# Patient Record
Sex: MALE | Race: BLACK OR AFRICAN AMERICAN | NOT HISPANIC OR LATINO | Employment: OTHER | URBAN - METROPOLITAN AREA
[De-identification: names, ages, dates, MRNs, and addresses within clinical notes are randomized per-mention and may not be internally consistent; named-entity substitution may affect disease eponyms.]

---

## 2017-01-04 ENCOUNTER — OFFICE VISIT (OUTPATIENT)
Dept: SURGERY | Facility: CLINIC | Age: 77
End: 2017-01-04
Payer: MEDICARE

## 2017-01-04 VITALS
HEART RATE: 71 BPM | HEIGHT: 75 IN | TEMPERATURE: 98 F | SYSTOLIC BLOOD PRESSURE: 116 MMHG | DIASTOLIC BLOOD PRESSURE: 62 MMHG | WEIGHT: 220 LBS | BODY MASS INDEX: 27.35 KG/M2

## 2017-01-04 DIAGNOSIS — K40.20 BILATERAL INGUINAL HERNIA WITHOUT OBSTRUCTION OR GANGRENE, RECURRENCE NOT SPECIFIED: Primary | ICD-10-CM

## 2017-01-04 DIAGNOSIS — I25.10 CORONARY ARTERY DISEASE, OCCLUSIVE: Chronic | ICD-10-CM

## 2017-01-04 DIAGNOSIS — E11.59 HYPERTENSION ASSOCIATED WITH DIABETES: Chronic | ICD-10-CM

## 2017-01-04 DIAGNOSIS — I15.2 HYPERTENSION ASSOCIATED WITH DIABETES: Chronic | ICD-10-CM

## 2017-01-04 DIAGNOSIS — E11.9 TYPE 2 DIABETES MELLITUS WITHOUT COMPLICATION, WITHOUT LONG-TERM CURRENT USE OF INSULIN: ICD-10-CM

## 2017-01-04 PROCEDURE — 1157F ADVNC CARE PLAN IN RCRD: CPT | Mod: S$GLB,,, | Performed by: SURGERY

## 2017-01-04 PROCEDURE — 1159F MED LIST DOCD IN RCRD: CPT | Mod: S$GLB,,, | Performed by: SURGERY

## 2017-01-04 PROCEDURE — 3078F DIAST BP <80 MM HG: CPT | Mod: S$GLB,,, | Performed by: SURGERY

## 2017-01-04 PROCEDURE — 99213 OFFICE O/P EST LOW 20 MIN: CPT | Mod: S$GLB,,, | Performed by: SURGERY

## 2017-01-04 PROCEDURE — 3074F SYST BP LT 130 MM HG: CPT | Mod: S$GLB,,, | Performed by: SURGERY

## 2017-01-04 PROCEDURE — 4010F ACE/ARB THERAPY RXD/TAKEN: CPT | Mod: S$GLB,,, | Performed by: SURGERY

## 2017-01-04 PROCEDURE — 1160F RVW MEDS BY RX/DR IN RCRD: CPT | Mod: S$GLB,,, | Performed by: SURGERY

## 2017-01-04 PROCEDURE — 1126F AMNT PAIN NOTED NONE PRSNT: CPT | Mod: S$GLB,,, | Performed by: SURGERY

## 2017-01-04 PROCEDURE — 3044F HG A1C LEVEL LT 7.0%: CPT | Mod: S$GLB,,, | Performed by: SURGERY

## 2017-01-04 PROCEDURE — 2022F DILAT RTA XM EVC RTNOPTHY: CPT | Mod: S$GLB,,, | Performed by: SURGERY

## 2017-01-04 PROCEDURE — 99999 PR PBB SHADOW E&M-EST. PATIENT-LVL III: CPT | Mod: PBBFAC,,, | Performed by: SURGERY

## 2017-01-04 RX ORDER — TIZANIDINE 4 MG/1
TABLET ORAL
COMMUNITY
Start: 2016-12-18 | End: 2017-02-23

## 2017-01-04 RX ORDER — OSELTAMIVIR PHOSPHATE 75 MG
CAPSULE ORAL
COMMUNITY
Start: 2016-12-18 | End: 2017-02-23

## 2017-01-04 RX ORDER — LIDOCAINE HYDROCHLORIDE 10 MG/ML
1 INJECTION, SOLUTION EPIDURAL; INFILTRATION; INTRACAUDAL; PERINEURAL ONCE
Status: CANCELLED | OUTPATIENT
Start: 2017-01-04 | End: 2017-01-04

## 2017-01-04 NOTE — PROGRESS NOTES
HPI:  The patient presents after us bilateral groins.  The patient states that he has pain of his bilateral hips, upper legs, back, and groin.  The pain is unchanged from previous visit.  US showed bilateral inguinal hernias.  Instructed that surgery most likely will not relive all pain but may help with groin pain.  No Nausea/vomiting.  No d/c.  No fevers    PHYSICAL EXAM:  Physical Exam    In NAD  Heart: RRR  Lungs: CTA B  Groin: No discreet hernia.    ASSESSMENT:  B Ingiunal Hernias     PLAN:    To OR for lap repair B inguinal hernia repairs with mesh  Consent obtained in clinic  Call with questions  Pt will call with date.    Will obtain cards clearance

## 2017-01-05 ENCOUNTER — TELEPHONE (OUTPATIENT)
Dept: SURGERY | Facility: CLINIC | Age: 77
End: 2017-01-05

## 2017-01-05 ENCOUNTER — TELEPHONE (OUTPATIENT)
Dept: CARDIOLOGY | Facility: CLINIC | Age: 77
End: 2017-01-05

## 2017-01-05 NOTE — TELEPHONE ENCOUNTER
Returned pt call in reference to setting up a surgery date with Dr. Ellis.  Pt states he would like to schedule surgery for 4/18/17.      Explained to pt that he would still need preop labs and he will need to get in touch with his cardiologist to schedule an appt with them ( pt states he has an appt sometime in March) Pt verbalizes understanding.

## 2017-01-05 NOTE — TELEPHONE ENCOUNTER
----- Message from Semaj Robertson sent at 1/5/2017 11:18 AM CST -----  Patient states that he needs to speak with nurse in ref to setting up surgery date//please call back at [phone]//thank you

## 2017-01-09 ENCOUNTER — PATIENT OUTREACH (OUTPATIENT)
Dept: ADMINISTRATIVE | Facility: HOSPITAL | Age: 77
End: 2017-01-09

## 2017-01-09 NOTE — PROGRESS NOTES
Most recent 2016 hemoglobin A1C routed to [employer] as attestation documentation for Diabetes Care-Blood Sugar Controlled measure. Measure has been met.

## 2017-01-10 ENCOUNTER — OFFICE VISIT (OUTPATIENT)
Dept: OPHTHALMOLOGY | Facility: CLINIC | Age: 77
End: 2017-01-10
Payer: MEDICARE

## 2017-01-10 DIAGNOSIS — H43.813 PVD (POSTERIOR VITREOUS DETACHMENT), BILATERAL: ICD-10-CM

## 2017-01-10 DIAGNOSIS — H52.4 BILATERAL PRESBYOPIA: ICD-10-CM

## 2017-01-10 DIAGNOSIS — E11.9 DIABETES MELLITUS TYPE 2 WITHOUT RETINOPATHY: Primary | ICD-10-CM

## 2017-01-10 PROCEDURE — 92015 DETERMINE REFRACTIVE STATE: CPT | Mod: S$GLB,,, | Performed by: OPTOMETRIST

## 2017-01-10 PROCEDURE — 99999 PR PBB SHADOW E&M-EST. PATIENT-LVL I: CPT | Mod: PBBFAC,,, | Performed by: OPTOMETRIST

## 2017-01-10 PROCEDURE — 92014 COMPRE OPH EXAM EST PT 1/>: CPT | Mod: S$GLB,,, | Performed by: OPTOMETRIST

## 2017-01-10 PROCEDURE — 99499 UNLISTED E&M SERVICE: CPT | Mod: S$GLB,,, | Performed by: OPTOMETRIST

## 2017-01-10 NOTE — PROGRESS NOTES
HPI     NIDDM exam. Decrease near visual acuity sometimes. Last eye exam   12/22/2015 TRF. Update glasses RX.       Last edited by Olimpia Ponce on 1/10/2017  8:40 AM.         Assessment /Plan     For exam results, see Encounter Report.    Diabetes mellitus type 2 without retinopathy    PVD (posterior vitreous detachment), bilateral    Nuclear cataract, bilateral    Bilateral presbyopia      No Background Diabetic Retinopathy    Stable PVD OU    Moderate cataracts, not surgical, will monitor annually    Dispense Final Rx for glasses.  RTC 1 year

## 2017-01-20 DIAGNOSIS — M54.50 LUMBAR SPINE PAIN: ICD-10-CM

## 2017-01-20 RX ORDER — CELECOXIB 200 MG/1
200 CAPSULE ORAL DAILY
Qty: 90 CAPSULE | Refills: 3 | Status: ON HOLD | OUTPATIENT
Start: 2017-01-20 | End: 2017-10-09

## 2017-02-21 ENCOUNTER — TELEPHONE (OUTPATIENT)
Dept: PULMONOLOGY | Facility: CLINIC | Age: 77
End: 2017-02-21

## 2017-02-21 DIAGNOSIS — G47.33 OSA (OBSTRUCTIVE SLEEP APNEA): Primary | ICD-10-CM

## 2017-02-23 ENCOUNTER — HOSPITAL ENCOUNTER (OUTPATIENT)
Dept: RADIOLOGY | Facility: HOSPITAL | Age: 77
Discharge: HOME | End: 2017-02-23
Attending: INTERNAL MEDICINE
Payer: MEDICARE

## 2017-02-23 ENCOUNTER — OFFICE VISIT (OUTPATIENT)
Dept: SLEEP MEDICINE | Facility: CLINIC | Age: 77
End: 2017-02-23
Payer: MEDICARE

## 2017-02-23 VITALS
DIASTOLIC BLOOD PRESSURE: 68 MMHG | SYSTOLIC BLOOD PRESSURE: 118 MMHG | RESPIRATION RATE: 18 BRPM | BODY MASS INDEX: 27.82 KG/M2 | HEART RATE: 69 BPM | WEIGHT: 223.75 LBS | OXYGEN SATURATION: 97 % | HEIGHT: 75 IN

## 2017-02-23 DIAGNOSIS — G47.52 REM BEHAVIORAL DISORDER: Primary | Chronic | ICD-10-CM

## 2017-02-23 DIAGNOSIS — G47.50 PARASOMNIA: ICD-10-CM

## 2017-02-23 DIAGNOSIS — G47.33 OSA (OBSTRUCTIVE SLEEP APNEA): ICD-10-CM

## 2017-02-23 PROCEDURE — 99999 PR PBB SHADOW E&M-EST. PATIENT-LVL III: CPT | Mod: PBBFAC,,, | Performed by: INTERNAL MEDICINE

## 2017-02-23 PROCEDURE — 1125F AMNT PAIN NOTED PAIN PRSNT: CPT | Mod: S$GLB,,, | Performed by: INTERNAL MEDICINE

## 2017-02-23 PROCEDURE — 1157F ADVNC CARE PLAN IN RCRD: CPT | Mod: S$GLB,,, | Performed by: INTERNAL MEDICINE

## 2017-02-23 PROCEDURE — 99499 UNLISTED E&M SERVICE: CPT | Mod: S$GLB,,, | Performed by: INTERNAL MEDICINE

## 2017-02-23 PROCEDURE — 1160F RVW MEDS BY RX/DR IN RCRD: CPT | Mod: S$GLB,,, | Performed by: INTERNAL MEDICINE

## 2017-02-23 PROCEDURE — 71020 XR CHEST PA AND LATERAL: CPT | Mod: 26,,, | Performed by: RADIOLOGY

## 2017-02-23 PROCEDURE — 1159F MED LIST DOCD IN RCRD: CPT | Mod: S$GLB,,, | Performed by: INTERNAL MEDICINE

## 2017-02-23 PROCEDURE — 3078F DIAST BP <80 MM HG: CPT | Mod: S$GLB,,, | Performed by: INTERNAL MEDICINE

## 2017-02-23 PROCEDURE — 99214 OFFICE O/P EST MOD 30 MIN: CPT | Mod: S$GLB,,, | Performed by: INTERNAL MEDICINE

## 2017-02-23 PROCEDURE — 3074F SYST BP LT 130 MM HG: CPT | Mod: S$GLB,,, | Performed by: INTERNAL MEDICINE

## 2017-02-23 NOTE — MR AVS SNAPSHOT
[facility]  [address] [place] [address]  Phone: [phone]                  Srinath Mcknight   2017 9:20 AM   Office Visit    Description:  Male : 1940   Provider:  Martin Machuca MD   Department:  [facility]           Reason for Visit     insomnia           Diagnoses this Visit        Comments    REM behavioral disorder    -  Primary Increased Clonazepam to 0.5 mg  Home safety  No Napping  Neuology eval    Parasomnia                To Do List           Future Appointments        Provider Department Dept Phone    3/7/2017 7:50 AM LABORATORY, [place] [facility] [phone]    3/7/2017 7:50 AM SPECIMEN, [place] [facility] [phone]    3/29/2017 1:20 PM Elver Cobos MD [place] - Cardiology [phone]    2017 7:00 AM Mary Ann Ross, DPM [facility] - Podiatry [phone]    2018 9:30 AM Amol New OD [facility] [phone]      Your Future Surgeries/Procedures     2017   Surgery with Chauncey Ellis Jr., MD   [facility]    [address] [place] [id number] [phone]              Goals (5 Years of Data)     None      Follow-Up and Disposition     Return in about 3 months (around 2017) for Sleep hygeine, PSG, sleep diary, .      [facility]     [facility] Care [facility] -  Assistance  Registered nurses in the [facility] provide clinical advisement, health education, appointment booking, and other advisory services.  Call for this free service at [phone].             Medications           Message regarding Medications     Verify the changes and/or additions to your medication regime listed below are the same as discussed with your clinician today.  If any of these changes or additions are incorrect, please notify your healthcare provider.        STOP taking these  "medications     TAMIFLU 75 mg capsule     VIRTUSSIN AC  mg/5 mL syrup            Verify that the below list of medications is an accurate representation of the medications you are currently taking.  If none reported, the list may be blank. If incorrect, please contact your healthcare provider. Carry this list with you in case of emergency.           Current Medications     aspirin (ECOTRIN) 81 MG EC tablet Take 81 mg by mouth once daily.    blood sugar diagnostic (ACCU-CHEK FRANKO PLUS TEST STRP) Strp USE  1  STRIP ONE TIME DAILY    celecoxib (CELEBREX) 200 MG capsule Take 1 capsule (200 mg total) by mouth once daily.    clonazePAM (KLONOPIN) 0.25 MG TbDL Take 1 tablet (0.25 mg total) by mouth nightly.    docusate sodium (COLACE) 100 MG capsule Take 1 capsule (100 mg total) by mouth 2 (two) times daily as needed.    glipiZIDE (GLUCOTROL) 5 MG tablet TAKE 2 TABLETS (10 MG) TWO TIMES DAILY BEFORE MEALS    lancets (ACCU-CHEK MULTICLIX LANCET) Misc TEST ONE TIME DAILY    losartan-hydrochlorothiazide 50-12.5 mg (HYZAAR) 50-12.5 mg per tablet TAKE 1 TABLET ONE TIME DAILY    melatonin 5 mg Tab Take 5 mg by mouth every evening.    metformin (GLUCOPHAGE) 1000 MG tablet TAKE 1 TABLET TWICE DAILY WITH MEALS    metoprolol succinate (TOPROL-XL) 50 MG 24 hr tablet TAKE 1 TABLET EVERY DAY    oxycodone-acetaminophen (PERCOCET)  mg per tablet Take 1 tablet by mouth every 4 (four) hours as needed.    pravastatin (PRAVACHOL) 40 MG tablet TAKE 1 TABLET EVERY DAY    sildenafil (VIAGRA) 100 MG tablet Take 1 tablet (100 mg total) by mouth as needed. Take on an empty stomach           Clinical Reference Information           Your Vitals Were     BP Pulse Resp Height Weight SpO2    118/68 69 18 6' 3" (1.905 m) 101.5 kg (223 lb 12.3 oz) 97%    BMI                27.97 kg/m2          Blood Pressure          Most Recent Value    BP  118/68      Allergies as of 2/23/2017     Sulfa (Sulfonamide Antibiotics)      Immunizations " Administered on Date of Encounter - 2/23/2017     None      Orders Placed During Today's Visit     Future Labs/Procedures Expected by Expires    Polysomnogram (CPAP will be added if patient meets diagnostic criteria.)  As directed 2/23/2018      [facility] Sign-Up     Activating your [facility] account is as easy as 1-2-3!     1) Visit [email or web address], select Sign Up Now, enter this activation code and your date of birth, then select Next.  Activation code not generated  Current Patient Portal Status: Account disabled      2) Create a username and password to use when you visit [facility] in the future and select a security question in case you lose your password and select Next.    3) Enter your e-mail address and click Sign Up!    Additional Information  If you have questions, please e-mail [email or web address] or call [phone] to talk to our [facility] staff. Remember, [facility] is NOT to be used for urgent needs. For medical emergencies, dial 911.         Instructions      Safe sleeping environment -- All patients with RBD and their bed partners should be counseled on ways to alter the sleeping environment to prevent injury. For patients with mild symptoms, this may be all that is needed.  Safety for both patients and bed partners is the paramount concern. Firearms should not be accessible, and sharp or easily breakable items (such as lamps) should be removed from the immediate sleeping area. In the event of continued vigorous behaviors, sleeping alone is advised. Many patients resort to using padded bed rails or sleeping in a sleeping bag [79].  Other novel strategies are in development. Exiting the bed while acting out a dream is a high-risk behavior that may result in traumatic injury [83]. A bed alarm that delivers a customized calming message at the onset of dream enactment can prevent a patient from exiting the bed and avert sleep-related injury [84].               Language Assistance Services      ATTENTION: Language assistance services are available, free of charge. Please call [phone].      ATENCIÓN: Si habla alphonse, tiene a bahena disposición servicios gratuitos de asistencia lingüística. Llame al [phone].     CHÚ Ý: N?u b?n nói Ti?ng Vi?t, có các d?ch v? h? tr? ngôn ng? mi?n phí dành cho b?n. G?i s? [phone].         [facility] complies with applicable Federal civil rights laws and does not discriminate on the basis of race, color, national origin, age, disability, or sex.

## 2017-02-23 NOTE — PATIENT INSTRUCTIONS
Safe sleeping environment -- All patients with RBD and their bed partners should be counseled on ways to alter the sleeping environment to prevent injury. For patients with mild symptoms, this may be all that is needed.  Safety for both patients and bed partners is the paramount concern. Firearms should not be accessible, and sharp or easily breakable items (such as lamps) should be removed from the immediate sleeping area. In the event of continued vigorous behaviors, sleeping alone is advised. Many patients resort to using padded bed rails or sleeping in a sleeping bag [79].  Other novel strategies are in development. Exiting the bed while acting out a dream is a high-risk behavior that may result in traumatic injury [83]. A bed alarm that delivers a customized calming message at the onset of dream enactment can prevent a patient from exiting the bed and avert sleep-related injury [84].

## 2017-02-23 NOTE — PROGRESS NOTES
Subjective:       Srinath Mcknight is a 76 y.o. male   Follow-up appointment.  Patient has a diagnosis of REM behavior disorder previously seen by Dr. Ram.  Medications clonazepam 0.25 mg and melatonin 5 mg.  Bedtime is between 10:30 to 11pm.    Patient may have some trouble falling asleep   wakeup time 8 AM in the morning frequent wake after sleep onset   during the daytime he may feel sleepy at night while watching TV  He is frequently still has vivid dreams.  He walks with a cane  Retired schoolteacher  He has had some snoring recently  [place] sleepiness score is 7.  I have discussed and reviewed his last sleep study 2014 by Dr. Ram that indicated that he had REM behavior disorder.  Still complains of poor unknown refreshing and fragmented sleep  I'm concerned that he may have sleep-disordered breathing.  He lives alone  He tells me that his sleep difficulties started after his mother passed away and then subsequently his father and his wife most recently  No smoking no alcohol  I've discussed in detail with patient about good sleep hygiene  I want him to avoid napping during the daytime  He will need to see neurology to follow up for his REM behavioral disorder case she has some early dementia  I like to increase his clonazepam 0.5 mg  He has been having difficulty affording this medication will [facility] to [facility]  Immunisations are  current      Previous Report(s) Reviewed: historical medical records, lab reports and office notes     The following portions of the patient's history were reviewed and updated as appropriate:   He  has a past medical history of Anemia due to unknown mechanism (11/10/2015); Angina pectoris (2014); Arthritis; Back pain; Coronary artery disease; Diabetes mellitus (20 years); Diabetes mellitus type II (1994); DM (diabetes mellitus) (1994); Hyperlipemia; Hypertension; Spinal cord disease; and Trouble in sleeping.  He  does not have any pertinent problems on file.  He  has a  past surgical history that includes Rotator cuff repair (Left, 2006); Shoulder arthroscopy w/ rotator cuff repair (Right, 2009); and Laminectomy (07/22/2016).  His family history includes Cancer (age of onset: 50) in his brother; Diabetes in his father, mother, and sister; Drug abuse in his brother; Heart disease in his father and mother; Hypertension in his father and mother; Stroke in his father. There is no history of Prostate cancer, Macular degeneration, Retinal detachment, Strabismus, Glaucoma, Blindness, Amblyopia, Kidney disease, Mental illness, Mental retardation, COPD, or Asthma.  He  reports that he has never smoked. He has never used smokeless tobacco. He reports that he drinks about 0.6 oz of alcohol per week  He reports that he does not use illicit drugs.  He has a current medication list which includes the following prescription(s): aspirin, blood sugar diagnostic, celecoxib, clonazepam, docusate sodium, glipizide, lancets, losartan-hydrochlorothiazide 50-12.5 mg, melatonin, metformin, metoprolol succinate, oxycodone-acetaminophen, pravastatin, and sildenafil.  Current Outpatient Prescriptions on File Prior to Visit   Medication Sig Dispense Refill    aspirin (ECOTRIN) 81 MG EC tablet Take 81 mg by mouth once daily.      blood sugar diagnostic (ACCU-CHEK FRANKO PLUS TEST STRP) Strp USE  1  STRIP ONE TIME DAILY 100 strip 4    celecoxib (CELEBREX) 200 MG capsule Take 1 capsule (200 mg total) by mouth once daily. 90 capsule 3    clonazePAM (KLONOPIN) 0.25 MG TbDL Take 1 tablet (0.25 mg total) by mouth nightly. (Patient taking differently: Take 0.5 mg by mouth nightly. ) 90 tablet 1    docusate sodium (COLACE) 100 MG capsule Take 1 capsule (100 mg total) by mouth 2 (two) times daily as needed. 60 capsule 0    glipiZIDE (GLUCOTROL) 5 MG tablet TAKE 2 TABLETS (10 MG) TWO TIMES DAILY BEFORE MEALS 360 tablet 4    lancets (ACCU-CHEK MULTICLIX LANCET) Misc TEST ONE TIME DAILY 100 each 3     "losartan-hydrochlorothiazide 50-12.5 mg (HYZAAR) 50-12.5 mg per tablet TAKE 1 TABLET ONE TIME DAILY 90 tablet 4    melatonin 5 mg Tab Take 5 mg by mouth every evening.      metformin (GLUCOPHAGE) 1000 MG tablet TAKE 1 TABLET TWICE DAILY WITH MEALS 180 tablet 4    metoprolol succinate (TOPROL-XL) 50 MG 24 hr tablet TAKE 1 TABLET EVERY DAY 90 tablet 3    oxycodone-acetaminophen (PERCOCET)  mg per tablet Take 1 tablet by mouth every 4 (four) hours as needed. 90 tablet 0    pravastatin (PRAVACHOL) 40 MG tablet TAKE 1 TABLET EVERY DAY 90 tablet 4    sildenafil (VIAGRA) 100 MG tablet Take 1 tablet (100 mg total) by mouth as needed. Take on an empty stomach 6 tablet 11    [DISCONTINUED] TAMIFLU 75 mg capsule       [DISCONTINUED] VIRTUSSIN AC  mg/5 mL syrup        No current facility-administered medications on file prior to visit.      He is allergic to sulfa (sulfonamide antibiotics)..    Review of Systems  A comprehensive review of systems was negative except for: Neurological: positive for snoring. vivid dreams      Objective:        Visit Vitals    /68    Pulse 69    Resp 18    Ht 6' 3" (1.905 m)    Wt 101.5 kg (223 lb 12.3 oz)    SpO2 97%    BMI 27.97 kg/m2     General appearance: alert, appears stated age, cooperative and no distress  Head: atraumatic  Throat: normal findings: lips normal without lesions  Lungs: clear to auscultation bilaterally and normal percussion bilaterally  Heart: regular rate and rhythm, S1, S2 normal, no murmur, click, rub or gallop  Pulses: 2+ and symmetric  Skin: Skin color, texture, turgor normal. No rashes or lesions  Lymph nodes: Cervical, supraclavicular, and axillary nodes normal.  Neurologic: Grossly normal        CXR was reviewed  The lungs are clear and free of infiltrate.  No pleural effusion or pneumothorax is identified.  The heart is not enlarged.    Reviewed Original PSG  Assessment:      Problem List Items Addressed This Visit     REM " behavioral disorder - Primary (Chronic)       Safe sleeping environment -- All patients with RBD and their bed partners should be counseled on ways to alter the sleeping environment to prevent injury. For patients with mild symptoms, this may be all that is needed.  Safety for both patients and bed partners is the paramount concern. Firearms should not be accessible, and sharp or easily breakable items (such as lamps) should be removed from the immediate sleeping area. In the event of continued vigorous behaviors, sleeping alone is advised. Many patients resort to using padded bed rails or sleeping in a sleeping bag [79].  Other novel strategies are in development. Exiting the bed while acting out a dream is a high-risk behavior that may result in traumatic injury [83]. A bed alarm that delivers a customized calming message at the onset of dream enactment can prevent a patient from exiting the bed and avert sleep-related injury [84].           Relevant Orders    Polysomnogram (CPAP will be added if patient meets diagnostic criteria.)      Other Visit Diagnoses     Parasomnia        Relevant Orders    Polysomnogram (CPAP will be added if patient meets diagnostic criteria.)         Plan:      Return in about 3 months (around 5/23/2017) for Sleep hygeine, PSG, sleep diary, .    This note was prepared using voice recognition system and is likely to have sound alike errors that may have been overlooked even after proof reading.  Please call me with any questions    Discussed diagnosis, its evaluation, treatment and usual course. All questions answered.    Thank you for the courtesy of participating in the care of this patient    Martin Machuca MD

## 2017-03-01 ENCOUNTER — HOSPITAL ENCOUNTER (OUTPATIENT)
Dept: SLEEP MEDICINE | Facility: HOSPITAL | Age: 77
Discharge: HOME | End: 2017-03-01
Attending: INTERNAL MEDICINE
Payer: MEDICARE

## 2017-03-01 DIAGNOSIS — G47.33 OSA (OBSTRUCTIVE SLEEP APNEA): ICD-10-CM

## 2017-03-01 DIAGNOSIS — G47.52 REM BEHAVIORAL DISORDER: Chronic | ICD-10-CM

## 2017-03-01 DIAGNOSIS — G47.50 PARASOMNIA: ICD-10-CM

## 2017-03-01 PROCEDURE — 95810 POLYSOM 6/> YRS 4/> PARAM: CPT | Mod: 26,,, | Performed by: INTERNAL MEDICINE

## 2017-03-01 PROCEDURE — 95810 POLYSOM 6/> YRS 4/> PARAM: CPT

## 2017-03-01 NOTE — Clinical Note
"SUMMARY STATEMENTS: 1. Findings related to sleep diagnoses: " Moderate intermittent snoring " The overall AHI was 6.1 (38 events) " 95.4% of sleep was recorded in supine position 2. EEG abnormalities: " Absent slow-wave sleep.  Sleep latency was normal.  REM latency was delayed. " Increased chin EMG tone consistent with REM  without Eldy " Severe periodic limb movements with significant arousal " Arousal index was very high: 58.0 events per hour. 3. ECG abnormalities: " Frequent PVCs and sinus bradycardia " Average heart rate was 62 bpm with a maximal heart rate 85 bpm 4. Behavioral observations: a. Recording Tech Comments Sleepwalking and sleep talking observed 02:01 AM.   REM without atonia and sleep talking observed during REM sleep at 03:47 AM  INTERPRETATION:  1. Borderline/mild obstructive sleep apnea. 2. 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 Severe periodic limb movement disorder with arousals  RECOMMENDATIONS  1. Optimize therapy increase clonazepam "

## 2017-03-07 ENCOUNTER — LAB VISIT (OUTPATIENT)
Dept: LAB | Facility: HOSPITAL | Age: 77
End: 2017-03-07
Attending: INTERNAL MEDICINE
Payer: MEDICARE

## 2017-03-07 DIAGNOSIS — E11.9 TYPE 2 DIABETES MELLITUS WITHOUT COMPLICATION, WITHOUT LONG-TERM CURRENT USE OF INSULIN: ICD-10-CM

## 2017-03-07 DIAGNOSIS — Z12.5 SPECIAL SCREENING, PROSTATE CANCER: ICD-10-CM

## 2017-03-07 LAB
BASOPHILS # BLD AUTO: 0.03 K/UL
BASOPHILS NFR BLD: 0.6 %
DIFFERENTIAL METHOD: ABNORMAL
EOSINOPHIL # BLD AUTO: 0.2 K/UL
EOSINOPHIL NFR BLD: 4.9 %
ERYTHROCYTE [DISTWIDTH] IN BLOOD BY AUTOMATED COUNT: 16.3 %
HCT VFR BLD AUTO: 38.8 %
HGB BLD-MCNC: 12.5 G/DL
LYMPHOCYTES # BLD AUTO: 2.2 K/UL
LYMPHOCYTES NFR BLD: 47.9 %
MCH RBC QN AUTO: 27.8 PG
MCHC RBC AUTO-ENTMCNC: 32.2 %
MCV RBC AUTO: 86 FL
MONOCYTES # BLD AUTO: 0.5 K/UL
MONOCYTES NFR BLD: 10.5 %
NEUTROPHILS # BLD AUTO: 1.7 K/UL
NEUTROPHILS NFR BLD: 35.7 %
PLATELET # BLD AUTO: 219 K/UL
PMV BLD AUTO: 10.7 FL
PROSTATE SPECIFIC ANTIGEN, TOTAL: 0.53 NG/ML
PSA FREE MFR SERPL: 52.83 %
PSA FREE SERPL-MCNC: 0.28 NG/ML
RBC # BLD AUTO: 4.5 M/UL
WBC # BLD AUTO: 4.66 K/UL

## 2017-03-07 PROCEDURE — 80053 COMPREHEN METABOLIC PANEL: CPT

## 2017-03-07 PROCEDURE — 36415 COLL VENOUS BLD VENIPUNCTURE: CPT | Mod: PO

## 2017-03-07 PROCEDURE — 84153 ASSAY OF PSA TOTAL: CPT

## 2017-03-07 PROCEDURE — 83036 HEMOGLOBIN GLYCOSYLATED A1C: CPT

## 2017-03-07 PROCEDURE — 85025 COMPLETE CBC W/AUTO DIFF WBC: CPT

## 2017-03-07 PROCEDURE — 80061 LIPID PANEL: CPT

## 2017-03-08 LAB
ALBUMIN SERPL BCP-MCNC: 3.9 G/DL
ALP SERPL-CCNC: 51 U/L
ALT SERPL W/O P-5'-P-CCNC: 16 U/L
ANION GAP SERPL CALC-SCNC: 10 MMOL/L
AST SERPL-CCNC: 24 U/L
BILIRUB SERPL-MCNC: 0.4 MG/DL
BUN SERPL-MCNC: 17 MG/DL
CALCIUM SERPL-MCNC: 9.2 MG/DL
CHLORIDE SERPL-SCNC: 108 MMOL/L
CHOLEST/HDLC SERPL: 5.1 {RATIO}
CO2 SERPL-SCNC: 26 MMOL/L
CREAT SERPL-MCNC: 1.3 MG/DL
EST. GFR  (AFRICAN AMERICAN): >60 ML/MIN/1.73 M^2
EST. GFR  (NON AFRICAN AMERICAN): 53.8 ML/MIN/1.73 M^2
ESTIMATED AVG GLUCOSE: 126 MG/DL
GLUCOSE SERPL-MCNC: 82 MG/DL
HBA1C MFR BLD HPLC: 6 %
HDL/CHOLESTEROL RATIO: 19.7 %
HDLC SERPL-MCNC: 142 MG/DL
HDLC SERPL-MCNC: 28 MG/DL
LDLC SERPL CALC-MCNC: 87 MG/DL
NONHDLC SERPL-MCNC: 114 MG/DL
POTASSIUM SERPL-SCNC: 4 MMOL/L
PROT SERPL-MCNC: 7.2 G/DL
SODIUM SERPL-SCNC: 144 MMOL/L
TRIGL SERPL-MCNC: 135 MG/DL

## 2017-03-10 NOTE — PROCEDURES
"SUMMARY STATEMENTS:  1. Findings related to sleep diagnoses:   Moderate intermittent snoring   The overall AHI was 6.1 (38 events)   95.4% of sleep was recorded in supine position  2. EEG abnormalities:   Absent slow-wave sleep.  Sleep latency was normal.  REM latency was delayed.   Increased chin EMG tone consistent with REM  without Ledy   Severe periodic limb movements with significant arousal   Arousal index was very high: 58.0 events per hour.  3. ECG abnormalities:   Frequent PVCs and sinus bradycardia   Average heart rate was 62 bpm with a maximal heart rate 85 bpm  4. Behavioral observations:  a. Recording Tech Comments  Sleepwalking and sleep talking observed 02:01 AM.    REM without atonia and sleep talking observed during REM sleep at 03:47 AM    INTERPRETATION:   1. Borderline/mild obstructive sleep apnea.  2. 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 Severe periodic limb movement disorder with arousals    RECOMMENDATIONS    1. Optimize therapy increase clonazepam dosages  2. Safety interventions for REM behavior disorder  3. Consider treating severe periodic limb movements with pramipexole    See imported Sleep Study result in "Chart Review" under the   "Media tab".      (This Sleep Study was interpreted by a Board Certified Sleep   Specialist who conducted an epoch-by-epoch review of the entire   raw data recording.)     (The indication for this sleep study was reviewed and deemed   appropriate by [facility] Practice Parameters or other reasons by a   Board Certified Sleep Specialist.)    Martin Machuca MD      "

## 2017-03-14 ENCOUNTER — TELEPHONE (OUTPATIENT)
Dept: PULMONOLOGY | Facility: CLINIC | Age: 77
End: 2017-03-14

## 2017-03-14 NOTE — TELEPHONE ENCOUNTER
----- Message from Martin Machuca MD sent at 3/9/2017  6:39 PM CST -----  Need to see hospital week adjust meds

## 2017-03-20 ENCOUNTER — OFFICE VISIT (OUTPATIENT)
Dept: PULMONOLOGY | Facility: CLINIC | Age: 77
End: 2017-03-20
Payer: MEDICARE

## 2017-03-20 ENCOUNTER — OFFICE VISIT (OUTPATIENT)
Dept: INTERNAL MEDICINE | Facility: CLINIC | Age: 77
End: 2017-03-20
Payer: MEDICARE

## 2017-03-20 VITALS
RESPIRATION RATE: 18 BRPM | WEIGHT: 224 LBS | HEIGHT: 75 IN | SYSTOLIC BLOOD PRESSURE: 120 MMHG | DIASTOLIC BLOOD PRESSURE: 64 MMHG | BODY MASS INDEX: 27.85 KG/M2 | OXYGEN SATURATION: 98 % | HEART RATE: 76 BPM

## 2017-03-20 VITALS
WEIGHT: 222 LBS | SYSTOLIC BLOOD PRESSURE: 134 MMHG | HEART RATE: 78 BPM | DIASTOLIC BLOOD PRESSURE: 70 MMHG | BODY MASS INDEX: 28.49 KG/M2 | TEMPERATURE: 97 F | HEIGHT: 74 IN

## 2017-03-20 DIAGNOSIS — I15.2 HYPERTENSION ASSOCIATED WITH DIABETES: Chronic | ICD-10-CM

## 2017-03-20 DIAGNOSIS — I70.0 AORTIC CALCIFICATION: Chronic | ICD-10-CM

## 2017-03-20 DIAGNOSIS — I25.10 CORONARY ARTERY DISEASE, OCCLUSIVE: Chronic | ICD-10-CM

## 2017-03-20 DIAGNOSIS — E11.59 HYPERTENSION ASSOCIATED WITH DIABETES: Chronic | ICD-10-CM

## 2017-03-20 DIAGNOSIS — M47.26 OSTEOARTHRITIS OF SPINE WITH RADICULOPATHY, LUMBAR REGION: Chronic | ICD-10-CM

## 2017-03-20 DIAGNOSIS — G47.50 PARASOMNIA: ICD-10-CM

## 2017-03-20 DIAGNOSIS — G47.61 PLMD (PERIODIC LIMB MOVEMENT DISORDER): Primary | ICD-10-CM

## 2017-03-20 DIAGNOSIS — E78.2 COMBINED HYPERLIPIDEMIA ASSOCIATED WITH TYPE 2 DIABETES MELLITUS: Chronic | ICD-10-CM

## 2017-03-20 DIAGNOSIS — E11.69 COMBINED HYPERLIPIDEMIA ASSOCIATED WITH TYPE 2 DIABETES MELLITUS: Chronic | ICD-10-CM

## 2017-03-20 DIAGNOSIS — E11.9 TYPE 2 DIABETES MELLITUS WITHOUT COMPLICATION, WITHOUT LONG-TERM CURRENT USE OF INSULIN: ICD-10-CM

## 2017-03-20 DIAGNOSIS — G47.33 OSA (OBSTRUCTIVE SLEEP APNEA): ICD-10-CM

## 2017-03-20 DIAGNOSIS — G47.52 REM BEHAVIORAL DISORDER: Chronic | ICD-10-CM

## 2017-03-20 PROCEDURE — 99999 PR PBB SHADOW E&M-EST. PATIENT-LVL III: CPT | Mod: PBBFAC,,, | Performed by: INTERNAL MEDICINE

## 2017-03-20 PROCEDURE — 3044F HG A1C LEVEL LT 7.0%: CPT | Mod: S$GLB,,, | Performed by: INTERNAL MEDICINE

## 2017-03-20 PROCEDURE — 99499 UNLISTED E&M SERVICE: CPT | Mod: S$GLB,,, | Performed by: INTERNAL MEDICINE

## 2017-03-20 PROCEDURE — 3078F DIAST BP <80 MM HG: CPT | Mod: S$GLB,,, | Performed by: INTERNAL MEDICINE

## 2017-03-20 PROCEDURE — 99214 OFFICE O/P EST MOD 30 MIN: CPT | Mod: S$GLB,,, | Performed by: INTERNAL MEDICINE

## 2017-03-20 PROCEDURE — 4010F ACE/ARB THERAPY RXD/TAKEN: CPT | Mod: S$GLB,,, | Performed by: INTERNAL MEDICINE

## 2017-03-20 PROCEDURE — 1125F AMNT PAIN NOTED PAIN PRSNT: CPT | Mod: S$GLB,,, | Performed by: INTERNAL MEDICINE

## 2017-03-20 PROCEDURE — 1159F MED LIST DOCD IN RCRD: CPT | Mod: S$GLB,,, | Performed by: INTERNAL MEDICINE

## 2017-03-20 PROCEDURE — 1157F ADVNC CARE PLAN IN RCRD: CPT | Mod: S$GLB,,, | Performed by: INTERNAL MEDICINE

## 2017-03-20 PROCEDURE — 1160F RVW MEDS BY RX/DR IN RCRD: CPT | Mod: S$GLB,,, | Performed by: INTERNAL MEDICINE

## 2017-03-20 PROCEDURE — 3075F SYST BP GE 130 - 139MM HG: CPT | Mod: S$GLB,,, | Performed by: INTERNAL MEDICINE

## 2017-03-20 PROCEDURE — 3074F SYST BP LT 130 MM HG: CPT | Mod: S$GLB,,, | Performed by: INTERNAL MEDICINE

## 2017-03-20 RX ORDER — PRAMIPEXOLE DIHYDROCHLORIDE 0.25 MG/1
0.25 TABLET ORAL NIGHTLY
Qty: 30 TABLET | Refills: 5 | Status: SHIPPED | OUTPATIENT
Start: 2017-03-20 | End: 2017-10-04 | Stop reason: SDUPTHER

## 2017-03-20 NOTE — ASSESSMENT & PLAN NOTE
Diabetes continues to do well at this time.  Most recent a1c is 6.0.  We will continue the current regimen.  Focus on a low carbohydrate diet and consistent exercise.

## 2017-03-20 NOTE — ASSESSMENT & PLAN NOTE
PLMI was 69/hr and PLMAi was 17.7/hr  Had 430 leg movements with 110 causing arousal  Reviewed literature  It is efficacious to treat: Mirapex

## 2017-03-20 NOTE — MR AVS SNAPSHOT
[facility]  [address] [place] [address]  Phone: [phone]  Fax: [phone]                  Srinath Mcknight   3/20/2017 2:40 PM   Office Visit    Description:  Male : 1940   Provider:  Yeison Wilder MD   Department:  [facility]           Reason for Visit     Follow-up           Diagnoses this Visit        Comments    Type 2 diabetes mellitus without complication, without long-term current use of insulin         Hypertension associated with diabetes         Combined hyperlipidemia associated with type 2 diabetes mellitus         Coronary artery disease, occlusive         Aortic calcification         CHARLES (obstructive sleep apnea)         Osteoarthritis of spine with radiculopathy, lumbar region                To Do List           Future Appointments        Provider Department Dept Phone    3/24/2017 11:30 AM Pan Marte MD [facility] [phone]    3/29/2017 1:20 PM Elver Cobos MD [facility] [phone]    2017 10:20 AM Martin Machuca MD [facility] [phone]    2018 9:30 AM Amol New OD [facility] - Ophthalmology [phone]      Your Future Surgeries/Procedures     2017   Surgery with Chauncey Ellis Jr., MD   [facility] ([facility])    [address] [place] [address] [phone]              Goals (5 Years of Data)     None      Follow-Up and Disposition     Return in about 4 months (around 2017).      [facility]     [facility] Care [facility] -  Assistance  Registered nurses in the [facility] provide clinical advisement, health education, appointment booking, and other advisory services.  Call for this free service at [phone].             Medications           Message regarding Medications     Verify the changes and/or additions to your medication regime listed below are  "the same as discussed with your clinician today.  If any of these changes or additions are incorrect, please notify your healthcare provider.             Verify that the below list of medications is an accurate representation of the medications you are currently taking.  If none reported, the list may be blank. If incorrect, please contact your healthcare provider. Carry this list with you in case of emergency.           Current Medications     aspirin (ECOTRIN) 81 MG EC tablet Take 81 mg by mouth once daily.    blood sugar diagnostic (ACCU-CHEK FRANKO PLUS TEST STRP) Strp USE  1  STRIP ONE TIME DAILY    celecoxib (CELEBREX) 200 MG capsule Take 1 capsule (200 mg total) by mouth once daily.    clonazePAM (KLONOPIN) 0.25 MG TbDL Take 1 tablet (0.25 mg total) by mouth nightly.    docusate sodium (COLACE) 100 MG capsule Take 1 capsule (100 mg total) by mouth 2 (two) times daily as needed.    glipiZIDE (GLUCOTROL) 5 MG tablet TAKE 2 TABLETS (10 MG) TWO TIMES DAILY BEFORE MEALS    lancets (ACCU-CHEK MULTICLIX LANCET) Misc TEST ONE TIME DAILY    losartan-hydrochlorothiazide 50-12.5 mg (HYZAAR) 50-12.5 mg per tablet TAKE 1 TABLET ONE TIME DAILY    melatonin 5 mg Tab Take 5 mg by mouth every evening.    metformin (GLUCOPHAGE) 1000 MG tablet TAKE 1 TABLET TWICE DAILY WITH MEALS    metoprolol succinate (TOPROL-XL) 50 MG 24 hr tablet TAKE 1 TABLET EVERY DAY    pravastatin (PRAVACHOL) 40 MG tablet TAKE 1 TABLET EVERY DAY    sildenafil (VIAGRA) 100 MG tablet Take 1 tablet (100 mg total) by mouth as needed. Take on an empty stomach    oxycodone-acetaminophen (PERCOCET)  mg per tablet Take 1 tablet by mouth every 4 (four) hours as needed.           Clinical Reference Information           Your Vitals Were     BP Pulse Temp Height Weight BMI    134/70 78 97.1 °F (36.2 °C) (Tympanic) 6' 2" (1.88 m) 100.7 kg (222 lb) 28.5 kg/m2      Blood Pressure          Most Recent Value    BP  134/70      Allergies as of 3/20/2017     Sulfa " (Sulfonamide Antibiotics)      Immunizations Administered on Date of Encounter - 3/20/2017     None      Orders Placed During Today's Visit      Normal Orders This Visit    Ambulatory referral to Pain Clinic       [facility]     Activating your [facility] account is as easy as 1-2-3!     1) Visit [email or web address], select Sign Up Now, enter this activation code and your date of birth, then select Next.  Activation code not generated  Current Patient Portal Status: Account disabled      2) Create a username and password to use when you visit [facility] in the future and select a security question in case you lose your password and select Next.    3) Enter your e-mail address and click Sign Up!    Additional Information  If you have questions, please e-mail [email or web address] or call [phone] to talk to our [facility] staff. Remember, [facility] is NOT to be used for urgent needs. For medical emergencies, dial 911.         Language Assistance Services     ATTENTION: Language assistance services are available, free of charge. Please call [phone].      ATENCIÓN: Si sheba cunha, tiene a bahena disposición servicios gratuitos de asistencia lingüística. Llame al [phone].     AMARILIS Ý: N?u b?n nói Ti?ng Vi?t, có các d?ch v? h? tr? ngôn ng? mi?n phí dành cho b?n. G?i s? [phone].         [facility] complies with applicable Federal civil rights laws and does not discriminate on the basis of race, color, national origin, age, disability, or sex.

## 2017-03-20 NOTE — MR AVS SNAPSHOT
[facility]  [address] [facility] [address]  Phone: [phone]  Fax: [phone]                  Srinath Mcknight   3/20/2017 3:20 PM   Office Visit    Description:  Male : 1940   Provider:  Martin Machuca MD   Department:  [facility]           Reason for Visit     PLMD     REM behavioral disorder           Diagnoses this Visit        Comments    PLMD (periodic limb movement disorder)    -  Primary     REM behavioral disorder         Parasomnia         CHARLES (obstructive sleep apnea)                To Do List           Future Appointments        Provider Department Dept Phone    3/24/2017 11:30 AM Pan Marte MD [facility] Orthoped[facility] [phone]    3/28/2017 1:40 PM Luis A Diaz MD [facility] Interventional Pain [phone]    3/29/2017 1:20 PM Elver Cobos MD [facility] Card[facility] [phone]    2017 10:20 AM Martin Machuca MD [facility] [phone]    2018 9:30 AM Amol New OD [facility] - Ophthalmology [phone]      Your Future Surgeries/Procedures     2017   Surgery with Chauncey Ellis Jr., MD   [facility])    [address] [place] [address] [phone]              Goals (5 Years of Data)     None      Follow-Up and Disposition     Return in about 2 months (around 2017) for sleep diary, start mirapex.       These Medications        Disp Refills Start End    pramipexole (MIRAPEX) 0.25 MG tablet 30 tablet 5 3/20/2017 3/20/2018    Take 1 tablet (0.25 mg total) by mouth every evening. - Oral    Pharmacy: [facility] - [place] - [address] [address]  & [facility] Ph #: [phone]         [facility]     [facility] Care [facility] -  Assistance  Registered nurses in the [facility] provide clinical advisement, health education, appointment  booking, and other advisory services.  Call for this free service at [phone].             Medications           Message regarding Medications     Verify the changes and/or additions to your medication regime listed below are the same as discussed with your clinician today.  If any of these changes or additions are incorrect, please notify your healthcare provider.        START taking these NEW medications        Refills    pramipexole (MIRAPEX) 0.25 MG tablet 5    Sig: Take 1 tablet (0.25 mg total) by mouth every evening.    Class: Normal    Route: Oral           Verify that the below list of medications is an accurate representation of the medications you are currently taking.  If none reported, the list may be blank. If incorrect, please contact your healthcare provider. Carry this list with you in case of emergency.           Current Medications     aspirin (ECOTRIN) 81 MG EC tablet Take 81 mg by mouth once daily.    blood sugar diagnostic (ACCU-CHEK FRANKO PLUS TEST STRP) Strp USE  1  STRIP ONE TIME DAILY    celecoxib (CELEBREX) 200 MG capsule Take 1 capsule (200 mg total) by mouth once daily.    clonazePAM (KLONOPIN) 0.25 MG TbDL Take 1 tablet (0.25 mg total) by mouth nightly.    docusate sodium (COLACE) 100 MG capsule Take 1 capsule (100 mg total) by mouth 2 (two) times daily as needed.    glipiZIDE (GLUCOTROL) 5 MG tablet TAKE 2 TABLETS (10 MG) TWO TIMES DAILY BEFORE MEALS    lancets (ACCU-CHEK MULTICLIX LANCET) Misc TEST ONE TIME DAILY    losartan-hydrochlorothiazide 50-12.5 mg (HYZAAR) 50-12.5 mg per tablet TAKE 1 TABLET ONE TIME DAILY    melatonin 5 mg Tab Take 5 mg by mouth every evening.    metformin (GLUCOPHAGE) 1000 MG tablet TAKE 1 TABLET TWICE DAILY WITH MEALS    metoprolol succinate (TOPROL-XL) 50 MG 24 hr tablet TAKE 1 TABLET EVERY DAY    pravastatin (PRAVACHOL) 40 MG tablet TAKE 1 TABLET EVERY DAY    sildenafil (VIAGRA) 100 MG tablet Take 1 tablet (100 mg total) by mouth as needed. Take on  "an empty stomach    oxycodone-acetaminophen (PERCOCET)  mg per tablet Take 1 tablet by mouth every 4 (four) hours as needed.    pramipexole (MIRAPEX) 0.25 MG tablet Take 1 tablet (0.25 mg total) by mouth every evening.           Clinical Reference Information           Your Vitals Were     BP Pulse Resp Height Weight SpO2    120/64 76 18 6' 3" (1.905 m) 101.6 kg (223 lb 15.8 oz) 98%    BMI                28 kg/m2          Blood Pressure          Most Recent Value    BP  120/64      Allergies as of 3/20/2017     Sulfa (Sulfonamide Antibiotics)      Immunizations Administered on Date of Encounter - 3/20/2017     None      [facility] Sign-Up     Activating your [facility] account is as easy as 1-2-3!     1) Visit [email or web address], select Sign Up Now, enter this activation code and your date of birth, then select Next.  Activation code not generated  Current Patient Portal Status: Account disabled      2) Create a username and password to use when you visit [facility] in the future and select a security question in case you lose your password and select Next.    3) Enter your e-mail address and click Sign Up!    Additional Information  If you have questions, please e-mail [email or web address] or call [phone] to talk to our [facility] staff. Remember, [facility] is NOT to be used for urgent needs. For medical emergencies, dial 911.         Instructions      Pramipexole tablets  What is this medicine?  PRAMIPEXOLE (pra mi PEX ole) is used to treat symptoms of Parkinson's disease. It is also used to treat Restless Legs Syndrome.  How should I use this medicine?  Take this medicine by mouth with a glass of water. Follow the directions on the prescription label. Take with food. Take your doses at regular intervals. Do not take your medicine more often than directed. Do not stop taking except on the advice of your doctor or health care professional.  Talk to your pediatrician regarding the use of this medicine in " children. Special care may be needed.  What side effects may I notice from receiving this medicine?  Side effects that you should report to your doctor or health care professional as soon as possible:  · confusion  · double vision or other vision problems  · fainting spells  · falling asleep during normal activities like driving  · hallucination, loss of contact with reality  · mental changes  · muscle pain or severe muscle weakness  · uncontrollable movements of the arms, face, hands, head, mouth, shoulders, or upper body  Side effects that usually do not require medical attention (report to your doctor or health care professional if they continue or are bothersome):  · constipation  · frequent urination  · mild weakness  · nausea  What may interact with this medicine?  · amantadine  · cimetidine  · diltiazem  · medicines for mental problems or psychotic disturbances  · medicines for sleep  · metoclopramide  · quinidine or quinine  · ranitidine  · triamterene  · verapamil  What if I miss a dose?  If you miss a dose, take it as soon as you can. If it is almost time for your next dose, take only that dose. Do not take double or extra doses.  Where should I keep my medicine?  Keep out of the reach of children.  Store at room temperature between 15 and 30 degrees C (59 and 86 degrees F). Protect from light. Throw away any unused medicine after the expiration date.  What should I tell my health care provider before I take this medicine?  They need to know if you have any of these conditions:  · dizzy or fainting spells  · heart disease  · kidney disease  · low blood pressure  · schizophrenia  · sleeping problems  · an unusual or allergic reaction to pramipexole, other medicines, foods, dyes, or preservatives  · pregnant or trying to get pregnant  · breast-feeding  What should I watch for while using this medicine?  Visit your doctor or health care professional for regular checks on your progress. It may be several weeks  or months before you feel the full effect of this medicine. Continue to take your medicine on a regular schedule.  You may get drowsy or dizzy. Do not drive, use machinery, or do anything that needs mental alertness until you know how this drug affects you. Do not stand or sit up quickly, especially if you are an older patient. This reduces the risk of dizzy or fainting spells. If you find that you have sudden feelings of wanting to sleep during normal activities, like cooking, watching television, or while driving or riding in a car, you should contact your health care professional.  Your mouth may get dry. Chewing sugarless gum or sucking hard candy, and drinking plenty of water may help. Contact your doctor if the problem does not go away or is severe.  There have been reports of increased sexual urges or other strong urges such as gambling while taking some medicines for Parkinson's disease. If you experience any of these urges while taking this medicine, you should report it to your health care provider as soon as possible.  You should check your skin often for changes to moles and new growths while taking this medicine. Call your doctor if you notice any of these changes.  Date Last Reviewed:   NOTE:This sheet is a summary. It may not cover all possible information. If you have questions about this medicine, talk to your doctor, pharmacist, or health care provider. Copyright© 2016 [employer]             Language Assistance Services     ATTENTION: Language assistance services are available, free of charge. Please call [phone].      ATENCIÓN: Si habla español, tiene a bahena disposición servicios gratuitos de asistencia lingüística. Llame al [phone].     CHÚ Ý: N?u b?n nói Ti?ng Vi?t, có các d?ch v? h? tr? ngôn ng? mi?n phí dành cho b?n. G?i s? [phone].         [facility] complies with applicable Federal civil rights laws and does not discriminate on the basis of race, color,  national origin, age, disability, or sex.

## 2017-03-20 NOTE — PROGRESS NOTES
Subjective:       Patient ID: Srinath Mcknight is a 76 y.o. male.    Chief Complaint: Follow-up (6 Month )    HPI  Patient is a 76-year-old male presenting today following up on his diabetes, hypertension, hyperlipidemia, CAD, sleep issues and osteoarthritis.  In regards to his diabetes he continues have excellent control.  A1c is 6.  He is tolerating his medications without adverse effects.  He's had no evidence of decompensation.    Blood pressure and cholesterol also remain under good control at this time he is taking his medication as prescribed without adverse effects.  His total HDL is a little bit low but is up from last year.  Continue stressed the importance of fiber and exercise.    His CAD is stable.  He has not had any chest pain or palpitations.  He is stable on that issue.    He is continue to follow with the sleep clinic for his sleep related issues he had mild sleep apnea on his recent study.  He has follow-up this afternoon with sleep and will see what they recommend at this point.    He continues to struggle quite a bit with osteoarthritis in his back and hips.  He has been following with some orthopedist in [place].  At this point but he has recommended surgery but is not sure he wants to pursue surgery.  He has follow-up with them in the next few weeks.  I discussed the possibility of interventional pain treatment and he isn't certain ways talking through that so we will make referral to have him see interventional pain management.    Review of Systems   Constitutional: Negative for fever and unexpected weight change.   HENT: Negative for hearing loss, postnasal drip and rhinorrhea.    Eyes: Negative for pain and visual disturbance.   Respiratory: Negative for cough, shortness of breath and wheezing.    Cardiovascular: Negative for chest pain and palpitations.   Gastrointestinal: Negative for constipation, diarrhea, nausea and vomiting.   Genitourinary: Negative for dysuria and hematuria.  "  Musculoskeletal: Positive for arthralgias and back pain. Negative for myalgias and neck stiffness.   Skin: Negative for pallor and rash.   Neurological: Negative for seizures, syncope and headaches.   Hematological: Negative for adenopathy.   Psychiatric/Behavioral: Negative for dysphoric mood. The patient is not nervous/anxious.        Objective:   /70  Pulse 78  Temp 97.1 °F (36.2 °C) (Tympanic)   Ht 6' 2" (1.88 m)  Wt 100.7 kg (222 lb)  BMI 28.5 kg/m2     Physical Exam   Constitutional: He is oriented to person, place, and time. He appears well-developed and well-nourished. No distress.   HENT:   Head: Normocephalic and atraumatic.   Mouth/Throat: Oropharynx is clear and moist.   Eyes: EOM are normal. Pupils are equal, round, and reactive to light.   Neck: Normal range of motion. Neck supple. No JVD present. No thyromegaly present.   Cardiovascular: Normal rate, regular rhythm, normal heart sounds and intact distal pulses.  Exam reveals no gallop and no friction rub.    No murmur heard.  Pulmonary/Chest: Effort normal and breath sounds normal. He has no wheezes. He has no rales.   Abdominal: Soft. Bowel sounds are normal. He exhibits no distension. There is no tenderness. There is no rebound and no guarding.   Musculoskeletal: Normal range of motion. He exhibits no edema.   Lymphadenopathy:     He has no cervical adenopathy.   Neurological: He is alert and oriented to person, place, and time. He has normal reflexes. No cranial nerve deficit.   Skin: Skin is warm and dry. No rash noted.   Psychiatric: He has a normal mood and affect. Judgment normal.   Vitals reviewed.      Lab Visit on 03/07/2017   Component Date Value    WBC 03/07/2017 4.66     RBC 03/07/2017 4.50*    Hemoglobin 03/07/2017 12.5*    Hematocrit 03/07/2017 38.8*    MCV 03/07/2017 86     MCH 03/07/2017 27.8     MCHC 03/07/2017 32.2     RDW 03/07/2017 16.3*    Platelets 03/07/2017 219     MPV 03/07/2017 10.7     Gran # " 03/07/2017 1.7*    Lymph # 03/07/2017 2.2     Mono # 03/07/2017 0.5     Eos # 03/07/2017 0.2     Baso # 03/07/2017 0.03     Gran% 03/07/2017 35.7*    Lymph% 03/07/2017 47.9     Mono% 03/07/2017 10.5     Eosinophil% 03/07/2017 4.9     Basophil% 03/07/2017 0.6     Differential Method 03/07/2017 Automated     Sodium 03/07/2017 144     Potassium 03/07/2017 4.0     Chloride 03/07/2017 108     CO2 03/07/2017 26     Glucose 03/07/2017 82     BUN, Bld 03/07/2017 17     Creatinine 03/07/2017 1.3     Calcium 03/07/2017 9.2     Total Protein 03/07/2017 7.2     Albumin 03/07/2017 3.9     Total Bilirubin 03/07/2017 0.4     Alkaline Phosphatase 03/07/2017 51*    AST 03/07/2017 24     ALT 03/07/2017 16     Anion Gap 03/07/2017 10     eGFR if African American 03/07/2017 >60.0     eGFR if non  Amer* 03/07/2017 53.8*    Cholesterol 03/07/2017 142     Triglycerides 03/07/2017 135     HDL 03/07/2017 28*    LDL Cholesterol 03/07/2017 87.0     HDL/Chol Ratio 03/07/2017 19.7*    Total Cholesterol/HDL Ra* 03/07/2017 5.1*    Non-HDL Cholesterol 03/07/2017 114     Hemoglobin A1C 03/07/2017 6.0     Estimated Avg Glucose 03/07/2017 126     PSA Total 03/07/2017 0.53     PSA, Free 03/07/2017 0.28     PSA, Free Pct 03/07/2017 52.83    Lab Visit on 03/07/2017   Component Date Value    Microalbum.,U,Random 03/07/2017 3.0     Creatinine, Random Ur 03/07/2017 136.0     Microalb Creat Ratio 03/07/2017 2.2        Assessment:       1. Type 2 diabetes mellitus without complication, without long-term current use of insulin    2. Hypertension associated with diabetes    3. Combined hyperlipidemia associated with type 2 diabetes mellitus    4. Coronary artery disease, occlusive    5. Aortic calcification    6. CHARLES (obstructive sleep apnea)    7. Osteoarthritis of spine with radiculopathy, lumbar region        Plan:   Type 2 diabetes mellitus without complication, without long-term current use of  insulin  Diabetes continues to do well at this time.  Most recent a1c is 6.0.  We will continue the current regimen.  Focus on a low carbohydrate diet and consistent exercise.        Hypertension associated with diabetes  Blood pressure is under good control.  We will continue the current regimen.  Will work on regular aerobic exercise and a low salt diet.        Combined hyperlipidemia associated with type 2 diabetes mellitus  Cholesterol numbers look good.  We will continue the current regimen at this time.  Remain focused on low fat diet and high dietary fiber intake.      Coronary artery disease, occlusive  Stable. No signs of decompensation.  Continue statin, bp meds and aspirin    Aortic calcification  No intervention necessary.  Continue statin, aspirin and bp control    CHARLES (obstructive sleep apnea)  Will see Dr Machuca today    Osteoarthritis of spine with radiculopathy, lumbar region  Needs to take celebrex daily.  Consult Dr. Diaz.    [place] was seen today for follow-up.    Diagnoses and all orders for this visit:    Type 2 diabetes mellitus without complication, without long-term current use of insulin    Hypertension associated with diabetes    Combined hyperlipidemia associated with type 2 diabetes mellitus    Coronary artery disease, occlusive    Aortic calcification    CHARLES (obstructive sleep apnea)    Osteoarthritis of spine with radiculopathy, lumbar region  -     Ambulatory referral to Pain Clinic        Return in about 4 months (around 7/20/2017).

## 2017-03-20 NOTE — PROGRESS NOTES
Subjective:       Srinath Mcknight is a 76 y.o. male    Follow-up to review sleep study.  Patient had a sleep study performed 3/1/2017.    AHI was 6.1 events per hour with 38 events.  This is borderline mild sleep apnea  We discussed the option of treating his sleep apnea  Patient feels that CPAP would be too invasive and bothersome  On the other review the PLM index was 69.3 events per hour.  PLM's with arousal was 17.7/h.  I reviewed the literature with patient we'll be efficacious to treat him for his leg movements  The addition of Mirapex was discussed  Medication will only be taken at bedtime  We'll start him off with 0.25.  We may either need to uptitrate to down titrate the medication  Importance of good sleep hygiene and continue his current regimen for the REM behavior disorder was discussed  Some events of sleep talking no sleepwalking were observed during the study    Previous Report(s) Reviewed: historical medical records and office notes     The following portions of the patient's history were reviewed and updated as appropriate:   He  has a past medical history of Anemia due to unknown mechanism (11/10/2015); Angina pectoris (2014); Arthritis; Back pain; Coronary artery disease; Diabetes mellitus (20 years); Diabetes mellitus type II (1994); DM (diabetes mellitus) (1994); Hyperlipemia; Hypertension; CHARLES (obstructive sleep apnea); Spinal cord disease; and Trouble in sleeping.  He  does not have any pertinent problems on file.  He  has a past surgical history that includes Rotator cuff repair (Left, 2006); Shoulder arthroscopy w/ rotator cuff repair (Right, 2009); and Laminectomy (07/22/2016).  His family history includes Cancer (age of onset: 50) in his brother; Diabetes in his father, mother, and sister; Drug abuse in his brother; Heart disease in his father and mother; Hypertension in his father and mother; Stroke in his father. There is no history of Prostate cancer, Macular degeneration, Retinal  detachment, Strabismus, Glaucoma, Blindness, Amblyopia, Kidney disease, Mental illness, Mental retardation, COPD, or Asthma.  He  reports that he has never smoked. He has never used smokeless tobacco. He reports that he drinks about 0.6 oz of alcohol per week  He reports that he does not use illicit drugs.  He has a current medication list which includes the following prescription(s): aspirin, blood sugar diagnostic, celecoxib, clonazepam, docusate sodium, glipizide, lancets, losartan-hydrochlorothiazide 50-12.5 mg, melatonin, metformin, metoprolol succinate, oxycodone-acetaminophen, pravastatin, sildenafil, and pramipexole.  Current Outpatient Prescriptions on File Prior to Visit   Medication Sig Dispense Refill    aspirin (ECOTRIN) 81 MG EC tablet Take 81 mg by mouth once daily.      blood sugar diagnostic (ACCU-CHEK FRANKO PLUS TEST STRP) Strp USE  1  STRIP ONE TIME DAILY 100 strip 4    celecoxib (CELEBREX) 200 MG capsule Take 1 capsule (200 mg total) by mouth once daily. 90 capsule 3    clonazePAM (KLONOPIN) 0.25 MG TbDL Take 1 tablet (0.25 mg total) by mouth nightly. (Patient taking differently: Take 0.5 mg by mouth nightly. ) 90 tablet 1    docusate sodium (COLACE) 100 MG capsule Take 1 capsule (100 mg total) by mouth 2 (two) times daily as needed. 60 capsule 0    glipiZIDE (GLUCOTROL) 5 MG tablet TAKE 2 TABLETS (10 MG) TWO TIMES DAILY BEFORE MEALS 360 tablet 4    lancets (ACCU-CHEK MULTICLIX LANCET) Misc TEST ONE TIME DAILY 100 each 3    losartan-hydrochlorothiazide 50-12.5 mg (HYZAAR) 50-12.5 mg per tablet TAKE 1 TABLET ONE TIME DAILY 90 tablet 4    melatonin 5 mg Tab Take 5 mg by mouth every evening.      metformin (GLUCOPHAGE) 1000 MG tablet TAKE 1 TABLET TWICE DAILY WITH MEALS 180 tablet 4    metoprolol succinate (TOPROL-XL) 50 MG 24 hr tablet TAKE 1 TABLET EVERY DAY 90 tablet 3    oxycodone-acetaminophen (PERCOCET)  mg per tablet Take 1 tablet by mouth every 4 (four) hours as needed. 90  "tablet 0    pravastatin (PRAVACHOL) 40 MG tablet TAKE 1 TABLET EVERY DAY 90 tablet 4    sildenafil (VIAGRA) 100 MG tablet Take 1 tablet (100 mg total) by mouth as needed. Take on an empty stomach 6 tablet 11     No current facility-administered medications on file prior to visit.      He is allergic to sulfa (sulfonamide antibiotics)..    Review of Systems  A comprehensive review of systems was negative.      Objective:      /64  Pulse 76  Resp 18  Ht 6' 3" (1.905 m)  Wt 101.6 kg (223 lb 15.8 oz)  SpO2 98%  BMI 28 kg/m2  General appearance: alert, appears stated age, cooperative and no distress  Head: Normocephalic, without obvious abnormality, atraumatic  Eyes: negative findings: conjunctivae and sclerae normal  Lungs: clear to auscultation bilaterally and normal percussion bilaterally  Extremities: extremities normal, atraumatic, no cyanosis or edema  Pulses: 2+ and symmetric  Skin: Skin color, texture, turgor normal. No rashes or lesions  Neurologic: Grossly normal        Sleep study reviewed  SUMMARY STATEMENTS:  1. Findings related to sleep diagnoses:   Moderate intermittent snoring   The overall AHI was 6.1 (38 events)   95.4% of sleep was recorded in supine position  2. EEG abnormalities:   Absent slow-wave sleep. Sleep latency was normal. REM latency was delayed.   Increased chin EMG tone consistent with REM without Ledy   Severe periodic limb movements with significant arousal   Arousal index was very high: 58.0 events per hour.  3. ECG abnormalities:   Frequent PVCs and sinus bradycardia   Average heart rate was 62 bpm with a maximal heart rate 85 bpm  4. Behavioral observations:  a. Recording Tech Comments  Sleepwalking and sleep talking observed 02:01 AM.  REM without atonia and sleep talking observed during REM sleep at 03:47 AM  INTERPRETATION:  1. Borderline/mild obstructive sleep apnea.  2. 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 Severe periodic limb " movement disorder with arousals  Assessment:      Problem List Items Addressed This Visit     REM behavioral disorder (Chronic)     Safety interventions discussed last visit  Klonopin and Melatonin           PLMD (periodic limb movement disorder) - Primary     PLMI was 69/hr and PLMAi was 17.7/hr  Had 430 leg movements with 110 causing arousal  Reviewed literature  It is efficacious to treat: Mirapex         Relevant Medications    pramipexole (MIRAPEX) 0.25 MG tablet    CHARLES (obstructive sleep apnea)     AHI was 6.1/hr ( 38 events)  Borderline CHARLES   Declines interventions                Plan:      Return in about 3 months (around 6/20/2017) for sleep diary, start mirapex.  Discussed side effect profile : compulsive disorders, gambling  Discussed diagnosis, its evaluation, treatment and usual course. All questions answered.    This note was prepared using voice recognition system and is likely to have sound alike errors that may have been overlooked even after proof reading.  Please call me with any questions    Thank you for the courtesy of participating in the care of this patient    Martin Machuca MD

## 2017-03-20 NOTE — PATIENT INSTRUCTIONS
Pramipexole tablets  What is this medicine?  PRAMIPEXOLE (pra mi PEX ole) is used to treat symptoms of Parkinson's disease. It is also used to treat Restless Legs Syndrome.  How should I use this medicine?  Take this medicine by mouth with a glass of water. Follow the directions on the prescription label. Take with food. Take your doses at regular intervals. Do not take your medicine more often than directed. Do not stop taking except on the advice of your doctor or health care professional.  Talk to your pediatrician regarding the use of this medicine in children. Special care may be needed.  What side effects may I notice from receiving this medicine?  Side effects that you should report to your doctor or health care professional as soon as possible:  · confusion  · double vision or other vision problems  · fainting spells  · falling asleep during normal activities like driving  · hallucination, loss of contact with reality  · mental changes  · muscle pain or severe muscle weakness  · uncontrollable movements of the arms, face, hands, head, mouth, shoulders, or upper body  Side effects that usually do not require medical attention (report to your doctor or health care professional if they continue or are bothersome):  · constipation  · frequent urination  · mild weakness  · nausea  What may interact with this medicine?  · amantadine  · cimetidine  · diltiazem  · medicines for mental problems or psychotic disturbances  · medicines for sleep  · metoclopramide  · quinidine or quinine  · ranitidine  · triamterene  · verapamil  What if I miss a dose?  If you miss a dose, take it as soon as you can. If it is almost time for your next dose, take only that dose. Do not take double or extra doses.  Where should I keep my medicine?  Keep out of the reach of children.  Store at room temperature between 15 and 30 degrees C (59 and 86 degrees F). Protect from light. Throw away any unused medicine after the expiration  date.  What should I tell my health care provider before I take this medicine?  They need to know if you have any of these conditions:  · dizzy or fainting spells  · heart disease  · kidney disease  · low blood pressure  · schizophrenia  · sleeping problems  · an unusual or allergic reaction to pramipexole, other medicines, foods, dyes, or preservatives  · pregnant or trying to get pregnant  · breast-feeding  What should I watch for while using this medicine?  Visit your doctor or health care professional for regular checks on your progress. It may be several weeks or months before you feel the full effect of this medicine. Continue to take your medicine on a regular schedule.  You may get drowsy or dizzy. Do not drive, use machinery, or do anything that needs mental alertness until you know how this drug affects you. Do not stand or sit up quickly, especially if you are an older patient. This reduces the risk of dizzy or fainting spells. If you find that you have sudden feelings of wanting to sleep during normal activities, like cooking, watching television, or while driving or riding in a car, you should contact your health care professional.  Your mouth may get dry. Chewing sugarless gum or sucking hard candy, and drinking plenty of water may help. Contact your doctor if the problem does not go away or is severe.  There have been reports of increased sexual urges or other strong urges such as gambling while taking some medicines for Parkinson's disease. If you experience any of these urges while taking this medicine, you should report it to your health care provider as soon as possible.  You should check your skin often for changes to moles and new growths while taking this medicine. Call your doctor if you notice any of these changes.  Date Last Reviewed:   NOTE:This sheet is a summary. It may not cover all possible information. If you have questions about this medicine, talk to your doctor, pharmacist, or  health care provider. Copyright© 2016 [employer]

## 2017-03-23 DIAGNOSIS — M25.522 LEFT ELBOW PAIN: Primary | ICD-10-CM

## 2017-03-24 ENCOUNTER — HOSPITAL ENCOUNTER (OUTPATIENT)
Dept: RADIOLOGY | Facility: HOSPITAL | Age: 77
Discharge: HOME | End: 2017-03-24
Attending: ORTHOPAEDIC SURGERY
Payer: MEDICARE

## 2017-03-24 ENCOUNTER — OFFICE VISIT (OUTPATIENT)
Dept: ORTHOPEDICS | Facility: CLINIC | Age: 77
End: 2017-03-24
Payer: MEDICARE

## 2017-03-24 VITALS
HEIGHT: 75 IN | SYSTOLIC BLOOD PRESSURE: 131 MMHG | WEIGHT: 224.44 LBS | DIASTOLIC BLOOD PRESSURE: 67 MMHG | HEART RATE: 68 BPM | BODY MASS INDEX: 27.9 KG/M2

## 2017-03-24 DIAGNOSIS — M70.22 OLECRANON BURSITIS, LEFT ELBOW: Primary | ICD-10-CM

## 2017-03-24 DIAGNOSIS — M25.522 LEFT ELBOW PAIN: ICD-10-CM

## 2017-03-24 DIAGNOSIS — E11.9 TYPE 2 DIABETES MELLITUS WITHOUT COMPLICATION, WITHOUT LONG-TERM CURRENT USE OF INSULIN: ICD-10-CM

## 2017-03-24 PROCEDURE — 4010F ACE/ARB THERAPY RXD/TAKEN: CPT | Mod: S$GLB,,, | Performed by: PHYSICIAN ASSISTANT

## 2017-03-24 PROCEDURE — 99499 UNLISTED E&M SERVICE: CPT | Mod: S$GLB,,, | Performed by: PHYSICIAN ASSISTANT

## 2017-03-24 PROCEDURE — 99999 PR PBB SHADOW E&M-EST. PATIENT-LVL III: CPT | Mod: PBBFAC,,, | Performed by: PHYSICIAN ASSISTANT

## 2017-03-24 PROCEDURE — 3078F DIAST BP <80 MM HG: CPT | Mod: S$GLB,,, | Performed by: PHYSICIAN ASSISTANT

## 2017-03-24 PROCEDURE — 3044F HG A1C LEVEL LT 7.0%: CPT | Mod: S$GLB,,, | Performed by: PHYSICIAN ASSISTANT

## 2017-03-24 PROCEDURE — 1157F ADVNC CARE PLAN IN RCRD: CPT | Mod: S$GLB,,, | Performed by: PHYSICIAN ASSISTANT

## 2017-03-24 PROCEDURE — 1126F AMNT PAIN NOTED NONE PRSNT: CPT | Mod: S$GLB,,, | Performed by: PHYSICIAN ASSISTANT

## 2017-03-24 PROCEDURE — 1160F RVW MEDS BY RX/DR IN RCRD: CPT | Mod: S$GLB,,, | Performed by: PHYSICIAN ASSISTANT

## 2017-03-24 PROCEDURE — 1159F MED LIST DOCD IN RCRD: CPT | Mod: S$GLB,,, | Performed by: PHYSICIAN ASSISTANT

## 2017-03-24 PROCEDURE — 73070 X-RAY EXAM OF ELBOW: CPT | Mod: 26,LT,, | Performed by: RADIOLOGY

## 2017-03-24 PROCEDURE — 99213 OFFICE O/P EST LOW 20 MIN: CPT | Mod: S$GLB,,, | Performed by: PHYSICIAN ASSISTANT

## 2017-03-24 PROCEDURE — 3075F SYST BP GE 130 - 139MM HG: CPT | Mod: S$GLB,,, | Performed by: PHYSICIAN ASSISTANT

## 2017-03-24 NOTE — MR AVS SNAPSHOT
[facility] - Orthoped[facility]  [address] [facility] [place] [address]  Phone: [phone]  Fax: [phone]                  Srinath Mcknight   3/24/2017 10:30 AM   Office Visit    Description:  Male : 1940   Provider:  An Mcmahan PA-C   Department:  [facility]           Reason for Visit     Left Elbow - Swelling           Diagnoses this Visit        Comments    Olecranon bursitis, left elbow    -  Primary     Type 2 diabetes mellitus without complication, without long-term current use of insulin                To Do List           Future Appointments        Provider Department Dept Phone    3/28/2017 1:40 PM MD ANUSHA [facility] - Interventional Pain [phone]    3/29/2017 1:20 PM MD ANUSHA [facility] - Cardiology [phone]    2017 10:20 AM Martin Machuca MD [facility] - Sleep Clinic [phone]    2018 9:30 AM Amol New OD [facility] - Ophthalmology [phone]      Your Future Surgeries/Procedures     2017   Surgery with Chauncey Ellis Jr., MD   [facility]    [facility] [facility] [address] [phone]              Goals (5 Years of Data)     None      [facility]     [facility] Care [facility] -  Assistance  Registered nurses in the [facility] provide clinical advisement, health education, appointment booking, and other advisory services.  Call for this free service at [phone].             Medications           Message regarding Medications     Verify the changes and/or additions to your medication regime listed below are the same as discussed with your clinician today.  If any of these changes or additions are incorrect, please notify your healthcare provider.             Verify that the below list of medications is an accurate representation of the medications you are currently taking.  If none reported, the list may be blank. If incorrect,  "please contact your healthcare provider. Carry this list with you in case of emergency.           Current Medications     aspirin (ECOTRIN) 81 MG EC tablet Take 81 mg by mouth once daily.    blood sugar diagnostic (ACCU-CHEK FRANKO PLUS TEST STRP) Strp USE  1  STRIP ONE TIME DAILY    celecoxib (CELEBREX) 200 MG capsule Take 1 capsule (200 mg total) by mouth once daily.    clonazePAM (KLONOPIN) 0.25 MG TbDL Take 1 tablet (0.25 mg total) by mouth nightly.    docusate sodium (COLACE) 100 MG capsule Take 1 capsule (100 mg total) by mouth 2 (two) times daily as needed.    glipiZIDE (GLUCOTROL) 5 MG tablet TAKE 2 TABLETS (10 MG) TWO TIMES DAILY BEFORE MEALS    lancets (ACCU-CHEK MULTICLIX LANCET) Misc TEST ONE TIME DAILY    losartan-hydrochlorothiazide 50-12.5 mg (HYZAAR) 50-12.5 mg per tablet TAKE 1 TABLET ONE TIME DAILY    melatonin 5 mg Tab Take 5 mg by mouth every evening.    metformin (GLUCOPHAGE) 1000 MG tablet TAKE 1 TABLET TWICE DAILY WITH MEALS    metoprolol succinate (TOPROL-XL) 50 MG 24 hr tablet TAKE 1 TABLET EVERY DAY    oxycodone-acetaminophen (PERCOCET)  mg per tablet Take 1 tablet by mouth every 4 (four) hours as needed.    pramipexole (MIRAPEX) 0.25 MG tablet Take 1 tablet (0.25 mg total) by mouth every evening.    pravastatin (PRAVACHOL) 40 MG tablet TAKE 1 TABLET EVERY DAY    sildenafil (VIAGRA) 100 MG tablet Take 1 tablet (100 mg total) by mouth as needed. Take on an empty stomach           Clinical Reference Information           Your Vitals Were     BP Pulse Height Weight BMI    131/67 68 6' 3" (1.905 m) 101.8 kg (224 lb 6.9 oz) 28.05 kg/m2      Blood Pressure          Most Recent Value    BP  131/67      Allergies as of 3/24/2017     Sulfa (Sulfonamide Antibiotics)      Immunizations Administered on Date of Encounter - 3/24/2017     None      Language Assistance Services     ATTENTION: Language assistance services are available, free of charge. Please call [phone].      ATENCIÓN: Si " habla alphonse, tiene a bahena disposición servicios gratuitos de asistencia lingüística. Llame al [phone].     CHÚ Ý: N?u b?n nói Ti?ng Vi?t, có các d?ch v? h? tr? ngôn ng? mi?n phí dành cho b?n. G?i s? [phone].         [facility] complies with applicable Federal civil rights laws and does not discriminate on the basis of race, color, national origin, age, disability, or sex.

## 2017-03-24 NOTE — PROGRESS NOTES
Subjective:      Patient ID: Srinath Mcknight is a 76 y.o. male.    Chief Complaint: Swelling of the Left Elbow      HPI: Srinath Mcknight  is a 76 y.o. male who c/o Swelling of the Left Elbow   for duration of 4 days.  He noticed swelling in the left olecranon bursa on Monday.  He denies associated fevers.  He has no complaints of pain.  No associated loss of motion.  He has full strength.  Pain level is 0 out of 10.  Quality is none.  Duration a few days.  Alleviating factors nothing.  Aggravating factors nothing.  He did go to an after-hours setting and they told him he needed to follow-up with orthopedics.    Review of Systems   Constitution: Negative for fever.   HENT: Negative for headaches.    Cardiovascular: Negative for chest pain.   Respiratory: Negative for cough and shortness of breath.    Skin: Negative for rash.   Musculoskeletal: Positive for joint swelling. Negative for joint pain and stiffness.   Gastrointestinal: Negative for heartburn.   Neurological: Negative for numbness.         Objective:        General    Nursing note and vitals reviewed.  Constitutional: He is oriented to person, place, and time. He appears well-developed and well-nourished.   HENT:   Head: Normocephalic and atraumatic.   Eyes: EOM are normal.   Cardiovascular: Normal rate and regular rhythm.    Pulmonary/Chest: Effort normal.   Abdominal: Soft.   Neurological: He is alert and oriented to person, place, and time.   Psychiatric: He has a normal mood and affect. His behavior is normal.         Left Hand/Wrist Exam     Range of Motion     Wrist   Extension: normal   Flexion: normal   Abduction: normal  Adduction: normal      Right Elbow Exam     Inspection   Scars: absent  Effusion: present (olecranon bursitis)  Bruising: absent  Deformity: absent  Atrophy: absent    Range of Motion   Extension: normal   Flexion: normal   Pronation: normal   Supination: normal     Other   Sensation: normal      Left Elbow Exam     Swelling    The patient is swollen on the olecranon    Range of Motion   Extension: normal   Flexion: normal   Pronation: normal   Supination: normal     Tests   Tennis Elbow: negative  Golfer's Elbow: negative    Other   Sensation: normal    Comments:  No TTP over the olecranon.  Minimal Swelling noted within the olecranon bursa.  No erythema, no purulence, no s/sx infection.  No tenderness to palpation over the tricep tendon distally.        Muscle Strength   Right Upper Extremity   Wrist Extension: 5/5/5   Wrist Flexion: 5/5/5   : 5/5/5   Elbow Pronation:  5/5   Elbow Supination:  5/5   Elbow Extension: 5/5  Elbow Flexion: 5/5  Left Upper Extremity  Wrist Extension: 5/5/5   Wrist Flexion: 5/5/5   :  5/5/5   Elbow Pronation:  5/5   Elbow Supination:  5/5   Elbow Extension: 5/5  Elbow Flexion: 5/5    Vascular Exam     Right Pulses      Radial:                    2+      Left Pulses      Radial:                    2+      Capillary Refill  Right Hand: normal capillary refill  Left Hand: normal capillary refill            Xray:   Left elbow from today images and report were reviewed today.  I agree with the radiologist's interpretation.  There is no evidence to suggest acute fracture or dislocation.  There is mild degenerative change along the ulna humeral side of the joint.  A large enthesophyte is noted in the region of the olecranon.  There some soft tissue swelling posterior to the olecranon.  Bursitis cannot be excluded.    Assessment:       Encounter Diagnoses   Name Primary?    Olecranon bursitis, left elbow Yes    Type 2 diabetes mellitus without complication, without long-term current use of insulin           Plan:       [place] was seen today for swelling.    Diagnoses and all orders for this visit:    Olecranon bursitis, left elbow    Type 2 diabetes mellitus without complication, without long-term current use of insulin     Mr. Yo has come in today for new problem of left elbow.  He has mild  olecranon bursitis.  I have recommended he avoid resting the arm on hard surfaces.  He has no other symptoms other than mild swelling.  We could potentially aspirate the olecranon bursa, but there is really not that much fluid in it today.  Additionally, he has an olecranon spur which may predispose him to have the swelling come back on him.  I've explained we could try to do a little aspiration with steroid injection, but that would be likely to raise his blood sugars.  He would like to avoid that.  As far as functional activities, this will not limit him.  Can do whatever he wants to do that is not painful.  Certainly, if the pain worsens, swelling worsens, or he begins to have signs or symptoms of infection, I would be happy to see him back.  Otherwise, he can follow-up with me on an as-needed basis.  Patient verbalizes understanding and agrees with the above.    Return if symptoms worsen or fail to improve.          The patient understands, chooses and consents to this plan and accepts all   the risks which include but are not limited to the risks mentioned above.     Disclaimer: This note was prepared using a voice recognition system and is likely to have sound alike errors within the text.

## 2017-03-28 ENCOUNTER — OFFICE VISIT (OUTPATIENT)
Dept: PAIN MEDICINE | Facility: CLINIC | Age: 77
End: 2017-03-28
Payer: MEDICARE

## 2017-03-28 VITALS
HEART RATE: 81 BPM | SYSTOLIC BLOOD PRESSURE: 131 MMHG | BODY MASS INDEX: 27.85 KG/M2 | DIASTOLIC BLOOD PRESSURE: 72 MMHG | WEIGHT: 224 LBS | RESPIRATION RATE: 18 BRPM | HEIGHT: 75 IN

## 2017-03-28 DIAGNOSIS — M96.1 FAILED BACK SYNDROME OF LUMBAR SPINE: ICD-10-CM

## 2017-03-28 DIAGNOSIS — M47.816 FACET ARTHROPATHY, LUMBAR: Primary | ICD-10-CM

## 2017-03-28 DIAGNOSIS — M51.36 DDD (DEGENERATIVE DISC DISEASE), LUMBAR: ICD-10-CM

## 2017-03-28 DIAGNOSIS — M47.817 LUMBOSACRAL SPONDYLOSIS WITHOUT MYELOPATHY: ICD-10-CM

## 2017-03-28 PROCEDURE — 3075F SYST BP GE 130 - 139MM HG: CPT | Mod: S$GLB,,, | Performed by: PHYSICIAN ASSISTANT

## 2017-03-28 PROCEDURE — 99214 OFFICE O/P EST MOD 30 MIN: CPT | Mod: S$GLB,,, | Performed by: PHYSICIAN ASSISTANT

## 2017-03-28 PROCEDURE — 99999 PR PBB SHADOW E&M-EST. PATIENT-LVL IV: CPT | Mod: PBBFAC,,, | Performed by: PHYSICIAN ASSISTANT

## 2017-03-28 PROCEDURE — 3078F DIAST BP <80 MM HG: CPT | Mod: S$GLB,,, | Performed by: PHYSICIAN ASSISTANT

## 2017-03-28 PROCEDURE — 1160F RVW MEDS BY RX/DR IN RCRD: CPT | Mod: S$GLB,,, | Performed by: PHYSICIAN ASSISTANT

## 2017-03-28 PROCEDURE — 1159F MED LIST DOCD IN RCRD: CPT | Mod: S$GLB,,, | Performed by: PHYSICIAN ASSISTANT

## 2017-03-28 PROCEDURE — 1157F ADVNC CARE PLAN IN RCRD: CPT | Mod: S$GLB,,, | Performed by: PHYSICIAN ASSISTANT

## 2017-03-28 PROCEDURE — 1125F AMNT PAIN NOTED PAIN PRSNT: CPT | Mod: S$GLB,,, | Performed by: PHYSICIAN ASSISTANT

## 2017-03-28 PROCEDURE — 99499 UNLISTED E&M SERVICE: CPT | Mod: S$GLB,,, | Performed by: PHYSICIAN ASSISTANT

## 2017-03-28 RX ORDER — GABAPENTIN 100 MG/1
CAPSULE ORAL
Qty: 60 CAPSULE | Refills: 1 | Status: SHIPPED | OUTPATIENT
Start: 2017-03-28 | End: 2017-07-11 | Stop reason: SDUPTHER

## 2017-03-28 NOTE — MR AVS SNAPSHOT
[facility]  [address] [facility] [address]  Phone: [phone]  Fax: [phone]                  Srinath Mcknight   3/28/2017 1:40 PM   Office Visit    Description:  Male : 1940   Provider:  Leatha Franks PA-C   Department:  [facility]           Reason for Visit     Low-back Pain                To Do List           Future Appointments        Provider Department Dept Phone    3/29/2017 1:20 PM Elver Cobos MD [facility] [phone]    2017 11:00 AM Leatha Franks PA-C [facility] [phone]    2017 10:20 AM Martin Machuca MD [facility] [phone]    2018 9:30 AM Amol New OD [facility] [phone]      Your Future Surgeries/Procedures     2017   Surgery with Chauncey Ellis Jr., MD   [facility] ([facility])    [address] [place] [address] [phone]              Goals (5 Years of Data)     None      [facility]     [facility] Nurse Care [facility] -  Assistance  Registered nurses in the [facility] provide clinical advisement, health education, appointment booking, and other advisory services.  Call for this free service at [phone].             Medications           Message regarding Medications     Verify the changes and/or additions to your medication regime listed below are the same as discussed with your clinician today.  If any of these changes or additions are incorrect, please notify your healthcare provider.             Verify that the below list of medications is an accurate representation of the medications you are currently taking.  If none reported, the list may be blank. If incorrect, please contact your healthcare provider. Carry this list with you in case of emergency.           Current Medications     aspirin (ECOTRIN) 81 MG EC tablet Take 81 mg  "by mouth once daily.    blood sugar diagnostic (ACCU-CHEK FRANKO PLUS TEST STRP) Strp USE  1  STRIP ONE TIME DAILY    celecoxib (CELEBREX) 200 MG capsule Take 1 capsule (200 mg total) by mouth once daily.    clonazePAM (KLONOPIN) 0.25 MG TbDL Take 1 tablet (0.25 mg total) by mouth nightly.    docusate sodium (COLACE) 100 MG capsule Take 1 capsule (100 mg total) by mouth 2 (two) times daily as needed.    glipiZIDE (GLUCOTROL) 5 MG tablet TAKE 2 TABLETS (10 MG) TWO TIMES DAILY BEFORE MEALS    lancets (ACCU-CHEK MULTICLIX LANCET) Misc TEST ONE TIME DAILY    losartan-hydrochlorothiazide 50-12.5 mg (HYZAAR) 50-12.5 mg per tablet TAKE 1 TABLET ONE TIME DAILY    melatonin 5 mg Tab Take 5 mg by mouth every evening.    metformin (GLUCOPHAGE) 1000 MG tablet TAKE 1 TABLET TWICE DAILY WITH MEALS    metoprolol succinate (TOPROL-XL) 50 MG 24 hr tablet TAKE 1 TABLET EVERY DAY    oxycodone-acetaminophen (PERCOCET)  mg per tablet Take 1 tablet by mouth every 4 (four) hours as needed.    pramipexole (MIRAPEX) 0.25 MG tablet Take 1 tablet (0.25 mg total) by mouth every evening.    pravastatin (PRAVACHOL) 40 MG tablet TAKE 1 TABLET EVERY DAY    sildenafil (VIAGRA) 100 MG tablet Take 1 tablet (100 mg total) by mouth as needed. Take on an empty stomach           Clinical Reference Information           Your Vitals Were     BP Pulse Resp Height Weight BMI    131/72 (BP Location: Right arm, Patient Position: Sitting, BP Method: Automatic) 81 18 6' 3" (1.905 m) 101.6 kg (224 lb) 28 kg/m2      Blood Pressure          Most Recent Value    BP  131/72      Allergies as of 3/28/2017     Sulfa (Sulfonamide Antibiotics)      Immunizations Administered on Date of Encounter - 3/28/2017     None      Language Assistance Services     ATTENTION: Language assistance services are available, free of charge. Please call [phone].      ATENCIÓN: Si habla español, tiene a bahena disposición servicios gratuitos de asistencia lingüística. Llame al " [phone].     AMARILIS Ý: N?u b?n nói Ti?ng Vi?t, có các d?ch v? h? tr? ngôn ng? mi?n phí dành cho b?n. G?i s? [phone].         [facility] complies with applicable Federal civil rights laws and does not discriminate on the basis of race, color, national origin, age, disability, or sex.

## 2017-03-28 NOTE — PROGRESS NOTES
Chief Pain Complaint:  Low Back Pain, Bilateral Leg Pain    History of Present Illness:   This patient is a 76 y.o. male who presents today complaining of the above noted pain/s. The patient describes the pain as follows.    - duration of pain: ~ 20 years   - timing: intermittent   - character: aching, sharp  - radiating, dermatomal: extends into bilateral lower extremities, crossing over dermatomes (somewhat in anterior thigh then in posterior knee area)  - antecedent trauma, prior spinal surgery: patient reports prior trauma, prior lumbar surgery (L3/4, L4/5 laminectomy/ facetectomy on 7-22-16 with Dr. Bacon)  - pertinent negatives: No fever, No chills, No weight loss, No bladder dysfunction, No bowel dysfunction, No saddle anesthesia  - pertinent positives: generalized nonspecific Lower Extremity weakness bilaterally    - aggravating factors: walking  - medications, other therapies tried (physical therapy, injections):     >> Percocet, Tylenol    >> Has NOT previously undergone Physical Therapy    >> Has previously undergone spinal injection/s: TF ESIs, facet injections, hip injection (all with minimal, fleeting pain relief) with Santy Quiles Giurgus      Imaging / Labs / Studies (reviewed on 3/28/2017):    5/02/16 MRI Lumbar Spine Without Contrast    Narrative MRI OF THE LUMBAR SPINE WITHOUT CONTRAST.     Technique:  Sagittal T1, sagittal T2, sagittal STIR, axial T1 and axial T2 weighted images of the lumbar spine obtained without contrast.   Comparison: MRI Lumbar spine without contrast from 11/12/14   Findings:  The there is minimal (grade I) the retrolisthesis of L5 on S1.  Lumbar spinal alignment is otherwise within normal limits.  There is no evidence of acute fracture.  Vertebral body heights are maintained.  No abnormal marrow signals identified to suggest an infiltrative process.  The conus is normal in appearance and terminates at the L1 level.  There is intervertebral disc space narrowing  and desiccation of discs within the lower lumbar spine.  There findings of multilevel lumbar spondylosis as detailed below.  L1-L2: No significant central canal stenosis or neuroforaminal narrowing.  L2-L3: There is a mild broad-based disc bulge without significant central canal stenosis or neuroforaminal narrowing.   L3-L4: There is a broad-based disc bulge and bilateral facet arthropathy with associated synovial cyst arising from the left facet joint measuring approximately 5 x 9 mm, similar to the prior examination.  This results in moderate central canal stenosis, slightly progressed from the prior examination.  There is mild bilateral neuroforaminal narrowing.   L4-L5: There is a broad-based disc bulge and prominent bilateral facet arthropathy resulting in severe central canal stenosis and mild bilateral neuroforaminal narrowing.   L5-S1: There is a broad-based disc bulge and a bilateral facet arthropathy resulting in mild bilateral neuroforaminal narrowing.  No significant central canal stenosis.   The visualized intra-abdominal contents demonstrate no significant abnormalities.  The bilateral SI joints are unremarkable.  The paraspinal musculature is within normal limits.       9/03/14 X-Ray Lumbar Spine Complete 5 View    Narrative Findings:     As compared with 11/18/09, no significant interim change is identified.  Diffuse chronic changes again demonstrated as previously described.       4/26/16 X-Ray Lumbar Spine Ap Lateral w/Flex Ext    Narrative 4 views: Alignment is normal.  There is mild-moderate DJD.  No instability seen.  No fracture dislocation bone destruction seen.       5/22/13 X-Ray Cervical Spine AP And Lateral    Narrative Findings:  Changes of degenerative disk disease are present from the C3-4 level inferiorly to the C5-6 level with these most pronounced at the C5-6 level.  AP alignment is maintained with no acute fractures or prevertebral soft tissue swelling identified.  Lateral masses of  "C1 are well aligned.  Impression:  Multilevel degenerative disk disease appearing most pronounced at the C5-6 level.         Review of Systems:  CONSTITUTIONAL: patient denies any fever, chills, or weight loss  SKIN: patient denies any rash or itching  RESPIRATORY: patient reports dyspnea with exertion  GASTROINTESTINAL: patient reports constipation  GENITOURINARY: patient denies having any abnormal bladder function    MUSCULOSKELETAL:  - patient complains of the above noted pain/s (see chief pain complaint)    NEUROLOGICAL:   - pain as above  - strength in Lower extremities is decreased, BILATERALLY  - sensation in Lower extremities is abnormal, BILATERALLY  - patient denies any loss of bowel or bladder control      PSYCHIATRIC: patient denies any change in mood    Other:  All other systems reviewed and are negative      Physical Exam:    Vitals:  /72 (BP Location: Right arm, Patient Position: Sitting, BP Method: Automatic)  Pulse 81  Resp 18  Ht 6' 3" (1.905 m)  Wt 101.6 kg (224 lb)  BMI 28 kg/m2   (reviewed on 3/28/2017)    General: alert and oriented, in no apparent distress  Gait: normal gait  Skin: No rashes, No discoloration, No obvious lesions  HEENT: EOMI  Cardiovascular: no significant peripheral edema present  Respiratory: respirations nonlabored    Musculoskeletal:  - Any pain on flexion, extension, rotation:    >> pain on extension and rotation    >> facet loading causes pain, R>L  - Straight Leg Raise:     >> LEFT :: negative    >> RIGHT :: negative  - Any tenderness to palpation across paraspinal muscles, joints, bursae:     >> across lumbar paraspinals    >> over bilateral SIJ    Hip:  - Guzman's test:    >> negative on the right    >> positive on the left, limited  - Internal/external rotation:    >> none on the right    >> pain with both maneuvers on the left, limited    Neuro:  - Extremity Strength:     >> LEFT :: 5/5    >> RIGHT :: 5/5  - Extremity Reflexes:    >> LEFT  :: 2+    >> " "RIGHT :: 2+  - Sensory (sensation to light touch):    >> LEFT :: intact    >> RIGHT :: intact  - Perez's sign:     >> LEFT :: negative    >> RIGHT :: negative      Psych:  Mood and affect is appropriate      Assessment:  Failed Back Surgery Syndrome  Lumbar Degenerative Disc Disease   Lumbar Spondylosis  Lumbar Facet Joint Syndrome  Chronic Pain Syndrome    Hip OA      Plan:  Patient returns to clinic after almost 2 year absence. He has chronic low back, hip, and knee pain. He reports that the pain "jumps around". He was previously a patient of Dr. Cedillo. He has had several injections, including TF ESIs, facet injections, and hip injection with minimal, fleeting pain relief.  - We discussed several options for treatment, including injections, which he reports he has tried them all. It also does not seem plausible to try this since he is not getting any lasting relief from any type.  - Start PT with passive modalities, including ice and heat, and active modalities, including a regimen for stretching and strengthening.   - Will start gabapentin 200mg QHS (with titration instructions, take 1 tablet QHS x 1 week, then increase to 2 capsules at night thereafter, increasing as tolerated). He reports that he has never tried this before.   - He is not interested in pain medication because relief is so short-term.   - He is taking Miripex for RLS that he recently started.   - Hernia repair surgery is scheduled for 4/18/17.  - Over 50% of the time was spent counseling/educating the patient. Total time spent with the patient was greater than 25 minutes.  RTC in 8 weeks for follow-up. I discussed the risks, benefits, and alternatives to potential treatment options. All questions and concerns were fully addressed today in clinic. Dr. Diaz was consulted regarding the patient plan and agrees.              >>Pain Disability Index:  3/28/2017 :: 30    >>Opioid Risk Tool:  3/28/2017 :: 0    "

## 2017-03-28 NOTE — LETTER
March 29, 2017      Yeison Wilder MD  [address]  [address]  [place] [address]           [facility]  [address] [facility] [address]  Phone: [phone]  Fax: [phone]          Patient: Srinath Mcknight   MR Number: [id number]   YOB: 1940   Date of Visit: 3/28/2017       Dear Dr. Yeison Wilder:    Thank you for referring Srinath Mcknight to me for evaluation. Attached you will find relevant portions of my assessment and plan of care.    If you have questions, please do not hesitate to call me. I look forward to following Srinath Mcknight along with you.    Sincerely,    Leatha Franks PA-C    Enclosure  CC:  No Recipients    If you would like to receive this communication electronically, please contact [email or web address] or [phone] to request more information on [employer] Link access.    For providers and/or their staff who would like to refer a patient to [facility], please contact us through our one-stop-shop provider referral line, [facility] , at [phone].    If you feel you have received this communication in error or would no longer like to receive these types of communications, please e-mail [email or web address]

## 2017-03-29 ENCOUNTER — OFFICE VISIT (OUTPATIENT)
Dept: CARDIOLOGY | Facility: CLINIC | Age: 77
End: 2017-03-29
Payer: MEDICARE

## 2017-03-29 ENCOUNTER — CLINICAL SUPPORT (OUTPATIENT)
Dept: CARDIOLOGY | Facility: CLINIC | Age: 77
End: 2017-03-29
Payer: MEDICARE

## 2017-03-29 VITALS
HEART RATE: 64 BPM | WEIGHT: 222.69 LBS | SYSTOLIC BLOOD PRESSURE: 132 MMHG | HEIGHT: 74 IN | BODY MASS INDEX: 28.58 KG/M2 | DIASTOLIC BLOOD PRESSURE: 70 MMHG

## 2017-03-29 DIAGNOSIS — Z01.810 PRE-OPERATIVE CARDIOVASCULAR EXAMINATION: Primary | ICD-10-CM

## 2017-03-29 DIAGNOSIS — I25.10 CORONARY ARTERY DISEASE DUE TO LIPID RICH PLAQUE: ICD-10-CM

## 2017-03-29 DIAGNOSIS — I70.0 AORTIC CALCIFICATION: ICD-10-CM

## 2017-03-29 DIAGNOSIS — I15.2 HYPERTENSION ASSOCIATED WITH DIABETES: Chronic | ICD-10-CM

## 2017-03-29 DIAGNOSIS — E11.59 HYPERTENSION ASSOCIATED WITH DIABETES: Chronic | ICD-10-CM

## 2017-03-29 DIAGNOSIS — I25.83 CORONARY ARTERY DISEASE DUE TO LIPID RICH PLAQUE: ICD-10-CM

## 2017-03-29 DIAGNOSIS — I25.10 CORONARY ARTERY DISEASE, OCCLUSIVE: Chronic | ICD-10-CM

## 2017-03-29 DIAGNOSIS — E78.2 MIXED HYPERLIPIDEMIA: ICD-10-CM

## 2017-03-29 DIAGNOSIS — I25.10 CORONARY ARTERY DISEASE DUE TO LIPID RICH PLAQUE: Primary | ICD-10-CM

## 2017-03-29 DIAGNOSIS — I25.83 CORONARY ARTERY DISEASE DUE TO LIPID RICH PLAQUE: Primary | ICD-10-CM

## 2017-03-29 DIAGNOSIS — N52.9 ERECTILE DYSFUNCTION, UNSPECIFIED ERECTILE DYSFUNCTION TYPE: ICD-10-CM

## 2017-03-29 PROCEDURE — 99215 OFFICE O/P EST HI 40 MIN: CPT | Mod: S$GLB,,, | Performed by: NUCLEAR MEDICINE

## 2017-03-29 PROCEDURE — 93000 ELECTROCARDIOGRAM COMPLETE: CPT | Mod: S$GLB,,, | Performed by: INTERNAL MEDICINE

## 2017-03-29 PROCEDURE — 1157F ADVNC CARE PLAN IN RCRD: CPT | Mod: S$GLB,,, | Performed by: NUCLEAR MEDICINE

## 2017-03-29 PROCEDURE — 1125F AMNT PAIN NOTED PAIN PRSNT: CPT | Mod: S$GLB,,, | Performed by: NUCLEAR MEDICINE

## 2017-03-29 PROCEDURE — 99499 UNLISTED E&M SERVICE: CPT | Mod: S$GLB,,, | Performed by: NUCLEAR MEDICINE

## 2017-03-29 PROCEDURE — 3075F SYST BP GE 130 - 139MM HG: CPT | Mod: S$GLB,,, | Performed by: NUCLEAR MEDICINE

## 2017-03-29 PROCEDURE — 4010F ACE/ARB THERAPY RXD/TAKEN: CPT | Mod: S$GLB,,, | Performed by: NUCLEAR MEDICINE

## 2017-03-29 PROCEDURE — 1159F MED LIST DOCD IN RCRD: CPT | Mod: S$GLB,,, | Performed by: NUCLEAR MEDICINE

## 2017-03-29 PROCEDURE — 3044F HG A1C LEVEL LT 7.0%: CPT | Mod: S$GLB,,, | Performed by: NUCLEAR MEDICINE

## 2017-03-29 PROCEDURE — 3078F DIAST BP <80 MM HG: CPT | Mod: S$GLB,,, | Performed by: NUCLEAR MEDICINE

## 2017-03-29 PROCEDURE — 1160F RVW MEDS BY RX/DR IN RCRD: CPT | Mod: S$GLB,,, | Performed by: NUCLEAR MEDICINE

## 2017-03-29 PROCEDURE — 99999 PR PBB SHADOW E&M-EST. PATIENT-LVL III: CPT | Mod: PBBFAC,,, | Performed by: NUCLEAR MEDICINE

## 2017-03-29 RX ORDER — SILDENAFIL 100 MG/1
100 TABLET, FILM COATED ORAL
Qty: 6 TABLET | Refills: 11 | Status: ON HOLD | OUTPATIENT
Start: 2017-03-29 | End: 2018-09-14 | Stop reason: HOSPADM

## 2017-03-29 NOTE — MR AVS SNAPSHOT
[facility]  [address] [facility] [address]  Phone: [phone]  Fax: [phone]                  Srinath Mcknight   3/29/2017 1:20 PM   Office Visit    Description:  Male : 1940   Provider:  Elver Cobos MD   Department:  [facility]           Reason for Visit     Coronary Artery Disease     Hypertension     Pre-op Exam           Diagnoses this Visit        Comments    Pre-operative cardiovascular examination    -  Primary     Coronary artery disease, occlusive         Hypertension associated with diabetes                To Do List           Future Appointments        Provider Department Dept Phone    2017 11:00 AM Leatha Franks PA-C [facility] [phone]    2017 10:20 AM Martin Machuca MD [facility] [phone]    2018 9:30 AM Amol New OD [facility] [phone]      Your Future Surgeries/Procedures     2017   Surgery with Chauncey Ellis Jr., MD   [facility]    [address] [place] [address] [phone]              Goals (5 Years of Data)     None      Follow-Up and Disposition     Return in about 6 months (around 2017).      [facility]     [facility] Care Line - 24/ Assistance  Registered nurses in the [facility] provide clinical advisement, health education, appointment booking, and other advisory services.  Call for this free service at [phone].             Medications           Message regarding Medications     Verify the changes and/or additions to your medication regime listed below are the same as discussed with your clinician today.  If any of these changes or additions are incorrect, please notify your healthcare provider.             Verify that the below list of medications is an accurate representation of the medications you are currently taking.  If  "none reported, the list may be blank. If incorrect, please contact your healthcare provider. Carry this list with you in case of emergency.           Current Medications     aspirin (ECOTRIN) 81 MG EC tablet Take 81 mg by mouth once daily.    celecoxib (CELEBREX) 200 MG capsule Take 1 capsule (200 mg total) by mouth once daily.    clonazePAM (KLONOPIN) 0.25 MG TbDL Take 1 tablet (0.25 mg total) by mouth nightly.    docusate sodium (COLACE) 100 MG capsule Take 1 capsule (100 mg total) by mouth 2 (two) times daily as needed.    gabapentin (NEURONTIN) 100 MG capsule Take 1 capsule at night for a week then increase to 2 capsules at night thereafter. Increase as tolerated    glipiZIDE (GLUCOTROL) 5 MG tablet TAKE 2 TABLETS (10 MG) TWO TIMES DAILY BEFORE MEALS    losartan-hydrochlorothiazide 50-12.5 mg (HYZAAR) 50-12.5 mg per tablet TAKE 1 TABLET ONE TIME DAILY    melatonin 5 mg Tab Take 5 mg by mouth every evening.    metformin (GLUCOPHAGE) 1000 MG tablet TAKE 1 TABLET TWICE DAILY WITH MEALS    metoprolol succinate (TOPROL-XL) 50 MG 24 hr tablet TAKE 1 TABLET EVERY DAY    pramipexole (MIRAPEX) 0.25 MG tablet Take 1 tablet (0.25 mg total) by mouth every evening.    pravastatin (PRAVACHOL) 40 MG tablet TAKE 1 TABLET EVERY DAY    sildenafil (VIAGRA) 100 MG tablet Take 1 tablet (100 mg total) by mouth as needed. Take on an empty stomach    blood sugar diagnostic (ACCU-CHEK FRANKO PLUS TEST STRP) Strp USE  1  STRIP ONE TIME DAILY    lancets (ACCU-CHEK MULTICLIX LANCET) Misc TEST ONE TIME DAILY    oxycodone-acetaminophen (PERCOCET)  mg per tablet Take 1 tablet by mouth every 4 (four) hours as needed.           Clinical Reference Information           Your Vitals Were     BP Pulse Height Weight BMI    132/70 (BP Location: Left arm, Patient Position: Sitting, BP Method: Manual) 64 6' 2" (1.88 m) 101 kg (222 lb 10.6 oz) 28.59 kg/m2      Blood Pressure          Most Recent Value    Right Arm BP - Sitting  120/70    Right Arm " BP - Standing  130/70    BP  132/70      Allergies as of 3/29/2017     Sulfa (Sulfonamide Antibiotics)      Immunizations Administered on Date of Encounter - 3/29/2017     None      Language Assistance Services     ATTENTION: Language assistance services are available, free of charge. Please call [phone].      ATENCIÓN: Si habla alphonse, tiene a bahena disposición servicios gratuitos de asistencia lingüística. Llame al [phone].     CHÚ Ý: N?u b?n nói Ti?ng Vi?t, có các d?ch v? h? tr? ngôn ng? mi?n phí dành cho b?n. G?i s? [phone].         [facility] complies with applicable Federal civil rights laws and does not discriminate on the basis of race, color, national origin, age, disability, or sex.

## 2017-03-29 NOTE — PROGRESS NOTES
Subjective:   Patient ID:  Srinath Mcknight is a 76 y.o. male who presents for evaluation of Coronary Artery Disease; Hypertension; and Pre-op Exam (bilateral hernia surgery)      HPI REFERRED BY GENERAL SURGERY-  DR. RAMON BUCHANAN, FOR PRE OP CARD EXAM- BILATERAL INGUINAL HERNIA REPAIR  HX OF CHRONIC CAD- ANGINA FC 2  AND ESSENTIAL HTN  PRESENTLY- NO RECENT HOSPITALIZATIONS FOR ACS OR ADHF OR ARRHYTHMIAS  NO HX OF RECURRENT ANGINA OR EQUIVALENT  NO UNUSUAL DAMON. NO ORTHOPNEA OR PND  NO EDEMA. NO CALVE TENDERNESS. NO PALPITATIONS  NO SYNCOPE  NO FOCAL CNS SYMPTOMS OR SIGNS TO SUGGEST TIA OR STROKE  NO HX OF ABNORMAL BLEEDING  ECG TODAY- SR. MINOR CHRONIC NS ST T CHANGES  CARD MED - GOOD COMPLIANCE, NO SIDE EFFECTS    Review of Systems   Constitution: Negative for chills, fever, weakness, night sweats, weight gain and weight loss.   HENT: Negative for headaches and nosebleeds.    Eyes: Negative for blurred vision, double vision and visual disturbance.   Cardiovascular: Negative for chest pain, dyspnea on exertion, irregular heartbeat, leg swelling, orthopnea, palpitations, paroxysmal nocturnal dyspnea and syncope.   Respiratory: Negative for cough, hemoptysis and wheezing.    Endocrine: Negative for polydipsia and polyuria.   Hematologic/Lymphatic: Does not bruise/bleed easily.   Skin: Negative for rash.   Musculoskeletal: Negative for joint pain, joint swelling, muscle weakness and myalgias.   Gastrointestinal: Negative for abdominal pain, hematemesis, jaundice and melena.   Genitourinary: Negative for dysuria, hematuria and nocturia.   Neurological: Negative for dizziness, focal weakness and sensory change.   Psychiatric/Behavioral: Negative for depression. The patient does not have insomnia and is not nervous/anxious.          Objective:     Physical Exam   Constitutional: He is oriented to person, place, and time. He appears well-developed. No distress.   HENT:   Head: Normocephalic.   Eyes: Conjunctivae are  normal. Pupils are equal, round, and reactive to light.   Neck: Neck supple. No JVD present. No thyromegaly present.   Cardiovascular: Normal rate, regular rhythm, normal heart sounds and intact distal pulses.  Exam reveals no gallop and no friction rub.    No murmur heard.  Pulses:       Carotid pulses are 2+ on the right side, and 2+ on the left side.       Radial pulses are 2+ on the right side, and 2+ on the left side.        Femoral pulses are 2+ on the right side, and 2+ on the left side.       Popliteal pulses are 2+ on the right side, and 2+ on the left side.        Dorsalis pedis pulses are 2+ on the right side, and 2+ on the left side.        Posterior tibial pulses are 2+ on the right side, and 2+ on the left side.   Pulmonary/Chest: Breath sounds normal. He has no wheezes. He has no rales. He exhibits no tenderness.   Abdominal: Soft. Bowel sounds are normal. He exhibits no mass. There is no hepatosplenomegaly. There is no tenderness.   Musculoskeletal: He exhibits no edema or tenderness.        Cervical back: Normal.        Thoracic back: Normal.        Lumbar back: Normal.   Lymphadenopathy:     He has no cervical adenopathy.     He has no axillary adenopathy.        Right: No supraclavicular adenopathy present.        Left: No supraclavicular adenopathy present.   Neurological: He is alert and oriented to person, place, and time. He has normal strength. No sensory deficit. Gait normal.   Skin: Skin is warm. No cyanosis. No pallor. Nails show no clubbing.   Psychiatric: He has a normal mood and affect. His speech is normal and behavior is normal. Cognition and memory are normal.       Assessment:     1. Pre-operative cardiovascular examination    2. Coronary artery disease, occlusive    3. Hypertension associated with diabetes      STABLE CV STATUS- CAD /PATTERN OF ANGINA STABLE  NO CLINICAL EVIDENCE OF ADHF. NO ARRHYTHMIAS  BP WELL CONTROLLED  CNS STATUS STABLE  CARD MED WELL TOLERATED  Plan:     1-  NO CV CONTRAINDICATIONS FOR SURGERY AND ANESTHESIA.  LOW RISK FOR PERIOPERATIVE CV EVENTS  CAN HOLD ASA IF NECESSARY BY SURGEON.    2- RETURN IN 6 MONTHS.

## 2017-04-14 RX ORDER — DEXTROMETHORPHAN HYDROBROMIDE, GUAIFENESIN 5; 100 MG/5ML; MG/5ML
1300 LIQUID ORAL EVERY 8 HOURS
Status: ON HOLD | COMMUNITY
End: 2018-09-11

## 2017-04-14 NOTE — PRE-PROCEDURE INSTRUCTIONS
Preop instructions: NPO after midnight or 8 hours prior to procedure time, shower instructions, directions, leave all valuables at home, medication instructions for PM prior & am of procedure explained. Patient stated an understanding.    Patient denies any side effects or issues with anesthesia or sedation.    Patient does not know arrival time. Explained that this information comes from the surgeons office , will get time when he goes for preop on 4/17. Patient stated an understanding.    AM blood sugars

## 2017-04-17 ENCOUNTER — TELEPHONE (OUTPATIENT)
Dept: SURGERY | Facility: CLINIC | Age: 77
End: 2017-04-17

## 2017-04-17 ENCOUNTER — OFFICE VISIT (OUTPATIENT)
Dept: SURGERY | Facility: CLINIC | Age: 77
End: 2017-04-17
Payer: MEDICARE

## 2017-04-17 VITALS
WEIGHT: 220 LBS | SYSTOLIC BLOOD PRESSURE: 121 MMHG | HEIGHT: 75 IN | BODY MASS INDEX: 27.35 KG/M2 | DIASTOLIC BLOOD PRESSURE: 71 MMHG | TEMPERATURE: 99 F | HEART RATE: 72 BPM

## 2017-04-17 DIAGNOSIS — I25.10 CORONARY ARTERY DISEASE, OCCLUSIVE: Chronic | ICD-10-CM

## 2017-04-17 DIAGNOSIS — G47.33 OSA (OBSTRUCTIVE SLEEP APNEA): ICD-10-CM

## 2017-04-17 DIAGNOSIS — K40.20 BILATERAL INGUINAL HERNIA WITHOUT OBSTRUCTION OR GANGRENE, RECURRENCE NOT SPECIFIED: Primary | ICD-10-CM

## 2017-04-17 DIAGNOSIS — E11.59 HYPERTENSION ASSOCIATED WITH DIABETES: Chronic | ICD-10-CM

## 2017-04-17 DIAGNOSIS — I15.2 HYPERTENSION ASSOCIATED WITH DIABETES: Chronic | ICD-10-CM

## 2017-04-17 PROCEDURE — 3078F DIAST BP <80 MM HG: CPT | Mod: S$GLB,,, | Performed by: SURGERY

## 2017-04-17 PROCEDURE — 1160F RVW MEDS BY RX/DR IN RCRD: CPT | Mod: S$GLB,,, | Performed by: SURGERY

## 2017-04-17 PROCEDURE — 99999 PR PBB SHADOW E&M-EST. PATIENT-LVL III: CPT | Mod: PBBFAC,,, | Performed by: SURGERY

## 2017-04-17 PROCEDURE — 3074F SYST BP LT 130 MM HG: CPT | Mod: S$GLB,,, | Performed by: SURGERY

## 2017-04-17 PROCEDURE — 1159F MED LIST DOCD IN RCRD: CPT | Mod: S$GLB,,, | Performed by: SURGERY

## 2017-04-17 PROCEDURE — 1126F AMNT PAIN NOTED NONE PRSNT: CPT | Mod: S$GLB,,, | Performed by: SURGERY

## 2017-04-17 PROCEDURE — 4010F ACE/ARB THERAPY RXD/TAKEN: CPT | Mod: S$GLB,,, | Performed by: SURGERY

## 2017-04-17 PROCEDURE — 99213 OFFICE O/P EST LOW 20 MIN: CPT | Mod: 57,S$GLB,, | Performed by: SURGERY

## 2017-04-17 PROCEDURE — 3044F HG A1C LEVEL LT 7.0%: CPT | Mod: S$GLB,,, | Performed by: SURGERY

## 2017-04-17 RX ORDER — LIDOCAINE HYDROCHLORIDE 10 MG/ML
1 INJECTION, SOLUTION EPIDURAL; INFILTRATION; INTRACAUDAL; PERINEURAL ONCE
Status: CANCELLED | OUTPATIENT
Start: 2017-04-17 | End: 2017-04-17

## 2017-04-17 NOTE — PROGRESS NOTES
History & Physical    SUBJECTIVE:     History of Present Illness:  Patient is a 76 y.o. male presents with bilateral inguinal hernias.  Originally seen in Jan.  Now scheduling surgery.  No sig changes.  Some mild increase in discomfort.  No sig bulge.    Chief Complaint   Patient presents with    Pre-op Exam       Review of patient's allergies indicates:   Allergen Reactions    Sulfa (sulfonamide antibiotics)      Can't recall from 1995       Current Outpatient Prescriptions   Medication Sig Dispense Refill    acetaminophen (TYLENOL) 650 MG TbSR Take 1,300 mg by mouth every 8 (eight) hours.      aspirin (ECOTRIN) 81 MG EC tablet Take 81 mg by mouth once daily.      blood sugar diagnostic (ACCU-CHEK FRANKO PLUS TEST STRP) Strp USE  1  STRIP ONE TIME DAILY 100 strip 4    celecoxib (CELEBREX) 200 MG capsule Take 1 capsule (200 mg total) by mouth once daily. 90 capsule 3    clonazePAM (KLONOPIN) 0.25 MG TbDL Take 1 tablet (0.25 mg total) by mouth nightly. (Patient taking differently: Take 0.5 mg by mouth nightly. ) 90 tablet 1    docusate sodium (COLACE) 100 MG capsule Take 1 capsule (100 mg total) by mouth 2 (two) times daily as needed. 60 capsule 0    gabapentin (NEURONTIN) 100 MG capsule Take 1 capsule at night for a week then increase to 2 capsules at night thereafter. Increase as tolerated 60 capsule 1    glipiZIDE (GLUCOTROL) 5 MG tablet TAKE 2 TABLETS (10 MG) TWO TIMES DAILY BEFORE MEALS 360 tablet 4    lancets (ACCU-CHEK MULTICLIX LANCET) Misc TEST ONE TIME DAILY 100 each 3    losartan-hydrochlorothiazide 50-12.5 mg (HYZAAR) 50-12.5 mg per tablet TAKE 1 TABLET ONE TIME DAILY 90 tablet 4    melatonin 5 mg Tab Take 5 mg by mouth every evening.      metformin (GLUCOPHAGE) 1000 MG tablet TAKE 1 TABLET TWICE DAILY WITH MEALS 180 tablet 4    metoprolol succinate (TOPROL-XL) 50 MG 24 hr tablet TAKE 1 TABLET EVERY DAY 90 tablet 3    oxycodone-acetaminophen (PERCOCET)  mg per tablet Take 1 tablet by  mouth every 4 (four) hours as needed. 90 tablet 0    pramipexole (MIRAPEX) 0.25 MG tablet Take 1 tablet (0.25 mg total) by mouth every evening. 30 tablet 5    pravastatin (PRAVACHOL) 40 MG tablet TAKE 1 TABLET EVERY DAY 90 tablet 4    sildenafil (VIAGRA) 100 MG tablet Take 1 tablet (100 mg total) by mouth as needed. Take on an empty stomach 6 tablet 11     No current facility-administered medications for this visit.        Past Medical History:   Diagnosis Date    Anemia due to unknown mechanism 11/10/2015    Angina pectoris 2014    not since    Arthritis     Back pain     Coronary artery disease     Diabetes mellitus 20 years     am 09/29/2014    Diabetes mellitus type II 1994     am 12/22/2015    DM (diabetes mellitus) 1994    BS 80 am 01/10/2017    Hyperlipemia     Hypertension     CHARLES (obstructive sleep apnea)     Spinal cord disease     Trouble in sleeping      Past Surgical History:   Procedure Laterality Date    LAMINECTOMY  07/22/2016    ROTATOR CUFF REPAIR Left 2006    SHOULDER ARTHROSCOPY W/ ROTATOR CUFF REPAIR Right 2009     Family History   Problem Relation Age of Onset    Hypertension Mother     Heart disease Mother     Diabetes Mother     Hypertension Father     Heart disease Father     Diabetes Father     Stroke Father     Diabetes Sister     Cancer Brother 50     pancreas    Drug abuse Brother     Prostate cancer Neg Hx     Macular degeneration Neg Hx     Retinal detachment Neg Hx     Strabismus Neg Hx     Glaucoma Neg Hx     Blindness Neg Hx     Amblyopia Neg Hx     Kidney disease Neg Hx     Mental illness Neg Hx     Mental retardation Neg Hx     COPD Neg Hx     Asthma Neg Hx      Social History   Substance Use Topics    Smoking status: Never Smoker    Smokeless tobacco: Never Used    Alcohol use 0.6 oz/week     1 Glasses of wine per week      Comment: every 2-3 weeks        Review of Systems:  Review of Systems   Constitutional: Negative for  "activity change, appetite change, chills, diaphoresis, fatigue and fever.   HENT: Negative for congestion, sinus pressure, sneezing, sore throat and tinnitus.    Eyes: Negative for pain, discharge, redness, itching and visual disturbance.   Respiratory: Negative for apnea, cough, choking, chest tightness and shortness of breath.    Cardiovascular: Negative for chest pain, palpitations and leg swelling.   Gastrointestinal: Negative for abdominal distention, abdominal pain, blood in stool, constipation, diarrhea and nausea.   Musculoskeletal: Negative for arthralgias, back pain and gait problem.        L>R gron and upper leg pain   Neurological: Negative for dizziness, facial asymmetry, light-headedness and headaches.   Psychiatric/Behavioral: Negative for agitation, behavioral problems and confusion.       OBJECTIVE:     Vital Signs (Most Recent)  Temp: 98.6 °F (37 °C) (04/17/17 1522)  Pulse: 72 (04/17/17 1522)  BP: 121/71 (04/17/17 1522)  6' 3" (1.905 m)  99.8 kg (220 lb)     Physical Exam:  Physical Exam   Constitutional: He is oriented to person, place, and time. He appears well-developed and well-nourished. No distress.   HENT:   Head: Normocephalic and atraumatic.   Right Ear: External ear normal.   Left Ear: External ear normal.   Eyes: EOM are normal. Right eye exhibits no discharge. Left eye exhibits no discharge. No scleral icterus.   Neck: Neck supple. No thyromegaly present.   Cardiovascular: Normal rate, regular rhythm and normal heart sounds.  Exam reveals no gallop and no friction rub.    No murmur heard.  Pulmonary/Chest: Effort normal and breath sounds normal. No respiratory distress. He has no wheezes. He has no rales.   Abdominal: Soft. Bowel sounds are normal. He exhibits no distension and no mass. There is no tenderness. There is no rebound and no guarding. No hernia.   Musculoskeletal: Normal range of motion.   Neurological: He is alert and oriented to person, place, and time.   Skin: Skin is " warm and dry. No rash noted. He is not diaphoretic. No erythema. No pallor.   Psychiatric: He has a normal mood and affect. His behavior is normal. Judgment and thought content normal.         ASSESSMENT/PLAN:     b inguinal hernia    PLAN:Plan     To OR for lap repiar  Consent obtained  Cleared by cards.

## 2017-04-18 ENCOUNTER — ANESTHESIA (OUTPATIENT)
Dept: SURGERY | Facility: HOSPITAL | Age: 77
End: 2017-04-18
Payer: MEDICARE

## 2017-04-18 ENCOUNTER — ANESTHESIA EVENT (OUTPATIENT)
Dept: SURGERY | Facility: HOSPITAL | Age: 77
End: 2017-04-18
Payer: MEDICARE

## 2017-04-18 ENCOUNTER — HOSPITAL ENCOUNTER (OUTPATIENT)
Facility: HOSPITAL | Age: 77
Discharge: HOME | End: 2017-04-18
Attending: SURGERY | Admitting: SURGERY
Payer: MEDICARE

## 2017-04-18 VITALS
BODY MASS INDEX: 27.35 KG/M2 | SYSTOLIC BLOOD PRESSURE: 137 MMHG | TEMPERATURE: 98 F | HEART RATE: 68 BPM | OXYGEN SATURATION: 98 % | RESPIRATION RATE: 18 BRPM | WEIGHT: 220 LBS | DIASTOLIC BLOOD PRESSURE: 73 MMHG | HEIGHT: 75 IN

## 2017-04-18 DIAGNOSIS — K40.20 NON-RECURRENT BILATERAL INGUINAL HERNIA WITHOUT OBSTRUCTION OR GANGRENE: Primary | ICD-10-CM

## 2017-04-18 DIAGNOSIS — E11.59 HYPERTENSION ASSOCIATED WITH DIABETES: Chronic | ICD-10-CM

## 2017-04-18 DIAGNOSIS — K40.20 BILATERAL INGUINAL HERNIA WITHOUT OBSTRUCTION OR GANGRENE, RECURRENCE NOT SPECIFIED: ICD-10-CM

## 2017-04-18 DIAGNOSIS — I25.10 CORONARY ARTERY DISEASE, OCCLUSIVE: Chronic | ICD-10-CM

## 2017-04-18 DIAGNOSIS — I15.2 HYPERTENSION ASSOCIATED WITH DIABETES: Chronic | ICD-10-CM

## 2017-04-18 DIAGNOSIS — G47.33 OSA (OBSTRUCTIVE SLEEP APNEA): ICD-10-CM

## 2017-04-18 LAB
POCT GLUCOSE: 142 MG/DL (ref 70–110)
POCT GLUCOSE: 149 MG/DL (ref 70–110)

## 2017-04-18 PROCEDURE — 49650 LAP ING HERNIA REPAIR INIT: CPT | Mod: 50,,, | Performed by: SURGERY

## 2017-04-18 PROCEDURE — 27201423 OPTIME MED/SURG SUP & DEVICES STERILE SUPPLY: Performed by: SURGERY

## 2017-04-18 PROCEDURE — 25000003 PHARM REV CODE 250: Performed by: SURGERY

## 2017-04-18 PROCEDURE — 63600175 PHARM REV CODE 636 W HCPCS: Performed by: NURSE ANESTHETIST, CERTIFIED REGISTERED

## 2017-04-18 PROCEDURE — 36000709 HC OR TIME LEV III EA ADD 15 MIN: Performed by: SURGERY

## 2017-04-18 PROCEDURE — 27000221 HC OXYGEN, UP TO 24 HOURS

## 2017-04-18 PROCEDURE — D9220A PRA ANESTHESIA: Mod: ANES,,, | Performed by: ANESTHESIOLOGY

## 2017-04-18 PROCEDURE — 94760 N-INVAS EAR/PLS OXIMETRY 1: CPT

## 2017-04-18 PROCEDURE — 63600175 PHARM REV CODE 636 W HCPCS: Performed by: SURGERY

## 2017-04-18 PROCEDURE — 71000015 HC POSTOP RECOV 1ST HR: Performed by: SURGERY

## 2017-04-18 PROCEDURE — 36000708 HC OR TIME LEV III 1ST 15 MIN: Performed by: SURGERY

## 2017-04-18 PROCEDURE — 37000008 HC ANESTHESIA 1ST 15 MINUTES: Performed by: SURGERY

## 2017-04-18 PROCEDURE — 25000003 PHARM REV CODE 250: Performed by: NURSE ANESTHETIST, CERTIFIED REGISTERED

## 2017-04-18 PROCEDURE — 25000003 PHARM REV CODE 250: Performed by: STUDENT IN AN ORGANIZED HEALTH CARE EDUCATION/TRAINING PROGRAM

## 2017-04-18 PROCEDURE — 71000016 HC POSTOP RECOV ADDL HR: Performed by: SURGERY

## 2017-04-18 PROCEDURE — D9220A PRA ANESTHESIA: Mod: CRNA,,, | Performed by: NURSE ANESTHETIST, CERTIFIED REGISTERED

## 2017-04-18 PROCEDURE — C1781 MESH (IMPLANTABLE): HCPCS | Performed by: SURGERY

## 2017-04-18 PROCEDURE — 71000033 HC RECOVERY, INTIAL HOUR: Performed by: SURGERY

## 2017-04-18 PROCEDURE — 37000009 HC ANESTHESIA EA ADD 15 MINS: Performed by: SURGERY

## 2017-04-18 DEVICE — MESH 3DMAX LT L LG 10.3X15.7CM: Type: IMPLANTABLE DEVICE | Site: INGUINAL | Status: FUNCTIONAL

## 2017-04-18 DEVICE — MESH 3DMAX LIGHT LARGE RIGHT: Type: IMPLANTABLE DEVICE | Site: INGUINAL | Status: FUNCTIONAL

## 2017-04-18 RX ORDER — BUPIVACAINE HYDROCHLORIDE AND EPINEPHRINE 2.5; 5 MG/ML; UG/ML
INJECTION, SOLUTION EPIDURAL; INFILTRATION; INTRACAUDAL; PERINEURAL
Status: DISCONTINUED | OUTPATIENT
Start: 2017-04-18 | End: 2017-04-18 | Stop reason: HOSPADM

## 2017-04-18 RX ORDER — SENNOSIDES 8.6 MG/1
1 TABLET ORAL DAILY
Qty: 60 TABLET | Refills: 0 | Status: SHIPPED | OUTPATIENT
Start: 2017-04-18 | End: 2017-12-12 | Stop reason: ALTCHOICE

## 2017-04-18 RX ORDER — NEOSTIGMINE METHYLSULFATE 1 MG/ML
INJECTION, SOLUTION INTRAVENOUS
Status: DISCONTINUED | OUTPATIENT
Start: 2017-04-18 | End: 2017-04-18

## 2017-04-18 RX ORDER — SUCCINYLCHOLINE CHLORIDE 20 MG/ML
INJECTION INTRAMUSCULAR; INTRAVENOUS
Status: DISCONTINUED | OUTPATIENT
Start: 2017-04-18 | End: 2017-04-18

## 2017-04-18 RX ORDER — LIDOCAINE HYDROCHLORIDE 10 MG/ML
1 INJECTION, SOLUTION EPIDURAL; INFILTRATION; INTRACAUDAL; PERINEURAL ONCE
Status: COMPLETED | OUTPATIENT
Start: 2017-04-18 | End: 2017-04-18

## 2017-04-18 RX ORDER — ACETAMINOPHEN 10 MG/ML
INJECTION, SOLUTION INTRAVENOUS
Status: DISCONTINUED | OUTPATIENT
Start: 2017-04-18 | End: 2017-04-18

## 2017-04-18 RX ORDER — OXYCODONE AND ACETAMINOPHEN 5; 325 MG/1; MG/1
1 TABLET ORAL EVERY 4 HOURS PRN
Qty: 65 TABLET | Refills: 0 | Status: ON HOLD | OUTPATIENT
Start: 2017-04-18 | End: 2017-10-27 | Stop reason: HOSPADM

## 2017-04-18 RX ORDER — LIDOCAINE HYDROCHLORIDE 10 MG/ML
INJECTION, SOLUTION EPIDURAL; INFILTRATION; INTRACAUDAL; PERINEURAL
Status: DISCONTINUED | OUTPATIENT
Start: 2017-04-18 | End: 2017-04-18 | Stop reason: HOSPADM

## 2017-04-18 RX ORDER — PHENYLEPHRINE HYDROCHLORIDE 10 MG/ML
INJECTION INTRAVENOUS
Status: DISCONTINUED | OUTPATIENT
Start: 2017-04-18 | End: 2017-04-18

## 2017-04-18 RX ORDER — LIDOCAINE HCL/PF 100 MG/5ML
SYRINGE (ML) INTRAVENOUS
Status: DISCONTINUED | OUTPATIENT
Start: 2017-04-18 | End: 2017-04-18

## 2017-04-18 RX ORDER — ONDANSETRON 2 MG/ML
INJECTION INTRAMUSCULAR; INTRAVENOUS
Status: DISCONTINUED | OUTPATIENT
Start: 2017-04-18 | End: 2017-04-18

## 2017-04-18 RX ORDER — CEFAZOLIN SODIUM 2 G/50ML
2 SOLUTION INTRAVENOUS
Status: COMPLETED | OUTPATIENT
Start: 2017-04-18 | End: 2017-04-18

## 2017-04-18 RX ORDER — FENTANYL CITRATE 50 UG/ML
INJECTION, SOLUTION INTRAMUSCULAR; INTRAVENOUS
Status: DISCONTINUED | OUTPATIENT
Start: 2017-04-18 | End: 2017-04-18

## 2017-04-18 RX ORDER — HYDROMORPHONE HYDROCHLORIDE 1 MG/ML
0.2 INJECTION, SOLUTION INTRAMUSCULAR; INTRAVENOUS; SUBCUTANEOUS EVERY 5 MIN PRN
Status: DISCONTINUED | OUTPATIENT
Start: 2017-04-18 | End: 2017-04-18 | Stop reason: HOSPADM

## 2017-04-18 RX ORDER — DEXAMETHASONE SODIUM PHOSPHATE 4 MG/ML
INJECTION, SOLUTION INTRA-ARTICULAR; INTRALESIONAL; INTRAMUSCULAR; INTRAVENOUS; SOFT TISSUE
Status: DISCONTINUED | OUTPATIENT
Start: 2017-04-18 | End: 2017-04-18

## 2017-04-18 RX ORDER — PROPOFOL 10 MG/ML
VIAL (ML) INTRAVENOUS
Status: DISCONTINUED | OUTPATIENT
Start: 2017-04-18 | End: 2017-04-18

## 2017-04-18 RX ORDER — ROCURONIUM BROMIDE 10 MG/ML
INJECTION, SOLUTION INTRAVENOUS
Status: DISCONTINUED | OUTPATIENT
Start: 2017-04-18 | End: 2017-04-18

## 2017-04-18 RX ORDER — GLYCOPYRROLATE 0.2 MG/ML
INJECTION INTRAMUSCULAR; INTRAVENOUS
Status: DISCONTINUED | OUTPATIENT
Start: 2017-04-18 | End: 2017-04-18

## 2017-04-18 RX ORDER — SODIUM CHLORIDE 0.9 % (FLUSH) 0.9 %
3 SYRINGE (ML) INJECTION
Status: DISCONTINUED | OUTPATIENT
Start: 2017-04-18 | End: 2017-04-18 | Stop reason: HOSPADM

## 2017-04-18 RX ORDER — SODIUM CHLORIDE 9 MG/ML
INJECTION, SOLUTION INTRAVENOUS CONTINUOUS PRN
Status: DISCONTINUED | OUTPATIENT
Start: 2017-04-18 | End: 2017-04-18

## 2017-04-18 RX ORDER — BACITRACIN 50000 [IU]/1
INJECTION, POWDER, FOR SOLUTION INTRAMUSCULAR
Status: DISCONTINUED | OUTPATIENT
Start: 2017-04-18 | End: 2017-04-18 | Stop reason: HOSPADM

## 2017-04-18 RX ORDER — OXYCODONE AND ACETAMINOPHEN 5; 325 MG/1; MG/1
1 TABLET ORAL
Status: DISCONTINUED | OUTPATIENT
Start: 2017-04-18 | End: 2017-04-18 | Stop reason: HOSPADM

## 2017-04-18 RX ORDER — MIDAZOLAM HYDROCHLORIDE 1 MG/ML
INJECTION, SOLUTION INTRAMUSCULAR; INTRAVENOUS
Status: DISCONTINUED | OUTPATIENT
Start: 2017-04-18 | End: 2017-04-18

## 2017-04-18 RX ADMIN — LIDOCAINE HYDROCHLORIDE 60 MG: 20 INJECTION, SOLUTION INTRAVENOUS at 12:04

## 2017-04-18 RX ADMIN — ROCURONIUM BROMIDE 5 MG: 10 INJECTION, SOLUTION INTRAVENOUS at 12:04

## 2017-04-18 RX ADMIN — CEFAZOLIN SODIUM 2 G: 2 SOLUTION INTRAVENOUS at 01:04

## 2017-04-18 RX ADMIN — FENTANYL CITRATE 50 MCG: 50 INJECTION, SOLUTION INTRAMUSCULAR; INTRAVENOUS at 01:04

## 2017-04-18 RX ADMIN — SUCCINYLCHOLINE CHLORIDE 140 MG: 20 INJECTION, SOLUTION INTRAMUSCULAR; INTRAVENOUS at 12:04

## 2017-04-18 RX ADMIN — FENTANYL CITRATE 100 MCG: 50 INJECTION, SOLUTION INTRAMUSCULAR; INTRAVENOUS at 12:04

## 2017-04-18 RX ADMIN — PHENYLEPHRINE HYDROCHLORIDE 100 MCG: 10 INJECTION INTRAVENOUS at 01:04

## 2017-04-18 RX ADMIN — ROCURONIUM BROMIDE 25 MG: 10 INJECTION, SOLUTION INTRAVENOUS at 01:04

## 2017-04-18 RX ADMIN — DEXAMETHASONE SODIUM PHOSPHATE 4 MG: 4 INJECTION, SOLUTION INTRAMUSCULAR; INTRAVENOUS at 01:04

## 2017-04-18 RX ADMIN — ACETAMINOPHEN 1000 MG: 10 INJECTION, SOLUTION INTRAVENOUS at 01:04

## 2017-04-18 RX ADMIN — OXYCODONE HYDROCHLORIDE AND ACETAMINOPHEN 1 TABLET: 5; 325 TABLET ORAL at 02:04

## 2017-04-18 RX ADMIN — LIDOCAINE HYDROCHLORIDE 10 MG: 10 INJECTION, SOLUTION EPIDURAL; INFILTRATION; INTRACAUDAL; PERINEURAL at 10:04

## 2017-04-18 RX ADMIN — NEOSTIGMINE METHYLSULFATE 3 MG: 1 INJECTION INTRAVENOUS at 02:04

## 2017-04-18 RX ADMIN — GLYCOPYRROLATE 0.4 MG: 0.2 INJECTION, SOLUTION INTRAMUSCULAR; INTRAVENOUS at 02:04

## 2017-04-18 RX ADMIN — ONDANSETRON 4 MG: 2 INJECTION INTRAMUSCULAR; INTRAVENOUS at 02:04

## 2017-04-18 RX ADMIN — SODIUM CHLORIDE: 0.9 INJECTION, SOLUTION INTRAVENOUS at 12:04

## 2017-04-18 RX ADMIN — MIDAZOLAM HYDROCHLORIDE 2 MG: 1 INJECTION, SOLUTION INTRAMUSCULAR; INTRAVENOUS at 12:04

## 2017-04-18 RX ADMIN — PROPOFOL 120 MG: 10 INJECTION, EMULSION INTRAVENOUS at 12:04

## 2017-04-18 NOTE — ANESTHESIA PREPROCEDURE EVALUATION
04/18/2017  Srinath Mcknight is a 76 y.o., male here for hernia repair. Hx of CAD (stable angina, followed by cardiologist), HTN, DM type 2, CHARLES    Anesthesia Evaluation    I have reviewed the Patient Summary Reports.    I have reviewed the Nursing Notes.   I have reviewed the Medications.     Review of Systems  Cardiovascular:   Hypertension CAD   Angina    Pulmonary:   Sleep Apnea    Hepatic/GI:  Hepatic/GI Normal    Musculoskeletal:   Arthritis     Neurological:  Neurology Normal    Endocrine:   Diabetes        Physical Exam  General:  Well nourished    Airway/Jaw/Neck:  Airway Findings: Mouth Opening: Normal Tongue: Normal  General Airway Assessment: Adult  Mallampati: II  TM Distance: Normal, at least 6 cm       Chest/Lungs:  Chest/Lungs Findings: Clear to auscultation, Normal Respiratory Rate     Heart/Vascular:  Heart Findings: Rate: Normal  Rhythm: Regular Rhythm             Anesthesia Plan  Type of Anesthesia, risks & benefits discussed:  Anesthesia Type:  general  Patient's Preference:   Intra-op Monitoring Plan:   Intra-op Monitoring Plan Comments:   Post Op Pain Control Plan:   Post Op Pain Control Plan Comments:   Induction:   IV  Beta Blocker:  Patient is not currently on a Beta-Blocker (No further documentation required).       Informed Consent: Patient understands risks and agrees with Anesthesia plan.  Questions answered. Anesthesia consent signed with patient.  ASA Score: 3     Day of Surgery Review of History & Physical:            Ready For Surgery From Anesthesia Perspective.     Patient Active Problem List   Diagnosis    Combined hyperlipidemia associated with type 2 diabetes mellitus    REM behavioral disorder    Coronary artery disease, occlusive    Hypertension associated with diabetes    Osteoarthritis of spine with radiculopathy, lumbar region    Osteoarthritis of hip     Aortic calcification    Anemia due to unknown mechanism    Pre-operative cardiovascular examination    Type 2 diabetes mellitus without complication, without long-term current use of insulin    PLMD (periodic limb movement disorder)    CHARLES (obstructive sleep apnea)    Bilateral inguinal hernia without obstruction or gangrene

## 2017-04-18 NOTE — OP NOTE
DATE OF PROCEDURE: 04/18/2017  SERVICE: General Surgery.   Surgeon(s) and Role:     * Brooke Vargas MD - Resident - Assisting     * Chauncey Ellis Jr., MD - Primary  PREOPERATIVE DIAGNOSIS: Bilateral inguinal hernia.   POSTOPERATIVE DIAGNOSIS: Bilateral inguinal hernias.   PROCEDURE: Laparoscopic repair of bilateral inguinal hernias with mesh.  ANESTHESIA: General endotracheal and local.   DESCRIPTION OF PROCEDURE: The patient was taken to the Operating Room, placed   under general anesthesia, and prepped and draped in sterile fashion. At this   time, an infraumbilical incision was made after infiltrating with local   anesthetic. This was carried down to the linea alba with electrocautery and   blunt dissection. An incision was made in the anterior fascia, just to the right  of the linea alba. At this time, the rectus muscle was then swept laterally   and elevated, and a dissecting balloon trocar was placed in the retrorectus   space. At this time, under direct visualization, the balloon was insufflated,   creating the retrorectus space without difficulty. The trocar was then inserted   into this retrorectus space, and the balloon was insufflated and the dissecting   balloon was removed. Once this was complete, further ports were placed in the   midline, 5 mm in nature, one 1 fingerbreadth above the pubic symphysis and one   between the suprapubic port and the infraumbilical port. These were placed   under direct visualization, after infiltrating with local anesthetic. Once the   ports were placed, we turned our attention to the left side.  We swept the peritoneum   down laterally, beginning at the anterior superior iliac spine and continuing this dissection medially   towards the cord. The cord was elevated. There was no  indirect hernia present. The peritoneum was  from the cord structures and   reduced back to the level of the umbilicus. The cord itself was skeletonized   and inspected, no  signs of any other abnormalities.  At this time, we continued dissection   medially towards the direct space. There was a small direct hernia   Present, this was reduced and we cleared Arsen's ligament medially as well. Once we had completed   dissection on the left side, we turned our attention to the right side.  We again swept the peritoneum down laterally, beginning at the anterior superior iliac spine and continuing this dissection medially towards the cord. The cord was elevated. There was no indirect hernia present. The peritoneum was  from the cord structures and reduced back to the level of the umbilicus. The cord itself was skeletonized   and inspected, no signs of any other abnormalities.  There was a small rent in the peritoneum, this was closed with clips.   At this time, we continued dissection   medially towards the direct space. There was a small direct hernia   present, and we cleared Arsen's ligament medially as well. we   proceeded with placement of mesh bilaterally. A piece of large 3DMAX mesh was   placed into the retrorectus space on both sides. Medially, it was brought   to midline and anteriorly to the first port. They were then tacked with two tacks   to Arsen's ligament medially, one to the rectus muscle anteriorly and then two   tacks laterally above the ASIS. At this time, the mesh was inspected, and it   was in very good position covering all defects. The sac had been dissected back   to the umbilicus and was well out of the way of the mesh repair. At this time   under direct visualization, the air was evacuated from the retrorectus space.   The ports were then removed. The retrorectus space was then infiltrated with   further 20 mL of local anesthetic and at this time, the infraumbilical port was   removed. The anterior fascia was closed with 0 Vicryl suture in figure-of-eight   fashion, closing the fascia of this incision; and the port sites were closed   with 4-0 Monocryl  in subcuticular fashion. Mastisol and Steri-Strips were then   placed. The patient was allowed to awake from general anesthesia and   transferred to bed for transport to Recovery.   COMPLICATIONS: None.   SPONGE COUNT: Correct.   BLOOD LOSS: 15 mL.   FLUIDS: Per Anesthesia.   BLOOD GIVEN: None.   DRAINS: None.   SPECIMENS: None.   CONDITION OF PATIENT: Good.   I was present for the entire procedure.

## 2017-04-18 NOTE — TRANSFER OF CARE
"Anesthesia Transfer of Care Note    Patient: Srinath Mcknight    Procedure(s) Performed: Procedure(s) (LRB):  REPAIR-HERNIA-INGUINAL-BILATERAL-LAPAROSCOPIC w/ mesh (Bilateral)    Patient location: PACU    Anesthesia Type: general    Transport from OR: Transported from OR on 6-10 L/min O2 by face mask with adequate spontaneous ventilation    Post pain: adequate analgesia    Post assessment: no apparent anesthetic complications and tolerated procedure well    Post vital signs: stable    Level of consciousness: awake    Nausea/Vomiting: no nausea/vomiting    Complications: none          Last vitals:   Visit Vitals    BP (!) 141/70 (BP Location: Left arm, Patient Position: Lying, BP Method: Automatic)    Pulse (!) 56    Temp 36.6 °C (97.8 °F) (Oral)    Resp 18    Ht 6' 3" (1.905 m)    Wt 99.8 kg (220 lb)    SpO2 100%    BMI 27.5 kg/m2     "

## 2017-04-18 NOTE — BRIEF OP NOTE
[facility]  Brief Operative Note     SUMMARY     Surgery Date: 4/18/2017     Surgeon(s) and Role:     * Brooke Vargas MD - Resident - Assisting     * Chauncey Ellis Jr., MD - Primary        Pre-op Diagnosis:  Bilateral inguinal hernia without obstruction or gangrene, recurrence not specified [K40.20]    Post-op Diagnosis:  Post-Op Diagnosis Codes:     * Bilateral inguinal hernia without obstruction or gangrene, recurrence not specified [K40.20]    Procedure(s) (LRB):  REPAIR-HERNIA-INGUINAL-BILATERAL-LAPAROSCOPIC w/ mesh (Bilateral)    Anesthesia: General    Description of the findings of the procedure:    Bilateral reducible direct inguinal hernias    Findings/Key Components: See Op note    Estimated Blood Loss: * No values recorded between 4/18/2017  1:19 PM and 4/18/2017  2:27 PM *         Specimens:   Specimen     None          Discharge Note    SUMMARY     Admit Date: 4/18/2017    Discharge Date and Time:  04/18/2017 2:33 PM    Hospital Course (synopsis of major diagnoses, care, treatment, and services provided during the course of the hospital stay):     Patient underwent laparoscopic bilateral inguinal hernia repair with mesh tolerated the procedure well and was discharged the same day with instructions to follow up in two weeks.     Final Diagnosis: Post-Op Diagnosis Codes:     * Bilateral inguinal hernia without obstruction or gangrene, recurrence not specified [K40.20]    Disposition: Home or Self Care    Follow Up/Patient Instructions:     Medications:  Reconciled Home Medications:   Current Discharge Medication List      START taking these medications    Details   oxycodone-acetaminophen (PERCOCET) 5-325 mg per tablet Take 1 tablet by mouth every 4 (four) hours as needed for Pain.  Qty: 65 tablet, Refills: 0      senna (SENNA CONCENTRATE) 8.6 mg tablet Take 1 tablet by mouth once daily.  Qty: 60 tablet, Refills: 0         CONTINUE these medications which have NOT  CHANGED    Details   acetaminophen (TYLENOL) 650 MG TbSR Take 1,300 mg by mouth every 8 (eight) hours.      clonazePAM (KLONOPIN) 0.25 MG TbDL Take 1 tablet (0.25 mg total) by mouth nightly.  Qty: 90 tablet, Refills: 1    Comments: Hold until pt calls to fill  Associated Diagnoses: Parasomnia      gabapentin (NEURONTIN) 100 MG capsule Take 1 capsule at night for a week then increase to 2 capsules at night thereafter. Increase as tolerated  Qty: 60 capsule, Refills: 1      glipiZIDE (GLUCOTROL) 5 MG tablet TAKE 2 TABLETS (10 MG) TWO TIMES DAILY BEFORE MEALS  Qty: 360 tablet, Refills: 4      losartan-hydrochlorothiazide 50-12.5 mg (HYZAAR) 50-12.5 mg per tablet TAKE 1 TABLET ONE TIME DAILY  Qty: 90 tablet, Refills: 4      melatonin 5 mg Tab Take 5 mg by mouth every evening.      metformin (GLUCOPHAGE) 1000 MG tablet TAKE 1 TABLET TWICE DAILY WITH MEALS  Qty: 180 tablet, Refills: 4      metoprolol succinate (TOPROL-XL) 50 MG 24 hr tablet TAKE 1 TABLET EVERY DAY  Qty: 90 tablet, Refills: 3      oxycodone-acetaminophen (PERCOCET)  mg per tablet Take 1 tablet by mouth every 4 (four) hours as needed.  Qty: 90 tablet, Refills: 0      pramipexole (MIRAPEX) 0.25 MG tablet Take 1 tablet (0.25 mg total) by mouth every evening.  Qty: 30 tablet, Refills: 5    Associated Diagnoses: PLMD (periodic limb movement disorder)      pravastatin (PRAVACHOL) 40 MG tablet TAKE 1 TABLET EVERY DAY  Qty: 90 tablet, Refills: 4      sildenafil (VIAGRA) 100 MG tablet Take 1 tablet (100 mg total) by mouth as needed. Take on an empty stomach  Qty: 6 tablet, Refills: 11    Associated Diagnoses: Erectile dysfunction, unspecified erectile dysfunction type      aspirin (ECOTRIN) 81 MG EC tablet Take 81 mg by mouth once daily.      blood sugar diagnostic (ACCU-CHEK FRANKO PLUS TEST STRP) Strp USE  1  STRIP ONE TIME DAILY  Qty: 100 strip, Refills: 4      celecoxib (CELEBREX) 200 MG capsule Take 1 capsule (200 mg total) by mouth once daily.  Qty: 90  capsule, Refills: 3    Associated Diagnoses: Lumbar spine pain      docusate sodium (COLACE) 100 MG capsule Take 1 capsule (100 mg total) by mouth 2 (two) times daily as needed.  Qty: 60 capsule, Refills: 0      lancets (ACCU-CHEK MULTICLIX LANCET) Misc TEST ONE TIME DAILY  Qty: 100 each, Refills: 3             Discharge Procedure Orders  Diet general     Call MD for:  temperature >100.4     Call MD for:  persistent nausea and vomiting     Call MD for:  severe uncontrolled pain     Ice to affected area     Lifting restrictions     Call MD for:  difficulty breathing, headache or visual disturbances     Call MD for:  redness, tenderness, or signs of infection (pain, swelling, redness, odor or green/yellow discharge around incision site)     Call MD for:  hives     Call MD for:  persistent dizziness or light-headedness     Remove dressing in 24 hours       Follow-up Information     Follow up with Chauncey Ellis Jr, MD. Schedule an appointment as soon as possible for a visit in 2 weeks.    Specialties:  General Surgery, Bariatrics    Why:  For wound re-check    Contact information:    Marni Krishna  [facility] [address]  [phone]

## 2017-04-18 NOTE — DISCHARGE INSTRUCTIONS
"Discharge Instructions From Dr. Ellis    Please take pain medication as needed, if taking narcotics please avoid driving.   We recommend a high fiber diet and use of stool softeners while on narcotics as they might cause constipation.   You will find small pieces of tape over your incision called "steri strips" which will fall on their own, or you can remove them in 10 days.   You may place ice in bilateral groins for 48 hours to decrease swelling.   Do not lift anything heavier than 10 lb for at least 6 weeks.   Avoid swimming pools, baths or hot tubs for at least 2 weeks.   Please call if fevers, chills, sob, increase drainage from your wound or bleeding or you may go to the ED.     Please call as well to make an appointment in 2 weeks with Dr. Ellis for wound recheck.          After Laparoscopic Hernia Repair  You had a procedure called laparoscopic hernia repair. A hernia is a defect in the tough tissue covering the musculature of the abdominal wall (fascia). During laparoscopic hernia surgery, a surgeon inserts a telescope attached to a camera as well as surgical instruments through tiny incisions in your abdomen. The surgeon repairs the hernia with a mesh, which patches the tear or weakness in the fascia.  Home care  · Note that your shoulder may feel tight or your neck may be stiff for 24 to 48 hours after your surgery. This is common and usually lasts a short time. You may also have numbness around the incision area.  · Keep doing the coughing and deep breathing exercises that you learned in the hospital. These will help to prevent lung infection.  · Prevent constipation so you dont strain when going to the bathroom. Eat fruits, vegetables, and whole grains. Drink 6 to 8 glasses of water a day, unless otherwise directed. Use a laxative or a mild stool softener if your healthcare provider says its OK.  · Wash your incision with mild soap and water. Pat it dry. Dont use oil, powder, or lotion on " your incision.  · Shower or take baths as instructed by your healthcare provider. Instructions will vary based on how your incision was closed and how its healing. It may be closed with glue, sutures, or staples. Your healthcare provider may have different advice for each kind.  Activity  · Ask others to help with chores and errands while you recover.  · Dont lift anything heavier than 10 pounds until your healthcare provider says its OK.  · Dont mow the lawn, use a vacuum , or do other strenuous activities until your healthcare provider says it's OK.  · Climb stairs slowly and pause after every few steps.  · Walk as often as you feel able.  · Ask your healthcare provider when you can drive again. This may be when you stop taking pain medicine and can move comfortably from side to side. Dont drive if you are still taking opioid pain medicine.  When to call your healthcare provider   Call your healthcare provider right away if you have any of the following:  · Pain, bleeding, redness, or fluid at the incision site that gets worse  · Fever of 100.4°F (38°C) or higher, or as directed by your healthcare provider  · Vomiting or nausea that doesnt go away  · Inability to urinate  · No bowel movement after 3 days  · Swelling in abdomen or groin that gets worse  · Pain thats not relieved by medicine   Date Last Reviewed: 10/1/2016  © 2022-9762 [employer]. [address] [place] [address], [place] [address]. All rights reserved. This information is not intended as a substitute for professional medical care. Always follow your healthcare professional's instructions.

## 2017-04-18 NOTE — PLAN OF CARE
Patient arrived from PACU with SRAVANTHI Ferrari RN.  Patient stable.  Report received at this time.  Assumed care of patient at this time.

## 2017-04-18 NOTE — PLAN OF CARE
"Pt NAD, AAOx4, VSS. 96% on room air. No complaints of pain, pt states "feels good and ready to go home". PO pain meds administered, pt tolerated well. NS infusing, pt due to void. To be released from anesthesia and transferred to phase II recovery.   "

## 2017-04-18 NOTE — IP AVS SNAPSHOT
[facility]  [address]  [facility] [address]  Phone: [phone]           Patient Discharge Instructions   Our goal is to set you up for success. This packet includes information on your condition, medications, and your home care.  It will help you care for yourself to prevent having to return to the hospital.     Please ask your nurse if you have any questions.      There are many details to remember when preparing to leave the hospital. Here is what you will need to do:    1. Take your medicine. If you are prescribed medications, review your Medication List on the following pages. You may have new medications to  at the pharmacy and others that you'll need to stop taking. Review the instructions for how and when to take your medications. Talk with your doctor or nurses if you are unsure of what to do.     2. Go to your follow-up appointments. Specific follow-up information is listed in the following pages. Your may be contacted by a nurse or clinical provider about future appointments. Be sure we have all of the phone numbers to reach you. Please contact your provider's office if you are unable to make an appointment.     3. Watch for warning signs. Your doctor or nurse will give you detailed warning signs to watch for and when to call for assistance. These instructions may also include educational information about your condition. If you experience any of warning signs to your health, call your doctor.           [facility]  Unless otherwise directed by your provider, please   contact [facility], our nurse care line   that is available for 24/7 assistance.     [phone] (toll-free)     Registered nurses in the [facility]   provide: appointment scheduling, clinical advisement, health education, and other advisory services.                  ** Verify the list of medication(s) below is accurate and up to date. Carry this with you in case of  emergency. If your medications have changed, please notify your healthcare provider.             Medication List      START taking these medications        Additional Info    Begin Date AM Noon PM Bedtime    senna 8.6 mg tablet   Commonly known as:  SENNA CONCENTRATE   Quantity:  60 tablet   Refills:  0   Dose:  1 tablet    Instructions:  Take 1 tablet by mouth once daily.                    First Dose: Tonight (04/18/2017).             CHANGE how you take these medications        Additional Info    Begin Date AM Noon PM Bedtime    clonazePAM 0.25 MG Tbdl   Commonly known as:  KLONOPIN   Quantity:  90 tablet   Refills:  1   Dose:  0.25 mg   What changed:  how much to take   Comments:  Hold until pt calls to fill    Instructions:  Take 1 tablet (0.25 mg total) by mouth nightly.                            * oxycodone-acetaminophen  mg per tablet   Commonly known as:  PERCOCET   Quantity:  90 tablet   Refills:  0   Dose:  1 tablet   What changed:  Another medication with the same name was added. Make sure you understand how and when to take each.    Instructions:  Take 1 tablet by mouth every 4 (four) hours as needed.                 Please follow instructions for most recent instructions for PERCOCET/Oxycodone-Acetaminophen prescription BELOW.           * oxycodone-acetaminophen 5-325 mg per tablet   Commonly known as:  PERCOCET   Quantity:  65 tablet   Refills:  0   Dose:  1 tablet   What changed:  You were already taking a medication with the same name, and this prescription was added. Make sure you understand how and when to take each.    Last time this was given:  1 tablet on 4/18/2017  2:45 PM   Instructions:  Take 1 tablet by mouth every 4 (four) hours as needed for Pain.                 Next Dose: After 6:45 PM today (04/18/2017) as needed for PAIN.           * Notice:  This list has 2 medication(s) that are the same as other medications prescribed for you. Read the directions carefully, and ask your  doctor or other care provider to review them with you.      CONTINUE taking these medications        Additional Info    Begin Date AM Noon PM Bedtime    acetaminophen 650 MG Tbsr   Commonly known as:  TYLENOL   Refills:  0   Dose:  1300 mg    Instructions:  Take 1,300 mg by mouth every 8 (eight) hours.                 Do NOT take with PERCOCET/Oxycodone-Acetaminophen.           aspirin 81 MG EC tablet   Commonly known as:  ECOTRIN   Refills:  0   Dose:  81 mg    Instructions:  Take 81 mg by mouth once daily.                            blood sugar diagnostic Strp   Commonly known as:  ACCU-CHEK FRANKO PLUS TEST STRP   Quantity:  100 strip   Refills:  4    Instructions:  USE  1  STRIP ONE TIME DAILY                            celecoxib 200 MG capsule   Commonly known as:  CeleBREX   Quantity:  90 capsule   Refills:  3   Dose:  200 mg    Instructions:  Take 1 capsule (200 mg total) by mouth once daily.                            docusate sodium 100 MG capsule   Commonly known as:  COLACE   Quantity:  60 capsule   Refills:  0   Dose:  100 mg    Instructions:  Take 1 capsule (100 mg total) by mouth 2 (two) times daily as needed.                            gabapentin 100 MG capsule   Commonly known as:  NEURONTIN   Quantity:  60 capsule   Refills:  1    Instructions:  Take 1 capsule at night for a week then increase to 2 capsules at night thereafter. Increase as tolerated                            glipiZIDE 5 MG tablet   Commonly known as:  GLUCOTROL   Quantity:  360 tablet   Refills:  4    Instructions:  TAKE 2 TABLETS (10 MG) TWO TIMES DAILY BEFORE MEALS                            lancets Misc   Commonly known as:  ACCU-CHEK MULTICLIX LANCET   Quantity:  100 each   Refills:  3    Instructions:  TEST ONE TIME DAILY                            losartan-hydrochlorothiazide 50-12.5 mg 50-12.5 mg per tablet   Commonly known as:  HYZAAR   Quantity:  90 tablet   Refills:  4    Instructions:  TAKE 1 TABLET ONE TIME DAILY                             melatonin 5 mg Tab   Refills:  0   Dose:  5 mg    Instructions:  Take 5 mg by mouth every evening.                            metformin 1000 MG tablet   Commonly known as:  GLUCOPHAGE   Quantity:  180 tablet   Refills:  4    Instructions:  TAKE 1 TABLET TWICE DAILY WITH MEALS                            metoprolol succinate 50 MG 24 hr tablet   Commonly known as:  TOPROL-XL   Quantity:  90 tablet   Refills:  3    Instructions:  TAKE 1 TABLET EVERY DAY                            pramipexole 0.25 MG tablet   Commonly known as:  MIRAPEX   Quantity:  30 tablet   Refills:  5   Dose:  0.25 mg    Instructions:  Take 1 tablet (0.25 mg total) by mouth every evening.                            pravastatin 40 MG tablet   Commonly known as:  PRAVACHOL   Quantity:  90 tablet   Refills:  4    Instructions:  TAKE 1 TABLET EVERY DAY                            sildenafil 100 MG tablet   Commonly known as:  VIAGRA   Quantity:  6 tablet   Refills:  11   Dose:  100 mg    Instructions:  Take 1 tablet (100 mg total) by mouth as needed. Take on an empty stomach                                 Where to Get Your Medications      You can get these medications from any pharmacy     Bring a paper prescription for each of these medications     oxycodone-acetaminophen 5-325 mg per tablet    senna 8.6 mg tablet                  Please bring to all follow up appointments:    1. A copy of your discharge instructions.  2. All medicines you are currently taking in their original bottles.  3. Identification and insurance card.    Please arrive 15 minutes ahead of scheduled appointment time.    Please call 24 hours in advance if you must reschedule your appointment and/or time.        Your Scheduled Appointments     May 01, 2017  3:00 PM CDT   Post OP with MD Denys Mcdaniels Jr. - General Surgery ([facility] )    [address]  [facility] [address]   [phone]            May 23, 2017 11:00 AM  "CDT   Established Patient Visit with LAUREN Shepard - Interventional Pain ([facility])    [address]  [facility] [address]   [phone]            Jun 01, 2017 10:20 AM CDT   Established Patient Visit with Martin Machuca MD   [facility] - Sleep Clinic ([facility])    [address] [facility] [address] [phone]            Jan 16, 2018  9:30 AM CST   Established Patient Visit with Amol New OD   [facility] - Ophthalmology ([facility])    [address] [facility] [address] [phone]              Follow-up Information     Follow up with Chauncey Ellis Jr, MD. Schedule an appointment as soon as possible for a visit in 2 weeks.    Specialties:  General Surgery, Bariatrics    Why:  For wound re-check    Contact information:    [address]  [facility] [address]  [phone]          Discharge Instructions     Future Orders    Call MD for:  difficulty breathing, headache or visual disturbances     Call MD for:  hives     Call MD for:  persistent dizziness or light-headedness     Call MD for:  persistent nausea and vomiting     Call MD for:  redness, tenderness, or signs of infection (pain, swelling, redness, odor or green/yellow discharge around incision site)     Call MD for:  severe uncontrolled pain     Call MD for:  temperature >100.4     Diet general     Questions:    Total calories:      Fat restriction, if any:      Protein restriction, if any:      Na restriction, if any:      Fluid restriction:      Additional restrictions:      Lifting restrictions     Remove dressing in 24 hours         Discharge Instructions       Discharge Instructions From Dr. Ellis    Please take pain medication as needed, if taking narcotics please avoid driving.   We recommend a high fiber diet and use of stool softeners while on narcotics as they might cause constipation.   You will find small pieces of tape over your incision called "steri " "strips" which will fall on their own, or you can remove them in 10 days.   You may place ice in bilateral groins for 48 hours to decrease swelling.   Do not lift anything heavier than 10 lb for at least 6 weeks.   Avoid swimming pools, baths or hot tubs for at least 2 weeks.   Please call if fevers, chills, sob, increase drainage from your wound or bleeding or you may go to the ED.     Please call as well to make an appointment in 2 weeks with Dr. Ellis for wound recheck.          After Laparoscopic Hernia Repair  You had a procedure called laparoscopic hernia repair. A hernia is a defect in the tough tissue covering the musculature of the abdominal wall (fascia). During laparoscopic hernia surgery, a surgeon inserts a telescope attached to a camera as well as surgical instruments through tiny incisions in your abdomen. The surgeon repairs the hernia with a mesh, which patches the tear or weakness in the fascia.  Home care  · Note that your shoulder may feel tight or your neck may be stiff for 24 to 48 hours after your surgery. This is common and usually lasts a short time. You may also have numbness around the incision area.  · Keep doing the coughing and deep breathing exercises that you learned in the hospital. These will help to prevent lung infection.  · Prevent constipation so you dont strain when going to the bathroom. Eat fruits, vegetables, and whole grains. Drink 6 to 8 glasses of water a day, unless otherwise directed. Use a laxative or a mild stool softener if your healthcare provider says its OK.  · Wash your incision with mild soap and water. Pat it dry. Dont use oil, powder, or lotion on your incision.  · Shower or take baths as instructed by your healthcare provider. Instructions will vary based on how your incision was closed and how its healing. It may be closed with glue, sutures, or staples. Your healthcare provider may have different advice for each kind.  Activity  · Ask others to help " "with chores and errands while you recover.  · Dont lift anything heavier than 10 pounds until your healthcare provider says its OK.  · Dont mow the lawn, use a vacuum , or do other strenuous activities until your healthcare provider says it's OK.  · Climb stairs slowly and pause after every few steps.  · Walk as often as you feel able.  · Ask your healthcare provider when you can drive again. This may be when you stop taking pain medicine and can move comfortably from side to side. Dont drive if you are still taking opioid pain medicine.  When to call your healthcare provider   Call your healthcare provider right away if you have any of the following:  · Pain, bleeding, redness, or fluid at the incision site that gets worse  · Fever of 100.4°F (38°C) or higher, or as directed by your healthcare provider  · Vomiting or nausea that doesnt go away  · Inability to urinate  · No bowel movement after 3 days  · Swelling in abdomen or groin that gets worse  · Pain thats not relieved by medicine   Date Last Reviewed: 10/1/2016  © 0927-8894 [employer]. [address] [place] [address]. All rights reserved. This information is not intended as a substitute for professional medical care. Always follow your healthcare professional's instructions.      Primary Diagnosis     Your primary diagnosis was:  Hernia      Admission Information     Date & Time Provider Department CSN    4/18/2017  8:37 AM Chauncey Ellis Jr., MD [facility] [id number]      Care Providers     Provider Role Specialty Primary office phone    Chauncey Ellis Jr., MD Attending Provider General Surgery [phone]    Chauncey Ellis Jr., MD Surgeon  General Surgery [phone]      Your Vitals Were     BP Pulse Temp Resp Height Weight    145/69 54 97.5 °F (36.4 °C) (Skin) 17 6' 3" (1.905 m) 99.8 kg (220 lb)    SpO2 BMI             95% 27.5 kg/m2         Recent Lab Values        1/22/2014 " 8/4/2014 1/21/2015 9/1/2015 2/23/2016 7/19/2016 9/6/2016 3/7/2017      9:37 AM 10:45 AM  8:55 AM  9:52 AM  8:27 AM 11:16 AM  8:50 AM  8:16 AM    A1C 6.3 (H) 5.8 6.5 (H) 6.1 6.0 6.1 5.9 6.0    Comment for A1C at 11:16 AM on 7/19/2016:  According to ADA guidelines, hemoglobin A1C <7.0% represents  optimal control in non-pregnant diabetic patients.  Different  metrics may apply to specific populations.   Standards of Medical Care in Diabetes - 2016.  For the purpose of screening for the presence of diabetes:  <5.7%     Consistent with the absence of diabetes  5.7-6.4%  Consistent with increasing risk for diabetes   (prediabetes)  >or=6.5%  Consistent with diabetes  Currently no consensus exists for use of hemoglobin A1C  for diagnosis of diabetes for children.      Comment for A1C at  8:50 AM on 9/6/2016:  According to ADA guidelines, hemoglobin A1C <7.0% represents  optimal control in non-pregnant diabetic patients.  Different  metrics may apply to specific populations.   Standards of Medical Care in Diabetes - 2016.  For the purpose of screening for the presence of diabetes:  <5.7%     Consistent with the absence of diabetes  5.7-6.4%  Consistent with increasing risk for diabetes   (prediabetes)  >or=6.5%  Consistent with diabetes  Currently no consensus exists for use of hemoglobin A1C  for diagnosis of diabetes for children.      Comment for A1C at  8:16 AM on 3/7/2017:  According to ADA guidelines, hemoglobin A1C <7.0% represents  optimal control in non-pregnant diabetic patients.  Different  metrics may apply to specific populations.   Standards of Medical Care in Diabetes - 2016.  For the purpose of screening for the presence of diabetes:  <5.7%     Consistent with the absence of diabetes  5.7-6.4%  Consistent with increasing risk for diabetes   (prediabetes)  >or=6.5%  Consistent with diabetes  Currently no consensus exists for use of hemoglobin A1C  for diagnosis of diabetes for children.        Allergies as  of 4/18/2017        Reactions    Sulfa (Sulfonamide Antibiotics)     Can't recall from 1995      Advance Directives     An advance directive is a document which, in the event you are no longer able to make decisions for yourself, tells your healthcare team what kind of treatment you do or do not want to receive, or who you would like to make those decisions for you.  If you do not currently have an advance directive, [facility] encourages you to create one.  For more information call:  [phone]), [phone] ([phone]),  or log on to [email or web address].        Language Assistance Services     ATTENTION: Language assistance services are available, free of charge. Please call [phone].      ATENCIÓN: Si habla español, tiene a bahena disposición servicios gratuitos de asistencia lingüística. Llame al [phone].     CHÚ Ý: N?u b?n nói Ti?ng Vi?t, có các d?ch v? h? tr? ngôn ng? mi?n phí dành cho b?n. G?i s? [phone].        Diabetes Discharge Instructions                                    [facility] complies with applicable Federal civil rights laws and does not discriminate on the basis of race, color, national origin, age, disability, or sex.

## 2017-04-18 NOTE — ANESTHESIA RELEASE NOTE
"Anesthesia Release from PACU Note    Patient: Srinath Mcknight    Procedure(s) Performed: Procedure(s) (LRB):  REPAIR-HERNIA-INGUINAL-BILATERAL-LAPAROSCOPIC w/ mesh (Bilateral)    Anesthesia type: general    Post pain: Adequate analgesia    Post assessment: no apparent anesthetic complications    Last Vitals:   Visit Vitals    BP (!) 145/69    Pulse (!) 54    Temp 36.4 °C (97.5 °F) (Skin)    Resp 17    Ht 6' 3" (1.905 m)    Wt 99.8 kg (220 lb)    SpO2 95%    BMI 27.5 kg/m2       Post vital signs: stable    Level of consciousness: awake    Nausea/Vomiting: no nausea/no vomiting    Complications: none    Airway Patency: patent    Respiratory: unassisted    Cardiovascular: stable    Hydration: euvolemic  "

## 2017-04-19 NOTE — PLAN OF CARE
Patient and patient's friend received discharge instructions and prescriptions.  Patient and patient's friend verbalized understanding of all instructions given and all questions were addressed prior to patient's discharge.  Patient's vital signs are stable and within patient's baseline.  Patient tolerated clear liquids PO.  Patient voided 150mL of concentrated clear yellow urine without difficulty in post-op.  Patient denies pain.  Patient denies nausea and vomiting at this time.  Patient meets all criteria for discharge at this time.  All required consents present in patient's chart upon patient's discharge.

## 2017-04-20 NOTE — ANESTHESIA POSTPROCEDURE EVALUATION
"Anesthesia Post Evaluation    Per chart review    Patient: Srinath Mcknight    Procedure(s) Performed: Procedure(s) (LRB):  REPAIR-HERNIA-INGUINAL-BILATERAL-LAPAROSCOPIC w/ mesh (Bilateral)    Final Anesthesia Type: general  Patient location during evaluation: PACU  Patient participation: Yes- Able to Participate  Level of consciousness: awake and alert  Post-procedure vital signs: reviewed and stable  Pain management: adequate  Airway patency: patent  PONV status at discharge: No PONV  Anesthetic complications: no      Cardiovascular status: blood pressure returned to baseline  Respiratory status: unassisted, room air and spontaneous ventilation  Hydration status: euvolemic  Follow-up not needed.        Visit Vitals    /73 (BP Location: Right arm, Patient Position: Lying, BP Method: cNIBP)    Pulse 68    Temp 36.4 °C (97.5 °F) (Temporal)    Resp 18    Ht 6' 3" (1.905 m)    Wt 99.8 kg (220 lb)    SpO2 98%    BMI 27.5 kg/m2       Pain/Niki Score: No Data Recorded      "

## 2017-04-24 DIAGNOSIS — G47.50 PARASOMNIA: ICD-10-CM

## 2017-04-24 RX ORDER — CLONAZEPAM 0.25 MG/1
TABLET, ORALLY DISINTEGRATING ORAL
Qty: 90 TABLET | Refills: 0 | Status: SHIPPED | OUTPATIENT
Start: 2017-04-24 | End: 2017-06-01 | Stop reason: SDUPTHER

## 2017-05-01 ENCOUNTER — OFFICE VISIT (OUTPATIENT)
Dept: SURGERY | Facility: CLINIC | Age: 77
End: 2017-05-01
Payer: MEDICARE

## 2017-05-01 VITALS
HEART RATE: 73 BPM | HEIGHT: 75 IN | WEIGHT: 223.88 LBS | SYSTOLIC BLOOD PRESSURE: 148 MMHG | BODY MASS INDEX: 27.84 KG/M2 | DIASTOLIC BLOOD PRESSURE: 81 MMHG | TEMPERATURE: 98 F

## 2017-05-01 DIAGNOSIS — Z09 POSTOP CHECK: Primary | ICD-10-CM

## 2017-05-01 PROCEDURE — 99999 PR PBB SHADOW E&M-EST. PATIENT-LVL III: CPT | Mod: PBBFAC,,, | Performed by: SURGERY

## 2017-05-01 PROCEDURE — 99024 POSTOP FOLLOW-UP VISIT: CPT | Mod: S$GLB,,, | Performed by: SURGERY

## 2017-05-01 RX ORDER — MELATONIN 5 MG
CAPSULE ORAL
Status: ON HOLD | COMMUNITY
Start: 2017-04-24 | End: 2017-10-27 | Stop reason: HOSPADM

## 2017-05-01 NOTE — PROGRESS NOTES
HPI:  The patient is status post-lap b inguinal hernia repair with mesh.  Doing well.    PHYSICAL EXAM:  Physical Exam    In NAD  Incisions c/d/i  No signs of recurrence    ASSESSMENT:    The patient is doing well after surgery.     PLAN:    Follow up PRN.  Activity: light duty for 4 weeks

## 2017-06-01 ENCOUNTER — OFFICE VISIT (OUTPATIENT)
Dept: SLEEP MEDICINE | Facility: CLINIC | Age: 77
End: 2017-06-01
Payer: MEDICARE

## 2017-06-01 VITALS
SYSTOLIC BLOOD PRESSURE: 110 MMHG | RESPIRATION RATE: 18 BRPM | DIASTOLIC BLOOD PRESSURE: 60 MMHG | HEART RATE: 74 BPM | HEIGHT: 75 IN | WEIGHT: 225.5 LBS | OXYGEN SATURATION: 99 % | BODY MASS INDEX: 28.04 KG/M2

## 2017-06-01 DIAGNOSIS — G47.33 OSA (OBSTRUCTIVE SLEEP APNEA): ICD-10-CM

## 2017-06-01 DIAGNOSIS — G47.61 PLMD (PERIODIC LIMB MOVEMENT DISORDER): Primary | ICD-10-CM

## 2017-06-01 DIAGNOSIS — G47.50 PARASOMNIA: ICD-10-CM

## 2017-06-01 DIAGNOSIS — G47.52 REM BEHAVIORAL DISORDER: Chronic | ICD-10-CM

## 2017-06-01 PROCEDURE — 99214 OFFICE O/P EST MOD 30 MIN: CPT | Mod: S$GLB,,, | Performed by: INTERNAL MEDICINE

## 2017-06-01 PROCEDURE — 1159F MED LIST DOCD IN RCRD: CPT | Mod: S$GLB,,, | Performed by: INTERNAL MEDICINE

## 2017-06-01 PROCEDURE — 99499 UNLISTED E&M SERVICE: CPT | Mod: S$GLB,,, | Performed by: INTERNAL MEDICINE

## 2017-06-01 PROCEDURE — 99999 PR PBB SHADOW E&M-EST. PATIENT-LVL III: CPT | Mod: PBBFAC,,, | Performed by: INTERNAL MEDICINE

## 2017-06-01 PROCEDURE — 1125F AMNT PAIN NOTED PAIN PRSNT: CPT | Mod: S$GLB,,, | Performed by: INTERNAL MEDICINE

## 2017-06-01 RX ORDER — CLONAZEPAM 0.25 MG/1
0.5 TABLET, ORALLY DISINTEGRATING ORAL NIGHTLY
Qty: 90 TABLET | Refills: 0 | Status: SHIPPED | OUTPATIENT
Start: 2017-06-01 | End: 2017-10-04 | Stop reason: SDUPTHER

## 2017-06-01 NOTE — PROGRESS NOTES
Subjective:       Srinath Mcknight is a 76 y.o. male   Last visit was 3/20/2017  Followed up for REM behavior disorder.    He has mild obstructive sleep apnea AHI 6.1 events per hour total events 38  Declined interventions with CPAP  Sleep diary was reviewed.    In the period of April 2017 which shows the sleep has been more consolidated  He still having treatment recommended maybe once or twice a week  We will increase her clonazepam  Other interventions for REM behavior disorder were discussed including the melatonin, he is also on Mirapex for periodic limb movements  I have reviewed the patient's medical history in detail and updated the computerized patient record.    .    Previous Report(s) Reviewed: historical medical records and office notes     The following portions of the patient's history were reviewed and updated as appropriate:   He  has a past medical history of Anemia due to unknown mechanism (11/10/2015); Angina pectoris (2014); Arthritis; Back pain; Coronary artery disease; Diabetes mellitus (20 years); Diabetes mellitus type II (1994); DM (diabetes mellitus) (1994); Hyperlipemia; Hypertension; CHARLES (obstructive sleep apnea); Spinal cord disease; and Trouble in sleeping.  He  does not have any pertinent problems on file.  He  has a past surgical history that includes Rotator cuff repair (Left, 2006); Shoulder arthroscopy w/ rotator cuff repair (Right, 2009); Laminectomy (07/22/2016); and Hernia repair.  His family history includes Cancer (age of onset: 50) in his brother; Diabetes in his father, mother, and sister; Drug abuse in his brother; Heart disease in his father and mother; Hypertension in his father and mother; Stroke in his father.  He  reports that he has never smoked. He has never used smokeless tobacco. He reports that he drinks about 0.6 oz of alcohol per week . He reports that he does not use drugs.  He has a current medication list which includes the following prescription(s):  acetaminophen, aspirin, blood sugar diagnostic, celecoxib, clonazepam, docusate sodium, gabapentin, glipizide, lancets, losartan-hydrochlorothiazide 50-12.5 mg, melatonin, metformin, metoprolol succinate, oxycodone-acetaminophen, oxycodone-acetaminophen, pramipexole, pravastatin, senna, and sildenafil.  Current Outpatient Prescriptions on File Prior to Visit   Medication Sig Dispense Refill    acetaminophen (TYLENOL) 650 MG TbSR Take 1,300 mg by mouth every 8 (eight) hours.      aspirin (ECOTRIN) 81 MG EC tablet Take 81 mg by mouth once daily.      blood sugar diagnostic (ACCU-CHEK FRANKO PLUS TEST STRP) Strp USE  1  STRIP ONE TIME DAILY 100 strip 4    celecoxib (CELEBREX) 200 MG capsule Take 1 capsule (200 mg total) by mouth once daily. 90 capsule 3    docusate sodium (COLACE) 100 MG capsule Take 1 capsule (100 mg total) by mouth 2 (two) times daily as needed. 60 capsule 0    gabapentin (NEURONTIN) 100 MG capsule Take 1 capsule at night for a week then increase to 2 capsules at night thereafter. Increase as tolerated 60 capsule 1    glipiZIDE (GLUCOTROL) 5 MG tablet TAKE 2 TABLETS (10 MG) TWO TIMES DAILY BEFORE MEALS 360 tablet 4    lancets (ACCU-CHEK MULTICLIX LANCET) Misc TEST ONE TIME DAILY 100 each 3    losartan-hydrochlorothiazide 50-12.5 mg (HYZAAR) 50-12.5 mg per tablet TAKE 1 TABLET ONE TIME DAILY 90 tablet 4    melatonin 5 mg Cap       metformin (GLUCOPHAGE) 1000 MG tablet TAKE 1 TABLET TWICE DAILY WITH MEALS 180 tablet 4    metoprolol succinate (TOPROL-XL) 50 MG 24 hr tablet TAKE 1 TABLET EVERY DAY 90 tablet 3    oxycodone-acetaminophen (PERCOCET)  mg per tablet Take 1 tablet by mouth every 4 (four) hours as needed. 90 tablet 0    oxycodone-acetaminophen (PERCOCET) 5-325 mg per tablet Take 1 tablet by mouth every 4 (four) hours as needed for Pain. 65 tablet 0    pramipexole (MIRAPEX) 0.25 MG tablet Take 1 tablet (0.25 mg total) by mouth every evening. 30 tablet 5    pravastatin  "(PRAVACHOL) 40 MG tablet TAKE 1 TABLET EVERY DAY 90 tablet 4    senna (SENNA CONCENTRATE) 8.6 mg tablet Take 1 tablet by mouth once daily. 60 tablet 0    sildenafil (VIAGRA) 100 MG tablet Take 1 tablet (100 mg total) by mouth as needed. Take on an empty stomach 6 tablet 11    [DISCONTINUED] clonazePAM (KLONOPIN) 0.25 MG TbDL DISSOLVE ONE TABLET BY MOUTH EVERY NIGHT AT BEDTIME 90 tablet 0    [DISCONTINUED] melatonin 5 mg Tab Take 5 mg by mouth every evening.       No current facility-administered medications on file prior to visit.      He is allergic to sulfa (sulfonamide antibiotics)..    Review of Systems  A comprehensive review of systems was negative.      Objective:      Vitals:    06/01/17 1033   BP: 110/60   BP Location: Right arm   Patient Position: Sitting   BP Method: Manual   Pulse: 74   Resp: 18   SpO2: 99%   Weight: 102.3 kg (225 lb 8.5 oz)   Height: 6' 3" (1.905 m)       Physical Exam   Constitutional: He is oriented to person, place, and time. He appears well-developed and well-nourished.   HENT:   Head: Normocephalic and atraumatic.   Nose: Nose normal.   Eyes: EOM are normal. Pupils are equal, round, and reactive to light. Left eye exhibits no discharge.   Neck: Normal range of motion. Neck supple. No JVD present.   Cardiovascular: Normal rate, regular rhythm and normal heart sounds.    No murmur heard.  Pulmonary/Chest: Effort normal. No respiratory distress.   Abdominal: Soft. Bowel sounds are normal.   Musculoskeletal: Normal range of motion. He exhibits no deformity.   Neurological: He is alert and oriented to person, place, and time. He has normal reflexes.   Skin: Skin is warm and dry.   Psychiatric: He has a normal mood and affect.   Nursing note and vitals reviewed.       Diary was reviewed from 3/4/2017 until 4/12/2017.  Average bedtime is between 11 PM and midnight.  Wakeup time is around 7:53 AM.  Wake after sleep onset occurs once around 3 AM.  Sleep diary for the period of April " shows more consolidated sleep.  Assessment:      Problem List Items Addressed This Visit     REM behavioral disorder (Chronic)     Safety interventions discussed  Increased Klonopin to 0.5 mg  Melatonin 5 mg  Dream enactment once a week         PLMD (periodic limb movement disorder) - Primary     Continue Mirapex  0.25 mg  Sleep diary         Relevant Medications    clonazePAM (KLONOPIN) 0.25 MG TbDL    CHARLES (obstructive sleep apnea)     AHI was 6.1/hr ( 38 events)  Borderline CHARLES   Declines interventions    Discussed Morbidity of untreated CHARLES  Still reluctant to treat           Other Visit Diagnoses     Parasomnia        Relevant Medications    clonazePAM (KLONOPIN) 0.25 MG TbDL         Plan:        Return in about 6 months (around 12/1/2017) for Sleep Hygeine, regular bed and wake time, Sleep diary , Morning exercise,.    This note was prepared using voice recognition system and is likely to have sound alike errors that may have been overlooked even after proof reading.  Please call me with any questions    Discussed diagnosis, its evaluation, treatment and usual course. All questions answered.    Thank you for the courtesy of participating in the care of this patient    Martin Machuca MD

## 2017-06-01 NOTE — ASSESSMENT & PLAN NOTE
AHI was 6.1/hr ( 38 events)  Borderline CHARLES   Declines interventions    Discussed Morbidity of untreated CHARLES  Still reluctant to treat

## 2017-06-01 NOTE — ASSESSMENT & PLAN NOTE
Safety interventions discussed  Increased Klonopin to 0.5 mg  Melatonin 5 mg  Dream enactment once a week

## 2017-06-01 NOTE — PATIENT INSTRUCTIONS
Thank You    Thank you for choosing the PULMONARY MEDICINE DEPARTMENT at [facility]. At [facility], your satisfaction is our priority. You may receive a survey asking about your experience with us. We would appreciate you taking the time to complete and return the survey. Our goal is to provide you with a level 5 or Very Good experience. We thank you for allowing us to serve you and value your feedback.    Your Nurse/Medical Assistant: ___Codi Quigley___________________________    Sincerely,  Pulmonary Department  Phone: [phone]  Fax: [phone]

## 2017-06-13 ENCOUNTER — CLINICAL SUPPORT (OUTPATIENT)
Dept: REHABILITATION | Facility: HOSPITAL | Age: 77
End: 2017-06-13
Attending: ANESTHESIOLOGY
Payer: MEDICARE

## 2017-06-13 DIAGNOSIS — M12.88 TRANSIENT ARTHROPATHY, OTHER SPECIFIED SITE: Primary | ICD-10-CM

## 2017-06-13 DIAGNOSIS — M51.36 DEGENERATION OF LUMBAR INTERVERTEBRAL DISC: ICD-10-CM

## 2017-06-13 DIAGNOSIS — M96.1 POSTLAMINECTOMY SYNDROME, UNSPECIFIED REGION: ICD-10-CM

## 2017-06-13 PROCEDURE — 97163 PT EVAL HIGH COMPLEX 45 MIN: CPT

## 2017-06-13 PROCEDURE — 97140 MANUAL THERAPY 1/> REGIONS: CPT

## 2017-06-13 PROCEDURE — G8978 MOBILITY CURRENT STATUS: HCPCS | Mod: CK

## 2017-06-13 PROCEDURE — G8979 MOBILITY GOAL STATUS: HCPCS | Mod: CI

## 2017-06-13 NOTE — PLAN OF CARE
PHYSICAL THERAPY INITIAL OUTPATIENT EVALUATION    Referring Provider:  Dr. Luis A Diaz    Diagnosis:       ICD-10-CM ICD-9-CM    1. Transient arthropathy, other specified site M12.88 716.48    2. Postlaminectomy syndrome, unspecified region M96.1 722.80    3. 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  Patient reports pain is biggest complaint is lower back with left quad greater than right side pain; with walking pain goes into calves bilaterally with right greater than the left;  Night pain in the knees left greater than right. Left anterior hip pain into adductor.    Past Medical History:    Past Medical History:   Diagnosis Date    Anemia due to unknown mechanism 11/10/2015    Angina pectoris     not since    Arthritis     Back pain     Coronary artery disease     Diabetes mellitus 20 years     am 2014    Diabetes mellitus type II      am 2015    DM (diabetes mellitus)     BS 78 am 01/10/2017    Hyperlipemia     Hypertension     CHARLES (obstructive sleep apnea)     Spinal cord disease     Trouble in sleeping        Problem List:    Patient Active Problem List   Diagnosis    Combined hyperlipidemia associated with type 2 diabetes mellitus    REM behavioral disorder    Coronary artery disease, occlusive    Hypertension associated with diabetes    Osteoarthritis of spine with radiculopathy, lumbar region    Osteoarthritis of hip    Aortic calcification    Anemia due to unknown mechanism    Pre-operative cardiovascular examination    Type 2 diabetes mellitus without complication, without long-term current use of insulin    PLMD (periodic limb movement disorder)    CHARLES (obstructive sleep apnea)    Bilateral inguinal hernia without  obstruction or gangrene       Current Medications:    Current Outpatient Prescriptions:     acetaminophen (TYLENOL) 650 MG TbSR, Take 1,300 mg by mouth every 8 (eight) hours., Disp: , Rfl:     aspirin (ECOTRIN) 81 MG EC tablet, Take 81 mg by mouth once daily., Disp: , Rfl:     blood sugar diagnostic (ACCU-CHEK FRANKO PLUS TEST STRP) Strp, USE  1  STRIP ONE TIME DAILY, Disp: 100 strip, Rfl: 4    celecoxib (CELEBREX) 200 MG capsule, Take 1 capsule (200 mg total) by mouth once daily., Disp: 90 capsule, Rfl: 3    clonazePAM (KLONOPIN) 0.25 MG TbDL, Take 2 tablets (0.5 mg total) by mouth nightly., Disp: 90 tablet, Rfl: 0    docusate sodium (COLACE) 100 MG capsule, Take 1 capsule (100 mg total) by mouth 2 (two) times daily as needed., Disp: 60 capsule, Rfl: 0    gabapentin (NEURONTIN) 100 MG capsule, Take 1 capsule at night for a week then increase to 2 capsules at night thereafter. Increase as tolerated, Disp: 60 capsule, Rfl: 1    glipiZIDE (GLUCOTROL) 5 MG tablet, TAKE 2 TABLETS (10 MG) TWO TIMES DAILY BEFORE MEALS, Disp: 360 tablet, Rfl: 4    lancets (ACCU-CHEK MULTICLIX LANCET) Misc, TEST ONE TIME DAILY, Disp: 100 each, Rfl: 3    losartan-hydrochlorothiazide 50-12.5 mg (HYZAAR) 50-12.5 mg per tablet, TAKE 1 TABLET ONE TIME DAILY, Disp: 90 tablet, Rfl: 4    melatonin 5 mg Cap, , Disp: , Rfl:     metformin (GLUCOPHAGE) 1000 MG tablet, TAKE 1 TABLET TWICE DAILY WITH MEALS, Disp: 180 tablet, Rfl: 4    metoprolol succinate (TOPROL-XL) 50 MG 24 hr tablet, TAKE 1 TABLET EVERY DAY, Disp: 90 tablet, Rfl: 3    oxycodone-acetaminophen (PERCOCET)  mg per tablet, Take 1 tablet by mouth every 4 (four) hours as needed., Disp: 90 tablet, Rfl: 0    oxycodone-acetaminophen (PERCOCET) 5-325 mg per tablet, Take 1 tablet by mouth every 4 (four) hours as needed for Pain., Disp: 65 tablet, Rfl: 0    pramipexole (MIRAPEX) 0.25 MG tablet, Take 1 tablet (0.25 mg total) by mouth every evening., Disp: 30 tablet, Rfl: 5     pravastatin (PRAVACHOL) 40 MG tablet, TAKE 1 TABLET EVERY DAY, Disp: 90 tablet, Rfl: 4    senna (SENNA CONCENTRATE) 8.6 mg tablet, Take 1 tablet by mouth once daily., Disp: 60 tablet, Rfl: 0    sildenafil (VIAGRA) 100 MG tablet, Take 1 tablet (100 mg total) by mouth as needed. Take on an empty stomach, Disp: 6 tablet, Rfl: 11    OBJECTIVE:  Pain: 9/10 with walking in the morning, located in back, described as aching and pain.    Sensation:  intact to light touch     Lumbar ROM: Forward Bending   65      Backwardbending  20     Sidebend Right  35      Sidebend Left   35     Rotation Right   40     Rotation Left   40      Strength:  Gluteus Medius   3/5      Psoas    4/5     Quadriceps   4/5      Rectus Abdominis  4/5     Anterior Tibialis  4-/5     Gastrocnemius  5/5     Transverse Abdominis 3/5    Function: MODIFIED OSWESTRY LOW BACK PAIN DISABILITY QUESTIONNAIRE    The following scores are patient-reported and range from 0-5, with 0 being least impaired and 5 being most impaired.        Eval  Section 1- Pain intensity    2/5   Section 2- Person care  2/5   Section 3 Lifting- Optional  3/5     Section 4  Walking  4/5  Section 5 Sitting   3/5   Section 6 Standing  3/5  Section 7 Sleeping  4/5   Section 8 Social Life   2/5  Section 9 Traveling  2/5   Section 10 Employ/home  3/5    Patient reports 56% disability on the Modified Oswestry Low Back Pain Disability Questionnaire.  Currently [id number] and goal is [id number]    Other:  Shifts to right with right hip higher in standing anterior pelvic tilt in standing, tight hip flexors, tight adductors, unable to ER left hip to 10 degrees, IT band tightness, hamstring tightness right greater than left, QL spasm with ES spasms in lower back, piriformis guarding with spasms bilateral    ASSESSMENT:  The patient is a 76 y.o. year old male who presents to physical therapy with complaints of back pain left hip pain and bilateral leg pain    Co-morbidities which may impact the plan  of care and potentially impede the patient's progress in therapy include:  Multiple previous surgeries that were done to correct back pain; the multiple joint problems in the back and lower extremeties.    The patient's clinical presentation is evolving.  Based on patient's evolving clinical presentation, Oa, previous back surgery, HTN, DM co-morbidities, and examination of entire lower extremity and back body systems, patient presents with high complexity.    Short Term Goals:  (3 weeks)  1.  Patient will independent with HEP.  2.  Patient will increase oswestry score at least 9 points.  3.  Patient will increase flexibility.  4.  Patient will increase core strength to -4/5.    Long Term Goals:  (6 weeks)  1.  Patient will improve oswestry at least 20.  2.  Patient will decrease pain to 3/10.  3.  Patient will improve overall strength to 4/5  TREATMENT PROVIDED:    Initial evaluation completed.    Manual Therapy:  TDN with estim    Therapeutic Exercise:  NT      PLAN:  Patient will benefit from physical therapy (2) x/week for (6) weeks including manual therapy, therapeutic exercise, functional activities, modalities, and patient education.    Thank you for this referral.

## 2017-06-13 NOTE — PROGRESS NOTES
PHYSICAL THERAPY INITIAL OUTPATIENT EVALUATION    Referring Provider:  Dr. Luis A Diaz    Diagnosis:       ICD-10-CM ICD-9-CM    1. Transient arthropathy, other specified site M12.88 716.48    2. Postlaminectomy syndrome, unspecified region M96.1 722.80    3. 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  Patient reports pain is biggest complaint is lower back with left quad greater than right side pain; with walking pain goes into calves bilaterally with right greater than the left;  Night pain in the knees left greater than right. Left anterior hip pain into adductor.    Past Medical History:    Past Medical History:   Diagnosis Date    Anemia due to unknown mechanism 11/10/2015    Angina pectoris     not since    Arthritis     Back pain     Coronary artery disease     Diabetes mellitus 20 years     am 2014    Diabetes mellitus type II      am 2015    DM (diabetes mellitus)     BS 78 am 01/10/2017    Hyperlipemia     Hypertension     CHARLES (obstructive sleep apnea)     Spinal cord disease     Trouble in sleeping        Problem List:    Patient Active Problem List   Diagnosis    Combined hyperlipidemia associated with type 2 diabetes mellitus    REM behavioral disorder    Coronary artery disease, occlusive    Hypertension associated with diabetes    Osteoarthritis of spine with radiculopathy, lumbar region    Osteoarthritis of hip    Aortic calcification    Anemia due to unknown mechanism    Pre-operative cardiovascular examination    Type 2 diabetes mellitus without complication, without long-term current use of insulin    PLMD (periodic limb movement disorder)    CHARLES (obstructive sleep apnea)    Bilateral inguinal hernia without  obstruction or gangrene       Current Medications:    Current Outpatient Prescriptions:     acetaminophen (TYLENOL) 650 MG TbSR, Take 1,300 mg by mouth every 8 (eight) hours., Disp: , Rfl:     aspirin (ECOTRIN) 81 MG EC tablet, Take 81 mg by mouth once daily., Disp: , Rfl:     blood sugar diagnostic (ACCU-CHEK FRANKO PLUS TEST STRP) Strp, USE  1  STRIP ONE TIME DAILY, Disp: 100 strip, Rfl: 4    celecoxib (CELEBREX) 200 MG capsule, Take 1 capsule (200 mg total) by mouth once daily., Disp: 90 capsule, Rfl: 3    clonazePAM (KLONOPIN) 0.25 MG TbDL, Take 2 tablets (0.5 mg total) by mouth nightly., Disp: 90 tablet, Rfl: 0    docusate sodium (COLACE) 100 MG capsule, Take 1 capsule (100 mg total) by mouth 2 (two) times daily as needed., Disp: 60 capsule, Rfl: 0    gabapentin (NEURONTIN) 100 MG capsule, Take 1 capsule at night for a week then increase to 2 capsules at night thereafter. Increase as tolerated, Disp: 60 capsule, Rfl: 1    glipiZIDE (GLUCOTROL) 5 MG tablet, TAKE 2 TABLETS (10 MG) TWO TIMES DAILY BEFORE MEALS, Disp: 360 tablet, Rfl: 4    lancets (ACCU-CHEK MULTICLIX LANCET) Misc, TEST ONE TIME DAILY, Disp: 100 each, Rfl: 3    losartan-hydrochlorothiazide 50-12.5 mg (HYZAAR) 50-12.5 mg per tablet, TAKE 1 TABLET ONE TIME DAILY, Disp: 90 tablet, Rfl: 4    melatonin 5 mg Cap, , Disp: , Rfl:     metformin (GLUCOPHAGE) 1000 MG tablet, TAKE 1 TABLET TWICE DAILY WITH MEALS, Disp: 180 tablet, Rfl: 4    metoprolol succinate (TOPROL-XL) 50 MG 24 hr tablet, TAKE 1 TABLET EVERY DAY, Disp: 90 tablet, Rfl: 3    oxycodone-acetaminophen (PERCOCET)  mg per tablet, Take 1 tablet by mouth every 4 (four) hours as needed., Disp: 90 tablet, Rfl: 0    oxycodone-acetaminophen (PERCOCET) 5-325 mg per tablet, Take 1 tablet by mouth every 4 (four) hours as needed for Pain., Disp: 65 tablet, Rfl: 0    pramipexole (MIRAPEX) 0.25 MG tablet, Take 1 tablet (0.25 mg total) by mouth every evening., Disp: 30 tablet, Rfl: 5     pravastatin (PRAVACHOL) 40 MG tablet, TAKE 1 TABLET EVERY DAY, Disp: 90 tablet, Rfl: 4    senna (SENNA CONCENTRATE) 8.6 mg tablet, Take 1 tablet by mouth once daily., Disp: 60 tablet, Rfl: 0    sildenafil (VIAGRA) 100 MG tablet, Take 1 tablet (100 mg total) by mouth as needed. Take on an empty stomach, Disp: 6 tablet, Rfl: 11    OBJECTIVE:  Pain: 9/10 with walking in the morning, located in back, described as aching and pain.    Sensation:  intact to light touch     Lumbar ROM: Forward Bending   65      Backwardbending  20     Sidebend Right  35      Sidebend Left   35     Rotation Right   40     Rotation Left   40      Strength:  Gluteus Medius   3/5      Psoas    4/5     Quadriceps   4/5      Rectus Abdominis  4/5     Anterior Tibialis  4-/5     Gastrocnemius  5/5     Transverse Abdominis 3/5    Function: MODIFIED OSWESTRY LOW BACK PAIN DISABILITY QUESTIONNAIRE    The following scores are patient-reported and range from 0-5, with 0 being least impaired and 5 being most impaired.        Eval  Section 1- Pain intensity    2/5   Section 2- Person care  2/5   Section 3 Lifting- Optional  3/5     Section 4  Walking  4/5  Section 5 Sitting   3/5   Section 6 Standing  3/5  Section 7 Sleeping  4/5   Section 8 Social Life   2/5  Section 9 Traveling  2/5   Section 10 Employ/home  3/5    Patient reports 56% disability on the Modified Oswestry Low Back Pain Disability Questionnaire.  Currently [id number] and goal is [id number]    Other:  Shifts to right with right hip higher in standing anterior pelvic tilt in standing, tight hip flexors, tight adductors, unable to ER left hip to 10 degrees, IT band tightness, hamstring tightness right greater than left, QL spasm with ES spasms in lower back, piriformis guarding with spasms bilateral    ASSESSMENT:  The patient is a 76 y.o. year old male who presents to physical therapy with complaints of back pain left hip pain and bilateral leg pain    Co-morbidities which may impact the plan  of care and potentially impede the patient's progress in therapy include:  Multiple previous surgeries that were done to correct back pain; the multiple joint problems in the back and lower extremeties.    The patient's clinical presentation is evolving.  Based on patient's evolving clinical presentation, Oa, previous back surgery, HTN, DM co-morbidities, and examination of entire lower extremity and back body systems, patient presents with high complexity.    Short Term Goals:  (3 weeks)  1.  Patient will independent with HEP.  2.  Patient will increase oswestry score at least 9 points.  3.  Patient will increase flexibility.  4.  Patient will increase core strength to -4/5.    Long Term Goals:  (6 weeks)  1.  Patient will improve oswestry at least 20.  2.  Patient will decrease pain to 3/10.  3.  Patient will improve overall strength to 4/5  TREATMENT PROVIDED:    Initial evaluation completed.    Manual Therapy:  TDN with estim    Therapeutic Exercise:  NT      PLAN:  Patient will benefit from physical therapy (2) x/week for (6) weeks including manual therapy, therapeutic exercise, functional activities, modalities, and patient education.    Thank you for this referral.    These services are reasonable and necessary for the conditions set forth above while under my care.

## 2017-06-15 ENCOUNTER — CLINICAL SUPPORT (OUTPATIENT)
Dept: REHABILITATION | Facility: HOSPITAL | Age: 77
End: 2017-06-15
Attending: ANESTHESIOLOGY
Payer: MEDICARE

## 2017-06-15 DIAGNOSIS — M51.36 DEGENERATION OF LUMBAR INTERVERTEBRAL DISC: ICD-10-CM

## 2017-06-15 DIAGNOSIS — M12.88 TRANSIENT ARTHROPATHY, OTHER SPECIFIED SITE: ICD-10-CM

## 2017-06-15 DIAGNOSIS — M96.1 POSTLAMINECTOMY SYNDROME, UNSPECIFIED REGION: Primary | ICD-10-CM

## 2017-06-15 PROCEDURE — 97110 THERAPEUTIC EXERCISES: CPT

## 2017-06-15 PROCEDURE — 97140 MANUAL THERAPY 1/> REGIONS: CPT

## 2017-06-15 NOTE — PROGRESS NOTES
PHYSICAL THERAPY INITIAL OUTPATIENT EVALUATION    Referring Provider:  Dr. Luis A Diaz    Diagnosis:       ICD-10-CM ICD-9-CM    1. Postlaminectomy syndrome, unspecified region M96.1 722.80    2. Degeneration of lumbar intervertebral disc M51.36 722.52    3. Transient arthropathy, other specified site M12.88 716.48        Orders:  Evaluate and Treat    Date of Initial Evaluation:  17      Orders :  5/15/17    Coding Cycle Visit # 2    SUBJECTIVE:  Patient is a 76 year old male retired from the [employer].  Patient reports pain is biggest complaint is lower back with left quad greater than right side pain; with walking pain goes into calves bilaterally with right greater than the left;  Night pain in the knees left greater than right. Left anterior hip pain into adductor.    Daily comments:  Patient having the same complaints for night time pain.  Patient also reports not too sore at all from the needling    Past Medical History:    Past Medical History:   Diagnosis Date    Anemia due to unknown mechanism 11/10/2015    Angina pectoris     not since    Arthritis     Back pain     Coronary artery disease     Diabetes mellitus 20 years     am 2014    Diabetes mellitus type II      am 2015    DM (diabetes mellitus)     BS 78 am 01/10/2017    Hyperlipemia     Hypertension     CHARLES (obstructive sleep apnea)     Spinal cord disease     Trouble in sleeping        Problem List:    Patient Active Problem List   Diagnosis    Combined hyperlipidemia associated with type 2 diabetes mellitus    REM behavioral disorder    Coronary artery disease, occlusive    Hypertension associated with diabetes    Osteoarthritis of spine with radiculopathy, lumbar region    Osteoarthritis of hip    Aortic calcification    Anemia due to unknown mechanism    Pre-operative cardiovascular examination    Type 2 diabetes mellitus without complication, without long-term current  use of insulin    PLMD (periodic limb movement disorder)    CHARLES (obstructive sleep apnea)    Bilateral inguinal hernia without obstruction or gangrene       Current Medications:    Current Outpatient Prescriptions:     acetaminophen (TYLENOL) 650 MG TbSR, Take 1,300 mg by mouth every 8 (eight) hours., Disp: , Rfl:     aspirin (ECOTRIN) 81 MG EC tablet, Take 81 mg by mouth once daily., Disp: , Rfl:     blood sugar diagnostic (ACCU-CHEK FRANKO PLUS TEST STRP) Strp, USE  1  STRIP ONE TIME DAILY, Disp: 100 strip, Rfl: 4    celecoxib (CELEBREX) 200 MG capsule, Take 1 capsule (200 mg total) by mouth once daily., Disp: 90 capsule, Rfl: 3    clonazePAM (KLONOPIN) 0.25 MG TbDL, Take 2 tablets (0.5 mg total) by mouth nightly., Disp: 90 tablet, Rfl: 0    docusate sodium (COLACE) 100 MG capsule, Take 1 capsule (100 mg total) by mouth 2 (two) times daily as needed., Disp: 60 capsule, Rfl: 0    gabapentin (NEURONTIN) 100 MG capsule, Take 1 capsule at night for a week then increase to 2 capsules at night thereafter. Increase as tolerated, Disp: 60 capsule, Rfl: 1    glipiZIDE (GLUCOTROL) 5 MG tablet, TAKE 2 TABLETS (10 MG) TWO TIMES DAILY BEFORE MEALS, Disp: 360 tablet, Rfl: 4    lancets (ACCU-CHEK MULTICLIX LANCET) Misc, TEST ONE TIME DAILY, Disp: 100 each, Rfl: 3    losartan-hydrochlorothiazide 50-12.5 mg (HYZAAR) 50-12.5 mg per tablet, TAKE 1 TABLET ONE TIME DAILY, Disp: 90 tablet, Rfl: 4    melatonin 5 mg Cap, , Disp: , Rfl:     metformin (GLUCOPHAGE) 1000 MG tablet, TAKE 1 TABLET TWICE DAILY WITH MEALS, Disp: 180 tablet, Rfl: 4    metoprolol succinate (TOPROL-XL) 50 MG 24 hr tablet, TAKE 1 TABLET EVERY DAY, Disp: 90 tablet, Rfl: 3    oxycodone-acetaminophen (PERCOCET)  mg per tablet, Take 1 tablet by mouth every 4 (four) hours as needed., Disp: 90 tablet, Rfl: 0    oxycodone-acetaminophen (PERCOCET) 5-325 mg per tablet, Take 1 tablet by mouth every 4 (four) hours as needed for Pain., Disp: 65 tablet,  Rfl: 0    pramipexole (MIRAPEX) 0.25 MG tablet, Take 1 tablet (0.25 mg total) by mouth every evening., Disp: 30 tablet, Rfl: 5    pravastatin (PRAVACHOL) 40 MG tablet, TAKE 1 TABLET EVERY DAY, Disp: 90 tablet, Rfl: 4    senna (SENNA CONCENTRATE) 8.6 mg tablet, Take 1 tablet by mouth once daily., Disp: 60 tablet, Rfl: 0    sildenafil (VIAGRA) 100 MG tablet, Take 1 tablet (100 mg total) by mouth as needed. Take on an empty stomach, Disp: 6 tablet, Rfl: 11    OBJECTIVE:  Pain: 9/10 with walking in the morning, located in back, described as aching and pain.    Sensation:  intact to light touch     Lumbar ROM: Forward Bending   65      Backwardbending  20     Sidebend Right  35      Sidebend Left   35     Rotation Right   40     Rotation Left   40      Strength:  Gluteus Medius   3/5      Psoas    4/5     Quadriceps   4/5      Rectus Abdominis  4/5     Anterior Tibialis  4-/5     Gastrocnemius  5/5     Transverse Abdominis 3/5    Function: MODIFIED OSWESTRY LOW BACK PAIN DISABILITY QUESTIONNAIRE    The following scores are patient-reported and range from 0-5, with 0 being least impaired and 5 being most impaired.        Eval  Section 1- Pain intensity    2/5   Section 2- Person care  2/5   Section 3 Lifting- Optional  3/5     Section 4  Walking  4/5  Section 5 Sitting   3/5   Section 6 Standing  3/5  Section 7 Sleeping  4/5   Section 8 Social Life   2/5  Section 9 Traveling  2/5   Section 10 Employ/home  3/5    Patient reports 56% disability on the Modified Oswestry Low Back Pain Disability Questionnaire.  Currently [id number] and goal is [id number]    Other:  Shifts to right with right hip higher in standing anterior pelvic tilt in standing, tight hip flexors, tight adductors, unable to ER left hip to 10 degrees, IT band tightness, hamstring tightness right greater than left, QL spasm with ES spasms in lower back, piriformis guarding with spasms bilateral    ASSESSMENT:  The patient is a 76 y.o. year old male who presents  to physical therapy with complaints of back pain left hip pain and bilateral leg pain    Co-morbidities which may impact the plan of care and potentially impede the patient's progress in therapy include:  Multiple previous surgeries that were done to correct back pain; the multiple joint problems in the back and lower extremeties.    The patient's clinical presentation is evolving.  Based on patient's evolving clinical presentation, Oa, previous back surgery, HTN, DM co-morbidities, and examination of entire lower extremity and back body systems, patient presents with high complexity.    Short Term Goals:  (3 weeks)  1.  Patient will independent with HEP.  2.  Patient will increase oswestry score at least 9 points.  3.  Patient will increase flexibility.  4.  Patient will increase core strength to -4/5.    Long Term Goals:  (6 weeks)  1.  Patient will improve oswestry at least 20.  2.  Patient will decrease pain to 3/10.  3.  Patient will improve overall strength to 4/5  TREATMENT PROVIDED:      Manual Therapy:  TDN with estim and ISTM (25 minutes)    Therapeutic Exercise:  HSS supine bilateral 30 sec x 5, adductor stretch bilateral 30 sec x 5, knee to chest 10 sec x 10 right and left, supine TA 10 s x 10, quad sets 5s x 10 right and left, clams right and left 2 x 10, bridges 2 x15, prone hip extension 2 x10 right and left (25 minutes)      PLAN:  Patient will benefit from physical therapy (2) x/week for (6) weeks including manual therapy, therapeutic exercise, functional activities, modalities, and patient education.    Thank you for this referral.    These services are reasonable and necessary for the conditions set forth above while under my care.

## 2017-06-20 ENCOUNTER — CLINICAL SUPPORT (OUTPATIENT)
Dept: REHABILITATION | Facility: HOSPITAL | Age: 77
End: 2017-06-20
Attending: ANESTHESIOLOGY
Payer: MEDICARE

## 2017-06-20 DIAGNOSIS — M96.1 POSTLAMINECTOMY SYNDROME, UNSPECIFIED REGION: Primary | ICD-10-CM

## 2017-06-20 DIAGNOSIS — M51.36 DEGENERATION OF LUMBAR INTERVERTEBRAL DISC: ICD-10-CM

## 2017-06-20 DIAGNOSIS — M12.88 TRANSIENT ARTHROPATHY, OTHER SPECIFIED SITE: ICD-10-CM

## 2017-06-20 PROCEDURE — 97110 THERAPEUTIC EXERCISES: CPT

## 2017-06-20 PROCEDURE — 97140 MANUAL THERAPY 1/> REGIONS: CPT

## 2017-06-20 NOTE — PROGRESS NOTES
PHYSICAL THERAPY INITIAL OUTPATIENT EVALUATION    Referring Provider:  Dr. Luis A Diaz    Diagnosis:       ICD-10-CM ICD-9-CM    1. Postlaminectomy syndrome, unspecified region M96.1 722.80    2. Degeneration of lumbar intervertebral disc M51.36 722.52    3. Transient arthropathy, other specified site M12.88 716.48        Orders:  Evaluate and Treat    Date of Initial Evaluation:  17      Orders :  5/15/17    Coding Cycle Visit # 2    SUBJECTIVE:  Patient is a 76 year old male retired from the [employer].  Patient reports pain is biggest complaint is lower back with left quad greater than right side pain; with walking pain goes into calves bilaterally with right greater than the left;  Night pain in the knees left greater than right. Left anterior hip pain into adductor.    Daily comments:  Patient having the same complaints for night time pain.  Patient also reports not too sore at all from the needling    Past Medical History:    Past Medical History:   Diagnosis Date    Anemia due to unknown mechanism 11/10/2015    Angina pectoris     not since    Arthritis     Back pain     Coronary artery disease     Diabetes mellitus 20 years     am 2014    Diabetes mellitus type II      am 2015    DM (diabetes mellitus)     BS 78 am 01/10/2017    Hyperlipemia     Hypertension     CHARLES (obstructive sleep apnea)     Spinal cord disease     Trouble in sleeping        Problem List:    Patient Active Problem List   Diagnosis    Combined hyperlipidemia associated with type 2 diabetes mellitus    REM behavioral disorder    Coronary artery disease, occlusive    Hypertension associated with diabetes    Osteoarthritis of spine with radiculopathy, lumbar region    Osteoarthritis of hip    Aortic calcification    Anemia due to unknown mechanism    Pre-operative cardiovascular examination    Type 2 diabetes mellitus without complication, without long-term current  use of insulin    PLMD (periodic limb movement disorder)    CHARLES (obstructive sleep apnea)    Bilateral inguinal hernia without obstruction or gangrene       Current Medications:    Current Outpatient Prescriptions:     acetaminophen (TYLENOL) 650 MG TbSR, Take 1,300 mg by mouth every 8 (eight) hours., Disp: , Rfl:     aspirin (ECOTRIN) 81 MG EC tablet, Take 81 mg by mouth once daily., Disp: , Rfl:     blood sugar diagnostic (ACCU-CHEK FRANKO PLUS TEST STRP) Strp, USE  1  STRIP ONE TIME DAILY, Disp: 100 strip, Rfl: 4    celecoxib (CELEBREX) 200 MG capsule, Take 1 capsule (200 mg total) by mouth once daily., Disp: 90 capsule, Rfl: 3    clonazePAM (KLONOPIN) 0.25 MG TbDL, Take 2 tablets (0.5 mg total) by mouth nightly., Disp: 90 tablet, Rfl: 0    docusate sodium (COLACE) 100 MG capsule, Take 1 capsule (100 mg total) by mouth 2 (two) times daily as needed., Disp: 60 capsule, Rfl: 0    gabapentin (NEURONTIN) 100 MG capsule, Take 1 capsule at night for a week then increase to 2 capsules at night thereafter. Increase as tolerated, Disp: 60 capsule, Rfl: 1    glipiZIDE (GLUCOTROL) 5 MG tablet, TAKE 2 TABLETS (10 MG) TWO TIMES DAILY BEFORE MEALS, Disp: 360 tablet, Rfl: 4    lancets (ACCU-CHEK MULTICLIX LANCET) Misc, TEST ONE TIME DAILY, Disp: 100 each, Rfl: 3    losartan-hydrochlorothiazide 50-12.5 mg (HYZAAR) 50-12.5 mg per tablet, TAKE 1 TABLET ONE TIME DAILY, Disp: 90 tablet, Rfl: 4    melatonin 5 mg Cap, , Disp: , Rfl:     metformin (GLUCOPHAGE) 1000 MG tablet, TAKE 1 TABLET TWICE DAILY WITH MEALS, Disp: 180 tablet, Rfl: 4    metoprolol succinate (TOPROL-XL) 50 MG 24 hr tablet, TAKE 1 TABLET EVERY DAY, Disp: 90 tablet, Rfl: 3    oxycodone-acetaminophen (PERCOCET)  mg per tablet, Take 1 tablet by mouth every 4 (four) hours as needed., Disp: 90 tablet, Rfl: 0    oxycodone-acetaminophen (PERCOCET) 5-325 mg per tablet, Take 1 tablet by mouth every 4 (four) hours as needed for Pain., Disp: 65 tablet,  Rfl: 0    pramipexole (MIRAPEX) 0.25 MG tablet, Take 1 tablet (0.25 mg total) by mouth every evening., Disp: 30 tablet, Rfl: 5    pravastatin (PRAVACHOL) 40 MG tablet, TAKE 1 TABLET EVERY DAY, Disp: 90 tablet, Rfl: 4    senna (SENNA CONCENTRATE) 8.6 mg tablet, Take 1 tablet by mouth once daily., Disp: 60 tablet, Rfl: 0    sildenafil (VIAGRA) 100 MG tablet, Take 1 tablet (100 mg total) by mouth as needed. Take on an empty stomach, Disp: 6 tablet, Rfl: 11    OBJECTIVE:  Pain: 9/10 with walking in the morning, located in back, described as aching and pain.    Sensation:  intact to light touch     Lumbar ROM: Forward Bending   65      Backwardbending  20     Sidebend Right  35      Sidebend Left   35     Rotation Right   40     Rotation Left   40      Strength:  Gluteus Medius   3/5      Psoas    4/5     Quadriceps   4/5      Rectus Abdominis  4/5     Anterior Tibialis  4-/5     Gastrocnemius  5/5     Transverse Abdominis 3/5    Function: MODIFIED OSWESTRY LOW BACK PAIN DISABILITY QUESTIONNAIRE    The following scores are patient-reported and range from 0-5, with 0 being least impaired and 5 being most impaired.        Eval  Section 1- Pain intensity    2/5   Section 2- Person care  2/5   Section 3 Lifting- Optional  3/5     Section 4  Walking  4/5  Section 5 Sitting   3/5   Section 6 Standing  3/5  Section 7 Sleeping  4/5   Section 8 Social Life   2/5  Section 9 Traveling  2/5   Section 10 Employ/home  3/5    Patient reports 56% disability on the Modified Oswestry Low Back Pain Disability Questionnaire.  Currently [id number] and goal is [id number]    Other:  Shifts to right with right hip higher in standing anterior pelvic tilt in standing, tight hip flexors, tight adductors, unable to ER left hip to 10 degrees, IT band tightness, hamstring tightness right greater than left, QL spasm with ES spasms in lower back, piriformis guarding with spasms bilateral    ASSESSMENT:  The patient is a 76 y.o. year old male who presents  to physical therapy with complaints of back pain left hip pain and bilateral leg pain    Co-morbidities which may impact the plan of care and potentially impede the patient's progress in therapy include:  Multiple previous surgeries that were done to correct back pain; the multiple joint problems in the back and lower extremeties.    The patient's clinical presentation is evolving.  Based on patient's evolving clinical presentation, Oa, previous back surgery, HTN, DM co-morbidities, and examination of entire lower extremity and back body systems, patient presents with high complexity.    Short Term Goals:  (3 weeks)  1.  Patient will independent with HEP.  2.  Patient will increase oswestry score at least 9 points.  3.  Patient will increase flexibility.  4.  Patient will increase core strength to -4/5.    Long Term Goals:  (6 weeks)  1.  Patient will improve oswestry at least 20.  2.  Patient will decrease pain to 3/10.  3.  Patient will improve overall strength to 4/5  TREATMENT PROVIDED:      Manual Therapy:  TDN with estim and ISTM (25 minutes)    Therapeutic Exercise:  HSS supine bilateral 30 sec x 5, adductor stretch bilateral 30 sec x 5, knee to chest 10 sec x 10 right and left, supine TA 10 s x 10, quad sets 5s x 10 right and left, clams right and left 2 x 10, bridges 2 x15, prone hip extension 2 x10 right and left (25 minutes)      PLAN:  Patient will benefit from physical therapy (2) x/week for (6) weeks including manual therapy, therapeutic exercise, functional activities, modalities, and patient education.    Thank you for this referral.    These services are reasonable and necessary for the conditions set forth above while under my care.

## 2017-06-22 ENCOUNTER — CLINICAL SUPPORT (OUTPATIENT)
Dept: REHABILITATION | Facility: HOSPITAL | Age: 77
End: 2017-06-22
Attending: ANESTHESIOLOGY
Payer: MEDICARE

## 2017-06-22 DIAGNOSIS — M96.1 POSTLAMINECTOMY SYNDROME, UNSPECIFIED REGION: Primary | ICD-10-CM

## 2017-06-22 DIAGNOSIS — M12.88 TRANSIENT ARTHROPATHY, OTHER SPECIFIED SITE: ICD-10-CM

## 2017-06-22 DIAGNOSIS — M51.36 DEGENERATION OF LUMBAR INTERVERTEBRAL DISC: ICD-10-CM

## 2017-06-22 PROCEDURE — 97140 MANUAL THERAPY 1/> REGIONS: CPT

## 2017-06-22 PROCEDURE — 97110 THERAPEUTIC EXERCISES: CPT

## 2017-06-22 NOTE — PROGRESS NOTES
PHYSICAL THERAPY INITIAL OUTPATIENT EVALUATION    Referring Provider:  Dr. Luis A Diaz    Diagnosis:       ICD-10-CM ICD-9-CM    1. Postlaminectomy syndrome, unspecified region M96.1 722.80    2. Degeneration of lumbar intervertebral disc M51.36 722.52    3. Transient arthropathy, other specified site M12.88 716.48        Orders:  Evaluate and Treat    Date of Initial Evaluation:  17      Orders :  7/15/17    Coding Cycle Visit # 3    SUBJECTIVE:  Patient is a 76 year old male retired from the [employer].  Patient reports pain is biggest complaint is lower back with left quad greater than right side pain; with walking pain goes into calves bilaterally with right greater than the left;  Night pain in the knees left greater than right. Left anterior hip pain into adductor.    Daily comments:  Patient having the same complaints for night time pain.  Patient also reports improvement in function because he was able to walk on the treadmill for 20 minutes and ride his bike for 30 minutes both which he could not do before therapy.    Past Medical History:    Past Medical History:   Diagnosis Date    Anemia due to unknown mechanism 11/10/2015    Angina pectoris     not since    Arthritis     Back pain     Coronary artery disease     Diabetes mellitus 20 years     am 2014    Diabetes mellitus type II      am 2015    DM (diabetes mellitus)     BS 78 am 01/10/2017    Hyperlipemia     Hypertension     CHARLES (obstructive sleep apnea)     Spinal cord disease     Trouble in sleeping        Problem List:    Patient Active Problem List   Diagnosis    Combined hyperlipidemia associated with type 2 diabetes mellitus    REM behavioral disorder    Coronary artery disease, occlusive    Hypertension associated with diabetes    Osteoarthritis of spine with radiculopathy, lumbar region    Osteoarthritis of hip    Aortic calcification    Anemia due to unknown mechanism     Pre-operative cardiovascular examination    Type 2 diabetes mellitus without complication, without long-term current use of insulin    PLMD (periodic limb movement disorder)    CHARLES (obstructive sleep apnea)    Bilateral inguinal hernia without obstruction or gangrene       Current Medications:    Current Outpatient Prescriptions:     acetaminophen (TYLENOL) 650 MG TbSR, Take 1,300 mg by mouth every 8 (eight) hours., Disp: , Rfl:     aspirin (ECOTRIN) 81 MG EC tablet, Take 81 mg by mouth once daily., Disp: , Rfl:     blood sugar diagnostic (ACCU-CHEK FRANKO PLUS TEST STRP) Strp, USE  1  STRIP ONE TIME DAILY, Disp: 100 strip, Rfl: 4    celecoxib (CELEBREX) 200 MG capsule, Take 1 capsule (200 mg total) by mouth once daily., Disp: 90 capsule, Rfl: 3    clonazePAM (KLONOPIN) 0.25 MG TbDL, Take 2 tablets (0.5 mg total) by mouth nightly., Disp: 90 tablet, Rfl: 0    docusate sodium (COLACE) 100 MG capsule, Take 1 capsule (100 mg total) by mouth 2 (two) times daily as needed., Disp: 60 capsule, Rfl: 0    gabapentin (NEURONTIN) 100 MG capsule, Take 1 capsule at night for a week then increase to 2 capsules at night thereafter. Increase as tolerated, Disp: 60 capsule, Rfl: 1    glipiZIDE (GLUCOTROL) 5 MG tablet, TAKE 2 TABLETS (10 MG) TWO TIMES DAILY BEFORE MEALS, Disp: 360 tablet, Rfl: 4    lancets (ACCU-CHEK MULTICLIX LANCET) Misc, TEST ONE TIME DAILY, Disp: 100 each, Rfl: 3    losartan-hydrochlorothiazide 50-12.5 mg (HYZAAR) 50-12.5 mg per tablet, TAKE 1 TABLET ONE TIME DAILY, Disp: 90 tablet, Rfl: 4    melatonin 5 mg Cap, , Disp: , Rfl:     metformin (GLUCOPHAGE) 1000 MG tablet, TAKE 1 TABLET TWICE DAILY WITH MEALS, Disp: 180 tablet, Rfl: 4    metoprolol succinate (TOPROL-XL) 50 MG 24 hr tablet, TAKE 1 TABLET EVERY DAY, Disp: 90 tablet, Rfl: 3    oxycodone-acetaminophen (PERCOCET)  mg per tablet, Take 1 tablet by mouth every 4 (four) hours as needed., Disp: 90 tablet, Rfl: 0     oxycodone-acetaminophen (PERCOCET) 5-325 mg per tablet, Take 1 tablet by mouth every 4 (four) hours as needed for Pain., Disp: 65 tablet, Rfl: 0    pramipexole (MIRAPEX) 0.25 MG tablet, Take 1 tablet (0.25 mg total) by mouth every evening., Disp: 30 tablet, Rfl: 5    pravastatin (PRAVACHOL) 40 MG tablet, TAKE 1 TABLET EVERY DAY, Disp: 90 tablet, Rfl: 4    senna (SENNA CONCENTRATE) 8.6 mg tablet, Take 1 tablet by mouth once daily., Disp: 60 tablet, Rfl: 0    sildenafil (VIAGRA) 100 MG tablet, Take 1 tablet (100 mg total) by mouth as needed. Take on an empty stomach, Disp: 6 tablet, Rfl: 11    OBJECTIVE:  Pain: 9/10 with walking in the morning, located in back, described as aching and pain.    Sensation:  intact to light touch     Lumbar ROM: Forward Bending   65      Backwardbending  20     Sidebend Right  35      Sidebend Left   35     Rotation Right   40     Rotation Left   40      Strength:  Gluteus Medius   3/5      Psoas    4/5     Quadriceps   4/5      Rectus Abdominis  4/5     Anterior Tibialis  4-/5     Gastrocnemius  5/5     Transverse Abdominis 3/5    Function: MODIFIED OSWESTRY LOW BACK PAIN DISABILITY QUESTIONNAIRE    The following scores are patient-reported and range from 0-5, with 0 being least impaired and 5 being most impaired.        Eval  Section 1- Pain intensity    2/5   Section 2- Person care  2/5   Section 3 Lifting- Optional  3/5     Section 4  Walking  4/5  Section 5 Sitting   3/5   Section 6 Standing  3/5  Section 7 Sleeping  4/5   Section 8 Social Life   2/5  Section 9 Traveling  2/5   Section 10 Employ/home  3/5    Patient reports 56% disability on the Modified Oswestry Low Back Pain Disability Questionnaire.  Currently [id number] and goal is [id number]    Other:  Shifts to right with right hip higher in standing anterior pelvic tilt in standing, tight hip flexors, tight adductors, unable to ER left hip to 10 degrees, IT band tightness, hamstring tightness right greater than left, QL spasm  with ES spasms in lower back, piriformis guarding with spasms bilateral    ASSESSMENT:  The patient is a 76 y.o. year old male who presents to physical therapy with complaints of back pain left hip pain and bilateral leg pain    Co-morbidities which may impact the plan of care and potentially impede the patient's progress in therapy include:  Multiple previous surgeries that were done to correct back pain; the multiple joint problems in the back and lower extremeties.    The patient's clinical presentation is evolving.  Based on patient's evolving clinical presentation, Oa, previous back surgery, HTN, DM co-morbidities, and examination of entire lower extremity and back body systems, patient presents with high complexity.    Short Term Goals:  (3 weeks)  1.  Patient will independent with HEP.  2.  Patient will increase oswestry score at least 9 points.  3.  Patient will increase flexibility.  4.  Patient will increase core strength to -4/5.    Long Term Goals:  (6 weeks)  1.  Patient will improve oswestry at least 20.  2.  Patient will decrease pain to 3/10.  3.  Patient will improve overall strength to 4/5  TREATMENT PROVIDED:      Manual Therapy:  TDN with estim and ISTM (25 minutes)    Therapeutic Exercise:  HSS supine bilateral 30 sec x 5, adductor stretch bilateral 30 sec x 5, knee to chest 10 sec x 10 right and left, supine TA 10 s x 10, quad sets 5s x 10 right and left, clams right and left 2 x 10, bridges 2 x15, prone hip extension 2 x10 right and left (25 minutes)      PLAN:  Patient will benefit from physical therapy (2) x/week for (6) weeks including manual therapy, therapeutic exercise, functional activities, modalities, and patient education.    Thank you for this referral.    These services are reasonable and necessary for the conditions set forth above while under my care.

## 2017-06-27 ENCOUNTER — CLINICAL SUPPORT (OUTPATIENT)
Dept: REHABILITATION | Facility: HOSPITAL | Age: 77
End: 2017-06-27
Attending: ANESTHESIOLOGY
Payer: MEDICARE

## 2017-06-27 DIAGNOSIS — M12.88 TRANSIENT ARTHROPATHY, OTHER SPECIFIED SITE: ICD-10-CM

## 2017-06-27 DIAGNOSIS — M96.1 POSTLAMINECTOMY SYNDROME, UNSPECIFIED REGION: Primary | ICD-10-CM

## 2017-06-27 DIAGNOSIS — M51.36 DEGENERATION OF LUMBAR INTERVERTEBRAL DISC: ICD-10-CM

## 2017-06-27 PROCEDURE — 97110 THERAPEUTIC EXERCISES: CPT

## 2017-06-27 PROCEDURE — 97140 MANUAL THERAPY 1/> REGIONS: CPT

## 2017-06-27 NOTE — PROGRESS NOTES
PHYSICAL THERAPY INITIAL OUTPATIENT EVALUATION    Referring Provider:  Dr. Luis A Diaz    Diagnosis:       ICD-10-CM ICD-9-CM    1. Postlaminectomy syndrome, unspecified region M96.1 722.80    2. Degeneration of lumbar intervertebral disc M51.36 722.52    3. 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  Patient reports pain is biggest complaint is lower back with left quad greater than right side pain; with walking pain goes into calves bilaterally with right greater than the left;  Night pain in the knees left greater than right. Left anterior hip pain into adductor.    Daily comments:  Patient having the same complaints for night time pain.  Patient also reports improvement in function because he was able to walk on the treadmill for 20 minutes and ride his bike for 30 minutes both which he could not do before therapy.    Past Medical History:    Past Medical History:   Diagnosis Date    Anemia due to unknown mechanism 11/10/2015    Angina pectoris     not since    Arthritis     Back pain     Coronary artery disease     Diabetes mellitus 20 years     am 2014    Diabetes mellitus type II      am 2015    DM (diabetes mellitus)     BS 78 am 01/10/2017    Hyperlipemia     Hypertension     CHARLES (obstructive sleep apnea)     Spinal cord disease     Trouble in sleeping        Problem List:    Patient Active Problem List   Diagnosis    Combined hyperlipidemia associated with type 2 diabetes mellitus    REM behavioral disorder    Coronary artery disease, occlusive    Hypertension associated with diabetes    Osteoarthritis of spine with radiculopathy, lumbar region    Osteoarthritis of hip    Aortic calcification    Anemia due to unknown mechanism     Pre-operative cardiovascular examination    Type 2 diabetes mellitus without complication, without long-term current use of insulin    PLMD (periodic limb movement disorder)    CHARLES (obstructive sleep apnea)    Bilateral inguinal hernia without obstruction or gangrene       Current Medications:    Current Outpatient Prescriptions:     acetaminophen (TYLENOL) 650 MG TbSR, Take 1,300 mg by mouth every 8 (eight) hours., Disp: , Rfl:     aspirin (ECOTRIN) 81 MG EC tablet, Take 81 mg by mouth once daily., Disp: , Rfl:     blood sugar diagnostic (ACCU-CHEK FRANKO PLUS TEST STRP) Strp, USE  1  STRIP ONE TIME DAILY, Disp: 100 strip, Rfl: 4    celecoxib (CELEBREX) 200 MG capsule, Take 1 capsule (200 mg total) by mouth once daily., Disp: 90 capsule, Rfl: 3    clonazePAM (KLONOPIN) 0.25 MG TbDL, Take 2 tablets (0.5 mg total) by mouth nightly., Disp: 90 tablet, Rfl: 0    docusate sodium (COLACE) 100 MG capsule, Take 1 capsule (100 mg total) by mouth 2 (two) times daily as needed., Disp: 60 capsule, Rfl: 0    gabapentin (NEURONTIN) 100 MG capsule, Take 1 capsule at night for a week then increase to 2 capsules at night thereafter. Increase as tolerated, Disp: 60 capsule, Rfl: 1    glipiZIDE (GLUCOTROL) 5 MG tablet, TAKE 2 TABLETS (10 MG) TWO TIMES DAILY BEFORE MEALS, Disp: 360 tablet, Rfl: 4    lancets (ACCU-CHEK MULTICLIX LANCET) Misc, TEST ONE TIME DAILY, Disp: 100 each, Rfl: 3    losartan-hydrochlorothiazide 50-12.5 mg (HYZAAR) 50-12.5 mg per tablet, TAKE 1 TABLET ONE TIME DAILY, Disp: 90 tablet, Rfl: 4    melatonin 5 mg Cap, , Disp: , Rfl:     metformin (GLUCOPHAGE) 1000 MG tablet, TAKE 1 TABLET TWICE DAILY WITH MEALS, Disp: 180 tablet, Rfl: 4    metoprolol succinate (TOPROL-XL) 50 MG 24 hr tablet, TAKE 1 TABLET EVERY DAY, Disp: 90 tablet, Rfl: 3    oxycodone-acetaminophen (PERCOCET)  mg per tablet, Take 1 tablet by mouth every 4 (four) hours as needed., Disp: 90 tablet, Rfl: 0     oxycodone-acetaminophen (PERCOCET) 5-325 mg per tablet, Take 1 tablet by mouth every 4 (four) hours as needed for Pain., Disp: 65 tablet, Rfl: 0    pramipexole (MIRAPEX) 0.25 MG tablet, Take 1 tablet (0.25 mg total) by mouth every evening., Disp: 30 tablet, Rfl: 5    pravastatin (PRAVACHOL) 40 MG tablet, TAKE 1 TABLET EVERY DAY, Disp: 90 tablet, Rfl: 4    senna (SENNA CONCENTRATE) 8.6 mg tablet, Take 1 tablet by mouth once daily., Disp: 60 tablet, Rfl: 0    sildenafil (VIAGRA) 100 MG tablet, Take 1 tablet (100 mg total) by mouth as needed. Take on an empty stomach, Disp: 6 tablet, Rfl: 11    OBJECTIVE:  Pain: 9/10 with walking in the morning, located in back, described as aching and pain.    Sensation:  intact to light touch     Lumbar ROM: Forward Bending   65      Backwardbending  20     Sidebend Right  35      Sidebend Left   35     Rotation Right   40     Rotation Left   40      Strength:  Gluteus Medius   3/5      Psoas    4/5     Quadriceps   4/5      Rectus Abdominis  4/5     Anterior Tibialis  4-/5     Gastrocnemius  5/5     Transverse Abdominis 3/5    Function: MODIFIED OSWESTRY LOW BACK PAIN DISABILITY QUESTIONNAIRE    The following scores are patient-reported and range from 0-5, with 0 being least impaired and 5 being most impaired.        Eval  Section 1- Pain intensity    2/5   Section 2- Person care  2/5   Section 3 Lifting- Optional  3/5     Section 4  Walking  4/5  Section 5 Sitting   3/5   Section 6 Standing  3/5  Section 7 Sleeping  4/5   Section 8 Social Life   2/5  Section 9 Traveling  2/5   Section 10 Employ/home  3/5    Patient reports 56% disability on the Modified Oswestry Low Back Pain Disability Questionnaire.  Currently [id number] and goal is [id number]    Other:  Shifts to right with right hip higher in standing anterior pelvic tilt in standing, tight hip flexors, tight adductors, unable to ER left hip to 10 degrees, IT band tightness, hamstring tightness right greater than left, QL spasm  with ES spasms in lower back, piriformis guarding with spasms bilateral    ASSESSMENT:  The patient is a 76 y.o. year old male who presents to physical therapy with complaints of back pain left hip pain and bilateral leg pain    Co-morbidities which may impact the plan of care and potentially impede the patient's progress in therapy include:  Multiple previous surgeries that were done to correct back pain; the multiple joint problems in the back and lower extremeties.    The patient's clinical presentation is evolving.  Based on patient's evolving clinical presentation, Oa, previous back surgery, HTN, DM co-morbidities, and examination of entire lower extremity and back body systems, patient presents with high complexity.    Short Term Goals:  (3 weeks)  1.  Patient will independent with HEP.  2.  Patient will increase oswestry score at least 9 points.  3.  Patient will increase flexibility.  4.  Patient will increase core strength to -4/5.    Long Term Goals:  (6 weeks)  1.  Patient will improve oswestry at least 20.  2.  Patient will decrease pain to 3/10.  3.  Patient will improve overall strength to 4/5  TREATMENT PROVIDED:      Manual Therapy:  TDN with estim and ISTM (25 minutes)    Therapeutic Exercise:  HSS supine bilateral 30 sec x 5, adductor stretch bilateral 30 sec x 5, knee to chest 10 sec x 10 right and left, supine TA 10 s x 10, quad sets 5s x 10 right and left, clams right and left 2 x 10, bridges 2 x15, prone hip extension 2 x10 right and left (25 minutes)      PLAN:  Patient will benefit from physical therapy (2) x/week for (6) weeks including manual therapy, therapeutic exercise, functional activities, modalities, and patient education.    Thank you for this referral.    These services are reasonable and necessary for the conditions set forth above while under my care.

## 2017-06-29 ENCOUNTER — CLINICAL SUPPORT (OUTPATIENT)
Dept: REHABILITATION | Facility: HOSPITAL | Age: 77
End: 2017-06-29
Attending: ANESTHESIOLOGY
Payer: MEDICARE

## 2017-06-29 DIAGNOSIS — M51.36 DEGENERATION OF LUMBAR INTERVERTEBRAL DISC: ICD-10-CM

## 2017-06-29 DIAGNOSIS — M12.88 TRANSIENT ARTHROPATHY, OTHER SPECIFIED SITE: ICD-10-CM

## 2017-06-29 DIAGNOSIS — M96.1 POSTLAMINECTOMY SYNDROME, UNSPECIFIED REGION: Primary | ICD-10-CM

## 2017-06-29 PROCEDURE — 97140 MANUAL THERAPY 1/> REGIONS: CPT

## 2017-06-29 PROCEDURE — 97110 THERAPEUTIC EXERCISES: CPT

## 2017-06-29 NOTE — PROGRESS NOTES
PHYSICAL THERAPY INITIAL OUTPATIENT EVALUATION    Referring Provider:  Dr. Luis A Diaz    Diagnosis:       ICD-10-CM ICD-9-CM    1. Postlaminectomy syndrome, unspecified region M96.1 722.80    2. Degeneration of lumbar intervertebral disc M51.36 722.52    3. 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  Patient reports pain is biggest complaint is lower back with left quad greater than right side pain; with walking pain goes into calves bilaterally with right greater than the left;  Night pain in the knees left greater than right. Left anterior hip pain into adductor.    Daily comments:  Patient having the same complaints for night time pain in left knee and groin.    Past Medical History:    Past Medical History:   Diagnosis Date    Anemia due to unknown mechanism 11/10/2015    Angina pectoris     not since    Arthritis     Back pain     Coronary artery disease     Diabetes mellitus 20 years     am 2014    Diabetes mellitus type II      am 2015    DM (diabetes mellitus)     BS 78 am 01/10/2017    Hyperlipemia     Hypertension     CHARLES (obstructive sleep apnea)     Spinal cord disease     Trouble in sleeping        Problem List:    Patient Active Problem List   Diagnosis    Combined hyperlipidemia associated with type 2 diabetes mellitus    REM behavioral disorder    Coronary artery disease, occlusive    Hypertension associated with diabetes    Osteoarthritis of spine with radiculopathy, lumbar region    Osteoarthritis of hip    Aortic calcification    Anemia due to unknown mechanism    Pre-operative cardiovascular examination    Type 2 diabetes mellitus without complication, without long-term current use of insulin     PLMD (periodic limb movement disorder)    CHARLES (obstructive sleep apnea)    Bilateral inguinal hernia without obstruction or gangrene       Current Medications:    Current Outpatient Prescriptions:     acetaminophen (TYLENOL) 650 MG TbSR, Take 1,300 mg by mouth every 8 (eight) hours., Disp: , Rfl:     aspirin (ECOTRIN) 81 MG EC tablet, Take 81 mg by mouth once daily., Disp: , Rfl:     blood sugar diagnostic (ACCU-CHEK FRANKO PLUS TEST STRP) Strp, USE  1  STRIP ONE TIME DAILY, Disp: 100 strip, Rfl: 4    celecoxib (CELEBREX) 200 MG capsule, Take 1 capsule (200 mg total) by mouth once daily., Disp: 90 capsule, Rfl: 3    clonazePAM (KLONOPIN) 0.25 MG TbDL, Take 2 tablets (0.5 mg total) by mouth nightly., Disp: 90 tablet, Rfl: 0    docusate sodium (COLACE) 100 MG capsule, Take 1 capsule (100 mg total) by mouth 2 (two) times daily as needed., Disp: 60 capsule, Rfl: 0    gabapentin (NEURONTIN) 100 MG capsule, Take 1 capsule at night for a week then increase to 2 capsules at night thereafter. Increase as tolerated, Disp: 60 capsule, Rfl: 1    glipiZIDE (GLUCOTROL) 5 MG tablet, TAKE 2 TABLETS (10 MG) TWO TIMES DAILY BEFORE MEALS, Disp: 360 tablet, Rfl: 4    lancets (ACCU-CHEK MULTICLIX LANCET) Misc, TEST ONE TIME DAILY, Disp: 100 each, Rfl: 3    losartan-hydrochlorothiazide 50-12.5 mg (HYZAAR) 50-12.5 mg per tablet, TAKE 1 TABLET ONE TIME DAILY, Disp: 90 tablet, Rfl: 4    melatonin 5 mg Cap, , Disp: , Rfl:     metformin (GLUCOPHAGE) 1000 MG tablet, TAKE 1 TABLET TWICE DAILY WITH MEALS, Disp: 180 tablet, Rfl: 4    metoprolol succinate (TOPROL-XL) 50 MG 24 hr tablet, TAKE 1 TABLET EVERY DAY, Disp: 90 tablet, Rfl: 3    oxycodone-acetaminophen (PERCOCET)  mg per tablet, Take 1 tablet by mouth every 4 (four) hours as needed., Disp: 90 tablet, Rfl: 0    oxycodone-acetaminophen (PERCOCET) 5-325 mg per tablet, Take 1 tablet by mouth every 4 (four) hours as needed for Pain., Disp: 65 tablet, Rfl: 0     pramipexole (MIRAPEX) 0.25 MG tablet, Take 1 tablet (0.25 mg total) by mouth every evening., Disp: 30 tablet, Rfl: 5    pravastatin (PRAVACHOL) 40 MG tablet, TAKE 1 TABLET EVERY DAY, Disp: 90 tablet, Rfl: 4    senna (SENNA CONCENTRATE) 8.6 mg tablet, Take 1 tablet by mouth once daily., Disp: 60 tablet, Rfl: 0    sildenafil (VIAGRA) 100 MG tablet, Take 1 tablet (100 mg total) by mouth as needed. Take on an empty stomach, Disp: 6 tablet, Rfl: 11    OBJECTIVE:  Pain: 9/10 with walking in the morning, located in back, described as aching and pain.    Sensation:  intact to light touch     Lumbar ROM: Forward Bending   65      Backwardbending  20     Sidebend Right  35      Sidebend Left   35     Rotation Right   40     Rotation Left   40      Strength:  Gluteus Medius   3/5      Psoas    4/5     Quadriceps   4/5      Rectus Abdominis  4/5     Anterior Tibialis  4-/5     Gastrocnemius  5/5     Transverse Abdominis 3/5    Function: MODIFIED OSWESTRY LOW BACK PAIN DISABILITY QUESTIONNAIRE    The following scores are patient-reported and range from 0-5, with 0 being least impaired and 5 being most impaired.        Eval  Section 1- Pain intensity    2/5   Section 2- Person care  2/5   Section 3 Lifting- Optional  3/5     Section 4  Walking  4/5  Section 5 Sitting   3/5   Section 6 Standing  3/5  Section 7 Sleeping  4/5   Section 8 Social Life   2/5  Section 9 Traveling  2/5   Section 10 Employ/home  3/5    Patient reports 56% disability on the Modified Oswestry Low Back Pain Disability Questionnaire.  Currently [id number] and goal is [id number]    Other:  Shifts to right with right hip higher in standing anterior pelvic tilt in standing, tight hip flexors, tight adductors, unable to ER left hip to 10 degrees, IT band tightness, hamstring tightness right greater than left, QL spasm with ES spasms in lower back, piriformis guarding with spasms bilateral    ASSESSMENT:  The patient is a 76 y.o. year old male who presents to  physical therapy with complaints of back pain left hip pain and bilateral leg pain    Co-morbidities which may impact the plan of care and potentially impede the patient's progress in therapy include:  Multiple previous surgeries that were done to correct back pain; the multiple joint problems in the back and lower extremeties.    The patient's clinical presentation is evolving.  Based on patient's evolving clinical presentation, Oa, previous back surgery, HTN, DM co-morbidities, and examination of entire lower extremity and back body systems, patient presents with high complexity.    Short Term Goals:  (3 weeks)  1.  Patient will independent with HEP.  2.  Patient will increase oswestry score at least 9 points.  3.  Patient will increase flexibility.  4.  Patient will increase core strength to -4/5.    Long Term Goals:  (6 weeks)  1.  Patient will improve oswestry at least 20.  2.  Patient will decrease pain to 3/10.  3.  Patient will improve overall strength to 4/5  TREATMENT PROVIDED:      Manual Therapy:  TDN with estim and ISTM (25 minutes)    Therapeutic Exercise:  HSS supine bilateral 30 sec x 5, adductor stretch bilateral 30 sec x 5, knee to chest 10 sec x 10 right and left, supine TA 10 s x 10, quad sets 5s x 10 right and left, clams right and left 2 x 10, bridges 2 x15, prone hip extension 2 x10 right and left (25 minutes)      PLAN:  Patient will benefit from physical therapy (2) x/week for (6) weeks including manual therapy, therapeutic exercise, functional activities, modalities, and patient education.    Thank you for this referral.    These services are reasonable and necessary for the conditions set forth above while under my care.

## 2017-07-11 ENCOUNTER — HOSPITAL ENCOUNTER (OUTPATIENT)
Dept: RADIOLOGY | Facility: HOSPITAL | Age: 77
Discharge: HOME | End: 2017-07-11
Attending: ANESTHESIOLOGY
Payer: MEDICARE

## 2017-07-11 ENCOUNTER — OFFICE VISIT (OUTPATIENT)
Dept: PAIN MEDICINE | Facility: CLINIC | Age: 77
End: 2017-07-11
Payer: MEDICARE

## 2017-07-11 VITALS
HEART RATE: 72 BPM | BODY MASS INDEX: 27.35 KG/M2 | DIASTOLIC BLOOD PRESSURE: 78 MMHG | WEIGHT: 220 LBS | HEIGHT: 75 IN | SYSTOLIC BLOOD PRESSURE: 130 MMHG

## 2017-07-11 DIAGNOSIS — M17.0 PRIMARY OSTEOARTHRITIS OF BOTH KNEES: Primary | ICD-10-CM

## 2017-07-11 DIAGNOSIS — M47.816 FACET ARTHROPATHY, LUMBAR: ICD-10-CM

## 2017-07-11 DIAGNOSIS — M51.36 DDD (DEGENERATIVE DISC DISEASE), LUMBAR: ICD-10-CM

## 2017-07-11 DIAGNOSIS — M17.0 PRIMARY OSTEOARTHRITIS OF BOTH KNEES: ICD-10-CM

## 2017-07-11 DIAGNOSIS — M96.1 FAILED BACK SYNDROME OF LUMBAR SPINE: ICD-10-CM

## 2017-07-11 DIAGNOSIS — M16.12 PRIMARY OSTEOARTHRITIS OF LEFT HIP: ICD-10-CM

## 2017-07-11 PROCEDURE — 99214 OFFICE O/P EST MOD 30 MIN: CPT | Mod: S$GLB,,, | Performed by: PHYSICIAN ASSISTANT

## 2017-07-11 PROCEDURE — 73562 X-RAY EXAM OF KNEE 3: CPT | Mod: 26,50,, | Performed by: RADIOLOGY

## 2017-07-11 PROCEDURE — 73562 X-RAY EXAM OF KNEE 3: CPT | Mod: TC,50

## 2017-07-11 PROCEDURE — 99499 UNLISTED E&M SERVICE: CPT | Mod: S$GLB,,, | Performed by: PHYSICIAN ASSISTANT

## 2017-07-11 PROCEDURE — 1125F AMNT PAIN NOTED PAIN PRSNT: CPT | Mod: S$GLB,,, | Performed by: PHYSICIAN ASSISTANT

## 2017-07-11 PROCEDURE — 1159F MED LIST DOCD IN RCRD: CPT | Mod: S$GLB,,, | Performed by: PHYSICIAN ASSISTANT

## 2017-07-11 PROCEDURE — 99999 PR PBB SHADOW E&M-EST. PATIENT-LVL IV: CPT | Mod: PBBFAC,,, | Performed by: PHYSICIAN ASSISTANT

## 2017-07-11 RX ORDER — GABAPENTIN 100 MG/1
CAPSULE ORAL
Qty: 180 CAPSULE | Refills: 1 | Status: SHIPPED | OUTPATIENT
Start: 2017-07-11 | End: 2018-01-23 | Stop reason: DRUGHIGH

## 2017-07-11 RX ORDER — GABAPENTIN 100 MG/1
CAPSULE ORAL
Qty: 60 CAPSULE | Refills: 3 | Status: SHIPPED | OUTPATIENT
Start: 2017-07-11 | End: 2017-07-11 | Stop reason: SDUPTHER

## 2017-07-11 NOTE — PROGRESS NOTES
Chief Pain Complaint:  Groin pain, Left knee pain, Left Thigh/ groin pain, Bilateral leg pain (posterior)     History of Present Illness:   This patient is a 76 y.o. male who presents today complaining of the above noted pain/s. The patient describes the pain as follows.    - duration of pain: ~ 20 years   - timing: intermittent   - character: aching, sharp  - radiating, dermatomal: extends into bilateral lower extremities, crossing over dermatomes (somewhat in anterior thigh then in posterior knee area)  - antecedent trauma, prior spinal surgery: patient reports prior trauma, prior lumbar surgery (L3/4, L4/5 laminectomy/ facetectomy on 7-22-16 with Dr. Bacon)  - pertinent negatives: No fever, No chills, No weight loss, No bladder dysfunction, No bowel dysfunction, No saddle anesthesia  - pertinent positives: generalized nonspecific Lower Extremity weakness bilaterally    - aggravating factors: walking  - medications, other therapies tried (physical therapy, injections):     >> Percocet, Tylenol    >> Has previously undergone Physical Therapy - currently in PT    >> Has previously undergone spinal injection/s: TF ESIs, facet injections, hip injection (all with minimal, fleeting pain relief) with Santy Quiles Gi[facility]      Imaging / Labs / Studies (reviewed on 7/11/2017):    5/02/16 MRI Lumbar Spine Without Contrast    Narrative MRI OF THE LUMBAR SPINE WITHOUT CONTRAST.     Technique:  Sagittal T1, sagittal T2, sagittal STIR, axial T1 and axial T2 weighted images of the lumbar spine obtained without contrast.   Comparison: MRI Lumbar spine without contrast from 11/12/14   Findings:  The there is minimal (grade I) the retrolisthesis of L5 on S1.  Lumbar spinal alignment is otherwise within normal limits.  There is no evidence of acute fracture.  Vertebral body heights are maintained.  No abnormal marrow signals identified to suggest an infiltrative process.  The conus is normal in appearance and terminates  at the L1 level.  There is intervertebral disc space narrowing and desiccation of discs within the lower lumbar spine.  There findings of multilevel lumbar spondylosis as detailed below.  L1-L2: No significant central canal stenosis or neuroforaminal narrowing.  L2-L3: There is a mild broad-based disc bulge without significant central canal stenosis or neuroforaminal narrowing.   L3-L4: There is a broad-based disc bulge and bilateral facet arthropathy with associated synovial cyst arising from the left facet joint measuring approximately 5 x 9 mm, similar to the prior examination.  This results in moderate central canal stenosis, slightly progressed from the prior examination.  There is mild bilateral neuroforaminal narrowing.   L4-L5: There is a broad-based disc bulge and prominent bilateral facet arthropathy resulting in severe central canal stenosis and mild bilateral neuroforaminal narrowing.   L5-S1: There is a broad-based disc bulge and a bilateral facet arthropathy resulting in mild bilateral neuroforaminal narrowing.  No significant central canal stenosis.   The visualized intra-abdominal contents demonstrate no significant abnormalities.  The bilateral SI joints are unremarkable.  The paraspinal musculature is within normal limits.       9/03/14 X-Ray Lumbar Spine Complete 5 View    Narrative Findings:     As compared with 11/18/09, no significant interim change is identified.  Diffuse chronic changes again demonstrated as previously described.       4/26/16 X-Ray Lumbar Spine Ap Lateral w/Flex Ext    Narrative 4 views: Alignment is normal.  There is mild-moderate DJD.  No instability seen.  No fracture dislocation bone destruction seen.       5/22/13 X-Ray Cervical Spine AP And Lateral    Narrative Findings:  Changes of degenerative disk disease are present from the C3-4 level inferiorly to the C5-6 level with these most pronounced at the C5-6 level.  AP alignment is maintained with no acute fractures or  "prevertebral soft tissue swelling identified.  Lateral masses of C1 are well aligned.  Impression:  Multilevel degenerative disk disease appearing most pronounced at the C5-6 level.         Review of Systems:  CONSTITUTIONAL: patient denies any fever, chills, or weight loss  SKIN: patient denies any rash or itching  RESPIRATORY: patient reports dyspnea with exertion  GASTROINTESTINAL: patient reports constipation  GENITOURINARY: patient denies having any abnormal bladder function    MUSCULOSKELETAL:  - patient complains of the above noted pain/s (see chief pain complaint)    NEUROLOGICAL:   - pain as above  - strength in Lower extremities is decreased, BILATERALLY  - sensation in Lower extremities is abnormal, BILATERALLY  - patient denies any loss of bowel or bladder control      PSYCHIATRIC: patient denies any change in mood    Other:  All other systems reviewed and are negative      Physical Exam:  Vitals:  /78   Pulse 72   Ht 6' 3" (1.905 m)   Wt 99.8 kg (220 lb)   BMI 27.50 kg/m²    (reviewed on 7/11/2017)    General: alert and oriented, in no apparent distress  Gait: normal gait  Skin: No rashes, No discoloration, No obvious lesions  HEENT: EOMI  Cardiovascular: no significant peripheral edema present  Respiratory: respirations nonlabored    Musculoskeletal:  - Any pain on flexion, extension, rotation:    >> pain on extension and rotation  - Straight Leg Raise:     >> LEFT :: negative    >> RIGHT :: negative  - Any tenderness to palpation across paraspinal muscles, joints, bursae:     >> across lumbar paraspinals    Hip:  - Guzman's test:    >> negative on the right    >> positive on the left, limited  - Internal/external rotation:    >> pain with both maneuvers on the left, limited    Knee:  - Pain with extension + weight bearing of left knee  - Crepitus noted    Neuro:  - Extremity Strength:     >> LEFT :: 5/5    >> RIGHT :: 5/5  - Extremity Reflexes:    >> LEFT  :: 2+    >> RIGHT :: 2+  - Sensory " "(sensation to light touch):    >> LEFT :: intact    >> RIGHT :: intact     Psych:  Mood and affect is appropriate      Assessment:  Failed Back Surgery Syndrome  Lumbar Degenerative Disc Disease   Lumbar Spondylosis  Chronic Pain Syndrome    Left Hip OA  Knee OA (L>R)      Plan:  Patient returns to clinic for follow-up. He has chronic low back, hip, and knee pain. He reports that the pain "jumps around". He was previously a patient of Dr. Cedillo. He has had several injections, including TF ESIs, facet injections, and hip injection with minimal, fleeting pain relief.  - We discussed several options for treatment, including injections, which he reports he has tried them all. It also does not seem plausible to try this since he is not getting any lasting relief from any type.  - He is in PT, which he thinks helps somewhat, but his pain is not eradicated.  - Refill gabapentin 200mg QHS x 3 months.  - I offer him pain medication, but he reports he still has some Percocet. He takes so infrequently, but it does help him sleep at night. He finds that he is able to tolerate it during the day but won't take it.   - He had hernia repair on 4/18/17, but he is still having the groin pain, which appears to be due to hip OA. He was evaluated by ortho several months ago, but they wanted him to see general surgery first. The plan was to then move forward with finding hip pathology.   - Order bilateral knee x-ray and refer to ortho for evalution of left hip + left knee pain.   - Over 50% of the time was spent counseling/educating the patient. Total time spent with the patient was greater than 25 minutes.  RTC PRN. I discussed the risks, benefits, and alternatives to potential treatment options. All questions and concerns were fully addressed today in clinic. Dr. Diaz was consulted regarding the patient plan and agrees.              >>Pain Disability Index:  3/28/2017 :: 30  7/11/2017 :: 39    >>Opioid Risk Tool:  3/28/2017 :: 0  "

## 2017-07-18 ENCOUNTER — CLINICAL SUPPORT (OUTPATIENT)
Dept: REHABILITATION | Facility: HOSPITAL | Age: 77
End: 2017-07-18
Attending: ANESTHESIOLOGY
Payer: MEDICARE

## 2017-07-18 DIAGNOSIS — M51.36 DEGENERATION OF LUMBAR INTERVERTEBRAL DISC: ICD-10-CM

## 2017-07-18 DIAGNOSIS — M96.1 POSTLAMINECTOMY SYNDROME, UNSPECIFIED REGION: Primary | ICD-10-CM

## 2017-07-18 DIAGNOSIS — M12.88 TRANSIENT ARTHROPATHY, OTHER SPECIFIED SITE: ICD-10-CM

## 2017-07-18 PROCEDURE — 97140 MANUAL THERAPY 1/> REGIONS: CPT

## 2017-07-18 PROCEDURE — 97110 THERAPEUTIC EXERCISES: CPT

## 2017-07-18 NOTE — PROGRESS NOTES
PHYSICAL THERAPY INITIAL OUTPATIENT EVALUATION    Referring Provider:  Dr. Luis A Diaz    Diagnosis:       ICD-10-CM ICD-9-CM    1. Postlaminectomy syndrome, unspecified region M96.1 722.80    2. Degeneration of lumbar intervertebral disc M51.36 722.52    3. 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  Patient reports pain is biggest complaint is lower back with left quad greater than right side pain; with walking pain goes into calves bilaterally with right greater than the left;  Night pain in the knees left greater than right. Left anterior hip pain into adductor.    Daily comments:  Patient having the same complaints for night time pain in left knee and groin.    Past Medical History:    Past Medical History:   Diagnosis Date    Anemia due to unknown mechanism 11/10/2015    Angina pectoris     not since    Arthritis     Back pain     Coronary artery disease     Diabetes mellitus 20 years     am 2014    Diabetes mellitus type II      am 2015    DM (diabetes mellitus)     BS 78 am 01/10/2017    Hyperlipemia     Hypertension     CHARLES (obstructive sleep apnea)     Spinal cord disease     Trouble in sleeping        Problem List:    Patient Active Problem List   Diagnosis    Combined hyperlipidemia associated with type 2 diabetes mellitus    REM behavioral disorder    Coronary artery disease, occlusive    Hypertension associated with diabetes    Osteoarthritis of spine with radiculopathy, lumbar region    Osteoarthritis of hip    Aortic calcification    Anemia due to unknown mechanism    Pre-operative cardiovascular examination    Type 2 diabetes mellitus without complication, without long-term current use of insulin     PLMD (periodic limb movement disorder)    CHARLES (obstructive sleep apnea)    Bilateral inguinal hernia without obstruction or gangrene       Current Medications:    Current Outpatient Prescriptions:     acetaminophen (TYLENOL) 650 MG TbSR, Take 1,300 mg by mouth every 8 (eight) hours., Disp: , Rfl:     aspirin (ECOTRIN) 81 MG EC tablet, Take 81 mg by mouth once daily., Disp: , Rfl:     blood sugar diagnostic (ACCU-CHEK FRANKO PLUS TEST STRP) Strp, USE  1  STRIP ONE TIME DAILY, Disp: 100 strip, Rfl: 4    celecoxib (CELEBREX) 200 MG capsule, Take 1 capsule (200 mg total) by mouth once daily., Disp: 90 capsule, Rfl: 3    clonazePAM (KLONOPIN) 0.25 MG TbDL, Take 2 tablets (0.5 mg total) by mouth nightly., Disp: 90 tablet, Rfl: 0    docusate sodium (COLACE) 100 MG capsule, Take 1 capsule (100 mg total) by mouth 2 (two) times daily as needed., Disp: 60 capsule, Rfl: 0    gabapentin (NEURONTIN) 100 MG capsule, TAKE 1 CAPSULE BY MOUTH EVERY NIGHT AT BEDTIME FOR 1 WEEK, THEN INCREASE TO 2 CAPSULES EVERY NIGHT AT BEDTIME THEREAFTER, Disp: 180 capsule, Rfl: 1    glipiZIDE (GLUCOTROL) 5 MG tablet, TAKE 2 TABLETS (10 MG) TWO TIMES DAILY BEFORE MEALS, Disp: 360 tablet, Rfl: 4    lancets (ACCU-CHEK MULTICLIX LANCET) Misc, TEST ONE TIME DAILY, Disp: 100 each, Rfl: 3    losartan-hydrochlorothiazide 50-12.5 mg (HYZAAR) 50-12.5 mg per tablet, TAKE 1 TABLET ONE TIME DAILY, Disp: 90 tablet, Rfl: 4    melatonin 5 mg Cap, , Disp: , Rfl:     metformin (GLUCOPHAGE) 1000 MG tablet, TAKE 1 TABLET TWICE DAILY WITH MEALS, Disp: 180 tablet, Rfl: 4    metoprolol succinate (TOPROL-XL) 50 MG 24 hr tablet, TAKE 1 TABLET EVERY DAY, Disp: 90 tablet, Rfl: 3    oxycodone-acetaminophen (PERCOCET)  mg per tablet, Take 1 tablet by mouth every 4 (four) hours as needed., Disp: 90 tablet, Rfl: 0    oxycodone-acetaminophen (PERCOCET) 5-325 mg per tablet, Take 1 tablet by mouth every 4 (four) hours as needed for Pain., Disp: 65 tablet, Rfl:  0    pramipexole (MIRAPEX) 0.25 MG tablet, Take 1 tablet (0.25 mg total) by mouth every evening., Disp: 30 tablet, Rfl: 5    pravastatin (PRAVACHOL) 40 MG tablet, TAKE 1 TABLET EVERY DAY, Disp: 90 tablet, Rfl: 4    senna (SENNA CONCENTRATE) 8.6 mg tablet, Take 1 tablet by mouth once daily., Disp: 60 tablet, Rfl: 0    sildenafil (VIAGRA) 100 MG tablet, Take 1 tablet (100 mg total) by mouth as needed. Take on an empty stomach, Disp: 6 tablet, Rfl: 11    OBJECTIVE:  Pain: 9/10 with walking in the morning, located in back, described as aching and pain.    Sensation:  intact to light touch     Lumbar ROM: Forward Bending   65      Backwardbending  20     Sidebend Right  35      Sidebend Left   35     Rotation Right   40     Rotation Left   40      Strength:  Gluteus Medius   3/5      Psoas    4/5     Quadriceps   4/5      Rectus Abdominis  4/5     Anterior Tibialis  4-/5     Gastrocnemius  5/5     Transverse Abdominis 3/5    Function: MODIFIED OSWESTRY LOW BACK PAIN DISABILITY QUESTIONNAIRE    The following scores are patient-reported and range from 0-5, with 0 being least impaired and 5 being most impaired.        Eval  Section 1- Pain intensity    2/5   Section 2- Person care  2/5   Section 3 Lifting- Optional  3/5     Section 4  Walking  4/5  Section 5 Sitting   3/5   Section 6 Standing  3/5  Section 7 Sleeping  4/5   Section 8 Social Life   2/5  Section 9 Traveling  2/5   Section 10 Employ/home  3/5    Patient reports 56% disability on the Modified Oswestry Low Back Pain Disability Questionnaire.  Currently [id number] and goal is [id number]    Other:  Shifts to right with right hip higher in standing anterior pelvic tilt in standing, tight hip flexors, tight adductors, unable to ER left hip to 10 degrees, IT band tightness, hamstring tightness right greater than left, QL spasm with ES spasms in lower back, piriformis guarding with spasms bilateral    ASSESSMENT:  The patient is a 76 y.o. year old male who presents to  physical therapy with complaints of back pain left hip pain and bilateral leg pain    Co-morbidities which may impact the plan of care and potentially impede the patient's progress in therapy include:  Multiple previous surgeries that were done to correct back pain; the multiple joint problems in the back and lower extremeties.    The patient's clinical presentation is evolving.  Based on patient's evolving clinical presentation, Oa, previous back surgery, HTN, DM co-morbidities, and examination of entire lower extremity and back body systems, patient presents with high complexity.    Short Term Goals:  (3 weeks)  1.  Patient will independent with HEP.  2.  Patient will increase oswestry score at least 9 points.  3.  Patient will increase flexibility.  4.  Patient will increase core strength to -4/5.    Long Term Goals:  (6 weeks)  1.  Patient will improve oswestry at least 20.  2.  Patient will decrease pain to 3/10.  3.  Patient will improve overall strength to 4/5  TREATMENT PROVIDED:      Manual Therapy: LE distraction(15 minutes)    Therapeutic Exercise:  HSS supine bilateral 30 sec x 5, adductor stretch bilateral 30 sec x 5, knee to chest 10 sec x 10 right and left, supine TA 10 s x 10, quad sets 5s x 10 right and left, clams right and left 2 x 10, bridges 2 x15, prone hip extension 2 x10 right and left shutlle bilateral squats 2 x 15 6 bands, bilateral calf rasies 2 x 15 6 bands hip hikes 2 x 10 each(30 minutes)      PLAN:  Patient will benefit from physical therapy (2) x/week for (6) weeks including manual therapy, therapeutic exercise, functional activities, modalities, and patient education.    Thank you for this referral.    These services are reasonable and necessary for the conditions set forth above while under my care.

## 2017-07-20 ENCOUNTER — CLINICAL SUPPORT (OUTPATIENT)
Dept: REHABILITATION | Facility: HOSPITAL | Age: 77
End: 2017-07-20
Attending: ANESTHESIOLOGY
Payer: MEDICARE

## 2017-07-20 DIAGNOSIS — M96.1 POSTLAMINECTOMY SYNDROME, UNSPECIFIED REGION: Primary | ICD-10-CM

## 2017-07-20 DIAGNOSIS — M51.36 DEGENERATION OF LUMBAR INTERVERTEBRAL DISC: ICD-10-CM

## 2017-07-20 DIAGNOSIS — M12.88 TRANSIENT ARTHROPATHY, OTHER SPECIFIED SITE: ICD-10-CM

## 2017-07-20 PROCEDURE — 97110 THERAPEUTIC EXERCISES: CPT

## 2017-07-20 NOTE — PROGRESS NOTES
PHYSICAL THERAPY INITIAL OUTPATIENT EVALUATION    Referring Provider:  Dr. Luis A Diaz    Diagnosis:       ICD-10-CM ICD-9-CM    1. Postlaminectomy syndrome, unspecified region M96.1 722.80    2. Degeneration of lumbar intervertebral disc M51.36 722.52    3. 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  Patient reports pain is biggest complaint is lower back with left quad greater than right side pain; with walking pain goes into calves bilaterally with right greater than the left;  Night pain in the knees left greater than right. Left anterior hip pain into adductor.    Daily comments:  Patient having the same complaints for night time pain in left knee and groin.    Past Medical History:    Past Medical History:   Diagnosis Date    Anemia due to unknown mechanism 11/10/2015    Angina pectoris     not since    Arthritis     Back pain     Coronary artery disease     Diabetes mellitus 20 years     am 2014    Diabetes mellitus type II      am 2015    DM (diabetes mellitus)     BS 78 am 01/10/2017    Hyperlipemia     Hypertension     CHARLES (obstructive sleep apnea)     Spinal cord disease     Trouble in sleeping        Problem List:    Patient Active Problem List   Diagnosis    Combined hyperlipidemia associated with type 2 diabetes mellitus    REM behavioral disorder    Coronary artery disease, occlusive    Hypertension associated with diabetes    Osteoarthritis of spine with radiculopathy, lumbar region    Osteoarthritis of hip    Aortic calcification    Anemia due to unknown mechanism    Pre-operative cardiovascular examination    Type 2 diabetes mellitus without complication, without long-term current use of insulin     PLMD (periodic limb movement disorder)    CHARLES (obstructive sleep apnea)    Bilateral inguinal hernia without obstruction or gangrene       Current Medications:    Current Outpatient Prescriptions:     acetaminophen (TYLENOL) 650 MG TbSR, Take 1,300 mg by mouth every 8 (eight) hours., Disp: , Rfl:     aspirin (ECOTRIN) 81 MG EC tablet, Take 81 mg by mouth once daily., Disp: , Rfl:     blood sugar diagnostic (ACCU-CHEK FRANKO PLUS TEST STRP) Strp, USE  1  STRIP ONE TIME DAILY, Disp: 100 strip, Rfl: 4    celecoxib (CELEBREX) 200 MG capsule, Take 1 capsule (200 mg total) by mouth once daily., Disp: 90 capsule, Rfl: 3    clonazePAM (KLONOPIN) 0.25 MG TbDL, Take 2 tablets (0.5 mg total) by mouth nightly., Disp: 90 tablet, Rfl: 0    docusate sodium (COLACE) 100 MG capsule, Take 1 capsule (100 mg total) by mouth 2 (two) times daily as needed., Disp: 60 capsule, Rfl: 0    gabapentin (NEURONTIN) 100 MG capsule, TAKE 1 CAPSULE BY MOUTH EVERY NIGHT AT BEDTIME FOR 1 WEEK, THEN INCREASE TO 2 CAPSULES EVERY NIGHT AT BEDTIME THEREAFTER, Disp: 180 capsule, Rfl: 1    glipiZIDE (GLUCOTROL) 5 MG tablet, TAKE 2 TABLETS (10 MG) TWO TIMES DAILY BEFORE MEALS, Disp: 360 tablet, Rfl: 4    lancets (ACCU-CHEK MULTICLIX LANCET) Misc, TEST ONE TIME DAILY, Disp: 100 each, Rfl: 3    losartan-hydrochlorothiazide 50-12.5 mg (HYZAAR) 50-12.5 mg per tablet, TAKE 1 TABLET ONE TIME DAILY, Disp: 90 tablet, Rfl: 4    melatonin 5 mg Cap, , Disp: , Rfl:     metformin (GLUCOPHAGE) 1000 MG tablet, TAKE 1 TABLET TWICE DAILY WITH MEALS, Disp: 180 tablet, Rfl: 4    metoprolol succinate (TOPROL-XL) 50 MG 24 hr tablet, TAKE 1 TABLET EVERY DAY, Disp: 90 tablet, Rfl: 3    oxycodone-acetaminophen (PERCOCET)  mg per tablet, Take 1 tablet by mouth every 4 (four) hours as needed., Disp: 90 tablet, Rfl: 0    oxycodone-acetaminophen (PERCOCET) 5-325 mg per tablet, Take 1 tablet by mouth every 4 (four) hours as needed for Pain., Disp: 65 tablet, Rfl:  0    pramipexole (MIRAPEX) 0.25 MG tablet, Take 1 tablet (0.25 mg total) by mouth every evening., Disp: 30 tablet, Rfl: 5    pravastatin (PRAVACHOL) 40 MG tablet, TAKE 1 TABLET EVERY DAY, Disp: 90 tablet, Rfl: 4    senna (SENNA CONCENTRATE) 8.6 mg tablet, Take 1 tablet by mouth once daily., Disp: 60 tablet, Rfl: 0    sildenafil (VIAGRA) 100 MG tablet, Take 1 tablet (100 mg total) by mouth as needed. Take on an empty stomach, Disp: 6 tablet, Rfl: 11    OBJECTIVE:  Pain: 9/10 with walking in the morning, located in back, described as aching and pain.    Sensation:  intact to light touch     Lumbar ROM: Forward Bending   65      Backwardbending  20     Sidebend Right  35      Sidebend Left   35     Rotation Right   40     Rotation Left   40      Strength:  Gluteus Medius   3/5      Psoas    4/5     Quadriceps   4/5      Rectus Abdominis  4/5     Anterior Tibialis  4-/5     Gastrocnemius  5/5     Transverse Abdominis 3/5    Function: MODIFIED OSWESTRY LOW BACK PAIN DISABILITY QUESTIONNAIRE    The following scores are patient-reported and range from 0-5, with 0 being least impaired and 5 being most impaired.        Eval  Section 1- Pain intensity    2/5   Section 2- Person care  2/5   Section 3 Lifting- Optional  3/5     Section 4  Walking  4/5  Section 5 Sitting   3/5   Section 6 Standing  3/5  Section 7 Sleeping  4/5   Section 8 Social Life   2/5  Section 9 Traveling  2/5   Section 10 Employ/home  3/5    Patient reports 56% disability on the Modified Oswestry Low Back Pain Disability Questionnaire.  Currently [id number] and goal is [id number]    Other:  Shifts to right with right hip higher in standing anterior pelvic tilt in standing, tight hip flexors, tight adductors, unable to ER left hip to 10 degrees, IT band tightness, hamstring tightness right greater than left, QL spasm with ES spasms in lower back, piriformis guarding with spasms bilateral    ASSESSMENT:  The patient is a 76 y.o. year old male who presents to  physical therapy with complaints of back pain left hip pain and bilateral leg pain    Co-morbidities which may impact the plan of care and potentially impede the patient's progress in therapy include:  Multiple previous surgeries that were done to correct back pain; the multiple joint problems in the back and lower extremeties.    The patient's clinical presentation is evolving.  Based on patient's evolving clinical presentation, Oa, previous back surgery, HTN, DM co-morbidities, and examination of entire lower extremity and back body systems, patient presents with high complexity.    Short Term Goals:  (3 weeks)  1.  Patient will independent with HEP.  2.  Patient will increase oswestry score at least 9 points.  3.  Patient will increase flexibility.  4.  Patient will increase core strength to -4/5.    Long Term Goals:  (6 weeks)  1.  Patient will improve oswestry at least 20.  2.  Patient will decrease pain to 3/10.  3.  Patient will improve overall strength to 4/5  TREATMENT PROVIDED:      Manual Therapy: LE distraction(15 minutes)    Therapeutic Exercise:  HSS supine bilateral 30 sec x 5, adductor stretch bilateral 30 sec x 5, knee to chest 10 sec x 10 right and left, supine TA 10 s x 10, quad sets 5s x 10 right and left, clams right and left 2 x 10, bridges 2 x15, prone hip extension 2 x10 right and left shutlle bilateral squats 2 x 15 6 bands, bilateral calf rasies 2 x 15 6 bands hip hikes 2 x 10 each(30 minutes)      PLAN:  Patient will benefit from physical therapy (2) x/week for (6) weeks including manual therapy, therapeutic exercise, functional activities, modalities, and patient education.    Thank you for this referral.    These services are reasonable and necessary for the conditions set forth above while under my care.

## 2017-07-24 ENCOUNTER — OFFICE VISIT (OUTPATIENT)
Dept: INTERNAL MEDICINE | Facility: CLINIC | Age: 77
End: 2017-07-24
Payer: MEDICARE

## 2017-07-24 VITALS
TEMPERATURE: 95 F | WEIGHT: 224.63 LBS | SYSTOLIC BLOOD PRESSURE: 126 MMHG | DIASTOLIC BLOOD PRESSURE: 64 MMHG | HEIGHT: 75 IN | HEART RATE: 68 BPM | BODY MASS INDEX: 27.93 KG/M2

## 2017-07-24 DIAGNOSIS — M16.12 PRIMARY OSTEOARTHRITIS OF LEFT HIP: Chronic | ICD-10-CM

## 2017-07-24 DIAGNOSIS — I70.0 AORTIC CALCIFICATION: Chronic | ICD-10-CM

## 2017-07-24 DIAGNOSIS — E78.2 COMBINED HYPERLIPIDEMIA ASSOCIATED WITH TYPE 2 DIABETES MELLITUS: Chronic | ICD-10-CM

## 2017-07-24 DIAGNOSIS — D64.9 ANEMIA DUE TO UNKNOWN MECHANISM: ICD-10-CM

## 2017-07-24 DIAGNOSIS — E11.59 HYPERTENSION ASSOCIATED WITH DIABETES: Chronic | ICD-10-CM

## 2017-07-24 DIAGNOSIS — G47.33 OSA (OBSTRUCTIVE SLEEP APNEA): ICD-10-CM

## 2017-07-24 DIAGNOSIS — E11.42 TYPE 2 DIABETES MELLITUS WITH DIABETIC POLYNEUROPATHY, WITHOUT LONG-TERM CURRENT USE OF INSULIN: ICD-10-CM

## 2017-07-24 DIAGNOSIS — G47.61 PLMD (PERIODIC LIMB MOVEMENT DISORDER): ICD-10-CM

## 2017-07-24 DIAGNOSIS — I25.10 CORONARY ARTERY DISEASE, OCCLUSIVE: Chronic | ICD-10-CM

## 2017-07-24 DIAGNOSIS — G47.52 REM BEHAVIORAL DISORDER: Chronic | ICD-10-CM

## 2017-07-24 DIAGNOSIS — E11.69 COMBINED HYPERLIPIDEMIA ASSOCIATED WITH TYPE 2 DIABETES MELLITUS: Chronic | ICD-10-CM

## 2017-07-24 DIAGNOSIS — I15.2 HYPERTENSION ASSOCIATED WITH DIABETES: Chronic | ICD-10-CM

## 2017-07-24 PROBLEM — K40.20 BILATERAL INGUINAL HERNIA WITHOUT OBSTRUCTION OR GANGRENE: Status: RESOLVED | Noted: 2017-04-18 | Resolved: 2017-07-24

## 2017-07-24 PROCEDURE — 99499 UNLISTED E&M SERVICE: CPT | Mod: S$GLB,,, | Performed by: PHYSICIAN ASSISTANT

## 2017-07-24 PROCEDURE — G0439 PPPS, SUBSEQ VISIT: HCPCS | Mod: S$GLB,,, | Performed by: PHYSICIAN ASSISTANT

## 2017-07-24 PROCEDURE — 99999 PR PBB SHADOW E&M-EST. PATIENT-LVL V: CPT | Mod: PBBFAC,,, | Performed by: PHYSICIAN ASSISTANT

## 2017-07-24 NOTE — PROGRESS NOTES
"Srinath Mcknight presented for a  Medicare AWV and comprehensive Health Risk Assessment today. The following components were reviewed and updated:    · Medical history  · Family History  · Social history  · Allergies and Current Medications  · Health Risk Assessment  · Health Maintenance  · Care Team     ** See Completed Assessments for Annual Wellness Visit within the encounter summary.**       The following assessments were completed:  · Living Situation  · CAGE  · Depression Screening  · Timed Get Up and Go  · Whisper Test  · Cognitive Function Screening  · Nutrition Screening  · ADL Screening  · PAQ Screening    Vitals:    07/24/17 1059   BP: 126/64   Pulse: 68   Temp: (!) 95.2 °F (35.1 °C)   TempSrc: Tympanic   Weight: 101.9 kg (224 lb 10.4 oz)   Height: 6' 3" (1.905 m)     Body mass index is 28.08 kg/m².     Physical Exam   Constitutional: He is oriented to person, place, and time. He appears well-developed and well-nourished.   HENT:   Head: Normocephalic and atraumatic.   Right Ear: External ear normal.   Left Ear: External ear normal.   Nose: Nose normal.   Mouth/Throat: Oropharynx is clear and moist.   Eyes: Conjunctivae and EOM are normal. Pupils are equal, round, and reactive to light.   Neck: Normal range of motion. Neck supple.   Cardiovascular: Normal rate, regular rhythm and normal heart sounds.  Exam reveals no gallop and no friction rub.    No murmur heard.  Pulmonary/Chest: Effort normal and breath sounds normal. No respiratory distress. He has no wheezes. He has no rales. He exhibits no tenderness.   Abdominal: Soft. He exhibits no distension. There is no tenderness.   Musculoskeletal: Normal range of motion.   Lymphadenopathy:     He has no cervical adenopathy.   Neurological: He is alert and oriented to person, place, and time.   Skin: Skin is warm and dry.   Psychiatric: He has a normal mood and affect. His behavior is normal. Judgment and thought content normal.   Vitals reviewed.    "     Diagnoses and health risks identified today and associated recommendations/orders:    1. Type 2 diabetes mellitus with polyneuropathy, without long-term current use of insulin  -last A1C 6 on 3/7/2017. Stable on glipizide 5mg, metformin 1000 BID  -podiatrist: due 12/2017  -ophthalmologist seen this year in [place]. No DM retinopathy.     2. Hypertension associated with diabetes  -stable. Within normal range    3. Primary osteoarthritis of left hip (11/18/2016)  -degenerative joint disease. Scheduled to see Dr. Alcocer in August. Celebrex, tylenol, for pain and percocet very rarely     4. CHARLES (obstructive sleep apnea)  -mild. Sleep study 3/9/2017   AHI was 6.1/hr ( 38 events)  Borderline CHARLES   Declines interventions     5. PLMD (periodic limb movement disorder)  --gabapentin 100-200mg nightly  -mirapex 0.25 nightly. Does well on this.     6. REM behavioral disorder  -Klonopin, melatonin    7. Combined hyperlipidemia associated with type 2 diabetes mellitus  -last lipids 3/7/2017 ok. Total cholesterol 142. On statin (pravastatin 40mg daily)    8. Aortic calcification  -last lipids 3/7/2017 ok. Total cholesterol 142. On statin (pravastatin 40mg daily). Denies any chest pain or shortness of breath.     9. Anemia due to unknown mechanism  -stable. Very mild.       Provided [place] with a 5-10 year written screening schedule and personal prevention plan. Recommendations were developed using the USPSTF age appropriate recommendations. Education, counseling, and referrals were provided as needed. After Visit Summary printed and given to patient which includes a list of additional screenings\tests needed.    Return in about 1 year (around 7/24/2018), or if symptoms worsen or fail to improve, for HRA.    Dyan Manuel PA-C

## 2017-07-25 ENCOUNTER — CLINICAL SUPPORT (OUTPATIENT)
Dept: REHABILITATION | Facility: HOSPITAL | Age: 77
End: 2017-07-25
Attending: ANESTHESIOLOGY
Payer: MEDICARE

## 2017-07-25 DIAGNOSIS — M96.1 POSTLAMINECTOMY SYNDROME, UNSPECIFIED REGION: Primary | ICD-10-CM

## 2017-07-25 DIAGNOSIS — M12.88 TRANSIENT ARTHROPATHY, OTHER SPECIFIED SITE: ICD-10-CM

## 2017-07-25 DIAGNOSIS — M51.36 DEGENERATION OF LUMBAR INTERVERTEBRAL DISC: ICD-10-CM

## 2017-07-25 PROCEDURE — 97110 THERAPEUTIC EXERCISES: CPT

## 2017-07-25 NOTE — PROGRESS NOTES
PHYSICAL THERAPY INITIAL OUTPATIENT EVALUATION    Referring Provider:  Dr. Luis A Diaz    Diagnosis:       ICD-10-CM ICD-9-CM    1. Postlaminectomy syndrome, unspecified region M96.1 722.80    2. Degeneration of lumbar intervertebral disc M51.36 722.52    3. 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  Patient reports pain is biggest complaint is lower back with left quad greater than right side pain; with walking pain goes into calves bilaterally with right greater than the left;  Night pain in the knees left greater than right. Left anterior hip pain into adductor.    Daily comments:  Patient having the same complaints for night time pain in left knee and groin.    Past Medical History:    Past Medical History:   Diagnosis Date    Anemia due to unknown mechanism 11/10/2015    Angina pectoris     not since    Arthritis     Back pain     Coronary artery disease     Diabetes mellitus 20 years     am 2014    Diabetes mellitus type II      am 2015    DM (diabetes mellitus)     BS 78 am 01/10/2017    Hyperlipemia     Hypertension     CHARLES (obstructive sleep apnea)     Spinal cord disease     Trouble in sleeping        Problem List:    Patient Active Problem List   Diagnosis    Combined hyperlipidemia associated with type 2 diabetes mellitus    REM behavioral disorder    Coronary artery disease, occlusive    Hypertension associated with diabetes    Osteoarthritis of spine with radiculopathy, lumbar region    Osteoarthritis of hip    Aortic calcification    Anemia due to unknown mechanism    Type 2 diabetes mellitus with diabetic polyneuropathy, without long-term current use of insulin    PLMD (periodic limb movement disorder)     CHARLES (obstructive sleep apnea)       Current Medications:    Current Outpatient Prescriptions:     acetaminophen (TYLENOL) 650 MG TbSR, Take 1,300 mg by mouth every 8 (eight) hours., Disp: , Rfl:     aspirin (ECOTRIN) 81 MG EC tablet, Take 81 mg by mouth once daily., Disp: , Rfl:     blood sugar diagnostic (ACCU-CHEK FRANKO PLUS TEST STRP) Strp, USE  1  STRIP ONE TIME DAILY, Disp: 100 strip, Rfl: 4    celecoxib (CELEBREX) 200 MG capsule, Take 1 capsule (200 mg total) by mouth once daily., Disp: 90 capsule, Rfl: 3    clonazePAM (KLONOPIN) 0.25 MG TbDL, Take 2 tablets (0.5 mg total) by mouth nightly., Disp: 90 tablet, Rfl: 0    docusate sodium (COLACE) 100 MG capsule, Take 1 capsule (100 mg total) by mouth 2 (two) times daily as needed., Disp: 60 capsule, Rfl: 0    gabapentin (NEURONTIN) 100 MG capsule, TAKE 1 CAPSULE BY MOUTH EVERY NIGHT AT BEDTIME FOR 1 WEEK, THEN INCREASE TO 2 CAPSULES EVERY NIGHT AT BEDTIME THEREAFTER, Disp: 180 capsule, Rfl: 1    glipiZIDE (GLUCOTROL) 5 MG tablet, TAKE 2 TABLETS (10 MG) TWO TIMES DAILY BEFORE MEALS, Disp: 360 tablet, Rfl: 4    lancets (ACCU-CHEK MULTICLIX LANCET) Misc, TEST ONE TIME DAILY, Disp: 100 each, Rfl: 3    losartan-hydrochlorothiazide 50-12.5 mg (HYZAAR) 50-12.5 mg per tablet, TAKE 1 TABLET ONE TIME DAILY, Disp: 90 tablet, Rfl: 4    melatonin 5 mg Cap, , Disp: , Rfl:     metformin (GLUCOPHAGE) 1000 MG tablet, TAKE 1 TABLET TWICE DAILY WITH MEALS, Disp: 180 tablet, Rfl: 4    metoprolol succinate (TOPROL-XL) 50 MG 24 hr tablet, TAKE 1 TABLET EVERY DAY, Disp: 90 tablet, Rfl: 3    oxycodone-acetaminophen (PERCOCET)  mg per tablet, Take 1 tablet by mouth every 4 (four) hours as needed., Disp: 90 tablet, Rfl: 0    oxycodone-acetaminophen (PERCOCET) 5-325 mg per tablet, Take 1 tablet by mouth every 4 (four) hours as needed for Pain., Disp: 65 tablet, Rfl: 0    pramipexole (MIRAPEX) 0.25 MG tablet, Take 1 tablet (0.25 mg total) by mouth every evening.,  Disp: 30 tablet, Rfl: 5    pravastatin (PRAVACHOL) 40 MG tablet, TAKE 1 TABLET EVERY DAY, Disp: 90 tablet, Rfl: 4    senna (SENNA CONCENTRATE) 8.6 mg tablet, Take 1 tablet by mouth once daily., Disp: 60 tablet, Rfl: 0    sildenafil (VIAGRA) 100 MG tablet, Take 1 tablet (100 mg total) by mouth as needed. Take on an empty stomach, Disp: 6 tablet, Rfl: 11    OBJECTIVE:  Pain: 9/10 with walking in the morning, located in back, described as aching and pain.    Sensation:  intact to light touch     Lumbar ROM: Forward Bending   65      Backwardbending  20     Sidebend Right  35      Sidebend Left   35     Rotation Right   40     Rotation Left   40      Strength:  Gluteus Medius   3/5      Psoas    4/5     Quadriceps   4/5      Rectus Abdominis  4/5     Anterior Tibialis  4-/5     Gastrocnemius  5/5     Transverse Abdominis 3/5    Function: MODIFIED OSWESTRY LOW BACK PAIN DISABILITY QUESTIONNAIRE    The following scores are patient-reported and range from 0-5, with 0 being least impaired and 5 being most impaired.        Eval  Section 1- Pain intensity    2/5   Section 2- Person care  2/5   Section 3 Lifting- Optional  3/5     Section 4  Walking  4/5  Section 5 Sitting   3/5   Section 6 Standing  3/5  Section 7 Sleeping  4/5   Section 8 Social Life   2/5  Section 9 Traveling  2/5   Section 10 Employ/home  3/5    Patient reports 56% disability on the Modified Oswestry Low Back Pain Disability Questionnaire.  Currently [id number] and goal is [id number]    Other:  Shifts to right with right hip higher in standing anterior pelvic tilt in standing, tight hip flexors, tight adductors, unable to ER left hip to 10 degrees, IT band tightness, hamstring tightness right greater than left, QL spasm with ES spasms in lower back, piriformis guarding with spasms bilateral    ASSESSMENT:  The patient is a 76 y.o. year old male who presents to physical therapy with complaints of back pain left hip pain and bilateral leg pain    Co-morbidities  which may impact the plan of care and potentially impede the patient's progress in therapy include:  Multiple previous surgeries that were done to correct back pain; the multiple joint problems in the back and lower extremeties.    The patient's clinical presentation is evolving.  Based on patient's evolving clinical presentation, Oa, previous back surgery, HTN, DM co-morbidities, and examination of entire lower extremity and back body systems, patient presents with high complexity.    Short Term Goals:  (3 weeks)  1.  Patient will independent with HEP.  2.  Patient will increase oswestry score at least 9 points.  3.  Patient will increase flexibility.  4.  Patient will increase core strength to -4/5.    Long Term Goals:  (6 weeks)  1.  Patient will improve oswestry at least 20.  2.  Patient will decrease pain to 3/10.  3.  Patient will improve overall strength to 4/5  TREATMENT PROVIDED:      Manual Therapy: LE distraction(NT)    Therapeutic Exercise:  HSS supine bilateral 30 sec x 5, adductor stretch bilateral 30 sec x 5, knee to chest 10 sec x 10 right and left, supine TA 10 s x 10, quad sets 5s x 10 right and left, clams right and left 2 x 10, bridges 2 x15, prone hip extension 2 x10 right and left shutlle bilateral squats 2 x 15 6 bands, bilateral calf rasies 2 x 15 6 bands hip hikes 2 x 10 each(45minutes)      PLAN:  Patient will benefit from physical therapy (2) x/week for (6) weeks including manual therapy, therapeutic exercise, functional activities, modalities, and patient education.    Thank you for this referral.    These services are reasonable and necessary for the conditions set forth above while under my care.

## 2017-07-27 ENCOUNTER — CLINICAL SUPPORT (OUTPATIENT)
Dept: REHABILITATION | Facility: HOSPITAL | Age: 77
End: 2017-07-27
Attending: ANESTHESIOLOGY
Payer: MEDICARE

## 2017-07-27 DIAGNOSIS — M12.88 TRANSIENT ARTHROPATHY, OTHER SPECIFIED SITE: ICD-10-CM

## 2017-07-27 DIAGNOSIS — M96.1 POSTLAMINECTOMY SYNDROME, UNSPECIFIED REGION: Primary | ICD-10-CM

## 2017-07-27 DIAGNOSIS — M51.36 DEGENERATION OF LUMBAR INTERVERTEBRAL DISC: ICD-10-CM

## 2017-07-27 PROCEDURE — G8980 MOBILITY D/C STATUS: HCPCS | Mod: CI

## 2017-07-27 PROCEDURE — 97110 THERAPEUTIC EXERCISES: CPT

## 2017-07-27 PROCEDURE — G8978 MOBILITY CURRENT STATUS: HCPCS | Mod: CI

## 2017-07-27 PROCEDURE — G8979 MOBILITY GOAL STATUS: HCPCS | Mod: CI

## 2017-07-27 NOTE — PROGRESS NOTES
PHYSICAL THERAPY INITIAL OUTPATIENT EVALUATION    Referring Provider:  Dr. Luis A Diaz    Diagnosis:       ICD-10-CM ICD-9-CM    1. Postlaminectomy syndrome, unspecified region M96.1 722.80    2. Degeneration of lumbar intervertebral disc M51.36 722.52    3. 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  Patient reports pain is biggest complaint is lower back with left quad greater than right side pain; with walking pain goes into calves bilaterally with right greater than the left;  Night pain in the knees left greater than right. Left anterior hip pain into adductor.    Daily comments:  Patient having no more night pain and working out at [employer] doing his HEP there.    Past Medical History:    Past Medical History:   Diagnosis Date    Anemia due to unknown mechanism 11/10/2015    Angina pectoris     not since    Arthritis     Back pain     Coronary artery disease     Diabetes mellitus 20 years     am 2014    Diabetes mellitus type II      am 2015    DM (diabetes mellitus)     BS 78 am 01/10/2017    Hyperlipemia     Hypertension     CHARLES (obstructive sleep apnea)     Spinal cord disease     Trouble in sleeping        Problem List:    Patient Active Problem List   Diagnosis    Combined hyperlipidemia associated with type 2 diabetes mellitus    REM behavioral disorder    Coronary artery disease, occlusive    Hypertension associated with diabetes    Osteoarthritis of spine with radiculopathy, lumbar region    Osteoarthritis of hip    Aortic calcification    Anemia due to unknown mechanism    Type 2 diabetes mellitus with diabetic polyneuropathy, without long-term current use of insulin    PLMD (periodic limb movement disorder)     CHARLES (obstructive sleep apnea)       Current Medications:    Current Outpatient Prescriptions:     acetaminophen (TYLENOL) 650 MG TbSR, Take 1,300 mg by mouth every 8 (eight) hours., Disp: , Rfl:     aspirin (ECOTRIN) 81 MG EC tablet, Take 81 mg by mouth once daily., Disp: , Rfl:     blood sugar diagnostic (ACCU-CHEK FRANKO PLUS TEST STRP) Strp, USE  1  STRIP ONE TIME DAILY, Disp: 100 strip, Rfl: 4    celecoxib (CELEBREX) 200 MG capsule, Take 1 capsule (200 mg total) by mouth once daily., Disp: 90 capsule, Rfl: 3    clonazePAM (KLONOPIN) 0.25 MG TbDL, Take 2 tablets (0.5 mg total) by mouth nightly., Disp: 90 tablet, Rfl: 0    docusate sodium (COLACE) 100 MG capsule, Take 1 capsule (100 mg total) by mouth 2 (two) times daily as needed., Disp: 60 capsule, Rfl: 0    gabapentin (NEURONTIN) 100 MG capsule, TAKE 1 CAPSULE BY MOUTH EVERY NIGHT AT BEDTIME FOR 1 WEEK, THEN INCREASE TO 2 CAPSULES EVERY NIGHT AT BEDTIME THEREAFTER, Disp: 180 capsule, Rfl: 1    glipiZIDE (GLUCOTROL) 5 MG tablet, TAKE 2 TABLETS (10 MG) TWO TIMES DAILY BEFORE MEALS, Disp: 360 tablet, Rfl: 4    lancets (ACCU-CHEK MULTICLIX LANCET) Misc, TEST ONE TIME DAILY, Disp: 100 each, Rfl: 3    losartan-hydrochlorothiazide 50-12.5 mg (HYZAAR) 50-12.5 mg per tablet, TAKE 1 TABLET ONE TIME DAILY, Disp: 90 tablet, Rfl: 4    melatonin 5 mg Cap, , Disp: , Rfl:     metformin (GLUCOPHAGE) 1000 MG tablet, TAKE 1 TABLET TWICE DAILY WITH MEALS, Disp: 180 tablet, Rfl: 4    metoprolol succinate (TOPROL-XL) 50 MG 24 hr tablet, TAKE 1 TABLET EVERY DAY, Disp: 90 tablet, Rfl: 3    oxycodone-acetaminophen (PERCOCET)  mg per tablet, Take 1 tablet by mouth every 4 (four) hours as needed., Disp: 90 tablet, Rfl: 0    oxycodone-acetaminophen (PERCOCET) 5-325 mg per tablet, Take 1 tablet by mouth every 4 (four) hours as needed for Pain., Disp: 65 tablet, Rfl: 0    pramipexole (MIRAPEX) 0.25 MG tablet, Take 1 tablet (0.25 mg total) by mouth every evening.,  Disp: 30 tablet, Rfl: 5    pravastatin (PRAVACHOL) 40 MG tablet, TAKE 1 TABLET EVERY DAY, Disp: 90 tablet, Rfl: 4    senna (SENNA CONCENTRATE) 8.6 mg tablet, Take 1 tablet by mouth once daily., Disp: 60 tablet, Rfl: 0    sildenafil (VIAGRA) 100 MG tablet, Take 1 tablet (100 mg total) by mouth as needed. Take on an empty stomach, Disp: 6 tablet, Rfl: 11    OBJECTIVE:  Pain: 3/10 with walking in the morning, located in back, described as aching and pain.    Sensation:  intact to light touch     Lumbar ROM: Forward Bending   80      Backwardbending  20     Sidebend Right  50      Sidebend Left   50     Rotation Right   50     Rotation Left   50      Strength:  Gluteus Medius   4-/5      Psoas    4/5     Quadriceps   4/5      Rectus Abdominis  4+/5     Anterior Tibialis  4/5     Gastrocnemius  5/5     Transverse Abdominis 4-/5    Function: MODIFIED OSWESTRY LOW BACK PAIN DISABILITY QUESTIONNAIRE    The following scores are patient-reported and range from 0-5, with 0 being least impaired and 5 being most impaired.        Eval REeval  Section 1- Pain intensity    2/5 0/5  Section 2- Person care  2/5 0/5  Section 3 Lifting- Optional  3/5   0/5  Section 4  Walking  4/5  1/5  Section 5 Sitting   3/5 0/5  Section 6 Standing  3/5 1/5  Section 7 Sleeping  4/5 2/5  Section 8 Social Life   2/5 2/5  Section 9 Traveling  2/5 3/5  Section 10 Employ/home  3/5 2/5    Patient reports 16% disability on the Modified Oswestry Low Back Pain Disability Questionnaire.  Currently  CI and goal is [id number], discharge is  CI    Other:  Shifts to right with right hip higher in standing anterior pelvic tilt in standing, tight hip flexors, tight adductors, unable to ER left hip to 10 degrees, IT band tightness, hamstring tightness right greater than left, QL spasm with ES spasms in lower back, piriformis guarding with spasms bilateral    ASSESSMENT:  The patient is a 76 y.o. year old male who presents to physical therapy with complaints  of back pain left hip pain and bilateral leg pain    Co-morbidities which may impact the plan of care and potentially impede the patient's progress in therapy include:  Multiple previous surgeries that were done to correct back pain; the multiple joint problems in the back and lower extremeties.    The patient's clinical presentation is evolving.  Based on patient's evolving clinical presentation, Oa, previous back surgery, HTN, DM co-morbidities, and examination of entire lower extremity and back body systems, patient presents with high complexity.    Short Term Goals:  (3 weeks)  1.  Patient will independent with HEP. (met)  2.  Patient will increase oswestry score at least 9 points.( Met)  3.  Patient will increase flexibility. (met)  4.  Patient will increase core strength to -4/5.  (met)    Long Term Goals:  (6 weeks)  1.  Patient will improve oswestry at least 20. (met)  2.  Patient will decrease pain to 3/10. (met)  3.  Patient will improve overall strength to 4/5 (met)  TREATMENT PROVIDED:      Manual Therapy: LE distraction(NT)    Therapeutic Exercise:  HSS supine bilateral 30 sec x 5, adductor stretch bilateral 30 sec x 5, knee to chest 10 sec x 10 right and left, supine TA 10 s x 10, quad sets 5s x 10 right and left, clams right and left 2 x 10, bridges 2 x15, prone hip extension 2 x10 right and left shutlle bilateral squats 2 x 15 6 bands, bilateral calf rasies 2 x 15 6 bands hip hikes 2 x 10 each(45minutes)      PLAN:  Patient will be discharged from Physical therapy to [facility].  Patient also joined a gym and started last week with workouts  Thank you for this referral.    These services are reasonable and necessary for the conditions set forth above while under my care.

## 2017-08-14 ENCOUNTER — TELEPHONE (OUTPATIENT)
Dept: ORTHOPEDICS | Facility: CLINIC | Age: 77
End: 2017-08-14

## 2017-08-15 ENCOUNTER — OFFICE VISIT (OUTPATIENT)
Dept: ORTHOPEDICS | Facility: CLINIC | Age: 77
End: 2017-08-15
Payer: MEDICARE

## 2017-08-15 ENCOUNTER — TELEPHONE (OUTPATIENT)
Dept: ORTHOPEDICS | Facility: CLINIC | Age: 77
End: 2017-08-15

## 2017-08-15 VITALS
DIASTOLIC BLOOD PRESSURE: 60 MMHG | SYSTOLIC BLOOD PRESSURE: 117 MMHG | BODY MASS INDEX: 27.56 KG/M2 | WEIGHT: 220.44 LBS | HEART RATE: 60 BPM

## 2017-08-15 DIAGNOSIS — M17.12 ARTHRITIS OF LEFT KNEE: ICD-10-CM

## 2017-08-15 DIAGNOSIS — M16.10 HIP ARTHRITIS: ICD-10-CM

## 2017-08-15 DIAGNOSIS — Z01.818 PRE-OP TESTING: Primary | ICD-10-CM

## 2017-08-15 DIAGNOSIS — M16.12 ARTHRITIS OF LEFT HIP: Primary | ICD-10-CM

## 2017-08-15 PROCEDURE — 1125F AMNT PAIN NOTED PAIN PRSNT: CPT | Mod: S$GLB,,, | Performed by: ORTHOPAEDIC SURGERY

## 2017-08-15 PROCEDURE — 1159F MED LIST DOCD IN RCRD: CPT | Mod: S$GLB,,, | Performed by: ORTHOPAEDIC SURGERY

## 2017-08-15 PROCEDURE — 99999 PR PBB SHADOW E&M-EST. PATIENT-LVL III: CPT | Mod: PBBFAC,,, | Performed by: ORTHOPAEDIC SURGERY

## 2017-08-15 PROCEDURE — 3008F BODY MASS INDEX DOCD: CPT | Mod: S$GLB,,, | Performed by: ORTHOPAEDIC SURGERY

## 2017-08-15 PROCEDURE — 3074F SYST BP LT 130 MM HG: CPT | Mod: S$GLB,,, | Performed by: ORTHOPAEDIC SURGERY

## 2017-08-15 PROCEDURE — 99214 OFFICE O/P EST MOD 30 MIN: CPT | Mod: S$GLB,,, | Performed by: ORTHOPAEDIC SURGERY

## 2017-08-15 PROCEDURE — 3078F DIAST BP <80 MM HG: CPT | Mod: S$GLB,,, | Performed by: ORTHOPAEDIC SURGERY

## 2017-08-15 RX ORDER — GLIPIZIDE 5 MG/1
TABLET ORAL
Qty: 360 TABLET | Refills: 4 | Status: SHIPPED | OUTPATIENT
Start: 2017-08-15 | End: 2019-07-24 | Stop reason: SDUPTHER

## 2017-08-15 RX ORDER — METFORMIN HYDROCHLORIDE 1000 MG/1
TABLET ORAL
Qty: 180 TABLET | Refills: 4 | Status: SHIPPED | OUTPATIENT
Start: 2017-08-15 | End: 2018-09-05 | Stop reason: SDUPTHER

## 2017-08-15 RX ORDER — LOSARTAN POTASSIUM AND HYDROCHLOROTHIAZIDE 12.5; 5 MG/1; MG/1
TABLET ORAL
Qty: 90 TABLET | Refills: 4 | Status: SHIPPED | OUTPATIENT
Start: 2017-08-15 | End: 2018-09-05 | Stop reason: SDUPTHER

## 2017-08-15 NOTE — PATIENT INSTRUCTIONS
What is Arthritis?  Arthritis is a disease that affects the joints (the parts where bones meet and move). It can affect any joint in your body. There are many types of arthritis, including osteoarthritis and rheumatoid arthrtitis. If your symptoms are mild, medications may be enough to reduce pain and swelling. For more severe arthritis, surgery may be needed to improve the condition of the joint or replace the joint entirely.                  What causes arthritis?  Cartilage is a smooth substance that protects the ends of your bones and provides cushioning. When you have arthritis, this cartilage breaks down and can no longer protect your bones. The bones rub against each other, causing pain and swelling. Over time, bone spurs (small pieces of rough or splintered bone) may develop, and the joint's range of motion can become limited.  Symptoms  Some of the more common symptoms of arthritis include:  · Joint pain and stiffness. Pain and stiffness get worse with long periods of rest or using a joint too long or too hard.  · Joints that have lost normal shape and motion.  · Tender, inflamed joints. They may look red and feel warm.  · Grinding or popping noise with joint movement.   · Feeling tired all the time.  Reducing symptoms  Following a healthy lifestyle by losing weight and exercising can help reduce symptoms of osteoarthritis. Medicines can be very helpful for arthritis.     Date Last Reviewed: 9/10/2015  © 3343-6215 [employer]. [address] [place] [address], [place] [address]. All rights reserved. This information is not intended as a substitute for professional medical care. Always follow your healthcare professional's instructions.

## 2017-08-15 NOTE — PROGRESS NOTES
CC:This is a 76-year-old male that complains of left knee and hip pain.    HPI:the patient has failed conservative treatment of the left hip arthritis.  The pain ranges from a 2 out of 10 to a 10 out of 10.  The pain interferes with his activities of daily living and is gradually worsening.    PMH:    Past Medical History:   Diagnosis Date    Anemia due to unknown mechanism 11/10/2015    Angina pectoris 2014    not since    Arthritis     Back pain     Coronary artery disease     Diabetes mellitus 20 years     am 09/29/2014    Diabetes mellitus type II 1994     am 12/22/2015    DM (diabetes mellitus) 1994    BS 78 am 01/10/2017    Hyperlipemia     Hypertension     CHARLES (obstructive sleep apnea)     Spinal cord disease     Trouble in sleeping     Type 2 diabetes mellitus with diabetic polyneuropathy, without long-term current use of insulin        PSH:    Past Surgical History:   Procedure Laterality Date    HERNIA REPAIR Bilateral 04/18/2017    LAMINECTOMY  07/22/2016    ROTATOR CUFF REPAIR Left 2006    SHOULDER ARTHROSCOPY W/ ROTATOR CUFF REPAIR Right 2009       Family Hx:    Family History   Problem Relation Age of Onset    Hypertension Mother     Heart disease Mother     Diabetes Mother     Hypertension Father     Heart disease Father     Diabetes Father     Stroke Father     Diabetes Sister     Cancer Brother 50     pancreas    Drug abuse Brother     Prostate cancer Neg Hx     Macular degeneration Neg Hx     Retinal detachment Neg Hx     Strabismus Neg Hx     Glaucoma Neg Hx     Blindness Neg Hx     Amblyopia Neg Hx     Kidney disease Neg Hx     Mental illness Neg Hx     Mental retardation Neg Hx     COPD Neg Hx     Asthma Neg Hx        Allergy:    Review of patient's allergies indicates:   Allergen Reactions    Sulfa (sulfonamide antibiotics)      Can't recall from 1995       Medication:    Current Outpatient Prescriptions:     acetaminophen (TYLENOL) 650 MG  TbSR, Take 1,300 mg by mouth every 8 (eight) hours., Disp: , Rfl:     aspirin (ECOTRIN) 81 MG EC tablet, Take 81 mg by mouth once daily., Disp: , Rfl:     blood sugar diagnostic (ACCU-CHEK FRANKO PLUS TEST STRP) Strp, USE  1  STRIP ONE TIME DAILY, Disp: 100 strip, Rfl: 4    celecoxib (CELEBREX) 200 MG capsule, Take 1 capsule (200 mg total) by mouth once daily., Disp: 90 capsule, Rfl: 3    clonazePAM (KLONOPIN) 0.25 MG TbDL, Take 2 tablets (0.5 mg total) by mouth nightly., Disp: 90 tablet, Rfl: 0    docusate sodium (COLACE) 100 MG capsule, Take 1 capsule (100 mg total) by mouth 2 (two) times daily as needed., Disp: 60 capsule, Rfl: 0    gabapentin (NEURONTIN) 100 MG capsule, TAKE 1 CAPSULE BY MOUTH EVERY NIGHT AT BEDTIME FOR 1 WEEK, THEN INCREASE TO 2 CAPSULES EVERY NIGHT AT BEDTIME THEREAFTER, Disp: 180 capsule, Rfl: 1    glipiZIDE (GLUCOTROL) 5 MG tablet, TAKE 2 TABLETS (10 MG) TWO TIMES DAILY BEFORE MEALS, Disp: 360 tablet, Rfl: 4    lancets (ACCU-CHEK MULTICLIX LANCET) Misc, TEST ONE TIME DAILY, Disp: 100 each, Rfl: 3    losartan-hydrochlorothiazide 50-12.5 mg (HYZAAR) 50-12.5 mg per tablet, TAKE 1 TABLET ONE TIME DAILY, Disp: 90 tablet, Rfl: 4    melatonin 5 mg Cap, , Disp: , Rfl:     metformin (GLUCOPHAGE) 1000 MG tablet, TAKE 1 TABLET TWICE DAILY WITH MEALS, Disp: 180 tablet, Rfl: 4    metoprolol succinate (TOPROL-XL) 50 MG 24 hr tablet, TAKE 1 TABLET EVERY DAY, Disp: 90 tablet, Rfl: 3    oxycodone-acetaminophen (PERCOCET)  mg per tablet, Take 1 tablet by mouth every 4 (four) hours as needed., Disp: 90 tablet, Rfl: 0    oxycodone-acetaminophen (PERCOCET) 5-325 mg per tablet, Take 1 tablet by mouth every 4 (four) hours as needed for Pain., Disp: 65 tablet, Rfl: 0    pramipexole (MIRAPEX) 0.25 MG tablet, Take 1 tablet (0.25 mg total) by mouth every evening., Disp: 30 tablet, Rfl: 5    pravastatin (PRAVACHOL) 40 MG tablet, TAKE 1 TABLET EVERY DAY, Disp: 90 tablet, Rfl: 4    senna (SENNA  CONCENTRATE) 8.6 mg tablet, Take 1 tablet by mouth once daily., Disp: 60 tablet, Rfl: 0    sildenafil (VIAGRA) 100 MG tablet, Take 1 tablet (100 mg total) by mouth as needed. Take on an empty stomach, Disp: 6 tablet, Rfl: 11    Social History:    Social History     Social History    Marital status:      Spouse name: N/A    Number of children: 0    Years of education: N/A     Occupational History    retired      teacher     Social History Main Topics    Smoking status: Never Smoker    Smokeless tobacco: Never Used    Alcohol use 0.6 oz/week     1 Glasses of wine per week      Comment: rarely     Drug use: No    Sexual activity: Yes     Partners: Female     Birth control/ protection: Condom     Other Topics Concern    Not on file     Social History Narrative    Single lives alone. No children. Retired but some part time work - 4 hours a day. 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 Caffeine intake - sugar free cola, Rare coffee intake. Still drives. Does have a Living Will . Has a nephew Jt Gayle, lives in [place]. Has a friend Karina Rossi, locally who could help him.        Vitals:   /60   Pulse 60   Wt 100 kg (220 lb 7.4 oz)   BMI 27.56 kg/m²      ROS:  GENERAL: No fever, chills, fatigability or weight loss.  SKIN: No rashes, itching or changes in color or texture of skin.  HEAD: No headaches or recent head trauma.  EYES: Visual acuity fine. No photophobia, ocular pain or diplopia.  EARS: Denies ear pain, discharge or vertigo.  NOSE: No loss of smell, no epistaxis or postnasal drip.  MOUTH & THROAT: No hoarseness or change in voice. No excessive gum bleeding.  NODES: Denies swollen glands.  CHEST: Denies DAMON, cyanosis, wheezing, cough and sputum production.  CARDIOVASCULAR: Denies chest pain, PND, orthopnea or reduced exercise tolerance.  ABDOMEN: Appetite fine. No weight loss. Denies diarrhea, abdominal pain, hematemesis or blood in stool.  URINARY: No flank pain, dysuria or  hematuria.  PERIPHERAL VASCULAR: No claudication or cyanosis.  NEUROLOGIC: No history of seizures, paralysis, alteration of gait or coordination.  MUSCULOSKELETAL: See HPI    PE:  APPEARANCE: Well nourished, well developed, in no acute distress.   HEAD: Normocephalic, atraumatic.  EYES: PERRL. EOMI.   EARS: TM's intact. Light reflex normal. No retraction or perforation.   NOSE: Mucosa pink. Airway clear.  MOUTH & THROAT: No tonsillar enlargement. No pharyngeal erythema or exudate. No stridor.  NECK: Supple.   NODES: No cervical, axillary or inguinal lymph node enlargement.  CHEST: Lungs clear to auscultation.  CARDIOVASCULAR: Normal S1, S2. No rubs, murmurs or gallops.  ABDOMEN: Bowel sounds normal. Not distended. Soft. No tenderness or masses.  NEUROLOGIC: Cranial Nerves: II-XII grossly intact, also see MUSCULOSKELETAL  MUSCULOSKELETAL:            Hip                            ( Left)  Degrees     Normal  Flexion                    decreased                                0-135  Extension              decreased                                 0-30  Abduction               decreased                                  0-50  Adduction              decreased                                   0-30  Medial Rotation     decreased                                    0-40                         Lateral Rotation    decreased                                    0-60  ositiveCrepitus   Greater Trochanteric tenderness  +1/4 DP and PT artery pulses         Sensation intact all dermatomes  +4-5/5 motor strength throughout  +2/4 DTR-PT,AT  Negative long tract signs- neg Babinski, neg Clonus        Assessment:           Diagnosis:              1.left hip arthritis                2. Left knee arthritis                    Diagnostic Studies  MRI-No  X-Ray-No  EMG/NCV-No  Arthrogram-No  Bone Scan-No  CT Scan-No  Doppler-No  ESR-No  CRP-No  CBC with Diff-No   Rheumatoid/Arthritis Panel-No      Plan:                                                  1. PT-yes                                                 2.OT-no                                          3.NSAID-yes                                        4. Narcotics-yes                                     5. Wound care-N/A                                 6. Rest-yes                                           7. Surgery-yes, I recommend the patient undergo a left total hip replacement.                                         8. BA Hose-no                                    9. Anticoagulation therapy-no               10. Elevation-no                                     11. Crutches-no                                    12. Walker-no             13. Cane yes                        14. Referral-no                                     15.Injection-no                            16. Splint   /    Cast   /   Cast Shoe-Yes              17. RICE-none            18. Follow up- 3 weeks

## 2017-08-15 NOTE — LETTER
August 15, 2017      Leatha Franks PA-C  [id number] [place] [id number]           [facility]  [address] [place] [address]  Phone: [phone]  Fax: [phone]          Patient: Srinath Mcknight   MR Number: [id number]   YOB: 1940   Date of Visit: 8/15/2017       Dear Leatha Franks:    Thank you for referring Srinath Mcknight to me for evaluation. Attached you will find relevant portions of my assessment and plan of care.    If you have questions, please do not hesitate to call me. I look forward to following Srinath Mcknight along with you.    Sincerely,    Albaro Alcocer Sr., MD    Enclosure  CC:  No Recipients    If you would like to receive this communication electronically, please contact [email or web address] or [phone] to request more information on [employer] access.    For providers and/or their staff who would like to refer a patient to [facility], please contact us through our one-stop-shop provider referral line, Shaka Marcial, at [phone].    If you feel you have received this communication in error or would no longer like to receive these types of communications, please e-mail [email or web address]

## 2017-08-15 NOTE — TELEPHONE ENCOUNTER
----- Message from Kathryn Lopez sent at 8/15/2017  2:44 PM CDT -----  Contact: Pt  Pt is requesting to speak to the nurse regarding scheduling a surgey. Pls call pt back at [phone].

## 2017-08-15 NOTE — TELEPHONE ENCOUNTER
Spoke with the patient who was unsure about surgery . Patient has called back to confirm his surgery date. October 9th. Pt confirmed and verbalized understanding. -AS

## 2017-08-17 ENCOUNTER — OFFICE VISIT (OUTPATIENT)
Dept: INTERNAL MEDICINE | Facility: CLINIC | Age: 77
End: 2017-08-17
Payer: MEDICARE

## 2017-08-17 ENCOUNTER — LAB VISIT (OUTPATIENT)
Dept: LAB | Facility: HOSPITAL | Age: 77
End: 2017-08-17
Payer: MEDICARE

## 2017-08-17 VITALS
WEIGHT: 221.56 LBS | SYSTOLIC BLOOD PRESSURE: 120 MMHG | HEART RATE: 72 BPM | BODY MASS INDEX: 27.55 KG/M2 | DIASTOLIC BLOOD PRESSURE: 70 MMHG | TEMPERATURE: 98 F | HEIGHT: 75 IN

## 2017-08-17 DIAGNOSIS — R31.0 GROSS HEMATURIA: ICD-10-CM

## 2017-08-17 DIAGNOSIS — E11.42 TYPE 2 DIABETES MELLITUS WITH DIABETIC POLYNEUROPATHY, WITHOUT LONG-TERM CURRENT USE OF INSULIN: ICD-10-CM

## 2017-08-17 DIAGNOSIS — H61.23 BILATERAL IMPACTED CERUMEN: ICD-10-CM

## 2017-08-17 DIAGNOSIS — E11.59 HYPERTENSION ASSOCIATED WITH DIABETES: Chronic | ICD-10-CM

## 2017-08-17 DIAGNOSIS — I15.2 HYPERTENSION ASSOCIATED WITH DIABETES: Chronic | ICD-10-CM

## 2017-08-17 LAB
BILIRUB UR QL STRIP: NEGATIVE
CLARITY UR REFRACT.AUTO: ABNORMAL
COLOR UR AUTO: YELLOW
GLUCOSE UR QL STRIP: NEGATIVE
HGB UR QL STRIP: NEGATIVE
KETONES UR QL STRIP: NEGATIVE
LEUKOCYTE ESTERASE UR QL STRIP: NEGATIVE
MICROSCOPIC COMMENT: NORMAL
NITRITE UR QL STRIP: NEGATIVE
PH UR STRIP: 6 [PH] (ref 5–8)
PROT UR QL STRIP: NEGATIVE
RBC #/AREA URNS AUTO: 1 /HPF (ref 0–4)
SP GR UR STRIP: 1.01 (ref 1–1.03)
URN SPEC COLLECT METH UR: ABNORMAL
UROBILINOGEN UR STRIP-ACNC: NEGATIVE EU/DL
WBC #/AREA URNS AUTO: 1 /HPF (ref 0–5)

## 2017-08-17 PROCEDURE — 4010F ACE/ARB THERAPY RXD/TAKEN: CPT | Mod: S$GLB,,, | Performed by: INTERNAL MEDICINE

## 2017-08-17 PROCEDURE — 3044F HG A1C LEVEL LT 7.0%: CPT | Mod: S$GLB,,, | Performed by: INTERNAL MEDICINE

## 2017-08-17 PROCEDURE — 3008F BODY MASS INDEX DOCD: CPT | Mod: S$GLB,,, | Performed by: INTERNAL MEDICINE

## 2017-08-17 PROCEDURE — 3074F SYST BP LT 130 MM HG: CPT | Mod: S$GLB,,, | Performed by: INTERNAL MEDICINE

## 2017-08-17 PROCEDURE — 36415 COLL VENOUS BLD VENIPUNCTURE: CPT | Mod: PO

## 2017-08-17 PROCEDURE — 1159F MED LIST DOCD IN RCRD: CPT | Mod: S$GLB,,, | Performed by: INTERNAL MEDICINE

## 2017-08-17 PROCEDURE — 99499 UNLISTED E&M SERVICE: CPT | Mod: S$GLB,,, | Performed by: INTERNAL MEDICINE

## 2017-08-17 PROCEDURE — 3078F DIAST BP <80 MM HG: CPT | Mod: S$GLB,,, | Performed by: INTERNAL MEDICINE

## 2017-08-17 PROCEDURE — 99999 PR PBB SHADOW E&M-EST. PATIENT-LVL III: CPT | Mod: PBBFAC,,, | Performed by: INTERNAL MEDICINE

## 2017-08-17 PROCEDURE — 83036 HEMOGLOBIN GLYCOSYLATED A1C: CPT

## 2017-08-17 PROCEDURE — 99214 OFFICE O/P EST MOD 30 MIN: CPT | Mod: S$GLB,,, | Performed by: INTERNAL MEDICINE

## 2017-08-17 PROCEDURE — 1125F AMNT PAIN NOTED PAIN PRSNT: CPT | Mod: S$GLB,,, | Performed by: INTERNAL MEDICINE

## 2017-08-17 NOTE — PROGRESS NOTES
"Subjective:       Patient ID: Srinath Mcknight is a 76 y.o. male.    Chief Complaint: Follow-up    HPI  patient is a 76-year-old male coming in following up on his diabetes, hypertension.  Regards his diabetes he's been doing pretty well he is not noticing any major issues.  He is due for an A1c so we will draw that today.  He is having no hypoglycemia or significant hyperglycemia.  He'll continue his current medications well to get some updated labs.    Blood pressure remains under good control.  He relates no issues with his medications.  No signs or symptoms of cardiac decompensation.    He reports 2 episodes of gross hematuria over the last couple of months.  First episode he does note some blood in his underwear with the second episode he had he saw blood in the urine.  Both times this is relatively short-lived.  Both times it was not a major presentation.  He relates no other associated symptoms with the blood in the urine.  Prior to these episodes he is not had any problems with it before.  He notes no pain with urination no hasn't seen no urgency.    He indicates he's struggle some with wax in his ears.  He has hearing aids in their continuation coming up with wax.  He uses the over-the-counter ear wax removal kits but is not had great success with that.  He wanted see if there are some other option we could help him with.    Review of Systems   Constitutional: Negative for fever and unexpected weight change.   Respiratory: Negative for cough, shortness of breath and wheezing.    Cardiovascular: Negative for chest pain and palpitations.   Gastrointestinal: Negative for constipation, diarrhea, nausea and vomiting.   Genitourinary: Positive for hematuria. Negative for dysuria.       Objective:   /70   Pulse 72   Temp 97.5 °F (36.4 °C) (Tympanic)   Ht 6' 3" (1.905 m)   Wt 100.5 kg (221 lb 9 oz)   BMI 27.69 kg/m²      Physical Exam   Constitutional: He appears well-developed and well-nourished. "   HENT:   Head: Normocephalic and atraumatic.   Bilateral cerumen impaction   Eyes: Pupils are equal, round, and reactive to light.   Neck: Neck supple. No thyromegaly present.   Cardiovascular: Normal rate, regular rhythm and normal heart sounds.  Exam reveals no gallop and no friction rub.    No murmur heard.  Pulmonary/Chest: Breath sounds normal. He has no wheezes. He has no rales.   Abdominal: Soft. Bowel sounds are normal. He exhibits no distension. There is no tenderness.   Vitals reviewed.      No visits with results within 2 Week(s) from this visit.   Latest known visit with results is:   Admission on 04/18/2017, Discharged on 04/18/2017   Component Date Value    POCT Glucose 04/18/2017 149*    POCT Glucose 04/18/2017 142*       Assessment:       1. Type 2 diabetes mellitus with diabetic polyneuropathy, without long-term current use of insulin    2. Hypertension associated with diabetes    3. Gross hematuria    4. Bilateral impacted cerumen        Plan:   Type 2 diabetes mellitus with diabetic polyneuropathy, without long-term current use of insulin  Has been stable.  Update a1c today    Hypertension associated with diabetes  Blood pressure is under good control.  We will continue the current regimen.  Will work on regular aerobic exercise and a low salt diet.        [place] was seen today for follow-up.    Diagnoses and all orders for this visit:    Type 2 diabetes mellitus with diabetic polyneuropathy, without long-term current use of insulin  -     Hemoglobin A1c; Future    Hypertension associated with diabetes    Gross hematuria  -     URINALYSIS  -     US Retroperitoneal Complete (Kidney and; Future  -     Ambulatory referral to Urology    Bilateral impacted cerumen  Comments:  Lavage today.  Home ear wax kit once weekly after lavage today.     will update an A1c today for the diabetes.  His blood pressure will continue his current regimen.    For the hematuria will get a urinalysis to see if  there is still any evidence of blood in the urine and we'll get an ultrasound of the kidneys.  We'll make referral to urology for consideration of cystoscopy for this issue.    For the cerumen issue or any undue cerumen lavage today and get the ears cleared out.  Once the years are clear and recommend a once weekly treatment with the over-the-counter ear wax removal kit try to keep the wax at a limited amount.  If he does not have success with that approach we will see what other options we can come up with.    Return in about 6 weeks (around 9/26/2017) for Preop.

## 2017-08-18 ENCOUNTER — TELEPHONE (OUTPATIENT)
Dept: RADIOLOGY | Facility: HOSPITAL | Age: 77
End: 2017-08-18

## 2017-08-18 LAB
ESTIMATED AVG GLUCOSE: 137 MG/DL
HBA1C MFR BLD HPLC: 6.4 %

## 2017-08-22 ENCOUNTER — TELEPHONE (OUTPATIENT)
Dept: RADIOLOGY | Facility: HOSPITAL | Age: 77
End: 2017-08-22

## 2017-08-23 ENCOUNTER — HOSPITAL ENCOUNTER (OUTPATIENT)
Dept: RADIOLOGY | Facility: HOSPITAL | Age: 77
Discharge: HOME | End: 2017-08-23
Attending: INTERNAL MEDICINE
Payer: MEDICARE

## 2017-08-23 DIAGNOSIS — R31.0 GROSS HEMATURIA: ICD-10-CM

## 2017-08-23 PROCEDURE — 76770 US EXAM ABDO BACK WALL COMP: CPT | Mod: TC,PO

## 2017-08-23 PROCEDURE — 76770 US EXAM ABDO BACK WALL COMP: CPT | Mod: 26,,, | Performed by: RADIOLOGY

## 2017-08-29 ENCOUNTER — OFFICE VISIT (OUTPATIENT)
Dept: CARDIOLOGY | Facility: CLINIC | Age: 77
End: 2017-08-29
Payer: MEDICARE

## 2017-08-29 ENCOUNTER — CLINICAL SUPPORT (OUTPATIENT)
Dept: CARDIOLOGY | Facility: CLINIC | Age: 77
End: 2017-08-29
Payer: MEDICARE

## 2017-08-29 VITALS
HEIGHT: 75 IN | BODY MASS INDEX: 27.35 KG/M2 | HEART RATE: 70 BPM | WEIGHT: 220 LBS | SYSTOLIC BLOOD PRESSURE: 120 MMHG | DIASTOLIC BLOOD PRESSURE: 60 MMHG

## 2017-08-29 DIAGNOSIS — E11.69 COMBINED HYPERLIPIDEMIA ASSOCIATED WITH TYPE 2 DIABETES MELLITUS: Chronic | ICD-10-CM

## 2017-08-29 DIAGNOSIS — E11.59 HYPERTENSION ASSOCIATED WITH DIABETES: Chronic | ICD-10-CM

## 2017-08-29 DIAGNOSIS — I70.0 AORTIC CALCIFICATION: Chronic | ICD-10-CM

## 2017-08-29 DIAGNOSIS — I25.10 CORONARY ARTERY DISEASE, OCCLUSIVE: Primary | Chronic | ICD-10-CM

## 2017-08-29 DIAGNOSIS — Z01.818 PRE-OP TESTING: ICD-10-CM

## 2017-08-29 DIAGNOSIS — I15.2 HYPERTENSION ASSOCIATED WITH DIABETES: Chronic | ICD-10-CM

## 2017-08-29 DIAGNOSIS — E78.2 COMBINED HYPERLIPIDEMIA ASSOCIATED WITH TYPE 2 DIABETES MELLITUS: Chronic | ICD-10-CM

## 2017-08-29 PROCEDURE — 4010F ACE/ARB THERAPY RXD/TAKEN: CPT | Mod: S$GLB,,, | Performed by: NUCLEAR MEDICINE

## 2017-08-29 PROCEDURE — 99999 PR PBB SHADOW E&M-EST. PATIENT-LVL III: CPT | Mod: PBBFAC,,, | Performed by: NUCLEAR MEDICINE

## 2017-08-29 PROCEDURE — 3008F BODY MASS INDEX DOCD: CPT | Mod: S$GLB,,, | Performed by: NUCLEAR MEDICINE

## 2017-08-29 PROCEDURE — 1159F MED LIST DOCD IN RCRD: CPT | Mod: S$GLB,,, | Performed by: NUCLEAR MEDICINE

## 2017-08-29 PROCEDURE — 99499 UNLISTED E&M SERVICE: CPT | Mod: S$GLB,,, | Performed by: NUCLEAR MEDICINE

## 2017-08-29 PROCEDURE — 3078F DIAST BP <80 MM HG: CPT | Mod: S$GLB,,, | Performed by: NUCLEAR MEDICINE

## 2017-08-29 PROCEDURE — 3044F HG A1C LEVEL LT 7.0%: CPT | Mod: S$GLB,,, | Performed by: NUCLEAR MEDICINE

## 2017-08-29 PROCEDURE — 1126F AMNT PAIN NOTED NONE PRSNT: CPT | Mod: S$GLB,,, | Performed by: NUCLEAR MEDICINE

## 2017-08-29 PROCEDURE — 3074F SYST BP LT 130 MM HG: CPT | Mod: S$GLB,,, | Performed by: NUCLEAR MEDICINE

## 2017-08-29 PROCEDURE — 93000 ELECTROCARDIOGRAM COMPLETE: CPT | Mod: S$GLB,,, | Performed by: INTERNAL MEDICINE

## 2017-08-29 PROCEDURE — 99215 OFFICE O/P EST HI 40 MIN: CPT | Mod: S$GLB,,, | Performed by: NUCLEAR MEDICINE

## 2017-08-29 NOTE — PROGRESS NOTES
Subjective:   Patient ID:  Srinath Mcknight is a 76 y.o. male who presents for follow-up of Pre-op Exam; Coronary Artery Disease; Hypertension; and Hyperlipidemia      HPI PRESENTS FOR PRE OP CARD EXAM, PRIOR TO ORTHO SURGERY- LEFT HIP REPLACEMENT-  HX OF CHRONIC  CAD-  AA ATHEROSCLEROSIS ,  ESSENTIAL HTN , DYSLIPIDEMIA AND T2D  NO RECENT HOSPITALIZATIONS OR ED VISITS FOR ACS OR ADHF  ECG TODAY- SR, CHRONIC MINOR  NS ST T CHANGES  HAS HAD A  NUCLEAR STRESS TEST - 2013, MILDLY ABNORMAL ISCHEMIA.  NORMAL LV SYSTOLIC FUNCTION  PRESENTLY ONLY COMPLAINS-  DAMON WITH MODERATE EXERTION.  NO ORTHOPNEA OR PND  NO HX OF PALPITATIONS. NO SYNCOPE  NO FOCAL CNS SYMPTOMS OR SING TO SUGGEST TIA OR STROKE  NO HX OF RECENT ABNORMAL BLEEDING    Review of Systems   Constitution: Negative for chills, fever, weakness, night sweats, weight gain and weight loss.   HENT: Negative for headaches and nosebleeds.    Eyes: Negative for blurred vision, double vision and visual disturbance.   Cardiovascular: Positive for dyspnea on exertion. Negative for chest pain, irregular heartbeat, leg swelling, orthopnea, palpitations, paroxysmal nocturnal dyspnea and syncope.   Respiratory: Negative for cough, hemoptysis and wheezing.    Endocrine: Negative for polydipsia and polyuria.   Hematologic/Lymphatic: Does not bruise/bleed easily.   Skin: Negative for rash.   Musculoskeletal: Positive for joint pain. Negative for joint swelling, muscle weakness and myalgias.   Gastrointestinal: Negative for abdominal pain, hematemesis, jaundice and melena.   Genitourinary: Negative for dysuria, hematuria and nocturia.   Neurological: Negative for dizziness, focal weakness and sensory change.   Psychiatric/Behavioral: Negative for depression. The patient does not have insomnia and is not nervous/anxious.      Family History   Problem Relation Age of Onset    Hypertension Mother     Heart disease Mother     Diabetes Mother     Hypertension Father     Heart  disease Father     Diabetes Father     Stroke Father     Diabetes Sister     Cancer Brother 50     pancreas    Drug abuse Brother     Prostate cancer Neg Hx     Macular degeneration Neg Hx     Retinal detachment Neg Hx     Strabismus Neg Hx     Glaucoma Neg Hx     Blindness Neg Hx     Amblyopia Neg Hx     Kidney disease Neg Hx     Mental illness Neg Hx     Mental retardation Neg Hx     COPD Neg Hx     Asthma Neg Hx      Past Medical History:   Diagnosis Date    Anemia due to unknown mechanism 11/10/2015    Angina pectoris 2014    not since    Arthritis     Back pain     Coronary artery disease     Diabetes mellitus 20 years     am 09/29/2014    Diabetes mellitus type II 1994     am 12/22/2015    DM (diabetes mellitus) 1994    BS 78 am 01/10/2017    Hyperlipemia     Hypertension     CHARLES (obstructive sleep apnea)     Spinal cord disease     Trouble in sleeping     Type 2 diabetes mellitus with diabetic polyneuropathy, without long-term current use of insulin      Current Outpatient Prescriptions on File Prior to Visit   Medication Sig Dispense Refill    acetaminophen (TYLENOL) 650 MG TbSR Take 1,300 mg by mouth every 8 (eight) hours.      aspirin (ECOTRIN) 81 MG EC tablet Take 81 mg by mouth once daily.      blood sugar diagnostic (ACCU-CHEK FRANKO PLUS TEST STRP) Strp USE  1  STRIP ONE TIME DAILY 100 strip 4    celecoxib (CELEBREX) 200 MG capsule Take 1 capsule (200 mg total) by mouth once daily. 90 capsule 3    clonazePAM (KLONOPIN) 0.25 MG TbDL Take 2 tablets (0.5 mg total) by mouth nightly. 90 tablet 0    docusate sodium (COLACE) 100 MG capsule Take 1 capsule (100 mg total) by mouth 2 (two) times daily as needed. 60 capsule 0    gabapentin (NEURONTIN) 100 MG capsule TAKE 1 CAPSULE BY MOUTH EVERY NIGHT AT BEDTIME FOR 1 WEEK, THEN INCREASE TO 2 CAPSULES EVERY NIGHT AT BEDTIME THEREAFTER 180 capsule 1    glipiZIDE (GLUCOTROL) 5 MG tablet TAKE 2 TABLETS (10 MG) TWO  TIMES DAILY BEFORE MEALS 360 tablet 4    lancets (ACCU-CHEK MULTICLIX LANCET) Misc TEST ONE TIME DAILY 100 each 3    losartan-hydrochlorothiazide 50-12.5 mg (HYZAAR) 50-12.5 mg per tablet TAKE 1 TABLET ONE TIME DAILY 90 tablet 4    melatonin 5 mg Cap       metformin (GLUCOPHAGE) 1000 MG tablet TAKE 1 TABLET TWICE DAILY WITH MEALS 180 tablet 4    metoprolol succinate (TOPROL-XL) 50 MG 24 hr tablet TAKE 1 TABLET EVERY DAY 90 tablet 3    oxycodone-acetaminophen (PERCOCET)  mg per tablet Take 1 tablet by mouth every 4 (four) hours as needed. 90 tablet 0    oxycodone-acetaminophen (PERCOCET) 5-325 mg per tablet Take 1 tablet by mouth every 4 (four) hours as needed for Pain. 65 tablet 0    pramipexole (MIRAPEX) 0.25 MG tablet Take 1 tablet (0.25 mg total) by mouth every evening. 30 tablet 5    pravastatin (PRAVACHOL) 40 MG tablet TAKE 1 TABLET EVERY DAY 90 tablet 4    senna (SENNA CONCENTRATE) 8.6 mg tablet Take 1 tablet by mouth once daily. 60 tablet 0    sildenafil (VIAGRA) 100 MG tablet Take 1 tablet (100 mg total) by mouth as needed. Take on an empty stomach 6 tablet 11     No current facility-administered medications on file prior to visit.      Review of patient's allergies indicates:   Allergen Reactions    Sulfa (sulfonamide antibiotics)      Can't recall from 1995       Objective:     Physical Exam   Constitutional: He is oriented to person, place, and time. He appears well-developed. No distress.   HENT:   Head: Normocephalic.   Eyes: Conjunctivae are normal. Pupils are equal, round, and reactive to light.   Neck: Neck supple. No JVD present. No thyromegaly present.   Cardiovascular: Normal rate, regular rhythm, normal heart sounds and intact distal pulses.  Exam reveals no gallop and no friction rub.    No murmur heard.  Pulses:       Carotid pulses are 2+ on the right side, and 2+ on the left side.       Radial pulses are 2+ on the right side, and 2+ on the left side.        Femoral pulses  are 2+ on the right side, and 2+ on the left side.       Popliteal pulses are 2+ on the right side, and 2+ on the left side.        Dorsalis pedis pulses are 2+ on the right side, and 2+ on the left side.        Posterior tibial pulses are 2+ on the right side, and 2+ on the left side.   Pulmonary/Chest: Breath sounds normal. He has no wheezes. He has no rales. He exhibits no tenderness.   Abdominal: Soft. Bowel sounds are normal. He exhibits no mass. There is no hepatosplenomegaly. There is no tenderness.   Musculoskeletal: He exhibits no edema or tenderness.        Cervical back: Normal.        Thoracic back: Normal.        Lumbar back: Normal.   Lymphadenopathy:     He has no cervical adenopathy.     He has no axillary adenopathy.        Right: No supraclavicular adenopathy present.        Left: No supraclavicular adenopathy present.   Neurological: He is alert and oriented to person, place, and time. He has normal strength. No sensory deficit. Gait normal.   Skin: Skin is warm. No cyanosis. No pallor. Nails show no clubbing.   Psychiatric: He has a normal mood and affect. His speech is normal and behavior is normal. Cognition and memory are normal.       Assessment:     1. Coronary artery disease, occlusive    2. Aortic calcification    3. Hypertension associated with diabetes    4. Combined hyperlipidemia associated with type 2 diabetes mellitus      WE NEED TO EVALUATE FOR RISK STRATIFICATION FOR CAD  WITH NUCLEAR STRESS TEST  Plan:     Coronary artery disease, occlusive    Aortic calcification    Hypertension associated with diabetes    Combined hyperlipidemia associated with type 2 diabetes mellitus      1- SCHEDULE LEXISCAN  MYOCARDIAL PERFUSION IMAGING TEST    2- PHONE CALL TO REVIEW RESULTS AND FURTHER RECOMMENDATIONS    9/13/17  ADDENDUM NOTE  RECENT NUCLEAR STRESS TEST - WAS REVIEWED AND DISCUSSED WITH PT  NEGATIVE FOR PERFUSION ABNORMALITIES-  LV EF 55%    RECOMMENDATIONS    1- LOW RISK FOR  PERIOPERATIVE CV EVENTS    2- NO CV CONTRAINDICATIONS FOR ORTHO SURGERY AND ANESTHESIA    3- CONTINUE BBS THE AM OF PROCEDURE    4- NO FURTHER CARD WORK UP    5- RETURN 3 MONTHS AFTER SURGERY

## 2017-09-07 ENCOUNTER — CLINICAL SUPPORT (OUTPATIENT)
Dept: CARDIOLOGY | Facility: CLINIC | Age: 77
End: 2017-09-07
Payer: MEDICARE

## 2017-09-07 ENCOUNTER — HOSPITAL ENCOUNTER (OUTPATIENT)
Dept: RADIOLOGY | Facility: HOSPITAL | Age: 77
Discharge: HOME | End: 2017-09-07
Attending: NUCLEAR MEDICINE
Payer: MEDICARE

## 2017-09-07 DIAGNOSIS — E11.59 HYPERTENSION ASSOCIATED WITH DIABETES: Chronic | ICD-10-CM

## 2017-09-07 DIAGNOSIS — E11.69 COMBINED HYPERLIPIDEMIA ASSOCIATED WITH TYPE 2 DIABETES MELLITUS: Chronic | ICD-10-CM

## 2017-09-07 DIAGNOSIS — I15.2 HYPERTENSION ASSOCIATED WITH DIABETES: Chronic | ICD-10-CM

## 2017-09-07 DIAGNOSIS — E78.2 COMBINED HYPERLIPIDEMIA ASSOCIATED WITH TYPE 2 DIABETES MELLITUS: Chronic | ICD-10-CM

## 2017-09-07 DIAGNOSIS — I25.10 CORONARY ARTERY DISEASE, OCCLUSIVE: Chronic | ICD-10-CM

## 2017-09-07 DIAGNOSIS — I70.0 AORTIC CALCIFICATION: Chronic | ICD-10-CM

## 2017-09-07 LAB — DIASTOLIC DYSFUNCTION: NO

## 2017-09-07 PROCEDURE — 78452 HT MUSCLE IMAGE SPECT MULT: CPT | Mod: 26,,, | Performed by: INTERNAL MEDICINE

## 2017-09-07 PROCEDURE — 78452 HT MUSCLE IMAGE SPECT MULT: CPT | Mod: TC,PO

## 2017-09-07 PROCEDURE — 93015 CV STRESS TEST SUPVJ I&R: CPT | Mod: S$GLB,,, | Performed by: INTERNAL MEDICINE

## 2017-09-22 ENCOUNTER — HOSPITAL ENCOUNTER (OUTPATIENT)
Dept: RADIOLOGY | Facility: HOSPITAL | Age: 77
Discharge: HOME | End: 2017-09-22
Attending: ORTHOPAEDIC SURGERY
Payer: MEDICARE

## 2017-09-22 DIAGNOSIS — Z01.818 PRE-OP TESTING: ICD-10-CM

## 2017-09-22 PROCEDURE — 71020 XR CHEST PA AND LATERAL: CPT | Mod: TC,PO

## 2017-09-22 PROCEDURE — 71020 XR CHEST PA AND LATERAL: CPT | Mod: 26,,, | Performed by: RADIOLOGY

## 2017-09-26 ENCOUNTER — OFFICE VISIT (OUTPATIENT)
Dept: INTERNAL MEDICINE | Facility: CLINIC | Age: 77
End: 2017-09-26
Payer: MEDICARE

## 2017-09-26 VITALS
HEART RATE: 70 BPM | WEIGHT: 222.44 LBS | HEIGHT: 75 IN | BODY MASS INDEX: 27.66 KG/M2 | SYSTOLIC BLOOD PRESSURE: 116 MMHG | DIASTOLIC BLOOD PRESSURE: 70 MMHG | TEMPERATURE: 97 F

## 2017-09-26 DIAGNOSIS — E11.59 HYPERTENSION ASSOCIATED WITH DIABETES: Chronic | ICD-10-CM

## 2017-09-26 DIAGNOSIS — E11.42 TYPE 2 DIABETES MELLITUS WITH DIABETIC POLYNEUROPATHY, WITHOUT LONG-TERM CURRENT USE OF INSULIN: ICD-10-CM

## 2017-09-26 DIAGNOSIS — M16.12 PRIMARY OSTEOARTHRITIS OF LEFT HIP: ICD-10-CM

## 2017-09-26 DIAGNOSIS — E78.2 COMBINED HYPERLIPIDEMIA ASSOCIATED WITH TYPE 2 DIABETES MELLITUS: Chronic | ICD-10-CM

## 2017-09-26 DIAGNOSIS — G47.33 OSA (OBSTRUCTIVE SLEEP APNEA): ICD-10-CM

## 2017-09-26 DIAGNOSIS — I25.10 CORONARY ARTERY DISEASE, OCCLUSIVE: ICD-10-CM

## 2017-09-26 DIAGNOSIS — Z01.818 PREOP EXAM FOR INTERNAL MEDICINE: Primary | ICD-10-CM

## 2017-09-26 DIAGNOSIS — N28.9 RENAL INSUFFICIENCY: ICD-10-CM

## 2017-09-26 DIAGNOSIS — I15.2 HYPERTENSION ASSOCIATED WITH DIABETES: Chronic | ICD-10-CM

## 2017-09-26 DIAGNOSIS — E11.69 COMBINED HYPERLIPIDEMIA ASSOCIATED WITH TYPE 2 DIABETES MELLITUS: Chronic | ICD-10-CM

## 2017-09-26 PROCEDURE — 3008F BODY MASS INDEX DOCD: CPT | Mod: S$GLB,,, | Performed by: INTERNAL MEDICINE

## 2017-09-26 PROCEDURE — 3074F SYST BP LT 130 MM HG: CPT | Mod: S$GLB,,, | Performed by: INTERNAL MEDICINE

## 2017-09-26 PROCEDURE — 99214 OFFICE O/P EST MOD 30 MIN: CPT | Mod: S$GLB,,, | Performed by: INTERNAL MEDICINE

## 2017-09-26 PROCEDURE — 3078F DIAST BP <80 MM HG: CPT | Mod: S$GLB,,, | Performed by: INTERNAL MEDICINE

## 2017-09-26 PROCEDURE — 1126F AMNT PAIN NOTED NONE PRSNT: CPT | Mod: S$GLB,,, | Performed by: INTERNAL MEDICINE

## 2017-09-26 PROCEDURE — 1159F MED LIST DOCD IN RCRD: CPT | Mod: S$GLB,,, | Performed by: INTERNAL MEDICINE

## 2017-09-26 PROCEDURE — 99999 PR PBB SHADOW E&M-EST. PATIENT-LVL III: CPT | Mod: PBBFAC,,, | Performed by: INTERNAL MEDICINE

## 2017-09-26 PROCEDURE — 99499 UNLISTED E&M SERVICE: CPT | Mod: S$GLB,,, | Performed by: INTERNAL MEDICINE

## 2017-09-26 NOTE — PROGRESS NOTES
Subjective:       Patient ID: Srinath Mcknight is a 76 y.o. male.    Chief Complaint: Pre-op Exam    HPI  patient is a 76-year-old male presenting today for perioperative risk assessment requested by Dr. Albaro Alcocer.  Patient is going to be undergoing hip replacement of the left hip on October 9, 2017.  This will be performed under general anesthesia.    The patient has had multiple surgical interventions in the past.  He has not had any significant complications associated with these procedures.  He has never had problems with tolerating anesthesia.  He denies any hemorrhaging.  He denies any DVT or pulmonary emboli.    Patient has a history of coronary artery disease.  He is followed by cardiology.  He had a stress test done in late August which showed no evidence of perfusion abnormalities.  He has been deemed prudent for surgery by the cardiologist without further workup.    Patient has no underlying lung disease.  He has not demonstrated any significant pulmonary symptoms.  There are no lifestyle limiting symptoms.    Patient is a type II diabetic.  His diabetes is shown good control over time.  He is on oral agents for the diabetes.  We discussed holding the agents on the morning of surgery.    Patient has obstructive sleep apnea.  He is done well with his treatment for that.  He has no acute issues associated with the sleep apnea.    Past Medical History:   Diagnosis Date    Anemia due to unknown mechanism 11/10/2015    Angina pectoris 2014    not since    Arthritis     Back pain     Coronary artery disease     Diabetes mellitus 20 years     am 09/29/2014    Diabetes mellitus type II 1994     am 12/22/2015    DM (diabetes mellitus) 1994    BS 78 am 01/10/2017    Hyperlipemia     Hypertension     CHARLES (obstructive sleep apnea)     Spinal cord disease     Trouble in sleeping     Type 2 diabetes mellitus with diabetic polyneuropathy, without long-term current use of insulin      Past  Surgical History:   Procedure Laterality Date    HERNIA REPAIR Bilateral 04/18/2017    LAMINECTOMY  07/22/2016    ROTATOR CUFF REPAIR Left 2006    SHOULDER ARTHROSCOPY W/ ROTATOR CUFF REPAIR Right 2009     Social History   Substance Use Topics    Smoking status: Never Smoker    Smokeless tobacco: Never Used    Alcohol use 0.6 oz/week     1 Glasses of wine per week      Comment: rarely      Family History   Problem Relation Age of Onset    Hypertension Mother     Heart disease Mother     Diabetes Mother     Hypertension Father     Heart disease Father     Diabetes Father     Stroke Father     Diabetes Sister     Cancer Brother 50     pancreas    Drug abuse Brother     Prostate cancer Neg Hx     Macular degeneration Neg Hx     Retinal detachment Neg Hx     Strabismus Neg Hx     Glaucoma Neg Hx     Blindness Neg Hx     Amblyopia Neg Hx     Kidney disease Neg Hx     Mental illness Neg Hx     Mental retardation Neg Hx     COPD Neg Hx     Asthma Neg Hx      Review of patient's allergies indicates:   Allergen Reactions    Sulfa (sulfonamide antibiotics)      Can't recall from 1995     Current Outpatient Prescriptions   Medication Sig Dispense Refill    acetaminophen (TYLENOL) 650 MG TbSR Take 1,300 mg by mouth every 8 (eight) hours.      aspirin (ECOTRIN) 81 MG EC tablet Take 81 mg by mouth once daily.      blood sugar diagnostic (ACCU-CHEK FRNAKO PLUS TEST STRP) Strp USE  1  STRIP ONE TIME DAILY 100 strip 4    celecoxib (CELEBREX) 200 MG capsule Take 1 capsule (200 mg total) by mouth once daily. 90 capsule 3    clonazePAM (KLONOPIN) 0.25 MG TbDL Take 2 tablets (0.5 mg total) by mouth nightly. 90 tablet 0    docusate sodium (COLACE) 100 MG capsule Take 1 capsule (100 mg total) by mouth 2 (two) times daily as needed. 60 capsule 0    gabapentin (NEURONTIN) 100 MG capsule TAKE 1 CAPSULE BY MOUTH EVERY NIGHT AT BEDTIME FOR 1 WEEK, THEN INCREASE TO 2 CAPSULES EVERY NIGHT AT BEDTIME THEREAFTER  180 capsule 1    glipiZIDE (GLUCOTROL) 5 MG tablet TAKE 2 TABLETS (10 MG) TWO TIMES DAILY BEFORE MEALS 360 tablet 4    lancets (ACCU-CHEK MULTICLIX LANCET) Misc TEST ONE TIME DAILY 100 each 3    losartan-hydrochlorothiazide 50-12.5 mg (HYZAAR) 50-12.5 mg per tablet TAKE 1 TABLET ONE TIME DAILY 90 tablet 4    melatonin 5 mg Cap       metformin (GLUCOPHAGE) 1000 MG tablet TAKE 1 TABLET TWICE DAILY WITH MEALS 180 tablet 4    metoprolol succinate (TOPROL-XL) 50 MG 24 hr tablet TAKE 1 TABLET EVERY DAY 90 tablet 3    oxycodone-acetaminophen (PERCOCET)  mg per tablet Take 1 tablet by mouth every 4 (four) hours as needed. 90 tablet 0    oxycodone-acetaminophen (PERCOCET) 5-325 mg per tablet Take 1 tablet by mouth every 4 (four) hours as needed for Pain. 65 tablet 0    pramipexole (MIRAPEX) 0.25 MG tablet Take 1 tablet (0.25 mg total) by mouth every evening. 30 tablet 5    pravastatin (PRAVACHOL) 40 MG tablet TAKE 1 TABLET EVERY DAY 90 tablet 4    senna (SENNA CONCENTRATE) 8.6 mg tablet Take 1 tablet by mouth once daily. 60 tablet 0    sildenafil (VIAGRA) 100 MG tablet Take 1 tablet (100 mg total) by mouth as needed. Take on an empty stomach 6 tablet 11     No current facility-administered medications for this visit.        Review of Systems   Constitutional: Negative for fever and unexpected weight change.   HENT: Negative for hearing loss, postnasal drip and rhinorrhea.    Eyes: Negative for pain and visual disturbance.   Respiratory: Negative for cough, shortness of breath and wheezing.    Cardiovascular: Negative for chest pain and palpitations.   Gastrointestinal: Negative for constipation, diarrhea, nausea and vomiting.   Genitourinary: Negative for dysuria and hematuria.   Musculoskeletal: Positive for arthralgias. Negative for back pain, myalgias and neck stiffness.   Skin: Negative for pallor and rash.   Neurological: Negative for seizures, syncope and headaches.   Hematological: Negative for  "adenopathy.   Psychiatric/Behavioral: Negative for dysphoric mood. The patient is not nervous/anxious.        Objective:   /70   Pulse 70   Temp 97 °F (36.1 °C) (Tympanic)   Ht 6' 3" (1.905 m)   Wt 100.9 kg (222 lb 7.1 oz)   BMI 27.80 kg/m²      Physical Exam   Constitutional: He is oriented to person, place, and time. He appears well-developed and well-nourished. No distress.   HENT:   Head: Normocephalic and atraumatic.   Mouth/Throat: Oropharynx is clear and moist.   Eyes: EOM are normal. Pupils are equal, round, and reactive to light.   Neck: Normal range of motion. Neck supple. No JVD present. No thyromegaly present.   Cardiovascular: Normal rate, regular rhythm, normal heart sounds and intact distal pulses.  Exam reveals no gallop and no friction rub.    No murmur heard.  Pulmonary/Chest: Effort normal and breath sounds normal. He has no wheezes. He has no rales.   Abdominal: Soft. Bowel sounds are normal. He exhibits no distension. There is no tenderness. There is no rebound and no guarding.   Musculoskeletal: Normal range of motion. He exhibits no edema.   Lymphadenopathy:     He has no cervical adenopathy.   Neurological: He is alert and oriented to person, place, and time. He has normal reflexes. No cranial nerve deficit.   Skin: Skin is warm and dry. No rash noted.   Psychiatric: He has a normal mood and affect. Judgment normal.   Vitals reviewed.      Lab Visit on 09/22/2017   Component Date Value    WBC 09/22/2017 4.63     RBC 09/22/2017 4.52*    Hemoglobin 09/22/2017 13.0*    Hematocrit 09/22/2017 39.5*    MCV 09/22/2017 87     MCH 09/22/2017 28.8     MCHC 09/22/2017 32.9     RDW 09/22/2017 14.4     Platelets 09/22/2017 245     MPV 09/22/2017 10.3     Gran # 09/22/2017 1.7*    Lymph # 09/22/2017 2.2     Mono # 09/22/2017 0.6     Eos # 09/22/2017 0.2     Baso # 09/22/2017 0.03     Gran% 09/22/2017 36.1*    Lymph% 09/22/2017 47.3     Mono% 09/22/2017 12.1     Eosinophil% " 09/22/2017 3.9     Basophil% 09/22/2017 0.6     Differential Method 09/22/2017 Automated     Sodium 09/22/2017 140     Potassium 09/22/2017 4.4     Chloride 09/22/2017 103     CO2 09/22/2017 25     Glucose 09/22/2017 189*    BUN, Bld 09/22/2017 19     Creatinine 09/22/2017 1.5*    Calcium 09/22/2017 9.4     Total Protein 09/22/2017 7.3     Albumin 09/22/2017 3.7     Total Bilirubin 09/22/2017 0.4     Alkaline Phosphatase 09/22/2017 56     AST 09/22/2017 22     ALT 09/22/2017 21     Anion Gap 09/22/2017 12     eGFR if  09/22/2017 52*    eGFR if non African Amer* 09/22/2017 45*       Assessment:       1. Preop exam for internal medicine    2. Primary osteoarthritis of left hip    3. Coronary artery disease, occlusive    4. Type 2 diabetes mellitus with diabetic polyneuropathy, without long-term current use of insulin    5. Hypertension associated with diabetes    6. Combined hyperlipidemia associated with type 2 diabetes mellitus    7. CHARLES (obstructive sleep apnea)    8. Renal insufficiency        Plan:   No problem-specific Assessment & Plan notes found for this encounter.    [place] was seen today for pre-op exam.    Diagnoses and all orders for this visit:    Preop exam for internal medicine  Comments:  Hold celebrex, aspirin one week before surgery.  Losartan and glipizide do not take the morning of surgery    Primary osteoarthritis of left hip    Coronary artery disease, occlusive    Type 2 diabetes mellitus with diabetic polyneuropathy, without long-term current use of insulin    Hypertension associated with diabetes  Comments:  Hold Losartan on the morning of surgery    Combined hyperlipidemia associated with type 2 diabetes mellitus    CHARLES (obstructive sleep apnea)    Renal insufficiency  Comments:  check bmp on 10/5  Orders:  -     Basic metabolic panel; Future     I have reviewed the patient's past medical history, physical exam findings and the proposed procedure.  I  make the following recommendations.    From a cardiac standpoint the patient does have known underlying coronary artery disease but had a normal nuclear stress test is a couple weeks ago.  He has been reviewed by his cardiologist and is been deemed a low risk cardiac candidate for surgery.  No further cardiac workup is required prior to proceeding.    From a pulmonary standpoint patient presents as a good candidate for surgery.  He has no underlying lung disease.  Good pulmonary toilet, incentive spirometry, early ambulation are all recommended to maximize pulmonary outcome.    DVT prophylaxis as per standard.    Patient is instructed to hold his Celebrex and aspirin starting 1 week prior to surgery.  He will hold his losartan and glipizide on the morning of surgery.    Anesthesia should monitor blood sugars perioperatively and adjust as necessary.  He may resume his normal medications for his diabetes upon resuming by mouth intake.    Patient's creatinine was slightly elevated on his lab work that was performed preoperatively.  This would've represented mild stage III kidney disease.  He does not have a history of consistent abnormal creatinines.  I'm suspicious this lab is just a slight abnormality which may resolve itself.  We will recheck his BMP the week before surgery.    Given his history of sleep apnea patient should be monitored postoperatively for evidence of apneic episodes.  BiPAP may be indicated postoperatively as he comes out of anesthesia if it is noted that he has apneic episodes.    If there is anything further I can do to assist in this patient's care please not hesitate contact me.    Return if symptoms worsen or fail to improve.

## 2017-09-29 ENCOUNTER — TELEPHONE (OUTPATIENT)
Dept: ORTHOPEDICS | Facility: CLINIC | Age: 77
End: 2017-09-29

## 2017-09-29 DIAGNOSIS — Z01.818 PRE-OP TESTING: Primary | ICD-10-CM

## 2017-09-29 DIAGNOSIS — Z98.890 POST-OPERATIVE STATE: Primary | ICD-10-CM

## 2017-09-29 NOTE — TELEPHONE ENCOUNTER
Pt contacted to schedule urinalysis due to previous urinalysis being out of date relative to surgery. Pt was offered lab appointment after completing [employer] on 10/3/2017. Pt accepted and verbalized understanding.

## 2017-10-02 DIAGNOSIS — Z01.818 PRE-OPERATIVE EXAM: Primary | ICD-10-CM

## 2017-10-03 ENCOUNTER — HOSPITAL ENCOUNTER (OUTPATIENT)
Dept: PREADMISSION TESTING | Facility: HOSPITAL | Age: 77
Discharge: HOME | End: 2017-10-03
Attending: ORTHOPAEDIC SURGERY
Payer: MEDICARE

## 2017-10-03 VITALS
BODY MASS INDEX: 27.35 KG/M2 | HEIGHT: 75 IN | TEMPERATURE: 98 F | WEIGHT: 220 LBS | HEART RATE: 66 BPM | DIASTOLIC BLOOD PRESSURE: 71 MMHG | OXYGEN SATURATION: 97 % | RESPIRATION RATE: 20 BRPM | SYSTOLIC BLOOD PRESSURE: 156 MMHG

## 2017-10-03 RX ORDER — MULTIVITAMIN
1 TABLET ORAL DAILY
COMMUNITY

## 2017-10-03 NOTE — HISTORY AND PHYSICAL ADDENDUM
Preoperative History and Physical                                                             Hospital Medicine      Chief Complaint: left hip replacement    History of Present Illness:      Srinath Mcknight is a 76 y.o. male with history of HLD, HTN, CAD, DM, and AOCD who presents to the office today for a preoperative consultation at the request of Dr. Alcocer who plans on performing left hip arthroplasty  on October 9.     Functional Status:      The patient is able to climb a flight of stairs very slowly with support. The patient is able to ambulate short distances without difficulty. The patient's functional status is affected by their joint pain. The patient's functional status is affected by exertional shortness of breath.  Denies chest pain, dyspnea on exertion and fatigue. Patient was seen by Dr. Cobos 9/13/17 and was cleared for surgery. Nuclear Stress Test was negative for myocardial ischemia.    Past Medical History:      Past Medical History:   Diagnosis Date    Anemia due to unknown mechanism 11/10/2015    Angina pectoris 2014    not since    Arthritis     Back pain     Coronary artery disease     Diabetes mellitus 20 years     am 09/29/2014    Diabetes mellitus type II 1994     am 12/22/2015    DM (diabetes mellitus) 1994    BS 78 am 01/10/2017    Hyperlipemia     Hypertension     CHARLES (obstructive sleep apnea)     Spinal cord disease     Trouble in sleeping     Type 2 diabetes mellitus with diabetic polyneuropathy, without long-term current use of insulin         Past Surgical History:      Past Surgical History:   Procedure Laterality Date    HERNIA REPAIR Bilateral 04/18/2017    LAMINECTOMY  07/22/2016    ROTATOR CUFF REPAIR Left 2006    SHOULDER ARTHROSCOPY W/ ROTATOR CUFF REPAIR Right 2009        Social History:      Social History     Social History    Marital status:      Spouse name: N/A    Number of children: 0    Years of education: N/A      Occupational History    retired      teacher     Social History Main Topics    Smoking status: Never Smoker    Smokeless tobacco: Never Used    Alcohol use 0.6 oz/week     1 Glasses of wine per week      Comment: rarely  No alcohol prior to surgery    Drug use: No    Sexual activity: Yes     Partners: Female     Birth control/ protection: Condom     Other Topics Concern    Not on file     Social History Narrative    Single lives alone. No children. Retired but some part time work - 4 hours a day. 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 Caffeine intake - sugar free cola, Rare coffee intake. Still drives. Does have a Living Will . Has a nephew Jt Gayle, lives in [place]. Has a friend Karina Rossi, locally who could help him.         Family History:      Family History   Problem Relation Age of Onset    Hypertension Mother     Heart disease Mother     Diabetes Mother     Hypertension Father     Heart disease Father     Diabetes Father     Stroke Father     Diabetes Sister     Cancer Brother 50     pancreas    Drug abuse Brother     Prostate cancer Neg Hx     Macular degeneration Neg Hx     Retinal detachment Neg Hx     Strabismus Neg Hx     Glaucoma Neg Hx     Blindness Neg Hx     Amblyopia Neg Hx     Kidney disease Neg Hx     Mental illness Neg Hx     Mental retardation Neg Hx     COPD Neg Hx     Asthma Neg Hx        Allergies:      Review of patient's allergies indicates:   Allergen Reactions    Sulfa (sulfonamide antibiotics)      Can't recall from 1995       Medications:      Current Outpatient Prescriptions   Medication Sig    acetaminophen (TYLENOL) 650 MG TbSR Take 1,300 mg by mouth every 8 (eight) hours.    aspirin (ECOTRIN) 81 MG EC tablet Take 81 mg by mouth once daily.    blood sugar diagnostic (ACCU-CHEK FRANKO PLUS TEST STRP) Strp USE  1  STRIP ONE TIME DAILY    celecoxib (CELEBREX) 200 MG capsule Take 1 capsule (200 mg total) by mouth once daily.    clonazePAM  (KLONOPIN) 0.25 MG TbDL Take 2 tablets (0.5 mg total) by mouth nightly.    docusate sodium (COLACE) 100 MG capsule Take 1 capsule (100 mg total) by mouth 2 (two) times daily as needed.    gabapentin (NEURONTIN) 100 MG capsule TAKE 1 CAPSULE BY MOUTH EVERY NIGHT AT BEDTIME FOR 1 WEEK, THEN INCREASE TO 2 CAPSULES EVERY NIGHT AT BEDTIME THEREAFTER    glipiZIDE (GLUCOTROL) 5 MG tablet TAKE 2 TABLETS (10 MG) TWO TIMES DAILY BEFORE MEALS    lancets (ACCU-CHEK MULTICLIX LANCET) Misc TEST ONE TIME DAILY    losartan-hydrochlorothiazide 50-12.5 mg (HYZAAR) 50-12.5 mg per tablet TAKE 1 TABLET ONE TIME DAILY    melatonin 5 mg Cap     metformin (GLUCOPHAGE) 1000 MG tablet TAKE 1 TABLET TWICE DAILY WITH MEALS    metoprolol succinate (TOPROL-XL) 50 MG 24 hr tablet TAKE 1 TABLET EVERY DAY    multivitamin (ONE DAILY MULTIVITAMIN) per tablet Take 1 tablet by mouth once daily.    oxycodone-acetaminophen (PERCOCET)  mg per tablet Take 1 tablet by mouth every 4 (four) hours as needed.    oxycodone-acetaminophen (PERCOCET) 5-325 mg per tablet Take 1 tablet by mouth every 4 (four) hours as needed for Pain.    pramipexole (MIRAPEX) 0.25 MG tablet Take 1 tablet (0.25 mg total) by mouth every evening.    pravastatin (PRAVACHOL) 40 MG tablet TAKE 1 TABLET EVERY DAY (Patient taking differently: TAKE 1 TABLET EVERY NIGHT)    senna (SENNA CONCENTRATE) 8.6 mg tablet Take 1 tablet by mouth once daily.    sildenafil (VIAGRA) 100 MG tablet Take 1 tablet (100 mg total) by mouth as needed. Take on an empty stomach     No current facility-administered medications for this encounter.        Vitals:    /71  T 98.4  R 20  HR 66  Oxygen: 97%      Review of Systems:    Review of Systems   Constitutional: Negative.    HENT: Negative.    Eyes: Negative.    Respiratory: Positive for shortness of breath (exertional).    Cardiovascular: Negative.    Gastrointestinal: Positive for constipation.   Genitourinary: Negative.     Musculoskeletal: Positive for joint pain and myalgias.   Skin: Negative.    Neurological: Negative.    Psychiatric/Behavioral: Negative.            Physical Exam:    Constitutional: Appears well-developed, well-nourished and in no acute distress.  Pt is oriented to person, place, and time.   Head: Normocephalic and atraumatic. Mucous membranes moist.  Neck: Neck supple no mass.   Cardiovascular: Normal rate and regular rhythm.  S1 S2 appreciated by ascultation.  Pulmonary/Chest: Effort normal and clear to auscultation bilaterally. No respiratory distress.   Abdomen: Soft. Non-tender and non-distended. Bowel sounds are normal.   Neurological: Pt is alert and oriented to person, place, and time. Moves all extremities.  Skin: Warm and dry. No lesions.  Extremities: No clubbing cyanosis or edema.    Laboratory data:      Reviewed and noted in plan where applicable. Please see chart for full laboratory data.    No results for input(s): CPK, CPKMB, TROPONINI, MB in the last 24 hours. No results for input(s): POCTGLUCOSE in the last 24 hours.     Lab Results   Component Value Date    INR 0.9 07/19/2016    INR 1.0 03/10/2015       Lab Results   Component Value Date    WBC 4.63 09/22/2017    HGB 13.0 (L) 09/22/2017    HCT 39.5 (L) 09/22/2017    MCV 87 09/22/2017     09/22/2017       No results for input(s): GLU, NA, K, CL, CO2, BUN, CREATININE, CALCIUM, MG in the last 24 hours.    Predictors of intubation difficulty:       Morbid obesity? no   Anatomically abnormal facies? no   Prominent incisors? no   Receding mandible? no   Short, thick neck? no   Neck range of motion: normal   Dentition: No chipped, loose, or missing teeth.    Cardiographics:      ECG: Normal sinus rhythm  Nonspecific T wave abnormality  Echocardiogram: not done    Imaging:      Chest x-ray: .  No acute cardiopulmonary process.    Assessment:      76 y.o. male with planned surgery as above.    Known risk factors for perioperative complications:  Anemia  Coronary disease  Diabetes mellitus    Difficulty with intubation is not anticipated.    Cardiac Risk Estimation: Moderate risk for cardiac event.     Plan:      1.) Preoperative workup as follows: cardiac stress testing to exclude undiagnosed coronary disease, chest x-ray, ECG, hemoglobin, hematocrit, electrolytes.  2.) Change in medication regimen before surgery: discontinue ASA 14 days before surgery and discontinue Metformin 24 hours before surgery.  3.) Prophylaxis for cardiac events with perioperative beta-blockers: Metoprolol.  4.) Invasive hemodynamic monitoring perioperatively: not indicated.  5.) Deep vein thrombosis prophylaxis postoperatively: regimen to be chosen by surgical team.  6.) Surveillance for postoperative MI with ECG immediately postoperatively and on postoperative days 1 and 2 AND troponin levels 24 hours postoperatively and on day 4 or hospital discharge (whichever comes first): not indicated.  7.) Current medications which may produce withdrawal symptoms if withheld perioperatively: NA  8.) Other measures: Postoperative incentive spirometry to prevent pneumonia.      1. Left HIP OA- okay to proceed with surgery as patient with negative stress test. Preoperative labs are stable.     2. Constipation- admits to chronic constipation. Will need to be placed on gentle laxative and stool softener.    3. DM- HgbA1c 6.4. Hold Metformin and Glipizide morning of surgery.      4 CAD- s/p nuclear stress test which was negative. Patient will continue Metoprolol morning of surgery.

## 2017-10-03 NOTE — PLAN OF CARE
CM met with patient ortho ot [place].  Patient states that he lives alone at home and feels that he would need to go to a facility where he can get around the clock care.  Patient inquired about inpatient rehab.  CM informed that he may not meet medical criteria for inpatient rehab.  CM discussed SNF as an option.  Patient states that he would be more comfortable going to a SNF rather than home with home health.  CM answered all questions.

## 2017-10-03 NOTE — PROGRESS NOTES
Preoperative History and Physical                                                             Hospital Medicine      Chief Complaint:     History of Present Illness:      Srinath Mcknight is a 76 y.o. male who presents to the office today for a preoperative consultation at the request of *** who plans on performing *** on {month:10108} {1-31:59047}.     Functional Status:      The patient {is/is not:9024} able to climb a flight of stairs. The patient is able to ambulate *** without difficulty. The patient's functional status {is/is not:9024} affected by their joint pain. The patient's functional status {is/is not:9024} affected by shortness of breath, chest pain, dyspnea on exertion and fatigue.      Past Medical History:      Past Medical History:   Diagnosis Date    Anemia due to unknown mechanism 11/10/2015    Angina pectoris 2014    not since    Arthritis     Back pain     Coronary artery disease     Diabetes mellitus 20 years     am 09/29/2014    Diabetes mellitus type II 1994     am 12/22/2015    DM (diabetes mellitus) 1994    BS 80 am 01/10/2017    Hyperlipemia     Hypertension     CHARLES (obstructive sleep apnea)     Spinal cord disease     Trouble in sleeping     Type 2 diabetes mellitus with diabetic polyneuropathy, without long-term current use of insulin         Past Surgical History:      Past Surgical History:   Procedure Laterality Date    HERNIA REPAIR Bilateral 04/18/2017    LAMINECTOMY  07/22/2016    ROTATOR CUFF REPAIR Left 2006    SHOULDER ARTHROSCOPY W/ ROTATOR CUFF REPAIR Right 2009        Social History:      Social History     Social History    Marital status:      Spouse name: N/A    Number of children: 0    Years of education: N/A     Occupational History    retired      teacher     Social History Main Topics    Smoking status: Never Smoker    Smokeless tobacco: Never Used    Alcohol use 0.6 oz/week     1 Glasses of wine per week      Comment:  rarely     Drug use: No    Sexual activity: Yes     Partners: Female     Birth control/ protection: Condom     Other Topics Concern    Not on file     Social History Narrative    Single lives alone. No children. Retired but some part time work - 4 hours a day. 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 Caffeine intake - sugar free cola, Rare coffee intake. Still drives. Does have a Living Will . Has a nephew Jt Gayle, lives in [place]. Has a friend Karina Rossi, locally who could help him.         Family History:      Family History   Problem Relation Age of Onset    Hypertension Mother     Heart disease Mother     Diabetes Mother     Hypertension Father     Heart disease Father     Diabetes Father     Stroke Father     Diabetes Sister     Cancer Brother 50     pancreas    Drug abuse Brother     Prostate cancer Neg Hx     Macular degeneration Neg Hx     Retinal detachment Neg Hx     Strabismus Neg Hx     Glaucoma Neg Hx     Blindness Neg Hx     Amblyopia Neg Hx     Kidney disease Neg Hx     Mental illness Neg Hx     Mental retardation Neg Hx     COPD Neg Hx     Asthma Neg Hx        Allergies:      Review of patient's allergies indicates:   Allergen Reactions    Sulfa (sulfonamide antibiotics)      Can't recall from 1995       Medications:      Current Outpatient Prescriptions   Medication Sig    acetaminophen (TYLENOL) 650 MG TbSR Take 1,300 mg by mouth every 8 (eight) hours.    aspirin (ECOTRIN) 81 MG EC tablet Take 81 mg by mouth once daily.    blood sugar diagnostic (ACCU-CHEK FRANKO PLUS TEST STRP) Strp USE  1  STRIP ONE TIME DAILY    celecoxib (CELEBREX) 200 MG capsule Take 1 capsule (200 mg total) by mouth once daily.    clonazePAM (KLONOPIN) 0.25 MG TbDL Take 2 tablets (0.5 mg total) by mouth nightly.    docusate sodium (COLACE) 100 MG capsule Take 1 capsule (100 mg total) by mouth 2 (two) times daily as needed.    gabapentin (NEURONTIN) 100 MG capsule TAKE 1 CAPSULE BY MOUTH  EVERY NIGHT AT BEDTIME FOR 1 WEEK, THEN INCREASE TO 2 CAPSULES EVERY NIGHT AT BEDTIME THEREAFTER    glipiZIDE (GLUCOTROL) 5 MG tablet TAKE 2 TABLETS (10 MG) TWO TIMES DAILY BEFORE MEALS    lancets (ACCU-CHEK MULTICLIX LANCET) Misc TEST ONE TIME DAILY    losartan-hydrochlorothiazide 50-12.5 mg (HYZAAR) 50-12.5 mg per tablet TAKE 1 TABLET ONE TIME DAILY    melatonin 5 mg Cap     metformin (GLUCOPHAGE) 1000 MG tablet TAKE 1 TABLET TWICE DAILY WITH MEALS    metoprolol succinate (TOPROL-XL) 50 MG 24 hr tablet TAKE 1 TABLET EVERY DAY    oxycodone-acetaminophen (PERCOCET)  mg per tablet Take 1 tablet by mouth every 4 (four) hours as needed.    oxycodone-acetaminophen (PERCOCET) 5-325 mg per tablet Take 1 tablet by mouth every 4 (four) hours as needed for Pain.    pramipexole (MIRAPEX) 0.25 MG tablet Take 1 tablet (0.25 mg total) by mouth every evening.    pravastatin (PRAVACHOL) 40 MG tablet TAKE 1 TABLET EVERY DAY    senna (SENNA CONCENTRATE) 8.6 mg tablet Take 1 tablet by mouth once daily.    sildenafil (VIAGRA) 100 MG tablet Take 1 tablet (100 mg total) by mouth as needed. Take on an empty stomach     No current facility-administered medications for this encounter.        Vitals:          Review of Systems:        Positive for:   All 10 systems reviewed and negative except as noted above.    Physical Exam:      Constitutional: Appears well-developed, well-nourished and in no acute distress.  Pt is oriented to person, place, and time.   Head: Normocephalic and atraumatic. Mucous membranes moist.  Neck: Neck supple no mass.   Cardiovascular: Normal rate and regular rhythm.  S1 S2 appreciated by ascultation.  Pulmonary/Chest: Effort normal and clear to auscultation bilaterally. No respiratory distress.   Abdomen: Soft. Non-tender and non-distended. Bowel sounds are normal.   Neurological: Pt is alert and oriented to person, place, and time. Moves all extremities.  Skin: Warm and dry. No  "lesions.  Extremities: No clubbing cyanosis or edema.    Laboratory data:      Reviewed and noted in plan where applicable. Please see chart for full laboratory data.    No results for input(s): CPK, CPKMB, TROPONINI, MB in the last 24 hours. No results for input(s): POCTGLUCOSE in the last 24 hours.     Lab Results   Component Value Date    INR 0.9 2016    INR 1.0 03/10/2015       Lab Results   Component Value Date    WBC 4.63 2017    HGB 13.0 (L) 2017    HCT 39.5 (L) 2017    MCV 87 2017     2017       No results for input(s): GLU, NA, K, CL, CO2, BUN, CREATININE, CALCIUM, MG in the last 24 hours.    Predictors of intubation difficulty:       Morbid obesity? {yes***/no:23843::"no"}   Anatomically abnormal facies? {yes***/no:79599::"no"}   Prominent incisors? {yes***/no:07192::"no"}   Receding mandible? {yes***/no:40238::"no"}   Short, thick neck? {yes***/no:49092::"no"}   Neck range of motion: {normal/abnormal:91279::"normal"}   Dentition: {exam; teeth:73015}    Cardiographics:      ECG: {findings; ec}  Echocardiogram: {findings; echo:29941}    Imaging:      Chest x-ray: {findings; x-ray:89612}     Assessment:      76 y.o. male with planned surgery as above.    Known risk factors for perioperative complications: {risk factors:08387::"None"}    Difficulty with intubation {is/is not:9024} anticipated.    Cardiac Risk Estimation: ***    Plan:      1.) Preoperative workup as follows: {studies; preop:26119}.  2.) Change in medication regimen before surgery: {meds; preop:30798}.  3.) Prophylaxis for cardiac events with perioperative beta-blockers: {preop beta-blockers:86551}.  4.) Invasive hemodynamic monitoring perioperatively: {not indicated/strongly advised:24757}.  5.) Deep vein thrombosis prophylaxis postoperatively: {dvt prophylaxis:58288}.  6.) Surveillance for postoperative MI with ECG immediately postoperatively and on postoperative days 1 and 2 AND troponin " levels 24 hours postoperatively and on day 4 or hospital discharge (whichever comes first): {not indicated/strongly advised:99071}.  7.) Current medications which may produce withdrawal symptoms if withheld perioperatively: ***  8.) Other measures: {preop recommendations:28211}

## 2017-10-03 NOTE — DISCHARGE INSTRUCTIONS
To confirm, Your doctor has instructed you that surgery is scheduled for 10/09/17  at  11:00 a.m.       Please report to [facility], [address], 1st floor, main lobby by 09:30 a.m  Pre admit office to call Friday afternoon prior to surgery with final arrival time.      INSTRUCTIONS IMPORTANT!!!   Do not eat, drink, or smoke after 12 midnight-including water. OK to brush teeth, no gum, candy or mints!    ¨ Take only these medicines with a small swallow of water-morning of surgery.  Metoprolol    Hold Metformin 24 hours prior to surgery   Hold Aspirin 7 days prior to surgery      Pre operative instructions:  Please review the Pre-Operative Instruction booklet that you were given.        Bathing Instructions--See page 6 in the Pre-operative booklet.      Prevention of surgical site infections:     -Keep incisions clean and dry.   -Do not soak/submerge incisions in water until completely healed.   -Do not apply lotions, powders, creams, or deodorants to site.   -Always make sure hands are cleaned with antibacterial soap/ alcohol-based                 prior to touching the surgical site.  (This includes doctors,                 nurses, staff, and yourself.)    Signs and symptoms:   -Redness and pain around the area where you had surgery   -Drainage of cloudy fluid from your surgical wound   -Fever over 100.4       I have read or had read and explained to me, and understand the above information.  Additional comments or instructions:  Received a copy of Pre-operative instructions booklet, FAQ surgical site infection sheet, and packets of hibiclens (if indicated).

## 2017-10-04 DIAGNOSIS — G47.50 PARASOMNIA: ICD-10-CM

## 2017-10-04 DIAGNOSIS — G47.61 PLMD (PERIODIC LIMB MOVEMENT DISORDER): ICD-10-CM

## 2017-10-04 RX ORDER — PRAMIPEXOLE DIHYDROCHLORIDE 0.25 MG/1
TABLET ORAL
Qty: 30 TABLET | Refills: 5 | Status: SHIPPED | OUTPATIENT
Start: 2017-10-04 | End: 2018-01-25 | Stop reason: SDUPTHER

## 2017-10-04 RX ORDER — CLONAZEPAM 0.25 MG/1
TABLET, ORALLY DISINTEGRATING ORAL
Qty: 90 TABLET | Refills: 0 | Status: SHIPPED | OUTPATIENT
Start: 2017-10-04 | End: 2018-01-05 | Stop reason: SDUPTHER

## 2017-10-05 ENCOUNTER — APPOINTMENT (OUTPATIENT)
Dept: LAB | Facility: HOSPITAL | Age: 77
End: 2017-10-05
Attending: INTERNAL MEDICINE
Payer: MEDICARE

## 2017-10-05 DIAGNOSIS — N28.9 RENAL INSUFFICIENCY: Primary | ICD-10-CM

## 2017-10-08 ENCOUNTER — ANESTHESIA EVENT (OUTPATIENT)
Dept: SURGERY | Facility: HOSPITAL | Age: 77
DRG: 470 | End: 2017-10-08
Payer: MEDICARE

## 2017-10-09 ENCOUNTER — ANESTHESIA (OUTPATIENT)
Dept: SURGERY | Facility: HOSPITAL | Age: 77
DRG: 470 | End: 2017-10-09
Payer: MEDICARE

## 2017-10-09 ENCOUNTER — HOSPITAL ENCOUNTER (INPATIENT)
Facility: HOSPITAL | Age: 77
LOS: 3 days | Discharge: NURSING, REHAB OR LONG-TERM CARE | DRG: 470 | End: 2017-10-12
Attending: ORTHOPAEDIC SURGERY | Admitting: ORTHOPAEDIC SURGERY
Payer: MEDICARE

## 2017-10-09 DIAGNOSIS — Z98.890 POST-OPERATIVE STATE: Primary | ICD-10-CM

## 2017-10-09 DIAGNOSIS — Z98.890 POST-OPERATIVE STATE: ICD-10-CM

## 2017-10-09 DIAGNOSIS — M16.12 ARTHRITIS OF LEFT HIP: Primary | ICD-10-CM

## 2017-10-09 LAB
ABO + RH BLD: NORMAL
BLD GP AB SCN CELLS X3 SERPL QL: NORMAL
HCT VFR BLD AUTO: 31.8 %
HGB BLD-MCNC: 10.7 G/DL
POCT GLUCOSE: 168 MG/DL (ref 70–110)
POCT GLUCOSE: 176 MG/DL (ref 70–110)
POCT GLUCOSE: 222 MG/DL (ref 70–110)

## 2017-10-09 PROCEDURE — 27201423 OPTIME MED/SURG SUP & DEVICES STERILE SUPPLY: Performed by: ORTHOPAEDIC SURGERY

## 2017-10-09 PROCEDURE — 88304 TISSUE EXAM BY PATHOLOGIST: CPT | Mod: 26,,, | Performed by: PATHOLOGY

## 2017-10-09 PROCEDURE — 86850 RBC ANTIBODY SCREEN: CPT

## 2017-10-09 PROCEDURE — 37000009 HC ANESTHESIA EA ADD 15 MINS: Performed by: ORTHOPAEDIC SURGERY

## 2017-10-09 PROCEDURE — C1776 JOINT DEVICE (IMPLANTABLE): HCPCS | Performed by: ORTHOPAEDIC SURGERY

## 2017-10-09 PROCEDURE — 86920 COMPATIBILITY TEST SPIN: CPT

## 2017-10-09 PROCEDURE — 27130 TOTAL HIP ARTHROPLASTY: CPT | Mod: LT,,, | Performed by: ORTHOPAEDIC SURGERY

## 2017-10-09 PROCEDURE — 63600175 PHARM REV CODE 636 W HCPCS: Performed by: NURSE ANESTHETIST, CERTIFIED REGISTERED

## 2017-10-09 PROCEDURE — 85014 HEMATOCRIT: CPT

## 2017-10-09 PROCEDURE — 37000008 HC ANESTHESIA 1ST 15 MINUTES: Performed by: ORTHOPAEDIC SURGERY

## 2017-10-09 PROCEDURE — 27130 TOTAL HIP ARTHROPLASTY: CPT | Mod: AS,LT,, | Performed by: PHYSICIAN ASSISTANT

## 2017-10-09 PROCEDURE — 83036 HEMOGLOBIN GLYCOSYLATED A1C: CPT

## 2017-10-09 PROCEDURE — 71000033 HC RECOVERY, INTIAL HOUR: Performed by: ORTHOPAEDIC SURGERY

## 2017-10-09 PROCEDURE — 25000003 PHARM REV CODE 250: Performed by: ORTHOPAEDIC SURGERY

## 2017-10-09 PROCEDURE — 25000003 PHARM REV CODE 250: Performed by: ANESTHESIOLOGY

## 2017-10-09 PROCEDURE — 63600175 PHARM REV CODE 636 W HCPCS: Performed by: NURSE PRACTITIONER

## 2017-10-09 PROCEDURE — 25000003 PHARM REV CODE 250: Performed by: NURSE ANESTHETIST, CERTIFIED REGISTERED

## 2017-10-09 PROCEDURE — 36000711: Performed by: ORTHOPAEDIC SURGERY

## 2017-10-09 PROCEDURE — 63600175 PHARM REV CODE 636 W HCPCS: Performed by: ORTHOPAEDIC SURGERY

## 2017-10-09 PROCEDURE — 36415 COLL VENOUS BLD VENIPUNCTURE: CPT

## 2017-10-09 PROCEDURE — 88304 TISSUE EXAM BY PATHOLOGIST: CPT | Performed by: PATHOLOGY

## 2017-10-09 PROCEDURE — 36000710: Performed by: ORTHOPAEDIC SURGERY

## 2017-10-09 PROCEDURE — C9290 INJ, BUPIVACAINE LIPOSOME: HCPCS | Performed by: ORTHOPAEDIC SURGERY

## 2017-10-09 PROCEDURE — 88311 DECALCIFY TISSUE: CPT | Performed by: PATHOLOGY

## 2017-10-09 PROCEDURE — 82962 GLUCOSE BLOOD TEST: CPT | Performed by: ORTHOPAEDIC SURGERY

## 2017-10-09 PROCEDURE — 27800903 OPTIME MED/SURG SUP & DEVICES OTHER IMPLANTS: Performed by: ORTHOPAEDIC SURGERY

## 2017-10-09 PROCEDURE — 88311 DECALCIFY TISSUE: CPT | Mod: 26,,, | Performed by: PATHOLOGY

## 2017-10-09 PROCEDURE — 63600175 PHARM REV CODE 636 W HCPCS: Performed by: ANESTHESIOLOGY

## 2017-10-09 PROCEDURE — 0SRB02Z REPLACEMENT OF LEFT HIP JOINT WITH METAL ON POLYETHYLENE SYNTHETIC SUBSTITUTE, OPEN APPROACH: ICD-10-PCS | Performed by: ORTHOPAEDIC SURGERY

## 2017-10-09 PROCEDURE — 85018 HEMOGLOBIN: CPT

## 2017-10-09 PROCEDURE — 86900 BLOOD TYPING SEROLOGIC ABO: CPT

## 2017-10-09 PROCEDURE — 11000001 HC ACUTE MED/SURG PRIVATE ROOM

## 2017-10-09 PROCEDURE — 71000039 HC RECOVERY, EACH ADD'L HOUR: Performed by: ORTHOPAEDIC SURGERY

## 2017-10-09 DEVICE — PLUG ELIM HOLE POSITIVE STOP: Type: IMPLANTABLE DEVICE | Site: HIP | Status: FUNCTIONAL

## 2017-10-09 DEVICE — STEM FEM 12/14 TAPER ARTIC SIZ: Type: IMPLANTABLE DEVICE | Site: HIP | Status: FUNCTIONAL

## 2017-10-09 DEVICE — LINE ACET PINN 36X54 ALTRX: Type: IMPLANTABLE DEVICE | Site: HIP | Status: FUNCTIONAL

## 2017-10-09 DEVICE — IMPLANTABLE DEVICE: Type: IMPLANTABLE DEVICE | Site: HIP | Status: FUNCTIONAL

## 2017-10-09 RX ORDER — SUCCINYLCHOLINE CHLORIDE 20 MG/ML
INJECTION INTRAMUSCULAR; INTRAVENOUS
Status: DISCONTINUED | OUTPATIENT
Start: 2017-10-09 | End: 2017-10-09

## 2017-10-09 RX ORDER — SODIUM CHLORIDE 0.9 % (FLUSH) 0.9 %
3 SYRINGE (ML) INJECTION
Status: DISCONTINUED | OUTPATIENT
Start: 2017-10-09 | End: 2017-10-09 | Stop reason: HOSPADM

## 2017-10-09 RX ORDER — MUPIROCIN 20 MG/G
1 OINTMENT TOPICAL
Status: COMPLETED | OUTPATIENT
Start: 2017-10-09 | End: 2017-10-09

## 2017-10-09 RX ORDER — CEFAZOLIN SODIUM 2 G/50ML
2 SOLUTION INTRAVENOUS
Status: COMPLETED | OUTPATIENT
Start: 2017-10-09 | End: 2017-10-10

## 2017-10-09 RX ORDER — SODIUM CHLORIDE 0.9 % (FLUSH) 0.9 %
3 SYRINGE (ML) INJECTION EVERY 8 HOURS
Status: DISCONTINUED | OUTPATIENT
Start: 2017-10-09 | End: 2017-10-09 | Stop reason: HOSPADM

## 2017-10-09 RX ORDER — LIDOCAINE HYDROCHLORIDE 10 MG/ML
1 INJECTION, SOLUTION EPIDURAL; INFILTRATION; INTRACAUDAL; PERINEURAL ONCE
Status: DISCONTINUED | OUTPATIENT
Start: 2017-10-09 | End: 2017-10-09 | Stop reason: HOSPADM

## 2017-10-09 RX ORDER — BACITRACIN 50000 [IU]/1
INJECTION, POWDER, FOR SOLUTION INTRAMUSCULAR
Status: DISCONTINUED | OUTPATIENT
Start: 2017-10-09 | End: 2017-10-09 | Stop reason: HOSPADM

## 2017-10-09 RX ORDER — GLYCOPYRROLATE 0.2 MG/ML
INJECTION INTRAMUSCULAR; INTRAVENOUS
Status: DISCONTINUED | OUTPATIENT
Start: 2017-10-09 | End: 2017-10-09

## 2017-10-09 RX ORDER — SODIUM CHLORIDE, SODIUM LACTATE, POTASSIUM CHLORIDE, CALCIUM CHLORIDE 600; 310; 30; 20 MG/100ML; MG/100ML; MG/100ML; MG/100ML
INJECTION, SOLUTION INTRAVENOUS CONTINUOUS
Status: DISCONTINUED | OUTPATIENT
Start: 2017-10-09 | End: 2017-10-09

## 2017-10-09 RX ORDER — DOCUSATE SODIUM 100 MG/1
100 CAPSULE, LIQUID FILLED ORAL DAILY
Status: DISCONTINUED | OUTPATIENT
Start: 2017-10-10 | End: 2017-10-12 | Stop reason: HOSPADM

## 2017-10-09 RX ORDER — PREGABALIN 75 MG/1
75 CAPSULE ORAL ONCE
Status: COMPLETED | OUTPATIENT
Start: 2017-10-09 | End: 2017-10-09

## 2017-10-09 RX ORDER — RAMELTEON 8 MG/1
8 TABLET ORAL NIGHTLY PRN
Status: DISCONTINUED | OUTPATIENT
Start: 2017-10-09 | End: 2017-10-12 | Stop reason: HOSPADM

## 2017-10-09 RX ORDER — HYDROMORPHONE HYDROCHLORIDE 2 MG/ML
0.2 INJECTION, SOLUTION INTRAMUSCULAR; INTRAVENOUS; SUBCUTANEOUS EVERY 5 MIN PRN
Status: DISCONTINUED | OUTPATIENT
Start: 2017-10-09 | End: 2017-10-09 | Stop reason: HOSPADM

## 2017-10-09 RX ORDER — PHENYLEPHRINE HYDROCHLORIDE 10 MG/ML
INJECTION INTRAVENOUS
Status: DISCONTINUED | OUTPATIENT
Start: 2017-10-09 | End: 2017-10-09

## 2017-10-09 RX ORDER — PROPOFOL 10 MG/ML
VIAL (ML) INTRAVENOUS
Status: DISCONTINUED | OUTPATIENT
Start: 2017-10-09 | End: 2017-10-09

## 2017-10-09 RX ORDER — FENTANYL CITRATE 50 UG/ML
INJECTION, SOLUTION INTRAMUSCULAR; INTRAVENOUS
Status: DISCONTINUED | OUTPATIENT
Start: 2017-10-09 | End: 2017-10-09

## 2017-10-09 RX ORDER — CEFAZOLIN SODIUM 1 G/3ML
INJECTION, POWDER, FOR SOLUTION INTRAMUSCULAR; INTRAVENOUS
Status: DISCONTINUED | OUTPATIENT
Start: 2017-10-09 | End: 2017-10-09 | Stop reason: HOSPADM

## 2017-10-09 RX ORDER — INSULIN ASPART 100 [IU]/ML
1-10 INJECTION, SOLUTION INTRAVENOUS; SUBCUTANEOUS
Status: DISCONTINUED | OUTPATIENT
Start: 2017-10-09 | End: 2017-10-12 | Stop reason: HOSPADM

## 2017-10-09 RX ORDER — GLIPIZIDE 5 MG/1
5 TABLET ORAL 2 TIMES DAILY WITH MEALS
Status: DISCONTINUED | OUTPATIENT
Start: 2017-10-09 | End: 2017-10-09 | Stop reason: CLARIF

## 2017-10-09 RX ORDER — METOPROLOL SUCCINATE 50 MG/1
50 TABLET, EXTENDED RELEASE ORAL DAILY
Status: DISCONTINUED | OUTPATIENT
Start: 2017-10-10 | End: 2017-10-12 | Stop reason: HOSPADM

## 2017-10-09 RX ORDER — SODIUM CHLORIDE 9 MG/ML
INJECTION, SOLUTION INTRAVENOUS CONTINUOUS
Status: DISCONTINUED | OUTPATIENT
Start: 2017-10-09 | End: 2017-10-12 | Stop reason: HOSPADM

## 2017-10-09 RX ORDER — GLUCAGON 1 MG
1 KIT INJECTION
Status: DISCONTINUED | OUTPATIENT
Start: 2017-10-09 | End: 2017-10-12 | Stop reason: HOSPADM

## 2017-10-09 RX ORDER — CLONAZEPAM 0.5 MG/1
0.5 TABLET ORAL NIGHTLY
Status: DISCONTINUED | OUTPATIENT
Start: 2017-10-09 | End: 2017-10-12 | Stop reason: HOSPADM

## 2017-10-09 RX ORDER — PREGABALIN 25 MG/1
75 CAPSULE ORAL NIGHTLY
Status: DISCONTINUED | OUTPATIENT
Start: 2017-10-09 | End: 2017-10-12 | Stop reason: HOSPADM

## 2017-10-09 RX ORDER — MEPERIDINE HYDROCHLORIDE 50 MG/ML
12.5 INJECTION INTRAMUSCULAR; INTRAVENOUS; SUBCUTANEOUS ONCE AS NEEDED
Status: DISCONTINUED | OUTPATIENT
Start: 2017-10-09 | End: 2017-10-09 | Stop reason: HOSPADM

## 2017-10-09 RX ORDER — LIDOCAINE HCL/PF 100 MG/5ML
SYRINGE (ML) INTRAVENOUS
Status: DISCONTINUED | OUTPATIENT
Start: 2017-10-09 | End: 2017-10-09

## 2017-10-09 RX ORDER — ONDANSETRON 2 MG/ML
INJECTION INTRAMUSCULAR; INTRAVENOUS
Status: DISCONTINUED | OUTPATIENT
Start: 2017-10-09 | End: 2017-10-09

## 2017-10-09 RX ORDER — MORPHINE SULFATE 2 MG/ML
2 INJECTION, SOLUTION INTRAMUSCULAR; INTRAVENOUS EVERY 4 HOURS PRN
Status: DISCONTINUED | OUTPATIENT
Start: 2017-10-09 | End: 2017-10-12 | Stop reason: HOSPADM

## 2017-10-09 RX ORDER — ONDANSETRON 2 MG/ML
4 INJECTION INTRAMUSCULAR; INTRAVENOUS EVERY 12 HOURS PRN
Status: DISCONTINUED | OUTPATIENT
Start: 2017-10-09 | End: 2017-10-12 | Stop reason: HOSPADM

## 2017-10-09 RX ORDER — ROCURONIUM BROMIDE 10 MG/ML
INJECTION, SOLUTION INTRAVENOUS
Status: DISCONTINUED | OUTPATIENT
Start: 2017-10-09 | End: 2017-10-09

## 2017-10-09 RX ORDER — DEXAMETHASONE SODIUM PHOSPHATE 4 MG/ML
INJECTION, SOLUTION INTRA-ARTICULAR; INTRALESIONAL; INTRAMUSCULAR; INTRAVENOUS; SOFT TISSUE
Status: DISCONTINUED | OUTPATIENT
Start: 2017-10-09 | End: 2017-10-09

## 2017-10-09 RX ORDER — ACETAMINOPHEN 10 MG/ML
1000 INJECTION, SOLUTION INTRAVENOUS ONCE
Status: DISCONTINUED | OUTPATIENT
Start: 2017-10-09 | End: 2017-10-09 | Stop reason: HOSPADM

## 2017-10-09 RX ORDER — OXYCODONE AND ACETAMINOPHEN 10; 325 MG/1; MG/1
1 TABLET ORAL EVERY 4 HOURS PRN
Qty: 60 TABLET | Refills: 0 | Status: ON HOLD | OUTPATIENT
Start: 2017-10-09 | End: 2017-10-27

## 2017-10-09 RX ORDER — ACETAMINOPHEN 10 MG/ML
1000 INJECTION, SOLUTION INTRAVENOUS EVERY 6 HOURS
Status: COMPLETED | OUTPATIENT
Start: 2017-10-09 | End: 2017-10-10

## 2017-10-09 RX ORDER — NEOMYCIN AND POLYMYXIN B SULFATES 40; 200000 MG/ML; [USP'U]/ML
SOLUTION IRRIGATION
Status: DISCONTINUED | OUTPATIENT
Start: 2017-10-09 | End: 2017-10-09 | Stop reason: HOSPADM

## 2017-10-09 RX ORDER — NEOSTIGMINE METHYLSULFATE 1 MG/ML
INJECTION, SOLUTION INTRAVENOUS
Status: DISCONTINUED | OUTPATIENT
Start: 2017-10-09 | End: 2017-10-09

## 2017-10-09 RX ORDER — POLYETHYLENE GLYCOL 3350 17 G/17G
17 POWDER, FOR SOLUTION ORAL DAILY
Status: DISCONTINUED | OUTPATIENT
Start: 2017-10-10 | End: 2017-10-12 | Stop reason: HOSPADM

## 2017-10-09 RX ORDER — IBUPROFEN 200 MG
16 TABLET ORAL
Status: DISCONTINUED | OUTPATIENT
Start: 2017-10-09 | End: 2017-10-12 | Stop reason: HOSPADM

## 2017-10-09 RX ORDER — IBUPROFEN 200 MG
24 TABLET ORAL
Status: DISCONTINUED | OUTPATIENT
Start: 2017-10-09 | End: 2017-10-12 | Stop reason: HOSPADM

## 2017-10-09 RX ORDER — PRAMIPEXOLE DIHYDROCHLORIDE 0.25 MG/1
0.25 TABLET ORAL NIGHTLY
Status: DISCONTINUED | OUTPATIENT
Start: 2017-10-09 | End: 2017-10-12 | Stop reason: HOSPADM

## 2017-10-09 RX ORDER — CEFAZOLIN SODIUM 2 G/50ML
2 SOLUTION INTRAVENOUS
Status: DISCONTINUED | OUTPATIENT
Start: 2017-10-09 | End: 2017-10-09 | Stop reason: HOSPADM

## 2017-10-09 RX ORDER — OXYCODONE HYDROCHLORIDE 5 MG/1
10 TABLET ORAL
Status: DISCONTINUED | OUTPATIENT
Start: 2017-10-09 | End: 2017-10-12 | Stop reason: HOSPADM

## 2017-10-09 RX ADMIN — SODIUM CHLORIDE, SODIUM LACTATE, POTASSIUM CHLORIDE, AND CALCIUM CHLORIDE: 600; 310; 30; 20 INJECTION, SOLUTION INTRAVENOUS at 11:10

## 2017-10-09 RX ADMIN — ROCURONIUM BROMIDE 20 MG: 10 INJECTION, SOLUTION INTRAVENOUS at 11:10

## 2017-10-09 RX ADMIN — SODIUM CHLORIDE, SODIUM LACTATE, POTASSIUM CHLORIDE, AND CALCIUM CHLORIDE: 600; 310; 30; 20 INJECTION, SOLUTION INTRAVENOUS at 02:10

## 2017-10-09 RX ADMIN — HYDROMORPHONE HYDROCHLORIDE 0.2 MG: 2 INJECTION, SOLUTION INTRAMUSCULAR; INTRAVENOUS; SUBCUTANEOUS at 03:10

## 2017-10-09 RX ADMIN — SUCCINYLCHOLINE CHLORIDE 140 MG: 20 INJECTION, SOLUTION INTRAMUSCULAR; INTRAVENOUS at 10:10

## 2017-10-09 RX ADMIN — CEFAZOLIN SODIUM 2 G: 2 SOLUTION INTRAVENOUS at 05:10

## 2017-10-09 RX ADMIN — APIXABAN 2.5 MG: 2.5 TABLET, FILM COATED ORAL at 08:10

## 2017-10-09 RX ADMIN — BUPIVACAINE 266 MG: 13.3 INJECTION, SUSPENSION, LIPOSOMAL INFILTRATION at 11:10

## 2017-10-09 RX ADMIN — ROCURONIUM BROMIDE 5 MG: 10 INJECTION, SOLUTION INTRAVENOUS at 10:10

## 2017-10-09 RX ADMIN — HYDROMORPHONE HYDROCHLORIDE 0.2 MG: 2 INJECTION, SOLUTION INTRAMUSCULAR; INTRAVENOUS; SUBCUTANEOUS at 02:10

## 2017-10-09 RX ADMIN — PHENYLEPHRINE HYDROCHLORIDE 200 MCG: 10 INJECTION INTRAVENOUS at 01:10

## 2017-10-09 RX ADMIN — INSULIN ASPART 2 UNITS: 100 INJECTION, SOLUTION INTRAVENOUS; SUBCUTANEOUS at 10:10

## 2017-10-09 RX ADMIN — ROCURONIUM BROMIDE 30 MG: 10 INJECTION, SOLUTION INTRAVENOUS at 11:10

## 2017-10-09 RX ADMIN — ACETAMINOPHEN 1000 MG: 10 INJECTION, SOLUTION INTRAVENOUS at 10:10

## 2017-10-09 RX ADMIN — ROCURONIUM BROMIDE 30 MG: 10 INJECTION, SOLUTION INTRAVENOUS at 01:10

## 2017-10-09 RX ADMIN — FENTANYL CITRATE 50 MCG: 50 INJECTION, SOLUTION INTRAMUSCULAR; INTRAVENOUS at 12:10

## 2017-10-09 RX ADMIN — CEFAZOLIN SODIUM 2 G: 2 SOLUTION INTRAVENOUS at 10:10

## 2017-10-09 RX ADMIN — ACETAMINOPHEN 1000 MG: 10 INJECTION, SOLUTION INTRAVENOUS at 06:10

## 2017-10-09 RX ADMIN — NEOSTIGMINE METHYLSULFATE 5 MG: 1 INJECTION INTRAVENOUS at 02:10

## 2017-10-09 RX ADMIN — LIDOCAINE HYDROCHLORIDE 80 MG: 20 INJECTION, SOLUTION INTRAVENOUS at 10:10

## 2017-10-09 RX ADMIN — PRAMIPEXOLE DIHYDROCHLORIDE 0.25 MG: 0.25 TABLET ORAL at 08:10

## 2017-10-09 RX ADMIN — CLONAZEPAM 0.5 MG: 0.5 TABLET ORAL at 08:10

## 2017-10-09 RX ADMIN — ONDANSETRON 4 MG: 2 INJECTION, SOLUTION INTRAMUSCULAR; INTRAVENOUS at 01:10

## 2017-10-09 RX ADMIN — ROCURONIUM BROMIDE 45 MG: 10 INJECTION, SOLUTION INTRAVENOUS at 10:10

## 2017-10-09 RX ADMIN — MUPIROCIN 1 G: 20 OINTMENT TOPICAL at 10:10

## 2017-10-09 RX ADMIN — SODIUM CHLORIDE: 0.9 INJECTION, SOLUTION INTRAVENOUS at 05:10

## 2017-10-09 RX ADMIN — FENTANYL CITRATE 50 MCG: 50 INJECTION, SOLUTION INTRAMUSCULAR; INTRAVENOUS at 11:10

## 2017-10-09 RX ADMIN — PREGABALIN 75 MG: 25 CAPSULE ORAL at 08:10

## 2017-10-09 RX ADMIN — PROPOFOL 150 MG: 10 INJECTION, EMULSION INTRAVENOUS at 10:10

## 2017-10-09 RX ADMIN — PREGABALIN 75 MG: 75 CAPSULE ORAL at 10:10

## 2017-10-09 RX ADMIN — SODIUM CHLORIDE, SODIUM LACTATE, POTASSIUM CHLORIDE, AND CALCIUM CHLORIDE: 600; 310; 30; 20 INJECTION, SOLUTION INTRAVENOUS at 10:10

## 2017-10-09 RX ADMIN — ROBINUL 0.8 MG: 0.2 INJECTION INTRAMUSCULAR; INTRAVENOUS at 02:10

## 2017-10-09 RX ADMIN — FENTANYL CITRATE 100 MCG: 50 INJECTION, SOLUTION INTRAMUSCULAR; INTRAVENOUS at 10:10

## 2017-10-09 RX ADMIN — DEXAMETHASONE SODIUM PHOSPHATE 4 MG: 4 INJECTION, SOLUTION INTRA-ARTICULAR; INTRALESIONAL; INTRAMUSCULAR; INTRAVENOUS; SOFT TISSUE at 01:10

## 2017-10-09 NOTE — ASSESSMENT & PLAN NOTE
-status post left hip arthroplasty performed by Dr. Alcocer  -monitor serial H/H  -Eliquis to start in AM for DVT prophylaxis  -PT/OT evaluation  -Social work for discharge planning

## 2017-10-09 NOTE — OP NOTE
DATE OF PROCEDURE: 10/09/2017    PREOPERATIVE DIAGNOSES:Left hip osteoarthritis .    POSTOPERATIVE DIAGNOSES:Left hip osteoarthritis, left hip synovitis, left hip exostosis    OPERATIVE PROCEDURE: Left hip anterior total hip replacement , capsulotomy, synovectomy and exostosis excision    SURGEON: Albaro Alcocer Sr., M.D.    ASSISTANT:QUETA Mathew. His assistance was critical and necesssary with positioning of the extremity throughout the surgical procedure.The physician assistant allowed me to access areas of the left hip that could not be possible without the assistance of a trained orthopedic physician assistant.  His assistance was critical to allowing me to provide the highest level of care to the patient.    COMPLICATIONS: None    SPECIMEN: Left femoral head  .  ANESTHESIA: General anesthesia with intubation.    INDICATION FOR SURGERY: The patient failed conservative treatment with weight    loss, physical therapy exercises, anti-inflammatory narcotic medication and    walking aid. The patient had a significant fall risk and the pain in the right/left  hip was progressing and interfering with her activities of daily living. The    patient surgical procedure was 100% greater degree of difficulty than a normal    total hip due to her hip contracture. The procedure required the    constant assistance of 3 individuals assisting in surgery at all times    throughout the procedure. The patient's estimated blood loss was 850 mL.    OPERATIVE PROCEDURE IN DETAIL: The patient was brought to the Operating Room    after appropriate consent, placed under general anesthesia with intubation. The  patient was placed in a supine position. The Left hip prepped  with alcohol, chlorhexidine and sterilely draped. The time-out was performed.    The patient did receive preoperative IV antibiotics. An incision made  3 cm lateral to   And 1 cm distal to the anterior superior iliac spine.  The incision extended distally in line  with the anterior portion of the tensor fascia judah. Dissection was    carried through subcutaneous tissue down to tensor fascia judah. Fascia was    Incised overlying the tensor fascia judah. The interval between the tensor fascia judah and the    Rectus femoris was developed. The cautery was used for hemostasis.    The capsule was identified and the Cobra was placed over the anterior neck outside of the capsule as well as the posterior neck of the outer capsule of the hip. The capsulotomy    performed. The patient noted to have osteophyte at the periphery of the    acetabulum as well as greater trochanter. The rongeur used to remove and excise  the exostosis. The patient noted to have a synovitis of the hip joint.The femoral neck cut made after  the femoral neck cut guide placed. The femoral head was removed.    The alignment of the femoral head neck lesser and greater trochanter were isolated under    Fluoroscopy fluoroscopy. The relationship of the lesser trochanter in relation to the issue tuberosity with good obturator views was noted. The hips were kept in neutral rotation on the [employer] table.  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 The patient has significant arthritis. The reaming performed up to a size 53 . The trial size  54  mm cup was placed noted to fit quite well. Drill holes made of the medial    aspect of the acetabulum and the bleeding bone exposed. The bone graft placed    medially. The size 54 mm cup placed retaining the anatomical angle of inclination    as well as the version. The trial polyethylene locked in place. The reaming    and rasping performed on the femoral side using a size 3 reamer and rasp. The    Size 3 rasp femoral component with a high offset left in place. The +12 neck,  36 mm head placed  on the femoral side. The hip  reduced, brought through range of motion, noted totrack quite well. All trial components were removed. Pulse lavaged thoroughlyperformed. The permanent polyethylene locked in place. The femoral stem size  3 placed with the appropriate femoral version, a size 36 mm head with a +12 neck was placed.  The hip reduced, brought through range of motion, noted  to track quite well. No significant pistoning noted. Intraoperative fluoroscopy showed good alignment of the  Prosthesis as well as good leg length symmetry.  The pulse lavage performed an intraarticular drain placed.  The subcutaneous tissue opposed with #1 Vicryl sutures, a    subcuticular suture placed. The overlying skin reinforced with Dermabond.     Sterile gauze applied. The sponge tape    applied. A   deep drain was in place prior to closing of the    incision.Intraoperative x-ray    verified the placement of the hardware, which was noted to be satisfactory. The  patient tolerated the procedure well and left the Operating Room in good    Condition.    Albaro Alcocer M.D.

## 2017-10-09 NOTE — PLAN OF CARE
Pt resting on bed s/p lt YAHIR-DAA performed under general anesthesia. Respirations even and unlabored on room air and tolerating well with O2 sats of 100%. Lt hip dsg remains c/d/i. Neurovascular checks remain intact. Caro intact draining clear yellow urine. Will cont to monitor. See flow sheet for detailed assessment.

## 2017-10-09 NOTE — TRANSFER OF CARE
"Anesthesia Transfer of Care Note    Patient: Srinath Mcknight    Procedure(s) Performed: Procedure(s) (LRB):  ARTHROPLASTY-HIP (ANTERIOR APPROACH) (Left)    Patient location: PACU    Anesthesia Type: general    Transport from OR: Transported from OR on room air with adequate spontaneous ventilation    Post pain: adequate analgesia    Post assessment: no apparent anesthetic complications and tolerated procedure well    Post vital signs: stable    Level of consciousness: awake    Nausea/Vomiting: no nausea/vomiting    Complications: none    Transfer of care protocol was followed      Last vitals:   Visit Vitals  BP (!) 148/67 (BP Location: Right arm, Patient Position: Sitting)   Pulse 64   Temp 36.5 °C (97.7 °F) (Temporal)   Resp 18   Ht 6' 3" (1.905 m)   Wt 98.6 kg (217 lb 6 oz)   SpO2 97%   BMI 27.17 kg/m²     "

## 2017-10-09 NOTE — ANESTHESIA PREPROCEDURE EVALUATION
10/09/2017  Srinath Mcknight is a 76 y.o., male.    Anesthesia Evaluation    I have reviewed the Patient Summary Reports.    I have reviewed the Nursing Notes.   I have reviewed the Medications.     Review of Systems  Anesthesia Hx:  No problems with previous Anesthesia  Denies Family Hx of Anesthesia complications.   Denies Personal Hx of Anesthesia complications.   Social:  Social Alcohol Use, Non-Smoker    Hematology/Oncology:     Oncology Normal    -- Anemia:   Cardiovascular:   Hypertension Denies MI. CAD    Denies CABG/stent.  Angina hyperlipidemia ECG has been reviewed. Nuclear Quantitative Functional Analysis:   LVEF: 55 %    Impression: NORMAL MYOCARDIAL PERFUSION  1. The perfusion scan is free of evidence for myocardial ischemia or injury.   2. Resting wall motion is physiologic.   3. Resting LV function is normal.   4. The ventricular volumes are normal at rest and stress.   5. The extracardiac distribution of radioactivity is normal    ekg 8/2017:  Normal sinus rhythm 70  Nonspecific T wave abnormality  Abnormal ECG  When compared with ECG of 29-MAR-2017 13:07,  No significant change was found   Pulmonary:   Denies COPD.  Denies Asthma. Sleep Apnea    Renal/:  Renal/ Normal     Hepatic/GI:   Denies GERD. Denies Liver Disease.  Denies Hepatitis.    Musculoskeletal:   Arthritis  Spinal cord disease   Neurological:   Denies CVA. Neuromuscular Disease,  Denies Seizures.   Peripheral Neuropathy    Endocrine:   Diabetes, type 2 Denies Hypothyroidism. Denies Hyperthyroidism.        Physical Exam  General:  Well nourished    Airway/Jaw/Neck:  Airway Findings: Mouth Opening: Normal Tongue: Normal  General Airway Assessment: Adult  Mallampati: II      Dental:  Dental Findings: In tact   Chest/Lungs:  Chest/Lungs Findings: Clear to auscultation, Normal Respiratory Rate     Heart/Vascular:  Heart  Findings: Rate: Normal  Rhythm: Regular Rhythm  Sounds: Normal             Anesthesia Plan  Type of Anesthesia, risks & benefits discussed:  Anesthesia Type:  general  Patient's Preference:   Intra-op Monitoring Plan: standard ASA monitors  Intra-op Monitoring Plan Comments:   Post Op Pain Control Plan: multimodal analgesia  Post Op Pain Control Plan Comments:   Induction:   IV  Beta Blocker:  Patient is on a Beta-Blocker and has received one dose within the past 24 hours (No further documentation required).       Informed Consent: Patient understands risks and agrees with Anesthesia plan.  Questions answered. Anesthesia consent signed with patient.  ASA Score: 2     Day of Surgery Review of History & Physical: I have interviewed and examined the patient. I have reviewed the patient's H&P dated:  There are no significant changes.  H&P update referred to the surgeon.         Ready For Surgery From Anesthesia Perspective.

## 2017-10-09 NOTE — HPI
Srinath Mcknight is a 76 year old male with history of HTN, HLD, CAD, and DM who underwent left hip arthroplasty today performed by Dr. Alcocer.  ml. Denies discomfort at this time. Hospital medicine has been consulted for medical management.

## 2017-10-09 NOTE — H&P
CC:This is a 76-year-old male that complains of left knee and hip pain.     HPI:the patient has failed conservative treatment of the left hip arthritis.  The pain ranges from a 2 out of 10 to a 10 out of 10.  The pain interferes with his activities of daily living and is gradually worsening.     PMH:         Past Medical History:   Diagnosis Date    Anemia due to unknown mechanism 11/10/2015    Angina pectoris 2014     not since    Arthritis      Back pain      Coronary artery disease      Diabetes mellitus 20 years      am 09/29/2014    Diabetes mellitus type II 1994      am 12/22/2015    DM (diabetes mellitus) 1994     BS 78 am 01/10/2017    Hyperlipemia      Hypertension      CHARLES (obstructive sleep apnea)      Spinal cord disease      Trouble in sleeping      Type 2 diabetes mellitus with diabetic polyneuropathy, without long-term current use of insulin           PSH:          Past Surgical History:   Procedure Laterality Date    HERNIA REPAIR Bilateral 04/18/2017    LAMINECTOMY   07/22/2016    ROTATOR CUFF REPAIR Left 2006    SHOULDER ARTHROSCOPY W/ ROTATOR CUFF REPAIR Right 2009         Family Hx:           Family History   Problem Relation Age of Onset    Hypertension Mother      Heart disease Mother      Diabetes Mother      Hypertension Father      Heart disease Father      Diabetes Father      Stroke Father      Diabetes Sister      Cancer Brother 50       pancreas    Drug abuse Brother      Prostate cancer Neg Hx      Macular degeneration Neg Hx      Retinal detachment Neg Hx      Strabismus Neg Hx      Glaucoma Neg Hx      Blindness Neg Hx      Amblyopia Neg Hx      Kidney disease Neg Hx      Mental illness Neg Hx      Mental retardation Neg Hx      COPD Neg Hx      Asthma Neg Hx           Allergy:          Review of patient's allergies indicates:   Allergen Reactions    Sulfa (sulfonamide antibiotics)         Can't recall from 1995         Medication:     Current Outpatient Prescriptions:     acetaminophen (TYLENOL) 650 MG TbSR, Take 1,300 mg by mouth every 8 (eight) hours., Disp: , Rfl:     aspirin (ECOTRIN) 81 MG EC tablet, Take 81 mg by mouth once daily., Disp: , Rfl:     blood sugar diagnostic (ACCU-CHEK FRANKO PLUS TEST STRP) Strp, USE  1  STRIP ONE TIME DAILY, Disp: 100 strip, Rfl: 4    celecoxib (CELEBREX) 200 MG capsule, Take 1 capsule (200 mg total) by mouth once daily., Disp: 90 capsule, Rfl: 3    clonazePAM (KLONOPIN) 0.25 MG TbDL, Take 2 tablets (0.5 mg total) by mouth nightly., Disp: 90 tablet, Rfl: 0    docusate sodium (COLACE) 100 MG capsule, Take 1 capsule (100 mg total) by mouth 2 (two) times daily as needed., Disp: 60 capsule, Rfl: 0    gabapentin (NEURONTIN) 100 MG capsule, TAKE 1 CAPSULE BY MOUTH EVERY NIGHT AT BEDTIME FOR 1 WEEK, THEN INCREASE TO 2 CAPSULES EVERY NIGHT AT BEDTIME THEREAFTER, Disp: 180 capsule, Rfl: 1    glipiZIDE (GLUCOTROL) 5 MG tablet, TAKE 2 TABLETS (10 MG) TWO TIMES DAILY BEFORE MEALS, Disp: 360 tablet, Rfl: 4    lancets (ACCU-CHEK MULTICLIX LANCET) Misc, TEST ONE TIME DAILY, Disp: 100 each, Rfl: 3    losartan-hydrochlorothiazide 50-12.5 mg (HYZAAR) 50-12.5 mg per tablet, TAKE 1 TABLET ONE TIME DAILY, Disp: 90 tablet, Rfl: 4    melatonin 5 mg Cap, , Disp: , Rfl:     metformin (GLUCOPHAGE) 1000 MG tablet, TAKE 1 TABLET TWICE DAILY WITH MEALS, Disp: 180 tablet, Rfl: 4    metoprolol succinate (TOPROL-XL) 50 MG 24 hr tablet, TAKE 1 TABLET EVERY DAY, Disp: 90 tablet, Rfl: 3    oxycodone-acetaminophen (PERCOCET)  mg per tablet, Take 1 tablet by mouth every 4 (four) hours as needed., Disp: 90 tablet, Rfl: 0    oxycodone-acetaminophen (PERCOCET) 5-325 mg per tablet, Take 1 tablet by mouth every 4 (four) hours as needed for Pain., Disp: 65 tablet, Rfl: 0    pramipexole (MIRAPEX) 0.25 MG tablet, Take 1 tablet (0.25 mg total) by mouth every evening., Disp: 30 tablet, Rfl: 5    pravastatin (PRAVACHOL) 40 MG  tablet, TAKE 1 TABLET EVERY DAY, Disp: 90 tablet, Rfl: 4    senna (SENNA CONCENTRATE) 8.6 mg tablet, Take 1 tablet by mouth once daily., Disp: 60 tablet, Rfl: 0    sildenafil (VIAGRA) 100 MG tablet, Take 1 tablet (100 mg total) by mouth as needed. Take on an empty stomach, Disp: 6 tablet, Rfl: 11     Social History:    Social History   Social History            Social History    Marital status:        Spouse name: N/A    Number of children: 0    Years of education: N/A            Occupational History    retired         teacher             Social History Main Topics    Smoking status: Never Smoker    Smokeless tobacco: Never Used    Alcohol use 0.6 oz/week       1 Glasses of wine per week         Comment: rarely     Drug use: No    Sexual activity: Yes       Partners: Female       Birth control/ protection: Condom           Other Topics Concern    Not on file          Social History Narrative     Single lives alone. No children. Retired but some part time work - 4 hours a day. 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 Caffeine intake - sugar free cola, Rare coffee intake. Still drives. Does have a Living Will . Has a nephew Jt Gayle, lives in [place]. Has a friend Karina Rossi, locally who could help him.             Vitals:   /60   Pulse 60   Wt 100 kg (220 lb 7.4 oz)   BMI 27.56 kg/m²       ROS:  GENERAL: No fever, chills, fatigability or weight loss.  SKIN: No rashes, itching or changes in color or texture of skin.  HEAD: No headaches or recent head trauma.  EYES: Visual acuity fine. No photophobia, ocular pain or diplopia.  EARS: Denies ear pain, discharge or vertigo.  NOSE: No loss of smell, no epistaxis or postnasal drip.  MOUTH & THROAT: No hoarseness or change in voice. No excessive gum bleeding.  NODES: Denies swollen glands.  CHEST: Denies DAMON, cyanosis, wheezing, cough and sputum production.  CARDIOVASCULAR: Denies chest pain, PND, orthopnea or reduced exercise  tolerance.  ABDOMEN: Appetite fine. No weight loss. Denies diarrhea, abdominal pain, hematemesis or blood in stool.  URINARY: No flank pain, dysuria or hematuria.  PERIPHERAL VASCULAR: No claudication or cyanosis.  NEUROLOGIC: No history of seizures, paralysis, alteration of gait or coordination.  MUSCULOSKELETAL: See HPI     PE:  APPEARANCE: Well nourished, well developed, in no acute distress.   HEAD: Normocephalic, atraumatic.  EYES: PERRL. EOMI.   EARS: TM's intact. Light reflex normal. No retraction or perforation.   NOSE: Mucosa pink. Airway clear.  MOUTH & THROAT: No tonsillar enlargement. No pharyngeal erythema or exudate. No stridor.  NECK: Supple.   NODES: No cervical, axillary or inguinal lymph node enlargement.  CHEST: Lungs clear to auscultation.  CARDIOVASCULAR: Normal S1, S2. No rubs, murmurs or gallops.  ABDOMEN: Bowel sounds normal. Not distended. Soft. No tenderness or masses.  NEUROLOGIC: Cranial Nerves: II-XII grossly intact, also see MUSCULOSKELETAL  MUSCULOSKELETAL:              Hip                            ( Left)  Degrees     Normal  Flexion                    decreased                                0-135  Extension              decreased                                 0-30  Abduction               decreased                                  0-50  Adduction              decreased                                   0-30  Medial Rotation     decreased                                    0-40                         Lateral Rotation    decreased                                    0-60  ositiveCrepitus   Greater Trochanteric tenderness  +1/4 DP and PT artery pulses         Sensation intact all dermatomes  +4-5/5 motor strength throughout  +2/4 DTR-PT,AT  Negative long tract signs- neg Babinski, neg Clonus         Assessment:            Diagnosis:              1.left hip arthritis                2. Left knee arthritis                     Diagnostic  Studies  MRI-No  X-Ray-No  EMG/NCV-No  Arthrogram-No  Bone Scan-No  CT Scan-No  Doppler-No  ESR-No  CRP-No  CBC with Diff-No   Rheumatoid/Arthritis Panel-No        Plan:                                                  1. PT-yes                                                 2.OT-no                                          3.NSAID-yes                                        4. Narcotics-yes                                     5. Wound care-N/A                                 6. Rest-yes                                           7. Surgery-yes, I recommend the patient undergo a left total hip replacement.                                         8. BA Hose-no                                    9. Anticoagulation therapy-no               10. Elevation-no                                     11. Crutches-no                                    12. Walker-no             13. Cane yes                        14. Referral-no                                     15.Injection-no                            16. Splint   /    Cast   /   Cast Shoe-Yes              17. RICE-none            18. Follow up- 3 weeks

## 2017-10-09 NOTE — INTERVAL H&P NOTE
The patient has been examined and the H&P has been reviewed:    I concur with the findings and no changes have occurred since H&P was written.    Anesthesia/Surgery risks, benefits and alternative options discussed and understood by patient/family.          Active Hospital Problems    Diagnosis  POA    Arthritis of left hip [M16.12]  Yes      Resolved Hospital Problems    Diagnosis Date Resolved POA   No resolved problems to display.

## 2017-10-09 NOTE — SUBJECTIVE & OBJECTIVE
Past Medical History:   Diagnosis Date    Anemia due to unknown mechanism 11/10/2015    Angina pectoris 2014    not since    Arthritis     Back pain     Coronary artery disease     Diabetes mellitus 20 years     am 09/29/2014    Diabetes mellitus type II 1994     am 12/22/2015    DM (diabetes mellitus) 1994    BS 78 am 01/10/2017    Hyperlipemia     Hypertension     CHARLES (obstructive sleep apnea)     Spinal cord disease     Trouble in sleeping     Type 2 diabetes mellitus with diabetic polyneuropathy, without long-term current use of insulin     Uses hearing aid     bilat       Past Surgical History:   Procedure Laterality Date    HERNIA REPAIR Bilateral 04/18/2017    LAMINECTOMY  07/22/2016    ROTATOR CUFF REPAIR Left 2006    SHOULDER ARTHROSCOPY W/ ROTATOR CUFF REPAIR Right 2009       Review of patient's allergies indicates:   Allergen Reactions    Sulfa (sulfonamide antibiotics)      Can't recall from 1995       No current facility-administered medications on file prior to encounter.      Current Outpatient Prescriptions on File Prior to Encounter   Medication Sig    metformin (GLUCOPHAGE) 1000 MG tablet TAKE 1 TABLET TWICE DAILY WITH MEALS    acetaminophen (TYLENOL) 650 MG TbSR Take 1,300 mg by mouth every 8 (eight) hours.    blood sugar diagnostic (ACCU-CHEK FRANKO PLUS TEST STRP) Strp USE  1  STRIP ONE TIME DAILY    docusate sodium (COLACE) 100 MG capsule Take 1 capsule (100 mg total) by mouth 2 (two) times daily as needed.    gabapentin (NEURONTIN) 100 MG capsule TAKE 1 CAPSULE BY MOUTH EVERY NIGHT AT BEDTIME FOR 1 WEEK, THEN INCREASE TO 2 CAPSULES EVERY NIGHT AT BEDTIME THEREAFTER    glipiZIDE (GLUCOTROL) 5 MG tablet TAKE 2 TABLETS (10 MG) TWO TIMES DAILY BEFORE MEALS    lancets (ACCU-CHEK MULTICLIX LANCET) Misc TEST ONE TIME DAILY    losartan-hydrochlorothiazide 50-12.5 mg (HYZAAR) 50-12.5 mg per tablet TAKE 1 TABLET ONE TIME DAILY    melatonin 5 mg Cap     metoprolol  succinate (TOPROL-XL) 50 MG 24 hr tablet TAKE 1 TABLET EVERY DAY    oxycodone-acetaminophen (PERCOCET) 5-325 mg per tablet Take 1 tablet by mouth every 4 (four) hours as needed for Pain.    pravastatin (PRAVACHOL) 40 MG tablet TAKE 1 TABLET EVERY DAY (Patient taking differently: TAKE 1 TABLET EVERY NIGHT)    senna (SENNA CONCENTRATE) 8.6 mg tablet Take 1 tablet by mouth once daily.    sildenafil (VIAGRA) 100 MG tablet Take 1 tablet (100 mg total) by mouth as needed. Take on an empty stomach    [DISCONTINUED] aspirin (ECOTRIN) 81 MG EC tablet Take 81 mg by mouth once daily.    [DISCONTINUED] celecoxib (CELEBREX) 200 MG capsule Take 1 capsule (200 mg total) by mouth once daily.    [DISCONTINUED] oxycodone-acetaminophen (PERCOCET)  mg per tablet Take 1 tablet by mouth every 4 (four) hours as needed.     Family History     Problem Relation (Age of Onset)    Cancer Brother (50)    Diabetes Mother, Father, Sister    Drug abuse Brother    Heart disease Mother, Father    Hypertension Mother, Father    Stroke Father        Social History Main Topics    Smoking status: Never Smoker    Smokeless tobacco: Never Used    Alcohol use 0.6 oz/week     1 Glasses of wine per week      Comment: rarely  No alcohol prior to surgery    Drug use: No    Sexual activity: Yes     Partners: Female     Birth control/ protection: Condom     Review of Systems   Constitutional: Negative for chills, diaphoresis, fatigue and fever.   HENT: Negative for drooling, ear pain, rhinorrhea and sore throat.    Eyes: Negative.    Respiratory: Negative for cough, shortness of breath and wheezing.    Cardiovascular: Negative for palpitations and leg swelling.   Gastrointestinal: Negative for abdominal pain, constipation, diarrhea and nausea.   Endocrine: Negative.    Genitourinary: Negative for dysuria, hematuria and urgency.   Musculoskeletal: Positive for arthralgias.   Skin: Negative for color change and wound.   Allergic/Immunologic:  Negative.    Neurological: Negative for dizziness, syncope and speech difficulty.   Hematological: Negative.    Psychiatric/Behavioral: Negative.      Objective:     Vital Signs (Most Recent):  Temp: 97.7 °F (36.5 °C) (10/09/17 1557)  Pulse: 78 (10/09/17 1557)  Resp: 16 (10/09/17 1557)  BP: (!) 149/73 (10/09/17 1557)  SpO2: 95 % (10/09/17 1557) Vital Signs (24h Range):  Temp:  [97.2 °F (36.2 °C)-97.7 °F (36.5 °C)] 97.7 °F (36.5 °C)  Pulse:  [64-78] 78  Resp:  [15-27] 16  SpO2:  [94 %-100 %] 95 %  BP: (140-171)/(67-90) 149/73     Weight: 98.6 kg (217 lb 6 oz)  Body mass index is 27.17 kg/m².    Physical Exam   Constitutional: He is oriented to person, place, and time. He appears well-developed and well-nourished. No distress.   HENT:   Head: Normocephalic and atraumatic.   Eyes: EOM are normal.   Neck: Normal range of motion. Neck supple.   Cardiovascular: Normal rate, regular rhythm and normal heart sounds.    Pulmonary/Chest: Effort normal and breath sounds normal. No respiratory distress.   Abdominal: Soft. Bowel sounds are normal. He exhibits no distension. There is no tenderness.   Genitourinary:   Genitourinary Comments: Caro catheter in place   Musculoskeletal: Normal range of motion. He exhibits no edema.   Left hip dressing intact. No drainage noted   Neurological: He is alert and oriented to person, place, and time.   Skin: Skin is dry.   Nursing note and vitals reviewed.    Significant Labs:   CBC:   Recent Labs  Lab 10/09/17  1443   HGB 10.7*   HCT 31.8*       Significant Imaging:   Imaging Results          FL Greater Than 1 Hour (In process)                X-Ray Hip 2 or 3 views Left (Final result)  Result time 10/09/17 14:33:05    Final result by Chandu De Jesus MD (10/09/17 14:33:05)                 Impression:      Please see above.      Electronically signed by: CHANDU DE JESUS MD  Date:     10/09/17  Time:    14:33              Narrative:    Exam: XR HIP 2 VIEW LEFT,    Date:  10/09/17  10:30:00    History: Left hip DJD.    Comparison:  11/18/2016.    Findings: Total fluoroscopy time 25.3 seconds.  2 intraoperative images obtained.  Left THR.

## 2017-10-09 NOTE — CONSULTS
[facility] Hospital Medicine  Consult Note    Patient Name: Srinath Mcknight  MRN: [id number]  Admission Date: 10/9/2017  Hospital Length of Stay: 0 days  Attending Physician: Albaro Alcocer Sr., MD   Primary Care Provider: Yeison Wilder MD     Patient information was obtained from patient.    Consults  Subjective:     Principal Problem: Arthritis of left hip    Chief Complaint: No chief complaint on file.       HPI: Srinath Mcknight is a 76 year old male with history of HTN, HLD, CAD, and DM who underwent left hip arthroplasty today performed by Dr. Alcocer.  ml. Denies discomfort at this time. Hospital medicine has been consulted for medical management.       Past Medical History:   Diagnosis Date    Anemia due to unknown mechanism 11/10/2015    Angina pectoris 2014    not since    Arthritis     Back pain     Coronary artery disease     Diabetes mellitus 20 years     am 09/29/2014    Diabetes mellitus type II 1994     am 12/22/2015    DM (diabetes mellitus) 1994    BS 78 am 01/10/2017    Hyperlipemia     Hypertension     CHARLES (obstructive sleep apnea)     Spinal cord disease     Trouble in sleeping     Type 2 diabetes mellitus with diabetic polyneuropathy, without long-term current use of insulin     Uses hearing aid     bilat       Past Surgical History:   Procedure Laterality Date    HERNIA REPAIR Bilateral 04/18/2017    LAMINECTOMY  07/22/2016    ROTATOR CUFF REPAIR Left 2006    SHOULDER ARTHROSCOPY W/ ROTATOR CUFF REPAIR Right 2009       Review of patient's allergies indicates:   Allergen Reactions    Sulfa (sulfonamide antibiotics)      Can't recall from 1995       No current facility-administered medications on file prior to encounter.      Current Outpatient Prescriptions on File Prior to Encounter   Medication Sig    metformin (GLUCOPHAGE) 1000 MG tablet TAKE 1 TABLET TWICE DAILY WITH MEALS    acetaminophen (TYLENOL) 650 MG TbSR Take 1,300 mg by mouth  every 8 (eight) hours.    blood sugar diagnostic (ACCU-CHEK FRANKO PLUS TEST STRP) Strp USE  1  STRIP ONE TIME DAILY    docusate sodium (COLACE) 100 MG capsule Take 1 capsule (100 mg total) by mouth 2 (two) times daily as needed.    gabapentin (NEURONTIN) 100 MG capsule TAKE 1 CAPSULE BY MOUTH EVERY NIGHT AT BEDTIME FOR 1 WEEK, THEN INCREASE TO 2 CAPSULES EVERY NIGHT AT BEDTIME THEREAFTER    glipiZIDE (GLUCOTROL) 5 MG tablet TAKE 2 TABLETS (10 MG) TWO TIMES DAILY BEFORE MEALS    lancets (ACCU-CHEK MULTICLIX LANCET) Misc TEST ONE TIME DAILY    losartan-hydrochlorothiazide 50-12.5 mg (HYZAAR) 50-12.5 mg per tablet TAKE 1 TABLET ONE TIME DAILY    melatonin 5 mg Cap     metoprolol succinate (TOPROL-XL) 50 MG 24 hr tablet TAKE 1 TABLET EVERY DAY    oxycodone-acetaminophen (PERCOCET) 5-325 mg per tablet Take 1 tablet by mouth every 4 (four) hours as needed for Pain.    pravastatin (PRAVACHOL) 40 MG tablet TAKE 1 TABLET EVERY DAY (Patient taking differently: TAKE 1 TABLET EVERY NIGHT)    senna (SENNA CONCENTRATE) 8.6 mg tablet Take 1 tablet by mouth once daily.    sildenafil (VIAGRA) 100 MG tablet Take 1 tablet (100 mg total) by mouth as needed. Take on an empty stomach    [DISCONTINUED] aspirin (ECOTRIN) 81 MG EC tablet Take 81 mg by mouth once daily.    [DISCONTINUED] celecoxib (CELEBREX) 200 MG capsule Take 1 capsule (200 mg total) by mouth once daily.    [DISCONTINUED] oxycodone-acetaminophen (PERCOCET)  mg per tablet Take 1 tablet by mouth every 4 (four) hours as needed.     Family History     Problem Relation (Age of Onset)    Cancer Brother (50)    Diabetes Mother, Father, Sister    Drug abuse Brother    Heart disease Mother, Father    Hypertension Mother, Father    Stroke Father        Social History Main Topics    Smoking status: Never Smoker    Smokeless tobacco: Never Used    Alcohol use 0.6 oz/week     1 Glasses of wine per week      Comment: rarely  No alcohol prior to surgery    Drug  use: No    Sexual activity: Yes     Partners: Female     Birth control/ protection: Condom     Review of Systems   Constitutional: Negative for chills, diaphoresis, fatigue and fever.   HENT: Negative for drooling, ear pain, rhinorrhea and sore throat.    Eyes: Negative.    Respiratory: Negative for cough, shortness of breath and wheezing.    Cardiovascular: Negative for palpitations and leg swelling.   Gastrointestinal: Negative for abdominal pain, constipation, diarrhea and nausea.   Endocrine: Negative.    Genitourinary: Negative for dysuria, hematuria and urgency.   Musculoskeletal: Positive for arthralgias.   Skin: Negative for color change and wound.   Allergic/Immunologic: Negative.    Neurological: Negative for dizziness, syncope and speech difficulty.   Hematological: Negative.    Psychiatric/Behavioral: Negative.      Objective:     Vital Signs (Most Recent):  Temp: 97.7 °F (36.5 °C) (10/09/17 1557)  Pulse: 78 (10/09/17 1557)  Resp: 16 (10/09/17 1557)  BP: (!) 149/73 (10/09/17 1557)  SpO2: 95 % (10/09/17 1557) Vital Signs (24h Range):  Temp:  [97.2 °F (36.2 °C)-97.7 °F (36.5 °C)] 97.7 °F (36.5 °C)  Pulse:  [64-78] 78  Resp:  [15-27] 16  SpO2:  [94 %-100 %] 95 %  BP: (140-171)/(67-90) 149/73     Weight: 98.6 kg (217 lb 6 oz)  Body mass index is 27.17 kg/m².    Physical Exam   Constitutional: He is oriented to person, place, and time. He appears well-developed and well-nourished. No distress.   HENT:   Head: Normocephalic and atraumatic.   Eyes: EOM are normal.   Neck: Normal range of motion. Neck supple.   Cardiovascular: Normal rate, regular rhythm and normal heart sounds.    Pulmonary/Chest: Effort normal and breath sounds normal. No respiratory distress.   Abdominal: Soft. Bowel sounds are normal. He exhibits no distension. There is no tenderness.   Genitourinary:   Genitourinary Comments: Acro catheter in place   Musculoskeletal: Normal range of motion. He exhibits no edema.   Left hip dressing  intact. No drainage noted   Neurological: He is alert and oriented to person, place, and time.   Skin: Skin is dry.   Nursing note and vitals reviewed.    Significant Labs:   CBC:   Recent Labs  Lab 10/09/17  1443   HGB 10.7*   HCT 31.8*       Significant Imaging:   Imaging Results          FL Greater Than 1 Hour (In process)                X-Ray Hip 2 or 3 views Left (Final result)  Result time 10/09/17 14:33:05    Final result by Chandu De Jesus MD (10/09/17 14:33:05)                 Impression:      Please see above.      Electronically signed by: CHANDU DE JESUS MD  Date:     10/09/17  Time:    14:33              Narrative:    Exam: XR HIP 2 VIEW LEFT,    Date:  10/09/17 10:30:00    History: Left hip DJD.    Comparison:  11/18/2016.    Findings: Total fluoroscopy time 25.3 seconds.  2 intraoperative images obtained.  Left THR.                                Assessment/Plan:     * Arthritis of left hip    -status post left hip arthroplasty performed by Dr. Alcocer  -monitor serial H/H  -Eliquis to start in AM for DVT prophylaxis  -PT/OT evaluation  -Social work for discharge planning        Combined hyperlipidemia associated with type 2 diabetes mellitus    -recent HgA1c was 6.4  -Hold metformin and Glipizide  -insulin sliding scale              VTE Risk Mitigation         Ordered     apixaban tablet 2.5 mg  2 times daily     Route:  Oral        10/09/17 1609     Place BA hose  Until discontinued      10/09/17 1610     Place sequential compression device  Until discontinued      10/09/17 1610     High Risk of VTE  Once      10/09/17 1609          Thank you for your consult. I will follow-up with patient. Please contact us if you have any additional questions.    Jordan Alonso NP  Department of Hospital Medicine   [facility]

## 2017-10-10 PROBLEM — D62 ACUTE BLOOD LOSS ANEMIA: Status: ACTIVE | Noted: 2017-10-10

## 2017-10-10 LAB
ESTIMATED AVG GLUCOSE: 140 MG/DL
HBA1C MFR BLD HPLC: 6.5 %
HCT VFR BLD AUTO: 26.9 %
HGB BLD-MCNC: 9.1 G/DL
POCT GLUCOSE: 188 MG/DL (ref 70–110)
POCT GLUCOSE: 195 MG/DL (ref 70–110)
POCT GLUCOSE: 207 MG/DL (ref 70–110)

## 2017-10-10 PROCEDURE — G8988 SELF CARE GOAL STATUS: HCPCS | Mod: CJ

## 2017-10-10 PROCEDURE — 97530 THERAPEUTIC ACTIVITIES: CPT

## 2017-10-10 PROCEDURE — 97116 GAIT TRAINING THERAPY: CPT

## 2017-10-10 PROCEDURE — G8987 SELF CARE CURRENT STATUS: HCPCS | Mod: CK

## 2017-10-10 PROCEDURE — 25000003 PHARM REV CODE 250: Performed by: ORTHOPAEDIC SURGERY

## 2017-10-10 PROCEDURE — 36415 COLL VENOUS BLD VENIPUNCTURE: CPT

## 2017-10-10 PROCEDURE — G8978 MOBILITY CURRENT STATUS: HCPCS | Mod: CJ

## 2017-10-10 PROCEDURE — 25000003 PHARM REV CODE 250: Performed by: NURSE PRACTITIONER

## 2017-10-10 PROCEDURE — 97165 OT EVAL LOW COMPLEX 30 MIN: CPT

## 2017-10-10 PROCEDURE — 97110 THERAPEUTIC EXERCISES: CPT

## 2017-10-10 PROCEDURE — 85014 HEMATOCRIT: CPT

## 2017-10-10 PROCEDURE — 97535 SELF CARE MNGMENT TRAINING: CPT

## 2017-10-10 PROCEDURE — G8979 MOBILITY GOAL STATUS: HCPCS | Mod: CI

## 2017-10-10 PROCEDURE — 96372 THER/PROPH/DIAG INJ SC/IM: CPT

## 2017-10-10 PROCEDURE — 97162 PT EVAL MOD COMPLEX 30 MIN: CPT

## 2017-10-10 PROCEDURE — 11000001 HC ACUTE MED/SURG PRIVATE ROOM

## 2017-10-10 PROCEDURE — 85018 HEMOGLOBIN: CPT

## 2017-10-10 PROCEDURE — 63600175 PHARM REV CODE 636 W HCPCS: Performed by: ORTHOPAEDIC SURGERY

## 2017-10-10 RX ADMIN — PREGABALIN 75 MG: 25 CAPSULE ORAL at 09:10

## 2017-10-10 RX ADMIN — SODIUM CHLORIDE: 0.9 INJECTION, SOLUTION INTRAVENOUS at 03:10

## 2017-10-10 RX ADMIN — CEFAZOLIN SODIUM 2 G: 2 SOLUTION INTRAVENOUS at 02:10

## 2017-10-10 RX ADMIN — DOCUSATE SODIUM 100 MG: 100 CAPSULE, LIQUID FILLED ORAL at 09:10

## 2017-10-10 RX ADMIN — OXYCODONE HYDROCHLORIDE 10 MG: 5 TABLET ORAL at 06:10

## 2017-10-10 RX ADMIN — OXYCODONE HYDROCHLORIDE 10 MG: 5 TABLET ORAL at 01:10

## 2017-10-10 RX ADMIN — APIXABAN 2.5 MG: 2.5 TABLET, FILM COATED ORAL at 09:10

## 2017-10-10 RX ADMIN — SODIUM CHLORIDE: 0.9 INJECTION, SOLUTION INTRAVENOUS at 07:10

## 2017-10-10 RX ADMIN — POLYETHYLENE GLYCOL 3350 17 G: 17 POWDER, FOR SOLUTION ORAL at 09:10

## 2017-10-10 RX ADMIN — ACETAMINOPHEN 1000 MG: 10 INJECTION, SOLUTION INTRAVENOUS at 01:10

## 2017-10-10 RX ADMIN — ACETAMINOPHEN 1000 MG: 10 INJECTION, SOLUTION INTRAVENOUS at 06:10

## 2017-10-10 RX ADMIN — PRAMIPEXOLE DIHYDROCHLORIDE 0.25 MG: 0.25 TABLET ORAL at 09:10

## 2017-10-10 RX ADMIN — THERA TABS 1 TABLET: TAB at 09:10

## 2017-10-10 RX ADMIN — INSULIN ASPART 2 UNITS: 100 INJECTION, SOLUTION INTRAVENOUS; SUBCUTANEOUS at 11:10

## 2017-10-10 RX ADMIN — INSULIN ASPART 4 UNITS: 100 INJECTION, SOLUTION INTRAVENOUS; SUBCUTANEOUS at 05:10

## 2017-10-10 RX ADMIN — OXYCODONE HYDROCHLORIDE 10 MG: 5 TABLET ORAL at 10:10

## 2017-10-10 RX ADMIN — CLONAZEPAM 0.5 MG: 0.5 TABLET ORAL at 09:10

## 2017-10-10 RX ADMIN — METOPROLOL SUCCINATE 50 MG: 50 TABLET, EXTENDED RELEASE ORAL at 09:10

## 2017-10-10 NOTE — PLAN OF CARE
Problem: Patient Care Overview  Goal: Plan of Care Review  Outcome: Ongoing (interventions implemented as appropriate)  Iv abx given per order, Pt remains free of injury, pain managed adequately, no s/s of distress. 24 hour chart check completed. Will continue to monitor.

## 2017-10-10 NOTE — PT/OT/SLP EVAL
Physical Therapy  Evaluation    Srinath Mcknight   MRN: [id number]   Admitting Diagnosis: Arthritis of left hip    PT Received On: 10/10/17  PT Start Time: 0730     PT Stop Time: 0810    PT Total Time (min): 40 min       Billable Minutes:  Evaluation 15, Gait [id number] and Therapeutic Activity 10    Diagnosis: Arthritis of left hip  P.T. TX DX: IMPAIRED FUNCTIONAL MOBILITY    Past Medical History:   Diagnosis Date    Anemia due to unknown mechanism 11/10/2015    Angina pectoris 2014    not since    Arthritis     Back pain     Coronary artery disease     Diabetes mellitus 20 years     am 09/29/2014    Diabetes mellitus type II 1994     am 12/22/2015    DM (diabetes mellitus) 1994    BS 80 am 01/10/2017    Hyperlipemia     Hypertension     CHARLES (obstructive sleep apnea)     Spinal cord disease     Trouble in sleeping     Type 2 diabetes mellitus with diabetic polyneuropathy, without long-term current use of insulin     Uses hearing aid     bilat      Past Surgical History:   Procedure Laterality Date    HERNIA REPAIR Bilateral 04/18/2017    LAMINECTOMY  07/22/2016    ROTATOR CUFF REPAIR Left 2006    SHOULDER ARTHROSCOPY W/ ROTATOR CUFF REPAIR Right 2009     Referring physician: DR. BAUTISTA  Date referred to PT: 10-9-17    General Precautions: Standard, fall  Orthopedic Precautions: LLE weight bearing as tolerated   Braces: N/A       Patient History:  Lives With: alone  Living Arrangements: house  Home Layout: Able to live on 1st floor  Transportation Available: car  Living Environment Comment: PT LIVES ALONE BUT HAS A FRIEND TO ASSIST, PT VALERIY WITH FUNCTIONAL MOBILITY PTA  Equipment Currently Used at Home: cane, straight  DME owned (not currently used): rolling walker and bedside commode    Previous Level of Function:  Ambulation Skills: needs device (AMB WITH SPC LIMITED COMMUNITY DISTANCES)  Transfer Skills: independent  ADL Skills: independent    Subjective:  Communicated with NURSE SNELL  prior to session.  Pain/Comfort  Pain Rating 1: 2/10  Location - Side 1: Left  Location - Orientation 1: generalized  Location 1: hip    Objective:   Patient found with: telemetry, SCD     Cognitive Exam:  Oriented to: Person, Place, Time and Situation    Follows Commands/attention: Follows one-step commands  Communication: clear/fluent  Safety awareness/insight to disability: impaired    Physical Exam:  Postural examination/scapula alignment: Rounded shoulder    Skin integrity: Visible skin intact  Edema: None noted     Sensation:   Impaired  light/touch BLE TOES TO KNEE'S, PT C/O TINGLING BURNING PAIN TO BOTH FEET    Upper Extremity Range of Motion:  Right Upper Extremity: WFL  Left Upper Extremity: WFL    Upper Extremity Strength:  Right Upper Extremity: WFL  Left Upper Extremity: WFL    Lower Extremity Range of Motion:  Right Lower Extremity: WFL  Left Lower Extremity: WFL    Lower Extremity Strength:  Right Lower Extremity: WFL  Left Lower Extremity: NT     Functional Mobility:  Bed Mobility:  Rolling/Turning to Left: Stand by assistance  Rolling/Turning Right: Stand by assistance  Scooting/Bridging: Stand by Assistance  Supine to Sit: Stand by Assistance    Transfers:  Sit <> Stand Assistance: Stand By Assistance  Sit <> Stand Assistive Device: Rolling Walker (PT EDUCATED IN RW USE AND SAFETY DURING TF'S AND GAIT)  Bed <> Chair Technique: Stand Pivot  Bed <> Chair Assistance: Stand By Assistance  Bed <> Chair Assistive Device: Rolling Walker    Gait:   Gait Distance: PT ' WITH RW AND SBA, SLOW PACED STEADY GAIT, NO LOB OR SOB ON ROOM AIR  Assistance 1: Stand by Assistance  Gait Assistive Device: Rolling walker  Gait Pattern: swing-through gait  Gait Deviation(s): decreased osmar, decreased step length, decreased toe-to-floor clearance, decreased weight-shifting ability    Balance:   Static Sit: GOOD  Dynamic Sit: GOOD  Static Stand: FAIR  Dynamic stand: FAIR    Therapeutic Activities and  Exercises:  PT EDUCATED IN WBAT FOR LLE, ANTERIOR PRECAUTIONS FOR LLE.  PT EDUCATED IN AND PERFORMED BLE THEREX X 20 REPS AROM    AM-PAC 6 CLICK MOBILITY  How much help from another person does this patient currently need?   1 = Unable, Total/Dependent Assistance  2 = A lot, Maximum/Moderate Assistance  3 = A little, Minimum/Contact Guard/Supervision  4 = None, Modified [place]/Independent    Turning over in bed (including adjusting bedclothes, sheets and blankets)?: 4  Sitting down on and standing up from a chair with arms (e.g., wheelchair, bedside commode, etc.): 4  Moving from lying on back to sitting on the side of the bed?: 4  Moving to and from a bed to a chair (including a wheelchair)?: 4  Need to walk in hospital room?: 4  Climbing 3-5 steps with a railing?: 1 (NT)  Total Score: 21     AM-PAC Raw Score CMS G-Code Modifier Level of Impairment Assistance   6 % Total / Unable   7 - 9 CM 80 - 100% Maximal Assist   10 - 14 CL 60 - 80% Moderate Assist   15 - 19 CK 40 - 60% Moderate Assist   20 - 22 CJ 20 - 40% Minimal Assist   23 CI 1-20% SBA / CGA   24 CH 0% Independent/ Mod I     Patient left up in chair with all lines intact, call button in reach, NURSE notified and FRIEND present.    Assessment:   Srinath Mcknight is a 76 y.o. male with a medical diagnosis of Arthritis of left hip and presents with IMPAIRED FUNCTIONAL MOBILITY. PT WILL BENEFIT FROM CONT. SKILLED P.T. TO ADDRESS IMPAIRMENTS    Rehab identified problem list/impairments: Rehab identified problem list/impairments: impaired endurance, impaired functional mobilty, impaired balance, decreased coordination, decreased safety awareness, pain    Rehab potential is good.    Activity tolerance: Good    Discharge recommendations: Discharge Facility/Level Of Care Needs: home health PT (SNF VS HOME HEALTH P.T. DEPENDING ON PROGRESS WITH POC AS WELL AS ASSISTANCE AVAILABLE TO HIM AT HOME)     Barriers to discharge: Barriers to Discharge:  Decreased caregiver support    Equipment recommendations: Equipment Needed After Discharge: bath bench     GOALS:    Physical Therapy Goals        Problem: Physical Therapy Goal    Goal Priority Disciplines Outcome Goal Variances Interventions   Physical Therapy Goal     PT/OT, PT      Description:  LTG'S TO BE ME IN 7 DAYS (10-17-17)  1. PT WILL BE VALERIY WITH BED MOBILITY  2. PT WILL BE VALERIY WITH TF'S  3. PT WILL ' WITH RW VALERIY  4. PT WILL TOLERATE BLE THEREX X 20 REPS AROM  5. PT WILL DEMO G DYNAMIC BALANCE DURING GAIT                  PLAN:    Patient to be seen 5 x/week to address the above listed problems via gait training, therapeutic activities, therapeutic exercises  Plan of Care expires: 10/17/17  Plan of Care reviewed with: patient     PT ENCOURAGED TO CALL FOR ASSISTANCE WITH ALL NEEDS DUE TO FALL RISK STATUS, PT AGREEABLE.  PT ENCOURAGED TO INCREASE TIME OOB IN CHAIR, ALL MEALS IN CHAIR OOB, PT AGREEABLE    Functional Assessment Tool Used: BOSTON AMPAC  Score: 21  Functional Limitation: Mobility: Walking and moving around  Mobility: Walking and Moving Around Current Status (): CJ  Mobility: Walking and Moving Around Goal Status (): LIONEL Little, PT  10/10/2017

## 2017-10-10 NOTE — SUBJECTIVE & OBJECTIVE
Interval History:Doing well. Sitting in chair. H/H 9/26 today. Ambulated down hallway with PT today.      Review of Systems   Constitutional: Negative for chills, diaphoresis, fatigue and fever.   HENT: Negative for drooling, ear pain, rhinorrhea and sore throat.    Eyes: Negative.    Respiratory: Negative for cough, shortness of breath and wheezing.    Cardiovascular: Negative for palpitations and leg swelling.   Gastrointestinal: Negative for abdominal pain, constipation, diarrhea and nausea.   Endocrine: Negative.    Genitourinary: Negative for dysuria, hematuria and urgency.   Musculoskeletal: Positive for arthralgias (left hip pain).   Skin: Negative for color change and wound.   Allergic/Immunologic: Negative.    Neurological: Negative for dizziness, syncope and speech difficulty.   Hematological: Negative.    Psychiatric/Behavioral: Negative.       Objective:     Vital Signs (Most Recent):  Temp: 98.2 °F (36.8 °C) (10/10/17 0954)  Pulse: 85 (10/10/17 0954)  Resp: 18 (10/10/17 0954)  BP: 138/64 (10/10/17 0954)  SpO2: 100 % (10/10/17 0954) Vital Signs (24h Range):  Temp:  [97.2 °F (36.2 °C)-98.2 °F (36.8 °C)] 98.2 °F (36.8 °C)  Pulse:  [71-94] 85  Resp:  [15-27] 18  SpO2:  [94 %-100 %] 100 %  BP: (109-171)/(60-90) 138/64     Weight: 98.6 kg (217 lb 6 oz)  Body mass index is 27.17 kg/m².    Intake/Output Summary (Last 24 hours) at 10/10/17 1044  Last data filed at 10/10/17 0440   Gross per 24 hour   Intake             2450 ml   Output             1775 ml   Net              675 ml      Physical Exam   Constitutional: He is oriented to person, place, and time. He appears well-developed and well-nourished. No distress.   HENT:   Head: Normocephalic and atraumatic.   Eyes: EOM are normal.   Neck: Normal range of motion. Neck supple.   Cardiovascular: Normal rate, regular rhythm and normal heart sounds.    Pulmonary/Chest: Effort normal and breath sounds normal. No respiratory distress.   Abdominal: Soft. Bowel  sounds are normal. He exhibits no distension. There is no tenderness.   Genitourinary:   Genitourinary Comments: Caro catheter in place   Musculoskeletal: Normal range of motion. He exhibits no edema.   Left hip dressing intact. No drainage noted   Neurological: He is alert and oriented to person, place, and time.   Skin: Skin is dry.   Nursing note and vitals reviewed.    Significant Labs:   CBC:   Recent Labs  Lab 10/09/17  1443 10/10/17  0503   HGB 10.7* 9.1*   HCT 31.8* 26.9*     Significant Imaging:   Imaging Results          FL Greater Than 1 Hour (In process)                X-Ray Hip 2 or 3 views Left (Final result)  Result time 10/09/17 14:33:05    Final result by Chandu De Jesus MD (10/09/17 14:33:05)                 Impression:      Please see above.      Electronically signed by: CHANDU DE JESUS MD  Date:     10/09/17  Time:    14:33              Narrative:    Exam: XR HIP 2 VIEW LEFT,    Date:  10/09/17 10:30:00    History: Left hip DJD.    Comparison:  11/18/2016.    Findings: Total fluoroscopy time 25.3 seconds.  2 intraoperative images obtained.  Left THR.

## 2017-10-10 NOTE — PT/OT/SLP PROGRESS
Physical Therapy  Treatment    Srinath Mcknight   MRN: [id number]   Admitting Diagnosis: Arthritis of left hip    PT Received On: 10/10/17  PT Start Time: 1015     PT Stop Time: 1040    PT Total Time (min): 25 min       Billable Minutes:  Gait [id number] and Therapeutic Exercise 10    Treatment Type: Treatment  PT/PTA: PT       General Precautions: Standard, fall  Orthopedic Precautions: LLE weight bearing as tolerated   Braces: N/A    Subjective:  Communicated with NURSE CHANNING prior to session.  Pain/Comfort  Pain Rating 1: 9/10  Location - Side 1: Left  Location - Orientation 1: generalized  Location 1: hip    Objective:   Patient found with: telemetry, SCD    Functional Mobility:  Bed Mobility:   Rolling/Turning to Left: Stand by assistance  Rolling/Turning Right: Stand by assistance  Scooting/Bridging: Stand by Assistance  Supine to Sit: Stand by Assistance    Transfers:  Sit <> Stand Assistance: Stand By Assistance  Sit <> Stand Assistive Device: Rolling Walker  Bed <> Chair Technique: Stand Pivot  Bed <> Chair Assistance: Stand By Assistance  Bed <> Chair Assistive Device: Rolling Walker    Gait:   Gait Distance: PT AMB 50' X 2 TRIALS WITH RW AND SBA, SLOW PACED GAIT, MILDLY UNSTEADY DUE TO INCREASED PAIN, SEVERAL SMALL STANDING REST DUE TO PAIN, NO LOB  Assistance 1: Stand by Assistance  Gait Assistive Device: Rolling walker  Gait Pattern: swing-through gait  Gait Deviation(s): decreased osmar, decreased step length, decreased toe-to-floor clearance, decreased weight-shifting ability    Balance:   Static Sit: GOOD  Dynamic Sit: GOOD  Static Stand: FAIR  Dynamic stand:  FAIR-    Therapeutic Activities and Exercises:  REVIEW RW USE AND SAFETY DURING TF'S AND GAIT, PT AWARE OF WBAT TO LLE.  PT EDUCATED IN AND PERFORMED BLE THEREX X 20 REPS AROM WITH REST    AM-PAC 6 CLICK MOBILITY  How much help from another person does this patient currently need?   1 = Unable, Total/Dependent Assistance  2 = A lot,  Maximum/Moderate Assistance  3 = A little, Minimum/Contact Guard/Supervision  4 = None, Modified [place]/Independent    Turning over in bed (including adjusting bedclothes, sheets and blankets)?: 4  Sitting down on and standing up from a chair with arms (e.g., wheelchair, bedside commode, etc.): 4  Moving from lying on back to sitting on the side of the bed?: 4  Moving to and from a bed to a chair (including a wheelchair)?: 4  Need to walk in hospital room?: 4  Climbing 3-5 steps with a railing?: 1  Total Score: 21    AM-PAC Raw Score CMS G-Code Modifier Level of Impairment Assistance   6 % Total / Unable   7 - 9 CM 80 - 100% Maximal Assist   10 - 14 CL 60 - 80% Moderate Assist   15 - 19 CK 40 - 60% Moderate Assist   20 - 22 CJ 20 - 40% Minimal Assist   23 CI 1-20% SBA / CGA   24 CH 0% Independent/ Mod I     Patient left up in chair with all lines intact, call button in reach, NURSE notified and BROTHER present.    Assessment:  Srinath Mcknight is a 76 y.o. male with a medical diagnosis of Arthritis of left hip   PT WILL BENEFIT FROM CONT. SKILLED P.T. TO ADDRESS IMPAIRMENTS    Rehab identified problem list/impairments: Rehab identified problem list/impairments: impaired endurance, impaired functional mobilty, gait instability, impaired balance, decreased coordination, decreased safety awareness, pain    Rehab potential is good.    Activity tolerance: Good    Discharge recommendations: Discharge Facility/Level Of Care Needs: home health PT, nursing facility, skilled     Barriers to discharge: Barriers to Discharge: Decreased caregiver support    Equipment recommendations: Equipment Needed After Discharge: bath bench     GOALS:    Physical Therapy Goals        Problem: Physical Therapy Goal    Goal Priority Disciplines Outcome Goal Variances Interventions   Physical Therapy Goal     PT/OT, PT      Description:  LTG'S TO BE ME IN 7 DAYS (10-17-17)  1. PT WILL BE VALERIY WITH BED MOBILITY  2. PT WILL BE VALERIY  WITH TF'S  3. PT WILL ' WITH RW VALERIY  4. PT WILL TOLERATE BLE THEREX X 20 REPS AROM  5. PT WILL DEMO G DYNAMIC BALANCE DURING GAIT                  PLAN:    Patient to be seen 5 x/week  to address the above listed problems via gait training, therapeutic activities, therapeutic exercises  Plan of Care expires: 10/17/17  Plan of Care reviewed with: patient, sibling     PT ENCOURAGED TO CALL FOR ASSISTANCE WITH ALL NEEDS DUE TO FALL RISK STATUS, PT AGREEABLE.  PT ENCOURAGED TO INCREASE TIME OOB IN CHAIR, ALL MEALS IN CHAIR OOB, PT AGREEABLE    PT G-Codes  Functional Assessment Tool Used: BOSTON AMPA  Score: 21  Functional Limitation: Mobility: Walking and moving around  Mobility: Walking and Moving Around Current Status (): CJ  Mobility: Walking and Moving Around Goal Status (): LIONEL Little, PT  10/10/2017

## 2017-10-10 NOTE — ANESTHESIA POSTPROCEDURE EVALUATION
"Anesthesia Post Evaluation    Patient: Srinath Mcknight    Procedure(s) Performed: Procedure(s) (LRB):  ARTHROPLASTY-HIP (ANTERIOR APPROACH) (Left)    [facility] Anesthesia Post Op Evaluation    Visit Vitals  BP (!) 149/73 (BP Location: Left arm, Patient Position: Lying)   Pulse 78   Temp 36.5 °C (97.7 °F) (Axillary)   Resp 16   Ht 6' 3" (1.905 m)   Wt 98.6 kg (217 lb 6 oz)   SpO2 95%   BMI 27.17 kg/m²       Pain/Niki Score: Pain Assessment Performed: Yes (10/9/2017  4:00 PM)  Presence of Pain: complains of pain/discomfort (10/9/2017  4:00 PM)  Pain Rating Prior to Med Admin: 0 (10/9/2017  6:35 PM)  Pain Rating Post Med Admin: 2 (10/9/2017  4:00 PM)  Niki Score: 10 (10/9/2017  3:41 PM)      "

## 2017-10-10 NOTE — PROGRESS NOTES
[facility] Hospital Medicine  Progress Note    Patient Name: Srinath Mcknight  MRN: [id number]  Patient Class: IP- Inpatient   Admission Date: 10/9/2017  Length of Stay: 1 days  Attending Physician: Albaro Alcocer Sr., MD  Primary Care Provider: Yeison Wilder MD    Subjective:     Principal Problem:Arthritis of left hip    HPI:  Srinath Mcknight is a 76 year old male with history of HTN, HLD, CAD, and DM who underwent left hip arthroplasty today performed by Dr. Alcocer.  ml. Denies discomfort at this time. Hospital medicine has been consulted for medical management.       Hospital Course:  No notes on file    Interval History:Doing well. Sitting in chair. H/H 9/26 today. Ambulated down hallway with PT today.      Review of Systems   Constitutional: Negative for chills, diaphoresis, fatigue and fever.   HENT: Negative for drooling, ear pain, rhinorrhea and sore throat.    Eyes: Negative.    Respiratory: Negative for cough, shortness of breath and wheezing.    Cardiovascular: Negative for palpitations and leg swelling.   Gastrointestinal: Negative for abdominal pain, constipation, diarrhea and nausea.   Endocrine: Negative.    Genitourinary: Negative for dysuria, hematuria and urgency.   Musculoskeletal: Positive for arthralgias (left hip pain).   Skin: Negative for color change and wound.   Allergic/Immunologic: Negative.    Neurological: Negative for dizziness, syncope and speech difficulty.   Hematological: Negative.    Psychiatric/Behavioral: Negative.       Objective:     Vital Signs (Most Recent):  Temp: 98.2 °F (36.8 °C) (10/10/17 0954)  Pulse: 85 (10/10/17 0954)  Resp: 18 (10/10/17 0954)  BP: 138/64 (10/10/17 0954)  SpO2: 100 % (10/10/17 0954) Vital Signs (24h Range):  Temp:  [97.2 °F (36.2 °C)-98.2 °F (36.8 °C)] 98.2 °F (36.8 °C)  Pulse:  [71-94] 85  Resp:  [15-27] 18  SpO2:  [94 %-100 %] 100 %  BP: (109-171)/(60-90) 138/64     Weight: 98.6 kg (217 lb 6 oz)  Body mass index is 27.17  kg/m².    Intake/Output Summary (Last 24 hours) at 10/10/17 1044  Last data filed at 10/10/17 0440   Gross per 24 hour   Intake             2450 ml   Output             1775 ml   Net              675 ml      Physical Exam   Constitutional: He is oriented to person, place, and time. He appears well-developed and well-nourished. No distress.   HENT:   Head: Normocephalic and atraumatic.   Eyes: EOM are normal.   Neck: Normal range of motion. Neck supple.   Cardiovascular: Normal rate, regular rhythm and normal heart sounds.    Pulmonary/Chest: Effort normal and breath sounds normal. No respiratory distress.   Abdominal: Soft. Bowel sounds are normal. He exhibits no distension. There is no tenderness.   Genitourinary:   Genitourinary Comments: Caro catheter in place   Musculoskeletal: Normal range of motion. He exhibits no edema.   Left hip dressing intact. No drainage noted   Neurological: He is alert and oriented to person, place, and time.   Skin: Skin is dry.   Nursing note and vitals reviewed.    Significant Labs:   CBC:   Recent Labs  Lab 10/09/17  1443 10/10/17  0503   HGB 10.7* 9.1*   HCT 31.8* 26.9*     Significant Imaging:   Imaging Results          FL Greater Than 1 Hour (In process)                X-Ray Hip 2 or 3 views Left (Final result)  Result time 10/09/17 14:33:05    Final result by Chandu De Jesus MD (10/09/17 14:33:05)                 Impression:      Please see above.      Electronically signed by: CHANDU DE JESUS MD  Date:     10/09/17  Time:    14:33              Narrative:    Exam: XR HIP 2 VIEW LEFT,    Date:  10/09/17 10:30:00    History: Left hip DJD.    Comparison:  11/18/2016.    Findings: Total fluoroscopy time 25.3 seconds.  2 intraoperative images obtained.  Left THR.                                Assessment/Plan:      * Arthritis of left hip    -status post left hip arthroplasty performed by Dr. Alcocer  -monitor serial H/H  -Eliquis for DVT prophylaxis  -PT/OT following  -Social  work for discharge planning        Acute blood loss anemia    -due to recent surgery  -stable Monitor        Combined hyperlipidemia associated with type 2 diabetes mellitus    -recent HgA1c was 6.4  -Hold metformin and Glipizide  -insulin sliding scale          VTE Risk Mitigation         Ordered     apixaban tablet 2.5 mg  2 times daily     Route:  Oral        10/09/17 1609     Place BA hose  Until discontinued      10/09/17 1610     Place sequential compression device  Until discontinued      10/09/17 1610     High Risk of VTE  Once      10/09/17 1609      Doing well. Will sign off. Re-consult if needed    Jordan Alonso NP  Department of Hospital Medicine   [facility]

## 2017-10-10 NOTE — PROGRESS NOTES
SUBJECTIVE:  Patient is status post Left YAHIR.  Patient complains of  3/ 10 pain that is better with the  pain meds and aggravated with movement.      Past Medical History:   Diagnosis Date    Anemia due to unknown mechanism 11/10/2015    Angina pectoris 2014    not since    Arthritis     Back pain     Coronary artery disease     Diabetes mellitus 20 years     am 09/29/2014    Diabetes mellitus type II 1994     am 12/22/2015    DM (diabetes mellitus) 1994    BS 78 am 01/10/2017    Hyperlipemia     Hypertension     CHARLES (obstructive sleep apnea)     Spinal cord disease     Trouble in sleeping     Type 2 diabetes mellitus with diabetic polyneuropathy, without long-term current use of insulin     Uses hearing aid     bilat     Past Surgical History:   Procedure Laterality Date    HERNIA REPAIR Bilateral 04/18/2017    LAMINECTOMY  07/22/2016    ROTATOR CUFF REPAIR Left 2006    SHOULDER ARTHROSCOPY W/ ROTATOR CUFF REPAIR Right 2009     Review of patient's allergies indicates:   Allergen Reactions    Sulfa (sulfonamide antibiotics)      Can't recall from 1995     No current facility-administered medications on file prior to encounter.      Current Outpatient Prescriptions on File Prior to Encounter   Medication Sig Dispense Refill    metformin (GLUCOPHAGE) 1000 MG tablet TAKE 1 TABLET TWICE DAILY WITH MEALS 180 tablet 4    acetaminophen (TYLENOL) 650 MG TbSR Take 1,300 mg by mouth every 8 (eight) hours.      blood sugar diagnostic (ACCU-CHEK FRANKO PLUS TEST STRP) Strp USE  1  STRIP ONE TIME DAILY 100 strip 4    docusate sodium (COLACE) 100 MG capsule Take 1 capsule (100 mg total) by mouth 2 (two) times daily as needed. 60 capsule 0    gabapentin (NEURONTIN) 100 MG capsule TAKE 1 CAPSULE BY MOUTH EVERY NIGHT AT BEDTIME FOR 1 WEEK, THEN INCREASE TO 2 CAPSULES EVERY NIGHT AT BEDTIME THEREAFTER 180 capsule 1    glipiZIDE (GLUCOTROL) 5 MG tablet TAKE 2 TABLETS (10 MG) TWO TIMES DAILY BEFORE  "MEALS 360 tablet 4    lancets (ACCU-CHEK MULTICLIX LANCET) Misc TEST ONE TIME DAILY 100 each 3    losartan-hydrochlorothiazide 50-12.5 mg (HYZAAR) 50-12.5 mg per tablet TAKE 1 TABLET ONE TIME DAILY 90 tablet 4    melatonin 5 mg Cap       metoprolol succinate (TOPROL-XL) 50 MG 24 hr tablet TAKE 1 TABLET EVERY DAY 90 tablet 3    oxycodone-acetaminophen (PERCOCET) 5-325 mg per tablet Take 1 tablet by mouth every 4 (four) hours as needed for Pain. 65 tablet 0    pravastatin (PRAVACHOL) 40 MG tablet TAKE 1 TABLET EVERY DAY (Patient taking differently: TAKE 1 TABLET EVERY NIGHT) 90 tablet 4    senna (SENNA CONCENTRATE) 8.6 mg tablet Take 1 tablet by mouth once daily. 60 tablet 0    sildenafil (VIAGRA) 100 MG tablet Take 1 tablet (100 mg total) by mouth as needed. Take on an empty stomach 6 tablet 11     /63 (BP Location: Left arm, Patient Position: Lying)   Pulse 76   Temp 98 °F (36.7 °C) (Oral)   Resp 18   Ht 6' 3" (1.905 m)   Wt 98.6 kg (217 lb 6 oz)   SpO2 96%   BMI 27.17 kg/m²    ROS:  GENERAL: No fever, chills, fatigability or weight loss.  SKIN: No rashes, itching or changes in color or texture of skin.  HEAD: No headaches or recent head trauma.  EYES: Visual acuity fine. No photophobia, ocular pain or diplopia.  EARS: Denies ear pain, discharge or vertigo.  NOSE: No loss of smell, no epistaxis or postnasal drip.  MOUTH & THROAT: No hoarseness or change in voice. No excessive gum bleeding.  NODES: Denies swollen glands.  CHEST: Denies DAMON, cyanosis, wheezing, cough and sputum production.  CARDIOVASCULAR: Denies chest pain, PND, orthopnea or reduced exercise tolerance.  ABDOMEN: Appetite fine. No weight loss. Denies diarrhea, abdominal pain, hematemesis or blood in stool.  URINARY: No flank pain, dysuria or hematuria.  PERIPHERAL VASCULAR: No claudication or cyanosis.  NEUROLOGIC: No history of seizures, paralysis, alteration of gait or coordination.  MUSCULOSKELETAL: See HPI    PE:  APPEARANCE: " "Well nourished, well developed, in no acute distress.   HEAD: Normocephalic, atraumatic.  EYES: PERRL. EOMI.   EARS: TM's intact. Light reflex normal. No retraction or perforation.   NOSE: Mucosa pink. Airway clear.  MOUTH & THROAT: No tonsillar enlargement. No pharyngeal erythema or exudate. No stridor.  NECK: Supple.   NODES: No cervical, axillary or inguinal lymph node enlargement.  CHEST: Lungs clear to auscultation.  CARDIOVASCULAR: Normal S1, S2. No rubs, murmurs or gallops.  ABDOMEN: Bowel sounds normal. Not distended. Soft. No tenderness or masses.  NEUROLOGIC: Cranial Nerves: II-XII grossly intact, also see MUSCULOSKELETAL  MUSCULOSKELETAL:        Left Hip   -dressing intact, 2 plus dorsalis pedis and posterior tibial artery pulses, light touch intact Left lower extremity.  All digits are warm. No erythema, no warmth, no drainage, minimum swelling, mild tenderness.  Less than 2 seconds capillary refill all digits.    /63 (BP Location: Left arm, Patient Position: Lying)   Pulse 76   Temp 98 °F (36.7 °C) (Oral)   Resp 18   Ht 6' 3" (1.905 m)   Wt 98.6 kg (217 lb 6 oz)   SpO2 96%   BMI 27.17 kg/m²     Intake/Output - Last 3 Shifts       10/08 0700 - 10/09 0659 10/09 0700 - 10/10 0659 10/10 0700 - 10/11 0659    I.V. (mL/kg)  2450 (24.8)     Total Intake(mL/kg)  2450 (24.8)     Urine (mL/kg/hr)  975     Stool  0     Blood  800     Total Output   1775      Net   +675             Stool Occurrence  0 x           Hb- 9.1  Hct-26.9    ASSESSMENT:    The patient is stable and improving.      PLAN:  Transfer to Rehab, when accepted  Continue pain medication  Continue wound care  Continue physical therapy    "

## 2017-10-10 NOTE — PLAN OF CARE
Problem: Patient Care Overview  Goal: Plan of Care Review  Outcome: Ongoing (interventions implemented as appropriate)  Pt remains free of injuries. C/o Lt hip pain. Moderately controlled c PRN meds, ice, and relaxation techniques. IVF per MD orders. Up in chair most of day. 12 hr chart check completed. Will continue to monitor.

## 2017-10-10 NOTE — PT/OT/SLP EVAL
Occupational Therapy  Evaluation    Srinath Mcknight   MRN: [id number]   Admitting Diagnosis: Arthritis of left hip    OT Date of Treatment: 10/10/17   OT Start Time: 0930  OT Stop Time: 0955  OT Total Time (min): 25 min    Billable Minutes:  Evaluation 10 MINUTES  Self Care/Home Management 15 MINUTES    Diagnosis: Arthritis of left hip   IMPAIRED ADL'S, IMPAIRED FUNCTIONAL MOBILITY, IMPAIRED FUNCTIONAL T/F'S    Past Medical History:   Diagnosis Date    Anemia due to unknown mechanism 11/10/2015    Angina pectoris 2014    not since    Arthritis     Back pain     Coronary artery disease     Diabetes mellitus 20 years     am 09/29/2014    Diabetes mellitus type II 1994     am 12/22/2015    DM (diabetes mellitus) 1994    BS 78 am 01/10/2017    Hyperlipemia     Hypertension     CHARLES (obstructive sleep apnea)     Spinal cord disease     Trouble in sleeping     Type 2 diabetes mellitus with diabetic polyneuropathy, without long-term current use of insulin     Uses hearing aid     bilat      Past Surgical History:   Procedure Laterality Date    HERNIA REPAIR Bilateral 04/18/2017    LAMINECTOMY  07/22/2016    ROTATOR CUFF REPAIR Left 2006    SHOULDER ARTHROSCOPY W/ ROTATOR CUFF REPAIR Right 2009       Referring physician: DR. BAUTISTA  Date referred to OT: 10-9-17    General Precautions: Standard, fall  Orthopedic Precautions: N/A  Braces:            Patient History:  Living Environment  Lives With: alone  Living Arrangements: house  Home Layout: Able to live on 1st floor  Living Environment Comment: pt reports lives alone in 1 story house and no steps. pt report (i) with adl's and functional mobility plof  Equipment Currently Used at Home: walker, rolling    Prior level of function:   Bed Mobility/Transfers: independent  Grooming: independent  Bathing: independent  Upper Body Dressing: independent  Lower Body Dressing: independent  Toileting: independent  Driving License: Yes     Dominant hand:  right    Subjective:  Communicated with NURSE SIN  AND EPIC CHART REVIEW prior to session.    Chief Complaint: IMPAIRED ADL'S, IMPAIRED FUNCTIONAL MOBILITY, IMPAIRED FUNCTIONAL T/F'S    Patient/Family stated goals:     Pain/Comfort  Pain Rating 1: 0/10    Objective:       Cognitive Exam:  Oriented to: Person, Place, Time and Situation  Follows Commands/attention: Follows two-step commands  Communication: clear/fluent  Memory:  No Deficits noted  Safety awareness/insight to disability: intact  Coping skills/emotional control: Appropriate to situation    Visual/perceptual:  Intact    Physical Exam:  Postural examination/scapula alignment: No postural abnormalities identified  Skin integrity: SX SITE BANDAGE  Edema: Mild L LE    Sensation:   Intact    Upper Extremity Range of Motion:  Right Upper Extremity: WFL  Left Upper Extremity: WFL    Upper Extremity Strength:  Right Upper Extremity: MMT: 4/5 GROSSLY  Left Upper Extremity: MMT: 4/5 GROSSLY   Strength: MMT: 4/5 GROSSLY    Fine motor coordination:   Intact    Gross motor coordination: WFL    Functional Mobility:  Bed Mobility:       Transfers:  Sit <> Stand Assistance: Stand By Assistance  Sit <> Stand Assistive Device: No Assistive Device    Functional Ambulation: NA    Activities of Daily Living:     Feeding adaptive equipment: NA  UE Dressing Level of Assistance: Minimum assistance  UE adaptive equipment: NA  LE Dressing Level of Assistance: Minimum assistance  LE adaptive equipment: NA                    Bathing adaptive equipment: NA    Balance:   Static Sit: GOOD+: Takes MAXIMAL challenges from all directions.    Dynamic Sit: GOOD: Maintains balance through MODERATE excursions of active trunk movement  Static Stand: FAIR+: Takes MINIMAL challenges from all directions  Dynamic stand: NA        Therapeutic Activities and Exercises:    AM-PAC 6 CLICK ADL  How much help from another person does this patient currently need?  1 = Unable, Total/Dependent  "Assistance  2 = A lot, Maximum/Moderate Assistance  3 = A little, Minimum/Contact Guard/Supervision  4 = None, Modified [place]/Independent    Putting on and taking off regular lower body clothing? : 3  Bathing (including washing, rinsing, drying)?: 3  Toileting, which includes using toilet, bedpan, or urinal? : 3  Putting on and taking off regular upper body clothing?: 3  Taking care of personal grooming such as brushing teeth?: 4  Eating meals?: 4  Total Score: 20    AM-PAC Raw Score CMS "G-Code Modifier Level of Impairment Assistance   6 % Total / Unable   7 - 9 CM 80 - 100% Maximal Assist   10-14 CL 60 - 80% Moderate Assist   15 - 19 CK 40 - 60% Moderate Assist   20 - 22 CJ 20 - 40% Minimal Assist   23 CI 1-20% SBA / CGA   24 CH 0% Independent/ Mod I       Patient left up in chair with all lines intact, call button in reach, NURSE FARREND notified and NURSE FARREND present    Assessment:  Srinath Mcknight is a 76 y.o. male with a medical diagnosis of Arthritis of left hip and presents with IMPAIRED ADL'S, IMPAIRED FUNCTIONAL MOBILITY, IMPAIRED FUNCTIONAL T/F'S. PT MAY BENEFIT FROM SKILLED O.T.     Rehab identified problem list/impairments: Rehab identified problem list/impairments: weakness, impaired self care skills, impaired balance, impaired endurance, impaired functional mobilty, gait instability    Rehab potential is good.    Activity tolerance: Good    Discharge recommendations: Discharge Facility/Level Of Care Needs: home health OT, nursing facility, skilled (snf vs hh ot)     Barriers to discharge: Barriers to Discharge: Decreased caregiver support    Equipment recommendations: bath bench     GOALS:    Occupational Therapy Goals        Problem: Occupational Therapy Goal    Goal Priority Disciplines Outcome Interventions   Occupational Therapy Goal     OT, PT/OT     Description:  OT GOALS TO BE MET BY 10-24-17  1 S WITH UE DRESSING  2. S WITH LE DRESSING  3. PT WILL TOLERATE 2 SET X 20 REPS B " UE ROM EXERCISE WITH MIN RESISTANCE                    PLAN:  Patient to be seen 3 x/week to address the above listed problems via self-care/home management, therapeutic activities, therapeutic exercises  Plan of Care expires:    Plan of Care reviewed with: patient, spouse         Nisha Singletary, OT  10/10/2017

## 2017-10-10 NOTE — ANESTHESIA POSTPROCEDURE EVALUATION
"Anesthesia Post Evaluation    Patient: Srinath Mcknight    Procedure(s) Performed: Procedure(s) (LRB):  ARTHROPLASTY-HIP (ANTERIOR APPROACH) (Left)    Final Anesthesia Type: general  Patient location during evaluation: PACU  Patient participation: No - Unable to Participate, Sedation  Level of consciousness: awake  Post-procedure vital signs: reviewed and stable  Pain management: adequate  Airway patency: patent  PONV status at discharge: No PONV  Anesthetic complications: no      Cardiovascular status: blood pressure returned to baseline  Respiratory status: unassisted  Hydration status: euvolemic  Follow-up not needed.        Visit Vitals  BP (!) 149/73 (BP Location: Left arm, Patient Position: Lying)   Pulse 78   Temp 36.5 °C (97.7 °F) (Axillary)   Resp 16   Ht 6' 3" (1.905 m)   Wt 98.6 kg (217 lb 6 oz)   SpO2 95%   BMI 27.17 kg/m²       Pain/Niki Score: Pain Assessment Performed: Yes (10/9/2017  4:00 PM)  Presence of Pain: complains of pain/discomfort (10/9/2017  4:00 PM)  Pain Rating Prior to Med Admin: 0 (10/9/2017  6:35 PM)  Pain Rating Post Med Admin: 2 (10/9/2017  4:00 PM)  Niki Score: 10 (10/9/2017  3:41 PM)      "

## 2017-10-10 NOTE — PLAN OF CARE
CM met with patient at the bedside to assess for discharge needs.  Patient states that he lives at home alone and needs to go to rehab before returning home.  CM explained that [facility], however SNF may be approved.  Patient states that his preference is [facility]. Choice form signed and placed in patient blue folder.  Referral made via right care.  CM provided a transitional care folder, information on advanced directives, information on pharmacy bedside delivery, and discharge planning begins on admission with contact information for DUNCAN Jo    D/C [facility]    CM handed off to DUNCAN Jo     10/10/17 5146   Discharge Assessment   Assessment Type Discharge Planning Assessment   Confirmed/corrected address and phone number on facesheet? Yes   Assessment information obtained from? Patient;Medical Record   Expected Length of Stay (days) (1-2)   Communicated expected length of stay with patient/caregiver yes   Prior to hospitilization cognitive status: Alert/Oriented   Prior to hospitalization functional status: Independent;Assistive Equipment   Current cognitive status: Alert/Oriented   Current Functional Status: Assistive Equipment;Needs Assistance   Facility Arrived From: home   Lives With alone   Able to Return to Prior Arrangements yes   Is patient able to care for self after discharge? Unable to determine at this time (comments)   Who are your caregiver(s) and their phone number(s)? Karina Justice, friend [phone]   Patient's perception of discharge disposition skilled nursing facility   Readmission Within The Last 30 Days no previous admission in last 30 days   Patient currently being followed by outpatient case management? No   Patient currently receives any other outside agency services? No   Equipment Currently Used at Home walker, rolling;glucometer   Do you have any problems affording any of your prescribed medications? No   Is the patient taking  medications as prescribed? yes   Does the patient have transportation home? Yes   Transportation Available family or friend will provide   Dialysis Name and Scheduled days NA   Does the patient receive services at the [facility]? No   Discharge Plan A Skilled Nursing Facility   Discharge Plan B Home Health;Home   Patient/Family In Agreement With Plan yes

## 2017-10-11 LAB
BLD PROD TYP BPU: NORMAL
BLOOD UNIT EXPIRATION DATE: NORMAL
BLOOD UNIT TYPE CODE: 6200
BLOOD UNIT TYPE: NORMAL
CODING SYSTEM: NORMAL
DISPENSE STATUS: NORMAL
HCT VFR BLD AUTO: 22.9 %
HGB BLD-MCNC: 7.9 G/DL
NUM UNITS TRANS PACKED RBC: NORMAL
POCT GLUCOSE: 200 MG/DL (ref 70–110)
POCT GLUCOSE: 215 MG/DL (ref 70–110)

## 2017-10-11 PROCEDURE — 96372 THER/PROPH/DIAG INJ SC/IM: CPT

## 2017-10-11 PROCEDURE — 97530 THERAPEUTIC ACTIVITIES: CPT

## 2017-10-11 PROCEDURE — 97110 THERAPEUTIC EXERCISES: CPT

## 2017-10-11 PROCEDURE — 63600175 PHARM REV CODE 636 W HCPCS: Performed by: ORTHOPAEDIC SURGERY

## 2017-10-11 PROCEDURE — P9016 RBC LEUKOCYTES REDUCED: HCPCS

## 2017-10-11 PROCEDURE — 36415 COLL VENOUS BLD VENIPUNCTURE: CPT

## 2017-10-11 PROCEDURE — 85014 HEMATOCRIT: CPT

## 2017-10-11 PROCEDURE — 25000003 PHARM REV CODE 250: Performed by: NURSE PRACTITIONER

## 2017-10-11 PROCEDURE — 25000003 PHARM REV CODE 250: Performed by: ORTHOPAEDIC SURGERY

## 2017-10-11 PROCEDURE — 36430 TRANSFUSION BLD/BLD COMPNT: CPT

## 2017-10-11 PROCEDURE — 85018 HEMOGLOBIN: CPT

## 2017-10-11 PROCEDURE — 11000001 HC ACUTE MED/SURG PRIVATE ROOM

## 2017-10-11 PROCEDURE — 97116 GAIT TRAINING THERAPY: CPT

## 2017-10-11 RX ORDER — HYDROCODONE BITARTRATE AND ACETAMINOPHEN 500; 5 MG/1; MG/1
TABLET ORAL
Status: DISCONTINUED | OUTPATIENT
Start: 2017-10-11 | End: 2017-10-12 | Stop reason: HOSPADM

## 2017-10-11 RX ADMIN — PRAMIPEXOLE DIHYDROCHLORIDE 0.25 MG: 0.25 TABLET ORAL at 08:10

## 2017-10-11 RX ADMIN — MORPHINE SULFATE 2 MG: 2 INJECTION, SOLUTION INTRAMUSCULAR; INTRAVENOUS at 06:10

## 2017-10-11 RX ADMIN — CLONAZEPAM 0.5 MG: 0.5 TABLET ORAL at 08:10

## 2017-10-11 RX ADMIN — APIXABAN 2.5 MG: 2.5 TABLET, FILM COATED ORAL at 08:10

## 2017-10-11 RX ADMIN — INSULIN ASPART 1 UNITS: 100 INJECTION, SOLUTION INTRAVENOUS; SUBCUTANEOUS at 11:10

## 2017-10-11 RX ADMIN — PREGABALIN 75 MG: 25 CAPSULE ORAL at 08:10

## 2017-10-11 RX ADMIN — INSULIN ASPART 4 UNITS: 100 INJECTION, SOLUTION INTRAVENOUS; SUBCUTANEOUS at 05:10

## 2017-10-11 RX ADMIN — OXYCODONE HYDROCHLORIDE 10 MG: 5 TABLET ORAL at 07:10

## 2017-10-11 RX ADMIN — SODIUM CHLORIDE: 0.9 INJECTION, SOLUTION INTRAVENOUS at 08:10

## 2017-10-11 RX ADMIN — INSULIN ASPART 2 UNITS: 100 INJECTION, SOLUTION INTRAVENOUS; SUBCUTANEOUS at 06:10

## 2017-10-11 RX ADMIN — THERA TABS 1 TABLET: TAB at 08:10

## 2017-10-11 RX ADMIN — DOCUSATE SODIUM 100 MG: 100 CAPSULE, LIQUID FILLED ORAL at 08:10

## 2017-10-11 RX ADMIN — OXYCODONE HYDROCHLORIDE 10 MG: 5 TABLET ORAL at 12:10

## 2017-10-11 RX ADMIN — METOPROLOL SUCCINATE 50 MG: 50 TABLET, EXTENDED RELEASE ORAL at 08:10

## 2017-10-11 RX ADMIN — POLYETHYLENE GLYCOL 3350 17 G: 17 POWDER, FOR SOLUTION ORAL at 08:10

## 2017-10-11 NOTE — PT/OT/SLP PROGRESS
Physical Therapy  Treatment    Srinath Mcknight   MRN: [id number]   Admitting Diagnosis: Arthritis of left hip    PT Received On: 10/11/17  PT Start Time: 1025     PT Stop Time: 1050    PT Total Time (min): 25 min       Billable Minutes:  Gait [id number] and Therapeutic Exercise 10    Treatment Type: Treatment  PT/PTA: PT       General Precautions: Standard, fall  Orthopedic Precautions: LLE weight bearing as tolerated   Braces: N/A    Subjective:  Communicated with NURSE GASTON prior to session.  Pain/Comfort  Pain Rating 1: 9/10  Location - Side 1: Left  Location - Orientation 1: generalized  Location 1: hip    Objective:   Patient found with: peripheral IV    Functional Mobility:  Bed Mobility:   Rolling/Turning to Left: Stand by assistance  Rolling/Turning Right: Stand by assistance  Scooting/Bridging: Stand by Assistance  Supine to Sit: Stand by Assistance    Transfers:  Sit <> Stand Assistance: Stand By Assistance  Sit <> Stand Assistive Device: Rolling Walker  Bed <> Chair Technique: Stand Pivot  Bed <> Chair Assistance: Stand By Assistance  Bed <> Chair Assistive Device: Rolling Walker  Toilet Transfer Assistance: Modified Independent (PT USED URINAL IN SITTING  WITH SET UP)    Gait:   Gait Distance: PT AMB 90' X 2 TRIALS WITH RW AND SBA, SLOW PACED GAIT, CUES TO CLEAR LLE  Assistance 1: Stand by Assistance  Gait Assistive Device: Rolling walker  Gait Pattern: swing-through gait  Gait Deviation(s): decreased osmar, decreased step length, decreased toe-to-floor clearance, decreased weight-shifting ability    Balance:   Static Sit: GOOD  Dynamic Sit: FAIR  Static Stand: FAIR  Dynamic stand:  FAIR    Therapeutic Activities and Exercises:  PT EDUCATED IN AND PERFORMED BLE THEREX X 20 REPS AROM WITH REST    AM-PAC 6 CLICK MOBILITY  How much help from another person does this patient currently need?   1 = Unable, Total/Dependent Assistance  2 = A lot, Maximum/Moderate Assistance  3 = A little, Minimum/Contact  Guard/Supervision  4 = None, Modified [place]/Independent    Turning over in bed (including adjusting bedclothes, sheets and blankets)?: 4  Sitting down on and standing up from a chair with arms (e.g., wheelchair, bedside commode, etc.): 4  Moving from lying on back to sitting on the side of the bed?: 4  Moving to and from a bed to a chair (including a wheelchair)?: 4  Need to walk in hospital room?: 4  Climbing 3-5 steps with a railing?: 1  Total Score: 21    AM-PAC Raw Score CMS G-Code Modifier Level of Impairment Assistance   6 % Total / Unable   7 - 9 CM 80 - 100% Maximal Assist   10 - 14 CL 60 - 80% Moderate Assist   15 - 19 CK 40 - 60% Moderate Assist   20 - 22 CJ 20 - 40% Minimal Assist   23 CI 1-20% SBA / CGA   24 CH 0% Independent/ Mod I     Patient left up in chair with all lines intact, call button in reach and NURSE notified.    Assessment:  Srinath Mcknight is a 76 y.o. male with a medical diagnosis of Arthritis of left hip   PT WILL BENEFIT FROM CONT. SKILLED P.T. TO ADDRESS IMPAIRMENTS    Rehab identified problem list/impairments: Rehab identified problem list/impairments: impaired endurance, impaired functional mobilty, gait instability, decreased coordination, decreased safety awareness, pain    Rehab potential is good.    Activity tolerance: Good    Discharge recommendations: Discharge Facility/Level Of Care Needs: nursing facility, skilled, home health PT     Barriers to discharge: Barriers to Discharge: Decreased caregiver support    Equipment recommendations: Equipment Needed After Discharge: bath bench     GOALS:    Physical Therapy Goals        Problem: Physical Therapy Goal    Goal Priority Disciplines Outcome Goal Variances Interventions   Physical Therapy Goal     PT/OT, PT      Description:  LTG'S TO BE ME IN 7 DAYS (10-17-17)  1. PT WILL BE VALERIY WITH BED MOBILITY  2. PT WILL BE VALERIY WITH TF'S  3. PT WILL ' WITH RW VALERIY  4. PT WILL TOLERATE BLE THEREX X 20 REPS AROM  5.  PT WILL DEMO G DYNAMIC BALANCE DURING GAIT                  PLAN:    Patient to be seen 5 x/week  to address the above listed problems via gait training, therapeutic activities, therapeutic exercises  Plan of Care expires: 10/17/17  Plan of Care reviewed with: patient    PT ENCOURAGED TO CALL FOR ASSISTANCE WITH ALL NEEDS DUE TO FALL RISK STATUS, PT AGREEABLE.  PT ENCOURAGED TO INCREASE TIME OOB IN CHAIR, ALL MEALS IN CHAIR OOB, PT AGREEABLE    Sherri Little, PT  10/11/2017

## 2017-10-11 NOTE — PLAN OF CARE
Problem: Patient Care Overview  Goal: Plan of Care Review  Outcome: Ongoing (interventions implemented as appropriate)  PT C/O INCREASED PAIN TODAY, VERY SLOW MOVING, SBA FOR BED MOBILITY AND TF'S, SBA FOR GAIT USING RW 90' X 2

## 2017-10-11 NOTE — PROGRESS NOTES
SUBJECTIVE:  Patient is status post Left YAHIR.  The patient is alert and oriented ×3.  He complains of body pain.  The patient indicates that he does have a history of lumbar disease.  Patient complains of  4/10 pain that is better with the  pain meds and aggravated with movement.             Past Medical History:   Diagnosis Date    Anemia due to unknown mechanism 11/10/2015    Angina pectoris 2014     not since    Arthritis      Back pain      Coronary artery disease      Diabetes mellitus 20 years      am 09/29/2014    Diabetes mellitus type II 1994      am 12/22/2015    DM (diabetes mellitus) 1994     BS 80 am 01/10/2017    Hyperlipemia      Hypertension      CHARLES (obstructive sleep apnea)      Spinal cord disease      Trouble in sleeping      Type 2 diabetes mellitus with diabetic polyneuropathy, without long-term current use of insulin      Uses hearing aid       bilat            Past Surgical History:   Procedure Laterality Date    HERNIA REPAIR Bilateral 04/18/2017    LAMINECTOMY   07/22/2016    ROTATOR CUFF REPAIR Left 2006    SHOULDER ARTHROSCOPY W/ ROTATOR CUFF REPAIR Right 2009            Review of patient's allergies indicates:   Allergen Reactions    Sulfa (sulfonamide antibiotics)         Can't recall from 1995      No current facility-administered medications on file prior to encounter.              Current Outpatient Prescriptions on File Prior to Encounter   Medication Sig Dispense Refill    metformin (GLUCOPHAGE) 1000 MG tablet TAKE 1 TABLET TWICE DAILY WITH MEALS 180 tablet 4    acetaminophen (TYLENOL) 650 MG TbSR Take 1,300 mg by mouth every 8 (eight) hours.        blood sugar diagnostic (ACCU-CHEK FRANKO PLUS TEST STRP) Strp USE  1  STRIP ONE TIME DAILY 100 strip 4    docusate sodium (COLACE) 100 MG capsule Take 1 capsule (100 mg total) by mouth 2 (two) times daily as needed. 60 capsule 0    gabapentin (NEURONTIN) 100 MG capsule TAKE 1 CAPSULE BY MOUTH EVERY  "NIGHT AT BEDTIME FOR 1 WEEK, THEN INCREASE TO 2 CAPSULES EVERY NIGHT AT BEDTIME THEREAFTER 180 capsule 1    glipiZIDE (GLUCOTROL) 5 MG tablet TAKE 2 TABLETS (10 MG) TWO TIMES DAILY BEFORE MEALS 360 tablet 4    lancets (ACCU-CHEK MULTICLIX LANCET) Misc TEST ONE TIME DAILY 100 each 3    losartan-hydrochlorothiazide 50-12.5 mg (HYZAAR) 50-12.5 mg per tablet TAKE 1 TABLET ONE TIME DAILY 90 tablet 4    melatonin 5 mg Cap          metoprolol succinate (TOPROL-XL) 50 MG 24 hr tablet TAKE 1 TABLET EVERY DAY 90 tablet 3    oxycodone-acetaminophen (PERCOCET) 5-325 mg per tablet Take 1 tablet by mouth every 4 (four) hours as needed for Pain. 65 tablet 0    pravastatin (PRAVACHOL) 40 MG tablet TAKE 1 TABLET EVERY DAY (Patient taking differently: TAKE 1 TABLET EVERY NIGHT) 90 tablet 4    senna (SENNA CONCENTRATE) 8.6 mg tablet Take 1 tablet by mouth once daily. 60 tablet 0    sildenafil (VIAGRA) 100 MG tablet Take 1 tablet (100 mg total) by mouth as needed. Take on an empty stomach 6 tablet 11      /63 (BP Location: Left arm, Patient Position: Lying)   Pulse 76   Temp 98 °F (36.7 °C) (Oral)   Resp 18   Ht 6' 3" (1.905 m)   Wt 98.6 kg (217 lb 6 oz)   SpO2 96%   BMI 27.17 kg/m²    ROS:  GENERAL: No fever, chills, fatigability or weight loss.  SKIN: No rashes, itching or changes in color or texture of skin.  HEAD: No headaches or recent head trauma.  EYES: Visual acuity fine. No photophobia, ocular pain or diplopia.  EARS: Denies ear pain, discharge or vertigo.  NOSE: No loss of smell, no epistaxis or postnasal drip.  MOUTH & THROAT: No hoarseness or change in voice. No excessive gum bleeding.  NODES: Denies swollen glands.  CHEST: Denies DAMON, cyanosis, wheezing, cough and sputum production.  CARDIOVASCULAR: Denies chest pain, PND, orthopnea or reduced exercise tolerance.  ABDOMEN: Appetite fine. No weight loss. Denies diarrhea, abdominal pain, hematemesis or blood in stool.  URINARY: No flank pain, dysuria or " "hematuria.  PERIPHERAL VASCULAR: No claudication or cyanosis.  NEUROLOGIC: No history of seizures, paralysis, alteration of gait or coordination.  MUSCULOSKELETAL: See HPI     PE:  APPEARANCE: Well nourished, well developed, in no acute distress.   HEAD: Normocephalic, atraumatic.  EYES: PERRL. EOMI.   EARS: TM's intact. Light reflex normal. No retraction or perforation.   NOSE: Mucosa pink. Airway clear.  MOUTH & THROAT: No tonsillar enlargement. No pharyngeal erythema or exudate. No stridor.  NECK: Supple.   NODES: No cervical, axillary or inguinal lymph node enlargement.  CHEST: Lungs clear to auscultation.  CARDIOVASCULAR: Normal S1, S2. No rubs, murmurs or gallops.  ABDOMEN: Bowel sounds normal. Not distended. Soft. No tenderness or masses.  NEUROLOGIC: Cranial Nerves: II-XII grossly intact, also see MUSCULOSKELETAL  MUSCULOSKELETAL:         Left Hip   -dressing intact, 2 plus dorsalis pedis and posterior tibial artery pulses, light touch intact Left lower extremity.  All digits are warm. No erythema, no warmth, no drainage, minimum swelling, mild tenderness.  Less than 2 seconds capillary refill all digits.     /62 (BP Location: Right arm, Patient Position: Lying)   Pulse 83   Temp 100.1 °F (37.8 °C) (Oral)   Resp 18   Ht 6' 3" (1.905 m)   Wt 98.6 kg (217 lb 6 oz)   SpO2 (!) 93%   BMI 27.17 kg/m²     Intake/Output - Last 3 Shifts       10/09 0700 - 10/10 0659 10/10 0700 - 10/11 0659 10/11 0700 - 10/12 0659    P.O.  540     I.V. (mL/kg) 2450 (24.8) 1900 (19.3)     IV Piggyback  300     Total Intake(mL/kg) 2450 (24.8) 2740 (27.8)     Urine (mL/kg/hr) 975 800 (0.3)     Stool 0 0 (0)     Blood 800      Total Output 1775 800      Net +675 +1940             Stool Occurrence 0 x 0 x              Hb- pending  Hct-pending     ASSESSMENT:     The patient is stable and improving.        PLAN:  Transfer to Rehab, when accepted  Continue pain medication  Continue wound care  Continue physical therapy    "

## 2017-10-11 NOTE — PT/OT/SLP PROGRESS
Physical Therapy  Treatment    Srinath Mcknight   MRN: [id number]   Admitting Diagnosis: Arthritis of left hip    PT Received On: 10/11/17  PT Start Time: 0835     PT Stop Time: 0900    PT Total Time (min): 25 min       Billable Minutes:  Gait [id number] and Therapeutic Exercise 10    Treatment Type: Treatment  PT/PTA: PT       General Precautions: Standard, fall  Orthopedic Precautions: LLE weight bearing as tolerated   Braces: N/A    Subjective:  Communicated with NURSE GASTON prior to session.  Pain/Comfort  Pain Rating 1: 9/10  Location - Side 1: Left  Location - Orientation 1: generalized  Location 1: hip    Objective:   Patient found with: telemetry, peripheral IV    Functional Mobility:  Bed Mobility:   Rolling/Turning to Left: Stand by assistance  Rolling/Turning Right: Stand by assistance  Scooting/Bridging: Stand by Assistance  Supine to Sit: Stand by Assistance    Transfers:  Sit <> Stand Assistance: Stand By Assistance  Sit <> Stand Assistive Device: Rolling Walker  Bed <> Chair Technique: Stand Pivot  Bed <> Chair Assistance: Stand By Assistance  Bed <> Chair Assistive Device: Rolling Walker  Toilet Transfer Assistance: Modified Independent (PT USED URINAL IN SITTING  WITH SET UP)    Gait:   Gait Distance: PT AMB 50' X 2 TRIALS WITH RW AND SBA, SLOW PACED GAIT, CUES FOR CLEAR LLE, C/O INCREASED PAIN  Assistance 1: Stand by Assistance  Gait Assistive Device: Rolling walker  Gait Pattern: swing-through gait  Gait Deviation(s): decreased osmar, decreased step length, decreased toe-to-floor clearance, decreased weight-shifting ability    Balance:   Static Sit: GOOD  Dynamic Sit: FAIR  Static Stand: FAIR  Dynamic stand:  FAIR    Therapeutic Activities and Exercises:  PT EDUCATED IN AND PERFORMED BLE THEREX X 20 REPS AROM WITH REST     AM-PAC 6 CLICK MOBILITY  How much help from another person does this patient currently need?   1 = Unable, Total/Dependent Assistance  2 = A lot, Maximum/Moderate Assistance  3  = A little, Minimum/Contact Guard/Supervision  4 = None, Modified [place]/Independent    Turning over in bed (including adjusting bedclothes, sheets and blankets)?: 4  Sitting down on and standing up from a chair with arms (e.g., wheelchair, bedside commode, etc.): 4  Moving from lying on back to sitting on the side of the bed?: 4  Moving to and from a bed to a chair (including a wheelchair)?: 4  Need to walk in hospital room?: 4  Climbing 3-5 steps with a railing?: 1  Total Score: 21    AM-PAC Raw Score CMS G-Code Modifier Level of Impairment Assistance   6 % Total / Unable   7 - 9 CM 80 - 100% Maximal Assist   10 - 14 CL 60 - 80% Moderate Assist   15 - 19 CK 40 - 60% Moderate Assist   20 - 22 CJ 20 - 40% Minimal Assist   23 CI 1-20% SBA / CGA   24 CH 0% Independent/ Mod I     Patient left up in chair with all lines intact, call button in reach and NURSE notified.    Assessment:  Srinath Mcknight is a 76 y.o. male with a medical diagnosis of Arthritis of left hip   PT WILL BENEFIT FROM CONT. SKILLED P.T. TO ADDRESS IMPAIRMENTS    Rehab identified problem list/impairments: Rehab identified problem list/impairments: impaired endurance, impaired functional mobilty, impaired balance, decreased coordination, decreased safety awareness, pain    Rehab potential is good.    Activity tolerance: Good    Discharge recommendations: Discharge Facility/Level Of Care Needs: nursing facility, skilled, home health PT     Barriers to discharge: Barriers to Discharge: Decreased caregiver support    Equipment recommendations: Equipment Needed After Discharge: bath bench     GOALS:    Physical Therapy Goals        Problem: Physical Therapy Goal    Goal Priority Disciplines Outcome Goal Variances Interventions   Physical Therapy Goal     PT/OT, PT      Description:  LTG'S TO BE ME IN 7 DAYS (10-17-17)  1. PT WILL BE VALERIY WITH BED MOBILITY  2. PT WILL BE VALERIY WITH TF'S  3. PT WILL ' WITH RW VALERIY  4. PT WILL TOLERATE  BLE THEREX X 20 REPS AROM  5. PT WILL DEMO G DYNAMIC BALANCE DURING GAIT                  PLAN:    Patient to be seen 5 x/week  to address the above listed problems via gait training, therapeutic activities, therapeutic exercises  Plan of Care expires: 10/17/17  Plan of Care reviewed with: patient    PT ENCOURAGED TO CALL FOR ASSISTANCE WITH ALL NEEDS DUE TO FALL RISK STATUS, PT AGREEABLE.  PT ENCOURAGED TO INCREASE TIME OOB IN CHAIR, ALL MEALS IN CHAIR OOB, PT AGREEABLE    Sherri Little, PT  10/11/2017

## 2017-10-11 NOTE — PLAN OF CARE
Problem: Occupational Therapy Goal  Goal: Occupational Therapy Goal  OT GOALS TO BE MET BY 10-24-17  1 S WITH UE DRESSING  2. S WITH LE DRESSING  3. PT WILL TOLERATE 2 SET X 20 REPS B UE ROM EXERCISE WITH MIN RESISTANCE   Outcome: Ongoing (interventions implemented as appropriate)  Continues to work toward b ue strength/endurance

## 2017-10-11 NOTE — PLAN OF CARE
Call received from Monisha Jackson at [facility]. She stated that they have a bed avialble, have accepted the patient but that [employer] will not authorize skilled placement until his h/h is stable. They recommend transfusion, repeat H/H. If h/h is stable they will give the auth to [facility].. Update to Jordan Alonso NP.      @ 1440 Per primary nurse patient has not received his PRBC . Primary nurse is working on it. Patient will need a repeat h/h prior to [facility] releasing the skilled auth.      @ 1600 Patient has not received his transfusion . Will need repeat h/h after transfusion prior to obtaining [employer] auth. Unable to obtain the auth today . [facility] closes at 5pm . Patient to discharge tomorrow if stable to [facility].

## 2017-10-11 NOTE — PT/OT/SLP PROGRESS
Occupational Therapy      Srinath Mcknight  MRN: [id number]    S: pt cooperative with therapy session  O: pt performed 2 set x 10 reps b ue rom exercise with 2.5 lb dowel in all available planes and ranges seated in bedside chair.  A: pt displays improvements with b ue strength/endurance as evidence by completing hep  P: continue with POC  Nisha Singletary, OT   [phone]  1 gutierrez  10/11/2017

## 2017-10-11 NOTE — PT/OT/SLP PROGRESS
Physical Therapy  Treatment    Srinath Mcknight   MRN: [id number]   Admitting Diagnosis: Arthritis of left hip    PT Received On: 10/11/17  PT Start Time: 0745     PT Stop Time: 0755    PT Total Time (min): 10 min       Billable Minutes:  Therapeutic Activity 10    Treatment Type: Treatment  PT/PTA: PT       General Precautions: Standard, fall  Orthopedic Precautions: LLE weight bearing as tolerated   Braces: N/A    Subjective:  Communicated with NURSE GASTON prior to session.  Pain/Comfort  Pain Rating 1: 9/10  Location - Side 1: Left  Location - Orientation 1: generalized  Location 1: hip    Objective:   Patient found with: telemetry, peripheral IV    Functional Mobility:  Bed Mobility:   Rolling/Turning to Left: Stand by assistance  Rolling/Turning Right: Stand by assistance  Scooting/Bridging: Stand by Assistance  Supine to Sit: Stand by Assistance    Transfers:  Sit <> Stand Assistance: Stand By Assistance  Sit <> Stand Assistive Device: Rolling Walker    Gait:     Balance:   Static Sit: GOOD  Dynamic Sit: FAIR  Static Stand: FAIR  Dynamic stand:  FAIR    Therapeutic Activities and Exercises:  PT SAT EOB WITH SBA, SLOW MOVING DUE TO C/O INCREASED PAIN, PT REQUIRED JULIETA FOR DONNING SHOES WITH EXTRA TIME, LIMITED TX. DUE TO NURSE WANTING TO GIVE MEDS    AM-PAC 6 CLICK MOBILITY  How much help from another person does this patient currently need?   1 = Unable, Total/Dependent Assistance  2 = A lot, Maximum/Moderate Assistance  3 = A little, Minimum/Contact Guard/Supervision  4 = None, Modified [place]/Independent    Turning over in bed (including adjusting bedclothes, sheets and blankets)?: 4  Sitting down on and standing up from a chair with arms (e.g., wheelchair, bedside commode, etc.): 4  Moving from lying on back to sitting on the side of the bed?: 4  Moving to and from a bed to a chair (including a wheelchair)?: 4  Need to walk in hospital room?: 4  Climbing 3-5 steps with a railing?: 1  Total Score:  21    AM-PAC Raw Score CMS G-Code Modifier Level of Impairment Assistance   6 % Total / Unable   7 - 9 CM 80 - 100% Maximal Assist   10 - 14 CL 60 - 80% Moderate Assist   15 - 19 CK 40 - 60% Moderate Assist   20 - 22 CJ 20 - 40% Minimal Assist   23 CI 1-20% SBA / CGA   24 CH 0% Independent/ Mod I     Patient left SEATED AT EOB with all lines intact, call button in reach and NURSE notified.    Assessment:  Srinath Mcknight is a 76 y.o. male with a medical diagnosis of Arthritis of left hip   PT WILL BENEFIT FROM CONT. SKILLED P.T. TO ADDRESS IMPAIRMENTS    Rehab identified problem list/impairments: Rehab identified problem list/impairments: impaired functional mobilty, impaired endurance, impaired balance, decreased safety awareness, pain    Rehab potential is good.    Activity tolerance: Good    Discharge recommendations: Discharge Facility/Level Of Care Needs: nursing facility, skilled, home health PT     Barriers to discharge: Barriers to Discharge: Decreased caregiver support    Equipment recommendations: Equipment Needed After Discharge: bath bench     GOALS:    Physical Therapy Goals        Problem: Physical Therapy Goal    Goal Priority Disciplines Outcome Goal Variances Interventions   Physical Therapy Goal     PT/OT, PT      Description:  LTG'S TO BE ME IN 7 DAYS (10-17-17)  1. PT WILL BE VALERIY WITH BED MOBILITY  2. PT WILL BE VALERIY WITH TF'S  3. PT WILL ' WITH RW VALERIY  4. PT WILL TOLERATE BLE THEREX X 20 REPS AROM  5. PT WILL DEMO G DYNAMIC BALANCE DURING GAIT                  PLAN:    Patient to be seen 5 x/week  to address the above listed problems via gait training, therapeutic activities, therapeutic exercises  Plan of Care expires: 10/17/17  Plan of Care reviewed with: patient    PT ENCOURAGED TO CALL FOR ASSISTANCE WITH ALL NEEDS DUE TO FALL RISK STATUS, PT AGREEABLE.  PT ENCOURAGED TO INCREASE TIME OOB IN CHAIR, ALL MEALS IN CHAIR OOB, PT AGREEABLE    Sherri Little, PT  10/11/2017

## 2017-10-12 VITALS
OXYGEN SATURATION: 99 % | RESPIRATION RATE: 18 BRPM | TEMPERATURE: 99 F | SYSTOLIC BLOOD PRESSURE: 146 MMHG | WEIGHT: 217.38 LBS | DIASTOLIC BLOOD PRESSURE: 69 MMHG | BODY MASS INDEX: 27.03 KG/M2 | HEART RATE: 80 BPM | HEIGHT: 75 IN

## 2017-10-12 LAB
HCT VFR BLD AUTO: 25.1 %
HGB BLD-MCNC: 8.7 G/DL
POCT GLUCOSE: 167 MG/DL (ref 70–110)
POCT GLUCOSE: 180 MG/DL (ref 70–110)
POCT GLUCOSE: 205 MG/DL (ref 70–110)
POCT GLUCOSE: 224 MG/DL (ref 70–110)

## 2017-10-12 PROCEDURE — 25000003 PHARM REV CODE 250: Performed by: ORTHOPAEDIC SURGERY

## 2017-10-12 PROCEDURE — 85018 HEMOGLOBIN: CPT

## 2017-10-12 PROCEDURE — 97530 THERAPEUTIC ACTIVITIES: CPT

## 2017-10-12 PROCEDURE — 90662 IIV NO PRSV INCREASED AG IM: CPT | Performed by: ORTHOPAEDIC SURGERY

## 2017-10-12 PROCEDURE — 97116 GAIT TRAINING THERAPY: CPT

## 2017-10-12 PROCEDURE — 3E0234Z INTRODUCTION OF SERUM, TOXOID AND VACCINE INTO MUSCLE, PERCUTANEOUS APPROACH: ICD-10-PCS | Performed by: ORTHOPAEDIC SURGERY

## 2017-10-12 PROCEDURE — 85014 HEMATOCRIT: CPT

## 2017-10-12 PROCEDURE — 36415 COLL VENOUS BLD VENIPUNCTURE: CPT

## 2017-10-12 PROCEDURE — 97110 THERAPEUTIC EXERCISES: CPT

## 2017-10-12 PROCEDURE — 25000003 PHARM REV CODE 250: Performed by: NURSE PRACTITIONER

## 2017-10-12 PROCEDURE — 63600175 PHARM REV CODE 636 W HCPCS: Performed by: ORTHOPAEDIC SURGERY

## 2017-10-12 PROCEDURE — G0008 ADMIN INFLUENZA VIRUS VAC: HCPCS | Performed by: ORTHOPAEDIC SURGERY

## 2017-10-12 PROCEDURE — 90471 IMMUNIZATION ADMIN: CPT | Performed by: ORTHOPAEDIC SURGERY

## 2017-10-12 PROCEDURE — 96372 THER/PROPH/DIAG INJ SC/IM: CPT

## 2017-10-12 RX ADMIN — OXYCODONE HYDROCHLORIDE 10 MG: 5 TABLET ORAL at 11:10

## 2017-10-12 RX ADMIN — APIXABAN 2.5 MG: 2.5 TABLET, FILM COATED ORAL at 09:10

## 2017-10-12 RX ADMIN — OXYCODONE HYDROCHLORIDE 10 MG: 5 TABLET ORAL at 06:10

## 2017-10-12 RX ADMIN — THERA TABS 1 TABLET: TAB at 09:10

## 2017-10-12 RX ADMIN — POLYETHYLENE GLYCOL 3350 17 G: 17 POWDER, FOR SOLUTION ORAL at 09:10

## 2017-10-12 RX ADMIN — METOPROLOL SUCCINATE 50 MG: 50 TABLET, EXTENDED RELEASE ORAL at 09:10

## 2017-10-12 RX ADMIN — DOCUSATE SODIUM 100 MG: 100 CAPSULE, LIQUID FILLED ORAL at 09:10

## 2017-10-12 RX ADMIN — INSULIN ASPART 4 UNITS: 100 INJECTION, SOLUTION INTRAVENOUS; SUBCUTANEOUS at 06:10

## 2017-10-12 RX ADMIN — INFLUENZA A VIRUSA/MICHIGAN/45/2015 X-275 (H1N1) ANTIGEN (FORMALDEHYDE INACTIVATED), INFLUENZA A VIRUS A/HONG KONG/4801/2014 X-263B (H3N2) ANTIGEN (FORMALDEHYDE INACTIVATED), AND INFLUENZA B VIRUS B/BRISBANE/60/2008 ANTIGEN (FORMALDEHYDE INACTIVATED) 0.5 ML: 60; 60; 60 INJECTION, SUSPENSION INTRAMUSCULAR at 12:10

## 2017-10-12 NOTE — PT/OT/SLP PROGRESS
Occupational Therapy  Treatment    Srinath Mcknight   MRN: [id number]   Admitting Diagnosis: Arthritis of left hip    OT Date of Treatment: 10/12/17   OT Start Time: 1035  OT Stop Time: 1059  OT Total Time (min): 24 min    Billable Minutes:  Therapeutic Activity 24 MINUTES    General Precautions: Standard, fall  Orthopedic Precautions: LLE weight bearing as tolerated  Braces: N/A         Subjective:  Communicated with NURSE SELINA AND EPIC CHART REVIEW prior to session.    Pain/Comfort  Pain Rating 1: 4/10  Location - Side 1: Left  Location - Orientation 1: lower  Location 1: leg  Pain Addressed 1: Reposition, Pre-medicate for activity  Pain Rating Post-Intervention 1: 4/10    Objective:  Patient found with: peripheral IV     Functional Mobility:  Bed Mobility:       Transfers:   Sit <> Stand Assistance: Minimum Assistance  Sit <> Stand Assistive Device: Rolling Walker    Functional Ambulation: PT AMBULATED 5 STEPS X 2. PT FUNCTIONAL MOBILITY LIMITED DUE TO INCREASE 10.10 PAIN IN LEFT ELBOW AND LE    Activities of Daily Living:     Feeding adaptive equipment: NA  UE Dressing Level of Assistance: Minimum assistance  UE adaptive equipment: NA     LE adaptive equipment: NA                    Bathing adaptive equipment: NA    Balance:   Static Sit: GOOD: Takes MODERATE challenges from all directions SUPPORTED CHAIR  Dynamic Sit: GOOD: Maintains balance through MODERATE excursions of active trunk movement  Static Stand: FAIR+: Takes MINIMAL challenges from all directions  Dynamic stand: POOR: N/A    Therapeutic Activities and Exercises:  PT SEEN IN ROOM. PT PERFORMED 2 SET X 10 REPS B UE EXERCISE SEATED IN BEDSIDE CHAIR    AM-PAC 6 CLICK ADL   How much help from another person does this patient currently need?   1 = Unable, Total/Dependent Assistance  2 = A lot, Maximum/Moderate Assistance  3 = A little, Minimum/Contact Guard/Supervision  4 = None, Modified [place]/Independent    Putting on and taking off regular lower  "body clothing? : 2  Bathing (including washing, rinsing, drying)?: 2  Toileting, which includes using toilet, bedpan, or urinal? : 2  Putting on and taking off regular upper body clothing?: 3  Taking care of personal grooming such as brushing teeth?: 3  Eating meals?: 3  Total Score: 15     AM-PAC Raw Score CMS "G-Code Modifier Level of Impairment Assistance   6 % Total / Unable   7 - 8 CM 80 - 100% Maximal Assist   9-13 CL 60 - 80% Moderate Assist   14 - 19 CK 40 - 60% Moderate Assist   20 - 22 CJ 20 - 40% Minimal Assist   23 CI 1-20% SBA / CGA   24 CH 0% Independent/ Mod I       Patient left up in chair with all lines intact, call button in reach, NURSE notified and FRIEND/ SIGNIFICIANT OTHER present    ASSESSMENT:  Srinath Mcknight is a 76 y.o. male with a medical diagnosis of Arthritis of left hip and presents with IMPAIRED ADL'S, DECREASE B UE STRENGTH/ENDURANCE, INCREASE L EFT UE PAIN, IMPAIRED FUNCTIONAL MOBILITY, IMPAIRED T/F'S.    Rehab identified problem list/impairments: Rehab identified problem list/impairments: weakness, impaired self care skills, impaired balance, impaired endurance, impaired functional mobilty, gait instability    Rehab potential is good.    Activity tolerance: Good    Discharge recommendations: Discharge Facility/Level Of Care Needs: nursing facility, skilled     Barriers to discharge: Barriers to Discharge: Decreased caregiver support    Equipment recommendations: bath bench     GOALS:    Occupational Therapy Goals        Problem: Occupational Therapy Goal    Goal Priority Disciplines Outcome Interventions   Occupational Therapy Goal     OT, PT/OT Ongoing (interventions implemented as appropriate)    Description:  OT GOALS TO BE MET BY 10-24-17  1 S WITH UE DRESSING  2. S WITH LE DRESSING  3. PT WILL TOLERATE 2 SET X 20 REPS B UE ROM EXERCISE WITH MIN RESISTANCE                    Plan:  Patient to be seen 3 x/week to address the above listed problems via self-care/home " management, therapeutic activities, therapeutic exercises  Plan of Care expires:    Plan of Care reviewed with: patient, significant other         Nisha Singletary OT  10/12/2017

## 2017-10-12 NOTE — PLAN OF CARE
Problem: Patient Care Overview  Goal: Plan of Care Review  Outcome: Ongoing (interventions implemented as appropriate)  Family at bedside. Pt ambulates with walker in room with one assist. BLE elevated with pillows. Dressing to right hip clean, dry and intact. VSS.  Fall precautions in place. Side rails up. Bed locked and low in position. Call light and personal items within reach. 24 hour order chart check completed. Pt educated on medication's side effects. Pt verbalized understanding.   Will continue to monitor.

## 2017-10-12 NOTE — PLAN OF CARE
10/12/17 1157   Medicare Message   Important Message from Medicare regarding Discharge Appeal Rights Given to patient/caregiver;Explained to patient/caregiver;Signed/date by patient/caregiver   Date IMM was signed 10/12/17   Time IMM was signed 1812

## 2017-10-12 NOTE — PLAN OF CARE
Problem: Patient Care Overview  Goal: Plan of Care Review  Outcome: Ongoing (interventions implemented as appropriate)  PATIENT RECEPTIVE TO TX HOWEVER MUCH LIMITED DUE TO CURRENT PAIN LEVEL. PATIENT FAMILY PRESENT , SUPPORTIVE WITH PATIENT CARE.

## 2017-10-12 NOTE — PLAN OF CARE
Spoke with Guillermina Monterroso Admissions Coordinator at [facility]. They have received discharge paperwork. Transportation has been arranged for 1630 # for report is [phone]. Update to Hazel schwartz nurse.

## 2017-10-12 NOTE — PT/OT/SLP PROGRESS
Physical Therapy  Treatment    Srinath Mcknight   MRN: [id number]   Admitting Diagnosis: Arthritis of left hip    PT Received On: 10/12/17  PT Start Time: 1150     PT Stop Time: 1200    PT Total Time (min): 10 min       Billable Minutes:  Therapeutic Exercise 10    Treatment Type: Treatment  PT/PTA: PTA     PTA Visit Number: 2       General Precautions: Standard, fall  Orthopedic Precautions: LLE weight bearing as tolerated   Braces: N/A         Subjective:  Communicated with NURSESELINA   prior to session.      Pain/Comfort  Pain Rating 1: 4/10  Location - Side 1: Left  Location - Orientation 1: lower  Location 1: leg  Pain Addressed 1: Reposition, Pre-medicate for activity  Pain Rating Post-Intervention 1: 4/10  Pain Rating 2: 4/10  Location - Side 2: Left  Location - Orientation 2: upper  Location 2: arm    Objective:   Patient found with: peripheral IV    Functional Mobility:  Bed Mobility:        Transfers:  Sit <> Stand Assistance: Contact Guard Assistance  Sit <> Stand Assistive Device: Rolling Walker  Bed <> Chair Assistive Device: Rolling Walker    Gait:   Gait Distance: PATIENT LIMTED AT PRESENT TIME FOR GT DUE TO INCREASE PAIN LEVEL.  Gait Pattern: swing-through gait  Gait Deviation(s): decreased osmar, decreased step length, increased stride width, decreased stride length    Stairs:  N/A    Balance:   Static Sit: POOR: Needs MODERATE assist to maintain  Dynamic Sit: GOOD-: Maintains balance through MODERATE excursions of active trunk movement,     Static Stand: POOR+: Needs MINIMAL assist to maintain  Dynamic stand: POOR: N/A     Therapeutic Activities and Exercises:  SHORT SEATED THERAPUETIC EXERCISES WITH VISUAL AND VERBAL CUES TO FOLLOW THROUGH.    AM-PAC 6 CLICK MOBILITY  How much help from another person does this patient currently need?   1 = Unable, Total/Dependent Assistance  2 = A lot, Maximum/Moderate Assistance  3 = A little, Minimum/Contact Guard/Supervision  4 = None, Modified  [place]/Independent    Turning over in bed (including adjusting bedclothes, sheets and blankets)?: 2  Sitting down on and standing up from a chair with arms (e.g., wheelchair, bedside commode, etc.): 2  Moving from lying on back to sitting on the side of the bed?: 2  Moving to and from a bed to a chair (including a wheelchair)?: 2  Need to walk in hospital room?: 2  Climbing 3-5 steps with a railing?: 1  Total Score: 11    AM-PAC Raw Score CMS G-Code Modifier Level of Impairment Assistance   6 % Total / Unable   7 - 9 CM 80 - 100% Maximal Assist   10 - 14 CL 60 - 80% Moderate Assist   15 - 19 CK 40 - 60% Moderate Assist   20 - 22 CJ 20 - 40% Minimal Assist   23 CI 1-20% SBA / CGA   24 CH 0% Independent/ Mod I     Patient left up in chair with all lines intact and call button in reach.    Assessment  Srinath Mcknight is a 76 y.o. male with a medical diagnosis of Arthritis of left hip .    Rehab identified problem list/impairments: Rehab identified problem list/impairments: impaired endurance, impaired self care skills, impaired functional mobilty, gait instability, decreased lower extremity function, decreased upper extremity function    Rehab potential is good.    Activity tolerance: Good    Discharge recommendations: Discharge Facility/Level Of Care Needs: home health PT, nursing facility, skilled     Barriers to discharge: Barriers to Discharge: Decreased caregiver support    Equipment recommendations: Equipment Needed After Discharge: bath bench     GOALS:    Physical Therapy Goals        Problem: Physical Therapy Goal    Goal Priority Disciplines Outcome Goal Variances Interventions   Physical Therapy Goal     PT/OT, PT      Description:  LTG'S TO BE ME IN 7 DAYS (10-17-17)  1. PT WILL BE VALERIY WITH BED MOBILITY  2. PT WILL BE VALERIY WITH TF'S  3. PT WILL ' WITH RW VALERIY  4. PT WILL TOLERATE BLE THEREX X 20 REPS AROM  5. PT WILL DEMO G DYNAMIC BALANCE DURING GAIT                    PLAN:     Patient to be seen 5 x/week  to address the above listed problems via gait training, therapeutic exercises, therapeutic activities  Plan of Care expires: 10/17/17  Plan of Care reviewed with: patient         Rosita Pascual, PTA  10/12/2017

## 2017-10-12 NOTE — PROGRESS NOTES
SUBJECTIVE:  Patient is status post Left YAHIR.  The patient is alert and oriented ×3.  He complains of body pain.  The patient indicates that he does have a history of lumbar disease.  Patient complains of  4/10 pain that is better with the  pain meds and aggravated with movement.                Past Medical History:   Diagnosis Date    Anemia due to unknown mechanism 11/10/2015    Angina pectoris 2014     not since    Arthritis      Back pain      Coronary artery disease      Diabetes mellitus 20 years      am 09/29/2014    Diabetes mellitus type II 1994      am 12/22/2015    DM (diabetes mellitus) 1994     BS 80 am 01/10/2017    Hyperlipemia      Hypertension      CHARLES (obstructive sleep apnea)      Spinal cord disease      Trouble in sleeping      Type 2 diabetes mellitus with diabetic polyneuropathy, without long-term current use of insulin      Uses hearing aid       bilat                Past Surgical History:   Procedure Laterality Date    HERNIA REPAIR Bilateral 04/18/2017    LAMINECTOMY   07/22/2016    ROTATOR CUFF REPAIR Left 2006    SHOULDER ARTHROSCOPY W/ ROTATOR CUFF REPAIR Right 2009                Review of patient's allergies indicates:   Allergen Reactions    Sulfa (sulfonamide antibiotics)         Can't recall from 1995      No current facility-administered medications on file prior to encounter.                   Current Outpatient Prescriptions on File Prior to Encounter   Medication Sig Dispense Refill    metformin (GLUCOPHAGE) 1000 MG tablet TAKE 1 TABLET TWICE DAILY WITH MEALS 180 tablet 4    acetaminophen (TYLENOL) 650 MG TbSR Take 1,300 mg by mouth every 8 (eight) hours.        blood sugar diagnostic (ACCU-CHEK FRANKO PLUS TEST STRP) Strp USE  1  STRIP ONE TIME DAILY 100 strip 4    docusate sodium (COLACE) 100 MG capsule Take 1 capsule (100 mg total) by mouth 2 (two) times daily as needed. 60 capsule 0    gabapentin (NEURONTIN) 100 MG capsule TAKE 1 CAPSULE  "BY MOUTH EVERY NIGHT AT BEDTIME FOR 1 WEEK, THEN INCREASE TO 2 CAPSULES EVERY NIGHT AT BEDTIME THEREAFTER 180 capsule 1    glipiZIDE (GLUCOTROL) 5 MG tablet TAKE 2 TABLETS (10 MG) TWO TIMES DAILY BEFORE MEALS 360 tablet 4    lancets (ACCU-CHEK MULTICLIX LANCET) Misc TEST ONE TIME DAILY 100 each 3    losartan-hydrochlorothiazide 50-12.5 mg (HYZAAR) 50-12.5 mg per tablet TAKE 1 TABLET ONE TIME DAILY 90 tablet 4    melatonin 5 mg Cap          metoprolol succinate (TOPROL-XL) 50 MG 24 hr tablet TAKE 1 TABLET EVERY DAY 90 tablet 3    oxycodone-acetaminophen (PERCOCET) 5-325 mg per tablet Take 1 tablet by mouth every 4 (four) hours as needed for Pain. 65 tablet 0    pravastatin (PRAVACHOL) 40 MG tablet TAKE 1 TABLET EVERY DAY (Patient taking differently: TAKE 1 TABLET EVERY NIGHT) 90 tablet 4    senna (SENNA CONCENTRATE) 8.6 mg tablet Take 1 tablet by mouth once daily. 60 tablet 0    sildenafil (VIAGRA) 100 MG tablet Take 1 tablet (100 mg total) by mouth as needed. Take on an empty stomach 6 tablet 11      /63 (BP Location: Left arm, Patient Position: Lying)   Pulse 76   Temp 98 °F (36.7 °C) (Oral)   Resp 18   Ht 6' 3" (1.905 m)   Wt 98.6 kg (217 lb 6 oz)   SpO2 96%   BMI 27.17 kg/m²    ROS:  GENERAL: No fever, chills, fatigability or weight loss.  SKIN: No rashes, itching or changes in color or texture of skin.  HEAD: No headaches or recent head trauma.  EYES: Visual acuity fine. No photophobia, ocular pain or diplopia.  EARS: Denies ear pain, discharge or vertigo.  NOSE: No loss of smell, no epistaxis or postnasal drip.  MOUTH & THROAT: No hoarseness or change in voice. No excessive gum bleeding.  NODES: Denies swollen glands.  CHEST: Denies DAMON, cyanosis, wheezing, cough and sputum production.  CARDIOVASCULAR: Denies chest pain, PND, orthopnea or reduced exercise tolerance.  ABDOMEN: Appetite fine. No weight loss. Denies diarrhea, abdominal pain, hematemesis or blood in stool.  URINARY: No flank " "pain, dysuria or hematuria.  PERIPHERAL VASCULAR: No claudication or cyanosis.  NEUROLOGIC: No history of seizures, paralysis, alteration of gait or coordination.  MUSCULOSKELETAL: See HPI     PE:  APPEARANCE: Well nourished, well developed, in no acute distress.   HEAD: Normocephalic, atraumatic.  EYES: PERRL. EOMI.   EARS: TM's intact. Light reflex normal. No retraction or perforation.   NOSE: Mucosa pink. Airway clear.  MOUTH & THROAT: No tonsillar enlargement. No pharyngeal erythema or exudate. No stridor.  NECK: Supple.   NODES: No cervical, axillary or inguinal lymph node enlargement.  CHEST: Lungs clear to auscultation.  CARDIOVASCULAR: Normal S1, S2. No rubs, murmurs or gallops.  ABDOMEN: Bowel sounds normal. Not distended. Soft. No tenderness or masses.  NEUROLOGIC: Cranial Nerves: II-XII grossly intact, also see MUSCULOSKELETAL  MUSCULOSKELETAL:         Left Hip   -dressing intact, 2 plus dorsalis pedis and posterior tibial artery pulses, light touch intact Left lower extremity.  All digits are warm. No erythema, no warmth, no drainage, minimum swelling, mild tenderness.  Less than 2 seconds capillary refill all digits.     Intake/Output - Last 3 Shifts       10/10 0700 - 10/11 0659 10/11 0700 - 10/12 0659 10/12 0700 - 10/13 0659    P.O. 540 780 740    I.V. (mL/kg) 2696.3 (27.3) 1650 (16.7)     Blood  340     IV Piggyback 300      Total Intake(mL/kg) 3536.3 (35.9) 2770 (28.1) 740 (7.5)    Urine (mL/kg/hr) 800 (0.3) 1650 (0.7) 400 (0.5)    Stool 0 (0)      Blood       Total Output 800 1650 400    Net +2736.3 +1120 +340           Urine Occurrence  1 x     Stool Occurrence 0 x            /60 (BP Location: Right arm, Patient Position: Sitting)   Pulse 91   Temp 98.2 °F (36.8 °C)   Resp 18   Ht 6' 3" (1.905 m)   Wt 98.6 kg (217 lb 6 oz)   SpO2 98%   BMI 27.17 kg/m²           Hb-8.7   Hct-25.1     ASSESSMENT:     The patient is stable and improving.        PLAN:  Transfer to SNF   Continue pain " medication  Continue wound care  Continue physical therapy

## 2017-10-12 NOTE — PLAN OF CARE
Problem: Patient Care Overview  Goal: Plan of Care Review  Outcome: Ongoing (interventions implemented as appropriate)  Patient remained free from injury. Sitting in chair. Received 1 unit of blood. Family at bedside. Ambulated. C/o hip discomfort. Call light in reach. Safety measures in place. Will continue to monitor. Reviewed plan of care. Patient verbalized understanding and teach back.    12hr chart check complete.

## 2017-10-12 NOTE — PT/OT/SLP PROGRESS
Physical Therapy  Treatment    Srinath Mcknight   MRN: [id number]   Admitting Diagnosis: Arthritis of left hip    PT Received On: 10/12/17  PT Start Time: 0930     PT Stop Time: 0955    PT Total Time (min): 25 min       Billable Minutes:  Gait [id number] and Therapeutic Exercise 10       PT/PTA: PTA     PTA Visit Number: 1       General Precautions: Standard, fall  Orthopedic Precautions: LLE weight bearing as tolerated   Braces:           Subjective:  Communicated with NURSE, SELINA shelby to session.      Pain/Comfort  Pain Rating 1: 4/10  Location - Side 1: Left  Pain Addressed 1: Pre-medicate for activity    Objective:   Patient found with: peripheral IV    Functional Mobility:  Bed Mobility:        Transfers:  Sit <> Stand Assistance: Contact Guard Assistance  Sit <> Stand Assistive Device: Rolling Walker  Bed <> Chair Assistive Device: Rolling Walker    Gait:   Gait Pattern: swing-through gait  Gait Deviation(s): decreased osmar, decreased step length, increased stride width, decreased stride length    Stairs:  N/A    Balance:   Static Sit: FAIR+: Able to take MINIMAL challenges from all directions  Dynamic Sit: FAIR: Cannot move trunk without losing balance  Static Stand: FAIR: Maintains without assist but unable to take challenges  Dynamic stand: FAIR: Needs CONTACT GUARD during gait     Therapeutic Activities and Exercises:  INSTRUCT WITH THERAPUETIC EXERCISES ALLI LE SHORT SEATED X10 REPS, GT WITH RW AT CGA , VERY SLOWED MOVEMENT DUE TO PAIN LEVEL.    AM-PAC 6 CLICK MOBILITY  How much help from another person does this patient currently need?   1 = Unable, Total/Dependent Assistance  2 = A lot, Maximum/Moderate Assistance  3 = A little, Minimum/Contact Guard/Supervision  4 = None, Modified [place]/Independent         AM-PAC Raw Score CMS G-Code Modifier Level of Impairment Assistance   6 % Total / Unable   7 - 9 CM 80 - 100% Maximal Assist   10 - 14 CL 60 - 80% Moderate Assist   15 - 19 CK 40 - 60%  Moderate Assist   20 - 22 CJ 20 - 40% Minimal Assist   23 CI 1-20% SBA / CGA   24 CH 0% Independent/ Mod I     Patient left up in chair with all lines intact and call button in reach.    Assessment:  Srinath Mcknight is a 76 y.o. male with a medical diagnosis of Arthritis of left hip.    Rehab identified problem list/impairments: Rehab identified problem list/impairments: impaired endurance, gait instability, impaired functional mobilty, impaired self care skills, impaired balance, decreased upper extremity function, decreased lower extremity function, pain, edema    Rehab potential is good.    Activity tolerance: Good    Discharge recommendations: Discharge Facility/Level Of Care Needs: nursing facility, skilled, home health PT     Barriers to discharge: Barriers to Discharge: Decreased caregiver support    Equipment recommendations: Equipment Needed After Discharge: bath bench     GOALS:    Physical Therapy Goals        Problem: Physical Therapy Goal    Goal Priority Disciplines Outcome Goal Variances Interventions   Physical Therapy Goal     PT/OT, PT      Description:  LTG'S TO BE ME IN 7 DAYS (10-17-17)  1. PT WILL BE VALERIY WITH BED MOBILITY  2. PT WILL BE VALERIY WITH TF'S  3. PT WILL ' WITH RW VALERIY  4. PT WILL TOLERATE BLE THEREX X 20 REPS AROM  5. PT WILL DEMO G DYNAMIC BALANCE DURING GAIT                    PLAN:    Patient to be seen 5 x/week  to address the above listed problems via gait training, therapeutic activities, therapeutic exercises  Plan of Care expires: 10/17/17  Plan of Care reviewed with: patient, spouse         Rosita Pascual, PTA  10/12/2017

## 2017-10-12 NOTE — PLAN OF CARE
Call received from Katelin MUNOZ CM at [facility], she stated that she just released the authorization for skilled care to [facility]. Message sent to Dr Alcocer via secure chat. Update to Hazel schwartz nurse.      @ 1200 discharge orders noted, faxed to [facility], awaiting [place] to call with transportation arrangements.

## 2017-10-13 NOTE — PLAN OF CARE
10/13/17 0909   Final Note   Assessment Type Final Discharge Note   Discharge Disposition SNF  ([employer])

## 2017-10-13 NOTE — DISCHARGE SUMMARY
[facility] -   Orthopedics  Discharge Summary      Patient Name: Srinath Mcknight  MRN: [id number]  Admission Date: 10/9/2017  Hospital Length of Stay: 3 days  Discharge Date and Time:  October 12, 2017  Attending Physician: No att. providers found   Discharging Provider: Marlo Bautista Sr, MD  Primary Care Provider: Yeison Wilder MD    HPI: The patient admitted and underwent a left total hip replacement.    Procedure(s) (LRB):  ARTHROPLASTY-HIP (ANTERIOR APPROACH) (Left)      Hospital Course: The patient admitted to the hospital and underwent a left total hip replacement.  Postoperatively the patient developed blood loss related anemia.  The patient underwent physical therapy and occupational therapy without difficulty.  On discharge the patient is grossly neurovascularly intact and the incision is dry.  The patient is being discharged to an inpatient rehabilitation facility.    Consults         Status Ordering Provider     Inpatient consult to Social Work/Case Management  Once     Provider:  (Not yet assigned)    Completed MARLO BAUTISTA SR     IP consult case management/social work  Once     Provider:  (Not yet assigned)    Completed MARLO BAUTISTA SR     IP consult to case management  Once     Provider:  (Not yet assigned)    Completed MARLO BAUTISTA SR          Significant Diagnostic Studies: None    Pending Diagnostic Studies:     None        Final Active Diagnoses:    Diagnosis Date Noted POA    PRINCIPAL PROBLEM:  Arthritis of left hip [M16.12] 08/15/2017 Yes    Acute blood loss anemia [D62] 10/10/2017 Yes    Combined hyperlipidemia associated with type 2 diabetes mellitus [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    Specialties:  Rehabilitation, SNF Agency  Why:  SNF  Contact  information:  [address]  [place] [address]  [phone]             Call Albaro Alcocer Sr, MD.    Specialty:  Orthopedic Surgery  Why:  As needed, If symptoms worsen, For suture removal, For wound re-check  Contact information:  [address] [address]  [phone]                 Patient Instructions:     Diet Adult Regular     Vital signs per facility protocol     Skin assessment every shift      Weight bearing status:   Order Comments: Walk assisted amylase and full weight-bear left lower extremity     Place sequential compression device     Place BA hose     Full code      walk assisted ambulation full weight-bear left lower extremity.  Daily dressing changes with gauze and sponge tape.  Follow-up in 2 weeks.  Resume regular diet.    Medications: Eliquis and Percocet  Reconciled Home Medications:   Discharge Medication List as of 10/12/2017 12:03 PM      CONTINUE these medications which have NOT CHANGED    Details   acetaminophen (TYLENOL) 650 MG TbSR Take 1,300 mg by mouth every 8 (eight) hours., Until Discontinued, Historical Med      apixaban (ELIQUIS) 2.5 mg Tab Take 1 tablet (2.5 mg total) by mouth 2 (two) times daily., Starting Mon 10/9/2017, Print      blood sugar diagnostic (ACCU-CHEK FRANKO PLUS TEST STRP) Strp USE  1  STRIP ONE TIME DAILY, Normal      clonazePAM (KLONOPIN) 0.25 MG TbDL DISSOLVE 2 TABLETS BY MOUTH NIGHTLY, Normal      docusate sodium (COLACE) 100 MG capsule Take 1 capsule (100 mg total) by mouth 2 (two) times daily as needed., Starting 7/22/2016, Until Discontinued, Print      gabapentin (NEURONTIN) 100 MG capsule TAKE 1 CAPSULE BY MOUTH EVERY NIGHT AT BEDTIME FOR 1 WEEK, THEN INCREASE TO 2 CAPSULES EVERY NIGHT AT BEDTIME THEREAFTER, Normal      glipiZIDE (GLUCOTROL) 5 MG tablet TAKE 2 TABLETS (10 MG) TWO TIMES DAILY BEFORE MEALS, Normal      lancets (ACCU-CHEK MULTICLIX LANCET) Misc TEST ONE TIME DAILY, Normal      losartan-hydrochlorothiazide 50-12.5 mg  (HYZAAR) 50-12.5 mg per tablet TAKE 1 TABLET ONE TIME DAILY, Normal      melatonin 5 mg Cap Starting 4/24/2017, Until Discontinued, Historical Med      metformin (GLUCOPHAGE) 1000 MG tablet TAKE 1 TABLET TWICE DAILY WITH MEALS, Normal      metoprolol succinate (TOPROL-XL) 50 MG 24 hr tablet TAKE 1 TABLET EVERY DAY, Normal      multivitamin (ONE DAILY MULTIVITAMIN) per tablet Take 1 tablet by mouth once daily., Historical Med      oxycodone-acetaminophen (PERCOCET)  mg per tablet Take 1 tablet by mouth every 4 (four) hours as needed for Pain., Starting Mon 10/9/2017, Print      oxycodone-acetaminophen (PERCOCET) 5-325 mg per tablet Take 1 tablet by mouth every 4 (four) hours as needed for Pain., Starting 4/18/2017, Until Discontinued, Print      pramipexole (MIRAPEX) 0.25 MG tablet TAKE 1 TABLET BY MOUTH EVERY EVENING, Normal      pravastatin (PRAVACHOL) 40 MG tablet TAKE 1 TABLET EVERY DAY, Normal      senna (SENNA CONCENTRATE) 8.6 mg tablet Take 1 tablet by mouth once daily., Starting 4/18/2017, Until Discontinued, Print      sildenafil (VIAGRA) 100 MG tablet Take 1 tablet (100 mg total) by mouth as needed. Take on an empty stomach, Starting 3/29/2017, Until Discontinued, Print         STOP taking these medications       aspirin (ECOTRIN) 81 MG EC tablet Comments:   Reason for Stopping:         celecoxib (CELEBREX) 200 MG capsule Comments:   Reason for Stopping:               Albaro Alcocer Sr, MD  Orthopedics  [facility]

## 2017-10-13 NOTE — PLAN OF CARE
Problem: Patient Care Overview  Goal: Plan of Care Review  Outcome: Outcome(s) achieved Date Met: 10/12/17  Patient remains free from falls and all safety precautions are still maintained. Pain controlled with meds. Blood glucose monitoring, tolerated well. Ambulated to bathroom and in vivas, tolerated well. Given flu shot today, tolerated well. No s/s of acute distress. Discharging to rehab today. 12 hour chart check.

## 2017-10-16 ENCOUNTER — PATIENT OUTREACH (OUTPATIENT)
Dept: ADMINISTRATIVE | Facility: CLINIC | Age: 77
End: 2017-10-16

## 2017-10-25 DIAGNOSIS — M25.552 LEFT HIP PAIN: Primary | ICD-10-CM

## 2017-10-26 ENCOUNTER — TELEPHONE (OUTPATIENT)
Dept: INTERNAL MEDICINE | Facility: CLINIC | Age: 77
End: 2017-10-26

## 2017-10-26 ENCOUNTER — OFFICE VISIT (OUTPATIENT)
Dept: ORTHOPEDICS | Facility: CLINIC | Age: 77
End: 2017-10-26
Payer: MEDICARE

## 2017-10-26 ENCOUNTER — HOSPITAL ENCOUNTER (OUTPATIENT)
Dept: RADIOLOGY | Facility: HOSPITAL | Age: 77
Discharge: HOME | End: 2017-10-26
Attending: ORTHOPAEDIC SURGERY
Payer: MEDICARE

## 2017-10-26 ENCOUNTER — TELEPHONE (OUTPATIENT)
Dept: ORTHOPEDICS | Facility: CLINIC | Age: 77
End: 2017-10-26

## 2017-10-26 VITALS
DIASTOLIC BLOOD PRESSURE: 62 MMHG | BODY MASS INDEX: 27.03 KG/M2 | TEMPERATURE: 97 F | HEART RATE: 81 BPM | HEIGHT: 75 IN | SYSTOLIC BLOOD PRESSURE: 121 MMHG | WEIGHT: 217.38 LBS

## 2017-10-26 DIAGNOSIS — M25.552 LEFT HIP PAIN: Primary | ICD-10-CM

## 2017-10-26 DIAGNOSIS — Z01.818 PRE-OP TESTING: Primary | ICD-10-CM

## 2017-10-26 DIAGNOSIS — M70.22 OLECRANON BURSITIS OF LEFT ELBOW: Primary | ICD-10-CM

## 2017-10-26 DIAGNOSIS — M25.552 LEFT HIP PAIN: ICD-10-CM

## 2017-10-26 PROCEDURE — 99214 OFFICE O/P EST MOD 30 MIN: CPT | Mod: 24,S$GLB,, | Performed by: ORTHOPAEDIC SURGERY

## 2017-10-26 PROCEDURE — 99499 UNLISTED E&M SERVICE: CPT | Mod: S$GLB,,, | Performed by: ORTHOPAEDIC SURGERY

## 2017-10-26 PROCEDURE — 99999 PR PBB SHADOW E&M-EST. PATIENT-LVL III: CPT | Mod: PBBFAC,,, | Performed by: ORTHOPAEDIC SURGERY

## 2017-10-26 PROCEDURE — 73502 X-RAY EXAM HIP UNI 2-3 VIEWS: CPT | Mod: 26,LT,, | Performed by: RADIOLOGY

## 2017-10-26 PROCEDURE — 73502 X-RAY EXAM HIP UNI 2-3 VIEWS: CPT | Mod: TC,PO,LT

## 2017-10-26 NOTE — PATIENT INSTRUCTIONS
Bursitis of the Elbow (Olecranon)  Your elbow joint contains a small fluid-filled sac called a bursa. The bursa helps the muscles and tendons move smoothly over the bone. It also cushions and protects your elbow. Bursitis is when the bursa is inflamed or swollen. This is most often due to overuse of or injury to the elbow. Symptoms include swelling and pain. If the elbow is red and feels warm to the touch, the bursa itself may be infected.  In most cases, elbow bursitis resolves with medicine and self-care at home. It may take several weeks for the bursa to heal and the swelling to go away. In some cases, your healthcare provider may drain excess fluid from the bursa. Or, he or she may inject medicine directly into the bursa to help relieve symptoms. In severe cases, you may need surgery to remove the bursa may. If there is concern that the bursa is infected, your healthcare provider may prescribe antibiotics to treat the infection.    Home care  Your healthcare provider may prescribe medicine to help relieve pain and swelling. This may be an over-the-counter pain reliever or prescription pain medicine. Take all medicines as directed. To help treat or prevent infection, your provider may prescribe antibiotics. If these are prescribed, take them as directed until they are gone.  The following are general care guidelines:  · Apply an ice pack or bag of frozen peas wrapped in a thin towel to your elbow for 15 to 20 minutes at a time. Do this 3 to 4 times a day until pain and swelling improve.  · Keep your elbow raised above the level of your heart whenever possible. This helps reduce swelling. When sitting or lying down, place your arm on a pillow that rests on your chest or on a pillow at your side.  · Use an elastic wrap around the elbow joint to compress the area while it is healing. Make the wrap snug but not tight to the point of causing pain.  · Rest your elbow to give it time to heal. You may need to wear an  elbow pad to help protect and limit the movement of your elbow. During and after healing, avoid leaning on your elbows.  Follow-up care  Follow up with your healthcare provider, or as advised. If you have been referred to a specialist, make that appointment promptly.  When to seek medical advice  Call your healthcare provider right away if any of these occur:  · Fever of 100.4°F (38°C) or higher, or as advised  · Chills  · Increased pain, swelling, warmth, redness, or drainage from the joint  · Trouble moving the elbow joint  · Numbness or tingling in the hand  · Severe pain or swelling in forearm or hand  · Loss of pink color and slow return of color after squeezing fingertip or hand  Date Last Reviewed: 6/1/2016  © 3127-0254 [employer]. [address] [place] [address], [place] [address]. All rights reserved. This information is not intended as a substitute for professional medical care. Always follow your healthcare professional's instructions.

## 2017-10-26 NOTE — TELEPHONE ENCOUNTER
Scheduled stat surgery (10/27/2017-olecranon bursectomy).  Informed pt of the urgency of this pt's care.  Medical transportation from [facility] stated it would need to be approved before the pt will be transported to hospital for surgery.  Physician stressed the importance of this pt's  Procedure.  Family offered to take patient but was told that it was against [facility]'s policy while the pt is still in their care

## 2017-10-26 NOTE — TELEPHONE ENCOUNTER
----- Message from Sammy Dobson sent at 10/26/2017  8:15 AM CDT -----  Contact: [phone]  Pt would like to schedule a hospital follow up appt. Seen for left hip replacement. Need to be seen before 11/27; Please call pt carlos at [phone]. thx lj

## 2017-10-26 NOTE — PROGRESS NOTES
SUBJECTIVE:  Patient is status post Left YAHIR.  The patient complains of severe left elbow pain.  He indicates that he has had olecranon bursitis in the past.  The patient indicates that he does have a history of lumbar disease.  Patient complains of  4/10 pain that is better with the  pain meds and aggravated with movement.                Past Medical History:   Diagnosis Date    Anemia due to unknown mechanism 11/10/2015    Angina pectoris 2014     not since    Arthritis      Back pain      Coronary artery disease      Diabetes mellitus 20 years      am 09/29/2014    Diabetes mellitus type II 1994      am 12/22/2015    DM (diabetes mellitus) 1994     BS 80 am 01/10/2017    Hyperlipemia      Hypertension      CHARLES (obstructive sleep apnea)      Spinal cord disease      Trouble in sleeping      Type 2 diabetes mellitus with diabetic polyneuropathy, without long-term current use of insulin      Uses hearing aid       bilat                Past Surgical History:   Procedure Laterality Date    HERNIA REPAIR Bilateral 04/18/2017    LAMINECTOMY   07/22/2016    ROTATOR CUFF REPAIR Left 2006    SHOULDER ARTHROSCOPY W/ ROTATOR CUFF REPAIR Right 2009                Review of patient's allergies indicates:   Allergen Reactions    Sulfa (sulfonamide antibiotics)         Can't recall from 1995      No current facility-administered medications on file prior to encounter.                   Current Outpatient Prescriptions on File Prior to Encounter   Medication Sig Dispense Refill    metformin (GLUCOPHAGE) 1000 MG tablet TAKE 1 TABLET TWICE DAILY WITH MEALS 180 tablet 4    acetaminophen (TYLENOL) 650 MG TbSR Take 1,300 mg by mouth every 8 (eight) hours.        blood sugar diagnostic (ACCU-CHEK FRANKO PLUS TEST STRP) Strp USE  1  STRIP ONE TIME DAILY 100 strip 4    docusate sodium (COLACE) 100 MG capsule Take 1 capsule (100 mg total) by mouth 2 (two) times daily as needed. 60 capsule 0     "gabapentin (NEURONTIN) 100 MG capsule TAKE 1 CAPSULE BY MOUTH EVERY NIGHT AT BEDTIME FOR 1 WEEK, THEN INCREASE TO 2 CAPSULES EVERY NIGHT AT BEDTIME THEREAFTER 180 capsule 1    glipiZIDE (GLUCOTROL) 5 MG tablet TAKE 2 TABLETS (10 MG) TWO TIMES DAILY BEFORE MEALS 360 tablet 4    lancets (ACCU-CHEK MULTICLIX LANCET) Misc TEST ONE TIME DAILY 100 each 3    losartan-hydrochlorothiazide 50-12.5 mg (HYZAAR) 50-12.5 mg per tablet TAKE 1 TABLET ONE TIME DAILY 90 tablet 4    melatonin 5 mg Cap          metoprolol succinate (TOPROL-XL) 50 MG 24 hr tablet TAKE 1 TABLET EVERY DAY 90 tablet 3    oxycodone-acetaminophen (PERCOCET) 5-325 mg per tablet Take 1 tablet by mouth every 4 (four) hours as needed for Pain. 65 tablet 0    pravastatin (PRAVACHOL) 40 MG tablet TAKE 1 TABLET EVERY DAY (Patient taking differently: TAKE 1 TABLET EVERY NIGHT) 90 tablet 4    senna (SENNA CONCENTRATE) 8.6 mg tablet Take 1 tablet by mouth once daily. 60 tablet 0    sildenafil (VIAGRA) 100 MG tablet Take 1 tablet (100 mg total) by mouth as needed. Take on an empty stomach 6 tablet 11      /63 (BP Location: Left arm, Patient Position: Lying)   Pulse 76   Temp 98 °F (36.7 °C) (Oral)   Resp 18   Ht 6' 3" (1.905 m)   Wt 98.6 kg (217 lb 6 oz)   SpO2 96%   BMI 27.17 kg/m²    ROS:  GENERAL: No fever, chills, fatigability or weight loss.  SKIN: No rashes, itching or changes in color or texture of skin.  HEAD: No headaches or recent head trauma.  EYES: Visual acuity fine. No photophobia, ocular pain or diplopia.  EARS: Denies ear pain, discharge or vertigo.  NOSE: No loss of smell, no epistaxis or postnasal drip.  MOUTH & THROAT: No hoarseness or change in voice. No excessive gum bleeding.  NODES: Denies swollen glands.  CHEST: Denies DAMON, cyanosis, wheezing, cough and sputum production.  CARDIOVASCULAR: Denies chest pain, PND, orthopnea or reduced exercise tolerance.  ABDOMEN: Appetite fine. No weight loss. Denies diarrhea, abdominal " "pain, hematemesis or blood in stool.  URINARY: No flank pain, dysuria or hematuria.  PERIPHERAL VASCULAR: No claudication or cyanosis.  NEUROLOGIC: No history of seizures, paralysis, alteration of gait or coordination.  MUSCULOSKELETAL: See HPI     PE:  APPEARANCE: Well nourished, well developed, in no acute distress.   HEAD: Normocephalic, atraumatic.  EYES: PERRL. EOMI.   EARS: TM's intact. Light reflex normal. No retraction or perforation.   NOSE: Mucosa pink. Airway clear.  MOUTH & THROAT: No tonsillar enlargement. No pharyngeal erythema or exudate. No stridor.  NECK: Supple.   NODES: No cervical, axillary or inguinal lymph node enlargement.  CHEST: Lungs clear to auscultation.  CARDIOVASCULAR: Normal S1, S2. No rubs, murmurs or gallops.  ABDOMEN: Bowel sounds normal. Not distended. Soft. No tenderness or masses.  NEUROLOGIC: Cranial Nerves: II-XII grossly intact, also see MUSCULOSKELETAL  MUSCULOSKELETAL:        Left   Elbow  Dressing intact, 2 plus radial  artery and ulnar artery pulses, light touch intact Left upper extremity.  All digits are warm. No erythema, no warmth, no drainage,  Moderate swelling,  significant tenderness.  Enlarged bursa      ASSESSMENT:  The patient is stable and His left hip is improving.  The patient is a diabetic.  He understands that he has an Inflamed and likely infected left olecranon bursa.  It is prudent to treat this aggressively with excision so that he does not develop a sepsis that could proceed his left   Replacement.    Assessment:  1.  Infected left olecranon bursa               Left Hip   -dressing intact, 2 plus dorsalis pedis and posterior tibial artery pulses, light touch intact Left lower extremity.  All digits are warm. No erythema, no warmth, no drainage, minimum swelling, mild tenderness.  Less than 2 seconds capillary refill all digits.     /62   Pulse 81   Temp 97.2 °F (36.2 °C)   Ht 6' 3" (1.905 m)   Wt 98.6 kg (217 lb 6 oz)   BMI 27.17 kg/m²      "      PLAN:   Excision of left olecranon bursa  Left hip sutures removed.  Continue pain medication  Continue wound care  Continue physical therapy

## 2017-10-27 ENCOUNTER — ANESTHESIA EVENT (OUTPATIENT)
Dept: SURGERY | Facility: HOSPITAL | Age: 77
End: 2017-10-27
Payer: MEDICARE

## 2017-10-27 ENCOUNTER — HOSPITAL ENCOUNTER (OUTPATIENT)
Facility: HOSPITAL | Age: 77
Discharge: HOME | End: 2017-10-27
Attending: ORTHOPAEDIC SURGERY | Admitting: ORTHOPAEDIC SURGERY
Payer: MEDICARE

## 2017-10-27 ENCOUNTER — ANESTHESIA (OUTPATIENT)
Dept: SURGERY | Facility: HOSPITAL | Age: 77
End: 2017-10-27
Payer: MEDICARE

## 2017-10-27 DIAGNOSIS — M70.22 OLECRANON BURSITIS OF LEFT ELBOW: Primary | ICD-10-CM

## 2017-10-27 LAB
POCT GLUCOSE: 101 MG/DL (ref 70–110)
POCT GLUCOSE: 108 MG/DL (ref 70–110)

## 2017-10-27 PROCEDURE — 24341 RPR TDN/MUSC UPR A/E EACH: CPT | Mod: AS,LT,, | Performed by: PHYSICIAN ASSISTANT

## 2017-10-27 PROCEDURE — 63600175 PHARM REV CODE 636 W HCPCS: Performed by: ANESTHESIOLOGY

## 2017-10-27 PROCEDURE — 25000003 PHARM REV CODE 250: Performed by: NURSE ANESTHETIST, CERTIFIED REGISTERED

## 2017-10-27 PROCEDURE — 25000003 PHARM REV CODE 250: Performed by: ORTHOPAEDIC SURGERY

## 2017-10-27 PROCEDURE — 82962 GLUCOSE BLOOD TEST: CPT | Performed by: ORTHOPAEDIC SURGERY

## 2017-10-27 PROCEDURE — 37000008 HC ANESTHESIA 1ST 15 MINUTES: Performed by: ORTHOPAEDIC SURGERY

## 2017-10-27 PROCEDURE — 37000009 HC ANESTHESIA EA ADD 15 MINS: Performed by: ORTHOPAEDIC SURGERY

## 2017-10-27 PROCEDURE — 71000039 HC RECOVERY, EACH ADD'L HOUR: Performed by: ORTHOPAEDIC SURGERY

## 2017-10-27 PROCEDURE — 63600175 PHARM REV CODE 636 W HCPCS: Performed by: ORTHOPAEDIC SURGERY

## 2017-10-27 PROCEDURE — 24120 EXC/CRTG B1 CST/B9 TUM RDS: CPT | Mod: AS,51,LT, | Performed by: PHYSICIAN ASSISTANT

## 2017-10-27 PROCEDURE — 88305 TISSUE EXAM BY PATHOLOGIST: CPT | Performed by: PATHOLOGY

## 2017-10-27 PROCEDURE — 24120 EXC/CRTG B1 CST/B9 TUM RDS: CPT | Mod: 51,LT,, | Performed by: ORTHOPAEDIC SURGERY

## 2017-10-27 PROCEDURE — 88305 TISSUE EXAM BY PATHOLOGIST: CPT | Mod: 26,,, | Performed by: PATHOLOGY

## 2017-10-27 PROCEDURE — C1713 ANCHOR/SCREW BN/BN,TIS/BN: HCPCS | Performed by: ORTHOPAEDIC SURGERY

## 2017-10-27 PROCEDURE — 63600175 PHARM REV CODE 636 W HCPCS: Performed by: NURSE ANESTHETIST, CERTIFIED REGISTERED

## 2017-10-27 PROCEDURE — 71000015 HC POSTOP RECOV 1ST HR: Performed by: ORTHOPAEDIC SURGERY

## 2017-10-27 PROCEDURE — 24341 RPR TDN/MUSC UPR A/E EACH: CPT | Mod: LT,,, | Performed by: ORTHOPAEDIC SURGERY

## 2017-10-27 PROCEDURE — 36000708 HC OR TIME LEV III 1ST 15 MIN: Performed by: ORTHOPAEDIC SURGERY

## 2017-10-27 PROCEDURE — 36000709 HC OR TIME LEV III EA ADD 15 MIN: Performed by: ORTHOPAEDIC SURGERY

## 2017-10-27 PROCEDURE — 71000033 HC RECOVERY, INTIAL HOUR: Performed by: ORTHOPAEDIC SURGERY

## 2017-10-27 DEVICE — ANCHOR HEALIX 5.5 W/NEEDLE: Type: IMPLANTABLE DEVICE | Site: ELBOW | Status: FUNCTIONAL

## 2017-10-27 RX ORDER — SODIUM CHLORIDE, SODIUM LACTATE, POTASSIUM CHLORIDE, CALCIUM CHLORIDE 600; 310; 30; 20 MG/100ML; MG/100ML; MG/100ML; MG/100ML
INJECTION, SOLUTION INTRAVENOUS CONTINUOUS PRN
Status: DISCONTINUED | OUTPATIENT
Start: 2017-10-27 | End: 2017-10-27

## 2017-10-27 RX ORDER — MIDAZOLAM HYDROCHLORIDE 1 MG/ML
INJECTION, SOLUTION INTRAMUSCULAR; INTRAVENOUS
Status: DISCONTINUED | OUTPATIENT
Start: 2017-10-27 | End: 2017-10-27

## 2017-10-27 RX ORDER — FENTANYL CITRATE 50 UG/ML
INJECTION, SOLUTION INTRAMUSCULAR; INTRAVENOUS
Status: DISCONTINUED | OUTPATIENT
Start: 2017-10-27 | End: 2017-10-27

## 2017-10-27 RX ORDER — ROCURONIUM BROMIDE 10 MG/ML
INJECTION, SOLUTION INTRAVENOUS
Status: DISCONTINUED | OUTPATIENT
Start: 2017-10-27 | End: 2017-10-27

## 2017-10-27 RX ORDER — PROPOFOL 10 MG/ML
VIAL (ML) INTRAVENOUS
Status: DISCONTINUED | OUTPATIENT
Start: 2017-10-27 | End: 2017-10-27

## 2017-10-27 RX ORDER — OXYCODONE AND ACETAMINOPHEN 10; 325 MG/1; MG/1
1 TABLET ORAL EVERY 4 HOURS PRN
Qty: 60 TABLET | Refills: 0 | Status: SHIPPED | OUTPATIENT
Start: 2017-10-27 | End: 2018-01-23

## 2017-10-27 RX ORDER — HYDROMORPHONE HYDROCHLORIDE 2 MG/ML
0.2 INJECTION, SOLUTION INTRAMUSCULAR; INTRAVENOUS; SUBCUTANEOUS EVERY 5 MIN PRN
Status: DISCONTINUED | OUTPATIENT
Start: 2017-10-27 | End: 2017-10-27 | Stop reason: HOSPADM

## 2017-10-27 RX ORDER — CEFAZOLIN SODIUM 2 G/50ML
2 SOLUTION INTRAVENOUS
Status: DISCONTINUED | OUTPATIENT
Start: 2017-10-27 | End: 2017-10-27 | Stop reason: HOSPADM

## 2017-10-27 RX ORDER — SUCCINYLCHOLINE CHLORIDE 20 MG/ML
INJECTION INTRAMUSCULAR; INTRAVENOUS
Status: DISCONTINUED | OUTPATIENT
Start: 2017-10-27 | End: 2017-10-27

## 2017-10-27 RX ORDER — ACETAMINOPHEN 10 MG/ML
1000 INJECTION, SOLUTION INTRAVENOUS ONCE
Status: COMPLETED | OUTPATIENT
Start: 2017-10-27 | End: 2017-10-27

## 2017-10-27 RX ORDER — ONDANSETRON 2 MG/ML
INJECTION INTRAMUSCULAR; INTRAVENOUS
Status: DISCONTINUED | OUTPATIENT
Start: 2017-10-27 | End: 2017-10-27

## 2017-10-27 RX ORDER — CHLORHEXIDINE GLUCONATE ORAL RINSE 1.2 MG/ML
10 SOLUTION DENTAL
Status: DISCONTINUED | OUTPATIENT
Start: 2017-10-27 | End: 2017-10-27 | Stop reason: HOSPADM

## 2017-10-27 RX ORDER — HYDROCODONE BITARTRATE AND ACETAMINOPHEN 5; 325 MG/1; MG/1
1 TABLET ORAL EVERY 4 HOURS PRN
Status: DISCONTINUED | OUTPATIENT
Start: 2017-10-27 | End: 2017-10-27 | Stop reason: HOSPADM

## 2017-10-27 RX ORDER — LIDOCAINE HCL/PF 100 MG/5ML
SYRINGE (ML) INTRAVENOUS
Status: DISCONTINUED | OUTPATIENT
Start: 2017-10-27 | End: 2017-10-27

## 2017-10-27 RX ORDER — BUPIVACAINE HYDROCHLORIDE 2.5 MG/ML
INJECTION, SOLUTION EPIDURAL; INFILTRATION; INTRACAUDAL
Status: DISCONTINUED | OUTPATIENT
Start: 2017-10-27 | End: 2017-10-27 | Stop reason: HOSPADM

## 2017-10-27 RX ORDER — DOXYCYCLINE 100 MG/1
100 CAPSULE ORAL 2 TIMES DAILY
Qty: 40 CAPSULE | Refills: 0 | Status: SHIPPED | OUTPATIENT
Start: 2017-10-27 | End: 2017-12-05 | Stop reason: ALTCHOICE

## 2017-10-27 RX ORDER — LIDOCAINE HYDROCHLORIDE 10 MG/ML
INJECTION, SOLUTION EPIDURAL; INFILTRATION; INTRACAUDAL; PERINEURAL
Status: DISCONTINUED | OUTPATIENT
Start: 2017-10-27 | End: 2017-10-27 | Stop reason: HOSPADM

## 2017-10-27 RX ORDER — MORPHINE SULFATE 10 MG/ML
2 INJECTION INTRAMUSCULAR; INTRAVENOUS; SUBCUTANEOUS EVERY 5 MIN PRN
Status: DISCONTINUED | OUTPATIENT
Start: 2017-10-27 | End: 2017-10-27 | Stop reason: HOSPADM

## 2017-10-27 RX ADMIN — ROCURONIUM BROMIDE 5 MG: 10 INJECTION, SOLUTION INTRAVENOUS at 11:10

## 2017-10-27 RX ADMIN — MORPHINE SULFATE 2 MG: 10 INJECTION, SOLUTION INTRAMUSCULAR; INTRAVENOUS at 01:10

## 2017-10-27 RX ADMIN — SODIUM CHLORIDE, SODIUM LACTATE, POTASSIUM CHLORIDE, AND CALCIUM CHLORIDE: 600; 310; 30; 20 INJECTION, SOLUTION INTRAVENOUS at 11:10

## 2017-10-27 RX ADMIN — ONDANSETRON 4 MG: 2 INJECTION, SOLUTION INTRAMUSCULAR; INTRAVENOUS at 12:10

## 2017-10-27 RX ADMIN — ACETAMINOPHEN 1000 MG: 10 INJECTION, SOLUTION INTRAVENOUS at 02:10

## 2017-10-27 RX ADMIN — LIDOCAINE HYDROCHLORIDE 75 MG: 20 INJECTION, SOLUTION INTRAVENOUS at 11:10

## 2017-10-27 RX ADMIN — FENTANYL CITRATE 100 MCG: 50 INJECTION, SOLUTION INTRAMUSCULAR; INTRAVENOUS at 11:10

## 2017-10-27 RX ADMIN — CEFAZOLIN SODIUM 2 G: 2 SOLUTION INTRAVENOUS at 11:10

## 2017-10-27 RX ADMIN — SUCCINYLCHOLINE CHLORIDE 160 MG: 20 INJECTION, SOLUTION INTRAMUSCULAR; INTRAVENOUS at 11:10

## 2017-10-27 RX ADMIN — FENTANYL CITRATE 50 MCG: 50 INJECTION, SOLUTION INTRAMUSCULAR; INTRAVENOUS at 01:10

## 2017-10-27 RX ADMIN — PROPOFOL 100 MG: 10 INJECTION, EMULSION INTRAVENOUS at 11:10

## 2017-10-27 RX ADMIN — FENTANYL CITRATE 50 MCG: 50 INJECTION, SOLUTION INTRAMUSCULAR; INTRAVENOUS at 12:10

## 2017-10-27 RX ADMIN — MIDAZOLAM HYDROCHLORIDE 2 MG: 1 INJECTION, SOLUTION INTRAMUSCULAR; INTRAVENOUS at 11:10

## 2017-10-27 NOTE — TRANSFER OF CARE
"Anesthesia Transfer of Care Note    Patient: Srinath Mcknight    Procedure(s) Performed: Procedure(s) (LRB):  BURSECTOMY - ELBOW (Left)    Patient location: PACU    Anesthesia Type: general    Transport from OR: Transported from OR on room air with adequate spontaneous ventilation    Post pain: adequate analgesia    Post assessment: no apparent anesthetic complications and tolerated procedure well    Post vital signs: stable    Level of consciousness: awake, alert and oriented    Nausea/Vomiting: no nausea/vomiting    Complications: none    Transfer of care protocol was followed      Last vitals:   Visit Vitals  BP (!) 131/58 (BP Location: Right arm, Patient Position: Sitting)   Pulse 70   Temp 36.5 °C (97.7 °F) (Temporal)   Resp 18   Ht 6' 3" (1.905 m)   Wt 97.4 kg (214 lb 11.7 oz)   SpO2 100%   BMI 26.84 kg/m²     "

## 2017-10-27 NOTE — OP NOTE
OPERATIVE DICTATION:    DATE OF SERVICE:10/27/2017      Preoperative Diagnosis:  Left olecranon bursitis    Postoperative Diagnosis:   Left olecranon bursitis, exostosis of the olecranon, rupture of the triceps tendon, skin defect    Procedure: Left olecranon bursectomy with exostosis excision of the ulna,   triceps tendon repair, soft tissue rearrangement for defect measuring 9 x 1 cm.    Indication for surgery:  Left olecranon bursitis    Anesthesia:  Gen. anesthesia    Complications:  None    Surgeon: Albaro Alcocer M.D.     Specimen: Left olecranon bursa and skin    Assistant:  QUETA Mathew. His assistance was critical and necesssary with positioning of the extremity throughout the surgical procedure.The physician assistant allowed me to access areas of the left elbow that could not be possible without the assistance of a trained orthopedic physician assistant.  His assistance was critical to allowing me to provide the highest level of care to the patient.      Operative procedure:  The patient brought to operating room after appropriate consent and placed under general anesthesia with intubation.  The patient placed in a right lateral decubitus position.  The left upper extremity was prepped with alcohol, chlorhexidine and sterilely draped.  The left upper extremity was elevated for 5 minutes and the tourniquet inflated to 250 mmHg pressure.  The timeout was performed and the correct extremity identified.  A 9 cm incision was made over the posterior ulnar aspect of the left elbow.  Dissection carried through subcutaneous tissue and the olecranon bursa identified.  The bursa excised en bloc with an island of soft tissue measuring 1 x 4 cm.  The bursa noted to have chalky material consistent with that of gouty arthritis.  The patient noted to have a large osteophyte over the olecranon with erosion and rupture of the triceps tendon.  The osteotome used to excise the exostosis.  The tendon was debrided down  to bleeding tissue.  The curette used to make the starting hole into the olecranon and the 5.5 anchor bioabsorbable anchor placed into the olecranon.  The [place] suture.  Then placed through the triceps tendon and the tendon was repaired down to bone.  Irrigation was thoroughly performed.  The subcutaneous tissue opposed with #1 Vicryl suture.  The soft tissue was rearranged to minimize any dead space deep to the skin incision.  The overlying skin reinforced with a running 3-0 nylon suture.  The patient injected with 25% Marcaine and 1% Xylocaine plain.  Betadine and sterile gauze placed over the incision site.  A compressive bandage applied the patient placed in a long-arm posterior splint.  The tourniquet deflated and the patient noted to have  good   capillary refill all digits.  The tourniquet deflated and the patient left the operating room in good condition.                 Albaro Alcocer M.D.

## 2017-10-27 NOTE — PLAN OF CARE
Pt moaning in pain. Dr. Jack made aware of need of PACU orders. Neuro vascular checks intact. VSS. See flow sheet for detailed assessment. Will cont to monitor.

## 2017-10-27 NOTE — DISCHARGE SUMMARY
[facility] -   Brief Operative Note     SUMMARY     Surgery Date: 10/27/2017     Surgeon(s) and Role:     * Albaro Alcocer Sr., MD - Primary    Assisting Surgeon: None    Pre-op Diagnosis:  Olecranon bursitis of left elbow [M70.22]    Post-op Diagnosis:  Post-Op Diagnosis Codes:     * Olecranon bursitis of left elbow [M70.22]  Left olecranon bursitis, exostosis of the olecranon, rupture of the triceps tendon, skin defect    Procedure(s) (LRB):  BURSECTOMY - ELBOW (Left)  Left olecranon bursectomy with exostosis excision of the ulna,   triceps tendon repair, soft tissue rearrangement for defect measuring 9 x 1 cm.    Anesthesia: General    Description of the findings of the procedure:  Left olecranon bursectomy with exostosis excision of the ulna,   triceps tendon repair, soft tissue rearrangement for defect measuring 9 x 1 cm.    Findings/Key Components: Left olecranon bursectomy with exostosis excision of the ulna,   triceps tendon repair, soft tissue rearrangement for defect measuring 9 x 1 cm.    Estimated Blood Loss: 5 mL         Specimens:   Specimen (12h ago through future)    Start     Ordered    10/27/17 1253  Specimen to Pathology - Surgery  Once     Comments:  1.) Olecranon BursaDX: Bursitis of Left Elbow      10/27/17 1256          Discharge Note    SUMMARY     Admit Date: 10/27/2017    Discharge Date and Time:  10/27/2017 1:22 PM    Hospital Course (synopsis of major diagnoses, care, treatment, and services provided during the course of the hospital stay):  Without complication     Final Diagnosis: Post-Op Diagnosis Codes:     * Olecranon bursitis of left elbow [M70.22]  Left olecranon bursitis, exostosis of the olecranon, rupture of the triceps tendon, skin defect    Disposition: Home or Self Care    Follow Up/Patient Instructions: Follow-up in 2 weeks, continue antibiotics, leave the dressing intact.  She can continue to full weight-bear left lower extremity.  Keep dressing  intact.    Medications: Continue the Percocet and doxycycline.  Reconciled Home Medications:   Current Discharge Medication List      CONTINUE these medications which have NOT CHANGED    Details   apixaban (ELIQUIS) 2.5 mg Tab Take 1 tablet (2.5 mg total) by mouth 2 (two) times daily.  Qty: 80 tablet, Refills: 0      glipiZIDE (GLUCOTROL) 5 MG tablet TAKE 2 TABLETS (10 MG) TWO TIMES DAILY BEFORE MEALS  Qty: 360 tablet, Refills: 4      losartan-hydrochlorothiazide 50-12.5 mg (HYZAAR) 50-12.5 mg per tablet TAKE 1 TABLET ONE TIME DAILY  Qty: 90 tablet, Refills: 4      metformin (GLUCOPHAGE) 1000 MG tablet TAKE 1 TABLET TWICE DAILY WITH MEALS  Qty: 180 tablet, Refills: 4      metoprolol succinate (TOPROL-XL) 50 MG 24 hr tablet TAKE 1 TABLET EVERY DAY  Qty: 90 tablet, Refills: 3      multivitamin (ONE DAILY MULTIVITAMIN) per tablet Take 1 tablet by mouth once daily.      pravastatin (PRAVACHOL) 40 MG tablet TAKE 1 TABLET EVERY DAY  Qty: 90 tablet, Refills: 4      acetaminophen (TYLENOL) 650 MG TbSR Take 1,300 mg by mouth every 8 (eight) hours.      blood sugar diagnostic (ACCU-CHEK FRANKO PLUS TEST STRP) Strp USE  1  STRIP ONE TIME DAILY  Qty: 100 strip, Refills: 4      clonazePAM (KLONOPIN) 0.25 MG TbDL DISSOLVE 2 TABLETS BY MOUTH NIGHTLY  Qty: 90 tablet, Refills: 0    Associated Diagnoses: Parasomnia      docusate sodium (COLACE) 100 MG capsule Take 1 capsule (100 mg total) by mouth 2 (two) times daily as needed.  Qty: 60 capsule, Refills: 0      gabapentin (NEURONTIN) 100 MG capsule TAKE 1 CAPSULE BY MOUTH EVERY NIGHT AT BEDTIME FOR 1 WEEK, THEN INCREASE TO 2 CAPSULES EVERY NIGHT AT BEDTIME THEREAFTER  Qty: 180 capsule, Refills: 1    Comments: **Patient requests 90 days supply**      lancets (ACCU-CHEK MULTICLIX LANCET) Misc TEST ONE TIME DAILY  Qty: 100 each, Refills: 3      oxycodone-acetaminophen (PERCOCET)  mg per tablet Take 1 tablet by mouth every 4 (four) hours as needed for Pain.  Qty: 60 tablet, Refills:  0      pramipexole (MIRAPEX) 0.25 MG tablet TAKE 1 TABLET BY MOUTH EVERY EVENING  Qty: 30 tablet, Refills: 5    Associated Diagnoses: PLMD (periodic limb movement disorder)      senna (SENNA CONCENTRATE) 8.6 mg tablet Take 1 tablet by mouth once daily.  Qty: 60 tablet, Refills: 0      sildenafil (VIAGRA) 100 MG tablet Take 1 tablet (100 mg total) by mouth as needed. Take on an empty stomach  Qty: 6 tablet, Refills: 11    Associated Diagnoses: Erectile dysfunction, unspecified erectile dysfunction type         STOP taking these medications       melatonin 5 mg Cap Comments:   Reason for Stopping:         oxycodone-acetaminophen (PERCOCET) 5-325 mg per tablet Comments:   Reason for Stopping:               Discharge Procedure Orders  Diet general     Call MD for:  temperature >100.4     Call MD for:  persistent nausea and vomiting     Call MD for:  severe uncontrolled pain     Call MD for:  difficulty breathing, headache or visual disturbances     Call MD for:  redness, tenderness, or signs of infection (pain, swelling, redness, odor or green/yellow discharge around incision site)     Call MD for:  hives     Call MD for:  persistent dizziness or light-headedness     Call MD for:  extreme fatigue     Leave dressing on - Keep it clean, dry, and intact until clinic visit       Follow-up Information     Call Albaro Alcocer Sr, MD.    Specialty:  Orthopedic Surgery  Why:  As needed, If symptoms worsen, For suture removal, For wound re-check  Contact information:  [address] [address]  [phone]

## 2017-10-27 NOTE — ANESTHESIA PREPROCEDURE EVALUATION
10/27/2017  Srinath Mcknight is a 76 y.o., male.    Pre-op Assessment    I have reviewed the Patient Summary Reports.     I have reviewed the Nursing Notes.   I have reviewed the Medications.     Review of Systems  Anesthesia Hx:  No problems with previous Anesthesia  Denies Family Hx of Anesthesia complications.   Denies Personal Hx of Anesthesia complications.   Social:  Non-Smoker    Cardiovascular:   Hypertension, well controlled CAD asymptomatic   Denies Angina. ECG has been reviewed.    Pulmonary:   Sleep Apnea    Renal/:  Renal/ Normal     Hepatic/GI:  Hepatic/GI Normal    Musculoskeletal:   Arthritis     Neurological:   polyneuropathy   Endocrine:   Diabetes, type 2        Physical Exam  General:  Well nourished    Airway/Jaw/Neck:  Airway Findings: General Airway Assessment: Adult Mallampati: I       Chest/Lungs:  Chest/Lungs Findings: Clear to auscultation     Heart/Vascular:  Heart Findings: Rate: Normal             Anesthesia Plan  Type of Anesthesia, risks & benefits discussed:  Anesthesia Type:  general  Patient's Preference:   Intra-op Monitoring Plan:   Intra-op Monitoring Plan Comments:   Post Op Pain Control Plan: IV/PO Opioids PRN  Post Op Pain Control Plan Comments:   Induction:   IV  Beta Blocker:  Patient is on a Beta-Blocker and has received one dose within the past 24 hours (No further documentation required).       Informed Consent: Patient understands risks and agrees with Anesthesia plan.  Questions answered. Anesthesia consent signed with patient.  ASA Score: 3     Day of Surgery Review of History & Physical: I have interviewed and examined the patient. I have reviewed the patient's H&P dated:  There are no significant changes.

## 2017-10-27 NOTE — PLAN OF CARE
Patient prepared for surgery. Girlfriend at bedside. Patient states he was just discharged from rehab for a hip replacement today. Instructed patient and girlfriend that he will need help when he is discharged to home. Patient and girlfriend verbalized understanding. Girlfriend's cell phone number obtained and written on chart. Instructed Karina to expect an unknown number to call her later regarding patient's prescription from the [facility] pharmacy.

## 2017-10-27 NOTE — DISCHARGE INSTRUCTIONS
General Information:    1. Do not drink alcoholic beverages including beer for 24 hours or as long as you are on pain medication..  2. Do not drive a motor vehicle, operate machinery or power tools, or signs legal papers for 24 hours or as long as you are on pain medication.   3. You may experience light-headedness, dizziness, and sleepiness following surgery. Please do not stay alone. A responsible adult should be with you for this 24 hour period.  4. Go home and rest.  5. Progress slowly to a normal diet unless instructed.  Otherwise, begin with liquids such as soft drinks, then soup and crackers working up to solid foods. Drink plenty of nonalcoholic fluids.  6. Certain anesthetics and pain medications produce nausea and vomiting in certain individuals. If nausea becomes a problem at home, call you doctor.  7. A nurse will be calling you sometime after surgery. Do not be alarmed. This is our way of finding out how you are doing.  8. Several times every hour while you are awake, take 2-3 deep breaths and cough. If you had stomach surgery hold a pillow or rolled towel firmly against your stomach before you cough. This will help with any pain the cough might cause.  9. Several times every hour while you are awake, pump and flex your feet 5-6 times and do foot circles. This will help prevent blood clots.  10. Call your doctor for severe pain, bleeding, fever, or signs or symptoms of infection (pain, swelling, redness, foul odor, drainage).  11. You can contact your doctor anytime by [phone] for the [facility] (at [facility]) or [phone] for the [facility] on [facility].   [email or web address] is another way to contact your doctor if you are an active participant online with [facility].

## 2017-11-02 ENCOUNTER — OFFICE VISIT (OUTPATIENT)
Dept: INTERNAL MEDICINE | Facility: CLINIC | Age: 77
End: 2017-11-02
Payer: MEDICARE

## 2017-11-02 ENCOUNTER — TELEPHONE (OUTPATIENT)
Dept: INTERNAL MEDICINE | Facility: CLINIC | Age: 77
End: 2017-11-02

## 2017-11-02 ENCOUNTER — HOSPITAL ENCOUNTER (OUTPATIENT)
Dept: RADIOLOGY | Facility: HOSPITAL | Age: 77
Discharge: HOME | End: 2017-11-02
Attending: INTERNAL MEDICINE
Payer: MEDICARE

## 2017-11-02 VITALS
BODY MASS INDEX: 27.3 KG/M2 | SYSTOLIC BLOOD PRESSURE: 120 MMHG | DIASTOLIC BLOOD PRESSURE: 70 MMHG | TEMPERATURE: 97 F | WEIGHT: 219.56 LBS | HEART RATE: 86 BPM | HEIGHT: 75 IN

## 2017-11-02 DIAGNOSIS — D64.9 ANEMIA, UNSPECIFIED TYPE: Primary | ICD-10-CM

## 2017-11-02 DIAGNOSIS — M70.22 OLECRANON BURSITIS OF LEFT ELBOW: ICD-10-CM

## 2017-11-02 DIAGNOSIS — R60.0 LOCALIZED EDEMA: ICD-10-CM

## 2017-11-02 DIAGNOSIS — Z96.642 STATUS POST HIP REPLACEMENT, LEFT: Primary | ICD-10-CM

## 2017-11-02 DIAGNOSIS — D62 ACUTE BLOOD LOSS ANEMIA: ICD-10-CM

## 2017-11-02 DIAGNOSIS — I15.2 HYPERTENSION ASSOCIATED WITH DIABETES: Chronic | ICD-10-CM

## 2017-11-02 DIAGNOSIS — E11.59 HYPERTENSION ASSOCIATED WITH DIABETES: Chronic | ICD-10-CM

## 2017-11-02 PROBLEM — M16.12 ARTHRITIS OF LEFT HIP: Status: RESOLVED | Noted: 2017-08-15 | Resolved: 2017-11-02

## 2017-11-02 PROCEDURE — 93970 EXTREMITY STUDY: CPT | Mod: TC,PO

## 2017-11-02 PROCEDURE — 99214 OFFICE O/P EST MOD 30 MIN: CPT | Mod: S$GLB,,, | Performed by: INTERNAL MEDICINE

## 2017-11-02 PROCEDURE — 99499 UNLISTED E&M SERVICE: CPT | Mod: S$GLB,,, | Performed by: INTERNAL MEDICINE

## 2017-11-02 PROCEDURE — 99999 PR PBB SHADOW E&M-EST. PATIENT-LVL III: CPT | Mod: PBBFAC,,, | Performed by: INTERNAL MEDICINE

## 2017-11-02 PROCEDURE — 93970 EXTREMITY STUDY: CPT | Mod: 26,,, | Performed by: RADIOLOGY

## 2017-11-02 RX ORDER — CLINDAMYCIN HYDROCHLORIDE 300 MG/1
CAPSULE ORAL
COMMUNITY
Start: 2017-10-28 | End: 2017-11-14

## 2017-11-02 RX ORDER — POTASSIUM CHLORIDE 20 MEQ/1
TABLET, EXTENDED RELEASE ORAL
COMMUNITY
Start: 2017-10-28 | End: 2017-12-12 | Stop reason: ALTCHOICE

## 2017-11-02 NOTE — PROGRESS NOTES
Subjective:       Patient ID: Srinath Mcknight is a 76 y.o. male.    Chief Complaint: Follow-up    HPI  Patient is a 76-year-old male presenting today following up on hip replacement and olecranon bursa surgery.  Patient went in to [facility] in early October for a left total hip arthroplasty.  He had a relatively typical course complicated by some postoperative anemia but had no other major issues.  He was discharged from the hospital to a rehabilitation facility after been in a rehabilitation facility for a few days started having problems with swelling and pain in his left elbow as well as some GI issues.    As these symptoms became more prevalent he was transferred from the rehabilitation facility to the [facility].  He was apparently told at the rehabilitation facility that he was not allowed to go to [facility].  Subsequently he was admitted to the [facility] for several days.  I do not have records from that hospitalization.  I'm not sure exactly what they did form at that time.  He states that they treated him for his elbow and his GI issues which got better and then he was discharged again.    Shortly after being discharged he was seen by Dr. Alcocer for surgery follow-up.  At that time Dr. Alcocer was concerned about the status of his elbow and his left carotid bursitis.  He felt concerned that the issues and the bursa would cause problems for the healing of the hip it is worried about infection so he readmitted him to the hospital and taken to the OR to remove the bursa and clean that up.  He has done well from that procedure.  He is currently recuperating from that.  He's noted no real problems from the elbow at this time.  He is not doing any therapy on that at this time.    He is back at home at this point.  He is doing regular exercises for his hip he is not being seen by physical therapist at this time.  He does complain that he's been swollen in his legs ever since surgery.  He states he  "notices immediately after surgery and it has not really gotten better.  He does not relate any pain or redness with it it is mainly just swelling.  It's bothering him that it has not gone away in the intervening days.  He is unsure if an ultrasound of his legs was done when he was at the [facility].  There does not appear to be any evidence of ultrasound having been done at [facility].    Review of Systems   Constitutional: Negative for fever and unexpected weight change.   HENT: Negative for hearing loss, postnasal drip and rhinorrhea.    Eyes: Negative for pain and visual disturbance.   Respiratory: Negative for cough, shortness of breath and wheezing.    Cardiovascular: Positive for leg swelling. Negative for chest pain and palpitations.   Gastrointestinal: Negative for constipation, diarrhea, nausea and vomiting.   Genitourinary: Negative for dysuria and hematuria.   Musculoskeletal: Positive for arthralgias. Negative for back pain, myalgias and neck stiffness.   Skin: Negative for pallor and rash.   Neurological: Negative for seizures, syncope and headaches.   Hematological: Negative for adenopathy.   Psychiatric/Behavioral: Negative for dysphoric mood. The patient is not nervous/anxious.        Objective:   /70   Pulse 86   Temp 97.4 °F (36.3 °C) (Tympanic)   Ht 6' 3" (1.905 m)   Wt 99.6 kg (219 lb 9.3 oz)   BMI 27.45 kg/m²      Physical Exam   Constitutional: He is oriented to person, place, and time. He appears well-developed and well-nourished. No distress.   HENT:   Head: Normocephalic and atraumatic.   Mouth/Throat: Oropharynx is clear and moist.   Eyes: EOM are normal. Pupils are equal, round, and reactive to light.   Neck: Normal range of motion. Neck supple. No JVD present. No thyromegaly present.   Cardiovascular: Normal rate, regular rhythm, normal heart sounds and intact distal pulses.  Exam reveals no gallop and no friction rub.    No murmur heard.  Pulmonary/Chest: Effort normal and " breath sounds normal. He has no wheezes. He has no rales.   Abdominal: Soft. Bowel sounds are normal. He exhibits no distension. There is no tenderness. There is no rebound and no guarding.   Musculoskeletal: Normal range of motion. He exhibits no edema.   Left upper extremity is bandaged.    2+ edema in the lower extremities bilaterally.   Lymphadenopathy:     He has no cervical adenopathy.   Neurological: He is alert and oriented to person, place, and time. He has normal reflexes. No cranial nerve deficit.   Skin: Skin is warm and dry. No rash noted.   Psychiatric: He has a normal mood and affect. Judgment normal.   Vitals reviewed.      Admission on 10/27/2017, Discharged on 10/27/2017   Component Date Value    POCT Glucose 10/27/2017 108     POCT Glucose 10/27/2017 101    Lab Visit on 10/26/2017   Component Date Value    WBC 10/27/2017 6.85     RBC 10/27/2017 3.91*    Hemoglobin 10/27/2017 10.9*    Hematocrit 10/27/2017 34.2*    MCV 10/27/2017 88     MCH 10/27/2017 27.9     MCHC 10/27/2017 31.9*    RDW 10/27/2017 14.4     Platelets 10/27/2017 487*    MPV 10/27/2017 10.0     Immature Granulocytes 10/27/2017 0.9*    Gran # 10/27/2017 4.2     Immature Grans (Abs) 10/27/2017 0.06*    Lymph # 10/27/2017 1.7     Mono # 10/27/2017 0.8     Eos # 10/27/2017 0.2     Baso # 10/27/2017 0.05     nRBC 10/27/2017 0     Gran% 10/27/2017 60.6     Lymph% 10/27/2017 24.4     Mono% 10/27/2017 11.1     Eosinophil% 10/27/2017 2.3     Basophil% 10/27/2017 0.7     Differential Method 10/27/2017 Automated     Sodium 10/27/2017 140     Potassium 10/27/2017 4.6     Chloride 10/27/2017 103     CO2 10/27/2017 27     Glucose 10/27/2017 85     BUN, Bld 10/27/2017 21     Creatinine 10/27/2017 1.6*    Calcium 10/27/2017 9.6     Total Protein 10/27/2017 6.8     Albumin 10/27/2017 2.7*    Total Bilirubin 10/27/2017 0.4     Alkaline Phosphatase 10/27/2017 90     AST 10/27/2017 21     ALT 10/27/2017 26      Anion Gap 10/27/2017 10     eGFR if  10/27/2017 48.3*    eGFR if non  Amer* 10/27/2017 41.8*       Assessment:       1. Status post hip replacement, left    2. Olecranon bursitis of left elbow    3. Localized edema    4. Hypertension associated with diabetes    5. Acute blood loss anemia        Plan:   Hypertension associated with diabetes  Blood pressure is under good control.  We will continue the current regimen.  Will work on regular aerobic exercise and a low salt diet.        [place] was seen today for follow-up.    Diagnoses and all orders for this visit:    Status post hip replacement, left  Comments:  Completed rehab.  COntinue home exercises and follow up with Dr. Alcocer    Olecranon bursitis of left elbow  Comments:  Had surgery from Dr. Alcocer.  Following his plan for treatment. No PT at this time.  Finish antibiotics.    Localized edema  Comments:  get ultrasound to rule out dvt.  If no dvt will give brief diuresis to eliminate fluid.  Orders:  -     US Lower Extremity Veins Bilateral; Future    Hypertension associated with diabetes  -     Basic metabolic panel; Future    Acute blood loss anemia  -     CBC auto differential; Future        Return in about 2 weeks (around 11/16/2017) for Surgery follow up.

## 2017-11-03 RX ORDER — FUROSEMIDE 20 MG/1
20 TABLET ORAL DAILY
Qty: 3 TABLET | Refills: 0 | Status: SHIPPED | OUTPATIENT
Start: 2017-11-03 | End: 2017-11-14

## 2017-11-03 RX ORDER — FUROSEMIDE 20 MG/1
20 TABLET ORAL DAILY
Qty: 3 TABLET | Refills: 0 | Status: SHIPPED | OUTPATIENT
Start: 2017-11-03 | End: 2017-11-03 | Stop reason: SDUPTHER

## 2017-11-03 NOTE — TELEPHONE ENCOUNTER
CALLED PATIENT AND DISCUSSED DR. LAWRENCE'S MESSAGE. WENT OVER SEVERAL TIMES TO MAKE SURE HE UNDERSTOOD AND NEW HOW TO TAKE MEDICINES AND WHAT THEY WERE ORDERED FOR. PATIENT WAS ABLE TO REPEAT DIRECTIONS AND VOICED UNDERSTANDING. SCHEDULED PATIENT FOR 2 MONTH F/U LAB.

## 2017-11-03 NOTE — TELEPHONE ENCOUNTER
Ultrasound shows no blood clot.  Labs look okay.  Anemia is present but not severe.    For the anemia, start over the counter iron replacement daily.  Repeat cbc in 2 months.    FOr the swelling we will do some lasix 20 mg daily for three days.

## 2017-11-07 ENCOUNTER — OFFICE VISIT (OUTPATIENT)
Dept: ORTHOPEDICS | Facility: CLINIC | Age: 77
End: 2017-11-07
Payer: MEDICARE

## 2017-11-07 ENCOUNTER — TELEPHONE (OUTPATIENT)
Dept: ORTHOPEDICS | Facility: CLINIC | Age: 77
End: 2017-11-07

## 2017-11-07 VITALS
HEART RATE: 80 BPM | SYSTOLIC BLOOD PRESSURE: 109 MMHG | DIASTOLIC BLOOD PRESSURE: 59 MMHG | RESPIRATION RATE: 12 BRPM | TEMPERATURE: 97 F

## 2017-11-07 DIAGNOSIS — M70.22 OLECRANON BURSITIS OF LEFT ELBOW: Primary | ICD-10-CM

## 2017-11-07 DIAGNOSIS — Z09 POSTOP CHECK: ICD-10-CM

## 2017-11-07 PROCEDURE — 99999 PR PBB SHADOW E&M-EST. PATIENT-LVL IV: CPT | Mod: PBBFAC,,, | Performed by: PHYSICIAN ASSISTANT

## 2017-11-07 PROCEDURE — 99024 POSTOP FOLLOW-UP VISIT: CPT | Mod: S$GLB,,, | Performed by: PHYSICIAN ASSISTANT

## 2017-11-07 NOTE — TELEPHONE ENCOUNTER
Called to rescheduled pt post op appt due to  going to surgery. Pt was unavailable. Left message for pt to call at earliest convenience.

## 2017-11-07 NOTE — PROGRESS NOTES
Patient ID: Srinath Mcknight is a 76 y.o. male.    Chief Complaint: Post-op Evaluation of the Left Elbow      HPI: Srinath Mcknight  is a 76 y.o. male who c/o Post-op Evaluation of the Left Elbow   for duration of about 11 days.  He had of left elbow olecranon bursectomy on 10/27/17 by Dr. Alcocer.  He also had a left total hip arthroplasty done by Dr. Alcocer on 10/9/17.  He comes in today for his first postoperative evaluation of the left elbow olecranon bursectomy.  Denies fevers.  He has outpatient physical therapy set up to start next week.  Pain level today in the elbow is 1 out of 10.  He does still complain of some swelling.  He also complains of associated stiffness.  Quality is aching.  Alleviating factors rest.  Aggravating factors include full extension and full active flexion.  He tells me he has completed the antibiotic prescribed to him upon discharge from the olecranon bursectomy.  He is also concerned about bilateral lower extremity swelling.  He had an ultrasound done last week which was negative per his report.  On to tell me that his primary care doctor gave him 3 fluid pills which did nothing to help alleviate the swelling.        Objective:           Left Elbow Exam     Comments:  Left elbow olecranon bursectomy incision site is clean, dry, intact.  No erythema.  No purulence.  No sign or symptom of infection.  Compartments are soft.  Capillary refill less than 2 seconds.  2+ radial pulse.  He lacks full extension by approximately 5-10°.  He lacks old flexion as well.  He has flexion to approximately 100° here in the office today.  He is neurovascularly intact.        Assessment:       Encounter Diagnoses   Name Primary?    Olecranon bursitis of left elbow Yes    Postop check           Plan:       [place] was seen today for post-op evaluation.    Diagnoses and all orders for this visit:    Olecranon bursitis of left elbow    Postop check    Mr. Mcknight comes in today to follow-up on his left  olecranon bursectomy.  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  He has completed his antibiotic.  If he develops fevers, purulent drainage, erythema, or increasing pain, he should notify the office immediately.  In regard to the bilateral lower extremity swelling, he did have an ultrasound on 11/2/17, which was negative for DVT.  I recommend he follow-up with his cardiologist for further workup of the bilateral lower extremity swelling as well as further treatment.  He will keep his appointment with Dr. Alcocer as scheduled in mid December.  Again, if he has problems before then, he will notify the office.  I have also submitted orders for air P to work on elbow range of motion and strengthening.  We clean the incision with Betadine and alcohol today.  Sutures removed and Steri-Strips were applied across the incision.  He can wash it with clean running water and antibacterial soap.  He should not submerge it in water at this time.  The suture strips should remain in place for 2 weeks.  Patient verbalizes understanding and agrees.  Return in about 1 month (around 12/7/2017) for f/u with Dr. Alcocer as scheduled..          The patient understands, chooses and consents to this plan and accepts all   the risks which include but are not limited to the risks mentioned above.     Disclaimer: This note was prepared using a voice recognition system and is likely to have sound alike errors within the text.

## 2017-11-08 ENCOUNTER — TELEPHONE (OUTPATIENT)
Dept: CARDIOLOGY | Facility: CLINIC | Age: 77
End: 2017-11-08

## 2017-11-08 NOTE — TELEPHONE ENCOUNTER
----- Message from Elver Cobos MD sent at 11/8/2017  9:42 AM CST -----  Regarding: RE: Earlier appt for eval BLE Edema  WE WILL CONTACT PT AND SET UP APPT.  ----- Message -----  From: Aldo Cedeño MA  Sent: 11/8/2017   9:20 AM  To: Elver Cobos MD, #  Subject: Earlier appt for eval BLE Edema                  Good Morning.     An THAKKAR saw this patient in Orthopedics yesterday.     He does not have an appt for follow up until 12/12/17.     She wanted to see if he could be seen sooner due to bilateral lower extremity edema. He is post op hip (YAHIR) with negative ultrasound on 11/02/17.       Sincerely,   [facility]

## 2017-11-08 NOTE — TELEPHONE ENCOUNTER
Called appt.    Appt scheduled for 11/14/2017 same day as Urology appt.    Patient offered earlier appt w. [facility] or provider. Patient refused.

## 2017-11-09 VITALS
HEART RATE: 79 BPM | BODY MASS INDEX: 26.7 KG/M2 | DIASTOLIC BLOOD PRESSURE: 83 MMHG | WEIGHT: 214.75 LBS | RESPIRATION RATE: 12 BRPM | SYSTOLIC BLOOD PRESSURE: 179 MMHG | OXYGEN SATURATION: 97 % | HEIGHT: 75 IN | TEMPERATURE: 98 F

## 2017-11-13 ENCOUNTER — OFFICE VISIT (OUTPATIENT)
Dept: UROLOGY | Facility: CLINIC | Age: 77
End: 2017-11-13
Payer: MEDICARE

## 2017-11-13 VITALS
WEIGHT: 220 LBS | SYSTOLIC BLOOD PRESSURE: 121 MMHG | BODY MASS INDEX: 27.5 KG/M2 | DIASTOLIC BLOOD PRESSURE: 64 MMHG | HEART RATE: 89 BPM

## 2017-11-13 DIAGNOSIS — R31.9 HEMATURIA, UNSPECIFIED TYPE: Primary | ICD-10-CM

## 2017-11-13 LAB
BILIRUB SERPL-MCNC: NORMAL MG/DL
BLOOD URINE, POC: NORMAL
COLOR, POC UA: NORMAL
GLUCOSE UR QL STRIP: NORMAL
KETONES UR QL STRIP: NORMAL
LEUKOCYTE ESTERASE URINE, POC: NORMAL
NITRITE, POC UA: NORMAL
PH, POC UA: 5
PROTEIN, POC: NORMAL
SPECIFIC GRAVITY, POC UA: 1.01
UROBILINOGEN, POC UA: NORMAL

## 2017-11-13 PROCEDURE — 99214 OFFICE O/P EST MOD 30 MIN: CPT | Mod: 25,S$GLB,, | Performed by: UROLOGY

## 2017-11-13 PROCEDURE — 99999 PR PBB SHADOW E&M-EST. PATIENT-LVL II: CPT | Mod: PBBFAC,,, | Performed by: UROLOGY

## 2017-11-13 PROCEDURE — 81002 URINALYSIS NONAUTO W/O SCOPE: CPT | Mod: S$GLB,,, | Performed by: UROLOGY

## 2017-11-13 NOTE — PROGRESS NOTES
Chief Complaint: Gross Hematuria    HPI:   11/13/17: 77 yo man this summer saw gross hematuria; it stopped after a few days.  Has had sudden urgency just since that time.  No abd/pelvic pain and no exac/rel factors.  No other hematuria.  No urolithiasis.  No urinary bother.  Had urgency/incontinence and saw a urologist for that 2+ years ago and it got better.  US 8/17 showed no stones.    Allergies:  Sulfa (sulfonamide antibiotics)    Medications:  has a current medication list which includes the following prescription(s): acetaminophen, apixaban, blood sugar diagnostic, clindamycin, clonazepam, docusate sodium, doxycycline, gabapentin, glipizide, lancets, losartan-hydrochlorothiazide 50-12.5 mg, metformin, metoprolol succinate, multivitamin, oxycodone-acetaminophen, potassium chloride sa, pramipexole, pravastatin, senna, sildenafil, and furosemide.    Review of Systems:  General: No fever, chills, fatigability, or weight loss.  Skin: No rashes, itching, or changes in color or texture of skin.  Chest: Denies DAMON, cyanosis, wheezing, cough, and sputum production.  Abdomen: Appetite fine. No weight loss. Denies diarrhea, abdominal pain, hematemesis, or blood in stool.  Musculoskeletal: No joint stiffness or swelling. Denies back pain.  : As above.  All other review of systems negative.    PMH:   has a past medical history of Anemia due to unknown mechanism (11/10/2015); Angina pectoris (2014); Arthritis; Back pain; Coronary artery disease; Diabetes mellitus (20 years); Diabetes mellitus type II (1994); DM (diabetes mellitus) (1994); Hyperlipemia; Hypertension; CHARLES (obstructive sleep apnea); Spinal cord disease; Trouble in sleeping; Type 2 diabetes mellitus with diabetic polyneuropathy, without long-term current use of insulin; and Uses hearing aid.    PSH:   has a past surgical history that includes Rotator cuff repair (Left, 2006); Shoulder arthroscopy w/ rotator cuff repair (Right, 2009); Laminectomy (07/22/2016);  Hernia repair (Bilateral, 04/18/2017); Joint replacement (Left, 10/09/2017); and Back surgery.    FamHx: family history includes Cancer (age of onset: 50) in his brother; Diabetes in his father, mother, and sister; Drug abuse in his brother; Heart disease in his father and mother; Hypertension in his father and mother; Stroke in his father.    SocHx:  reports that he has never smoked. He has never used smokeless tobacco. He reports that he drinks about 0.6 oz of alcohol per week . He reports that he does not use drugs.      Physical Exam:  Vitals:    11/13/17 1313   BP: 121/64   Pulse: 89     General: A&Ox3, no apparent distress, no deformities  Neck: No masses, normal thyroid  Lungs: normal inspiration, no use of accessory muscles  Heart: normal pulse, no arrhythmias  Abdomen: Soft, NT, ND, no masses, no hernias, no hepatosplenomegaly  Lymphatic: Neck and groin nodes negative  Skin: The skin is warm and dry. No jaundice.  Ext: No c/c/e.  : Test desc digna, no abnormalities of epididymus. Penis normal, with normal penile and scrotal skin. Meatus normal. Normal rectal tone, no hemorrhoids. Prost 30 gm no nodules or masses appreciated. SV not palpable. Perineum and anus normal.    Labs/Studies:   Urinalysis performed in clinic, summary: UA normal  PSA    3/17: 0.53    Impression/Plan:   1. Hematuria workup indicated.  CT Urogram and RTC cysto.

## 2017-11-14 ENCOUNTER — OFFICE VISIT (OUTPATIENT)
Dept: CARDIOLOGY | Facility: CLINIC | Age: 77
End: 2017-11-14
Payer: MEDICARE

## 2017-11-14 VITALS
SYSTOLIC BLOOD PRESSURE: 100 MMHG | HEART RATE: 76 BPM | DIASTOLIC BLOOD PRESSURE: 50 MMHG | BODY MASS INDEX: 26.58 KG/M2 | WEIGHT: 213.81 LBS | HEIGHT: 75 IN

## 2017-11-14 DIAGNOSIS — I25.10 CORONARY ARTERY DISEASE, OCCLUSIVE: Primary | ICD-10-CM

## 2017-11-14 DIAGNOSIS — I70.0 AORTIC CALCIFICATION: Chronic | ICD-10-CM

## 2017-11-14 DIAGNOSIS — E11.59 HYPERTENSION ASSOCIATED WITH DIABETES: Chronic | ICD-10-CM

## 2017-11-14 DIAGNOSIS — I15.2 HYPERTENSION ASSOCIATED WITH DIABETES: Chronic | ICD-10-CM

## 2017-11-14 PROCEDURE — 99999 PR PBB SHADOW E&M-EST. PATIENT-LVL III: CPT | Mod: PBBFAC,,, | Performed by: NUCLEAR MEDICINE

## 2017-11-14 PROCEDURE — 99214 OFFICE O/P EST MOD 30 MIN: CPT | Mod: S$GLB,,, | Performed by: NUCLEAR MEDICINE

## 2017-11-14 PROCEDURE — 99499 UNLISTED E&M SERVICE: CPT | Mod: S$GLB,,, | Performed by: NUCLEAR MEDICINE

## 2017-11-14 NOTE — PROGRESS NOTES
Subjective:   Patient ID:  Srinath Mcknight is a 76 y.o. male who presents for follow-up of Coronary Artery Disease (6 month followup) and Edema    HPI  1- CHRONIC CAD - ANGINA FC 2-  ATHEROSCLEROSIS OF AA.  2- ESSENTIAL HTN  RECOVERING FROM LEFT HIP SURGERY  HAVING BILATERAL FEET EDEMA.  NEGATIVE WORK UP FOR VTE - VENOUS US  NO RECURRENT ANGINA OR  EQUIVALENT  NO ORTHOPNEA OR PND  NO PALPITATIONS  NO SYNCOPE OR NEAR SYNCOPE  NO CALVE TENDERNESS  NO FOCAL CNS SYMPTOMS OR SIGNS TO SUGGEST TIA OR STROKE    Review of Systems   Constitution: Negative for chills, fever, weakness, night sweats, weight gain and weight loss.   HENT: Negative for nosebleeds.    Eyes: Negative for blurred vision, double vision and visual disturbance.   Cardiovascular: Positive for leg swelling. Negative for chest pain, dyspnea on exertion, irregular heartbeat, orthopnea, palpitations, paroxysmal nocturnal dyspnea and syncope.   Respiratory: Negative for cough, hemoptysis and wheezing.    Endocrine: Negative for polydipsia and polyuria.   Hematologic/Lymphatic: Does not bruise/bleed easily.   Skin: Negative for rash.   Musculoskeletal: Negative for joint pain, joint swelling, muscle weakness and myalgias.   Gastrointestinal: Negative for abdominal pain, hematemesis, jaundice and melena.   Genitourinary: Negative for dysuria, hematuria and nocturia.   Neurological: Negative for dizziness, focal weakness, headaches and sensory change.   Psychiatric/Behavioral: Negative for depression. The patient does not have insomnia and is not nervous/anxious.      Family History   Problem Relation Age of Onset    Hypertension Mother     Heart disease Mother     Diabetes Mother     Hypertension Father     Heart disease Father     Diabetes Father     Stroke Father     Diabetes Sister     Cancer Brother 50     pancreas    Drug abuse Brother     Prostate cancer Neg Hx     Macular degeneration Neg Hx     Retinal detachment Neg Hx     Strabismus Neg  Hx     Glaucoma Neg Hx     Blindness Neg Hx     Amblyopia Neg Hx     Kidney disease Neg Hx     Mental illness Neg Hx     Mental retardation Neg Hx     COPD Neg Hx     Asthma Neg Hx      Past Medical History:   Diagnosis Date    Anemia due to unknown mechanism 11/10/2015    Angina pectoris 2014    not since    Arthritis     Back pain     Coronary artery disease     Diabetes mellitus 20 years     am 09/29/2014    Diabetes mellitus type II 1994     am 12/22/2015    DM (diabetes mellitus) 1994    BS 80 am 01/10/2017    Hyperlipemia     Hypertension     CHARLES (obstructive sleep apnea)     Spinal cord disease     Trouble in sleeping     Type 2 diabetes mellitus with diabetic polyneuropathy, without long-term current use of insulin     Uses hearing aid     bilat     Current Outpatient Prescriptions on File Prior to Visit   Medication Sig Dispense Refill    acetaminophen (TYLENOL) 650 MG TbSR Take 1,300 mg by mouth every 8 (eight) hours.      apixaban (ELIQUIS) 2.5 mg Tab Take 1 tablet (2.5 mg total) by mouth 2 (two) times daily. 80 tablet 0    blood sugar diagnostic (ACCU-CHEK FRANKO PLUS TEST STRP) Strp USE  1  STRIP ONE TIME DAILY 100 strip 4    clonazePAM (KLONOPIN) 0.25 MG TbDL DISSOLVE 2 TABLETS BY MOUTH NIGHTLY 90 tablet 0    docusate sodium (COLACE) 100 MG capsule Take 1 capsule (100 mg total) by mouth 2 (two) times daily as needed. 60 capsule 0    doxycycline (MONODOX) 100 MG capsule Take 1 capsule (100 mg total) by mouth 2 (two) times daily. 40 capsule 0    gabapentin (NEURONTIN) 100 MG capsule TAKE 1 CAPSULE BY MOUTH EVERY NIGHT AT BEDTIME FOR 1 WEEK, THEN INCREASE TO 2 CAPSULES EVERY NIGHT AT BEDTIME THEREAFTER 180 capsule 1    glipiZIDE (GLUCOTROL) 5 MG tablet TAKE 2 TABLETS (10 MG) TWO TIMES DAILY BEFORE MEALS 360 tablet 4    lancets (ACCU-CHEK MULTICLIX LANCET) Misc TEST ONE TIME DAILY 100 each 3    losartan-hydrochlorothiazide 50-12.5 mg (HYZAAR) 50-12.5 mg per  tablet TAKE 1 TABLET ONE TIME DAILY 90 tablet 4    metformin (GLUCOPHAGE) 1000 MG tablet TAKE 1 TABLET TWICE DAILY WITH MEALS 180 tablet 4    metoprolol succinate (TOPROL-XL) 50 MG 24 hr tablet TAKE 1 TABLET EVERY DAY 90 tablet 3    multivitamin (ONE DAILY MULTIVITAMIN) per tablet Take 1 tablet by mouth once daily.      oxyCODONE-acetaminophen (PERCOCET)  mg per tablet Take 1 tablet by mouth every 4 (four) hours as needed. 60 tablet 0    potassium chloride SA (K-DUR,KLOR-CON) 20 MEQ tablet       pramipexole (MIRAPEX) 0.25 MG tablet TAKE 1 TABLET BY MOUTH EVERY EVENING 30 tablet 5    pravastatin (PRAVACHOL) 40 MG tablet TAKE 1 TABLET EVERY DAY (Patient taking differently: TAKE 1 TABLET EVERY NIGHT) 90 tablet 4    senna (SENNA CONCENTRATE) 8.6 mg tablet Take 1 tablet by mouth once daily. 60 tablet 0    sildenafil (VIAGRA) 100 MG tablet Take 1 tablet (100 mg total) by mouth as needed. Take on an empty stomach 6 tablet 11    [DISCONTINUED] clindamycin (CLEOCIN) 300 MG capsule       [DISCONTINUED] furosemide (LASIX) 20 MG tablet Take 1 tablet (20 mg total) by mouth once daily. 3 tablet 0     No current facility-administered medications on file prior to visit.      Review of patient's allergies indicates:   Allergen Reactions    Sulfa (sulfonamide antibiotics)      Can't recall from 1995       Objective:     Physical Exam   Constitutional: He is oriented to person, place, and time. He appears well-developed. No distress.   HENT:   Head: Normocephalic.   Eyes: Conjunctivae are normal. Pupils are equal, round, and reactive to light.   Neck: Neck supple. No JVD present. No thyromegaly present.   Cardiovascular: Normal rate, regular rhythm, normal heart sounds and intact distal pulses.  Exam reveals no gallop and no friction rub.    No murmur heard.  Pulses:       Carotid pulses are 2+ on the right side, and 2+ on the left side.       Radial pulses are 2+ on the right side, and 2+ on the left side.         Femoral pulses are 2+ on the right side, and 2+ on the left side.       Popliteal pulses are 2+ on the right side, and 2+ on the left side.        Dorsalis pedis pulses are 2+ on the right side, and 2+ on the left side.        Posterior tibial pulses are 2+ on the right side, and 2+ on the left side.   Pulmonary/Chest: Breath sounds normal. He has no wheezes. He has no rales. He exhibits no tenderness.   Abdominal: Soft. Bowel sounds are normal. He exhibits no mass. There is no hepatosplenomegaly. There is no tenderness.   Musculoskeletal: He exhibits edema. He exhibits no tenderness.        Cervical back: Normal.        Thoracic back: Normal.        Lumbar back: Normal.   TRACE EDEMA OF ANKLES AND FEET.   Lymphadenopathy:     He has no cervical adenopathy.     He has no axillary adenopathy.        Right: No supraclavicular adenopathy present.        Left: No supraclavicular adenopathy present.   Neurological: He is alert and oriented to person, place, and time. He has normal strength. No sensory deficit. Gait normal.   Skin: Skin is warm. No cyanosis. No pallor. Nails show no clubbing.   Psychiatric: He has a normal mood and affect. His speech is normal and behavior is normal. Cognition and memory are normal.       Assessment:     1. Coronary artery disease, occlusive    2. Aortic calcification    3. Hypertension associated with diabetes      NO CLINICAL EVIDENCE OF ACUTE HF  STABLE CAD  CONTROLLED BP  CNS STATUS STABLE  Plan:     Coronary artery disease, occlusive    Aortic calcification    Hypertension associated with diabetes      1- CONTINUE PRESENT CARD MANAGEMENT    2- RETURN IN 6 MONTHS.    2/1/18-  ADDENDUM  NOTE    1- STABLE CV STATUS- NO EVIDENCE OF ACTIVE MYOCARDIAL ISCHEMIA. NO  ACUTE HF. NO ARRHYTHMIAS    2- NO CV CONTRAINDICATIONS FOR NEURO SURGICAL INTERVENTION- LUMBAR LAMINECTOMY - POSSIBLE COFLEX-  AND ANESTHESIA    LOW RISK FOR PERIOPERATIVE CV EVENTS    3- GIVE DOSE OF TOPROL  IN AM  BEFORE  SURGERY

## 2017-11-20 ENCOUNTER — HOSPITAL ENCOUNTER (OUTPATIENT)
Dept: RADIOLOGY | Facility: HOSPITAL | Age: 77
Discharge: HOME | End: 2017-11-20
Attending: UROLOGY
Payer: MEDICARE

## 2017-11-20 DIAGNOSIS — R31.9 HEMATURIA, UNSPECIFIED TYPE: ICD-10-CM

## 2017-11-20 PROCEDURE — 25500020 PHARM REV CODE 255: Performed by: UROLOGY

## 2017-11-20 PROCEDURE — 74178 CT ABD&PLV WO CNTR FLWD CNTR: CPT | Mod: TC

## 2017-11-20 RX ADMIN — IOHEXOL 75 ML: 350 INJECTION, SOLUTION INTRAVENOUS at 12:11

## 2017-12-04 ENCOUNTER — OFFICE VISIT (OUTPATIENT)
Dept: UROLOGY | Facility: CLINIC | Age: 77
End: 2017-12-04
Payer: MEDICARE

## 2017-12-04 VITALS — WEIGHT: 213 LBS | BODY MASS INDEX: 26.62 KG/M2

## 2017-12-04 DIAGNOSIS — Z12.5 PROSTATE CANCER SCREENING: ICD-10-CM

## 2017-12-04 DIAGNOSIS — R31.9 HEMATURIA, UNSPECIFIED TYPE: Primary | ICD-10-CM

## 2017-12-04 PROCEDURE — 99999 PR PBB SHADOW E&M-EST. PATIENT-LVL II: CPT | Mod: PBBFAC,,, | Performed by: UROLOGY

## 2017-12-04 PROCEDURE — 99499 UNLISTED E&M SERVICE: CPT | Mod: S$GLB,,, | Performed by: UROLOGY

## 2017-12-04 PROCEDURE — 52000 CYSTOURETHROSCOPY: CPT | Mod: S$GLB,,, | Performed by: UROLOGY

## 2017-12-04 PROCEDURE — 81002 URINALYSIS NONAUTO W/O SCOPE: CPT | Mod: S$GLB,,, | Performed by: UROLOGY

## 2017-12-04 NOTE — PROGRESS NOTES
Chief Complaint: Gross Hematuria    HPI:   12/4/17: CT Urogram reassuring.  Cysto today normal.  11/13/17: 77 yo man this summer saw gross hematuria; it stopped after a few days.  Has had sudden urgency just since that time.  No abd/pelvic pain and no exac/rel factors.  No other hematuria.  No urolithiasis.  No urinary bother.  Had urgency/incontinence and saw a urologist for that 2+ years ago and it got better.  US 8/17 showed no stones.    Allergies:  Sulfa (sulfonamide antibiotics)    Medications:  has a current medication list which includes the following prescription(s): acetaminophen, apixaban, blood sugar diagnostic, clonazepam, docusate sodium, doxycycline, gabapentin, glipizide, lancets, losartan-hydrochlorothiazide 50-12.5 mg, metformin, metoprolol succinate, multivitamin, oxycodone-acetaminophen, potassium chloride sa, pramipexole, pravastatin, senna, and sildenafil.    Review of Systems:  General: No fever, chills, fatigability, or weight loss.  Skin: No rashes, itching, or changes in color or texture of skin.  Chest: Denies DAMON, cyanosis, wheezing, cough, and sputum production.  Abdomen: Appetite fine. No weight loss. Denies diarrhea, abdominal pain, hematemesis, or blood in stool.  Musculoskeletal: No joint stiffness or swelling. Denies back pain.  : As above.  All other review of systems negative.    PMH:   has a past medical history of Anemia due to unknown mechanism (11/10/2015); Angina pectoris (2014); Arthritis; Back pain; Coronary artery disease; Diabetes mellitus (20 years); Diabetes mellitus type II (1994); DM (diabetes mellitus) (1994); Hyperlipemia; Hypertension; CHARLES (obstructive sleep apnea); Spinal cord disease; Trouble in sleeping; Type 2 diabetes mellitus with diabetic polyneuropathy, without long-term current use of insulin; and Uses hearing aid.    PSH:   has a past surgical history that includes Rotator cuff repair (Left, 2006); Shoulder arthroscopy w/ rotator cuff repair (Right,  2009); Laminectomy (07/22/2016); Hernia repair (Bilateral, 04/18/2017); Joint replacement (Left, 10/09/2017); and Back surgery.    FamHx: family history includes Cancer (age of onset: 50) in his brother; Diabetes in his father, mother, and sister; Drug abuse in his brother; Heart disease in his father and mother; Hypertension in his father and mother; Stroke in his father.    SocHx:  reports that he has never smoked. He has never used smokeless tobacco. He reports that he drinks about 0.6 oz of alcohol per week . He reports that he does not use drugs.      Physical Exam:  There were no vitals filed for this visit.  General: A&Ox3, no apparent distress, no deformities  Neck: No masses, normal thyroid  Lungs: normal inspiration, no use of accessory muscles  Heart: normal pulse, no arrhythmias  Abdomen: Soft, NT, ND  Skin: The skin is warm and dry. No jaundice.  Ext: No c/c/e.  :   11/17: Test desc digna, no abnormalities of epididymus. Penis normal, with normal penile and scrotal skin. Meatus normal. Normal rectal tone, no hemorrhoids. Prost 30 gm no nodules or masses appreciated. SV not palpable. Perineum and anus normal.    Labs/Studies:   Urinalysis performed in clinic, summary: UA normal  PSA    3/17: 0.53    Procedure: Diagnostic Cystoscopy    Procedure in Detail: After proper consents were obtained, the patient was prepped and draped in normal sterile fashion for diagnostic cystoscopy. 5 ml of lidocaine jelly was instilled in the urethra. The flexible cystoscope was then introduced into the urethra, and advanced into the bladder under direct vision. The urethral mucosa appeared normal, and no strictures were noted. The sphincter appeared to be normal, and the veru montanum was unremarkable. The prostatic mucosa and the lateral lobes of the prostate were mildly opposed. The bladder neck was normal. Inspection of the interior of the bladder was then carried out. The trigone was unremarkable, with no mucosal  lesions. The ureteral orifices were normal in position and configuration. Systematic inspection of the mucosa of the bladder it was then carried out, rotating the cystoscope so that all areas of the left and right lateral walls, the dome of the bladder, and the posterior wall were all visualized. The cystoscope was then advanced further into the bladder, and maximum deflection of the scope was performed so that the bladder neck could be inspected. No mucosal lesions were noted there. The cystoscope was then removed, and the procedure terminated.     Findings: normal cysto    Impression/Plan:   1. Hematuria workup negative.  US/RTC 1 year.

## 2017-12-05 ENCOUNTER — OFFICE VISIT (OUTPATIENT)
Dept: INTERNAL MEDICINE | Facility: CLINIC | Age: 77
End: 2017-12-05
Payer: MEDICARE

## 2017-12-05 ENCOUNTER — LAB VISIT (OUTPATIENT)
Dept: LAB | Facility: HOSPITAL | Age: 77
End: 2017-12-05
Attending: PHYSICIAN ASSISTANT
Payer: MEDICARE

## 2017-12-05 VITALS
HEIGHT: 75 IN | BODY MASS INDEX: 26.42 KG/M2 | SYSTOLIC BLOOD PRESSURE: 128 MMHG | WEIGHT: 212.5 LBS | TEMPERATURE: 98 F | DIASTOLIC BLOOD PRESSURE: 64 MMHG | HEART RATE: 80 BPM

## 2017-12-05 DIAGNOSIS — E11.9 CONTROLLED TYPE 2 DIABETES MELLITUS WITHOUT COMPLICATION, WITHOUT LONG-TERM CURRENT USE OF INSULIN: Primary | ICD-10-CM

## 2017-12-05 DIAGNOSIS — M10.071 ACUTE IDIOPATHIC GOUT INVOLVING TOE OF RIGHT FOOT: ICD-10-CM

## 2017-12-05 DIAGNOSIS — D64.9 ANEMIA, UNSPECIFIED TYPE: ICD-10-CM

## 2017-12-05 DIAGNOSIS — Z98.890 POST-OPERATIVE STATE: ICD-10-CM

## 2017-12-05 LAB
BASOPHILS # BLD AUTO: 0.04 K/UL
BASOPHILS NFR BLD: 0.8 %
DIFFERENTIAL METHOD: ABNORMAL
EOSINOPHIL # BLD AUTO: 0.2 K/UL
EOSINOPHIL NFR BLD: 4.2 %
ERYTHROCYTE [DISTWIDTH] IN BLOOD BY AUTOMATED COUNT: 14.7 %
HCT VFR BLD AUTO: 34 %
HGB BLD-MCNC: 11 G/DL
IMM GRANULOCYTES # BLD AUTO: 0.01 K/UL
IMM GRANULOCYTES NFR BLD AUTO: 0.2 %
LYMPHOCYTES # BLD AUTO: 1.6 K/UL
LYMPHOCYTES NFR BLD: 31.2 %
MCH RBC QN AUTO: 27.5 PG
MCHC RBC AUTO-ENTMCNC: 32.4 G/DL
MCV RBC AUTO: 85 FL
MONOCYTES # BLD AUTO: 0.5 K/UL
MONOCYTES NFR BLD: 10 %
NEUTROPHILS # BLD AUTO: 2.8 K/UL
NEUTROPHILS NFR BLD: 53.6 %
NRBC BLD-RTO: 0 /100 WBC
PLATELET # BLD AUTO: 210 K/UL
PMV BLD AUTO: 10.2 FL
RBC # BLD AUTO: 4 M/UL
WBC # BLD AUTO: 5.22 K/UL

## 2017-12-05 PROCEDURE — 36415 COLL VENOUS BLD VENIPUNCTURE: CPT | Mod: PO

## 2017-12-05 PROCEDURE — 85025 COMPLETE CBC W/AUTO DIFF WBC: CPT

## 2017-12-05 PROCEDURE — 99999 PR PBB SHADOW E&M-EST. PATIENT-LVL IV: CPT | Mod: PBBFAC,,, | Performed by: PHYSICIAN ASSISTANT

## 2017-12-05 PROCEDURE — 99499 UNLISTED E&M SERVICE: CPT | Mod: S$GLB,,, | Performed by: PHYSICIAN ASSISTANT

## 2017-12-05 PROCEDURE — 99213 OFFICE O/P EST LOW 20 MIN: CPT | Mod: S$GLB,,, | Performed by: PHYSICIAN ASSISTANT

## 2017-12-05 RX ORDER — METOPROLOL SUCCINATE 50 MG/1
TABLET, EXTENDED RELEASE ORAL
Qty: 90 TABLET | Refills: 3 | Status: SHIPPED | OUTPATIENT
Start: 2017-12-05 | End: 2019-02-26 | Stop reason: SDUPTHER

## 2017-12-05 RX ORDER — PRAVASTATIN SODIUM 40 MG/1
TABLET ORAL
Qty: 90 TABLET | Refills: 4 | Status: SHIPPED | OUTPATIENT
Start: 2017-12-05 | End: 2019-02-21 | Stop reason: SDUPTHER

## 2017-12-05 RX ORDER — COLCHICINE 0.6 MG/1
TABLET ORAL
Qty: 3 TABLET | Refills: 0 | Status: SHIPPED | OUTPATIENT
Start: 2017-12-05 | End: 2018-01-23

## 2017-12-05 NOTE — PATIENT INSTRUCTIONS
Eating to Prevent Gout  Gout is a painful form of arthritis caused by an excess of uric acid. This is a waste product made by the body. It builds up in the body and forms crystals that collect in the joints, bringing on a gout attack. Alcohol and certain foods can trigger a gout attack. Below are some guidelines for changing your diet to help you manage gout. Your healthcare provider can work with you to determine the best eating plan for you. Know that diet is only one part of managing gout. Take your medicines as prescribed and follow the other guidelines your healthcare provider has given you.  Foods to limit  Eating too many foods containing purines may increase the levels of uric acid in your body and increase your risk for a gout attack. It may be best to limit these high-purine foods:  · Alcohol (beer, red wine). You may be told to avoid alcohol completely.  · Certain fish (anchovies, sardines, fish roes, herring, tuna, mussels, codfish, scallops, trout, and adrianne)  · Certain meats (red meat, processed meat, rashid, turkey, wild game, and goose)  · Sauces and gravies made with meat  · Organ meats (such as liver, kidneys, sweetbreads, and tripe)  · Legumes (such as dried beans, peas)  · Mushrooms, spinach, asparagus, and cauliflower  · Yeast and yeast extract supplements  Foods to try  Some foods may be helpful for people with gout. You may want to try adding some of the following foods to your diet:  · Dark berries: These include blueberries, blackberries, and cherries. These berries contain chemicals that may lower uric acid.  · Tofu: Tofu, which is made from soy, is a good source of protein. Studies have shown that it may be a better choice than meat for people with gout.  · Omega fatty acids: These acids are found in fatty fish (such as salmon), certain oils (such as flax, olive, or nut oils), or nuts. They may help prevent inflammation due to gout.  The following guidelines are recommended by the  American Medical Association for people with gout. Your diet should be:  · High in fiber, whole grains, fruits, and vegetables.  · Low in protein (15% of calories should come from protein. Choose lean sources such as soy, lean meats, and poultry).  · Low in fat (no more than 30% of calories should come from fat, with only 10% coming from animal fat).   Date Last Reviewed: 6/17/2015  © 7215-2716 [employer]. [address] [place] [address], [place] [address]. All rights reserved. This information is not intended as a substitute for professional medical care. Always follow your healthcare professional's instructions.        Gout Diet  Gout is a painful condition caused by an excess of uric acid, a waste product made by the body. Uric acid forms crystals that collect in the joints. The immune response to these crystals brings on symptoms of joint pain and swelling. This is called a gout attack. Often, medications and diet changes are combined to manage gout. Below are some guidelines for changing your diet to help you manage gout and prevent attacks. Your health care provider will help you determine the best eating plan for you.     Eating to manage gout  Weight loss for those who are overweight may help reduce gout attacks.  Eat less of these foods  Eating too many foods containing purines may raise the levels of uric acid in your body. This raises your risk for a gout attack. Try to limit these foods and drinks:  · Alcohol, such as beer and red wine. You may be told to avoid alcohol completely.  · Soft drinks that contain sugar or high fructose corn syrup  · Certain fish, including anchovies, sardines, fish eggs, and herring  · Shellfish  · Certain meats, such as red meat, hot dogs, luncheon meats, and turkey  · Organ meats, such as liver, kidneys, and sweetbreads  · Legumes, such as dried beans and peas  · Other high fat foods such as gravy, whole milk, and high fat cheeses  · Vegetables such as asparagus,  cauliflower, spinach, and mushrooms used to be thought to contribute to an increased risk for a gout attack, but recent studies show that high purine vegetables don't increase the risk for a gout attack.  Eat more of these foods  Other foods may be helpful for people with gout. Add some of these foods to your diet:  · Cherries contain chemicals that may lower uric acid.  · Omega fatty acids. These are found in some fatty fish such as salmon, certain oils (flax, olive, or nut), and nuts themselves. Omega fatty acids may help prevent inflammation due to gout.  · Dairy products that are low-fat or fat-free, such as cheese and yogurt  · Complex carbohydrate foods, including whole grains, brown rice, oats, and beans  · Coffee, in moderation  · Water, approximately 64 ounces per day  Follow-up care  Follow up with your healthcare provider as advised.  When to seek medical advice  Call your healthcare provider right away if any of these occur:  · Return of gout symptoms, usually at night:  · Severe pain, swelling, and heat in a joint, especially the base of the big toe  · Affected joint is hard to move  · Skin of the affected joint is purple or red  · Fever of 100.4°F (38°C) or higher  · Pain that doesn't get better even with prescribed medicine   Date Last Reviewed: 1/12/2016  © 6521-6512 [employer]. [address] [place] [address], [place] [address]. All rights reserved. This information is not intended as a substitute for professional medical care. Always follow your healthcare professional's instructions.

## 2017-12-05 NOTE — PROGRESS NOTES
Subjective:       Patient ID: Srinath Mcknight is a 77 y.o. male.    Chief Complaint: Follow-up and Diabetic Foot Exam    HPI  Patient comes in today for follow up after surgery   He had two surgeries within the last 2 months. Both in the month of October.   See previous note by PCP.   Today, he is here to follow up on this.  He is out of his Eliqus and per surgeons note, was supposed to have enough medication until 12/12/17.   Pharmacy only filled 60 pills, rx was for 80 pills. I will renew this medication today     He did have some LE edema, he took lasix for 3 days. This has subsided somewhat.  He now has a red, swollen joint at 1st MTP  This started about 12 hours ago.   No fever, no c/ns.   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  Doing well with sugars even after hosp stays.  Due for DM foot exam.        Past Medical History:   Diagnosis Date    Anemia due to unknown mechanism 11/10/2015    Angina pectoris 2014    not since    Arthritis     Back pain     Coronary artery disease     Diabetes mellitus 20 years     am 09/29/2014    Diabetes mellitus type II 1994     am 12/22/2015    DM (diabetes mellitus) 1994    BS 80 am 01/10/2017    Hyperlipemia     Hypertension     CHARLES (obstructive sleep apnea)     Spinal cord disease     Trouble in sleeping     Type 2 diabetes mellitus with diabetic polyneuropathy, without long-term current use of insulin     Uses hearing aid     bilat       Current Outpatient Prescriptions   Medication Sig Dispense Refill    acetaminophen (TYLENOL) 650 MG TbSR Take 1,300 mg by mouth every 8 (eight) hours.      blood sugar diagnostic (ACCU-CHEK FRANKO PLUS TEST STRP) Strp USE  1  STRIP ONE TIME DAILY 100 strip 4    clonazePAM (KLONOPIN) 0.25 MG TbDL DISSOLVE 2 TABLETS BY MOUTH NIGHTLY 90 tablet 0    docusate sodium (COLACE) 100 MG capsule Take 1 capsule (100 mg total) by mouth 2 (two) times daily as needed. 60 capsule  0    gabapentin (NEURONTIN) 100 MG capsule TAKE 1 CAPSULE BY MOUTH EVERY NIGHT AT BEDTIME FOR 1 WEEK, THEN INCREASE TO 2 CAPSULES EVERY NIGHT AT BEDTIME THEREAFTER 180 capsule 1    glipiZIDE (GLUCOTROL) 5 MG tablet TAKE 2 TABLETS (10 MG) TWO TIMES DAILY BEFORE MEALS 360 tablet 4    lancets (ACCU-CHEK MULTICLIX LANCET) Misc TEST ONE TIME DAILY 100 each 3    losartan-hydrochlorothiazide 50-12.5 mg (HYZAAR) 50-12.5 mg per tablet TAKE 1 TABLET ONE TIME DAILY 90 tablet 4    metformin (GLUCOPHAGE) 1000 MG tablet TAKE 1 TABLET TWICE DAILY WITH MEALS 180 tablet 4    metoprolol succinate (TOPROL-XL) 50 MG 24 hr tablet TAKE 1 TABLET EVERY DAY 90 tablet 3    multivitamin (ONE DAILY MULTIVITAMIN) per tablet Take 1 tablet by mouth once daily.      oxyCODONE-acetaminophen (PERCOCET)  mg per tablet Take 1 tablet by mouth every 4 (four) hours as needed. 60 tablet 0    potassium chloride SA (K-DUR,KLOR-CON) 20 MEQ tablet       pramipexole (MIRAPEX) 0.25 MG tablet TAKE 1 TABLET BY MOUTH EVERY EVENING 30 tablet 5    pravastatin (PRAVACHOL) 40 MG tablet TAKE 1 TABLET EVERY DAY 90 tablet 4    senna (SENNA CONCENTRATE) 8.6 mg tablet Take 1 tablet by mouth once daily. 60 tablet 0    sildenafil (VIAGRA) 100 MG tablet Take 1 tablet (100 mg total) by mouth as needed. Take on an empty stomach 6 tablet 11    apixaban (ELIQUIS) 2.5 mg Tab Take 1 tablet (2.5 mg total) by mouth 2 (two) times daily. 14 tablet 0    colchicine 0.6 mg tablet Take 2 pills now, then take 1 pill 2 hours later. For a total of 3 pills. 3 tablet 0     No current facility-administered medications for this visit.        Review of Systems   Constitutional: Negative.  Negative for activity change, fatigue, fever and unexpected weight change.   HENT: Negative.  Negative for facial swelling, trouble swallowing and voice change.    Eyes: Negative.  Negative for visual disturbance.   Respiratory: Negative.  Negative for cough, chest tightness and shortness of  "breath.    Cardiovascular: Negative.    Gastrointestinal: Negative.  Negative for abdominal distention, abdominal pain and blood in stool.   Endocrine: Negative.  Negative for cold intolerance, polydipsia and polyuria.   Genitourinary: Negative.  Negative for difficulty urinating, flank pain and penile pain.   Musculoskeletal: Positive for arthralgias and back pain.   Skin: Negative for color change and rash.   Allergic/Immunologic: Negative.  Negative for immunocompromised state.   Neurological: Negative.  Negative for dizziness, seizures, facial asymmetry and speech difficulty.   Hematological: Negative.  Negative for adenopathy. Does not bruise/bleed easily.   Psychiatric/Behavioral: Negative.  Negative for agitation and suicidal ideas.       Objective:   /64   Pulse 80   Temp 97.6 °F (36.4 °C)   Ht 6' 3" (1.905 m)   Wt 96.4 kg (212 lb 8.4 oz)   BMI 26.56 kg/m²      Physical Exam   Constitutional: He is oriented to person, place, and time. He appears well-developed and well-nourished. No distress.   HENT:   Head: Normocephalic and atraumatic.   Right Ear: External ear normal.   Left Ear: External ear normal.   Cardiovascular: Normal rate, regular rhythm and normal heart sounds.    Pulses:       Dorsalis pedis pulses are 2+ on the right side, and 2+ on the left side.        Posterior tibial pulses are 2+ on the right side, and 2+ on the left side.   Pulmonary/Chest: Effort normal and breath sounds normal.   Musculoskeletal: He exhibits no edema.        Feet:    Feet:   Right Foot:   Protective Sensation: 4 sites tested. 4 sites sensed.   Left Foot:   Protective Sensation: 4 sites tested. 4 sites sensed.   Neurological: He is alert and oriented to person, place, and time.         Lab Results   Component Value Date    WBC 5.22 12/05/2017    HGB 11.0 (L) 12/05/2017    HCT 34.0 (L) 12/05/2017     12/05/2017    CHOL 142 03/07/2017    TRIG 135 03/07/2017    HDL 28 (L) 03/07/2017    ALT 26 10/26/2017    " AST 21 10/26/2017     11/02/2017    K 4.4 11/02/2017     11/02/2017    CREATININE 1.3 11/02/2017    BUN 17 11/02/2017    CO2 26 11/02/2017    TSH 1.362 12/23/2011    PSA 0.99 02/23/2016    INR 0.9 07/19/2016    GLUF 106 08/22/2011    HGBA1C 6.5 (H) 10/09/2017       Assessment:       1. Controlled type 2 diabetes mellitus without complication, without long-term current use of insulin    2. Acute idiopathic gout involving toe of right foot    3. Post-operative state        Plan:   Controlled type 2 diabetes mellitus without complication, without long-term current use of insulin  Foot exam today   Last A1c controlled.   3 month follow up   Acute idiopathic gout involving toe of right foot  -     colchicine 0.6 mg tablet; Take 2 pills now, then take 1 pill 2 hours later. For a total of 3 pills.  Dispense: 3 tablet; Refill: 0  Post-operative state  Per chart, patient not supposed to be off of Eliquis until 12/12, looks like kolby gave him 60 pills instead of 80 so will extend this medication until he sees ortho     -     apixaban (ELIQUIS) 2.5 mg Tab; Take 1 tablet (2.5 mg total) by mouth 2 (two) times daily.  Dispense: 14 tablet; Refill: 0

## 2017-12-06 ENCOUNTER — TELEPHONE (OUTPATIENT)
Dept: INTERNAL MEDICINE | Facility: CLINIC | Age: 77
End: 2017-12-06

## 2017-12-06 NOTE — TELEPHONE ENCOUNTER
----- Message from Karmen Ponce sent at 12/6/2017 12:10 PM CST -----  Patient returning the nurse call. Please adv/call [phone].//thanks. cw

## 2017-12-12 ENCOUNTER — HOSPITAL ENCOUNTER (OUTPATIENT)
Dept: RADIOLOGY | Facility: HOSPITAL | Age: 77
Discharge: HOME | End: 2017-12-12
Attending: ORTHOPAEDIC SURGERY
Payer: MEDICARE

## 2017-12-12 ENCOUNTER — TELEPHONE (OUTPATIENT)
Dept: INTERNAL MEDICINE | Facility: CLINIC | Age: 77
End: 2017-12-12

## 2017-12-12 ENCOUNTER — OFFICE VISIT (OUTPATIENT)
Dept: ORTHOPEDICS | Facility: CLINIC | Age: 77
End: 2017-12-12
Payer: MEDICARE

## 2017-12-12 VITALS
DIASTOLIC BLOOD PRESSURE: 67 MMHG | WEIGHT: 211 LBS | RESPIRATION RATE: 20 BRPM | SYSTOLIC BLOOD PRESSURE: 129 MMHG | HEART RATE: 72 BPM | BODY MASS INDEX: 26.24 KG/M2 | HEIGHT: 75 IN

## 2017-12-12 DIAGNOSIS — G58.9 PINCHED NERVE: Primary | ICD-10-CM

## 2017-12-12 DIAGNOSIS — M54.41 CHRONIC BILATERAL LOW BACK PAIN WITH BILATERAL SCIATICA: Primary | ICD-10-CM

## 2017-12-12 DIAGNOSIS — G89.29 CHRONIC BILATERAL LOW BACK PAIN WITH BILATERAL SCIATICA: Primary | ICD-10-CM

## 2017-12-12 DIAGNOSIS — M25.552 LEFT HIP PAIN: ICD-10-CM

## 2017-12-12 DIAGNOSIS — M54.42 CHRONIC BILATERAL LOW BACK PAIN WITH BILATERAL SCIATICA: Primary | ICD-10-CM

## 2017-12-12 PROCEDURE — 73502 X-RAY EXAM HIP UNI 2-3 VIEWS: CPT | Mod: 26,LT,, | Performed by: RADIOLOGY

## 2017-12-12 PROCEDURE — 73502 X-RAY EXAM HIP UNI 2-3 VIEWS: CPT | Mod: TC,PO,LT

## 2017-12-12 PROCEDURE — 99213 OFFICE O/P EST LOW 20 MIN: CPT | Mod: 24,S$GLB,, | Performed by: ORTHOPAEDIC SURGERY

## 2017-12-12 PROCEDURE — 99999 PR PBB SHADOW E&M-EST. PATIENT-LVL III: CPT | Mod: PBBFAC,,, | Performed by: ORTHOPAEDIC SURGERY

## 2017-12-12 RX ORDER — OXYCODONE AND ACETAMINOPHEN 10; 325 MG/1; MG/1
1 TABLET ORAL EVERY 4 HOURS PRN
Qty: 90 TABLET | Refills: 0 | Status: SHIPPED | OUTPATIENT
Start: 2017-12-12 | End: 2018-01-23

## 2017-12-12 RX ORDER — IBUPROFEN 200 MG
TABLET ORAL
COMMUNITY
Start: 2017-12-04 | End: 2017-12-18

## 2017-12-12 NOTE — TELEPHONE ENCOUNTER
----- Message from Rosita Bravo LPN sent at 12/12/2017 12:24 PM CST -----  Good Afternoon,     Dr. Alcocer saw pt today. Pt c/o gout. Pt states that he was suppose to receive injection of steroid but could not d/t blood thinner prescribed by Dr. Alcocer. Dr. Alcocer would like to know if pt can have allopurinol po. Please advise//lp

## 2017-12-14 ENCOUNTER — HOSPITAL ENCOUNTER (EMERGENCY)
Facility: HOSPITAL | Age: 77
Discharge: HOME | End: 2017-12-14
Attending: EMERGENCY MEDICINE
Payer: MEDICARE

## 2017-12-14 VITALS
RESPIRATION RATE: 20 BRPM | BODY MASS INDEX: 26.36 KG/M2 | OXYGEN SATURATION: 97 % | HEIGHT: 75 IN | SYSTOLIC BLOOD PRESSURE: 140 MMHG | HEART RATE: 69 BPM | TEMPERATURE: 98 F | WEIGHT: 212 LBS | DIASTOLIC BLOOD PRESSURE: 65 MMHG

## 2017-12-14 DIAGNOSIS — M17.12 PRIMARY OSTEOARTHRITIS OF LEFT KNEE: Primary | ICD-10-CM

## 2017-12-14 DIAGNOSIS — M25.569 KNEE PAIN: ICD-10-CM

## 2017-12-14 DIAGNOSIS — M79.606 LEG PAIN: ICD-10-CM

## 2017-12-14 PROCEDURE — 25000003 PHARM REV CODE 250: Performed by: EMERGENCY MEDICINE

## 2017-12-14 PROCEDURE — 99284 EMERGENCY DEPT VISIT MOD MDM: CPT

## 2017-12-14 RX ORDER — OXYCODONE AND ACETAMINOPHEN 10; 325 MG/1; MG/1
1 TABLET ORAL
Status: COMPLETED | OUTPATIENT
Start: 2017-12-14 | End: 2017-12-14

## 2017-12-14 RX ADMIN — OXYCODONE HYDROCHLORIDE AND ACETAMINOPHEN 1 TABLET: 10; 325 TABLET ORAL at 11:12

## 2017-12-14 NOTE — ED NOTES
Pt lying in bed in NAD resting comfortably, VSS, RR equal and unlabored. Bed is low, locked, and call light in reach. Side rails up x 2.

## 2017-12-14 NOTE — ED PROVIDER NOTES
"SCRIBE #1 NOTE: I, Suyapa Beal, am scribing for, and in the presence of, Odessa Lynch MD. I have scribed the entire note.      History      Chief Complaint   Patient presents with    Leg Pain     Pt states, "I had a left hip replacement 9 weeks ago and I am having severe pain in my left knee and left thigh, it hurts so bad I can't even stand on that leg.       Review of patient's allergies indicates:   Allergen Reactions    Sulfa (sulfonamide antibiotics)      Can't recall from 1995        HPI   HPI    12/14/2017, 9:51 AM   History obtained from the patient      History of Present Illness: Srinath Mcknight is a 77 y.o. male patient who presents to the Emergency Department for leg pain which onset gradually 1 week ago. Symptoms are constant and moderate in severity. Pain is located to L knee and L upper thigh. Pt reports seeing Dr. Alcocer for sxs in which it was dx as arthritis. Pain is exacerbated with ambulation. No associated sxs reported. Patient denies any injury, strain, leg swelling, calf pain, back pain, shortness of breath, weakness, numbness, and all other sxs at this time. No further complaints or concerns at this time.         Arrival mode: Personal vehicle    PCP: Yeison Wilder MD       Past Medical History:  Past Medical History:   Diagnosis Date    Anemia due to unknown mechanism 11/10/2015    Angina pectoris 2014    not since    Arthritis     Back pain     Coronary artery disease     Diabetes mellitus 20 years     am 09/29/2014    Diabetes mellitus type II 1994     am 12/22/2015    DM (diabetes mellitus) 1994    BS 80 am 01/10/2017    Gout 12/05/2017    right foot     Hyperlipemia     Hypertension     CHARLES (obstructive sleep apnea)     Spinal cord disease     Trouble in sleeping     Type 2 diabetes mellitus with diabetic polyneuropathy, without long-term current use of insulin     Uses hearing aid     bilat       Past Surgical History:  Past Surgical History: "   Procedure Laterality Date    BACK SURGERY      HERNIA REPAIR Bilateral 04/18/2017    JOINT REPLACEMENT Left 10/09/2017    L YAHIR Dr. Alcocer    LAMINECTOMY  07/22/2016    ROTATOR CUFF REPAIR Left 2006    SHOULDER ARTHROSCOPY W/ ROTATOR CUFF REPAIR Right 2009         Family History:  Family History   Problem Relation Age of Onset    Hypertension Mother     Heart disease Mother     Diabetes Mother     Hypertension Father     Heart disease Father     Diabetes Father     Stroke Father     Diabetes Sister     Cancer Brother 50     pancreas    Drug abuse Brother     Prostate cancer Neg Hx     Macular degeneration Neg Hx     Retinal detachment Neg Hx     Strabismus Neg Hx     Glaucoma Neg Hx     Blindness Neg Hx     Amblyopia Neg Hx     Kidney disease Neg Hx     Mental illness Neg Hx     Mental retardation Neg Hx     COPD Neg Hx     Asthma Neg Hx        Social History:  Social History     Social History Main Topics    Smoking status: Never Smoker    Smokeless tobacco: Never Used    Alcohol use 0.6 oz/week     1 Glasses of wine per week      Comment: rarely  No alcohol prior to surgery    Drug use: No    Sexual activity: Yes     Partners: Female     Birth control/ protection: Condom       ROS   Review of Systems   Constitutional: Negative for chills and fever.   HENT: Negative for sore throat.    Respiratory: Negative for shortness of breath.    Cardiovascular: Negative for chest pain, palpitations and leg swelling.   Gastrointestinal: Negative for nausea.   Genitourinary: Negative for dysuria.   Musculoskeletal: Positive for arthralgias (L knee). Negative for back pain, joint swelling and neck pain.   Skin: Negative for rash.   Neurological: Negative for weakness.   Hematological: Does not bruise/bleed easily.       Physical Exam      Initial Vitals [12/14/17 0930]   BP Pulse Resp Temp SpO2   (!) 140/65 69 20 98.2 °F (36.8 °C) 97 %      MAP       90          Physical Exam  Nursing Notes and  "Vital Signs Reviewed.  Constitutional: Patient is in no apparent distress. Well-developed and well-nourished.  Head: Atraumatic. Normocephalic.  Eyes: PERRL. EOM intact. Conjunctivae are not pale. No scleral icterus.  ENT: Mucous membranes are moist. Oropharynx is clear and symmetric.    Neck: Supple. Full ROM. No lymphadenopathy.  Cardiovascular: Regular rate. Regular rhythm. No murmurs, rubs, or gallops. Distal pulses are 2+ and symmetric.  Pulmonary/Chest: No respiratory distress. Clear to auscultation bilaterally. No wheezing or rales.  Abdominal: Soft and non-distended.  There is no tenderness.  No rebound, guarding, or rigidity. Good bowel sounds.  Genitourinary: No CVA tenderness  Musculoskeletal: Moves all extremities. No obvious deformities. No edema. No calf tenderness.  Skin: Warm and dry.  Neurological:  Alert, awake, and appropriate.  Normal speech.  No acute focal neurological deficits are appreciated.  Psychiatric: Normal affect. Good eye contact. Appropriate in content.    ED Course    Procedures  ED Vital Signs:  Vitals:    12/14/17 0930   BP: (!) 140/65   Pulse: 69   Resp: 20   Temp: 98.2 °F (36.8 °C)   TempSrc: Oral   SpO2: 97%   Weight: 96.2 kg (212 lb)   Height: 6' 3" (1.905 m)       Abnormal Lab Results:  Labs Reviewed - No data to display     All Lab Results:  Results for orders placed or performed in visit on 12/05/17   CBC auto differential   Result Value Ref Range    WBC 5.22 3.90 - 12.70 K/uL    RBC 4.00 (L) 4.60 - 6.20 M/uL    Hemoglobin 11.0 (L) 14.0 - 18.0 g/dL    Hematocrit 34.0 (L) 40.0 - 54.0 %    MCV 85 82 - 98 fL    MCH 27.5 27.0 - 31.0 pg    MCHC 32.4 32.0 - 36.0 g/dL    RDW 14.7 (H) 11.5 - 14.5 %    Platelets 210 150 - 350 K/uL    MPV 10.2 9.2 - 12.9 fL    Immature Granulocytes 0.2 0.0 - 0.5 %    Gran # 2.8 1.8 - 7.7 K/uL    Immature Grans (Abs) 0.01 0.00 - 0.04 K/uL    Lymph # 1.6 1.0 - 4.8 K/uL    Mono # 0.5 0.3 - 1.0 K/uL    Eos # 0.2 0.0 - 0.5 K/uL    Baso # 0.04 0.00 - 0.20 " K/uL    nRBC 0 0 /100 WBC    Gran% 53.6 38.0 - 73.0 %    Lymph% 31.2 18.0 - 48.0 %    Mono% 10.0 4.0 - 15.0 %    Eosinophil% 4.2 0.0 - 8.0 %    Basophil% 0.8 0.0 - 1.9 %    Differential Method Automated          Imaging Results:  Imaging Results          US Lower Extremity Veins Left (Final result)  Result time 12/14/17 11:24:25    Final result by Low Watson MD (Timothy) (12/14/17 11:24:25)                 Impression:         No evidence of deep venous thrombosis left lower extremity.      Electronically signed by: LOW WATSON MD  Date:     12/14/17  Time:    11:24              Narrative:    Ultrasound Venous Left lower extremity    Clinical Indication:    Left leg pain.    Findings:     Color flow and spectral doppler vascular ultrasound was performed. There is documented compressibility and color Doppler flow left lower extremity with normal venous waveforms and good calf augmentation response.                             X-Ray Knee Complete 4 or More Views Left (Final result)  Result time 12/14/17 10:42:57    Final result by Chandu De Jesus MD (12/14/17 10:42:57)                 Impression:      Mild primary DJD.      Electronically signed by: CHANDU DE JESUS MD  Date:     12/14/17  Time:    10:42              Narrative:    Exam: XR KNEE COMP 4 OR MORE VIEWS LEFT,    Date:  12/14/17 10:31:51    History: Left knee joint pain    Comparison:  07/11/2017    Findings: Mild medial and lateral compartment spurring.  Mild patellofemoral joint spurring with spurring of the superior quadriceps tendon insertion and the inferior patellar ligament insertion.    No acute findings.  No joint effusion.                                      The Emergency Provider reviewed the vital signs and test results, which are outlined above.    ED Discussion     11:31 AM: Reassessed pt at this time.  Pt states his condition has improved at this time. Discussed with pt all pertinent ED information and results. Discussed pt dx  and plan of tx. Gave pt all f/u and return to the ED instructions. All questions and concerns were addressed at this time. Pt expresses understanding of information and instructions, and is comfortable with plan to discharge. Pt is stable for discharge.    I discussed with patient and/or family/caretaker that evaluation in the ED does not suggest any emergent or life threatening medical conditions requiring immediate intervention beyond what was provided in the ED, and I believe patient is safe for discharge.  Regardless, an unremarkable evaluation in the ED does not preclude the development or presence of a serious of life threatening condition. As such, patient was instructed to return immediately for any worsening or change in current symptoms.        ED Medication(s):  Medications   oxyCODONE-acetaminophen  mg per tablet 1 tablet (1 tablet Oral Given 12/14/17 1106)       Discharge Medication List as of 12/14/2017 11:31 AM          Follow-up Information     Albaro Alcocer Sr, MD. Schedule an appointment as soon as possible for a visit in 2 days.    Specialty:  Orthopedic Surgery  Why:  Return to the emergency room, If symptoms worsen  Contact information:  [address] [facility] [address]  [phone]                     Medical Decision Making    Medical Decision Making:   Clinical Tests:   Lab Tests: Reviewed and Ordered  Radiological Study: Reviewed and Ordered  Medical Tests: Reviewed and Ordered           Scribe Attestation:   Scribe #1: I performed the above scribed service and the documentation accurately describes the services I performed. I attest to the accuracy of the note.    Attending:   Physician Attestation Statement for Scribe #1: I, Odessa Lynch MD, personally performed the services described in this documentation, as scribed by Suyapa Beal, in my presence, and it is both accurate and complete.          Clinical Impression       ICD-10-CM ICD-9-CM   1. Primary  osteoarthritis of left knee M17.12 715.16   2. Knee pain M25.569 719.46   3. Leg pain M79.606 729.5       Disposition:   Disposition: Discharged  Condition: Stable         Odessa Lynch MD  12/16/17 0703

## 2017-12-15 ENCOUNTER — TELEPHONE (OUTPATIENT)
Dept: INTERNAL MEDICINE | Facility: CLINIC | Age: 77
End: 2017-12-15

## 2017-12-15 ENCOUNTER — LAB VISIT (OUTPATIENT)
Dept: LAB | Facility: HOSPITAL | Age: 77
End: 2017-12-15
Attending: INTERNAL MEDICINE
Payer: MEDICARE

## 2017-12-15 DIAGNOSIS — M25.569 ARTHRALGIA OF KNEE, UNSPECIFIED LATERALITY: Primary | ICD-10-CM

## 2017-12-15 DIAGNOSIS — M25.569 ARTHRALGIA OF KNEE, UNSPECIFIED LATERALITY: ICD-10-CM

## 2017-12-15 PROBLEM — G89.29 CHRONIC BILATERAL LOW BACK PAIN WITH BILATERAL SCIATICA: Status: ACTIVE | Noted: 2017-12-15

## 2017-12-15 PROBLEM — M54.42 CHRONIC BILATERAL LOW BACK PAIN WITH BILATERAL SCIATICA: Status: ACTIVE | Noted: 2017-12-15

## 2017-12-15 PROBLEM — M54.41 CHRONIC BILATERAL LOW BACK PAIN WITH BILATERAL SCIATICA: Status: ACTIVE | Noted: 2017-12-15

## 2017-12-15 LAB — URATE SERPL-MCNC: 7.4 MG/DL

## 2017-12-15 PROCEDURE — 84550 ASSAY OF BLOOD/URIC ACID: CPT

## 2017-12-15 PROCEDURE — 36415 COLL VENOUS BLD VENIPUNCTURE: CPT | Mod: PO

## 2017-12-15 NOTE — PROGRESS NOTES
CC: This is a 77-year-old male that complains of an radiating down the left thigh down to the foot.  The patient is status post left total hip replacement.    HPI: The patient has been treated with walker assisted ambulation.    PMH:    Past Medical History:   Diagnosis Date    Anemia due to unknown mechanism 11/10/2015    Angina pectoris 2014    not since    Arthritis     Back pain     Coronary artery disease     Diabetes mellitus 20 years     am 09/29/2014    Diabetes mellitus type II 1994     am 12/22/2015    DM (diabetes mellitus) 1994    BS 80 am 01/10/2017    Gout 12/05/2017    right foot     Hyperlipemia     Hypertension     CHARLES (obstructive sleep apnea)     Spinal cord disease     Trouble in sleeping     Type 2 diabetes mellitus with diabetic polyneuropathy, without long-term current use of insulin     Uses hearing aid     bilat       PSH:    Past Surgical History:   Procedure Laterality Date    BACK SURGERY      HERNIA REPAIR Bilateral 04/18/2017    JOINT REPLACEMENT Left 10/09/2017    L YAHIR Dr. Alcocer    LAMINECTOMY  07/22/2016    ROTATOR CUFF REPAIR Left 2006    SHOULDER ARTHROSCOPY W/ ROTATOR CUFF REPAIR Right 2009       Family Hx:    Family History   Problem Relation Age of Onset    Hypertension Mother     Heart disease Mother     Diabetes Mother     Hypertension Father     Heart disease Father     Diabetes Father     Stroke Father     Diabetes Sister     Cancer Brother 50     pancreas    Drug abuse Brother     Prostate cancer Neg Hx     Macular degeneration Neg Hx     Retinal detachment Neg Hx     Strabismus Neg Hx     Glaucoma Neg Hx     Blindness Neg Hx     Amblyopia Neg Hx     Kidney disease Neg Hx     Mental illness Neg Hx     Mental retardation Neg Hx     COPD Neg Hx     Asthma Neg Hx        Allergy:    Review of patient's allergies indicates:   Allergen Reactions    Sulfa (sulfonamide antibiotics)      Can't recall from 1995        Medication:    Current Outpatient Prescriptions:     acetaminophen (TYLENOL) 650 MG TbSR, Take 1,300 mg by mouth every 8 (eight) hours., Disp: , Rfl:     blood sugar diagnostic (ACCU-CHEK FRANKO PLUS TEST STRP) Strp, USE  1  STRIP ONE TIME DAILY, Disp: 100 strip, Rfl: 4    clonazePAM (KLONOPIN) 0.25 MG TbDL, DISSOLVE 2 TABLETS BY MOUTH NIGHTLY, Disp: 90 tablet, Rfl: 0    colchicine 0.6 mg tablet, Take 2 pills now, then take 1 pill 2 hours later. For a total of 3 pills., Disp: 3 tablet, Rfl: 0    docusate sodium (COLACE) 100 MG capsule, Take 1 capsule (100 mg total) by mouth 2 (two) times daily as needed., Disp: 60 capsule, Rfl: 0    gabapentin (NEURONTIN) 100 MG capsule, TAKE 1 CAPSULE BY MOUTH EVERY NIGHT AT BEDTIME FOR 1 WEEK, THEN INCREASE TO 2 CAPSULES EVERY NIGHT AT BEDTIME THEREAFTER, Disp: 180 capsule, Rfl: 1    glipiZIDE (GLUCOTROL) 5 MG tablet, TAKE 2 TABLETS (10 MG) TWO TIMES DAILY BEFORE MEALS, Disp: 360 tablet, Rfl: 4    ibuprofen (ADVIL,MOTRIN) 200 MG tablet, , Disp: , Rfl:     lancets (ACCU-CHEK MULTICLIX LANCET) Misc, TEST ONE TIME DAILY, Disp: 100 each, Rfl: 3    losartan-hydrochlorothiazide 50-12.5 mg (HYZAAR) 50-12.5 mg per tablet, TAKE 1 TABLET ONE TIME DAILY, Disp: 90 tablet, Rfl: 4    metformin (GLUCOPHAGE) 1000 MG tablet, TAKE 1 TABLET TWICE DAILY WITH MEALS, Disp: 180 tablet, Rfl: 4    metoprolol succinate (TOPROL-XL) 50 MG 24 hr tablet, TAKE 1 TABLET EVERY DAY, Disp: 90 tablet, Rfl: 3    multivitamin (ONE DAILY MULTIVITAMIN) per tablet, Take 1 tablet by mouth once daily., Disp: , Rfl:     oxyCODONE-acetaminophen (PERCOCET)  mg per tablet, Take 1 tablet by mouth every 4 (four) hours as needed., Disp: 60 tablet, Rfl: 0    pravastatin (PRAVACHOL) 40 MG tablet, TAKE 1 TABLET EVERY DAY, Disp: 90 tablet, Rfl: 4    apixaban 2.5 mg Tab, Take by mouth 2 (two) times daily., Disp: , Rfl:     oxyCODONE-acetaminophen (PERCOCET)  mg per tablet, Take 1 tablet by mouth every  "4 (four) hours as needed., Disp: 90 tablet, Rfl: 0    pramipexole (MIRAPEX) 0.25 MG tablet, TAKE 1 TABLET BY MOUTH EVERY EVENING, Disp: 30 tablet, Rfl: 5    sildenafil (VIAGRA) 100 MG tablet, Take 1 tablet (100 mg total) by mouth as needed. Take on an empty stomach, Disp: 6 tablet, Rfl: 11  No current facility-administered medications for this visit.     Social History:    Social History     Social History    Marital status:      Spouse name: N/A    Number of children: 0    Years of education: N/A     Occupational History    retired      teacher     Social History Main Topics    Smoking status: Never Smoker    Smokeless tobacco: Never Used    Alcohol use 0.6 oz/week     1 Glasses of wine per week      Comment: rarely  No alcohol prior to surgery    Drug use: No    Sexual activity: Yes     Partners: Female     Birth control/ protection: Condom     Other Topics Concern    Not on file     Social History Narrative    Single lives alone. No children. Retired but some part time work - 4 hours a day. 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 Caffeine intake - sugar free cola, Rare coffee intake. Still drives. Does have a Living Will . Has a nephew Jt Gayle, lives in [place]. Has a friend Karina Rossi, locally who could help him.        Vitals:   /67 (BP Location: Right arm, Patient Position: Sitting, BP Method: Small (Automatic))   Pulse 72   Resp 20   Ht 6' 3" (1.905 m)   Wt 95.7 kg (211 lb)   BMI 26.37 kg/m²      ROS:  GENERAL: No fever, chills, fatigability or weight loss.  SKIN: No rashes, itching or changes in color or texture of skin.  HEAD: No headaches or recent head trauma.  EYES: Visual acuity fine. No photophobia, ocular pain or diplopia.  EARS: Denies ear pain, discharge or vertigo.  NOSE: No loss of smell, no epistaxis or postnasal drip.  MOUTH & THROAT: No hoarseness or change in voice. No excessive gum bleeding.  NODES: Denies swollen glands.  CHEST: Denies DAMON, cyanosis, " wheezing, cough and sputum production.  CARDIOVASCULAR: Denies chest pain, PND, orthopnea or reduced exercise tolerance.  ABDOMEN: Appetite fine. No weight loss. Denies diarrhea, abdominal pain, hematemesis or blood in stool.  URINARY: No flank pain, dysuria or hematuria.  PERIPHERAL VASCULAR: No claudication or cyanosis.  NEUROLOGIC: No history of seizures, paralysis, alteration of gait or coordination.  MUSCULOSKELETAL: See HPI    PE:  APPEARANCE: Well nourished, well developed, in no acute distress.   HEAD: Normocephalic, atraumatic.  EYES: PERRL. EOMI.   EARS: TM's intact. Light reflex normal. No retraction or perforation.   NOSE: Mucosa pink. Airway clear.  MOUTH & THROAT: No tonsillar enlargement. No pharyngeal erythema or exudate. No stridor.  NECK: Supple.   NODES: No cervical, axillary or inguinal lymph node enlargement.  CHEST: Lungs clear to auscultation.  CARDIOVASCULAR: Normal S1, S2. No rubs, murmurs or gallops.  ABDOMEN: Bowel sounds normal. Not distended. Soft. No tenderness or masses.  NEUROLOGIC: Cranial Nerves: II-XII grossly intact, also see MUSCULOSKELETAL     Lumbar spine-decreased range of motion, symmetrical deep tendon reflexes and motor strength as well as sensory examination bilateral lower extremities.       Left   Hip   -  2 plus dorsalis pedis and posterior tibial artery pulses, light touch intact Left lower extremity.  All digits are warm. No erythema, no warmth, no drainage, no swelling, no significant tenderness.  Less than 2 seconds capillary refill all digits.           Assessment:           Diagnosis:              1.  Status post left total hip replacement               2.  Lumbar radiculopathy    Diagnostic Studies  MRI-No  X-Ray-No  EMG/NCV-No  Arthrogram-No  Bone Scan-No  CT Scan-No  Doppler-No  ESR-No  CRP-No  CBC with Diff-No   Rheumatoid/Arthritis Panel-No      Plan:                                                 1. PT-yes                                                  2.OT-no                                          3.NSAID-yes                                        4. Narcotics-no                                     5. Wound care-N/A                                 6. Rest-yes                                           7. Surgery-no                                         8. BA Hose-no                                    9. Anticoagulation therapy-no               10. Elevation-no                                     11. Crutches-no                                    12. Walker-no             13. Cane no                        14. Referral-to physiatry or a spine surgeon for lumbar spine problems.                                     15.Injection-no                            16. Splint   /    Cast   /   Cast Shoe-No              17. RICE-none            18. Follow up- 3 months

## 2017-12-15 NOTE — TELEPHONE ENCOUNTER
Patients gout attack in December was in his toe.  His current complaint is knee and thigh pain.  The evalauation in the ED was not consistent with gout (no erythema, no warmth, no edema of the joint).  This does not sound like gout.  We can order a uric acid level and in the meantime he should use the pain medication the ED prescribed until we can get the results of the labs and see him for follow up evaluation.

## 2017-12-15 NOTE — PATIENT INSTRUCTIONS
After Hip Replacement: Home Safety  Becoming more aware of hazards in your home can help make your recovery safer. You might want to have furniture rearranged so its easier to get around. In the bathroom, aids like a shower hose and a raised toilet seat can help you stay safe. Dont forget to watch out for hazards like wet floors, loose or worn rugs, or uneven surfaces.      Date Last Reviewed: 8/28/2015  © 7381-8889 [employer], "[employer]". [address] [place] [address], [place] [address]. All rights reserved. This information is not intended as a substitute for professional medical care. Always follow your healthcare professional's instructions.

## 2017-12-15 NOTE — TELEPHONE ENCOUNTER
----- Message from Jocy Grossman sent at 12/15/2017 10:40 AM CST -----  Pt is requesting a call from nurse to discuss a prescription Gout .          Please call pt back at [phone]        .  [facility] [employer] [address] [place] [address] [place] - [address]  & [facility]  [address] [facility]  [place] [address]  Phone: [phone] Fax: [phone]

## 2017-12-15 NOTE — TELEPHONE ENCOUNTER
Patient stated that he saw you for gout.  You told him you wouldn't prescribe anything further for the gout until he saw Dr. Alcocer.  Patient stated Dr. Alcocer said he needed to see us for the gout.  He went to the emergency room for it yesterday and all they would do was give him pain pills.  Patient stated he just needs something to treat his gout.  Please advise.

## 2017-12-18 ENCOUNTER — OFFICE VISIT (OUTPATIENT)
Dept: INTERNAL MEDICINE | Facility: CLINIC | Age: 77
End: 2017-12-18
Payer: MEDICARE

## 2017-12-18 ENCOUNTER — TELEPHONE (OUTPATIENT)
Dept: ORTHOPEDICS | Facility: CLINIC | Age: 77
End: 2017-12-18

## 2017-12-18 VITALS
HEIGHT: 75 IN | TEMPERATURE: 97 F | BODY MASS INDEX: 26.36 KG/M2 | WEIGHT: 212 LBS | DIASTOLIC BLOOD PRESSURE: 84 MMHG | OXYGEN SATURATION: 97 % | SYSTOLIC BLOOD PRESSURE: 140 MMHG | HEART RATE: 84 BPM

## 2017-12-18 DIAGNOSIS — G89.18 ACUTE POSTOPERATIVE PAIN OF LEFT HIP: Primary | ICD-10-CM

## 2017-12-18 DIAGNOSIS — M25.552 ACUTE POSTOPERATIVE PAIN OF LEFT HIP: Primary | ICD-10-CM

## 2017-12-18 PROCEDURE — 96372 THER/PROPH/DIAG INJ SC/IM: CPT | Mod: S$GLB,,, | Performed by: INTERNAL MEDICINE

## 2017-12-18 PROCEDURE — 99213 OFFICE O/P EST LOW 20 MIN: CPT | Mod: 25,S$GLB,, | Performed by: PHYSICIAN ASSISTANT

## 2017-12-18 PROCEDURE — 99999 PR PBB SHADOW E&M-EST. PATIENT-LVL IV: CPT | Mod: PBBFAC,,, | Performed by: PHYSICIAN ASSISTANT

## 2017-12-18 RX ORDER — KETOROLAC TROMETHAMINE 30 MG/ML
30 INJECTION, SOLUTION INTRAMUSCULAR; INTRAVENOUS
Status: COMPLETED | OUTPATIENT
Start: 2017-12-18 | End: 2017-12-18

## 2017-12-18 RX ORDER — KETOROLAC TROMETHAMINE 30 MG/ML
30 INJECTION, SOLUTION INTRAMUSCULAR; INTRAVENOUS
Status: DISCONTINUED | OUTPATIENT
Start: 2017-12-18 | End: 2017-12-18

## 2017-12-18 RX ADMIN — KETOROLAC TROMETHAMINE 30 MG: 30 INJECTION, SOLUTION INTRAMUSCULAR; INTRAVENOUS at 04:12

## 2017-12-19 DIAGNOSIS — M54.41 CHRONIC BILATERAL LOW BACK PAIN WITH BILATERAL SCIATICA: Primary | ICD-10-CM

## 2017-12-19 DIAGNOSIS — M54.42 CHRONIC BILATERAL LOW BACK PAIN WITH BILATERAL SCIATICA: Primary | ICD-10-CM

## 2017-12-19 DIAGNOSIS — G89.29 CHRONIC BILATERAL LOW BACK PAIN WITH BILATERAL SCIATICA: Primary | ICD-10-CM

## 2017-12-19 NOTE — PROGRESS NOTES
Subjective:       Patient ID: Srinath Mcknight is a 77 y.o. male.    Chief Complaint: Leg Pain    Hip Pain    The incident occurred more than 1 week ago. The incident occurred at home. Injury mechanism: recent hip replacement, left  Pain location: left groin, radiates to thigh and someitmes knee. The quality of the pain is described as aching and shooting. The pain is at a severity of 9/10. The pain is severe. The pain has been fluctuating since onset. Pertinent negatives include no inability to bear weight, loss of motion, loss of sensation, muscle weakness, numbness or tingling. It is unknown if a foreign body is present. The symptoms are aggravated by weight bearing and movement. Treatments tried: percocet, NSAIDs. The treatment provided no relief.     Patient comes in today for significant left groin and leg pain   Had surgery on 10/9    Over the past couple of weeks he was having some left knee and thigh pain. This dramatically worsened over the past 6 days or so   He went to ER, US for DVT negative   He was seen by ortho, has MRI set up to rule out back issues     Today, he presents holding his upper leg and complains of pain with hip movement, david rotation.   He is in wheelchair.  No swelling of extremity     Past Medical History:   Diagnosis Date    Anemia due to unknown mechanism 11/10/2015    Angina pectoris 2014    not since    Arthritis     Back pain     Coronary artery disease     Diabetes mellitus 20 years     am 09/29/2014    Diabetes mellitus type II 1994     am 12/22/2015    DM (diabetes mellitus) 1994    BS 78 am 01/10/2017    Gout 12/05/2017    right foot     Hyperlipemia     Hypertension     CHARLES (obstructive sleep apnea)     Spinal cord disease     Trouble in sleeping     Type 2 diabetes mellitus with diabetic polyneuropathy, without long-term current use of insulin     Uses hearing aid     bilat       Current Outpatient Prescriptions   Medication Sig Dispense Refill     acetaminophen (TYLENOL) 650 MG TbSR Take 1,300 mg by mouth every 8 (eight) hours.      blood sugar diagnostic (ACCU-CHEK FRANKO PLUS TEST STRP) Strp USE  1  STRIP ONE TIME DAILY 100 strip 4    clonazePAM (KLONOPIN) 0.25 MG TbDL DISSOLVE 2 TABLETS BY MOUTH NIGHTLY 90 tablet 0    docusate sodium (COLACE) 100 MG capsule Take 1 capsule (100 mg total) by mouth 2 (two) times daily as needed. 60 capsule 0    gabapentin (NEURONTIN) 100 MG capsule TAKE 1 CAPSULE BY MOUTH EVERY NIGHT AT BEDTIME FOR 1 WEEK, THEN INCREASE TO 2 CAPSULES EVERY NIGHT AT BEDTIME THEREAFTER 180 capsule 1    glipiZIDE (GLUCOTROL) 5 MG tablet TAKE 2 TABLETS (10 MG) TWO TIMES DAILY BEFORE MEALS 360 tablet 4    lancets (ACCU-CHEK MULTICLIX LANCET) Misc TEST ONE TIME DAILY 100 each 3    losartan-hydrochlorothiazide 50-12.5 mg (HYZAAR) 50-12.5 mg per tablet TAKE 1 TABLET ONE TIME DAILY 90 tablet 4    metformin (GLUCOPHAGE) 1000 MG tablet TAKE 1 TABLET TWICE DAILY WITH MEALS 180 tablet 4    metoprolol succinate (TOPROL-XL) 50 MG 24 hr tablet TAKE 1 TABLET EVERY DAY 90 tablet 3    multivitamin (ONE DAILY MULTIVITAMIN) per tablet Take 1 tablet by mouth once daily.      oxyCODONE-acetaminophen (PERCOCET)  mg per tablet Take 1 tablet by mouth every 4 (four) hours as needed. 60 tablet 0    oxyCODONE-acetaminophen (PERCOCET)  mg per tablet Take 1 tablet by mouth every 4 (four) hours as needed. 90 tablet 0    pramipexole (MIRAPEX) 0.25 MG tablet TAKE 1 TABLET BY MOUTH EVERY EVENING 30 tablet 5    pravastatin (PRAVACHOL) 40 MG tablet TAKE 1 TABLET EVERY DAY 90 tablet 4    sildenafil (VIAGRA) 100 MG tablet Take 1 tablet (100 mg total) by mouth as needed. Take on an empty stomach 6 tablet 11    colchicine 0.6 mg tablet Take 2 pills now, then take 1 pill 2 hours later. For a total of 3 pills. 3 tablet 0     No current facility-administered medications for this visit.        Review of Systems   Constitutional: Positive for activity  "change. Negative for fatigue, fever and unexpected weight change.   HENT: Negative for facial swelling, trouble swallowing and voice change.    Eyes: Negative.  Negative for visual disturbance.   Respiratory: Negative for cough, chest tightness and shortness of breath.    Gastrointestinal: Negative.  Negative for abdominal distention, abdominal pain and blood in stool.   Endocrine: Negative for cold intolerance, polydipsia and polyuria.   Genitourinary: Negative for difficulty urinating, flank pain and penile pain.   Musculoskeletal: Positive for arthralgias, back pain and gait problem. Negative for neck stiffness.   Skin: Negative for color change and rash.   Allergic/Immunologic: Negative for immunocompromised state.   Neurological: Negative for dizziness, tingling, seizures, facial asymmetry, speech difficulty and numbness.   Hematological: Negative for adenopathy. Does not bruise/bleed easily.   Psychiatric/Behavioral: Positive for agitation. Negative for suicidal ideas.       Objective:   BP (!) 140/84   Pulse 84   Temp 97.1 °F (36.2 °C)   Ht 6' 3" (1.905 m)   Wt 96.2 kg (212 lb)   SpO2 97%   BMI 26.50 kg/m²      Physical Exam   Constitutional: He appears well-developed and well-nourished. He appears distressed.   Musculoskeletal:        Legs:        Lab Results   Component Value Date    WBC 5.22 12/05/2017    HGB 11.0 (L) 12/05/2017    HCT 34.0 (L) 12/05/2017     12/05/2017    CHOL 142 03/07/2017    TRIG 135 03/07/2017    HDL 28 (L) 03/07/2017    ALT 26 10/26/2017    AST 21 10/26/2017     11/02/2017    K 4.4 11/02/2017     11/02/2017    CREATININE 1.3 11/02/2017    BUN 17 11/02/2017    CO2 26 11/02/2017    TSH 1.362 12/23/2011    PSA 0.99 02/23/2016    INR 0.9 07/19/2016    GLUF 106 08/22/2011    HGBA1C 6.5 (H) 10/09/2017       Assessment:       1. Acute postoperative pain of left hip        Plan:   Acute postoperative pain of left hip    Pain seems to be present mainly in left groin, " elicited by hip manipulation, radiating to thigh and knee   toradol inj today   Patient already on strong opioids.  Need ortho opinion, concern for issue with hip   Discussed case with PCP as well   Getting patient seen by ortho asap   -     ketorolac injection 30 mg; Inject 1 mL (30 mg total) into the muscle one time.

## 2017-12-20 ENCOUNTER — HOSPITAL ENCOUNTER (OUTPATIENT)
Dept: RADIOLOGY | Facility: HOSPITAL | Age: 77
Discharge: HOME | End: 2017-12-20
Attending: ORTHOPAEDIC SURGERY
Payer: MEDICARE

## 2017-12-20 DIAGNOSIS — M54.42 CHRONIC BILATERAL LOW BACK PAIN WITH BILATERAL SCIATICA: ICD-10-CM

## 2017-12-20 DIAGNOSIS — G89.29 CHRONIC BILATERAL LOW BACK PAIN WITH BILATERAL SCIATICA: ICD-10-CM

## 2017-12-20 DIAGNOSIS — M54.41 CHRONIC BILATERAL LOW BACK PAIN WITH BILATERAL SCIATICA: ICD-10-CM

## 2017-12-20 PROCEDURE — A9585 GADOBUTROL INJECTION: HCPCS | Performed by: ORTHOPAEDIC SURGERY

## 2017-12-20 PROCEDURE — 72158 MRI LUMBAR SPINE W/O & W/DYE: CPT | Mod: TC

## 2017-12-20 PROCEDURE — 25500020 PHARM REV CODE 255: Performed by: ORTHOPAEDIC SURGERY

## 2017-12-20 RX ORDER — GADOBUTROL 604.72 MG/ML
9 INJECTION INTRAVENOUS
Status: COMPLETED | OUTPATIENT
Start: 2017-12-20 | End: 2017-12-20

## 2017-12-20 RX ADMIN — GADOBUTROL 9 ML: 604.72 INJECTION INTRAVENOUS at 05:12

## 2017-12-21 ENCOUNTER — OFFICE VISIT (OUTPATIENT)
Dept: ORTHOPEDICS | Facility: CLINIC | Age: 77
End: 2017-12-21
Payer: MEDICARE

## 2017-12-21 VITALS
SYSTOLIC BLOOD PRESSURE: 113 MMHG | WEIGHT: 212.06 LBS | BODY MASS INDEX: 26.37 KG/M2 | HEART RATE: 67 BPM | HEIGHT: 75 IN | DIASTOLIC BLOOD PRESSURE: 66 MMHG

## 2017-12-21 DIAGNOSIS — M47.816 LUMBAR SPONDYLOSIS: Primary | ICD-10-CM

## 2017-12-21 PROCEDURE — 99214 OFFICE O/P EST MOD 30 MIN: CPT | Mod: 24,S$GLB,, | Performed by: ORTHOPAEDIC SURGERY

## 2017-12-21 PROCEDURE — 99999 PR PBB SHADOW E&M-EST. PATIENT-LVL III: CPT | Mod: PBBFAC,,, | Performed by: ORTHOPAEDIC SURGERY

## 2017-12-21 RX ORDER — FERROUS SULFATE 324(65)MG
325 TABLET, DELAYED RELEASE (ENTERIC COATED) ORAL DAILY
COMMUNITY
End: 2018-11-07

## 2017-12-21 RX ORDER — OXYCODONE HCL 10 MG/1
10 TABLET, FILM COATED, EXTENDED RELEASE ORAL EVERY 12 HOURS PRN
Qty: 90 TABLET | Refills: 0 | Status: SHIPPED | OUTPATIENT
Start: 2017-12-21 | End: 2018-04-19

## 2017-12-22 PROBLEM — M47.816 LUMBAR SPONDYLOSIS: Status: ACTIVE | Noted: 2017-12-22

## 2017-12-22 NOTE — PROGRESS NOTES
CC: This is a 77-year-old male that complains of an radiating down the left thigh down to the foot.  The patient is status post left total hip replacement.    HPI: The patient has been treated with walker assisted ambulation.    PMH:    Past Medical History:   Diagnosis Date    Anemia due to unknown mechanism 11/10/2015    Angina pectoris 2014    not since    Arthritis     Back pain     Coronary artery disease     Diabetes mellitus 20 years     am 09/29/2014    Diabetes mellitus type II 1994     am 12/22/2015    DM (diabetes mellitus) 1994    BS 78 am 01/10/2017    Gout 12/05/2017    right foot     Hyperlipemia     Hypertension     CHARLES (obstructive sleep apnea)     Spinal cord disease     Trouble in sleeping     Type 2 diabetes mellitus with diabetic polyneuropathy, without long-term current use of insulin     Uses hearing aid     bilat       PSH:    Past Surgical History:   Procedure Laterality Date    BACK SURGERY      HERNIA REPAIR Bilateral 04/18/2017    JOINT REPLACEMENT Left 10/09/2017    L YAHIR Dr. Alcocer    LAMINECTOMY  07/22/2016    ROTATOR CUFF REPAIR Left 2006    SHOULDER ARTHROSCOPY W/ ROTATOR CUFF REPAIR Right 2009       Family Hx:    Family History   Problem Relation Age of Onset    Hypertension Mother     Heart disease Mother     Diabetes Mother     Hypertension Father     Heart disease Father     Diabetes Father     Stroke Father     Diabetes Sister     Cancer Brother 50     pancreas    Drug abuse Brother     Prostate cancer Neg Hx     Macular degeneration Neg Hx     Retinal detachment Neg Hx     Strabismus Neg Hx     Glaucoma Neg Hx     Blindness Neg Hx     Amblyopia Neg Hx     Kidney disease Neg Hx     Mental illness Neg Hx     Mental retardation Neg Hx     COPD Neg Hx     Asthma Neg Hx        Allergy:    Review of patient's allergies indicates:   Allergen Reactions    Sulfa (sulfonamide antibiotics)      Can't recall from 1995        Medication:    Current Outpatient Prescriptions:     acetaminophen (TYLENOL) 650 MG TbSR, Take 1,300 mg by mouth every 8 (eight) hours., Disp: , Rfl:     blood sugar diagnostic (ACCU-CHEK FRANKO PLUS TEST STRP) Strp, USE  1  STRIP ONE TIME DAILY, Disp: 100 strip, Rfl: 4    clonazePAM (KLONOPIN) 0.25 MG TbDL, DISSOLVE 2 TABLETS BY MOUTH NIGHTLY, Disp: 90 tablet, Rfl: 0    colchicine 0.6 mg tablet, Take 2 pills now, then take 1 pill 2 hours later. For a total of 3 pills., Disp: 3 tablet, Rfl: 0    docusate sodium (COLACE) 100 MG capsule, Take 1 capsule (100 mg total) by mouth 2 (two) times daily as needed., Disp: 60 capsule, Rfl: 0    ferrous sulfate 324 mg (65 mg iron) TbEC, Take 325 mg by mouth once daily., Disp: , Rfl:     gabapentin (NEURONTIN) 100 MG capsule, TAKE 1 CAPSULE BY MOUTH EVERY NIGHT AT BEDTIME FOR 1 WEEK, THEN INCREASE TO 2 CAPSULES EVERY NIGHT AT BEDTIME THEREAFTER, Disp: 180 capsule, Rfl: 1    glipiZIDE (GLUCOTROL) 5 MG tablet, TAKE 2 TABLETS (10 MG) TWO TIMES DAILY BEFORE MEALS, Disp: 360 tablet, Rfl: 4    lancets (ACCU-CHEK MULTICLIX LANCET) Misc, TEST ONE TIME DAILY, Disp: 100 each, Rfl: 3    losartan-hydrochlorothiazide 50-12.5 mg (HYZAAR) 50-12.5 mg per tablet, TAKE 1 TABLET ONE TIME DAILY, Disp: 90 tablet, Rfl: 4    metformin (GLUCOPHAGE) 1000 MG tablet, TAKE 1 TABLET TWICE DAILY WITH MEALS, Disp: 180 tablet, Rfl: 4    metoprolol succinate (TOPROL-XL) 50 MG 24 hr tablet, TAKE 1 TABLET EVERY DAY, Disp: 90 tablet, Rfl: 3    multivitamin (ONE DAILY MULTIVITAMIN) per tablet, Take 1 tablet by mouth once daily., Disp: , Rfl:     oxyCODONE-acetaminophen (PERCOCET)  mg per tablet, Take 1 tablet by mouth every 4 (four) hours as needed., Disp: 90 tablet, Rfl: 0    pravastatin (PRAVACHOL) 40 MG tablet, TAKE 1 TABLET EVERY DAY, Disp: 90 tablet, Rfl: 4    oxyCODONE (OXYCONTIN) 10 mg 12 hr tablet, Take 1 tablet (10 mg total) by mouth every 12 (twelve) hours as needed for Pain.,  "Disp: 90 tablet, Rfl: 0    oxyCODONE-acetaminophen (PERCOCET)  mg per tablet, Take 1 tablet by mouth every 4 (four) hours as needed., Disp: 60 tablet, Rfl: 0    pramipexole (MIRAPEX) 0.25 MG tablet, TAKE 1 TABLET BY MOUTH EVERY EVENING, Disp: 30 tablet, Rfl: 5    sildenafil (VIAGRA) 100 MG tablet, Take 1 tablet (100 mg total) by mouth as needed. Take on an empty stomach, Disp: 6 tablet, Rfl: 11    Social History:    Social History     Social History    Marital status:      Spouse name: N/A    Number of children: 0    Years of education: N/A     Occupational History    retired      teacher     Social History Main Topics    Smoking status: Never Smoker    Smokeless tobacco: Never Used    Alcohol use 0.6 oz/week     1 Glasses of wine per week      Comment: rarely  No alcohol prior to surgery    Drug use: No    Sexual activity: Yes     Partners: Female     Birth control/ protection: Condom     Other Topics Concern    Not on file     Social History Narrative    Single lives alone. No children. Retired but some part time work - 4 hours a day. 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 Caffeine intake - sugar free cola, Rare coffee intake. Still drives. Does have a Living Will . Has a nephew Jt Gayle, lives in [place]. Has a friend Karina Rossi, locally who could help him.        Vitals:   /66   Pulse 67   Ht 6' 3" (1.905 m)   Wt 96.2 kg (212 lb 1.3 oz)   BMI 26.51 kg/m²      ROS:  GENERAL: No fever, chills, fatigability or weight loss.  SKIN: No rashes, itching or changes in color or texture of skin.  HEAD: No headaches or recent head trauma.  EYES: Visual acuity fine. No photophobia, ocular pain or diplopia.  EARS: Denies ear pain, discharge or vertigo.  NOSE: No loss of smell, no epistaxis or postnasal drip.  MOUTH & THROAT: No hoarseness or change in voice. No excessive gum bleeding.  NODES: Denies swollen glands.  CHEST: Denies DAMON, cyanosis, wheezing, cough and sputum " production.  CARDIOVASCULAR: Denies chest pain, PND, orthopnea or reduced exercise tolerance.  ABDOMEN: Appetite fine. No weight loss. Denies diarrhea, abdominal pain, hematemesis or blood in stool.  URINARY: No flank pain, dysuria or hematuria.  PERIPHERAL VASCULAR: No claudication or cyanosis.  NEUROLOGIC: No history of seizures, paralysis, alteration of gait or coordination.  MUSCULOSKELETAL: See HPI    PE:  APPEARANCE: Well nourished, well developed, in no acute distress.   HEAD: Normocephalic, atraumatic.  EYES: PERRL. EOMI.   EARS: TM's intact. Light reflex normal. No retraction or perforation.   NOSE: Mucosa pink. Airway clear.  MOUTH & THROAT: No tonsillar enlargement. No pharyngeal erythema or exudate. No stridor.  NECK: Supple.   NODES: No cervical, axillary or inguinal lymph node enlargement.  CHEST: Lungs clear to auscultation.  CARDIOVASCULAR: Normal S1, S2. No rubs, murmurs or gallops.  ABDOMEN: Bowel sounds normal. Not distended. Soft. No tenderness or masses.  NEUROLOGIC: Cranial Nerves: II-XII grossly intact, also see MUSCULOSKELETAL     Lumbar spine-decreased range of motion, symmetrical deep tendon reflexes and motor strength as well as sensory examination bilateral lower extremities.       Left   Hip   -  2 plus dorsalis pedis and posterior tibial artery pulses, light touch intact Left lower extremity.  All digits are warm. No erythema, no warmth, no drainage, no swelling, no significant tenderness.  Less than 2 seconds capillary refill all digits.           Assessment:  I called Dr. Iqbal and left a message, in an effort to expedite the patient's referral. I reviewed the patient's lumbar spine MRI findings.           Diagnosis:              1.  Status post left total hip replacement               2.  Lumbar radiculopathy    Diagnostic Studies  MRI-No  X-Ray-No  EMG/NCV-No  Arthrogram-No  Bone Scan-No  CT Scan-No  Doppler-No  ESR-No  CRP-No  CBC with Diff-No   Rheumatoid/Arthritis  Panel-No      Plan:                                                 1. PT-yes                                                 2.OT-no                                          3.NSAID-yes                                        4. Narcotics-no                                     5. Wound care-N/A                                 6. Rest-yes                                           7. Surgery-no                                         8. BA Hose-no                                    9. Anticoagulation therapy-no               10. Elevation-no                                     11. Crutches-no                                    12. Walker-no             13. Cane no                        14. Referral-to physiatry or a spine surgeon for lumbar spine problems.                                     15.Injection-no                            16. Splint   /    Cast   /   Cast Shoe-No              17. RICE-none            18. Follow up- 3 months

## 2017-12-22 NOTE — PATIENT INSTRUCTIONS
Degenerative Disk Disease    Spinal disks are gel-filled cushions between the bones, or vertebrae, of the spine. The disks act like shock absorbers. Over time, the disks may break down. This is called degenerative disk disease.  This condition can affect the neck or back. It is one of the most common causes of low back pain. It is the leading cause of disability in people under age 45 in the [place]. The pain often remains localized to the lower back or neck. Muscle spasm is often present and adds to the pain.  Disk degeneration is a natural part of aging. But it is not painful for most people. It may also occur as a result of repeated minor injuries due to daily activities, sports, or accidents. It may lead to osteoarthritis of the spine. Back pain related to disk disease may come and go. Or it may become chronic and last for months or years. The disk may bulge or rupture. This is called a slipped disk or herniated disk. That can put pressure on a nearby spinal nerve and cause neck or back pain that spreads down one arm or leg.  X-rays or an MRI may help to diagnose this condition. For acute pain, treatment includes anti-inflammatory medicines, muscle relaxants, rest, ice, or heat. Strong prescription pain medicines, called opioids, may be needed for short-term treatment if pain suddenly gets worse. Opioid medicines can be addictive. So they are not advised for long-term pain management. Other types of medicines are preferred. Surgery is generally not used to treat this condition unless there is a complication.    Home care  · For neck pain: Use a comfortable pillow that supports the head and keeps the spine in a neutral position. Your head should not be tilted forward or backward.  · For back pain: Avoid sitting for long periods of time. This puts more stress on the lower back than standing or walking. Starting a regular exercise program to strengthen the supporting muscles of the spine will make it easier  to live with degenerative disk disease.  · Apply an ice pack over the injured area for no more than 15 to 20 minutes. Do this every 3 to 6 hours for the first 24 to 48 hours. To make an ice pack, put ice cubes in a plastic bag that seals at the top. Wrap the bag in a clean, thin towel or cloth. Never put ice or an ice pack directly on the skin. Keep using ice packs to ease pain and swelling as needed. After 48 hours, apply heat (warm shower or warm bath) for 20 minutes several times a day, or switch between ice and heat.   · You may use over-the-counter pain medicine to control pain, unless another pain medicine was prescribed. If you have chronic liver or kidney disease or ever had a stomach ulcer or GI bleeding, talk with your provider before using these medicines.  Follow-up care  Follow up with your healthcare provider, or as directed.  If X-rays, a CT scan or an MRI were done, you will be notified of any new findings that may affect your care.  When to seek medical advice  Contact your healthcare provider right away if any of these occur:  · Increasing back pain  · Your foot drags when you walk, a condition called foot drop  · You have new weakness, numbness, or pain in one or both arms or legs  · Loss of bowel or bladder control  · Numbness or tingling in the buttock or groin area  Date Last Reviewed: 11/23/2015  © 9248-8142 [employer]. [address] [place] [address], [place] [address]. All rights reserved. This information is not intended as a substitute for professional medical care. Always follow your healthcare professional's instructions.

## 2017-12-28 ENCOUNTER — TELEPHONE (OUTPATIENT)
Dept: URGENT CARE | Facility: CLINIC | Age: 77
End: 2017-12-28

## 2017-12-28 ENCOUNTER — OFFICE VISIT (OUTPATIENT)
Dept: URGENT CARE | Facility: CLINIC | Age: 77
End: 2017-12-28
Payer: MEDICARE

## 2017-12-28 VITALS
DIASTOLIC BLOOD PRESSURE: 80 MMHG | SYSTOLIC BLOOD PRESSURE: 120 MMHG | BODY MASS INDEX: 26.37 KG/M2 | OXYGEN SATURATION: 98 % | WEIGHT: 211 LBS | HEART RATE: 81 BPM | TEMPERATURE: 97 F

## 2017-12-28 DIAGNOSIS — M10.071 ACUTE IDIOPATHIC GOUT OF RIGHT FOOT: Primary | ICD-10-CM

## 2017-12-28 PROCEDURE — 99999 PR PBB SHADOW E&M-EST. PATIENT-LVL III: CPT | Mod: PBBFAC,,, | Performed by: FAMILY MEDICINE

## 2017-12-28 PROCEDURE — 99214 OFFICE O/P EST MOD 30 MIN: CPT | Mod: S$GLB,,, | Performed by: FAMILY MEDICINE

## 2017-12-28 RX ORDER — COLCHICINE 0.6 MG/1
TABLET ORAL
Qty: 20 TABLET | Refills: 0 | Status: SHIPPED | OUTPATIENT
Start: 2017-12-28 | End: 2018-01-23

## 2017-12-28 NOTE — PROGRESS NOTES
Subjective:       Patient ID: Srinath Mcknight is a 77 y.o. male.    Chief Complaint: Foot Pain    /80 (BP Location: Right arm, Patient Position: Sitting, BP Method: Large (Manual))   Pulse 81   Temp 96.7 °F (35.9 °C) (Tympanic)   Wt 95.7 kg (210 lb 15.7 oz)   SpO2 98%   BMI 26.37 kg/m²     HPI  Gout flare up in right big toe. lst flare up was 3-4 weeks ago. Given 3 colchicine, helped. Wishing for something to keep it away.  PMH: left elbow surgery for gouty arthritis  Lab showed elevated uric acid 12/2017  Has appt with PCP 1/12/2018    Review of Systems   Constitutional: Positive for activity change.   Musculoskeletal: Positive for arthralgias and joint swelling.       Objective:      Physical Exam   Constitutional: He is oriented to person, place, and time. He appears well-developed and well-nourished. No distress.   HENT:   Head: Normocephalic and atraumatic.   Eyes: EOM are normal. Pupils are equal, round, and reactive to light.   Neck: Normal range of motion. Neck supple.   Cardiovascular: Normal rate.    Pulmonary/Chest: Effort normal.   Musculoskeletal:   Right 1st MTP tender to touch, mildly erythematous and swollen   Neurological: He is alert and oriented to person, place, and time. No cranial nerve deficit.       Assessment:       1. Acute idiopathic gout of right foot        Plan:     [place] was seen today for foot pain.    Diagnoses and all orders for this visit:    Acute idiopathic gout of right foot  -     colchicine 0.6 mg tablet; Take one pill every 2 hours till pain free. May take up to 6 pills a day.              Discussed with pt all information and results pertaining to this visit. Discussed dx and plan of tx.  All questions and concerns were addressed at this time. Pt expresses understanding of information and instructions.  Care and follow up instruction given to patient.

## 2017-12-28 NOTE — TELEPHONE ENCOUNTER
----- Message from Ana Ponce sent at 12/28/2017  3:50 PM CST -----  Contact: [facility]  Request a call concerning the pt prescription on Colchicine, the pt is at the pharmacist now and is in pain, can be reached at [phone]///[id number]

## 2018-01-04 ENCOUNTER — OFFICE VISIT (OUTPATIENT)
Dept: PAIN MEDICINE | Facility: CLINIC | Age: 78
End: 2018-01-04
Payer: MEDICARE

## 2018-01-04 VITALS
SYSTOLIC BLOOD PRESSURE: 112 MMHG | WEIGHT: 210 LBS | HEART RATE: 68 BPM | BODY MASS INDEX: 26.11 KG/M2 | HEIGHT: 75 IN | DIASTOLIC BLOOD PRESSURE: 61 MMHG | RESPIRATION RATE: 16 BRPM

## 2018-01-04 DIAGNOSIS — M54.16 LUMBAR RADICULOPATHY: ICD-10-CM

## 2018-01-04 DIAGNOSIS — M47.816 LUMBAR SPONDYLOSIS: Primary | ICD-10-CM

## 2018-01-04 PROCEDURE — 99999 PR PBB SHADOW E&M-EST. PATIENT-LVL III: CPT | Mod: PBBFAC,,, | Performed by: ANESTHESIOLOGY

## 2018-01-04 PROCEDURE — 99204 OFFICE O/P NEW MOD 45 MIN: CPT | Mod: S$GLB,,, | Performed by: ANESTHESIOLOGY

## 2018-01-04 RX ORDER — GABAPENTIN 300 MG/1
CAPSULE ORAL
Qty: 90 CAPSULE | Refills: 0 | Status: SHIPPED | OUTPATIENT
Start: 2018-01-04 | End: 2018-03-20 | Stop reason: SDUPTHER

## 2018-01-04 NOTE — PATIENT INSTRUCTIONS
Understanding Lumbar Radiculopathy    Lumbar radiculopathy is irritation or inflammation of a nerve root in the low back. It causes symptoms that spread out from the back down one or both legs. To understand this condition, it helps to understand the parts of the spine:  · Vertebrae. These are bones that stack to form the spine. The lumbar spine contains the 5 bottom vertebrae.  · Disks. These are soft pads of tissue between the vertebrae. They act as shock absorbers for the spine.  · Spinal canal. This is a tunnel formed within the stacked vertebrae. In the lumbar spine, nerves run through this canal.  · Nerves. These branch off and leave the spinal canal, traveling out to parts of the body. As they leave the spinal canal, nerves pass through openings between the vertebrae. The nerve root is the part of the nerve that is closest to the spinal canal.  · Sciatic nerve. This is a large nerve formed from several nerve roots in the low back. This nerve extends down the back of the leg to the foot.  With lumbar radiculopathy, nerve roots in the low back become irritated. This leads to pain and symptoms. The sciatic nerve is commonly involved, so the condition is often called sciatica.  What causes lumbar radiculopathy?  Aging, injury, poor posture, extra body weight, and other issues can lead to problems in the low back. These problems may then irritate nerve roots. They include:  · Damage to a disk in the lumbar spine. The damaged disk may then press on nearby nerve roots.  · Degeneration from wear and tear, and aging. This can lead to narrowing (stenosis) of the openings between the vertebrae. The narrowed openings press on nerve roots as they leave the spinal canal.  · Unstable spine. This is when a vertebra slips forward. It can then press on a nerve root.  Other, less common things can put pressure on nerves in the low back. These include diabetes, infection, or a tumor.  Symptoms of lumbar radiculopathy  These  include:  · Pain in the low back  · Pain, numbness, tingling, or weakness that travels into the buttocks, hip, groin, or leg  · Muscle spasms  Treatment for lumbar radiculopathy  In most cases, your healthcare provider will first try treatments that help relieve symptoms. These may include:  · Prescription and over-the-counter pain medicines. These help relieve pain, swelling, and irritation.  · Limits on positions and activities that increase pain. But lying in bed or avoiding all movement is only recommended for a short period of time.  · Physical therapy, including exercises and stretches. This helps decrease pain and increase movement and function.  · Steroid shots into the lower back. This may help relieve symptoms for a time.  · Weight-loss program. If you are overweight, losing extra pounds may help relieve symptoms.  In some cases, you may need surgery to fix the underlying problem. This depends on the cause, the symptoms, and how long the pain has lasted.  Possible complications  Over time, an irritated and inflamed nerve may become damaged. This may lead to long-lasting (permanent) numbness or weakness in your legs and feet. If symptoms change suddenly or get worse, be sure to let your healthcare provider know.  When to call your healthcare provider  Call your healthcare provider right away if you have any of these:  · New pain or pain that gets worse  · New or increasing weakness, tingling, or numbness in your leg or foot  · Problems controlling your bladder or bowel   Date Last Reviewed: 3/10/2016  © 2525-5722 [employer] [address] [place] [address], [place] [address]. All rights reserved. This information is not intended as a substitute for professional medical care. Always follow your healthcare professional's instructions.

## 2018-01-04 NOTE — LETTER
January 7, 2018      Albaro Alcocer Sr., MD  [address]  [place] [address]           [facility]  [address]  [place] [address]  Phone: [phone]  Fax: [phone]          Patient: Srinath Mcknight   MR Number: [id number]   YOB: 1940   Date of Visit: 1/4/2018       Dear Dr. Albaro Alcocer Sr.:    Thank you for referring Srinath Mcknight to me for evaluation. Attached you will find relevant portions of my assessment and plan of care.    If you have questions, please do not hesitate to call me. I look forward to following Srinath Mcknight along with you.    Sincerely,    Erik Roldan MD    Enclosure  CC:  No Recipients    If you would like to receive this communication electronically, please contact [email or web address] or [phone] to request more information on [facility] access.    For providers and/or their staff who would like to refer a patient to [facility], please contact us through our one-stop-shop provider referral line, [facility] , at [phone].    If you feel you have received this communication in error or would no longer like to receive these types of communications, please e-mail [email or web address]

## 2018-01-05 DIAGNOSIS — G47.50 PARASOMNIA: ICD-10-CM

## 2018-01-07 NOTE — PROGRESS NOTES
Chief Pain Complaint:  Left Lumbar Back pain with radiation down left leg      History of Present Illness:   Srinath Mcknight is a 77 y.o. male  who is presenting with a chief complaint of Left Lumbar Back pain with radiation down left leg. The patient began experiencing this problem insidiously, and the pain has been gradually worsening over the past 3 month(s). The pain is described as throbbing, shooting, burning and electrical and is located in the left lumbar spine. Pain is intermittent and lasts hours. The pain radiates to  left leg anterior thigh and shin. The patient rates his pain a 8 out of ten and interferes with activities of daily living a 7 out of ten. Pain is exacerbated by flexion of the lumbar spine, and is improved by rest. Patient reports no prior trauma,  prior spinal surgery Laminectomy at L4-L5, prior hip surgery Left total hip replacement.     - pertinent negatives: No fever, No chills, No weight loss, No bladder dysfunction, No bowel dysfunction, No saddle anesthesia  - pertinent positives: left leg weakness    - medications, other therapies tried (physical therapy, injections):     >> NSAIDs, Tylenol, Percocet and oxycodone    >> Has previously undergone Physical Therapy    >> Has NOT previously undergone spinal injection/s      Imaging / Labs / Studies (reviewed on 1/7/2018):    Results for orders placed during the hospital encounter of 05/02/16   MRI Lumbar Spine Without Contrast    Narrative MRI OF THE LUMBAR SPINE WITHOUT CONTRAST.     Technique:  Sagittal T1, sagittal T2, sagittal STIR, axial T1 and axial T2 weighted images of the lumbar spine obtained without contrast.     Comparison: MRI Lumbar spine without contrast from 11/12/14     Findings:  The there is minimal (grade I) the retrolisthesis of L5 on S1.  Lumbar spinal alignment is otherwise within normal limits.  There is no evidence of acute fracture.  Vertebral body heights are maintained.  No abnormal marrow signals identified  to suggest an infiltrative process.  The conus is normal in appearance and terminates at the L1 level.    There is intervertebral disc space narrowing and desiccation of discs within the lower lumbar spine.  There findings of multilevel lumbar spondylosis as detailed below.    L1-L2: No significant central canal stenosis or neuroforaminal narrowing.    L2-L3: There is a mild broad-based disc bulge without significant central canal stenosis or neuroforaminal narrowing.     L3-L4: There is a broad-based disc bulge and bilateral facet arthropathy with associated synovial cyst arising from the left facet joint measuring approximately 5 x 9 mm, similar to the prior examination.  This results in moderate central canal stenosis, slightly progressed from the prior examination.  There is mild bilateral neuroforaminal narrowing.     L4-L5: There is a broad-based disc bulge and prominent bilateral facet arthropathy resulting in severe central canal stenosis and mild bilateral neuroforaminal narrowing.     L5-S1: There is a broad-based disc bulge and a bilateral facet arthropathy resulting in mild bilateral neuroforaminal narrowing.  No significant central canal stenosis.     The visualized intra-abdominal contents demonstrate no significant abnormalities.  The bilateral SI joints are unremarkable.  The paraspinal musculature is within normal limits.    Impression      1.  Lumbar spondylosis resulting in moderate central canal stenosis at L3-L4 and severe central canal stenosis at L4-L5.  Mild neuroforaminal narrowing noted at L3-L4 through L5-S1.  Findings are slightly progressed from the prior examination from 11/12/14.    2.  Prominent facet arthropathy noted within the lower lumbar spine with a grossly stable appearing synovial cyst arising from the left facet joint at L3-L4.    ______________________________________     Electronically signed by resident: Eduar Perdomo  Date:     05/02/16  Time:    10:40        ______________________________________     Electronically signed by: Abel Nieves MD  Date:     05/02/16  Time:    10:49      Results for orders placed during the hospital encounter of 12/20/17   MRI Lumbar Spine W WO Contrast    Narrative EXAM: [id number] LUMBAR SPINE W WO CONTRAST    CLINICAL HISTORY: Left sided sciatic pain.  Aching and shooting pain 9-10 severity.    TECHNIQUE: MR Lumbar spine with contrast. Sagittal T1, T2, STIR. Axial T1, T2. Post contrast Sagittal and Axial T1.    COMPARISON: Previous MRI of the lumbar spine from 05/02/2016.    FINDINGS:    Since the previous study there has been a laminectomy at L4-L5. There is enhancement of granulation tissue at the operative site.  No abnormal enhancement surrounds the exiting nerve roots.  The thecal sac measures 9 mm AP at this level.  There is slight mass effect upon the lateral recess without definite compression of the descending L5 nerve roots. There is a synovial cyst with peripheral enhancement projecting centrally and inferiorly from the left facet joint at L3-L4 that demonstrates peripheral enhancement.  It causes mild spinal stenosis at the level of L4 and is slightly larger than on the previous study from May 2016. The conus medullaris terminates at the level ofL1-L2. No abnormal signal within the conus. Intervertebral disc levels are as follows:    T12-L1 disc: No significant disc pathology. No spinal canal or neural foraminal stenosis.    L1-L2 disc : No significant disc pathology. No spinal canal or neural foraminal stenosis.    L2-L3 disc: Normal disc height with mild degenerative facet changes and thickening of ligamentum flavum.  No significant canal or foraminal stenosis.    L3-L4 disc: Partial distal desiccation with a broad-based posterior disc bulge.  There is a far left lateral annular fissure of the disc.  Moderate to severe degenerative facet change with bilateral facet joint effusions.  The thecal sac measures 10 mm AP but is  about 50% of normal cross-sectional area.  Disc material bulges into the floor of the left exit foramen and causes mild to moderate left foraminal stenosis.  There is minimal right foraminal stenosis.  Additionally, there is a synovial cyst projects centrally and inferiorly from the left facet joint.  The synovial cyst demonstrates peripheral enhancement and measures 11 mm craniocaudal by 11 mm transverse by 5 mm AP.  It is best demonstrated on axial T2 series 6 image 23.  It causes mild thecal sac stenosis without definite neural compression.  There is a smaller synovial cyst projecting toward the left as well, remote from any neural structures.    L4-L5 disc: Level of interval laminectomy.  There is enhancing granulation tissue at the operative site without enhancement surrounding the exiting nerve roots.  The thecal sac measures 9 mm AP.  There is mild mass effect upon the lateral recesses without definite compression no descending L5 nerve roots.  Minimal bilateral foraminal stenosis.    L5-S1 disc: Partial does desiccation and disc space height loss.  Height loss is most severe toward the left where there are marginal osteophytes.  Osteophyte material encroaches into the floor of the left exit foramen causing moderate to severe left foraminal stenosis.  The right neural foramen is minimally narrowed.  Moderate degenerative facet change.  The thecal sac measures 12 mm AP.    Impression      1.  There has been an interval laminectomy at L4-L5.  There is enhancing granulation tissue at the operative site.  There is diminished spinal stenosis at this level compared to the previous exam.    2.  Enhancement surrounds a synovial cyst that projects centrally and inferiorly from the left L3-L4 facet joint and causes mild spinal stenosis.  The cyst has increased in size compared to the previous examination.    3.  There is mild to moderate spinal stenosis at L3-L4 where the thecal sac is about 50% of normal  cross-sectional area, predominantly related to hypertrophy of the posterior elements.    4.  There is a far left lateral annular fissure of L3-L4 disc.    5.  Moderate to severe left-sided foraminal stenosis at L5-S1.        Electronically signed by: CHENCHO MARTIN M.D.  Date:     12/21/17  Time:    09:34      Results for orders placed during the hospital encounter of 09/03/14   X-Ray Lumbar Spine Complete 5 View    Narrative Findings:     As compared with 11/18/09, no significant interim change is identified.  Diffuse chronic changes again demonstrated as previously described.    Impression      No significant interim change appreciated as compared with 11/18/09.      Electronically signed by: SNEHA ELISE MD  Date:     09/03/14  Time:    17:10        Results for orders placed during the hospital encounter of 04/26/16   X-Ray Lumbar Spine Ap Lateral w/Flex Ext    Narrative 4 views: Alignment is normal.  There is mild-moderate DJD.  No instability seen.  No fracture dislocation bone destruction seen.    Impression  Chronic change as above.  No change from the prior 9/3/2014.  ______________________________________     Electronically signed by: AICHA HOPKINS  Date:     04/26/16  Time:    08:15      Results for orders placed in visit on 05/22/13   X-Ray Cervical Spine AP And Lateral    Narrative DATE OF EXAM: Jun 6 2013      [facility]   0133  -  C-SPINE 2 OR 3 VIEWS:   \  [id number]     CLINICAL HISTORY:   \723.1  CERVICALGIA     PROCEDURE COMMENT:   \     ICD 9 CODE(S):   (\)     CPT 4 CODE(S)/MODIFIER(S):   (\)     Findings:     Changes of degenerative disk disease are present from the C3-4 level   inferiorly to the C5-6 level with these most pronounced at the C5-6   level.  AP alignment is maintained with no acute fractures or   prevertebral soft tissue swelling identified.  Lateral masses of C1 are   well aligned.        Impression:     Multilevel degenerative disk disease appearing most pronounced at the   C5-6 level.    "     Electronically signed by: SNEHA ELISE MD  Date:     06/06/13  Time:    14:32            : KAILASH  Transcribe Date/Time: Jun 6 2013  2:32P  Dictated by : SNEHA ELISE MD  Read On:   \  Images were reviewed, findings were verified and document was   electronically  SIGNED BY: SNEHA ELISE MD On: Jun 6 2013  2:32P          Review of Systems:  CONSTITUTIONAL: patient denies any fever, chills, or weight loss  SKIN: patient denies any rash or itching  RESPIRATORY: patient denies having any shortness of breath  GASTROINTESTINAL: patient denies having any diarrhea, constipation, or bowel incontinence  GENITOURINARY: patient denies having any abnormal bladder function    MUSCULOSKELETAL:  - patient complains of the above noted pain/s (see chief pain complaint)    NEUROLOGICAL:   - pain as above  - strength in Lower extremities is decreased, on the LEFT  - sensation in Lower extremities is intact, BILATERALLY  - patient denies any loss of bowel or bladder control      PSYCHIATRIC: patient denies any change in mood    Other:  All other systems reviewed and are negative      Physical Exam:  /61 (BP Location: Left arm, Patient Position: Sitting)   Pulse 68   Resp 16   Ht 6' 3" (1.905 m)   Wt 95.3 kg (210 lb)   BMI 26.25 kg/m²  (reviewed on 1/7/2018)  General: Alert and oriented, in no apparent distress.  Gait: normal gait.  Skin: No rashes, No discoloration, No obvious lesions  HEENT: Normocephalic, atraumatic. Pupils equal and round.  Cardiovascular: Regular rate and rhythm , no significant peripheral edema present  Respiratory: Without audible wheezing, without use of accessory muscles of respiration.    Musculoskeletal:    Lumbar Spine    - Pain on flexion of lumbar spine Present  - Straight Leg Raise:  Present on Left     - Pain on extension of lumbar spine Absent  - TTP over the lumbar facet joints Absent  - Lumbar facet loading Absent    -Pain on palpation over the SI joint  Absent  - GELY: " Absent      Neuro:    Strength:    LE R/L: HF: 5/4, HE: 5/4, KF: 5/5; KE: 5/5; FE: 5/5; FF: 5/5    Extremity Reflexes: Brisk and symmetric throughout.      Extremity Sensory: Sensation to pinprick and temperature symmetric. Proprioception intact.      Psych:  Mood and affect is appropriate      Assessment:    Srinath Mcknight is a 77 y.o. year old male who is presenting with     Encounter Diagnoses   Name Primary?    Lumbar spondylosis Yes    Lumbar radiculopathy        Plan:    1. Interventional: Schedule patient for Left L4-5, L5-S1 TFESI with local.     2. Pharmacologic: Continue Percocet 10/325 mg PO TID PRN (90 tabs) as prescribed by Dr Alcocer on 12/12. Start Gabapentin 300 mg PO Qhs with up titration.     3. Rehabilitative: PT post injection.    4. Diagnostic: None for now.    5. Follow up: Return for After Injection.      30 minutes were spent in this encounter with more than 50% of the time used for counseling and review of the plan.  Imaging / studies reviewed, detailed above.  I discussed in detail the risks, benefits, and alternatives to any and all potential treatment options.  All questions and concerns were fully addressed today in clinic. Medical decision making moderate.    Thank you for the opportunity to assist in the care of this patient.    Best wishes,    Signed:    Erik Roldan MD          Disclaimer:  This note may have been prepared using voice recognition software, it may have not been extensively proofed, as such there could be errors within the text such as sound alike errors.

## 2018-01-08 RX ORDER — CLONAZEPAM 0.25 MG/1
TABLET, ORALLY DISINTEGRATING ORAL
Qty: 90 TABLET | Refills: 0 | Status: SHIPPED | OUTPATIENT
Start: 2018-01-08 | End: 2018-03-12 | Stop reason: SDUPTHER

## 2018-01-12 ENCOUNTER — OFFICE VISIT (OUTPATIENT)
Dept: INTERNAL MEDICINE | Facility: CLINIC | Age: 78
End: 2018-01-12
Payer: MEDICARE

## 2018-01-12 VITALS
HEART RATE: 72 BPM | SYSTOLIC BLOOD PRESSURE: 122 MMHG | BODY MASS INDEX: 25.99 KG/M2 | TEMPERATURE: 98 F | HEIGHT: 75 IN | DIASTOLIC BLOOD PRESSURE: 70 MMHG | WEIGHT: 209 LBS

## 2018-01-12 DIAGNOSIS — I70.0 AORTIC CALCIFICATION: Chronic | ICD-10-CM

## 2018-01-12 DIAGNOSIS — M1A.0710 IDIOPATHIC CHRONIC GOUT OF RIGHT FOOT WITHOUT TOPHUS: Primary | ICD-10-CM

## 2018-01-12 DIAGNOSIS — I15.2 HYPERTENSION ASSOCIATED WITH DIABETES: Chronic | ICD-10-CM

## 2018-01-12 DIAGNOSIS — E11.59 HYPERTENSION ASSOCIATED WITH DIABETES: Chronic | ICD-10-CM

## 2018-01-12 DIAGNOSIS — E11.42 TYPE 2 DIABETES MELLITUS WITH DIABETIC POLYNEUROPATHY, WITHOUT LONG-TERM CURRENT USE OF INSULIN: ICD-10-CM

## 2018-01-12 PROCEDURE — 99499 UNLISTED E&M SERVICE: CPT | Mod: S$GLB,,, | Performed by: INTERNAL MEDICINE

## 2018-01-12 PROCEDURE — 99999 PR PBB SHADOW E&M-EST. PATIENT-LVL III: CPT | Mod: PBBFAC,,, | Performed by: INTERNAL MEDICINE

## 2018-01-12 PROCEDURE — 99214 OFFICE O/P EST MOD 30 MIN: CPT | Mod: S$GLB,,, | Performed by: INTERNAL MEDICINE

## 2018-01-12 RX ORDER — ALLOPURINOL 100 MG/1
100 TABLET ORAL DAILY
Qty: 90 TABLET | Refills: 4 | Status: SHIPPED | OUTPATIENT
Start: 2018-01-12 | End: 2018-01-12 | Stop reason: SDUPTHER

## 2018-01-12 RX ORDER — ALLOPURINOL 100 MG/1
100 TABLET ORAL DAILY
Qty: 90 TABLET | Refills: 4 | Status: SHIPPED | OUTPATIENT
Start: 2018-01-12 | End: 2019-02-25 | Stop reason: SDUPTHER

## 2018-01-12 RX ORDER — ASPIRIN 81 MG/1
81 TABLET ORAL DAILY
Refills: 0 | Status: ON HOLD | COMMUNITY
Start: 2018-01-12 | End: 2018-09-11

## 2018-01-12 NOTE — PROGRESS NOTES
"Subjective:       Patient ID: Srinath Mcknight is a 77 y.o. male.    Chief Complaint: Gout    HPI  patient is a 77-year-old male presenting today following up on his gout.  He is interested in pursuing suppressive treatment at this time.  He states he's just over time for having more problems with the gout that he would rather keep it from happening and had to continue to try to change it down every time it occurs.  We talked about the options of allopurinol and knee [place].  He is interested in pursuing one of those options.  His GFR has been good so we can use allopurinol.    His diabetes has been stable.  He is not having problems with it at this time.  He'll be due for some follow-up lab work in a few months.  His blood pressure remains under good control without any issues.    Review of Systems   Constitutional: Negative for fever and unexpected weight change.   Respiratory: Negative for cough, shortness of breath and wheezing.    Cardiovascular: Negative for chest pain and palpitations.   Gastrointestinal: Negative for constipation, diarrhea, nausea and vomiting.   Genitourinary: Negative for dysuria and hematuria.   Musculoskeletal: Positive for arthralgias.       Objective:   /70   Pulse 72   Temp 97.6 °F (36.4 °C) (Tympanic)   Ht 6' 3" (1.905 m)   Wt 94.8 kg (209 lb)   BMI 26.12 kg/m²      Physical Exam   Constitutional: He appears well-developed and well-nourished.   HENT:   Head: Normocephalic and atraumatic.   Eyes: Pupils are equal, round, and reactive to light.   Neck: Neck supple. No thyromegaly present.   Cardiovascular: Normal rate, regular rhythm and normal heart sounds.  Exam reveals no gallop and no friction rub.    No murmur heard.  Pulmonary/Chest: Breath sounds normal. He has no wheezes. He has no rales.   Abdominal: Soft. Bowel sounds are normal. He exhibits no distension. There is no tenderness.   Vitals reviewed.      No visits with results within 2 Week(s) from this visit. "   Latest known visit with results is:   Lab Visit on 12/20/2017   Component Date Value    Creatinine 12/20/2017 1.2     eGFR if African American 12/20/2017 >60     eGFR if non  Amer* 12/20/2017 59*       Assessment:       1. Idiopathic chronic gout of right foot without tophus    2. Type 2 diabetes mellitus with diabetic polyneuropathy, without long-term current use of insulin    3. Hypertension associated with diabetes    4. Aortic calcification        Plan:   Type 2 diabetes mellitus with diabetic polyneuropathy, without long-term current use of insulin  COntinue meds, numbers have been good.  fOllow up a1c in April    Hypertension associated with diabetes  Blood pressure is under good control.  We will continue the current regimen.  Will work on regular aerobic exercise and a low salt diet.        Aortic calcification  No intervention required.  Continue bp control and cholesterol meds and aspirin daily    [place] was seen today for gout.    Diagnoses and all orders for this visit:    Idiopathic chronic gout of right foot without tophus    Type 2 diabetes mellitus with diabetic polyneuropathy, without long-term current use of insulin  -     Hemoglobin A1c; Future  -     Lipid panel; Future  -     Microalbumin/creatinine urine ratio; Future    Hypertension associated with diabetes    Aortic calcification    Other orders  -     Discontinue: allopurinol (ZYLOPRIM) 100 MG tablet; Take 1 tablet (100 mg total) by mouth once daily.  -     allopurinol (ZYLOPRIM) 100 MG tablet; Take 1 tablet (100 mg total) by mouth once daily.  -     aspirin (ECOTRIN) 81 MG EC tablet; Take 1 tablet (81 mg total) by mouth once daily.        Return in about 3 months (around 4/12/2018).

## 2018-01-19 ENCOUNTER — SURGERY (OUTPATIENT)
Age: 78
End: 2018-01-19

## 2018-01-19 ENCOUNTER — HOSPITAL ENCOUNTER (OUTPATIENT)
Dept: RADIOLOGY | Facility: HOSPITAL | Age: 78
Discharge: HOME | End: 2018-01-19
Attending: ANESTHESIOLOGY | Admitting: ANESTHESIOLOGY
Payer: MEDICARE

## 2018-01-19 DIAGNOSIS — M47.816 LUMBAR SPONDYLOSIS: ICD-10-CM

## 2018-01-19 DIAGNOSIS — M54.16 LUMBAR RADICULOPATHY: ICD-10-CM

## 2018-01-19 NOTE — H&P (VIEW-ONLY)
Chief Pain Complaint:  Left Lumbar Back pain with radiation down left leg      History of Present Illness:   Srinath Mcknight is a 77 y.o. male  who is presenting with a chief complaint of Left Lumbar Back pain with radiation down left leg. The patient began experiencing this problem insidiously, and the pain has been gradually worsening over the past 3 month(s). The pain is described as throbbing, shooting, burning and electrical and is located in the left lumbar spine. Pain is intermittent and lasts hours. The pain radiates to  left leg anterior thigh and shin. The patient rates his pain a 8 out of ten and interferes with activities of daily living a 7 out of ten. Pain is exacerbated by flexion of the lumbar spine, and is improved by rest. Patient reports no prior trauma,  prior spinal surgery Laminectomy at L4-L5, prior hip surgery Left total hip replacement.     - pertinent negatives: No fever, No chills, No weight loss, No bladder dysfunction, No bowel dysfunction, No saddle anesthesia  - pertinent positives: left leg weakness    - medications, other therapies tried (physical therapy, injections):     >> NSAIDs, Tylenol, Percocet and oxycodone    >> Has previously undergone Physical Therapy    >> Has NOT previously undergone spinal injection/s      Imaging / Labs / Studies (reviewed on 1/7/2018):    Results for orders placed during the hospital encounter of 05/02/16   MRI Lumbar Spine Without Contrast    Narrative MRI OF THE LUMBAR SPINE WITHOUT CONTRAST.     Technique:  Sagittal T1, sagittal T2, sagittal STIR, axial T1 and axial T2 weighted images of the lumbar spine obtained without contrast.     Comparison: MRI Lumbar spine without contrast from 11/12/14     Findings:  The there is minimal (grade I) the retrolisthesis of L5 on S1.  Lumbar spinal alignment is otherwise within normal limits.  There is no evidence of acute fracture.  Vertebral body heights are maintained.  No abnormal marrow signals identified  to suggest an infiltrative process.  The conus is normal in appearance and terminates at the L1 level.    There is intervertebral disc space narrowing and desiccation of discs within the lower lumbar spine.  There findings of multilevel lumbar spondylosis as detailed below.    L1-L2: No significant central canal stenosis or neuroforaminal narrowing.    L2-L3: There is a mild broad-based disc bulge without significant central canal stenosis or neuroforaminal narrowing.     L3-L4: There is a broad-based disc bulge and bilateral facet arthropathy with associated synovial cyst arising from the left facet joint measuring approximately 5 x 9 mm, similar to the prior examination.  This results in moderate central canal stenosis, slightly progressed from the prior examination.  There is mild bilateral neuroforaminal narrowing.     L4-L5: There is a broad-based disc bulge and prominent bilateral facet arthropathy resulting in severe central canal stenosis and mild bilateral neuroforaminal narrowing.     L5-S1: There is a broad-based disc bulge and a bilateral facet arthropathy resulting in mild bilateral neuroforaminal narrowing.  No significant central canal stenosis.     The visualized intra-abdominal contents demonstrate no significant abnormalities.  The bilateral SI joints are unremarkable.  The paraspinal musculature is within normal limits.    Impression      1.  Lumbar spondylosis resulting in moderate central canal stenosis at L3-L4 and severe central canal stenosis at L4-L5.  Mild neuroforaminal narrowing noted at L3-L4 through L5-S1.  Findings are slightly progressed from the prior examination from 11/12/14.    2.  Prominent facet arthropathy noted within the lower lumbar spine with a grossly stable appearing synovial cyst arising from the left facet joint at L3-L4.    ______________________________________     Electronically signed by resident: Eduar Perdomo  Date:     05/02/16  Time:    10:40        ______________________________________     Electronically signed by: Abel Nieves MD  Date:     05/02/16  Time:    10:49      Results for orders placed during the hospital encounter of 12/20/17   MRI Lumbar Spine W WO Contrast    Narrative EXAM: [id number] LUMBAR SPINE W WO CONTRAST    CLINICAL HISTORY: Left sided sciatic pain.  Aching and shooting pain 9-10 severity.    TECHNIQUE: MR Lumbar spine with contrast. Sagittal T1, T2, STIR. Axial T1, T2. Post contrast Sagittal and Axial T1.    COMPARISON: Previous MRI of the lumbar spine from 05/02/2016.    FINDINGS:    Since the previous study there has been a laminectomy at L4-L5. There is enhancement of granulation tissue at the operative site.  No abnormal enhancement surrounds the exiting nerve roots.  The thecal sac measures 9 mm AP at this level.  There is slight mass effect upon the lateral recess without definite compression of the descending L5 nerve roots. There is a synovial cyst with peripheral enhancement projecting centrally and inferiorly from the left facet joint at L3-L4 that demonstrates peripheral enhancement.  It causes mild spinal stenosis at the level of L4 and is slightly larger than on the previous study from May 2016. The conus medullaris terminates at the level ofL1-L2. No abnormal signal within the conus. Intervertebral disc levels are as follows:    T12-L1 disc: No significant disc pathology. No spinal canal or neural foraminal stenosis.    L1-L2 disc : No significant disc pathology. No spinal canal or neural foraminal stenosis.    L2-L3 disc: Normal disc height with mild degenerative facet changes and thickening of ligamentum flavum.  No significant canal or foraminal stenosis.    L3-L4 disc: Partial distal desiccation with a broad-based posterior disc bulge.  There is a far left lateral annular fissure of the disc.  Moderate to severe degenerative facet change with bilateral facet joint effusions.  The thecal sac measures 10 mm AP but is  about 50% of normal cross-sectional area.  Disc material bulges into the floor of the left exit foramen and causes mild to moderate left foraminal stenosis.  There is minimal right foraminal stenosis.  Additionally, there is a synovial cyst projects centrally and inferiorly from the left facet joint.  The synovial cyst demonstrates peripheral enhancement and measures 11 mm craniocaudal by 11 mm transverse by 5 mm AP.  It is best demonstrated on axial T2 series 6 image 23.  It causes mild thecal sac stenosis without definite neural compression.  There is a smaller synovial cyst projecting toward the left as well, remote from any neural structures.    L4-L5 disc: Level of interval laminectomy.  There is enhancing granulation tissue at the operative site without enhancement surrounding the exiting nerve roots.  The thecal sac measures 9 mm AP.  There is mild mass effect upon the lateral recesses without definite compression no descending L5 nerve roots.  Minimal bilateral foraminal stenosis.    L5-S1 disc: Partial does desiccation and disc space height loss.  Height loss is most severe toward the left where there are marginal osteophytes.  Osteophyte material encroaches into the floor of the left exit foramen causing moderate to severe left foraminal stenosis.  The right neural foramen is minimally narrowed.  Moderate degenerative facet change.  The thecal sac measures 12 mm AP.    Impression      1.  There has been an interval laminectomy at L4-L5.  There is enhancing granulation tissue at the operative site.  There is diminished spinal stenosis at this level compared to the previous exam.    2.  Enhancement surrounds a synovial cyst that projects centrally and inferiorly from the left L3-L4 facet joint and causes mild spinal stenosis.  The cyst has increased in size compared to the previous examination.    3.  There is mild to moderate spinal stenosis at L3-L4 where the thecal sac is about 50% of normal  cross-sectional area, predominantly related to hypertrophy of the posterior elements.    4.  There is a far left lateral annular fissure of L3-L4 disc.    5.  Moderate to severe left-sided foraminal stenosis at L5-S1.        Electronically signed by: CHENCHO MARTIN M.D.  Date:     12/21/17  Time:    09:34      Results for orders placed during the hospital encounter of 09/03/14   X-Ray Lumbar Spine Complete 5 View    Narrative Findings:     As compared with 11/18/09, no significant interim change is identified.  Diffuse chronic changes again demonstrated as previously described.    Impression      No significant interim change appreciated as compared with 11/18/09.      Electronically signed by: SNEHA ELISE MD  Date:     09/03/14  Time:    17:10        Results for orders placed during the hospital encounter of 04/26/16   X-Ray Lumbar Spine Ap Lateral w/Flex Ext    Narrative 4 views: Alignment is normal.  There is mild-moderate DJD.  No instability seen.  No fracture dislocation bone destruction seen.    Impression  Chronic change as above.  No change from the prior 9/3/2014.  ______________________________________     Electronically signed by: AICHA HOPKINS  Date:     04/26/16  Time:    08:15      Results for orders placed in visit on 05/22/13   X-Ray Cervical Spine AP And Lateral    Narrative DATE OF EXAM: Jun 6 2013      [facility]   0133  -  C-SPINE 2 OR 3 VIEWS:   \  [id number]     CLINICAL HISTORY:   \723.1  CERVICALGIA     PROCEDURE COMMENT:   \     ICD 9 CODE(S):   (\)     CPT 4 CODE(S)/MODIFIER(S):   (\)     Findings:     Changes of degenerative disk disease are present from the C3-4 level   inferiorly to the C5-6 level with these most pronounced at the C5-6   level.  AP alignment is maintained with no acute fractures or   prevertebral soft tissue swelling identified.  Lateral masses of C1 are   well aligned.        Impression:     Multilevel degenerative disk disease appearing most pronounced at the   C5-6 level.    "     Electronically signed by: SNEHA ELISE MD  Date:     06/06/13  Time:    14:32            : KAILASH  Transcribe Date/Time: Jun 6 2013  2:32P  Dictated by : SNEHA ELISE MD  Read On:   \  Images were reviewed, findings were verified and document was   electronically  SIGNED BY: SNEHA ELISE MD On: Jun 6 2013  2:32P          Review of Systems:  CONSTITUTIONAL: patient denies any fever, chills, or weight loss  SKIN: patient denies any rash or itching  RESPIRATORY: patient denies having any shortness of breath  GASTROINTESTINAL: patient denies having any diarrhea, constipation, or bowel incontinence  GENITOURINARY: patient denies having any abnormal bladder function    MUSCULOSKELETAL:  - patient complains of the above noted pain/s (see chief pain complaint)    NEUROLOGICAL:   - pain as above  - strength in Lower extremities is decreased, on the LEFT  - sensation in Lower extremities is intact, BILATERALLY  - patient denies any loss of bowel or bladder control      PSYCHIATRIC: patient denies any change in mood    Other:  All other systems reviewed and are negative      Physical Exam:  /61 (BP Location: Left arm, Patient Position: Sitting)   Pulse 68   Resp 16   Ht 6' 3" (1.905 m)   Wt 95.3 kg (210 lb)   BMI 26.25 kg/m²  (reviewed on 1/7/2018)  General: Alert and oriented, in no apparent distress.  Gait: normal gait.  Skin: No rashes, No discoloration, No obvious lesions  HEENT: Normocephalic, atraumatic. Pupils equal and round.  Cardiovascular: Regular rate and rhythm , no significant peripheral edema present  Respiratory: Without audible wheezing, without use of accessory muscles of respiration.    Musculoskeletal:    Lumbar Spine    - Pain on flexion of lumbar spine Present  - Straight Leg Raise:  Present on Left     - Pain on extension of lumbar spine Absent  - TTP over the lumbar facet joints Absent  - Lumbar facet loading Absent    -Pain on palpation over the SI joint  Absent  - GELY: " Absent      Neuro:    Strength:    LE R/L: HF: 5/4, HE: 5/4, KF: 5/5; KE: 5/5; FE: 5/5; FF: 5/5    Extremity Reflexes: Brisk and symmetric throughout.      Extremity Sensory: Sensation to pinprick and temperature symmetric. Proprioception intact.      Psych:  Mood and affect is appropriate      Assessment:    Srinath Mcknight is a 77 y.o. year old male who is presenting with     Encounter Diagnoses   Name Primary?    Lumbar spondylosis Yes    Lumbar radiculopathy        Plan:    1. Interventional: Schedule patient for Left L4-5, L5-S1 TFESI with local.     2. Pharmacologic: Continue Percocet 10/325 mg PO TID PRN (90 tabs) as prescribed by Dr Alcocer on 12/12. Start Gabapentin 300 mg PO Qhs with up titration.     3. Rehabilitative: PT post injection.    4. Diagnostic: None for now.    5. Follow up: Return for After Injection.      30 minutes were spent in this encounter with more than 50% of the time used for counseling and review of the plan.  Imaging / studies reviewed, detailed above.  I discussed in detail the risks, benefits, and alternatives to any and all potential treatment options.  All questions and concerns were fully addressed today in clinic. Medical decision making moderate.    Thank you for the opportunity to assist in the care of this patient.    Best wishes,    Signed:    Erik Roldan MD          Disclaimer:  This note may have been prepared using voice recognition software, it may have not been extensively proofed, as such there could be errors within the text such as sound alike errors.

## 2018-01-23 ENCOUNTER — OFFICE VISIT (OUTPATIENT)
Dept: OPHTHALMOLOGY | Facility: CLINIC | Age: 78
End: 2018-01-23
Payer: MEDICARE

## 2018-01-23 DIAGNOSIS — H43.813 PVD (POSTERIOR VITREOUS DETACHMENT), BILATERAL: ICD-10-CM

## 2018-01-23 DIAGNOSIS — M54.16 LUMBAR RADICULOPATHY: Primary | ICD-10-CM

## 2018-01-23 DIAGNOSIS — E11.9 DIABETES MELLITUS TYPE 2 WITHOUT RETINOPATHY: Primary | ICD-10-CM

## 2018-01-23 DIAGNOSIS — H52.4 BILATERAL PRESBYOPIA: ICD-10-CM

## 2018-01-23 PROCEDURE — 92014 COMPRE OPH EXAM EST PT 1/>: CPT | Mod: S$GLB,,, | Performed by: OPTOMETRIST

## 2018-01-23 PROCEDURE — 92015 DETERMINE REFRACTIVE STATE: CPT | Mod: S$GLB,,, | Performed by: OPTOMETRIST

## 2018-01-23 PROCEDURE — 99999 PR PBB SHADOW E&M-EST. PATIENT-LVL I: CPT | Mod: PBBFAC,,, | Performed by: OPTOMETRIST

## 2018-01-23 PROCEDURE — 99499 UNLISTED E&M SERVICE: CPT | Mod: S$GLB,,, | Performed by: OPTOMETRIST

## 2018-01-23 NOTE — PROGRESS NOTES
HPI     NIDDM exam. No visual complaints. Last eye exam 01/10/2017 TRF. Update   glasses RX.    Last edited by Olimpia Ponce on 1/23/2018 10:36 AM. (History)            Assessment /Plan     For exam results, see Encounter Report.    Diabetes mellitus type 2 without retinopathy    PVD (posterior vitreous detachment), bilateral    Bilateral presbyopia      No Background Diabetic Retinopathy    Stable PVD OU    Dispense Final Rx for glasses.  RTC 1 year

## 2018-01-24 ENCOUNTER — SURGERY (OUTPATIENT)
Age: 78
End: 2018-01-24

## 2018-01-24 ENCOUNTER — HOSPITAL ENCOUNTER (OUTPATIENT)
Dept: RADIOLOGY | Facility: HOSPITAL | Age: 78
Discharge: HOME | End: 2018-01-24
Attending: ANESTHESIOLOGY | Admitting: ANESTHESIOLOGY
Payer: MEDICARE

## 2018-01-24 ENCOUNTER — HOSPITAL ENCOUNTER (OUTPATIENT)
Facility: HOSPITAL | Age: 78
Discharge: HOME | End: 2018-01-24
Attending: ANESTHESIOLOGY | Admitting: ANESTHESIOLOGY
Payer: MEDICARE

## 2018-01-24 VITALS
WEIGHT: 210 LBS | BODY MASS INDEX: 26.11 KG/M2 | OXYGEN SATURATION: 95 % | TEMPERATURE: 98 F | DIASTOLIC BLOOD PRESSURE: 106 MMHG | HEART RATE: 64 BPM | HEIGHT: 75 IN | SYSTOLIC BLOOD PRESSURE: 141 MMHG | RESPIRATION RATE: 16 BRPM

## 2018-01-24 DIAGNOSIS — M47.816 LUMBAR SPONDYLOSIS: ICD-10-CM

## 2018-01-24 PROCEDURE — 64484 NJX AA&/STRD TFRM EPI L/S EA: CPT

## 2018-01-24 PROCEDURE — 25000003 PHARM REV CODE 250

## 2018-01-24 PROCEDURE — 63600175 PHARM REV CODE 636 W HCPCS: Performed by: ANESTHESIOLOGY

## 2018-01-24 PROCEDURE — 64483 NJX AA&/STRD TFRM EPI L/S 1: CPT

## 2018-01-24 PROCEDURE — 64484 NJX AA&/STRD TFRM EPI L/S EA: CPT | Mod: LT,,, | Performed by: ANESTHESIOLOGY

## 2018-01-24 PROCEDURE — 64483 NJX AA&/STRD TFRM EPI L/S 1: CPT | Mod: LT,,, | Performed by: ANESTHESIOLOGY

## 2018-01-24 PROCEDURE — 63600175 PHARM REV CODE 636 W HCPCS

## 2018-01-24 PROCEDURE — 25000003 PHARM REV CODE 250: Performed by: ANESTHESIOLOGY

## 2018-01-24 RX ORDER — LIDOCAINE HYDROCHLORIDE 20 MG/ML
INJECTION, SOLUTION EPIDURAL; INFILTRATION; INTRACAUDAL; PERINEURAL
Status: DISCONTINUED | OUTPATIENT
Start: 2018-01-24 | End: 2018-01-24 | Stop reason: HOSPADM

## 2018-01-24 RX ORDER — DEXAMETHASONE SODIUM PHOSPHATE 10 MG/ML
INJECTION INTRAMUSCULAR; INTRAVENOUS
Status: DISCONTINUED | OUTPATIENT
Start: 2018-01-24 | End: 2018-01-24 | Stop reason: HOSPADM

## 2018-01-24 RX ADMIN — DEXAMETHASONE SODIUM PHOSPHATE 20 MG: 10 INJECTION, SOLUTION INTRAMUSCULAR; INTRAVENOUS at 08:01

## 2018-01-24 RX ADMIN — LIDOCAINE HYDROCHLORIDE 4 ML: 20 INJECTION, SOLUTION EPIDURAL; INFILTRATION; INTRACAUDAL; PERINEURAL at 08:01

## 2018-01-24 NOTE — PLAN OF CARE
Problem: Patient Care Overview  Goal: Plan of Care Review  Outcome: Outcome(s) achieved Date Met: 01/24/18  Patient discharged home in stable condition via wheelchair with ride. Verbalized understanding of discharge instructions. Patient voiced no complaints at this time. Patient stood at side of bed, walked steps with no new motor or sensory deficits. Neurologically intact.

## 2018-01-24 NOTE — H&P (VIEW-ONLY)
Chief Pain Complaint:  Left Lumbar Back pain with radiation down left leg      History of Present Illness:   Srinath Mcknight is a 77 y.o. male  who is presenting with a chief complaint of Left Lumbar Back pain with radiation down left leg. The patient began experiencing this problem insidiously, and the pain has been gradually worsening over the past 3 month(s). The pain is described as throbbing, shooting, burning and electrical and is located in the left lumbar spine. Pain is intermittent and lasts hours. The pain radiates to  left leg anterior thigh and shin. The patient rates his pain a 8 out of ten and interferes with activities of daily living a 7 out of ten. Pain is exacerbated by flexion of the lumbar spine, and is improved by rest. Patient reports no prior trauma,  prior spinal surgery Laminectomy at L4-L5, prior hip surgery Left total hip replacement.     - pertinent negatives: No fever, No chills, No weight loss, No bladder dysfunction, No bowel dysfunction, No saddle anesthesia  - pertinent positives: left leg weakness    - medications, other therapies tried (physical therapy, injections):     >> NSAIDs, Tylenol, Percocet and oxycodone    >> Has previously undergone Physical Therapy    >> Has NOT previously undergone spinal injection/s      Imaging / Labs / Studies (reviewed on 1/7/2018):    Results for orders placed during the hospital encounter of 05/02/16   MRI Lumbar Spine Without Contrast    Narrative MRI OF THE LUMBAR SPINE WITHOUT CONTRAST.     Technique:  Sagittal T1, sagittal T2, sagittal STIR, axial T1 and axial T2 weighted images of the lumbar spine obtained without contrast.     Comparison: MRI Lumbar spine without contrast from 11/12/14     Findings:  The there is minimal (grade I) the retrolisthesis of L5 on S1.  Lumbar spinal alignment is otherwise within normal limits.  There is no evidence of acute fracture.  Vertebral body heights are maintained.  No abnormal marrow signals identified  to suggest an infiltrative process.  The conus is normal in appearance and terminates at the L1 level.    There is intervertebral disc space narrowing and desiccation of discs within the lower lumbar spine.  There findings of multilevel lumbar spondylosis as detailed below.    L1-L2: No significant central canal stenosis or neuroforaminal narrowing.    L2-L3: There is a mild broad-based disc bulge without significant central canal stenosis or neuroforaminal narrowing.     L3-L4: There is a broad-based disc bulge and bilateral facet arthropathy with associated synovial cyst arising from the left facet joint measuring approximately 5 x 9 mm, similar to the prior examination.  This results in moderate central canal stenosis, slightly progressed from the prior examination.  There is mild bilateral neuroforaminal narrowing.     L4-L5: There is a broad-based disc bulge and prominent bilateral facet arthropathy resulting in severe central canal stenosis and mild bilateral neuroforaminal narrowing.     L5-S1: There is a broad-based disc bulge and a bilateral facet arthropathy resulting in mild bilateral neuroforaminal narrowing.  No significant central canal stenosis.     The visualized intra-abdominal contents demonstrate no significant abnormalities.  The bilateral SI joints are unremarkable.  The paraspinal musculature is within normal limits.    Impression      1.  Lumbar spondylosis resulting in moderate central canal stenosis at L3-L4 and severe central canal stenosis at L4-L5.  Mild neuroforaminal narrowing noted at L3-L4 through L5-S1.  Findings are slightly progressed from the prior examination from 11/12/14.    2.  Prominent facet arthropathy noted within the lower lumbar spine with a grossly stable appearing synovial cyst arising from the left facet joint at L3-L4.    ______________________________________     Electronically signed by resident: Eduar Perdomo  Date:     05/02/16  Time:    10:40        ______________________________________     Electronically signed by: Abel Nieves MD  Date:     05/02/16  Time:    10:49      Results for orders placed during the hospital encounter of 12/20/17   MRI Lumbar Spine W WO Contrast    Narrative EXAM: [id number] LUMBAR SPINE W WO CONTRAST    CLINICAL HISTORY: Left sided sciatic pain.  Aching and shooting pain 9-10 severity.    TECHNIQUE: MR Lumbar spine with contrast. Sagittal T1, T2, STIR. Axial T1, T2. Post contrast Sagittal and Axial T1.    COMPARISON: Previous MRI of the lumbar spine from 05/02/2016.    FINDINGS:    Since the previous study there has been a laminectomy at L4-L5. There is enhancement of granulation tissue at the operative site.  No abnormal enhancement surrounds the exiting nerve roots.  The thecal sac measures 9 mm AP at this level.  There is slight mass effect upon the lateral recess without definite compression of the descending L5 nerve roots. There is a synovial cyst with peripheral enhancement projecting centrally and inferiorly from the left facet joint at L3-L4 that demonstrates peripheral enhancement.  It causes mild spinal stenosis at the level of L4 and is slightly larger than on the previous study from May 2016. The conus medullaris terminates at the level ofL1-L2. No abnormal signal within the conus. Intervertebral disc levels are as follows:    T12-L1 disc: No significant disc pathology. No spinal canal or neural foraminal stenosis.    L1-L2 disc : No significant disc pathology. No spinal canal or neural foraminal stenosis.    L2-L3 disc: Normal disc height with mild degenerative facet changes and thickening of ligamentum flavum.  No significant canal or foraminal stenosis.    L3-L4 disc: Partial distal desiccation with a broad-based posterior disc bulge.  There is a far left lateral annular fissure of the disc.  Moderate to severe degenerative facet change with bilateral facet joint effusions.  The thecal sac measures 10 mm AP but is  about 50% of normal cross-sectional area.  Disc material bulges into the floor of the left exit foramen and causes mild to moderate left foraminal stenosis.  There is minimal right foraminal stenosis.  Additionally, there is a synovial cyst projects centrally and inferiorly from the left facet joint.  The synovial cyst demonstrates peripheral enhancement and measures 11 mm craniocaudal by 11 mm transverse by 5 mm AP.  It is best demonstrated on axial T2 series 6 image 23.  It causes mild thecal sac stenosis without definite neural compression.  There is a smaller synovial cyst projecting toward the left as well, remote from any neural structures.    L4-L5 disc: Level of interval laminectomy.  There is enhancing granulation tissue at the operative site without enhancement surrounding the exiting nerve roots.  The thecal sac measures 9 mm AP.  There is mild mass effect upon the lateral recesses without definite compression no descending L5 nerve roots.  Minimal bilateral foraminal stenosis.    L5-S1 disc: Partial does desiccation and disc space height loss.  Height loss is most severe toward the left where there are marginal osteophytes.  Osteophyte material encroaches into the floor of the left exit foramen causing moderate to severe left foraminal stenosis.  The right neural foramen is minimally narrowed.  Moderate degenerative facet change.  The thecal sac measures 12 mm AP.    Impression      1.  There has been an interval laminectomy at L4-L5.  There is enhancing granulation tissue at the operative site.  There is diminished spinal stenosis at this level compared to the previous exam.    2.  Enhancement surrounds a synovial cyst that projects centrally and inferiorly from the left L3-L4 facet joint and causes mild spinal stenosis.  The cyst has increased in size compared to the previous examination.    3.  There is mild to moderate spinal stenosis at L3-L4 where the thecal sac is about 50% of normal  cross-sectional area, predominantly related to hypertrophy of the posterior elements.    4.  There is a far left lateral annular fissure of L3-L4 disc.    5.  Moderate to severe left-sided foraminal stenosis at L5-S1.        Electronically signed by: CHENCHO MARTIN M.D.  Date:     12/21/17  Time:    09:34      Results for orders placed during the hospital encounter of 09/03/14   X-Ray Lumbar Spine Complete 5 View    Narrative Findings:     As compared with 11/18/09, no significant interim change is identified.  Diffuse chronic changes again demonstrated as previously described.    Impression      No significant interim change appreciated as compared with 11/18/09.      Electronically signed by: SNEHA ELISE MD  Date:     09/03/14  Time:    17:10        Results for orders placed during the hospital encounter of 04/26/16   X-Ray Lumbar Spine Ap Lateral w/Flex Ext    Narrative 4 views: Alignment is normal.  There is mild-moderate DJD.  No instability seen.  No fracture dislocation bone destruction seen.    Impression  Chronic change as above.  No change from the prior 9/3/2014.  ______________________________________     Electronically signed by: AICHA HOPKINS  Date:     04/26/16  Time:    08:15      Results for orders placed in visit on 05/22/13   X-Ray Cervical Spine AP And Lateral    Narrative DATE OF EXAM: Jun 6 2013      [facility]   0133  -  C-SPINE 2 OR 3 VIEWS:   \  [id number]     CLINICAL HISTORY:   \723.1  CERVICALGIA     PROCEDURE COMMENT:   \     ICD 9 CODE(S):   (\)     CPT 4 CODE(S)/MODIFIER(S):   (\)     Findings:     Changes of degenerative disk disease are present from the C3-4 level   inferiorly to the C5-6 level with these most pronounced at the C5-6   level.  AP alignment is maintained with no acute fractures or   prevertebral soft tissue swelling identified.  Lateral masses of C1 are   well aligned.        Impression:     Multilevel degenerative disk disease appearing most pronounced at the   C5-6 level.    "     Electronically signed by: SNEHA ELISE MD  Date:     06/06/13  Time:    14:32            : KAILASH  Transcribe Date/Time: Jun 6 2013  2:32P  Dictated by : SNEHA ELISE MD  Read On:   \  Images were reviewed, findings were verified and document was   electronically  SIGNED BY: SNEHA ELISE MD On: Jun 6 2013  2:32P          Review of Systems:  CONSTITUTIONAL: patient denies any fever, chills, or weight loss  SKIN: patient denies any rash or itching  RESPIRATORY: patient denies having any shortness of breath  GASTROINTESTINAL: patient denies having any diarrhea, constipation, or bowel incontinence  GENITOURINARY: patient denies having any abnormal bladder function    MUSCULOSKELETAL:  - patient complains of the above noted pain/s (see chief pain complaint)    NEUROLOGICAL:   - pain as above  - strength in Lower extremities is decreased, on the LEFT  - sensation in Lower extremities is intact, BILATERALLY  - patient denies any loss of bowel or bladder control      PSYCHIATRIC: patient denies any change in mood    Other:  All other systems reviewed and are negative      Physical Exam:  /61 (BP Location: Left arm, Patient Position: Sitting)   Pulse 68   Resp 16   Ht 6' 3" (1.905 m)   Wt 95.3 kg (210 lb)   BMI 26.25 kg/m²  (reviewed on 1/7/2018)  General: Alert and oriented, in no apparent distress.  Gait: normal gait.  Skin: No rashes, No discoloration, No obvious lesions  HEENT: Normocephalic, atraumatic. Pupils equal and round.  Cardiovascular: Regular rate and rhythm , no significant peripheral edema present  Respiratory: Without audible wheezing, without use of accessory muscles of respiration.    Musculoskeletal:    Lumbar Spine    - Pain on flexion of lumbar spine Present  - Straight Leg Raise:  Present on Left     - Pain on extension of lumbar spine Absent  - TTP over the lumbar facet joints Absent  - Lumbar facet loading Absent    -Pain on palpation over the SI joint  Absent  - GELY: " Absent      Neuro:    Strength:    LE R/L: HF: 5/4, HE: 5/4, KF: 5/5; KE: 5/5; FE: 5/5; FF: 5/5    Extremity Reflexes: Brisk and symmetric throughout.      Extremity Sensory: Sensation to pinprick and temperature symmetric. Proprioception intact.      Psych:  Mood and affect is appropriate      Assessment:    Srinath Mcknight is a 77 y.o. year old male who is presenting with     Encounter Diagnoses   Name Primary?    Lumbar spondylosis Yes    Lumbar radiculopathy        Plan:    1. Interventional: Schedule patient for Left L4-5, L5-S1 TFESI with local.     2. Pharmacologic: Continue Percocet 10/325 mg PO TID PRN (90 tabs) as prescribed by Dr Alcocer on 12/12. Start Gabapentin 300 mg PO Qhs with up titration.     3. Rehabilitative: PT post injection.    4. Diagnostic: None for now.    5. Follow up: Return for After Injection.      30 minutes were spent in this encounter with more than 50% of the time used for counseling and review of the plan.  Imaging / studies reviewed, detailed above.  I discussed in detail the risks, benefits, and alternatives to any and all potential treatment options.  All questions and concerns were fully addressed today in clinic. Medical decision making moderate.    Thank you for the opportunity to assist in the care of this patient.    Best wishes,    Signed:    Erik Roldan MD          Disclaimer:  This note may have been prepared using voice recognition software, it may have not been extensively proofed, as such there could be errors within the text such as sound alike errors.

## 2018-01-24 NOTE — OP NOTE
"Procedure: Lumbar Transforaminal Epidural Steroid Injection under Fluoroscopic Guidance (supraneural approach)     Level: L4/5 L5/S1     Side: Left     PROCEDURE DATE: 1/24/2018     Pre-operative Diagnosis: Lumbar Radiculopathy  Post-operative Diagnosis: Lumbar Radiculopathy     Provider: Erik Roldan MD  Assistant(s): None     Anesthesia: Local, No Sedation    >> 0 mg of VERSED    >> 0 mcg of FENTANYL      Indication: Low back pain with radiculopathy consistent with distribution of targeted nerve. Symptoms unresponsive to conservative treatments. Fluoroscopy was used to optimize visualization of needle placement and to maximize safety.      Procedure Description / Technique:  The patient was seen and identified in the preoperative area. Risks, benefits, complications, and alternatives were discussed with the patient. The patient agreed to proceed with the procedure and signed the consent. The site and side of the procedure was identified and marked. An IV was not placed for this procedure. The patient was taken to the procedural suite.     The patient was positioned in prone orientation on procedure table and a pillow was placed under the abdomen to reduce lumbar lordosis. A time out was performed prior to any intervention. The procedure, site, side, and allergies were stated and agreed to by all present. The lumbosacral area was widely prepped with ChloraPrep. The procedural site was draped in usual sterile fashion. Vital signs were closely monitored throughout this procedure. Conscious sedation was not used for this procedure.     The target area was visualized under fluoroscopy. The cephalocaudal angle of the fluoroscope was adjusted as to align the vertebral end plates. The fluoroscopic arm was rotated ipsilaterally to an angle of approximately 30 degrees until the "delmar dog" outline came into view and the tip of the inferior superior articular process pointed towards the midline, 6:00 position of the above " "pedicle. A 25 gauge 3.5 inch spinal needle was directed towards the "chin" of the "delmar dog" (adjacent to the pars interarticularis and inferior to the pedicle). The needle was advanced until OS was met at the inferior border of the pedicle / pars interface. The needle was adjusted so that it would pass inferior to the osseous border. The fluoroscope was then placed in the lateral position and the needle was slowly advanced until it rested in the posterior 1/3rd of the vertebral foramen. AP fluoroscopy was checked and the needle tip rested at the 6:00 position under the pedicle. No paresthesia was elicited during needle placement. With the needle tip in its final position, gentle aspiration was negative for blood and CSF. Omnipaque 240 (1 to 2 mL) was injected under live fluoroscopy. Microbore tubing was used for injection. There was no pain or paresthesia on injection. The contrast clearly delineated the targeted nerve root on AP fluoroscopy. No vascular uptake was seen. A solution containing 1 mL of 1% PF Lidocaine and 1 mL of Dexamethasone (10 mg/mL) was mixed and 2 mL was injected slowly at each level targeted. There was minimal resistance on injection. No pain or paresthesia was elicited on injection. The stylet was replaced and the needle was withdrawn intact. This procedure was performed for each of the above indicated levels.      Description of Findings: Not applicable     Prosthetic devices, grafts, tissues, or devices implanted: None     Specimen Removed: No     Estimated Blood Loss: minimal     COMPLICATIONS: None     DISPOSITION / PLANS: The patient was transferred to the recovery area in a stable condition for observation. The patient was reexamined prior to discharge. There was no evidence of acute neurologic injury following the procedure.  Patient was discharged from the recovery room after meeting discharge criteria. Home discharge instructions were given to the patient by the staff.  "

## 2018-01-24 NOTE — PROCEDURES
"Procedure: Lumbar Transforaminal Epidural Steroid Injection under Fluoroscopic Guidance (supraneural approach)    Level: L4/5 L5/S1    Side: Left    PROCEDURE DATE: 1/24/2018    Pre-operative Diagnosis: Lumbar Radiculopathy  Post-operative Diagnosis: Lumbar Radiculopathy    Provider: Erik Roldan MD  Assistant(s): None    Anesthesia: Local, No Sedation    >> 0 mg of VERSED    >> 0 mcg of FENTANYL     Indication: Low back pain with radiculopathy consistent with distribution of targeted nerve. Symptoms unresponsive to conservative treatments. Fluoroscopy was used to optimize visualization of needle placement and to maximize safety.     Procedure Description / Technique:  The patient was seen and identified in the preoperative area. Risks, benefits, complications, and alternatives were discussed with the patient. The patient agreed to proceed with the procedure and signed the consent. The site and side of the procedure was identified and marked. An IV was not placed for this procedure. The patient was taken to the procedural suite.    The patient was positioned in prone orientation on procedure table and a pillow was placed under the abdomen to reduce lumbar lordosis. A time out was performed prior to any intervention. The procedure, site, side, and allergies were stated and agreed to by all present. The lumbosacral area was widely prepped with ChloraPrep. The procedural site was draped in usual sterile fashion. Vital signs were closely monitored throughout this procedure. Conscious sedation was not used for this procedure.    The target area was visualized under fluoroscopy. The cephalocaudal angle of the fluoroscope was adjusted as to align the vertebral end plates. The fluoroscopic arm was rotated ipsilaterally to an angle of approximately 30 degrees until the "delmar dog" outline came into view and the tip of the inferior superior articular process pointed towards the midline, 6:00 position of the above pedicle. A " "25 gauge 3.5 inch spinal needle was directed towards the "chin" of the "delmar dog" (adjacent to the pars interarticularis and inferior to the pedicle). The needle was advanced until OS was met at the inferior border of the pedicle / pars interface. The needle was adjusted so that it would pass inferior to the osseous border. The fluoroscope was then placed in the lateral position and the needle was slowly advanced until it rested in the posterior 1/3rd of the vertebral foramen. AP fluoroscopy was checked and the needle tip rested at the 6:00 position under the pedicle. No paresthesia was elicited during needle placement. With the needle tip in its final position, gentle aspiration was negative for blood and CSF. Omnipaque 240 (1 to 2 mL) was injected under live fluoroscopy. Microbore tubing was used for injection. There was no pain or paresthesia on injection. The contrast clearly delineated the targeted nerve root on AP fluoroscopy. No vascular uptake was seen. A solution containing 1 mL of 1% PF Lidocaine and 1 mL of Dexamethasone (10 mg/mL) was mixed and 2 mL was injected slowly at each level targeted. There was minimal resistance on injection. No pain or paresthesia was elicited on injection. The stylet was replaced and the needle was withdrawn intact. This procedure was performed for each of the above indicated levels.     Description of Findings: Not applicable    Prosthetic devices, grafts, tissues, or devices implanted: None    Specimen Removed: No    Estimated Blood Loss: minimal    COMPLICATIONS: None    DISPOSITION / PLANS: The patient was transferred to the recovery area in a stable condition for observation. The patient was reexamined prior to discharge. There was no evidence of acute neurologic injury following the procedure.  Patient was discharged from the recovery room after meeting discharge criteria. Home discharge instructions were given to the patient by the staff.  "

## 2018-01-24 NOTE — DISCHARGE SUMMARY
[facility]  Discharge Note       Description of Procedure: Left L4/5 L5/S1 Lumbar Transforaminal Epidural Steroid Injection under Fluoroscopic Guidance    Procedure Date: 1/24/2018    Admit Date: 1/24/2018  Discharge Date: 1/24/2018     Attending Physician: Jamar Valencia   Discharge Provider: Jamar Valencia    Preoperative Diagnosis: Lumbar Spondylosis, Lumbar Radiculopathy     Postoperative Diagnosis: as above, same as preoperative diagnosis    Discharged Condition: Stable    Hospital Course: Patient was admitted for an outpatient procedure. The procedure was tolerated well with no complications.    Final Diagnoses: Same as principal problem.    Disposition: Home, self-care.    Follow up/Patient Instructions:  Follow-up in clinic in 2-3 weeks.    Medications: No medications were prescribed today. The patient was advised to resume normal medication regimen without change.  Specific information was provided regarding restarting any anticoagulant/s.    Discharge Procedure Orders (must include Diet, Follow-up, Activity):  Light activity for the remainder of the day, resume normal activity tomorrow. Resume normal diet. Follow-up in clinic in 2-3 weeks.

## 2018-01-25 ENCOUNTER — OFFICE VISIT (OUTPATIENT)
Dept: SLEEP MEDICINE | Facility: CLINIC | Age: 78
End: 2018-01-25
Payer: MEDICARE

## 2018-01-25 VITALS
HEART RATE: 80 BPM | WEIGHT: 210 LBS | SYSTOLIC BLOOD PRESSURE: 122 MMHG | RESPIRATION RATE: 12 BRPM | BODY MASS INDEX: 26.11 KG/M2 | OXYGEN SATURATION: 99 % | DIASTOLIC BLOOD PRESSURE: 80 MMHG | HEIGHT: 75 IN

## 2018-01-25 DIAGNOSIS — G47.52 REM BEHAVIORAL DISORDER: Primary | Chronic | ICD-10-CM

## 2018-01-25 DIAGNOSIS — G47.61 PLMD (PERIODIC LIMB MOVEMENT DISORDER): ICD-10-CM

## 2018-01-25 DIAGNOSIS — G47.33 OSA (OBSTRUCTIVE SLEEP APNEA): ICD-10-CM

## 2018-01-25 PROCEDURE — 99999 PR PBB SHADOW E&M-EST. PATIENT-LVL V: CPT | Mod: PBBFAC,,, | Performed by: INTERNAL MEDICINE

## 2018-01-25 PROCEDURE — 99499 UNLISTED E&M SERVICE: CPT | Mod: S$GLB,,, | Performed by: INTERNAL MEDICINE

## 2018-01-25 PROCEDURE — 99214 OFFICE O/P EST MOD 30 MIN: CPT | Mod: S$GLB,,, | Performed by: INTERNAL MEDICINE

## 2018-01-25 RX ORDER — PRAMIPEXOLE DIHYDROCHLORIDE 0.25 MG/1
0.25 TABLET ORAL NIGHTLY
Qty: 30 TABLET | Refills: 5 | Status: SHIPPED | OUTPATIENT
Start: 2018-01-25 | End: 2018-05-15

## 2018-01-25 RX ORDER — TALC
3 POWDER (GRAM) TOPICAL NIGHTLY
Qty: 30 TABLET | Refills: 11 | Status: SHIPPED | OUTPATIENT
Start: 2018-01-25 | End: 2018-02-24

## 2018-01-25 NOTE — PROGRESS NOTES
Subjective:       Srinath Mcknight is a 77 y.o. male   Last visit was 06/01/2017  Has been doing overall well.  His major sleep issues a REM behavior disorder, PLMD and obstructive sleep apnea  With regard to obstructive sleep apnea and this is borderline patient has been reluctant to use CPAP  REM behavioral events have been very infrequent less than once or twice in the month  He is stable on a regimen of clonidine 0.5 mg.    He had not  be taking his melatonin on a regular basis  Refill sent to the pharmacy  He is also taking Mirapex for his periodic limb movement  No cough no wheezing or shortness of breath      I have reviewed the patient's medical history in detail and updated the computerized patient record.    .    Previous Report(s) Reviewed: historical medical records and office notes     The following portions of the patient's history were reviewed and updated as appropriate:   He  has a past medical history of Anemia due to unknown mechanism (11/10/2015); Angina pectoris (2014); Arthritis; Back pain; Coronary artery disease; Diabetes mellitus (20 years); Diabetes mellitus type II (1994); DM (diabetes mellitus) (1994); DM (diabetes mellitus) (1994); Gout (12/05/2017); Hyperlipemia; Hypertension; CHARLES (obstructive sleep apnea); Spinal cord disease; Trouble in sleeping; Type 2 diabetes mellitus with diabetic polyneuropathy, without long-term current use of insulin; and Uses hearing aid.  He  does not have any pertinent problems on file.  He  has a past surgical history that includes Rotator cuff repair (Left, 2006); Shoulder arthroscopy w/ rotator cuff repair (Right, 2009); Laminectomy (07/22/2016); Hernia repair (Bilateral, 04/18/2017); Joint replacement (Left, 10/09/2017); and Back surgery.  His family history includes Cancer (age of onset: 50) in his brother; Diabetes in his father, mother, and sister; Drug abuse in his brother; Heart disease in his father and mother; Hypertension in his father and  mother; Stroke in his father.  He  reports that he has never smoked. He has never used smokeless tobacco. He reports that he drinks about 0.6 oz of alcohol per week . He reports that he does not use drugs.  He has a current medication list which includes the following prescription(s): accu-chek multiclix lancet, acetaminophen, allopurinol, aspirin, blood sugar diagnostic, clonazepam, docusate sodium, ferrous sulfate, gabapentin, glipizide, losartan-hydrochlorothiazide 50-12.5 mg, metformin, metoprolol succinate, multivitamin, oxycodone, pramipexole, pravastatin, sildenafil, and melatonin.  Current Outpatient Prescriptions on File Prior to Visit   Medication Sig Dispense Refill    ACCU-CHEK MULTICLIX LANCET lancets TEST BLOOD SUGAR ONE TIME DAILY 100 each 11    acetaminophen (TYLENOL) 650 MG TbSR Take 1,300 mg by mouth every 8 (eight) hours.      allopurinol (ZYLOPRIM) 100 MG tablet Take 1 tablet (100 mg total) by mouth once daily. 90 tablet 4    aspirin (ECOTRIN) 81 MG EC tablet Take 1 tablet (81 mg total) by mouth once daily.  0    blood sugar diagnostic (ACCU-CHEK FRANKO PLUS TEST STRP) Strp USE  1  STRIP ONE TIME DAILY 100 strip 4    clonazePAM (KLONOPIN) 0.25 MG TbDL DISSOLVE 2 TABLETS BY MOUTH NIGHTLY 90 tablet 0    docusate sodium (COLACE) 100 MG capsule Take 1 capsule (100 mg total) by mouth 2 (two) times daily as needed. 60 capsule 0    ferrous sulfate 324 mg (65 mg iron) TbEC Take 325 mg by mouth once daily.      gabapentin (NEURONTIN) 300 MG capsule Take 1 cap qhs for 1 wk, then 2 caps qhs x 1 wk, then 3 caps qhs or tid thereafter 90 capsule 0    glipiZIDE (GLUCOTROL) 5 MG tablet TAKE 2 TABLETS (10 MG) TWO TIMES DAILY BEFORE MEALS 360 tablet 4    losartan-hydrochlorothiazide 50-12.5 mg (HYZAAR) 50-12.5 mg per tablet TAKE 1 TABLET ONE TIME DAILY 90 tablet 4    metformin (GLUCOPHAGE) 1000 MG tablet TAKE 1 TABLET TWICE DAILY WITH MEALS 180 tablet 4    metoprolol succinate (TOPROL-XL) 50 MG 24 hr  "tablet TAKE 1 TABLET EVERY DAY 90 tablet 3    multivitamin (ONE DAILY MULTIVITAMIN) per tablet Take 1 tablet by mouth once daily.      oxyCODONE (OXYCONTIN) 10 mg 12 hr tablet Take 1 tablet (10 mg total) by mouth every 12 (twelve) hours as needed for Pain. 90 tablet 0    pravastatin (PRAVACHOL) 40 MG tablet TAKE 1 TABLET EVERY DAY 90 tablet 4    sildenafil (VIAGRA) 100 MG tablet Take 1 tablet (100 mg total) by mouth as needed. Take on an empty stomach 6 tablet 11    [DISCONTINUED] pramipexole (MIRAPEX) 0.25 MG tablet TAKE 1 TABLET BY MOUTH EVERY EVENING 30 tablet 5     No current facility-administered medications on file prior to visit.      He is allergic to sulfa (sulfonamide antibiotics)..    Review of Systems  A comprehensive review of systems was negative.    Except for left grion and knee pain, SP intrathecal lumber shot     Objective:      Vitals:    01/25/18 0920   BP: 122/80   Pulse: 80   Resp: 12   SpO2: 99%   Weight: 95.3 kg (210 lb)   Height: 6' 3" (1.905 m)     Using walker    Physical Exam   Constitutional: He is oriented to person, place, and time. He appears well-developed and well-nourished.   HENT:   Head: Normocephalic and atraumatic.   Nose: Nose normal.   Eyes: EOM are normal. Pupils are equal, round, and reactive to light. Left eye exhibits no discharge.   Neck: Normal range of motion. Neck supple. No JVD present.   Cardiovascular: Normal rate, regular rhythm and normal heart sounds.    No murmur heard.  Pulmonary/Chest: Effort normal. No respiratory distress.   Abdominal: Soft. Bowel sounds are normal.   Musculoskeletal: Normal range of motion. He exhibits no deformity.   Neurological: He is alert and oriented to person, place, and time. He has normal reflexes.   Skin: Skin is warm and dry.   Psychiatric: He has a normal mood and affect.   Nursing note and vitals reviewed.         Assessment:      Problem List Items Addressed This Visit     REM behavioral disorder - Primary (Chronic)     " Continues safety precautions at home  Klonopin 0.5 mg QHS and melatonin Po  Very infrequent events  Stable on current regime         Relevant Medications    melatonin 3 mg Tab    PLMD (periodic limb movement disorder)     On Mirapex 0.25 mg  Bed time 10-11 pm  Wake time: 9 am  Naps: occasionally         Relevant Medications    pramipexole (MIRAPEX) 0.25 MG tablet    CHARLES (obstructive sleep apnea)     AHI was 6.1/hr ( 38 events)  Borderline CHARLES   Declines interventions     Discussed Morbidity of untreated CHARLES  Still reluctant to treat              Plan:      Overall stable. Sleep should et better after musculo skeletal issues resolve    Follow-up in about 6 months (around 7/25/2018) for Sleep Hygeine, regular bed and wake time, Sleep diary , Morning exercise,.    This note was prepared using voice recognition system and is likely to have sound alike errors that may have been overlooked even after proof reading.  Please call me with any questions    Discussed diagnosis, its evaluation, treatment and usual course. All questions answered.    Thank you for the courtesy of participating in the care of this patient    Martin Machuca MD

## 2018-01-25 NOTE — ASSESSMENT & PLAN NOTE
Continues safety precautions at home  Klonopin 0.5 mg QHS and melatonin Po  Very infrequent events  Stable on current regime

## 2018-01-28 ENCOUNTER — HOSPITAL ENCOUNTER (EMERGENCY)
Facility: HOSPITAL | Age: 78
Discharge: HOME | End: 2018-01-28
Attending: EMERGENCY MEDICINE
Payer: MEDICARE

## 2018-01-28 VITALS
RESPIRATION RATE: 16 BRPM | HEART RATE: 72 BPM | DIASTOLIC BLOOD PRESSURE: 65 MMHG | BODY MASS INDEX: 26.11 KG/M2 | HEIGHT: 75 IN | TEMPERATURE: 98 F | OXYGEN SATURATION: 98 % | SYSTOLIC BLOOD PRESSURE: 144 MMHG | WEIGHT: 210 LBS

## 2018-01-28 DIAGNOSIS — M54.16 LUMBAR RADICULOPATHY: Primary | ICD-10-CM

## 2018-01-28 PROCEDURE — 99283 EMERGENCY DEPT VISIT LOW MDM: CPT | Mod: 25

## 2018-01-28 PROCEDURE — 99284 EMERGENCY DEPT VISIT MOD MDM: CPT | Mod: ,,, | Performed by: EMERGENCY MEDICINE

## 2018-01-28 PROCEDURE — 96372 THER/PROPH/DIAG INJ SC/IM: CPT

## 2018-01-28 PROCEDURE — 63600175 PHARM REV CODE 636 W HCPCS: Performed by: NURSE PRACTITIONER

## 2018-01-28 RX ORDER — KETOROLAC TROMETHAMINE 30 MG/ML
10 INJECTION, SOLUTION INTRAMUSCULAR; INTRAVENOUS
Status: DISCONTINUED | OUTPATIENT
Start: 2018-01-28 | End: 2018-01-28

## 2018-01-28 RX ORDER — KETOROLAC TROMETHAMINE 30 MG/ML
30 INJECTION, SOLUTION INTRAMUSCULAR; INTRAVENOUS
Status: COMPLETED | OUTPATIENT
Start: 2018-01-28 | End: 2018-01-28

## 2018-01-28 RX ADMIN — KETOROLAC TROMETHAMINE 30 MG: 30 INJECTION, SOLUTION INTRAMUSCULAR at 09:01

## 2018-01-28 NOTE — ED PROVIDER NOTES
"Encounter Date: 1/28/2018    SCRIBE #1 NOTE: I, Elida Ford, am scribing for, and in the presence of,  Dr. Carmichael. I have scribed the following portions of the note - the APC attestation.       History     Chief Complaint   Patient presents with    Hip Pain     L hip pain , had shot in my back last week for pain     Pt is a 78 yo male with a PMH of arthritis, Dm, HTN, CAD, chronic back pain, lumbar radiculopathy, and gout presenting to the ED for left leg pain x 1 month.  Pt states pain worse today.  Pt states "it hurts to walk now." Pt states symptoms are constant, moderate in severity and pain in located in the left groin, thigh, and radiated down to left calf.  Pt states he already takes Gabapentin 600mg BID.  Pt reports seeing Dr. Nathan for a steroid injection done on 1/24/18 with minimal relief. Pain is exacerbated with ambulation. No associated sxs reported. Patient denies any injury, strain, leg swelling, calf pain, shortness of breath, weakness, numbness, and all other sxs at this time.          Review of patient's allergies indicates:   Allergen Reactions    Sulfa (sulfonamide antibiotics)      Can't recall from 1995     Past Medical History:   Diagnosis Date    Anemia due to unknown mechanism 11/10/2015    Angina pectoris 2014    not since    Arthritis     Back pain     Coronary artery disease     Diabetes mellitus 20 years     am 09/29/2014    Diabetes mellitus type II 1994     am 12/22/2015    DM (diabetes mellitus) 1994    BS 80 am 01/10/2017    DM (diabetes mellitus) 1994     01/22/2018    Gout 12/05/2017    right foot     Hyperlipemia     Hypertension     CHARLES (obstructive sleep apnea)     Spinal cord disease     Trouble in sleeping     Type 2 diabetes mellitus with diabetic polyneuropathy, without long-term current use of insulin     Uses hearing aid     bilat     Past Surgical History:   Procedure Laterality Date    BACK SURGERY      HERNIA REPAIR Bilateral " 04/18/2017    JOINT REPLACEMENT Left 10/09/2017    L YAHIR Dr. Alcocer    LAMINECTOMY  07/22/2016    ROTATOR CUFF REPAIR Left 2006    SHOULDER ARTHROSCOPY W/ ROTATOR CUFF REPAIR Right 2009     Family History   Problem Relation Age of Onset    Hypertension Mother     Heart disease Mother     Diabetes Mother     Hypertension Father     Heart disease Father     Diabetes Father     Stroke Father     Diabetes Sister     Cancer Brother 50     pancreas    Drug abuse Brother     Prostate cancer Neg Hx     Macular degeneration Neg Hx     Retinal detachment Neg Hx     Strabismus Neg Hx     Glaucoma Neg Hx     Blindness Neg Hx     Amblyopia Neg Hx     Kidney disease Neg Hx     Mental illness Neg Hx     Mental retardation Neg Hx     COPD Neg Hx     Asthma Neg Hx      Social History   Substance Use Topics    Smoking status: Never Smoker    Smokeless tobacco: Never Used    Alcohol use 0.6 oz/week     1 Glasses of wine per week      Comment: rarely  No alcohol prior to surgery     Review of Systems   Constitutional: Negative for activity change, appetite change, chills and fever.   HENT: Negative for congestion, ear discharge, sinus pain, sinus pressure, sneezing and trouble swallowing.    Eyes: Negative for photophobia and discharge.   Respiratory: Negative for apnea, cough, chest tightness, shortness of breath and wheezing.    Cardiovascular: Negative for chest pain, palpitations and leg swelling.   Gastrointestinal: Negative for abdominal distention, abdominal pain, constipation, diarrhea and nausea.   Genitourinary: Negative for difficulty urinating, flank pain and urgency.   Musculoskeletal: Positive for arthralgias, back pain and myalgias. Negative for gait problem, joint swelling, neck pain and neck stiffness.   Skin: Negative for pallor and rash.   Allergic/Immunologic: Negative.    Neurological: Negative for dizziness, syncope, weakness, numbness and headaches.   Psychiatric/Behavioral: Negative.         Physical Exam     Initial Vitals [01/28/18 0830]   BP Pulse Resp Temp SpO2   (!) 144/65 72 16 98 °F (36.7 °C) 98 %      MAP       91.33         Physical Exam    Nursing note and vitals reviewed.  Constitutional: Vital signs are normal. He appears well-developed and well-nourished. He is not diaphoretic. He is cooperative. No distress.   HENT:   Head: Normocephalic and atraumatic.   Eyes: EOM are normal. Pupils are equal, round, and reactive to light.   Neck: Normal range of motion. Neck supple.   Cardiovascular: Normal rate, regular rhythm and normal heart sounds.   Pulses:       Radial pulses are 2+ on the right side, and 2+ on the left side.        Dorsalis pedis pulses are 2+ on the right side, and 2+ on the left side.   Pulmonary/Chest: Effort normal and breath sounds normal.   Abdominal: Soft. Bowel sounds are normal. There is no tenderness.   Musculoskeletal: Normal range of motion.        Left hip: Normal.        Lumbar back: He exhibits pain. He exhibits normal range of motion, no tenderness, no bony tenderness, no swelling, no edema, no laceration and no spasm.        Left upper leg: Normal.        Left lower leg: Normal.   Neurological: He is alert and oriented to person, place, and time. He has normal strength. No sensory deficit. He exhibits normal muscle tone. GCS eye subscore is 4. GCS verbal subscore is 5. GCS motor subscore is 6.   Skin: Skin is warm and dry. Capillary refill takes less than 2 seconds.   Psychiatric: He has a normal mood and affect. His speech is normal and behavior is normal. Judgment and thought content normal.         ED Course   Procedures  Labs Reviewed - No data to display          Medical Decision Making:   History:   Old Medical Records: I decided to obtain old medical records.  Old Records Summarized: records from clinic visits and records from previous admission(s).       <> Summary of Records: Pt followed by orthopedics and pain management for history of lumbar  "radiculopathy.  Most recent steroid injection completed on 1/24/18.    Initial Assessment:   Pt is a 78 yo male with a PMH of arthritis, Dm, HTN, CAD, chronic back pain, lumbar radiculopathy, and gout presenting to the ED for left leg pain x 1 month.  Pt denies weakness, numbness, or loss of bladder or bowel.    Differential Diagnosis:   Lumbar radiculopathy, Degenerative Disk Disease, muscle strain  I considered but do not suspect cauda equina due to physical exam.    ED Management:  Toradol 30mg IM         APC / Resident Notes:   On exam pt is nontoxic appearing and afebrile.  Pt describes pain as "shooting" down my left leg and into my calf.  Pt denies weakness, numbness, or difficulty with bowel or bladder.  Pt states that his Orthopod advised him he could increase his Gabapentin from 600 mg BID to 900 mg BID.  Advised pt to increase to this dose as well as rotating ice/heat for comfort.  Advised pt to contact pain management tomorrow if pain not decreased.  Pt and family verbalized understanding of these instructions.  I do not feel that imaging and labs are pertinent for treatment of this patient.     Discharge instructions given. Return to ED precautions discussed.   Pt is stable for discharge.     I discussed the care of this patient with my supervising MD.       Scribe Attestation:   Scribe #1: I performed the above scribed service and the documentation accurately describes the services I performed. I attest to the accuracy of the note.    Attending Attestation:     Physician Attestation Statement for NP/PA:   I have conducted a face to face encounter with this patient in addition to the NP/PA, due to Medical Complexity    Other NP/PA Attestation Additions:    History of Present Illness: 77 y.o. who has a hx of lumbar radiculopathy, for which he has had epidural spinal injections, complains of left-sided hip and upper thigh pain that became worse today.     Medical Decision Making: He has been told he can " go up on his gabapentin, but has not yet done this. We have reviewed his chart, and his most recent creatinine is within normal limits. Will give a dose of 30mg IM Toradol here and have instructed the patient to increase his gabapentin as he has been instructed to do.                  ED Course      Clinical Impression:   The encounter diagnosis was Lumbar radiculopathy.    Disposition:   Disposition: Discharged  Condition: Stable                        Gudelia Dutton NP  01/28/18 1143

## 2018-01-29 ENCOUNTER — TELEPHONE (OUTPATIENT)
Dept: PAIN MEDICINE | Facility: CLINIC | Age: 78
End: 2018-01-29

## 2018-01-29 NOTE — TELEPHONE ENCOUNTER
----- Message from Moustapha Guerra sent at 1/29/2018 10:55 AM CST -----  Contact: pt  Please give pt a call at ..[phone] (home) regarding some pains he's having from the injection he received.

## 2018-01-30 ENCOUNTER — TELEPHONE (OUTPATIENT)
Dept: ORTHOPEDICS | Facility: CLINIC | Age: 78
End: 2018-01-30

## 2018-01-30 ENCOUNTER — TELEPHONE (OUTPATIENT)
Dept: PAIN MEDICINE | Facility: CLINIC | Age: 78
End: 2018-01-30

## 2018-01-30 ENCOUNTER — OFFICE VISIT (OUTPATIENT)
Dept: ORTHOPEDICS | Facility: CLINIC | Age: 78
End: 2018-01-30
Payer: MEDICARE

## 2018-01-30 VITALS
HEIGHT: 75 IN | RESPIRATION RATE: 18 BRPM | BODY MASS INDEX: 26.11 KG/M2 | SYSTOLIC BLOOD PRESSURE: 130 MMHG | HEART RATE: 72 BPM | DIASTOLIC BLOOD PRESSURE: 69 MMHG | WEIGHT: 210 LBS

## 2018-01-30 DIAGNOSIS — M47.816 LUMBAR SPONDYLOSIS: Primary | ICD-10-CM

## 2018-01-30 PROCEDURE — 1125F AMNT PAIN NOTED PAIN PRSNT: CPT | Mod: S$GLB,,, | Performed by: ORTHOPAEDIC SURGERY

## 2018-01-30 PROCEDURE — 99214 OFFICE O/P EST MOD 30 MIN: CPT | Mod: S$GLB,,, | Performed by: ORTHOPAEDIC SURGERY

## 2018-01-30 PROCEDURE — 1159F MED LIST DOCD IN RCRD: CPT | Mod: S$GLB,,, | Performed by: ORTHOPAEDIC SURGERY

## 2018-01-30 PROCEDURE — 99999 PR PBB SHADOW E&M-EST. PATIENT-LVL III: CPT | Mod: PBBFAC,,, | Performed by: ORTHOPAEDIC SURGERY

## 2018-01-30 PROCEDURE — 3008F BODY MASS INDEX DOCD: CPT | Mod: S$GLB,,, | Performed by: ORTHOPAEDIC SURGERY

## 2018-01-30 RX ORDER — APIXABAN 2.5 MG/1
TABLET, FILM COATED ORAL
Refills: 0 | COMMUNITY
Start: 2017-12-07 | End: 2018-02-08

## 2018-01-30 RX ORDER — FUROSEMIDE 20 MG/1
TABLET ORAL
Refills: 0 | COMMUNITY
Start: 2017-11-03 | End: 2018-04-19 | Stop reason: ALTCHOICE

## 2018-01-30 RX ORDER — OXYCODONE AND ACETAMINOPHEN 10; 325 MG/1; MG/1
TABLET ORAL
Refills: 0 | COMMUNITY
Start: 2017-12-13 | End: 2018-04-19

## 2018-01-30 RX ORDER — COLCHICINE 0.6 MG/1
TABLET, FILM COATED ORAL
COMMUNITY
Start: 2017-12-28 | End: 2018-03-29

## 2018-01-30 RX ORDER — POTASSIUM CHLORIDE 20 MEQ/1
TABLET, EXTENDED RELEASE ORAL
COMMUNITY
Start: 2017-10-28 | End: 2018-04-19 | Stop reason: ALTCHOICE

## 2018-01-30 NOTE — PROGRESS NOTES
CC: This is a 77-year-old male that complains of an radiating down the left thigh down to the foot.  The patient is status post left total hip replacement.  The patient states that he is left leg is shorter than his right leg.  He indicates that upon receiving the epidural steroid injections in his lumbar spine he did have one day of relief.  However, the pain returned the next day when he placed his back in certain positions.    HPI: The patient has been treated with walker assisted ambulation.    PMH:    Past Medical History:   Diagnosis Date    Anemia due to unknown mechanism 11/10/2015    Angina pectoris 2014    not since    Arthritis     Back pain     Coronary artery disease     Diabetes mellitus 20 years     am 09/29/2014    Diabetes mellitus type II 1994     am 12/22/2015    DM (diabetes mellitus) 1994    BS 80 am 01/10/2017    DM (diabetes mellitus) 1994     01/22/2018    Gout 12/05/2017    right foot     Hyperlipemia     Hypertension     CHARLES (obstructive sleep apnea)     Spinal cord disease     Trouble in sleeping     Type 2 diabetes mellitus with diabetic polyneuropathy, without long-term current use of insulin     Uses hearing aid     bilat       PSH:    Past Surgical History:   Procedure Laterality Date    BACK SURGERY      HERNIA REPAIR Bilateral 04/18/2017    JOINT REPLACEMENT Left 10/09/2017    L YAHIR Dr. Alcocer    LAMINECTOMY  07/22/2016    ROTATOR CUFF REPAIR Left 2006    SHOULDER ARTHROSCOPY W/ ROTATOR CUFF REPAIR Right 2009       Family Hx:    Family History   Problem Relation Age of Onset    Hypertension Mother     Heart disease Mother     Diabetes Mother     Hypertension Father     Heart disease Father     Diabetes Father     Stroke Father     Diabetes Sister     Cancer Brother 50     pancreas    Drug abuse Brother     Prostate cancer Neg Hx     Macular degeneration Neg Hx     Retinal detachment Neg Hx     Strabismus Neg Hx     Glaucoma Neg  Hx     Blindness Neg Hx     Amblyopia Neg Hx     Kidney disease Neg Hx     Mental illness Neg Hx     Mental retardation Neg Hx     COPD Neg Hx     Asthma Neg Hx        Allergy:    Review of patient's allergies indicates:   Allergen Reactions    Sulfa (sulfonamide antibiotics)      Can't recall from 1995       Medication:    Current Outpatient Prescriptions:     ACCU-CHEK MULTICLIX LANCET lancets, TEST BLOOD SUGAR ONE TIME DAILY, Disp: 100 each, Rfl: 11    acetaminophen (TYLENOL) 650 MG TbSR, Take 1,300 mg by mouth every 8 (eight) hours., Disp: , Rfl:     allopurinol (ZYLOPRIM) 100 MG tablet, Take 1 tablet (100 mg total) by mouth once daily., Disp: 90 tablet, Rfl: 4    aspirin (ECOTRIN) 81 MG EC tablet, Take 1 tablet (81 mg total) by mouth once daily., Disp: , Rfl: 0    blood sugar diagnostic (ACCU-CHEK FRANKO PLUS TEST STRP) Strp, USE  1  STRIP ONE TIME DAILY, Disp: 100 strip, Rfl: 4    clonazePAM (KLONOPIN) 0.25 MG TbDL, DISSOLVE 2 TABLETS BY MOUTH NIGHTLY, Disp: 90 tablet, Rfl: 0    COLCRYS 0.6 mg tablet, , Disp: , Rfl:     docusate sodium (COLACE) 100 MG capsule, Take 1 capsule (100 mg total) by mouth 2 (two) times daily as needed., Disp: 60 capsule, Rfl: 0    ELIQUIS 2.5 mg Tab, , Disp: , Rfl: 0    ferrous sulfate 324 mg (65 mg iron) TbEC, Take 325 mg by mouth once daily., Disp: , Rfl:     furosemide (LASIX) 20 MG tablet, TK 1 T PO QD, Disp: , Rfl: 0    gabapentin (NEURONTIN) 300 MG capsule, Take 1 cap qhs for 1 wk, then 2 caps qhs x 1 wk, then 3 caps qhs or tid thereafter, Disp: 90 capsule, Rfl: 0    glipiZIDE (GLUCOTROL) 5 MG tablet, TAKE 2 TABLETS (10 MG) TWO TIMES DAILY BEFORE MEALS, Disp: 360 tablet, Rfl: 4    losartan-hydrochlorothiazide 50-12.5 mg (HYZAAR) 50-12.5 mg per tablet, TAKE 1 TABLET ONE TIME DAILY, Disp: 90 tablet, Rfl: 4    melatonin 3 mg Tab, Take 1 tablet (3 mg total) by mouth every evening., Disp: 30 tablet, Rfl: 11    metformin (GLUCOPHAGE) 1000 MG tablet, TAKE 1  "TABLET TWICE DAILY WITH MEALS, Disp: 180 tablet, Rfl: 4    metoprolol succinate (TOPROL-XL) 50 MG 24 hr tablet, TAKE 1 TABLET EVERY DAY, Disp: 90 tablet, Rfl: 3    multivitamin (ONE DAILY MULTIVITAMIN) per tablet, Take 1 tablet by mouth once daily., Disp: , Rfl:     oxyCODONE (OXYCONTIN) 10 mg 12 hr tablet, Take 1 tablet (10 mg total) by mouth every 12 (twelve) hours as needed for Pain., Disp: 90 tablet, Rfl: 0    oxyCODONE-acetaminophen (PERCOCET)  mg per tablet, TK 1 T PO Q 4 H PRN, Disp: , Rfl: 0    potassium chloride SA (K-DUR,KLOR-CON) 20 MEQ tablet, , Disp: , Rfl:     pramipexole (MIRAPEX) 0.25 MG tablet, Take 1 tablet (0.25 mg total) by mouth every evening., Disp: 30 tablet, Rfl: 5    pravastatin (PRAVACHOL) 40 MG tablet, TAKE 1 TABLET EVERY DAY, Disp: 90 tablet, Rfl: 4    sildenafil (VIAGRA) 100 MG tablet, Take 1 tablet (100 mg total) by mouth as needed. Take on an empty stomach, Disp: 6 tablet, Rfl: 11    Social History:    Social History     Social History    Marital status:      Spouse name: N/A    Number of children: 0    Years of education: N/A     Occupational History    retired      teacher     Social History Main Topics    Smoking status: Never Smoker    Smokeless tobacco: Never Used    Alcohol use 0.6 oz/week     1 Glasses of wine per week      Comment: rarely  No alcohol prior to surgery    Drug use: No    Sexual activity: Yes     Partners: Female     Birth control/ protection: Condom     Other Topics Concern    Not on file     Social History Narrative    Single lives alone. No children. Retired but some part time work - 4 hours a day. 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 Caffeine intake - sugar free cola, Rare coffee intake. Still drives. Does have a Living Will . Has a nephew Jt Gayle, lives in [place]. Has a friend Karina Rossi, locally who could help him.        Vitals:   /69   Pulse 72   Resp 18   Ht 6' 3" (1.905 m)   Wt 95.3 kg (210 lb)   BMI 26.25 " kg/m²      ROS:  GENERAL: No fever, chills, fatigability or weight loss.  SKIN: No rashes, itching or changes in color or texture of skin.  HEAD: No headaches or recent head trauma.  EYES: Visual acuity fine. No photophobia, ocular pain or diplopia.  EARS: Denies ear pain, discharge or vertigo.  NOSE: No loss of smell, no epistaxis or postnasal drip.  MOUTH & THROAT: No hoarseness or change in voice. No excessive gum bleeding.  NODES: Denies swollen glands.  CHEST: Denies DAMON, cyanosis, wheezing, cough and sputum production.  CARDIOVASCULAR: Denies chest pain, PND, orthopnea or reduced exercise tolerance.  ABDOMEN: Appetite fine. No weight loss. Denies diarrhea, abdominal pain, hematemesis or blood in stool.  URINARY: No flank pain, dysuria or hematuria.  PERIPHERAL VASCULAR: No claudication or cyanosis.  NEUROLOGIC: No history of seizures, paralysis, alteration of gait or coordination.  MUSCULOSKELETAL: See HPI    PE:  APPEARANCE: Well nourished, well developed, in no acute distress.   HEAD: Normocephalic, atraumatic.  EYES: PERRL. EOMI.   EARS: TM's intact. Light reflex normal. No retraction or perforation.   NOSE: Mucosa pink. Airway clear.  MOUTH & THROAT: No tonsillar enlargement. No pharyngeal erythema or exudate. No stridor.  NECK: Supple.   NODES: No cervical, axillary or inguinal lymph node enlargement.  CHEST: Lungs clear to auscultation.  CARDIOVASCULAR: Normal S1, S2. No rubs, murmurs or gallops.  ABDOMEN: Bowel sounds normal. Not distended. Soft. No tenderness or masses.  NEUROLOGIC: Cranial Nerves: II-XII grossly intact, also see MUSCULOSKELETAL     Lumbar spine-decreased range of motion, symmetrical deep tendon reflexes and motor strength as well as sensory examination bilateral lower extremities.       Left   Hip   -  2 plus dorsalis pedis and posterior tibial artery pulses, light touch intact Left lower extremity.  All digits are warm. No erythema, no warmth, no drainage, no swelling, no significant  tenderness.  Less than 2 seconds capillary refill all digits.           Assessment:   I did review the patient's plain hip x-rays.  I did show him the relationship between the issue tuberosity and the lesser trochanter in both hips.  The patient understands that he could have a leg length discrepancy however it would not have been influenced by the total hip replacement.  The patient does have a an appointment with Dr. Iqbal, the spine surgeon.  However, the patient decided to come to visit today.           Diagnosis:              1.  Status post left total hip replacement               2.  Lumbar radiculopathy    Diagnostic Studies  MRI-No  X-Ray-No  EMG/NCV-No  Arthrogram-No  Bone Scan-No  CT Scan-No  Doppler-No  ESR-No  CRP-No  CBC with Diff-No   Rheumatoid/Arthritis Panel-No      Plan:                                                 1. PT-yes                                                 2.OT-no                                          3.NSAID-yes                                        4. Narcotics-no                                     5. Wound care-N/A                                 6. Rest-yes                                           7. Surgery-no                                         8. BA Hose-no                                    9. Anticoagulation therapy-no               10. Elevation-no                                     11. Crutches-no                                    12. Walker-no             13. Cane no                        14. Referral-to physiatry or a spine surgeon for lumbar spine problems.                                     15.Injection-no                            16. Splint   /    Cast   /   Cast Shoe-No              17. RICE-none            18. Follow up- 3 months

## 2018-01-30 NOTE — TELEPHONE ENCOUNTER
----- Message from Haydee Mckeon LPN sent at 1/29/2018  5:01 PM CST -----  Contact: Pt      ----- Message -----  From: Nava Funez  Sent: 1/29/2018   2:41 PM  To: [facility] Staff    Pt request a call from the nurse because he went to the ER last night and got an injection and is still in pain, please contact the pt at [phone]

## 2018-01-30 NOTE — TELEPHONE ENCOUNTER
----- Message from Chaparro Ta sent at 1/30/2018 12:18 PM CST -----  Contact: pt  She's calling in regards to an ER f/u appt... Pt states he is having severe Hip pain (LT), please advise, [phone] (home), pt states this is his 3rd message and has not heard from anyone concerning this..the patient states he needs to be seen today....[phone] (home)

## 2018-01-31 ENCOUNTER — TELEPHONE (OUTPATIENT)
Dept: PAIN MEDICINE | Facility: CLINIC | Age: 78
End: 2018-01-31

## 2018-02-01 NOTE — PATIENT INSTRUCTIONS
Degenerative Disk Disease    Spinal disks are gel-filled cushions between the bones, or vertebrae, of the spine. The disks act like shock absorbers. Over time, the disks may break down. This is called degenerative disk disease.  This condition can affect the neck or back. It is one of the most common causes of low back pain. It is the leading cause of disability in people under age 45 in the [place]. The pain often remains localized to the lower back or neck. Muscle spasm is often present and adds to the pain.  Disk degeneration is a natural part of aging. But it is not painful for most people. It may also occur as a result of repeated minor injuries due to daily activities, sports, or accidents. It may lead to osteoarthritis of the spine. Back pain related to disk disease may come and go. Or it may become chronic and last for months or years. The disk may bulge or rupture. This is called a slipped disk or herniated disk. That can put pressure on a nearby spinal nerve and cause neck or back pain that spreads down one arm or leg.  X-rays or an MRI may help to diagnose this condition. For acute pain, treatment includes anti-inflammatory medicines, muscle relaxants, rest, ice, or heat. Strong prescription pain medicines, called opioids, may be needed for short-term treatment if pain suddenly gets worse. Opioid medicines can be addictive. So they are not advised for long-term pain management. Other types of medicines are preferred. Surgery is generally not used to treat this condition unless there is a complication.    Home care  · For neck pain: Use a comfortable pillow that supports the head and keeps the spine in a neutral position. Your head should not be tilted forward or backward.  · For back pain: Avoid sitting for long periods of time. This puts more stress on the lower back than standing or walking. Starting a regular exercise program to strengthen the supporting muscles of the spine will make it easier  to live with degenerative disk disease.  · Apply an ice pack over the injured area for no more than 15 to 20 minutes. Do this every 3 to 6 hours for the first 24 to 48 hours. To make an ice pack, put ice cubes in a plastic bag that seals at the top. Wrap the bag in a clean, thin towel or cloth. Never put ice or an ice pack directly on the skin. Keep using ice packs to ease pain and swelling as needed. After 48 hours, apply heat (warm shower or warm bath) for 20 minutes several times a day, or switch between ice and heat.   · You may use over-the-counter pain medicine to control pain, unless another pain medicine was prescribed. If you have chronic liver or kidney disease or ever had a stomach ulcer or GI bleeding, talk with your provider before using these medicines.  Follow-up care  Follow up with your healthcare provider, or as directed.  If X-rays, a CT scan or an MRI were done, you will be notified of any new findings that may affect your care.  When to seek medical advice  Contact your healthcare provider right away if any of these occur:  · Increasing back pain  · Your foot drags when you walk, a condition called foot drop  · You have new weakness, numbness, or pain in one or both arms or legs  · Loss of bowel or bladder control  · Numbness or tingling in the buttock or groin area  Date Last Reviewed: 11/23/2015  © 5508-2287 [employer]. [address] [place] [address], [place] [address]. All rights reserved. This information is not intended as a substitute for professional medical care. Always follow your healthcare professional's instructions.

## 2018-02-05 ENCOUNTER — PATIENT OUTREACH (OUTPATIENT)
Dept: ADMINISTRATIVE | Facility: HOSPITAL | Age: 78
End: 2018-02-05

## 2018-02-05 ENCOUNTER — TELEPHONE (OUTPATIENT)
Dept: CARDIOLOGY | Facility: CLINIC | Age: 78
End: 2018-02-05

## 2018-02-05 DIAGNOSIS — M54.50 LUMBAR SPINE PAIN: Primary | ICD-10-CM

## 2018-02-05 NOTE — TELEPHONE ENCOUNTER
Returned call and patient was informed clearance was faxed 02/01/2018 .    Patient verbalized understanding.

## 2018-02-05 NOTE — PROGRESS NOTES
Last documented 2017 Medication reconcilition Post d/c (11/2/17) has been sent to [facility] as attestation documentation for MEDICATION RECONCILITION. Measure has been met.

## 2018-02-08 ENCOUNTER — OFFICE VISIT (OUTPATIENT)
Dept: PAIN MEDICINE | Facility: CLINIC | Age: 78
End: 2018-02-08
Payer: MEDICARE

## 2018-02-08 ENCOUNTER — OFFICE VISIT (OUTPATIENT)
Dept: INTERNAL MEDICINE | Facility: CLINIC | Age: 78
End: 2018-02-08
Payer: MEDICARE

## 2018-02-08 ENCOUNTER — HOSPITAL ENCOUNTER (OUTPATIENT)
Dept: RADIOLOGY | Facility: HOSPITAL | Age: 78
Discharge: HOME | End: 2018-02-08
Attending: INTERNAL MEDICINE
Payer: MEDICARE

## 2018-02-08 VITALS
BODY MASS INDEX: 27.58 KG/M2 | SYSTOLIC BLOOD PRESSURE: 132 MMHG | HEIGHT: 75 IN | TEMPERATURE: 97 F | DIASTOLIC BLOOD PRESSURE: 80 MMHG | HEART RATE: 70 BPM | WEIGHT: 221.81 LBS

## 2018-02-08 VITALS
RESPIRATION RATE: 18 BRPM | BODY MASS INDEX: 27.48 KG/M2 | DIASTOLIC BLOOD PRESSURE: 61 MMHG | WEIGHT: 221 LBS | HEIGHT: 75 IN | SYSTOLIC BLOOD PRESSURE: 117 MMHG | HEART RATE: 71 BPM

## 2018-02-08 DIAGNOSIS — E78.2 COMBINED HYPERLIPIDEMIA ASSOCIATED WITH TYPE 2 DIABETES MELLITUS: Chronic | ICD-10-CM

## 2018-02-08 DIAGNOSIS — M47.816 LUMBAR SPONDYLOSIS: Primary | ICD-10-CM

## 2018-02-08 DIAGNOSIS — M54.16 LUMBAR RADICULOPATHY: ICD-10-CM

## 2018-02-08 DIAGNOSIS — Z01.818 PREOP EXAM FOR INTERNAL MEDICINE: Primary | ICD-10-CM

## 2018-02-08 DIAGNOSIS — G47.33 OSA (OBSTRUCTIVE SLEEP APNEA): ICD-10-CM

## 2018-02-08 DIAGNOSIS — I25.10 CORONARY ARTERY DISEASE, OCCLUSIVE: ICD-10-CM

## 2018-02-08 DIAGNOSIS — M48.062 SPINAL STENOSIS OF LUMBAR REGION WITH NEUROGENIC CLAUDICATION: ICD-10-CM

## 2018-02-08 DIAGNOSIS — Z01.818 PRE-OP EVALUATION: ICD-10-CM

## 2018-02-08 DIAGNOSIS — E11.42 TYPE 2 DIABETES MELLITUS WITH DIABETIC POLYNEUROPATHY, WITHOUT LONG-TERM CURRENT USE OF INSULIN: ICD-10-CM

## 2018-02-08 DIAGNOSIS — Z01.818 PREOP EXAM FOR INTERNAL MEDICINE: ICD-10-CM

## 2018-02-08 DIAGNOSIS — E11.59 HYPERTENSION ASSOCIATED WITH DIABETES: Chronic | ICD-10-CM

## 2018-02-08 DIAGNOSIS — I15.2 HYPERTENSION ASSOCIATED WITH DIABETES: Chronic | ICD-10-CM

## 2018-02-08 DIAGNOSIS — E11.69 COMBINED HYPERLIPIDEMIA ASSOCIATED WITH TYPE 2 DIABETES MELLITUS: Chronic | ICD-10-CM

## 2018-02-08 DIAGNOSIS — M53.3 SACROILIAC JOINT PAIN: ICD-10-CM

## 2018-02-08 PROBLEM — M70.22 OLECRANON BURSITIS OF LEFT ELBOW: Status: RESOLVED | Noted: 2017-10-26 | Resolved: 2018-02-08

## 2018-02-08 PROCEDURE — 99214 OFFICE O/P EST MOD 30 MIN: CPT | Mod: S$GLB,,, | Performed by: ANESTHESIOLOGY

## 2018-02-08 PROCEDURE — 3008F BODY MASS INDEX DOCD: CPT | Mod: S$GLB,,, | Performed by: INTERNAL MEDICINE

## 2018-02-08 PROCEDURE — 1159F MED LIST DOCD IN RCRD: CPT | Mod: S$GLB,,, | Performed by: INTERNAL MEDICINE

## 2018-02-08 PROCEDURE — 99214 OFFICE O/P EST MOD 30 MIN: CPT | Mod: S$GLB,,, | Performed by: INTERNAL MEDICINE

## 2018-02-08 PROCEDURE — 71046 X-RAY EXAM CHEST 2 VIEWS: CPT | Mod: 26,,, | Performed by: RADIOLOGY

## 2018-02-08 PROCEDURE — 99499 UNLISTED E&M SERVICE: CPT | Mod: S$GLB,,, | Performed by: INTERNAL MEDICINE

## 2018-02-08 PROCEDURE — 1159F MED LIST DOCD IN RCRD: CPT | Mod: S$GLB,,, | Performed by: ANESTHESIOLOGY

## 2018-02-08 PROCEDURE — 1126F AMNT PAIN NOTED NONE PRSNT: CPT | Mod: S$GLB,,, | Performed by: INTERNAL MEDICINE

## 2018-02-08 PROCEDURE — 99999 PR PBB SHADOW E&M-EST. PATIENT-LVL III: CPT | Mod: PBBFAC,,, | Performed by: INTERNAL MEDICINE

## 2018-02-08 PROCEDURE — 3008F BODY MASS INDEX DOCD: CPT | Mod: S$GLB,,, | Performed by: ANESTHESIOLOGY

## 2018-02-08 PROCEDURE — 93005 ELECTROCARDIOGRAM TRACING: CPT | Mod: S$GLB,,, | Performed by: INTERNAL MEDICINE

## 2018-02-08 PROCEDURE — 71046 X-RAY EXAM CHEST 2 VIEWS: CPT | Mod: TC,FY,PO

## 2018-02-08 PROCEDURE — 1125F AMNT PAIN NOTED PAIN PRSNT: CPT | Mod: S$GLB,,, | Performed by: ANESTHESIOLOGY

## 2018-02-08 PROCEDURE — 93010 ELECTROCARDIOGRAM REPORT: CPT | Mod: S$GLB,,, | Performed by: INTERNAL MEDICINE

## 2018-02-08 PROCEDURE — 99999 PR PBB SHADOW E&M-EST. PATIENT-LVL IV: CPT | Mod: PBBFAC,,, | Performed by: ANESTHESIOLOGY

## 2018-02-08 RX ORDER — CELECOXIB 200 MG/1
200 CAPSULE ORAL DAILY
Qty: 45 CAPSULE | Refills: 0 | Status: SHIPPED | OUTPATIENT
Start: 2018-02-08 | End: 2018-02-08 | Stop reason: SDUPTHER

## 2018-02-08 RX ORDER — CELECOXIB 200 MG/1
CAPSULE ORAL
Qty: 90 CAPSULE | Refills: 0 | Status: SHIPPED | OUTPATIENT
Start: 2018-02-08 | End: 2020-01-16

## 2018-02-08 NOTE — PROGRESS NOTES
Subjective:       Patient ID: Srinath Mcknight is a 77 y.o. male.    Chief Complaint: Pre-op Exam    HPI Patient is a 77-year-old male presenting today for perioperative risk assessment requested by Dr. Jeffy Iqbal.  Patient has a history of lumbar disc disease and some spinal stenosis with evidence of some neurogenic claudication.  They are planning to go forward with surgical intervention via a lumbar foraminotomy to improve the symptoms.  Projected date of surgery is March 6, 2018.    Mr. Mcknight has had numerous surgical interventions in the past.  He relates no significant problems with surgery in the past.  He has had no anesthesia reactions.  No febrile reactions.  He relates no history of hemorrhaging.    He has a history of coronary artery disease.  He has been asymptomatic with this.  He is well-controlled medications.  He had a stress test performed at the end of the summer in 2017 which she did well with.  There is no evidence of cardiac decompensation and no symptoms of cardiac decompensation.    Patient has no underlying history of lung disease.  He denies any shortness of breath or lifestyle limiting symptoms from a respiratory standpoint.    The patient is a type II diabetic.  He has had excellent control of his sugars.  His last A1c is 6.5.  He relates no issues with hypoglycemia or significant hyperglycemia.    Past Medical History:   Diagnosis Date    Anemia due to unknown mechanism 11/10/2015    Angina pectoris 2014    not since    Arthritis     Back pain     Coronary artery disease     Diabetes mellitus 20 years     am 09/29/2014    Diabetes mellitus type II 1994     am 12/22/2015    DM (diabetes mellitus) 1994    BS 80 am 01/10/2017    DM (diabetes mellitus) 1994     01/22/2018    Gout 12/05/2017    right foot     Hyperlipemia     Hypertension     CHARLES (obstructive sleep apnea)     Spinal cord disease     Trouble in sleeping     Type 2 diabetes mellitus with  diabetic polyneuropathy, without long-term current use of insulin     Uses hearing aid     bilat     Past Surgical History:   Procedure Laterality Date    BACK SURGERY      HERNIA REPAIR Bilateral 04/18/2017    JOINT REPLACEMENT Left 10/09/2017    L YAHIR Dr. Alcocer    LAMINECTOMY  07/22/2016    ROTATOR CUFF REPAIR Left 2006    SHOULDER ARTHROSCOPY W/ ROTATOR CUFF REPAIR Right 2009     Social History   Substance Use Topics    Smoking status: Never Smoker    Smokeless tobacco: Never Used    Alcohol use 0.6 oz/week     1 Glasses of wine per week      Comment: rarely  No alcohol prior to surgery     Family History   Problem Relation Age of Onset    Hypertension Mother     Heart disease Mother     Diabetes Mother     Hypertension Father     Heart disease Father     Diabetes Father     Stroke Father     Diabetes Sister     Cancer Brother 50     pancreas    Drug abuse Brother     Prostate cancer Neg Hx     Macular degeneration Neg Hx     Retinal detachment Neg Hx     Strabismus Neg Hx     Glaucoma Neg Hx     Blindness Neg Hx     Amblyopia Neg Hx     Kidney disease Neg Hx     Mental illness Neg Hx     Mental retardation Neg Hx     COPD Neg Hx     Asthma Neg Hx      Current Outpatient Prescriptions   Medication Sig Dispense Refill    ACCU-CHEK MULTICLIX LANCET lancets TEST BLOOD SUGAR ONE TIME DAILY 100 each 11    acetaminophen (TYLENOL) 650 MG TbSR Take 1,300 mg by mouth every 8 (eight) hours.      allopurinol (ZYLOPRIM) 100 MG tablet Take 1 tablet (100 mg total) by mouth once daily. 90 tablet 4    aspirin (ECOTRIN) 81 MG EC tablet Take 1 tablet (81 mg total) by mouth once daily.  0    blood sugar diagnostic (ACCU-CHEK FRANKO PLUS TEST STRP) Strp USE  1  STRIP ONE TIME DAILY 100 strip 4    clonazePAM (KLONOPIN) 0.25 MG TbDL DISSOLVE 2 TABLETS BY MOUTH NIGHTLY 90 tablet 0    COLCRYS 0.6 mg tablet       docusate sodium (COLACE) 100 MG capsule Take 1 capsule (100 mg total) by mouth 2 (two)  times daily as needed. 60 capsule 0    ferrous sulfate 324 mg (65 mg iron) TbEC Take 325 mg by mouth once daily.      furosemide (LASIX) 20 MG tablet TK 1 T PO QD  0    gabapentin (NEURONTIN) 300 MG capsule Take 1 cap qhs for 1 wk, then 2 caps qhs x 1 wk, then 3 caps qhs or tid thereafter 90 capsule 0    glipiZIDE (GLUCOTROL) 5 MG tablet TAKE 2 TABLETS (10 MG) TWO TIMES DAILY BEFORE MEALS 360 tablet 4    losartan-hydrochlorothiazide 50-12.5 mg (HYZAAR) 50-12.5 mg per tablet TAKE 1 TABLET ONE TIME DAILY 90 tablet 4    melatonin 3 mg Tab Take 1 tablet (3 mg total) by mouth every evening. 30 tablet 11    metformin (GLUCOPHAGE) 1000 MG tablet TAKE 1 TABLET TWICE DAILY WITH MEALS 180 tablet 4    metoprolol succinate (TOPROL-XL) 50 MG 24 hr tablet TAKE 1 TABLET EVERY DAY 90 tablet 3    multivitamin (ONE DAILY MULTIVITAMIN) per tablet Take 1 tablet by mouth once daily.      oxyCODONE (OXYCONTIN) 10 mg 12 hr tablet Take 1 tablet (10 mg total) by mouth every 12 (twelve) hours as needed for Pain. 90 tablet 0    oxyCODONE-acetaminophen (PERCOCET)  mg per tablet TK 1 T PO Q 4 H PRN  0    potassium chloride SA (K-DUR,KLOR-CON) 20 MEQ tablet       pramipexole (MIRAPEX) 0.25 MG tablet Take 1 tablet (0.25 mg total) by mouth every evening. 30 tablet 5    pravastatin (PRAVACHOL) 40 MG tablet TAKE 1 TABLET EVERY DAY 90 tablet 4    sildenafil (VIAGRA) 100 MG tablet Take 1 tablet (100 mg total) by mouth as needed. Take on an empty stomach 6 tablet 11     No current facility-administered medications for this visit.      Review of patient's allergies indicates:   Allergen Reactions    Sulfa (sulfonamide antibiotics)      Can't recall from 1995       Review of Systems   Constitutional: Negative for fever and unexpected weight change.   HENT: Negative for hearing loss, postnasal drip and rhinorrhea.    Eyes: Negative for pain and visual disturbance.   Respiratory: Negative for cough, shortness of breath and wheezing.   "  Cardiovascular: Negative for chest pain and palpitations.   Gastrointestinal: Negative for constipation, diarrhea, nausea and vomiting.   Genitourinary: Negative for dysuria and hematuria.   Musculoskeletal: Positive for back pain. Negative for arthralgias, myalgias and neck stiffness.   Skin: Negative for pallor and rash.   Neurological: Positive for numbness. Negative for seizures, syncope and headaches.   Hematological: Negative for adenopathy.   Psychiatric/Behavioral: Negative for dysphoric mood. The patient is not nervous/anxious.        Objective:   /80   Pulse 70   Temp 97.4 °F (36.3 °C)   Ht 6' 3" (1.905 m)   Wt 100.6 kg (221 lb 12.5 oz)   BMI 27.72 kg/m²      Physical Exam   Constitutional: He is oriented to person, place, and time. He appears well-developed and well-nourished. No distress.   HENT:   Head: Normocephalic and atraumatic.   Mouth/Throat: Oropharynx is clear and moist.   Eyes: EOM are normal. Pupils are equal, round, and reactive to light.   Neck: Normal range of motion. Neck supple. No JVD present. No thyromegaly present.   Cardiovascular: Normal rate, regular rhythm, normal heart sounds and intact distal pulses.  Exam reveals no gallop and no friction rub.    No murmur heard.  Pulmonary/Chest: Effort normal and breath sounds normal. He has no wheezes. He has no rales.   Abdominal: Soft. Bowel sounds are normal. He exhibits no distension. There is no tenderness. There is no rebound and no guarding.   Musculoskeletal: Normal range of motion. He exhibits no edema.   Lymphadenopathy:     He has no cervical adenopathy.   Neurological: He is alert and oriented to person, place, and time. He has normal reflexes. No cranial nerve deficit.   Skin: Skin is warm and dry. No rash noted.   Psychiatric: He has a normal mood and affect. Judgment normal.   Vitals reviewed.      Requested labs are pending.  Chest x-ray is pending.    EKG revealed normal sinus rhythm at 69 bpm.  No acute ischemic " changes.  No arrhythmia.    Date of Procedure: 09/07/2017    PRE-TEST DATA   EKG: EKG demonstrates adequate. Resting electrocardiogram reveals sinus rhythm at a rate of 55 bpm. There was non-specific abnormality, ST segment, and/or T wave.     TEST DESCRIPTION   The patient received 0.4 mg of Regadenoson as an IV bolus. Peak heart rate was 80 bpm, which is 55% of the age predicted maximum heart rate. .     EKG Conclusions:    1. The EKG portion of this study is negative for ischemia at a moderate workload, and peak heart rate of 80 bpm (55% of predicted).   2. Blood pressure remained stable throughout the protocol  (Presenting BP: 119/70 Peak BP: 114/49).   3. No significant arrhythmias were present.   4. There were no symptoms of chest discomfort or significant dyspnea throughout the protocol.     Nuclear Procedure:  Following a single isotope protocol, 8 mCi of Tc99 labeled Tetrofosmin was given at rest and tomographic imaging was performed. Regadenoson pharmacologic stress testing was performed as described above. Immediately following the IV bolus of regadenoson,   28 mCi of Tc99 labeled Tetrofosmin was given and tomographic imaging was performed. The site of the IV injection was the left AC. Images were obtained on a [employer] camera.     Comments:  This is a technically excellent study. Inspection of the transaxial images demonstrated no significant cranial, caudal, or lateral patient motion in the camera between rest and stress acquisitions. There is homogeneous uptake of radiotracer in all walls   of the myocardium on stress and rest images. The extracardiac distribution of radioactivity is normal. The left ventricular cavity is normal in size and does not increase with stress. On gated SPECT, left ventricular motion is normal at rest.     Nuclear Quantitative Functional Analysis:   LVEF: 55 %    Impression: NORMAL MYOCARDIAL PERFUSION  1. The perfusion scan is free of evidence for myocardial ischemia or  injury.   2. Resting wall motion is physiologic.   3. Resting LV function is normal.   4. The ventricular volumes are normal at rest and stress.   5. The extracardiac distribution of radioactivity is normal.           This document has been electronically    SIGNED BY: Raghav Perez MD On: 09/07/2017 17:53      Assessment:       1. Preop exam for internal medicine    2. Spinal stenosis of lumbar region with neurogenic claudication    3. Coronary artery disease, occlusive    4. Type 2 diabetes mellitus with diabetic polyneuropathy, without long-term current use of insulin    5. Hypertension associated with diabetes    6. Combined hyperlipidemia associated with type 2 diabetes mellitus    7. Pre-op evaluation    8. CHARLES (obstructive sleep apnea)        Plan:   No problem-specific Assessment & Plan notes found for this encounter.    [place] was seen today for pre-op exam.    Diagnoses and all orders for this visit:    Preop exam for internal medicine  -     X-Ray Chest PA And Lateral; Future  -     IN OFFICE EKG 12-LEAD (to Muse)  -     CBC auto differential; Future  -     Comprehensive metabolic panel; Future  -     Protime-INR; Future  -     APTT; Future  -     URINALYSIS; Future    Spinal stenosis of lumbar region with neurogenic claudication    Coronary artery disease, occlusive    Type 2 diabetes mellitus with diabetic polyneuropathy, without long-term current use of insulin    Hypertension associated with diabetes    Combined hyperlipidemia associated with type 2 diabetes mellitus    Pre-op evaluation  -     IN OFFICE EKG 12-LEAD (to Muse)    CHARLES (obstructive sleep apnea)     I have reviewed the patient's past medical history and physical exam findings.  Make the following recommendations in regard to surgery.    From a cardiac standpoint the patient presents as a good candidate for surgery.  He has a history of coronary artery disease which is been controlled.  Stress testing performed in September 2017 showed no  evidence of myocardial ischemia.  He may proceed with surgery without further cardiac workup at this time.    From a pulmonary standpoint he presents as a good candidate for surgery.  He has no underlying lung disease and no symptoms of a pulmonary condition at this time.  Good pulmonary toilet, and some spirometry, and early ambulation are recommended to maximize pulmonary outcome    DVT prophylaxis as per standard.    Patient has been instructed to hold his diabetes medications the morning of surgery.  Anesthesia can monitor his sugars and make adjustments as necessary.  He will resume his oral diabetes medications 1 by mouth intake resumes.    He has been instructed to hold aspirin and anti-inflammatory products starting 5 days prior to surgery.  He may resume his aspirin the morning after surgery.    Patient has a history of sleep apnea.  Monitoring for postanesthesia apnea would be appropriate.  He may benefit from BiPAP post surgery as he comes out from under anesthesia.    I will forward the results of the labs and x-rays upon their receipt.    If there is anything further I can to to assist in this patient's care please not hesitate contact me.    Follow-up if symptoms worsen or fail to improve.

## 2018-02-08 NOTE — PATIENT INSTRUCTIONS
1.  Stop aspirin five days before surgery.  Restart aspirin the day after surgery.  2. Hold your diabetes medications the day of surgery (metformin and glipizide).  Restart the medications the day following surgery.

## 2018-02-08 NOTE — PROGRESS NOTES
Chief Pain Complaint:  Left Lumbar Back pain with radiation down left leg    Interval History: The patient was last seen 1/24/2018. At that time he underwent Left L4/5 L5/S1 Lumbar Transforaminal Epidural Steroid Injection under Fluoroscopic Guidance. The patient reports that  he is/was unchanged following the procedure. He had no relief of pain with injection.     History of Present Illness:   Srinath Mcknight is a 77 y.o. male  who is presenting with a chief complaint of Left Lumbar Back pain with radiation down left leg. The patient began experiencing this problem insidiously, and the pain has been gradually worsening over the past 3 month(s). The pain is described as throbbing, shooting, burning and electrical and is located in the left lumbar spine. Pain is intermittent and lasts hours. The pain radiates to  left leg anterior thigh and shin. The patient rates his pain a 8 out of ten and interferes with activities of daily living a 7 out of ten. Pain is exacerbated by flexion of the lumbar spine, and is improved by rest. Patient reports no prior trauma,  prior spinal surgery Laminectomy at L4-L5, prior hip surgery Left total hip replacement.     - pertinent negatives: No fever, No chills, No weight loss, No bladder dysfunction, No bowel dysfunction, No saddle anesthesia  - pertinent positives: left leg weakness    - medications, other therapies tried (physical therapy, injections):     >> NSAIDs, Tylenol, Percocet and oxycodone    >> Has previously undergone Physical Therapy    >> Has NOT previously undergone spinal injection/s      Imaging / Labs / Studies (reviewed on 2/8/2018):    Results for orders placed during the hospital encounter of 05/02/16   MRI Lumbar Spine Without Contrast    Narrative MRI OF THE LUMBAR SPINE WITHOUT CONTRAST.     Technique:  Sagittal T1, sagittal T2, sagittal STIR, axial T1 and axial T2 weighted images of the lumbar spine obtained without contrast.     Comparison: MRI Lumbar spine  without contrast from 11/12/14     Findings:  The there is minimal (grade I) the retrolisthesis of L5 on S1.  Lumbar spinal alignment is otherwise within normal limits.  There is no evidence of acute fracture.  Vertebral body heights are maintained.  No abnormal marrow signals identified to suggest an infiltrative process.  The conus is normal in appearance and terminates at the L1 level.    There is intervertebral disc space narrowing and desiccation of discs within the lower lumbar spine.  There findings of multilevel lumbar spondylosis as detailed below.    L1-L2: No significant central canal stenosis or neuroforaminal narrowing.    L2-L3: There is a mild broad-based disc bulge without significant central canal stenosis or neuroforaminal narrowing.     L3-L4: There is a broad-based disc bulge and bilateral facet arthropathy with associated synovial cyst arising from the left facet joint measuring approximately 5 x 9 mm, similar to the prior examination.  This results in moderate central canal stenosis, slightly progressed from the prior examination.  There is mild bilateral neuroforaminal narrowing.     L4-L5: There is a broad-based disc bulge and prominent bilateral facet arthropathy resulting in severe central canal stenosis and mild bilateral neuroforaminal narrowing.     L5-S1: There is a broad-based disc bulge and a bilateral facet arthropathy resulting in mild bilateral neuroforaminal narrowing.  No significant central canal stenosis.     The visualized intra-abdominal contents demonstrate no significant abnormalities.  The bilateral SI joints are unremarkable.  The paraspinal musculature is within normal limits.    Impression      1.  Lumbar spondylosis resulting in moderate central canal stenosis at L3-L4 and severe central canal stenosis at L4-L5.  Mild neuroforaminal narrowing noted at L3-L4 through L5-S1.  Findings are slightly progressed from the prior examination from 11/12/14.    2.  Prominent facet  arthropathy noted within the lower lumbar spine with a grossly stable appearing synovial cyst arising from the left facet joint at L3-L4.    ______________________________________     Electronically signed by resident: Eduar Perdomo  Date:     05/02/16  Time:    10:40       ______________________________________     Electronically signed by: Abel Nieves MD  Date:     05/02/16  Time:    10:49      Results for orders placed during the hospital encounter of 12/20/17   MRI Lumbar Spine W WO Contrast    Narrative EXAM: [id number] LUMBAR SPINE W WO CONTRAST    CLINICAL HISTORY: Left sided sciatic pain.  Aching and shooting pain 9-10 severity.    TECHNIQUE: MR Lumbar spine with contrast. Sagittal T1, T2, STIR. Axial T1, T2. Post contrast Sagittal and Axial T1.    COMPARISON: Previous MRI of the lumbar spine from 05/02/2016.    FINDINGS:    Since the previous study there has been a laminectomy at L4-L5. There is enhancement of granulation tissue at the operative site.  No abnormal enhancement surrounds the exiting nerve roots.  The thecal sac measures 9 mm AP at this level.  There is slight mass effect upon the lateral recess without definite compression of the descending L5 nerve roots. There is a synovial cyst with peripheral enhancement projecting centrally and inferiorly from the left facet joint at L3-L4 that demonstrates peripheral enhancement.  It causes mild spinal stenosis at the level of L4 and is slightly larger than on the previous study from May 2016. The conus medullaris terminates at the level ofL1-L2. No abnormal signal within the conus. Intervertebral disc levels are as follows:    T12-L1 disc: No significant disc pathology. No spinal canal or neural foraminal stenosis.    L1-L2 disc : No significant disc pathology. No spinal canal or neural foraminal stenosis.    L2-L3 disc: Normal disc height with mild degenerative facet changes and thickening of ligamentum flavum.  No significant canal or foraminal  stenosis.    L3-L4 disc: Partial distal desiccation with a broad-based posterior disc bulge.  There is a far left lateral annular fissure of the disc.  Moderate to severe degenerative facet change with bilateral facet joint effusions.  The thecal sac measures 10 mm AP but is about 50% of normal cross-sectional area.  Disc material bulges into the floor of the left exit foramen and causes mild to moderate left foraminal stenosis.  There is minimal right foraminal stenosis.  Additionally, there is a synovial cyst projects centrally and inferiorly from the left facet joint.  The synovial cyst demonstrates peripheral enhancement and measures 11 mm craniocaudal by 11 mm transverse by 5 mm AP.  It is best demonstrated on axial T2 series 6 image 23.  It causes mild thecal sac stenosis without definite neural compression.  There is a smaller synovial cyst projecting toward the left as well, remote from any neural structures.    L4-L5 disc: Level of interval laminectomy.  There is enhancing granulation tissue at the operative site without enhancement surrounding the exiting nerve roots.  The thecal sac measures 9 mm AP.  There is mild mass effect upon the lateral recesses without definite compression no descending L5 nerve roots.  Minimal bilateral foraminal stenosis.    L5-S1 disc: Partial does desiccation and disc space height loss.  Height loss is most severe toward the left where there are marginal osteophytes.  Osteophyte material encroaches into the floor of the left exit foramen causing moderate to severe left foraminal stenosis.  The right neural foramen is minimally narrowed.  Moderate degenerative facet change.  The thecal sac measures 12 mm AP.    Impression      1.  There has been an interval laminectomy at L4-L5.  There is enhancing granulation tissue at the operative site.  There is diminished spinal stenosis at this level compared to the previous exam.    2.  Enhancement surrounds a synovial cyst that projects  centrally and inferiorly from the left L3-L4 facet joint and causes mild spinal stenosis.  The cyst has increased in size compared to the previous examination.    3.  There is mild to moderate spinal stenosis at L3-L4 where the thecal sac is about 50% of normal cross-sectional area, predominantly related to hypertrophy of the posterior elements.    4.  There is a far left lateral annular fissure of L3-L4 disc.    5.  Moderate to severe left-sided foraminal stenosis at L5-S1.        Electronically signed by: CHENCHO MARTIN M.D.  Date:     12/21/17  Time:    09:34      Results for orders placed during the hospital encounter of 09/03/14   X-Ray Lumbar Spine Complete 5 View    Narrative Findings:     As compared with 11/18/09, no significant interim change is identified.  Diffuse chronic changes again demonstrated as previously described.    Impression      No significant interim change appreciated as compared with 11/18/09.      Electronically signed by: SNEHA ELISE MD  Date:     09/03/14  Time:    17:10        Results for orders placed during the hospital encounter of 04/26/16   X-Ray Lumbar Spine Ap Lateral w/Flex Ext    Narrative 4 views: Alignment is normal.  There is mild-moderate DJD.  No instability seen.  No fracture dislocation bone destruction seen.    Impression  Chronic change as above.  No change from the prior 9/3/2014.  ______________________________________     Electronically signed by: AICHA HOPKINS  Date:     04/26/16  Time:    08:15      Results for orders placed in visit on 05/22/13   X-Ray Cervical Spine AP And Lateral    Narrative DATE OF EXAM: Jun 6 2013      [facility]   [id number]  -  C-SPINE 2 OR 3 VIEWS:   \  [id number]     CLINICAL HISTORY:   \723.1  CERVICALGIA     PROCEDURE COMMENT:   \     ICD 9 CODE(S):   (\)     CPT 4 CODE(S)/MODIFIER(S):   (\)     Findings:     Changes of degenerative disk disease are present from the C3-4 level   inferiorly to the C5-6 level with these most pronounced at the C5-6  "  level.  AP alignment is maintained with no acute fractures or   prevertebral soft tissue swelling identified.  Lateral masses of C1 are   well aligned.        Impression:     Multilevel degenerative disk disease appearing most pronounced at the   C5-6 level.        Electronically signed by: SNEHA ELISE MD  Date:     06/06/13  Time:    14:32            : KAILASH  Transcribe Date/Time: Jun 6 2013  2:32P  Dictated by : SNEHA ELISE MD  Read On:   \  Images were reviewed, findings were verified and document was   electronically  SIGNED BY: SNEHA ELSIE MD On: Jun 6 2013  2:32P          Review of Systems:  CONSTITUTIONAL: patient denies any fever, chills, or weight loss  SKIN: patient denies any rash or itching  RESPIRATORY: patient denies having any shortness of breath  GASTROINTESTINAL: patient denies having any diarrhea, constipation, or bowel incontinence  GENITOURINARY: patient denies having any abnormal bladder function    MUSCULOSKELETAL:  - patient complains of the above noted pain/s (see chief pain complaint)    NEUROLOGICAL:   - pain as above  - strength in Lower extremities is decreased, on the LEFT  - sensation in Lower extremities is intact, BILATERALLY  - patient denies any loss of bowel or bladder control      PSYCHIATRIC: patient denies any change in mood    Other:  All other systems reviewed and are negative      Physical Exam:  /61 (BP Location: Right arm, Patient Position: Sitting)   Pulse 71   Resp 18   Ht 6' 3" (1.905 m)   Wt 100.2 kg (221 lb)   BMI 27.62 kg/m²  (reviewed on 2/8/2018)  General: Alert and oriented, in no apparent distress.  Gait: normal gait.  Skin: No rashes, No discoloration, No obvious lesions  HEENT: Normocephalic, atraumatic. Pupils equal and round.  Cardiovascular: Regular rate and rhythm , no significant peripheral edema present  Respiratory: Without audible wheezing, without use of accessory muscles of respiration.    Musculoskeletal:    Lumbar " Spine    - Pain on flexion of lumbar spine Present  - Straight Leg Raise:  Present on Left     - Pain on extension of lumbar spine Absent  - TTP over the lumbar facet joints Absent  - Lumbar facet loading Absent    -Pain on palpation over the SI joint  Present on left  - GELY: Present on Left       Neuro:    Strength:    LE R/L: HF: 5/4, HE: 5/4, KF: 5/5; KE: 5/5; FE: 5/5; FF: 5/5    Extremity Reflexes: Brisk and symmetric throughout.      Extremity Sensory: Sensation to pinprick and temperature symmetric. Proprioception intact.      Psych:  Mood and affect is appropriate      Assessment:    Srinath Mcknight is a 77 y.o. year old male who is presenting with     Encounter Diagnoses   Name Primary?    Lumbar spondylosis Yes    Sacroiliac joint pain     Lumbar radiculopathy        Plan:    1. Interventional: Schedule patient for Left SI joint injection. Patient is scheduled for spinal surgery in March.     2. Pharmacologic: Continue Percocet 10/325 mg PO TID PRN as prescribed by Dr Alcocer. Continue Gabapentin 600 mg PO BID. Tylenol, NSAID PRN.      3. Rehabilitative: PT post injection.    4. Diagnostic: None for now.    5. Follow up: Follow-up for After Injection.      30 minutes were spent in this encounter with more than 50% of the time used for counseling and review of the plan.  Imaging / studies reviewed, detailed above.  I discussed in detail the risks, benefits, and alternatives to any and all potential treatment options.  All questions and concerns were fully addressed today in clinic. Medical decision making moderate.    Thank you for the opportunity to assist in the care of this patient.    Best wishes,    Signed:    Erik Roldan MD          Disclaimer:  This note may have been prepared using voice recognition software, it may have not been extensively proofed, as such there could be errors within the text such as sound alike errors.

## 2018-02-14 ENCOUNTER — TELEPHONE (OUTPATIENT)
Dept: INTERNAL MEDICINE | Facility: CLINIC | Age: 78
End: 2018-02-14

## 2018-02-14 NOTE — TELEPHONE ENCOUNTER
----- Message from Michelle Fletcher sent at 2/14/2018 11:45 AM CST -----  Contact: Bethanie/Dr. Collado/[facility]  Please call Bethanie at [phone] get a copy of labwork for preop

## 2018-02-14 NOTE — TELEPHONE ENCOUNTER
----- Message from Liyah Morataya sent at 2/14/2018  2:43 PM CST -----  Contact: Bethanie Ellis's office  She is calling to give you her fax number.  It is [phone].  Call back number [phone].                                               [email or web address]

## 2018-02-15 ENCOUNTER — HOSPITAL ENCOUNTER (OUTPATIENT)
Facility: HOSPITAL | Age: 78
Discharge: HOME | End: 2018-02-15
Attending: ANESTHESIOLOGY | Admitting: ANESTHESIOLOGY
Payer: MEDICARE

## 2018-02-15 ENCOUNTER — HOSPITAL ENCOUNTER (OUTPATIENT)
Dept: RADIOLOGY | Facility: HOSPITAL | Age: 78
Discharge: HOME | End: 2018-02-15
Attending: ANESTHESIOLOGY | Admitting: ANESTHESIOLOGY
Payer: MEDICARE

## 2018-02-15 VITALS
OXYGEN SATURATION: 100 % | TEMPERATURE: 98 F | DIASTOLIC BLOOD PRESSURE: 85 MMHG | BODY MASS INDEX: 27.48 KG/M2 | HEART RATE: 65 BPM | HEIGHT: 75 IN | SYSTOLIC BLOOD PRESSURE: 155 MMHG | WEIGHT: 221 LBS | RESPIRATION RATE: 16 BRPM

## 2018-02-15 DIAGNOSIS — M53.3 SACROILIAC JOINT PAIN: ICD-10-CM

## 2018-02-15 PROCEDURE — 63600175 PHARM REV CODE 636 W HCPCS

## 2018-02-15 PROCEDURE — 25000003 PHARM REV CODE 250

## 2018-02-15 PROCEDURE — 25000003 PHARM REV CODE 250: Performed by: ANESTHESIOLOGY

## 2018-02-15 PROCEDURE — 27096 INJECT SACROILIAC JOINT: CPT | Mod: TC

## 2018-02-15 PROCEDURE — 27096 INJECT SACROILIAC JOINT: CPT | Mod: LT,,, | Performed by: ANESTHESIOLOGY

## 2018-02-15 PROCEDURE — 63600175 PHARM REV CODE 636 W HCPCS: Performed by: ANESTHESIOLOGY

## 2018-02-15 RX ORDER — METHYLPREDNISOLONE ACETATE 40 MG/ML
INJECTION, SUSPENSION INTRA-ARTICULAR; INTRALESIONAL; INTRAMUSCULAR; SOFT TISSUE
Status: DISCONTINUED | OUTPATIENT
Start: 2018-02-15 | End: 2018-02-15 | Stop reason: HOSPADM

## 2018-02-15 RX ORDER — LIDOCAINE HYDROCHLORIDE 20 MG/ML
INJECTION, SOLUTION EPIDURAL; INFILTRATION; INTRACAUDAL; PERINEURAL
Status: DISCONTINUED | OUTPATIENT
Start: 2018-02-15 | End: 2018-02-15 | Stop reason: HOSPADM

## 2018-02-15 NOTE — H&P (VIEW-ONLY)
Chief Pain Complaint:  Left Lumbar Back pain with radiation down left leg    Interval History: The patient was last seen 1/24/2018. At that time he underwent Left L4/5 L5/S1 Lumbar Transforaminal Epidural Steroid Injection under Fluoroscopic Guidance. The patient reports that  he is/was unchanged following the procedure. He had no relief of pain with injection.     History of Present Illness:   Srinath Mcknight is a 77 y.o. male  who is presenting with a chief complaint of Left Lumbar Back pain with radiation down left leg. The patient began experiencing this problem insidiously, and the pain has been gradually worsening over the past 3 month(s). The pain is described as throbbing, shooting, burning and electrical and is located in the left lumbar spine. Pain is intermittent and lasts hours. The pain radiates to  left leg anterior thigh and shin. The patient rates his pain a 8 out of ten and interferes with activities of daily living a 7 out of ten. Pain is exacerbated by flexion of the lumbar spine, and is improved by rest. Patient reports no prior trauma,  prior spinal surgery Laminectomy at L4-L5, prior hip surgery Left total hip replacement.     - pertinent negatives: No fever, No chills, No weight loss, No bladder dysfunction, No bowel dysfunction, No saddle anesthesia  - pertinent positives: left leg weakness    - medications, other therapies tried (physical therapy, injections):     >> NSAIDs, Tylenol, Percocet and oxycodone    >> Has previously undergone Physical Therapy    >> Has NOT previously undergone spinal injection/s      Imaging / Labs / Studies (reviewed on 2/8/2018):    Results for orders placed during the hospital encounter of 05/02/16   MRI Lumbar Spine Without Contrast    Narrative MRI OF THE LUMBAR SPINE WITHOUT CONTRAST.     Technique:  Sagittal T1, sagittal T2, sagittal STIR, axial T1 and axial T2 weighted images of the lumbar spine obtained without contrast.     Comparison: MRI Lumbar spine  without contrast from 11/12/14     Findings:  The there is minimal (grade I) the retrolisthesis of L5 on S1.  Lumbar spinal alignment is otherwise within normal limits.  There is no evidence of acute fracture.  Vertebral body heights are maintained.  No abnormal marrow signals identified to suggest an infiltrative process.  The conus is normal in appearance and terminates at the L1 level.    There is intervertebral disc space narrowing and desiccation of discs within the lower lumbar spine.  There findings of multilevel lumbar spondylosis as detailed below.    L1-L2: No significant central canal stenosis or neuroforaminal narrowing.    L2-L3: There is a mild broad-based disc bulge without significant central canal stenosis or neuroforaminal narrowing.     L3-L4: There is a broad-based disc bulge and bilateral facet arthropathy with associated synovial cyst arising from the left facet joint measuring approximately 5 x 9 mm, similar to the prior examination.  This results in moderate central canal stenosis, slightly progressed from the prior examination.  There is mild bilateral neuroforaminal narrowing.     L4-L5: There is a broad-based disc bulge and prominent bilateral facet arthropathy resulting in severe central canal stenosis and mild bilateral neuroforaminal narrowing.     L5-S1: There is a broad-based disc bulge and a bilateral facet arthropathy resulting in mild bilateral neuroforaminal narrowing.  No significant central canal stenosis.     The visualized intra-abdominal contents demonstrate no significant abnormalities.  The bilateral SI joints are unremarkable.  The paraspinal musculature is within normal limits.    Impression      1.  Lumbar spondylosis resulting in moderate central canal stenosis at L3-L4 and severe central canal stenosis at L4-L5.  Mild neuroforaminal narrowing noted at L3-L4 through L5-S1.  Findings are slightly progressed from the prior examination from 11/12/14.    2.  Prominent facet  arthropathy noted within the lower lumbar spine with a grossly stable appearing synovial cyst arising from the left facet joint at L3-L4.    ______________________________________     Electronically signed by resident: Eduar Perdomo  Date:     05/02/16  Time:    10:40       ______________________________________     Electronically signed by: Abel Nieves MD  Date:     05/02/16  Time:    10:49      Results for orders placed during the hospital encounter of 12/20/17   MRI Lumbar Spine W WO Contrast    Narrative EXAM: [id number] LUMBAR SPINE W WO CONTRAST    CLINICAL HISTORY: Left sided sciatic pain.  Aching and shooting pain 9-10 severity.    TECHNIQUE: MR Lumbar spine with contrast. Sagittal T1, T2, STIR. Axial T1, T2. Post contrast Sagittal and Axial T1.    COMPARISON: Previous MRI of the lumbar spine from 05/02/2016.    FINDINGS:    Since the previous study there has been a laminectomy at L4-L5. There is enhancement of granulation tissue at the operative site.  No abnormal enhancement surrounds the exiting nerve roots.  The thecal sac measures 9 mm AP at this level.  There is slight mass effect upon the lateral recess without definite compression of the descending L5 nerve roots. There is a synovial cyst with peripheral enhancement projecting centrally and inferiorly from the left facet joint at L3-L4 that demonstrates peripheral enhancement.  It causes mild spinal stenosis at the level of L4 and is slightly larger than on the previous study from May 2016. The conus medullaris terminates at the level ofL1-L2. No abnormal signal within the conus. Intervertebral disc levels are as follows:    T12-L1 disc: No significant disc pathology. No spinal canal or neural foraminal stenosis.    L1-L2 disc : No significant disc pathology. No spinal canal or neural foraminal stenosis.    L2-L3 disc: Normal disc height with mild degenerative facet changes and thickening of ligamentum flavum.  No significant canal or foraminal  stenosis.    L3-L4 disc: Partial distal desiccation with a broad-based posterior disc bulge.  There is a far left lateral annular fissure of the disc.  Moderate to severe degenerative facet change with bilateral facet joint effusions.  The thecal sac measures 10 mm AP but is about 50% of normal cross-sectional area.  Disc material bulges into the floor of the left exit foramen and causes mild to moderate left foraminal stenosis.  There is minimal right foraminal stenosis.  Additionally, there is a synovial cyst projects centrally and inferiorly from the left facet joint.  The synovial cyst demonstrates peripheral enhancement and measures 11 mm craniocaudal by 11 mm transverse by 5 mm AP.  It is best demonstrated on axial T2 series 6 image 23.  It causes mild thecal sac stenosis without definite neural compression.  There is a smaller synovial cyst projecting toward the left as well, remote from any neural structures.    L4-L5 disc: Level of interval laminectomy.  There is enhancing granulation tissue at the operative site without enhancement surrounding the exiting nerve roots.  The thecal sac measures 9 mm AP.  There is mild mass effect upon the lateral recesses without definite compression no descending L5 nerve roots.  Minimal bilateral foraminal stenosis.    L5-S1 disc: Partial does desiccation and disc space height loss.  Height loss is most severe toward the left where there are marginal osteophytes.  Osteophyte material encroaches into the floor of the left exit foramen causing moderate to severe left foraminal stenosis.  The right neural foramen is minimally narrowed.  Moderate degenerative facet change.  The thecal sac measures 12 mm AP.    Impression      1.  There has been an interval laminectomy at L4-L5.  There is enhancing granulation tissue at the operative site.  There is diminished spinal stenosis at this level compared to the previous exam.    2.  Enhancement surrounds a synovial cyst that projects  centrally and inferiorly from the left L3-L4 facet joint and causes mild spinal stenosis.  The cyst has increased in size compared to the previous examination.    3.  There is mild to moderate spinal stenosis at L3-L4 where the thecal sac is about 50% of normal cross-sectional area, predominantly related to hypertrophy of the posterior elements.    4.  There is a far left lateral annular fissure of L3-L4 disc.    5.  Moderate to severe left-sided foraminal stenosis at L5-S1.        Electronically signed by: CHENCHO MARTIN M.D.  Date:     12/21/17  Time:    09:34      Results for orders placed during the hospital encounter of 09/03/14   X-Ray Lumbar Spine Complete 5 View    Narrative Findings:     As compared with 11/18/09, no significant interim change is identified.  Diffuse chronic changes again demonstrated as previously described.    Impression      No significant interim change appreciated as compared with 11/18/09.      Electronically signed by: SNEHA ELISE MD  Date:     09/03/14  Time:    17:10        Results for orders placed during the hospital encounter of 04/26/16   X-Ray Lumbar Spine Ap Lateral w/Flex Ext    Narrative 4 views: Alignment is normal.  There is mild-moderate DJD.  No instability seen.  No fracture dislocation bone destruction seen.    Impression  Chronic change as above.  No change from the prior 9/3/2014.  ______________________________________     Electronically signed by: AICHA HOPKINS  Date:     04/26/16  Time:    08:15      Results for orders placed in visit on 05/22/13   X-Ray Cervical Spine AP And Lateral    Narrative DATE OF EXAM: Jun 6 2013      [facility]   [id number]  -  C-SPINE 2 OR 3 VIEWS:   \  [id number]     CLINICAL HISTORY:   \723.1  CERVICALGIA     PROCEDURE COMMENT:   \     ICD 9 CODE(S):   (\)     CPT 4 CODE(S)/MODIFIER(S):   (\)     Findings:     Changes of degenerative disk disease are present from the C3-4 level   inferiorly to the C5-6 level with these most pronounced at the C5-6  "  level.  AP alignment is maintained with no acute fractures or   prevertebral soft tissue swelling identified.  Lateral masses of C1 are   well aligned.        Impression:     Multilevel degenerative disk disease appearing most pronounced at the   C5-6 level.        Electronically signed by: SNEHA ELISE MD  Date:     06/06/13  Time:    14:32            : KAILASH  Transcribe Date/Time: Jun 6 2013  2:32P  Dictated by : SNEHA ELISE MD  Read On:   \  Images were reviewed, findings were verified and document was   electronically  SIGNED BY: SNEHA ELISE MD On: Jun 6 2013  2:32P          Review of Systems:  CONSTITUTIONAL: patient denies any fever, chills, or weight loss  SKIN: patient denies any rash or itching  RESPIRATORY: patient denies having any shortness of breath  GASTROINTESTINAL: patient denies having any diarrhea, constipation, or bowel incontinence  GENITOURINARY: patient denies having any abnormal bladder function    MUSCULOSKELETAL:  - patient complains of the above noted pain/s (see chief pain complaint)    NEUROLOGICAL:   - pain as above  - strength in Lower extremities is decreased, on the LEFT  - sensation in Lower extremities is intact, BILATERALLY  - patient denies any loss of bowel or bladder control      PSYCHIATRIC: patient denies any change in mood    Other:  All other systems reviewed and are negative      Physical Exam:  /61 (BP Location: Right arm, Patient Position: Sitting)   Pulse 71   Resp 18   Ht 6' 3" (1.905 m)   Wt 100.2 kg (221 lb)   BMI 27.62 kg/m²  (reviewed on 2/8/2018)  General: Alert and oriented, in no apparent distress.  Gait: normal gait.  Skin: No rashes, No discoloration, No obvious lesions  HEENT: Normocephalic, atraumatic. Pupils equal and round.  Cardiovascular: Regular rate and rhythm , no significant peripheral edema present  Respiratory: Without audible wheezing, without use of accessory muscles of respiration.    Musculoskeletal:    Lumbar " Spine    - Pain on flexion of lumbar spine Present  - Straight Leg Raise:  Present on Left     - Pain on extension of lumbar spine Absent  - TTP over the lumbar facet joints Absent  - Lumbar facet loading Absent    -Pain on palpation over the SI joint  Present on left  - GELY: Present on Left       Neuro:    Strength:    LE R/L: HF: 5/4, HE: 5/4, KF: 5/5; KE: 5/5; FE: 5/5; FF: 5/5    Extremity Reflexes: Brisk and symmetric throughout.      Extremity Sensory: Sensation to pinprick and temperature symmetric. Proprioception intact.      Psych:  Mood and affect is appropriate      Assessment:    Srinath Mcknight is a 77 y.o. year old male who is presenting with     Encounter Diagnoses   Name Primary?    Lumbar spondylosis Yes    Sacroiliac joint pain     Lumbar radiculopathy        Plan:    1. Interventional: Schedule patient for Left SI joint injection. Patient is scheduled for spinal surgery in March.     2. Pharmacologic: Continue Percocet 10/325 mg PO TID PRN as prescribed by Dr Alcocer. Continue Gabapentin 600 mg PO BID. Tylenol, NSAID PRN.      3. Rehabilitative: PT post injection.    4. Diagnostic: None for now.    5. Follow up: Follow-up for After Injection.      30 minutes were spent in this encounter with more than 50% of the time used for counseling and review of the plan.  Imaging / studies reviewed, detailed above.  I discussed in detail the risks, benefits, and alternatives to any and all potential treatment options.  All questions and concerns were fully addressed today in clinic. Medical decision making moderate.    Thank you for the opportunity to assist in the care of this patient.    Best wishes,    Signed:    Erik Roldan MD          Disclaimer:  This note may have been prepared using voice recognition software, it may have not been extensively proofed, as such there could be errors within the text such as sound alike errors.

## 2018-02-15 NOTE — PROCEDURES
PROCEDURE: Sacroiliac joint injection under fluoroscopic guidance     SIDE: left      PROCEDURE DATE: 2/15/2018    PREOPERATIVE DIAGNOSIS: Sacroiliitis  POSTOPERATIVE DIAGNOSIS: Sacroiliitis    PROVIDER: Erik Roldan MD  Assistant(s): None    ANESTHESIA: Local, No Sedation    >> 0 mg of VERSED    >> 0 mcg of FENTANYL     INDICATION: The patient has a history of pain due to sacroiliitis unresponsive to conservative treatments. Fluoroscopy was used to optimize visualization of needle placement and to maximize safety.     PROCEDURE DESCRIPTION: The patient was seen and identified in the preoperative area. Risks, benefits, complications, and alternatives were discussed with the patient. The patient agreed to proceed with the procedure and signed the consent. The site and side of the procedure was identified and marked. An IV was not placed for this procedure.     The patient was taken to the procedural suite. The patient was positioned in prone orientation on procedure table. A time out was performed prior to any intervention. The procedure, site, side, and allergies were stated and agreed to by all present. The lumbosacral area was widely prepped with ChloraPrep. The procedural site was draped in usual sterile fashion. Vital signs were closely monitored throughout this procedure. Conscious sedation was not used for this procedure.    The fluoroscopic camera was placed in contralateral oblique view and was adjusted until the anterior and posterior joint margins of the targeted sacroiliac joint aligned in linear array. The lower pole of the joint was identified, marked, and localized with 1% Lidocaine. A 25 gauge 3.5 inch spinal needle was introduced and advanced to the joint under fluoroscopic guidance. The joint space was entered and after negative aspiration, 2 mL of injectate was posited into the joint space. The needle was then withdrawn outside of the joint space and after negative aspiration 1 mL of solution was  injected outside of the joint space. The injectant solution used was comprised of 2 mL of 1% PF Lidocaine and 1 mL of Methylprednisolone (40 mg/mL). This techniques was performed for the above noted joint/s.    Description of Findings: Not applicable    Prosthetic devices, grafts, tissues, or devices implanted: None    Specimen Removed: No    Estimated Blood Loss: minimal    COMPLICATIONS: None    DISPOSITION / PLANS: The patient was transferred to the recovery area in a stable condition for observation. The patient was reexamined prior to discharge. There was no evidence of acute neurologic injury following the procedure.  Patient was discharged from the recovery room after meeting discharge criteria. Home discharge instructions were given to the patient by the staff.

## 2018-02-15 NOTE — DISCHARGE SUMMARY
[facility]  Discharge Note       Description of Procedure: Left Sacroiliac Joint Injection under Fluoroscopic Guidance    Procedure Date: 2/15/2018    Admit Date: 2/15/2018  Discharge Date: 2/15/2018     Attending Physician: Jamar Valencia   Discharge Provider: Jamar Valencia    Preoperative Diagnosis: Sacroiliac Joint Dysfunction     Postoperative Diagnosis: as above, same as preoperative diagnosis    Discharged Condition: Stable    Hospital Course: Patient was admitted for an outpatient procedure. The procedure was tolerated well with no complications.    Final Diagnoses: Same as principal problem.    Disposition: Home, self-care.    Follow up/Patient Instructions:  Follow-up in clinic in 2-3 weeks.    Medications: No medications were prescribed today. The patient was advised to resume normal medication regimen without change.  Specific information was provided regarding restarting any anticoagulant/s.    Discharge Procedure Orders (must include Diet, Follow-up, Activity):  Light activity for the remainder of the day, resume normal activity tomorrow. Resume normal diet. Follow-up in clinic in 2-3 weeks.

## 2018-02-15 NOTE — OP NOTE
PROCEDURE: Sacroiliac joint injection under fluoroscopic guidance     SIDE: left      PROCEDURE DATE: 2/15/2018     PREOPERATIVE DIAGNOSIS: Sacroiliitis  POSTOPERATIVE DIAGNOSIS: Sacroiliitis     PROVIDER: Erik Roldan MD  Assistant(s): None     ANESTHESIA: Local, No Sedation    >> 0 mg of VERSED    >> 0 mcg of FENTANYL      INDICATION: The patient has a history of pain due to sacroiliitis unresponsive to conservative treatments. Fluoroscopy was used to optimize visualization of needle placement and to maximize safety.      PROCEDURE DESCRIPTION: The patient was seen and identified in the preoperative area. Risks, benefits, complications, and alternatives were discussed with the patient. The patient agreed to proceed with the procedure and signed the consent. The site and side of the procedure was identified and marked. An IV was not placed for this procedure.     The patient was taken to the procedural suite. The patient was positioned in prone orientation on procedure table. A time out was performed prior to any intervention. The procedure, site, side, and allergies were stated and agreed to by all present. The lumbosacral area was widely prepped with ChloraPrep. The procedural site was draped in usual sterile fashion. Vital signs were closely monitored throughout this procedure. Conscious sedation was not used for this procedure.     The fluoroscopic camera was placed in contralateral oblique view and was adjusted until the anterior and posterior joint margins of the targeted sacroiliac joint aligned in linear array. The lower pole of the joint was identified, marked, and localized with 1% Lidocaine. A 25 gauge 3.5 inch spinal needle was introduced and advanced to the joint under fluoroscopic guidance. The joint space was entered and after negative aspiration, 2 mL of injectate was posited into the joint space. The needle was then withdrawn outside of the joint space and after negative aspiration 1 mL of solution  was injected outside of the joint space. The injectant solution used was comprised of 2 mL of 1% PF Lidocaine and 1 mL of Methylprednisolone (40 mg/mL). This techniques was performed for the above noted joint/s.     Description of Findings: Not applicable     Prosthetic devices, grafts, tissues, or devices implanted: None     Specimen Removed: No     Estimated Blood Loss: minimal     COMPLICATIONS: None     DISPOSITION / PLANS: The patient was transferred to the recovery area in a stable condition for observation. The patient was reexamined prior to discharge. There was no evidence of acute neurologic injury following the procedure.  Patient was discharged from the recovery room after meeting discharge criteria. Home discharge

## 2018-02-15 NOTE — PLAN OF CARE
Problem: Patient Care Overview  Goal: Plan of Care Review  Outcome: Outcome(s) achieved Date Met: 02/15/18  Patient d/c home in stable condition via wheelchair with ride. Verbalized understanding of d/c instructions. Patient voiced no complaints at this time. Patient stood at side of bed, walked steps with no new motor deficits. Neurologically intact.

## 2018-03-12 DIAGNOSIS — G47.50 PARASOMNIA: ICD-10-CM

## 2018-03-12 RX ORDER — CLONAZEPAM 0.25 MG/1
TABLET, ORALLY DISINTEGRATING ORAL
Qty: 60 TABLET | Refills: 0 | Status: SHIPPED | OUTPATIENT
Start: 2018-03-12 | End: 2018-09-06 | Stop reason: SDUPTHER

## 2018-03-14 DIAGNOSIS — G47.50 PARASOMNIA: ICD-10-CM

## 2018-03-14 RX ORDER — CLONAZEPAM 0.25 MG/1
TABLET, ORALLY DISINTEGRATING ORAL
Qty: 60 TABLET | Refills: 0 | OUTPATIENT
Start: 2018-03-14

## 2018-03-20 ENCOUNTER — OFFICE VISIT (OUTPATIENT)
Dept: PAIN MEDICINE | Facility: CLINIC | Age: 78
End: 2018-03-20
Payer: MEDICARE

## 2018-03-20 VITALS
RESPIRATION RATE: 16 BRPM | DIASTOLIC BLOOD PRESSURE: 67 MMHG | WEIGHT: 221 LBS | SYSTOLIC BLOOD PRESSURE: 118 MMHG | HEIGHT: 75 IN | BODY MASS INDEX: 27.48 KG/M2 | HEART RATE: 70 BPM

## 2018-03-20 DIAGNOSIS — M54.16 LUMBAR RADICULOPATHY: ICD-10-CM

## 2018-03-20 DIAGNOSIS — M53.3 SACROILIAC JOINT PAIN: ICD-10-CM

## 2018-03-20 DIAGNOSIS — M47.816 LUMBAR SPONDYLOSIS: Primary | ICD-10-CM

## 2018-03-20 DIAGNOSIS — G47.50 PARASOMNIA: ICD-10-CM

## 2018-03-20 PROCEDURE — 3074F SYST BP LT 130 MM HG: CPT | Mod: CPTII,S$GLB,, | Performed by: ANESTHESIOLOGY

## 2018-03-20 PROCEDURE — 99214 OFFICE O/P EST MOD 30 MIN: CPT | Mod: S$GLB,,, | Performed by: ANESTHESIOLOGY

## 2018-03-20 PROCEDURE — 3078F DIAST BP <80 MM HG: CPT | Mod: CPTII,S$GLB,, | Performed by: ANESTHESIOLOGY

## 2018-03-20 PROCEDURE — 99999 PR PBB SHADOW E&M-EST. PATIENT-LVL III: CPT | Mod: PBBFAC,,, | Performed by: ANESTHESIOLOGY

## 2018-03-20 RX ORDER — TIZANIDINE 4 MG/1
4 TABLET ORAL EVERY 6 HOURS PRN
Qty: 60 TABLET | Refills: 0 | Status: SHIPPED | OUTPATIENT
Start: 2018-03-20 | End: 2018-03-20 | Stop reason: SDUPTHER

## 2018-03-20 RX ORDER — GABAPENTIN 300 MG/1
CAPSULE ORAL
Qty: 90 CAPSULE | Refills: 0 | Status: SHIPPED | OUTPATIENT
Start: 2018-03-20 | End: 2018-04-19

## 2018-03-20 RX ORDER — CLONAZEPAM 0.25 MG/1
TABLET, ORALLY DISINTEGRATING ORAL
Qty: 90 TABLET | Refills: 0 | OUTPATIENT
Start: 2018-03-20

## 2018-03-20 NOTE — PROGRESS NOTES
Chief Pain Complaint:  Left Lumbar Back pain with radiation down left leg    Interval History: The patient was last seen 2/15/2018. At that time he underwent Left Sacroiliac Joint Injection under Fluoroscopic Guidance. The patient reports that  he is/was unchanged following the procedure. Patient is s/p L4-5 Microdiscectomy and cyst removal with Dr Iqbal on March 6th with significant improvement in pain.       Interval History: The patient was last seen 1/24/2018. At that time he underwent Left L4/5 L5/S1 Lumbar Transforaminal Epidural Steroid Injection under Fluoroscopic Guidance. The patient reports that  he is/was unchanged following the procedure. He had no relief of pain with injection.     History of Present Illness:   Srinath Mcknight is a 77 y.o. male  who is presenting with a chief complaint of Left Lumbar Back pain with radiation down left leg. The patient began experiencing this problem insidiously, and the pain has been gradually worsening over the past 3 month(s). The pain is described as throbbing, shooting, burning and electrical and is located in the left lumbar spine. Pain is intermittent and lasts hours. The pain radiates to  left leg anterior thigh and shin. The patient rates his pain a 8 out of ten and interferes with activities of daily living a 7 out of ten. Pain is exacerbated by flexion of the lumbar spine, and is improved by rest. Patient reports no prior trauma,  prior spinal surgery Laminectomy at L4-L5, prior hip surgery Left total hip replacement.     - pertinent negatives: No fever, No chills, No weight loss, No bladder dysfunction, No bowel dysfunction, No saddle anesthesia  - pertinent positives: left leg weakness    - medications, other therapies tried (physical therapy, injections):     >> NSAIDs, Tylenol, Percocet and oxycodone    >> Has previously undergone Physical Therapy    >> Has NOT previously undergone spinal injection/s      Imaging / Labs / Studies (reviewed on  3/20/2018):    Results for orders placed during the hospital encounter of 05/02/16   MRI Lumbar Spine Without Contrast    Narrative MRI OF THE LUMBAR SPINE WITHOUT CONTRAST.     Technique:  Sagittal T1, sagittal T2, sagittal STIR, axial T1 and axial T2 weighted images of the lumbar spine obtained without contrast.     Comparison: MRI Lumbar spine without contrast from 11/12/14     Findings:  The there is minimal (grade I) the retrolisthesis of L5 on S1.  Lumbar spinal alignment is otherwise within normal limits.  There is no evidence of acute fracture.  Vertebral body heights are maintained.  No abnormal marrow signals identified to suggest an infiltrative process.  The conus is normal in appearance and terminates at the L1 level.    There is intervertebral disc space narrowing and desiccation of discs within the lower lumbar spine.  There findings of multilevel lumbar spondylosis as detailed below.    L1-L2: No significant central canal stenosis or neuroforaminal narrowing.    L2-L3: There is a mild broad-based disc bulge without significant central canal stenosis or neuroforaminal narrowing.     L3-L4: There is a broad-based disc bulge and bilateral facet arthropathy with associated synovial cyst arising from the left facet joint measuring approximately 5 x 9 mm, similar to the prior examination.  This results in moderate central canal stenosis, slightly progressed from the prior examination.  There is mild bilateral neuroforaminal narrowing.     L4-L5: There is a broad-based disc bulge and prominent bilateral facet arthropathy resulting in severe central canal stenosis and mild bilateral neuroforaminal narrowing.     L5-S1: There is a broad-based disc bulge and a bilateral facet arthropathy resulting in mild bilateral neuroforaminal narrowing.  No significant central canal stenosis.     The visualized intra-abdominal contents demonstrate no significant abnormalities.  The bilateral SI joints are unremarkable.   The paraspinal musculature is within normal limits.    Impression      1.  Lumbar spondylosis resulting in moderate central canal stenosis at L3-L4 and severe central canal stenosis at L4-L5.  Mild neuroforaminal narrowing noted at L3-L4 through L5-S1.  Findings are slightly progressed from the prior examination from 11/12/14.    2.  Prominent facet arthropathy noted within the lower lumbar spine with a grossly stable appearing synovial cyst arising from the left facet joint at L3-L4.    ______________________________________     Electronically signed by resident: Eduar Perdomo  Date:     05/02/16  Time:    10:40       ______________________________________     Electronically signed by: Abel Nieves MD  Date:     05/02/16  Time:    10:49      Results for orders placed during the hospital encounter of 12/20/17   MRI Lumbar Spine W WO Contrast    Narrative EXAM: [id number] LUMBAR SPINE W WO CONTRAST    CLINICAL HISTORY: Left sided sciatic pain.  Aching and shooting pain 9-10 severity.    TECHNIQUE: MR Lumbar spine with contrast. Sagittal T1, T2, STIR. Axial T1, T2. Post contrast Sagittal and Axial T1.    COMPARISON: Previous MRI of the lumbar spine from 05/02/2016.    FINDINGS:    Since the previous study there has been a laminectomy at L4-L5. There is enhancement of granulation tissue at the operative site.  No abnormal enhancement surrounds the exiting nerve roots.  The thecal sac measures 9 mm AP at this level.  There is slight mass effect upon the lateral recess without definite compression of the descending L5 nerve roots. There is a synovial cyst with peripheral enhancement projecting centrally and inferiorly from the left facet joint at L3-L4 that demonstrates peripheral enhancement.  It causes mild spinal stenosis at the level of L4 and is slightly larger than on the previous study from May 2016. The conus medullaris terminates at the level ofL1-L2. No abnormal signal within the conus. Intervertebral disc levels  are as follows:    T12-L1 disc: No significant disc pathology. No spinal canal or neural foraminal stenosis.    L1-L2 disc : No significant disc pathology. No spinal canal or neural foraminal stenosis.    L2-L3 disc: Normal disc height with mild degenerative facet changes and thickening of ligamentum flavum.  No significant canal or foraminal stenosis.    L3-L4 disc: Partial distal desiccation with a broad-based posterior disc bulge.  There is a far left lateral annular fissure of the disc.  Moderate to severe degenerative facet change with bilateral facet joint effusions.  The thecal sac measures 10 mm AP but is about 50% of normal cross-sectional area.  Disc material bulges into the floor of the left exit foramen and causes mild to moderate left foraminal stenosis.  There is minimal right foraminal stenosis.  Additionally, there is a synovial cyst projects centrally and inferiorly from the left facet joint.  The synovial cyst demonstrates peripheral enhancement and measures 11 mm craniocaudal by 11 mm transverse by 5 mm AP.  It is best demonstrated on axial T2 series 6 image 23.  It causes mild thecal sac stenosis without definite neural compression.  There is a smaller synovial cyst projecting toward the left as well, remote from any neural structures.    L4-L5 disc: Level of interval laminectomy.  There is enhancing granulation tissue at the operative site without enhancement surrounding the exiting nerve roots.  The thecal sac measures 9 mm AP.  There is mild mass effect upon the lateral recesses without definite compression no descending L5 nerve roots.  Minimal bilateral foraminal stenosis.    L5-S1 disc: Partial does desiccation and disc space height loss.  Height loss is most severe toward the left where there are marginal osteophytes.  Osteophyte material encroaches into the floor of the left exit foramen causing moderate to severe left foraminal stenosis.  The right neural foramen is minimally narrowed.   Moderate degenerative facet change.  The thecal sac measures 12 mm AP.    Impression      1.  There has been an interval laminectomy at L4-L5.  There is enhancing granulation tissue at the operative site.  There is diminished spinal stenosis at this level compared to the previous exam.    2.  Enhancement surrounds a synovial cyst that projects centrally and inferiorly from the left L3-L4 facet joint and causes mild spinal stenosis.  The cyst has increased in size compared to the previous examination.    3.  There is mild to moderate spinal stenosis at L3-L4 where the thecal sac is about 50% of normal cross-sectional area, predominantly related to hypertrophy of the posterior elements.    4.  There is a far left lateral annular fissure of L3-L4 disc.    5.  Moderate to severe left-sided foraminal stenosis at L5-S1.        Electronically signed by: CHENCHO MARTIN M.D.  Date:     12/21/17  Time:    09:34      Results for orders placed during the hospital encounter of 09/03/14   X-Ray Lumbar Spine Complete 5 View    Narrative Findings:     As compared with 11/18/09, no significant interim change is identified.  Diffuse chronic changes again demonstrated as previously described.    Impression      No significant interim change appreciated as compared with 11/18/09.      Electronically signed by: SNEHA ELISE MD  Date:     09/03/14  Time:    17:10        Results for orders placed during the hospital encounter of 04/26/16   X-Ray Lumbar Spine Ap Lateral w/Flex Ext    Narrative 4 views: Alignment is normal.  There is mild-moderate DJD.  No instability seen.  No fracture dislocation bone destruction seen.    Impression  Chronic change as above.  No change from the prior 9/3/2014.  ______________________________________     Electronically signed by: AICHA HOPKINS  Date:     04/26/16  Time:    08:15      Results for orders placed in visit on 05/22/13   X-Ray Cervical Spine AP And Lateral    Narrative DATE OF EXAM: Jun 6 2013     "  [facility]   0133  -  C-SPINE 2 OR 3 VIEWS:   \  [id number]     CLINICAL HISTORY:   \723.1  CERVICALGIA     PROCEDURE COMMENT:   \     ICD 9 CODE(S):   (\)     CPT 4 CODE(S)/MODIFIER(S):   (\)     Findings:     Changes of degenerative disk disease are present from the C3-4 level   inferiorly to the C5-6 level with these most pronounced at the C5-6   level.  AP alignment is maintained with no acute fractures or   prevertebral soft tissue swelling identified.  Lateral masses of C1 are   well aligned.        Impression:     Multilevel degenerative disk disease appearing most pronounced at the   C5-6 level.        Electronically signed by: SNEHA ELISE MD  Date:     06/06/13  Time:    14:32            : KAILASH  Transcribe Date/Time: Jun 6 2013  2:32P  Dictated by : SNEHA ELISE MD  Read On:   \  Images were reviewed, findings were verified and document was   electronically  SIGNED BY: SNEHA ELISE MD On: Jun 6 2013  2:32P          Review of Systems:  CONSTITUTIONAL: patient denies any fever, chills, or weight loss  SKIN: patient denies any rash or itching  RESPIRATORY: patient denies having any shortness of breath  GASTROINTESTINAL: patient denies having any diarrhea, constipation, or bowel incontinence  GENITOURINARY: patient denies having any abnormal bladder function    MUSCULOSKELETAL:  - patient complains of the above noted pain/s (see chief pain complaint)    NEUROLOGICAL:   - pain as above  - strength in Lower extremities is decreased, on the LEFT  - sensation in Lower extremities is intact, BILATERALLY  - patient denies any loss of bowel or bladder control      PSYCHIATRIC: patient denies any change in mood    Other:  All other systems reviewed and are negative      Physical Exam:  /67 (BP Location: Right arm, Patient Position: Sitting, BP Method: Medium (Automatic))   Pulse 70   Resp 16   Ht 6' 3" (1.905 m)   Wt 100.2 kg (221 lb)   BMI 27.62 kg/m²  (reviewed on 3/20/2018)  General: Alert and " oriented, in no apparent distress.  Gait: normal gait.  Skin: No rashes, No discoloration, No obvious lesions  HEENT: Normocephalic, atraumatic. Pupils equal and round.  Cardiovascular: Regular rate and rhythm , no significant peripheral edema present  Respiratory: Without audible wheezing, without use of accessory muscles of respiration.    Musculoskeletal:    Lumbar Spine    - Pain on flexion of lumbar spine Present  - Straight Leg Raise:  Present on Left     - Pain on extension of lumbar spine Absent  - TTP over the lumbar facet joints Absent  - Lumbar facet loading Absent    -Pain on palpation over the SI joint  Present on left  - GELY: Present on Left       Neuro:    Strength:    LE R/L: HF: 5/4, HE: 5/4, KF: 5/5; KE: 5/5; FE: 5/5; FF: 5/5    Extremity Reflexes: Brisk and symmetric throughout.      Extremity Sensory: Sensation to pinprick and temperature symmetric. Proprioception intact.      Psych:  Mood and affect is appropriate      Assessment:    Srinath Mcknight is a 77 y.o. year old male who is presenting with     Encounter Diagnoses   Name Primary?    Lumbar spondylosis Yes    Lumbar radiculopathy     Sacroiliac joint pain        Plan:    1. Interventional: None. Patient is s/p L4-5 Microdiscectomy and cyst removal with Dr Iqbal on March 6th with significant improvement in pain.     2. Pharmacologic: Continue Percocet 10/325 mg PO TID PRN as prescribed by Dr Alcocer. Continue Gabapentin 300 mg PO Q hs with up titiration. Tizanidine 4 mg PO BID PRN. Tylenol, NSAID PRN.      3. Rehabilitative: PT after clearance form Neurosurgeon.     4. Diagnostic: None for now.    5. Follow up: Follow-up in about 4 weeks (around 4/17/2018).      30 minutes were spent in this encounter with more than 50% of the time used for counseling and review of the plan.  Imaging / studies reviewed, detailed above.  I discussed in detail the risks, benefits, and alternatives to any and all potential treatment options.  All  questions and concerns were fully addressed today in clinic. Medical decision making moderate.    Thank you for the opportunity to assist in the care of this patient.    Best wishes,    Signed:    Erik Roldan MD          Disclaimer:  This note may have been prepared using voice recognition software, it may have not been extensively proofed, as such there could be errors within the text such as sound alike errors.

## 2018-03-21 RX ORDER — TIZANIDINE 4 MG/1
TABLET ORAL
Qty: 360 TABLET | Refills: 0 | Status: SHIPPED | OUTPATIENT
Start: 2018-03-21 | End: 2018-04-19 | Stop reason: ALTCHOICE

## 2018-03-29 ENCOUNTER — OFFICE VISIT (OUTPATIENT)
Dept: URGENT CARE | Facility: CLINIC | Age: 78
End: 2018-03-29
Payer: MEDICARE

## 2018-03-29 VITALS
OXYGEN SATURATION: 100 % | DIASTOLIC BLOOD PRESSURE: 80 MMHG | SYSTOLIC BLOOD PRESSURE: 120 MMHG | BODY MASS INDEX: 25.96 KG/M2 | WEIGHT: 207.69 LBS | HEART RATE: 87 BPM | TEMPERATURE: 97 F

## 2018-03-29 DIAGNOSIS — M10.9 ACUTE GOUT INVOLVING TOE OF RIGHT FOOT, UNSPECIFIED CAUSE: Primary | ICD-10-CM

## 2018-03-29 PROCEDURE — 99214 OFFICE O/P EST MOD 30 MIN: CPT | Mod: S$GLB,,, | Performed by: NURSE PRACTITIONER

## 2018-03-29 PROCEDURE — 3074F SYST BP LT 130 MM HG: CPT | Mod: CPTII,S$GLB,, | Performed by: NURSE PRACTITIONER

## 2018-03-29 PROCEDURE — 99999 PR PBB SHADOW E&M-EST. PATIENT-LVL V: CPT | Mod: PBBFAC,,, | Performed by: NURSE PRACTITIONER

## 2018-03-29 PROCEDURE — 3079F DIAST BP 80-89 MM HG: CPT | Mod: CPTII,S$GLB,, | Performed by: NURSE PRACTITIONER

## 2018-03-29 RX ORDER — COLCHICINE 0.6 MG/1
TABLET ORAL
Qty: 6 TABLET | Refills: 0 | Status: SHIPPED | OUTPATIENT
Start: 2018-03-29 | End: 2018-10-09

## 2018-03-29 NOTE — PROGRESS NOTES
Subjective:       Patient ID: Srinath Mcknight is a 77 y.o. male.    Chief Complaint: Foot Pain    Foot Pain   This is a new problem. The current episode started yesterday. The problem occurs constantly. The problem has been rapidly worsening. Associated symptoms include arthralgias and joint swelling. Pertinent negatives include no abdominal pain, anorexia, change in bowel habit, chest pain, chills, congestion, coughing, diaphoresis, fatigue, fever, headaches, myalgias, nausea, neck pain, numbness, rash, sore throat, swollen glands, urinary symptoms, vertigo, visual change, vomiting or weakness. The symptoms are aggravated by walking and bending. He has tried rest and acetaminophen for the symptoms. The treatment provided mild relief.       /80 (BP Location: Left arm, Patient Position: Sitting, BP Method: Large (Manual))   Pulse 87   Temp 97.1 °F (36.2 °C) (Tympanic)   Wt 94.2 kg (207 lb 10.8 oz)   SpO2 100%   BMI 25.96 kg/m²     Review of Systems   Constitutional: Positive for activity change. Negative for appetite change, chills, diaphoresis, fatigue, fever and unexpected weight change.   HENT: Negative for congestion, ear pain, nosebleeds, postnasal drip, rhinorrhea, sinus pressure, sneezing, sore throat and trouble swallowing.    Eyes: Negative for photophobia, pain and visual disturbance.   Respiratory: Negative for apnea, cough, choking, chest tightness, shortness of breath and wheezing.    Cardiovascular: Negative for chest pain, palpitations and leg swelling.   Gastrointestinal: Negative for abdominal pain, anorexia, blood in stool, change in bowel habit, constipation, diarrhea, nausea and vomiting.   Endocrine:        Hx dm2   Genitourinary: Negative for decreased urine volume, difficulty urinating, dysuria, hematuria and urgency.   Musculoskeletal: Positive for arthralgias and joint swelling. Negative for back pain, gait problem, myalgias, neck pain and neck stiffness.   Skin: Positive for  color change. Negative for pallor, rash and wound.   Allergic/Immunologic: Positive for immunocompromised state (dm2). Negative for environmental allergies and food allergies.   Neurological: Negative for dizziness, vertigo, tremors, seizures, syncope, weakness, light-headedness, numbness and headaches.   Hematological: Negative for adenopathy. Does not bruise/bleed easily.   Psychiatric/Behavioral: Negative for agitation, confusion, decreased concentration, hallucinations and sleep disturbance. The patient is not nervous/anxious.        Objective:      Physical Exam   Constitutional: He is oriented to person, place, and time. He appears well-developed and well-nourished. He is cooperative. No distress.   HENT:   Head: Normocephalic and atraumatic.   Eyes: Conjunctivae are normal. Right eye exhibits no discharge. Left eye exhibits no discharge.   Cardiovascular: Normal rate, regular rhythm and normal heart sounds.    No murmur heard.  Pulmonary/Chest: Effort normal and breath sounds normal. No respiratory distress. He has no wheezes. He has no rales. He exhibits no tenderness.   Abdominal: Soft. He exhibits no distension.   Musculoskeletal:        Right foot: There is decreased range of motion, tenderness, bony tenderness and swelling. There is normal capillary refill, no crepitus, no deformity and no laceration.        Feet:    2+ pedal pulses. nv intact, decreased range of motion due to pain.   Neurological: He is alert and oriented to person, place, and time.   Skin: Skin is warm and dry. No rash noted. He is not diaphoretic.   Psychiatric: He has a normal mood and affect. His behavior is normal. Judgment and thought content normal.   Nursing note and vitals reviewed.      Assessment:       1. Acute gout involving toe of right foot, unspecified cause        Plan:       [place] was seen today for foot pain.    Diagnoses and all orders for this visit:    Acute gout involving toe of right foot, unspecified  cause  -     colchicine 0.6 mg tablet; Take 2 tablets once, then 1 tablet in 1 hour.    Self care for gout  Rest by elevating the affected joint.    An ice pack may help.  Apply the ice pack for 15 minutes at a time.    -Reduce the amount of animal protein you eat.   Organ meats (liver, brains, kidney and sweetbreads), anchovies, herring and mackerel are particularly high in purines.   -Avoid alcohol. Alcohol can inhibit the excretion of uric acid. If you're having a gout attack, it's best to avoid alcohol completely.   -Drink plenty of liquids. Fluids help dilute uric acid in your blood and urine, so be sure you get enough water and other fluids every day and to help avoid forming kidney stones.  Gout diet given  Follow up in 1 week if symptoms do not resolve, sooner if worse.

## 2018-03-29 NOTE — PATIENT INSTRUCTIONS
Gout Diet  Gout is a painful condition caused by an excess of uric acid, a waste product made by the body. Uric acid forms crystals that collect in the joints. The immune response to these crystals brings on symptoms of joint pain and swelling. This is called a gout attack. Often, medications and diet changes are combined to manage gout. Below are some guidelines for changing your diet to help you manage gout and prevent attacks. Your health care provider will help you determine the best eating plan for you.     Eating to manage gout  Weight loss for those who are overweight may help reduce gout attacks.  Eat less of these foods  Eating too many foods containing purines may raise the levels of uric acid in your body. This raises your risk for a gout attack. Try to limit these foods and drinks:  · Alcohol, such as beer and red wine. You may be told to avoid alcohol completely.  · Soft drinks that contain sugar or high fructose corn syrup  · Certain fish, including anchovies, sardines, fish eggs, and herring  · Shellfish  · Certain meats, such as red meat, hot dogs, luncheon meats, and turkey  · Organ meats, such as liver, kidneys, and sweetbreads  · Legumes, such as dried beans and peas  · Other high fat foods such as gravy, whole milk, and high fat cheeses  · Vegetables such as asparagus, cauliflower, spinach, and mushrooms used to be thought to contribute to an increased risk for a gout attack, but recent studies show that high purine vegetables don't increase the risk for a gout attack.  Eat more of these foods  Other foods may be helpful for people with gout. Add some of these foods to your diet:  · Cherries contain chemicals that may lower uric acid.  · Omega fatty acids. These are found in some fatty fish such as salmon, certain oils (flax, olive, or nut), and nuts themselves. Omega fatty acids may help prevent inflammation due to gout.  · Dairy products that are low-fat or fat-free, such as cheese and  yogurt  · Complex carbohydrate foods, including whole grains, brown rice, oats, and beans  · Coffee, in moderation  · Water, approximately 64 ounces per day  Follow-up care  Follow up with your healthcare provider as advised.  When to seek medical advice  Call your healthcare provider right away if any of these occur:  · Return of gout symptoms, usually at night:  · Severe pain, swelling, and heat in a joint, especially the base of the big toe  · Affected joint is hard to move  · Skin of the affected joint is purple or red  · Fever of 100.4°F (38°C) or higher  · Pain that doesn't get better even with prescribed medicine   Date Last Reviewed: 1/12/2016  © 6915-8698 [employer], [employer]. [address] [place] [address], [place] [address]. All rights reserved. This information is not intended as a substitute for professional medical care. Always follow your healthcare professional's instructions.

## 2018-04-05 ENCOUNTER — PES CALL (OUTPATIENT)
Dept: ADMINISTRATIVE | Facility: CLINIC | Age: 78
End: 2018-04-05

## 2018-04-12 ENCOUNTER — LAB VISIT (OUTPATIENT)
Dept: LAB | Facility: HOSPITAL | Age: 78
End: 2018-04-12
Attending: INTERNAL MEDICINE
Payer: MEDICARE

## 2018-04-12 DIAGNOSIS — E11.42 TYPE 2 DIABETES MELLITUS WITH DIABETIC POLYNEUROPATHY, WITHOUT LONG-TERM CURRENT USE OF INSULIN: ICD-10-CM

## 2018-04-12 LAB
CHOLEST SERPL-MCNC: 136 MG/DL
CHOLEST/HDLC SERPL: 4.3 {RATIO}
ESTIMATED AVG GLUCOSE: 114 MG/DL
HBA1C MFR BLD HPLC: 5.6 %
HDLC SERPL-MCNC: 32 MG/DL
HDLC SERPL: 23.5 %
LDLC SERPL CALC-MCNC: 84.8 MG/DL
NONHDLC SERPL-MCNC: 104 MG/DL
TRIGL SERPL-MCNC: 96 MG/DL

## 2018-04-12 PROCEDURE — 83036 HEMOGLOBIN GLYCOSYLATED A1C: CPT

## 2018-04-12 PROCEDURE — 80061 LIPID PANEL: CPT

## 2018-04-12 PROCEDURE — 36415 COLL VENOUS BLD VENIPUNCTURE: CPT | Mod: PO

## 2018-04-19 ENCOUNTER — OFFICE VISIT (OUTPATIENT)
Dept: INTERNAL MEDICINE | Facility: CLINIC | Age: 78
End: 2018-04-19
Payer: MEDICARE

## 2018-04-19 VITALS
WEIGHT: 209 LBS | HEART RATE: 74 BPM | SYSTOLIC BLOOD PRESSURE: 122 MMHG | BODY MASS INDEX: 25.99 KG/M2 | HEIGHT: 75 IN | DIASTOLIC BLOOD PRESSURE: 70 MMHG | TEMPERATURE: 98 F

## 2018-04-19 DIAGNOSIS — E11.42 TYPE 2 DIABETES MELLITUS WITH DIABETIC POLYNEUROPATHY, WITHOUT LONG-TERM CURRENT USE OF INSULIN: Primary | ICD-10-CM

## 2018-04-19 DIAGNOSIS — E11.69 COMBINED HYPERLIPIDEMIA ASSOCIATED WITH TYPE 2 DIABETES MELLITUS: Chronic | ICD-10-CM

## 2018-04-19 DIAGNOSIS — E78.2 COMBINED HYPERLIPIDEMIA ASSOCIATED WITH TYPE 2 DIABETES MELLITUS: Chronic | ICD-10-CM

## 2018-04-19 DIAGNOSIS — I15.2 HYPERTENSION ASSOCIATED WITH DIABETES: Chronic | ICD-10-CM

## 2018-04-19 DIAGNOSIS — N40.1 BENIGN PROSTATIC HYPERPLASIA (BPH) WITH URINARY URGE INCONTINENCE: ICD-10-CM

## 2018-04-19 DIAGNOSIS — D62 ACUTE BLOOD LOSS ANEMIA: ICD-10-CM

## 2018-04-19 DIAGNOSIS — N39.41 BENIGN PROSTATIC HYPERPLASIA (BPH) WITH URINARY URGE INCONTINENCE: ICD-10-CM

## 2018-04-19 DIAGNOSIS — E11.59 HYPERTENSION ASSOCIATED WITH DIABETES: Chronic | ICD-10-CM

## 2018-04-19 PROCEDURE — 99214 OFFICE O/P EST MOD 30 MIN: CPT | Mod: S$GLB,,, | Performed by: INTERNAL MEDICINE

## 2018-04-19 PROCEDURE — 3078F DIAST BP <80 MM HG: CPT | Mod: CPTII,S$GLB,, | Performed by: INTERNAL MEDICINE

## 2018-04-19 PROCEDURE — 3044F HG A1C LEVEL LT 7.0%: CPT | Mod: CPTII,S$GLB,, | Performed by: INTERNAL MEDICINE

## 2018-04-19 PROCEDURE — 99999 PR PBB SHADOW E&M-EST. PATIENT-LVL III: CPT | Mod: PBBFAC,,, | Performed by: INTERNAL MEDICINE

## 2018-04-19 PROCEDURE — 99499 UNLISTED E&M SERVICE: CPT | Mod: S$GLB,,, | Performed by: INTERNAL MEDICINE

## 2018-04-19 PROCEDURE — 3074F SYST BP LT 130 MM HG: CPT | Mod: CPTII,S$GLB,, | Performed by: INTERNAL MEDICINE

## 2018-04-19 RX ORDER — TAMSULOSIN HYDROCHLORIDE 0.4 MG/1
0.4 CAPSULE ORAL DAILY
Qty: 30 CAPSULE | Refills: 11 | Status: SHIPPED | OUTPATIENT
Start: 2018-04-19 | End: 2019-01-03 | Stop reason: ALTCHOICE

## 2018-04-19 NOTE — ASSESSMENT & PLAN NOTE
Diabetes continues to do well at this time.  Most recent a1c is 5.6.  We will continue the current regimen.  Focus on a low carbohydrate diet and consistent exercise.

## 2018-04-19 NOTE — PROGRESS NOTES
Subjective:       Patient ID: Srinath Mcknight is a 77 y.o. male.    Chief Complaint: No chief complaint on file.    HPI  Patient is a 77-year-old male coming in following up on his diabetes, hypertension, hyperlipidemia, anemia, BPH.  He indicates he is feeling very well this time.  He had his spine surgery done in March and has been doing very well since then he states.  He states he is feeling better than he has in the last year and a half.    His diabetes continues show excellent control.  His last A1c was 5.6.  He is not having any issues with hypoglycemia or significant hyperglycemia.    Blood pressure remained stable at and controlled at this time.  He is tolerating his medications.  He has no signs or symptoms of cardiac decompensation.    Lipids are stable at this point.  He is tolerating his medication without adverse effects.  Continues to work on diet and exercise.    He has mild anemia which we are going to do some follow-up lab work on in July.  He is not demonstrating any symptoms of hypotension or dizziness or lightheadedness.    He indicates he's having some trouble with some urge incontinence.  Patient has no history of BPH.  He indicates when he has to go the bathroom he is having some problems now with leakage or difficulty getting the bathroom in time.  He has tried doing some scheduled bathroom breaks but it just hasn't been as effective as he would like.  He was wondering what options we might have.    Review of Systems   Constitutional: Negative for fever and unexpected weight change.   HENT: Negative for hearing loss, postnasal drip and rhinorrhea.    Eyes: Negative for pain and visual disturbance.   Respiratory: Negative for cough, shortness of breath and wheezing.    Cardiovascular: Negative for chest pain and palpitations.   Gastrointestinal: Negative for constipation, diarrhea, nausea and vomiting.   Genitourinary: Positive for frequency and urgency. Negative for dysuria and hematuria.  "  Musculoskeletal: Negative for arthralgias, back pain, myalgias and neck stiffness.   Skin: Negative for pallor and rash.   Neurological: Negative for seizures, syncope and headaches.   Hematological: Negative for adenopathy.   Psychiatric/Behavioral: Negative for dysphoric mood. The patient is not nervous/anxious.        Objective:   /70   Pulse 74   Temp 97.6 °F (36.4 °C) (Tympanic)   Ht 6' 3" (1.905 m)   Wt 94.8 kg (208 lb 15.9 oz)   BMI 26.12 kg/m²      Physical Exam   Constitutional: He is oriented to person, place, and time. He appears well-developed and well-nourished. No distress.   HENT:   Head: Normocephalic and atraumatic.   Mouth/Throat: Oropharynx is clear and moist.   Eyes: EOM are normal. Pupils are equal, round, and reactive to light.   Neck: Normal range of motion. Neck supple. No JVD present. No thyromegaly present.   Cardiovascular: Normal rate, regular rhythm, normal heart sounds and intact distal pulses.  Exam reveals no gallop and no friction rub.    No murmur heard.  Pulmonary/Chest: Effort normal and breath sounds normal. He has no wheezes. He has no rales.   Abdominal: Soft. Bowel sounds are normal. He exhibits no distension. There is no tenderness. There is no rebound and no guarding.   Musculoskeletal: Normal range of motion. He exhibits no edema.   Lymphadenopathy:     He has no cervical adenopathy.   Neurological: He is alert and oriented to person, place, and time. He has normal reflexes. No cranial nerve deficit.   Skin: Skin is warm and dry. No rash noted.   Psychiatric: He has a normal mood and affect. Judgment normal.   Vitals reviewed.      Lab Visit on 04/12/2018   Component Date Value    Hemoglobin A1C 04/12/2018 5.6     Estimated Avg Glucose 04/12/2018 114     Cholesterol 04/12/2018 136     Triglycerides 04/12/2018 96     HDL 04/12/2018 32*    LDL Cholesterol 04/12/2018 84.8     HDL/Chol Ratio 04/12/2018 23.5     Total Cholesterol/HDL Ra* 04/12/2018 4.3     " Non-HDL Cholesterol 04/12/2018 104    Lab Visit on 04/12/2018   Component Date Value    Microalbum.,U,Random 04/12/2018 6.0     Creatinine, Random Ur 04/12/2018 146.0     Microalb Creat Ratio 04/12/2018 4.1        Assessment:       1. Type 2 diabetes mellitus with diabetic polyneuropathy, without long-term current use of insulin    2. Hypertension associated with diabetes    3. Combined hyperlipidemia associated with type 2 diabetes mellitus    4. Acute blood loss anemia    5. Benign prostatic hyperplasia (BPH) with urinary urge incontinence        Plan:   Type 2 diabetes mellitus with diabetic polyneuropathy, without long-term current use of insulin  Diabetes continues to do well at this time.  Most recent a1c is 5.6.  We will continue the current regimen.  Focus on a low carbohydrate diet and consistent exercise.        Hypertension associated with diabetes  Blood pressure is under good control.  We will continue the current regimen.  Will work on regular aerobic exercise and a low salt diet.        Combined hyperlipidemia associated with type 2 diabetes mellitus  Cholesterol numbers look good.  We will continue the current regimen at this time.  Remain focused on low fat diet and high dietary fiber intake.      Diagnoses and all orders for this visit:    Type 2 diabetes mellitus with diabetic polyneuropathy, without long-term current use of insulin  -     Comprehensive metabolic panel; Future  -     Hemoglobin A1c; Future  -     Lipid panel; Future  -     Microalbumin/creatinine urine ratio; Future    Hypertension associated with diabetes    Combined hyperlipidemia associated with type 2 diabetes mellitus    Acute blood loss anemia  Comments:  Follow up labs in July.  Orders:  -     CBC auto differential; Future  -     Iron and TIBC; Future  -     Ferritin; Future    Benign prostatic hyperplasia (BPH) with urinary urge incontinence  Comments:  Start tamsulosin 0.4 mg once daily for urge  incontinence  Orders:  -     tamsulosin (FLOMAX) 0.4 mg Cp24; Take 1 capsule (0.4 mg total) by mouth once daily.        Follow-up in about 6 months (around 10/19/2018).

## 2018-05-14 PROBLEM — M54.42 CHRONIC BILATERAL LOW BACK PAIN WITH BILATERAL SCIATICA: Chronic | Status: ACTIVE | Noted: 2017-12-15

## 2018-05-14 PROBLEM — G89.29 CHRONIC BILATERAL LOW BACK PAIN WITH BILATERAL SCIATICA: Chronic | Status: ACTIVE | Noted: 2017-12-15

## 2018-05-14 PROBLEM — M54.41 CHRONIC BILATERAL LOW BACK PAIN WITH BILATERAL SCIATICA: Chronic | Status: ACTIVE | Noted: 2017-12-15

## 2018-05-14 PROBLEM — M1A.0710 IDIOPATHIC CHRONIC GOUT OF RIGHT FOOT WITHOUT TOPHUS: Chronic | Status: ACTIVE | Noted: 2018-01-12

## 2018-05-15 ENCOUNTER — OFFICE VISIT (OUTPATIENT)
Dept: CARDIOLOGY | Facility: CLINIC | Age: 78
End: 2018-05-15
Payer: MEDICARE

## 2018-05-15 ENCOUNTER — OFFICE VISIT (OUTPATIENT)
Dept: INTERNAL MEDICINE | Facility: CLINIC | Age: 78
End: 2018-05-15
Payer: MEDICARE

## 2018-05-15 VITALS
SYSTOLIC BLOOD PRESSURE: 126 MMHG | DIASTOLIC BLOOD PRESSURE: 60 MMHG | WEIGHT: 212.06 LBS | HEART RATE: 70 BPM | TEMPERATURE: 98 F | BODY MASS INDEX: 26.37 KG/M2 | HEIGHT: 75 IN | OXYGEN SATURATION: 98 %

## 2018-05-15 VITALS
HEIGHT: 75 IN | DIASTOLIC BLOOD PRESSURE: 52 MMHG | BODY MASS INDEX: 26.24 KG/M2 | WEIGHT: 211 LBS | HEART RATE: 60 BPM | SYSTOLIC BLOOD PRESSURE: 116 MMHG

## 2018-05-15 DIAGNOSIS — I25.10 CORONARY ARTERY DISEASE, OCCLUSIVE: Primary | ICD-10-CM

## 2018-05-15 DIAGNOSIS — E78.2 COMBINED HYPERLIPIDEMIA ASSOCIATED WITH TYPE 2 DIABETES MELLITUS: Chronic | ICD-10-CM

## 2018-05-15 DIAGNOSIS — N40.0 BENIGN PROSTATIC HYPERPLASIA, UNSPECIFIED WHETHER LOWER URINARY TRACT SYMPTOMS PRESENT: ICD-10-CM

## 2018-05-15 DIAGNOSIS — I10 ESSENTIAL HYPERTENSION: Chronic | ICD-10-CM

## 2018-05-15 DIAGNOSIS — I70.0 AORTIC CALCIFICATION: Chronic | ICD-10-CM

## 2018-05-15 DIAGNOSIS — M54.41 CHRONIC BILATERAL LOW BACK PAIN WITH BILATERAL SCIATICA: Chronic | ICD-10-CM

## 2018-05-15 DIAGNOSIS — E11.42 TYPE 2 DIABETES MELLITUS WITH DIABETIC POLYNEUROPATHY, WITHOUT LONG-TERM CURRENT USE OF INSULIN: Chronic | ICD-10-CM

## 2018-05-15 DIAGNOSIS — Z00.00 ENCOUNTER FOR PREVENTIVE HEALTH EXAMINATION: Primary | ICD-10-CM

## 2018-05-15 DIAGNOSIS — E11.69 COMBINED HYPERLIPIDEMIA ASSOCIATED WITH TYPE 2 DIABETES MELLITUS: Chronic | ICD-10-CM

## 2018-05-15 DIAGNOSIS — G89.29 CHRONIC BILATERAL LOW BACK PAIN WITH BILATERAL SCIATICA: Chronic | ICD-10-CM

## 2018-05-15 DIAGNOSIS — H91.93 BILATERAL HEARING LOSS, UNSPECIFIED HEARING LOSS TYPE: ICD-10-CM

## 2018-05-15 DIAGNOSIS — D64.9 ANEMIA DUE TO UNKNOWN MECHANISM: ICD-10-CM

## 2018-05-15 DIAGNOSIS — G47.33 OSA (OBSTRUCTIVE SLEEP APNEA): Chronic | ICD-10-CM

## 2018-05-15 DIAGNOSIS — M54.42 CHRONIC BILATERAL LOW BACK PAIN WITH BILATERAL SCIATICA: Chronic | ICD-10-CM

## 2018-05-15 DIAGNOSIS — G47.61 PLMD (PERIODIC LIMB MOVEMENT DISORDER): Chronic | ICD-10-CM

## 2018-05-15 DIAGNOSIS — M1A.0710 IDIOPATHIC CHRONIC GOUT OF RIGHT FOOT WITHOUT TOPHUS: Chronic | ICD-10-CM

## 2018-05-15 PROCEDURE — 99999 PR PBB SHADOW E&M-EST. PATIENT-LVL V: CPT | Mod: PBBFAC,,, | Performed by: NURSE PRACTITIONER

## 2018-05-15 PROCEDURE — 99499 UNLISTED E&M SERVICE: CPT | Mod: S$GLB,,, | Performed by: NUCLEAR MEDICINE

## 2018-05-15 PROCEDURE — 3044F HG A1C LEVEL LT 7.0%: CPT | Mod: CPTII,S$GLB,, | Performed by: NURSE PRACTITIONER

## 2018-05-15 PROCEDURE — 3078F DIAST BP <80 MM HG: CPT | Mod: CPTII,S$GLB,, | Performed by: NURSE PRACTITIONER

## 2018-05-15 PROCEDURE — G0439 PPPS, SUBSEQ VISIT: HCPCS | Mod: S$GLB,,, | Performed by: NURSE PRACTITIONER

## 2018-05-15 PROCEDURE — 3078F DIAST BP <80 MM HG: CPT | Mod: CPTII,S$GLB,, | Performed by: NUCLEAR MEDICINE

## 2018-05-15 PROCEDURE — 99499 UNLISTED E&M SERVICE: CPT | Mod: S$GLB,,, | Performed by: NURSE PRACTITIONER

## 2018-05-15 PROCEDURE — 3044F HG A1C LEVEL LT 7.0%: CPT | Mod: CPTII,S$GLB,, | Performed by: NUCLEAR MEDICINE

## 2018-05-15 PROCEDURE — 3074F SYST BP LT 130 MM HG: CPT | Mod: CPTII,S$GLB,, | Performed by: NUCLEAR MEDICINE

## 2018-05-15 PROCEDURE — 3074F SYST BP LT 130 MM HG: CPT | Mod: CPTII,S$GLB,, | Performed by: NURSE PRACTITIONER

## 2018-05-15 PROCEDURE — 99214 OFFICE O/P EST MOD 30 MIN: CPT | Mod: S$GLB,,, | Performed by: NUCLEAR MEDICINE

## 2018-05-15 PROCEDURE — 99999 PR PBB SHADOW E&M-EST. PATIENT-LVL III: CPT | Mod: PBBFAC,,, | Performed by: NUCLEAR MEDICINE

## 2018-05-15 NOTE — PATIENT INSTRUCTIONS
Counseling and Referral of Other Preventative  (Italic type indicates deductible and co-insurance are waived)    Patient Name: Srinath Mcknight  Today's Date: 5/15/2018    Health Maintenance       Date Due Completion Date    TETANUS VACCINE 10/04/2015 10/4/2005    PROSTATE-SPECIFIC ANTIGEN 03/07/2018 3/7/2017    Influenza Vaccine 08/01/2018 10/12/2017    Override on 9/16/2015: Done (approx day.)    Override on 11/11/2014: Done    Override on 10/4/2013: Done    Override on 10/3/2011: Done    Hemoglobin A1c 10/12/2018 4/12/2018    Foot Exam 12/06/2018 12/6/2017 (Done)    Override on 12/6/2017: Done    Override on 12/6/2016: Done    Override on 9/13/2016: Done    Override on 11/10/2015: Done    Override on 9/1/2015: Done    Override on 10/8/2013: Done    Override on 6/14/2012: Done    Eye Exam 01/23/2019 1/23/2018 (Done)    Override on 1/23/2018: Done    Override on 1/10/2017: Done    Override on 12/22/2015: Done    Override on 9/29/2014: Done    Override on 12/12/2012: Done    Lipid Panel 04/12/2019 4/12/2018    High Dose Statin 05/15/2019 5/15/2018    Colonoscopy 10/16/2020 10/16/2017    Override on 12/30/2004: Done (REPEAT 5-10 YRS)        No orders of the defined types were placed in this encounter.    The following information is provided to all patients.  This information is to help you find resources for any of the problems found today that may be affecting your health:                Living healthy guide: www.[facility].[place].gov      Understanding Diabetes: www.diabetes.org      Eating healthy: www.cdc.gov/healthyweight      CDC home safety checklist: www.cdc.gov/steadi/patient.html      Agency on Aging: www.goea.[place].[facility]      Alcoholics anonymous (AA): www.aa.org      Physical Activity: www.michael.nih.gov/bt2pgio      Tobacco use: www.quitwithusla.org

## 2018-05-15 NOTE — PROGRESS NOTES
"Srinath Mcknight presented for a  Medicare AWV and comprehensive Health Risk Assessment today. The following components were reviewed and updated:    · Medical history  · Family History  · Social history  · Allergies and Current Medications  · Health Risk Assessment  · Health Maintenance  · Care Team     ** See Completed Assessments for Annual Wellness Visit within the encounter summary.**       The following assessments were completed:  · Living Situation  · CAGE  · Depression Screening  · Timed Get Up and Go  · Whisper Test  · Cognitive Function Screening  · Nutrition Screening  · ADL Screening  · PAQ Screening    Vitals:    05/15/18 1142   BP: 126/60   BP Location: Right arm   Pulse: 70   Temp: 97.8 °F (36.6 °C)   TempSrc: Tympanic   SpO2: 98%   Weight: 96.2 kg (212 lb 1.3 oz)   Height: 6' 3" (1.905 m)     Body mass index is 26.51 kg/m².  Physical Exam   Constitutional: He is oriented to person, place, and time. He appears well-developed and well-nourished. No distress.   HENT:   Head: Normocephalic and atraumatic.   Eyes: Conjunctivae and EOM are normal. Pupils are equal, round, and reactive to light. No scleral icterus.   Cardiovascular: Normal rate and regular rhythm.  Exam reveals no gallop and no friction rub.    No murmur heard.  Pulmonary/Chest: Effort normal and breath sounds normal.   Neurological: He is alert and oriented to person, place, and time.   Skin: Skin is warm and dry.   Psychiatric: He has a normal mood and affect.   Vitals reviewed.        Diagnoses and health risks identified today and associated recommendations/orders:    1. Encounter for preventive health examination  Abnormal get up and go time. Uses cane and has life alert. Discussed fall precautions. H/M reviewed.    2. Type 2 diabetes mellitus with diabetic polyneuropathy, without long-term current use of insulin  Lab Results   Component Value Date    HGBA1C 5.6 04/12/2018   Stable and controlled on glipizide and metformin. Continue " current treatment plan as previously prescribed with your PCP.     3. Combined hyperlipidemia associated with type 2 diabetes mellitus  Lab Results   Component Value Date    CHOL 136 04/12/2018    CHOL 142 03/07/2017    CHOL 138 09/06/2016     Lab Results   Component Value Date    HDL 32 (L) 04/12/2018    HDL 28 (L) 03/07/2017    HDL 29 (L) 09/06/2016     Lab Results   Component Value Date    LDLCALC 84.8 04/12/2018    LDLCALC 87.0 03/07/2017    LDLCALC 84.0 09/06/2016     Lab Results   Component Value Date    TRIG 96 04/12/2018    TRIG 135 03/07/2017    TRIG 125 09/06/2016     Lab Results   Component Value Date    CHOLHDL 23.5 04/12/2018    CHOLHDL 19.7 (L) 03/07/2017    CHOLHDL 21.0 09/06/2016     Stable and controlled on statin. Continue current treatment plan as previously prescribed with your PCP.     4. Aortic calcification  10/30/2009 - CT renal: scattered aortic calcifications.   Blood pressure and lipids controlled. Continue current treatment plan as previously prescribed with your PCP.    5. Essential hypertension  Stable and controlled on losartan-HCTZ. Continue current treatment plan as previously prescribed with your PCP and cardiologist.    6. Idiopathic chronic gout of right foot without tophus  Last flare last month. Occurs in right foot. On allopurinol. Continue current treatment plan as previously prescribed with your PCP.    7. CHARLES (obstructive sleep apnea)  Mild CHARLES: Sleep study 3/9/2017 Doesn't use CPAP. Continue current treatment plan as previously prescribed with your PCP and pulmonologist.    8. PLMD (periodic limb movement disorder)  Stable and controlled on gabapentin. Continue current treatment plan as previously prescribed with your PCP.     9. Chronic bilateral low back pain with bilateral sciatica  Followed by Dr. Roldan. Reports improvement. Continue current treatment plan as previously prescribed with your PM doctor.    10. Anemia due to unknown mechanism  Lab Results   Component Value  Date    WBC 5.21 02/08/2018    HGB 10.9 (L) 02/08/2018    HCT 33.6 (L) 02/08/2018    MCV 87 02/08/2018     02/08/2018     Stable. Patient reports this is due to acute blood loss during hospitalization. Continue current treatment plan as previously prescribed with your PCP.      11. Benign prostatic hyperplasia, unspecified whether lower urinary tract symptoms present  Improved with flomax. Due for repeat PSA screening. Continue current treatment plan as previously prescribed with your PCP.  Lab Results   Component Value Date    PSA 0.99 02/23/2016    PSA 0.44 01/21/2015    PSA 0.46 01/22/2014       12. Bilateral hearing loss, unspecified hearing loss type  Abnormal whisper screen today. He reports he has hearing aids but they are not working well for him. He states he is following up with company soon to get aids adjusted.    Patient to pharmacy today for Tdap.    Provided [place] with a 5-10 year written screening schedule and personal prevention plan. Recommendations were developed using the USPSTF age appropriate recommendations. Education, counseling, and referrals were provided as needed. After Visit Summary printed and given to patient which includes a list of additional screenings\tests needed.    Follow-up in about 1 year (around 5/15/2019) for annual HRA.    Mendy Leo NP

## 2018-05-15 NOTE — PROGRESS NOTES
Subjective:   Patient ID:  Srinath Mcknight is a 77 y.o. male who presents for follow-up of Coronary Artery Disease and Hypertension      HPI CHRONIC CAD- ANGINA FC 2-3,  ATHEROSCLEROSIS OF AA   2- ESSENTIAL HTN  AND DYSLIPIDEMIA WITH DM T 2  RECOVERING FROM BACK SURGERY-  NO RECURRENT ANGINA OR EQUIVALENT  NO UNUSUAL DAMON. NO ORTHOPNEA OR PND  NO PALPITATIONS. NO NEAR SYNCOPE OR SYNCOPE  NO INTERMITTENT CLAUDICATION  NO ABDOMINAL DISCOMFORT  TRACE EDEMA OF ANKLES. NO CALVE TENDERNESS  NO FOCAL CNS SYMPTOMS OR SIGNS TO SUGGEST TIA OR STROKE  CARD MED GOOD COMPLIANCE    Review of Systems   Constitution: Negative for chills, fever, weakness, night sweats, weight gain and weight loss.   HENT: Negative for nosebleeds.    Eyes: Negative for blurred vision, double vision and visual disturbance.   Cardiovascular: Negative for chest pain, dyspnea on exertion, irregular heartbeat, leg swelling, orthopnea, palpitations, paroxysmal nocturnal dyspnea and syncope.   Respiratory: Negative for cough, hemoptysis and wheezing.    Endocrine: Negative for polydipsia and polyuria.   Hematologic/Lymphatic: Does not bruise/bleed easily.   Skin: Negative for rash.   Musculoskeletal: Negative for joint pain, joint swelling, muscle weakness and myalgias.   Gastrointestinal: Negative for abdominal pain, hematemesis, jaundice and melena.   Genitourinary: Negative for dysuria, hematuria and nocturia.   Neurological: Negative for dizziness, focal weakness, headaches and sensory change.   Psychiatric/Behavioral: Negative for depression. The patient does not have insomnia and is not nervous/anxious.      Family History   Problem Relation Age of Onset    Hypertension Mother     Heart disease Mother     Diabetes Mother     Hypertension Father     Heart disease Father     Diabetes Father     Stroke Father     Diabetes Sister     Cancer Brother 50        pancreas    Drug abuse Brother     Prostate cancer Neg Hx     Macular degeneration Neg  Hx     Retinal detachment Neg Hx     Strabismus Neg Hx     Glaucoma Neg Hx     Blindness Neg Hx     Amblyopia Neg Hx     Kidney disease Neg Hx     Mental illness Neg Hx     Mental retardation Neg Hx     COPD Neg Hx     Asthma Neg Hx      Past Medical History:   Diagnosis Date    Anemia due to unknown mechanism 11/10/2015    Angina pectoris 2014    not since    Arthritis     Back pain     Coronary artery disease     Diabetes mellitus 20 years     am 09/29/2014    Diabetes mellitus type II 1994     am 12/22/2015    DM (diabetes mellitus) 1994    BS 80 am 01/10/2017    DM (diabetes mellitus) 1994     01/22/2018    Gout 12/05/2017    right foot     Hyperlipemia     Hypertension     CHARLES (obstructive sleep apnea)     Spinal cord disease     Trouble in sleeping     Type 2 diabetes mellitus with diabetic polyneuropathy, without long-term current use of insulin     Uses hearing aid     bilat     Current Outpatient Prescriptions on File Prior to Visit   Medication Sig Dispense Refill    ACCU-CHEK MULTICLIX LANCET lancets TEST BLOOD SUGAR ONE TIME DAILY 100 each 11    allopurinol (ZYLOPRIM) 100 MG tablet Take 1 tablet (100 mg total) by mouth once daily. 90 tablet 4    aspirin (ECOTRIN) 81 MG EC tablet Take 1 tablet (81 mg total) by mouth once daily.  0    blood sugar diagnostic (ACCU-CHEK FRANKO PLUS TEST STRP) Strp USE  1  STRIP ONE TIME DAILY 100 strip 4    celecoxib (CELEBREX) 200 MG capsule TAKE 1 CAPSULE(200 MG) BY MOUTH EVERY DAY 90 capsule 0    clonazePAM (KLONOPIN) 0.25 MG TbDL DISSOLVE 2 TABLETS BY MOUTH NIGHTLY 60 tablet 0    colchicine 0.6 mg tablet Take 2 tablets once, then 1 tablet in 1 hour. 6 tablet 0    docusate sodium (COLACE) 100 MG capsule Take 1 capsule (100 mg total) by mouth 2 (two) times daily as needed. 60 capsule 0    ferrous sulfate 324 mg (65 mg iron) TbEC Take 325 mg by mouth once daily.      glipiZIDE (GLUCOTROL) 5 MG tablet TAKE 2 TABLETS (10  MG) TWO TIMES DAILY BEFORE MEALS 360 tablet 4    losartan-hydrochlorothiazide 50-12.5 mg (HYZAAR) 50-12.5 mg per tablet TAKE 1 TABLET ONE TIME DAILY 90 tablet 4    metformin (GLUCOPHAGE) 1000 MG tablet TAKE 1 TABLET TWICE DAILY WITH MEALS 180 tablet 4    metoprolol succinate (TOPROL-XL) 50 MG 24 hr tablet TAKE 1 TABLET EVERY DAY 90 tablet 3    multivitamin (ONE DAILY MULTIVITAMIN) per tablet Take 1 tablet by mouth once daily.      pravastatin (PRAVACHOL) 40 MG tablet TAKE 1 TABLET EVERY DAY 90 tablet 4    sildenafil (VIAGRA) 100 MG tablet Take 1 tablet (100 mg total) by mouth as needed. Take on an empty stomach 6 tablet 11    acetaminophen (TYLENOL) 650 MG TbSR Take 1,300 mg by mouth every 8 (eight) hours.      pramipexole (MIRAPEX) 0.25 MG tablet Take 1 tablet (0.25 mg total) by mouth every evening. 30 tablet 5    tamsulosin (FLOMAX) 0.4 mg Cp24 Take 1 capsule (0.4 mg total) by mouth once daily. 30 capsule 11     No current facility-administered medications on file prior to visit.      Review of patient's allergies indicates:   Allergen Reactions    Sulfa (sulfonamide antibiotics)      Can't recall from 1995       Objective:     Physical Exam   Constitutional: He is oriented to person, place, and time. He appears well-developed. No distress.   HENT:   Head: Normocephalic.   Eyes: Conjunctivae are normal. Pupils are equal, round, and reactive to light.   Neck: Neck supple. No JVD present. No thyromegaly present.   Cardiovascular: Normal rate, regular rhythm, normal heart sounds and intact distal pulses.  Exam reveals no gallop and no friction rub.    No murmur heard.  Pulses:       Carotid pulses are 2+ on the right side, and 2+ on the left side.       Radial pulses are 2+ on the right side, and 2+ on the left side.        Femoral pulses are 2+ on the right side, and 2+ on the left side.       Popliteal pulses are 2+ on the right side, and 2+ on the left side.        Dorsalis pedis pulses are 2+ on the  right side, and 2+ on the left side.        Posterior tibial pulses are 2+ on the right side, and 2+ on the left side.   Pulmonary/Chest: Breath sounds normal. He has no wheezes. He has no rales. He exhibits no tenderness.   Abdominal: Soft. Bowel sounds are normal. He exhibits no mass. There is no hepatosplenomegaly. There is no tenderness.   Musculoskeletal: He exhibits no edema or tenderness.        Cervical back: Normal.        Thoracic back: Normal.        Lumbar back: Normal.   Lymphadenopathy:     He has no cervical adenopathy.     He has no axillary adenopathy.        Right: No supraclavicular adenopathy present.        Left: No supraclavicular adenopathy present.   Neurological: He is alert and oriented to person, place, and time. He has normal strength. No sensory deficit. Gait normal.   Skin: Skin is warm. No cyanosis. No pallor. Nails show no clubbing.   Psychiatric: He has a normal mood and affect. His speech is normal and behavior is normal. Cognition and memory are normal.       Assessment:     1. Coronary artery disease, occlusive    2. Essential hypertension    3. Combined hyperlipidemia associated with type 2 diabetes mellitus      STABLE CV STATUS- STABLE ANGINA PATTERN  CONTROLLED BP  NO CLINICAL EVIDENCE OF ADHF  NO ARRHYTHMIAS  CNS STATUS STABLE  Plan:     Coronary artery disease, occlusive    Essential hypertension    Combined hyperlipidemia associated with type 2 diabetes mellitus      1- CONTINUE PRESENT CARD MANAGEMENT    2- RETURN IN 6 MONTHS

## 2018-05-15 NOTE — Clinical Note
Your patient was seen today for a HRA visit.  I have included a copy of my visit note, please review the note and feel free to contact me with any questions.  Thank you for allowing me to participate in the care of your patients.  BROKOLYN Bird

## 2018-05-23 ENCOUNTER — OFFICE VISIT (OUTPATIENT)
Dept: URGENT CARE | Facility: CLINIC | Age: 78
End: 2018-05-23
Payer: MEDICARE

## 2018-05-23 VITALS
HEART RATE: 82 BPM | BODY MASS INDEX: 26.18 KG/M2 | DIASTOLIC BLOOD PRESSURE: 58 MMHG | TEMPERATURE: 97 F | WEIGHT: 210.56 LBS | OXYGEN SATURATION: 99 % | SYSTOLIC BLOOD PRESSURE: 124 MMHG | HEIGHT: 75 IN | RESPIRATION RATE: 16 BRPM

## 2018-05-23 DIAGNOSIS — J01.90 ACUTE NON-RECURRENT SINUSITIS, UNSPECIFIED LOCATION: Primary | ICD-10-CM

## 2018-05-23 PROCEDURE — 3078F DIAST BP <80 MM HG: CPT | Mod: CPTII,S$GLB,, | Performed by: NURSE PRACTITIONER

## 2018-05-23 PROCEDURE — 3074F SYST BP LT 130 MM HG: CPT | Mod: CPTII,S$GLB,, | Performed by: NURSE PRACTITIONER

## 2018-05-23 PROCEDURE — 99999 PR PBB SHADOW E&M-EST. PATIENT-LVL V: CPT | Mod: PBBFAC,,, | Performed by: NURSE PRACTITIONER

## 2018-05-23 PROCEDURE — 99214 OFFICE O/P EST MOD 30 MIN: CPT | Mod: S$GLB,,, | Performed by: NURSE PRACTITIONER

## 2018-05-23 RX ORDER — FLUTICASONE PROPIONATE 50 MCG
2 SPRAY, SUSPENSION (ML) NASAL DAILY
Qty: 1 BOTTLE | Refills: 0 | Status: SHIPPED | OUTPATIENT
Start: 2018-05-23 | End: 2018-11-07

## 2018-05-23 RX ORDER — AMOXICILLIN 875 MG/1
875 TABLET, FILM COATED ORAL 2 TIMES DAILY
Qty: 20 TABLET | Refills: 0 | Status: SHIPPED | OUTPATIENT
Start: 2018-05-23 | End: 2018-06-02

## 2018-05-23 RX ORDER — BENZONATATE 100 MG/1
100 CAPSULE ORAL 3 TIMES DAILY PRN
Qty: 30 CAPSULE | Refills: 0 | Status: SHIPPED | OUTPATIENT
Start: 2018-05-23 | End: 2018-06-02

## 2018-05-23 RX ORDER — CETIRIZINE HYDROCHLORIDE 10 MG/1
10 TABLET ORAL DAILY
Qty: 30 TABLET | Refills: 0 | Status: ON HOLD | OUTPATIENT
Start: 2018-05-23 | End: 2018-09-11

## 2018-05-23 NOTE — PROGRESS NOTES
"Subjective:       Patient ID: Srinath Mcknight is a 77 y.o. male.    Chief Complaint: URI    URI    This is a new problem. The current episode started in the past 7 days. Progression since onset: 3-4 days. There has been no fever. Associated symptoms include congestion, coughing, ear pain, rhinorrhea, sinus pain, sneezing and a sore throat. Pertinent negatives include no abdominal pain, chest pain, diarrhea, dysuria, headaches, joint pain, joint swelling, nausea, neck pain, plugged ear sensation, rash, swollen glands, vomiting or wheezing. Treatments tried: otc cold. The treatment provided no relief.       BP (!) 124/58 (BP Location: Right arm, Patient Position: Sitting, BP Method: Medium (Automatic))   Pulse 82   Temp 97.4 °F (36.3 °C) (Tympanic)   Resp 16   Ht 6' 3" (1.905 m)   Wt 95.5 kg (210 lb 8.6 oz)   SpO2 99%   BMI 26.32 kg/m²     Review of Systems   Constitutional: Positive for activity change. Negative for appetite change, chills, diaphoresis, fatigue, fever and unexpected weight change.   HENT: Positive for congestion, ear pain, postnasal drip, rhinorrhea, sinus pain, sneezing and sore throat. Negative for dental problem, drooling, ear discharge, facial swelling, hearing loss, mouth sores, nosebleeds, sinus pressure, tinnitus, trouble swallowing and voice change.    Eyes: Negative for photophobia, pain, discharge, redness, itching and visual disturbance.   Respiratory: Positive for cough. Negative for apnea, choking, chest tightness, shortness of breath and wheezing.    Cardiovascular: Negative for chest pain, palpitations and leg swelling.   Gastrointestinal: Negative for abdominal distention, abdominal pain, anal bleeding, blood in stool, constipation, diarrhea, nausea, rectal pain and vomiting.   Endocrine: Negative.    Genitourinary: Negative for decreased urine volume, difficulty urinating, dysuria, hematuria and urgency.   Musculoskeletal: Negative for arthralgias, gait problem, joint pain, " joint swelling, myalgias, neck pain and neck stiffness.   Skin: Negative for color change, pallor, rash and wound.   Allergic/Immunologic: Negative for environmental allergies, food allergies and immunocompromised state.   Neurological: Negative for dizziness, tremors, seizures, syncope, facial asymmetry, speech difficulty, weakness, light-headedness, numbness and headaches.   Hematological: Negative for adenopathy. Does not bruise/bleed easily.   Psychiatric/Behavioral: Negative for agitation, behavioral problems, confusion, decreased concentration, dysphoric mood, hallucinations and sleep disturbance. The patient is not nervous/anxious and is not hyperactive.        Objective:      Physical Exam   Constitutional: He is oriented to person, place, and time. He appears well-developed and well-nourished. No distress.   HENT:   Head: Normocephalic and atraumatic.   Right Ear: Tympanic membrane, external ear and ear canal normal. No decreased hearing is noted.   Left Ear: Tympanic membrane, external ear and ear canal normal. No decreased hearing is noted.   Nose: Mucosal edema and rhinorrhea present. No sinus tenderness. No epistaxis. Right sinus exhibits maxillary sinus tenderness and frontal sinus tenderness. Left sinus exhibits maxillary sinus tenderness and frontal sinus tenderness.   Mouth/Throat: Uvula is midline, oropharynx is clear and moist and mucous membranes are normal. No oropharyngeal exudate, posterior oropharyngeal edema, posterior oropharyngeal erythema or tonsillar abscesses.   Eyes: Conjunctivae are normal. Right eye exhibits no discharge. Left eye exhibits no discharge.   Neck: Normal range of motion.   Cardiovascular: Normal rate, regular rhythm and normal heart sounds.    No murmur heard.  Pulmonary/Chest: Effort normal. No accessory muscle usage. No respiratory distress. He has no decreased breath sounds. He has no wheezes. He has no rhonchi. He has no rales. He exhibits no tenderness.    Abdominal: Soft. There is no tenderness.   Musculoskeletal: Normal range of motion. He exhibits no edema.   Neurological: He is alert and oriented to person, place, and time.   Skin: Skin is warm and dry. He is not diaphoretic. No erythema.   Psychiatric: He has a normal mood and affect. His behavior is normal.   Nursing note and vitals reviewed.      Assessment:       1. Acute non-recurrent sinusitis, unspecified location        Plan:       [place] was seen today for uri.    Diagnoses and all orders for this visit:    Acute non-recurrent sinusitis, unspecified location  -     amoxicillin (AMOXIL) 875 MG tablet; Take 1 tablet (875 mg total) by mouth 2 (two) times daily.  -     benzonatate (TESSALON) 100 MG capsule; Take 1 capsule (100 mg total) by mouth 3 (three) times daily as needed for Cough.  -     fluticasone (FLONASE) 50 mcg/actuation nasal spray; 2 sprays (100 mcg total) by Each Nare route once daily.  -     cetirizine (ZYRTEC) 10 MG tablet; Take 1 tablet (10 mg total) by mouth once daily.    Rest  Drink plenty of clear fluids  Nasal saline spray to clear nasal drainage and help with nasal congestion  Zyrtec or Claritin to help dry mucus and post nasal drip  Mucinex or Mucinex DM for cough and chest congestion  Tylenol or Ibuprofen for fever, headache and body aches  Warm salt water gargles for throat comfort  Chloraseptic spray or lozenges for throat comfort  See Primary Care Physician or go to ER if symptoms worsen of fail to improve with treatment.

## 2018-05-23 NOTE — PATIENT INSTRUCTIONS
Acute Sinusitis    Acute sinusitis is irritation and swelling of the sinuses. It is usually caused by a viral infection after a common cold. Your doctor can help you find relief.  What is acute sinusitis?  Sinuses are air-filled spaces in the skull behind the face. They are kept moist and clean by a lining of mucosa. Things such as pollen, smoke, and chemical fumes can irritate the mucosa. It can then swell up. As a response to irritation, the mucosa makes more mucus and other fluids. Tiny hairlike cilia cover the mucosa. Cilia help carry mucus toward the opening of the sinus. Too much mucus may cause the cilia to stop working. This blocks the sinus opening. A buildup of fluid in the sinuses then causes pain and pressure. It can also encourage bacteria to grow in the sinuses.  Common symptoms of acute sinusitis  You may have:  · Facial soreness pain  · Headache  · Fever  · Fluid draining in the back of the throat (postnasal drip)  · Congestion  · Drainage that is thick and colored, instead of clear  · Cough  Diagnosing acute sinusitis  Your doctor will ask about your symptoms and health history. He or she will look at your ear, nose, and throat. You usually won't need to have X-rays taken.    The doctor may take a sample of mucus to check for bacteria. If you have sinusitis that keeps coming back, you may need imaging tests such as X-rays or CAT scans. This will help your doctor check for a structural problem that may be causing the infection.  Treating acute sinusitis  Treatment is aimed at unblocking the sinus opening and helping the cilia work again. You may need to take antihistamine and decongestant medicine. These can reduce inflammation and decrease the amount of fluid your sinuses make. If you have a bacterial infection, you will need to take antibiotic medicine for 10 to 14 days. Take this medicine until it is gone, even if you feel better.  Date Last Reviewed: 10/1/2016  © 6878-5876 [employer] [address] [place] [address]. All rights reserved. This information is not intended as a substitute for professional medical care. Always follow your healthcare professional's instructions.

## 2018-06-11 DIAGNOSIS — G47.50 PARASOMNIA: ICD-10-CM

## 2018-06-19 RX ORDER — CLONAZEPAM 0.25 MG/1
TABLET, ORALLY DISINTEGRATING ORAL
Qty: 90 TABLET | Refills: 0 | Status: ON HOLD | OUTPATIENT
Start: 2018-06-19 | End: 2018-09-14 | Stop reason: HOSPADM

## 2018-07-17 ENCOUNTER — LAB VISIT (OUTPATIENT)
Dept: LAB | Facility: HOSPITAL | Age: 78
End: 2018-07-17
Attending: INTERNAL MEDICINE
Payer: MEDICARE

## 2018-07-17 DIAGNOSIS — D62 ACUTE BLOOD LOSS ANEMIA: ICD-10-CM

## 2018-07-17 LAB
BASOPHILS # BLD AUTO: 0.04 K/UL
BASOPHILS NFR BLD: 0.9 %
DIFFERENTIAL METHOD: ABNORMAL
EOSINOPHIL # BLD AUTO: 0.2 K/UL
EOSINOPHIL NFR BLD: 5.6 %
ERYTHROCYTE [DISTWIDTH] IN BLOOD BY AUTOMATED COUNT: 15.4 %
FERRITIN SERPL-MCNC: 50 NG/ML
HCT VFR BLD AUTO: 33.2 %
HGB BLD-MCNC: 11.1 G/DL
IMM GRANULOCYTES # BLD AUTO: 0.01 K/UL
IMM GRANULOCYTES NFR BLD AUTO: 0.2 %
IRON SERPL-MCNC: 64 UG/DL
LYMPHOCYTES # BLD AUTO: 1.8 K/UL
LYMPHOCYTES NFR BLD: 41.1 %
MCH RBC QN AUTO: 28.8 PG
MCHC RBC AUTO-ENTMCNC: 33.4 G/DL
MCV RBC AUTO: 86 FL
MONOCYTES # BLD AUTO: 0.4 K/UL
MONOCYTES NFR BLD: 9.9 %
NEUTROPHILS # BLD AUTO: 1.8 K/UL
NEUTROPHILS NFR BLD: 42.3 %
NRBC BLD-RTO: 0 /100 WBC
PLATELET # BLD AUTO: 209 K/UL
PMV BLD AUTO: 10.5 FL
RBC # BLD AUTO: 3.86 M/UL
SATURATED IRON: 24 %
TOTAL IRON BINDING CAPACITY: 272 UG/DL
TRANSFERRIN SERPL-MCNC: 184 MG/DL
WBC # BLD AUTO: 4.26 K/UL

## 2018-07-17 PROCEDURE — 85025 COMPLETE CBC W/AUTO DIFF WBC: CPT

## 2018-07-17 PROCEDURE — 83540 ASSAY OF IRON: CPT

## 2018-07-17 PROCEDURE — 36415 COLL VENOUS BLD VENIPUNCTURE: CPT | Mod: PO

## 2018-07-17 PROCEDURE — 82728 ASSAY OF FERRITIN: CPT

## 2018-07-18 ENCOUNTER — TELEPHONE (OUTPATIENT)
Dept: INTERNAL MEDICINE | Facility: CLINIC | Age: 78
End: 2018-07-18

## 2018-07-18 DIAGNOSIS — D64.9 ANEMIA DUE TO UNKNOWN MECHANISM: Primary | ICD-10-CM

## 2018-07-18 NOTE — TELEPHONE ENCOUNTER
Patient was informed of his results via phone.  Patient was set up with hematology.  Appointment mailed for reminder.

## 2018-07-26 ENCOUNTER — OFFICE VISIT (OUTPATIENT)
Dept: SLEEP MEDICINE | Facility: CLINIC | Age: 78
End: 2018-07-26
Payer: MEDICARE

## 2018-07-26 VITALS
RESPIRATION RATE: 17 BRPM | HEART RATE: 71 BPM | WEIGHT: 207.88 LBS | HEIGHT: 75 IN | SYSTOLIC BLOOD PRESSURE: 126 MMHG | DIASTOLIC BLOOD PRESSURE: 70 MMHG | BODY MASS INDEX: 25.85 KG/M2 | OXYGEN SATURATION: 99 %

## 2018-07-26 DIAGNOSIS — G47.52 REM BEHAVIORAL DISORDER: Chronic | ICD-10-CM

## 2018-07-26 DIAGNOSIS — G47.33 OSA (OBSTRUCTIVE SLEEP APNEA): Primary | Chronic | ICD-10-CM

## 2018-07-26 PROCEDURE — 99214 OFFICE O/P EST MOD 30 MIN: CPT | Mod: S$GLB,,, | Performed by: INTERNAL MEDICINE

## 2018-07-26 PROCEDURE — 99999 PR PBB SHADOW E&M-EST. PATIENT-LVL III: CPT | Mod: PBBFAC,,, | Performed by: INTERNAL MEDICINE

## 2018-07-26 PROCEDURE — 3078F DIAST BP <80 MM HG: CPT | Mod: CPTII,S$GLB,, | Performed by: INTERNAL MEDICINE

## 2018-07-26 PROCEDURE — 3074F SYST BP LT 130 MM HG: CPT | Mod: CPTII,S$GLB,, | Performed by: INTERNAL MEDICINE

## 2018-07-26 NOTE — ASSESSMENT & PLAN NOTE
Denies snoring  Talk in sleep  AHI was 6.1/hr ( 38 events)  Borderline CHARLES   Declines interventions in past and now     Discussed Morbidity of untreated CHARLES  Still reluctant to treat

## 2018-07-26 NOTE — ASSESSMENT & PLAN NOTE
Continues safety precautions at home  Klonopin 0.5 mg QHS and melatonin Po  Very infrequent events  Stable on current regime  Sleeps with night on 65 davis  Suggest soft light, 25 watt
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
## 2018-07-27 ENCOUNTER — INITIAL CONSULT (OUTPATIENT)
Dept: HEMATOLOGY/ONCOLOGY | Facility: CLINIC | Age: 78
End: 2018-07-27
Payer: MEDICARE

## 2018-07-27 ENCOUNTER — LAB VISIT (OUTPATIENT)
Dept: LAB | Facility: HOSPITAL | Age: 78
End: 2018-07-27
Attending: INTERNAL MEDICINE
Payer: MEDICARE

## 2018-07-27 VITALS
WEIGHT: 207.44 LBS | DIASTOLIC BLOOD PRESSURE: 80 MMHG | SYSTOLIC BLOOD PRESSURE: 120 MMHG | HEART RATE: 67 BPM | OXYGEN SATURATION: 97 % | HEIGHT: 75 IN | TEMPERATURE: 97 F | RESPIRATION RATE: 18 BRPM | BODY MASS INDEX: 25.79 KG/M2

## 2018-07-27 DIAGNOSIS — D64.9 CHRONIC ANEMIA: ICD-10-CM

## 2018-07-27 DIAGNOSIS — D51.8 OTHER VITAMIN B12 DEFICIENCY ANEMIA: ICD-10-CM

## 2018-07-27 DIAGNOSIS — D51.8 OTHER VITAMIN B12 DEFICIENCY ANEMIA: Primary | ICD-10-CM

## 2018-07-27 LAB
ALBUMIN SERPL BCP-MCNC: 3.7 G/DL
ALP SERPL-CCNC: 83 U/L
ALT SERPL W/O P-5'-P-CCNC: 11 U/L
ANION GAP SERPL CALC-SCNC: 11 MMOL/L
AST SERPL-CCNC: 18 U/L
BILIRUB SERPL-MCNC: 0.4 MG/DL
BUN SERPL-MCNC: 22 MG/DL
CALCIUM SERPL-MCNC: 9.5 MG/DL
CHLORIDE SERPL-SCNC: 107 MMOL/L
CO2 SERPL-SCNC: 25 MMOL/L
CREAT SERPL-MCNC: 1.5 MG/DL
EST. GFR  (AFRICAN AMERICAN): 51.9 ML/MIN/1.73 M^2
EST. GFR  (NON AFRICAN AMERICAN): 44.9 ML/MIN/1.73 M^2
FOLATE SERPL-MCNC: 15.8 NG/ML
GLUCOSE SERPL-MCNC: 122 MG/DL
POTASSIUM SERPL-SCNC: 3.9 MMOL/L
PROT SERPL-MCNC: 6.9 G/DL
SODIUM SERPL-SCNC: 143 MMOL/L
VIT B12 SERPL-MCNC: 223 PG/ML

## 2018-07-27 PROCEDURE — 99205 OFFICE O/P NEW HI 60 MIN: CPT | Mod: S$GLB,,, | Performed by: INTERNAL MEDICINE

## 2018-07-27 PROCEDURE — 80053 COMPREHEN METABOLIC PANEL: CPT

## 2018-07-27 PROCEDURE — 82607 VITAMIN B-12: CPT

## 2018-07-27 PROCEDURE — 84165 PROTEIN E-PHORESIS SERUM: CPT

## 2018-07-27 PROCEDURE — 3074F SYST BP LT 130 MM HG: CPT | Mod: CPTII,S$GLB,, | Performed by: INTERNAL MEDICINE

## 2018-07-27 PROCEDURE — 3079F DIAST BP 80-89 MM HG: CPT | Mod: CPTII,S$GLB,, | Performed by: INTERNAL MEDICINE

## 2018-07-27 PROCEDURE — 84165 PROTEIN E-PHORESIS SERUM: CPT | Mod: 26,,, | Performed by: PATHOLOGY

## 2018-07-27 PROCEDURE — 99999 PR PBB SHADOW E&M-EST. PATIENT-LVL IV: CPT | Mod: PBBFAC,,, | Performed by: INTERNAL MEDICINE

## 2018-07-27 PROCEDURE — 82746 ASSAY OF FOLIC ACID SERUM: CPT

## 2018-07-27 PROCEDURE — 36415 COLL VENOUS BLD VENIPUNCTURE: CPT | Mod: PO

## 2018-07-27 RX ORDER — ZONISAMIDE 50 MG/1
50 CAPSULE ORAL 2 TIMES DAILY
Status: ON HOLD | COMMUNITY
End: 2018-09-14 | Stop reason: HOSPADM

## 2018-07-27 NOTE — PROGRESS NOTES
OUTPATIENT    MAIN COMPLAINT:  We are asked by Dr. Yeison Wilder to evaluate this 77-year-old    gentleman in regard to his anemia.    HISTORY OF PRESENT ILLNESS:  This 77-year-old  gentleman had a   CBC done on 07/17/2018.  It was reported as showing a hemoglobin of 11.1 with an   MCV of 86.  White cell count was 4260 (normal differential), and a platelet   count of 209,000.  Review of the information in the computer shows that his   hemoglobin was pretty close to normal two years ago, but for the last nine   months or so.  It has been ranging from 8.7 to 11.1.  The patient tells me that   he had a colonoscopy within our system in April 2015 that was unremarkable.  He   had another one done in October 2017 outside the clinic that came out okay.    He denies any blood in the stools.    The patient has had multiple orthopedic surgeries.  He had a right hip   replacement in October 2017 requiring 2 units of blood.    The patient had a more recent surgery in March 2018, which was a back surgery.    He did not receive blood at that time.    The patient's lab test before coming to the visit with me include a serum   ferritin that was normal at 50,  a total iron binding capacity that was normal   and a saturated iron level that was also normal at 24.  The patient is on   ferrous sulfate.      PREVIOUS SURGERIES:  Bilateral rotator cuff surgeries, two back surgeries for   sciatica related symptoms, cyst removed from the back, bilateral inguinal hernia   repair, left hip replacement, and left elbow surgery.    SOCIAL HISTORY:  He is single, with two children.  He lives in [place].    Does not smoke or drink.  He was a teacher, , and a sport .    FAMILY HISTORY:  Brother had pancreatic cancer.  Mother, sister, and brother all   have diabetes.  Brother had a heart attack.    PAST MEDICAL HISTORY:  1.  Decreased hearing - wears a hearing aid.  2.  High blood pressure.  3.   Reflux.  4.  Chronic anemia.  5.  Benign prostatic hypertrophy, on Flomax.  6.  Non-insulin-dependent diabetes.  7.  Multiple orthopedic surgeries.    REVIEW OF SYSTEMS:  GENERAL:  Has lost 15 pounds since back surgery in March 2018.  EYES:  No jaundice or paleness.  OROPHARYNX:  No ulcers or exudates.  ENDOCRINE:  No palpable thyroid.  He has a history of diabetes,   non-insulin-dependent.  LUNGS:  Mild shortness of breath on exertion.  CARDIOVASCULAR:  No chest pains or palpitation.  GASTROINTESTINAL:  No nausea, vomiting, or diarrhea.  No blood in the stools.    He had a colonoscopy in October 2017, outside the clinic, apparently came out   okay.  GENITOURINARY:  Takes Flomax for symptoms of BPH.  No hematuria, kidney stones,   or kidney or bladder problems.  NEUROLOGIC:  No headaches or seizures.  PSYCHIATRIC:  No mood swings or depression.      PHYSICAL EXAMINATION:  GENERAL:  He was well groomed and interacted normally during the interview.  VITAL SIGNS:  Weight 207 pounds, height 6 feet 3 inches, blood pressure 120/80,   pulse 65, respirations 18, temperature 97.3, oxygen saturation 97%.  EYES:  No jaundice or paleness.  OROPHARYNX:  No ulcers.  No exudates.  ENDOCRINE:  No palpable thyroid.  LYMPHATICS:  No cervical or supraclavicular adenopathy.  NECK:  No jugular vein distension or masses.  LUNGS:  Clear, well ventilated without rales, wheezes, or rhonchi.  CARDIOVASCULAR:  The heart was rhythmic.  There were no murmurs or gallops.  ABDOMEN:  Soft.  No obvious hepatosplenomegaly, guarding, or rebound.  EXTREMITIES:  No edema.    SKIN:  No blisters or ecchymosis.  NEUROLOGIC:  Coordination and strengths were normal.  He had difficulty   ambulating and was requiring a cane to walk.    DISCUSSION:  This patient has had anemia since at least nine months.  He seems   to have had problems after his hip surgery in October 2017, requiring 2 units of   blood.    His iron studies done recently; however, does not  reflect changes of classic   iron deficiency.    I will order CMP, serum protein electrophoresis, B12, folic acid and I will have   him bring three stool samples to check for occult blood.  Needs to be seen back   in the department within two weeks.      FRANSICO/EVANS  dd: 07/27/2018 11:30:13 (CDT)  td: 07/27/2018 13:37:09 (CDT)  Doc ID   #[id number]  Job ID #[id number]    CC: Yeison Wilder M.D.

## 2018-07-27 NOTE — LETTER
July 27, 2018      Yeison Wilder MD  [address]  [address]  [place] [address]           [facility] - Hematology Oncology  [address] [facility]  [place] [address]  Phone: [phone]  Fax: [phone]          Patient: Srinath Mcknight   MR Number: [id number]   YOB: 1940   Date of Visit: 7/27/2018       Dear Dr. Yeison Wilder:    Thank you for referring Srinath Mcknight to me for evaluation. Attached you will find relevant portions of my assessment and plan of care.    If you have questions, please do not hesitate to call me. I look forward to following Srinath Mcknight along with you.    Sincerely,    Jeremiah Salgado MD    Enclosure  CC:  No Recipients    If you would like to receive this communication electronically, please contact [email or web address] or [phone] to request more information on [facility] access.    For providers and/or their staff who would like to refer a patient to [facility], please contact us through our one-stop-shop provider referral line, [facility] , at [phone].    If you feel you have received this communication in error or would no longer like to receive these types of communications, please e-mail [email or web address]

## 2018-07-28 ENCOUNTER — LAB VISIT (OUTPATIENT)
Dept: LAB | Facility: HOSPITAL | Age: 78
End: 2018-07-28
Attending: INTERNAL MEDICINE
Payer: MEDICARE

## 2018-07-28 DIAGNOSIS — D64.9 CHRONIC ANEMIA: ICD-10-CM

## 2018-07-28 LAB — OB PNL STL: NEGATIVE

## 2018-07-28 PROCEDURE — 82272 OCCULT BLD FECES 1-3 TESTS: CPT | Mod: 91

## 2018-07-29 LAB — OB PNL STL: NEGATIVE

## 2018-07-30 LAB
ALBUMIN SERPL ELPH-MCNC: 3.97 G/DL
ALPHA1 GLOB SERPL ELPH-MCNC: 0.27 G/DL
ALPHA2 GLOB SERPL ELPH-MCNC: 0.72 G/DL
B-GLOBULIN SERPL ELPH-MCNC: 0.7 G/DL
GAMMA GLOB SERPL ELPH-MCNC: 1.05 G/DL
PROT SERPL-MCNC: 6.7 G/DL

## 2018-07-31 LAB — PATHOLOGIST INTERPRETATION SPE: NORMAL

## 2018-08-01 RX ORDER — LANCETS
EACH MISCELLANEOUS
Qty: 300 EACH | Refills: 3 | Status: SHIPPED | OUTPATIENT
Start: 2018-08-01 | End: 2018-08-09 | Stop reason: ALTCHOICE

## 2018-08-09 ENCOUNTER — TELEPHONE (OUTPATIENT)
Dept: INTERNAL MEDICINE | Facility: CLINIC | Age: 78
End: 2018-08-09

## 2018-08-09 ENCOUNTER — TELEPHONE (OUTPATIENT)
Dept: ORTHOPEDICS | Facility: CLINIC | Age: 78
End: 2018-08-09

## 2018-08-09 RX ORDER — LANCETS
1 EACH MISCELLANEOUS DAILY
Qty: 100 EACH | Refills: 2 | Status: SHIPPED | OUTPATIENT
Start: 2018-08-09 | End: 2019-10-10 | Stop reason: SDUPTHER

## 2018-08-09 RX ORDER — DEXTROSE 4 G
1 TABLET,CHEWABLE ORAL DAILY
COMMUNITY
End: 2018-08-09 | Stop reason: SDUPTHER

## 2018-08-09 RX ORDER — DEXTROSE 4 G
1 TABLET,CHEWABLE ORAL DAILY
Qty: 1 EACH | Refills: 0 | Status: SHIPPED | OUTPATIENT
Start: 2018-08-09 | End: 2022-07-28

## 2018-08-09 NOTE — TELEPHONE ENCOUNTER
Called [facility]Dr. Iqbal Office about the patient. Left a message for her to give the office a call back.FP          ----- Message from Michelle Singletary sent at 8/9/2018  4:15 PM CDT -----  Contact: [facility]/Dr. Iqbal [facility]  Call [facility]/Dr. Iqbal Office @[phone]   regarding pt having issues with left hip again and they would like to get him scheduled to come in sooner than the 09/20/2018 appt date

## 2018-08-09 NOTE — TELEPHONE ENCOUNTER
Spoke with Dulce at [facility], she says, they received refill for multiclix lancets, but pt uses fast clix lancets and would like to change. Verbal given to change to fast clix lancets. Lancets are supplied 102/box, 3 refills added. Med card updated.

## 2018-08-09 NOTE — TELEPHONE ENCOUNTER
----- Message from Calvin Redman sent at 8/8/2018  1:09 PM CDT -----  Contact: [facility] [phone]  Would like to follow-up with nurse regarding request for Accu-Check prescription.  Please call back at [phone].   Md Sisi

## 2018-08-10 ENCOUNTER — TELEPHONE (OUTPATIENT)
Dept: ORTHOPEDICS | Facility: CLINIC | Age: 78
End: 2018-08-10

## 2018-08-10 NOTE — TELEPHONE ENCOUNTER
Spoke with the patient about his hip pain. I got the patient scheduled  to see Dr. Alcocer on 8/14/18 at 11:00. Patient verbalized understanding.FP

## 2018-08-13 ENCOUNTER — OFFICE VISIT (OUTPATIENT)
Dept: HEMATOLOGY/ONCOLOGY | Facility: CLINIC | Age: 78
End: 2018-08-13
Payer: MEDICARE

## 2018-08-13 ENCOUNTER — LAB VISIT (OUTPATIENT)
Dept: LAB | Facility: HOSPITAL | Age: 78
End: 2018-08-13
Attending: NURSE PRACTITIONER
Payer: MEDICARE

## 2018-08-13 VITALS
WEIGHT: 211.19 LBS | HEART RATE: 69 BPM | SYSTOLIC BLOOD PRESSURE: 102 MMHG | DIASTOLIC BLOOD PRESSURE: 58 MMHG | HEIGHT: 75 IN | RESPIRATION RATE: 20 BRPM | BODY MASS INDEX: 26.26 KG/M2 | OXYGEN SATURATION: 98 % | TEMPERATURE: 98 F

## 2018-08-13 DIAGNOSIS — D64.9 ANEMIA DUE TO UNKNOWN MECHANISM: Primary | ICD-10-CM

## 2018-08-13 DIAGNOSIS — R79.89 ELEVATED SERUM CREATININE: ICD-10-CM

## 2018-08-13 DIAGNOSIS — D64.9 ANEMIA DUE TO UNKNOWN MECHANISM: ICD-10-CM

## 2018-08-13 DIAGNOSIS — D62 ACUTE BLOOD LOSS ANEMIA: ICD-10-CM

## 2018-08-13 DIAGNOSIS — M25.552 PAIN OF LEFT HIP JOINT: Primary | ICD-10-CM

## 2018-08-13 LAB
BASOPHILS # BLD AUTO: 0.03 K/UL
BASOPHILS NFR BLD: 0.7 %
DIFFERENTIAL METHOD: ABNORMAL
EOSINOPHIL # BLD AUTO: 0.2 K/UL
EOSINOPHIL NFR BLD: 5.4 %
ERYTHROCYTE [DISTWIDTH] IN BLOOD BY AUTOMATED COUNT: 14.5 %
HCT VFR BLD AUTO: 34.2 %
HGB BLD-MCNC: 11.4 G/DL
LYMPHOCYTES # BLD AUTO: 1.6 K/UL
LYMPHOCYTES NFR BLD: 37.4 %
MCH RBC QN AUTO: 28.2 PG
MCHC RBC AUTO-ENTMCNC: 33.3 G/DL
MCV RBC AUTO: 85 FL
MONOCYTES # BLD AUTO: 0.3 K/UL
MONOCYTES NFR BLD: 7.8 %
NEUTROPHILS # BLD AUTO: 2.1 K/UL
NEUTROPHILS NFR BLD: 48.7 %
PLATELET # BLD AUTO: 184 K/UL
PMV BLD AUTO: 9.6 FL
RBC # BLD AUTO: 4.04 M/UL
WBC # BLD AUTO: 4.23 K/UL

## 2018-08-13 PROCEDURE — 3078F DIAST BP <80 MM HG: CPT | Mod: CPTII,,, | Performed by: NURSE PRACTITIONER

## 2018-08-13 PROCEDURE — 36415 COLL VENOUS BLD VENIPUNCTURE: CPT | Mod: PO

## 2018-08-13 PROCEDURE — 99214 OFFICE O/P EST MOD 30 MIN: CPT | Mod: S$PBB,,, | Performed by: NURSE PRACTITIONER

## 2018-08-13 PROCEDURE — 85025 COMPLETE CBC W/AUTO DIFF WBC: CPT | Mod: PO

## 2018-08-13 PROCEDURE — 99999 PR PBB SHADOW E&M-EST. PATIENT-LVL III: CPT | Mod: PBBFAC,,, | Performed by: NURSE PRACTITIONER

## 2018-08-13 PROCEDURE — 3074F SYST BP LT 130 MM HG: CPT | Mod: CPTII,,, | Performed by: NURSE PRACTITIONER

## 2018-08-13 RX ORDER — GABAPENTIN 300 MG/1
300 CAPSULE ORAL 3 TIMES DAILY
COMMUNITY
End: 2020-12-04

## 2018-08-13 NOTE — PROGRESS NOTES
Subjective:       Patient ID: Srinath Mcknight is a 77 y.o. male.    Chief Complaint:  lab results review.    HPI: 77 y.o. male who presents to office for review of lab results. The patient has received a   full workup for anemia of unknown origin. The patient hemoglobin   today is 11.4. Patient denies hematuria, hematochezia, chest pain.The laboratory studies are unremarkable.   The patient has two negative stool occult blood testing on file. The patient continues to take daily iron pills.     Social History     Socioeconomic History    Marital status:      Spouse name: Not on file    Number of children: 0    Years of education: Not on file    Highest education level: Not on file   Social Needs    Financial resource strain: Not on file    Food insecurity - worry: Not on file    Food insecurity - inability: Not on file    Transportation needs - medical: Not on file    Transportation needs - non-medical: Not on file   Occupational History    Occupation: retired     Comment: teacher   Tobacco Use    Smoking status: Never Smoker    Smokeless tobacco: Never Used   Substance and Sexual Activity    Alcohol use: Yes     Alcohol/week: 0.6 oz     Types: 1 Glasses of wine per week     Comment: rarely  No alcohol prior to surgery    Drug use: No    Sexual activity: Yes     Partners: Female     Birth control/protection: Condom   Other Topics Concern    Not on file   Social History Narrative    Single lives alone. No children. Retired but some part time work - 4 hours a day. 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 Caffeine intake - sugar free cola, Rare coffee intake. Still drives. Does have a Living Will . Has a nephew Jt Gayle, lives in [place]. Has a friend Karina Rossi, locally who could help him.        Past Medical History:   Diagnosis Date    Anemia due to unknown mechanism 11/10/2015    Angina pectoris 2014    not since    Arthritis     Back pain     Coronary artery disease     Diabetes  mellitus 20 years     am 09/29/2014    Diabetes mellitus type II 1994     am 12/22/2015    DM (diabetes mellitus) 1994    BS 80 am 01/10/2017    DM (diabetes mellitus) 1994     01/22/2018    Gout 12/05/2017    right foot     Hyperlipemia     Hypertension     CHARLES (obstructive sleep apnea)     Spinal cord disease     Trouble in sleeping     Type 2 diabetes mellitus with diabetic polyneuropathy, without long-term current use of insulin     Uses hearing aid     bilat       Family History   Problem Relation Age of Onset    Hypertension Mother     Heart disease Mother     Diabetes Mother     Hypertension Father     Heart disease Father     Diabetes Father     Stroke Father     Diabetes Sister     Cancer Brother 50        pancreas    Drug abuse Brother     Prostate cancer Neg Hx     Macular degeneration Neg Hx     Retinal detachment Neg Hx     Strabismus Neg Hx     Glaucoma Neg Hx     Blindness Neg Hx     Amblyopia Neg Hx     Kidney disease Neg Hx     Mental illness Neg Hx     Mental retardation Neg Hx     COPD Neg Hx     Asthma Neg Hx        Past Surgical History:   Procedure Laterality Date    BACK SURGERY      HERNIA REPAIR Bilateral 04/18/2017    JOINT REPLACEMENT Left 10/09/2017    L YAHIR Dr. Alcocer    LAMINECTOMY  07/22/2016    left elbow  10/2017    procedure to remove gout    lumbar foraminotomy  03/2018    ROTATOR CUFF REPAIR Left 2006    SHOULDER ARTHROSCOPY W/ ROTATOR CUFF REPAIR Right 2009       Review of Systems   Constitutional: Negative for chills, fatigue, fever and unexpected weight change.   HENT: Negative for nosebleeds, sore throat, trouble swallowing and voice change.    Eyes: Negative for visual disturbance.   Respiratory: Negative for cough, chest tightness and shortness of breath.    Cardiovascular: Negative for chest pain and leg swelling.   Gastrointestinal: Negative for abdominal pain, blood in stool, constipation, diarrhea, nausea and  vomiting.   Genitourinary: Negative for difficulty urinating, dysuria and hematuria.   Musculoskeletal: Positive for arthralgias and gait problem. Negative for myalgias.        Patient walks with a cane   Skin: Negative for rash and wound.   Neurological: Positive for weakness. Negative for dizziness and headaches.   Hematological: Does not bruise/bleed easily.   Psychiatric/Behavioral: The patient is nervous/anxious.          Medication List with Changes/Refills   Current Medications    ACETAMINOPHEN (TYLENOL) 650 MG TBSR    Take 1,300 mg by mouth every 8 (eight) hours.    ALLOPURINOL (ZYLOPRIM) 100 MG TABLET    Take 1 tablet (100 mg total) by mouth once daily.    ASPIRIN (ECOTRIN) 81 MG EC TABLET    Take 1 tablet (81 mg total) by mouth once daily.    BLOOD SUGAR DIAGNOSTIC (ACCU-CHEK FRANKO PLUS TEST STRP) STRP    USE  1  STRIP ONE TIME DAILY    BLOOD-GLUCOSE METER (ACCU-CHEK FRANKO PLUS METER) MISC    1 Device by Misc.(Non-Drug; Combo Route) route once daily.    CELECOXIB (CELEBREX) 200 MG CAPSULE    TAKE 1 CAPSULE(200 MG) BY MOUTH EVERY DAY    CETIRIZINE (ZYRTEC) 10 MG TABLET    Take 1 tablet (10 mg total) by mouth once daily.    CLONAZEPAM (KLONOPIN) 0.25 MG TBDL    DISSOLVE 2 TABLETS BY MOUTH NIGHTLY    CLONAZEPAM (KLONOPIN) 0.25 MG TBDL    DISOLVE 2 TABLETS BY MOUTH EVERY NIGHT AT BEDTIME    COLCHICINE 0.6 MG TABLET    Take 2 tablets once, then 1 tablet in 1 hour.    DIPHTH,PERTUS,ACELL,,TETANUS (BOOSTRIX) 2.5-8-5 LF-MCG-LF/0.5ML SYRG INJECTION    Inject into the muscle.    DOCUSATE SODIUM (COLACE) 100 MG CAPSULE    Take 1 capsule (100 mg total) by mouth 2 (two) times daily as needed.    FERROUS SULFATE 324 MG (65 MG IRON) TBEC    Take 325 mg by mouth once daily.    FLUTICASONE (FLONASE) 50 MCG/ACTUATION NASAL SPRAY    2 sprays (100 mcg total) by Each Nare route once daily.    GABAPENTIN (NEURONTIN) 300 MG CAPSULE    Take 300 mg by mouth 2 (two) times daily.    GLIPIZIDE (GLUCOTROL) 5 MG TABLET    TAKE 2  TABLETS (10 MG) TWO TIMES DAILY BEFORE MEALS    LANCETS (ACCU-CHEK SOFTCLIX LANCETS) MISC    1 lancet by Misc.(Non-Drug; Combo Route) route once daily.    LOSARTAN-HYDROCHLOROTHIAZIDE 50-12.5 MG (HYZAAR) 50-12.5 MG PER TABLET    TAKE 1 TABLET ONE TIME DAILY    METFORMIN (GLUCOPHAGE) 1000 MG TABLET    TAKE 1 TABLET TWICE DAILY WITH MEALS    METOPROLOL SUCCINATE (TOPROL-XL) 50 MG 24 HR TABLET    TAKE 1 TABLET EVERY DAY    MULTIVITAMIN (ONE DAILY MULTIVITAMIN) PER TABLET    Take 1 tablet by mouth once daily.    PRAVASTATIN (PRAVACHOL) 40 MG TABLET    TAKE 1 TABLET EVERY DAY    SILDENAFIL (VIAGRA) 100 MG TABLET    Take 1 tablet (100 mg total) by mouth as needed. Take on an empty stomach    TAMSULOSIN (FLOMAX) 0.4 MG CP24    Take 1 capsule (0.4 mg total) by mouth once daily.    ZONISAMIDE (ZONEGRAN) 50 MG CAP    Take by mouth 2 (two) times daily.     Objective:     Vitals:    08/13/18 1154   BP: (!) 102/58   Pulse: 69   Resp: 20   Temp: 97.6 °F (36.4 °C)     Lab Results   Component Value Date    WBC 4.23 08/13/2018    HGB 11.4 (L) 08/13/2018    HCT 34.2 (L) 08/13/2018    MCV 85 08/13/2018     08/13/2018     CMP  Sodium   Date Value Ref Range Status   07/27/2018 143 136 - 145 mmol/L Final     Potassium   Date Value Ref Range Status   07/27/2018 3.9 3.5 - 5.1 mmol/L Final     Chloride   Date Value Ref Range Status   07/27/2018 107 95 - 110 mmol/L Final     CO2   Date Value Ref Range Status   07/27/2018 25 23 - 29 mmol/L Final     Glucose   Date Value Ref Range Status   07/27/2018 122 (H) 70 - 110 mg/dL Final     BUN, Bld   Date Value Ref Range Status   07/27/2018 22 8 - 23 mg/dL Final     Creatinine   Date Value Ref Range Status   07/27/2018 1.5 (H) 0.5 - 1.4 mg/dL Final     Calcium   Date Value Ref Range Status   07/27/2018 9.5 8.7 - 10.5 mg/dL Final     Total Protein   Date Value Ref Range Status   07/27/2018 6.9 6.0 - 8.4 g/dL Final     Albumin   Date Value Ref Range Status   07/27/2018 3.7 3.5 - 5.2 g/dL Final      Total Bilirubin   Date Value Ref Range Status   07/27/2018 0.4 0.1 - 1.0 mg/dL Final     Comment:     For infants and newborns, interpretation of results should be based  on gestational age, weight and in agreement with clinical  observations.  Premature Infant recommended reference ranges:  Up to 24 hours.............<8.0 mg/dL  Up to 48 hours............<12.0 mg/dL  3-5 days..................<15.0 mg/dL  6-29 days.................<15.0 mg/dL       Alkaline Phosphatase   Date Value Ref Range Status   07/27/2018 83 55 - 135 U/L Final     AST   Date Value Ref Range Status   07/27/2018 18 10 - 40 U/L Final     ALT   Date Value Ref Range Status   07/27/2018 11 10 - 44 U/L Final     Anion Gap   Date Value Ref Range Status   07/27/2018 11 8 - 16 mmol/L Final     eGFR if    Date Value Ref Range Status   07/27/2018 51.9 (A) >60 mL/min/1.73 m^2 Final     eGFR if non    Date Value Ref Range Status   07/27/2018 44.9 (A) >60 mL/min/1.73 m^2 Final     Comment:     Calculation used to obtain the estimated glomerular filtration  rate (eGFR) is the CKD-EPI equation.      Pathologist Interpretation SPE   Order: [id number]   Status:  Final result   Visible to patient:  Yes (Patient Portal)   Next appt:  08/14/2018 at 03:30 PM in Radiology ([facility])   Component 2wk ago   Pathologist Interpretation SPE REVIEWED    Comment: Electronically reviewed and signed by:   Leny Cooley M.D.   Signed on 07/31/18 at 14:30   Normal total protein, normal pattern.                Physical Exam   Constitutional: He is oriented to person, place, and time. He appears well-developed and well-nourished. He is cooperative. He appears distressed.   HENT:   Head: Normocephalic.   Right Ear: Hearing normal.   Left Ear: Hearing normal.   Nose: Nose normal.   Mouth/Throat: Oropharynx is clear and moist.   Eyes: Conjunctivae, EOM and lids are normal. Right eye exhibits no discharge. Left eye exhibits no discharge.   Neck:  Normal range of motion. No thyroid mass present.   Cardiovascular: Normal rate, regular rhythm and normal heart sounds. Exam reveals no gallop and no friction rub.   No murmur heard.  Pulmonary/Chest: Effort normal and breath sounds normal. No respiratory distress. He has no wheezes. He has no rales. He exhibits no tenderness.   Abdominal: Soft. He exhibits no distension and no mass. There is no tenderness. There is no guarding.   Genitourinary:   Genitourinary Comments: deferred   Musculoskeletal: Normal range of motion. He exhibits no edema.   Patient walks with a cane.    Neurological: He is alert and oriented to person, place, and time.   Skin: Skin is warm, dry and intact.   Psychiatric: His speech is normal and behavior is normal. Thought content normal.   Vitals reviewed.       Assessment:     Problem List Items Addressed This Visit     Anemia due to unknown mechanism - Primary     Patient hemoglobin today is 11.4. Patient's lab work for anemia is negative. Patient has normal B12, folate, Iron studies. SPEP normal. I believe that the patient anemia is ethnicity related. I will see the patient back in 3 months to evaluate CBC, CMP.         Relevant Orders    CBC auto differential (Completed)    CBC auto differential    Comprehensive metabolic panel        Elevated serum creatinine    The patient has had fluctuating creatine levels/eGFR for at least the past 10 months. The patient states he does not drink water often. I believe the etiology to be dehydration. I have counseled the patient on increasing daily fluid intake to prevent kidney injury. I had a detailed discussion with patient related to this and counseled him on techniques that may help him consume more fluids daily. I will see patient back in 3 months and check CMP. If his eGFR is decreased at this time I will consider nephrology consult.              I will review assessment/plan with collaborating physician     ZEINAB Yuen

## 2018-08-13 NOTE — ASSESSMENT & PLAN NOTE
Patient hemoglobin today is 11.4. Patient's lab work for anemia is negative. Patient has normal B12, folate, Iron studies. SPEP normal. I believe that the patient anemia is ethnicity related. I will see the patient back in 3 months to evaluate CBC, CMP.

## 2018-08-13 NOTE — PATIENT INSTRUCTIONS
Anemia  Anemia is a condition that occurs when your body does not have enough healthy red blood cells (RBCs). RBCs are the parts of your blood that carry oxygen throughout your body. A protein called hemoglobin allows your RBCs to absorb and release oxygen. Without enough RBCs or hemoglobin, your body doesn't get enough oxygen. Symptoms of anemia may then occur.    What are the symptoms of anemia?  Some people with anemia have no symptoms. But most people have symptoms that range from mild to severe. These can include:  · Tiredness (fatigue)  · Weakness  · Pale skin  · Shortness of breath  · Dizziness or fainting  · Rapid heartbeat  · Trouble doing normal amounts of activity  · Jaundice (yellowing of your eyes, skin, or mouth; dark urine)  What causes anemia?  Anemia can occur when your body:  · Loses too much blood  · Does not make enough RBCs  · Destroys your RBCs at a faster rate than it can replace them  · Does not make a normal amount of hemoglobin in your RBCs  These problems can occur for many reasons, including:  · A condition that you are born with (congenital or inherited), such as sickle cell disease or thalassemia  · Heavy bleeding for any reason, including injury, surgery, childbirth, or even heavy menstrual periods  · Being low in certain nutrients, such as iron, folate, or vitamin B12, possibly from a poor diet or a condition like celiac disease or Crohn's disease  · Certain chronic conditions like diabetes, arthritis, or kidney disease  · Certain chronic infections like tuberculosis or HIV  · Exposure to certain medicines, such as those used for chemotherapy  There are different types of anemia. Your healthcare provider can tell you more about the type of anemia you have and what may have caused it.  How is anemia diagnosed?  To diagnose anemia, your healthcare provider orders blood tests. These can include:  · Complete blood cell count (CBC). This test measures the amounts of the different types  of blood cells.  · Blood smear. This test checks the size and shape of your blood cells. To do the test, a drop of your blood is viewed under a microscope. A stain is used to make the blood cells easier to see.  · Iron studies. These tests measure the amount of iron in your blood. Your body needs iron to make hemoglobin in your RBCs.  · Vitamin B12 and folate studies. These tests check for some of the components that help give RBCs a normal size and shape.  · Reticulocyte count. This test measures the amount of new RBCs that your bone marrow makes.  · Hemoglobin electrophoresis. This test checks for problems with your hemoglobin in RBCs.  How is anemia treated?  Treatment for anemia is based on the type of anemia, its cause, and the severity of your symptoms. Treatments may include:  · Diet changes. This involves increasing the amount of certain nutrients in your diet, such as iron, vitamin B12, or folate. Your healthcare provider may also prescribe nutrient supplements.  · Medicines. Certain medicines treat the cause of your anemia. Others help build new RBCs or relieve symptoms. If a medicine is the cause of your anemia, you may need to stop or change it.  · Blood transfusions. Replacing some of your blood can increase the number of healthy RBCs in your body.  · Surgery. In some cases, your doctor may do surgery to treat the underlying cause of anemia. If you need surgery, your healthcare provider will explain the procedure and outline the risks and benefits for you.  What are the long-term concerns?  If you have a certain type of anemia, you can expect a full recovery after treatment. If you have other types of anemia (especially a type you're born with), you will need to manage it for life. Your doctor can tell you more.  Date Last Reviewed: 12/1/2016  © 3008-7021 [employer]. [address] [place] [address], [place] [address]. All rights reserved. This information is not intended as a substitute for  professional medical care. Always follow your healthcare professional's instructions.        Dehydration (Adult)  Dehydration occurs when your body loses too much fluid. This may be the result of prolonged vomiting or diarrhea, excessive sweating, or a high fever. It may also happen if you dont drink enough fluid when youre sick or out in the heat. Misuse of diuretics (water pills) can also be a cause.  Symptoms include thirst and decreased urine output. You may also feel dizzy, weak, fatigued, or very drowsy. The diet described below is usually enough to treat dehydration. In some cases, you may need medicine.  Home care  · Drink at least 12 8-ounce glasses of fluid every day to resolve the dehydration. Fluid may include water; orange juice; lemonade; apple, grape, or cranberry juice; clear fruit drinks; electrolyte replacement and sports drinks; and teas and coffee without caffeine. If you have been diagnosed with a kidney disease, ask your doctor how much and what types of fluids you should drink to prevent dehydration. If you have kidney disease, fluid can build up in the body. This can be dangerous to your health.  · If you have a fever, muscle aches, or a headache as a result of a cold or flu, you may take acetaminophen or ibuprofen, unless another medicine was prescribed. If you have chronic liver or kidney disease, or have ever had a stomach ulcer or gastrointestinal bleeding, talk with your health care provider before using these medicines. Don't take aspirin if you are younger than 18 and have a fever. Aspirin raises the chance for severe liver injury.  Follow-up care  Follow up with your health care provider, or as advised.  When to seek medical advice  Call your health care provider right away if any of these occur:  · Continued vomiting  · Frequent diarrhea (more than 5 times a day); blood (red or black color) or mucus in diarrhea  · Blood in vomit or stool  · Swollen abdomen or increasing abdominal  pain  · Weakness, dizziness, or fainting  · Unusual drowsiness or confusion  · Reduced urine output or extreme thirst  · Fever of 100.4°F (34°C) or higher  Date Last Reviewed: 5/31/2015  © 4475-3172 [employer]. [address] [place] [address]. All rights reserved. This information is not intended as a substitute for professional medical care. Always follow your healthcare professional's instructions.

## 2018-08-14 ENCOUNTER — OFFICE VISIT (OUTPATIENT)
Dept: ORTHOPEDICS | Facility: CLINIC | Age: 78
End: 2018-08-14
Payer: MEDICARE

## 2018-08-14 ENCOUNTER — TELEPHONE (OUTPATIENT)
Dept: ORTHOPEDICS | Facility: CLINIC | Age: 78
End: 2018-08-14

## 2018-08-14 ENCOUNTER — HOSPITAL ENCOUNTER (OUTPATIENT)
Dept: RADIOLOGY | Facility: HOSPITAL | Age: 78
Discharge: HOME | End: 2018-08-14
Attending: ORTHOPAEDIC SURGERY
Payer: MEDICARE

## 2018-08-14 VITALS
WEIGHT: 211 LBS | DIASTOLIC BLOOD PRESSURE: 68 MMHG | HEART RATE: 76 BPM | SYSTOLIC BLOOD PRESSURE: 127 MMHG | HEIGHT: 75 IN | BODY MASS INDEX: 26.24 KG/M2

## 2018-08-14 DIAGNOSIS — M25.552 PAIN OF LEFT HIP JOINT: ICD-10-CM

## 2018-08-14 DIAGNOSIS — M25.552 LEFT HIP PAIN: Primary | ICD-10-CM

## 2018-08-14 DIAGNOSIS — D72.819 LEUKOPENIA, UNSPECIFIED TYPE: ICD-10-CM

## 2018-08-14 PROCEDURE — 3074F SYST BP LT 130 MM HG: CPT | Mod: CPTII,S$GLB,, | Performed by: ORTHOPAEDIC SURGERY

## 2018-08-14 PROCEDURE — 99215 OFFICE O/P EST HI 40 MIN: CPT | Mod: S$GLB,,, | Performed by: ORTHOPAEDIC SURGERY

## 2018-08-14 PROCEDURE — 99999 PR PBB SHADOW E&M-EST. PATIENT-LVL V: CPT | Mod: PBBFAC,,, | Performed by: ORTHOPAEDIC SURGERY

## 2018-08-14 PROCEDURE — 73502 X-RAY EXAM HIP UNI 2-3 VIEWS: CPT | Mod: TC,FY,PO,LT

## 2018-08-14 PROCEDURE — 3078F DIAST BP <80 MM HG: CPT | Mod: CPTII,S$GLB,, | Performed by: ORTHOPAEDIC SURGERY

## 2018-08-14 PROCEDURE — 73502 X-RAY EXAM HIP UNI 2-3 VIEWS: CPT | Mod: 26,LT,, | Performed by: RADIOLOGY

## 2018-08-14 NOTE — PROGRESS NOTES
CC: This is a 77-year-old male that complains of an radiating down the left thigh down to the foot.  The patient is status post left total hip replacement. He indicates that upon receiving the epidural steroid injections in his lumbar spine he did have one day of relief.  However, the pain returned the next day when he placed his back in certain positions.    HPI: The patient has been treated with walker assisted ambulation.  Patient states that the pain level is a 6/10.    PMH:    Past Medical History:   Diagnosis Date    Anemia due to unknown mechanism 11/10/2015    Angina pectoris 2014    not since    Arthritis     Back pain     Coronary artery disease     Diabetes mellitus 20 years     am 09/29/2014    Diabetes mellitus type II 1994     am 12/22/2015    DM (diabetes mellitus) 1994    BS 80 am 01/10/2017    DM (diabetes mellitus) 1994     01/22/2018    Gout 12/05/2017    right foot     Hyperlipemia     Hypertension     CHARLES (obstructive sleep apnea)     Spinal cord disease     Trouble in sleeping     Type 2 diabetes mellitus with diabetic polyneuropathy, without long-term current use of insulin     Uses hearing aid     bilat       PSH:    Past Surgical History:   Procedure Laterality Date    BACK SURGERY      HERNIA REPAIR Bilateral 04/18/2017    JOINT REPLACEMENT Left 10/09/2017    L YAHIR Dr. Alcocer    LAMINECTOMY  07/22/2016    left elbow  10/2017    procedure to remove gout    lumbar foraminotomy  03/2018    ROTATOR CUFF REPAIR Left 2006    SHOULDER ARTHROSCOPY W/ ROTATOR CUFF REPAIR Right 2009       Family Hx:    Family History   Problem Relation Age of Onset    Hypertension Mother     Heart disease Mother     Diabetes Mother     Hypertension Father     Heart disease Father     Diabetes Father     Stroke Father     Diabetes Sister     Cancer Brother 50        pancreas    Drug abuse Brother     Prostate cancer Neg Hx     Macular degeneration Neg Hx      Retinal detachment Neg Hx     Strabismus Neg Hx     Glaucoma Neg Hx     Blindness Neg Hx     Amblyopia Neg Hx     Kidney disease Neg Hx     Mental illness Neg Hx     Mental retardation Neg Hx     COPD Neg Hx     Asthma Neg Hx        Allergy:    Review of patient's allergies indicates:   Allergen Reactions    Sulfa (sulfonamide antibiotics)      Can't recall from 1995       Medication:    Current Outpatient Medications:     acetaminophen (TYLENOL) 650 MG TbSR, Take 1,300 mg by mouth every 8 (eight) hours., Disp: , Rfl:     allopurinol (ZYLOPRIM) 100 MG tablet, Take 1 tablet (100 mg total) by mouth once daily., Disp: 90 tablet, Rfl: 4    aspirin (ECOTRIN) 81 MG EC tablet, Take 1 tablet (81 mg total) by mouth once daily., Disp: , Rfl: 0    blood sugar diagnostic (ACCU-CHEK FRANKO PLUS TEST STRP) Strp, USE  1  STRIP ONE TIME DAILY, Disp: 100 strip, Rfl: 4    blood-glucose meter (ACCU-CHEK FRANKO PLUS METER) Misc, 1 Device by Misc.(Non-Drug; Combo Route) route once daily., Disp: 1 each, Rfl: 0    celecoxib (CELEBREX) 200 MG capsule, TAKE 1 CAPSULE(200 MG) BY MOUTH EVERY DAY, Disp: 90 capsule, Rfl: 0    cetirizine (ZYRTEC) 10 MG tablet, Take 1 tablet (10 mg total) by mouth once daily., Disp: 30 tablet, Rfl: 0    clonazePAM (KLONOPIN) 0.25 MG TbDL, DISSOLVE 2 TABLETS BY MOUTH NIGHTLY, Disp: 60 tablet, Rfl: 0    clonazePAM (KLONOPIN) 0.25 MG TbDL, DISOLVE 2 TABLETS BY MOUTH EVERY NIGHT AT BEDTIME, Disp: 90 tablet, Rfl: 0    colchicine 0.6 mg tablet, Take 2 tablets once, then 1 tablet in 1 hour., Disp: 6 tablet, Rfl: 0    diphth,pertus,acell,,tetanus (BOOSTRIX) 2.5-8-5 Lf-mcg-Lf/0.5mL Syrg injection, Inject into the muscle., Disp: 0.5 mL, Rfl: 0    docusate sodium (COLACE) 100 MG capsule, Take 1 capsule (100 mg total) by mouth 2 (two) times daily as needed., Disp: 60 capsule, Rfl: 0    ferrous sulfate 324 mg (65 mg iron) TbEC, Take 325 mg by mouth once daily., Disp: , Rfl:     fluticasone (FLONASE) 50  mcg/actuation nasal spray, 2 sprays (100 mcg total) by Each Nare route once daily., Disp: 1 Bottle, Rfl: 0    gabapentin (NEURONTIN) 300 MG capsule, Take 300 mg by mouth 2 (two) times daily., Disp: , Rfl:     glipiZIDE (GLUCOTROL) 5 MG tablet, TAKE 2 TABLETS (10 MG) TWO TIMES DAILY BEFORE MEALS, Disp: 360 tablet, Rfl: 4    lancets (ACCU-CHEK SOFTCLIX LANCETS) Misc, 1 lancet by Misc.(Non-Drug; Combo Route) route once daily., Disp: 100 each, Rfl: 2    losartan-hydrochlorothiazide 50-12.5 mg (HYZAAR) 50-12.5 mg per tablet, TAKE 1 TABLET ONE TIME DAILY, Disp: 90 tablet, Rfl: 4    metformin (GLUCOPHAGE) 1000 MG tablet, TAKE 1 TABLET TWICE DAILY WITH MEALS, Disp: 180 tablet, Rfl: 4    metoprolol succinate (TOPROL-XL) 50 MG 24 hr tablet, TAKE 1 TABLET EVERY DAY, Disp: 90 tablet, Rfl: 3    multivitamin (ONE DAILY MULTIVITAMIN) per tablet, Take 1 tablet by mouth once daily., Disp: , Rfl:     pravastatin (PRAVACHOL) 40 MG tablet, TAKE 1 TABLET EVERY DAY, Disp: 90 tablet, Rfl: 4    sildenafil (VIAGRA) 100 MG tablet, Take 1 tablet (100 mg total) by mouth as needed. Take on an empty stomach, Disp: 6 tablet, Rfl: 11    tamsulosin (FLOMAX) 0.4 mg Cp24, Take 1 capsule (0.4 mg total) by mouth once daily., Disp: 30 capsule, Rfl: 11    zonisamide (ZONEGRAN) 50 MG Cap, Take by mouth 2 (two) times daily., Disp: , Rfl:     Social History:    Social History     Socioeconomic History    Marital status:      Spouse name: Not on file    Number of children: 0    Years of education: Not on file    Highest education level: Not on file   Social Needs    Financial resource strain: Not on file    Food insecurity - worry: Not on file    Food insecurity - inability: Not on file    Transportation needs - medical: Not on file    Transportation needs - non-medical: Not on file   Occupational History    Occupation: retired     Comment: teacher   Tobacco Use    Smoking status: Never Smoker    Smokeless tobacco: Never Used  "  Substance and Sexual Activity    Alcohol use: Yes     Alcohol/week: 0.6 oz     Types: 1 Glasses of wine per week     Comment: rarely  No alcohol prior to surgery    Drug use: No    Sexual activity: Yes     Partners: Female     Birth control/protection: Condom   Other Topics Concern    Not on file   Social History Narrative    Single lives alone. No children. Retired but some part time work - 4 hours a day. 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 Caffeine intake - sugar free cola, Rare coffee intake. Still drives. Does have a Living Will . Has a nephew Jt Gayle, lives in [place]. Has a friend Karina Rossi, locally who could help him.        Vitals:   /68   Pulse 76   Ht 6' 3" (1.905 m)   Wt 95.7 kg (211 lb)   BMI 26.37 kg/m²      ROS:  GENERAL: No fever, chills, fatigability or weight loss.  MUSCULOSKELETAL: See HPI    PE:  APPEARANCE: Well nourished, well developed, in no acute distress.  MUSCULOSKELETAL     Lumbar spine-decreased range of motion, symmetrical deep tendon reflexes and motor strength as well as sensory examination bilateral lower extremities.       Left   Hip   -  2 plus dorsalis pedis and posterior tibial artery pulses, light touch intact Left lower extremity.  All digits are warm. No erythema, no warmth, no drainage, no swelling, no significant tenderness.  Less than 2 seconds capillary refill all digits.           Assessment:  The patient understands that he has diabetes and is at high risk for infection.  I have explained to the patient that we will obtain and indium scan and sulfur colloid scan.  If there is a suggestion of infection I recommend that the patient undergo a resection arthroplasty with antibiotic spacer with an anterior lateral incision. The antibiotic eluting implant would be removed 6 weeks postoperatively.  The patient would receive IV antibiotics with a PICC line for 6 weeks, if there are signs of infection in the left hip. I have discussed with the patient the " fact that he has had a chronic low white blood cell count.  His immune system is likely compromised, to some degree.  This increases the risk of infection.  I did indicate to the patient that the concerns are hard will failure and infection with there is a periprosthetic lucency of greater than 2 mm.           Diagnosis:              1.  Status post left total hip replacement               2.  Lumbar radiculopathy    Diagnostic Studies  MRI-No  X-Ray-No  EMG/NCV-No  Arthrogram-No  Bone Scan-indium scan with sulfur colloid  CT Scan-No  Doppler-No  ESR-No  CRP-No  CBC with Diff-No   Rheumatoid/Arthritis Panel-No      Plan:                                         1. PT-yes                                                 2.OT-no                                          3.NSAID-yes                                        4. Narcotics-no                                     5. Wound care-N/A                                 6. Rest-yes                                           7. Surgery-no , resection arthroplasty with drug-eluting implant placement along with the antibiotic beads.                                        8. BA Hose-no                                    9. Anticoagulation therapy-no               10. Elevation-no                                     11. Crutches-no                                    12. Walker-no             13. Cane no                        14. Referral- hematologist, regarding the chronic low white blood cell count.                                    15.Injection-no                            16. Splint   /    Cast   /   Cast Shoe-No              17. RICE-none            18. Follow up- 2 weeks

## 2018-08-14 NOTE — PATIENT INSTRUCTIONS
Arthralgia    Arthralgia is the term for pain in or around the joint. It is a symptom, not a disease. This pain may involve one or more joints. In some cases, the pain moves from joint to joint.  There are many causes for joint pain. These include:  · Injury  · Osteoarthritis (wearing out of the joint surface)  · Gout (inflammation of the joint due to crystals in the joint fluid)  · Infection inside the joint    · Bursitis (inflammation of the fluid-filled sacs around the joint)  · Autoimmune disorders such as rheumatoid arthritis or lupus  · Tendonitis (inflammation of chords that attach muscle to bone)  Home care  · Rest the involved joint(s) until your symptoms improve.   · You may be prescribed pain medicine. If none is prescribed, you may use acetaminophen or ibuprofen to control pain and inflammation.  Follow-up care  Follow up with your healthcare provider or as advised.  When to seek medical advice  Contact your healthcare provider right away if any of the following occurs:  · Pain, swelling, or redness of joint increases  · Pain worsens or recurs after a period of improvement  · Pain moves to other joints  · You cannot bear weight on the affected joint   · You cannot move the affected joint  · Joint appears deformed  · New rash appears  · Fever of 100.4ºF (38ºC) or higher, or as directed by your healthcare provider  Date Last Reviewed: 3/1/2017  © 2727-8035 [employer]. [address] [place] [address], [place] [address]. All rights reserved. This information is not intended as a substitute for professional medical care. Always follow your healthcare professional's instructions.

## 2018-08-20 ENCOUNTER — HOSPITAL ENCOUNTER (OUTPATIENT)
Dept: RADIOLOGY | Facility: HOSPITAL | Age: 78
Discharge: HOME | End: 2018-08-20
Attending: ORTHOPAEDIC SURGERY
Payer: MEDICARE

## 2018-08-20 DIAGNOSIS — M25.552 LEFT HIP PAIN: ICD-10-CM

## 2018-08-20 DIAGNOSIS — D72.819 LEUKOPENIA, UNSPECIFIED TYPE: ICD-10-CM

## 2018-08-20 PROCEDURE — 78806 NM INFLAMMATORY WHOLE BODY INDIUM: CPT | Mod: 26,,, | Performed by: RADIOLOGY

## 2018-08-20 PROCEDURE — A9570 INDIUM IN-111 AUTO WBC: HCPCS | Mod: PO

## 2018-08-21 ENCOUNTER — HOSPITAL ENCOUNTER (OUTPATIENT)
Dept: RADIOLOGY | Facility: HOSPITAL | Age: 78
Discharge: HOME | End: 2018-08-21
Attending: ORTHOPAEDIC SURGERY
Payer: MEDICARE

## 2018-08-21 DIAGNOSIS — M25.552 LEFT HIP PAIN: ICD-10-CM

## 2018-08-21 DIAGNOSIS — D72.819 LEUKOPENIA, UNSPECIFIED TYPE: ICD-10-CM

## 2018-08-21 PROCEDURE — 78102 BONE MARROW IMAGING LTD: CPT | Mod: 26,,, | Performed by: RADIOLOGY

## 2018-08-21 PROCEDURE — 78102 BONE MARROW IMAGING LTD: CPT | Mod: TC,PO

## 2018-08-21 PROCEDURE — A9541 TC99M SULFUR COLLOID: HCPCS | Mod: PO

## 2018-08-24 ENCOUNTER — TELEPHONE (OUTPATIENT)
Dept: ORTHOPEDICS | Facility: CLINIC | Age: 78
End: 2018-08-24

## 2018-08-24 NOTE — TELEPHONE ENCOUNTER
Called Mr. Yo several time to see has he completed the test that was ordered.  The patients voicemail was full. The patient has a F/U appointment on 8/28/18 to get the rest of his test.        Called the patient again, left a voicemail for the patient to call the office once he receives this massage.. SJ                ----- Message from Albaro Alcocer Sr., MD sent at 8/23/2018 10:21 AM CDT -----  Only if he has finished his tests.  Albaro Alcocer M.D.    ----- Message -----  From: Jennifer Rangel MA  Sent: 8/23/2018   8:51 AM  To: Albaro Alcocer Sr., MD    Mr. Shanks results are not in Epic yet.. It's marked as arrived. He has a F/U appointment for 8/28/18.. Do you still want him on the schedule for today.      ----- Message -----  From: Albaro Alcocer Sr., MD  Sent: 8/22/2018   4:57 PM  To: Carlyn Irvin Staff    I need to see this patient, tomorrow, if he has finnished his Indium and sulfur colloid scan.    Albaro Alcocer M.D.

## 2018-08-27 ENCOUNTER — TELEPHONE (OUTPATIENT)
Dept: ORTHOPEDICS | Facility: CLINIC | Age: 78
End: 2018-08-27

## 2018-08-27 NOTE — TELEPHONE ENCOUNTER
Spoke with the patient confirmed his appointment for 8/28/18 with               ----- Message from Sirena Platt sent at 8/27/2018  8:01 AM CDT -----  Contact: Patient  Patient returned call. He can be contacted at [phone].    Thanks,  Sirena

## 2018-08-28 ENCOUNTER — OFFICE VISIT (OUTPATIENT)
Dept: ORTHOPEDICS | Facility: CLINIC | Age: 78
End: 2018-08-28
Payer: MEDICARE

## 2018-08-28 VITALS
DIASTOLIC BLOOD PRESSURE: 73 MMHG | SYSTOLIC BLOOD PRESSURE: 141 MMHG | HEIGHT: 75 IN | HEART RATE: 69 BPM | BODY MASS INDEX: 26.24 KG/M2 | WEIGHT: 211 LBS

## 2018-08-28 DIAGNOSIS — M25.552 LEFT HIP PAIN: ICD-10-CM

## 2018-08-28 DIAGNOSIS — Z01.818 PREOP TESTING: Primary | ICD-10-CM

## 2018-08-28 PROCEDURE — 3078F DIAST BP <80 MM HG: CPT | Mod: CPTII,S$GLB,, | Performed by: ORTHOPAEDIC SURGERY

## 2018-08-28 PROCEDURE — 99499 UNLISTED E&M SERVICE: CPT | Mod: HCNC,S$GLB,, | Performed by: ORTHOPAEDIC SURGERY

## 2018-08-28 PROCEDURE — 99999 PR PBB SHADOW E&M-EST. PATIENT-LVL III: CPT | Mod: PBBFAC,,, | Performed by: ORTHOPAEDIC SURGERY

## 2018-08-28 PROCEDURE — 3077F SYST BP >= 140 MM HG: CPT | Mod: CPTII,S$GLB,, | Performed by: ORTHOPAEDIC SURGERY

## 2018-08-28 PROCEDURE — 99214 OFFICE O/P EST MOD 30 MIN: CPT | Mod: S$GLB,,, | Performed by: ORTHOPAEDIC SURGERY

## 2018-08-28 NOTE — PATIENT INSTRUCTIONS
Total Hip Replacement  Total hip replacement surgery almost always reduces joint pain. During this surgery, your problem hip joint is replaced with an artificial joint, called a prosthesis.  Benefits of hip replacement  Total hip replacement surgery almost always:  · Stops or greatly reduces hip pain. Even the pain from surgery should go away within weeks.  · Increases leg function. Without hip pain, youll be able to use your legs more. This will build up your muscles.  · Improves quality of life by allowing you to do daily tasks and low-impact activities in greater comfort.  · Provides years of easier movement. Most total hip replacements last for many years.  Your surgical experience  You will most likely arrive at the hospital on the morning of surgery. In many cases, pre-op tests are done days or even weeks ahead of time. Follow all of your surgeons instructions on preparing for surgery. When you arrive, youll be given forms to fill out. You will also talk with the anesthesiologist,  the doctor who gives the anesthesia, if you havent done so already.  Preparing for surgery  Follow any directions you are given for taking medicines or for not eating or drinking before surgery. You may need to stop certain medicines several days or weeks before the surgery. At the hospital your temperature, pulse, breathing, and blood pressure will be checked. An IV (intravenous) line will be started to provide fluids and medicines needed during surgery.    The surgical procedure  When the surgical team is ready, youll be taken to the operating room. There youll be given anesthesia. The anesthesia will help you sleep through surgery, or it will make you numb from the waist down. Then an incision is made, giving the surgeon access to your hip joint. The damaged ball is removed, and the socket is prepared to hold the prosthesis. After the new joint is in place, the incision is closed with staples or stitches.  Preparing the  bone  The hip is a ball-and-socket joint. The ball is cut from the thighbone, and the surface of the old socket is smoothed. Then the new socket is put into the pelvis. The socket is usually press-fit and may be held in place with screws. A press-fit prosthesis has tiny pores on its surface that your bone will grow into. Cement or press-fit may be used to hold the ball-and-stem portion of the hip replacement.    Joining the new parts  The new hip stem is inserted into the upper portion of your thighbone. After the stem is secure in the thighbone, the new ball and socket are joined. The stem of the prosthesis may be held with cement or press-fit. Your surgeon will choose the method that is best for you.  In the recovery room  After surgery youll be sent to the recovery room, also called the PACU (postanesthesia care unit). Your condition will be watched closely, and youll be given pain medications. You may have a catheter (small tube) in your bladder and a drain in your hip. To keep your new joint stable, a foam wedge or pillows may be placed between your legs. In some cases, a brace is used.  Risks and complications  As with any surgery, hip replacement has possible risks and complications. These include the following:  · Reaction to the anesthesia  · Blood clots  · Infection  · Dislocation of the joint or loosening of the prosthesis  · Fracture  · Wearing out the prosthetic  · Damage to nearby blood vessels, bones, or nerves  · Thigh pain   Date Last Reviewed: 8/28/2015  © 8934-6730 [employer]. [address] [place] [address], [place] [address]. All rights reserved. This information is not intended as a substitute for professional medical care. Always follow your healthcare professional's instructions.

## 2018-08-28 NOTE — H&P (VIEW-ONLY)
CC: This is a 77-year-old male that complains of an radiating down the left thigh down to the foot.  The patient is status post left total hip replacement. He indicates that upon receiving the epidural steroid injections in his lumbar spine he did have one day of relief.  However, the pain returned the next day when he placed his back in certain positions.    HPI: The patient has been treated with walker assisted ambulation.  Patient states that the pain level is a 6/10.  The patient states that he has undergone back surgery with Dr. Iqbal.  He indicates that he underwent back surgery and did not have a back problem.    PMH:    Past Medical History:   Diagnosis Date    Anemia due to unknown mechanism 11/10/2015    Angina pectoris 2014    not since    Arthritis     Back pain     Coronary artery disease     Diabetes mellitus 20 years     am 09/29/2014    Diabetes mellitus type II 1994     am 12/22/2015    DM (diabetes mellitus) 1994    BS 78 am 01/10/2017    DM (diabetes mellitus) 1994     01/22/2018    Gout 12/05/2017    right foot     Hyperlipemia     Hypertension     CHARLES (obstructive sleep apnea)     Spinal cord disease     Trouble in sleeping     Type 2 diabetes mellitus with diabetic polyneuropathy, without long-term current use of insulin     Uses hearing aid     bilat       PSH:    Past Surgical History:   Procedure Laterality Date    BACK SURGERY      HERNIA REPAIR Bilateral 04/18/2017    JOINT REPLACEMENT Left 10/09/2017    L YAHIR Dr. Alcocer    LAMINECTOMY  07/22/2016    left elbow  10/2017    procedure to remove gout    lumbar foraminotomy  03/2018    ROTATOR CUFF REPAIR Left 2006    SHOULDER ARTHROSCOPY W/ ROTATOR CUFF REPAIR Right 2009       Family Hx:    Family History   Problem Relation Age of Onset    Hypertension Mother     Heart disease Mother     Diabetes Mother     Hypertension Father     Heart disease Father     Diabetes Father     Stroke Father      Diabetes Sister     Cancer Brother 50        pancreas    Drug abuse Brother     Prostate cancer Neg Hx     Macular degeneration Neg Hx     Retinal detachment Neg Hx     Strabismus Neg Hx     Glaucoma Neg Hx     Blindness Neg Hx     Amblyopia Neg Hx     Kidney disease Neg Hx     Mental illness Neg Hx     Mental retardation Neg Hx     COPD Neg Hx     Asthma Neg Hx        Allergy:    Review of patient's allergies indicates:   Allergen Reactions    Sulfa (sulfonamide antibiotics)      Can't recall from 1995       Medication:    Current Outpatient Medications:     acetaminophen (TYLENOL) 650 MG TbSR, Take 1,300 mg by mouth every 8 (eight) hours., Disp: , Rfl:     allopurinol (ZYLOPRIM) 100 MG tablet, Take 1 tablet (100 mg total) by mouth once daily., Disp: 90 tablet, Rfl: 4    aspirin (ECOTRIN) 81 MG EC tablet, Take 1 tablet (81 mg total) by mouth once daily., Disp: , Rfl: 0    blood sugar diagnostic (ACCU-CHEK FRANKO PLUS TEST STRP) Strp, USE  1  STRIP ONE TIME DAILY, Disp: 100 strip, Rfl: 4    blood-glucose meter (ACCU-CHEK FRANKO PLUS METER) Misc, 1 Device by Misc.(Non-Drug; Combo Route) route once daily., Disp: 1 each, Rfl: 0    celecoxib (CELEBREX) 200 MG capsule, TAKE 1 CAPSULE(200 MG) BY MOUTH EVERY DAY, Disp: 90 capsule, Rfl: 0    cetirizine (ZYRTEC) 10 MG tablet, Take 1 tablet (10 mg total) by mouth once daily., Disp: 30 tablet, Rfl: 0    clonazePAM (KLONOPIN) 0.25 MG TbDL, DISSOLVE 2 TABLETS BY MOUTH NIGHTLY, Disp: 60 tablet, Rfl: 0    clonazePAM (KLONOPIN) 0.25 MG TbDL, DISOLVE 2 TABLETS BY MOUTH EVERY NIGHT AT BEDTIME, Disp: 90 tablet, Rfl: 0    colchicine 0.6 mg tablet, Take 2 tablets once, then 1 tablet in 1 hour., Disp: 6 tablet, Rfl: 0    diphth,pertus,acell,,tetanus (BOOSTRIX) 2.5-8-5 Lf-mcg-Lf/0.5mL Syrg injection, Inject into the muscle., Disp: 0.5 mL, Rfl: 0    docusate sodium (COLACE) 100 MG capsule, Take 1 capsule (100 mg total) by mouth 2 (two) times daily as needed.,  Disp: 60 capsule, Rfl: 0    ferrous sulfate 324 mg (65 mg iron) TbEC, Take 325 mg by mouth once daily., Disp: , Rfl:     fluticasone (FLONASE) 50 mcg/actuation nasal spray, 2 sprays (100 mcg total) by Each Nare route once daily., Disp: 1 Bottle, Rfl: 0    gabapentin (NEURONTIN) 300 MG capsule, Take 300 mg by mouth 2 (two) times daily., Disp: , Rfl:     glipiZIDE (GLUCOTROL) 5 MG tablet, TAKE 2 TABLETS (10 MG) TWO TIMES DAILY BEFORE MEALS, Disp: 360 tablet, Rfl: 4    lancets (ACCU-CHEK SOFTCLIX LANCETS) Misc, 1 lancet by Misc.(Non-Drug; Combo Route) route once daily., Disp: 100 each, Rfl: 2    losartan-hydrochlorothiazide 50-12.5 mg (HYZAAR) 50-12.5 mg per tablet, TAKE 1 TABLET ONE TIME DAILY, Disp: 90 tablet, Rfl: 4    metformin (GLUCOPHAGE) 1000 MG tablet, TAKE 1 TABLET TWICE DAILY WITH MEALS, Disp: 180 tablet, Rfl: 4    metoprolol succinate (TOPROL-XL) 50 MG 24 hr tablet, TAKE 1 TABLET EVERY DAY, Disp: 90 tablet, Rfl: 3    multivitamin (ONE DAILY MULTIVITAMIN) per tablet, Take 1 tablet by mouth once daily., Disp: , Rfl:     pravastatin (PRAVACHOL) 40 MG tablet, TAKE 1 TABLET EVERY DAY, Disp: 90 tablet, Rfl: 4    sildenafil (VIAGRA) 100 MG tablet, Take 1 tablet (100 mg total) by mouth as needed. Take on an empty stomach, Disp: 6 tablet, Rfl: 11    tamsulosin (FLOMAX) 0.4 mg Cp24, Take 1 capsule (0.4 mg total) by mouth once daily., Disp: 30 capsule, Rfl: 11    zonisamide (ZONEGRAN) 50 MG Cap, Take by mouth 2 (two) times daily., Disp: , Rfl:     Social History:    Social History     Socioeconomic History    Marital status:      Spouse name: Not on file    Number of children: 0    Years of education: Not on file    Highest education level: Not on file   Social Needs    Financial resource strain: Not on file    Food insecurity - worry: Not on file    Food insecurity - inability: Not on file    Transportation needs - medical: Not on file    Transportation needs - non-medical: Not on file  "  Occupational History    Occupation: retired     Comment: teacher   Tobacco Use    Smoking status: Never Smoker    Smokeless tobacco: Never Used   Substance and Sexual Activity    Alcohol use: Yes     Alcohol/week: 0.6 oz     Types: 1 Glasses of wine per week     Comment: rarely  No alcohol prior to surgery    Drug use: No    Sexual activity: Yes     Partners: Female     Birth control/protection: Condom   Other Topics Concern    Not on file   Social History Narrative    Single lives alone. No children. 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 Caffeine intake - sugar free cola, Rare coffee intake. Still drives. Does have a Living Will . Has a nephew Jt Gayle, lives in [place]. Has a friend Karina Rossi, locally who could help him.        Vitals:   BP (!) 141/73   Pulse 69   Ht 6' 3" (1.905 m)   Wt 95.7 kg (211 lb)   BMI 26.37 kg/m²      ROS:  GENERAL: No fever, chills, fatigability or weight loss.  MUSCULOSKELETAL: See HPI    PE:  APPEARANCE: Well nourished, well developed, in no acute distress.  MUSCULOSKELETAL     Lumbar spine-decreased range of motion, symmetrical deep tendon reflexes and motor strength as well as sensory examination bilateral lower extremities.       Left   Hip   -  2 plus dorsalis pedis and posterior tibial artery pulses, light touch intact Left lower extremity.  All digits are warm. No erythema, no warmth, no drainage, no swelling, no significant tenderness.  Less than 2 seconds capillary refill all digits.           Assessment:  The patient understands that he has diabetes and is at high risk for infection.  I have explained to the patient  the results of the indium scan.  The patient understands that the sedimentation rate is slightly elevated. Patient understands that he does have a periostitis at the level of the proximal femur. This could be indicative of an infection, even in the presence of negative indium scan.   Because the " patient continues to experience pain in the hip I recommend that he  undergo a resection arthroplasty with antibiotic spacer with an anterior lateral incision. The antibiotic eluting implant would be removed 6 weeks postoperatively.  The patient would receive IV antibiotics with a PICC line for 6 weeks, if there are signs of infection in the left hip. I have discussed with the patient the fact that he has had a chronic low white blood cell count.  His immune system is likely compromised, to some degree.  This increases the risk of infection.  I did indicate to the patient that the concerns are hard will failure and infection because there is a periprosthetic lucency of greater than 2 mm.  Patient understands that he can continue to have hip pain related to his lumbar radiculopathy, after undergoing a resection  arthroplasty and a delayed total hip revision.  The patient understands that he can see the infectious disease specialists and choose to undergo antibiotic treatment only, if there is a concern for an occult infection.  The patient understands that he can seek a 2nd opinion, regarding his care.   Patient and his family member indicated that he would like to proceed with surgery. The nurse, Marlyn, was present during the visit.           Diagnosis:              1.  Status post left total hip replacement               2.  Lumbar radiculopathy    Diagnostic Studies  MRI-No  X-Ray-No  EMG/NCV-No  Arthrogram-No  Bone Scan-indium scan with sulfur colloid  CT Scan-No  Doppler-No  ESR-No  CRP-No  CBC with Diff-No   Rheumatoid/Arthritis Panel-No      Plan:                                         1. PT-yes                                                 2.OT-no                                          3.NSAID-yes                                        4. Narcotics-no                                     5. Wound care-N/A                                 6. Rest-yes                                           7. Surgery-no ,  resection arthroplasty with drug-eluting implant placement along with the antibiotic beads.                                        8. BA Hose-no                                    9. Anticoagulation therapy-no               10. Elevation-no                                     11. Crutches-no                                    12. Walker-no             13. Cane no                        14. Referral- hematologist, regarding the chronic low white blood cell count.                                    15.Injection-no                            16. Splint   /    Cast   /   Cast Shoe-No              17. RICE-none            18. Follow up- 2 weeks

## 2018-08-28 NOTE — PROGRESS NOTES
CC: This is a 77-year-old male that complains of an radiating down the left thigh down to the foot.  The patient is status post left total hip replacement. He indicates that upon receiving the epidural steroid injections in his lumbar spine he did have one day of relief.  However, the pain returned the next day when he placed his back in certain positions.    HPI: The patient has been treated with walker assisted ambulation.  Patient states that the pain level is a 6/10.  The patient states that he has undergone back surgery with Dr. Iqbal.  He indicates that he underwent back surgery and did not have a back problem.    PMH:    Past Medical History:   Diagnosis Date    Anemia due to unknown mechanism 11/10/2015    Angina pectoris 2014    not since    Arthritis     Back pain     Coronary artery disease     Diabetes mellitus 20 years     am 09/29/2014    Diabetes mellitus type II 1994     am 12/22/2015    DM (diabetes mellitus) 1994    BS 78 am 01/10/2017    DM (diabetes mellitus) 1994     01/22/2018    Gout 12/05/2017    right foot     Hyperlipemia     Hypertension     CHARLES (obstructive sleep apnea)     Spinal cord disease     Trouble in sleeping     Type 2 diabetes mellitus with diabetic polyneuropathy, without long-term current use of insulin     Uses hearing aid     bilat       PSH:    Past Surgical History:   Procedure Laterality Date    BACK SURGERY      HERNIA REPAIR Bilateral 04/18/2017    JOINT REPLACEMENT Left 10/09/2017    L Y[facility]IR Dr. Alcocer    LAMINECTOMY  07/22/2016    left elbow  10/2017    procedure to remove gout    lumbar foraminotomy  03/2018    ROTATOR CUFF REPAIR Left 2006    SHOULDER ARTHROSCOPY W/ ROTATOR CUFF REPAIR Right 2009       Family Hx:    Family History   Problem Relation Age of Onset    Hypertension Mother     Heart disease Mother     Diabetes Mother     Hypertension Father     Heart disease Father     Diabetes Father     Stroke Father      Diabetes Sister     Cancer Brother 50        pancreas    Drug abuse Brother     Prostate cancer Neg Hx     Macular degeneration Neg Hx     Retinal detachment Neg Hx     Strabismus Neg Hx     Glaucoma Neg Hx     Blindness Neg Hx     Amblyopia Neg Hx     Kidney disease Neg Hx     Mental illness Neg Hx     Mental retardation Neg Hx     COPD Neg Hx     Asthma Neg Hx        Allergy:    Review of patient's allergies indicates:   Allergen Reactions    Sulfa (sulfonamide antibiotics)      Can't recall from 1995       Medication:    Current Outpatient Medications:     acetaminophen (TYLENOL) 650 MG TbSR, Take 1,300 mg by mouth every 8 (eight) hours., Disp: , Rfl:     allopurinol (ZYLOPRIM) 100 MG tablet, Take 1 tablet (100 mg total) by mouth once daily., Disp: 90 tablet, Rfl: 4    aspirin (ECOTRIN) 81 MG EC tablet, Take 1 tablet (81 mg total) by mouth once daily., Disp: , Rfl: 0    blood sugar diagnostic (ACCU-CHEK FRANKO PLUS TEST STRP) Strp, USE  1  STRIP ONE TIME DAILY, Disp: 100 strip, Rfl: 4    blood-glucose meter (ACCU-CHEK FRANKO PLUS METER) Misc, 1 Device by Misc.(Non-Drug; Combo Route) route once daily., Disp: 1 each, Rfl: 0    celecoxib (CELEBREX) 200 MG capsule, TAKE 1 CAPSULE(200 MG) BY MOUTH EVERY DAY, Disp: 90 capsule, Rfl: 0    cetirizine (ZYRTEC) 10 MG tablet, Take 1 tablet (10 mg total) by mouth once daily., Disp: 30 tablet, Rfl: 0    clonazePAM (KLONOPIN) 0.25 MG TbDL, DISSOLVE 2 TABLETS BY MOUTH NIGHTLY, Disp: 60 tablet, Rfl: 0    clonazePAM (KLONOPIN) 0.25 MG TbDL, DISOLVE 2 TABLETS BY MOUTH EVERY NIGHT AT BEDTIME, Disp: 90 tablet, Rfl: 0    colchicine 0.6 mg tablet, Take 2 tablets once, then 1 tablet in 1 hour., Disp: 6 tablet, Rfl: 0    diphth,pertus,acell,,tetanus (BOOSTRIX) 2.5-8-5 Lf-mcg-Lf/0.5mL Syrg injection, Inject into the muscle., Disp: 0.5 mL, Rfl: 0    docusate sodium (COLACE) 100 MG capsule, Take 1 capsule (100 mg total) by mouth 2 (two) times daily as needed.,  Disp: 60 capsule, Rfl: 0    ferrous sulfate 324 mg (65 mg iron) TbEC, Take 325 mg by mouth once daily., Disp: , Rfl:     fluticasone (FLONASE) 50 mcg/actuation nasal spray, 2 sprays (100 mcg total) by Each Nare route once daily., Disp: 1 Bottle, Rfl: 0    gabapentin (NEURONTIN) 300 MG capsule, Take 300 mg by mouth 2 (two) times daily., Disp: , Rfl:     glipiZIDE (GLUCOTROL) 5 MG tablet, TAKE 2 TABLETS (10 MG) TWO TIMES DAILY BEFORE MEALS, Disp: 360 tablet, Rfl: 4    lancets (ACCU-CHEK SOFTCLIX LANCETS) Misc, 1 lancet by Misc.(Non-Drug; Combo Route) route once daily., Disp: 100 each, Rfl: 2    losartan-hydrochlorothiazide 50-12.5 mg (HYZAAR) 50-12.5 mg per tablet, TAKE 1 TABLET ONE TIME DAILY, Disp: 90 tablet, Rfl: 4    metformin (GLUCOPHAGE) 1000 MG tablet, TAKE 1 TABLET TWICE DAILY WITH MEALS, Disp: 180 tablet, Rfl: 4    metoprolol succinate (TOPROL-XL) 50 MG 24 hr tablet, TAKE 1 TABLET EVERY DAY, Disp: 90 tablet, Rfl: 3    multivitamin (ONE DAILY MULTIVITAMIN) per tablet, Take 1 tablet by mouth once daily., Disp: , Rfl:     pravastatin (PRAVACHOL) 40 MG tablet, TAKE 1 TABLET EVERY DAY, Disp: 90 tablet, Rfl: 4    sildenafil (VIAGRA) 100 MG tablet, Take 1 tablet (100 mg total) by mouth as needed. Take on an empty stomach, Disp: 6 tablet, Rfl: 11    tamsulosin (FLOMAX) 0.4 mg Cp24, Take 1 capsule (0.4 mg total) by mouth once daily., Disp: 30 capsule, Rfl: 11    zonisamide (ZONEGRAN) 50 MG Cap, Take by mouth 2 (two) times daily., Disp: , Rfl:     Social History:    Social History     Socioeconomic History    Marital status:      Spouse name: Not on file    Number of children: 0    Years of education: Not on file    Highest education level: Not on file   Social Needs    Financial resource strain: Not on file    Food insecurity - worry: Not on file    Food insecurity - inability: Not on file    Transportation needs - medical: Not on file    Transportation needs - non-medical: Not on file  "  Occupational History    Occupation: retired     Comment: teacher   Tobacco Use    Smoking status: Never Smoker    Smokeless tobacco: Never Used   Substance and Sexual Activity    Alcohol use: Yes     Alcohol/week: 0.6 oz     Types: 1 Glasses of wine per week     Comment: rarely  No alcohol prior to surgery    Drug use: No    Sexual activity: Yes     Partners: Female     Birth control/protection: Condom   Other Topics Concern    Not on file   Social History Narrative    Single lives alone. No children. 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 Caffeine intake - sugar free cola, Rare coffee intake. Still drives. Does have a Living Will . Has a nephew Jt Gayle, lives in [place]. Has a friend Karina Rossi, locally who could help him.        Vitals:   BP (!) 141/73   Pulse 69   Ht 6' 3" (1.905 m)   Wt 95.7 kg (211 lb)   BMI 26.37 kg/m²      ROS:  GENERAL: No fever, chills, fatigability or weight loss.  MUSCULOSKELETAL: See HPI    PE:  APPEARANCE: Well nourished, well developed, in no acute distress.  MUSCULOSKELETAL     Lumbar spine-decreased range of motion, symmetrical deep tendon reflexes and motor strength as well as sensory examination bilateral lower extremities.       Left   Hip   -  2 plus dorsalis pedis and posterior tibial artery pulses, light touch intact Left lower extremity.  All digits are warm. No erythema, no warmth, no drainage, no swelling, no significant tenderness.  Less than 2 seconds capillary refill all digits.           Assessment:  The patient understands that he has diabetes and is at high risk for infection.  I have explained to the patient  the results of the indium scan.  The patient understands that the sedimentation rate is slightly elevated. Patient understands that he does have a periostitis at the level of the proximal femur. This could be indicative of an infection, even in the presence of negative indium scan.   Because the " patient continues to experience pain in the hip I recommend that he  undergo a resection arthroplasty with antibiotic spacer with an anterior lateral incision. The antibiotic eluting implant would be removed 6 weeks postoperatively.  The patient would receive IV antibiotics with a PICC line for 6 weeks, if there are signs of infection in the left hip. I have discussed with the patient the fact that he has had a chronic low white blood cell count.  His immune system is likely compromised, to some degree.  This increases the risk of infection.  I did indicate to the patient that the concerns are hard will failure and infection because there is a periprosthetic lucency of greater than 2 mm.  Patient understands that he can continue to have hip pain related to his lumbar radiculopathy, after undergoing a resection  arthroplasty and a delayed total hip revision.  The patient understands that he can see the infectious disease specialists and choose to undergo antibiotic treatment only, if there is a concern for an occult infection.  The patient understands that he can seek a 2nd opinion, regarding his care.   Patient and his family member indicated that he would like to proceed with surgery. The nurse, Marlyn, was present during the visit.           Diagnosis:              1.  Status post left total hip replacement               2.  Lumbar radiculopathy    Diagnostic Studies  MRI-No  X-Ray-No  EMG/NCV-No  Arthrogram-No  Bone Scan-indium scan with sulfur colloid  CT Scan-No  Doppler-No  ESR-No  CRP-No  CBC with Diff-No   Rheumatoid/Arthritis Panel-No      Plan:                                         1. PT-yes                                                 2.OT-no                                          3.NSAID-yes                                        4. Narcotics-no                                     5. Wound care-N/A                                 6. Rest-yes                                           7. Surgery-no ,  resection arthroplasty with drug-eluting implant placement along with the antibiotic beads.                                        8. BA Hose-no                                    9. Anticoagulation therapy-no               10. Elevation-no                                     11. Crutches-no                                    12. Walker-no             13. Cane no                        14. Referral- hematologist, regarding the chronic low white blood cell count.                                    15.Injection-no                            16. Splint   /    Cast   /   Cast Shoe-No              17. RICE-none            18. Follow up- 2 weeks

## 2018-08-30 ENCOUNTER — LAB VISIT (OUTPATIENT)
Dept: LAB | Facility: HOSPITAL | Age: 78
End: 2018-08-30
Attending: ORTHOPAEDIC SURGERY
Payer: MEDICARE

## 2018-08-30 ENCOUNTER — CLINICAL SUPPORT (OUTPATIENT)
Dept: CARDIOLOGY | Facility: CLINIC | Age: 78
End: 2018-08-30
Payer: MEDICARE

## 2018-08-30 DIAGNOSIS — Z01.818 PREOP TESTING: ICD-10-CM

## 2018-08-30 LAB
ALBUMIN SERPL BCP-MCNC: 3.5 G/DL
ALP SERPL-CCNC: 78 U/L
ALT SERPL W/O P-5'-P-CCNC: 12 U/L
ANION GAP SERPL CALC-SCNC: 12 MMOL/L
AST SERPL-CCNC: 17 U/L
BASOPHILS # BLD AUTO: 0.04 K/UL
BASOPHILS NFR BLD: 0.9 %
BILIRUB SERPL-MCNC: 0.4 MG/DL
BUN SERPL-MCNC: 20 MG/DL
CALCIUM SERPL-MCNC: 9 MG/DL
CHLORIDE SERPL-SCNC: 108 MMOL/L
CO2 SERPL-SCNC: 24 MMOL/L
CREAT SERPL-MCNC: 1.3 MG/DL
DIFFERENTIAL METHOD: ABNORMAL
EOSINOPHIL # BLD AUTO: 0.3 K/UL
EOSINOPHIL NFR BLD: 6.5 %
ERYTHROCYTE [DISTWIDTH] IN BLOOD BY AUTOMATED COUNT: 14.7 %
EST. GFR  (AFRICAN AMERICAN): >60 ML/MIN/1.73 M^2
EST. GFR  (NON AFRICAN AMERICAN): 53.4 ML/MIN/1.73 M^2
GLUCOSE SERPL-MCNC: 153 MG/DL
HCT VFR BLD AUTO: 35.7 %
HGB BLD-MCNC: 11.7 G/DL
IMM GRANULOCYTES # BLD AUTO: 0.02 K/UL
IMM GRANULOCYTES NFR BLD AUTO: 0.5 %
LYMPHOCYTES # BLD AUTO: 1.6 K/UL
LYMPHOCYTES NFR BLD: 36.2 %
MCH RBC QN AUTO: 28.6 PG
MCHC RBC AUTO-ENTMCNC: 32.8 G/DL
MCV RBC AUTO: 87 FL
MONOCYTES # BLD AUTO: 0.4 K/UL
MONOCYTES NFR BLD: 10.1 %
NEUTROPHILS # BLD AUTO: 2 K/UL
NEUTROPHILS NFR BLD: 45.8 %
NRBC BLD-RTO: 0 /100 WBC
PLATELET # BLD AUTO: 253 K/UL
PMV BLD AUTO: 10.8 FL
POTASSIUM SERPL-SCNC: 3.7 MMOL/L
PROT SERPL-MCNC: 6.6 G/DL
RBC # BLD AUTO: 4.09 M/UL
SODIUM SERPL-SCNC: 144 MMOL/L
WBC # BLD AUTO: 4.34 K/UL

## 2018-08-30 PROCEDURE — 85025 COMPLETE CBC W/AUTO DIFF WBC: CPT

## 2018-08-30 PROCEDURE — 36415 COLL VENOUS BLD VENIPUNCTURE: CPT | Mod: PO

## 2018-08-30 PROCEDURE — 93000 ELECTROCARDIOGRAM COMPLETE: CPT | Mod: S$GLB,,, | Performed by: INTERNAL MEDICINE

## 2018-08-30 PROCEDURE — 80053 COMPREHEN METABOLIC PANEL: CPT

## 2018-09-04 DIAGNOSIS — M25.552 LEFT HIP PAIN: Primary | ICD-10-CM

## 2018-09-05 RX ORDER — METFORMIN HYDROCHLORIDE 1000 MG/1
TABLET ORAL
Qty: 180 TABLET | Refills: 4 | Status: SHIPPED | OUTPATIENT
Start: 2018-09-05 | End: 2019-11-17 | Stop reason: SDUPTHER

## 2018-09-05 RX ORDER — LOSARTAN POTASSIUM AND HYDROCHLOROTHIAZIDE 12.5; 5 MG/1; MG/1
TABLET ORAL
Qty: 90 TABLET | Refills: 4 | Status: SHIPPED | OUTPATIENT
Start: 2018-09-05 | End: 2019-09-06

## 2018-09-05 NOTE — PROGRESS NOTES
Subjective:    Patient ID:  Srinath Mcknight is a 77 y.o. male who presents for follow-up of Pre-op Exam      HPI  Mr. Mcknight is a 77 year old male with a PMHx of HLP, CAD, HTN, DM2 who presents to clinic for pre-op exam. He returns today and states that he is doing ok. Denies chest pain, chest discomfort or anginal equivalents. No shortness of breath, palpitations or DAMON. Denies any exertional symptoms today. No syncope, near syncope or recent falls. Denies dizziness or lightheadedness today. Reports compliance with medications and dietary restrictions. Reports that home BP has been 100/60's over the last month, denies any associated shortness of breath, dizziness or lightheadedness. No signs of decompensated HF on exam today. He is scheduled for arthroplasty of Left Hip on 9/11/2018 per Dr. Alcocer.     Review of Systems   Constitution: Negative for weakness.   HENT: Negative for hearing loss and hoarse voice.    Eyes: Negative for visual disturbance.   Cardiovascular: Negative for chest pain, claudication, dyspnea on exertion, irregular heartbeat, leg swelling, near-syncope, orthopnea, palpitations, paroxysmal nocturnal dyspnea and syncope.   Respiratory: Negative for cough, hemoptysis, shortness of breath, sleep disturbances due to breathing, snoring and wheezing.    Endocrine: Negative for cold intolerance and heat intolerance.   Hematologic/Lymphatic: Does not bruise/bleed easily.   Skin: Negative for color change, dry skin and nail changes.   Musculoskeletal: Positive for arthritis (left hip), back pain and joint pain. Negative for myalgias.   Gastrointestinal: Negative for bloating, abdominal pain, constipation, nausea and vomiting.   Genitourinary: Negative for dysuria, flank pain, hematuria and hesitancy.   Neurological: Negative for headaches, light-headedness, loss of balance, numbness and paresthesias.   Psychiatric/Behavioral: Negative for altered mental status.   Allergic/Immunologic: Negative for  "environmental allergies.       BP (!) 102/58   Pulse 68   Ht 6' 3" (1.905 m)   Wt 98.5 kg (217 lb 2.5 oz)   BMI 27.14 kg/m²     Objective:    Physical Exam   Constitutional: He is oriented to person, place, and time. He appears well-developed and well-nourished. No distress.   HENT:   Head: Normocephalic and atraumatic.   Eyes: Pupils are equal, round, and reactive to light.   Neck: Normal range of motion and full passive range of motion without pain. Neck supple. No JVD present.   Cardiovascular: Normal rate, regular rhythm, S1 normal, S2 normal and intact distal pulses. PMI is not displaced. Exam reveals no distant heart sounds.   No murmur heard.  Pulses:       Radial pulses are 2+ on the right side, and 2+ on the left side.        Dorsalis pedis pulses are 2+ on the right side, and 2+ on the left side.   Pulmonary/Chest: Effort normal and breath sounds normal. No accessory muscle usage. No respiratory distress. He has no decreased breath sounds. He has no wheezes.   Abdominal: Soft. Bowel sounds are normal. He exhibits no distension. There is no tenderness.   Musculoskeletal: Normal range of motion. He exhibits no edema.        Right ankle: He exhibits no swelling.        Left ankle: He exhibits no swelling.   Neurological: He is alert and oriented to person, place, and time.   Skin: Skin is warm and dry. He is not diaphoretic. No cyanosis. Nails show no clubbing.   Psychiatric: He has a normal mood and affect. His speech is normal and behavior is normal. Judgment and thought content normal. Cognition and memory are normal.   Nursing note and vitals reviewed.          Chemistry        Component Value Date/Time     08/30/2018 0914    K 3.7 08/30/2018 0914     08/30/2018 0914    CO2 24 08/30/2018 0914    BUN 20 08/30/2018 0914    CREATININE 1.3 08/30/2018 0914     (H) 08/30/2018 0914        Component Value Date/Time    CALCIUM 9.0 08/30/2018 0914    ALKPHOS 78 08/30/2018 0914    AST 17 " 08/30/2018 0914    ALT 12 08/30/2018 0914    BILITOT 0.4 08/30/2018 0914    ESTGFRAFRICA >60.0 08/30/2018 0914    EGFRNONAA 53.4 (A) 08/30/2018 0914        Lab Results   Component Value Date    CHOL 136 04/12/2018    CHOL 142 03/07/2017    CHOL 138 09/06/2016     Lab Results   Component Value Date    HDL 32 (L) 04/12/2018    HDL 28 (L) 03/07/2017    HDL 29 (L) 09/06/2016     Lab Results   Component Value Date    LDLCALC 84.8 04/12/2018    LDLCALC 87.0 03/07/2017    LDLCALC 84.0 09/06/2016     Lab Results   Component Value Date    TRIG 96 04/12/2018    TRIG 135 03/07/2017    TRIG 125 09/06/2016     Lab Results   Component Value Date    CHOLHDL 23.5 04/12/2018    CHOLHDL 19.7 (L) 03/07/2017    CHOLHDL 21.0 09/06/2016     Lab Results   Component Value Date    WBC 4.34 08/30/2018    HGB 11.7 (L) 08/30/2018    HCT 35.7 (L) 08/30/2018    MCV 87 08/30/2018     08/30/2018     Wt Readings from Last 3 Encounters:   09/06/18 98.5 kg (217 lb 2.5 oz)   08/28/18 95.7 kg (211 lb)   08/14/18 95.7 kg (211 lb)     Temp Readings from Last 3 Encounters:   08/13/18 97.6 °F (36.4 °C) (Oral)   07/27/18 97.3 °F (36.3 °C) (Oral)   05/23/18 97.4 °F (36.3 °C) (Tympanic)     BP Readings from Last 3 Encounters:   09/06/18 (!) 102/58   08/28/18 (!) 141/73   08/14/18 127/68     Pulse Readings from Last 3 Encounters:   09/06/18 68   08/28/18 69   08/14/18 76   Nuclear Quantitative Functional Analysis:   LVEF: 55 %    Impression: NORMAL MYOCARDIAL PERFUSION  1. The perfusion scan is free of evidence for myocardial ischemia or injury.   2. Resting wall motion is physiologic.   3. Resting LV function is normal.   4. The ventricular volumes are normal at rest and stress.   5. The extracardiac distribution of radioactivity is normal.           This document has been electronically    SIGNED BY: Raghav Perez MD On: 09/07/2017 17:53    Assessment:       1. Essential hypertension    2. Coronary artery disease, occlusive    3. Aortic calcification     4. Combined hyperlipidemia associated with type 2 diabetes mellitus    5. Osteoarthritis of spine with radiculopathy, lumbar region    6. Chronic anemia    7. Type 2 diabetes mellitus with diabetic polyneuropathy, without long-term current use of insulin    8. CHARLES (obstructive sleep apnea)      Patient presents today for preop evaluation for left hip arthroplasty.  Denies chest pain, chest discomfort or anginal equivalents.   No shortness of breath, palpitations, or DAMON.  Compliant with medications and dietary restrictions.  No CNS complaints to suggest TIA or CVA today.  No signs of abnormal bleeding on ASA.   Plan:     Elevated periop risk of CV events for moderate risk procedure.  Good functional and exercise capacity.  No chest pain, active arrhythmia and CHF symptoms.  Ok to proceed the scheduled surgery without further cardiac study.  OK to hold Aspirin 5 to 7 days before the procedure and resume ASAP postop.  Continue Metoprolol and Statin periop.  Avoid periop fluid overloaded.    Follow up with Dr. DICKSON in 6 months   Continue same CV meds for now  DASH diet, 2 gm sodium restriction  1-2 L fluid restriction per day  Encourage physical activity as tolerated.

## 2018-09-06 ENCOUNTER — OFFICE VISIT (OUTPATIENT)
Dept: CARDIOLOGY | Facility: CLINIC | Age: 78
End: 2018-09-06
Payer: MEDICARE

## 2018-09-06 VITALS
DIASTOLIC BLOOD PRESSURE: 58 MMHG | WEIGHT: 217.13 LBS | BODY MASS INDEX: 27 KG/M2 | SYSTOLIC BLOOD PRESSURE: 102 MMHG | HEIGHT: 75 IN | HEART RATE: 68 BPM

## 2018-09-06 DIAGNOSIS — G47.33 OSA (OBSTRUCTIVE SLEEP APNEA): Chronic | ICD-10-CM

## 2018-09-06 DIAGNOSIS — E11.42 TYPE 2 DIABETES MELLITUS WITH DIABETIC POLYNEUROPATHY, WITHOUT LONG-TERM CURRENT USE OF INSULIN: Chronic | ICD-10-CM

## 2018-09-06 DIAGNOSIS — E78.2 COMBINED HYPERLIPIDEMIA ASSOCIATED WITH TYPE 2 DIABETES MELLITUS: Chronic | ICD-10-CM

## 2018-09-06 DIAGNOSIS — E11.69 COMBINED HYPERLIPIDEMIA ASSOCIATED WITH TYPE 2 DIABETES MELLITUS: Chronic | ICD-10-CM

## 2018-09-06 DIAGNOSIS — M47.26 OSTEOARTHRITIS OF SPINE WITH RADICULOPATHY, LUMBAR REGION: Chronic | ICD-10-CM

## 2018-09-06 DIAGNOSIS — D64.9 CHRONIC ANEMIA: ICD-10-CM

## 2018-09-06 DIAGNOSIS — I25.10 CORONARY ARTERY DISEASE, OCCLUSIVE: ICD-10-CM

## 2018-09-06 DIAGNOSIS — I10 ESSENTIAL HYPERTENSION: Primary | Chronic | ICD-10-CM

## 2018-09-06 DIAGNOSIS — I70.0 AORTIC CALCIFICATION: Chronic | ICD-10-CM

## 2018-09-06 PROCEDURE — 3044F HG A1C LEVEL LT 7.0%: CPT | Mod: CPTII,,, | Performed by: NURSE PRACTITIONER

## 2018-09-06 PROCEDURE — 3074F SYST BP LT 130 MM HG: CPT | Mod: CPTII,,, | Performed by: NURSE PRACTITIONER

## 2018-09-06 PROCEDURE — 99999 PR PBB SHADOW E&M-EST. PATIENT-LVL IV: CPT | Mod: PBBFAC,,, | Performed by: NURSE PRACTITIONER

## 2018-09-06 PROCEDURE — 3078F DIAST BP <80 MM HG: CPT | Mod: CPTII,,, | Performed by: NURSE PRACTITIONER

## 2018-09-06 PROCEDURE — 1101F PT FALLS ASSESS-DOCD LE1/YR: CPT | Mod: CPTII,,, | Performed by: NURSE PRACTITIONER

## 2018-09-06 PROCEDURE — 99213 OFFICE O/P EST LOW 20 MIN: CPT | Mod: S$PBB,,, | Performed by: NURSE PRACTITIONER

## 2018-09-06 PROCEDURE — 99214 OFFICE O/P EST MOD 30 MIN: CPT | Mod: PBBFAC | Performed by: NURSE PRACTITIONER

## 2018-09-10 ENCOUNTER — TELEPHONE (OUTPATIENT)
Dept: ORTHOPEDICS | Facility: CLINIC | Age: 78
End: 2018-09-10

## 2018-09-10 NOTE — TELEPHONE ENCOUNTER
Pt had questions regarding surgery that Dr. Alcocer can address tomorrow before the procedure.     Pt also had a question regarding arrival time. Informed pt that he will be getting a phone call this afternoon with arrival time.     Pt verbalized understanding.

## 2018-09-10 NOTE — PRE ADMISSION SCREENING
Pre op instructions reviewed with patient per phone:    To confirm, Your surgeon has instructed you:  Surgery is scheduled 9/11/18 at 1030.      Please report to [facility] 1st floor main lobby by 0830.   Pre admit office to call later today only if arrival time changes    INSTRUCTIONS IMPORTANT!!!  ¨ Do not eat, drink, or smoke after 12 midnight-including water. OK to brush teeth, no gum, candy or mints!    ¨ Take only these medicines with a small swallow of water-morning of surgery.  Gabapentin, Metoprolol      Hold Metformin 24h prior to surgery        ____  Do not wear makeup, including mascara.  ____  No powder, lotions or creams to surgical area.  ____  Please remove all jewelry, including piercings and leave at home.  ____  No money or valuables needed. Please leave at home.  ____  Please bring identification and insurance information to hospital.  ____  If going home the same day, arrange for a ride home. You will not be able to   drive if Anesthesia was used.  ____  Children, under 12 years old, must remain in the waiting room with an adult.  They are not allowed in patient areas.  ____  Wear loose fitting clothing. Allow for dressings, bandages.  ____  Stop Aspirin, Ibuprofen, Motrin and Aleve at least 5-7 days before surgery, unless otherwise instructed by your doctor, or the nurse.   You MAY use Tylenol/acetaminophen until day of surgery.  ____  If you take diabetic medication, do not take am of surgery unless instructed by   Doctor.  ____ Stop taking any Fish Oil supplement or any Vitamins that contain Vitamin E at least 5 days prior to surgery.          Bathing Instructions-- The night before surgery and the morning prior to coming to the hospital:   -Do not shave the surgical area.   -Shower and wash your hair and body as usual with anti-bacterial  soap and shampoo.   -Rinse your hair and body completely.   -Use one packet of hibiclens to wash the surgical site (using your  hand) gently for 5 minutes.  Do not scrub you skin too hard.   -Do not use hibiclens on your head, face, or genitals.   -Do not wash with anti-bacterial soap after you use the hibiclens.   -Rinse your body thoroughly.   -Dry with clean, soft towel.  Do not use lotion, cream, deodorant, or powders on   the surgical site.    Use antibacterial soap in place of hibiclens if your surgery is on the head, face or genitals.         Surgical Site Infection    Prevention of surgical site infections:     -Keep incisions clean and dry.   -Do not soak/submerge incisions in water until completely healed.   -Do not apply lotions, powders, creams, or deodorants to site.   -Always make sure hands are cleaned with antibacterial soap/ alcohol-based   prior to touching the surgical site.  (This includes doctors, nurses, staff, and yourself.)    Signs and symptoms:   -Redness and pain around the area where you had surgery   -Drainage of cloudy fluid from your surgical wound   -Fever over 100.4  I have read or had read and explained to me, and understand the above information.

## 2018-09-10 NOTE — TELEPHONE ENCOUNTER
----- Message from Geraldine Mckee MA sent at 9/10/2018 11:29 AM CDT -----  Contact: [phone]       ----- Message -----  From: Vic Alonso  Sent: 9/10/2018  11:24 AM  To: Carlyn Irvin Staff    Pt would like to consult with the nurse about surgery procedure.  Please call back at [phone].  [facility]

## 2018-09-11 ENCOUNTER — ANESTHESIA EVENT (OUTPATIENT)
Dept: SURGERY | Facility: HOSPITAL | Age: 78
DRG: 464 | End: 2018-09-11
Payer: MEDICARE

## 2018-09-11 ENCOUNTER — ANESTHESIA (OUTPATIENT)
Dept: SURGERY | Facility: HOSPITAL | Age: 78
DRG: 464 | End: 2018-09-11
Payer: MEDICARE

## 2018-09-11 ENCOUNTER — HOSPITAL ENCOUNTER (INPATIENT)
Facility: HOSPITAL | Age: 78
LOS: 3 days | Discharge: NURSING, REHAB OR LONG-TERM CARE | DRG: 464 | End: 2018-09-14
Attending: ORTHOPAEDIC SURGERY | Admitting: ORTHOPAEDIC SURGERY
Payer: MEDICARE

## 2018-09-11 DIAGNOSIS — M25.552 LEFT HIP PAIN: ICD-10-CM

## 2018-09-11 DIAGNOSIS — G47.50 PARASOMNIA: ICD-10-CM

## 2018-09-11 DIAGNOSIS — Z96.642 STATUS POST TOTAL REPLACEMENT OF LEFT HIP: ICD-10-CM

## 2018-09-11 DIAGNOSIS — Z98.890 POST-OPERATIVE STATE: Primary | ICD-10-CM

## 2018-09-11 LAB
ABO + RH BLD: NORMAL
BLD GP AB SCN CELLS X3 SERPL QL: NORMAL
CRP SERPL-MCNC: 1.2 MG/L
ERYTHROCYTE [SEDIMENTATION RATE] IN BLOOD BY WESTERGREN METHOD: 10 MM/HR
GRAM STN SPEC: NORMAL
GRAM STN SPEC: NORMAL
HCT VFR BLD AUTO: 30.1 %
HGB BLD-MCNC: 10.2 G/DL
POCT GLUCOSE: 101 MG/DL (ref 70–110)
POCT GLUCOSE: 157 MG/DL (ref 70–110)
POCT GLUCOSE: 183 MG/DL (ref 70–110)

## 2018-09-11 PROCEDURE — 25000242 PHARM REV CODE 250 ALT 637 W/ HCPCS: Performed by: ORTHOPAEDIC SURGERY

## 2018-09-11 PROCEDURE — 87206 SMEAR FLUORESCENT/ACID STAI: CPT | Mod: 91

## 2018-09-11 PROCEDURE — 63600175 PHARM REV CODE 636 W HCPCS: Performed by: NURSE ANESTHETIST, CERTIFIED REGISTERED

## 2018-09-11 PROCEDURE — 88300 SURGICAL PATH GROSS: CPT | Mod: 26,,, | Performed by: PATHOLOGY

## 2018-09-11 PROCEDURE — 0SHB08Z INSERTION OF SPACER INTO LEFT HIP JOINT, OPEN APPROACH: ICD-10-PCS | Performed by: ORTHOPAEDIC SURGERY

## 2018-09-11 PROCEDURE — 37000009 HC ANESTHESIA EA ADD 15 MINS: Performed by: ORTHOPAEDIC SURGERY

## 2018-09-11 PROCEDURE — 0SPB0JZ REMOVAL OF SYNTHETIC SUBSTITUTE FROM LEFT HIP JOINT, OPEN APPROACH: ICD-10-PCS | Performed by: ORTHOPAEDIC SURGERY

## 2018-09-11 PROCEDURE — 87070 CULTURE OTHR SPECIMN AEROBIC: CPT

## 2018-09-11 PROCEDURE — 86140 C-REACTIVE PROTEIN: CPT

## 2018-09-11 PROCEDURE — 36000710: Performed by: ORTHOPAEDIC SURGERY

## 2018-09-11 PROCEDURE — 85014 HEMATOCRIT: CPT

## 2018-09-11 PROCEDURE — 63600175 PHARM REV CODE 636 W HCPCS: Performed by: ANESTHESIOLOGY

## 2018-09-11 PROCEDURE — 25000003 PHARM REV CODE 250: Performed by: ORTHOPAEDIC SURGERY

## 2018-09-11 PROCEDURE — 88305 TISSUE EXAM BY PATHOLOGIST: CPT | Performed by: PATHOLOGY

## 2018-09-11 PROCEDURE — 71000039 HC RECOVERY, EACH ADD'L HOUR: Performed by: ORTHOPAEDIC SURGERY

## 2018-09-11 PROCEDURE — 27138 REVISE HIP JOINT REPLACEMENT: CPT | Mod: LT,,, | Performed by: ORTHOPAEDIC SURGERY

## 2018-09-11 PROCEDURE — 27201423 OPTIME MED/SURG SUP & DEVICES STERILE SUPPLY: Performed by: ORTHOPAEDIC SURGERY

## 2018-09-11 PROCEDURE — 36000711: Performed by: ORTHOPAEDIC SURGERY

## 2018-09-11 PROCEDURE — 85018 HEMOGLOBIN: CPT

## 2018-09-11 PROCEDURE — 37000008 HC ANESTHESIA 1ST 15 MINUTES: Performed by: ORTHOPAEDIC SURGERY

## 2018-09-11 PROCEDURE — 87205 SMEAR GRAM STAIN: CPT | Mod: 59

## 2018-09-11 PROCEDURE — 36415 COLL VENOUS BLD VENIPUNCTURE: CPT

## 2018-09-11 PROCEDURE — C1776 JOINT DEVICE (IMPLANTABLE): HCPCS | Performed by: ORTHOPAEDIC SURGERY

## 2018-09-11 PROCEDURE — 63600175 PHARM REV CODE 636 W HCPCS: Performed by: ORTHOPAEDIC SURGERY

## 2018-09-11 PROCEDURE — 88305 TISSUE EXAM BY PATHOLOGIST: CPT | Mod: 26,,, | Performed by: PATHOLOGY

## 2018-09-11 PROCEDURE — 71000033 HC RECOVERY, INTIAL HOUR: Performed by: ORTHOPAEDIC SURGERY

## 2018-09-11 PROCEDURE — 85651 RBC SED RATE NONAUTOMATED: CPT

## 2018-09-11 PROCEDURE — 83036 HEMOGLOBIN GLYCOSYLATED A1C: CPT

## 2018-09-11 PROCEDURE — 87102 FUNGUS ISOLATION CULTURE: CPT | Mod: 59

## 2018-09-11 PROCEDURE — 11000001 HC ACUTE MED/SURG PRIVATE ROOM

## 2018-09-11 PROCEDURE — 87116 MYCOBACTERIA CULTURE: CPT

## 2018-09-11 PROCEDURE — 86850 RBC ANTIBODY SCREEN: CPT

## 2018-09-11 PROCEDURE — 87075 CULTR BACTERIA EXCEPT BLOOD: CPT

## 2018-09-11 PROCEDURE — 25000003 PHARM REV CODE 250: Performed by: NURSE ANESTHETIST, CERTIFIED REGISTERED

## 2018-09-11 PROCEDURE — 86920 COMPATIBILITY TEST SPIN: CPT

## 2018-09-11 PROCEDURE — 87076 CULTURE ANAEROBE IDENT EACH: CPT

## 2018-09-11 PROCEDURE — 27138 REVISE HIP JOINT REPLACEMENT: CPT | Mod: AS,LT,, | Performed by: PHYSICIAN ASSISTANT

## 2018-09-11 PROCEDURE — C9290 INJ, BUPIVACAINE LIPOSOME: HCPCS | Performed by: ORTHOPAEDIC SURGERY

## 2018-09-11 PROCEDURE — 82962 GLUCOSE BLOOD TEST: CPT | Performed by: ORTHOPAEDIC SURGERY

## 2018-09-11 DEVICE — IMPLANTABLE DEVICE: Type: IMPLANTABLE DEVICE | Site: HIP | Status: FUNCTIONAL

## 2018-09-11 DEVICE — STEM FEM SZ 5 HIGH OFFSET: Type: IMPLANTABLE DEVICE | Site: HIP | Status: FUNCTIONAL

## 2018-09-11 RX ORDER — EPHEDRINE SULFATE 50 MG/ML
INJECTION, SOLUTION INTRAVENOUS
Status: DISCONTINUED | OUTPATIENT
Start: 2018-09-11 | End: 2018-09-11

## 2018-09-11 RX ORDER — OXYCODONE HYDROCHLORIDE 5 MG/1
10 TABLET ORAL
Status: DISCONTINUED | OUTPATIENT
Start: 2018-09-11 | End: 2018-09-14 | Stop reason: HOSPADM

## 2018-09-11 RX ORDER — LIDOCAINE HCL/PF 100 MG/5ML
SYRINGE (ML) INTRAVENOUS
Status: DISCONTINUED | OUTPATIENT
Start: 2018-09-11 | End: 2018-09-11

## 2018-09-11 RX ORDER — SODIUM CHLORIDE 0.9 % (FLUSH) 0.9 %
3 SYRINGE (ML) INJECTION EVERY 8 HOURS
Status: DISCONTINUED | OUTPATIENT
Start: 2018-09-11 | End: 2018-09-11 | Stop reason: HOSPADM

## 2018-09-11 RX ORDER — ONDANSETRON 2 MG/ML
INJECTION INTRAMUSCULAR; INTRAVENOUS
Status: DISCONTINUED | OUTPATIENT
Start: 2018-09-11 | End: 2018-09-11

## 2018-09-11 RX ORDER — NEOSTIGMINE METHYLSULFATE 1 MG/ML
INJECTION, SOLUTION INTRAVENOUS
Status: DISCONTINUED | OUTPATIENT
Start: 2018-09-11 | End: 2018-09-11

## 2018-09-11 RX ORDER — FENTANYL CITRATE 50 UG/ML
INJECTION, SOLUTION INTRAMUSCULAR; INTRAVENOUS
Status: DISCONTINUED | OUTPATIENT
Start: 2018-09-11 | End: 2018-09-11

## 2018-09-11 RX ORDER — CEFAZOLIN SODIUM 1 G/3ML
INJECTION, POWDER, FOR SOLUTION INTRAMUSCULAR; INTRAVENOUS
Status: DISCONTINUED | OUTPATIENT
Start: 2018-09-11 | End: 2018-09-11 | Stop reason: HOSPADM

## 2018-09-11 RX ORDER — IBUPROFEN 200 MG
16 TABLET ORAL
Status: DISCONTINUED | OUTPATIENT
Start: 2018-09-11 | End: 2018-09-14 | Stop reason: HOSPADM

## 2018-09-11 RX ORDER — IBUPROFEN 200 MG
24 TABLET ORAL
Status: DISCONTINUED | OUTPATIENT
Start: 2018-09-11 | End: 2018-09-14 | Stop reason: HOSPADM

## 2018-09-11 RX ORDER — LOSARTAN POTASSIUM 50 MG/1
50 TABLET ORAL DAILY
Status: DISCONTINUED | OUTPATIENT
Start: 2018-09-12 | End: 2018-09-13

## 2018-09-11 RX ORDER — MORPHINE SULFATE 2 MG/ML
2 INJECTION, SOLUTION INTRAMUSCULAR; INTRAVENOUS EVERY 4 HOURS PRN
Status: DISCONTINUED | OUTPATIENT
Start: 2018-09-11 | End: 2018-09-14 | Stop reason: HOSPADM

## 2018-09-11 RX ORDER — MEPERIDINE HYDROCHLORIDE 50 MG/ML
12.5 INJECTION INTRAMUSCULAR; INTRAVENOUS; SUBCUTANEOUS ONCE AS NEEDED
Status: COMPLETED | OUTPATIENT
Start: 2018-09-11 | End: 2018-09-11

## 2018-09-11 RX ORDER — GLUCAGON 1 MG
1 KIT INJECTION
Status: DISCONTINUED | OUTPATIENT
Start: 2018-09-11 | End: 2018-09-14 | Stop reason: HOSPADM

## 2018-09-11 RX ORDER — TAMSULOSIN HYDROCHLORIDE 0.4 MG/1
0.4 CAPSULE ORAL DAILY
Status: DISCONTINUED | OUTPATIENT
Start: 2018-09-11 | End: 2018-09-14 | Stop reason: HOSPADM

## 2018-09-11 RX ORDER — HYDROCHLOROTHIAZIDE 12.5 MG/1
12.5 TABLET ORAL DAILY
Status: DISCONTINUED | OUTPATIENT
Start: 2018-09-12 | End: 2018-09-13

## 2018-09-11 RX ORDER — GLYCOPYRROLATE 0.2 MG/ML
INJECTION INTRAMUSCULAR; INTRAVENOUS
Status: DISCONTINUED | OUTPATIENT
Start: 2018-09-11 | End: 2018-09-11

## 2018-09-11 RX ORDER — CEFAZOLIN SODIUM 1 G/50ML
2 SOLUTION INTRAVENOUS
Status: DISCONTINUED | OUTPATIENT
Start: 2018-09-11 | End: 2018-09-11 | Stop reason: HOSPADM

## 2018-09-11 RX ORDER — SODIUM CHLORIDE 0.9 % (FLUSH) 0.9 %
3 SYRINGE (ML) INJECTION
Status: DISCONTINUED | OUTPATIENT
Start: 2018-09-11 | End: 2018-09-11 | Stop reason: HOSPADM

## 2018-09-11 RX ORDER — SODIUM CHLORIDE, SODIUM LACTATE, POTASSIUM CHLORIDE, CALCIUM CHLORIDE 600; 310; 30; 20 MG/100ML; MG/100ML; MG/100ML; MG/100ML
INJECTION, SOLUTION INTRAVENOUS CONTINUOUS PRN
Status: DISCONTINUED | OUTPATIENT
Start: 2018-09-11 | End: 2018-09-11

## 2018-09-11 RX ORDER — FLUTICASONE PROPIONATE 50 MCG
2 SPRAY, SUSPENSION (ML) NASAL DAILY
Status: DISCONTINUED | OUTPATIENT
Start: 2018-09-11 | End: 2018-09-14 | Stop reason: HOSPADM

## 2018-09-11 RX ORDER — BACITRACIN 50000 [IU]/1
INJECTION, POWDER, FOR SOLUTION INTRAMUSCULAR
Status: DISCONTINUED | OUTPATIENT
Start: 2018-09-11 | End: 2018-09-11 | Stop reason: HOSPADM

## 2018-09-11 RX ORDER — ROCURONIUM BROMIDE 10 MG/ML
INJECTION, SOLUTION INTRAVENOUS
Status: DISCONTINUED | OUTPATIENT
Start: 2018-09-11 | End: 2018-09-11

## 2018-09-11 RX ORDER — INSULIN ASPART 100 [IU]/ML
0-5 INJECTION, SOLUTION INTRAVENOUS; SUBCUTANEOUS
Status: DISCONTINUED | OUTPATIENT
Start: 2018-09-11 | End: 2018-09-14 | Stop reason: HOSPADM

## 2018-09-11 RX ORDER — CHLORHEXIDINE GLUCONATE ORAL RINSE 1.2 MG/ML
10 SOLUTION DENTAL
Status: DISCONTINUED | OUTPATIENT
Start: 2018-09-11 | End: 2018-09-11 | Stop reason: HOSPADM

## 2018-09-11 RX ORDER — RAMELTEON 8 MG/1
8 TABLET ORAL NIGHTLY PRN
Status: DISCONTINUED | OUTPATIENT
Start: 2018-09-11 | End: 2018-09-14 | Stop reason: HOSPADM

## 2018-09-11 RX ORDER — METOPROLOL SUCCINATE 50 MG/1
50 TABLET, EXTENDED RELEASE ORAL DAILY
Status: DISCONTINUED | OUTPATIENT
Start: 2018-09-12 | End: 2018-09-14 | Stop reason: HOSPADM

## 2018-09-11 RX ORDER — NEOMYCIN AND POLYMYXIN B SULFATES 40; 200000 MG/ML; [USP'U]/ML
SOLUTION IRRIGATION
Status: DISCONTINUED | OUTPATIENT
Start: 2018-09-11 | End: 2018-09-11 | Stop reason: HOSPADM

## 2018-09-11 RX ORDER — PROPOFOL 10 MG/ML
VIAL (ML) INTRAVENOUS
Status: DISCONTINUED | OUTPATIENT
Start: 2018-09-11 | End: 2018-09-11

## 2018-09-11 RX ORDER — GABAPENTIN 300 MG/1
300 CAPSULE ORAL 2 TIMES DAILY
Status: DISCONTINUED | OUTPATIENT
Start: 2018-09-11 | End: 2018-09-14 | Stop reason: HOSPADM

## 2018-09-11 RX ORDER — TRANEXAMIC ACID 100 MG/ML
1000 INJECTION, SOLUTION INTRAVENOUS
Status: COMPLETED | OUTPATIENT
Start: 2018-09-11 | End: 2018-09-11

## 2018-09-11 RX ORDER — ACETAMINOPHEN 10 MG/ML
1000 INJECTION, SOLUTION INTRAVENOUS ONCE
Status: COMPLETED | OUTPATIENT
Start: 2018-09-11 | End: 2018-09-11

## 2018-09-11 RX ORDER — OXYCODONE AND ACETAMINOPHEN 10; 325 MG/1; MG/1
1 TABLET ORAL EVERY 4 HOURS PRN
Qty: 90 TABLET | Refills: 0 | Status: SHIPPED | OUTPATIENT
Start: 2018-09-11 | End: 2018-09-14 | Stop reason: SDUPTHER

## 2018-09-11 RX ORDER — PRAVASTATIN SODIUM 20 MG/1
40 TABLET ORAL DAILY
Status: DISCONTINUED | OUTPATIENT
Start: 2018-09-11 | End: 2018-09-14 | Stop reason: HOSPADM

## 2018-09-11 RX ORDER — TOBRAMYCIN 1.2 G/30ML
2 INJECTION, POWDER, LYOPHILIZED, FOR SOLUTION INTRAVENOUS ONCE
Status: DISCONTINUED | OUTPATIENT
Start: 2018-09-11 | End: 2018-09-11

## 2018-09-11 RX ORDER — ZONISAMIDE 50 MG/1
50 CAPSULE ORAL 2 TIMES DAILY
Status: DISCONTINUED | OUTPATIENT
Start: 2018-09-11 | End: 2018-09-12

## 2018-09-11 RX ORDER — ACETAMINOPHEN 10 MG/ML
1000 INJECTION, SOLUTION INTRAVENOUS EVERY 6 HOURS
Status: COMPLETED | OUTPATIENT
Start: 2018-09-11 | End: 2018-09-12

## 2018-09-11 RX ORDER — ALLOPURINOL 100 MG/1
100 TABLET ORAL DAILY
Status: DISCONTINUED | OUTPATIENT
Start: 2018-09-11 | End: 2018-09-14 | Stop reason: HOSPADM

## 2018-09-11 RX ORDER — SODIUM CHLORIDE 9 MG/ML
INJECTION, SOLUTION INTRAVENOUS CONTINUOUS
Status: DISCONTINUED | OUTPATIENT
Start: 2018-09-11 | End: 2018-09-11

## 2018-09-11 RX ORDER — ONDANSETRON 8 MG/1
8 TABLET, ORALLY DISINTEGRATING ORAL EVERY 8 HOURS PRN
Status: DISCONTINUED | OUTPATIENT
Start: 2018-09-11 | End: 2018-09-14 | Stop reason: HOSPADM

## 2018-09-11 RX ORDER — CHLORHEXIDINE GLUCONATE ORAL RINSE 1.2 MG/ML
10 SOLUTION DENTAL 2 TIMES DAILY
Status: DISCONTINUED | OUTPATIENT
Start: 2018-09-11 | End: 2018-09-14 | Stop reason: HOSPADM

## 2018-09-11 RX ORDER — HYDROMORPHONE HYDROCHLORIDE 2 MG/ML
0.2 INJECTION, SOLUTION INTRAMUSCULAR; INTRAVENOUS; SUBCUTANEOUS EVERY 5 MIN PRN
Status: DISCONTINUED | OUTPATIENT
Start: 2018-09-11 | End: 2018-09-11 | Stop reason: HOSPADM

## 2018-09-11 RX ORDER — SODIUM CHLORIDE 0.9 % (FLUSH) 0.9 %
3 SYRINGE (ML) INJECTION
Status: DISCONTINUED | OUTPATIENT
Start: 2018-09-11 | End: 2018-09-14 | Stop reason: HOSPADM

## 2018-09-11 RX ORDER — LOSARTAN POTASSIUM AND HYDROCHLOROTHIAZIDE 12.5; 5 MG/1; MG/1
1 TABLET ORAL DAILY
Status: DISCONTINUED | OUTPATIENT
Start: 2018-09-12 | End: 2018-09-11 | Stop reason: RX

## 2018-09-11 RX ADMIN — EPHEDRINE SULFATE 25 MG: 50 INJECTION, SOLUTION INTRAMUSCULAR; INTRAVENOUS; SUBCUTANEOUS at 10:09

## 2018-09-11 RX ADMIN — HYDROMORPHONE HYDROCHLORIDE 0.2 MG: 2 INJECTION, SOLUTION INTRAMUSCULAR; INTRAVENOUS; SUBCUTANEOUS at 02:09

## 2018-09-11 RX ADMIN — ROBINUL 0.8 MG: 0.2 INJECTION INTRAMUSCULAR; INTRAVENOUS at 01:09

## 2018-09-11 RX ADMIN — BUPIVACAINE 266 MG: 13.3 INJECTION, SUSPENSION, LIPOSOMAL INFILTRATION at 12:09

## 2018-09-11 RX ADMIN — ROCURONIUM BROMIDE 30 MG: 10 INJECTION, SOLUTION INTRAVENOUS at 10:09

## 2018-09-11 RX ADMIN — FENTANYL CITRATE 100 MCG: 50 INJECTION, SOLUTION INTRAMUSCULAR; INTRAVENOUS at 11:09

## 2018-09-11 RX ADMIN — SODIUM CHLORIDE, SODIUM LACTATE, POTASSIUM CHLORIDE, AND CALCIUM CHLORIDE: 600; 310; 30; 20 INJECTION, SOLUTION INTRAVENOUS at 09:09

## 2018-09-11 RX ADMIN — PRAVASTATIN SODIUM 40 MG: 20 TABLET ORAL at 02:09

## 2018-09-11 RX ADMIN — MEPERIDINE HYDROCHLORIDE 12.5 MG: 50 INJECTION INTRAMUSCULAR; INTRAVENOUS; SUBCUTANEOUS at 01:09

## 2018-09-11 RX ADMIN — TRANEXAMIC ACID 1000 MG: 100 INJECTION, SOLUTION INTRAVENOUS at 09:09

## 2018-09-11 RX ADMIN — RAMELTEON 8 MG: 8 TABLET, FILM COATED ORAL at 09:09

## 2018-09-11 RX ADMIN — FLUTICASONE PROPIONATE 100 MCG: 50 SPRAY, METERED NASAL at 02:09

## 2018-09-11 RX ADMIN — PROPOFOL 150 MG: 10 INJECTION, EMULSION INTRAVENOUS at 09:09

## 2018-09-11 RX ADMIN — ROCURONIUM BROMIDE 30 MG: 10 INJECTION, SOLUTION INTRAVENOUS at 11:09

## 2018-09-11 RX ADMIN — ALLOPURINOL 100 MG: 100 TABLET ORAL at 02:09

## 2018-09-11 RX ADMIN — ACETAMINOPHEN 1000 MG: 10 INJECTION, SOLUTION INTRAVENOUS at 01:09

## 2018-09-11 RX ADMIN — GABAPENTIN 300 MG: 300 CAPSULE ORAL at 09:09

## 2018-09-11 RX ADMIN — NEOSTIGMINE METHYLSULFATE 5 MG: 1 INJECTION INTRAVENOUS at 01:09

## 2018-09-11 RX ADMIN — FENTANYL CITRATE 100 MCG: 50 INJECTION, SOLUTION INTRAMUSCULAR; INTRAVENOUS at 12:09

## 2018-09-11 RX ADMIN — ONDANSETRON 4 MG: 2 INJECTION, SOLUTION INTRAMUSCULAR; INTRAVENOUS at 12:09

## 2018-09-11 RX ADMIN — OXYCODONE HYDROCHLORIDE 10 MG: 5 TABLET ORAL at 07:09

## 2018-09-11 RX ADMIN — ROCURONIUM BROMIDE 50 MG: 10 INJECTION, SOLUTION INTRAVENOUS at 09:09

## 2018-09-11 RX ADMIN — CHLORHEXIDINE GLUCONATE 10 ML: 1.2 RINSE ORAL at 09:09

## 2018-09-11 RX ADMIN — VANCOMYCIN HYDROCHLORIDE 1500 MG: 1 INJECTION, POWDER, LYOPHILIZED, FOR SOLUTION INTRAVENOUS at 09:09

## 2018-09-11 RX ADMIN — TAMSULOSIN HYDROCHLORIDE 0.4 MG: 0.4 CAPSULE ORAL at 02:09

## 2018-09-11 RX ADMIN — ROCURONIUM BROMIDE 20 MG: 10 INJECTION, SOLUTION INTRAVENOUS at 11:09

## 2018-09-11 RX ADMIN — FENTANYL CITRATE 100 MCG: 50 INJECTION, SOLUTION INTRAMUSCULAR; INTRAVENOUS at 09:09

## 2018-09-11 RX ADMIN — CEFAZOLIN SODIUM 2 G: 1 SOLUTION INTRAVENOUS at 09:09

## 2018-09-11 RX ADMIN — ACETAMINOPHEN 1000 MG: 10 INJECTION, SOLUTION INTRAVENOUS at 05:09

## 2018-09-11 RX ADMIN — LIDOCAINE HYDROCHLORIDE 80 MG: 20 INJECTION, SOLUTION INTRAVENOUS at 09:09

## 2018-09-11 NOTE — CONSULTS
[facility] Hospital Medicine  Consult Note    Patient Name: Srinath Mcknight  MRN: [id number]  Admission Date: 9/11/2018  Hospital Length of Stay: 0 days  Attending Physician: Albaro Alcocer Sr., MD   Primary Care Provider: Yeison Wilder MD           Patient information was obtained from patient and ER records.     Consults  Subjective:     Principal Problem: Left hip pain    Chief Complaint: No chief complaint on file.       HPI: Srinath Mcknight is a 77 year old male with history of CAD, HTN, HLD, and DM2 who underwent left hip resection arthroplasty with drug-eluting implant placement along with the antibiotic beads per Dr. Alcocer.  Patient tolerated procedure well and was transferred to Med-Surg unit post-op.  Hospital Medicine was consulted for routine post-op medical management.  Currently, patient appears comfortable in NAD.  Denies any CP, palpitations, SOB, ABD pain, N/V/D, lightheadedness/dizziness, syncope, AMS, HA, focal deficits, fever, or chills. Pain controlled. VSS.           Past Medical History:   Diagnosis Date    Anemia due to unknown mechanism 11/10/2015    Angina pectoris 2014    not since    Arthritis     Back pain     Coronary artery disease     Encounter for blood transfusion     Gout 12/05/2017    right foot     Hyperlipemia     Hypertension     CHARLES (obstructive sleep apnea)     Spinal cord disease     Trouble in sleeping     Type 2 diabetes mellitus with diabetic polyneuropathy, without long-term current use of insulin     Uses hearing aid     bilat       Past Surgical History:   Procedure Laterality Date    ARTHROPLASTY-HIP (ANTERIOR APPROACH) Left 10/9/2017    [facility] OR    BACK SURGERY      BURSECTOMY - ELBOW Left 10/27/2017    Per[facility] Sr[facility] at [facility] OR    Colonoscopy N/A 4/7/2015    Performed by Vahid Romero MD at [facility] ENDO    HERNIA REPAIR Bilateral 04/18/2017    INJECTION-JOINT Left  5/18/2016    [facility]rol [facility] PAIN MGT    JOINT REPLACEMENT Left 10/09/2017    L YAHIR Dr. Alcocer    LAMINECTOMY  07/22/2016    LAMINECTOMY-LUMBAR-DECOMPRESSION - L3/4 Laminectomy and Cyst Removal L4/5 Laminectomy No Implants SNS: SSEP/EMG Open carlos + Aaron C-arm 2 hours 1st case N/A 7/22/2016    Performed by Sin Bacon [facility] at [facility] OR 2ND FLR    left elbow  10/2017    procedure to remove gout    lumbar foraminotomy  03/2018    REPAIR-HERNIA-INGUINAL-BILATERAL-LAPAROSCOPIC w/ mesh Bilateral 4/18/2017    Performed by Chauncey Ellis Jr., MD at [facility] OR 2ND FLR    ROTATOR CUFF REPAIR Left 2006    SHOULDER ARTHROSCOPY W/ ROTATOR CUFF REPAIR Right 2009    Transforaminal Epidural Steroid Injection-Left L3-4 & INJECTION-FACET-Left L3-4 Facet Joint Injection & Cyst Aspiration Left 6/14/2016    Performed by Desmond Hutchinson MD at [facility] PAIN MGT       Review of patient's allergies indicates:   Allergen Reactions    Sulfa (sulfonamide antibiotics)      Can't recall from 1995       No current facility-administered medications on file prior to encounter.      Current Outpatient Medications on File Prior to Encounter   Medication Sig    allopurinol (ZYLOPRIM) 100 MG tablet Take 1 tablet (100 mg total) by mouth once daily.    clonazePAM (KLONOPIN) 0.25 MG TbDL DISOLVE 2 TABLETS BY MOUTH EVERY NIGHT AT BEDTIME    docusate sodium (COLACE) 100 MG capsule Take 1 capsule (100 mg total) by mouth 2 (two) times daily as needed.    ferrous sulfate 324 mg (65 mg iron) TbEC Take 325 mg by mouth once daily.    fluticasone (FLONASE) 50 mcg/actuation nasal spray 2 sprays (100 mcg total) by Each Nare route once daily.    gabapentin (NEURONTIN) 300 MG capsule Take 300 mg by mouth 2 (two) times daily.    GARLIC EXTRACT ORAL Take by mouth once daily.    glipiZIDE (GLUCOTROL) 5 MG tablet TAKE 2 TABLETS (10 MG) TWO TIMES DAILY BEFORE MEALS    lancets (ACCU-CHEK SOFTCLIX LANCETS) Misc 1 lancet by  Misc.(Non-Drug; Combo Route) route once daily.    metoprolol succinate (TOPROL-XL) 50 MG 24 hr tablet TAKE 1 TABLET EVERY DAY    multivitamin (ONE DAILY MULTIVITAMIN) per tablet Take 1 tablet by mouth once daily.    pravastatin (PRAVACHOL) 40 MG tablet TAKE 1 TABLET EVERY DAY (Patient taking differently: TAKE 1 TABLET EVERY NIGHT)    tamsulosin (FLOMAX) 0.4 mg Cp24 Take 1 capsule (0.4 mg total) by mouth once daily.    zonisamide (ZONEGRAN) 50 MG Cap Take 50 mg by mouth 2 (two) times daily.     [DISCONTINUED] acetaminophen (TYLENOL) 650 MG TbSR Take 1,300 mg by mouth every 8 (eight) hours.    [DISCONTINUED] aspirin (ECOTRIN) 81 MG EC tablet Take 1 tablet (81 mg total) by mouth once daily.    [DISCONTINUED] cetirizine (ZYRTEC) 10 MG tablet Take 1 tablet (10 mg total) by mouth once daily.    blood sugar diagnostic (ACCU-CHEK FRANKO PLUS TEST STRP) Strp USE  1  STRIP ONE TIME DAILY    blood-glucose meter (ACCU-CHEK FRANKO PLUS METER) Misc 1 Device by Misc.(Non-Drug; Combo Route) route once daily.    celecoxib (CELEBREX) 200 MG capsule TAKE 1 CAPSULE(200 MG) BY MOUTH EVERY DAY    colchicine 0.6 mg tablet Take 2 tablets once, then 1 tablet in 1 hour. (Patient taking differently: Take 0.6 mg by mouth. Take 2 tablets once, then 1 tablet in 1 hour.)    diphth,pertus,acell,,tetanus (BOOSTRIX) 2.5-8-5 Lf-mcg-Lf/0.5mL Syrg injection Inject into the muscle.    sildenafil (VIAGRA) 100 MG tablet Take 1 tablet (100 mg total) by mouth as needed. Take on an empty stomach     Family History     Problem Relation (Age of Onset)    Cancer Brother (50)    Diabetes Mother, Father, Sister    Drug abuse Brother    Heart disease Mother, Father    Hypertension Mother, Father    Stroke Father        Tobacco Use    Smoking status: Never Smoker    Smokeless tobacco: Never Used   Substance and Sexual Activity    Alcohol use: Yes     Alcohol/week: 0.6 oz     Types: 1 Glasses of wine per week     Comment: rarely  No alcohol prior to  surgery    Drug use: No    Sexual activity: Yes     Partners: Female     Birth control/protection: Condom     Review of Systems   Constitutional: Negative.  Negative for activity change, appetite change, fatigue and fever.   HENT: Negative.    Eyes: Negative.    Respiratory: Negative.  Negative for cough, chest tightness and shortness of breath.    Cardiovascular: Negative.  Negative for chest pain, palpitations and leg swelling.   Gastrointestinal: Negative.  Negative for abdominal pain, diarrhea, nausea and vomiting.   Endocrine: Negative.    Genitourinary: Negative for dysuria, flank pain, frequency and urgency.   Musculoskeletal: Positive for arthralgias and gait problem.   Skin: Negative.    Allergic/Immunologic: Negative.    Neurological: Negative for dizziness, weakness, light-headedness and headaches.   Hematological: Negative.    Psychiatric/Behavioral: Negative.      Objective:     Vital Signs (Most Recent):  Temp: 98.6 °F (37 °C) (09/11/18 1444)  Pulse: 82 (09/11/18 1444)  Resp: 16 (09/11/18 1444)  BP: (!) 141/80 (09/11/18 1444)  SpO2: 95 % (09/11/18 1444) Vital Signs (24h Range):  Temp:  [97.3 °F (36.3 °C)-98.6 °F (37 °C)] 98.6 °F (37 °C)  Pulse:  [63-90] 82  Resp:  [13-19] 16  SpO2:  [95 %-100 %] 95 %  BP: (125-164)/(61-98) 141/80     Weight: 94.4 kg (208 lb 3 oz)  Body mass index is 26.02 kg/m².    Physical Exam   Constitutional: He is oriented to person, place, and time. He appears well-developed and well-nourished.   HENT:   Head: Normocephalic and atraumatic.   Eyes: EOM are normal. Pupils are equal, round, and reactive to light.   Neck: Normal range of motion. Neck supple.   Cardiovascular: Normal rate, regular rhythm, normal heart sounds, intact distal pulses and normal pulses.   No murmur heard.  Pulmonary/Chest: Effort normal and breath sounds normal. No accessory muscle usage. No tachypnea. No respiratory distress.   Abdominal: Soft. Normal appearance and bowel sounds are normal. There is no  tenderness.   Musculoskeletal: Normal range of motion.   Left hip dressing C/D/I   LLE: Neurovascularly intact    Neurological: He is alert and oriented to person, place, and time. He has normal reflexes.   Skin: Skin is warm, dry and intact. Capillary refill takes less than 2 seconds.   Psychiatric: He has a normal mood and affect. His speech is normal and behavior is normal. Judgment and thought content normal. Cognition and memory are normal.   Nursing note and vitals reviewed.      Significant Labs:   BMP: No results for input(s): GLU, NA, K, CL, CO2, BUN, CREATININE, CALCIUM, MG in the last 48 hours.  CBC:   Recent Labs   Lab  09/11/18   1339   HGB  10.2*   HCT  30.1*     CMP: No results for input(s): NA, K, CL, CO2, GLU, BUN, CREATININE, CALCIUM, PROT, ALBUMIN, BILITOT, ALKPHOS, AST, ALT, ANIONGAP, EGFRNONAA in the last 48 hours.    Invalid input(s): ESTGFAFRICA  All pertinent labs within the past 24 hours have been reviewed.    Significant Imaging:       Assessment/Plan:     * Left hip pain    S/P left hip resection arthroplasty with drug-eluting implant placement along with the antibiotic beads.                            Managed per primary team    Pain controlled   PT/OT  Eliquis for DVT prophylaxis   Social work consult for discharge planning         Essential hypertension    Resume home meds  Monitor           Coronary artery disease, occlusive    Stable   Will resume current medical management           Combined hyperlipidemia associated with type 2 diabetes mellitus    Resume statin   Accucheck and SSI low dose             VTE Risk Mitigation (From admission, onward)        Ordered     apixaban tablet 2.5 mg  2 times daily      09/11/18 1438     IP VTE HIGH RISK PATIENT  Once      09/11/18 1438     Place BA hose  Until discontinued      09/11/18 1438     Place sequential compression device  Until discontinued      09/11/18 1438     Place BA hose  Until discontinued      09/11/18 0914     Place  sequential compression device  Until discontinued      09/11/18 0914              Thank you for your consult. I will follow-up with patient. Please contact us if you have any additional questions.    Cheryl Dobson NP  Department of Hospital Medicine   [facility]

## 2018-09-11 NOTE — TRANSFER OF CARE
"Anesthesia Transfer of Care Note    Patient: Srinath Mcknight    Procedure(s) Performed: Procedure(s) (LRB):  ARTHROPLASTY-RESECTION (Left)    Patient location: PACU    Anesthesia Type: general    Transport from OR: Transported from OR on room air with adequate spontaneous ventilation    Post pain: adequate analgesia    Post assessment: no apparent anesthetic complications    Post vital signs: stable    Level of consciousness: awake    Nausea/Vomiting: no nausea/vomiting    Complications: none    Transfer of care protocol was followed      Last vitals:   Visit Vitals  /68 (BP Location: Right arm, Patient Position: Sitting)   Pulse 63   Temp 36.3 °C (97.3 °F) (Temporal)   Resp 18   Ht 6' 3" (1.905 m)   Wt 94.4 kg (208 lb 3 oz)   SpO2 98%   BMI 26.02 kg/m²     "

## 2018-09-11 NOTE — PROGRESS NOTES
The patient has been examined and the H&P has been reviewed:     I concur with the findings and patient states that no changes have occurred since H&P was written. He elects to proceed with a left hip  resection arthroplasty with drug-eluting implant placement along with the antibiotic beads.                                     Anesthesia/Surgery risks, benefits and alternative options discussed and understood by patient/family.Patient has followed all pre-op instructions. All questions have been answered.            There are no hospital problems to display for this patient.

## 2018-09-11 NOTE — OP NOTE
OPERATIVE DICTATION:    DATE OF SERVICE:09/11/2018      Preoperative Diagnosis: failed left Total hip replacement     Postoperative Diagnosis:    failed left Total hip replacement     Procedure: revision of left total hip replacement     Indication for surgery:  failed left Total hip replacement     Anesthesia:  General anesthesia    Complications:   none    Surgeon: Albaro Alcocer M.D.     Specimen:gram stain, femoral prosthesis    Assistant:QUETA Mathew. His assistance was critical and necesssary with positioning of the extremity throughout the surgical procedure.The physician assistant allowed me to access areas of the left hip that could not be possible without the assistance of a trained orthopedic physician assistant.  His assistance was critical to allowing me to provide the highest level of care to the patient.      Operative procedure:      The patient was brought to the operating room after proper consent and placed under general anesthesia with intubation.  Patient was placed in a right lateral decubitus position. The left hip was prepped with alcohol, chlorhexidine and sterilely draped.  The U-drape was used to isolate the pain you area. The time-out was performed and the correct extremity identified. An incision was made over the greater trochanter extending 4 cm proximally and 4 cm distally.  The incision carried down to the fascia judah which was incised and retracted anteriorly and posteriorly. The tensor fascia judah was split and retracted anteriorly and posteriorly.  Patient noted to have a fulminant greater trochanteric bursitis which was excised.  The anterior 3rd of the gluteus medius as well as the vastus lateralis was reflected anteriorly.  The capsulectomy was performed and the femoral prosthesis identified. The prosthesis was oriented properly with the correct anteversion. There was soft tissue attached to the periphery of the prosthesis that was excised. The curette was used to remove  a small amount of bone superior to the femoral prosthesis over the lateral side. The attachment with device was placed onto the femoral stem in with the slap hammer the femoral prosthesis was removed. Curette was used to remove the soft tissue from the canal of the femur.  There was no evidence of purulence.  There was some degree of metal oasis I was not secondary to avulsion of the metal with the soft tissue attachment.  The curette was used to remove soft tissue at the periphery of the acetabulum which was noted to be solid and intact.  The polyethylene was intact as well. G stain and cultures was submitted from the femur and the hip joint. Soft tissue from the femur and the hip joint was submitted for pathological specimen, as well. Pulse lavage was performed with tab solution. Reaming and rasping was performed up to a size 12 femoral component with the appropriate anteversion. The size +12 mm neck with a 36 mm head was placed on the femoral component. The high offset femoral component was tried.  The hip reduced and brought through range of motion.  Intraoperative x-ray showed good lengthening as well as prosthesis positioning.  The trial components removed pulse lavage thoroughly performed.  The Gram stain was negative and there was no evidence of white blood cell from the surgical field.  A decision decision was made to proceed with revising the femoral stem which was noted to be quite solid and stable following rasping and reaming.  The size 12 femoral component was placed.  The +12 mm neck with a 36 mm head was placed on the femoral side and locked in place. The hip was reduced and brought through range of motion and noted to be satisfactorily aligned and stable. The vastus lateralis and gluteus medius repaired using interrupted 1.  Vicryl sutures and reinforced with 5.  Tycron sutures. The fascia judah was then repaired using interrupted 1.  Vicryl sutures. The subcutaneous tissue post with 1.  Vicryl  sutures and a subcuticular 3 0 Prolene suture placed overlying skin reinforced with staples. Patient was injected with Exparel.  Estimated blood loss was 350 cc.  Upon repairing the skin the Betadine gauze and sponge tape applied.  The patient placed in an abduction pillow the patient tolerated procedure well and left the operating room in good condition.                   Albaro Alcocer M.D.

## 2018-09-11 NOTE — ANESTHESIA PREPROCEDURE EVALUATION
09/11/2018  Srinath Mcknight is a 77 y.o., male.    Anesthesia Evaluation    I have reviewed the Patient Summary Reports.    I have reviewed the Nursing Notes.      Review of Systems  Anesthesia Hx:  No problems with previous Anesthesia  Denies Family Hx of Anesthesia complications.   Denies Personal Hx of Anesthesia complications.   Social:  Non-Smoker, Social Alcohol Use    Hematology/Oncology:     Oncology Normal    -- Anemia:   EENT/Dental:EENT/Dental Normal   Cardiovascular:   Hypertension CAD    Denies Angina. hyperlipidemia ECG has been reviewed. Nuclear Quantitative Functional Analysis:   LVEF: 55 %    Impression: NORMAL MYOCARDIAL PERFUSION  1. The perfusion scan is free of evidence for myocardial ischemia or injury.   2. Resting wall motion is physiologic.   3. Resting LV function is normal.   4. The ventricular volumes are normal at rest and stress.   5. The extracardiac distribution of radioactivity is normal.      Pulmonary:   Sleep Apnea    Renal/:  Renal/ Normal     Hepatic/GI:  Hepatic/GI Normal    Musculoskeletal:   Arthritis   Spine Disorders: lumbar    Neurological:   Neuromuscular Disease,    Endocrine:   Diabetes    Dermatological:  Skin Normal    Psych:  Psychiatric Normal           Physical Exam  General:  Obesity    Airway/Jaw/Neck:  Airway Findings: Mouth Opening: Normal General Airway Assessment: Adult  Mallampati: II      Dental:  Dental Findings: In tact   Chest/Lungs:  Chest/Lungs Findings: Normal Respiratory Rate     Heart/Vascular:  Heart Findings: Rate: Normal  Rhythm: Regular Rhythm        Mental Status:  Mental Status Findings:  Cooperative, Alert and Oriented         Anesthesia Plan  Type of Anesthesia, risks & benefits discussed:  Anesthesia Type:  general  Patient's Preference:   Intra-op Monitoring Plan: standard ASA monitors  Intra-op Monitoring Plan Comments:   Post  Op Pain Control Plan: IV/PO Opioids PRN  Post Op Pain Control Plan Comments:   Induction:   IV  Beta Blocker:  Patient is on a Beta-Blocker and has received one dose within the past 24 hours (No further documentation required).       Informed Consent: Patient understands risks and agrees with Anesthesia plan.  Questions answered. Anesthesia consent signed with patient.  ASA Score: 3     Day of Surgery Review of History & Physical: I have interviewed and examined the patient. I have reviewed the patient's H&P dated:            Ready For Surgery From Anesthesia Perspective.

## 2018-09-11 NOTE — ANESTHESIA RELEASE NOTE
"Anesthesia Release from PACU Note    Patient: Srinath Mcknight    Procedure(s) Performed: Procedure(s) (LRB):  ARTHROPLASTY-RESECTION (Left)    Anesthesia type: general    Post pain: Adequate analgesia    Post assessment: no apparent anesthetic complications    Last Vitals:   Visit Vitals  /68 (BP Location: Right arm, Patient Position: Sitting)   Pulse 63   Temp 36.3 °C (97.3 °F) (Temporal)   Resp 18   Ht 6' 3" (1.905 m)   Wt 94.4 kg (208 lb 3 oz)   SpO2 98%   BMI 26.02 kg/m²       Post vital signs: stable    Level of consciousness: awake    Nausea/Vomiting: no nausea/no vomiting    Complications: none    Airway Patency: patent    Respiratory: unassisted    Cardiovascular: stable and blood pressure at baseline    Hydration: euvolemic  "

## 2018-09-11 NOTE — INTERVAL H&P NOTE
The patient has been examined and the H&P has been reviewed:    I concur with the findings and no changes have occurred since H&P was written.    Anesthesia/Surgery risks, benefits and alternative options discussed and understood by patient/family.          Active Hospital Problems    Diagnosis  POA    Left hip pain [M25.552]  Yes      Resolved Hospital Problems   No resolved problems to display.

## 2018-09-11 NOTE — SUBJECTIVE & OBJECTIVE
Past Medical History:   Diagnosis Date    Anemia due to unknown mechanism 11/10/2015    Angina pectoris 2014    not since    Arthritis     Back pain     Coronary artery disease     Encounter for blood transfusion     Gout 12/05/2017    right foot     Hyperlipemia     Hypertension     CHARLES (obstructive sleep apnea)     Spinal cord disease     Trouble in sleeping     Type 2 diabetes mellitus with diabetic polyneuropathy, without long-term current use of insulin     Uses hearing aid     bilat       Past Surgical History:   Procedure Laterality Date    ARTHROPLASTY-HIP (ANTERIOR APPROACH) Left 10/9/2017    Performed by [facility]cocer Sr[facility] OR    BACK SURGERY      BURSECTOMY - ELBOW Left 10/27/2017    Per[facility] Sr[facility] at [facility] OR    Colonoscopy N/A 4/7/2015    Per[facility] by Vahid Romero [facility] at [facility] ENDO    HERNIA REPAIR Bilateral 04/18/2017    INJECTION-JOINT Left 5/18/2016    [facility]    JOINT REPLACEMENT Left 10/09/2017    L Y[facility]IR Dr. Alcocer    LAMINECTOMY  07/22/2016    LAMINECTOMY-LUMBAR-DECOMPRESSION - L3/4 Laminectomy and Cyst Removal L4/5 Laminectomy No Implants SNS: SSEP/EMG Open carlos + Aaron C-arm 2 hours 1st case N/A 7/22/2016    Performed by Sin Bacon [facility] at [facility] OR 2ND FLR    left elbow  10/2017    procedure to remove gout    lumbar foraminotomy  03/2018    REPAIR-HERNIA-INGUINAL-BILATERAL-LAPAROSCOPIC w/ mesh Bilateral 4/18/2017    Performed by Chauncey Ellis Jr., MD at [facility] OR 2ND FLR    ROTATOR CUFF REPAIR Left 2006    SHOULDER ARTHROSCOPY W/ ROTATOR CUFF REPAIR Right 2009    Transforaminal Epidural Steroid Injection-Left L3-4 & INJECTION-FACET-Left L3-4 Facet Joint Injection & Cyst Aspiration Left 6/14/2016    Performed by Desmond Hutchinson MD at [facility] PAIN T       Review of patient's allergies indicates:   Allergen Reactions    Sulfa (sulfonamide antibiotics)      Can't recall from 1995        No current facility-administered medications on file prior to encounter.      Current Outpatient Medications on File Prior to Encounter   Medication Sig    allopurinol (ZYLOPRIM) 100 MG tablet Take 1 tablet (100 mg total) by mouth once daily.    clonazePAM (KLONOPIN) 0.25 MG TbDL DISOLVE 2 TABLETS BY MOUTH EVERY NIGHT AT BEDTIME    docusate sodium (COLACE) 100 MG capsule Take 1 capsule (100 mg total) by mouth 2 (two) times daily as needed.    ferrous sulfate 324 mg (65 mg iron) TbEC Take 325 mg by mouth once daily.    fluticasone (FLONASE) 50 mcg/actuation nasal spray 2 sprays (100 mcg total) by Each Nare route once daily.    gabapentin (NEURONTIN) 300 MG capsule Take 300 mg by mouth 2 (two) times daily.    GARLIC EXTRACT ORAL Take by mouth once daily.    glipiZIDE (GLUCOTROL) 5 MG tablet TAKE 2 TABLETS (10 MG) TWO TIMES DAILY BEFORE MEALS    lancets (ACCU-CHEK SOFTCLIX LANCETS) Misc 1 lancet by Misc.(Non-Drug; Combo Route) route once daily.    metoprolol succinate (TOPROL-XL) 50 MG 24 hr tablet TAKE 1 TABLET EVERY DAY    multivitamin (ONE DAILY MULTIVITAMIN) per tablet Take 1 tablet by mouth once daily.    pravastatin (PRAVACHOL) 40 MG tablet TAKE 1 TABLET EVERY DAY (Patient taking differently: TAKE 1 TABLET EVERY NIGHT)    tamsulosin (FLOMAX) 0.4 mg Cp24 Take 1 capsule (0.4 mg total) by mouth once daily.    zonisamide (ZONEGRAN) 50 MG Cap Take 50 mg by mouth 2 (two) times daily.     [DISCONTINUED] acetaminophen (TYLENOL) 650 MG TbSR Take 1,300 mg by mouth every 8 (eight) hours.    [DISCONTINUED] aspirin (ECOTRIN) 81 MG EC tablet Take 1 tablet (81 mg total) by mouth once daily.    [DISCONTINUED] cetirizine (ZYRTEC) 10 MG tablet Take 1 tablet (10 mg total) by mouth once daily.    blood sugar diagnostic (ACCU-CHEK FRANKO PLUS TEST STRP) Strp USE  1  STRIP ONE TIME DAILY    blood-glucose meter (ACCU-CHEK FRANKO PLUS METER) Misc 1 Device by Misc.(Non-Drug; Combo Route) route once daily.     celecoxib (CELEBREX) 200 MG capsule TAKE 1 CAPSULE(200 MG) BY MOUTH EVERY DAY    colchicine 0.6 mg tablet Take 2 tablets once, then 1 tablet in 1 hour. (Patient taking differently: Take 0.6 mg by mouth. Take 2 tablets once, then 1 tablet in 1 hour.)    diphth,pertus,acell,,tetanus (BOOSTRIX) 2.5-8-5 Lf-mcg-Lf/0.5mL Syrg injection Inject into the muscle.    sildenafil (VIAGRA) 100 MG tablet Take 1 tablet (100 mg total) by mouth as needed. Take on an empty stomach     Family History     Problem Relation (Age of Onset)    Cancer Brother (50)    Diabetes Mother, Father, Sister    Drug abuse Brother    Heart disease Mother, Father    Hypertension Mother, Father    Stroke Father        Tobacco Use    Smoking status: Never Smoker    Smokeless tobacco: Never Used   Substance and Sexual Activity    Alcohol use: Yes     Alcohol/week: 0.6 oz     Types: 1 Glasses of wine per week     Comment: rarely  No alcohol prior to surgery    Drug use: No    Sexual activity: Yes     Partners: Female     Birth control/protection: Condom     Review of Systems   Constitutional: Negative.  Negative for activity change, appetite change, fatigue and fever.   HENT: Negative.    Eyes: Negative.    Respiratory: Negative.  Negative for cough, chest tightness and shortness of breath.    Cardiovascular: Negative.  Negative for chest pain, palpitations and leg swelling.   Gastrointestinal: Negative.  Negative for abdominal pain, diarrhea, nausea and vomiting.   Endocrine: Negative.    Genitourinary: Negative for dysuria, flank pain, frequency and urgency.   Musculoskeletal: Positive for arthralgias and gait problem.   Skin: Negative.    Allergic/Immunologic: Negative.    Neurological: Negative for dizziness, weakness, light-headedness and headaches.   Hematological: Negative.    Psychiatric/Behavioral: Negative.      Objective:     Vital Signs (Most Recent):  Temp: 98.6 °F (37 °C) (09/11/18 1444)  Pulse: 82 (09/11/18 1444)  Resp: 16 (09/11/18  1444)  BP: (!) 141/80 (09/11/18 1444)  SpO2: 95 % (09/11/18 1444) Vital Signs (24h Range):  Temp:  [97.3 °F (36.3 °C)-98.6 °F (37 °C)] 98.6 °F (37 °C)  Pulse:  [63-90] 82  Resp:  [13-19] 16  SpO2:  [95 %-100 %] 95 %  BP: (125-164)/(61-98) 141/80     Weight: 94.4 kg (208 lb 3 oz)  Body mass index is 26.02 kg/m².    Physical Exam   Constitutional: He is oriented to person, place, and time. He appears well-developed and well-nourished.   HENT:   Head: Normocephalic and atraumatic.   Eyes: EOM are normal. Pupils are equal, round, and reactive to light.   Neck: Normal range of motion. Neck supple.   Cardiovascular: Normal rate, regular rhythm, normal heart sounds, intact distal pulses and normal pulses.   No murmur heard.  Pulmonary/Chest: Effort normal and breath sounds normal. No accessory muscle usage. No tachypnea. No respiratory distress.   Abdominal: Soft. Normal appearance and bowel sounds are normal. There is no tenderness.   Musculoskeletal: Normal range of motion.   Left hip dressing C/D/I   LLE: Neurovascularly intact    Neurological: He is alert and oriented to person, place, and time. He has normal reflexes.   Skin: Skin is warm, dry and intact. Capillary refill takes less than 2 seconds.   Psychiatric: He has a normal mood and affect. His speech is normal and behavior is normal. Judgment and thought content normal. Cognition and memory are normal.   Nursing note and vitals reviewed.      Significant Labs:   BMP: No results for input(s): GLU, NA, K, CL, CO2, BUN, CREATININE, CALCIUM, MG in the last 48 hours.  CBC:   Recent Labs   Lab  09/11/18   1339   HGB  10.2*   HCT  30.1*     CMP: No results for input(s): NA, K, CL, CO2, GLU, BUN, CREATININE, CALCIUM, PROT, ALBUMIN, BILITOT, ALKPHOS, AST, ALT, ANIONGAP, EGFRNONAA in the last 48 hours.    Invalid input(s): ESTGFAFRICA  All pertinent labs within the past 24 hours have been reviewed.    Significant Imaging:

## 2018-09-11 NOTE — HPI
Srinath Mcknight is a 77 year old male with history of CAD, HTN, HLD, and DM2 who underwent left hip resection arthroplasty with drug-eluting implant placement along with the antibiotic beads per Dr. Alcocer. Patient tolerated procedure well and was transferred to Med-Surg unit post-op.  Hospital Medicine was consulted for routine post-op medical management.  Currently, patient appears comfortable in NAD.  Denies any CP, palpitations, SOB, ABD pain, N/V/D, lightheadedness/dizziness, syncope, AMS, HA, focal deficits, fever, or chills. Pain controlled. VSS.

## 2018-09-11 NOTE — PLAN OF CARE
Problem: Patient Care Overview  Goal: Plan of Care Review  Outcome: Ongoing (interventions implemented as appropriate)  Plan of care reviewed with patient; verbalized understanding. Fall precautions maintained, patient remains free from injury. Pain beng managed appropriately. Dressing to left hip is clean, dry and intact. Medications being administered as ordered. Vital signs stable with no signs of distress noted. Bed low and locked with call light in reach. Will continue monitor.

## 2018-09-11 NOTE — ASSESSMENT & PLAN NOTE
S/P left hip resection arthroplasty with drug-eluting implant placement along with the antibiotic beads.                            Managed per primary team    Pain controlled   PT/OT  Eliquis for DVT prophylaxis   Social work consult for discharge planning

## 2018-09-12 PROBLEM — Z96.642 STATUS POST TOTAL REPLACEMENT OF LEFT HIP: Status: ACTIVE | Noted: 2018-09-12

## 2018-09-12 LAB
ESTIMATED AVG GLUCOSE: 111 MG/DL
HBA1C MFR BLD HPLC: 5.5 %
HCT VFR BLD AUTO: 25.3 %
HCT VFR BLD AUTO: 25.5 %
HGB BLD-MCNC: 8.4 G/DL
HGB BLD-MCNC: 8.6 G/DL
POCT GLUCOSE: 151 MG/DL (ref 70–110)
POCT GLUCOSE: 196 MG/DL (ref 70–110)
POCT GLUCOSE: 223 MG/DL (ref 70–110)

## 2018-09-12 PROCEDURE — 63600175 PHARM REV CODE 636 W HCPCS: Performed by: ORTHOPAEDIC SURGERY

## 2018-09-12 PROCEDURE — 97166 OT EVAL MOD COMPLEX 45 MIN: CPT

## 2018-09-12 PROCEDURE — 63600175 PHARM REV CODE 636 W HCPCS: Performed by: NURSE PRACTITIONER

## 2018-09-12 PROCEDURE — 97530 THERAPEUTIC ACTIVITIES: CPT

## 2018-09-12 PROCEDURE — 97116 GAIT TRAINING THERAPY: CPT

## 2018-09-12 PROCEDURE — 36415 COLL VENOUS BLD VENIPUNCTURE: CPT

## 2018-09-12 PROCEDURE — 25000003 PHARM REV CODE 250: Performed by: NURSE PRACTITIONER

## 2018-09-12 PROCEDURE — 85018 HEMOGLOBIN: CPT | Mod: 91

## 2018-09-12 PROCEDURE — G8987 SELF CARE CURRENT STATUS: HCPCS | Mod: CM

## 2018-09-12 PROCEDURE — 11000001 HC ACUTE MED/SURG PRIVATE ROOM

## 2018-09-12 PROCEDURE — 85018 HEMOGLOBIN: CPT

## 2018-09-12 PROCEDURE — 25000003 PHARM REV CODE 250: Performed by: ORTHOPAEDIC SURGERY

## 2018-09-12 PROCEDURE — 94761 N-INVAS EAR/PLS OXIMETRY MLT: CPT

## 2018-09-12 PROCEDURE — 85014 HEMATOCRIT: CPT

## 2018-09-12 PROCEDURE — 96372 THER/PROPH/DIAG INJ SC/IM: CPT

## 2018-09-12 PROCEDURE — 85014 HEMATOCRIT: CPT | Mod: 91

## 2018-09-12 PROCEDURE — G8988 SELF CARE GOAL STATUS: HCPCS | Mod: CJ

## 2018-09-12 PROCEDURE — 97162 PT EVAL MOD COMPLEX 30 MIN: CPT

## 2018-09-12 RX ADMIN — OXYCODONE HYDROCHLORIDE 10 MG: 5 TABLET ORAL at 10:09

## 2018-09-12 RX ADMIN — FLUTICASONE PROPIONATE 100 MCG: 50 SPRAY, METERED NASAL at 08:09

## 2018-09-12 RX ADMIN — APIXABAN 2.5 MG: 2.5 TABLET, FILM COATED ORAL at 08:09

## 2018-09-12 RX ADMIN — CHLORHEXIDINE GLUCONATE 10 ML: 1.2 RINSE ORAL at 08:09

## 2018-09-12 RX ADMIN — OXYCODONE HYDROCHLORIDE 10 MG: 5 TABLET ORAL at 06:09

## 2018-09-12 RX ADMIN — HYDROCHLOROTHIAZIDE 12.5 MG: 12.5 TABLET ORAL at 08:09

## 2018-09-12 RX ADMIN — TAMSULOSIN HYDROCHLORIDE 0.4 MG: 0.4 CAPSULE ORAL at 08:09

## 2018-09-12 RX ADMIN — INSULIN ASPART 2 UNITS: 100 INJECTION, SOLUTION INTRAVENOUS; SUBCUTANEOUS at 10:09

## 2018-09-12 RX ADMIN — OXYCODONE HYDROCHLORIDE 10 MG: 5 TABLET ORAL at 08:09

## 2018-09-12 RX ADMIN — PRAVASTATIN SODIUM 40 MG: 20 TABLET ORAL at 08:09

## 2018-09-12 RX ADMIN — RAMELTEON 8 MG: 8 TABLET, FILM COATED ORAL at 11:09

## 2018-09-12 RX ADMIN — VANCOMYCIN HYDROCHLORIDE 1500 MG: 1 INJECTION, POWDER, LYOPHILIZED, FOR SOLUTION INTRAVENOUS at 04:09

## 2018-09-12 RX ADMIN — ALLOPURINOL 100 MG: 100 TABLET ORAL at 08:09

## 2018-09-12 RX ADMIN — METOPROLOL SUCCINATE 50 MG: 50 TABLET, EXTENDED RELEASE ORAL at 08:09

## 2018-09-12 RX ADMIN — ACETAMINOPHEN 1000 MG: 10 INJECTION, SOLUTION INTRAVENOUS at 01:09

## 2018-09-12 RX ADMIN — GABAPENTIN 300 MG: 300 CAPSULE ORAL at 08:09

## 2018-09-12 RX ADMIN — SODIUM CHLORIDE 500 ML: 0.9 INJECTION, SOLUTION INTRAVENOUS at 03:09

## 2018-09-12 RX ADMIN — LOSARTAN POTASSIUM 50 MG: 50 TABLET, FILM COATED ORAL at 08:09

## 2018-09-12 RX ADMIN — GABAPENTIN 300 MG: 300 CAPSULE ORAL at 09:09

## 2018-09-12 NOTE — ASSESSMENT & PLAN NOTE
Had blood loss from surgery  No signs of active bleeding  Daily H & H  Dropped from 12 >> 8  Continue to monitor

## 2018-09-12 NOTE — PLAN OF CARE
Patient has been accepted to [facility]. Locet called into [facility] [employer]. Pasrr faxed to [facility] and scanned into the record. Will place 142 in chart once received.

## 2018-09-12 NOTE — PLAN OF CARE
Problem: Patient Care Overview  Goal: Plan of Care Review  Outcome: Ongoing (interventions implemented as appropriate)  No acute distress noted. Pain is being managed. Safety measures are in place. Call light within reach. Pt free of falls this shift. Will continue to monitor. Chart check complete. Abduction pillow in place.

## 2018-09-12 NOTE — HOSPITAL COURSE
Pt had a previous left hip replacement that failed therefore, he underwent a resection arthroplasty with antibiotic beads per Dr. Alcocer.  Cultures were obtained and pending at time of discharge.  Gram stains were negative for WBC - Dr. Alcocer felt there was no active infection. Hgb dropped from 12 >> 8.4. He was dizzy with ambulation and B/P borderline low. Saline bolus given and H/H stable. Eliquis DVT prophylaxis initiated for 35 days. 9/13/18 - Hgb/Hct 7.6/22.1 Transfused 2 units PRBCs. Pt denied further dizziness and SOB. PT/OT continued. Pt with appropriate urinary output and + bowel movement. He tolerated PT/OT. He was medically stable for discharge and seen and at time of discharge and deemed appropriate. H/H was stable. Pt was discharged to [facility]. Eliquis DVT prophylaxis was prescribed for 35 days.

## 2018-09-12 NOTE — PT/OT/SLP EVAL
Physical Therapy Evaluation    Patient Name:  Srinath Mcknight   MRN:  [id number]    Recommendations:     Discharge Recommendations:  nursing facility, skilled   Discharge Equipment Recommendations: none   Barriers to discharge: DECREASED CAREGIVER SUPPORT    Assessment:     Srinath Mcknight is a 77 y.o. male admitted with a medical diagnosis of Left hip pain.  He presents with the following impairments/functional limitations:  weakness, gait instability, impaired endurance, impaired balance, decreased ROM, decreased lower extremity function, decreased safety awareness, pain, impaired functional mobilty, impaired self care skills .    Rehab Prognosis:  GOOD; patient would benefit from acute skilled PT services to address these deficits and reach maximum level of function.      Recent Surgery: Procedure(s) (LRB):  REVISION, TOTAL ARTHROPLASTY, HIP (Left) 1 Day Post-Op    Plan:     During this hospitalization, patient to be seen 5 x/week to address the above listed problems via gait training, therapeutic activities, therapeutic exercises  · Plan of Care Expires:  09/19/18   Plan of Care Reviewed with: patient    Subjective     Communicated with NURSE MARKS AND Epic CHART REVIEW prior to session.  Patient found SUPINE IN BED WITH HOB ELEVATED upon PT entry to room, agreeable to evaluation.      Chief Complaint: PAIN  Patient comments/goals: NONE STATED  Pain/Comfort:  · Pain Rating 1: 3/10  · Location - Side 1: Left  · Location 1: hip    Patients cultural, spiritual, [employer] conflicts given the current situation:      Living Environment:  PT LIVES ALONE IN ONE STORY HOME WITH NO STEPS TO ENTER. PT REPORTS BEING IND WITH ADLS AND DRIVING. PT REPORTS USING FOREARM CRUTCHES TO GET AROUND WHEN C/O HIP PAIN D/T HX OF B ROTATOR CUFF SX.   Prior to admission, patients level of function was MOD I WITH FOREARM CRUTCHES.  Patient has the following equipment: cane, straight, shower chair, rollator, walker, rolling, crutches,  forearm(HH SHOWER HEAD).  DME owned (not currently used): none.  Upon discharge, patient will have assistance from UNKNOWN.    Objective:     Patient found with: SCD     General Precautions: Standard, fall   Orthopedic Precautions:LLE toe touch weight bearing   Braces: N/A     Exams:  · Sensation: -       Intact  · RLE ROM: WFL  · RLE Strength: 4+/5 GROSSLY  · LLE ROM: WFL  · LLE Strength: DF 4+/5, HIP FLEX, KNEE FLEX, KNEE EXT NOT TESTED DUE TO PAIN    Functional Mobility:  · PT FOUND SUP IN BED UPON P.T. ENTRY. P.T. EDUCATED PT ON HIP ABDUCTION PILLOW AND PURPOSE OF IT, AS WELL AS ANTERIOR LATERAL HIP PRECAUTIONS. PT SUP>SIT>SCOOT EOB WITH MIN A OF L LE AND PT ASSIST OF BRIDGING TO SCOOT HIP TOWARD EOB. MMT AND SENSATION ASSESSED EOB. PT SIT>STAND WITH MIN A AND RW FOR B UE SUPPORT AND MAINTAINING TTWB. PT GAIT TRAINED 15' X 2 WITH RW, TTWB, AND MIN A FOR RW ADVANCEMENT WITH ONE SEATED REST BREAK. PT RETURNED TO ROOM AND T/F TO BEDSIDE CHAIR WITH MIN A. P.T. REVIEWED HIP PRECAUTIONS WITH PT AGAIN AND PT VERBALIZED UNDERSTANDING. CALL BUTTON IN REACH AND ALL NEEDS MET.     AM-PAC 6 CLICK MOBILITY  Total Score:16       Patient left up in chair with call button in reach.    GOALS:   Multidisciplinary Problems     Physical Therapy Goals        Problem: Physical Therapy Goal    Goal Priority Disciplines Outcome Goal Variances Interventions   Physical Therapy Goal     PT, PT/OT      Description:  PT WILL BE SEEN MINIMUM OF 5 OUT OF 7 DAYS A WEEK  LTGS: 9/19/18  1. PT WILL PERFORM BED MOBILITY WITH SUPERVISION  2. PT WILL PERFORM SIT<>STAND T/F WITH SUPERVISION  3. PT WILL GAIT TRAIN X 220' WITH RW AND TTWB WITH SUPERVISION ON INDOOR LEVEL SURFACE  4. PT WILL PERFORM THEREX B LE IN ALL PLANES X 20 REPS FOR STRENGTHENING                    History:     Past Medical History:   Diagnosis Date    Anemia due to unknown mechanism 11/10/2015    Angina pectoris 2014    not since    Arthritis     Back pain     Coronary  artery disease     Encounter for blood transfusion     Gout 12/05/2017    right foot     Hyperlipemia     Hypertension     CHARLES (obstructive sleep apnea)     Spinal cord disease     Trouble in sleeping     Type 2 diabetes mellitus with diabetic polyneuropathy, without long-term current use of insulin     Uses hearing aid     bilat       Past Surgical History:   Procedure Laterality Date    ARTHROPLASTY-HIP (ANTERIOR APPROACH) Left 10/9/2017    Per[facility]cocer Sr[facility] at [facility] OR    BACK SURGERY      BURSECTOMY - ELBOW Left 10/27/2017    Performed by [facility]coc[facility] Sr[facility] at [facility] OR    Colonoscopy N/A 4/7/2015    Performed by Vahid Romero MD at [facility] ENDO    HERNIA REPAIR Bilateral 04/18/2017    INJECTION-JOINT Left 5/18/2016    [facility]    JOINT REPLACEMENT Left 10/09/2017    L YAHIR Dr. Alcocer    LAMINECTOMY  07/22/2016    LAMINECTOMY-LUMBAR-DECOMPRESSION - L3/4 Laminectomy and Cyst Removal L4/5 Laminectomy No Implants SNS: SSEP/EMG Open carlos + Aaron C-arm 2 hours 1st case N/A 7/22/2016    Performed by Sin Bacon [facility] at [facility] OR 2ND FLR    left elbow  10/2017    procedure to remove gout    lumbar foraminotomy  03/2018    REPAIR-HERNIA-INGUINAL-BILATERAL-LAPAROSCOPIC w/ mesh Bilateral 4/18/2017    Performed by Chauncey Ellis Jr., MD at [facility] OR 2ND FLR    ROTATOR CUFF REPAIR Left 2006    SHOULDER ARTHROSCOPY W/ ROTATOR CUFF REPAIR Right 2009    Transforaminal Epidural Steroid Injection-Left L3-4 & INJECTION-FACET-Left L3-4 Facet Joint Injection & Cyst Aspiration Left 6/14/2016    Performed by Desmond Hutchinson MD at [facility]       Clinical Decision Making:     History  Co-morbidities and personal factors that may impact the plan of care Examination  Body Structures and Functions, activity limitations and participation restrictions that may impact the plan of care Clinical Presentation   Decision Making/ Complexity  Score   Co-morbidities:   [] Time since onset of injury / illness / exacerbation  [] Status of current condition  []Patient's cognitive status and safety concerns    [] Multiple Medical Problems (see med hx)  Personal Factors:   [] Patient's age  [] Prior Level of function   [] Patient's home situation (environment and family support)  [] Patient's level of motivation  [] Expected progression of patient      HISTORY:(criteria)    [] 79705 - no personal factors/history    [] 46301 - has 1-2 personal factor/comorbidity     [] 05510 - has >3 personal factor/comorbidity     Body Regions:  [] Objective examination findings  [] Head     []  Neck  [] Trunk   [] Upper Extremity  [] Lower Extremity    Body Systems:  [] For communication ability, affect, cognition, language, and learning style: the assessment of the ability to make needs known, consciousness, orientation (person, place, and time), expected emotional /behavioral responses, and learning preferences (eg, learning barriers, education  needs)  [] For the neuromuscular system: a general assessment of gross coordinated movement (eg, balance, gait, locomotion, transfers, and transitions) and motor function  (motor control and motor learning)  [] For the musculoskeletal system: the assessment of gross symmetry, gross range of motion, gross strength, height, and weight  [] For the integumentary system: the assessment of pliability(texture), presence of scar formation, skin color, and skin integrity  [] For cardiovascular/pulmonary system: the assessment of heart rate, respiratory rate, blood pressure, and edema     Activity limitations:    [] Patient's cognitive status and saf ety concerns          [] Status of current condition      [] Weight bearing restriction  [] Cardiopulmunary Restriction    Participation Restrictions:   [] Goals and goal agreement with the patient     [] Rehab potential (prognosis) and probable outcome      Examination of Body System:  (criteria)    [] 75719 - addressing 1-2 elements    [] 28108 - addressing a total of 3 or more elements     [] 73729 -  Addressing a total of 4 or more elements         Clinical Presentation: (criteria)  Choose one     On examination of body system using standardized tests and measures patient presents with (CHOOSE ONE) elements from any of the following: body structures and functions, activity limitations, and/or participation restrictions.  Leading to a clinical presentation that is considered (CHOOSE ONE)                              Clinical Decision Making  (Eval Complexity):  Choose One     Time Tracking:     PT Received On: 09/12/18  PT Start Time: 0947     PT Stop Time: 1017  PT Total Time (min): 30 min     Billable Minutes: Evaluation 20 and Gait Training 10    PT EDUCATED ON CALLING NURSE FOR ASSISTANCE BACK TO BED D/T FALL RISK. PT VERBALIZED UNDERSTANDING    Kusum Martínez, SPT  09/12/2018

## 2018-09-12 NOTE — SUBJECTIVE & OBJECTIVE
Interval History:  Reports pain with movement of left hip, dizzy with ambulation and no difficulty urinating.     Review of Systems   Constitutional: Positive for activity change. Negative for appetite change, fatigue and fever.   HENT: Negative.    Eyes: Negative.    Respiratory: Negative.  Negative for cough, chest tightness and shortness of breath.    Cardiovascular: Negative.  Negative for chest pain, palpitations and leg swelling.   Gastrointestinal: Negative.  Negative for abdominal pain, diarrhea, nausea and vomiting.   Endocrine: Negative.    Genitourinary: Negative for dysuria, flank pain, frequency and urgency.   Musculoskeletal: Positive for arthralgias and gait problem.   Skin: Negative.    Allergic/Immunologic: Negative.    Neurological: Positive for dizziness. Negative for weakness, light-headedness and headaches.   Hematological: Negative.    Psychiatric/Behavioral: Negative.      Objective:     Vital Signs (Most Recent):  Temp: 98.6 °F (37 °C) (09/12/18 1625)  Pulse: 89 (09/12/18 1625)  Resp: 18 (09/12/18 1625)  BP: (!) 99/53 (09/12/18 1625)  SpO2: 99 % (09/12/18 1625) Vital Signs (24h Range):  Temp:  [97.6 °F (36.4 °C)-98.8 °F (37.1 °C)] 98.6 °F (37 °C)  Pulse:  [] 89  Resp:  [16-20] 18  SpO2:  [94 %-99 %] 99 %  BP: ()/(53-66) 99/53     Weight: 94.4 kg (208 lb 3 oz)  Body mass index is 26.02 kg/m².    Intake/Output Summary (Last 24 hours) at 9/12/2018 1640  Last data filed at 9/12/2018 0425  Gross per 24 hour   Intake 950 ml   Output 400 ml   Net 550 ml      Physical Exam   Constitutional: He is oriented to person, place, and time. He appears well-developed and well-nourished.   HENT:   Head: Normocephalic and atraumatic.   Eyes: Conjunctivae are normal.   Neck: Normal range of motion. Neck supple.   Cardiovascular: Normal rate, regular rhythm, normal heart sounds, intact distal pulses and normal pulses.   No murmur heard.  Pulmonary/Chest: Effort normal and breath sounds normal. No  accessory muscle usage. No tachypnea. No respiratory distress.   Abdominal: Soft. Normal appearance and bowel sounds are normal. There is no tenderness.   Musculoskeletal: Normal range of motion.   Left hip dressing C/D/I   LLE: Neurovascularly intact    Neurological: He is alert and oriented to person, place, and time. He has normal reflexes.   Skin: Skin is warm, dry and intact. Capillary refill takes less than 2 seconds.   Psychiatric: He has a normal mood and affect. His speech is normal and behavior is normal. Judgment and thought content normal. Cognition and memory are normal.   Nursing note and vitals reviewed.      Significant Labs:   CBC:   Recent Labs   Lab  09/11/18   1339  09/12/18   0448  09/12/18   1512   HGB  10.2*  8.6*  8.4*   HCT  30.1*  25.5*  25.3*     CMP: No results for input(s): NA, K, CL, CO2, GLU, BUN, CREATININE, CALCIUM, PROT, ALBUMIN, BILITOT, ALKPHOS, AST, ALT, ANIONGAP, EGFRNONAA in the last 48 hours.    Invalid input(s): ESTGF[place]  All pertinent labs within the past 24 hours have been reviewed.    Significant Imaging: I have reviewed all pertinent imaging results/findings within the past 24 hours.

## 2018-09-12 NOTE — PROGRESS NOTES
[facility] Hospital Medicine  Progress Note    Patient Name: Srinath Mcknight  MRN: [id number]  Patient Class: IP- Inpatient   Admission Date: 9/11/2018  Length of Stay: 1 days  Attending Physician: Albaro Alcocer Sr., MD  Primary Care Provider: Yeison Wilder MD        Subjective:     Principal Problem:Status post total replacement of left hip    HPI:  Srinath Mcknight is a 77 year old male with history of CAD, HTN, HLD, and DM2 who underwent left hip resection arthroplasty with drug-eluting implant placement along with the antibiotic beads per Dr. Alcocer.  Patient tolerated procedure well and was transferred to Med-Surg unit post-op.  Hospital Medicine was consulted for routine post-op medical management.  Currently, patient appears comfortable in NAD.  Denies any CP, palpitations, SOB, ABD pain, N/V/D, lightheadedness/dizziness, syncope, AMS, HA, focal deficits, fever, or chills. Pain controlled. VSS.           Hospital Course:  Pt had a previous left hip replacement that failed therefore, he underwent a resection arthroplasty with antibiotic beads per Dr. Alcocer. Dr. Alcocer stated that cultures were obtained and pending. Gram stains were negative for WBC - feels there is no active infection. Hgb dropped from 12 >> 8.4. He was dizzy with ambulation and B/P borderline low. Saline bolus given and H/H stable. Eliquis DVT prophylaxis in progress. Pt discharge to SNF when appropriate.     Interval History:  Reports pain with movement of left hip, dizzy with ambulation and no difficulty urinating.     Review of Systems   Constitutional: Positive for activity change. Negative for appetite change, fatigue and fever.   HENT: Negative.    Eyes: Negative.    Respiratory: Negative.  Negative for cough, chest tightness and shortness of breath.    Cardiovascular: Negative.  Negative for chest pain, palpitations and leg swelling.   Gastrointestinal: Negative.  Negative for abdominal pain, diarrhea, nausea and  vomiting.   Endocrine: Negative.    Genitourinary: Negative for dysuria, flank pain, frequency and urgency.   Musculoskeletal: Positive for arthralgias and gait problem.   Skin: Negative.    Allergic/Immunologic: Negative.    Neurological: Positive for dizziness. Negative for weakness, light-headedness and headaches.   Hematological: Negative.    Psychiatric/Behavioral: Negative.      Objective:     Vital Signs (Most Recent):  Temp: 98.6 °F (37 °C) (09/12/18 1625)  Pulse: 89 (09/12/18 1625)  Resp: 18 (09/12/18 1625)  BP: (!) 99/53 (09/12/18 1625)  SpO2: 99 % (09/12/18 1625) Vital Signs (24h Range):  Temp:  [97.6 °F (36.4 °C)-98.8 °F (37.1 °C)] 98.6 °F (37 °C)  Pulse:  [] 89  Resp:  [16-20] 18  SpO2:  [94 %-99 %] 99 %  BP: ()/(53-66) 99/53     Weight: 94.4 kg (208 lb 3 oz)  Body mass index is 26.02 kg/m².    Intake/Output Summary (Last 24 hours) at 9/12/2018 1640  Last data filed at 9/12/2018 0425  Gross per 24 hour   Intake 950 ml   Output 400 ml   Net 550 ml      Physical Exam   Constitutional: He is oriented to person, place, and time. He appears well-developed and well-nourished.   HENT:   Head: Normocephalic and atraumatic.   Eyes: Conjunctivae are normal.   Neck: Normal range of motion. Neck supple.   Cardiovascular: Normal rate, regular rhythm, normal heart sounds, intact distal pulses and normal pulses.   No murmur heard.  Pulmonary/Chest: Effort normal and breath sounds normal. No accessory muscle usage. No tachypnea. No respiratory distress.   Abdominal: Soft. Normal appearance and bowel sounds are normal. There is no tenderness.   Musculoskeletal: Normal range of motion.   Left hip dressing C/D/I   LLE: Neurovascularly intact    Neurological: He is alert and oriented to person, place, and time. He has normal reflexes.   Skin: Skin is warm, dry and intact. Capillary refill takes less than 2 seconds.   Psychiatric: He has a normal mood and affect. His speech is normal and behavior is normal.  Judgment and thought content normal. Cognition and memory are normal.   Nursing note and vitals reviewed.      Significant Labs:   CBC:   Recent Labs   Lab  09/11/18   1339  09/12/18   0448  09/12/18   1512   HGB  10.2*  8.6*  8.4*   HCT  30.1*  25.5*  25.3*     CMP: No results for input(s): NA, K, CL, CO2, GLU, BUN, CREATININE, CALCIUM, PROT, ALBUMIN, BILITOT, ALKPHOS, AST, ALT, ANIONGAP, EGFRNONAA in the last 48 hours.    Invalid input(s): ESTGF[place]  All pertinent labs within the past 24 hours have been reviewed.    Significant Imaging: I have reviewed all pertinent imaging results/findings within the past 24 hours.    Assessment/Plan:      * Status post total replacement of left hip    S/P left hip resection arthroplasty with drug-eluting implant placement along with the antibiotic beads.                            Managed per primary team    Pain controlled   PT/OT  Eliquis for DVT prophylaxis   Social work consult for discharge planning           Acute blood loss anemia    Had blood loss from surgery  No signs of active bleeding  Daily H & H  Dropped from 12 >> 8  Continue to monitor          Essential hypertension    Resume home meds  Monitor           Coronary artery disease, occlusive    Stable   Will resume current medical management           Combined hyperlipidemia associated with type 2 diabetes mellitus    Resume statin   Accucheck and SSI low dose             VTE Risk Mitigation (From admission, onward)        Ordered     apixaban tablet 2.5 mg  2 times daily      09/11/18 1438     IP VTE HIGH RISK PATIENT  Once      09/11/18 1438     Place BA hose  Until discontinued      09/11/18 1438     Place sequential compression device  Until discontinued      09/11/18 1438     Place BA hose  Until discontinued      09/11/18 0914     Place sequential compression device  Until discontinued      09/11/18 0914              Cyndi Dave NP  Department of Hospital Medicine   [facility]

## 2018-09-12 NOTE — PT/OT/SLP PROGRESS
Physical Therapy  Treatment    Srinath Mcknight   MRN: [id number]   Admitting Diagnosis: Left hip pain    PT Received On: 09/12/18  PT Start Time: 1415     PT Stop Time: 1438    PT Total Time (min): 23 min       Billable Minutes:  Therapeutic Activity 23    Treatment Type: Treatment  PT/PTA: PT             General Precautions: Standard, fall  Orthopedic Precautions: LLE toe touch weight bearing   Braces: N/A         Subjective:  Communicated with NURSE MARKS AND Epic CHART REVIEWprior to session.  PT FOUND SITTING UPRIGHT IN BEDSIDE CHAIR    Pain/Comfort  Pain Rating 1: 6/10  Location - Side 1: Left  Location 1: hip    Objective:   Patient found with: SCD, peripheral IV    Functional Mobility:  PT FOUND SITTING UPRIGHT IN BEDSIDE CHAIR AND REPORTED BEING UP IN CHAIR SINCE AM P.T. TX. PT SIT<>STAND WITH MIN A AND C/O DIZZINESS. PT BP ASSESSED IN SITTING AS 98/57 AFTER APPROXIMATELY 2 MIN OF SEATED REST. PT SIT>STAND MIN A FOR STANDING BP MEASURED AS 82/51 WITH CONT C/O DIZZINESS. PT T/F TO EOB WITH CGA AND SIT>SUP MIN A OF L LE. CALL ALLISON IN REACH AND NURSE NOTIFIED.     AM-PAC 6 CLICK MOBILITY  How much help from another person does this patient currently need?   1 = Unable, Total/Dependent Assistance  2 = A lot, Maximum/Moderate Assistance  3 = A little, Minimum/Contact Guard/Supervision  4 = None, Modified [place]/Independent    Turning over in bed (including adjusting bedclothes, sheets and blankets)?: 3  Sitting down on and standing up from a chair with arms (e.g., wheelchair, bedside commode, etc.): 3  Moving from lying on back to sitting on the side of the bed?: 3  Moving to and from a bed to a chair (including a wheelchair)?: 3  Need to walk in hospital room?: 3  Climbing 3-5 steps with a railing?: 1  Basic Mobility Total Score: 16    AM-PAC Raw Score CMS G-Code Modifier Level of Impairment Assistance   6 % Total / Unable   7 - 9 CM 80 - 100% Maximal Assist   10 - 14 CL 60 - 80% Moderate Assist   15  - 19 CK 40 - 60% Moderate Assist   20 - 22 CJ 20 - 40% Minimal Assist   23 CI 1-20% SBA / CGA   24 CH 0% Independent/ Mod I     Patient left supine with call button in reach and NURSE SELINA notified.    Assessment:  PT TOLERATED P.T. POORLY D/T INC C/O DIZZINESS AND LOW BP.     Rehab identified problem list/impairments: Rehab identified problem list/impairments: weakness, gait instability, decreased ROM, impaired cardiopulmonary response to activity, impaired endurance, impaired balance, decreased lower extremity function, decreased safety awareness, pain, impaired self care skills, impaired functional mobilty    Rehab potential is good.    Activity tolerance: Poor    Discharge recommendations: Discharge Facility/Level Of Care Needs: nursing facility, skilled     Barriers to discharge:      Equipment recommendations: Equipment Needed After Discharge: none     GOALS:   Multidisciplinary Problems     Physical Therapy Goals        Problem: Physical Therapy Goal    Goal Priority Disciplines Outcome Goal Variances Interventions   Physical Therapy Goal     PT, PT/OT      Description:  PT WILL BE SEEN MINIMUM OF 5 OUT OF 7 DAYS A WEEK  LTGS: 9/19/18  1. PT WILL PERFORM BED MOBILITY WITH SUPERVISION  2. PT WILL PERFORM SIT<>STAND T/F WITH SUPERVISION  3. PT WILL GAIT TRAIN X 220' WITH RW AND TTWB WITH SUPERVISION ON INDOOR LEVEL SURFACE  4. PT WILL PERFORM THEREX B LE IN ALL PLANES X 20 REPS FOR STRENGTHENING                    PLAN:    Patient to be seen 5 x/week  to address the above listed problems via gait training, therapeutic activities, therapeutic exercises  Plan of Care expires: 09/19/18  Plan of Care reviewed with: patient         Kusum Bianchi Mauricio, SPT  09/12/2018

## 2018-09-12 NOTE — PLAN OF CARE
Problem: Physical Therapy Goal  Goal: Physical Therapy Goal  PT WILL BE SEEN MINIMUM OF 5 OUT OF 7 DAYS A WEEK  LTGS: 9/19/18  1. PT WILL PERFORM BED MOBILITY WITH SUPERVISION  2. PT WILL PERFORM SIT<>STAND T/F WITH SUPERVISION  3. PT WILL GAIT TRAIN X 220' WITH RW AND TTWB WITH SUPERVISION ON INDOOR LEVEL SURFACE  4. PT WILL PERFORM THEREX B LE IN ALL PLANES X 20 REPS FOR STRENGTHENING   Outcome: Ongoing (interventions implemented as appropriate)  PT SIT<>STAND MIN A MAINTAINING TTWB WITH RW FOR B UE SUPPORT.

## 2018-09-12 NOTE — NURSING
Removed patient álvarez, pt tolerated well. Informed patient to let day shift nurse know if he has not voided within 6 hours from removal of álvarez, 12noon. Pt verbalized understanding.

## 2018-09-12 NOTE — PT/OT/SLP EVAL
Occupational Therapy   Evaluation    Name: Srinath Mcknight  MRN: [id number]  Admitting Diagnosis:  Left hip pain 1 Day Post-Op    Recommendations:     Discharge Recommendations: nursing facility, skilled  Discharge Equipment Recommendations:  none  Barriers to discharge:       History:     Occupational Profile:  Living Environment: lives alone in 1 story house no steps  Previous level of function: (i) with adl's and mod (i) functional mobility  Roles and Routines: occupational therapy  Equipment Used at Home:  crutches, walker, rolling, cane, straight, shower chair  Assistance upon Discharge:     Past Medical History:   Diagnosis Date    Anemia due to unknown mechanism 11/10/2015    Angina pectoris 2014    not since    Arthritis     Back pain     Coronary artery disease     Encounter for blood transfusion     Gout 12/05/2017    right foot     Hyperlipemia     Hypertension     CHARLES (obstructive sleep apnea)     Spinal cord disease     Trouble in sleeping     Type 2 diabetes mellitus with diabetic polyneuropathy, without long-term current use of insulin     Uses hearing aid     bilat       Past Surgical History:   Procedure Laterality Date    ARTHROPLASTY-HIP (ANTERIOR APPROACH) Left 10/9/2017    Per[facility]baro [facility]cocer Sr., [facility] at [facility] OR    BACK SURGERY      BURSECTOMY - ELBOW Left 10/27/2017    Per[facility] Sr[facility] at [facility] OR    Colonoscopy N/A 4/7/2015    Performed by Vahid Romero MD at [facility] ENDO    HERNIA REPAIR Bilateral 04/18/2017    INJECTION-JOINT Left 5/18/2016    Performed by Errol Madera MD at [facility] [place], operated by [facility] PAIN MGT    JOINT REPLACEMENT Left 10/09/2017    L YAHIR Dr. Alcocer    LAMINECTOMY  07/22/2016    LAMINECTOMY-LUMBAR-DECOMPRESSION - L3/4 Laminectomy and Cyst Removal L4/5 Laminectomy No Implants SNS: SSEP/EMG Open carlos + Aaron C-arm 2 hours 1st case N/A 7/22/2016    Performed by Sin Bacon MD at [facility] OR 2ND FLR    left elbow  10/2017    procedure to  remove gout    lumbar foraminotomy  03/2018    REPAIR-HERNIA-INGUINAL-BILATERAL-LAPAROSCOPIC w/ mesh Bilateral 4/18/2017    Performed by Chauncey Ellis Jr., MD at [facility] FLR    ROTATOR CUFF REPAIR Left 2006    SHOULDER ARTHROSCOPY W/ ROTATOR CUFF REPAIR Right 2009    Transforaminal Epidural Steroid Injection-Left L3-4 & INJECTION-FACET-Left L3-4 Facet Joint Injection & Cyst Aspiration Left 6/14/2016    Performed by Desmond Hutchinson MD at [facility] PAIN MGT       Subjective     Chief Complaint: impaired adl's, decrease b ue strength/endurance, impaired functional mobility  Patient/Family Comments/goals:     Pain/Comfort:  · Pain Rating 1: 6/10  · Location - Side 1: Left  · Location - Orientation 1: lower  · Location 1: hip    Patients cultural, spiritual, Gnosticism conflicts given the current situation:      Objective:     Communicated with: nurse and epic chart review prior to session.  Patient found all lines intact, call button in reach and nurse notified and peripheral IV upon OT entry to room.    General Precautions: Standard, fall   Orthopedic Precautions:LLE weight bearing as tolerated   Braces: N/A     Occupational Performance:    Bed Mobility:    · Patient completed Rolling/Turning to Left with  moderate assistance and with leg lift  · Patient completed Scooting/Bridging with moderate assistance and with leg lift  · Patient completed Sit to Supine with moderate assistance and with leg lift    Functional Mobility/Transfers:  · Patient completed Sit <> Stand Transfer with moderate assistance  with  rolling walker   · Patient completed Bed <> Chair Transfer using Stand Pivot technique with minimum assistance with rolling walker  Functional Mobility:   Activities of Daily Living:  · Upper Body Dressing: minimum assistance .  · Lower Body Dressing: maximal assistance x1    Cognitive/Visual Perceptual:  Cognitive/Psychosocial Skills:     -       Oriented to: Person, Place, Time and Situation   -        "Follows Commands/attention:Follows two-step commands  -       Communication: clear/fluent  -       Memory: No Deficits noted  -       Safety awareness/insight to disability: impaired   Visual/Perceptual:      -Intact .    Physical Exam:  Upper Extremity Range of Motion:     -       Right Upper Extremity: WFL .  -       Left Upper Extremity: WFL . .  Upper Extremity Strength:    -       Right Upper Extremity: mmt: 4/5 grossly  -       Left Upper Extremity: mmt: 4/5 grossly .   Strength:    -       Right Upper Extremity: mmt: 4/5 grossly .  -       Left Upper Extremity: mmt: 4/5 grossly .    AMPAC 6 Click ADL:  AMPAC Total Score: 18    Treatment & Education:  Pt seen in room with low blood pressure in seated position 98/57. Pt req min a with sit<>stand t/f's and bp dropped 82/51. Pt req min a with bed side chair >bed. Pt req mod / min a with bed mobility and leg lift  Education:    Patient left up in chair with all lines intact, call button in reach and nurse notified    Assessment:     Srinath Mcknight is a 77 y.o. male with a medical diagnosis of Left hip pain.  He presents with the following performance deficits affecting function: weakness, impaired self care skills, impaired balance, decreased safety awareness, impaired endurance, impaired functional mobilty, gait instability.      Rehab Prognosis: Good; patient would benefit from acute skilled OT services to address these deficits and reach maximum level of function.         Clinical Decision Makin.  OT Mod:  "Pt evaluation falls under moderate complexity for evaluation coding due to identification of 3-5 performance deficits noted as stated above. Eval required Min/Mod assistance to complete on this date and detailed assessment(s) were utilized. Moreover, an expanded review of history and occupational profile obtained with additional review of cognitive, physical and psychosocial hx."     Plan:     Patient to be seen 3 x/week to address the above " listed problems via self-care/home management, therapeutic activities, therapeutic exercises  · Plan of Care Expires: 09/19/18  · Plan of Care Reviewed with: patient    This Plan of care has been discussed with the patient who was involved in its development and understands and is in agreement with the identified goals and treatment plan    GOALS:   Multidisciplinary Problems     Occupational Therapy Goals        Problem: Occupational Therapy Goal    Goal Priority Disciplines Outcome Interventions   Occupational Therapy Goal     OT, PT/OT     Description:  ot goals to be met by 9-18-18  sba with le dressing  s with ue dressing  Pt will tolerate 1 set x 15 reps b ue rom exercise with min resistance                    Time Tracking:     OT Date of Treatment: 09/12/18  OT Start Time: 1410  OT Stop Time: 1439  OT Total Time (min): 29 min    Billable Minutes:Evaluation 14 minutes  Therapeutic Activity 15 minutes    Nisha Singletary OT  9/12/2018

## 2018-09-12 NOTE — PLAN OF CARE
Problem: Patient Care Overview  Goal: Plan of Care Review  Outcome: Ongoing (interventions implemented as appropriate)  Fall precautions maintained. Pt free from falls/injuries.  Patient complains of pain. Pain controlled with PRN meds.  Antibiotics given as prescribed.  Ambulates and repositions with assistance.  Accucheck done, coverage given as needed.  Plan of care and medications discussed with patient.  Patient verbalized understanding.  Bed locked and low, call bell within reach.  Chart check done. Will continue to monitor.

## 2018-09-12 NOTE — ANESTHESIA POSTPROCEDURE EVALUATION
"Anesthesia Post Evaluation    Patient: Srinath Mcknight    Procedure(s) Performed: Procedure(s) (LRB):  ARTHROPLASTY-RESECTION (Left)    Final Anesthesia Type: general  Patient location during evaluation: PACU  Patient participation: Yes- Able to Participate  Level of consciousness: awake and alert and oriented  Post-procedure vital signs: reviewed and stable  Pain management: adequate  Airway patency: patent  PONV status at discharge: No PONV  Anesthetic complications: no      Cardiovascular status: hemodynamically stable  Respiratory status: unassisted, room air and spontaneous ventilation  Hydration status: euvolemic  Follow-up not needed.        Visit Vitals  /66   Pulse 88   Temp 36.4 °C (97.6 °F)   Resp 18   Ht 6' 3" (1.905 m)   Wt 94.4 kg (208 lb 3 oz)   SpO2 96%   BMI 26.02 kg/m²       Pain/Niki Score: Pain Assessment Performed: Yes (9/11/2018  5:04 PM)  Presence of Pain: complains of pain/discomfort (9/11/2018  5:04 PM)  Pain Rating Prior to Med Admin: 8 (9/11/2018  7:14 PM)  Pain Rating Post Med Admin: 5 (9/11/2018  5:16 PM)  Niki Score: 10 (9/11/2018  2:15 PM)        "

## 2018-09-13 LAB
BLD PROD TYP BPU: NORMAL
BLD PROD TYP BPU: NORMAL
BLOOD UNIT EXPIRATION DATE: NORMAL
BLOOD UNIT EXPIRATION DATE: NORMAL
BLOOD UNIT TYPE CODE: 6200
BLOOD UNIT TYPE CODE: 6200
BLOOD UNIT TYPE: NORMAL
BLOOD UNIT TYPE: NORMAL
CODING SYSTEM: NORMAL
CODING SYSTEM: NORMAL
DISPENSE STATUS: NORMAL
DISPENSE STATUS: NORMAL
HCT VFR BLD AUTO: 22.1 %
HGB BLD-MCNC: 7.6 G/DL
NUM UNITS TRANS PACKED RBC: NORMAL
NUM UNITS TRANS PACKED RBC: NORMAL
POCT GLUCOSE: 162 MG/DL (ref 70–110)
POCT GLUCOSE: 187 MG/DL (ref 70–110)
POCT GLUCOSE: 200 MG/DL (ref 70–110)
POCT GLUCOSE: 226 MG/DL (ref 70–110)

## 2018-09-13 PROCEDURE — 63600175 PHARM REV CODE 636 W HCPCS: Performed by: NURSE PRACTITIONER

## 2018-09-13 PROCEDURE — 85014 HEMATOCRIT: CPT

## 2018-09-13 PROCEDURE — 94761 N-INVAS EAR/PLS OXIMETRY MLT: CPT

## 2018-09-13 PROCEDURE — 25000003 PHARM REV CODE 250: Performed by: ORTHOPAEDIC SURGERY

## 2018-09-13 PROCEDURE — 36430 TRANSFUSION BLD/BLD COMPNT: CPT

## 2018-09-13 PROCEDURE — P9016 RBC LEUKOCYTES REDUCED: HCPCS

## 2018-09-13 PROCEDURE — 25000003 PHARM REV CODE 250: Performed by: NURSE PRACTITIONER

## 2018-09-13 PROCEDURE — 97110 THERAPEUTIC EXERCISES: CPT

## 2018-09-13 PROCEDURE — 85018 HEMOGLOBIN: CPT

## 2018-09-13 PROCEDURE — 11000001 HC ACUTE MED/SURG PRIVATE ROOM

## 2018-09-13 PROCEDURE — 36415 COLL VENOUS BLD VENIPUNCTURE: CPT

## 2018-09-13 RX ORDER — AMOXICILLIN 250 MG
1 CAPSULE ORAL 2 TIMES DAILY
Status: DISCONTINUED | OUTPATIENT
Start: 2018-09-13 | End: 2018-09-14 | Stop reason: HOSPADM

## 2018-09-13 RX ORDER — FUROSEMIDE 10 MG/ML
20 INJECTION INTRAMUSCULAR; INTRAVENOUS ONCE
Status: COMPLETED | OUTPATIENT
Start: 2018-09-13 | End: 2018-09-13

## 2018-09-13 RX ORDER — ACETAMINOPHEN 325 MG/1
650 TABLET ORAL EVERY 6 HOURS PRN
Status: DISCONTINUED | OUTPATIENT
Start: 2018-09-13 | End: 2018-09-14 | Stop reason: HOSPADM

## 2018-09-13 RX ORDER — HYDROCODONE BITARTRATE AND ACETAMINOPHEN 500; 5 MG/1; MG/1
TABLET ORAL
Status: DISCONTINUED | OUTPATIENT
Start: 2018-09-13 | End: 2018-09-14 | Stop reason: HOSPADM

## 2018-09-13 RX ADMIN — ACETAMINOPHEN 650 MG: 325 TABLET ORAL at 08:09

## 2018-09-13 RX ADMIN — APIXABAN 2.5 MG: 2.5 TABLET, FILM COATED ORAL at 08:09

## 2018-09-13 RX ADMIN — CHLORHEXIDINE GLUCONATE 10 ML: 1.2 RINSE ORAL at 08:09

## 2018-09-13 RX ADMIN — FLUTICASONE PROPIONATE 100 MCG: 50 SPRAY, METERED NASAL at 08:09

## 2018-09-13 RX ADMIN — GABAPENTIN 300 MG: 300 CAPSULE ORAL at 08:09

## 2018-09-13 RX ADMIN — RAMELTEON 8 MG: 8 TABLET, FILM COATED ORAL at 09:09

## 2018-09-13 RX ADMIN — INSULIN ASPART 2 UNITS: 100 INJECTION, SOLUTION INTRAVENOUS; SUBCUTANEOUS at 05:09

## 2018-09-13 RX ADMIN — STANDARDIZED SENNA CONCENTRATE AND DOCUSATE SODIUM 1 TABLET: 8.6; 5 TABLET, FILM COATED ORAL at 08:09

## 2018-09-13 RX ADMIN — PRAVASTATIN SODIUM 40 MG: 20 TABLET ORAL at 08:09

## 2018-09-13 RX ADMIN — FUROSEMIDE 20 MG: 10 INJECTION, SOLUTION INTRAVENOUS at 03:09

## 2018-09-13 RX ADMIN — OXYCODONE HYDROCHLORIDE 10 MG: 5 TABLET ORAL at 09:09

## 2018-09-13 RX ADMIN — TAMSULOSIN HYDROCHLORIDE 0.4 MG: 0.4 CAPSULE ORAL at 08:09

## 2018-09-13 RX ADMIN — ALLOPURINOL 100 MG: 100 TABLET ORAL at 08:09

## 2018-09-13 RX ADMIN — OXYCODONE HYDROCHLORIDE 10 MG: 5 TABLET ORAL at 05:09

## 2018-09-13 NOTE — PLAN OF CARE
Problem: Physical Therapy Goal  Goal: Physical Therapy Goal  PT WILL BE SEEN MINIMUM OF 5 OUT OF 7 DAYS A WEEK  LTGS: 9/19/18  1. PT WILL PERFORM BED MOBILITY WITH SUPERVISION  2. PT WILL PERFORM SIT<>STAND T/F WITH SUPERVISION  3. PT WILL GAIT TRAIN X 220' WITH RW AND TTWB WITH SUPERVISION ON INDOOR LEVEL SURFACE  4. PT WILL PERFORM THEREX B LE IN ALL PLANES X 20 REPS FOR STRENGTHENING   Outcome: Ongoing (interventions implemented as appropriate)  PT PERFORMED SEATED THEREX B LE INCLUDING APS, GLUTES SETS, TKE'S AAROM L LE, MIPS AAROM L LE X 20 REPS.

## 2018-09-13 NOTE — PLAN OF CARE
Problem: Patient Care Overview  Goal: Plan of Care Review  Outcome: Ongoing (interventions implemented as appropriate)  Patient remains free of falls and safety precautions maintained. Afebrile. Pain controlled. Patient receiving 2 units RBC, tolerating well. Dressing clean, dry, and intact. Will continue to monitor. 12 hour chart check.

## 2018-09-13 NOTE — SUBJECTIVE & OBJECTIVE
Review of Systems   Constitutional: Positive for activity change. Negative for appetite change, fatigue and fever.   HENT: Negative.    Eyes: Negative.    Respiratory: Negative.  Negative for cough, chest tightness and shortness of breath.    Cardiovascular: Negative.  Negative for chest pain, palpitations and leg swelling.   Gastrointestinal: Negative.  Negative for abdominal pain, diarrhea, nausea and vomiting.   Endocrine: Negative.    Genitourinary: Negative for dysuria, flank pain, frequency and urgency.   Musculoskeletal: Positive for arthralgias and gait problem.   Skin: Negative.    Allergic/Immunologic: Negative.    Neurological: Negative for dizziness, weakness, light-headedness and headaches.   Hematological: Negative.    Psychiatric/Behavioral: Negative.      Objective:     Vital Signs (Most Recent):  Temp: 98 °F (36.7 °C) (09/13/18 1320)  Pulse: 79 (09/13/18 1320)  Resp: 18 (09/13/18 1320)  BP: (!) 112/53 (09/13/18 1320)  SpO2: 98 % (09/13/18 1320) Vital Signs (24h Range):  Temp:  [97.8 °F (36.6 °C)-99.5 °F (37.5 °C)] 98 °F (36.7 °C)  Pulse:  [75-89] 79  Resp:  [16-20] 18  SpO2:  [94 %-100 %] 98 %  BP: ()/(50-56) 112/53     Weight: 94.4 kg (208 lb 3 oz)  Body mass index is 26.02 kg/m².    Intake/Output Summary (Last 24 hours) at 9/13/2018 1405  Last data filed at 9/13/2018 1220  Gross per 24 hour   Intake 1241.25 ml   Output 300 ml   Net 941.25 ml      Physical Exam   Constitutional: He is oriented to person, place, and time. He appears well-developed and well-nourished.   HENT:   Head: Normocephalic and atraumatic.   Eyes: Conjunctivae are normal.   Neck: Normal range of motion. Neck supple.   Cardiovascular: Normal rate, regular rhythm, normal heart sounds, intact distal pulses and normal pulses.   No murmur heard.  Pulmonary/Chest: Effort normal and breath sounds normal. No accessory muscle usage. No tachypnea. No respiratory distress.   Abdominal: Soft. Normal appearance and bowel sounds  are normal. There is no tenderness.   Musculoskeletal: Normal range of motion.   Left hip dressing C/D/I   LLE: Neurovascularly intact    Neurological: He is alert and oriented to person, place, and time. He has normal reflexes.   Skin: Skin is warm, dry and intact. Capillary refill takes less than 2 seconds.   Psychiatric: He has a normal mood and affect. His speech is normal and behavior is normal. Judgment and thought content normal. Cognition and memory are normal.   Nursing note and vitals reviewed.      Significant Labs:   CBC:   Recent Labs   Lab  09/12/18   0448  09/12/18   1512  09/13/18   0445   HGB  8.6*  8.4*  7.6*   HCT  25.5*  25.3*  22.1*     CMP: No results for input(s): NA, K, CL, CO2, GLU, BUN, CREATININE, CALCIUM, PROT, ALBUMIN, BILITOT, ALKPHOS, AST, ALT, ANIONGAP, EGFRNONAA in the last 48 hours.    Invalid input(s): ESTGFAFR[place]  All pertinent labs within the past 24 hours have been reviewed.    Significant Imaging: I have reviewed all pertinent imaging results/findings within the past 24 hours.

## 2018-09-13 NOTE — ASSESSMENT & PLAN NOTE
S/P left hip resection arthroplasty with drug-eluting implant placement along with the antibiotic beads.                            Managed per primary team    Pain controlled   PT/OT  Eliquis for DVT prophylaxis   Social work consult for discharge planning - [place] placement

## 2018-09-13 NOTE — NURSING
Patient assessed for diabetes educational needs following chart review  He reports was diagnosed over 20 years ago and has self-educated himself  He has a home glucose monitor and checks FBS daily with averages   He is consistent with taking his diabetes med's  He does not identify any diabetes educational needs at this time

## 2018-09-13 NOTE — ASSESSMENT & PLAN NOTE
Had blood loss from surgery  No signs of active bleeding  Daily H & H  Dropped from 12 >> 8>> 7.6  Continue to monitor  Transfuse 2 units PRBCs 9/13/18

## 2018-09-13 NOTE — PROGRESS NOTES
[facility] Hospital Medicine  Progress Note    Patient Name: Srinath Mcknight  MRN: [id number]  Patient Class: IP- Inpatient   Admission Date: 9/11/2018  Length of Stay: 2 days  Attending Physician: Albaro Alcocer Sr., MD  Primary Care Provider: Yeison Wilder MD        Subjective:     Principal Problem:Status post total replacement of left hip    HPI:  Srinath Mcknight is a 77 year old male with history of CAD, HTN, HLD, and DM2 who underwent left hip resection arthroplasty with drug-eluting implant placement along with the antibiotic beads per Dr. Alcocer.  Patient tolerated procedure well and was transferred to Med-Surg unit post-op.  Hospital Medicine was consulted for routine post-op medical management.  Currently, patient appears comfortable in NAD.  Denies any CP, palpitations, SOB, ABD pain, N/V/D, lightheadedness/dizziness, syncope, AMS, HA, focal deficits, fever, or chills. Pain controlled. VSS.           Hospital Course:  Pt had a previous left hip replacement that failed therefore, he underwent a resection arthroplasty with antibiotic beads per Dr. Alcocer.  Cultures were obtained and pending. Gram stains were negative for WBC - feels there was no active infection. Hgb dropped from 12 >> 8.4. He was dizzy with ambulation and B/P borderline low. Saline bolus given and H/H stable. Eliquis DVT prophylaxis in progress. Pt discharge to SNF when appropriate. 9/13/18 - Hgb/Hct 7.6/22.1 Transfused 2 units PRBCs. Pt denied further dizziness and SOB. PT/OT continued.         Review of Systems   Constitutional: Positive for activity change. Negative for appetite change, fatigue and fever.   HENT: Negative.    Eyes: Negative.    Respiratory: Negative.  Negative for cough, chest tightness and shortness of breath.    Cardiovascular: Negative.  Negative for chest pain, palpitations and leg swelling.   Gastrointestinal: Negative.  Negative for abdominal pain, diarrhea, nausea and vomiting.   Endocrine:  Negative.    Genitourinary: Negative for dysuria, flank pain, frequency and urgency.   Musculoskeletal: Positive for arthralgias and gait problem.   Skin: Negative.    Allergic/Immunologic: Negative.    Neurological: Negative for dizziness, weakness, light-headedness and headaches.   Hematological: Negative.    Psychiatric/Behavioral: Negative.      Objective:     Vital Signs (Most Recent):  Temp: 98 °F (36.7 °C) (09/13/18 1320)  Pulse: 79 (09/13/18 1320)  Resp: 18 (09/13/18 1320)  BP: (!) 112/53 (09/13/18 1320)  SpO2: 98 % (09/13/18 1320) Vital Signs (24h Range):  Temp:  [97.8 °F (36.6 °C)-99.5 °F (37.5 °C)] 98 °F (36.7 °C)  Pulse:  [75-89] 79  Resp:  [16-20] 18  SpO2:  [94 %-100 %] 98 %  BP: ()/(50-56) 112/53     Weight: 94.4 kg (208 lb 3 oz)  Body mass index is 26.02 kg/m².    Intake/Output Summary (Last 24 hours) at 9/13/2018 1405  Last data filed at 9/13/2018 1220  Gross per 24 hour   Intake 1241.25 ml   Output 300 ml   Net 941.25 ml      Physical Exam   Constitutional: He is oriented to person, place, and time. He appears well-developed and well-nourished.   HENT:   Head: Normocephalic and atraumatic.   Eyes: Conjunctivae are normal.   Neck: Normal range of motion. Neck supple.   Cardiovascular: Normal rate, regular rhythm, normal heart sounds, intact distal pulses and normal pulses.   No murmur heard.  Pulmonary/Chest: Effort normal and breath sounds normal. No accessory muscle usage. No tachypnea. No respiratory distress.   Abdominal: Soft. Normal appearance and bowel sounds are normal. There is no tenderness.   Musculoskeletal: Normal range of motion.   Left hip dressing C/D/I   LLE: Neurovascularly intact    Neurological: He is alert and oriented to person, place, and time. He has normal reflexes.   Skin: Skin is warm, dry and intact. Capillary refill takes less than 2 seconds.   Psychiatric: He has a normal mood and affect. His speech is normal and behavior is normal. Judgment and thought content  normal. Cognition and memory are normal.   Nursing note and vitals reviewed.      Significant Labs:   CBC:   Recent Labs   Lab  09/12/18   0448  09/12/18   1512  09/13/18   0445   HGB  8.6*  8.4*  7.6*   HCT  25.5*  25.3*  22.1*     CMP: No results for input(s): NA, K, CL, CO2, GLU, BUN, CREATININE, CALCIUM, PROT, ALBUMIN, BILITOT, ALKPHOS, AST, ALT, ANIONGAP, EGFRNONAA in the last 48 hours.    Invalid input(s): ESTGFAFR[place]  All pertinent labs within the past 24 hours have been reviewed.    Significant Imaging: I have reviewed all pertinent imaging results/findings within the past 24 hours.    Assessment/Plan:      * Status post total replacement of left hip    S/P left hip resection arthroplasty with drug-eluting implant placement along with the antibiotic beads.                            Managed per primary team    Pain controlled   PT/OT  Eliquis for DVT prophylaxis   Social work consult for discharge planning - [place] placement          Acute blood loss anemia    Had blood loss from surgery  No signs of active bleeding  Daily H & H  Dropped from 12 >> 8>> 7.6  Continue to monitor  Transfuse 2 units PRBCs 9/13/18          Essential hypertension    Resume home meds  Monitor           Coronary artery disease, occlusive    Stable   Will resume current medical management           Combined hyperlipidemia associated with type 2 diabetes mellitus    Resume statin   Accucheck and SSI low dose             VTE Risk Mitigation (From admission, onward)        Ordered     apixaban tablet 2.5 mg  2 times daily      09/11/18 1438     IP VTE HIGH RISK PATIENT  Once      09/11/18 1438     Place BA hose  Until discontinued      09/11/18 1438     Place sequential compression device  Until discontinued      09/11/18 1438     Place BA hose  Until discontinued      09/11/18 0914     Place sequential compression device  Until discontinued      09/11/18 0914              Cyndi Dave NP  Department of Hospital Medicine    [facility]

## 2018-09-13 NOTE — PT/OT/SLP PROGRESS
Physical Therapy  Treatment    Srinath Mcknight   MRN: [id number]   Admitting Diagnosis: Status post total replacement of left hip    PT Received On: 09/13/18  PT Start Time: 0841     PT Stop Time: 0904    PT Total Time (min): 23 min       Billable Minutes:  Therapeutic Exercise 23    Treatment Type: Treatment  PT/PTA: PT             General Precautions: Standard, fall  Orthopedic Precautions: LLE toe touch weight bearing   Braces: N/A         Subjective:  Communicated with NURSE SHAHZAD AND Epic CHART REVIEW prior to session.  SPOKE WITH THE NURSE BEFORE TX AND SHE EXPLAINED HIS LATEST BP WHILE SITTING AND COULD NOT TOLERATE ANY STANDING ACTIVITY TODAY.     Pain/Comfort  Pain Rating 1: 7/10  Location - Side 1: Left  Location 1: hip  Pain Addressed 1: Pre-medicate for activity    Objective:   Patient found with: SCD    Functional Mobility:  PT FOUND SITTING UPRIGHT IN BEDSIDE CHAIR WITH FAMILY AT BEDSIDE. PT C/O DIZZINESS AND SOB. PT REMAINED SEATED FOR THEREX B LE INCLUDING APS, GLUTES SETS, TKE'S AAROM L LE, MIPS AAROM L LE X 20 REPS. PT RECALLED 3/3 ANTEROLATERAL HIP PRECAUTIONS. CALL BELL IN REACH AND ALL NEEDS MET.   AM-PAC 6 CLICK MOBILITY  How much help from another person does this patient currently need?   1 = Unable, Total/Dependent Assistance  2 = A lot, Maximum/Moderate Assistance  3 = A little, Minimum/Contact Guard/Supervision  4 = None, Modified [place]/Independent    Turning over in bed (including adjusting bedclothes, sheets and blankets)?: 1  Sitting down on and standing up from a chair with arms (e.g., wheelchair, bedside commode, etc.): 1  Moving from lying on back to sitting on the side of the bed?: 1  Moving to and from a bed to a chair (including a wheelchair)?: 1  Need to walk in hospital room?: 1  Climbing 3-5 steps with a railing?: 1  Basic Mobility Total Score: 6    AM-PAC Raw Score CMS G-Code Modifier Level of Impairment Assistance   6 % Total / Unable   7 - 9 CM 80 - 100%  Maximal Assist   10 - 14 CL 60 - 80% Moderate Assist   15 - 19 CK 40 - 60% Moderate Assist   20 - 22 CJ 20 - 40% Minimal Assist   23 CI 1-20% SBA / CGA   24 CH 0% Independent/ Mod I     Patient left up in chair with call button in reach and FAMILY present.    Assessment:  PT TOLERATED P.T. WELL IN SITTING TODAY. P.T. WILL ATTEMPT TO STAND IF APPROPRIATE FOR PT NEXT TX    Rehab identified problem list/impairments: Rehab identified problem list/impairments: weakness, pain, decreased ROM, impaired endurance, impaired functional mobilty    Rehab potential is good.    Activity tolerance: Fair    Discharge recommendations: Discharge Facility/Level Of Care Needs: nursing facility, skilled     Barriers to discharge:      Equipment recommendations: Equipment Needed After Discharge: none     GOALS:   Multidisciplinary Problems     Physical Therapy Goals        Problem: Physical Therapy Goal    Goal Priority Disciplines Outcome Goal Variances Interventions   Physical Therapy Goal     PT, PT/OT Ongoing (interventions implemented as appropriate)     Description:  PT WILL BE SEEN MINIMUM OF 5 OUT OF 7 DAYS A WEEK  LTGS: 9/19/18  1. PT WILL PERFORM BED MOBILITY WITH SUPERVISION  2. PT WILL PERFORM SIT<>STAND T/F WITH SUPERVISION  3. PT WILL GAIT TRAIN X 220' WITH RW AND TTWB WITH SUPERVISION ON INDOOR LEVEL SURFACE  4. PT WILL PERFORM THEREX B LE IN ALL PLANES X 20 REPS FOR STRENGTHENING                    PLAN:    Patient to be seen 5 x/week  to address the above listed problems via gait training, therapeutic activities, therapeutic exercises  Plan of Care expires: 09/19/18  Plan of Care reviewed with: patient, family     PT EDUCATED ON IMPORTANCE OF CALLING NURSE WHEN READY TO RETURN TO BED D/T FALL RISK. PT VERBALIZED UNDERSTANDING.     Kusum Martínez, SPT  09/13/2018

## 2018-09-13 NOTE — PLAN OF CARE
Problem: Infection, Risk/Actual (Adult)  Intervention: Prevent Infection/Maximize Resistance  Pt had no adverse events during shift. Pain well controlled w/ PRN PO meds. Pt repositions in bed w/ minimal assistance. Abductor pillow in place. Blood glucose monitoring performed. VSS. Neurovascular checks done q4. IVF administered as ordered. Chart reviewed, will continue to monitor.

## 2018-09-13 NOTE — PT/OT/SLP PROGRESS
Physical Therapy      Patient Name:  Srinath Mcknight   MRN:  [id number]    S: PT AGREEABLE TO SEATED BLE THEREX, NO C/O DIZZINESS, NO C/O PAIN  O: PT FOUND SEATED IN CHAIR UPON ARRIVAL, RECEIVING BLOOD TRANSFUSION,  PT EDUCATED IN AND PERFORMED BLE THEREX X 20 REPS AROM FOR RLE, AAROM FOR LLE WITH REST AS NEEDED.   PT LEFT SEATED WITH ALL NEEDS MET.  PT ENCOURAGED TO CALL FOR ASSISTANCE WITH ALL NEEDS DUE TO FALL RISK STATUS, PT AGREEABLE  A: GOOD PARTICIPATION  P: CONT PER POC    Sherri Little, PT   9/13/2018  [phone]

## 2018-09-14 VITALS
RESPIRATION RATE: 16 BRPM | WEIGHT: 208.19 LBS | SYSTOLIC BLOOD PRESSURE: 119 MMHG | OXYGEN SATURATION: 93 % | BODY MASS INDEX: 25.88 KG/M2 | HEIGHT: 75 IN | HEART RATE: 77 BPM | DIASTOLIC BLOOD PRESSURE: 57 MMHG | TEMPERATURE: 98 F

## 2018-09-14 PROBLEM — D62 ACUTE BLOOD LOSS ANEMIA: Status: RESOLVED | Noted: 2017-10-10 | Resolved: 2018-09-14

## 2018-09-14 LAB
ANION GAP SERPL CALC-SCNC: 11 MMOL/L
BASOPHILS # BLD AUTO: 0.01 K/UL
BASOPHILS NFR BLD: 0.1 %
BUN SERPL-MCNC: 20 MG/DL
CALCIUM SERPL-MCNC: 8.1 MG/DL
CHLORIDE SERPL-SCNC: 107 MMOL/L
CO2 SERPL-SCNC: 23 MMOL/L
CREAT SERPL-MCNC: 1.3 MG/DL
DIFFERENTIAL METHOD: ABNORMAL
EOSINOPHIL # BLD AUTO: 0.2 K/UL
EOSINOPHIL NFR BLD: 2.8 %
ERYTHROCYTE [DISTWIDTH] IN BLOOD BY AUTOMATED COUNT: 14.5 %
EST. GFR  (AFRICAN AMERICAN): >60 ML/MIN/1.73 M^2
EST. GFR  (NON AFRICAN AMERICAN): 53 ML/MIN/1.73 M^2
GLUCOSE SERPL-MCNC: 149 MG/DL
HCT VFR BLD AUTO: 26.4 %
HGB BLD-MCNC: 9.1 G/DL
LYMPHOCYTES # BLD AUTO: 1.8 K/UL
LYMPHOCYTES NFR BLD: 24.9 %
MCH RBC QN AUTO: 28.5 PG
MCHC RBC AUTO-ENTMCNC: 34.5 G/DL
MCV RBC AUTO: 83 FL
MONOCYTES # BLD AUTO: 0.8 K/UL
MONOCYTES NFR BLD: 11.9 %
NEUTROPHILS # BLD AUTO: 4.3 K/UL
NEUTROPHILS NFR BLD: 60.3 %
PLATELET # BLD AUTO: 129 K/UL
PMV BLD AUTO: 9.5 FL
POCT GLUCOSE: 157 MG/DL (ref 70–110)
POCT GLUCOSE: 189 MG/DL (ref 70–110)
POTASSIUM SERPL-SCNC: 3 MMOL/L
RBC # BLD AUTO: 3.19 M/UL
SODIUM SERPL-SCNC: 141 MMOL/L
WBC # BLD AUTO: 7.06 K/UL

## 2018-09-14 PROCEDURE — 3E0234Z INTRODUCTION OF SERUM, TOXOID AND VACCINE INTO MUSCLE, PERCUTANEOUS APPROACH: ICD-10-PCS | Performed by: INTERNAL MEDICINE

## 2018-09-14 PROCEDURE — 97116 GAIT TRAINING THERAPY: CPT

## 2018-09-14 PROCEDURE — 85025 COMPLETE CBC W/AUTO DIFF WBC: CPT

## 2018-09-14 PROCEDURE — 25000003 PHARM REV CODE 250: Performed by: NURSE PRACTITIONER

## 2018-09-14 PROCEDURE — 97110 THERAPEUTIC EXERCISES: CPT

## 2018-09-14 PROCEDURE — 25000003 PHARM REV CODE 250: Performed by: ORTHOPAEDIC SURGERY

## 2018-09-14 PROCEDURE — 36415 COLL VENOUS BLD VENIPUNCTURE: CPT

## 2018-09-14 PROCEDURE — 63600175 PHARM REV CODE 636 W HCPCS: Performed by: ORTHOPAEDIC SURGERY

## 2018-09-14 PROCEDURE — 80048 BASIC METABOLIC PNL TOTAL CA: CPT

## 2018-09-14 RX ORDER — FERROUS SULFATE 325(65) MG
325 TABLET, DELAYED RELEASE (ENTERIC COATED) ORAL DAILY
Status: DISCONTINUED | OUTPATIENT
Start: 2018-09-14 | End: 2018-09-14 | Stop reason: HOSPADM

## 2018-09-14 RX ORDER — ASPIRIN 81 MG/1
81 TABLET ORAL DAILY
Refills: 0 | COMMUNITY
Start: 2018-09-14 | End: 2022-12-16

## 2018-09-14 RX ORDER — POTASSIUM CHLORIDE 20 MEQ/1
60 TABLET, EXTENDED RELEASE ORAL ONCE
Status: COMPLETED | OUTPATIENT
Start: 2018-09-14 | End: 2018-09-14

## 2018-09-14 RX ORDER — OXYCODONE AND ACETAMINOPHEN 10; 325 MG/1; MG/1
1 TABLET ORAL EVERY 4 HOURS PRN
Qty: 12 TABLET | Refills: 0 | Status: SHIPPED | OUTPATIENT
Start: 2018-09-14 | End: 2018-11-07

## 2018-09-14 RX ADMIN — ALLOPURINOL 100 MG: 100 TABLET ORAL at 08:09

## 2018-09-14 RX ADMIN — METOPROLOL SUCCINATE 50 MG: 50 TABLET, EXTENDED RELEASE ORAL at 08:09

## 2018-09-14 RX ADMIN — TAMSULOSIN HYDROCHLORIDE 0.4 MG: 0.4 CAPSULE ORAL at 08:09

## 2018-09-14 RX ADMIN — FERROUS SULFATE TAB EC 325 MG (65 MG FE EQUIVALENT) 325 MG: 325 (65 FE) TABLET DELAYED RESPONSE at 09:09

## 2018-09-14 RX ADMIN — POTASSIUM CHLORIDE 60 MEQ: 1500 TABLET, EXTENDED RELEASE ORAL at 09:09

## 2018-09-14 RX ADMIN — INFLUENZA A VIRUS A/MICHIGAN/45/2015 X-275 (H1N1) ANTIGEN (FORMALDEHYDE INACTIVATED), INFLUENZA A VIRUS A/SINGAPORE/INFIMH-16-0019/2016 IVR-186 (H3N2) ANTIGEN (FORMALDEHYDE INACTIVATED), AND INFLUENZA B VIRUS B/MARYLAND/15/2016 BX-69A (A B/COLORADO/6/2017-LIKE VIRUS) ANTIGEN (FORMALDEHYDE INACTIVATED) 0.5 ML: 60; 60; 60 INJECTION, SUSPENSION INTRAMUSCULAR at 10:09

## 2018-09-14 RX ADMIN — APIXABAN 2.5 MG: 2.5 TABLET, FILM COATED ORAL at 08:09

## 2018-09-14 RX ADMIN — STANDARDIZED SENNA CONCENTRATE AND DOCUSATE SODIUM 1 TABLET: 8.6; 5 TABLET, FILM COATED ORAL at 08:09

## 2018-09-14 RX ADMIN — PRAVASTATIN SODIUM 40 MG: 20 TABLET ORAL at 08:09

## 2018-09-14 RX ADMIN — GABAPENTIN 300 MG: 300 CAPSULE ORAL at 08:09

## 2018-09-14 RX ADMIN — CHLORHEXIDINE GLUCONATE 10 ML: 1.2 RINSE ORAL at 08:09

## 2018-09-14 RX ADMIN — FLUTICASONE PROPIONATE 100 MCG: 50 SPRAY, METERED NASAL at 08:09

## 2018-09-14 NOTE — PT/OT/SLP PROGRESS
Physical Therapy  Treatment    Srinath Mcknight   MRN: [id number]   Admitting Diagnosis: Status post total replacement of left hip    PT Received On: 09/14/18  PT Start Time: 0850     PT Stop Time: 0915    PT Total Time (min): 25 min       Billable Minutes:  Gait Training 15 and Therapeutic Exercise 10    Treatment Type: Treatment  PT/PTA: PT             General Precautions: Standard, fall  Orthopedic Precautions: LLE toe touch weight bearing   Braces: N/A         Subjective:  Communicated with NURSE YAO AND [facility] CHART REVIEW prior to session.  PT FOUND SUPINE IN BED, HOB ELEVATED    Pain/Comfort  Pain Rating 1: 2/10  Location - Side 1: Left  Location - Orientation 1: lower  Location 1: hip    Objective:   Patient found with: SCD    Functional Mobility:  PT FOUND SUPINE, HOB ELEVATED, AND AGREEABLE TO P.T. PT'S HOB LOWERED COMPLETELY AND PT SUP>SIT MIN A WITH L LE TO EOB AND NO C/O DIZZINESS. PT SIT>STAND MIN A WITH RW FOR B UE SUPPORT AND NO C/O DIZZINESS. PT GAIT TRAINED X 140' WITH RW AND CGA.  PT RETURNED TO ROOM AND T/F TO BEDSIDE CHAIR WITH CGA TO PERFORM SEATED THEREX B LE INCLUDING APS, GLUTE SETS, TKE AAROM L LE, MIP AAROM L LE X 20 REPETITIONS. CALL BELL IN REACH AND ALL NEEDS MET.     AM-PAC 6 CLICK MOBILITY  How much help from another person does this patient currently need?   1 = Unable, Total/Dependent Assistance  2 = A lot, Maximum/Moderate Assistance  3 = A little, Minimum/Contact Guard/Supervision  4 = None, Modified [place]/Independent    Turning over in bed (including adjusting bedclothes, sheets and blankets)?: 3  Sitting down on and standing up from a chair with arms (e.g., wheelchair, bedside commode, etc.): 3  Moving from lying on back to sitting on the side of the bed?: 3  Moving to and from a bed to a chair (including a wheelchair)?: 3  Need to walk in hospital room?: 4  Climbing 3-5 steps with a railing?: 1  Basic Mobility Total Score: 17    AM-PAC Raw Score CMS G-Code Modifier Level of  Impairment Assistance   6 % Total / Unable   7 - 9 CM 80 - 100% Maximal Assist   10 - 14 CL 60 - 80% Moderate Assist   15 - 19 CK 40 - 60% Moderate Assist   20 - 22 CJ 20 - 40% Minimal Assist   23 CI 1-20% SBA / CGA   24 CH 0% Independent/ Mod I     Patient left up in chair with call button in reach.    Assessment:  PT CONTINUES TO PROGRESS WITH P.T. AND WILL BENEFIT FROM ADDITIONAL P.T. TX TO ADDRESS IMPAIRMENTS.     Rehab identified problem list/impairments: Rehab identified problem list/impairments: weakness, gait instability, impaired endurance, impaired balance, decreased lower extremity function, decreased safety awareness, pain, impaired functional mobilty    Rehab potential is good.    Activity tolerance: Good    Discharge recommendations: Discharge Facility/Level Of Care Needs: nursing facility, skilled     Barriers to discharge:      Equipment recommendations: Equipment Needed After Discharge: none     GOALS:   Multidisciplinary Problems     Physical Therapy Goals        Problem: Physical Therapy Goal    Goal Priority Disciplines Outcome Goal Variances Interventions   Physical Therapy Goal     PT, PT/OT Ongoing (interventions implemented as appropriate)     Description:  PT WILL BE SEEN MINIMUM OF 5 OUT OF 7 DAYS A WEEK  LTGS: 9/19/18  1. PT WILL PERFORM BED MOBILITY WITH SUPERVISION  2. PT WILL PERFORM SIT<>STAND T/F WITH SUPERVISION  3. PT WILL GAIT TRAIN X 220' WITH RW AND TTWB WITH SUPERVISION ON INDOOR LEVEL SURFACE  4. PT WILL PERFORM THEREX B LE IN ALL PLANES X 20 REPS FOR STRENGTHENING                    PLAN:    Patient to be seen 5 x/week  to address the above listed problems via gait training, therapeutic activities, therapeutic exercises  Plan of Care expires: 09/19/18  Plan of Care reviewed with: patient     PT EDUCATED TO CALL NURSE WHEN READY TO RETURN TO BED D/T FALL RISK. PT VERBALIZED UNDERSTANDING.    Kusum Martínez, SPT  09/14/2018

## 2018-09-14 NOTE — NURSING
Pt midnight vitals abnormal. Rechecked VS. O2-93%. Encouraged turn cough, deep breathe and I/S use. Pt has thermostat set to 80. Decreased room temp. Will continue to monitor.

## 2018-09-14 NOTE — PLAN OF CARE
Problem: Patient Care Overview  Goal: Plan of Care Review  Outcome: Ongoing (interventions implemented as appropriate)  Pt had no adverse events during shift. Pain well controlled w/ PRN PO meds. Pt repositions in bed w/ minimal assistance using wedge. Abductor pillow and SCD's in place. Pt ambulated to bathroom during shift w/ walker and assistance. Blood glucose monitoring performed. VSS. Neurovascular checks done q4. Chart reviewed, will continue to monitor.

## 2018-09-14 NOTE — PLAN OF CARE
09/14/18 0930   Medicare Message   Important Message from Medicare regarding Discharge Appeal Rights Given to patient/caregiver;Signed/date by patient/caregiver;Explained to patient/caregiver   Date IMM was signed 09/14/18   Time IMM was signed 0930

## 2018-09-14 NOTE — PLAN OF CARE
Message received from Susana Astudillo Admissions at [facility]. They have accepted the patient and have received insurance authorization. Notified Dr Alcocer, Dr Smalls and Cyndi Dave NP and requested dc orders if appropriate.     @ 1000 Discharge orders noted. Requested [facility] primary nurse to review and complete the AVS so that ir can be faxed with discharge paperwork to [facility]. Once [facility] receives all paperwork , and verbal report they will arrange transportation.      @ 1048 All appropriate discharge paperwork faxed to [facility]. Update to patient. Discharge packet given to [facility].

## 2018-09-14 NOTE — NURSING
Message sent to Dr. Alcocer's staff to schedule appointment and contact patient with appointment information.

## 2018-09-14 NOTE — PLAN OF CARE
Problem: Patient Care Overview  Goal: Plan of Care Review  Outcome: Ongoing (interventions implemented as appropriate)  Remained free from injury. Denies pain. Tolerating diet. Voiding without difficulty. To be discharged. Chart check complete.       no fever

## 2018-09-14 NOTE — PLAN OF CARE
09/14/18 1435   Final Note   Assessment Type Final Discharge Note   Discharge Disposition SNF   Right Care Referral Info   Post Acute Recommendation SNF / Sub-Acute Rehab   Facility Name [facility]

## 2018-09-14 NOTE — DISCHARGE SUMMARY
[facility] Hospital Medicine  Discharge Summary      Patient Name: Srinath Mcknight  MRN: [id number]  Admission Date: 9/11/2018  Hospital Length of Stay: 3 days  Discharge Date and Time:  09/14/2018 2:36 PM  Attending Physician: Saida Smalls MD  Discharging Provider: Cyndi Dave NP  Primary Care Provider: eYison Wilder MD      HPI:   Srinath Mcknight is a 77 year old male with history of CAD, HTN, HLD, and DM2 who underwent left hip resection arthroplasty with drug-eluting implant placement along with the antibiotic beads per Dr. Alcocer.  Patient tolerated procedure well and was transferred to Med-Surg unit post-op.  Hospital Medicine was consulted for routine post-op medical management.  Currently, patient appears comfortable in NAD.  Denies any CP, palpitations, SOB, ABD pain, N/V/D, lightheadedness/dizziness, syncope, AMS, HA, focal deficits, fever, or chills. Pain controlled. VSS.           Procedure(s) (LRB):  REVISION, TOTAL ARTHROPLASTY, HIP (Left)      Hospital Course:   Pt had a previous left hip replacement that failed therefore, he underwent a resection arthroplasty with antibiotic beads per Dr. Alcocer.  Cultures were obtained and pending at time of discharge.  Gram stains were negative for WBC - Dr. Alcocer felt there was no active infection. Hgb dropped from 12 >> 8.4. He was dizzy with ambulation and B/P borderline low. Saline bolus given and H/H stable. Eliquis DVT prophylaxis initiated for 35 days. 9/13/18 - Hgb/Hct 7.6/22.1 Transfused 2 units PRBCs. Pt denied further dizziness and SOB. PT/OT continued. Pt with appropriate urinary output and + bowel movement. He tolerated PT/OT. He was medically stable for discharge and seen and at time of discharge and deemed appropriate. H/H was stable. Pt was discharged to [facility]. Eliquis DVT prophylaxis was prescribed for 35 days.      Consults:   Consults (From admission, onward)        Status Ordering Provider     Inpatient  consult to Social Work  Once     Provider:  (Not yet assigned)    Completed LILY MORENO     Inpatient consult to Social Work/Case Management  Once     Provider:  (Not yet assigned)    Completed MARLO BAUTISTA SR     IP consult to case management  Once     Provider:  (Not yet assigned)    Completed MARLO BAUTISTA SR          No new Assessment & Plan notes have been filed under this hospital service since the last note was generated.  Service: Hospital Medicine    Final Active Diagnoses:    Diagnosis Date Noted POA    PRINCIPAL PROBLEM:  Status post total replacement of left hip [Z96.642] 09/12/2018 Not Applicable    Essential hypertension [I10] 07/21/2014 Yes     Chronic    Coronary artery disease, occlusive [I25.10] 07/21/2014 Yes    Combined hyperlipidemia associated with type 2 diabetes mellitus [E11.69, E78.2] 07/25/2013 Yes     Chronic      Problems Resolved During this Admission:    Diagnosis Date Noted Date Resolved POA    Acute blood loss anemia [D62] 10/10/2017 09/14/2018 Yes       Discharged Condition: stable    Disposition: Skilled Nursing Facility    Follow Up:  Follow-up Information     [facility].    Why:  SNF  Contact information:  [address] [address] [phone]             Marlo Bautista Sr, MD In 2 weeks.    Specialty:  Orthopedic Surgery  Why:  Follow up with Dr. Bautista in reference to your hip replacement  Contact information:  [address] [address] [phone]                 Patient Instructions:      Notify your health care provider if you experience any of the following:  temperature >100.4     Notify your health care provider if you experience any of the following:  persistent nausea and vomiting or diarrhea     Notify your health care provider if you experience any of the following:  severe uncontrolled pain     Notify your health care provider if you experience any of the following:  persistent dizziness,  light-headedness, or visual disturbances     Activity as tolerated       Significant Diagnostic Studies: Labs:   CMP   Recent Labs   Lab  09/14/18   0451   NA  141   K  3.0*   CL  107   CO2  23   GLU  149*   BUN  20   CREATININE  1.3   CALCIUM  8.1*   ANIONGAP  11   ESTGFRAFRICA  >60   EGFRNONAA  53*    and CBC   Recent Labs   Lab  09/12/18   1512  09/13/18   0445  09/14/18   0451   WBC   --    --   7.06   HGB  8.4*  7.6*  9.1*   HCT  25.3*  22.1*  26.4*   PLT   --    --   129*       Pending Diagnostic Studies:     None         Medications:  Reconciled Home Medications:      Medication List      START taking these medications    apixaban 2.5 mg Tab  Commonly known as:  ELIQUIS  Take 1 tablet (2.5 mg total) by mouth 2 (two) times daily.     nozaseptin nasal   Commonly known as:  NOZIN  1 each by Each Nare route 2 (two) times daily. Complete supply provided by hospital     oxyCODONE-acetaminophen  mg per tablet  Commonly known as:  PERCOCET  Take 1 tablet by mouth every 4 (four) hours as needed for Pain.        CHANGE how you take these medications    colchicine 0.6 mg tablet  Commonly known as:  COLCRYS  Take 2 tablets once, then 1 tablet in 1 hour.  What changed:    · how much to take  · how to take this  · additional instructions     pravastatin 40 MG tablet  Commonly known as:  PRAVACHOL  TAKE 1 TABLET EVERY DAY  What changed:  See the new instructions.        CONTINUE taking these medications    allopurinol 100 MG tablet  Commonly known as:  ZYLOPRIM  Take 1 tablet (100 mg total) by mouth once daily.     aspirin 81 MG EC tablet  Commonly known as:  ECOTRIN  Take 1 tablet (81 mg total) by mouth once daily.     blood sugar diagnostic Strp  Commonly known as:  ACCU-CHEK FRANKO PLUS TEST STRP  USE  1  STRIP ONE TIME DAILY     blood-glucose meter Misc  Commonly known as:  ACCU-CHEK FRANKO PLUS METER  1 Device by Misc.(Non-Drug; Combo Route) route once daily.     celecoxib 200 MG capsule  Commonly known  as:  CeleBREX  TAKE 1 CAPSULE(200 MG) BY MOUTH EVERY DAY     docusate sodium 100 MG capsule  Commonly known as:  COLACE  Take 1 capsule (100 mg total) by mouth 2 (two) times daily as needed.     ferrous sulfate 324 mg (65 mg iron) Tbec  Take 325 mg by mouth once daily.     fluticasone 50 mcg/actuation nasal spray  Commonly known as:  FLONASE  2 sprays (100 mcg total) by Each Nare route once daily.     gabapentin 300 MG capsule  Commonly known as:  NEURONTIN  Take 300 mg by mouth 2 (two) times daily.     GARLIC EXTRACT ORAL  Take by mouth once daily.     glipiZIDE 5 MG tablet  Commonly known as:  GLUCOTROL  TAKE 2 TABLETS (10 MG) TWO TIMES DAILY BEFORE MEALS     lancets Misc  Commonly known as:  ACCU-CHEK SOFTCLIX LANCETS  1 lancet by Misc.(Non-Drug; Combo Route) route once daily.     losartan-hydrochlorothiazide 50-12.5 mg 50-12.5 mg per tablet  Commonly known as:  HYZAAR  TAKE 1 TABLET EVERY DAY     metFORMIN 1000 MG tablet  Commonly known as:  GLUCOPHAGE  TAKE 1 TABLET TWICE DAILY WITH MEALS     metoprolol succinate 50 MG 24 hr tablet  Commonly known as:  TOPROL-XL  TAKE 1 TABLET EVERY DAY     ONE DAILY MULTIVITAMIN per tablet  Generic drug:  multivitamin  Take 1 tablet by mouth once daily.     tamsulosin 0.4 mg Cap  Commonly known as:  FLOMAX  Take 1 capsule (0.4 mg total) by mouth once daily.        STOP taking these medications    acetaminophen 650 MG Tbsr  Commonly known as:  TYLENOL     BOOSTRIX TDAP 2.5-8-5 Lf-mcg-Lf/0.5mL Syrg injection  Generic drug:  diphth,pertus(acell),tetanus     cetirizine 10 MG tablet  Commonly known as:  ZYRTEC     clonazePAM 0.25 MG Tbdl  Commonly known as:  KLONOPIN     sildenafil 100 MG tablet  Commonly known as:  VIAGRA     zonisamide 50 MG Cap  Commonly known as:  ZONEGRAN            Indwelling Lines/Drains at time of discharge:   Lines/Drains/Airways          None          Time spent on the discharge of patient: > 30 minutes  Patient was seen and examined on the date of  discharge and determined to be suitable for discharge.         Cyndi Dave NP  Department of Hospital Medicine  [facility]

## 2018-09-14 NOTE — PLAN OF CARE
Problem: Physical Therapy Goal  Goal: Physical Therapy Goal  PT WILL BE SEEN MINIMUM OF 5 OUT OF 7 DAYS A WEEK  LTGS: 9/19/18  1. PT WILL PERFORM BED MOBILITY WITH SUPERVISION  2. PT WILL PERFORM SIT<>STAND T/F WITH SUPERVISION  3. PT WILL GAIT TRAIN X 220' WITH RW AND TTWB WITH SUPERVISION ON INDOOR LEVEL SURFACE  4. PT WILL PERFORM THEREX B LE IN ALL PLANES X 20 REPS FOR STRENGTHENING   Outcome: Ongoing (interventions implemented as appropriate)  PT GAIT TRAINED X 140' WITH RW AND CGA.

## 2018-09-15 LAB
BACTERIA SPEC AEROBE CULT: NO GROWTH
BACTERIA SPEC AEROBE CULT: NO GROWTH

## 2018-09-17 ENCOUNTER — PATIENT OUTREACH (OUTPATIENT)
Dept: ADMINISTRATIVE | Facility: CLINIC | Age: 78
End: 2018-09-17

## 2018-09-17 LAB
BACTERIA SPEC ANAEROBE CULT: NORMAL
BACTERIA SPEC ANAEROBE CULT: NORMAL

## 2018-09-18 ENCOUNTER — TELEPHONE (OUTPATIENT)
Dept: ORTHOPEDICS | Facility: CLINIC | Age: 78
End: 2018-09-18

## 2018-09-18 DIAGNOSIS — M25.552 PAIN OF LEFT HIP JOINT: Primary | ICD-10-CM

## 2018-09-18 NOTE — TELEPHONE ENCOUNTER
I got the patient scheduled for his first post op appointment with Dr. Alcocer on 9/25/18 at 1:00 with 12:30 x-rays.FP        ----- Message from Sirena Platt sent at 9/18/2018 11:23 AM CDT -----  Contact: Sandy/[facility]  Sandy called to schedule the patient's 2 week f/u after his hip surgery. She stated he had it done on 9/11/18 and he is to see Dr. Alcocer. Please call her at [phone].    Thanks,  Sirena

## 2018-09-25 ENCOUNTER — HOSPITAL ENCOUNTER (OUTPATIENT)
Dept: RADIOLOGY | Facility: HOSPITAL | Age: 78
Discharge: HOME | End: 2018-09-25
Attending: ORTHOPAEDIC SURGERY
Payer: MEDICARE

## 2018-09-25 ENCOUNTER — OFFICE VISIT (OUTPATIENT)
Dept: ORTHOPEDICS | Facility: CLINIC | Age: 78
End: 2018-09-25
Payer: MEDICARE

## 2018-09-25 VITALS
SYSTOLIC BLOOD PRESSURE: 108 MMHG | WEIGHT: 208 LBS | DIASTOLIC BLOOD PRESSURE: 62 MMHG | BODY MASS INDEX: 25.86 KG/M2 | HEART RATE: 77 BPM | HEIGHT: 75 IN

## 2018-09-25 DIAGNOSIS — M25.552 PAIN OF LEFT HIP JOINT: Primary | ICD-10-CM

## 2018-09-25 DIAGNOSIS — M25.552 PAIN OF LEFT HIP JOINT: ICD-10-CM

## 2018-09-25 DIAGNOSIS — Z98.890 POSTOPERATIVE STATE: Primary | ICD-10-CM

## 2018-09-25 PROCEDURE — 99499 UNLISTED E&M SERVICE: CPT | Mod: HCNC,S$GLB,, | Performed by: ORTHOPAEDIC SURGERY

## 2018-09-25 PROCEDURE — 99213 OFFICE O/P EST LOW 20 MIN: CPT | Mod: PBBFAC,25,PO | Performed by: ORTHOPAEDIC SURGERY

## 2018-09-25 PROCEDURE — 99024 POSTOP FOLLOW-UP VISIT: CPT | Mod: ,,, | Performed by: ORTHOPAEDIC SURGERY

## 2018-09-25 PROCEDURE — 73502 X-RAY EXAM HIP UNI 2-3 VIEWS: CPT | Mod: 26,LT,, | Performed by: RADIOLOGY

## 2018-09-25 PROCEDURE — 73502 X-RAY EXAM HIP UNI 2-3 VIEWS: CPT | Mod: TC,FY,PO,LT

## 2018-09-25 PROCEDURE — 99999 PR PBB SHADOW E&M-EST. PATIENT-LVL III: CPT | Mod: PBBFAC,,, | Performed by: ORTHOPAEDIC SURGERY

## 2018-09-25 RX ORDER — MINOCYCLINE HYDROCHLORIDE 100 MG/1
100 CAPSULE ORAL 2 TIMES DAILY
Qty: 60 CAPSULE | Refills: 1 | Status: SHIPPED | OUTPATIENT
Start: 2018-09-25 | End: 2018-10-09 | Stop reason: ALTCHOICE

## 2018-09-25 RX ORDER — RIFAMPIN 300 MG/1
300 CAPSULE ORAL DAILY
Qty: 30 CAPSULE | Refills: 1 | Status: SHIPPED | OUTPATIENT
Start: 2018-09-25 | End: 2018-10-09 | Stop reason: ALTCHOICE

## 2018-09-25 NOTE — PROGRESS NOTES
SUBJECTIVE:  Patient is status post Left total hip revision.  Patient states that he is much improved.  Patient complains of  2/ 10 pain that is better with the  pain meds and aggravated with movement.    Past Medical History:   Diagnosis Date    Anemia due to unknown mechanism 11/10/2015    Angina pectoris 2014    not since    Arthritis     Back pain     Coronary artery disease     Encounter for blood transfusion     Gout 12/05/2017    right foot     Hyperlipemia     Hypertension     CHALRES (obstructive sleep apnea)     Spinal cord disease     Trouble in sleeping     Type 2 diabetes mellitus with diabetic polyneuropathy, without long-term current use of insulin     Uses hearing aid     bilat     Past Surgical History:   Procedure Laterality Date    ARTHROPLASTY-HIP (ANTERIOR APPROACH) Left 10/9/2017    Performed by Albaro Alcocer Sr[facility]    BACK SURGERY      BURSECTOMY - ELBOW Left 10/27/2017    Performed by Albaro Alcocer Sr., MD at [facility]    Colonoscopy N/A 4/7/2015    Performed by Vahid Romero MD at [facility] ENDO    HERNIA REPAIR Bilateral 04/18/2017    INJECTION-JOINT Left 5/18/2016    Performed by Errol Madera [facility] at [facility] PAIN [facility]GT    JOINT REPLACEMENT Left 10/09/2017    L YAHIR Dr. Alcocer    LAMINECTOMY  07/22/2016    LAMINECTOMY-LUMBAR-DECOMPRESSION - L3/4 Laminectomy and Cyst Removal L4/5 Laminectomy No Implants SNS: SSEP/EMG Open carlos + Aaron C-arm 2 hours 1st case N/A 7/22/2016    Performed by Sin Bacon MD at [facility] OR 2ND FLR    left elbow  10/2017    procedure to remove gout    lumbar foraminotomy  03/2018    REPAIR-HERNIA-INGUINAL-BILATERAL-LAPAROSCOPIC w/ mesh Bilateral 4/18/2017    Performed by Chauncey Ellis Jr., MD at [facility] OR 2ND FLR    REVISION TOTAL HIP ARTHROPLASTY Left 9/11/2018    Procedure: REVISION, TOTAL ARTHROPLASTY, HIP;  Surgeon: Albaro Alcocer Sr., MD;  Location: [facility] OR;  Service: Orthopedics;  Laterality: Left;    REVISION, TOTAL  ARTHROPLASTY, HIP Left 9/11/2018    Performed by [facility]bar[facility] Sr., MD at [facility] OR    ROTATOR CUFF REPAIR Left 2006    SHOULDER ARTHROSCOPY W/ ROTATOR CUFF REPAIR Right 2009    Transforaminal Epidural Steroid Injection-Left L3-4 & INJECTION-FACET-Left L3-4 Facet Joint Injection & Cyst Aspiration Left 6/14/2016    Performed by Desmond Hutchinson MD at [facility] PAIN MGT     Review of patient's allergies indicates:   Allergen Reactions    Sulfa (sulfonamide antibiotics)      Can't recall from 1995     Current Outpatient Medications on File Prior to Visit   Medication Sig Dispense Refill    allopurinol (ZYLOPRIM) 100 MG tablet Take 1 tablet (100 mg total) by mouth once daily. 90 tablet 4    apixaban (ELIQUIS) 2.5 mg Tab Take 1 tablet (2.5 mg total) by mouth 2 (two) times daily. 80 tablet 0    aspirin (ECOTRIN) 81 MG EC tablet Take 1 tablet (81 mg total) by mouth once daily.  0    blood sugar diagnostic (ACCU-CHEK FRANKO PLUS TEST STRP) Strp USE  1  STRIP ONE TIME DAILY 100 strip 4    blood-glucose meter (ACCU-CHEK FRANKO PLUS METER) Misc 1 Device by Misc.(Non-Drug; Combo Route) route once daily. 1 each 0    celecoxib (CELEBREX) 200 MG capsule TAKE 1 CAPSULE(200 MG) BY MOUTH EVERY DAY 90 capsule 0    colchicine 0.6 mg tablet Take 2 tablets once, then 1 tablet in 1 hour. (Patient taking differently: Take 0.6 mg by mouth. Take 2 tablets once, then 1 tablet in 1 hour.) 6 tablet 0    docusate sodium (COLACE) 100 MG capsule Take 1 capsule (100 mg total) by mouth 2 (two) times daily as needed. 60 capsule 0    ferrous sulfate 324 mg (65 mg iron) TbEC Take 325 mg by mouth once daily.      fluticasone (FLONASE) 50 mcg/actuation nasal spray 2 sprays (100 mcg total) by Each Nare route once daily. 1 Bottle 0    gabapentin (NEURONTIN) 300 MG capsule Take 300 mg by mouth 2 (two) times daily.      GARLIC EXTRACT ORAL Take by mouth once daily.      glipiZIDE (GLUCOTROL) 5 MG tablet TAKE 2 TABLETS (10 MG) TWO TIMES DAILY  "BEFORE MEALS 360 tablet 4    lancets (ACCU-CHEK SOFTCLIX LANCETS) Misc 1 lancet by Misc.(Non-Drug; Combo Route) route once daily. 100 each 2    losartan-hydrochlorothiazide 50-12.5 mg (HYZAAR) 50-12.5 mg per tablet TAKE 1 TABLET EVERY DAY 90 tablet 4    metFORMIN (GLUCOPHAGE) 1000 MG tablet TAKE 1 TABLET TWICE DAILY WITH MEALS 180 tablet 4    metoprolol succinate (TOPROL-XL) 50 MG 24 hr tablet TAKE 1 TABLET EVERY DAY 90 tablet 3    multivitamin (ONE DAILY MULTIVITAMIN) per tablet Take 1 tablet by mouth once daily.      nozaseptin (NOZIN) nasal  1 each by Each Nare route 2 (two) times daily. Complete supply provided by hospital 1 applicator 0    oxyCODONE-acetaminophen (PERCOCET)  mg per tablet Take 1 tablet by mouth every 4 (four) hours as needed for Pain. 12 tablet 0    pravastatin (PRAVACHOL) 40 MG tablet TAKE 1 TABLET EVERY DAY (Patient taking differently: TAKE 1 TABLET EVERY NIGHT) 90 tablet 4    tamsulosin (FLOMAX) 0.4 mg Cp24 Take 1 capsule (0.4 mg total) by mouth once daily. 30 capsule 11     No current facility-administered medications on file prior to visit.      /62   Pulse 77   Ht 6' 3" (1.905 m)   Wt 94.3 kg (208 lb)   BMI 26.00 kg/m²    ROS:  GENERAL: No fever, chills, fatigability or weight loss.  MUSCULOSKELETAL: See HPI    PE:  APPEARANCE: Well nourished, well developed, in no acute distress.   MUSCULOSKELETAL:        Left Hip  -dressing intact, 2 plus dorsalis pedis and posterior tibial artery pulses, light touch intact Left lower extremity.  All digits are warm. No erythema, no warmth, no drainage, no swelling, no significant tenderness.  Less than 2 seconds capillary refill all digits.      ASSESSMENT:  Rifampin and minocycline  The patient is stable and improving.  X-Ray Hip 2 View Left  Narrative: EXAMINATION:  XR HIP 2 VIEW LEFT    CLINICAL HISTORY:  Pain in left hip    TECHNIQUE:  AP view of the pelvis and frog leg lateral view of the left hip were " performed.    COMPARISON:  Prior radiographs from 09/11/2018 and 08/14/2018    FINDINGS:  Left hip arthroplasty is again seen and appears unchanged when compared to the recent radiographs from 2 weeks prior.  No periprosthetic fracture.  Heterotopic ossification projects about the lateral left hip.  There is interval decrease in subcutaneous emphysema.  Overlying skin staples are seen.  There are degenerative changes of the right hip.  Impression: As above    Electronically signed by: Skip Daily MD  Date:    09/25/2018  Time:    13:57        PLAN:  Sutures and staples removed  Minocycline and rifampin  Continue pain medication  Continue wound care  Continue physical therapy  Follow-up in 4 weeks  CRP, sedimentation rate and CBC with diff

## 2018-09-26 PROBLEM — Z98.890 POSTOPERATIVE STATE: Status: ACTIVE | Noted: 2018-09-26

## 2018-09-26 NOTE — PATIENT INSTRUCTIONS

## 2018-10-09 ENCOUNTER — PATIENT OUTREACH (OUTPATIENT)
Dept: ADMINISTRATIVE | Facility: CLINIC | Age: 78
End: 2018-10-09

## 2018-10-09 LAB
FUNGUS SPEC CULT: NORMAL
FUNGUS SPEC CULT: NORMAL

## 2018-10-23 ENCOUNTER — TELEPHONE (OUTPATIENT)
Dept: INTERNAL MEDICINE | Facility: CLINIC | Age: 78
End: 2018-10-23

## 2018-10-23 ENCOUNTER — OFFICE VISIT (OUTPATIENT)
Dept: ORTHOPEDICS | Facility: CLINIC | Age: 78
End: 2018-10-23
Payer: MEDICARE

## 2018-10-23 ENCOUNTER — HOSPITAL ENCOUNTER (OUTPATIENT)
Dept: RADIOLOGY | Facility: HOSPITAL | Age: 78
Discharge: HOME | End: 2018-10-23
Attending: ORTHOPAEDIC SURGERY
Payer: MEDICARE

## 2018-10-23 VITALS
SYSTOLIC BLOOD PRESSURE: 112 MMHG | TEMPERATURE: 98 F | HEIGHT: 75 IN | HEART RATE: 88 BPM | WEIGHT: 208 LBS | RESPIRATION RATE: 18 BRPM | BODY MASS INDEX: 25.86 KG/M2 | DIASTOLIC BLOOD PRESSURE: 59 MMHG

## 2018-10-23 DIAGNOSIS — M25.552 PAIN OF LEFT HIP JOINT: ICD-10-CM

## 2018-10-23 DIAGNOSIS — Z98.890 POSTOPERATIVE STATE: Primary | ICD-10-CM

## 2018-10-23 PROCEDURE — 99213 OFFICE O/P EST LOW 20 MIN: CPT | Mod: PBBFAC,25,PO | Performed by: ORTHOPAEDIC SURGERY

## 2018-10-23 PROCEDURE — 73502 X-RAY EXAM HIP UNI 2-3 VIEWS: CPT | Mod: 26,LT,, | Performed by: RADIOLOGY

## 2018-10-23 PROCEDURE — 99999 PR PBB SHADOW E&M-EST. PATIENT-LVL III: CPT | Mod: PBBFAC,,, | Performed by: ORTHOPAEDIC SURGERY

## 2018-10-23 PROCEDURE — 99024 POSTOP FOLLOW-UP VISIT: CPT | Mod: ,,, | Performed by: ORTHOPAEDIC SURGERY

## 2018-10-23 PROCEDURE — 73502 X-RAY EXAM HIP UNI 2-3 VIEWS: CPT | Mod: TC,FY,PO,LT

## 2018-10-23 RX ORDER — FERROUS SULFATE 325(65) MG
TABLET ORAL
Refills: 0 | COMMUNITY
Start: 2018-10-03 | End: 2018-11-07

## 2018-10-23 NOTE — TELEPHONE ENCOUNTER
----- Message from Lyla Beal sent at 10/23/2018  4:36 PM CDT -----  Patient returning call..[phone] (home)

## 2018-10-24 NOTE — PATIENT INSTRUCTIONS

## 2018-10-24 NOTE — PROGRESS NOTES
SUBJECTIVE:  Patient is status post Left total hip revision.  Patient states that he is much improved.  Patient complains of  2/ 10 pain that is better with the  pain meds and aggravated with movement.    Past Medical History:   Diagnosis Date    Anemia due to unknown mechanism 11/10/2015    Angina pectoris 2014    not since    Arthritis     Back pain     Coronary artery disease     Encounter for blood transfusion     Gout 12/05/2017    right foot     Hyperlipemia     Hypertension     CHARLES (obstructive sleep apnea)     Spinal cord disease     Trouble in sleeping     Type 2 diabetes mellitus with diabetic polyneuropathy, without long-term current use of insulin     Uses hearing aid     bilat     Past Surgical History:   Procedure Laterality Date    ARTHROPLASTY-HIP (ANTERIOR APPROACH) Left 10/9/2017    Performed by Albaro Alcocer Sr., MD at [facility] OR    BACK SURGERY      BURSECTOMY - ELBOW Left 10/27/2017    Performed by Albaro Alcocer Sr., MD at [facility] OR    Colonoscopy N/A 4/7/2015    Performed by Vahid Romero MD at [facility] ENDO    HERNIA REPAIR Bilateral 04/18/2017    INJECTION-JOINT Left 5/18/2016    Performed by Errol Madera MD at [facility] PAIN MGT    JOINT REPLACEMENT Left 10/09/2017    L YAHIR Dr. Alcocer    LAMINECTOMY  07/22/2016    LAMINECTOMY-LUMBAR-DECOMPRESSION - L3/4 Laminectomy and Cyst Removal L4/5 Laminectomy No Implants SNS: SSEP/EMG Open carlos + Aaron C-arm 2 hours 1st case N/A 7/22/2016    Performed by Sin Bacon MD at [facility] OR 2ND FLR    left elbow  10/2017    procedure to remove gout    lumbar foraminotomy  03/2018    REPAIR-HERNIA-INGUINAL-BILATERAL-LAPAROSCOPIC w/ mesh Bilateral 4/18/2017    Performed by Chauncey Ellis Jr., MD at [facility] OR 2ND FLR    REVISION TOTAL HIP ARTHROPLASTY Left 9/11/2018    Procedure: REVISION, TOTAL ARTHROPLASTY, HIP;  Surgeon: Albaro Alcocer Sr., MD;  Location: [facility] OR;  Service: Orthopedics;  Laterality: Left;    REVISION, TOTAL  ARTHROPLASTY, HIP Left 9/11/2018    Performed by [facility] Sr[facility] at [facility] OR    ROTATOR CUFF REPAIR Left 2006    SHOULDER ARTHROSCOPY W/ ROTATOR CUFF REPAIR Right 2009    Transforaminal Epidural Steroid Injection-Left L3-4 & INJECTION-FACET-Left L3-4 Facet Joint Injection & Cyst Aspiration Left 6/14/2016    Performed by Desmond Hutchinson MD at [facility] PAIN MGT     Review of patient's allergies indicates:   Allergen Reactions    Sulfa (sulfonamide antibiotics)      Can't recall from 1995     Current Outpatient Medications on File Prior to Visit   Medication Sig Dispense Refill    allopurinol (ZYLOPRIM) 100 MG tablet Take 1 tablet (100 mg total) by mouth once daily. 90 tablet 4    aspirin (ECOTRIN) 81 MG EC tablet Take 1 tablet (81 mg total) by mouth once daily.  0    blood sugar diagnostic (ACCU-CHEK FRANKO PLUS TEST STRP) Strp USE  1  STRIP ONE TIME DAILY 100 strip 4    blood-glucose meter (ACCU-CHEK FRANKO PLUS METER) Misc 1 Device by Misc.(Non-Drug; Combo Route) route once daily. 1 each 0    celecoxib (CELEBREX) 200 MG capsule TAKE 1 CAPSULE(200 MG) BY MOUTH EVERY DAY 90 capsule 0    clonazepam (KLONOPIN ORAL) Take 0.25 mg by mouth every evening. Per [facility]      docusate sodium (COLACE) 100 MG capsule Take 1 capsule (100 mg total) by mouth 2 (two) times daily as needed. (Patient taking differently: Take 100 mg by mouth 2 (two) times daily. Per [facility]) 60 capsule 0    ferrous sulfate (FEOSOL) 325 mg (65 mg iron) Tab tablet TK 1 T PO ONCE D  0    ferrous sulfate 324 mg (65 mg iron) TbEC Take 325 mg by mouth once daily.      fluticasone (FLONASE) 50 mcg/actuation nasal spray 2 sprays (100 mcg total) by Each Nare route once daily. 1 Bottle 0    gabapentin (NEURONTIN) 300 MG capsule Take 300 mg by mouth 2 (two) times daily.      GARLIC EXTRACT ORAL Take 1 tablet by mouth once daily. Per [facility]      glipiZIDE (GLUCOTROL) 5 MG tablet TAKE 2 TABLETS (10 MG) TWO TIMES  "DAILY BEFORE MEALS 360 tablet 4    lancets (ACCU-CHEK SOFTCLIX LANCETS) Misc 1 lancet by Misc.(Non-Drug; Combo Route) route once daily. 100 each 2    losartan-hydrochlorothiazide 50-12.5 mg (HYZAAR) 50-12.5 mg per tablet TAKE 1 TABLET EVERY DAY 90 tablet 4    metFORMIN (GLUCOPHAGE) 1000 MG tablet TAKE 1 TABLET TWICE DAILY WITH MEALS 180 tablet 4    metoprolol succinate (TOPROL-XL) 50 MG 24 hr tablet TAKE 1 TABLET EVERY DAY 90 tablet 3    multivitamin (ONE DAILY MULTIVITAMIN) per tablet Take 1 tablet by mouth once daily.      nozaseptin (NOZIN) nasal  1 each by Each Nare route 2 (two) times daily. Complete supply provided by hospital 1 applicator 0    oxyCODONE-acetaminophen (PERCOCET)  mg per tablet Take 1 tablet by mouth every 4 (four) hours as needed for Pain. 12 tablet 0    pravastatin (PRAVACHOL) 40 MG tablet TAKE 1 TABLET EVERY DAY (Patient taking differently: TAKE 1 TABLET EVERY NIGHT) 90 tablet 4    tamsulosin (FLOMAX) 0.4 mg Cp24 Take 1 capsule (0.4 mg total) by mouth once daily. 30 capsule 11    UNABLE TO FIND Insulin Lispro 100unit/mL subQ QD per sliding scale; Per [facility];  If bg is 0-200, no coverage  If bg is 201-250, give 2 units  If bg is 251-300, give 4 units  If bg is 301-350, give 6 units  If bg is 351-400, give 8 units  If bg is > 400, give 10 units and call MD       No current facility-administered medications on file prior to visit.      BP (!) 112/59   Pulse 88   Temp 98.2 °F (36.8 °C) (Oral)   Resp 18   Ht 6' 3" (1.905 m)   Wt 94.3 kg (208 lb)   BMI 26.00 kg/m²    ROS:  GENERAL: No fever, chills, fatigability or weight loss.  MUSCULOSKELETAL: See HPI    PE:  APPEARANCE: Well nourished, well developed, in no acute distress.   MUSCULOSKELETAL:        Left Hip  -dressing intact, 2 plus dorsalis pedis and posterior tibial artery pulses, light touch intact Left lower extremity.  All digits are warm. No erythema, no warmth, no drainage, no swelling, no " significant tenderness.  Less than 2 seconds capillary refill all digits.      ASSESSMENT:  Rifampin and minocycline  The patient is stable and improving.  X-Ray Hip 2 View Left  Narrative: EXAMINATION:  XR HIP 2 VIEW LEFT    CLINICAL HISTORY:  Pain in left hip    TECHNIQUE:  AP view of the pelvis and frog leg lateral view of the left hip were performed.    COMPARISON:  09/25/2018    FINDINGS:  Left hip arthroplasty changes are stable.  No acute abnormality.  Advanced degenerative changes of the right hip remain.  Moderate to advanced degenerative changes of the lumbar spine noted.  Impression: No significant change    Electronically signed by: Hong Gutierrez MD  Date:    10/23/2018  Time:    14:52        PLAN:    Minocycline and rifampin as per Infectious Disease  Continue pain medication  Continue physical therapy  Follow-up in 12 weeks  CRP, sedimentation rate and CBC with diff

## 2018-10-26 ENCOUNTER — TELEPHONE (OUTPATIENT)
Dept: ORTHOPEDICS | Facility: CLINIC | Age: 78
End: 2018-10-26

## 2018-10-26 NOTE — TELEPHONE ENCOUNTER
Spoke with the patient about his appointment that he has with Dr. Alcocer on 12/6/18. I informed the patient that Dr. Alcocer will be moving to the [place] location and that he will see Dr. Alcocer the same day and time just at the [place] location. Patient verbalized understanding.FP

## 2018-10-30 ENCOUNTER — LAB VISIT (OUTPATIENT)
Dept: LAB | Facility: HOSPITAL | Age: 78
End: 2018-10-30
Attending: INTERNAL MEDICINE
Payer: MEDICARE

## 2018-10-30 DIAGNOSIS — E11.42 TYPE 2 DIABETES MELLITUS WITH DIABETIC POLYNEUROPATHY, WITHOUT LONG-TERM CURRENT USE OF INSULIN: ICD-10-CM

## 2018-10-30 LAB
ALBUMIN SERPL BCP-MCNC: 3.4 G/DL
ALP SERPL-CCNC: 82 U/L
ALT SERPL W/O P-5'-P-CCNC: 11 U/L
ANION GAP SERPL CALC-SCNC: 7 MMOL/L
AST SERPL-CCNC: 17 U/L
BILIRUB SERPL-MCNC: 0.3 MG/DL
BUN SERPL-MCNC: 16 MG/DL
CALCIUM SERPL-MCNC: 9.1 MG/DL
CHLORIDE SERPL-SCNC: 106 MMOL/L
CHOLEST SERPL-MCNC: 142 MG/DL
CHOLEST/HDLC SERPL: 4.7 {RATIO}
CO2 SERPL-SCNC: 30 MMOL/L
CREAT SERPL-MCNC: 1.3 MG/DL
EST. GFR  (AFRICAN AMERICAN): >60 ML/MIN/1.73 M^2
EST. GFR  (NON AFRICAN AMERICAN): 53.4 ML/MIN/1.73 M^2
ESTIMATED AVG GLUCOSE: 114 MG/DL
GLUCOSE SERPL-MCNC: 105 MG/DL
HBA1C MFR BLD HPLC: 5.6 %
HDLC SERPL-MCNC: 30 MG/DL
HDLC SERPL: 21.1 %
LDLC SERPL CALC-MCNC: 91.4 MG/DL
NONHDLC SERPL-MCNC: 112 MG/DL
POTASSIUM SERPL-SCNC: 3.8 MMOL/L
PROT SERPL-MCNC: 6.5 G/DL
SODIUM SERPL-SCNC: 143 MMOL/L
TRIGL SERPL-MCNC: 103 MG/DL

## 2018-10-30 PROCEDURE — 36415 COLL VENOUS BLD VENIPUNCTURE: CPT | Mod: PO

## 2018-10-30 PROCEDURE — 80053 COMPREHEN METABOLIC PANEL: CPT

## 2018-10-30 PROCEDURE — 80061 LIPID PANEL: CPT

## 2018-10-30 PROCEDURE — 83036 HEMOGLOBIN GLYCOSYLATED A1C: CPT

## 2018-10-31 ENCOUNTER — PATIENT MESSAGE (OUTPATIENT)
Dept: INTERNAL MEDICINE | Facility: CLINIC | Age: 78
End: 2018-10-31

## 2018-11-07 ENCOUNTER — TELEPHONE (OUTPATIENT)
Dept: INTERNAL MEDICINE | Facility: CLINIC | Age: 78
End: 2018-11-07

## 2018-11-07 ENCOUNTER — OFFICE VISIT (OUTPATIENT)
Dept: INTERNAL MEDICINE | Facility: CLINIC | Age: 78
End: 2018-11-07
Payer: MEDICARE

## 2018-11-07 ENCOUNTER — LAB VISIT (OUTPATIENT)
Dept: LAB | Facility: HOSPITAL | Age: 78
End: 2018-11-07
Attending: PHYSICIAN ASSISTANT
Payer: MEDICARE

## 2018-11-07 VITALS
DIASTOLIC BLOOD PRESSURE: 68 MMHG | TEMPERATURE: 97 F | HEIGHT: 75 IN | BODY MASS INDEX: 26.94 KG/M2 | HEART RATE: 72 BPM | SYSTOLIC BLOOD PRESSURE: 124 MMHG | WEIGHT: 216.69 LBS

## 2018-11-07 DIAGNOSIS — D64.9 ANEMIA, UNSPECIFIED TYPE: ICD-10-CM

## 2018-11-07 DIAGNOSIS — I10 ESSENTIAL HYPERTENSION: Chronic | ICD-10-CM

## 2018-11-07 DIAGNOSIS — D64.9 ANEMIA, UNSPECIFIED TYPE: Primary | ICD-10-CM

## 2018-11-07 DIAGNOSIS — Z96.642 STATUS POST TOTAL REPLACEMENT OF LEFT HIP: ICD-10-CM

## 2018-11-07 LAB
BASOPHILS # BLD AUTO: 0.06 K/UL
BASOPHILS NFR BLD: 1.2 %
DIFFERENTIAL METHOD: ABNORMAL
EOSINOPHIL # BLD AUTO: 0.3 K/UL
EOSINOPHIL NFR BLD: 6.4 %
ERYTHROCYTE [DISTWIDTH] IN BLOOD BY AUTOMATED COUNT: 14.8 %
HCT VFR BLD AUTO: 36.2 %
HGB BLD-MCNC: 11.4 G/DL
IMM GRANULOCYTES # BLD AUTO: 0.02 K/UL
IMM GRANULOCYTES NFR BLD AUTO: 0.4 %
LYMPHOCYTES # BLD AUTO: 1.7 K/UL
LYMPHOCYTES NFR BLD: 33.5 %
MCH RBC QN AUTO: 27.3 PG
MCHC RBC AUTO-ENTMCNC: 31.5 G/DL
MCV RBC AUTO: 87 FL
MONOCYTES # BLD AUTO: 0.5 K/UL
MONOCYTES NFR BLD: 9 %
NEUTROPHILS # BLD AUTO: 2.5 K/UL
NEUTROPHILS NFR BLD: 49.5 %
NRBC BLD-RTO: 0 /100 WBC
PLATELET # BLD AUTO: 266 K/UL
PMV BLD AUTO: 11 FL
RBC # BLD AUTO: 4.17 M/UL
WBC # BLD AUTO: 5.01 K/UL

## 2018-11-07 PROCEDURE — 3074F SYST BP LT 130 MM HG: CPT | Mod: CPTII,HCNC,S$GLB, | Performed by: PHYSICIAN ASSISTANT

## 2018-11-07 PROCEDURE — 99214 OFFICE O/P EST MOD 30 MIN: CPT | Mod: HCNC,S$GLB,, | Performed by: PHYSICIAN ASSISTANT

## 2018-11-07 PROCEDURE — 36415 COLL VENOUS BLD VENIPUNCTURE: CPT | Mod: PO

## 2018-11-07 PROCEDURE — 1101F PT FALLS ASSESS-DOCD LE1/YR: CPT | Mod: CPTII,HCNC,S$GLB, | Performed by: PHYSICIAN ASSISTANT

## 2018-11-07 PROCEDURE — 99999 PR PBB SHADOW E&M-EST. PATIENT-LVL IV: CPT | Mod: PBBFAC,HCNC,, | Performed by: PHYSICIAN ASSISTANT

## 2018-11-07 PROCEDURE — 85025 COMPLETE CBC W/AUTO DIFF WBC: CPT

## 2018-11-07 PROCEDURE — 3078F DIAST BP <80 MM HG: CPT | Mod: CPTII,HCNC,S$GLB, | Performed by: PHYSICIAN ASSISTANT

## 2018-11-07 RX ORDER — TADALAFIL 5 MG/1
5 TABLET ORAL DAILY PRN
Qty: 30 TABLET | Refills: 0 | Status: SHIPPED | OUTPATIENT
Start: 2018-11-07 | End: 2021-09-15

## 2018-11-07 RX ORDER — FERROUS SULFATE 324(65)MG
325 TABLET, DELAYED RELEASE (ENTERIC COATED) ORAL DAILY
COMMUNITY
Start: 2018-11-07 | End: 2022-12-29

## 2018-11-07 NOTE — PROGRESS NOTES
Subjective:       Patient ID: Srinath Mcknight is a 77 y.o. male.    Chief Complaint: Follow-up    HPI     Patient comes in today for follow up   Did have hip surgery recently.   20 days in rehab center, now has HH   Next week going to outpatient [facility] therapy     doing well, reports better ambulation   No fc/ns     Patient has also had some recent anemia that we need to check out.  It seems to be chronic and he was followed by Heme-Onc and he needs refill on iron pills.  Since he recently had a surgery we will go ahead and recheck blood count today.  Patient was also seeing PCP for frequent urinary frequency and he is no longer having that issue and not taking the Jie listen.    Health Maintenance Due   Topic Date Due    PROSTATE-SPECIFIC ANTIGEN  03/07/2018    Foot Exam  12/06/2018       Past Medical History:   Diagnosis Date    Anemia due to unknown mechanism 11/10/2015    Angina pectoris 2014    not since    Arthritis     Back pain     Coronary artery disease     Encounter for blood transfusion     Gout 12/05/2017    right foot     Hyperlipemia     Hypertension     CHARLES (obstructive sleep apnea)     Spinal cord disease     Trouble in sleeping     Type 2 diabetes mellitus with diabetic polyneuropathy, without long-term current use of insulin     Uses hearing aid     bilat       Current Outpatient Medications   Medication Sig Dispense Refill    allopurinol (ZYLOPRIM) 100 MG tablet Take 1 tablet (100 mg total) by mouth once daily. 90 tablet 4    aspirin (ECOTRIN) 81 MG EC tablet Take 1 tablet (81 mg total) by mouth once daily.  0    blood sugar diagnostic (ACCU-CHEK FRANKO PLUS TEST STRP) Strp USE  1  STRIP ONE TIME DAILY 100 strip 4    blood-glucose meter (ACCU-CHEK FRANKO PLUS METER) Misc 1 Device by Misc.(Non-Drug; Combo Route) route once daily. 1 each 0    celecoxib (CELEBREX) 200 MG capsule TAKE 1 CAPSULE(200 MG) BY MOUTH EVERY DAY 90 capsule 0    clonazepam (KLONOPIN ORAL) Take 0.25 mg by  mouth every evening. Per [facility]      docusate sodium (COLACE) 100 MG capsule Take 1 capsule (100 mg total) by mouth 2 (two) times daily as needed. (Patient taking differently: Take 100 mg by mouth 2 (two) times daily. Per [facility]) 60 capsule 0    ferrous sulfate 324 mg (65 mg iron) TbEC Take 1 tablet (324 mg total) by mouth once daily.      gabapentin (NEURONTIN) 300 MG capsule Take 300 mg by mouth 2 (two) times daily.      GARLIC EXTRACT ORAL Take 1 tablet by mouth once daily. Per [facility]      glipiZIDE (GLUCOTROL) 5 MG tablet TAKE 2 TABLETS (10 MG) TWO TIMES DAILY BEFORE MEALS 360 tablet 4    lancets (ACCU-CHEK SOFTCLIX LANCETS) Misc 1 lancet by Misc.(Non-Drug; Combo Route) route once daily. 100 each 2    losartan-hydrochlorothiazide 50-12.5 mg (HYZAAR) 50-12.5 mg per tablet TAKE 1 TABLET EVERY DAY 90 tablet 4    metFORMIN (GLUCOPHAGE) 1000 MG tablet TAKE 1 TABLET TWICE DAILY WITH MEALS 180 tablet 4    metoprolol succinate (TOPROL-XL) 50 MG 24 hr tablet TAKE 1 TABLET EVERY DAY 90 tablet 3    multivitamin (ONE DAILY MULTIVITAMIN) per tablet Take 1 tablet by mouth once daily.      nozaseptin (NOZIN) nasal  1 each by Each Nare route 2 (two) times daily. Complete supply provided by [facility] 1 applicator 0    pravastatin (PRAVACHOL) 40 MG tablet TAKE 1 TABLET EVERY DAY (Patient taking differently: TAKE 1 TABLET EVERY NIGHT) 90 tablet 4    tamsulosin (FLOMAX) 0.4 mg Cp24 Take 1 capsule (0.4 mg total) by mouth once daily. 30 capsule 11    tadalafil (CIALIS) 5 MG tablet Take 1 tablet (5 mg total) by mouth daily as needed for Erectile Dysfunction. 30 tablet 0     No current facility-administered medications for this visit.        Review of Systems   Constitutional: Negative for fever and unexpected weight change.   Respiratory: Negative for cough, shortness of breath and wheezing.    Cardiovascular: Negative for chest pain and palpitations.   Gastrointestinal: Negative  "for constipation, diarrhea, nausea and vomiting.   Genitourinary: Negative for dysuria and hematuria.   Musculoskeletal: Positive for arthralgias.       Objective:   /68   Pulse 72   Temp 97.2 °F (36.2 °C)   Ht 6' 3" (1.905 m)   Wt 98.3 kg (216 lb 11.4 oz)   BMI 27.09 kg/m²      Physical Exam   Constitutional: He is oriented to person, place, and time. He appears well-developed and well-nourished. No distress.   HENT:   Head: Normocephalic and atraumatic.   Eyes: Conjunctivae and EOM are normal. Pupils are equal, round, and reactive to light. No scleral icterus.   Cardiovascular: Normal rate and regular rhythm. Exam reveals no gallop and no friction rub.   No murmur heard.  Pulmonary/Chest: Effort normal and breath sounds normal.   Neurological: He is alert and oriented to person, place, and time.   Skin: Skin is warm and dry.   Psychiatric: He has a normal mood and affect.   Vitals reviewed.        Lab Results   Component Value Date    WBC 5.01 11/07/2018    HGB 11.4 (L) 11/07/2018    HCT 36.2 (L) 11/07/2018     11/07/2018    CHOL 142 10/30/2018    TRIG 103 10/30/2018    HDL 30 (L) 10/30/2018    ALT 11 10/30/2018    AST 17 10/30/2018     10/30/2018    K 3.8 10/30/2018     10/30/2018    CREATININE 1.3 10/30/2018    BUN 16 10/30/2018    CO2 30 (H) 10/30/2018    TSH 1.362 12/23/2011    PSA 0.99 02/23/2016    INR 0.9 02/08/2018    GLUF 106 08/22/2011    HGBA1C 5.6 10/30/2018       Assessment:       1. Anemia, unspecified type    2. Status post total replacement of left hip    3. Essential hypertension        Plan:   Anemia, unspecified type  -     CBC auto differential; Future; Expected date: 11/07/2018  Refill on iron  Status post total replacement of left hip  Patient doing well with recent inpatient and now current outpatient therapy  Essential hypertension  Blood pressure controlled, continue medications.  Other orders  -     ferrous sulfate 324 mg (65 mg iron) TbEC; Take 1 tablet (324 " mg total) by mouth once daily.        Follow-up in about 4 months (around 3/7/2019).

## 2018-11-09 ENCOUNTER — TELEPHONE (OUTPATIENT)
Dept: ORTHOPEDICS | Facility: CLINIC | Age: 78
End: 2018-11-09

## 2018-11-09 NOTE — TELEPHONE ENCOUNTER
----- Message from Felicity Henderson sent at 11/9/2018  2:38 PM CST -----  Contact: self/[phone]  Would like to consult with nurse regarding physical therapy at [facility] and need a appt reschedule. Please call back at [phone]. Reynaldo/ar

## 2018-11-13 ENCOUNTER — LAB VISIT (OUTPATIENT)
Dept: LAB | Facility: HOSPITAL | Age: 78
End: 2018-11-13
Attending: NURSE PRACTITIONER
Payer: MEDICARE

## 2018-11-13 DIAGNOSIS — D64.9 ANEMIA DUE TO UNKNOWN MECHANISM: ICD-10-CM

## 2018-11-13 DIAGNOSIS — D64.9 ANEMIA DUE TO UNKNOWN MECHANISM: Primary | ICD-10-CM

## 2018-11-13 LAB
ACID FAST MOD KINY STN SPEC: NORMAL
ALBUMIN SERPL BCP-MCNC: 3.7 G/DL
ALP SERPL-CCNC: 82 U/L
ALT SERPL W/O P-5'-P-CCNC: 13 U/L
ANION GAP SERPL CALC-SCNC: 10 MMOL/L
AST SERPL-CCNC: 20 U/L
BASOPHILS # BLD AUTO: 0.04 K/UL
BASOPHILS NFR BLD: 0.8 %
BILIRUB SERPL-MCNC: 0.4 MG/DL
BUN SERPL-MCNC: 17 MG/DL
CALCIUM SERPL-MCNC: 9.4 MG/DL
CHLORIDE SERPL-SCNC: 109 MMOL/L
CO2 SERPL-SCNC: 26 MMOL/L
CREAT SERPL-MCNC: 1.3 MG/DL
CRP SERPL-MCNC: 1 MG/L
DIFFERENTIAL METHOD: ABNORMAL
EOSINOPHIL # BLD AUTO: 0.3 K/UL
EOSINOPHIL NFR BLD: 5.6 %
ERYTHROCYTE [DISTWIDTH] IN BLOOD BY AUTOMATED COUNT: 14.6 %
EST. GFR  (AFRICAN AMERICAN): >60 ML/MIN/1.73 M^2
EST. GFR  (NON AFRICAN AMERICAN): 53 ML/MIN/1.73 M^2
FERRITIN SERPL-MCNC: 28 NG/ML
GLUCOSE SERPL-MCNC: 85 MG/DL
HCT VFR BLD AUTO: 34.9 %
HGB BLD-MCNC: 11.7 G/DL
IRON SERPL-MCNC: 100 UG/DL
LYMPHOCYTES # BLD AUTO: 1.7 K/UL
LYMPHOCYTES NFR BLD: 34.5 %
MCH RBC QN AUTO: 28.3 PG
MCHC RBC AUTO-ENTMCNC: 33.5 G/DL
MCV RBC AUTO: 84 FL
MONOCYTES # BLD AUTO: 0.4 K/UL
MONOCYTES NFR BLD: 8.6 %
MYCOBACTERIUM SPEC QL CULT: NORMAL
NEUTROPHILS # BLD AUTO: 2.5 K/UL
NEUTROPHILS NFR BLD: 50.5 %
PLATELET # BLD AUTO: 188 K/UL
PMV BLD AUTO: 10 FL
POTASSIUM SERPL-SCNC: 4.3 MMOL/L
PROT SERPL-MCNC: 6.7 G/DL
RBC # BLD AUTO: 4.14 M/UL
SATURATED IRON: 30 %
SODIUM SERPL-SCNC: 145 MMOL/L
TOTAL IRON BINDING CAPACITY: 329 UG/DL
TRANSFERRIN SERPL-MCNC: 222 MG/DL
WBC # BLD AUTO: 4.99 K/UL

## 2018-11-13 PROCEDURE — 80053 COMPREHEN METABOLIC PANEL: CPT | Mod: HCNC,PO

## 2018-11-13 PROCEDURE — 83540 ASSAY OF IRON: CPT | Mod: HCNC

## 2018-11-13 PROCEDURE — 86140 C-REACTIVE PROTEIN: CPT | Mod: HCNC

## 2018-11-13 PROCEDURE — 36415 COLL VENOUS BLD VENIPUNCTURE: CPT | Mod: HCNC,PO

## 2018-11-13 PROCEDURE — 85025 COMPLETE CBC W/AUTO DIFF WBC: CPT | Mod: HCNC,PO

## 2018-11-13 PROCEDURE — 82728 ASSAY OF FERRITIN: CPT | Mod: HCNC

## 2018-11-14 ENCOUNTER — TELEPHONE (OUTPATIENT)
Dept: ORTHOPEDICS | Facility: CLINIC | Age: 78
End: 2018-11-14

## 2018-11-14 LAB
ACID FAST MOD KINY STN SPEC: NORMAL
MYCOBACTERIUM SPEC QL CULT: NORMAL

## 2018-11-14 NOTE — TELEPHONE ENCOUNTER
Left a message for the patient to give the office a call back.FP    ----- Message from Felicita Cleary sent at 11/14/2018  3:41 PM CST -----  Contact: pt  Pt request call back regarding referral for physical therapy    ..[phone] (home)

## 2018-11-15 ENCOUNTER — TELEPHONE (OUTPATIENT)
Dept: UROLOGY | Facility: CLINIC | Age: 78
End: 2018-11-15

## 2018-11-15 ENCOUNTER — TELEPHONE (OUTPATIENT)
Dept: ORTHOPEDICS | Facility: CLINIC | Age: 78
End: 2018-11-15

## 2018-11-15 NOTE — TELEPHONE ENCOUNTER
----- Message from Ivonne Nicholas, RT sent at 2018  3:59 PM CST -----  Contact: Radiology  Patient needs to get his US re-schedule in 2019, but order will  ,,, patient is requesting a call back to get appt re-scheduled,, Thanks

## 2018-11-15 NOTE — TELEPHONE ENCOUNTER
Left a message for the patient to give the office a call back.FP      ----- Message from Felicity Henderson sent at 11/15/2018 10:30 AM CST -----  Contact: self/[phone]  Would like to consult with nurse regarding physical therapy([facility]), please call back at [phone]. Thanks/ar

## 2018-11-15 NOTE — TELEPHONE ENCOUNTER
Returned patient's call to reschedule labs, ultrasound, and follow up with Dr. Amador. No answer; left message to call back.

## 2018-11-15 NOTE — TELEPHONE ENCOUNTER
----- Message from Saumya Bryan sent at 11/15/2018  9:25 AM CST -----  Contact: Pt  Pt was returning a missed call from nurse and would like nurse to return call.

## 2018-11-20 DIAGNOSIS — G47.50 PARASOMNIA: ICD-10-CM

## 2018-11-21 ENCOUNTER — TELEPHONE (OUTPATIENT)
Dept: ADMINISTRATIVE | Facility: CLINIC | Age: 78
End: 2018-11-21

## 2018-11-21 RX ORDER — CLONAZEPAM 0.25 MG/1
TABLET, ORALLY DISINTEGRATING ORAL
Qty: 90 TABLET | Refills: 0 | Status: SHIPPED | OUTPATIENT
Start: 2018-11-21 | End: 2019-01-10 | Stop reason: SDUPTHER

## 2018-11-21 NOTE — TELEPHONE ENCOUNTER
[facility] 10/05/2018 to 12/03/2018 with [facility] , Dr Albaro Alcocer. SN , PT services.

## 2018-11-26 ENCOUNTER — PATIENT MESSAGE (OUTPATIENT)
Dept: ORTHOPEDICS | Facility: CLINIC | Age: 78
End: 2018-11-26

## 2018-11-26 DIAGNOSIS — M16.12 PRIMARY OSTEOARTHRITIS OF LEFT HIP: Primary | ICD-10-CM

## 2018-11-28 ENCOUNTER — HOSPITAL ENCOUNTER (OUTPATIENT)
Dept: RADIOLOGY | Facility: HOSPITAL | Age: 78
Discharge: HOME | End: 2018-11-28
Attending: UROLOGY
Payer: MEDICARE

## 2018-11-28 DIAGNOSIS — R31.9 HEMATURIA, UNSPECIFIED TYPE: ICD-10-CM

## 2018-11-28 PROCEDURE — 76770 US EXAM ABDO BACK WALL COMP: CPT | Mod: TC,HCNC

## 2018-11-28 PROCEDURE — 76770 US EXAM ABDO BACK WALL COMP: CPT | Mod: 26,HCNC,, | Performed by: RADIOLOGY

## 2018-12-10 ENCOUNTER — OFFICE VISIT (OUTPATIENT)
Dept: UROLOGY | Facility: CLINIC | Age: 78
End: 2018-12-10
Payer: MEDICARE

## 2018-12-10 VITALS — BODY MASS INDEX: 26.83 KG/M2 | WEIGHT: 215.81 LBS | HEIGHT: 75 IN

## 2018-12-10 DIAGNOSIS — R31.9 HEMATURIA, UNSPECIFIED TYPE: Primary | ICD-10-CM

## 2018-12-10 DIAGNOSIS — Z12.5 PROSTATE CANCER SCREENING: ICD-10-CM

## 2018-12-10 LAB
BILIRUB SERPL-MCNC: NORMAL MG/DL
BLOOD URINE, POC: NORMAL
COLOR, POC UA: YELLOW
GLUCOSE UR QL STRIP: NORMAL
KETONES UR QL STRIP: NORMAL
LEUKOCYTE ESTERASE URINE, POC: NORMAL
NITRITE, POC UA: NORMAL
PH, POC UA: 5
PROTEIN, POC: NORMAL
SPECIFIC GRAVITY, POC UA: 1.01
UROBILINOGEN, POC UA: NORMAL

## 2018-12-10 PROCEDURE — 1101F PT FALLS ASSESS-DOCD LE1/YR: CPT | Mod: CPTII,HCNC,S$GLB, | Performed by: UROLOGY

## 2018-12-10 PROCEDURE — 81002 URINALYSIS NONAUTO W/O SCOPE: CPT | Mod: HCNC,S$GLB,, | Performed by: UROLOGY

## 2018-12-10 PROCEDURE — 99214 OFFICE O/P EST MOD 30 MIN: CPT | Mod: HCNC,25,S$GLB, | Performed by: UROLOGY

## 2018-12-10 PROCEDURE — 99999 PR PBB SHADOW E&M-EST. PATIENT-LVL III: CPT | Mod: PBBFAC,HCNC,, | Performed by: UROLOGY

## 2018-12-10 NOTE — PROGRESS NOTES
Chief Complaint: Hematuria    HPI:   12/10/18: US redemonstrates a 1.4 cm right renal cyst otherwise no adverse findings.  No more gross hematuria.  Says when he took flomax he voided too much and he stopped it.   for a few weeks and his wife complains of a brown discharge and dysuria.  Tinea cruris complaints.  12/4/17: CT Urogram reassuring.  Cysto today normal.  11/13/17: 75 yo man this summer saw gross hematuria; it stopped after a few days.  Has had sudden urgency just since that time.  No abd/pelvic pain and no exac/rel factors.  No other hematuria.  No urolithiasis.  No urinary bother.  Had urgency/incontinence and saw a urologist for that 2+ years ago and it got better.  US 8/17 showed no stones.    Allergies:  Sulfa (sulfonamide antibiotics)    Medications:  has a current medication list which includes the following prescription(s): allopurinol, aspirin, blood sugar diagnostic, blood-glucose meter, celecoxib, clonazepam, clonazepam, docusate sodium, ferrous sulfate, gabapentin, garlic extract, glipizide, lancets, losartan-hydrochlorothiazide 50-12.5 mg, metformin, metoprolol succinate, multivitamin, nozaseptin, pravastatin, tadalafil, and tamsulosin.    Review of Systems:  General: No fever, chills, fatigability, or weight loss.  Skin: No rashes, itching, or changes in color or texture of skin.  Chest: Denies DAMON, cyanosis, wheezing, cough, and sputum production.  Abdomen: Appetite fine. No weight loss. Denies diarrhea, abdominal pain, hematemesis, or blood in stool.  Musculoskeletal: No joint stiffness or swelling. Denies back pain.  : As above.  All other review of systems negative.    PMH:   has a past medical history of Anemia due to unknown mechanism (11/10/2015), Angina pectoris (2014), Arthritis, Back pain, Coronary artery disease, Encounter for blood transfusion, Gout (12/05/2017), Hyperlipemia, Hypertension, CHARLES (obstructive sleep apnea), Spinal cord disease, Trouble in sleeping, Type 2  diabetes mellitus with diabetic polyneuropathy, without long-term current use of insulin, and Uses hearing aid.    PSH:   has a past surgical history that includes Rotator cuff repair (Left, 2006); Shoulder arthroscopy w/ rotator cuff repair (Right, 2009); Laminectomy (07/22/2016); Hernia repair (Bilateral, 04/18/2017); Joint replacement (Left, 10/09/2017); Back surgery; lumbar foraminotomy (03/2018); left elbow (10/2017); REVISION, TOTAL ARTHROPLASTY, HIP (Left, 9/11/2018); BURSECTOMY - ELBOW (Left, 10/27/2017); ARTHROPLASTY-HIP (ANTERIOR APPROACH) (Left, 10/9/2017); REPAIR-HERNIA-INGUINAL-BILATERAL-LAPAROSCOPIC w/ mesh (Bilateral, 4/18/2017); LAMINECTOMY-LUMBAR-DECOMPRESSION - L3/4 Laminectomy and Cyst Removal L4/5 Laminectomy No Implants SNS: SSEP/EMG Open carlos + Aaron C-arm 2 hours 1st case (N/A, 7/22/2016); Transforaminal Epidural Steroid Injection-Left L3-4 & INJECTION-FACET-Left L3-4 Facet Joint Injection & Cyst Aspiration (Left, 6/14/2016); INJECTION-JOINT (Left, 5/18/2016); and Colonoscopy (N/A, 4/7/2015).    FamHx: family history includes Cancer (age of onset: 50) in his brother; Diabetes in his father, mother, and sister; Drug abuse in his brother; Heart disease in his father and mother; Hypertension in his father and mother; Stroke in his father.    SocHx:  reports that  has never smoked. he has never used smokeless tobacco. He reports that he drinks about 0.6 oz of alcohol per week. He reports that he does not use drugs.      Physical Exam:  There were no vitals filed for this visit.  General: A&Ox3, no apparent distress, no deformities  Neck: No masses, normal thyroid  Lungs: normal inspiration, no use of accessory muscles  Heart: normal pulse, no arrhythmias  Abdomen: Soft, NT, ND  Skin: The skin is warm and dry. No jaundice.  Ext: No c/c/e.  :   12/17: Test desc digna, no abnormalities of epididymus. Penis normal, with normal penile and scrotal skin. Meatus normal. Normal rectal tone, no  hemorrhoids. Prost 30 gm no nodules or masses appreciated. SV not palpable. Perineum and anus normal.    Labs/Studies:   Urinalysis performed in clinic, summary: UA normal  PSA    3/17: 0.53    11/18: 0.6    Impression/Plan:   1. Hematuria workup negative and no recurrence with normal studies.  2. Low risk of prostate cancer, further screening with PCP.  3. Flomax has been stopped voiding fine  4. Reassured about sexual issues  5. Foot pwder for tinea cruris.

## 2018-12-17 ENCOUNTER — HOSPITAL ENCOUNTER (OUTPATIENT)
Dept: RADIOLOGY | Facility: HOSPITAL | Age: 78
Discharge: HOME | End: 2018-12-17
Attending: PODIATRIST
Payer: MEDICARE

## 2018-12-17 ENCOUNTER — OFFICE VISIT (OUTPATIENT)
Dept: PODIATRY | Facility: CLINIC | Age: 78
End: 2018-12-17
Payer: MEDICARE

## 2018-12-17 VITALS
DIASTOLIC BLOOD PRESSURE: 72 MMHG | HEIGHT: 75 IN | RESPIRATION RATE: 16 BRPM | BODY MASS INDEX: 27.4 KG/M2 | WEIGHT: 220.38 LBS | HEART RATE: 74 BPM | SYSTOLIC BLOOD PRESSURE: 130 MMHG

## 2018-12-17 DIAGNOSIS — M24.571 CONTRACTURE, RIGHT ANKLE: ICD-10-CM

## 2018-12-17 DIAGNOSIS — M20.41 HAMMER TOE OF RIGHT FOOT: ICD-10-CM

## 2018-12-17 DIAGNOSIS — M79.671 BILATERAL FOOT PAIN: ICD-10-CM

## 2018-12-17 DIAGNOSIS — M79.671 BILATERAL FOOT PAIN: Primary | ICD-10-CM

## 2018-12-17 DIAGNOSIS — M79.672 BILATERAL FOOT PAIN: ICD-10-CM

## 2018-12-17 DIAGNOSIS — E11.42 TYPE 2 DIABETES MELLITUS WITH DIABETIC POLYNEUROPATHY, WITHOUT LONG-TERM CURRENT USE OF INSULIN: Chronic | ICD-10-CM

## 2018-12-17 DIAGNOSIS — E11.9 ENCOUNTER FOR COMPREHENSIVE DIABETIC FOOT EXAMINATION, TYPE 2 DIABETES MELLITUS: Primary | ICD-10-CM

## 2018-12-17 DIAGNOSIS — M20.11 HALLUX VALGUS (ACQUIRED), RIGHT FOOT: ICD-10-CM

## 2018-12-17 DIAGNOSIS — M24.572 CONTRACTURE, LEFT ANKLE: ICD-10-CM

## 2018-12-17 DIAGNOSIS — M79.672 BILATERAL FOOT PAIN: Primary | ICD-10-CM

## 2018-12-17 DIAGNOSIS — I87.303 VENOUS HYPERTENSION OF LOWER EXTREMITY, BILATERAL: ICD-10-CM

## 2018-12-17 DIAGNOSIS — I87.2 VENOUS INSUFFICIENCY (CHRONIC) (PERIPHERAL): ICD-10-CM

## 2018-12-17 PROCEDURE — 3078F DIAST BP <80 MM HG: CPT | Mod: CPTII,HCNC,S$GLB, | Performed by: PODIATRIST

## 2018-12-17 PROCEDURE — 1101F PT FALLS ASSESS-DOCD LE1/YR: CPT | Mod: CPTII,HCNC,S$GLB, | Performed by: PODIATRIST

## 2018-12-17 PROCEDURE — 99999 PR PBB SHADOW E&M-EST. PATIENT-LVL III: CPT | Mod: PBBFAC,HCNC,, | Performed by: PODIATRIST

## 2018-12-17 PROCEDURE — 73630 X-RAY EXAM OF FOOT: CPT | Mod: 26,50,HCNC, | Performed by: RADIOLOGY

## 2018-12-17 PROCEDURE — 99214 OFFICE O/P EST MOD 30 MIN: CPT | Mod: HCNC,S$GLB,, | Performed by: PODIATRIST

## 2018-12-17 PROCEDURE — 73630 X-RAY EXAM OF FOOT: CPT | Mod: 50,TC,HCNC

## 2018-12-17 PROCEDURE — 3075F SYST BP GE 130 - 139MM HG: CPT | Mod: CPTII,HCNC,S$GLB, | Performed by: PODIATRIST

## 2018-12-17 NOTE — PROGRESS NOTES
Subjective:       Patient ID: Srinath Mcknight is a 78 y.o. male.    Chief Complaint: Foot Pain (Bilateral foot pain rated 5/10, wears SAS shoes, ambulates assisted with cane, Diabetic Pt, PCP Dr. Wilder )      HPI: Srinath Mcknight presents to the office today, under referral by their Primary Care Provider, Yeison Wilder MD, for his annual diabetic foot assessment and risk evalution Patient is a DMII. Patient states neuropathy and venous insufficiency. This patient last saw his/her primary care provider on 11/07/2018.  Complain of profound bilateral lower extremity swelling. States the swelling may be a result of most recent left lower extremity surgery.  States antalgic gait pattern.  Pains are typically 5/10.  Denies local or systemic signs infection.  Denies lower leg venous stasis or ulcerations.  Does not ambulate with compression stockings.  He does take Neurontin for his neuropathy.  Patient does not take a diuretic.    Hemoglobin A1C   Date Value Ref Range Status   10/30/2018 5.6 4.0 - 5.6 % Final     Comment:     ADA Screening Guidelines:  5.7-6.4%  Consistent with prediabetes  >or=6.5%  Consistent with diabetes  High levels of fetal hemoglobin interfere with the HbA1C  assay. Heterozygous hemoglobin variants (HbS, HgC, etc)do  not significantly interfere with this assay.   However, presence of multiple variants may affect accuracy.     09/11/2018 5.5 4.0 - 5.6 % Final     Comment:     ADA Screening Guidelines:  5.7-6.4%  Consistent with prediabetes  >or=6.5%  Consistent with diabetes  High levels of fetal hemoglobin interfere with the HbA1C  assay. Heterozygous hemoglobin variants (HbS, HgC, etc)do  not significantly interfere with this assay.   However, presence of multiple variants may affect accuracy.     04/12/2018 5.6 4.0 - 5.6 % Final     Comment:     According to ADA guidelines, hemoglobin A1c <7.0% represents  optimal control in non-pregnant diabetic patients. Different  metrics may apply to  specific patient populations.   Standards of Medical Care in Diabetes-2016.  For the purpose of screening for the presence of diabetes:  <5.7%     Consistent with the absence of diabetes  5.7-6.4%  Consistent with increasing risk for diabetes   (prediabetes)  >or=6.5%  Consistent with diabetes  Currently, no consensus exists for use of hemoglobin A1c  for diagnosis of diabetes for children.  This Hemoglobin A1c assay has significant interference with fetal   hemoglobin   (HbF). The results are invalid for patients with abnormal amounts of   HbF,   including those with known Hereditary Persistence   of Fetal Hemoglobin. Heterozygous hemoglobin variants (HbAS, HbAC,   HbAD, HbAE, HbA2) do not significantly interfere with this assay;   however, presence of multiple variants in a sample may impact the %   interference.     .    Review of patient's allergies indicates:   Allergen Reactions    Sulfa (sulfonamide antibiotics)      Can't recall from 1995       Past Medical History:   Diagnosis Date    Anemia due to unknown mechanism 11/10/2015    Angina pectoris 2014    not since    Arthritis     Back pain     Coronary artery disease     Encounter for blood transfusion     Gout 12/05/2017    right foot     Hyperlipemia     Hypertension     CHARLES (obstructive sleep apnea)     Spinal cord disease     Trouble in sleeping     Type 2 diabetes mellitus with diabetic polyneuropathy, without long-term current use of insulin     Uses hearing aid     bilat       Family History   Problem Relation Age of Onset    Hypertension Mother     Heart disease Mother     Diabetes Mother     Hypertension Father     Heart disease Father     Diabetes Father     Stroke Father     Diabetes Sister     Cancer Brother 50        pancreas    Drug abuse Brother     Prostate cancer Neg Hx     Macular degeneration Neg Hx     Retinal detachment Neg Hx     Strabismus Neg Hx     Glaucoma Neg Hx     Blindness Neg Hx     Amblyopia  Neg Hx     Kidney disease Neg Hx     Mental illness Neg Hx     Mental retardation Neg Hx     COPD Neg Hx     Asthma Neg Hx        Social History     Socioeconomic History    Marital status:      Spouse name: Not on file    Number of children: 0    Years of education: Not on file    Highest education level: Not on file   Social Needs    Financial resource strain: Not on file    Food insecurity - worry: Not on file    Food insecurity - inability: Not on file    Transportation needs - medical: Not on file    Transportation needs - non-medical: Not on file   Occupational History    Occupation: retired     Comment: teacher   Tobacco Use    Smoking status: Never Smoker    Smokeless tobacco: Never Used   Substance and Sexual Activity    Alcohol use: Yes     Alcohol/week: 0.6 oz     Types: 1 Glasses of wine per week     Comment: rarely  No alcohol prior to surgery    Drug use: No    Sexual activity: Yes     Partners: Female     Birth control/protection: Condom   Other Topics Concern    Not on file   Social History Narrative    Single lives alone. No children. Retired but some part time work - 4 hours a day. 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 Caffeine intake - sugar free cola, Rare coffee intake. Still drives. Does have a Living Will . Has a nephew Jt Gayle, lives in [place]. Has a friend Karina Rossi, locally who could help him.        Past Surgical History:   Procedure Laterality Date    ARTHROPLASTY-HIP (ANTERIOR APPROACH) Left 10/9/2017    Performed by Albaro Alcocer Sr., [facility] at [facility] [place] OR    BACK SURGERY      BURSECTOMY - ELBOW Left 10/27/2017    Performed by Albaro Alcocer Sr., [facility] at [facility] [place] [facility]    Colonoscopy N/A 4/7/2015    Performed by Vahid Romero MD at [facility] [place] ENDO    HERNIA REPAIR Bilateral 04/18/2017    INJECTION-JOINT Left 5/18/2016    Per[facility]rol [facility]    JOINT REPLACEMENT Left 10/09/2017    L YAHIR Dr. Alcocer    LAMINECTOMY  07/22/2016     LAMINECTOMY-LUMBAR-DECOMPRESSION - L3/4 Laminectomy and Cyst Removal L4/5 Laminectomy No Implants SNS: SSEP/EMG Open carlos + Aaron C-arm 2 hours 1st case N/A 7/22/2016    Performed by Sin Bacon [facility] at [facility] OR 2ND FLR    left elbow  10/2017    procedure to remove gout    lumbar foraminotomy  03/2018    REPAIR-HERNIA-INGUINAL-BILATERAL-LAPAROSCOPIC w/ mesh Bilateral 4/18/2017    Performed by Chauncey Ellis Jr., MD at [facility] OR 2ND FLR    REVISION TOTAL HIP ARTHROPLASTY Left 9/11/2018    Procedure: REVISION, TOTAL ARTHROPLASTY, HIP;  Surgeon: Albaro Alcocer Sr., MD;  Location: [facility] OR;  Service: Orthopedics;  Laterality: Left;    REVISION, TOTAL ARTHROPLASTY, HIP Left 9/11/2018    Performed by [facility] at [facility] OR    ROTATOR CUFF REPAIR Left 2006    SHOULDER ARTHROSCOPY W/ ROTATOR CUFF REPAIR Right 2009    Transforaminal Epidural Steroid Injection-Left L3-4 & INJECTION-FACET-Left L3-4 Facet Joint Injection & Cyst Aspiration Left 6/14/2016    Performed by Desmond Hutchinson [facility] at [facility] PAIN MGT       Review of Systems   Constitutional: Negative for chills, fatigue and fever.   HENT: Negative for hearing loss.    Eyes: Negative for photophobia and visual disturbance.   Respiratory: Negative for cough, chest tightness, shortness of breath and wheezing.    Cardiovascular: Positive for leg swelling. Negative for chest pain and palpitations.   Gastrointestinal: Negative for constipation, diarrhea, nausea and vomiting.   Endocrine: Negative for cold intolerance and heat intolerance.   Genitourinary: Negative for flank pain.   Musculoskeletal: Positive for arthralgias and gait problem. Negative for neck pain and neck stiffness.   Skin: Negative for wound.   Neurological: Positive for numbness. Negative for light-headedness and headaches.   Psychiatric/Behavioral: Negative for sleep disturbance.         Objective:   /72 (BP Location: Left arm, Patient Position: Sitting, BP Method: Medium  "(Automatic))   Pulse 74   Resp 16   Ht 6' 3" (1.905 m)   Wt 100 kg (220 lb 5.6 oz)   BMI 27.54 kg/m²     LOWER EXTREMITY PHYSICAL EXAMINATION    ORTHOPEDIC: Manual Muscle Testing is 5/5 in all planes on the left and right, without pains, with and without resistance. No pains to palpation of the medial or lateral ankle ligaments. No discomfort to palpation of the posterior tibial tendon, peroneal tendon, Achilles tendon or the anterior ankle tendons.  Pes planus foot type is noted. No pain upon range of motion of the midfoot or hindfoot joints. No crepitus upon range of motion and midfoot or hindfoot joints. No ankle pathology is noted. Bilateral lower extremity HAV, right greater than left.  Rigid hammertoe contracture, right 2nd.  Wide splay forefoot type.  Gait pattern is non-antalgic.    VASCULAR: Capillary refill time is less than 3s. Edema is 2+ pitting and moderate. The left dorsalis pedis pulse is 1/4 and the left posterior tibial pulse is 0/4. The right dorsalis pedis is 1/4 and the right posterior tibial pulse is 0/4. Varicosities are noted to the bilateral lower extremity. Spider veins and telangiectasias are noted. Hair growth is decreased. Skin appearance is WNL. Proximal to distal warm to warm. Elevation palor is absent. Dependent rubor is absent.    NEUROLOGY: Proprioception is intact. Sensation to light touch is intact. Sensation to pin prick is decreased. Vibratory sensation is decreased to the left and right lower extremity. Examination with 5.07 [place] monofilament reveals that protective sensation is not intact to the left and right plantar surfaces of the foot and digits, as the patient has decreased sensation/detection at greater than 4 distinct points of contact.     DERMATOLOGY: Skin is supple, moist, dry and intact.     Physical Exam    Assessment:     1. Encounter for comprehensive diabetic foot examination, type 2 diabetes mellitus    2. Type 2 diabetes mellitus with diabetic " polyneuropathy, without long-term current use of insulin    3. Hallux valgus (acquired), right foot    4. Hammer toe of right foot    5. Contracture, left ankle    6. Contracture, right ankle    7. Venous insufficiency (chronic) (peripheral)    8. Venous hypertension of lower extremity, bilateral        Plan:     Encounter for comprehensive diabetic foot examination, type 2 diabetes mellitus  Type 2 diabetes mellitus with diabetic polyneuropathy, without long-term current use of insulin  Thorough discussion is had with the patient today, concerning the diagnosis, its etiology, and the treatment algorithm at present.  Patient advised to follow up with Primary Care Physician for management of comorbid states.    I counseled the patient on his/her Diabetic Mellitus regarding today's clinical examination and objection findings. We did also discuss recent medication changes, pertinent labs and imaging evaluations and other medical consultation notes and progress notes. Greater than 50% of this visit was spent on counseling and coordination of care. Greater than 20 minutes of this appt. was spent on education about the diabetic foot, in relation to PVD and/or neuropathy, and the prevention of limb loss.    Shoe gear is inspected and wear and proper fit/type. Patient is reminded of the importance of good nutrition and blood sugar control to help prevent podiatric complications of diabetes. Patient instructed on proper foot hygeine. We discussed wearing proper shoe gear, daily foot inspections, never walking without protective shoe gear, never putting sharp instruments to feet.  Patient  will continue to monitor the areas daily, inspect feet, wear protective shoe gear when ambulatory, moisturizer to maintain skin integrity.    (Diabetic) Foot counseling and education is provided at this visit. Patient is advised to wear socks and shoes at all times.  Do not walk barefoot, or with just socks, even when indoors.  Be sure to  check and inspect the inside of the shoe before putting them on her feet.  Protect your feet at all times.  Walking shoes and/or athletic shoes are the best types of shoe gear. Do not wear vinyl or plastic type shoe gear, as they do not stretch and/or breathe.  Protect your feet from hot and/or cold. Elevate the extremities when sitting.  Do not wear excessively tight socks and/or shoe gear. Wiggle your toes for a few minutes throughout the day. Move your ankles up and down, in and out, to help blood flow in your lower extremity.        Hallux valgus (acquired), right foot  Hammer toe of right foot  Contracture, left ankle  Contracture, right ankle  Did discuss proper and supportive shoe gear in detail and at length with the patient.  These are shoes with firm and robust arch support; medial counter.  Shoes which only bend at the metatarsophalangeal joint and which are rigid in the midfoot and hindfoot. Patient urged to purchase running type or cross training type shoes gear which are designed for pronation control.    Rec. shoe gear with soft and accommodative foot bed and with a high toe box for alleviation of symptoms. Possible EE or EEE in width as well for alleviation of symptoms.    This patient does have hammertoe (digital) contractures. I did advise the patient to ambulate with shoe gear that is high in the tox box to allow for extra room and depth in the sagittal plane, in order to alleviate and lessen the potential for dorsal digital break down at the IPJs. I do also recommended shoe gear that is soft and supple in the foot bed as to lessen the potential for plantar distal digital break down at the contracted digits. If the patient does not feel the aforementioned is necessary, he or she may also purchase OTC padding devices to be worn across the MTPJ, at the distal aspects of the digits, and/or at the dorsal aspects of the IPJs. The patient does acknowledge understanding and is said to be amenable to  compliance.       Venous insufficiency (chronic) (peripheral)  Venous hypertension of lower extremity, bilateral  Thorough discussion is had with the patient today, concerning the diagnosis, its etiology, and the treatment algorithm at present.    Did discuss possible initiation and/or continuation of lower extremity compression therapy (stockings).  Recommend continue w/ limb elevation.  Consider initiation oral diuretic if no overt contraindication.  I encouraged the patient to follow-up and discuss with his/her PCP.    Prescription is written for bilateral lower extremity compression stockings 15mmHg-20mmHg.  Follow up in approximately 1 month.       Protective Sensation (w/ 10 gram monofilament):  Right: Decreased  Left: Decreased    Visual Inspection:  None -  Bilateral    Pedal Pulses:   Right: Diminshed  Left: Diminshed    Posterior tibialis:   Right:Absent  Left: Present         Future Appointments   Date Time Provider Department Center   12/31/2018  1:10 PM PORFIRIO OLIVEROS [facility]   12/31/2018  2:00 PM Megha George NP [facility] HEM ONC [facility]   1/10/2019 10:20 AM Martin Machuca MD [facility] SLEEP [facility]   1/22/2019 11:30 AM Albaro Alcocer Sr., MD ON [facility]

## 2018-12-26 ENCOUNTER — TELEPHONE (OUTPATIENT)
Dept: PULMONOLOGY | Facility: CLINIC | Age: 78
End: 2018-12-26

## 2019-01-03 ENCOUNTER — LAB VISIT (OUTPATIENT)
Dept: LAB | Facility: HOSPITAL | Age: 79
End: 2019-01-03
Attending: NURSE PRACTITIONER
Payer: MEDICARE

## 2019-01-03 ENCOUNTER — OFFICE VISIT (OUTPATIENT)
Dept: HEMATOLOGY/ONCOLOGY | Facility: CLINIC | Age: 79
End: 2019-01-03
Payer: MEDICARE

## 2019-01-03 VITALS
TEMPERATURE: 98 F | WEIGHT: 220 LBS | HEART RATE: 71 BPM | BODY MASS INDEX: 27.5 KG/M2 | SYSTOLIC BLOOD PRESSURE: 122 MMHG | DIASTOLIC BLOOD PRESSURE: 71 MMHG | OXYGEN SATURATION: 98 %

## 2019-01-03 DIAGNOSIS — D53.9 NUTRITIONAL ANEMIA: ICD-10-CM

## 2019-01-03 DIAGNOSIS — D64.9 CHRONIC ANEMIA: Primary | ICD-10-CM

## 2019-01-03 DIAGNOSIS — D64.9 ANEMIA DUE TO UNKNOWN MECHANISM: ICD-10-CM

## 2019-01-03 LAB
ALBUMIN SERPL BCP-MCNC: 3.8 G/DL
ALP SERPL-CCNC: 78 U/L
ALT SERPL W/O P-5'-P-CCNC: 13 U/L
ANION GAP SERPL CALC-SCNC: 9 MMOL/L
AST SERPL-CCNC: 21 U/L
BASOPHILS # BLD AUTO: 0.03 K/UL
BASOPHILS NFR BLD: 0.6 %
BILIRUB SERPL-MCNC: 0.3 MG/DL
BUN SERPL-MCNC: 19 MG/DL
CALCIUM SERPL-MCNC: 9.1 MG/DL
CHLORIDE SERPL-SCNC: 106 MMOL/L
CO2 SERPL-SCNC: 28 MMOL/L
CREAT SERPL-MCNC: 1.4 MG/DL
DIFFERENTIAL METHOD: ABNORMAL
EOSINOPHIL # BLD AUTO: 0.3 K/UL
EOSINOPHIL NFR BLD: 5.2 %
ERYTHROCYTE [DISTWIDTH] IN BLOOD BY AUTOMATED COUNT: 15.2 %
EST. GFR  (AFRICAN AMERICAN): 56 ML/MIN/1.73 M^2
EST. GFR  (NON AFRICAN AMERICAN): 48 ML/MIN/1.73 M^2
GLUCOSE SERPL-MCNC: 80 MG/DL
HCT VFR BLD AUTO: 35.9 %
HGB BLD-MCNC: 11.9 G/DL
LYMPHOCYTES # BLD AUTO: 2.3 K/UL
LYMPHOCYTES NFR BLD: 44.7 %
MCH RBC QN AUTO: 28.2 PG
MCHC RBC AUTO-ENTMCNC: 33.1 G/DL
MCV RBC AUTO: 85 FL
MONOCYTES # BLD AUTO: 0.6 K/UL
MONOCYTES NFR BLD: 11.9 %
NEUTROPHILS # BLD AUTO: 2 K/UL
NEUTROPHILS NFR BLD: 37.6 %
PLATELET # BLD AUTO: 208 K/UL
PMV BLD AUTO: 9.5 FL
POTASSIUM SERPL-SCNC: 4.6 MMOL/L
PROT SERPL-MCNC: 6.7 G/DL
RBC # BLD AUTO: 4.22 M/UL
SODIUM SERPL-SCNC: 143 MMOL/L
WBC # BLD AUTO: 5.19 K/UL

## 2019-01-03 PROCEDURE — 36415 COLL VENOUS BLD VENIPUNCTURE: CPT | Mod: HCNC

## 2019-01-03 PROCEDURE — 85025 COMPLETE CBC W/AUTO DIFF WBC: CPT | Mod: HCNC

## 2019-01-03 PROCEDURE — 99214 OFFICE O/P EST MOD 30 MIN: CPT | Mod: HCNC,S$GLB,, | Performed by: NURSE PRACTITIONER

## 2019-01-03 PROCEDURE — 3074F SYST BP LT 130 MM HG: CPT | Mod: CPTII,HCNC,S$GLB, | Performed by: NURSE PRACTITIONER

## 2019-01-03 PROCEDURE — 3078F DIAST BP <80 MM HG: CPT | Mod: CPTII,HCNC,S$GLB, | Performed by: NURSE PRACTITIONER

## 2019-01-03 PROCEDURE — 80053 COMPREHEN METABOLIC PANEL: CPT | Mod: HCNC

## 2019-01-03 PROCEDURE — 3078F PR MOST RECENT DIASTOLIC BLOOD PRESSURE < 80 MM HG: ICD-10-PCS | Mod: CPTII,HCNC,S$GLB, | Performed by: NURSE PRACTITIONER

## 2019-01-03 PROCEDURE — 99999 PR PBB SHADOW E&M-EST. PATIENT-LVL III: ICD-10-PCS | Mod: PBBFAC,HCNC,, | Performed by: NURSE PRACTITIONER

## 2019-01-03 PROCEDURE — 1101F PT FALLS ASSESS-DOCD LE1/YR: CPT | Mod: CPTII,HCNC,S$GLB, | Performed by: NURSE PRACTITIONER

## 2019-01-03 PROCEDURE — 1101F PR PT FALLS ASSESS DOC 0-1 FALLS W/OUT INJ PAST YR: ICD-10-PCS | Mod: CPTII,HCNC,S$GLB, | Performed by: NURSE PRACTITIONER

## 2019-01-03 PROCEDURE — 99214 PR OFFICE/OUTPT VISIT, EST, LEVL IV, 30-39 MIN: ICD-10-PCS | Mod: HCNC,S$GLB,, | Performed by: NURSE PRACTITIONER

## 2019-01-03 PROCEDURE — 3074F PR MOST RECENT SYSTOLIC BLOOD PRESSURE < 130 MM HG: ICD-10-PCS | Mod: CPTII,HCNC,S$GLB, | Performed by: NURSE PRACTITIONER

## 2019-01-03 PROCEDURE — 99999 PR PBB SHADOW E&M-EST. PATIENT-LVL III: CPT | Mod: PBBFAC,HCNC,, | Performed by: NURSE PRACTITIONER

## 2019-01-03 NOTE — LETTER
January 3, 2019      Albaro Alcocer Sr., MD  [address]  [facility] [address]           [place] [facility] Hematology Oncology  [id number] [place] [id number]  Phone: [phone]  Fax: [phone]          Patient: Srinath Mcknight   MR Number: [id number]   YOB: 1940   Date of Visit: 1/3/2019       Dear Dr. Albaro Alcocer Sr.:    Thank you for referring Srinath Mcknight to me for evaluation. Attached you will find relevant portions of my assessment and plan of care.    If you have questions, please do not hesitate to call me. I look forward to following Srinath Mcknight along with you.    Sincerely,    Megha George, OSCAR    Enclosure  CC:  No Recipients    If you would like to receive this communication electronically, please contact [email or web address] or [phone] to request more information on [employer] access.    For providers and/or their staff who would like to refer a patient to [facility], please contact us through our one-stop-shop provider referral line, Shaka Marcial, at [phone].    If you feel you have received this communication in error or would no longer like to receive these types of communications, please e-mail [email or web address]

## 2019-01-03 NOTE — PROGRESS NOTES
Subjective:       Patient ID: Srinath Mcknight is a 78 y.o. male.    Chief Complaint:  lab results review.    HPI: 77 y.o. Male with HTN, OA, chronic anemia, DM, CAD, HLD who presents to the Heme-Onc clinic for follow up of his anemia of unknown origin. The patient has had workup for chronic anemia of unknown origin. The laboratory studies are unremarkable. The patient has two negative stool occult blood testing on file. The patient continues to take daily iron pills. He tells me he was instructed to take iron pills by his PCP but has no knowledge of iron deficiency history.  Upon review of the medical record the patient has not had documented iron deficiency but iron levels are low normal.      Social History     Socioeconomic History    Marital status:      Spouse name: Not on file    Number of children: 0    Years of education: Not on file    Highest education level: Not on file   Social Needs    Financial resource strain: Not on file    Food insecurity - worry: Not on file    Food insecurity - inability: Not on file    Transportation needs - medical: Not on file    Transportation needs - non-medical: Not on file   Occupational History    Occupation: retired     Comment: teacher   Tobacco Use    Smoking status: Never Smoker    Smokeless tobacco: Never Used   Substance and Sexual Activity    Alcohol use: Yes     Alcohol/week: 0.6 oz     Types: 1 Glasses of wine per week     Comment: rarely  No alcohol prior to surgery    Drug use: No    Sexual activity: Yes     Partners: Female     Birth control/protection: Condom   Other Topics Concern    Not on file   Social History Narrative    Single lives alone. No children. Retired but some part time work - 4 hours a day. 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. Caffeine intake - sugar free cola, Rare coffee intake. Still drives. Does have a Living Will . Has a nephew Jt Gayle, lives in [place]. Has a friend Karinarebeca Rossi, locally who could help him.         Past Medical History:   Diagnosis Date    Anemia due to unknown mechanism 11/10/2015    Angina pectoris 2014    not since    Arthritis     Back pain     Coronary artery disease     Encounter for blood transfusion     Gout 12/05/2017    right foot     Hyperlipemia     Hypertension     CHARLES (obstructive sleep apnea)     Spinal cord disease     Trouble in sleeping     Type 2 diabetes mellitus with diabetic polyneuropathy, without long-term current use of insulin     Uses hearing aid     bilat       Family History   Problem Relation Age of Onset    Hypertension Mother     Heart disease Mother     Diabetes Mother     Hypertension Father     Heart disease Father     Diabetes Father     Stroke Father     Diabetes Sister     Cancer Brother 50        pancreas    Drug abuse Brother     Prostate cancer Neg Hx     Macular degeneration Neg Hx     Retinal detachment Neg Hx     Strabismus Neg Hx     Glaucoma Neg Hx     Blindness Neg Hx     Amblyopia Neg Hx     Kidney disease Neg Hx     Mental illness Neg Hx     Mental retardation Neg Hx     COPD Neg Hx     Asthma Neg Hx        Past Surgical History:   Procedure Laterality Date    ARTHROPLASTY-HIP (ANTERIOR APPROACH) Left 10/9/2017    Performed [facility] Albar[facility] Alcocer Sr[facility] OR    BACK SURGERY      BURSECTOMY - ELBOW Left 10/27/2017    Performed by Albar[facility]coc[facility] at [facility] OR    Colonoscopy N/A 4/7/2015    Performed by Vahid Romero MD at [facility] ENDO    HERNIA REPAIR Bilateral 04/18/2017    INJECTION-JOINT Left 5/18/2016    Performed [facility]rol [facility]era [facility] at [facility] PAIN MGT    JOINT REPLACEMENT Left 10/09/2017    L YAHIR Dr. Alcocer    LAMINECTOMY  07/22/2016    LAMINECTOMY-LUMBAR-DECOMPRESSION - L3/4 Laminectomy and Cyst Removal L4/5 Laminectomy No Implants SNS: SSEP/EMG Open carlos + Aaron C-arm 2 hours 1st case N/A 7/22/2016    Performed [facility] Sin Bacon [facility] at [facility] OR 2ND FLR    left elbow  10/2017    procedure  to remove gout    lumbar foraminotomy  03/2018    REPAIR-HERNIA-INGUINAL-BILATERAL-LAPAROSCOPIC w/ mesh Bilateral 4/18/2017    Per[facility] Ellis [facility] OR 2ND FLR    REVISION, TOTAL ARTHROPLASTY, HIP Left 9/11/2018    Per[facility] at [facility] OR    ROTATOR CUFF REPAIR Left 2006    SHOULDER ARTHROSCOPY W/ ROTATOR CUFF REPAIR Right 2009    Transforaminal Epidural Steroid Injection-Left L3-4 & INJECTION-FACET-Left L3-4 Facet Joint Injection & Cyst Aspiration Left 6/14/2016    Performed by Desmond Hutchinson [facility] at [facility] PAIN MGT       Review of Systems   Constitutional: Negative for activity change, appetite change, chills, diaphoresis, fatigue, fever and unexpected weight change.   HENT: Negative for nosebleeds, sore throat, trouble swallowing and voice change.    Eyes: Negative for visual disturbance.   Respiratory: Negative for cough, choking, chest tightness, shortness of breath, wheezing and stridor.    Cardiovascular: Negative for chest pain and leg swelling.   Gastrointestinal: Negative for abdominal pain, blood in stool, constipation, diarrhea, nausea and vomiting.   Genitourinary: Negative for difficulty urinating, dysuria and hematuria.   Musculoskeletal: Positive for arthralgias and gait problem. Negative for myalgias.        Patient walks with a cane   Skin: Negative for rash and wound.   Neurological: Positive for weakness. Negative for dizziness and headaches.   Hematological: Does not bruise/bleed easily.   Psychiatric/Behavioral: The patient is nervous/anxious.             Medication List           Accurate as of 1/3/19  4:46 PM. If you have any questions, ask your nurse or doctor.               CHANGE how you take these medications    docusate sodium 100 MG capsule  Commonly known as:  COLACE  Take 1 capsule (100 mg total) by mouth 2 (two) times daily as needed.  What changed:    · when to take this  · additional instructions     pravastatin 40 MG  tablet  Commonly known as:  PRAVACHOL  TAKE 1 TABLET EVERY DAY  What changed:    · how much to take  · how to take this  · when to take this        CONTINUE taking these medications    allopurinol 100 MG tablet  Commonly known as:  ZYLOPRIM  Take 1 tablet (100 mg total) by mouth once daily.     aspirin 81 MG EC tablet  Commonly known as:  ECOTRIN  Take 1 tablet (81 mg total) by mouth once daily.     blood sugar diagnostic Strp  Commonly known as:  ACCU-CHEK FRANKO PLUS TEST STRP  USE  1  STRIP ONE TIME DAILY     blood-glucose meter Misc  Commonly known as:  ACCU-CHEK FRANKO PLUS METER  1 Device by Misc.(Non-Drug; Combo Route) route once daily.     celecoxib 200 MG capsule  Commonly known as:  CeleBREX  TAKE 1 CAPSULE(200 MG) BY MOUTH EVERY DAY     ferrous sulfate 324 mg (65 mg iron) Tbec  Take 1 tablet (324 mg total) by mouth once daily.     gabapentin 300 MG capsule  Commonly known as:  NEURONTIN     GARLIC EXTRACT ORAL     glipiZIDE 5 MG tablet  Commonly known as:  GLUCOTROL  TAKE 2 TABLETS (10 MG) TWO TIMES DAILY BEFORE MEALS     * KLONOPIN ORAL     * clonazePAM 0.25 MG Tbdl  Commonly known as:  KLONOPIN  DISSOLVE 2 TABLETS BY MOUTH EVERY NIGHT AT BEDTIME     lancets Misc  Commonly known as:  ACCU-CHEK SOFTCLIX LANCETS  1 lancet by Misc.(Non-Drug; Combo Route) route once daily.     losartan-hydrochlorothiazide 50-12.5 mg 50-12.5 mg per tablet  Commonly known as:  HYZAAR  TAKE 1 TABLET EVERY DAY     metFORMIN 1000 MG tablet  Commonly known as:  GLUCOPHAGE  TAKE 1 TABLET TWICE DAILY WITH MEALS     metoprolol succinate 50 MG 24 hr tablet  Commonly known as:  TOPROL-XL  TAKE 1 TABLET EVERY DAY     nozaseptin nasal   Commonly known as:  NOZIN  1 each by Each Nare route 2 (two) times daily. Complete supply provided by hospital     ONE DAILY MULTIVITAMIN per tablet  Generic drug:  multivitamin     tadalafil 5 MG tablet  Commonly known as:  CIALIS  Take 1 tablet (5 mg total) by mouth daily as needed for Erectile  Dysfunction.         * This list has 2 medication(s) that are the same as other medications prescribed for you. Read the directions carefully, and ask your doctor or other care provider to review them with you.            STOP taking these medications    tamsulosin 0.4 mg Cap  Commonly known as:  FLOMAX  Stopped by:  Megha George NP          Objective:     Vitals:    01/03/19 1415   BP: 122/71   Pulse: 71   Temp: 98 °F (36.7 °C)     Lab Results   Component Value Date    WBC 5.19 01/03/2019    HGB 11.9 (L) 01/03/2019    HCT 35.9 (L) 01/03/2019    MCV 85 01/03/2019     01/03/2019     CMP  Sodium   Date Value Ref Range Status   01/03/2019 143 136 - 145 mmol/L Final     Potassium   Date Value Ref Range Status   01/03/2019 4.6 3.5 - 5.1 mmol/L Final     Chloride   Date Value Ref Range Status   01/03/2019 106 95 - 110 mmol/L Final     CO2   Date Value Ref Range Status   01/03/2019 28 23 - 29 mmol/L Final     Glucose   Date Value Ref Range Status   01/03/2019 80 70 - 110 mg/dL Final     BUN, Bld   Date Value Ref Range Status   01/03/2019 19 8 - 23 mg/dL Final     Creatinine   Date Value Ref Range Status   01/03/2019 1.4 0.5 - 1.4 mg/dL Final     Calcium   Date Value Ref Range Status   01/03/2019 9.1 8.7 - 10.5 mg/dL Final     Total Protein   Date Value Ref Range Status   01/03/2019 6.7 6.0 - 8.4 g/dL Final     Albumin   Date Value Ref Range Status   01/03/2019 3.8 3.5 - 5.2 g/dL Final     Total Bilirubin   Date Value Ref Range Status   01/03/2019 0.3 0.1 - 1.0 mg/dL Final     Comment:     For infants and newborns, interpretation of results should be based  on gestational age, weight and in agreement with clinical  observations.  Premature Infant recommended reference ranges:  Up to 24 hours.............<8.0 mg/dL  Up to 48 hours............<12.0 mg/dL  3-5 days..................<15.0 mg/dL  6-29 days.................<15.0 mg/dL       Alkaline Phosphatase   Date Value Ref Range Status   01/03/2019 78 55 - 135 U/L  Final     AST   Date Value Ref Range Status   01/03/2019 21 10 - 40 U/L Final     ALT   Date Value Ref Range Status   01/03/2019 13 10 - 44 U/L Final     Anion Gap   Date Value Ref Range Status   01/03/2019 9 8 - 16 mmol/L Final     eGFR if    Date Value Ref Range Status   01/03/2019 56 (A) >60 mL/min/1.73 m^2 Final     eGFR if non    Date Value Ref Range Status   01/03/2019 48 (A) >60 mL/min/1.73 m^2 Final     Comment:     Calculation used to obtain the estimated glomerular filtration  rate (eGFR) is the CKD-EPI equation.      Pathologist Interpretation SPE   Order: [id number]   Status:  Final result   Visible to patient:  Yes (Patient Portal)   Next appt:  08/14/2018 at 03:30 PM in Radiology ([facility] XR[facility])   Component 2wk ago   Pathologist Interpretation SPE REVIEWED    Comment: Electronically reviewed and signed by:   Leny Cooley M.D.   Signed on 07/31/18 at 14:30   Normal total protein, normal pattern.                Physical Exam   Constitutional: He is oriented to person, place, and time. He appears well-developed and well-nourished. He is cooperative. He appears distressed.   HENT:   Head: Normocephalic.   Right Ear: Hearing normal.   Left Ear: Hearing normal.   Nose: Nose normal.   Mouth/Throat: Oropharynx is clear and moist.   Eyes: Conjunctivae, EOM and lids are normal. Right eye exhibits no discharge. Left eye exhibits no discharge.   Neck: Normal range of motion. No thyroid mass present.   Cardiovascular: Normal rate, regular rhythm and normal heart sounds. Exam reveals no gallop and no friction rub.   No murmur heard.  Pulmonary/Chest: Effort normal and breath sounds normal. No respiratory distress. He has no wheezes. He has no rales. He exhibits no tenderness.   Abdominal: Soft. He exhibits no distension and no mass. There is no tenderness. There is no guarding.   Genitourinary:   Genitourinary Comments: deferred   Musculoskeletal: Normal range of motion. He exhibits no  edema.   Patient walks with a cane.    Neurological: He is alert and oriented to person, place, and time.   Skin: Skin is warm, dry and intact.   Psychiatric: His speech is normal and behavior is normal. Thought content normal.   Vitals reviewed.       Assessment:     Problem List Items Addressed This Visit     Anemia due to unknown mechanism - Primary     Patient hemoglobin today is 11.4. Patient's lab work for anemia is negative. Patient has normal B12, folate, Iron studies. SPEP normal. I believe that the patient anemia is ethnicity related. I will see the patient back in 3 months to evaluate CBC, CMP.         Relevant Orders    CBC auto differential (Completed)    CBC auto differential    Comprehensive metabolic panel        Elevated serum creatinine    The patient has had fluctuating creatine levels/eGFR for at least the past 10 months. The patient states he does not drink water often. I believe the etiology to be dehydration. I have counseled the patient on increasing daily fluid intake to prevent kidney injury. I had a detailed discussion with patient related to this and counseled him on techniques that may help him consume more fluids daily. I will see patient back in 3 months and check CMP. If his eGFR is decreased at this time I will consider nephrology consult.              I will review assessment/plan with collaborating physician     ZEINAB Yuen

## 2019-01-03 NOTE — PROGRESS NOTES
Subjective:       Patient ID: Srinath Mcknight is a 78 y.o. male.    Chief Complaint: F/U anemia    HPI:  78 y.o. Male with HTN, OA, chronic anemia, DM, CAD, HLD who presents to the Heme-Onc clinic for follow up of his anemia of unknown origin. The patient has had workup for chronic anemia of unknown origin. The laboratory studies are unremarkable. The patient has two negative stool occult blood testing on file. The patient continues to take daily iron pills. He tells me he was instructed to take iron pills by his PCP but has no knowledge of iron deficiency history.  Upon review of the medical record the patient has not had documented iron deficiency but iron levels are low normal.      Social History     Socioeconomic History    Marital status:      Spouse name: Not on file    Number of children: 0    Years of education: Not on file    Highest education level: Not on file   Social Needs    Financial resource strain: Not on file    Food insecurity - worry: Not on file    Food insecurity - inability: Not on file    Transportation needs - medical: Not on file    Transportation needs - non-medical: Not on file   Occupational History    Occupation: retired     Comment: teacher   Tobacco Use    Smoking status: Never Smoker    Smokeless tobacco: Never Used   Substance and Sexual Activity    Alcohol use: Yes     Alcohol/week: 0.6 oz     Types: 1 Glasses of wine per week     Comment: rarely  No alcohol prior to surgery    Drug use: No    Sexual activity: Yes     Partners: Female     Birth control/protection: Condom   Other Topics Concern    Not on file   Social History Narrative    Single lives alone. No children. Retired but some part time work - 4 hours a day. 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 Caffeine intake - sugar free cola, Rare coffee intake. Still drives. Does have a Living Will . Has a nephew Jt Gayle, lives in [place]. Has a friend Karina Rossi, locally who could help him.        Past  Medical History:   Diagnosis Date    Anemia due to unknown mechanism 11/10/2015    Angina pectoris 2014    not since    Arthritis     Back pain     Coronary artery disease     Encounter for blood transfusion     Gout 12/05/2017    right foot     Hyperlipemia     Hypertension     CHARLES (obstructive sleep apnea)     Spinal cord disease     Trouble in sleeping     Type 2 diabetes mellitus with diabetic polyneuropathy, without long-term current use of insulin     Uses hearing aid     bilat       Family History   Problem Relation Age of Onset    Hypertension Mother     Heart disease Mother     Diabetes Mother     Hypertension Father     Heart disease Father     Diabetes Father     Stroke Father     Diabetes Sister     Cancer Brother 50        pancreas    Drug abuse Brother     Prostate cancer Neg Hx     Macular degeneration Neg Hx     Retinal detachment Neg Hx     Strabismus Neg Hx     Glaucoma Neg Hx     Blindness Neg Hx     Amblyopia Neg Hx     Kidney disease Neg Hx     Mental illness Neg Hx     Mental retardation Neg Hx     COPD Neg Hx     Asthma Neg Hx        Past Surgical History:   Procedure Laterality Date    ARTHROPLASTY-HIP (ANTERIOR APPROACH) Left 10/9/2017    Per[facility]baro Alcocer Sr[facility] at [facility] OR    BACK SURGERY      BURSECTOMY - ELBOW Left 10/27/2017    Performed by [facility]coc[facility] Sr[facility] at [facility] OR    Colonoscopy N/A 4/7/2015    Performed by Vahid Romero MD at [facility] ENDO    HERNIA REPAIR Bilateral 04/18/2017    INJECTION-JOINT Left 5/18/2016    Performed by Errol Madera MD at [facility] [place] PAIN MGT    JOINT REPLACEMENT Left 10/09/2017    L YAHIR Dr. Alcocer    LAMINECTOMY  07/22/2016    LAMINECTOMY-LUMBAR-DECOMPRESSION - L3/4 Laminectomy and Cyst Removal L4/5 Laminectomy No Implants SNS: SSEP/EMG Open carlos + Aaron C-arm 2 hours 1st case N/A 7/22/2016    Performed by Sin Bacon [facility] at [facility] OR 2ND FLR    left elbow  10/2017    procedure to remove  gout    lumbar foraminotomy  03/2018    REPAIR-HERNIA-INGUINAL-BILATERAL-LAPAROSCOPIC w/ mesh Bilateral 4/18/2017    Performed by Chauncey Ellis Jr., [facility] at [facility] OR 2ND FLR    REVISION, TOTAL ARTHROPLASTY, HIP Left 9/11/2018    Per[facility] at [facility] OR    ROTATOR CUFF REPAIR Left 2006    SHOULDER ARTHROSCOPY W/ ROTATOR CUFF REPAIR Right 2009    Transforaminal Epidural Steroid Injection-Left L3-4 & INJECTION-FACET-Left L3-4 Facet Joint Injection & Cyst Aspiration Left 6/14/2016    Performed by Desmond Hutchinson MD at [facility] PAIN MGT       Review of Systems   Constitutional: Negative for activity change, appetite change, chills, diaphoresis, fatigue, fever and unexpected weight change.   HENT: Negative for nosebleeds, sore throat, trouble swallowing and voice change.    Eyes: Negative for visual disturbance.   Respiratory: Negative for cough, chest tightness and shortness of breath.    Cardiovascular: Negative for chest pain and leg swelling.   Gastrointestinal: Negative for abdominal pain, blood in stool, constipation, diarrhea, nausea and vomiting.   Genitourinary: Negative for difficulty urinating, dysuria and hematuria.   Musculoskeletal: Positive for arthralgias and gait problem. Negative for myalgias.        Patient walks with a cane   Skin: Negative for rash and wound.   Neurological: Positive for weakness. Negative for dizziness and headaches.   Hematological: Does not bruise/bleed easily.   Psychiatric/Behavioral: The patient is nervous/anxious.             Medication List           Accurate as of 1/3/19  4:53 PM. If you have any questions, ask your nurse or doctor.               CHANGE how you take these medications    docusate sodium 100 MG capsule  Commonly known as:  COLACE  Take 1 capsule (100 mg total) by mouth 2 (two) times daily as needed.  What changed:    · when to take this  · additional instructions     pravastatin 40 MG tablet  Commonly known as:  PRAVACHOL  TAKE 1  TABLET EVERY DAY  What changed:    · how much to take  · how to take this  · when to take this        CONTINUE taking these medications    allopurinol 100 MG tablet  Commonly known as:  ZYLOPRIM  Take 1 tablet (100 mg total) by mouth once daily.     aspirin 81 MG EC tablet  Commonly known as:  ECOTRIN  Take 1 tablet (81 mg total) by mouth once daily.     blood sugar diagnostic Strp  Commonly known as:  ACCU-CHEK FRANKO PLUS TEST STRP  USE  1  STRIP ONE TIME DAILY     blood-glucose meter Misc  Commonly known as:  ACCU-CHEK FRANKO PLUS METER  1 Device by Misc.(Non-Drug; Combo Route) route once daily.     celecoxib 200 MG capsule  Commonly known as:  CeleBREX  TAKE 1 CAPSULE(200 MG) BY MOUTH EVERY DAY     ferrous sulfate 324 mg (65 mg iron) Tbec  Take 1 tablet (324 mg total) by mouth once daily.     gabapentin 300 MG capsule  Commonly known as:  NEURONTIN     GARLIC EXTRACT ORAL     glipiZIDE 5 MG tablet  Commonly known as:  GLUCOTROL  TAKE 2 TABLETS (10 MG) TWO TIMES DAILY BEFORE MEALS     * KLONOPIN ORAL     * clonazePAM 0.25 MG Tbdl  Commonly known as:  KLONOPIN  DISSOLVE 2 TABLETS BY MOUTH EVERY NIGHT AT BEDTIME     lancets Misc  Commonly known as:  ACCU-CHEK SOFTCLIX LANCETS  1 lancet by Misc.(Non-Drug; Combo Route) route once daily.     losartan-hydrochlorothiazide 50-12.5 mg 50-12.5 mg per tablet  Commonly known as:  HYZAAR  TAKE 1 TABLET EVERY DAY     metFORMIN 1000 MG tablet  Commonly known as:  GLUCOPHAGE  TAKE 1 TABLET TWICE DAILY WITH MEALS     metoprolol succinate 50 MG 24 hr tablet  Commonly known as:  TOPROL-XL  TAKE 1 TABLET EVERY DAY     nozaseptin nasal   Commonly known as:  NOZIN  1 each by Each Nare route 2 (two) times daily. Complete supply provided by hospital     ONE DAILY MULTIVITAMIN per tablet  Generic drug:  multivitamin     tadalafil 5 MG tablet  Commonly known as:  CIALIS  Take 1 tablet (5 mg total) by mouth daily as needed for Erectile Dysfunction.         * This list has 2  medication(s) that are the same as other medications prescribed for you. Read the directions carefully, and ask your doctor or other care provider to review them with you.            STOP taking these medications    tamsulosin 0.4 mg Cap  Commonly known as:  FLOMAX  Stopped by:  Megha George NP          Objective:     Vitals:    01/03/19 1415   BP: 122/71   Pulse: 71   Temp: 98 °F (36.7 °C)     Lab Results   Component Value Date    WBC 5.19 01/03/2019    HGB 11.9 (L) 01/03/2019    HCT 35.9 (L) 01/03/2019    MCV 85 01/03/2019     01/03/2019     CMP  Sodium   Date Value Ref Range Status   01/03/2019 143 136 - 145 mmol/L Final     Potassium   Date Value Ref Range Status   01/03/2019 4.6 3.5 - 5.1 mmol/L Final     Chloride   Date Value Ref Range Status   01/03/2019 106 95 - 110 mmol/L Final     CO2   Date Value Ref Range Status   01/03/2019 28 23 - 29 mmol/L Final     Glucose   Date Value Ref Range Status   01/03/2019 80 70 - 110 mg/dL Final     BUN, Bld   Date Value Ref Range Status   01/03/2019 19 8 - 23 mg/dL Final     Creatinine   Date Value Ref Range Status   01/03/2019 1.4 0.5 - 1.4 mg/dL Final     Calcium   Date Value Ref Range Status   01/03/2019 9.1 8.7 - 10.5 mg/dL Final     Total Protein   Date Value Ref Range Status   01/03/2019 6.7 6.0 - 8.4 g/dL Final     Albumin   Date Value Ref Range Status   01/03/2019 3.8 3.5 - 5.2 g/dL Final     Total Bilirubin   Date Value Ref Range Status   01/03/2019 0.3 0.1 - 1.0 mg/dL Final     Comment:     For infants and newborns, interpretation of results should be based  on gestational age, weight and in agreement with clinical  observations.  Premature Infant recommended reference ranges:  Up to 24 hours.............<8.0 mg/dL  Up to 48 hours............<12.0 mg/dL  3-5 days..................<15.0 mg/dL  6-29 days.................<15.0 mg/dL       Alkaline Phosphatase   Date Value Ref Range Status   01/03/2019 78 55 - 135 U/L Final     AST   Date Value Ref Range  Status   01/03/2019 21 10 - 40 U/L Final     ALT   Date Value Ref Range Status   01/03/2019 13 10 - 44 U/L Final     Anion Gap   Date Value Ref Range Status   01/03/2019 9 8 - 16 mmol/L Final     eGFR if    Date Value Ref Range Status   01/03/2019 56 (A) >60 mL/min/1.73 m^2 Final     eGFR if non    Date Value Ref Range Status   01/03/2019 48 (A) >60 mL/min/1.73 m^2 Final     Comment:     Calculation used to obtain the estimated glomerular filtration  rate (eGFR) is the CKD-EPI equation.        Lab Results   Component Value Date    IRON 100 11/13/2018    TIBC 329 11/13/2018    FERRITIN 28 11/13/2018         Physical Exam   Constitutional: He is oriented to person, place, and time. He appears well-developed and well-nourished. He is cooperative. No distress.   HENT:   Head: Normocephalic.   Right Ear: Hearing normal.   Left Ear: Hearing normal.   Nose: Nose normal.   Mouth/Throat: Oropharynx is clear and moist.   Eyes: Conjunctivae, EOM and lids are normal. Right eye exhibits no discharge. Left eye exhibits no discharge.   Neck: Normal range of motion. No thyroid mass present.   Cardiovascular: Normal rate, regular rhythm and normal heart sounds. Exam reveals no gallop and no friction rub.   No murmur heard.  Pulmonary/Chest: Effort normal and breath sounds normal. No respiratory distress. He has no wheezes. He has no rales. He exhibits no tenderness.   Abdominal: Soft. He exhibits no distension and no mass. There is no tenderness. There is no guarding.   Genitourinary:   Genitourinary Comments: deferred   Musculoskeletal: Normal range of motion. He exhibits no edema.   Patient walks with a cane.    Neurological: He is alert and oriented to person, place, and time.   Skin: Skin is warm, dry and intact.   Psychiatric: His speech is normal and behavior is normal. Thought content normal.   Vitals reviewed.       Assessment:     Problem List Items Addressed This Visit        Oncology     Chronic anemia - Primary     Hemoglobin remains around 11 range. He has been taking oral iron replacement therapy as previously prescribed by PCP. No iron deficiency noted during review of medical record, but patient iron levels are low normal.     His last colonoscopy was in 2015 with recommended follow up. Therefore I have placed orders for patient to have Upper and Lower Endoscopies to evaluate for possible blood loss etiology     Repeat anemia lab workup today. I did discuss with patient that is anemia workup continues to be noncontributory we will most likely proceed with BMB. He verbalizes understanding and agrees.         Relevant Orders    CBC auto differential    Comprehensive metabolic panel    Ferritin    Iron and TIBC    C-reactive protein (Completed)    Protein electrophoresis, serum    Immunoglobulin free LT chains blood    Immunofixation electrophoresis    Haptoglobin    Lactate dehydrogenase (Completed)    Vitamin B12    Folate    Homocysteine, serum    Methylmalonic acid, serum    Reticulocytes    Erythropoietin    Pathologist Interpretation Differential (Completed)    Alpha-Globin Gene Analysis    Hemoglobin Electrophoresis,Hgb A2 David.    Case request GI: EGD/Colonoscopy (Completed)      Other Visit Diagnoses     Nutritional anemia         Relevant Orders    Vitamin B12    Folate            Plan:     Chronic anemia  -     CBC auto differential; Future; Expected date: 01/03/2019  -     Comprehensive metabolic panel; Future; Expected date: 01/03/2019  -     Ferritin; Future; Expected date: 01/03/2019  -     Iron and TIBC; Future; Expected date: 01/03/2019  -     C-reactive protein; Future; Expected date: 01/03/2019  -     Protein electrophoresis, serum; Future; Expected date: 01/03/2019  -     Immunoglobulin free LT chains blood; Future; Expected date: 01/03/2019  -     Immunofixation electrophoresis; Future; Expected date: 01/03/2019  -     Haptoglobin; Future; Expected date: 01/03/2019  -      Lactate dehydrogenase; Future; Expected date: 01/03/2019  -     Vitamin B12; Future; Expected date: 01/03/2019  -     Folate; Future; Expected date: 01/03/2019  -     Homocysteine, serum; Future; Expected date: 01/03/2019  -     Methylmalonic acid, serum; Future; Expected date: 01/03/2019  -     Reticulocytes; Future; Expected date: 01/03/2019  -     Erythropoietin; Future; Expected date: 01/03/2019  -     Pathologist Interpretation Differential; Future; Expected date: 01/03/2019  -     Alpha-Globin Gene Analysis; Future; Expected date: 01/03/2019  -     Hemoglobin Electrophoresis,Hgb A2 David.; Future; Expected date: 01/03/2019  -     Case request GI: EGD/Colonoscopy    Nutritional anemia   -     Vitamin B12; Future; Expected date: 01/03/2019  -     Folate; Future; Expected date: 01/03/2019          I will review assessment/plan with collaborating physician     ZEINAB Yuen

## 2019-01-03 NOTE — ASSESSMENT & PLAN NOTE
Hemoglobin remains around 11 range. He has been taking oral iron replacement therapy as previously prescribed by PCP. No iron deficiency noted during review of medical record, but patient iron levels are low normal.     His last colonoscopy was in 2015 with recommended follow up. Therefore I have placed orders for patient to have Upper and Lower Endoscopies to evaluate for possible blood loss etiology     Repeat anemia lab workup today. I did discuss with patient that is anemia workup continues to be noncontributory we will most likely proceed with BMB. He verbalizes understanding and agrees.

## 2019-01-10 ENCOUNTER — OFFICE VISIT (OUTPATIENT)
Dept: PULMONOLOGY | Facility: CLINIC | Age: 79
End: 2019-01-10
Payer: MEDICARE

## 2019-01-10 VITALS
DIASTOLIC BLOOD PRESSURE: 70 MMHG | SYSTOLIC BLOOD PRESSURE: 132 MMHG | RESPIRATION RATE: 18 BRPM | HEART RATE: 69 BPM | OXYGEN SATURATION: 99 % | HEIGHT: 75 IN | BODY MASS INDEX: 26.9 KG/M2 | WEIGHT: 216.38 LBS

## 2019-01-10 DIAGNOSIS — G47.52 REM BEHAVIORAL DISORDER: Primary | Chronic | ICD-10-CM

## 2019-01-10 DIAGNOSIS — G47.50 PARASOMNIA: ICD-10-CM

## 2019-01-10 DIAGNOSIS — G47.33 OSA (OBSTRUCTIVE SLEEP APNEA): Chronic | ICD-10-CM

## 2019-01-10 DIAGNOSIS — G47.61 PLMD (PERIODIC LIMB MOVEMENT DISORDER): Chronic | ICD-10-CM

## 2019-01-10 PROCEDURE — 3078F DIAST BP <80 MM HG: CPT | Mod: CPTII,HCNC,S$GLB, | Performed by: INTERNAL MEDICINE

## 2019-01-10 PROCEDURE — 99999 PR PBB SHADOW E&M-EST. PATIENT-LVL III: ICD-10-PCS | Mod: PBBFAC,HCNC,, | Performed by: INTERNAL MEDICINE

## 2019-01-10 PROCEDURE — 1100F PR PT FALLS ASSESS DOC 2+ FALLS/FALL W/INJURY/YR: ICD-10-PCS | Mod: CPTII,HCNC,S$GLB, | Performed by: INTERNAL MEDICINE

## 2019-01-10 PROCEDURE — 3288F PR FALLS RISK ASSESSMENT DOCUMENTED: ICD-10-PCS | Mod: CPTII,HCNC,S$GLB, | Performed by: INTERNAL MEDICINE

## 2019-01-10 PROCEDURE — 1100F PTFALLS ASSESS-DOCD GE2>/YR: CPT | Mod: CPTII,HCNC,S$GLB, | Performed by: INTERNAL MEDICINE

## 2019-01-10 PROCEDURE — 99999 PR PBB SHADOW E&M-EST. PATIENT-LVL III: CPT | Mod: PBBFAC,HCNC,, | Performed by: INTERNAL MEDICINE

## 2019-01-10 PROCEDURE — 99214 OFFICE O/P EST MOD 30 MIN: CPT | Mod: HCNC,S$GLB,, | Performed by: INTERNAL MEDICINE

## 2019-01-10 PROCEDURE — 3075F PR MOST RECENT SYSTOLIC BLOOD PRESS GE 130-139MM HG: ICD-10-PCS | Mod: CPTII,HCNC,S$GLB, | Performed by: INTERNAL MEDICINE

## 2019-01-10 PROCEDURE — 3288F FALL RISK ASSESSMENT DOCD: CPT | Mod: CPTII,HCNC,S$GLB, | Performed by: INTERNAL MEDICINE

## 2019-01-10 PROCEDURE — 99214 PR OFFICE/OUTPT VISIT, EST, LEVL IV, 30-39 MIN: ICD-10-PCS | Mod: HCNC,S$GLB,, | Performed by: INTERNAL MEDICINE

## 2019-01-10 PROCEDURE — 3078F PR MOST RECENT DIASTOLIC BLOOD PRESSURE < 80 MM HG: ICD-10-PCS | Mod: CPTII,HCNC,S$GLB, | Performed by: INTERNAL MEDICINE

## 2019-01-10 PROCEDURE — 3075F SYST BP GE 130 - 139MM HG: CPT | Mod: CPTII,HCNC,S$GLB, | Performed by: INTERNAL MEDICINE

## 2019-01-10 RX ORDER — CLONAZEPAM 0.5 MG/1
0.5 TABLET, ORALLY DISINTEGRATING ORAL NIGHTLY
Qty: 90 TABLET | Refills: 0 | Status: SHIPPED | OUTPATIENT
Start: 2019-01-10 | End: 2019-05-29 | Stop reason: SDUPTHER

## 2019-01-10 NOTE — ASSESSMENT & PLAN NOTE
Continues safety precautions at home  Klonopin 0.5 mg QHS and melatonin Po  Very infrequent events  Stable on current regime  Sleeps with night on 65 davis  Suggestted soft light, 25 watt last visit

## 2019-01-10 NOTE — ASSESSMENT & PLAN NOTE
Denies snoring  [place] score 5  Talk in sleep  AHI was 6.1/hr ( 38 events)  Borderline CHARLES   Declines interventions in past and now     Discussed Morbidity of untreated CHARLES  Still reluctant to treat
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
## 2019-01-14 DIAGNOSIS — R52 PAIN: Primary | ICD-10-CM

## 2019-01-15 ENCOUNTER — TELEPHONE (OUTPATIENT)
Dept: ENDOSCOPY | Facility: HOSPITAL | Age: 79
End: 2019-01-15

## 2019-01-17 ENCOUNTER — OFFICE VISIT (OUTPATIENT)
Dept: PODIATRY | Facility: CLINIC | Age: 79
End: 2019-01-17
Payer: MEDICARE

## 2019-01-17 VITALS
SYSTOLIC BLOOD PRESSURE: 140 MMHG | BODY MASS INDEX: 27.05 KG/M2 | RESPIRATION RATE: 16 BRPM | DIASTOLIC BLOOD PRESSURE: 78 MMHG | HEART RATE: 67 BPM | HEIGHT: 75 IN

## 2019-01-17 DIAGNOSIS — M20.11 HALLUX VALGUS (ACQUIRED), RIGHT FOOT: ICD-10-CM

## 2019-01-17 DIAGNOSIS — I87.303 VENOUS HYPERTENSION OF LOWER EXTREMITY, BILATERAL: ICD-10-CM

## 2019-01-17 DIAGNOSIS — M20.12 HALLUX VALGUS (ACQUIRED), LEFT FOOT: ICD-10-CM

## 2019-01-17 DIAGNOSIS — M79.672 BILATERAL FOOT PAIN: ICD-10-CM

## 2019-01-17 DIAGNOSIS — I87.2 VENOUS INSUFFICIENCY (CHRONIC) (PERIPHERAL): ICD-10-CM

## 2019-01-17 DIAGNOSIS — M79.671 BILATERAL FOOT PAIN: ICD-10-CM

## 2019-01-17 DIAGNOSIS — E11.42 TYPE 2 DIABETES MELLITUS WITH DIABETIC POLYNEUROPATHY, WITHOUT LONG-TERM CURRENT USE OF INSULIN: Primary | ICD-10-CM

## 2019-01-17 PROCEDURE — 99213 OFFICE O/P EST LOW 20 MIN: CPT | Mod: HCNC,S$GLB,, | Performed by: PODIATRIST

## 2019-01-17 PROCEDURE — 99999 PR PBB SHADOW E&M-EST. PATIENT-LVL III: ICD-10-PCS | Mod: PBBFAC,HCNC,, | Performed by: PODIATRIST

## 2019-01-17 PROCEDURE — 3288F PR FALLS RISK ASSESSMENT DOCUMENTED: ICD-10-PCS | Mod: CPTII,HCNC,S$GLB, | Performed by: PODIATRIST

## 2019-01-17 PROCEDURE — 3288F FALL RISK ASSESSMENT DOCD: CPT | Mod: CPTII,HCNC,S$GLB, | Performed by: PODIATRIST

## 2019-01-17 PROCEDURE — 1100F PR PT FALLS ASSESS DOC 2+ FALLS/FALL W/INJURY/YR: ICD-10-PCS | Mod: CPTII,HCNC,S$GLB, | Performed by: PODIATRIST

## 2019-01-17 PROCEDURE — 99999 PR PBB SHADOW E&M-EST. PATIENT-LVL III: CPT | Mod: PBBFAC,HCNC,, | Performed by: PODIATRIST

## 2019-01-17 PROCEDURE — 3077F SYST BP >= 140 MM HG: CPT | Mod: CPTII,HCNC,S$GLB, | Performed by: PODIATRIST

## 2019-01-17 PROCEDURE — 99499 RISK ADDL DX/OHS AUDIT: ICD-10-PCS | Mod: HCNC,S$GLB,, | Performed by: PODIATRIST

## 2019-01-17 PROCEDURE — 99213 PR OFFICE/OUTPT VISIT, EST, LEVL III, 20-29 MIN: ICD-10-PCS | Mod: HCNC,S$GLB,, | Performed by: PODIATRIST

## 2019-01-17 PROCEDURE — 1100F PTFALLS ASSESS-DOCD GE2>/YR: CPT | Mod: CPTII,HCNC,S$GLB, | Performed by: PODIATRIST

## 2019-01-17 PROCEDURE — 99499 UNLISTED E&M SERVICE: CPT | Mod: HCNC,S$GLB,, | Performed by: PODIATRIST

## 2019-01-17 PROCEDURE — 3078F PR MOST RECENT DIASTOLIC BLOOD PRESSURE < 80 MM HG: ICD-10-PCS | Mod: CPTII,HCNC,S$GLB, | Performed by: PODIATRIST

## 2019-01-17 PROCEDURE — 3077F PR MOST RECENT SYSTOLIC BLOOD PRESSURE >= 140 MM HG: ICD-10-PCS | Mod: CPTII,HCNC,S$GLB, | Performed by: PODIATRIST

## 2019-01-17 PROCEDURE — 3078F DIAST BP <80 MM HG: CPT | Mod: CPTII,HCNC,S$GLB, | Performed by: PODIATRIST

## 2019-01-17 NOTE — PROGRESS NOTES
Subjective:       Patient ID: Srinath Mcknight is a 78 y.o. male.    Chief Complaint: Follow-up (1 month comperession stocking follow up, rates pain 4/10 in BLLE, described as a tightenigh sensation, wears Diabetic Shoes, ambulates iwth assistance of cane, Diabetic PT, PCP Dr. Wilder)    HPI: Srinath Mcknight presents to the office today, for follow up of B/L LE compression stockings. Patient was Rx compression stockings at his last appointment for the aforementioned. Patient states that he has a difficult time putting on and taking off the compress stockings. He states that when he does where the compression stockings his edema and resultant swelling and pains are markedly improved. Yeison Wilder MD is his primary care provider.  She presents this afternoon of stating persistent pains at the bilateral great toe joint, right greater than left.  He did have x-ray evaluation the last appointment which did show bilateral hallux abductovalgus deformity with osteoarthropathy preop.  He does take Celebrex as needed, but admits he seldom takes it.  States his pains are most pronounced with prolonged walking and standing. He does not take a diuretic for his lower extremity edema.    Hemoglobin A1C   Date Value Ref Range Status   10/30/2018 5.6 4.0 - 5.6 % Final     Comment:     ADA Screening Guidelines:  5.7-6.4%  Consistent with prediabetes  >or=6.5%  Consistent with diabetes  High levels of fetal hemoglobin interfere with the HbA1C  assay. Heterozygous hemoglobin variants (HbS, HgC, etc)do  not significantly interfere with this assay.   However, presence of multiple variants may affect accuracy.     09/11/2018 5.5 4.0 - 5.6 % Final     Comment:     ADA Screening Guidelines:  5.7-6.4%  Consistent with prediabetes  >or=6.5%  Consistent with diabetes  High levels of fetal hemoglobin interfere with the HbA1C  assay. Heterozygous hemoglobin variants (HbS, HgC, etc)do  not significantly interfere with this assay.   However,  presence of multiple variants may affect accuracy.     04/12/2018 5.6 4.0 - 5.6 % Final     Comment:     According to ADA guidelines, hemoglobin A1c <7.0% represents  optimal control in non-pregnant diabetic patients. Different  metrics may apply to specific patient populations.   Standards of Medical Care in Diabetes-2016.  For the purpose of screening for the presence of diabetes:  <5.7%     Consistent with the absence of diabetes  5.7-6.4%  Consistent with increasing risk for diabetes   (prediabetes)  >or=6.5%  Consistent with diabetes  Currently, no consensus exists for use of hemoglobin A1c  for diagnosis of diabetes for children.  This Hemoglobin A1c assay has significant interference with fetal   hemoglobin   (HbF). The results are invalid for patients with abnormal amounts of   HbF,   including those with known Hereditary Persistence   of Fetal Hemoglobin. Heterozygous hemoglobin variants (HbAS, HbAC,   HbAD, HbAE, HbA2) do not significantly interfere with this assay;   however, presence of multiple variants in a sample may impact the %   interference.         Review of patient's allergies indicates:   Allergen Reactions    Sulfa (sulfonamide antibiotics)      Can't recall from 1995       Past Medical History:   Diagnosis Date    Anemia due to unknown mechanism 11/10/2015    Angina pectoris 2014    not since    Arthritis     Back pain     Coronary artery disease     Encounter for blood transfusion     Gout 12/05/2017    right foot     Hyperlipemia     Hypertension     CHARLES (obstructive sleep apnea)     Spinal cord disease     Trouble in sleeping     Type 2 diabetes mellitus with diabetic polyneuropathy, without long-term current use of insulin     Uses hearing aid     bilat       Family History   Problem Relation Age of Onset    Hypertension Mother     Heart disease Mother     Diabetes Mother     Hypertension Father     Heart disease Father     Diabetes Father     Stroke Father      Diabetes Sister     Cancer Brother 50        pancreas    Drug abuse Brother     Prostate cancer Neg Hx     Macular degeneration Neg Hx     Retinal detachment Neg Hx     Strabismus Neg Hx     Glaucoma Neg Hx     Blindness Neg Hx     Amblyopia Neg Hx     Kidney disease Neg Hx     Mental illness Neg Hx     Mental retardation Neg Hx     COPD Neg Hx     Asthma Neg Hx        Social History     Socioeconomic History    Marital status:      Spouse name: Not on file    Number of children: 0    Years of education: Not on file    Highest education level: Not on file   Social Needs    Financial resource strain: Not on file    Food insecurity - worry: Not on file    Food insecurity - inability: Not on file    Transportation needs - medical: Not on file    Transportation needs - non-medical: Not on file   Occupational History    Occupation: retired     Comment: teacher   Tobacco Use    Smoking status: Never Smoker    Smokeless tobacco: Never Used   Substance and Sexual Activity    Alcohol use: Yes     Alcohol/week: 0.6 oz     Types: 1 Glasses of wine per week     Comment: rarely  No alcohol prior to surgery    Drug use: No    Sexual activity: Yes     Partners: Female     Birth control/protection: Condom   Other Topics Concern    Not on file   Social History Narrative    Single lives alone. No children. Retired but some part time work - 4 hours a day. 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 Caffeine intake - sugar free cola, Rare coffee intake. Still drives. Does have a Living Will . Has a nephew Jt Gayle, lives in [place]. Has a friend Karina Rossi, locally who could help him.        Past Surgical History:   Procedure Laterality Date    ARTHROPLASTY-HIP (ANTERIOR APPROACH) Left 10/9/2017    Performed by [facility]baro Alcocer Sr., [facility] at [facility] OR    BACK SURGERY      BURSECTOMY - ELBOW Left 10/27/2017    Per[facility] by [facility] at [facility] OR    Colonoscopy N/A 4/7/2015    [facility] at [facility]    HERNIA REPAIR Bilateral 04/18/2017    INJECTION-JOINT Left 5/18/2016    Performed by Errol [facility]era [facility] at [facility] PAIN MGT    JOINT REPLACEMENT Left 10/09/2017    L YAHIR Dr. Alcocer    LAMINECTOMY  07/22/2016    LAMINECTOMY-LUMBAR-DECOMPRESSION - L3/4 Laminectomy and Cyst Removal L4/5 Laminectomy No Implants SNS: SSEP/EMG Open carlos + Aaron C-arm 2 hours 1st case N/A 7/22/2016    Performed by Sin [facility] at [facility] OR 2ND FLR    left elbow  10/2017    procedure to remove gout    lumbar foraminotomy  03/2018    REPAIR-HERNIA-INGUINAL-BILATERAL-LAPAROSCOPIC w/ mesh Bilateral 4/18/2017    Performed by Chauncey Ellis Jr., [facility] at [facility] OR 2ND FLR    REVISION, TOTAL ARTHROPLASTY, HIP Left 9/11/2018    Performed by [facility] at [facility] OR    ROTATOR CUFF REPAIR Left 2006    SHOULDER ARTHROSCOPY W/ ROTATOR CUFF REPAIR Right 2009    Transforaminal Epidural Steroid Injection-Left L3-4 & INJECTION-FACET-Left L3-4 Facet Joint Injection & Cyst Aspiration Left 6/14/2016    Per[facility] at [facility] PAIN T       Review of Systems   Constitutional: Negative for chills, fatigue and fever.   HENT: Negative for hearing loss.    Eyes: Negative for photophobia and visual disturbance.   Respiratory: Negative for cough, chest tightness, shortness of breath and wheezing.    Cardiovascular: Positive for leg swelling. Negative for chest pain and palpitations.   Gastrointestinal: Negative for constipation, diarrhea, nausea and vomiting.   Endocrine: Negative for cold intolerance and heat intolerance.   Genitourinary: Negative for flank pain.   Musculoskeletal: Negative for neck pain and neck stiffness.   Neurological: Negative for light-headedness and headaches.   Psychiatric/Behavioral: Negative for sleep disturbance.          Objective:   BP (!) 140/78 (BP Location: Right arm, Patient Position: Sitting, BP Method: Medium (Automatic))   Pulse 67    "Resp 16   Ht 6' 3" (1.905 m)   BMI 27.05 kg/m²     X-Ray Foot Complete Bilateral  Narrative: EXAMINATION:  XR FOOT COMPLETE 3 VIEW BILATERAL    CLINICAL HISTORY:  Pain in right foot    TECHNIQUE:  AP, lateral, and oblique views of both feet were performed.    COMPARISON:  None    FINDINGS:  Mild multi articular degenerative change identified throughout both feet.  Dorsal and early plantar calcaneal spurs on the right with less prominent dorsal spur on the left.  No significant soft tissue swelling, calcification or foreign body.  Bilateral os peroneum is a. Prominent degenerative change 1st MTP joints, greater on the right.  Positioning minimally limits evaluation of the phalanges without grossly evident fracture or dislocation.  Impression: As above.  Follow-up as warranted.    Electronically signed by: Ismael Brown MD  Date:    12/17/2018  Time:    11:09         LOWER EXTREMITY PHYSICAL EXAMINATION  DERMATOLOGY: Skin is supple, dry and intact. No ecchymosis is noted. No hypertrophic skin formation. No erythema or cellulitis is noted.    ORTHOPEDIC: Manual Muscle Testing is 5/5 in all planes on the left and right, without pains, with and without resistance. No pains to palpation of the medial or lateral ankle ligaments. No discomfort to palpation of the posterior tibial tendon, peroneal tendon, Achilles tendon or the anterior ankle tendons. Pes planus foot type is noted. No pain upon range of motion of the midfoot or hindfoot joints. No crepitus upon range of motion and midfoot or hindfoot joints. No ankle pathology is noted. Bilateral lower extremity HAV, right greater than left. Discomfort to palpation of the right 1st MTPJ, moderate at this time. Mild crepitus is noted. Wide splay forefoot type.  Gait pattern is non-antalgic.     VASCULAR: Capillary refill time is less than 3s. Edema is 2+ pitting and moderate. The left dorsalis pedis pulse is 1/4 and the left posterior tibial pulse is 0/4. The right dorsalis " pedis is 1/4 and the right posterior tibial pulse is 0/4. Varicosities are noted to the bilateral lower extremity. Spider veins and telangiectasias are noted. Hair growth is decreased. Skin appearance is WNL. Proximal to distal warm to warm. Elevation palor is absent. Dependent rubor is absent.     NEUROLOGY: Proprioception is intact. Sensation to light touch is intact. Sensation to pin prick is decreased. Vibratory sensation is decreased to the left and right lower extremity. Examination with 5.07 Surveyor Kyra monofilament reveals that protective sensation is not intact to the left and right plantar surfaces of the foot and digits, as the patient has decreased sensation/detection at greater than 4 distinct points of contact.        Assessment:     1. Type 2 diabetes mellitus with diabetic polyneuropathy, without long-term current use of insulin    2. Bilateral foot pain    3. Hallux valgus (acquired), left foot    4. Hallux valgus (acquired), right foot    5. Venous insufficiency (chronic) (peripheral)    6. Venous hypertension of lower extremity, bilateral        Plan:     Type 2 diabetes mellitus with diabetic polyneuropathy, without long-term current use of insulin  Patient advised to follow up with Primary Care Physician for management of comorbid states.    (Diabetic) Foot counseling and education is provided at this visit. Patient is advised to wear socks and shoes at all times.  Do not walk barefoot, or with just socks, even when indoors.  Be sure to check and inspect the inside of the shoe before putting them on her feet.  Protect your feet at all times.  Walking shoes and/or athletic shoes are the best types of shoe gear. Do not wear vinyl or plastic type shoe gear, as they do not stretch and/or breathe.  Protect your feet from hot and/or cold. Elevate the extremities when sitting.  Do not wear excessively tight socks and/or shoe gear. Wiggle your toes for a few minutes throughout the day. Move your  ankles up and down, in and out, to help blood flow in your lower extremity.     Bilateral foot pain  Hallux valgus (acquired), left foot  Hallux valgus (acquired), right foot  Thorough discussion is had with the patient today, concerning the diagnosis, its etiology, and the treatment algorithm at present.  Did offer cortisone injection to the RLE, but patient denies at this time. Patient does take Celebrex as needed, but has not in quite some time. I do advise Celebrex as needed.    Rec. shoe gear with soft and accommodative foot bed and with a high toe box for alleviation of symptoms. Possible EE or EEE in width as well for alleviation of symptoms.    Did discuss proper and supportive shoe gear in detail and at length with the patient.  These are shoes with firm and robust arch support; medial counter.  Shoes which only bend at the metatarsophalangeal joint and which are rigid in the midfoot and hindfoot. Patient urged to purchase running type or cross training type shoes gear which are designed for pronation control.       Venous insufficiency (chronic) (peripheral)  Venous hypertension of lower extremity, bilateral  Please continue compression stockings. Recommend continue w/ limb elevation.  Consider initiation oral diuretic if no overt contraindication.  I encouraged the patient to follow-up and discuss with his/her PCP.         Future Appointments   Date Time Provider Department Center   1/21/2019 10:30 AM Megha George NP [facility] HEM ONC [facility]   1/23/2019 11:00 AM [facility] XR2 [facility] XRAY [facility]   1/23/2019 11:20 AM Nick Draper MD [facility] ORTHO [facility]   4/17/2019 10:30 AM Tyshawn Lester DPM ON [facility]   7/10/2019 10:20 AM Martin Machuca MD ON [facility]

## 2019-01-23 ENCOUNTER — OFFICE VISIT (OUTPATIENT)
Dept: ORTHOPEDICS | Facility: CLINIC | Age: 79
End: 2019-01-23
Payer: MEDICARE

## 2019-01-23 ENCOUNTER — HOSPITAL ENCOUNTER (OUTPATIENT)
Dept: RADIOLOGY | Facility: HOSPITAL | Age: 79
Discharge: HOME | End: 2019-01-23
Attending: ORTHOPAEDIC SURGERY
Payer: MEDICARE

## 2019-01-23 VITALS
SYSTOLIC BLOOD PRESSURE: 127 MMHG | BODY MASS INDEX: 26.86 KG/M2 | HEIGHT: 75 IN | DIASTOLIC BLOOD PRESSURE: 71 MMHG | WEIGHT: 216 LBS | HEART RATE: 71 BPM

## 2019-01-23 DIAGNOSIS — Z96.642 CHRONIC HIP PAIN AFTER TOTAL REPLACEMENT OF LEFT HIP JOINT: ICD-10-CM

## 2019-01-23 DIAGNOSIS — R29.898 WEAKNESS OF LEFT HIP: ICD-10-CM

## 2019-01-23 DIAGNOSIS — R52 PAIN: ICD-10-CM

## 2019-01-23 DIAGNOSIS — G89.29 CHRONIC HIP PAIN AFTER TOTAL REPLACEMENT OF LEFT HIP JOINT: ICD-10-CM

## 2019-01-23 DIAGNOSIS — M25.552 LEFT HIP PAIN: Primary | ICD-10-CM

## 2019-01-23 DIAGNOSIS — M25.552 CHRONIC HIP PAIN AFTER TOTAL REPLACEMENT OF LEFT HIP JOINT: ICD-10-CM

## 2019-01-23 PROCEDURE — 3078F DIAST BP <80 MM HG: CPT | Mod: CPTII,S$GLB,, | Performed by: ORTHOPAEDIC SURGERY

## 2019-01-23 PROCEDURE — 73502 X-RAY EXAM HIP UNI 2-3 VIEWS: CPT | Mod: 26,HCNC,LT, | Performed by: RADIOLOGY

## 2019-01-23 PROCEDURE — 3078F PR MOST RECENT DIASTOLIC BLOOD PRESSURE < 80 MM HG: ICD-10-PCS | Mod: CPTII,S$GLB,, | Performed by: ORTHOPAEDIC SURGERY

## 2019-01-23 PROCEDURE — 73502 X-RAY EXAM HIP UNI 2-3 VIEWS: CPT | Mod: TC,HCNC,LT

## 2019-01-23 PROCEDURE — 1100F PR PT FALLS ASSESS DOC 2+ FALLS/FALL W/INJURY/YR: ICD-10-PCS | Mod: CPTII,S$GLB,, | Performed by: ORTHOPAEDIC SURGERY

## 2019-01-23 PROCEDURE — 99213 OFFICE O/P EST LOW 20 MIN: CPT | Mod: S$GLB,,, | Performed by: ORTHOPAEDIC SURGERY

## 2019-01-23 PROCEDURE — 99999 PR PBB SHADOW E&M-EST. PATIENT-LVL IV: ICD-10-PCS | Mod: PBBFAC,,, | Performed by: ORTHOPAEDIC SURGERY

## 2019-01-23 PROCEDURE — 99999 PR PBB SHADOW E&M-EST. PATIENT-LVL IV: CPT | Mod: PBBFAC,,, | Performed by: ORTHOPAEDIC SURGERY

## 2019-01-23 PROCEDURE — 99213 PR OFFICE/OUTPT VISIT, EST, LEVL III, 20-29 MIN: ICD-10-PCS | Mod: S$GLB,,, | Performed by: ORTHOPAEDIC SURGERY

## 2019-01-23 PROCEDURE — 3074F PR MOST RECENT SYSTOLIC BLOOD PRESSURE < 130 MM HG: ICD-10-PCS | Mod: CPTII,S$GLB,, | Performed by: ORTHOPAEDIC SURGERY

## 2019-01-23 PROCEDURE — 3074F SYST BP LT 130 MM HG: CPT | Mod: CPTII,S$GLB,, | Performed by: ORTHOPAEDIC SURGERY

## 2019-01-23 PROCEDURE — 3288F PR FALLS RISK ASSESSMENT DOCUMENTED: ICD-10-PCS | Mod: CPTII,S$GLB,, | Performed by: ORTHOPAEDIC SURGERY

## 2019-01-23 PROCEDURE — 73502 XR HIP 2 VIEW LEFT: ICD-10-PCS | Mod: 26,HCNC,LT, | Performed by: RADIOLOGY

## 2019-01-23 PROCEDURE — 1100F PTFALLS ASSESS-DOCD GE2>/YR: CPT | Mod: CPTII,S$GLB,, | Performed by: ORTHOPAEDIC SURGERY

## 2019-01-23 PROCEDURE — 3288F FALL RISK ASSESSMENT DOCD: CPT | Mod: CPTII,S$GLB,, | Performed by: ORTHOPAEDIC SURGERY

## 2019-01-23 NOTE — PROGRESS NOTES
Subjective:     Patient ID: Srinath Mcknight is a 78 y.o. male.    Chief Complaint: Pain of the Left Hip    He is here for follow up of left hip. Underwent left hip YAHIR revision with Dr. Alcocer 9/11/18. Overall doing well. Pain rated 2/10. No fevers or chills. No wound drainage. No dislocation events since surgery. Improving.         Past Medical History:   Diagnosis Date    Anemia due to unknown mechanism 11/10/2015    Angina pectoris 2014    not since    Arthritis     Back pain     Coronary artery disease     Encounter for blood transfusion     Gout 12/05/2017    right foot     Hyperlipemia     Hypertension     CHARLES (obstructive sleep apnea)     Spinal cord disease     Trouble in sleeping     Type 2 diabetes mellitus with diabetic polyneuropathy, without long-term current use of insulin     Uses hearing aid     bilat     Past Surgical History:   Procedure Laterality Date    ARTHROPLASTY-HIP (ANTERIOR APPROACH) Left 10/9/2017    [facility] OR    BACK SURGERY      BURSECTOMY - ELBOW Left 10/27/2017    [facility] at [facility] OR    Colonoscopy N/A 4/7/2015    Performed [facility]id Romero [facility] at [facility] ENDO    HERNIA REPAIR Bilateral 04/18/2017    INJECTION-JOINT Left 5/18/2016    [facility] PAIN MGT    JOINT REPLACEMENT Left 10/09/2017    L YAHIR Dr. Alcocer    LAMINECTOMY  07/22/2016    LAMINECTOMY-LUMBAR-DECOMPRESSION - L3/4 Laminectomy and Cyst Removal L4/5 Laminectomy No Implants SNS: SSEP/EMG Open carlos + Aaron C-arm 2 hours 1st case N/A 7/22/2016    Per[facility] Sin Bacon [facility] OR 2ND FLR    left elbow  10/2017    procedure to remove gout    lumbar foraminotomy  03/2018    REPAIR-HERNIA-INGUINAL-BILATERAL-LAPAROSCOPIC w/ mesh Bilateral 4/18/2017    Performed by Chauncey Ellis Jr[facility] at [facility] OR 2ND FLR    REVISION, TOTAL ARTHROPLASTY, HIP Left 9/11/2018    Performed by Albaro Alcocer  MD Jo[facility] at [facility] OR    ROTATOR CUFF REPAIR Left 2006    SHOULDER ARTHROSCOPY W/ ROTATOR CUFF REPAIR Right 2009    Transforaminal Epidural Steroid Injection-Left L3-4 & INJECTION-FACET-Left L3-4 Facet Joint Injection & Cyst Aspiration Left 6/14/2016    [facility] by [facility] at [facility] PAIN MGT     Family History   Problem Relation Age of Onset    Hypertension Mother     Heart disease Mother     Diabetes Mother     Hypertension Father     Heart disease Father     Diabetes Father     Stroke Father     Diabetes Sister     Cancer Brother 50        pancreas    Drug abuse Brother     Prostate cancer Neg Hx     Macular degeneration Neg Hx     Retinal detachment Neg Hx     Strabismus Neg Hx     Glaucoma Neg Hx     Blindness Neg Hx     Amblyopia Neg Hx     Kidney disease Neg Hx     Mental illness Neg Hx     Mental retardation Neg Hx     COPD Neg Hx     Asthma Neg Hx      Social History     Socioeconomic History    Marital status:      Spouse name: Not on file    Number of children: 0    Years of education: Not on file    Highest education level: Not on file   Social Needs    Financial resource strain: Not on file    Food insecurity - worry: Not on file    Food insecurity - inability: Not on file    Transportation needs - medical: Not on file    Transportation needs - non-medical: Not on file   Occupational History    Occupation: retired     Comment: teacher   Tobacco Use    Smoking status: Never Smoker    Smokeless tobacco: Never Used   Substance and Sexual Activity    Alcohol use: Yes     Alcohol/week: 0.6 oz     Types: 1 Glasses of wine per week     Comment: rarely  No alcohol prior to surgery    Drug use: No    Sexual activity: Yes     Partners: Female     Birth control/protection: Condom   Other Topics Concern    Not on file   Social History Narrative    Single lives alone. No children. 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  Caffeine intake - sugar free cola, Rare coffee intake. Still drives. Does have a Living Will . Has a nephew Jt Gayle, lives in [place]. Has a friend Karina Rossi, locally who could help him.      Medication List with Changes/Refills   New Medications    METOPROLOL SUCCINATE (TOPROL-XL) 50 MG 24 HR TABLET    TAKE 1 TABLET EVERY DAY   Current Medications    ASPIRIN (ECOTRIN) 81 MG EC TABLET    Take 1 tablet (81 mg total) by mouth once daily.    BLOOD-GLUCOSE METER (ACCU-CHEK FRANKO PLUS METER) MISC    1 Device by Misc.(Non-Drug; Combo Route) route once daily.    CELECOXIB (CELEBREX) 200 MG CAPSULE    TAKE 1 CAPSULE(200 MG) BY MOUTH EVERY DAY    CLONAZEPAM (KLONOPIN ORAL)    Take 0.25 mg by mouth every evening. Per [facility]    CLONAZEPAM (KLONOPIN) 0.5 MG DISINTEGRATING TABLET    Take 1 tablet (0.5 mg total) by mouth nightly.    DOCUSATE SODIUM (COLACE) 100 MG CAPSULE    Take 1 capsule (100 mg total) by mouth 2 (two) times daily as needed.    FERROUS SULFATE 324 MG (65 MG IRON) TBEC    Take 1 tablet (324 mg total) by mouth once daily.    GABAPENTIN (NEURONTIN) 300 MG CAPSULE    Take 300 mg by mouth 2 (two) times daily.    GARLIC EXTRACT ORAL    Take 1 tablet by mouth once daily. Per [facility]    GLIPIZIDE (GLUCOTROL) 5 MG TABLET    TAKE 2 TABLETS (10 MG) TWO TIMES DAILY BEFORE MEALS    LANCETS (ACCU-CHEK SOFTCLIX LANCETS) MISC    1 lancet by Misc.(Non-Drug; Combo Route) route once daily.    LOSARTAN-HYDROCHLOROTHIAZIDE 50-12.5 MG (HYZAAR) 50-12.5 MG PER TABLET    TAKE 1 TABLET EVERY DAY    METFORMIN (GLUCOPHAGE) 1000 MG TABLET    TAKE 1 TABLET TWICE DAILY WITH MEALS    MULTIVITAMIN (ONE DAILY MULTIVITAMIN) PER TABLET    Take 1 tablet by mouth once daily.    NOZASEPTIN (NOZIN) NASAL     1 each by Each Nare route 2 (two) times daily. Complete supply provided by [facility]    TADALAFIL (CIALIS) 5 MG TABLET    Take 1 tablet (5 mg total) by mouth daily as needed for Erectile Dysfunction.    Changed and/or Refilled Medications    Modified Medication Previous Medication    ACCU-CHEK FRANKO PLUS TEST STRP STRP blood sugar diagnostic (ACCU-CHEK FRANKO PLUS TEST STRP) Strp       USE  1  STRIP ONE TIME DAILY    USE  1  STRIP ONE TIME DAILY    ALLOPURINOL (ZYLOPRIM) 100 MG TABLET allopurinol (ZYLOPRIM) 100 MG tablet       TAKE 1 TABLET EVERY DAY    Take 1 tablet (100 mg total) by mouth once daily.    PRAVASTATIN (PRAVACHOL) 40 MG TABLET pravastatin (PRAVACHOL) 40 MG tablet       TAKE 1 TABLET EVERY DAY    TAKE 1 TABLET EVERY DAY   Discontinued Medications    METOPROLOL SUCCINATE (TOPROL-XL) 50 MG 24 HR TABLET    TAKE 1 TABLET EVERY DAY     Review of patient's allergies indicates:   Allergen Reactions    Sulfa (sulfonamide antibiotics)      Can't recall from 1995     Review of Systems   Constitution: Negative for fever.   HENT: Negative for sore throat.    Eyes: Negative for blurred vision.   Cardiovascular: Negative for dyspnea on exertion.   Respiratory: Negative for shortness of breath.    Hematologic/Lymphatic: Does not bruise/bleed easily.   Skin: Negative for itching.   Gastrointestinal: Negative for vomiting.   Genitourinary: Negative for dysuria.   Neurological: Negative for dizziness.   Psychiatric/Behavioral: The patient does not have insomnia.        Objective:   Body mass index is 27 kg/m².  Vitals:    01/23/19 1102   BP: 127/71   Pulse: 71           General    Nursing note and vitals reviewed.  Constitutional: He is oriented to person, place, and time. He appears well-developed. No distress.   HENT:   Head: Normocephalic and atraumatic.   Eyes: EOM are normal.   Cardiovascular: Normal rate.    Pulmonary/Chest: Effort normal. No stridor.   Neurological: He is alert and oriented to person, place, and time.   Psychiatric: He has a normal mood and affect. His behavior is normal.     General Musculoskeletal Exam   Gait: Trendelenburg     Left Hip Exam     Inspection   Scars: present  Swelling:  absent  No deformity of hip.    Tenderness   The patient tender to palpation of the trochanteric bursa.    Range of Motion   Flexion: 90     Tests   Pain w/ forced internal rotation (GELY): absent  Pain w/ forced external rotation (FADIR): absent  Log Roll: negative    Other   Sensation: normal          Muscle Strength   Left Lower Extremity   Left hip abductors strength: 4+/5.     Vascular Exam       Capillary Refill  Left Hand: normal capillary refill      IMAGING Radiographs / Imaging : Results reviewed by me and interpreted by me, discussed with the patient and / or family   AP view of the pelvis and frog leg lateral view of the left hip were performed.    COMPARISON:  October 23, 2018, September 25, 2018    FINDINGS:  Postsurgical changes left total hip arthroplasty.  Stable appearance and positioning of the femoral and acetabular components.  No acute fracture or dislocation.  Prominent heterotopic calcifications along the superior soft tissues of the left hip.  Prominent degenerative change noted in the included lumbosacral spine, bilateral SI and right hip joints.  No acute fracture or dislocation.   Impression       Stable exam.  No acute fracture or dislocation.      Electronically signed by: Ismael Brown MD  Date: 01/23/2019  Time: 11:06         Assessment:     Encounter Diagnoses   Name Primary?   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 Wilder  -xrays show stable prosthesis no apparent complications   -he will follow up in 4 months for recheck with new xrays left hip

## 2019-01-29 ENCOUNTER — OFFICE VISIT (OUTPATIENT)
Dept: HEMATOLOGY/ONCOLOGY | Facility: CLINIC | Age: 79
End: 2019-01-29
Payer: MEDICARE

## 2019-01-29 VITALS
OXYGEN SATURATION: 98 % | DIASTOLIC BLOOD PRESSURE: 67 MMHG | HEART RATE: 76 BPM | TEMPERATURE: 98 F | BODY MASS INDEX: 27.61 KG/M2 | WEIGHT: 220.88 LBS | SYSTOLIC BLOOD PRESSURE: 119 MMHG

## 2019-01-29 DIAGNOSIS — D64.9 CHRONIC ANEMIA: Primary | ICD-10-CM

## 2019-01-29 PROCEDURE — 99499 UNLISTED E&M SERVICE: CPT | Mod: HCNC,S$GLB,, | Performed by: NURSE PRACTITIONER

## 2019-01-29 PROCEDURE — 99214 PR OFFICE/OUTPT VISIT, EST, LEVL IV, 30-39 MIN: ICD-10-PCS | Mod: HCNC,S$GLB,, | Performed by: NURSE PRACTITIONER

## 2019-01-29 PROCEDURE — 3074F SYST BP LT 130 MM HG: CPT | Mod: HCNC,CPTII,S$GLB, | Performed by: NURSE PRACTITIONER

## 2019-01-29 PROCEDURE — 3078F DIAST BP <80 MM HG: CPT | Mod: HCNC,CPTII,S$GLB, | Performed by: NURSE PRACTITIONER

## 2019-01-29 PROCEDURE — 99499 RISK ADDL DX/OHS AUDIT: ICD-10-PCS | Mod: HCNC,S$GLB,, | Performed by: NURSE PRACTITIONER

## 2019-01-29 PROCEDURE — 1101F PR PT FALLS ASSESS DOC 0-1 FALLS W/OUT INJ PAST YR: ICD-10-PCS | Mod: HCNC,CPTII,S$GLB, | Performed by: NURSE PRACTITIONER

## 2019-01-29 PROCEDURE — 99999 PR PBB SHADOW E&M-EST. PATIENT-LVL III: CPT | Mod: PBBFAC,HCNC,, | Performed by: NURSE PRACTITIONER

## 2019-01-29 PROCEDURE — 99999 PR PBB SHADOW E&M-EST. PATIENT-LVL III: ICD-10-PCS | Mod: PBBFAC,HCNC,, | Performed by: NURSE PRACTITIONER

## 2019-01-29 PROCEDURE — 3074F PR MOST RECENT SYSTOLIC BLOOD PRESSURE < 130 MM HG: ICD-10-PCS | Mod: HCNC,CPTII,S$GLB, | Performed by: NURSE PRACTITIONER

## 2019-01-29 PROCEDURE — 99214 OFFICE O/P EST MOD 30 MIN: CPT | Mod: HCNC,S$GLB,, | Performed by: NURSE PRACTITIONER

## 2019-01-29 PROCEDURE — 1101F PT FALLS ASSESS-DOCD LE1/YR: CPT | Mod: HCNC,CPTII,S$GLB, | Performed by: NURSE PRACTITIONER

## 2019-01-29 PROCEDURE — 3078F PR MOST RECENT DIASTOLIC BLOOD PRESSURE < 80 MM HG: ICD-10-PCS | Mod: HCNC,CPTII,S$GLB, | Performed by: NURSE PRACTITIONER

## 2019-01-29 NOTE — PROGRESS NOTES
Subjective:       Patient ID: Srinath Mcknight is a 78 y.o. male.    Chief Complaint: F/U anemia    HPI:  78 y.o. Male with HTN, OA, chronic anemia, DM, CAD, HLD who presents to the Heme-Onc clinic for follow up of his anemia of unknown origin. The patient has had workup for chronic anemia of unknown origin. The laboratory studies are unremarkable. The patient has two negative stool occult blood testing on file. The patient continues to take daily iron pills. He tells me he was instructed to take iron pills by his PCP but has no knowledge of iron deficiency history.  Upon review of the medical record the patient has not had documented iron deficiency but iron levels are low normal. Patient does have sickle cell trait. He tells me this was already known to him.     Today's visit: Hemoglobin remains stable. CMP stable. Patient is without complaints except chronic arthritic pain. I did have a detailed discussion with patient regarding the various etiologies associated with his anemia of chronic disease. He understands that only a monitoring approach is warranted at present time and depending on remaining stability of CBC may require further intervention in the future.      Social History     Socioeconomic History    Marital status:      Spouse name: Not on file    Number of children: 0    Years of education: Not on file    Highest education level: Not on file   Social Needs    Financial resource strain: Not on file    Food insecurity - worry: Not on file    Food insecurity - inability: Not on file    Transportation needs - medical: Not on file    Transportation needs - non-medical: Not on file   Occupational History    Occupation: retired     Comment: teacher   Tobacco Use    Smoking status: Never Smoker    Smokeless tobacco: Never Used   Substance and Sexual Activity    Alcohol use: Yes     Alcohol/week: 0.6 oz     Types: 1 Glasses of wine per week     Comment: rarely  No alcohol prior to surgery     Drug use: No    Sexual activity: Yes     Partners: Female     Birth control/protection: Condom   Other Topics Concern    Not on file   Social History Narrative    Single lives alone. No children. Retired but some part time work - 4 hours a day. 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 Caffeine intake - sugar free cola, Rare coffee intake. Still drives. Does have a Living Will . Has a nephew Jt Gayle, lives in [place]. Has a friend Karina Rossi, locally who could help him.        Past Medical History:   Diagnosis Date    Anemia due to unknown mechanism 11/10/2015    Angina pectoris 2014    not since    Arthritis     Back pain     Coronary artery disease     Encounter for blood transfusion     Gout 12/05/2017    right foot     Hyperlipemia     Hypertension     CHARLES (obstructive sleep apnea)     Spinal cord disease     Trouble in sleeping     Type 2 diabetes mellitus with diabetic polyneuropathy, without long-term current use of insulin     Uses hearing aid     bilat       Family History   Problem Relation Age of Onset    Hypertension Mother     Heart disease Mother     Diabetes Mother     Hypertension Father     Heart disease Father     Diabetes Father     Stroke Father     Diabetes Sister     Cancer Brother 50        pancreas    Drug abuse Brother     Prostate cancer Neg Hx     Macular degeneration Neg Hx     Retinal detachment Neg Hx     Strabismus Neg Hx     Glaucoma Neg Hx     Blindness Neg Hx     Amblyopia Neg Hx     Kidney disease Neg Hx     Mental illness Neg Hx     Mental retardation Neg Hx     COPD Neg Hx     Asthma Neg Hx        Past Surgical History:   Procedure Laterality Date    ARTHROPLASTY-HIP (ANTERIOR APPROACH) Left 10/9/2017    [facility] Sr[facility] OR    BACK SURGERY      BURSECTOMY - ELBOW Left 10/27/2017    [facility] at [facility] OR    Colonoscopy N/A 4/7/2015    Performed [facility] Vahid Romero [facility] at [facility] ENDO     HERNIA REPAIR Bilateral 04/18/2017    INJECTION-JOINT Left 5/18/2016    [facility] PAIN MGT    JOINT REPLACEMENT Left 10/09/2017    L YAHIR Dr. Alcocer    LAMINECTOMY  07/22/2016    LAMINECTOMY-LUMBAR-DECOMPRESSION - L3/4 Laminectomy and Cyst Removal L4/5 Laminectomy No Implants SNS: SSEP/EMG Open carlos + Aaron C-arm 2 hours 1st case N/A 7/22/2016    Performed by Sin Bacon [facility] OR 2ND FLR    left elbow  10/2017    procedure to remove gout    lumbar foraminotomy  03/2018    REPAIR-HERNIA-INGUINAL-BILATERAL-LAPAROSCOPIC w/ mesh Bilateral 4/18/2017    Performed by Chauncey Ellis Jr., [facility] at [facility] OR 2ND FLR    REVISION, TOTAL ARTHROPLASTY, HIP Left 9/11/2018    [facility] OR    ROTATOR CUFF REPAIR Left 2006    SHOULDER ARTHROSCOPY W/ ROTATOR CUFF REPAIR Right 2009    Transforaminal Epidural Steroid Injection-Left L3-4 & INJECTION-FACET-Left L3-4 Facet Joint Injection & Cyst Aspiration Left 6/14/2016    Performed by Desmond Hutchinson [facility] at [facility] PAIN MGT       Review of Systems   Constitutional: Negative for activity change, appetite change, chills, diaphoresis, fatigue, fever and unexpected weight change.   HENT: Negative for nosebleeds, sore throat, trouble swallowing and voice change.    Eyes: Negative for visual disturbance.   Respiratory: Negative for cough, chest tightness, shortness of breath and wheezing.    Cardiovascular: Negative for chest pain, palpitations and leg swelling.   Gastrointestinal: Negative for abdominal distention, abdominal pain, anal bleeding, blood in stool, constipation, diarrhea, nausea and vomiting.   Genitourinary: Negative for difficulty urinating, dysuria and hematuria.   Musculoskeletal: Positive for arthralgias and gait problem. Negative for myalgias.        Patient walks with a cane   Skin: Negative for rash and wound.   Neurological: Positive for weakness. Negative for dizziness,  light-headedness and headaches.   Hematological: Does not bruise/bleed easily.   Psychiatric/Behavioral: The patient is nervous/anxious.             Medication List           Accurate as of 1/29/19  5:09 PM. If you have any questions, ask your nurse or doctor.               CHANGE how you take these medications    docusate sodium 100 MG capsule  Commonly known as:  COLACE  Take 1 capsule (100 mg total) by mouth 2 (two) times daily as needed.  What changed:    · when to take this  · additional instructions     pravastatin 40 MG tablet  Commonly known as:  PRAVACHOL  TAKE 1 TABLET EVERY DAY  What changed:    · how much to take  · how to take this  · when to take this        CONTINUE taking these medications    allopurinol 100 MG tablet  Commonly known as:  ZYLOPRIM  Take 1 tablet (100 mg total) by mouth once daily.     aspirin 81 MG EC tablet  Commonly known as:  ECOTRIN  Take 1 tablet (81 mg total) by mouth once daily.     blood sugar diagnostic Strp  Commonly known as:  ACCU-CHEK FRANKO PLUS TEST STRP  USE  1  STRIP ONE TIME DAILY     blood-glucose meter Misc  Commonly known as:  ACCU-CHEK FRANKO PLUS METER  1 Device by Misc.(Non-Drug; Combo Route) route once daily.     celecoxib 200 MG capsule  Commonly known as:  CeleBREX  TAKE 1 CAPSULE(200 MG) BY MOUTH EVERY DAY     ferrous sulfate 324 mg (65 mg iron) Tbec  Take 1 tablet (324 mg total) by mouth once daily.     gabapentin 300 MG capsule  Commonly known as:  NEURONTIN     GARLIC EXTRACT ORAL     glipiZIDE 5 MG tablet  Commonly known as:  GLUCOTROL  TAKE 2 TABLETS (10 MG) TWO TIMES DAILY BEFORE MEALS     * KLONOPIN ORAL     * clonazePAM 0.5 MG disintegrating tablet  Commonly known as:  KLONOPIN  Take 1 tablet (0.5 mg total) by mouth nightly.     lancets Misc  Commonly known as:  ACCU-CHEK SOFTCLIX LANCETS  1 lancet by Misc.(Non-Drug; Combo Route) route once daily.     losartan-hydrochlorothiazide 50-12.5 mg 50-12.5 mg per tablet  Commonly known as:  HYZAAR  TAKE 1  TABLET EVERY DAY     metFORMIN 1000 MG tablet  Commonly known as:  GLUCOPHAGE  TAKE 1 TABLET TWICE DAILY WITH MEALS     metoprolol succinate 50 MG 24 hr tablet  Commonly known as:  TOPROL-XL  TAKE 1 TABLET EVERY DAY     nozaseptin nasal   Commonly known as:  NOZIN  1 each by Each Nare route 2 (two) times daily. Complete supply provided by hospital     ONE DAILY MULTIVITAMIN per tablet  Generic drug:  multivitamin     tadalafil 5 MG tablet  Commonly known as:  CIALIS  Take 1 tablet (5 mg total) by mouth daily as needed for Erectile Dysfunction.         * This list has 2 medication(s) that are the same as other medications prescribed for you. Read the directions carefully, and ask your doctor or other care provider to review them with you.              Objective:     Vitals:    01/29/19 1630   BP: 119/67   Pulse: 76   Temp: 97.7 °F (36.5 °C)     Lab Results   Component Value Date    WBC 5.19 01/03/2019    HGB 11.9 (L) 01/03/2019    HCT 35.9 (L) 01/03/2019    MCV 85 01/03/2019     01/03/2019     CMP  Sodium   Date Value Ref Range Status   01/03/2019 143 136 - 145 mmol/L Final     Potassium   Date Value Ref Range Status   01/03/2019 4.6 3.5 - 5.1 mmol/L Final     Chloride   Date Value Ref Range Status   01/03/2019 106 95 - 110 mmol/L Final     CO2   Date Value Ref Range Status   01/03/2019 28 23 - 29 mmol/L Final     Glucose   Date Value Ref Range Status   01/03/2019 80 70 - 110 mg/dL Final     BUN, Bld   Date Value Ref Range Status   01/03/2019 19 8 - 23 mg/dL Final     Creatinine   Date Value Ref Range Status   01/03/2019 1.4 0.5 - 1.4 mg/dL Final     Calcium   Date Value Ref Range Status   01/03/2019 9.1 8.7 - 10.5 mg/dL Final     Total Protein   Date Value Ref Range Status   01/03/2019 6.7 6.0 - 8.4 g/dL Final     Albumin   Date Value Ref Range Status   01/03/2019 3.8 3.5 - 5.2 g/dL Final     Total Bilirubin   Date Value Ref Range Status   01/03/2019 0.3 0.1 - 1.0 mg/dL Final     Comment:     For  infants and newborns, interpretation of results should be based  on gestational age, weight and in agreement with clinical  observations.  Premature Infant recommended reference ranges:  Up to 24 hours.............<8.0 mg/dL  Up to 48 hours............<12.0 mg/dL  3-5 days..................<15.0 mg/dL  6-29 days.................<15.0 mg/dL       Alkaline Phosphatase   Date Value Ref Range Status   01/03/2019 78 55 - 135 U/L Final     AST   Date Value Ref Range Status   01/03/2019 21 10 - 40 U/L Final     ALT   Date Value Ref Range Status   01/03/2019 13 10 - 44 U/L Final     Anion Gap   Date Value Ref Range Status   01/03/2019 9 8 - 16 mmol/L Final     eGFR if    Date Value Ref Range Status   01/03/2019 56 (A) >60 mL/min/1.73 m^2 Final     eGFR if non    Date Value Ref Range Status   01/03/2019 48 (A) >60 mL/min/1.73 m^2 Final     Comment:     Calculation used to obtain the estimated glomerular filtration  rate (eGFR) is the CKD-EPI equation.        Lab Results   Component Value Date    IRON 88 01/03/2019    TIBC 349 01/03/2019    FERRITIN 23 01/03/2019         Physical Exam   Constitutional: He is oriented to person, place, and time. He appears well-developed and well-nourished. He is cooperative. No distress.   HENT:   Head: Normocephalic.   Right Ear: Hearing normal.   Left Ear: Hearing normal.   Nose: Nose normal.   Mouth/Throat: Oropharynx is clear and moist.   Eyes: Conjunctivae, EOM and lids are normal. Right eye exhibits no discharge. Left eye exhibits no discharge.   Neck: Normal range of motion. No thyroid mass present.   Cardiovascular: Normal rate, regular rhythm and normal heart sounds. Exam reveals no gallop and no friction rub.   No murmur heard.  Pulmonary/Chest: Effort normal and breath sounds normal. No respiratory distress. He has no wheezes. He has no rales. He exhibits no tenderness.   Abdominal: Soft. He exhibits no distension and no mass. There is no tenderness.  There is no guarding.   Genitourinary:   Genitourinary Comments: deferred   Musculoskeletal: Normal range of motion. He exhibits no edema.   Patient walks with a cane.    Neurological: He is alert and oriented to person, place, and time.   Skin: Skin is warm, dry and intact.   Psychiatric: His speech is normal and behavior is normal. Thought content normal.   Vitals reviewed.       Assessment:     Problem List Items Addressed This Visit        Oncology    Chronic anemia - Primary     Multifactorial etiology of anemia. Patient does have element of kidney disease. Also has sickle cell trait. Patient also with significant arthritis. Hemoglobin has remained stable. He continues to take ferrous sulfate daily. Will plan continued monitoring approach for now. No documented iron deficiency so will contact GI office to determine if EGD warranted. Patient is overdue for Colonoscopy. Will have nurse send GI office communication to have this scheduled    F/u 4 months with labs prior. I did discuss with patient if progression of anemia or decline in CBC will discuss BMBx in the future         Relevant Orders    CBC auto differential    Comprehensive metabolic panel    Iron and TIBC    Ferritin    C-reactive protein            Plan:     Chronic anemia  -     CBC auto differential; Future; Expected date: 01/29/2019  -     Comprehensive metabolic panel; Future; Expected date: 01/29/2019  -     Iron and TIBC; Future; Expected date: 01/29/2019  -     Ferritin; Future; Expected date: 01/29/2019  -     C-reactive protein; Future; Expected date: 01/29/2019          I will review assessment/plan with collaborating physician     ZEINAB Yuen

## 2019-01-29 NOTE — ASSESSMENT & PLAN NOTE
Multifactorial etiology of anemia. Patient does have element of kidney disease. Also has sickle cell trait. Patient also with significant arthritis. Hemoglobin has remained stable. He continues to take ferrous sulfate daily. Will plan continued monitoring approach for now. No documented iron deficiency so will contact GI office to determine if EGD warranted. Patient is overdue for Colonoscopy. Will have nurse send GI office communication to have this scheduled    F/u 4 months with labs prior. I did discuss with patient if progression of anemia or decline in CBC will discuss BMBx in the future

## 2019-02-15 ENCOUNTER — TELEPHONE (OUTPATIENT)
Dept: ENDOSCOPY | Facility: HOSPITAL | Age: 79
End: 2019-02-15

## 2019-02-15 NOTE — TELEPHONE ENCOUNTER
----- Message from Tejal Mims LPN sent at 1/29/2019  5:15 PM CST -----  Regarding: Needs Colonscopy  Please call pt to schedule.  Thank you

## 2019-02-15 NOTE — TELEPHONE ENCOUNTER
Left patient a message to call our office in regards to endoscopy procedures, call back number provided.

## 2019-02-22 RX ORDER — PRAVASTATIN SODIUM 40 MG/1
TABLET ORAL
Qty: 90 TABLET | Refills: 0 | Status: SHIPPED | OUTPATIENT
Start: 2019-02-22 | End: 2019-05-17 | Stop reason: SDUPTHER

## 2019-02-26 RX ORDER — ALLOPURINOL 100 MG/1
TABLET ORAL
Qty: 90 TABLET | Refills: 1 | Status: SHIPPED | OUTPATIENT
Start: 2019-02-26 | End: 2019-11-22 | Stop reason: SDUPTHER

## 2019-02-26 RX ORDER — BLOOD SUGAR DIAGNOSTIC
STRIP MISCELLANEOUS
Qty: 100 STRIP | Refills: 1 | Status: SHIPPED | OUTPATIENT
Start: 2019-02-26 | End: 2019-10-10 | Stop reason: SDUPTHER

## 2019-02-26 RX ORDER — METOPROLOL SUCCINATE 50 MG/1
TABLET, EXTENDED RELEASE ORAL
Qty: 90 TABLET | Refills: 3 | Status: SHIPPED | OUTPATIENT
Start: 2019-02-26 | End: 2020-02-13

## 2019-03-25 DIAGNOSIS — M25.552 LEFT HIP PAIN: Primary | ICD-10-CM

## 2019-04-17 ENCOUNTER — OFFICE VISIT (OUTPATIENT)
Dept: PODIATRY | Facility: CLINIC | Age: 79
End: 2019-04-17
Payer: MEDICARE

## 2019-04-17 VITALS
BODY MASS INDEX: 27.61 KG/M2 | DIASTOLIC BLOOD PRESSURE: 67 MMHG | HEIGHT: 75 IN | SYSTOLIC BLOOD PRESSURE: 131 MMHG | HEART RATE: 68 BPM

## 2019-04-17 DIAGNOSIS — M79.674 PAIN OF RIGHT GREAT TOE: ICD-10-CM

## 2019-04-17 DIAGNOSIS — M20.12 VALGUS DEFORMITY OF BOTH GREAT TOES: ICD-10-CM

## 2019-04-17 DIAGNOSIS — M20.11 VALGUS DEFORMITY OF BOTH GREAT TOES: ICD-10-CM

## 2019-04-17 DIAGNOSIS — B35.1 ONYCHOMYCOSIS: ICD-10-CM

## 2019-04-17 DIAGNOSIS — E11.51 TYPE 2 DIABETES MELLITUS WITH DIABETIC PERIPHERAL ANGIOPATHY WITHOUT GANGRENE, WITHOUT LONG-TERM CURRENT USE OF INSULIN: ICD-10-CM

## 2019-04-17 DIAGNOSIS — M20.5X2 HALLUX LIMITUS, LEFT: ICD-10-CM

## 2019-04-17 DIAGNOSIS — E11.42 TYPE 2 DIABETES MELLITUS WITH DIABETIC POLYNEUROPATHY, WITHOUT LONG-TERM CURRENT USE OF INSULIN: Primary | ICD-10-CM

## 2019-04-17 DIAGNOSIS — M79.675 PAIN OF LEFT GREAT TOE: ICD-10-CM

## 2019-04-17 DIAGNOSIS — M20.5X1 HALLUX LIMITUS, RIGHT: ICD-10-CM

## 2019-04-17 PROCEDURE — 99213 OFFICE O/P EST LOW 20 MIN: CPT | Mod: 25,HCNC,S$GLB, | Performed by: PODIATRIST

## 2019-04-17 PROCEDURE — 11720 DEBRIDE NAIL 1-5: CPT | Mod: 59,Q8,HCNC,S$GLB | Performed by: PODIATRIST

## 2019-04-17 PROCEDURE — 1101F PT FALLS ASSESS-DOCD LE1/YR: CPT | Mod: HCNC,CPTII,S$GLB, | Performed by: PODIATRIST

## 2019-04-17 PROCEDURE — 99999 PR PBB SHADOW E&M-EST. PATIENT-LVL III: CPT | Mod: PBBFAC,HCNC,, | Performed by: PODIATRIST

## 2019-04-17 PROCEDURE — 99499 RISK ADDL DX/OHS AUDIT: ICD-10-PCS | Mod: HCNC,S$GLB,, | Performed by: PODIATRIST

## 2019-04-17 PROCEDURE — 99213 PR OFFICE/OUTPT VISIT, EST, LEVL III, 20-29 MIN: ICD-10-PCS | Mod: 25,HCNC,S$GLB, | Performed by: PODIATRIST

## 2019-04-17 PROCEDURE — 1101F PR PT FALLS ASSESS DOC 0-1 FALLS W/OUT INJ PAST YR: ICD-10-PCS | Mod: HCNC,CPTII,S$GLB, | Performed by: PODIATRIST

## 2019-04-17 PROCEDURE — 11719 TRIM NAIL(S) ANY NUMBER: CPT | Mod: Q8,HCNC,S$GLB, | Performed by: PODIATRIST

## 2019-04-17 PROCEDURE — 3075F PR MOST RECENT SYSTOLIC BLOOD PRESS GE 130-139MM HG: ICD-10-PCS | Mod: HCNC,CPTII,S$GLB, | Performed by: PODIATRIST

## 2019-04-17 PROCEDURE — 11720 PR DEBRIDEMENT OF NAIL(S), 1-5: ICD-10-PCS | Mod: 59,Q8,HCNC,S$GLB | Performed by: PODIATRIST

## 2019-04-17 PROCEDURE — 3075F SYST BP GE 130 - 139MM HG: CPT | Mod: HCNC,CPTII,S$GLB, | Performed by: PODIATRIST

## 2019-04-17 PROCEDURE — 3078F PR MOST RECENT DIASTOLIC BLOOD PRESSURE < 80 MM HG: ICD-10-PCS | Mod: HCNC,CPTII,S$GLB, | Performed by: PODIATRIST

## 2019-04-17 PROCEDURE — 99499 UNLISTED E&M SERVICE: CPT | Mod: HCNC,S$GLB,, | Performed by: PODIATRIST

## 2019-04-17 PROCEDURE — 99999 PR PBB SHADOW E&M-EST. PATIENT-LVL III: ICD-10-PCS | Mod: PBBFAC,HCNC,, | Performed by: PODIATRIST

## 2019-04-17 PROCEDURE — 3078F DIAST BP <80 MM HG: CPT | Mod: HCNC,CPTII,S$GLB, | Performed by: PODIATRIST

## 2019-04-17 PROCEDURE — 11719 PR TRIM NAIL(S): ICD-10-PCS | Mod: Q8,HCNC,S$GLB, | Performed by: PODIATRIST

## 2019-04-17 NOTE — PROGRESS NOTES
Subjective:       Patient ID: Srinath Mcknight is a 78 y.o. male.    Chief Complaint: Nail Problem (c/o bilateral mid-foot pain. rates pain 4/10. wears tennis shoes with socks. diabetic Pt. PCP Dr. Wilder.)      HPI: Patient presents to the office with the chief complaint of elongated, thickened and dystrophic nail plates to the B/L foot. This patient is a Diabetic Type II, complicated with Peripheral Neuropathy and PVD. Patient does follow with Primary Care and/or Endocrinology for management of Diabetes Mellitus. This patient's PMD is Yeison Wilder MD. This patient last saw his/her primary care provider, several weeks ago.     Hemoglobin A1C   Date Value Ref Range Status   10/30/2018 5.6 4.0 - 5.6 % Final     Comment:     ADA Screening Guidelines:  5.7-6.4%  Consistent with prediabetes  >or=6.5%  Consistent with diabetes  High levels of fetal hemoglobin interfere with the HbA1C  assay. Heterozygous hemoglobin variants (HbS, HgC, etc)do  not significantly interfere with this assay.   However, presence of multiple variants may affect accuracy.     09/11/2018 5.5 4.0 - 5.6 % Final     Comment:     ADA Screening Guidelines:  5.7-6.4%  Consistent with prediabetes  >or=6.5%  Consistent with diabetes  High levels of fetal hemoglobin interfere with the HbA1C  assay. Heterozygous hemoglobin variants (HbS, HgC, etc)do  not significantly interfere with this assay.   However, presence of multiple variants may affect accuracy.     04/12/2018 5.6 4.0 - 5.6 % Final     Comment:     According to ADA guidelines, hemoglobin A1c <7.0% represents  optimal control in non-pregnant diabetic patients. Different  metrics may apply to specific patient populations.   Standards of Medical Care in Diabetes-2016.  For the purpose of screening for the presence of diabetes:  <5.7%     Consistent with the absence of diabetes  5.7-6.4%  Consistent with increasing risk for diabetes   (prediabetes)  >or=6.5%  Consistent with diabetes  Currently,  no consensus exists for use of hemoglobin A1c  for diagnosis of diabetes for children.  This Hemoglobin A1c assay has significant interference with fetal   hemoglobin   (HbF). The results are invalid for patients with abnormal amounts of   HbF,   including those with known Hereditary Persistence   of Fetal Hemoglobin. Heterozygous hemoglobin variants (HbAS, HbAC,   HbAD, HbAE, HbA2) do not significantly interfere with this assay;   however, presence of multiple variants in a sample may impact the %   interference.     .    Review of patient's allergies indicates:   Allergen Reactions    Sulfa (sulfonamide antibiotics)      Can't recall from 1995       Past Medical History:   Diagnosis Date    Anemia due to unknown mechanism 11/10/2015    Angina pectoris 2014    not since    Arthritis     Back pain     Coronary artery disease     Encounter for blood transfusion     Gout 12/05/2017    right foot     Hyperlipemia     Hypertension     CHARLES (obstructive sleep apnea)     Spinal cord disease     Trouble in sleeping     Type 2 diabetes mellitus with diabetic polyneuropathy, without long-term current use of insulin     Uses hearing aid     bilat       Family History   Problem Relation Age of Onset    Hypertension Mother     Heart disease Mother     Diabetes Mother     Hypertension Father     Heart disease Father     Diabetes Father     Stroke Father     Diabetes Sister     Cancer Brother 50        pancreas    Drug abuse Brother     Prostate cancer Neg Hx     Macular degeneration Neg Hx     Retinal detachment Neg Hx     Strabismus Neg Hx     Glaucoma Neg Hx     Blindness Neg Hx     Amblyopia Neg Hx     Kidney disease Neg Hx     Mental illness Neg Hx     Mental retardation Neg Hx     COPD Neg Hx     Asthma Neg Hx        Social History     Socioeconomic History    Marital status:      Spouse name: Not on file    Number of children: 0    Years of education: Not on file    Highest  education level: Not on file   Occupational History    Occupation: retired     Comment: teacher   Social Needs    Financial resource strain: Not on file    Food insecurity:     Worry: Not on file     Inability: Not on file    Transportation needs:     Medical: Not on file     Non-medical: Not on file   Tobacco Use    Smoking status: Never Smoker    Smokeless tobacco: Never Used   Substance and Sexual Activity    Alcohol use: Yes     Alcohol/week: 0.6 oz     Types: 1 Glasses of wine per week     Comment: rarely  No alcohol prior to surgery    Drug use: No    Sexual activity: Yes     Partners: Female     Birth control/protection: Condom   Lifestyle    Physical activity:     Days per week: Not on file     Minutes per session: Not on file    Stress: Not on file   Relationships    Social connections:     Talks on phone: Not on file     Gets together: Not on file     Attends Druze service: Not on file     Active member of club or organization: Not on file     Attends meetings of clubs or organizations: Not on file     Relationship status: Not on file   Other Topics Concern    Not on file   Social History Narrative    Single lives alone. No children. Retired but some part time work - 4 hours a day. 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 Caffeine intake - sugar free cola, Rare coffee intake. Still drives. Does have a Living Will . Has a nephew Jt Gayle, lives in [place]. Has a friend Karina Rossi, locally who could help him.        Past Surgical History:   Procedure Laterality Date    ARTHROPLASTY-HIP (ANTERIOR APPROACH) Left 10/9/2017    Performed by Albaro Alcocer Sr., [facility] at [facility] OR    BACK SURGERY      BURSECTOMY - ELBOW Left 10/27/2017    Performed by [facility]o [facility]cocer Sr[facility] at [facility] OR    Colonoscopy N/A 4/7/2015    Performed by Vahid Romero MD at [facility] ENDO    HERNIA REPAIR Bilateral 04/18/2017    INJECTION-JOINT Left 5/18/2016    Performed [facility]rol [facility]era [facility] at [facility] PAIN T     "JOINT REPLACEMENT Left 10/09/2017    L YAHIR Dr. Alcocer    LAMINECTOMY  07/22/2016    LAMINECTOMY-LUMBAR-DECOMPRESSION - L3/4 Laminectomy and Cyst Removal L4/5 Laminectomy No Implants SNS: SSEP/EMG Open carlos + Aaron C-arm 2 hours 1st case N/A 7/22/2016    Performed by Sin Bacon [facility] at [facility] OR 2ND FLR    left elbow  10/2017    procedure to remove gout    lumbar foraminotomy  03/2018    REPAIR-HERNIA-INGUINAL-BILATERAL-LAPAROSCOPIC w/ mesh Bilateral 4/18/2017    Performed by Chauncey Ellis Jr., MD at [facility] OR 2ND FLR    REVISION, TOTAL ARTHROPLASTY, HIP Left 9/11/2018    [facility] OR    ROTATOR CUFF REPAIR Left 2006    SHOULDER ARTHROSCOPY W/ ROTATOR CUFF REPAIR Right 2009    Transforaminal Epidural Steroid Injection-Left L3-4 & INJECTION-FACET-Left L3-4 Facet Joint Injection & Cyst Aspiration Left 6/14/2016    Performed by Desmond Hutchinson MD at [facility] PAIN MGT       Review of Systems   Constitutional: Negative for chills, fatigue and fever.   HENT: Negative for hearing loss.    Eyes: Negative for photophobia and visual disturbance.   Respiratory: Negative for cough, chest tightness, shortness of breath and wheezing.    Cardiovascular: Positive for leg swelling. Negative for chest pain and palpitations.   Gastrointestinal: Negative for constipation, diarrhea, nausea and vomiting.   Endocrine: Negative for cold intolerance and heat intolerance.   Genitourinary: Negative for flank pain.   Musculoskeletal: Positive for arthralgias, gait problem, joint swelling and myalgias. Negative for back pain, neck pain and neck stiffness.   Skin: Negative for wound.   Neurological: Positive for numbness. Negative for light-headedness and headaches.   Psychiatric/Behavioral: Negative for sleep disturbance.          Objective:   /67 (BP Location: Left arm, Patient Position: Sitting, BP Method: Large (Automatic))   Pulse 68   Ht 6' 3" (1.905 m)   BMI 27.61 kg/m²     LOWER " EXTREMITY PHYSICAL EXAMINATION  VASCULAR: Capillary refill time is less than 3s. The left dorsalis pedis pulse is 1/4 and the left posterior tibial pulse is 0/4. The right dorsalis pedis is 1/4 and the right posterior tibial pulse is 0/4. Spider veins and telangiectasias are noted. Hair growth is decreased. Edema is 2+ pitting and moderate.    NEUROLOGY: Protective sensation is not intact to the left and right plantar surfaces of the foot and digits, as the patient has no sensation/detection at greater than 4 distinct points of contact with 5.07 [place] monofilament. Sensation to light touch is intact on the left and right foot. Proprioception is intact, bilateral. Sensation to pin prick is reduced to absent. Vibratory sensation is diminished    DERMATOLOGY: On the left foot, nails 1 are suggestive of onychomycotic changes. On the right foot, nails 1 are suggestive of onychomycotic changes. These nail plates are thickened, are dystrophic, chaulky in appearance and malodorous with substantial subungual debris. These nail plates are yellow to brown in appearance. The remaining nail plates are elongated and do not have suggestive clinical features of onychomycosis.     ORTHOPEDIC: Manual Muscle Testing is 5/5 in all planes on the left and right, without pains, with and without resistance.  Patient states discomfort to bilateral great toe metatarsophalangeal joint, mostly dorsally.  Upon attempted range of motion, there is mild-to-moderate discomfort stated.  There is joint jamming.  Palpation of dorsal lateral aspect of the joint is painful.  HAV deformity is noted. Flexible hammertoe contractures are noted.  Equinus contractures appreciated.  Gait pattern is slow, but progressive.  Gait pattern is non-antalgic.      Assessment:     1. Type 2 diabetes mellitus with diabetic polyneuropathy, without long-term current use of insulin    2. Type 2 diabetes mellitus with diabetic peripheral angiopathy without  gangrene, without long-term current use of insulin    3. Onychomycosis    4. Hallux limitus, unspecified laterality    5. Valgus deformity of both great toes    6. Pain of right great toe    7. Pain of left great toe        Plan:     Type 2 diabetes mellitus with diabetic polyneuropathy, without long-term current use of insulin  Type 2 diabetes mellitus with diabetic peripheral angiopathy without gangrene, without long-term current use of insulin   I counseled the patient on his/her Diabetic Mellitus regarding today's clinical examination and objection findings. We did also discuss recent medication changes, pertinent labs and imaging evaluations and other medical consultation notes and progress notes. Greater than 50% of this visit was spent on counseling and coordination of care. Greater than 20 minutes of this appt. was spent on education about the diabetic foot, in relation to PVD and/or neuropathy, and the prevention of limb loss.     Shoe gear is inspected and wear and proper fit/type. Patient is reminded of the importance of good nutrition and blood sugar control to help prevent podiatric complications of diabetes. Patient instructed on proper foot hygeine. We discussed wearing proper shoe gear, daily foot inspections, never walking without protective shoe gear, never putting sharp instruments to feet.  Patient  will continue to monitor the areas daily, inspect feet, wear protective shoe gear when ambulatory, moisturizer to maintain skin integrity.     Patient's DMII is managed by Yeison Wilder MD and/or Endocrinology Advanced Practice Provider. As per the most recent glycohemoglobin level, this patient is at goal.    Onychomycosis  The onychomycotic nail plates, as outlined above, are sharply debrided with double action nail nipper, and/or with the assistance of a mechanical rotary gamaliel, with removal of all offending nail and nail border(s), for reduction of pains. Nails are reduced in terms of length,  width and girth with removal of subungual debris to facilitate pain free weight bearing and ambulation. The elongated nails as outlined in the objective portion of this note, were trimmed to appropriate length, with a double action nail nipper, for alleviation/reduction of pains as well. Follow up in approx. 3-4 months.    Hallux limitus, left  Hallux limitus, right  Valgus deformity of both great toes  Pain of right great toe  Pain of left great toe  Thorough discussion is had with the patient today, concerning the diagnosis, its etiology, and the treatment algorithm at present.    Did discuss proper and supportive shoe gear in detail and at length with the patient.  These are shoes with firm and robust arch support; medial counter.  Shoes which only bend at the metatarsophalangeal joint and which are rigid in the midfoot and hindfoot. Patient urged to purchase running type or cross training type shoes gear which are designed for pronation control.              Future Appointments   Date Time Provider Department Center   5/20/2019  9:45 AM LABORATORY, [facility]   5/20/2019 10:30 AM Megha George NP [facility] HEM ONC [facility]   5/22/2019  2:00 PM Alfred Gomez MD [facility] ORTHO [facility]   5/30/2019 10:00 AM [facility] XR2 [facility] XRAY [facility]   5/30/2019 10:30 AM Divine Hunt PA-C [facility] ORTHO [facility]   7/10/2019 10:20 AM Martin Machuca MD [facility]   8/21/2019 10:45 AM Tyshawn Lester DPM [facility] POD [facility]

## 2019-05-15 ENCOUNTER — OFFICE VISIT (OUTPATIENT)
Dept: OPHTHALMOLOGY | Facility: CLINIC | Age: 79
End: 2019-05-15
Payer: MEDICARE

## 2019-05-15 DIAGNOSIS — E11.9 DIABETES MELLITUS TYPE 2 WITHOUT RETINOPATHY: Primary | ICD-10-CM

## 2019-05-15 DIAGNOSIS — I10 ESSENTIAL HYPERTENSION: Chronic | ICD-10-CM

## 2019-05-15 DIAGNOSIS — H52.4 BILATERAL PRESBYOPIA: ICD-10-CM

## 2019-05-15 DIAGNOSIS — H43.813 PVD (POSTERIOR VITREOUS DETACHMENT), BILATERAL: ICD-10-CM

## 2019-05-15 PROCEDURE — 92015 DETERMINE REFRACTIVE STATE: CPT | Mod: HCNC,S$GLB,, | Performed by: OPTOMETRIST

## 2019-05-15 PROCEDURE — 92015 PR REFRACTION: ICD-10-PCS | Mod: HCNC,S$GLB,, | Performed by: OPTOMETRIST

## 2019-05-15 PROCEDURE — 99999 PR PBB SHADOW E&M-EST. PATIENT-LVL I: ICD-10-PCS | Mod: PBBFAC,HCNC,, | Performed by: OPTOMETRIST

## 2019-05-15 PROCEDURE — 92014 COMPRE OPH EXAM EST PT 1/>: CPT | Mod: HCNC,S$GLB,, | Performed by: OPTOMETRIST

## 2019-05-15 PROCEDURE — 99999 PR PBB SHADOW E&M-EST. PATIENT-LVL I: CPT | Mod: PBBFAC,HCNC,, | Performed by: OPTOMETRIST

## 2019-05-15 PROCEDURE — 92014 PR EYE EXAM, EST PATIENT,COMPREHESV: ICD-10-PCS | Mod: HCNC,S$GLB,, | Performed by: OPTOMETRIST

## 2019-05-15 NOTE — PROGRESS NOTES
HPI     NIDDM exam.   Eyes seems to tighten up a lot.  Decrease near visual acuity without glasses.  Last eye exam 01/23/2018 TRF.   Update glasses RX.    Last edited by Olimpia Ponce on 5/15/2019  9:22 AM. (History)            Assessment /Plan     For exam results, see Encounter Report.    Diabetes mellitus type 2 without retinopathy    Essential hypertension    PVD (posterior vitreous detachment), bilateral    Bilateral presbyopia      No Background Diabetic Retinopathy    No HTN Retinopathy    Stable PVD    Dispense Final Rx for glasses.  RTC 1 year  Discussed above and answered questions.

## 2019-05-20 ENCOUNTER — OFFICE VISIT (OUTPATIENT)
Dept: HEMATOLOGY/ONCOLOGY | Facility: CLINIC | Age: 79
End: 2019-05-20
Payer: MEDICARE

## 2019-05-20 ENCOUNTER — LAB VISIT (OUTPATIENT)
Dept: LAB | Facility: HOSPITAL | Age: 79
End: 2019-05-20
Attending: NURSE PRACTITIONER
Payer: MEDICARE

## 2019-05-20 VITALS
SYSTOLIC BLOOD PRESSURE: 142 MMHG | WEIGHT: 235.88 LBS | OXYGEN SATURATION: 98 % | RESPIRATION RATE: 18 BRPM | BODY MASS INDEX: 29.33 KG/M2 | HEIGHT: 75 IN | DIASTOLIC BLOOD PRESSURE: 75 MMHG | HEART RATE: 72 BPM | TEMPERATURE: 97 F

## 2019-05-20 DIAGNOSIS — E78.2 COMBINED HYPERLIPIDEMIA ASSOCIATED WITH TYPE 2 DIABETES MELLITUS: Chronic | ICD-10-CM

## 2019-05-20 DIAGNOSIS — E11.69 COMBINED HYPERLIPIDEMIA ASSOCIATED WITH TYPE 2 DIABETES MELLITUS: Chronic | ICD-10-CM

## 2019-05-20 DIAGNOSIS — D64.9 CHRONIC ANEMIA: ICD-10-CM

## 2019-05-20 DIAGNOSIS — D64.9 ANEMIA DUE TO UNKNOWN MECHANISM: Primary | ICD-10-CM

## 2019-05-20 DIAGNOSIS — I10 ESSENTIAL HYPERTENSION: Chronic | ICD-10-CM

## 2019-05-20 LAB
ALBUMIN SERPL BCP-MCNC: 3.8 G/DL (ref 3.5–5.2)
ALP SERPL-CCNC: 71 U/L (ref 55–135)
ALT SERPL W/O P-5'-P-CCNC: 21 U/L (ref 10–44)
ANION GAP SERPL CALC-SCNC: 11 MMOL/L (ref 8–16)
AST SERPL-CCNC: 24 U/L (ref 10–40)
BASOPHILS # BLD AUTO: 0.03 K/UL (ref 0–0.2)
BASOPHILS NFR BLD: 0.6 % (ref 0–1.9)
BILIRUB SERPL-MCNC: 0.3 MG/DL (ref 0.1–1)
BUN SERPL-MCNC: 20 MG/DL (ref 8–23)
CALCIUM SERPL-MCNC: 9.2 MG/DL (ref 8.7–10.5)
CHLORIDE SERPL-SCNC: 110 MMOL/L (ref 95–110)
CO2 SERPL-SCNC: 25 MMOL/L (ref 23–29)
CREAT SERPL-MCNC: 1.7 MG/DL (ref 0.5–1.4)
CRP SERPL-MCNC: 1.8 MG/L (ref 0–8.2)
DIFFERENTIAL METHOD: ABNORMAL
EOSINOPHIL # BLD AUTO: 0.4 K/UL (ref 0–0.5)
EOSINOPHIL NFR BLD: 7.1 % (ref 0–8)
ERYTHROCYTE [DISTWIDTH] IN BLOOD BY AUTOMATED COUNT: 14.9 % (ref 11.5–14.5)
EST. GFR  (AFRICAN AMERICAN): 44 ML/MIN/1.73 M^2
EST. GFR  (NON AFRICAN AMERICAN): 38 ML/MIN/1.73 M^2
FERRITIN SERPL-MCNC: 33 NG/ML (ref 20–300)
GLUCOSE SERPL-MCNC: 166 MG/DL (ref 70–110)
HCT VFR BLD AUTO: 36.3 % (ref 40–54)
HGB BLD-MCNC: 11.6 G/DL (ref 14–18)
IMM GRANULOCYTES # BLD AUTO: 0.02 K/UL (ref 0–0.04)
IMM GRANULOCYTES NFR BLD AUTO: 0.4 % (ref 0–0.5)
IRON SERPL-MCNC: 84 UG/DL (ref 45–160)
LYMPHOCYTES # BLD AUTO: 1.9 K/UL (ref 1–4.8)
LYMPHOCYTES NFR BLD: 38.3 % (ref 18–48)
MCH RBC QN AUTO: 28 PG (ref 27–31)
MCHC RBC AUTO-ENTMCNC: 32 G/DL (ref 32–36)
MCV RBC AUTO: 88 FL (ref 82–98)
MONOCYTES # BLD AUTO: 0.6 K/UL (ref 0.3–1)
MONOCYTES NFR BLD: 11.7 % (ref 4–15)
NEUTROPHILS # BLD AUTO: 2.1 K/UL (ref 1.8–7.7)
NEUTROPHILS NFR BLD: 42.3 % (ref 38–73)
NRBC BLD-RTO: 0 /100 WBC
PLATELET # BLD AUTO: 203 K/UL (ref 150–350)
PMV BLD AUTO: 10.1 FL (ref 9.2–12.9)
POTASSIUM SERPL-SCNC: 4.2 MMOL/L (ref 3.5–5.1)
PROT SERPL-MCNC: 6.9 G/DL (ref 6–8.4)
RBC # BLD AUTO: 4.15 M/UL (ref 4.6–6.2)
SATURATED IRON: 25 % (ref 20–50)
SODIUM SERPL-SCNC: 146 MMOL/L (ref 136–145)
TOTAL IRON BINDING CAPACITY: 332 UG/DL (ref 250–450)
TRANSFERRIN SERPL-MCNC: 224 MG/DL (ref 200–375)
WBC # BLD AUTO: 4.96 K/UL (ref 3.9–12.7)

## 2019-05-20 PROCEDURE — 1101F PT FALLS ASSESS-DOCD LE1/YR: CPT | Mod: HCNC,CPTII,S$GLB, | Performed by: NURSE PRACTITIONER

## 2019-05-20 PROCEDURE — 99999 PR PBB SHADOW E&M-EST. PATIENT-LVL III: ICD-10-PCS | Mod: PBBFAC,HCNC,, | Performed by: NURSE PRACTITIONER

## 2019-05-20 PROCEDURE — 82728 ASSAY OF FERRITIN: CPT | Mod: HCNC

## 2019-05-20 PROCEDURE — 99999 PR PBB SHADOW E&M-EST. PATIENT-LVL III: CPT | Mod: PBBFAC,HCNC,, | Performed by: NURSE PRACTITIONER

## 2019-05-20 PROCEDURE — 1101F PR PT FALLS ASSESS DOC 0-1 FALLS W/OUT INJ PAST YR: ICD-10-PCS | Mod: HCNC,CPTII,S$GLB, | Performed by: NURSE PRACTITIONER

## 2019-05-20 PROCEDURE — 99213 OFFICE O/P EST LOW 20 MIN: CPT | Mod: HCNC,S$GLB,, | Performed by: NURSE PRACTITIONER

## 2019-05-20 PROCEDURE — 99499 UNLISTED E&M SERVICE: CPT | Mod: HCNC,S$GLB,, | Performed by: NURSE PRACTITIONER

## 2019-05-20 PROCEDURE — 85025 COMPLETE CBC W/AUTO DIFF WBC: CPT | Mod: HCNC

## 2019-05-20 PROCEDURE — 3077F SYST BP >= 140 MM HG: CPT | Mod: HCNC,CPTII,S$GLB, | Performed by: NURSE PRACTITIONER

## 2019-05-20 PROCEDURE — 99213 PR OFFICE/OUTPT VISIT, EST, LEVL III, 20-29 MIN: ICD-10-PCS | Mod: HCNC,S$GLB,, | Performed by: NURSE PRACTITIONER

## 2019-05-20 PROCEDURE — 99499 RISK ADDL DX/OHS AUDIT: ICD-10-PCS | Mod: HCNC,S$GLB,, | Performed by: NURSE PRACTITIONER

## 2019-05-20 PROCEDURE — 3078F PR MOST RECENT DIASTOLIC BLOOD PRESSURE < 80 MM HG: ICD-10-PCS | Mod: HCNC,CPTII,S$GLB, | Performed by: NURSE PRACTITIONER

## 2019-05-20 PROCEDURE — 3078F DIAST BP <80 MM HG: CPT | Mod: HCNC,CPTII,S$GLB, | Performed by: NURSE PRACTITIONER

## 2019-05-20 PROCEDURE — 81269 HBA1/HBA2 GENE DUP/DEL VRNTS: CPT | Mod: HCNC

## 2019-05-20 PROCEDURE — 36415 COLL VENOUS BLD VENIPUNCTURE: CPT | Mod: HCNC

## 2019-05-20 PROCEDURE — 83540 ASSAY OF IRON: CPT | Mod: HCNC

## 2019-05-20 PROCEDURE — 80053 COMPREHEN METABOLIC PANEL: CPT | Mod: HCNC

## 2019-05-20 PROCEDURE — 3077F PR MOST RECENT SYSTOLIC BLOOD PRESSURE >= 140 MM HG: ICD-10-PCS | Mod: HCNC,CPTII,S$GLB, | Performed by: NURSE PRACTITIONER

## 2019-05-20 PROCEDURE — 86140 C-REACTIVE PROTEIN: CPT | Mod: HCNC

## 2019-05-20 RX ORDER — CLONAZEPAM 0.5 MG/1
TABLET, ORALLY DISINTEGRATING ORAL
Refills: 0 | COMMUNITY
Start: 2019-04-01 | End: 2019-06-05 | Stop reason: SDUPTHER

## 2019-05-20 RX ORDER — PRAVASTATIN SODIUM 40 MG/1
TABLET ORAL
Qty: 90 TABLET | Refills: 0 | Status: SHIPPED | OUTPATIENT
Start: 2019-05-20 | End: 2019-08-08 | Stop reason: SDUPTHER

## 2019-05-20 NOTE — PROGRESS NOTES
Subjective:       Patient ID: Srinath Mcknight is a 78 y.o. male.    Chief Complaint: F/U anemia    HPI:  78 y.o. Male with HTN, OA, chronic anemia, DM, CAD, HLD who presents to the Heme-Onc clinic for follow up of his anemia of unknown origin. The patient has had workup for chronic anemia of unknown origin. The laboratory studies are unremarkable. The patient has two negative stool occult blood testing on file. The patient continues to take daily iron pills. He tells me he was instructed to take iron pills by his PCP but has no knowledge of iron deficiency history.  Upon review of the medical record the patient has not had documented iron deficiency but iron levels are low normal. Patient does have sickle cell trait. He tells me this was already known to him.     Today's visit: Hemoglobin remains stable. CMP stable. Patient is without complaints except chronic arthritic pain. I did have a detailed discussion with patient regarding the various etiologies associated with his anemia of chronic disease. He understands that only a monitoring approach is warranted at present time and depending on remaining stability of CBC may require further intervention in the future. Orders previously placed for upper and lower endoscopies. Patient has not yet had endoscopies scheduled. Unsure as to why.      Social History     Socioeconomic History    Marital status:      Spouse name: Not on file    Number of children: 0    Years of education: Not on file    Highest education level: Not on file   Occupational History    Occupation: retired     Comment: teacher   Social Needs    Financial resource strain: Not on file    Food insecurity:     Worry: Not on file     Inability: Not on file    Transportation needs:     Medical: Not on file     Non-medical: Not on file   Tobacco Use    Smoking status: Never Smoker    Smokeless tobacco: Never Used   Substance and Sexual Activity    Alcohol use: Yes     Alcohol/week: 0.6 oz      Types: 1 Glasses of wine per week     Comment: rarely  No alcohol prior to surgery    Drug use: No    Sexual activity: Yes     Partners: Female     Birth control/protection: Condom   Lifestyle    Physical activity:     Days per week: Not on file     Minutes per session: Not on file    Stress: Not on file   Relationships    Social connections:     Talks on phone: Not on file     Gets together: Not on file     Attends [employer] service: Not on file     Active member of club or organization: Not on file     Attends meetings of clubs or organizations: Not on file     Relationship status: Not on file   Other Topics Concern    Not on file   Social History Narrative    Single lives alone. No children. Retired but some part time work - 4 hours a day. 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 Caffeine intake - sugar free cola, Rare coffee intake. Still drives. Does have a Living Will . Has a nephew Jt Gayle, lives in [place]. Has a friend Karina Rossi, locally who could help him.        Past Medical History:   Diagnosis Date    Anemia due to unknown mechanism 11/10/2015    Angina pectoris 2014    not since    Arthritis     Back pain     Coronary artery disease     DM (diabetes mellitus) 25-30 years     am 05/15/2019    Encounter for blood transfusion     Gout 12/05/2017    right foot     Hyperlipemia     Hypertension     CHARLES (obstructive sleep apnea)     Spinal cord disease     Trouble in sleeping     Type 2 diabetes mellitus with diabetic polyneuropathy, without long-term current use of insulin     Uses hearing aid     bilat       Family History   Problem Relation Age of Onset    Hypertension Mother     Heart disease Mother     Diabetes Mother     Hypertension Father     Heart disease Father     Diabetes Father     Stroke Father     Diabetes Sister     Cancer Brother 50        pancreas    Drug abuse Brother     Prostate cancer Neg Hx     Macular degeneration Neg Hx     Retinal  detachment Neg Hx     Strabismus Neg Hx     Glaucoma Neg Hx     Blindness Neg Hx     Amblyopia Neg Hx     Kidney disease Neg Hx     Mental illness Neg Hx     Mental retardation Neg Hx     COPD Neg Hx     Asthma Neg Hx        Past Surgical History:   Procedure Laterality Date    ARTHROPLASTY-HIP (ANTERIOR APPROACH) Left 10/9/2017    [facility]cocer Sr[facility] OR    BACK SURGERY      BURSECTOMY - ELBOW Left 10/27/2017    [facility] OR    Colonoscopy N/A 4/7/2015    [facility]id [facility]ero [facility] at [facility] ENDO    HERNIA REPAIR Bilateral 04/18/2017    INJECTION-JOINT Left 5/18/2016    Performed by [facility]rol [facility]era [facility] at [facility] PAIN MGT    JOINT REPLACEMENT Left 10/09/2017    L YAHIR Dr. Alcocer    LAMINECTOMY  07/22/2016    LAMINECTOMY-LUMBAR-DECOMPRESSION - L3/4 Laminectomy and Cyst Removal L4/5 Laminectomy No Implants SNS: SSEP/EMG Open carlos + Aaron C-arm 2 hours 1st case N/A 7/22/2016    Performed by Sin Bacon [facility] at [facility] OR 2ND FLR    left elbow  10/2017    procedure to remove gout    lumbar foraminotomy  03/2018    REPAIR-HERNIA-INGUINAL-BILATERAL-LAPAROSCOPIC w/ mesh Bilateral 4/18/2017    Performed by Chauncey Ellis Jr., [facility] at [facility] OR 2ND FLR    REVISION, TOTAL ARTHROPLASTY, HIP Left 9/11/2018    [facility] Sr[facility] OR    ROTATOR CUFF REPAIR Left 2006    SHOULDER ARTHROSCOPY W/ ROTATOR CUFF REPAIR Right 2009    Transforaminal Epidural Steroid Injection-Left L3-4 & INJECTION-FACET-Left L3-4 Facet Joint Injection & Cyst Aspiration Left 6/14/2016    Per[facility] Desmond Hutchinson [facility] at [facility] PAIN MGT       Review of Systems   Constitutional: Negative for activity change, appetite change, chills, diaphoresis, fatigue, fever and unexpected weight change.   HENT: Negative for nosebleeds, sore throat, trouble swallowing and voice change.    Eyes: Negative for visual disturbance.   Respiratory: Negative for  cough, chest tightness, shortness of breath and wheezing.    Cardiovascular: Negative for chest pain, palpitations and leg swelling.   Gastrointestinal: Negative for abdominal distention, abdominal pain, anal bleeding, blood in stool, constipation, diarrhea, nausea and vomiting.   Genitourinary: Negative for difficulty urinating, dysuria and hematuria.   Musculoskeletal: Positive for arthralgias and gait problem. Negative for myalgias.        Patient walks with a cane   Skin: Negative for rash and wound.   Neurological: Positive for weakness. Negative for dizziness, light-headedness and headaches.   Hematological: Does not bruise/bleed easily.   Psychiatric/Behavioral: The patient is nervous/anxious.          Medication List with Changes/Refills   Current Medications    ACCU-CHEK FRANKO PLUS TEST STRP STRP    USE  1  STRIP ONE TIME DAILY    ALLOPURINOL (ZYLOPRIM) 100 MG TABLET    TAKE 1 TABLET EVERY DAY    ASPIRIN (ECOTRIN) 81 MG EC TABLET    Take 1 tablet (81 mg total) by mouth once daily.    BLOOD-GLUCOSE METER (ACCU-CHEK FRANKO PLUS METER) MISC    1 Device by Misc.(Non-Drug; Combo Route) route once daily.    CELECOXIB (CELEBREX) 200 MG CAPSULE    TAKE 1 CAPSULE(200 MG) BY MOUTH EVERY DAY    CLONAZEPAM (KLONOPIN) 0.5 MG DISINTEGRATING TABLET    DIS ONE T PO QHS    DOCUSATE SODIUM (COLACE) 100 MG CAPSULE    Take 1 capsule (100 mg total) by mouth 2 (two) times daily as needed.    FERROUS SULFATE 324 MG (65 MG IRON) TBEC    Take 1 tablet (324 mg total) by mouth once daily.    GABAPENTIN (NEURONTIN) 300 MG CAPSULE    Take 300 mg by mouth 2 (two) times daily.    GARLIC EXTRACT ORAL    Take 1 tablet by mouth once daily. Per [facility]    GLIPIZIDE (GLUCOTROL) 5 MG TABLET    TAKE 2 TABLETS (10 MG) TWO TIMES DAILY BEFORE MEALS    IBUPROFEN (ADVIL,MOTRIN) 100 MG TABLET        LANCETS (ACCU-CHEK SOFTCLIX LANCETS) MISC    1 lancet by Misc.(Non-Drug; Combo Route) route once daily.    LOSARTAN-HYDROCHLOROTHIAZIDE 50-12.5  MG (HYZAAR) 50-12.5 MG PER TABLET    TAKE 1 TABLET EVERY DAY    METFORMIN (GLUCOPHAGE) 1000 MG TABLET    TAKE 1 TABLET TWICE DAILY WITH MEALS    METOPROLOL SUCCINATE (TOPROL-XL) 50 MG 24 HR TABLET    TAKE 1 TABLET EVERY DAY    MULTIVITAMIN (ONE DAILY MULTIVITAMIN) PER TABLET    Take 1 tablet by mouth once daily.    NOZASEPTIN (NOZIN) NASAL     1 each by Each Nare route 2 (two) times daily. Complete supply provided by hospital    PRAVASTATIN (PRAVACHOL) 40 MG TABLET    TAKE 1 TABLET EVERY DAY    TADALAFIL (CIALIS) 5 MG TABLET    Take 1 tablet (5 mg total) by mouth daily as needed for Erectile Dysfunction.   Discontinued Medications    CLONAZEPAM (KLONOPIN ORAL)    Take 0.25 mg by mouth every evening. Per [facility]     Objective:     Vitals:    05/20/19 1017   BP: (!) 142/75   Pulse: 72   Resp: 18   Temp: 97.2 °F (36.2 °C)     Lab Results   Component Value Date    WBC 4.96 05/20/2019    HGB 11.6 (L) 05/20/2019    HCT 36.3 (L) 05/20/2019    MCV 88 05/20/2019     05/20/2019     Lab Results   Component Value Date    IRON 88 01/03/2019    TIBC 349 01/03/2019    FERRITIN 23 01/03/2019     BMP  Lab Results   Component Value Date     (H) 05/20/2019    K 4.2 05/20/2019     05/20/2019    CO2 25 05/20/2019    BUN 20 05/20/2019    CREATININE 1.7 (H) 05/20/2019    CALCIUM 9.2 05/20/2019    ANIONGAP 11 05/20/2019    ESTGFRAFRICA 44 (A) 05/20/2019    EGFRNONAA 38 (A) 05/20/2019     Lab Results   Component Value Date    ALT 21 05/20/2019    AST 24 05/20/2019    ALKPHOS 71 05/20/2019    BILITOT 0.3 05/20/2019               Physical Exam   Constitutional: He is oriented to person, place, and time. He appears well-developed and well-nourished. He is cooperative. No distress.   HENT:   Head: Normocephalic.   Right Ear: Hearing normal.   Left Ear: Hearing normal.   Nose: Nose normal.   Mouth/Throat: Oropharynx is clear and moist.   Eyes: Conjunctivae, EOM and lids are normal. Right eye exhibits no  discharge. Left eye exhibits no discharge.   Neck: Normal range of motion. No thyroid mass present.   Cardiovascular: Normal rate, regular rhythm and normal heart sounds. Exam reveals no gallop and no friction rub.   No murmur heard.  Pulmonary/Chest: Effort normal and breath sounds normal. No respiratory distress. He has no wheezes. He has no rales. He exhibits no tenderness.   Abdominal: Soft. He exhibits no distension and no mass. There is no tenderness. There is no guarding.   Genitourinary:   Genitourinary Comments: deferred   Musculoskeletal: Normal range of motion. He exhibits no edema.   Patient walks with a cane.    Neurological: He is alert and oriented to person, place, and time.   Skin: Skin is warm, dry and intact.   Psychiatric: His speech is normal and behavior is normal. Thought content normal.   Vitals reviewed.       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 Anemia workup essentially unremarkable. Anemia etiology multifactorial likely secondary to chronic arthritis. Patient also with mild renal insufficiency. He admits to inadequate fluid intake. Discussed adequate hydration of at least 64 oz fluid daily and avoiding nephrotoxic medications. For now will monitor anemia and plan for BMBx upon progression of anemia. Will add alpha globin gene analysis to labs from today to check for possible alpha thalassemia trait. Will recheck SPEP, FLC at follow up. F/U  4-6 months with labs few days prior. He knows to call sooner if questions, concerns, or anemia symptoms.         Relevant Orders    CBC auto differential    Comprehensive metabolic panel    Ferritin    Iron and TIBC    Alpha-Globin Gene Analysis    Protein electrophoresis, serum    Immunoglobulin free LT chains blood    Immunofixation electrophoresis            Plan:     Anemia due to unknown mechanism  -     CBC auto differential; Future; Expected date: 05/20/2019  -     Comprehensive metabolic panel; Future; Expected date: 05/20/2019  -     Ferritin; Future; Expected date: 05/20/2019  -     Iron and TIBC; Future; Expected date: 05/20/2019  -     Alpha-Globin Gene Analysis; Future; Expected date: 05/20/2019  -     Protein electrophoresis, serum; Future; Expected date: 05/20/2019  -     Immunoglobulin free LT chains blood; Future; Expected date: 05/20/2019  -     Immunofixation electrophoresis; Future; Expected date: 05/20/2019    Chronic anemia  -     CBC auto differential; Future; Expected date: 05/20/2019  -     Comprehensive metabolic panel; Future; Expected date: 05/20/2019  -     Ferritin; Future; Expected date: 05/20/2019  -     Iron and TIBC; Future; Expected date: 05/20/2019  -     Alpha-Globin Gene Analysis; Future; Expected date: 05/20/2019  -     Protein electrophoresis, serum; Future; Expected date: 05/20/2019  -     Immunoglobulin free LT chains blood; Future; Expected date: 05/20/2019  -     Immunofixation electrophoresis; Future; Expected date: 05/20/2019    Combined hyperlipidemia associated with type 2 diabetes mellitus    Essential hypertension          I will review assessment/plan with collaborating physician     ZEINAB Yuen   07-Apr-2019 19:23

## 2019-05-20 NOTE — ASSESSMENT & PLAN NOTE
Hemoglobin remains stable. Anemia workup essentially unremarkable. Anemia etiology multifactorial likely secondary to chronic arthritis. Patient also with mild renal insufficiency. He admits to inadequate fluid intake. Discussed adequate hydration of at least 64 oz fluid daily and avoiding nephrotoxic medications. For now will monitor anemia and plan for BMBx upon progression of anemia. Will add alpha globin gene analysis to labs from today to check for possible alpha thalassemia trait. Will recheck SPEP, FLC at follow up. F/U 4-6 months with labs few days prior. He knows to call sooner if questions, concerns, or anemia symptoms.

## 2019-05-22 ENCOUNTER — LAB VISIT (OUTPATIENT)
Dept: LAB | Facility: HOSPITAL | Age: 79
End: 2019-05-22
Attending: ORTHOPAEDIC SURGERY
Payer: MEDICARE

## 2019-05-22 ENCOUNTER — OFFICE VISIT (OUTPATIENT)
Dept: ORTHOPEDICS | Facility: CLINIC | Age: 79
End: 2019-05-22
Payer: MEDICARE

## 2019-05-22 VITALS — HEIGHT: 75 IN | BODY MASS INDEX: 29.27 KG/M2 | WEIGHT: 235.44 LBS

## 2019-05-22 DIAGNOSIS — Z96.642 CHRONIC HIP PAIN AFTER TOTAL REPLACEMENT OF LEFT HIP JOINT: Primary | ICD-10-CM

## 2019-05-22 DIAGNOSIS — G89.29 CHRONIC HIP PAIN AFTER TOTAL REPLACEMENT OF LEFT HIP JOINT: ICD-10-CM

## 2019-05-22 DIAGNOSIS — R29.898 WEAKNESS OF LEFT HIP: ICD-10-CM

## 2019-05-22 DIAGNOSIS — M25.552 CHRONIC HIP PAIN AFTER TOTAL REPLACEMENT OF LEFT HIP JOINT: Primary | ICD-10-CM

## 2019-05-22 DIAGNOSIS — G89.29 CHRONIC HIP PAIN AFTER TOTAL REPLACEMENT OF LEFT HIP JOINT: Primary | ICD-10-CM

## 2019-05-22 DIAGNOSIS — Z96.642 CHRONIC HIP PAIN AFTER TOTAL REPLACEMENT OF LEFT HIP JOINT: ICD-10-CM

## 2019-05-22 DIAGNOSIS — M25.552 CHRONIC HIP PAIN AFTER TOTAL REPLACEMENT OF LEFT HIP JOINT: ICD-10-CM

## 2019-05-22 LAB
CRP SERPL-MCNC: 1.9 MG/L (ref 0–8.2)
ERYTHROCYTE [SEDIMENTATION RATE] IN BLOOD BY WESTERGREN METHOD: 16 MM/HR (ref 0–23)

## 2019-05-22 PROCEDURE — 1101F PT FALLS ASSESS-DOCD LE1/YR: CPT | Mod: HCNC,CPTII,S$GLB, | Performed by: ORTHOPAEDIC SURGERY

## 2019-05-22 PROCEDURE — 1101F PR PT FALLS ASSESS DOC 0-1 FALLS W/OUT INJ PAST YR: ICD-10-PCS | Mod: HCNC,CPTII,S$GLB, | Performed by: ORTHOPAEDIC SURGERY

## 2019-05-22 PROCEDURE — 85652 RBC SED RATE AUTOMATED: CPT | Mod: HCNC

## 2019-05-22 PROCEDURE — 99214 OFFICE O/P EST MOD 30 MIN: CPT | Mod: HCNC,S$GLB,, | Performed by: ORTHOPAEDIC SURGERY

## 2019-05-22 PROCEDURE — 99999 PR PBB SHADOW E&M-EST. PATIENT-LVL III: CPT | Mod: PBBFAC,HCNC,, | Performed by: ORTHOPAEDIC SURGERY

## 2019-05-22 PROCEDURE — 86140 C-REACTIVE PROTEIN: CPT | Mod: HCNC

## 2019-05-22 PROCEDURE — 36415 COLL VENOUS BLD VENIPUNCTURE: CPT | Mod: HCNC

## 2019-05-22 PROCEDURE — 99999 PR PBB SHADOW E&M-EST. PATIENT-LVL III: ICD-10-PCS | Mod: PBBFAC,HCNC,, | Performed by: ORTHOPAEDIC SURGERY

## 2019-05-22 PROCEDURE — 99214 PR OFFICE/OUTPT VISIT, EST, LEVL IV, 30-39 MIN: ICD-10-PCS | Mod: HCNC,S$GLB,, | Performed by: ORTHOPAEDIC SURGERY

## 2019-05-22 NOTE — PROGRESS NOTES
Subjective:      Patient ID: Srinath Mcknight is a 78 y.o. male.    Chief Complaint: Pain of the Left Hip    HPI  Srinath Mcknight is a 78 year old male here with a greater than 2 year(s) history of left hip pain. The patient is retiree. There was not a history of recent  trauma.  The pain is severe The pain is located in the groin.  There is is radiation.  The pain radaites to the thigh.  The pain is described as achy.  It is aggravated by standing and walking.  It is alleviated by rest. There is not numbness or tingling of the lower extremity. Anterior YAHIR by Dr. Ortega 10/17, revision 9/18.  There was not problems with wound healing or infection.  The patient has not had a new infection work up.  The patient  has not tried medications or injections. The patient does have difficulty getting in or out of a car, getting dressed, or going up or down stairs.        Past Medical History:   Diagnosis Date    Anemia due to unknown mechanism 11/10/2015    Angina pectoris 2014    not since    Arthritis     Back pain     Coronary artery disease     DM (diabetes mellitus) 25-30 years     am 05/15/2019    Encounter for blood transfusion     Gout 12/05/2017    right foot     Hyperlipemia     Hypertension     CHARLES (obstructive sleep apnea)     Spinal cord disease     Trouble in sleeping     Type 2 diabetes mellitus with diabetic polyneuropathy, without long-term current use of insulin     Uses hearing aid     bilat     Past Surgical History:   Procedure Laterality Date    ARTHROPLASTY-HIP (ANTERIOR APPROACH) Left 10/9/2017    Performed by Albaro Ortega Sr., [facility] at [facility] OR    BACK SURGERY      BURSECTOMY - ELBOW Left 10/27/2017    Performed by Albaro Ortega Sr., MD at [facility] OR    Colonoscopy N/A 4/7/2015    Performed by Vahid Romero MD at [facility] ENDO    HERNIA REPAIR Bilateral 04/18/2017    INJECTION-JOINT Left 5/18/2016    Performed [facility]rol Madera [facility] at [facility] PAIN T    JOINT REPLACEMENT  Left 10/09/2017    L YAHIR Dr. Alcocer    LAMINECTOMY  07/22/2016    LAMINECTOMY-LUMBAR-DECOMPRESSION - L3/4 Laminectomy and Cyst Removal L4/5 Laminectomy No Implants SNS: SSEP/EMG Open carlos + Aaron C-arm 2 hours 1st case N/A 7/22/2016    Performed by Sin Bacon MD at [facility] OR 2ND FLR    left elbow  10/2017    procedure to remove gout    lumbar foraminotomy  03/2018    REPAIR-HERNIA-INGUINAL-BILATERAL-LAPAROSCOPIC w/ mesh Bilateral 4/18/2017    Performed by Chauncey Ellis Jr., MD at [facility] OR 2ND FLR    REVISION, TOTAL ARTHROPLASTY, HIP Left 9/11/2018    Performed by [facility]baro Alcocer Sr., [facility] at [facility] OR    ROTATOR CUFF REPAIR Left 2006    SHOULDER ARTHROSCOPY W/ ROTATOR CUFF REPAIR Right 2009    Transforaminal Epidural Steroid Injection-Left L3-4 & INJECTION-FACET-Left L3-4 Facet Joint Injection & Cyst Aspiration Left 6/14/2016    Performed by Desmond Hutchinson MD at [facility] PAIN MGT     Family History   Problem Relation Age of Onset    Hypertension Mother     Heart disease Mother     Diabetes Mother     Hypertension Father     Heart disease Father     Diabetes Father     Stroke Father     Diabetes Sister     Cancer Brother 50        pancreas    Drug abuse Brother     Prostate cancer Neg Hx     Macular degeneration Neg Hx     Retinal detachment Neg Hx     Strabismus Neg Hx     Glaucoma Neg Hx     Blindness Neg Hx     Amblyopia Neg Hx     Kidney disease Neg Hx     Mental illness Neg Hx     Mental retardation Neg Hx     COPD Neg Hx     Asthma Neg Hx      Social History     Socioeconomic History    Marital status:      Spouse name: Not on file    Number of children: 0    Years of education: Not on file    Highest education level: Not on file   Occupational History    Occupation: retired     Comment: teacher   Social Needs    Financial resource strain: Not on file    Food insecurity:     Worry: Not on file     Inability: Not on file    Transportation needs:     Medical:  Not on file     Non-medical: Not on file   Tobacco Use    Smoking status: Never Smoker    Smokeless tobacco: Never Used   Substance and Sexual Activity    Alcohol use: Yes     Alcohol/week: 0.6 oz     Types: 1 Glasses of wine per week     Comment: rarely  No alcohol prior to surgery    Drug use: No    Sexual activity: Yes     Partners: Female     Birth control/protection: Condom   Lifestyle    Physical activity:     Days per week: Not on file     Minutes per session: Not on file    Stress: Not on file   Relationships    Social connections:     Talks on phone: Not on file     Gets together: Not on file     Attends Anabaptist service: Not on file     Active member of club or organization: Not on file     Attends meetings of clubs or organizations: Not on file     Relationship status: Not on file   Other Topics Concern    Not on file   Social History Narrative    Single lives alone. No children. Retired but some part time work - 4 hours a day. 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 Caffeine intake - sugar free cola, Rare coffee intake. Still drives. Does have a Living Will . Has a nephew Jt Gayle, lives in [place]. Has a friend Karina Rossi, locally who could help him.      Current Outpatient Medications on File Prior to Visit   Medication Sig Dispense Refill    ACCU-CHEK FRANKO PLUS TEST STRP Strp USE  1  STRIP ONE TIME DAILY 100 strip 1    allopurinol (ZYLOPRIM) 100 MG tablet TAKE 1 TABLET EVERY DAY 90 tablet 1    aspirin (ECOTRIN) 81 MG EC tablet Take 1 tablet (81 mg total) by mouth once daily.  0    blood-glucose meter (ACCU-CHEK FRANKO PLUS METER) Misc 1 Device by Misc.(Non-Drug; Combo Route) route once daily. 1 each 0    celecoxib (CELEBREX) 200 MG capsule TAKE 1 CAPSULE(200 MG) BY MOUTH EVERY DAY 90 capsule 0    clonazePAM (KLONOPIN) 0.5 MG disintegrating tablet DIS ONE T PO QHS  0    docusate sodium (COLACE) 100 MG capsule Take 1 capsule (100 mg total) by mouth 2 (two) times daily as needed.  (Patient taking differently: Take 100 mg by mouth 2 (two) times daily. Per [facility]) 60 capsule 0    ferrous sulfate 324 mg (65 mg iron) TbEC Take 1 tablet (324 mg total) by mouth once daily.      gabapentin (NEURONTIN) 300 MG capsule Take 300 mg by mouth 2 (two) times daily.      GARLIC EXTRACT ORAL Take 1 tablet by mouth once daily. Per [facility]      glipiZIDE (GLUCOTROL) 5 MG tablet TAKE 2 TABLETS (10 MG) TWO TIMES DAILY BEFORE MEALS 360 tablet 4    ibuprofen (ADVIL,MOTRIN) 100 MG tablet       lancets (ACCU-CHEK SOFTCLIX LANCETS) Misc 1 lancet by Misc.(Non-Drug; Combo Route) route once daily. 100 each 2    losartan-hydrochlorothiazide 50-12.5 mg (HYZAAR) 50-12.5 mg per tablet TAKE 1 TABLET EVERY DAY 90 tablet 4    metFORMIN (GLUCOPHAGE) 1000 MG tablet TAKE 1 TABLET TWICE DAILY WITH MEALS 180 tablet 4    metoprolol succinate (TOPROL-XL) 50 MG 24 hr tablet TAKE 1 TABLET EVERY DAY 90 tablet 3    multivitamin (ONE DAILY MULTIVITAMIN) per tablet Take 1 tablet by mouth once daily.      nozaseptin (NOZIN) nasal  1 each by Each Nare route 2 (two) times daily. Complete supply provided by [facility] 1 applicator 0    pravastatin (PRAVACHOL) 40 MG tablet TAKE 1 TABLET EVERY DAY 90 tablet 0    tadalafil (CIALIS) 5 MG tablet Take 1 tablet (5 mg total) by mouth daily as needed for Erectile Dysfunction. 30 tablet 0     No current facility-administered medications on file prior to visit.      Review of patient's allergies indicates:   Allergen Reactions    Sulfa (sulfonamide antibiotics)      Can't recall from 1995       Review of Systems   Constitution: Negative for chills, fever and night sweats.   HENT: Negative for hearing loss.    Eyes: Negative for blurred vision and double vision.   Cardiovascular: Negative for chest pain, claudication and leg swelling.   Respiratory: Negative for shortness of breath.    Endocrine: Negative for polydipsia, polyphagia and polyuria.  "  Hematologic/Lymphatic: Negative for adenopathy and bleeding problem. Does not bruise/bleed easily.   Skin: Negative for poor wound healing.   Musculoskeletal: Positive for joint pain.   Gastrointestinal: Negative for diarrhea and heartburn.   Genitourinary: Negative for bladder incontinence.   Neurological: Negative for focal weakness, headaches, numbness, paresthesias and sensory change.   Psychiatric/Behavioral: The patient is not nervous/anxious.    Allergic/Immunologic: Negative for persistent infections.         Objective:      Body mass index is 29.43 kg/m².  Vitals:    05/22/19 1415   Weight: 106.8 kg (235 lb 7.2 oz)   Height: 6' 3" (1.905 m)         General    Constitutional: He is oriented to person, place, and time. He appears well-developed and well-nourished.   HENT:   Head: Normocephalic and atraumatic.   Eyes: EOM are normal.   Cardiovascular: Normal rate.    Pulmonary/Chest: Effort normal.   Neurological: He is alert and oriented to person, place, and time.   Psychiatric: He has a normal mood and affect. His behavior is normal.     General Musculoskeletal Exam   Gait: abnormal and antalgic   Pelvic Obliquity: none      Right Knee Exam     Inspection   Alignment:  normal  Effusion: absent    Left Knee Exam     Inspection   Alignment:  normal  Effusion: absent    Right Hip Exam     Inspection   Scars: absent  Swelling: absent  Bruising: absent  No deformity of hip.  Quadriceps Atrophy:  Negative  Erythema: absent    Range of Motion   Abduction: 25   Adduction: 20   Extension: 0   Flexion: 100   External rotation: 30   Internal rotation: 25     Tests   Pain w/ forced internal rotation (GELY): absent  Stinchfield test: negative    Other   Sensation: normal  Left Hip Exam     Inspection   Scars: present (Anterior and Posterior)  Swelling: absent  No deformity of hip.  Quadriceps Atrophy:  negative  Erythema: absent  Bruising: absent    Tenderness   The patient tender to palpation of the psoas " tendon.    Range of Motion   Abduction: 20   Adduction: 15   Extension: 0   Flexion: 90   External rotation: 20   Internal rotation: 15     Tests   Pain w/ forced internal rotation (GELY): present  Stinchfield test: positive  Log Roll: positive    Other   Sensation: normal      Back (L-Spine & T-Spine) / Neck (C-Spine) Exam   Back exam is normal.      Muscle Strength   Right Lower Extremity   Hip Abduction: 5/5   Hip Adduction: 5/5   Hip Flexion: 5/5   Ankle Dorsiflexion:  5/5   Left Lower Extremity   Hip Abduction: 5/5   Hip Adduction: 5/5   Hip Flexion: 4/5   Ankle Dorsiflexion:  5/5     Reflexes     Left Side  Quadriceps:  2+    Right Side   Quadriceps:  2+    Vascular Exam     Right Pulses  Dorsalis Pedis:      2+          Left Pulses  Dorsalis Pedis:      2+          Capillary Refill  Right Hand: normal capillary refill  Left Hand: normal capillary refill    Edema  Right Upper Leg: absent  Left Upper Leg: absent      Radiographs taken today were reviewed by me.  There is a prosthetic replacement of the left hip(s).  The prosthesis is well positioned.  Questionable cortical scalloping laterally, but stem appears well fixed.      Assessment:       Encounter Diagnoses   Name Primary?   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  Will Call with results

## 2019-05-23 ENCOUNTER — TELEPHONE (OUTPATIENT)
Dept: ORTHOPEDICS | Facility: CLINIC | Age: 79
End: 2019-05-23

## 2019-05-23 NOTE — TELEPHONE ENCOUNTER
Spoke with pt, notified him of his results and scheduled a MARS MRI per . Pt verbalized understanding and had no further questions.    ----- Message from Alfred Gomez MD sent at 5/23/2019 10:34 AM CDT -----  Please call pt.  No infection based on labs.  He needs a MARS MRI of his left hip.

## 2019-05-29 DIAGNOSIS — G47.50 PARASOMNIA: ICD-10-CM

## 2019-05-29 RX ORDER — CLONAZEPAM 0.5 MG/1
TABLET, ORALLY DISINTEGRATING ORAL
Qty: 90 TABLET | Refills: 0 | Status: SHIPPED | OUTPATIENT
Start: 2019-05-29 | End: 2020-11-18

## 2019-05-30 ENCOUNTER — HOSPITAL ENCOUNTER (OUTPATIENT)
Dept: RADIOLOGY | Facility: HOSPITAL | Age: 79
Discharge: HOME | End: 2019-05-30
Attending: ORTHOPAEDIC SURGERY
Payer: MEDICARE

## 2019-05-30 DIAGNOSIS — R29.898 WEAKNESS OF LEFT HIP: ICD-10-CM

## 2019-05-30 DIAGNOSIS — Z96.642 CHRONIC HIP PAIN AFTER TOTAL REPLACEMENT OF LEFT HIP JOINT: ICD-10-CM

## 2019-05-30 DIAGNOSIS — G89.29 CHRONIC HIP PAIN AFTER TOTAL REPLACEMENT OF LEFT HIP JOINT: ICD-10-CM

## 2019-05-30 DIAGNOSIS — M25.552 CHRONIC HIP PAIN AFTER TOTAL REPLACEMENT OF LEFT HIP JOINT: ICD-10-CM

## 2019-05-30 PROCEDURE — 73721 MRI JNT OF LWR EXTRE W/O DYE: CPT | Mod: 26,HCNC,LT, | Performed by: RADIOLOGY

## 2019-05-30 PROCEDURE — 73721 MRI JNT OF LWR EXTRE W/O DYE: CPT | Mod: TC,HCNC,LT

## 2019-05-30 PROCEDURE — 73721 MRI HIP WITHOUT CONTRAST LEFT: ICD-10-PCS | Mod: 26,HCNC,LT, | Performed by: RADIOLOGY

## 2019-05-31 ENCOUNTER — PES CALL (OUTPATIENT)
Dept: ADMINISTRATIVE | Facility: CLINIC | Age: 79
End: 2019-05-31

## 2019-06-02 LAB
ALPHA GLOBIN RELEASED BY: NORMAL
ALPHA-GLOBIN SPECIMEN: NORMAL
GENETICIST REVIEW: NORMAL
HBA1 GENE MUT ANL BLD/T: NEGATIVE
HBA1 GENE MUT ANL BLD/T: NORMAL
REF LAB TEST METHOD: NORMAL
SERVICE CMNT-IMP: NORMAL
SPECIMEN SOURCE: NORMAL

## 2019-06-04 DIAGNOSIS — R29.898 WEAKNESS OF LEFT HIP: ICD-10-CM

## 2019-06-04 DIAGNOSIS — M25.552 CHRONIC HIP PAIN AFTER TOTAL REPLACEMENT OF LEFT HIP JOINT: Primary | ICD-10-CM

## 2019-06-04 DIAGNOSIS — Z96.642 CHRONIC HIP PAIN AFTER TOTAL REPLACEMENT OF LEFT HIP JOINT: Primary | ICD-10-CM

## 2019-06-04 DIAGNOSIS — G89.29 CHRONIC HIP PAIN AFTER TOTAL REPLACEMENT OF LEFT HIP JOINT: Primary | ICD-10-CM

## 2019-06-05 ENCOUNTER — OFFICE VISIT (OUTPATIENT)
Dept: INTERNAL MEDICINE | Facility: CLINIC | Age: 79
End: 2019-06-05
Payer: MEDICARE

## 2019-06-05 VITALS
HEIGHT: 75 IN | WEIGHT: 228.81 LBS | BODY MASS INDEX: 28.45 KG/M2 | TEMPERATURE: 97 F | DIASTOLIC BLOOD PRESSURE: 70 MMHG | HEART RATE: 70 BPM | SYSTOLIC BLOOD PRESSURE: 130 MMHG

## 2019-06-05 DIAGNOSIS — I25.10 CORONARY ARTERY DISEASE, OCCLUSIVE: ICD-10-CM

## 2019-06-05 DIAGNOSIS — R35.0 BENIGN PROSTATIC HYPERPLASIA WITH URINARY FREQUENCY: ICD-10-CM

## 2019-06-05 DIAGNOSIS — I10 ESSENTIAL HYPERTENSION: Chronic | ICD-10-CM

## 2019-06-05 DIAGNOSIS — E11.69 COMBINED HYPERLIPIDEMIA ASSOCIATED WITH TYPE 2 DIABETES MELLITUS: Chronic | ICD-10-CM

## 2019-06-05 DIAGNOSIS — M47.26 OSTEOARTHRITIS OF SPINE WITH RADICULOPATHY, LUMBAR REGION: Chronic | ICD-10-CM

## 2019-06-05 DIAGNOSIS — G47.33 OSA (OBSTRUCTIVE SLEEP APNEA): Chronic | ICD-10-CM

## 2019-06-05 DIAGNOSIS — Z12.5 ENCOUNTER FOR SCREENING FOR MALIGNANT NEOPLASM OF PROSTATE: ICD-10-CM

## 2019-06-05 DIAGNOSIS — E78.2 COMBINED HYPERLIPIDEMIA ASSOCIATED WITH TYPE 2 DIABETES MELLITUS: Chronic | ICD-10-CM

## 2019-06-05 DIAGNOSIS — Z00.00 ROUTINE GENERAL MEDICAL EXAMINATION AT HEALTH CARE FACILITY: ICD-10-CM

## 2019-06-05 DIAGNOSIS — E11.42 TYPE 2 DIABETES MELLITUS WITH DIABETIC POLYNEUROPATHY, WITHOUT LONG-TERM CURRENT USE OF INSULIN: Primary | Chronic | ICD-10-CM

## 2019-06-05 DIAGNOSIS — N40.1 BENIGN PROSTATIC HYPERPLASIA WITH URINARY FREQUENCY: ICD-10-CM

## 2019-06-05 PROCEDURE — 99499 RISK ADDL DX/OHS AUDIT: ICD-10-PCS | Mod: HCNC,S$GLB,, | Performed by: INTERNAL MEDICINE

## 2019-06-05 PROCEDURE — 3078F DIAST BP <80 MM HG: CPT | Mod: HCNC,CPTII,S$GLB, | Performed by: INTERNAL MEDICINE

## 2019-06-05 PROCEDURE — 3078F PR MOST RECENT DIASTOLIC BLOOD PRESSURE < 80 MM HG: ICD-10-PCS | Mod: HCNC,CPTII,S$GLB, | Performed by: INTERNAL MEDICINE

## 2019-06-05 PROCEDURE — 99999 PR PBB SHADOW E&M-EST. PATIENT-LVL III: CPT | Mod: PBBFAC,HCNC,, | Performed by: INTERNAL MEDICINE

## 2019-06-05 PROCEDURE — 3075F SYST BP GE 130 - 139MM HG: CPT | Mod: HCNC,CPTII,S$GLB, | Performed by: INTERNAL MEDICINE

## 2019-06-05 PROCEDURE — 99397 PR PREVENTIVE VISIT,EST,65 & OVER: ICD-10-PCS | Mod: HCNC,S$GLB,, | Performed by: INTERNAL MEDICINE

## 2019-06-05 PROCEDURE — 99499 UNLISTED E&M SERVICE: CPT | Mod: HCNC,S$GLB,, | Performed by: INTERNAL MEDICINE

## 2019-06-05 PROCEDURE — 3075F PR MOST RECENT SYSTOLIC BLOOD PRESS GE 130-139MM HG: ICD-10-PCS | Mod: HCNC,CPTII,S$GLB, | Performed by: INTERNAL MEDICINE

## 2019-06-05 PROCEDURE — 99999 PR PBB SHADOW E&M-EST. PATIENT-LVL III: ICD-10-PCS | Mod: PBBFAC,HCNC,, | Performed by: INTERNAL MEDICINE

## 2019-06-05 PROCEDURE — 99397 PER PM REEVAL EST PAT 65+ YR: CPT | Mod: HCNC,S$GLB,, | Performed by: INTERNAL MEDICINE

## 2019-06-05 RX ORDER — TAMSULOSIN HYDROCHLORIDE 0.4 MG/1
0.4 CAPSULE ORAL DAILY
Qty: 30 CAPSULE | Refills: 11 | Status: SHIPPED | OUTPATIENT
Start: 2019-06-05 | End: 2019-06-05

## 2019-06-05 RX ORDER — TAMSULOSIN HYDROCHLORIDE 0.4 MG/1
0.4 CAPSULE ORAL DAILY
Qty: 90 CAPSULE | Refills: 3 | Status: SHIPPED | OUTPATIENT
Start: 2019-06-05 | End: 2020-05-04

## 2019-06-05 NOTE — PROGRESS NOTES
Patient, Srinath Mcknight (MRN #[id number]), presented with a recent Estimated Glumerular Filtration Rate (EGFR) between 30 and 45 consistent with the definition of chronic kidney disease stage 3 - moderate (ICD10 - N18.3).    eGFR if    Date Value Ref Range Status   05/20/2019 44 (A) >60 mL/min/1.73 m^2 Final       The patient's chronic kidney disease stage 3 was monitored, evaluated, addressed and/or treated. This addendum to the medical record is made on 06/05/2019.

## 2019-06-05 NOTE — PROGRESS NOTES
Subjective:       Patient ID: Srinath Mcknight is a 78 y.o. male.    Chief Complaint: Follow-up    HPI Patient is a 78-year-old male presenting today for updated physical exam review of chronic health issues.  Patient has history of type 2 diabetes, hypertension, hyperlipidemia, CAD, arthritis, sleep apnea, BPH.  Overall he feels like things are doing pretty well at this time he continues take his medications as prescribed.  His diabetes blood pressure and cholesterol issues have been stable.  He is due for updated lab work.  He relates no issues from a coronary artery disease standpoint.  No chest pain or palpitations.    He continues to follow with [facility] for his back issues.  They feel like they might be at a point where they needed no another procedure because he has got a nerve that is being impinged upon and the spaces too narrow for interventional injection.  He is holding off as long as he can before proceeding with this sort of treatment.    He has a history of sleep apnea and he states he is doing well with it.  He is tolerating his equipment well no problems.    He has had ongoing issues with BPH.  He has frequency and nocturia as well as some urge incontinence.  He was on Flomax in the past which seemed to be helpful but he has been off of it for a while.  We discussed medications verses interventional options.  He does not wish to pursue intervention at this point so were going to go back to the Flomax again and see if we get some improvement.    Review of Systems   Constitutional: Negative for fever and unexpected weight change.   HENT: Negative for hearing loss, postnasal drip and rhinorrhea.    Eyes: Negative for pain and visual disturbance.   Respiratory: Negative for cough, shortness of breath and wheezing.    Cardiovascular: Negative for chest pain and palpitations.   Gastrointestinal: Negative for constipation, diarrhea, nausea and vomiting.   Genitourinary: Negative for dysuria and  "hematuria.   Musculoskeletal: Positive for arthralgias and back pain. Negative for myalgias and neck stiffness.   Skin: Negative for pallor and rash.   Neurological: Negative for seizures, syncope and headaches.   Hematological: Negative for adenopathy.   Psychiatric/Behavioral: Negative for dysphoric mood. The patient is not nervous/anxious.        Objective:   /70   Pulse 70   Temp 96.8 °F (36 °C)   Ht 6' 3" (1.905 m)   Wt 103.8 kg (228 lb 13.4 oz)   BMI 28.60 kg/m²      Physical Exam   Constitutional: He is oriented to person, place, and time. He appears well-developed and well-nourished. No distress.   HENT:   Head: Normocephalic and atraumatic.   Mouth/Throat: Oropharynx is clear and moist.   Eyes: Pupils are equal, round, and reactive to light. EOM are normal.   Neck: Normal range of motion. Neck supple. No JVD present. No thyromegaly present.   Cardiovascular: Normal rate, regular rhythm, normal heart sounds and intact distal pulses. Exam reveals no gallop and no friction rub.   No murmur heard.  Pulmonary/Chest: Effort normal and breath sounds normal. He has no wheezes. He has no rales.   Abdominal: Soft. Bowel sounds are normal. He exhibits no distension. There is no tenderness. There is no rebound and no guarding.   Musculoskeletal: Normal range of motion. He exhibits no edema.   Lymphadenopathy:     He has no cervical adenopathy.   Neurological: He is alert and oriented to person, place, and time. He has normal reflexes. No cranial nerve deficit.   Skin: Skin is warm and dry. No rash noted.   Psychiatric: He has a normal mood and affect. Judgment normal.   Vitals reviewed.          Assessment:       1. Type 2 diabetes mellitus with diabetic polyneuropathy, without long-term current use of insulin    2. Routine general medical examination at health care facility    3. Essential hypertension    4. Combined hyperlipidemia associated with type 2 diabetes mellitus    5. Coronary artery disease, " occlusive    6. Osteoarthritis of spine with radiculopathy, lumbar region    7. CHARLES (obstructive sleep apnea)    8. Benign prostatic hyperplasia with urinary frequency    9. Encounter for screening for malignant neoplasm of prostate        Plan:   Essential hypertension  Blood pressure is under good control.  We will continue the current regimen.  Will work on regular aerobic exercise and a low salt diet.        CHARLES (obstructive sleep apnea)  Patient is compliant with use of cpap, using it the majority of nights.  Benefit from the use of the device is noted.      Type 2 diabetes mellitus with diabetic polyneuropathy, without long-term current use of insulin  Comments:  Need updated labs, continue current meds.  Orders:  -     Comprehensive metabolic panel; Future; Expected date: 06/12/2019  -     Hemoglobin A1c; Future; Expected date: 06/12/2019  -     Lipid panel; Future; Expected date: 06/12/2019  -     Microalbumin/creatinine urine ratio; Future; Expected date: 06/12/2019    Routine general medical examination at health care facility    Essential hypertension    Combined hyperlipidemia associated with type 2 diabetes mellitus  Comments:  update labs,  continue pravastatin    Coronary artery disease, occlusive  Comments:  stable, no chest pain. no issues at this time.    Osteoarthritis of spine with radiculopathy, lumbar region    CHARLES (obstructive sleep apnea)    Benign prostatic hyperplasia with urinary frequency  Comments:  restart flomax (tamsulosin) 0.4 mg once daily  Orders:  -     Discontinue: tamsulosin (FLOMAX) 0.4 mg Cap; Take 1 capsule (0.4 mg total) by mouth once daily.  Dispense: 30 capsule; Refill: 11  -     tamsulosin (FLOMAX) 0.4 mg Cap; Take 1 capsule (0.4 mg total) by mouth once daily.  Dispense: 90 capsule; Refill: 3    Encounter for screening for malignant neoplasm of prostate  -     PSA, Screening; Future; Expected date: 06/12/2019          Follow up in about 8 weeks (around 7/31/2019).

## 2019-06-12 ENCOUNTER — LAB VISIT (OUTPATIENT)
Dept: LAB | Facility: HOSPITAL | Age: 79
End: 2019-06-12
Attending: INTERNAL MEDICINE
Payer: MEDICARE

## 2019-06-12 DIAGNOSIS — E11.42 TYPE 2 DIABETES MELLITUS WITH DIABETIC POLYNEUROPATHY, WITHOUT LONG-TERM CURRENT USE OF INSULIN: Chronic | ICD-10-CM

## 2019-06-12 DIAGNOSIS — Z12.5 ENCOUNTER FOR SCREENING FOR MALIGNANT NEOPLASM OF PROSTATE: ICD-10-CM

## 2019-06-12 LAB
ESTIMATED AVG GLUCOSE: 123 MG/DL (ref 68–131)
HBA1C MFR BLD HPLC: 5.9 % (ref 4–5.6)

## 2019-06-12 PROCEDURE — 83036 HEMOGLOBIN GLYCOSYLATED A1C: CPT | Mod: HCNC

## 2019-06-12 PROCEDURE — 36415 COLL VENOUS BLD VENIPUNCTURE: CPT | Mod: HCNC,PO

## 2019-06-12 PROCEDURE — 80061 LIPID PANEL: CPT | Mod: HCNC

## 2019-06-12 PROCEDURE — 84153 ASSAY OF PSA TOTAL: CPT | Mod: HCNC

## 2019-06-12 PROCEDURE — 80053 COMPREHEN METABOLIC PANEL: CPT | Mod: HCNC

## 2019-06-13 LAB
ALBUMIN SERPL BCP-MCNC: 3.7 G/DL (ref 3.5–5.2)
ALP SERPL-CCNC: 72 U/L (ref 55–135)
ALT SERPL W/O P-5'-P-CCNC: 18 U/L (ref 10–44)
ANION GAP SERPL CALC-SCNC: 11 MMOL/L (ref 8–16)
AST SERPL-CCNC: 25 U/L (ref 10–40)
BILIRUB SERPL-MCNC: 0.3 MG/DL (ref 0.1–1)
BUN SERPL-MCNC: 21 MG/DL (ref 8–23)
CALCIUM SERPL-MCNC: 9.4 MG/DL (ref 8.7–10.5)
CHLORIDE SERPL-SCNC: 107 MMOL/L (ref 95–110)
CHOLEST SERPL-MCNC: 153 MG/DL (ref 120–199)
CHOLEST/HDLC SERPL: 5.7 {RATIO} (ref 2–5)
CO2 SERPL-SCNC: 25 MMOL/L (ref 23–29)
COMPLEXED PSA SERPL-MCNC: 0.53 NG/ML (ref 0–4)
CREAT SERPL-MCNC: 1.6 MG/DL (ref 0.5–1.4)
EST. GFR  (AFRICAN AMERICAN): 47.7 ML/MIN/1.73 M^2
EST. GFR  (NON AFRICAN AMERICAN): 41.2 ML/MIN/1.73 M^2
GLUCOSE SERPL-MCNC: 102 MG/DL (ref 70–110)
HDLC SERPL-MCNC: 27 MG/DL (ref 40–75)
HDLC SERPL: 17.6 % (ref 20–50)
LDLC SERPL CALC-MCNC: 73.4 MG/DL (ref 63–159)
NONHDLC SERPL-MCNC: 126 MG/DL
POTASSIUM SERPL-SCNC: 4.3 MMOL/L (ref 3.5–5.1)
PROT SERPL-MCNC: 6.7 G/DL (ref 6–8.4)
SODIUM SERPL-SCNC: 143 MMOL/L (ref 136–145)
TRIGL SERPL-MCNC: 263 MG/DL (ref 30–150)

## 2019-06-24 ENCOUNTER — OFFICE VISIT (OUTPATIENT)
Dept: OPHTHALMOLOGY | Facility: CLINIC | Age: 79
End: 2019-06-24
Payer: MEDICARE

## 2019-06-24 DIAGNOSIS — H00.14 CHALAZION LEFT UPPER EYELID: ICD-10-CM

## 2019-06-24 DIAGNOSIS — H00.034 CELLULITIS OF LEFT UPPER EYELID: Primary | ICD-10-CM

## 2019-06-24 PROCEDURE — 92012 INTRM OPH EXAM EST PATIENT: CPT | Mod: HCNC,S$GLB,, | Performed by: OPTOMETRIST

## 2019-06-24 PROCEDURE — 99999 PR PBB SHADOW E&M-EST. PATIENT-LVL I: ICD-10-PCS | Mod: PBBFAC,HCNC,, | Performed by: OPTOMETRIST

## 2019-06-24 PROCEDURE — 92012 PR EYE EXAM, EST PATIENT,INTERMED: ICD-10-PCS | Mod: HCNC,S$GLB,, | Performed by: OPTOMETRIST

## 2019-06-24 PROCEDURE — 99999 PR PBB SHADOW E&M-EST. PATIENT-LVL I: CPT | Mod: PBBFAC,HCNC,, | Performed by: OPTOMETRIST

## 2019-06-24 RX ORDER — AMOXICILLIN AND CLAVULANATE POTASSIUM 875; 125 MG/1; MG/1
1 TABLET, FILM COATED ORAL 2 TIMES DAILY
Qty: 28 TABLET | Refills: 0 | Status: SHIPPED | OUTPATIENT
Start: 2019-06-24 | End: 2019-07-08

## 2019-06-24 NOTE — PROGRESS NOTES
HPI     Eye Problem      Additional comments: left eye               Comments     Left upper lid swollen, red x 1 1/2 week.  Patient using refresh tears eye drops in eye.  Patient not doing warn compresses.  Last eye exam 05/15/2019 TR[facility].          Last edited by Olimpia Ponce on 6/24/2019  1:35 PM. (History)            Assessment /Plan     For exam results, see Encounter Report.    Cellulitis of left upper eyelid  -     amoxicillin-clavulanate 875-125mg (AUGMENTIN) 875-125 mg per tablet; Take 1 tablet by mouth 2 (two) times daily. for 14 days  Dispense: 28 tablet; Refill: 0    Chalazion left upper eyelid      Worksheet given, warm compresses, lid scrubs    RTC if no improvement or worsens.

## 2019-06-28 ENCOUNTER — TELEPHONE (OUTPATIENT)
Dept: ENDOSCOPY | Facility: HOSPITAL | Age: 79
End: 2019-06-28

## 2019-06-28 NOTE — TELEPHONE ENCOUNTER
Pt left message on [employer]. Retrieved message and attempted to return pt call. Left pt a message to call our office.

## 2019-07-08 ENCOUNTER — TELEPHONE (OUTPATIENT)
Dept: ENDOSCOPY | Facility: HOSPITAL | Age: 79
End: 2019-07-08

## 2019-07-08 NOTE — TELEPHONE ENCOUNTER
Endoscopy Scheduling Questionnaire:    1. Have you been admitted overnight to the hospital in the past 3 months? no but has surgery scheduled in August. Will call back to schedule after back surgery.

## 2019-07-22 ENCOUNTER — OFFICE VISIT (OUTPATIENT)
Dept: URGENT CARE | Facility: CLINIC | Age: 79
End: 2019-07-22
Payer: MEDICARE

## 2019-07-22 ENCOUNTER — HOSPITAL ENCOUNTER (OUTPATIENT)
Dept: RADIOLOGY | Facility: HOSPITAL | Age: 79
Discharge: HOME | End: 2019-07-22
Attending: PHYSICIAN ASSISTANT
Payer: MEDICARE

## 2019-07-22 ENCOUNTER — TELEPHONE (OUTPATIENT)
Dept: INTERNAL MEDICINE | Facility: CLINIC | Age: 79
End: 2019-07-22

## 2019-07-22 VITALS
BODY MASS INDEX: 28.85 KG/M2 | TEMPERATURE: 96 F | WEIGHT: 230.81 LBS | DIASTOLIC BLOOD PRESSURE: 80 MMHG | SYSTOLIC BLOOD PRESSURE: 130 MMHG

## 2019-07-22 DIAGNOSIS — R10.9 FLANK PAIN: ICD-10-CM

## 2019-07-22 DIAGNOSIS — R10.9 FLANK PAIN: Primary | ICD-10-CM

## 2019-07-22 PROCEDURE — 74018 RADEX ABDOMEN 1 VIEW: CPT | Mod: 26,HCNC,, | Performed by: RADIOLOGY

## 2019-07-22 PROCEDURE — 99999 PR PBB SHADOW E&M-EST. PATIENT-LVL IV: CPT | Mod: PBBFAC,HCNC,, | Performed by: PHYSICIAN ASSISTANT

## 2019-07-22 PROCEDURE — 87086 URINE CULTURE/COLONY COUNT: CPT | Mod: HCNC

## 2019-07-22 PROCEDURE — 3079F PR MOST RECENT DIASTOLIC BLOOD PRESSURE 80-89 MM HG: ICD-10-PCS | Mod: HCNC,CPTII,S$GLB, | Performed by: PHYSICIAN ASSISTANT

## 2019-07-22 PROCEDURE — 1101F PT FALLS ASSESS-DOCD LE1/YR: CPT | Mod: HCNC,CPTII,S$GLB, | Performed by: PHYSICIAN ASSISTANT

## 2019-07-22 PROCEDURE — 99999 PR PBB SHADOW E&M-EST. PATIENT-LVL IV: ICD-10-PCS | Mod: PBBFAC,HCNC,, | Performed by: PHYSICIAN ASSISTANT

## 2019-07-22 PROCEDURE — 99214 OFFICE O/P EST MOD 30 MIN: CPT | Mod: HCNC,S$GLB,, | Performed by: PHYSICIAN ASSISTANT

## 2019-07-22 PROCEDURE — 3079F DIAST BP 80-89 MM HG: CPT | Mod: HCNC,CPTII,S$GLB, | Performed by: PHYSICIAN ASSISTANT

## 2019-07-22 PROCEDURE — 1101F PR PT FALLS ASSESS DOC 0-1 FALLS W/OUT INJ PAST YR: ICD-10-PCS | Mod: HCNC,CPTII,S$GLB, | Performed by: PHYSICIAN ASSISTANT

## 2019-07-22 PROCEDURE — 99214 PR OFFICE/OUTPT VISIT, EST, LEVL IV, 30-39 MIN: ICD-10-PCS | Mod: HCNC,S$GLB,, | Performed by: PHYSICIAN ASSISTANT

## 2019-07-22 PROCEDURE — 3075F SYST BP GE 130 - 139MM HG: CPT | Mod: HCNC,CPTII,S$GLB, | Performed by: PHYSICIAN ASSISTANT

## 2019-07-22 PROCEDURE — 3075F PR MOST RECENT SYSTOLIC BLOOD PRESS GE 130-139MM HG: ICD-10-PCS | Mod: HCNC,CPTII,S$GLB, | Performed by: PHYSICIAN ASSISTANT

## 2019-07-22 PROCEDURE — 74018 XR ABDOMEN AP 1 VIEW: ICD-10-PCS | Mod: 26,HCNC,, | Performed by: RADIOLOGY

## 2019-07-22 PROCEDURE — 74018 RADEX ABDOMEN 1 VIEW: CPT | Mod: TC,HCNC,FY,PO

## 2019-07-22 NOTE — TELEPHONE ENCOUNTER
----- Message from Lalita Ramsey sent at 7/22/2019  1:26 PM CDT -----  Contact: Patient  Type:  Same Day Appointment Request    Caller is requesting a same day appointment.  Caller declined first available appointment listed below.    Name of Caller:Patient  When is the first available appointment?7/30/19  Symptoms:Pain in side and light blood in urine  Best Call Back Number:[phone]  Additional Information:

## 2019-07-22 NOTE — PATIENT INSTRUCTIONS
Increase fluids   Tylenol 1000 mg every 6 hours for pain   Will call with x-ray results   If pain gets acutely worse, then go to the ED for further eval   If pain doesn't get any better, then follow-up with PCP

## 2019-07-22 NOTE — PROGRESS NOTES
Subjective:      Patient ID: Srinath Mcknight is a 78 y.o. male.    Chief Complaint: Flank Pain    [place] is a 78 year old male who presents to urgent care with left flank pain for the past 4-5 days.  He denies any pain with urination, urgency.  He admits frequency when he drinks a lot, but that is not new (taking flomax).  He admits seeing blood in his urine twice a few weeks ago but not since.  He saw Dr. Amador in December for blood in his urine.  He denies history of a kidney stone.  He does not recall hurting his side/pulling a muscle.      Flank Pain   This is a new problem. The current episode started in the past 7 days. The problem is unchanged. The pain is present in the costovertebral angle. The quality of the pain is described as aching. The pain does not radiate. The pain is the same all the time. Pertinent negatives include no bladder incontinence, bowel incontinence, dysuria, fever, numbness, tingling or weakness. Treatments tried: ibuprofen      Review of Systems   Constitutional: Negative for chills, diaphoresis, fatigue and fever.   Gastrointestinal: Negative for bowel incontinence, nausea and vomiting.   Genitourinary: Positive for flank pain, frequency (not new, whenever he drinks a lot of water ) and hematuria (twice a few weeks ago, but not since ). Negative for bladder incontinence, dysuria and urgency.   Neurological: Negative for tingling, weakness and numbness.       Objective:   /80 (BP Location: Left arm, Patient Position: Sitting, BP Method: Large (Manual))   Temp 96 °F (35.6 °C) (Tympanic)   Wt 104.7 kg (230 lb 13.2 oz)   BMI 28.85 kg/m²   Physical Exam   Constitutional: He is oriented to person, place, and time. He appears well-developed and well-nourished. No distress.   Abdominal: Soft. Normal appearance. There is no tenderness. There is CVA tenderness (left side ).   Neurological: He is alert and oriented to person, place, and time.   Skin: Skin is warm and dry. He is  not diaphoretic.     Assessment:      1. Flank pain       Plan:   Flank pain  -     POCT urinalysis, dipstick or tablet reag  -     X-Ray Abdomen Flat And Erect; Future; Expected date: 07/22/2019  -     Urine culture    Flank Pain    -  No leukocytes, nitrates or blood on urinalysis -  flank pain less likely due to infection    -  KUB - no kidney stones visualized    -  Recommended increasing fluids and tylenol for pain    -  Go to ED if pain gets acutely worse    -  Has appt with pcp in one week - discuss with pcp if not improved by appt     AVS provided and instructions reviewed with patient. Patient was counseled on supportive care and instructed to return or contact primary care provider if condition does not improve or for any new or worsening symptoms.    Skylar Gutierrez PA-C   Physician Assistant   [facility]

## 2019-07-22 NOTE — TELEPHONE ENCOUNTER
Called pt and let him know that Dr. Vieyra does not have anything available today, can schedule with Jacqui or can come in to UC for evaluation.  Pt said he will come in to UC.

## 2019-07-24 DIAGNOSIS — I25.10 CORONARY ARTERY DISEASE, OCCLUSIVE: Primary | ICD-10-CM

## 2019-07-24 LAB — BACTERIA UR CULT: NO GROWTH

## 2019-07-24 RX ORDER — GLIPIZIDE 5 MG/1
10 TABLET ORAL 2 TIMES DAILY WITH MEALS
Qty: 360 TABLET | Refills: 4 | Status: SHIPPED | OUTPATIENT
Start: 2019-07-24 | End: 2019-08-02 | Stop reason: SDUPTHER

## 2019-07-24 NOTE — TELEPHONE ENCOUNTER
----- Message from Jie Ahmet sent at 7/24/2019  2:36 PM CDT -----  Contact: Patient   Type:  RX Refill Request    Who Called: Patient   Refill or New Rx: Refill   RX Name and Strength: Glipizide 5 mg   How is the patient currently taking it? (ex. 1XDay): 2x day  Is this a 30 day or 90 day RX: 30  Preferred Pharmacy with phone number:   [id number] [place], [place] - [address] AT [facility] [address] [facility]  [address] [address]  Phone: [phone] Fax: [phone]  Local or Mail Order: Local  Ordering Provider: Dr. Wilder   Would the patient rather a call back or a response via [facility]? Call back   Best Call Back Number: Please call him at [phone]  Additional Information: n/a

## 2019-07-30 ENCOUNTER — OFFICE VISIT (OUTPATIENT)
Dept: CARDIOLOGY | Facility: CLINIC | Age: 79
End: 2019-07-30
Payer: MEDICARE

## 2019-07-30 ENCOUNTER — CLINICAL SUPPORT (OUTPATIENT)
Dept: CARDIOLOGY | Facility: CLINIC | Age: 79
End: 2019-07-30
Payer: MEDICARE

## 2019-07-30 VITALS
WEIGHT: 231.5 LBS | HEIGHT: 75 IN | BODY MASS INDEX: 28.78 KG/M2 | DIASTOLIC BLOOD PRESSURE: 62 MMHG | SYSTOLIC BLOOD PRESSURE: 130 MMHG | HEART RATE: 68 BPM

## 2019-07-30 DIAGNOSIS — I25.10 CORONARY ARTERY DISEASE, OCCLUSIVE: ICD-10-CM

## 2019-07-30 DIAGNOSIS — E11.69 COMBINED HYPERLIPIDEMIA ASSOCIATED WITH TYPE 2 DIABETES MELLITUS: Chronic | ICD-10-CM

## 2019-07-30 DIAGNOSIS — E78.2 COMBINED HYPERLIPIDEMIA ASSOCIATED WITH TYPE 2 DIABETES MELLITUS: Chronic | ICD-10-CM

## 2019-07-30 DIAGNOSIS — I10 ESSENTIAL HYPERTENSION: Chronic | ICD-10-CM

## 2019-07-30 DIAGNOSIS — Z01.810 PRE-OPERATIVE CARDIOVASCULAR EXAMINATION: Primary | ICD-10-CM

## 2019-07-30 PROCEDURE — 1101F PT FALLS ASSESS-DOCD LE1/YR: CPT | Mod: HCNC,CPTII,S$GLB, | Performed by: NUCLEAR MEDICINE

## 2019-07-30 PROCEDURE — 93005 ELECTROCARDIOGRAM TRACING: CPT | Mod: HCNC,S$GLB,, | Performed by: NUCLEAR MEDICINE

## 2019-07-30 PROCEDURE — 99499 RISK ADDL DX/OHS AUDIT: ICD-10-PCS | Mod: HCNC,S$GLB,, | Performed by: NUCLEAR MEDICINE

## 2019-07-30 PROCEDURE — 3078F DIAST BP <80 MM HG: CPT | Mod: HCNC,CPTII,S$GLB, | Performed by: NUCLEAR MEDICINE

## 2019-07-30 PROCEDURE — 99999 PR PBB SHADOW E&M-EST. PATIENT-LVL III: CPT | Mod: PBBFAC,HCNC,, | Performed by: NUCLEAR MEDICINE

## 2019-07-30 PROCEDURE — 93010 EKG 12-LEAD: ICD-10-PCS | Mod: HCNC,S$GLB,, | Performed by: INTERNAL MEDICINE

## 2019-07-30 PROCEDURE — 3078F PR MOST RECENT DIASTOLIC BLOOD PRESSURE < 80 MM HG: ICD-10-PCS | Mod: HCNC,CPTII,S$GLB, | Performed by: NUCLEAR MEDICINE

## 2019-07-30 PROCEDURE — 99215 OFFICE O/P EST HI 40 MIN: CPT | Mod: HCNC,S$GLB,, | Performed by: NUCLEAR MEDICINE

## 2019-07-30 PROCEDURE — 3075F PR MOST RECENT SYSTOLIC BLOOD PRESS GE 130-139MM HG: ICD-10-PCS | Mod: HCNC,CPTII,S$GLB, | Performed by: NUCLEAR MEDICINE

## 2019-07-30 PROCEDURE — 93010 ELECTROCARDIOGRAM REPORT: CPT | Mod: HCNC,S$GLB,, | Performed by: INTERNAL MEDICINE

## 2019-07-30 PROCEDURE — 3075F SYST BP GE 130 - 139MM HG: CPT | Mod: HCNC,CPTII,S$GLB, | Performed by: NUCLEAR MEDICINE

## 2019-07-30 PROCEDURE — 99999 PR PBB SHADOW E&M-EST. PATIENT-LVL III: ICD-10-PCS | Mod: PBBFAC,HCNC,, | Performed by: NUCLEAR MEDICINE

## 2019-07-30 PROCEDURE — 99215 PR OFFICE/OUTPT VISIT, EST, LEVL V, 40-54 MIN: ICD-10-PCS | Mod: HCNC,S$GLB,, | Performed by: NUCLEAR MEDICINE

## 2019-07-30 PROCEDURE — 99499 UNLISTED E&M SERVICE: CPT | Mod: HCNC,S$GLB,, | Performed by: NUCLEAR MEDICINE

## 2019-07-30 PROCEDURE — 93005 EKG 12-LEAD: ICD-10-PCS | Mod: HCNC,S$GLB,, | Performed by: NUCLEAR MEDICINE

## 2019-07-30 PROCEDURE — 1101F PR PT FALLS ASSESS DOC 0-1 FALLS W/OUT INJ PAST YR: ICD-10-PCS | Mod: HCNC,CPTII,S$GLB, | Performed by: NUCLEAR MEDICINE

## 2019-07-30 NOTE — PROGRESS NOTES
Subjective:   Patient ID:  Srinath Mcknight is a 78 y.o. male who presents for follow-up of Pre-op Exam; Coronary Artery Disease; and Hypertension      HPI   PRESENTS FOR PRE OP CARD EXAM- LUMBAR SPINAL FUSION,  [facility] BY DR GARAY.  HX OF CHRONIC CAD-ANGINA EQUIVALENT,  ESSENTIAL HNT,  DYSLIPIDEMIA AND T 2 DM  NO RECENT HOSPITALIZATIONS OR ED VISITS- LAST 6 MONTHS - FOR ACS, ACUTELY DECOMPENSATED HF,  CARD ARRHYTHMIAS. OR CVA  NO ANGINA OR EQUIVALENT  NO ORTHOPNEA OR PND  NO ABDOMINAL DISCOMFORT  NO EDEMA. NO CALVE TENDERNESS  NO HX OF DVT OR PE  NO HX OF SYNCOPE OR NEAR SYNCOPE  NO HX OF TIA OR STROKE  ECG TODAY- SR, 68 BMP. CHRONIC NS ST T CHANGES  NUCLEAR STRESS TEST DONE 2017-  NORMAL LV SYSTOLIC FUNCTION AND NEGATIVE PERFUSION ABNORMALITIES    Review of Systems   Constitution: Negative for chills, fever, night sweats, weight gain and weight loss.   HENT: Negative for nosebleeds.    Eyes: Negative for blurred vision, double vision and visual disturbance.   Cardiovascular: Negative for chest pain, dyspnea on exertion, irregular heartbeat, leg swelling, orthopnea, palpitations, paroxysmal nocturnal dyspnea and syncope.   Respiratory: Negative for cough, hemoptysis and wheezing.    Endocrine: Negative for polydipsia and polyuria.   Hematologic/Lymphatic: Does not bruise/bleed easily.   Skin: Negative for rash.   Musculoskeletal: Negative for joint pain, joint swelling, muscle weakness and myalgias.   Gastrointestinal: Negative for abdominal pain, hematemesis, jaundice and melena.   Genitourinary: Negative for dysuria, hematuria and nocturia.   Neurological: Negative for dizziness, focal weakness, headaches, sensory change and weakness.   Psychiatric/Behavioral: Negative for depression. The patient does not have insomnia and is not nervous/anxious.      Family History   Problem Relation Age of Onset    Hypertension Mother     Heart disease Mother     Diabetes Mother     Hypertension Father      Heart disease Father     Diabetes Father     Stroke Father     Diabetes Sister     Cancer Brother 50        pancreas    Drug abuse Brother     Prostate cancer Neg Hx     Macular degeneration Neg Hx     Retinal detachment Neg Hx     Strabismus Neg Hx     Glaucoma Neg Hx     Blindness Neg Hx     Amblyopia Neg Hx     Kidney disease Neg Hx     Mental illness Neg Hx     Mental retardation Neg Hx     COPD Neg Hx     Asthma Neg Hx      Past Medical History:   Diagnosis Date    Anemia due to unknown mechanism 11/10/2015    Angina pectoris 2014    not since    Arthritis     Back pain     Coronary artery disease     DM (diabetes mellitus) 25-30 years     am 05/15/2019    DM (diabetes mellitus)     BS 82 am 06/24/2019    Encounter for blood transfusion     Gout 12/05/2017    right foot     Hyperlipemia     Hypertension     CHARLES (obstructive sleep apnea)     Spinal cord disease     Trouble in sleeping     Type 2 diabetes mellitus with diabetic polyneuropathy, without long-term current use of insulin     Uses hearing aid     bilat     Social History     Socioeconomic History    Marital status:      Spouse name: Not on file    Number of children: 0    Years of education: Not on file    Highest education level: Not on file   Occupational History    Occupation: retired     Comment: teacher   Social Needs    Financial resource strain: Not on file    Food insecurity:     Worry: Not on file     Inability: Not on file    Transportation needs:     Medical: Not on file     Non-medical: Not on file   Tobacco Use    Smoking status: Never Smoker    Smokeless tobacco: Never Used   Substance and Sexual Activity    Alcohol use: Yes     Alcohol/week: 0.6 oz     Types: 1 Glasses of wine per week     Comment: rarely  No alcohol prior to surgery    Drug use: No    Sexual activity: Yes     Partners: Female     Birth control/protection: Condom   Lifestyle    Physical activity:     Days per  week: Not on file     Minutes per session: Not on file    Stress: Not on file   Relationships    Social connections:     Talks on phone: Not on file     Gets together: Not on file     Attends Restoration service: Not on file     Active member of club or organization: Not on file     Attends meetings of clubs or organizations: Not on file     Relationship status: Not on file   Other Topics Concern    Not on file   Social History Narrative    Single lives alone. No children. Retired but some part time work - 4 hours a day. 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 Caffeine intake - sugar free cola, Rare coffee intake. Still drives. Does have a Living Will . Has a nephew Jt Gayle, lives in [place]. Has a friend Karina Rossi, locally who could help him.      Current Outpatient Medications on File Prior to Visit   Medication Sig Dispense Refill    allopurinol (ZYLOPRIM) 100 MG tablet TAKE 1 TABLET EVERY DAY 90 tablet 1    aspirin (ECOTRIN) 81 MG EC tablet Take 1 tablet (81 mg total) by mouth once daily.  0    celecoxib (CELEBREX) 200 MG capsule TAKE 1 CAPSULE(200 MG) BY MOUTH EVERY DAY 90 capsule 0    clonazePAM (KLONOPIN) 0.5 MG disintegrating tablet DISSOLVE ONE TABLET BY MOUTH EVERY NIGHT AT BEDTIME 90 tablet 0    docusate sodium (COLACE) 100 MG capsule Take 1 capsule (100 mg total) by mouth 2 (two) times daily as needed. (Patient taking differently: Take 100 mg by mouth 2 (two) times daily. Per [facility]) 60 capsule 0    ferrous sulfate 324 mg (65 mg iron) TbEC Take 1 tablet (324 mg total) by mouth once daily.      gabapentin (NEURONTIN) 300 MG capsule Take 300 mg by mouth 3 (three) times daily.      GARLIC EXTRACT ORAL Take 1 tablet by mouth once daily. Per [facility]      glipiZIDE (GLUCOTROL) 5 MG tablet Take 2 tablets (10 mg total) by mouth 2 (two) times daily with meals. 360 tablet 4    losartan-hydrochlorothiazide 50-12.5 mg (HYZAAR) 50-12.5 mg per tablet TAKE 1 TABLET EVERY DAY 90  tablet 4    metFORMIN (GLUCOPHAGE) 1000 MG tablet TAKE 1 TABLET TWICE DAILY WITH MEALS 180 tablet 4    metoprolol succinate (TOPROL-XL) 50 MG 24 hr tablet TAKE 1 TABLET EVERY DAY 90 tablet 3    multivitamin (ONE DAILY MULTIVITAMIN) per tablet Take 1 tablet by mouth once daily.      pravastatin (PRAVACHOL) 40 MG tablet TAKE 1 TABLET EVERY DAY 90 tablet 0    tadalafil (CIALIS) 5 MG tablet Take 1 tablet (5 mg total) by mouth daily as needed for Erectile Dysfunction. 30 tablet 0    tamsulosin (FLOMAX) 0.4 mg Cap Take 1 capsule (0.4 mg total) by mouth once daily. 90 capsule 3    ACCU-CHEK FRANKO PLUS TEST STRP Strp USE  1  STRIP ONE TIME DAILY 100 strip 1    blood-glucose meter (ACCU-CHEK FRANKO PLUS METER) Misc 1 Device by Misc.(Non-Drug; Combo Route) route once daily. 1 each 0    lancets (ACCU-CHEK SOFTCLIX LANCETS) Misc 1 lancet by Misc.(Non-Drug; Combo Route) route once daily. 100 each 2    [DISCONTINUED] ibuprofen (ADVIL,MOTRIN) 100 MG tablet        No current facility-administered medications on file prior to visit.      Review of patient's allergies indicates:   Allergen Reactions    Sulfa (sulfonamide antibiotics)      Can't recall from 1995       Objective:     Physical Exam   Constitutional: He is oriented to person, place, and time. He appears well-developed. No distress.   HENT:   Head: Normocephalic.   Eyes: Pupils are equal, round, and reactive to light. Conjunctivae are normal.   Neck: Neck supple. No JVD present. No thyromegaly present.   Cardiovascular: Normal rate, regular rhythm, normal heart sounds and intact distal pulses. Exam reveals no gallop and no friction rub.   No murmur heard.  Pulses:       Carotid pulses are 2+ on the right side, and 2+ on the left side.       Radial pulses are 2+ on the right side, and 2+ on the left side.        Femoral pulses are 2+ on the right side, and 2+ on the left side.       Popliteal pulses are 2+ on the right side, and 2+ on the left side.         Dorsalis pedis pulses are 2+ on the right side, and 2+ on the left side.        Posterior tibial pulses are 2+ on the right side, and 2+ on the left side.   Pulmonary/Chest: Breath sounds normal. He has no wheezes. He has no rales. He exhibits no tenderness.   Abdominal: Soft. Bowel sounds are normal. He exhibits no mass. There is no hepatosplenomegaly. There is no tenderness.   Musculoskeletal: He exhibits no edema or tenderness.        Cervical back: Normal.        Thoracic back: Normal.        Lumbar back: Normal.   Lymphadenopathy:     He has no cervical adenopathy.     He has no axillary adenopathy.        Right: No supraclavicular adenopathy present.        Left: No supraclavicular adenopathy present.   Neurological: He is alert and oriented to person, place, and time. He has normal strength. No sensory deficit. Gait normal.   Skin: Skin is warm. No cyanosis. No pallor. Nails show no clubbing.   Psychiatric: He has a normal mood and affect. His speech is normal and behavior is normal. Cognition and memory are normal.       Assessment:     1. Pre-operative cardiovascular examination    2. Coronary artery disease, occlusive    3. Essential hypertension    4. Combined hyperlipidemia associated with type 2 diabetes mellitus        Plan:     Pre-operative cardiovascular examination  STABLE CV STATUS-  NO CV CONTRAINDICATIONS FOR SURGERY AND ANESTHESIA  NO FURTHER CARD WORK UP   CAN HOLD ASA, 10 DAYS PRIOR TO PROCEDURE  CONTINUE BBS IN THE MORNING OF PROCEDURE    Coronary artery disease, occlusive  NO CLINICAL EVIDENCE OF ACTIVE MYOCARDIAL ISCHEMIA  NO ARRHYTHMIAS  NO ADHF    Essential hypertension  WELL CONTROLLED BP  CNS STATUS STABLE    Combined hyperlipidemia associated with type 2 diabetes mellitus  STATIN WELL TOLERATED    RETURN IN 6 MONTHS.

## 2019-07-31 ENCOUNTER — HOSPITAL ENCOUNTER (OUTPATIENT)
Dept: RADIOLOGY | Facility: HOSPITAL | Age: 79
Discharge: HOME | End: 2019-07-31
Attending: INTERNAL MEDICINE
Payer: MEDICARE

## 2019-07-31 ENCOUNTER — OFFICE VISIT (OUTPATIENT)
Dept: INTERNAL MEDICINE | Facility: CLINIC | Age: 79
End: 2019-07-31
Payer: MEDICARE

## 2019-07-31 VITALS
SYSTOLIC BLOOD PRESSURE: 120 MMHG | TEMPERATURE: 97 F | HEIGHT: 75 IN | BODY MASS INDEX: 28.78 KG/M2 | HEART RATE: 74 BPM | WEIGHT: 231.5 LBS | DIASTOLIC BLOOD PRESSURE: 70 MMHG

## 2019-07-31 DIAGNOSIS — G47.33 OSA (OBSTRUCTIVE SLEEP APNEA): Chronic | ICD-10-CM

## 2019-07-31 DIAGNOSIS — E78.2 COMBINED HYPERLIPIDEMIA ASSOCIATED WITH TYPE 2 DIABETES MELLITUS: Chronic | ICD-10-CM

## 2019-07-31 DIAGNOSIS — M47.816 LUMBAR SPONDYLOSIS: ICD-10-CM

## 2019-07-31 DIAGNOSIS — Z01.818 PREOP EXAM FOR INTERNAL MEDICINE: ICD-10-CM

## 2019-07-31 DIAGNOSIS — I10 ESSENTIAL HYPERTENSION: Chronic | ICD-10-CM

## 2019-07-31 DIAGNOSIS — E11.69 COMBINED HYPERLIPIDEMIA ASSOCIATED WITH TYPE 2 DIABETES MELLITUS: Chronic | ICD-10-CM

## 2019-07-31 DIAGNOSIS — I25.10 CORONARY ARTERY DISEASE, OCCLUSIVE: ICD-10-CM

## 2019-07-31 DIAGNOSIS — Z01.818 PREOP EXAM FOR INTERNAL MEDICINE: Primary | ICD-10-CM

## 2019-07-31 DIAGNOSIS — E11.42 TYPE 2 DIABETES MELLITUS WITH DIABETIC POLYNEUROPATHY, WITHOUT LONG-TERM CURRENT USE OF INSULIN: Chronic | ICD-10-CM

## 2019-07-31 PROBLEM — Z96.642 STATUS POST TOTAL REPLACEMENT OF LEFT HIP: Status: RESOLVED | Noted: 2018-09-12 | Resolved: 2019-07-31

## 2019-07-31 LAB
BACTERIA #/AREA URNS AUTO: NORMAL /HPF
BILIRUB UR QL STRIP: NEGATIVE
CLARITY UR REFRACT.AUTO: ABNORMAL
COLOR UR AUTO: YELLOW
GLUCOSE UR QL STRIP: NEGATIVE
HGB UR QL STRIP: NEGATIVE
KETONES UR QL STRIP: NEGATIVE
LEUKOCYTE ESTERASE UR QL STRIP: NEGATIVE
MICROSCOPIC COMMENT: NORMAL
NITRITE UR QL STRIP: NEGATIVE
PH UR STRIP: 5 [PH] (ref 5–8)
PROT UR QL STRIP: NEGATIVE
RBC #/AREA URNS AUTO: 1 /HPF (ref 0–4)
SP GR UR STRIP: 1.01 (ref 1–1.03)
URN SPEC COLLECT METH UR: ABNORMAL
WBC #/AREA URNS AUTO: 0 /HPF (ref 0–5)

## 2019-07-31 PROCEDURE — 3074F SYST BP LT 130 MM HG: CPT | Mod: HCNC,CPTII,S$GLB, | Performed by: INTERNAL MEDICINE

## 2019-07-31 PROCEDURE — 99999 PR PBB SHADOW E&M-EST. PATIENT-LVL III: ICD-10-PCS | Mod: PBBFAC,HCNC,, | Performed by: INTERNAL MEDICINE

## 2019-07-31 PROCEDURE — 71046 XR CHEST PA AND LATERAL: ICD-10-PCS | Mod: 26,HCNC,, | Performed by: RADIOLOGY

## 2019-07-31 PROCEDURE — 81001 URINALYSIS AUTO W/SCOPE: CPT | Mod: HCNC

## 2019-07-31 PROCEDURE — 3078F DIAST BP <80 MM HG: CPT | Mod: HCNC,CPTII,S$GLB, | Performed by: INTERNAL MEDICINE

## 2019-07-31 PROCEDURE — 3078F PR MOST RECENT DIASTOLIC BLOOD PRESSURE < 80 MM HG: ICD-10-PCS | Mod: HCNC,CPTII,S$GLB, | Performed by: INTERNAL MEDICINE

## 2019-07-31 PROCEDURE — 71046 X-RAY EXAM CHEST 2 VIEWS: CPT | Mod: 26,HCNC,, | Performed by: RADIOLOGY

## 2019-07-31 PROCEDURE — 71046 X-RAY EXAM CHEST 2 VIEWS: CPT | Mod: TC,HCNC,FY,PO

## 2019-07-31 PROCEDURE — 1101F PR PT FALLS ASSESS DOC 0-1 FALLS W/OUT INJ PAST YR: ICD-10-PCS | Mod: HCNC,CPTII,S$GLB, | Performed by: INTERNAL MEDICINE

## 2019-07-31 PROCEDURE — 99214 OFFICE O/P EST MOD 30 MIN: CPT | Mod: HCNC,S$GLB,, | Performed by: INTERNAL MEDICINE

## 2019-07-31 PROCEDURE — 99999 PR PBB SHADOW E&M-EST. PATIENT-LVL III: CPT | Mod: PBBFAC,HCNC,, | Performed by: INTERNAL MEDICINE

## 2019-07-31 PROCEDURE — 99214 PR OFFICE/OUTPT VISIT, EST, LEVL IV, 30-39 MIN: ICD-10-PCS | Mod: HCNC,S$GLB,, | Performed by: INTERNAL MEDICINE

## 2019-07-31 PROCEDURE — 1101F PT FALLS ASSESS-DOCD LE1/YR: CPT | Mod: HCNC,CPTII,S$GLB, | Performed by: INTERNAL MEDICINE

## 2019-07-31 PROCEDURE — 3074F PR MOST RECENT SYSTOLIC BLOOD PRESSURE < 130 MM HG: ICD-10-PCS | Mod: HCNC,CPTII,S$GLB, | Performed by: INTERNAL MEDICINE

## 2019-07-31 NOTE — PROGRESS NOTES
Subjective:       Patient ID: Srinath Mcknight is a 78 y.o. male.    Chief Complaint: No chief complaint on file.    HPI Patient is a 78-year-old male presenting today for perioperative risk assessment requested by Dr. Iqbal.  Patient has history of lumbar spondylosis and degenerative disc disease in his lumbar spine.  He has had issues with spinal stenosis.  He has been following with Dr. Iqbal for some time now.  It is felt at this time surgical intervention is necessary.  There is plan to proceed with lumbar fusion.    Patient has had multiple surgical procedures in the past.  He relates no history of anesthesia reactions.  He has had no problems with hemorrhaging.  He has noted no issues with DVT or pulmonary embolus.    Patient has a history of coronary artery disease.  He was seen by his cardiologist yesterday and was given approval to proceed with surgery without further workup.    Patient has no underlying lung disease.    Patient has a history of type 2 diabetes, hypertension, hyperlipidemia and sleep apnea.  His diabetes has been under good control.  His blood pressure and hyperlipidemia has also been under good control.  There have been no issues with sleep apnea.    Review of Systems   Constitutional: Negative for fever and unexpected weight change.   HENT: Negative for hearing loss, postnasal drip and rhinorrhea.    Eyes: Negative for pain and visual disturbance.   Respiratory: Negative for cough, shortness of breath and wheezing.    Cardiovascular: Negative for chest pain and palpitations.   Gastrointestinal: Negative for constipation, diarrhea, nausea and vomiting.   Genitourinary: Negative for dysuria and hematuria.   Musculoskeletal: Positive for back pain and gait problem. Negative for arthralgias, myalgias and neck stiffness.   Skin: Negative for pallor and rash.   Neurological: Negative for seizures, syncope and headaches.   Hematological: Negative for adenopathy.   Psychiatric/Behavioral:  "Negative for dysphoric mood. The patient is not nervous/anxious.        Objective:   /70   Pulse 74   Temp 97.1 °F (36.2 °C)   Ht 6' 3" (1.905 m)   Wt 105 kg (231 lb 7.7 oz)   BMI 28.93 kg/m²      Physical Exam   Constitutional: He is oriented to person, place, and time. He appears well-developed and well-nourished. No distress.   HENT:   Head: Normocephalic and atraumatic.   Mouth/Throat: Oropharynx is clear and moist.   Eyes: Pupils are equal, round, and reactive to light. EOM are normal.   Neck: Normal range of motion. Neck supple. No JVD present. No thyromegaly present.   Cardiovascular: Normal rate, regular rhythm, normal heart sounds and intact distal pulses. Exam reveals no gallop and no friction rub.   No murmur heard.  Pulmonary/Chest: Effort normal and breath sounds normal. He has no wheezes. He has no rales.   Abdominal: Soft. Bowel sounds are normal. He exhibits no distension. There is no tenderness. There is no rebound and no guarding.   Musculoskeletal: Normal range of motion. He exhibits no edema.   Lymphadenopathy:     He has no cervical adenopathy.   Neurological: He is alert and oriented to person, place, and time. He has normal reflexes. No cranial nerve deficit.   Skin: Skin is warm and dry. No rash noted.   Psychiatric: He has a normal mood and affect. Judgment normal.   Vitals reviewed.          Assessment:       1. Preop exam for internal medicine    2. Lumbar spondylosis    3. Combined hyperlipidemia associated with type 2 diabetes mellitus    4. Essential hypertension    5. Coronary artery disease, occlusive    6. CHARLES (obstructive sleep apnea)    7. Type 2 diabetes mellitus with diabetic polyneuropathy, without long-term current use of insulin        Plan:   No problem-specific Assessment & Plan notes found for this encounter.    Preop exam for internal medicine  -     CBC auto differential; Future; Expected date: 07/31/2019  -     Comprehensive metabolic panel; Future; Expected " date: 07/31/2019  -     Protime-INR; Future; Expected date: 07/31/2019  -     Urinalysis  -     APTT; Future; Expected date: 07/31/2019  -     X-Ray Chest PA And Lateral; Future; Expected date: 07/31/2019    Lumbar spondylosis    Combined hyperlipidemia associated with type 2 diabetes mellitus    Essential hypertension    Coronary artery disease, occlusive    CHARLES (obstructive sleep apnea)    Type 2 diabetes mellitus with diabetic polyneuropathy, without long-term current use of insulin     I reviewed the patient's past medical history and physical exam findings.  I make the following recommendations in regards to his surgical risk.    From a cardiac standpoint he has been evaluated by his cardiologist and been cleared to proceed without further workup at this time.  I defer cardiac evaluation to that provider.    From pulmonary standpoint the patient presents as a good candidate for surgery.  Good pulmonary toilet, incentive spirometry, early ambulation are all recommended to maximize pulmonary outcome.    DVT prophylaxis as per standard.    In regards to his diabetes his sugars have been well controlled.  He is indicated that the surgeon requested he be off of his metformin.  Given his overall good control of his sugars I am fine with that decision.  Monitoring of the blood sugars perioperatively and postoperatively will be important to make sure they remain under good control.  Most recent A1c was 5.8.    Sleep apnea may present with some postoperative apneic episodes in the recovery suite and if he is prolonged hospitalization.  The use of BiPAP or CPAP to support that is recommended.    Lab work and chest x-ray requested by the surgical team is pending at this time.  We will forward those results upon the receipt.    If there is anything further I can do to assist in this patient's care please do not hesitate to contact.      Follow up if symptoms worsen or fail to improve.     Addendum    Labs have been  reviewed,    1. Chronic stable anemia.  Recommend having blood products available if expected to have significant bleeding associated with the surgical procedure.  2. Chronic stage 3 CKD.  Monitor hydration status and anesthesia to be aware for appropriate medication adjustments.    Yeison Wilder MD

## 2019-08-02 RX ORDER — GLIPIZIDE 5 MG/1
10 TABLET ORAL 2 TIMES DAILY WITH MEALS
Qty: 360 TABLET | Refills: 4 | Status: SHIPPED | OUTPATIENT
Start: 2019-08-02 | End: 2020-11-18 | Stop reason: ALTCHOICE

## 2019-08-09 RX ORDER — PRAVASTATIN SODIUM 40 MG/1
TABLET ORAL
Qty: 90 TABLET | Refills: 3 | Status: SHIPPED | OUTPATIENT
Start: 2019-08-09 | End: 2020-08-11

## 2019-08-12 ENCOUNTER — TELEPHONE (OUTPATIENT)
Dept: INTERNAL MEDICINE | Facility: CLINIC | Age: 79
End: 2019-08-12

## 2019-08-12 NOTE — TELEPHONE ENCOUNTER
----- Message from Ana Ponce sent at 8/12/2019  1:30 PM CDT -----  Contact: Dr.Mitchell linn/ [facility]  Please fax the pt pre op results to [phone], can be reached at [phone]///[id number]

## 2019-08-13 ENCOUNTER — TELEPHONE (OUTPATIENT)
Dept: INTERNAL MEDICINE | Facility: CLINIC | Age: 79
End: 2019-08-13

## 2019-08-13 NOTE — TELEPHONE ENCOUNTER
Received call from Shivani with Dr. Iqbal office stating that they need the pre op clearance note from Dr. Wilder.  Faxed note to [phone].

## 2019-08-14 ENCOUNTER — TELEPHONE (OUTPATIENT)
Dept: INTERNAL MEDICINE | Facility: CLINIC | Age: 79
End: 2019-08-14

## 2019-08-14 NOTE — TELEPHONE ENCOUNTER
----- Message from Sonia Galvez sent at 8/14/2019 10:32 AM CDT -----  Contact: Dr narvaez  Type:  Needs Medical Advice    Who Called: yesika   Symptoms (please be specific):   How long has patient had these symptoms:   Pharmacy name and phone #:    Would the patient rather a call back or a response via [facility]? Call   Best Call Back Number: [phone]  Additional Information:caller is requesting a call back from the nurse in regards to the pt surgery please

## 2019-08-27 ENCOUNTER — EXTERNAL HOME HEALTH (OUTPATIENT)
Dept: HOME HEALTH SERVICES | Facility: HOSPITAL | Age: 79
End: 2019-08-27

## 2019-09-03 ENCOUNTER — TELEPHONE (OUTPATIENT)
Dept: INTERNAL MEDICINE | Facility: CLINIC | Age: 79
End: 2019-09-03

## 2019-09-03 NOTE — TELEPHONE ENCOUNTER
----- Message from Justine Joaquin sent at 9/3/2019  2:08 PM CDT -----  Contact: pt  Type:  Sooner Apoointment Request    Caller is requesting a sooner appointment.  Caller declined first available appointment listed below.  Caller will not accept being placed on the waitlist and is requesting a message be sent to doctor.  Name of Caller:Patient  When is the first available appointment?10/10  Symptoms:hosptial follow up within 1wk  Would the patient rather a call back or a response [facility]? Call back  Best Call Back Number:[phone]  Additional Information: pt was discharge Sunday

## 2019-09-04 ENCOUNTER — LAB VISIT (OUTPATIENT)
Dept: LAB | Facility: HOSPITAL | Age: 79
End: 2019-09-04
Payer: MEDICARE

## 2019-09-04 ENCOUNTER — TELEPHONE (OUTPATIENT)
Dept: INTERNAL MEDICINE | Facility: CLINIC | Age: 79
End: 2019-09-04

## 2019-09-04 DIAGNOSIS — N39.0 URINARY TRACT INFECTION, SITE NOT SPECIFIED: Primary | ICD-10-CM

## 2019-09-04 DIAGNOSIS — R30.0 DYSURIA: Primary | ICD-10-CM

## 2019-09-04 LAB
BACTERIA #/AREA URNS HPF: ABNORMAL /HPF
BILIRUB UR QL STRIP: NEGATIVE
CLARITY UR: ABNORMAL
COLOR UR: YELLOW
GLUCOSE UR QL STRIP: NEGATIVE
HGB UR QL STRIP: ABNORMAL
HYALINE CASTS #/AREA URNS LPF: 0 /LPF
KETONES UR QL STRIP: NEGATIVE
LEUKOCYTE ESTERASE UR QL STRIP: ABNORMAL
MICROSCOPIC COMMENT: ABNORMAL
NITRITE UR QL STRIP: NEGATIVE
PH UR STRIP: 7 [PH] (ref 5–8)
PROT UR QL STRIP: ABNORMAL
RBC #/AREA URNS HPF: >100 /HPF (ref 0–4)
SP GR UR STRIP: 1.01 (ref 1–1.03)
SQUAMOUS #/AREA URNS HPF: 1 /HPF
URN SPEC COLLECT METH UR: ABNORMAL
WBC #/AREA URNS HPF: >100 /HPF (ref 0–5)

## 2019-09-04 PROCEDURE — 87077 CULTURE AEROBIC IDENTIFY: CPT | Mod: HCNC

## 2019-09-04 PROCEDURE — 87086 URINE CULTURE/COLONY COUNT: CPT | Mod: HCNC

## 2019-09-04 PROCEDURE — 87186 SC STD MICRODIL/AGAR DIL: CPT | Mod: 59,HCNC

## 2019-09-04 PROCEDURE — 87088 URINE BACTERIA CULTURE: CPT | Mod: HCNC

## 2019-09-04 PROCEDURE — 81000 URINALYSIS NONAUTO W/SCOPE: CPT | Mod: HCNC

## 2019-09-04 NOTE — TELEPHONE ENCOUNTER
HH nurse requesting UA order. Pt c/o dysuria. States urgent. Sending message to Dr. Palma due to urgency expressed in message.

## 2019-09-04 NOTE — TELEPHONE ENCOUNTER
----- Message from Anny Giron sent at 9/4/2019 10:01 AM CDT -----  Contact: [facility]  needs urinalysis order, patient has been having frequent urination & burning....[phone] (urgent per caller)

## 2019-09-05 ENCOUNTER — TELEPHONE (OUTPATIENT)
Dept: INTERNAL MEDICINE | Facility: CLINIC | Age: 79
End: 2019-09-05

## 2019-09-05 RX ORDER — CIPROFLOXACIN 500 MG/1
500 TABLET ORAL EVERY 12 HOURS
Qty: 5 TABLET | Refills: 0 | Status: SHIPPED | OUTPATIENT
Start: 2019-09-05 | End: 2019-09-12 | Stop reason: ALTCHOICE

## 2019-09-05 NOTE — TELEPHONE ENCOUNTER
Patient was informed of his results via phone.  Patient verbalized understanding.  Patient verbalized understanding of prescription being sent to the pharmacy.

## 2019-09-06 ENCOUNTER — TELEPHONE (OUTPATIENT)
Dept: HOME HEALTH SERVICES | Facility: HOSPITAL | Age: 79
End: 2019-09-06

## 2019-09-06 ENCOUNTER — OFFICE VISIT (OUTPATIENT)
Dept: INTERNAL MEDICINE | Facility: CLINIC | Age: 79
End: 2019-09-06
Payer: MEDICARE

## 2019-09-06 VITALS
SYSTOLIC BLOOD PRESSURE: 126 MMHG | HEIGHT: 75 IN | TEMPERATURE: 99 F | DIASTOLIC BLOOD PRESSURE: 60 MMHG | OXYGEN SATURATION: 98 % | HEART RATE: 80 BPM | BODY MASS INDEX: 28.93 KG/M2

## 2019-09-06 DIAGNOSIS — N39.0 URINARY TRACT INFECTION ASSOCIATED WITH CATHETERIZATION OF URINARY TRACT, UNSPECIFIED INDWELLING URINARY CATHETER TYPE, SEQUELA: ICD-10-CM

## 2019-09-06 DIAGNOSIS — R30.0 DYSURIA: Primary | ICD-10-CM

## 2019-09-06 DIAGNOSIS — T83.511S URINARY TRACT INFECTION ASSOCIATED WITH CATHETERIZATION OF URINARY TRACT, UNSPECIFIED INDWELLING URINARY CATHETER TYPE, SEQUELA: ICD-10-CM

## 2019-09-06 LAB
BACTERIA #/AREA URNS HPF: ABNORMAL /HPF
BILIRUB UR QL STRIP: ABNORMAL
CLARITY UR: ABNORMAL
COLOR UR: YELLOW
GLUCOSE UR QL STRIP: NEGATIVE
HGB UR QL STRIP: ABNORMAL
HYALINE CASTS #/AREA URNS LPF: 0 /LPF
KETONES UR QL STRIP: NEGATIVE
LEUKOCYTE ESTERASE UR QL STRIP: ABNORMAL
MICROSCOPIC COMMENT: ABNORMAL
NITRITE UR QL STRIP: POSITIVE
PH UR STRIP: 6.5 [PH] (ref 5–8)
PROT UR QL STRIP: ABNORMAL
RBC #/AREA URNS HPF: 5 /HPF (ref 0–4)
SP GR UR STRIP: 1.01 (ref 1–1.03)
SQUAMOUS #/AREA URNS HPF: 3 /HPF
URN SPEC COLLECT METH UR: ABNORMAL
WBC #/AREA URNS HPF: >100 /HPF (ref 0–5)
WBC CLUMPS URNS QL MICRO: ABNORMAL

## 2019-09-06 PROCEDURE — 99999 PR PBB SHADOW E&M-EST. PATIENT-LVL IV: ICD-10-PCS | Mod: PBBFAC,HCNC,, | Performed by: PHYSICIAN ASSISTANT

## 2019-09-06 PROCEDURE — 99999 PR PBB SHADOW E&M-EST. PATIENT-LVL IV: CPT | Mod: PBBFAC,HCNC,, | Performed by: PHYSICIAN ASSISTANT

## 2019-09-06 PROCEDURE — 87086 URINE CULTURE/COLONY COUNT: CPT | Mod: HCNC

## 2019-09-06 PROCEDURE — 99214 OFFICE O/P EST MOD 30 MIN: CPT | Mod: HCNC,S$GLB,, | Performed by: PHYSICIAN ASSISTANT

## 2019-09-06 PROCEDURE — 3078F DIAST BP <80 MM HG: CPT | Mod: HCNC,CPTII,S$GLB, | Performed by: PHYSICIAN ASSISTANT

## 2019-09-06 PROCEDURE — 3078F PR MOST RECENT DIASTOLIC BLOOD PRESSURE < 80 MM HG: ICD-10-PCS | Mod: HCNC,CPTII,S$GLB, | Performed by: PHYSICIAN ASSISTANT

## 2019-09-06 PROCEDURE — 81000 URINALYSIS NONAUTO W/SCOPE: CPT | Mod: HCNC

## 2019-09-06 PROCEDURE — 3074F SYST BP LT 130 MM HG: CPT | Mod: HCNC,CPTII,S$GLB, | Performed by: PHYSICIAN ASSISTANT

## 2019-09-06 PROCEDURE — 3074F PR MOST RECENT SYSTOLIC BLOOD PRESSURE < 130 MM HG: ICD-10-PCS | Mod: HCNC,CPTII,S$GLB, | Performed by: PHYSICIAN ASSISTANT

## 2019-09-06 PROCEDURE — 99214 PR OFFICE/OUTPT VISIT, EST, LEVL IV, 30-39 MIN: ICD-10-PCS | Mod: HCNC,S$GLB,, | Performed by: PHYSICIAN ASSISTANT

## 2019-09-06 PROCEDURE — 1101F PT FALLS ASSESS-DOCD LE1/YR: CPT | Mod: HCNC,CPTII,S$GLB, | Performed by: PHYSICIAN ASSISTANT

## 2019-09-06 PROCEDURE — 1101F PR PT FALLS ASSESS DOC 0-1 FALLS W/OUT INJ PAST YR: ICD-10-PCS | Mod: HCNC,CPTII,S$GLB, | Performed by: PHYSICIAN ASSISTANT

## 2019-09-06 RX ORDER — BISACODYL 10 MG
SUPPOSITORY, RECTAL RECTAL
Refills: 0 | COMMUNITY
Start: 2019-09-01

## 2019-09-06 RX ORDER — LOSARTAN POTASSIUM 50 MG/1
TABLET ORAL
Refills: 0 | COMMUNITY
Start: 2019-09-01 | End: 2019-09-12 | Stop reason: SDUPTHER

## 2019-09-06 RX ORDER — PHENAZOPYRIDINE HYDROCHLORIDE 200 MG/1
200 TABLET, FILM COATED ORAL 3 TIMES DAILY PRN
Qty: 20 TABLET | Refills: 0 | Status: SHIPPED | OUTPATIENT
Start: 2019-09-06 | End: 2019-09-12

## 2019-09-06 NOTE — PROGRESS NOTES
Subjective:       Patient ID: Srinath Mcknight is a 78 y.o. male.    Chief Complaint: painful urination (PCP - Meño)    Dysuria    This is a new problem. The problem has been unchanged. The quality of the pain is described as burning. The pain is moderate. There has been no fever. Associated symptoms include frequency and urgency. Pertinent negatives include no chills. Treatments tried: Has been on cipro for 2 doses  His past medical history is significant for catheterization.     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   Respiratory: Negative for chest tightness and shortness of breath.    Cardiovascular: Negative for chest pain.   Gastrointestinal: Negative for abdominal pain.   Genitourinary: Positive for dysuria, frequency and urgency.       Objective:      Physical Exam   Constitutional: He appears well-developed and well-nourished. No distress.   Cardiovascular: Normal rate and regular rhythm.   Pulmonary/Chest: Effort normal and breath sounds normal.   Abdominal: Soft. There is tenderness.   Skin: He is not diaphoretic.   Nursing note and vitals reviewed.      Assessment:       1. Dysuria    2. Urinary tract infection associated with catheterization of urinary tract, unspecified indwelling urinary catheter  type, sequela        Plan:       Dysuria  -     Urinalysis  -     Urine culture    Urinary tract infection associated with catheterization of urinary tract, unspecified indwelling urinary catheter type, sequela  Continue with antibiotics until gone. Increase water intake. May take tylenol as needed for fever. If your urine culture shows antibiotic resistance, we will notify you. Go to the Emergency Room if your symptoms become much worse. Otherwise, follow up with you PCP as scheduled.   Other orders  -     Urinalysis Microscopic  -     phenazopyridine (PYRIDIUM) 200 MG tablet; Take 1 tablet (200 mg total) by mouth 3 (three) times daily as needed for Pain.  Dispense: 20 tablet; Refill: 0

## 2019-09-07 LAB
BACTERIA UR CULT: ABNORMAL
BACTERIA UR CULT: ABNORMAL
BACTERIA UR CULT: NO GROWTH

## 2019-09-09 ENCOUNTER — TELEPHONE (OUTPATIENT)
Dept: HOME HEALTH SERVICES | Facility: HOSPITAL | Age: 79
End: 2019-09-09

## 2019-09-11 ENCOUNTER — TELEPHONE (OUTPATIENT)
Dept: INTERNAL MEDICINE | Facility: CLINIC | Age: 79
End: 2019-09-11

## 2019-09-11 NOTE — TELEPHONE ENCOUNTER
Can you look into this urine.  It looks like Filipe ordered it, but it is positive.  Can you send for treatment?

## 2019-09-11 NOTE — TELEPHONE ENCOUNTER
----- Message from Delaney Umaña sent at 9/11/2019  3:40 PM CDT -----  Contact: [facility] is calling regarding the Positive test results on urinalysis and Medication. If their is a medication  sent in for UTI. Nurse [phone]           .Thank You  Delaney Umaña

## 2019-09-11 NOTE — TELEPHONE ENCOUNTER
Spoke with pt regarding UTI symptoms. Per pt he has completed the Cipro antibiotic that was sent to the pharmacy by Dr. Wilder but he did not  the pyridium that was sent in by Mr. Dent because his insurance did not cover it. Pt stated that he saw Mr. Dent before completing his Cipro because he was not having any relief when he initially started it. Pt stated that he is now feeling better since he has completed the medication, he stated that he does not have anymore burning during urination or having frequent urination. Pt has an appointment tomorrow with Dr. Wilder and is aware of appointment time.

## 2019-09-11 NOTE — TELEPHONE ENCOUNTER
cipro was sent in on 9/5 based on the urinalysis from 9/4.  The urine Pasman did was on 9/6.  The patietn should be on cipro at this time for the infection.

## 2019-09-12 ENCOUNTER — LAB VISIT (OUTPATIENT)
Dept: LAB | Facility: HOSPITAL | Age: 79
End: 2019-09-12
Attending: INTERNAL MEDICINE
Payer: MEDICARE

## 2019-09-12 ENCOUNTER — OFFICE VISIT (OUTPATIENT)
Dept: INTERNAL MEDICINE | Facility: CLINIC | Age: 79
End: 2019-09-12
Payer: MEDICARE

## 2019-09-12 VITALS
TEMPERATURE: 98 F | HEIGHT: 75 IN | SYSTOLIC BLOOD PRESSURE: 124 MMHG | BODY MASS INDEX: 27.93 KG/M2 | DIASTOLIC BLOOD PRESSURE: 66 MMHG | HEART RATE: 76 BPM | WEIGHT: 224.63 LBS

## 2019-09-12 DIAGNOSIS — E11.42 TYPE 2 DIABETES MELLITUS WITH DIABETIC POLYNEUROPATHY, WITHOUT LONG-TERM CURRENT USE OF INSULIN: Chronic | ICD-10-CM

## 2019-09-12 DIAGNOSIS — D64.9 ANEMIA DUE TO UNKNOWN MECHANISM: ICD-10-CM

## 2019-09-12 DIAGNOSIS — Z98.1 STATUS POST LUMBAR SPINAL FUSION: ICD-10-CM

## 2019-09-12 DIAGNOSIS — K91.89 ILEUS, POSTOPERATIVE: Primary | ICD-10-CM

## 2019-09-12 DIAGNOSIS — R35.0 URINARY FREQUENCY: ICD-10-CM

## 2019-09-12 DIAGNOSIS — K56.7 ILEUS, POSTOPERATIVE: Primary | ICD-10-CM

## 2019-09-12 DIAGNOSIS — D64.9 CHRONIC ANEMIA: ICD-10-CM

## 2019-09-12 LAB
ALBUMIN SERPL BCP-MCNC: 3.5 G/DL (ref 3.5–5.2)
ALP SERPL-CCNC: 96 U/L (ref 55–135)
ALT SERPL W/O P-5'-P-CCNC: 21 U/L (ref 10–44)
ANION GAP SERPL CALC-SCNC: 10 MMOL/L (ref 8–16)
AST SERPL-CCNC: 19 U/L (ref 10–40)
BASOPHILS # BLD AUTO: 0.03 K/UL (ref 0–0.2)
BASOPHILS NFR BLD: 0.5 % (ref 0–1.9)
BILIRUB SERPL-MCNC: 0.4 MG/DL (ref 0.1–1)
BILIRUB UR QL STRIP: NEGATIVE
BUN SERPL-MCNC: 12 MG/DL (ref 8–23)
CALCIUM SERPL-MCNC: 9.1 MG/DL (ref 8.7–10.5)
CHLORIDE SERPL-SCNC: 107 MMOL/L (ref 95–110)
CLARITY UR REFRACT.AUTO: ABNORMAL
CO2 SERPL-SCNC: 27 MMOL/L (ref 23–29)
COLOR UR AUTO: YELLOW
CREAT SERPL-MCNC: 1.3 MG/DL (ref 0.5–1.4)
DIFFERENTIAL METHOD: ABNORMAL
EOSINOPHIL # BLD AUTO: 0.5 K/UL (ref 0–0.5)
EOSINOPHIL NFR BLD: 8.7 % (ref 0–8)
ERYTHROCYTE [DISTWIDTH] IN BLOOD BY AUTOMATED COUNT: 15.9 % (ref 11.5–14.5)
EST. GFR  (AFRICAN AMERICAN): >60 ML/MIN/1.73 M^2
EST. GFR  (NON AFRICAN AMERICAN): 53 ML/MIN/1.73 M^2
FERRITIN SERPL-MCNC: 61 NG/ML (ref 20–300)
GLUCOSE SERPL-MCNC: 65 MG/DL (ref 70–110)
GLUCOSE UR QL STRIP: NEGATIVE
HCT VFR BLD AUTO: 30 % (ref 40–54)
HGB BLD-MCNC: 9.7 G/DL (ref 14–18)
HGB UR QL STRIP: NEGATIVE
IMM GRANULOCYTES # BLD AUTO: 0.06 K/UL (ref 0–0.04)
IMM GRANULOCYTES NFR BLD AUTO: 1 % (ref 0–0.5)
IRON SERPL-MCNC: 59 UG/DL (ref 45–160)
KETONES UR QL STRIP: NEGATIVE
LEUKOCYTE ESTERASE UR QL STRIP: NEGATIVE
LYMPHOCYTES # BLD AUTO: 1.9 K/UL (ref 1–4.8)
LYMPHOCYTES NFR BLD: 31.5 % (ref 18–48)
MCH RBC QN AUTO: 28.7 PG (ref 27–31)
MCHC RBC AUTO-ENTMCNC: 32.3 G/DL (ref 32–36)
MCV RBC AUTO: 89 FL (ref 82–98)
MICROSCOPIC COMMENT: NORMAL
MONOCYTES # BLD AUTO: 0.6 K/UL (ref 0.3–1)
MONOCYTES NFR BLD: 10.3 % (ref 4–15)
NEUTROPHILS # BLD AUTO: 2.9 K/UL (ref 1.8–7.7)
NEUTROPHILS NFR BLD: 48 % (ref 38–73)
NITRITE UR QL STRIP: NEGATIVE
NRBC BLD-RTO: 0 /100 WBC
PH UR STRIP: 7 [PH] (ref 5–8)
PLATELET # BLD AUTO: 368 K/UL (ref 150–350)
PMV BLD AUTO: 9.9 FL (ref 9.2–12.9)
POTASSIUM SERPL-SCNC: 4.7 MMOL/L (ref 3.5–5.1)
PROT SERPL-MCNC: 6.9 G/DL (ref 6–8.4)
PROT UR QL STRIP: NEGATIVE
RBC # BLD AUTO: 3.38 M/UL (ref 4.6–6.2)
RBC #/AREA URNS AUTO: 0 /HPF (ref 0–4)
SATURATED IRON: 21 % (ref 20–50)
SODIUM SERPL-SCNC: 144 MMOL/L (ref 136–145)
SP GR UR STRIP: 1.01 (ref 1–1.03)
SQUAMOUS #/AREA URNS AUTO: 0 /HPF
TOTAL IRON BINDING CAPACITY: 284 UG/DL (ref 250–450)
TRANSFERRIN SERPL-MCNC: 192 MG/DL (ref 200–375)
URN SPEC COLLECT METH UR: ABNORMAL
WBC # BLD AUTO: 6 K/UL (ref 3.9–12.7)
WBC #/AREA URNS AUTO: 0 /HPF (ref 0–5)

## 2019-09-12 PROCEDURE — 86334 PATHOLOGIST INTERPRETATION IFE: ICD-10-PCS | Mod: 26,HCNC,, | Performed by: PATHOLOGY

## 2019-09-12 PROCEDURE — 86334 IMMUNOFIX E-PHORESIS SERUM: CPT | Mod: 26,HCNC,, | Performed by: PATHOLOGY

## 2019-09-12 PROCEDURE — 3074F SYST BP LT 130 MM HG: CPT | Mod: HCNC,CPTII,S$GLB, | Performed by: INTERNAL MEDICINE

## 2019-09-12 PROCEDURE — 82728 ASSAY OF FERRITIN: CPT | Mod: HCNC

## 2019-09-12 PROCEDURE — 99999 PR PBB SHADOW E&M-EST. PATIENT-LVL III: CPT | Mod: PBBFAC,HCNC,, | Performed by: INTERNAL MEDICINE

## 2019-09-12 PROCEDURE — 99999 PR PBB SHADOW E&M-EST. PATIENT-LVL III: ICD-10-PCS | Mod: PBBFAC,HCNC,, | Performed by: INTERNAL MEDICINE

## 2019-09-12 PROCEDURE — 86334 IMMUNOFIX E-PHORESIS SERUM: CPT | Mod: HCNC

## 2019-09-12 PROCEDURE — 85025 COMPLETE CBC W/AUTO DIFF WBC: CPT | Mod: HCNC

## 2019-09-12 PROCEDURE — 99499 UNLISTED E&M SERVICE: CPT | Mod: HCNC,S$GLB,, | Performed by: INTERNAL MEDICINE

## 2019-09-12 PROCEDURE — 84165 PROTEIN E-PHORESIS SERUM: CPT | Mod: HCNC

## 2019-09-12 PROCEDURE — 83540 ASSAY OF IRON: CPT | Mod: HCNC

## 2019-09-12 PROCEDURE — 99214 OFFICE O/P EST MOD 30 MIN: CPT | Mod: 25,HCNC,S$GLB, | Performed by: INTERNAL MEDICINE

## 2019-09-12 PROCEDURE — 90662 FLU VACCINE - HIGH DOSE (65+) PRESERVATIVE FREE IM: ICD-10-PCS | Mod: HCNC,S$GLB,, | Performed by: INTERNAL MEDICINE

## 2019-09-12 PROCEDURE — 1101F PR PT FALLS ASSESS DOC 0-1 FALLS W/OUT INJ PAST YR: ICD-10-PCS | Mod: HCNC,CPTII,S$GLB, | Performed by: INTERNAL MEDICINE

## 2019-09-12 PROCEDURE — 83520 IMMUNOASSAY QUANT NOS NONAB: CPT | Mod: HCNC

## 2019-09-12 PROCEDURE — 90662 IIV NO PRSV INCREASED AG IM: CPT | Mod: HCNC,S$GLB,, | Performed by: INTERNAL MEDICINE

## 2019-09-12 PROCEDURE — 99499 RISK ADDL DX/OHS AUDIT: ICD-10-PCS | Mod: HCNC,S$GLB,, | Performed by: INTERNAL MEDICINE

## 2019-09-12 PROCEDURE — 99214 PR OFFICE/OUTPT VISIT, EST, LEVL IV, 30-39 MIN: ICD-10-PCS | Mod: 25,HCNC,S$GLB, | Performed by: INTERNAL MEDICINE

## 2019-09-12 PROCEDURE — 84165 PROTEIN E-PHORESIS SERUM: CPT | Mod: 26,HCNC,, | Performed by: PATHOLOGY

## 2019-09-12 PROCEDURE — 1101F PT FALLS ASSESS-DOCD LE1/YR: CPT | Mod: HCNC,CPTII,S$GLB, | Performed by: INTERNAL MEDICINE

## 2019-09-12 PROCEDURE — 81001 URINALYSIS AUTO W/SCOPE: CPT | Mod: HCNC

## 2019-09-12 PROCEDURE — 36415 COLL VENOUS BLD VENIPUNCTURE: CPT | Mod: HCNC,PO

## 2019-09-12 PROCEDURE — 3074F PR MOST RECENT SYSTOLIC BLOOD PRESSURE < 130 MM HG: ICD-10-PCS | Mod: HCNC,CPTII,S$GLB, | Performed by: INTERNAL MEDICINE

## 2019-09-12 PROCEDURE — G0008 ADMIN INFLUENZA VIRUS VAC: HCPCS | Mod: HCNC,S$GLB,, | Performed by: INTERNAL MEDICINE

## 2019-09-12 PROCEDURE — 80053 COMPREHEN METABOLIC PANEL: CPT | Mod: HCNC

## 2019-09-12 PROCEDURE — G0008 FLU VACCINE - HIGH DOSE (65+) PRESERVATIVE FREE IM: ICD-10-PCS | Mod: HCNC,S$GLB,, | Performed by: INTERNAL MEDICINE

## 2019-09-12 PROCEDURE — 3078F DIAST BP <80 MM HG: CPT | Mod: HCNC,CPTII,S$GLB, | Performed by: INTERNAL MEDICINE

## 2019-09-12 PROCEDURE — 3078F PR MOST RECENT DIASTOLIC BLOOD PRESSURE < 80 MM HG: ICD-10-PCS | Mod: HCNC,CPTII,S$GLB, | Performed by: INTERNAL MEDICINE

## 2019-09-12 PROCEDURE — 84165 PATHOLOGIST INTERPRETATION SPE: ICD-10-PCS | Mod: 26,HCNC,, | Performed by: PATHOLOGY

## 2019-09-12 RX ORDER — FINASTERIDE 5 MG/1
5 TABLET, FILM COATED ORAL DAILY
Qty: 30 TABLET | Refills: 6 | Status: SHIPPED | OUTPATIENT
Start: 2019-09-12 | End: 2022-09-20 | Stop reason: ALTCHOICE

## 2019-09-12 RX ORDER — LOSARTAN POTASSIUM 50 MG/1
50 TABLET ORAL DAILY
Qty: 90 TABLET | Refills: 4 | Status: SHIPPED | OUTPATIENT
Start: 2019-09-12 | End: 2020-11-18

## 2019-09-12 NOTE — PROGRESS NOTES
Subjective:       Patient ID: Srinath Mcknight is a 78 y.o. male.    Chief Complaint: Hospital Follow Up ( General )    HPI Patient is a 78-year-old male presenting today after recent hospitalization.  He was hospitalized at the [facility] for a lumbar fusion.  He did well through surgery then postoperatively he developed abdominal pain and was readmitted with an ileus felt to be associated with pain medications.  The ileus was dealt with conservatively and with was improved at time of discharge. He indicates his bowels are moving well at this point.    Since discharge he did develop urinary frequency and burning.  He noticed he had urinary frequency from the time that he got his catheter out the hospital but the burning was new.  Urinalysis showed infection.  He was placed on antibiotics and treated for the infection.  His burning and dysuria resolved.  He does have continued frequency.  He has had a history of BPH and he is on Flomax.  He states that he is having frequency as well as difficulty getting to the bathroom in time.    From the fusion standpoint he indicates he is doing well.  The pain is significantly improved.  He has not started full for physical therapy at this time he is doing home health PT.  He has follow-up with his surgeon today.    He is a type 2 diabetic.  He is on medications.  He states that his sugars have done well throughout this operative course in the surgical timeframe.  He has not experienced any significant swings in the sugars or problems with sugars.    Review of Systems   Constitutional: Negative for fever and unexpected weight change.   Respiratory: Negative for cough, shortness of breath and wheezing.    Cardiovascular: Negative for chest pain and palpitations.   Gastrointestinal: Negative for constipation, diarrhea, nausea and vomiting.   Genitourinary: Negative for dysuria and hematuria.       Objective:   /66 (BP Location: Left arm, Patient Position:  "Sitting, BP Method: X-Large (Manual))   Pulse 76   Temp 97.9 °F (36.6 °C) (Oral)   Ht 6' 3" (1.905 m)   Wt 101.9 kg (224 lb 10.4 oz)   BMI 28.08 kg/m²      Physical Exam   Constitutional: He appears well-developed and well-nourished.   HENT:   Head: Normocephalic and atraumatic.   Eyes: Pupils are equal, round, and reactive to light.   Neck: Neck supple. No thyromegaly present.   Cardiovascular: Normal rate, regular rhythm and normal heart sounds. Exam reveals no gallop and no friction rub.   No murmur heard.  Pulmonary/Chest: Breath sounds normal. He has no wheezes. He has no rales.   Abdominal: Soft. Bowel sounds are normal. He exhibits no distension. There is no tenderness.   Vitals reviewed.      Office Visit on 09/06/2019   Component Date Value    Specimen UA 09/06/2019 Urine, Clean Catch     Color, UA 09/06/2019 Yellow     Appearance, UA 09/06/2019 Cloudy*    pH, UA 09/06/2019 6.5     Specific [place], UA 09/06/2019 1.010     Protein, UA 09/06/2019 1+*    Glucose, UA 09/06/2019 Negative     Ketones, UA 09/06/2019 Negative     Bilirubin (UA) 09/06/2019 1+*    Occult Blood UA 09/06/2019 2+*    Nitrite, UA 09/06/2019 Positive*    Leukocytes, UA 09/06/2019 3+*    Urine Culture, Routine 09/06/2019 No growth     RBC, UA 09/06/2019 5*    WBC, UA 09/06/2019 >100*    WBC Clumps, UA 09/06/2019 Occasional*    Bacteria 09/06/2019 Few*    Squam Epithel, UA 09/06/2019 3     Hyaline Casts, UA 09/06/2019 0     Microscopic Comment 09/06/2019 SEE COMMENT    Lab Visit on 09/04/2019   Component Date Value    Specimen UA 09/04/2019 Urine, Clean Catch     Color, UA 09/04/2019 Yellow     Appearance, UA 09/04/2019 Hazy*    pH, UA 09/04/2019 7.0     Specific Gravity, UA 09/04/2019 1.010     Protein, UA 09/04/2019 2+*    Glucose, UA 09/04/2019 Negative     Ketones, UA 09/04/2019 Negative     Bilirubin (UA) 09/04/2019 Negative     Occult Blood UA 09/04/2019 3+*    Nitrite, UA 09/04/2019 Negative     " Leukocytes, UA 09/04/2019 1+*    Urine Culture, Routine 09/04/2019 *                    Value:CITROBACTER KOSERI  > 100,000 cfu/ml      Urine Culture, Routine 09/04/2019 *                    Value:KLEBSIELLA PNEUMONIAE  10,000 - 49,999 cfu/ml      RBC, UA 09/04/2019 >100*    WBC, UA 09/04/2019 >100*    Bacteria 09/04/2019 Few*    Squam Epithel, UA 09/04/2019 1     Hyaline Casts, UA 09/04/2019 0     Microscopic Comment 09/04/2019 SEE COMMENT        Assessment:       1. Ileus, postoperative    2. Urinary frequency    3. Status post lumbar spinal fusion    4. Type 2 diabetes mellitus with diabetic polyneuropathy, without long-term current use of insulin        Plan:   No problem-specific Assessment & Plan notes found for this encounter.    Ileus, postoperative  Comments:  resolved after hospitalization.  Continue bowel regimen.    Urinary frequency  Comments:  repeat ua today.    Orders:  -     finasteride (PROSCAR) 5 mg tablet; Take 1 tablet (5 mg total) by mouth once daily.  Dispense: 30 tablet; Refill: 6  -     Urinalysis    Status post lumbar spinal fusion  Comments:  doing well post surgery.    Type 2 diabetes mellitus with diabetic polyneuropathy, without long-term current use of insulin  Comments:  stable, continue meds.    Other orders  -     losartan (COZAAR) 50 MG tablet; Take 1 tablet (50 mg total) by mouth once daily.  Dispense: 90 tablet; Refill: 4     I consulted with Dr. Amador in regards to the urinary symptoms.  He recommended we can try some finasteride although we are both in agreement will take a long time to have that impact.  The other option is certainly to have him follow up in the Urology Clinic for some flow studies and determination of his post ordered residual.  He may need intervention at some point.  Discuss these options with the patient and at this time he wants to try the medication and scheduled bathroom trips prior to going for more formal workup at this time.      Follow up  in about 4 weeks (around 10/10/2019) for Urinary frequency, dm, htn, with Dori Mckinnon PA-C.

## 2019-09-13 LAB
ALBUMIN SERPL ELPH-MCNC: 3.4 G/DL (ref 3.35–5.55)
ALPHA1 GLOB SERPL ELPH-MCNC: 0.41 G/DL (ref 0.17–0.41)
ALPHA2 GLOB SERPL ELPH-MCNC: 0.88 G/DL (ref 0.43–0.99)
B-GLOBULIN SERPL ELPH-MCNC: 0.73 G/DL (ref 0.5–1.1)
GAMMA GLOB SERPL ELPH-MCNC: 0.97 G/DL (ref 0.67–1.58)
INTERPRETATION SERPL IFE-IMP: NORMAL
KAPPA LC SER QL IA: 2.4 MG/DL (ref 0.33–1.94)
KAPPA LC/LAMBDA SER IA: 1.1 (ref 0.26–1.65)
LAMBDA LC SER QL IA: 2.19 MG/DL (ref 0.57–2.63)
PATHOLOGIST INTERPRETATION IFE: NORMAL
PATHOLOGIST INTERPRETATION SPE: NORMAL
PROT SERPL-MCNC: 6.4 G/DL (ref 6–8.4)

## 2019-09-26 ENCOUNTER — TELEPHONE (OUTPATIENT)
Dept: HOME HEALTH SERVICES | Facility: HOSPITAL | Age: 79
End: 2019-09-26

## 2019-10-01 ENCOUNTER — OFFICE VISIT (OUTPATIENT)
Dept: URGENT CARE | Facility: CLINIC | Age: 79
End: 2019-10-01
Payer: MEDICARE

## 2019-10-01 VITALS
HEIGHT: 75 IN | HEART RATE: 102 BPM | BODY MASS INDEX: 28.21 KG/M2 | DIASTOLIC BLOOD PRESSURE: 80 MMHG | TEMPERATURE: 98 F | OXYGEN SATURATION: 98 % | SYSTOLIC BLOOD PRESSURE: 140 MMHG | WEIGHT: 226.88 LBS

## 2019-10-01 DIAGNOSIS — R31.9 URINARY TRACT INFECTION WITH HEMATURIA, SITE UNSPECIFIED: Primary | ICD-10-CM

## 2019-10-01 DIAGNOSIS — N39.0 URINARY TRACT INFECTION WITH HEMATURIA, SITE UNSPECIFIED: Primary | ICD-10-CM

## 2019-10-01 DIAGNOSIS — R30.0 DYSURIA: ICD-10-CM

## 2019-10-01 LAB
BILIRUB SERPL-MCNC: NEGATIVE MG/DL
BLOOD URINE, POC: 50
COLOR, POC UA: NORMAL
GLUCOSE UR QL STRIP: NORMAL
KETONES UR QL STRIP: NEGATIVE
LEUKOCYTE ESTERASE URINE, POC: NORMAL
NITRITE, POC UA: POSITIVE
PH, POC UA: 5
PROTEIN, POC: 30
SPECIFIC GRAVITY, POC UA: 1.02
UROBILINOGEN, POC UA: NORMAL

## 2019-10-01 PROCEDURE — 3077F SYST BP >= 140 MM HG: CPT | Mod: HCNC,CPTII,S$GLB, | Performed by: NURSE PRACTITIONER

## 2019-10-01 PROCEDURE — 99214 PR OFFICE/OUTPT VISIT, EST, LEVL IV, 30-39 MIN: ICD-10-PCS | Mod: HCNC,S$GLB,, | Performed by: NURSE PRACTITIONER

## 2019-10-01 PROCEDURE — 87077 CULTURE AEROBIC IDENTIFY: CPT | Mod: HCNC

## 2019-10-01 PROCEDURE — 3077F PR MOST RECENT SYSTOLIC BLOOD PRESSURE >= 140 MM HG: ICD-10-PCS | Mod: HCNC,CPTII,S$GLB, | Performed by: NURSE PRACTITIONER

## 2019-10-01 PROCEDURE — 1101F PT FALLS ASSESS-DOCD LE1/YR: CPT | Mod: HCNC,CPTII,S$GLB, | Performed by: NURSE PRACTITIONER

## 2019-10-01 PROCEDURE — 3079F PR MOST RECENT DIASTOLIC BLOOD PRESSURE 80-89 MM HG: ICD-10-PCS | Mod: HCNC,CPTII,S$GLB, | Performed by: NURSE PRACTITIONER

## 2019-10-01 PROCEDURE — 99999 PR PBB SHADOW E&M-EST. PATIENT-LVL V: CPT | Mod: PBBFAC,HCNC,, | Performed by: NURSE PRACTITIONER

## 2019-10-01 PROCEDURE — 87086 URINE CULTURE/COLONY COUNT: CPT | Mod: HCNC

## 2019-10-01 PROCEDURE — 81002 URINALYSIS NONAUTO W/O SCOPE: CPT | Mod: HCNC,S$GLB,, | Performed by: NURSE PRACTITIONER

## 2019-10-01 PROCEDURE — 99214 OFFICE O/P EST MOD 30 MIN: CPT | Mod: HCNC,S$GLB,, | Performed by: NURSE PRACTITIONER

## 2019-10-01 PROCEDURE — 87186 SC STD MICRODIL/AGAR DIL: CPT | Mod: HCNC

## 2019-10-01 PROCEDURE — 87088 URINE BACTERIA CULTURE: CPT | Mod: HCNC

## 2019-10-01 PROCEDURE — 99999 PR PBB SHADOW E&M-EST. PATIENT-LVL V: ICD-10-PCS | Mod: PBBFAC,HCNC,, | Performed by: NURSE PRACTITIONER

## 2019-10-01 PROCEDURE — 1101F PR PT FALLS ASSESS DOC 0-1 FALLS W/OUT INJ PAST YR: ICD-10-PCS | Mod: HCNC,CPTII,S$GLB, | Performed by: NURSE PRACTITIONER

## 2019-10-01 PROCEDURE — 81002 POCT URINE DIPSTICK WITHOUT MICROSCOPE: ICD-10-PCS | Mod: HCNC,S$GLB,, | Performed by: NURSE PRACTITIONER

## 2019-10-01 PROCEDURE — 3079F DIAST BP 80-89 MM HG: CPT | Mod: HCNC,CPTII,S$GLB, | Performed by: NURSE PRACTITIONER

## 2019-10-01 RX ORDER — AMOXICILLIN AND CLAVULANATE POTASSIUM 875; 125 MG/1; MG/1
1 TABLET, FILM COATED ORAL 2 TIMES DAILY
Qty: 14 TABLET | Refills: 0 | Status: SHIPPED | OUTPATIENT
Start: 2019-10-01 | End: 2019-10-08

## 2019-10-01 NOTE — PATIENT INSTRUCTIONS
· Increase fluids (avoid caffeine or sugary beverages as these will irritate the bladder).   · Take your antibiotics exactly as prescribed and make sure to complete entire course even if you begin to feel better. This will prevent recurrence of infection and antibiotic resistance.   · We have prescribed an antibiotic that covers most bacteria that cause urinary tract infections, however, if your urine culture shows that you have any bacterial resistance to the antibiotic you were prescribed or an unusual bacterial strain, we will notify you and make any necessary changes. Urine cultures usually result in about three days.   · Please follow up with your primary care provider if your symptoms do not improve within 2-3 days or sooner for any new or worsening symptoms.  · For any severe pain, bright red blood in urine, difficulty urinating, fever that does not improve with Tylenol or Ibuprofen, inability to urinate, incontinence of urine or bowels, or for any other urgent concerns, please go to the ER for immediate evaluation.

## 2019-10-01 NOTE — PROGRESS NOTES
"Subjective:      Patient ID: Srinath Mcknight is a 78 y.o. male.    Chief Complaint: Urinary Tract Infection    BP (!) 140/80 (BP Location: Right arm, Patient Position: Sitting)   Pulse 102   Temp 97.7 °F (36.5 °C) (Oral)   Ht 6' 3" (1.905 m)   Wt 102.9 kg (226 lb 13.7 oz)   SpO2 98%   BMI 28.35 kg/m²     Urinary Tract Infection    This is a new problem. The current episode started in the past 7 days. The problem occurs every urination. The problem has been gradually worsening. The quality of the pain is described as burning. The pain is at a severity of 1/10. The pain is mild. There has been no fever. Associated symptoms include frequency and urgency. Pertinent negatives include no chills, discharge, flank pain, hematuria, nausea, vomiting or withholding. He has tried nothing for the symptoms.       Review of patient's allergies indicates:   Allergen Reactions    Sulfa (sulfonamide antibiotics)      Can't recall from 1995        Review of Systems   Constitutional: Negative for chills, fever and malaise/fatigue.   Cardiovascular: Negative for chest pain and palpitations.   Gastrointestinal: Negative for abdominal pain, nausea and vomiting.   Genitourinary: Positive for dysuria, frequency and urgency. Negative for flank pain and hematuria.   All other systems reviewed and are negative.     Objective:      Physical Exam   Constitutional: He is oriented to person, place, and time. Vital signs are normal. He appears well-developed and well-nourished. He is cooperative.   HENT:   Head: Normocephalic and atraumatic.   Right Ear: External ear normal.   Left Ear: External ear normal.   Mouth/Throat: Uvula is midline, oropharynx is clear and moist and mucous membranes are normal.   Eyes: Pupils are equal, round, and reactive to light. Conjunctivae, EOM and lids are normal.   Neck: Normal range of motion. Neck supple.   Cardiovascular: Normal rate, regular rhythm, normal heart sounds and intact distal pulses. "   Pulmonary/Chest: Effort normal and breath sounds normal.   Abdominal: Soft. Bowel sounds are normal. There is no hepatosplenomegaly. There is no tenderness.   Musculoskeletal: Normal range of motion.   Neurological: He is alert and oriented to person, place, and time. No cranial nerve deficit.   Skin: Skin is warm and dry. Capillary refill takes less than 2 seconds.   Psychiatric: He has a normal mood and affect.   Vitals reviewed.      Assessment:       1. Urinary tract infection with hematuria, site unspecified    2. Dysuria        Plan:     Urinary tract infection with hematuria, site unspecified  -     amoxicillin-clavulanate 875-125mg (AUGMENTIN) 875-125 mg per tablet; Take 1 tablet by mouth 2 (two) times daily. for 7 days  Dispense: 14 tablet; Refill: 0    Dysuria  -     POCT URINE DIPSTICK WITHOUT MICROSCOPE  -     Urine culture    · Increase fluids (avoid caffeine or sugary beverages as these will irritate the bladder).   · Take your antibiotics exactly as prescribed and make sure to complete entire course even if you begin to feel better. This will prevent recurrence of infection and antibiotic resistance.   · We have prescribed an antibiotic that covers most bacteria that cause urinary tract infections, however, if your urine culture shows that you have any bacterial resistance to the antibiotic you were prescribed or an unusual bacterial strain, we will notify you and make any necessary changes. Urine cultures usually result in about three days.   · Please follow up with your primary care provider if your symptoms do not improve within 2-3 days or sooner for any new or worsening symptoms.  · For any severe pain, bright red blood in urine, difficulty urinating, fever that does not improve with Tylenol or Ibuprofen, inability to urinate, incontinence of urine or bowels, or for any other urgent concerns, please go to the ER for immediate evaluation.     Follow up with PCP and Urology if symptoms persist or  worsen.

## 2019-10-02 ENCOUNTER — TELEPHONE (OUTPATIENT)
Dept: HOME HEALTH SERVICES | Facility: HOSPITAL | Age: 79
End: 2019-10-02

## 2019-10-03 ENCOUNTER — OFFICE VISIT (OUTPATIENT)
Dept: UROLOGY | Facility: CLINIC | Age: 79
End: 2019-10-03
Payer: MEDICARE

## 2019-10-03 VITALS
BODY MASS INDEX: 28.21 KG/M2 | HEIGHT: 75 IN | DIASTOLIC BLOOD PRESSURE: 82 MMHG | WEIGHT: 226.88 LBS | SYSTOLIC BLOOD PRESSURE: 136 MMHG

## 2019-10-03 DIAGNOSIS — R31.9 HEMATURIA, UNSPECIFIED TYPE: Primary | ICD-10-CM

## 2019-10-03 DIAGNOSIS — N40.0 BENIGN PROSTATIC HYPERPLASIA, UNSPECIFIED WHETHER LOWER URINARY TRACT SYMPTOMS PRESENT: ICD-10-CM

## 2019-10-03 PROCEDURE — 1101F PR PT FALLS ASSESS DOC 0-1 FALLS W/OUT INJ PAST YR: ICD-10-PCS | Mod: HCNC,CPTII,S$GLB, | Performed by: UROLOGY

## 2019-10-03 PROCEDURE — 1101F PT FALLS ASSESS-DOCD LE1/YR: CPT | Mod: HCNC,CPTII,S$GLB, | Performed by: UROLOGY

## 2019-10-03 PROCEDURE — 99999 PR PBB SHADOW E&M-EST. PATIENT-LVL III: CPT | Mod: PBBFAC,HCNC,, | Performed by: UROLOGY

## 2019-10-03 PROCEDURE — 3075F PR MOST RECENT SYSTOLIC BLOOD PRESS GE 130-139MM HG: ICD-10-PCS | Mod: HCNC,CPTII,S$GLB, | Performed by: UROLOGY

## 2019-10-03 PROCEDURE — 3079F DIAST BP 80-89 MM HG: CPT | Mod: HCNC,CPTII,S$GLB, | Performed by: UROLOGY

## 2019-10-03 PROCEDURE — 99999 PR PBB SHADOW E&M-EST. PATIENT-LVL III: ICD-10-PCS | Mod: PBBFAC,HCNC,, | Performed by: UROLOGY

## 2019-10-03 PROCEDURE — 99214 PR OFFICE/OUTPT VISIT, EST, LEVL IV, 30-39 MIN: ICD-10-PCS | Mod: 25,HCNC,S$GLB, | Performed by: UROLOGY

## 2019-10-03 PROCEDURE — 87086 URINE CULTURE/COLONY COUNT: CPT | Mod: HCNC

## 2019-10-03 PROCEDURE — 99214 OFFICE O/P EST MOD 30 MIN: CPT | Mod: 25,HCNC,S$GLB, | Performed by: UROLOGY

## 2019-10-03 PROCEDURE — 3079F PR MOST RECENT DIASTOLIC BLOOD PRESSURE 80-89 MM HG: ICD-10-PCS | Mod: HCNC,CPTII,S$GLB, | Performed by: UROLOGY

## 2019-10-03 PROCEDURE — 81002 POCT URINE DIPSTICK WITHOUT MICROSCOPE: ICD-10-PCS | Mod: HCNC,S$GLB,, | Performed by: UROLOGY

## 2019-10-03 PROCEDURE — 3075F SYST BP GE 130 - 139MM HG: CPT | Mod: HCNC,CPTII,S$GLB, | Performed by: UROLOGY

## 2019-10-03 PROCEDURE — 81002 URINALYSIS NONAUTO W/O SCOPE: CPT | Mod: HCNC,S$GLB,, | Performed by: UROLOGY

## 2019-10-03 NOTE — PROGRESS NOTES
Chief Complaint: Hematuria    HPI:   10/3/19: Had back surgery 8/20/19 and it is a lot better but did have a period of retention with a álvarez and then some OAB/nocturia from UTI; resolved on abx and then 3 wks symptoms recurred.  OAB/nocturia today, sudden urgency.  Foot powder made things getter.  Back on flomax since his troubles started.  PVR 70ml.  Constipation a problem BM q3d sometimes.  Miralax not improving.  No OTC benadryl.  12/10/18: US redemonstrates a 1.4 cm right renal cyst otherwise no adverse findings.  No more gross hematuria.  Says when he took flomax he voided too much and he stopped it.   for a few weeks and his wife complains of a brown discharge and dysuria.  Tinea cruris complaints.  12/4/17: CT Urogram reassuring.  Cysto today normal.  11/13/17: 75 yo man this summer saw gross hematuria; it stopped after a few days.  Has had sudden urgency just since that time.  No abd/pelvic pain and no exac/rel factors.  No other hematuria.  No urolithiasis.  No urinary bother.  Had urgency/incontinence and saw a urologist for that 2+ years ago and it got better.  US 8/17 showed no stones.    Allergies:  Sulfa (sulfonamide antibiotics)    Medications:  has a current medication list which includes the following prescription(s): accu-chek irving plus test strp, allopurinol, amoxicillin-clavulanate 875-125mg, aspirin, bisacodyl, blood-glucose meter, celecoxib, clonazepam, docusate sodium, ferrous sulfate, finasteride, gabapentin, garlic extract, glipizide, lancets, losartan, metformin, metoprolol succinate, multivitamin, pravastatin, tadalafil, and tamsulosin.    Review of Systems:  General: No fever, chills, fatigability, or weight loss.  Skin: No rashes, itching, or changes in color or texture of skin.  Chest: Denies DAMNO, cyanosis, wheezing, cough, and sputum production.  Abdomen: Appetite fine. No weight loss. Denies diarrhea, abdominal pain, hematemesis, or blood in stool.  Musculoskeletal: No joint  stiffness or swelling. Denies back pain.  : As above.  All other review of systems negative.    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    PSH:   has a past surgical history that includes Rotator cuff repair (Left, 2006); Shoulder arthroscopy w/ rotator cuff repair (Right, 2009); Laminectomy (07/22/2016); Hernia repair (Bilateral, 04/18/2017); Joint replacement (Left, 10/09/2017); Back surgery; lumbar foraminotomy (03/2018); left elbow (10/2017); and Revision total hip arthroplasty (Left, 9/11/2018).    FamHx: family history includes Cancer (age of onset: 50) in his brother; Diabetes in his father, mother, and sister; Drug abuse in his brother; Heart disease in his father and mother; Hypertension in his father and mother; Stroke in his father.    SocHx:  reports that he has never smoked. He has never used smokeless tobacco. He reports that he drinks about 1.0 standard drinks of alcohol per week. He reports that he does not use drugs.      Physical Exam:  Vitals:    10/03/19 1448   BP: 136/82     General: A&Ox3, no apparent distress, no deformities  Neck: No masses, normal thyroid  Lungs: normal inspiration, no use of accessory muscles  Heart: normal pulse, no arrhythmias  Abdomen: Soft, NT, ND  Skin: The skin is warm and dry. No jaundice.  Ext: No c/c/e.  :   12/17: Test desc digna, no abnormalities of epididymus. Penis normal, with normal penile and scrotal skin. Meatus normal. Normal rectal tone, no hemorrhoids. Prost  30 gm no nodules or masses appreciated. SV not palpable. Perineum and anus normal.    Labs/Studies:   Urinalysis performed in clinic, summary: UA normal  PSA    3/17: 0.53    11/18: 0.6    Impression/Plan:   1. OAB is his main complaint.  Constipation might be managed better and will likely help OAB.  RTC 1 mo to recheck.

## 2019-10-04 LAB
BACTERIA UR CULT: ABNORMAL
BACTERIA UR CULT: NO GROWTH

## 2019-10-10 ENCOUNTER — OFFICE VISIT (OUTPATIENT)
Dept: INTERNAL MEDICINE | Facility: CLINIC | Age: 79
End: 2019-10-10
Payer: MEDICARE

## 2019-10-10 VITALS
HEART RATE: 74 BPM | DIASTOLIC BLOOD PRESSURE: 58 MMHG | BODY MASS INDEX: 28.59 KG/M2 | HEIGHT: 75 IN | TEMPERATURE: 98 F | SYSTOLIC BLOOD PRESSURE: 120 MMHG | WEIGHT: 229.94 LBS

## 2019-10-10 DIAGNOSIS — M47.816 LUMBAR SPONDYLOSIS: ICD-10-CM

## 2019-10-10 DIAGNOSIS — E11.51 TYPE 2 DIABETES MELLITUS WITH DIABETIC PERIPHERAL ANGIOPATHY WITHOUT GANGRENE, WITHOUT LONG-TERM CURRENT USE OF INSULIN: Primary | ICD-10-CM

## 2019-10-10 DIAGNOSIS — K56.7 ILEUS, POSTOPERATIVE: ICD-10-CM

## 2019-10-10 DIAGNOSIS — K91.89 ILEUS, POSTOPERATIVE: ICD-10-CM

## 2019-10-10 DIAGNOSIS — R35.0 URINARY FREQUENCY: ICD-10-CM

## 2019-10-10 PROCEDURE — 1101F PT FALLS ASSESS-DOCD LE1/YR: CPT | Mod: HCNC,CPTII,S$GLB, | Performed by: PHYSICIAN ASSISTANT

## 2019-10-10 PROCEDURE — 99499 RISK ADDL DX/OHS AUDIT: ICD-10-PCS | Mod: HCNC,S$GLB,, | Performed by: PHYSICIAN ASSISTANT

## 2019-10-10 PROCEDURE — 99999 PR PBB SHADOW E&M-EST. PATIENT-LVL IV: CPT | Mod: PBBFAC,HCNC,, | Performed by: PHYSICIAN ASSISTANT

## 2019-10-10 PROCEDURE — 99214 PR OFFICE/OUTPT VISIT, EST, LEVL IV, 30-39 MIN: ICD-10-PCS | Mod: HCNC,S$GLB,, | Performed by: PHYSICIAN ASSISTANT

## 2019-10-10 PROCEDURE — 3074F SYST BP LT 130 MM HG: CPT | Mod: HCNC,CPTII,S$GLB, | Performed by: PHYSICIAN ASSISTANT

## 2019-10-10 PROCEDURE — 3074F PR MOST RECENT SYSTOLIC BLOOD PRESSURE < 130 MM HG: ICD-10-PCS | Mod: HCNC,CPTII,S$GLB, | Performed by: PHYSICIAN ASSISTANT

## 2019-10-10 PROCEDURE — 1101F PR PT FALLS ASSESS DOC 0-1 FALLS W/OUT INJ PAST YR: ICD-10-PCS | Mod: HCNC,CPTII,S$GLB, | Performed by: PHYSICIAN ASSISTANT

## 2019-10-10 PROCEDURE — 99999 PR PBB SHADOW E&M-EST. PATIENT-LVL IV: ICD-10-PCS | Mod: PBBFAC,HCNC,, | Performed by: PHYSICIAN ASSISTANT

## 2019-10-10 PROCEDURE — 99214 OFFICE O/P EST MOD 30 MIN: CPT | Mod: HCNC,S$GLB,, | Performed by: PHYSICIAN ASSISTANT

## 2019-10-10 PROCEDURE — 99499 UNLISTED E&M SERVICE: CPT | Mod: HCNC,S$GLB,, | Performed by: PHYSICIAN ASSISTANT

## 2019-10-10 PROCEDURE — 3078F PR MOST RECENT DIASTOLIC BLOOD PRESSURE < 80 MM HG: ICD-10-PCS | Mod: HCNC,CPTII,S$GLB, | Performed by: PHYSICIAN ASSISTANT

## 2019-10-10 PROCEDURE — 3078F DIAST BP <80 MM HG: CPT | Mod: HCNC,CPTII,S$GLB, | Performed by: PHYSICIAN ASSISTANT

## 2019-10-10 RX ORDER — LANCETS
EACH MISCELLANEOUS
Qty: 100 EACH | Refills: 2 | Status: SHIPPED | OUTPATIENT
Start: 2019-10-10 | End: 2019-11-21 | Stop reason: SDUPTHER

## 2019-10-10 RX ORDER — BLOOD SUGAR DIAGNOSTIC
STRIP MISCELLANEOUS
Qty: 100 STRIP | Refills: 1 | Status: SHIPPED | OUTPATIENT
Start: 2019-10-10 | End: 2019-11-21 | Stop reason: SDUPTHER

## 2019-10-10 NOTE — PROGRESS NOTES
Subjective:       Patient ID: Srinath Mcknight is a 78 y.o. male.    Chief Complaint: Follow-up (4w)    HPI     Patient comes in today for 4 week follow up   He was seen by his primary care for urinary issues as well as diabetes blood pressure.  He states overall he is healing well, the medication for his urine is working and he is not having as many problems other the issues not completely resolved.  He is having no hematuria and no abdominal pain.    He did have an ileus after surgery, he is currently on MiraLax daily and getting good results.  The last 2 days he has had normal bowel movements.  There are no preventive care reminders to display for this patient.    Past Medical History:   Diagnosis Date    Anemia due to unknown mechanism 11/10/2015    Angina pectoris 2014    not since    Arthritis     Back pain     Coronary artery disease     DM (diabetes mellitus) 25-30 years     am 05/15/2019    DM (diabetes mellitus)     BS 84 am 06/24/2019    Encounter for blood transfusion     Gout 12/05/2017    right foot     Hyperlipemia     Hypertension     CHARLES (obstructive sleep apnea)     Spinal cord disease     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    Trouble in sleeping     Type 2 diabetes mellitus with diabetic polyneuropathy, without long-term current use of insulin     Uses hearing aid     bilat       Current Outpatient Medications   Medication Sig Dispense Refill    ACCU-CHEK FRANKO PLUS TEST STRP Strp USE  1  STRIP ONE TIME DAILY 100 strip 1    allopurinol (ZYLOPRIM) 100 MG tablet TAKE 1 TABLET EVERY DAY 90 tablet 1    bisacodyl (DULCOLAX) 10 mg Supp   0    blood-glucose meter (ACCU-CHEK FRANKO PLUS METER) Misc 1 Device by Misc.(Non-Drug; Combo Route) route once daily. 1 each 0    celecoxib (CELEBREX) 200 MG capsule TAKE 1 CAPSULE(200 MG) BY MOUTH EVERY DAY 90 capsule 0    clonazePAM (KLONOPIN) 0.5 MG disintegrating tablet DISSOLVE ONE TABLET BY MOUTH EVERY NIGHT AT BEDTIME  90 tablet 0    docusate sodium (COLACE) 100 MG capsule Take 1 capsule (100 mg total) by mouth 2 (two) times daily as needed. (Patient taking differently: Take 100 mg by mouth 2 (two) times daily. Per [facility]) 60 capsule 0    ferrous sulfate 324 mg (65 mg iron) TbEC Take 1 tablet (324 mg total) by mouth once daily.      finasteride (PROSCAR) 5 mg tablet Take 1 tablet (5 mg total) by mouth once daily. 30 tablet 6    gabapentin (NEURONTIN) 300 MG capsule Take 300 mg by mouth 3 (three) times daily.      GARLIC EXTRACT ORAL Take 1 tablet by mouth once daily. Per [facility]      glipiZIDE (GLUCOTROL) 5 MG tablet Take 2 tablets (10 mg total) by mouth 2 (two) times daily with meals. 360 tablet 4    lancets (ACCU-CHEK SOFTCLIX LANCETS) Misc 1 lancet by Misc.(Non-Drug; Combo Route) route once daily. 100 each 2    losartan (COZAAR) 50 MG tablet Take 1 tablet (50 mg total) by mouth once daily. 90 tablet 4    metFORMIN (GLUCOPHAGE) 1000 MG tablet TAKE 1 TABLET TWICE DAILY WITH MEALS 180 tablet 4    metoprolol succinate (TOPROL-XL) 50 MG 24 hr tablet TAKE 1 TABLET EVERY DAY 90 tablet 3    multivitamin (ONE DAILY MULTIVITAMIN) per tablet Take 1 tablet by mouth once daily.      pravastatin (PRAVACHOL) 40 MG tablet TAKE 1 TABLET EVERY DAY 90 tablet 3    tadalafil (CIALIS) 5 MG tablet Take 1 tablet (5 mg total) by mouth daily as needed for Erectile Dysfunction. 30 tablet 0    tamsulosin (FLOMAX) 0.4 mg Cap Take 1 capsule (0.4 mg total) by mouth once daily. 90 capsule 3    aspirin (ECOTRIN) 81 MG EC tablet Take 1 tablet (81 mg total) by mouth once daily.  0     No current facility-administered medications for this visit.        Review of Systems   Constitutional: Negative for fatigue, fever and unexpected weight change.   HENT: Negative for sore throat and trouble swallowing.    Respiratory: Negative for cough and shortness of breath.    Cardiovascular: Negative for chest pain.   Gastrointestinal:  "Negative for abdominal pain.   Genitourinary: Positive for frequency.   Musculoskeletal: Positive for arthralgias, back pain and gait problem.   Hematological: Negative for adenopathy. Does not bruise/bleed easily.   All other systems reviewed and are negative.      Objective:   BP (!) 120/58   Pulse 74   Temp 97.6 °F (36.4 °C)   Ht 6' 3" (1.905 m)   Wt 104.3 kg (229 lb 15 oz)   BMI 28.74 kg/m²      Physical Exam   Constitutional: He is oriented to person, place, and time. He appears well-developed and well-nourished. No distress.   HENT:   Head: Normocephalic and atraumatic.   Eyes: Pupils are equal, round, and reactive to light. EOM are normal.   Neck: Normal range of motion. Neck supple.   Cardiovascular: Normal rate and regular rhythm.   Pulmonary/Chest: Effort normal.   Abdominal: Soft.   Musculoskeletal: He exhibits no edema.   Neurological: He is alert and oriented to person, place, and time.   Skin: Capillary refill takes less than 2 seconds.   Psychiatric: He has a normal mood and affect. His behavior is normal.         Lab Results   Component Value Date    WBC 6.00 09/12/2019    HGB 9.7 (L) 09/12/2019    HCT 30.0 (L) 09/12/2019     (H) 09/12/2019    CHOL 153 06/12/2019    TRIG 263 (H) 06/12/2019    HDL 27 (L) 06/12/2019    ALT 21 09/12/2019    AST 19 09/12/2019     09/12/2019    K 4.7 09/12/2019     09/12/2019    CREATININE 1.3 09/12/2019    BUN 12 09/12/2019    CO2 27 09/12/2019    TSH 1.362 12/23/2011    PSA 0.53 06/12/2019    INR 1.0 07/31/2019    GLUF 106 08/22/2011    HGBA1C 5.9 (H) 06/12/2019       Assessment:       1. Type 2 diabetes mellitus with diabetic peripheral angiopathy without gangrene, without long-term current use of insulin    2. Lumbar spondylosis    3. Urinary frequency    4. Ileus, postoperative        Plan:   Type 2 diabetes mellitus with diabetic peripheral angiopathy without gangrene, without long-term current use of insulin    Lumbar spondylosis    Urinary " frequency    Ileus, postoperative      Above issues seem to be well controlled  DM numbers range  per patient  Bowels moving well     Urine issue improved    Follow up in about 4 months (around 2/10/2020).

## 2019-11-01 ENCOUNTER — OFFICE VISIT (OUTPATIENT)
Dept: INTERNAL MEDICINE | Facility: CLINIC | Age: 79
End: 2019-11-01
Payer: MEDICARE

## 2019-11-01 VITALS
DIASTOLIC BLOOD PRESSURE: 60 MMHG | TEMPERATURE: 98 F | WEIGHT: 227.06 LBS | BODY MASS INDEX: 28.23 KG/M2 | HEIGHT: 75 IN | SYSTOLIC BLOOD PRESSURE: 110 MMHG

## 2019-11-01 DIAGNOSIS — E11.51 TYPE 2 DIABETES MELLITUS WITH DIABETIC PERIPHERAL ANGIOPATHY WITHOUT GANGRENE, WITHOUT LONG-TERM CURRENT USE OF INSULIN: ICD-10-CM

## 2019-11-01 DIAGNOSIS — M53.3 SACROILIAC JOINT PAIN: ICD-10-CM

## 2019-11-01 DIAGNOSIS — M54.42 CHRONIC BILATERAL LOW BACK PAIN WITH BILATERAL SCIATICA: Chronic | ICD-10-CM

## 2019-11-01 DIAGNOSIS — E11.69 COMBINED HYPERLIPIDEMIA ASSOCIATED WITH TYPE 2 DIABETES MELLITUS: Chronic | ICD-10-CM

## 2019-11-01 DIAGNOSIS — D64.9 CHRONIC ANEMIA: ICD-10-CM

## 2019-11-01 DIAGNOSIS — M47.816 LUMBAR SPONDYLOSIS: ICD-10-CM

## 2019-11-01 DIAGNOSIS — I10 ESSENTIAL HYPERTENSION: Chronic | ICD-10-CM

## 2019-11-01 DIAGNOSIS — N40.0 BENIGN PROSTATIC HYPERPLASIA, UNSPECIFIED WHETHER LOWER URINARY TRACT SYMPTOMS PRESENT: ICD-10-CM

## 2019-11-01 DIAGNOSIS — E11.42 TYPE 2 DIABETES MELLITUS WITH DIABETIC POLYNEUROPATHY, WITHOUT LONG-TERM CURRENT USE OF INSULIN: Chronic | ICD-10-CM

## 2019-11-01 DIAGNOSIS — G47.33 OSA (OBSTRUCTIVE SLEEP APNEA): Chronic | ICD-10-CM

## 2019-11-01 DIAGNOSIS — D64.9 ANEMIA DUE TO UNKNOWN MECHANISM: ICD-10-CM

## 2019-11-01 DIAGNOSIS — Z00.00 ENCOUNTER FOR PREVENTIVE HEALTH EXAMINATION: Primary | ICD-10-CM

## 2019-11-01 DIAGNOSIS — M54.41 CHRONIC BILATERAL LOW BACK PAIN WITH BILATERAL SCIATICA: Chronic | ICD-10-CM

## 2019-11-01 DIAGNOSIS — M47.26 OSTEOARTHRITIS OF SPINE WITH RADICULOPATHY, LUMBAR REGION: Chronic | ICD-10-CM

## 2019-11-01 DIAGNOSIS — M25.552 CHRONIC HIP PAIN AFTER TOTAL REPLACEMENT OF LEFT HIP JOINT: ICD-10-CM

## 2019-11-01 DIAGNOSIS — G47.61 PLMD (PERIODIC LIMB MOVEMENT DISORDER): Chronic | ICD-10-CM

## 2019-11-01 DIAGNOSIS — I25.10 CORONARY ARTERY DISEASE, OCCLUSIVE: ICD-10-CM

## 2019-11-01 DIAGNOSIS — Z96.642 CHRONIC HIP PAIN AFTER TOTAL REPLACEMENT OF LEFT HIP JOINT: ICD-10-CM

## 2019-11-01 DIAGNOSIS — G89.29 CHRONIC HIP PAIN AFTER TOTAL REPLACEMENT OF LEFT HIP JOINT: ICD-10-CM

## 2019-11-01 DIAGNOSIS — I70.0 AORTIC CALCIFICATION: Chronic | ICD-10-CM

## 2019-11-01 DIAGNOSIS — G47.52 REM BEHAVIORAL DISORDER: ICD-10-CM

## 2019-11-01 DIAGNOSIS — M1A.0710 IDIOPATHIC CHRONIC GOUT OF RIGHT FOOT WITHOUT TOPHUS: Chronic | ICD-10-CM

## 2019-11-01 DIAGNOSIS — G89.29 CHRONIC BILATERAL LOW BACK PAIN WITH BILATERAL SCIATICA: Chronic | ICD-10-CM

## 2019-11-01 DIAGNOSIS — E78.2 COMBINED HYPERLIPIDEMIA ASSOCIATED WITH TYPE 2 DIABETES MELLITUS: Chronic | ICD-10-CM

## 2019-11-01 PROCEDURE — 3078F DIAST BP <80 MM HG: CPT | Mod: HCNC,CPTII,S$GLB, | Performed by: PHYSICIAN ASSISTANT

## 2019-11-01 PROCEDURE — 3074F SYST BP LT 130 MM HG: CPT | Mod: HCNC,CPTII,S$GLB, | Performed by: PHYSICIAN ASSISTANT

## 2019-11-01 PROCEDURE — 3074F PR MOST RECENT SYSTOLIC BLOOD PRESSURE < 130 MM HG: ICD-10-PCS | Mod: HCNC,CPTII,S$GLB, | Performed by: PHYSICIAN ASSISTANT

## 2019-11-01 PROCEDURE — 99999 PR PBB SHADOW E&M-EST. PATIENT-LVL III: ICD-10-PCS | Mod: PBBFAC,HCNC,, | Performed by: PHYSICIAN ASSISTANT

## 2019-11-01 PROCEDURE — G0439 PR MEDICARE ANNUAL WELLNESS SUBSEQUENT VISIT: ICD-10-PCS | Mod: HCNC,S$GLB,, | Performed by: PHYSICIAN ASSISTANT

## 2019-11-01 PROCEDURE — G0439 PPPS, SUBSEQ VISIT: HCPCS | Mod: HCNC,S$GLB,, | Performed by: PHYSICIAN ASSISTANT

## 2019-11-01 PROCEDURE — 3078F PR MOST RECENT DIASTOLIC BLOOD PRESSURE < 80 MM HG: ICD-10-PCS | Mod: HCNC,CPTII,S$GLB, | Performed by: PHYSICIAN ASSISTANT

## 2019-11-01 PROCEDURE — 99999 PR PBB SHADOW E&M-EST. PATIENT-LVL III: CPT | Mod: PBBFAC,HCNC,, | Performed by: PHYSICIAN ASSISTANT

## 2019-11-01 NOTE — PROGRESS NOTES
"I offered to discuss end of life issues, including information on how to make advance directives that the patient could use to name someone who would make medical decisions on their behalf if they became too ill to make themselves.  _X_Patient declined - pt reports already has advance directives in place.  ___Patient is interested, I provided paper work and offered to discuss.    Srinath Mcknight presented for a  Medicare AWV and comprehensive Health Risk Assessment today. The following components were reviewed and updated:    · Medical history  · Family History  · Social history  · Allergies and Current Medications  · Health Risk Assessment  · Health Maintenance  · Care Team     ** See Completed Assessments for Annual Wellness Visit within the encounter summary.**       The following assessments were completed:  · Living Situation  · CAGE  · Depression Screening  · Timed Get Up and Go  · Whisper Test  · Cognitive Function Screening  · Nutrition Screening  · ADL Screening  · PAQ Screening    Patient Care Team:  Yeison Wilder MD as PCP - General (Internal Medicine)  Amol New OD as Consulting Physician (Optometry)  Sin Bacon MD as Consulting Physician (Orthopedic Surgery)  Martin Machuca MD (Pulmonary Disease)  Jeffy Iqbal MD as Consulting Physician (Neurosurgery)  Erik Roldan MD as Consulting Physician (Pain Medicine)  Chauncey Amador IV, MD as Consulting Physician (Urology)  Elver Cobos MD as Consulting Physician (Cardiology)  Nubia Cao LPN as Care Coordinator (Internal Medicine)  Megha George NP as Nurse Practitioner (Hematology and Oncology)  Alfred Gomez MD as Consulting Physician (Orthopedic Surgery)  Tyshawn Lester DPM as Consulting Physician (Podiatry)    Vitals:    11/01/19 0926   BP: 110/60   BP Location: Right arm   Temp: 97.7 °F (36.5 °C)   TempSrc: Oral   Weight: 103 kg (227 lb 1.2 oz)   Height: 6' 3" (1.905 m)     Body mass index is 28.38 " kg/m².     Physical Exam   Constitutional: He is oriented to person, place, and time. He appears well-developed and well-nourished. No distress.   Pt ambulates with cane   HENT:   Head: Normocephalic and atraumatic.   Eyes: EOM are normal. No scleral icterus.   Neck: Neck supple.   Cardiovascular: Normal rate and regular rhythm.   Pulmonary/Chest: Effort normal and breath sounds normal. No respiratory distress. He has no decreased breath sounds. He has no wheezes. He has no rhonchi. He has no rales.   Musculoskeletal: Normal range of motion.   Neurological: He is alert and oriented to person, place, and time.   Skin: Skin is dry. He is not diaphoretic.   Psychiatric: He has a normal mood and affect. His speech is normal and behavior is normal. Thought content normal.         Diagnoses and health risks identified today and associated recommendations/orders:    1. Encounter for preventive health examination  Pt to consider Shingrix at pharmacy as discussed.    2. Aortic calcification  Stable. CT urogram 11/20/17. Pt taking aspirin and pravastatin. Continue current treatment plan as prescribed by your PCP and cardiologist and f/u with them for further management.    3. Combined hyperlipidemia associated with type 2 diabetes mellitus  Stable. Pt taking aspirin and pravastatin. Continue current treatment plan as prescribed by your PCP and cardiologist and f/u with them for further management.  Component      Latest Ref Rng & Units 6/12/2019 10/30/2018   Cholesterol      120 - 199 mg/dL 153 142   Triglycerides      30 - 150 mg/dL 263 (H) 103   HDL      40 - 75 mg/dL 27 (L) 30 (L)   LDL Cholesterol External      63.0 - 159.0 mg/dL 73.4 91.4   Hdl/Cholesterol Ratio      20.0 - 50.0 % 17.6 (L) 21.1   Total Cholesterol/HDL Ratio      2.0 - 5.0 5.7 (H) 4.7   Non-HDL Cholesterol      mg/dL 126 112     4. Type 2 diabetes mellitus with diabetic polyneuropathy, without long-term current use of insulin; 5. Type 2 diabetes mellitus  "with diabetic peripheral angiopathy without gangrene, without long-term current use of insulin  Controlled. Pt taking metformin and glipizide. Check feet daily, monitor glucose, and stay UTD with eye doctor. Continue current treatment plan as prescribed by your PCP and f/u with your PCP for further management.  Component      Latest Ref Rng & Units 6/12/2019 10/30/2018   Hemoglobin A1C External      4.0 - 5.6 % 5.9 (H) 5.6   Estimated Avg Glucose      68 - 131 mg/dL 123 114     6. Osteoarthritis of spine with radiculopathy, lumbar region; 7. Sacroiliac joint pain; 8. Lumbar spondylosis; 9. Chronic bilateral low back pain with bilateral sciatica  Stable. L-spine MRI 12/20/17. As per Dr. Roldan's 3/20/18 office note: "Patient is s/p L4-5 Microdiscectomy and cyst removal with Dr Iqbal on March 6th with significant improvement in pain." Pt taking gabapentin and Celebrex. Continue current treatment plan as prescribed by your PCP and specialists and f/u with them for further management.    10. 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 Pt currently in PT. Pt taking gabapentin and Celebrex. Continue current treatment plan as prescribed by your PCP and specialists and f/u with them for further management.    11. Benign prostatic hyperplasia, unspecified whether lower urinary tract symptoms present  Stable. Pt taking Proscar and Flomax. Continue current treatment plan as prescribed by your PCP and urologist and f/u with them for further management.    12. REM behavioral disorder, 13. PLMD (periodic limb movement disorder)  Stable. PSG 3/1/17. Pt taking melatonin, rarely clonazepam. Continue current treatment plan as prescribed by your PCP and specialists and f/u with them for further management.    14. CHARLES (obstructive sleep apnea)  Stable. PSG 3/1/17. Pt does not use CPAP. Continue current treatment plan as prescribed by your PCP and f/u with your PCP for further management.    15. Essential " hypertension  Stable. Pt taking losartan and metoprolol. Continue current treatment plan as prescribed by your PCP and cardiologist and f/u with them for further management.    16. Coronary artery disease, occlusive  Stable. Pt taking aspirin, metoprolol, and pravastatin. Continue current treatment plan as prescribed by your PCP and cardiologist and f/u with them for further management.    17. Anemia due to unknown mechanism, 18. Chronic anemia  Stable. Pt taking iron suppl. Continue current treatment plan as prescribed by your PCP and hem/onc and f/u with them for further management.  Component      Latest Ref Rng & Units 9/12/2019 7/31/2019 9/14/2018   Hemoglobin      14.0 - 18.0 g/dL 9.7 (L) 11.7 (L) 9.1 (L)   Hematocrit      40.0 - 54.0 % 30.0 (L) 36.8 (L) 26.4 (L)     19. Idiopathic chronic gout of right foot without tophus  Stable. Pt taking allopurinol. Continue current treatment plan as prescribed by your PCP and f/u with your PCP for further management.    Please obtain any medical records from past / present outside medical providers and give to PCP for review and further medical recommendations.    Provided [place] with a 5-10 year written screening schedule and personal prevention plan. Recommendations were developed using the USPSTF age appropriate recommendations. Education, counseling, and referrals were provided as needed. After Visit Summary printed and given to patient which includes a list of additional screenings\tests needed.    Follow up in about 1 year (around 11/1/2020) for HRA. F/u with PCP Dr. Wilder and specialists as recommended for health management.    BIJAN Ibarra

## 2019-11-01 NOTE — PATIENT INSTRUCTIONS
Counseling and Referral of Other Preventative  (Italic type indicates deductible and co-insurance are waived)    Patient Name: Srinath Mcknight  Today's Date: 11/1/2019    Health Maintenance       Date Due Completion Date    Aspirin/Antiplatelet Therapy 11/21/1960 ---    Shingles Vaccine (1 of 2) 11/21/1992 ---    Hemoglobin A1c 12/12/2019 6/12/2019    Foot Exam 04/17/2020 4/17/2019    Override on 12/6/2017: Done    Override on 12/6/2016: Done    Override on 9/13/2016: Done    Override on 11/10/2015: Done    Override on 9/1/2015: Done    Override on 10/8/2013: Done    Override on 6/14/2012: Done    PROSTATE-SPECIFIC ANTIGEN 06/12/2020 6/12/2019    Lipid Panel 06/12/2020 6/12/2019    Eye Exam 06/24/2020 6/24/2019    Override on 5/15/2019: Done    Override on 1/23/2018: Done    Override on 1/10/2017: Done    Override on 12/22/2015: Done    Override on 9/29/2014: Done    Override on 12/12/2012: Done    Colonoscopy 10/16/2020 10/16/2017    Override on 12/30/2004: Done (REPEAT 5-10 YRS)    TETANUS VACCINE 05/15/2028 5/15/2018        No orders of the defined types were placed in this encounter.    The following information is provided to all patients.  This information is to help you find resources for any of the problems found today that may be affecting your health:                Living healthy guide: www.[facility].[place].gov      Understanding Diabetes: www.diabetes.org      Eating healthy: www.cdc.gov/healthyweight      CDC home safety checklist: www.cdc.gov/steadi/patient.html      Agency on Aging: www.goea.[place].gov      Alcoholics anonymous (AA): www.aa.org      Physical Activity: www.michael.nih.gov/up0csfd      Tobacco use: www.quitwithusla.org

## 2019-11-06 ENCOUNTER — OFFICE VISIT (OUTPATIENT)
Dept: UROLOGY | Facility: CLINIC | Age: 79
End: 2019-11-06
Payer: MEDICARE

## 2019-11-06 VITALS
DIASTOLIC BLOOD PRESSURE: 82 MMHG | BODY MASS INDEX: 28.23 KG/M2 | HEIGHT: 75 IN | WEIGHT: 227.06 LBS | SYSTOLIC BLOOD PRESSURE: 134 MMHG

## 2019-11-06 DIAGNOSIS — N28.1 RENAL CYST: ICD-10-CM

## 2019-11-06 DIAGNOSIS — N32.81 OAB (OVERACTIVE BLADDER): Primary | ICD-10-CM

## 2019-11-06 DIAGNOSIS — Z12.5 ENCOUNTER FOR SCREENING FOR MALIGNANT NEOPLASM OF PROSTATE: ICD-10-CM

## 2019-11-06 LAB
BILIRUB SERPL-MCNC: NORMAL MG/DL
BLOOD URINE, POC: NORMAL
COLOR, POC UA: YELLOW
GLUCOSE UR QL STRIP: NORMAL
KETONES UR QL STRIP: NORMAL
LEUKOCYTE ESTERASE URINE, POC: NORMAL
NITRITE, POC UA: NORMAL
PH, POC UA: 5
PROTEIN, POC: NORMAL
SPECIFIC GRAVITY, POC UA: 1.02
UROBILINOGEN, POC UA: NORMAL

## 2019-11-06 PROCEDURE — 3079F DIAST BP 80-89 MM HG: CPT | Mod: HCNC,CPTII,S$GLB, | Performed by: UROLOGY

## 2019-11-06 PROCEDURE — 3079F PR MOST RECENT DIASTOLIC BLOOD PRESSURE 80-89 MM HG: ICD-10-PCS | Mod: HCNC,CPTII,S$GLB, | Performed by: UROLOGY

## 2019-11-06 PROCEDURE — 99214 PR OFFICE/OUTPT VISIT, EST, LEVL IV, 30-39 MIN: ICD-10-PCS | Mod: HCNC,25,S$GLB, | Performed by: UROLOGY

## 2019-11-06 PROCEDURE — 99214 OFFICE O/P EST MOD 30 MIN: CPT | Mod: HCNC,25,S$GLB, | Performed by: UROLOGY

## 2019-11-06 PROCEDURE — 99999 PR PBB SHADOW E&M-EST. PATIENT-LVL III: CPT | Mod: PBBFAC,HCNC,, | Performed by: UROLOGY

## 2019-11-06 PROCEDURE — 81002 POCT URINE DIPSTICK WITHOUT MICROSCOPE: ICD-10-PCS | Mod: HCNC,S$GLB,, | Performed by: UROLOGY

## 2019-11-06 PROCEDURE — 81002 URINALYSIS NONAUTO W/O SCOPE: CPT | Mod: HCNC,S$GLB,, | Performed by: UROLOGY

## 2019-11-06 PROCEDURE — 1101F PR PT FALLS ASSESS DOC 0-1 FALLS W/OUT INJ PAST YR: ICD-10-PCS | Mod: HCNC,CPTII,S$GLB, | Performed by: UROLOGY

## 2019-11-06 PROCEDURE — 1101F PT FALLS ASSESS-DOCD LE1/YR: CPT | Mod: HCNC,CPTII,S$GLB, | Performed by: UROLOGY

## 2019-11-06 PROCEDURE — 99999 PR PBB SHADOW E&M-EST. PATIENT-LVL III: ICD-10-PCS | Mod: PBBFAC,HCNC,, | Performed by: UROLOGY

## 2019-11-06 PROCEDURE — 3075F PR MOST RECENT SYSTOLIC BLOOD PRESS GE 130-139MM HG: ICD-10-PCS | Mod: HCNC,CPTII,S$GLB, | Performed by: UROLOGY

## 2019-11-06 PROCEDURE — 3075F SYST BP GE 130 - 139MM HG: CPT | Mod: HCNC,CPTII,S$GLB, | Performed by: UROLOGY

## 2019-11-07 ENCOUNTER — OFFICE VISIT (OUTPATIENT)
Dept: URGENT CARE | Facility: CLINIC | Age: 79
End: 2019-11-07
Payer: MEDICARE

## 2019-11-07 VITALS
HEIGHT: 75 IN | HEART RATE: 85 BPM | DIASTOLIC BLOOD PRESSURE: 66 MMHG | BODY MASS INDEX: 28.81 KG/M2 | TEMPERATURE: 98 F | WEIGHT: 231.69 LBS | SYSTOLIC BLOOD PRESSURE: 124 MMHG | OXYGEN SATURATION: 98 %

## 2019-11-07 DIAGNOSIS — H00.011 HORDEOLUM OF RIGHT UPPER EYELID, UNSPECIFIED HORDEOLUM TYPE: Primary | ICD-10-CM

## 2019-11-07 PROCEDURE — 3074F PR MOST RECENT SYSTOLIC BLOOD PRESSURE < 130 MM HG: ICD-10-PCS | Mod: HCNC,CPTII,S$GLB, | Performed by: PHYSICIAN ASSISTANT

## 2019-11-07 PROCEDURE — 3074F SYST BP LT 130 MM HG: CPT | Mod: HCNC,CPTII,S$GLB, | Performed by: PHYSICIAN ASSISTANT

## 2019-11-07 PROCEDURE — 99999 PR PBB SHADOW E&M-EST. PATIENT-LVL III: ICD-10-PCS | Mod: PBBFAC,HCNC,, | Performed by: PHYSICIAN ASSISTANT

## 2019-11-07 PROCEDURE — 3078F PR MOST RECENT DIASTOLIC BLOOD PRESSURE < 80 MM HG: ICD-10-PCS | Mod: HCNC,CPTII,S$GLB, | Performed by: PHYSICIAN ASSISTANT

## 2019-11-07 PROCEDURE — 3078F DIAST BP <80 MM HG: CPT | Mod: HCNC,CPTII,S$GLB, | Performed by: PHYSICIAN ASSISTANT

## 2019-11-07 PROCEDURE — 1101F PT FALLS ASSESS-DOCD LE1/YR: CPT | Mod: HCNC,CPTII,S$GLB, | Performed by: PHYSICIAN ASSISTANT

## 2019-11-07 PROCEDURE — 99213 OFFICE O/P EST LOW 20 MIN: CPT | Mod: HCNC,S$GLB,, | Performed by: PHYSICIAN ASSISTANT

## 2019-11-07 PROCEDURE — 99213 PR OFFICE/OUTPT VISIT, EST, LEVL III, 20-29 MIN: ICD-10-PCS | Mod: HCNC,S$GLB,, | Performed by: PHYSICIAN ASSISTANT

## 2019-11-07 PROCEDURE — 99999 PR PBB SHADOW E&M-EST. PATIENT-LVL III: CPT | Mod: PBBFAC,HCNC,, | Performed by: PHYSICIAN ASSISTANT

## 2019-11-07 PROCEDURE — 1101F PR PT FALLS ASSESS DOC 0-1 FALLS W/OUT INJ PAST YR: ICD-10-PCS | Mod: HCNC,CPTII,S$GLB, | Performed by: PHYSICIAN ASSISTANT

## 2019-11-07 NOTE — PROGRESS NOTES
"Srinath Mcknight is a 78 year old male who presents today with complaints of a painful lesion to his right upper eyelid that started this morning when he woke up.  He states he has also had a non-painful lesion to his left upper eyelid that has been present for 3-4 months.  No vision changes.  No sensation of foreign body in eye.      All areas of patients chart reviewed including past medical history, past surgical history, medications, allergies, family history, and social history.    Review of Systems   Constitutional: Negative for fever.   HENT: Negative for sore throat.    Eyes: Positive for pain. Negative for blurred vision, double vision, photophobia and discharge.   Respiratory: Negative for shortness of breath.    Cardiovascular: Negative for chest pain.   Gastrointestinal: Negative for abdominal pain and nausea.   Genitourinary: Negative for dysuria and frequency.   Musculoskeletal: Negative for back pain.   Neurological: Negative for headaches.   All other systems reviewed and are negative.    Objective:  /66   Pulse 85   Temp 97.8 °F (36.6 °C) (Tympanic)   Ht 6' 3" (1.905 m)   Wt 105.1 kg (231 lb 11.3 oz)   SpO2 98%   BMI 28.96 kg/m²   Physical Exam   Constitutional: He is oriented to person, place, and time and well-developed, well-nourished, and in no distress.   HENT:   Head: Normocephalic.   Right Ear: External ear normal.   Left Ear: External ear normal.   Mouth/Throat: No oropharyngeal exudate.   Eyes: Pupils are equal, round, and reactive to light. Conjunctivae, EOM and lids are normal. Right eye exhibits hordeolum (upper lid). Right eye exhibits no discharge and no exudate. Left eye exhibits no discharge, no exudate and no hordeolum.       Neck: Normal range of motion.   Cardiovascular: Normal rate and normal heart sounds.   Pulmonary/Chest: Effort normal and breath sounds normal. No respiratory distress.   Neurological: He is alert and oriented to person, place, and time.   Skin: " Skin is warm.   Psychiatric: Affect normal.     Assessment:  Encounter Diagnosis   Name Primary?    Hordeolum of right upper eyelid, unspecified hordeolum type Yes     Plan:    Warm compresses to eyelid 2-3 times per day.  Follow up with eye doctor if no resolution in 3 days.  No antibiotics necessary.  Report to ER with new or worsening symptoms.

## 2019-11-07 NOTE — PATIENT INSTRUCTIONS
Warm compresses to eyelid 2-3 times per day.  Follow up with eye doctor if no resolution in 3 days.  No antibiotics necessary.  Report to ER with new or worsening symptoms.

## 2019-11-12 ENCOUNTER — OFFICE VISIT (OUTPATIENT)
Dept: PODIATRY | Facility: CLINIC | Age: 79
End: 2019-11-12
Payer: MEDICARE

## 2019-11-12 VITALS
HEART RATE: 75 BPM | DIASTOLIC BLOOD PRESSURE: 66 MMHG | BODY MASS INDEX: 28.59 KG/M2 | HEIGHT: 75 IN | WEIGHT: 229.94 LBS | SYSTOLIC BLOOD PRESSURE: 113 MMHG

## 2019-11-12 DIAGNOSIS — E11.9 ENCOUNTER FOR COMPREHENSIVE DIABETIC FOOT EXAMINATION, TYPE 2 DIABETES MELLITUS: Primary | ICD-10-CM

## 2019-11-12 DIAGNOSIS — B35.1 ONYCHOMYCOSIS: ICD-10-CM

## 2019-11-12 DIAGNOSIS — E11.51 TYPE 2 DIABETES MELLITUS WITH DIABETIC PERIPHERAL ANGIOPATHY WITHOUT GANGRENE, WITHOUT LONG-TERM CURRENT USE OF INSULIN: ICD-10-CM

## 2019-11-12 DIAGNOSIS — E11.42 TYPE 2 DIABETES MELLITUS WITH DIABETIC POLYNEUROPATHY, WITHOUT LONG-TERM CURRENT USE OF INSULIN: ICD-10-CM

## 2019-11-12 DIAGNOSIS — I87.2 VENOUS INSUFFICIENCY (CHRONIC) (PERIPHERAL): ICD-10-CM

## 2019-11-12 PROCEDURE — 11719 PR TRIM NAIL(S): ICD-10-PCS | Mod: Q9,HCNC,S$GLB, | Performed by: PODIATRIST

## 2019-11-12 PROCEDURE — 1101F PR PT FALLS ASSESS DOC 0-1 FALLS W/OUT INJ PAST YR: ICD-10-PCS | Mod: HCNC,CPTII,S$GLB, | Performed by: PODIATRIST

## 2019-11-12 PROCEDURE — 99213 OFFICE O/P EST LOW 20 MIN: CPT | Mod: 25,HCNC,S$GLB, | Performed by: PODIATRIST

## 2019-11-12 PROCEDURE — 99999 PR PBB SHADOW E&M-EST. PATIENT-LVL III: ICD-10-PCS | Mod: PBBFAC,HCNC,, | Performed by: PODIATRIST

## 2019-11-12 PROCEDURE — 1101F PT FALLS ASSESS-DOCD LE1/YR: CPT | Mod: HCNC,CPTII,S$GLB, | Performed by: PODIATRIST

## 2019-11-12 PROCEDURE — 3074F PR MOST RECENT SYSTOLIC BLOOD PRESSURE < 130 MM HG: ICD-10-PCS | Mod: HCNC,CPTII,S$GLB, | Performed by: PODIATRIST

## 2019-11-12 PROCEDURE — 11720 PR DEBRIDEMENT OF NAIL(S), 1-5: ICD-10-PCS | Mod: Q9,59,HCNC,S$GLB | Performed by: PODIATRIST

## 2019-11-12 PROCEDURE — 99999 PR PBB SHADOW E&M-EST. PATIENT-LVL III: CPT | Mod: PBBFAC,HCNC,, | Performed by: PODIATRIST

## 2019-11-12 PROCEDURE — 11720 DEBRIDE NAIL 1-5: CPT | Mod: Q9,59,HCNC,S$GLB | Performed by: PODIATRIST

## 2019-11-12 PROCEDURE — 3078F DIAST BP <80 MM HG: CPT | Mod: HCNC,CPTII,S$GLB, | Performed by: PODIATRIST

## 2019-11-12 PROCEDURE — 99213 PR OFFICE/OUTPT VISIT, EST, LEVL III, 20-29 MIN: ICD-10-PCS | Mod: 25,HCNC,S$GLB, | Performed by: PODIATRIST

## 2019-11-12 PROCEDURE — 3074F SYST BP LT 130 MM HG: CPT | Mod: HCNC,CPTII,S$GLB, | Performed by: PODIATRIST

## 2019-11-12 PROCEDURE — 11719 TRIM NAIL(S) ANY NUMBER: CPT | Mod: Q9,HCNC,S$GLB, | Performed by: PODIATRIST

## 2019-11-12 PROCEDURE — 3078F PR MOST RECENT DIASTOLIC BLOOD PRESSURE < 80 MM HG: ICD-10-PCS | Mod: HCNC,CPTII,S$GLB, | Performed by: PODIATRIST

## 2019-11-12 NOTE — PROGRESS NOTES
Subjective:       Patient ID: Srinath Mcknight is a 78 y.o. male.    Chief Complaint: Foot Problem (pt c/o swelling and heavy feel on both feet,rates pain 3/10, wears tennis w/socks,diabetic pt, PCP Dr. Wilder.)      HPI: Patient presents to the office today with the chief complaint of elongated, thickened and dystrophic nail plates to the B/L foot. This patient is a Diabetic Type II, complicated with Peripheral Angiopathy and Peripheral Neuropathy. Patient does follow with Primary Care and/or Endocrinology for management of Diabetes Mellitus. This patient's PMD is Yeison Wilder MD. This patient last saw his/her primary care provider on 11/1.    Hemoglobin A1C   Date Value Ref Range Status   06/12/2019 5.9 (H) 4.0 - 5.6 % Final     Comment:     ADA Screening Guidelines:  5.7-6.4%  Consistent with prediabetes  >or=6.5%  Consistent with diabetes  High levels of fetal hemoglobin interfere with the HbA1C  assay. Heterozygous hemoglobin variants (HbS, HgC, etc)do  not significantly interfere with this assay.   However, presence of multiple variants may affect accuracy.     10/30/2018 5.6 4.0 - 5.6 % Final     Comment:     ADA Screening Guidelines:  5.7-6.4%  Consistent with prediabetes  >or=6.5%  Consistent with diabetes  High levels of fetal hemoglobin interfere with the HbA1C  assay. Heterozygous hemoglobin variants (HbS, HgC, etc)do  not significantly interfere with this assay.   However, presence of multiple variants may affect accuracy.     09/11/2018 5.5 4.0 - 5.6 % Final     Comment:     ADA Screening Guidelines:  5.7-6.4%  Consistent with prediabetes  >or=6.5%  Consistent with diabetes  High levels of fetal hemoglobin interfere with the HbA1C  assay. Heterozygous hemoglobin variants (HbS, HgC, etc)do  not significantly interfere with this assay.   However, presence of multiple variants may affect accuracy.     .    Review of patient's allergies indicates:   Allergen Reactions    Sulfa (sulfonamide antibiotics)       Can't recall from 1995       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    Trouble in sleeping     Type 2 diabetes mellitus with diabetic polyneuropathy, without long-term current use of insulin     Urinary incontinence     Uses hearing aid     bilat       Family History   Problem Relation Age of Onset    Hypertension Mother     Heart disease Mother     Diabetes Mother     Hypertension Father     Heart disease Father     Diabetes Father     Stroke Father     Diabetes Sister     Cancer Brother 50        pancreas    Drug abuse Brother     Prostate cancer Neg Hx     Macular degeneration Neg Hx     Retinal detachment Neg Hx     Strabismus Neg Hx     Glaucoma Neg Hx     Blindness Neg Hx     Amblyopia Neg Hx     Kidney disease Neg Hx     Mental illness Neg Hx     Mental retardation Neg Hx     COPD Neg Hx     Asthma Neg Hx        Social History     Socioeconomic History    Marital status:      Spouse name: Not on file    Number of children: 0    Years of education: Not on file    Highest education level: Not on file   Occupational History    Occupation: retired     Comment: teacher   Social Needs    Financial resource strain: Not on file    Food insecurity:     Worry: Not on file     Inability: Not on file    Transportation needs:     Medical: Not on file     Non-medical: Not on file   Tobacco Use    Smoking status: Never Smoker    Smokeless tobacco: Never Used   Substance and Sexual  Activity    Alcohol use: Yes     Alcohol/week: 1.0 standard drinks     Types: 1 Glasses of wine per week     Comment: rarely  No alcohol prior to surgery    Drug use: No    Sexual activity: Yes     Partners: Female     Birth control/protection: Condom   Lifestyle    Physical activity:     Days per week: Not on file     Minutes per session: Not on file    Stress: Not on file   Relationships    Social connections:     Talks on phone: Not on file     Gets together: Not on file     Attends Buddhist service: Not on file     Active member of club or organization: Not on file     Attends meetings of clubs or organizations: Not on file     Relationship status: Not on file   Other Topics Concern    Not on file   Social History Narrative    Single lives alone. No children. Retired but some part time work - 4 hours a day. 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 Caffeine intake - sugar free cola, Rare coffee intake. Still drives. Does have a Living Will . Has a nephew Jt Gayle, lives in [place]. Has a friend Karina Rossi, locally who could help him.        Past Surgical History:   Procedure Laterality Date    BACK SURGERY      HERNIA REPAIR Bilateral 04/18/2017    JOINT REPLACEMENT Left 10/09/2017    L YAHIR Dr. Alcocer    LAMINECTOMY  07/22/2016    left elbow  10/2017    procedure to remove gout    lumbar foraminotomy  03/2018    REVISION TOTAL HIP ARTHROPLASTY Left 9/11/2018    Procedure: REVISION, TOTAL ARTHROPLASTY, HIP;  Surgeon: Albaro Alcocer Sr., MD;  Location: [facility];  Service: Orthopedics;  Laterality: Left;    ROTATOR CUFF REPAIR Left 2006    SHOULDER ARTHROSCOPY W/ ROTATOR CUFF REPAIR Right 2009       Review of Systems   Constitutional: Negative for chills, fatigue and fever.   HENT: Negative for hearing loss.    Eyes: Negative for photophobia and visual disturbance.   Respiratory: Negative for cough, chest tightness, shortness of breath and wheezing.    Cardiovascular: Positive for leg swelling.  "Negative for chest pain and palpitations.   Gastrointestinal: Negative for constipation, diarrhea, nausea and vomiting.   Endocrine: Negative for cold intolerance and heat intolerance.   Genitourinary: Negative for flank pain.   Musculoskeletal: Positive for arthralgias and gait problem. Negative for neck pain and neck stiffness.   Skin: Negative for wound.   Neurological: Positive for numbness. Negative for light-headedness and headaches.   Psychiatric/Behavioral: Negative for sleep disturbance.          Objective:   /66 (BP Location: Right arm, Patient Position: Sitting, BP Method: Large (Automatic))   Pulse 75   Ht 6' 3" (1.905 m)   Wt 104.3 kg (229 lb 15 oz)   BMI 28.74 kg/m²     Physical Exam  LOWER EXTREMITY PHYSICAL EXAMINATION    VASCULAR: Capillary refill time is within normal limits.  There are copious varicosities noted. Spider veins are noted to the bilateral lower extremity. Edema is noted, 2+ pitting. Proximal to distal temperature gradient is warm to cool. Palpation of pulses to the lower extremity is diminished on the left and right. The left dorsalis pedis pulse is 1/4 and on the right is 1/4. The left posterior tibial pulse is 0/4 on the left and is 0/4 on the right. Hair growth is diminished to absent on the bilateral dorsal foot and at the digits.     NEUROLOGY: Protective sensation is not intact to the left and right plantar surfaces of the foot and digits, as the patient has no sensation/detection at greater than 4 distinct points of contact with 5.07 [place] monofilament. Sensation to light touch is intact on the left and right foot. Proprioception is intact, bilateral. Sensation to pin prick is reduced to absent. Vibratory sensation is diminished    DERMATOLOGY: On the left foot, nails 1 are suggestive of onychomycotic changes. On the right foot, nails 1 are suggestive of onychomycotic changes. These nail plates are thickened, are dystrophic, chaulky in appearance and " malodorous with substantial subungual debris. These nail plates are yellow to brown in appearance. The remaining nail plates are elongated and do not have suggestive clinical features of onychomycosis.     ORTHOPEDIC: Manual Muscle Testing is 5/5 in all planes on the left and right, without pains, with and without resistance. Gait pattern is non-antalgic.    Assessment:     1. Encounter for comprehensive diabetic foot examination, type 2 diabetes mellitus    2. Type 2 diabetes mellitus with diabetic polyneuropathy, without long-term current use of insulin    3. Type 2 diabetes mellitus with diabetic peripheral angiopathy without gangrene, without long-term current use of insulin    4. Onychomycosis    5. Venous insufficiency (chronic) (peripheral)        Plan:     Type 2 diabetes mellitus with diabetic polyneuropathy, without long-term current use of insulin  Type 2 diabetes mellitus with diabetic peripheral angiopathy without gangrene, without long-term current use of insulin  Encounter for comprehensive diabetic foot examination, type 2 diabetes mellitus   I counseled the patient on his/her Diabetic Mellitus regarding today's clinical examination and objection findings. We did also discuss recent medication changes, pertinent labs and imaging evaluations and other medical consultation notes and progress notes. Greater than 50% of this visit was spent on counseling and coordination of care. Greater than 20 minutes of this appt. was spent on education about the diabetic foot, in relation to PVD and/or neuropathy, and the prevention of limb loss.     Shoe gear is inspected and wear and proper fit/type. Patient is reminded of the importance of good nutrition and blood sugar control to help prevent podiatric complications of diabetes. Patient instructed on proper foot hygeine. We discussed wearing proper shoe gear, daily foot inspections, never walking without protective shoe gear, never putting sharp instruments to  feet.  Patient  will continue to monitor the areas daily, inspect feet, wear protective shoe gear when ambulatory, moisturizer to maintain skin integrity.     Patient's DMI/DMII is managed by Primary Care Provider and/or Endocrinology Advanced Practice Provider. As per the most recent glycohemoglobin level, this patient is at goal.    Onychomycosis  The onychomycotic nail plates, as outlined above, are sharply debrided with double action nail nipper, and/or with the assistance of a mechanical rotary gamaliel, with removal of all offending nail and nail border(s), for reduction of pains. Nails are reduced in terms of length, width and girth with removal of subungual debris to facilitate pain free weight bearing and ambulation. The elongated nails as outlined in the objective portion of this note, were trimmed to appropriate length, with a double action nail nipper, for alleviation/reduction of pains as well. Follow up in approx. 3-4 months.    Venous insufficiency (chronic) (peripheral)  Did discuss possible initiation and/or continuation of lower extremity compression therapy (stockings).  Recommend continue w/ limb elevation.  Continue oral diuretic if no overt contraindication.  I encouraged the patient to follow-up and discuss with his/her PCP.       Protective Sensation (w/ 10 gram monofilament):  Right: Absent  Left: Absent    Visual Inspection:  Dry Skin -  Bilateral and Onychomycosis -  Bilateral    Pedal Pulses:   Right: Diminshed  Left: Diminshed    Posterior tibialis:   Right:Absent  Left: Absent            Future Appointments   Date Time Provider Department Center   11/19/2019 11:00 AM Megha George NP [facility] HEM ONC [facility]   11/26/2019  1:30 PM Elver Cobos MD [facility] CARDIO [facility]   3/12/2020  1:30 PM Tyshawn Lester DPM [facility] POD [facility]   5/20/2020  9:30 AM Amol New OD [facility] OPHTHAL [facility]   11/3/2020 10:50 AM [facility]LENA [facility] [place]   11/3/2020 11:00 AM ON  US[facility] SHARONPERLA [place]   11/9/2020  9:40 AM Chauncey Amador IV, MD ON UROLOGY [facility]

## 2019-11-18 ENCOUNTER — HOSPITAL ENCOUNTER (OUTPATIENT)
Dept: RADIOLOGY | Facility: HOSPITAL | Age: 79
Discharge: HOME | End: 2019-11-18
Attending: INTERNAL MEDICINE
Payer: MEDICARE

## 2019-11-18 ENCOUNTER — OFFICE VISIT (OUTPATIENT)
Dept: INTERNAL MEDICINE | Facility: CLINIC | Age: 79
End: 2019-11-18
Payer: MEDICARE

## 2019-11-18 VITALS
WEIGHT: 227.31 LBS | OXYGEN SATURATION: 98 % | SYSTOLIC BLOOD PRESSURE: 110 MMHG | BODY MASS INDEX: 28.41 KG/M2 | DIASTOLIC BLOOD PRESSURE: 66 MMHG | TEMPERATURE: 97 F | HEART RATE: 76 BPM

## 2019-11-18 DIAGNOSIS — S09.90XA INJURY OF HEAD, INITIAL ENCOUNTER: ICD-10-CM

## 2019-11-18 DIAGNOSIS — M54.2 NECK PAIN: ICD-10-CM

## 2019-11-18 DIAGNOSIS — G44.52 NEW DAILY PERSISTENT HEADACHE: ICD-10-CM

## 2019-11-18 DIAGNOSIS — S09.90XA INJURY OF HEAD, INITIAL ENCOUNTER: Primary | ICD-10-CM

## 2019-11-18 DIAGNOSIS — S16.1XXA STRAIN OF NECK MUSCLE, INITIAL ENCOUNTER: ICD-10-CM

## 2019-11-18 PROCEDURE — 70450 CT HEAD/BRAIN W/O DYE: CPT | Mod: 26,HCNC,, | Performed by: RADIOLOGY

## 2019-11-18 PROCEDURE — 99999 PR PBB SHADOW E&M-EST. PATIENT-LVL III: ICD-10-PCS | Mod: PBBFAC,HCNC,, | Performed by: INTERNAL MEDICINE

## 2019-11-18 PROCEDURE — 99214 OFFICE O/P EST MOD 30 MIN: CPT | Mod: HCNC,S$GLB,, | Performed by: INTERNAL MEDICINE

## 2019-11-18 PROCEDURE — 70450 CT HEAD WITHOUT CONTRAST: ICD-10-PCS | Mod: 26,HCNC,, | Performed by: RADIOLOGY

## 2019-11-18 PROCEDURE — 72040 X-RAY EXAM NECK SPINE 2-3 VW: CPT | Mod: TC,HCNC

## 2019-11-18 PROCEDURE — 99214 PR OFFICE/OUTPT VISIT, EST, LEVL IV, 30-39 MIN: ICD-10-PCS | Mod: HCNC,S$GLB,, | Performed by: INTERNAL MEDICINE

## 2019-11-18 PROCEDURE — 3288F FALL RISK ASSESSMENT DOCD: CPT | Mod: HCNC,CPTII,S$GLB, | Performed by: INTERNAL MEDICINE

## 2019-11-18 PROCEDURE — 3078F DIAST BP <80 MM HG: CPT | Mod: HCNC,CPTII,S$GLB, | Performed by: INTERNAL MEDICINE

## 2019-11-18 PROCEDURE — 1100F PTFALLS ASSESS-DOCD GE2>/YR: CPT | Mod: HCNC,CPTII,S$GLB, | Performed by: INTERNAL MEDICINE

## 2019-11-18 PROCEDURE — 99999 PR PBB SHADOW E&M-EST. PATIENT-LVL III: CPT | Mod: PBBFAC,HCNC,, | Performed by: INTERNAL MEDICINE

## 2019-11-18 PROCEDURE — 72040 X-RAY EXAM NECK SPINE 2-3 VW: CPT | Mod: 26,HCNC,, | Performed by: RADIOLOGY

## 2019-11-18 PROCEDURE — 1100F PR PT FALLS ASSESS DOC 2+ FALLS/FALL W/INJURY/YR: ICD-10-PCS | Mod: HCNC,CPTII,S$GLB, | Performed by: INTERNAL MEDICINE

## 2019-11-18 PROCEDURE — 3074F SYST BP LT 130 MM HG: CPT | Mod: HCNC,CPTII,S$GLB, | Performed by: INTERNAL MEDICINE

## 2019-11-18 PROCEDURE — 72040 XR CERVICAL SPINE AP LATERAL: ICD-10-PCS | Mod: 26,HCNC,, | Performed by: RADIOLOGY

## 2019-11-18 PROCEDURE — 3078F PR MOST RECENT DIASTOLIC BLOOD PRESSURE < 80 MM HG: ICD-10-PCS | Mod: HCNC,CPTII,S$GLB, | Performed by: INTERNAL MEDICINE

## 2019-11-18 PROCEDURE — 3074F PR MOST RECENT SYSTOLIC BLOOD PRESSURE < 130 MM HG: ICD-10-PCS | Mod: HCNC,CPTII,S$GLB, | Performed by: INTERNAL MEDICINE

## 2019-11-18 PROCEDURE — 70450 CT HEAD/BRAIN W/O DYE: CPT | Mod: TC,HCNC

## 2019-11-18 PROCEDURE — 3288F PR FALLS RISK ASSESSMENT DOCUMENTED: ICD-10-PCS | Mod: HCNC,CPTII,S$GLB, | Performed by: INTERNAL MEDICINE

## 2019-11-18 RX ORDER — CYCLOBENZAPRINE HCL 10 MG
TABLET ORAL
Qty: 30 TABLET | Refills: 0 | Status: SHIPPED | OUTPATIENT
Start: 2019-11-18 | End: 2020-01-09

## 2019-11-18 RX ORDER — METFORMIN HYDROCHLORIDE 1000 MG/1
TABLET ORAL
Qty: 180 TABLET | Refills: 3 | Status: SHIPPED | OUTPATIENT
Start: 2019-11-18 | End: 2020-11-18

## 2019-11-18 NOTE — PATIENT INSTRUCTIONS
Tylenol 500 to 1000 mg every 8 hours as needed for pain for for the next week.     First Aid: Head Injuries  A strong blow to the head may cause swelling and bleeding inside the skull. The resulting pressure can injure the brain (concussion). If you have any doubts identifying a concussion, have a healthcare provider check the victim.  Seek medical help if any of the following is true:  · The victim loses consciousness.  · The victim has convulsions or seizures.  · The victim has unequal pupil size (the black part in the center of th eye is bigger one one side than the other)  · The victim shows any of the following signs of concussion:  ¨ confusion or inability to follow normal conversation  ¨ slurred speech  ¨ dizziness or vision problems  ¨ nausea or vomiting  ¨ muscle weakness or loss of mobility  ¨ memory loss  ¨ sensitivity to noise  ¨ fatigue  Call 911 immediately if the victim has any of the following:  · Prolonged loss of consciousness  · A depressed or spongy area in the skull, or visible bone fragments  · Clear fluid draining out of  the ears or nose  While you wait for help:  1. Reassure the person.  2. Treat for shock by maintaining body temperature and keeping the victim calm.  3. Provide rescue breathing or CPR, if needed.  4. If the person has neck or back pain or is unconscious, he or she might have a spine fracture. They should only  be moved with great precaution and if it is absolutely necessary.   Step 1: Control bleeding  · Apply direct pressure to control bleeding. (Wear gloves or use other protection to avoid contact with victim's blood.)  · Wash a minor surface injury with soap and water after the bleeding stops or is reduced.  · Cover the wound with a clean dressing and bandage.  Step 2: Ice bumps and bruises  · Place a cold pack or ice on the injury to reduce swelling and pain. Placing a cloth between the injury and the ice pack helps prevent tissue damage from severe cold.  Step 3:  Observe the victim  · Watch for vomiting or changes in mood or alertness. If you notice changes, call for medical help. Signs of concussion may not appear for up to 48 hours.  · Tell the person's partner, parent, or roommate about the injury so he or she can continue to observe the victim.  Stitches  If a cut is deep or continues to bleed, or the edges of skin do not stay together evenly, the wound may need to be closed with stitches, tape, staples, or medical glue. All can help speed healing and reduce the risk of infection and the size of the scar. These may be especially important concerns with large wounds, and wounds on the head or other visible body parts.  If you think a wound may need medical care, visit a health care professional as soon as possible. If stitches are needed, they must be applied in the first few hours. A wound that is not properly closed is at risk of serious infection.  Date Last Reviewed: 10/19/2015  © 4496-7041 [employer]. [address] [place] [address]. All rights reserved. This information is not intended as a substitute for professional medical care. Always follow your healthcare professional's instructions.        First Aid: Head Injuries  A strong blow to the head may cause swelling and bleeding inside the skull. The resulting pressure can injure the brain (concussion). If you have any doubts identifying a concussion, have a healthcare provider check the victim.  Seek medical help if any of the following is true:  · The victim loses consciousness.  · The victim has convulsions or seizures.  · The victim has unequal pupil size (the black part in the center of th eye is bigger one one side than the other)  · The victim shows any of the following signs of concussion:  ¨ confusion or inability to follow normal conversation  ¨ slurred speech  ¨ dizziness or vision problems  ¨ nausea or vomiting  ¨ muscle weakness or loss of mobility  ¨ memory loss  ¨ sensitivity  to noise  ¨ fatigue  Call 911 immediately if the victim has any of the following:  · Prolonged loss of consciousness  · A depressed or spongy area in the skull, or visible bone fragments  · Clear fluid draining out of  the ears or nose  While you wait for help:  5. Reassure the person.  6. Treat for shock by maintaining body temperature and keeping the victim calm.  7. Provide rescue breathing or CPR, if needed.  8. If the person has neck or back pain or is unconscious, he or she might have a spine fracture. They should only  be moved with great precaution and if it is absolutely necessary.   Step 1: Control bleeding  · Apply direct pressure to control bleeding. (Wear gloves or use other protection to avoid contact with victim's blood.)  · Wash a minor surface injury with soap and water after the bleeding stops or is reduced.  · Cover the wound with a clean dressing and bandage.  Step 2: Ice bumps and bruises  · Place a cold pack or ice on the injury to reduce swelling and pain. Placing a cloth between the injury and the ice pack helps prevent tissue damage from severe cold.  Step 3: Observe the victim  · Watch for vomiting or changes in mood or alertness. If you notice changes, call for medical help. Signs of concussion may not appear for up to 48 hours.  · Tell the person's partner, parent, or roommate about the injury so he or she can continue to observe the victim.  Stitches  If a cut is deep or continues to bleed, or the edges of skin do not stay together evenly, the wound may need to be closed with stitches, tape, staples, or medical glue. All can help speed healing and reduce the risk of infection and the size of the scar. These may be especially important concerns with large wounds, and wounds on the head or other visible body parts.  If you think a wound may need medical care, visit a health care professional as soon as possible. If stitches are needed, they must be applied in the first few hours. A wound  that is not properly closed is at risk of serious infection.  Date Last Reviewed: 10/19/2015  © 0890-6302 [employer]. [address] [place] [address], [place] [address]. All rights reserved. This information is not intended as a substitute for professional medical care. Always follow your healthcare professional's instructions.

## 2019-11-18 NOTE — PROGRESS NOTES
Subjective:      Patient ID: Srinath Mcknight is a 78 y.o. male.    Chief Complaint: Fall    HPI   79 yo with   Patient Active Problem List   Diagnosis    Combined hyperlipidemia associated with type 2 diabetes mellitus    Coronary artery disease, occlusive    Essential hypertension    Osteoarthritis of spine with radiculopathy, lumbar region    Primary osteoarthritis of left hip    Aortic calcification    Anemia due to unknown mechanism    Pre-operative cardiovascular examination    Type 2 diabetes mellitus with diabetic polyneuropathy, without long-term current use of insulin    PLMD (periodic limb movement disorder)    CHARLES (obstructive sleep apnea)    Arthritis of left knee    Chronic bilateral low back pain with bilateral sciatica    Lumbar spondylosis    Idiopathic chronic gout of right foot without tophus    Sacroiliac joint pain    Chronic anemia    Elevated serum creatinine    Leukopenia    Type 2 diabetes mellitus with diabetic peripheral angiopathy without gangrene, without long-term current use of insulin    Chronic hip pain after total replacement of left hip joint    Benign prostatic hyperplasia     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 Hit  back of head on a stand. No LOC.   No bruising or knots. Felt fine afterwards. Went to PT on Wed. No problems at PT. Thursday began with stiff neck.  Began with digna  neck and digna occipital head pain Friday.  Worsening over past 3 days. Head ache is constant. Neck pain is with certain movements.     Denies confusion. No change in MS or alertness. No vision changes. No dizziness.   Not taking NSAIDs.     Review of Systems   Constitutional: Negative for chills, diaphoresis, fatigue and fever.   HENT: Negative for ear pain and sore throat.    Eyes: Negative for pain, redness and visual disturbance.   Respiratory: Negative for cough, shortness of breath and wheezing.    Cardiovascular: Negative for chest pain, palpitations and leg swelling.   Gastrointestinal: Negative for abdominal pain and blood in stool.   Genitourinary: Negative for dysuria and hematuria.   Musculoskeletal: Positive for myalgias, neck pain and neck stiffness. Negative for back pain.   Skin: Negative for rash and wound.   Neurological: Positive for headaches. Negative for dizziness, tremors, seizures, syncope, facial asymmetry, speech difficulty, weakness, light-headedness and numbness.     Objective:   /66 (BP Location: Right arm, Patient Position: Sitting, BP Method: Large (Manual))   Pulse 76   Temp 97 °F (36.1 °C) (Tympanic)   Wt 103.1 kg (227 lb 4.7 oz)   SpO2 98%   BMI 28.41 kg/m²     Physical Exam   Constitutional: He is oriented to person, place, and time. He appears well-developed and well-nourished. No distress.   HENT:   Head: Normocephalic and atraumatic.   Right Ear: External ear normal.   Left Ear: External ear normal.   Mouth/Throat: Oropharynx is clear and moist.   Eyes: Pupils are equal, round, and reactive to light. EOM are normal.   Neck: Neck supple. Muscular tenderness present. No spinous process tenderness present. No neck rigidity. Decreased range of motion present. No thyromegaly present.       Bilateral neck pain  reproduced with neck extension and bilateral rotation.   Cardiovascular: Normal rate and regular rhythm.   Pulmonary/Chest: Effort normal and breath sounds normal. He has no wheezes. He has no rales.   Abdominal: Soft. Bowel sounds are normal. There is no tenderness.   Musculoskeletal: He exhibits no edema.   Lymphadenopathy:     He has no cervical adenopathy.   Neurological: He is alert and oriented to person, place, and time. No cranial nerve deficit or sensory deficit. He exhibits normal muscle tone. Coordination normal.   Good finger-to-nose.  Romberg negative.   Skin: Skin is warm and dry.   Psychiatric: He has a normal mood and affect. His behavior is normal.     Stat Ct head negative today.    Assessment:     1. Injury of head, initial encounter    2. Strain of neck muscle, initial encounter    3. Neck pain    4. New daily persistent headache      Plan:   Injury of head, initial encounter  -     CT Head Without Contrast; Future; Expected date: 11/18/2019  -     X-Ray Cervical Spine AP And Lateral; Future; Expected date: 11/18/2019    Strain of neck muscle, initial encounter  -     cyclobenzaprine (FLEXERIL) 10 MG tablet; 1/2 to one tab po qhs prn muscle spasm  Dispense: 30 tablet; Refill: 0    Neck pain  -     X-Ray Cervical Spine AP And Lateral; Future; Expected date: 11/18/2019    New daily persistent headache  -     CT Head Without Contrast; Future; Expected date: 11/18/2019      Tylenol 500 to 1000 mg every 8 hours as needed for pain for for the next week.   Lab Frequency Next Occurrence   X-Ray Hip 2 View Left Once 03/25/2019   Alpha-Globin Gene Analysis Once 05/20/2019   PSA, Screening Once 11/06/2019   US Retroperitoneal Complete (Kidney and Once 11/06/2019       Problem List Items Addressed This Visit     None      Visit Diagnoses     Injury of head, initial encounter    -  Primary    Relevant Orders    CT Head Without Contrast (Completed)    X-Ray Cervical Spine AP And Lateral    Strain of neck  muscle, initial encounter        Relevant Medications    cyclobenzaprine (FLEXERIL) 10 MG tablet    Neck pain        Relevant Orders    X-Ray Cervical Spine AP And Lateral    New daily persistent headache        Relevant Orders    CT Head Without Contrast (Completed)          Follow up if symptoms worsen or fail to improve.

## 2019-11-19 ENCOUNTER — LAB VISIT (OUTPATIENT)
Dept: LAB | Facility: HOSPITAL | Age: 79
End: 2019-11-19
Attending: NURSE PRACTITIONER
Payer: MEDICARE

## 2019-11-19 ENCOUNTER — OFFICE VISIT (OUTPATIENT)
Dept: HEMATOLOGY/ONCOLOGY | Facility: CLINIC | Age: 79
End: 2019-11-19
Payer: MEDICARE

## 2019-11-19 VITALS
HEIGHT: 73 IN | WEIGHT: 228.38 LBS | HEART RATE: 77 BPM | BODY MASS INDEX: 30.27 KG/M2 | DIASTOLIC BLOOD PRESSURE: 73 MMHG | RESPIRATION RATE: 18 BRPM | OXYGEN SATURATION: 100 % | TEMPERATURE: 97 F | SYSTOLIC BLOOD PRESSURE: 129 MMHG

## 2019-11-19 DIAGNOSIS — D53.9 NUTRITIONAL ANEMIA: ICD-10-CM

## 2019-11-19 DIAGNOSIS — D64.9 ANEMIA DUE TO UNKNOWN MECHANISM: ICD-10-CM

## 2019-11-19 DIAGNOSIS — D64.9 CHRONIC ANEMIA: ICD-10-CM

## 2019-11-19 DIAGNOSIS — D64.9 ANEMIA DUE TO UNKNOWN MECHANISM: Primary | ICD-10-CM

## 2019-11-19 LAB
ANION GAP SERPL CALC-SCNC: 9 MMOL/L (ref 8–16)
BASOPHILS # BLD AUTO: 0.03 K/UL (ref 0–0.2)
BASOPHILS NFR BLD: 0.7 % (ref 0–1.9)
BUN SERPL-MCNC: 16 MG/DL (ref 8–23)
CALCIUM SERPL-MCNC: 8.9 MG/DL (ref 8.7–10.5)
CHLORIDE SERPL-SCNC: 108 MMOL/L (ref 95–110)
CO2 SERPL-SCNC: 27 MMOL/L (ref 23–29)
CREAT SERPL-MCNC: 1.5 MG/DL (ref 0.5–1.4)
DIFFERENTIAL METHOD: ABNORMAL
EOSINOPHIL # BLD AUTO: 0.3 K/UL (ref 0–0.5)
EOSINOPHIL NFR BLD: 7.7 % (ref 0–8)
ERYTHROCYTE [DISTWIDTH] IN BLOOD BY AUTOMATED COUNT: 15 % (ref 11.5–14.5)
EST. GFR  (AFRICAN AMERICAN): 52 ML/MIN/1.73 M^2
EST. GFR  (NON AFRICAN AMERICAN): 45 ML/MIN/1.73 M^2
FERRITIN SERPL-MCNC: 18 NG/ML (ref 20–300)
FOLATE SERPL-MCNC: 19.3 NG/ML (ref 4–24)
GLUCOSE SERPL-MCNC: 205 MG/DL (ref 70–110)
HCT VFR BLD AUTO: 37.7 % (ref 40–54)
HGB BLD-MCNC: 12.1 G/DL (ref 14–18)
IMM GRANULOCYTES # BLD AUTO: 0.01 K/UL (ref 0–0.04)
IMM GRANULOCYTES NFR BLD AUTO: 0.2 % (ref 0–0.5)
IRON SERPL-MCNC: 97 UG/DL (ref 45–160)
LYMPHOCYTES # BLD AUTO: 1.7 K/UL (ref 1–4.8)
LYMPHOCYTES NFR BLD: 41.4 % (ref 18–48)
MCH RBC QN AUTO: 27.8 PG (ref 27–31)
MCHC RBC AUTO-ENTMCNC: 32.1 G/DL (ref 32–36)
MCV RBC AUTO: 87 FL (ref 82–98)
MONOCYTES # BLD AUTO: 0.4 K/UL (ref 0.3–1)
MONOCYTES NFR BLD: 9.2 % (ref 4–15)
NEUTROPHILS # BLD AUTO: 1.7 K/UL (ref 1.8–7.7)
NEUTROPHILS NFR BLD: 41 % (ref 38–73)
NRBC BLD-RTO: 0 /100 WBC
PLATELET # BLD AUTO: 268 K/UL (ref 150–350)
PMV BLD AUTO: 9.7 FL (ref 9.2–12.9)
POTASSIUM SERPL-SCNC: 4.6 MMOL/L (ref 3.5–5.1)
RBC # BLD AUTO: 4.35 M/UL (ref 4.6–6.2)
SATURATED IRON: 26 % (ref 20–50)
SODIUM SERPL-SCNC: 144 MMOL/L (ref 136–145)
TOTAL IRON BINDING CAPACITY: 369 UG/DL (ref 250–450)
TRANSFERRIN SERPL-MCNC: 249 MG/DL (ref 200–375)
VIT B12 SERPL-MCNC: 217 PG/ML (ref 210–950)
WBC # BLD AUTO: 4.15 K/UL (ref 3.9–12.7)

## 2019-11-19 PROCEDURE — 80048 BASIC METABOLIC PNL TOTAL CA: CPT | Mod: HCNC

## 2019-11-19 PROCEDURE — 1100F PR PT FALLS ASSESS DOC 2+ FALLS/FALL W/INJURY/YR: ICD-10-PCS | Mod: HCNC,CPTII,S$GLB, | Performed by: NURSE PRACTITIONER

## 2019-11-19 PROCEDURE — 3288F PR FALLS RISK ASSESSMENT DOCUMENTED: ICD-10-PCS | Mod: HCNC,CPTII,S$GLB, | Performed by: NURSE PRACTITIONER

## 2019-11-19 PROCEDURE — 1125F AMNT PAIN NOTED PAIN PRSNT: CPT | Mod: HCNC,S$GLB,, | Performed by: NURSE PRACTITIONER

## 2019-11-19 PROCEDURE — 3078F DIAST BP <80 MM HG: CPT | Mod: HCNC,CPTII,S$GLB, | Performed by: NURSE PRACTITIONER

## 2019-11-19 PROCEDURE — 85025 COMPLETE CBC W/AUTO DIFF WBC: CPT | Mod: HCNC

## 2019-11-19 PROCEDURE — 99214 PR OFFICE/OUTPT VISIT, EST, LEVL IV, 30-39 MIN: ICD-10-PCS | Mod: HCNC,S$GLB,, | Performed by: NURSE PRACTITIONER

## 2019-11-19 PROCEDURE — 82728 ASSAY OF FERRITIN: CPT | Mod: HCNC

## 2019-11-19 PROCEDURE — 99999 PR PBB SHADOW E&M-EST. PATIENT-LVL III: ICD-10-PCS | Mod: PBBFAC,HCNC,, | Performed by: NURSE PRACTITIONER

## 2019-11-19 PROCEDURE — 99214 OFFICE O/P EST MOD 30 MIN: CPT | Mod: HCNC,S$GLB,, | Performed by: NURSE PRACTITIONER

## 2019-11-19 PROCEDURE — 36415 COLL VENOUS BLD VENIPUNCTURE: CPT | Mod: HCNC

## 2019-11-19 PROCEDURE — 99999 PR PBB SHADOW E&M-EST. PATIENT-LVL III: CPT | Mod: PBBFAC,HCNC,, | Performed by: NURSE PRACTITIONER

## 2019-11-19 PROCEDURE — 3074F SYST BP LT 130 MM HG: CPT | Mod: HCNC,CPTII,S$GLB, | Performed by: NURSE PRACTITIONER

## 2019-11-19 PROCEDURE — 1159F PR MEDICATION LIST DOCUMENTED IN MEDICAL RECORD: ICD-10-PCS | Mod: HCNC,S$GLB,, | Performed by: NURSE PRACTITIONER

## 2019-11-19 PROCEDURE — 3078F PR MOST RECENT DIASTOLIC BLOOD PRESSURE < 80 MM HG: ICD-10-PCS | Mod: HCNC,CPTII,S$GLB, | Performed by: NURSE PRACTITIONER

## 2019-11-19 PROCEDURE — 1159F MED LIST DOCD IN RCRD: CPT | Mod: HCNC,S$GLB,, | Performed by: NURSE PRACTITIONER

## 2019-11-19 PROCEDURE — 3074F PR MOST RECENT SYSTOLIC BLOOD PRESSURE < 130 MM HG: ICD-10-PCS | Mod: HCNC,CPTII,S$GLB, | Performed by: NURSE PRACTITIONER

## 2019-11-19 PROCEDURE — 3288F FALL RISK ASSESSMENT DOCD: CPT | Mod: HCNC,CPTII,S$GLB, | Performed by: NURSE PRACTITIONER

## 2019-11-19 PROCEDURE — 1100F PTFALLS ASSESS-DOCD GE2>/YR: CPT | Mod: HCNC,CPTII,S$GLB, | Performed by: NURSE PRACTITIONER

## 2019-11-19 PROCEDURE — 82746 ASSAY OF FOLIC ACID SERUM: CPT | Mod: HCNC

## 2019-11-19 PROCEDURE — 82607 VITAMIN B-12: CPT | Mod: HCNC

## 2019-11-19 PROCEDURE — 1125F PR PAIN SEVERITY QUANTIFIED, PAIN PRESENT: ICD-10-PCS | Mod: HCNC,S$GLB,, | Performed by: NURSE PRACTITIONER

## 2019-11-19 PROCEDURE — 83540 ASSAY OF IRON: CPT | Mod: HCNC

## 2019-11-19 NOTE — ASSESSMENT & PLAN NOTE
Patient will have labs drawn on his way out. Communicate results via patient portal. If stable will plan to f/u 3 months with repeat labs

## 2019-11-19 NOTE — PROGRESS NOTES
Subjective:       Patient ID: Srinath Mcknight is a 78 y.o. male.    Chief Complaint: F/U anemia    HPI:  78 y.o. Male with HTN, OA, chronic anemia, DM, CAD, HLD follows up in the Heme-Onc clinic for h/o chronic anemia of unknown origin. The patient has had workup for chronic anemia of unknown origin. The laboratory studies are unremarkable. The patient has two negative stool occult blood testing on file. The patient continues to take daily iron pills. He tells me he was instructed to take iron pills by his PCP but has no knowledge of iron deficiency history.  Upon review of the medical record the patient has not had documented iron deficiency but iron levels are low normal. Patient does have sickle cell trait. He tells me this was already known to him.     Today's visit: Patient has not yet had labs drawn for today's visit. Patient had labs done 9/2019 but did not make clinic appointment due to having back surgery the day prior to the appointment. Reports having ongoing medical issues following his back surgery that has prolonged his follow up appointment time. He continues to take oral iron replacement      Social History     Socioeconomic History    Marital status:      Spouse name: Not on file    Number of children: 0    Years of education: Not on file    Highest education level: Not on file   Occupational History    Occupation: retired     Comment: teacher   Social Needs    Financial resource strain: Not on file    Food insecurity:     Worry: Not on file     Inability: Not on file    Transportation needs:     Medical: Not on file     Non-medical: Not on file   Tobacco Use    Smoking status: Never Smoker    Smokeless tobacco: Never Used   Substance and Sexual Activity    Alcohol use: Yes     Alcohol/week: 1.0 standard drinks     Types: 1 Glasses of wine per week     Comment: rarely  No alcohol prior to surgery    Drug use: No    Sexual activity: Yes     Partners: Female     Birth  control/protection: Condom   Lifestyle    Physical activity:     Days per week: Not on file     Minutes per session: Not on file    Stress: Not on file   Relationships    Social connections:     Talks on phone: Not on file     Gets together: Not on file     Attends Catholic service: Not on file     Active member of club or organization: Not on file     Attends meetings of clubs or organizations: Not on file     Relationship status: Not on file   Other Topics Concern    Not on file   Social History Narrative    Single lives alone. No children. 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 Caffeine intake - sugar free cola, Rare coffee intake. Still drives. Does have a Living Will . Has a nephew Jt Gayle, lives in [place]. Has a friend Karina Rossi, locally who could help him.        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    Trouble in sleeping     Type 2 diabetes mellitus with diabetic polyneuropathy, without long-term current use of insulin     Urinary incontinence     Uses hearing aid     bilat       Family History   Problem Relation Age of Onset    Hypertension Mother     Heart disease Mother     Diabetes Mother     Hypertension Father     Heart disease Father     Diabetes Father     Stroke Father     Diabetes Sister     Cancer Brother 50        pancreas    Drug abuse Brother     Prostate cancer Neg Hx     Macular degeneration Neg Hx     Retinal detachment  Neg Hx     Strabismus Neg Hx     Glaucoma Neg Hx     Blindness Neg Hx     Amblyopia Neg Hx     Kidney disease Neg Hx     Mental illness Neg Hx     Mental retardation Neg Hx     COPD Neg Hx     Asthma Neg Hx        Past Surgical History:   Procedure Laterality Date    BACK SURGERY      HERNIA REPAIR Bilateral 04/18/2017    JOINT REPLACEMENT Left 10/09/2017    L YAHIR Dr. Alcocer    LAMINECTOMY  07/22/2016    left elbow  10/2017    procedure to remove gout    lumbar foraminotomy  03/2018    REVISION TOTAL HIP ARTHROPLASTY Left 9/11/2018    Procedure: REVISION, TOTAL ARTHROPLASTY, HIP;  Surgeon: Albaro Alcocer Sr., MD;  Location: [facility];  Service: Orthopedics;  Laterality: Left;    ROTATOR CUFF REPAIR Left 2006    SHOULDER ARTHROSCOPY W/ ROTATOR CUFF REPAIR Right 2009       Review of Systems   Constitutional: Negative for activity change, appetite change, chills, diaphoresis, fatigue, fever and unexpected weight change.   HENT: Negative for nosebleeds, sore throat, trouble swallowing and voice change.    Eyes: Negative for visual disturbance.   Respiratory: Negative for cough, chest tightness, shortness of breath and wheezing.    Cardiovascular: Negative for chest pain, palpitations and leg swelling.   Gastrointestinal: Negative for abdominal distention, abdominal pain, anal bleeding, blood in stool, constipation, diarrhea, nausea and vomiting.   Genitourinary: Negative for difficulty urinating, dysuria and hematuria.   Musculoskeletal: Positive for arthralgias and gait problem (utilizes cane). Negative for back pain and myalgias.        Patient walks with a cane   Skin: Negative for rash and wound.   Neurological: Positive for weakness. Negative for dizziness, light-headedness and headaches.   Hematological: Does not bruise/bleed easily.   Psychiatric/Behavioral: The patient is nervous/anxious.          Medication List with Changes/Refills   Current Medications    ACCU-CHEK FRANKO PLUS TEST STRP STRP     TEST BLOOD SUGAR EVERY DAY    ACCU-CHEK SOFTCLIX LANCETS MISC    TEST ONE TIME DAILY    ALLOPURINOL (ZYLOPRIM) 100 MG TABLET    TAKE 1 TABLET EVERY DAY    ASPIRIN (ECOTRIN) 81 MG EC TABLET    Take 1 tablet (81 mg total) by mouth once daily.    BISACODYL (DULCOLAX) 10 MG SUPP        BLOOD-GLUCOSE METER (ACCU-CHEK FRANKO PLUS METER) MISC    1 Device by Misc.(Non-Drug; Combo Route) route once daily.    CELECOXIB (CELEBREX) 200 MG CAPSULE    TAKE 1 CAPSULE(200 MG) BY MOUTH EVERY DAY    CLONAZEPAM (KLONOPIN) 0.5 MG DISINTEGRATING TABLET    DISSOLVE ONE TABLET BY MOUTH EVERY NIGHT AT BEDTIME    CYCLOBENZAPRINE (FLEXERIL) 10 MG TABLET    1/2 to one tab po qhs prn muscle spasm    DOCUSATE SODIUM (COLACE) 100 MG CAPSULE    Take 1 capsule (100 mg total) by mouth 2 (two) times daily as needed.    FERROUS SULFATE 324 MG (65 MG IRON) TBEC    Take 1 tablet (324 mg total) by mouth once daily.    FINASTERIDE (PROSCAR) 5 MG TABLET    Take 1 tablet (5 mg total) by mouth once daily.    GABAPENTIN (NEURONTIN) 300 MG CAPSULE    Take 300 mg by mouth 3 (three) times daily.    GARLIC EXTRACT ORAL    Take 1 tablet by mouth once daily. Per [facility]    GLIPIZIDE (GLUCOTROL) 5 MG TABLET    Take 2 tablets (10 mg total) by mouth 2 (two) times daily with meals.    LOSARTAN (COZAAR) 50 MG TABLET    Take 1 tablet (50 mg total) by mouth once daily.    MELATONIN ORAL    Take by mouth every evening.    METFORMIN (GLUCOPHAGE) 1000 MG TABLET    TAKE 1 TABLET TWICE DAILY WITH MEALS    METOPROLOL SUCCINATE (TOPROL-XL) 50 MG 24 HR TABLET    TAKE 1 TABLET EVERY DAY    MULTIVITAMIN (ONE DAILY MULTIVITAMIN) PER TABLET    Take 1 tablet by mouth once daily.    PRAVASTATIN (PRAVACHOL) 40 MG TABLET    TAKE 1 TABLET EVERY DAY    TADALAFIL (CIALIS) 5 MG TABLET    Take 1 tablet (5 mg total) by mouth daily as needed for Erectile Dysfunction.    TAMSULOSIN (FLOMAX) 0.4 MG CAP    Take 1 capsule (0.4 mg total) by mouth once daily.     Objective:     Vitals:     11/19/19 1105   BP: 129/73   Pulse: 77   Resp: 18   Temp: 97.2 °F (36.2 °C)     Lab Results   Component Value Date    WBC 4.15 11/19/2019    HGB 12.1 (L) 11/19/2019    HCT 37.7 (L) 11/19/2019    MCV 87 11/19/2019     11/19/2019     Lab Results   Component Value Date    IRON 59 09/12/2019    TIBC 284 09/12/2019    FERRITIN 61 09/12/2019     BMP  Lab Results   Component Value Date     11/19/2019    K 4.6 11/19/2019     11/19/2019    CO2 27 11/19/2019    BUN 16 11/19/2019    CREATININE 1.5 (H) 11/19/2019    CALCIUM 8.9 11/19/2019    ANIONGAP 9 11/19/2019    ESTGFRAFRICA 52 (A) 11/19/2019    EGFRNONAA 45 (A) 11/19/2019     Lab Results   Component Value Date    ALT 21 09/12/2019    AST 19 09/12/2019    ALKPHOS 96 09/12/2019    BILITOT 0.4 09/12/2019               Physical Exam   Constitutional: He is oriented to person, place, and time. He appears well-developed and well-nourished. He is cooperative. No distress.   HENT:   Head: Normocephalic.   Right Ear: Hearing normal.   Left Ear: Hearing normal.   Nose: Nose normal.   Mouth/Throat: Oropharynx is clear and moist.   Eyes: Conjunctivae, EOM and lids are normal. Right eye exhibits no discharge. Left eye exhibits no discharge.   Neck: Normal range of motion. No thyroid mass present.   Cardiovascular: Normal rate, regular rhythm and normal heart sounds. Exam reveals no gallop and no friction rub.   No murmur heard.  Pulmonary/Chest: Effort normal and breath sounds normal. No respiratory distress. He has no wheezes. He has no rales. He exhibits no tenderness.   Abdominal: Soft. He exhibits no distension and no mass. There is no tenderness. There is no guarding.   Genitourinary:   Genitourinary Comments: deferred   Musculoskeletal: Normal range of motion. He exhibits no edema.   Patient walks with a cane.    Neurological: He is alert and oriented to person, place, and time.   Skin: Skin is warm, dry and intact.   Psychiatric: His speech is normal and  behavior is normal. Thought content normal.   Vitals reviewed.       Assessment:     Problem List Items Addressed This Visit        Oncology    Anemia due to unknown mechanism - Primary    Relevant Orders    CBC auto differential (Completed)    Basic metabolic panel (Completed)    Iron and TIBC    Ferritin    Vitamin B12    Folate    CBC auto differential    Iron and TIBC    Basic metabolic panel    Chronic anemia     Patient will have labs drawn on his way out. Communicate results via patient portal. If stable will plan to f/u 3 months with repeat labs           Relevant Orders    CBC auto differential (Completed)    Basic metabolic panel (Completed)    Iron and TIBC    Ferritin    Vitamin B12    Folate    CBC auto differential    Iron and TIBC    Basic metabolic panel      Other Visit Diagnoses     Nutritional anemia         Relevant Orders    Vitamin B12    Folate            Plan:     Anemia due to unknown mechanism  -     CBC auto differential; Future; Expected date: 11/19/2019  -     Basic metabolic panel; Future; Expected date: 11/19/2019  -     Iron and TIBC; Future; Expected date: 11/19/2019  -     Ferritin; Future; Expected date: 11/19/2019  -     Vitamin B12; Future; Expected date: 11/19/2019  -     Folate; Future; Expected date: 11/19/2019  -     CBC auto differential; Future; Expected date: 11/19/2019  -     Iron and TIBC; Future; Expected date: 11/19/2019  -     Basic metabolic panel; Future; Expected date: 11/19/2019    Chronic anemia  -     CBC auto differential; Future; Expected date: 11/19/2019  -     Basic metabolic panel; Future; Expected date: 11/19/2019  -     Iron and TIBC; Future; Expected date: 11/19/2019  -     Ferritin; Future; Expected date: 11/19/2019  -     Vitamin B12; Future; Expected date: 11/19/2019  -     Folate; Future; Expected date: 11/19/2019  -     CBC auto differential; Future; Expected date: 11/19/2019  -     Iron and TIBC; Future; Expected date: 11/19/2019  -     Basic  metabolic panel; Future; Expected date: 11/19/2019    Nutritional anemia   -     Vitamin B12; Future; Expected date: 11/19/2019  -     Folate; Future; Expected date: 11/19/2019          I will review assessment/plan with collaborating physician Dr. Clif George, FNP-C

## 2019-11-22 RX ORDER — ALLOPURINOL 100 MG/1
TABLET ORAL
Qty: 90 TABLET | Refills: 0 | Status: SHIPPED | OUTPATIENT
Start: 2019-11-22 | End: 2019-11-25 | Stop reason: SDUPTHER

## 2019-11-22 RX ORDER — BLOOD SUGAR DIAGNOSTIC
STRIP MISCELLANEOUS
Qty: 100 STRIP | Refills: 0 | Status: SHIPPED | OUTPATIENT
Start: 2019-11-22 | End: 2020-08-03

## 2019-11-22 RX ORDER — LANCETS
EACH MISCELLANEOUS
Qty: 100 EACH | Refills: 11 | Status: SHIPPED | OUTPATIENT
Start: 2019-11-22 | End: 2022-07-28

## 2019-11-26 ENCOUNTER — OFFICE VISIT (OUTPATIENT)
Dept: CARDIOLOGY | Facility: CLINIC | Age: 79
End: 2019-11-26
Payer: MEDICARE

## 2019-11-26 VITALS
DIASTOLIC BLOOD PRESSURE: 80 MMHG | SYSTOLIC BLOOD PRESSURE: 140 MMHG | HEART RATE: 82 BPM | WEIGHT: 234.13 LBS | HEIGHT: 73 IN | BODY MASS INDEX: 31.03 KG/M2

## 2019-11-26 DIAGNOSIS — I10 ESSENTIAL HYPERTENSION: Primary | Chronic | ICD-10-CM

## 2019-11-26 DIAGNOSIS — I25.10 CORONARY ARTERY DISEASE, OCCLUSIVE: ICD-10-CM

## 2019-11-26 DIAGNOSIS — E11.69 COMBINED HYPERLIPIDEMIA ASSOCIATED WITH TYPE 2 DIABETES MELLITUS: Chronic | ICD-10-CM

## 2019-11-26 DIAGNOSIS — E78.2 COMBINED HYPERLIPIDEMIA ASSOCIATED WITH TYPE 2 DIABETES MELLITUS: Chronic | ICD-10-CM

## 2019-11-26 PROCEDURE — 3288F FALL RISK ASSESSMENT DOCD: CPT | Mod: HCNC,CPTII,S$GLB, | Performed by: NUCLEAR MEDICINE

## 2019-11-26 PROCEDURE — 1100F PTFALLS ASSESS-DOCD GE2>/YR: CPT | Mod: HCNC,CPTII,S$GLB, | Performed by: NUCLEAR MEDICINE

## 2019-11-26 PROCEDURE — 1159F MED LIST DOCD IN RCRD: CPT | Mod: HCNC,S$GLB,, | Performed by: NUCLEAR MEDICINE

## 2019-11-26 PROCEDURE — 1125F AMNT PAIN NOTED PAIN PRSNT: CPT | Mod: HCNC,S$GLB,, | Performed by: NUCLEAR MEDICINE

## 2019-11-26 PROCEDURE — 3077F SYST BP >= 140 MM HG: CPT | Mod: HCNC,CPTII,S$GLB, | Performed by: NUCLEAR MEDICINE

## 2019-11-26 PROCEDURE — 3079F PR MOST RECENT DIASTOLIC BLOOD PRESSURE 80-89 MM HG: ICD-10-PCS | Mod: HCNC,CPTII,S$GLB, | Performed by: NUCLEAR MEDICINE

## 2019-11-26 PROCEDURE — 99999 PR PBB SHADOW E&M-EST. PATIENT-LVL III: CPT | Mod: PBBFAC,HCNC,, | Performed by: NUCLEAR MEDICINE

## 2019-11-26 PROCEDURE — 3288F PR FALLS RISK ASSESSMENT DOCUMENTED: ICD-10-PCS | Mod: HCNC,CPTII,S$GLB, | Performed by: NUCLEAR MEDICINE

## 2019-11-26 PROCEDURE — 3077F PR MOST RECENT SYSTOLIC BLOOD PRESSURE >= 140 MM HG: ICD-10-PCS | Mod: HCNC,CPTII,S$GLB, | Performed by: NUCLEAR MEDICINE

## 2019-11-26 PROCEDURE — 3079F DIAST BP 80-89 MM HG: CPT | Mod: HCNC,CPTII,S$GLB, | Performed by: NUCLEAR MEDICINE

## 2019-11-26 PROCEDURE — 1125F PR PAIN SEVERITY QUANTIFIED, PAIN PRESENT: ICD-10-PCS | Mod: HCNC,S$GLB,, | Performed by: NUCLEAR MEDICINE

## 2019-11-26 PROCEDURE — 1100F PR PT FALLS ASSESS DOC 2+ FALLS/FALL W/INJURY/YR: ICD-10-PCS | Mod: HCNC,CPTII,S$GLB, | Performed by: NUCLEAR MEDICINE

## 2019-11-26 PROCEDURE — 99214 PR OFFICE/OUTPT VISIT, EST, LEVL IV, 30-39 MIN: ICD-10-PCS | Mod: HCNC,S$GLB,, | Performed by: NUCLEAR MEDICINE

## 2019-11-26 PROCEDURE — 99999 PR PBB SHADOW E&M-EST. PATIENT-LVL III: ICD-10-PCS | Mod: PBBFAC,HCNC,, | Performed by: NUCLEAR MEDICINE

## 2019-11-26 PROCEDURE — 1159F PR MEDICATION LIST DOCUMENTED IN MEDICAL RECORD: ICD-10-PCS | Mod: HCNC,S$GLB,, | Performed by: NUCLEAR MEDICINE

## 2019-11-26 PROCEDURE — 99214 OFFICE O/P EST MOD 30 MIN: CPT | Mod: HCNC,S$GLB,, | Performed by: NUCLEAR MEDICINE

## 2019-11-26 RX ORDER — ALLOPURINOL 100 MG/1
TABLET ORAL
Qty: 90 TABLET | Refills: 3 | Status: SHIPPED | OUTPATIENT
Start: 2019-11-26 | End: 2020-02-13

## 2019-11-26 NOTE — PROGRESS NOTES
Subjective:   Patient ID:  Srinath Mcknight is a 79 y.o. male who presents for follow-up of Coronary Artery Disease; Hypertension; and Hyperlipidemia      HPI NO RECENT HOSPITALIZATIONS FOR UNCONTROLLED BP. 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 NO ORTHOPNEA OR PND  NO PALPITATIONS.  NO NEAR SYNCOPE OR SYNCOPE  NO ABDOMINAL DISCOMFORT  NO EDEMA. NO CALVE TENDERNESS  CARD MED GOOD COMPLIANCE, NO SIDE EFFECTS    Review of Systems   Constitution: Negative for chills, fever, night sweats, weight gain and weight loss.   HENT: Negative for nosebleeds.    Eyes: Negative for blurred vision, double vision and visual disturbance.   Cardiovascular: Positive for dyspnea on exertion. Negative for chest pain, irregular heartbeat, leg swelling, orthopnea, palpitations, paroxysmal nocturnal dyspnea and syncope.   Respiratory: Negative for cough, hemoptysis and wheezing.    Endocrine: Negative for polydipsia and polyuria.   Hematologic/Lymphatic: Does not bruise/bleed easily.   Skin: Negative for rash.   Musculoskeletal: Positive for neck pain. Negative for joint pain, joint swelling, muscle weakness and myalgias.   Gastrointestinal: Negative for abdominal pain, hematemesis, jaundice and melena.   Genitourinary: Negative for dysuria, hematuria and nocturia.   Neurological: Positive for headaches. Negative for dizziness, focal weakness, sensory change and weakness.   Psychiatric/Behavioral: Negative for depression. The patient does not have insomnia and is not nervous/anxious.      Family History   Problem Relation Age of Onset    Hypertension Mother     Heart disease Mother     Diabetes Mother     Hypertension Father     Heart disease Father     Diabetes Father     Stroke Father     Diabetes Sister     Cancer  Brother 50        pancreas    Drug abuse Brother     Prostate cancer Neg Hx     Macular degeneration Neg Hx     Retinal detachment Neg Hx     Strabismus Neg Hx     Glaucoma Neg Hx     Blindness Neg Hx     Amblyopia Neg Hx     Kidney disease Neg Hx     Mental illness Neg Hx     Mental retardation Neg Hx     COPD Neg Hx     Asthma Neg Hx      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    Trouble in sleeping     Type 2 diabetes mellitus with diabetic polyneuropathy, without long-term current use of insulin     Urinary incontinence     Uses hearing aid     bilat     Social History     Socioeconomic History    Marital status:      Spouse name: Not on file    Number of children: 0    Years of education: Not on file    Highest education level: Not on file   Occupational History    Occupation: retired     Comment: teacher   Social Needs    Financial resource strain: Not on file    Food insecurity:     Worry: Not on file     Inability: Not on file    Transportation needs:     Medical: Not on file     Non-medical: Not on file   Tobacco Use    Smoking status: Never Smoker    Smokeless tobacco: Never Used   Substance and Sexual Activity    Alcohol use: Yes     Alcohol/week: 1.0 standard drinks     Types: 1 Glasses of wine per week     Comment: rarely  No alcohol prior to surgery    Drug use: No    Sexual activity: Yes     Partners: Female     Birth control/protection: Condom   Lifestyle    Physical  activity:     Days per week: Not on file     Minutes per session: Not on file    Stress: Not on file   Relationships    Social connections:     Talks on phone: Not on file     Gets together: Not on file     Attends Jain service: Not on file     Active member of club or organization: Not on file     Attends meetings of clubs or organizations: Not on file     Relationship status: Not on file   Other Topics Concern    Not on file   Social History Narrative    Single lives alone. No children. 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 Caffeine intake - sugar free cola, Rare coffee intake. Still drives. Does have a Living Will . Has a nephew Jt Gayle, lives in [place]. Has a friend Karina Rossi, locally who could help him.      Current Outpatient Medications on File Prior to Visit   Medication Sig Dispense Refill    ACCU-CHEK FRANKO PLUS TEST STRP Strp TEST BLOOD SUGAR EVERY  strip 0    ACCU-CHEK SOFTCLIX LANCETS Misc TEST ONE TIME DAILY 100 each 11    allopurinol (ZYLOPRIM) 100 MG tablet TAKE 1 TABLET EVERY DAY 90 tablet 3    aspirin (ECOTRIN) 81 MG EC tablet Take 1 tablet (81 mg total) by mouth once daily.  0    bisacodyl (DULCOLAX) 10 mg Supp   0    blood-glucose meter (ACCU-CHEK FRANKO PLUS METER) Misc 1 Device by Misc.(Non-Drug; Combo Route) route once daily. 1 each 0    celecoxib (CELEBREX) 200 MG capsule TAKE 1 CAPSULE(200 MG) BY MOUTH EVERY DAY 90 capsule 0    clonazePAM (KLONOPIN) 0.5 MG disintegrating tablet DISSOLVE ONE TABLET BY MOUTH EVERY NIGHT AT BEDTIME 90 tablet 0    docusate sodium (COLACE) 100 MG capsule Take 1 capsule (100 mg total) by mouth 2 (two) times daily as needed. (Patient taking differently: Take 100 mg by mouth 2 (two) times daily. Per [facility]) 60 capsule 0    ferrous sulfate 324 mg (65 mg iron) TbEC Take 1 tablet (324 mg total) by mouth once daily.      finasteride (PROSCAR) 5 mg tablet Take 1 tablet (5 mg  total) by mouth once daily. 30 tablet 6    gabapentin (NEURONTIN) 300 MG capsule Take 300 mg by mouth 3 (three) times daily.      GARLIC EXTRACT ORAL Take 1 tablet by mouth once daily. Per [facility]      glipiZIDE (GLUCOTROL) 5 MG tablet Take 2 tablets (10 mg total) by mouth 2 (two) times daily with meals. 360 tablet 4    losartan (COZAAR) 50 MG tablet Take 1 tablet (50 mg total) by mouth once daily. 90 tablet 4    MELATONIN ORAL Take by mouth every evening.      metFORMIN (GLUCOPHAGE) 1000 MG tablet TAKE 1 TABLET TWICE DAILY WITH MEALS 180 tablet 3    metoprolol succinate (TOPROL-XL) 50 MG 24 hr tablet TAKE 1 TABLET EVERY DAY 90 tablet 3    multivitamin (ONE DAILY MULTIVITAMIN) per tablet Take 1 tablet by mouth once daily.      pravastatin (PRAVACHOL) 40 MG tablet TAKE 1 TABLET EVERY DAY 90 tablet 3    tadalafil (CIALIS) 5 MG tablet Take 1 tablet (5 mg total) by mouth daily as needed for Erectile Dysfunction. 30 tablet 0    tamsulosin (FLOMAX) 0.4 mg Cap Take 1 capsule (0.4 mg total) by mouth once daily. 90 capsule 3    cyclobenzaprine (FLEXERIL) 10 MG tablet 1/2 to one tab po qhs prn muscle spasm (Patient not taking: Reported on 11/26/2019) 30 tablet 0     No current facility-administered medications on file prior to visit.      Review of patient's allergies indicates:   Allergen Reactions    Sulfa (sulfonamide antibiotics)      Can't recall from 1995       Objective:     Physical Exam   Constitutional: He is oriented to person, place, and time. He appears well-developed. No distress.   HENT:   Head: Normocephalic.   Eyes: Pupils are equal, round, and reactive to light. Conjunctivae are normal.   Neck: Neck supple. No JVD present. No thyromegaly present.   Cardiovascular: Normal rate, regular rhythm, normal heart sounds and intact distal pulses. Exam reveals no gallop and no friction rub.   No murmur heard.  Pulses:       Carotid pulses are 2+ on the right side, and 2+ on the left side.        Radial pulses are 2+ on the right side, and 2+ on the left side.        Femoral pulses are 2+ on the right side, and 2+ on the left side.       Popliteal pulses are 2+ on the right side, and 2+ on the left side.        Dorsalis pedis pulses are 2+ on the right side, and 2+ on the left side.        Posterior tibial pulses are 2+ on the right side, and 2+ on the left side.   Pulmonary/Chest: Breath sounds normal. He has no wheezes. He has no rales. He exhibits no tenderness.   Abdominal: Soft. Bowel sounds are normal. He exhibits no mass. There is no hepatosplenomegaly. There is no tenderness.   Musculoskeletal: He exhibits no edema or tenderness.        Cervical back: Normal.        Thoracic back: Normal.        Lumbar back: Normal.   Lymphadenopathy:     He has no cervical adenopathy.     He has no axillary adenopathy.        Right: No supraclavicular adenopathy present.        Left: No supraclavicular adenopathy present.   Neurological: He is alert and oriented to person, place, and time. He has normal strength. No sensory deficit. Gait normal.   Skin: Skin is warm. No cyanosis. No pallor. Nails show no clubbing.   Psychiatric: He has a normal mood and affect. His speech is normal and behavior is normal. Cognition and memory are normal.       Assessment:     1. Essential hypertension    2. Coronary artery disease, occlusive    3. Combined hyperlipidemia associated with type 2 diabetes mellitus        Plan:     Essential hypertension  WELL CONTROLLED BP  CNS STATUS STABLE  CARD MED WELL TOLERATED    Coronary artery disease, occlusive  NO ACTIVE MYOCARDIAL ISCHEMIA  NO ARRHYTHMIAS  NO ADHF    Combined hyperlipidemia associated with type 2 diabetes mellitus  STATIN WELL TOLERATED    CONTINUE PRESENT CARD MANAGEMENT    RETURN IN 6 MONTHS

## 2020-01-08 ENCOUNTER — PATIENT OUTREACH (OUTPATIENT)
Dept: ADMINISTRATIVE | Facility: HOSPITAL | Age: 80
End: 2020-01-08

## 2020-01-09 ENCOUNTER — OFFICE VISIT (OUTPATIENT)
Dept: INTERNAL MEDICINE | Facility: CLINIC | Age: 80
End: 2020-01-09
Payer: MEDICARE

## 2020-01-09 VITALS
BODY MASS INDEX: 29.22 KG/M2 | WEIGHT: 235 LBS | TEMPERATURE: 97 F | DIASTOLIC BLOOD PRESSURE: 80 MMHG | HEART RATE: 77 BPM | SYSTOLIC BLOOD PRESSURE: 126 MMHG | HEIGHT: 75 IN

## 2020-01-09 DIAGNOSIS — S16.1XXA STRAIN OF NECK MUSCLE, INITIAL ENCOUNTER: Primary | ICD-10-CM

## 2020-01-09 DIAGNOSIS — N28.9 RENAL INSUFFICIENCY: ICD-10-CM

## 2020-01-09 PROCEDURE — 99214 PR OFFICE/OUTPT VISIT, EST, LEVL IV, 30-39 MIN: ICD-10-PCS | Mod: HCNC,S$GLB,, | Performed by: INTERNAL MEDICINE

## 2020-01-09 PROCEDURE — 1159F PR MEDICATION LIST DOCUMENTED IN MEDICAL RECORD: ICD-10-PCS | Mod: HCNC,S$GLB,, | Performed by: INTERNAL MEDICINE

## 2020-01-09 PROCEDURE — 3074F PR MOST RECENT SYSTOLIC BLOOD PRESSURE < 130 MM HG: ICD-10-PCS | Mod: HCNC,CPTII,S$GLB, | Performed by: INTERNAL MEDICINE

## 2020-01-09 PROCEDURE — 3079F DIAST BP 80-89 MM HG: CPT | Mod: HCNC,CPTII,S$GLB, | Performed by: INTERNAL MEDICINE

## 2020-01-09 PROCEDURE — 1159F MED LIST DOCD IN RCRD: CPT | Mod: HCNC,S$GLB,, | Performed by: INTERNAL MEDICINE

## 2020-01-09 PROCEDURE — 1100F PTFALLS ASSESS-DOCD GE2>/YR: CPT | Mod: HCNC,CPTII,S$GLB, | Performed by: INTERNAL MEDICINE

## 2020-01-09 PROCEDURE — 3288F PR FALLS RISK ASSESSMENT DOCUMENTED: ICD-10-PCS | Mod: HCNC,CPTII,S$GLB, | Performed by: INTERNAL MEDICINE

## 2020-01-09 PROCEDURE — 3074F SYST BP LT 130 MM HG: CPT | Mod: HCNC,CPTII,S$GLB, | Performed by: INTERNAL MEDICINE

## 2020-01-09 PROCEDURE — 1125F AMNT PAIN NOTED PAIN PRSNT: CPT | Mod: HCNC,S$GLB,, | Performed by: INTERNAL MEDICINE

## 2020-01-09 PROCEDURE — 3079F PR MOST RECENT DIASTOLIC BLOOD PRESSURE 80-89 MM HG: ICD-10-PCS | Mod: HCNC,CPTII,S$GLB, | Performed by: INTERNAL MEDICINE

## 2020-01-09 PROCEDURE — 99999 PR PBB SHADOW E&M-EST. PATIENT-LVL III: ICD-10-PCS | Mod: PBBFAC,HCNC,, | Performed by: INTERNAL MEDICINE

## 2020-01-09 PROCEDURE — 3288F FALL RISK ASSESSMENT DOCD: CPT | Mod: HCNC,CPTII,S$GLB, | Performed by: INTERNAL MEDICINE

## 2020-01-09 PROCEDURE — 99999 PR PBB SHADOW E&M-EST. PATIENT-LVL III: CPT | Mod: PBBFAC,HCNC,, | Performed by: INTERNAL MEDICINE

## 2020-01-09 PROCEDURE — 1100F PR PT FALLS ASSESS DOC 2+ FALLS/FALL W/INJURY/YR: ICD-10-PCS | Mod: HCNC,CPTII,S$GLB, | Performed by: INTERNAL MEDICINE

## 2020-01-09 PROCEDURE — 99214 OFFICE O/P EST MOD 30 MIN: CPT | Mod: HCNC,S$GLB,, | Performed by: INTERNAL MEDICINE

## 2020-01-09 PROCEDURE — 1125F PR PAIN SEVERITY QUANTIFIED, PAIN PRESENT: ICD-10-PCS | Mod: HCNC,S$GLB,, | Performed by: INTERNAL MEDICINE

## 2020-01-09 RX ORDER — METHYLPREDNISOLONE 4 MG/1
TABLET ORAL
Qty: 1 PACKAGE | Refills: 0 | Status: SHIPPED | OUTPATIENT
Start: 2020-01-09 | End: 2020-08-17 | Stop reason: ALTCHOICE

## 2020-01-09 RX ORDER — CYCLOBENZAPRINE HCL 10 MG
TABLET ORAL
Qty: 30 TABLET | Refills: 0 | Status: CANCELLED | OUTPATIENT
Start: 2020-01-09

## 2020-01-09 RX ORDER — METHOCARBAMOL 500 MG/1
TABLET, FILM COATED ORAL
Qty: 40 TABLET | Refills: 0 | Status: SHIPPED | OUTPATIENT
Start: 2020-01-09 | End: 2020-11-18

## 2020-01-09 RX ORDER — NAPROXEN 500 MG/1
500 TABLET ORAL 2 TIMES DAILY WITH MEALS
Qty: 30 TABLET | Refills: 0 | Status: CANCELLED | OUTPATIENT
Start: 2020-01-09

## 2020-01-09 RX ORDER — METHYLPREDNISOLONE 4 MG/1
TABLET ORAL
Qty: 1 PACKAGE | Refills: 0 | Status: CANCELLED | OUTPATIENT
Start: 2020-01-09

## 2020-01-09 NOTE — PROGRESS NOTES
Subjective:      Patient ID: Srinath Mcknight is a 79 y.o. male.    Chief Complaint: Neck Pain (right) and Headache (right parietal)    Neck Pain    This is a recurrent problem. The current episode started 1 to 4 weeks ago. The problem has been waxing and waning. The pain is associated with nothing. The pain is present in the right side. The quality of the pain is described as aching. The pain is moderate. The symptoms are aggravated by twisting. The pain is same all the time. Associated symptoms include headaches (neck pain radiates to occiptal and parietal region). Pertinent negatives include no chest pain, fever, leg pain, numbness, pain with swallowing, paresis, photophobia, syncope, tingling, trouble swallowing, visual change or weakness. He has tried muscle relaxants for the symptoms. The treatment provided mild relief.      80 yo with   Patient Active Problem List   Diagnosis    Combined hyperlipidemia associated with type 2 diabetes mellitus    Coronary artery disease, occlusive    Essential hypertension    Osteoarthritis of spine with radiculopathy, lumbar region    Primary osteoarthritis of left hip    Aortic calcification    Anemia due to unknown mechanism    Type 2 diabetes mellitus with diabetic polyneuropathy, without long-term current use of insulin    PLMD (periodic limb movement disorder)    CHARLES (obstructive sleep apnea)    Arthritis of left knee    Chronic bilateral low back pain with bilateral sciatica    Lumbar spondylosis    Idiopathic chronic gout of right foot without tophus    Sacroiliac joint pain    Chronic anemia    Elevated serum creatinine    Leukopenia    Type 2 diabetes mellitus with diabetic peripheral angiopathy without gangrene, without long-term current use of insulin    Chronic hip pain after total replacement of left hip joint    Benign prostatic hyperplasia     Past Medical History:   Diagnosis Date    Anemia due to unknown mechanism 11/10/2015    Angina  "pectoris 2014    not since    Arthritis     Back pain     Coronary artery disease     DM (diabetes mellitus) 25-30 years     am 05/15/2019    DM (diabetes mellitus)     BS 82 am 06/24/2019    Encounter for blood transfusion     Gout 12/05/2017    right foot     Hyperlipemia     Hypertension     CHARLES (obstructive sleep apnea)     Polyneuropathy     Spinal cord disease     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   HENT: Negative for congestion, sinus pressure and trouble swallowing.    Eyes: Negative for photophobia and visual disturbance.   Respiratory: Negative for chest tightness, shortness of breath and wheezing.    Cardiovascular: Negative for chest pain and syncope.   Musculoskeletal: Positive for neck pain.   Neurological: Positive for headaches (neck pain radiates to occiptal and parietal region). Negative for dizziness, tingling, tremors, syncope, facial asymmetry, speech difficulty, weakness, light-headedness and numbness.     Objective:   /80 (BP Location: Left arm, Patient Position: Sitting, BP Method: Medium (Manual))   Pulse 77   Temp 97.4 °F (36.3 °C) (Tympanic)   Ht 6' 3" (1.905 m)   Wt 106.6 kg (235 lb 0.2 oz)   BMI 29.37 kg/m²     Physical Exam   Constitutional: He is oriented to person, place, and time. He appears well-developed and well-nourished. No distress.   HENT:   Head: Normocephalic and atraumatic.   Eyes: Conjunctivae and EOM are normal.   Neck: Neck supple. Muscular tenderness present. No spinous process tenderness present. No neck rigidity. Decreased range of motion (worse with right rotation.) present.   Good flexion and extension.    Cardiovascular: Normal rate and regular " rhythm.   Pulmonary/Chest: Effort normal and breath sounds normal.   Neurological: He is alert and oriented to person, place, and time. No cranial nerve deficit.   dtrs equal. Good strength digna.    Skin: Skin is warm and dry.   Psychiatric: He has a normal mood and affect. His behavior is normal.       Assessment:     1. Strain of neck muscle, initial encounter    2. Renal insufficiency      Plan:   Strain of neck muscle, initial encounter  -     methylPREDNISolone (MEDROL, DEANDRE,) 4 mg tablet; use as directed  Dispense: 1 Package; Refill: 0  -     methocarbamol (ROBAXIN) 500 MG Tab; One or 2 tablets q 8 hours as needed for muscle/neck tightness/stiffness.  Dispense: 40 tablet; Refill: 0    Renal insufficiency    avoid nsaids  tylenol prn  Has f/u with pcp next week.     Lab Frequency Next Occurrence   X-Ray Hip 2 View Left Once 03/25/2019   Alpha-Globin Gene Analysis Once 05/20/2019   PSA, Screening Once 11/06/2019   US Retroperitoneal Complete (Kidney and Once 11/06/2019   CBC auto differential Once 11/19/2019   Iron and TIBC Once 11/19/2019   Basic metabolic panel Once 11/19/2019       Problem List Items Addressed This Visit     None      Visit Diagnoses     Strain of neck muscle, initial encounter    -  Primary    Relevant Medications    methylPREDNISolone (MEDROL, DEANDRE,) 4 mg tablet    methocarbamol (ROBAXIN) 500 MG Tab    Renal insufficiency              Follow up if symptoms worsen or fail to improve.

## 2020-01-16 ENCOUNTER — OFFICE VISIT (OUTPATIENT)
Dept: INTERNAL MEDICINE | Facility: CLINIC | Age: 80
End: 2020-01-16
Payer: MEDICARE

## 2020-01-16 VITALS
TEMPERATURE: 98 F | DIASTOLIC BLOOD PRESSURE: 76 MMHG | SYSTOLIC BLOOD PRESSURE: 128 MMHG | BODY MASS INDEX: 28.59 KG/M2 | HEART RATE: 68 BPM | HEIGHT: 75 IN | WEIGHT: 229.94 LBS

## 2020-01-16 DIAGNOSIS — M62.838 NECK MUSCLE SPASM: Primary | ICD-10-CM

## 2020-01-16 PROCEDURE — 3078F PR MOST RECENT DIASTOLIC BLOOD PRESSURE < 80 MM HG: ICD-10-PCS | Mod: HCNC,CPTII,S$GLB, | Performed by: PHYSICIAN ASSISTANT

## 2020-01-16 PROCEDURE — 1100F PTFALLS ASSESS-DOCD GE2>/YR: CPT | Mod: HCNC,CPTII,S$GLB, | Performed by: PHYSICIAN ASSISTANT

## 2020-01-16 PROCEDURE — 3288F FALL RISK ASSESSMENT DOCD: CPT | Mod: HCNC,CPTII,S$GLB, | Performed by: PHYSICIAN ASSISTANT

## 2020-01-16 PROCEDURE — 1125F AMNT PAIN NOTED PAIN PRSNT: CPT | Mod: HCNC,S$GLB,, | Performed by: PHYSICIAN ASSISTANT

## 2020-01-16 PROCEDURE — 3288F PR FALLS RISK ASSESSMENT DOCUMENTED: ICD-10-PCS | Mod: HCNC,CPTII,S$GLB, | Performed by: PHYSICIAN ASSISTANT

## 2020-01-16 PROCEDURE — 1125F PR PAIN SEVERITY QUANTIFIED, PAIN PRESENT: ICD-10-PCS | Mod: HCNC,S$GLB,, | Performed by: PHYSICIAN ASSISTANT

## 2020-01-16 PROCEDURE — 3074F SYST BP LT 130 MM HG: CPT | Mod: HCNC,CPTII,S$GLB, | Performed by: PHYSICIAN ASSISTANT

## 2020-01-16 PROCEDURE — 1159F PR MEDICATION LIST DOCUMENTED IN MEDICAL RECORD: ICD-10-PCS | Mod: HCNC,S$GLB,, | Performed by: PHYSICIAN ASSISTANT

## 2020-01-16 PROCEDURE — 99213 OFFICE O/P EST LOW 20 MIN: CPT | Mod: HCNC,S$GLB,, | Performed by: PHYSICIAN ASSISTANT

## 2020-01-16 PROCEDURE — 99999 PR PBB SHADOW E&M-EST. PATIENT-LVL V: ICD-10-PCS | Mod: PBBFAC,HCNC,, | Performed by: PHYSICIAN ASSISTANT

## 2020-01-16 PROCEDURE — 3074F PR MOST RECENT SYSTOLIC BLOOD PRESSURE < 130 MM HG: ICD-10-PCS | Mod: HCNC,CPTII,S$GLB, | Performed by: PHYSICIAN ASSISTANT

## 2020-01-16 PROCEDURE — 1159F MED LIST DOCD IN RCRD: CPT | Mod: HCNC,S$GLB,, | Performed by: PHYSICIAN ASSISTANT

## 2020-01-16 PROCEDURE — 99999 PR PBB SHADOW E&M-EST. PATIENT-LVL V: CPT | Mod: PBBFAC,HCNC,, | Performed by: PHYSICIAN ASSISTANT

## 2020-01-16 PROCEDURE — 99213 PR OFFICE/OUTPT VISIT, EST, LEVL III, 20-29 MIN: ICD-10-PCS | Mod: HCNC,S$GLB,, | Performed by: PHYSICIAN ASSISTANT

## 2020-01-16 PROCEDURE — 1100F PR PT FALLS ASSESS DOC 2+ FALLS/FALL W/INJURY/YR: ICD-10-PCS | Mod: HCNC,CPTII,S$GLB, | Performed by: PHYSICIAN ASSISTANT

## 2020-01-16 PROCEDURE — 3078F DIAST BP <80 MM HG: CPT | Mod: HCNC,CPTII,S$GLB, | Performed by: PHYSICIAN ASSISTANT

## 2020-02-13 RX ORDER — METOPROLOL SUCCINATE 50 MG/1
TABLET, EXTENDED RELEASE ORAL
Qty: 90 TABLET | Refills: 3 | Status: SHIPPED | OUTPATIENT
Start: 2020-02-13 | End: 2020-11-19

## 2020-02-13 RX ORDER — ALLOPURINOL 100 MG/1
TABLET ORAL
Qty: 90 TABLET | Refills: 3 | Status: SHIPPED | OUTPATIENT
Start: 2020-02-13 | End: 2020-02-22

## 2020-02-22 RX ORDER — ALLOPURINOL 100 MG/1
TABLET ORAL
Qty: 90 TABLET | Refills: 3 | Status: SHIPPED | OUTPATIENT
Start: 2020-02-22 | End: 2020-11-19

## 2020-03-03 ENCOUNTER — LAB VISIT (OUTPATIENT)
Dept: LAB | Facility: HOSPITAL | Age: 80
End: 2020-03-03
Attending: NURSE PRACTITIONER
Payer: MEDICARE

## 2020-03-03 DIAGNOSIS — D64.9 CHRONIC ANEMIA: ICD-10-CM

## 2020-03-03 DIAGNOSIS — D64.9 ANEMIA DUE TO UNKNOWN MECHANISM: ICD-10-CM

## 2020-03-03 LAB
ANION GAP SERPL CALC-SCNC: 12 MMOL/L (ref 8–16)
BASOPHILS # BLD AUTO: 0.03 K/UL (ref 0–0.2)
BASOPHILS NFR BLD: 0.6 % (ref 0–1.9)
BUN SERPL-MCNC: 19 MG/DL (ref 8–23)
CALCIUM SERPL-MCNC: 8.9 MG/DL (ref 8.7–10.5)
CHLORIDE SERPL-SCNC: 110 MMOL/L (ref 95–110)
CO2 SERPL-SCNC: 24 MMOL/L (ref 23–29)
CREAT SERPL-MCNC: 1.5 MG/DL (ref 0.5–1.4)
DIFFERENTIAL METHOD: ABNORMAL
EOSINOPHIL # BLD AUTO: 0.3 K/UL (ref 0–0.5)
EOSINOPHIL NFR BLD: 5.8 % (ref 0–8)
ERYTHROCYTE [DISTWIDTH] IN BLOOD BY AUTOMATED COUNT: 16.1 % (ref 11.5–14.5)
EST. GFR  (AFRICAN AMERICAN): 51 ML/MIN/1.73 M^2
EST. GFR  (NON AFRICAN AMERICAN): 44 ML/MIN/1.73 M^2
GLUCOSE SERPL-MCNC: 129 MG/DL (ref 70–110)
HCT VFR BLD AUTO: 36.3 % (ref 40–54)
HGB BLD-MCNC: 11.9 G/DL (ref 14–18)
IMM GRANULOCYTES # BLD AUTO: 0.02 K/UL (ref 0–0.04)
IMM GRANULOCYTES NFR BLD AUTO: 0.4 % (ref 0–0.5)
IRON SERPL-MCNC: 82 UG/DL (ref 45–160)
LYMPHOCYTES # BLD AUTO: 1.8 K/UL (ref 1–4.8)
LYMPHOCYTES NFR BLD: 38.8 % (ref 18–48)
MCH RBC QN AUTO: 28.4 PG (ref 27–31)
MCHC RBC AUTO-ENTMCNC: 32.8 G/DL (ref 32–36)
MCV RBC AUTO: 87 FL (ref 82–98)
MONOCYTES # BLD AUTO: 0.6 K/UL (ref 0.3–1)
MONOCYTES NFR BLD: 13 % (ref 4–15)
NEUTROPHILS # BLD AUTO: 2 K/UL (ref 1.8–7.7)
NEUTROPHILS NFR BLD: 41.8 % (ref 38–73)
NRBC BLD-RTO: 0 /100 WBC
PLATELET # BLD AUTO: 213 K/UL (ref 150–350)
PMV BLD AUTO: 9.7 FL (ref 9.2–12.9)
POTASSIUM SERPL-SCNC: 4.6 MMOL/L (ref 3.5–5.1)
RBC # BLD AUTO: 4.19 M/UL (ref 4.6–6.2)
SATURATED IRON: 24 % (ref 20–50)
SODIUM SERPL-SCNC: 146 MMOL/L (ref 136–145)
TOTAL IRON BINDING CAPACITY: 337 UG/DL (ref 250–450)
TRANSFERRIN SERPL-MCNC: 228 MG/DL (ref 200–375)
WBC # BLD AUTO: 4.69 K/UL (ref 3.9–12.7)

## 2020-03-03 PROCEDURE — 85025 COMPLETE CBC W/AUTO DIFF WBC: CPT | Mod: HCNC

## 2020-03-03 PROCEDURE — 36415 COLL VENOUS BLD VENIPUNCTURE: CPT | Mod: HCNC

## 2020-03-03 PROCEDURE — 83540 ASSAY OF IRON: CPT | Mod: HCNC

## 2020-03-03 PROCEDURE — 80048 BASIC METABOLIC PNL TOTAL CA: CPT | Mod: HCNC

## 2020-03-06 ENCOUNTER — OFFICE VISIT (OUTPATIENT)
Dept: HEMATOLOGY/ONCOLOGY | Facility: CLINIC | Age: 80
End: 2020-03-06
Payer: MEDICARE

## 2020-03-06 VITALS
SYSTOLIC BLOOD PRESSURE: 142 MMHG | HEART RATE: 71 BPM | WEIGHT: 235.44 LBS | TEMPERATURE: 98 F | DIASTOLIC BLOOD PRESSURE: 69 MMHG | OXYGEN SATURATION: 98 % | HEIGHT: 75 IN | BODY MASS INDEX: 29.27 KG/M2 | RESPIRATION RATE: 18 BRPM

## 2020-03-06 DIAGNOSIS — D64.9 CHRONIC ANEMIA: ICD-10-CM

## 2020-03-06 DIAGNOSIS — N18.30 CKD (CHRONIC KIDNEY DISEASE) STAGE 3, GFR 30-59 ML/MIN: ICD-10-CM

## 2020-03-06 DIAGNOSIS — D64.9 ANEMIA DUE TO UNKNOWN MECHANISM: ICD-10-CM

## 2020-03-06 DIAGNOSIS — D53.9 NUTRITIONAL ANEMIA: Primary | ICD-10-CM

## 2020-03-06 PROCEDURE — 1159F PR MEDICATION LIST DOCUMENTED IN MEDICAL RECORD: ICD-10-PCS | Mod: HCNC,S$GLB,, | Performed by: NURSE PRACTITIONER

## 2020-03-06 PROCEDURE — 3078F PR MOST RECENT DIASTOLIC BLOOD PRESSURE < 80 MM HG: ICD-10-PCS | Mod: HCNC,CPTII,S$GLB, | Performed by: NURSE PRACTITIONER

## 2020-03-06 PROCEDURE — 1101F PT FALLS ASSESS-DOCD LE1/YR: CPT | Mod: HCNC,CPTII,S$GLB, | Performed by: NURSE PRACTITIONER

## 2020-03-06 PROCEDURE — 3077F SYST BP >= 140 MM HG: CPT | Mod: HCNC,CPTII,S$GLB, | Performed by: NURSE PRACTITIONER

## 2020-03-06 PROCEDURE — 3078F DIAST BP <80 MM HG: CPT | Mod: HCNC,CPTII,S$GLB, | Performed by: NURSE PRACTITIONER

## 2020-03-06 PROCEDURE — 1101F PR PT FALLS ASSESS DOC 0-1 FALLS W/OUT INJ PAST YR: ICD-10-PCS | Mod: HCNC,CPTII,S$GLB, | Performed by: NURSE PRACTITIONER

## 2020-03-06 PROCEDURE — 1159F MED LIST DOCD IN RCRD: CPT | Mod: HCNC,S$GLB,, | Performed by: NURSE PRACTITIONER

## 2020-03-06 PROCEDURE — 99999 PR PBB SHADOW E&M-EST. PATIENT-LVL V: CPT | Mod: PBBFAC,HCNC,, | Performed by: NURSE PRACTITIONER

## 2020-03-06 PROCEDURE — 99214 PR OFFICE/OUTPT VISIT, EST, LEVL IV, 30-39 MIN: ICD-10-PCS | Mod: HCNC,S$GLB,, | Performed by: NURSE PRACTITIONER

## 2020-03-06 PROCEDURE — 99999 PR PBB SHADOW E&M-EST. PATIENT-LVL V: ICD-10-PCS | Mod: PBBFAC,HCNC,, | Performed by: NURSE PRACTITIONER

## 2020-03-06 PROCEDURE — 3077F PR MOST RECENT SYSTOLIC BLOOD PRESSURE >= 140 MM HG: ICD-10-PCS | Mod: HCNC,CPTII,S$GLB, | Performed by: NURSE PRACTITIONER

## 2020-03-06 PROCEDURE — 99499 RISK ADDL DX/OHS AUDIT: ICD-10-PCS | Mod: HCNC,S$GLB,, | Performed by: NURSE PRACTITIONER

## 2020-03-06 PROCEDURE — 99499 UNLISTED E&M SERVICE: CPT | Mod: HCNC,S$GLB,, | Performed by: NURSE PRACTITIONER

## 2020-03-06 PROCEDURE — 1125F PR PAIN SEVERITY QUANTIFIED, PAIN PRESENT: ICD-10-PCS | Mod: HCNC,S$GLB,, | Performed by: NURSE PRACTITIONER

## 2020-03-06 PROCEDURE — 99214 OFFICE O/P EST MOD 30 MIN: CPT | Mod: HCNC,S$GLB,, | Performed by: NURSE PRACTITIONER

## 2020-03-06 PROCEDURE — 1125F AMNT PAIN NOTED PAIN PRSNT: CPT | Mod: HCNC,S$GLB,, | Performed by: NURSE PRACTITIONER

## 2020-03-06 RX ORDER — MAGNESIUM 200 MG
1000 TABLET ORAL DAILY
Qty: 90 TABLET | Refills: 3 | Status: SHIPPED | OUTPATIENT
Start: 2020-03-06 | End: 2020-03-06

## 2020-03-06 RX ORDER — MAGNESIUM 200 MG
1000 TABLET ORAL DAILY
Qty: 90 TABLET | Refills: 3 | Status: SHIPPED | OUTPATIENT
Start: 2020-03-06

## 2020-03-06 NOTE — ASSESSMENT & PLAN NOTE
Laboratory review overall stable. He remains anemic with hemoglobin 11.9. His baseline is 11-12 range. Anemia workup has been essentially unremarkable. Vitamin B12 on the lower end of normal.     Try sublingual vitamin b12 and monitor response. GI consult. Patient unsure if he has had Colonoscopy since 2015. May need EGD for near low B 12. Would defer decision to GI. Refer to Nephrology for CKD III    F/u 2-3 months with repeat labs

## 2020-03-06 NOTE — PROGRESS NOTES
Subjective:       Patient ID: Srinath Mcknight is a 79 y.o. male.    Chief Complaint: F/U anemia    HPI:  79 y.o. Male with HTN, OA, chronic anemia, DM, CAD, HLD follows up in the Heme-Onc clinic for h/o chronic anemia of unknown origin. The patient has had workup for chronic anemia of unknown origin. The laboratory studies are unremarkable. The patient has two negative stool occult blood testing on file. The patient continues to take daily iron pills. He tells me he was instructed to take iron pills by his PCP but has no knowledge of iron deficiency history.  Upon review of the medical record the patient has not had documented iron deficiency but iron levels are low normal. Patient does have sickle cell trait. He tells me this was already known to him.     Today's visit: Patient presents today for follow up of his chronic anemia. Reports having ongoing medical issues following his back surgery that has prolonged his follow up appointment time. He continues to take oral iron replacement      Social History     Socioeconomic History    Marital status:      Spouse name: Not on file    Number of children: 0    Years of education: Not on file    Highest education level: Not on file   Occupational History    Occupation: retired     Comment: teacher   Social Needs    Financial resource strain: Not on file    Food insecurity:     Worry: Not on file     Inability: Not on file    Transportation needs:     Medical: Not on file     Non-medical: Not on file   Tobacco Use    Smoking status: Never Smoker    Smokeless tobacco: Never Used   Substance and Sexual Activity    Alcohol use: Yes     Alcohol/week: 1.0 standard drinks     Types: 1 Glasses of wine per week     Comment: rarely  No alcohol prior to surgery    Drug use: No    Sexual activity: Yes     Partners: Female     Birth control/protection: Condom   Lifestyle    Physical activity:     Days per week: Not on file     Minutes per session: Not on file     Stress: Not at all   Relationships    Social connections:     Talks on phone: Not on file     Gets together: Not on file     Attends Muslim service: Not on file     Active member of club or organization: Not on file     Attends meetings of clubs or organizations: Not on file     Relationship status: Not on file   Other Topics Concern    Not on file   Social History Narrative    Single lives alone. No children. Retired but some part time work - 4 hours a day. 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 Caffeine intake - sugar free cola, Rare coffee intake. Still drives. Does have a Living Will . Has a nephew Jt Gayle, lives in [place]. Has a friend Karina Rossi, locally who could help him.        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    Trouble in sleeping     Type 2 diabetes mellitus with diabetic polyneuropathy, without long-term current use of insulin     Urinary incontinence     Uses hearing aid     bilat       Family History   Problem Relation Age of Onset    Hypertension Mother     Heart disease Mother     Diabetes Mother     Hypertension Father     Heart disease Father     Diabetes Father     Stroke Father     Diabetes Sister     Cancer Brother 50        pancreas    Drug abuse Brother     Prostate cancer Neg Hx     Macular degeneration Neg Hx     Retinal detachment Neg Hx     Strabismus Neg Hx     Glaucoma Neg Hx     Blindness Neg Hx     Amblyopia Neg Hx     Kidney disease Neg Hx      Mental illness Neg Hx     Mental retardation Neg Hx     COPD Neg Hx     Asthma Neg Hx        Past Surgical History:   Procedure Laterality Date    BACK SURGERY      HERNIA REPAIR Bilateral 04/18/2017    JOINT REPLACEMENT Left 10/09/2017    L YAHIR Dr. Alcocer    LAMINECTOMY  07/22/2016    left elbow  10/2017    procedure to remove gout    lumbar foraminotomy  03/2018    REVISION TOTAL HIP ARTHROPLASTY Left 9/11/2018    Procedure: REVISION, TOTAL ARTHROPLASTY, HIP;  Surgeon: Albaro Alcocer Sr., MD;  Location: [facility];  Service: Orthopedics;  Laterality: Left;    ROTATOR CUFF REPAIR Left 2006    SHOULDER ARTHROSCOPY W/ ROTATOR CUFF REPAIR Right 2009       Review of Systems   Constitutional: Negative for activity change, appetite change, chills, diaphoresis, fatigue, fever and unexpected weight change.   HENT: Negative for nosebleeds, sore throat, trouble swallowing and voice change.    Eyes: Negative for visual disturbance.   Respiratory: Negative for cough, chest tightness, shortness of breath and wheezing.    Cardiovascular: Negative for chest pain, palpitations and leg swelling.   Gastrointestinal: Negative for abdominal distention, abdominal pain, anal bleeding, blood in stool, constipation, diarrhea, nausea and vomiting.   Genitourinary: Negative for difficulty urinating, dysuria and hematuria.   Musculoskeletal: Positive for arthralgias and gait problem (utilizes cane). Negative for back pain and myalgias.        Patient walks with a cane   Skin: Negative for rash and wound.   Neurological: Negative for dizziness, weakness, light-headedness and headaches.   Hematological: Does not bruise/bleed easily.   Psychiatric/Behavioral: The patient is nervous/anxious.          Medication List with Changes/Refills   New Medications    CYANOCOBALAMIN, VITAMIN B-12, 1,000 MCG SUBL    Place 1,000 mcg under the tongue once daily.   Current Medications    ACCU-CHEK FRANKO PLUS TEST STRP STRP    TEST BLOOD SUGAR EVERY DAY     ACCU-CHEK SOFTCLIX LANCETS MISC    TEST ONE TIME DAILY    ALLOPURINOL (ZYLOPRIM) 100 MG TABLET    TAKE 1 TABLET EVERY DAY    ASPIRIN (ECOTRIN) 81 MG EC TABLET    Take 1 tablet (81 mg total) by mouth once daily.    BISACODYL (DULCOLAX) 10 MG SUPP        BLOOD-GLUCOSE METER (ACCU-CHEK FRANKO PLUS METER) MISC    1 Device by Misc.(Non-Drug; Combo Route) route once daily.    CLONAZEPAM (KLONOPIN) 0.5 MG DISINTEGRATING TABLET    DISSOLVE ONE TABLET BY MOUTH EVERY NIGHT AT BEDTIME    DOCUSATE SODIUM (COLACE) 100 MG CAPSULE    Take 1 capsule (100 mg total) by mouth 2 (two) times daily as needed.    FERROUS SULFATE 324 MG (65 MG IRON) TBEC    Take 1 tablet (324 mg total) by mouth once daily.    FINASTERIDE (PROSCAR) 5 MG TABLET    Take 1 tablet (5 mg total) by mouth once daily.    GABAPENTIN (NEURONTIN) 300 MG CAPSULE    Take 300 mg by mouth 3 (three) times daily.    GARLIC EXTRACT ORAL    Take 1 tablet by mouth once daily. Per [facility]    GLIPIZIDE (GLUCOTROL) 5 MG TABLET    Take 2 tablets (10 mg total) by mouth 2 (two) times daily with meals.    LOSARTAN (COZAAR) 50 MG TABLET    Take 1 tablet (50 mg total) by mouth once daily.    MELATONIN ORAL    Take by mouth every evening.    METFORMIN (GLUCOPHAGE) 1000 MG TABLET    TAKE 1 TABLET TWICE DAILY WITH MEALS    METHOCARBAMOL (ROBAXIN) 500 MG TAB    One or 2 tablets q 8 hours as needed for muscle/neck tightness/stiffness.    METHYLPREDNISOLONE (MEDROL, DEANDRE,) 4 MG TABLET    use as directed    METOPROLOL SUCCINATE (TOPROL-XL) 50 MG 24 HR TABLET    TAKE 1 TABLET EVERY DAY    MULTIVITAMIN (ONE DAILY MULTIVITAMIN) PER TABLET    Take 1 tablet by mouth once daily.    PRAVASTATIN (PRAVACHOL) 40 MG TABLET    TAKE 1 TABLET EVERY DAY    TADALAFIL (CIALIS) 5 MG TABLET    Take 1 tablet (5 mg total) by mouth daily as needed for Erectile Dysfunction.    TAMSULOSIN (FLOMAX) 0.4 MG CAP    Take 1 capsule (0.4 mg total) by mouth once daily.     Objective:     Vitals:    03/06/20 1014    BP: (!) 142/69   Pulse: 71   Resp: 18   Temp: 97.8 °F (36.6 °C)     Lab Results   Component Value Date    WBC 4.69 03/03/2020    HGB 11.9 (L) 03/03/2020    HCT 36.3 (L) 03/03/2020    MCV 87 03/03/2020     03/03/2020     Lab Results   Component Value Date    IRON 82 03/03/2020    TIBC 337 03/03/2020    FERRITIN 18 (L) 11/19/2019     BMP  Lab Results   Component Value Date     (H) 03/03/2020    K 4.6 03/03/2020     03/03/2020    CO2 24 03/03/2020    BUN 19 03/03/2020    CREATININE 1.5 (H) 03/03/2020    CALCIUM 8.9 03/03/2020    ANIONGAP 12 03/03/2020    ESTGFRAFRICA 51 (A) 03/03/2020    EGFRNONAA 44 (A) 03/03/2020     Lab Results   Component Value Date    ALT 21 09/12/2019    AST 19 09/12/2019    ALKPHOS 96 09/12/2019    BILITOT 0.4 09/12/2019               Physical Exam   Constitutional: He is oriented to person, place, and time. He appears well-developed and well-nourished. He is cooperative. No distress.   HENT:   Head: Normocephalic.   Right Ear: Hearing normal.   Left Ear: Hearing normal.   Nose: Nose normal.   Mouth/Throat: Oropharynx is clear and moist.   Eyes: Conjunctivae, EOM and lids are normal. Right eye exhibits no discharge. Left eye exhibits no discharge.   Neck: Normal range of motion. No thyroid mass present.   Cardiovascular: Normal rate, regular rhythm and normal heart sounds. Exam reveals no gallop and no friction rub.   No murmur heard.  Pulmonary/Chest: Effort normal and breath sounds normal. No respiratory distress. He has no wheezes. He has no rales. He exhibits no tenderness.   Abdominal: Soft. He exhibits no distension and no mass. There is no tenderness. There is no guarding.   Genitourinary:   Genitourinary Comments: deferred   Musculoskeletal: Normal range of motion. He exhibits no edema.   Patient walks with a cane.    Neurological: He is alert and oriented to person, place, and time.   Skin: Skin is warm, dry and intact.   Psychiatric: His speech is normal and  behavior is normal. Thought content normal.   Vitals reviewed.       Assessment:     Problem List Items Addressed This Visit        Oncology    Anemia due to unknown mechanism     Laboratory review overall stable. He remains anemic with hemoglobin 11.9. His baseline is 11-12 range. Anemia workup has been essentially unremarkable. Vitamin B12 on the lower end of normal.     Try sublingual vitamin b12 and monitor response. GI consult. Patient unsure if he has had Colonoscopy since 2015. May need EGD for near low B 12. Would defer decision to GI. Refer to Nephrology for CKD III    F/u 2-3 months with repeat labs           Chronic anemia    Relevant Orders    Ambulatory referral/consult to Nephrology    CBC auto differential    Comprehensive metabolic panel    Vitamin B12    Iron and TIBC      Other Visit Diagnoses     Nutritional anemia     -  Primary    Relevant Medications    cyanocobalamin, vitamin B-12, 1,000 mcg Subl    Other Relevant Orders    Ambulatory referral/consult to Nephrology    CBC auto differential    Comprehensive metabolic panel    Vitamin B12    Iron and TIBC    Ambulatory referral/consult to Gastroenterology    CKD (chronic kidney disease) stage 3, GFR 30-59 ml/min        Relevant Orders    Ambulatory referral/consult to Nephrology    CBC auto differential    Comprehensive metabolic panel    Vitamin B12    Iron and TIBC            Plan:     Nutritional anemia   -     Ambulatory referral/consult to Nephrology; Future; Expected date: 03/13/2020  -     CBC auto differential; Future; Expected date: 03/06/2020  -     Comprehensive metabolic panel; Future; Expected date: 03/06/2020  -     Vitamin B12; Future; Expected date: 03/06/2020  -     Iron and TIBC; Future; Expected date: 03/06/2020  -     Discontinue: cyanocobalamin, vitamin B-12, 1,000 mcg Subl; Place 1,000 mcg under the tongue once daily.  Dispense: 90 tablet; Refill: 3  -     Ambulatory referral/consult to Gastroenterology; Future; Expected  date: 03/13/2020  -     cyanocobalamin, vitamin B-12, 1,000 mcg Subl; Place 1,000 mcg under the tongue once daily.  Dispense: 90 tablet; Refill: 3    Chronic anemia  -     Ambulatory referral/consult to Nephrology; Future; Expected date: 03/13/2020  -     CBC auto differential; Future; Expected date: 03/06/2020  -     Comprehensive metabolic panel; Future; Expected date: 03/06/2020  -     Vitamin B12; Future; Expected date: 03/06/2020  -     Iron and TIBC; Future; Expected date: 03/06/2020    CKD (chronic kidney disease) stage 3, GFR 30-59 ml/min  -     Ambulatory referral/consult to Nephrology; Future; Expected date: 03/13/2020  -     CBC auto differential; Future; Expected date: 03/06/2020  -     Comprehensive metabolic panel; Future; Expected date: 03/06/2020  -     Vitamin B12; Future; Expected date: 03/06/2020  -     Iron and TIBC; Future; Expected date: 03/06/2020    Anemia due to unknown mechanism              DANIEL YuenC

## 2020-03-11 ENCOUNTER — PATIENT OUTREACH (OUTPATIENT)
Dept: ADMINISTRATIVE | Facility: OTHER | Age: 80
End: 2020-03-11

## 2020-03-11 DIAGNOSIS — E11.42 TYPE 2 DIABETES MELLITUS WITH DIABETIC POLYNEUROPATHY, WITHOUT LONG-TERM CURRENT USE OF INSULIN: Primary | Chronic | ICD-10-CM

## 2020-03-11 DIAGNOSIS — D64.9 CHRONIC ANEMIA: Primary | ICD-10-CM

## 2020-03-12 ENCOUNTER — OFFICE VISIT (OUTPATIENT)
Dept: GASTROENTEROLOGY | Facility: CLINIC | Age: 80
End: 2020-03-12
Payer: MEDICARE

## 2020-03-12 VITALS
HEIGHT: 75 IN | SYSTOLIC BLOOD PRESSURE: 130 MMHG | BODY MASS INDEX: 29.06 KG/M2 | DIASTOLIC BLOOD PRESSURE: 78 MMHG | HEART RATE: 78 BPM | WEIGHT: 233.69 LBS

## 2020-03-12 DIAGNOSIS — D53.9 NUTRITIONAL ANEMIA: ICD-10-CM

## 2020-03-12 PROCEDURE — 99204 PR OFFICE/OUTPT VISIT, NEW, LEVL IV, 45-59 MIN: ICD-10-PCS | Mod: HCNC,S$GLB,, | Performed by: INTERNAL MEDICINE

## 2020-03-12 PROCEDURE — 1100F PTFALLS ASSESS-DOCD GE2>/YR: CPT | Mod: HCNC,CPTII,S$GLB, | Performed by: INTERNAL MEDICINE

## 2020-03-12 PROCEDURE — 1125F PR PAIN SEVERITY QUANTIFIED, PAIN PRESENT: ICD-10-PCS | Mod: HCNC,S$GLB,, | Performed by: INTERNAL MEDICINE

## 2020-03-12 PROCEDURE — 1100F PR PT FALLS ASSESS DOC 2+ FALLS/FALL W/INJURY/YR: ICD-10-PCS | Mod: HCNC,CPTII,S$GLB, | Performed by: INTERNAL MEDICINE

## 2020-03-12 PROCEDURE — 3288F PR FALLS RISK ASSESSMENT DOCUMENTED: ICD-10-PCS | Mod: HCNC,CPTII,S$GLB, | Performed by: INTERNAL MEDICINE

## 2020-03-12 PROCEDURE — 3075F PR MOST RECENT SYSTOLIC BLOOD PRESS GE 130-139MM HG: ICD-10-PCS | Mod: HCNC,CPTII,S$GLB, | Performed by: INTERNAL MEDICINE

## 2020-03-12 PROCEDURE — 99999 PR PBB SHADOW E&M-EST. PATIENT-LVL III: ICD-10-PCS | Mod: PBBFAC,HCNC,, | Performed by: INTERNAL MEDICINE

## 2020-03-12 PROCEDURE — 99999 PR PBB SHADOW E&M-EST. PATIENT-LVL III: CPT | Mod: PBBFAC,HCNC,, | Performed by: INTERNAL MEDICINE

## 2020-03-12 PROCEDURE — 3288F FALL RISK ASSESSMENT DOCD: CPT | Mod: HCNC,CPTII,S$GLB, | Performed by: INTERNAL MEDICINE

## 2020-03-12 PROCEDURE — 1125F AMNT PAIN NOTED PAIN PRSNT: CPT | Mod: HCNC,S$GLB,, | Performed by: INTERNAL MEDICINE

## 2020-03-12 PROCEDURE — 3078F DIAST BP <80 MM HG: CPT | Mod: HCNC,CPTII,S$GLB, | Performed by: INTERNAL MEDICINE

## 2020-03-12 PROCEDURE — 1159F PR MEDICATION LIST DOCUMENTED IN MEDICAL RECORD: ICD-10-PCS | Mod: HCNC,S$GLB,, | Performed by: INTERNAL MEDICINE

## 2020-03-12 PROCEDURE — 3075F SYST BP GE 130 - 139MM HG: CPT | Mod: HCNC,CPTII,S$GLB, | Performed by: INTERNAL MEDICINE

## 2020-03-12 PROCEDURE — 99204 OFFICE O/P NEW MOD 45 MIN: CPT | Mod: HCNC,S$GLB,, | Performed by: INTERNAL MEDICINE

## 2020-03-12 PROCEDURE — 3078F PR MOST RECENT DIASTOLIC BLOOD PRESSURE < 80 MM HG: ICD-10-PCS | Mod: HCNC,CPTII,S$GLB, | Performed by: INTERNAL MEDICINE

## 2020-03-12 PROCEDURE — 1159F MED LIST DOCD IN RCRD: CPT | Mod: HCNC,S$GLB,, | Performed by: INTERNAL MEDICINE

## 2020-03-12 RX ORDER — SODIUM, POTASSIUM,MAG SULFATES 17.5-3.13G
1 SOLUTION, RECONSTITUTED, ORAL ORAL ONCE
Qty: 1 BOTTLE | Refills: 0 | Status: SHIPPED | OUTPATIENT
Start: 2020-03-12 | End: 2020-03-12

## 2020-03-12 NOTE — PATIENT INSTRUCTIONS
We will request the reports from [facility] for the colonoscopy you had in 2019.  If we have the report and it is a full examination you do not need another colonoscopy, if not then we will proceed with a colonoscopy and and upper endoscopy. If they are normal the next step is a video capsule endoscopy to examine your small intestines.

## 2020-03-12 NOTE — LETTER
March 22, 2020      Megha George NP  [id number] [place] [id number]           [facility]  [address]  [place] [address]  Phone: [phone]  Fax: [phone]          Patient: Srinath Mcknight   MR Number: [id number]   YOB: 1940   Date of Visit: 3/12/2020       Dear Megha George:    Thank you for referring Srinath Mcknight to me for evaluation. Attached you will find relevant portions of my assessment and plan of care.    If you have questions, please do not hesitate to call me. I look forward to following Srinath Mcknight along with you.    Sincerely,    Joseph Iraheta MD    Enclosure  CC:  No Recipients    If you would like to receive this communication electronically, please contact [email or web address] or [phone] to request more information on [employer] access.    For providers and/or their staff who would like to refer a patient to [facility], please contact us through our one-stop-shop provider referral line, [facility], at [phone].    If you feel you have received this communication in error or would no longer like to receive these types of communications, please e-mail [email or web address]

## 2020-03-22 NOTE — PROGRESS NOTES
Clinic Consult:  [facility] Consultation Note    Reason for Consult:  The encounter diagnosis was Nutritional anemia .    PCP: Yeison Wilder   [address] [place] [address]  / P[place]FIRIO LOPEZ [id number]    HPI:  This is a 79 y.o. male here for evaluation of anemia.  Mr. Mcknight has a past medical history of hypertension, osteoarthritis, diabetes, coronary artery disease, hyperlipidemia, sickle cell trait and chronic anemia.  Previously seen by Hematology and lab work showed borderline low iron as well as a low B12.  Was referred for possible endoscopy and possible colonoscopy.    Currently the patient has no overt symptoms of GI bleeding and has denied any abdominal pain, nausea, vomiting, hematemesis, diarrhea, abdominal bloating, change in appetite, jaundice.  He reports having a prior colonoscopy but not sure of the date he said possible procedure was performed in 2019 again though he is not sure.  Review of his records reveal that he did have a colonic ileus in the August of 2019.  He was admitted to [facility] and review of the records show no colonoscopy performed at that time but does document that a colonoscopy was performed in 2017 as well as having a postop ileus in 2017 as well the require surgical decompression.    His last blood count showed a hemoglobin of 11.9 and hematocrit of 36.3 with an MCV of 87, white count and platelets are normal.  This is within the range of his prior blood count as his hemoglobin and hematocrit were 11.6 and 36.3 back in May of 2019.  His last iron tests show an iron level of 82, TIBC of 337, iron sat of 24%, transferrin level of 228.  This is similar to his prior studies back from 2019 in November as well as in January of 2019.  He had prior B12 levels in November 2018 that showed a B12 of 217 and folate of 186.  He had prior FOBT that was negative.    GI history:  Endoscopy: Pt cannot recall when he had it.  Colonoscopy:Possible in 2017 or 2019, will need to get  records  Family history: Denied any family history.    ROS:  CONSTITUTIONAL: Denies weight change,  fatigue, fevers, chills, night sweats.  EYES: No changes in vision.   ENT: No oral lesions or sore throat.  HEMATOLOGICAL/Lymph: Denies bleeding tendency, bruising tendency. No swellings or enlarged lymph nodes.  CARDIOVASCULAR: Denies chest pain, shortness of breath, orthopnea and edema.  RESPIRATORY: Denies cough, hemoptysis, dyspnea, and wheezing.  GI: See HPI.  : Denies dysuria and hematuria  MUSCULOSKELETAL: Denies joint pain or swelling, back pain and muscle pain.  SKIN: Denies rashes.  NEUROLOGIC: Denies headaches, seizures and numbness.  PSYCHIATRIC: Denies depression or anxiety.  ENDOCRINE: Denies heat or cold intolerance and excessive thirst or urination.    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 Alcocer    LAMINECTOMY  07/22/2016    left elbow  10/2017    procedure to remove gout    lumbar foraminotomy  03/2018    REVISION TOTAL HIP ARTHROPLASTY Left 9/11/2018    Procedure: REVISION, TOTAL ARTHROPLASTY, HIP;  Surgeon: Albaro  Carlyn March MD;  Location: [facility] OR;  Service: Orthopedics;  Laterality: Left;    ROTATOR CUFF REPAIR Left 2006    SHOULDER ARTHROSCOPY W/ ROTATOR CUFF REPAIR Right 2009       Family History:   Family History   Problem Relation Age of Onset    Hypertension Mother     Heart disease Mother     Diabetes Mother     Hypertension Father     Heart disease Father     Diabetes Father     Stroke Father     Diabetes Sister     Cancer Brother 50        pancreas    Drug abuse Brother     Prostate cancer Neg Hx     Macular degeneration Neg Hx     Retinal detachment Neg Hx     Strabismus Neg Hx     Glaucoma Neg Hx     Blindness Neg Hx     Amblyopia Neg Hx     Kidney disease Neg Hx     Mental illness Neg Hx     Mental retardation Neg Hx     COPD Neg Hx     Asthma Neg Hx        Social History:   Social History     Tobacco Use    Smoking status: Never Smoker    Smokeless tobacco: Never Used   Substance Use Topics    Alcohol use: Yes     Alcohol/week: 1.0 standard drinks     Types: 1 Glasses of wine per week     Comment: rarely  No alcohol prior to surgery    Drug use: No       Allergies: Reviewed    Home Medications:   Medication List with Changes/Refills   Current Medications    ACCU-CHEK FRANKO PLUS TEST STRP STRP    TEST BLOOD SUGAR EVERY DAY    ACCU-CHEK SOFTCLIX LANCETS MISC    TEST ONE TIME DAILY    ALLOPURINOL (ZYLOPRIM) 100 MG TABLET    TAKE 1 TABLET EVERY DAY    ASPIRIN (ECOTRIN) 81 MG EC TABLET    Take 1 tablet (81 mg total) by mouth once daily.    BISACODYL (DULCOLAX) 10 MG SUPP        BLOOD-GLUCOSE METER (ACCU-CHEK FRANKO PLUS METER) MISC    1 Device by Misc.(Non-Drug; Combo Route) route once daily.    CLONAZEPAM (KLONOPIN) 0.5 MG DISINTEGRATING TABLET    DISSOLVE ONE TABLET BY MOUTH EVERY NIGHT AT BEDTIME    CYANOCOBALAMIN, VITAMIN B-12, 1,000 MCG SUBL    Place 1,000 mcg under the tongue once daily.    DOCUSATE SODIUM (COLACE) 100 MG CAPSULE    Take 1 capsule (100 mg total) by mouth 2 (two) times  "daily as needed.    FERROUS SULFATE 324 MG (65 MG IRON) TBEC    Take 1 tablet (324 mg total) by mouth once daily.    FINASTERIDE (PROSCAR) 5 MG TABLET    Take 1 tablet (5 mg total) by mouth once daily.    GABAPENTIN (NEURONTIN) 300 MG CAPSULE    Take 300 mg by mouth 3 (three) times daily.    GARLIC EXTRACT ORAL    Take 1 tablet by mouth once daily. Per [facility]    GLIPIZIDE (GLUCOTROL) 5 MG TABLET    Take 2 tablets (10 mg total) by mouth 2 (two) times daily with meals.    LOSARTAN (COZAAR) 50 MG TABLET    Take 1 tablet (50 mg total) by mouth once daily.    MELATONIN ORAL    Take by mouth every evening.    METFORMIN (GLUCOPHAGE) 1000 MG TABLET    TAKE 1 TABLET TWICE DAILY WITH MEALS    METHOCARBAMOL (ROBAXIN) 500 MG TAB    One or 2 tablets q 8 hours as needed for muscle/neck tightness/stiffness.    METHYLPREDNISOLONE (MEDROL, DEANDRE,) 4 MG TABLET    use as directed    METOPROLOL SUCCINATE (TOPROL-XL) 50 MG 24 HR TABLET    TAKE 1 TABLET EVERY DAY    MULTIVITAMIN (ONE DAILY MULTIVITAMIN) PER TABLET    Take 1 tablet by mouth once daily.    PRAVASTATIN (PRAVACHOL) 40 MG TABLET    TAKE 1 TABLET EVERY DAY    TADALAFIL (CIALIS) 5 MG TABLET    Take 1 tablet (5 mg total) by mouth daily as needed for Erectile Dysfunction.    TAMSULOSIN (FLOMAX) 0.4 MG CAP    Take 1 capsule (0.4 mg total) by mouth once daily.         Physical Exam:  Vital Signs:  /78   Pulse 78   Ht 6' 3" (1.905 m)   Wt 106 kg (233 lb 11 oz)   BMI 29.21 kg/m²   Body mass index is 29.21 kg/m².      GENERAL: No acute distress, A&Ox3  EYES: Anicteric, no pallor noted.  ENT: OP clear  NECK: Supple, no masses, no thyromegally.  CHEST: Equal breath sounds bilaterally, no wheezing.  CARDIOVASCULAR: Regular rate and rhythm. Murmurs, rubs and gallops absent.  ABDOMEN: soft, non-tender, non-distended, normal bowel sounds, no hepatosplenomegaly   EXTREMITIES: No clubbing, cyanosis or edema.  SKIN: Without lesion or erythema.  LYMPH: No cervical, axillary " lymphadenopathy palpable.   NEUROLOGICAL: Grossly normal, no asterixis present.    Labs: Pertinent labs reviewed.    Assessment and Plan:  Nutritional anemia   -     Ambulatory referral/consult to Gastroenterology  -     Case request GI: EGD (ESOPHAGOGASTRODUODENOSCOPY), COLONOSCOPY  -     sodium,potassium,mag sulfates (SUPREP BOWEL PREP KIT) 17.5-3.13-1.6 gram SolR; Take 177 mLs by mouth once. Use as directed for 1 dose  Dispense: 1 Bottle; Refill: 0      Mr. Mcknight is a 79-year-old male with a past history of hypertension, osteoarthritis, anemia, diabetes, coronary artery disease, hyperlipidemia, sickle cell trait.  Referred for endoscopic workup of his anemia that has been stable with a hemoglobin of 11.9 and hematocrit of 36.3 last checked on March 3, 2020.  Has borderline iron deficiency and borderline low B12 levels.  He has negative FOBT and has no overt bleeding.  Family history not significant for GI malignancies.    He has had a prior colonoscopy though he is not sure of when we will obtain the records from [facility] as that is where he mentioned having them this see when his last colonoscopy was performed and if there is any polyps or any lesions mentioned a diffuse due for a surveillance colonoscopy as well.  If no report is obtained or unable to will consider proceeding with colonoscopy along with upper endoscopy to evaluate for any gastritis and concern for pernicious anemia given the low B12.  She have upper endoscopy and colonoscopy were normal can consider video capsule endoscopy.    Follow up if symptoms worsen or fail to improve.        Thank you so much for allowing me to participate in the care of Srinath Mcknight    Joseph Iraheta MD  Gastroenterology

## 2020-04-02 ENCOUNTER — TELEPHONE (OUTPATIENT)
Dept: ENDOSCOPY | Facility: HOSPITAL | Age: 80
End: 2020-04-02

## 2020-04-02 NOTE — TELEPHONE ENCOUNTER
Attempted to reschedule procedure due to Covid -19 no answer. Left message to return call number provided. shirley

## 2020-04-03 ENCOUNTER — TELEPHONE (OUTPATIENT)
Dept: ENDOSCOPY | Facility: HOSPITAL | Age: 80
End: 2020-04-03

## 2020-04-06 ENCOUNTER — TELEPHONE (OUTPATIENT)
Dept: ENDOSCOPY | Facility: HOSPITAL | Age: 80
End: 2020-04-06

## 2020-05-02 DIAGNOSIS — N40.1 BENIGN PROSTATIC HYPERPLASIA WITH URINARY FREQUENCY: ICD-10-CM

## 2020-05-02 DIAGNOSIS — R35.0 BENIGN PROSTATIC HYPERPLASIA WITH URINARY FREQUENCY: ICD-10-CM

## 2020-05-04 RX ORDER — TAMSULOSIN HYDROCHLORIDE 0.4 MG/1
0.4 CAPSULE ORAL DAILY
Qty: 90 CAPSULE | Refills: 3 | Status: SHIPPED | OUTPATIENT
Start: 2020-05-04 | End: 2020-11-18

## 2020-05-18 ENCOUNTER — TELEPHONE (OUTPATIENT)
Dept: ENDOSCOPY | Facility: HOSPITAL | Age: 80
End: 2020-05-18

## 2020-05-19 DIAGNOSIS — I25.10 CORONARY ARTERY DISEASE, OCCLUSIVE: Primary | ICD-10-CM

## 2020-05-23 ENCOUNTER — TELEPHONE (OUTPATIENT)
Dept: GASTROENTEROLOGY | Facility: CLINIC | Age: 80
End: 2020-05-23

## 2020-05-23 NOTE — TELEPHONE ENCOUNTER
Spoke with patient and he states that he would like to postpone scheduling EGD at this time. Patient states that he will call to schedule at a later date.    Patient voiced no concerns at this time.

## 2020-05-25 ENCOUNTER — TELEPHONE (OUTPATIENT)
Dept: ENDOSCOPY | Facility: HOSPITAL | Age: 80
End: 2020-05-25

## 2020-05-25 DIAGNOSIS — D64.9 CHRONIC ANEMIA: Primary | ICD-10-CM

## 2020-05-25 NOTE — TELEPHONE ENCOUNTER
COVID Screening     1. Have you had a fever in the last 7 days or have you used fever reducing medicines for a fever in the last 7 days?  no    2. Are you experiencing shortness of breath, cough, muscle aches, loss of taste or loss of smell?  no    3. Are you residing with anyone who has tested positive for Covid?  no    If answered yes to any of the above questions, the pt must be scheduled for an appointment with their PCP.    A message also needs to be sent to the endoscopist to ensure the patient gets rescheduled at a later date.     ENDO screening    1. Have you been admitted overnight to the hospital in the past 3 months? no   If yes, schedule an appointment with PCP before scheduling endoscopic procedure.     2. Have you had a stent placed in the last 12 months? no   If yes, for a screening visit, cancel and message the ordering provider.  The patient will need a new order when the time is appropriate.     3. Have you had a stroke or heart attack in the past 6 months? no   If yes, cancel and refer patient to ordering provider for clearance, also message ordering provider to inform.     4. Have you had any chest pain in the past 3 months? no   If yes, Have you been evaluated by your PCP and/or cardiologist and it was determined to not be heart related? not applicable   If No, Pt needs to be seen by PCP or Cardiologist .  Pt can be scheduled once clearance obtained by either of those providers.     5. Do you take prescription weight loss medications?  no   If yes, must stop for 2 weeks prior to procedure.     6. Have you been diagnosed with diverticulitis within the past 3 months? no   If yes, must have been seen by GI within the last 3 months, if not schedule with GI [facility].    If pt has been seen by GI, schedule procedure 8-12 weeks post antibiotic treatment.     7. Are you on Dialysis? no  If yes, schedule procedure for the day AFTER dialysis.  Appt time should be 9am or later, patient arrival time is 2 hours  "prior.  Nulytely or    miralax prep for all patients with kidney disease.     8. Are you diabetic?  yes   If yes, schedule morning appt. Advise pt to hold all diabetic meds day of procedure.     9. If pt is older than 80 years of age and HAS NOT been seen by GI or PCP within the last 6 months, needs appt with GI [facility].   If pt has been seen by the GI provider or PCP within the past 6  months AND meets criteria, schedule procedure AND send message to the endoscopist.     10. Is patient on a "high risk" medication (blood thinner/antiplatelet agent)?  no   If yes, has cardiac clearance been obtained within the last 60 days? N/A   If no, a new clearance needs to be obtained.       I have reviewed the last colonoscopy for recommendations regarding next procedure bowel prep.  yes  I have reviewed medications and allergies.  yes  I have verified the pharmacy information and appropriate prep sent if needed. yes  Prep instructions have been mailed or sent to portal per patient request. yes    If answers yes to any of the following, schedule at [facility] ONLY. If No, OK for either location.     Is BMI over 45?   Any complaints of chest pain, new onset or at rest?  Does pt have an AICD?  Is there a diagnosis of heart failure?  Does patient have an insulin pump?  If procedure for esophageal banding?      "

## 2020-05-26 ENCOUNTER — PATIENT OUTREACH (OUTPATIENT)
Dept: ADMINISTRATIVE | Facility: OTHER | Age: 80
End: 2020-05-26

## 2020-05-26 NOTE — PROGRESS NOTES
Care Everywhere: updated  Immunization: updated  Health Maintenance: updated  Media Review: n/a  Legacy Review: n/a  Order placed: n/a  Upcoming appts:opthalmology 5.29.2020

## 2020-05-27 ENCOUNTER — HOSPITAL ENCOUNTER (OUTPATIENT)
Dept: CARDIOLOGY | Facility: HOSPITAL | Age: 80
Discharge: HOME | End: 2020-05-27
Attending: NUCLEAR MEDICINE
Payer: MEDICARE

## 2020-05-27 ENCOUNTER — OFFICE VISIT (OUTPATIENT)
Dept: CARDIOLOGY | Facility: CLINIC | Age: 80
End: 2020-05-27
Payer: MEDICARE

## 2020-05-27 VITALS
HEIGHT: 75 IN | OXYGEN SATURATION: 97 % | DIASTOLIC BLOOD PRESSURE: 68 MMHG | WEIGHT: 229.06 LBS | SYSTOLIC BLOOD PRESSURE: 146 MMHG | HEART RATE: 82 BPM | BODY MASS INDEX: 28.48 KG/M2

## 2020-05-27 DIAGNOSIS — I25.10 CORONARY ARTERY DISEASE, OCCLUSIVE: ICD-10-CM

## 2020-05-27 DIAGNOSIS — E78.2 COMBINED HYPERLIPIDEMIA ASSOCIATED WITH TYPE 2 DIABETES MELLITUS: Chronic | ICD-10-CM

## 2020-05-27 DIAGNOSIS — E11.69 COMBINED HYPERLIPIDEMIA ASSOCIATED WITH TYPE 2 DIABETES MELLITUS: Chronic | ICD-10-CM

## 2020-05-27 DIAGNOSIS — I25.10 CORONARY ARTERY DISEASE, OCCLUSIVE: Primary | ICD-10-CM

## 2020-05-27 DIAGNOSIS — I70.0 AORTIC CALCIFICATION: Chronic | ICD-10-CM

## 2020-05-27 DIAGNOSIS — I10 ESSENTIAL HYPERTENSION: Chronic | ICD-10-CM

## 2020-05-27 PROCEDURE — 3077F SYST BP >= 140 MM HG: CPT | Mod: HCNC,CPTII,S$GLB, | Performed by: NUCLEAR MEDICINE

## 2020-05-27 PROCEDURE — 3077F PR MOST RECENT SYSTOLIC BLOOD PRESSURE >= 140 MM HG: ICD-10-PCS | Mod: HCNC,CPTII,S$GLB, | Performed by: NUCLEAR MEDICINE

## 2020-05-27 PROCEDURE — 99214 OFFICE O/P EST MOD 30 MIN: CPT | Mod: HCNC,S$GLB,, | Performed by: NUCLEAR MEDICINE

## 2020-05-27 PROCEDURE — 3078F DIAST BP <80 MM HG: CPT | Mod: HCNC,CPTII,S$GLB, | Performed by: NUCLEAR MEDICINE

## 2020-05-27 PROCEDURE — 3288F PR FALLS RISK ASSESSMENT DOCUMENTED: ICD-10-PCS | Mod: HCNC,CPTII,S$GLB, | Performed by: NUCLEAR MEDICINE

## 2020-05-27 PROCEDURE — 1159F PR MEDICATION LIST DOCUMENTED IN MEDICAL RECORD: ICD-10-PCS | Mod: HCNC,S$GLB,, | Performed by: NUCLEAR MEDICINE

## 2020-05-27 PROCEDURE — 99999 PR PBB SHADOW E&M-EST. PATIENT-LVL III: CPT | Mod: PBBFAC,HCNC,, | Performed by: NUCLEAR MEDICINE

## 2020-05-27 PROCEDURE — 1100F PTFALLS ASSESS-DOCD GE2>/YR: CPT | Mod: HCNC,CPTII,S$GLB, | Performed by: NUCLEAR MEDICINE

## 2020-05-27 PROCEDURE — 99999 PR PBB SHADOW E&M-EST. PATIENT-LVL III: ICD-10-PCS | Mod: PBBFAC,HCNC,, | Performed by: NUCLEAR MEDICINE

## 2020-05-27 PROCEDURE — 3078F PR MOST RECENT DIASTOLIC BLOOD PRESSURE < 80 MM HG: ICD-10-PCS | Mod: HCNC,CPTII,S$GLB, | Performed by: NUCLEAR MEDICINE

## 2020-05-27 PROCEDURE — 93010 EKG 12-LEAD: ICD-10-PCS | Mod: HCNC,,, | Performed by: INTERNAL MEDICINE

## 2020-05-27 PROCEDURE — 93010 ELECTROCARDIOGRAM REPORT: CPT | Mod: HCNC,,, | Performed by: INTERNAL MEDICINE

## 2020-05-27 PROCEDURE — 1159F MED LIST DOCD IN RCRD: CPT | Mod: HCNC,S$GLB,, | Performed by: NUCLEAR MEDICINE

## 2020-05-27 PROCEDURE — 99214 PR OFFICE/OUTPT VISIT, EST, LEVL IV, 30-39 MIN: ICD-10-PCS | Mod: HCNC,S$GLB,, | Performed by: NUCLEAR MEDICINE

## 2020-05-27 PROCEDURE — 99499 UNLISTED E&M SERVICE: CPT | Mod: HCNC,S$GLB,, | Performed by: NUCLEAR MEDICINE

## 2020-05-27 PROCEDURE — 99499 RISK ADDL DX/OHS AUDIT: ICD-10-PCS | Mod: HCNC,S$GLB,, | Performed by: NUCLEAR MEDICINE

## 2020-05-27 PROCEDURE — 93005 ELECTROCARDIOGRAM TRACING: CPT | Mod: HCNC

## 2020-05-27 PROCEDURE — 3288F FALL RISK ASSESSMENT DOCD: CPT | Mod: HCNC,CPTII,S$GLB, | Performed by: NUCLEAR MEDICINE

## 2020-05-27 PROCEDURE — 1100F PR PT FALLS ASSESS DOC 2+ FALLS/FALL W/INJURY/YR: ICD-10-PCS | Mod: HCNC,CPTII,S$GLB, | Performed by: NUCLEAR MEDICINE

## 2020-05-27 NOTE — PROGRESS NOTES
Subjective:   Patient ID:  Srinath Mcknight is a 79 y.o. male who presents for follow-up of Coronary Artery Disease; Hypertension (ESSENTIAL); ATHEROSCLEROSIS OF AORTA; and Hyperlipidemia      HPI NO RECURRENT ANGINA OR EQUIVALENT  NO RECENT HOSPITALIZATIONS OR ED VISITS FOR ACS OR ADHF OR ARRHYTHMIAS  NO FOCAL CNS SYMPTOMS OR SIGNS TO SUGGEST TIA OR STROKE  NO UNUSUAL DAMON.NO ORTHOPNEA OR PND  NO PALPITATIONS  NO NEAR SYNCOPE OR SYNCOPE  NO ABDOMINAL DISCOMFORT  NO BACK DISCOMFORT  NO EDEMA. NO CALVE TENDERNESS  ECG TODAY- SR, 84, LAD, NO ACUTE ST T CHANGES  CARD MED GOOD COMPLIANCE. NO SIDE EFFECTS    Review of Systems   Constitution: Negative for chills, fever, night sweats, weight gain and weight loss.   HENT: Negative for nosebleeds.    Eyes: Negative for blurred vision, double vision and visual disturbance.   Cardiovascular: Negative for chest pain, dyspnea on exertion, irregular heartbeat, leg swelling, orthopnea, palpitations, paroxysmal nocturnal dyspnea and syncope.   Respiratory: Positive for snoring. Negative for cough, hemoptysis and wheezing.    Endocrine: Negative for polydipsia and polyuria.   Hematologic/Lymphatic: Does not bruise/bleed easily.   Skin: Negative for rash.   Musculoskeletal: Negative for joint pain, joint swelling, muscle weakness and myalgias.   Gastrointestinal: Negative for abdominal pain, hematemesis, jaundice and melena.   Genitourinary: Negative for dysuria, hematuria and nocturia.   Neurological: Negative for dizziness, focal weakness, headaches, sensory change and weakness.   Psychiatric/Behavioral: Negative for depression. The patient does not have insomnia and is not nervous/anxious.      Family History   Problem Relation Age of Onset    Hypertension Mother     Heart disease Mother     Diabetes Mother     Hypertension Father     Heart disease Father     Diabetes Father     Stroke Father     Diabetes Sister     Cancer Brother 50        pancreas    Drug abuse Brother      Prostate cancer Neg Hx     Macular degeneration Neg Hx     Retinal detachment Neg Hx     Strabismus Neg Hx     Glaucoma Neg Hx     Blindness Neg Hx     Amblyopia Neg Hx     Kidney disease Neg Hx     Mental illness Neg Hx     Mental retardation Neg Hx     COPD Neg Hx     Asthma Neg Hx      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    Trouble in sleeping     Type 2 diabetes mellitus with diabetic polyneuropathy, without long-term current use of insulin     Urinary incontinence     Uses hearing aid     bilat     Social History     Socioeconomic History    Marital status:      Spouse name: Not on file    Number of children: 0    Years of education: Not on file    Highest education level: Not on file   Occupational History    Occupation: retired     Comment: teacher   Social Needs    Financial resource strain: Not on file    Food insecurity:     Worry: Not on file     Inability: Not on file    Transportation needs:     Medical: Not on file     Non-medical: Not on file   Tobacco Use    Smoking status: Never Smoker    Smokeless tobacco: Never Used   Substance and Sexual Activity    Alcohol use: Yes     Alcohol/week: 1.0 standard drinks     Types: 1 Glasses of wine per week     Comment: rarely  No alcohol prior to surgery    Drug use: No    Sexual activity: Yes     Partners: Female     Birth control/protection: Condom   Lifestyle    Physical activity:     Days per week: Not on file     Minutes  per session: Not on file    Stress: Not at all   Relationships    Social connections:     Talks on phone: Not on file     Gets together: Not on file     Attends [employer] service: Not on file     Active member of club or organization: Not on file     Attends meetings of clubs or organizations: Not on file     Relationship status: Not on file   Other Topics Concern    Not on file   Social History Narrative    Single lives alone. No children. Retired but some part time work - 4 hours a day. 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 Caffeine intake - sugar free cola, Rare coffee intake. Still drives. Does have a Living Will . Has a nephew Jt Gayle, lives in [place]. Has a friend Karina Rossi, locally who could help him.      Current Outpatient Medications on File Prior to Visit   Medication Sig Dispense Refill    ACCU-CHEK FRANKO PLUS TEST STRP Strp TEST BLOOD SUGAR EVERY  strip 0    ACCU-CHEK SOFTCLIX LANCETS Misc TEST ONE TIME DAILY 100 each 11    allopurinoL (ZYLOPRIM) 100 MG tablet TAKE 1 TABLET EVERY DAY 90 tablet 3    aspirin (ECOTRIN) 81 MG EC tablet Take 1 tablet (81 mg total) by mouth once daily.  0    bisacodyl (DULCOLAX) 10 mg Supp   0    blood-glucose meter (ACCU-CHEK FRANKO PLUS METER) Misc 1 Device by Misc.(Non-Drug; Combo Route) route once daily. 1 each 0    clonazePAM (KLONOPIN) 0.5 MG disintegrating tablet DISSOLVE ONE TABLET BY MOUTH EVERY NIGHT AT BEDTIME 90 tablet 0    cyanocobalamin, vitamin B-12, 1,000 mcg Subl Place 1,000 mcg under the tongue once daily. 90 tablet 3    docusate sodium (COLACE) 100 MG capsule Take 1 capsule (100 mg total) by mouth 2 (two) times daily as needed. (Patient taking differently: Take 100 mg by mouth 2 (two) times daily. Per [facility]) 60 capsule 0    ferrous sulfate 324 mg (65 mg iron) TbEC Take 1 tablet (324 mg total) by mouth once daily.      finasteride (PROSCAR) 5 mg tablet Take 1 tablet (5 mg total) by mouth once daily. 30 tablet 6     gabapentin (NEURONTIN) 300 MG capsule Take 300 mg by mouth 3 (three) times daily.      GARLIC EXTRACT ORAL Take 1 tablet by mouth once daily. Per [facility]      glipiZIDE (GLUCOTROL) 5 MG tablet Take 2 tablets (10 mg total) by mouth 2 (two) times daily with meals. 360 tablet 4    losartan (COZAAR) 50 MG tablet Take 1 tablet (50 mg total) by mouth once daily. 90 tablet 4    MELATONIN ORAL Take by mouth every evening.      metFORMIN (GLUCOPHAGE) 1000 MG tablet TAKE 1 TABLET TWICE DAILY WITH MEALS 180 tablet 3    methocarbamol (ROBAXIN) 500 MG Tab One or 2 tablets q 8 hours as needed for muscle/neck tightness/stiffness. 40 tablet 0    methylPREDNISolone (MEDROL, DEANDRE,) 4 mg tablet use as directed 1 Package 0    metoprolol succinate (TOPROL-XL) 50 MG 24 hr tablet TAKE 1 TABLET EVERY DAY 90 tablet 3    multivitamin (ONE DAILY MULTIVITAMIN) per tablet Take 1 tablet by mouth once daily.      pravastatin (PRAVACHOL) 40 MG tablet TAKE 1 TABLET EVERY DAY 90 tablet 3    tadalafil (CIALIS) 5 MG tablet Take 1 tablet (5 mg total) by mouth daily as needed for Erectile Dysfunction. 30 tablet 0    tamsulosin (FLOMAX) 0.4 mg Cap TAKE 1 CAPSULE (0.4 MG TOTAL) BY MOUTH ONCE DAILY. 90 capsule 3     No current facility-administered medications on file prior to visit.      Review of patient's allergies indicates:   Allergen Reactions    Sulfa (sulfonamide antibiotics)      Can't recall from 1995       Objective:     Physical Exam   Constitutional: He is oriented to person, place, and time. He appears well-developed. No distress.   HENT:   Head: Normocephalic.   Eyes: Pupils are equal, round, and reactive to light. Conjunctivae are normal.   Neck: Neck supple. No JVD present. No thyromegaly present.   Cardiovascular: Normal rate, regular rhythm, normal heart sounds and intact distal pulses. Exam reveals no gallop and no friction rub.   No murmur heard.  Pulses:       Carotid pulses are 2+ on the right side, and 2+ on  the left side.       Radial pulses are 2+ on the right side, and 2+ on the left side.        Femoral pulses are 2+ on the right side, and 2+ on the left side.       Popliteal pulses are 2+ on the right side, and 2+ on the left side.        Dorsalis pedis pulses are 2+ on the right side, and 2+ on the left side.        Posterior tibial pulses are 2+ on the right side, and 2+ on the left side.   Pulmonary/Chest: Breath sounds normal. He has no wheezes. He has no rales. He exhibits no tenderness.   Abdominal: Soft. Bowel sounds are normal. He exhibits no mass. There is no hepatosplenomegaly. There is no tenderness.   Musculoskeletal: He exhibits no edema or tenderness.        Cervical back: Normal.        Thoracic back: Normal.        Lumbar back: Normal.   Lymphadenopathy:     He has no cervical adenopathy.     He has no axillary adenopathy.        Right: No supraclavicular adenopathy present.        Left: No supraclavicular adenopathy present.   Neurological: He is alert and oriented to person, place, and time. He has normal strength. No sensory deficit. Gait normal.   Skin: Skin is warm. No cyanosis. No pallor. Nails show no clubbing.   Psychiatric: He has a normal mood and affect. His speech is normal and behavior is normal. Cognition and memory are normal.       Assessment:     1. Coronary artery disease, occlusive    2. Aortic calcification    3. Essential hypertension    4. Combined hyperlipidemia associated with type 2 diabetes mellitus        Plan:     Coronary artery disease, occlusive  NO ACTIVE MYOCARDIAL ISCHEMIA  NO ADHF  NO ARRHYTHMIAS    Aortic calcification  ASYMPTOMATIC    Essential hypertension  BP WELL CONTROLLED  CNS STATUS STABLE    Combined hyperlipidemia associated with type 2 diabetes mellitus  STATIN WELL TOLERATED    CONTINUE PRESENT CARD MANAGEMENT  RETURN IN 6 MONTHS

## 2020-05-29 ENCOUNTER — OFFICE VISIT (OUTPATIENT)
Dept: OPHTHALMOLOGY | Facility: CLINIC | Age: 80
End: 2020-05-29
Payer: MEDICARE

## 2020-05-29 DIAGNOSIS — E11.9 DIABETES MELLITUS TYPE 2 WITHOUT RETINOPATHY: Primary | ICD-10-CM

## 2020-05-29 DIAGNOSIS — H04.123 DRY EYES, BILATERAL: ICD-10-CM

## 2020-05-29 DIAGNOSIS — I10 ESSENTIAL HYPERTENSION: Chronic | ICD-10-CM

## 2020-05-29 DIAGNOSIS — H43.813 PVD (POSTERIOR VITREOUS DETACHMENT), BILATERAL: ICD-10-CM

## 2020-05-29 DIAGNOSIS — H52.4 BILATERAL PRESBYOPIA: ICD-10-CM

## 2020-05-29 PROCEDURE — 92014 PR EYE EXAM, EST PATIENT,COMPREHESV: ICD-10-PCS | Mod: HCNC,S$GLB,, | Performed by: OPTOMETRIST

## 2020-05-29 PROCEDURE — 99499 RISK ADDL DX/OHS AUDIT: ICD-10-PCS | Mod: HCNC,S$GLB,, | Performed by: OPTOMETRIST

## 2020-05-29 PROCEDURE — 99999 PR PBB SHADOW E&M-EST. PATIENT-LVL I: CPT | Mod: PBBFAC,HCNC,, | Performed by: OPTOMETRIST

## 2020-05-29 PROCEDURE — 99999 PR PBB SHADOW E&M-EST. PATIENT-LVL I: ICD-10-PCS | Mod: PBBFAC,HCNC,, | Performed by: OPTOMETRIST

## 2020-05-29 PROCEDURE — 92014 COMPRE OPH EXAM EST PT 1/>: CPT | Mod: HCNC,S$GLB,, | Performed by: OPTOMETRIST

## 2020-05-29 PROCEDURE — 99499 UNLISTED E&M SERVICE: CPT | Mod: HCNC,S$GLB,, | Performed by: OPTOMETRIST

## 2020-05-29 PROCEDURE — 92015 DETERMINE REFRACTIVE STATE: CPT | Mod: HCNC,S$GLB,, | Performed by: OPTOMETRIST

## 2020-05-29 PROCEDURE — 92015 PR REFRACTION: ICD-10-PCS | Mod: HCNC,S$GLB,, | Performed by: OPTOMETRIST

## 2020-05-29 NOTE — PROGRESS NOTES
HPI     Diabetic Eye Exam      Additional comments: annual              Comments     NIDDM exam.   Pt feels like he's having trouble seeing with his glasses on both distance   and near, he is also having foreign body sensation and watery/burning eyes   OU for about 3 months. His left eye gets matted together and he has to   open his eye with his hand.   Pt has being allergy drops & AT'S but states they only help a little bit.  Experiencing floaters.  Last eye exam 6/24/2019 TRF.   Update glasses RX.          Last edited by Doris Dobson on 5/29/2020  9:13 AM. (History)            Assessment /Plan     For exam results, see Encounter Report.    Diabetes mellitus type 2 without retinopathy    Essential hypertension    PVD (posterior vitreous detachment), bilateral    Dry eyes, bilateral    Bilateral presbyopia      No Background Diabetic Retinopathy    No HTN Retinopathy    Discussed signs and symptoms of retinal detachment.  Informed patient to return to clinic if any worsening of symptoms or decreased vision.    Refresh Gel qid or more prn OU    Dispense Final Rx for glasses.  RTC 1 year  Discussed above and answered questions.

## 2020-06-12 ENCOUNTER — OFFICE VISIT (OUTPATIENT)
Dept: HEMATOLOGY/ONCOLOGY | Facility: CLINIC | Age: 80
End: 2020-06-12
Payer: MEDICARE

## 2020-06-12 ENCOUNTER — LAB VISIT (OUTPATIENT)
Dept: LAB | Facility: HOSPITAL | Age: 80
End: 2020-06-12
Attending: NURSE PRACTITIONER
Payer: MEDICARE

## 2020-06-12 VITALS
TEMPERATURE: 97 F | WEIGHT: 231.69 LBS | OXYGEN SATURATION: 99 % | BODY MASS INDEX: 28.81 KG/M2 | HEIGHT: 75 IN | SYSTOLIC BLOOD PRESSURE: 138 MMHG | HEART RATE: 84 BPM | DIASTOLIC BLOOD PRESSURE: 73 MMHG | RESPIRATION RATE: 14 BRPM

## 2020-06-12 DIAGNOSIS — D64.9 CHRONIC ANEMIA: ICD-10-CM

## 2020-06-12 DIAGNOSIS — D64.9 CHRONIC ANEMIA: Primary | ICD-10-CM

## 2020-06-12 DIAGNOSIS — D53.9 NUTRITIONAL ANEMIA: ICD-10-CM

## 2020-06-12 DIAGNOSIS — N18.30 CKD (CHRONIC KIDNEY DISEASE) STAGE 3, GFR 30-59 ML/MIN: ICD-10-CM

## 2020-06-12 DIAGNOSIS — R76.8 ELEVATED SERUM IMMUNOGLOBULIN FREE LIGHT CHAIN LEVEL: ICD-10-CM

## 2020-06-12 LAB
ALBUMIN SERPL BCP-MCNC: 3.9 G/DL (ref 3.5–5.2)
ALP SERPL-CCNC: 74 U/L (ref 55–135)
ALT SERPL W/O P-5'-P-CCNC: 16 U/L (ref 10–44)
ANION GAP SERPL CALC-SCNC: 11 MMOL/L (ref 8–16)
AST SERPL-CCNC: 24 U/L (ref 10–40)
BASOPHILS # BLD AUTO: 0.03 K/UL (ref 0–0.2)
BASOPHILS NFR BLD: 0.6 % (ref 0–1.9)
BILIRUB SERPL-MCNC: 0.3 MG/DL (ref 0.1–1)
BUN SERPL-MCNC: 18 MG/DL (ref 8–23)
CALCIUM SERPL-MCNC: 9.4 MG/DL (ref 8.7–10.5)
CHLORIDE SERPL-SCNC: 110 MMOL/L (ref 95–110)
CO2 SERPL-SCNC: 25 MMOL/L (ref 23–29)
CREAT SERPL-MCNC: 1.5 MG/DL (ref 0.5–1.4)
DIFFERENTIAL METHOD: ABNORMAL
EOSINOPHIL # BLD AUTO: 0.4 K/UL (ref 0–0.5)
EOSINOPHIL NFR BLD: 6.8 % (ref 0–8)
ERYTHROCYTE [DISTWIDTH] IN BLOOD BY AUTOMATED COUNT: 15 % (ref 11.5–14.5)
EST. GFR  (AFRICAN AMERICAN): 51 ML/MIN/1.73 M^2
EST. GFR  (NON AFRICAN AMERICAN): 44 ML/MIN/1.73 M^2
GLUCOSE SERPL-MCNC: 116 MG/DL (ref 70–110)
HCT VFR BLD AUTO: 40.1 % (ref 40–54)
HGB BLD-MCNC: 13.1 G/DL (ref 14–18)
IMM GRANULOCYTES # BLD AUTO: 0.02 K/UL (ref 0–0.04)
IMM GRANULOCYTES NFR BLD AUTO: 0.4 % (ref 0–0.5)
IRON SERPL-MCNC: 58 UG/DL (ref 45–160)
LYMPHOCYTES # BLD AUTO: 2 K/UL (ref 1–4.8)
LYMPHOCYTES NFR BLD: 38.8 % (ref 18–48)
MCH RBC QN AUTO: 28.9 PG (ref 27–31)
MCHC RBC AUTO-ENTMCNC: 32.7 G/DL (ref 32–36)
MCV RBC AUTO: 88 FL (ref 82–98)
MONOCYTES # BLD AUTO: 0.5 K/UL (ref 0.3–1)
MONOCYTES NFR BLD: 9.1 % (ref 4–15)
NEUTROPHILS # BLD AUTO: 2.3 K/UL (ref 1.8–7.7)
NEUTROPHILS NFR BLD: 44.7 % (ref 38–73)
NRBC BLD-RTO: 0 /100 WBC
PLATELET # BLD AUTO: 212 K/UL (ref 150–350)
PMV BLD AUTO: 9.9 FL (ref 9.2–12.9)
POTASSIUM SERPL-SCNC: 4.2 MMOL/L (ref 3.5–5.1)
PROT SERPL-MCNC: 7.3 G/DL (ref 6–8.4)
RBC # BLD AUTO: 4.54 M/UL (ref 4.6–6.2)
SATURATED IRON: 17 % (ref 20–50)
SODIUM SERPL-SCNC: 146 MMOL/L (ref 136–145)
TOTAL IRON BINDING CAPACITY: 340 UG/DL (ref 250–450)
TRANSFERRIN SERPL-MCNC: 230 MG/DL (ref 200–375)
VIT B12 SERPL-MCNC: 958 PG/ML (ref 210–950)
WBC # BLD AUTO: 5.18 K/UL (ref 3.9–12.7)

## 2020-06-12 PROCEDURE — 36415 COLL VENOUS BLD VENIPUNCTURE: CPT | Mod: HCNC

## 2020-06-12 PROCEDURE — 99499 RISK ADDL DX/OHS AUDIT: ICD-10-PCS | Mod: HCNC,S$GLB,, | Performed by: NURSE PRACTITIONER

## 2020-06-12 PROCEDURE — 84165 PROTEIN E-PHORESIS SERUM: CPT | Mod: HCNC

## 2020-06-12 PROCEDURE — 86334 PATHOLOGIST INTERPRETATION IFE: ICD-10-PCS | Mod: 26,HCNC,, | Performed by: PATHOLOGY

## 2020-06-12 PROCEDURE — 3075F SYST BP GE 130 - 139MM HG: CPT | Mod: HCNC,CPTII,S$GLB, | Performed by: NURSE PRACTITIONER

## 2020-06-12 PROCEDURE — 99499 UNLISTED E&M SERVICE: CPT | Mod: HCNC,S$GLB,, | Performed by: NURSE PRACTITIONER

## 2020-06-12 PROCEDURE — 99999 PR PBB SHADOW E&M-EST. PATIENT-LVL III: ICD-10-PCS | Mod: PBBFAC,HCNC,, | Performed by: NURSE PRACTITIONER

## 2020-06-12 PROCEDURE — 1125F AMNT PAIN NOTED PAIN PRSNT: CPT | Mod: HCNC,S$GLB,, | Performed by: NURSE PRACTITIONER

## 2020-06-12 PROCEDURE — 3078F DIAST BP <80 MM HG: CPT | Mod: HCNC,CPTII,S$GLB, | Performed by: NURSE PRACTITIONER

## 2020-06-12 PROCEDURE — 99999 PR PBB SHADOW E&M-EST. PATIENT-LVL III: CPT | Mod: PBBFAC,HCNC,, | Performed by: NURSE PRACTITIONER

## 2020-06-12 PROCEDURE — 86334 IMMUNOFIX E-PHORESIS SERUM: CPT | Mod: 26,HCNC,, | Performed by: PATHOLOGY

## 2020-06-12 PROCEDURE — 1101F PR PT FALLS ASSESS DOC 0-1 FALLS W/OUT INJ PAST YR: ICD-10-PCS | Mod: HCNC,CPTII,S$GLB, | Performed by: NURSE PRACTITIONER

## 2020-06-12 PROCEDURE — 1101F PT FALLS ASSESS-DOCD LE1/YR: CPT | Mod: HCNC,CPTII,S$GLB, | Performed by: NURSE PRACTITIONER

## 2020-06-12 PROCEDURE — 1125F PR PAIN SEVERITY QUANTIFIED, PAIN PRESENT: ICD-10-PCS | Mod: HCNC,S$GLB,, | Performed by: NURSE PRACTITIONER

## 2020-06-12 PROCEDURE — 83520 IMMUNOASSAY QUANT NOS NONAB: CPT | Mod: HCNC

## 2020-06-12 PROCEDURE — 80053 COMPREHEN METABOLIC PANEL: CPT | Mod: HCNC

## 2020-06-12 PROCEDURE — 3075F PR MOST RECENT SYSTOLIC BLOOD PRESS GE 130-139MM HG: ICD-10-PCS | Mod: HCNC,CPTII,S$GLB, | Performed by: NURSE PRACTITIONER

## 2020-06-12 PROCEDURE — 99214 PR OFFICE/OUTPT VISIT, EST, LEVL IV, 30-39 MIN: ICD-10-PCS | Mod: HCNC,S$GLB,, | Performed by: NURSE PRACTITIONER

## 2020-06-12 PROCEDURE — 82607 VITAMIN B-12: CPT | Mod: HCNC

## 2020-06-12 PROCEDURE — 84165 PROTEIN E-PHORESIS SERUM: CPT | Mod: 26,HCNC,, | Performed by: PATHOLOGY

## 2020-06-12 PROCEDURE — 99214 OFFICE O/P EST MOD 30 MIN: CPT | Mod: HCNC,S$GLB,, | Performed by: NURSE PRACTITIONER

## 2020-06-12 PROCEDURE — 86334 IMMUNOFIX E-PHORESIS SERUM: CPT | Mod: HCNC

## 2020-06-12 PROCEDURE — 85025 COMPLETE CBC W/AUTO DIFF WBC: CPT | Mod: HCNC

## 2020-06-12 PROCEDURE — 84165 PATHOLOGIST INTERPRETATION SPE: ICD-10-PCS | Mod: 26,HCNC,, | Performed by: PATHOLOGY

## 2020-06-12 PROCEDURE — 1159F MED LIST DOCD IN RCRD: CPT | Mod: HCNC,S$GLB,, | Performed by: NURSE PRACTITIONER

## 2020-06-12 PROCEDURE — 1159F PR MEDICATION LIST DOCUMENTED IN MEDICAL RECORD: ICD-10-PCS | Mod: HCNC,S$GLB,, | Performed by: NURSE PRACTITIONER

## 2020-06-12 PROCEDURE — 3078F PR MOST RECENT DIASTOLIC BLOOD PRESSURE < 80 MM HG: ICD-10-PCS | Mod: HCNC,CPTII,S$GLB, | Performed by: NURSE PRACTITIONER

## 2020-06-12 PROCEDURE — 83540 ASSAY OF IRON: CPT | Mod: HCNC

## 2020-06-12 NOTE — ASSESSMENT & PLAN NOTE
Anemia multifactorial including CKD, chronic disease    Laboratory review overall stable. He remains anemic but hemoglobin has improved with hemoglobin 13.1. His baseline is 11-12 range. Anemia workup has been essentially unremarkable. Vitamin B12 on the lower end of normal at pervious visit. He was asked to take sublingual Vitamin B12 1,000 mcg daily.     Vitamin B12 and iron studies are pending    Try sublingual vitamin b12 and monitor response. Previous GI consult. Has upcoming EGD/Colonoscopy.     Previous Referral to Nephrology for CKD III. Appointment cancelled due to COVID concerns. Plans to reschedule    Continue B12 supplementation. F/u 6 months with repeat labs

## 2020-06-12 NOTE — PROGRESS NOTES
Subjective:       Patient ID: Srinath Mcknight is a 79 y.o. male.    Chief Complaint: F/U anemia    HPI:  79 y.o. Male with HTN, OA, chronic anemia, DM, CAD, HLD follows up in the Heme-Onc clinic for h/o chronic anemia of unknown origin. The patient has had workup for chronic anemia of unknown origin. The laboratory studies are unremarkable. The patient has two negative stool occult blood testing on file. The patient continues to take daily iron pills. He tells me he was instructed to take iron pills by his PCP but has no knowledge of iron deficiency history.  Upon review of the medical record the patient has not had documented iron deficiency but iron levels are low normal. Patient does have sickle cell trait. He tells me this was already known to him.     Today's visit: Patient presents today for follow up of his chronic anemia. He continues to take oral iron replacement. Taking vitamin B12 supplement without missed doses. He is without new complaints      Social History     Socioeconomic History    Marital status:      Spouse name: Not on file    Number of children: 0    Years of education: Not on file    Highest education level: Not on file   Occupational History    Occupation: retired     Comment: teacher   Social Needs    Financial resource strain: Not on file    Food insecurity:     Worry: Not on file     Inability: Not on file    Transportation needs:     Medical: Not on file     Non-medical: Not on file   Tobacco Use    Smoking status: Never Smoker    Smokeless tobacco: Never Used   Substance and Sexual Activity    Alcohol use: Yes     Alcohol/week: 1.0 standard drinks     Types: 1 Glasses of wine per week     Comment: rarely  No alcohol prior to surgery    Drug use: No    Sexual activity: Yes     Partners: Female     Birth control/protection: Condom   Lifestyle    Physical activity:     Days per week: Not on file     Minutes per session: Not on file    Stress: Not at all    Relationships    Social connections:     Talks on phone: Not on file     Gets together: Not on file     Attends Spiritism service: Not on file     Active member of club or organization: Not on file     Attends meetings of clubs or organizations: Not on file     Relationship status: Not on file   Other Topics Concern    Not on file   Social History Narrative    Single lives alone. No children. Retired but some part time work - 4 hours a day. 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 Caffeine intake - sugar free cola, Rare coffee intake. Still drives. Does have a Living Will . Has a nephew Jt Gayle, lives in [place]. Has a friend Karina Rossi, locally who could help him.        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    Trouble in sleeping     Type 2 diabetes mellitus with diabetic polyneuropathy, without long-term current use of insulin     Urinary incontinence     Uses hearing aid     bilat       Family History   Problem Relation Age of Onset    Hypertension Mother     Heart disease Mother     Diabetes Mother     Hypertension Father     Heart disease Father     Diabetes Father     Stroke Father     Diabetes Sister     Cancer Brother 50        pancreas    Drug abuse Brother     Prostate cancer Neg Hx     Macular degeneration Neg Hx     Retinal detachment Neg Hx     Strabismus Neg Hx     Glaucoma Neg Hx     Blindness Neg Hx     Amblyopia Neg Hx     Kidney disease Neg Hx     Mental illness Neg Hx      Mental retardation Neg Hx     COPD Neg Hx     Asthma Neg Hx        Past Surgical History:   Procedure Laterality Date    BACK SURGERY      HERNIA REPAIR Bilateral 04/18/2017    JOINT REPLACEMENT Left 10/09/2017    L YAHIR Dr. Alcocer    LAMINECTOMY  07/22/2016    left elbow  10/2017    procedure to remove gout    lumbar foraminotomy  03/2018    REVISION TOTAL HIP ARTHROPLASTY Left 9/11/2018    Procedure: REVISION, TOTAL ARTHROPLASTY, HIP;  Surgeon: Albaro Alcocer Sr., MD;  Location: [facility];  Service: Orthopedics;  Laterality: Left;    ROTATOR CUFF REPAIR Left 2006    SHOULDER ARTHROSCOPY W/ ROTATOR CUFF REPAIR Right 2009       Review of Systems   Constitutional: Negative for activity change, appetite change, chills, diaphoresis, fatigue, fever and unexpected weight change.   HENT: Negative for nosebleeds, sore throat, trouble swallowing and voice change.    Eyes: Negative for visual disturbance.   Respiratory: Negative for cough, chest tightness, shortness of breath and wheezing.    Cardiovascular: Negative for chest pain, palpitations and leg swelling.   Gastrointestinal: Negative for abdominal distention, abdominal pain, anal bleeding, blood in stool, constipation, diarrhea, nausea and vomiting.   Genitourinary: Negative for difficulty urinating, dysuria and hematuria.   Musculoskeletal: Positive for arthralgias and gait problem (utilizes cane). Negative for back pain and myalgias.        Patient walks with a cane   Skin: Negative for rash and wound.   Neurological: Negative for dizziness, weakness, light-headedness and headaches.   Hematological: Does not bruise/bleed easily.   Psychiatric/Behavioral: The patient is nervous/anxious.          Medication List with Changes/Refills   Current Medications    ACCU-CHEK FRANKO PLUS TEST STRP STRP    TEST BLOOD SUGAR EVERY DAY    ACCU-CHEK SOFTCLIX LANCETS MISC    TEST ONE TIME DAILY    ALLOPURINOL (ZYLOPRIM) 100 MG TABLET    TAKE 1 TABLET EVERY DAY    ASPIRIN  (ECOTRIN) 81 MG EC TABLET    Take 1 tablet (81 mg total) by mouth once daily.    BISACODYL (DULCOLAX) 10 MG SUPP        BLOOD-GLUCOSE METER (ACCU-CHEK FRANKO PLUS METER) MISC    1 Device by Misc.(Non-Drug; Combo Route) route once daily.    CLONAZEPAM (KLONOPIN) 0.5 MG DISINTEGRATING TABLET    DISSOLVE ONE TABLET BY MOUTH EVERY NIGHT AT BEDTIME    CYANOCOBALAMIN, VITAMIN B-12, 1,000 MCG SUBL    Place 1,000 mcg under the tongue once daily.    DOCUSATE SODIUM (COLACE) 100 MG CAPSULE    Take 1 capsule (100 mg total) by mouth 2 (two) times daily as needed.    FERROUS SULFATE 324 MG (65 MG IRON) TBEC    Take 1 tablet (324 mg total) by mouth once daily.    FINASTERIDE (PROSCAR) 5 MG TABLET    Take 1 tablet (5 mg total) by mouth once daily.    GABAPENTIN (NEURONTIN) 300 MG CAPSULE    Take 300 mg by mouth 3 (three) times daily.    GARLIC EXTRACT ORAL    Take 1 tablet by mouth once daily. Per [facility]    GLIPIZIDE (GLUCOTROL) 5 MG TABLET    Take 2 tablets (10 mg total) by mouth 2 (two) times daily with meals.    LOSARTAN (COZAAR) 50 MG TABLET    Take 1 tablet (50 mg total) by mouth once daily.    MELATONIN ORAL    Take by mouth every evening.    METFORMIN (GLUCOPHAGE) 1000 MG TABLET    TAKE 1 TABLET TWICE DAILY WITH MEALS    METHOCARBAMOL (ROBAXIN) 500 MG TAB    One or 2 tablets q 8 hours as needed for muscle/neck tightness/stiffness.    METHYLPREDNISOLONE (MEDROL, DEANDRE,) 4 MG TABLET    use as directed    METOPROLOL SUCCINATE (TOPROL-XL) 50 MG 24 HR TABLET    TAKE 1 TABLET EVERY DAY    MULTIVITAMIN (ONE DAILY MULTIVITAMIN) PER TABLET    Take 1 tablet by mouth once daily.    PRAVASTATIN (PRAVACHOL) 40 MG TABLET    TAKE 1 TABLET EVERY DAY    TADALAFIL (CIALIS) 5 MG TABLET    Take 1 tablet (5 mg total) by mouth daily as needed for Erectile Dysfunction.    TAMSULOSIN (FLOMAX) 0.4 MG CAP    TAKE 1 CAPSULE (0.4 MG TOTAL) BY MOUTH ONCE DAILY.     Objective:     Vitals:    06/12/20 1018   BP: 138/73   Pulse: 84   Resp: 14    Temp: 97.3 °F (36.3 °C)     Lab Results   Component Value Date    WBC 5.18 06/12/2020    HGB 13.1 (L) 06/12/2020    HCT 40.1 06/12/2020    MCV 88 06/12/2020     06/12/2020     Lab Results   Component Value Date    IRON 82 03/03/2020    TIBC 337 03/03/2020    FERRITIN 18 (L) 11/19/2019     BMP  Lab Results   Component Value Date     (H) 06/12/2020    K 4.2 06/12/2020     06/12/2020    CO2 25 06/12/2020    BUN 18 06/12/2020    CREATININE 1.5 (H) 06/12/2020    CALCIUM 9.4 06/12/2020    ANIONGAP 11 06/12/2020    ESTGFRAFRICA 51 (A) 06/12/2020    EGFRNONAA 44 (A) 06/12/2020     Lab Results   Component Value Date    ALT 16 06/12/2020    AST 24 06/12/2020    ALKPHOS 74 06/12/2020    BILITOT 0.3 06/12/2020               Physical Exam   Constitutional: He is oriented to person, place, and time. He appears well-developed and well-nourished. He is cooperative. No distress.   HENT:   Head: Normocephalic.   Right Ear: Hearing normal.   Left Ear: Hearing normal.   Nose: Nose normal.   Mouth/Throat: Oropharynx is clear and moist.   Eyes: Conjunctivae, EOM and lids are normal. Right eye exhibits no discharge. Left eye exhibits no discharge.   Neck: Normal range of motion. No thyroid mass present.   Cardiovascular: Normal rate, regular rhythm and normal heart sounds. Exam reveals no gallop and no friction rub.   No murmur heard.  Pulmonary/Chest: Effort normal and breath sounds normal. No respiratory distress. He has no wheezes. He has no rales. He exhibits no tenderness.   Abdominal: Soft. He exhibits no distension and no mass. There is no tenderness. There is no guarding.   Genitourinary:   Genitourinary Comments: deferred   Musculoskeletal: Normal range of motion. He exhibits no edema.   Patient walks with a cane.    Neurological: He is alert and oriented to person, place, and time.   Skin: Skin is warm, dry and intact.   Psychiatric: His speech is normal and behavior is normal. Thought content normal.    Vitals reviewed.       Assessment:     Problem List Items Addressed This Visit        Oncology    Chronic anemia - Primary     Anemia multifactorial including CKD, chronic disease    Laboratory review overall stable. He remains anemic but hemoglobin has improved with hemoglobin 13.1. His baseline is 11-12 range. Anemia workup has been essentially unremarkable. Vitamin B12 on the lower end of normal at pervious visit. He was asked to take sublingual Vitamin B12 1,000 mcg daily.     Vitamin B12 and iron studies are pending    Try sublingual vitamin b12 and monitor response. Previous GI consult. Has upcoming EGD/Colonoscopy.     Previous Referral to Nephrology for CKD III. Appointment cancelled due to COVID concerns. Plans to reschedule    Continue B12 supplementation. F/u 6 months with repeat labs           Relevant Orders    CBC auto differential    Comprehensive metabolic panel    Iron and TIBC    Ferritin    Protein electrophoresis, serum    Immunoglobulin free LT chains blood    Immunofixation electrophoresis    Immunofixation, 24 hour Urine    Protein electrophoresis, timed urine      Other Visit Diagnoses     CKD (chronic kidney disease) stage 3, GFR 30-59 ml/min        Relevant Orders    CBC auto differential    Comprehensive metabolic panel    Iron and TIBC    Ferritin    Protein electrophoresis, serum    Immunoglobulin free LT chains blood    Immunofixation electrophoresis    Immunofixation, 24 hour Urine    Protein electrophoresis, timed urine    Elevated serum immunoglobulin free light chain level        Relevant Orders    CBC auto differential    Comprehensive metabolic panel    Iron and TIBC    Ferritin    Protein electrophoresis, serum    Immunoglobulin free LT chains blood    Immunofixation electrophoresis    Immunofixation, 24 hour Urine    Protein electrophoresis, timed urine            Plan:     Chronic anemia  -     CBC auto differential; Future; Expected date: 06/12/2020  -     Comprehensive  metabolic panel; Future; Expected date: 06/12/2020  -     Iron and TIBC; Future; Expected date: 06/12/2020  -     Ferritin; Future; Expected date: 06/12/2020  -     Protein electrophoresis, serum; Future; Expected date: 06/12/2020  -     Immunoglobulin free LT chains blood; Future; Expected date: 06/12/2020  -     Immunofixation electrophoresis; Future; Expected date: 06/12/2020  -     Immunofixation, 24 hour Urine; Future  -     Protein electrophoresis, timed urine; Future    CKD (chronic kidney disease) stage 3, GFR 30-59 ml/min  -     CBC auto differential; Future; Expected date: 06/12/2020  -     Comprehensive metabolic panel; Future; Expected date: 06/12/2020  -     Iron and TIBC; Future; Expected date: 06/12/2020  -     Ferritin; Future; Expected date: 06/12/2020  -     Protein electrophoresis, serum; Future; Expected date: 06/12/2020  -     Immunoglobulin free LT chains blood; Future; Expected date: 06/12/2020  -     Immunofixation electrophoresis; Future; Expected date: 06/12/2020  -     Immunofixation, 24 hour Urine; Future  -     Protein electrophoresis, timed urine; Future    Elevated serum immunoglobulin free light chain level  -     CBC auto differential; Future; Expected date: 06/12/2020  -     Comprehensive metabolic panel; Future; Expected date: 06/12/2020  -     Iron and TIBC; Future; Expected date: 06/12/2020  -     Ferritin; Future; Expected date: 06/12/2020  -     Protein electrophoresis, serum; Future; Expected date: 06/12/2020  -     Immunoglobulin free LT chains blood; Future; Expected date: 06/12/2020  -     Immunofixation electrophoresis; Future; Expected date: 06/12/2020  -     Immunofixation, 24 hour Urine; Future  -     Protein electrophoresis, timed urine; Future              ZEINAB Yuen

## 2020-06-15 LAB
ALBUMIN SERPL ELPH-MCNC: 4.11 G/DL (ref 3.35–5.55)
ALPHA1 GLOB SERPL ELPH-MCNC: 0.26 G/DL (ref 0.17–0.41)
ALPHA2 GLOB SERPL ELPH-MCNC: 0.77 G/DL (ref 0.43–0.99)
B-GLOBULIN SERPL ELPH-MCNC: 0.71 G/DL (ref 0.5–1.1)
GAMMA GLOB SERPL ELPH-MCNC: 1.05 G/DL (ref 0.67–1.58)
INTERPRETATION SERPL IFE-IMP: NORMAL
KAPPA LC SER QL IA: 2.51 MG/DL (ref 0.33–1.94)
KAPPA LC/LAMBDA SER IA: 1.36 (ref 0.26–1.65)
LAMBDA LC SER QL IA: 1.84 MG/DL (ref 0.57–2.63)
PATHOLOGIST INTERPRETATION IFE: NORMAL
PATHOLOGIST INTERPRETATION SPE: NORMAL
PROT SERPL-MCNC: 6.9 G/DL (ref 6–8.4)

## 2020-06-26 NOTE — PRE-PROCEDURE INSTRUCTIONS
PAT call completed and patient educated on the bowel prep, clear liquid diet and procedure instructions. Medical history discussed and patient informed of arrival times 1030 and 2nd prep dose 0700. Pt will be accompanied by a family member and is made aware of limited-visitor policy, and is made aware that  is to remain during entire visit. All questions and concerns addressed. Bowel prep and instructions rev'd. Informed to take AM medications of losartan and metoprolol. NPO after 2nd bowel prep. Patient verbalizes understanding of teaching and all instructions. Pre-procedure Covid testing tmrw at the [place] location. Patient aware.

## 2020-06-27 ENCOUNTER — LAB VISIT (OUTPATIENT)
Dept: URGENT CARE | Facility: CLINIC | Age: 80
End: 2020-06-27
Payer: MEDICARE

## 2020-06-27 VITALS — TEMPERATURE: 97 F | HEART RATE: 79 BPM | OXYGEN SATURATION: 97 %

## 2020-06-27 DIAGNOSIS — D64.9 CHRONIC ANEMIA: ICD-10-CM

## 2020-06-27 PROCEDURE — U0003 INFECTIOUS AGENT DETECTION BY NUCLEIC ACID (DNA OR RNA); SEVERE ACUTE RESPIRATORY SYNDROME CORONAVIRUS 2 (SARS-COV-2) (CORONAVIRUS DISEASE [COVID-19]), AMPLIFIED PROBE TECHNIQUE, MAKING USE OF HIGH THROUGHPUT TECHNOLOGIES AS DESCRIBED BY CMS-2020-01-R: HCPCS | Mod: HCNC

## 2020-06-29 ENCOUNTER — ANESTHESIA EVENT (OUTPATIENT)
Dept: ENDOSCOPY | Facility: HOSPITAL | Age: 80
End: 2020-06-29
Payer: MEDICARE

## 2020-06-29 LAB — SARS-COV-2 RNA RESP QL NAA+PROBE: NOT DETECTED

## 2020-06-29 NOTE — ANESTHESIA PREPROCEDURE EVALUATION
06/29/2020  Srinath Mcknight is a 79 y.o., male.    Anesthesia Evaluation    I have reviewed the Patient Summary Reports.    I have reviewed the Nursing Notes. I have reviewed the NPO Status.   I have reviewed the Medications.     Review of Systems  Anesthesia Hx:  History of prior surgery of interest to airway management or planning: Previous anesthesia: General 9/11/2018 - hip surgery with general anesthesia.  Procedure performed at an [facility]. Airway issues documented on chart review include GETA    Social:  Non-Smoker, No Alcohol Use    Hematology/Oncology:         -- Anemia:   Cardiovascular:   Hypertension CAD   Angina ECG has been reviewed. Last visit with cards 5/27/2020. Pt stable/compensated with medical mgt. Echo from 2014 EF 55%, mild AI   Pulmonary:   Denies Asthma. Sleep Apnea    Renal/:   Chronic Renal Disease, CRI BPH Urinary incontinence   Hepatic/GI:   Bowel Prep. Denies GERD.    Musculoskeletal:   Arthritis  Gout Joint Disease:  Gout  Spine Disorders: lumbar Disc disease    Neurological:   Neuromuscular Disease, polyneuropathy   Endocrine:   Diabetes, well controlled, type 2        Physical Exam  General:  Well nourished    Airway/Jaw/Neck:  Airway Findings: General Airway Assessment: Adult     Dental:  Dental Findings: Periodontal disease, Severe        Mental Status:  Mental Status Findings:  Cooperative, Alert and Oriented         Anesthesia Plan  Type of Anesthesia, risks & benefits discussed:  Anesthesia Type:  general  Patient's Preference:   Intra-op Monitoring Plan: standard ASA monitors  Intra-op Monitoring Plan Comments:   Post Op Pain Control Plan:   Post Op Pain Control Plan Comments:   Induction:   IV  Beta Blocker:  Patient is on a Beta-Blocker and has received one dose within the past 24 hours (No further documentation required).       Informed Consent: Patient  understands risks and agrees with Anesthesia plan.  Questions answered. Anesthesia consent signed with patient.  ASA Score: 3     Day of Surgery Review of History & Physical:    H&P update referred to the provider.         Ready For Surgery From Anesthesia Perspective.

## 2020-06-30 ENCOUNTER — ANESTHESIA (OUTPATIENT)
Dept: ENDOSCOPY | Facility: HOSPITAL | Age: 80
End: 2020-06-30
Payer: MEDICARE

## 2020-06-30 ENCOUNTER — HOSPITAL ENCOUNTER (OUTPATIENT)
Facility: HOSPITAL | Age: 80
Discharge: HOME | End: 2020-06-30
Attending: INTERNAL MEDICINE | Admitting: INTERNAL MEDICINE
Payer: MEDICARE

## 2020-06-30 VITALS
SYSTOLIC BLOOD PRESSURE: 159 MMHG | DIASTOLIC BLOOD PRESSURE: 76 MMHG | HEART RATE: 58 BPM | OXYGEN SATURATION: 100 % | TEMPERATURE: 97 F | RESPIRATION RATE: 18 BRPM

## 2020-06-30 DIAGNOSIS — D64.9 CHRONIC ANEMIA: Primary | ICD-10-CM

## 2020-06-30 PROBLEM — R79.89 ELEVATED SERUM CREATININE: Chronic | Status: ACTIVE | Noted: 2018-08-13

## 2020-06-30 LAB — POCT GLUCOSE: 126 MG/DL (ref 70–110)

## 2020-06-30 PROCEDURE — 37000009 HC ANESTHESIA EA ADD 15 MINS: Mod: HCNC | Performed by: INTERNAL MEDICINE

## 2020-06-30 PROCEDURE — 27201089 HC SNARE, DISP (ANY): Mod: HCNC | Performed by: INTERNAL MEDICINE

## 2020-06-30 PROCEDURE — 45385 PR COLONOSCOPY,REMV LESN,SNARE: ICD-10-PCS | Mod: HCNC,,, | Performed by: INTERNAL MEDICINE

## 2020-06-30 PROCEDURE — D9220A PRA ANESTHESIA: Mod: HCNC,ANES,, | Performed by: ANESTHESIOLOGY

## 2020-06-30 PROCEDURE — 63600175 PHARM REV CODE 636 W HCPCS: Mod: HCNC | Performed by: NURSE ANESTHETIST, CERTIFIED REGISTERED

## 2020-06-30 PROCEDURE — 88305 TISSUE EXAM BY PATHOLOGIST: ICD-10-PCS | Mod: 26,HCNC,, | Performed by: PATHOLOGY

## 2020-06-30 PROCEDURE — 27201012 HC FORCEPS, HOT/COLD, DISP: Mod: HCNC | Performed by: INTERNAL MEDICINE

## 2020-06-30 PROCEDURE — 37000008 HC ANESTHESIA 1ST 15 MINUTES: Mod: HCNC | Performed by: INTERNAL MEDICINE

## 2020-06-30 PROCEDURE — D9220A PRA ANESTHESIA: ICD-10-PCS | Mod: HCNC,CRNA,, | Performed by: NURSE ANESTHETIST, CERTIFIED REGISTERED

## 2020-06-30 PROCEDURE — 45385 COLONOSCOPY W/LESION REMOVAL: CPT | Mod: HCNC | Performed by: INTERNAL MEDICINE

## 2020-06-30 PROCEDURE — D9220A PRA ANESTHESIA: ICD-10-PCS | Mod: HCNC,ANES,, | Performed by: ANESTHESIOLOGY

## 2020-06-30 PROCEDURE — 25000003 PHARM REV CODE 250: Mod: HCNC | Performed by: NURSE ANESTHETIST, CERTIFIED REGISTERED

## 2020-06-30 PROCEDURE — 88305 TISSUE EXAM BY PATHOLOGIST: CPT | Mod: 26,HCNC,, | Performed by: PATHOLOGY

## 2020-06-30 PROCEDURE — 43239 PR EGD, FLEX, W/BIOPSY, SGL/MULTI: ICD-10-PCS | Mod: HCNC,51,, | Performed by: INTERNAL MEDICINE

## 2020-06-30 PROCEDURE — 88305 TISSUE EXAM BY PATHOLOGIST: CPT | Mod: 59,HCNC | Performed by: PATHOLOGY

## 2020-06-30 PROCEDURE — 45385 COLONOSCOPY W/LESION REMOVAL: CPT | Mod: HCNC,,, | Performed by: INTERNAL MEDICINE

## 2020-06-30 PROCEDURE — 43239 EGD BIOPSY SINGLE/MULTIPLE: CPT | Mod: HCNC | Performed by: INTERNAL MEDICINE

## 2020-06-30 PROCEDURE — 82962 GLUCOSE BLOOD TEST: CPT | Mod: HCNC | Performed by: INTERNAL MEDICINE

## 2020-06-30 PROCEDURE — D9220A PRA ANESTHESIA: Mod: HCNC,CRNA,, | Performed by: NURSE ANESTHETIST, CERTIFIED REGISTERED

## 2020-06-30 PROCEDURE — 43239 EGD BIOPSY SINGLE/MULTIPLE: CPT | Mod: HCNC,51,, | Performed by: INTERNAL MEDICINE

## 2020-06-30 RX ORDER — LIDOCAINE HYDROCHLORIDE 20 MG/ML
INJECTION, SOLUTION EPIDURAL; INFILTRATION; INTRACAUDAL; PERINEURAL
Status: DISCONTINUED | OUTPATIENT
Start: 2020-06-30 | End: 2020-06-30

## 2020-06-30 RX ORDER — SODIUM CHLORIDE 0.9 % (FLUSH) 0.9 %
10 SYRINGE (ML) INJECTION
Status: DISCONTINUED | OUTPATIENT
Start: 2020-06-30 | End: 2020-06-30 | Stop reason: HOSPADM

## 2020-06-30 RX ORDER — PROPOFOL 10 MG/ML
VIAL (ML) INTRAVENOUS
Status: DISCONTINUED | OUTPATIENT
Start: 2020-06-30 | End: 2020-06-30

## 2020-06-30 RX ORDER — SODIUM CHLORIDE, SODIUM LACTATE, POTASSIUM CHLORIDE, CALCIUM CHLORIDE 600; 310; 30; 20 MG/100ML; MG/100ML; MG/100ML; MG/100ML
INJECTION, SOLUTION INTRAVENOUS CONTINUOUS PRN
Status: DISCONTINUED | OUTPATIENT
Start: 2020-06-30 | End: 2020-06-30

## 2020-06-30 RX ADMIN — PROPOFOL 100 MG: 10 INJECTION, EMULSION INTRAVENOUS at 12:06

## 2020-06-30 RX ADMIN — LIDOCAINE HYDROCHLORIDE 80 MG: 20 INJECTION, SOLUTION EPIDURAL; INFILTRATION; INTRACAUDAL; PERINEURAL at 12:06

## 2020-06-30 RX ADMIN — PROPOFOL 50 MG: 10 INJECTION, EMULSION INTRAVENOUS at 12:06

## 2020-06-30 RX ADMIN — PROPOFOL 30 MG: 10 INJECTION, EMULSION INTRAVENOUS at 12:06

## 2020-06-30 RX ADMIN — SODIUM CHLORIDE, SODIUM LACTATE, POTASSIUM CHLORIDE, AND CALCIUM CHLORIDE: 600; 310; 30; 20 INJECTION, SOLUTION INTRAVENOUS at 12:06

## 2020-06-30 RX ADMIN — PROPOFOL 50 MG: 10 INJECTION, EMULSION INTRAVENOUS at 01:06

## 2020-06-30 RX ADMIN — PROPOFOL 20 MG: 10 INJECTION, EMULSION INTRAVENOUS at 12:06

## 2020-06-30 NOTE — PLAN OF CARE
Patient discharged has no questions and or concerns at this time wheeled out to family members car.

## 2020-06-30 NOTE — DISCHARGE INSTRUCTIONS
Understanding Colon and Rectal Polyps    The colon (also called the large intestine) is a muscular tube that forms the last part of the digestive tract. It absorbs water and stores food waste. The colon is about 4 to 6 feet long. The rectum is the last 6 inches of the colon. The colon and rectum have a smooth lining composed of millions of cells. Changes in these cells can lead to growths in the colon that can become cancerous and should be removed. Multiple tests are available to screen for colon cancer, but the colonoscopy is the most recommended test. During colonoscopy, these polyps can be removed. How often you need this test depends on many things including your condition, your family history, symptoms, and what the findings were at the previous colonoscopy.   When the colon lining changes  Changes that happen in the cells that line the colon or rectum can lead to growths called polyps. Over a period of years, polyps can turn cancerous. Removing polyps early may prevent cancer from ever forming.  Polyps  Polyps are fleshy clumps of tissue that form on the lining of the colon or rectum. Small polyps are usually benign (not cancerous). However, over time, cells in a polyp can change and become cancerous. Certain types of polyps known as adenomatous polyps are premalignant. The risk for invasive cancer increases with the size of the polyp and certain cell and gene features. This means that they can become cancerous if they're not removed. Hyperplastic polyps are benign. They can grow quite large and not turn cancerous.   Cancer  Almost all colorectal cancers start when polyp cells begin growing abnormally. As a cancerous tumor grows, it may involve more and more of the colon or rectum. In time, cancer can also grow beyond the colon or rectum and spread to nearby organs or to glands called lymph nodes. The cells can also travel to other parts of the body. This is known as metastasis. The earlier a cancerous  tumor is removed, the better the chance of preventing its spread.    Date Last Reviewed: 8/1/2016  © 5233-9602 [employer]. [address] [place] [address], [place] [address]. All rights reserved. This information is not intended as a substitute for professional medical care. Always follow your healthcare professional's instructions.        Gastritis (Adult)    Gastritis is inflammation and irritation of the stomach lining. It can be present for a short time (acute) or be long lasting (chronic). Gastritis is often caused by infection with bacteria called H pylori. More than a third of people in the [place] have this bacteria in their bodies. In many cases, H pylori causes no problems or symptoms. In some people, though, the infection irritates the stomach lining and causes gastritis. Other causes of stomach irritation include drinking alcohol or taking pain-relieving medicines called NSAIDs (such as aspirin or ibuprofen).   Symptoms of gastritis can include:  · Abdominal pain or bloating  · Loss of appetite  · Nausea or vomiting  · Vomiting blood or having black stools  · Feeling more tired than usual  An inflamed and irritated stomach lining is more likely to develop a sore called an ulcer. To help prevent this, gastritis should be treated.  Home care  If needed, medicines may be prescribed. If you have H pylori infection, treating it will likely relieve your symptoms. Other changes can help reduce stomach irritation and help it heal.  · If you have been prescribed medicines for H pylori infection, take them as directed. Take all of the medicine until it is finished or your healthcare provider tells you to stop, even if you feel better.  · Your healthcare provider may recommend avoiding NSAIDs. If you take daily aspirin for your heart or other medical reasons, do not stop without talking to your healthcare provider first.  · Avoid drinking alcohol.  · Stop smoking. Smoking can irritate the stomach and delay  healing. As much as possible, stay away from second hand smoke.  Follow-up care  Follow up with your healthcare provider, or as advised by our staff. Testing may be needed to check for inflammation or an ulcer.  When to seek medical advice  Call your healthcare provider for any of the following:  · Stomach pain that gets worse or moves to the lower right abdomen (appendix area)  · Chest pain that appears or gets worse, or spreads to the back, neck, shoulder, or arm  · Frequent vomiting (cant keep down liquids)  · Blood in the stool or vomit (red or black in color)  · Feeling weak or dizzy  · Fever of 100.4ºF (38ºC) or higher, or as directed by your healthcare provider  Date Last Reviewed: 6/22/2015 © 2000-2017 [employer]. [address] [place] [address], [place] [address]. All rights reserved. This information is not intended as a substitute for professional medical care. Always follow your healthcare professional's instructions.

## 2020-06-30 NOTE — PROVATION PATIENT INSTRUCTIONS
Discharge Summary/Instructions after an Endoscopic Procedure  Patient Name: Srinath Mcknight  Patient MRN: [id number]  Patient YOB: 1940 Tuesday, June 30, 2020  Joseph Iraheta MD  RESTRICTIONS:  During your procedure today, you received medications for sedation.  These   medications may affect your judgment, balance and coordination.  Therefore,   for 24 hours, you have the following restrictions:   - DO NOT drive a car, operate machinery, make legal/financial decisions,   sign important papers or drink alcohol.    ACTIVITY:  Today: no heavy lifting, straining or running due to procedural   sedation/anesthesia.  The following day: return to full activity including work.  DIET:  Eat and drink normally unless instructed otherwise.     TREATMENT FOR COMMON SIDE EFFECTS:  - Mild abdominal pain, nausea, belching, bloating or excessive gas:  rest,   eat lightly and use a heating pad.  - Sore Throat: treat with throat lozenges and/or gargle with warm salt   water.  - Because air was used during the procedure, expelling large amounts of air   from your rectum or belching is normal.  - If a bowel prep was taken, you may not have a bowel movement for 1-3 days.    This is normal.  SYMPTOMS TO WATCH FOR AND REPORT TO YOUR PHYSICIAN:  1. Abdominal pain or bloating, other than gas cramps.  2. Chest pain.  3. Back pain.  4. Signs of infection such as: chills or fever occurring within 24 hours   after the procedure.  5. Rectal bleeding, which would show as bright red, maroon, or black stools.   (A tablespoon of blood from the rectum is not serious, especially if   hemorrhoids are present.)  6. Vomiting.  7. Weakness or dizziness.  GO DIRECTLY TO THE NEAREST EMERGENCY ROOM IF YOU HAVE ANY OF THE FOLLOWING:      Difficulty breathing              Chills and/or fever over 101 F   Persistent vomiting and/or vomiting blood   Severe abdominal pain   Severe chest pain   Black, tarry stools   Bleeding- more than one  tablespoon   Any other symptom or condition that you feel may need urgent attention  Your doctor recommends these additional instructions:  If any biopsies were taken, your doctors clinic will contact you in 1 to 2   weeks with any results.  - Await pathology results.   - Discharge patient to home.   - Resume previous diet.   - Continue present medications.  For questions, problems or results please call your physician Joseph Iraheta MD at Work:  [phone]  If you have any questions about the above instructions, call the GI   department at [phone] or call the endoscopy unit at [phone]   from 7am until 3 pm.  [facility] [place], EMERGENCY ROOM PHONE NUMBER:   [phone]  IF A COMPLICATION OR EMERGENCY SITUATION ARISES AND YOU ARE UNABLE TO REACH   YOUR PHYSICIAN - GO DIRECTLY TO THE EMERGENCY ROOM.  I have read or have had read to me these discharge instructions for my   procedure and have received a written copy.  I understand these   instructions and will follow-up with my physician if I have any questions.     __________________________________       _____________________________________  Nurse Signature                                          Patient/Designated   Responsible Party Signature  MD Joseph Simon MD  6/30/2020 1:26:35 PM  This report has been verified and signed electronically.  [employer]ATION

## 2020-06-30 NOTE — ANESTHESIA POSTPROCEDURE EVALUATION
Anesthesia Post Evaluation    Patient: Srinath Mcknight    Procedure(s) Performed: Procedure(s) (LRB):  EGD (ESOPHAGOGASTRODUODENOSCOPY) (N/A)  COLONOSCOPY (N/A)    Final Anesthesia Type: general    Patient location during evaluation: GI PACU  Patient participation: Yes- Able to Participate  Level of consciousness: awake and alert and oriented  Post-procedure vital signs: reviewed and stable  Pain management: adequate  Airway patency: patent    PONV status at discharge: No PONV  Anesthetic complications: no      Cardiovascular status: hemodynamically stable, hypertensive and blood pressure returned to baseline  Respiratory status: unassisted, spontaneous ventilation and room air  Hydration status: euvolemic  Follow-up not needed.          Vitals Value Taken Time   /76 06/30/20 1350   Temp 36.2 °C (97.2 °F) 06/30/20 1328   Pulse 58 06/30/20 1350   Resp 18 06/30/20 1350   SpO2 100 % 06/30/20 1350         Event Time   Out of Recovery 13:58:00         Pain/Niki Score: Niki Score: 10 (6/30/2020  1:45 PM)

## 2020-06-30 NOTE — TRANSFER OF CARE
Anesthesia Transfer of Care Note    Patient: Srinath Mcknight    Procedure(s) Performed: Procedure(s) (LRB):  EGD (ESOPHAGOGASTRODUODENOSCOPY) (N/A)  COLONOSCOPY (N/A)    Patient location: PACU    Anesthesia Type: general    Transport from OR: Transported from OR on room air with adequate spontaneous ventilation    Post pain: adequate analgesia    Post assessment: no apparent anesthetic complications    Post vital signs: stable    Level of consciousness: sedated    Nausea/Vomiting: no nausea/vomiting    Complications: none    Transfer of care protocol was followed      Last vitals:   Visit Vitals  BP (!) 152/70   Pulse 72   Temp 36.2 °C (97.2 °F)   Resp 18   SpO2 (!) 91%

## 2020-06-30 NOTE — PLAN OF CARE
Patient in recovery doing very well, doesn't want anything to drink, VSS, alert and fully awake at this time. I went over all discharge instructions with patient along with post-op care, verbalized understanding has no questions and or concerns. IV taken out all belongings with patient.

## 2020-06-30 NOTE — PROVATION PATIENT INSTRUCTIONS
Discharge Summary/Instructions after an Endoscopic Procedure  Patient Name: Srinath Mcknight  Patient MRN: [id number]  Patient YOB: 1940 Tuesday, June 30, 2020  Joseph Iraheta MD  RESTRICTIONS:  During your procedure today, you received medications for sedation.  These   medications may affect your judgment, balance and coordination.  Therefore,   for 24 hours, you have the following restrictions:   - DO NOT drive a car, operate machinery, make legal/financial decisions,   sign important papers or drink alcohol.    ACTIVITY:  Today: no heavy lifting, straining or running due to procedural   sedation/anesthesia.  The following day: return to full activity including work.  DIET:  Eat and drink normally unless instructed otherwise.     TREATMENT FOR COMMON SIDE EFFECTS:  - Mild abdominal pain, nausea, belching, bloating or excessive gas:  rest,   eat lightly and use a heating pad.  - Sore Throat: treat with throat lozenges and/or gargle with warm salt   water.  - Because air was used during the procedure, expelling large amounts of air   from your rectum or belching is normal.  - If a bowel prep was taken, you may not have a bowel movement for 1-3 days.    This is normal.  SYMPTOMS TO WATCH FOR AND REPORT TO YOUR PHYSICIAN:  1. Abdominal pain or bloating, other than gas cramps.  2. Chest pain.  3. Back pain.  4. Signs of infection such as: chills or fever occurring within 24 hours   after the procedure.  5. Rectal bleeding, which would show as bright red, maroon, or black stools.   (A tablespoon of blood from the rectum is not serious, especially if   hemorrhoids are present.)  6. Vomiting.  7. Weakness or dizziness.  GO DIRECTLY TO THE NEAREST EMERGENCY ROOM IF YOU HAVE ANY OF THE FOLLOWING:      Difficulty breathing              Chills and/or fever over 101 F   Persistent vomiting and/or vomiting blood   Severe abdominal pain   Severe chest pain   Black, tarry stools   Bleeding- more than one  tablespoon   Any other symptom or condition that you feel may need urgent attention  Your doctor recommends these additional instructions:  If any biopsies were taken, your doctors clinic will contact you in 1 to 2   weeks with any results.  - Discharge patient to home.   - Resume previous diet.   - Continue present medications.   - Await pathology results.   - Repeat colonoscopy in 5 years for surveillance based on pathology results.     - If biopsies from EGD are normal then video capsule endoscopy will be   required for further work up of iron deficiency anemia  For questions, problems or results please call your physician Joseph Iraheta MD at Work:  [phone]  If you have any questions about the above instructions, call the GI   department at [phone] or call the endoscopy unit at [phone]   from 7am until 3 pm.  [facility] [place], EMERGENCY ROOM PHONE NUMBER:   [phone]  IF A COMPLICATION OR EMERGENCY SITUATION ARISES AND YOU ARE UNABLE TO REACH   YOUR PHYSICIAN - GO DIRECTLY TO THE EMERGENCY ROOM.  I have read or have had read to me these discharge instructions for my   procedure and have received a written copy.  I understand these   instructions and will follow-up with my physician if I have any questions.     __________________________________       _____________________________________  Nurse Signature                                          Patient/Designated   Responsible Party Signature  MD Joseph Simon MD  6/30/2020 1:30:36 PM  This report has been verified and signed electronically.  [employer]

## 2020-06-30 NOTE — PROGRESS NOTES
If the biopsies are normal from the EGD then next step is video capsule endoscopy to evaluate the anemia.    Joseph Iraheta MD  Gastroenterology

## 2020-06-30 NOTE — H&P
PRE PROCEDURE H&P    Patient Name: Srinath Mcknight  MRN: [id number]  : 1940  Date of Procedure:  2020  Referring Physician: Joseph Iraheta *  Primary Physician: Yeison Wilder MD  Procedure Physician: Joseph Iraheta MD       Planned Procedure: Colonoscopy and EGD  Diagnosis: anemia  Chief Complaint: Same as above    HPI: Patient is an 79 y.o. male is here for the above.         Past Medical History:   Past Medical History:   Diagnosis Date    Anemia due to unknown mechanism 11/10/2015    Angina pectoris     not since    Arthritis     Back pain     Coronary artery disease     DM (diabetes mellitus) 25-30 years     am 05/15/2019    Encounter for blood transfusion     Gout 2017    right foot     History of blood transfusion     Hyperlipemia     Hypertension     CHARLES (obstructive sleep apnea)     Polyneuropathy     Spinal cord disease     Status post total replacement of left hip 2018    Trouble in sleeping     Type 2 diabetes mellitus with diabetic polyneuropathy, without long-term current use of insulin     Urinary incontinence     Uses hearing aid     bilat        Past Surgical History:  Past Surgical History:   Procedure Laterality Date    BACK SURGERY      HERNIA REPAIR Bilateral 2017    JOINT REPLACEMENT Left 10/09/2017    L YAHIR Dr. Alcocer    LAMINECTOMY  2016    left elbow  10/2017    procedure to remove gout    lumbar foraminotomy  2018    REVISION TOTAL HIP ARTHROPLASTY Left 2018    Procedure: REVISION, TOTAL ARTHROPLASTY, HIP;  Surgeon: Albaro Alcocer Sr., MD;  Location: [facility];  Service: Orthopedics;  Laterality: Left;    ROTATOR CUFF REPAIR Left     SHOULDER ARTHROSCOPY W/ ROTATOR CUFF REPAIR Right         Home Medications:  Prior to Admission medications    Medication Sig Start Date End Date Taking? Authorizing Provider   allopurinoL (ZYLOPRIM) 100 MG tablet TAKE 1 TABLET EVERY DAY 20  Yes Yeison  KARLOS Wilder MD   aspirin (ECOTRIN) 81 MG EC tablet Take 1 tablet (81 mg total) by mouth once daily. 9/14/18 6/30/20 Yes Cyndi Dave NP   cyanocobalamin, vitamin B-12, 1,000 mcg Subl Place 1,000 mcg under the tongue once daily. 3/6/20  Yes Megha George NP   ferrous sulfate 324 mg (65 mg iron) TbEC Take 1 tablet (324 mg total) by mouth once daily. 11/7/18  Yes BIJAN Crews   gabapentin (NEURONTIN) 300 MG capsule Take 300 mg by mouth 3 (three) times daily.   Yes Historical Provider, MD   GARLIC EXTRACT ORAL Take 1 tablet by mouth once daily. Per [facility]   Yes Historical Provider, MD   glipiZIDE (GLUCOTROL) 5 MG tablet Take 2 tablets (10 mg total) by mouth 2 (two) times daily with meals. 8/2/19  Yes Yeison Wilder MD   losartan (COZAAR) 50 MG tablet Take 1 tablet (50 mg total) by mouth once daily. 9/12/19  Yes Yeison Wilder MD   MELATONIN ORAL Take by mouth every evening.   Yes Historical Provider, MD   metFORMIN (GLUCOPHAGE) 1000 MG tablet TAKE 1 TABLET TWICE DAILY WITH MEALS 11/18/19  Yes Yeison Wilder MD   methocarbamol (ROBAXIN) 500 MG Tab One or 2 tablets q 8 hours as needed for muscle/neck tightness/stiffness. 1/9/20  Yes Kj Polk MD   methylPREDNISolone (MEDROL, DEANDRE,) 4 mg tablet use as directed 1/9/20  Yes Kj Polk MD   metoprolol succinate (TOPROL-XL) 50 MG 24 hr tablet TAKE 1 TABLET EVERY DAY 2/13/20  Yes Elver Cobos MD   multivitamin (ONE DAILY MULTIVITAMIN) per tablet Take 1 tablet by mouth once daily.   Yes Historical Provider, MD   pravastatin (PRAVACHOL) 40 MG tablet TAKE 1 TABLET EVERY DAY 8/9/19  Yes Yeison Wilder MD   tamsulosin (FLOMAX) 0.4 mg Cap TAKE 1 CAPSULE (0.4 MG TOTAL) BY MOUTH ONCE DAILY. 5/4/20 5/4/21 Yes Yeison Wilder MD   ACCU-CHEK FRANKO PLUS TEST STRP Strp TEST BLOOD SUGAR EVERY DAY 11/22/19   Yeison Wilder MD   ACCU-CHEK SOFTCLIX LANCETS Misc TEST ONE TIME DAILY 11/22/19   Yeison Wilder MD   bisacodyl (DULCOLAX)  10 mg Supp  9/1/19   Historical Provider, MD   blood-glucose meter (ACCU-CHEK FRANKO PLUS METER) Misc 1 Device by Misc.(Non-Drug; Combo Route) route once daily. 8/9/18   Yeison Wilder MD   clonazePAM (KLONOPIN) 0.5 MG disintegrating tablet DISSOLVE ONE TABLET BY MOUTH EVERY NIGHT AT BEDTIME 5/29/19   Martin Machuca MD   docusate sodium (COLACE) 100 MG capsule Take 1 capsule (100 mg total) by mouth 2 (two) times daily as needed.  Patient taking differently: Take 100 mg by mouth 2 (two) times daily. Per [facility] 7/22/16   Elijah Dave MD   finasteride (PROSCAR) 5 mg tablet Take 1 tablet (5 mg total) by mouth once daily. 9/12/19 5/29/20  Yeison Wilder MD   tadalafil (CIALIS) 5 MG tablet Take 1 tablet (5 mg total) by mouth daily as needed for Erectile Dysfunction. 11/7/18 5/29/20  Yeison Wilder MD        Allergies:  Review of patient's allergies indicates:   Allergen Reactions    Sulfa (sulfonamide antibiotics)      Can't recall from 1995        Social History:   Social History     Socioeconomic History    Marital status:      Spouse name: Not on file    Number of children: 0    Years of education: Not on file    Highest education level: Not on file   Occupational History    Occupation: retired     Comment: teacher   Social Needs    Financial resource strain: Not on file    Food insecurity     Worry: Not on file     Inability: Not on file    Transportation needs     Medical: Not on file     Non-medical: Not on file   Tobacco Use    Smoking status: Never Smoker    Smokeless tobacco: Never Used   Substance and Sexual Activity    Alcohol use: Yes     Alcohol/week: 1.0 standard drinks     Types: 1 Glasses of wine per week     Comment: rarely  No alcohol prior to surgery    Drug use: No    Sexual activity: Yes     Partners: Female     Birth control/protection: Condom   Lifestyle    Physical activity     Days per week: Not on file     Minutes per session: Not on file    Stress:  Not at all   Relationships    Social connections     Talks on phone: Not on file     Gets together: Not on file     Attends Holiness service: Not on file     Active member of club or organization: Not on file     Attends meetings of clubs or organizations: Not on file     Relationship status: Not on file   Other Topics Concern    Not on file   Social History Narrative    Single lives alone. No children. Retired but some part time work - 4 hours a day. 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 Caffeine intake - sugar free cola, Rare coffee intake. Still drives. Does have a Living Will . Has a nephew Jt Gayle, lives in [place]. Has a friend Karina Rossi, locally who could help him.        Family History:  Family History   Problem Relation Age of Onset    Hypertension Mother     Heart disease Mother     Diabetes Mother     Hypertension Father     Heart disease Father     Diabetes Father     Stroke Father     Diabetes Sister     Cancer Brother 50        pancreas    Drug abuse Brother     Prostate cancer Neg Hx     Macular degeneration Neg Hx     Retinal detachment Neg Hx     Strabismus Neg Hx     Glaucoma Neg Hx     Blindness Neg Hx     Amblyopia Neg Hx     Kidney disease Neg Hx     Mental illness Neg Hx     Mental retardation Neg Hx     COPD Neg Hx     Asthma Neg Hx        ROS: No acute cardiac events, no acute respiratory complaints.     Physical Exam (all patients):    /68 (BP Location: Right arm, Patient Position: Sitting)   Pulse 73   Resp 18   SpO2 96%   Lungs: Clear to auscultation bilaterally, respirations unlabored  Heart: Regular rate and rhythm, S1 and S2 normal, no obvious murmurs  Abdomen:         Soft, non-tender, bowel sounds normal, no masses, no organomegaly    Lab Results   Component Value Date    WBC 5.18 06/12/2020    MCV 88 06/12/2020    RDW 15.0 (H) 06/12/2020     06/12/2020    INR 1.0 07/31/2019     (H) 06/12/2020    HGBA1C 5.9 (H) 06/12/2019    BUN 18  06/12/2020     (H) 06/12/2020    K 4.2 06/12/2020     06/12/2020        SEDATION PLAN: per anesthesia      History reviewed, vital signs satisfactory, cardiopulmonary status satisfactory, sedation options, risks and plans have been discussed with the patient  All their questions were answered and the patient agrees to the sedation procedures as planned and the patient is deemed an appropriate candidate for the sedation as planned.    Procedure explained to patient, informed consent obtained and placed in chart.    Joseph Iraheta  6/30/2020  12:18 PM

## 2020-07-02 LAB
FINAL PATHOLOGIC DIAGNOSIS: NORMAL
GROSS: NORMAL

## 2020-07-06 ENCOUNTER — PATIENT OUTREACH (OUTPATIENT)
Dept: ADMINISTRATIVE | Facility: HOSPITAL | Age: 80
End: 2020-07-06

## 2020-07-06 DIAGNOSIS — K63.5 POLYP OF COLON, UNSPECIFIED PART OF COLON, UNSPECIFIED TYPE: ICD-10-CM

## 2020-07-06 NOTE — PROGRESS NOTES
HM reviewed and updated. Immunizations abstracted. Last /76. Updated GI in care teams.  Care Everywhere abstracted. DigMed:herrera   AWV:n/a  CC note updated.  Health Maintenance Due   Topic    Aspirin/Antiplatelet Therapy     Shingles Vaccine (1 of 2)    Hemoglobin A1c     PROSTATE-SPECIFIC ANTIGEN     Lipid Panel      Previsit chart audit completed.  *KDL*

## 2020-08-04 ENCOUNTER — PES CALL (OUTPATIENT)
Dept: ADMINISTRATIVE | Facility: CLINIC | Age: 80
End: 2020-08-04

## 2020-08-17 ENCOUNTER — OFFICE VISIT (OUTPATIENT)
Dept: INTERNAL MEDICINE | Facility: CLINIC | Age: 80
End: 2020-08-17
Payer: MEDICARE

## 2020-08-17 ENCOUNTER — LAB VISIT (OUTPATIENT)
Dept: LAB | Facility: HOSPITAL | Age: 80
End: 2020-08-17
Attending: INTERNAL MEDICINE
Payer: MEDICARE

## 2020-08-17 VITALS
BODY MASS INDEX: 28.65 KG/M2 | HEART RATE: 80 BPM | TEMPERATURE: 97 F | WEIGHT: 230.38 LBS | SYSTOLIC BLOOD PRESSURE: 136 MMHG | DIASTOLIC BLOOD PRESSURE: 78 MMHG | HEIGHT: 75 IN

## 2020-08-17 DIAGNOSIS — I10 ESSENTIAL HYPERTENSION: Chronic | ICD-10-CM

## 2020-08-17 DIAGNOSIS — I25.10 CORONARY ARTERY DISEASE, OCCLUSIVE: ICD-10-CM

## 2020-08-17 DIAGNOSIS — Z12.5 ENCOUNTER FOR SCREENING FOR MALIGNANT NEOPLASM OF PROSTATE: ICD-10-CM

## 2020-08-17 DIAGNOSIS — E11.69 COMBINED HYPERLIPIDEMIA ASSOCIATED WITH TYPE 2 DIABETES MELLITUS: Chronic | ICD-10-CM

## 2020-08-17 DIAGNOSIS — E11.51 TYPE 2 DIABETES MELLITUS WITH DIABETIC PERIPHERAL ANGIOPATHY WITHOUT GANGRENE, WITHOUT LONG-TERM CURRENT USE OF INSULIN: ICD-10-CM

## 2020-08-17 DIAGNOSIS — E11.51 TYPE 2 DIABETES MELLITUS WITH DIABETIC PERIPHERAL ANGIOPATHY WITHOUT GANGRENE, WITHOUT LONG-TERM CURRENT USE OF INSULIN: Primary | ICD-10-CM

## 2020-08-17 DIAGNOSIS — E78.2 COMBINED HYPERLIPIDEMIA ASSOCIATED WITH TYPE 2 DIABETES MELLITUS: Chronic | ICD-10-CM

## 2020-08-17 LAB
ALBUMIN SERPL BCP-MCNC: 3.9 G/DL (ref 3.5–5.2)
ALP SERPL-CCNC: 66 U/L (ref 55–135)
ALT SERPL W/O P-5'-P-CCNC: 20 U/L (ref 10–44)
ANION GAP SERPL CALC-SCNC: 13 MMOL/L (ref 8–16)
AST SERPL-CCNC: 28 U/L (ref 10–40)
BILIRUB SERPL-MCNC: 0.4 MG/DL (ref 0.1–1)
BUN SERPL-MCNC: 23 MG/DL (ref 8–23)
CALCIUM SERPL-MCNC: 9 MG/DL (ref 8.7–10.5)
CHLORIDE SERPL-SCNC: 110 MMOL/L (ref 95–110)
CHOLEST SERPL-MCNC: 146 MG/DL (ref 120–199)
CHOLEST/HDLC SERPL: 5 {RATIO} (ref 2–5)
CO2 SERPL-SCNC: 24 MMOL/L (ref 23–29)
CREAT SERPL-MCNC: 1.4 MG/DL (ref 0.5–1.4)
EST. GFR  (AFRICAN AMERICAN): 55.6 ML/MIN/1.73 M^2
EST. GFR  (NON AFRICAN AMERICAN): 48.1 ML/MIN/1.73 M^2
GLUCOSE SERPL-MCNC: 66 MG/DL (ref 70–110)
HDLC SERPL-MCNC: 29 MG/DL (ref 40–75)
HDLC SERPL: 19.9 % (ref 20–50)
LDLC SERPL CALC-MCNC: 83.6 MG/DL (ref 63–159)
NONHDLC SERPL-MCNC: 117 MG/DL
POTASSIUM SERPL-SCNC: 4 MMOL/L (ref 3.5–5.1)
PROT SERPL-MCNC: 7 G/DL (ref 6–8.4)
SODIUM SERPL-SCNC: 147 MMOL/L (ref 136–145)
TRIGL SERPL-MCNC: 167 MG/DL (ref 30–150)

## 2020-08-17 PROCEDURE — 1100F PTFALLS ASSESS-DOCD GE2>/YR: CPT | Mod: HCNC,CPTII,S$GLB, | Performed by: INTERNAL MEDICINE

## 2020-08-17 PROCEDURE — 3078F PR MOST RECENT DIASTOLIC BLOOD PRESSURE < 80 MM HG: ICD-10-PCS | Mod: HCNC,CPTII,S$GLB, | Performed by: INTERNAL MEDICINE

## 2020-08-17 PROCEDURE — 83036 HEMOGLOBIN GLYCOSYLATED A1C: CPT | Mod: HCNC

## 2020-08-17 PROCEDURE — 80061 LIPID PANEL: CPT | Mod: HCNC

## 2020-08-17 PROCEDURE — 3078F DIAST BP <80 MM HG: CPT | Mod: HCNC,CPTII,S$GLB, | Performed by: INTERNAL MEDICINE

## 2020-08-17 PROCEDURE — 99499 UNLISTED E&M SERVICE: CPT | Mod: HCNC,S$GLB,, | Performed by: INTERNAL MEDICINE

## 2020-08-17 PROCEDURE — 80053 COMPREHEN METABOLIC PANEL: CPT | Mod: HCNC

## 2020-08-17 PROCEDURE — 3288F PR FALLS RISK ASSESSMENT DOCUMENTED: ICD-10-PCS | Mod: HCNC,CPTII,S$GLB, | Performed by: INTERNAL MEDICINE

## 2020-08-17 PROCEDURE — 1100F PR PT FALLS ASSESS DOC 2+ FALLS/FALL W/INJURY/YR: ICD-10-PCS | Mod: HCNC,CPTII,S$GLB, | Performed by: INTERNAL MEDICINE

## 2020-08-17 PROCEDURE — 1159F PR MEDICATION LIST DOCUMENTED IN MEDICAL RECORD: ICD-10-PCS | Mod: HCNC,S$GLB,, | Performed by: INTERNAL MEDICINE

## 2020-08-17 PROCEDURE — 36415 COLL VENOUS BLD VENIPUNCTURE: CPT | Mod: HCNC,PO

## 2020-08-17 PROCEDURE — 1125F AMNT PAIN NOTED PAIN PRSNT: CPT | Mod: HCNC,S$GLB,, | Performed by: INTERNAL MEDICINE

## 2020-08-17 PROCEDURE — 99499 RISK ADDL DX/OHS AUDIT: ICD-10-PCS | Mod: HCNC,S$GLB,, | Performed by: INTERNAL MEDICINE

## 2020-08-17 PROCEDURE — 3075F SYST BP GE 130 - 139MM HG: CPT | Mod: HCNC,CPTII,S$GLB, | Performed by: INTERNAL MEDICINE

## 2020-08-17 PROCEDURE — 99999 PR PBB SHADOW E&M-EST. PATIENT-LVL V: CPT | Mod: PBBFAC,HCNC,, | Performed by: INTERNAL MEDICINE

## 2020-08-17 PROCEDURE — 1125F PR PAIN SEVERITY QUANTIFIED, PAIN PRESENT: ICD-10-PCS | Mod: HCNC,S$GLB,, | Performed by: INTERNAL MEDICINE

## 2020-08-17 PROCEDURE — 99214 OFFICE O/P EST MOD 30 MIN: CPT | Mod: HCNC,S$GLB,, | Performed by: INTERNAL MEDICINE

## 2020-08-17 PROCEDURE — 82043 UR ALBUMIN QUANTITATIVE: CPT | Mod: HCNC

## 2020-08-17 PROCEDURE — 3075F PR MOST RECENT SYSTOLIC BLOOD PRESS GE 130-139MM HG: ICD-10-PCS | Mod: HCNC,CPTII,S$GLB, | Performed by: INTERNAL MEDICINE

## 2020-08-17 PROCEDURE — 3288F FALL RISK ASSESSMENT DOCD: CPT | Mod: HCNC,CPTII,S$GLB, | Performed by: INTERNAL MEDICINE

## 2020-08-17 PROCEDURE — 99999 PR PBB SHADOW E&M-EST. PATIENT-LVL V: ICD-10-PCS | Mod: PBBFAC,HCNC,, | Performed by: INTERNAL MEDICINE

## 2020-08-17 PROCEDURE — 1159F MED LIST DOCD IN RCRD: CPT | Mod: HCNC,S$GLB,, | Performed by: INTERNAL MEDICINE

## 2020-08-17 PROCEDURE — 99214 PR OFFICE/OUTPT VISIT, EST, LEVL IV, 30-39 MIN: ICD-10-PCS | Mod: HCNC,S$GLB,, | Performed by: INTERNAL MEDICINE

## 2020-08-17 RX ORDER — SILDENAFIL 100 MG/1
100 TABLET, FILM COATED ORAL DAILY PRN
Qty: 30 TABLET | Refills: 2 | Status: SHIPPED | OUTPATIENT
Start: 2020-08-17 | End: 2023-02-16

## 2020-08-17 NOTE — ASSESSMENT & PLAN NOTE
Diabetes continues to do well at this time.  Most recent a1c is     Lab Results   Component Value Date    HGBA1C 5.9 (H) 06/12/2019    .      We will continue the current regimen.  Focus on a low carbohydrate diet and consistent exercise.        Update labs today

## 2020-08-17 NOTE — PROGRESS NOTES
"Subjective:       Patient ID: Srinath Mcknight is a 79 y.o. male.    Chief Complaint: Annual Exam    HPI Patient is a 79-year-old male presenting today following up on his diabetes, coronary artery disease, hypertension, hyperlipidemia.  He indicates he has been doing well.  He has been little while since he has been in to see us.  He has noted no major issues with diabetes.  No problems with his coronary artery disease.  He states he is having no chest pain or palpitations.  No significant hypo or hyperglycemia.  Blood pressure has been well controlled over time.    Review of Systems   Constitutional: Negative for fever and unexpected weight change.   Respiratory: Negative for cough, shortness of breath and wheezing.    Cardiovascular: Negative for chest pain and palpitations.   Gastrointestinal: Negative for constipation, diarrhea, nausea and vomiting.   Genitourinary: Negative for dysuria and hematuria.       Objective:   /78   Pulse 80   Temp 96.6 °F (35.9 °C) (Temporal)   Ht 6' 3" (1.905 m)   Wt 104.5 kg (230 lb 6.1 oz)   BMI 28.80 kg/m²      Physical Exam  Vitals signs reviewed.   Constitutional:       Appearance: He is well-developed.   HENT:      Head: Normocephalic and atraumatic.      Right Ear: Tympanic membrane, ear canal and external ear normal.      Left Ear: Tympanic membrane, ear canal and external ear normal.   Eyes:      Pupils: Pupils are equal, round, and reactive to light.   Neck:      Musculoskeletal: Neck supple.      Thyroid: No thyromegaly.   Cardiovascular:      Rate and Rhythm: Normal rate and regular rhythm.      Heart sounds: Normal heart sounds. No murmur. No friction rub. No gallop.    Pulmonary:      Effort: Pulmonary effort is normal.      Breath sounds: Normal breath sounds. No wheezing or rales.   Abdominal:      General: Bowel sounds are normal. There is no distension.      Palpations: Abdomen is soft.      Tenderness: There is no abdominal tenderness.   Psychiatric:    "      Mood and Affect: Mood normal.             Assessment:       1. Type 2 diabetes mellitus with diabetic peripheral angiopathy without gangrene, without long-term current use of insulin    2. Coronary artery disease, occlusive    3. Essential hypertension    4. Combined hyperlipidemia associated with type 2 diabetes mellitus    5. Encounter for screening for malignant neoplasm of prostate        Plan:   Type 2 diabetes mellitus with diabetic peripheral angiopathy without gangrene, without long-term current use of insulin  Diabetes continues to do well at this time.  Most recent a1c is     Lab Results   Component Value Date    HGBA1C 5.9 (H) 06/12/2019    .      We will continue the current regimen.  Focus on a low carbohydrate diet and consistent exercise.        Update labs today    Essential hypertension  Blood pressure is under good control.  We will continue the current regimen.  Will work on regular aerobic exercise and a low salt diet.        Combined hyperlipidemia associated with type 2 diabetes mellitus  Cholesterol numbers look good.  We will continue the current regimen at this time.  Remain focused on low fat diet and high dietary fiber intake.      Type 2 diabetes mellitus with diabetic peripheral angiopathy without gangrene, without long-term current use of insulin  -     Comprehensive metabolic panel; Future; Expected date: 08/17/2020  -     Hemoglobin A1C; Future; Expected date: 08/17/2020  -     Lipid Panel; Future; Expected date: 08/17/2020  -     Microalbumin/creatinine urine ratio    Coronary artery disease, occlusive    Essential hypertension    Combined hyperlipidemia associated with type 2 diabetes mellitus    Encounter for screening for malignant neoplasm of prostate    Other orders  -     sildenafiL (VIAGRA) 100 MG tablet; Take 1 tablet (100 mg total) by mouth daily as needed for Erectile Dysfunction.  Dispense: 30 tablet; Refill: 2          Follow up in about 6 months (around 2/17/2021)  for DM, HLP, HTN, CAD, with Dori Mckinnon PA-C.

## 2020-08-18 LAB
ALBUMIN/CREAT UR: 4.3 UG/MG (ref 0–30)
CREAT UR-MCNC: 141 MG/DL (ref 23–375)
ESTIMATED AVG GLUCOSE: 111 MG/DL (ref 68–131)
HBA1C MFR BLD HPLC: 5.5 % (ref 4–5.6)
MICROALBUMIN UR DL<=1MG/L-MCNC: 6 UG/ML

## 2020-09-29 ENCOUNTER — PATIENT MESSAGE (OUTPATIENT)
Dept: OTHER | Facility: OTHER | Age: 80
End: 2020-09-29

## 2020-09-29 ENCOUNTER — DOCUMENTATION ONLY (OUTPATIENT)
Dept: PULMONOLOGY | Facility: CLINIC | Age: 80
End: 2020-09-29

## 2020-09-29 PROBLEM — G47.52 CONTROLLED REM SLEEP BEHAVIOR DISORDER: Status: ACTIVE | Noted: 2020-09-29

## 2020-10-02 ENCOUNTER — PES CALL (OUTPATIENT)
Dept: ADMINISTRATIVE | Facility: CLINIC | Age: 80
End: 2020-10-02

## 2020-10-26 ENCOUNTER — PES CALL (OUTPATIENT)
Dept: ADMINISTRATIVE | Facility: CLINIC | Age: 80
End: 2020-10-26

## 2020-11-02 ENCOUNTER — PES CALL (OUTPATIENT)
Dept: ADMINISTRATIVE | Facility: CLINIC | Age: 80
End: 2020-11-02

## 2020-11-03 ENCOUNTER — TELEPHONE (OUTPATIENT)
Dept: RADIOLOGY | Facility: HOSPITAL | Age: 80
End: 2020-11-03

## 2020-11-04 ENCOUNTER — HOSPITAL ENCOUNTER (OUTPATIENT)
Dept: RADIOLOGY | Facility: HOSPITAL | Age: 80
Discharge: HOME | End: 2020-11-04
Attending: UROLOGY
Payer: MEDICARE

## 2020-11-04 DIAGNOSIS — N28.1 RENAL CYST: ICD-10-CM

## 2020-11-04 DIAGNOSIS — N32.81 OAB (OVERACTIVE BLADDER): ICD-10-CM

## 2020-11-04 PROCEDURE — 76770 US EXAM ABDO BACK WALL COMP: CPT | Mod: TC,HCNC

## 2020-11-04 PROCEDURE — 76770 US EXAM ABDO BACK WALL COMP: CPT | Mod: 26,HCNC,, | Performed by: RADIOLOGY

## 2020-11-04 PROCEDURE — 76770 US RETROPERITONEAL COMPLETE: ICD-10-PCS | Mod: 26,HCNC,, | Performed by: RADIOLOGY

## 2020-11-09 ENCOUNTER — OFFICE VISIT (OUTPATIENT)
Dept: UROLOGY | Facility: CLINIC | Age: 80
End: 2020-11-09
Payer: MEDICARE

## 2020-11-09 VITALS
TEMPERATURE: 97 F | DIASTOLIC BLOOD PRESSURE: 76 MMHG | BODY MASS INDEX: 27.76 KG/M2 | WEIGHT: 222.13 LBS | SYSTOLIC BLOOD PRESSURE: 140 MMHG

## 2020-11-09 DIAGNOSIS — N32.81 OAB (OVERACTIVE BLADDER): ICD-10-CM

## 2020-11-09 DIAGNOSIS — Z12.5 PROSTATE CANCER SCREENING: Primary | ICD-10-CM

## 2020-11-09 DIAGNOSIS — I10 HYPERTENSION, UNSPECIFIED TYPE: ICD-10-CM

## 2020-11-09 DIAGNOSIS — N52.9 ERECTILE DYSFUNCTION, UNSPECIFIED ERECTILE DYSFUNCTION TYPE: ICD-10-CM

## 2020-11-09 PROCEDURE — 3078F PR MOST RECENT DIASTOLIC BLOOD PRESSURE < 80 MM HG: ICD-10-PCS | Mod: HCNC,CPTII,S$GLB, | Performed by: UROLOGY

## 2020-11-09 PROCEDURE — 1126F AMNT PAIN NOTED NONE PRSNT: CPT | Mod: HCNC,S$GLB,, | Performed by: UROLOGY

## 2020-11-09 PROCEDURE — 1126F PR PAIN SEVERITY QUANTIFIED, NO PAIN PRESENT: ICD-10-PCS | Mod: HCNC,S$GLB,, | Performed by: UROLOGY

## 2020-11-09 PROCEDURE — 1159F MED LIST DOCD IN RCRD: CPT | Mod: HCNC,S$GLB,, | Performed by: UROLOGY

## 2020-11-09 PROCEDURE — 99214 PR OFFICE/OUTPT VISIT, EST, LEVL IV, 30-39 MIN: ICD-10-PCS | Mod: HCNC,S$GLB,, | Performed by: UROLOGY

## 2020-11-09 PROCEDURE — 1101F PR PT FALLS ASSESS DOC 0-1 FALLS W/OUT INJ PAST YR: ICD-10-PCS | Mod: HCNC,CPTII,S$GLB, | Performed by: UROLOGY

## 2020-11-09 PROCEDURE — 99999 PR PBB SHADOW E&M-EST. PATIENT-LVL IV: CPT | Mod: PBBFAC,HCNC,, | Performed by: UROLOGY

## 2020-11-09 PROCEDURE — 3078F DIAST BP <80 MM HG: CPT | Mod: HCNC,CPTII,S$GLB, | Performed by: UROLOGY

## 2020-11-09 PROCEDURE — 99999 PR PBB SHADOW E&M-EST. PATIENT-LVL IV: ICD-10-PCS | Mod: PBBFAC,HCNC,, | Performed by: UROLOGY

## 2020-11-09 PROCEDURE — 3077F PR MOST RECENT SYSTOLIC BLOOD PRESSURE >= 140 MM HG: ICD-10-PCS | Mod: HCNC,CPTII,S$GLB, | Performed by: UROLOGY

## 2020-11-09 PROCEDURE — 1101F PT FALLS ASSESS-DOCD LE1/YR: CPT | Mod: HCNC,CPTII,S$GLB, | Performed by: UROLOGY

## 2020-11-09 PROCEDURE — 3077F SYST BP >= 140 MM HG: CPT | Mod: HCNC,CPTII,S$GLB, | Performed by: UROLOGY

## 2020-11-09 PROCEDURE — 99214 OFFICE O/P EST MOD 30 MIN: CPT | Mod: HCNC,S$GLB,, | Performed by: UROLOGY

## 2020-11-09 PROCEDURE — 1159F PR MEDICATION LIST DOCUMENTED IN MEDICAL RECORD: ICD-10-PCS | Mod: HCNC,S$GLB,, | Performed by: UROLOGY

## 2020-11-09 RX ORDER — PAPAVERINE HYDROCHLORIDE 30 MG/ML
INJECTION INTRAMUSCULAR; INTRAVENOUS
Qty: 10 ML | Refills: 5 | Status: SHIPPED | OUTPATIENT
Start: 2020-11-09 | End: 2022-11-09 | Stop reason: SDUPTHER

## 2020-11-09 NOTE — PROGRESS NOTES
Chief Complaint: Hematuria    HPI:   11/9/20: Sweating a lot doesn't know why.  Has trouble with ED not lasting long enough. Sildenafil not working well enough at 100mg.   Indep assessment of imaging agree with report:  US reassuring. OAB seems better. Reviewed history in detail.   11/6/19: Constipation improved with BM qod or better.  Voiding better also.  No hematuria today.  Nocturia x1.  10/3/19: Had back surgery 8/20/19 and it is a lot better but did have a period of retention with a álvarez and then some OAB/nocturia from UTI; resolved on abx and then 3 wks symptoms recurred.  OAB/nocturia today, sudden urgency.  Foot powder made things getter.  Back on flomax since his troubles started.  PVR 70ml.  Constipation a problem BM q3d sometimes.  Miralax not improving.  No OTC benadryl.  12/10/18: US redemonstrates a 1.4 cm right renal cyst otherwise no adverse findings.  No more gross hematuria.  Says when he took flomax he voided too much and he stopped it.   for a few weeks and his wife complains of a brown discharge and dysuria.  Tinea cruris complaints.  12/4/17: CT Urogram reassuring.  Cysto today normal.  11/13/17: 77 yo man this summer saw gross hematuria; it stopped after a few days.  Has had sudden urgency just since that time.  No abd/pelvic pain and no exac/rel factors.  No other hematuria.  No urolithiasis.  No urinary bother.  Had urgency/incontinence and saw a urologist for that 2+ years ago and it got better.  US 8/17 showed no stones.    Allergies:  Sulfa (sulfonamide antibiotics)    Medications:  has a current medication list which includes the following prescription(s): accu-chek irving plus test strp, accu-chek softclix lancets, allopurinol, blood-glucose meter, cyanocobalamin (vitamin b-12), docusate sodium, ferrous sulfate, gabapentin, garlic extract, glipizide, losartan, metformin, metoprolol succinate, multivitamin, pravastatin, sildenafil, aspirin, bisacodyl, clonazepam, finasteride,  melatonin, methocarbamol, tadalafil, and tamsulosin.    Review of Systems:  General: No fever, chills, fatigability, or weight loss.  Skin: No rashes, itching, or changes in color or texture of skin.  Chest: Denies DAMON, cyanosis, wheezing, cough, and sputum production.  Abdomen: Appetite fine. No weight loss. Denies diarrhea, abdominal pain, hematemesis, or blood in stool.  Musculoskeletal: No joint stiffness or swelling. Denies back pain.  : As above.  All other review of systems negative.    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    PSH:   has a past surgical history that includes Rotator cuff repair (Left, 2006); Shoulder arthroscopy w/ rotator cuff repair (Right, 2009); Laminectomy (07/22/2016); Hernia repair (Bilateral, 04/18/2017); Joint replacement (Left, 10/09/2017); Back surgery; lumbar foraminotomy (03/2018); left elbow (10/2017); Revision total hip arthroplasty (Left, 9/11/2018); Esophagogastroduodenoscopy (N/A, 6/30/2020); and Colonoscopy (N/A, 6/30/2020).    FamHx: family history includes Cancer (age of onset: 50) in his brother; Diabetes in his father, mother, and sister; Drug abuse in his brother; Heart disease in his father and mother; Hypertension in his father and mother; Stroke in his father.    SocHx:  reports that he has never smoked. He has never used smokeless tobacco. He reports current alcohol use of about 1.0 standard drinks of alcohol per week. He  reports that he does not use drugs.      Physical Exam:  Vitals:    11/09/20 0946   BP: (!) 140/76   Temp: 96.8 °F (36 °C)     General: A&Ox3, no apparent distress, no deformities  Neck: No masses, normal thyroid  Lungs: normal inspiration, no use of accessory muscles  Heart: normal pulse, no arrhythmias  Abdomen: Soft, NT, ND  Skin: The skin is warm and dry. No jaundice.  Ext: No c/c/e.  :   12/17: Test desc digna, no abnormalities of epididymus. Penis normal, with normal penile and scrotal skin. Meatus normal. Normal rectal tone, no hemorrhoids. Prost 30 gm no nodules or masses appreciated. SV not palpable. Perineum and anus normal.    Labs/Studies:   Urinalysis performed in clinic, summary: UA normal  PSA    3/17: 0.53    11/18: 0.6    11/20: 0.62    Impression/Plan:   1. OAB was his main complaint but much better with constipation better.  2. Low risk of prostate cancer: PSA  3. US/RTC 1 year.  4. HTN: discussed, takes his meds  5. ED: trial of trimix

## 2020-11-18 ENCOUNTER — OFFICE VISIT (OUTPATIENT)
Dept: CARDIOLOGY | Facility: CLINIC | Age: 80
End: 2020-11-18
Payer: MEDICARE

## 2020-11-18 ENCOUNTER — OFFICE VISIT (OUTPATIENT)
Dept: INTERNAL MEDICINE | Facility: CLINIC | Age: 80
End: 2020-11-18
Payer: MEDICARE

## 2020-11-18 VITALS
WEIGHT: 221.13 LBS | DIASTOLIC BLOOD PRESSURE: 64 MMHG | BODY MASS INDEX: 27.49 KG/M2 | HEART RATE: 72 BPM | HEIGHT: 75 IN | SYSTOLIC BLOOD PRESSURE: 122 MMHG | OXYGEN SATURATION: 98 %

## 2020-11-18 VITALS
DIASTOLIC BLOOD PRESSURE: 72 MMHG | WEIGHT: 222.25 LBS | HEIGHT: 75 IN | HEART RATE: 87 BPM | BODY MASS INDEX: 27.63 KG/M2 | OXYGEN SATURATION: 100 % | SYSTOLIC BLOOD PRESSURE: 114 MMHG

## 2020-11-18 DIAGNOSIS — E78.5 HYPERLIPIDEMIA ASSOCIATED WITH TYPE 2 DIABETES MELLITUS: ICD-10-CM

## 2020-11-18 DIAGNOSIS — Z99.89 DEPENDENCE ON OTHER ENABLING MACHINES AND DEVICES: ICD-10-CM

## 2020-11-18 DIAGNOSIS — E11.69 COMBINED HYPERLIPIDEMIA ASSOCIATED WITH TYPE 2 DIABETES MELLITUS: Chronic | ICD-10-CM

## 2020-11-18 DIAGNOSIS — N18.30 DIABETES MELLITUS WITH STAGE 3 CHRONIC KIDNEY DISEASE: ICD-10-CM

## 2020-11-18 DIAGNOSIS — E11.22 DIABETES MELLITUS WITH STAGE 3 CHRONIC KIDNEY DISEASE: ICD-10-CM

## 2020-11-18 DIAGNOSIS — E78.2 COMBINED HYPERLIPIDEMIA ASSOCIATED WITH TYPE 2 DIABETES MELLITUS: Chronic | ICD-10-CM

## 2020-11-18 DIAGNOSIS — R20.2 NUMBNESS AND TINGLING: ICD-10-CM

## 2020-11-18 DIAGNOSIS — G47.33 OSA (OBSTRUCTIVE SLEEP APNEA): ICD-10-CM

## 2020-11-18 DIAGNOSIS — R20.0 NUMBNESS AND TINGLING: ICD-10-CM

## 2020-11-18 DIAGNOSIS — M79.606 PAIN OF LOWER EXTREMITY, UNSPECIFIED LATERALITY: ICD-10-CM

## 2020-11-18 DIAGNOSIS — D64.9 ANEMIA, UNSPECIFIED TYPE: ICD-10-CM

## 2020-11-18 DIAGNOSIS — Z00.00 ENCOUNTER FOR PREVENTIVE HEALTH EXAMINATION: Primary | ICD-10-CM

## 2020-11-18 DIAGNOSIS — I10 ESSENTIAL HYPERTENSION: Chronic | ICD-10-CM

## 2020-11-18 DIAGNOSIS — I25.10 CORONARY ARTERY DISEASE, OCCLUSIVE: Primary | ICD-10-CM

## 2020-11-18 DIAGNOSIS — I25.10 CORONARY ARTERY DISEASE, ANGINA PRESENCE UNSPECIFIED, UNSPECIFIED VESSEL OR LESION TYPE, UNSPECIFIED WHETHER NATIVE OR TRANSPLANTED HEART: ICD-10-CM

## 2020-11-18 DIAGNOSIS — R26.9 ABNORMALITY OF GAIT AND MOBILITY: ICD-10-CM

## 2020-11-18 DIAGNOSIS — I70.0 ATHEROSCLEROSIS OF AORTA: ICD-10-CM

## 2020-11-18 DIAGNOSIS — E11.49 TYPE II DIABETES MELLITUS WITH NEUROLOGICAL MANIFESTATIONS: ICD-10-CM

## 2020-11-18 DIAGNOSIS — E11.69 HYPERLIPIDEMIA ASSOCIATED WITH TYPE 2 DIABETES MELLITUS: ICD-10-CM

## 2020-11-18 DIAGNOSIS — Z74.09 OTHER REDUCED MOBILITY: ICD-10-CM

## 2020-11-18 DIAGNOSIS — D57.3 SICKLE CELL TRAIT: ICD-10-CM

## 2020-11-18 PROCEDURE — 99999 PR PBB SHADOW E&M-EST. PATIENT-LVL V: CPT | Mod: PBBFAC,HCNC,, | Performed by: NURSE PRACTITIONER

## 2020-11-18 PROCEDURE — 3074F PR MOST RECENT SYSTOLIC BLOOD PRESSURE < 130 MM HG: ICD-10-PCS | Mod: HCNC,CPTII,S$GLB, | Performed by: NURSE PRACTITIONER

## 2020-11-18 PROCEDURE — 99499 RISK ADDL DX/OHS AUDIT: ICD-10-PCS | Mod: S$GLB,,, | Performed by: NUCLEAR MEDICINE

## 2020-11-18 PROCEDURE — 3288F FALL RISK ASSESSMENT DOCD: CPT | Mod: HCNC,CPTII,S$GLB, | Performed by: NURSE PRACTITIONER

## 2020-11-18 PROCEDURE — 3072F LOW RISK FOR RETINOPATHY: CPT | Mod: HCNC,S$GLB,, | Performed by: NURSE PRACTITIONER

## 2020-11-18 PROCEDURE — 99999 PR PBB SHADOW E&M-EST. PATIENT-LVL IV: ICD-10-PCS | Mod: PBBFAC,HCNC,, | Performed by: NUCLEAR MEDICINE

## 2020-11-18 PROCEDURE — 99215 PR OFFICE/OUTPT VISIT, EST, LEVL V, 40-54 MIN: ICD-10-PCS | Mod: HCNC,S$GLB,, | Performed by: NUCLEAR MEDICINE

## 2020-11-18 PROCEDURE — 99499 RISK ADDL DX/OHS AUDIT: ICD-10-PCS | Mod: S$GLB,,, | Performed by: NURSE PRACTITIONER

## 2020-11-18 PROCEDURE — 1159F PR MEDICATION LIST DOCUMENTED IN MEDICAL RECORD: ICD-10-PCS | Mod: HCNC,S$GLB,, | Performed by: NUCLEAR MEDICINE

## 2020-11-18 PROCEDURE — G0439 PPPS, SUBSEQ VISIT: HCPCS | Mod: HCNC,S$GLB,, | Performed by: NURSE PRACTITIONER

## 2020-11-18 PROCEDURE — 99999 PR PBB SHADOW E&M-EST. PATIENT-LVL IV: CPT | Mod: PBBFAC,HCNC,, | Performed by: NUCLEAR MEDICINE

## 2020-11-18 PROCEDURE — 99999 PR PBB SHADOW E&M-EST. PATIENT-LVL V: ICD-10-PCS | Mod: PBBFAC,HCNC,, | Performed by: NURSE PRACTITIONER

## 2020-11-18 PROCEDURE — 1101F PR PT FALLS ASSESS DOC 0-1 FALLS W/OUT INJ PAST YR: ICD-10-PCS | Mod: HCNC,CPTII,S$GLB, | Performed by: NURSE PRACTITIONER

## 2020-11-18 PROCEDURE — 1101F PT FALLS ASSESS-DOCD LE1/YR: CPT | Mod: HCNC,CPTII,S$GLB, | Performed by: NURSE PRACTITIONER

## 2020-11-18 PROCEDURE — 3072F PR LOW RISK FOR RETINOPATHY: ICD-10-PCS | Mod: HCNC,S$GLB,, | Performed by: NURSE PRACTITIONER

## 2020-11-18 PROCEDURE — 3074F SYST BP LT 130 MM HG: CPT | Mod: HCNC,CPTII,S$GLB, | Performed by: NURSE PRACTITIONER

## 2020-11-18 PROCEDURE — 3074F PR MOST RECENT SYSTOLIC BLOOD PRESSURE < 130 MM HG: ICD-10-PCS | Mod: HCNC,CPTII,S$GLB, | Performed by: NUCLEAR MEDICINE

## 2020-11-18 PROCEDURE — 3072F PR LOW RISK FOR RETINOPATHY: ICD-10-PCS | Mod: HCNC,S$GLB,, | Performed by: NUCLEAR MEDICINE

## 2020-11-18 PROCEDURE — 1125F PR PAIN SEVERITY QUANTIFIED, PAIN PRESENT: ICD-10-PCS | Mod: HCNC,S$GLB,, | Performed by: NURSE PRACTITIONER

## 2020-11-18 PROCEDURE — 1159F MED LIST DOCD IN RCRD: CPT | Mod: HCNC,S$GLB,, | Performed by: NUCLEAR MEDICINE

## 2020-11-18 PROCEDURE — 3078F DIAST BP <80 MM HG: CPT | Mod: HCNC,CPTII,S$GLB, | Performed by: NUCLEAR MEDICINE

## 2020-11-18 PROCEDURE — 3078F DIAST BP <80 MM HG: CPT | Mod: HCNC,CPTII,S$GLB, | Performed by: NURSE PRACTITIONER

## 2020-11-18 PROCEDURE — 3288F PR FALLS RISK ASSESSMENT DOCUMENTED: ICD-10-PCS | Mod: HCNC,CPTII,S$GLB, | Performed by: NURSE PRACTITIONER

## 2020-11-18 PROCEDURE — 99499 UNLISTED E&M SERVICE: CPT | Mod: S$GLB,,, | Performed by: NURSE PRACTITIONER

## 2020-11-18 PROCEDURE — 3078F PR MOST RECENT DIASTOLIC BLOOD PRESSURE < 80 MM HG: ICD-10-PCS | Mod: HCNC,CPTII,S$GLB, | Performed by: NURSE PRACTITIONER

## 2020-11-18 PROCEDURE — 99499 UNLISTED E&M SERVICE: CPT | Mod: S$GLB,,, | Performed by: NUCLEAR MEDICINE

## 2020-11-18 PROCEDURE — 1125F AMNT PAIN NOTED PAIN PRSNT: CPT | Mod: HCNC,S$GLB,, | Performed by: NURSE PRACTITIONER

## 2020-11-18 PROCEDURE — 3072F LOW RISK FOR RETINOPATHY: CPT | Mod: HCNC,S$GLB,, | Performed by: NUCLEAR MEDICINE

## 2020-11-18 PROCEDURE — 99215 OFFICE O/P EST HI 40 MIN: CPT | Mod: HCNC,S$GLB,, | Performed by: NUCLEAR MEDICINE

## 2020-11-18 PROCEDURE — 3074F SYST BP LT 130 MM HG: CPT | Mod: HCNC,CPTII,S$GLB, | Performed by: NUCLEAR MEDICINE

## 2020-11-18 PROCEDURE — G0439 PR MEDICARE ANNUAL WELLNESS SUBSEQUENT VISIT: ICD-10-PCS | Mod: HCNC,S$GLB,, | Performed by: NURSE PRACTITIONER

## 2020-11-18 PROCEDURE — 3078F PR MOST RECENT DIASTOLIC BLOOD PRESSURE < 80 MM HG: ICD-10-PCS | Mod: HCNC,CPTII,S$GLB, | Performed by: NUCLEAR MEDICINE

## 2020-11-18 RX ORDER — LOSARTAN POTASSIUM 50 MG/1
50 TABLET ORAL 2 TIMES DAILY
Qty: 90 TABLET | Refills: 4
Start: 2020-11-18 | End: 2020-11-19

## 2020-11-18 NOTE — PROGRESS NOTES
Subjective:   Patient ID:  Srinath Mcknight is a 79 y.o. male who presents for follow-up of Coronary Artery Disease (ANGINA FC 2), Hypertension (ESSENTIAL), and Hyperlipidemia (WITH DM T2)      HPI FOR LAST 2-4 WEEKS HAS NOTICED INCREASE FATIGUE AND DAMON WITH MODERATE EXERTION- WALKING  NO DYSPNEA AT REST . NO ORTHOPNEA OR PND  NO CHEST DISCOMFORT- ANGINAL OR EQUIVALENT  NO RECURRENT PALPITATIONS  NO ABDOMINAL DISCOMFORT  NO EDEMA. NO CALVE TENDERNESS  NO FOCAL CNS SYMPTOMS OR SIGNS TO SUGGEST TIA OR STROKE  CARD MED- GOOD COMPLIANCE    Review of Systems   Constitution: Positive for malaise/fatigue. Negative for chills, fever, night sweats, weight gain and weight loss.   HENT: Negative for nosebleeds.    Eyes: Negative for blurred vision, double vision and visual disturbance.   Cardiovascular: Positive for dyspnea on exertion. Negative for chest pain, irregular heartbeat, leg swelling, orthopnea, palpitations, paroxysmal nocturnal dyspnea and syncope.   Respiratory: Negative for cough, hemoptysis and wheezing.    Endocrine: Negative for polydipsia and polyuria.   Hematologic/Lymphatic: Does not bruise/bleed easily.   Skin: Negative for rash.   Musculoskeletal: Negative for joint pain, joint swelling, muscle weakness and myalgias.   Gastrointestinal: Negative for abdominal pain, hematemesis, jaundice and melena.   Genitourinary: Negative for dysuria, hematuria and nocturia.   Neurological: Negative for dizziness, focal weakness, headaches, sensory change and weakness.   Psychiatric/Behavioral: Negative for depression. The patient does not have insomnia and is not nervous/anxious.      Family History   Problem Relation Age of Onset    Hypertension Mother     Heart disease Mother     Diabetes Mother     Hypertension Father     Heart disease Father     Diabetes Father     Stroke Father     Diabetes Sister     Cancer Brother 50        pancreas    Drug abuse Brother     Prostate cancer Neg Hx     Macular  degeneration Neg Hx     Retinal detachment Neg Hx     Strabismus Neg Hx     Glaucoma Neg Hx     Blindness Neg Hx     Amblyopia Neg Hx     Kidney disease Neg Hx     Mental illness Neg Hx     Mental retardation Neg Hx     COPD Neg Hx     Asthma Neg Hx      Past Medical History:   Diagnosis Date    Anemia due to unknown mechanism 11/10/2015    Angina pectoris 2014    not since    Arthritis     Back pain     Coronary artery disease     DM (diabetes mellitus) 25-30 years     am 05/15/2019    Encounter for blood transfusion     Gout 12/05/2017    right foot     History of blood transfusion     Hyperlipemia     Hypertension     CHARLES (obstructive sleep apnea)     Polyneuropathy     Spinal cord disease     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    Trouble in sleeping     Type 2 diabetes mellitus with diabetic polyneuropathy, without long-term current use of insulin     Urinary incontinence     Uses hearing aid     bilat     Social History     Socioeconomic History    Marital status:      Spouse name: Not on file    Number of children: 0    Years of education: Not on file    Highest education level: Not on file   Occupational History    Occupation: retired     Comment: teacher   Social Needs    Financial resource strain: Not on file    Food insecurity     Worry: Not on file     Inability: Not on file    Transportation needs     Medical: Not on file     Non-medical: Not on file   Tobacco Use    Smoking status: Never Smoker    Smokeless tobacco: Never Used   Substance and Sexual Activity    Alcohol use: Yes     Alcohol/week: 1.0 standard drinks     Types: 1 Glasses of wine per week     Comment: rarely  No alcohol prior to surgery    Drug use: No    Sexual activity: Yes     Partners: Female     Birth control/protection: Condom   Lifestyle    Physical activity     Days per week: Not on file     Minutes per session: Not on file    Stress: Not at all    Relationships    Social connections     Talks on phone: Not on file     Gets together: Not on file     Attends Church service: Not on file     Active member of club or organization: Not on file     Attends meetings of clubs or organizations: Not on file     Relationship status: Not on file   Other Topics Concern    Not on file   Social History Narrative    Single lives alone. No children. Retired but some part time work - 4 hours a day. 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 Caffeine intake - sugar free cola, Rare coffee intake. Still drives. Does have a Living Will . Has a nephew Jt Gayle, lives in [place]. Has a friend Karina Rossi, locally who could help him.      Current Outpatient Medications on File Prior to Visit   Medication Sig Dispense Refill    allopurinoL (ZYLOPRIM) 100 MG tablet TAKE 1 TABLET EVERY DAY 90 tablet 3    aspirin (ECOTRIN) 81 MG EC tablet Take 1 tablet (81 mg total) by mouth once daily.  0    cyanocobalamin, vitamin B-12, 1,000 mcg Subl Place 1,000 mcg under the tongue once daily. 90 tablet 3    ferrous sulfate 324 mg (65 mg iron) TbEC Take 1 tablet (324 mg total) by mouth once daily.      gabapentin (NEURONTIN) 300 MG capsule Take 300 mg by mouth 3 (three) times daily.      glipiZIDE (GLUCOTROL) 5 MG tablet Take 2 tablets (10 mg total) by mouth 2 (two) times daily with meals. 360 tablet 4    losartan (COZAAR) 50 MG tablet Take 1 tablet (50 mg total) by mouth once daily. 90 tablet 4    metFORMIN (GLUCOPHAGE) 1000 MG tablet TAKE 1 TABLET TWICE DAILY WITH MEALS 180 tablet 3    metoprolol succinate (TOPROL-XL) 50 MG 24 hr tablet TAKE 1 TABLET EVERY DAY 90 tablet 3    multivitamin (ONE DAILY MULTIVITAMIN) per tablet Take 1 tablet by mouth once daily.      pravastatin (PRAVACHOL) 40 MG tablet TAKE 1 TABLET EVERY DAY 90 tablet 3    sildenafiL (VIAGRA) 100 MG tablet Take 1 tablet (100 mg total) by mouth daily as needed for Erectile Dysfunction. 30 tablet 2    tadalafil (CIALIS)  "5 MG tablet Take 1 tablet (5 mg total) by mouth daily as needed for Erectile Dysfunction. 30 tablet 0    ACCU-CHEK FRANKO PLUS TEST STRP Strp TEST BLOOD SUGAR EVERY  strip 0    ACCU-CHEK SOFTCLIX LANCETS Misc TEST ONE TIME DAILY 100 each 11    bisacodyl (DULCOLAX) 10 mg Supp   0    blood-glucose meter (ACCU-CHEK FRANKO PLUS METER) Misc 1 Device by Misc.(Non-Drug; Combo Route) route once daily. 1 each 0    docusate sodium (COLACE) 100 MG capsule Take 1 capsule (100 mg total) by mouth 2 (two) times daily as needed. (Patient taking differently: Take 100 mg by mouth 2 (two) times daily. Per [facility]) 60 capsule 0    finasteride (PROSCAR) 5 mg tablet Take 1 tablet (5 mg total) by mouth once daily. (Patient not taking: Reported on 11/18/2020) 30 tablet 6    GARLIC EXTRACT ORAL Take 1 tablet by mouth once daily. Per [facility]      MELATONIN ORAL Take by mouth every evening.      papaverine 30 mg/mL injection Inject 0.3 ml of trimix solution prn ED max once daily  Prepare 10ml of trimix solution containing:    Papaverine 30mg/ml    Phentolamine 1 mg/ml    Alprostadil 10mcg/ml  Dispense 10ml per refill  Qs syringes 1cc/30g/0.5" and alcohol swabs dispense as needed for Intracavernosal injection 10 mL 5    [DISCONTINUED] clonazePAM (KLONOPIN) 0.5 MG disintegrating tablet DISSOLVE ONE TABLET BY MOUTH EVERY NIGHT AT BEDTIME (Patient not taking: Reported on 11/9/2020) 90 tablet 0    [DISCONTINUED] methocarbamol (ROBAXIN) 500 MG Tab One or 2 tablets q 8 hours as needed for muscle/neck tightness/stiffness. (Patient not taking: Reported on 11/9/2020) 40 tablet 0    [DISCONTINUED] tamsulosin (FLOMAX) 0.4 mg Cap TAKE 1 CAPSULE (0.4 MG TOTAL) BY MOUTH ONCE DAILY. (Patient not taking: Reported on 11/18/2020) 90 capsule 3     No current facility-administered medications on file prior to visit.      Review of patient's allergies indicates:   Allergen Reactions    Sulfa (sulfonamide antibiotics)      " Can't recall from 1995       Objective:     Physical Exam   Constitutional: He is oriented to person, place, and time. He appears well-developed. No distress.   HENT:   Head: Normocephalic.   Eyes: Pupils are equal, round, and reactive to light. Conjunctivae are normal.   Neck: Neck supple. No JVD present. No thyromegaly present.   Cardiovascular: Normal rate, regular rhythm, normal heart sounds and intact distal pulses. Exam reveals no gallop and no friction rub.   No murmur heard.  Pulses:       Carotid pulses are 2+ on the right side and 2+ on the left side.       Radial pulses are 2+ on the right side and 2+ on the left side.        Femoral pulses are 2+ on the right side and 2+ on the left side.       Popliteal pulses are 2+ on the right side and 2+ on the left side.        Dorsalis pedis pulses are 2+ on the right side and 2+ on the left side.        Posterior tibial pulses are 2+ on the right side and 2+ on the left side.   Pulmonary/Chest: Breath sounds normal. He has no wheezes. He has no rales. He exhibits no tenderness.   Abdominal: Soft. Bowel sounds are normal. He exhibits no mass. There is no hepatosplenomegaly. There is no abdominal tenderness.   Musculoskeletal:         General: No tenderness or edema.      Cervical back: Normal.      Thoracic back: Normal.      Lumbar back: Normal.   Lymphadenopathy:     He has no cervical adenopathy.     He has no axillary adenopathy.        Right: No supraclavicular adenopathy present.        Left: No supraclavicular adenopathy present.   Neurological: He is alert and oriented to person, place, and time. He has normal strength. No sensory deficit. Gait normal.   Skin: Skin is warm. No cyanosis. No pallor. Nails show no clubbing.   Psychiatric: He has a normal mood and affect. His speech is normal and behavior is normal. Cognition and memory are normal.       Assessment:     1. Coronary artery disease, occlusive    2. Essential hypertension    3. Combined  hyperlipidemia associated with type 2 diabetes mellitus        Plan:     Coronary artery disease, occlusive  NO CLINICAL EVIDENCE OF ACTIVE MYOCARDIAL ISCHEMIA  NO ARRHYTHMIAS    Essential hypertension  BP CONTROLLED  NO ADHF  CNS STATUS STABLE    Combined hyperlipidemia associated with type 2 diabetes mellitus  STATIN WELL TOLERATED    SYMPTOMS COULD REPRESENT WORSENING OF DD.  SECONDARY TO HTN, CAD AND DM    PLAN- OMT [facility]-  INCREASE LOSARTAN TO 50 MG BI    DC- GLYPIZIDE ,  AND START  SGLT2i-  EMPAGLIFLOXIN  10 MG  DAILY    RETURN IN 4 WEEKS.

## 2020-11-18 NOTE — PATIENT INSTRUCTIONS
Counseling and Referral of Other Preventative  (Italic type indicates deductible and co-insurance are waived)    Patient Name: Srinath Mcknight  Today's Date: 11/18/2020    Health Maintenance       Date Due Completion Date    Shingles Vaccine (1 of 2) 11/21/1992 ---    Foot Exam 11/12/2020 11/12/2019    Hemoglobin A1c 02/17/2021 8/17/2020    Eye Exam 05/29/2021 5/29/2020    Override on 12/22/2015: Done    Lipid Panel 08/17/2021 8/17/2020    PROSTATE-SPECIFIC ANTIGEN 11/04/2021 11/4/2020    Aspirin/Antiplatelet Therapy 11/18/2021 11/18/2020    Colonoscopy 06/30/2025 6/30/2020    TETANUS VACCINE 05/15/2028 5/15/2018        No orders of the defined types were placed in this encounter.    The following information is provided to all patients.  This information is to help you find resources for any of the problems found today that may be affecting your health:                Living healthy guide: [email or web address].[email or web address]      Understanding Diabetes: www.diabetes.org      Eating healthy: www.cdc.gov/healthyweight      [facility] home safety checklist: www.cdc.gov/steadi/patient.html      Agency on Aging: www.goea.[place].gov      Alcoholics anonymous (AA): www.aa.org      Physical Activity: www.michael.nih.gov/er1zotq      Tobacco use: www.quitwithusla.org

## 2020-11-18 NOTE — PROGRESS NOTES
"  Srinath Mcknight presented for a  Medicare AWV and comprehensive Health Risk Assessment today. The following components were reviewed and updated:    · Medical history  · Family History  · Social history  · Allergies and Current Medications  · Health Risk Assessment  · Health Maintenance  · Care Team     ** See Completed Assessments for Annual Wellness Visit within the encounter summary.**         The following assessments were completed:  · Living Situation  · CAGE  · Depression Screening  · Timed Get Up and Go  · Whisper Test-na  · Cognitive Function Screening  · Nutrition Screening  · ADL Screening  · PAQ Screening        Vitals:    11/18/20 1107   BP: 122/64   BP Location: Right arm   Patient Position: Sitting   Pulse: 72   SpO2: 98%   Weight: 100.3 kg (221 lb 1.9 oz)   Height: 6' 3" (1.905 m)     Body mass index is 27.64 kg/m².  Physical Exam  Vitals signs and nursing note reviewed.   Constitutional:       Appearance: He is well-developed.   HENT:      Head: Normocephalic.   Cardiovascular:      Rate and Rhythm: Normal rate and regular rhythm.      Pulses:           Dorsalis pedis pulses are 2+ on the right side and 2+ on the left side.      Heart sounds: Normal heart sounds. No murmur.   Pulmonary:      Effort: Pulmonary effort is normal. No respiratory distress.      Breath sounds: Normal breath sounds.   Abdominal:      Palpations: Abdomen is soft. There is no mass.      Tenderness: There is no abdominal tenderness.   Musculoskeletal: Normal range of motion.   Skin:     General: Skin is warm and dry.   Neurological:      Mental Status: He is alert and oriented to person, place, and time.      Motor: No abnormal muscle tone.   Psychiatric:         Speech: Speech normal.         Behavior: Behavior normal.               Diagnoses and health risks identified today and associated recommendations/orders:    1. Encounter for preventive health examination  Patient will [facility] Shingrix at [facility].      MA to " schedule  Podiatry  Pulmonary    Reports has an advanced directive/living will and will bring into next OV, so that can be placed in chart.      2. Coronary artery disease, angina presence unspecified, unspecified vessel or lesion type, unspecified whether native or transplanted heart  Continue current treatment plan as previously prescribed with your cardiologist.     3. Essential hypertension  Stable and controlled. Continue current treatment plan as previously prescribed with your PCP and cardiologist.     4. Hyperlipidemia associated with type 2 diabetes mellitus  A1C 5.5   Continue current treatment plan as previously prescribed with your PCP and cardiologist.   - Ankle Brachial Indices (KAMALJIT); Future    5. Pain of lower extremity, unspecified laterality  Reports bilateral leg pain on exertion, ongoing for about a year or two, denies worsening.    Advised to follow up with PCP for further evaluation and treatment. He expressed understanding.    - Ankle Brachial Indices (KAMALJIT); Future    6. Diabetes mellitus with stage 3 chronic kidney disease  Stable. Continue current treatment plan as previously prescribed with your PCP.     7. Type II diabetes mellitus with neurological manifestations  Stable. Continue current treatment plan as previously prescribed with your PCP and podiatrist.    8. Atherosclerosis of aorta  Ct 11/2017  Continue current treatment plan as previously prescribed with your cardiologist.   - Ankle Brachial Indices (KAMALJIT); Future    9. Anemia, unspecified type  Continue current treatment plan as previously prescribed with your hematologist.     10. Sickle cell trait  Reports diagnosed in his 20s outside of [facility].   Advised to follow up with hematology.He expressed understanding.      11. CHARLES (obstructive sleep apnea)  Continue current treatment plan as previously prescribed with your pulmonologist.     12. Numbness and tingling  Right hand, chronic, denies worsening.   Advised to follow up with  PCP for further evaluation and treatment. He expressed understanding.      13. Dependence on other enabling machines and devices  Abnormal timed get up and go test. Denies any falls in the last 12 months. Uses a cane to aid with ambulation.   Fall precautions reviewed with patient. Advised to follow up with PCP for further recommendations. Patient expressed understanding.       14. Abnormality of gait and mobility  See #13    15. Other reduced mobility  See #13      Provided [place] with a 5-10 year written screening schedule and personal prevention plan. Recommendations were developed using the USPSTF age appropriate recommendations. Education, counseling, and referrals were provided as needed. After Visit Summary printed and given to patient which includes a list of additional screenings\tests needed.    Follow up in about 1 year (around 11/18/2021) for awv.    Sandra Cook NP

## 2020-11-18 NOTE — Clinical Note
Your patient was seen today for AWV. Abnormalities bolded. Please review.   Thank you,   ZEINAB Marinelli

## 2020-11-25 ENCOUNTER — TELEPHONE (OUTPATIENT)
Dept: CARDIOLOGY | Facility: CLINIC | Age: 80
End: 2020-11-25

## 2020-11-25 RX ORDER — LOSARTAN POTASSIUM 50 MG/1
50 TABLET ORAL 2 TIMES DAILY
COMMUNITY
End: 2021-01-25 | Stop reason: SDUPTHER

## 2020-11-25 NOTE — TELEPHONE ENCOUNTER
Called and spoke with pt. Pt informed doing well. Denies CP, SOB or dizziness. Informed tolerating new medications: jardiance 10 mg daily and increased dose of losartan 50 mg BID. Reminded of f/u appt on Wednesday, 12/23/2020 at 9:30 with Dr. Cobos.

## 2020-11-25 NOTE — TELEPHONE ENCOUNTER
----- Message from Gudelia Guardado LPN sent at 11/18/2020  9:33 AM CST -----  Started jardiance. Check progress

## 2020-12-04 ENCOUNTER — HOSPITAL ENCOUNTER (OUTPATIENT)
Dept: CARDIOLOGY | Facility: HOSPITAL | Age: 80
Discharge: HOME | End: 2020-12-04
Attending: NURSE PRACTITIONER
Payer: MEDICARE

## 2020-12-04 ENCOUNTER — OFFICE VISIT (OUTPATIENT)
Dept: PODIATRY | Facility: CLINIC | Age: 80
End: 2020-12-04
Payer: MEDICARE

## 2020-12-04 ENCOUNTER — CLINICAL SUPPORT (OUTPATIENT)
Dept: URGENT CARE | Facility: CLINIC | Age: 80
End: 2020-12-04
Payer: MEDICARE

## 2020-12-04 VITALS
HEIGHT: 75 IN | DIASTOLIC BLOOD PRESSURE: 68 MMHG | BODY MASS INDEX: 27.14 KG/M2 | WEIGHT: 218.25 LBS | SYSTOLIC BLOOD PRESSURE: 116 MMHG | HEART RATE: 83 BPM

## 2020-12-04 VITALS — BODY MASS INDEX: 27.48 KG/M2 | WEIGHT: 221 LBS | HEIGHT: 75 IN

## 2020-12-04 DIAGNOSIS — I87.2 VENOUS INSUFFICIENCY (CHRONIC) (PERIPHERAL): ICD-10-CM

## 2020-12-04 DIAGNOSIS — M19.072 OSTEOARTHRITIS OF LEFT ANKLE OR FOOT: ICD-10-CM

## 2020-12-04 DIAGNOSIS — E11.69 HYPERLIPIDEMIA ASSOCIATED WITH TYPE 2 DIABETES MELLITUS: ICD-10-CM

## 2020-12-04 DIAGNOSIS — I70.0 ATHEROSCLEROSIS OF AORTA: ICD-10-CM

## 2020-12-04 DIAGNOSIS — E11.9 COMPREHENSIVE DIABETIC FOOT EXAMINATION, TYPE 2 DM, ENCOUNTER FOR: Primary | ICD-10-CM

## 2020-12-04 DIAGNOSIS — M79.606 PAIN OF LOWER EXTREMITY, UNSPECIFIED LATERALITY: ICD-10-CM

## 2020-12-04 DIAGNOSIS — E78.5 HYPERLIPIDEMIA ASSOCIATED WITH TYPE 2 DIABETES MELLITUS: ICD-10-CM

## 2020-12-04 DIAGNOSIS — M19.071 OSTEOARTHRITIS OF RIGHT ANKLE OR FOOT: ICD-10-CM

## 2020-12-04 DIAGNOSIS — Z11.9 ENCOUNTER FOR SCREENING EXAMINATION FOR INFECTIOUS DISEASE: Primary | ICD-10-CM

## 2020-12-04 DIAGNOSIS — E11.51 TYPE 2 DIABETES MELLITUS WITH DIABETIC PERIPHERAL ANGIOPATHY WITHOUT GANGRENE, WITHOUT LONG-TERM CURRENT USE OF INSULIN: ICD-10-CM

## 2020-12-04 DIAGNOSIS — E11.42 TYPE 2 DIABETES MELLITUS WITH DIABETIC POLYNEUROPATHY, WITHOUT LONG-TERM CURRENT USE OF INSULIN: ICD-10-CM

## 2020-12-04 DIAGNOSIS — M25.571 PAIN IN RIGHT ANKLE AND JOINTS OF RIGHT FOOT: ICD-10-CM

## 2020-12-04 DIAGNOSIS — M25.572 PAIN IN LEFT ANKLE AND JOINTS OF LEFT FOOT: ICD-10-CM

## 2020-12-04 LAB
CTP QC/QA: YES
SARS-COV-2 RDRP RESP QL NAA+PROBE: NEGATIVE

## 2020-12-04 PROCEDURE — 3074F PR MOST RECENT SYSTOLIC BLOOD PRESSURE < 130 MM HG: ICD-10-PCS | Mod: HCNC,CPTII,S$GLB, | Performed by: PODIATRIST

## 2020-12-04 PROCEDURE — 93922 UPR/L XTREMITY ART 2 LEVELS: CPT | Mod: 26,HCNC,, | Performed by: INTERNAL MEDICINE

## 2020-12-04 PROCEDURE — 99999 PR PBB SHADOW E&M-EST. PATIENT-LVL III: ICD-10-PCS | Mod: PBBFAC,HCNC,, | Performed by: PODIATRIST

## 2020-12-04 PROCEDURE — 3078F DIAST BP <80 MM HG: CPT | Mod: HCNC,CPTII,S$GLB, | Performed by: PODIATRIST

## 2020-12-04 PROCEDURE — 3288F FALL RISK ASSESSMENT DOCD: CPT | Mod: HCNC,CPTII,S$GLB, | Performed by: PODIATRIST

## 2020-12-04 PROCEDURE — 3074F SYST BP LT 130 MM HG: CPT | Mod: HCNC,CPTII,S$GLB, | Performed by: PODIATRIST

## 2020-12-04 PROCEDURE — 93922 ANKLE BRACHIAL INDICES (ABI): ICD-10-PCS | Mod: 26,HCNC,, | Performed by: INTERNAL MEDICINE

## 2020-12-04 PROCEDURE — 3078F PR MOST RECENT DIASTOLIC BLOOD PRESSURE < 80 MM HG: ICD-10-PCS | Mod: HCNC,CPTII,S$GLB, | Performed by: PODIATRIST

## 2020-12-04 PROCEDURE — 3072F PR LOW RISK FOR RETINOPATHY: ICD-10-PCS | Mod: HCNC,S$GLB,, | Performed by: PODIATRIST

## 2020-12-04 PROCEDURE — 93922 UPR/L XTREMITY ART 2 LEVELS: CPT | Mod: HCNC

## 2020-12-04 PROCEDURE — 99214 OFFICE O/P EST MOD 30 MIN: CPT | Mod: HCNC,S$GLB,, | Performed by: PODIATRIST

## 2020-12-04 PROCEDURE — 1101F PT FALLS ASSESS-DOCD LE1/YR: CPT | Mod: HCNC,CPTII,S$GLB, | Performed by: PODIATRIST

## 2020-12-04 PROCEDURE — U0002 COVID-19 LAB TEST NON-CDC: HCPCS | Mod: QW,S$GLB,, | Performed by: PHYSICIAN ASSISTANT

## 2020-12-04 PROCEDURE — 99211 OFF/OP EST MAY X REQ PHY/QHP: CPT | Mod: S$GLB,,, | Performed by: PHYSICIAN ASSISTANT

## 2020-12-04 PROCEDURE — 3072F LOW RISK FOR RETINOPATHY: CPT | Mod: HCNC,S$GLB,, | Performed by: PODIATRIST

## 2020-12-04 PROCEDURE — 1159F MED LIST DOCD IN RCRD: CPT | Mod: HCNC,S$GLB,, | Performed by: PODIATRIST

## 2020-12-04 PROCEDURE — 1159F PR MEDICATION LIST DOCUMENTED IN MEDICAL RECORD: ICD-10-PCS | Mod: HCNC,S$GLB,, | Performed by: PODIATRIST

## 2020-12-04 PROCEDURE — 1125F AMNT PAIN NOTED PAIN PRSNT: CPT | Mod: HCNC,S$GLB,, | Performed by: PODIATRIST

## 2020-12-04 PROCEDURE — 99214 PR OFFICE/OUTPT VISIT, EST, LEVL IV, 30-39 MIN: ICD-10-PCS | Mod: HCNC,S$GLB,, | Performed by: PODIATRIST

## 2020-12-04 PROCEDURE — 99999 PR PBB SHADOW E&M-EST. PATIENT-LVL III: CPT | Mod: PBBFAC,HCNC,, | Performed by: PODIATRIST

## 2020-12-04 PROCEDURE — 1101F PR PT FALLS ASSESS DOC 0-1 FALLS W/OUT INJ PAST YR: ICD-10-PCS | Mod: HCNC,CPTII,S$GLB, | Performed by: PODIATRIST

## 2020-12-04 PROCEDURE — U0002: ICD-10-PCS | Mod: QW,S$GLB,, | Performed by: PHYSICIAN ASSISTANT

## 2020-12-04 PROCEDURE — 1125F PR PAIN SEVERITY QUANTIFIED, PAIN PRESENT: ICD-10-PCS | Mod: HCNC,S$GLB,, | Performed by: PODIATRIST

## 2020-12-04 PROCEDURE — 3288F PR FALLS RISK ASSESSMENT DOCUMENTED: ICD-10-PCS | Mod: HCNC,CPTII,S$GLB, | Performed by: PODIATRIST

## 2020-12-04 PROCEDURE — 99211 PR OFFICE/OUTPT VISIT, EST, LEVL I: ICD-10-PCS | Mod: S$GLB,,, | Performed by: PHYSICIAN ASSISTANT

## 2020-12-04 RX ORDER — GABAPENTIN 600 MG/1
600 TABLET ORAL 2 TIMES DAILY
Qty: 90 TABLET | Refills: 0 | Status: SHIPPED | OUTPATIENT
Start: 2020-12-04 | End: 2020-12-04

## 2020-12-04 RX ORDER — DICLOFENAC SODIUM 10 MG/G
2 GEL TOPICAL 4 TIMES DAILY
Qty: 100 G | Refills: 5 | Status: SHIPPED | OUTPATIENT
Start: 2020-12-04 | End: 2020-12-04

## 2020-12-04 RX ORDER — DICLOFENAC SODIUM 10 MG/G
2 GEL TOPICAL 4 TIMES DAILY
Qty: 100 G | Refills: 5 | Status: SHIPPED | OUTPATIENT
Start: 2020-12-04 | End: 2022-12-16

## 2020-12-04 RX ORDER — GABAPENTIN 600 MG/1
600 TABLET ORAL 2 TIMES DAILY
Qty: 180 TABLET | Refills: 0 | Status: SHIPPED | OUTPATIENT
Start: 2020-12-04 | End: 2021-04-21 | Stop reason: SDUPTHER

## 2020-12-04 NOTE — PROGRESS NOTES
Subjective:       Patient ID: Srinath Mcknight is a 80 y.o. male.    Chief Complaint: Diabetic Foot Exam (6/10, [place] w/darshana, diabetic, pcp Dr. Wilder.)      HPI: Srinath Mcknight presents to the office today, under referral by their Primary Care Provider, Yeison Wilder MD, for his annual diabetic foot assessment and risk evalution Patient is a DMII. Patient states neuropathy, peripheral arterial disease, venous insufficiency and kidney pathology. This patient last saw his/her primary care provider on 11/18/2020. Complains of B/L dorsal foot pains and worsening neuritis. Soaks his feet in warm water and Epson salt. Foot pains are 6/10.    Hemoglobin A1C   Date Value Ref Range Status   08/17/2020 5.5 4.0 - 5.6 % Final     Comment:     ADA Screening Guidelines:  5.7-6.4%  Consistent with prediabetes  >or=6.5%  Consistent with diabetes  High levels of fetal hemoglobin interfere with the HbA1C  assay. Heterozygous hemoglobin variants (HbS, HgC, etc)do  not significantly interfere with this assay.   However, presence of multiple variants may affect accuracy.     06/12/2019 5.9 (H) 4.0 - 5.6 % Final     Comment:     ADA Screening Guidelines:  5.7-6.4%  Consistent with prediabetes  >or=6.5%  Consistent with diabetes  High levels of fetal hemoglobin interfere with the HbA1C  assay. Heterozygous hemoglobin variants (HbS, HgC, etc)do  not significantly interfere with this assay.   However, presence of multiple variants may affect accuracy.     10/30/2018 5.6 4.0 - 5.6 % Final     Comment:     ADA Screening Guidelines:  5.7-6.4%  Consistent with prediabetes  >or=6.5%  Consistent with diabetes  High levels of fetal hemoglobin interfere with the HbA1C  assay. Heterozygous hemoglobin variants (HbS, HgC, etc)do  not significantly interfere with this assay.   However, presence of multiple variants may affect accuracy.     .    Review of patient's allergies indicates:   Allergen Reactions    Sulfa (sulfonamide antibiotics)       Can't recall from 1995       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    Trouble in sleeping     Type 2 diabetes mellitus with diabetic polyneuropathy, without long-term current use of insulin     Urinary incontinence     Uses hearing aid     bilat       Family History   Problem Relation Age of Onset    Hypertension Mother     Heart disease Mother     Diabetes Mother     Hypertension Father     Heart disease Father     Diabetes Father     Stroke Father     Diabetes Sister     Cancer Brother 50        pancreas    Drug abuse Brother     Prostate cancer Neg Hx     Macular degeneration Neg Hx     Retinal detachment Neg Hx     Strabismus Neg Hx     Glaucoma Neg Hx     Blindness Neg Hx     Amblyopia Neg Hx     Kidney disease Neg Hx     Mental illness Neg Hx     Mental retardation Neg Hx     COPD Neg Hx     Asthma Neg Hx        Social History     Socioeconomic History    Marital status:      Spouse name: Not on file    Number of children: 0    Years of education: Not on file    Highest education level: Master's degree (e.g., MA, MS, Olegario, MEd, MSW, CARMEL)   Occupational History    Occupation: retired     Comment: teacher   Social Needs    Financial resource strain: Not hard at all    Food insecurity     Worry: Never true     Inability: Never true    Transportation needs     Medical: No     Non-medical: No   Tobacco Use    Smoking status:  Never Smoker    Smokeless tobacco: Never Used   Substance and Sexual Activity    Alcohol use: Yes     Alcohol/week: 1.0 standard drinks     Types: 1 Glasses of wine per week     Comment: rarely  No alcohol prior to surgery    Drug use: No    Sexual activity: Yes     Partners: Female     Birth control/protection: Condom   Lifestyle    Physical activity     Days per week: 2 days     Minutes per session: 10 min    Stress: Not at all   Relationships    Social connections     Talks on phone: Three times a week     Gets together: Once a week     Attends Yazidism service: More than 4 times per year     Active member of club or organization: Yes     Attends meetings of clubs or organizations: More than 4 times per year     Relationship status:    Other Topics Concern    Not on file   Social History Narrative    Single lives alone. No children. Retired but some part time work - 4 hours a day. 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 Caffeine intake - sugar free cola, Rare coffee intake. Still drives. Does have a Living Will . Has a nephew Jt Gayle, lives in [place]. Has a friend Karina Rossi, locally who could help him.        Past Surgical History:   Procedure Laterality Date    BACK SURGERY  2019    COLONOSCOPY N/A 6/30/2020    Procedure: COLONOSCOPY;  Surgeon: Joseph Iraheta MD;  Location: [facility];  Service: Endoscopy;  Laterality: N/A;    ESOPHAGOGASTRODUODENOSCOPY N/A 6/30/2020    Procedure: EGD (ESOPHAGOGASTRODUODENOSCOPY);  Surgeon: Joseph Iraheta MD;  Location: [facility];  Service: Endoscopy;  Laterality: N/A;    HERNIA REPAIR Bilateral 04/18/2017    JOINT REPLACEMENT Left 10/09/2017    L YAHIR Dr. Alcocer    LAMINECTOMY  07/22/2016    left elbow  10/2017    procedure to remove gout    lumbar foraminotomy  03/2018    REVISION TOTAL HIP ARTHROPLASTY Left 9/11/2018    Procedure: REVISION, TOTAL ARTHROPLASTY, HIP;  Surgeon: Albaro Alcocer Sr., MD;  Location[facility];  Service:  "Orthopedics;  Laterality: Left;    ROTATOR CUFF REPAIR Left 2006    SHOULDER ARTHROSCOPY W/ ROTATOR CUFF REPAIR Right 2009       Review of Systems   Constitutional: Negative for chills, fatigue and fever.   HENT: Negative for hearing loss.    Eyes: Negative for photophobia and visual disturbance.   Respiratory: Negative for cough, chest tightness, shortness of breath and wheezing.    Cardiovascular: Positive for leg swelling. Negative for chest pain and palpitations.   Gastrointestinal: Negative for constipation, diarrhea, nausea and vomiting.   Endocrine: Negative for cold intolerance and heat intolerance.   Genitourinary: Negative for flank pain.   Musculoskeletal: Positive for arthralgias and gait problem. Negative for neck pain and neck stiffness.   Skin: Negative for wound.   Neurological: Positive for numbness. Negative for light-headedness and headaches.   Psychiatric/Behavioral: Negative for sleep disturbance.         Objective:   /68   Pulse 83   Ht 6' 3" (1.905 m)   Wt 99 kg (218 lb 4.1 oz)   BMI 27.28 kg/m²     Physical Exam  LOWER EXTREMITY PHYSICAL EXAMINATION    ORTHOPEDIC: Manual Muscle Testing is 5/5 in all planes on the left and right, without pains, with and without resistance. Pes planus foot type is noted. No pain upon range of motion of the midfoot or hindfoot joints.  Discomfort to palpation of bilateral dorsal forefoot at the MTPJs as well as at the mid tarsal joint.  Palpable underlying osteoarthritis is noted at the mid tarsal joint.  Gait pattern is nonantalgic.    VASCULAR: On the left and right foot, the dorsalis pedis pulse is 1/4 and the posterior tibial pulse is 0/4. Capillary refill time is less than 3 seconds. Hair growth is present on the dorsum of the foot and at the digits. No rubor is present. Proximal to distal temperature is warm to warm.  Edema is noted, 1+ pitting.    NEUROLOGY: Sensation to light touch is intact. Proprioception is intact, bilateral. Sensation to " pin prick is reduced to absent. Vibratory sensation is diminished to the left and right lower extremity. Examination with 5.07 [place] monofilament reveals that protective sensation is not intact to the left and right plantar surfaces of the foot and digits, as the patient has no sensation/detection at greater than 4 distinct points of contact.     DERMATOLOGY: Skin is supple, moist, dry and intact.     Assessment:     1. Comprehensive diabetic foot examination, type 2 DM, encounter for    2. Type 2 diabetes mellitus with diabetic polyneuropathy, without long-term current use of insulin    3. Type 2 diabetes mellitus with diabetic peripheral angiopathy without gangrene, without long-term current use of insulin    4. Venous insufficiency (chronic) (peripheral)    5. Osteoarthritis of right ankle or foot    6. Osteoarthritis of left ankle or foot    7. Pain in left ankle and joints of left foot    8. Pain in right ankle and joints of right foot        Plan:     Comprehensive diabetic foot examination, type 2 DM, encounter for  Type 2 diabetes mellitus with diabetic polyneuropathy, without long-term current use of insulin  Type 2 diabetes mellitus with diabetic peripheral angiopathy without gangrene, without long-term current use of insulin  -     Discontinue: gabapentin (NEURONTIN) 600 MG tablet; Take 1 tablet (600 mg total) by mouth 2 (two) times daily.  Dispense: 90 tablet; Refill: 0  -     gabapentin (NEURONTIN) 600 MG tablet; Take 1 tablet (600 mg total) by mouth 2 (two) times daily.  Dispense: 180 tablet; Refill: 0    I counseled the patient on his/her Diabetic Mellitus regarding today's clinical examination and objection findings. We did also discuss recent medication changes, pertinent labs and imaging evaluations and other medical consultation notes and progress notes. Greater than 50% of this visit was spent on counseling and coordination of care. Greater than 20 minutes of this appt. was spent on  education about the diabetic foot, in relation to PVD and/or neuropathy, and the prevention of limb loss.     Shoe gear is inspected and wear and proper fit/type. Patient is reminded of the importance of good nutrition and blood sugar control to help prevent podiatric complications of diabetes. Patient instructed on proper foot hygeine. We discussed wearing proper shoe gear, daily foot inspections, never walking without protective shoe gear, never putting sharp instruments to feet.  Patient  will continue to monitor the areas daily, inspect feet, wear protective shoe gear when ambulatory, moisturizer to maintain skin integrity.     Patient's DMI/DMII is managed by Primary Care Provider and/or Endocrinology Advanced Practice Provider. As per the most recent glycohemoglobin level, this patient is at goal.    Patient will increase his Neurontin to 600 mg twice daily, from 300mg TID.    Venous insufficiency (chronic) (peripheral)    Osteoarthritis of right ankle or foot  Osteoarthritis of left ankle or foot  Pain in left ankle and joints of left foot  Pain in right ankle and joints of right foot  -     Discontinue: diclofenac sodium (VOLTAREN) 1 % Gel; Apply 2 g topically 4 (four) times daily.  Dispense: 100 g; Refill: 5  -     diclofenac sodium (VOLTAREN) 1 % Gel; Apply 2 g topically 4 (four) times daily.  Dispense: 100 g; Refill: 5    Thorough discussion is had with the patient today, concerning the diagnosis, its etiology, and the treatment algorithm at present.    Please start topical, 4 times daily, as needed.     Protective Sensation (w/ 10 gram monofilament):  Right: Absent  Left: Absent    Visual Inspection:  Dry Skin -  Bilateral    Pedal Pulses:   Right: Diminshed  Left: Diminshed    Posterior tibialis:   Right:Absent  Left: Absent              Future Appointments   Date Time Provider Department Center   12/8/2020  9:00 AM Megha George NP [facility] HEM [facility]   12/21/2020 10:40 AM MINH JONES  PRV LAB [facility]   12/23/2020  9:30 AM Elver Cobos MD [facility]   2/3/2021  9:20 AM Martin Machuca MD [facility] PULMSVC [facility]   2/17/2021  9:40 AM BIJAN Crews PRV IM [facility]   6/4/2021  9:45 AM Amol New OD [facility] OPHTHAL [facility]   [facility]  [facility]   11/15/2021  8:50 AM Chauncey Amador IV, MD [facility] UROLOGY [facility]

## 2020-12-05 LAB
LEFT ABI: 1.23
LEFT ARM BP: 133 MMHG
LEFT DORSALIS PEDIS: 160 MMHG
LEFT POSTERIOR TIBIAL: 164 MMHG
LEFT TBI: 0.87
LEFT TOE PRESSURE: 116 MMHG
RIGHT ABI: 1.38
RIGHT ARM BP: 126 MMHG
RIGHT DORSALIS PEDIS: 184 MMHG
RIGHT POSTERIOR TIBIAL: 172 MMHG
RIGHT TBI: 1.11
RIGHT TOE PRESSURE: 147 MMHG

## 2020-12-07 ENCOUNTER — TELEPHONE (OUTPATIENT)
Dept: INTERNAL MEDICINE | Facility: CLINIC | Age: 80
End: 2020-12-07

## 2020-12-07 NOTE — TELEPHONE ENCOUNTER
----- Message from Sandra Cook NP sent at 12/7/2020  8:23 AM CST -----  · KAMALJIT is normal   advise to follow up with PCP for further evaluation and treatment of leg pain, as well as, other AWV findings.

## 2020-12-08 ENCOUNTER — OFFICE VISIT (OUTPATIENT)
Dept: HEMATOLOGY/ONCOLOGY | Facility: CLINIC | Age: 80
End: 2020-12-08
Payer: MEDICARE

## 2020-12-08 VITALS
TEMPERATURE: 98 F | WEIGHT: 218.94 LBS | DIASTOLIC BLOOD PRESSURE: 69 MMHG | SYSTOLIC BLOOD PRESSURE: 127 MMHG | BODY MASS INDEX: 27.22 KG/M2 | HEIGHT: 75 IN | OXYGEN SATURATION: 100 % | RESPIRATION RATE: 16 BRPM | HEART RATE: 84 BPM

## 2020-12-08 DIAGNOSIS — D64.9 ANEMIA DUE TO UNKNOWN MECHANISM: ICD-10-CM

## 2020-12-08 DIAGNOSIS — J30.89 SEASONAL ALLERGIC RHINITIS DUE TO OTHER ALLERGIC TRIGGER: Primary | ICD-10-CM

## 2020-12-08 PROCEDURE — 3074F SYST BP LT 130 MM HG: CPT | Mod: HCNC,CPTII,S$GLB, | Performed by: NURSE PRACTITIONER

## 2020-12-08 PROCEDURE — 1101F PT FALLS ASSESS-DOCD LE1/YR: CPT | Mod: HCNC,CPTII,S$GLB, | Performed by: NURSE PRACTITIONER

## 2020-12-08 PROCEDURE — 1159F PR MEDICATION LIST DOCUMENTED IN MEDICAL RECORD: ICD-10-PCS | Mod: HCNC,S$GLB,, | Performed by: NURSE PRACTITIONER

## 2020-12-08 PROCEDURE — 3288F PR FALLS RISK ASSESSMENT DOCUMENTED: ICD-10-PCS | Mod: HCNC,CPTII,S$GLB, | Performed by: NURSE PRACTITIONER

## 2020-12-08 PROCEDURE — 99499 RISK ADDL DX/OHS AUDIT: ICD-10-PCS | Mod: S$GLB,,, | Performed by: NURSE PRACTITIONER

## 2020-12-08 PROCEDURE — 1101F PR PT FALLS ASSESS DOC 0-1 FALLS W/OUT INJ PAST YR: ICD-10-PCS | Mod: HCNC,CPTII,S$GLB, | Performed by: NURSE PRACTITIONER

## 2020-12-08 PROCEDURE — 99499 UNLISTED E&M SERVICE: CPT | Mod: S$GLB,,, | Performed by: NURSE PRACTITIONER

## 2020-12-08 PROCEDURE — 3078F PR MOST RECENT DIASTOLIC BLOOD PRESSURE < 80 MM HG: ICD-10-PCS | Mod: HCNC,CPTII,S$GLB, | Performed by: NURSE PRACTITIONER

## 2020-12-08 PROCEDURE — 3072F LOW RISK FOR RETINOPATHY: CPT | Mod: HCNC,S$GLB,, | Performed by: NURSE PRACTITIONER

## 2020-12-08 PROCEDURE — 99999 PR PBB SHADOW E&M-EST. PATIENT-LVL IV: ICD-10-PCS | Mod: PBBFAC,HCNC,, | Performed by: NURSE PRACTITIONER

## 2020-12-08 PROCEDURE — 1159F MED LIST DOCD IN RCRD: CPT | Mod: HCNC,S$GLB,, | Performed by: NURSE PRACTITIONER

## 2020-12-08 PROCEDURE — 3078F DIAST BP <80 MM HG: CPT | Mod: HCNC,CPTII,S$GLB, | Performed by: NURSE PRACTITIONER

## 2020-12-08 PROCEDURE — 99214 OFFICE O/P EST MOD 30 MIN: CPT | Mod: HCNC,S$GLB,, | Performed by: NURSE PRACTITIONER

## 2020-12-08 PROCEDURE — 3072F PR LOW RISK FOR RETINOPATHY: ICD-10-PCS | Mod: HCNC,S$GLB,, | Performed by: NURSE PRACTITIONER

## 2020-12-08 PROCEDURE — 99214 PR OFFICE/OUTPT VISIT, EST, LEVL IV, 30-39 MIN: ICD-10-PCS | Mod: HCNC,S$GLB,, | Performed by: NURSE PRACTITIONER

## 2020-12-08 PROCEDURE — 1126F AMNT PAIN NOTED NONE PRSNT: CPT | Mod: HCNC,S$GLB,, | Performed by: NURSE PRACTITIONER

## 2020-12-08 PROCEDURE — 99999 PR PBB SHADOW E&M-EST. PATIENT-LVL IV: CPT | Mod: PBBFAC,HCNC,, | Performed by: NURSE PRACTITIONER

## 2020-12-08 PROCEDURE — 3288F FALL RISK ASSESSMENT DOCD: CPT | Mod: HCNC,CPTII,S$GLB, | Performed by: NURSE PRACTITIONER

## 2020-12-08 PROCEDURE — 1126F PR PAIN SEVERITY QUANTIFIED, NO PAIN PRESENT: ICD-10-PCS | Mod: HCNC,S$GLB,, | Performed by: NURSE PRACTITIONER

## 2020-12-08 PROCEDURE — 3074F PR MOST RECENT SYSTOLIC BLOOD PRESSURE < 130 MM HG: ICD-10-PCS | Mod: HCNC,CPTII,S$GLB, | Performed by: NURSE PRACTITIONER

## 2020-12-08 RX ORDER — FLUTICASONE PROPIONATE 50 MCG
1 SPRAY, SUSPENSION (ML) NASAL DAILY
Qty: 11.1 ML | Refills: 0 | Status: SHIPPED | OUTPATIENT
Start: 2020-12-08 | End: 2020-12-08

## 2020-12-08 NOTE — PROGRESS NOTES
Subjective:       Patient ID: Srinath Mcknight is a 80 y.o. male.    Chief Complaint: F/U anemia    HPI:  80 y.o. Male with HTN, OA, chronic anemia, DM, CAD, HLD follows up in the Heme-Onc clinic for h/o chronic anemia of unknown origin. The patient has had workup for chronic anemia of unknown origin. The laboratory studies are unremarkable. The patient has two negative stool occult blood testing on file. The patient continues to take daily iron pills. He tells me he was instructed to take iron pills by his PCP but has no knowledge of iron deficiency history.  Upon review of the medical record the patient has not had documented iron deficiency but iron levels are low normal. Patient does have sickle cell trait. He tells me this was already known to him.     Today's visit: Patient presents today for follow up of his chronic anemia. He continues to take oral iron replacement. Taking vitamin B12 supplement without missed doses. He is without new complaints. Noted increased fatigue recently but reports better with change in diabetic medication regimen.       Social History     Socioeconomic History    Marital status:      Spouse name: Not on file    Number of children: 0    Years of education: Not on file    Highest education level: Master's degree (e.g., MA, MS, Olegario, MEd, MSW, CARMEL)   Occupational History    Occupation: retired     Comment: teacher   Social Needs    Financial resource strain: Not hard at all    Food insecurity     Worry: Never true     Inability: Never true    Transportation needs     Medical: No     Non-medical: No   Tobacco Use    Smoking status: Never Smoker    Smokeless tobacco: Never Used   Substance and Sexual Activity    Alcohol use: Yes     Alcohol/week: 1.0 standard drinks     Types: 1 Glasses of wine per week     Comment: rarely  No alcohol prior to surgery    Drug use: No    Sexual activity: Yes     Partners: Female     Birth control/protection: Condom   Lifestyle     Physical activity     Days per week: 2 days     Minutes per session: 10 min    Stress: Not at all   Relationships    Social connections     Talks on phone: Three times a week     Gets together: Once a week     Attends Mandaen service: More than 4 times per year     Active member of club or organization: Yes     Attends meetings of clubs or organizations: More than 4 times per year     Relationship status:    Other Topics Concern    Not on file   Social History Narrative    Single lives alone. No children. 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 Caffeine intake - sugar free cola, Rare coffee intake. Still drives. Does have a Living Will . Has a nephew Jt Gayle, lives in [place]. Has a friend Karina Rossi, locally who could help him.        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    Trouble in sleeping     Type 2 diabetes mellitus with diabetic polyneuropathy, without long-term current use of insulin     Urinary incontinence     Uses hearing aid     bilat       Family History   Problem Relation Age of Onset    Hypertension Mother     Heart disease Mother     Diabetes Mother     Hypertension Father     Heart disease Father     Diabetes Father     Stroke Father     Diabetes Sister     Cancer Brother 50        pancreas    Drug abuse Brother     Prostate cancer Neg Hx     Macular degeneration Neg Hx      Retinal detachment Neg Hx     Strabismus Neg Hx     Glaucoma Neg Hx     Blindness Neg Hx     Amblyopia Neg Hx     Kidney disease Neg Hx     Mental illness Neg Hx     Mental retardation Neg Hx     COPD Neg Hx     Asthma Neg Hx        Past Surgical History:   Procedure Laterality Date    BACK SURGERY  2019    COLONOSCOPY N/A 6/30/2020    Procedure: COLONOSCOPY;  Surgeon: Joseph Iraheta MD;  Location: [facility];  Service: Endoscopy;  Laterality: N/A;    ESOPHAGOGASTRODUODENOSCOPY N/A 6/30/2020    Procedure: EGD (ESOPHAGOGASTRODUODENOSCOPY);  Surgeon: Joseph Iraheta MD;  Location: [facility];  Service: Endoscopy;  Laterality: N/A;    HERNIA REPAIR Bilateral 04/18/2017    JOINT REPLACEMENT Left 10/09/2017    L YAHIR Dr. Alcocer    LAMINECTOMY  07/22/2016    left elbow  10/2017    procedure to remove gout    lumbar foraminotomy  03/2018    REVISION TOTAL HIP ARTHROPLASTY Left 9/11/2018    Procedure: REVISION, TOTAL ARTHROPLASTY, HIP;  Surgeon: Albaro Alcocer Sr., MD;  Location[facility];  Service: Orthopedics;  Laterality: Left;    ROTATOR CUFF REPAIR Left 2006    SHOULDER ARTHROSCOPY W/ ROTATOR CUFF REPAIR Right 2009       Review of Systems   Constitutional: Negative for activity change, appetite change, chills, diaphoresis, fatigue, fever and unexpected weight change.   HENT: Negative for nosebleeds, sore throat, trouble swallowing and voice change.    Eyes: Negative for visual disturbance.   Respiratory: Negative for cough, chest tightness, shortness of breath and wheezing.    Cardiovascular: Negative for chest pain, palpitations and leg swelling.   Gastrointestinal: Negative for abdominal distention, abdominal pain, anal bleeding, blood in stool, constipation, diarrhea, nausea and vomiting.   Genitourinary: Negative for difficulty urinating, dysuria and hematuria.   Musculoskeletal: Positive for arthralgias and gait problem (utilizes cane). Negative for back pain and myalgias.         Patient walks with a cane   Skin: Negative for rash and wound.   Neurological: Negative for dizziness, weakness, light-headedness and headaches.   Hematological: Does not bruise/bleed easily.   Psychiatric/Behavioral: The patient is not nervous/anxious.          Medication List with Changes/Refills   New Medications    FLUTICASONE PROPIONATE (FLONASE) 50 MCG/ACTUATION NASAL SPRAY    1 spray (50 mcg total) by Each Nostril route once daily.   Current Medications    ACCU-CHEK FRANKO PLUS TEST STRP STRP    TEST BLOOD SUGAR EVERY DAY    ACCU-CHEK SOFTCLIX LANCETS MISC    TEST ONE TIME DAILY    ALLOPURINOL (ZYLOPRIM) 100 MG TABLET    TAKE 1 TABLET EVERY DAY    ASPIRIN (ECOTRIN) 81 MG EC TABLET    Take 1 tablet (81 mg total) by mouth once daily.    BISACODYL (DULCOLAX) 10 MG SUPP        BLOOD-GLUCOSE METER (ACCU-CHEK FRANKO PLUS METER) MISC    1 Device by Misc.(Non-Drug; Combo Route) route once daily.    CYANOCOBALAMIN, VITAMIN B-12, 1,000 MCG SUBL    Place 1,000 mcg under the tongue once daily.    DICLOFENAC SODIUM (VOLTAREN) 1 % GEL    Apply 2 g topically 4 (four) times daily.    DOCUSATE SODIUM (COLACE) 100 MG CAPSULE    Take 1 capsule (100 mg total) by mouth 2 (two) times daily as needed.    EMPAGLIFLOZIN (JARDIANCE) 10 MG TABLET    Take 1 tablet (10 mg total) by mouth once daily.    FERROUS SULFATE 324 MG (65 MG IRON) TBEC    Take 1 tablet (324 mg total) by mouth once daily.    FINASTERIDE (PROSCAR) 5 MG TABLET    Take 1 tablet (5 mg total) by mouth once daily.    GABAPENTIN (NEURONTIN) 600 MG TABLET    Take 1 tablet (600 mg total) by mouth 2 (two) times daily.    GARLIC EXTRACT ORAL    Take 1 tablet by mouth once daily. Per [facility]    LOSARTAN (COZAAR) 50 MG TABLET    Take 50 mg by mouth 2 (two) times a day.    MELATONIN ORAL    Take by mouth every evening.    METFORMIN (GLUCOPHAGE) 1000 MG TABLET    TAKE 1 TABLET TWICE DAILY WITH MEALS    METOPROLOL SUCCINATE (TOPROL-XL) 50 MG 24 HR TABLET    TAKE 1 TABLET  "EVERY DAY    MULTIVITAMIN (ONE DAILY MULTIVITAMIN) PER TABLET    Take 1 tablet by mouth once daily.    PAPAVERINE 30 MG/ML INJECTION    Inject 0.3 ml of trimix solution prn ED max once daily  Prepare 10ml of trimix solution containing:    Papaverine 30mg/ml    Phentolamine 1 mg/ml    Alprostadil 10mcg/ml  Dispense 10ml per refill  Qs syringes 1cc/30g/0.5" and alcohol swabs dispense as needed for Intracavernosal injection    PRAVASTATIN (PRAVACHOL) 40 MG TABLET    TAKE 1 TABLET EVERY DAY    SILDENAFIL (VIAGRA) 100 MG TABLET    Take 1 tablet (100 mg total) by mouth daily as needed for Erectile Dysfunction.    TADALAFIL (CIALIS) 5 MG TABLET    Take 1 tablet (5 mg total) by mouth daily as needed for Erectile Dysfunction.     Objective:     Vitals:    12/08/20 0903   BP: 127/69   Pulse: 84   Resp: 16   Temp: 97.6 °F (36.4 °C)     Lab Results   Component Value Date    WBC 5.18 06/12/2020    HGB 13.1 (L) 06/12/2020    HCT 40.1 06/12/2020    MCV 88 06/12/2020     06/12/2020     Lab Results   Component Value Date    IRON 91 12/04/2020    TIBC 278 12/04/2020    FERRITIN 101 12/04/2020     BMP  Lab Results   Component Value Date     (H) 08/17/2020    K 4.0 08/17/2020     08/17/2020    CO2 24 08/17/2020    BUN 23 08/17/2020    CREATININE 1.4 08/17/2020    CALCIUM 9.0 08/17/2020    ANIONGAP 13 08/17/2020    ESTGFRAFRICA 55.6 (A) 08/17/2020    EGFRNONAA 48.1 (A) 08/17/2020     Lab Results   Component Value Date    ALT 20 08/17/2020    AST 28 08/17/2020    ALKPHOS 66 08/17/2020    BILITOT 0.4 08/17/2020               Physical Exam  Vitals signs reviewed.   Constitutional:       General: He is not in acute distress.     Appearance: He is well-developed.   HENT:      Head: Normocephalic.      Right Ear: External ear normal.      Left Ear: External ear normal.   Eyes:      General: Lids are normal.         Right eye: No discharge.         Left eye: No discharge.      Conjunctiva/sclera: Conjunctivae normal.   Neck: "      Musculoskeletal: Normal range of motion.      Thyroid: No thyroid mass.   Cardiovascular:      Rate and Rhythm: Normal rate and regular rhythm.      Heart sounds: Normal heart sounds. No murmur. No friction rub. No gallop.    Pulmonary:      Effort: Pulmonary effort is normal. No respiratory distress.      Breath sounds: Normal breath sounds. No wheezing or rales.   Chest:      Chest wall: No tenderness.   Abdominal:      General: Bowel sounds are normal. There is no distension.      Palpations: Abdomen is soft.   Genitourinary:     Comments: deferred  Musculoskeletal: Normal range of motion.      Comments: Patient walks with a cane.    Lymphadenopathy:      Head:      Right side of head: No submandibular, preauricular or posterior auricular adenopathy.      Left side of head: No submandibular, preauricular or posterior auricular adenopathy.   Skin:     General: Skin is warm and dry.   Neurological:      Mental Status: He is alert and oriented to person, place, and time.   Psychiatric:         Speech: Speech normal.         Behavior: Behavior normal. Behavior is cooperative.         Thought Content: Thought content normal.          Assessment:     Problem List Items Addressed This Visit        Oncology    Anemia due to unknown mechanism     Anemia multifactorial including CKD, chronic disease    Laboratory review overall stable. Needs CBC, CMP. Will add to upcoming lab appointment 12/21/20.    Previously noted Vitamin B12 on the lower end of normal. He was asked to take sublingual Vitamin B12 1,000 mcg daily. He reports taking Vitamin B12/Iron pills daily    EGD/Colonoscopy done 6/2020. EGD revealed gastritis with pathology positive for H. Pylori. Colonoscopy reveal polyps with unremarkable pathology, recommended repeat in 5 years    Previous Referral to Nephrology for CKD III. Appointment cancelled due to COVID concerns. Plans to reschedule pending recent cmp results    Continue B12 supplementation. Continue  iron supplement per patient preference. F/u 6 months with repeat labs             Other Visit Diagnoses     Seasonal allergic rhinitis due to other allergic trigger    -  Primary    Relevant Medications    fluticasone propionate (FLONASE) 50 mcg/actuation nasal spray    Other Relevant Orders    CBC Auto Differential    Comprehensive Metabolic Panel    CBC Auto Differential    Basic Metabolic Panel    Immunoglobulin free LT chains blood    Protein Electrophoresis, Serum    Immunofixation Electrophoresis            Plan:     Seasonal allergic rhinitis due to other allergic trigger  -     fluticasone propionate (FLONASE) 50 mcg/actuation nasal spray; 1 spray (50 mcg total) by Each Nostril route once daily.  Dispense: 11.1 mL; Refill: 0  -     CBC Auto Differential; Future; Expected date: 12/08/2020  -     Comprehensive Metabolic Panel; Future; Expected date: 12/08/2020  -     CBC Auto Differential; Future; Expected date: 12/08/2020  -     Basic Metabolic Panel; Future; Expected date: 12/08/2020  -     Immunoglobulin free LT chains blood; Future; Expected date: 12/08/2020  -     Protein Electrophoresis, Serum; Future; Expected date: 12/08/2020  -     Immunofixation Electrophoresis; Future; Expected date: 12/08/2020    Anemia due to unknown mechanism              ZEINAB Yuen

## 2020-12-11 ENCOUNTER — PATIENT MESSAGE (OUTPATIENT)
Dept: OTHER | Facility: OTHER | Age: 80
End: 2020-12-11

## 2020-12-18 NOTE — INTERVAL H&P NOTE
The patient has been examined and the H&P has been reviewed:    I concur with the findings and no changes have occurred since H&P was written.    Anesthesia/Surgery risks, benefits and alternative options discussed and understood by patient/family.          Active Hospital Problems    Diagnosis  POA    Bilateral inguinal hernia without obstruction or gangrene [K40.20]  Yes      Resolved Hospital Problems    Diagnosis Date Resolved POA   No resolved problems to display.      none

## 2020-12-21 ENCOUNTER — LAB VISIT (OUTPATIENT)
Dept: LAB | Facility: HOSPITAL | Age: 80
End: 2020-12-21
Attending: NUCLEAR MEDICINE
Payer: MEDICARE

## 2020-12-21 DIAGNOSIS — I10 ESSENTIAL HYPERTENSION: Chronic | ICD-10-CM

## 2020-12-21 DIAGNOSIS — E78.2 COMBINED HYPERLIPIDEMIA ASSOCIATED WITH TYPE 2 DIABETES MELLITUS: Chronic | ICD-10-CM

## 2020-12-21 DIAGNOSIS — E11.69 COMBINED HYPERLIPIDEMIA ASSOCIATED WITH TYPE 2 DIABETES MELLITUS: Chronic | ICD-10-CM

## 2020-12-21 DIAGNOSIS — J30.89 SEASONAL ALLERGIC RHINITIS DUE TO OTHER ALLERGIC TRIGGER: ICD-10-CM

## 2020-12-21 LAB
ALBUMIN SERPL BCP-MCNC: 3.7 G/DL (ref 3.5–5.2)
ALP SERPL-CCNC: 66 U/L (ref 55–135)
ALT SERPL W/O P-5'-P-CCNC: 14 U/L (ref 10–44)
ANION GAP SERPL CALC-SCNC: 10 MMOL/L (ref 8–16)
ANION GAP SERPL CALC-SCNC: 10 MMOL/L (ref 8–16)
AST SERPL-CCNC: 19 U/L (ref 10–40)
BASOPHILS # BLD AUTO: 0.05 K/UL (ref 0–0.2)
BASOPHILS NFR BLD: 1.1 % (ref 0–1.9)
BILIRUB SERPL-MCNC: 0.4 MG/DL (ref 0.1–1)
BUN SERPL-MCNC: 16 MG/DL (ref 8–23)
BUN SERPL-MCNC: 16 MG/DL (ref 8–23)
CALCIUM SERPL-MCNC: 8.1 MG/DL (ref 8.7–10.5)
CALCIUM SERPL-MCNC: 8.1 MG/DL (ref 8.7–10.5)
CHLORIDE SERPL-SCNC: 110 MMOL/L (ref 95–110)
CHLORIDE SERPL-SCNC: 110 MMOL/L (ref 95–110)
CO2 SERPL-SCNC: 25 MMOL/L (ref 23–29)
CO2 SERPL-SCNC: 25 MMOL/L (ref 23–29)
CREAT SERPL-MCNC: 1.4 MG/DL (ref 0.5–1.4)
CREAT SERPL-MCNC: 1.4 MG/DL (ref 0.5–1.4)
DIFFERENTIAL METHOD: ABNORMAL
EOSINOPHIL # BLD AUTO: 0.2 K/UL (ref 0–0.5)
EOSINOPHIL NFR BLD: 4.2 % (ref 0–8)
ERYTHROCYTE [DISTWIDTH] IN BLOOD BY AUTOMATED COUNT: 15.3 % (ref 11.5–14.5)
EST. GFR  (AFRICAN AMERICAN): 55 ML/MIN/1.73 M^2
EST. GFR  (AFRICAN AMERICAN): 55 ML/MIN/1.73 M^2
EST. GFR  (NON AFRICAN AMERICAN): 48 ML/MIN/1.73 M^2
EST. GFR  (NON AFRICAN AMERICAN): 48 ML/MIN/1.73 M^2
GLUCOSE SERPL-MCNC: 168 MG/DL (ref 70–110)
GLUCOSE SERPL-MCNC: 168 MG/DL (ref 70–110)
HCT VFR BLD AUTO: 37.1 % (ref 40–54)
HGB BLD-MCNC: 11.9 G/DL (ref 14–18)
IMM GRANULOCYTES # BLD AUTO: 0.02 K/UL (ref 0–0.04)
IMM GRANULOCYTES NFR BLD AUTO: 0.4 % (ref 0–0.5)
LYMPHOCYTES # BLD AUTO: 1.8 K/UL (ref 1–4.8)
LYMPHOCYTES NFR BLD: 36.8 % (ref 18–48)
MCH RBC QN AUTO: 28.7 PG (ref 27–31)
MCHC RBC AUTO-ENTMCNC: 32.1 G/DL (ref 32–36)
MCV RBC AUTO: 90 FL (ref 82–98)
MONOCYTES # BLD AUTO: 0.4 K/UL (ref 0.3–1)
MONOCYTES NFR BLD: 8.6 % (ref 4–15)
NEUTROPHILS # BLD AUTO: 2.3 K/UL (ref 1.8–7.7)
NEUTROPHILS NFR BLD: 48.9 % (ref 38–73)
NRBC BLD-RTO: 0 /100 WBC
PLATELET # BLD AUTO: 185 K/UL (ref 150–350)
PMV BLD AUTO: 10.9 FL (ref 9.2–12.9)
POTASSIUM SERPL-SCNC: 3.9 MMOL/L (ref 3.5–5.1)
POTASSIUM SERPL-SCNC: 3.9 MMOL/L (ref 3.5–5.1)
PROT SERPL-MCNC: 6.4 G/DL (ref 6–8.4)
RBC # BLD AUTO: 4.14 M/UL (ref 4.6–6.2)
SODIUM SERPL-SCNC: 145 MMOL/L (ref 136–145)
SODIUM SERPL-SCNC: 145 MMOL/L (ref 136–145)
WBC # BLD AUTO: 4.76 K/UL (ref 3.9–12.7)

## 2020-12-21 PROCEDURE — 83036 HEMOGLOBIN GLYCOSYLATED A1C: CPT | Mod: HCNC

## 2020-12-21 PROCEDURE — 36415 COLL VENOUS BLD VENIPUNCTURE: CPT | Mod: HCNC,PO

## 2020-12-21 PROCEDURE — 80053 COMPREHEN METABOLIC PANEL: CPT | Mod: HCNC

## 2020-12-21 PROCEDURE — 85025 COMPLETE CBC W/AUTO DIFF WBC: CPT | Mod: HCNC

## 2020-12-22 LAB
ESTIMATED AVG GLUCOSE: 126 MG/DL (ref 68–131)
HBA1C MFR BLD HPLC: 6 % (ref 4–5.6)

## 2020-12-23 ENCOUNTER — OFFICE VISIT (OUTPATIENT)
Dept: TRANSPLANT | Facility: CLINIC | Age: 80
End: 2020-12-23
Payer: MEDICARE

## 2020-12-23 VITALS
HEART RATE: 87 BPM | OXYGEN SATURATION: 99 % | SYSTOLIC BLOOD PRESSURE: 146 MMHG | BODY MASS INDEX: 28.12 KG/M2 | HEIGHT: 75 IN | WEIGHT: 226.19 LBS | DIASTOLIC BLOOD PRESSURE: 72 MMHG

## 2020-12-23 DIAGNOSIS — E78.2 COMBINED HYPERLIPIDEMIA ASSOCIATED WITH TYPE 2 DIABETES MELLITUS: Chronic | ICD-10-CM

## 2020-12-23 DIAGNOSIS — E11.69 COMBINED HYPERLIPIDEMIA ASSOCIATED WITH TYPE 2 DIABETES MELLITUS: Chronic | ICD-10-CM

## 2020-12-23 DIAGNOSIS — I25.10 CORONARY ARTERY DISEASE, OCCLUSIVE: Primary | ICD-10-CM

## 2020-12-23 DIAGNOSIS — I10 ESSENTIAL HYPERTENSION: Chronic | ICD-10-CM

## 2020-12-23 PROCEDURE — 3072F PR LOW RISK FOR RETINOPATHY: ICD-10-PCS | Mod: HCNC,S$GLB,, | Performed by: NUCLEAR MEDICINE

## 2020-12-23 PROCEDURE — 99999 PR PBB SHADOW E&M-EST. PATIENT-LVL V: CPT | Mod: PBBFAC,HCNC,, | Performed by: NUCLEAR MEDICINE

## 2020-12-23 PROCEDURE — 3077F SYST BP >= 140 MM HG: CPT | Mod: HCNC,CPTII,S$GLB, | Performed by: NUCLEAR MEDICINE

## 2020-12-23 PROCEDURE — 3077F PR MOST RECENT SYSTOLIC BLOOD PRESSURE >= 140 MM HG: ICD-10-PCS | Mod: HCNC,CPTII,S$GLB, | Performed by: NUCLEAR MEDICINE

## 2020-12-23 PROCEDURE — 3072F LOW RISK FOR RETINOPATHY: CPT | Mod: HCNC,S$GLB,, | Performed by: NUCLEAR MEDICINE

## 2020-12-23 PROCEDURE — 99499 UNLISTED E&M SERVICE: CPT | Mod: S$GLB,,, | Performed by: NUCLEAR MEDICINE

## 2020-12-23 PROCEDURE — 99999 PR PBB SHADOW E&M-EST. PATIENT-LVL V: ICD-10-PCS | Mod: PBBFAC,HCNC,, | Performed by: NUCLEAR MEDICINE

## 2020-12-23 PROCEDURE — 99214 OFFICE O/P EST MOD 30 MIN: CPT | Mod: HCNC,S$GLB,, | Performed by: NUCLEAR MEDICINE

## 2020-12-23 PROCEDURE — 1159F MED LIST DOCD IN RCRD: CPT | Mod: HCNC,S$GLB,, | Performed by: NUCLEAR MEDICINE

## 2020-12-23 PROCEDURE — 3078F DIAST BP <80 MM HG: CPT | Mod: HCNC,CPTII,S$GLB, | Performed by: NUCLEAR MEDICINE

## 2020-12-23 PROCEDURE — 3078F PR MOST RECENT DIASTOLIC BLOOD PRESSURE < 80 MM HG: ICD-10-PCS | Mod: HCNC,CPTII,S$GLB, | Performed by: NUCLEAR MEDICINE

## 2020-12-23 PROCEDURE — 99214 PR OFFICE/OUTPT VISIT, EST, LEVL IV, 30-39 MIN: ICD-10-PCS | Mod: HCNC,S$GLB,, | Performed by: NUCLEAR MEDICINE

## 2020-12-23 PROCEDURE — 1159F PR MEDICATION LIST DOCUMENTED IN MEDICAL RECORD: ICD-10-PCS | Mod: HCNC,S$GLB,, | Performed by: NUCLEAR MEDICINE

## 2020-12-23 PROCEDURE — 99499 RISK ADDL DX/OHS AUDIT: ICD-10-PCS | Mod: S$GLB,,, | Performed by: NUCLEAR MEDICINE

## 2020-12-23 NOTE — PROGRESS NOTES
Subjective:   Patient ID:  Srinath Mcknight is a 80 y.o. male who presents for follow-up of Coronary Artery Disease (STABLE ANGINA FC 2), Hypertension (ESSENTIAL ), and Hyperlipidemia (MIXED- WITH DM T2)      HPI FEELING BETTER,  DAMON RESOLVED- NO ORTHOPNEA OR PND  NO RECURRENT CHEST DISCOMFORT- ANGINAL OR PLEURITIC  CARD MED GOOD COMPLIANCE, NO SIDE EFFECTS  NO ABDOMINAL DISCOMFORT  NO EDEMA. NO CALVE TENDERNESS  NO FOCAL CNS SYMPTOMS OR SIGNS TO SUGGEST TIA OR STROKE  NO INTERMITTENT CLAUDICATION  NO PALPITATIONS  NO NEAR SYNCOPE OR SYNCOPE    Review of Systems   Constitution: Negative for chills, fever, night sweats, weight gain and weight loss.   HENT: Negative for nosebleeds.    Eyes: Negative for blurred vision, double vision and visual disturbance.   Cardiovascular: Negative for chest pain, dyspnea on exertion, irregular heartbeat, leg swelling, orthopnea, palpitations, paroxysmal nocturnal dyspnea and syncope.   Respiratory: Negative for cough, hemoptysis and wheezing.    Endocrine: Negative for polydipsia and polyuria.   Hematologic/Lymphatic: Does not bruise/bleed easily.   Skin: Negative for rash.   Musculoskeletal: Negative for joint pain, joint swelling, muscle weakness and myalgias.   Gastrointestinal: Negative for abdominal pain, hematemesis, jaundice and melena.   Genitourinary: Negative for dysuria, hematuria and nocturia.   Neurological: Negative for dizziness, focal weakness, headaches, sensory change and weakness.   Psychiatric/Behavioral: Negative for depression. The patient does not have insomnia and is not nervous/anxious.      Family History   Problem Relation Age of Onset    Hypertension Mother     Heart disease Mother     Diabetes Mother     Hypertension Father     Heart disease Father     Diabetes Father     Stroke Father     Diabetes Sister     Cancer Brother 50        pancreas    Drug abuse Brother     Prostate cancer Neg Hx     Macular degeneration Neg Hx     Retinal  detachment Neg Hx     Strabismus Neg Hx     Glaucoma Neg Hx     Blindness Neg Hx     Amblyopia Neg Hx     Kidney disease Neg Hx     Mental illness Neg Hx     Mental retardation Neg Hx     COPD Neg Hx     Asthma Neg Hx      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    Trouble in sleeping     Type 2 diabetes mellitus with diabetic polyneuropathy, without long-term current use of insulin     Urinary incontinence     Uses hearing aid     bilat     Social History     Socioeconomic History    Marital status:      Spouse name: Not on file    Number of children: 0    Years of education: Not on file    Highest education level: Master's degree (e.g., MA, MS, Olegario, MEd, MSW, CARMEL)   Occupational History    Occupation: retired     Comment: teacher   Social Needs    Financial resource strain: Not hard at all    Food insecurity     Worry: Never true     Inability: Never true    Transportation needs     Medical: No     Non-medical: No   Tobacco Use    Smoking status: Never Smoker    Smokeless tobacco: Never Used   Substance and Sexual Activity    Alcohol use: Yes     Alcohol/week: 1.0 standard drinks     Types: 1 Glasses of wine per week     Comment: rarely  No alcohol prior to surgery    Drug use: No    Sexual activity: Yes     Partners: Female     Birth control/protection: Condom   Lifestyle    Physical activity     Days per week: 2 days     Minutes per  session: 10 min    Stress: Not at all   Relationships    Social connections     Talks on phone: Three times a week     Gets together: Once a week     Attends Shinto service: More than 4 times per year     Active member of club or organization: Yes     Attends meetings of clubs or organizations: More than 4 times per year     Relationship status:    Other Topics Concern    Not on file   Social History Narrative    Single lives alone. No children. 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 Caffeine intake - sugar free cola, Rare coffee intake. Still drives. Does have a Living Will . Has a nephew Jt Gayle, lives in [place]. Has a friend Karina Rossi, locally who could help him.      Current Outpatient Medications on File Prior to Visit   Medication Sig Dispense Refill    allopurinoL (ZYLOPRIM) 100 MG tablet TAKE 1 TABLET EVERY DAY 90 tablet 3    aspirin (ECOTRIN) 81 MG EC tablet Take 1 tablet (81 mg total) by mouth once daily.  0    bisacodyl (DULCOLAX) 10 mg Supp   0    cyanocobalamin, vitamin B-12, 1,000 mcg Subl Place 1,000 mcg under the tongue once daily. 90 tablet 3    docusate sodium (COLACE) 100 MG capsule Take 1 capsule (100 mg total) by mouth 2 (two) times daily as needed. (Patient taking differently: Take 100 mg by mouth 2 (two) times daily. Per [facility]) 60 capsule 0    empagliflozin (JARDIANCE) 10 mg tablet Take 1 tablet (10 mg total) by mouth once daily. 30 tablet 3    ferrous sulfate 324 mg (65 mg iron) TbEC Take 1 tablet (324 mg total) by mouth once daily.      fluticasone propionate (FLONASE) 50 mcg/actuation nasal spray SHAKE LIQUID AND USE 1 SPRAY(50 MCG) IN EACH NOSTRIL EVERY DAY 48 g 0    gabapentin (NEURONTIN) 600 MG tablet Take 1 tablet (600 mg total) by mouth 2 (two) times daily. 180 tablet 0    GARLIC EXTRACT ORAL Take 1 tablet by mouth once daily. Per [facility]      losartan (COZAAR) 50 MG tablet Take 50 mg  "by mouth 2 (two) times a day.      MELATONIN ORAL Take by mouth every evening.      metFORMIN (GLUCOPHAGE) 1000 MG tablet TAKE 1 TABLET TWICE DAILY WITH MEALS 180 tablet 3    metoprolol succinate (TOPROL-XL) 50 MG 24 hr tablet TAKE 1 TABLET EVERY DAY 90 tablet 3    multivitamin (ONE DAILY MULTIVITAMIN) per tablet Take 1 tablet by mouth once daily.      papaverine 30 mg/mL injection Inject 0.3 ml of trimix solution prn ED max once daily  Prepare 10ml of trimix solution containing:    Papaverine 30mg/ml    Phentolamine 1 mg/ml    Alprostadil 10mcg/ml  Dispense 10ml per refill  Qs syringes 1cc/30g/0.5" and alcohol swabs dispense as needed for Intracavernosal injection 10 mL 5    pravastatin (PRAVACHOL) 40 MG tablet TAKE 1 TABLET EVERY DAY 90 tablet 3    ACCU-CHEK FRANKO PLUS TEST STRP Strp TEST BLOOD SUGAR EVERY  strip 0    ACCU-CHEK SOFTCLIX LANCETS Misc TEST ONE TIME DAILY 100 each 11    blood-glucose meter (ACCU-CHEK FRANKO PLUS METER) Misc 1 Device by Misc.(Non-Drug; Combo Route) route once daily. 1 each 0    diclofenac sodium (VOLTAREN) 1 % Gel Apply 2 g topically 4 (four) times daily. 100 g 5    finasteride (PROSCAR) 5 mg tablet Take 1 tablet (5 mg total) by mouth once daily. (Patient not taking: Reported on 11/18/2020) 30 tablet 6    sildenafiL (VIAGRA) 100 MG tablet Take 1 tablet (100 mg total) by mouth daily as needed for Erectile Dysfunction. 30 tablet 2    tadalafil (CIALIS) 5 MG tablet Take 1 tablet (5 mg total) by mouth daily as needed for Erectile Dysfunction. (Patient not taking: Reported on 12/23/2020) 30 tablet 0     No current facility-administered medications on file prior to visit.      Review of patient's allergies indicates:   Allergen Reactions    Sulfa (sulfonamide antibiotics)      Can't recall from 1995       Objective:     Physical Exam   Constitutional: He is oriented to person, place, and time. He appears well-developed. No distress.   HENT:   Head: Normocephalic.   Eyes: " Pupils are equal, round, and reactive to light. Conjunctivae are normal.   Neck: Neck supple. No JVD present. No thyromegaly present.   Cardiovascular: Normal rate, regular rhythm, normal heart sounds and intact distal pulses. Exam reveals no gallop and no friction rub.   No murmur heard.  Pulses:       Carotid pulses are 2+ on the right side and 2+ on the left side.       Radial pulses are 2+ on the right side and 2+ on the left side.        Femoral pulses are 2+ on the right side and 2+ on the left side.       Popliteal pulses are 2+ on the right side and 2+ on the left side.        Dorsalis pedis pulses are 2+ on the right side and 2+ on the left side.        Posterior tibial pulses are 2+ on the right side and 2+ on the left side.   Pulmonary/Chest: Breath sounds normal. He has no wheezes. He has no rales. He exhibits no tenderness.   Abdominal: Soft. Bowel sounds are normal. He exhibits no mass. There is no hepatosplenomegaly. There is no abdominal tenderness.   Musculoskeletal:         General: No tenderness or edema.      Cervical back: Normal.      Thoracic back: Normal.      Lumbar back: Normal.   Lymphadenopathy:     He has no cervical adenopathy.     He has no axillary adenopathy.        Right: No supraclavicular adenopathy present.        Left: No supraclavicular adenopathy present.   Neurological: He is alert and oriented to person, place, and time. He has normal strength. No sensory deficit. Gait normal.   Skin: Skin is warm. No cyanosis. No pallor. Nails show no clubbing.   Psychiatric: He has a normal mood and affect. His speech is normal and behavior is normal. Cognition and memory are normal.       Assessment:     1. Coronary artery disease, occlusive    2. Essential hypertension    3. Combined hyperlipidemia associated with type 2 diabetes mellitus        Plan:     Coronary artery disease, occlusive  NO CLINICAL EVIDENCE OF ACTIVE MYOCARDIAL ISCHEMIA  NO ARRHYTHMIAS  NO ADHF  CARD MED WELL  TOLERATED    Essential hypertension  BP WELL CONTROLLED  CNS STATUS STABLE    Combined hyperlipidemia associated with type 2 diabetes mellitus  STABLE    CONTINUE PRESENT CARD MANAGEMENT  RETURN IN 3 MONTHS.

## 2021-01-09 ENCOUNTER — IMMUNIZATION (OUTPATIENT)
Dept: INTERNAL MEDICINE | Facility: CLINIC | Age: 81
End: 2021-01-09
Payer: MEDICARE

## 2021-01-09 DIAGNOSIS — Z23 NEED FOR VACCINATION: ICD-10-CM

## 2021-01-09 PROCEDURE — 91300 COVID-19, MRNA, LNP-S, PF, 30 MCG/0.3 ML DOSE VACCINE: CPT | Mod: PBBFAC | Performed by: FAMILY MEDICINE

## 2021-01-12 ENCOUNTER — OFFICE VISIT (OUTPATIENT)
Dept: URGENT CARE | Facility: CLINIC | Age: 81
End: 2021-01-12
Payer: MEDICARE

## 2021-01-12 VITALS
OXYGEN SATURATION: 99 % | HEART RATE: 71 BPM | TEMPERATURE: 97 F | WEIGHT: 220 LBS | RESPIRATION RATE: 18 BRPM | HEIGHT: 75 IN | DIASTOLIC BLOOD PRESSURE: 60 MMHG | SYSTOLIC BLOOD PRESSURE: 111 MMHG | BODY MASS INDEX: 27.35 KG/M2

## 2021-01-12 DIAGNOSIS — R35.89 POLYURIA: ICD-10-CM

## 2021-01-12 DIAGNOSIS — R35.0 URINE FREQUENCY: Primary | ICD-10-CM

## 2021-01-12 LAB
BILIRUB UR QL STRIP: NEGATIVE
GLUCOSE SERPL-MCNC: 121 MG/DL (ref 70–110)
GLUCOSE UR QL STRIP: POSITIVE
KETONES UR QL STRIP: NEGATIVE
LEUKOCYTE ESTERASE UR QL STRIP: NEGATIVE
PH, POC UA: 5
POC BLOOD, URINE: NEGATIVE
POC NITRATES, URINE: NEGATIVE
PROT UR QL STRIP: NEGATIVE
SP GR UR STRIP: 1.01 (ref 1–1.03)
UROBILINOGEN UR STRIP-ACNC: ABNORMAL (ref 0.3–2.2)

## 2021-01-12 PROCEDURE — 3072F LOW RISK FOR RETINOPATHY: CPT | Mod: S$GLB,,, | Performed by: EMERGENCY MEDICINE

## 2021-01-12 PROCEDURE — 3072F PR LOW RISK FOR RETINOPATHY: ICD-10-PCS | Mod: S$GLB,,, | Performed by: EMERGENCY MEDICINE

## 2021-01-12 PROCEDURE — 82962 GLUCOSE BLOOD TEST: CPT | Mod: S$GLB,,, | Performed by: EMERGENCY MEDICINE

## 2021-01-12 PROCEDURE — 82962 POCT GLUCOSE, HAND-HELD DEVICE: ICD-10-PCS | Mod: S$GLB,,, | Performed by: EMERGENCY MEDICINE

## 2021-01-12 PROCEDURE — 99213 PR OFFICE/OUTPT VISIT, EST, LEVL III, 20-29 MIN: ICD-10-PCS | Mod: 25,S$GLB,, | Performed by: EMERGENCY MEDICINE

## 2021-01-12 PROCEDURE — 81003 URINALYSIS AUTO W/O SCOPE: CPT | Mod: QW,S$GLB,, | Performed by: EMERGENCY MEDICINE

## 2021-01-12 PROCEDURE — 99213 OFFICE O/P EST LOW 20 MIN: CPT | Mod: 25,S$GLB,, | Performed by: EMERGENCY MEDICINE

## 2021-01-12 PROCEDURE — 81003 POCT URINALYSIS, DIPSTICK, AUTOMATED, W/O SCOPE: ICD-10-PCS | Mod: QW,S$GLB,, | Performed by: EMERGENCY MEDICINE

## 2021-01-12 RX ORDER — TAMSULOSIN HYDROCHLORIDE 0.4 MG/1
0.4 CAPSULE ORAL DAILY
COMMUNITY
End: 2021-01-12 | Stop reason: SDUPTHER

## 2021-01-12 RX ORDER — TAMSULOSIN HYDROCHLORIDE 0.4 MG/1
0.4 CAPSULE ORAL DAILY
Qty: 14 CAPSULE | Refills: 0 | Status: SHIPPED | OUTPATIENT
Start: 2021-01-12 | End: 2021-01-25 | Stop reason: SDUPTHER

## 2021-01-25 DIAGNOSIS — I10 ESSENTIAL HYPERTENSION: Primary | ICD-10-CM

## 2021-01-25 DIAGNOSIS — R35.0 URINE FREQUENCY: ICD-10-CM

## 2021-01-25 RX ORDER — LOSARTAN POTASSIUM 50 MG/1
50 TABLET ORAL 2 TIMES DAILY
Qty: 60 TABLET | Refills: 11 | Status: SHIPPED | OUTPATIENT
Start: 2021-01-25 | End: 2021-12-01

## 2021-01-25 RX ORDER — TAMSULOSIN HYDROCHLORIDE 0.4 MG/1
0.4 CAPSULE ORAL DAILY
Qty: 14 CAPSULE | Refills: 0 | Status: SHIPPED | OUTPATIENT
Start: 2021-01-25 | End: 2021-02-08

## 2021-01-27 ENCOUNTER — TELEPHONE (OUTPATIENT)
Dept: PULMONOLOGY | Facility: CLINIC | Age: 81
End: 2021-01-27

## 2021-01-27 DIAGNOSIS — R05.9 COUGH: Primary | ICD-10-CM

## 2021-01-30 ENCOUNTER — IMMUNIZATION (OUTPATIENT)
Dept: INTERNAL MEDICINE | Facility: CLINIC | Age: 81
End: 2021-01-30
Payer: MEDICARE

## 2021-01-30 DIAGNOSIS — Z23 NEED FOR VACCINATION: Primary | ICD-10-CM

## 2021-01-30 PROCEDURE — 91300 COVID-19, MRNA, LNP-S, PF, 30 MCG/0.3 ML DOSE VACCINE: CPT | Mod: PBBFAC | Performed by: FAMILY MEDICINE

## 2021-01-30 PROCEDURE — 0002A COVID-19, MRNA, LNP-S, PF, 30 MCG/0.3 ML DOSE VACCINE: CPT | Mod: PBBFAC | Performed by: FAMILY MEDICINE

## 2021-02-02 ENCOUNTER — TELEPHONE (OUTPATIENT)
Dept: PULMONOLOGY | Facility: CLINIC | Age: 81
End: 2021-02-02

## 2021-02-17 ENCOUNTER — OFFICE VISIT (OUTPATIENT)
Dept: INTERNAL MEDICINE | Facility: CLINIC | Age: 81
End: 2021-02-17
Payer: MEDICARE

## 2021-02-17 VITALS
WEIGHT: 226 LBS | SYSTOLIC BLOOD PRESSURE: 120 MMHG | BODY MASS INDEX: 28.1 KG/M2 | HEART RATE: 90 BPM | TEMPERATURE: 97 F | DIASTOLIC BLOOD PRESSURE: 62 MMHG | HEIGHT: 75 IN

## 2021-02-17 DIAGNOSIS — E11.69 COMBINED HYPERLIPIDEMIA ASSOCIATED WITH TYPE 2 DIABETES MELLITUS: Primary | ICD-10-CM

## 2021-02-17 DIAGNOSIS — I10 ESSENTIAL HYPERTENSION: ICD-10-CM

## 2021-02-17 DIAGNOSIS — E11.51 TYPE 2 DIABETES MELLITUS WITH DIABETIC PERIPHERAL ANGIOPATHY WITHOUT GANGRENE, WITHOUT LONG-TERM CURRENT USE OF INSULIN: ICD-10-CM

## 2021-02-17 DIAGNOSIS — R35.0 URINE FREQUENCY: ICD-10-CM

## 2021-02-17 DIAGNOSIS — E78.2 COMBINED HYPERLIPIDEMIA ASSOCIATED WITH TYPE 2 DIABETES MELLITUS: Primary | ICD-10-CM

## 2021-02-17 PROCEDURE — 3288F PR FALLS RISK ASSESSMENT DOCUMENTED: ICD-10-PCS | Mod: CPTII,S$GLB,, | Performed by: PHYSICIAN ASSISTANT

## 2021-02-17 PROCEDURE — 3074F PR MOST RECENT SYSTOLIC BLOOD PRESSURE < 130 MM HG: ICD-10-PCS | Mod: CPTII,S$GLB,, | Performed by: PHYSICIAN ASSISTANT

## 2021-02-17 PROCEDURE — 1126F AMNT PAIN NOTED NONE PRSNT: CPT | Mod: S$GLB,,, | Performed by: PHYSICIAN ASSISTANT

## 2021-02-17 PROCEDURE — 99499 RISK ADDL DX/OHS AUDIT: ICD-10-PCS | Mod: HCNC,S$GLB,, | Performed by: PHYSICIAN ASSISTANT

## 2021-02-17 PROCEDURE — 1126F PR PAIN SEVERITY QUANTIFIED, NO PAIN PRESENT: ICD-10-PCS | Mod: S$GLB,,, | Performed by: PHYSICIAN ASSISTANT

## 2021-02-17 PROCEDURE — 99214 PR OFFICE/OUTPT VISIT, EST, LEVL IV, 30-39 MIN: ICD-10-PCS | Mod: S$GLB,,, | Performed by: PHYSICIAN ASSISTANT

## 2021-02-17 PROCEDURE — 1101F PR PT FALLS ASSESS DOC 0-1 FALLS W/OUT INJ PAST YR: ICD-10-PCS | Mod: CPTII,S$GLB,, | Performed by: PHYSICIAN ASSISTANT

## 2021-02-17 PROCEDURE — 3288F FALL RISK ASSESSMENT DOCD: CPT | Mod: CPTII,S$GLB,, | Performed by: PHYSICIAN ASSISTANT

## 2021-02-17 PROCEDURE — 1101F PT FALLS ASSESS-DOCD LE1/YR: CPT | Mod: CPTII,S$GLB,, | Performed by: PHYSICIAN ASSISTANT

## 2021-02-17 PROCEDURE — 1159F PR MEDICATION LIST DOCUMENTED IN MEDICAL RECORD: ICD-10-PCS | Mod: S$GLB,,, | Performed by: PHYSICIAN ASSISTANT

## 2021-02-17 PROCEDURE — 99214 OFFICE O/P EST MOD 30 MIN: CPT | Mod: S$GLB,,, | Performed by: PHYSICIAN ASSISTANT

## 2021-02-17 PROCEDURE — 99999 PR PBB SHADOW E&M-EST. PATIENT-LVL IV: ICD-10-PCS | Mod: PBBFAC,,, | Performed by: PHYSICIAN ASSISTANT

## 2021-02-17 PROCEDURE — 3074F SYST BP LT 130 MM HG: CPT | Mod: CPTII,S$GLB,, | Performed by: PHYSICIAN ASSISTANT

## 2021-02-17 PROCEDURE — 3072F LOW RISK FOR RETINOPATHY: CPT | Mod: S$GLB,,, | Performed by: PHYSICIAN ASSISTANT

## 2021-02-17 PROCEDURE — 3078F PR MOST RECENT DIASTOLIC BLOOD PRESSURE < 80 MM HG: ICD-10-PCS | Mod: CPTII,S$GLB,, | Performed by: PHYSICIAN ASSISTANT

## 2021-02-17 PROCEDURE — 3072F PR LOW RISK FOR RETINOPATHY: ICD-10-PCS | Mod: S$GLB,,, | Performed by: PHYSICIAN ASSISTANT

## 2021-02-17 PROCEDURE — 99499 UNLISTED E&M SERVICE: CPT | Mod: HCNC,S$GLB,, | Performed by: PHYSICIAN ASSISTANT

## 2021-02-17 PROCEDURE — 99999 PR PBB SHADOW E&M-EST. PATIENT-LVL IV: CPT | Mod: PBBFAC,,, | Performed by: PHYSICIAN ASSISTANT

## 2021-02-17 PROCEDURE — 3078F DIAST BP <80 MM HG: CPT | Mod: CPTII,S$GLB,, | Performed by: PHYSICIAN ASSISTANT

## 2021-02-17 PROCEDURE — 1159F MED LIST DOCD IN RCRD: CPT | Mod: S$GLB,,, | Performed by: PHYSICIAN ASSISTANT

## 2021-02-17 RX ORDER — TAMSULOSIN HYDROCHLORIDE 0.4 MG/1
0.4 CAPSULE ORAL DAILY
Qty: 90 CAPSULE | Refills: 3 | Status: SHIPPED | OUTPATIENT
Start: 2021-02-17 | End: 2021-09-15

## 2021-02-17 RX ORDER — CETIRIZINE HYDROCHLORIDE 10 MG/1
10 TABLET ORAL DAILY
Qty: 14 TABLET | Refills: 0 | Status: SHIPPED | OUTPATIENT
Start: 2021-02-17 | End: 2022-09-20 | Stop reason: SDUPTHER

## 2021-02-25 RX ORDER — BLOOD SUGAR DIAGNOSTIC
STRIP MISCELLANEOUS
Qty: 100 STRIP | Refills: 11 | Status: SHIPPED | OUTPATIENT
Start: 2021-02-25 | End: 2022-07-28

## 2021-04-13 ENCOUNTER — TELEPHONE (OUTPATIENT)
Dept: PULMONOLOGY | Facility: CLINIC | Age: 81
End: 2021-04-13

## 2021-04-21 DIAGNOSIS — E11.42 TYPE 2 DIABETES MELLITUS WITH DIABETIC POLYNEUROPATHY, WITHOUT LONG-TERM CURRENT USE OF INSULIN: ICD-10-CM

## 2021-04-21 RX ORDER — GABAPENTIN 600 MG/1
600 TABLET ORAL 2 TIMES DAILY
Qty: 180 TABLET | Refills: 0 | Status: SHIPPED | OUTPATIENT
Start: 2021-04-21 | End: 2021-05-21

## 2021-04-29 ENCOUNTER — HOSPITAL ENCOUNTER (OUTPATIENT)
Dept: RADIOLOGY | Facility: HOSPITAL | Age: 81
Discharge: HOME | End: 2021-04-29
Attending: SURGERY
Payer: MEDICARE

## 2021-04-29 ENCOUNTER — OFFICE VISIT (OUTPATIENT)
Dept: SURGERY | Facility: CLINIC | Age: 81
End: 2021-04-29
Payer: MEDICARE

## 2021-04-29 DIAGNOSIS — M79.89 SWELLING OF HAND, UNSPECIFIED LATERALITY: ICD-10-CM

## 2021-04-29 DIAGNOSIS — M79.89 SWELLING OF HAND, UNSPECIFIED LATERALITY: Primary | ICD-10-CM

## 2021-04-29 DIAGNOSIS — M25.439 SWELLING OF WRIST, UNSPECIFIED LATERALITY: ICD-10-CM

## 2021-04-29 PROCEDURE — 73110 XR WRIST COMPLETE 3 VIEWS RIGHT: ICD-10-PCS | Mod: 26,RT,, | Performed by: RADIOLOGY

## 2021-04-29 PROCEDURE — 73110 X-RAY EXAM OF WRIST: CPT | Mod: 26,RT,, | Performed by: RADIOLOGY

## 2021-04-29 PROCEDURE — 99999 PR PBB SHADOW E&M-EST. PATIENT-LVL III: CPT | Mod: PBBFAC,,, | Performed by: SURGERY

## 2021-04-29 PROCEDURE — 99999 PR PBB SHADOW E&M-EST. PATIENT-LVL III: ICD-10-PCS | Mod: PBBFAC,,, | Performed by: SURGERY

## 2021-04-29 PROCEDURE — 99499 UNLISTED E&M SERVICE: CPT | Mod: S$GLB,,, | Performed by: SURGERY

## 2021-04-29 PROCEDURE — 3072F LOW RISK FOR RETINOPATHY: CPT | Mod: S$GLB,,, | Performed by: SURGERY

## 2021-04-29 PROCEDURE — 99499 NO LOS: ICD-10-PCS | Mod: S$GLB,,, | Performed by: SURGERY

## 2021-04-29 PROCEDURE — 3072F PR LOW RISK FOR RETINOPATHY: ICD-10-PCS | Mod: S$GLB,,, | Performed by: SURGERY

## 2021-04-29 PROCEDURE — 73110 X-RAY EXAM OF WRIST: CPT | Mod: TC,RT

## 2021-05-03 ENCOUNTER — PATIENT MESSAGE (OUTPATIENT)
Dept: SURGERY | Facility: CLINIC | Age: 81
End: 2021-05-03

## 2021-05-10 ENCOUNTER — LAB VISIT (OUTPATIENT)
Dept: LAB | Facility: HOSPITAL | Age: 81
End: 2021-05-10
Attending: PHYSICIAN ASSISTANT
Payer: MEDICARE

## 2021-05-10 DIAGNOSIS — E11.51 TYPE 2 DIABETES MELLITUS WITH DIABETIC PERIPHERAL ANGIOPATHY WITHOUT GANGRENE, WITHOUT LONG-TERM CURRENT USE OF INSULIN: ICD-10-CM

## 2021-05-10 PROCEDURE — 36415 COLL VENOUS BLD VENIPUNCTURE: CPT | Mod: PO | Performed by: PHYSICIAN ASSISTANT

## 2021-05-10 PROCEDURE — 83036 HEMOGLOBIN GLYCOSYLATED A1C: CPT | Performed by: PHYSICIAN ASSISTANT

## 2021-05-11 ENCOUNTER — OFFICE VISIT (OUTPATIENT)
Dept: ORTHOPEDICS | Facility: CLINIC | Age: 81
End: 2021-05-11
Payer: MEDICARE

## 2021-05-11 ENCOUNTER — HOSPITAL ENCOUNTER (OUTPATIENT)
Dept: RADIOLOGY | Facility: HOSPITAL | Age: 81
Discharge: HOME | End: 2021-05-11
Attending: ORTHOPAEDIC SURGERY
Payer: MEDICARE

## 2021-05-11 VITALS
HEIGHT: 75 IN | HEART RATE: 70 BPM | SYSTOLIC BLOOD PRESSURE: 124 MMHG | BODY MASS INDEX: 27.98 KG/M2 | WEIGHT: 225 LBS | DIASTOLIC BLOOD PRESSURE: 73 MMHG

## 2021-05-11 DIAGNOSIS — M25.562 PAIN IN BOTH KNEES, UNSPECIFIED CHRONICITY: ICD-10-CM

## 2021-05-11 DIAGNOSIS — M25.562 PAIN IN BOTH KNEES, UNSPECIFIED CHRONICITY: Primary | ICD-10-CM

## 2021-05-11 DIAGNOSIS — M25.561 PAIN IN BOTH KNEES, UNSPECIFIED CHRONICITY: ICD-10-CM

## 2021-05-11 DIAGNOSIS — M79.89 SWELLING OF HAND, UNSPECIFIED LATERALITY: ICD-10-CM

## 2021-05-11 DIAGNOSIS — M19.131 SLAC (SCAPHOLUNATE ADVANCED COLLAPSE) OF WRIST, RIGHT: Primary | ICD-10-CM

## 2021-05-11 DIAGNOSIS — M25.561 PAIN IN BOTH KNEES, UNSPECIFIED CHRONICITY: Primary | ICD-10-CM

## 2021-05-11 DIAGNOSIS — M25.439 SWELLING OF WRIST, UNSPECIFIED LATERALITY: ICD-10-CM

## 2021-05-11 LAB
ESTIMATED AVG GLUCOSE: 123 MG/DL (ref 68–131)
HBA1C MFR BLD: 5.9 % (ref 4–5.6)

## 2021-05-11 PROCEDURE — 3288F FALL RISK ASSESSMENT DOCD: CPT | Mod: CPTII,S$GLB,, | Performed by: ORTHOPAEDIC SURGERY

## 2021-05-11 PROCEDURE — 99203 PR OFFICE/OUTPT VISIT, NEW, LEVL III, 30-44 MIN: ICD-10-PCS | Mod: S$GLB,,, | Performed by: ORTHOPAEDIC SURGERY

## 2021-05-11 PROCEDURE — 1159F PR MEDICATION LIST DOCUMENTED IN MEDICAL RECORD: ICD-10-PCS | Mod: S$GLB,,, | Performed by: ORTHOPAEDIC SURGERY

## 2021-05-11 PROCEDURE — 1101F PR PT FALLS ASSESS DOC 0-1 FALLS W/OUT INJ PAST YR: ICD-10-PCS | Mod: CPTII,S$GLB,, | Performed by: ORTHOPAEDIC SURGERY

## 2021-05-11 PROCEDURE — 1125F PR PAIN SEVERITY QUANTIFIED, PAIN PRESENT: ICD-10-PCS | Mod: S$GLB,,, | Performed by: ORTHOPAEDIC SURGERY

## 2021-05-11 PROCEDURE — 3072F PR LOW RISK FOR RETINOPATHY: ICD-10-PCS | Mod: S$GLB,,, | Performed by: ORTHOPAEDIC SURGERY

## 2021-05-11 PROCEDURE — 3288F PR FALLS RISK ASSESSMENT DOCUMENTED: ICD-10-PCS | Mod: CPTII,S$GLB,, | Performed by: ORTHOPAEDIC SURGERY

## 2021-05-11 PROCEDURE — 99203 OFFICE O/P NEW LOW 30 MIN: CPT | Mod: S$GLB,,, | Performed by: ORTHOPAEDIC SURGERY

## 2021-05-11 PROCEDURE — 1125F AMNT PAIN NOTED PAIN PRSNT: CPT | Mod: S$GLB,,, | Performed by: ORTHOPAEDIC SURGERY

## 2021-05-11 PROCEDURE — 3072F LOW RISK FOR RETINOPATHY: CPT | Mod: S$GLB,,, | Performed by: ORTHOPAEDIC SURGERY

## 2021-05-11 PROCEDURE — 73564 XR KNEE ORTHO BILAT WITH FLEXION: ICD-10-PCS | Mod: 26,50,, | Performed by: RADIOLOGY

## 2021-05-11 PROCEDURE — 1159F MED LIST DOCD IN RCRD: CPT | Mod: S$GLB,,, | Performed by: ORTHOPAEDIC SURGERY

## 2021-05-11 PROCEDURE — 99999 PR PBB SHADOW E&M-EST. PATIENT-LVL V: ICD-10-PCS | Mod: PBBFAC,,, | Performed by: ORTHOPAEDIC SURGERY

## 2021-05-11 PROCEDURE — 99999 PR PBB SHADOW E&M-EST. PATIENT-LVL V: CPT | Mod: PBBFAC,,, | Performed by: ORTHOPAEDIC SURGERY

## 2021-05-11 PROCEDURE — 73564 X-RAY EXAM KNEE 4 OR MORE: CPT | Mod: 26,50,, | Performed by: RADIOLOGY

## 2021-05-11 PROCEDURE — 1101F PT FALLS ASSESS-DOCD LE1/YR: CPT | Mod: CPTII,S$GLB,, | Performed by: ORTHOPAEDIC SURGERY

## 2021-05-11 PROCEDURE — 73564 X-RAY EXAM KNEE 4 OR MORE: CPT | Mod: TC,50

## 2021-05-12 ENCOUNTER — TELEPHONE (OUTPATIENT)
Dept: UROLOGY | Facility: CLINIC | Age: 81
End: 2021-05-12

## 2021-05-12 ENCOUNTER — PATIENT MESSAGE (OUTPATIENT)
Dept: UROLOGY | Facility: CLINIC | Age: 81
End: 2021-05-12

## 2021-06-03 ENCOUNTER — PATIENT OUTREACH (OUTPATIENT)
Dept: ADMINISTRATIVE | Facility: OTHER | Age: 81
End: 2021-06-03

## 2021-06-04 ENCOUNTER — OFFICE VISIT (OUTPATIENT)
Dept: OPHTHALMOLOGY | Facility: CLINIC | Age: 81
End: 2021-06-04
Payer: COMMERCIAL

## 2021-06-04 DIAGNOSIS — E11.36 DIABETIC CATARACT: ICD-10-CM

## 2021-06-04 DIAGNOSIS — I10 ESSENTIAL HYPERTENSION: ICD-10-CM

## 2021-06-04 DIAGNOSIS — E11.9 DIABETES MELLITUS TYPE 2 WITHOUT RETINOPATHY: Primary | ICD-10-CM

## 2021-06-04 DIAGNOSIS — H52.4 BILATERAL PRESBYOPIA: ICD-10-CM

## 2021-06-04 PROCEDURE — 99999 PR PBB SHADOW E&M-EST. PATIENT-LVL III: ICD-10-PCS | Mod: PBBFAC,,, | Performed by: OPTOMETRIST

## 2021-06-04 PROCEDURE — 92015 DETERMINE REFRACTIVE STATE: CPT | Mod: S$GLB,,, | Performed by: OPTOMETRIST

## 2021-06-04 PROCEDURE — 92015 PR REFRACTION: ICD-10-PCS | Mod: S$GLB,,, | Performed by: OPTOMETRIST

## 2021-06-04 PROCEDURE — 99999 PR PBB SHADOW E&M-EST. PATIENT-LVL III: CPT | Mod: PBBFAC,,, | Performed by: OPTOMETRIST

## 2021-06-04 PROCEDURE — 92014 PR EYE EXAM, EST PATIENT,COMPREHESV: ICD-10-PCS | Mod: S$GLB,,, | Performed by: OPTOMETRIST

## 2021-06-04 PROCEDURE — 92014 COMPRE OPH EXAM EST PT 1/>: CPT | Mod: S$GLB,,, | Performed by: OPTOMETRIST

## 2021-06-15 ENCOUNTER — PES CALL (OUTPATIENT)
Dept: ADMINISTRATIVE | Facility: CLINIC | Age: 81
End: 2021-06-15

## 2021-06-22 ENCOUNTER — LAB VISIT (OUTPATIENT)
Dept: LAB | Facility: HOSPITAL | Age: 81
End: 2021-06-22
Attending: INTERNAL MEDICINE
Payer: MEDICARE

## 2021-06-22 DIAGNOSIS — J30.89 SEASONAL ALLERGIC RHINITIS DUE TO OTHER ALLERGIC TRIGGER: ICD-10-CM

## 2021-06-22 LAB
ANION GAP SERPL CALC-SCNC: 13 MMOL/L (ref 8–16)
BASOPHILS # BLD AUTO: 0.04 K/UL (ref 0–0.2)
BASOPHILS NFR BLD: 0.9 % (ref 0–1.9)
BUN SERPL-MCNC: 24 MG/DL (ref 8–23)
CALCIUM SERPL-MCNC: 8.9 MG/DL (ref 8.7–10.5)
CHLORIDE SERPL-SCNC: 110 MMOL/L (ref 95–110)
CO2 SERPL-SCNC: 22 MMOL/L (ref 23–29)
CREAT SERPL-MCNC: 1.6 MG/DL (ref 0.5–1.4)
DIFFERENTIAL METHOD: ABNORMAL
EOSINOPHIL # BLD AUTO: 0.3 K/UL (ref 0–0.5)
EOSINOPHIL NFR BLD: 6.5 % (ref 0–8)
ERYTHROCYTE [DISTWIDTH] IN BLOOD BY AUTOMATED COUNT: 14.5 % (ref 11.5–14.5)
EST. GFR  (AFRICAN AMERICAN): 47 ML/MIN/1.73 M^2
EST. GFR  (NON AFRICAN AMERICAN): 41 ML/MIN/1.73 M^2
GLUCOSE SERPL-MCNC: 104 MG/DL (ref 70–110)
HCT VFR BLD AUTO: 38.7 % (ref 40–54)
HGB BLD-MCNC: 12.7 G/DL (ref 14–18)
IMM GRANULOCYTES # BLD AUTO: 0.01 K/UL (ref 0–0.04)
IMM GRANULOCYTES NFR BLD AUTO: 0.2 % (ref 0–0.5)
LYMPHOCYTES # BLD AUTO: 1.9 K/UL (ref 1–4.8)
LYMPHOCYTES NFR BLD: 42 % (ref 18–48)
MCH RBC QN AUTO: 28.9 PG (ref 27–31)
MCHC RBC AUTO-ENTMCNC: 32.8 G/DL (ref 32–36)
MCV RBC AUTO: 88 FL (ref 82–98)
MONOCYTES # BLD AUTO: 0.4 K/UL (ref 0.3–1)
MONOCYTES NFR BLD: 9.4 % (ref 4–15)
NEUTROPHILS # BLD AUTO: 1.9 K/UL (ref 1.8–7.7)
NEUTROPHILS NFR BLD: 41 % (ref 38–73)
NRBC BLD-RTO: 0 /100 WBC
PLATELET # BLD AUTO: 178 K/UL (ref 150–450)
PMV BLD AUTO: 9.9 FL (ref 9.2–12.9)
POTASSIUM SERPL-SCNC: 4.4 MMOL/L (ref 3.5–5.1)
RBC # BLD AUTO: 4.4 M/UL (ref 4.6–6.2)
SODIUM SERPL-SCNC: 145 MMOL/L (ref 136–145)
WBC # BLD AUTO: 4.59 K/UL (ref 3.9–12.7)

## 2021-06-22 PROCEDURE — 84165 PROTEIN E-PHORESIS SERUM: CPT | Performed by: NURSE PRACTITIONER

## 2021-06-22 PROCEDURE — 86334 PATHOLOGIST INTERPRETATION IFE: ICD-10-PCS | Mod: 26,,, | Performed by: PATHOLOGY

## 2021-06-22 PROCEDURE — 83520 IMMUNOASSAY QUANT NOS NONAB: CPT | Performed by: NURSE PRACTITIONER

## 2021-06-22 PROCEDURE — 84165 PROTEIN E-PHORESIS SERUM: CPT | Mod: 26,,, | Performed by: PATHOLOGY

## 2021-06-22 PROCEDURE — 36415 COLL VENOUS BLD VENIPUNCTURE: CPT | Performed by: NURSE PRACTITIONER

## 2021-06-22 PROCEDURE — 85025 COMPLETE CBC W/AUTO DIFF WBC: CPT | Performed by: NURSE PRACTITIONER

## 2021-06-22 PROCEDURE — 86334 IMMUNOFIX E-PHORESIS SERUM: CPT | Mod: 26,,, | Performed by: PATHOLOGY

## 2021-06-22 PROCEDURE — 84165 PATHOLOGIST INTERPRETATION SPE: ICD-10-PCS | Mod: 26,,, | Performed by: PATHOLOGY

## 2021-06-22 PROCEDURE — 80048 BASIC METABOLIC PNL TOTAL CA: CPT | Performed by: NURSE PRACTITIONER

## 2021-06-22 PROCEDURE — 86334 IMMUNOFIX E-PHORESIS SERUM: CPT | Performed by: NURSE PRACTITIONER

## 2021-06-23 LAB
ALBUMIN SERPL ELPH-MCNC: 3.88 G/DL (ref 3.35–5.55)
ALPHA1 GLOB SERPL ELPH-MCNC: 0.23 G/DL (ref 0.17–0.41)
ALPHA2 GLOB SERPL ELPH-MCNC: 0.65 G/DL (ref 0.43–0.99)
B-GLOBULIN SERPL ELPH-MCNC: 0.64 G/DL (ref 0.5–1.1)
GAMMA GLOB SERPL ELPH-MCNC: 0.99 G/DL (ref 0.67–1.58)
INTERPRETATION SERPL IFE-IMP: NORMAL
KAPPA LC SER QL IA: 3.16 MG/DL (ref 0.33–1.94)
KAPPA LC/LAMBDA SER IA: 1.33 (ref 0.26–1.65)
LAMBDA LC SER QL IA: 2.37 MG/DL (ref 0.57–2.63)
PATHOLOGIST INTERPRETATION IFE: NORMAL
PATHOLOGIST INTERPRETATION SPE: NORMAL
PROT SERPL-MCNC: 6.4 G/DL (ref 6–8.4)

## 2021-06-28 ENCOUNTER — OFFICE VISIT (OUTPATIENT)
Dept: HEMATOLOGY/ONCOLOGY | Facility: CLINIC | Age: 81
End: 2021-06-28
Payer: MEDICARE

## 2021-06-28 VITALS
OXYGEN SATURATION: 99 % | WEIGHT: 217.63 LBS | HEART RATE: 74 BPM | BODY MASS INDEX: 27.06 KG/M2 | HEIGHT: 75 IN | DIASTOLIC BLOOD PRESSURE: 71 MMHG | TEMPERATURE: 97 F | SYSTOLIC BLOOD PRESSURE: 120 MMHG

## 2021-06-28 DIAGNOSIS — D64.9 CHRONIC ANEMIA: Primary | ICD-10-CM

## 2021-06-28 PROCEDURE — 1159F PR MEDICATION LIST DOCUMENTED IN MEDICAL RECORD: ICD-10-PCS | Mod: S$GLB,,, | Performed by: NURSE PRACTITIONER

## 2021-06-28 PROCEDURE — 99499 RISK ADDL DX/OHS AUDIT: ICD-10-PCS | Mod: S$GLB,,, | Performed by: NURSE PRACTITIONER

## 2021-06-28 PROCEDURE — 1159F MED LIST DOCD IN RCRD: CPT | Mod: S$GLB,,, | Performed by: NURSE PRACTITIONER

## 2021-06-28 PROCEDURE — 99999 PR PBB SHADOW E&M-EST. PATIENT-LVL V: ICD-10-PCS | Mod: PBBFAC,,, | Performed by: NURSE PRACTITIONER

## 2021-06-28 PROCEDURE — 1101F PR PT FALLS ASSESS DOC 0-1 FALLS W/OUT INJ PAST YR: ICD-10-PCS | Mod: CPTII,S$GLB,, | Performed by: NURSE PRACTITIONER

## 2021-06-28 PROCEDURE — 99214 PR OFFICE/OUTPT VISIT, EST, LEVL IV, 30-39 MIN: ICD-10-PCS | Mod: S$GLB,,, | Performed by: NURSE PRACTITIONER

## 2021-06-28 PROCEDURE — 3288F FALL RISK ASSESSMENT DOCD: CPT | Mod: CPTII,S$GLB,, | Performed by: NURSE PRACTITIONER

## 2021-06-28 PROCEDURE — 1101F PT FALLS ASSESS-DOCD LE1/YR: CPT | Mod: CPTII,S$GLB,, | Performed by: NURSE PRACTITIONER

## 2021-06-28 PROCEDURE — 3288F PR FALLS RISK ASSESSMENT DOCUMENTED: ICD-10-PCS | Mod: CPTII,S$GLB,, | Performed by: NURSE PRACTITIONER

## 2021-06-28 PROCEDURE — 3072F LOW RISK FOR RETINOPATHY: CPT | Mod: S$GLB,,, | Performed by: NURSE PRACTITIONER

## 2021-06-28 PROCEDURE — 99999 PR PBB SHADOW E&M-EST. PATIENT-LVL V: CPT | Mod: PBBFAC,,, | Performed by: NURSE PRACTITIONER

## 2021-06-28 PROCEDURE — 3072F PR LOW RISK FOR RETINOPATHY: ICD-10-PCS | Mod: S$GLB,,, | Performed by: NURSE PRACTITIONER

## 2021-06-28 PROCEDURE — 99214 OFFICE O/P EST MOD 30 MIN: CPT | Mod: S$GLB,,, | Performed by: NURSE PRACTITIONER

## 2021-06-28 PROCEDURE — 1126F PR PAIN SEVERITY QUANTIFIED, NO PAIN PRESENT: ICD-10-PCS | Mod: S$GLB,,, | Performed by: NURSE PRACTITIONER

## 2021-06-28 PROCEDURE — 1126F AMNT PAIN NOTED NONE PRSNT: CPT | Mod: S$GLB,,, | Performed by: NURSE PRACTITIONER

## 2021-06-28 PROCEDURE — 99499 UNLISTED E&M SERVICE: CPT | Mod: S$GLB,,, | Performed by: NURSE PRACTITIONER

## 2021-07-19 ENCOUNTER — TELEPHONE (OUTPATIENT)
Dept: ORTHOPEDICS | Facility: CLINIC | Age: 81
End: 2021-07-19

## 2021-07-19 ENCOUNTER — OFFICE VISIT (OUTPATIENT)
Dept: INTERNAL MEDICINE | Facility: CLINIC | Age: 81
End: 2021-07-19
Payer: MEDICARE

## 2021-07-19 VITALS
SYSTOLIC BLOOD PRESSURE: 100 MMHG | OXYGEN SATURATION: 99 % | HEART RATE: 79 BPM | DIASTOLIC BLOOD PRESSURE: 60 MMHG | WEIGHT: 218.94 LBS | BODY MASS INDEX: 27.22 KG/M2 | HEIGHT: 75 IN

## 2021-07-19 DIAGNOSIS — G47.33 OSA (OBSTRUCTIVE SLEEP APNEA): ICD-10-CM

## 2021-07-19 DIAGNOSIS — D64.9 ANEMIA, UNSPECIFIED TYPE: ICD-10-CM

## 2021-07-19 DIAGNOSIS — E11.22 DIABETES MELLITUS WITH STAGE 3 CHRONIC KIDNEY DISEASE: ICD-10-CM

## 2021-07-19 DIAGNOSIS — E11.49 TYPE II DIABETES MELLITUS WITH NEUROLOGICAL MANIFESTATIONS: ICD-10-CM

## 2021-07-19 DIAGNOSIS — R06.02 SOB (SHORTNESS OF BREATH) ON EXERTION: ICD-10-CM

## 2021-07-19 DIAGNOSIS — E11.69 HYPERLIPIDEMIA ASSOCIATED WITH TYPE 2 DIABETES MELLITUS: ICD-10-CM

## 2021-07-19 DIAGNOSIS — R26.9 ABNORMALITY OF GAIT AND MOBILITY: ICD-10-CM

## 2021-07-19 DIAGNOSIS — Z00.00 ENCOUNTER FOR PREVENTIVE HEALTH EXAMINATION: Primary | ICD-10-CM

## 2021-07-19 DIAGNOSIS — I10 HYPERTENSION, UNSPECIFIED TYPE: ICD-10-CM

## 2021-07-19 DIAGNOSIS — N18.30 DIABETES MELLITUS WITH STAGE 3 CHRONIC KIDNEY DISEASE: ICD-10-CM

## 2021-07-19 DIAGNOSIS — E78.5 HYPERLIPIDEMIA ASSOCIATED WITH TYPE 2 DIABETES MELLITUS: ICD-10-CM

## 2021-07-19 DIAGNOSIS — I25.10 CORONARY ARTERY DISEASE, ANGINA PRESENCE UNSPECIFIED, UNSPECIFIED VESSEL OR LESION TYPE, UNSPECIFIED WHETHER NATIVE OR TRANSPLANTED HEART: ICD-10-CM

## 2021-07-19 DIAGNOSIS — Z99.89 DEPENDENCE ON OTHER ENABLING MACHINES AND DEVICES: ICD-10-CM

## 2021-07-19 DIAGNOSIS — D57.3 SICKLE CELL TRAIT: ICD-10-CM

## 2021-07-19 DIAGNOSIS — I70.0 ATHEROSCLEROSIS OF AORTA: ICD-10-CM

## 2021-07-19 PROCEDURE — 3072F PR LOW RISK FOR RETINOPATHY: ICD-10-PCS | Mod: CPTII,S$GLB,, | Performed by: NURSE PRACTITIONER

## 2021-07-19 PROCEDURE — 3078F DIAST BP <80 MM HG: CPT | Mod: CPTII,S$GLB,, | Performed by: NURSE PRACTITIONER

## 2021-07-19 PROCEDURE — 1126F AMNT PAIN NOTED NONE PRSNT: CPT | Mod: CPTII,S$GLB,, | Performed by: NURSE PRACTITIONER

## 2021-07-19 PROCEDURE — 99499 RISK ADDL DX/OHS AUDIT: ICD-10-PCS | Mod: HCNC,S$GLB,, | Performed by: NURSE PRACTITIONER

## 2021-07-19 PROCEDURE — 3074F SYST BP LT 130 MM HG: CPT | Mod: CPTII,S$GLB,, | Performed by: NURSE PRACTITIONER

## 2021-07-19 PROCEDURE — 99499 UNLISTED E&M SERVICE: CPT | Mod: HCNC,S$GLB,, | Performed by: NURSE PRACTITIONER

## 2021-07-19 PROCEDURE — 1101F PT FALLS ASSESS-DOCD LE1/YR: CPT | Mod: CPTII,S$GLB,, | Performed by: NURSE PRACTITIONER

## 2021-07-19 PROCEDURE — 3074F PR MOST RECENT SYSTOLIC BLOOD PRESSURE < 130 MM HG: ICD-10-PCS | Mod: CPTII,S$GLB,, | Performed by: NURSE PRACTITIONER

## 2021-07-19 PROCEDURE — 99999 PR PBB SHADOW E&M-EST. PATIENT-LVL V: CPT | Mod: PBBFAC,,, | Performed by: NURSE PRACTITIONER

## 2021-07-19 PROCEDURE — 1101F PR PT FALLS ASSESS DOC 0-1 FALLS W/OUT INJ PAST YR: ICD-10-PCS | Mod: CPTII,S$GLB,, | Performed by: NURSE PRACTITIONER

## 2021-07-19 PROCEDURE — 3288F PR FALLS RISK ASSESSMENT DOCUMENTED: ICD-10-PCS | Mod: CPTII,S$GLB,, | Performed by: NURSE PRACTITIONER

## 2021-07-19 PROCEDURE — 1126F PR PAIN SEVERITY QUANTIFIED, NO PAIN PRESENT: ICD-10-PCS | Mod: CPTII,S$GLB,, | Performed by: NURSE PRACTITIONER

## 2021-07-19 PROCEDURE — 99999 PR PBB SHADOW E&M-EST. PATIENT-LVL V: ICD-10-PCS | Mod: PBBFAC,,, | Performed by: NURSE PRACTITIONER

## 2021-07-19 PROCEDURE — G0439 PR MEDICARE ANNUAL WELLNESS SUBSEQUENT VISIT: ICD-10-PCS | Mod: S$GLB,,, | Performed by: NURSE PRACTITIONER

## 2021-07-19 PROCEDURE — 3072F LOW RISK FOR RETINOPATHY: CPT | Mod: CPTII,S$GLB,, | Performed by: NURSE PRACTITIONER

## 2021-07-19 PROCEDURE — 3078F PR MOST RECENT DIASTOLIC BLOOD PRESSURE < 80 MM HG: ICD-10-PCS | Mod: CPTII,S$GLB,, | Performed by: NURSE PRACTITIONER

## 2021-07-19 PROCEDURE — G0439 PPPS, SUBSEQ VISIT: HCPCS | Mod: S$GLB,,, | Performed by: NURSE PRACTITIONER

## 2021-07-19 PROCEDURE — 3288F FALL RISK ASSESSMENT DOCD: CPT | Mod: CPTII,S$GLB,, | Performed by: NURSE PRACTITIONER

## 2021-08-20 ENCOUNTER — PATIENT MESSAGE (OUTPATIENT)
Dept: TRANSPLANT | Facility: CLINIC | Age: 81
End: 2021-08-20

## 2021-08-24 ENCOUNTER — PATIENT MESSAGE (OUTPATIENT)
Dept: TRANSPLANT | Facility: CLINIC | Age: 81
End: 2021-08-24

## 2021-08-26 ENCOUNTER — OFFICE VISIT (OUTPATIENT)
Dept: URGENT CARE | Facility: CLINIC | Age: 81
End: 2021-08-26
Payer: MEDICARE

## 2021-08-26 VITALS
WEIGHT: 220 LBS | HEART RATE: 84 BPM | RESPIRATION RATE: 18 BRPM | TEMPERATURE: 97 F | DIASTOLIC BLOOD PRESSURE: 60 MMHG | SYSTOLIC BLOOD PRESSURE: 131 MMHG | HEIGHT: 75 IN | OXYGEN SATURATION: 99 % | BODY MASS INDEX: 27.35 KG/M2

## 2021-08-26 DIAGNOSIS — E11.42 TYPE 2 DIABETES MELLITUS WITH DIABETIC POLYNEUROPATHY, WITHOUT LONG-TERM CURRENT USE OF INSULIN: ICD-10-CM

## 2021-08-26 DIAGNOSIS — R35.0 URINE FREQUENCY: ICD-10-CM

## 2021-08-26 DIAGNOSIS — R06.02 SOB (SHORTNESS OF BREATH) ON EXERTION: ICD-10-CM

## 2021-08-26 DIAGNOSIS — Z11.52 ENCOUNTER FOR SCREENING FOR COVID-19: Primary | ICD-10-CM

## 2021-08-26 LAB
BILIRUB UR QL STRIP: NEGATIVE
CTP QC/QA: YES
GLUCOSE SERPL-MCNC: 124 MG/DL (ref 70–110)
GLUCOSE UR QL STRIP: POSITIVE
KETONES UR QL STRIP: NEGATIVE
LEUKOCYTE ESTERASE UR QL STRIP: NEGATIVE
PH, POC UA: 5
POC BLOOD, URINE: NEGATIVE
POC NITRATES, URINE: NEGATIVE
PROT UR QL STRIP: NEGATIVE
SARS-COV-2 RDRP RESP QL NAA+PROBE: NEGATIVE
SP GR UR STRIP: 1.01 (ref 1–1.03)
UROBILINOGEN UR STRIP-ACNC: ABNORMAL (ref 0.3–2.2)

## 2021-08-26 PROCEDURE — 3078F PR MOST RECENT DIASTOLIC BLOOD PRESSURE < 80 MM HG: ICD-10-PCS | Mod: CPTII,S$GLB,, | Performed by: PHYSICIAN ASSISTANT

## 2021-08-26 PROCEDURE — 1159F MED LIST DOCD IN RCRD: CPT | Mod: CPTII,S$GLB,, | Performed by: PHYSICIAN ASSISTANT

## 2021-08-26 PROCEDURE — 3072F PR LOW RISK FOR RETINOPATHY: ICD-10-PCS | Mod: CPTII,S$GLB,, | Performed by: PHYSICIAN ASSISTANT

## 2021-08-26 PROCEDURE — 93005 ELECTROCARDIOGRAM TRACING: CPT | Mod: S$GLB,,, | Performed by: PHYSICIAN ASSISTANT

## 2021-08-26 PROCEDURE — 93010 ELECTROCARDIOGRAM REPORT: CPT | Mod: S$GLB,,, | Performed by: INTERNAL MEDICINE

## 2021-08-26 PROCEDURE — U0002: ICD-10-PCS | Mod: QW,S$GLB,, | Performed by: PHYSICIAN ASSISTANT

## 2021-08-26 PROCEDURE — 99214 OFFICE O/P EST MOD 30 MIN: CPT | Mod: 25,S$GLB,CS, | Performed by: PHYSICIAN ASSISTANT

## 2021-08-26 PROCEDURE — 3072F LOW RISK FOR RETINOPATHY: CPT | Mod: CPTII,S$GLB,, | Performed by: PHYSICIAN ASSISTANT

## 2021-08-26 PROCEDURE — 81003 URINALYSIS AUTO W/O SCOPE: CPT | Mod: QW,S$GLB,, | Performed by: PHYSICIAN ASSISTANT

## 2021-08-26 PROCEDURE — 82962 GLUCOSE BLOOD TEST: CPT | Mod: S$GLB,,, | Performed by: PHYSICIAN ASSISTANT

## 2021-08-26 PROCEDURE — 81003 POCT URINALYSIS, DIPSTICK, AUTOMATED, W/O SCOPE: ICD-10-PCS | Mod: QW,S$GLB,, | Performed by: PHYSICIAN ASSISTANT

## 2021-08-26 PROCEDURE — 3078F DIAST BP <80 MM HG: CPT | Mod: CPTII,S$GLB,, | Performed by: PHYSICIAN ASSISTANT

## 2021-08-26 PROCEDURE — 93010 EKG 12-LEAD: ICD-10-PCS | Mod: S$GLB,,, | Performed by: INTERNAL MEDICINE

## 2021-08-26 PROCEDURE — U0002 COVID-19 LAB TEST NON-CDC: HCPCS | Mod: QW,S$GLB,, | Performed by: PHYSICIAN ASSISTANT

## 2021-08-26 PROCEDURE — 1126F AMNT PAIN NOTED NONE PRSNT: CPT | Mod: CPTII,S$GLB,, | Performed by: PHYSICIAN ASSISTANT

## 2021-08-26 PROCEDURE — 3075F SYST BP GE 130 - 139MM HG: CPT | Mod: CPTII,S$GLB,, | Performed by: PHYSICIAN ASSISTANT

## 2021-08-26 PROCEDURE — 93005 EKG 12-LEAD: ICD-10-PCS | Mod: S$GLB,,, | Performed by: PHYSICIAN ASSISTANT

## 2021-08-26 PROCEDURE — 82962 POCT GLUCOSE, HAND-HELD DEVICE: ICD-10-PCS | Mod: S$GLB,,, | Performed by: PHYSICIAN ASSISTANT

## 2021-08-26 PROCEDURE — 1159F PR MEDICATION LIST DOCUMENTED IN MEDICAL RECORD: ICD-10-PCS | Mod: CPTII,S$GLB,, | Performed by: PHYSICIAN ASSISTANT

## 2021-08-26 PROCEDURE — 99214 PR OFFICE/OUTPT VISIT, EST, LEVL IV, 30-39 MIN: ICD-10-PCS | Mod: 25,S$GLB,CS, | Performed by: PHYSICIAN ASSISTANT

## 2021-08-26 PROCEDURE — 1126F PR PAIN SEVERITY QUANTIFIED, NO PAIN PRESENT: ICD-10-PCS | Mod: CPTII,S$GLB,, | Performed by: PHYSICIAN ASSISTANT

## 2021-08-26 PROCEDURE — 3075F PR MOST RECENT SYSTOLIC BLOOD PRESS GE 130-139MM HG: ICD-10-PCS | Mod: CPTII,S$GLB,, | Performed by: PHYSICIAN ASSISTANT

## 2021-08-31 ENCOUNTER — TELEPHONE (OUTPATIENT)
Dept: ORTHOPEDICS | Facility: CLINIC | Age: 81
End: 2021-08-31

## 2021-09-15 ENCOUNTER — HOSPITAL ENCOUNTER (OUTPATIENT)
Dept: CARDIOLOGY | Facility: HOSPITAL | Age: 81
Discharge: HOME | End: 2021-09-15
Attending: STUDENT IN AN ORGANIZED HEALTH CARE EDUCATION/TRAINING PROGRAM
Payer: MEDICARE

## 2021-09-15 ENCOUNTER — PATIENT OUTREACH (OUTPATIENT)
Dept: ADMINISTRATIVE | Facility: OTHER | Age: 81
End: 2021-09-15

## 2021-09-15 ENCOUNTER — OFFICE VISIT (OUTPATIENT)
Dept: CARDIOLOGY | Facility: CLINIC | Age: 81
End: 2021-09-15
Payer: MEDICARE

## 2021-09-15 ENCOUNTER — OFFICE VISIT (OUTPATIENT)
Dept: INTERNAL MEDICINE | Facility: CLINIC | Age: 81
End: 2021-09-15
Payer: MEDICARE

## 2021-09-15 VITALS
HEIGHT: 75 IN | WEIGHT: 218.25 LBS | TEMPERATURE: 98 F | BODY MASS INDEX: 27.14 KG/M2 | DIASTOLIC BLOOD PRESSURE: 64 MMHG | SYSTOLIC BLOOD PRESSURE: 132 MMHG | HEART RATE: 72 BPM

## 2021-09-15 VITALS
HEIGHT: 75 IN | WEIGHT: 218.94 LBS | BODY MASS INDEX: 27.22 KG/M2 | HEART RATE: 76 BPM | SYSTOLIC BLOOD PRESSURE: 118 MMHG | DIASTOLIC BLOOD PRESSURE: 58 MMHG

## 2021-09-15 DIAGNOSIS — E11.42 TYPE 2 DIABETES MELLITUS WITH DIABETIC POLYNEUROPATHY, WITHOUT LONG-TERM CURRENT USE OF INSULIN: Primary | Chronic | ICD-10-CM

## 2021-09-15 DIAGNOSIS — I15.2 HYPERTENSION ASSOCIATED WITH DIABETES: ICD-10-CM

## 2021-09-15 DIAGNOSIS — R06.09 DYSPNEA ON EXERTION: Primary | ICD-10-CM

## 2021-09-15 DIAGNOSIS — E11.69 COMBINED HYPERLIPIDEMIA ASSOCIATED WITH TYPE 2 DIABETES MELLITUS: Chronic | ICD-10-CM

## 2021-09-15 DIAGNOSIS — E78.2 COMBINED HYPERLIPIDEMIA ASSOCIATED WITH TYPE 2 DIABETES MELLITUS: Chronic | ICD-10-CM

## 2021-09-15 DIAGNOSIS — I70.0 ATHEROSCLEROSIS OF AORTA: Primary | ICD-10-CM

## 2021-09-15 DIAGNOSIS — E11.59 HYPERTENSION ASSOCIATED WITH DIABETES: ICD-10-CM

## 2021-09-15 DIAGNOSIS — I25.10 CORONARY ARTERY DISEASE, OCCLUSIVE: ICD-10-CM

## 2021-09-15 DIAGNOSIS — R06.09 DYSPNEA ON EXERTION: ICD-10-CM

## 2021-09-15 DIAGNOSIS — R94.31 ELECTROCARDIOGRAM SHOWING T WAVE ABNORMALITIES: ICD-10-CM

## 2021-09-15 DIAGNOSIS — I70.0 AORTIC CALCIFICATION: ICD-10-CM

## 2021-09-15 DIAGNOSIS — E11.51 TYPE 2 DIABETES MELLITUS WITH DIABETIC PERIPHERAL ANGIOPATHY WITHOUT GANGRENE, WITHOUT LONG-TERM CURRENT USE OF INSULIN: ICD-10-CM

## 2021-09-15 DIAGNOSIS — M79.606 PAIN OF LOWER EXTREMITY, UNSPECIFIED LATERALITY: ICD-10-CM

## 2021-09-15 PROCEDURE — 99214 PR OFFICE/OUTPT VISIT, EST, LEVL IV, 30-39 MIN: ICD-10-PCS | Mod: S$GLB,,, | Performed by: STUDENT IN AN ORGANIZED HEALTH CARE EDUCATION/TRAINING PROGRAM

## 2021-09-15 PROCEDURE — 3078F PR MOST RECENT DIASTOLIC BLOOD PRESSURE < 80 MM HG: ICD-10-PCS | Mod: CPTII,S$GLB,, | Performed by: STUDENT IN AN ORGANIZED HEALTH CARE EDUCATION/TRAINING PROGRAM

## 2021-09-15 PROCEDURE — 3072F PR LOW RISK FOR RETINOPATHY: ICD-10-PCS | Mod: CPTII,S$GLB,, | Performed by: INTERNAL MEDICINE

## 2021-09-15 PROCEDURE — 99999 PR PBB SHADOW E&M-EST. PATIENT-LVL IV: CPT | Mod: PBBFAC,,, | Performed by: STUDENT IN AN ORGANIZED HEALTH CARE EDUCATION/TRAINING PROGRAM

## 2021-09-15 PROCEDURE — 93010 EKG 12-LEAD: ICD-10-PCS | Mod: ,,, | Performed by: INTERNAL MEDICINE

## 2021-09-15 PROCEDURE — 3074F PR MOST RECENT SYSTOLIC BLOOD PRESSURE < 130 MM HG: ICD-10-PCS | Mod: CPTII,S$GLB,, | Performed by: STUDENT IN AN ORGANIZED HEALTH CARE EDUCATION/TRAINING PROGRAM

## 2021-09-15 PROCEDURE — 99397 PR PREVENTIVE VISIT,EST,65 & OVER: ICD-10-PCS | Mod: S$GLB,,, | Performed by: INTERNAL MEDICINE

## 2021-09-15 PROCEDURE — 1160F PR REVIEW ALL MEDS BY PRESCRIBER/CLIN PHARMACIST DOCUMENTED: ICD-10-PCS | Mod: CPTII,S$GLB,, | Performed by: INTERNAL MEDICINE

## 2021-09-15 PROCEDURE — 3072F LOW RISK FOR RETINOPATHY: CPT | Mod: CPTII,S$GLB,, | Performed by: STUDENT IN AN ORGANIZED HEALTH CARE EDUCATION/TRAINING PROGRAM

## 2021-09-15 PROCEDURE — 3072F LOW RISK FOR RETINOPATHY: CPT | Mod: CPTII,S$GLB,, | Performed by: INTERNAL MEDICINE

## 2021-09-15 PROCEDURE — 3078F PR MOST RECENT DIASTOLIC BLOOD PRESSURE < 80 MM HG: ICD-10-PCS | Mod: CPTII,S$GLB,, | Performed by: INTERNAL MEDICINE

## 2021-09-15 PROCEDURE — 1125F AMNT PAIN NOTED PAIN PRSNT: CPT | Mod: CPTII,S$GLB,, | Performed by: STUDENT IN AN ORGANIZED HEALTH CARE EDUCATION/TRAINING PROGRAM

## 2021-09-15 PROCEDURE — 1101F PT FALLS ASSESS-DOCD LE1/YR: CPT | Mod: CPTII,S$GLB,, | Performed by: INTERNAL MEDICINE

## 2021-09-15 PROCEDURE — 3074F SYST BP LT 130 MM HG: CPT | Mod: CPTII,S$GLB,, | Performed by: STUDENT IN AN ORGANIZED HEALTH CARE EDUCATION/TRAINING PROGRAM

## 2021-09-15 PROCEDURE — 1101F PR PT FALLS ASSESS DOC 0-1 FALLS W/OUT INJ PAST YR: ICD-10-PCS | Mod: CPTII,S$GLB,, | Performed by: STUDENT IN AN ORGANIZED HEALTH CARE EDUCATION/TRAINING PROGRAM

## 2021-09-15 PROCEDURE — 1125F PR PAIN SEVERITY QUANTIFIED, PAIN PRESENT: ICD-10-PCS | Mod: CPTII,S$GLB,, | Performed by: STUDENT IN AN ORGANIZED HEALTH CARE EDUCATION/TRAINING PROGRAM

## 2021-09-15 PROCEDURE — 3075F PR MOST RECENT SYSTOLIC BLOOD PRESS GE 130-139MM HG: ICD-10-PCS | Mod: CPTII,S$GLB,, | Performed by: INTERNAL MEDICINE

## 2021-09-15 PROCEDURE — 99999 PR PBB SHADOW E&M-EST. PATIENT-LVL V: ICD-10-PCS | Mod: PBBFAC,,, | Performed by: INTERNAL MEDICINE

## 2021-09-15 PROCEDURE — 93010 ELECTROCARDIOGRAM REPORT: CPT | Mod: ,,, | Performed by: INTERNAL MEDICINE

## 2021-09-15 PROCEDURE — 99999 PR PBB SHADOW E&M-EST. PATIENT-LVL V: CPT | Mod: PBBFAC,,, | Performed by: INTERNAL MEDICINE

## 2021-09-15 PROCEDURE — 3288F PR FALLS RISK ASSESSMENT DOCUMENTED: ICD-10-PCS | Mod: CPTII,S$GLB,, | Performed by: INTERNAL MEDICINE

## 2021-09-15 PROCEDURE — 1160F RVW MEDS BY RX/DR IN RCRD: CPT | Mod: CPTII,S$GLB,, | Performed by: INTERNAL MEDICINE

## 2021-09-15 PROCEDURE — 3078F DIAST BP <80 MM HG: CPT | Mod: CPTII,S$GLB,, | Performed by: INTERNAL MEDICINE

## 2021-09-15 PROCEDURE — 1159F PR MEDICATION LIST DOCUMENTED IN MEDICAL RECORD: ICD-10-PCS | Mod: CPTII,S$GLB,, | Performed by: INTERNAL MEDICINE

## 2021-09-15 PROCEDURE — 1126F PR PAIN SEVERITY QUANTIFIED, NO PAIN PRESENT: ICD-10-PCS | Mod: CPTII,S$GLB,, | Performed by: INTERNAL MEDICINE

## 2021-09-15 PROCEDURE — 93005 ELECTROCARDIOGRAM TRACING: CPT | Mod: PO

## 2021-09-15 PROCEDURE — 3288F PR FALLS RISK ASSESSMENT DOCUMENTED: ICD-10-PCS | Mod: CPTII,S$GLB,, | Performed by: STUDENT IN AN ORGANIZED HEALTH CARE EDUCATION/TRAINING PROGRAM

## 2021-09-15 PROCEDURE — 3288F FALL RISK ASSESSMENT DOCD: CPT | Mod: CPTII,S$GLB,, | Performed by: INTERNAL MEDICINE

## 2021-09-15 PROCEDURE — 1159F PR MEDICATION LIST DOCUMENTED IN MEDICAL RECORD: ICD-10-PCS | Mod: CPTII,S$GLB,, | Performed by: STUDENT IN AN ORGANIZED HEALTH CARE EDUCATION/TRAINING PROGRAM

## 2021-09-15 PROCEDURE — 3078F DIAST BP <80 MM HG: CPT | Mod: CPTII,S$GLB,, | Performed by: STUDENT IN AN ORGANIZED HEALTH CARE EDUCATION/TRAINING PROGRAM

## 2021-09-15 PROCEDURE — 3075F SYST BP GE 130 - 139MM HG: CPT | Mod: CPTII,S$GLB,, | Performed by: INTERNAL MEDICINE

## 2021-09-15 PROCEDURE — 1159F MED LIST DOCD IN RCRD: CPT | Mod: CPTII,S$GLB,, | Performed by: STUDENT IN AN ORGANIZED HEALTH CARE EDUCATION/TRAINING PROGRAM

## 2021-09-15 PROCEDURE — 1126F AMNT PAIN NOTED NONE PRSNT: CPT | Mod: CPTII,S$GLB,, | Performed by: INTERNAL MEDICINE

## 2021-09-15 PROCEDURE — 3288F FALL RISK ASSESSMENT DOCD: CPT | Mod: CPTII,S$GLB,, | Performed by: STUDENT IN AN ORGANIZED HEALTH CARE EDUCATION/TRAINING PROGRAM

## 2021-09-15 PROCEDURE — 1101F PT FALLS ASSESS-DOCD LE1/YR: CPT | Mod: CPTII,S$GLB,, | Performed by: STUDENT IN AN ORGANIZED HEALTH CARE EDUCATION/TRAINING PROGRAM

## 2021-09-15 PROCEDURE — 99397 PER PM REEVAL EST PAT 65+ YR: CPT | Mod: S$GLB,,, | Performed by: INTERNAL MEDICINE

## 2021-09-15 PROCEDURE — 1101F PR PT FALLS ASSESS DOC 0-1 FALLS W/OUT INJ PAST YR: ICD-10-PCS | Mod: CPTII,S$GLB,, | Performed by: INTERNAL MEDICINE

## 2021-09-15 PROCEDURE — 99999 PR PBB SHADOW E&M-EST. PATIENT-LVL IV: ICD-10-PCS | Mod: PBBFAC,,, | Performed by: STUDENT IN AN ORGANIZED HEALTH CARE EDUCATION/TRAINING PROGRAM

## 2021-09-15 PROCEDURE — 99214 OFFICE O/P EST MOD 30 MIN: CPT | Mod: S$GLB,,, | Performed by: STUDENT IN AN ORGANIZED HEALTH CARE EDUCATION/TRAINING PROGRAM

## 2021-09-15 PROCEDURE — 3072F PR LOW RISK FOR RETINOPATHY: ICD-10-PCS | Mod: CPTII,S$GLB,, | Performed by: STUDENT IN AN ORGANIZED HEALTH CARE EDUCATION/TRAINING PROGRAM

## 2021-09-15 PROCEDURE — 1159F MED LIST DOCD IN RCRD: CPT | Mod: CPTII,S$GLB,, | Performed by: INTERNAL MEDICINE

## 2021-09-17 ENCOUNTER — LAB VISIT (OUTPATIENT)
Dept: LAB | Facility: HOSPITAL | Age: 81
End: 2021-09-17
Attending: INTERNAL MEDICINE
Payer: MEDICARE

## 2021-09-17 DIAGNOSIS — E11.42 TYPE 2 DIABETES MELLITUS WITH DIABETIC POLYNEUROPATHY, WITHOUT LONG-TERM CURRENT USE OF INSULIN: Chronic | ICD-10-CM

## 2021-09-17 DIAGNOSIS — E11.51 TYPE 2 DIABETES MELLITUS WITH DIABETIC PERIPHERAL ANGIOPATHY WITHOUT GANGRENE, WITHOUT LONG-TERM CURRENT USE OF INSULIN: ICD-10-CM

## 2021-09-17 PROCEDURE — 80061 LIPID PANEL: CPT | Performed by: INTERNAL MEDICINE

## 2021-09-17 PROCEDURE — 85025 COMPLETE CBC W/AUTO DIFF WBC: CPT | Performed by: INTERNAL MEDICINE

## 2021-09-17 PROCEDURE — 83036 HEMOGLOBIN GLYCOSYLATED A1C: CPT | Performed by: INTERNAL MEDICINE

## 2021-09-17 PROCEDURE — 80053 COMPREHEN METABOLIC PANEL: CPT | Performed by: INTERNAL MEDICINE

## 2021-09-18 LAB
ALBUMIN SERPL BCP-MCNC: 3.7 G/DL (ref 3.5–5.2)
ALP SERPL-CCNC: 57 U/L (ref 55–135)
ALT SERPL W/O P-5'-P-CCNC: 18 U/L (ref 10–44)
ANION GAP SERPL CALC-SCNC: 14 MMOL/L (ref 8–16)
AST SERPL-CCNC: 20 U/L (ref 10–40)
BASOPHILS # BLD AUTO: 0.05 K/UL (ref 0–0.2)
BASOPHILS NFR BLD: 1.1 % (ref 0–1.9)
BILIRUB SERPL-MCNC: 0.5 MG/DL (ref 0.1–1)
BUN SERPL-MCNC: 19 MG/DL (ref 8–23)
CALCIUM SERPL-MCNC: 9.3 MG/DL (ref 8.7–10.5)
CHLORIDE SERPL-SCNC: 107 MMOL/L (ref 95–110)
CHOLEST SERPL-MCNC: 143 MG/DL (ref 120–199)
CHOLEST/HDLC SERPL: 4.8 {RATIO} (ref 2–5)
CO2 SERPL-SCNC: 24 MMOL/L (ref 23–29)
CREAT SERPL-MCNC: 1.4 MG/DL (ref 0.5–1.4)
DIFFERENTIAL METHOD: ABNORMAL
EOSINOPHIL # BLD AUTO: 0.7 K/UL (ref 0–0.5)
EOSINOPHIL NFR BLD: 14 % (ref 0–8)
ERYTHROCYTE [DISTWIDTH] IN BLOOD BY AUTOMATED COUNT: 15.5 % (ref 11.5–14.5)
EST. GFR  (AFRICAN AMERICAN): 55.2 ML/MIN/1.73 M^2
EST. GFR  (NON AFRICAN AMERICAN): 47.8 ML/MIN/1.73 M^2
ESTIMATED AVG GLUCOSE: 131 MG/DL (ref 68–131)
GLUCOSE SERPL-MCNC: 105 MG/DL (ref 70–110)
HBA1C MFR BLD: 6.2 % (ref 4–5.6)
HCT VFR BLD AUTO: 38.6 % (ref 40–54)
HDLC SERPL-MCNC: 30 MG/DL (ref 40–75)
HDLC SERPL: 21 % (ref 20–50)
HGB BLD-MCNC: 12.4 G/DL (ref 14–18)
IMM GRANULOCYTES # BLD AUTO: 0.02 K/UL (ref 0–0.04)
IMM GRANULOCYTES NFR BLD AUTO: 0.4 % (ref 0–0.5)
LDLC SERPL CALC-MCNC: 79 MG/DL (ref 63–159)
LYMPHOCYTES # BLD AUTO: 1.9 K/UL (ref 1–4.8)
LYMPHOCYTES NFR BLD: 40 % (ref 18–48)
MCH RBC QN AUTO: 29.1 PG (ref 27–31)
MCHC RBC AUTO-ENTMCNC: 32.1 G/DL (ref 32–36)
MCV RBC AUTO: 91 FL (ref 82–98)
MONOCYTES # BLD AUTO: 0.4 K/UL (ref 0.3–1)
MONOCYTES NFR BLD: 8.4 % (ref 4–15)
NEUTROPHILS # BLD AUTO: 1.7 K/UL (ref 1.8–7.7)
NEUTROPHILS NFR BLD: 36.1 % (ref 38–73)
NONHDLC SERPL-MCNC: 113 MG/DL
NRBC BLD-RTO: 0 /100 WBC
PLATELET # BLD AUTO: 189 K/UL (ref 150–450)
PMV BLD AUTO: 11.1 FL (ref 9.2–12.9)
POTASSIUM SERPL-SCNC: 4.9 MMOL/L (ref 3.5–5.1)
PROT SERPL-MCNC: 6.7 G/DL (ref 6–8.4)
RBC # BLD AUTO: 4.26 M/UL (ref 4.6–6.2)
SODIUM SERPL-SCNC: 145 MMOL/L (ref 136–145)
TRIGL SERPL-MCNC: 170 MG/DL (ref 30–150)
WBC # BLD AUTO: 4.63 K/UL (ref 3.9–12.7)

## 2021-09-29 ENCOUNTER — IMMUNIZATION (OUTPATIENT)
Dept: PRIMARY CARE CLINIC | Facility: CLINIC | Age: 81
End: 2021-09-29
Payer: MEDICARE

## 2021-09-29 DIAGNOSIS — Z23 NEED FOR VACCINATION: Primary | ICD-10-CM

## 2021-09-29 PROCEDURE — 0003A COVID-19, MRNA, LNP-S, PF, 30 MCG/0.3 ML DOSE VACCINE: CPT | Mod: CV19,PBBFAC | Performed by: FAMILY MEDICINE

## 2021-09-29 PROCEDURE — 91300 COVID-19, MRNA, LNP-S, PF, 30 MCG/0.3 ML DOSE VACCINE: CPT | Mod: PBBFAC | Performed by: FAMILY MEDICINE

## 2021-10-12 ENCOUNTER — HOSPITAL ENCOUNTER (OUTPATIENT)
Dept: CARDIOLOGY | Facility: HOSPITAL | Age: 81
Discharge: HOME | End: 2021-10-12
Attending: STUDENT IN AN ORGANIZED HEALTH CARE EDUCATION/TRAINING PROGRAM
Payer: MEDICARE

## 2021-10-12 ENCOUNTER — HOSPITAL ENCOUNTER (OUTPATIENT)
Dept: RADIOLOGY | Facility: HOSPITAL | Age: 81
Discharge: HOME | End: 2021-10-12
Attending: STUDENT IN AN ORGANIZED HEALTH CARE EDUCATION/TRAINING PROGRAM
Payer: MEDICARE

## 2021-10-12 VITALS
WEIGHT: 218 LBS | DIASTOLIC BLOOD PRESSURE: 58 MMHG | BODY MASS INDEX: 27.1 KG/M2 | SYSTOLIC BLOOD PRESSURE: 118 MMHG | HEIGHT: 75 IN

## 2021-10-12 DIAGNOSIS — R06.09 DYSPNEA ON EXERTION: ICD-10-CM

## 2021-10-12 DIAGNOSIS — R94.31 ELECTROCARDIOGRAM SHOWING T WAVE ABNORMALITIES: ICD-10-CM

## 2021-10-12 LAB
AORTIC ROOT ANNULUS: 3.53 CM
AV INDEX (PROSTH): 0.64
AV MEAN GRADIENT: 3 MMHG
AV PEAK GRADIENT: 6 MMHG
AV VALVE AREA: 2.02 CM2
AV VELOCITY RATIO: 0.7
BSA FOR ECHO PROCEDURE: 2.29 M2
CV ECHO LV RWT: 0.43 CM
CV STRESS BASE HR: 62 BPM
DIASTOLIC BLOOD PRESSURE: 70 MMHG
DOP CALC AO PEAK VEL: 1.21 M/S
DOP CALC AO VTI: 30.42 CM
DOP CALC LVOT AREA: 3.1 CM2
DOP CALC LVOT DIAMETER: 2 CM
DOP CALC LVOT PEAK VEL: 0.85 M/S
DOP CALC LVOT STROKE VOLUME: 61.36 CM3
DOP CALC RVOT PEAK VEL: 0.64 M/S
DOP CALC RVOT VTI: 11.83 CM
DOP CALCLVOT PEAK VEL VTI: 19.54 CM
E WAVE DECELERATION TIME: 281.17 MSEC
E/A RATIO: 0.83
E/E' RATIO: 8.38 M/S
ECHO LV POSTERIOR WALL: 0.93 CM (ref 0.6–1.1)
EJECTION FRACTION: 55 %
FRACTIONAL SHORTENING: 23 % (ref 28–44)
INTERVENTRICULAR SEPTUM: 0.94 CM (ref 0.6–1.1)
IVRT: 88.49 MSEC
LA MAJOR: 4.41 CM
LA MINOR: 4.91 CM
LA WIDTH: 2.84 CM
LEFT ATRIUM SIZE: 3.15 CM
LEFT ATRIUM VOLUME INDEX: 15.5 ML/M2
LEFT ATRIUM VOLUME: 35.33 CM3
LEFT INTERNAL DIMENSION IN SYSTOLE: 3.32 CM (ref 2.1–4)
LEFT VENTRICLE DIASTOLIC VOLUME INDEX: 36.54 ML/M2
LEFT VENTRICLE DIASTOLIC VOLUME: 83.3 ML
LEFT VENTRICLE MASS INDEX: 57 G/M2
LEFT VENTRICLE SYSTOLIC VOLUME INDEX: 19.6 ML/M2
LEFT VENTRICLE SYSTOLIC VOLUME: 44.71 ML
LEFT VENTRICULAR INTERNAL DIMENSION IN DIASTOLE: 4.31 CM (ref 3.5–6)
LEFT VENTRICULAR MASS: 130.36 G
LV LATERAL E/E' RATIO: 8.38 M/S
LV SEPTAL E/E' RATIO: 8.38 M/S
MV A" WAVE DURATION": 10.28 MSEC
MV PEAK A VEL: 0.81 M/S
MV PEAK E VEL: 0.67 M/S
NUC REST EJECTION FRACTION: 47
NUC STRESS EJECTION FRACTION: 63 %
OHS CV CPX 1 MINUTE RECOVERY HEART RATE: 81 BPM
OHS CV CPX 85 PERCENT MAX PREDICTED HEART RATE MALE: 121
OHS CV CPX ESTIMATED METS: 1
OHS CV CPX MAX PREDICTED HEART RATE: 142
OHS CV CPX PATIENT IS FEMALE: 0
OHS CV CPX PATIENT IS MALE: 1
OHS CV CPX PEAK DIASTOLIC BLOOD PRESSURE: 70 MMHG
OHS CV CPX PEAK HEAR RATE: 82 BPM
OHS CV CPX PEAK RATE PRESSURE PRODUCT: NORMAL
OHS CV CPX PEAK SYSTOLIC BLOOD PRESSURE: 129 MMHG
OHS CV CPX PERCENT MAX PREDICTED HEART RATE ACHIEVED: 58
OHS CV CPX RATE PRESSURE PRODUCT PRESENTING: 7998
PISA TR MAX VEL: 2.45 M/S
PULM VEIN S/D RATIO: 1.66
PV MEAN GRADIENT: 1 MMHG
PV PEAK D VEL: 0.38 M/S
PV PEAK S VEL: 0.63 M/S
PV PEAK VELOCITY: 0.76 CM/S
RA MAJOR: 4.29 CM
RA WIDTH: 3.27 CM
RIGHT VENTRICULAR END-DIASTOLIC DIMENSION: 2.66 CM
SINUS: 2.83 CM
STJ: 2.88 CM
STRESS ECHO POST EXERCISE DUR MIN: 0 MINUTES
STRESS ECHO POST EXERCISE DUR SEC: 49 SECONDS
SYSTOLIC BLOOD PRESSURE: 129 MMHG
TDI LATERAL: 0.08 M/S
TDI SEPTAL: 0.08 M/S
TDI: 0.08 M/S
TR MAX PG: 24 MMHG
TRICUSPID ANNULAR PLANE SYSTOLIC EXCURSION: 1.99 CM

## 2021-10-12 PROCEDURE — 93016 CV STRESS TEST SUPVJ ONLY: CPT | Mod: HCNC,,, | Performed by: INTERNAL MEDICINE

## 2021-10-12 PROCEDURE — 78452 HT MUSCLE IMAGE SPECT MULT: CPT | Mod: HCNC

## 2021-10-12 PROCEDURE — 93018 CV STRESS TEST I&R ONLY: CPT | Mod: HCNC,,, | Performed by: INTERNAL MEDICINE

## 2021-10-12 PROCEDURE — 93018 STRESS TEST WITH MYOCARDIAL PERFUSION (CUPID ONLY): ICD-10-PCS | Mod: HCNC,,, | Performed by: INTERNAL MEDICINE

## 2021-10-12 PROCEDURE — 93017 CV STRESS TEST TRACING ONLY: CPT | Mod: HCNC

## 2021-10-12 PROCEDURE — A9502 TC99M TETROFOSMIN: HCPCS | Mod: HCNC

## 2021-10-12 PROCEDURE — 63600175 PHARM REV CODE 636 W HCPCS: Mod: HCNC | Performed by: STUDENT IN AN ORGANIZED HEALTH CARE EDUCATION/TRAINING PROGRAM

## 2021-10-12 PROCEDURE — 78452 STRESS TEST WITH MYOCARDIAL PERFUSION (CUPID ONLY): ICD-10-PCS | Mod: 26,HCNC,, | Performed by: INTERNAL MEDICINE

## 2021-10-12 PROCEDURE — 93016 STRESS TEST WITH MYOCARDIAL PERFUSION (CUPID ONLY): ICD-10-PCS | Mod: HCNC,,, | Performed by: INTERNAL MEDICINE

## 2021-10-12 PROCEDURE — 78452 HT MUSCLE IMAGE SPECT MULT: CPT | Mod: 26,HCNC,, | Performed by: INTERNAL MEDICINE

## 2021-10-12 PROCEDURE — 93306 TTE W/DOPPLER COMPLETE: CPT | Mod: HCNC

## 2021-10-12 PROCEDURE — 93306 ECHO (CUPID ONLY): ICD-10-PCS | Mod: 26,HCNC,, | Performed by: INTERNAL MEDICINE

## 2021-10-12 PROCEDURE — 93306 TTE W/DOPPLER COMPLETE: CPT | Mod: 26,HCNC,, | Performed by: INTERNAL MEDICINE

## 2021-10-12 RX ORDER — REGADENOSON 0.08 MG/ML
0.4 INJECTION, SOLUTION INTRAVENOUS ONCE
Status: COMPLETED | OUTPATIENT
Start: 2021-10-12 | End: 2021-10-12

## 2021-10-12 RX ADMIN — REGADENOSON 0.4 MG: 0.08 INJECTION, SOLUTION INTRAVENOUS at 11:10

## 2021-10-13 ENCOUNTER — OFFICE VISIT (OUTPATIENT)
Dept: CARDIOLOGY | Facility: CLINIC | Age: 81
End: 2021-10-13
Payer: MEDICARE

## 2021-10-13 VITALS
RESPIRATION RATE: 16 BRPM | DIASTOLIC BLOOD PRESSURE: 62 MMHG | WEIGHT: 220.44 LBS | BODY MASS INDEX: 27.41 KG/M2 | SYSTOLIC BLOOD PRESSURE: 120 MMHG | HEIGHT: 75 IN | HEART RATE: 73 BPM | OXYGEN SATURATION: 99 %

## 2021-10-13 DIAGNOSIS — I15.2 HYPERTENSION ASSOCIATED WITH DIABETES: ICD-10-CM

## 2021-10-13 DIAGNOSIS — E78.2 COMBINED HYPERLIPIDEMIA ASSOCIATED WITH TYPE 2 DIABETES MELLITUS: Chronic | ICD-10-CM

## 2021-10-13 DIAGNOSIS — E11.69 COMBINED HYPERLIPIDEMIA ASSOCIATED WITH TYPE 2 DIABETES MELLITUS: Chronic | ICD-10-CM

## 2021-10-13 DIAGNOSIS — M47.26 OSTEOARTHRITIS OF SPINE WITH RADICULOPATHY, LUMBAR REGION: Primary | Chronic | ICD-10-CM

## 2021-10-13 DIAGNOSIS — E11.59 HYPERTENSION ASSOCIATED WITH DIABETES: ICD-10-CM

## 2021-10-13 DIAGNOSIS — R06.09 DYSPNEA ON EXERTION: ICD-10-CM

## 2021-10-13 DIAGNOSIS — I70.0 AORTIC CALCIFICATION: Chronic | ICD-10-CM

## 2021-10-13 DIAGNOSIS — R79.89 ELEVATED SERUM CREATININE: Chronic | ICD-10-CM

## 2021-10-13 DIAGNOSIS — E11.42 TYPE 2 DIABETES MELLITUS WITH DIABETIC POLYNEUROPATHY, WITHOUT LONG-TERM CURRENT USE OF INSULIN: Chronic | ICD-10-CM

## 2021-10-13 PROCEDURE — 99214 PR OFFICE/OUTPT VISIT, EST, LEVL IV, 30-39 MIN: ICD-10-PCS | Mod: HCNC,S$GLB,, | Performed by: STUDENT IN AN ORGANIZED HEALTH CARE EDUCATION/TRAINING PROGRAM

## 2021-10-13 PROCEDURE — 99999 PR PBB SHADOW E&M-EST. PATIENT-LVL IV: CPT | Mod: PBBFAC,HCNC,, | Performed by: STUDENT IN AN ORGANIZED HEALTH CARE EDUCATION/TRAINING PROGRAM

## 2021-10-13 PROCEDURE — 3072F LOW RISK FOR RETINOPATHY: CPT | Mod: HCNC,CPTII,S$GLB, | Performed by: STUDENT IN AN ORGANIZED HEALTH CARE EDUCATION/TRAINING PROGRAM

## 2021-10-13 PROCEDURE — 3072F PR LOW RISK FOR RETINOPATHY: ICD-10-PCS | Mod: HCNC,CPTII,S$GLB, | Performed by: STUDENT IN AN ORGANIZED HEALTH CARE EDUCATION/TRAINING PROGRAM

## 2021-10-13 PROCEDURE — 99499 UNLISTED E&M SERVICE: CPT | Mod: S$GLB,,, | Performed by: STUDENT IN AN ORGANIZED HEALTH CARE EDUCATION/TRAINING PROGRAM

## 2021-10-13 PROCEDURE — 3078F PR MOST RECENT DIASTOLIC BLOOD PRESSURE < 80 MM HG: ICD-10-PCS | Mod: HCNC,CPTII,S$GLB, | Performed by: STUDENT IN AN ORGANIZED HEALTH CARE EDUCATION/TRAINING PROGRAM

## 2021-10-13 PROCEDURE — 99499 RISK ADDL DX/OHS AUDIT: ICD-10-PCS | Mod: S$GLB,,, | Performed by: STUDENT IN AN ORGANIZED HEALTH CARE EDUCATION/TRAINING PROGRAM

## 2021-10-13 PROCEDURE — 3078F DIAST BP <80 MM HG: CPT | Mod: HCNC,CPTII,S$GLB, | Performed by: STUDENT IN AN ORGANIZED HEALTH CARE EDUCATION/TRAINING PROGRAM

## 2021-10-13 PROCEDURE — 3074F SYST BP LT 130 MM HG: CPT | Mod: HCNC,CPTII,S$GLB, | Performed by: STUDENT IN AN ORGANIZED HEALTH CARE EDUCATION/TRAINING PROGRAM

## 2021-10-13 PROCEDURE — 99214 OFFICE O/P EST MOD 30 MIN: CPT | Mod: HCNC,S$GLB,, | Performed by: STUDENT IN AN ORGANIZED HEALTH CARE EDUCATION/TRAINING PROGRAM

## 2021-10-13 PROCEDURE — 1159F MED LIST DOCD IN RCRD: CPT | Mod: HCNC,CPTII,S$GLB, | Performed by: STUDENT IN AN ORGANIZED HEALTH CARE EDUCATION/TRAINING PROGRAM

## 2021-10-13 PROCEDURE — 3074F PR MOST RECENT SYSTOLIC BLOOD PRESSURE < 130 MM HG: ICD-10-PCS | Mod: HCNC,CPTII,S$GLB, | Performed by: STUDENT IN AN ORGANIZED HEALTH CARE EDUCATION/TRAINING PROGRAM

## 2021-10-13 PROCEDURE — 1159F PR MEDICATION LIST DOCUMENTED IN MEDICAL RECORD: ICD-10-PCS | Mod: HCNC,CPTII,S$GLB, | Performed by: STUDENT IN AN ORGANIZED HEALTH CARE EDUCATION/TRAINING PROGRAM

## 2021-10-13 PROCEDURE — 99999 PR PBB SHADOW E&M-EST. PATIENT-LVL IV: ICD-10-PCS | Mod: PBBFAC,HCNC,, | Performed by: STUDENT IN AN ORGANIZED HEALTH CARE EDUCATION/TRAINING PROGRAM

## 2021-10-21 ENCOUNTER — HOSPITAL ENCOUNTER (OUTPATIENT)
Dept: RADIOLOGY | Facility: HOSPITAL | Age: 81
Discharge: HOME | End: 2021-10-21
Attending: NURSE PRACTITIONER
Payer: MEDICARE

## 2021-10-21 ENCOUNTER — PATIENT MESSAGE (OUTPATIENT)
Dept: PHARMACY | Facility: CLINIC | Age: 81
End: 2021-10-21
Payer: MEDICARE

## 2021-10-21 ENCOUNTER — OFFICE VISIT (OUTPATIENT)
Dept: PULMONOLOGY | Facility: CLINIC | Age: 81
End: 2021-10-21
Payer: MEDICARE

## 2021-10-21 VITALS
HEIGHT: 75 IN | RESPIRATION RATE: 16 BRPM | DIASTOLIC BLOOD PRESSURE: 88 MMHG | HEART RATE: 79 BPM | SYSTOLIC BLOOD PRESSURE: 130 MMHG | OXYGEN SATURATION: 99 % | BODY MASS INDEX: 26.76 KG/M2 | WEIGHT: 215.19 LBS

## 2021-10-21 DIAGNOSIS — G47.33 OSA (OBSTRUCTIVE SLEEP APNEA): Chronic | ICD-10-CM

## 2021-10-21 DIAGNOSIS — M54.42 CHRONIC BILATERAL LOW BACK PAIN WITH BILATERAL SCIATICA: Chronic | ICD-10-CM

## 2021-10-21 DIAGNOSIS — R06.02 SOB (SHORTNESS OF BREATH) ON EXERTION: ICD-10-CM

## 2021-10-21 DIAGNOSIS — E11.51 TYPE 2 DIABETES MELLITUS WITH DIABETIC PERIPHERAL ANGIOPATHY WITHOUT GANGRENE, WITHOUT LONG-TERM CURRENT USE OF INSULIN: ICD-10-CM

## 2021-10-21 DIAGNOSIS — G89.29 CHRONIC BILATERAL LOW BACK PAIN WITH BILATERAL SCIATICA: Chronic | ICD-10-CM

## 2021-10-21 DIAGNOSIS — M54.41 CHRONIC BILATERAL LOW BACK PAIN WITH BILATERAL SCIATICA: Chronic | ICD-10-CM

## 2021-10-21 DIAGNOSIS — G47.52 CONTROLLED REM SLEEP BEHAVIOR DISORDER: ICD-10-CM

## 2021-10-21 DIAGNOSIS — G47.61 PLMD (PERIODIC LIMB MOVEMENT DISORDER): Chronic | ICD-10-CM

## 2021-10-21 DIAGNOSIS — R06.02 SOB (SHORTNESS OF BREATH) ON EXERTION: Primary | ICD-10-CM

## 2021-10-21 PROCEDURE — 3075F SYST BP GE 130 - 139MM HG: CPT | Mod: HCNC,CPTII,S$GLB, | Performed by: NURSE PRACTITIONER

## 2021-10-21 PROCEDURE — 71046 X-RAY EXAM CHEST 2 VIEWS: CPT | Mod: 26,HCNC,, | Performed by: RADIOLOGY

## 2021-10-21 PROCEDURE — 99999 PR PBB SHADOW E&M-EST. PATIENT-LVL V: CPT | Mod: PBBFAC,HCNC,, | Performed by: NURSE PRACTITIONER

## 2021-10-21 PROCEDURE — 99999 PR PBB SHADOW E&M-EST. PATIENT-LVL V: ICD-10-PCS | Mod: PBBFAC,HCNC,, | Performed by: NURSE PRACTITIONER

## 2021-10-21 PROCEDURE — 71046 X-RAY EXAM CHEST 2 VIEWS: CPT | Mod: TC,HCNC

## 2021-10-21 PROCEDURE — 99215 PR OFFICE/OUTPT VISIT, EST, LEVL V, 40-54 MIN: ICD-10-PCS | Mod: HCNC,S$GLB,, | Performed by: NURSE PRACTITIONER

## 2021-10-21 PROCEDURE — 71046 XR CHEST PA AND LATERAL: ICD-10-PCS | Mod: 26,HCNC,, | Performed by: RADIOLOGY

## 2021-10-21 PROCEDURE — 99215 OFFICE O/P EST HI 40 MIN: CPT | Mod: HCNC,S$GLB,, | Performed by: NURSE PRACTITIONER

## 2021-10-21 PROCEDURE — 1160F PR REVIEW ALL MEDS BY PRESCRIBER/CLIN PHARMACIST DOCUMENTED: ICD-10-PCS | Mod: HCNC,CPTII,S$GLB, | Performed by: NURSE PRACTITIONER

## 2021-10-21 PROCEDURE — 1159F MED LIST DOCD IN RCRD: CPT | Mod: HCNC,CPTII,S$GLB, | Performed by: NURSE PRACTITIONER

## 2021-10-21 PROCEDURE — 3079F PR MOST RECENT DIASTOLIC BLOOD PRESSURE 80-89 MM HG: ICD-10-PCS | Mod: HCNC,CPTII,S$GLB, | Performed by: NURSE PRACTITIONER

## 2021-10-21 PROCEDURE — 3075F PR MOST RECENT SYSTOLIC BLOOD PRESS GE 130-139MM HG: ICD-10-PCS | Mod: HCNC,CPTII,S$GLB, | Performed by: NURSE PRACTITIONER

## 2021-10-21 PROCEDURE — 1159F PR MEDICATION LIST DOCUMENTED IN MEDICAL RECORD: ICD-10-PCS | Mod: HCNC,CPTII,S$GLB, | Performed by: NURSE PRACTITIONER

## 2021-10-21 PROCEDURE — 1160F RVW MEDS BY RX/DR IN RCRD: CPT | Mod: HCNC,CPTII,S$GLB, | Performed by: NURSE PRACTITIONER

## 2021-10-21 PROCEDURE — 3079F DIAST BP 80-89 MM HG: CPT | Mod: HCNC,CPTII,S$GLB, | Performed by: NURSE PRACTITIONER

## 2021-10-21 PROCEDURE — 3072F LOW RISK FOR RETINOPATHY: CPT | Mod: HCNC,CPTII,S$GLB, | Performed by: NURSE PRACTITIONER

## 2021-10-21 PROCEDURE — 3072F PR LOW RISK FOR RETINOPATHY: ICD-10-PCS | Mod: HCNC,CPTII,S$GLB, | Performed by: NURSE PRACTITIONER

## 2021-10-21 RX ORDER — ALBUTEROL SULFATE 90 UG/1
2 AEROSOL, METERED RESPIRATORY (INHALATION) EVERY 4 HOURS PRN
Qty: 18 G | Refills: 11 | Status: SHIPPED | OUTPATIENT
Start: 2021-10-21 | End: 2022-06-15

## 2021-11-08 ENCOUNTER — TELEPHONE (OUTPATIENT)
Dept: RADIOLOGY | Facility: HOSPITAL | Age: 81
End: 2021-11-08
Payer: MEDICARE

## 2021-11-09 ENCOUNTER — OFFICE VISIT (OUTPATIENT)
Dept: UROLOGY | Facility: CLINIC | Age: 81
End: 2021-11-09
Payer: MEDICARE

## 2021-11-09 ENCOUNTER — HOSPITAL ENCOUNTER (OUTPATIENT)
Dept: RADIOLOGY | Facility: HOSPITAL | Age: 81
Discharge: HOME | End: 2021-11-09
Attending: UROLOGY
Payer: MEDICARE

## 2021-11-09 VITALS
DIASTOLIC BLOOD PRESSURE: 67 MMHG | BODY MASS INDEX: 26.76 KG/M2 | HEIGHT: 75 IN | WEIGHT: 215.19 LBS | SYSTOLIC BLOOD PRESSURE: 117 MMHG | TEMPERATURE: 98 F | HEART RATE: 79 BPM

## 2021-11-09 DIAGNOSIS — N52.9 ERECTILE DYSFUNCTION, UNSPECIFIED ERECTILE DYSFUNCTION TYPE: ICD-10-CM

## 2021-11-09 DIAGNOSIS — Z12.5 PROSTATE CANCER SCREENING: ICD-10-CM

## 2021-11-09 DIAGNOSIS — N28.1 RENAL CYST: Primary | ICD-10-CM

## 2021-11-09 PROBLEM — M25.552 CHRONIC HIP PAIN AFTER TOTAL REPLACEMENT OF LEFT HIP JOINT: Status: RESOLVED | Noted: 2019-11-01 | Resolved: 2021-11-09

## 2021-11-09 PROBLEM — R79.89 ELEVATED SERUM CREATININE: Chronic | Status: RESOLVED | Noted: 2018-08-13 | Resolved: 2021-11-09

## 2021-11-09 PROBLEM — G89.29 CHRONIC HIP PAIN AFTER TOTAL REPLACEMENT OF LEFT HIP JOINT: Status: RESOLVED | Noted: 2019-11-01 | Resolved: 2021-11-09

## 2021-11-09 PROBLEM — Z96.642 CHRONIC HIP PAIN AFTER TOTAL REPLACEMENT OF LEFT HIP JOINT: Status: RESOLVED | Noted: 2019-11-01 | Resolved: 2021-11-09

## 2021-11-09 PROBLEM — R06.02 SOB (SHORTNESS OF BREATH) ON EXERTION: Status: RESOLVED | Noted: 2021-10-21 | Resolved: 2021-11-09

## 2021-11-09 LAB
BILIRUB SERPL-MCNC: NORMAL MG/DL
BLOOD URINE, POC: NORMAL
CLARITY, POC UA: CLEAR
COLOR, POC UA: YELLOW
GLUCOSE UR QL STRIP: NORMAL
KETONES UR QL STRIP: NORMAL
LEUKOCYTE ESTERASE URINE, POC: NORMAL
NITRITE, POC UA: NORMAL
PH, POC UA: 6
POC RESIDUAL URINE VOLUME: 37 ML (ref 0–100)
PROTEIN, POC: NORMAL
SPECIFIC GRAVITY, POC UA: 1.01
UROBILINOGEN, POC UA: NORMAL

## 2021-11-09 PROCEDURE — 1160F RVW MEDS BY RX/DR IN RCRD: CPT | Mod: HCNC,CPTII,S$GLB, | Performed by: NURSE PRACTITIONER

## 2021-11-09 PROCEDURE — 3078F DIAST BP <80 MM HG: CPT | Mod: HCNC,CPTII,S$GLB, | Performed by: NURSE PRACTITIONER

## 2021-11-09 PROCEDURE — 1159F PR MEDICATION LIST DOCUMENTED IN MEDICAL RECORD: ICD-10-PCS | Mod: HCNC,CPTII,S$GLB, | Performed by: NURSE PRACTITIONER

## 2021-11-09 PROCEDURE — 3072F PR LOW RISK FOR RETINOPATHY: ICD-10-PCS | Mod: HCNC,CPTII,S$GLB, | Performed by: NURSE PRACTITIONER

## 2021-11-09 PROCEDURE — 3078F PR MOST RECENT DIASTOLIC BLOOD PRESSURE < 80 MM HG: ICD-10-PCS | Mod: HCNC,CPTII,S$GLB, | Performed by: NURSE PRACTITIONER

## 2021-11-09 PROCEDURE — 81002 POCT URINE DIPSTICK WITHOUT MICROSCOPE: ICD-10-PCS | Mod: HCNC,S$GLB,, | Performed by: NURSE PRACTITIONER

## 2021-11-09 PROCEDURE — 3074F SYST BP LT 130 MM HG: CPT | Mod: HCNC,CPTII,S$GLB, | Performed by: NURSE PRACTITIONER

## 2021-11-09 PROCEDURE — 3288F PR FALLS RISK ASSESSMENT DOCUMENTED: ICD-10-PCS | Mod: HCNC,CPTII,S$GLB, | Performed by: NURSE PRACTITIONER

## 2021-11-09 PROCEDURE — 1101F PT FALLS ASSESS-DOCD LE1/YR: CPT | Mod: HCNC,CPTII,S$GLB, | Performed by: NURSE PRACTITIONER

## 2021-11-09 PROCEDURE — 51798 US URINE CAPACITY MEASURE: CPT | Mod: HCNC,S$GLB,, | Performed by: NURSE PRACTITIONER

## 2021-11-09 PROCEDURE — 99999 PR PBB SHADOW E&M-EST. PATIENT-LVL IV: CPT | Mod: PBBFAC,HCNC,, | Performed by: NURSE PRACTITIONER

## 2021-11-09 PROCEDURE — 99215 OFFICE O/P EST HI 40 MIN: CPT | Mod: HCNC,S$GLB,, | Performed by: NURSE PRACTITIONER

## 2021-11-09 PROCEDURE — 1160F PR REVIEW ALL MEDS BY PRESCRIBER/CLIN PHARMACIST DOCUMENTED: ICD-10-PCS | Mod: HCNC,CPTII,S$GLB, | Performed by: NURSE PRACTITIONER

## 2021-11-09 PROCEDURE — 76770 US EXAM ABDO BACK WALL COMP: CPT | Mod: TC,HCNC

## 2021-11-09 PROCEDURE — 51798 POCT BLADDER SCAN: ICD-10-PCS | Mod: HCNC,S$GLB,, | Performed by: NURSE PRACTITIONER

## 2021-11-09 PROCEDURE — 3288F FALL RISK ASSESSMENT DOCD: CPT | Mod: HCNC,CPTII,S$GLB, | Performed by: NURSE PRACTITIONER

## 2021-11-09 PROCEDURE — 1101F PR PT FALLS ASSESS DOC 0-1 FALLS W/OUT INJ PAST YR: ICD-10-PCS | Mod: HCNC,CPTII,S$GLB, | Performed by: NURSE PRACTITIONER

## 2021-11-09 PROCEDURE — 81002 URINALYSIS NONAUTO W/O SCOPE: CPT | Mod: HCNC,S$GLB,, | Performed by: NURSE PRACTITIONER

## 2021-11-09 PROCEDURE — 3072F LOW RISK FOR RETINOPATHY: CPT | Mod: HCNC,CPTII,S$GLB, | Performed by: NURSE PRACTITIONER

## 2021-11-09 PROCEDURE — 76770 US RETROPERITONEAL COMPLETE: ICD-10-PCS | Mod: 26,HCNC,, | Performed by: RADIOLOGY

## 2021-11-09 PROCEDURE — 99215 PR OFFICE/OUTPT VISIT, EST, LEVL V, 40-54 MIN: ICD-10-PCS | Mod: HCNC,S$GLB,, | Performed by: NURSE PRACTITIONER

## 2021-11-09 PROCEDURE — 3074F PR MOST RECENT SYSTOLIC BLOOD PRESSURE < 130 MM HG: ICD-10-PCS | Mod: HCNC,CPTII,S$GLB, | Performed by: NURSE PRACTITIONER

## 2021-11-09 PROCEDURE — 1159F MED LIST DOCD IN RCRD: CPT | Mod: HCNC,CPTII,S$GLB, | Performed by: NURSE PRACTITIONER

## 2021-11-09 PROCEDURE — 99999 PR PBB SHADOW E&M-EST. PATIENT-LVL IV: ICD-10-PCS | Mod: PBBFAC,HCNC,, | Performed by: NURSE PRACTITIONER

## 2021-11-09 PROCEDURE — 76770 US EXAM ABDO BACK WALL COMP: CPT | Mod: 26,HCNC,, | Performed by: RADIOLOGY

## 2021-11-10 ENCOUNTER — TELEPHONE (OUTPATIENT)
Dept: UROLOGY | Facility: CLINIC | Age: 81
End: 2021-11-10
Payer: MEDICARE

## 2021-11-10 ENCOUNTER — OFFICE VISIT (OUTPATIENT)
Dept: ORTHOPEDICS | Facility: CLINIC | Age: 81
End: 2021-11-10
Payer: MEDICARE

## 2021-11-10 VITALS — HEIGHT: 75 IN | WEIGHT: 215 LBS | BODY MASS INDEX: 26.73 KG/M2

## 2021-11-10 DIAGNOSIS — Z12.5 PROSTATE CANCER SCREENING: Primary | ICD-10-CM

## 2021-11-10 DIAGNOSIS — M54.50 CHRONIC BILATERAL LOW BACK PAIN WITHOUT SCIATICA: Primary | ICD-10-CM

## 2021-11-10 DIAGNOSIS — M79.606 PAIN OF LOWER EXTREMITY, UNSPECIFIED LATERALITY: ICD-10-CM

## 2021-11-10 DIAGNOSIS — M17.11 PRIMARY OSTEOARTHRITIS OF RIGHT KNEE: ICD-10-CM

## 2021-11-10 DIAGNOSIS — G89.29 CHRONIC BILATERAL LOW BACK PAIN WITHOUT SCIATICA: Primary | ICD-10-CM

## 2021-11-10 PROCEDURE — 1125F PR PAIN SEVERITY QUANTIFIED, PAIN PRESENT: ICD-10-PCS | Mod: HCNC,CPTII,S$GLB, | Performed by: STUDENT IN AN ORGANIZED HEALTH CARE EDUCATION/TRAINING PROGRAM

## 2021-11-10 PROCEDURE — 20610 LARGE JOINT ASPIRATION/INJECTION: R KNEE: ICD-10-PCS | Mod: HCNC,RT,S$GLB, | Performed by: STUDENT IN AN ORGANIZED HEALTH CARE EDUCATION/TRAINING PROGRAM

## 2021-11-10 PROCEDURE — 1101F PT FALLS ASSESS-DOCD LE1/YR: CPT | Mod: HCNC,CPTII,S$GLB, | Performed by: STUDENT IN AN ORGANIZED HEALTH CARE EDUCATION/TRAINING PROGRAM

## 2021-11-10 PROCEDURE — 99213 PR OFFICE/OUTPT VISIT, EST, LEVL III, 20-29 MIN: ICD-10-PCS | Mod: HCNC,25,S$GLB, | Performed by: STUDENT IN AN ORGANIZED HEALTH CARE EDUCATION/TRAINING PROGRAM

## 2021-11-10 PROCEDURE — 1101F PR PT FALLS ASSESS DOC 0-1 FALLS W/OUT INJ PAST YR: ICD-10-PCS | Mod: HCNC,CPTII,S$GLB, | Performed by: STUDENT IN AN ORGANIZED HEALTH CARE EDUCATION/TRAINING PROGRAM

## 2021-11-10 PROCEDURE — 1159F PR MEDICATION LIST DOCUMENTED IN MEDICAL RECORD: ICD-10-PCS | Mod: HCNC,CPTII,S$GLB, | Performed by: STUDENT IN AN ORGANIZED HEALTH CARE EDUCATION/TRAINING PROGRAM

## 2021-11-10 PROCEDURE — 3288F FALL RISK ASSESSMENT DOCD: CPT | Mod: HCNC,CPTII,S$GLB, | Performed by: STUDENT IN AN ORGANIZED HEALTH CARE EDUCATION/TRAINING PROGRAM

## 2021-11-10 PROCEDURE — 3072F PR LOW RISK FOR RETINOPATHY: ICD-10-PCS | Mod: HCNC,CPTII,S$GLB, | Performed by: STUDENT IN AN ORGANIZED HEALTH CARE EDUCATION/TRAINING PROGRAM

## 2021-11-10 PROCEDURE — 1125F AMNT PAIN NOTED PAIN PRSNT: CPT | Mod: HCNC,CPTII,S$GLB, | Performed by: STUDENT IN AN ORGANIZED HEALTH CARE EDUCATION/TRAINING PROGRAM

## 2021-11-10 PROCEDURE — 1160F RVW MEDS BY RX/DR IN RCRD: CPT | Mod: HCNC,CPTII,S$GLB, | Performed by: STUDENT IN AN ORGANIZED HEALTH CARE EDUCATION/TRAINING PROGRAM

## 2021-11-10 PROCEDURE — 3072F LOW RISK FOR RETINOPATHY: CPT | Mod: HCNC,CPTII,S$GLB, | Performed by: STUDENT IN AN ORGANIZED HEALTH CARE EDUCATION/TRAINING PROGRAM

## 2021-11-10 PROCEDURE — 20610 DRAIN/INJ JOINT/BURSA W/O US: CPT | Mod: HCNC,RT,S$GLB, | Performed by: STUDENT IN AN ORGANIZED HEALTH CARE EDUCATION/TRAINING PROGRAM

## 2021-11-10 PROCEDURE — 1159F MED LIST DOCD IN RCRD: CPT | Mod: HCNC,CPTII,S$GLB, | Performed by: STUDENT IN AN ORGANIZED HEALTH CARE EDUCATION/TRAINING PROGRAM

## 2021-11-10 PROCEDURE — 99999 PR PBB SHADOW E&M-EST. PATIENT-LVL IV: ICD-10-PCS | Mod: PBBFAC,HCNC,, | Performed by: STUDENT IN AN ORGANIZED HEALTH CARE EDUCATION/TRAINING PROGRAM

## 2021-11-10 PROCEDURE — 99999 PR PBB SHADOW E&M-EST. PATIENT-LVL IV: CPT | Mod: PBBFAC,HCNC,, | Performed by: STUDENT IN AN ORGANIZED HEALTH CARE EDUCATION/TRAINING PROGRAM

## 2021-11-10 PROCEDURE — 3288F PR FALLS RISK ASSESSMENT DOCUMENTED: ICD-10-PCS | Mod: HCNC,CPTII,S$GLB, | Performed by: STUDENT IN AN ORGANIZED HEALTH CARE EDUCATION/TRAINING PROGRAM

## 2021-11-10 PROCEDURE — 99213 OFFICE O/P EST LOW 20 MIN: CPT | Mod: HCNC,25,S$GLB, | Performed by: STUDENT IN AN ORGANIZED HEALTH CARE EDUCATION/TRAINING PROGRAM

## 2021-11-10 PROCEDURE — 1160F PR REVIEW ALL MEDS BY PRESCRIBER/CLIN PHARMACIST DOCUMENTED: ICD-10-PCS | Mod: HCNC,CPTII,S$GLB, | Performed by: STUDENT IN AN ORGANIZED HEALTH CARE EDUCATION/TRAINING PROGRAM

## 2021-11-10 RX ADMIN — TRIAMCINOLONE ACETONIDE 40 MG: 40 INJECTION, SUSPENSION INTRA-ARTICULAR; INTRAMUSCULAR at 11:11

## 2021-11-11 ENCOUNTER — TELEPHONE (OUTPATIENT)
Dept: UROLOGY | Facility: CLINIC | Age: 81
End: 2021-11-11
Payer: MEDICARE

## 2021-11-19 RX ORDER — TRIAMCINOLONE ACETONIDE 40 MG/ML
40 INJECTION, SUSPENSION INTRA-ARTICULAR; INTRAMUSCULAR
Status: DISCONTINUED | OUTPATIENT
Start: 2021-11-10 | End: 2021-11-19 | Stop reason: HOSPADM

## 2021-11-22 ENCOUNTER — CLINICAL SUPPORT (OUTPATIENT)
Dept: REHABILITATION | Facility: HOSPITAL | Age: 81
End: 2021-11-22
Attending: STUDENT IN AN ORGANIZED HEALTH CARE EDUCATION/TRAINING PROGRAM
Payer: MEDICARE

## 2021-11-22 DIAGNOSIS — M54.50 CHRONIC BILATERAL LOW BACK PAIN WITHOUT SCIATICA: ICD-10-CM

## 2021-11-22 DIAGNOSIS — G89.29 CHRONIC BILATERAL LOW BACK PAIN WITHOUT SCIATICA: ICD-10-CM

## 2021-11-22 DIAGNOSIS — Z74.09 IMPAIRED FUNCTIONAL MOBILITY, BALANCE, AND ENDURANCE: ICD-10-CM

## 2021-11-22 PROCEDURE — 97162 PT EVAL MOD COMPLEX 30 MIN: CPT | Mod: HCNC

## 2021-11-22 PROCEDURE — 97110 THERAPEUTIC EXERCISES: CPT | Mod: HCNC

## 2021-12-01 DIAGNOSIS — I10 ESSENTIAL HYPERTENSION: ICD-10-CM

## 2021-12-01 RX ORDER — LOSARTAN POTASSIUM 50 MG/1
100 TABLET ORAL DAILY
Qty: 180 TABLET | Refills: 3 | Status: SHIPPED | OUTPATIENT
Start: 2021-12-01 | End: 2022-09-20

## 2021-12-02 ENCOUNTER — CLINICAL SUPPORT (OUTPATIENT)
Dept: REHABILITATION | Facility: HOSPITAL | Age: 81
End: 2021-12-02
Payer: MEDICARE

## 2021-12-02 DIAGNOSIS — Z74.09 IMPAIRED FUNCTIONAL MOBILITY, BALANCE, AND ENDURANCE: ICD-10-CM

## 2021-12-02 PROCEDURE — 97110 THERAPEUTIC EXERCISES: CPT | Mod: HCNC

## 2021-12-02 PROCEDURE — 97112 NEUROMUSCULAR REEDUCATION: CPT | Mod: HCNC

## 2021-12-07 ENCOUNTER — CLINICAL SUPPORT (OUTPATIENT)
Dept: REHABILITATION | Facility: HOSPITAL | Age: 81
End: 2021-12-07
Payer: MEDICARE

## 2021-12-07 DIAGNOSIS — Z74.09 IMPAIRED FUNCTIONAL MOBILITY, BALANCE, AND ENDURANCE: ICD-10-CM

## 2021-12-07 PROCEDURE — 97110 THERAPEUTIC EXERCISES: CPT | Mod: HCNC

## 2021-12-07 PROCEDURE — 97112 NEUROMUSCULAR REEDUCATION: CPT | Mod: HCNC

## 2021-12-14 ENCOUNTER — CLINICAL SUPPORT (OUTPATIENT)
Dept: REHABILITATION | Facility: HOSPITAL | Age: 81
End: 2021-12-14
Payer: MEDICARE

## 2021-12-14 DIAGNOSIS — Z74.09 IMPAIRED FUNCTIONAL MOBILITY, BALANCE, AND ENDURANCE: ICD-10-CM

## 2021-12-14 PROCEDURE — 97112 NEUROMUSCULAR REEDUCATION: CPT | Mod: HCNC

## 2021-12-14 PROCEDURE — 97110 THERAPEUTIC EXERCISES: CPT | Mod: HCNC

## 2021-12-21 ENCOUNTER — CLINICAL SUPPORT (OUTPATIENT)
Dept: REHABILITATION | Facility: HOSPITAL | Age: 81
End: 2021-12-21
Payer: MEDICARE

## 2021-12-21 DIAGNOSIS — Z74.09 IMPAIRED FUNCTIONAL MOBILITY, BALANCE, AND ENDURANCE: ICD-10-CM

## 2021-12-21 PROCEDURE — 97110 THERAPEUTIC EXERCISES: CPT | Mod: HCNC

## 2021-12-28 ENCOUNTER — CLINICAL SUPPORT (OUTPATIENT)
Dept: REHABILITATION | Facility: HOSPITAL | Age: 81
End: 2021-12-28
Payer: MEDICARE

## 2021-12-28 DIAGNOSIS — Z74.09 IMPAIRED FUNCTIONAL MOBILITY, BALANCE, AND ENDURANCE: ICD-10-CM

## 2021-12-28 PROCEDURE — 97110 THERAPEUTIC EXERCISES: CPT | Mod: HCNC,CQ

## 2021-12-29 ENCOUNTER — CLINICAL SUPPORT (OUTPATIENT)
Dept: PULMONOLOGY | Facility: CLINIC | Age: 81
End: 2021-12-29
Payer: MEDICARE

## 2021-12-29 VITALS — BODY MASS INDEX: 26.86 KG/M2 | WEIGHT: 216.06 LBS | HEIGHT: 75 IN

## 2021-12-29 DIAGNOSIS — R06.02 SOB (SHORTNESS OF BREATH) ON EXERTION: ICD-10-CM

## 2021-12-29 LAB
ALLENS TEST: ABNORMAL
BRPFT: NORMAL
DELSYS: ABNORMAL
DLCO ADJ PRE: 19.19 ML/(MIN*MMHG)
DLCO SINGLE BREATH LLN: 22.86
DLCO SINGLE BREATH PRE REF: 64.4 %
DLCO SINGLE BREATH REF: 29.79
DLCOC SBVA LLN: 2.65
DLCOC SBVA PRE REF: 95.1 %
DLCOC SBVA REF: 3.64
DLCOC SINGLE BREATH LLN: 22.86
DLCOC SINGLE BREATH PRE REF: 64.4 %
DLCOC SINGLE BREATH REF: 29.79
DLCOVA LLN: 2.65
DLCOVA PRE REF: 95.1 %
DLCOVA PRE: 3.46 ML/(MIN*MMHG*L)
DLCOVA REF: 3.64
DLVAADJ PRE: 3.46 ML/(MIN*MMHG*L)
ERV LLN: -16448.92
ERV PRE REF: 62.9 %
ERV REF: 1.08
FEF 25 75 LLN: 0.62
FEF 25 75 PRE REF: 168.7 %
FEF 25 75 REF: 2.03
FEV1 FVC LLN: 60
FEV1 FVC PRE REF: 105 %
FEV1 FVC REF: 74
FEV1 LLN: 1.91
FEV1 PRE REF: 112.7 %
FEV1 REF: 2.91
FIO2: 21
FRCPLETH LLN: 3.1
FRCPLETH PREREF: 75.3 %
FRCPLETH REF: 4.09
FVC LLN: 2.76
FVC PRE REF: 106.3 %
FVC REF: 3.94
HCO3 UR-SCNC: 20.4 MMOL/L (ref 24–28)
IVC PRE: 3.98 L
IVC SINGLE BREATH LLN: 2.76
IVC SINGLE BREATH PRE REF: 101.1 %
IVC SINGLE BREATH REF: 3.94
MODE: ABNORMAL
MVV LLN: 117
MVV PRE REF: 55.4 %
MVV REF: 137
PCO2 BLDA: 34.2 MMHG (ref 35–45)
PEF LLN: 5.91
PEF PRE REF: 97.3 %
PEF REF: 8.97
PH SMN: 7.38 [PH] (ref 7.35–7.45)
PO2 BLDA: 97 MMHG (ref 80–100)
POC BE: -5 MMOL/L
POC SATURATED O2: 98 % (ref 95–100)
PRE DLCO: 19.19 ML/(MIN*MMHG)
PRE ERV: 0.68 L
PRE FEF 25 75: 3.42 L/S
PRE FET 100: 12.28 SEC
PRE FEV1 FVC: 78.2 %
PRE FEV1: 3.27 L
PRE FRC PL: 3.08 L
PRE FVC: 4.19 L
PRE MVV: 76 L/MIN
PRE PEF: 8.73 L/S
PRE RV: 2.01 L
PRE TLC: 6.37 L
RAW LLN: 3.06
RAW PRE REF: 45.8 %
RAW PRE: 1.4 CMH2O*S/L
RAW REF: 3.06
RV LLN: 2.34
RV PRE REF: 66.7 %
RV REF: 3.01
RVTLC LLN: 36
RVTLC PRE REF: 70.4 %
RVTLC PRE: 31.52 %
RVTLC REF: 45
SAMPLE: ABNORMAL
SITE: ABNORMAL
TLC LLN: 7.03
TLC PRE REF: 77.9 %
TLC REF: 8.18
VA PRE: 5.55 L
VA SINGLE BREATH LLN: 8.03
VA SINGLE BREATH PRE REF: 69.1 %
VA SINGLE BREATH REF: 8.03
VC LLN: 2.76
VC PRE REF: 110.7 %
VC PRE: 4.36 L
VC REF: 3.94
VTGRAWPRE: 3.27 L

## 2021-12-29 PROCEDURE — 94726 PULM FUNCT TST PLETHYSMOGRAP: ICD-10-PCS | Mod: HCNC,S$GLB,, | Performed by: INTERNAL MEDICINE

## 2021-12-29 PROCEDURE — 36600 WITHDRAWAL OF ARTERIAL BLOOD: CPT | Mod: HCNC,S$GLB,, | Performed by: INTERNAL MEDICINE

## 2021-12-29 PROCEDURE — 94010 BREATHING CAPACITY TEST: CPT | Mod: HCNC,S$GLB,, | Performed by: INTERNAL MEDICINE

## 2021-12-29 PROCEDURE — 82803 PR  BLOOD GASES: PH, PO2 & PCO2: ICD-10-PCS | Mod: HCNC,S$GLB,, | Performed by: INTERNAL MEDICINE

## 2021-12-29 PROCEDURE — 94618 PULMONARY STRESS TESTING: ICD-10-PCS | Mod: HCNC,S$GLB,, | Performed by: INTERNAL MEDICINE

## 2021-12-29 PROCEDURE — 94729 DIFFUSING CAPACITY: CPT | Mod: HCNC,S$GLB,, | Performed by: INTERNAL MEDICINE

## 2021-12-29 PROCEDURE — 94729 PR C02/MEMBANE DIFFUSE CAPACITY: ICD-10-PCS | Mod: HCNC,S$GLB,, | Performed by: INTERNAL MEDICINE

## 2021-12-29 PROCEDURE — 94726 PLETHYSMOGRAPHY LUNG VOLUMES: CPT | Mod: HCNC,S$GLB,, | Performed by: INTERNAL MEDICINE

## 2021-12-29 PROCEDURE — 82803 BLOOD GASES ANY COMBINATION: CPT | Mod: HCNC,S$GLB,, | Performed by: INTERNAL MEDICINE

## 2021-12-29 PROCEDURE — 94010 BREATHING CAPACITY TEST: ICD-10-PCS | Mod: HCNC,S$GLB,, | Performed by: INTERNAL MEDICINE

## 2021-12-29 PROCEDURE — 94618 PULMONARY STRESS TESTING: CPT | Mod: HCNC,S$GLB,, | Performed by: INTERNAL MEDICINE

## 2021-12-29 PROCEDURE — 36600 PR WITHDRAWAL OF ARTERIAL BLOOD: ICD-10-PCS | Mod: HCNC,S$GLB,, | Performed by: INTERNAL MEDICINE

## 2021-12-30 ENCOUNTER — DOCUMENTATION ONLY (OUTPATIENT)
Dept: REHABILITATION | Facility: HOSPITAL | Age: 81
End: 2021-12-30
Payer: MEDICARE

## 2022-01-04 ENCOUNTER — CLINICAL SUPPORT (OUTPATIENT)
Dept: REHABILITATION | Facility: HOSPITAL | Age: 82
End: 2022-01-04
Payer: MEDICARE

## 2022-01-04 DIAGNOSIS — Z74.09 IMPAIRED FUNCTIONAL MOBILITY, BALANCE, AND ENDURANCE: ICD-10-CM

## 2022-01-04 PROCEDURE — 97110 THERAPEUTIC EXERCISES: CPT | Mod: HCNC

## 2022-01-04 NOTE — PROGRESS NOTES
[facility]   Physical Therapy Treatment Note     Name: [place] [facility] Number: [id number]    Therapy Diagnosis:   Encounter Diagnosis   Name Primary?    Impaired functional mobility, balance, and endurance      Physician: Shan Winters*    Visit Date: 1/4/2022    Physician: Shan Winters*    Physician Orders: PT Eval and Treat- [facility] [place]  Lower back pain, knee pain, Core strengthening, kinetic chain, gait training, eval and treat with modalities as needed  Medical Diagnosis from Referral: M54.50,G89.29 (ICD-10-CM) - Chronic bilateral low back pain without sciatica  Evaluation Date: 11/22/2021  Authorization Period Expiration: 11/10/22  Plan of Care Expiration: 2/22/2022                Progress Update: 1/22/2022                        Visit # / Visits authorized: 6 / 24                      FOTO: 2 / 3       PRECAUTIONS: Standard Precautions, Safety/fall precautions, Pulmonary: short of breath with exercises (getting breathing test next month), Diabetes: controlled, Cardiac/Vascular: CAD and Orthopedic: h/o Left Total Hip      MD Follow-up: no follow up    PTA Visit #: 1 /5     Time In: 1015  Time Out: 1100  Total Billable Time: 43 minutes    SUBJECTIVE     Pt reports:  He feels pretty good today.   His new year resolutions include getting rid of the cane so that he can walk normally.   Compliance with Hep: Every Other Day  Response to previous treatment: no adverse reactions to treatment/updated HEP  Functional change: Better    Pain: 4 /10   Worst: 6 /10  Location: low back    OBJECTIVE     Objective Measures updated at progress report unless specified otherwise.    Treatment     Gym/Equipment Access:[facility] received the treatments listed below:       THERAPEUTIC EXERCISES to develop strength, endurance, ROM, flexibility, and posture for (43) minutes including: x = exercises performed        TherEx 1/4/2022     ROM/Chondral: Nustep X 10   minutes, Level 4   UBE- trunk mobility  7 min, L1   Walking for endurance X x5 lengths total  - with obstacle avoidance, etc.   x2 Forward steps (small crystal x2, 6 inch box, x2 cones weaving, airEx beam step up and over)    x2 lateral steps (length of airEx beam, 6 inch box, small crystal x2, x2 cones weaving forward)   Lumbar mobility exercise    10 seconds holds x3 minutes  _________________  Trunk Rotations   Double Knee to Chest  Single knee to chest  Forward Flexion     **hep review only**    Endurance- Treadmill Walking x  0% incline  1.5 mph  3 minutes    Functional Circuit-   2 times through X Stair Climbing- 3 rounds of 4 steps  Step Taps- 1 minute  Sit to stands- 1 minute      Plan for Next Visit:         NEUROMUSCULAR RE-EDUCATION activities to improve: Coordination, Kinesthetic, Sense, and Proprioception for (0) minutes. The following activities were included: x = exercises performed     Neuro Re-Ed 1/4/2022    Transverse abdominus Bracing  Seated on [place] Disc, 10 second hold x10   Transverse abdominus + Arm Raises alternating  Seated on [place] Disc, 3 x 10   Transverse abdominus + Marching  Seated on [place] Disc, 3 x 10   Transverse abdominus + Bridging  5 seconds holds x 4 minutes   Transverse abdominus + Shuttle  double leg - 3 min- 4 bands  Single leg - 2 min each - 3 bands   Transverse abdominus + Slow sit to sands  Seated on [place] Disc2x 10 with slow controlled sit              BOLD= new this visit  Plan for Next Visit: review and progress as tolerated        PATIENT EDUCATION AND HOME EXERCISES      Education/Self-Care provided:  (included in treatment) minutes   · Patient educated on the impairments noted above and the effects of physical therapy intervention to improve overall condition and QOL.   · Patient was educated on all the above exercise prior/during/after for proper posture, positioning, and execution for safe performance with home exercise program.    · Exercise/Activity modifications:   ? 11/22/2021: EVAL: lumbar mobility     Written Home Exercises Provided: yes. Prefers: Printed Copies  Exercises were reviewed and [place] was able to demonstrate them prior to the end of the session.  [place] demonstrated good understanding of the education provided. See EMR under Patient Instructions for exercises provided during therapy sessions.      ASSESSMENT     Srinath Mcknight tolerated PT session well with minimal  complaints of pain or discomfort, secondary to weather change. Objective findings are improving with of range of motion endurance and functional mobility.  Therapy exercises were reviewed by revisiting exercises given from previous home exercise program while adding a walking circuit with obstacles.  Handouts were issued during today's visit. [place] demonstrated good understanding of new exercises and will continue to progress at home until next follow-up.       [place] is progressing well towards his goals.   Pt prognosis is Good.     Pt will continue to benefit from skilled outpatient physical therapy to address the deficits listed in the problem list box on initial evaluation, provide pt/family education and to maximize pt's level of independence in the home and community environment.     Pt's spiritual, cultural and educational needs considered and pt agreeable to plan of care and goals.    Anticipated Barriers for therapy: co-morbidities, sedentary lifestyle, chronicity of condition, lack of understanding of condition and adherence to treatment plan    GOALS:  SHORT TERM GOALS: 4 weeks, (12/22/21) 1/4/2022   1. Recent signs and systems trend is improving in order to progress towards LTG's.  MET   2. Patient will be independent with HEP in order to further progress and return to maximal function. MET    3. Pain rating at Worst: 5/10 in order to progress towards increased independence with activity.  PC   4. Patient will be able to correct  postural deviations in sitting and standing, to decrease pain and promote postural awareness for injury prevention.   MET      LONG TERM GOALS: 8 weeks, (1/22/22) 1/4/2022   1. Patient will return to normal ADL, recreational, and work related activities with less pain and limitation.  PC    2. Patient will improve AROM to stated goals in order to return to maximal functional potential.   PC   3. Patient will improve Strength to stated goals of appropriate musculature in order to improve functional independence.   PC   4. Pain Rating at Best: 1/10 to improve Quality of Life.   PC   5. Patient will meet predicted functional outcome (FOTO) score: 46% to increase self-worth & perceived functional ability. MET    Patient will have met/partially met personal goal of: Pts goals: Pt reported goals are to work on balance, walking distance, and endurance to help with shortness of breath  6.    PC      PC = progressing/continue  PM= partially met  DC= discontinue    PLAN     Plan of care Certification: 11/22/2021 to 2/22/2022     Continue Plan of Care (POC) and progress per patient tolerance. See Treatment section for exercise progression.    Gudelia Clark, PT

## 2022-01-05 ENCOUNTER — OFFICE VISIT (OUTPATIENT)
Dept: PULMONOLOGY | Facility: CLINIC | Age: 82
End: 2022-01-05
Payer: MEDICARE

## 2022-01-05 VITALS
DIASTOLIC BLOOD PRESSURE: 72 MMHG | RESPIRATION RATE: 18 BRPM | BODY MASS INDEX: 27.19 KG/M2 | HEART RATE: 84 BPM | WEIGHT: 218.69 LBS | SYSTOLIC BLOOD PRESSURE: 118 MMHG | HEIGHT: 75 IN | OXYGEN SATURATION: 99 %

## 2022-01-05 DIAGNOSIS — N18.30 DIABETES MELLITUS WITH STAGE 3 CHRONIC KIDNEY DISEASE: ICD-10-CM

## 2022-01-05 DIAGNOSIS — I70.0 AORTIC CALCIFICATION: Chronic | ICD-10-CM

## 2022-01-05 DIAGNOSIS — G47.52 CONTROLLED REM SLEEP BEHAVIOR DISORDER: ICD-10-CM

## 2022-01-05 DIAGNOSIS — R94.2 RESTRICTIVE VENTILATORY DEFECT: ICD-10-CM

## 2022-01-05 DIAGNOSIS — E11.69 COMBINED HYPERLIPIDEMIA ASSOCIATED WITH TYPE 2 DIABETES MELLITUS: Primary | Chronic | ICD-10-CM

## 2022-01-05 DIAGNOSIS — E11.22 DIABETES MELLITUS WITH STAGE 3 CHRONIC KIDNEY DISEASE: ICD-10-CM

## 2022-01-05 DIAGNOSIS — R94.2 DIFFUSION CAPACITY OF LUNG (DL), DECREASED: ICD-10-CM

## 2022-01-05 DIAGNOSIS — G47.33 OSA (OBSTRUCTIVE SLEEP APNEA): Chronic | ICD-10-CM

## 2022-01-05 DIAGNOSIS — E78.2 COMBINED HYPERLIPIDEMIA ASSOCIATED WITH TYPE 2 DIABETES MELLITUS: Primary | Chronic | ICD-10-CM

## 2022-01-05 DIAGNOSIS — I15.2 HYPERTENSION ASSOCIATED WITH DIABETES: ICD-10-CM

## 2022-01-05 DIAGNOSIS — E11.59 HYPERTENSION ASSOCIATED WITH DIABETES: ICD-10-CM

## 2022-01-05 PROCEDURE — 1159F PR MEDICATION LIST DOCUMENTED IN MEDICAL RECORD: ICD-10-PCS | Mod: HCNC,CPTII,S$GLB, | Performed by: INTERNAL MEDICINE

## 2022-01-05 PROCEDURE — 3074F SYST BP LT 130 MM HG: CPT | Mod: HCNC,CPTII,S$GLB, | Performed by: INTERNAL MEDICINE

## 2022-01-05 PROCEDURE — 1160F RVW MEDS BY RX/DR IN RCRD: CPT | Mod: HCNC,CPTII,S$GLB, | Performed by: INTERNAL MEDICINE

## 2022-01-05 PROCEDURE — 99499 UNLISTED E&M SERVICE: CPT | Mod: S$GLB,,, | Performed by: INTERNAL MEDICINE

## 2022-01-05 PROCEDURE — 1160F PR REVIEW ALL MEDS BY PRESCRIBER/CLIN PHARMACIST DOCUMENTED: ICD-10-PCS | Mod: HCNC,CPTII,S$GLB, | Performed by: INTERNAL MEDICINE

## 2022-01-05 PROCEDURE — 3288F PR FALLS RISK ASSESSMENT DOCUMENTED: ICD-10-PCS | Mod: HCNC,CPTII,S$GLB, | Performed by: INTERNAL MEDICINE

## 2022-01-05 PROCEDURE — 3288F FALL RISK ASSESSMENT DOCD: CPT | Mod: HCNC,CPTII,S$GLB, | Performed by: INTERNAL MEDICINE

## 2022-01-05 PROCEDURE — 99999 PR PBB SHADOW E&M-EST. PATIENT-LVL V: CPT | Mod: PBBFAC,HCNC,, | Performed by: INTERNAL MEDICINE

## 2022-01-05 PROCEDURE — 99214 OFFICE O/P EST MOD 30 MIN: CPT | Mod: HCNC,S$GLB,, | Performed by: INTERNAL MEDICINE

## 2022-01-05 PROCEDURE — 3072F LOW RISK FOR RETINOPATHY: CPT | Mod: HCNC,CPTII,S$GLB, | Performed by: INTERNAL MEDICINE

## 2022-01-05 PROCEDURE — 99214 PR OFFICE/OUTPT VISIT, EST, LEVL IV, 30-39 MIN: ICD-10-PCS | Mod: HCNC,S$GLB,, | Performed by: INTERNAL MEDICINE

## 2022-01-05 PROCEDURE — 3078F PR MOST RECENT DIASTOLIC BLOOD PRESSURE < 80 MM HG: ICD-10-PCS | Mod: HCNC,CPTII,S$GLB, | Performed by: INTERNAL MEDICINE

## 2022-01-05 PROCEDURE — 1101F PT FALLS ASSESS-DOCD LE1/YR: CPT | Mod: HCNC,CPTII,S$GLB, | Performed by: INTERNAL MEDICINE

## 2022-01-05 PROCEDURE — 3072F PR LOW RISK FOR RETINOPATHY: ICD-10-PCS | Mod: HCNC,CPTII,S$GLB, | Performed by: INTERNAL MEDICINE

## 2022-01-05 PROCEDURE — 3074F PR MOST RECENT SYSTOLIC BLOOD PRESSURE < 130 MM HG: ICD-10-PCS | Mod: HCNC,CPTII,S$GLB, | Performed by: INTERNAL MEDICINE

## 2022-01-05 PROCEDURE — 1101F PR PT FALLS ASSESS DOC 0-1 FALLS W/OUT INJ PAST YR: ICD-10-PCS | Mod: HCNC,CPTII,S$GLB, | Performed by: INTERNAL MEDICINE

## 2022-01-05 PROCEDURE — 99499 RISK ADDL DX/OHS AUDIT: ICD-10-PCS | Mod: S$GLB,,, | Performed by: INTERNAL MEDICINE

## 2022-01-05 PROCEDURE — 99999 PR PBB SHADOW E&M-EST. PATIENT-LVL V: ICD-10-PCS | Mod: PBBFAC,HCNC,, | Performed by: INTERNAL MEDICINE

## 2022-01-05 PROCEDURE — 1159F MED LIST DOCD IN RCRD: CPT | Mod: HCNC,CPTII,S$GLB, | Performed by: INTERNAL MEDICINE

## 2022-01-05 PROCEDURE — 3078F DIAST BP <80 MM HG: CPT | Mod: HCNC,CPTII,S$GLB, | Performed by: INTERNAL MEDICINE

## 2022-01-05 RX ORDER — REGADENOSON 0.08 MG/ML
INJECTION, SOLUTION INTRAVENOUS
COMMUNITY
Start: 2021-10-12

## 2022-01-05 RX ORDER — TALC
6 POWDER (GRAM) TOPICAL NIGHTLY
Qty: 30 TABLET | Refills: 11 | Status: SHIPPED | OUTPATIENT
Start: 2022-01-05 | End: 2022-02-04

## 2022-01-05 RX ORDER — INFLUENZA A VIRUS A/VICTORIA/2570/2019 IVR-215 (H1N1) ANTIGEN (FORMALDEHYDE INACTIVATED), INFLUENZA A VIRUS A/TASMANIA/503/2020 IVR-221 (H3N2) ANTIGEN (FORMALDEHYDE INACTIVATED), INFLUENZA B VIRUS B/PHUKET/3073/2013 ANTIGEN (FORMALDEHYDE INACTIVATED), AND INFLUENZA B VIRUS B/WASHINGTON/02/2019 ANTIGEN (FORMALDEHYDE INACTIVATED) 60; 60; 60; 60 UG/.7ML; UG/.7ML; UG/.7ML; UG/.7ML
INJECTION, SUSPENSION INTRAMUSCULAR
COMMUNITY
Start: 2021-10-13 | End: 2022-11-14

## 2022-01-05 RX ORDER — CLONAZEPAM 0.5 MG/1
0.5 TABLET ORAL NIGHTLY
Qty: 30 TABLET | Refills: 4 | Status: SHIPPED | OUTPATIENT
Start: 2022-01-05 | End: 2022-07-19
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
## 2022-01-11 ENCOUNTER — CLINICAL SUPPORT (OUTPATIENT)
Dept: REHABILITATION | Facility: HOSPITAL | Age: 82
End: 2022-01-11
Payer: MEDICARE

## 2022-01-11 DIAGNOSIS — Z74.09 IMPAIRED FUNCTIONAL MOBILITY, BALANCE, AND ENDURANCE: ICD-10-CM

## 2022-01-11 PROCEDURE — 97110 THERAPEUTIC EXERCISES: CPT | Mod: HCNC

## 2022-01-11 NOTE — PROGRESS NOTES
[facility]   Physical Therapy Treatment Note     Name: [place] [facility] Number: [id number]    Therapy Diagnosis:   Encounter Diagnosis   Name Primary?    Impaired functional mobility, balance, and endurance      Physician: Shan Winters*    Visit Date: 1/11/2022    Physician: Shna Winters*    Physician Orders: PT Eval and Treat- [facility]  Lower back pain, knee pain, Core strengthening, kinetic chain, gait training, eval and treat with modalities as needed  Medical Diagnosis from Referral: M54.50,G89.29 (ICD-10-CM) - Chronic bilateral low back pain without sciatica  Evaluation Date: 11/22/2021  Authorization Period Expiration: 11/10/22  Plan of Care Expiration: 2/22/2022                Progress Update: 1/22/2022                        Visit # / Visits authorized: 7 / 24                      FOTO: 2 / 3       PRECAUTIONS: Standard Precautions, Safety/fall precautions, Pulmonary: short of breath with exercises (getting breathing test next month), Diabetes: controlled, Cardiac/Vascular: CAD and Orthopedic: h/o Left Total Hip      MD Follow-up: no follow up    PTA Visit #: 0 /5     Time In: 1015 - progress note next visit / will hold on PT for   Time Out: 1100  Total Billable Time: 40 minutes    SUBJECTIVE     Pt reports:  He feels good today.  He has been doing his exercises daily- but notices that he gets fatigued with walking and biking more often.  Compliance with Hep: Daily  Response to previous treatment: no adverse reactions to treatment/updated HEP  Functional change: Better    Pain: 4 /10   Worst: 4 /10  Location: low back    OBJECTIVE     Objective Measures updated at progress report unless specified otherwise.    Treatment     Gym/Equipment Access:[facility] received the treatments listed below:       THERAPEUTIC EXERCISES to develop strength, endurance, ROM, flexibility, and posture for (40) minutes including: x = exercises performed         TherEx 1/11/2022     ROM/Chondral: Nustep  10  minutes, Level 4   ROM/Chondral: Bike X 10  minutes, Level 1   UBE- trunk mobility  7 min, L1   Hamstring Stretch on step X 30s x5   Walking for endurance  x5 lengths total  - with obstacle avoidance, etc.   x2 Forward steps (small crystal x2, 6 inch box, x2 cones weaving, airEx beam step up and over)    x2 lateral steps (length of airEx beam, 6 inch box, small crystal x2, x2 cones weaving forward)   Lumbar mobility exercise    10 seconds holds x3 minutes  _________________  Trunk Rotations   Double Knee to Chest  Single knee to chest  Forward Flexion     **hep review only**    Endurance- Treadmill Walking x  0% incline  1.5 mph  3 minutes    Functional Circuit-   2 times through x Stair Climbing- 5 rounds of 4 steps  Step Taps- 1 minute  Hip Extension- each- 30s  Hip Abduction- each- 30s  Sit to stands- 1 minute      Plan for Next Visit:         NEUROMUSCULAR RE-EDUCATION activities to improve: Coordination, Kinesthetic, Sense, and Proprioception for (0) minutes. The following activities were included: x = exercises performed     Neuro Re-Ed 1/11/2022    Transverse abdominus Bracing  Seated on Green Kiana Disc, 10 second hold x10   Transverse abdominus + Arm Raises alternating  Seated on Green [place] Disc, 3 x 10   Transverse abdominus + Marching  Seated on Green [place] Disc, 3 x 10   Transverse abdominus + Bridging  5 seconds holds x 4 minutes   Transverse abdominus + Shuttle  double leg - 3 min- 4 bands  Single leg - 2 min each - 3 bands   Transverse abdominus + Slow sit to sands  Seated on Green Kiana Disc2x 10 with slow controlled sit              BOLD= new this visit  Plan for Next Visit: review and progress as tolerated        PATIENT EDUCATION AND HOME EXERCISES      Education/Self-Care provided:  (included in treatment) minutes   · Patient educated on the impairments noted above and the effects of physical therapy intervention to improve overall condition and QOL.    · Patient was educated on all the above exercise prior/during/after for proper posture, positioning, and execution for safe performance with home exercise program.   · Exercise/Activity modifications:   ? 11/22/2021: EVAL: lumbar mobility     Written Home Exercises Provided: yes. Prefers: Printed Copies  Exercises were reviewed and [place] was able to demonstrate them prior to the end of the session.  [place] demonstrated good understanding of the education provided. See EMR under Patient Instructions for exercises provided during therapy sessions.      ASSESSMENT     Srinath Mcknight tolerated PT session well with minimal  complaints of pain or discomfort, secondary to weather change. Objective findings are improving with of range of motion endurance and functional mobility.  Therapy exercises were reviewed by revisiting exercises given from previous home exercise program while reviewing standing program for functional strengthening.   Handouts were not issued during today's visit. [place] demonstrated good understanding of new exercises and will continue to progress at home until next follow-up.       [place] is progressing well towards his goals.   Pt prognosis is Good.     Pt will continue to benefit from skilled outpatient physical therapy to address the deficits listed in the problem list box on initial evaluation, provide pt/family education and to maximize pt's level of independence in the home and community environment.     Pt's spiritual, cultural and educational needs considered and pt agreeable to plan of care and goals.    Anticipated Barriers for therapy: co-morbidities, sedentary lifestyle, chronicity of condition, lack of understanding of condition and adherence to treatment plan    GOALS:  SHORT TERM GOALS: 4 weeks, (12/22/21) 1/11/2022   1. Recent signs and systems trend is improving in order to progress towards LTG's.  MET   2. Patient will be independent with HEP in order to further  progress and return to maximal function. MET    3. Pain rating at Worst: 5/10 in order to progress towards increased independence with activity.  PC   4. Patient will be able to correct postural deviations in sitting and standing, to decrease pain and promote postural awareness for injury prevention.   MET      LONG TERM GOALS: 8 weeks, (1/22/22) 1/11/2022   1. Patient will return to normal ADL, recreational, and work related activities with less pain and limitation.  PC    2. Patient will improve AROM to stated goals in order to return to maximal functional potential.   PC   3. Patient will improve Strength to stated goals of appropriate musculature in order to improve functional independence.   PC   4. Pain Rating at Best: 1/10 to improve Quality of Life.   PC   5. Patient will meet predicted functional outcome (FOTO) score: 46% to increase self-worth & perceived functional ability. MET    Patient will have met/partially met personal goal of: Pts goals: Pt reported goals are to work on balance, walking distance, and endurance to help with shortness of breath  6.    PC      PC = progressing/continue  PM= partially met  DC= discontinue    PLAN     Plan of care Certification: 11/22/2021 to 2/22/2022     Continue Plan of Care (POC) and progress per patient tolerance. See Treatment section for exercise progression.    Gudelia Clark, PT

## 2022-01-18 ENCOUNTER — CLINICAL SUPPORT (OUTPATIENT)
Dept: REHABILITATION | Facility: HOSPITAL | Age: 82
End: 2022-01-18
Payer: MEDICARE

## 2022-01-18 DIAGNOSIS — Z74.09 IMPAIRED FUNCTIONAL MOBILITY, BALANCE, AND ENDURANCE: ICD-10-CM

## 2022-01-18 PROCEDURE — 97110 THERAPEUTIC EXERCISES: CPT | Mod: HCNC

## 2022-01-18 NOTE — PROGRESS NOTES
"[facility]   Physical Therapy Treatment + Discharge Note     Name: [facility] Number: [id number]    Therapy Diagnosis:   Encounter Diagnosis   Name Primary?    Impaired functional mobility, balance, and endurance      Physician: Shan Winters*    Visit Date: 1/18/2022    Physician: Shan Winters    Physician Orders: PT Eval and Treat- [facility]  Lower back pain, knee pain, Core strengthening, kinetic chain, gait training, eval and treat with modalities as needed  Medical Diagnosis from Referral: M54.50,G89.29 (ICD-10-CM) - Chronic bilateral low back pain without sciatica  Evaluation Date: 11/22/2021  Authorization Period Expiration: 11/10/22  Plan of Care Expiration: 2/22/2022                Progress Update: 1/22/2022                        Visit # / Visits authorized: 8 / 24                      FOTO: 3 / 3       PRECAUTIONS: Standard Precautions, Safety/fall precautions, Pulmonary: short of breath with exercises (getting breathing test next month), Diabetes: controlled, Cardiac/Vascular: CAD and Orthopedic: h/o Left Total Hip      MD Follow-up: no follow up    PTA Visit #: 0 /5     Time In: 1026   Time Out: 1058  Total Billable Time: 30 minutes    SUBJECTIVE     Pt reports:  He presents to therapy for his last visit for a while so he can complete respiratory therapy  Compliance with Hep: Daily  Response to previous treatment: Better  Functional change: 75% of PLOF  - Improvements: "everything", feels like his breathing is better, more mobility, more strength  - Challenges: fatigue with exercises, breathing (going to respiratory therapy soon)    Pain: 4 /10   Worst: 4 /10  Location: low back    OBJECTIVE     Objective Measures updated at progress report unless specified otherwise.    RANGE OF MOTION:     Lumbar AROM/PROM Spine  Right    Left       Pain/Dysfunction with Movement Goal   Lumbar Flexion (60) 55    no back pain  Hamstring stretching " 60  Initial: 50   Lumbar Extension (30) 25    slight discomfort 30  Initial: 15   Lumbar Side Bending (25) 25 25   25  Initial: 15B   Lumbar Rotation discomfort discomfort   Pain Free  Initial: discomfort      FUNCTIONAL SCREEN:     Lower Extremity Right Left Details   Hip Grossly 4+/5 Grossly 4+/5 No pain or discomfort   Knee Grossly 4+/5 Grossly 4/5 No pain or discomfort   Foot/Ankle Grossly 4+/5 Grossly 4+/5 No pain or discomfort         Rehab Tests  Outcome Norms GOAL   Five Time Sit to Stand 21.53 eval  15.54 s progress  19 s progress (fatgued) 60s: <11.4 sec  70s: <12.6 sec  80s: 14.8 sec <18 seconds   Timed Up and Go 14.83 s w/ cane (Ileana)     14.64 s w/o cane  10.61 s w/ cane    <13.5 sec            Treatment     Gym/Equipment Access:[facility] received the treatments listed below:       THERAPEUTIC EXERCISES to develop strength, endurance, ROM, flexibility, and posture for (30) minutes including: x = exercises performed      **Progress note testing included in overall time**     TherEx 1/18/2022     ROM/Chondral: Nustep  10  minutes, Level 4   ROM/Chondral: Bike X 10  minutes, Level 1   UBE- trunk mobility  7 min, L1   Hamstring Stretch on step  30s x5   Walking for endurance  x5 lengths total  - with obstacle avoidance, etc.   x2 Forward steps (small crystal x2, 6 inch box, x2 cones weaving, airEx beam step up and over)    x2 lateral steps (length of airEx beam, 6 inch box, small crystal x2, x2 cones weaving forward)   Lumbar mobility exercise    10 seconds holds x3 minutes  _________________  Trunk Rotations   Double Knee to Chest  Single knee to chest  Forward Flexion     **hep review only**    Endurance- Treadmill Walking  0% incline  1.5 mph  3 minutes    Functional Circuit-   2 times through x Step Ups (Forward)- x10 ea  Step Ups (lat)- x5 up and over  Heel Raises x20   Hip Extension- each- x10  Hip Abduction- each- x10  Sit to stands- 1 minute      Plan for Next Visit:         NEUROMUSCULAR  RE-EDUCATION activities to improve: Coordination, Kinesthetic, Sense, and Proprioception for (0) minutes. The following activities were included: x = exercises performed     Neuro Re-Ed 1/18/2022    Transverse abdominus Bracing  Seated on Green Kiana Disc, 10 second hold x10   Transverse abdominus + Arm Raises alternating  Seated on Green [place]iana Disc, 3 x 10   Transverse abdominus + Marching  Seated on Green [place] Disc, 3 x 10   Transverse abdominus + Bridging  5 seconds holds x 4 minutes   Transverse abdominus + Shuttle  double leg - 3 min- 4 bands  Single leg - 2 min each - 3 bands   Transverse abdominus + Slow sit to sands  Seated on Green [place] Disc2x 10 with slow controlled sit              BOLD= new this visit  Plan for Next Visit: review and progress as tolerated        PATIENT EDUCATION AND HOME EXERCISES      Education/Self-Care provided:  (included in treatment) minutes   · Patient educated on the impairments noted above and the effects of physical therapy intervention to improve overall condition and QOL.   · Patient was educated on all the above exercise prior/during/after for proper posture, positioning, and execution for safe performance with home exercise program.   · Exercise/Activity modifications:   ? 11/22/2021: EVAL: lumbar mobility     Written Home Exercises Provided: yes. Prefers: Printed Copies  Exercises were reviewed and [place] was able to demonstrate them prior to the end of the session.  [place] demonstrated good understanding of the education provided. See EMR under Patient Instructions for exercises provided during therapy sessions.      ASSESSMENT     [place] Juan Luis present to physical therapy today for final assessment an discharge.  [place] has met to partially all goals set at initial evaluation, unless listed below, and will continue to work at home towards personal goal(s) at of: to work on balance, walking distance, and endurance to help with shortness of breath  [place]  is independent with home exercise program and was given handouts throughout this episode of care to reference for continued wellness and physical fitness . Contact information was given to patient in case any questions arise in the future or if therapy is needed.      Discharge reason: Patient requested discharge and Other:  He is going to start respiratory therapy    Discharge FOTO Score  :     GOALS:  SHORT TERM GOALS: 4 weeks, (12/22/21) 1/18/2022   1. Recent signs and systems trend is improving in order to progress towards LTG's.  MET   2. Patient will be independent with HEP in order to further progress and return to maximal function. MET    3. Pain rating at Worst: 5/10 in order to progress towards increased independence with activity. MET   4. Patient will be able to correct postural deviations in sitting and standing, to decrease pain and promote postural awareness for injury prevention.   MET      LONG TERM GOALS: 8 weeks, (1/22/22) 1/18/2022   1. Patient will return to normal ADL, recreational, and work related activities with less pain and limitation.  PM   2. Patient will improve AROM to stated goals in order to return to maximal functional potential.  PM   3. Patient will improve Strength to stated goals of appropriate musculature in order to improve functional independence.  PM   4. Pain Rating at Best: 1/10 to improve Quality of Life.  PM   5. Patient will meet predicted functional outcome (FOTO) score: 46% to increase self-worth & perceived functional ability. MET    Patient will have met/partially met personal goal of: Pts goals: Pt reported goals are to work on balance, walking distance, and endurance to help with shortness of breath  6.   PM      PC = progressing/continue  PM= partially met  DC= discontinue    PLAN     Discharge today    Gudelia Clark, PT

## 2022-02-16 DIAGNOSIS — E11.69 COMBINED HYPERLIPIDEMIA ASSOCIATED WITH TYPE 2 DIABETES MELLITUS: ICD-10-CM

## 2022-02-16 DIAGNOSIS — E78.2 COMBINED HYPERLIPIDEMIA ASSOCIATED WITH TYPE 2 DIABETES MELLITUS: ICD-10-CM

## 2022-02-16 RX ORDER — EMPAGLIFLOZIN 10 MG/1
TABLET, FILM COATED ORAL
Qty: 30 TABLET | Refills: 3 | Status: SHIPPED | OUTPATIENT
Start: 2022-02-16 | End: 2022-06-16

## 2022-02-16 NOTE — TELEPHONE ENCOUNTER
Care Due:                  Date            Visit Type   Department     Provider  --------------------------------------------------------------------------------                                EP -                              PRIMARY      [facility] INTERNAL  Last Visit: 09-      CARE ([facility])   MEDICINE       Yeison Wilder  Next Visit: None Scheduled  None         None Found                                                            Last  Test          Frequency    Reason                     Performed    Due Date  --------------------------------------------------------------------------------    HBA1C.......  6 months...  JARDIANCE, metFORMIN.....  09- 03-    Uric Acid...  12 months..  allopurinoL..............  Not Found    Overdue    Powered by [employer]. Reference number: [id number].   2/16/2022 1:41:35 PM CST

## 2022-03-10 ENCOUNTER — TELEPHONE (OUTPATIENT)
Dept: PULMONOLOGY | Facility: CLINIC | Age: 82
End: 2022-03-10
Payer: MEDICARE

## 2022-03-10 NOTE — TELEPHONE ENCOUNTER
Spoke with pt. Advised him that based on his most recent PFT that he did not qualify for rehab at this time per Pulmonary rehab department. Pt verbalized understanding.

## 2022-03-10 NOTE — TELEPHONE ENCOUNTER
----- Message from Olamide Gayle sent at 3/10/2022 11:39 AM CST -----  Patient Call Back    Who Called: PT     What is the request in detail: Pt calling to speak with someone regarding his pulmonary rehab. The pt stated that [facility] already approved for him to have the services. Please call the pt regarding his concerns.    Can the clinic reply by [facility]?    Best Call Back Number: [phone]

## 2022-04-13 DIAGNOSIS — I15.2 HYPERTENSION ASSOCIATED WITH DIABETES: ICD-10-CM

## 2022-04-13 DIAGNOSIS — I70.0 AORTIC CALCIFICATION: Primary | ICD-10-CM

## 2022-04-13 DIAGNOSIS — E11.59 HYPERTENSION ASSOCIATED WITH DIABETES: ICD-10-CM

## 2022-04-13 NOTE — ASSESSMENT & PLAN NOTE
Anemia multifactorial including CKD, chronic disease    Laboratory review overall stable. Needs CBC, CMP. Will add to upcoming lab appointment 12/21/20.    Previously noted Vitamin B12 on the lower end of normal. He was asked to take sublingual Vitamin B12 1,000 mcg daily. He reports taking Vitamin B12/Iron pills daily    EGD/Colonoscopy done 6/2020. EGD revealed gastritis with pathology positive for H. Pylori. Colonoscopy reveal polyps with unremarkable pathology, recommended repeat in 5 years    Previous Referral to Nephrology for CKD III. Appointment cancelled due to COVID concerns. Plans to reschedule pending recent cmp results    Continue B12 supplementation. Continue iron supplement per patient preference. F/u 6 months with repeat labs     Pt scheduled

## 2022-04-14 ENCOUNTER — HOSPITAL ENCOUNTER (OUTPATIENT)
Dept: CARDIOLOGY | Facility: HOSPITAL | Age: 82
Discharge: HOME | End: 2022-04-14
Attending: STUDENT IN AN ORGANIZED HEALTH CARE EDUCATION/TRAINING PROGRAM
Payer: MEDICARE

## 2022-04-14 ENCOUNTER — OFFICE VISIT (OUTPATIENT)
Dept: CARDIOLOGY | Facility: CLINIC | Age: 82
End: 2022-04-14
Payer: MEDICARE

## 2022-04-14 VITALS
HEART RATE: 74 BPM | DIASTOLIC BLOOD PRESSURE: 62 MMHG | BODY MASS INDEX: 27.11 KG/M2 | WEIGHT: 218.06 LBS | OXYGEN SATURATION: 99 % | SYSTOLIC BLOOD PRESSURE: 118 MMHG | HEIGHT: 75 IN

## 2022-04-14 DIAGNOSIS — E11.59 HYPERTENSION ASSOCIATED WITH DIABETES: ICD-10-CM

## 2022-04-14 DIAGNOSIS — I15.2 HYPERTENSION ASSOCIATED WITH DIABETES: ICD-10-CM

## 2022-04-14 DIAGNOSIS — I70.0 AORTIC CALCIFICATION: Primary | ICD-10-CM

## 2022-04-14 DIAGNOSIS — G47.33 OSA (OBSTRUCTIVE SLEEP APNEA): ICD-10-CM

## 2022-04-14 DIAGNOSIS — E11.51 TYPE 2 DIABETES MELLITUS WITH DIABETIC PERIPHERAL ANGIOPATHY WITHOUT GANGRENE, WITHOUT LONG-TERM CURRENT USE OF INSULIN: ICD-10-CM

## 2022-04-14 DIAGNOSIS — M47.816 LUMBAR SPONDYLOSIS: ICD-10-CM

## 2022-04-14 DIAGNOSIS — E11.69 COMBINED HYPERLIPIDEMIA ASSOCIATED WITH TYPE 2 DIABETES MELLITUS: ICD-10-CM

## 2022-04-14 DIAGNOSIS — M47.26 OSTEOARTHRITIS OF SPINE WITH RADICULOPATHY, LUMBAR REGION: ICD-10-CM

## 2022-04-14 DIAGNOSIS — I70.0 AORTIC CALCIFICATION: ICD-10-CM

## 2022-04-14 DIAGNOSIS — E78.2 COMBINED HYPERLIPIDEMIA ASSOCIATED WITH TYPE 2 DIABETES MELLITUS: ICD-10-CM

## 2022-04-14 PROCEDURE — 3074F PR MOST RECENT SYSTOLIC BLOOD PRESSURE < 130 MM HG: ICD-10-PCS | Mod: CPTII,S$GLB,, | Performed by: STUDENT IN AN ORGANIZED HEALTH CARE EDUCATION/TRAINING PROGRAM

## 2022-04-14 PROCEDURE — 3078F PR MOST RECENT DIASTOLIC BLOOD PRESSURE < 80 MM HG: ICD-10-PCS | Mod: CPTII,S$GLB,, | Performed by: STUDENT IN AN ORGANIZED HEALTH CARE EDUCATION/TRAINING PROGRAM

## 2022-04-14 PROCEDURE — 93005 ELECTROCARDIOGRAM TRACING: CPT | Mod: PO

## 2022-04-14 PROCEDURE — 1126F AMNT PAIN NOTED NONE PRSNT: CPT | Mod: CPTII,S$GLB,, | Performed by: STUDENT IN AN ORGANIZED HEALTH CARE EDUCATION/TRAINING PROGRAM

## 2022-04-14 PROCEDURE — 99214 OFFICE O/P EST MOD 30 MIN: CPT | Mod: S$GLB,,, | Performed by: STUDENT IN AN ORGANIZED HEALTH CARE EDUCATION/TRAINING PROGRAM

## 2022-04-14 PROCEDURE — 93010 EKG 12-LEAD: ICD-10-PCS | Mod: ,,, | Performed by: INTERNAL MEDICINE

## 2022-04-14 PROCEDURE — 1159F PR MEDICATION LIST DOCUMENTED IN MEDICAL RECORD: ICD-10-PCS | Mod: CPTII,S$GLB,, | Performed by: STUDENT IN AN ORGANIZED HEALTH CARE EDUCATION/TRAINING PROGRAM

## 2022-04-14 PROCEDURE — 99999 PR PBB SHADOW E&M-EST. PATIENT-LVL V: CPT | Mod: PBBFAC,,, | Performed by: STUDENT IN AN ORGANIZED HEALTH CARE EDUCATION/TRAINING PROGRAM

## 2022-04-14 PROCEDURE — 99214 PR OFFICE/OUTPT VISIT, EST, LEVL IV, 30-39 MIN: ICD-10-PCS | Mod: S$GLB,,, | Performed by: STUDENT IN AN ORGANIZED HEALTH CARE EDUCATION/TRAINING PROGRAM

## 2022-04-14 PROCEDURE — 3078F DIAST BP <80 MM HG: CPT | Mod: CPTII,S$GLB,, | Performed by: STUDENT IN AN ORGANIZED HEALTH CARE EDUCATION/TRAINING PROGRAM

## 2022-04-14 PROCEDURE — 1159F MED LIST DOCD IN RCRD: CPT | Mod: CPTII,S$GLB,, | Performed by: STUDENT IN AN ORGANIZED HEALTH CARE EDUCATION/TRAINING PROGRAM

## 2022-04-14 PROCEDURE — 93010 ELECTROCARDIOGRAM REPORT: CPT | Mod: ,,, | Performed by: INTERNAL MEDICINE

## 2022-04-14 PROCEDURE — 3074F SYST BP LT 130 MM HG: CPT | Mod: CPTII,S$GLB,, | Performed by: STUDENT IN AN ORGANIZED HEALTH CARE EDUCATION/TRAINING PROGRAM

## 2022-04-14 PROCEDURE — 3072F PR LOW RISK FOR RETINOPATHY: ICD-10-PCS | Mod: CPTII,S$GLB,, | Performed by: STUDENT IN AN ORGANIZED HEALTH CARE EDUCATION/TRAINING PROGRAM

## 2022-04-14 PROCEDURE — 1126F PR PAIN SEVERITY QUANTIFIED, NO PAIN PRESENT: ICD-10-PCS | Mod: CPTII,S$GLB,, | Performed by: STUDENT IN AN ORGANIZED HEALTH CARE EDUCATION/TRAINING PROGRAM

## 2022-04-14 PROCEDURE — 99999 PR PBB SHADOW E&M-EST. PATIENT-LVL V: ICD-10-PCS | Mod: PBBFAC,,, | Performed by: STUDENT IN AN ORGANIZED HEALTH CARE EDUCATION/TRAINING PROGRAM

## 2022-04-14 PROCEDURE — 3072F LOW RISK FOR RETINOPATHY: CPT | Mod: CPTII,S$GLB,, | Performed by: STUDENT IN AN ORGANIZED HEALTH CARE EDUCATION/TRAINING PROGRAM

## 2022-04-14 NOTE — PROGRESS NOTES
Section of Cardiology                  Cardiac Clinic Note    Chief Complaint/Reason for consultation:  Decreased exercise tolerance      HPI:   Srinath Mcknight is a 81 y.o. male with h/o hypertension, diabetes, DJD, HLD, CAD, CKD who is here for follow-up.    11/18/20  Seen by Dr. Cobos.  Patient complained of 2-4 weeks of increased fatigue and dyspnea on exertion with moderate exertion.  Denies dyspnea at rest, orthopnea, PND, chest pain, palpitations, lower extremity edema.  For possible diastolic dysfunction, his losartan was increased to 50 mg b.i.d. and started on empagliflozin (jardiance) 10 mg daily.    9/15/21  He presents today c/o DAMON. Started about 6-7 months ago. Has been fairly stable. Says he can walk to the mailbox without SOB, but becomes SOB when he walks back. Says he can't exercise due to pain and SOB. Reports intermittent cough at times with some sputum production. Mild LE swelling. Sleeps on 1 pillow and denies orthopnea, PND. Reports occasional dizziness when he stands from a seated position.     He ambulates with a cane due to multiple surgeries on his back and hip. He is able to perform all of his ADLs. Denies syncope, palpitations, chest pain, fever, vomiting, blurry vision. Denies tobacco use, ETOH use, illicit drug use.       10/12/21  He reports feeling well. He is still getting short of breath with exertion. Does not get SOB at rest.  Denies chest pain, syncope, palpitations, PND, orthopnea. He has never smoked but has been around others who have smoked. He has been trying to exercise but gets short-winded within 5 minutes and would have to stop.         4/14/22  Still getting SOB. Has to stop to catch his breath. Able to walk a little further than before  Denies syncope, but feels that he has balancing issues. Denies tinnitus. Occurs when getting up from seated position    Does not want to wear the CPAP at night  Sees pulmonary, did not qualify for pulmonary rehab      Denies chest pain, syncope, orthopnea, LE swelling, palpitations, chest pain, fever, vomiting.         EKG 4/14/22 NSR, T-wave abnormality, consider inferolateral ischemia, 74 bpm,   EKG 8/26/21  NSR, T-wave abnormality, consider inferolateral ischemia  EKG 9/15/21 NSR, T-wave abnormality, inferolateral ischemia, 78 bpm,  ms, QTc 401 ms     ECHO 10/12/21  · The left ventricle is normal in size with concentric remodeling and normal systolic function.  · Normal left ventricular diastolic function.  · Normal right ventricular size with normal right ventricular systolic function.  · The estimated ejection fraction is 55%.  · Mild aortic regurgitation.       ECHO  2014  CONCLUSIONS     1 - Normal left ventricular systolic function (EF 55-60%).     2 - Normal left ventricular diastolic function.     3 - Normal right ventricular systolic function .     4 - Mild aortic regurgitation.     5 - Concentric remodeling.         STRESS TEST 10/12/21  Normal myocardial perfusion scan. There is no evidence of myocardial ischemia or infarction.    The gated perfusion images showed an ejection fraction of 47% at rest. The gated perfusion images showed an ejection fraction of 63% post stress.    The EKG portion of this study is uninterpretable due to significant baseline abnormalities    The patient reported no chest pain during the stress test.      STRESS TEST 2017  EKG Conclusions:   1. The EKG portion of this study is negative for ischemia at a moderate workload, and peak heart rate of 80 bpm (55% of predicted).   2. Blood pressure remained stable throughout the protocol  (Presenting BP: 119/70 Peak BP: 114/49).   3. No significant arrhythmias were present.   4. There were no symptoms of chest discomfort or significant dyspnea throughout the protocol.     Impression: NORMAL MYOCARDIAL PERFUSION   1. The perfusion scan is free of evidence for myocardial ischemia or injury.   2. Resting wall motion is physiologic.   3.  Resting LV function is normal.   4. The ventricular volumes are normal at rest and stress.   5. The extracardiac distribution of radioactivity is normal.         KAMALJIT 12/4/20 (Normal ABIs)  The right ankle [DT] artery has triphasic flow.   The right ankle [DP] artery has triphasic flow.   The left ankle [PT] artery has triphasic flow.  The left ankle [DP] artery has triphasic flow.    [facility]      ROS: All 10 systems reviewed. Please refer to the HPI for pertinent positives. All other systems negative.     Past Medical History  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    Trouble in sleeping     Type 2 diabetes mellitus with diabetic polyneuropathy, without long-term current use of insulin     Urinary incontinence     Uses hearing aid     bilat       Surgical History  Past Surgical History:   Procedure Laterality Date    BACK SURGERY  2019    COLONOSCOPY N/A 6/30/2020    Procedure: COLONOSCOPY;  Surgeon: Joseph Iraheta MD;  Location: [facility];  Service: Endoscopy;  Laterality: N/A;    ESOPHAGOGASTRODUODENOSCOPY N/A 6/30/2020    Procedure: EGD (ESOPHAGOGASTRODUODENOSCOPY);  Surgeon: Joseph Iraheta MD;  Location: [facility];  Service: Endoscopy;  Laterality: N/A;    HERNIA REPAIR Bilateral 04/18/2017    JOINT REPLACEMENT Left 10/09/2017    L YAHIR Alcocer    LAMINECTOMY  07/22/2016    left elbow   10/2017    procedure to remove gout    lumbar foraminotomy  03/2018    REVISION TOTAL HIP ARTHROPLASTY Left 9/11/2018    Procedure: REVISION, TOTAL ARTHROPLASTY, HIP;  Surgeon: Albaro Alcocer Sr., MD;  Location: [facility];  Service: Orthopedics;  Laterality: Left;    ROTATOR CUFF REPAIR Left 2006    SHOULDER ARTHROSCOPY W/ ROTATOR CUFF REPAIR Right 2009          Allergies:   Review of patient's allergies indicates:   Allergen Reactions    Sulfa (sulfonamide antibiotics)      Can't recall from 1995       Social History:  Social History     Socioeconomic History    Marital status:     Number of children: 0    Highest education level: Master's degree (e.g., MA, MS, Olegario, MEd, MSW, CARMEL)   Occupational History    Occupation: retired     Comment: teacher   Tobacco Use    Smoking status: Never Smoker    Smokeless tobacco: Never Used   Substance and Sexual Activity    Alcohol use: Yes     Alcohol/week: 1.0 standard drink     Types: 1 Glasses of wine per week     Comment: rarely  No alcohol prior to surgery    Drug use: No    Sexual activity: Yes     Partners: Female     Birth control/protection: Condom   Social History Narrative    . No children. Retired but some part time work - 4 hours a day. 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 Caffeine intake - sugar free cola, Rare coffee intake. Still drives. Does have a Living Will . Has a nephew Jt Gayle, lives in [place]. Has a friend Karina Rossi, locally who could help him.      Social Determinants of Health     Financial Resource Strain: Low Risk     Difficulty of Paying Living Expenses: Not hard at all   Food Insecurity: No Food Insecurity    Worried About Running Out of Food in the Last Year: Never true    Ran Out of Food in the Last Year: Never true   Transportation Needs: No Transportation Needs    Lack of Transportation (Medical): No    Lack of Transportation (Non-Medical): No   Physical Activity: Sufficiently Active    Days of Exercise  per Week: 3 days    Minutes of Exercise per Session: 60 min   Stress: No Stress Concern Present    Feeling of Stress : Not at all   Social Connections: Moderately Isolated    Frequency of Communication with Friends and Family: More than three times a week    Frequency of Social Gatherings with Friends and Family: Never    Attends Taoist Services: Never    Active Member of Clubs or Organizations: No    Attends Club or Organization Meetings: Never    Marital Status:    Housing Stability: Low Risk     Unable to Pay for Housing in the Last Year: No    Number of Places Lived in the Last Year: 1    Unstable Housing in the Last Year: No       Family History:  family history includes Cancer (age of onset: 50) in his brother; Diabetes in his father, mother, and sister; Drug abuse in his brother; Heart disease in his father and mother; Hypertension in his father and mother; Stroke in his father.    Home Medications:  Current Outpatient Medications on File Prior to Visit   Medication Sig Dispense Refill    ACCU-CHEK SOFTCLIX LANCETS Misc TEST ONE TIME DAILY 100 each 11    albuterol (PROVENTIL/VENTOLIN HFA) 90 mcg/actuation inhaler Inhale 2 puffs into the lungs every 4 (four) hours as needed for Shortness of Breath. Rescue 18 g 11    allopurinoL (ZYLOPRIM) 100 MG tablet TAKE 1 TABLET EVERY DAY 90 tablet 3    aspirin (ECOTRIN) 81 MG EC tablet Take 1 tablet (81 mg total) by mouth once daily.  0    bisacodyl (DULCOLAX) 10 mg Supp   0    blood sugar diagnostic (ACCU-CHEK FRANKO PLUS TEST STRP) Strp TEST BLOOD SUGAR EVERY  strip 11    blood-glucose meter (ACCU-CHEK FRANKO PLUS METER) Misc 1 Device by Misc.(Non-Drug; Combo Route) route once daily. 1 each 0    clonazePAM (KLONOPIN) 0.5 MG tablet Take 1 tablet (0.5 mg total) by mouth every evening. 30 tablet 4    cyanocobalamin, vitamin B-12, 1,000 mcg Subl Place 1,000 mcg under the tongue once daily. 90 tablet 3    ferrous sulfate 324 mg (65 mg  "iron) TbEC Take 1 tablet (324 mg total) by mouth once daily.      gabapentin (NEURONTIN) 600 MG tablet TAKE 1 TABLET(600 MG) BY MOUTH TWICE DAILY 180 tablet 2    GARLIC EXTRACT ORAL Take 1 tablet by mouth once daily. Per [facility]      JARDIANCE 10 mg tablet TAKE 1 TABLET(10 MG) BY MOUTH EVERY DAY 30 tablet 3    losartan (COZAAR) 50 MG tablet Take 2 tablets (100 mg total) by mouth once daily. 180 tablet 3    metFORMIN (GLUCOPHAGE) 1000 MG tablet Take 1 tablet (1,000 mg total) by mouth 2 (two) times daily with meals. 180 tablet 3    metoprolol succinate (TOPROL-XL) 50 MG 24 hr tablet Take 1 tablet (50 mg total) by mouth once daily. 90 tablet 3    multivitamin (THERAGRAN) per tablet Take 1 tablet by mouth once daily.      pravastatin (PRAVACHOL) 40 MG tablet TAKE 1 TABLET EVERY DAY 90 tablet 3    sildenafiL (VIAGRA) 100 MG tablet Take 1 tablet (100 mg total) by mouth daily as needed for Erectile Dysfunction. 30 tablet 2    tamsulosin (FLOMAX) 0.4 mg Cap Take 1 capsule (0.4 mg total) by mouth once daily. 90 capsule 3    cetirizine (ZYRTEC) 10 MG tablet Take 1 tablet (10 mg total) by mouth once daily. for 14 days 14 tablet 0    diclofenac sodium (VOLTAREN) 1 % Gel Apply 2 g topically 4 (four) times daily. 100 g 5    finasteride (PROSCAR) 5 mg tablet Take 1 tablet (5 mg total) by mouth once daily. (Patient not taking: Reported on 4/14/2022) 30 tablet 6    FLUZONE HIGHDOSE QUAD 21-22  mcg/0.7 mL Syrg       LEXISCAN 0.4 mg/5 mL Syrg       papaverine 30 mg/mL injection Inject 0.3 ml of trimix solution prn ED max once daily  Prepare 10ml of trimix solution containing:    Papaverine 30mg/ml    Phentolamine 1 mg/ml    Alprostadil 10mcg/ml  Dispense 10ml per refill  Qs syringes 1cc/30g/0.5" and alcohol swabs dispense as needed for Intracavernosal injection 10 mL 5     No current facility-administered medications on file prior to visit.       Physical exam:  /62 (BP Location: Right arm, " "Patient Position: Sitting, BP Method: Medium (Manual))   Pulse 74   Ht 6' 3" (1.905 m)   Wt 98.9 kg (218 lb 0.6 oz)   SpO2 99%   BMI 27.25 kg/m²       General: Pt is a 81 y.o. year old male who is AAOx3, in NAD, is pleasant, well nourished, looks stated age, walks with a cane  HEENT: PERRL, EOMI, Oral mucosa pink & moist  CVS  No abnormal cardiac pulsations noted on inspection. JVP not raised. The apical impulse is normal on palpation, and is located in the left 5th intercostal space in the mid - clavicular line. No palpable thrills or abnormal pulsations noted. RR, S1 - S2 heard, 1/6 systolic murmur at apex, rubs or gallops appreciated.   PUL : CTA B/L. No wheezes/crakles heard   ABD : BS +, soft. No tenderness elicited   LE : No C/C/E. Distal Pulses palpable B/L         LABS:    Chemistry:   Lab Results   Component Value Date     09/17/2021    K 4.9 09/17/2021     09/17/2021    CO2 24 09/17/2021    BUN 19 09/17/2021    CREATININE 1.4 09/17/2021    CALCIUM 9.3 09/17/2021     Cardiac Markers:   Lab Results   Component Value Date    TROPONINI <0.006 03/10/2015     Cardiac Markers (Last 3):   Lab Results   Component Value Date    TROPONINI <0.006 03/10/2015     CBC:   Lab Results   Component Value Date    WBC 4.63 09/17/2021    HGB 12.4 (L) 09/17/2021    HCT 38.6 (L) 09/17/2021    MCV 91 09/17/2021     09/17/2021     Lipids:   Lab Results   Component Value Date    CHOL 143 09/17/2021    TRIG 170 (H) 09/17/2021    HDL 30 (L) 09/17/2021     Coagulation:   Lab Results   Component Value Date    INR 1.0 07/31/2019    APTT 26.2 07/31/2019           Assessment    1. Aortic calcification    2. Type 2 diabetes mellitus with diabetic peripheral angiopathy without gangrene, without long-term current use of insulin    3. Hypertension associated with diabetes    4. CHARLES (obstructive sleep apnea)    5. Combined hyperlipidemia associated with type 2 diabetes mellitus    6. Lumbar spondylosis    7. " Osteoarthritis of spine with radiculopathy, lumbar region          Plan:    Dyspnea on exertion- stable   Possibly due to deconditioning- will try to get back into the gym  Echocardiogram with normal EF, mild AI (unchanged from prior)  Pharmacologic nuclear stress test without evidence of ischemia  F/u with pulmonology     CAD  H/o calcified aorta  Denies angina  Continue aspirin    HLD  LDL 79 as of 9/17/21  Continue pravastatin 40 mg daily    HTN  Stable  Continue losartan 50 mg b.i.d., continue metoprolol 50 mg daily    Diabetes  Stable, A1c 6.2  Continue therapy     Arthritis  Chronic back pain- try tylenol   Patient does not take pain medications  Ambulates with a cane          I have reviewed all pertinent chart information.  Plans and recommendations have been formulated under my direct supervision. All questions answered and patient voiced understanding.   If symptoms persist go to the ED.    RTC in 6 months       Yasmine Calvillo MD  Cardiology

## 2022-05-23 ENCOUNTER — OFFICE VISIT (OUTPATIENT)
Dept: URGENT CARE | Facility: CLINIC | Age: 82
End: 2022-05-23
Payer: MEDICARE

## 2022-05-23 VITALS
HEART RATE: 97 BPM | DIASTOLIC BLOOD PRESSURE: 59 MMHG | SYSTOLIC BLOOD PRESSURE: 106 MMHG | WEIGHT: 218 LBS | OXYGEN SATURATION: 98 % | TEMPERATURE: 99 F | BODY MASS INDEX: 27.1 KG/M2 | HEIGHT: 75 IN | RESPIRATION RATE: 18 BRPM

## 2022-05-23 DIAGNOSIS — G89.29 CHRONIC LEFT-SIDED LOW BACK PAIN WITHOUT SCIATICA: Primary | ICD-10-CM

## 2022-05-23 DIAGNOSIS — L29.9 ITCHING: ICD-10-CM

## 2022-05-23 DIAGNOSIS — M54.50 CHRONIC LEFT-SIDED LOW BACK PAIN WITHOUT SCIATICA: Primary | ICD-10-CM

## 2022-05-23 PROCEDURE — 1125F AMNT PAIN NOTED PAIN PRSNT: CPT | Mod: CPTII,S$GLB,, | Performed by: PHYSICIAN ASSISTANT

## 2022-05-23 PROCEDURE — 3074F PR MOST RECENT SYSTOLIC BLOOD PRESSURE < 130 MM HG: ICD-10-PCS | Mod: CPTII,S$GLB,, | Performed by: PHYSICIAN ASSISTANT

## 2022-05-23 PROCEDURE — 1160F PR REVIEW ALL MEDS BY PRESCRIBER/CLIN PHARMACIST DOCUMENTED: ICD-10-PCS | Mod: CPTII,S$GLB,, | Performed by: PHYSICIAN ASSISTANT

## 2022-05-23 PROCEDURE — 1125F PR PAIN SEVERITY QUANTIFIED, PAIN PRESENT: ICD-10-PCS | Mod: CPTII,S$GLB,, | Performed by: PHYSICIAN ASSISTANT

## 2022-05-23 PROCEDURE — 1160F RVW MEDS BY RX/DR IN RCRD: CPT | Mod: CPTII,S$GLB,, | Performed by: PHYSICIAN ASSISTANT

## 2022-05-23 PROCEDURE — 3078F DIAST BP <80 MM HG: CPT | Mod: CPTII,S$GLB,, | Performed by: PHYSICIAN ASSISTANT

## 2022-05-23 PROCEDURE — 3072F PR LOW RISK FOR RETINOPATHY: ICD-10-PCS | Mod: CPTII,S$GLB,, | Performed by: PHYSICIAN ASSISTANT

## 2022-05-23 PROCEDURE — 1159F MED LIST DOCD IN RCRD: CPT | Mod: CPTII,S$GLB,, | Performed by: PHYSICIAN ASSISTANT

## 2022-05-23 PROCEDURE — 99213 PR OFFICE/OUTPT VISIT, EST, LEVL III, 20-29 MIN: ICD-10-PCS | Mod: S$GLB,,, | Performed by: PHYSICIAN ASSISTANT

## 2022-05-23 PROCEDURE — 3072F LOW RISK FOR RETINOPATHY: CPT | Mod: CPTII,S$GLB,, | Performed by: PHYSICIAN ASSISTANT

## 2022-05-23 PROCEDURE — 3078F PR MOST RECENT DIASTOLIC BLOOD PRESSURE < 80 MM HG: ICD-10-PCS | Mod: CPTII,S$GLB,, | Performed by: PHYSICIAN ASSISTANT

## 2022-05-23 PROCEDURE — 1159F PR MEDICATION LIST DOCUMENTED IN MEDICAL RECORD: ICD-10-PCS | Mod: CPTII,S$GLB,, | Performed by: PHYSICIAN ASSISTANT

## 2022-05-23 PROCEDURE — 3074F SYST BP LT 130 MM HG: CPT | Mod: CPTII,S$GLB,, | Performed by: PHYSICIAN ASSISTANT

## 2022-05-23 PROCEDURE — 99213 OFFICE O/P EST LOW 20 MIN: CPT | Mod: S$GLB,,, | Performed by: PHYSICIAN ASSISTANT

## 2022-05-23 RX ORDER — NAPROXEN 500 MG/1
500 TABLET ORAL 2 TIMES DAILY WITH MEALS
Qty: 20 TABLET | Refills: 0 | Status: SHIPPED | OUTPATIENT
Start: 2022-05-23 | End: 2022-06-02

## 2022-05-23 NOTE — PROGRESS NOTES
"Subjective:       Patient ID: Srinath Mcknight is a 81 y.o. male.    Vitals:  height is 6' 3" (1.905 m) and weight is 98.9 kg (218 lb). His temperature is 98.6 °F (37 °C). His blood pressure is 106/59 (abnormal) and his pulse is 97. His respiration is 18 and oxygen saturation is 98%.     Chief Complaint: Back Pain    Patient is a 81-year-old male who presents for evaluation of left-sided back pain.  Patient states he has had low back pain for many years now and had several surgeries to correct this.  Patient states he has ongoing low back pain but is not having pain on the left side as well.  Patient states he has had left-sided back pain for over 1 month now.  Patient denies any numbness, tingling, radiation, skin changes, injury, bowel or bladder incontinence.  Patient states he has tried Tylenol without relief of pain.  Patient denies any dysuria or hematuria.  Patient states he has not had any abdominal pain.  Patient states he does have itching on his back in abdomen at night.  Patient denies any rashes associated.  Patient has not used any new or changing detergents or topical ointments.  Patient has not tried anything for the itching.    Back Pain  This is a new problem. The current episode started more than 1 month ago. The problem occurs constantly. The problem is unchanged. The pain is present in the lumbar spine. The quality of the pain is described as aching. Radiates to: left side. The pain is at a severity of 6/10. The symptoms are aggravated by bending. Stiffness is present in the morning. Pertinent negatives include no abdominal pain, bladder incontinence, bowel incontinence, chest pain, dysuria, fever, headaches, leg pain, numbness, paresis, paresthesias, pelvic pain, perianal numbness, tingling, weakness or weight loss. Risk factors include sedentary lifestyle. He has tried analgesics for the symptoms. The treatment provided no relief.       Constitution: Negative for chills and fever.   Neck: " Positive for degenerative disc disease and bulging disc disease. Negative for neck pain and neck stiffness.   Cardiovascular: Negative for chest pain.   Eyes: Negative for vision loss.   Respiratory: Negative for cough, shortness of breath and wheezing.    Gastrointestinal: Negative for abdominal pain, nausea, vomiting and bowel incontinence.   Genitourinary: Negative for dysuria, bladder incontinence and pelvic pain.   Musculoskeletal: Positive for back pain, pain with walking, muscle ache and history of spine disorder. Negative for pain, trauma, joint pain, joint swelling, abnormal ROM of joint and muscle cramps.   Skin: Negative for hives.   Allergic/Immunologic: Positive for itching. Negative for hives.   Neurological: Negative for headaches, numbness and tingling.       Objective:      Physical Exam   Constitutional: He is oriented to person, place, and time. He appears well-developed. He is cooperative. No distress.   HENT:   Head: Normocephalic and atraumatic.   Nose: Nose normal.   Mouth/Throat: Oropharynx is clear and moist and mucous membranes are normal.   Eyes: Conjunctivae and lids are normal.   Neck: Trachea normal and phonation normal. Neck supple. No neck rigidity present.   Cardiovascular: Normal rate, regular rhythm, normal heart sounds and normal pulses.   No murmur heard.  Pulmonary/Chest: Effort normal and breath sounds normal. No respiratory distress. He has no wheezes.   Abdominal: Normal appearance and bowel sounds are normal. He exhibits no distension, no abdominal bruit, no pulsatile midline mass and no mass. Soft. There is no abdominal tenderness.   Musculoskeletal:         General: No deformity.      Cervical back: He exhibits no tenderness.      Lumbar back: He exhibits tenderness. He exhibits normal range of motion, no bony tenderness, no swelling, no edema, no deformity, no laceration and no spasm.        Back:       Comments: Mild tenderness palpation in circular area as negative.  No  midline bony tenderness appreciated no bony step-off.  No overlying skin changes including rash or hives.  Muscular tightness felt in the skin.  Linear line represents well-healed incision from previous surgeries.  No overlying skin changes or drainage noted.  Nontender to palpation.  Lower extremity strength 5/5.  Sensation intact bilaterally.   Neurological: He is alert and oriented to person, place, and time. He has normal strength and normal reflexes. No sensory deficit.   Skin: Skin is warm, dry, intact and not diaphoretic.   Psychiatric: His speech is normal and behavior is normal. Judgment and thought content normal.   Nursing note and vitals reviewed.        Assessment:       1. Chronic left-sided low back pain without sciatica    2. Itching          Plan:         Chronic left-sided low back pain without sciatica  -     naproxen (NAPROSYN) 500 MG tablet; Take 1 tablet (500 mg total) by mouth 2 (two) times daily with meals. for 10 days  Dispense: 20 tablet; Refill: 0    Itching    Discussed chronic back pain can cause other pains and aches given changing gait.  Discussed use of naproxen only as prescribed.  Do not use over-the-counter NSAIDs.  Only short course at this time to help with increased back pain.  Discussed need for follow-up with PCP as physical therapy may be indicated at this time.  Discussed itching at night time could be associated with dry skin is patient has dry flaky skin throughout.  Discussed use of moisturizer daily as well as Zyrtec before bed.  Discussed hot water can cause dryness of skin.  Discussed strict ER versus return to clinic precautions worsening or changing symptoms.  Patient verbalized understanding and agrees with plan.    Gudelia Lawrence PA-C    Patient Instructions   Muscle strain (Back Spasm)  Please return here or go to the Emergency Department for any concerns or worsening of condition.  If you were prescribed a narcotic medication, do not drive or operate heavy  equipment or machinery while taking these medications.  Warm compresses to affected area three times a day.  Mild back stretching as discussed.  Avoid any heavy lifting with current symptoms.  Please follow up with your primary care doctor or specialist in the next 48-72hrs if no improvement.     See below Occupational Medicine Referral for follow up and management of your condition.  If you  smoke, please stop smoking.    If your condition worsens or fails to improve we recommend that you receive another evaluation at the ER immediately or contact your PCP to discuss your concerns or return here. You must understand that you've received an urgent care treatment only and that you may be released before all your medical problems are known or treated. You the patient will arrange for followup care as instructed.   Increase fluids and rest are important  Apply topical steroid cream as prescribed.  Do not take over 5 days.    Please take over the counter Pepcid daily as directed for the next 48-72 hours as needed.  Please take Claritin or Zyrtec or Allegra (24 hours) daily for the next 48-72 hrs.  You can add Benadryl  as needed for itching, however these may make you drowsy, so do not  drive or operate heavy equipment or machinery while taking these medications.    If you develop additional symptoms such as tongue swelling or trouble breathing go immediately to the ER.

## 2022-05-23 NOTE — PATIENT INSTRUCTIONS
Muscle strain (Back Spasm)  Please return here or go to the Emergency Department for any concerns or worsening of condition.  If you were prescribed a narcotic medication, do not drive or operate heavy equipment or machinery while taking these medications.  Warm compresses to affected area three times a day.  Mild back stretching as discussed.  Avoid any heavy lifting with current symptoms.  Please follow up with your primary care doctor or specialist in the next 48-72hrs if no improvement.     See below Occupational Medicine Referral for follow up and management of your condition.  If you  smoke, please stop smoking.    If your condition worsens or fails to improve we recommend that you receive another evaluation at the ER immediately or contact your PCP to discuss your concerns or return here. You must understand that you've received an urgent care treatment only and that you may be released before all your medical problems are known or treated. You the patient will arrange for followup care as instructed.   Increase fluids and rest are important  Apply topical steroid cream as prescribed.  Do not take over 5 days.    Please take over the counter Pepcid daily as directed for the next 48-72 hours as needed.  Please take Claritin or Zyrtec or Allegra (24 hours) daily for the next 48-72 hrs.  You can add Benadryl  as needed for itching, however these may make you drowsy, so do not  drive or operate heavy equipment or machinery while taking these medications.    If you develop additional symptoms such as tongue swelling or trouble breathing go immediately to the ER.

## 2022-05-26 ENCOUNTER — TELEPHONE (OUTPATIENT)
Dept: URGENT CARE | Facility: CLINIC | Age: 82
End: 2022-05-26
Payer: MEDICARE

## 2022-06-01 ENCOUNTER — LAB VISIT (OUTPATIENT)
Dept: LAB | Facility: HOSPITAL | Age: 82
End: 2022-06-01
Attending: NURSE PRACTITIONER
Payer: MEDICARE

## 2022-06-01 DIAGNOSIS — D64.9 CHRONIC ANEMIA: ICD-10-CM

## 2022-06-01 LAB
ALBUMIN SERPL BCP-MCNC: 3.6 G/DL (ref 3.5–5.2)
ALP SERPL-CCNC: 58 U/L (ref 55–135)
ALT SERPL W/O P-5'-P-CCNC: 18 U/L (ref 10–44)
ANION GAP SERPL CALC-SCNC: 14 MMOL/L (ref 8–16)
AST SERPL-CCNC: 20 U/L (ref 10–40)
BASOPHILS # BLD AUTO: 0.04 K/UL (ref 0–0.2)
BASOPHILS NFR BLD: 0.9 % (ref 0–1.9)
BILIRUB SERPL-MCNC: 0.4 MG/DL (ref 0.1–1)
BUN SERPL-MCNC: 22 MG/DL (ref 8–23)
CALCIUM SERPL-MCNC: 8.7 MG/DL (ref 8.7–10.5)
CHLORIDE SERPL-SCNC: 109 MMOL/L (ref 95–110)
CO2 SERPL-SCNC: 21 MMOL/L (ref 23–29)
CREAT SERPL-MCNC: 1.7 MG/DL (ref 0.5–1.4)
DIFFERENTIAL METHOD: ABNORMAL
EOSINOPHIL # BLD AUTO: 0.6 K/UL (ref 0–0.5)
EOSINOPHIL NFR BLD: 12.9 % (ref 0–8)
ERYTHROCYTE [DISTWIDTH] IN BLOOD BY AUTOMATED COUNT: 14.6 % (ref 11.5–14.5)
EST. GFR  (AFRICAN AMERICAN): 43 ML/MIN/1.73 M^2
EST. GFR  (NON AFRICAN AMERICAN): 38 ML/MIN/1.73 M^2
FERRITIN SERPL-MCNC: 60 NG/ML (ref 20–300)
GLUCOSE SERPL-MCNC: 113 MG/DL (ref 70–110)
HCT VFR BLD AUTO: 37.5 % (ref 40–54)
HGB BLD-MCNC: 12.7 G/DL (ref 14–18)
IMM GRANULOCYTES # BLD AUTO: 0.03 K/UL (ref 0–0.04)
IMM GRANULOCYTES NFR BLD AUTO: 0.7 % (ref 0–0.5)
IRON SERPL-MCNC: 110 UG/DL (ref 45–160)
LYMPHOCYTES # BLD AUTO: 1.6 K/UL (ref 1–4.8)
LYMPHOCYTES NFR BLD: 34.4 % (ref 18–48)
MCH RBC QN AUTO: 29.5 PG (ref 27–31)
MCHC RBC AUTO-ENTMCNC: 33.9 G/DL (ref 32–36)
MCV RBC AUTO: 87 FL (ref 82–98)
MONOCYTES # BLD AUTO: 0.5 K/UL (ref 0.3–1)
MONOCYTES NFR BLD: 9.8 % (ref 4–15)
NEUTROPHILS # BLD AUTO: 1.9 K/UL (ref 1.8–7.7)
NEUTROPHILS NFR BLD: 41.3 % (ref 38–73)
NRBC BLD-RTO: 0 /100 WBC
PLATELET # BLD AUTO: 173 K/UL (ref 150–450)
PMV BLD AUTO: 10 FL (ref 9.2–12.9)
POTASSIUM SERPL-SCNC: 4.8 MMOL/L (ref 3.5–5.1)
PROT SERPL-MCNC: 6.7 G/DL (ref 6–8.4)
RBC # BLD AUTO: 4.3 M/UL (ref 4.6–6.2)
SATURATED IRON: 39 % (ref 20–50)
SODIUM SERPL-SCNC: 144 MMOL/L (ref 136–145)
TOTAL IRON BINDING CAPACITY: 280 UG/DL (ref 250–450)
TRANSFERRIN SERPL-MCNC: 189 MG/DL (ref 200–375)
WBC # BLD AUTO: 4.57 K/UL (ref 3.9–12.7)

## 2022-06-01 PROCEDURE — 86334 IMMUNOFIX E-PHORESIS SERUM: CPT | Mod: 26,,, | Performed by: PATHOLOGY

## 2022-06-01 PROCEDURE — 86334 PATHOLOGIST INTERPRETATION IFE: ICD-10-PCS | Mod: 26,,, | Performed by: PATHOLOGY

## 2022-06-01 PROCEDURE — 80053 COMPREHEN METABOLIC PANEL: CPT | Performed by: NURSE PRACTITIONER

## 2022-06-01 PROCEDURE — 84165 PATHOLOGIST INTERPRETATION SPE: ICD-10-PCS | Mod: 26,,, | Performed by: PATHOLOGY

## 2022-06-01 PROCEDURE — 85025 COMPLETE CBC W/AUTO DIFF WBC: CPT | Performed by: NURSE PRACTITIONER

## 2022-06-01 PROCEDURE — 84165 PROTEIN E-PHORESIS SERUM: CPT | Performed by: NURSE PRACTITIONER

## 2022-06-01 PROCEDURE — 82728 ASSAY OF FERRITIN: CPT | Performed by: NURSE PRACTITIONER

## 2022-06-01 PROCEDURE — 84165 PROTEIN E-PHORESIS SERUM: CPT | Mod: 26,,, | Performed by: PATHOLOGY

## 2022-06-01 PROCEDURE — 86334 IMMUNOFIX E-PHORESIS SERUM: CPT | Performed by: NURSE PRACTITIONER

## 2022-06-01 PROCEDURE — 84466 ASSAY OF TRANSFERRIN: CPT | Performed by: NURSE PRACTITIONER

## 2022-06-01 PROCEDURE — 36415 COLL VENOUS BLD VENIPUNCTURE: CPT | Performed by: NURSE PRACTITIONER

## 2022-06-01 PROCEDURE — 83520 IMMUNOASSAY QUANT NOS NONAB: CPT | Mod: 59 | Performed by: NURSE PRACTITIONER

## 2022-06-02 LAB
ALBUMIN SERPL ELPH-MCNC: 3.62 G/DL (ref 3.35–5.55)
ALPHA1 GLOB SERPL ELPH-MCNC: 0.22 G/DL (ref 0.17–0.41)
ALPHA2 GLOB SERPL ELPH-MCNC: 0.62 G/DL (ref 0.43–0.99)
B-GLOBULIN SERPL ELPH-MCNC: 0.59 G/DL (ref 0.5–1.1)
GAMMA GLOB SERPL ELPH-MCNC: 0.94 G/DL (ref 0.67–1.58)
INTERPRETATION SERPL IFE-IMP: NORMAL
KAPPA LC SER QL IA: 3.8 MG/DL (ref 0.33–1.94)
KAPPA LC/LAMBDA SER IA: 1.5 (ref 0.26–1.65)
LAMBDA LC SER QL IA: 2.54 MG/DL (ref 0.57–2.63)
PATHOLOGIST INTERPRETATION IFE: NORMAL
PATHOLOGIST INTERPRETATION SPE: NORMAL
PROT SERPL-MCNC: 6 G/DL (ref 6–8.4)

## 2022-06-03 ENCOUNTER — OFFICE VISIT (OUTPATIENT)
Dept: HEMATOLOGY/ONCOLOGY | Facility: CLINIC | Age: 82
End: 2022-06-03
Payer: MEDICARE

## 2022-06-03 VITALS
HEIGHT: 75 IN | SYSTOLIC BLOOD PRESSURE: 121 MMHG | TEMPERATURE: 97 F | WEIGHT: 219.81 LBS | BODY MASS INDEX: 27.33 KG/M2 | HEART RATE: 76 BPM | OXYGEN SATURATION: 97 % | DIASTOLIC BLOOD PRESSURE: 72 MMHG

## 2022-06-03 DIAGNOSIS — N18.31 STAGE 3A CHRONIC KIDNEY DISEASE: ICD-10-CM

## 2022-06-03 DIAGNOSIS — D64.9 ANEMIA DUE TO UNKNOWN MECHANISM: ICD-10-CM

## 2022-06-03 DIAGNOSIS — D64.9 CHRONIC ANEMIA: Primary | ICD-10-CM

## 2022-06-03 DIAGNOSIS — R76.8 ELEVATED SERUM IMMUNOGLOBULIN FREE LIGHT CHAIN LEVEL: ICD-10-CM

## 2022-06-03 PROCEDURE — 3078F DIAST BP <80 MM HG: CPT | Mod: CPTII,S$GLB,, | Performed by: NURSE PRACTITIONER

## 2022-06-03 PROCEDURE — 3288F FALL RISK ASSESSMENT DOCD: CPT | Mod: CPTII,S$GLB,, | Performed by: NURSE PRACTITIONER

## 2022-06-03 PROCEDURE — 3078F PR MOST RECENT DIASTOLIC BLOOD PRESSURE < 80 MM HG: ICD-10-PCS | Mod: CPTII,S$GLB,, | Performed by: NURSE PRACTITIONER

## 2022-06-03 PROCEDURE — 1126F PR PAIN SEVERITY QUANTIFIED, NO PAIN PRESENT: ICD-10-PCS | Mod: CPTII,S$GLB,, | Performed by: NURSE PRACTITIONER

## 2022-06-03 PROCEDURE — 3288F PR FALLS RISK ASSESSMENT DOCUMENTED: ICD-10-PCS | Mod: CPTII,S$GLB,, | Performed by: NURSE PRACTITIONER

## 2022-06-03 PROCEDURE — 1160F RVW MEDS BY RX/DR IN RCRD: CPT | Mod: CPTII,S$GLB,, | Performed by: NURSE PRACTITIONER

## 2022-06-03 PROCEDURE — 1126F AMNT PAIN NOTED NONE PRSNT: CPT | Mod: CPTII,S$GLB,, | Performed by: NURSE PRACTITIONER

## 2022-06-03 PROCEDURE — 99499 UNLISTED E&M SERVICE: CPT | Mod: S$GLB,,, | Performed by: NURSE PRACTITIONER

## 2022-06-03 PROCEDURE — 3072F PR LOW RISK FOR RETINOPATHY: ICD-10-PCS | Mod: CPTII,S$GLB,, | Performed by: NURSE PRACTITIONER

## 2022-06-03 PROCEDURE — 1101F PR PT FALLS ASSESS DOC 0-1 FALLS W/OUT INJ PAST YR: ICD-10-PCS | Mod: CPTII,S$GLB,, | Performed by: NURSE PRACTITIONER

## 2022-06-03 PROCEDURE — 1101F PT FALLS ASSESS-DOCD LE1/YR: CPT | Mod: CPTII,S$GLB,, | Performed by: NURSE PRACTITIONER

## 2022-06-03 PROCEDURE — 99213 OFFICE O/P EST LOW 20 MIN: CPT | Mod: S$GLB,,, | Performed by: NURSE PRACTITIONER

## 2022-06-03 PROCEDURE — 1160F PR REVIEW ALL MEDS BY PRESCRIBER/CLIN PHARMACIST DOCUMENTED: ICD-10-PCS | Mod: CPTII,S$GLB,, | Performed by: NURSE PRACTITIONER

## 2022-06-03 PROCEDURE — 99999 PR PBB SHADOW E&M-EST. PATIENT-LVL IV: CPT | Mod: PBBFAC,,, | Performed by: NURSE PRACTITIONER

## 2022-06-03 PROCEDURE — 1159F PR MEDICATION LIST DOCUMENTED IN MEDICAL RECORD: ICD-10-PCS | Mod: CPTII,S$GLB,, | Performed by: NURSE PRACTITIONER

## 2022-06-03 PROCEDURE — 1159F MED LIST DOCD IN RCRD: CPT | Mod: CPTII,S$GLB,, | Performed by: NURSE PRACTITIONER

## 2022-06-03 PROCEDURE — 99999 PR PBB SHADOW E&M-EST. PATIENT-LVL IV: ICD-10-PCS | Mod: PBBFAC,,, | Performed by: NURSE PRACTITIONER

## 2022-06-03 PROCEDURE — 3072F LOW RISK FOR RETINOPATHY: CPT | Mod: CPTII,S$GLB,, | Performed by: NURSE PRACTITIONER

## 2022-06-03 PROCEDURE — 99213 PR OFFICE/OUTPT VISIT, EST, LEVL III, 20-29 MIN: ICD-10-PCS | Mod: S$GLB,,, | Performed by: NURSE PRACTITIONER

## 2022-06-03 PROCEDURE — 3074F SYST BP LT 130 MM HG: CPT | Mod: CPTII,S$GLB,, | Performed by: NURSE PRACTITIONER

## 2022-06-03 PROCEDURE — 99499 RISK ADDL DX/OHS AUDIT: ICD-10-PCS | Mod: S$GLB,,, | Performed by: NURSE PRACTITIONER

## 2022-06-03 PROCEDURE — 3074F PR MOST RECENT SYSTOLIC BLOOD PRESSURE < 130 MM HG: ICD-10-PCS | Mod: CPTII,S$GLB,, | Performed by: NURSE PRACTITIONER

## 2022-06-03 NOTE — ASSESSMENT & PLAN NOTE
Anemia multifactorial including CKD, chronic disease    Laboratory review overall stable. He remains anemic but hemoglobin remains near baseline hemoglobin 12.7. His baseline is 11-12 range. Anemia workup has been essentially unremarkable. Vitamin B12 on the lower end of normal at pervious visit. He was asked to take sublingual Vitamin B12 1,000 mcg daily.     Previous Referral to Nephrology for CKD III. Appointment cancelled due to COVID concerns.  He has not yet had Nephrology follow  up    EGD/Colonoscopy done 6/2020. EGD revealed gastritis with pathology positive for H. Pylori. Colonoscopy reveal polyps with unremarkable pathology, recommended repeat in 5 years    Continue B12 supplementation.  Continue iron supplementation per patient preference. F/u 6 months with repeat labs

## 2022-06-03 NOTE — PROGRESS NOTES
Subjective:       Patient ID: Srinath Mcknight is a 81 y.o. male.    Chief Complaint: F/U anemia    HPI:  81 y.o. male with HTN, OA, chronic anemia, DM, CAD, HLD follows up in the Heme-Onc clinic for h/o chronic anemia of unknown origin. The patient has had workup for chronic anemia of unknown origin. The laboratory studies are unremarkable. The patient has two negative stool occult blood testing on file. The patient continues to take daily iron pills. He tells me he was instructed to take iron pills by his PCP but has no knowledge of iron deficiency history.  Upon review of the medical record the patient has not had documented iron deficiency but iron levels are low normal. Patient does have sickle cell trait. He tells me this was already known to him.     Today's visit: Patient presents today for follow up of his chronic anemia. He continues to take oral iron replacement. Taking vitamin B12 supplement without missed doses. He is without new complaints. Has c/o chronic SOB with exertion. Has underwent Cardiology and Pulmonology evaluation, unremarkable     Social History     Socioeconomic History    Marital status:     Number of children: 0    Highest education level: Master's degree (e.g., MA, MS, Olegario, MEd, MSW, CARMEL)   Occupational History    Occupation: retired     Comment: teacher   Tobacco Use    Smoking status: Never Smoker    Smokeless tobacco: Never Used   Substance and Sexual Activity    Alcohol use: Yes     Alcohol/week: 1.0 standard drink     Types: 1 Glasses of wine per week     Comment: rarely  No alcohol prior to surgery    Drug use: No    Sexual activity: Yes     Partners: Female     Birth control/protection: Condom   Social History Narrative    . No children. Retired but some part time work - 4 hours a day. 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 Caffeine intake - sugar free cola, Rare coffee intake. Still drives. Does have a Living Will . Has a nephew Jt Gayle, lives in  [place]. Has a friend Karina Rossi, locally who could help him.      Social Determinants of Health     Financial Resource Strain: Low Risk     Difficulty of Paying Living Expenses: Not hard at all   Food Insecurity: No Food Insecurity    Worried About Running Out of Food in the Last Year: Never true    Ran Out of Food in the Last Year: Never true   Transportation Needs: No Transportation Needs    Lack of Transportation (Medical): No    Lack of Transportation (Non-Medical): No   Physical Activity: Sufficiently Active    Days of Exercise per Week: 3 days    Minutes of Exercise per Session: 60 min   Stress: No Stress Concern Present    Feeling of Stress : Not at all   Social Connections: Moderately Isolated    Frequency of Communication with Friends and Family: More than three times a week    Frequency of Social Gatherings with Friends and Family: Never    Attends Scientologist Services: Never    Active Member of Clubs or Organizations: No    Attends Club or Organization Meetings: Never    Marital Status:    Housing Stability: Low Risk     Unable to Pay for Housing in the Last Year: No    Number of Places Lived in the Last Year: 1    Unstable Housing in the Last Year: No       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    Trouble in sleeping     Type 2 diabetes mellitus with diabetic polyneuropathy,  without long-term current use of insulin     Urinary incontinence     Uses hearing aid     bilat       Family History   Problem Relation Age of Onset    Hypertension Mother     Heart disease Mother     Diabetes Mother     Hypertension Father     Heart disease Father     Diabetes Father     Stroke Father     Diabetes Sister     Cancer Brother 50        pancreas    Drug abuse Brother     Prostate cancer Neg Hx     Macular degeneration Neg Hx     Retinal detachment Neg Hx     Strabismus Neg Hx     Glaucoma Neg Hx     Blindness Neg Hx     Amblyopia Neg Hx     Kidney disease Neg Hx     Mental illness Neg Hx     Mental retardation Neg Hx     COPD Neg Hx     Asthma Neg Hx        Past Surgical History:   Procedure Laterality Date    BACK SURGERY  2019    COLONOSCOPY N/A 6/30/2020    Procedure: COLONOSCOPY;  Surgeon: Joseph Iraheta MD;  Location: [facility];  Service: Endoscopy;  Laterality: N/A;    ESOPHAGOGASTRODUODENOSCOPY N/A 6/30/2020    Procedure: EGD (ESOPHAGOGASTRODUODENOSCOPY);  Surgeon: Joseph Iraheta MD;  Location: [facility];  Service: Endoscopy;  Laterality: N/A;    HERNIA REPAIR Bilateral 04/18/2017    JOINT REPLACEMENT Left 10/09/2017    L YAHIR Dr. Alcocer    LAMINECTOMY  07/22/2016    left elbow  10/2017    procedure to remove gout    lumbar foraminotomy  03/2018    REVISION TOTAL HIP ARTHROPLASTY Left 9/11/2018    Procedure: REVISION, TOTAL ARTHROPLASTY, HIP;  Surgeon: Albaro Alcocer Sr., MD;  Location: [facility];  Service: Orthopedics;  Laterality: Left;    ROTATOR CUFF REPAIR Left 2006    SHOULDER ARTHROSCOPY W/ ROTATOR CUFF REPAIR Right 2009       Review of Systems   Constitutional: Negative for activity change, appetite change, chills, diaphoresis, fatigue, fever and unexpected weight change.   HENT: Negative for nosebleeds, sore throat, trouble swallowing and voice change.    Eyes: Negative for visual disturbance.   Respiratory: Negative for cough, chest  tightness, shortness of breath (with exertion, chronic) and wheezing.    Cardiovascular: Negative for chest pain, palpitations and leg swelling.   Gastrointestinal: Negative for abdominal distention, abdominal pain, anal bleeding, blood in stool, constipation, diarrhea, nausea and vomiting.   Genitourinary: Negative for difficulty urinating, dysuria and hematuria.   Musculoskeletal: Positive for arthralgias and gait problem (utilizes cane). Negative for back pain and myalgias.        Patient walks with a cane   Skin: Negative for rash and wound.   Neurological: Negative for dizziness, weakness, light-headedness and headaches.   Hematological: Does not bruise/bleed easily.   Psychiatric/Behavioral: The patient is not nervous/anxious.          Medication List with Changes/Refills   Current Medications    ACCU-CHEK SOFTCLIX LANCETS MISC    TEST ONE TIME DAILY    ALBUTEROL (PROVENTIL/VENTOLIN HFA) 90 MCG/ACTUATION INHALER    Inhale 2 puffs into the lungs every 4 (four) hours as needed for Shortness of Breath. Rescue    ALLOPURINOL (ZYLOPRIM) 100 MG TABLET    TAKE 1 TABLET EVERY DAY    ASPIRIN (ECOTRIN) 81 MG EC TABLET    Take 1 tablet (81 mg total) by mouth once daily.    BISACODYL (DULCOLAX) 10 MG SUPP        BLOOD SUGAR DIAGNOSTIC (ACCU-CHEK FRANKO PLUS TEST STRP) STRP    TEST BLOOD SUGAR EVERY DAY    BLOOD-GLUCOSE METER (ACCU-CHEK FRANKO PLUS METER) MISC    1 Device by Misc.(Non-Drug; Combo Route) route once daily.    CETIRIZINE (ZYRTEC) 10 MG TABLET    Take 1 tablet (10 mg total) by mouth once daily. for 14 days    CLONAZEPAM (KLONOPIN) 0.5 MG TABLET    Take 1 tablet (0.5 mg total) by mouth every evening.    CYANOCOBALAMIN, VITAMIN B-12, 1,000 MCG SUBL    Place 1,000 mcg under the tongue once daily.    DICLOFENAC SODIUM (VOLTAREN) 1 % GEL    Apply 2 g topically 4 (four) times daily.    FERROUS SULFATE 324 MG (65 MG IRON) TBEC    Take 1 tablet (324 mg total) by mouth once daily.    FINASTERIDE (PROSCAR) 5 MG TABLET     "Take 1 tablet (5 mg total) by mouth once daily.    FLUZONE HIGHDOSE QUAD 21-22  MCG/0.7 ML SYRG        GABAPENTIN (NEURONTIN) 600 MG TABLET    TAKE 1 TABLET(600 MG) BY MOUTH TWICE DAILY    GARLIC EXTRACT ORAL    Take 1 tablet by mouth once daily. Per [facility]    JARDIANCE 10 MG TABLET    TAKE 1 TABLET(10 MG) BY MOUTH EVERY DAY    LEXISCAN 0.4 MG/5 ML SYRG        LOSARTAN (COZAAR) 50 MG TABLET    Take 2 tablets (100 mg total) by mouth once daily.    METFORMIN (GLUCOPHAGE) 1000 MG TABLET    Take 1 tablet (1,000 mg total) by mouth 2 (two) times daily with meals.    METOPROLOL SUCCINATE (TOPROL-XL) 50 MG 24 HR TABLET    Take 1 tablet (50 mg total) by mouth once daily.    MULTIVITAMIN (THERAGRAN) PER TABLET    Take 1 tablet by mouth once daily.    PAPAVERINE 30 MG/ML INJECTION    Inject 0.3 ml of trimix solution prn ED max once daily  Prepare 10ml of trimix solution containing:    Papaverine 30mg/ml    Phentolamine 1 mg/ml    Alprostadil 10mcg/ml  Dispense 10ml per refill  Qs syringes 1cc/30g/0.5" and alcohol swabs dispense as needed for Intracavernosal injection    PRAVASTATIN (PRAVACHOL) 40 MG TABLET    TAKE 1 TABLET EVERY DAY    SILDENAFIL (VIAGRA) 100 MG TABLET    Take 1 tablet (100 mg total) by mouth daily as needed for Erectile Dysfunction.    TAMSULOSIN (FLOMAX) 0.4 MG CAP    Take 1 capsule (0.4 mg total) by mouth once daily.     Objective:     Vitals:    06/03/22 1407   BP: 121/72   Pulse: 76   Temp: 97.1 °F (36.2 °C)     Lab Results   Component Value Date    WBC 4.57 06/01/2022    HGB 12.7 (L) 06/01/2022    HCT 37.5 (L) 06/01/2022    MCV 87 06/01/2022     06/01/2022     Lab Results   Component Value Date    IRON 110 06/01/2022    TIBC 280 06/01/2022    FERRITIN 60 06/01/2022     BMP  Lab Results   Component Value Date     06/01/2022    K 4.8 06/01/2022     06/01/2022    CO2 21 (L) 06/01/2022    BUN 22 06/01/2022    CREATININE 1.7 (H) 06/01/2022    CALCIUM 8.7 06/01/2022    " ANIONGAP 14 06/01/2022    ESTGFRAFRICA 43 (A) 06/01/2022    EGFRNONAA 38 (A) 06/01/2022     Lab Results   Component Value Date    ALT 18 06/01/2022    AST 20 06/01/2022    ALKPHOS 58 06/01/2022    BILITOT 0.4 06/01/2022               Physical Exam  Vitals reviewed.   Constitutional:       General: He is not in acute distress.     Appearance: He is well-developed.   HENT:      Head: Normocephalic.      Right Ear: External ear normal.      Left Ear: External ear normal.   Eyes:      General: Lids are normal.         Right eye: No discharge.         Left eye: No discharge.      Conjunctiva/sclera: Conjunctivae normal.   Neck:      Thyroid: No thyroid mass.   Cardiovascular:      Rate and Rhythm: Normal rate and regular rhythm.      Heart sounds: Normal heart sounds. No murmur heard.    No friction rub. No gallop.   Pulmonary:      Effort: Pulmonary effort is normal. No respiratory distress.      Breath sounds: Normal breath sounds. No wheezing or rales.   Chest:      Chest wall: No tenderness.   Abdominal:      General: Bowel sounds are normal. There is no distension.      Palpations: Abdomen is soft.   Genitourinary:     Comments: deferred  Musculoskeletal:         General: Normal range of motion.      Cervical back: Normal range of motion.      Comments: Patient walks with a cane.    Lymphadenopathy:      Head:      Right side of head: No submandibular, preauricular or posterior auricular adenopathy.      Left side of head: No submandibular, preauricular or posterior auricular adenopathy.   Skin:     General: Skin is warm and dry.   Neurological:      Mental Status: He is alert and oriented to person, place, and time.   Psychiatric:         Speech: Speech normal.         Behavior: Behavior normal. Behavior is cooperative.         Thought Content: Thought content normal.          Assessment:     Problem List Items Addressed This Visit        Oncology    Anemia due to unknown mechanism    Relevant Orders    CBC Auto  Differential    Comprehensive Metabolic Panel    Iron and TIBC    Ferritin    Protein Electrophoresis, Serum    Immunoglobulin free LT chains blood    Immunofixation Electrophoresis    IMMUNOGLOBULINS (IGG, IGA, IGM) QUANTITATIVE    Chronic anemia - Primary     Anemia multifactorial including CKD, chronic disease    Laboratory review overall stable. He remains anemic but hemoglobin remains near baseline hemoglobin 12.7. His baseline is 11-12 range. Anemia workup has been essentially unremarkable. Vitamin B12 on the lower end of normal at pervious visit. He was asked to take sublingual Vitamin B12 1,000 mcg daily.     Previous Referral to Nephrology for CKD III. Appointment cancelled due to COVID concerns.  He has not yet had Nephrology follow  up    EGD/Colonoscopy done 6/2020. EGD revealed gastritis with pathology positive for H. Pylori. Colonoscopy reveal polyps with unremarkable pathology, recommended repeat in 5 years    Continue B12 supplementation.  Continue iron supplementation per patient preference. F/u 6 months with repeat labs             Relevant Orders    CBC Auto Differential    Comprehensive Metabolic Panel    Iron and TIBC    Ferritin    Protein Electrophoresis, Serum    Immunoglobulin free LT chains blood    Immunofixation Electrophoresis    IMMUNOGLOBULINS (IGG, IGA, IGM) QUANTITATIVE      Other Visit Diagnoses     Elevated serum immunoglobulin free light chain level        Relevant Orders    CBC Auto Differential    Comprehensive Metabolic Panel    Iron and TIBC    Ferritin    Protein Electrophoresis, Serum    Immunoglobulin free LT chains blood    Immunofixation Electrophoresis    IMMUNOGLOBULINS (IGG, IGA, IGM) QUANTITATIVE    Stage 3a chronic kidney disease        Relevant Orders    CBC Auto Differential    Comprehensive Metabolic Panel    Iron and TIBC    Ferritin    Protein Electrophoresis, Serum    Immunoglobulin free LT chains blood    Immunofixation Electrophoresis    IMMUNOGLOBULINS (IGG,  IGA, IGM) QUANTITATIVE            Plan:     Chronic anemia  -     CBC Auto Differential; Future; Expected date: 06/03/2022  -     Comprehensive Metabolic Panel; Future; Expected date: 06/03/2022  -     Iron and TIBC; Future; Expected date: 06/03/2022  -     Ferritin; Future; Expected date: 06/03/2022  -     Protein Electrophoresis, Serum; Future; Expected date: 06/03/2022  -     Immunoglobulin free LT chains blood; Future; Expected date: 06/03/2022  -     Immunofixation Electrophoresis; Future; Expected date: 06/03/2022  -     IMMUNOGLOBULINS (IGG, IGA, IGM) QUANTITATIVE; Future; Expected date: 06/03/2022    Anemia due to unknown mechanism  -     CBC Auto Differential; Future; Expected date: 06/03/2022  -     Comprehensive Metabolic Panel; Future; Expected date: 06/03/2022  -     Iron and TIBC; Future; Expected date: 06/03/2022  -     Ferritin; Future; Expected date: 06/03/2022  -     Protein Electrophoresis, Serum; Future; Expected date: 06/03/2022  -     Immunoglobulin free LT chains blood; Future; Expected date: 06/03/2022  -     Immunofixation Electrophoresis; Future; Expected date: 06/03/2022  -     IMMUNOGLOBULINS (IGG, IGA, IGM) QUANTITATIVE; Future; Expected date: 06/03/2022    Elevated serum immunoglobulin free light chain level  -     CBC Auto Differential; Future; Expected date: 06/03/2022  -     Comprehensive Metabolic Panel; Future; Expected date: 06/03/2022  -     Iron and TIBC; Future; Expected date: 06/03/2022  -     Ferritin; Future; Expected date: 06/03/2022  -     Protein Electrophoresis, Serum; Future; Expected date: 06/03/2022  -     Immunoglobulin free LT chains blood; Future; Expected date: 06/03/2022  -     Immunofixation Electrophoresis; Future; Expected date: 06/03/2022  -     IMMUNOGLOBULINS (IGG, IGA, IGM) QUANTITATIVE; Future; Expected date: 06/03/2022    Stage 3a chronic kidney disease  -     CBC Auto Differential; Future; Expected date: 06/03/2022  -     Comprehensive Metabolic Panel;  Future; Expected date: 06/03/2022  -     Iron and TIBC; Future; Expected date: 06/03/2022  -     Ferritin; Future; Expected date: 06/03/2022  -     Protein Electrophoresis, Serum; Future; Expected date: 06/03/2022  -     Immunoglobulin free LT chains blood; Future; Expected date: 06/03/2022  -     Immunofixation Electrophoresis; Future; Expected date: 06/03/2022  -     IMMUNOGLOBULINS (IGG, IGA, IGM) QUANTITATIVE; Future; Expected date: 06/03/2022              ZEINAB Yuen

## 2022-06-15 ENCOUNTER — OFFICE VISIT (OUTPATIENT)
Dept: PULMONOLOGY | Facility: CLINIC | Age: 82
End: 2022-06-15
Payer: MEDICARE

## 2022-06-15 ENCOUNTER — PATIENT MESSAGE (OUTPATIENT)
Dept: ADMINISTRATIVE | Facility: HOSPITAL | Age: 82
End: 2022-06-15
Payer: MEDICARE

## 2022-06-15 VITALS
BODY MASS INDEX: 26.71 KG/M2 | HEART RATE: 79 BPM | SYSTOLIC BLOOD PRESSURE: 142 MMHG | WEIGHT: 214.81 LBS | DIASTOLIC BLOOD PRESSURE: 80 MMHG | HEIGHT: 75 IN | RESPIRATION RATE: 20 BRPM | OXYGEN SATURATION: 99 %

## 2022-06-15 DIAGNOSIS — G47.33 OSA (OBSTRUCTIVE SLEEP APNEA): Chronic | ICD-10-CM

## 2022-06-15 DIAGNOSIS — G47.52 CONTROLLED REM SLEEP BEHAVIOR DISORDER: ICD-10-CM

## 2022-06-15 DIAGNOSIS — I15.2 HYPERTENSION ASSOCIATED WITH DIABETES: ICD-10-CM

## 2022-06-15 DIAGNOSIS — E11.59 HYPERTENSION ASSOCIATED WITH DIABETES: ICD-10-CM

## 2022-06-15 DIAGNOSIS — R94.2 RESTRICTIVE VENTILATORY DEFECT: ICD-10-CM

## 2022-06-15 DIAGNOSIS — R94.2 DIFFUSION CAPACITY OF LUNG (DL), DECREASED: Primary | ICD-10-CM

## 2022-06-15 DIAGNOSIS — R06.02 SOB (SHORTNESS OF BREATH): ICD-10-CM

## 2022-06-15 DIAGNOSIS — E11.51 TYPE 2 DIABETES MELLITUS WITH DIABETIC PERIPHERAL ANGIOPATHY WITHOUT GANGRENE, WITHOUT LONG-TERM CURRENT USE OF INSULIN: ICD-10-CM

## 2022-06-15 PROCEDURE — 1160F RVW MEDS BY RX/DR IN RCRD: CPT | Mod: CPTII,S$GLB,, | Performed by: INTERNAL MEDICINE

## 2022-06-15 PROCEDURE — 1101F PR PT FALLS ASSESS DOC 0-1 FALLS W/OUT INJ PAST YR: ICD-10-PCS | Mod: CPTII,S$GLB,, | Performed by: INTERNAL MEDICINE

## 2022-06-15 PROCEDURE — 99999 PR PBB SHADOW E&M-EST. PATIENT-LVL V: CPT | Mod: PBBFAC,,, | Performed by: INTERNAL MEDICINE

## 2022-06-15 PROCEDURE — 3079F DIAST BP 80-89 MM HG: CPT | Mod: CPTII,S$GLB,, | Performed by: INTERNAL MEDICINE

## 2022-06-15 PROCEDURE — 3288F PR FALLS RISK ASSESSMENT DOCUMENTED: ICD-10-PCS | Mod: CPTII,S$GLB,, | Performed by: INTERNAL MEDICINE

## 2022-06-15 PROCEDURE — 1101F PT FALLS ASSESS-DOCD LE1/YR: CPT | Mod: CPTII,S$GLB,, | Performed by: INTERNAL MEDICINE

## 2022-06-15 PROCEDURE — 3072F LOW RISK FOR RETINOPATHY: CPT | Mod: CPTII,S$GLB,, | Performed by: INTERNAL MEDICINE

## 2022-06-15 PROCEDURE — 99214 OFFICE O/P EST MOD 30 MIN: CPT | Mod: S$GLB,,, | Performed by: INTERNAL MEDICINE

## 2022-06-15 PROCEDURE — 3079F PR MOST RECENT DIASTOLIC BLOOD PRESSURE 80-89 MM HG: ICD-10-PCS | Mod: CPTII,S$GLB,, | Performed by: INTERNAL MEDICINE

## 2022-06-15 PROCEDURE — 1160F PR REVIEW ALL MEDS BY PRESCRIBER/CLIN PHARMACIST DOCUMENTED: ICD-10-PCS | Mod: CPTII,S$GLB,, | Performed by: INTERNAL MEDICINE

## 2022-06-15 PROCEDURE — 1159F PR MEDICATION LIST DOCUMENTED IN MEDICAL RECORD: ICD-10-PCS | Mod: CPTII,S$GLB,, | Performed by: INTERNAL MEDICINE

## 2022-06-15 PROCEDURE — 3288F FALL RISK ASSESSMENT DOCD: CPT | Mod: CPTII,S$GLB,, | Performed by: INTERNAL MEDICINE

## 2022-06-15 PROCEDURE — 3077F SYST BP >= 140 MM HG: CPT | Mod: CPTII,S$GLB,, | Performed by: INTERNAL MEDICINE

## 2022-06-15 PROCEDURE — 99214 PR OFFICE/OUTPT VISIT, EST, LEVL IV, 30-39 MIN: ICD-10-PCS | Mod: S$GLB,,, | Performed by: INTERNAL MEDICINE

## 2022-06-15 PROCEDURE — 1159F MED LIST DOCD IN RCRD: CPT | Mod: CPTII,S$GLB,, | Performed by: INTERNAL MEDICINE

## 2022-06-15 PROCEDURE — 99999 PR PBB SHADOW E&M-EST. PATIENT-LVL V: ICD-10-PCS | Mod: PBBFAC,,, | Performed by: INTERNAL MEDICINE

## 2022-06-15 PROCEDURE — 3077F PR MOST RECENT SYSTOLIC BLOOD PRESSURE >= 140 MM HG: ICD-10-PCS | Mod: CPTII,S$GLB,, | Performed by: INTERNAL MEDICINE

## 2022-06-15 PROCEDURE — 3072F PR LOW RISK FOR RETINOPATHY: ICD-10-PCS | Mod: CPTII,S$GLB,, | Performed by: INTERNAL MEDICINE

## 2022-06-15 RX ORDER — ALBUTEROL SULFATE 90 UG/1
2 AEROSOL, METERED RESPIRATORY (INHALATION) EVERY 6 HOURS PRN
Qty: 18 G | Refills: 3 | Status: SHIPPED | OUTPATIENT
Start: 2022-06-15 | End: 2023-10-12

## 2022-06-15 NOTE — PROGRESS NOTES
Subjective:       Srinath Mcknight is a 81 y.o. male   Last visit was 07/26/2018  Has been doing overall well.  Morris score 5. Recent revision hip Left  His major sleep issues a REM behavior disorder, PLMD and obstructive sleep apnea  With regard to obstructive sleep apnea and this is borderline patient has been reluctant to use CPAP in past.  REM behavioral events have been very infrequent less than once or twice in the month  He is stable on a regimen of clonidine 0.5 mg.  And melatonin   He is not taking Mirapex for his periodic limb movement  No cough no wheezing or shortness of breath  I have reviewed the patient's medical history in detail and updated the computerized patient record.    01/05/2022  Follow up visit to review tersts  C/o easily fatigue, DAMON   See cardiology , -ve w/u  Seen Dr Zimmer  ABG: Compensated resp alkalosis  6MWD:Reduced exercise capacity, 32.42%, Dyspnea wa grade 3, SpO2 100%, Peak BP and HR was 103 BPM, /64  PFT: Normal Spirometry:Mild restriction: DLCO mild reduced  Bed time:1130pm  Wake time 9 am  Appear to have stopped Klonopin and Melatonin while in hospital, says wife observed sleep reenactment activity    06/15/2022:  Last visit was 01/05/2022  Interested in Pul rehab: did not qualify last testing  No cough, No wheezing  SOB: frustrating after hip replacement and back surgery  PFT: low MVV and restriction  Not on Oxygen  Has GAYLA inhaler, not sure  regardinfg sleep issues  Stable on Klonopin and Melatonin  No reenactment    Previous Report(s) Reviewed: historical medical records and office notes     The following portions of the patient's history were reviewed and updated as appropriate:   He  has a past medical history of Anemia due to unknown mechanism (11/10/2015), Angina pectoris (2014), Arthritis, Back pain, Coronary artery disease, Diabetes mellitus with stage 3 chronic kidney disease (11/18/2020), DM (diabetes mellitus) (25-30 years), DM (diabetes mellitus),  Encounter for blood transfusion, Gout (12/05/2017), History of blood transfusion, Hyperlipemia, Hypertension, CHARLES (obstructive sleep apnea), Polyneuropathy, Spinal cord disease, 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  He does not have any pertinent problems on file.  He  has a past surgical history that includes Rotator cuff repair (Left, 2006); Shoulder arthroscopy w/ rotator cuff repair (Right, 2009); Laminectomy (07/22/2016); Hernia repair (Bilateral, 04/18/2017); Joint replacement (Left, 10/09/2017); Back surgery (2019); lumbar foraminotomy (03/2018); left elbow (10/2017); Revision total hip arthroplasty (Left, 9/11/2018); Esophagogastroduodenoscopy (N/A, 6/30/2020); and Colonoscopy (N/A, 6/30/2020).  His family history includes Cancer (age of onset: 50) in his brother; Diabetes in his father, mother, and sister; Drug abuse in his brother; Heart disease in his father and mother; Hypertension in his father and mother; Stroke in his father.  He  reports that he has never smoked. He has never used smokeless tobacco. He reports current alcohol use of about 1.0 standard drink of alcohol per week. He reports that he does not use drugs.  He has a current medication list which includes the following prescription(s): accu-chek softclix lancets, albuterol, allopurinol, bisacodyl, accu-chek irving plus test strp, blood-glucose meter, clonazepam, cyanocobalamin (vitamin b-12), ferrous sulfate, fluzone highdose quad 21-22 pf, gabapentin, garlic extract, jardiance, lexiscan, losartan, metformin, metoprolol succinate, multivitamin, papaverine, pravastatin, sildenafil, tamsulosin, aspirin, cetirizine, diclofenac sodium, and finasteride.  Current Outpatient Medications on File Prior to Visit   Medication Sig Dispense Refill    ACCU-CHEK SOFTCLIX LANCETS  "Misc TEST ONE TIME DAILY 100 each 11    albuterol (PROVENTIL/VENTOLIN HFA) 90 mcg/actuation inhaler Inhale 2 puffs into the lungs every 4 (four) hours as needed for Shortness of Breath. Rescue 18 g 11    allopurinoL (ZYLOPRIM) 100 MG tablet TAKE 1 TABLET EVERY DAY 90 tablet 3    bisacodyl (DULCOLAX) 10 mg Supp   0    blood sugar diagnostic (ACCU-CHEK FRANKO PLUS TEST STRP) Strp TEST BLOOD SUGAR EVERY  strip 11    blood-glucose meter (ACCU-CHEK FRANKO PLUS METER) Misc 1 Device by Misc.(Non-Drug; Combo Route) route once daily. 1 each 0    clonazePAM (KLONOPIN) 0.5 MG tablet Take 1 tablet (0.5 mg total) by mouth every evening. 30 tablet 4    cyanocobalamin, vitamin B-12, 1,000 mcg Subl Place 1,000 mcg under the tongue once daily. 90 tablet 3    ferrous sulfate 324 mg (65 mg iron) TbEC Take 1 tablet (324 mg total) by mouth once daily.      FLUZONE HIGHDOSE QUAD 21-22  mcg/0.7 mL Syrg       gabapentin (NEURONTIN) 600 MG tablet TAKE 1 TABLET(600 MG) BY MOUTH TWICE DAILY 180 tablet 2    GARLIC EXTRACT ORAL Take 1 tablet by mouth once daily. Per [facility]      JARDIANCE 10 mg tablet TAKE 1 TABLET(10 MG) BY MOUTH EVERY DAY 30 tablet 3    LEXISCAN 0.4 mg/5 mL Syrg       losartan (COZAAR) 50 MG tablet Take 2 tablets (100 mg total) by mouth once daily. 180 tablet 3    metFORMIN (GLUCOPHAGE) 1000 MG tablet Take 1 tablet (1,000 mg total) by mouth 2 (two) times daily with meals. 180 tablet 3    metoprolol succinate (TOPROL-XL) 50 MG 24 hr tablet Take 1 tablet (50 mg total) by mouth once daily. 90 tablet 3    multivitamin (THERAGRAN) per tablet Take 1 tablet by mouth once daily.      papaverine 30 mg/mL injection Inject 0.3 ml of trimix solution prn ED max once daily  Prepare 10ml of trimix solution containing:    Papaverine 30mg/ml    Phentolamine 1 mg/ml    Alprostadil 10mcg/ml  Dispense 10ml per refill  Qs syringes 1cc/30g/0.5" and alcohol swabs dispense as needed for Intracavernosal " "injection 10 mL 5    pravastatin (PRAVACHOL) 40 MG tablet TAKE 1 TABLET EVERY DAY 90 tablet 3    sildenafiL (VIAGRA) 100 MG tablet Take 1 tablet (100 mg total) by mouth daily as needed for Erectile Dysfunction. 30 tablet 2    tamsulosin (FLOMAX) 0.4 mg Cap Take 1 capsule (0.4 mg total) by mouth once daily. 90 capsule 3    aspirin (ECOTRIN) 81 MG EC tablet Take 1 tablet (81 mg total) by mouth once daily.  0    cetirizine (ZYRTEC) 10 MG tablet Take 1 tablet (10 mg total) by mouth once daily. for 14 days 14 tablet 0    diclofenac sodium (VOLTAREN) 1 % Gel Apply 2 g topically 4 (four) times daily. 100 g 5    finasteride (PROSCAR) 5 mg tablet Take 1 tablet (5 mg total) by mouth once daily. (Patient not taking: Reported on 4/14/2022) 30 tablet 6     No current facility-administered medications on file prior to visit.     He is allergic to sulfa (sulfonamide antibiotics)..    Review of Systems  A comprehensive review of systems was negative.    Except for left grion and knee pain, SP intrathecal lumber shot, Hip surgery, walks with  1 canes     Objective:      Vitals:    06/15/22 1538   BP: (!) 142/80   Pulse: 79   Resp: 20   SpO2: 99%   Weight: 97.4 kg (214 lb 13.4 oz)   Height: 6' 3" (1.905 m)     Using cane    Physical Exam  Vitals and nursing note reviewed.   Constitutional:       Appearance: He is well-developed.   HENT:      Head: Normocephalic and atraumatic.      Nose: Nose normal.   Eyes:      General:         Left eye: No discharge.      Pupils: Pupils are equal, round, and reactive to light.   Neck:      Vascular: No JVD.   Cardiovascular:      Rate and Rhythm: Normal rate and regular rhythm.      Heart sounds: Normal heart sounds. No murmur heard.  Pulmonary:      Effort: Pulmonary effort is normal. No respiratory distress.   Abdominal:      General: Bowel sounds are normal.      Palpations: Abdomen is soft.   Musculoskeletal:         General: No deformity. Normal range of motion.      Cervical back: " Normal range of motion and neck supple.   Skin:     General: Skin is warm and dry.   Neurological:      Mental Status: He is alert and oriented to person, place, and time.      Deep Tendon Reflexes: Reflexes are normal and symmetric.          Assessment:      Problem List Items Addressed This Visit     CHARLES (obstructive sleep apnea) (Chronic)    Controlled REM sleep behavior disorder    Hypertension associated with diabetes     Stable on COZAAR           Type 2 diabetes mellitus with diabetic peripheral angiopathy without gangrene, without long-term current use of insulin     Stable JARDIANCE,           Diffusion capacity of lung (dl), decreased - Primary     DLCO 64.4%           Relevant Orders    X-Ray Chest PA And Lateral    FL Fluoro of Diaphragm    Stress test, pulmonary    Complete PFT without bronchodilator - Clinic    MAXIMAL INSPIRATORY/EXPIRATORY PRESSURE    Restrictive ventilatory defect     TLC 77.9%           Relevant Orders    X-Ray Chest PA And Lateral    FL Fluoro of Diaphragm    Stress test, pulmonary    Complete PFT without bronchodilator - Clinic    MAXIMAL INSPIRATORY/EXPIRATORY PRESSURE         Plan:      Overall stable.   Continue current regime: Melatonin and Klonopin     Try pul rehab  Likely deconditioning      Requested Prescriptions      No prescriptions requested or ordered in this encounter         Requested Prescriptions      No prescriptions requested or ordered in this encounter         Follow up in about 4 weeks (around 7/13/2022), or SNIFF, MEP, MIP, PFT, 6MWD, CXR.    This note was prepared using voice recognition system and is likely to have sound alike errors that may have been overlooked even after proof reading.  Please call me with any questions    Discussed diagnosis, its evaluation, treatment and usual course. All questions answered.    Thank you for the courtesy of participating in the care of this patient    Martin Machuca MD

## 2022-06-16 DIAGNOSIS — E78.2 COMBINED HYPERLIPIDEMIA ASSOCIATED WITH TYPE 2 DIABETES MELLITUS: ICD-10-CM

## 2022-06-16 DIAGNOSIS — E11.69 COMBINED HYPERLIPIDEMIA ASSOCIATED WITH TYPE 2 DIABETES MELLITUS: ICD-10-CM

## 2022-06-16 NOTE — TELEPHONE ENCOUNTER
Care Due:                  Date            Visit Type   Department     Provider  --------------------------------------------------------------------------------                                EP -                              PRIMARY      [facility] INTERNAL  Last Visit: 09-      [facility] ([facility])   MEDICINE       Yeison Wilder  Next Visit: None Scheduled  None         None Found                                                            Last  Test          Frequency    Reason                     Performed    Due Date  --------------------------------------------------------------------------------    Office Visit  12 months..  JARDIANCE, allopurinoL,    09-   09-                             losartan, metFORMIN,                             metoprolol, pravastatin,                             sildenafiL, tamsulosin...    HBA1C.......  6 months...  JARDIANCE, metFORMIN.....  09- 03-    Lipid Panel.  12 months..  pravastatin..............  09- 09-    Uric Acid...  12 months..  allopurinoL..............  Not Found    Overdue    [employer] Embedded Care Gaps. Reference number: [id number]. 6/16/2022   3:08:34 PM CDT

## 2022-06-16 NOTE — TELEPHONE ENCOUNTER
Refill Routing Note   Medication(s) are not appropriate for processing by [facility] for the following reason(s):      - Required laboratory values are outdated  - Required laboratory values are abnormal    [facility] action(s):  Defer Medication-related problems identified:   Requires labs  Requires appointment     Medication Therapy Plan: CD.LABS(LIPIDS, URIC ACID, A1-c) LOV 09/15/21  Medication reconciliation completed: No     Appointments  past 12m or future 3m with PCP    Date Provider   Last Visit   9/15/2021 Yeison Wilder MD   Next Visit   Visit date not found Yeison Wilder MD   ED visits in past 90 days: 0        Note composed:6:59 PM 06/16/2022

## 2022-06-17 ENCOUNTER — TELEPHONE (OUTPATIENT)
Dept: INTERNAL MEDICINE | Facility: CLINIC | Age: 82
End: 2022-06-17
Payer: MEDICARE

## 2022-06-17 RX ORDER — EMPAGLIFLOZIN 10 MG/1
TABLET, FILM COATED ORAL
Qty: 90 TABLET | Refills: 0 | Status: SHIPPED | OUTPATIENT
Start: 2022-06-17 | End: 2022-09-17

## 2022-06-17 NOTE — TELEPHONE ENCOUNTER
Patient overdue for follow up.  Refill is given but the patient needs an appointment with Dr. Wilder or Jose Roger NP, for follow up.

## 2022-06-17 NOTE — TELEPHONE ENCOUNTER
----- Message from Deangelo Haddad sent at 6/17/2022  8:30 AM CDT -----  Contact: [phone]  Pt is calling in, he said he received a call, he would like a call back, thanks

## 2022-06-21 RX ORDER — PRAVASTATIN SODIUM 40 MG/1
40 TABLET ORAL DAILY
Qty: 90 TABLET | Refills: 0 | Status: SHIPPED | OUTPATIENT
Start: 2022-06-21 | End: 2023-02-09

## 2022-06-22 NOTE — TELEPHONE ENCOUNTER
Refill Decision Note   [place] Juan Luis  is requesting a refill authorization.  Brief Assessment and Rationale for Refill:  Approve     Medication Therapy Plan:       Medication Reconciliation Completed: No   Comments:     No Care Gaps recommended.     Note composed:10:36 PM 06/21/2022

## 2022-06-22 NOTE — TELEPHONE ENCOUNTER
No new care gaps identified.  [employer] Embedded Care Gaps. Reference number: [id number]. 6/21/2022   10:25:40 PM CDT

## 2022-06-23 ENCOUNTER — OFFICE VISIT (OUTPATIENT)
Dept: INTERNAL MEDICINE | Facility: CLINIC | Age: 82
End: 2022-06-23
Payer: MEDICARE

## 2022-06-23 ENCOUNTER — LAB VISIT (OUTPATIENT)
Dept: LAB | Facility: HOSPITAL | Age: 82
End: 2022-06-23
Attending: NURSE PRACTITIONER
Payer: MEDICARE

## 2022-06-23 VITALS
DIASTOLIC BLOOD PRESSURE: 60 MMHG | SYSTOLIC BLOOD PRESSURE: 110 MMHG | HEIGHT: 75 IN | BODY MASS INDEX: 26.26 KG/M2 | OXYGEN SATURATION: 98 % | HEART RATE: 71 BPM | TEMPERATURE: 98 F | WEIGHT: 211.19 LBS

## 2022-06-23 DIAGNOSIS — E11.22 DIABETES MELLITUS WITH STAGE 3 CHRONIC KIDNEY DISEASE: ICD-10-CM

## 2022-06-23 DIAGNOSIS — N18.30 DIABETES MELLITUS WITH STAGE 3 CHRONIC KIDNEY DISEASE: ICD-10-CM

## 2022-06-23 DIAGNOSIS — E11.59 HYPERTENSION ASSOCIATED WITH DIABETES: ICD-10-CM

## 2022-06-23 DIAGNOSIS — E11.42 TYPE 2 DIABETES MELLITUS WITH DIABETIC POLYNEUROPATHY, WITHOUT LONG-TERM CURRENT USE OF INSULIN: Chronic | ICD-10-CM

## 2022-06-23 DIAGNOSIS — E11.42 TYPE 2 DIABETES MELLITUS WITH DIABETIC POLYNEUROPATHY, WITHOUT LONG-TERM CURRENT USE OF INSULIN: Primary | Chronic | ICD-10-CM

## 2022-06-23 DIAGNOSIS — I15.2 HYPERTENSION ASSOCIATED WITH DIABETES: ICD-10-CM

## 2022-06-23 LAB
ESTIMATED AVG GLUCOSE: 126 MG/DL (ref 68–131)
HBA1C MFR BLD: 6 % (ref 4–5.6)

## 2022-06-23 PROCEDURE — 99214 OFFICE O/P EST MOD 30 MIN: CPT | Mod: S$GLB,,, | Performed by: NURSE PRACTITIONER

## 2022-06-23 PROCEDURE — 3074F SYST BP LT 130 MM HG: CPT | Mod: CPTII,S$GLB,, | Performed by: NURSE PRACTITIONER

## 2022-06-23 PROCEDURE — 1159F MED LIST DOCD IN RCRD: CPT | Mod: CPTII,S$GLB,, | Performed by: NURSE PRACTITIONER

## 2022-06-23 PROCEDURE — 3288F FALL RISK ASSESSMENT DOCD: CPT | Mod: CPTII,S$GLB,, | Performed by: NURSE PRACTITIONER

## 2022-06-23 PROCEDURE — 1101F PT FALLS ASSESS-DOCD LE1/YR: CPT | Mod: CPTII,S$GLB,, | Performed by: NURSE PRACTITIONER

## 2022-06-23 PROCEDURE — 83036 HEMOGLOBIN GLYCOSYLATED A1C: CPT | Performed by: NURSE PRACTITIONER

## 2022-06-23 PROCEDURE — 1126F PR PAIN SEVERITY QUANTIFIED, NO PAIN PRESENT: ICD-10-PCS | Mod: CPTII,S$GLB,, | Performed by: NURSE PRACTITIONER

## 2022-06-23 PROCEDURE — 99999 PR PBB SHADOW E&M-EST. PATIENT-LVL V: ICD-10-PCS | Mod: PBBFAC,,, | Performed by: NURSE PRACTITIONER

## 2022-06-23 PROCEDURE — 36415 COLL VENOUS BLD VENIPUNCTURE: CPT | Mod: PO | Performed by: NURSE PRACTITIONER

## 2022-06-23 PROCEDURE — 1160F RVW MEDS BY RX/DR IN RCRD: CPT | Mod: CPTII,S$GLB,, | Performed by: NURSE PRACTITIONER

## 2022-06-23 PROCEDURE — 3074F PR MOST RECENT SYSTOLIC BLOOD PRESSURE < 130 MM HG: ICD-10-PCS | Mod: CPTII,S$GLB,, | Performed by: NURSE PRACTITIONER

## 2022-06-23 PROCEDURE — 3078F PR MOST RECENT DIASTOLIC BLOOD PRESSURE < 80 MM HG: ICD-10-PCS | Mod: CPTII,S$GLB,, | Performed by: NURSE PRACTITIONER

## 2022-06-23 PROCEDURE — 99999 PR PBB SHADOW E&M-EST. PATIENT-LVL V: CPT | Mod: PBBFAC,,, | Performed by: NURSE PRACTITIONER

## 2022-06-23 PROCEDURE — 1101F PR PT FALLS ASSESS DOC 0-1 FALLS W/OUT INJ PAST YR: ICD-10-PCS | Mod: CPTII,S$GLB,, | Performed by: NURSE PRACTITIONER

## 2022-06-23 PROCEDURE — 1159F PR MEDICATION LIST DOCUMENTED IN MEDICAL RECORD: ICD-10-PCS | Mod: CPTII,S$GLB,, | Performed by: NURSE PRACTITIONER

## 2022-06-23 PROCEDURE — 1160F PR REVIEW ALL MEDS BY PRESCRIBER/CLIN PHARMACIST DOCUMENTED: ICD-10-PCS | Mod: CPTII,S$GLB,, | Performed by: NURSE PRACTITIONER

## 2022-06-23 PROCEDURE — 3072F PR LOW RISK FOR RETINOPATHY: ICD-10-PCS | Mod: CPTII,S$GLB,, | Performed by: NURSE PRACTITIONER

## 2022-06-23 PROCEDURE — 3288F PR FALLS RISK ASSESSMENT DOCUMENTED: ICD-10-PCS | Mod: CPTII,S$GLB,, | Performed by: NURSE PRACTITIONER

## 2022-06-23 PROCEDURE — 99214 PR OFFICE/OUTPT VISIT, EST, LEVL IV, 30-39 MIN: ICD-10-PCS | Mod: S$GLB,,, | Performed by: NURSE PRACTITIONER

## 2022-06-23 PROCEDURE — 1126F AMNT PAIN NOTED NONE PRSNT: CPT | Mod: CPTII,S$GLB,, | Performed by: NURSE PRACTITIONER

## 2022-06-23 PROCEDURE — 3078F DIAST BP <80 MM HG: CPT | Mod: CPTII,S$GLB,, | Performed by: NURSE PRACTITIONER

## 2022-06-23 PROCEDURE — 3072F LOW RISK FOR RETINOPATHY: CPT | Mod: CPTII,S$GLB,, | Performed by: NURSE PRACTITIONER

## 2022-06-23 RX ORDER — MOXIFLOXACIN 5 MG/ML
SOLUTION/ DROPS OPHTHALMIC
COMMUNITY
Start: 2022-06-15 | End: 2023-12-18

## 2022-06-23 NOTE — PATIENT INSTRUCTIONS
Continue to follow with cardio and pulm    Low salt diet    Exercise    Follow up with Dr. MINER in 6 months

## 2022-06-23 NOTE — PROGRESS NOTES
"Subjective:       Patient ID: Srinath Mcknight is a 81 y.o. male.    Chief Complaint: Follow-up    Pt presents to clinic today for a follow up of his diabetes. Pt due for A1C today. Reports compliance with metformin and Jardiance. Does report Jardiance is costing more right now possibly due to him being in the [place]. He will check with insurance on this. No problems with his sugar right now. Down 8 lbs since last appt.    BP well controlled on losartan, metoprolol. Reports he eats low salt diet.    Does complain of some shortness of breath that has been on going. Has seen cardio with neg work up and pulm. Has follow up next month with pulm for re-evaulation. Pt does admit he no longer exercises and wonders if it is just from getting old and being less active. He does have a history of CAD. He has no history of smoking or lung disease.      /60   Pulse 71   Temp 98 °F (36.7 °C) (Temporal)   Ht 6' 3" (1.905 m)   Wt 95.8 kg (211 lb 3.2 oz)   SpO2 98%   BMI 26.40 kg/m²     Review of Systems   Constitutional: Positive for fatigue. Negative for appetite change, chills, diaphoresis, fever and unexpected weight change.   HENT: Negative for congestion, ear pain, nosebleeds, postnasal drip, rhinorrhea, sinus pressure, sneezing, sore throat and trouble swallowing.    Eyes: Negative for photophobia, pain and visual disturbance.   Respiratory: Positive for shortness of breath. Negative for apnea, cough, choking, chest tightness and wheezing.    Cardiovascular: Negative for chest pain, palpitations and leg swelling.   Gastrointestinal: Negative for abdominal pain, blood in stool, constipation, diarrhea, nausea and vomiting.   Genitourinary: Negative for decreased urine volume, difficulty urinating, dysuria, hematuria and urgency.   Musculoskeletal: Positive for arthralgias, back pain and gait problem. Negative for joint swelling and myalgias.   Skin: Negative for rash.   Neurological: Negative for dizziness, tremors, " seizures, syncope, weakness, light-headedness, numbness and headaches.   Psychiatric/Behavioral: Negative for agitation, confusion, decreased concentration, hallucinations and sleep disturbance. The patient is not nervous/anxious.        Objective:      Physical Exam  Vitals and nursing note reviewed.   Constitutional:       General: He is not in acute distress.     Appearance: He is well-developed. He is not diaphoretic.   HENT:      Head: Normocephalic and atraumatic.   Eyes:      General:         Right eye: No discharge.         Left eye: No discharge.      Conjunctiva/sclera: Conjunctivae normal.   Cardiovascular:      Rate and Rhythm: Normal rate and regular rhythm.      Heart sounds: Normal heart sounds. No murmur heard.  Pulmonary:      Effort: Pulmonary effort is normal. No respiratory distress.      Breath sounds: Normal breath sounds. No wheezing or rales.   Chest:      Chest wall: No tenderness.   Abdominal:      General: There is no distension.      Palpations: Abdomen is soft.   Musculoskeletal:         General: Normal range of motion.   Skin:     General: Skin is warm and dry.      Findings: No rash.   Neurological:      Mental Status: He is alert and oriented to person, place, and time.      Motor: Weakness (walks with cane) present.      Gait: Gait abnormal.   Psychiatric:         Behavior: Behavior normal. Behavior is cooperative.         Thought Content: Thought content normal.         Judgment: Judgment normal.         Assessment:       1. Type 2 diabetes mellitus with diabetic polyneuropathy, without long-term current use of insulin    2. Hypertension associated with diabetes    3. Diabetes mellitus with stage 3 chronic kidney disease    4. BMI 26.0-26.9,adult        Plan:       [place] was seen today for follow-up.    Diagnoses and all orders for this visit:    Type 2 diabetes mellitus with diabetic polyneuropathy, without long-term current use of insulin  Comments:  stable- will update A1C  today. Continue metformin and jardiance  Orders:  -     Hemoglobin A1C; Future    Hypertension associated with diabetes  Comments:  stable- continue meds and following with cardio    Diabetes mellitus with stage 3 chronic kidney disease  Comments:  stable    BMI 26.0-26.9,adult      Patient Instructions   Continue to follow with cardio and pulm    Low salt diet    Exercise    Follow up with Dr. MINER in 6 months

## 2022-07-05 ENCOUNTER — HOSPITAL ENCOUNTER (OUTPATIENT)
Dept: RADIOLOGY | Facility: HOSPITAL | Age: 82
Discharge: HOME | End: 2022-07-05
Attending: INTERNAL MEDICINE
Payer: MEDICARE

## 2022-07-05 DIAGNOSIS — R94.2 DIFFUSION CAPACITY OF LUNG (DL), DECREASED: ICD-10-CM

## 2022-07-05 DIAGNOSIS — R94.2 RESTRICTIVE VENTILATORY DEFECT: ICD-10-CM

## 2022-07-05 PROCEDURE — 76000 FLUOROSCOPY <1 HR PHYS/QHP: CPT | Mod: TC

## 2022-07-13 ENCOUNTER — HOSPITAL ENCOUNTER (OUTPATIENT)
Dept: RADIOLOGY | Facility: HOSPITAL | Age: 82
Discharge: HOME | End: 2022-07-13
Attending: INTERNAL MEDICINE
Payer: MEDICARE

## 2022-07-13 ENCOUNTER — CLINICAL SUPPORT (OUTPATIENT)
Dept: PULMONOLOGY | Facility: CLINIC | Age: 82
End: 2022-07-13
Payer: MEDICARE

## 2022-07-13 VITALS — BODY MASS INDEX: 26.15 KG/M2 | WEIGHT: 210.31 LBS | HEIGHT: 75 IN

## 2022-07-13 DIAGNOSIS — R94.2 RESTRICTIVE VENTILATORY DEFECT: ICD-10-CM

## 2022-07-13 DIAGNOSIS — R94.2 DIFFUSION CAPACITY OF LUNG (DL), DECREASED: ICD-10-CM

## 2022-07-13 LAB
BRPFT: NORMAL
DLCO ADJ PRE: 19.11 ML/(MIN*MMHG)
DLCO SINGLE BREATH LLN: 22.86
DLCO SINGLE BREATH PRE REF: 60.4 %
DLCO SINGLE BREATH REF: 29.79
DLCOC SBVA LLN: 2.65
DLCOC SBVA PRE REF: 100.4 %
DLCOC SBVA REF: 3.64
DLCOC SINGLE BREATH LLN: 22.86
DLCOC SINGLE BREATH PRE REF: 64.2 %
DLCOC SINGLE BREATH REF: 29.79
DLCOVA LLN: 2.65
DLCOVA PRE REF: 94.5 %
DLCOVA PRE: 3.44 ML/(MIN*MMHG*L)
DLCOVA REF: 3.64
DLVAADJ PRE: 3.65 ML/(MIN*MMHG*L)
ERV LLN: -16448.92
ERV PRE REF: 126.8 %
ERV REF: 1.08
FEF 25 75 LLN: 0.61
FEF 25 75 PRE REF: 193.4 %
FEF 25 75 REF: 2.01
FEV1 FVC LLN: 60
FEV1 FVC PRE REF: 106.5 %
FEV1 FVC REF: 74
FEV1 LLN: 1.9
FEV1 PRE REF: 123.2 %
FEV1 REF: 2.89
FRCPLETH LLN: 3.1
FRCPLETH PREREF: 87.2 %
FRCPLETH REF: 4.09
FVC LLN: 2.74
FVC PRE REF: 114.5 %
FVC REF: 3.92
IVC PRE: 3.53 L
IVC SINGLE BREATH LLN: 2.74
IVC SINGLE BREATH PRE REF: 90.1 %
IVC SINGLE BREATH REF: 3.92
MEP LLN: 83
MEP PRE REF: 77 %
MEP PRE: 77 CMH2O
MEP REF: 100
MIP LLN: 58
MIP PRE REF: 45.3 %
MIP PRE: 34 CMH2O
MIP REF: 75
MVV LLN: 117
MVV PRE REF: 74.3 %
MVV REF: 137
PEF LLN: 5.91
PEF PRE REF: 129.3 %
PEF REF: 8.97
PRE DLCO: 18.01 ML/(MIN*MMHG)
PRE ERV: 1.37 L
PRE FEF 25 75: 3.88 L/S
PRE FET 100: 12.91 SEC
PRE FEV1 FVC: 79.27 %
PRE FEV1: 3.56 L
PRE FRC PL: 3.56 L
PRE FVC: 4.49 L
PRE MVV: 102 L/MIN
PRE PEF: 11.6 L/S
PRE RV: 1.75 L
PRE TLC: 6.28 L
RAW LLN: 3.06
RAW PRE REF: 47 %
RAW PRE: 1.44 CMH2O*S/L
RAW REF: 3.06
RV LLN: 2.34
RV PRE REF: 58.1 %
RV REF: 3.01
RVTLC LLN: 36
RVTLC PRE REF: 62.3 %
RVTLC PRE: 27.89 %
RVTLC REF: 45
TLC LLN: 7.03
TLC PRE REF: 76.7 %
TLC REF: 8.18
VA PRE: 5.23 L
VA SINGLE BREATH LLN: 8.03
VA SINGLE BREATH PRE REF: 65.2 %
VA SINGLE BREATH REF: 8.03
VC LLN: 2.74
VC PRE REF: 115.4 %
VC PRE: 4.53 L
VC REF: 3.92
VTGRAWPRE: 3.2 L

## 2022-07-13 PROCEDURE — 94618 PULMONARY STRESS TESTING: ICD-10-PCS | Mod: S$GLB,,, | Performed by: INTERNAL MEDICINE

## 2022-07-13 PROCEDURE — 94729 DIFFUSING CAPACITY: CPT | Mod: S$GLB,,, | Performed by: INTERNAL MEDICINE

## 2022-07-13 PROCEDURE — 94726 PULM FUNCT TST PLETHYSMOGRAP: ICD-10-PCS | Mod: S$GLB,,, | Performed by: INTERNAL MEDICINE

## 2022-07-13 PROCEDURE — 94618 PULMONARY STRESS TESTING: CPT | Mod: S$GLB,,, | Performed by: INTERNAL MEDICINE

## 2022-07-13 PROCEDURE — 94010 BREATHING CAPACITY TEST: ICD-10-PCS | Mod: S$GLB,,, | Performed by: INTERNAL MEDICINE

## 2022-07-13 PROCEDURE — 94729 PR C02/MEMBANE DIFFUSE CAPACITY: ICD-10-PCS | Mod: S$GLB,,, | Performed by: INTERNAL MEDICINE

## 2022-07-13 PROCEDURE — 71046 X-RAY EXAM CHEST 2 VIEWS: CPT | Mod: 26,,, | Performed by: RADIOLOGY

## 2022-07-13 PROCEDURE — 94799 PR NIF/PIF PULMONARY FUNCTION TEST: ICD-10-PCS | Mod: S$GLB,,, | Performed by: INTERNAL MEDICINE

## 2022-07-13 PROCEDURE — 71046 XR CHEST PA AND LATERAL: ICD-10-PCS | Mod: 26,,, | Performed by: RADIOLOGY

## 2022-07-13 PROCEDURE — 71046 X-RAY EXAM CHEST 2 VIEWS: CPT | Mod: TC

## 2022-07-13 PROCEDURE — 94799 UNLISTED PULMONARY SVC/PX: CPT | Mod: S$GLB,,, | Performed by: INTERNAL MEDICINE

## 2022-07-13 PROCEDURE — 94010 BREATHING CAPACITY TEST: CPT | Mod: S$GLB,,, | Performed by: INTERNAL MEDICINE

## 2022-07-13 PROCEDURE — 94726 PLETHYSMOGRAPHY LUNG VOLUMES: CPT | Mod: S$GLB,,, | Performed by: INTERNAL MEDICINE

## 2022-07-13 NOTE — PROCEDURES
"[facility] - Pulmonary Function  Six Minute Walk     SUMMARY     Ordering Provider: Dr. Machuca   Interpreting Provider: Dr. Machuca  Performing nurse/tech/RT: ROMAN Do, RRT  Diagnosis:  (decreased diffusion capacity of the lung)  Height: 6' 3" (190.5 cm)  Weight: 95.4 kg (210 lb 5.1 oz)  BMI (Calculated): 26.3   Patient Race:             Phase Oxygen Assessment Supplemental O2 Heart   Rate Blood Pressure Bev Dyspnea Scale Rating   Resting 99 % Room Air 75 bpm 100/55 0   Exercise        Minute        1 100 % Room Air 91 bpm     2 100 % Room Air 95 bpm     3 98 % Room Air 104 bpm     4 100 % Room Air 102 bpm     5 100 % Room Air 105 bpm     6  99 % Room Air 102 bpm 144/67 3   Recovery        Minute        1 99 % Room Air 81 bpm     2 99 % Room Air 80 bpm     3 99 % Room Air 77 bpm     4 99 % Room Air 75 bpm 127/60 1     Six Minute Walk Summary  6MWT Status: completed without stopping  Patient Reported: Dyspnea, Lightheadedness     Interpretation:  Did the patient stop or pause?: No            Total Time Walked (Calculated): 360 seconds  Final Partial Lap Distance (feet): 25 feet  Total Distance Meters (Calculated): 251.46 meters  Predicted Distance Meters (Calculated): 568.6 meters  Percentage of Predicted (Calculated): 44.22  Peak VO2 (Calculated): 11.52  Mets: 3.29  Has The Patient Had a Previous Six Minute Walk Test?: Yes       Previous 6MWT Results  Has The Patient Had a Previous Six Minute Walk Test?: Yes  Date of Previous Test: 12/29/21  Total Time Walked: 330 seconds  Total Distance (meters): 182.88  Predicted Distance (meters): 564.03 meters  Percentage of Predicted: 32.42  Percent Change (Calculated): -0.38           CLINICAL INTERPRETATION:  Six minute walk distance is 251.46m (44.22 % predicted) with light dyspnea.  During exercise, there was no significant desaturation while breathing room air.  Blood pressure remained stable and Heart rate remained stable with walking.  The patient " reported non-pulmonary symptoms during exercise.  The patient did complete the study, walking 360 seconds of the 360 second test.  Since the previous study in 12/29/2021, exercise capacity may be somewhat improved.  Based upon age and body mass index, exercise capacity is less than predicted.      [] Mild exercise-induced hypoxemia described as an arterial oxygen saturation of 93-95% (or 3-4% less than at rest)  []  Moderate exercise-induced hypoxemia as 89-93%  []  Severe exercise induced hypoxemia as < 89% O2 saturation.  Medicare Criteria for oxygen prescription comments:   []  When arterial oxygen saturation is at or below 88% during exercise (severe exercise induced hypoxemia) then the patient falls under Medicare Group 1 criteria for supplemental oxygen

## 2022-07-14 ENCOUNTER — TELEPHONE (OUTPATIENT)
Dept: PULMONOLOGY | Facility: CLINIC | Age: 82
End: 2022-07-14
Payer: MEDICARE

## 2022-07-26 DIAGNOSIS — R94.2 RESTRICTIVE VENTILATORY DEFECT: ICD-10-CM

## 2022-07-26 DIAGNOSIS — R94.2 DIFFUSION CAPACITY OF LUNG (DL), DECREASED: Primary | ICD-10-CM

## 2022-07-26 NOTE — PROGRESS NOTES
Orders Placed This Encounter   Procedures    Ambulatory referral/consult to Pulmonary Rehab     Standing Status:   Future     Standing Expiration Date:   8/26/2023     Referral Priority:   Routine     Referral Type:   Consultation     Referral Reason:   Specialty Services Required     Requested Specialty:   Respiratory Therapy     Number of Visits Requested:   1

## 2022-07-28 ENCOUNTER — TELEPHONE (OUTPATIENT)
Dept: PULMONOLOGY | Facility: HOSPITAL | Age: 82
End: 2022-07-28
Payer: MEDICARE

## 2022-07-28 RX ORDER — LANCETS
EACH MISCELLANEOUS
Qty: 100 EACH | Refills: 11 | Status: SHIPPED | OUTPATIENT
Start: 2022-07-28 | End: 2022-08-09 | Stop reason: SDUPTHER

## 2022-07-28 RX ORDER — LANCETS
EACH MISCELLANEOUS
COMMUNITY
End: 2022-07-28 | Stop reason: SDUPTHER

## 2022-07-28 RX ORDER — DEXTROSE 4 G
TABLET,CHEWABLE ORAL
Qty: 1 EACH | Refills: 0 | Status: SHIPPED | OUTPATIENT
Start: 2022-07-28 | End: 2022-08-09 | Stop reason: SDUPTHER

## 2022-07-28 RX ORDER — DEXTROSE 4 G
TABLET,CHEWABLE ORAL
COMMUNITY
End: 2022-07-28 | Stop reason: SDUPTHER

## 2022-07-28 NOTE — TELEPHONE ENCOUNTER
No new care gaps identified.  [facility] Embedded Care Gaps. Reference number: [id number]. 7/28/2022   2:16:43 PM CDT

## 2022-07-29 ENCOUNTER — OFFICE VISIT (OUTPATIENT)
Dept: PULMONOLOGY | Facility: CLINIC | Age: 82
End: 2022-07-29
Payer: MEDICARE

## 2022-07-29 ENCOUNTER — HOSPITAL ENCOUNTER (OUTPATIENT)
Dept: PULMONOLOGY | Facility: HOSPITAL | Age: 82
Discharge: HOME | End: 2022-07-29
Attending: INTERNAL MEDICINE
Payer: MEDICARE

## 2022-07-29 VITALS
HEIGHT: 75 IN | WEIGHT: 215.5 LBS | OXYGEN SATURATION: 100 % | BODY MASS INDEX: 26.79 KG/M2 | HEIGHT: 75 IN | RESPIRATION RATE: 18 BRPM | DIASTOLIC BLOOD PRESSURE: 68 MMHG | HEART RATE: 80 BPM | SYSTOLIC BLOOD PRESSURE: 132 MMHG | BODY MASS INDEX: 26.79 KG/M2 | WEIGHT: 215.5 LBS

## 2022-07-29 DIAGNOSIS — E11.59 HYPERTENSION ASSOCIATED WITH DIABETES: ICD-10-CM

## 2022-07-29 DIAGNOSIS — R94.2 DIFFUSION CAPACITY OF LUNG (DL), DECREASED: ICD-10-CM

## 2022-07-29 DIAGNOSIS — E78.2 COMBINED HYPERLIPIDEMIA ASSOCIATED WITH TYPE 2 DIABETES MELLITUS: Chronic | ICD-10-CM

## 2022-07-29 DIAGNOSIS — I15.2 HYPERTENSION ASSOCIATED WITH DIABETES: ICD-10-CM

## 2022-07-29 DIAGNOSIS — R94.2 RESTRICTIVE VENTILATORY DEFECT: ICD-10-CM

## 2022-07-29 DIAGNOSIS — E11.69 COMBINED HYPERLIPIDEMIA ASSOCIATED WITH TYPE 2 DIABETES MELLITUS: Chronic | ICD-10-CM

## 2022-07-29 DIAGNOSIS — G47.33 OSA (OBSTRUCTIVE SLEEP APNEA): Chronic | ICD-10-CM

## 2022-07-29 DIAGNOSIS — R94.2 DIFFUSION CAPACITY OF LUNG (DL), DECREASED: Primary | ICD-10-CM

## 2022-07-29 PROCEDURE — 1126F AMNT PAIN NOTED NONE PRSNT: CPT | Mod: CPTII,S$GLB,, | Performed by: INTERNAL MEDICINE

## 2022-07-29 PROCEDURE — 1160F RVW MEDS BY RX/DR IN RCRD: CPT | Mod: CPTII,S$GLB,, | Performed by: INTERNAL MEDICINE

## 2022-07-29 PROCEDURE — 3075F PR MOST RECENT SYSTOLIC BLOOD PRESS GE 130-139MM HG: ICD-10-PCS | Mod: CPTII,S$GLB,, | Performed by: INTERNAL MEDICINE

## 2022-07-29 PROCEDURE — 3078F DIAST BP <80 MM HG: CPT | Mod: CPTII,S$GLB,, | Performed by: INTERNAL MEDICINE

## 2022-07-29 PROCEDURE — 3288F FALL RISK ASSESSMENT DOCD: CPT | Mod: CPTII,S$GLB,, | Performed by: INTERNAL MEDICINE

## 2022-07-29 PROCEDURE — 99999 PR PBB SHADOW E&M-EST. PATIENT-LVL III: CPT | Mod: PBBFAC,,, | Performed by: INTERNAL MEDICINE

## 2022-07-29 PROCEDURE — 99214 PR OFFICE/OUTPT VISIT, EST, LEVL IV, 30-39 MIN: ICD-10-PCS | Mod: S$GLB,,, | Performed by: INTERNAL MEDICINE

## 2022-07-29 PROCEDURE — 1101F PT FALLS ASSESS-DOCD LE1/YR: CPT | Mod: CPTII,S$GLB,, | Performed by: INTERNAL MEDICINE

## 2022-07-29 PROCEDURE — 1126F PR PAIN SEVERITY QUANTIFIED, NO PAIN PRESENT: ICD-10-PCS | Mod: CPTII,S$GLB,, | Performed by: INTERNAL MEDICINE

## 2022-07-29 PROCEDURE — 1159F MED LIST DOCD IN RCRD: CPT | Mod: CPTII,S$GLB,, | Performed by: INTERNAL MEDICINE

## 2022-07-29 PROCEDURE — 3072F PR LOW RISK FOR RETINOPATHY: ICD-10-PCS | Mod: CPTII,S$GLB,, | Performed by: INTERNAL MEDICINE

## 2022-07-29 PROCEDURE — 3072F LOW RISK FOR RETINOPATHY: CPT | Mod: CPTII,S$GLB,, | Performed by: INTERNAL MEDICINE

## 2022-07-29 PROCEDURE — 1159F PR MEDICATION LIST DOCUMENTED IN MEDICAL RECORD: ICD-10-PCS | Mod: CPTII,S$GLB,, | Performed by: INTERNAL MEDICINE

## 2022-07-29 PROCEDURE — 3075F SYST BP GE 130 - 139MM HG: CPT | Mod: CPTII,S$GLB,, | Performed by: INTERNAL MEDICINE

## 2022-07-29 PROCEDURE — 3288F PR FALLS RISK ASSESSMENT DOCUMENTED: ICD-10-PCS | Mod: CPTII,S$GLB,, | Performed by: INTERNAL MEDICINE

## 2022-07-29 PROCEDURE — 1101F PR PT FALLS ASSESS DOC 0-1 FALLS W/OUT INJ PAST YR: ICD-10-PCS | Mod: CPTII,S$GLB,, | Performed by: INTERNAL MEDICINE

## 2022-07-29 PROCEDURE — 99999 PR PBB SHADOW E&M-EST. PATIENT-LVL III: ICD-10-PCS | Mod: PBBFAC,,, | Performed by: INTERNAL MEDICINE

## 2022-07-29 PROCEDURE — 99214 OFFICE O/P EST MOD 30 MIN: CPT | Mod: S$GLB,,, | Performed by: INTERNAL MEDICINE

## 2022-07-29 PROCEDURE — G0239 OTH RESP PROC, GROUP: HCPCS

## 2022-07-29 PROCEDURE — 3078F PR MOST RECENT DIASTOLIC BLOOD PRESSURE < 80 MM HG: ICD-10-PCS | Mod: CPTII,S$GLB,, | Performed by: INTERNAL MEDICINE

## 2022-07-29 PROCEDURE — 1160F PR REVIEW ALL MEDS BY PRESCRIBER/CLIN PHARMACIST DOCUMENTED: ICD-10-PCS | Mod: CPTII,S$GLB,, | Performed by: INTERNAL MEDICINE
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
## 2022-08-01 NOTE — PROGRESS NOTES
[facility]  Pulmonary Rehab  Initial Consult    SUMMARY     Referring Physician: Martin Machuca MD   Pt presented today for intake to pulmonary rehab. Pt has a history of decreased diffusion capacity of the lung, Restrictive ventilatory defect, CAD, HTN, Chronic anemia, among other medical issues. Pt is diabetic and has CHARLES. He used his CPAP occasionally. Pt encouraged to be compliant with CPAP usage. Pt recently had a left hip revision but was able to complete his 6 MWT with no stops today. Pt walked 700 ft. Pt complained of light SOB with walk today. He is excited to come to rehab and hopes to be able to breathe better and to walk and exercise at the gym as he did in the past. Pt is set to start 8/4/22.     Diagnosis:   Problem List as of 7/29/2022 Reviewed: 7/29/2022  5:03 PM by Martin Machuca MD       Neuro    Osteoarthritis of spine with radiculopathy, lumbar region    Last Assessment & Plan 3/20/2017 Office Visit Edited 3/20/2017  3:17 PM by Yeison Wilder MD     Needs to take celebrex daily.  Consult Dr. Diaz.           Lumbar spondylosis       Pulmonary    Diffusion capacity of lung (dl), decreased    Last Assessment & Plan 6/15/2022 Office Visit Written 6/15/2022 12:53 PM by Martin Machuca MD     DLCO 64.4%           Restrictive ventilatory defect    Last Assessment & Plan 6/15/2022 Office Visit Written 6/15/2022 12:53 PM by Martin Machuca MD     TLC 77.9%              Cardiac/Vascular    Combined hyperlipidemia associated with type 2 diabetes mellitus    Last Assessment & Plan 9/15/2021 Office Visit Written 9/15/2021 11:31 AM by Yeison Wilder MD     Update labs.    Cholesterol numbers look good.  We will continue the current regimen at this time.  Remain focused on low fat diet and high dietary fiber intake.             Aortic calcification    Last Assessment & Plan 1/12/2018 Office Visit Written 1/12/2018  9:47 AM by Yeison Wilder MD     No intervention  required.  Continue bp control and cholesterol meds and aspirin daily           Hypertension associated with diabetes    Last Assessment & Plan 6/15/2022 Office Visit Written 6/15/2022 12:54 PM by Martin Machuca MD     Stable on COZAAR              Renal/    Benign prostatic hyperplasia       Oncology    Anemia due to unknown mechanism    Last Assessment & Plan 12/8/2020 Office Visit Written 12/8/2020  9:47 AM by Megha George NP     Anemia multifactorial including CKD, chronic disease    Laboratory review overall stable. Needs CBC, CMP. Will add to upcoming lab appointment 12/21/20.    Previously noted Vitamin B12 on the lower end of normal. He was asked to take sublingual Vitamin B12 1,000 mcg daily. He reports taking Vitamin B12/Iron pills daily    EGD/Colonoscopy done 6/2020. EGD revealed gastritis with pathology positive for H. Pylori. Colonoscopy reveal polyps with unremarkable pathology, recommended repeat in 5 years    Previous Referral to Nephrology for CKD III. Appointment cancelled due to COVID concerns. Plans to reschedule pending recent cmp results    Continue B12 supplementation. Continue iron supplement per patient preference. F/u 6 months with repeat labs             Chronic anemia    Last Assessment & Plan 6/3/2022 Office Visit Written 6/3/2022  3:28 PM by Megha George NP     Anemia multifactorial including CKD, chronic disease    Laboratory review overall stable. He remains anemic but hemoglobin remains near baseline hemoglobin 12.7. His baseline is 11-12 range. Anemia workup has been essentially unremarkable. Vitamin B12 on the lower end of normal at pervious visit. He was asked to take sublingual Vitamin B12 1,000 mcg daily.     Previous Referral to Nephrology for CKD III. Appointment cancelled due to COVID concerns.  He has not yet had Nephrology follow  up    EGD/Colonoscopy done 6/2020. EGD revealed gastritis with pathology positive for H. Pylori. Colonoscopy reveal polyps with  unremarkable pathology, recommended repeat in 5 years    Continue B12 supplementation.  Continue iron supplementation per patient preference. F/u 6 months with repeat labs             Leukopenia    Sickle cell trait       Endocrine    Type 2 diabetes mellitus with diabetic polyneuropathy, without long-term current use of insulin    Last Assessment & Plan 4/19/2018 Office Visit Written 4/19/2018  2:36 PM by Yeison Wilder MD     Diabetes continues to do well at this time.  Most recent a1c is 5.6.  We will continue the current regimen.  Focus on a low carbohydrate diet and consistent exercise.               Type 2 diabetes mellitus with diabetic peripheral angiopathy without gangrene, without long-term current use of insulin    Last Assessment & Plan 6/15/2022 Office Visit Written 6/15/2022 12:54 PM by Martin Machuca MD     Stable JARDIANCE,           Diabetes mellitus with stage 3 chronic kidney disease       GI    Colon polyp       Orthopedic    Chronic bilateral low back pain with bilateral sciatica    Last Assessment & Plan 10/21/2021 Office Visit Edited 10/21/2021  1:16 PM by Jennifer Patel NP     Somewhat improved post spine surgery           Idiopathic chronic gout of right foot without tophus    Primary osteoarthritis of left hip    Arthritis of left knee    Sacroiliac joint pain       Other    PLMD (periodic limb movement disorder)    Last Assessment & Plan 10/21/2021 Office Visit Written 10/21/2021  1:02 PM by Jennifer Patel NP     3/1/2017 PSG PLMI was 69/hr and PLMAi was 17.7/hr  Had 430 leg movements with 110 causing arousal  States leg movement not bothersome since lumbar spine surgery and hip surgery.   Not on Mirapex           CHARLES (obstructive sleep apnea)    Last Assessment & Plan 10/21/2021 Office Visit Written 10/21/2021  1:00 PM by Jennifer Patel NP     3/1/2017 PSG borderline obstructive sleep apnea the overall AHI was 6.1 (38 events). Patient was reluctant to use CPAP in past.  And states he does not want re-evaluation for obstructive sleep apnea, he does not want to wear CPAP at night.              Controlled REM sleep behavior disorder    Last Assessment & Plan 1/5/2022 Office Visit Written 1/5/2022  2:47 PM by Martin Machuca MD     Sleep good  No issues  Still have dreams: VIVID             Impaired functional mobility, balance, and endurance          History of Present Illness:     Smoking History:   Social History     Tobacco Use   Smoking Status Never Smoker   Smokeless Tobacco Never Used       Medications:   Current Outpatient Medications Ordered in Epic   Medication Sig Dispense Refill    albuterol (VENTOLIN HFA) 90 mcg/actuation inhaler Inhale 2 puffs into the lungs every 6 (six) hours as needed for Wheezing or Shortness of Breath. Rescue 18 g 3    allopurinoL (ZYLOPRIM) 100 MG tablet TAKE 1 TABLET EVERY DAY 90 tablet 3    aspirin (ECOTRIN) 81 MG EC tablet Take 1 tablet (81 mg total) by mouth once daily.  0    bisacodyl (DULCOLAX) 10 mg Supp   0    blood glucose control, low Soln by Misc.(Non-Drug; Combo Route) route.      blood sugar diagnostic (TRUE METRIX GLUCOSE TEST STRIP) Strp Use as directed to check sugars 100 strip 11    blood-glucose meter Misc Use as directed to check sugars 1 each 0    cetirizine (ZYRTEC) 10 MG tablet Take 1 tablet (10 mg total) by mouth once daily. for 14 days 14 tablet 0    clonazePAM (KLONOPIN) 0.5 MG tablet TAKE 1 TABLET BY MOUTH EVERY EVENING 30 tablet 2    cyanocobalamin, vitamin B-12, 1,000 mcg Subl Place 1,000 mcg under the tongue once daily. 90 tablet 3    diclofenac sodium (VOLTAREN) 1 % Gel Apply 2 g topically 4 (four) times daily. 100 g 5    ferrous sulfate 324 mg (65 mg iron) TbEC Take 1 tablet (324 mg total) by mouth once daily.      finasteride (PROSCAR) 5 mg tablet Take 1 tablet (5 mg total) by mouth once daily. (Patient not taking: Reported on 4/14/2022) 30 tablet 6    FLUZONE HIGHDOSE QUAD 21-22  mcg/0.7 mL  "Syrg       gabapentin (NEURONTIN) 600 MG tablet TAKE 1 TABLET(600 MG) BY MOUTH TWICE DAILY 180 tablet 2    GARLIC EXTRACT ORAL Take 1 tablet by mouth once daily. Per [facility]      JARDIANCE 10 mg tablet TAKE 1 TABLET(10 MG) BY MOUTH EVERY DAY 90 tablet 0    lancets Misc Use as directed to check sugars 100 each 11    LEXISCAN 0.4 mg/5 mL Syrg       losartan (COZAAR) 50 MG tablet Take 2 tablets (100 mg total) by mouth once daily. 180 tablet 3    metFORMIN (GLUCOPHAGE) 1000 MG tablet Take 1 tablet (1,000 mg total) by mouth 2 (two) times daily with meals. 180 tablet 3    metoprolol succinate (TOPROL-XL) 50 MG 24 hr tablet Take 1 tablet (50 mg total) by mouth once daily. 90 tablet 3    moxifloxacin (VIGAMOX) 0.5 % ophthalmic solution       multivitamin (THERAGRAN) per tablet Take 1 tablet by mouth once daily.      papaverine 30 mg/mL injection Inject 0.3 ml of trimix solution prn ED max once daily  Prepare 10ml of trimix solution containing:    Papaverine 30mg/ml    Phentolamine 1 mg/ml    Alprostadil 10mcg/ml  Dispense 10ml per refill  Qs syringes 1cc/30g/0.5" and alcohol swabs dispense as needed for Intracavernosal injection 10 mL 5    pravastatin (PRAVACHOL) 40 MG tablet Take 1 tablet (40 mg total) by mouth once daily. 90 tablet 0    sildenafiL (VIAGRA) 100 MG tablet Take 1 tablet (100 mg total) by mouth daily as needed for Erectile Dysfunction. 30 tablet 2    tamsulosin (FLOMAX) 0.4 mg Cap Take 1 capsule (0.4 mg total) by mouth once daily. 90 capsule 3     No current [facility]-ordered facility-administered medications on file.        Pulmonary History Questionnaire:    Pulmonary History  How many times have you been hospitalized in the last year as a result of lung problems?: 0  How many days were you in the hospital in the last year as a result of breathing difficulty?: 0  How many ER visits have you had in the past year as a result of breathing difficulty?: 0  Have you ever attended a pulmonary " rehabilitation program?: No  Have you ever had any chest injuries or surgeries?: No  Do you have any physical limitations that may affect your ability to exercise?: other (comment) (legs hurt when trying to walk long distances. Back pain also)    Major Symptomatology  What are your symptoms today?: SOB, weak, back pain  What were your symptoms last year?: SOB, weak, trouble walking long distance  What were your symptoms 5 years ago?: all good, working, exercising, going to gym  When did you realize that you had lung problems?: 3 years ago after back, hip and other ortho surgeries    Disease Impact  Do you sleep with your head elevated?: Elevated  If elevated, how high?: 1 pillow  Do you awaken during the night?: Yes  How often?: 2 times  Why?: bathroom  Do your ankles ever swell up?: No  Do you cough?: No  Do you cough up sputum?: No  Do you use oxygen?: No  Are you on any home respiratory theraphy?: No  Do you exercise?: No  Do you have exercise equipment?: Yes  Type: treadmill  Has your physician limited your activities?: No    Depression PHQ:    Depression Patient Health Questionnaire (PHQ-9)  Over the last two weeks how often have you been bothered by little interest or pleasure in doing things: Not at all  Over the last two weeks how often have you been bothered by feeling down, depressed or hopeless: Not at all  Over the last two weeks how often have you been bothered by trouble falling or staying asleep, or sleeping too much: Not at all  Over the last two weeks how often have you been bothered by feeling tired or having little energy: Several days  Over the last two weeks how often have you been bothered by a poor appetite or overeating: Not at all  Over the last two weeks how often have you been bothered by feeling bad about yourself - or that you are a failure or have let yourself or your family down: Not at all  Over the last two weeks how often have you been bothered by trouble concentrating on things,  "such as reading the newspaper or watching television: Not at all  Over the last two weeks how often have you been bothered by moving or speaking so slowly that other people could have noticed. Or the opposite - being so fidgety or restless that you have been moving around a lot more than usual.: Not at all  Over the last two weeks how often have you been bothered by thoughts that you would be better off dead, or of hurting yourself: Not at all  If you checked off any problems, how difficult have these problems made it for you to do your work, take care of things at home or get along with other people?: Not difficult at all  PHQ-9 Score: 1  PHQ-9 Interpretation: Minimal or None    Questionnaire Score:   Pulmonary Knowledge Test: 50  Rate Your Plate: 39  SF36 Physical Functionin  SF36 Mental Health: 56     Physical Health Summary  Your Score: 28  Your current score indicates that you are well below the general population of similar age and gender.  Compared to this population your health is:    Well Below the average in:  Overall physical functioning  Performance at work, home, or school due to physical problems  Ability to participate in activities due to bodily pain  Same or Better than the average in:  ?  ?Mental Health Summary  Your Score: 56  Health  Your current score indicates that you are the same or better than the general population of similar age and gender.  Compared to this population your health is:    Well Below the average in:  Ability to participate in social activities  Below the average in:  Performance at work, home, or school due to emotional problems  Same or Better than the average in:  Level of energy  Emotional well-being    Progress    Self Evaluated Transition  Compared to one year ago, you rated your health in general as: Somewhat better    Pulmonary Function Test Data:     Date: 2022 FEV1: 123.2 FVC: 114.5 DLCO: 60.4    Six Minute Walk    Height: 6' 3" (190.5 cm) Weight: 97.8 kg " (215 lb 8 oz)  Performing RT: RB, RRT     Phase Oxygen Assessment Supplemental O2 Heart   Rate Blood Pressure Bev Dyspnea Scale Rating   Resting 97 % Room Air 81 bpm 126/63 0.5   Exercise        Minute        1 98 % Room Air 89 bpm     2 96 % Room Air 92 bpm     3 96 % Room Air 97 bpm     4 95 % Room Air 97 bpm     5 95 % Room Air 97 bpm     6  92 % Room Air 99 bpm 182/85 2   Recovery        Minute        1 98 % Room Air 93 bpm     2 98 % Room Air 91 bpm     3 98 % Room Air 89 bpm     4 98 % Room Air 84 bpm 153/71 1       Six Minute Walk Summary       Total Time Walked (Calculated): 360 seconds  Total Distance Meters (Calculated): 213.36 meters  Predicted Distance Meters (Calculated): 564.47 meters  Percentage of Predicted (Calculated): 37.8  Peak VO2 (Calculated): 10.38  Mets: 2.97  Symptoms: dyspnea          Fall Risk:  Fall Risk Assessment (every shift)  History Of Fall (W/I 3 Mos): Y  Age Greater Than 65 Years: Y  Dizziness/Vertigo: Y  Male: Y    Individual Goal Review  Individual Goal Review  Are you exercising on a regular basis at home?: No  Dietary goal:: Eat more healthy, need advide  Are you a diabetic?: Yes  Do you monitor your blood sugar at home as ordered by your physician?: Yes  Are you better able to manage your daily activities, i.e. grooming, bathing, cooking, etc.?: Yes  Are you smoke free?: Yes  Has your knowledge of stress management increased?: N/A  Do you have a positive support system in place?: Yes  Your short term goal was:: improve breathing so he can walk and exercise again  Your long term goal was:: Breathe better, learn how to live better with breathing issues    Education: Pulmonary rehab program, what it consists of, pt expectations, benefits, compliance, questions answered. Pt expressed understanding.    Short Term Goals: Improve breathing so he can walk and exercise again.  Long Term Goals: Breathe better, learn how to live better with breathing issues.  Dietary Goals: Eat more  healthy      Checklist                                                                           Response  Chronic Stable Pulmonary Impairment                             Yes  Medical Regimen Optimized                                             Yes  PFT within 12 months                                                       Yes  Impaired Function due to Pulmonary Disease                 Yes  Motivated and Physically able to Participate                     Yes  Rehab likely to result in improvement                               Yes    Orders: Begin pulmonary rehabilitation 2 times a week, see exercise prescription.    [facility]  Pulmonary Rehab  Exercise Prescription    SUMMARY     General Guidelines     · Record Pulse, SPO2, and Blood Pressure before during and after exercise   · Hold exercise for: Oxygen saturation <90% despite supplemental Oxygen, S/Sx Exacerbation, Blood Pressure >180/95 or <80/60, Resting pulse 85>113 Max target heart rate  · Maintain SPO2>90% with exercise    Outpatient Exercises  Days per week: 2 Days  Minutes: 45    Home Exercise  Days per week: 2 Days  Minutes: 45    Exercise Prescription  Initial Exercise Prescription: Yes    Modality  Modality: Arm Ergometer, Hand Free Weights, Nustep, Recumbent Bike    Arm Ergometer  Time: 10 minutes  Level: 1      Nustep  Time: 20 minutes  Steps: 1000  Load: 4    Recumbent Bike  Time: 10 minutes  Level: 1    Biceps  lbs: 3 lbs (dumbbells)  Sets: 2  Reps: 10    Chest  lbs: 3 lbs (dumbbells)  Sets: 2  Reps: 10    I certify the patient is physically and psychologically able to participate in pulmonary rehabilitation and is medically stable.

## 2022-08-03 DIAGNOSIS — R06.02 SOB (SHORTNESS OF BREATH): Primary | ICD-10-CM

## 2022-08-04 ENCOUNTER — HOSPITAL ENCOUNTER (OUTPATIENT)
Dept: PULMONOLOGY | Facility: HOSPITAL | Age: 82
Discharge: HOME | End: 2022-08-04
Attending: INTERNAL MEDICINE
Payer: MEDICARE

## 2022-08-04 PROCEDURE — G0239 OTH RESP PROC, GROUP: HCPCS

## 2022-08-05 VITALS — WEIGHT: 212.5 LBS | BODY MASS INDEX: 26.56 KG/M2

## 2022-08-05 NOTE — PROGRESS NOTES
[facility]  Pulmonary Rehab  Session Summary    SUMMARY     Session Data  Session Number: 2  Time In: 1100  Time Out: 1200  Weight: 96.4 kg (212 lb 8 oz)  Target Heart Rate Zone: Minimum: 85 bpm  Target Heart Rate Zone: Maximum: 113 bpm  Patient Motivation: Excellent  Patient Effort: Excellent      Pre Exercise Vitals  SpO2: 98 %  Supplemental O2?: No  Pulse: 75  BP: 125/69  Bev Dyspnea Rating : 1      During Exercise Vitals  SpO2: 97 %  Supplemental O2?: No  Pulse: 81  BP: 134/65  Bev Dyspnea Rating : 2      Post Exercise Vitals  SpO2: 95 %  Supplemental O2?: No  Pulse: 80  BP: 100/58  Bev Dyspnea Rating : 2       Modality  Modality: Arm Ergometer, Hand Free Weights, Nustep, Recumbent Bike      Arm Ergometer  Time: 10 minutes (.94 miles)  Level: 1 (1.9 mets)      Nustep  Time: 20 minutes  Steps: 1645  Load: 4  Mets: 2.5      Recumbent Bike  Time: 10 minutes (1.06 miles)  Level: 1  Mets: 2.1       Biceps  lbs: 3 lbs (dumbbells)  Sets: 2  Reps: 10      Chest  lbs: 3 lbs (dumbbells)  Sets: 2  Reps: 10     Pulmonary Rehab COVID19 Screening Checklist    1. Are you having any of the following physical symptoms?   Yes [] No [x]      Fever or chills   Unexplained muscle or body aches   Cough   Shortness of breath or difficulty breathing   Fatigue   Headache   New loss of taste or smell   Sore throat   Congestion or runny nose   Nausea or vomiting   Diarrhea      2.  Have you come in close contact with anyone with the above symptoms or with known COVID19 in the last 14 days? Yes [] No [x]        3.  Have you tested positive for COVID19 in the last 30 days?   Yes [] No [x]         Education  Proper heart rate zone  Pursed lip breathing  Equipment use and safety  COPD Action plan      Therapist Notes   Excellent effort. Today was pt's first exercise day. He did very well. He met all his initial goals. Pt is excited to be in rehab.Vitals were stable. Will continue to motivate and educate pt in  rehab.    Dr. Wu immediately available as needed.

## 2022-08-09 ENCOUNTER — HOSPITAL ENCOUNTER (OUTPATIENT)
Dept: PULMONOLOGY | Facility: HOSPITAL | Age: 82
Discharge: HOME | End: 2022-08-09
Attending: INTERNAL MEDICINE
Payer: MEDICARE

## 2022-08-09 VITALS — BODY MASS INDEX: 26.32 KG/M2 | WEIGHT: 210.63 LBS

## 2022-08-09 PROCEDURE — G0239 OTH RESP PROC, GROUP: HCPCS

## 2022-08-09 RX ORDER — DEXTROSE 4 G
TABLET,CHEWABLE ORAL
Qty: 1 EACH | Refills: 0 | Status: SHIPPED | OUTPATIENT
Start: 2022-08-09 | End: 2023-02-09

## 2022-08-09 RX ORDER — LANCETS
EACH MISCELLANEOUS
Qty: 100 EACH | Refills: 11 | Status: SHIPPED | OUTPATIENT
Start: 2022-08-09 | End: 2023-12-22

## 2022-08-09 NOTE — PROGRESS NOTES
[facility]  Pulmonary Rehab  Session Summary    SUMMARY     Session Data  Session Number: 3  Time In: 1100  Time Out: 1200  Weight: 95.5 kg (210 lb 9.6 oz)  Target Heart Rate Zone: Minimum: 85 bpm  Target Heart Rate Zone: Maximum: 113 bpm  Patient Motivation: Excellent  Patient Effort: Excellent      Pre Exercise Vitals  SpO2: 98 %  Supplemental O2?: No  Pulse: 74  BP: 105/59  Bev Dyspnea Rating : 1      During Exercise Vitals  SpO2: 97 %  Supplemental O2?: No  Pulse: 85  BP: 146/51  Bev Dyspnea Rating : 2      Post Exercise Vitals  SpO2: 93 %  Supplemental O2?: No  Pulse: 88  BP: 109/51  Bev Dyspnea Rating : 2       Modality  Modality: Arm Ergometer, Hand Free Weights, Nustep, Recumbent Bike      Arm Ergometer  Time: 10 minutes (.95 miles)  Level: 1 (1.8 mets)      Nustep  Time: 20 minutes  Steps: 1601  Load: 4  Mets: 2.4      Recumbent Bike  Time: 10 minutes  RPM:  (1.09 miles)  Level: 1  Mets: 2      Biceps  lbs: 3 lbs (dumbbells)  Sets: 2  Reps: 10      Chest  lbs: 3 lbs (dumbbells)  Sets: 2  Reps: 10     Pulmonary Rehab COVID19 Screening Checklist    1. Are you having any of the following physical symptoms?   Yes [] No [x]      Fever or chills   Unexplained muscle or body aches   Cough   Shortness of breath or difficulty breathing   Fatigue   Headache   New loss of taste or smell   Sore throat   Congestion or runny nose   Nausea or vomiting   Diarrhea      2.  Have you come in close contact with anyone with the above symptoms or with known COVID19 in the last 14 days? Yes [] No [x]        3.  Have you tested positive for COVID19 in the last 30 days?   Yes [] No [x]         Education  Maximizing energy  Pursed lip breathing      Therapist Notes   Excellent effort. Pt exercised on equipment as charted above. He tolerated exercises well. Will continue to motivate and educate pt in rehab.    Dr. Pereira immediately available as needed.

## 2022-08-09 NOTE — TELEPHONE ENCOUNTER
No new care gaps identified.  [facility] Embedded Care Gaps. Reference number: [id number]. 8/09/2022   7:50:17 AM CDT

## 2022-08-11 ENCOUNTER — HOSPITAL ENCOUNTER (OUTPATIENT)
Dept: PULMONOLOGY | Facility: HOSPITAL | Age: 82
Discharge: HOME | End: 2022-08-11
Attending: INTERNAL MEDICINE
Payer: MEDICARE

## 2022-08-11 VITALS — WEIGHT: 210.56 LBS | BODY MASS INDEX: 26.32 KG/M2

## 2022-08-11 PROCEDURE — G0239 OTH RESP PROC, GROUP: HCPCS

## 2022-08-11 NOTE — PROGRESS NOTES
[facility] - [facility]  Pulmonary Rehab  Session Summary    SUMMARY     Session Data  Session Number: 4  Time In: 1100  Time Out: 1200  Weight: 95.5 kg (210 lb 8.6 oz)  Target Heart Rate Zone: Minimum: 85 bpm  Target Heart Rate Zone: Maximum: 113 bpm  Patient Motivation: Excellent  Patient Effort: Excellent      Pre Exercise Vitals  SpO2: 98 %  Supplemental O2?: No  Pulse: 74  BP: 126/60  Bev Dyspnea Rating : 1      During Exercise Vitals  SpO2: 98 %  Supplemental O2?: No  Pulse: 88  BP: 128/62  Bev Dyspnea Rating : 2      Post Exercise Vitals  SpO2: 94 %  Supplemental O2?: No  Pulse: 85  BP: 133/65  Bev Dyspnea Rating : 3       Modality  Modality: Arm Ergometer, Hand Free Weights, Nustep, Recumbent Bike      Arm Ergometer  Time: 10 minutes (1.9 miles)  Level: 1.5 (1.9 mets)      Nustep  Time: 20 minutes  Steps: 1492  Load: 4  Mets: 2.5      Recumbent Bike  Time: 10 minutes (1.09 miles)  Level: 1  Mets: 1.8      Biceps  lbs: 4 lbs (dumbbells)  Sets: 2  Reps: 10      Chest  lbs: 4 lbs (dumbbells)  Sets: 2  Reps: 10      Education  Maximizing energy  Pursed lip breathing    Pulmonary Rehab COVID19 Screening Checklist    1. Are you having any of the following physical symptoms?   Yes [] No [x]      Fever or chills   Unexplained muscle or body aches   Cough   Shortness of breath or difficulty breathing   Fatigue   Headache   New loss of taste or smell   Sore throat   Congestion or runny nose   Nausea or vomiting   Diarrhea      2.  Have you come in close contact with anyone with the above symptoms or with known COVID19 in the last 14 days? Yes [] No [x]        3.  Have you tested positive for COVID19 in the last 30 days?   Yes [] No [x]         Therapist Notes   Excellent effort. Increased to level 1.5 on arm ergometer for 10 mins and to 4 lb dumbbells for chest and bicep exercises. He tolerated all exercises and changes well. Vitals were stable throughout session. Will continue to motivate and educate  pt in rehab.    Dr. Pereira immediately available as needed.

## 2022-08-16 ENCOUNTER — HOSPITAL ENCOUNTER (OUTPATIENT)
Dept: PULMONOLOGY | Facility: HOSPITAL | Age: 82
Discharge: HOME | End: 2022-08-16
Attending: INTERNAL MEDICINE
Payer: MEDICARE

## 2022-08-16 VITALS — BODY MASS INDEX: 26.12 KG/M2 | WEIGHT: 209 LBS

## 2022-08-16 PROCEDURE — G0239 OTH RESP PROC, GROUP: HCPCS

## 2022-08-16 NOTE — PROGRESS NOTES
[facility]  Pulmonary Rehab  Session Summary    SUMMARY     Session Data  Session Number: 5  Time In: 1100  Time Out: 1200  Weight: 94.8 kg (209 lb)  Target Heart Rate Zone: Minimum: 85 bpm  Target Heart Rate Zone: Maximum: 113 bpm  Patient Motivation: Excellent  Patient Effort: Excellent      Pre Exercise Vitals  SpO2: 98 %  Supplemental O2?: No  Pulse: 73  BP: 108/54  Bev Dyspnea Rating : 1      During Exercise Vitals  SpO2: 99 %  Supplemental O2?: No  Pulse: 79  BP: 121/59  Bev Dyspnea Rating : 2      Post Exercise Vitals  SpO2: 100 %  Supplemental O2?: No  Pulse: 82  BP: 127/59  Bev Dyspnea Rating : 2       Modality  Modality: Arm Ergometer, Hand Free Weights, Nustep, Recumbent Bike, Treadmill      Arm Ergometer  Time: 10 minutes (.93 miles)  Level: 1.5 (1.9 mets)      Nustep  Time: 20 minutes  Steps: 1489  Load: 4  Mets: 2.5      Recumbent Bike  Time: 10 minutes  RPM:  (1.09 miles)  Level: 1  Mets: 1.8      Treadmill  Time: 3 minutes  MPH: 1.2 MPH  stGstrstastdstest:st st1st Biceps  lbs: 4 lbs (dumbbells)  Sets: 2  Reps: 10      Chest  lbs: 4 lbs (dumbbells)  Sets: 2  Reps: 10     Pulmonary Rehab COVID19 Screening Checklist    1. Are you having any of the following physical symptoms?   Yes [] No [x]      Fever or chills   Unexplained muscle or body aches   Cough   Shortness of breath or difficulty breathing   Fatigue   Headache   New loss of taste or smell   Sore throat   Congestion or runny nose   Nausea or vomiting   Diarrhea      2.  Have you come in close contact with anyone with the above symptoms or with known COVID19 in the last 14 days? Yes [] No [x]        3.  Have you tested positive for COVID19 in the last 30 days?   Yes [] No [x]         Education  Maximizing energy  Stress and SOB      Therapist Notes   Excellent effort. Introduced treadmill today. Pt walked 3 mins at 1.2 mph, 0 incline. He exercised on Nustep, arm ergometer and recumbent bike and with free weights as charted above.  He tolerated all exercises well. Will continue to motivate and educate pt in rehab.    Dr. Machuca immediately available as needed.

## 2022-08-18 ENCOUNTER — HOSPITAL ENCOUNTER (OUTPATIENT)
Dept: PULMONOLOGY | Facility: HOSPITAL | Age: 82
Discharge: HOME | End: 2022-08-18
Attending: INTERNAL MEDICINE
Payer: MEDICARE

## 2022-08-18 VITALS — WEIGHT: 208.88 LBS | BODY MASS INDEX: 26.11 KG/M2

## 2022-08-18 PROCEDURE — G0239 OTH RESP PROC, GROUP: HCPCS

## 2022-08-18 NOTE — PROGRESS NOTES
[facility]  Pulmonary Rehab  Session Summary    SUMMARY     Session Data  Session Number: 6  Time In: 1100  Time Out: 1200  Weight: 94.8 kg (208 lb 14.4 oz)  Target Heart Rate Zone: Minimum: 85 bpm  Target Heart Rate Zone: Maximum: 113 bpm  Patient Motivation: Excellent  Patient Effort: Excellent      Pre Exercise Vitals  SpO2: 98 %  Supplemental O2?: No  Pulse: 78  BP: 96/59  Bev Dyspnea Rating : 1      During Exercise Vitals  SpO2: 98 %  Supplemental O2?: No  Pulse: 84  BP: 122/73  Bev Dyspnea Rating : 1      Post Exercise Vitals  SpO2: 100 %  Supplemental O2?: No  Pulse: 90  BP: 129/67  Bev Dyspnea Rating : 1       Modality  Modality: Arm Ergometer, Hand Free Weights, Nustep, Recumbent Bike, Treadmill      Arm Ergometer  Time: 10 minutes (.94 miles)  Level: 1.5 (1.8 mets)    Nustep  Time: 20 minutes  Steps: 1575  Load: 4  Mets: 2.5      Recumbent Bike  Time: 10 minutes (1.08 miles)  Level: 1  Mets: 1.9      Treadmill  Time: 3 minutes  MPH: 1.2 MPH  stGstrstastdstest:st st1st Biceps  lbs: 4 lbs (dumbbells)  Sets: 2  Reps: 10      Chest  lbs: 4 lbs (dumbbells)  Sets: 2  Reps: 10     Pulmonary Rehab COVID19 Screening Checklist    1. Are you having any of the following physical symptoms?   Yes [] No [x]      Fever or chills   Unexplained muscle or body aches   Cough   Shortness of breath or difficulty breathing   Fatigue   Headache   New loss of taste or smell   Sore throat   Congestion or runny nose   Nausea or vomiting   Diarrhea      2.  Have you come in close contact with anyone with the above symptoms or with known COVID19 in the last 14 days? Yes [] No [x]        3.  Have you tested positive for COVID19 in the last 30 days?   Yes [] No [x]         Education  Cycle of inactivity  Pursed lip breathing      Therapist Notes   Excellent effort. Pt achieved 86 more steps in 20 mins on Nustep, load 4 today. He gave a great effort on all machines. BP was low today on pre vitals. Was taken several times  with different cuffs. BP came up with exercise and was 129/67 prior to leaving rehab. Pt stated he felt fine. Pt advised to monitor it closely at home for the next several days and contact his PCP if still low. Will continue to motivate and educate pt in rehab.    Dr. Machuca immediately available as needed.

## 2022-08-23 ENCOUNTER — HOSPITAL ENCOUNTER (OUTPATIENT)
Dept: PULMONOLOGY | Facility: HOSPITAL | Age: 82
Discharge: HOME | End: 2022-08-23
Attending: INTERNAL MEDICINE
Payer: MEDICARE

## 2022-08-23 VITALS — WEIGHT: 212.38 LBS | BODY MASS INDEX: 26.55 KG/M2

## 2022-08-23 PROCEDURE — G0239 OTH RESP PROC, GROUP: HCPCS

## 2022-08-23 NOTE — PROGRESS NOTES
[facility]  Pulmonary Rehab  Session Summary    SUMMARY     Session Data  Session Number: 7  Time In: 1100  Time Out: 1200  Weight: 96.3 kg (212 lb 6.4 oz)  Target Heart Rate Zone: Minimum: 85 bpm  Target Heart Rate Zone: Maximum: 113 bpm  Patient Motivation: Excellent  Patient Effort: Excellent      Pre Exercise Vitals  SpO2: 98 %  Supplemental O2?: No  Pulse: 83  BP: 134/67  Bev Dyspnea Rating : 2      During Exercise Vitals  SpO2: 96 %  Supplemental O2?: No  Pulse: 88  BP: 114/50  Bev Dyspnea Rating : 2      Post Exercise Vitals  SpO2: 99 %  Supplemental O2?: No  Pulse: 91  BP: 106/52  Bev Dyspnea Rating : 2       Modality  Modality: Arm Ergometer, Hand Free Weights, Nustep, Recumbent Bike      Arm Ergometer  Time: 10 minutes (1.8 mets)  Level: 1.5 (.93 miles)       Nustep  Time: 20 minutes  Steps: 1508  Load: 4  Mets: 2.6      Recumbent Bike  Time: 10 minutes (1.2 miles)  Level: 1  Mets: 2       Biceps  lbs: 4 lbs (dumbbells)  Sets: 2  Reps: 10      Chest  lbs: 4 lbs (dumbbells)  Sets: 2  Reps: 10     Pulmonary Rehab COVID19 Screening Checklist    1. Are you having any of the following physical symptoms?   Yes [] No [x]      Fever or chills   Unexplained muscle or body aches   Cough   Shortness of breath or difficulty breathing   Fatigue   Headache   New loss of taste or smell   Sore throat   Congestion or runny nose   Nausea or vomiting   Diarrhea      2.  Have you come in close contact with anyone with the above symptoms or with known COVID19 in the last 14 days? Yes [] No [x]        3.  Have you tested positive for COVID19 in the last 30 days?   Yes [] No [x]         Education  Tips for safe exercise  Avoiding allergens and irritants      Therapist Notes   Excellent effort. Pt tolerated all exercises well. No changes with levels or time on equipment today. Will continue to motivate and educate pt in rehab.    Dr. Machuca immediately available as needed.

## 2022-08-24 ENCOUNTER — HOSPITAL ENCOUNTER (OUTPATIENT)
Dept: PULMONOLOGY | Facility: HOSPITAL | Age: 82
Discharge: HOME | End: 2022-08-24
Attending: INTERNAL MEDICINE
Payer: MEDICARE

## 2022-08-24 PROCEDURE — G0239 OTH RESP PROC, GROUP: HCPCS

## 2022-08-24 NOTE — PROGRESS NOTES
[facility]  Pulmonary Rehab  30 Day Evaluation and Recertification    SUMMARY       Summary of Progress: Srinath Mcknight has been doing excellent in rehab.He is increasing his exercise tolerance and will continue to benefit from pulmonary rehab. Pt works hard in his sessions but has hip and leg pain. He has a treadmill at home and uses free weights at home. Pt is attentive during educational topics and participates. Will continue to motivate and educate pt while striving to increase his strength and endurance in rehab.    Attends:     Patient attends 2 days a week and has completed 7 visits.  The patient's current compliance is 100%.    Start date:  7/29/22    Problems this Certification Period:   Leg and hip pain    Referring provider:  Dr. Machuca    Exercise Capacity Summary      Nustep Date:  8/4/22   Time Load Steps Date:  8/23/22 Time Load Steps     20 4 1645  20 4 1508   Recumbent Bike Date:  8/4/22  (1.06 miles)   Time Level Date:  8/23/22  (1.20 miles) Time Level     10 1  10 1   Treadmill Date:     Time Speed Grade Date:  8/16/22 Time Speed Grade         3 1.2 0   Arm Ergometer Date:  8/4/22  (0.94 miles)   Time Level Date:  8/23/22  (0.93 miles) Time Level     10 1  10 1.5   Free Weights Date:  8/4/22  (dumb)   Bicep Curls  Chest Press  Triceps  Legs lbs Sets Reps Date:  8/23/22  (dumb) Bicep Curls  Chest Press  Triceps  Legs lbs Sets Reps      3 2 10   4 2 10                                                       Education:  Equipment use and safety  Proper heart rate zone  Pursed lip breathing  Maximizing energy  Tips for safe exercise  Avoiding allergens and irritants    Goals:  met    Updated Exercise Prescription:  Endurance Training: Nustep, 25 minutes, load 4, 1500 steps  Strength Training: Recumbent bike, level 2, 10 minutes         I certify that the patient is making progress in pulmonary rehabilitation, is physically able to participate, medically stable and remains motivated.

## 2022-08-25 NOTE — PROGRESS NOTES
[facility]  Pulmonary Rehab  Session Summary    SUMMARY     Session Data  Session Number: 8  Time In: 1100  Time Out: 1200  Target Heart Rate Zone: Minimum: 85 bpm  Target Heart Rate Zone: Maximum: 113 bpm  Patient Motivation: Excellent      Pre Exercise Vitals  SpO2: 98 %  Supplemental O2?: No  Pulse: 78  BP: 120/58  Bev Dyspnea Rating : 2      During Exercise Vitals  SpO2: 98 %  Supplemental O2?: No  Pulse: 89  BP: 119/58  Bev Dyspnea Rating : 2      Post Exercise Vitals  SpO2: 98 %  Supplemental O2?: No  Pulse: 89  BP: 107/61  Bev Dyspnea Rating : 2       Modality  Modality: Arm Ergometer, Hand Free Weights, Nustep, Recumbent Bike      Arm Ergometer  Time: 10 minutes  Level: 2      Nustep  Time: 25 minutes  Steps: 2001  Load: 4  Mets: 2.8      Treadmill  Time: 5 minutes  MPH: 1.2 MPH  stGstrstastdstest:st st1st Biceps  lbs: 4 lbs (dumbbells)  Sets: 2  Reps: 10      Chest  lbs: 4 lbs (dumbbells)  Sets: 2  Reps: 10     Pulmonary Rehab COVID19 Screening Checklist    1. Are you having any of the following physical symptoms?   Yes [] No [x]      Fever or chills   Unexplained muscle or body aches   Cough   Shortness of breath or difficulty breathing   Fatigue   Headache   New loss of taste or smell   Sore throat   Congestion or runny nose   Nausea or vomiting   Diarrhea      2.  Have you come in close contact with anyone with the above symptoms or with known COVID19 in the last 14 days? Yes [] No [x]        3.  Have you tested positive for COVID19 in the last 30 days?   Yes [] No [x]       Education  Tips for safe exercise  Avoiding allergens and irritants      Therapist Notes   Excellent effort. Increased time on treadmill to 5 mins at 1.2 mph, 0 incline. Pt achieved 30 day goal on Nustep achieving 2001 steps in 25 mins on load 4. He tolerated all exercises well. Vitals stable. Will continue to motivate and educate pt in rehab.    Dr. Machuca immediately available as needed.

## 2022-08-30 ENCOUNTER — HOSPITAL ENCOUNTER (OUTPATIENT)
Dept: PULMONOLOGY | Facility: HOSPITAL | Age: 82
Discharge: HOME | End: 2022-08-30
Attending: INTERNAL MEDICINE
Payer: MEDICARE

## 2022-08-30 VITALS — BODY MASS INDEX: 26.91 KG/M2 | WEIGHT: 215.31 LBS

## 2022-08-30 PROCEDURE — G0239 OTH RESP PROC, GROUP: HCPCS

## 2022-08-30 NOTE — RESPIRATORY THERAPY
[facility]  Pulmonary Rehab  Session Summary    SUMMARY     Session Data  Session Number: 9  Time In: 1100  Time Out: 1200  Weight: 97.7 kg (215 lb 4.8 oz)  Target Heart Rate Zone: Minimum: 85 bpm  Target Heart Rate Zone: Maximum: 113 bpm  Patient Motivation: Excellent  Patient Effort: Excellent      Pre Exercise Vitals  SpO2: 98 %  Supplemental O2?: No  Pulse: 76  BP: 119/66  Bev Dyspnea Rating : 2      During Exercise Vitals  SpO2: 98 %  Supplemental O2?: No  Pulse: 79  BP: 145/64  Bev Dyspnea Rating : 2      Post Exercise Vitals  SpO2: 99 %  Supplemental O2?: No  Pulse: 83  BP: 115/59  Bev Dyspnea Rating : 3       Modality  Modality: Arm Ergometer, Hand Free Weights, Nustep, Recumbent Bike    Arm Ergometer  Time: 10 minutes (1.11 miles)  Level: 2 (2.1 mets)      Nustep  Time: 25 minutes  Steps: 2042  Load: 4  Mets: 2.7      Recumbent Bike  Time: 10 minutes (1.38 miles)  Level: 2  Mets: 2      Biceps  lbs: 4 lbs (dumbbells)  Sets: 2  Reps: 10      Chest  lbs: 4 lbs (dumbbells)  Sets: 2  Reps: 10    Pulmonary Rehab COVID19 Screening Checklist    1. Are you having any of the following physical symptoms?   Yes [] No [x]     Fever or chills  Unexplained muscle or body aches  Cough  Shortness of breath or difficulty breathing  Fatigue  Headache  New loss of taste or smell  Sore throat  Congestion or runny nose  Nausea or vomiting  Diarrhea      2.  Have you come in close contact with anyone with the above symptoms or with known COVID19 in the last 14 days? Yes [] No [x]        3.  Have you tested positive for COVID19 in the last 30 days?   Yes [] No [x]        Education  Lessons learned with COPD  Preventing lung infections      Therapist Notes   Excellent effort. Increased to level 2 on arm ergometer. Also increased to level 2 on recumbent bike. Pt tolerated all changes and exercises well. He was attentive to educational topic and participated. Will continue to motivate and educate pt in rehab.      Pereira immediately available as needed.

## 2022-09-01 ENCOUNTER — HOSPITAL ENCOUNTER (OUTPATIENT)
Dept: PULMONOLOGY | Facility: HOSPITAL | Age: 82
Discharge: HOME | End: 2022-09-01
Attending: INTERNAL MEDICINE
Payer: MEDICARE

## 2022-09-01 VITALS — BODY MASS INDEX: 27.05 KG/M2 | WEIGHT: 216.38 LBS

## 2022-09-01 PROCEDURE — G0239 OTH RESP PROC, GROUP: HCPCS

## 2022-09-01 NOTE — PROGRESS NOTES
[facility]  Pulmonary Rehab  Session Summary    SUMMARY     Session Data  Session Number: 10  Time In: 1100  Time Out: 1200  Weight: 98.2 kg (216 lb 6.4 oz)  Target Heart Rate Zone: Minimum: 85 bpm  Target Heart Rate Zone: Maximum: 113 bpm  Patient Motivation: Excellent  Patient Effort: Excellent      Pre Exercise Vitals  SpO2: 98 %  Supplemental O2?: No  Pulse: 68  BP: 125/67  Bev Dyspnea Rating : 2      During Exercise Vitals  SpO2: 98 %  Supplemental O2?: No  Pulse: 82  BP: 140/68  Bev Dyspnea Rating : 2      Post Exercise Vitals  SpO2: 96 %  Supplemental O2?: No  Pulse: 79  BP: 134/65  Bev Dyspnea Rating : 2       Modality  Modality: Arm Ergometer, Hand Free Weights, Nustep, Recumbent Bike      Arm Ergometer  Time: 10 minutes (2.1 mets)  Level: 2 (1.07 miles)      Nustep  Time: 25 minutes  Steps: 2006  Load: 4  Mets: 2.7      Recumbent Bike  Time: 10 minutes (1.44 miles)  Level: 2  Mets: 2.3      Biceps  lbs: 4 lbs (dumbbells)  Sets: 2  Reps: 10      Chest  lbs: 4 lbs (dumbbells)  Sets: 2  Reps: 10    Pulmonary Rehab COVID19 Screening Checklist    1. Are you having any of the following physical symptoms?   Yes [] No [x]     Fever or chills  Unexplained muscle or body aches  Cough  Shortness of breath or difficulty breathing  Fatigue  Headache  New loss of taste or smell  Sore throat  Congestion or runny nose  Nausea or vomiting  Diarrhea      2.  Have you come in close contact with anyone with the above symptoms or with known COVID19 in the last 14 days? Yes [] No [x]        3.  Have you tested positive for COVID19 in the last 30 days?   Yes [] No [x]        Education  Preventing lung infections  Lessons learned with COPD      Therapist Notes   Excellent effort. Pt did well but did have leg pain on recumbent bike. He was attentive during educational section. Will continue to motivate and educate pt in rehab.    Dr. Wu immediately available as needed.

## 2022-09-06 ENCOUNTER — HOSPITAL ENCOUNTER (OUTPATIENT)
Dept: PULMONOLOGY | Facility: HOSPITAL | Age: 82
Discharge: HOME | End: 2022-09-06
Attending: INTERNAL MEDICINE
Payer: MEDICARE

## 2022-09-06 VITALS — BODY MASS INDEX: 26.94 KG/M2 | WEIGHT: 215.5 LBS

## 2022-09-06 PROCEDURE — G0239 OTH RESP PROC, GROUP: HCPCS

## 2022-09-06 NOTE — PROGRESS NOTES
[facility]  Pulmonary Rehab  Session Summary    SUMMARY     Session Data  Session Number: 11  Time In: 1100  Time Out: 1200  Weight: 97.8 kg (215 lb 8 oz)  Target Heart Rate Zone: Minimum: 85 bpm  Target Heart Rate Zone: Maximum: 113 bpm  Patient Motivation: Excellent  Patient Effort: Excellent      Pre Exercise Vitals  SpO2: 98 %  Supplemental O2?: No  Pulse: 72  BP: 133/74  Bev Dyspnea Rating : 2      During Exercise Vitals  SpO2: 98 %  Supplemental O2?: No  Pulse: 87  BP: 144/74  Bev Dyspnea Rating : 2      Post Exercise Vitals  SpO2: 99 %  Supplemental O2?: No  Pulse: 89  BP: 130/63  Bev Dyspnea Rating : 3       Modality  Modality: Arm Ergometer, Hand Free Weights, Nustep, Recumbent Bike    Arm Ergometer  Time: 10 minutes (1.14 miles)  Level: 2 (2.1 mets)      Nustep  Time: 25 minutes  Steps: 2134  Load: 4  Mets: 2.8      Recumbent Bike  Time: 6.4 minutes (1 mile)  Level: 2  Mets: 2.5         Biceps  lbs: 4 lbs (dumbbells)  Sets: 2  Reps: 10      Chest  lbs: 4 lbs (dumbbells)  Sets: 2  Reps: 10    Pulmonary Rehab COVID19 Screening Checklist    1. Are you having any of the following physical symptoms?   Yes [] No [x]     Fever or chills  Unexplained muscle or body aches  Cough  Shortness of breath or difficulty breathing  Fatigue  Headache  New loss of taste or smell  Sore throat  Congestion or runny nose  Nausea or vomiting  Diarrhea      2.  Have you come in close contact with anyone with the above symptoms or with known COVID19 in the last 14 days? Yes [] No [x]        3.  Have you tested positive for COVID19 in the last 30 days?   Yes [] No [x]        Education  Compliance with rehab, O2 and CPAP      Therapist Notes   Excellent effort. Pt tolerated all exercises well. Achieved personal best on Nustep, load 4 in 25 minutes, 2134 steps. Vitals stable. Will continue to motivate and educate pt in rehab.    Dr. Pereira immediately available as needed.

## 2022-09-08 ENCOUNTER — HOSPITAL ENCOUNTER (OUTPATIENT)
Dept: PULMONOLOGY | Facility: HOSPITAL | Age: 82
Discharge: HOME | End: 2022-09-08
Attending: INTERNAL MEDICINE
Payer: MEDICARE

## 2022-09-08 PROCEDURE — G0239 OTH RESP PROC, GROUP: HCPCS

## 2022-09-09 VITALS — WEIGHT: 214.5 LBS | BODY MASS INDEX: 26.81 KG/M2

## 2022-09-09 NOTE — PROGRESS NOTES
[facility]  Pulmonary Rehab  Session Summary    SUMMARY     Session Data  Session Number: 12  Time In: 1100  Time Out: 1200  Weight: 97.3 kg (214 lb 8 oz)  Target Heart Rate Zone: Minimum: 85 bpm  Target Heart Rate Zone: Maximum: 113 bpm  Patient Motivation: Excellent  Patient Effort: Excellent      Pre Exercise Vitals  SpO2: 98 %  Supplemental O2?: No  Pulse: 74  BP: 149/79  Bev Dyspnea Rating : 2      During Exercise Vitals  SpO2: 98 %  Supplemental O2?: No  Pulse: 75  BP: 142/79  Bev Dyspnea Rating : 2      Post Exercise Vitals  SpO2: 95 %  Supplemental O2?: No  Pulse: 87  BP: 142/65  Bev Dyspnea Rating : 3       Modality  Modality: Arm Ergometer, Hand Free Weights, Nustep      Arm Ergometer  Time: 10 minutes (1.16 miles)  Level: 2 (2.3 mets)      Nustep  Time: 25 minutes  Steps: 1993  Load: 5  Mets: 3      Biceps  lbs: 5 lbs (dumbbells)  Sets: 2  Reps: 10      Chest  lbs: 5 lbs (dumbbells)  Sets: 2  Reps: 10    Pulmonary Rehab COVID19 Screening Checklist    1. Are you having any of the following physical symptoms?   Yes [] No [x]     Fever or chills  Unexplained muscle or body aches  Cough  Shortness of breath or difficulty breathing  Fatigue  Headache  New loss of taste or smell  Sore throat  Congestion or runny nose  Nausea or vomiting  Diarrhea      2.  Have you come in close contact with anyone with the above symptoms or with known COVID19 in the last 14 days? Yes [] No [x]        3.  Have you tested positive for COVID19 in the last 30 days?   Yes [] No [x]        Education  Compliance with rehab, O2 and CPAP      Therapist Notes   Excellent effort. Increased to 5 lb dumbbells for chest and bicep exercises. Also increased to load 5 on Nustep. Pt tolerated all exercises well. Vitals stable. Will continue to motivate and educate pt in rehab.    Dr. Pereira immediately available as needed.

## 2022-09-13 ENCOUNTER — HOSPITAL ENCOUNTER (OUTPATIENT)
Dept: PULMONOLOGY | Facility: HOSPITAL | Age: 82
Discharge: HOME | End: 2022-09-13
Attending: INTERNAL MEDICINE
Payer: MEDICARE

## 2022-09-13 VITALS — BODY MASS INDEX: 26.77 KG/M2 | WEIGHT: 214.19 LBS

## 2022-09-13 PROCEDURE — G0239 OTH RESP PROC, GROUP: HCPCS

## 2022-09-13 NOTE — PROGRESS NOTES
[facility]  Pulmonary Rehab  Session Summary    SUMMARY     Session Data  Session Number: 13  Time In: 1100  Time Out: 1200  Weight: 97.2 kg (214 lb 3.2 oz)  Target Heart Rate Zone: Minimum: 85 bpm  Target Heart Rate Zone: Maximum: 113 bpm  Patient Motivation: Excellent  Patient Effort: Excellent      Pre Exercise Vitals  SpO2: 98 %  Supplemental O2?: No  Pulse: 72  BP: 138/70  Bev Dyspnea Rating : 2      During Exercise Vitals  SpO2: 98 %  Supplemental O2?: No  Pulse: 77  BP: 137/70  Bev Dyspnea Rating : 3      Post Exercise Vitals  SpO2: 99 %  Supplemental O2?: No  Pulse: 87  BP: 119/66  Bev Dyspnea Rating : 3       Modality  Modality: Arm Ergometer, Hand Free Weights, Nustep, Recumbent Bike      Arm Ergometer  Time: 10 minutes (1.5 mets)  Level: 2 (1.05 miles)      Nustep  Time: 25 minutes  Steps: 1938  Load: 5  Mets: 2.8      Recumbent Bike  Time: 5 minutes (.77 miles)  Level: 2  Mets: 2.3      Biceps  lbs: 5 lbs (dumbbells)  Sets: 2  Reps: 10      Chest  lbs: 5 lbs (dumbbells)  Sets: 2  Reps: 10    Pulmonary Rehab COVID19 Screening Checklist    1. Are you having any of the following physical symptoms?   Yes [] No [x]     Fever or chills  Unexplained muscle or body aches  Cough  Shortness of breath or difficulty breathing  Fatigue  Headache  New loss of taste or smell  Sore throat  Congestion or runny nose  Nausea or vomiting  Diarrhea      2.  Have you come in close contact with anyone with the above symptoms or with known COVID19 in the last 14 days? Yes [] No [x]        3.  Have you tested positive for COVID19 in the last 30 days?   Yes [] No [x]        Education  Maximizing energy  COPD action plan      Therapist Notes   Excellent effort. Pt was only able to do 5 mins on recumbent bike due to knee pain. He tolerated other exercises well. Vitals were stable. Will continue to motivate and educate pt in rehab.    Dr. Machuca immediately available as needed.

## 2022-09-13 NOTE — PATIENT INSTRUCTIONS
Counseling and Referral of Other Preventative  (Italic type indicates deductible and co-insurance are waived)    Patient Name: Srinath Mcknight  Today's Date: 7/24/2017      SERVICE LIMITATIONS RECOMMENDATION    Vaccines    · Pneumococcal (once after 65)    · Influenza (annually)    · Hepatitis B (if medium/high risk)    · Prevnar 13      Hepatitis B medium/high risk factors:       - End-stage renal disease       - Hemophiliacs who received Factor VII or         IX concentrates       - Clients of institutions for the mentally             retarded       - Persons who live in the same house as          a HepB carrier       - Homosexual men       - Illicit injectable drug abusers     Pneumococcal: Done, no repeat necessary     Influenza: Scheduled - see appointments     Hepatitis B: N/A     Prevnar 13: Done, no repeat necessary    Prostate cancer screening (annually to age 75)     Prostate specific antigen (PSA) Shared decision making with Provider. Sometimes a co-pay may be required if the patient decides to have this test. The USPSTF no longer recommends prostate cancer screening routinely in medicine: every 1 year    Colorectal cancer screening (to age 75)    · Fecal occult blood test (annual)  · Flexible sigmoidoscopy (5y)  · Screening colonoscopy (10y)  · Barium enema   Last done 4/7/2015, recommend to repeat every 3  years    Diabetes self-management training (no USPSTF recommendations)  Requires referral by treating physician for patient with diabetes or renal disease. 10 hours of initial DSMT sessions of no less than 30 minutes each in a continuous 12-month period. 2 hours of follow-up DSMT in subsequent years.  N/A    Glaucoma screening (no USPSTF recommendation)  Diabetes mellitus, family history   , age 50 or over    American, age 65 or over  Done this year, repeat every year    Medical nutrition therapy for diabetes or renal disease (no recommended schedule)  Requires referral by  treating physician for patient with diabetes or renal disease or kidney transplant within the past 3 years.  Can be provided in same year as diabetes self-management training (DSMT), and CMS recommends medical nutrition therapy take place after DSMT. Up to 3 hours for initial year and 2 hours in subsequent years.  N/A    Cardiovascular screening blood tests (every 5 years)  · Fasting lipid panel  Order as a panel if possible  Done this year, repeat every year    Diabetes screening tests (at least every 3 years, Medicare covers annually or at 6-month intervals for prediabetic patients)  · Fasting blood sugar (FBS) or glucose tolerance test (GTT)  Patient must be diagnosed with one of the following:       - Hypertension       - Dyslipidemia       - Obesity (BMI 30kg/m2)       - Previous elevated impaired FBS or GTT       ... or any two of the following:       - Overweight (BMI 25 but <30)       - Family history of diabetes       - Age 65 or older       - History of gestational diabetes or birth of baby weighing more than 9 pounds  Done this year, repeat every year    Abdominal aortic aneurysm screening (once)  · Sonogram   Limited to patients who meet one of the following criteria:       - Men who are 65-75 years old and have smoked more than 100 cigarette in their lifetime       - Anyone with a family history of abdominal aortic aneurysm       - Anyone recommended for screening by the USPSTF  N/A    HIV screening (annually for increased risk patients)  · HIV-1 and HIV-2 by EIA, or ELKE, rapid antibody test or oral mucosa transudate  Patients must be at increased risk for HIV infection per USPSTF guidelines or pregnant. Tests covered annually for patient at increased risk or as requested by the patient. Pregnant patients may receive up to 3 tests during pregnancy.  Risks discussed, screening is not recommended    Smoking cessation counseling (up to 8 sessions per year)  Patients must be asymptomatic of  tobacco-related conditions to receive as a preventative service.  Non-smoker    Subsequent annual wellness visit  At least 12 months since last AWV  Return in one year     The following information is provided to all patients.  This information is to help you find resources for any of the problems found today that may be affecting your health:                Living healthy guide: www.[facility].[place].[facility]      Understanding Diabetes: www.diabetes.org      Eating healthy: www.cdc.gov/healthyweight      CDC home safety checklist: www.cdc.gov/steadi/patient.html      Agency on Aging: www.goea.[place].[facility]      Alcoholics anonymous (AA): www.aa.org      Physical Activity: www.michael.nih.gov/dx9gmlt      Tobacco use: www.quitwithusla.org      Quality 130: Documentation Of Current Medications In The Medical Record: Current Medications Documented Detail Level: Detailed

## 2022-09-15 ENCOUNTER — HOSPITAL ENCOUNTER (OUTPATIENT)
Dept: PULMONOLOGY | Facility: HOSPITAL | Age: 82
Discharge: HOME | End: 2022-09-15
Attending: INTERNAL MEDICINE
Payer: MEDICARE

## 2022-09-15 PROCEDURE — G0239 OTH RESP PROC, GROUP: HCPCS

## 2022-09-16 VITALS — BODY MASS INDEX: 27.1 KG/M2 | WEIGHT: 216.81 LBS

## 2022-09-16 DIAGNOSIS — E78.2 COMBINED HYPERLIPIDEMIA ASSOCIATED WITH TYPE 2 DIABETES MELLITUS: ICD-10-CM

## 2022-09-16 DIAGNOSIS — E11.69 COMBINED HYPERLIPIDEMIA ASSOCIATED WITH TYPE 2 DIABETES MELLITUS: ICD-10-CM

## 2022-09-16 NOTE — PROGRESS NOTES
[facility]  Pulmonary Rehab  Session Summary    SUMMARY     Session Data  Session Number: 14  Time In: 1100  Time Out: 1200  Weight: 98.3 kg (216 lb 12.8 oz)  Target Heart Rate Zone: Minimum: 85 bpm  Target Heart Rate Zone: Maximum: 113 bpm  Patient Motivation: Excellent  Patient Effort: Excellent      Pre Exercise Vitals  SpO2: 98 %  Supplemental O2?: No  Pulse: 66  BP: 136/71  Bev Dyspnea Rating : 2      During Exercise Vitals  SpO2: 95 %  Supplemental O2?: No  Pulse: 75  BP: 134/71  Bev Dyspnea Rating : 2      Post Exercise Vitals  SpO2: 99 %  Supplemental O2?: No  Pulse: 84  BP: 152/83  Bev Dyspnea Rating : 3       Modality  Modality: Arm Ergometer, Hand Free Weights, Nustep      Arm Ergometer  Time: 12 minutes (1.32 miles)  Level: 2 (2.1 mets)      Nustep  Time: 25 minutes  Steps: 1937  Load: 5  Mets: 3       Biceps  lbs: 5 lbs (dumbbells)  Sets: 2  Reps: 10      Chest  lbs: 5 lbs (dumbbells)  Sets: 2  Reps: 10    Pulmonary Rehab COVID19 Screening Checklist    1. Are you having any of the following physical symptoms?   Yes [] No [x]     Fever or chills  Unexplained muscle or body aches  Cough  Shortness of breath or difficulty breathing  Fatigue  Headache  New loss of taste or smell  Sore throat  Congestion or runny nose  Nausea or vomiting  Diarrhea      2.  Have you come in close contact with anyone with the above symptoms or with known COVID19 in the last 14 days? Yes [] No [x]        3.  Have you tested positive for COVID19 in the last 30 days?   Yes [] No [x]        Education  Maximizing energy  Recognizing flareups      Therapist Notes   Excellent effort. Increased to 12 mins on arm ergometer, level 2. He tolerated this and all exercises well. No recumbent bike today due to knee pain. Will continue to motivate and educate pt in rehab.    Dr. Pereira immediately available as needed.

## 2022-09-16 NOTE — TELEPHONE ENCOUNTER
Refill Routing Note   Medication(s) are not appropriate for processing by [facility] for the following reason(s):      - Required laboratory values are abnormal    [facility] action(s):  Defer Medication-related problems identified:   Requires appointment  Requires labs     Medication Therapy Plan: pt due for o/v, lipid panel, and uric acid  Medication reconciliation completed: No     Appointments  past 12m or future 3m with PCP    Date Provider   Last Visit   9/15/2021 Yeison Wilder MD   Next Visit   12/29/2022 Yeison Wilder MD   ED visits in past 90 days: 0        Note composed:4:20 PM 09/16/2022

## 2022-09-16 NOTE — TELEPHONE ENCOUNTER
Care Due:                  Date            Visit Type   Department     Provider  --------------------------------------------------------------------------------                                EP -                              PRIMARY      [facility] INTERNAL  Last Visit: 09-      CARE ([facility])   MEDICINE       Yeison Wilder                              [facility]                              PRIMARY      [facility] INTERNAL  Next Visit: 12-      [facility] ([facility])   MEDICINE       Yeison Wilder                                                            Last  Test          Frequency    Reason                     Performed    Due Date  --------------------------------------------------------------------------------    Office Visit  12 months..  JARDIANCE, allopurinoL,    09-   09-                             losartan, metFORMIN,                             metoprolol, pravastatin,                             tamsulosin...............    Lipid Panel.  12 months..  pravastatin..............  09- 09-    Uric Acid...  12 months..  allopurinoL..............  Not Found    Overdue    [employer] Embedded Care Gaps. Reference number: [id number]. 9/16/2022   12:02:34 PM CDT

## 2022-09-17 RX ORDER — EMPAGLIFLOZIN 10 MG/1
TABLET, FILM COATED ORAL
Qty: 90 TABLET | Refills: 0 | Status: SHIPPED | OUTPATIENT
Start: 2022-09-17 | End: 2022-11-14

## 2022-09-19 ENCOUNTER — TELEPHONE (OUTPATIENT)
Dept: INTERNAL MEDICINE | Facility: CLINIC | Age: 82
End: 2022-09-19
Payer: MEDICARE

## 2022-09-19 NOTE — TELEPHONE ENCOUNTER
----- Message from Carley Nicholas sent at 9/19/2022  7:53 AM CDT -----  Contact: [phone]  Patient is returning a phone call.  Who left a message for the patient: ????  Does patient know what this is regarding:    Would you like a call back, or a response through your [facility] portal?:  call back  Comments:

## 2022-09-19 NOTE — TELEPHONE ENCOUNTER
Spoke with patient, and informed him that  refilled his medication but states he is overdue for a follow up. Scheduled patient with Jose Roger NP on tomorrow.

## 2022-09-20 ENCOUNTER — OFFICE VISIT (OUTPATIENT)
Dept: INTERNAL MEDICINE | Facility: CLINIC | Age: 82
End: 2022-09-20
Payer: MEDICARE

## 2022-09-20 ENCOUNTER — HOSPITAL ENCOUNTER (OUTPATIENT)
Dept: PULMONOLOGY | Facility: HOSPITAL | Age: 82
Discharge: HOME | End: 2022-09-20
Attending: INTERNAL MEDICINE
Payer: MEDICARE

## 2022-09-20 VITALS
HEIGHT: 75 IN | DIASTOLIC BLOOD PRESSURE: 78 MMHG | OXYGEN SATURATION: 97 % | SYSTOLIC BLOOD PRESSURE: 130 MMHG | TEMPERATURE: 97 F | BODY MASS INDEX: 26.76 KG/M2 | HEART RATE: 79 BPM | WEIGHT: 215.19 LBS

## 2022-09-20 DIAGNOSIS — L25.9 CONTACT DERMATITIS, UNSPECIFIED CONTACT DERMATITIS TYPE, UNSPECIFIED TRIGGER: ICD-10-CM

## 2022-09-20 DIAGNOSIS — I15.2 HYPERTENSION ASSOCIATED WITH DIABETES: Primary | ICD-10-CM

## 2022-09-20 DIAGNOSIS — E11.59 HYPERTENSION ASSOCIATED WITH DIABETES: Primary | ICD-10-CM

## 2022-09-20 DIAGNOSIS — E11.51 TYPE 2 DIABETES MELLITUS WITH DIABETIC PERIPHERAL ANGIOPATHY WITHOUT GANGRENE, WITHOUT LONG-TERM CURRENT USE OF INSULIN: ICD-10-CM

## 2022-09-20 PROCEDURE — 99214 PR OFFICE/OUTPT VISIT, EST, LEVL IV, 30-39 MIN: ICD-10-PCS | Mod: S$GLB,,, | Performed by: NURSE PRACTITIONER

## 2022-09-20 PROCEDURE — 1159F MED LIST DOCD IN RCRD: CPT | Mod: CPTII,S$GLB,, | Performed by: NURSE PRACTITIONER

## 2022-09-20 PROCEDURE — 3078F DIAST BP <80 MM HG: CPT | Mod: CPTII,S$GLB,, | Performed by: NURSE PRACTITIONER

## 2022-09-20 PROCEDURE — 3072F PR LOW RISK FOR RETINOPATHY: ICD-10-PCS | Mod: CPTII,S$GLB,, | Performed by: NURSE PRACTITIONER

## 2022-09-20 PROCEDURE — G0239 OTH RESP PROC, GROUP: HCPCS

## 2022-09-20 PROCEDURE — 3075F PR MOST RECENT SYSTOLIC BLOOD PRESS GE 130-139MM HG: ICD-10-PCS | Mod: CPTII,S$GLB,, | Performed by: NURSE PRACTITIONER

## 2022-09-20 PROCEDURE — 1159F PR MEDICATION LIST DOCUMENTED IN MEDICAL RECORD: ICD-10-PCS | Mod: CPTII,S$GLB,, | Performed by: NURSE PRACTITIONER

## 2022-09-20 PROCEDURE — 3072F LOW RISK FOR RETINOPATHY: CPT | Mod: CPTII,S$GLB,, | Performed by: NURSE PRACTITIONER

## 2022-09-20 PROCEDURE — 1160F RVW MEDS BY RX/DR IN RCRD: CPT | Mod: CPTII,S$GLB,, | Performed by: NURSE PRACTITIONER

## 2022-09-20 PROCEDURE — 99999 PR PBB SHADOW E&M-EST. PATIENT-LVL III: CPT | Mod: PBBFAC,,, | Performed by: NURSE PRACTITIONER

## 2022-09-20 PROCEDURE — 99214 OFFICE O/P EST MOD 30 MIN: CPT | Mod: S$GLB,,, | Performed by: NURSE PRACTITIONER

## 2022-09-20 PROCEDURE — 99999 PR PBB SHADOW E&M-EST. PATIENT-LVL III: ICD-10-PCS | Mod: PBBFAC,,, | Performed by: NURSE PRACTITIONER

## 2022-09-20 PROCEDURE — 3075F SYST BP GE 130 - 139MM HG: CPT | Mod: CPTII,S$GLB,, | Performed by: NURSE PRACTITIONER

## 2022-09-20 PROCEDURE — 1160F PR REVIEW ALL MEDS BY PRESCRIBER/CLIN PHARMACIST DOCUMENTED: ICD-10-PCS | Mod: CPTII,S$GLB,, | Performed by: NURSE PRACTITIONER

## 2022-09-20 PROCEDURE — 3078F PR MOST RECENT DIASTOLIC BLOOD PRESSURE < 80 MM HG: ICD-10-PCS | Mod: CPTII,S$GLB,, | Performed by: NURSE PRACTITIONER

## 2022-09-20 RX ORDER — PREDNISOLONE ACETATE 10 MG/ML
SUSPENSION/ DROPS OPHTHALMIC
COMMUNITY
Start: 2022-09-16 | End: 2023-12-18

## 2022-09-20 RX ORDER — OXYCODONE AND ACETAMINOPHEN 5; 325 MG/1; MG/1
1 TABLET ORAL EVERY 8 HOURS PRN
COMMUNITY
Start: 2022-09-14 | End: 2023-02-16

## 2022-09-20 RX ORDER — CETIRIZINE HYDROCHLORIDE 10 MG/1
10 TABLET ORAL DAILY
Qty: 30 TABLET | Refills: 1 | Status: SHIPPED | OUTPATIENT
Start: 2022-09-20 | End: 2022-11-03

## 2022-09-20 NOTE — PROGRESS NOTES
"Subjective:       Patient ID: Srinath Mcknight is a 81 y.o. male.    Chief Complaint: Follow-up    Pt presents to clinic for a follow up on his HTN and his diabetes  Pt taking Jardiance and Metformin but only taking the metformin once/day  Reports his sugars were running too low when he was taking metformin bid   Reports cost of jardiance is too high due to him being in the gap- he is requesting to be off this med   Went to [facility] for his diabetic eye exam    BP has been running low at home as well  100/50s per report, was taking 100 mg losartan  Reports he is now only taking losartan 50 mg and bps haven been back to normal range    Having some problems with itching to bilateral arms/shoulders/back  No rashes  No changes in detergents  Tried some otc creams without relief             /78   Pulse 79   Temp 97.1 °F (36.2 °C) (Temporal)   Ht 6' 3" (1.905 m)   Wt 97.6 kg (215 lb 2.7 oz)   SpO2 97%   BMI 26.89 kg/m²     Review of Systems   Constitutional:  Negative for activity change, appetite change, chills, diaphoresis, fatigue, fever and unexpected weight change.   HENT: Negative.     Eyes: Negative.    Respiratory:  Negative for apnea, chest tightness, shortness of breath and stridor.    Cardiovascular:  Negative for chest pain, palpitations and leg swelling.   Gastrointestinal: Negative.    Endocrine: Negative.    Genitourinary: Negative.    Musculoskeletal:  Negative for arthralgias and myalgias.   Skin:  Negative for color change, pallor, rash and wound.        itching   Allergic/Immunologic: Negative.    Neurological:  Negative for dizziness, facial asymmetry, light-headedness and headaches.   Hematological:  Negative for adenopathy.   Psychiatric/Behavioral:  Negative for agitation and behavioral problems.      Objective:      Physical Exam  Vitals and nursing note reviewed.   Constitutional:       General: He is not in acute distress.     Appearance: He is well-developed. He is not " diaphoretic.   HENT:      Head: Normocephalic and atraumatic.   Cardiovascular:      Rate and Rhythm: Normal rate and regular rhythm.      Heart sounds: Normal heart sounds.   Pulmonary:      Effort: Pulmonary effort is normal. No respiratory distress.      Breath sounds: Normal breath sounds.   Skin:     General: Skin is warm and dry.      Findings: No rash.   Neurological:      Mental Status: He is alert and oriented to person, place, and time.   Psychiatric:         Behavior: Behavior normal.         Thought Content: Thought content normal.         Judgment: Judgment normal.       Assessment:       1. Hypertension associated with diabetes    2. Type 2 diabetes mellitus with diabetic peripheral angiopathy without gangrene, without long-term current use of insulin    3. Contact dermatitis, unspecified contact dermatitis type, unspecified trigger    4. BMI 26.0-26.9,adult          Plan:       [place] was seen today for follow-up.    Diagnoses and all orders for this visit:    Hypertension associated with diabetes        - Chronic, stable on losartan 50 mg continue   Type 2 diabetes mellitus with diabetic peripheral angiopathy without gangrene, without long-term current use of insulin        - Chronic, stable- pt unable to afford jardiance. Will finish his month supply of jardiance and then go back to taking metformin bid. He is advised to follow blood glucoses and if he sees his bg increase >140, he needs to follow up for med adjustments. Discussed cheaper alternatives but pt wishes to give metformin a try.   Contact dermatitis, unspecified contact dermatitis type, unspecified trigger  -     cetirizine (ZYRTEC) 10 MG tablet; Take 1 tablet (10 mg total) by mouth once daily.   - Scent free detergents/soaps. Keep skin moisturized     BMI 26.0-26.9,adult    Follow up for worsening or no improvement in symptoms and PRN.    3 month with Dr. Wilder

## 2022-09-21 VITALS — WEIGHT: 215.13 LBS | BODY MASS INDEX: 26.89 KG/M2

## 2022-09-21 NOTE — PROGRESS NOTES
[facility]  Pulmonary Rehab  Session Summary    SUMMARY     Session Data  Session Number: 15  Time In: 1100  Time Out: 1200  Weight: 97.6 kg (215 lb 1.6 oz)  Target Heart Rate Zone: Minimum: 85 bpm  Target Heart Rate Zone: Maximum: 113 bpm  Patient Motivation: Excellent  Patient Effort: Excellent      Pre Exercise Vitals  SpO2: 98 %  Supplemental O2?: No  Pulse: 72  BP: 151/73  Bev Dyspnea Rating : 2      During Exercise Vitals  SpO2: 98 %  Supplemental O2?: No  Pulse: 86  BP: 135/74  Bev Dyspnea Rating : 3      Post Exercise Vitals  SpO2: 95 %  Supplemental O2?: No  Pulse: 90  BP: 123/58  Bev Dyspnea Rating : 2       Modality  Modality: Arm Ergometer, Hand Free Weights, Nustep, Recumbent Bike      Arm Ergometer  Time: 12 minutes (2.4 mets)  Level: 2 (1.43 miles)      Nustep  Time: 20 minutes  Steps: 1642  Load: 5  Mets: 3.1      Recumbent Bike  Time: 5 minutes (1.39 miles)  Level: 2  Mets: 2.6      Biceps  lbs: 5 lbs (dumbbells)  Sets: 2  Reps: 10      Chest  lbs: 5 lbs (dumbbells)  Sets: 2  Reps: 10    Pulmonary Rehab COVID19 Screening Checklist    1. Are you having any of the following physical symptoms?   Yes [] No [x]     Fever or chills  Unexplained muscle or body aches  Cough  Shortness of breath or difficulty breathing  Fatigue  Headache  New loss of taste or smell  Sore throat  Congestion or runny nose  Nausea or vomiting  Diarrhea      2.  Have you come in close contact with anyone with the above symptoms or with known COVID19 in the last 14 days? Yes [] No [x]        3.  Have you tested positive for COVID19 in the last 30 days?   Yes [] No [x]         Education  Proper nutrition and breathing      Therapist Notes   Excellent effort. Only completed 5 mins on recumbent bike. Had to stop due to knee pain. Otherwise he tolerated exercises well. Vitals were stable. Will continue to motivate and educate pt in rehab.    Dr. Machuca immediately available as needed.

## 2022-09-22 ENCOUNTER — HOSPITAL ENCOUNTER (OUTPATIENT)
Dept: PULMONOLOGY | Facility: HOSPITAL | Age: 82
Discharge: HOME | End: 2022-09-22
Attending: INTERNAL MEDICINE
Payer: MEDICARE

## 2022-09-22 VITALS — BODY MASS INDEX: 26.96 KG/M2 | WEIGHT: 215.69 LBS

## 2022-09-22 PROCEDURE — G0239 OTH RESP PROC, GROUP: HCPCS

## 2022-09-22 NOTE — PROGRESS NOTES
[facility]  Pulmonary Rehab  Session Summary    SUMMARY     Session Data  Session Number: 16  Time In: 1100  Time Out: 1200  Weight: 97.8 kg (215 lb 11.2 oz)  Target Heart Rate Zone: Minimum: 85 bpm  Target Heart Rate Zone: Maximum: 113 bpm  Patient Motivation: Excellent  Patient Effort: Excellent      Pre Exercise Vitals  SpO2: 97 %  Supplemental O2?: No  Pulse: 77  BP: 109/56  Bev Dyspnea Rating : 3      During Exercise Vitals  SpO2: 97 %  Supplemental O2?: No  Pulse: 85  BP: 133/73  Bev Dyspnea Rating : 2      Post Exercise Vitals  SpO2: 98 %  Supplemental O2?: No  Pulse: 89  BP: 127/63  Bev Dyspnea Rating : 2       Modality  Modality: Arm Ergometer, Hand Free Weights, Nustep, Recumbent Bike      Arm Ergometer  Time: 12 minutes (1.33 miles)  Level: 2 (2.2 mets)    Nustep  Time: 20 minutes  Steps: 1500  Load: 6  Mets: 3      Recumbent Bike  Time: 10 minutes (1.49 miles)  Level: 2  Mets: 2.3         Biceps  lbs: 5 lbs (dumbbells)  Sets: 2  Reps: 10      Chest  lbs: 5 lbs (dumbbells)  Sets: 2  Reps: 10    Pulmonary Rehab COVID19 Screening Checklist    1. Are you having any of the following physical symptoms?   Yes [] No [x]     Fever or chills  Unexplained muscle or body aches  Cough  Shortness of breath or difficulty breathing  Fatigue  Headache  New loss of taste or smell  Sore throat  Congestion or runny nose  Nausea or vomiting  Diarrhea      2.  Have you come in close contact with anyone with the above symptoms or with known COVID19 in the last 14 days? Yes [] No [x]        3.  Have you tested positive for COVID19 in the last 30 days?   Yes [] No [x]        Education  Proper nutrition and breathing      Therapist Notes   Excellent effort. Pt was able to exercises full 10 mins on level 2 on recumbent bike. Increased to load 6 on Nustep for 25 mins. Pt tolerated all changes and exercises well. Will continue to motivate and educate pt in rehab.    Dr. Ferrell immediately available as needed.

## 2022-09-23 NOTE — PROGRESS NOTES
[facility]  Pulmonary Rehab  30 Day Evaluation and Recertification    SUMMARY       Summary of Progress: Srinath Mcknight has been doing excellent in rehab. He is increasing his exercise tolerance and will continue to benefit from pulmonary rehab. Pt works hard in his sessions and is dedicated to rehab, attending regularly. Pt is making significant progress, but does have knee pain, which limits his use of treadmill and recumbent bike. Pt strives to reach his goals and works through his pain, although encouraged not to do so at times. He states he exercises at home using weights and stationary bike. Will continue to motivate and educate pt while striving to increase his strength and endurance.       Attends:     Patient attends 2 days a week and has completed 15 visits.  The patient's current compliance is 100%.    Referring provider : Dr. Machuca    Start date:  7/29/22    Problems this Certification Period:   Knee pain    Exercise Capacity Summary    Nustep Date:  8/23/22   Time Load Steps Date:  9/15/22 Time Load Steps     20 4 1508  25 4 1937   Recum-  Bent  Bike Date:  8/23/22  (1.2 miles)   Time Level Date:  9/1/22  (1.44 miles) Time Level     10 1  10 2   Treadmill Date:  8/16/22   Time Speed Grade Date:   Time Speed Grade     3 1.2 0       Arm Ergomet-er Date:  8/23/22  (0.93 miles)   Time Level Date:  9/20/22  (1.43 miles) Time Level     10 1.5  12 2   Free Weights Date:  8/23/22  (Dumb)   Bicep Curls  Chest Press  Triceps  Legs lbs Sets Reps Date:  9/20/22  (Dumb) Bicep Curls  Chest Press  Triceps  Legs lbs Sets Reps      4 2 10   5 2 10                                                                                 Education:  Compliance in rehab, with O2 and CPAP  COPD action plan  Pursed lip breathing  Nutrition and breathing  Medication mgmt    Goals:  met    Updated Exercise Prescription:  Endurance Training: Arm ergometer, 14 mins, level 2  Strength Training: Nustep, load 5, 1000 steps  in 20 mins         I certify that the patient is making progress in pulmonary rehabilitation, is physically able to participate, medically stable and remains motivated.

## 2022-09-27 ENCOUNTER — HOSPITAL ENCOUNTER (OUTPATIENT)
Dept: PULMONOLOGY | Facility: HOSPITAL | Age: 82
Discharge: HOME | End: 2022-09-27
Attending: INTERNAL MEDICINE
Payer: MEDICARE

## 2022-09-27 VITALS — WEIGHT: 218.38 LBS | BODY MASS INDEX: 27.3 KG/M2

## 2022-09-27 PROCEDURE — G0239 OTH RESP PROC, GROUP: HCPCS

## 2022-09-27 NOTE — PROGRESS NOTES
[facility]  Pulmonary Rehab  Session Summary    SUMMARY     Session Data  Session Number: 17  Time In: 1100  Time Out: 1200  Weight: 99.1 kg (218 lb 6.4 oz)  Target Heart Rate Zone: Minimum: 85 bpm  Target Heart Rate Zone: Maximum: 113 bpm  Patient Motivation: Excellent  Patient Effort: Excellent      Pre Exercise Vitals  SpO2: 94 %  Supplemental O2?: No  Pulse: 76  BP: 141/75  Bev Dyspnea Rating : 2      During Exercise Vitals  SpO2: 99 %  Supplemental O2?: No  Pulse: 85  BP: 123/69  Bev Dyspnea Rating : 3      Post Exercise Vitals  SpO2: 99 %  Supplemental O2?: No  Pulse: 88  BP: 146/70  Bev Dyspnea Rating : 3       Modality  Modality: Arm Ergometer, Hand Free Weights, Nustep, Recumbent Bike      Arm Ergometer  Time: 14 minutes (1.66 miles)  Level: 2 (2.4 mets)      Nustep  Time: 20 minutes  Steps: 1630  Load: 6  Mets: 3      Recumbent Bike  Time: 5 minutes (.81 miles)  Level: 1  Mets: 2.4       Biceps  lbs: 5 lbs (dumbbells)  Sets: 2  Reps: 10      Chest  lbs: 5 lbs (dumbbells)  Sets: 2  Reps: 10    Pulmonary Rehab COVID19 Screening Checklist    1. Are you having any of the following physical symptoms?   Yes [] No [x]     Fever or chills  Unexplained muscle or body aches  Cough  Shortness of breath or difficulty breathing  Fatigue  Headache  New loss of taste or smell  Sore throat  Congestion or runny nose  Nausea or vomiting  Diarrhea      2.  Have you come in close contact with anyone with the above symptoms or with known COVID19 in the last 14 days? Yes [] No [x]        3.  Have you tested positive for COVID19 in the last 30 days?   Yes [] No [x]        Education  Cycle of inactivity  Stress and SOB      Therapist Notes   Excellent effort. Increased to 14 mins on arm ergometer, level 2. Pt met 30 day endurance goal by doing this. He tolerated this change and all exercises well. Will continue to motivate and educate pt in rehab.    Dr. Pereira immediately available as needed.            Hypertension

## 2022-09-29 ENCOUNTER — HOSPITAL ENCOUNTER (OUTPATIENT)
Dept: PULMONOLOGY | Facility: HOSPITAL | Age: 82
Discharge: HOME | End: 2022-09-29
Attending: INTERNAL MEDICINE
Payer: MEDICARE

## 2022-09-29 VITALS — BODY MASS INDEX: 27.47 KG/M2 | WEIGHT: 219.81 LBS

## 2022-09-29 PROCEDURE — G0239 OTH RESP PROC, GROUP: HCPCS

## 2022-09-29 NOTE — PROGRESS NOTES
[facility]  Pulmonary Rehab  Session Summary    SUMMARY     Session Data  Session Number: 18  Time In: 1100  Time Out: 1200  Weight: 99.7 kg (219 lb 12.8 oz)  Target Heart Rate Zone: Minimum: 85 bpm  Target Heart Rate Zone: Maximum: 113 bpm  Patient Motivation: Excellent  Patient Effort: Excellent      Pre Exercise Vitals  SpO2: 98 %  Supplemental O2?: No  Pulse: 82  BP: 120/70  Bev Dyspnea Rating : 1      During Exercise Vitals  SpO2: 98 %  Supplemental O2?: No  Pulse: 84  BP: 119/63  Bev Dyspnea Rating : 2      Post Exercise Vitals  SpO2: 96 %  Supplemental O2?: No  Pulse: 89  BP: 122/62  Bev Dyspnea Rating : 2       Modality  Modality: Arm Ergometer, Hand Free Weights, Nustep, Recumbent Bike      Arm Ergometer  Time: 14 minutes (1.6 miles)  Level: 2 (2.1 mets)      Nustep  Time: 20 minutes  Steps: 1434  Load: 6  Mets: 3.1      Recumbent Bike  Time: 6 minutes (.87 miles)  Level: 2  Mets: 2.3      Biceps  lbs: 5 lbs (dumbbells)  Sets: 2  Reps: 10      Chest  lbs: 5 lbs (dumbbells)  Sets: 2  Reps: 10    Education  Cycle of inactivity  Stress and SOB    Pulmonary Rehab COVID19 Screening Checklist    1. Are you having any of the following physical symptoms?   Yes [] No [x]     Fever or chills  Unexplained muscle or body aches  Cough  Shortness of breath or difficulty breathing  Fatigue  Headache  New loss of taste or smell  Sore throat  Congestion or runny nose  Nausea or vomiting  Diarrhea      2.  Have you come in close contact with anyone with the above symptoms or with known COVID19 in the last 14 days? Yes [] No [x]        3.  Have you tested positive for COVID19 in the last 30 days?   Yes [] No [x]        Therapist Notes   Excellent effort. Pt tolerated all exercises well. He completed 6 mins on recumbent bike today, stating his knee feels a bit better. Pt works hard in his sessions. Will continue to motivate and educate pt in rehab.    Dr. Wu immediately available as needed.

## 2022-10-04 ENCOUNTER — HOSPITAL ENCOUNTER (OUTPATIENT)
Dept: PULMONOLOGY | Facility: HOSPITAL | Age: 82
Discharge: HOME | End: 2022-10-04
Attending: INTERNAL MEDICINE
Payer: MEDICARE

## 2022-10-04 PROCEDURE — G0239 OTH RESP PROC, GROUP: HCPCS

## 2022-10-05 VITALS — BODY MASS INDEX: 27.27 KG/M2 | WEIGHT: 218.19 LBS

## 2022-10-05 NOTE — PROGRESS NOTES
[facility]  Pulmonary Rehab  Session Summary    SUMMARY     Session Data  Session Number: 19  Time In: 1100  Time Out: 1200  Weight: 99 kg (218 lb 3.2 oz)  Target Heart Rate Zone: Minimum: 85 bpm  Target Heart Rate Zone: Maximum: 113 bpm  Patient Motivation: Excellent  Patient Effort: Excellent      Pre Exercise Vitals  SpO2: 98 %  Supplemental O2?: No  Pulse: 74  BP: 128/65  Bev Dyspnea Rating : 2      During Exercise Vitals  SpO2: 98 %  Supplemental O2?: No  Pulse: 87  BP: 140/68  Bev Dyspnea Rating : 2      Post Exercise Vitals  SpO2: 100 %  Supplemental O2?: No  Pulse: 87  BP: 128/69  Bev Dyspnea Rating : 2       Modality  Modality: Arm Ergometer, Hand Free Weights, Nustep      Arm Ergometer  Time: 14 minutes (1.68 miles)  Level: 2 (2 mets)      Nustep  Time: 15 minutes  Steps: 1088  Load: 6  Mets: 3.1      Biceps  lbs: 5 lbs (dumbbells)  Sets: 2  Reps: 10      Chest  lbs: 5 lbs (dumbbells)  Sets: 2  Reps: 10    Pulmonary Rehab COVID19 Screening Checklist    1. Are you having any of the following physical symptoms?   Yes [] No [x]     Fever or chills  Unexplained muscle or body aches  Cough  Shortness of breath or difficulty breathing  Fatigue  Headache  New loss of taste or smell  Sore throat  Congestion or runny nose  Nausea or vomiting  Diarrhea      2.  Have you come in close contact with anyone with the above symptoms or with known COVID19 in the last 14 days? Yes [] No [x]        3.  Have you tested positive for COVID19 in the last 30 days?   Yes [] No [x]        Education  Pulm knowledge test review      Therapist Notes   Excellent effort. Pt's knee was hurting today, so no recumbent bike today. He tolerated other exercises well. Vitals were stable. Will continue to motivate and educate pt in rehab.    Dr. Pereira  immediately available as needed.

## 2022-10-07 ENCOUNTER — LAB VISIT (OUTPATIENT)
Dept: LAB | Facility: HOSPITAL | Age: 82
End: 2022-10-07
Attending: STUDENT IN AN ORGANIZED HEALTH CARE EDUCATION/TRAINING PROGRAM
Payer: MEDICARE

## 2022-10-07 DIAGNOSIS — I70.0 AORTIC CALCIFICATION: ICD-10-CM

## 2022-10-07 LAB
CHOLEST SERPL-MCNC: 128 MG/DL (ref 120–199)
CHOLEST/HDLC SERPL: 4.1 {RATIO} (ref 2–5)
HDLC SERPL-MCNC: 31 MG/DL (ref 40–75)
HDLC SERPL: 24.2 % (ref 20–50)
LDLC SERPL CALC-MCNC: 66.4 MG/DL (ref 63–159)
NONHDLC SERPL-MCNC: 97 MG/DL
TRIGL SERPL-MCNC: 153 MG/DL (ref 30–150)

## 2022-10-07 PROCEDURE — 80061 LIPID PANEL: CPT | Performed by: STUDENT IN AN ORGANIZED HEALTH CARE EDUCATION/TRAINING PROGRAM

## 2022-10-07 PROCEDURE — 36415 COLL VENOUS BLD VENIPUNCTURE: CPT | Mod: PO | Performed by: STUDENT IN AN ORGANIZED HEALTH CARE EDUCATION/TRAINING PROGRAM

## 2022-10-14 ENCOUNTER — OFFICE VISIT (OUTPATIENT)
Dept: CARDIOLOGY | Facility: CLINIC | Age: 82
End: 2022-10-14
Payer: MEDICARE

## 2022-10-14 VITALS
BODY MASS INDEX: 27.11 KG/M2 | SYSTOLIC BLOOD PRESSURE: 128 MMHG | OXYGEN SATURATION: 98 % | WEIGHT: 218.06 LBS | HEART RATE: 64 BPM | HEIGHT: 75 IN | DIASTOLIC BLOOD PRESSURE: 70 MMHG

## 2022-10-14 DIAGNOSIS — I15.2 HYPERTENSION ASSOCIATED WITH DIABETES: ICD-10-CM

## 2022-10-14 DIAGNOSIS — R06.09 DYSPNEA ON EXERTION: ICD-10-CM

## 2022-10-14 DIAGNOSIS — M47.26 OSTEOARTHRITIS OF SPINE WITH RADICULOPATHY, LUMBAR REGION: ICD-10-CM

## 2022-10-14 DIAGNOSIS — E11.69 COMBINED HYPERLIPIDEMIA ASSOCIATED WITH TYPE 2 DIABETES MELLITUS: ICD-10-CM

## 2022-10-14 DIAGNOSIS — I70.0 AORTIC CALCIFICATION: Primary | ICD-10-CM

## 2022-10-14 DIAGNOSIS — E78.2 COMBINED HYPERLIPIDEMIA ASSOCIATED WITH TYPE 2 DIABETES MELLITUS: ICD-10-CM

## 2022-10-14 DIAGNOSIS — I70.0 ATHEROSCLEROSIS OF AORTA: ICD-10-CM

## 2022-10-14 DIAGNOSIS — E11.51 TYPE 2 DIABETES MELLITUS WITH DIABETIC PERIPHERAL ANGIOPATHY WITHOUT GANGRENE, WITHOUT LONG-TERM CURRENT USE OF INSULIN: ICD-10-CM

## 2022-10-14 DIAGNOSIS — G47.33 OSA (OBSTRUCTIVE SLEEP APNEA): ICD-10-CM

## 2022-10-14 DIAGNOSIS — M47.816 LUMBAR SPONDYLOSIS: ICD-10-CM

## 2022-10-14 DIAGNOSIS — E11.59 HYPERTENSION ASSOCIATED WITH DIABETES: ICD-10-CM

## 2022-10-14 DIAGNOSIS — E11.42 TYPE 2 DIABETES MELLITUS WITH DIABETIC POLYNEUROPATHY, WITHOUT LONG-TERM CURRENT USE OF INSULIN: ICD-10-CM

## 2022-10-14 PROCEDURE — 3078F PR MOST RECENT DIASTOLIC BLOOD PRESSURE < 80 MM HG: ICD-10-PCS | Mod: CPTII,S$GLB,, | Performed by: STUDENT IN AN ORGANIZED HEALTH CARE EDUCATION/TRAINING PROGRAM

## 2022-10-14 PROCEDURE — 99214 PR OFFICE/OUTPT VISIT, EST, LEVL IV, 30-39 MIN: ICD-10-PCS | Mod: S$GLB,,, | Performed by: STUDENT IN AN ORGANIZED HEALTH CARE EDUCATION/TRAINING PROGRAM

## 2022-10-14 PROCEDURE — 3074F SYST BP LT 130 MM HG: CPT | Mod: CPTII,S$GLB,, | Performed by: STUDENT IN AN ORGANIZED HEALTH CARE EDUCATION/TRAINING PROGRAM

## 2022-10-14 PROCEDURE — 3072F PR LOW RISK FOR RETINOPATHY: ICD-10-PCS | Mod: CPTII,S$GLB,, | Performed by: STUDENT IN AN ORGANIZED HEALTH CARE EDUCATION/TRAINING PROGRAM

## 2022-10-14 PROCEDURE — 1159F PR MEDICATION LIST DOCUMENTED IN MEDICAL RECORD: ICD-10-PCS | Mod: CPTII,S$GLB,, | Performed by: STUDENT IN AN ORGANIZED HEALTH CARE EDUCATION/TRAINING PROGRAM

## 2022-10-14 PROCEDURE — 3072F LOW RISK FOR RETINOPATHY: CPT | Mod: CPTII,S$GLB,, | Performed by: STUDENT IN AN ORGANIZED HEALTH CARE EDUCATION/TRAINING PROGRAM

## 2022-10-14 PROCEDURE — 1159F MED LIST DOCD IN RCRD: CPT | Mod: CPTII,S$GLB,, | Performed by: STUDENT IN AN ORGANIZED HEALTH CARE EDUCATION/TRAINING PROGRAM

## 2022-10-14 PROCEDURE — 99999 PR PBB SHADOW E&M-EST. PATIENT-LVL III: ICD-10-PCS | Mod: PBBFAC,,, | Performed by: STUDENT IN AN ORGANIZED HEALTH CARE EDUCATION/TRAINING PROGRAM

## 2022-10-14 PROCEDURE — 3078F DIAST BP <80 MM HG: CPT | Mod: CPTII,S$GLB,, | Performed by: STUDENT IN AN ORGANIZED HEALTH CARE EDUCATION/TRAINING PROGRAM

## 2022-10-14 PROCEDURE — 3074F PR MOST RECENT SYSTOLIC BLOOD PRESSURE < 130 MM HG: ICD-10-PCS | Mod: CPTII,S$GLB,, | Performed by: STUDENT IN AN ORGANIZED HEALTH CARE EDUCATION/TRAINING PROGRAM

## 2022-10-14 PROCEDURE — 1125F AMNT PAIN NOTED PAIN PRSNT: CPT | Mod: CPTII,S$GLB,, | Performed by: STUDENT IN AN ORGANIZED HEALTH CARE EDUCATION/TRAINING PROGRAM

## 2022-10-14 PROCEDURE — 99999 PR PBB SHADOW E&M-EST. PATIENT-LVL III: CPT | Mod: PBBFAC,,, | Performed by: STUDENT IN AN ORGANIZED HEALTH CARE EDUCATION/TRAINING PROGRAM

## 2022-10-14 PROCEDURE — 1125F PR PAIN SEVERITY QUANTIFIED, PAIN PRESENT: ICD-10-PCS | Mod: CPTII,S$GLB,, | Performed by: STUDENT IN AN ORGANIZED HEALTH CARE EDUCATION/TRAINING PROGRAM

## 2022-10-14 PROCEDURE — 99214 OFFICE O/P EST MOD 30 MIN: CPT | Mod: S$GLB,,, | Performed by: STUDENT IN AN ORGANIZED HEALTH CARE EDUCATION/TRAINING PROGRAM

## 2022-10-14 NOTE — PROGRESS NOTES
Section of Cardiology                  Cardiac Clinic Note    Chief Complaint/Reason for consultation:  Decreased exercise tolerance      HPI:   Srinath Mcknight is a 81 y.o. male with h/o hypertension, diabetes, DJD, HLD, CAD, CKD who is here for follow-up.    11/18/20  Seen by Dr. Cobos.  Patient complained of 2-4 weeks of increased fatigue and dyspnea on exertion with moderate exertion.  Denies dyspnea at rest, orthopnea, PND, chest pain, palpitations, lower extremity edema.  For possible diastolic dysfunction, his losartan was increased to 50 mg b.i.d. and started on empagliflozin (jardiance) 10 mg daily.    9/15/21  He presents today c/o DAMON. Started about 6-7 months ago. Has been fairly stable. Says he can walk to the mailbox without SOB, but becomes SOB when he walks back. Says he can't exercise due to pain and SOB. Reports intermittent cough at times with some sputum production. Mild LE swelling. Sleeps on 1 pillow and denies orthopnea, PND. Reports occasional dizziness when he stands from a seated position.     He ambulates with a cane due to multiple surgeries on his back and hip. He is able to perform all of his ADLs. Denies syncope, palpitations, chest pain, fever, vomiting, blurry vision. Denies tobacco use, ETOH use, illicit drug use.       10/12/21  He reports feeling well. He is still getting short of breath with exertion. Does not get SOB at rest.  Denies chest pain, syncope, palpitations, PND, orthopnea. He has never smoked but has been around others who have smoked. He has been trying to exercise but gets short-winded within 5 minutes and would have to stop.         4/14/22  Still getting SOB. Has to stop to catch his breath. Able to walk a little further than before  Denies syncope, but feels that he has balancing issues. Denies tinnitus. Occurs when getting up from seated position    Does not want to wear the CPAP at night  Sees pulmonary, did not qualify for pulmonary rehab      Denies chest pain, syncope, orthopnea, LE swelling, palpitations, chest pain, fever, vomiting.         10/14/22  DAMON is improving  Reports low BP, decreased losartan to daily  Getting injections in knee and back   Doing well   Ambulates with cane, right leg weakness     Denies chest pain, syncope, orthopnea, LE swelling, palpitations        EKG 4/14/22 NSR, T-wave abnormality, consider inferolateral ischemia, 74 bpm,   EKG 8/26/21  NSR, T-wave abnormality, consider inferolateral ischemia  EKG 9/15/21 NSR, T-wave abnormality, inferolateral ischemia, 78 bpm,  ms, QTc 401 ms     ECHO 10/12/21  The left ventricle is normal in size with concentric remodeling and normal systolic function.  Normal left ventricular diastolic function.  Normal right ventricular size with normal right ventricular systolic function.  The estimated ejection fraction is 55%.  Mild aortic regurgitation.       ECHO  2014  CONCLUSIONS     1 - Normal left ventricular systolic function (EF 55-60%).     2 - Normal left ventricular diastolic function.     3 - Normal right ventricular systolic function .     4 - Mild aortic regurgitation.     5 - Concentric remodeling.         STRESS TEST 10/12/21  Normal myocardial perfusion scan. There is no evidence of myocardial ischemia or infarction.    The gated perfusion images showed an ejection fraction of 47% at rest. The gated perfusion images showed an ejection fraction of 63% post stress.    The EKG portion of this study is uninterpretable due to significant baseline abnormalities    The patient reported no chest pain during the stress test.      STRESS TEST 2017  EKG Conclusions:   1. The EKG portion of this study is negative for ischemia at a moderate workload, and peak heart rate of 80 bpm (55% of predicted).   2. Blood pressure remained stable throughout the protocol  (Presenting BP: 119/70 Peak BP: 114/49).   3. No significant arrhythmias were present.   4. There were no symptoms of chest  discomfort or significant dyspnea throughout the protocol.     Impression: NORMAL MYOCARDIAL PERFUSION   1. The perfusion scan is free of evidence for myocardial ischemia or injury.   2. Resting wall motion is physiologic.   3. Resting LV function is normal.   4. The ventricular volumes are normal at rest and stress.   5. The extracardiac distribution of radioactivity is normal.         KAMALJIT 12/4/20 (Normal ABIs)  The right ankle [DT] artery has triphasic flow.   The right ankle [DP] artery has triphasic flow.   The left ankle [PT] artery has triphasic flow.  The left ankle [DP] artery has triphasic flow.    [facility]      ROS: All 10 systems reviewed. Please refer to the HPI for pertinent positives. All other systems negative.     Past Medical History  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    Trouble in sleeping     Type 2 diabetes mellitus with diabetic polyneuropathy, without long-term current use of insulin     Urinary incontinence     Uses hearing aid     bilat       Surgical History  Past Surgical History:   Procedure Laterality Date    BACK SURGERY  2019    COLONOSCOPY N/A 6/30/2020    Procedure: COLONOSCOPY;  Surgeon: Joseph Iraheta MD;  Location: [facility];  Service: Endoscopy;  Laterality: N/A;    ESOPHAGOGASTRODUODENOSCOPY N/A 6/30/2020    Procedure: EGD (ESOPHAGOGASTRODUODENOSCOPY);  Surgeon: Joseph Iraheta MD;   Location: [facility] ENDO;  Service: Endoscopy;  Laterality: N/A;    HERNIA REPAIR Bilateral 04/18/2017    JOINT REPLACEMENT Left 10/09/2017    L YAHIR Dr. Alcocer    LAMINECTOMY  07/22/2016    left elbow  10/2017    procedure to remove gout    lumbar foraminotomy  03/2018    REVISION TOTAL HIP ARTHROPLASTY Left 9/11/2018    Procedure: REVISION, TOTAL ARTHROPLASTY, HIP;  Surgeon: Albaro Alcocer Sr., MD;  Location[facility] OR;  Service: Orthopedics;  Laterality: Left;    ROTATOR CUFF REPAIR Left 2006    SHOULDER ARTHROSCOPY W/ ROTATOR CUFF REPAIR Right 2009          Allergies:   Review of patient's allergies indicates:   Allergen Reactions    Sulfa (sulfonamide antibiotics)      Can't recall from 1995       Social History:  Social History     Socioeconomic History    Marital status:     Number of children: 0    Highest education level: Master's degree (e.g., MA, MS, Olegario, MEd, MSW, CARMEL)   Occupational History    Occupation: retired     Comment: teacher   Tobacco Use    Smoking status: Never    Smokeless tobacco: Never   Substance and Sexual Activity    Alcohol use: Yes     Alcohol/week: 1.0 standard drink     Types: 1 Glasses of wine per week     Comment: rarely  No alcohol prior to surgery    Drug use: No    Sexual activity: Yes     Partners: Female     Birth control/protection: Condom   Social History Narrative    . No children. Retired but some part time work - 4 hours a day. Managerial work at [employer] Caffeine intake - sugar free cola, Rare coffee intake. Still drives. Does have a Living Will . Has a nephew Jt Gayle, lives in [place]. Has a friend Karina Rossi, locally who could help him.        Family History:  family history includes Cancer (age of onset: 50) in his brother; Diabetes in his father, mother, and sister; Drug abuse in his brother; Heart disease in his father and mother; Hypertension in his father and mother; Stroke in his father.    Home Medications:  Current Outpatient Medications  on File Prior to Visit   Medication Sig Dispense Refill    albuterol (VENTOLIN HFA) 90 mcg/actuation inhaler Inhale 2 puffs into the lungs every 6 (six) hours as needed for Wheezing or Shortness of Breath. Rescue 18 g 3    allopurinoL (ZYLOPRIM) 100 MG tablet TAKE 1 TABLET EVERY DAY 90 tablet 3    bisacodyl (DULCOLAX) 10 mg Supp   0    blood glucose control, low Soln by Misc.(Non-Drug; Combo Route) route.      blood sugar diagnostic (TRUE METRIX GLUCOSE TEST STRIP) Strp Use as directed to check sugars 100 strip 11    blood-glucose meter Misc Use as directed to check sugars 1 each 0    clonazePAM (KLONOPIN) 0.5 MG tablet TAKE 1 TABLET BY MOUTH EVERY EVENING 30 tablet 2    cyanocobalamin, vitamin B-12, 1,000 mcg Subl Place 1,000 mcg under the tongue once daily. 90 tablet 3    ferrous sulfate 324 mg (65 mg iron) TbEC Take 1 tablet (324 mg total) by mouth once daily.      FLUZONE HIGHDOSE QUAD 21-22  mcg/0.7 mL Syrg       gabapentin (NEURONTIN) 600 MG tablet TAKE 1 TABLET(600 MG) BY MOUTH TWICE DAILY 180 tablet 2    GARLIC EXTRACT ORAL Take 1 tablet by mouth once daily. Per [facility]      JARDIANCE 10 mg tablet TAKE 1 TABLET(10 MG) BY MOUTH EVERY DAY 90 tablet 0    lancets Misc Use as directed to check sugars 100 each 11    LEXISCAN 0.4 mg/5 mL Syrg       losartan (COZAAR) 50 MG tablet TAKE 2 TABLETS EVERY  tablet 3    metFORMIN (GLUCOPHAGE) 1000 MG tablet TAKE 1 TABLET TWICE DAILY WITH MEALS 180 tablet 3    metoprolol succinate (TOPROL-XL) 50 MG 24 hr tablet TAKE 1 TABLET EVERY DAY 90 tablet 0    moxifloxacin (VIGAMOX) 0.5 % ophthalmic solution       multivitamin (THERAGRAN) per tablet Take 1 tablet by mouth once daily.      oxyCODONE-acetaminophen (PERCOCET) 5-325 mg per tablet Take 1 tablet by mouth every 8 (eight) hours as needed.      papaverine 30 mg/mL injection Inject 0.3 ml of trimix solution prn ED max once daily  Prepare 10ml of trimix solution containing:    Papaverine 30mg/ml     "Phentolamine 1 mg/ml    Alprostadil 10mcg/ml  Dispense 10ml per refill  Qs syringes 1cc/30g/0.5" and alcohol swabs dispense as needed for Intracavernosal injection 10 mL 5    pravastatin (PRAVACHOL) 40 MG tablet Take 1 tablet (40 mg total) by mouth once daily. 90 tablet 0    prednisoLONE acetate (PRED FORTE) 1 % DrpS       sildenafiL (VIAGRA) 100 MG tablet Take 1 tablet (100 mg total) by mouth daily as needed for Erectile Dysfunction. 30 tablet 2    tamsulosin (FLOMAX) 0.4 mg Cap Take 1 capsule (0.4 mg total) by mouth once daily. 90 capsule 0    aspirin (ECOTRIN) 81 MG EC tablet Take 1 tablet (81 mg total) by mouth once daily.  0    cetirizine (ZYRTEC) 10 MG tablet Take 1 tablet (10 mg total) by mouth once daily. for 14 days 30 tablet 1    diclofenac sodium (VOLTAREN) 1 % Gel Apply 2 g topically 4 (four) times daily. 100 g 5     No current facility-administered medications on file prior to visit.       Physical exam:  /70 (BP Location: Right arm, Patient Position: Sitting, BP Method: Medium (Manual))   Pulse 64   Ht 6' 3" (1.905 m)   Wt 98.9 kg (218 lb 0.6 oz)   SpO2 98%   BMI 27.25 kg/m²       General: Pt is a 81 y.o. year old male who is AAOx3, in NAD, is pleasant, well nourished, looks stated age, walks with a cane  HEENT: PERRL, EOMI, Oral mucosa pink & moist  CVS  No abnormal cardiac pulsations noted on inspection. JVP not raised. The apical impulse is normal on palpation, and is located in the left 5th intercostal space in the mid - clavicular line. No palpable thrills or abnormal pulsations noted. RR, S1 - S2 heard, 1/6 systolic murmur at apex, rubs or gallops appreciated.   PUL : CTA B/L. No wheezes/crakles heard   ABD : BS +, soft. No tenderness elicited   LE : No C/C/E. Distal Pulses palpable B/L         LABS:    Chemistry:   Lab Results   Component Value Date     06/01/2022    K 4.8 06/01/2022     06/01/2022    CO2 21 (L) 06/01/2022    BUN 22 06/01/2022    CREATININE 1.7 (H) " 06/01/2022    CALCIUM 8.7 06/01/2022     Cardiac Markers:   Lab Results   Component Value Date    TROPONINI <0.006 03/10/2015     Cardiac Markers (Last 3):   Lab Results   Component Value Date    TROPONINI <0.006 03/10/2015     CBC:   Lab Results   Component Value Date    WBC 4.57 06/01/2022    HGB 12.7 (L) 06/01/2022    HCT 37.5 (L) 06/01/2022    MCV 87 06/01/2022     06/01/2022     Lipids:   Lab Results   Component Value Date    CHOL 128 10/07/2022    TRIG 153 (H) 10/07/2022    HDL 31 (L) 10/07/2022     Coagulation:   Lab Results   Component Value Date    INR 1.0 07/31/2019    APTT 26.2 07/31/2019           Assessment    1. Aortic calcification    2. Hypertension associated with diabetes    3. Type 2 diabetes mellitus with diabetic peripheral angiopathy without gangrene, without long-term current use of insulin    4. CHARLES (obstructive sleep apnea)    5. Combined hyperlipidemia associated with type 2 diabetes mellitus    6. Atherosclerosis of aorta    7. Dyspnea on exertion    8. Lumbar spondylosis    9. Osteoarthritis of spine with radiculopathy, lumbar region    10. Type 2 diabetes mellitus with diabetic polyneuropathy, without long-term current use of insulin            Plan:    Dyspnea on exertion- stable   Possibly due to deconditioning  Echocardiogram with normal EF, mild AI (unchanged from prior)  Pharmacologic nuclear stress test without evidence of ischemia  F/u with pulmonology     CAD  H/o calcified aorta  Denies angina  Continue aspirin    HLD  LDL 66 as of 10/22  Continue pravastatin 40 mg daily    HTN  Stable  Continue losartan 50 mg daily, continue metoprolol 50 mg daily    Diabetes  Stable, A1c 6.0  Continue therapy     Arthritis  Chronic back pain- try tylenol   Patient does not take pain medications  Ambulates with a cane          I have reviewed all pertinent chart information.  Plans and recommendations have been formulated under my direct supervision. All questions answered and patient  voiced understanding.   If symptoms persist go to the ED.    RTC in 6 months       Yasmine Calvillo MD  Cardiology

## 2022-10-17 ENCOUNTER — HOSPITAL ENCOUNTER (OUTPATIENT)
Dept: PULMONOLOGY | Facility: HOSPITAL | Age: 82
Discharge: HOME | End: 2022-10-17
Attending: INTERNAL MEDICINE
Payer: MEDICARE

## 2022-10-17 VITALS — BODY MASS INDEX: 28.04 KG/M2 | WEIGHT: 224.31 LBS

## 2022-10-17 PROCEDURE — G0239 OTH RESP PROC, GROUP: HCPCS

## 2022-10-17 NOTE — PROGRESS NOTES
[facility]  Pulmonary Rehab  Session Summary    SUMMARY     Session Data  Session Number: 20  Time In: 1100  Time Out: 1200  Weight: 101.7 kg (224 lb 4.8 oz)  Target Heart Rate Zone: Minimum: 85 bpm  Target Heart Rate Zone: Maximum: 113 bpm  Patient Motivation: Excellent  Patient Effort: Excellent      Pre Exercise Vitals  SpO2: 100 %  Supplemental O2?: No  Pulse: 83  BP: 173/78  Bev Dyspnea Rating : 2      During Exercise Vitals  SpO2: 100 %  Supplemental O2?: No  Pulse: 94  BP: 158/70  Bev Dyspnea Rating : 3      Post Exercise Vitals  SpO2: 100 %  Supplemental O2?: No  Pulse: 92  BP: 137/61  Bev Dyspnea Rating : 3       Modality  Modality: Arm Ergometer, Hand Free Weights, Nustep      Arm Ergometer  Time: 14 minutes (1.77 miles)  Level: 2 (2.2 mets)    Nustep  Time: 20 minutes  Steps: 1463  Load: 6  Mets: 3.4        Biceps  lbs: 6 lbs (dumbbells)  Sets: 2  Reps: 10      Chest  lbs: 6 lbs (dumbbells)  Sets: 2  Reps: 10    Pulmonary Rehab COVID19 Screening Checklist    1. Are you having any of the following physical symptoms?   Yes [] No [x]     Fever or chills  Unexplained muscle or body aches  Cough  Shortness of breath or difficulty breathing  Fatigue  Headache  New loss of taste or smell  Sore throat  Congestion or runny nose  Nausea or vomiting  Diarrhea      2.  Have you come in close contact with anyone with the above symptoms or with known COVID19 in the last 14 days? Yes [] No [x]        3.  Have you tested positive for COVID19 in the last 30 days?   Yes [] No [x]        Education  Lessons learned with COPD  COPD action plan    Therapist Notes   Excellent effort. Increased to 6 lb dumbbells. Pt tolerated change well. Pt complained of back and knee pain. Got injections in both last week. Will continue to motivate and educate pt in rehab.    Dr. Machuca immediately available as needed.

## 2022-10-17 NOTE — PROGRESS NOTES
[facility]  Pulmonary Rehab  30 Day Evaluation and Recertification    SUMMARY       Summary of Progress: Srinath Mcknight has been doing excellent in rehab. He is increasing his exercise tolerance and will continue to benefit from pulmonary rehab. Pt has been enjoying his sessions but had to get knee and back steroid injections during this 30 day period, so he missed several sessions. Pt is working hard to get back to where he was. He is still having pain in those areas, so he cannot exercise on treadmill or recumbent bike at this time. Will continue to motivate and educate pt while striving to increase his strength and endurance in rehab.    Attends:     Patient attends 2 days a week and has completed 20 visits.  The patient's current compliance is 95%.    Referring provider:  Dr. Machuca    Start date:  22    Problems this Certification Period:   Back and knee injections    Exercise Capacity Summary        Nustep Date:  9/15/22   Time Load Steps Date:  10/12/22 Time Load Steps     25 4 1937  20 6 1463   Recumbent Bike Date:  22  (1.44 miles)   Time Level Date:  22  (1.49 miles) Time Level     10 2  10 2   Treadmill Date:     Time Speed Grade Date:   Time Speed Grade              Arm Ergometer Date:  22  (1.43 miles)   Time Level Date:  10/17/22  (1.77 miles) Time Level     12 2  14 2   Free Weights Date:  22  (Dumb)   Bicep Curls  Chest Press  Triceps  Legs lbs Sets Reps Date:  10/17/22  (Dumb) Bicep Curls  Chest Press  Triceps  Legs lbs Sets Reps      5 2 10   6 2 10                         Education:  Preventing infections  Tips for safe exercise  Pursed lip breathing  COPD action plan  Pulmonary knowledge test review  Lessons learned with COPD    Goals:  met    Updated Exercise Prescription:  Endurance Training: Nustep 25 mins, load 6, 1500 steps  Strength Trainin lb dumbbells, chest and biceps, 2 sets, 10 reps         I certify that the patient is making progress in  pulmonary rehabilitation, is physically able to participate, medically stable and remains motivated.

## 2022-10-18 ENCOUNTER — HOSPITAL ENCOUNTER (OUTPATIENT)
Dept: PULMONOLOGY | Facility: HOSPITAL | Age: 82
Discharge: HOME | End: 2022-10-18
Attending: INTERNAL MEDICINE
Payer: MEDICARE

## 2022-10-18 VITALS — BODY MASS INDEX: 27.96 KG/M2 | WEIGHT: 223.69 LBS

## 2022-10-18 PROCEDURE — G0239 OTH RESP PROC, GROUP: HCPCS

## 2022-10-18 NOTE — PROGRESS NOTES
[facility]  Pulmonary Rehab  Session Summary    SUMMARY     Session Data  Session Number: 21  Time In: 1100  Time Out: 1200  Weight: 101.5 kg (223 lb 11.2 oz)  Target Heart Rate Zone: Minimum: 85 bpm  Target Heart Rate Zone: Maximum: 113 bpm  Patient Motivation: Excellent  Patient Effort: Excellent      Pre Exercise Vitals  SpO2: 100 %  Supplemental O2?: No  Pulse: 75  BP: 150/71  Bev Dyspnea Rating : 2      During Exercise Vitals  SpO2: 100 %  Supplemental O2?: No  Pulse: 97  BP: 133/61  Bev Dyspnea Rating : 3      Post Exercise Vitals  SpO2: 99 %  Supplemental O2?: No  Pulse: 96  BP: 132/67  Bev Dyspnea Rating : 2       Modality  Modality: Arm Ergometer, Hand Free Weights, Nustep      Arm Ergometer  Time: 12 minutes (1.55 miles)  Level: 3 (2.5 mets)      Nustep  Time: 25 minutes  Steps: 1840  Load: 6  Mets: 3.5         Biceps  lbs: 6 lbs (dumbbells)  Sets: 2  Reps: 10      Chest  lbs: 6 lbs (dumbbells)  Sets: 2  Reps: 10    Pulmonary Rehab COVID19 Screening Checklist    1. Are you having any of the following physical symptoms?   Yes [] No [x]     Fever or chills  Unexplained muscle or body aches  Cough  Shortness of breath or difficulty breathing  Fatigue  Headache  New loss of taste or smell  Sore throat  Congestion or runny nose  Nausea or vomiting  Diarrhea      2.  Have you come in close contact with anyone with the above symptoms or with known COVID19 in the last 14 days? Yes [] No [x]        3.  Have you tested positive for COVID19 in the last 30 days?   Yes [] No [x]         Education  COPD action plan  Lessons learned with COPD      Therapist Notes   Excellent effort. Pt met 30 day goals on Nustep and with 6 lb dumbbells. He tolerated all changes and exercises well. Will continue to motivate and educate pt in rehab.    Dr. Machuca immediately available as needed.

## 2022-10-20 ENCOUNTER — HOSPITAL ENCOUNTER (OUTPATIENT)
Dept: PULMONOLOGY | Facility: HOSPITAL | Age: 82
Discharge: HOME | End: 2022-10-20
Attending: INTERNAL MEDICINE
Payer: MEDICARE

## 2022-10-20 VITALS — BODY MASS INDEX: 27.92 KG/M2 | WEIGHT: 223.38 LBS

## 2022-10-20 PROCEDURE — G0239 OTH RESP PROC, GROUP: HCPCS

## 2022-10-20 NOTE — PROGRESS NOTES
[facility]  Pulmonary Rehab  Session Summary    SUMMARY     Session Data  Session Number: 22  Time In: 1100  Time Out: 1200  Weight: 101.3 kg (223 lb 6.4 oz)  Target Heart Rate Zone: Minimum: 85 bpm  Target Heart Rate Zone: Maximum: 113 bpm  Patient Motivation: Excellent  Patient Effort: Excellent      Pre Exercise Vitals  SpO2: 100 %  Supplemental O2?: No  Pulse: 69  BP: 140/63  Bev Dyspnea Rating : 2      During Exercise Vitals  SpO2: 100 %  Supplemental O2?: No  Pulse: 87  BP: 150/68  Bev Dyspnea Rating : 3      Post Exercise Vitals  SpO2: 100 %  Supplemental O2?: No  Pulse: 89  BP: 141/69  Bev Dyspnea Rating : 3       Modality  Modality: Arm Ergometer, Hand Free Weights, Nustep      Arm Ergometer  Time: 12 minutes (1.53 miles)  Level: 3 (2.2 mets)      Nustep  Time: 25 minutes  Steps: 1811  Load: 6  Mets: 3.1      Biceps  lbs: 6 lbs (dumbbells)  Sets: 2  Reps: 10      Chest  lbs: 6 lbs (dumbbells)  Sets: 2  Reps: 10    Pulmonary Rehab COVID19 Screening Checklist    1. Are you having any of the following physical symptoms?   Yes [] No [x]     Fever or chills  Unexplained muscle or body aches  Cough  Shortness of breath or difficulty breathing  Fatigue  Headache  New loss of taste or smell  Sore throat  Congestion or runny nose  Nausea or vomiting  Diarrhea      2.  Have you come in close contact with anyone with the above symptoms or with known COVID19 in the last 14 days? Yes [] No [x]        3.  Have you tested positive for COVID19 in the last 30 days?   Yes [] No [x]      Education  COPD action plan  Lessons learned with COPD      Therapist Notes   Excellent effort. Pt's hip still hurting so no recumbent bike done today. He tolerated other exercises well. Vitals stable. Will continue to motivate and educate pt in rehab.    Dr. Machuca immediately available as needed.

## 2022-10-24 ENCOUNTER — HOSPITAL ENCOUNTER (OUTPATIENT)
Dept: PULMONOLOGY | Facility: HOSPITAL | Age: 82
Discharge: HOME | End: 2022-10-24
Attending: INTERNAL MEDICINE
Payer: MEDICARE

## 2022-10-24 VITALS — WEIGHT: 217 LBS | BODY MASS INDEX: 27.12 KG/M2

## 2022-10-24 PROCEDURE — G0239 OTH RESP PROC, GROUP: HCPCS

## 2022-10-24 NOTE — PROGRESS NOTES
[facility]  Pulmonary Rehab  Session Summary    SUMMARY     Session Data  Session Number: 23  Time In: 1100  Time Out: 1200  Weight: 98.4 kg (217 lb)  Target Heart Rate Zone: Minimum: 85 bpm  Target Heart Rate Zone: Maximum: 113 bpm  Patient Motivation: Excellent  Patient Effort: Excellent      Pre Exercise Vitals  SpO2: 99 %  Supplemental O2?: No  Pulse: 69  BP: 139/72  Bev Dyspnea Rating : 2      During Exercise Vitals  SpO2: 98 %  Supplemental O2?: No  Pulse: 72  BP: 137/60  Bev Dyspnea Rating : 3      Post Exercise Vitals  SpO2: 97 %  Supplemental O2?: No  Pulse: 85  BP: 129/58  Bev Dyspnea Rating : 2       Modality  Modality: Arm Ergometer, Hand Free Weights, Nustep      Arm Ergometer  Time: 12 minutes (1.59 miles)  Level: 3 (1.9 mets)      Nustep  Time: 25 minutes  Steps: 1862  Load: 6  Mets: 3.3      Biceps  lbs: 6 lbs (dumbbells)  Sets: 2  Reps: 10      Chest  lbs: 6 lbs (dumbbells)  Sets: 2  Reps: 10    Pulmonary Rehab COVID19 Screening Checklist    1. Are you having any of the following physical symptoms?   Yes [] No [x]     Fever or chills  Unexplained muscle or body aches  Cough  Shortness of breath or difficulty breathing  Fatigue  Headache  New loss of taste or smell  Sore throat  Congestion or runny nose  Nausea or vomiting  Diarrhea      2.  Have you come in close contact with anyone with the above symptoms or with known COVID19 in the last 14 days? Yes [] No [x]        3.  Have you tested positive for COVID19 in the last 30 days?   Yes [] No [x]         Education  Compliance in rehab  Exercise tips      Therapist Notes   Excellent effort. Pt has met all 30 day goals. He tolerated exercise well. Vitals stable. Will continue to motivate and educate pt in rehab.    Dr. Ferrell immediately available as needed.

## 2022-10-25 ENCOUNTER — HOSPITAL ENCOUNTER (OUTPATIENT)
Dept: PULMONOLOGY | Facility: HOSPITAL | Age: 82
Discharge: HOME | End: 2022-10-25
Attending: INTERNAL MEDICINE
Payer: MEDICARE

## 2022-10-25 VITALS — BODY MASS INDEX: 26.99 KG/M2 | WEIGHT: 215.88 LBS

## 2022-10-25 PROCEDURE — G0239 OTH RESP PROC, GROUP: HCPCS

## 2022-10-25 NOTE — PROGRESS NOTES
[facility]  Pulmonary Rehab  Session Summary    SUMMARY     Session Data  Session Number: 24  Time In: 1100  Time Out: 1200  Weight: 97.9 kg (215 lb 14.4 oz)  Target Heart Rate Zone: Minimum: 85 bpm  Target Heart Rate Zone: Maximum: 113 bpm  Patient Motivation: Excellent  Patient Effort: Excellent      Pre Exercise Vitals  SpO2: 98 %  Supplemental O2?: No  Pulse: 77  BP: 133/62  Bev Dyspnea Rating : 2      During Exercise Vitals  SpO2: 99 %  Supplemental O2?: No  Pulse: 80  BP: 139/65  Bev Dyspnea Rating : 3      Post Exercise Vitals  SpO2: 100 %  Supplemental O2?: No  Pulse: 90  BP: 134/63  Bev Dyspnea Rating : 2       Modality  Modality: Arm Ergometer, Hand Free Weights, Nustep    Arm Ergometer  Time: 12 minutes (1.54 miles)  Level: 3 (2.6 mets)      Nustep  Time: 25 minutes  Steps: 1810  Load: 6  Mets: 3.3        Biceps  lbs: 6 lbs (dumbbells)  Sets: 2  Reps: 10      Chest  lbs: 6 lbs (dumbbells)  Sets: 2  Reps: 10    Pulmonary Rehab COVID19 Screening Checklist    1. Are you having any of the following physical symptoms?   Yes [] No [x]     Fever or chills  Unexplained muscle or body aches  Cough  Shortness of breath or difficulty breathing  Fatigue  Headache  New loss of taste or smell  Sore throat  Congestion or runny nose  Nausea or vomiting  Diarrhea      2.  Have you come in close contact with anyone with the above symptoms or with known COVID19 in the last 14 days? Yes [] No [x]        3.  Have you tested positive for COVID19 in the last 30 days?   Yes [] No [x]          Education  Stress and SOB  O2 therapy      Therapist Notes   Excellent effort. Pt tolerated all exercises well. Vitals stable. Will continue to motivate and educate pt in rehab.    Dr. Pereira immediately available as needed.

## 2022-10-27 ENCOUNTER — HOSPITAL ENCOUNTER (OUTPATIENT)
Dept: PULMONOLOGY | Facility: HOSPITAL | Age: 82
Discharge: HOME | End: 2022-10-27
Attending: INTERNAL MEDICINE
Payer: MEDICARE

## 2022-10-27 VITALS — WEIGHT: 218.88 LBS | BODY MASS INDEX: 27.36 KG/M2

## 2022-10-27 PROCEDURE — G0239 OTH RESP PROC, GROUP: HCPCS

## 2022-10-27 NOTE — PROGRESS NOTES
[facility]  Pulmonary Rehab  Session Summary    SUMMARY     Session Data  Session Number: 25  Time In: 1100  Time Out: 1200  Weight: 99.3 kg (218 lb 14.4 oz)  Target Heart Rate Zone: Minimum: 85 bpm  Target Heart Rate Zone: Maximum: 113 bpm  Patient Motivation: Excellent  Patient Effort: Excellent      Pre Exercise Vitals  SpO2: 98 %  Supplemental O2?: No  Pulse: 72  BP: 137/80  Bev Dyspnea Rating : 1      During Exercise Vitals  SpO2: 99 %  Supplemental O2?: No  Pulse: 88  BP: 142/79  Bev Dyspnea Rating : 2      Post Exercise Vitals  SpO2: 98 %  Supplemental O2?: No  Pulse: 86  BP: 140/74  Bev Dyspnea Rating : 1       Modality  Modality: Arm Ergometer, Hand Free Weights, Nustep      Arm Ergometer  Time: 12 minutes (1.51 miles)  Level: 3 (2.6 mets)      Nustep  Time: 25 minutes  Steps: 1898  Load: 6  Mets: 3.3         Biceps  lbs: 6 lbs (dumbbells)  Sets: 2  Reps: 10      Chest  lbs: 6 lbs (dumbbells)  Sets: 2  Reps: 10    Pulmonary Rehab COVID19 Screening Checklist    1. Are you having any of the following physical symptoms?   Yes [] No [x]     Fever or chills  Unexplained muscle or body aches  Cough  Shortness of breath or difficulty breathing  Fatigue  Headache  New loss of taste or smell  Sore throat  Congestion or runny nose  Nausea or vomiting  Diarrhea      2.  Have you come in close contact with anyone with the above symptoms or with known COVID19 in the last 14 days? Yes [] No [x]        3.  Have you tested positive for COVID19 in the last 30 days?   Yes [] No [x]        Education  Pulm knowledge test review      Therapist Notes   Excellent effort. Pt tolerated all exercises well. Vitals were stable. Pt's back and knees are still hurting him so no recumbent bike done. Will continue to motivate and educate pt in rehab.    Dr. Ferrell immediately available as needed.

## 2022-10-31 ENCOUNTER — TELEPHONE (OUTPATIENT)
Dept: ADMINISTRATIVE | Facility: HOSPITAL | Age: 82
End: 2022-10-31
Payer: MEDICARE

## 2022-11-01 ENCOUNTER — HOSPITAL ENCOUNTER (OUTPATIENT)
Dept: PULMONOLOGY | Facility: HOSPITAL | Age: 82
Discharge: HOME | End: 2022-11-01
Attending: INTERNAL MEDICINE
Payer: MEDICARE

## 2022-11-01 VITALS — WEIGHT: 219 LBS | BODY MASS INDEX: 27.37 KG/M2

## 2022-11-01 PROCEDURE — G0239 OTH RESP PROC, GROUP: HCPCS

## 2022-11-01 NOTE — PROGRESS NOTES
[facility]  Pulmonary Rehab  Session Summary    SUMMARY     Session Data  Session Number: 26  Time In: 1100  Time Out: 1200  Weight: 99.3 kg (219 lb)  Target Heart Rate Zone: Minimum: 85 bpm  Target Heart Rate Zone: Maximum: 113 bpm  Patient Motivation: Excellent  Patient Effort: Excellent      Pre Exercise Vitals  SpO2: 98 %  Supplemental O2?: No  Pulse: 68  BP: 142/67  Bev Dyspnea Rating : 2      During Exercise Vitals  SpO2: 99 %  Supplemental O2?: No  Pulse: 83  BP: 136/69  Bev Dyspnea Rating : 2      Post Exercise Vitals  SpO2: 98 %  Supplemental O2?: No  Pulse: 96  BP: 114/73  Bev Dyspnea Rating : 2       Modality  Modality: Arm Ergometer, Hand Free Weights, Nustep    Arm Ergometer  Time: 12 minutes (1.6 miles)  Level: 3 (2.7 mets)    Nustep  Time: 25 minutes  Steps: 1826  Load: 6  Mets: 3.2      Biceps  lbs: 6 lbs (dumbbells)  Sets: 2  Reps: 10      Chest  lbs: 6 lbs (dumbbells)  Sets: 2  Reps: 10    Pulmonary Rehab COVID19 Screening Checklist    1. Are you having any of the following physical symptoms?   Yes [] No [x]     Fever or chills  Unexplained muscle or body aches  Cough  Shortness of breath or difficulty breathing  Fatigue  Headache  New loss of taste or smell  Sore throat  Congestion or runny nose  Nausea or vomiting  Diarrhea      2.  Have you come in close contact with anyone with the above symptoms or with known COVID19 in the last 14 days? Yes [] No [x]        3.  Have you tested positive for COVID19 in the last 30 days?   Yes [] No [x]        Education  Maximizing energy      Therapist Notes   Excellent effort. Pt tolerated all exercises well. Vitals stable. Will continue to motivate and educate pt in rehab.    Dr. Ferrell immediately available as needed.

## 2022-11-03 ENCOUNTER — HOSPITAL ENCOUNTER (OUTPATIENT)
Dept: PULMONOLOGY | Facility: HOSPITAL | Age: 82
Discharge: HOME | End: 2022-11-03
Attending: INTERNAL MEDICINE
Payer: MEDICARE

## 2022-11-03 VITALS — BODY MASS INDEX: 27.31 KG/M2 | WEIGHT: 218.5 LBS

## 2022-11-03 PROCEDURE — G0239 OTH RESP PROC, GROUP: HCPCS

## 2022-11-03 NOTE — PROGRESS NOTES
[facility]  Pulmonary Rehab  Session Summary    SUMMARY     Session Data  Session Number: 27  Time In: 1100  Time Out: 1200  Weight: 99.1 kg (218 lb 8 oz)  Target Heart Rate Zone: Minimum: 85 bpm  Target Heart Rate Zone: Maximum: 113 bpm  Patient Motivation: Excellent  Patient Effort: Excellent      Pre Exercise Vitals  SpO2: 98 %  Supplemental O2?: No  Pulse: 77  BP: 138/74  Bev Dyspnea Rating : 2      During Exercise Vitals  SpO2: 98 %  Supplemental O2?: No  Pulse: 83  BP: 134/66  Bev Dyspnea Rating : 2      Post Exercise Vitals  SpO2: 98 %  Supplemental O2?: No  Pulse: 92  BP: 131/70  Bev Dyspnea Rating : 1       Modality  Modality: Arm Ergometer, Hand Free Weights, Nustep    Arm Ergometer  Time: 12 minutes (1.62 miles)  Level: 3 (2.6 mets)      Nustep  Time: 25 minutes  Steps: 1858  Load: 6  Mets: 3.2         Biceps  lbs: 6 lbs (dumbbells)  Sets: 2  Reps: 10      Chest  lbs: 6 lbs (dumbbells)  Sets: 2  Reps: 10    Pulmonary Rehab COVID19 Screening Checklist    1. Are you having any of the following physical symptoms?   Yes [] No [x]     Fever or chills  Unexplained muscle or body aches  Cough  Shortness of breath or difficulty breathing  Fatigue  Headache  New loss of taste or smell  Sore throat  Congestion or runny nose  Nausea or vomiting  Diarrhea      2.  Have you come in close contact with anyone with the above symptoms or with known COVID19 in the last 14 days? Yes [] No [x]        3.  Have you tested positive for COVID19 in the last 30 days?   Yes [] No [x]        Education  Traveling with O2      Therapist Notes   Excellent effort. Pt tolerated all exercises well. Pt works hard in his sessions but has knee pain which limits his exercise. Will continue to motivate and educate pt in rehab.    Dr. Machuca immediately available as needed.

## 2022-11-04 ENCOUNTER — OFFICE VISIT (OUTPATIENT)
Dept: PODIATRY | Facility: CLINIC | Age: 82
End: 2022-11-04
Payer: MEDICARE

## 2022-11-04 VITALS — BODY MASS INDEX: 27.17 KG/M2 | WEIGHT: 218.5 LBS | HEIGHT: 75 IN

## 2022-11-04 DIAGNOSIS — N18.30 DIABETES MELLITUS WITH STAGE 3 CHRONIC KIDNEY DISEASE: ICD-10-CM

## 2022-11-04 DIAGNOSIS — E11.22 DIABETES MELLITUS WITH STAGE 3 CHRONIC KIDNEY DISEASE: ICD-10-CM

## 2022-11-04 DIAGNOSIS — I87.2 VENOUS INSUFFICIENCY OF BOTH LOWER EXTREMITIES: ICD-10-CM

## 2022-11-04 DIAGNOSIS — E11.9 COMPREHENSIVE DIABETIC FOOT EXAMINATION, TYPE 2 DM, ENCOUNTER FOR: Primary | ICD-10-CM

## 2022-11-04 DIAGNOSIS — B35.1 ONYCHOMYCOSIS: ICD-10-CM

## 2022-11-04 DIAGNOSIS — E11.42 TYPE 2 DIABETES MELLITUS WITH DIABETIC POLYNEUROPATHY, WITHOUT LONG-TERM CURRENT USE OF INSULIN: Chronic | ICD-10-CM

## 2022-11-04 DIAGNOSIS — E11.51 TYPE 2 DIABETES MELLITUS WITH DIABETIC PERIPHERAL ANGIOPATHY WITHOUT GANGRENE, WITHOUT LONG-TERM CURRENT USE OF INSULIN: ICD-10-CM

## 2022-11-04 PROCEDURE — 99214 PR OFFICE/OUTPT VISIT, EST, LEVL IV, 30-39 MIN: ICD-10-PCS | Mod: 25,S$GLB,, | Performed by: PODIATRIST

## 2022-11-04 PROCEDURE — 1159F PR MEDICATION LIST DOCUMENTED IN MEDICAL RECORD: ICD-10-PCS | Mod: CPTII,S$GLB,, | Performed by: PODIATRIST

## 2022-11-04 PROCEDURE — 99999 PR PBB SHADOW E&M-EST. PATIENT-LVL II: ICD-10-PCS | Mod: PBBFAC,,, | Performed by: PODIATRIST

## 2022-11-04 PROCEDURE — 99214 OFFICE O/P EST MOD 30 MIN: CPT | Mod: 25,S$GLB,, | Performed by: PODIATRIST

## 2022-11-04 PROCEDURE — 11721 DEBRIDE NAIL 6 OR MORE: CPT | Mod: Q8,S$GLB,, | Performed by: PODIATRIST

## 2022-11-04 PROCEDURE — 3072F PR LOW RISK FOR RETINOPATHY: ICD-10-PCS | Mod: CPTII,S$GLB,, | Performed by: PODIATRIST

## 2022-11-04 PROCEDURE — 99999 PR PBB SHADOW E&M-EST. PATIENT-LVL II: CPT | Mod: PBBFAC,,, | Performed by: PODIATRIST

## 2022-11-04 PROCEDURE — 1160F PR REVIEW ALL MEDS BY PRESCRIBER/CLIN PHARMACIST DOCUMENTED: ICD-10-PCS | Mod: CPTII,S$GLB,, | Performed by: PODIATRIST

## 2022-11-04 PROCEDURE — 1160F RVW MEDS BY RX/DR IN RCRD: CPT | Mod: CPTII,S$GLB,, | Performed by: PODIATRIST

## 2022-11-04 PROCEDURE — 99499 UNLISTED E&M SERVICE: CPT | Mod: HCNC,S$GLB,, | Performed by: PODIATRIST

## 2022-11-04 PROCEDURE — 11721 PR DEBRIDEMENT OF NAILS, 6 OR MORE: ICD-10-PCS | Mod: Q8,S$GLB,, | Performed by: PODIATRIST

## 2022-11-04 PROCEDURE — 3072F LOW RISK FOR RETINOPATHY: CPT | Mod: CPTII,S$GLB,, | Performed by: PODIATRIST

## 2022-11-04 PROCEDURE — 1159F MED LIST DOCD IN RCRD: CPT | Mod: CPTII,S$GLB,, | Performed by: PODIATRIST

## 2022-11-04 NOTE — PROGRESS NOTES
Subjective:       Patient ID: Srinath Mcknight is a 81 y.o. male.    Chief Complaint: Diabetic Foot Exam (MILD NEUROPATHIC PAIN, Diabetic, pcp Dr. Wilder last seen 09/20/22 with Dr. Roger)      HPI: Srinath Mcknight presents to the office today, under referral by their Primary Care Provider, Yeison Wilder MD, for his annual diabetic foot assessment and risk evalution Patient is a DMII. Patient states neuropathy, peripheral arterial disease, venous insufficiency, and kidney pathology. This patient last saw his/her primary care provider on 9/20/22. States worsening neuropathy.     Hemoglobin A1C   Date Value Ref Range Status   06/23/2022 6.0 (H) 4.0 - 5.6 % Final     Comment:     ADA Screening Guidelines:  5.7-6.4%  Consistent with prediabetes  >or=6.5%  Consistent with diabetes    High levels of fetal hemoglobin interfere with the HbA1C  assay. Heterozygous hemoglobin variants (HbS, HgC, etc)do  not significantly interfere with this assay.   However, presence of multiple variants may affect accuracy.     09/17/2021 6.2 (H) 4.0 - 5.6 % Final     Comment:     ADA Screening Guidelines:  5.7-6.4%  Consistent with prediabetes  >or=6.5%  Consistent with diabetes    High levels of fetal hemoglobin interfere with the HbA1C  assay. Heterozygous hemoglobin variants (HbS, HgC, etc)do  not significantly interfere with this assay.   However, presence of multiple variants may affect accuracy.     05/10/2021 5.9 (H) 4.0 - 5.6 % Final     Comment:     ADA Screening Guidelines:  5.7-6.4%  Consistent with prediabetes  >or=6.5%  Consistent with diabetes    High levels of fetal hemoglobin interfere with the HbA1C  assay. Heterozygous hemoglobin variants (HbS, HgC, etc)do  not significantly interfere with this assay.   However, presence of multiple variants may affect accuracy.     .    Review of patient's allergies indicates:   Allergen Reactions    Sulfa (sulfonamide antibiotics)      Can't recall from 1995       Past Medical History:    Diagnosis Date    Anemia due to unknown mechanism 11/10/2015    Angina pectoris 2014    not since    Arthritis     Back pain     Coronary artery disease     Diabetes mellitus with stage 3 chronic kidney disease 11/18/2020    DM (diabetes mellitus) 25-30 years     am 05/15/2019    DM (diabetes mellitus)     BS 97 am 06/04/2021    Encounter for blood transfusion     Gout 12/05/2017    right foot     History of blood transfusion     Hyperlipemia     Hypertension     CHARLES (obstructive sleep apnea)     Polyneuropathy     Spinal cord disease     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    Trouble in sleeping     Type 2 diabetes mellitus with diabetic polyneuropathy, without long-term current use of insulin     Urinary incontinence     Uses hearing aid     bilat       Family History   Problem Relation Age of Onset    Hypertension Mother     Heart disease Mother     Diabetes Mother     Hypertension Father     Heart disease Father     Diabetes Father     Stroke Father     Diabetes Sister     Cancer Brother 50        pancreas    Drug abuse Brother     Prostate cancer Neg Hx     Macular degeneration Neg Hx     Retinal detachment Neg Hx     Strabismus Neg Hx     Glaucoma Neg Hx     Blindness Neg Hx     Amblyopia Neg Hx     Kidney disease Neg Hx     Mental illness Neg Hx     Mental retardation Neg Hx     COPD Neg Hx     Asthma Neg Hx        Social History     Socioeconomic History    Marital status:     Number of children: 0    Highest education level: Master's degree (e.g., MA, MS, Olegario, MEd, MSW, CARMEL)   Occupational History    Occupation: retired     Comment: teacher   Tobacco Use    Smoking status: Never    Smokeless tobacco: Never   Substance and Sexual Activity    Alcohol use: Yes     Alcohol/week: 1.0 standard drink     Types: 1 Glasses of wine per week     Comment: rarely  No alcohol prior to surgery    Drug use: No    Sexual activity: Yes     Partners: Female     Birth control/protection: Condom    Social History Narrative    . No children. Retired but some part time work - 4 hours a day. 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 Caffeine intake - sugar free cola, Rare coffee intake. Still drives. Does have a Living Will . Has a nephew Jt Gayle, lives in [place]. Has a friend Karina Rossi, locally who could help him.        Past Surgical History:   Procedure Laterality Date    BACK SURGERY  2019    COLONOSCOPY N/A 6/30/2020    Procedure: COLONOSCOPY;  Surgeon: Joseph Iraheta MD;  Location: [facility];  Service: Endoscopy;  Laterality: N/A;    ESOPHAGOGASTRODUODENOSCOPY N/A 6/30/2020    Procedure: EGD (ESOPHAGOGASTRODUODENOSCOPY);  Surgeon: Joseph Iraheta MD;  Location: [facility];  Service: Endoscopy;  Laterality: N/A;    HERNIA REPAIR Bilateral 04/18/2017    JOINT REPLACEMENT Left 10/09/2017    L YAHIR Dr. Alcocer    LAMINECTOMY  07/22/2016    left elbow  10/2017    procedure to remove gout    lumbar foraminotomy  03/2018    REVISION TOTAL HIP ARTHROPLASTY Left 9/11/2018    Procedure: REVISION, TOTAL ARTHROPLASTY, HIP;  Surgeon: Albaro Alcocer Sr., MD;  Location: [facility];  Service: Orthopedics;  Laterality: Left;    ROTATOR CUFF REPAIR Left 2006    SHOULDER ARTHROSCOPY W/ ROTATOR CUFF REPAIR Right 2009       Review of Systems   Constitutional:  Negative for chills, fatigue and fever.   HENT:  Negative for hearing loss.    Eyes:  Negative for photophobia and visual disturbance.   Respiratory:  Negative for cough, chest tightness, shortness of breath and wheezing.    Cardiovascular:  Positive for leg swelling. Negative for chest pain and palpitations.   Gastrointestinal:  Negative for constipation, diarrhea, nausea and vomiting.   Endocrine: Negative for cold intolerance and heat intolerance.   Genitourinary:  Negative for flank pain.   Musculoskeletal:  Positive for arthralgias and gait problem. Negative for neck pain and neck stiffness.   Neurological:  Positive for numbness. Negative  "for light-headedness and headaches.   Psychiatric/Behavioral:  Negative for sleep disturbance.        Objective:   Ht 6' 3" (1.905 m)   Wt 99.1 kg (218 lb 7.6 oz)   BMI 27.31 kg/m²     Physical Exam  LOWER EXTREMITY PHYSICAL EXAMINATION    ORTHOPEDIC: Pes planus foot type is noted. Equinus contracture is noted. MMT is 5/5.    VASCULAR: Capillary refill time is less than 3s. Edema is 2+ pitting and severe. The left dorsalis pedis pulse is 1/4 and the right dorsalis pedis pulse is 1/4. The left posterior tibial pulse is 0/4 and the right posterior tibial pulse is 0/4. Varicosities are noted. Spider veins and telangiectasias are noted. Hair growth is sparse to absent. Skin appearance is WNL. Proximal to distal warm to warm. Elevation palor is absent. Dependent rubor is absent.    NEUROLOGY: Proprioception is intact. Sensation to light touch is intact. Sensation to pin prick is decreased. Vibratory sensation is decreased to the left and right lower extremity. Examination with 5.07 Sarver Kyra monofilament reveals that protective sensation is not intact to the left and right plantar surfaces of the foot and digits, as the patient has decreased sensation/detection at greater than 4 distinct points of contact.     DERMATOLOGY: Skin is supple, moist, dry, and intact. On the left foot, nails 1, 2, 3, 4, and 5 are suggestive of onychomycotic changes. On the right foot, nails 1, 2, 3, 4, and 5 are suggestive of onychomycotic changes. These nail plates are thickened, are dystrophic, chaulky in appearance and malodorous with substantial subungual debris. These nail plates are yellow to brown in appearance. The remaining nail plates are elongated and do not have suggestive clinical features of onychomycosis.    Assessment:     1. Comprehensive diabetic foot examination, type 2 DM, encounter for    2. Type 2 diabetes mellitus with diabetic polyneuropathy, without long-term current use of insulin    3. Type 2 diabetes " mellitus with diabetic peripheral angiopathy without gangrene, without long-term current use of insulin    4. Venous insufficiency of both lower extremities    5. Diabetes mellitus with stage 3 chronic kidney disease        Plan:     Comprehensive diabetic foot examination, type 2 DM, encounter for  I counseled the patient on his/her Diabetic Mellitus regarding today's clinical examination and objection findings. We did also discuss recent medication changes, pertinent labs and imaging evaluations and other medical consultation notes and progress notes. Greater than 50% of this visit was spent on counseling and coordination of care. Greater than 20 minutes of this appt. was spent on education about the diabetic foot, in relation to PVD and/or neuropathy, and the prevention of limb loss.     Shoe gear is inspected and wear and proper fit/type. Patient is reminded of the importance of good nutrition and blood sugar control to help prevent podiatric complications of diabetes. Patient instructed on proper foot hygeine. We discussed wearing proper shoe gear, daily foot inspections, never walking without protective shoe gear, never putting sharp instruments to feet.  Patient  will continue to monitor the areas daily, inspect feet, wear protective shoe gear when ambulatory, moisturizer to maintain skin integrity.     Patient's DMI/DMII is managed by Primary Care Provider and/or Endocrinology Advanced Practice Provider. As per the most recent glycohemoglobin level, this patient is at goal.    Type 2 diabetes mellitus with diabetic polyneuropathy, without long-term current use of insulin  Thorough discussion is had with the patient today, concerning the diagnosis, its etiology, and the treatment algorithm at present.     Increase Gabapentin to 600mg TID for now. If no improvement, will consider initiation of Lyrica due to CKD.    Please call my office in 3-4 weeks to let me know if this algorithm is helpful.    Patient is  educated as to the use and the effectiveness of Gabapentin, as well as the potential side effects, which may include, but is not limited to, mood or behavior changes, anxiety, depression, feelings of agitation or hostility or restlessness, hyperactive (mentally or physically), thoughts of suicide or hurting oneself, increased seizures, fever, swollen glands, body aches, flu symptoms, drowsiness, skin rash, easy bruising or bleeding, severe tingling, numbness, pain, muscle weakness, upper stomach pain, loss of appetite, dark urine, jaundice (yellowing of the skin or eyes), chest pain, irregular heart rhythm, feeling short of breath, confusion, nausea and vomiting, swelling, rapid weight gain, urinating less than usual or not at all, new or worsening cough, fever, trouble breathing, rapid back and forth movement of your eyes. With the initial dosage, please take at night prior to bedtime until getting used to its potential drowsy effect.      Type 2 diabetes mellitus with diabetic peripheral angiopathy without gangrene, without long-term current use of insulin    Venous insufficiency of both lower extremities  Did discuss possible initiation and/or continuation of lower extremity compression therapy (stockings).  Recommend continue w/ limb elevation.  Continue oral diuretic if no overt contraindication.  I encouraged the patient to follow-up and discuss with his/her PCP.      Diabetes mellitus with stage 3 chronic kidney disease  Patient advised to follow up with Primary Care Provider and/or Nephrologist for management of kidney pathology.     Onychomycosis  Onychomycotic nail plates, as outlined above, are sharply debrided with double action nail nipper, and/or with the assistance of a mechanical rotary gamaliel for reduction of pains. Nails are reduced in terms of length, width and girth with removal of subungual debris to facilitate pain free weight bearing and ambulation. Elongated nails as outlined in the objective  portion of this note, were trimmed to appropriate length for alleviation/reduction of pains as well. Follow up in approx. 9-12 weeks.         Protective Sensation (w/ 10 gram monofilament):  Right: Absent  Left: Absent    Visual Inspection:  Normal -  Bilateral    Pedal Pulses:   Right: Diminished  Left: Diminished    Posterior tibialis:   Right:Absent  Left: Absent              Future Appointments   Date Time Provider Department Center   11/7/2022 10:30 AM LABORATORY, [facility]   11/8/2022 11:00 AM PULMONARY REHAB, [facility] P[facility]   11/9/2022  8:00 AM [facility] US1 [facility] ULSOUND [facility]   11/9/2022  9:00 AM Debo Nguyễn NP [facility] UROLOGY  [facility]   11/9/2022  2:15 PM Shan Winters MD [facility] ORTHO [facility]   11/10/2022 11:00 AM PULMONARY REHAB, [facility] PULREHB [facility]   11/14/2022  3:40 PM Yeison Wilder MD [facility] [place]   11/15/2022 11:00 AM PULMONARY REHAB, [facility] PULREHB [facility]   11/17/2022 11:00 AM PULMONARY REHAB, [facility] PULREHB [place]   11/22/2022 11:00 AM PULMONARY REHAB, [facility] PULREHB [facility]   11/29/2022 11:00 AM PULMONARY REHAB, [facility] PULREHB [place]   12/1/2022 11:00 AM PULMONARY REHAB, [facility] PULREHB [facility]   12/6/2022 11:20 AM LABORATORY, [facility]   12/13/2022  2:00 PM Megha George NP [facility] HEM ON[facility]   12/29/2022  9:40 AM Yeison Wilder MD [facility] IM [place]   1/5/2023  3:00 PM Yeison Wilder MD [facility] [place]   2/8/2023  9:20 AM Martin Machuca MD [facility] P[facility]   4/18/2023 11:20 AM Yasmine Calvillo MD [facility] CARDIO [place]

## 2022-11-07 ENCOUNTER — HOSPITAL ENCOUNTER (OUTPATIENT)
Dept: PULMONOLOGY | Facility: HOSPITAL | Age: 82
Discharge: HOME | End: 2022-11-07
Attending: INTERNAL MEDICINE
Payer: MEDICARE

## 2022-11-07 ENCOUNTER — LAB VISIT (OUTPATIENT)
Dept: LAB | Facility: HOSPITAL | Age: 82
End: 2022-11-07
Attending: NURSE PRACTITIONER
Payer: MEDICARE

## 2022-11-07 VITALS — WEIGHT: 218.31 LBS | BODY MASS INDEX: 27.29 KG/M2

## 2022-11-07 DIAGNOSIS — Z12.5 PROSTATE CANCER SCREENING: ICD-10-CM

## 2022-11-07 LAB — COMPLEXED PSA SERPL-MCNC: 0.77 NG/ML (ref 0–4)

## 2022-11-07 PROCEDURE — 36415 COLL VENOUS BLD VENIPUNCTURE: CPT | Performed by: NURSE PRACTITIONER

## 2022-11-07 PROCEDURE — 84153 ASSAY OF PSA TOTAL: CPT | Performed by: NURSE PRACTITIONER

## 2022-11-07 PROCEDURE — G0239 OTH RESP PROC, GROUP: HCPCS

## 2022-11-07 NOTE — PROGRESS NOTES
[facility]  Pulmonary Rehab  Session Summary    SUMMARY     Session Data  Session Number: 28  Time In: 1100  Time Out: 1200  Weight: 99 kg (218 lb 4.8 oz)  Target Heart Rate Zone: Minimum: 85 bpm  Target Heart Rate Zone: Maximum: 113 bpm  Patient Motivation: Excellent  Patient Effort: Excellent      Pre Exercise Vitals  SpO2: 98 %  Supplemental O2?: No  Pulse: 73  BP: 113/55  Bev Dyspnea Rating : 0      During Exercise Vitals  SpO2: 100 %  Supplemental O2?: No  Pulse: 78  BP: 128/59  Bev Dyspnea Rating : 1      Post Exercise Vitals  SpO2: 100 %  Supplemental O2?: No  Pulse: 73  BP: 131/58  Bev Dyspnea Rating : 1       Modality  Modality: Arm Ergometer, Hand Free Weights, Nustep      Arm Ergometer  Time: 12 minutes (1.53 miles)  Level: 3 (3 mets)      Nustep  Time: 25 minutes  Steps: 1816  Load: 6  Mets: 3    Biceps  lbs: 6 lbs (dumbbells)  Sets: 2  Reps: 10      Chest  lbs: 6 lbs (dumbbells)  Sets: 2  Reps: 10    Pulmonary Rehab COVID19 Screening Checklist    1. Are you having any of the following physical symptoms?   Yes [] No [x]     Fever or chills  Unexplained muscle or body aches  Cough  Shortness of breath or difficulty breathing  Fatigue  Headache  New loss of taste or smell  Sore throat  Congestion or runny nose  Nausea or vomiting  Diarrhea      2.  Have you come in close contact with anyone with the above symptoms or with known COVID19 in the last 14 days? Yes [] No [x]        3.  Have you tested positive for COVID19 in the last 30 days?   Yes [] No [x]        Education  Pulm knowledge test review      Therapist Notes   Excellent effort. Pt tolerated all exercises well. Vitals stable. Will continue to motivate and educate pt in rehab.    Dr. Espino immediately available as needed.

## 2022-11-09 ENCOUNTER — OFFICE VISIT (OUTPATIENT)
Dept: ORTHOPEDICS | Facility: CLINIC | Age: 82
End: 2022-11-09
Payer: MEDICARE

## 2022-11-09 ENCOUNTER — OFFICE VISIT (OUTPATIENT)
Dept: UROLOGY | Facility: CLINIC | Age: 82
End: 2022-11-09
Payer: MEDICARE

## 2022-11-09 ENCOUNTER — HOSPITAL ENCOUNTER (OUTPATIENT)
Dept: RADIOLOGY | Facility: HOSPITAL | Age: 82
Discharge: HOME | End: 2022-11-09
Attending: NURSE PRACTITIONER
Payer: MEDICARE

## 2022-11-09 VITALS — BODY MASS INDEX: 27.1 KG/M2 | WEIGHT: 218 LBS | HEIGHT: 75 IN

## 2022-11-09 VITALS
TEMPERATURE: 97 F | RESPIRATION RATE: 18 BRPM | SYSTOLIC BLOOD PRESSURE: 131 MMHG | HEART RATE: 69 BPM | HEIGHT: 75 IN | WEIGHT: 218.69 LBS | DIASTOLIC BLOOD PRESSURE: 67 MMHG | BODY MASS INDEX: 27.19 KG/M2

## 2022-11-09 DIAGNOSIS — N52.9 ERECTILE DYSFUNCTION, UNSPECIFIED ERECTILE DYSFUNCTION TYPE: ICD-10-CM

## 2022-11-09 DIAGNOSIS — N28.1 RENAL CYST: ICD-10-CM

## 2022-11-09 DIAGNOSIS — N28.1 RENAL CYST: Primary | ICD-10-CM

## 2022-11-09 DIAGNOSIS — Z12.5 PROSTATE CANCER SCREENING: Primary | ICD-10-CM

## 2022-11-09 DIAGNOSIS — M17.11 PRIMARY OSTEOARTHRITIS OF RIGHT KNEE: Primary | ICD-10-CM

## 2022-11-09 DIAGNOSIS — I10 HYPERTENSION, UNSPECIFIED TYPE: ICD-10-CM

## 2022-11-09 DIAGNOSIS — N32.81 OAB (OVERACTIVE BLADDER): ICD-10-CM

## 2022-11-09 PROCEDURE — 1159F MED LIST DOCD IN RCRD: CPT | Mod: CPTII,S$GLB,, | Performed by: NURSE PRACTITIONER

## 2022-11-09 PROCEDURE — 1101F PR PT FALLS ASSESS DOC 0-1 FALLS W/OUT INJ PAST YR: ICD-10-PCS | Mod: CPTII,S$GLB,, | Performed by: NURSE PRACTITIONER

## 2022-11-09 PROCEDURE — 3072F LOW RISK FOR RETINOPATHY: CPT | Mod: CPTII,S$GLB,, | Performed by: NURSE PRACTITIONER

## 2022-11-09 PROCEDURE — 1125F PR PAIN SEVERITY QUANTIFIED, PAIN PRESENT: ICD-10-PCS | Mod: CPTII,S$GLB,, | Performed by: STUDENT IN AN ORGANIZED HEALTH CARE EDUCATION/TRAINING PROGRAM

## 2022-11-09 PROCEDURE — 3288F PR FALLS RISK ASSESSMENT DOCUMENTED: ICD-10-PCS | Mod: CPTII,S$GLB,, | Performed by: NURSE PRACTITIONER

## 2022-11-09 PROCEDURE — 3072F PR LOW RISK FOR RETINOPATHY: ICD-10-PCS | Mod: CPTII,S$GLB,, | Performed by: NURSE PRACTITIONER

## 2022-11-09 PROCEDURE — 99214 OFFICE O/P EST MOD 30 MIN: CPT | Mod: 25,S$GLB,, | Performed by: STUDENT IN AN ORGANIZED HEALTH CARE EDUCATION/TRAINING PROGRAM

## 2022-11-09 PROCEDURE — 20610 LARGE JOINT ASPIRATION/INJECTION: R KNEE: ICD-10-PCS | Mod: RT,S$GLB,, | Performed by: STUDENT IN AN ORGANIZED HEALTH CARE EDUCATION/TRAINING PROGRAM

## 2022-11-09 PROCEDURE — 99999 PR PBB SHADOW E&M-EST. PATIENT-LVL V: CPT | Mod: PBBFAC,,, | Performed by: NURSE PRACTITIONER

## 2022-11-09 PROCEDURE — 3075F SYST BP GE 130 - 139MM HG: CPT | Mod: CPTII,S$GLB,, | Performed by: NURSE PRACTITIONER

## 2022-11-09 PROCEDURE — 99999 PR PBB SHADOW E&M-EST. PATIENT-LVL IV: CPT | Mod: PBBFAC,,, | Performed by: STUDENT IN AN ORGANIZED HEALTH CARE EDUCATION/TRAINING PROGRAM

## 2022-11-09 PROCEDURE — 1101F PT FALLS ASSESS-DOCD LE1/YR: CPT | Mod: CPTII,S$GLB,, | Performed by: NURSE PRACTITIONER

## 2022-11-09 PROCEDURE — 20610 DRAIN/INJ JOINT/BURSA W/O US: CPT | Mod: RT,S$GLB,, | Performed by: STUDENT IN AN ORGANIZED HEALTH CARE EDUCATION/TRAINING PROGRAM

## 2022-11-09 PROCEDURE — 99214 PR OFFICE/OUTPT VISIT, EST, LEVL IV, 30-39 MIN: ICD-10-PCS | Mod: S$GLB,,, | Performed by: NURSE PRACTITIONER

## 2022-11-09 PROCEDURE — 1159F MED LIST DOCD IN RCRD: CPT | Mod: CPTII,S$GLB,, | Performed by: STUDENT IN AN ORGANIZED HEALTH CARE EDUCATION/TRAINING PROGRAM

## 2022-11-09 PROCEDURE — 1125F AMNT PAIN NOTED PAIN PRSNT: CPT | Mod: CPTII,S$GLB,, | Performed by: STUDENT IN AN ORGANIZED HEALTH CARE EDUCATION/TRAINING PROGRAM

## 2022-11-09 PROCEDURE — 99999 PR PBB SHADOW E&M-EST. PATIENT-LVL V: ICD-10-PCS | Mod: PBBFAC,,, | Performed by: NURSE PRACTITIONER

## 2022-11-09 PROCEDURE — 3288F FALL RISK ASSESSMENT DOCD: CPT | Mod: CPTII,S$GLB,, | Performed by: NURSE PRACTITIONER

## 2022-11-09 PROCEDURE — 1160F PR REVIEW ALL MEDS BY PRESCRIBER/CLIN PHARMACIST DOCUMENTED: ICD-10-PCS | Mod: CPTII,S$GLB,, | Performed by: STUDENT IN AN ORGANIZED HEALTH CARE EDUCATION/TRAINING PROGRAM

## 2022-11-09 PROCEDURE — 1126F AMNT PAIN NOTED NONE PRSNT: CPT | Mod: CPTII,S$GLB,, | Performed by: NURSE PRACTITIONER

## 2022-11-09 PROCEDURE — 1159F PR MEDICATION LIST DOCUMENTED IN MEDICAL RECORD: ICD-10-PCS | Mod: CPTII,S$GLB,, | Performed by: STUDENT IN AN ORGANIZED HEALTH CARE EDUCATION/TRAINING PROGRAM

## 2022-11-09 PROCEDURE — 99214 OFFICE O/P EST MOD 30 MIN: CPT | Mod: S$GLB,,, | Performed by: NURSE PRACTITIONER

## 2022-11-09 PROCEDURE — 1101F PR PT FALLS ASSESS DOC 0-1 FALLS W/OUT INJ PAST YR: ICD-10-PCS | Mod: CPTII,S$GLB,, | Performed by: STUDENT IN AN ORGANIZED HEALTH CARE EDUCATION/TRAINING PROGRAM

## 2022-11-09 PROCEDURE — 3288F PR FALLS RISK ASSESSMENT DOCUMENTED: ICD-10-PCS | Mod: CPTII,S$GLB,, | Performed by: STUDENT IN AN ORGANIZED HEALTH CARE EDUCATION/TRAINING PROGRAM

## 2022-11-09 PROCEDURE — 76770 US RETROPERITONEAL COMPLETE: ICD-10-PCS | Mod: 26,,, | Performed by: RADIOLOGY

## 2022-11-09 PROCEDURE — 1159F PR MEDICATION LIST DOCUMENTED IN MEDICAL RECORD: ICD-10-PCS | Mod: CPTII,S$GLB,, | Performed by: NURSE PRACTITIONER

## 2022-11-09 PROCEDURE — 99999 PR PBB SHADOW E&M-EST. PATIENT-LVL IV: ICD-10-PCS | Mod: PBBFAC,,, | Performed by: STUDENT IN AN ORGANIZED HEALTH CARE EDUCATION/TRAINING PROGRAM

## 2022-11-09 PROCEDURE — 1101F PT FALLS ASSESS-DOCD LE1/YR: CPT | Mod: CPTII,S$GLB,, | Performed by: STUDENT IN AN ORGANIZED HEALTH CARE EDUCATION/TRAINING PROGRAM

## 2022-11-09 PROCEDURE — 3075F PR MOST RECENT SYSTOLIC BLOOD PRESS GE 130-139MM HG: ICD-10-PCS | Mod: CPTII,S$GLB,, | Performed by: NURSE PRACTITIONER

## 2022-11-09 PROCEDURE — 76770 US EXAM ABDO BACK WALL COMP: CPT | Mod: 26,,, | Performed by: RADIOLOGY

## 2022-11-09 PROCEDURE — 3288F FALL RISK ASSESSMENT DOCD: CPT | Mod: CPTII,S$GLB,, | Performed by: STUDENT IN AN ORGANIZED HEALTH CARE EDUCATION/TRAINING PROGRAM

## 2022-11-09 PROCEDURE — 76770 US EXAM ABDO BACK WALL COMP: CPT | Mod: TC

## 2022-11-09 PROCEDURE — 3078F PR MOST RECENT DIASTOLIC BLOOD PRESSURE < 80 MM HG: ICD-10-PCS | Mod: CPTII,S$GLB,, | Performed by: NURSE PRACTITIONER

## 2022-11-09 PROCEDURE — 3072F LOW RISK FOR RETINOPATHY: CPT | Mod: CPTII,S$GLB,, | Performed by: STUDENT IN AN ORGANIZED HEALTH CARE EDUCATION/TRAINING PROGRAM

## 2022-11-09 PROCEDURE — 99214 PR OFFICE/OUTPT VISIT, EST, LEVL IV, 30-39 MIN: ICD-10-PCS | Mod: 25,S$GLB,, | Performed by: STUDENT IN AN ORGANIZED HEALTH CARE EDUCATION/TRAINING PROGRAM

## 2022-11-09 PROCEDURE — 3072F PR LOW RISK FOR RETINOPATHY: ICD-10-PCS | Mod: CPTII,S$GLB,, | Performed by: STUDENT IN AN ORGANIZED HEALTH CARE EDUCATION/TRAINING PROGRAM

## 2022-11-09 PROCEDURE — 1126F PR PAIN SEVERITY QUANTIFIED, NO PAIN PRESENT: ICD-10-PCS | Mod: CPTII,S$GLB,, | Performed by: NURSE PRACTITIONER

## 2022-11-09 PROCEDURE — 1160F RVW MEDS BY RX/DR IN RCRD: CPT | Mod: CPTII,S$GLB,, | Performed by: STUDENT IN AN ORGANIZED HEALTH CARE EDUCATION/TRAINING PROGRAM

## 2022-11-09 PROCEDURE — 3078F DIAST BP <80 MM HG: CPT | Mod: CPTII,S$GLB,, | Performed by: NURSE PRACTITIONER

## 2022-11-09 RX ORDER — TAMSULOSIN HYDROCHLORIDE 0.4 MG/1
1 CAPSULE ORAL DAILY
Qty: 90 CAPSULE | Refills: 3 | Status: SHIPPED | OUTPATIENT
Start: 2022-11-09 | End: 2023-02-16

## 2022-11-09 RX ORDER — PAPAVERINE HYDROCHLORIDE 30 MG/ML
INJECTION INTRAMUSCULAR; INTRAVENOUS
Qty: 10 ML | Refills: 5 | Status: SHIPPED | OUTPATIENT
Start: 2022-11-09 | End: 2023-12-18

## 2022-11-09 RX ORDER — TRIAMCINOLONE ACETONIDE 40 MG/ML
40 INJECTION, SUSPENSION INTRA-ARTICULAR; INTRAMUSCULAR
Status: DISCONTINUED | OUTPATIENT
Start: 2022-11-09 | End: 2022-11-09 | Stop reason: HOSPADM

## 2022-11-09 RX ADMIN — TRIAMCINOLONE ACETONIDE 40 MG: 40 INJECTION, SUSPENSION INTRA-ARTICULAR; INTRAMUSCULAR at 02:11

## 2022-11-09 NOTE — PROGRESS NOTES
Orthopaedic Follow-Up Visit    Last Appointment: 11/10/21  Diagnosis: Right knee mild osteoarthritis, quad and core weakness  Prior Procedure: Right knee CSI and PT    Srinath Mcknight is a 81 y.o. male who is here for f/u evaluation of his right knee. The patient was last seen here by me on 11/10/21 at which point we decided to proceed with right knee corticosteroid injection and send him to physical therapy for quad and core strengthening prior to considering further treatment options. The patient returns today reporting that the symptoms have started coming back after almost 10 months of relief from the CSI injection in November 2021 and is interested in proceeding with another injection options.     To review his history, Srinath Mcknight is a 81 y.o. year old male patient presented with pain and dysfunction involving the right knee that began approximately 3 years ago with no specific injury but reported a gradual onset of symptoms. His symptoms included pain in the knee , localized medial , pain quality is intermittent aching.  He also reports giving way episodes that can lead to falls. His pain was aggravated by lying down at night and walking. XR showed mild knee arthritis. He had tried rest and topical ointments. We proceeded with right knee corticosteroid injection and referral to physical therapy.      Patient's medications, allergies, past medical, surgical, social and family histories were reviewed and updated as appropriate.    Review of Systems   All systems reviewed were negative.  Specifically, the patient denies fever, chills, weight loss, chest pain, shortness of breath, or dyspnea on exertion.      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 Skin is intact. Neuro is normal with no focal motor or sensory findings.    Standing exam  stance: normal alignment, no significant leg-length discrepancy  gait: obvious limp and he uses a cane for stability      Knee      RIGHT  LEFT  Skin:     Intact   Intact  ROM:     0-130  0-130  Effusion:    Neg   Neg  Medial joint line tenderness:  +   Neg  Lateral joint line tenderness:  Neg   Neg  Sincere:     Neg   Neg  Patella crepitus:   +   Neg  Patella tenderness:  +   Neg  Patella grind:      Neg   Neg  Valgus stress:    Neg   Neg  Varus stress:    Neg   Neg  Posterior drawer:   Neg   Neg  N-V               intact  intact  Hip:    nml    nml   Lower extremity edema: Negative negative    Neurovascular exam  - motor function grossly intact bilaterally to hip flexion, knee extension and flexion, ankle dorsiflexion and plantarflexion  - sensation intact to light touch bilaterally to femoral, tibial, tibial and peroneal distributions  - symmetrical pedal pulses    Imaging:   XR Results:  Results for orders placed during the hospital encounter of 05/11/21    X-ray Knee Ortho Bilateral with Flexion    Narrative  EXAMINATION:  XR KNEE ORTHO BILAT WITH FLEXION    CLINICAL HISTORY:  Pain in right knee    TECHNIQUE:  AP standing of both knees, PA flexion standing views of both knees, and Merchant views of both knees were performed.  Lateral views of  both knees were also performed.    COMPARISON:  December 14, 2017    FINDINGS:  Bilateral tricompartment degenerative changes.  There is increased narrowing medial compartments.  No fracture, dislocation or effusions.  Soft tissues within normal limits.    Impression  Bilateral degenerative change without fracture or dislocation.      Electronically signed by: Ismael Brown MD  Date:    05/11/2021  Time:    14:53      MRI Results:  No results found for this or any previous visit.      CT Results:  No results found for this or any previous visit.      Physician Read: I agree with the above impression.    Assessment/Plan:   Assessment:  Srinath Mcknight is a 81 y.o. male with right knee mild osteoarthritis     Plan:      Discussed diagnosis and treatment options with him today.  He has right knee osteoarthritis that responded well to corticosteroid injection 1 year ago.  Over last couple months his symptoms have returned.  I recommend proceeding with right knee intra-articular corticosteroid injection. The patient is in agreement with this plan.   Right knee intra-articular corticosteroid injection performed today. Patient tolerated the procedure well with no immediate complications.   Follow-up with me as needed        Shan Winters MD    I, Dom Garcia, acted as a scribe for Shan Winters MD for the duration of this office visit.

## 2022-11-09 NOTE — PROGRESS NOTES
Chief Complaint:   Prostate cancer screening  Erectile dysfunction  Renal cysts  BPH    HPI:   Patient is a 81-year-old male that is presenting for his annual exam.  Patient is currently on tamsulosin, reports all BPH symptoms are resolved.  Urine in clinic is negative.  PVR is 14 mL.  Recent PSA was normal, 0.77.  No past elevated PSAs or prostate biopsies.  Patient has erectile dysfunction and reports that TriMix is working well for him.  Patient has been followed for a right renal cyst.  Had renal ultrasound today, results are pending.  Denies gross hematuria or flank pain.  11/09/2021  Patient is a 80-year-old male that is presenting to review renal ultrasound.  Patient has a history of benign, simple renal cyst.  Renal ultrasound was stable, no change to right kidney upper pole cyst measuring 1.4 cm.  No hydronephrosis or nephrolithiasis.  Patient is due for PSA level.   Wants to discuss possible treatments for ED. No BPH meds. Nocturia once nightly, no daytime LUTS.  Allergies:  Sulfa (sulfonamide antibiotics)    Medications:  has a current medication list which includes the following prescription(s): albuterol, allopurinol, bisacodyl, blood glucose control, low, blood sugar diagnostic, blood-glucose meter, cetirizine, clonazepam, cyanocobalamin (vitamin b-12), ferrous sulfate, fluzone highdose quad 21-22 pf, gabapentin, garlic extract, jardiance, lancets, lexiscan, losartan, metformin, metoprolol succinate, moxifloxacin, multivitamin, oxycodone-acetaminophen, pravastatin, prednisolone acetate, sildenafil, aspirin, diclofenac sodium, papaverine, and tamsulosin.    Review of Systems:  General: No fever, chills, fatigability, or weight loss.  Skin: No rashes, itching, or changes in color or texture of skin.  Chest: Denies DAMON, cyanosis, wheezing, cough, and sputum production.  Abdomen: Appetite fine. No weight loss. Denies diarrhea, abdominal pain, hematemesis, or blood in stool.  Musculoskeletal: No joint  stiffness or swelling. Denies back pain.  : As above.  All other review of systems negative.    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    PSH:   has a past surgical history that includes Rotator cuff repair (Left, 2006); Shoulder arthroscopy w/ rotator cuff repair (Right, 2009); Laminectomy (07/22/2016); Hernia repair (Bilateral, 04/18/2017); Joint replacement (Left, 10/09/2017); Back surgery (2019); lumbar foraminotomy (03/2018); left elbow (10/2017); Revision total hip arthroplasty (Left, 9/11/2018); Esophagogastroduodenoscopy (N/A, 6/30/2020); and Colonoscopy (N/A, 6/30/2020).    FamHx: family history includes Cancer (age of onset: 50) in his brother; Diabetes in his father, mother, and sister; Drug abuse in his brother; Heart disease in his father and mother; Hypertension in his father and mother; Stroke in his father.    SocHx:  reports that he has never smoked. He has never used smokeless tobacco. He reports current alcohol use of about 1.0 standard drink per week. He reports that he does not use drugs.      Physical Exam:  Vitals:    11/09/22 0839   BP: 131/67   Pulse: 69   Resp: 18   Temp: 97.2 °F (36.2 °C)     General: A&Ox3, no apparent distress, no deformities  Neck: No masses, normal thyroid  Lungs: normal inspiration, no use of accessory muscles  Heart: normal pulse, no  arrhythmias  Abdomen: Soft, NT, ND, no masses, no hernias, no hepatosplenomegaly  Lymphatic: Neck and groin nodes negative  Skin: The skin is warm and dry. No jaundice.  Ext: No c/c/e.  : Test desc digna, no abnormalities of epididymus. Penis uncircumcised with normal penile and scrotal skin. Meatus normal. Normal rectal tone, no hemorrhoids. Prost 35 gm no nodules or masses appreciated. SV not palpable. Perineum and anus normal.    Labs/Studies:   See HPI   Latest Reference Range & Units 12/13/10 10:28 01/17/13 07:59 01/22/14 09:37 01/21/15 08:55 02/23/16 08:27 11/28/18 08:17 06/12/19 10:19 11/04/20 09:39 11/09/21 10:06 11/07/22 10:05   PSA, Screen 0.00 - 4.00 ng/mL 0.46 0.64 0.46 0.44 0.99 0.60 0.53 0.62 0.83 0.77     Impression/Plan:   1. Prostate cancer screening  Exam is unremarkable and PSA level is normal.  We will see him back in 12 months with repeat PSA for ILEANA.    2. BPH  Patient has a complete resolution to BPH symptoms with current dose of tamsulosin.  Refill was sent to his pharmacy.    3. Erectile dysfunction  Patient given a prescription for TriMix    4. Renal cyst  Renal ultrasound results are pending, I will contact patient with final renal ultrasound results.

## 2022-11-09 NOTE — PROCEDURES
Large Joint Aspiration/Injection: R knee    Date/Time: 11/9/2022 2:15 PM  Performed by: Shan Winters MD  Authorized by: Shan Winters MD     Consent Done?:  Yes (Verbal)  Indications:  Pain  Site marked: the procedure site was marked    Timeout: prior to procedure the correct patient, procedure, and site was verified    Prep: patient was prepped and draped in usual sterile fashion      Local anesthesia used?: Yes    Anesthesia:  Local infiltration  Local anesthetic:  Lidocaine 1% without epinephrine    Details:  Needle Size:  22 G  Ultrasonic Guidance for needle placement?: No    Approach:  Posterior  Location:  Knee  Site:  R knee  Medications:  40 mg triamcinolone acetonide 40 mg/mL  Patient tolerance:  Patient tolerated the procedure well with no immediate complications

## 2022-11-10 ENCOUNTER — HOSPITAL ENCOUNTER (OUTPATIENT)
Dept: PULMONOLOGY | Facility: HOSPITAL | Age: 82
Discharge: HOME | End: 2022-11-10
Attending: INTERNAL MEDICINE
Payer: MEDICARE

## 2022-11-10 VITALS — WEIGHT: 219.69 LBS | BODY MASS INDEX: 27.46 KG/M2

## 2022-11-10 PROCEDURE — G0239 OTH RESP PROC, GROUP: HCPCS

## 2022-11-10 NOTE — PROGRESS NOTES
[facility]  Pulmonary Rehab  Session Summary    SUMMARY     Session Data  Session Number: 29  Time In: 1100  Time Out: 1200  Weight: 99.7 kg (219 lb 11.2 oz)  Target Heart Rate Zone: Minimum: 85 bpm  Target Heart Rate Zone: Maximum: 113 bpm  Patient Motivation: Excellent  Patient Effort: Excellent      Pre Exercise Vitals  SpO2: 100 %  Supplemental O2?: No  Pulse: 86  BP: 144/67  Bev Dyspnea Rating : 1      During Exercise Vitals  SpO2: 98 %  Supplemental O2?: No  Pulse: 89  BP: 128/73  Bev Dyspnea Rating : 1      Post Exercise Vitals  SpO2: 98 %  Supplemental O2?: No  Pulse: 96  BP: 128/63  Bev Dyspnea Rating : 2       Modality  Modality: Arm Ergometer, Hand Free Weights, Nustep      Arm Ergometer  Time: 12 minutes (1.61 miles)  Level: 3.5 (2.8 mets)      Nustep  Time: 25 minutes  Steps: 1800  Load: 6  Mets: 3.1      Biceps  lbs: 6 lbs (dumbbells)  Sets: 2  Reps: 10      Chest  lbs: 6 lbs (dumbbells)  Sets: 2  Reps: 10    Pulmonary Rehab COVID19 Screening Checklist    1. Are you having any of the following physical symptoms?   Yes [] No [x]     Fever or chills  Unexplained muscle or body aches  Cough  Shortness of breath or difficulty breathing  Fatigue  Headache  New loss of taste or smell  Sore throat  Congestion or runny nose  Nausea or vomiting  Diarrhea      2.  Have you come in close contact with anyone with the above symptoms or with known COVID19 in the last 14 days? Yes [] No [x]        3.  Have you tested positive for COVID19 in the last 30 days?   Yes [] No [x]        Education  Avoiding allergens and irritants      Therapist Notes   Excellent effort. Increased to level 3.5 on arm ergometer for 12 mins. Pt tolerated this change and all exercises well. Will continue to motivate and educate pt in rehab.    Dr. Wu immediately available as needed.

## 2022-11-14 ENCOUNTER — HOSPITAL ENCOUNTER (OUTPATIENT)
Dept: RADIOLOGY | Facility: HOSPITAL | Age: 82
Discharge: HOME | End: 2022-11-14
Attending: INTERNAL MEDICINE
Payer: MEDICARE

## 2022-11-14 ENCOUNTER — OFFICE VISIT (OUTPATIENT)
Dept: INTERNAL MEDICINE | Facility: CLINIC | Age: 82
End: 2022-11-14
Payer: MEDICARE

## 2022-11-14 VITALS
WEIGHT: 221.81 LBS | DIASTOLIC BLOOD PRESSURE: 78 MMHG | HEART RATE: 93 BPM | SYSTOLIC BLOOD PRESSURE: 124 MMHG | OXYGEN SATURATION: 98 % | HEIGHT: 75 IN | TEMPERATURE: 96 F | BODY MASS INDEX: 27.58 KG/M2

## 2022-11-14 DIAGNOSIS — G89.29 CHRONIC LEFT-SIDED THORACIC BACK PAIN: ICD-10-CM

## 2022-11-14 DIAGNOSIS — L29.9 PRURITUS: ICD-10-CM

## 2022-11-14 DIAGNOSIS — M54.6 CHRONIC LEFT-SIDED THORACIC BACK PAIN: ICD-10-CM

## 2022-11-14 DIAGNOSIS — E11.59 HYPERTENSION ASSOCIATED WITH DIABETES: ICD-10-CM

## 2022-11-14 DIAGNOSIS — E11.51 TYPE 2 DIABETES MELLITUS WITH DIABETIC PERIPHERAL ANGIOPATHY WITHOUT GANGRENE, WITHOUT LONG-TERM CURRENT USE OF INSULIN: ICD-10-CM

## 2022-11-14 DIAGNOSIS — D57.3 SICKLE CELL TRAIT: ICD-10-CM

## 2022-11-14 DIAGNOSIS — I15.2 HYPERTENSION ASSOCIATED WITH DIABETES: ICD-10-CM

## 2022-11-14 PROCEDURE — 1159F PR MEDICATION LIST DOCUMENTED IN MEDICAL RECORD: ICD-10-PCS | Mod: CPTII,S$GLB,, | Performed by: INTERNAL MEDICINE

## 2022-11-14 PROCEDURE — 99999 PR PBB SHADOW E&M-EST. PATIENT-LVL V: CPT | Mod: PBBFAC,,, | Performed by: INTERNAL MEDICINE

## 2022-11-14 PROCEDURE — 72100 X-RAY EXAM L-S SPINE 2/3 VWS: CPT | Mod: TC,FY,PO

## 2022-11-14 PROCEDURE — 1159F MED LIST DOCD IN RCRD: CPT | Mod: CPTII,S$GLB,, | Performed by: INTERNAL MEDICINE

## 2022-11-14 PROCEDURE — 1160F RVW MEDS BY RX/DR IN RCRD: CPT | Mod: CPTII,S$GLB,, | Performed by: INTERNAL MEDICINE

## 2022-11-14 PROCEDURE — 99214 PR OFFICE/OUTPT VISIT, EST, LEVL IV, 30-39 MIN: ICD-10-PCS | Mod: S$GLB,,, | Performed by: INTERNAL MEDICINE

## 2022-11-14 PROCEDURE — 72070 X-RAY EXAM THORAC SPINE 2VWS: CPT | Mod: 26,,, | Performed by: RADIOLOGY

## 2022-11-14 PROCEDURE — 3288F PR FALLS RISK ASSESSMENT DOCUMENTED: ICD-10-PCS | Mod: CPTII,S$GLB,, | Performed by: INTERNAL MEDICINE

## 2022-11-14 PROCEDURE — 99214 OFFICE O/P EST MOD 30 MIN: CPT | Mod: S$GLB,,, | Performed by: INTERNAL MEDICINE

## 2022-11-14 PROCEDURE — 1101F PT FALLS ASSESS-DOCD LE1/YR: CPT | Mod: CPTII,S$GLB,, | Performed by: INTERNAL MEDICINE

## 2022-11-14 PROCEDURE — 1160F PR REVIEW ALL MEDS BY PRESCRIBER/CLIN PHARMACIST DOCUMENTED: ICD-10-PCS | Mod: CPTII,S$GLB,, | Performed by: INTERNAL MEDICINE

## 2022-11-14 PROCEDURE — 72100 X-RAY EXAM L-S SPINE 2/3 VWS: CPT | Mod: 26,,, | Performed by: RADIOLOGY

## 2022-11-14 PROCEDURE — 1101F PR PT FALLS ASSESS DOC 0-1 FALLS W/OUT INJ PAST YR: ICD-10-PCS | Mod: CPTII,S$GLB,, | Performed by: INTERNAL MEDICINE

## 2022-11-14 PROCEDURE — 72100 XR LUMBAR SPINE AP AND LATERAL: ICD-10-PCS | Mod: 26,,, | Performed by: RADIOLOGY

## 2022-11-14 PROCEDURE — 72070 X-RAY EXAM THORAC SPINE 2VWS: CPT | Mod: TC,FY,PO

## 2022-11-14 PROCEDURE — 3078F DIAST BP <80 MM HG: CPT | Mod: CPTII,S$GLB,, | Performed by: INTERNAL MEDICINE

## 2022-11-14 PROCEDURE — 72070 XR THORACIC SPINE AP LATERAL: ICD-10-PCS | Mod: 26,,, | Performed by: RADIOLOGY

## 2022-11-14 PROCEDURE — 3288F FALL RISK ASSESSMENT DOCD: CPT | Mod: CPTII,S$GLB,, | Performed by: INTERNAL MEDICINE

## 2022-11-14 PROCEDURE — 3072F PR LOW RISK FOR RETINOPATHY: ICD-10-PCS | Mod: CPTII,S$GLB,, | Performed by: INTERNAL MEDICINE

## 2022-11-14 PROCEDURE — 3074F PR MOST RECENT SYSTOLIC BLOOD PRESSURE < 130 MM HG: ICD-10-PCS | Mod: CPTII,S$GLB,, | Performed by: INTERNAL MEDICINE

## 2022-11-14 PROCEDURE — 3078F PR MOST RECENT DIASTOLIC BLOOD PRESSURE < 80 MM HG: ICD-10-PCS | Mod: CPTII,S$GLB,, | Performed by: INTERNAL MEDICINE

## 2022-11-14 PROCEDURE — 99999 PR PBB SHADOW E&M-EST. PATIENT-LVL V: ICD-10-PCS | Mod: PBBFAC,,, | Performed by: INTERNAL MEDICINE

## 2022-11-14 PROCEDURE — 3072F LOW RISK FOR RETINOPATHY: CPT | Mod: CPTII,S$GLB,, | Performed by: INTERNAL MEDICINE

## 2022-11-14 PROCEDURE — 3074F SYST BP LT 130 MM HG: CPT | Mod: CPTII,S$GLB,, | Performed by: INTERNAL MEDICINE

## 2022-11-14 RX ORDER — LOSARTAN POTASSIUM 50 MG/1
50 TABLET ORAL DAILY
Qty: 90 TABLET | Refills: 3 | Status: SHIPPED | OUTPATIENT
Start: 2022-11-14 | End: 2023-09-18

## 2022-11-14 NOTE — PROGRESS NOTES
Subjective:       Patient ID: Srinath Mcknight is a 81 y.o. male.    Chief Complaint: Follow-up    Follow-up  Pertinent negatives include no chest pain, coughing, fever, nausea or vomiting. Patient is a 81-year-old male presenting today following up on a few things.  First off he had some issues with his blood pressure.  He states he was going to pulmonary rehab and they were noting that his blood pressures running little bit low.  He describes pressure readings in the 110-120 range over 60s.  He was not having symptoms with these pressures but after being told they were running low he went ahead and adjusted his losartan to once daily 50 mg dose.  He is blood pressures have been doing fine since.  He is not had any issues associated with making that change.      His diabetes has been stable.  He has not had recent A1c.  He will be due for 1 in December.  He stops his Jardiance doing due to cost and went back to higher dose on his metformin and he is tolerating that well.  He will get an updated A1c in the near future.      He complains of some itching of the skin of his back.  He is taking some Zyrtec and it seems to have helped but is not completely resolving it.  He is not aware of any rash.    He complains of a chronic left-sided thoracic back pain sort of in the flank region.  It is not really very tender to palpation but feels like it is like a deep discomfort.  He states that other doctors have told him they think it is coming off of his back but he is never had it evaluated.  This has been going on for a very long time at this point.  It is not getting worse but it is not getting better.      Patient has history of sickle cell trait.  He is not had any issues associated with this diagnosis.    Review of Systems   Constitutional:  Negative for fever and unexpected weight change.   Respiratory:  Negative for cough, shortness of breath and wheezing.    Cardiovascular:  Negative for chest pain and palpitations.  "  Gastrointestinal:  Negative for constipation, diarrhea, nausea and vomiting.   Genitourinary:  Negative for dysuria and hematuria.     Objective:   /78   Pulse 93   Temp 96.3 °F (35.7 °C)   Ht 6' 3" (1.905 m)   Wt 100.6 kg (221 lb 12.5 oz)   SpO2 98%   BMI 27.72 kg/m²      Physical Exam  Vitals reviewed.   Constitutional:       Appearance: He is well-developed.   HENT:      Head: Normocephalic and atraumatic.      Right Ear: Tympanic membrane, ear canal and external ear normal.      Left Ear: Tympanic membrane, ear canal and external ear normal.   Eyes:      Pupils: Pupils are equal, round, and reactive to light.   Neck:      Thyroid: No thyromegaly.   Cardiovascular:      Rate and Rhythm: Normal rate and regular rhythm.      Heart sounds: Normal heart sounds. No murmur heard.    No friction rub. No gallop.   Pulmonary:      Effort: Pulmonary effort is normal.      Breath sounds: Normal breath sounds. No wheezing or rales.   Abdominal:      General: Bowel sounds are normal. There is no distension.      Palpations: Abdomen is soft.      Tenderness: There is no abdominal tenderness.   Musculoskeletal:      Cervical back: Neck supple.      Comments: No spinous process tenderness is noted.  There is mild tenderness to palpation in the left flank region but nothing specific and nose specific point that is more tender than others.  Abnormalities on exam as far as subcutaneous nodules or organomegaly.   Skin:     Comments: Examination of the skin of the back reveals no rash or erythema.  Skin is little bit dry.   Psychiatric:         Mood and Affect: Mood normal.       Lab Visit on 11/07/2022   Component Date Value    PSA, Screen 11/07/2022 0.77        Assessment:       1. Hypertension associated with diabetes    2. Type 2 diabetes mellitus with diabetic peripheral angiopathy without gangrene, without long-term current use of insulin    3. Pruritus    4. Chronic left-sided thoracic back pain    5. Sickle cell " trait          Plan:   No problem-specific Assessment & Plan notes found for this encounter.    Hypertension associated with diabetes  Comments:  Continue losartan at current dose, stable.  Orders:  -     losartan (COZAAR) 50 MG tablet; Take 1 tablet (50 mg total) by mouth once daily.  Dispense: 90 tablet; Refill: 3    Type 2 diabetes mellitus with diabetic peripheral angiopathy without gangrene, without long-term current use of insulin  Comments:  No longer taking jardiance due to cost.  A1c in December.  Orders:  -     Hemoglobin A1C; Future; Expected date: 12/14/2022  -     Microalbumin/Creatinine Ratio, Urine    Pruritus  Comments:  Improved some with zyrtec but persisting.  Consult derm.  Orders:  -     Ambulatory referral/consult to Dermatology; Future; Expected date: 11/21/2022    Chronic left-sided thoracic back pain  Comments:  t spine, l spine, tylenol 1000 mg three times a day  Orders:  -     X-Ray Thoracic Spine AP Lateral; Future; Expected date: 11/14/2022  -     X-Ray Lumbar Spine AP And Lateral; Future; Expected date: 11/14/2022    Sickle cell trait  Comments:  No issues of concern.  Stable.          Follow up if symptoms worsen or fail to improve.

## 2022-11-14 NOTE — PATIENT INSTRUCTIONS
Patient Education       Guide to Eating When You Have Diabetes   About this topic   This guide will help you control your blood sugar along with exercise and the drugs you are taking. You want to have a balanced amount of carbohydrates, fats, and protein in your meal. Following these diet guidelines may also help control your blood pressure and cholesterol.     What will the results be?   When you follow these diet guidelines, your blood sugar may be easier to keep within the goal blood sugar ranges. Ask your doctor for your goal blood sugar ranges.  What changes to diet are needed?   You need to balance how much carbohydrate, fat, and protein is in your meals. You also need to control the amount or portion size of the food you eat. Eat meals at about the same time every day. Do not skip a meal. Talk to your dietitian about making a personal meal plan for you.     Who should use this diet?   This diet is helpful to people with high blood sugar or diabetes.   What foods are good to eat?   Whole grains like:  1/3 cup (80 grams) brown rice  1/3 cup (80 grams) wild rice  1/3 cup (80 grams) whole wheat pasta  1 slice whole wheat or whole grain bread  3/4 cup (180 grams) high-fiber cereal  1/2 cup (120 grams) cooked oatmeal  1/2 (120 grams) English muffin  Fruits and vegetables like:  1/2 cup (120 grams) sweet potatoes  1/2 cup (120 grams) cooked vegetables, like squash, green beans, cauliflower, carrots, and cabbage  1 cup (240 grams) raw vegetables or salad greens  1 small apples or oranges  1/2 cup (120 grams) unsweetened fruit juice  Proteins like:  1 ounce (30 grams) lean beef or pork  1 ounce (30 grams) chicken, skin removed  1 ounce (30 grams) turkey, skin removed  1 ounce (30 grams) fish  1 ounce (30 grams) low-fat cheese or lunch meat  1/2 cup (120 grams) cooked beans ? black, kidney, chickpeas, or lentils  1 whole egg  What foods should be limited or avoided?   High fat or processed foods  like:  Moya  Sausage  Hot dogs  Processed snacks  Fats and oils like:  Margarine  Salad dressings  Foods that are high in salt like:  Table salt  Salted breads, rolls, crackers  Commercially-prepared potatoes and vegetable mixes  Canned vegetables and juices  Canned soups  Smoked, cured, or salted meats  Starches that are not whole grain like:  White rice  White potatoes  French fries  Pasta  White bread  Sugary cereals  Instant oatmeal  Baked goods, pastries  Croissants  What can be done to prevent this health problem?   Type 1 diabetes is a lifelong problem and you cannot prevent it. Type 2 diabetes can be prevented or delayed with lifestyle changes. You can still lead a normal life. Diabetes can be managed through diet, exercise, and drugs. Family members and friends can help you practice good health behaviors.  When do I need to call the doctor?   Blood sugar level is above 240 for more than a day  Blood sugar level drops to less than 40  Abnormal urine test results  Trouble breathing  Very sleepy  Throw up more than once  Many loose stools  Questions about your diet plan  Helpful tips   Try to eat smaller portions of a healthy balanced diet throughout the day. Take time to plan your meals and snacks.  Where can I learn more?   American Diabetes Association  https://www.cdc.gov/diabetes/managing/eat-well/meal-plan-method.html   HelpGuide.org  http://www.helpguide.org/home-pages/healthy-eating.htm   HelpGuide.org  http://www.helpguide.org/articles/diet-weight-loss/diabetes-diet-and-food-tips.htm   Last Reviewed Date   2021-07-21  Consumer Information Use and Disclaimer   This information is not specific medical advice and does not replace information you receive from your health care provider. This is only a brief summary of general information. It does NOT include all information about conditions, illnesses, injuries, tests, procedures, treatments, therapies, discharge instructions or life-style choices that  may apply to you. You must talk with your health care provider for complete information about your health and treatment options. This information should not be used to decide whether or not to accept your health care providers advice, instructions or recommendations. Only your health care provider has the knowledge and training to provide advice that is right for you.   Copyright   Copyright © 2021 [employer] and its affiliates and/or licensors. All rights reserved.

## 2022-11-15 ENCOUNTER — TELEPHONE (OUTPATIENT)
Dept: INTERNAL MEDICINE | Facility: CLINIC | Age: 82
End: 2022-11-15
Payer: MEDICARE

## 2022-11-15 ENCOUNTER — HOSPITAL ENCOUNTER (OUTPATIENT)
Dept: PULMONOLOGY | Facility: HOSPITAL | Age: 82
Discharge: HOME | End: 2022-11-15
Attending: INTERNAL MEDICINE
Payer: MEDICARE

## 2022-11-15 VITALS — BODY MASS INDEX: 27.62 KG/M2 | WEIGHT: 221 LBS

## 2022-11-15 DIAGNOSIS — G89.29 CHRONIC LEFT-SIDED THORACIC BACK PAIN: Primary | ICD-10-CM

## 2022-11-15 DIAGNOSIS — M54.6 CHRONIC LEFT-SIDED THORACIC BACK PAIN: Primary | ICD-10-CM

## 2022-11-15 PROCEDURE — G0239 OTH RESP PROC, GROUP: HCPCS

## 2022-11-15 NOTE — TELEPHONE ENCOUNTER
Xrays of the spine show degenerative disc disease but nothing severe.  Referral to back and spine clinic placed.

## 2022-11-15 NOTE — PROGRESS NOTES
[facility]  Pulmonary Rehab  Session Summary    SUMMARY     Session Data  Session Number: 30  Time In: 1100  Time Out: 1200  Weight: 100.2 kg (221 lb)  Target Heart Rate Zone: Minimum: 85 bpm  Target Heart Rate Zone: Maximum: 113 bpm  Patient Motivation: Excellent  Patient Effort: Excellent      Pre Exercise Vitals  SpO2: 98 %  Supplemental O2?: No  Pulse: 77  BP: 138/73  Bev Dyspnea Rating : 0.5      During Exercise Vitals  SpO2: 99 %  Supplemental O2?: No  Pulse: 81  BP: 132/55  Bev Dyspnea Rating : 2      Post Exercise Vitals  SpO2: 99 %  Supplemental O2?: No  Pulse: 86  BP: 147/74  Bev Dyspnea Rating : 1       Modality  Modality: Arm Ergometer, Hand Free Weights, Nustep      Arm Ergometer  Time: 12 minutes (1.58 miles)  Level: 3.5 (2.6 mets)    Nustep  Time: 25 minutes  Steps: 1832  Load: 6  Mets: 3.2         Biceps  lbs: 6 lbs (dumbbells)  Sets: 2  Reps: 10      Chest  lbs: 6 lbs (dumbbells)  Sets: 2  Reps: 10    Pulmonary Rehab COVID19 Screening Checklist    1. Are you having any of the following physical symptoms?   Yes [] No [x]     Fever or chills  Unexplained muscle or body aches  Cough  Shortness of breath or difficulty breathing  Fatigue  Headache  New loss of taste or smell  Sore throat  Congestion or runny nose  Nausea or vomiting  Diarrhea      2.  Have you come in close contact with anyone with the above symptoms or with known COVID19 in the last 14 days? Yes [] No [x]        3.  Have you tested positive for COVID19 in the last 30 days?   Yes [] No [x]        Education  ABG/PFT/xray      Therapist Notes   Pt tolerated exercises well. No changes to levels or time on equipment. Excellent effort. Will continue to motivate and educate pt in rehab.    Dr. Pereira immediately available as needed.

## 2022-11-16 DIAGNOSIS — G47.52 CONTROLLED REM SLEEP BEHAVIOR DISORDER: ICD-10-CM

## 2022-11-16 RX ORDER — CLONAZEPAM 0.5 MG/1
0.5 TABLET ORAL NIGHTLY
Qty: 30 TABLET | Refills: 2 | Status: SHIPPED | OUTPATIENT
Start: 2022-11-16 | End: 2023-02-10

## 2022-11-17 ENCOUNTER — HOSPITAL ENCOUNTER (OUTPATIENT)
Dept: PULMONOLOGY | Facility: HOSPITAL | Age: 82
Discharge: HOME | End: 2022-11-17
Attending: INTERNAL MEDICINE
Payer: MEDICARE

## 2022-11-17 VITALS — WEIGHT: 213.63 LBS | BODY MASS INDEX: 26.7 KG/M2

## 2022-11-17 PROCEDURE — G0239 OTH RESP PROC, GROUP: HCPCS

## 2022-11-17 NOTE — PROGRESS NOTES
[facility]  Pulmonary Rehab  Session Summary    SUMMARY     Session Data  Session Number: 31  Time In: 1100  Time Out: 1200  Weight: 96.9 kg (213 lb 9.6 oz)  Target Heart Rate Zone: Minimum: 85 bpm  Target Heart Rate Zone: Maximum: 113 bpm  Patient Motivation: Excellent  Patient Effort: Excellent      Pre Exercise Vitals  SpO2: 100 %  Supplemental O2?: No  Pulse: 77  BP: 135/60  Bev Dyspnea Rating : 1      During Exercise Vitals  SpO2: 99 %  Supplemental O2?: No  Pulse: 89  BP: 141/66  Bev Dyspnea Rating : 2      Post Exercise Vitals  SpO2: 100 %  Supplemental O2?: No  Pulse: 96  BP: 144/67  Bev Dyspnea Rating : 2       Modality  Modality: Arm Ergometer, Hand Free Weights, Nustep    Arm Ergometer  Time: 12 minutes (1.59 miles)  Level: 3.5 (2.5 mets)      Nustep  Time: 25 minutes  Steps: 1848  Load: 6  Mets: 3.2         Biceps  lbs: 6 lbs (dumbbells)  Sets: 2  Reps: 10      Chest  lbs: 6 lbs (dumbbells)  Sets: 2  Reps: 10    Pulmonary Rehab COVID19 Screening Checklist    1. Are you having any of the following physical symptoms?   Yes [] No [x]     Fever or chills  Unexplained muscle or body aches  Cough  Shortness of breath or difficulty breathing  Fatigue  Headache  New loss of taste or smell  Sore throat  Congestion or runny nose  Nausea or vomiting  Diarrhea      2.  Have you come in close contact with anyone with the above symptoms or with known COVID19 in the last 14 days? Yes [] No [x]        3.  Have you tested positive for COVID19 in the last 30 days?   Yes [] No [x]        Education  COPD action plan  PFT/ABG/CXR      Therapist Notes   Excellent effort. Pt tolerated all exercises well. Vitals stable.Will continue to motivate and educate pt in rehab.    Dr. Ferrell immediately available as needed.

## 2022-11-18 NOTE — PROGRESS NOTES
[facility]  Pulmonary Rehab  30 Day Evaluation and Recertification    SUMMARY       Summary of Progress: Srinath Mcknight has been doing excellent in rehab. He is increasing his exercise tolerance and will continue to benefit from pulmonary rehab. Pt works hard in his sessions and strives to meet his goals. Pt has knee issues and recently received injections for pain. He is unable to exercise on recumbent bike and treadmill due to this. Pt is dedicated to rehab and rarely misses. Pt participates in educational discussions and is active outside of rehab. Will continue to motivate and educate pt while working to increase strength and endurance in rehab.    Attends:     Patient attends 2 days a week and has completed 31 visits.  The patient's current compliance is 97%.    Referring provider:  Dr. Machuca    Start date:  7/29/22    Problems this Certification Period:   Knee pain    Exercise Capacity Summary    Nustep Date:  10/2/22   Time Load Steps Date:  11/17/22 Time Load Steps     20 6 1463  25 6 1848   Recumbent Bike Date:  9/22/22  (1.49 miles)   Time Level Date:  9/29/22  (0.87 miles) Time Level     10 2  6 2   Treadmill Date:     Time Speed Grade Date:   Time Speed Grade              Arm Ergometer Date:  10/17/22  (1.77 miles)   Time Level Date:  11/10/22  (1.61 miles) Time Level     14 2  12 3.5   Free Weights Date:  10/17/22  (Dumb)   Bicep Curls  Chest Press  Triceps  Legs lbs Sets Reps Date:  11/17/22  (Dumb) Bicep Curls  Chest Press  Triceps  Legs lbs Sets Reps      6 2 10   6 2 10                 Education:  Lessons learned with COPD  Medication mgmt  Maximizing energy  Pursed lip breathing  Proper nutrition  O2/CPAP compliance      Goals:  met    Updated Exercise Prescription:  Endurance Training: Arm ergometer, 14 mins, level 3.5  Strength Training: Nustep, load 7, 10 mins, 500 steps         I certify that the patient is making progress in pulmonary rehabilitation, is physically able to  participate, medically stable and remains motivated.

## 2022-11-22 ENCOUNTER — PATIENT MESSAGE (OUTPATIENT)
Dept: UROLOGY | Facility: CLINIC | Age: 82
End: 2022-11-22
Payer: MEDICARE

## 2022-11-22 ENCOUNTER — HOSPITAL ENCOUNTER (OUTPATIENT)
Dept: PULMONOLOGY | Facility: HOSPITAL | Age: 82
Discharge: HOME | End: 2022-11-22
Attending: INTERNAL MEDICINE
Payer: MEDICARE

## 2022-11-22 VITALS — BODY MASS INDEX: 24.81 KG/M2 | WEIGHT: 198.5 LBS

## 2022-11-22 PROCEDURE — G0239 OTH RESP PROC, GROUP: HCPCS

## 2022-11-22 NOTE — PROGRESS NOTES
[facility]  Pulmonary Rehab  Session Summary    SUMMARY     Session Data  Session Number: 32  Time In: 1100  Time Out: 1200  Weight: 90 kg (198 lb 8 oz)  Target Heart Rate Zone: Minimum: 84 bpm  Target Heart Rate Zone: Maximum: 112 bpm  Patient Motivation: Excellent  Patient Effort: Excellent      Pre Exercise Vitals  SpO2: 99 %  Supplemental O2?: No  Pulse: 76  BP: 125/63  Bev Dyspnea Rating : 1      During Exercise Vitals  SpO2: 100 %  Supplemental O2?: No  Pulse: 97  BP: 126/63  Bev Dyspnea Rating : 1      Post Exercise Vitals  SpO2: 98 %  Supplemental O2?: No  Pulse: 92  BP: 110/61  Bev Dyspnea Rating : 1       Modality  Modality: Arm Ergometer, Hand Free Weights, Nustep    Arm Ergometer  Time: 14 minutes (1.75 miles)  Level: 3.5 (2.1 mets)      Nustep  Time: 25 minutes  Steps: 1808  Load: 7 (see note)  Mets: 3.2         Biceps  lbs: 6 lbs (dumbbells)  Sets: 2  Reps: 10      Chest  lbs: 6 lbs (dumbbells)  Sets: 2  Reps: 10    Pulmonary Rehab COVID19 Screening Checklist    1. Are you having any of the following physical symptoms?   Yes [] No [x]     Fever or chills  Unexplained muscle or body aches  Cough  Shortness of breath or difficulty breathing  Fatigue  Headache  New loss of taste or smell  Sore throat  Congestion or runny nose  Nausea or vomiting  Diarrhea      2.  Have you come in close contact with anyone with the above symptoms or with known COVID19 in the last 14 days? Yes [] No [x]        3.  Have you tested positive for COVID19 in the last 30 days?   Yes [] No [x]        Education  Pulm knowledge test review      Therapist Notes   Excellent effort. Pt met 30 day goals today, exercising for 10 mins on load 7 on Nustep, achieving 667 steps and on arm ergometer for 14 mins on level 3.5. He tolerated all changes and exercises well. Vitals stable. Will continue to motivate and educate pt in rehab.    Dr. Abad immediately available as needed.

## 2022-11-25 ENCOUNTER — TELEPHONE (OUTPATIENT)
Dept: ADMINISTRATIVE | Facility: HOSPITAL | Age: 82
End: 2022-11-25
Payer: MEDICARE

## 2022-11-29 ENCOUNTER — HOSPITAL ENCOUNTER (OUTPATIENT)
Dept: PULMONOLOGY | Facility: HOSPITAL | Age: 82
Discharge: HOME | End: 2022-11-29
Attending: INTERNAL MEDICINE
Payer: MEDICARE

## 2022-11-29 ENCOUNTER — PATIENT MESSAGE (OUTPATIENT)
Dept: PHYSICAL MEDICINE AND REHAB | Facility: CLINIC | Age: 82
End: 2022-11-29
Payer: MEDICARE

## 2022-11-29 ENCOUNTER — TELEPHONE (OUTPATIENT)
Dept: PHYSICAL MEDICINE AND REHAB | Facility: CLINIC | Age: 82
End: 2022-11-29
Payer: MEDICARE

## 2022-11-29 PROCEDURE — G0239 OTH RESP PROC, GROUP: HCPCS

## 2022-11-29 NOTE — TELEPHONE ENCOUNTER
Contacted patient fron Dr. Garcia's schedule. LVM at patient's mobile number and at his spouse's mobile number for call back to reschedule with IPM. Also left message in patient portal.    Gudelia Colby RN

## 2022-11-30 VITALS — BODY MASS INDEX: 27.21 KG/M2 | WEIGHT: 217.69 LBS

## 2022-11-30 NOTE — PROGRESS NOTES
[facility]  Pulmonary Rehab  Session Summary    SUMMARY     Session Data  Session Number: 33  Time In: 1100  Time Out: 1200  Weight: 98.7 kg (217 lb 11.2 oz)  Target Heart Rate Zone: Minimum: 84 bpm  Target Heart Rate Zone: Maximum: 112 bpm  Patient Motivation: Excellent  Patient Effort: Excellent      Pre Exercise Vitals  SpO2: 98 %  Supplemental O2?: No  Pulse: 72  BP: 136/78  Bev Dyspnea Rating : 1      During Exercise Vitals  SpO2: 98 %  Supplemental O2?: No  Pulse: 79  BP: 142/74  Bev Dyspnea Rating : 1      Post Exercise Vitals  SpO2: 98 %  Supplemental O2?: No  Pulse: 89  BP: 145/66  Bev Dyspnea Rating : 2       Modality  Modality: Arm Ergometer, Hand Free Weights, Nustep    Arm Ergometer  Time: 14 minutes (1.78 miles)  Level: 3.5 (2.7 mets)      Nustep  Time: 25 minutes  Steps: 8570  Load: 7 (load 7 10 mins, then changed to load 6 for 15 mins)      Biceps  lbs: 6 lbs (dumbbells)  Sets: 2  Reps: 10      Chest  lbs: 6 lbs (dumbbells)  Sets: 2  Reps: 10    Pulmonary Rehab COVID19 Screening Checklist    1. Are you having any of the following physical symptoms?   Yes [] No [x]     Fever or chills  Unexplained muscle or body aches  Cough  Shortness of breath or difficulty breathing  Fatigue  Headache  New loss of taste or smell  Sore throat  Congestion or runny nose  Nausea or vomiting  Diarrhea      2.  Have you come in close contact with anyone with the above symptoms or with known COVID19 in the last 14 days? Yes [] No [x]        3.  Have you tested positive for COVID19 in the last 30 days?   Yes [] No [x]        Education  Compliance in rehab    Therapist Notes   Excellent effort. Pt tolerated all exercises well. Will continue to motivate and educate pt in rehab.    Dr. Abad immediately available as needed.

## 2022-12-01 ENCOUNTER — HOSPITAL ENCOUNTER (OUTPATIENT)
Dept: PULMONOLOGY | Facility: HOSPITAL | Age: 82
Discharge: HOME | End: 2022-12-01
Attending: INTERNAL MEDICINE
Payer: MEDICARE

## 2022-12-01 VITALS — WEIGHT: 218.69 LBS | BODY MASS INDEX: 27.34 KG/M2

## 2022-12-01 PROCEDURE — G0239 OTH RESP PROC, GROUP: HCPCS

## 2022-12-01 NOTE — PROGRESS NOTES
[facility]  Pulmonary Rehab  Session Summary    SUMMARY     Session Data  Session Number: 34  Time In: 1100  Time Out: 1200  Weight: 99.2 kg (218 lb 11.2 oz)  Target Heart Rate Zone: Minimum: 84 bpm  Target Heart Rate Zone: Maximum: 112 bpm  Patient Motivation: Excellent  Patient Effort: Excellent      Pre Exercise Vitals  SpO2: 100 %  Supplemental O2?: No  Pulse: 70  BP: 133/62  Bev Dyspnea Rating : 1      During Exercise Vitals  SpO2: 100 %  Supplemental O2?: No  Pulse: 83  BP: 148/64  Bev Dyspnea Rating : 1      Post Exercise Vitals  SpO2: 100 %  Supplemental O2?: No  Pulse: 88  BP: 140/63  Bev Dyspnea Rating : 2       Modality  Modality: Arm Ergometer, Hand Free Weights, Nustep      Arm Ergometer  Time: 14 minutes (1.82 miles)  Level: 3.5 (2.6 mets)    Nustep  Time: 25 minutes  Steps: 1781  Load: 7 (load 7 for 10 mins, then changed to load 6 for 15 mins.)  Mets: 3.1         Biceps  lbs: 6 lbs (dumbbells)  Sets: 2  Reps: 1      Chest  lbs: 6 lbs (dumbbells)  Sets: 2  Reps: 10    Pulmonary Rehab COVID19 Screening Checklist    1. Are you having any of the following physical symptoms?   Yes [] No [x]     Fever or chills  Unexplained muscle or body aches  Cough  Shortness of breath or difficulty breathing  Fatigue  Headache  New loss of taste or smell  Sore throat  Congestion or runny nose  Nausea or vomiting  Diarrhea      2.  Have you come in close contact with anyone with the above symptoms or with known COVID19 in the last 14 days? Yes [] No [x]        3.  Have you tested positive for COVID19 in the last 30 days?   Yes [] No [x]        Education  Compliance in rehab  Coping with stress      Therapist Notes   Excellent effort. Pt tolerated all exercises well as charted above. Vitals stable. Will continue to motivate and educate pt in rehab.    Dr. Wu immediately available as needed.

## 2022-12-02 ENCOUNTER — TELEPHONE (OUTPATIENT)
Dept: PAIN MEDICINE | Facility: CLINIC | Age: 82
End: 2022-12-02
Payer: MEDICARE

## 2022-12-02 NOTE — TELEPHONE ENCOUNTER
LM for Pt notifying he is already scheduled with Dr. Brooks and there are no sooner appointments at this time. I have placed his appointment on the waitlist if something sooner becomes available.     RECENT ::

## 2022-12-06 ENCOUNTER — HOSPITAL ENCOUNTER (OUTPATIENT)
Dept: PULMONOLOGY | Facility: HOSPITAL | Age: 82
Discharge: HOME | End: 2022-12-06
Attending: INTERNAL MEDICINE
Payer: MEDICARE

## 2022-12-06 ENCOUNTER — LAB VISIT (OUTPATIENT)
Dept: LAB | Facility: HOSPITAL | Age: 82
End: 2022-12-06
Attending: INTERNAL MEDICINE
Payer: MEDICARE

## 2022-12-06 VITALS — BODY MASS INDEX: 27.34 KG/M2 | WEIGHT: 218.69 LBS

## 2022-12-06 DIAGNOSIS — D64.9 ANEMIA DUE TO UNKNOWN MECHANISM: ICD-10-CM

## 2022-12-06 DIAGNOSIS — R76.8 ELEVATED SERUM IMMUNOGLOBULIN FREE LIGHT CHAIN LEVEL: ICD-10-CM

## 2022-12-06 DIAGNOSIS — D64.9 CHRONIC ANEMIA: ICD-10-CM

## 2022-12-06 DIAGNOSIS — N18.31 STAGE 3A CHRONIC KIDNEY DISEASE: ICD-10-CM

## 2022-12-06 LAB
ALBUMIN SERPL BCP-MCNC: 3.7 G/DL (ref 3.5–5.2)
ALP SERPL-CCNC: 52 U/L (ref 55–135)
ALT SERPL W/O P-5'-P-CCNC: 18 U/L (ref 10–44)
ANION GAP SERPL CALC-SCNC: 12 MMOL/L (ref 8–16)
AST SERPL-CCNC: 26 U/L (ref 10–40)
BASOPHILS # BLD AUTO: 0.03 K/UL (ref 0–0.2)
BASOPHILS NFR BLD: 0.7 % (ref 0–1.9)
BILIRUB SERPL-MCNC: 0.5 MG/DL (ref 0.1–1)
BUN SERPL-MCNC: 17 MG/DL (ref 8–23)
CALCIUM SERPL-MCNC: 8.6 MG/DL (ref 8.7–10.5)
CHLORIDE SERPL-SCNC: 109 MMOL/L (ref 95–110)
CO2 SERPL-SCNC: 23 MMOL/L (ref 23–29)
CREAT SERPL-MCNC: 1.4 MG/DL (ref 0.5–1.4)
DIFFERENTIAL METHOD: ABNORMAL
EOSINOPHIL # BLD AUTO: 0.3 K/UL (ref 0–0.5)
EOSINOPHIL NFR BLD: 7.8 % (ref 0–8)
ERYTHROCYTE [DISTWIDTH] IN BLOOD BY AUTOMATED COUNT: 14.4 % (ref 11.5–14.5)
EST. GFR  (NO RACE VARIABLE): 51 ML/MIN/1.73 M^2
FERRITIN SERPL-MCNC: 63 NG/ML (ref 20–300)
GLUCOSE SERPL-MCNC: 159 MG/DL (ref 70–110)
HCT VFR BLD AUTO: 37.9 % (ref 40–54)
HGB BLD-MCNC: 12.7 G/DL (ref 14–18)
IGA SERPL-MCNC: 122 MG/DL (ref 40–350)
IGG SERPL-MCNC: 1066 MG/DL (ref 650–1600)
IGM SERPL-MCNC: 28 MG/DL (ref 50–300)
IMM GRANULOCYTES # BLD AUTO: 0.01 K/UL (ref 0–0.04)
IMM GRANULOCYTES NFR BLD AUTO: 0.2 % (ref 0–0.5)
IRON SERPL-MCNC: 117 UG/DL (ref 45–160)
LYMPHOCYTES # BLD AUTO: 1.6 K/UL (ref 1–4.8)
LYMPHOCYTES NFR BLD: 38.5 % (ref 18–48)
MCH RBC QN AUTO: 29.5 PG (ref 27–31)
MCHC RBC AUTO-ENTMCNC: 33.5 G/DL (ref 32–36)
MCV RBC AUTO: 88 FL (ref 82–98)
MONOCYTES # BLD AUTO: 0.3 K/UL (ref 0.3–1)
MONOCYTES NFR BLD: 8.3 % (ref 4–15)
NEUTROPHILS # BLD AUTO: 1.8 K/UL (ref 1.8–7.7)
NEUTROPHILS NFR BLD: 44.5 % (ref 38–73)
NRBC BLD-RTO: 0 /100 WBC
PLATELET # BLD AUTO: 157 K/UL (ref 150–450)
PMV BLD AUTO: 9.4 FL (ref 9.2–12.9)
POTASSIUM SERPL-SCNC: 4.3 MMOL/L (ref 3.5–5.1)
PROT SERPL-MCNC: 6.6 G/DL (ref 6–8.4)
RBC # BLD AUTO: 4.3 M/UL (ref 4.6–6.2)
SATURATED IRON: 42 % (ref 20–50)
SODIUM SERPL-SCNC: 144 MMOL/L (ref 136–145)
TOTAL IRON BINDING CAPACITY: 280 UG/DL (ref 250–450)
TRANSFERRIN SERPL-MCNC: 189 MG/DL (ref 200–375)
WBC # BLD AUTO: 4.08 K/UL (ref 3.9–12.7)

## 2022-12-06 PROCEDURE — 82784 ASSAY IGA/IGD/IGG/IGM EACH: CPT | Mod: 59 | Performed by: NURSE PRACTITIONER

## 2022-12-06 PROCEDURE — 82728 ASSAY OF FERRITIN: CPT | Performed by: NURSE PRACTITIONER

## 2022-12-06 PROCEDURE — 84466 ASSAY OF TRANSFERRIN: CPT | Performed by: NURSE PRACTITIONER

## 2022-12-06 PROCEDURE — 86334 IMMUNOFIX E-PHORESIS SERUM: CPT | Performed by: NURSE PRACTITIONER

## 2022-12-06 PROCEDURE — G0239 OTH RESP PROC, GROUP: HCPCS

## 2022-12-06 PROCEDURE — 80053 COMPREHEN METABOLIC PANEL: CPT | Performed by: NURSE PRACTITIONER

## 2022-12-06 PROCEDURE — 86334 IMMUNOFIX E-PHORESIS SERUM: CPT | Mod: 26,,, | Performed by: PATHOLOGY

## 2022-12-06 PROCEDURE — 36415 COLL VENOUS BLD VENIPUNCTURE: CPT | Performed by: NURSE PRACTITIONER

## 2022-12-06 PROCEDURE — 83521 IG LIGHT CHAINS FREE EACH: CPT | Mod: 59 | Performed by: NURSE PRACTITIONER

## 2022-12-06 PROCEDURE — 84165 PROTEIN E-PHORESIS SERUM: CPT | Performed by: NURSE PRACTITIONER

## 2022-12-06 PROCEDURE — 84165 PATHOLOGIST INTERPRETATION SPE: ICD-10-PCS | Mod: 26,,, | Performed by: PATHOLOGY

## 2022-12-06 PROCEDURE — 86334 PATHOLOGIST INTERPRETATION IFE: ICD-10-PCS | Mod: 26,,, | Performed by: PATHOLOGY

## 2022-12-06 PROCEDURE — 84165 PROTEIN E-PHORESIS SERUM: CPT | Mod: 26,,, | Performed by: PATHOLOGY

## 2022-12-06 PROCEDURE — 85025 COMPLETE CBC W/AUTO DIFF WBC: CPT | Performed by: NURSE PRACTITIONER

## 2022-12-06 NOTE — PROGRESS NOTES
[facility]  Pulmonary Rehab  Session Summary    SUMMARY     Session Data  Session Number: 35  Time In: 1100  Time Out: 1200  Weight: 99.2 kg (218 lb 11.2 oz)  Target Heart Rate Zone: Minimum: 84 bpm  Target Heart Rate Zone: Maximum: 112 bpm  Patient Motivation: Excellent  Patient Effort: Excellent      Pre Exercise Vitals  SpO2: 100 %  Supplemental O2?: No  Pulse: 85  BP: 128/59  Bev Dyspnea Rating : 0      During Exercise Vitals  SpO2: 98 %  Supplemental O2?: No  Pulse: 82  BP: 125/59  Bev Dyspnea Rating : 2      Post Exercise Vitals  SpO2: 100 %  Supplemental O2?: No  Pulse: 91  BP: 130/58  Bev Dyspnea Rating : 1       Modality  Modality: Arm Ergometer, Hand Free Weights, Nustep      Arm Ergometer  Time: 14 minutes (1.8 miles)  Level: 3.5 (2.2 mets)      Nustep  Time: 25 minutes  Steps: 1864  Load: 7 (load 7 for 10 mins, then dropped to load 6 for 15 mins)  Mets: 3         Biceps  lbs: 6 lbs (dumbbells)  Sets: 2  Reps: 10      Chest  lbs: 6 lbs (dumbbells)  Sets: 2  Reps: 10    Pulmonary Rehab COVID19 Screening Checklist    1. Are you having any of the following physical symptoms?   Yes [] No [x]     Fever or chills  Unexplained muscle or body aches  Cough  Shortness of breath or difficulty breathing  Fatigue  Headache  New loss of taste or smell  Sore throat  Congestion or runny nose  Nausea or vomiting  Diarrhea      2.  Have you come in close contact with anyone with the above symptoms or with known COVID19 in the last 14 days? Yes [] No [x]        3.  Have you tested positive for COVID19 in the last 30 days?   Yes [] No [x]        Education  Pulm knowledge test review  Diaphragmatic breathing      Therapist Notes   Excellent effort. Vitals stable, he tolerated all exercises well. Today was pt's last exercise day. DC is scheduled for Friday, 12/9 at 1000.     Dr. Machuca immediately available as needed.

## 2022-12-07 LAB
ALBUMIN SERPL ELPH-MCNC: 3.91 G/DL (ref 3.35–5.55)
ALPHA1 GLOB SERPL ELPH-MCNC: 0.22 G/DL (ref 0.17–0.41)
ALPHA2 GLOB SERPL ELPH-MCNC: 0.59 G/DL (ref 0.43–0.99)
B-GLOBULIN SERPL ELPH-MCNC: 0.59 G/DL (ref 0.5–1.1)
GAMMA GLOB SERPL ELPH-MCNC: 0.89 G/DL (ref 0.67–1.58)
INTERPRETATION SERPL IFE-IMP: NORMAL
KAPPA LC SER QL IA: 3.38 MG/DL (ref 0.33–1.94)
KAPPA LC/LAMBDA SER IA: 1.56 (ref 0.26–1.65)
LAMBDA LC SER QL IA: 2.16 MG/DL (ref 0.57–2.63)
PATHOLOGIST INTERPRETATION IFE: NORMAL
PATHOLOGIST INTERPRETATION SPE: NORMAL
PROT SERPL-MCNC: 6.2 G/DL (ref 6–8.4)

## 2022-12-08 ENCOUNTER — OFFICE VISIT (OUTPATIENT)
Dept: DERMATOLOGY | Facility: CLINIC | Age: 82
End: 2022-12-08
Payer: MEDICARE

## 2022-12-08 DIAGNOSIS — L29.9 PRURITUS: ICD-10-CM

## 2022-12-08 PROCEDURE — 1160F PR REVIEW ALL MEDS BY PRESCRIBER/CLIN PHARMACIST DOCUMENTED: ICD-10-PCS | Mod: CPTII,S$GLB,, | Performed by: STUDENT IN AN ORGANIZED HEALTH CARE EDUCATION/TRAINING PROGRAM

## 2022-12-08 PROCEDURE — 99999 PR PBB SHADOW E&M-EST. PATIENT-LVL IV: CPT | Mod: PBBFAC,,, | Performed by: STUDENT IN AN ORGANIZED HEALTH CARE EDUCATION/TRAINING PROGRAM

## 2022-12-08 PROCEDURE — 99204 OFFICE O/P NEW MOD 45 MIN: CPT | Mod: S$GLB,,, | Performed by: STUDENT IN AN ORGANIZED HEALTH CARE EDUCATION/TRAINING PROGRAM

## 2022-12-08 PROCEDURE — 1126F AMNT PAIN NOTED NONE PRSNT: CPT | Mod: CPTII,S$GLB,, | Performed by: STUDENT IN AN ORGANIZED HEALTH CARE EDUCATION/TRAINING PROGRAM

## 2022-12-08 PROCEDURE — 1126F PR PAIN SEVERITY QUANTIFIED, NO PAIN PRESENT: ICD-10-PCS | Mod: CPTII,S$GLB,, | Performed by: STUDENT IN AN ORGANIZED HEALTH CARE EDUCATION/TRAINING PROGRAM

## 2022-12-08 PROCEDURE — 1159F PR MEDICATION LIST DOCUMENTED IN MEDICAL RECORD: ICD-10-PCS | Mod: CPTII,S$GLB,, | Performed by: STUDENT IN AN ORGANIZED HEALTH CARE EDUCATION/TRAINING PROGRAM

## 2022-12-08 PROCEDURE — 99999 PR PBB SHADOW E&M-EST. PATIENT-LVL IV: ICD-10-PCS | Mod: PBBFAC,,, | Performed by: STUDENT IN AN ORGANIZED HEALTH CARE EDUCATION/TRAINING PROGRAM

## 2022-12-08 PROCEDURE — 3288F PR FALLS RISK ASSESSMENT DOCUMENTED: ICD-10-PCS | Mod: CPTII,S$GLB,, | Performed by: STUDENT IN AN ORGANIZED HEALTH CARE EDUCATION/TRAINING PROGRAM

## 2022-12-08 PROCEDURE — 1101F PT FALLS ASSESS-DOCD LE1/YR: CPT | Mod: CPTII,S$GLB,, | Performed by: STUDENT IN AN ORGANIZED HEALTH CARE EDUCATION/TRAINING PROGRAM

## 2022-12-08 PROCEDURE — 3072F LOW RISK FOR RETINOPATHY: CPT | Mod: CPTII,S$GLB,, | Performed by: STUDENT IN AN ORGANIZED HEALTH CARE EDUCATION/TRAINING PROGRAM

## 2022-12-08 PROCEDURE — 3072F PR LOW RISK FOR RETINOPATHY: ICD-10-PCS | Mod: CPTII,S$GLB,, | Performed by: STUDENT IN AN ORGANIZED HEALTH CARE EDUCATION/TRAINING PROGRAM

## 2022-12-08 PROCEDURE — 3288F FALL RISK ASSESSMENT DOCD: CPT | Mod: CPTII,S$GLB,, | Performed by: STUDENT IN AN ORGANIZED HEALTH CARE EDUCATION/TRAINING PROGRAM

## 2022-12-08 PROCEDURE — 99204 PR OFFICE/OUTPT VISIT, NEW, LEVL IV, 45-59 MIN: ICD-10-PCS | Mod: S$GLB,,, | Performed by: STUDENT IN AN ORGANIZED HEALTH CARE EDUCATION/TRAINING PROGRAM

## 2022-12-08 PROCEDURE — 1159F MED LIST DOCD IN RCRD: CPT | Mod: CPTII,S$GLB,, | Performed by: STUDENT IN AN ORGANIZED HEALTH CARE EDUCATION/TRAINING PROGRAM

## 2022-12-08 PROCEDURE — 1160F RVW MEDS BY RX/DR IN RCRD: CPT | Mod: CPTII,S$GLB,, | Performed by: STUDENT IN AN ORGANIZED HEALTH CARE EDUCATION/TRAINING PROGRAM

## 2022-12-08 PROCEDURE — 1101F PR PT FALLS ASSESS DOC 0-1 FALLS W/OUT INJ PAST YR: ICD-10-PCS | Mod: CPTII,S$GLB,, | Performed by: STUDENT IN AN ORGANIZED HEALTH CARE EDUCATION/TRAINING PROGRAM

## 2022-12-08 RX ORDER — DOXEPIN HYDROCHLORIDE 10 MG/1
10 CAPSULE ORAL NIGHTLY
Qty: 90 CAPSULE | Refills: 0 | Status: SHIPPED | OUTPATIENT
Start: 2022-12-08 | End: 2023-03-15

## 2022-12-08 NOTE — PROGRESS NOTES
Subjective:       Patient ID:  Srinath Mcknight is a 82 y.o. male who presents for   Chief Complaint   Patient presents with    Itching     Patient c/o itching on right and left shoulders and back. Itching present between legs and on lower abdomen. X 1 year.      History of Present Illness: The patient presents with chief complaint of generalized itching.  Location: located all over the body, mostly on the back, shoulders, and abdomen  Duration: ongoing off and on for a year  Signs/Symptoms: generalized itching, leading to sores and scratch marks. Denies any rash associated with symptoms  Prior treatments: has tried several anti itching creams, including prescription topical steroids, with no improvement in symptoms.         Review of Systems   Constitutional:  Negative for fever and chills.      Objective:    Physical Exam   Constitutional: He appears well-developed and well-nourished. No distress.   Neurological: He is alert and oriented to person, place, and time. He is not disoriented.   Psychiatric: He has a normal mood and affect.   Skin:   Areas Examined (abnormalities noted in diagram):   Head / Face Inspection Performed  Neck Inspection Performed  Chest / Axilla Inspection Performed  Abdomen Inspection Performed  Back Inspection Performed  RUE Inspected  LUE Inspection Performed            Diagram Legend     Erythematous scaling macule/papule c/w actinic keratosis       Vascular papule c/w angioma      Pigmented verrucoid papule/plaque c/w seborrheic keratosis      Yellow umbilicated papule c/w sebaceous hyperplasia      Irregularly shaped tan macule c/w lentigo     1-2 mm smooth white papules consistent with Milia      Movable subcutaneous cyst with punctum c/w epidermal inclusion cyst      Subcutaneous movable cyst c/w pilar cyst      Firm pink to brown papule c/w dermatofibroma      Pedunculated fleshy papule(s) c/w skin tag(s)      Evenly pigmented macule c/w junctional nevus     Mildly variegated  pigmented, slightly irregular-bordered macule c/w mildly atypical nevus      Flesh colored to evenly pigmented papule c/w intradermal nevus       Pink pearly papule/plaque c/w basal cell carcinoma      Erythematous hyperkeratotic cursted plaque c/w SCC      Surgical scar with no sign of skin cancer recurrence      Open and closed comedones      Inflammatory papules and pustules      Verrucoid papule consistent consistent with wart     Erythematous eczematous patches and plaques     Dystrophic onycholytic nail with subungual debris c/w onychomycosis     Umbilicated papule    Erythematous-base heme-crusted tan verrucoid plaque consistent with inflamed seborrheic keratosis     Erythematous Silvery Scaling Plaque c/w Psoriasis     See annotation      Assessment / Plan:        Pruritus - no cutaneous findings to suggest cause for pruritus symptoms. Discussed with patient treatment with oral doxepin. Discussed side effects of possible drowsiness. Will try a course of low dose nightly and see if this helps with symptoms.   -     doxepin (SINEQUAN) 10 MG capsule; Take 1 capsule (10 mg total) by mouth every evening.  Dispense: 90 capsule; Refill: 0  -     Counseled patient on gentle skin care regimen, including need for sensitive soaps/detergents, as well as need for frequent use of sensitize moisturizers.                Follow up in about 7 weeks (around 1/26/2023).

## 2022-12-08 NOTE — PATIENT INSTRUCTIONS
XEROSIS (DRY SKIN)        Definition    Xerosis is the term for dry skin.  We all have a natural oil coating over our skin produced by the skin oil glands.  If this oil is removed, the skin becomes dry which can lead to cracking, which can lead to inflammation.  Xerosis is usually a long-term problem that recurs often, especially in the winter.    Cause    Long hot baths or showers can remove our natural oil and lead to xerosis.  One should never take more than one bath or shower a day and for no longer than ten minutes.  Use of harsh soaps such as Zest, Dial, and Ivory can worsen and cause xerosis.  Cold winter weather worsens xerosis because the amount of moisture contained in cold air is much less than the amount of moisture in warm air.    Treatment    Treatment is intended to restore the natural oil to your skin.  Keep the skin lubricated.    Do not take more than one bath or shower a day.  Use lukewarm water, not hot.  Hot water dries out the skin.    Use a gentle moisturizing soap such as Cetaphil soap, Oil of Olay, Dove, Basis, Ivory moisture care, Restoraderm cleanser.    When toweling dry, dont rub.  Blot the skin so there is still some water left on the skin.  You should apply a moisturizing cream to all of the skin such as Cerave cream, Cetaphil cream, Restoraderm or Eucerin Original Formula cream.   Alpha hydroxyacid lotions, i.e., AmLactin, also work very well for preventing dry skin, but may burn when used on inflamed or reddened skin.    If you like to swim during the winter months, you should not use soap when getting out of the pool.  When you have finished swimming, rinse off the chlorine with cool to warm water.  If this will be the only shower of the day, then you may use Cetaphil or another mild soap to cleanse your skin.  After the shower, apply a moisturizing cream to all of the skin as above.

## 2022-12-09 ENCOUNTER — HOSPITAL ENCOUNTER (OUTPATIENT)
Dept: PULMONOLOGY | Facility: HOSPITAL | Age: 82
Discharge: HOME | End: 2022-12-09
Attending: INTERNAL MEDICINE
Payer: MEDICARE

## 2022-12-09 VITALS — BODY MASS INDEX: 27.01 KG/M2 | HEIGHT: 75 IN | WEIGHT: 217.19 LBS

## 2022-12-09 PROCEDURE — G0239 OTH RESP PROC, GROUP: HCPCS

## 2022-12-09 NOTE — PROGRESS NOTES
[facility]  Pulmonary Rehab  Discharge Summary     SUMMARY      Summary of Progress:   Pt did very well in rehab. He worked hard in his sessions. He was dedicated to rehab and rarely missed his sessions. He increased his strength and endurance as evidenced in the exercise capacity summary below. He also increased distance walked on the discharge 6 MWT by 100 ft. He had an improvement in the physical section of the SF-36 as well. Pt spoke highly of the program, stating he felt he improved in many ways and learned a lot about breathing. Pt states he will continue to exercise at a [employer] near his home.      Total Visits: 36    Total Compliance:  97%    Referring provider:  Dr. Machuca    Start date: 22     Questionnaire Score:               Pulmonary Knowledge Test: 100     SF36 Physical Functionin      SF36 Mental Health: 54    Physical Health Summary  Your Score: 39  Your current score indicates that you are the same or better than the general population of similar age and gender.  Compared to this population your health is:    Well Below the average in:  Performance at work, home, or school due to physical problems  Below the average in:  Overall physical functioning  Ability to participate in activities due to bodily pain  Same or Better than the average in:  General health    Mental Health Summary  Your Score: 54  Your current score indicates that you are the same or better than the general population of similar age and gender.  Compared to this population your health is:    Well Below the average in:  Performance at work, home, or school due to emotional problems  Same or Better than the average in:  Level of energy  Ability to participate in social activities  Emotional well-being    Progress  Physical Health Progress   Date Score   Current: Dec 09, 2022 39 Your score is better than your previous survey  Previous: Aug 01, 2022 28  Mental Health  "Progress   Date Score   Current: Dec 09, 2022 54 Your score is about the same as your previous survey  Previous: Aug 01, 2022 56    Self Evaluated Transition  Compared to one year ago, you rated your health in general as: Much better       Exercise Summary:    Nustep Date:  8/4/22   Time Load Steps Date:  12/6/22 Time Load Steps     20 4 1645  25 6/7 1864   Recumbent Bike Date:  8/4/22  (1.06 miles)   Time Level Date:  9/22/22  (1.49 miles) Time Level     10 1  10 2   Treadmill Date:  8/16/22   Time Speed Grade Date:  8/24/22 Time Speed Grade     3 1.2 0  5 1.2 0   Arm Ergometer Date:  8/4/22  (0.94 miles)   Time Level Date:  12/1/22  (1.82 miles) Time Level     10 1  14 3.5   Free Weights Date:  8/4/22  (Dumb)   Bicep Curls  Chest Press  Triceps  Legs Lbs  3 Sets  2 Reps  10 Date:  12/6/22  (Dumb) Bicep Curls  Chest Press  Triceps  Legs Lbs  6 Sets  2 Reps  10                  Six Minute Walk:     Height: 6' 3" (190.5 cm)      Weight: 98.5 kg (217 lb 3.2 oz)     Performing RT: Dejah cA              Phase Oxygen Assessment Supplemental O2 Heart   Rate Blood Pressure Bev Dyspnea Scale Rating   Resting 97 % Room Air 73 bpm 133/71 1   Exercise        Minute        1 98 % Room Air 81 bpm     2 95 % Room Air 92 bpm     3 95 % Room Air 98 bpm     4 95 % Room Air 96 bpm     5 98 % Room Air 100 bpm     6  98 % Room Air 103 bpm 172/76 3   Recovery        Minute        1 98 % Room Air 92 bpm     2 98 % Room Air 88 bpm     3 98 % Room Air 84 bpm     4 98 % Room Air 82 bpm 168/85   0        Six Minute Walk Summary        Total Time Walked (Calculated): 360 seconds  Total Distance Meters (Calculated): 243.84 meters  Predicted Distance Meters (Calculated): 558.09 meters  Percentage of Predicted (Calculated): 43.69  Peak VO2 (Calculated): 11.3  Mets: 3.23  Symptoms: dyspnea, lightheadedness           Exercise Results Summary:     Weight:  Initial: 215.5 lbs  Mid: 223.4 lbs  Discharge: 217.2 lbs    Oxygen Use: Room air   "   Dyspnea: 0-3 on Bev dyspnea scale     Goals:  met     I certify this patient is ready to discharge from pulmonary rehabilitation.

## 2022-12-12 ENCOUNTER — OFFICE VISIT (OUTPATIENT)
Dept: URGENT CARE | Facility: CLINIC | Age: 82
End: 2022-12-12
Payer: MEDICARE

## 2022-12-12 ENCOUNTER — HOSPITAL ENCOUNTER (OUTPATIENT)
Dept: RADIOLOGY | Facility: CLINIC | Age: 82
Discharge: HOME | End: 2022-12-12
Payer: MEDICARE

## 2022-12-12 VITALS
RESPIRATION RATE: 16 BRPM | BODY MASS INDEX: 26.98 KG/M2 | HEIGHT: 75 IN | SYSTOLIC BLOOD PRESSURE: 130 MMHG | WEIGHT: 217 LBS | DIASTOLIC BLOOD PRESSURE: 60 MMHG | OXYGEN SATURATION: 97 % | HEART RATE: 84 BPM | TEMPERATURE: 98 F

## 2022-12-12 DIAGNOSIS — S91.209A NAIL AVULSION OF TOE, INITIAL ENCOUNTER: ICD-10-CM

## 2022-12-12 DIAGNOSIS — S99.922A TOE INJURY, LEFT, INITIAL ENCOUNTER: ICD-10-CM

## 2022-12-12 DIAGNOSIS — S99.921A TOE INJURY, RIGHT, INITIAL ENCOUNTER: ICD-10-CM

## 2022-12-12 DIAGNOSIS — S92.425A NONDISPLACED FRACTURE OF DISTAL PHALANX OF LEFT GREAT TOE, INITIAL ENCOUNTER FOR CLOSED FRACTURE: Primary | ICD-10-CM

## 2022-12-12 PROCEDURE — 1159F MED LIST DOCD IN RCRD: CPT | Mod: CPTII,S$GLB,,

## 2022-12-12 PROCEDURE — 11730 NAIL REMOVAL: ICD-10-PCS | Mod: T5,S$GLB,,

## 2022-12-12 PROCEDURE — 73660 X-RAY EXAM OF TOE(S): CPT | Mod: LT,S$GLB,, | Performed by: RADIOLOGY

## 2022-12-12 PROCEDURE — 1125F PR PAIN SEVERITY QUANTIFIED, PAIN PRESENT: ICD-10-PCS | Mod: CPTII,S$GLB,,

## 2022-12-12 PROCEDURE — 3075F PR MOST RECENT SYSTOLIC BLOOD PRESS GE 130-139MM HG: ICD-10-PCS | Mod: CPTII,S$GLB,,

## 2022-12-12 PROCEDURE — 3072F PR LOW RISK FOR RETINOPATHY: ICD-10-PCS | Mod: CPTII,S$GLB,,

## 2022-12-12 PROCEDURE — 1125F AMNT PAIN NOTED PAIN PRSNT: CPT | Mod: CPTII,S$GLB,,

## 2022-12-12 PROCEDURE — 73660 X-RAY EXAM OF TOE(S): CPT | Mod: RT,S$GLB,, | Performed by: RADIOLOGY

## 2022-12-12 PROCEDURE — 1160F PR REVIEW ALL MEDS BY PRESCRIBER/CLIN PHARMACIST DOCUMENTED: ICD-10-PCS | Mod: CPTII,S$GLB,,

## 2022-12-12 PROCEDURE — 3075F SYST BP GE 130 - 139MM HG: CPT | Mod: CPTII,S$GLB,,

## 2022-12-12 PROCEDURE — 3078F DIAST BP <80 MM HG: CPT | Mod: CPTII,S$GLB,,

## 2022-12-12 PROCEDURE — 11730 AVULSION NAIL PLATE SIMPLE 1: CPT | Mod: T5,S$GLB,,

## 2022-12-12 PROCEDURE — 1159F PR MEDICATION LIST DOCUMENTED IN MEDICAL RECORD: ICD-10-PCS | Mod: CPTII,S$GLB,,

## 2022-12-12 PROCEDURE — 1160F RVW MEDS BY RX/DR IN RCRD: CPT | Mod: CPTII,S$GLB,,

## 2022-12-12 PROCEDURE — 3078F PR MOST RECENT DIASTOLIC BLOOD PRESSURE < 80 MM HG: ICD-10-PCS | Mod: CPTII,S$GLB,,

## 2022-12-12 PROCEDURE — 73660 XR TOE 2 OR MORE VIEWS RIGHT: ICD-10-PCS | Mod: RT,S$GLB,, | Performed by: RADIOLOGY

## 2022-12-12 PROCEDURE — 99214 PR OFFICE/OUTPT VISIT, EST, LEVL IV, 30-39 MIN: ICD-10-PCS | Mod: 25,S$GLB,,

## 2022-12-12 PROCEDURE — 99214 OFFICE O/P EST MOD 30 MIN: CPT | Mod: 25,S$GLB,,

## 2022-12-12 PROCEDURE — 3072F LOW RISK FOR RETINOPATHY: CPT | Mod: CPTII,S$GLB,,

## 2022-12-12 RX ORDER — MUPIROCIN 20 MG/G
OINTMENT TOPICAL
Status: COMPLETED | OUTPATIENT
Start: 2022-12-12 | End: 2022-12-12

## 2022-12-12 RX ORDER — MUPIROCIN 20 MG/G
OINTMENT TOPICAL 3 TIMES DAILY
Qty: 30 G | Refills: 0 | Status: SHIPPED | OUTPATIENT
Start: 2022-12-12 | End: 2022-12-19

## 2022-12-12 RX ADMIN — MUPIROCIN: 20 OINTMENT TOPICAL at 03:12

## 2022-12-12 NOTE — PROCEDURES
Nail Removal    Date/Time: 12/12/2022 1:30 PM  Performed by: Macy Martinez PA-C  Authorized by: Macy Martinez PA-C     Location:     Location:  Right foot    Location detail:  Right big toe  Anesthesia:     Anesthesia:  Digital block    Local anesthetic:  Lidocaine 1% without epinephrine    Anesthetic total (ml):  6  Procedure Details:     Preparation:  Skin prepped with Betadine    Amount removed:  Complete    Wedge excision of skin of nail fold: No      Nail bed sutured?: No      Nail matrix removed:  None    Removed nail replaced and anchored: No      Dressing applied:  Antibiotic ointment, 4x4 and dressing applied    Patient tolerance:  Patient tolerated the procedure well with no immediate complications

## 2022-12-12 NOTE — PROGRESS NOTES
"Subjective:       Patient ID: Srinath Mcknight is a 82 y.o. male.    Vitals:  height is 6' 3" (1.905 m) and weight is 98.4 kg (217 lb). His temporal temperature is 97.8 °F (36.6 °C). His blood pressure is 130/60 and his pulse is 84. His respiration is 16 and oxygen saturation is 97%.     Chief Complaint: Foot Injury    Patient presents today with injury to both feet/toes. Patient states he fell out of bed in the middle of the night and hit his feet. Patient states they are bruised and toenail on his right big toe is lifted up. Not painful but patient also has diabetic neuropathy    Foot Injury   The incident occurred 2 days ago. The incident occurred at home. The pain is present in the left toes, left foot, right foot and right toes. The pain has been Constant since onset. Associated symptoms include numbness (chronic) and tingling. Treatments tried: neosprin, banaids.     Neurological:  Positive for numbness (chronic).     Objective:      Physical Exam   Constitutional:  Non-toxic appearance. He does not appear ill. No distress.   HENT:   Head: Normocephalic and atraumatic.   Ears:   Right Ear: External ear normal.   Left Ear: External ear normal.   Eyes: Conjunctivae are normal.   Cardiovascular: Normal rate.   Pulmonary/Chest: Effort normal.   Abdominal: Normal appearance.   Musculoskeletal:        Feet:       Comments: Left great toe with bruising and swelling to medial aspect  Nontender but patient has neuropathy. He has good ROM    Right foot: Nail on right great toe 3/4 of the way avulsed. Completely lifted off nail bed and attached only at the marked spot on diagram    Neurological: He is alert.   Skin: Skin is warm and dry.           X-Ray Toe 2 or More Views Left    Result Date: 12/12/2022  EXAMINATION: XR TOE 2 OR MORE VIEWS LEFT CLINICAL HISTORY: Unspecified injury of left foot, initial encounter TECHNIQUE: AP and lateral views of the left great toe were performed COMPARISON: 12/17/2018 FINDINGS: There " is an obliquely oriented linear lucency seen involving the base of the 1st distal phalanx medially which is concerning for possible nondisplaced fracture.  Recommend clinical correlation for site of point tenderness.  This lucency closely approximates the articular surface at the IP joint.  No evidence of dislocation.  Mild degenerative findings are present at the 1st MTP joint with suspected hallux valgus deformity.     Possible nondisplaced fracture of the 1st distal phalanx as above     Electronically signed by: Jonathon Contreras MD Date:    12/12/2022 Time:    15:21    X-Ray Toe 2 or More Views Right    Result Date: 12/12/2022  EXAMINATION: XR TOE 2 OR MORE VIEWS RIGHT CLINICAL HISTORY: Unspecified injury of right foot, initial encounter TECHNIQUE: Two views of the right toes were performed COMPARISON: None FINDINGS: There is no radiographic evidence of acute osseous, articular, or soft tissue abnormality.  Moderate degenerative findings are present at the 1st MTP joint with suspected hallux valgus deformity.     As above.  No acute findings. Electronically signed by: Jonathon Contreras MD Date:    12/12/2022 Time:    15:21     Assessment:       1. Nondisplaced fracture of distal phalanx of left great toe, initial encounter for closed fracture    2. Toe injury, left, initial encounter    3. Toe injury, right, initial encounter    4. Nail avulsion of toe, initial encounter          Plan:         Discussed risk of nail removal of right great toe. Nail almost completely avulsed on initial exam. Patient opted for me to finish removing it. Discussed risks of infection especially as patient has diabetic neuropathy. Discussed importance of proper wound care. Patient given strict verbal and print out instructions regarding wound care and signs of infection.   Regarding fracture, left big toe and second toe were nathalie taped together and he was placed in a post-op shoe. Podiatry referral in place for both nail avulsion f/u  and distal phalanx fracture.      Nondisplaced fracture of distal phalanx of left great toe, initial encounter for closed fracture  -     Ambulatory referral/consult to Podiatry  -     Cancel: NON-PNEUMATIC WALKING BOOT FOR HOME USE  -     NON-PNEUMATIC WALKING BOOT FOR HOME USE    Toe injury, left, initial encounter  -     X-Ray Toe 2 or More Views Left; Future; Expected date: 12/12/2022    Toe injury, right, initial encounter  -     X-Ray Toe 2 or More Views Right; Future; Expected date: 12/12/2022    Nail avulsion of toe, initial encounter  -     X-Ray Toe 2 or More Views Right; Future; Expected date: 12/12/2022  -     mupirocin (BACTROBAN) 2 % ointment; Apply topically 3 (three) times daily. for 7 days  Dispense: 30 g; Refill: 0  -     mupirocin 2 % ointment  -     Cancel: Ambulatory referral/consult to Podiatry  -     Nail Removal    Other orders  -     Cancel: Nail Removal

## 2022-12-12 NOTE — PATIENT INSTRUCTIONS
General Referral to [facility]   You were referred to [facility] for follow-up care on your condition.    Please call [phone] ([place]) - [place] - [phone] ([place])  to schedule your appointment.    Please go to the Emergency Department for any concerns or worsening of condition.  Please follow up with your primary care doctor or specialist in the next 48-72hrs as needed.    If you  smoke, please stop smoking.    Toe Fracture:   - Buddy tape big toe to second toe as shown in clinic  - Change tape if it gets wet  - Tylenol or ibuprofen if no allergies or contraindications for pain relief  - Follow up with podiatry             Laceration   If your condition worsens or fails to improve we recommend that you receive another evaluation at the ER immediately or contact your PCP to discuss your concerns or return here. You must understand that you've received an urgent care treatment only and that you may be released before all your medical problems are known or treated. You the patient will arrange for followup care as instructed.     Gently clean wound with soap and water at least twice daily unless more frequently soiled, then clean more frequently.      May apply topical antibiotic ointment as prescribed to wound to promote healing.   Clean old antibiotic ointment away before new application.      Keep covered when you are out and about, if the dressing becomes wet, change it immediately. Keep open to air when at home.    Monitor for signs of infection such as redness, drainage, increase swelling or increase pain.   Contact the clinic or your healthcare provider if any of the below occurs:  Wound bleeding not controlled by direct pressure  Signs of infection, including increasing pain in the wound, increasing wound redness or swelling, or pus or bad odor coming from the wound  Fever of 100.4°F (38.ºC) or higher or as directed by your healthcare provider  Wound edges re-open  Wound  changes colors  Numbness around the wound   Decreased movement around the injured area    If you were given narcotics for pain do not drive or operate heavy equipment or machinery.     Tylenol or ibuprofen can also be used as directed for pain unless you have an allergy to them or medical condition such as stomach ulcers, kidney or liver disease or blood thinners etc for which you should not be taking these type of medications.

## 2022-12-13 ENCOUNTER — OFFICE VISIT (OUTPATIENT)
Dept: HEMATOLOGY/ONCOLOGY | Facility: CLINIC | Age: 82
End: 2022-12-13
Payer: MEDICARE

## 2022-12-13 VITALS
HEIGHT: 75 IN | BODY MASS INDEX: 27.96 KG/M2 | HEART RATE: 86 BPM | WEIGHT: 224.88 LBS | DIASTOLIC BLOOD PRESSURE: 57 MMHG | TEMPERATURE: 98 F | OXYGEN SATURATION: 98 % | SYSTOLIC BLOOD PRESSURE: 104 MMHG

## 2022-12-13 DIAGNOSIS — D64.9 CHRONIC ANEMIA: ICD-10-CM

## 2022-12-13 DIAGNOSIS — D57.3 SICKLE CELL TRAIT: ICD-10-CM

## 2022-12-13 PROCEDURE — 99213 PR OFFICE/OUTPT VISIT, EST, LEVL III, 20-29 MIN: ICD-10-PCS | Mod: S$GLB,,, | Performed by: NURSE PRACTITIONER

## 2022-12-13 PROCEDURE — 1159F PR MEDICATION LIST DOCUMENTED IN MEDICAL RECORD: ICD-10-PCS | Mod: CPTII,S$GLB,, | Performed by: NURSE PRACTITIONER

## 2022-12-13 PROCEDURE — 1101F PR PT FALLS ASSESS DOC 0-1 FALLS W/OUT INJ PAST YR: ICD-10-PCS | Mod: CPTII,S$GLB,, | Performed by: NURSE PRACTITIONER

## 2022-12-13 PROCEDURE — 3288F PR FALLS RISK ASSESSMENT DOCUMENTED: ICD-10-PCS | Mod: CPTII,S$GLB,, | Performed by: NURSE PRACTITIONER

## 2022-12-13 PROCEDURE — 99999 PR PBB SHADOW E&M-EST. PATIENT-LVL V: CPT | Mod: PBBFAC,,, | Performed by: NURSE PRACTITIONER

## 2022-12-13 PROCEDURE — 1160F PR REVIEW ALL MEDS BY PRESCRIBER/CLIN PHARMACIST DOCUMENTED: ICD-10-PCS | Mod: CPTII,S$GLB,, | Performed by: NURSE PRACTITIONER

## 2022-12-13 PROCEDURE — 1159F MED LIST DOCD IN RCRD: CPT | Mod: CPTII,S$GLB,, | Performed by: NURSE PRACTITIONER

## 2022-12-13 PROCEDURE — 3078F DIAST BP <80 MM HG: CPT | Mod: CPTII,S$GLB,, | Performed by: NURSE PRACTITIONER

## 2022-12-13 PROCEDURE — 1101F PT FALLS ASSESS-DOCD LE1/YR: CPT | Mod: CPTII,S$GLB,, | Performed by: NURSE PRACTITIONER

## 2022-12-13 PROCEDURE — 1160F RVW MEDS BY RX/DR IN RCRD: CPT | Mod: CPTII,S$GLB,, | Performed by: NURSE PRACTITIONER

## 2022-12-13 PROCEDURE — 99999 PR PBB SHADOW E&M-EST. PATIENT-LVL V: ICD-10-PCS | Mod: PBBFAC,,, | Performed by: NURSE PRACTITIONER

## 2022-12-13 PROCEDURE — 1125F AMNT PAIN NOTED PAIN PRSNT: CPT | Mod: CPTII,S$GLB,, | Performed by: NURSE PRACTITIONER

## 2022-12-13 PROCEDURE — 99213 OFFICE O/P EST LOW 20 MIN: CPT | Mod: S$GLB,,, | Performed by: NURSE PRACTITIONER

## 2022-12-13 PROCEDURE — 3078F PR MOST RECENT DIASTOLIC BLOOD PRESSURE < 80 MM HG: ICD-10-PCS | Mod: CPTII,S$GLB,, | Performed by: NURSE PRACTITIONER

## 2022-12-13 PROCEDURE — 3288F FALL RISK ASSESSMENT DOCD: CPT | Mod: CPTII,S$GLB,, | Performed by: NURSE PRACTITIONER

## 2022-12-13 PROCEDURE — 3072F LOW RISK FOR RETINOPATHY: CPT | Mod: CPTII,S$GLB,, | Performed by: NURSE PRACTITIONER

## 2022-12-13 PROCEDURE — 1125F PR PAIN SEVERITY QUANTIFIED, PAIN PRESENT: ICD-10-PCS | Mod: CPTII,S$GLB,, | Performed by: NURSE PRACTITIONER

## 2022-12-13 PROCEDURE — 3074F SYST BP LT 130 MM HG: CPT | Mod: CPTII,S$GLB,, | Performed by: NURSE PRACTITIONER

## 2022-12-13 PROCEDURE — 3074F PR MOST RECENT SYSTOLIC BLOOD PRESSURE < 130 MM HG: ICD-10-PCS | Mod: CPTII,S$GLB,, | Performed by: NURSE PRACTITIONER

## 2022-12-13 PROCEDURE — 3072F PR LOW RISK FOR RETINOPATHY: ICD-10-PCS | Mod: CPTII,S$GLB,, | Performed by: NURSE PRACTITIONER

## 2022-12-13 NOTE — ASSESSMENT & PLAN NOTE
Anemia multifactorial including CKD, chronic disease, sickle cell trait    Laboratory review overall stable. He remains anemic but hemoglobin remains near baseline hemoglobin 12.7. His baseline is 11-12 range. Anemia workup has been essentially unremarkable. Vitamin B12 on the lower end of normal at pervious visit. He was asked to take sublingual Vitamin B12 1,000 mcg daily.     Previous Referral to Nephrology for CKD III. Appointment cancelled due to COVID concerns.  He has not yet had Nephrology follow up    EGD/Colonoscopy done 6/2020. EGD revealed gastritis with pathology positive for H. Pylori. Colonoscopy reveal polyps with unremarkable pathology, recommended repeat in 5 years    Continue B12 supplementation. Patient reports noting constipation. Trial off oral iron supplementation. F/u 6 months with repeat labs

## 2022-12-13 NOTE — PROGRESS NOTES
Subjective:       Patient ID: Srinath Mcknight is a 82 y.o. male.    Chief Complaint: F/U anemia    HPI:  82 y.o. male with HTN, OA, chronic anemia, DM, CAD, HLD follows up in the Heme-Onc clinic for h/o chronic anemia of unknown origin. The patient has had workup for chronic anemia of unknown origin. The laboratory studies are unremarkable. The patient has two negative stool occult blood testing on file. The patient continues to take daily iron pills. He tells me he was instructed to take iron pills by his PCP but has no knowledge of iron deficiency history.  Upon review of the medical record the patient has not had documented iron deficiency but iron levels are low normal. Patient does have sickle cell trait. He tells me this was already known to him.     Today's visit: Patient presents today for follow up of his chronic anemia. He continues to take oral iron replacement. Taking vitamin B12 supplement without missed doses. When prompted he notes constipation relieved by intermittent laxative use. Takes stool softener daily. Unsure if onset with oral iron supplementation. Has c/o chronic SOB with exertion. Has underwent Cardiology and Pulmonology evaluation, unremarkable     Social History     Socioeconomic History    Marital status:     Number of children: 0    Highest education level: Master's degree (e.g., MA, MS, Olegario, MEd, MSW, CARMEL)   Occupational History    Occupation: retired     Comment: teacher   Tobacco Use    Smoking status: Never    Smokeless tobacco: Never   Substance and Sexual Activity    Alcohol use: Yes     Alcohol/week: 1.0 standard drink     Types: 1 Glasses of wine per week     Comment: rarely  No alcohol prior to surgery    Drug use: No    Sexual activity: Yes     Partners: Female     Birth control/protection: Condom   Social History Narrative    . No children. Retired but some part time work - 4 hours a day. 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 Caffeine intake - sugar free  cola, Rare coffee intake. Still drives. Does have a Living Will . Has a nephew Jt Gayle, lives in [place]. Has a friend Karina Rossi, locally who could help him.        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    Trouble in sleeping     Type 2 diabetes mellitus with diabetic polyneuropathy, without long-term current use of insulin     Urinary incontinence     Uses hearing aid     bilat       Family History   Problem Relation Age of Onset    Hypertension Mother     Heart disease Mother     Diabetes Mother     Hypertension Father     Heart disease Father     Diabetes Father     Stroke Father     Diabetes Sister     Cancer Brother 50        pancreas    Drug abuse Brother     Prostate cancer Neg Hx     Macular degeneration Neg Hx     Retinal detachment Neg Hx     Strabismus Neg Hx     Glaucoma Neg Hx     Blindness Neg Hx     Amblyopia Neg Hx     Kidney disease Neg Hx     Mental illness Neg Hx     Mental retardation Neg Hx     COPD Neg Hx     Asthma Neg Hx        Past Surgical History:   Procedure Laterality Date    BACK SURGERY  2019    COLONOSCOPY N/A 6/30/2020    Procedure: COLONOSCOPY;  Surgeon: Joseph Iraheta MD;  Location: [facility];  Service: Endoscopy;  Laterality: N/A;    ESOPHAGOGASTRODUODENOSCOPY N/A 6/30/2020    Procedure: EGD (ESOPHAGOGASTRODUODENOSCOPY);  Surgeon:  Joseph Iraheta MD;  Location: [facility];  Service: Endoscopy;  Laterality: N/A;    HERNIA REPAIR Bilateral 04/18/2017    JOINT REPLACEMENT Left 10/09/2017    L YAHIR Dr. Alcocer    LAMINECTOMY  07/22/2016    left elbow  10/2017    procedure to remove gout    lumbar foraminotomy  03/2018    REVISION TOTAL HIP ARTHROPLASTY Left 9/11/2018    Procedure: REVISION, TOTAL ARTHROPLASTY, HIP;  Surgeon: Albaro Alcocer Sr., MD;  Location: [facility] OR;  Service: Orthopedics;  Laterality: Left;    ROTATOR CUFF REPAIR Left 2006    SHOULDER ARTHROSCOPY W/ ROTATOR CUFF REPAIR Right 2009       Review of Systems   Constitutional:  Negative for activity change, appetite change, chills, diaphoresis, fatigue, fever and unexpected weight change.   HENT:  Negative for nosebleeds, sore throat, trouble swallowing and voice change.    Eyes:  Negative for visual disturbance.   Respiratory:  Negative for cough, chest tightness, shortness of breath (with exertion, chronic) and wheezing.    Cardiovascular:  Negative for chest pain, palpitations and leg swelling.   Gastrointestinal:  Negative for abdominal distention, abdominal pain, anal bleeding, blood in stool, constipation, diarrhea, nausea and vomiting.   Genitourinary:  Negative for difficulty urinating, dysuria and hematuria.   Musculoskeletal:  Positive for arthralgias and gait problem (utilizes cane). Negative for back pain and myalgias.        Patient walks with a cane   Skin:  Negative for rash and wound.   Neurological:  Negative for dizziness, weakness, light-headedness and headaches.   Hematological:  Does not bruise/bleed easily.   Psychiatric/Behavioral:  The patient is not nervous/anxious.        Medication List with Changes/Refills   Current Medications    ALBUTEROL (VENTOLIN HFA) 90 MCG/ACTUATION INHALER    Inhale 2 puffs into the lungs every 6 (six) hours as needed for Wheezing or Shortness of Breath. Rescue    ALLOPURINOL (ZYLOPRIM) 100 MG TABLET    TAKE 1 TABLET  EVERY DAY    ASPIRIN (ECOTRIN) 81 MG EC TABLET    Take 1 tablet (81 mg total) by mouth once daily.    BISACODYL (DULCOLAX) 10 MG SUPP        BLOOD GLUCOSE CONTROL, LOW SOLN    by Misc.(Non-Drug; Combo Route) route.    BLOOD SUGAR DIAGNOSTIC (TRUE METRIX GLUCOSE TEST STRIP) STRP    Use as directed to check sugars    BLOOD-GLUCOSE METER MISC    Use as directed to check sugars    CETIRIZINE (ZYRTEC) 10 MG TABLET    TAKE 1 TABLET BY MOUTH EVERY DAY FOR 14 DAYS    CLONAZEPAM (KLONOPIN) 0.5 MG TABLET    Take 1 tablet (0.5 mg total) by mouth every evening.    CYANOCOBALAMIN, VITAMIN B-12, 1,000 MCG SUBL    Place 1,000 mcg under the tongue once daily.    DICLOFENAC SODIUM (VOLTAREN) 1 % GEL    Apply 2 g topically 4 (four) times daily.    DOXEPIN (SINEQUAN) 10 MG CAPSULE    Take 1 capsule (10 mg total) by mouth every evening.    FERROUS SULFATE 324 MG (65 MG IRON) TBEC    Take 1 tablet (324 mg total) by mouth once daily.    GABAPENTIN (NEURONTIN) 600 MG TABLET    TAKE 1 TABLET(600 MG) BY MOUTH TWICE DAILY    GARLIC EXTRACT ORAL    Take 1 tablet by mouth once daily. Per [facility]    LANCETS MISC    Use as directed to check sugars    LEXISCAN 0.4 MG/5 ML SYRG        LOSARTAN (COZAAR) 50 MG TABLET    Take 1 tablet (50 mg total) by mouth once daily.    METFORMIN (GLUCOPHAGE) 1000 MG TABLET    TAKE 1 TABLET TWICE DAILY WITH MEALS    METOPROLOL SUCCINATE (TOPROL-XL) 50 MG 24 HR TABLET    TAKE 1 TABLET EVERY DAY    MOXIFLOXACIN (VIGAMOX) 0.5 % OPHTHALMIC SOLUTION        MULTIVITAMIN (THERAGRAN) PER TABLET    Take 1 tablet by mouth once daily.    MUPIROCIN (BACTROBAN) 2 % OINTMENT    Apply topically 3 (three) times daily. for 7 days    OXYCODONE-ACETAMINOPHEN (PERCOCET) 5-325 MG PER TABLET    Take 1 tablet by mouth every 8 (eight) hours as needed.    PAPAVERINE 30 MG/ML INJECTION    Inject 0.3 ml of trimix solution prn ED max once daily  Prepare 10ml of trimix solution containing:    Papaverine 30mg/ml    Phentolamine 1  "mg/ml    Alprostadil 10mcg/ml  Dispense 10ml per refill  Qs syringes 1cc/30g/0.5" and alcohol swabs dispense as needed for Intracavernosal injection    PRAVASTATIN (PRAVACHOL) 40 MG TABLET    Take 1 tablet (40 mg total) by mouth once daily.    PREDNISOLONE ACETATE (PRED FORTE) 1 % DRPS        SILDENAFIL (VIAGRA) 100 MG TABLET    Take 1 tablet (100 mg total) by mouth daily as needed for Erectile Dysfunction.    TAMSULOSIN (FLOMAX) 0.4 MG CAP    Take 1 capsule (0.4 mg total) by mouth once daily.     Objective:     Vitals:    12/13/22 1404   BP: (!) 104/57   Pulse: 86   Temp: 98.1 °F (36.7 °C)     Lab Results   Component Value Date    WBC 4.08 12/06/2022    HGB 12.7 (L) 12/06/2022    HCT 37.9 (L) 12/06/2022    MCV 88 12/06/2022     12/06/2022     Lab Results   Component Value Date    IRON 117 12/06/2022    TIBC 280 12/06/2022    FERRITIN 63 12/06/2022     BMP  Lab Results   Component Value Date     12/06/2022    K 4.3 12/06/2022     12/06/2022    CO2 23 12/06/2022    BUN 17 12/06/2022    CREATININE 1.4 12/06/2022    CALCIUM 8.6 (L) 12/06/2022    ANIONGAP 12 12/06/2022    ESTGFRAFRICA 43 (A) 06/01/2022    EGFRNONAA 38 (A) 06/01/2022     Lab Results   Component Value Date    ALT 18 12/06/2022    AST 26 12/06/2022    ALKPHOS 52 (L) 12/06/2022    BILITOT 0.5 12/06/2022               Physical Exam  Vitals reviewed.   Constitutional:       General: He is not in acute distress.     Appearance: He is well-developed.   HENT:      Head: Normocephalic.      Right Ear: External ear normal.      Left Ear: External ear normal.   Eyes:      General: Lids are normal.         Right eye: No discharge.         Left eye: No discharge.      Conjunctiva/sclera: Conjunctivae normal.   Neck:      Thyroid: No thyroid mass.   Cardiovascular:      Rate and Rhythm: Normal rate and regular rhythm.      Heart sounds: Normal heart sounds. No murmur heard.    No friction rub. No gallop.   Pulmonary:      Effort: Pulmonary effort is " normal. No respiratory distress.      Breath sounds: Normal breath sounds. No wheezing or rales.   Chest:      Chest wall: No tenderness.   Abdominal:      General: Bowel sounds are normal. There is no distension.      Palpations: Abdomen is soft.   Genitourinary:     Comments: deferred  Musculoskeletal:         General: Normal range of motion.      Cervical back: Normal range of motion.      Comments: Patient walks with a cane.    Lymphadenopathy:      Head:      Right side of head: No submandibular, preauricular or posterior auricular adenopathy.      Left side of head: No submandibular, preauricular or posterior auricular adenopathy.   Skin:     General: Skin is warm and dry.   Neurological:      Mental Status: He is alert and oriented to person, place, and time.   Psychiatric:         Speech: Speech normal.         Behavior: Behavior normal. Behavior is cooperative.         Thought Content: Thought content normal.        Assessment:     Problem List Items Addressed This Visit          Oncology    Chronic anemia     Anemia multifactorial including CKD, chronic disease, sickle cell trait    Laboratory review overall stable. He remains anemic but hemoglobin remains near baseline hemoglobin 12.7. His baseline is 11-12 range. Anemia workup has been essentially unremarkable. Vitamin B12 on the lower end of normal at pervious visit. He was asked to take sublingual Vitamin B12 1,000 mcg daily.     Previous Referral to Nephrology for CKD III. Appointment cancelled due to COVID concerns.  He has not yet had Nephrology follow up    EGD/Colonoscopy done 6/2020. EGD revealed gastritis with pathology positive for H. Pylori. Colonoscopy reveal polyps with unremarkable pathology, recommended repeat in 5 years    Continue B12 supplementation. Patient reports noting constipation. Trial off oral iron supplementation. F/u 6 months with repeat labs           Sickle cell trait     -likely etiology of chronic mild anemia               Med Onc Chart Routing      Follow up with physician    Follow up with J[facility]ON 6 months.   Infusion scheduling note    Injection scheduling note    Labs CBC, ferritin and iron and TIBC   Lab interval:  1-2 days prior to appointment   Imaging None      Pharmacy appointment No pharmacy appointment needed      Other referrals No additional referrals needed           Plan:     Sickle cell trait    Chronic anemia                SANGEETHA YuenP-C

## 2022-12-16 ENCOUNTER — OFFICE VISIT (OUTPATIENT)
Dept: PODIATRY | Facility: CLINIC | Age: 82
End: 2022-12-16
Payer: MEDICARE

## 2022-12-16 VITALS — BODY MASS INDEX: 27.96 KG/M2 | WEIGHT: 224.88 LBS | HEIGHT: 75 IN

## 2022-12-16 DIAGNOSIS — G89.29 CHRONIC TOE PAIN, LEFT FOOT: Primary | ICD-10-CM

## 2022-12-16 DIAGNOSIS — M79.675 CHRONIC TOE PAIN, LEFT FOOT: Primary | ICD-10-CM

## 2022-12-16 DIAGNOSIS — E11.51 TYPE 2 DIABETES MELLITUS WITH DIABETIC PERIPHERAL ANGIOPATHY WITHOUT GANGRENE, WITHOUT LONG-TERM CURRENT USE OF INSULIN: ICD-10-CM

## 2022-12-16 DIAGNOSIS — E11.42 TYPE 2 DIABETES MELLITUS WITH DIABETIC POLYNEUROPATHY, WITHOUT LONG-TERM CURRENT USE OF INSULIN: ICD-10-CM

## 2022-12-16 DIAGNOSIS — L97.511: ICD-10-CM

## 2022-12-16 DIAGNOSIS — S92.535A CLOSED NONDISPLACED FRACTURE OF DISTAL PHALANX OF LESSER TOE OF LEFT FOOT, INITIAL ENCOUNTER: Primary | ICD-10-CM

## 2022-12-16 PROCEDURE — 3288F FALL RISK ASSESSMENT DOCD: CPT | Mod: CPTII,S$GLB,, | Performed by: PODIATRIST

## 2022-12-16 PROCEDURE — 1125F AMNT PAIN NOTED PAIN PRSNT: CPT | Mod: CPTII,S$GLB,, | Performed by: PODIATRIST

## 2022-12-16 PROCEDURE — 3288F PR FALLS RISK ASSESSMENT DOCUMENTED: ICD-10-PCS | Mod: CPTII,S$GLB,, | Performed by: PODIATRIST

## 2022-12-16 PROCEDURE — 1101F PT FALLS ASSESS-DOCD LE1/YR: CPT | Mod: CPTII,S$GLB,, | Performed by: PODIATRIST

## 2022-12-16 PROCEDURE — 1160F RVW MEDS BY RX/DR IN RCRD: CPT | Mod: CPTII,S$GLB,, | Performed by: PODIATRIST

## 2022-12-16 PROCEDURE — 1159F PR MEDICATION LIST DOCUMENTED IN MEDICAL RECORD: ICD-10-PCS | Mod: CPTII,S$GLB,, | Performed by: PODIATRIST

## 2022-12-16 PROCEDURE — 99999 PR PBB SHADOW E&M-EST. PATIENT-LVL IV: CPT | Mod: PBBFAC,,, | Performed by: PODIATRIST

## 2022-12-16 PROCEDURE — 99214 PR OFFICE/OUTPT VISIT, EST, LEVL IV, 30-39 MIN: ICD-10-PCS | Mod: S$GLB,,, | Performed by: PODIATRIST

## 2022-12-16 PROCEDURE — 3072F LOW RISK FOR RETINOPATHY: CPT | Mod: CPTII,S$GLB,, | Performed by: PODIATRIST

## 2022-12-16 PROCEDURE — 1160F PR REVIEW ALL MEDS BY PRESCRIBER/CLIN PHARMACIST DOCUMENTED: ICD-10-PCS | Mod: CPTII,S$GLB,, | Performed by: PODIATRIST

## 2022-12-16 PROCEDURE — 99999 PR PBB SHADOW E&M-EST. PATIENT-LVL IV: ICD-10-PCS | Mod: PBBFAC,,, | Performed by: PODIATRIST

## 2022-12-16 PROCEDURE — 3072F PR LOW RISK FOR RETINOPATHY: ICD-10-PCS | Mod: CPTII,S$GLB,, | Performed by: PODIATRIST

## 2022-12-16 PROCEDURE — 1125F PR PAIN SEVERITY QUANTIFIED, PAIN PRESENT: ICD-10-PCS | Mod: CPTII,S$GLB,, | Performed by: PODIATRIST

## 2022-12-16 PROCEDURE — 99214 OFFICE O/P EST MOD 30 MIN: CPT | Mod: S$GLB,,, | Performed by: PODIATRIST

## 2022-12-16 PROCEDURE — 1101F PR PT FALLS ASSESS DOC 0-1 FALLS W/OUT INJ PAST YR: ICD-10-PCS | Mod: CPTII,S$GLB,, | Performed by: PODIATRIST

## 2022-12-16 PROCEDURE — 1159F MED LIST DOCD IN RCRD: CPT | Mod: CPTII,S$GLB,, | Performed by: PODIATRIST

## 2022-12-16 NOTE — PROGRESS NOTES
Subjective:       Patient ID: Srinath Mcknight is a 82 y.o. male.    Chief Complaint: Follow-up (Coming in for a f/u on broken toes on left foot (1st and 2nd digits), and right great toe has a blister on it that may be infected, rates pain 4/10, diabetic, wearing socks and shoes,last seen LAUREN Martinez on 12/12/2022.)      HPI:  Srinath Mcknight presents to the office today at the referral of BIJAN Murray after patient sustained injury while falling out of bed.  He relates that he has subsequently developed a fracture to the left great toe and also developed a blister to the right great toe which ultimately resulted and the nail being removed.  Was seen in urgent care and x-rays were taken.  States having 4/10 pain. Patient's PMD is Yeison Wilder MD.     Review of patient's allergies indicates:   Allergen Reactions    Sulfa (sulfonamide antibiotics)      Can't recall from 1995       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    Trouble in sleeping     Type 2 diabetes mellitus with diabetic polyneuropathy, without long-term current use of insulin     Urinary incontinence     Uses hearing aid     bilat       Family History   Problem Relation Age of Onset    Hypertension Mother     Heart disease Mother     Diabetes Mother     Hypertension Father     Heart disease Father     Diabetes Father     Stroke Father     Diabetes Sister      Cancer Brother 50        pancreas    Drug abuse Brother     Prostate cancer Neg Hx     Macular degeneration Neg Hx     Retinal detachment Neg Hx     Strabismus Neg Hx     Glaucoma Neg Hx     Blindness Neg Hx     Amblyopia Neg Hx     Kidney disease Neg Hx     Mental illness Neg Hx     Mental retardation Neg Hx     COPD Neg Hx     Asthma Neg Hx        Social History     Socioeconomic History    Marital status:     Number of children: 0    Highest education level: Master's degree (e.g., MA, MS, Olegario, MEd, MSW, CARMEL)   Occupational History    Occupation: retired     Comment: teacher   Tobacco Use    Smoking status: Never    Smokeless tobacco: Never   Substance and Sexual Activity    Alcohol use: Yes     Alcohol/week: 1.0 standard drink     Types: 1 Glasses of wine per week     Comment: rarely  No alcohol prior to surgery    Drug use: No    Sexual activity: Yes     Partners: Female     Birth control/protection: Condom   Social History Narrative    . No children. Retired but some part time work - 4 hours a day. 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 Caffeine intake - sugar free cola, Rare coffee intake. Still drives. Does have a Living Will . Has a nephew Jt Gayle, lives in [place]. Has a friend Karina Rossi, locally who could help him.        Past Surgical History:   Procedure Laterality Date    BACK SURGERY  2019    COLONOSCOPY N/A 6/30/2020    Procedure: COLONOSCOPY;  Surgeon: Joseph Iraheta MD;  Location: [facility];  Service: Endoscopy;  Laterality: N/A;    ESOPHAGOGASTRODUODENOSCOPY N/A 6/30/2020    Procedure: EGD (ESOPHAGOGASTRODUODENOSCOPY);  Surgeon: Joseph Iraheta MD;  Location: [facility];  Service: Endoscopy;  Laterality: N/A;    HERNIA REPAIR Bilateral 04/18/2017    JOINT REPLACEMENT Left 10/09/2017    L YAHIR Dr. Alcocer    LAMINECTOMY  07/22/2016    left elbow  10/2017    procedure to remove gout    lumbar foraminotomy  03/2018    REVISION TOTAL HIP ARTHROPLASTY Left 9/11/2018  "   Procedure: REVISION, TOTAL ARTHROPLASTY, HIP;  Surgeon: Albaro Alcocer Sr., MD;  Location: [facility];  Service: Orthopedics;  Laterality: Left;    ROTATOR CUFF REPAIR Left 2006    SHOULDER ARTHROSCOPY W/ ROTATOR CUFF REPAIR Right 2009       Review of Systems       Objective:   Ht 6' 3" (1.905 m)   Wt 102 kg (224 lb 13.9 oz)   BMI 28.11 kg/m²     X-Ray Toe 2 or More Views Right  Narrative: EXAMINATION:  XR TOE 2 OR MORE VIEWS RIGHT    CLINICAL HISTORY:  Unspecified injury of right foot, initial encounter    TECHNIQUE:  Two views of the right toes were performed    COMPARISON:  None    FINDINGS:  There is no radiographic evidence of acute osseous, articular, or soft tissue abnormality.  Moderate degenerative findings are present at the 1st MTP joint with suspected hallux valgus deformity.  Impression: As above.  No acute findings.    Electronically signed by: Jonathon Contreras MD  Date:    12/12/2022  Time:    15:21  X-Ray Toe 2 or More Views Left  Narrative: EXAMINATION:  XR TOE 2 OR MORE VIEWS LEFT    CLINICAL HISTORY:  Unspecified injury of left foot, initial encounter    TECHNIQUE:  AP and lateral views of the left great toe were performed    COMPARISON:  12/17/2018    FINDINGS:  There is an obliquely oriented linear lucency seen involving the base of the 1st distal phalanx medially which is concerning for possible nondisplaced fracture.  Recommend clinical correlation for site of point tenderness.  This lucency closely approximates the articular surface at the IP joint.  No evidence of dislocation.  Mild degenerative findings are present at the 1st MTP joint with suspected hallux valgus deformity.  Impression: Possible nondisplaced fracture of the 1st distal phalanx as above    Electronically signed by: Jonathon Contreras MD  Date:    12/12/2022  Time:    15:21       Physical Exam  LOWER EXTREMITY PHYSICAL EXAMINATION    VASCULAR:  The right dorsalis pedis pulse 2/4 and the right posterior tibial pulse 1/4.  The " left dorsalis pedis pulse 2/4 and posterior tibial pulse on the left is 1/4.  Capillary refill is intact.  Pedal hair growth decreased.     NEUROLOGY:  Protective sensation is intact with Hyannis Kyra monofilament. Proprioception is intact. Intact sensation to light touch.  Vibratory sensation is diminished to the 1st metatarsal phalangeal joint.     DERMATOLOGY:  There is some ecchymotic changes present to the left great toe most specifically on the medial aspect of the distal phalanx.  On the distal aspect of the right great toe, the right hallux nail has been avulsed dramatically and patient does have a wound present to the distal aspect of the great toe which is limited to skin.  There is no acute signs of infection.  Skin surrounding the blister slightly macerated.  Does not probe or deep soft tissue    ORTHOPEDIC:  Mild pain on palpation of the right 1st metatarsophalangeal joint and the distal aspect of the left great toe at the distal phalanx.  Ambulating with a cane for mobility    Assessment:     1. Closed nondisplaced fracture of distal phalanx of lesser toe of left foot, initial encounter    2. Skin ulcer of great toe, limited to breakdown of skin, right    3. Type 2 diabetes mellitus with diabetic polyneuropathy, without long-term current use of insulin    4. Type 2 diabetes mellitus with diabetic peripheral angiopathy without gangrene, without long-term current use of insulin        Plan:     Closed nondisplaced fracture of distal phalanx of lesser toe of left foot, initial encounter    Skin ulcer of great toe, limited to breakdown of skin, right    Type 2 diabetes mellitus with diabetic polyneuropathy, without long-term current use of insulin    Type 2 diabetes mellitus with diabetic peripheral angiopathy without gangrene, without long-term current use of insulin        Thorough discussion is had with the patient today, concerning the diagnosis, its etiology, and the treatment algorithm at  present.    X-ray of the left foot was reviewed by myself patient room.  Appears show a nondisplaced transverse fracture of the distal phalanx.  I do encourage patient to ambulate in a postoperative shoe but would recommend against nathalie taping as this could lead to hinging of the fracture fragment actually making this worse.  He will follow-up in 4 weeks with repeat x-rays on the left foot.    In regards to the blister formation and history of traumatic avulsion to the right great toe, would recommend patient utilize Betadine moist gauze and paper tape to be secure 2 times per day.  Would recommend against antibiotic cream as there is no acute signs of infection.  The cream is actually leading to the maceration and this will be better suited for healing is thin dry appropriately as it does not progress into the deep subcutaneous tissue.     Thorough discussion is had with the patient this afternoon, concerning the diagnosis, its etiology, and the treatment algorithm at present.  Greater than 50% of this visit spent on counseling and coordination of care. Greater than 15 minutes of a 20 minute appointment spent on education about the diabetic foot, neuropathy, and prevention of limb loss.  Shoe inspection. Diabetic Foot Education. Patient reminded of the importance of good nutrition and blood sugar control to help prevent podiatric complications of diabetes. Patient instructed on proper foot hygeine. We discussed wearing proper and supportive shoe gear, daily foot inspections, never walking barefooted or sock footed, never putting sharp instruments to feet which can cause major complications associated with infection, ulcers, lacerations.      Future Appointments   Date Time Provider Department Center   12/29/2022  9:40 AM Yeison Wilder MD [facility] IM [place]   1/20/2023 10:15 AM [facility] XR1 [facility] XRAY [place]   1/20/2023 10:45 AM Chrissy Quigley DPM [facility] POD [place]   2/3/2023 10:00 AM Trent NAVARRO  MD [place] [facility] DERM [place]   2/8/2023  9:20 AM Martin Machuca MD [facility] PUL[facility]   2/16/2023  9:00 AM Hossein Brooks MD [facility] INPN [place]   4/18/2023 11:20 AM Yasmine Calvillo MD [facility] CARDIO [place]   11/6/2023  8:00 AM [facility] US2 ONL ULSOUND [place]   11/13/2023 10:40 AM Debo Nguyễn NP [facility] UROLOGY [facility]

## 2022-12-29 ENCOUNTER — OFFICE VISIT (OUTPATIENT)
Dept: INTERNAL MEDICINE | Facility: CLINIC | Age: 82
End: 2022-12-29
Payer: MEDICARE

## 2022-12-29 VITALS
HEIGHT: 75 IN | WEIGHT: 221.56 LBS | BODY MASS INDEX: 27.55 KG/M2 | SYSTOLIC BLOOD PRESSURE: 124 MMHG | DIASTOLIC BLOOD PRESSURE: 78 MMHG | HEART RATE: 75 BPM | TEMPERATURE: 98 F

## 2022-12-29 DIAGNOSIS — E11.69 COMBINED HYPERLIPIDEMIA ASSOCIATED WITH TYPE 2 DIABETES MELLITUS: Chronic | ICD-10-CM

## 2022-12-29 DIAGNOSIS — E11.59 HYPERTENSION ASSOCIATED WITH DIABETES: ICD-10-CM

## 2022-12-29 DIAGNOSIS — E11.42 TYPE 2 DIABETES MELLITUS WITH DIABETIC POLYNEUROPATHY, WITHOUT LONG-TERM CURRENT USE OF INSULIN: Primary | Chronic | ICD-10-CM

## 2022-12-29 DIAGNOSIS — E78.2 COMBINED HYPERLIPIDEMIA ASSOCIATED WITH TYPE 2 DIABETES MELLITUS: Chronic | ICD-10-CM

## 2022-12-29 DIAGNOSIS — I15.2 HYPERTENSION ASSOCIATED WITH DIABETES: ICD-10-CM

## 2022-12-29 PROCEDURE — 99214 PR OFFICE/OUTPT VISIT, EST, LEVL IV, 30-39 MIN: ICD-10-PCS | Mod: HCNC,S$GLB,, | Performed by: INTERNAL MEDICINE

## 2022-12-29 PROCEDURE — 1159F MED LIST DOCD IN RCRD: CPT | Mod: HCNC,CPTII,S$GLB, | Performed by: INTERNAL MEDICINE

## 2022-12-29 PROCEDURE — 3074F SYST BP LT 130 MM HG: CPT | Mod: HCNC,CPTII,S$GLB, | Performed by: INTERNAL MEDICINE

## 2022-12-29 PROCEDURE — 1126F AMNT PAIN NOTED NONE PRSNT: CPT | Mod: HCNC,CPTII,S$GLB, | Performed by: INTERNAL MEDICINE

## 2022-12-29 PROCEDURE — 3074F PR MOST RECENT SYSTOLIC BLOOD PRESSURE < 130 MM HG: ICD-10-PCS | Mod: HCNC,CPTII,S$GLB, | Performed by: INTERNAL MEDICINE

## 2022-12-29 PROCEDURE — 1160F PR REVIEW ALL MEDS BY PRESCRIBER/CLIN PHARMACIST DOCUMENTED: ICD-10-PCS | Mod: HCNC,CPTII,S$GLB, | Performed by: INTERNAL MEDICINE

## 2022-12-29 PROCEDURE — 3072F PR LOW RISK FOR RETINOPATHY: ICD-10-PCS | Mod: HCNC,CPTII,S$GLB, | Performed by: INTERNAL MEDICINE

## 2022-12-29 PROCEDURE — 99499 UNLISTED E&M SERVICE: CPT | Mod: HCNC,S$GLB,, | Performed by: INTERNAL MEDICINE

## 2022-12-29 PROCEDURE — 99499 RISK ADDL DX/OHS AUDIT: ICD-10-PCS | Mod: HCNC,S$GLB,, | Performed by: INTERNAL MEDICINE

## 2022-12-29 PROCEDURE — 1100F PR PT FALLS ASSESS DOC 2+ FALLS/FALL W/INJURY/YR: ICD-10-PCS | Mod: HCNC,CPTII,S$GLB, | Performed by: INTERNAL MEDICINE

## 2022-12-29 PROCEDURE — 3078F DIAST BP <80 MM HG: CPT | Mod: HCNC,CPTII,S$GLB, | Performed by: INTERNAL MEDICINE

## 2022-12-29 PROCEDURE — 99214 OFFICE O/P EST MOD 30 MIN: CPT | Mod: HCNC,S$GLB,, | Performed by: INTERNAL MEDICINE

## 2022-12-29 PROCEDURE — 1126F PR PAIN SEVERITY QUANTIFIED, NO PAIN PRESENT: ICD-10-PCS | Mod: HCNC,CPTII,S$GLB, | Performed by: INTERNAL MEDICINE

## 2022-12-29 PROCEDURE — 3078F PR MOST RECENT DIASTOLIC BLOOD PRESSURE < 80 MM HG: ICD-10-PCS | Mod: HCNC,CPTII,S$GLB, | Performed by: INTERNAL MEDICINE

## 2022-12-29 PROCEDURE — 99999 PR PBB SHADOW E&M-EST. PATIENT-LVL V: CPT | Mod: PBBFAC,HCNC,, | Performed by: INTERNAL MEDICINE

## 2022-12-29 PROCEDURE — 3072F LOW RISK FOR RETINOPATHY: CPT | Mod: HCNC,CPTII,S$GLB, | Performed by: INTERNAL MEDICINE

## 2022-12-29 PROCEDURE — 1100F PTFALLS ASSESS-DOCD GE2>/YR: CPT | Mod: HCNC,CPTII,S$GLB, | Performed by: INTERNAL MEDICINE

## 2022-12-29 PROCEDURE — 3288F FALL RISK ASSESSMENT DOCD: CPT | Mod: HCNC,CPTII,S$GLB, | Performed by: INTERNAL MEDICINE

## 2022-12-29 PROCEDURE — 1159F PR MEDICATION LIST DOCUMENTED IN MEDICAL RECORD: ICD-10-PCS | Mod: HCNC,CPTII,S$GLB, | Performed by: INTERNAL MEDICINE

## 2022-12-29 PROCEDURE — 99999 PR PBB SHADOW E&M-EST. PATIENT-LVL V: ICD-10-PCS | Mod: PBBFAC,HCNC,, | Performed by: INTERNAL MEDICINE

## 2022-12-29 PROCEDURE — 1160F RVW MEDS BY RX/DR IN RCRD: CPT | Mod: HCNC,CPTII,S$GLB, | Performed by: INTERNAL MEDICINE

## 2022-12-29 PROCEDURE — 3288F PR FALLS RISK ASSESSMENT DOCUMENTED: ICD-10-PCS | Mod: HCNC,CPTII,S$GLB, | Performed by: INTERNAL MEDICINE

## 2022-12-29 RX ORDER — EMPAGLIFLOZIN 10 MG/1
TABLET, FILM COATED ORAL
COMMUNITY
Start: 2022-12-11

## 2022-12-29 NOTE — PATIENT INSTRUCTIONS
Patient Education       Guide to Eating When You Have Diabetes   About this topic   This guide will help you control your blood sugar along with exercise and the drugs you are taking. You want to have a balanced amount of carbohydrates, fats, and protein in your meal. Following these diet guidelines may also help control your blood pressure and cholesterol.     What will the results be?   When you follow these diet guidelines, your blood sugar may be easier to keep within the goal blood sugar ranges. Ask your doctor for your goal blood sugar ranges.  What changes to diet are needed?   You need to balance how much carbohydrate, fat, and protein is in your meals. You also need to control the amount or portion size of the food you eat. Eat meals at about the same time every day. Do not skip a meal. Talk to your dietitian about making a personal meal plan for you.     Who should use this diet?   This diet is helpful to people with high blood sugar or diabetes.   What foods are good to eat?   Whole grains like:  1/3 cup (80 grams) brown rice  1/3 cup (80 grams) wild rice  1/3 cup (80 grams) whole wheat pasta  1 slice whole wheat or whole grain bread  3/4 cup (180 grams) high-fiber cereal  1/2 cup (120 grams) cooked oatmeal  1/2 (120 grams) English muffin  Fruits and vegetables like:  1/2 cup (120 grams) sweet potatoes  1/2 cup (120 grams) cooked vegetables, like squash, green beans, cauliflower, carrots, and cabbage  1 cup (240 grams) raw vegetables or salad greens  1 small apples or oranges  1/2 cup (120 grams) unsweetened fruit juice  Proteins like:  1 ounce (30 grams) lean beef or pork  1 ounce (30 grams) chicken, skin removed  1 ounce (30 grams) turkey, skin removed  1 ounce (30 grams) fish  1 ounce (30 grams) low-fat cheese or lunch meat  1/2 cup (120 grams) cooked beans ? black, kidney, chickpeas, or lentils  1 whole egg  What foods should be limited or avoided?   High fat or processed foods  like:  Moya  Sausage  Hot dogs  Processed snacks  Fats and oils like:  Margarine  Salad dressings  Foods that are high in salt like:  Table salt  Salted breads, rolls, crackers  Commercially-prepared potatoes and vegetable mixes  Canned vegetables and juices  Canned soups  Smoked, cured, or salted meats  Starches that are not whole grain like:  White rice  White potatoes  French fries  Pasta  White bread  Sugary cereals  Instant oatmeal  Baked goods, pastries  Croissants  What can be done to prevent this health problem?   Type 1 diabetes is a lifelong problem and you cannot prevent it. Type 2 diabetes can be prevented or delayed with lifestyle changes. You can still lead a normal life. Diabetes can be managed through diet, exercise, and drugs. Family members and friends can help you practice good health behaviors.  When do I need to call the doctor?   Blood sugar level is above 240 for more than a day  Blood sugar level drops to less than 40  Abnormal urine test results  Trouble breathing  Very sleepy  Throw up more than once  Many loose stools  Questions about your diet plan  Helpful tips   Try to eat smaller portions of a healthy balanced diet throughout the day. Take time to plan your meals and snacks.  Where can I learn more?   American Diabetes Association  https://www.cdc.gov/diabetes/managing/eat-well/meal-plan-method.html   HelpGuide.org  http://www.helpguide.org/home-pages/healthy-eating.htm   HelpGuide.org  http://www.helpguide.org/articles/diet-weight-loss/diabetes-diet-and-food-tips.htm   Last Reviewed Date   2021-07-21  Consumer Information Use and Disclaimer   This information is not specific medical advice and does not replace information you receive from your health care provider. This is only a brief summary of general information. It does NOT include all information about conditions, illnesses, injuries, tests, procedures, treatments, therapies, discharge instructions or life-style choices that  may apply to you. You must talk with your health care provider for complete information about your health and treatment options. This information should not be used to decide whether or not to accept your health care providers advice, instructions or recommendations. Only your health care provider has the knowledge and training to provide advice that is right for you.   Copyright   Copyright © 2021 [employer] and its affiliates and/or licensors. All rights reserved.

## 2022-12-29 NOTE — PROGRESS NOTES
"Subjective:       Patient ID: Srinath Mcknight is a 82 y.o. male.    Chief Complaint: Follow-up     Patient is an 82-year-old male presenting today following up on his diabetes, hypertension, hyperlipidemia.  He indicates he has been doing well.  His most recent A1c is 6.3 demonstrating excellent control of his sugars.  He is tolerating the medications without adverse effects.      He has a history of hypertension which is also showing good control at this time.  No signs or symptoms of cardiac decompensation.      He is had a history of hyperlipidemia and he remains on statin therapy without issues.      He is following with Hematology for chronic low-grade anemia which is felt to be due to his sickle cell trait.  It has been stable over time.    Patient is showing his being due for his eye exam.  He states he saw Dr. Vickers for cataract surgery this past year but he does not recall having his diabetic eye exam.  He is historically seen [facility] for his eye exams.  He would like to resume that.    Review of Systems   Constitutional:  Negative for fever and unexpected weight change.   Respiratory:  Negative for cough, shortness of breath and wheezing.    Cardiovascular:  Negative for chest pain and palpitations.   Gastrointestinal:  Negative for constipation, diarrhea, nausea and vomiting.   Genitourinary:  Negative for dysuria and hematuria.     Objective:   /78   Pulse 75   Temp 97.9 °F (36.6 °C)   Ht 6' 3" (1.905 m)   Wt 100.5 kg (221 lb 9 oz)   BMI 27.69 kg/m²      Physical Exam  Vitals reviewed.   Constitutional:       Appearance: He is well-developed.   HENT:      Head: Normocephalic and atraumatic.      Right Ear: External ear normal.      Left Ear: External ear normal.   Eyes:      Pupils: Pupils are equal, round, and reactive to light.   Neck:      Thyroid: No thyromegaly.   Cardiovascular:      Rate and Rhythm: Normal rate and regular rhythm.      Heart sounds: Normal heart sounds. " No murmur heard.    No friction rub. No gallop.   Pulmonary:      Effort: Pulmonary effort is normal.      Breath sounds: Normal breath sounds. No wheezing or rales.   Abdominal:      General: Bowel sounds are normal. There is no distension.      Palpations: Abdomen is soft.      Tenderness: There is no abdominal tenderness.   Musculoskeletal:      Cervical back: Neck supple.   Psychiatric:         Mood and Affect: Mood normal.           Assessment:       1. Type 2 diabetes mellitus with diabetic polyneuropathy, without long-term current use of insulin    2. Hypertension associated with diabetes    3. Combined hyperlipidemia associated with type 2 diabetes mellitus          Plan:   Type 2 diabetes mellitus with diabetic polyneuropathy, without long-term current use of insulin  Diabetes continues to do well at this time.  Most recent a1c is     Lab Results   Component Value Date    HGBA1C 6.3 (H) 11/14/2022    .      We will continue the current regimen.  Focus on a low carbohydrate diet and consistent exercise.        Hypertension associated with diabetes  Blood pressure is under good control.  We will continue the current regimen.  Will work on regular aerobic exercise and a low salt diet.        Combined hyperlipidemia associated with type 2 diabetes mellitus  Cholesterol numbers look good.  We will continue the current regimen at this time.  Remain focused on low fat diet and high dietary fiber intake.    Type 2 diabetes mellitus with diabetic polyneuropathy, without long-term current use of insulin  -     Ambulatory referral/consult to Optometry; Future; Expected date: 01/05/2023  -     Hemoglobin A1C; Future; Expected date: 03/30/2023  -     Microalbumin/Creatinine Ratio, Urine; Future; Expected date: 03/30/2023    Hypertension associated with diabetes    Combined hyperlipidemia associated with type 2 diabetes mellitus          Follow up in about 14 weeks (around 4/6/2023) for DM, HTN, HLP, with Jose  York.

## 2022-12-29 NOTE — ASSESSMENT & PLAN NOTE
Diabetes continues to do well at this time.  Most recent a1c is     Lab Results   Component Value Date    HGBA1C 6.3 (H) 11/14/2022    .      We will continue the current regimen.  Focus on a low carbohydrate diet and consistent exercise.

## 2023-01-03 ENCOUNTER — OFFICE VISIT (OUTPATIENT)
Dept: OPHTHALMOLOGY | Facility: CLINIC | Age: 83
End: 2023-01-03
Payer: MEDICARE

## 2023-01-03 DIAGNOSIS — E11.9 DIABETES MELLITUS WITHOUT COMPLICATION: Primary | ICD-10-CM

## 2023-01-03 DIAGNOSIS — Z96.1 PSEUDOPHAKIA, BOTH EYES: ICD-10-CM

## 2023-01-03 DIAGNOSIS — E11.42 TYPE 2 DIABETES MELLITUS WITH DIABETIC POLYNEUROPATHY, WITHOUT LONG-TERM CURRENT USE OF INSULIN: Chronic | ICD-10-CM

## 2023-01-03 DIAGNOSIS — H52.223 REGULAR ASTIGMATISM OF BOTH EYES: ICD-10-CM

## 2023-01-03 PROCEDURE — 99999 PR PBB SHADOW E&M-EST. PATIENT-LVL III: ICD-10-PCS | Mod: PBBFAC,HCNC,, | Performed by: OPTOMETRIST

## 2023-01-03 PROCEDURE — 99999 PR PBB SHADOW E&M-EST. PATIENT-LVL III: CPT | Mod: PBBFAC,HCNC,, | Performed by: OPTOMETRIST

## 2023-01-03 PROCEDURE — 92015 PR REFRACTION: ICD-10-PCS | Mod: HCNC,S$GLB,, | Performed by: OPTOMETRIST

## 2023-01-03 PROCEDURE — 1159F MED LIST DOCD IN RCRD: CPT | Mod: HCNC,CPTII,S$GLB, | Performed by: OPTOMETRIST

## 2023-01-03 PROCEDURE — 2023F PR DILATED RETINAL EXAM W/O EVID OF RETINOPATHY: ICD-10-PCS | Mod: HCNC,CPTII,S$GLB, | Performed by: OPTOMETRIST

## 2023-01-03 PROCEDURE — 92015 DETERMINE REFRACTIVE STATE: CPT | Mod: HCNC,S$GLB,, | Performed by: OPTOMETRIST

## 2023-01-03 PROCEDURE — 1159F PR MEDICATION LIST DOCUMENTED IN MEDICAL RECORD: ICD-10-PCS | Mod: HCNC,CPTII,S$GLB, | Performed by: OPTOMETRIST

## 2023-01-03 PROCEDURE — 92014 COMPRE OPH EXAM EST PT 1/>: CPT | Mod: HCNC,S$GLB,, | Performed by: OPTOMETRIST

## 2023-01-03 PROCEDURE — 2023F DILAT RTA XM W/O RTNOPTHY: CPT | Mod: HCNC,CPTII,S$GLB, | Performed by: OPTOMETRIST

## 2023-01-03 PROCEDURE — 92014 PR EYE EXAM, EST PATIENT,COMPREHESV: ICD-10-PCS | Mod: HCNC,S$GLB,, | Performed by: OPTOMETRIST

## 2023-01-03 NOTE — PROGRESS NOTES
HPI     Diabetic Eye Exam            Comments: New Patient           Comments    Patient here today for yearly DM eye exam.  Diagnosed with diabetes in 1990  Lab Results       Component                Value               Date                       HGBA1C                   6.3 (H)             11/14/2022            Vision changes since last eye exam?: None noticed  Wears PAL glasses full-time     Any eye pain today: No    Other ocular symptoms: No    Interested in contact lens fitting today? No    1. DM  2. PCIOL OU          Last edited by Katelin Chacon, PCT on 1/3/2023  1:46 PM.            Assessment /Plan     For exam results, see Encounter Report.    Diabetes mellitus without complication  32  years, last A1c 6.3 Stressed importance of DM control to preserve vision. No diabetic retinopathy was seen in either eye today. Continue strict blood glucose control.  Reviewed importance of yearly dilated eye exams. Continue close care with Dr Wilder regarding diabetes.    Type 2 diabetes mellitus with diabetic polyneuropathy, without long-term current use of insulin  -     Ambulatory referral/consult to Optometry    Pseudophakia, both eyes  S/p CE/IOL at outside clinic, doing well. Observe.     Regular astigmatism of both eyes  Eyeglass Final Rx       Eyeglass Final Rx         Sphere Cylinder Axis Add    Right -0.75 +1.75 004 +2.50    Left +0.25 +1.00 178 +2.50      Type: PAL    Expiration Date: 1/3/2024                        RTC 1 yr for dilated eye exam or sooner if any changes to vision.   Discussed above and answered questions.

## 2023-01-20 ENCOUNTER — OFFICE VISIT (OUTPATIENT)
Dept: PODIATRY | Facility: CLINIC | Age: 83
End: 2023-01-20
Payer: MEDICARE

## 2023-01-20 ENCOUNTER — HOSPITAL ENCOUNTER (OUTPATIENT)
Dept: RADIOLOGY | Facility: HOSPITAL | Age: 83
Discharge: HOME | End: 2023-01-20
Attending: PODIATRIST
Payer: MEDICARE

## 2023-01-20 DIAGNOSIS — S92.535D CLOSED NONDISPLACED FRACTURE OF DISTAL PHALANX OF LESSER TOE OF LEFT FOOT WITH ROUTINE HEALING, SUBSEQUENT ENCOUNTER: Primary | ICD-10-CM

## 2023-01-20 DIAGNOSIS — G89.29 CHRONIC TOE PAIN, LEFT FOOT: ICD-10-CM

## 2023-01-20 DIAGNOSIS — E11.42 TYPE 2 DIABETES MELLITUS WITH DIABETIC POLYNEUROPATHY, WITHOUT LONG-TERM CURRENT USE OF INSULIN: ICD-10-CM

## 2023-01-20 DIAGNOSIS — M79.675 CHRONIC TOE PAIN, LEFT FOOT: ICD-10-CM

## 2023-01-20 PROCEDURE — 99999 PR PBB SHADOW E&M-EST. PATIENT-LVL III: ICD-10-PCS | Mod: PBBFAC,HCNC,, | Performed by: PODIATRIST

## 2023-01-20 PROCEDURE — 73630 XR FOOT COMPLETE 3 VIEW LEFT: ICD-10-PCS | Mod: 26,HCNC,LT, | Performed by: STUDENT IN AN ORGANIZED HEALTH CARE EDUCATION/TRAINING PROGRAM

## 2023-01-20 PROCEDURE — 3288F PR FALLS RISK ASSESSMENT DOCUMENTED: ICD-10-PCS | Mod: HCNC,CPTII,S$GLB, | Performed by: PODIATRIST

## 2023-01-20 PROCEDURE — 1101F PR PT FALLS ASSESS DOC 0-1 FALLS W/OUT INJ PAST YR: ICD-10-PCS | Mod: HCNC,CPTII,S$GLB, | Performed by: PODIATRIST

## 2023-01-20 PROCEDURE — 73630 X-RAY EXAM OF FOOT: CPT | Mod: TC,HCNC,FY,PO,LT

## 2023-01-20 PROCEDURE — 99213 OFFICE O/P EST LOW 20 MIN: CPT | Mod: HCNC,S$GLB,, | Performed by: PODIATRIST

## 2023-01-20 PROCEDURE — 73630 X-RAY EXAM OF FOOT: CPT | Mod: 26,HCNC,LT, | Performed by: STUDENT IN AN ORGANIZED HEALTH CARE EDUCATION/TRAINING PROGRAM

## 2023-01-20 PROCEDURE — 3288F FALL RISK ASSESSMENT DOCD: CPT | Mod: HCNC,CPTII,S$GLB, | Performed by: PODIATRIST

## 2023-01-20 PROCEDURE — 1159F PR MEDICATION LIST DOCUMENTED IN MEDICAL RECORD: ICD-10-PCS | Mod: HCNC,CPTII,S$GLB, | Performed by: PODIATRIST

## 2023-01-20 PROCEDURE — 1159F MED LIST DOCD IN RCRD: CPT | Mod: HCNC,CPTII,S$GLB, | Performed by: PODIATRIST

## 2023-01-20 PROCEDURE — 1101F PT FALLS ASSESS-DOCD LE1/YR: CPT | Mod: HCNC,CPTII,S$GLB, | Performed by: PODIATRIST

## 2023-01-20 PROCEDURE — 1160F PR REVIEW ALL MEDS BY PRESCRIBER/CLIN PHARMACIST DOCUMENTED: ICD-10-PCS | Mod: HCNC,CPTII,S$GLB, | Performed by: PODIATRIST

## 2023-01-20 PROCEDURE — 99999 PR PBB SHADOW E&M-EST. PATIENT-LVL III: CPT | Mod: PBBFAC,HCNC,, | Performed by: PODIATRIST

## 2023-01-20 PROCEDURE — 99213 PR OFFICE/OUTPT VISIT, EST, LEVL III, 20-29 MIN: ICD-10-PCS | Mod: HCNC,S$GLB,, | Performed by: PODIATRIST

## 2023-01-20 PROCEDURE — 1160F RVW MEDS BY RX/DR IN RCRD: CPT | Mod: HCNC,CPTII,S$GLB, | Performed by: PODIATRIST

## 2023-01-20 NOTE — PROGRESS NOTES
Subjective:       Patient ID: Srinath Mcknight is a 82 y.o. male.    Chief Complaint: Toe Injury (Patient present with fracture to left toe. HE denies pain at present and is wearing post op shoe and ambulating with walking cane. )      HPI:  Srinath Mcknight presents to the office today follow-up on injury while falling out of bed.  In a postoperative shoe due to fracture to the left great toe.  States 0/10 pain.  Has been ambulating in a postoperative shoe since the injury.  States significant improvement in his discomfort.  No recent trauma or injury.  Does state that he has been increasing his exercise routine. Patient's PMD is Yeison Wilder MD.     Review of patient's allergies indicates:   Allergen Reactions    Sulfa (sulfonamide antibiotics)      Can't recall from 1995       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    Trouble in sleeping     Type 2 diabetes mellitus with diabetic polyneuropathy, without long-term current use of insulin     Urinary incontinence     Uses hearing aid     bilat       Family History   Problem Relation Age of Onset    Hypertension Mother     Heart disease Mother     Diabetes Mother     Hypertension Father     Heart disease Father     Diabetes Father     Stroke Father     Diabetes Sister     Cancer Brother 50        pancreas    Drug abuse Brother     Prostate cancer Neg Hx     Macular degeneration Neg Hx      Retinal detachment Neg Hx     Strabismus Neg Hx     Glaucoma Neg Hx     Blindness Neg Hx     Amblyopia Neg Hx     Kidney disease Neg Hx     Mental illness Neg Hx     Mental retardation Neg Hx     COPD Neg Hx     Asthma Neg Hx        Social History     Socioeconomic History    Marital status:     Number of children: 0    Highest education level: Master's degree (e.g., MA, MS, Olegario, MEd, MSW, CARMEL)   Occupational History    Occupation: retired     Comment: teacher   Tobacco Use    Smoking status: Never    Smokeless tobacco: Never   Substance and Sexual Activity    Alcohol use: Yes     Alcohol/week: 1.0 standard drink     Types: 1 Glasses of wine per week     Comment: rarely  No alcohol prior to surgery    Drug use: No    Sexual activity: Yes     Partners: Female     Birth control/protection: Condom   Social History Narrative    . No children. Retired but some part time work - 4 hours a day. 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 Caffeine intake - sugar free cola, Rare coffee intake. Still drives. Does have a Living Will . Has a nephew Jt Gayle, lives in [place]. Has a friend Karina Rossi, locally who could help him.        Past Surgical History:   Procedure Laterality Date    BACK SURGERY  2019    COLONOSCOPY N/A 6/30/2020    Procedure: COLONOSCOPY;  Surgeon: Joseph Iraheta MD;  Location: [facility];  Service: Endoscopy;  Laterality: N/A;    ESOPHAGOGASTRODUODENOSCOPY N/A 6/30/2020    Procedure: EGD (ESOPHAGOGASTRODUODENOSCOPY);  Surgeon: Joseph Iraheta MD;  Location: [facility];  Service: Endoscopy;  Laterality: N/A;    HERNIA REPAIR Bilateral 04/18/2017    JOINT REPLACEMENT Left 10/09/2017    L YAHIR Dr. Alcocer    LAMINECTOMY  07/22/2016    left elbow  10/2017    procedure to remove gout    lumbar foraminotomy  03/2018    REVISION TOTAL HIP ARTHROPLASTY Left 9/11/2018    Procedure: REVISION, TOTAL ARTHROPLASTY, HIP;  Surgeon: Albaor Alcocer Sr., MD;  Location: [facility];  Service:  Orthopedics;  Laterality: Left;    ROTATOR CUFF REPAIR Left 2006    SHOULDER ARTHROSCOPY W/ ROTATOR CUFF REPAIR Right 2009       Review of Systems       Objective:   There were no vitals taken for this visit.    X-Ray Foot Complete Left  Narrative: EXAMINATION:  Three radiographic views of the LEFT FOOT.    CLINICAL HISTORY:  Pain in left toe(s)    TECHNIQUE:  3 radiographic views of the LEFT FOOT.    COMPARISON:  Right foot radiograph 12/12/2022.    FINDINGS:  Three views of the left foot demonstrate normal alignment.  There is mild degenerative osteoarthritis in the 1st MTP joint.  There is no fracture.  There is no soft tissue swelling.  Impression: Mild degenerative osteoarthritis in the 1st MTP joint.    Electronically signed by: Abel Freed MD  Date:    01/20/2023  Time:    10:40       Physical Exam  LOWER EXTREMITY PHYSICAL EXAMINATION    ORTHOPEDIC:  No pain on palpation of the right 1st metatarsophalangeal joint and the distal aspect of the left great toe at the distal phalanx.  Ambulating with a cane for mobility    Assessment:     1. Closed nondisplaced fracture of distal phalanx of lesser toe of left foot with routine healing, subsequent encounter    2. Type 2 diabetes mellitus with diabetic polyneuropathy, without long-term current use of insulin        Plan:     Closed nondisplaced fracture of distal phalanx of lesser toe of left foot with routine healing, subsequent encounter    Type 2 diabetes mellitus with diabetic polyneuropathy, without long-term current use of insulin        Thorough discussion is had with the patient today, concerning the diagnosis, its etiology, and the treatment algorithm at present.    X-ray of the left foot was reviewed by myself patient room.  X-rays show a healing fracture to the distal phalanx of the great toe.  Based on patient's improvement with a postoperative shoe I feel that he is okay and safe to return to his regular shoe as tolerated.    Foot counseling and  education is provided at this visit. Patient is advised to wear socks and shoes at all times.  Do not walk barefoot, or with just socks, even when indoors.  Be sure to check and inspect the inside of the shoe before putting them on her feet.  Protect your feet at all times.  Walking shoes and/or athletic shoes are the best types of shoe gear. Do not wear vinyl or plastic type shoe gear, as they do not stretch and/or breathe.  Protect your feet from hot and/or cold. Elevate the extremities when sitting.  Do not wear excessively tight socks and/or shoe gear. Wiggle your toes for a few minutes throughout the day. Move your ankles up and down, in and out, to help blood flow in your lower extremity.     Future Appointments   Date Time Provider Department Center   2/3/2023 10:00 AM Trent Condon MD [facility] DERM [place]   2/8/2023  9:20 AM Martin Machuca MD [facility] PULMSVC  Medical C   2/16/2023  9:00 AM Hossein Brooks MD [facility] INPN [place]   3/30/2023  8:10 AM SPECIMEN, [place] [facility] SPECLAB [place]   3/30/2023  9:40 AM LABORATORY, [place] [facility] LAB [place]   4/6/2023  9:20 AM Jose Roger NP [facility] IM [place]   4/18/2023 11:20 AM Yasmine Calvillo MD [facility] CARDIO [place]   11/6/2023  8:00 AM [facility] US2 [facility] ULSOUND [place]   11/13/2023 10:40 AM Dbeo Nguyễn NP [facility] UROLOGY  Medical C

## 2023-02-03 ENCOUNTER — OFFICE VISIT (OUTPATIENT)
Dept: DERMATOLOGY | Facility: CLINIC | Age: 83
End: 2023-02-03
Payer: MEDICARE

## 2023-02-03 DIAGNOSIS — L29.9 PRURITUS: Primary | ICD-10-CM

## 2023-02-03 PROCEDURE — 1159F MED LIST DOCD IN RCRD: CPT | Mod: HCNC,CPTII,S$GLB, | Performed by: STUDENT IN AN ORGANIZED HEALTH CARE EDUCATION/TRAINING PROGRAM

## 2023-02-03 PROCEDURE — 1126F PR PAIN SEVERITY QUANTIFIED, NO PAIN PRESENT: ICD-10-PCS | Mod: HCNC,CPTII,S$GLB, | Performed by: STUDENT IN AN ORGANIZED HEALTH CARE EDUCATION/TRAINING PROGRAM

## 2023-02-03 PROCEDURE — 1160F PR REVIEW ALL MEDS BY PRESCRIBER/CLIN PHARMACIST DOCUMENTED: ICD-10-PCS | Mod: HCNC,CPTII,S$GLB, | Performed by: STUDENT IN AN ORGANIZED HEALTH CARE EDUCATION/TRAINING PROGRAM

## 2023-02-03 PROCEDURE — 99214 PR OFFICE/OUTPT VISIT, EST, LEVL IV, 30-39 MIN: ICD-10-PCS | Mod: HCNC,S$GLB,, | Performed by: STUDENT IN AN ORGANIZED HEALTH CARE EDUCATION/TRAINING PROGRAM

## 2023-02-03 PROCEDURE — 3288F FALL RISK ASSESSMENT DOCD: CPT | Mod: HCNC,CPTII,S$GLB, | Performed by: STUDENT IN AN ORGANIZED HEALTH CARE EDUCATION/TRAINING PROGRAM

## 2023-02-03 PROCEDURE — 99214 OFFICE O/P EST MOD 30 MIN: CPT | Mod: HCNC,S$GLB,, | Performed by: STUDENT IN AN ORGANIZED HEALTH CARE EDUCATION/TRAINING PROGRAM

## 2023-02-03 PROCEDURE — 1126F AMNT PAIN NOTED NONE PRSNT: CPT | Mod: HCNC,CPTII,S$GLB, | Performed by: STUDENT IN AN ORGANIZED HEALTH CARE EDUCATION/TRAINING PROGRAM

## 2023-02-03 PROCEDURE — 1159F PR MEDICATION LIST DOCUMENTED IN MEDICAL RECORD: ICD-10-PCS | Mod: HCNC,CPTII,S$GLB, | Performed by: STUDENT IN AN ORGANIZED HEALTH CARE EDUCATION/TRAINING PROGRAM

## 2023-02-03 PROCEDURE — 1101F PR PT FALLS ASSESS DOC 0-1 FALLS W/OUT INJ PAST YR: ICD-10-PCS | Mod: HCNC,CPTII,S$GLB, | Performed by: STUDENT IN AN ORGANIZED HEALTH CARE EDUCATION/TRAINING PROGRAM

## 2023-02-03 PROCEDURE — 99999 PR PBB SHADOW E&M-EST. PATIENT-LVL III: ICD-10-PCS | Mod: PBBFAC,HCNC,, | Performed by: STUDENT IN AN ORGANIZED HEALTH CARE EDUCATION/TRAINING PROGRAM

## 2023-02-03 PROCEDURE — 3288F PR FALLS RISK ASSESSMENT DOCUMENTED: ICD-10-PCS | Mod: HCNC,CPTII,S$GLB, | Performed by: STUDENT IN AN ORGANIZED HEALTH CARE EDUCATION/TRAINING PROGRAM

## 2023-02-03 PROCEDURE — 1101F PT FALLS ASSESS-DOCD LE1/YR: CPT | Mod: HCNC,CPTII,S$GLB, | Performed by: STUDENT IN AN ORGANIZED HEALTH CARE EDUCATION/TRAINING PROGRAM

## 2023-02-03 PROCEDURE — 1160F RVW MEDS BY RX/DR IN RCRD: CPT | Mod: HCNC,CPTII,S$GLB, | Performed by: STUDENT IN AN ORGANIZED HEALTH CARE EDUCATION/TRAINING PROGRAM

## 2023-02-03 PROCEDURE — 99999 PR PBB SHADOW E&M-EST. PATIENT-LVL III: CPT | Mod: PBBFAC,HCNC,, | Performed by: STUDENT IN AN ORGANIZED HEALTH CARE EDUCATION/TRAINING PROGRAM

## 2023-02-03 RX ORDER — PREDNISONE 5 MG/1
TABLET ORAL
Qty: 18 TABLET | Refills: 0 | Status: SHIPPED | OUTPATIENT
Start: 2023-02-03 | End: 2023-02-16 | Stop reason: SDUPTHER

## 2023-02-03 RX ORDER — MOMETASONE FUROATE 1 MG/G
CREAM TOPICAL DAILY
Qty: 50 G | Refills: 2 | Status: SHIPPED | OUTPATIENT
Start: 2023-02-03

## 2023-02-03 NOTE — PROGRESS NOTES
Subjective:       Patient ID:  Srinath Mcknight is a 82 y.o. male who presents for   Chief Complaint   Patient presents with    Itching     Follow up; itching on back and groin area; not progressing     History of Present Illness: The patient presents for follow up of generalized itching. Last seen on 12/8/22 where he was started on doxepin. Patient reports no improvement in itching symptoms. Continues to deny of visible rash or skin lesions, but continues to have a lot of itching all over, worse on the right side of the body. Using gentle soaps and detergent also with no improvement        Itching      Review of Systems   Constitutional:  Negative for fever and chills.   Skin:  Positive for itching.      Objective:    Physical Exam   Constitutional: He appears well-developed and well-nourished. No distress.   Neurological: He is alert and oriented to person, place, and time. He is not disoriented.   Psychiatric: He has a normal mood and affect.   Skin:   Areas Examined (abnormalities noted in diagram):   Head / Face Inspection Performed  Neck Inspection Performed  Chest / Axilla Inspection Performed  Abdomen Inspection Performed  Back Inspection Performed  RUE Inspected  LUE Inspection Performed            Diagram Legend     Erythematous scaling macule/papule c/w actinic keratosis       Vascular papule c/w angioma      Pigmented verrucoid papule/plaque c/w seborrheic keratosis      Yellow umbilicated papule c/w sebaceous hyperplasia      Irregularly shaped tan macule c/w lentigo     1-2 mm smooth white papules consistent with Milia      Movable subcutaneous cyst with punctum c/w epidermal inclusion cyst      Subcutaneous movable cyst c/w pilar cyst      Firm pink to brown papule c/w dermatofibroma      Pedunculated fleshy papule(s) c/w skin tag(s)      Evenly pigmented macule c/w junctional nevus     Mildly variegated pigmented, slightly irregular-bordered macule c/w mildly atypical nevus      Flesh colored to  evenly pigmented papule c/w intradermal nevus       Pink pearly papule/plaque c/w basal cell carcinoma      Erythematous hyperkeratotic cursted plaque c/w SCC      Surgical scar with no sign of skin cancer recurrence      Open and closed comedones      Inflammatory papules and pustules      Verrucoid papule consistent consistent with wart     Erythematous eczematous patches and plaques     Dystrophic onycholytic nail with subungual debris c/w onychomycosis     Umbilicated papule    Erythematous-base heme-crusted tan verrucoid plaque consistent with inflamed seborrheic keratosis     Erythematous Silvery Scaling Plaque c/w Psoriasis     See annotation      Assessment / Plan:        Pruritus - persistent pruritus symptoms diffusely, despite no cutaneous changes/findings. Will try a course of low dose prednisone taper and topical steroids. Okay to discontinue doxepin.   -     predniSONE (DELTASONE) 5 MG tablet; Take 3 tablets for 3 days, then 2 tablets for 3 days, then 1 tablet for 3 days.  Dispense: 18 tablet; Refill: 0  -     mometasone 0.1% (ELOCON) 0.1 % cream; Apply topically once daily.  Dispense: 50 g; Refill: 2  -     Counseled patient on gentle skin care regimen, including need for sensitive soaps/detergents, as well as need for frequent use of sensitize moisturizers.          Follow up in about 8 weeks (around 3/31/2023).

## 2023-02-07 DIAGNOSIS — Z00.00 ENCOUNTER FOR MEDICARE ANNUAL WELLNESS EXAM: ICD-10-CM

## 2023-02-07 NOTE — PROGRESS NOTES
Subjective:       Srinath Mcknight is a 82 y.o. male   Last visit was 07/26/2018  Has been doing overall well.  Ponce score 5. Recent revision hip Left  His major sleep issues a REM behavior disorder, PLMD and obstructive sleep apnea  With regard to obstructive sleep apnea and this is borderline patient has been reluctant to use CPAP in past.  REM behavioral events have been very infrequent less than once or twice in the month  He is stable on a regimen of clonidine 0.5 mg.  And melatonin   He is not taking Mirapex for his periodic limb movement  No cough no wheezing or shortness of breath  I have reviewed the patient's medical history in detail and updated the computerized patient record.    01/05/2022  Follow up visit to review tersts  C/o easily fatigue, DAMON   See cardiology , -ve w/u  Seen Dr Zimmer  ABG: Compensated resp alkalosis  6MWD:Reduced exercise capacity, 32.42%, Dyspnea wa grade 3, SpO2 100%, Peak BP and HR was 103 BPM, /64  PFT: Normal Spirometry:Mild restriction: DLCO mild reduced  Bed time:1130pm  Wake time 9 am  Appear to have stopped Klonopin and Melatonin while in hospital, says wife observed sleep reenactment activity    06/15/2022:  Last visit was 01/05/2022  Interested in Pul rehab: did not qualify last testing  No cough, No wheezing  SOB: frustrating after hip replacement and back surgery  PFT: low MVV and restriction  Not on Oxygen  Has GAYLA inhaler, not sure  regardinfg sleep issues  Stable on Klonopin and Melatonin  No reenactment    07/29/2022  Reviewed PFT and Walk  Improved from 32.4% to 44.21%  SNIFF was normal  Ponce 4  Accepted to pul rehab  PFT: restriction with reduced DLCO    02/08/2023  Last visit 07/29/2022  Snoring when sleep on backEpworth 7  Bed time 11 pm, wake time 11am  Taking Melatonin  No sleep related movements  Klonopin + Melatonin  No SOB,   Ponce 7  Has not walked out of bed recently  PSG 2017 reveiwed  Reluctant to wear CPAP in past   BMI increased  from 27.9 to 28  Increased snoring    EPWORTH SLEEPINESS SCALE 2/8/2023   Sitting and reading 1   Watching TV 1   Sitting, inactive in a public place (e.g. a theatre or a meeting) 1   As a passenger in a car for an hour without a break 2   Lying down to rest in the afternoon when circumstances permit 0   Sitting and talking to someone 0   Sitting quietly after a lunch without alcohol 2   In a car, while stopped for a few minutes in traffic 0   Total score 7        Previous Report(s) Reviewed: historical medical records and office notes     The following portions of the patient's history were reviewed and updated as appropriate:   He  has a past medical history of Anemia due to unknown mechanism (11/10/2015), Angina pectoris (2014), Arthritis, Back pain, Coronary artery disease, Diabetes mellitus with stage 3 chronic kidney disease (11/18/2020), DM (diabetes mellitus) (25-30 years), DM (diabetes mellitus), Encounter for blood transfusion, Gout (12/05/2017), History of blood transfusion, Hyperlipemia, Hypertension, CHARLES (obstructive sleep apnea), Polyneuropathy, Spinal cord disease, 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  He does not have any pertinent problems on file.  He  has a past surgical history that includes Rotator cuff repair (Left, 2006); Shoulder arthroscopy w/ rotator cuff repair (Right, 2009); Laminectomy (07/22/2016); Hernia repair (Bilateral, 04/18/2017); Joint replacement (Left, 10/09/2017); Back surgery (2019); lumbar foraminotomy (03/2018); left elbow (10/2017); Revision total hip arthroplasty (Left, 9/11/2018); Esophagogastroduodenoscopy (N/A, 6/30/2020); and Colonoscopy (N/A, 6/30/2020).  His family history includes Cancer (age of onset: 50) in his brother; Diabetes in his father, mother, and sister; Drug abuse in his  brother; Heart disease in his father and mother; Hypertension in his father and mother; Stroke in his father.  He  reports that he has never smoked. He has never used smokeless tobacco. He reports current alcohol use of about 1.0 standard drink per week. He reports that he does not use drugs.  He has a current medication list which includes the following prescription(s): albuterol, allopurinol, bisacodyl, blood glucose control, low, blood sugar diagnostic, blood-glucose meter, cetirizine, cyanocobalamin (vitamin b-12), doxepin, gabapentin, garlic extract, jardiance, lancets, lexiscan, losartan, metformin, metoprolol succinate, mometasone 0.1%, moxifloxacin, multivitamin, oxycodone-acetaminophen, papaverine, pravastatin, prednisolone acetate, prednisone, sildenafil, tamsulosin, and clonazepam.  Current Outpatient Medications on File Prior to Visit   Medication Sig Dispense Refill    albuterol (VENTOLIN HFA) 90 mcg/actuation inhaler Inhale 2 puffs into the lungs every 6 (six) hours as needed for Wheezing or Shortness of Breath. Rescue 18 g 3    allopurinoL (ZYLOPRIM) 100 MG tablet TAKE 1 TABLET EVERY DAY 90 tablet 3    bisacodyl (DULCOLAX) 10 mg Supp   0    blood glucose control, low Soln by Misc.(Non-Drug; Combo Route) route.      blood sugar diagnostic (TRUE METRIX GLUCOSE TEST STRIP) Strp Use as directed to check sugars 100 strip 11    blood-glucose meter Misc Use as directed to check sugars 1 each 0    cetirizine (ZYRTEC) 10 MG tablet TAKE 1 TABLET BY MOUTH EVERY DAY FOR 14 DAYS 30 tablet 1    cyanocobalamin, vitamin B-12, 1,000 mcg Subl Place 1,000 mcg under the tongue once daily. 90 tablet 3    doxepin (SINEQUAN) 10 MG capsule Take 1 capsule (10 mg total) by mouth every evening. 90 capsule 0    gabapentin (NEURONTIN) 600 MG tablet TAKE 1 TABLET EVERY DAY 90 tablet 1    GARLIC EXTRACT ORAL Take 1 tablet by mouth once daily. Per Negin Tyler [facility]      JARDIANCE 10 mg tablet       lancets Misc Use as directed to  "check sugars 100 each 11    LEXISCAN 0.4 mg/5 mL Syrg       losartan (COZAAR) 50 MG tablet Take 1 tablet (50 mg total) by mouth once daily. 90 tablet 3    metFORMIN (GLUCOPHAGE) 1000 MG tablet TAKE 1 TABLET TWICE DAILY WITH MEALS 180 tablet 3    metoprolol succinate (TOPROL-XL) 50 MG 24 hr tablet TAKE 1 TABLET EVERY DAY 90 tablet 0    mometasone 0.1% (ELOCON) 0.1 % cream Apply topically once daily. 50 g 2    moxifloxacin (VIGAMOX) 0.5 % ophthalmic solution       multivitamin (THERAGRAN) per tablet Take 1 tablet by mouth once daily.      oxyCODONE-acetaminophen (PERCOCET) 5-325 mg per tablet Take 1 tablet by mouth every 8 (eight) hours as needed.      papaverine 30 mg/mL injection Inject 0.3 ml of trimix solution prn ED max once daily  Prepare 10ml of trimix solution containing:    Papaverine 30mg/ml    Phentolamine 1 mg/ml    Alprostadil 10mcg/ml  Dispense 10ml per refill  Qs syringes 1cc/30g/0.5" and alcohol swabs dispense as needed for Intracavernosal injection 10 mL 5    pravastatin (PRAVACHOL) 40 MG tablet Take 1 tablet (40 mg total) by mouth once daily. 90 tablet 0    prednisoLONE acetate (PRED FORTE) 1 % DrpS       predniSONE (DELTASONE) 5 MG tablet Take 3 tablets for 3 days, then 2 tablets for 3 days, then 1 tablet for 3 days. 18 tablet 0    sildenafiL (VIAGRA) 100 MG tablet Take 1 tablet (100 mg total) by mouth daily as needed for Erectile Dysfunction. 30 tablet 2    tamsulosin (FLOMAX) 0.4 mg Cap Take 1 capsule (0.4 mg total) by mouth once daily. 90 capsule 3    clonazePAM (KLONOPIN) 0.5 MG tablet Take 1 tablet (0.5 mg total) by mouth every evening. 30 tablet 2     No current facility-administered medications on file prior to visit.     He is allergic to sulfa (sulfonamide antibiotics)..     Review of Systems   Constitutional:  Positive for malaise/fatigue.   Eyes: Negative.    Respiratory:          Snoring   Cardiovascular: Negative.    Genitourinary: Negative.    Musculoskeletal: Negative.  " "  Neurological: Negative.    Endo/Heme/Allergies: Negative.    Psychiatric/Behavioral:  Positive for memory loss.    All other systems reviewed and are negative.      Objective:      Vitals:    02/08/23 0933   BP: 126/80   Pulse: 85   Resp: 17   SpO2: 100%   Weight: 101.6 kg (223 lb 15.8 oz)   Height: 6' 3" (1.905 m)       Using cane    Physical Exam  Vitals and nursing note reviewed.   Constitutional:       Appearance: He is well-developed.   HENT:      Head: Normocephalic and atraumatic.      Nose: Nose normal.   Eyes:      General:         Left eye: No discharge.      Pupils: Pupils are equal, round, and reactive to light.   Neck:      Vascular: No JVD.   Cardiovascular:      Rate and Rhythm: Normal rate and regular rhythm.      Heart sounds: Normal heart sounds. No murmur heard.  Pulmonary:      Effort: Pulmonary effort is normal. No respiratory distress.   Abdominal:      General: Bowel sounds are normal.      Palpations: Abdomen is soft.   Musculoskeletal:         General: No deformity. Normal range of motion.      Cervical back: Normal range of motion and neck supple.   Skin:     General: Skin is warm and dry.   Neurological:      Mental Status: He is alert and oriented to person, place, and time.      Deep Tendon Reflexes: Reflexes are normal and symmetric.    Srinath Mcknight, who is a 76 year-old male patient, was evaluated for sleep breathing disorders in an all-night  polysomnogram on 3/1/2017. The patient's chief complaints are known diagnosis of REM behavior disorder. Currently on  clonazepam 0.25 and melatonin 5 mg. Still complaining of nonrestorative sleep Romeo sleepiness score 7.  SDI questionnaire was reviewed: Legs jerk frequently and regularly. Physical discomfort almost all the time. Violent  movement in sleep 15 days a month. Vivid dreams at night. Snoring, witnessed apnea, dry mouth.  Medications: Metformin, glipizide, pravastatin, clonazepam.  Patients perception: It took 1 hour to fall " asleep. This was longer than usual. Quality of sleep was worse than usual.  STUDY PARAMETERS: The study was performed with a sleep technologist in attendance for the entire test period.  Video monitoring was carried out throughout the study, and the following clinical parameters were recorded:  SUMMARY STATEMENTS:  1. Findings related to sleep diagnoses:  ? Moderate intermittent snoring  ? The overall AHI was 6.1 (38 events)  ? 95.4% of sleep was recorded in supine position  2. EEG abnormalities:  ? Absent slow-wave sleep. Sleep latency was normal. REM latency was delayed.  ? Increased chin EMG tone consistent with REM without Ledy  ? Severe periodic limb movements with significant arousal  ? Arousal index was very high: 58.0 events per hour.  3. ECG abnormalities:  ? Frequent PVCs and sinus bradycardia  ? Average heart rate was 62 bpm with a maximal heart rate 85 bpm  4. Behavioral observations:  a. Recording Tech Comments  Sleepwalking and sleep talking observed 02:01 AM.  REM without atonia and sleep talking observed during REM sleep at 03:47 AM  INTERPRETATION:  1. Borderline/mild obstructive sleep apnea.  2. 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)     Stable on Pravachol         CHARLES (obstructive sleep apnea) (Chronic)     Increased snoring  Weight gain  Repeat PSG         Relevant Orders    Polysomnogram (CPAP will be added if patient meets diagnostic criteria.)    Diffusion capacity of lung (dl), decreased - Primary    Type 2 diabetes mellitus with diabetic peripheral angiopathy without gangrene, without long-term current use of insulin     Stable JARDIANCE,         Controlled REM sleep behavior disorder     Continue Melatonin + Konopin         Relevant Orders    Polysomnogram (CPAP will be  added if patient meets diagnostic criteria.)    Restrictive ventilatory defect     TLC 77.9%           Plan:      Overall stable.   Continue current regime: Melatonin and Klonopin     Repeat Sleep study      Requested Prescriptions      No prescriptions requested or ordered in this encounter         Requested Prescriptions      No prescriptions requested or ordered in this encounter         Follow up in about 6 weeks (around 3/22/2023), or PSG, sleep hygeine.    This note was prepared using voice recognition system and is likely to have sound alike errors that may have been overlooked even after proof reading.  Please call me with any questions    Discussed diagnosis, its evaluation, treatment and usual course. All questions answered.    Thank you for the courtesy of participating in the care of this patient    Martin Machuca MD

## 2023-02-08 ENCOUNTER — OFFICE VISIT (OUTPATIENT)
Dept: PULMONOLOGY | Facility: CLINIC | Age: 83
End: 2023-02-08
Payer: MEDICARE

## 2023-02-08 VITALS
OXYGEN SATURATION: 100 % | WEIGHT: 224 LBS | HEART RATE: 85 BPM | RESPIRATION RATE: 17 BRPM | DIASTOLIC BLOOD PRESSURE: 80 MMHG | SYSTOLIC BLOOD PRESSURE: 126 MMHG | BODY MASS INDEX: 27.85 KG/M2 | HEIGHT: 75 IN

## 2023-02-08 DIAGNOSIS — G47.33 OSA (OBSTRUCTIVE SLEEP APNEA): Chronic | ICD-10-CM

## 2023-02-08 DIAGNOSIS — E78.2 COMBINED HYPERLIPIDEMIA ASSOCIATED WITH TYPE 2 DIABETES MELLITUS: Chronic | ICD-10-CM

## 2023-02-08 DIAGNOSIS — G47.52 CONTROLLED REM SLEEP BEHAVIOR DISORDER: ICD-10-CM

## 2023-02-08 DIAGNOSIS — R94.2 DIFFUSION CAPACITY OF LUNG (DL), DECREASED: Primary | ICD-10-CM

## 2023-02-08 DIAGNOSIS — E11.51 TYPE 2 DIABETES MELLITUS WITH DIABETIC PERIPHERAL ANGIOPATHY WITHOUT GANGRENE, WITHOUT LONG-TERM CURRENT USE OF INSULIN: ICD-10-CM

## 2023-02-08 DIAGNOSIS — E11.69 COMBINED HYPERLIPIDEMIA ASSOCIATED WITH TYPE 2 DIABETES MELLITUS: Chronic | ICD-10-CM

## 2023-02-08 DIAGNOSIS — R94.2 RESTRICTIVE VENTILATORY DEFECT: ICD-10-CM

## 2023-02-08 PROCEDURE — 99999 PR PBB SHADOW E&M-EST. PATIENT-LVL III: ICD-10-PCS | Mod: PBBFAC,HCNC,, | Performed by: INTERNAL MEDICINE

## 2023-02-08 PROCEDURE — 99213 OFFICE O/P EST LOW 20 MIN: CPT | Mod: HCNC,S$GLB,, | Performed by: INTERNAL MEDICINE

## 2023-02-08 PROCEDURE — 99999 PR PBB SHADOW E&M-EST. PATIENT-LVL III: CPT | Mod: PBBFAC,HCNC,, | Performed by: INTERNAL MEDICINE

## 2023-02-08 PROCEDURE — 1159F MED LIST DOCD IN RCRD: CPT | Mod: HCNC,CPTII,S$GLB, | Performed by: INTERNAL MEDICINE

## 2023-02-08 PROCEDURE — 3079F PR MOST RECENT DIASTOLIC BLOOD PRESSURE 80-89 MM HG: ICD-10-PCS | Mod: HCNC,CPTII,S$GLB, | Performed by: INTERNAL MEDICINE

## 2023-02-08 PROCEDURE — 1160F RVW MEDS BY RX/DR IN RCRD: CPT | Mod: HCNC,CPTII,S$GLB, | Performed by: INTERNAL MEDICINE

## 2023-02-08 PROCEDURE — 3288F FALL RISK ASSESSMENT DOCD: CPT | Mod: HCNC,CPTII,S$GLB, | Performed by: INTERNAL MEDICINE

## 2023-02-08 PROCEDURE — 3074F PR MOST RECENT SYSTOLIC BLOOD PRESSURE < 130 MM HG: ICD-10-PCS | Mod: HCNC,CPTII,S$GLB, | Performed by: INTERNAL MEDICINE

## 2023-02-08 PROCEDURE — 1101F PR PT FALLS ASSESS DOC 0-1 FALLS W/OUT INJ PAST YR: ICD-10-PCS | Mod: HCNC,CPTII,S$GLB, | Performed by: INTERNAL MEDICINE

## 2023-02-08 PROCEDURE — 1101F PT FALLS ASSESS-DOCD LE1/YR: CPT | Mod: HCNC,CPTII,S$GLB, | Performed by: INTERNAL MEDICINE

## 2023-02-08 PROCEDURE — 3079F DIAST BP 80-89 MM HG: CPT | Mod: HCNC,CPTII,S$GLB, | Performed by: INTERNAL MEDICINE

## 2023-02-08 PROCEDURE — 3074F SYST BP LT 130 MM HG: CPT | Mod: HCNC,CPTII,S$GLB, | Performed by: INTERNAL MEDICINE

## 2023-02-08 PROCEDURE — 1159F PR MEDICATION LIST DOCUMENTED IN MEDICAL RECORD: ICD-10-PCS | Mod: HCNC,CPTII,S$GLB, | Performed by: INTERNAL MEDICINE

## 2023-02-08 PROCEDURE — 1160F PR REVIEW ALL MEDS BY PRESCRIBER/CLIN PHARMACIST DOCUMENTED: ICD-10-PCS | Mod: HCNC,CPTII,S$GLB, | Performed by: INTERNAL MEDICINE

## 2023-02-08 PROCEDURE — 3288F PR FALLS RISK ASSESSMENT DOCUMENTED: ICD-10-PCS | Mod: HCNC,CPTII,S$GLB, | Performed by: INTERNAL MEDICINE

## 2023-02-08 PROCEDURE — 99213 PR OFFICE/OUTPT VISIT, EST, LEVL III, 20-29 MIN: ICD-10-PCS | Mod: HCNC,S$GLB,, | Performed by: INTERNAL MEDICINE

## 2023-02-09 DIAGNOSIS — Z00.00 ENCOUNTER FOR MEDICARE ANNUAL WELLNESS EXAM: ICD-10-CM

## 2023-02-09 NOTE — ASSESSMENT & PLAN NOTE
Blood pressure is under good control.  We will continue the current regimen.  Will work on regular aerobic exercise and a low salt diet.       PROGRESS  note      History    Aramis Rosa is a 62 year old male who presents with a diagnosis of Non-Hodgkin's lymphoma.     Patient initially had developed right shoulder pain couple of years ago.  It was intermittent and mild at the time.  This past January he fell on the ice and it became very painful.  He saw his primary care physician at the end of January in a had an x-ray which was pretty unremarkable.  An MRI was obtained on February 18th, 2020 which revealed large ill-defined masslike marrow replacement process involving the majority of the glenoid with clearly defined cortical breakthrough and extension into the adjacent soft tissue. There was a 2nd to bone marrow replacing process involving the humeral head.    There was some intramuscular edema as well. CT of the chest abdomen and pelvis performed on February 20th revealed a lytic lesion in the right glenoid, diffuse hazy stranding throughout the small bowel mesentery with mild mass effect and associated mildly enlarged mesenteric lymph nodes, and mildly enlarged left para-aortic retroperitoneal lymph node.    Subsequent imaging with bone scan performed on February 20th revealed increased uptake in the right glenoid and a minor focus in the posterior right 8th rib.  Scan on 03/12 reveals intense activity in the right glenoid, small focus of increased activity of the left clavicle and right aspect of T4, N2 left supraclavicular lymph nodes and left goyo aortic lymph nodes.  There was also a mesenteric lymph node in the mid abdomen.  The patient had a biopsy of the right shoulder on February 27th revealed a diffuse large B-cell lymphoma, germinal center type, and FISH studies were negative for C-MYC rearrangement.     He was subsequently seen for consult by Dr. RYAN Cross MD on 03/12/20.     Patient initiated treatment with R-CHOP regimen on 03/18/20 and Neulasta support. He did experience a mild-HSR with Rituxan with symptoms of flushing, tinnitus, and  mouth daily as needed for Erectile Dysfunction. 30 tablet 3   • acetaminophen (TYLENOL) 325 MG tablet Take 2 tablets by mouth every 4 hours as needed for Pain or Fever. 60 tablet 0   • cholecalciferol (VITAMIN D) 25 mcg (1,000 units) tablet Take 1,000 Units by mouth daily.      • Multiple Vitamins-Minerals (VITAMIN - THERAPEUTIC MULTIVITAMINS W/MINERALS) tablet Take 1 tablet by mouth daily.     • ascorbic acid (VITAMIN C) 250 MG tablet Take 250 mg by mouth daily.     • aspirin 81 MG tablet Take 1 tablet by mouth daily. 30 tablet 0     No current facility-administered medications for this visit.           Review of systems    Comprehensive review of system completed as per nursing assessment and verified by me.  GENERAL:  Patient denies headache, fevers, chills, night sweats, excessive fatigue, change in appetite, weight loss, dizziness  ALLERGIC/IMMUNOLOGIC: Verified allergies: Yes  EYES:  Patient denies significant visual difficulties, double vision, blurred vision  ENT/MOUTH: Patient denies problems with hearing, sore throat, sinus drainage, mouth sores  ENDOCRINE:  Patient denies diabetes, thyroid disease, hormone replacement, hot flashes  HEMATOLOGIC/LYMPHATIC: Patient denies easy bruising, bleeding, tender lymph nodes, swollen lymph nodes  BREASTS: Patient denies not reviewed at this time  RESPIRATORY:  Patient denies lung pain with breathing, cough, coughing up blood, shortness of breath  CARDIOVASCULAR:  Patient denies anginal chest pain, palpitations, shortness of breath when lying flat, peripheral edema  GASTROINTESTINAL: Patient denies abdominal pain , nausea, vomiting, diarrhea, GI bleeding, constipation, change in bowel habits, heartburn, sensation of feeling full, difficulty swallowing  : Patient denies blood in the urine, burning with urination, frequency, urgency, hesitancy, incontinence  MUSCULOSKELETAL:  Patient denies joint pain, bone pain, joint swelling, redness, decreased range of  motion  SKIN:  Patient denies chronic rashes, inflammation, ulcerations, skin changes, itching  NEUROLOGIC:  Patient denies loss of balance, areas of focal weakness, abnormal gait, sensory problems, numbness, tingling  PSYCHIATRIC: Patient denies anxiety and depression and insomnia     This patient reported abnormal symptoms that needed immediate verbal communication: Yes, these symptoms included see above and were reported to provider.      Physical Exam    Vital Signs:    Vitals:    02/09/23 1234   BP: (!) 160/74   Pulse: 70   Resp: 16   Temp: 98.8 °F (37.1 °C)   TempSrc: Oral   SpO2: 97%   Height: 5' 11\" (1.803 m)   PainSc:  0   Body mass index is 28.73 kg/m².    General:  Well groomed, alert and oriented, well appearing, not in distress  HEENT: PERRLA, EOMI.   Neck: Supple, no mass   CV: Normal rate and regular rhythm. No Murmur, rub or gallop.    Chest: No chest wall tenderness or skin changes. Clear bilaterally without wheezes or crackles.  Abd: Not distended, Soft, nontender, no organomegaly or mass. Positive bowel sounds.  Ext: No edema. No tenderness or deformities.   Lymph nodes:  No cervical, supraclavicular, axillary or inguinal adenopathy.  Skin: normal color, no rashes or bruises or lesions.  CNS: cranial nerves 2 to 12 grossly intact, no focal neurologic deficit  Psych: affect appropriate; speech normal; cooperative    Labs:  Recent Results (from the past 24 hour(s))   COMPREHENSIVE METABOLIC PANEL    Collection Time: 02/09/23 11:56 AM   Result Value Ref Range    Fasting Status      Sodium 137 135 - 145 mmol/L    Potassium 4.5 3.4 - 5.1 mmol/L    Chloride 104 97 - 110 mmol/L    Carbon Dioxide 28 21 - 32 mmol/L    Anion Gap 10 7 - 19 mmol/L    Glucose 102 (H) 70 - 99 mg/dL    BUN 11 6 - 20 mg/dL    Creatinine 0.84 0.67 - 1.17 mg/dL    Glomerular Filtration Rate >90 >=60    BUN/ Creatinine Ratio 13 7 - 25    Calcium 8.6 8.4 - 10.2 mg/dL    Bilirubin, Total 0.7 0.2 - 1.0 mg/dL    GOT/AST 27 <=37 Units/L     Anion Gap 10 9 8*   Globulin 3.0 2.9  --    LD, Total 172 182  --      Beta 2 Microglobulin (mg/L)   Date Value   07/27/2022 1.6   10/26/2021 1.8   07/27/2021 1.5       IMAGING:  No new imaging performed for today's visit.       ASSESSMENT: Aramis Rosa is a 62 year old male with an underlying diagnosis of:   1. Diffuse large B-cell lymphoma, clinical stage KOURTNEY, germinal center type. FISH negative for C-MYC rearrangement. No bone marrow involvement on staging BM Bx. Imaging suggestive of FDG avid lytic lesion involving the right glenoid (biopsy-proven lymphomatous involvement), medial aspect of left clavicle, and right aspect of T4 vertebral body. Hypermetabolic lymphadenopathy including 2 left supraclavicular lymph nodes, left periaortic lymph node, mesenteric lymph node with low-grade FDG activity. Beta 2 microglobulin level were 1.7 at baseline on 03/18/2020. Patient initiated treatment with R-CHOP regimen on 03/18/20 and Neulasta support. He did experience a mild-HSR with Rituxan with symptoms of flushing, tinnitus, and diaphoresis. Symptoms resolved after Benadryl 25 mg IV given, the remainder of the infusion was completed at 50 mL/hr. At this point he is s/p 6 cycles of R-CHOP treatment with interval PET scan after 3 cycles showing CR. He has tolerated treatment very well with no side-effects. Repeat CT imaging on 4/27/2021, 7/27/2021, 10/26/2021, 7/26/2022 with no evidence of relapse, stable \"valentín mesentery\".   Clinically he has been doing well without any  concerning sign or symptoms.   All labs from today were reviewed during the visit. Electrolytes, kidney function, and organ function are stable. Blood counts are unremarkable. LDH is also normal.   There is no concern for recurrent disease.   Reviewed with him that as he has been out from treatment for a while and as there is no role of routine surveillance imaging for diffuse large b cell lymphoma and as his abnormal findings in the mesentery has improved, I would recommend to do imaging only as clinically indicated. Will continue with surveillance with physical examination, history and labs. He is agreeable with the plan.   Follow up in about 6 months for MD visit with labs.     2. T3 compression deformity. Noted on CT CAP 2/4/2021. Secondary to bony involvement of disease. Stable on 4/27/2021 study. Will continue to monitor.   3. Right shoulder pain, secondary to biopsy-proven glenoid lymphomatous involvement and lytic lesion on imaging. Resolved after first cycle of systemic treatment.        Rationale for proposed plan of care discussed. Patient stated understanding and agreement. Questions were answered. Follow-up was arranged.     FOLLOW UP:   Return for visit with me in 6 months with CBC, Cmp and LDH prior        There are no Patient Instructions on file for this visit.     Rickey Gonzalez MD   2/9/2023     This chart was documented by Moises Goldberg, acting as a scribe for Rickey Gonzalez MD. 2/9/2023, 1:04 PM.      The documentation recorded by the scribe accurately and completely reflects the service(s) I personally performed and the decisions made by me.   Rickey Gonzalez MD

## 2023-02-13 ENCOUNTER — OFFICE VISIT (OUTPATIENT)
Dept: PRIMARY CARE CLINIC | Facility: CLINIC | Age: 83
End: 2023-02-13
Payer: MEDICARE

## 2023-02-13 VITALS
HEART RATE: 71 BPM | SYSTOLIC BLOOD PRESSURE: 115 MMHG | WEIGHT: 223.19 LBS | HEIGHT: 75 IN | BODY MASS INDEX: 27.75 KG/M2 | OXYGEN SATURATION: 98 % | TEMPERATURE: 98 F | DIASTOLIC BLOOD PRESSURE: 56 MMHG

## 2023-02-13 DIAGNOSIS — I15.2 HYPERTENSION ASSOCIATED WITH DIABETES: ICD-10-CM

## 2023-02-13 DIAGNOSIS — H10.31 ACUTE CONJUNCTIVITIS OF RIGHT EYE, UNSPECIFIED ACUTE CONJUNCTIVITIS TYPE: Primary | ICD-10-CM

## 2023-02-13 DIAGNOSIS — M47.816 LUMBAR SPONDYLOSIS: ICD-10-CM

## 2023-02-13 DIAGNOSIS — E11.59 HYPERTENSION ASSOCIATED WITH DIABETES: ICD-10-CM

## 2023-02-13 DIAGNOSIS — E11.42 TYPE 2 DIABETES MELLITUS WITH DIABETIC POLYNEUROPATHY, WITHOUT LONG-TERM CURRENT USE OF INSULIN: ICD-10-CM

## 2023-02-13 DIAGNOSIS — N18.31 STAGE 3A CHRONIC KIDNEY DISEASE: ICD-10-CM

## 2023-02-13 DIAGNOSIS — Z74.09 IMPAIRED FUNCTIONAL MOBILITY, BALANCE, AND ENDURANCE: ICD-10-CM

## 2023-02-13 DIAGNOSIS — E66.3 OVERWEIGHT WITH BODY MASS INDEX (BMI) OF 27 TO 27.9 IN ADULT: ICD-10-CM

## 2023-02-13 PROCEDURE — 3078F DIAST BP <80 MM HG: CPT | Mod: HCNC,CPTII,S$GLB, | Performed by: NURSE PRACTITIONER

## 2023-02-13 PROCEDURE — 3078F PR MOST RECENT DIASTOLIC BLOOD PRESSURE < 80 MM HG: ICD-10-PCS | Mod: HCNC,CPTII,S$GLB, | Performed by: NURSE PRACTITIONER

## 2023-02-13 PROCEDURE — 1159F MED LIST DOCD IN RCRD: CPT | Mod: HCNC,CPTII,S$GLB, | Performed by: NURSE PRACTITIONER

## 2023-02-13 PROCEDURE — 1101F PR PT FALLS ASSESS DOC 0-1 FALLS W/OUT INJ PAST YR: ICD-10-PCS | Mod: HCNC,CPTII,S$GLB, | Performed by: NURSE PRACTITIONER

## 2023-02-13 PROCEDURE — 99999 PR PBB SHADOW E&M-EST. PATIENT-LVL V: CPT | Mod: PBBFAC,HCNC,, | Performed by: NURSE PRACTITIONER

## 2023-02-13 PROCEDURE — 3288F FALL RISK ASSESSMENT DOCD: CPT | Mod: HCNC,CPTII,S$GLB, | Performed by: NURSE PRACTITIONER

## 2023-02-13 PROCEDURE — 1125F AMNT PAIN NOTED PAIN PRSNT: CPT | Mod: HCNC,CPTII,S$GLB, | Performed by: NURSE PRACTITIONER

## 2023-02-13 PROCEDURE — 1125F PR PAIN SEVERITY QUANTIFIED, PAIN PRESENT: ICD-10-PCS | Mod: HCNC,CPTII,S$GLB, | Performed by: NURSE PRACTITIONER

## 2023-02-13 PROCEDURE — 3288F PR FALLS RISK ASSESSMENT DOCUMENTED: ICD-10-PCS | Mod: HCNC,CPTII,S$GLB, | Performed by: NURSE PRACTITIONER

## 2023-02-13 PROCEDURE — 99999 PR PBB SHADOW E&M-EST. PATIENT-LVL V: ICD-10-PCS | Mod: PBBFAC,HCNC,, | Performed by: NURSE PRACTITIONER

## 2023-02-13 PROCEDURE — 1101F PT FALLS ASSESS-DOCD LE1/YR: CPT | Mod: HCNC,CPTII,S$GLB, | Performed by: NURSE PRACTITIONER

## 2023-02-13 PROCEDURE — 99214 OFFICE O/P EST MOD 30 MIN: CPT | Mod: HCNC,S$GLB,, | Performed by: NURSE PRACTITIONER

## 2023-02-13 PROCEDURE — 1159F PR MEDICATION LIST DOCUMENTED IN MEDICAL RECORD: ICD-10-PCS | Mod: HCNC,CPTII,S$GLB, | Performed by: NURSE PRACTITIONER

## 2023-02-13 PROCEDURE — 3074F PR MOST RECENT SYSTOLIC BLOOD PRESSURE < 130 MM HG: ICD-10-PCS | Mod: HCNC,CPTII,S$GLB, | Performed by: NURSE PRACTITIONER

## 2023-02-13 PROCEDURE — 99214 PR OFFICE/OUTPT VISIT, EST, LEVL IV, 30-39 MIN: ICD-10-PCS | Mod: HCNC,S$GLB,, | Performed by: NURSE PRACTITIONER

## 2023-02-13 PROCEDURE — 3074F SYST BP LT 130 MM HG: CPT | Mod: HCNC,CPTII,S$GLB, | Performed by: NURSE PRACTITIONER

## 2023-02-13 RX ORDER — CETIRIZINE HYDROCHLORIDE 10 MG/1
10 TABLET ORAL NIGHTLY
Qty: 14 TABLET | Refills: 0 | Status: SHIPPED | OUTPATIENT
Start: 2023-02-13 | End: 2024-02-29

## 2023-02-13 RX ORDER — OLOPATADINE HYDROCHLORIDE 1 MG/ML
1 SOLUTION/ DROPS OPHTHALMIC 2 TIMES DAILY
Qty: 5 ML | Refills: 0 | Status: SHIPPED | OUTPATIENT
Start: 2023-02-13 | End: 2023-12-18

## 2023-02-13 NOTE — PROGRESS NOTES
Subjective:       Patient ID: Srinath Mcknight is a 82 y.o. male.    Chief Complaint: Eye Problem    Mr. Srinath Mcknight is a 82 year old male, new to me, presents to the clinic today with eye irritation and increased tear production. PCP is Yeison Wilder. Medical and surgical history in addition to problem list reviewed as listed below.    Eye Problem   The right eye is affected. This is a new problem. The current episode started in the past 7 days. The problem occurs constantly. There was no injury mechanism. The pain is at a severity of 6/10. The pain is moderate. There is No known exposure to pink eye. He Does not wear contacts. Associated symptoms include an eye discharge, eye redness and itching. Pertinent negatives include no blurred vision, fever, nausea or vomiting. He has tried eye drops for the symptoms. The treatment provided mild relief.     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    Trouble in sleeping     Type 2 diabetes mellitus with diabetic polyneuropathy, without long-term current use of insulin     Urinary incontinence     Uses hearing aid     bilat        Past Surgical History:   Procedure Laterality Date    BACK SURGERY  2019    COLONOSCOPY N/A 6/30/2020    Procedure: COLONOSCOPY;  Surgeon: Joseph Iraheta MD;  Location: [facility];  Service: Endoscopy;  Laterality: N/A;    ESOPHAGOGASTRODUODENOSCOPY N/A  6/30/2020    Procedure: EGD (ESOPHAGOGASTRODUODENOSCOPY);  Surgeon: Joseph Iraheta MD;  Location: [facility];  Service: Endoscopy;  Laterality: N/A;    HERNIA REPAIR Bilateral 04/18/2017    JOINT REPLACEMENT Left 10/09/2017    L YAHIR Dr. Alcocer    LAMINECTOMY  07/22/2016    left elbow  10/2017    procedure to remove gout    lumbar foraminotomy  03/2018    REVISION TOTAL HIP ARTHROPLASTY Left 9/11/2018    Procedure: REVISION, TOTAL ARTHROPLASTY, HIP;  Surgeon: Albaro Alcocer Sr., MD;  [facility];  Service: Orthopedics;  Laterality: Left;    ROTATOR CUFF REPAIR Left 2006    SHOULDER ARTHROSCOPY W/ ROTATOR CUFF REPAIR Right 2009        Family History   Problem Relation Age of Onset    Hypertension Mother     Heart disease Mother     Diabetes Mother     Hypertension Father     Heart disease Father     Diabetes Father     Stroke Father     Diabetes Sister     Cancer Brother 50        pancreas    Drug abuse Brother     Prostate cancer Neg Hx     Macular degeneration Neg Hx     Retinal detachment Neg Hx     Strabismus Neg Hx     Glaucoma Neg Hx     Blindness Neg Hx     Amblyopia Neg Hx     Kidney disease Neg Hx     Mental illness Neg Hx     Mental retardation Neg Hx     COPD Neg Hx     Asthma Neg Hx        Social History     Tobacco Use   Smoking Status Never   Smokeless Tobacco Never       Social History     Social History Narrative    . No children. Retired but some part time work - 4 hours a day. 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 Caffeine intake - sugar free cola, Rare coffee intake. Still drives. Does have a Living Will . Has a nephew Jt Gayle, lives in [place]. Has a friend Karina Rossi, locally who could help him.        Review of patient's allergies indicates:   Allergen Reactions    Sulfa (sulfonamide antibiotics)      Can't recall from 1995        Review of Systems   Constitutional:  Negative for fever.   Eyes:  Positive for discharge, redness and itching. Negative for blurred vision.    Respiratory:  Negative for shortness of breath.    Cardiovascular:  Negative for chest pain.   Gastrointestinal:  Negative for nausea and vomiting.   Musculoskeletal:  Positive for arthralgias, back pain, gait problem and myalgias.       Objective:        Vitals:    02/13/23 1452   BP: (!) 115/56   Pulse: 71   Temp: 97.6 °F (36.4 °C)        Physical Exam  Constitutional:       Appearance: Normal appearance.   HENT:      Right Ear: External ear normal.      Left Ear: External ear normal.      Nose: No congestion or rhinorrhea.      Mouth/Throat:      Mouth: Mucous membranes are moist.      Pharynx: Oropharynx is clear.   Eyes:      General:         Right eye: Discharge present.   Pulmonary:      Effort: Pulmonary effort is normal. No respiratory distress.   Abdominal:      General: Bowel sounds are normal. There is no distension.      Tenderness: There is no abdominal tenderness. There is no guarding.   Musculoskeletal:         General: Tenderness and deformity present.   Skin:     General: Skin is warm and dry.      Capillary Refill: Capillary refill takes 2 to 3 seconds.   Neurological:      Mental Status: He is alert and oriented to person, place, and time.       Assessment:       1. Acute conjunctivitis of right eye, unspecified acute conjunctivitis type    2. Overweight with body mass index (BMI) of 27 to 27.9 in adult    3. Hypertension associated with diabetes    4. Impaired functional mobility, balance, and endurance    5. Stage 3a chronic kidney disease    6. Lumbar spondylosis    7. Type 2 diabetes mellitus with diabetic polyneuropathy, without long-term current use of insulin        Plan:       Acute conjunctivitis of right eye, unspecified acute conjunctivitis type  Patient states he is scheduled to follow-up with ophthalmologist in [place] next month.  -     cetirizine (ZYRTEC) 10 MG tablet; Take 1 tablet (10 mg total) by mouth every evening. for 14 days  Dispense: 14 tablet; Refill: 0  -      olopatadine (PATANOL) 0.1 % ophthalmic solution; Place 1 drop into the right eye 2 (two) times daily.  Dispense: 5 mL; Refill: 0    Overweight with body mass index (BMI) of 27 to 27.9 in adult  Recommend Mediterranean diet, chair exercises 10-15 minutes intervals, 3 times a week, increase as tolerated.      Hypertension associated with diabetes  Controlled with losartan and metoprolol.  Monitor blood pressure twice a day, one hour after medication and before bedtime    Impaired functional mobility, balance, and endurance  Stable with walking cane as assistive device.    Stage 3a chronic kidney disease  12/06/2022 GFR-51 BUN-17, Creat-1.4  Kidney disease appears stable. Previous referral placed, appointment missed secondary to COVID concerns, encouraged to schedule appointment and follow-up with Nephrology for evaluation and management.    Type 2 diabetes mellitus with diabetic polyneuropathy, without long-term current use of insulin  Controlled with metformin, Jardiance, American diabetic Association diet. Monitor and record blood glucose daily.    Chart review.    Health maintenance reviewed.    Follow-up with PCP within the next 6 months.    Medication List with Changes/Refills   New Medications    OLOPATADINE (PATANOL) 0.1 % OPHTHALMIC SOLUTION    Place 1 drop into the right eye 2 (two) times daily.   Current Medications    ALBUTEROL (VENTOLIN HFA) 90 MCG/ACTUATION INHALER    Inhale 2 puffs into the lungs every 6 (six) hours as needed for Wheezing or Shortness of Breath. Rescue    ALLOPURINOL (ZYLOPRIM) 100 MG TABLET    TAKE 1 TABLET EVERY DAY    BISACODYL (DULCOLAX) 10 MG SUPP        BLOOD GLUCOSE CONTROL, LOW SOLN    by Misc.(Non-Drug; Combo Route) route.    BLOOD SUGAR DIAGNOSTIC (TRUE METRIX GLUCOSE TEST STRIP) STRP    Use as directed to check sugars    BLOOD-GLUCOSE METER (TRUE METRIX AIR GLUCOSE METER) KIT    USE AS DIRECTED    CLONAZEPAM (KLONOPIN) 0.5 MG TABLET    TAKE 1 TABLET EVERY EVENING     "CYANOCOBALAMIN, VITAMIN B-12, 1,000 MCG SUBL    Place 1,000 mcg under the tongue once daily.    DOXEPIN (SINEQUAN) 10 MG CAPSULE    Take 1 capsule (10 mg total) by mouth every evening.    GABAPENTIN (NEURONTIN) 600 MG TABLET    TAKE 1 TABLET EVERY DAY    GARLIC EXTRACT ORAL    Take 1 tablet by mouth once daily. Per [facility]    JARDIANCE 10 MG TABLET        LANCETS MISC    Use as directed to check sugars    LEXISCAN 0.4 MG/5 ML SYRG        LOSARTAN (COZAAR) 50 MG TABLET    Take 1 tablet (50 mg total) by mouth once daily.    METFORMIN (GLUCOPHAGE) 1000 MG TABLET    TAKE 1 TABLET TWICE DAILY WITH MEALS    METOPROLOL SUCCINATE (TOPROL-XL) 50 MG 24 HR TABLET    TAKE 1 TABLET EVERY DAY    MOMETASONE 0.1% (ELOCON) 0.1 % CREAM    Apply topically once daily.    MOXIFLOXACIN (VIGAMOX) 0.5 % OPHTHALMIC SOLUTION        MULTIVITAMIN (THERAGRAN) PER TABLET    Take 1 tablet by mouth once daily.    OXYCODONE-ACETAMINOPHEN (PERCOCET) 5-325 MG PER TABLET    Take 1 tablet by mouth every 8 (eight) hours as needed.    PAPAVERINE 30 MG/ML INJECTION    Inject 0.3 ml of trimix solution prn ED max once daily  Prepare 10ml of trimix solution containing:    Papaverine 30mg/ml    Phentolamine 1 mg/ml    Alprostadil 10mcg/ml  Dispense 10ml per refill  Qs syringes 1cc/30g/0.5" and alcohol swabs dispense as needed for Intracavernosal injection    PRAVASTATIN (PRAVACHOL) 40 MG TABLET    TAKE 1 TABLET EVERY DAY    PREDNISOLONE ACETATE (PRED FORTE) 1 % DRPS        PREDNISONE (DELTASONE) 5 MG TABLET    Take 3 tablets for 3 days, then 2 tablets for 3 days, then 1 tablet for 3 days.    SILDENAFIL (VIAGRA) 100 MG TABLET    Take 1 tablet (100 mg total) by mouth daily as needed for Erectile Dysfunction.    TAMSULOSIN (FLOMAX) 0.4 MG CAP    Take 1 capsule (0.4 mg total) by mouth once daily.   Changed and/or Refilled Medications    Modified Medication Previous Medication    CETIRIZINE (ZYRTEC) 10 MG TABLET cetirizine (ZYRTEC) 10 MG tablet       " Take 1 tablet (10 mg total) by mouth every evening. for 14 days    TAKE 1 TABLET BY MOUTH EVERY DAY FOR 14 DAYS            Follow up if symptoms worsen or fail to improve.    ALDO Iverson, MSN, FNP-C

## 2023-02-13 NOTE — PATIENT INSTRUCTIONS
Please schedule to see ophthalmology    Patient instructions:   Wash hands often. Always wash them before and after you treat pink eye or touch your eyes or face.  Use moist cotton or a clean, wet cloth to remove crust. Wipe from the inside corner of the eye to the outside. Use a clean part of the cloth for each wipe.  Put cold or warm wet cloths on eye a few times a day if the eye hurts.  Do not wear contact lenses or eye makeup until the pink eye is gone. Throw away any eye makeup you were using when you got pink eye. Clean your contacts and storage case. If you wear disposable contacts, use a new pair when eye has cleared and it is safe to wear contacts again.  Use the medicine for as long as instructed, even if eye starts looking better soon. Keep the bottle tip clean, and do not let it touch the eye area.  To put in eyedrops or ointment:  Tilt your head back, and pull lower eyelid down with one finger.  Drop or squirt the medicine inside the lower lid.  Close eye for 30 to 60 seconds to let the drops or ointment move around.  Do not touch the ointment or dropper tip to eyelashes or any other surface.

## 2023-02-14 NOTE — PROGRESS NOTES
New Patient Chronic Pain Note (Initial Visit)    Referring Physician: Yeison Wilder MD    PCP: Yeison Wilder MD    Chief Complaint:   Chief Complaint   Patient presents with    Low-back Pain    Knee Pain     RIGHT         SUBJECTIVE:    Srinath Mcknight is a 82 y.o. male with past medical history significant for hypertension, hyperlipidemia, type 2 diabetes, coronary artery disease, urinary incontinence, sickle cell trait, stage 3 chronic kidney disease, gout, obstructive sleep apnea, history of L3-4 lumbar fusion in L4-5 lumbar laminectomy who presents to the clinic for the evaluation of lower back pain.  Of note patient reports 3 prior back surgeries:  The initial several years prior in the left lower back at [facility], and 2 lower back surgeries with Dr. Iqbal within the last 2 years.  Today patient reports pain which is constant which is rated an 8/5.  Pain is described as aching in nature.  Patient reports pain in a bandlike distribution in the lower back without radiation into the buttocks or lower extremities.  Patient does report chronic right-sided knee pain and history of total knee arthroplasty on the left.  Pain is exacerbated 1st thing in the morning when arising, with lumbar extension as well as with ambulation.  Patient reports he is able to ambulate only a few steps before requiring rest.  Pain is improved with lumbar support as well as sitting.  Patient also reports receiving a lumbar epidural steroid injection at the back and spine [place] without any meaningful relief.  Patient has completed conventional physical therapy last year and continues physician directed physical therapy exercises at home without meaningful relief.  Patient has been taking gabapentin 600 mg once daily without improvement in his pain.  Patient does endorse weakness in the lower extremities associated with his pain.    Patient reports significant motor weakness.  Patient denies night fever/night  sweats, urinary incontinence, bowel incontinence, significant weight loss, and loss of sensations.      Pain Disability Index Review:     Last 3 PDI Scores 2/16/2023 3/20/2018 2/8/2018   Pain Disability Index (PDI) 30 49 68       Non-Pharmacologic Treatments:  Physical Therapy/Home Exercise: yes  Ice/Heat:no  TENS: no  Acupuncture: no  Massage: no  Chiropractic: no    Other: yes; sx x 3: [facility], Dr. Iqbal x 2      Pain Medications:  - Opioids: Percocet (Oxycodone/Acetaminophen)  - Adjuvant Medications: Clonazepam (Klonopin), Neurontin (Gabapentin), and Prednisone (Deltasone)    Pain Procedures:   [facility]    -02/15/2018: Left-sided sacroiliac joint injection; Dr. Roldan  -01/24/2018: Left-sided L4/5 and L5/S1 transforaminal epidural steroid injection; Dr. Roldan  -06/14/2016: Left L3-4 facet joint injection; Dr. Hutchinson  -05/18/2016: Left hip intra-articular injection posterior approach; Dr. 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    Trouble in sleeping     Type 2 diabetes mellitus with diabetic polyneuropathy, without long-term current use of insulin     Urinary incontinence     Uses hearing aid     bilat     Past Surgical History:   Procedure Laterality Date    BACK SURGERY  2019    COLONOSCOPY N/A 6/30/2020    Procedure: COLONOSCOPY;  Surgeon: Joseph Iraheta MD;  Location: [facility];  Service: Endoscopy;   Laterality: N/A;    ESOPHAGOGASTRODUODENOSCOPY N/A 6/30/2020    Procedure: EGD (ESOPHAGOGASTRODUODENOSCOPY);  Surgeon: Joseph Iraheta MD;  [facility];  Service: Endoscopy;  Laterality: N/A;    HERNIA REPAIR Bilateral 04/18/2017    JOINT REPLACEMENT Left 10/09/2017    L YAHIR Dr. Alcocer    LAMINECTOMY  07/22/2016    left elbow  10/2017    procedure to remove gout    lumbar foraminotomy  03/2018    REVISION TOTAL HIP ARTHROPLASTY Left 9/11/2018    Procedure: REVISION, TOTAL ARTHROPLASTY, HIP;  Surgeon: Albaro Alcocer Sr., MD;  Location: [facility];  Service: Orthopedics;  Laterality: Left;    ROTATOR CUFF REPAIR Left 2006    SHOULDER ARTHROSCOPY W/ ROTATOR CUFF REPAIR Right 2009     Review of patient's allergies indicates:   Allergen Reactions    Sulfa (sulfonamide antibiotics)      Can't recall from 1995       Current Outpatient Medications   Medication Sig    albuterol (VENTOLIN HFA) 90 mcg/actuation inhaler Inhale 2 puffs into the lungs every 6 (six) hours as needed for Wheezing or Shortness of Breath. Rescue    allopurinoL (ZYLOPRIM) 100 MG tablet TAKE 1 TABLET EVERY DAY    bisacodyl (DULCOLAX) 10 mg Supp     blood glucose control, low Soln by Misc.(Non-Drug; Combo Route) route.    blood sugar diagnostic (TRUE METRIX GLUCOSE TEST STRIP) Strp Use as directed to check sugars    blood-glucose meter (TRUE METRIX AIR GLUCOSE METER) kit USE AS DIRECTED    cetirizine (ZYRTEC) 10 MG tablet Take 1 tablet (10 mg total) by mouth every evening. for 14 days    clonazePAM (KLONOPIN) 0.5 MG tablet TAKE 1 TABLET EVERY EVENING    cyanocobalamin, vitamin B-12, 1,000 mcg Subl Place 1,000 mcg under the tongue once daily.    doxepin (SINEQUAN) 10 MG capsule Take 1 capsule (10 mg total) by mouth every evening.    GARLIC EXTRACT ORAL Take 1 tablet by mouth once daily. Per [facility]    JARDIANCE 10 mg tablet     lancets Misc Use as directed to check sugars    LEXISCAN 0.4 mg/5 mL Syrg     losartan (COZAAR) 50 MG  "tablet Take 1 tablet (50 mg total) by mouth once daily.    metFORMIN (GLUCOPHAGE) 1000 MG tablet TAKE 1 TABLET TWICE DAILY WITH MEALS    metoprolol succinate (TOPROL-XL) 50 MG 24 hr tablet TAKE 1 TABLET EVERY DAY    mometasone 0.1% (ELOCON) 0.1 % cream Apply topically once daily.    moxifloxacin (VIGAMOX) 0.5 % ophthalmic solution     multivitamin (THERAGRAN) per tablet Take 1 tablet by mouth once daily.    olopatadine (PATANOL) 0.1 % ophthalmic solution Place 1 drop into the right eye 2 (two) times daily.    papaverine 30 mg/mL injection Inject 0.3 ml of trimix solution prn ED max once daily  Prepare 10ml of trimix solution containing:    Papaverine 30mg/ml    Phentolamine 1 mg/ml    Alprostadil 10mcg/ml  Dispense 10ml per refill  Qs syringes 1cc/30g/0.5" and alcohol swabs dispense as needed for Intracavernosal injection    pravastatin (PRAVACHOL) 40 MG tablet TAKE 1 TABLET EVERY DAY    prednisoLONE acetate (PRED FORTE) 1 % DrpS     pregabalin (LYRICA) 75 MG capsule Take 1 capsule (75 mg total) by mouth every evening for 15 days, THEN 2 capsules (150 mg total) every evening for 15 days.     No current facility-administered medications for this visit.       Review of Systems     GENERAL:  No weight loss, malaise or fevers.  HEENT:   No recent changes in vision or hearing  NECK:  Negative for lumps, no difficulty with swallowing.  RESPIRATORY:  Negative for cough, wheezing or shortness of breath, patient denies any recent URI.  CARDIOVASCULAR:  Negative for chest pain or palpitations.  GI:  Negative for abdominal discomfort, blood in stools or black stools or change in bowel habits.  MUSCULOSKELETAL:  See HPI.  SKIN:  Negative for lesions, rash, and itching.  PSYCH:  No mood disorder or recent psychosocial stressors.   HEMATOLOGY/LYMPHOLOGY:  Negative for prolonged bleeding, bruising easily or swollen nodes.    NEURO:   No history of syncope, paralysis, seizures or tremors.  All other reviewed and negative other than " "HPI.    OBJECTIVE:    BP (!) 147/66   Pulse 71   Resp 17   Ht 6' 3" (1.905 m)   Wt 98 kg (216 lb 0.8 oz)   BMI 27.00 kg/m²       Physical Exam    GENERAL: Well appearing, in no acute distress, alert and oriented x3.  PSYCH:  Mood and affect appropriate.  SKIN: Skin color, texture, turgor normal, no rashes or lesions.  HEAD/FACE:  Normocephalic, atraumatic. Cranial nerves grossly intact.    CV: RRR with palpation of the radial artery.  PULM: No evidence of respiratory difficulty, symmetric chest rise.  GI:  Soft and non-tender.    BACK:  Positive shopping cart sign.  Well-healed 5 in lower lumbar vertical incision.  Straight leg raising in the sitting and supine positions is negative to radicular pain. No pain to palpation over the facet joints of the lumbar spine or spinous processes.  Reduced range of motion with pain reproduction.  EXTREMITIES:  Peripheral joint range of motion is reduced with pain with instability noted bilateral lower extremities. No deformities, edema, or skin discoloration. Good capillary refill.  MUSCULOSKELETAL: Able to stand on heels but not toes secondary to history of broken toes.    hip, and knee provocative maneuvers are negative.  There is no pain with palpation over the sacroiliac joints bilaterally.  Gaenslen's, Distraction/Compression and  FABERs test is negative.  Facet loading test is negative bilaterally.   Bilateral upper and lower extremity strength is normal and symmetric.  No atrophy or tone abnormalities are noted.    RIGHT Lower extremity: Hip flexion 5/5, Hip Abduction 5/5, Hip Adduction 5/5, Knee extension 5/5, Knee flexion 5/5, Ankle dorsiflexion5/5, Extensor hallucis longus 5/5, Ankle plantarflexion 5/5  LEFT Lower extremity:  Hip flexion 5/5, Hip Abduction 5/5,Hip Adduction 5/5, Knee extension 5/5, Knee flexion 5/5, Ankle dorsiflexion 5/5, Extensor hallucis longus 5/5, Ankle plantarflexion 5/5  -Normal testing knee (patellar) jerk and ankle (achilles) " jerk    NEURO: Bilateral upper and lower extremity coordination and muscle stretch reflexes are physiologic and symmetric. No loss of sensation is noted.  GAIT: normal.    Imagin/20/17  MRI Lumbar Spine W WO Contrast      FINDINGS:    Since the previous study there has been a laminectomy at L4-L5. There is enhancement of granulation tissue at the operative site.  No abnormal enhancement surrounds the exiting nerve roots.  The thecal sac measures 9 mm AP at this level.  There is slight mass effect upon the lateral recess without definite compression of the descending L5 nerve roots. There is a synovial cyst with peripheral enhancement projecting centrally and inferiorly from the left facet joint at L3-L4 that demonstrates peripheral enhancement.  It causes mild spinal stenosis at the level of L4 and is slightly larger than on the previous study from May 2016. The conus medullaris terminates at the level ofL1-L2. No abnormal signal within the conus. Intervertebral disc levels are as follows:    T12-L1 disc: No significant disc pathology. No spinal canal or neural foraminal stenosis.    L1-L2 disc : No significant disc pathology. No spinal canal or neural foraminal stenosis.    L2-L3 disc: Normal disc height with mild degenerative facet changes and thickening of ligamentum flavum.  No significant canal or foraminal stenosis.    L3-L4 disc: Partial distal desiccation with a broad-based posterior disc bulge.  There is a far left lateral annular fissure of the disc.  Moderate to severe degenerative facet change with bilateral facet joint effusions.  The thecal sac measures 10 mm AP but is about 50% of normal cross-sectional area.  Disc material bulges into the floor of the left exit foramen and causes mild to moderate left foraminal stenosis.  There is minimal right foraminal stenosis.  Additionally, there is a synovial cyst projects centrally and inferiorly from the left facet joint.  The synovial cyst  demonstrates peripheral enhancement and measures 11 mm craniocaudal by 11 mm transverse by 5 mm AP.  It is best demonstrated on axial T2 series 6 image 23.  It causes mild thecal sac stenosis without definite neural compression.  There is a smaller synovial cyst projecting toward the left as well, remote from any neural structures.    L4-L5 disc: Level of interval laminectomy.  There is enhancing granulation tissue at the operative site without enhancement surrounding the exiting nerve roots.  The thecal sac measures 9 mm AP.  There is mild mass effect upon the lateral recesses without definite compression no descending L5 nerve roots.  Minimal bilateral foraminal stenosis.    L5-S1 disc: Partial does desiccation and disc space height loss.  Height loss is most severe toward the left where there are marginal osteophytes.  Osteophyte material encroaches into the floor of the left exit foramen causing moderate to severe left foraminal stenosis.  The right neural foramen is minimally narrowed.  Moderate degenerative facet change.  The thecal sac measures 12 mm AP.  IMPRESSION:      1.  There has been an interval laminectomy at L4-L5.  There is enhancing granulation tissue at the operative site.  There is diminished spinal stenosis at this level compared to the previous exam.    2.  Enhancement surrounds a synovial cyst that projects centrally and inferiorly from the left L3-L4 facet joint and causes mild spinal stenosis.  The cyst has increased in size compared to the previous examination.    3.  There is mild to moderate spinal stenosis at L3-L4 where the thecal sac is about 50% of normal cross-sectional area, predominantly related to hypertrophy of the posterior elements.    4.  There is a far left lateral annular fissure of L3-L4 disc.    5.  Moderate to severe left-sided foraminal stenosis at L5-S1.       11/14/22    X-Ray Lumbar Spine AP And Lateral  FINDINGS:  Prior posterior fusion of L3-L4 with interbody  spacer.  Normal alignment.  No evidence to suggest hardware failure.  Remaining lumbar vertebral bodies are normal in height and alignment.    There is mild to moderate multilevel degenerative disc disease most pronounced at L5-S1.  SI joints are intact.       11/18/19    X-Ray Cervical Spine AP And Lateral  FINDINGS:  There is visualization of C7-T1 disc level on the lateral view.  Interval progression multilevel degenerative changes throughout the C-spine.  Anterior and posterior marginal spurring with concomitant varying degrees of loss of disc height throughout the C-spine.  No acute fracture or subluxation.  C1-2 articulation appears within normal limits allowing for rotation.    Impression  Interval progression multilevel cervical spondylosis without acute fracture or subluxation.    Follow-up and/or further evaluation as warranted.        ASSESSMENT: 82 y.o. year old male with lower back pain, consistent with     1. DDD (degenerative disc disease), lumbar  Ambulatory referral/consult to Physical/Occupational Therapy      2. History of lumbar laminectomy  Ambulatory referral/consult to [facility]    Ambulatory referral/consult to Physical/Occupational Therapy      3. Lumbar facet arthropathy        4. History of lumbar fusion  Ambulatory referral/consult to Physical/Occupational Therapy            PLAN:   - Interventions:  We have discussed considering a lumbar transforaminal epidural steroid injection versus caudal epidural steroid injection.  We will 1st review most recent MRI.. Explained the risks and benefits of the procedure in detail with the patient today in clinic along with alternative treatment options    - Anticoagulation use: no no anticoagulation     report:  Reviewed and consistent with medication use as prescribed.    - Medications:  --I will start the patient on a Lyrica titration to see if this helps with neuropathic pain.  We discussed increasing the dose gradually to reach a  therapeutic goal according to the following algorithm.  We discussed potential side effects of this medication which may include drowsiness,dizziness, dry mouth, constipation or peripheral edema.    -Week 1: Take 1 capsule, 75 mg q.h.s.  -Week 2+: Take 2 capsules, 150 mg q.h.s.      - Therapy:   We discussed initiating physical therapy to help manage the patient/s painful condition. The patient was counseled that muscle strengthening will improve the long term prognosis in regards to pain and may also help increase range of motion and mobility. They were told that one of the goals of physical therapy is that they learn how to do the exercises so that they can do them independently at home daily upon completion. The patient's questions were answered and they were agreeable to this course. A referral for physical therapy was provided to the patient.      - Imaging: Reviewed available imaging with patient and answered any questions they had regarding study.        - Records: Obtain old records from outside physicians and imaging: Dr. Iqbal: [facility]    - Follow up visit: return to clinic in 4-6 weeks      The above plan and management options were discussed at length with patient. Patient is in agreement with the above and verbalized understanding.    - I discussed the goals of interventional chronic pain management with the patient on today's visit. We discussed a multimodal and systematic approach to pain.  This includes diagnostic and therapeutic injections, adjuvant pharmacologic treatment, physical therapy, and at times psychiatry.  I emphasized the importance of regular exercise, core strengthening and stretching, diet and weight loss as a cornerstone of long-term pain management.    - This condition does not require this patient to take time off of work, and the primary goal of our Pain Management services is to improve the patient's functional capacity.  - Patient Questions: Answered all of the  patient's questions regarding diagnoses, therapy, treatment and next steps        Hossein Brooks MD  Interventional Pain Management  [facility]    Disclaimer:  This note was prepared using voice recognition system and is likely to have sound alike errors that may have been overlooked even after proof reading.  Please call me with any questions

## 2023-02-16 ENCOUNTER — OFFICE VISIT (OUTPATIENT)
Dept: PAIN MEDICINE | Facility: CLINIC | Age: 83
End: 2023-02-16
Payer: MEDICARE

## 2023-02-16 VITALS
WEIGHT: 216.06 LBS | RESPIRATION RATE: 17 BRPM | DIASTOLIC BLOOD PRESSURE: 66 MMHG | SYSTOLIC BLOOD PRESSURE: 147 MMHG | HEIGHT: 75 IN | BODY MASS INDEX: 26.86 KG/M2 | HEART RATE: 71 BPM

## 2023-02-16 DIAGNOSIS — Z98.890 HISTORY OF LUMBAR LAMINECTOMY: ICD-10-CM

## 2023-02-16 DIAGNOSIS — M47.816 LUMBAR FACET ARTHROPATHY: ICD-10-CM

## 2023-02-16 DIAGNOSIS — Z98.1 HISTORY OF LUMBAR FUSION: ICD-10-CM

## 2023-02-16 DIAGNOSIS — M51.36 DDD (DEGENERATIVE DISC DISEASE), LUMBAR: Primary | ICD-10-CM

## 2023-02-16 PROCEDURE — 3288F PR FALLS RISK ASSESSMENT DOCUMENTED: ICD-10-PCS | Mod: HCNC,CPTII,S$GLB, | Performed by: ANESTHESIOLOGY

## 2023-02-16 PROCEDURE — 99204 OFFICE O/P NEW MOD 45 MIN: CPT | Mod: HCNC,S$GLB,, | Performed by: ANESTHESIOLOGY

## 2023-02-16 PROCEDURE — 1125F PR PAIN SEVERITY QUANTIFIED, PAIN PRESENT: ICD-10-PCS | Mod: HCNC,CPTII,S$GLB, | Performed by: ANESTHESIOLOGY

## 2023-02-16 PROCEDURE — 99999 PR PBB SHADOW E&M-EST. PATIENT-LVL V: ICD-10-PCS | Mod: PBBFAC,HCNC,, | Performed by: ANESTHESIOLOGY

## 2023-02-16 PROCEDURE — 1101F PR PT FALLS ASSESS DOC 0-1 FALLS W/OUT INJ PAST YR: ICD-10-PCS | Mod: HCNC,CPTII,S$GLB, | Performed by: ANESTHESIOLOGY

## 2023-02-16 PROCEDURE — 99204 PR OFFICE/OUTPT VISIT, NEW, LEVL IV, 45-59 MIN: ICD-10-PCS | Mod: HCNC,S$GLB,, | Performed by: ANESTHESIOLOGY

## 2023-02-16 PROCEDURE — 3078F PR MOST RECENT DIASTOLIC BLOOD PRESSURE < 80 MM HG: ICD-10-PCS | Mod: HCNC,CPTII,S$GLB, | Performed by: ANESTHESIOLOGY

## 2023-02-16 PROCEDURE — 1101F PT FALLS ASSESS-DOCD LE1/YR: CPT | Mod: HCNC,CPTII,S$GLB, | Performed by: ANESTHESIOLOGY

## 2023-02-16 PROCEDURE — 3288F FALL RISK ASSESSMENT DOCD: CPT | Mod: HCNC,CPTII,S$GLB, | Performed by: ANESTHESIOLOGY

## 2023-02-16 PROCEDURE — 99999 PR PBB SHADOW E&M-EST. PATIENT-LVL V: CPT | Mod: PBBFAC,HCNC,, | Performed by: ANESTHESIOLOGY

## 2023-02-16 PROCEDURE — 1125F AMNT PAIN NOTED PAIN PRSNT: CPT | Mod: HCNC,CPTII,S$GLB, | Performed by: ANESTHESIOLOGY

## 2023-02-16 PROCEDURE — 3077F SYST BP >= 140 MM HG: CPT | Mod: HCNC,CPTII,S$GLB, | Performed by: ANESTHESIOLOGY

## 2023-02-16 PROCEDURE — 3077F PR MOST RECENT SYSTOLIC BLOOD PRESSURE >= 140 MM HG: ICD-10-PCS | Mod: HCNC,CPTII,S$GLB, | Performed by: ANESTHESIOLOGY

## 2023-02-16 PROCEDURE — 3078F DIAST BP <80 MM HG: CPT | Mod: HCNC,CPTII,S$GLB, | Performed by: ANESTHESIOLOGY

## 2023-02-16 PROCEDURE — 1159F MED LIST DOCD IN RCRD: CPT | Mod: HCNC,CPTII,S$GLB, | Performed by: ANESTHESIOLOGY

## 2023-02-16 PROCEDURE — 1159F PR MEDICATION LIST DOCUMENTED IN MEDICAL RECORD: ICD-10-PCS | Mod: HCNC,CPTII,S$GLB, | Performed by: ANESTHESIOLOGY

## 2023-02-16 RX ORDER — PREGABALIN 75 MG/1
CAPSULE ORAL
Qty: 45 CAPSULE | Refills: 0 | Status: SHIPPED | OUTPATIENT
Start: 2023-02-16 | End: 2023-03-23 | Stop reason: SDUPTHER

## 2023-02-23 ENCOUNTER — PATIENT MESSAGE (OUTPATIENT)
Dept: ADMINISTRATIVE | Facility: HOSPITAL | Age: 83
End: 2023-02-23
Payer: MEDICARE

## 2023-02-23 ENCOUNTER — TELEPHONE (OUTPATIENT)
Dept: ADMINISTRATIVE | Facility: HOSPITAL | Age: 83
End: 2023-02-23
Payer: MEDICARE

## 2023-03-06 ENCOUNTER — HOSPITAL ENCOUNTER (OUTPATIENT)
Dept: SLEEP MEDICINE | Facility: HOSPITAL | Age: 83
Discharge: HOME | End: 2023-03-06
Attending: INTERNAL MEDICINE
Payer: MEDICARE

## 2023-03-06 DIAGNOSIS — G47.52 CONTROLLED REM SLEEP BEHAVIOR DISORDER: Primary | ICD-10-CM

## 2023-03-06 DIAGNOSIS — G47.33 OSA (OBSTRUCTIVE SLEEP APNEA): Chronic | ICD-10-CM

## 2023-03-06 DIAGNOSIS — G47.61 PLMD (PERIODIC LIMB MOVEMENT DISORDER): ICD-10-CM

## 2023-03-06 PROCEDURE — 95810 POLYSOM 6/> YRS 4/> PARAM: CPT | Mod: HCNC

## 2023-03-06 NOTE — Clinical Note
CONCLUSION: 1. Overall AHI was 2.4/hr 2. SpO2 lamine 90% 3. Sleep talking was observed  4. Limb movements: 665. 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 Sleep hygiene 2. Continue therapy Klonopin + melatonin

## 2023-03-15 PROCEDURE — 95810 PR POLYSOMNOGRAPHY, 4 OR MORE: ICD-10-PCS | Mod: 26,HCNC,, | Performed by: INTERNAL MEDICINE

## 2023-03-15 PROCEDURE — 95810 POLYSOM 6/> YRS 4/> PARAM: CPT | Mod: 26,HCNC,, | Performed by: INTERNAL MEDICINE

## 2023-03-16 NOTE — PROCEDURES
"PATIENT: Srinath Mcknight  study Date: 3/6/2023  Referring Physician: Martin Machuca MD    Indications for Polysomnography: The patient is a 82 year year old Male who is 6' 3" and weighs 216.0 lbs.  His BMI equals 27.1.  A full night polysomnogram was performed to evaluate for -. [place] 7.Known CHARLES, reluctant t wear CPAP. RBD on Klonopin and melatonin. AHI 6.1/hr 38 events.    Polysomnogram Data:  A full night polysomnogram recorded the standard physiologic parameters including EEG, EOG, EMG, EKG, nasal and oral airflow.  Respiratory parameters of chest and abdominal movements were recorded with [place]-Crystal motion transducers.  Oxygen saturation was recorded by pulse oximetry.      Sleep Architecture:  The total recording time of the polysomnogram was 416.0 minutes.  The total sleep time was 369.5 minutes.  The patient spent 8.5% of total sleep time in Stage N1, 80.8% in Stage N2, 0.0% in Stages N3 and N, and 10.7% in REM.   Sleep latency was 12.9 minutes.  REM latency was 100.5 minutes.  Sleep Efficiency was 88.8%.  Sleep Maintenance Efficiency was 90.9%.  Total wake time was 47.0 minutes for a total wake percentage of 8.4%.    Respiratory Events:  The polysomnogram revealed a presence of 2 obstructive, 3 central, and - mixed apneas resulting in an Apnea index of 0.8 events per hour.  There were 10 hypopneas (using 3% desaturation criteria) resulting in a Hypopnea index of 1.6 events per hour.  The combined Apnea/Hypopnea index (using 3% desaturation criteria for Hypopneas) was 2.4 events per hour.    Baseline oxygen saturation was 93.8%.  The lowest oxygen saturation was 90.0%.      LIMB ACTIVITY:  There were 665 limb movements recorded.  Of this total, 665 were classified as PLMs.  Of the PLMs, 50 were associated with arousals.  The Limb Movement index was 108.0 per hour while the PLM index was 108.0 per hour.    CARDIAC SUMMARY:   The average pulse rate was 63.8 bpm.  The minimum pulse rate was 36.0 bpm " "while the maximum pulse rate was 79.0 bpm.                      CONCLUSION:  Overall AHI was 2.4/hr  SpO2 lamine 90%  Sleep talking was observed   Limb movements: 665. 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  You have ordered sleep LAB services to perform the sleep study for Srinath Mcknight .      Please find Sleep Study result in  the "Media tab" of Chart Review menu.        You can look  for the report in the  Media by the document type "Sleep Study Documents". Alphabetizing  "Document type" column helps to find the SLEEP STUDY report  Faster.       As the ordering provider, you are responsible for reviewing the results and implementing a treatment plan with your patient.    If you need a Sleep Medicine provider to explain the sleep study findings and arrange treatment for the patient, please refer patient for consultation to our Sleep Clinic via [facility] with Ambulatory Consult Sleep.     To do that please place an order for an  "Ambulatory Consult Sleep" -  order , it will go to our clinic work queue for our staff  to contact the patient for an appointment.      For any questions, please contact our sleep lab  staff at [phone] to talk to clinical staff          Martin Machuca MD       "

## 2023-03-20 ENCOUNTER — TELEPHONE (OUTPATIENT)
Dept: ADMINISTRATIVE | Facility: HOSPITAL | Age: 83
End: 2023-03-20
Payer: MEDICARE

## 2023-03-22 ENCOUNTER — TELEPHONE (OUTPATIENT)
Dept: PAIN MEDICINE | Facility: CLINIC | Age: 83
End: 2023-03-22
Payer: MEDICARE

## 2023-03-22 NOTE — PROGRESS NOTES
Established Patient Chronic Pain Note (Follow-up Visit)    Referring Physician: Yeison Wilder MD    PCP: Yeison Wilder MD    Chief Complaint:   Chief Complaint   Patient presents with    Knee Pain     right     Low-back Pain - improved    Knee Pain    RIGHT         SUBJECTIVE:    Interval History (3/23/2023):  Srinath Mcknight presents today for follow-up visit.  Patient was last seen on 2/16/2023. At that visit, the plan was to start Lyrica, only talking QHS, which seems to be helping. Patient reports pain as 6/10 today.  His main complaint today is right knee pain.  He had a right knee steroid injection with Dr. Winters on 11/09/2022 with about 3-4 months pain relief.  This was the 1st treatment he has had for his right knee in the past.  Does not have any left knee pain.  Continues to do at home physical therapy exercises, which she feels is helping his back pain.  Overall, stable; pain is better controlled than it was at the last visit.    Initial HPI (2/16/2023):  Srinath Mcknight is a 82 y.o. male with past medical history significant for hypertension, hyperlipidemia, type 2 diabetes, coronary artery disease, urinary incontinence, sickle cell trait, stage 3 chronic kidney disease, gout, obstructive sleep apnea, history of L3-4 lumbar fusion in L4-5 lumbar laminectomy who presents to the clinic for the evaluation of lower back pain.  Of note patient reports 3 prior back surgeries:  The initial several years prior in the left lower back at [facility], and 2 lower back surgeries with Dr. Iqbal within the last 2 years.  Today patient reports pain which is constant which is rated an 8/5.  Pain is described as aching in nature.  Patient reports pain in a bandlike distribution in the lower back without radiation into the buttocks or lower extremities.  Patient does report chronic right-sided knee pain and history of total left hip arthroplasty.  Pain is exacerbated 1st thing in the morning when  arising, with lumbar extension as well as with ambulation.  Patient reports he is able to ambulate only a few steps before requiring rest.  Pain is improved with lumbar support as well as sitting.  Patient also reports receiving a lumbar epidural steroid injection at the back and spine [place] without any meaningful relief.  Patient has completed conventional physical therapy last year and continues physician directed physical therapy exercises at home without meaningful relief.  Patient has been taking gabapentin 600 mg once daily without improvement in his pain.  Patient does endorse weakness in the lower extremities associated with his pain.  Patient reports significant motor weakness.  Patient denies night fever/night sweats, urinary incontinence, bowel incontinence, significant weight loss, and loss of sensations.    Pain Disability Index Review:  Last 3 PDI Scores 2/16/2023 3/20/2018 2/8/2018   Pain Disability Index (PDI) 30 49 68       Non-Pharmacologic Treatments:  Physical Therapy/Home Exercise: yes  Ice/Heat:no  TENS: no  Acupuncture: no  Massage: no  Chiropractic: no    Other: yes; sx x 3: [facility], Dr. Iqbal x 2      Pain Medications:  - Opioids: Percocet (Oxycodone/Acetaminophen)  - Adjuvant Medications: Clonazepam (Klonopin), Neurontin (Gabapentin), and Prednisone (Deltasone)    Relevant sx:  - 3 prior back surgeries:  1st [facility], and 2 lower back surgeries with Dr. Iqbal around 2021  - total left hip arthroplasty (total of 2 surgeries)    Pain Procedures:   [facility]  -02/15/2018: Left-sided sacroiliac joint injection; Dr. Roldan  -01/24/2018: Left-sided L4/5 and L5/S1 transforaminal epidural steroid injection; Dr. Roldan  -06/14/2016: Left L3-4 facet joint injection; Dr. Hutchinson  -05/18/2016: Left hip intra-articular injection posterior approach; Dr. Madera    Orthopedics:  -right knee steroid injection with Dr. Winters on 11/09/2022 with about 3-4 months pain  "relief    Review of Systems:  GENERAL:  No weight loss, malaise or fevers.  HEENT:   No recent changes in vision or hearing  NECK:  Negative for lumps, no difficulty with swallowing.  RESPIRATORY:  Negative for cough, wheezing or shortness of breath, patient denies any recent URI.  CARDIOVASCULAR:  Negative for chest pain or palpitations.  GI:  Negative for abdominal discomfort, blood in stools or black stools or change in bowel habits.  MUSCULOSKELETAL:  See HPI.  SKIN:  Negative for lesions, rash, and itching.  PSYCH:  No mood disorder or recent psychosocial stressors.   HEMATOLOGY/LYMPHOLOGY:  Negative for prolonged bleeding, bruising easily or swollen nodes.    NEURO:   No history of syncope, paralysis, seizures or tremors.  All other reviewed and negative other than HPI.        OBJECTIVE:    Physical Exam:  Vitals:    03/23/23 0932   BP: (!) 148/80   Pulse: 67   Resp: 18   Weight: 98.9 kg (218 lb 0.6 oz)   Height: 6' 3" (1.905 m)   PainSc:   6   PainLoc: Knee    Body mass index is 27.25 kg/m².   (reviewed on 3/23/2023)    GENERAL: Well appearing, in no acute distress, alert and oriented x3.  PSYCH:  Mood and affect appropriate.  SKIN: Skin color, texture, turgor normal, no rashes or lesions.  HEAD/FACE:  Normocephalic, atraumatic.    PULM: No evidence of respiratory difficulty, symmetric chest rise.  GI:  Soft and non-tender.    BACK:  Positive shopping cart sign.  Well-healed 5 in lower lumbar vertical incision.  Straight leg raising in the sitting and supine positions is negative to radicular pain. No pain to palpation over the facet joints of the lumbar spine or spinous processes.  Reduced range of motion with pain reproduction.  EXTREMITIES:  Peripheral joint range of motion is reduced with pain with instability noted bilateral lower extremities. No deformities, edema, or skin discoloration.   MUSCULOSKELETAL: Able to stand on heels but not toes secondary to history of broken toes.     There is no pain " with palpation over the sacroiliac joints bilaterally.  Gaenslen's, Distraction/Compression and  FABERs test is negative.  Facet loading test is negative bilaterally.  Right Knee: pain with extension and flexion. TTP diffusely. Crepitus Present. No pain on left.    BUE & BLE strength is normal and symmetric.  No atrophy or tone abnormalities are noted.    RIGHT Lower extremity: Hip flexion 5/5, Hip Abduction 5/5, Hip Adduction 5/5, Knee extension 5/5, Knee flexion 5/5, Ankle dorsiflexion5/5, Extensor hallucis longus 5/5, Ankle plantarflexion 5/5  LEFT Lower extremity:  Hip flexion 5/5, Hip Abduction 5/5,Hip Adduction 5/5, Knee extension 5/5, Knee flexion 5/5, Ankle dorsiflexion 5/5, Extensor hallucis longus 5/5, Ankle plantarflexion 5/5  -Normal testing knee (patellar) jerk and ankle (achilles) jerk    NEURO: BUE & BLE coordination and muscle stretch reflexes are physiologic and symmetric. No loss of sensation is noted.  GAIT: normal.    Imaging (Reviewed on 3/23/2023):    05/11/21 X-ray Knee Ortho Bilateral with Flexion  COMPARISON:  December 14, 2017  FINDINGS:  Bilateral tricompartment degenerative changes.  There is increased narrowing medial compartments.  No fracture, dislocation or effusions.  Soft tissues within normal limits.  Impression  Bilateral degenerative change without fracture or dislocation.     12/20/17 MRI Lumbar Spine W  WO Contrast    FINDINGS:  Since the previous study there has been a laminectomy at L4-L5. There is enhancement of granulation tissue at the operative site.  No abnormal enhancement surrounds the exiting nerve roots.  The thecal sac measures 9 mm AP at this level.  There is slight mass effect upon the lateral recess without definite compression of the descending L5 nerve roots. There is a synovial cyst with peripheral enhancement projecting centrally and inferiorly from the left facet joint at L3-L4 that demonstrates peripheral enhancement.  It causes mild spinal stenosis at the  level of L4 and is slightly larger than on the previous study from May 2016. The conus medullaris terminates at the level ofL1-L2. No abnormal signal within the conus. Intervertebral disc levels are as follows:    T12-L1 disc: No significant disc pathology. No spinal canal or neural foraminal stenosis.    L1-L2 disc : No significant disc pathology. No spinal canal or neural foraminal stenosis.    L2-L3 disc: Normal disc height with mild degenerative facet changes and thickening of ligamentum flavum.  No significant canal or foraminal stenosis.    L3-L4 disc: Partial distal desiccation with a broad-based posterior disc bulge.  There is a far left lateral annular fissure of the disc.  Moderate to severe degenerative facet change with bilateral facet joint effusions.  The thecal sac measures 10 mm AP but is about 50% of normal cross-sectional area.  Disc material bulges into the floor of the left exit foramen and causes mild to moderate left foraminal stenosis.  There is minimal right foraminal stenosis.  Additionally, there is a synovial cyst projects centrally and inferiorly from the left facet joint.  The synovial cyst demonstrates peripheral enhancement and measures 11 mm craniocaudal by 11 mm transverse by 5 mm AP.  It is best demonstrated on axial T2 series 6 image 23.  It causes mild thecal sac stenosis without definite neural compression.  There is a smaller synovial cyst projecting toward the left as well, remote from any neural structures.    L4-L5 disc: Level of interval laminectomy.  There is enhancing granulation tissue at the operative site without enhancement surrounding the exiting nerve roots.  The thecal sac measures 9 mm AP.  There is mild mass effect upon the lateral recesses without definite compression no descending L5 nerve roots.  Minimal bilateral foraminal stenosis.    L5-S1 disc: Partial does desiccation and disc space height loss.  Height loss is most severe toward the left where there are  marginal osteophytes.  Osteophyte material encroaches into the floor of the left exit foramen causing moderate to severe left foraminal stenosis.  The right neural foramen is minimally narrowed.  Moderate degenerative facet change.  The thecal sac measures 12 mm AP.  IMPRESSION:      1.  There has been an interval laminectomy at L4-L5.  There is enhancing granulation tissue at the operative site.  There is diminished spinal stenosis at this level compared to the previous exam.    2.  Enhancement surrounds a synovial cyst that projects centrally and inferiorly from the left L3-L4 facet joint and causes mild spinal stenosis.  The cyst has increased in size compared to the previous examination.    3.  There is mild to moderate spinal stenosis at L3-L4 where the thecal sac is about 50% of normal cross-sectional area, predominantly related to hypertrophy of the posterior elements.    4.  There is a far left lateral annular fissure of L3-L4 disc.    5.  Moderate to severe left-sided foraminal stenosis at L5-S1.       11/14/22 X-Ray Lumbar Spine AP And Lateral  FINDINGS:  Prior posterior fusion of L3-L4 with interbody spacer.  Normal alignment.  No evidence to suggest hardware failure.  Remaining lumbar vertebral bodies are normal in height and alignment.  There is mild to moderate multilevel degenerative disc disease most pronounced at L5-S1.  SI joints are intact.       11/18/19 X-Ray Cervical Spine AP And Lateral  FINDINGS:  There is visualization of C7-T1 disc level on the lateral view.  Interval progression multilevel degenerative changes throughout the C-spine.  Anterior and posterior marginal spurring with concomitant varying degrees of loss of disc height throughout the C-spine.  No acute fracture or subluxation.  C1-2 articulation appears within normal limits allowing for rotation.  Impression  Interval progression multilevel cervical spondylosis without acute fracture or subluxation.  Follow-up and/or further  evaluation as warranted.        ASSESSMENT: 82 y.o. year old male with lower back pain, consistent with     1. Chronic pain of right knee  IR Aspiration Injection Large Joint W FL    Case Request-RAD/Other Procedure Area: right Synvisc Knee injection      2. History of lumbar laminectomy        3. DDD (degenerative disc disease), lumbar        4. History of left hip replacement          PLAN:   - Interventions:    - Schedule right knee Synvisc-One injection. Previously had right knee steroid injection with Dr. Winters on 11/09/2022 with about 3-4 months pain relief.  - We have previously discussed considering a lumbar transforaminal epidural steroid injection versus caudal epidural steroid injection.  We will 1st review most recent MRI; will hold off for now since low back pain stable. Explained the risks and benefits of the procedure in detail with the patient previously in clinic along with alternative treatment options.    - Anticoagulation use: no no anticoagulation    - Medications:  - Refill Lyrica 75mg QHS for neuropathic pain - started last visit, which he feels is helping.  We previously discussed potential side effects of this medication which may include drowsiness,dizziness, dry mouth, constipation or peripheral edema. Can Increase to 150mg QHS if needed.     report:  Reviewed and consistent with medication use as prescribed.    - Therapy: We have previously discussed independently continuing learned exercises learned at physical therapy to help manage the patient/s painful condition. The patient was previously counseled that muscle strengthening will improve the long term prognosis in regards to pain and may also help increase range of motion and mobility.  He continues learned at home physical therapy exercises religiously, as he does not feel he got any added benefit from going to outpatient therapy.    -Imaging: No new imaging ordered. Previous imaging reviewed.  Will send 2nd request to   [facility] to obtain more recent lumbar MRI.    - Records: Obtain old records from outside physicians and imaging: Dr. Iqbal: [facility]. 2nd request    - Refer: Will send back to Orthopedics if knee pain not improved after interventions.    - Follow up visit: return to clinic in 4-6 weeks after injection/ review records    - This condition does not require this patient to take time off of work, and the primary goal of our Pain Management services is to improve the patient's functional capacity.   - I discussed the risks, benefits, and alternatives to potential treatment options. All questions and concerns were fully addressed today in clinic.         Leatha Franks PA-C  Interventional Pain Management - [facility]    Disclaimer:  This note was prepared using voice recognition system and is likely to have sound alike errors that may have been overlooked even after proof reading.  Please call me with any questions.

## 2023-03-22 NOTE — H&P (VIEW-ONLY)
Established Patient Chronic Pain Note (Follow-up Visit)    Referring Physician: Yeison Wilder MD    PCP: Yeison Wilder MD    Chief Complaint:   Chief Complaint   Patient presents with    Knee Pain     right     Low-back Pain - improved    Knee Pain    RIGHT         SUBJECTIVE:    Interval History (3/23/2023):  Srinath Mcknight presents today for follow-up visit.  Patient was last seen on 2/16/2023. At that visit, the plan was to start Lyrica, only talking QHS, which seems to be helping. Patient reports pain as 6/10 today.  His main complaint today is right knee pain.  He had a right knee steroid injection with Dr. Winters on 11/09/2022 with about 3-4 months pain relief.  This was the 1st treatment he has had for his right knee in the past.  Does not have any left knee pain.  Continues to do at home physical therapy exercises, which she feels is helping his back pain.  Overall, stable; pain is better controlled than it was at the last visit.    Initial HPI (2/16/2023):  Srinath Mcknight is a 82 y.o. male with past medical history significant for hypertension, hyperlipidemia, type 2 diabetes, coronary artery disease, urinary incontinence, sickle cell trait, stage 3 chronic kidney disease, gout, obstructive sleep apnea, history of L3-4 lumbar fusion in L4-5 lumbar laminectomy who presents to the clinic for the evaluation of lower back pain.  Of note patient reports 3 prior back surgeries:  The initial several years prior in the left lower back at [facility], and 2 lower back surgeries with Dr. Iqbal within the last 2 years.  Today patient reports pain which is constant which is rated an 8/5.  Pain is described as aching in nature.  Patient reports pain in a bandlike distribution in the lower back without radiation into the buttocks or lower extremities.  Patient does report chronic right-sided knee pain and history of total left hip arthroplasty.  Pain is exacerbated 1st thing in the morning when  arising, with lumbar extension as well as with ambulation.  Patient reports he is able to ambulate only a few steps before requiring rest.  Pain is improved with lumbar support as well as sitting.  Patient also reports receiving a lumbar epidural steroid injection at the back and spine Ashley without any meaningful relief.  Patient has completed conventional physical therapy last year and continues physician directed physical therapy exercises at home without meaningful relief.  Patient has been taking gabapentin 600 mg once daily without improvement in his pain.  Patient does endorse weakness in the lower extremities associated with his pain.  Patient reports significant motor weakness.  Patient denies night fever/night sweats, urinary incontinence, bowel incontinence, significant weight loss, and loss of sensations.    Pain Disability Index Review:  Last 3 PDI Scores 2/16/2023 3/20/2018 2/8/2018   Pain Disability Index (PDI) 30 49 68       Non-Pharmacologic Treatments:  Physical Therapy/Home Exercise: yes  Ice/Heat:no  TENS: no  Acupuncture: no  Massage: no  Chiropractic: no    Other: yes; sx x 3: [facility], Dr. Iqbal x 2      Pain Medications:  - Opioids: Percocet (Oxycodone/Acetaminophen)  - Adjuvant Medications: Clonazepam (Klonopin), Neurontin (Gabapentin), and Prednisone (Deltasone)    Relevant sx:  - 3 prior back surgeries:  1st [facility], and 2 lower back surgeries with Dr. Iqbal around 2021  - total left hip arthroplasty (total of 2 surgeries)    Pain Procedures:   [facility]  -02/15/2018: Left-sided sacroiliac joint injection; Dr. Roldan  -01/24/2018: Left-sided L4/5 and L5/S1 transforaminal epidural steroid injection; Dr. Roldan  -06/14/2016: Left L3-4 facet joint injection; Dr. Hutchinson  -05/18/2016: Left hip intra-articular injection posterior approach; Dr. Madera    Orthopedics:  -right knee steroid injection with Dr. Winters on 11/09/2022 with about 3-4 months pain  "relief    Review of Systems:  GENERAL:  No weight loss, malaise or fevers.  HEENT:   No recent changes in vision or hearing  NECK:  Negative for lumps, no difficulty with swallowing.  RESPIRATORY:  Negative for cough, wheezing or shortness of breath, patient denies any recent URI.  CARDIOVASCULAR:  Negative for chest pain or palpitations.  GI:  Negative for abdominal discomfort, blood in stools or black stools or change in bowel habits.  MUSCULOSKELETAL:  See HPI.  SKIN:  Negative for lesions, rash, and itching.  PSYCH:  No mood disorder or recent psychosocial stressors.   HEMATOLOGY/LYMPHOLOGY:  Negative for prolonged bleeding, bruising easily or swollen nodes.    NEURO:   No history of syncope, paralysis, seizures or tremors.  All other reviewed and negative other than HPI.        OBJECTIVE:    Physical Exam:  Vitals:    03/23/23 0932   BP: (!) 148/80   Pulse: 67   Resp: 18   Weight: 98.9 kg (218 lb 0.6 oz)   Height: 6' 3" (1.905 m)   PainSc:   6   PainLoc: Knee    Body mass index is 27.25 kg/m².   (reviewed on 3/23/2023)    GENERAL: Well appearing, in no acute distress, alert and oriented x3.  PSYCH:  Mood and affect appropriate.  SKIN: Skin color, texture, turgor normal, no rashes or lesions.  HEAD/FACE:  Normocephalic, atraumatic.    PULM: No evidence of respiratory difficulty, symmetric chest rise.  GI:  Soft and non-tender.    BACK:  Positive shopping cart sign.  Well-healed 5 in lower lumbar vertical incision.  Straight leg raising in the sitting and supine positions is negative to radicular pain. No pain to palpation over the facet joints of the lumbar spine or spinous processes.  Reduced range of motion with pain reproduction.  EXTREMITIES:  Peripheral joint range of motion is reduced with pain with instability noted bilateral lower extremities. No deformities, edema, or skin discoloration.   MUSCULOSKELETAL: Able to stand on heels but not toes secondary to history of broken toes.     There is no pain " with palpation over the sacroiliac joints bilaterally.  Gaenslen's, Distraction/Compression and  FABERs test is negative.  Facet loading test is negative bilaterally.  Right Knee: pain with extension and flexion. TTP diffusely. Crepitus Present. No pain on left.    BUE & BLE strength is normal and symmetric.  No atrophy or tone abnormalities are noted.    RIGHT Lower extremity: Hip flexion 5/5, Hip Abduction 5/5, Hip Adduction 5/5, Knee extension 5/5, Knee flexion 5/5, Ankle dorsiflexion5/5, Extensor hallucis longus 5/5, Ankle plantarflexion 5/5  LEFT Lower extremity:  Hip flexion 5/5, Hip Abduction 5/5,Hip Adduction 5/5, Knee extension 5/5, Knee flexion 5/5, Ankle dorsiflexion 5/5, Extensor hallucis longus 5/5, Ankle plantarflexion 5/5  -Normal testing knee (patellar) jerk and ankle (achilles) jerk    NEURO: BUE & BLE coordination and muscle stretch reflexes are physiologic and symmetric. No loss of sensation is noted.  GAIT: normal.    Imaging (Reviewed on 3/23/2023):    05/11/21 X-ray Knee Ortho Bilateral with Flexion  COMPARISON:  December 14, 2017  FINDINGS:  Bilateral tricompartment degenerative changes.  There is increased narrowing medial compartments.  No fracture, dislocation or effusions.  Soft tissues within normal limits.  Impression  Bilateral degenerative change without fracture or dislocation.     12/20/17 MRI Lumbar Spine W  WO Contrast    FINDINGS:  Since the previous study there has been a laminectomy at L4-L5. There is enhancement of granulation tissue at the operative site.  No abnormal enhancement surrounds the exiting nerve roots.  The thecal sac measures 9 mm AP at this level.  There is slight mass effect upon the lateral recess without definite compression of the descending L5 nerve roots. There is a synovial cyst with peripheral enhancement projecting centrally and inferiorly from the left facet joint at L3-L4 that demonstrates peripheral enhancement.  It causes mild spinal stenosis at the  level of L4 and is slightly larger than on the previous study from May 2016. The conus medullaris terminates at the level ofL1-L2. No abnormal signal within the conus. Intervertebral disc levels are as follows:    T12-L1 disc: No significant disc pathology. No spinal canal or neural foraminal stenosis.    L1-L2 disc : No significant disc pathology. No spinal canal or neural foraminal stenosis.    L2-L3 disc: Normal disc height with mild degenerative facet changes and thickening of ligamentum flavum.  No significant canal or foraminal stenosis.    L3-L4 disc: Partial distal desiccation with a broad-based posterior disc bulge.  There is a far left lateral annular fissure of the disc.  Moderate to severe degenerative facet change with bilateral facet joint effusions.  The thecal sac measures 10 mm AP but is about 50% of normal cross-sectional area.  Disc material bulges into the floor of the left exit foramen and causes mild to moderate left foraminal stenosis.  There is minimal right foraminal stenosis.  Additionally, there is a synovial cyst projects centrally and inferiorly from the left facet joint.  The synovial cyst demonstrates peripheral enhancement and measures 11 mm craniocaudal by 11 mm transverse by 5 mm AP.  It is best demonstrated on axial T2 series 6 image 23.  It causes mild thecal sac stenosis without definite neural compression.  There is a smaller synovial cyst projecting toward the left as well, remote from any neural structures.    L4-L5 disc: Level of interval laminectomy.  There is enhancing granulation tissue at the operative site without enhancement surrounding the exiting nerve roots.  The thecal sac measures 9 mm AP.  There is mild mass effect upon the lateral recesses without definite compression no descending L5 nerve roots.  Minimal bilateral foraminal stenosis.    L5-S1 disc: Partial does desiccation and disc space height loss.  Height loss is most severe toward the left where there are  marginal osteophytes.  Osteophyte material encroaches into the floor of the left exit foramen causing moderate to severe left foraminal stenosis.  The right neural foramen is minimally narrowed.  Moderate degenerative facet change.  The thecal sac measures 12 mm AP.  IMPRESSION:      1.  There has been an interval laminectomy at L4-L5.  There is enhancing granulation tissue at the operative site.  There is diminished spinal stenosis at this level compared to the previous exam.    2.  Enhancement surrounds a synovial cyst that projects centrally and inferiorly from the left L3-L4 facet joint and causes mild spinal stenosis.  The cyst has increased in size compared to the previous examination.    3.  There is mild to moderate spinal stenosis at L3-L4 where the thecal sac is about 50% of normal cross-sectional area, predominantly related to hypertrophy of the posterior elements.    4.  There is a far left lateral annular fissure of L3-L4 disc.    5.  Moderate to severe left-sided foraminal stenosis at L5-S1.       11/14/22 X-Ray Lumbar Spine AP And Lateral  FINDINGS:  Prior posterior fusion of L3-L4 with interbody spacer.  Normal alignment.  No evidence to suggest hardware failure.  Remaining lumbar vertebral bodies are normal in height and alignment.  There is mild to moderate multilevel degenerative disc disease most pronounced at L5-S1.  SI joints are intact.       11/18/19 X-Ray Cervical Spine AP And Lateral  FINDINGS:  There is visualization of C7-T1 disc level on the lateral view.  Interval progression multilevel degenerative changes throughout the C-spine.  Anterior and posterior marginal spurring with concomitant varying degrees of loss of disc height throughout the C-spine.  No acute fracture or subluxation.  C1-2 articulation appears within normal limits allowing for rotation.  Impression  Interval progression multilevel cervical spondylosis without acute fracture or subluxation.  Follow-up and/or further  evaluation as warranted.        ASSESSMENT: 82 y.o. year old male with lower back pain, consistent with     1. Chronic pain of right knee  IR Aspiration Injection Large Joint W FL    Case Request-RAD/Other Procedure Area: right Synvisc Knee injection      2. History of lumbar laminectomy        3. DDD (degenerative disc disease), lumbar        4. History of left hip replacement          PLAN:   - Interventions:    - Schedule right knee Synvisc-One injection. Previously had right knee steroid injection with Dr. Winters on 11/09/2022 with about 3-4 months pain relief.  - We have previously discussed considering a lumbar transforaminal epidural steroid injection versus caudal epidural steroid injection.  We will 1st review most recent MRI; will hold off for now since low back pain stable. Explained the risks and benefits of the procedure in detail with the patient previously in clinic along with alternative treatment options.    - Anticoagulation use: no no anticoagulation    - Medications:  - Refill Lyrica 75mg QHS for neuropathic pain - started last visit, which he feels is helping.  We previously discussed potential side effects of this medication which may include drowsiness,dizziness, dry mouth, constipation or peripheral edema. Can Increase to 150mg QHS if needed.     report:  Reviewed and consistent with medication use as prescribed.    - Therapy: We have previously discussed independently continuing learned exercises learned at physical therapy to help manage the patient/s painful condition. The patient was previously counseled that muscle strengthening will improve the long term prognosis in regards to pain and may also help increase range of motion and mobility.  He continues learned at home physical therapy exercises religiously, as he does not feel he got any added benefit from going to outpatient therapy.    -Imaging: No new imaging ordered. Previous imaging reviewed.  Will send 2nd request to   [facility] to obtain more recent lumbar MRI.    - Records: Obtain old records from outside physicians and imaging: Dr. Iqbal: [facility]. 2nd request    - Refer: Will send back to Orthopedics if knee pain not improved after interventions.    - Follow up visit: return to clinic in 4-6 weeks after injection/ review records    - This condition does not require this patient to take time off of work, and the primary goal of our Pain Management services is to improve the patient's functional capacity.   - I discussed the risks, benefits, and alternatives to potential treatment options. All questions and concerns were fully addressed today in clinic.         Leatha Franks PA-C  Interventional Pain Management - [facility]    Disclaimer:  This note was prepared using voice recognition system and is likely to have sound alike errors that may have been overlooked even after proof reading.  Please call me with any questions.

## 2023-03-23 ENCOUNTER — OFFICE VISIT (OUTPATIENT)
Dept: PAIN MEDICINE | Facility: CLINIC | Age: 83
End: 2023-03-23
Payer: MEDICARE

## 2023-03-23 VITALS
BODY MASS INDEX: 27.11 KG/M2 | DIASTOLIC BLOOD PRESSURE: 80 MMHG | SYSTOLIC BLOOD PRESSURE: 148 MMHG | RESPIRATION RATE: 18 BRPM | HEART RATE: 67 BPM | WEIGHT: 218.06 LBS | HEIGHT: 75 IN

## 2023-03-23 DIAGNOSIS — Z96.642 HISTORY OF LEFT HIP REPLACEMENT: ICD-10-CM

## 2023-03-23 DIAGNOSIS — Z98.890 HISTORY OF LUMBAR LAMINECTOMY: ICD-10-CM

## 2023-03-23 DIAGNOSIS — M25.561 CHRONIC PAIN OF RIGHT KNEE: Primary | ICD-10-CM

## 2023-03-23 DIAGNOSIS — M51.36 DDD (DEGENERATIVE DISC DISEASE), LUMBAR: ICD-10-CM

## 2023-03-23 DIAGNOSIS — G89.29 CHRONIC PAIN OF RIGHT KNEE: Primary | ICD-10-CM

## 2023-03-23 PROCEDURE — 3079F PR MOST RECENT DIASTOLIC BLOOD PRESSURE 80-89 MM HG: ICD-10-PCS | Mod: HCNC,CPTII,S$GLB, | Performed by: PHYSICIAN ASSISTANT

## 2023-03-23 PROCEDURE — 99214 PR OFFICE/OUTPT VISIT, EST, LEVL IV, 30-39 MIN: ICD-10-PCS | Mod: HCNC,S$GLB,, | Performed by: PHYSICIAN ASSISTANT

## 2023-03-23 PROCEDURE — 1125F PR PAIN SEVERITY QUANTIFIED, PAIN PRESENT: ICD-10-PCS | Mod: HCNC,CPTII,S$GLB, | Performed by: PHYSICIAN ASSISTANT

## 2023-03-23 PROCEDURE — 99999 PR PBB SHADOW E&M-EST. PATIENT-LVL V: CPT | Mod: PBBFAC,HCNC,, | Performed by: PHYSICIAN ASSISTANT

## 2023-03-23 PROCEDURE — 1160F PR REVIEW ALL MEDS BY PRESCRIBER/CLIN PHARMACIST DOCUMENTED: ICD-10-PCS | Mod: HCNC,CPTII,S$GLB, | Performed by: PHYSICIAN ASSISTANT

## 2023-03-23 PROCEDURE — 1159F PR MEDICATION LIST DOCUMENTED IN MEDICAL RECORD: ICD-10-PCS | Mod: HCNC,CPTII,S$GLB, | Performed by: PHYSICIAN ASSISTANT

## 2023-03-23 PROCEDURE — 3077F PR MOST RECENT SYSTOLIC BLOOD PRESSURE >= 140 MM HG: ICD-10-PCS | Mod: HCNC,CPTII,S$GLB, | Performed by: PHYSICIAN ASSISTANT

## 2023-03-23 PROCEDURE — 99214 OFFICE O/P EST MOD 30 MIN: CPT | Mod: HCNC,S$GLB,, | Performed by: PHYSICIAN ASSISTANT

## 2023-03-23 PROCEDURE — 3077F SYST BP >= 140 MM HG: CPT | Mod: HCNC,CPTII,S$GLB, | Performed by: PHYSICIAN ASSISTANT

## 2023-03-23 PROCEDURE — 3079F DIAST BP 80-89 MM HG: CPT | Mod: HCNC,CPTII,S$GLB, | Performed by: PHYSICIAN ASSISTANT

## 2023-03-23 PROCEDURE — 1101F PT FALLS ASSESS-DOCD LE1/YR: CPT | Mod: HCNC,CPTII,S$GLB, | Performed by: PHYSICIAN ASSISTANT

## 2023-03-23 PROCEDURE — 1159F MED LIST DOCD IN RCRD: CPT | Mod: HCNC,CPTII,S$GLB, | Performed by: PHYSICIAN ASSISTANT

## 2023-03-23 PROCEDURE — 99999 PR PBB SHADOW E&M-EST. PATIENT-LVL V: ICD-10-PCS | Mod: PBBFAC,HCNC,, | Performed by: PHYSICIAN ASSISTANT

## 2023-03-23 PROCEDURE — 1125F AMNT PAIN NOTED PAIN PRSNT: CPT | Mod: HCNC,CPTII,S$GLB, | Performed by: PHYSICIAN ASSISTANT

## 2023-03-23 PROCEDURE — 3288F FALL RISK ASSESSMENT DOCD: CPT | Mod: HCNC,CPTII,S$GLB, | Performed by: PHYSICIAN ASSISTANT

## 2023-03-23 PROCEDURE — 1160F RVW MEDS BY RX/DR IN RCRD: CPT | Mod: HCNC,CPTII,S$GLB, | Performed by: PHYSICIAN ASSISTANT

## 2023-03-23 PROCEDURE — 3288F PR FALLS RISK ASSESSMENT DOCUMENTED: ICD-10-PCS | Mod: HCNC,CPTII,S$GLB, | Performed by: PHYSICIAN ASSISTANT

## 2023-03-23 PROCEDURE — 1101F PR PT FALLS ASSESS DOC 0-1 FALLS W/OUT INJ PAST YR: ICD-10-PCS | Mod: HCNC,CPTII,S$GLB, | Performed by: PHYSICIAN ASSISTANT

## 2023-03-23 RX ORDER — PREGABALIN 75 MG/1
75-150 CAPSULE ORAL NIGHTLY
Qty: 60 CAPSULE | Refills: 0 | Status: SHIPPED | OUTPATIENT
Start: 2023-03-23 | End: 2023-05-11 | Stop reason: SDUPTHER

## 2023-03-30 ENCOUNTER — OFFICE VISIT (OUTPATIENT)
Dept: SLEEP MEDICINE | Facility: CLINIC | Age: 83
End: 2023-03-30
Payer: MEDICARE

## 2023-03-30 ENCOUNTER — LAB VISIT (OUTPATIENT)
Dept: LAB | Facility: HOSPITAL | Age: 83
End: 2023-03-30
Attending: INTERNAL MEDICINE
Payer: MEDICARE

## 2023-03-30 VITALS
RESPIRATION RATE: 21 BRPM | HEIGHT: 75 IN | WEIGHT: 217.81 LBS | HEART RATE: 70 BPM | DIASTOLIC BLOOD PRESSURE: 68 MMHG | OXYGEN SATURATION: 98 % | BODY MASS INDEX: 27.08 KG/M2 | SYSTOLIC BLOOD PRESSURE: 160 MMHG

## 2023-03-30 DIAGNOSIS — E11.69 COMBINED HYPERLIPIDEMIA ASSOCIATED WITH TYPE 2 DIABETES MELLITUS: Chronic | ICD-10-CM

## 2023-03-30 DIAGNOSIS — R94.2 RESTRICTIVE VENTILATORY DEFECT: ICD-10-CM

## 2023-03-30 DIAGNOSIS — G47.52 CONTROLLED REM SLEEP BEHAVIOR DISORDER: ICD-10-CM

## 2023-03-30 DIAGNOSIS — I15.2 HYPERTENSION ASSOCIATED WITH DIABETES: ICD-10-CM

## 2023-03-30 DIAGNOSIS — R94.2 DIFFUSION CAPACITY OF LUNG (DL), DECREASED: Primary | ICD-10-CM

## 2023-03-30 DIAGNOSIS — E11.42 TYPE 2 DIABETES MELLITUS WITH DIABETIC POLYNEUROPATHY, WITHOUT LONG-TERM CURRENT USE OF INSULIN: Chronic | ICD-10-CM

## 2023-03-30 DIAGNOSIS — E11.59 HYPERTENSION ASSOCIATED WITH DIABETES: ICD-10-CM

## 2023-03-30 DIAGNOSIS — E78.2 COMBINED HYPERLIPIDEMIA ASSOCIATED WITH TYPE 2 DIABETES MELLITUS: Chronic | ICD-10-CM

## 2023-03-30 DIAGNOSIS — E11.22 DIABETES MELLITUS WITH STAGE 3 CHRONIC KIDNEY DISEASE: ICD-10-CM

## 2023-03-30 DIAGNOSIS — E11.51 TYPE 2 DIABETES MELLITUS WITH DIABETIC PERIPHERAL ANGIOPATHY WITHOUT GANGRENE, WITHOUT LONG-TERM CURRENT USE OF INSULIN: ICD-10-CM

## 2023-03-30 DIAGNOSIS — N18.30 DIABETES MELLITUS WITH STAGE 3 CHRONIC KIDNEY DISEASE: ICD-10-CM

## 2023-03-30 DIAGNOSIS — G47.61 PLMD (PERIODIC LIMB MOVEMENT DISORDER): Chronic | ICD-10-CM

## 2023-03-30 LAB
ALBUMIN/CREAT UR: 5 UG/MG (ref 0–30)
CREAT UR-MCNC: 121 MG/DL (ref 23–375)
MICROALBUMIN UR DL<=1MG/L-MCNC: 6 UG/ML

## 2023-03-30 PROCEDURE — 3078F PR MOST RECENT DIASTOLIC BLOOD PRESSURE < 80 MM HG: ICD-10-PCS | Mod: HCNC,CPTII,S$GLB, | Performed by: INTERNAL MEDICINE

## 2023-03-30 PROCEDURE — 3288F FALL RISK ASSESSMENT DOCD: CPT | Mod: HCNC,CPTII,S$GLB, | Performed by: INTERNAL MEDICINE

## 2023-03-30 PROCEDURE — 3077F PR MOST RECENT SYSTOLIC BLOOD PRESSURE >= 140 MM HG: ICD-10-PCS | Mod: HCNC,CPTII,S$GLB, | Performed by: INTERNAL MEDICINE

## 2023-03-30 PROCEDURE — 82570 ASSAY OF URINE CREATININE: CPT | Mod: HCNC | Performed by: INTERNAL MEDICINE

## 2023-03-30 PROCEDURE — 99499 RISK ADDL DX/OHS AUDIT: ICD-10-PCS | Mod: HCNC,S$GLB,, | Performed by: INTERNAL MEDICINE

## 2023-03-30 PROCEDURE — 1101F PR PT FALLS ASSESS DOC 0-1 FALLS W/OUT INJ PAST YR: ICD-10-PCS | Mod: HCNC,CPTII,S$GLB, | Performed by: INTERNAL MEDICINE

## 2023-03-30 PROCEDURE — 99499 UNLISTED E&M SERVICE: CPT | Mod: HCNC,S$GLB,, | Performed by: INTERNAL MEDICINE

## 2023-03-30 PROCEDURE — 1159F PR MEDICATION LIST DOCUMENTED IN MEDICAL RECORD: ICD-10-PCS | Mod: HCNC,CPTII,S$GLB, | Performed by: INTERNAL MEDICINE

## 2023-03-30 PROCEDURE — 99213 OFFICE O/P EST LOW 20 MIN: CPT | Mod: HCNC,S$GLB,, | Performed by: INTERNAL MEDICINE

## 2023-03-30 PROCEDURE — 3288F PR FALLS RISK ASSESSMENT DOCUMENTED: ICD-10-PCS | Mod: HCNC,CPTII,S$GLB, | Performed by: INTERNAL MEDICINE

## 2023-03-30 PROCEDURE — 1159F MED LIST DOCD IN RCRD: CPT | Mod: HCNC,CPTII,S$GLB, | Performed by: INTERNAL MEDICINE

## 2023-03-30 PROCEDURE — 99999 PR PBB SHADOW E&M-EST. PATIENT-LVL V: CPT | Mod: PBBFAC,HCNC,, | Performed by: INTERNAL MEDICINE

## 2023-03-30 PROCEDURE — 99213 PR OFFICE/OUTPT VISIT, EST, LEVL III, 20-29 MIN: ICD-10-PCS | Mod: HCNC,S$GLB,, | Performed by: INTERNAL MEDICINE

## 2023-03-30 PROCEDURE — 3078F DIAST BP <80 MM HG: CPT | Mod: HCNC,CPTII,S$GLB, | Performed by: INTERNAL MEDICINE

## 2023-03-30 PROCEDURE — 99999 PR PBB SHADOW E&M-EST. PATIENT-LVL V: ICD-10-PCS | Mod: PBBFAC,HCNC,, | Performed by: INTERNAL MEDICINE

## 2023-03-30 PROCEDURE — 1160F PR REVIEW ALL MEDS BY PRESCRIBER/CLIN PHARMACIST DOCUMENTED: ICD-10-PCS | Mod: HCNC,CPTII,S$GLB, | Performed by: INTERNAL MEDICINE

## 2023-03-30 PROCEDURE — 1101F PT FALLS ASSESS-DOCD LE1/YR: CPT | Mod: HCNC,CPTII,S$GLB, | Performed by: INTERNAL MEDICINE

## 2023-03-30 PROCEDURE — 3077F SYST BP >= 140 MM HG: CPT | Mod: HCNC,CPTII,S$GLB, | Performed by: INTERNAL MEDICINE

## 2023-03-30 PROCEDURE — 1160F RVW MEDS BY RX/DR IN RCRD: CPT | Mod: HCNC,CPTII,S$GLB, | Performed by: INTERNAL MEDICINE

## 2023-03-30 RX ORDER — CIPROFLOXACIN HYDROCHLORIDE 3 MG/ML
SOLUTION/ DROPS OPHTHALMIC
COMMUNITY
Start: 2023-02-15 | End: 2023-12-18

## 2023-03-30 RX ORDER — PRAMIPEXOLE DIHYDROCHLORIDE 0.12 MG/1
0.12 TABLET ORAL NIGHTLY
Qty: 30 TABLET | Refills: 3 | Status: SHIPPED | OUTPATIENT
Start: 2023-03-30 | End: 2023-08-11

## 2023-03-30 RX ORDER — BNT162B2 ORIGINAL AND OMICRON BA.4/BA.5 .1125; .1125 MG/2.25ML; MG/2.25ML
INJECTION, SUSPENSION INTRAMUSCULAR
COMMUNITY
Start: 2023-02-16 | End: 2023-12-27

## 2023-03-30 NOTE — PROGRESS NOTES
Subjective:       Srinath Mcknight is a 82 y.o. male   Last visit was 07/26/2018  Has been doing overall well.  [place] score 5. Recent revision hip Left  His major sleep issues a REM behavior disorder, PLMD and obstructive sleep apnea  With regard to obstructive sleep apnea and this is borderline patient has been reluctant to use CPAP in past.  REM behavioral events have been very infrequent less than once or twice in the month  He is stable on a regimen of clonidine 0.5 mg.  And melatonin   He is not taking Mirapex for his periodic limb movement  No cough no wheezing or shortness of breath  I have reviewed the patient's medical history in detail and updated the computerized patient record.    01/05/2022  Follow up visit to review tersts  C/o easily fatigue, DAMON   See cardiology , -ve w/u  Seen Dr Zimmer  ABG: Compensated resp alkalosis  6MWD:Reduced exercise capacity, 32.42%, Dyspnea wa grade 3, SpO2 100%, Peak BP and HR was 103 BPM, /64  PFT: Normal Spirometry:Mild restriction: DLCO mild reduced  Bed time:1130pm  Wake time 9 am  Appear to have stopped Klonopin and Melatonin while in hospital, says wife observed sleep reenactment activity    06/15/2022:  Last visit was 01/05/2022  Interested in Pul rehab: did not qualify last testing  No cough, No wheezing  SOB: frustrating after hip replacement and back surgery  PFT: low MVV and restriction  Not on Oxygen  Has GAYLA inhaler, not sure  regardinfg sleep issues  Stable on Klonopin and Melatonin  No reenactment    07/29/2022  Reviewed PFT and Walk  Improved from 32.4% to 44.21%  SNIFF was normal  [place] 4  Accepted to pul rehab  PFT: restriction with reduced DLCO    02/08/2023  Last visit 07/29/2022  Snoring when sleep on backEpworth 7  Bed time 11 pm, wake time 11am  Taking Melatonin  No sleep related movements  Klonopin + Melatonin  No SOB,   [place] 7  Has not walked out of bed recently  PSG 2017 reveiwed  Reluctant to wear CPAP in past   BMI increased  from 27.9 to 28  Increased snoring     03/30/2023  Last visit  Here to review PSG  AHI was 2.4/hr  Sleep talking was observed x 1  No RBD  .0/hr with 50 associated with arousal  Added Mirapex  No CHARLES   Snoring  with SpO 2 90%         Previous Report(s) Reviewed: historical medical records and office notes     The following portions of the patient's history were reviewed and updated as appropriate:   He  has a past medical history of Anemia due to unknown mechanism (11/10/2015), Angina pectoris (2014), Arthritis, Back pain, Coronary artery disease, Diabetes mellitus with stage 3 chronic kidney disease (11/18/2020), DM (diabetes mellitus) (25-30 years), DM (diabetes mellitus), Encounter for blood transfusion, Gout (12/05/2017), History of blood transfusion, Hyperlipemia, Hypertension, CHARLES (obstructive sleep apnea), Polyneuropathy, Spinal cord disease, 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  He does not have any pertinent problems on file.  He  has a past surgical history that includes Rotator cuff repair (Left, 2006); Shoulder arthroscopy w/ rotator cuff repair (Right, 2009); Laminectomy (07/22/2016); Hernia repair (Bilateral, 04/18/2017); Joint replacement (Left, 10/09/2017); Back surgery (2019); lumbar foraminotomy (03/2018); left elbow (10/2017); Revision total hip arthroplasty (Left, 9/11/2018); Esophagogastroduodenoscopy (N/A, 6/30/2020); and Colonoscopy (N/A, 6/30/2020).  His family history includes Cancer (age of onset: 50) in his brother; Diabetes in his father, mother, and sister; Drug abuse in his brother; Heart disease in his father and mother; Hypertension in his father and mother; Stroke in his father.  He  reports that he has never smoked. He has never used smokeless tobacco. He reports current alcohol use of about 1.0  standard drink per week. He reports that he does not use drugs.  He has a current medication list which includes the following prescription(s): albuterol, allopurinol, bisacodyl, blood glucose control, low, blood sugar diagnostic, true metrix air glucose meter, cetirizine, ciprofloxacin hcl, clonazepam, cyanocobalamin (vitamin b-12), doxepin, garlic extract, jardiance, lancets, lexiscan, losartan, metformin, metoprolol succinate, mometasone 0.1%, moxifloxacin, multivitamin, olopatadine, papaverine, pfizer covid bival(12y up)(pf), pramipexole, pravastatin, prednisolone acetate, and pregabalin.  Current Outpatient Medications on File Prior to Visit   Medication Sig Dispense Refill    albuterol (VENTOLIN HFA) 90 mcg/actuation inhaler Inhale 2 puffs into the lungs every 6 (six) hours as needed for Wheezing or Shortness of Breath. Rescue 18 g 3    allopurinoL (ZYLOPRIM) 100 MG tablet TAKE 1 TABLET EVERY DAY 90 tablet 3    bisacodyl (DULCOLAX) 10 mg Supp   0    blood glucose control, low Soln by Misc.(Non-Drug; Combo Route) route.      blood sugar diagnostic (TRUE METRIX GLUCOSE TEST STRIP) Strp Use as directed to check sugars 100 strip 11    blood-glucose meter (TRUE METRIX AIR GLUCOSE METER) kit USE AS DIRECTED 1 each 0    cetirizine (ZYRTEC) 10 MG tablet Take 1 tablet (10 mg total) by mouth every evening. for 14 days 14 tablet 0    ciprofloxacin HCl (CILOXAN) 0.3 % ophthalmic solution INSTILL 1 DROP IN RIGHT EYE TWICE DAILY FOR 1 WEEK THEN STOP      clonazePAM (KLONOPIN) 0.5 MG tablet TAKE 1 TABLET EVERY EVENING 30 tablet 3    cyanocobalamin, vitamin B-12, 1,000 mcg Subl Place 1,000 mcg under the tongue once daily. 90 tablet 3    doxepin (SINEQUAN) 10 MG capsule TAKE 1 CAPSULE(10 MG) BY MOUTH EVERY EVENING 90 capsule 0    GARLIC EXTRACT ORAL Take 1 tablet by mouth once daily. Per [facility]      JARDIANCE 10 mg tablet       lancets Misc Use as directed to check sugars 100 each 11    LEXISCAN 0.4 mg/5 mL Syrg     "   losartan (COZAAR) 50 MG tablet Take 1 tablet (50 mg total) by mouth once daily. 90 tablet 3    metFORMIN (GLUCOPHAGE) 1000 MG tablet TAKE 1 TABLET TWICE DAILY WITH MEALS 180 tablet 3    metoprolol succinate (TOPROL-XL) 50 MG 24 hr tablet TAKE 1 TABLET EVERY DAY 90 tablet 3    mometasone 0.1% (ELOCON) 0.1 % cream Apply topically once daily. 50 g 2    moxifloxacin (VIGAMOX) 0.5 % ophthalmic solution       multivitamin (THERAGRAN) per tablet Take 1 tablet by mouth once daily.      olopatadine (PATANOL) 0.1 % ophthalmic solution Place 1 drop into the right eye 2 (two) times daily. 5 mL 0    papaverine 30 mg/mL injection Inject 0.3 ml of trimix solution prn ED max once daily  Prepare 10ml of trimix solution containing:    Papaverine 30mg/ml    Phentolamine 1 mg/ml    Alprostadil 10mcg/ml  Dispense 10ml per refill  Qs syringes 1cc/30g/0.5" and alcohol swabs dispense as needed for Intracavernosal injection 10 mL 5    PFIZER COVID BIVAL,12Y UP,,PF, 30 mcg/0.3 mL injection       pravastatin (PRAVACHOL) 40 MG tablet TAKE 1 TABLET EVERY DAY 90 tablet 2    prednisoLONE acetate (PRED FORTE) 1 % DrpS       pregabalin (LYRICA) 75 MG capsule Take 1-2 capsules ( mg total) by mouth every evening. 60 capsule 0     No current facility-administered medications on file prior to visit.     He is allergic to sulfa (sulfonamide antibiotics)..     Review of Systems   Constitutional:  Positive for malaise/fatigue.   Eyes: Negative.    Respiratory:          Snoring   Cardiovascular: Negative.    Genitourinary: Negative.    Musculoskeletal: Negative.    Neurological: Negative.    Endo/Heme/Allergies: Negative.    Psychiatric/Behavioral:  Positive for memory loss.    All other systems reviewed and are negative.      Objective:      Vitals:    03/30/23 0917   BP: (!) 160/68   Pulse: 70   Resp: (!) 21   SpO2: 98%   Weight: 98.8 kg (217 lb 13 oz)   Height: 6' 3" (1.905 m)         Using cane    Physical Exam  Vitals and nursing note " "reviewed.   Constitutional:       Appearance: He is well-developed.   HENT:      Head: Normocephalic and atraumatic.      Nose: Nose normal.   Eyes:      General:         Left eye: No discharge.      Pupils: Pupils are equal, round, and reactive to light.   Neck:      Vascular: No JVD.   Cardiovascular:      Rate and Rhythm: Normal rate and regular rhythm.      Heart sounds: Normal heart sounds. No murmur heard.  Pulmonary:      Effort: Pulmonary effort is normal. No respiratory distress.   Abdominal:      General: Bowel sounds are normal.      Palpations: Abdomen is soft.   Musculoskeletal:         General: No deformity. Normal range of motion.      Cervical back: Normal range of motion and neck supple.   Skin:     General: Skin is warm and dry.   Neurological:      Mental Status: He is alert and oriented to person, place, and time.      Deep Tendon Reflexes: Reflexes are normal and symmetric.     PATIENT: Srinath Mcknight  STUDY DATE: 3/6/2023  REFERRING PHYSICIAN: Martin Machuca MD     INDICATIONS FOR POLYSOMNOGRAPHY: The patient is a 82 year year old Male who is 6' 3" and weighs 216.0 lbs.  His BMI equals 27.1.  A full night polysomnogram was performed to evaluate for -. [place] 7.Known CHARLES, reluctant t wear CPAP. RBD on Klonopin and melatonin. AHI 6.1/hr 38 events.     POLYSOMNOGRAM DATA:  A full night polysomnogram recorded the standard physiologic parameters including EEG, EOG, EMG, EKG, nasal and oral airflow.  Respiratory parameters of chest and abdominal movements were recorded with [place]-Crystal motion transducers.  Oxygen saturation was recorded by pulse oximetry.       SLEEP ARCHITECTURE:  The total recording time of the polysomnogram was 416.0 minutes.  The total sleep time was 369.5 minutes.  The patient spent 8.5% of total sleep time in Stage N1, 80.8% in Stage N2, 0.0% in Stages N3 and N, and 10.7% in REM.   Sleep latency was 12.9 minutes.  REM latency was 100.5 minutes.  Sleep Efficiency was " 88.8%.  Sleep Maintenance Efficiency was 90.9%.  Total wake time was 47.0 minutes for a total wake percentage of 8.4%.     RESPIRATORY EVENTS:  The polysomnogram revealed a presence of 2 obstructive, 3 central, and - mixed apneas resulting in an Apnea index of 0.8 events per hour.  There were 10 hypopneas (using 3% desaturation criteria) resulting in a Hypopnea index of 1.6 events per hour.  The combined Apnea/Hypopnea index (using 3% desaturation criteria for Hypopneas) was 2.4 events per hour.     Baseline oxygen saturation was 93.8%.  The lowest oxygen saturation was 90.0%.       LIMB ACTIVITY:  There were 665 limb movements recorded.  Of this total, 665 were classified as PLMs.  Of the PLMs, 50 were associated with arousals.  The Limb Movement index was 108.0 per hour while the PLM index was 108.0 per hour.     CARDIAC SUMMARY:   The average pulse rate was 63.8 bpm.  The minimum pulse rate was 36.0 bpm while the maximum pulse rate was 79.0 bpm.               CONCLUSION:  Overall AHI was 2.4/hr  SpO2 lamine 90%  Sleep talking was observed   Limb movements: 665. PLM index 108.0/hr, with only 50 associated with arousals        DIAGNOSIS: REM sleep Behavior Disorder / 327.42, PLMD        RECOMMENDATIONS:     Sleep hygiene  Continue therapy Klonopin + melatonin    Assessment:      Problem List Items Addressed This Visit       Type 2 diabetes mellitus with diabetic polyneuropathy, without long-term current use of insulin (Chronic)    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)     Stable on Pravachol         PLMD (periodic limb movement disorder) (Chronic)     Seel on PSG, with arousal  Added Mirapex QHS         Relevant Medications    pramipexole (MIRAPEX) 0.125 MG tablet    Diabetes mellitus with stage 3 chronic kidney disease    Hypertension associated with diabetes     Stable on COZAAR         Type 2 diabetes mellitus with diabetic peripheral angiopathy without gangrene, without  long-term current use of insulin     Stable on JARDIANCE         Controlled REM sleep behavior disorder     Stable Continue Melatonin + Konopin         Diffusion capacity of lung (dl), decreased - Primary     Stable  Not on O2     DLCO 64.4%         Restrictive ventilatory defect     Stable  TLC 77.9%           Plan:      Overall stable.   Continue current regime: Melatonin and Klonopin   Added Mirapex      Requested Prescriptions     Signed Prescriptions Disp Refills    pramipexole (MIRAPEX) 0.125 MG tablet 30 tablet 3     Sig: Take 1 tablet (0.125 mg total) by mouth every evening.         Requested Prescriptions     Signed Prescriptions Disp Refills    pramipexole (MIRAPEX) 0.125 MG tablet 30 tablet 3     Sig: Take 1 tablet (0.125 mg total) by mouth every evening.         Follow up in about 3 months (around 6/30/2023), or Added MIRAPEX, Cont Klonopin And MELATONIN.    This note was prepared using voice recognition system and is likely to have sound alike errors that may have been overlooked even after proof reading.  Please call me with any questions    Discussed diagnosis, its evaluation, treatment and usual course. All questions answered.    Thank you for the courtesy of participating in the care of this patient    Martin Machuca MD          Interaction check  Title Pramipexole / CNS Depressants  Risk Rating C: Monitor therapy  Summary CNS Depressants may enhance the sedative effect of Pramipexole. Severity Moderate Reliability Rating Fair: Reported in the prescribing information  Patient Management Monitor for enhanced sedative effects when pramipexole is used in combination with other sedating drugs or alcohol. Advise patients who receive pramipexole about the potential for this interaction.

## 2023-04-05 NOTE — PRE-PROCEDURE INSTRUCTIONS
Spoke with patient regarding procedure scheduled on 4.14     Arrival time 0630     Has patient been sick with fever or on antibiotics within the last 7 days? No     Does the patient have any open wounds, sores or rashes? No     Does the patient have any recent fractures? no     Has patient received a vaccination within the last 7 days? No     Received the COVID vaccination?      Has the patient stopped all medications as directed? na     Does patient have a pacemaker and or defibrillator? no     Does the patient have a ride to and from procedure and someone reliable to remain with patient? jaylen      Is the patient diabetic? yes     Does the patient have sleep apnea? Or use O2 at home? no     Is the patient receiving sedation?      Is the patient instructed to remain NPO beginning at midnight the night before their procedure? yes     Procedure location confirmed with patient? Yes     Covid- Denies signs/symptoms. Instructed to notify PAT/MD if any changes.

## 2023-04-06 ENCOUNTER — OFFICE VISIT (OUTPATIENT)
Dept: INTERNAL MEDICINE | Facility: CLINIC | Age: 83
End: 2023-04-06
Payer: MEDICARE

## 2023-04-06 VITALS
BODY MASS INDEX: 27.58 KG/M2 | DIASTOLIC BLOOD PRESSURE: 70 MMHG | HEIGHT: 75 IN | WEIGHT: 221.81 LBS | OXYGEN SATURATION: 99 % | TEMPERATURE: 96 F | SYSTOLIC BLOOD PRESSURE: 138 MMHG | HEART RATE: 79 BPM

## 2023-04-06 DIAGNOSIS — I15.2 HYPERTENSION ASSOCIATED WITH DIABETES: ICD-10-CM

## 2023-04-06 DIAGNOSIS — J31.0 RHINITIS, UNSPECIFIED TYPE: ICD-10-CM

## 2023-04-06 DIAGNOSIS — R53.83 FATIGUE, UNSPECIFIED TYPE: ICD-10-CM

## 2023-04-06 DIAGNOSIS — E11.59 HYPERTENSION ASSOCIATED WITH DIABETES: ICD-10-CM

## 2023-04-06 DIAGNOSIS — E11.42 TYPE 2 DIABETES MELLITUS WITH DIABETIC POLYNEUROPATHY, WITHOUT LONG-TERM CURRENT USE OF INSULIN: Primary | ICD-10-CM

## 2023-04-06 DIAGNOSIS — K59.00 CONSTIPATION, UNSPECIFIED CONSTIPATION TYPE: ICD-10-CM

## 2023-04-06 PROCEDURE — 1101F PR PT FALLS ASSESS DOC 0-1 FALLS W/OUT INJ PAST YR: ICD-10-PCS | Mod: HCNC,CPTII,S$GLB, | Performed by: NURSE PRACTITIONER

## 2023-04-06 PROCEDURE — 1101F PT FALLS ASSESS-DOCD LE1/YR: CPT | Mod: HCNC,CPTII,S$GLB, | Performed by: NURSE PRACTITIONER

## 2023-04-06 PROCEDURE — 1125F PR PAIN SEVERITY QUANTIFIED, PAIN PRESENT: ICD-10-PCS | Mod: HCNC,CPTII,S$GLB, | Performed by: NURSE PRACTITIONER

## 2023-04-06 PROCEDURE — 99999 PR PBB SHADOW E&M-EST. PATIENT-LVL III: CPT | Mod: PBBFAC,HCNC,, | Performed by: NURSE PRACTITIONER

## 2023-04-06 PROCEDURE — 1160F RVW MEDS BY RX/DR IN RCRD: CPT | Mod: HCNC,CPTII,S$GLB, | Performed by: NURSE PRACTITIONER

## 2023-04-06 PROCEDURE — 1159F MED LIST DOCD IN RCRD: CPT | Mod: HCNC,CPTII,S$GLB, | Performed by: NURSE PRACTITIONER

## 2023-04-06 PROCEDURE — 3075F PR MOST RECENT SYSTOLIC BLOOD PRESS GE 130-139MM HG: ICD-10-PCS | Mod: HCNC,CPTII,S$GLB, | Performed by: NURSE PRACTITIONER

## 2023-04-06 PROCEDURE — 3288F PR FALLS RISK ASSESSMENT DOCUMENTED: ICD-10-PCS | Mod: HCNC,CPTII,S$GLB, | Performed by: NURSE PRACTITIONER

## 2023-04-06 PROCEDURE — 3078F DIAST BP <80 MM HG: CPT | Mod: HCNC,CPTII,S$GLB, | Performed by: NURSE PRACTITIONER

## 2023-04-06 PROCEDURE — 3078F PR MOST RECENT DIASTOLIC BLOOD PRESSURE < 80 MM HG: ICD-10-PCS | Mod: HCNC,CPTII,S$GLB, | Performed by: NURSE PRACTITIONER

## 2023-04-06 PROCEDURE — 99214 PR OFFICE/OUTPT VISIT, EST, LEVL IV, 30-39 MIN: ICD-10-PCS | Mod: HCNC,S$GLB,, | Performed by: NURSE PRACTITIONER

## 2023-04-06 PROCEDURE — 1125F AMNT PAIN NOTED PAIN PRSNT: CPT | Mod: HCNC,CPTII,S$GLB, | Performed by: NURSE PRACTITIONER

## 2023-04-06 PROCEDURE — 3288F FALL RISK ASSESSMENT DOCD: CPT | Mod: HCNC,CPTII,S$GLB, | Performed by: NURSE PRACTITIONER

## 2023-04-06 PROCEDURE — 99214 OFFICE O/P EST MOD 30 MIN: CPT | Mod: HCNC,S$GLB,, | Performed by: NURSE PRACTITIONER

## 2023-04-06 PROCEDURE — 1160F PR REVIEW ALL MEDS BY PRESCRIBER/CLIN PHARMACIST DOCUMENTED: ICD-10-PCS | Mod: HCNC,CPTII,S$GLB, | Performed by: NURSE PRACTITIONER

## 2023-04-06 PROCEDURE — 1159F PR MEDICATION LIST DOCUMENTED IN MEDICAL RECORD: ICD-10-PCS | Mod: HCNC,CPTII,S$GLB, | Performed by: NURSE PRACTITIONER

## 2023-04-06 PROCEDURE — 3075F SYST BP GE 130 - 139MM HG: CPT | Mod: HCNC,CPTII,S$GLB, | Performed by: NURSE PRACTITIONER

## 2023-04-06 PROCEDURE — 99999 PR PBB SHADOW E&M-EST. PATIENT-LVL III: ICD-10-PCS | Mod: PBBFAC,HCNC,, | Performed by: NURSE PRACTITIONER

## 2023-04-06 RX ORDER — TAMSULOSIN HYDROCHLORIDE 0.4 MG/1
1 CAPSULE ORAL DAILY
Qty: 90 CAPSULE | Refills: 3 | Status: SHIPPED | OUTPATIENT
Start: 2023-04-06 | End: 2023-10-12

## 2023-04-06 NOTE — PROGRESS NOTES
"Subjective:       Patient ID: Srinath Mcknight is a 82 y.o. male.    Chief Complaint: Follow-up    Patient is an 82-year-old male presenting today following up on his diabetes, hypertension, hyperlipidemia.  He indicates he has been doing well.  His most recent A1c is 6.2 demonstrating excellent control of his sugars.  He is tolerating the medications without adverse effects.      He has a history of hypertension which is also showing good control at this time.  No signs or symptoms of cardiac decompensation.      He is had a history of hyperlipidemia and he remains on statin therapy without issues.      He is following with Hematology for chronic low-grade anemia which is felt to be due to his sickle cell trait.  It has been stable over time.    He is complaining of having runny nose daily. Sneezing at times. Hasn't tried otc meds at this point     Complaining he is always tired. Has been evaluated for sleep apnea. Was borderline and didn't tolerate cpap machine.     Also having constipation. Using duclox weekly for bowels  Reports he is drinking water, eats fruits and veggies  Doesn't move much         /70   Pulse 79   Temp 96 °F (35.6 °C)   Ht 6' 3" (1.905 m)   Wt 100.6 kg (221 lb 12.5 oz)   SpO2 99%   BMI 27.72 kg/m²     Review of Systems   Constitutional:  Negative for activity change, appetite change, chills, diaphoresis, fatigue, fever and unexpected weight change.   HENT:  Positive for rhinorrhea.    Eyes: Negative.    Respiratory:  Negative for apnea, chest tightness, shortness of breath and stridor.    Cardiovascular:  Negative for chest pain, palpitations and leg swelling.   Gastrointestinal:  Positive for constipation.   Endocrine: Negative.    Genitourinary: Negative.    Musculoskeletal:  Positive for arthralgias, joint swelling and myalgias.   Skin:  Negative for color change, pallor, rash and wound.   Allergic/Immunologic: Negative.    Neurological:  Negative for dizziness, facial " asymmetry, light-headedness and headaches.   Hematological:  Negative for adenopathy.   Psychiatric/Behavioral:  Negative for agitation and behavioral problems.      Objective:      Physical Exam  Vitals and nursing note reviewed.   Constitutional:       General: He is not in acute distress.     Appearance: He is well-developed. He is not diaphoretic.   HENT:      Head: Normocephalic and atraumatic.      Nose: Rhinorrhea present. No congestion.   Cardiovascular:      Rate and Rhythm: Normal rate and regular rhythm.      Heart sounds: Normal heart sounds.   Pulmonary:      Effort: Pulmonary effort is normal. No respiratory distress.      Breath sounds: Normal breath sounds.   Skin:     General: Skin is warm and dry.      Findings: No rash.   Neurological:      Mental Status: He is alert and oriented to person, place, and time.      Motor: Weakness (walking with cane) present.   Psychiatric:         Behavior: Behavior normal.         Thought Content: Thought content normal.         Judgment: Judgment normal.       Assessment:       1. Type 2 diabetes mellitus with diabetic polyneuropathy, without long-term current use of insulin    2. Hypertension associated with diabetes    3. Constipation, unspecified constipation type    4. Rhinitis, unspecified type    5. Fatigue, unspecified type    6. BMI 27.0-27.9,adult        Plan:       [place] was seen today for follow-up.    Diagnoses and all orders for this visit:    Type 2 diabetes mellitus with diabetic polyneuropathy, without long-term current use of insulin       - Chronic, stable on current meds. Continue   Hypertension associated with diabetes       - Chronic, stable on current meds. Continue   Constipation, unspecified constipation type      - Rec Miralax daily to help bowels. Increase water intake  Rhinitis, unspecified type       - Claritin daily, follow up if worsens   Fatigue, unspecified type       - Chronic, daily multivitamin. Good sleep hygiene discussed      BMI 27.0-27.9,adult    Other orders  -     tamsulosin (FLOMAX) 0.4 mg Cap; Take 1 capsule (0.4 mg total) by mouth once daily.      Patient Instructions   Over the counter Claritin (or generic substitutions) daily for runny nose   6 month follow up with Dr. Wilder  Follow up for worsening or no improvement in symptoms and PRN.

## 2023-04-12 NOTE — PROGRESS NOTES
Subjective:       Patient ID: Srinath Mcknight is a 79 y.o. male.    Chief Complaint: Follow-up    HPI    Patient comes in today for follow up neck pain     He fell out of bed a couple of months ago and started having HAs then neck pain   Has been seen by Dr. Polk twice     He was recently placed on steroid and robaxin for neck which did help.   He has finished steroid and stopped robaxin, neck pain still prsent, mainly on right side, feels stiff and is giving him tension headaches     He is in P.T. For his back   Health Maintenance Due   Topic Date Due    Aspirin/Antiplatelet Therapy  11/21/1960    Hemoglobin A1c  12/12/2019       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    Trouble in sleeping     Type 2 diabetes mellitus with diabetic polyneuropathy, without long-term current use of insulin     Urinary incontinence     Uses hearing aid     bilat       Current Outpatient Medications   Medication Sig Dispense Refill    ACCU-CHEK FRANKO PLUS TEST STRP Strp TEST BLOOD SUGAR EVERY  strip 0    ACCU-CHEK SOFTCLIX LANCETS Misc TEST ONE TIME DAILY 100 each 11    allopurinol (ZYLOPRIM) 100 MG tablet TAKE 1 TABLET EVERY DAY 90 tablet 3    bisacodyl (DULCOLAX) 10 mg Supp   0    blood-glucose meter (ACCU-CHEK FRANKO PLUS METER) Misc 1 Device by Misc.(Non-Drug; Combo Route) route once daily. 1 each 0    clonazePAM (KLONOPIN) 0.5 MG disintegrating tablet DISSOLVE ONE TABLET BY MOUTH EVERY NIGHT AT BEDTIME 90 tablet 0     docusate sodium (COLACE) 100 MG capsule Take 1 capsule (100 mg total) by mouth 2 (two) times daily as needed. (Patient taking differently: Take 100 mg by mouth 2 (two) times daily. Per [facility]) 60 capsule 0    ferrous sulfate 324 mg (65 mg iron) TbEC Take 1 tablet (324 mg total) by mouth once daily.      gabapentin (NEURONTIN) 300 MG capsule Take 300 mg by mouth 3 (three) times daily.      GARLIC EXTRACT ORAL Take 1 tablet by mouth once daily. Per [facility]      glipiZIDE (GLUCOTROL) 5 MG tablet Take 2 tablets (10 mg total) by mouth 2 (two) times daily with meals. 360 tablet 4    losartan (COZAAR) 50 MG tablet Take 1 tablet (50 mg total) by mouth once daily. 90 tablet 4    MELATONIN ORAL Take by mouth every evening.      metFORMIN (GLUCOPHAGE) 1000 MG tablet TAKE 1 TABLET TWICE DAILY WITH MEALS 180 tablet 3    methocarbamol (ROBAXIN) 500 MG Tab One or 2 tablets q 8 hours as needed for muscle/neck tightness/stiffness. 40 tablet 0    methylPREDNISolone (MEDROL, DEANDRE,) 4 mg tablet use as directed 1 Package 0    metoprolol succinate (TOPROL-XL) 50 MG 24 hr tablet TAKE 1 TABLET EVERY DAY 90 tablet 3    multivitamin (ONE DAILY MULTIVITAMIN) per tablet Take 1 tablet by mouth once daily.      pravastatin (PRAVACHOL) 40 MG tablet TAKE 1 TABLET EVERY DAY 90 tablet 3    tamsulosin (FLOMAX) 0.4 mg Cap Take 1 capsule (0.4 mg total) by mouth once daily. 90 capsule 3    aspirin (ECOTRIN) 81 MG EC tablet Take 1 tablet (81 mg total) by mouth once daily.  0    finasteride (PROSCAR) 5 mg tablet Take 1 tablet (5 mg total) by mouth once daily. 30 tablet 6    tadalafil (CIALIS) 5 MG tablet Take 1 tablet (5 mg total) by mouth daily as needed for Erectile Dysfunction. 30 tablet 0     No current facility-administered medications for this visit.        Review of Systems   Constitutional: Negative for fatigue, fever and unexpected weight change.   HENT: Negative for sore throat and trouble swallowing.   "  Respiratory: Negative for cough and shortness of breath.    Cardiovascular: Negative for chest pain.   Gastrointestinal: Negative for abdominal pain.   Musculoskeletal: Positive for arthralgias, back pain, neck pain and neck stiffness.   Hematological: Negative for adenopathy. Does not bruise/bleed easily.   All other systems reviewed and are negative.      Objective:   /76   Pulse 68   Temp 97.9 °F (36.6 °C) (Oral)   Ht 6' 3" (1.905 m)   Wt 104.3 kg (229 lb 15 oz)   BMI 28.74 kg/m²      Physical Exam   Constitutional: He is oriented to person, place, and time. He appears well-developed and well-nourished. No distress.   HENT:   Head: Normocephalic and atraumatic.   Eyes: Conjunctivae and EOM are normal.   Neck: Neck supple. Muscular tenderness present. No spinous process tenderness present. No neck rigidity. Decreased range of motion (worse with right rotation.) present.   Good flexion and extension.    Cardiovascular: Normal rate and regular rhythm.   Pulmonary/Chest: Effort normal.   Neurological: He is alert and oriented to person, place, and time. No cranial nerve deficit.   Skin: Skin is warm and dry.   Psychiatric: He has a normal mood and affect. His behavior is normal.         Lab Results   Component Value Date    WBC 4.15 11/19/2019    HGB 12.1 (L) 11/19/2019    HCT 37.7 (L) 11/19/2019     11/19/2019    CHOL 153 06/12/2019    TRIG 263 (H) 06/12/2019    HDL 27 (L) 06/12/2019    ALT 21 09/12/2019    AST 19 09/12/2019     11/19/2019    K 4.6 11/19/2019     11/19/2019    CREATININE 1.5 (H) 11/19/2019    BUN 16 11/19/2019    CO2 27 11/19/2019    TSH 1.362 12/23/2011    PSA 0.53 06/12/2019    INR 1.0 07/31/2019    GLUF 106 08/22/2011    HGBA1C 5.9 (H) 06/12/2019       Assessment:       1. Neck muscle spasm        Plan:   Neck muscle spasm  Comments:  add P.T. ok to cont muscle relaxer, heat if helping   Orders:  -     Ambulatory Referral to Physical/Occupational Therapy    per " patient, he cannot take NSAIDs     No follow-ups on file.       Valtrex Pregnancy And Lactation Text: this medication is Pregnancy Category B and is considered safe during pregnancy. This medication is not directly found in breast milk but it's metabolite acyclovir is present.

## 2023-04-14 ENCOUNTER — HOSPITAL ENCOUNTER (OUTPATIENT)
Facility: HOSPITAL | Age: 83
Discharge: HOME | End: 2023-04-14
Attending: ANESTHESIOLOGY | Admitting: ANESTHESIOLOGY
Payer: MEDICARE

## 2023-04-14 VITALS
BODY MASS INDEX: 27.14 KG/M2 | HEART RATE: 66 BPM | HEIGHT: 75 IN | OXYGEN SATURATION: 100 % | TEMPERATURE: 98 F | DIASTOLIC BLOOD PRESSURE: 69 MMHG | WEIGHT: 218.25 LBS | RESPIRATION RATE: 18 BRPM | SYSTOLIC BLOOD PRESSURE: 144 MMHG

## 2023-04-14 DIAGNOSIS — M17.9 KNEE OSTEOARTHRITIS: ICD-10-CM

## 2023-04-14 PROBLEM — M25.561 CHRONIC PAIN OF RIGHT KNEE: Status: ACTIVE | Noted: 2023-04-14

## 2023-04-14 PROBLEM — G89.29 CHRONIC PAIN OF RIGHT KNEE: Status: ACTIVE | Noted: 2023-04-14

## 2023-04-14 LAB — POCT GLUCOSE: 109 MG/DL (ref 70–110)

## 2023-04-14 PROCEDURE — 20610 PR DRAIN/INJECT LARGE JOINT/BURSA: ICD-10-PCS | Mod: HCNC,RT,, | Performed by: ANESTHESIOLOGY

## 2023-04-14 PROCEDURE — A9585 GADOBUTROL INJECTION: HCPCS | Mod: HCNC | Performed by: ANESTHESIOLOGY

## 2023-04-14 PROCEDURE — 82962 GLUCOSE BLOOD TEST: CPT | Mod: HCNC | Performed by: ANESTHESIOLOGY

## 2023-04-14 PROCEDURE — 77002 NEEDLE LOCALIZATION BY XRAY: CPT | Mod: HCNC | Performed by: ANESTHESIOLOGY

## 2023-04-14 PROCEDURE — 25500020 PHARM REV CODE 255: Mod: HCNC | Performed by: ANESTHESIOLOGY

## 2023-04-14 PROCEDURE — 20610 DRAIN/INJ JOINT/BURSA W/O US: CPT | Mod: HCNC,RT,, | Performed by: ANESTHESIOLOGY

## 2023-04-14 PROCEDURE — 77002 PR FLUOROSCOPIC GUIDANCE NEEDLE PLACEMENT: ICD-10-PCS | Mod: 26,HCNC,, | Performed by: ANESTHESIOLOGY

## 2023-04-14 PROCEDURE — 77002 NEEDLE LOCALIZATION BY XRAY: CPT | Mod: 26,HCNC,, | Performed by: ANESTHESIOLOGY

## 2023-04-14 PROCEDURE — 25000003 PHARM REV CODE 250: Mod: HCNC | Performed by: ANESTHESIOLOGY

## 2023-04-14 PROCEDURE — 20610 DRAIN/INJ JOINT/BURSA W/O US: CPT | Mod: HCNC,RT | Performed by: ANESTHESIOLOGY

## 2023-04-14 PROCEDURE — 63600175 PHARM REV CODE 636 W HCPCS: Mod: JZ,JG,HCNC | Performed by: ANESTHESIOLOGY

## 2023-04-14 RX ORDER — GADOBUTROL 604.72 MG/ML
INJECTION INTRAVENOUS
Status: DISCONTINUED | OUTPATIENT
Start: 2023-04-14 | End: 2023-04-14 | Stop reason: HOSPADM

## 2023-04-14 RX ORDER — INDOMETHACIN 25 MG/1
CAPSULE ORAL
Status: DISCONTINUED | OUTPATIENT
Start: 2023-04-14 | End: 2023-04-14 | Stop reason: HOSPADM

## 2023-04-14 NOTE — OP NOTE
Procedure Note: Right Knee Synvisc 1 injection under fluoroscopy    04/14/2023                                 Surgeon: Hossein Brooks MD       Assistant: None     Pre-Op Diagnosis:  right Chronic pain of right knee [M25.561, G89.29]    Post-Op Diagnosis: Chronic pain of right knee [M25.561, G89.29]     EBL: None     Complications: None     Specimens: None     Description of procedure:     After written consent was obtained, patient placed in supine position.  The area over the  lateral aspect of the right  knee(s) joint prepped with chlorhexidine.  The area was draped in the usual sterile fashion.  Approximately 5 mL 1% lidocaine was infiltrated into the skin overlying the predetermined entry point. A 22 gauge spinal needle was then advanced under fluoroscopy in the AP views into the knee joint. After negative aspiration there was injection of 1 mL radio opaque contrast dye to confirm needle location within the joint, and no evidence of intravascular spread. This was followed by injection of 6 mL of sodium hyaluronate into the joint space. Displacement of the contrast indicated that the medication went into the area of the joint space. Needle was withdrawn and a sterile band-aid applied to the skin.     Patient tolerated the procedure well, and was reporting improvement of pain symptoms after the injection. Srinath Mcknight was discharged from the clinic in stable condition.

## 2023-04-14 NOTE — DISCHARGE SUMMARY
Discharge Note  Short Stay      SUMMARY     Admit Date: 4/14/2023    Attending Physician: Hossein Brooks MD        Discharge Physician: Hossein Brooks MD        Discharge Date: 4/14/2023 7:29 AM    Procedure(s) (LRB):  right Synvisc Knee injection (Right)    Final Diagnosis: Chronic pain of right knee [M25.561, G89.29]    Disposition: Home or self care    Patient Instructions:   Current Discharge Medication List        CONTINUE these medications which have NOT CHANGED    Details   allopurinoL (ZYLOPRIM) 100 MG tablet TAKE 1 TABLET EVERY DAY  Qty: 90 tablet, Refills: 3      cyanocobalamin, vitamin B-12, 1,000 mcg Subl Place 1,000 mcg under the tongue once daily.  Qty: 90 tablet, Refills: 3    Associated Diagnoses: Nutritional anemia      doxepin (SINEQUAN) 10 MG capsule TAKE 1 CAPSULE(10 MG) BY MOUTH EVERY EVENING  Qty: 90 capsule, Refills: 0    Associated Diagnoses: Pruritus      losartan (COZAAR) 50 MG tablet Take 1 tablet (50 mg total) by mouth once daily.  Qty: 90 tablet, Refills: 3    Comments: .  Associated Diagnoses: Hypertension associated with diabetes      metFORMIN (GLUCOPHAGE) 1000 MG tablet TAKE 1 TABLET TWICE DAILY WITH MEALS  Qty: 180 tablet, Refills: 3      metoprolol succinate (TOPROL-XL) 50 MG 24 hr tablet TAKE 1 TABLET EVERY DAY  Qty: 90 tablet, Refills: 3      pramipexole (MIRAPEX) 0.125 MG tablet Take 1 tablet (0.125 mg total) by mouth every evening.  Qty: 30 tablet, Refills: 3    Associated Diagnoses: PLMD (periodic limb movement disorder)      pravastatin (PRAVACHOL) 40 MG tablet TAKE 1 TABLET EVERY DAY  Qty: 90 tablet, Refills: 2      pregabalin (LYRICA) 75 MG capsule Take 1-2 capsules ( mg total) by mouth every evening.  Qty: 60 capsule, Refills: 0      tamsulosin (FLOMAX) 0.4 mg Cap Take 1 capsule (0.4 mg total) by mouth once daily.  Qty: 90 capsule, Refills: 3      albuterol (VENTOLIN HFA) 90 mcg/actuation inhaler Inhale 2 puffs into the lungs every 6 (six) hours as needed for Wheezing  or Shortness of Breath. Rescue  Qty: 18 g, Refills: 3    Associated Diagnoses: Restrictive ventilatory defect; SOB (shortness of breath)      bisacodyl (DULCOLAX) 10 mg Supp Refills: 0      blood glucose control, low Soln by Misc.(Non-Drug; Combo Route) route.      blood sugar diagnostic (TRUE METRIX GLUCOSE TEST STRIP) Strp Use as directed to check sugars  Qty: 100 strip, Refills: 11      blood-glucose meter (TRUE METRIX AIR GLUCOSE METER) kit USE AS DIRECTED  Qty: 1 each, Refills: 0      cetirizine (ZYRTEC) 10 MG tablet Take 1 tablet (10 mg total) by mouth every evening. for 14 days  Qty: 14 tablet, Refills: 0    Associated Diagnoses: Acute conjunctivitis of right eye, unspecified acute conjunctivitis type      ciprofloxacin HCl (CILOXAN) 0.3 % ophthalmic solution INSTILL 1 DROP IN RIGHT EYE TWICE DAILY FOR 1 WEEK THEN STOP      clonazePAM (KLONOPIN) 0.5 MG tablet TAKE 1 TABLET EVERY EVENING  Qty: 30 tablet, Refills: 3    Associated Diagnoses: Controlled REM sleep behavior disorder      GARLIC EXTRACT ORAL Take 1 tablet by mouth once daily. Per [facility]      JARDIANCE 10 mg tablet       lancets Misc Use as directed to check sugars  Qty: 100 each, Refills: 11      LEXISCAN 0.4 mg/5 mL Syrg       mometasone 0.1% (ELOCON) 0.1 % cream Apply topically once daily.  Qty: 50 g, Refills: 2    Associated Diagnoses: Pruritus      moxifloxacin (VIGAMOX) 0.5 % ophthalmic solution       multivitamin (THERAGRAN) per tablet Take 1 tablet by mouth once daily.      olopatadine (PATANOL) 0.1 % ophthalmic solution Place 1 drop into the right eye 2 (two) times daily.  Qty: 5 mL, Refills: 0    Associated Diagnoses: Acute conjunctivitis of right eye, unspecified acute conjunctivitis type      papaverine 30 mg/mL injection Inject 0.3 ml of trimix solution prn ED max once daily  Prepare 10ml of trimix solution containing:    Papaverine 30mg/ml    Phentolamine 1 mg/ml    Alprostadil 10mcg/ml  Dispense 10ml per refill  Qs syringes  "1cc/30g/0.5" and alcohol swabs dispense as needed for Intracavernosal injection  Qty: 10 mL, Refills: 5    Associated Diagnoses: Prostate cancer screening; OAB (overactive bladder); Erectile dysfunction, unspecified erectile dysfunction type; Hypertension, unspecified type      PFIZER COVID BIVAL,12Y UP,,PF, 30 mcg/0.3 mL injection       prednisoLONE acetate (PRED FORTE) 1 % DrpS                  Discharge Diagnosis: Chronic pain of right knee [M25.561, G89.29]  Condition on Discharge: Stable with no complications to procedure   Diet on Discharge: Same as before.  Activity: as per instruction sheet.  Discharge to: Home with a responsible adult.  Follow up: 2-4 weeks       Please call the office at [phone] if you experience any weakness or loss of sensation, fever > 101.5, pain uncontrolled with oral medications, persistent nausea/vomiting/or diarrhea, redness or drainage from the incisions, or any other worrisome concerns. If physician on call was not reached or could not communicate with our office for any reason please go to the nearest emergency department        "

## 2023-04-14 NOTE — DISCHARGE INSTRUCTIONS

## 2023-04-18 ENCOUNTER — OFFICE VISIT (OUTPATIENT)
Dept: CARDIOLOGY | Facility: CLINIC | Age: 83
End: 2023-04-18
Payer: MEDICARE

## 2023-04-18 ENCOUNTER — HOSPITAL ENCOUNTER (OUTPATIENT)
Dept: CARDIOLOGY | Facility: HOSPITAL | Age: 83
Discharge: HOME | End: 2023-04-18
Attending: STUDENT IN AN ORGANIZED HEALTH CARE EDUCATION/TRAINING PROGRAM
Payer: MEDICARE

## 2023-04-18 VITALS
DIASTOLIC BLOOD PRESSURE: 64 MMHG | OXYGEN SATURATION: 98 % | HEART RATE: 76 BPM | WEIGHT: 216.69 LBS | HEIGHT: 75 IN | BODY MASS INDEX: 26.94 KG/M2 | SYSTOLIC BLOOD PRESSURE: 137 MMHG

## 2023-04-18 DIAGNOSIS — M47.26 OSTEOARTHRITIS OF SPINE WITH RADICULOPATHY, LUMBAR REGION: ICD-10-CM

## 2023-04-18 DIAGNOSIS — E11.51 TYPE 2 DIABETES MELLITUS WITH DIABETIC PERIPHERAL ANGIOPATHY WITHOUT GANGRENE, WITHOUT LONG-TERM CURRENT USE OF INSULIN: Primary | ICD-10-CM

## 2023-04-18 DIAGNOSIS — M47.816 LUMBAR SPONDYLOSIS: ICD-10-CM

## 2023-04-18 DIAGNOSIS — I15.2 HYPERTENSION ASSOCIATED WITH DIABETES: ICD-10-CM

## 2023-04-18 DIAGNOSIS — E11.59 HYPERTENSION ASSOCIATED WITH DIABETES: ICD-10-CM

## 2023-04-18 DIAGNOSIS — I15.2 HYPERTENSION ASSOCIATED WITH DIABETES: Primary | ICD-10-CM

## 2023-04-18 DIAGNOSIS — I70.0 ATHEROSCLEROSIS OF AORTA: ICD-10-CM

## 2023-04-18 DIAGNOSIS — I70.0 AORTIC CALCIFICATION: Chronic | ICD-10-CM

## 2023-04-18 DIAGNOSIS — E11.59 HYPERTENSION ASSOCIATED WITH DIABETES: Primary | ICD-10-CM

## 2023-04-18 DIAGNOSIS — R06.09 DYSPNEA ON EXERTION: ICD-10-CM

## 2023-04-18 DIAGNOSIS — E11.69 COMBINED HYPERLIPIDEMIA ASSOCIATED WITH TYPE 2 DIABETES MELLITUS: ICD-10-CM

## 2023-04-18 DIAGNOSIS — E78.2 COMBINED HYPERLIPIDEMIA ASSOCIATED WITH TYPE 2 DIABETES MELLITUS: ICD-10-CM

## 2023-04-18 PROCEDURE — 3078F PR MOST RECENT DIASTOLIC BLOOD PRESSURE < 80 MM HG: ICD-10-PCS | Mod: HCNC,CPTII,S$GLB, | Performed by: STUDENT IN AN ORGANIZED HEALTH CARE EDUCATION/TRAINING PROGRAM

## 2023-04-18 PROCEDURE — 93005 ELECTROCARDIOGRAM TRACING: CPT | Mod: HCNC,PO

## 2023-04-18 PROCEDURE — 99214 OFFICE O/P EST MOD 30 MIN: CPT | Mod: HCNC,S$GLB,, | Performed by: STUDENT IN AN ORGANIZED HEALTH CARE EDUCATION/TRAINING PROGRAM

## 2023-04-18 PROCEDURE — 3075F PR MOST RECENT SYSTOLIC BLOOD PRESS GE 130-139MM HG: ICD-10-PCS | Mod: HCNC,CPTII,S$GLB, | Performed by: STUDENT IN AN ORGANIZED HEALTH CARE EDUCATION/TRAINING PROGRAM

## 2023-04-18 PROCEDURE — 1159F PR MEDICATION LIST DOCUMENTED IN MEDICAL RECORD: ICD-10-PCS | Mod: HCNC,CPTII,S$GLB, | Performed by: STUDENT IN AN ORGANIZED HEALTH CARE EDUCATION/TRAINING PROGRAM

## 2023-04-18 PROCEDURE — 99214 PR OFFICE/OUTPT VISIT, EST, LEVL IV, 30-39 MIN: ICD-10-PCS | Mod: HCNC,S$GLB,, | Performed by: STUDENT IN AN ORGANIZED HEALTH CARE EDUCATION/TRAINING PROGRAM

## 2023-04-18 PROCEDURE — 1101F PR PT FALLS ASSESS DOC 0-1 FALLS W/OUT INJ PAST YR: ICD-10-PCS | Mod: HCNC,CPTII,S$GLB, | Performed by: STUDENT IN AN ORGANIZED HEALTH CARE EDUCATION/TRAINING PROGRAM

## 2023-04-18 PROCEDURE — 93010 ELECTROCARDIOGRAM REPORT: CPT | Mod: HCNC,,, | Performed by: INTERNAL MEDICINE

## 2023-04-18 PROCEDURE — 93010 EKG 12-LEAD: ICD-10-PCS | Mod: HCNC,,, | Performed by: INTERNAL MEDICINE

## 2023-04-18 PROCEDURE — 99999 PR PBB SHADOW E&M-EST. PATIENT-LVL II: CPT | Mod: PBBFAC,HCNC,, | Performed by: STUDENT IN AN ORGANIZED HEALTH CARE EDUCATION/TRAINING PROGRAM

## 2023-04-18 PROCEDURE — 3288F FALL RISK ASSESSMENT DOCD: CPT | Mod: HCNC,CPTII,S$GLB, | Performed by: STUDENT IN AN ORGANIZED HEALTH CARE EDUCATION/TRAINING PROGRAM

## 2023-04-18 PROCEDURE — 3075F SYST BP GE 130 - 139MM HG: CPT | Mod: HCNC,CPTII,S$GLB, | Performed by: STUDENT IN AN ORGANIZED HEALTH CARE EDUCATION/TRAINING PROGRAM

## 2023-04-18 PROCEDURE — 1101F PT FALLS ASSESS-DOCD LE1/YR: CPT | Mod: HCNC,CPTII,S$GLB, | Performed by: STUDENT IN AN ORGANIZED HEALTH CARE EDUCATION/TRAINING PROGRAM

## 2023-04-18 PROCEDURE — 1159F MED LIST DOCD IN RCRD: CPT | Mod: HCNC,CPTII,S$GLB, | Performed by: STUDENT IN AN ORGANIZED HEALTH CARE EDUCATION/TRAINING PROGRAM

## 2023-04-18 PROCEDURE — 99999 PR PBB SHADOW E&M-EST. PATIENT-LVL II: ICD-10-PCS | Mod: PBBFAC,HCNC,, | Performed by: STUDENT IN AN ORGANIZED HEALTH CARE EDUCATION/TRAINING PROGRAM

## 2023-04-18 PROCEDURE — 3078F DIAST BP <80 MM HG: CPT | Mod: HCNC,CPTII,S$GLB, | Performed by: STUDENT IN AN ORGANIZED HEALTH CARE EDUCATION/TRAINING PROGRAM

## 2023-04-18 PROCEDURE — 3288F PR FALLS RISK ASSESSMENT DOCUMENTED: ICD-10-PCS | Mod: HCNC,CPTII,S$GLB, | Performed by: STUDENT IN AN ORGANIZED HEALTH CARE EDUCATION/TRAINING PROGRAM

## 2023-04-18 NOTE — PROGRESS NOTES
Section of Cardiology                  Cardiac Clinic Note    Chief Complaint/Reason for consultation:  Decreased exercise tolerance      HPI:   Srinath Mcknight is a 82 y.o. male with h/o hypertension, diabetes, DJD, HLD, CAD, CKD who is here for follow-up.    11/18/20  Seen by Dr. Cobos.  Patient complained of 2-4 weeks of increased fatigue and dyspnea on exertion with moderate exertion.  Denies dyspnea at rest, orthopnea, PND, chest pain, palpitations, lower extremity edema.  For possible diastolic dysfunction, his losartan was increased to 50 mg b.i.d. and started on empagliflozin (jardiance) 10 mg daily.    9/15/21  He presents today c/o DMAON. Started about 6-7 months ago. Has been fairly stable. Says he can walk to the mailbox without SOB, but becomes SOB when he walks back. Says he can't exercise due to pain and SOB. Reports intermittent cough at times with some sputum production. Mild LE swelling. Sleeps on 1 pillow and denies orthopnea, PND. Reports occasional dizziness when he stands from a seated position.     He ambulates with a cane due to multiple surgeries on his back and hip. He is able to perform all of his ADLs. Denies syncope, palpitations, chest pain, fever, vomiting, blurry vision. Denies tobacco use, ETOH use, illicit drug use.       10/12/21  He reports feeling well. He is still getting short of breath with exertion. Does not get SOB at rest.  Denies chest pain, syncope, palpitations, PND, orthopnea. He has never smoked but has been around others who have smoked. He has been trying to exercise but gets short-winded within 5 minutes and would have to stop.         4/14/22  Still getting SOB. Has to stop to catch his breath. Able to walk a little further than before  Denies syncope, but feels that he has balancing issues. Denies tinnitus. Occurs when getting up from seated position    Does not want to wear the CPAP at night  Sees pulmonary, did not qualify for pulmonary rehab      Denies chest pain, syncope, orthopnea, LE swelling, palpitations, chest pain, fever, vomiting.         10/14/22  DAMON is improving  Reports low BP, decreased losartan to daily  Getting injections in knee and back   Doing well   Ambulates with cane, right leg weakness     Denies chest pain, syncope, orthopnea, LE swelling, palpitations      4/18/23  SOB mostly after eating, especially heavy meals  Knee injections are improving his pain  BP stable  Still has issues with balancing, uses cane regularly  Denies falls      Denies chest pain, syncope, orthopnea, LE swelling, palpitations        EKG 4/17/23 NSR, TWI inferolateral leads  EKG 4/14/22 NSR, T-wave abnormality, consider inferolateral ischemia, 74 bpm,   EKG 8/26/21  NSR, T-wave abnormality, consider inferolateral ischemia  EKG 9/15/21 NSR, T-wave abnormality, inferolateral ischemia, 78 bpm,  ms, QTc 401 ms     ECHO 10/12/21  The left ventricle is normal in size with concentric remodeling and normal systolic function.  Normal left ventricular diastolic function.  Normal right ventricular size with normal right ventricular systolic function.  The estimated ejection fraction is 55%.  Mild aortic regurgitation.       ECHO  2014  CONCLUSIONS     1 - Normal left ventricular systolic function (EF 55-60%).     2 - Normal left ventricular diastolic function.     3 - Normal right ventricular systolic function .     4 - Mild aortic regurgitation.     5 - Concentric remodeling.         STRESS TEST 10/12/21  Normal myocardial perfusion scan. There is no evidence of myocardial ischemia or infarction.    The gated perfusion images showed an ejection fraction of 47% at rest. The gated perfusion images showed an ejection fraction of 63% post stress.    The EKG portion of this study is uninterpretable due to significant baseline abnormalities    The patient reported no chest pain during the stress test.      STRESS TEST 2017  EKG Conclusions:   1. The EKG portion of this  study is negative for ischemia at a moderate workload, and peak heart rate of 80 bpm (55% of predicted).   2. Blood pressure remained stable throughout the protocol  (Presenting BP: 119/70 Peak BP: 114/49).   3. No significant arrhythmias were present.   4. There were no symptoms of chest discomfort or significant dyspnea throughout the protocol.     Impression: NORMAL MYOCARDIAL PERFUSION   1. The perfusion scan is free of evidence for myocardial ischemia or injury.   2. Resting wall motion is physiologic.   3. Resting LV function is normal.   4. The ventricular volumes are normal at rest and stress.   5. The extracardiac distribution of radioactivity is normal.         KAMALJIT 12/4/20 (Normal ABIs)  The right ankle [DT] artery has triphasic flow.   The right ankle [DP] artery has triphasic flow.   The left ankle [PT] artery has triphasic flow.  The left ankle [DP] artery has triphasic flow.    [facility]      ROS: All 10 systems reviewed. Please refer to the HPI for pertinent positives. All other systems negative.     Past Medical History  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    Trouble in sleeping     Type 2 diabetes mellitus with diabetic polyneuropathy, without long-term current use of insulin     Urinary incontinence     Uses hearing aid     bilat       Surgical History  Past Surgical History:   Procedure Laterality Date    BACK SURGERY  2019     COLONOSCOPY N/A 6/30/2020    Procedure: COLONOSCOPY;  Surgeon: Joseph Iraheta MD;  Location: [facility] ENDO;  Service: Endoscopy;  Laterality: N/A;    ESOPHAGOGASTRODUODENOSCOPY N/A 6/30/2020    Procedure: EGD (ESOPHAGOGASTRODUODENOSCOPY);  Surgeon: Joseph Iraheta MD;  Location: [facility] ENDO;  Service: Endoscopy;  Laterality: N/A;    HERNIA REPAIR Bilateral 04/18/2017    INJECTION OF JOINT Right 4/14/2023    Procedure: right Synvisc Knee injection;  Surgeon: Hossein Brooks MD;  Location: [facility] PAIN MGT;  Service: Pain Management;  Laterality: Right;    JOINT REPLACEMENT Left 10/09/2017    L YAHIR Dr. Alcocer    LAMINECTOMY  07/22/2016    left elbow  10/2017    procedure to remove gout    lumbar foraminotomy  03/2018    REVISION TOTAL HIP ARTHROPLASTY Left 9/11/2018    Procedure: REVISION, TOTAL ARTHROPLASTY, HIP;  Surgeon: Albaro Alcocer Sr., MD;  Location: [facility] OR;  Service: Orthopedics;  Laterality: Left;    ROTATOR CUFF REPAIR Left 2006    SHOULDER ARTHROSCOPY W/ ROTATOR CUFF REPAIR Right 2009          Allergies:   Review of patient's allergies indicates:   Allergen Reactions    Sulfa (sulfonamide antibiotics)      Can't recall from 1995       Social History:  Social History     Socioeconomic History    Marital status:     Number of children: 0    Highest education level: Master's degree (e.g., MA, MS, Olegario, MEd, MSW, CARMEL)   Occupational History    Occupation: retired     Comment: teacher   Tobacco Use    Smoking status: Never    Smokeless tobacco: Never   Substance and Sexual Activity    Alcohol use: Yes     Alcohol/week: 1.0 standard drink     Types: 1 Glasses of wine per week     Comment: rarely  No alcohol prior to surgery    Drug use: No    Sexual activity: Yes     Partners: Female     Birth control/protection: Condom   Social History Narrative    . No children. Retired but some part time work - 4 hours a day. 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 Caffeine intake - sugar free cola, Rare  coffee intake. Still drives. Does have a Living Will . Has a nephew Jt Gayle, lives in [place]. Has a friend Karina Rossi, locally who could help him.        Family History:  family history includes Cancer (age of onset: 50) in his brother; Diabetes in his father, mother, and sister; Drug abuse in his brother; Heart disease in his father and mother; Hypertension in his father and mother; Stroke in his father.    Home Medications:  Current Outpatient Medications on File Prior to Visit   Medication Sig Dispense Refill    albuterol (VENTOLIN HFA) 90 mcg/actuation inhaler Inhale 2 puffs into the lungs every 6 (six) hours as needed for Wheezing or Shortness of Breath. Rescue 18 g 3    allopurinoL (ZYLOPRIM) 100 MG tablet TAKE 1 TABLET EVERY DAY 90 tablet 3    bisacodyl (DULCOLAX) 10 mg Supp   0    blood glucose control, low Soln by Misc.(Non-Drug; Combo Route) route.      blood sugar diagnostic (TRUE METRIX GLUCOSE TEST STRIP) Strp Use as directed to check sugars 100 strip 11    blood-glucose meter (TRUE METRIX AIR GLUCOSE METER) kit USE AS DIRECTED 1 each 0    cetirizine (ZYRTEC) 10 MG tablet Take 1 tablet (10 mg total) by mouth every evening. for 14 days 14 tablet 0    ciprofloxacin HCl (CILOXAN) 0.3 % ophthalmic solution INSTILL 1 DROP IN RIGHT EYE TWICE DAILY FOR 1 WEEK THEN STOP      clonazePAM (KLONOPIN) 0.5 MG tablet TAKE 1 TABLET EVERY EVENING 30 tablet 3    cyanocobalamin, vitamin B-12, 1,000 mcg Subl Place 1,000 mcg under the tongue once daily. 90 tablet 3    doxepin (SINEQUAN) 10 MG capsule TAKE 1 CAPSULE(10 MG) BY MOUTH EVERY EVENING 90 capsule 0    GARLIC EXTRACT ORAL Take 1 tablet by mouth once daily. Per [facility]      JARDIANCE 10 mg tablet       lancets Misc Use as directed to check sugars 100 each 11    LEXISCAN 0.4 mg/5 mL Syrg       losartan (COZAAR) 50 MG tablet Take 1 tablet (50 mg total) by mouth once daily. 90 tablet 3    metFORMIN (GLUCOPHAGE) 1000 MG tablet TAKE 1 TABLET TWICE  "DAILY WITH MEALS 180 tablet 3    metoprolol succinate (TOPROL-XL) 50 MG 24 hr tablet TAKE 1 TABLET EVERY DAY 90 tablet 3    mometasone 0.1% (ELOCON) 0.1 % cream Apply topically once daily. 50 g 2    moxifloxacin (VIGAMOX) 0.5 % ophthalmic solution       multivitamin (THERAGRAN) per tablet Take 1 tablet by mouth once daily.      olopatadine (PATANOL) 0.1 % ophthalmic solution Place 1 drop into the right eye 2 (two) times daily. 5 mL 0    papaverine 30 mg/mL injection Inject 0.3 ml of trimix solution prn ED max once daily  Prepare 10ml of trimix solution containing:    Papaverine 30mg/ml    Phentolamine 1 mg/ml    Alprostadil 10mcg/ml  Dispense 10ml per refill  Qs syringes 1cc/30g/0.5" and alcohol swabs dispense as needed for Intracavernosal injection 10 mL 5    PFIZER COVID BIVAL,12Y UP,,PF, 30 mcg/0.3 mL injection       pramipexole (MIRAPEX) 0.125 MG tablet Take 1 tablet (0.125 mg total) by mouth every evening. 30 tablet 3    pravastatin (PRAVACHOL) 40 MG tablet TAKE 1 TABLET EVERY DAY 90 tablet 2    prednisoLONE acetate (PRED FORTE) 1 % DrpS       pregabalin (LYRICA) 75 MG capsule Take 1-2 capsules ( mg total) by mouth every evening. 60 capsule 0    tamsulosin (FLOMAX) 0.4 mg Cap Take 1 capsule (0.4 mg total) by mouth once daily. 90 capsule 3     No current facility-administered medications on file prior to visit.       Physical exam:  There were no vitals taken for this visit.      General: Pt is a 82 y.o. year old male who is AAOx3, in NAD, is pleasant, well nourished, looks stated age, walks with a cane  HEENT: PERRL, EOMI, Oral mucosa pink & moist  CVS  No abnormal cardiac pulsations noted on inspection. JVP not raised. The apical impulse is normal on palpation, and is located in the left 5th intercostal space in the mid - clavicular line. No palpable thrills or abnormal pulsations noted. RR, S1 - S2 heard, 1/6 systolic murmur at apex, rubs or gallops appreciated.   PUL : CTA B/L. No wheezes/crakles heard "   ABD : BS +, soft. No tenderness elicited   LE : No C/C/E. Distal Pulses palpable B/L         LABS:    Chemistry:   Lab Results   Component Value Date     12/06/2022    K 4.3 12/06/2022     12/06/2022    CO2 23 12/06/2022    BUN 17 12/06/2022    CREATININE 1.4 12/06/2022    CALCIUM 8.6 (L) 12/06/2022     Cardiac Markers:   Lab Results   Component Value Date    TROPONINI <0.006 03/10/2015     Cardiac Markers (Last 3):   Lab Results   Component Value Date    TROPONINI <0.006 03/10/2015     CBC:   Lab Results   Component Value Date    WBC 4.08 12/06/2022    HGB 12.7 (L) 12/06/2022    HCT 37.9 (L) 12/06/2022    MCV 88 12/06/2022     12/06/2022     Lipids:   Lab Results   Component Value Date    CHOL 128 10/07/2022    TRIG 153 (H) 10/07/2022    HDL 31 (L) 10/07/2022     Coagulation:   Lab Results   Component Value Date    INR 1.0 07/31/2019    APTT 26.2 07/31/2019           Assessment    1. Type 2 diabetes mellitus with diabetic peripheral angiopathy without gangrene, without long-term current use of insulin    2. Hypertension associated with diabetes    3. Combined hyperlipidemia associated with type 2 diabetes mellitus    4. Atherosclerosis of aorta    5. Dyspnea on exertion    6. Lumbar spondylosis    7. Osteoarthritis of spine with radiculopathy, lumbar region        Plan:    Dyspnea on exertion- stable   Possibly due to deconditioning  Echo 10/21 with normal EF, mild AI (unchanged from prior)  Pharmacologic nuclear stress test 10/21 without evidence of ischemia  F/u with pulmonology     CAD  H/o calcified aorta  Denies angina  Continue aspirin    HLD  LDL 66 as of 10/22  Continue pravastatin 40 mg daily    HTN  Stable  Continue losartan 50 mg daily, continue metoprolol 50 mg daily    Diabetes  Stable, A1c 6.2  Continue therapy     Arthritis  Chronic back and knee pain  Patient does not take pain medications  Ambulates with a cane          I have reviewed all pertinent chart information.  Plans  and recommendations have been formulated under my direct supervision. All questions answered and patient voiced understanding.   If symptoms persist go to the ED.    RTC in 6 months       Yasmine Calvillo MD  Cardiology

## 2023-04-25 DIAGNOSIS — G47.52 CONTROLLED REM SLEEP BEHAVIOR DISORDER: ICD-10-CM

## 2023-04-25 RX ORDER — CLONAZEPAM 0.5 MG/1
0.5 TABLET ORAL NIGHTLY
Qty: 30 TABLET | Refills: 3 | Status: SHIPPED | OUTPATIENT
Start: 2023-04-25 | End: 2023-08-30

## 2023-04-25 NOTE — TELEPHONE ENCOUNTER
----- Message from Mira Fields sent at 4/25/2023 12:36 PM CDT -----  Who Called: Pt    What is the request in detail: Requesting call back to discuss New rx,     clonazePAM (KLONOPIN) 0.5 MG tablet 30 tablet 3 2/10/2023  No  Sig: TAKE 1 TABLET EVERY EVENING  Sent to pharmacy as: clonazePAM (KLONOPIN) 0.5 MG tablet  Class: Normal  Order: [id number]      [id number] - [place] - [address] [facility] AT [address]  [address] [facility]  [address] [place] [address]  Phone: [phone] Fax: [phone]      Can the clinic reply by [facility]? No    Best Call Back Number: [phone]      Additional Information:

## 2023-04-26 ENCOUNTER — PATIENT MESSAGE (OUTPATIENT)
Dept: INTERNAL MEDICINE | Facility: CLINIC | Age: 83
End: 2023-04-26
Payer: MEDICARE

## 2023-05-11 ENCOUNTER — OFFICE VISIT (OUTPATIENT)
Dept: PAIN MEDICINE | Facility: CLINIC | Age: 83
End: 2023-05-11
Payer: MEDICARE

## 2023-05-11 VITALS
SYSTOLIC BLOOD PRESSURE: 127 MMHG | WEIGHT: 219.38 LBS | HEART RATE: 79 BPM | DIASTOLIC BLOOD PRESSURE: 73 MMHG | HEIGHT: 75 IN | BODY MASS INDEX: 27.28 KG/M2

## 2023-05-11 DIAGNOSIS — Z98.890 HISTORY OF LUMBAR LAMINECTOMY: ICD-10-CM

## 2023-05-11 DIAGNOSIS — M25.561 CHRONIC PAIN OF RIGHT KNEE: ICD-10-CM

## 2023-05-11 DIAGNOSIS — Z96.642 HISTORY OF LEFT HIP REPLACEMENT: ICD-10-CM

## 2023-05-11 DIAGNOSIS — G89.29 CHRONIC PAIN OF RIGHT KNEE: ICD-10-CM

## 2023-05-11 DIAGNOSIS — Z98.1 HISTORY OF LUMBAR FUSION: ICD-10-CM

## 2023-05-11 DIAGNOSIS — M25.551 RIGHT HIP PAIN: Primary | ICD-10-CM

## 2023-05-11 DIAGNOSIS — R10.31 RIGHT GROIN PAIN: ICD-10-CM

## 2023-05-11 PROCEDURE — 1159F PR MEDICATION LIST DOCUMENTED IN MEDICAL RECORD: ICD-10-PCS | Mod: CPTII,S$GLB,, | Performed by: PHYSICIAN ASSISTANT

## 2023-05-11 PROCEDURE — 1125F AMNT PAIN NOTED PAIN PRSNT: CPT | Mod: CPTII,S$GLB,, | Performed by: PHYSICIAN ASSISTANT

## 2023-05-11 PROCEDURE — 1101F PT FALLS ASSESS-DOCD LE1/YR: CPT | Mod: CPTII,S$GLB,, | Performed by: PHYSICIAN ASSISTANT

## 2023-05-11 PROCEDURE — 1160F PR REVIEW ALL MEDS BY PRESCRIBER/CLIN PHARMACIST DOCUMENTED: ICD-10-PCS | Mod: CPTII,S$GLB,, | Performed by: PHYSICIAN ASSISTANT

## 2023-05-11 PROCEDURE — 1160F RVW MEDS BY RX/DR IN RCRD: CPT | Mod: CPTII,S$GLB,, | Performed by: PHYSICIAN ASSISTANT

## 2023-05-11 PROCEDURE — 3074F PR MOST RECENT SYSTOLIC BLOOD PRESSURE < 130 MM HG: ICD-10-PCS | Mod: CPTII,S$GLB,, | Performed by: PHYSICIAN ASSISTANT

## 2023-05-11 PROCEDURE — 1101F PR PT FALLS ASSESS DOC 0-1 FALLS W/OUT INJ PAST YR: ICD-10-PCS | Mod: CPTII,S$GLB,, | Performed by: PHYSICIAN ASSISTANT

## 2023-05-11 PROCEDURE — 3078F PR MOST RECENT DIASTOLIC BLOOD PRESSURE < 80 MM HG: ICD-10-PCS | Mod: CPTII,S$GLB,, | Performed by: PHYSICIAN ASSISTANT

## 2023-05-11 PROCEDURE — 1125F PR PAIN SEVERITY QUANTIFIED, PAIN PRESENT: ICD-10-PCS | Mod: CPTII,S$GLB,, | Performed by: PHYSICIAN ASSISTANT

## 2023-05-11 PROCEDURE — 1159F MED LIST DOCD IN RCRD: CPT | Mod: CPTII,S$GLB,, | Performed by: PHYSICIAN ASSISTANT

## 2023-05-11 PROCEDURE — 3288F FALL RISK ASSESSMENT DOCD: CPT | Mod: CPTII,S$GLB,, | Performed by: PHYSICIAN ASSISTANT

## 2023-05-11 PROCEDURE — 99214 PR OFFICE/OUTPT VISIT, EST, LEVL IV, 30-39 MIN: ICD-10-PCS | Mod: S$GLB,,, | Performed by: PHYSICIAN ASSISTANT

## 2023-05-11 PROCEDURE — 99214 OFFICE O/P EST MOD 30 MIN: CPT | Mod: S$GLB,,, | Performed by: PHYSICIAN ASSISTANT

## 2023-05-11 PROCEDURE — 3074F SYST BP LT 130 MM HG: CPT | Mod: CPTII,S$GLB,, | Performed by: PHYSICIAN ASSISTANT

## 2023-05-11 PROCEDURE — 3288F PR FALLS RISK ASSESSMENT DOCUMENTED: ICD-10-PCS | Mod: CPTII,S$GLB,, | Performed by: PHYSICIAN ASSISTANT

## 2023-05-11 PROCEDURE — 3078F DIAST BP <80 MM HG: CPT | Mod: CPTII,S$GLB,, | Performed by: PHYSICIAN ASSISTANT

## 2023-05-11 PROCEDURE — 99999 PR PBB SHADOW E&M-EST. PATIENT-LVL III: CPT | Mod: PBBFAC,,, | Performed by: PHYSICIAN ASSISTANT

## 2023-05-11 PROCEDURE — 99999 PR PBB SHADOW E&M-EST. PATIENT-LVL III: ICD-10-PCS | Mod: PBBFAC,,, | Performed by: PHYSICIAN ASSISTANT

## 2023-05-11 RX ORDER — PREGABALIN 75 MG/1
75-150 CAPSULE ORAL NIGHTLY
Qty: 180 CAPSULE | Refills: 1 | Status: SHIPPED | OUTPATIENT
Start: 2023-05-11

## 2023-05-11 NOTE — PROGRESS NOTES
Established Patient Chronic Pain Note (Follow-up Visit)    Referring Physician: Yeison Wilder MD    PCP: Yeison Wilder MD    Chief Complaint:   Chief Complaint   Patient presents with    Knee Pain     right    Low-back Pain     Low-back Pain - improved    Knee Pain    RIGHT         SUBJECTIVE:    Interval History (5/11/2023): Srinath Mcknight presents today for follow-up visit.  he underwent right knee Synvisc-One injection on 4/14/23 (1 month ago).  The patient reports that he is/was better following the procedure.  he reports 50% pain relief.     The changes have continued through this visit.  Patient reports pain as 8/10 today.  His main complaint today is right groin pain.  He has never had any prior treatment of his right hip in the past.    Interval History (3/23/2023):  Srinath Mcknight presents today for follow-up visit.  Patient was last seen on 2/16/2023. At that visit, the plan was to start Lyrica, only talking QHS, which seems to be helping. Patient reports pain as 6/10 today.  His main complaint today is right knee pain.  He had a right knee steroid injection with Dr. Winters on 11/09/2022 with about 3-4 months pain relief.  This was the 1st treatment he has had for his right knee in the past.  Does not have any left knee pain.  Continues to do at home physical therapy exercises, which she feels is helping his back pain.  Overall, stable; pain is better controlled than it was at the last visit.    Initial HPI (2/16/2023):  Srinath Mcknight is a 82 y.o. male with past medical history significant for hypertension, hyperlipidemia, type 2 diabetes, coronary artery disease, urinary incontinence, sickle cell trait, stage 3 chronic kidney disease, gout, obstructive sleep apnea, history of L3-4 lumbar fusion in L4-5 lumbar laminectomy who presents to the clinic for the evaluation of lower back pain.  Of note patient reports 3 prior back surgeries:  The initial several years prior in the left lower back  at [facility], and 2 lower back surgeries with Dr. Iqbal within the last 2 years.  Today patient reports pain which is constant which is rated an 8/5.  Pain is described as aching in nature.  Patient reports pain in a bandlike distribution in the lower back without radiation into the buttocks or lower extremities.  Patient does report chronic right-sided knee pain and history of total left hip arthroplasty.  Pain is exacerbated 1st thing in the morning when arising, with lumbar extension as well as with ambulation.  Patient reports he is able to ambulate only a few steps before requiring rest.  Pain is improved with lumbar support as well as sitting.  Patient also reports receiving a lumbar epidural steroid injection at the back and spine [place] without any meaningful relief.  Patient has completed conventional physical therapy last year and continues physician directed physical therapy exercises at home without meaningful relief.  Patient has been taking gabapentin 600 mg once daily without improvement in his pain.  Patient does endorse weakness in the lower extremities associated with his pain.  Patient reports significant motor weakness.  Patient denies night fever/night sweats, urinary incontinence, bowel incontinence, significant weight loss, and loss of sensations.    Pain Disability Index Review:  Last 3 PDI Scores 2/16/2023 3/20/2018 2/8/2018   Pain Disability Index (PDI) 30 49 68       Non-Pharmacologic Treatments:  Physical Therapy/Home Exercise: yes  Ice/Heat:no  TENS: no  Acupuncture: no  Massage: no  Chiropractic: no    Other: yes; sx x 3: [facility], Dr. Iqbal x 2      Pain Medications:  - Opioids: Percocet (Oxycodone/Acetaminophen)  - Adjuvant Medications: Clonazepam (Klonopin), Neurontin (Gabapentin), and Prednisone (Deltasone)    Relevant sx:  - 3 prior back surgeries:  1st [facility], and 2 lower back surgeries with Dr. Iqbal (March 2018: L3-L4 L5  "decompression/laminotomy/micro dissection; August 2019: L3-4 revision with cage insertion and laminectomy/diskectomy)  - total left hip arthroplasty (total of 2 surgeries)    Pain Procedures:   [facility]    Dr. Brooks:  -right knee Synvisc-One injection on 4/14/23 with 50% pain relief    Other providers:  -cervical medial branch facet injections in 2020 with Dr. Linares with subsequent RFA  -02/15/2018: Left-sided sacroiliac joint injection; Dr. Roldan  -01/24/2018: Left-sided L4/5 and L5/S1 transforaminal epidural steroid injection; Dr. Roldan  -06/14/2016: Left L3-4 facet joint injection; Dr. Hutchinson  -05/18/2016: Left hip intra-articular injection posterior approach; Dr. Madera    Orthopedics:  -right knee steroid injection with Dr. Winters on 11/09/2022 with about 3-4 months pain relief    Review of Systems:  GENERAL:  No weight loss, malaise or fevers.  HEENT:   No recent changes in vision or hearing  NECK:  Negative for lumps, no difficulty with swallowing.  RESPIRATORY:  Negative for cough, wheezing or shortness of breath, patient denies any recent URI.  CARDIOVASCULAR:  Negative for chest pain or palpitations.  GI:  Negative for abdominal discomfort, blood in stools or black stools or change in bowel habits.  MUSCULOSKELETAL:  See HPI.  SKIN:  Negative for lesions, rash, and itching.  PSYCH:  No mood disorder or recent psychosocial stressors.   HEMATOLOGY/LYMPHOLOGY:  Negative for prolonged bleeding, bruising easily or swollen nodes.    NEURO:   No history of syncope, paralysis, seizures or tremors.  All other reviewed and negative other than HPI.        OBJECTIVE:    Physical Exam:  Vitals:    05/11/23 0819   BP: 127/73   Pulse: 79   Weight: 99.5 kg (219 lb 5.7 oz)   Height: 6' 3" (1.905 m)   PainSc:   8   PainLoc: Knee    Body mass index is 27.42 kg/m².   (reviewed on 5/11/2023)    GENERAL: Well appearing, in no acute distress, alert and oriented x3.  PSYCH:  Mood and affect appropriate.  SKIN: Skin " color, texture, turgor normal, no rashes or lesions.  HEAD/FACE:  Normocephalic, atraumatic.    PULM: No evidence of respiratory difficulty, symmetric chest rise.    BACK:  Positive shopping cart sign.  Well-healed 5 in lower lumbar vertical incision.  SLR is negative to radicular pain. No pain to palpation over the facet joints of the lumbar spine or spinous processes.  Reduced range of motion with pain reproduction.  EXTREMITIES:  Peripheral joint range of motion is reduced with pain with instability noted bilateral lower extremities. No deformities, edema, or skin discoloration.   MUSCULOSKELETAL: Able to stand on heels but not toes secondary to history of broken toes.     There is no pain with palpation over the sacroiliac joints bilaterally.  Gaenslen's, Distraction/Compression.  Pain with internal/external rotation of right hip.  Fabere's positive on the right.  Facet loading test is negative bilaterally.  Right Knee: pain with extension and flexion. TTP diffusely. Crepitus Present. No pain on left.    BUE & BLE strength is normal and symmetric.  No atrophy or tone abnormalities are noted.    RIGHT Lower extremity: Hip flexion 5/5, Hip Abduction 5/5, Hip Adduction 5/5, Knee extension 5/5, Knee flexion 5/5, Ankle dorsiflexion5/5, Extensor hallucis longus 5/5, Ankle plantarflexion 5/5  LEFT Lower extremity:  Hip flexion 5/5, Hip Abduction 5/5,Hip Adduction 5/5, Knee extension 5/5, Knee flexion 5/5, Ankle dorsiflexion 5/5, Extensor hallucis longus 5/5, Ankle plantarflexion 5/5  -Normal testing knee (patellar) jerk and ankle (achilles) jerk    NEURO: BUE & BLE coordination and muscle stretch reflexes are physiologic and symmetric. No loss of sensation is noted.  GAIT: normal.      Imaging (Reviewed on 5/11/2023):    05/11/21 X-ray Knee Ortho Bilateral with Flexion  COMPARISON:  December 14, 2017  FINDINGS:  Bilateral tricompartment degenerative changes.  There is increased narrowing medial compartments.  No  fracture, dislocation or effusions.  Soft tissues within normal limits.  Impression  Bilateral degenerative change without fracture or dislocation.     12/20/17 MRI Lumbar Spine W  WO Contrast    FINDINGS:  Since the previous study there has been a laminectomy at L4-L5. There is enhancement of granulation tissue at the operative site.  No abnormal enhancement surrounds the exiting nerve roots.  The thecal sac measures 9 mm AP at this level.  There is slight mass effect upon the lateral recess without definite compression of the descending L5 nerve roots. There is a synovial cyst with peripheral enhancement projecting centrally and inferiorly from the left facet joint at L3-L4 that demonstrates peripheral enhancement.  It causes mild spinal stenosis at the level of L4 and is slightly larger than on the previous study from May 2016. The conus medullaris terminates at the level ofL1-L2. No abnormal signal within the conus. Intervertebral disc levels are as follows:    T12-L1 disc: No significant disc pathology. No spinal canal or neural foraminal stenosis.    L1-L2 disc : No significant disc pathology. No spinal canal or neural foraminal stenosis.    L2-L3 disc: Normal disc height with mild degenerative facet changes and thickening of ligamentum flavum.  No significant canal or foraminal stenosis.    L3-L4 disc: Partial distal desiccation with a broad-based posterior disc bulge.  There is a far left lateral annular fissure of the disc.  Moderate to severe degenerative facet change with bilateral facet joint effusions.  The thecal sac measures 10 mm AP but is about 50% of normal cross-sectional area.  Disc material bulges into the floor of the left exit foramen and causes mild to moderate left foraminal stenosis.  There is minimal right foraminal stenosis.  Additionally, there is a synovial cyst projects centrally and inferiorly from the left facet joint.  The synovial cyst demonstrates peripheral enhancement and  measures 11 mm craniocaudal by 11 mm transverse by 5 mm AP.  It is best demonstrated on axial T2 series 6 image 23.  It causes mild thecal sac stenosis without definite neural compression.  There is a smaller synovial cyst projecting toward the left as well, remote from any neural structures.    L4-L5 disc: Level of interval laminectomy.  There is enhancing granulation tissue at the operative site without enhancement surrounding the exiting nerve roots.  The thecal sac measures 9 mm AP.  There is mild mass effect upon the lateral recesses without definite compression no descending L5 nerve roots.  Minimal bilateral foraminal stenosis.    L5-S1 disc: Partial does desiccation and disc space height loss.  Height loss is most severe toward the left where there are marginal osteophytes.  Osteophyte material encroaches into the floor of the left exit foramen causing moderate to severe left foraminal stenosis.  The right neural foramen is minimally narrowed.  Moderate degenerative facet change.  The thecal sac measures 12 mm AP.  IMPRESSION:      1.  There has been an interval laminectomy at L4-L5.  There is enhancing granulation tissue at the operative site.  There is diminished spinal stenosis at this level compared to the previous exam.    2.  Enhancement surrounds a synovial cyst that projects centrally and inferiorly from the left L3-L4 facet joint and causes mild spinal stenosis.  The cyst has increased in size compared to the previous examination.    3.  There is mild to moderate spinal stenosis at L3-L4 where the thecal sac is about 50% of normal cross-sectional area, predominantly related to hypertrophy of the posterior elements.    4.  There is a far left lateral annular fissure of L3-L4 disc.    5.  Moderate to severe left-sided foraminal stenosis at L5-S1.       11/14/22 X-Ray Lumbar Spine AP And Lateral  FINDINGS:  Prior posterior fusion of L3-L4 with interbody spacer.  Normal alignment.  No evidence to  suggest hardware failure.  Remaining lumbar vertebral bodies are normal in height and alignment.  There is mild to moderate multilevel degenerative disc disease most pronounced at L5-S1.  SI joints are intact.       11/18/19 X-Ray Cervical Spine AP And Lateral  FINDINGS:  There is visualization of C7-T1 disc level on the lateral view.  Interval progression multilevel degenerative changes throughout the C-spine.  Anterior and posterior marginal spurring with concomitant varying degrees of loss of disc height throughout the C-spine.  No acute fracture or subluxation.  C1-2 articulation appears within normal limits allowing for rotation.  Impression  Interval progression multilevel cervical spondylosis without acute fracture or subluxation.  Follow-up and/or further evaluation as warranted.        ASSESSMENT: 82 y.o. year old male with lower back pain, consistent with     1. Right hip pain  CANCELED: Ambulatory referral/consult to Orthopedics      2. Right groin pain  CANCELED: Ambulatory referral/consult to Orthopedics      3. Chronic pain of right knee        4. History of lumbar fusion        5. History of lumbar laminectomy        6. History of left hip replacement            PLAN:   - Interventions:    - S/p right knee Synvisc-One injection on 4/14/23 with 50% pain relief. Previously had right knee steroid injection with Dr. Winters on 11/09/2022 with about 3-4 months pain relief.    - Consider right hip injection. No previous tx.    - We have previously discussed considering a lumbar TF GAEL  vs caudal GAEL.  We will 1st review most recent MRI; will hold off for now since low back pain stable. Explained the risks and benefits of the procedure in detail with the patient previously in clinic along with alternative treatment options.    - Anticoagulation use: no no anticoagulation    - Medications:  - Refill Lyrica 75mg-150mg QHS for neuropathic pain - started at previous visit, which he felt was helping. X 90 day  supply. We previously discussed potential side effects of this medication which may include drowsiness,dizziness, dry mouth, constipation or peripheral edema. Can Increase to 150mg QHS if needed.     report:  Reviewed and consistent with medication use as prescribed.    - Therapy: We have previously discussed independently continuing learned exercises learned at physical therapy to help manage the patient/s painful condition. The patient was previously counseled that muscle strengthening will improve the long term prognosis in regards to pain and may also help increase range of motion and mobility.  He continues learned at home physical therapy exercises religiously, as he does not feel he got any added benefit from going to outpatient therapy.    -Imaging:   - Most recent lumbar MRI reviewed (2019).  - Consider ordering hip x-ray if patient decides to move forward with intervention.     - Records: Extensive record review completed from Dr. Iqbal: [facility] [place] -- reviewed today and added to the patient's chart.    - Refer: Consider Orthopedics follow-up for knee pain (Dr. Winters). Consider referral to Dr. Schuler for right hip pain, although patient wants to avoid sx.     - Follow up visit: 2 months follow-up     - This condition does not require this patient to take time off of work, and the primary goal of our Pain Management services is to improve the patient's functional capacity.   - I discussed the risks, benefits, and alternatives to potential treatment options. All questions and concerns were fully addressed today in clinic.         Leatha Franks PA-C  Interventional Pain Management - [facility]    Disclaimer:  This note was prepared using voice recognition system and is likely to have sound alike errors that may have been overlooked even after proof reading.  Please call me with any questions.

## 2023-05-12 ENCOUNTER — TELEPHONE (OUTPATIENT)
Dept: INTERNAL MEDICINE | Facility: CLINIC | Age: 83
End: 2023-05-12
Payer: MEDICARE

## 2023-05-12 NOTE — TELEPHONE ENCOUNTER
----- Message from Leatha Franks PA-C sent at 5/11/2023  3:32 PM CDT -----  Regarding: RE: Appointment  Please call patient to schedule. Thank you  ----- Message -----  From: Dejah Nicholas LPN  Sent: 5/11/2023   8:47 AM CDT  To: Leatha Franks PA-C  Subject: FW: Appointment                                  He can see Dr. Wilder's nurse practitioner Jose Roger today or tomorrow  ----- Message -----  From: Liyah Sultana  Sent: 5/11/2023   8:39 AM CDT  To: [facility] Staff  Subject: Appointment                                      Good morning, Srinath Mcknight seen Leatha Franks PA-C today and has some complaint with dizziness and issue with his balance. Leatha Franks PA-C was wondering if there were any sooner openings tog get him in.     Thank you,   Penny VIDES

## 2023-05-17 ENCOUNTER — OFFICE VISIT (OUTPATIENT)
Dept: INTERNAL MEDICINE | Facility: CLINIC | Age: 83
End: 2023-05-17
Payer: MEDICARE

## 2023-05-17 VITALS
TEMPERATURE: 97 F | WEIGHT: 217.38 LBS | HEART RATE: 70 BPM | OXYGEN SATURATION: 98 % | BODY MASS INDEX: 27.17 KG/M2 | SYSTOLIC BLOOD PRESSURE: 120 MMHG | DIASTOLIC BLOOD PRESSURE: 80 MMHG

## 2023-05-17 DIAGNOSIS — I95.1 ORTHOSTATIC HYPOTENSION: ICD-10-CM

## 2023-05-17 DIAGNOSIS — R42 DIZZINESS: Primary | ICD-10-CM

## 2023-05-17 DIAGNOSIS — R26.89 BALANCE PROBLEM: ICD-10-CM

## 2023-05-17 PROCEDURE — 1125F AMNT PAIN NOTED PAIN PRSNT: CPT | Mod: CPTII,,, | Performed by: NURSE PRACTITIONER

## 2023-05-17 PROCEDURE — 99214 OFFICE O/P EST MOD 30 MIN: CPT | Mod: ,,, | Performed by: NURSE PRACTITIONER

## 2023-05-17 PROCEDURE — 1101F PR PT FALLS ASSESS DOC 0-1 FALLS W/OUT INJ PAST YR: ICD-10-PCS | Mod: CPTII,,, | Performed by: NURSE PRACTITIONER

## 2023-05-17 PROCEDURE — 3079F DIAST BP 80-89 MM HG: CPT | Mod: CPTII,,, | Performed by: NURSE PRACTITIONER

## 2023-05-17 PROCEDURE — 3288F FALL RISK ASSESSMENT DOCD: CPT | Mod: CPTII,,, | Performed by: NURSE PRACTITIONER

## 2023-05-17 PROCEDURE — 3074F PR MOST RECENT SYSTOLIC BLOOD PRESSURE < 130 MM HG: ICD-10-PCS | Mod: CPTII,,, | Performed by: NURSE PRACTITIONER

## 2023-05-17 PROCEDURE — 93010 EKG 12-LEAD: ICD-10-PCS | Mod: ,,, | Performed by: INTERNAL MEDICINE

## 2023-05-17 PROCEDURE — 1160F PR REVIEW ALL MEDS BY PRESCRIBER/CLIN PHARMACIST DOCUMENTED: ICD-10-PCS | Mod: CPTII,,, | Performed by: NURSE PRACTITIONER

## 2023-05-17 PROCEDURE — 1125F PR PAIN SEVERITY QUANTIFIED, PAIN PRESENT: ICD-10-PCS | Mod: CPTII,,, | Performed by: NURSE PRACTITIONER

## 2023-05-17 PROCEDURE — 3074F SYST BP LT 130 MM HG: CPT | Mod: CPTII,,, | Performed by: NURSE PRACTITIONER

## 2023-05-17 PROCEDURE — 3079F PR MOST RECENT DIASTOLIC BLOOD PRESSURE 80-89 MM HG: ICD-10-PCS | Mod: CPTII,,, | Performed by: NURSE PRACTITIONER

## 2023-05-17 PROCEDURE — 99999 PR PBB SHADOW E&M-EST. PATIENT-LVL III: ICD-10-PCS | Mod: PBBFAC,,, | Performed by: NURSE PRACTITIONER

## 2023-05-17 PROCEDURE — 93010 ELECTROCARDIOGRAM REPORT: CPT | Mod: ,,, | Performed by: INTERNAL MEDICINE

## 2023-05-17 PROCEDURE — 93005 ELECTROCARDIOGRAM TRACING: CPT | Mod: ,,, | Performed by: NURSE PRACTITIONER

## 2023-05-17 PROCEDURE — 1160F RVW MEDS BY RX/DR IN RCRD: CPT | Mod: CPTII,,, | Performed by: NURSE PRACTITIONER

## 2023-05-17 PROCEDURE — 1159F PR MEDICATION LIST DOCUMENTED IN MEDICAL RECORD: ICD-10-PCS | Mod: CPTII,,, | Performed by: NURSE PRACTITIONER

## 2023-05-17 PROCEDURE — 93005 EKG 12-LEAD: ICD-10-PCS | Mod: ,,, | Performed by: NURSE PRACTITIONER

## 2023-05-17 PROCEDURE — 99214 PR OFFICE/OUTPT VISIT, EST, LEVL IV, 30-39 MIN: ICD-10-PCS | Mod: ,,, | Performed by: NURSE PRACTITIONER

## 2023-05-17 PROCEDURE — 99213 OFFICE O/P EST LOW 20 MIN: CPT | Mod: PO | Performed by: NURSE PRACTITIONER

## 2023-05-17 PROCEDURE — 1159F MED LIST DOCD IN RCRD: CPT | Mod: CPTII,,, | Performed by: NURSE PRACTITIONER

## 2023-05-17 PROCEDURE — 1101F PT FALLS ASSESS-DOCD LE1/YR: CPT | Mod: CPTII,,, | Performed by: NURSE PRACTITIONER

## 2023-05-17 PROCEDURE — 3288F PR FALLS RISK ASSESSMENT DOCUMENTED: ICD-10-PCS | Mod: CPTII,,, | Performed by: NURSE PRACTITIONER

## 2023-05-17 PROCEDURE — 99999 PR PBB SHADOW E&M-EST. PATIENT-LVL III: CPT | Mod: PBBFAC,,, | Performed by: NURSE PRACTITIONER

## 2023-05-17 NOTE — PATIENT INSTRUCTIONS
Decrease losartan to 25 mg  Check BP daily 2 hrs after taking meds  Increase water intake to 64 oz/day  Follow up with Cardiology

## 2023-05-17 NOTE — PROGRESS NOTES
"Subjective:       Patient ID: Srinath Mcknight is a 82 y.o. male.    Chief Complaint: Dizziness    Pt presents to clinic today for dizziness  Reports it has been going on for quite some time  Some days are better than others  Any position change he feels dizzy, weak  Having more fatigue than normal- falls asleep sitting in his chair  Has little energy  Feels like his gait is off- walks with cane for balance   Denies chest pain  Does admit to some shortness of breath but reports chronic for "years"      /80   Pulse 70   Temp 97.2 °F (36.2 °C)   Wt 98.6 kg (217 lb 6 oz)   SpO2 98%   BMI 27.17 kg/m²     Review of Systems   Constitutional:  Positive for activity change and fatigue. Negative for appetite change, chills, diaphoresis, fever and unexpected weight change.   HENT: Negative.     Eyes: Negative.    Respiratory:  Negative for apnea, chest tightness, shortness of breath and stridor.    Cardiovascular:  Negative for chest pain, palpitations and leg swelling.   Gastrointestinal: Negative.    Endocrine: Negative.    Genitourinary: Negative.    Musculoskeletal:  Negative for arthralgias and myalgias.   Skin:  Negative for color change, pallor, rash and wound.   Allergic/Immunologic: Negative.    Neurological:  Positive for dizziness, weakness and light-headedness. Negative for facial asymmetry and headaches.   Hematological:  Negative for adenopathy.   Psychiatric/Behavioral:  Negative for agitation and behavioral problems.      Objective:      Physical Exam  Vitals and nursing note reviewed.   Constitutional:       General: He is not in acute distress.     Appearance: He is well-developed. He is not diaphoretic.   HENT:      Head: Normocephalic and atraumatic.      Right Ear: Tympanic membrane normal.      Left Ear: Tympanic membrane normal.      Nose: Nose normal.   Cardiovascular:      Rate and Rhythm: Normal rate and regular rhythm.      Heart sounds: Normal heart sounds.   Pulmonary:      Effort: " Pulmonary effort is normal. No respiratory distress.      Breath sounds: Normal breath sounds.   Abdominal:      General: Bowel sounds are normal. There is no distension.      Tenderness: There is no abdominal tenderness.   Skin:     General: Skin is warm and dry.      Findings: No rash.   Neurological:      Mental Status: He is alert and oriented to person, place, and time.      Motor: Weakness (walking with cane) present.   Psychiatric:         Behavior: Behavior normal.         Thought Content: Thought content normal.         Judgment: Judgment normal.       Assessment:       1. Dizziness    2. Orthostatic hypotension    3. Balance problem    4. BMI 27.0-27.9,adult        Plan:       [place] was seen today for dizziness.    Diagnoses and all orders for this visit:    Dizziness  -     IN OFFICE EKG 12-LEAD (to Muse)  -     Orthostatic vital signs    Orthostatic hypotension       - Pt is orthostatic in office today. Will decrease losartan and have pt follow up early next week for recheck   Balance problem       - We discussed PT but pt wants to get the dizziness under control prior to that   BMI 27.0-27.9,adult      Decrease losartan to 25 mg  Check BP daily 2 hrs after taking meds  Increase water intake to 64 oz/day  Follow up with Cardiology asap  Follow up Monday of next week for orthostatic vitals, consider labs at that time as pt does not want to do them today.   Follow up for worsening or no improvement in symptoms and PRN.

## 2023-05-19 ENCOUNTER — TELEPHONE (OUTPATIENT)
Dept: INTERNAL MEDICINE | Facility: CLINIC | Age: 83
End: 2023-05-19
Payer: MEDICARE

## 2023-05-19 NOTE — TELEPHONE ENCOUNTER
----- Message from Jose Roger NP sent at 5/18/2023  2:58 PM CDT -----  Can you please call pt and let him know I spoke with Dr. Wilder about his dizziness. Dr. Wilder agreed with the change in BP meds. We would like to see him back early next week to recheck his orthostatic vitals. Please schedule this. If he is still having issues with dizziness at the follow up, Dr. Wilder recommends we do labs and get him back in with cardio at that point. I told pt we would call with an update.    Jose

## 2023-05-23 ENCOUNTER — OFFICE VISIT (OUTPATIENT)
Dept: INTERNAL MEDICINE | Facility: CLINIC | Age: 83
End: 2023-05-23
Payer: MEDICARE

## 2023-05-23 ENCOUNTER — LAB VISIT (OUTPATIENT)
Dept: LAB | Facility: HOSPITAL | Age: 83
End: 2023-05-23
Attending: NURSE PRACTITIONER
Payer: MEDICARE

## 2023-05-23 ENCOUNTER — OFFICE VISIT (OUTPATIENT)
Dept: CARDIOLOGY | Facility: CLINIC | Age: 83
End: 2023-05-23
Payer: MEDICARE

## 2023-05-23 VITALS
DIASTOLIC BLOOD PRESSURE: 74 MMHG | TEMPERATURE: 97 F | SYSTOLIC BLOOD PRESSURE: 156 MMHG | WEIGHT: 221.31 LBS | BODY MASS INDEX: 27.67 KG/M2

## 2023-05-23 VITALS
SYSTOLIC BLOOD PRESSURE: 140 MMHG | BODY MASS INDEX: 27.47 KG/M2 | HEART RATE: 75 BPM | WEIGHT: 219.81 LBS | OXYGEN SATURATION: 98 % | DIASTOLIC BLOOD PRESSURE: 70 MMHG

## 2023-05-23 DIAGNOSIS — R26.89 BALANCE PROBLEM: ICD-10-CM

## 2023-05-23 DIAGNOSIS — I70.0 AORTIC CALCIFICATION: Chronic | ICD-10-CM

## 2023-05-23 DIAGNOSIS — R53.83 FATIGUE, UNSPECIFIED TYPE: ICD-10-CM

## 2023-05-23 DIAGNOSIS — M47.26 OSTEOARTHRITIS OF SPINE WITH RADICULOPATHY, LUMBAR REGION: Chronic | ICD-10-CM

## 2023-05-23 DIAGNOSIS — H10.31 ACUTE CONJUNCTIVITIS OF RIGHT EYE, UNSPECIFIED ACUTE CONJUNCTIVITIS TYPE: ICD-10-CM

## 2023-05-23 DIAGNOSIS — E78.2 COMBINED HYPERLIPIDEMIA ASSOCIATED WITH TYPE 2 DIABETES MELLITUS: Chronic | ICD-10-CM

## 2023-05-23 DIAGNOSIS — E11.59 HYPERTENSION ASSOCIATED WITH DIABETES: Primary | ICD-10-CM

## 2023-05-23 DIAGNOSIS — I15.2 HYPERTENSION ASSOCIATED WITH DIABETES: Primary | ICD-10-CM

## 2023-05-23 DIAGNOSIS — R55 SYNCOPE, UNSPECIFIED SYNCOPE TYPE: ICD-10-CM

## 2023-05-23 DIAGNOSIS — E11.69 COMBINED HYPERLIPIDEMIA ASSOCIATED WITH TYPE 2 DIABETES MELLITUS: Chronic | ICD-10-CM

## 2023-05-23 DIAGNOSIS — E11.42 TYPE 2 DIABETES MELLITUS WITH DIABETIC POLYNEUROPATHY, WITHOUT LONG-TERM CURRENT USE OF INSULIN: Chronic | ICD-10-CM

## 2023-05-23 DIAGNOSIS — I95.1 ORTHOSTATIC HYPOTENSION: Primary | ICD-10-CM

## 2023-05-23 DIAGNOSIS — R42 DIZZINESS: ICD-10-CM

## 2023-05-23 LAB
BASOPHILS # BLD AUTO: 0.05 K/UL (ref 0–0.2)
BASOPHILS NFR BLD: 1.1 % (ref 0–1.9)
DIFFERENTIAL METHOD: ABNORMAL
EOSINOPHIL # BLD AUTO: 0.2 K/UL (ref 0–0.5)
EOSINOPHIL NFR BLD: 4.7 % (ref 0–8)
ERYTHROCYTE [DISTWIDTH] IN BLOOD BY AUTOMATED COUNT: 14.2 % (ref 11.5–14.5)
HCT VFR BLD AUTO: 38.8 % (ref 40–54)
HGB BLD-MCNC: 12.4 G/DL (ref 14–18)
IMM GRANULOCYTES # BLD AUTO: 0.01 K/UL (ref 0–0.04)
IMM GRANULOCYTES NFR BLD AUTO: 0.2 % (ref 0–0.5)
LYMPHOCYTES # BLD AUTO: 1.7 K/UL (ref 1–4.8)
LYMPHOCYTES NFR BLD: 35.8 % (ref 18–48)
MCH RBC QN AUTO: 28.9 PG (ref 27–31)
MCHC RBC AUTO-ENTMCNC: 32 G/DL (ref 32–36)
MCV RBC AUTO: 90 FL (ref 82–98)
MONOCYTES # BLD AUTO: 0.5 K/UL (ref 0.3–1)
MONOCYTES NFR BLD: 10.1 % (ref 4–15)
NEUTROPHILS # BLD AUTO: 2.2 K/UL (ref 1.8–7.7)
NEUTROPHILS NFR BLD: 48.1 % (ref 38–73)
NRBC BLD-RTO: 0 /100 WBC
PLATELET # BLD AUTO: 195 K/UL (ref 150–450)
PMV BLD AUTO: 10.8 FL (ref 9.2–12.9)
RBC # BLD AUTO: 4.29 M/UL (ref 4.6–6.2)
WBC # BLD AUTO: 4.66 K/UL (ref 3.9–12.7)

## 2023-05-23 PROCEDURE — 3077F PR MOST RECENT SYSTOLIC BLOOD PRESSURE >= 140 MM HG: ICD-10-PCS | Mod: CPTII,S$GLB,, | Performed by: STUDENT IN AN ORGANIZED HEALTH CARE EDUCATION/TRAINING PROGRAM

## 2023-05-23 PROCEDURE — 3288F PR FALLS RISK ASSESSMENT DOCUMENTED: ICD-10-PCS | Mod: CPTII,S$GLB,, | Performed by: NURSE PRACTITIONER

## 2023-05-23 PROCEDURE — 1159F PR MEDICATION LIST DOCUMENTED IN MEDICAL RECORD: ICD-10-PCS | Mod: CPTII,S$GLB,, | Performed by: NURSE PRACTITIONER

## 2023-05-23 PROCEDURE — 3078F PR MOST RECENT DIASTOLIC BLOOD PRESSURE < 80 MM HG: ICD-10-PCS | Mod: CPTII,S$GLB,, | Performed by: NURSE PRACTITIONER

## 2023-05-23 PROCEDURE — 1101F PT FALLS ASSESS-DOCD LE1/YR: CPT | Mod: CPTII,S$GLB,, | Performed by: STUDENT IN AN ORGANIZED HEALTH CARE EDUCATION/TRAINING PROGRAM

## 2023-05-23 PROCEDURE — 99999 PR PBB SHADOW E&M-EST. PATIENT-LVL III: ICD-10-PCS | Mod: PBBFAC,,, | Performed by: NURSE PRACTITIONER

## 2023-05-23 PROCEDURE — 1101F PR PT FALLS ASSESS DOC 0-1 FALLS W/OUT INJ PAST YR: ICD-10-PCS | Mod: CPTII,S$GLB,, | Performed by: STUDENT IN AN ORGANIZED HEALTH CARE EDUCATION/TRAINING PROGRAM

## 2023-05-23 PROCEDURE — 1125F AMNT PAIN NOTED PAIN PRSNT: CPT | Mod: CPTII,S$GLB,, | Performed by: NURSE PRACTITIONER

## 2023-05-23 PROCEDURE — 36415 COLL VENOUS BLD VENIPUNCTURE: CPT | Mod: PO | Performed by: NURSE PRACTITIONER

## 2023-05-23 PROCEDURE — 99214 PR OFFICE/OUTPT VISIT, EST, LEVL IV, 30-39 MIN: ICD-10-PCS | Mod: S$GLB,,, | Performed by: STUDENT IN AN ORGANIZED HEALTH CARE EDUCATION/TRAINING PROGRAM

## 2023-05-23 PROCEDURE — 3078F PR MOST RECENT DIASTOLIC BLOOD PRESSURE < 80 MM HG: ICD-10-PCS | Mod: CPTII,S$GLB,, | Performed by: STUDENT IN AN ORGANIZED HEALTH CARE EDUCATION/TRAINING PROGRAM

## 2023-05-23 PROCEDURE — 3077F SYST BP >= 140 MM HG: CPT | Mod: CPTII,S$GLB,, | Performed by: NURSE PRACTITIONER

## 2023-05-23 PROCEDURE — 1160F PR REVIEW ALL MEDS BY PRESCRIBER/CLIN PHARMACIST DOCUMENTED: ICD-10-PCS | Mod: CPTII,S$GLB,, | Performed by: NURSE PRACTITIONER

## 2023-05-23 PROCEDURE — 3078F DIAST BP <80 MM HG: CPT | Mod: CPTII,S$GLB,, | Performed by: NURSE PRACTITIONER

## 2023-05-23 PROCEDURE — 99999 PR PBB SHADOW E&M-EST. PATIENT-LVL II: ICD-10-PCS | Mod: PBBFAC,,, | Performed by: STUDENT IN AN ORGANIZED HEALTH CARE EDUCATION/TRAINING PROGRAM

## 2023-05-23 PROCEDURE — 99214 OFFICE O/P EST MOD 30 MIN: CPT | Mod: S$GLB,,, | Performed by: NURSE PRACTITIONER

## 2023-05-23 PROCEDURE — 1159F MED LIST DOCD IN RCRD: CPT | Mod: CPTII,S$GLB,, | Performed by: STUDENT IN AN ORGANIZED HEALTH CARE EDUCATION/TRAINING PROGRAM

## 2023-05-23 PROCEDURE — 80048 BASIC METABOLIC PNL TOTAL CA: CPT | Performed by: NURSE PRACTITIONER

## 2023-05-23 PROCEDURE — 3078F DIAST BP <80 MM HG: CPT | Mod: CPTII,S$GLB,, | Performed by: STUDENT IN AN ORGANIZED HEALTH CARE EDUCATION/TRAINING PROGRAM

## 2023-05-23 PROCEDURE — 3288F FALL RISK ASSESSMENT DOCD: CPT | Mod: CPTII,S$GLB,, | Performed by: NURSE PRACTITIONER

## 2023-05-23 PROCEDURE — 84443 ASSAY THYROID STIM HORMONE: CPT | Performed by: NURSE PRACTITIONER

## 2023-05-23 PROCEDURE — 99214 OFFICE O/P EST MOD 30 MIN: CPT | Mod: S$GLB,,, | Performed by: STUDENT IN AN ORGANIZED HEALTH CARE EDUCATION/TRAINING PROGRAM

## 2023-05-23 PROCEDURE — 1101F PR PT FALLS ASSESS DOC 0-1 FALLS W/OUT INJ PAST YR: ICD-10-PCS | Mod: CPTII,S$GLB,, | Performed by: NURSE PRACTITIONER

## 2023-05-23 PROCEDURE — 3077F SYST BP >= 140 MM HG: CPT | Mod: CPTII,S$GLB,, | Performed by: STUDENT IN AN ORGANIZED HEALTH CARE EDUCATION/TRAINING PROGRAM

## 2023-05-23 PROCEDURE — 1125F PR PAIN SEVERITY QUANTIFIED, PAIN PRESENT: ICD-10-PCS | Mod: CPTII,S$GLB,, | Performed by: NURSE PRACTITIONER

## 2023-05-23 PROCEDURE — 85025 COMPLETE CBC W/AUTO DIFF WBC: CPT | Performed by: NURSE PRACTITIONER

## 2023-05-23 PROCEDURE — 3288F FALL RISK ASSESSMENT DOCD: CPT | Mod: CPTII,S$GLB,, | Performed by: STUDENT IN AN ORGANIZED HEALTH CARE EDUCATION/TRAINING PROGRAM

## 2023-05-23 PROCEDURE — 99214 PR OFFICE/OUTPT VISIT, EST, LEVL IV, 30-39 MIN: ICD-10-PCS | Mod: S$GLB,,, | Performed by: NURSE PRACTITIONER

## 2023-05-23 PROCEDURE — 99999 PR PBB SHADOW E&M-EST. PATIENT-LVL III: CPT | Mod: PBBFAC,,, | Performed by: NURSE PRACTITIONER

## 2023-05-23 PROCEDURE — 1160F RVW MEDS BY RX/DR IN RCRD: CPT | Mod: CPTII,S$GLB,, | Performed by: NURSE PRACTITIONER

## 2023-05-23 PROCEDURE — 3288F PR FALLS RISK ASSESSMENT DOCUMENTED: ICD-10-PCS | Mod: CPTII,S$GLB,, | Performed by: STUDENT IN AN ORGANIZED HEALTH CARE EDUCATION/TRAINING PROGRAM

## 2023-05-23 PROCEDURE — 1101F PT FALLS ASSESS-DOCD LE1/YR: CPT | Mod: CPTII,S$GLB,, | Performed by: NURSE PRACTITIONER

## 2023-05-23 PROCEDURE — 1159F MED LIST DOCD IN RCRD: CPT | Mod: CPTII,S$GLB,, | Performed by: NURSE PRACTITIONER

## 2023-05-23 PROCEDURE — 99999 PR PBB SHADOW E&M-EST. PATIENT-LVL II: CPT | Mod: PBBFAC,,, | Performed by: STUDENT IN AN ORGANIZED HEALTH CARE EDUCATION/TRAINING PROGRAM

## 2023-05-23 PROCEDURE — 3077F PR MOST RECENT SYSTOLIC BLOOD PRESSURE >= 140 MM HG: ICD-10-PCS | Mod: CPTII,S$GLB,, | Performed by: NURSE PRACTITIONER

## 2023-05-23 PROCEDURE — 1159F PR MEDICATION LIST DOCUMENTED IN MEDICAL RECORD: ICD-10-PCS | Mod: CPTII,S$GLB,, | Performed by: STUDENT IN AN ORGANIZED HEALTH CARE EDUCATION/TRAINING PROGRAM

## 2023-05-23 NOTE — PROGRESS NOTES
"Subjective:       Patient ID: Srinath Mcknight is a 82 y.o. male.    Chief Complaint: Follow-up    Pt presents to clinic today for dizziness follow up  Seen last week for dizziness   He was found to have some orthostatic hypotension  We decreased his losartan to 25 mg and encouraged him to increase his fluid intake  He returned today for recheck  Still found to be orthostatic  Still complaining of fatigue, "falling asleep while just sitting there" and dizzy  He has increased his water to 3-4 bottles/day  Feels off balance at all times  When he stands up, he has increased dizziness  Relies on his cane     He is also having some right eye drainage and redness  Seen his eye MD who prescribed some drops  Feels the drops has helped some      BP (!) 156/74 (BP Location: Right arm, Patient Position: Lying)   Temp 96.8 °F (36 °C)   Wt 100.4 kg (221 lb 5.5 oz)   BMI 27.67 kg/m²     Review of Systems   Constitutional:  Positive for fatigue. Negative for activity change, appetite change, chills, diaphoresis, fever and unexpected weight change.   HENT: Negative.     Eyes:  Positive for discharge and redness.   Respiratory:  Negative for apnea, chest tightness, shortness of breath and stridor.    Cardiovascular:  Negative for chest pain, palpitations and leg swelling.   Gastrointestinal: Negative.    Endocrine: Negative.    Genitourinary: Negative.    Musculoskeletal:  Positive for arthralgias, back pain and myalgias.   Skin:  Negative for color change, pallor, rash and wound.   Allergic/Immunologic: Negative.    Neurological:  Positive for dizziness, light-headedness and headaches. Negative for facial asymmetry.   Hematological:  Negative for adenopathy.   Psychiatric/Behavioral:  Negative for agitation and behavioral problems.      Objective:      Physical Exam  Vitals and nursing note reviewed.   Constitutional:       General: He is not in acute distress.     Appearance: He is well-developed. He is not diaphoretic.   HENT: "      Head: Normocephalic and atraumatic.   Eyes:      General:         Right eye: Discharge (clear) present.      Conjunctiva/sclera:      Right eye: Right conjunctiva is injected.   Cardiovascular:      Rate and Rhythm: Normal rate and regular rhythm.      Heart sounds: Normal heart sounds.   Pulmonary:      Effort: Pulmonary effort is normal. No respiratory distress.      Breath sounds: Normal breath sounds.   Skin:     General: Skin is warm and dry.      Findings: No rash.   Neurological:      Mental Status: He is alert and oriented to person, place, and time.      Motor: Weakness (walks with cane) present.   Psychiatric:         Mood and Affect: Affect is flat.         Behavior: Behavior is slowed.         Thought Content: Thought content normal.         Judgment: Judgment normal.       Assessment:       1. Orthostatic hypotension    2. Dizziness    3. Balance problem    4. Fatigue, unspecified type    5. Syncope, unspecified syncope type    6. Acute conjunctivitis of right eye, unspecified acute conjunctivitis type    7. BMI 27.0-27.9,adult        Plan:       [place] was seen today for follow-up.    Diagnoses and all orders for this visit:    Orthostatic hypotension  -     Orthostatic vital signs    Dizziness  -     CBC Auto Differential; Future  -     Basic Metabolic Panel; Future  -     US Carotid Bilateral; Future    Balance problem    Fatigue, unspecified type  -     TSH; Future    Syncope, unspecified syncope type  -     US Carotid Bilateral; Future    Acute conjunctivitis of right eye, unspecified acute conjunctivitis type       - Continue eye drops per eye doctor as eye seems to be improved.    BMI 27.0-27.9,adult      Pt is still symptomatic on exam even with the decrease of his losartan and increase in fluids  We will get labs, carotid ultrasound and have pt follow up with Cardiology  We discussed his extensive med list, and the side effects of some of the medication he is taking such as the lyrica  and clonazepam  We will rule out other causes at this point due to the meds helping with his chronic back pain and RLS  We discussed PT to help with balance problem but pt wishes to do workup first  Will have pt follow up with Dr. Wilder in 2 weeks   Follow up for worsening or no improvement in symptoms and PRN.

## 2023-05-23 NOTE — PROGRESS NOTES
Section of Cardiology                  Cardiac Clinic Note    Chief Complaint/Reason for consultation:  Decreased exercise tolerance      HPI:   Srinath Mcknight is a 82 y.o. male with h/o hypertension, diabetes, DJD, HLD, CAD, CKD who is here for follow-up.    11/18/20  Seen by Dr. Cobos.  Patient complained of 2-4 weeks of increased fatigue and dyspnea on exertion with moderate exertion.  Denies dyspnea at rest, orthopnea, PND, chest pain, palpitations, lower extremity edema.  For possible diastolic dysfunction, his losartan was increased to 50 mg b.i.d. and started on empagliflozin (jardiance) 10 mg daily.    9/15/21  He presents today c/o DAMON. Started about 6-7 months ago. Has been fairly stable. Says he can walk to the mailbox without SOB, but becomes SOB when he walks back. Says he can't exercise due to pain and SOB. Reports intermittent cough at times with some sputum production. Mild LE swelling. Sleeps on 1 pillow and denies orthopnea, PND. Reports occasional dizziness when he stands from a seated position.     He ambulates with a cane due to multiple surgeries on his back and hip. He is able to perform all of his ADLs. Denies syncope, palpitations, chest pain, fever, vomiting, blurry vision. Denies tobacco use, ETOH use, illicit drug use.       10/12/21  He reports feeling well. He is still getting short of breath with exertion. Does not get SOB at rest.  Denies chest pain, syncope, palpitations, PND, orthopnea. He has never smoked but has been around others who have smoked. He has been trying to exercise but gets short-winded within 5 minutes and would have to stop.         4/14/22  Still getting SOB. Has to stop to catch his breath. Able to walk a little further than before  Denies syncope, but feels that he has balancing issues. Denies tinnitus. Occurs when getting up from seated position    Does not want to wear the CPAP at night  Sees pulmonary, did not qualify for pulmonary rehab      Denies chest pain, syncope, orthopnea, LE swelling, palpitations, chest pain, fever, vomiting.         10/14/22  DAMON is improving  Reports low BP, decreased losartan to daily  Getting injections in knee and back   Doing well   Ambulates with cane, right leg weakness     Denies chest pain, syncope, orthopnea, LE swelling, palpitations      4/18/23  SOB mostly after eating, especially heavy meals  Knee injections are improving his pain  BP stable  Still has issues with balancing, uses cane regularly  Denies falls      Denies chest pain, syncope, orthopnea, LE swelling, palpitations        5/23/23  Comes in with wife  Dizziness with standing- ongoing for some time, thinks getting worse  Orthostatics in clinic normal  Uses a cane for this reason   No ear ringing or pain  BP elevated here and home  Losartan was 1/2'd recently- can't tell if symptoms improved  Has not fallen         EKG 4/17/23 NSR, TWI inferolateral leads  EKG 4/14/22 NSR, T-wave abnormality, consider inferolateral ischemia, 74 bpm,   EKG 8/26/21  NSR, T-wave abnormality, consider inferolateral ischemia  EKG 9/15/21 NSR, T-wave abnormality, inferolateral ischemia, 78 bpm,  ms, QTc 401 ms     ECHO 10/12/21  The left ventricle is normal in size with concentric remodeling and normal systolic function.  Normal left ventricular diastolic function.  Normal right ventricular size with normal right ventricular systolic function.  The estimated ejection fraction is 55%.  Mild aortic regurgitation.       ECHO  2014  CONCLUSIONS     1 - Normal left ventricular systolic function (EF 55-60%).     2 - Normal left ventricular diastolic function.     3 - Normal right ventricular systolic function .     4 - Mild aortic regurgitation.     5 - Concentric remodeling.         STRESS TEST 10/12/21  Normal myocardial perfusion scan. There is no evidence of myocardial ischemia or infarction.    The gated perfusion images showed an ejection fraction of 47% at rest. The  gated perfusion images showed an ejection fraction of 63% post stress.    The EKG portion of this study is uninterpretable due to significant baseline abnormalities    The patient reported no chest pain during the stress test.      STRESS TEST 2017  EKG Conclusions:   1. The EKG portion of this study is negative for ischemia at a moderate workload, and peak heart rate of 80 bpm (55% of predicted).   2. Blood pressure remained stable throughout the protocol  (Presenting BP: 119/70 Peak BP: 114/49).   3. No significant arrhythmias were present.   4. There were no symptoms of chest discomfort or significant dyspnea throughout the protocol.     Impression: NORMAL MYOCARDIAL PERFUSION   1. The perfusion scan is free of evidence for myocardial ischemia or injury.   2. Resting wall motion is physiologic.   3. Resting LV function is normal.   4. The ventricular volumes are normal at rest and stress.   5. The extracardiac distribution of radioactivity is normal.         KAMALJIT 12/4/20 (Normal ABIs)  The right ankle [DT] artery has triphasic flow.   The right ankle [DP] artery has triphasic flow.   The left ankle [PT] artery has triphasic flow.  The left ankle [DP] artery has triphasic flow.    [facility]      ROS: All 10 systems reviewed. Please refer to the HPI for pertinent positives. All other systems negative.     Past Medical History  Past Medical History:   Diagnosis Date    Anemia due to unknown mechanism 11/10/2015    Angina pectoris 2014    not since    Arthritis     Back pain     Coronary artery disease     Diabetes mellitus with stage 3 chronic kidney disease 11/18/2020    DM (diabetes mellitus) 25-30 years     am 05/15/2019    DM (diabetes mellitus)     BS 97 am 06/04/2021    Encounter for blood transfusion     Gout 12/05/2017    right foot     History of blood transfusion     Hyperlipemia     Hypertension     CHARLES (obstructive sleep apnea)     Polyneuropathy     Spinal cord disease     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    Trouble in sleeping     Type 2 diabetes mellitus with diabetic polyneuropathy, without long-term current use of insulin     Urinary incontinence     Uses hearing aid     bilat       Surgical History  Past Surgical History:   Procedure Laterality Date    BACK SURGERY  2019    COLONOSCOPY N/A 6/30/2020    Procedure: COLONOSCOPY;  Surgeon: Joseph Iraheta MD;  Location: [facility] ENDO;  Service: Endoscopy;  Laterality: N/A;    ESOPHAGOGASTRODUODENOSCOPY N/A 6/30/2020    Procedure: EGD (ESOPHAGOGASTRODUODENOSCOPY);  Surgeon: Joseph Iraheta MD;  Location: [facility] ENDO;  Service: Endoscopy;  Laterality: N/A;    HERNIA REPAIR Bilateral 04/18/2017    INJECTION OF JOINT Right 4/14/2023    Procedure: right Synvisc Knee injection;  Surgeon: Hossein Brooks MD;  Location: [facility] PAIN MGT;  Service: Pain Management;  Laterality: Right;    JOINT REPLACEMENT Left 10/09/2017    L YAHIR Dr. Alcocer    LAMINECTOMY  07/22/2016    left elbow  10/2017    procedure to remove gout    lumbar foraminotomy  03/2018    REVISION TOTAL HIP ARTHROPLASTY Left 9/11/2018    Procedure: REVISION, TOTAL ARTHROPLASTY, HIP;  Surgeon: Albaro Alcocer Sr., MD;  Location: [facility] OR;  Service: Orthopedics;  Laterality: Left;    ROTATOR CUFF REPAIR Left 2006    SHOULDER ARTHROSCOPY W/ ROTATOR CUFF REPAIR Right 2009          Allergies:   Review of patient's allergies indicates:   Allergen Reactions    Sulfa (sulfonamide antibiotics)      Can't recall from 1995       Social History:  Social History     Socioeconomic History    Marital status:     Number of children: 0    Highest education level: Master's degree (e.g., MA, MS, Olegario, MEd, MSW, CARMEL)   Occupational History    Occupation: retired     Comment: teacher   Tobacco Use    Smoking status: Never    Smokeless tobacco: Never   Substance and Sexual Activity    Alcohol use: Yes     Alcohol/week: 1.0 standard drink     Types: 1 Glasses of wine per week     Comment: rarely  No  alcohol prior to surgery    Drug use: No    Sexual activity: Yes     Partners: Female     Birth control/protection: Condom   Social History Narrative    . No children. Retired but some part time work - 4 hours a day. 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 Caffeine intake - sugar free cola, Rare coffee intake. Still drives. Does have a Living Will . Has a nephew Jt Gayle, lives in [place]. Has a friend Karina Rossi, locally who could help him.        Family History:  family history includes Cancer (age of onset: 50) in his brother; Diabetes in his father, mother, and sister; Drug abuse in his brother; Heart disease in his father and mother; Hypertension in his father and mother; Stroke in his father.    Home Medications:  Current Outpatient Medications on File Prior to Visit   Medication Sig Dispense Refill    albuterol (VENTOLIN HFA) 90 mcg/actuation inhaler Inhale 2 puffs into the lungs every 6 (six) hours as needed for Wheezing or Shortness of Breath. Rescue 18 g 3    allopurinoL (ZYLOPRIM) 100 MG tablet TAKE 1 TABLET EVERY DAY 90 tablet 3    bisacodyl (DULCOLAX) 10 mg Supp   0    blood glucose control, low Soln by Misc.(Non-Drug; Combo Route) route.      blood sugar diagnostic (TRUE METRIX GLUCOSE TEST STRIP) Strp Use as directed to check sugars 100 strip 11    blood-glucose meter (TRUE METRIX AIR GLUCOSE METER) kit USE AS DIRECTED 1 each 0    ciprofloxacin HCl (CILOXAN) 0.3 % ophthalmic solution INSTILL 1 DROP IN RIGHT EYE TWICE DAILY FOR 1 WEEK THEN STOP      clonazePAM (KLONOPIN) 0.5 MG tablet Take 1 tablet (0.5 mg total) by mouth every evening. 30 tablet 3    cyanocobalamin, vitamin B-12, 1,000 mcg Subl Place 1,000 mcg under the tongue once daily. 90 tablet 3    doxepin (SINEQUAN) 10 MG capsule TAKE 1 CAPSULE(10 MG) BY MOUTH EVERY EVENING 90 capsule 0    GARLIC EXTRACT ORAL Take 1 tablet by mouth once daily. Per Negin Tyler [facility]      JARDIANCE 10 mg tablet       lancets Misc Use as directed to check  "sugars 100 each 11    LEXISCAN 0.4 mg/5 mL Syrg       losartan (COZAAR) 50 MG tablet Take 1 tablet (50 mg total) by mouth once daily. 90 tablet 3    metFORMIN (GLUCOPHAGE) 1000 MG tablet TAKE 1 TABLET TWICE DAILY WITH MEALS 180 tablet 3    metoprolol succinate (TOPROL-XL) 50 MG 24 hr tablet TAKE 1 TABLET EVERY DAY 90 tablet 3    mometasone 0.1% (ELOCON) 0.1 % cream Apply topically once daily. 50 g 2    moxifloxacin (VIGAMOX) 0.5 % ophthalmic solution       multivitamin (THERAGRAN) per tablet Take 1 tablet by mouth once daily.      papaverine 30 mg/mL injection Inject 0.3 ml of trimix solution prn ED max once daily  Prepare 10ml of trimix solution containing:    Papaverine 30mg/ml    Phentolamine 1 mg/ml    Alprostadil 10mcg/ml  Dispense 10ml per refill  Qs syringes 1cc/30g/0.5" and alcohol swabs dispense as needed for Intracavernosal injection 10 mL 5    PFIZER COVID BIVAL,12Y UP,,PF, 30 mcg/0.3 mL injection       pramipexole (MIRAPEX) 0.125 MG tablet Take 1 tablet (0.125 mg total) by mouth every evening. 30 tablet 3    pravastatin (PRAVACHOL) 40 MG tablet TAKE 1 TABLET EVERY DAY 90 tablet 2    prednisoLONE acetate (PRED FORTE) 1 % DrpS       pregabalin (LYRICA) 75 MG capsule Take 1-2 capsules ( mg total) by mouth every evening. 180 capsule 1    tamsulosin (FLOMAX) 0.4 mg Cap Take 1 capsule (0.4 mg total) by mouth once daily. 90 capsule 3    cetirizine (ZYRTEC) 10 MG tablet Take 1 tablet (10 mg total) by mouth every evening. for 14 days 14 tablet 0    olopatadine (PATANOL) 0.1 % ophthalmic solution Place 1 drop into the right eye 2 (two) times daily. 5 mL 0     No current facility-administered medications on file prior to visit.       Physical exam:  BP (!) 140/70 (BP Location: Left arm, Patient Position: Sitting)   Pulse 75   Wt 99.7 kg (219 lb 12.8 oz)   SpO2 98%   BMI 27.47 kg/m²       General: Pt is a 82 y.o. year old male who is AAOx3, in NAD, is pleasant, well nourished, looks stated age, walks with " a cane  HEENT: PERRL, EOMI, Oral mucosa pink & moist  CVS  No abnormal cardiac pulsations noted on inspection. JVP not raised. The apical impulse is normal on palpation, and is located in the left 5th intercostal space in the mid - clavicular line. No palpable thrills or abnormal pulsations noted. RR, S1 - S2 heard, 1/6 systolic murmur at apex, rubs or gallops appreciated.   PUL : CTA B/L. No wheezes/crakles heard   ABD : BS +, soft. No tenderness elicited   LE : No C/C/E. Distal Pulses palpable B/L         LABS:    Chemistry:   Lab Results   Component Value Date     12/06/2022    K 4.3 12/06/2022     12/06/2022    CO2 23 12/06/2022    BUN 17 12/06/2022    CREATININE 1.4 12/06/2022    CALCIUM 8.6 (L) 12/06/2022     Cardiac Markers:   Lab Results   Component Value Date    TROPONINI <0.006 03/10/2015     Cardiac Markers (Last 3):   Lab Results   Component Value Date    TROPONINI <0.006 03/10/2015     CBC:   Lab Results   Component Value Date    WBC 4.08 12/06/2022    HGB 12.7 (L) 12/06/2022    HCT 37.9 (L) 12/06/2022    MCV 88 12/06/2022     12/06/2022     Lipids:   Lab Results   Component Value Date    CHOL 128 10/07/2022    TRIG 153 (H) 10/07/2022    HDL 31 (L) 10/07/2022     Coagulation:   Lab Results   Component Value Date    INR 1.0 07/31/2019    APTT 26.2 07/31/2019           Assessment    1. Hypertension associated with diabetes    2. Combined hyperlipidemia associated with type 2 diabetes mellitus    3. Aortic calcification    4. Type 2 diabetes mellitus with diabetic polyneuropathy, without long-term current use of insulin    5. Osteoarthritis of spine with radiculopathy, lumbar region          Plan:    Dyspnea on exertion- stable   Possibly due to deconditioning  Echo 10/21 with normal EF, mild AI (unchanged from prior)  Pharmacologic nuclear stress test 10/21 without evidence of ischemia  F/u with pulmonology     Dizziness  Orthostatics in clinic normal  Hold tamsulosin    CAD  H/o  calcified aorta  Denies angina  Continue aspirin    HLD  LDL 66 as of 10/22  Continue pravastatin 40 mg daily    HTN  Stable  Continue losartan 50 mg daily, continue metoprolol 50 mg daily    Diabetes  Stable, A1c 6.2  Continue therapy     Arthritis  Chronic back and knee pain  Patient does not take pain medications  Ambulates with a cane          I have reviewed all pertinent chart information.  Plans and recommendations have been formulated under my direct supervision. All questions answered and patient voiced understanding.   If symptoms persist go to the ED.    RTC in 1 month      Yasmine Calvillo MD  Cardiology

## 2023-05-24 ENCOUNTER — HOSPITAL ENCOUNTER (OUTPATIENT)
Dept: RADIOLOGY | Facility: HOSPITAL | Age: 83
Discharge: HOME | End: 2023-05-24
Attending: NURSE PRACTITIONER
Payer: MEDICARE

## 2023-05-24 DIAGNOSIS — R55 SYNCOPE, UNSPECIFIED SYNCOPE TYPE: ICD-10-CM

## 2023-05-24 DIAGNOSIS — R42 DIZZINESS: ICD-10-CM

## 2023-05-24 LAB
ANION GAP SERPL CALC-SCNC: 12 MMOL/L (ref 8–16)
BUN SERPL-MCNC: 16 MG/DL (ref 8–23)
CALCIUM SERPL-MCNC: 9.1 MG/DL (ref 8.7–10.5)
CHLORIDE SERPL-SCNC: 109 MMOL/L (ref 95–110)
CO2 SERPL-SCNC: 24 MMOL/L (ref 23–29)
CREAT SERPL-MCNC: 1.4 MG/DL (ref 0.5–1.4)
EST. GFR  (NO RACE VARIABLE): 50.8 ML/MIN/1.73 M^2
GLUCOSE SERPL-MCNC: 97 MG/DL (ref 70–110)
POTASSIUM SERPL-SCNC: 4.4 MMOL/L (ref 3.5–5.1)
SODIUM SERPL-SCNC: 145 MMOL/L (ref 136–145)
TSH SERPL DL<=0.005 MIU/L-ACNC: 1.59 UIU/ML (ref 0.4–4)

## 2023-05-24 PROCEDURE — 93880 EXTRACRANIAL BILAT STUDY: CPT | Mod: 26,,, | Performed by: RADIOLOGY

## 2023-05-24 PROCEDURE — 93880 US CAROTID BILATERAL: ICD-10-PCS | Mod: 26,,, | Performed by: RADIOLOGY

## 2023-05-24 PROCEDURE — 93880 EXTRACRANIAL BILAT STUDY: CPT | Mod: TC,PN

## 2023-05-27 ENCOUNTER — TELEPHONE (OUTPATIENT)
Dept: ADMINISTRATIVE | Facility: HOSPITAL | Age: 83
End: 2023-05-27
Payer: MEDICARE

## 2023-06-08 ENCOUNTER — TELEPHONE (OUTPATIENT)
Dept: HEMATOLOGY/ONCOLOGY | Facility: CLINIC | Age: 83
End: 2023-06-08
Payer: MEDICARE

## 2023-06-08 NOTE — TELEPHONE ENCOUNTER
----- Message from Anya Ponce sent at 6/8/2023  2:28 PM CDT -----  Regarding: pt call back  Type:  Patient Returning Call     Pt states to schedule when ever apt is available     Who Called:         Who Left Message for Patient:Venus Valenzuela         Does the patient know what this is regarding? Scheduling          Best Call Back Number:There are no phone numbers on file.           Additional Information:

## 2023-06-08 NOTE — TELEPHONE ENCOUNTER
I have tried to reach out to the pt 3 times, including his contact information, but has not succeeded in reaching anyone yet. I just wanted to reschedule his appt from 06/16/23 to another day because Ms. Alan is not going to be available that day, message left for the pt to call back.

## 2023-06-14 ENCOUNTER — LAB VISIT (OUTPATIENT)
Dept: LAB | Facility: HOSPITAL | Age: 83
End: 2023-06-14
Attending: NURSE PRACTITIONER
Payer: MEDICARE

## 2023-06-14 DIAGNOSIS — D64.9 CHRONIC ANEMIA: ICD-10-CM

## 2023-06-14 LAB
BASOPHILS # BLD AUTO: 0.04 K/UL (ref 0–0.2)
BASOPHILS NFR BLD: 1 % (ref 0–1.9)
DIFFERENTIAL METHOD: ABNORMAL
EOSINOPHIL # BLD AUTO: 0.3 K/UL (ref 0–0.5)
EOSINOPHIL NFR BLD: 6.5 % (ref 0–8)
ERYTHROCYTE [DISTWIDTH] IN BLOOD BY AUTOMATED COUNT: 14.2 % (ref 11.5–14.5)
FERRITIN SERPL-MCNC: 44 NG/ML (ref 20–300)
HCT VFR BLD AUTO: 35.9 % (ref 40–54)
HGB BLD-MCNC: 11.7 G/DL (ref 14–18)
IMM GRANULOCYTES # BLD AUTO: 0.01 K/UL (ref 0–0.04)
IMM GRANULOCYTES NFR BLD AUTO: 0.3 % (ref 0–0.5)
IRON SERPL-MCNC: 71 UG/DL (ref 45–160)
LYMPHOCYTES # BLD AUTO: 1.7 K/UL (ref 1–4.8)
LYMPHOCYTES NFR BLD: 41.4 % (ref 18–48)
MCH RBC QN AUTO: 28.7 PG (ref 27–31)
MCHC RBC AUTO-ENTMCNC: 32.6 G/DL (ref 32–36)
MCV RBC AUTO: 88 FL (ref 82–98)
MONOCYTES # BLD AUTO: 0.3 K/UL (ref 0.3–1)
MONOCYTES NFR BLD: 8 % (ref 4–15)
NEUTROPHILS # BLD AUTO: 1.7 K/UL (ref 1.8–7.7)
NEUTROPHILS NFR BLD: 42.8 % (ref 38–73)
NRBC BLD-RTO: 0 /100 WBC
PLATELET # BLD AUTO: 183 K/UL (ref 150–450)
PMV BLD AUTO: 11.1 FL (ref 9.2–12.9)
RBC # BLD AUTO: 4.08 M/UL (ref 4.6–6.2)
SATURATED IRON: 26 % (ref 20–50)
TOTAL IRON BINDING CAPACITY: 268 UG/DL (ref 250–450)
TRANSFERRIN SERPL-MCNC: 181 MG/DL (ref 200–375)
WBC # BLD AUTO: 3.99 K/UL (ref 3.9–12.7)

## 2023-06-14 PROCEDURE — 36415 COLL VENOUS BLD VENIPUNCTURE: CPT | Mod: PO | Performed by: NURSE PRACTITIONER

## 2023-06-14 PROCEDURE — 82728 ASSAY OF FERRITIN: CPT | Performed by: NURSE PRACTITIONER

## 2023-06-14 PROCEDURE — 85025 COMPLETE CBC W/AUTO DIFF WBC: CPT | Performed by: NURSE PRACTITIONER

## 2023-06-14 PROCEDURE — 84466 ASSAY OF TRANSFERRIN: CPT | Performed by: NURSE PRACTITIONER

## 2023-06-22 ENCOUNTER — OFFICE VISIT (OUTPATIENT)
Dept: URGENT CARE | Facility: CLINIC | Age: 83
End: 2023-06-22
Payer: MEDICARE

## 2023-06-22 VITALS
SYSTOLIC BLOOD PRESSURE: 150 MMHG | HEART RATE: 83 BPM | HEIGHT: 75 IN | RESPIRATION RATE: 18 BRPM | WEIGHT: 218 LBS | TEMPERATURE: 98 F | BODY MASS INDEX: 27.1 KG/M2 | DIASTOLIC BLOOD PRESSURE: 71 MMHG | OXYGEN SATURATION: 96 %

## 2023-06-22 DIAGNOSIS — R50.9 SUBJECTIVE FEVER: ICD-10-CM

## 2023-06-22 DIAGNOSIS — E11.59 HYPERTENSION ASSOCIATED WITH DIABETES: ICD-10-CM

## 2023-06-22 DIAGNOSIS — I15.2 HYPERTENSION ASSOCIATED WITH DIABETES: ICD-10-CM

## 2023-06-22 DIAGNOSIS — R05.1 ACUTE COUGH: ICD-10-CM

## 2023-06-22 DIAGNOSIS — J06.9 VIRAL URI WITH COUGH: Primary | ICD-10-CM

## 2023-06-22 DIAGNOSIS — Z91.89 AT INCREASED RISK OF EXPOSURE TO COVID-19 VIRUS: ICD-10-CM

## 2023-06-22 LAB
CTP QC/QA: YES
SARS-COV-2 AG RESP QL IA.RAPID: NEGATIVE

## 2023-06-22 PROCEDURE — 99213 OFFICE O/P EST LOW 20 MIN: CPT | Mod: S$GLB,,, | Performed by: NURSE PRACTITIONER

## 2023-06-22 PROCEDURE — 99213 PR OFFICE/OUTPT VISIT, EST, LEVL III, 20-29 MIN: ICD-10-PCS | Mod: S$GLB,,, | Performed by: NURSE PRACTITIONER

## 2023-06-22 PROCEDURE — 87811 SARS CORONAVIRUS 2 ANTIGEN POCT, MANUAL READ: ICD-10-PCS | Mod: QW,S$GLB,, | Performed by: NURSE PRACTITIONER

## 2023-06-22 PROCEDURE — 87811 SARS-COV-2 COVID19 W/OPTIC: CPT | Mod: QW,S$GLB,, | Performed by: NURSE PRACTITIONER

## 2023-06-22 RX ORDER — LEVOCETIRIZINE DIHYDROCHLORIDE 5 MG/1
5 TABLET, FILM COATED ORAL NIGHTLY
Qty: 30 TABLET | Refills: 11 | Status: SHIPPED | OUTPATIENT
Start: 2023-06-22 | End: 2023-10-12

## 2023-06-22 RX ORDER — FLUTICASONE PROPIONATE 50 MCG
1 SPRAY, SUSPENSION (ML) NASAL DAILY
Qty: 16 ML | Refills: 0 | Status: SHIPPED | OUTPATIENT
Start: 2023-06-22 | End: 2023-10-12

## 2023-06-22 RX ORDER — BENZONATATE 100 MG/1
100 CAPSULE ORAL 3 TIMES DAILY PRN
Qty: 30 CAPSULE | Refills: 0 | Status: SHIPPED | OUTPATIENT
Start: 2023-06-22 | End: 2023-07-02

## 2023-06-22 NOTE — PROGRESS NOTES
"Subjective:      Patient ID: Srinath Mcknight is a 82 y.o. male.    Vitals:  height is 6' 3" (1.905 m) and weight is 98.9 kg (218 lb). His oral temperature is 98.3 °F (36.8 °C). His blood pressure is 150/71 (abnormal) and his pulse is 83. His respiration is 18 and oxygen saturation is 96%.     Chief Complaint: Headache (Congestion and cough x 2 days)    Srinath Mcknight is a 82 year old male whom  presents with congestion, cough, and headache for 3 days. Patient reports he has been exposed to covid by his wife. Patient reports he has tried OTC allergy medicine with minimal relief.     Headache   This is a new problem. The current episode started in the past 7 days. The problem occurs constantly. The problem has been gradually worsening. Pain location: all over. The pain is at a severity of 2/10. The pain is mild. Associated symptoms include coughing and sinus pressure. He has tried nothing for the symptoms. The treatment provided no relief. His past medical history is significant for hypertension.     HENT:  Positive for sinus pressure.    Respiratory:  Positive for cough.    Neurological:  Positive for headaches.    Objective:     Physical Exam   Constitutional: He is oriented to person, place, and time. He appears well-developed. He is cooperative.  Non-toxic appearance. He does not appear ill. No distress.   HENT:   Head: Normocephalic and atraumatic.   Ears:   Right Ear: Hearing, tympanic membrane, external ear and ear canal normal.   Left Ear: Hearing, tympanic membrane, external ear and ear canal normal.   Nose: Nose normal. No mucosal edema, rhinorrhea or nasal deformity. No epistaxis. Right sinus exhibits no maxillary sinus tenderness and no frontal sinus tenderness. Left sinus exhibits no maxillary sinus tenderness and no frontal sinus tenderness.   Mouth/Throat: Uvula is midline, oropharynx is clear and moist and mucous membranes are normal. No trismus in the jaw. Normal dentition. No uvula swelling. No " oropharyngeal exudate, posterior oropharyngeal edema or posterior oropharyngeal erythema.   Eyes: Conjunctivae and lids are normal. No scleral icterus.   Neck: Trachea normal and phonation normal. Neck supple. No edema present. No erythema present. No neck rigidity present.   Cardiovascular: Normal rate, regular rhythm, normal heart sounds and normal pulses.   Pulmonary/Chest: Effort normal and breath sounds normal. No respiratory distress. He has no decreased breath sounds. He has no rhonchi.   Abdominal: Normal appearance and bowel sounds are normal. He exhibits no distension, no pulsatile midline mass and no mass. Soft. protuberant There is no abdominal tenderness.   Musculoskeletal: Normal range of motion.         General: No deformity. Normal range of motion.   Neurological: no focal deficit. He is alert, oriented to person, place, and time and at baseline. He exhibits normal muscle tone. Coordination normal.   Skin: Skin is warm, dry, intact, not diaphoretic and not pale. Capillary refill takes less than 2 seconds.   Psychiatric: His speech is normal and behavior is normal. Mood, judgment and thought content normal.   Nursing note and vitals reviewed.  Results for orders placed or performed in visit on 06/22/23   SARS Coronavirus 2 Antigen, POCT Manual Read   Result Value Ref Range    SARS Coronavirus 2 Antigen Negative Negative     Acceptable Yes      *Note: Due to a large number of results and/or encounters for the requested time period, some results have not been displayed. A complete set of results can be found in Results Review.       Assessment:     1. Viral URI with cough    2. At increased risk of exposure to COVID-19 virus    3. Acute cough    4. Subjective fever    5. Hypertension associated with diabetes        Plan:       Viral URI with cough  -     fluticasone propionate (FLONASE) 50 mcg/actuation nasal spray; 1 spray (50 mcg total) by Each Nostril route once daily.  Dispense: 16  mL; Refill: 0  -     levocetirizine (XYZAL) 5 MG tablet; Take 1 tablet (5 mg total) by mouth every evening.  Dispense: 30 tablet; Refill: 11  -     benzonatate (TESSALON) 100 MG capsule; Take 1 capsule (100 mg total) by mouth 3 (three) times daily as needed for Cough (Do not take more than 6 capsules in a 24 hour period.).  Dispense: 30 capsule; Refill: 0    At increased risk of exposure to COVID-19 virus  -     SARS Coronavirus 2 Antigen, POCT Manual Read    Acute cough  -     SARS Coronavirus 2 Antigen, POCT Manual Read  -     benzonatate (TESSALON) 100 MG capsule; Take 1 capsule (100 mg total) by mouth 3 (three) times daily as needed for Cough (Do not take more than 6 capsules in a 24 hour period.).  Dispense: 30 capsule; Refill: 0    Subjective fever  -     SARS Coronavirus 2 Antigen, POCT Manual Read    Hypertension associated with diabetes          Medical Decision Making:   Urgent Care Management:  Previous encounters were independently reviewed. Discussed with patient  all pertinent information and results. Discussed patient diagnosis and plan of treatment. Additional plan of care as outlined above. Patient  was given all follow up and return instructions. All questions and concerns were addressed at this time. Patient  expresses understanding of information and instructions, and is comfortable with plan.    Patient was instructed to follow up with his Primary Care Provider if no improvement in symptoms in 5-7 DAYS:  or go to ED immediately for any worsening or change in current symptoms.       Patient Instructions   PLEASE READ YOUR DISCHARGE INSTRUCTIONS ENTIRELY AS IT CONTAINS IMPORTANT INFORMATION.    Please drink plenty of fluids.    Please get plenty of rest.    Please return here or go to the Emergency Department for any concerns or worsening of condition.    Please take an over the counter antihistamine medication (Allegra/Claritin/Zyrtec/xyzal) of your choice as directed for allergy symptoms and/or  runny nose and postnasal drip.    Try an over the counter decongestant for sinus pressure/ear pressure, congestion symptoms like Mucinex D or Sudafed or Phenylephrine. You buy this behind the pharmacy counter.    If you do have Hypertension or palpitations, it is safe to take Coricidin HBP for relief of sinus symptoms.    Certain OTC cough and cold medications may raise your blood pressure. To keep your blood pressure regulated avoid over-the-counter decongestants and multisymptom cold remedies that contain decongestants -- such as pseudoephedrine, ephedrine, phenylephrine, naphazoline and oxymetazoline. Also, check the label for high sodium content, which can also raise blood pressure.       Tylenol or ibuprofen can also be used as directed for pain and fever unless you have an allergy to them or medical condition such as stomach ulcers, kidney or liver disease or blood thinners etc for which you should not be taking these type of medications.     SORE THROAT:    You may gargle with hot salt water 4 times a day for the next 2 days and then you may also gargle diluted hydrogen peroxide once to twice daily to alleviate some of your throat discomfort.  Drink plenty of fluids, recommend warm tea with honey.     YOU MAY USE OVER-THE-COUNTER CEPACOL FOR SOOTHING OF YOUR THROAT.  You may wish to avoid spicy food, citrus fruits, and red sauces- as this may irritate the throat more.        Use over the counter Flonase or Nasocort: one spray each nostril twice daily OR two sprays each nostril once daily until nares dry out, unless you have Glaucoma.   Can also supplement with nasal saline rinse.    Sinus rinses DO NOT USE TAP WATER, if you must, water must be at a rolling boil for 1 minute, let it cool, then use.  May use distilled water, or over the counter nasal saline rinses.  Trav's vapor rub in shower to help open nasal passages.  May use nasal gel to keep passages moisturized.  May use nasal saline sprays during the  day for added relief of congestion.   For those who go to the gym, please do not use the sauna or steam room now to clear sinuses.    Cough     Rest and fluids are important  Can use honey with heather to soothe your throat    Robitussin or Delsyum for cough suppressant for dry cough.    Mucinex DM or products containing Guaifenesin or Dextromethorphan for expectorant (wet cough).    Take prescription cough meds (pills) as prescribed; Please follow up with your primary care doctor or specialist in the next 48-72hrs as needed and if no improvement    If you smoke, please stop smoking.    Please return or see your primary care doctor if you develop new or worsening symptoms.     Lastly, good hand washing and cough hygiene (cough into your elbow) will help prevent the spread of the illness. A general rule is that you are no longer contagious once you have been without a fever for over 24 hours without requiring fever reducing medications.   Elevated Blood Pressure  Your blood pressure was elevated during your visit to the urgent care.  It was not so high that immediate care was needed but it is recommended that you monitor your blood pressure over the next week or two to make sure that it is not staying elevated.  Please have your blood pressure taken 2-3 times daily at different times of the day.  Write all of those blood pressures down and record the time that they were taken.  Keep all that information and take it with you to see your Primary Care Physician.  If your blood pressure is consistently above 140/90 you will need to follow up with your PCP more quickly    Please arrange follow up with your primary medical clinic as soon as possible. You must understand that you've received an Urgent Care treatment only and that you may be released before all of your medical problems are known or treated. You, the patient, will arrange for follow up as instructed. If your symptoms worsen or fail to improve you should go to  the Emergency Room.

## 2023-06-22 NOTE — PATIENT INSTRUCTIONS
PLEASE READ YOUR DISCHARGE INSTRUCTIONS ENTIRELY AS IT CONTAINS IMPORTANT INFORMATION.    Please drink plenty of fluids.    Please get plenty of rest.    Please return here or go to the Emergency Department for any concerns or worsening of condition.    Please take an over the counter antihistamine medication (Allegra/Claritin/Zyrtec/xyzal) of your choice as directed for allergy symptoms and/or runny nose and postnasal drip.    Try an over the counter decongestant for sinus pressure/ear pressure, congestion symptoms like Mucinex D or Sudafed or Phenylephrine. You buy this behind the pharmacy counter.    If you do have Hypertension or palpitations, it is safe to take Coricidin HBP for relief of sinus symptoms.    Certain OTC cough and cold medications may raise your blood pressure. To keep your blood pressure regulated avoid over-the-counter decongestants and multisymptom cold remedies that contain decongestants -- such as pseudoephedrine, ephedrine, phenylephrine, naphazoline and oxymetazoline. Also, check the label for high sodium content, which can also raise blood pressure.       Tylenol or ibuprofen can also be used as directed for pain and fever unless you have an allergy to them or medical condition such as stomach ulcers, kidney or liver disease or blood thinners etc for which you should not be taking these type of medications.     SORE THROAT:    You may gargle with hot salt water 4 times a day for the next 2 days and then you may also gargle diluted hydrogen peroxide once to twice daily to alleviate some of your throat discomfort.  Drink plenty of fluids, recommend warm tea with honey.     YOU MAY USE OVER-THE-COUNTER CEPACOL FOR SOOTHING OF YOUR THROAT.  You may wish to avoid spicy food, citrus fruits, and red sauces- as this may irritate the throat more.        Use over the counter Flonase or Nasocort: one spray each nostril twice daily OR two sprays each nostril once daily until nares dry out, unless you  have Glaucoma.   Can also supplement with nasal saline rinse.    Sinus rinses DO NOT USE TAP WATER, if you must, water must be at a rolling boil for 1 minute, let it cool, then use.  May use distilled water, or over the counter nasal saline rinses.  Trav's vapor rub in shower to help open nasal passages.  May use nasal gel to keep passages moisturized.  May use nasal saline sprays during the day for added relief of congestion.   For those who go to the gym, please do not use the sauna or steam room now to clear sinuses.    Cough     Rest and fluids are important  Can use honey with heather to soothe your throat    Robitussin or Delsyum for cough suppressant for dry cough.    Mucinex DM or products containing Guaifenesin or Dextromethorphan for expectorant (wet cough).    Take prescription cough meds (pills) as prescribed; Please follow up with your primary care doctor or specialist in the next 48-72hrs as needed and if no improvement    If you smoke, please stop smoking.    Please return or see your primary care doctor if you develop new or worsening symptoms.     Lastly, good hand washing and cough hygiene (cough into your elbow) will help prevent the spread of the illness. A general rule is that you are no longer contagious once you have been without a fever for over 24 hours without requiring fever reducing medications.   Elevated Blood Pressure  Your blood pressure was elevated during your visit to the urgent care.  It was not so high that immediate care was needed but it is recommended that you monitor your blood pressure over the next week or two to make sure that it is not staying elevated.  Please have your blood pressure taken 2-3 times daily at different times of the day.  Write all of those blood pressures down and record the time that they were taken.  Keep all that information and take it with you to see your Primary Care Physician.  If your blood pressure is consistently above 140/90 you will need to  follow up with your PCP more quickly    Please arrange follow up with your primary medical clinic as soon as possible. You must understand that you've received an Urgent Care treatment only and that you may be released before all of your medical problems are known or treated. You, the patient, will arrange for follow up as instructed. If your symptoms worsen or fail to improve you should go to the Emergency Room.

## 2023-06-24 DIAGNOSIS — L29.9 PRURITUS: ICD-10-CM

## 2023-06-25 ENCOUNTER — TELEPHONE (OUTPATIENT)
Dept: URGENT CARE | Facility: CLINIC | Age: 83
End: 2023-06-25
Payer: MEDICARE

## 2023-06-29 RX ORDER — DOXEPIN HYDROCHLORIDE 10 MG/1
CAPSULE ORAL
Qty: 90 CAPSULE | Refills: 0 | Status: SHIPPED | OUTPATIENT
Start: 2023-06-29 | End: 2023-07-06

## 2023-07-05 ENCOUNTER — OFFICE VISIT (OUTPATIENT)
Dept: HEMATOLOGY/ONCOLOGY | Facility: CLINIC | Age: 83
End: 2023-07-05
Payer: MEDICARE

## 2023-07-05 VITALS
OXYGEN SATURATION: 99 % | HEIGHT: 75 IN | BODY MASS INDEX: 27.11 KG/M2 | TEMPERATURE: 98 F | SYSTOLIC BLOOD PRESSURE: 122 MMHG | DIASTOLIC BLOOD PRESSURE: 65 MMHG | WEIGHT: 218.06 LBS | HEART RATE: 71 BPM

## 2023-07-05 DIAGNOSIS — D64.9 CHRONIC ANEMIA: ICD-10-CM

## 2023-07-05 DIAGNOSIS — D57.3 SICKLE CELL TRAIT: ICD-10-CM

## 2023-07-05 DIAGNOSIS — D72.819 LEUKOPENIA, UNSPECIFIED TYPE: ICD-10-CM

## 2023-07-05 PROCEDURE — 1159F MED LIST DOCD IN RCRD: CPT | Mod: HCNC,CPTII,S$GLB, | Performed by: NURSE PRACTITIONER

## 2023-07-05 PROCEDURE — 1101F PT FALLS ASSESS-DOCD LE1/YR: CPT | Mod: HCNC,CPTII,S$GLB, | Performed by: NURSE PRACTITIONER

## 2023-07-05 PROCEDURE — 99213 OFFICE O/P EST LOW 20 MIN: CPT | Mod: HCNC,S$GLB,, | Performed by: NURSE PRACTITIONER

## 2023-07-05 PROCEDURE — 99499 RISK ADDL DX/OHS AUDIT: ICD-10-PCS | Mod: HCNC,S$GLB,, | Performed by: NURSE PRACTITIONER

## 2023-07-05 PROCEDURE — 3288F FALL RISK ASSESSMENT DOCD: CPT | Mod: HCNC,CPTII,S$GLB, | Performed by: NURSE PRACTITIONER

## 2023-07-05 PROCEDURE — 3288F PR FALLS RISK ASSESSMENT DOCUMENTED: ICD-10-PCS | Mod: HCNC,CPTII,S$GLB, | Performed by: NURSE PRACTITIONER

## 2023-07-05 PROCEDURE — 99499 UNLISTED E&M SERVICE: CPT | Mod: HCNC,S$GLB,, | Performed by: NURSE PRACTITIONER

## 2023-07-05 PROCEDURE — 1125F PR PAIN SEVERITY QUANTIFIED, PAIN PRESENT: ICD-10-PCS | Mod: HCNC,CPTII,S$GLB, | Performed by: NURSE PRACTITIONER

## 2023-07-05 PROCEDURE — 1160F PR REVIEW ALL MEDS BY PRESCRIBER/CLIN PHARMACIST DOCUMENTED: ICD-10-PCS | Mod: HCNC,CPTII,S$GLB, | Performed by: NURSE PRACTITIONER

## 2023-07-05 PROCEDURE — 3078F DIAST BP <80 MM HG: CPT | Mod: HCNC,CPTII,S$GLB, | Performed by: NURSE PRACTITIONER

## 2023-07-05 PROCEDURE — 3074F PR MOST RECENT SYSTOLIC BLOOD PRESSURE < 130 MM HG: ICD-10-PCS | Mod: HCNC,CPTII,S$GLB, | Performed by: NURSE PRACTITIONER

## 2023-07-05 PROCEDURE — 99999 PR PBB SHADOW E&M-EST. PATIENT-LVL V: ICD-10-PCS | Mod: PBBFAC,HCNC,, | Performed by: NURSE PRACTITIONER

## 2023-07-05 PROCEDURE — 99999 PR PBB SHADOW E&M-EST. PATIENT-LVL V: CPT | Mod: PBBFAC,HCNC,, | Performed by: NURSE PRACTITIONER

## 2023-07-05 PROCEDURE — 1101F PR PT FALLS ASSESS DOC 0-1 FALLS W/OUT INJ PAST YR: ICD-10-PCS | Mod: HCNC,CPTII,S$GLB, | Performed by: NURSE PRACTITIONER

## 2023-07-05 PROCEDURE — 99213 PR OFFICE/OUTPT VISIT, EST, LEVL III, 20-29 MIN: ICD-10-PCS | Mod: HCNC,S$GLB,, | Performed by: NURSE PRACTITIONER

## 2023-07-05 PROCEDURE — 1160F RVW MEDS BY RX/DR IN RCRD: CPT | Mod: HCNC,CPTII,S$GLB, | Performed by: NURSE PRACTITIONER

## 2023-07-05 PROCEDURE — 1125F AMNT PAIN NOTED PAIN PRSNT: CPT | Mod: HCNC,CPTII,S$GLB, | Performed by: NURSE PRACTITIONER

## 2023-07-05 PROCEDURE — 3078F PR MOST RECENT DIASTOLIC BLOOD PRESSURE < 80 MM HG: ICD-10-PCS | Mod: HCNC,CPTII,S$GLB, | Performed by: NURSE PRACTITIONER

## 2023-07-05 PROCEDURE — 3074F SYST BP LT 130 MM HG: CPT | Mod: HCNC,CPTII,S$GLB, | Performed by: NURSE PRACTITIONER

## 2023-07-05 PROCEDURE — 1159F PR MEDICATION LIST DOCUMENTED IN MEDICAL RECORD: ICD-10-PCS | Mod: HCNC,CPTII,S$GLB, | Performed by: NURSE PRACTITIONER

## 2023-07-05 NOTE — PROGRESS NOTES
Subjective:       Patient ID: Srinath Mcknight is a 82 y.o. male.    Chief Complaint: F/U anemia    HPI:  82 y.o. male with HTN, OA, chronic anemia, DM, CAD, HLD follows up in the Heme-Onc clinic for h/o chronic anemia. Prior workup for chronic anemia unremarkable. The patient has two negative stool occult blood testing on file. Upon review of the medical record the patient has not had documented iron deficiency but iron levels are low normal. Patient does have sickle cell trait. He tells me this was already known to him.     Was on oral iron supplementation but d/c'd 12/2022 due to constipation.    Today's visit: Patient presents today for follow up of his chronic anemia.  Taking vitamin B12 supplement without missed doses. Today he notes feeling well overall. Denies any new or worsening clinical concerns.    Social History     Socioeconomic History    Marital status:     Number of children: 0    Highest education level: Master's degree (e.g., MA, MS, Olegario, MEd, MSW, CARMEL)   Occupational History    Occupation: retired     Comment: teacher   Tobacco Use    Smoking status: Never    Smokeless tobacco: Never   Substance and Sexual Activity    Alcohol use: Yes     Alcohol/week: 1.0 standard drink     Types: 1 Glasses of wine per week     Comment: rarely  No alcohol prior to surgery    Drug use: No    Sexual activity: Yes     Partners: Female     Birth control/protection: Condom   Social History Narrative    . No children. Retired but some part time work - 4 hours a day. 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 Caffeine intake - sugar free cola, Rare coffee intake. Still drives. Does have a Living Will . Has a nephew Jt Gayle, lives in [place]. Has a friend Karina Rossi, locally who could help him.        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    Trouble in sleeping     Type 2 diabetes mellitus with diabetic polyneuropathy, without long-term current use of insulin     Urinary incontinence     Uses hearing aid     bilat       Family History   Problem Relation Age of Onset    Hypertension Mother     Heart disease Mother     Diabetes Mother     Hypertension Father     Heart disease Father     Diabetes Father     Stroke Father     Diabetes Sister     Cancer Brother 50        pancreas    Drug abuse Brother     Prostate cancer Neg Hx     Macular degeneration Neg Hx     Retinal detachment Neg Hx     Strabismus Neg Hx     Glaucoma Neg Hx     Blindness Neg Hx     Amblyopia Neg Hx     Kidney disease Neg Hx     Mental illness Neg Hx     Mental retardation Neg Hx     COPD Neg Hx     Asthma Neg Hx        Past Surgical History:   Procedure Laterality Date    BACK SURGERY  2019    COLONOSCOPY N/A 6/30/2020    Procedure: COLONOSCOPY;  Surgeon: Joseph Iraheta MD;  Location: [facility] END[facility];  Service: Endoscopy;  Laterality: N/A;    ESOPHAGOGASTRODUODENOSCOPY N/A 6/30/2020    Procedure: EGD (ESOPHAGOGASTRODUODENOSCOPY);  Surgeon: Joseph Iraheta MD;  Location: [facility] END[facility];  Service: Endoscopy;  Laterality: N/A;    HERNIA REPAIR Bilateral 04/18/2017    INJECTION OF JOINT Right 4/14/2023    Procedure: right Synvisc Knee injection;  Surgeon: Hossein Boroks MD;  Location: [facility];  Service: Pain Management;  Laterality: Right;    JOINT REPLACEMENT Left 10/09/2017    L YAHIR Dr. Alcocer    LAMINECTOMY  07/22/2016    left elbow  10/2017    procedure  to remove gout    lumbar foraminotomy  03/2018    REVISION TOTAL HIP ARTHROPLASTY Left 9/11/2018    Procedure: REVISION, TOTAL ARTHROPLASTY, HIP;  Surgeon: Albaro Alcocer Sr., MD;  Location: [facility];  Service: Orthopedics;  Laterality: Left;    ROTATOR CUFF REPAIR Left 2006    SHOULDER ARTHROSCOPY W/ ROTATOR CUFF REPAIR Right 2009       Review of Systems   Constitutional:  Negative for activity change, appetite change, chills, diaphoresis, fatigue, fever and unexpected weight change.   HENT:  Negative for nosebleeds, sore throat, trouble swallowing and voice change.    Eyes:  Negative for visual disturbance.   Respiratory:  Negative for cough, chest tightness, shortness of breath (with exertion, chronic) and wheezing.    Cardiovascular:  Negative for chest pain, palpitations and leg swelling.   Gastrointestinal:  Negative for abdominal distention, abdominal pain, anal bleeding, blood in stool, constipation, diarrhea, nausea and vomiting.   Genitourinary:  Negative for difficulty urinating, dysuria and hematuria.   Musculoskeletal:  Positive for arthralgias and gait problem (utilizes cane). Negative for back pain and myalgias.        Patient walks with a cane   Skin:  Negative for rash and wound.   Neurological:  Negative for dizziness, weakness, light-headedness and headaches.   Hematological:  Does not bruise/bleed easily.   Psychiatric/Behavioral:  The patient is not nervous/anxious.        Medication List with Changes/Refills   Current Medications    ALBUTEROL (VENTOLIN HFA) 90 MCG/ACTUATION INHALER    Inhale 2 puffs into the lungs every 6 (six) hours as needed for Wheezing or Shortness of Breath. Rescue    ALLOPURINOL (ZYLOPRIM) 100 MG TABLET    TAKE 1 TABLET EVERY DAY    BISACODYL (DULCOLAX) 10 MG SUPP        BLOOD GLUCOSE CONTROL, LOW SOLN    by Misc.(Non-Drug; Combo Route) route.    BLOOD SUGAR DIAGNOSTIC (TRUE METRIX GLUCOSE TEST STRIP) STRP    Use as directed to check sugars    BLOOD-GLUCOSE METER (TRUE  "METRIX AIR GLUCOSE METER) KIT    USE AS DIRECTED    CETIRIZINE (ZYRTEC) 10 MG TABLET    Take 1 tablet (10 mg total) by mouth every evening. for 14 days    CIPROFLOXACIN HCL (CILOXAN) 0.3 % OPHTHALMIC SOLUTION    INSTILL 1 DROP IN RIGHT EYE TWICE DAILY FOR 1 WEEK THEN STOP    CLONAZEPAM (KLONOPIN) 0.5 MG TABLET    Take 1 tablet (0.5 mg total) by mouth every evening.    CYANOCOBALAMIN, VITAMIN B-12, 1,000 MCG SUBL    Place 1,000 mcg under the tongue once daily.    DOXEPIN (SINEQUAN) 10 MG CAPSULE    TAKE 1 CAPSULE(10 MG) BY MOUTH EVERY EVENING    FLUTICASONE PROPIONATE (FLONASE) 50 MCG/ACTUATION NASAL SPRAY    1 spray (50 mcg total) by Each Nostril route once daily.    GARLIC EXTRACT ORAL    Take 1 tablet by mouth once daily. Per Negin Tyler [facility]    JARDIANCE 10 MG TABLET        LANCETS MISC    Use as directed to check sugars    LEVOCETIRIZINE (XYZAL) 5 MG TABLET    Take 1 tablet (5 mg total) by mouth every evening.    LEXISCAN 0.4 MG/5 ML SYRG        LOSARTAN (COZAAR) 50 MG TABLET    Take 1 tablet (50 mg total) by mouth once daily.    METFORMIN (GLUCOPHAGE) 1000 MG TABLET    TAKE 1 TABLET TWICE DAILY WITH MEALS    METOPROLOL SUCCINATE (TOPROL-XL) 50 MG 24 HR TABLET    TAKE 1 TABLET EVERY DAY    MOMETASONE 0.1% (ELOCON) 0.1 % CREAM    Apply topically once daily.    MOXIFLOXACIN (VIGAMOX) 0.5 % OPHTHALMIC SOLUTION        MULTIVITAMIN (THERAGRAN) PER TABLET    Take 1 tablet by mouth once daily.    OLOPATADINE (PATANOL) 0.1 % OPHTHALMIC SOLUTION    Place 1 drop into the right eye 2 (two) times daily.    PAPAVERINE 30 MG/ML INJECTION    Inject 0.3 ml of trimix solution prn ED max once daily  Prepare 10ml of trimix solution containing:    Papaverine 30mg/ml    Phentolamine 1 mg/ml    Alprostadil 10mcg/ml  Dispense 10ml per refill  Qs syringes 1cc/30g/0.5" and alcohol swabs dispense as needed for Intracavernosal injection    PFIZER COVID BIVAL,12Y UP,,PF, 30 MCG/0.3 ML INJECTION        PRAMIPEXOLE (MIRAPEX) 0.125 MG TABLET "    Take 1 tablet (0.125 mg total) by mouth every evening.    PRAVASTATIN (PRAVACHOL) 40 MG TABLET    TAKE 1 TABLET EVERY DAY    PREDNISOLONE ACETATE (PRED FORTE) 1 % DRPS        PREGABALIN (LYRICA) 75 MG CAPSULE    Take 1-2 capsules ( mg total) by mouth every evening.    TAMSULOSIN (FLOMAX) 0.4 MG CAP    Take 1 capsule (0.4 mg total) by mouth once daily.     Objective:     Vitals:    07/05/23 1102   BP: 122/65   Pulse: 71   Temp: 97.6 °F (36.4 °C)     Lab Results   Component Value Date    WBC 3.99 06/14/2023    HGB 11.7 (L) 06/14/2023    HCT 35.9 (L) 06/14/2023    MCV 88 06/14/2023     06/14/2023     Lab Results   Component Value Date    IRON 71 06/14/2023    TIBC 268 06/14/2023    FERRITIN 44 06/14/2023     BMP  Lab Results   Component Value Date     05/23/2023    K 4.4 05/23/2023     05/23/2023    CO2 24 05/23/2023    BUN 16 05/23/2023    CREATININE 1.4 05/23/2023    CALCIUM 9.1 05/23/2023    ANIONGAP 12 05/23/2023    ESTGFRAFRICA 43 (A) 06/01/2022    EGFRNONAA 38 (A) 06/01/2022     Lab Results   Component Value Date    ALT 18 12/06/2022    AST 26 12/06/2022    ALKPHOS 52 (L) 12/06/2022    BILITOT 0.5 12/06/2022               Physical Exam  Vitals reviewed.   Constitutional:       General: He is not in acute distress.     Appearance: He is well-developed.   HENT:      Head: Normocephalic.      Right Ear: External ear normal.      Left Ear: External ear normal.   Eyes:      General: Lids are normal.         Right eye: No discharge.         Left eye: No discharge.      Conjunctiva/sclera: Conjunctivae normal.   Neck:      Thyroid: No thyroid mass.   Cardiovascular:      Rate and Rhythm: Normal rate and regular rhythm.      Heart sounds: Normal heart sounds. No murmur heard.    No friction rub. No gallop.   Pulmonary:      Effort: Pulmonary effort is normal. No respiratory distress.      Breath sounds: Normal breath sounds. No wheezing or rales.   Chest:      Chest wall: No tenderness.    Abdominal:      General: Bowel sounds are normal. There is no distension.      Palpations: Abdomen is soft.   Genitourinary:     Comments: deferred  Musculoskeletal:         General: Normal range of motion.      Cervical back: Normal range of motion.      Comments: Patient walks with a cane.    Lymphadenopathy:      Head:      Right side of head: No submandibular, preauricular or posterior auricular adenopathy.      Left side of head: No submandibular, preauricular or posterior auricular adenopathy.   Skin:     General: Skin is warm and dry.   Neurological:      Mental Status: He is alert and oriented to person, place, and time.   Psychiatric:         Speech: Speech normal.         Behavior: Behavior normal. Behavior is cooperative.         Thought Content: Thought content normal.        Assessment:     Problem List Items Addressed This Visit          Oncology    Chronic anemia     Anemia multifactorial including CKD, chronic disease, sickle cell trait    Laboratory review overall stable. Hg 11.7, remains near baseline is 11-12 range. Anemia workup has been essentially unremarkable. Vitamin B12 on the lower end of normal at pervious visit. He was asked to take sublingual Vitamin B12 1,000 mcg daily.     Previous Referral to Nephrology for CKD III. Appointment cancelled due to COVID concerns.  He has not yet had Nephrology follow up    EGD/Colonoscopy done 6/2020. EGD revealed gastritis with pathology positive for H. Pylori. Colonoscopy reveal polyps with unremarkable pathology, recommended repeat in 5 years    Continue B12 supplementation. D/c'd oral iron 12/2022 due to constipation. Interval decline in iron levels but remains wnl. Hg remains near baseline. Advised 3 month f/u given decline in iron studies. Patient opts to continue Q 6 monthly follow up. F/u 6 months with repeat labs. Discussed S&S to contact us sooner           Leukopenia     Intermittent mild neutropenia. No B symptoms         Sickle cell trait      -at least partial etiology of chronic mild anemia              Med Onc Chart Routing      Follow up with physician    Follow up with [facility] 6 months.   Infusion scheduling note    Injection scheduling note    Labs CBC, CMP, ferritin and iron and TIBC   Scheduling:  Preferred lab:  Lab interval:  1-2 days prior to appt   Imaging None      Pharmacy appointment No pharmacy appointment needed      Other referrals     No additional referrals needed             Plan:     Chronic anemia    Sickle cell trait    Leukopenia, unspecified type                Megha George, SANGEETHAP-C

## 2023-07-05 NOTE — ASSESSMENT & PLAN NOTE
Anemia multifactorial including CKD, chronic disease, sickle cell trait    Laboratory review overall stable. Hg 11.7, remains near baseline is 11-12 range. Anemia workup has been essentially unremarkable. Vitamin B12 on the lower end of normal at pervious visit. He was asked to take sublingual Vitamin B12 1,000 mcg daily.     Previous Referral to Nephrology for CKD III. Appointment cancelled due to COVID concerns.  He has not yet had Nephrology follow up    EGD/Colonoscopy done 6/2020. EGD revealed gastritis with pathology positive for H. Pylori. Colonoscopy reveal polyps with unremarkable pathology, recommended repeat in 5 years    Continue B12 supplementation. D/c'd oral iron 12/2022 due to constipation. Interval decline in iron levels but remains wnl. Hg remains near baseline. Advised 3 month f/u given decline in iron studies. Patient opts to continue Q 6 monthly follow up. F/u 6 months with repeat labs. Discussed S&S to contact us sooner

## 2023-07-06 ENCOUNTER — OFFICE VISIT (OUTPATIENT)
Dept: SLEEP MEDICINE | Facility: CLINIC | Age: 83
End: 2023-07-06
Payer: MEDICARE

## 2023-07-06 VITALS
HEART RATE: 71 BPM | HEIGHT: 75 IN | OXYGEN SATURATION: 100 % | RESPIRATION RATE: 18 BRPM | DIASTOLIC BLOOD PRESSURE: 80 MMHG | BODY MASS INDEX: 26.94 KG/M2 | WEIGHT: 216.69 LBS | SYSTOLIC BLOOD PRESSURE: 116 MMHG

## 2023-07-06 DIAGNOSIS — G47.52 CONTROLLED REM SLEEP BEHAVIOR DISORDER: ICD-10-CM

## 2023-07-06 DIAGNOSIS — R94.2 RESTRICTIVE VENTILATORY DEFECT: Primary | ICD-10-CM

## 2023-07-06 DIAGNOSIS — R94.2 DIFFUSION CAPACITY OF LUNG (DL), DECREASED: ICD-10-CM

## 2023-07-06 DIAGNOSIS — I15.2 HYPERTENSION ASSOCIATED WITH DIABETES: ICD-10-CM

## 2023-07-06 DIAGNOSIS — E78.2 COMBINED HYPERLIPIDEMIA ASSOCIATED WITH TYPE 2 DIABETES MELLITUS: Chronic | ICD-10-CM

## 2023-07-06 DIAGNOSIS — E11.59 HYPERTENSION ASSOCIATED WITH DIABETES: ICD-10-CM

## 2023-07-06 DIAGNOSIS — E11.69 COMBINED HYPERLIPIDEMIA ASSOCIATED WITH TYPE 2 DIABETES MELLITUS: Chronic | ICD-10-CM

## 2023-07-06 DIAGNOSIS — E11.51 TYPE 2 DIABETES MELLITUS WITH DIABETIC PERIPHERAL ANGIOPATHY WITHOUT GANGRENE, WITHOUT LONG-TERM CURRENT USE OF INSULIN: ICD-10-CM

## 2023-07-06 PROCEDURE — 3288F FALL RISK ASSESSMENT DOCD: CPT | Mod: HCNC,CPTII,S$GLB, | Performed by: INTERNAL MEDICINE

## 2023-07-06 PROCEDURE — 99999 PR PBB SHADOW E&M-EST. PATIENT-LVL III: ICD-10-PCS | Mod: PBBFAC,HCNC,, | Performed by: INTERNAL MEDICINE

## 2023-07-06 PROCEDURE — 1101F PT FALLS ASSESS-DOCD LE1/YR: CPT | Mod: HCNC,CPTII,S$GLB, | Performed by: INTERNAL MEDICINE

## 2023-07-06 PROCEDURE — 1159F MED LIST DOCD IN RCRD: CPT | Mod: HCNC,CPTII,S$GLB, | Performed by: INTERNAL MEDICINE

## 2023-07-06 PROCEDURE — 3079F DIAST BP 80-89 MM HG: CPT | Mod: HCNC,CPTII,S$GLB, | Performed by: INTERNAL MEDICINE

## 2023-07-06 PROCEDURE — 99214 PR OFFICE/OUTPT VISIT, EST, LEVL IV, 30-39 MIN: ICD-10-PCS | Mod: HCNC,S$GLB,, | Performed by: INTERNAL MEDICINE

## 2023-07-06 PROCEDURE — 3288F PR FALLS RISK ASSESSMENT DOCUMENTED: ICD-10-PCS | Mod: HCNC,CPTII,S$GLB, | Performed by: INTERNAL MEDICINE

## 2023-07-06 PROCEDURE — 99999 PR PBB SHADOW E&M-EST. PATIENT-LVL III: CPT | Mod: PBBFAC,HCNC,, | Performed by: INTERNAL MEDICINE

## 2023-07-06 PROCEDURE — 1159F PR MEDICATION LIST DOCUMENTED IN MEDICAL RECORD: ICD-10-PCS | Mod: HCNC,CPTII,S$GLB, | Performed by: INTERNAL MEDICINE

## 2023-07-06 PROCEDURE — 99499 UNLISTED E&M SERVICE: CPT | Mod: HCNC,S$GLB,, | Performed by: INTERNAL MEDICINE

## 2023-07-06 PROCEDURE — 1160F PR REVIEW ALL MEDS BY PRESCRIBER/CLIN PHARMACIST DOCUMENTED: ICD-10-PCS | Mod: HCNC,CPTII,S$GLB, | Performed by: INTERNAL MEDICINE

## 2023-07-06 PROCEDURE — 1101F PR PT FALLS ASSESS DOC 0-1 FALLS W/OUT INJ PAST YR: ICD-10-PCS | Mod: HCNC,CPTII,S$GLB, | Performed by: INTERNAL MEDICINE

## 2023-07-06 PROCEDURE — 99214 OFFICE O/P EST MOD 30 MIN: CPT | Mod: HCNC,S$GLB,, | Performed by: INTERNAL MEDICINE

## 2023-07-06 PROCEDURE — 99499 RISK ADDL DX/OHS AUDIT: ICD-10-PCS | Mod: HCNC,S$GLB,, | Performed by: INTERNAL MEDICINE

## 2023-07-06 PROCEDURE — 3074F SYST BP LT 130 MM HG: CPT | Mod: HCNC,CPTII,S$GLB, | Performed by: INTERNAL MEDICINE

## 2023-07-06 PROCEDURE — 3074F PR MOST RECENT SYSTOLIC BLOOD PRESSURE < 130 MM HG: ICD-10-PCS | Mod: HCNC,CPTII,S$GLB, | Performed by: INTERNAL MEDICINE

## 2023-07-06 PROCEDURE — 1160F RVW MEDS BY RX/DR IN RCRD: CPT | Mod: HCNC,CPTII,S$GLB, | Performed by: INTERNAL MEDICINE

## 2023-07-06 PROCEDURE — 3079F PR MOST RECENT DIASTOLIC BLOOD PRESSURE 80-89 MM HG: ICD-10-PCS | Mod: HCNC,CPTII,S$GLB, | Performed by: INTERNAL MEDICINE

## 2023-07-06 RX ORDER — ACETAMINOPHEN, DIPHENHYDRAMINE HCL, PHENYLEPHRINE HCL 325; 25; 5 MG/1; MG/1; MG/1
10 TABLET ORAL NIGHTLY
Qty: 30 TABLET | Refills: 11 | Status: SHIPPED | OUTPATIENT
Start: 2023-07-06 | End: 2024-03-20 | Stop reason: SDUPTHER

## 2023-07-06 NOTE — PROGRESS NOTES
Subjective:       Srinath Mcknight is a 82 y.o. male   Last visit was 07/26/2018  Has been doing overall well.  Five Points score 5. Recent revision hip Left  His major sleep issues a REM behavior disorder, PLMD and obstructive sleep apnea  With regard to obstructive sleep apnea and this is borderline patient has been reluctant to use CPAP in past.  REM behavioral events have been very infrequent less than once or twice in the month  He is stable on a regimen of clonidine 0.5 mg.  And melatonin   He is not taking Mirapex for his periodic limb movement  No cough no wheezing or shortness of breath  I have reviewed the patient's medical history in detail and updated the computerized patient record.    01/05/2022  Follow up visit to review tersts  C/o easily fatigue, DAMON   See cardiology , -ve w/u  Seen Dr Zimmer  ABG: Compensated resp alkalosis  6MWD:Reduced exercise capacity, 32.42%, Dyspnea wa grade 3, SpO2 100%, Peak BP and HR was 103 BPM, /64  PFT: Normal Spirometry:Mild restriction: DLCO mild reduced  Bed time:1130pm  Wake time 9 am  Appear to have stopped Klonopin and Melatonin while in hospital, says wife observed sleep reenactment activity    06/15/2022:  Last visit was 01/05/2022  Interested in Pul rehab: did not qualify last testing  No cough, No wheezing  SOB: frustrating after hip replacement and back surgery  PFT: low MVV and restriction  Not on Oxygen  Has GAYLA inhaler, not sure  regardinfg sleep issues  Stable on Klonopin and Melatonin  No reenactment    07/29/2022  Reviewed PFT and Walk  Improved from 32.4% to 44.21%  SNIFF was normal  Five Points 4  Accepted to pul rehab  PFT: restriction with reduced DLCO    02/08/2023  Last visit 07/29/2022  Snoring when sleep on backEpworth 7  Bed time 11 pm, wake time 11am  Taking Melatonin  No sleep related movements  Klonopin + Melatonin  No SOB,   Five Points 7  Has not walked out of bed recently  PSG 2017 reveiwed  Reluctant to wear CPAP in past   BMI increased  from 27.9 to 28  Increased snoring     03/30/2023  Last visit  Here to review PSG  AHI was 2.4/hr  Sleep talking was observed x 1  No RBD  .0/hr with 50 associated with arousal  Added Mirapex  No CHARLES   Snoring  with SpO 2 90%     07/06/2023  Followup  Frederic score 7  Bed time: 11: 30 pm  Wake time 10:30 am  RBD events in early Am 5-6 am, wake out of bed  Occurs 2-3 nights  Adherent with Mirapex, increased dose melatoni  Snoring at night sleeping on back and left side  Phamacy alerted him about risk of benzo in elderly  At this point in time caanot safely DC klonopin  Will increase melatonin to target max dose 20 mg  Discussed with patient medical neccessity and advised spouse to use spare bedroom( reluctant)  No ETOH    Previous Report(s) Reviewed: historical medical records and office notes     The following portions of the patient's history were reviewed and updated as appropriate:   He  has a past medical history of Anemia due to unknown mechanism (11/10/2015), Angina pectoris (2014), Arthritis, Back pain, Coronary artery disease, Diabetes mellitus with stage 3 chronic kidney disease (11/18/2020), DM (diabetes mellitus) (25-30 years), DM (diabetes mellitus), Encounter for blood transfusion, Gout (12/05/2017), History of blood transfusion, Hyperlipemia, Hypertension, CHARLES (obstructive sleep apnea), Polyneuropathy, Spinal cord disease, 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  He does not have any pertinent problems on file.  He  has a past surgical history that includes Rotator cuff repair (Left, 2006); Shoulder arthroscopy w/ rotator cuff repair (Right, 2009); Laminectomy (07/22/2016); Hernia repair (Bilateral, 04/18/2017); Joint replacement (Left, 10/09/2017); Back surgery (2019); lumbar foraminotomy (03/2018); left elbow (10/2017);  Revision total hip arthroplasty (Left, 9/11/2018); Esophagogastroduodenoscopy (N/A, 6/30/2020); Colonoscopy (N/A, 6/30/2020); and Injection of joint (Right, 4/14/2023).  His family history includes Cancer (age of onset: 50) in his brother; Diabetes in his father, mother, and sister; Drug abuse in his brother; Heart disease in his father and mother; Hypertension in his father and mother; Stroke in his father.  He  reports that he has never smoked. He has never used smokeless tobacco. He reports current alcohol use of about 1.0 standard drink per week. He reports that he does not use drugs.  He has a current medication list which includes the following prescription(s): albuterol, allopurinol, bisacodyl, blood glucose control, low, blood sugar diagnostic, true metrix air glucose meter, ciprofloxacin hcl, cyanocobalamin (vitamin b-12), fluticasone propionate, garlic extract, jardiance, lancets, levocetirizine, lexiscan, losartan, metformin, metoprolol succinate, mometasone 0.1%, moxifloxacin, multivitamin, papaverine, pfizer covid bival(12y up)(pf), pravastatin, prednisolone acetate, pregabalin, tamsulosin, cetirizine, clonazepam, melatonin, olopatadine, and pramipexole.  Current Outpatient Medications on File Prior to Visit   Medication Sig Dispense Refill    albuterol (VENTOLIN HFA) 90 mcg/actuation inhaler Inhale 2 puffs into the lungs every 6 (six) hours as needed for Wheezing or Shortness of Breath. Rescue 18 g 3    allopurinoL (ZYLOPRIM) 100 MG tablet TAKE 1 TABLET EVERY DAY 90 tablet 3    bisacodyl (DULCOLAX) 10 mg Supp   0    blood glucose control, low Soln by Misc.(Non-Drug; Combo Route) route.      blood sugar diagnostic (TRUE METRIX GLUCOSE TEST STRIP) Strp Use as directed to check sugars 100 strip 11    blood-glucose meter (TRUE METRIX AIR GLUCOSE METER) kit USE AS DIRECTED 1 each 0    ciprofloxacin HCl (CILOXAN) 0.3 % ophthalmic solution INSTILL 1 DROP IN RIGHT EYE TWICE DAILY FOR 1 WEEK THEN STOP       "cyanocobalamin, vitamin B-12, 1,000 mcg Subl Place 1,000 mcg under the tongue once daily. 90 tablet 3    fluticasone propionate (FLONASE) 50 mcg/actuation nasal spray 1 spray (50 mcg total) by Each Nostril route once daily. 16 mL 0    GARLIC EXTRACT ORAL Take 1 tablet by mouth once daily. Per Negin LIAO      JARDIANCE 10 mg tablet       lancets Misc Use as directed to check sugars 100 each 11    levocetirizine (XYZAL) 5 MG tablet Take 1 tablet (5 mg total) by mouth every evening. 30 tablet 11    LEXISCAN 0.4 mg/5 mL Syrg       losartan (COZAAR) 50 MG tablet Take 1 tablet (50 mg total) by mouth once daily. 90 tablet 3    metFORMIN (GLUCOPHAGE) 1000 MG tablet TAKE 1 TABLET TWICE DAILY WITH MEALS 180 tablet 3    metoprolol succinate (TOPROL-XL) 50 MG 24 hr tablet TAKE 1 TABLET EVERY DAY 90 tablet 3    mometasone 0.1% (ELOCON) 0.1 % cream Apply topically once daily. 50 g 2    moxifloxacin (VIGAMOX) 0.5 % ophthalmic solution       multivitamin (THERAGRAN) per tablet Take 1 tablet by mouth once daily.      papaverine 30 mg/mL injection Inject 0.3 ml of trimix solution prn ED max once daily  Prepare 10ml of trimix solution containing:    Papaverine 30mg/ml    Phentolamine 1 mg/ml    Alprostadil 10mcg/ml  Dispense 10ml per refill  Qs syringes 1cc/30g/0.5" and alcohol swabs dispense as needed for Intracavernosal injection 10 mL 5    PFIZER COVID BIVAL,12Y UP,,PF, 30 mcg/0.3 mL injection       pravastatin (PRAVACHOL) 40 MG tablet TAKE 1 TABLET EVERY DAY 90 tablet 2    prednisoLONE acetate (PRED FORTE) 1 % DrpS       pregabalin (LYRICA) 75 MG capsule Take 1-2 capsules ( mg total) by mouth every evening. 180 capsule 1    tamsulosin (FLOMAX) 0.4 mg Cap Take 1 capsule (0.4 mg total) by mouth once daily. 90 capsule 3    [DISCONTINUED] doxepin (SINEQUAN) 10 MG capsule TAKE 1 CAPSULE(10 MG) BY MOUTH EVERY EVENING 90 capsule 0    cetirizine (ZYRTEC) 10 MG tablet Take 1 tablet (10 mg total) by mouth every evening. for 14 " "days 14 tablet 0    clonazePAM (KLONOPIN) 0.5 MG tablet Take 1 tablet (0.5 mg total) by mouth every evening. 30 tablet 3    olopatadine (PATANOL) 0.1 % ophthalmic solution Place 1 drop into the right eye 2 (two) times daily. 5 mL 0    pramipexole (MIRAPEX) 0.125 MG tablet Take 1 tablet (0.125 mg total) by mouth every evening. 30 tablet 3     No current facility-administered medications on file prior to visit.     He is allergic to sulfa (sulfonamide antibiotics)..     Review of Systems   Constitutional:  Positive for malaise/fatigue.   Eyes: Negative.    Respiratory:          Snoring   Cardiovascular: Negative.    Genitourinary: Negative.    Musculoskeletal: Negative.    Neurological: Negative.    Endo/Heme/Allergies: Negative.    Psychiatric/Behavioral:  Positive for memory loss.    All other systems reviewed and are negative.      Objective:      Vitals:    07/06/23 1125   BP: 116/80   Pulse: 71   Resp: 18   SpO2: 100%   Weight: 98.3 kg (216 lb 11.4 oz)   Height: 6' 3" (1.905 m)           Using cane    Physical Exam  Vitals and nursing note reviewed.   Constitutional:       Appearance: He is well-developed.   HENT:      Head: Normocephalic and atraumatic.      Nose: Nose normal.   Eyes:      General:         Left eye: No discharge.      Pupils: Pupils are equal, round, and reactive to light.   Neck:      Vascular: No JVD.   Cardiovascular:      Rate and Rhythm: Normal rate and regular rhythm.      Heart sounds: Normal heart sounds. No murmur heard.  Pulmonary:      Effort: Pulmonary effort is normal. No respiratory distress.   Abdominal:      General: Bowel sounds are normal.      Palpations: Abdomen is soft.   Musculoskeletal:         General: No deformity. Normal range of motion.      Cervical back: Normal range of motion and neck supple.   Skin:     General: Skin is warm and dry.   Neurological:      Mental Status: He is alert and oriented to person, place, and time.      Deep Tendon Reflexes: Reflexes are " "normal and symmetric.     PATIENT: Srinath Mcknight  STUDY DATE: 3/6/2023  REFERRING PHYSICIAN: aMrtin Machuca MD     INDICATIONS FOR POLYSOMNOGRAPHY: The patient is a 82 year year old Male who is 6' 3" and weighs 216.0 lbs.  His BMI equals 27.1.  A full night polysomnogram was performed to evaluate for -. [place] 7.Known CHARLES, reluctant t wear CPAP. RBD on Klonopin and melatonin. AHI 6.1/hr 38 events.     POLYSOMNOGRAM DATA:  A full night polysomnogram recorded the standard physiologic parameters including EEG, EOG, EMG, EKG, nasal and oral airflow.  Respiratory parameters of chest and abdominal movements were recorded with [place]-Crystal motion transducers.  Oxygen saturation was recorded by pulse oximetry.       SLEEP ARCHITECTURE:  The total recording time of the polysomnogram was 416.0 minutes.  The total sleep time was 369.5 minutes.  The patient spent 8.5% of total sleep time in Stage N1, 80.8% in Stage N2, 0.0% in Stages N3 and N, and 10.7% in REM.   Sleep latency was 12.9 minutes.  REM latency was 100.5 minutes.  Sleep Efficiency was 88.8%.  Sleep Maintenance Efficiency was 90.9%.  Total wake time was 47.0 minutes for a total wake percentage of 8.4%.     RESPIRATORY EVENTS:  The polysomnogram revealed a presence of 2 obstructive, 3 central, and - mixed apneas resulting in an Apnea index of 0.8 events per hour.  There were 10 hypopneas (using 3% desaturation criteria) resulting in a Hypopnea index of 1.6 events per hour.  The combined Apnea/Hypopnea index (using 3% desaturation criteria for Hypopneas) was 2.4 events per hour.     Baseline oxygen saturation was 93.8%.  The lowest oxygen saturation was 90.0%.       LIMB ACTIVITY:  There were 665 limb movements recorded.  Of this total, 665 were classified as PLMs.  Of the PLMs, 50 were associated with arousals.  The Limb Movement index was 108.0 per hour while the PLM index was 108.0 per hour.     CARDIAC SUMMARY:   The average pulse rate was 63.8 bpm.  " The minimum pulse rate was 36.0 bpm while the maximum pulse rate was 79.0 bpm.               CONCLUSION:  Overall AHI was 2.4/hr  SpO2 lamine 90%  Sleep talking was observed   Limb movements: 665. 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   Continue current regime: Melatonin and Klonopin+Mirapex  DC Doxepin    Requested Prescriptions     Signed Prescriptions Disp Refills    melatonin 10 mg Tab 30 tablet 11     Sig: Take 1 tablet (10 mg total) by mouth every evening.         Requested Prescriptions     Signed Prescriptions Disp Refills    melatonin 10 mg Tab 30 tablet 11     Sig: Take 1 tablet (10 mg total) by mouth every evening.         Follow up in about 3 months (around 10/6/2023), or virtual visit, Cont Melatonin, Clonazepam, Mirapex.    This note was prepared using voice recognition system and is likely to have sound alike errors that may have been overlooked even after proof reading.  Please call me with any questions    Discussed diagnosis, its evaluation, treatment and usual course. All questions answered.    Thank you for the courtesy of participating in the care of this patient    Martin Machuca MD

## 2023-07-12 LAB
LEFT EYE DM RETINOPATHY: NEGATIVE
RIGHT EYE DM RETINOPATHY: POSITIVE

## 2023-07-14 ENCOUNTER — PATIENT OUTREACH (OUTPATIENT)
Dept: ADMINISTRATIVE | Facility: HOSPITAL | Age: 83
End: 2023-07-14
Payer: MEDICARE

## 2023-07-17 ENCOUNTER — OFFICE VISIT (OUTPATIENT)
Dept: PAIN MEDICINE | Facility: CLINIC | Age: 83
End: 2023-07-17
Payer: MEDICARE

## 2023-07-17 VITALS
HEIGHT: 75 IN | BODY MASS INDEX: 27.41 KG/M2 | DIASTOLIC BLOOD PRESSURE: 74 MMHG | WEIGHT: 220.44 LBS | SYSTOLIC BLOOD PRESSURE: 145 MMHG | HEART RATE: 69 BPM | RESPIRATION RATE: 17 BRPM

## 2023-07-17 DIAGNOSIS — G89.29 CHRONIC PAIN OF RIGHT KNEE: Primary | ICD-10-CM

## 2023-07-17 DIAGNOSIS — M25.561 CHRONIC PAIN OF RIGHT KNEE: Primary | ICD-10-CM

## 2023-07-17 PROCEDURE — 3288F FALL RISK ASSESSMENT DOCD: CPT | Mod: HCNC,CPTII,S$GLB, | Performed by: PHYSICIAN ASSISTANT

## 2023-07-17 PROCEDURE — 1160F RVW MEDS BY RX/DR IN RCRD: CPT | Mod: HCNC,CPTII,S$GLB, | Performed by: PHYSICIAN ASSISTANT

## 2023-07-17 PROCEDURE — 1125F AMNT PAIN NOTED PAIN PRSNT: CPT | Mod: HCNC,CPTII,S$GLB, | Performed by: PHYSICIAN ASSISTANT

## 2023-07-17 PROCEDURE — 3077F PR MOST RECENT SYSTOLIC BLOOD PRESSURE >= 140 MM HG: ICD-10-PCS | Mod: HCNC,CPTII,S$GLB, | Performed by: PHYSICIAN ASSISTANT

## 2023-07-17 PROCEDURE — 3077F SYST BP >= 140 MM HG: CPT | Mod: HCNC,CPTII,S$GLB, | Performed by: PHYSICIAN ASSISTANT

## 2023-07-17 PROCEDURE — 1159F MED LIST DOCD IN RCRD: CPT | Mod: HCNC,CPTII,S$GLB, | Performed by: PHYSICIAN ASSISTANT

## 2023-07-17 PROCEDURE — 3078F DIAST BP <80 MM HG: CPT | Mod: HCNC,CPTII,S$GLB, | Performed by: PHYSICIAN ASSISTANT

## 2023-07-17 PROCEDURE — 1159F PR MEDICATION LIST DOCUMENTED IN MEDICAL RECORD: ICD-10-PCS | Mod: HCNC,CPTII,S$GLB, | Performed by: PHYSICIAN ASSISTANT

## 2023-07-17 PROCEDURE — 3288F PR FALLS RISK ASSESSMENT DOCUMENTED: ICD-10-PCS | Mod: HCNC,CPTII,S$GLB, | Performed by: PHYSICIAN ASSISTANT

## 2023-07-17 PROCEDURE — 1160F PR REVIEW ALL MEDS BY PRESCRIBER/CLIN PHARMACIST DOCUMENTED: ICD-10-PCS | Mod: HCNC,CPTII,S$GLB, | Performed by: PHYSICIAN ASSISTANT

## 2023-07-17 PROCEDURE — 1101F PR PT FALLS ASSESS DOC 0-1 FALLS W/OUT INJ PAST YR: ICD-10-PCS | Mod: HCNC,CPTII,S$GLB, | Performed by: PHYSICIAN ASSISTANT

## 2023-07-17 PROCEDURE — 99214 PR OFFICE/OUTPT VISIT, EST, LEVL IV, 30-39 MIN: ICD-10-PCS | Mod: HCNC,S$GLB,, | Performed by: PHYSICIAN ASSISTANT

## 2023-07-17 PROCEDURE — 99999 PR PBB SHADOW E&M-EST. PATIENT-LVL V: CPT | Mod: PBBFAC,HCNC,, | Performed by: PHYSICIAN ASSISTANT

## 2023-07-17 PROCEDURE — 1101F PT FALLS ASSESS-DOCD LE1/YR: CPT | Mod: HCNC,CPTII,S$GLB, | Performed by: PHYSICIAN ASSISTANT

## 2023-07-17 PROCEDURE — 99999 PR PBB SHADOW E&M-EST. PATIENT-LVL V: ICD-10-PCS | Mod: PBBFAC,HCNC,, | Performed by: PHYSICIAN ASSISTANT

## 2023-07-17 PROCEDURE — 99214 OFFICE O/P EST MOD 30 MIN: CPT | Mod: HCNC,S$GLB,, | Performed by: PHYSICIAN ASSISTANT

## 2023-07-17 PROCEDURE — 1125F PR PAIN SEVERITY QUANTIFIED, PAIN PRESENT: ICD-10-PCS | Mod: HCNC,CPTII,S$GLB, | Performed by: PHYSICIAN ASSISTANT

## 2023-07-17 PROCEDURE — 3078F PR MOST RECENT DIASTOLIC BLOOD PRESSURE < 80 MM HG: ICD-10-PCS | Mod: HCNC,CPTII,S$GLB, | Performed by: PHYSICIAN ASSISTANT

## 2023-07-17 RX ORDER — CARBOXYMETHYLCELLULOSE SODIUM AND GLYCERIN 5; 9 MG/ML; MG/ML
SOLUTION/ DROPS OPHTHALMIC
COMMUNITY
Start: 2023-07-13 | End: 2023-12-18

## 2023-07-17 RX ORDER — KETOROLAC TROMETHAMINE 5 MG/ML
1 SOLUTION OPHTHALMIC 4 TIMES DAILY
COMMUNITY
Start: 2023-07-13 | End: 2023-12-18

## 2023-07-17 NOTE — PROGRESS NOTES
Established Patient Chronic Pain Note (Follow-up Visit)    Referring Physician: Yeison Wilder MD    PCP: Yeison Wilder MD    Chief Complaint:   Chief Complaint   Patient presents with    Leg Pain     RIGHT     Knee Pain     Right      Low-back Pain - improved    Knee Pain    RIGHT         Interval History (7/17/2023):  Srinath Mcknight presents today for follow-up visit.  Patient was last seen on 5/11/2023. Pain in right knee has returned; it has only been about 3 months since viscosupplementation. Patient reports pain as 7/10 today.  He has been going to the gym.  He also reports right hand pain.  He saw Dr. Douglas in the past and had an injection, and the pain has returned.    Interval History (5/11/2023): Srinath Mcknight presents today for follow-up visit.  he underwent right knee Synvisc-One injection on 4/14/23 (1 month ago).  The patient reports that he is/was better following the procedure.  he reports 50% pain relief.     The changes have continued through this visit.  Patient reports pain as 8/10 today.  His main complaint today is right groin pain.  He has never had any prior treatment of his right hip in the past.    Interval History (3/23/2023):  Srinath Mcknight presents today for follow-up visit.  Patient was last seen on 2/16/2023. At that visit, the plan was to start Lyrica, only talking QHS, which seems to be helping. Patient reports pain as 6/10 today.  His main complaint today is right knee pain.  He had a right knee steroid injection with Dr. Winters on 11/09/2022 with about 3-4 months pain relief.  This was the 1st treatment he has had for his right knee in the past.  Does not have any left knee pain.  Continues to do at home physical therapy exercises, which she feels is helping his back pain.  Overall, stable; pain is better controlled than it was at the last visit.    Initial HPI (2/16/2023):  Srinath Mcknight is a 82 y.o. male with past medical history significant for hypertension,  hyperlipidemia, type 2 diabetes, coronary artery disease, urinary incontinence, sickle cell trait, stage 3 chronic kidney disease, gout, obstructive sleep apnea, history of L3-4 lumbar fusion in L4-5 lumbar laminectomy who presents to the clinic for the evaluation of lower back pain.  Of note patient reports 3 prior back surgeries:  The initial several years prior in the left lower back at [facility], and 2 lower back surgeries with Dr. Iqbal within the last 2 years.  Today patient reports pain which is constant which is rated an 8/5.  Pain is described as aching in nature.  Patient reports pain in a bandlike distribution in the lower back without radiation into the buttocks or lower extremities.  Patient does report chronic right-sided knee pain and history of total left hip arthroplasty.  Pain is exacerbated 1st thing in the morning when arising, with lumbar extension as well as with ambulation.  Patient reports he is able to ambulate only a few steps before requiring rest.  Pain is improved with lumbar support as well as sitting.  Patient also reports receiving [facility] without any meaningful relief.  Patient has completed conventional physical therapy last year and continues physician directed physical therapy exercises at home without meaningful relief.  Patient has been taking gabapentin 600 mg once daily without improvement in his pain.  Patient does endorse weakness in the lower extremities associated with his pain.  Patient reports significant motor weakness.  Patient denies night fever/night sweats, urinary incontinence, bowel incontinence, significant weight loss, and loss of sensations.    Pain Disability Index Review:  Last 3 PDI Scores 7/17/2023 2/16/2023 3/20/2018   Pain Disability Index (PDI) 42 30 49       Non-Pharmacologic Treatments:  Physical Therapy/Home Exercise: yes  Ice/Heat:no  TENS: no  Acupuncture: no  Massage:  no  Chiropractic: no    Other: yes; sx x 3: [facility], Dr. Iqbal x 2      Pain Medications:  - Opioids: Percocet (Oxycodone/Acetaminophen)  - Adjuvant Medications: Clonazepam (Klonopin), Neurontin (Gabapentin), and Prednisone (Deltasone)    Relevant sx:  - 3 prior back surgeries:  [facility], and 2 lower back surgeries with Dr. Iqbal (March 2018: L3-L4 L5 decompression/laminotomy/micro dissection; August 2019: L3-4 revision with cage insertion and laminectomy/diskectomy)  - total left hip arthroplasty (total of 2 surgeries)    Pain Procedures:   [facility]    Dr. Brooks:  -right knee Synvisc-One injection on 4/14/23 with 50% pain relief    Other providers:  -cervical medial branch facet injections in 2020 with Dr. Linares with subsequent RFA  -02/15/2018: Left-sided sacroiliac joint injection; Dr. Roldan  -01/24/2018: Left-sided L4/5 and L5/S1 transforaminal epidural steroid injection; Dr. Roldan  -06/14/2016: Left L3-4 facet joint injection; Dr. Hutchinson  -05/18/2016: Left hip intra-articular injection posterior approach; Dr. Madera    Orthopedics:  -right knee steroid injection with Dr. Winters on 11/09/2022 with about 3-4 months pain relief  - right wrist injection in May 2021 with Dr. Douglas     Review of Systems:  GENERAL:  No weight loss, malaise or fevers.  HEENT:   No recent changes in vision or hearing  NECK:  Negative for lumps, no difficulty with swallowing.  RESPIRATORY:  Negative for cough, wheezing or shortness of breath, patient denies any recent URI.  CARDIOVASCULAR:  Negative for chest pain or palpitations.  GI:  Negative for abdominal discomfort, blood in stools or black stools or change in bowel habits.  MUSCULOSKELETAL:  See HPI.  SKIN:  Negative for lesions, rash, and itching.  PSYCH:  No mood disorder or recent psychosocial stressors.   HEMATOLOGY/LYMPHOLOGY:  Negative for prolonged bleeding, bruising easily or swollen nodes.    NEURO:   No history of syncope, paralysis,  "seizures or tremors.  All other reviewed and negative other than HPI.        OBJECTIVE:    Physical Exam:  Vitals:    07/17/23 0926   BP: (!) 145/74   Pulse: 69   Resp: 17   Weight: 100 kg (220 lb 7.4 oz)   Height: 6' 3" (1.905 m)   PainSc:   7    Body mass index is 27.56 kg/m².   (reviewed on 7/17/2023)    GENERAL: Well appearing, in no acute distress, alert and oriented x3.  PSYCH:  Mood and affect appropriate.  SKIN: Skin color, texture, turgor normal, no rashes or lesions.  HEAD/FACE:  Normocephalic, atraumatic.    PULM: No evidence of respiratory difficulty, symmetric chest rise.  GI: no obvious distention.    BACK:  Positive shopping cart sign.  Well-healed 5 in lower lumbar vertical incision.  SLR is negative to radicular pain. No pain to palpation over the facet joints of the lumbar spine or spinous processes.  Reduced range of motion with pain reproduction.  EXTREMITIES:  Peripheral joint range of motion is reduced with pain with instability noted bilateral lower extremities. No deformities, edema, or skin discoloration. Right wrist: TTP over medial wrist.  MUSCULOSKELETAL: Able to stand on heels but not toes secondary to history of broken toes.     There is no pain with palpation over the sacroiliac joints bilaterally.  Gaenslen's, Distraction/Compression.  Pain with internal/external rotation of right hip.  Fabere's positive on the right.  Facet loading test is negative bilaterally.      Right Knee: pain with extension and flexion. TTP diffusely. Crepitus Present. No pain on left.    BUE & BLE strength is normal and symmetric.  No atrophy or tone abnormalities are noted.    RIGHT Lower extremity: Hip flexion 5/5, Hip Abduction 5/5, Hip Adduction 5/5, Knee extension 5/5, Knee flexion 5/5, Ankle dorsiflexion5/5, Extensor hallucis longus 5/5, Ankle plantarflexion 5/5  LEFT Lower extremity:  Hip flexion 5/5, Hip Abduction 5/5,Hip Adduction 5/5, Knee extension 5/5, Knee flexion 5/5, Ankle dorsiflexion 5/5, " Extensor hallucis longus 5/5, Ankle plantarflexion 5/5  -Normal testing knee (patellar) jerk and ankle (achilles) jerk    NEURO: BUE & BLE coordination and muscle stretch reflexes are physiologic and symmetric. No loss of sensation is noted.  GAIT: normal.      Imaging (Reviewed on 7/17/2023):    05/11/21 X-ray Knee Ortho Bilateral with Flexion  COMPARISON:  December 14, 2017  FINDINGS:  Bilateral tricompartment degenerative changes.  There is increased narrowing medial compartments.  No fracture, dislocation or effusions.  Soft tissues within normal limits.  Impression  Bilateral degenerative change without fracture or dislocation.     12/20/17 MRI Lumbar Spine W  WO Contrast    FINDINGS:  Since the previous study there has been a laminectomy at L4-L5. There is enhancement of granulation tissue at the operative site.  No abnormal enhancement surrounds the exiting nerve roots.  The thecal sac measures 9 mm AP at this level.  There is slight mass effect upon the lateral recess without definite compression of the descending L5 nerve roots. There is a synovial cyst with peripheral enhancement projecting centrally and inferiorly from the left facet joint at L3-L4 that demonstrates peripheral enhancement.  It causes mild spinal stenosis at the level of L4 and is slightly larger than on the previous study from May 2016. The conus medullaris terminates at the level ofL1-L2. No abnormal signal within the conus. Intervertebral disc levels are as follows:    T12-L1 disc: No significant disc pathology. No spinal canal or neural foraminal stenosis.    L1-L2 disc : No significant disc pathology. No spinal canal or neural foraminal stenosis.    L2-L3 disc: Normal disc height with mild degenerative facet changes and thickening of ligamentum flavum.  No significant canal or foraminal stenosis.    L3-L4 disc: Partial distal desiccation with a broad-based posterior disc bulge.  There is a far left lateral annular fissure of the disc.   Moderate to severe degenerative facet change with bilateral facet joint effusions.  The thecal sac measures 10 mm AP but is about 50% of normal cross-sectional area.  Disc material bulges into the floor of the left exit foramen and causes mild to moderate left foraminal stenosis.  There is minimal right foraminal stenosis.  Additionally, there is a synovial cyst projects centrally and inferiorly from the left facet joint.  The synovial cyst demonstrates peripheral enhancement and measures 11 mm craniocaudal by 11 mm transverse by 5 mm AP.  It is best demonstrated on axial T2 series 6 image 23.  It causes mild thecal sac stenosis without definite neural compression.  There is a smaller synovial cyst projecting toward the left as well, remote from any neural structures.    L4-L5 disc: Level of interval laminectomy.  There is enhancing granulation tissue at the operative site without enhancement surrounding the exiting nerve roots.  The thecal sac measures 9 mm AP.  There is mild mass effect upon the lateral recesses without definite compression no descending L5 nerve roots.  Minimal bilateral foraminal stenosis.    L5-S1 disc: Partial does desiccation and disc space height loss.  Height loss is most severe toward the left where there are marginal osteophytes.  Osteophyte material encroaches into the floor of the left exit foramen causing moderate to severe left foraminal stenosis.  The right neural foramen is minimally narrowed.  Moderate degenerative facet change.  The thecal sac measures 12 mm AP.  IMPRESSION:      1.  There has been an interval laminectomy at L4-L5.  There is enhancing granulation tissue at the operative site.  There is diminished spinal stenosis at this level compared to the previous exam.    2.  Enhancement surrounds a synovial cyst that projects centrally and inferiorly from the left L3-L4 facet joint and causes mild spinal stenosis.  The cyst has increased in size compared to the previous  examination.    3.  There is mild to moderate spinal stenosis at L3-L4 where the thecal sac is about 50% of normal cross-sectional area, predominantly related to hypertrophy of the posterior elements.    4.  There is a far left lateral annular fissure of L3-L4 disc.    5.  Moderate to severe left-sided foraminal stenosis at L5-S1.       11/14/22 X-Ray Lumbar Spine AP And Lateral  FINDINGS:  Prior posterior fusion of L3-L4 with interbody spacer.  Normal alignment.  No evidence to suggest hardware failure.  Remaining lumbar vertebral bodies are normal in height and alignment.  There is mild to moderate multilevel degenerative disc disease most pronounced at L5-S1.  SI joints are intact.       11/18/19 X-Ray Cervical Spine AP And Lateral  FINDINGS:  There is visualization of C7-T1 disc level on the lateral view.  Interval progression multilevel degenerative changes throughout the C-spine.  Anterior and posterior marginal spurring with concomitant varying degrees of loss of disc height throughout the C-spine.  No acute fracture or subluxation.  C1-2 articulation appears within normal limits allowing for rotation.  Impression  Interval progression multilevel cervical spondylosis without acute fracture or subluxation.  Follow-up and/or further evaluation as warranted.        ASSESSMENT: 82 y.o. year old male with lower back pain, consistent with     1. Chronic pain of right knee  Case Request-RAD/Other Procedure Area: Right Genicular nerve block w/RN IV Sedation            PLAN:   - Interventions:    - Schedule right genicular nerve block.  Could also consider RFA in the future. Patient is not taking prescription blood thinners or ASA.    - S/p right knee Synvisc-One injection on 4/14/23 with 50% pain relief. Previously had right knee steroid injection with Dr. Winters on 11/09/2022 with about 3-4 months pain relief.  - Consider right hip injection. No previous tx.  - We have previously discussed considering a lumbar TF  GAEL  vs caudal GAEL.  We will 1st review most recent MRI; will hold off for now since low back pain stable. Explained the risks and benefits of the procedure in detail with the patient previously in clinic along with alternative treatment options.    - Anticoagulation use: no no anticoagulation    - Medications:  - Refill Lyrica 75mg-150mg QHS for neuropathic pain - started at previous visit, which he felt was helping. X 90 day supply. We previously discussed potential side effects of this medication which may include drowsiness,dizziness, dry mouth, constipation or peripheral edema. Can Increase to 150mg QHS if needed in the future.     report:  Reviewed and consistent with medication use as prescribed.    - Therapy: We have previously discussed independently continuing learned exercises learned at physical therapy to help manage the patient/s painful condition. The patient was previously counseled that muscle strengthening will improve the long term prognosis in regards to pain and may also help increase range of motion and mobility.  He continues learned at home physical therapy exercises religiously, as he does not feel he got any added benefit from going to outpatient therapy. Continue going to gym.     -Imaging:   - Most recent lumbar MRI reviewed previously (2019).  - Consider ordering hip x-ray if patient decides to move forward with intervention.     - Records: Extensive record review completed from Dr. Iqbal: [facility] [place] -- reviewed today and added to the patient's chart.    - Refer:   - Follow-up with Dr. Douglas for right hand pain; had right wrist injection in 5/2021 with good pain relief.  - Consider Orthopedics follow-up for knee pain (Dr. Winters).   - Consider referral to Dr. Schuler for right hip pain, although patient wants to avoid sx.     - Follow up visit: 4 weeks post-procedure     - This condition does not require this patient to take time off of work, and the primary goal  of our Pain Management services is to improve the patient's functional capacity.   - I discussed the risks, benefits, and alternatives to potential treatment options. All questions and concerns were fully addressed today in clinic.         Leatha Franks PA-C  Interventional Pain Management - [facility]    Disclaimer:  This note was prepared using voice recognition system and is likely to have sound alike errors that may have been overlooked even after proof reading.  Please call me with any questions.

## 2023-07-17 NOTE — PATIENT INSTRUCTIONS
If this helps but doesn't last, we will consider genicular nerve RFA (Radiofrequency ablation) for longer lasting pain relief.

## 2023-07-18 ENCOUNTER — OFFICE VISIT (OUTPATIENT)
Dept: CARDIOLOGY | Facility: CLINIC | Age: 83
End: 2023-07-18
Payer: MEDICARE

## 2023-07-18 VITALS
DIASTOLIC BLOOD PRESSURE: 62 MMHG | HEART RATE: 70 BPM | SYSTOLIC BLOOD PRESSURE: 130 MMHG | WEIGHT: 222 LBS | BODY MASS INDEX: 27.6 KG/M2 | OXYGEN SATURATION: 98 % | HEIGHT: 75 IN

## 2023-07-18 DIAGNOSIS — I70.0 AORTIC CALCIFICATION: ICD-10-CM

## 2023-07-18 DIAGNOSIS — I15.2 HYPERTENSION ASSOCIATED WITH DIABETES: Primary | ICD-10-CM

## 2023-07-18 DIAGNOSIS — E78.2 COMBINED HYPERLIPIDEMIA ASSOCIATED WITH TYPE 2 DIABETES MELLITUS: ICD-10-CM

## 2023-07-18 DIAGNOSIS — R06.09 DYSPNEA ON EXERTION: ICD-10-CM

## 2023-07-18 DIAGNOSIS — I70.0 ATHEROSCLEROSIS OF AORTA: ICD-10-CM

## 2023-07-18 DIAGNOSIS — M47.26 OSTEOARTHRITIS OF SPINE WITH RADICULOPATHY, LUMBAR REGION: ICD-10-CM

## 2023-07-18 DIAGNOSIS — E11.42 TYPE 2 DIABETES MELLITUS WITH DIABETIC POLYNEUROPATHY, WITHOUT LONG-TERM CURRENT USE OF INSULIN: ICD-10-CM

## 2023-07-18 DIAGNOSIS — E11.69 COMBINED HYPERLIPIDEMIA ASSOCIATED WITH TYPE 2 DIABETES MELLITUS: ICD-10-CM

## 2023-07-18 DIAGNOSIS — E11.59 HYPERTENSION ASSOCIATED WITH DIABETES: Primary | ICD-10-CM

## 2023-07-18 PROCEDURE — 99214 OFFICE O/P EST MOD 30 MIN: CPT | Mod: S$GLB,,, | Performed by: STUDENT IN AN ORGANIZED HEALTH CARE EDUCATION/TRAINING PROGRAM

## 2023-07-18 PROCEDURE — 3288F FALL RISK ASSESSMENT DOCD: CPT | Mod: CPTII,S$GLB,, | Performed by: STUDENT IN AN ORGANIZED HEALTH CARE EDUCATION/TRAINING PROGRAM

## 2023-07-18 PROCEDURE — 3078F DIAST BP <80 MM HG: CPT | Mod: CPTII,S$GLB,, | Performed by: STUDENT IN AN ORGANIZED HEALTH CARE EDUCATION/TRAINING PROGRAM

## 2023-07-18 PROCEDURE — 3075F PR MOST RECENT SYSTOLIC BLOOD PRESS GE 130-139MM HG: ICD-10-PCS | Mod: CPTII,S$GLB,, | Performed by: STUDENT IN AN ORGANIZED HEALTH CARE EDUCATION/TRAINING PROGRAM

## 2023-07-18 PROCEDURE — 1159F PR MEDICATION LIST DOCUMENTED IN MEDICAL RECORD: ICD-10-PCS | Mod: CPTII,S$GLB,, | Performed by: STUDENT IN AN ORGANIZED HEALTH CARE EDUCATION/TRAINING PROGRAM

## 2023-07-18 PROCEDURE — 1126F AMNT PAIN NOTED NONE PRSNT: CPT | Mod: CPTII,S$GLB,, | Performed by: STUDENT IN AN ORGANIZED HEALTH CARE EDUCATION/TRAINING PROGRAM

## 2023-07-18 PROCEDURE — 99214 PR OFFICE/OUTPT VISIT, EST, LEVL IV, 30-39 MIN: ICD-10-PCS | Mod: S$GLB,,, | Performed by: STUDENT IN AN ORGANIZED HEALTH CARE EDUCATION/TRAINING PROGRAM

## 2023-07-18 PROCEDURE — 3078F PR MOST RECENT DIASTOLIC BLOOD PRESSURE < 80 MM HG: ICD-10-PCS | Mod: CPTII,S$GLB,, | Performed by: STUDENT IN AN ORGANIZED HEALTH CARE EDUCATION/TRAINING PROGRAM

## 2023-07-18 PROCEDURE — 99999 PR PBB SHADOW E&M-EST. PATIENT-LVL IV: CPT | Mod: PBBFAC,HCNC,, | Performed by: STUDENT IN AN ORGANIZED HEALTH CARE EDUCATION/TRAINING PROGRAM

## 2023-07-18 PROCEDURE — 3075F SYST BP GE 130 - 139MM HG: CPT | Mod: CPTII,S$GLB,, | Performed by: STUDENT IN AN ORGANIZED HEALTH CARE EDUCATION/TRAINING PROGRAM

## 2023-07-18 PROCEDURE — 1126F PR PAIN SEVERITY QUANTIFIED, NO PAIN PRESENT: ICD-10-PCS | Mod: CPTII,S$GLB,, | Performed by: STUDENT IN AN ORGANIZED HEALTH CARE EDUCATION/TRAINING PROGRAM

## 2023-07-18 PROCEDURE — 3288F PR FALLS RISK ASSESSMENT DOCUMENTED: ICD-10-PCS | Mod: CPTII,S$GLB,, | Performed by: STUDENT IN AN ORGANIZED HEALTH CARE EDUCATION/TRAINING PROGRAM

## 2023-07-18 PROCEDURE — 99999 PR PBB SHADOW E&M-EST. PATIENT-LVL IV: ICD-10-PCS | Mod: PBBFAC,HCNC,, | Performed by: STUDENT IN AN ORGANIZED HEALTH CARE EDUCATION/TRAINING PROGRAM

## 2023-07-18 PROCEDURE — 1101F PT FALLS ASSESS-DOCD LE1/YR: CPT | Mod: CPTII,S$GLB,, | Performed by: STUDENT IN AN ORGANIZED HEALTH CARE EDUCATION/TRAINING PROGRAM

## 2023-07-18 PROCEDURE — 1101F PR PT FALLS ASSESS DOC 0-1 FALLS W/OUT INJ PAST YR: ICD-10-PCS | Mod: CPTII,S$GLB,, | Performed by: STUDENT IN AN ORGANIZED HEALTH CARE EDUCATION/TRAINING PROGRAM

## 2023-07-18 PROCEDURE — 1159F MED LIST DOCD IN RCRD: CPT | Mod: CPTII,S$GLB,, | Performed by: STUDENT IN AN ORGANIZED HEALTH CARE EDUCATION/TRAINING PROGRAM

## 2023-07-18 NOTE — PROGRESS NOTES
Section of Cardiology                  Cardiac Clinic Note    Chief Complaint/Reason for consultation:  Decreased exercise tolerance      HPI:   Srinath Mcknight is a 82 y.o. male with h/o hypertension, diabetes, DJD, HLD, CAD, CKD who is here for follow-up.    11/18/20  Seen by Dr. Cobos.  Patient complained of 2-4 weeks of increased fatigue and dyspnea on exertion with moderate exertion.  Denies dyspnea at rest, orthopnea, PND, chest pain, palpitations, lower extremity edema.  For possible diastolic dysfunction, his losartan was increased to 50 mg b.i.d. and started on empagliflozin (jardiance) 10 mg daily.    9/15/21  He presents today c/o DAMON. Started about 6-7 months ago. Has been fairly stable. Says he can walk to the mailbox without SOB, but becomes SOB when he walks back. Says he can't exercise due to pain and SOB. Reports intermittent cough at times with some sputum production. Mild LE swelling. Sleeps on 1 pillow and denies orthopnea, PND. Reports occasional dizziness when he stands from a seated position.     He ambulates with a cane due to multiple surgeries on his back and hip. He is able to perform all of his ADLs. Denies syncope, palpitations, chest pain, fever, vomiting, blurry vision. Denies tobacco use, ETOH use, illicit drug use.       10/12/21  He reports feeling well. He is still getting short of breath with exertion. Does not get SOB at rest.  Denies chest pain, syncope, palpitations, PND, orthopnea. He has never smoked but has been around others who have smoked. He has been trying to exercise but gets short-winded within 5 minutes and would have to stop.         4/14/22  Still getting SOB. Has to stop to catch his breath. Able to walk a little further than before  Denies syncope, but feels that he has balancing issues. Denies tinnitus. Occurs when getting up from seated position    Does not want to wear the CPAP at night  Sees pulmonary, did not qualify for pulmonary rehab      Denies chest pain, syncope, orthopnea, LE swelling, palpitations, chest pain, fever, vomiting.         10/14/22  DAMON is improving  Reports low BP, decreased losartan to daily  Getting injections in knee and back   Doing well   Ambulates with cane, right leg weakness     Denies chest pain, syncope, orthopnea, LE swelling, palpitations      4/18/23  SOB mostly after eating, especially heavy meals  Knee injections are improving his pain  BP stable  Still has issues with balancing, uses cane regularly  Denies falls      Denies chest pain, syncope, orthopnea, LE swelling, palpitations        5/23/23  Comes in with wife  Dizziness with standing- ongoing for some time, thinks getting worse  Orthostatics in clinic normal  Uses a cane for this reason   No ear ringing or pain  BP elevated here and home  Losartan was 1/2'd recently- can't tell if symptoms improved  Has not fallen       7/18/23  No falling  Still having some dizziness, improved after holding flomax  Exercising at the gym, helps his SOB  Gym about twice a week, sometimes 3 times  No chest pain  Weight going up about 4 lbs        EKG 4/17/23 NSR, TWI inferolateral leads  EKG 4/14/22 NSR, T-wave abnormality, consider inferolateral ischemia, 74 bpm,   EKG 8/26/21  NSR, T-wave abnormality, consider inferolateral ischemia  EKG 9/15/21 NSR, T-wave abnormality, inferolateral ischemia, 78 bpm,  ms, QTc 401 ms     ECHO 10/12/21  The left ventricle is normal in size with concentric remodeling and normal systolic function.  Normal left ventricular diastolic function.  Normal right ventricular size with normal right ventricular systolic function.  The estimated ejection fraction is 55%.  Mild aortic regurgitation.       ECHO  2014  CONCLUSIONS     1 - Normal left ventricular systolic function (EF 55-60%).     2 - Normal left ventricular diastolic function.     3 - Normal right ventricular systolic function .     4 - Mild aortic regurgitation.     5 - Concentric  remodeling.         STRESS TEST 10/12/21  Normal myocardial perfusion scan. There is no evidence of myocardial ischemia or infarction.    The gated perfusion images showed an ejection fraction of 47% at rest. The gated perfusion images showed an ejection fraction of 63% post stress.    The EKG portion of this study is uninterpretable due to significant baseline abnormalities    The patient reported no chest pain during the stress test.      STRESS TEST 2017  EKG Conclusions:   1. The EKG portion of this study is negative for ischemia at a moderate workload, and peak heart rate of 80 bpm (55% of predicted).   2. Blood pressure remained stable throughout the protocol  (Presenting BP: 119/70 Peak BP: 114/49).   3. No significant arrhythmias were present.   4. There were no symptoms of chest discomfort or significant dyspnea throughout the protocol.     Impression: NORMAL MYOCARDIAL PERFUSION   1. The perfusion scan is free of evidence for myocardial ischemia or injury.   2. Resting wall motion is physiologic.   3. Resting LV function is normal.   4. The ventricular volumes are normal at rest and stress.   5. The extracardiac distribution of radioactivity is normal.         KAMALJIT 12/4/20 (Normal ABIs)  The right ankle [DT] artery has triphasic flow.   The right ankle [DP] artery has triphasic flow.   The left ankle [PT] artery has triphasic flow.  The left ankle [DP] artery has triphasic flow.    [facility]      ROS: All 10 systems reviewed. Please refer to the HPI for pertinent positives. All other systems negative.     Past Medical History  Past Medical History:   Diagnosis Date    Anemia due to unknown mechanism 11/10/2015    Angina pectoris 2014    not since    Arthritis     Back pain     Coronary artery disease     Diabetes mellitus with stage 3 chronic kidney disease 11/18/2020    DM (diabetes mellitus) 25-30 years     am 05/15/2019    DM (diabetes mellitus)     BS 97 am 06/04/2021    Encounter for blood transfusion      Gout 12/05/2017    right foot     History of blood transfusion     Hyperlipemia     Hypertension     CHARLES (obstructive sleep apnea)     Polyneuropathy     Spinal cord disease     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    Trouble in sleeping     Type 2 diabetes mellitus with diabetic polyneuropathy, without long-term current use of insulin     Urinary incontinence     Uses hearing aid     bilat       Surgical History  Past Surgical History:   Procedure Laterality Date    BACK SURGERY  2019    COLONOSCOPY N/A 6/30/2020    Procedure: COLONOSCOPY;  Surgeon: Joseph Iraheta MD;  Location: [facility] ENDO;  Service: Endoscopy;  Laterality: N/A;    ESOPHAGOGASTRODUODENOSCOPY N/A 6/30/2020    Procedure: EGD (ESOPHAGOGASTRODUODENOSCOPY);  Surgeon: Joseph Iraheta MD;  Location: [facility] ENDO;  Service: Endoscopy;  Laterality: N/A;    HERNIA REPAIR Bilateral 04/18/2017    INJECTION OF JOINT Right 4/14/2023    Procedure: right Synvisc Knee injection;  Surgeon: Hossein Broosk MD;  Location: [facility] PAIN MGT;  Service: Pain Management;  Laterality: Right;    JOINT REPLACEMENT Left 10/09/2017    L YAIHR Dr. Alcocer    LAMINECTOMY  07/22/2016    left elbow  10/2017    procedure to remove gout    lumbar foraminotomy  03/2018    REVISION TOTAL HIP ARTHROPLASTY Left 9/11/2018    Procedure: REVISION, TOTAL ARTHROPLASTY, HIP;  Surgeon: Albaro Alcocer Sr., MD;  Location: [facility];  Service: Orthopedics;  Laterality: Left;    ROTATOR CUFF REPAIR Left 2006    SHOULDER ARTHROSCOPY W/ ROTATOR CUFF REPAIR Right 2009          Allergies:   Review of patient's allergies indicates:   Allergen Reactions    Sulfa (sulfonamide antibiotics)      Can't recall from 1995       Social History:  Social History     Socioeconomic History    Marital status:     Number of children: 0    Highest education level: Master's degree (e.g., MA, MS, Olegario, MEd, MSW, CARMEL)   Occupational History    Occupation: retired     Comment: teacher    Tobacco Use    Smoking status: Never    Smokeless tobacco: Never   Substance and Sexual Activity    Alcohol use: Yes     Alcohol/week: 1.0 standard drink     Types: 1 Glasses of wine per week     Comment: rarely  No alcohol prior to surgery    Drug use: No    Sexual activity: Yes     Partners: Female     Birth control/protection: Condom   Social History Narrative    . No children. Retired but some part time work - 4 hours a day. 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 Caffeine intake - sugar free cola, Rare coffee intake. Still drives. Does have a Living Will . Has a nephew Jt Gayle, lives in [place]. Has a friend Karina Rossi, locally who could help him.        Family History:  family history includes Cancer (age of onset: 50) in his brother; Diabetes in his father, mother, and sister; Drug abuse in his brother; Heart disease in his father and mother; Hypertension in his father and mother; Stroke in his father.    Home Medications:  Current Outpatient Medications on File Prior to Visit   Medication Sig Dispense Refill    albuterol (VENTOLIN HFA) 90 mcg/actuation inhaler Inhale 2 puffs into the lungs every 6 (six) hours as needed for Wheezing or Shortness of Breath. Rescue 18 g 3    allopurinoL (ZYLOPRIM) 100 MG tablet TAKE 1 TABLET EVERY DAY 90 tablet 3    bisacodyl (DULCOLAX) 10 mg Supp   0    blood glucose control, low Soln by Misc.(Non-Drug; Combo Route) route.      blood sugar diagnostic (TRUE METRIX GLUCOSE TEST STRIP) Strp Use as directed to check sugars 100 strip 11    blood-glucose meter (TRUE METRIX AIR GLUCOSE METER) kit USE AS DIRECTED 1 each 0    ciprofloxacin HCl (CILOXAN) 0.3 % ophthalmic solution INSTILL 1 DROP IN RIGHT EYE TWICE DAILY FOR 1 WEEK THEN STOP      cyanocobalamin, vitamin B-12, 1,000 mcg Subl Place 1,000 mcg under the tongue once daily. 90 tablet 3    fluticasone propionate (FLONASE) 50 mcg/actuation nasal spray 1 spray (50 mcg total) by Each Nostril route once daily. 16 mL 0     "GARLIC EXTRACT ORAL Take 1 tablet by mouth once daily. Per [facility]      JARDIANCE 10 mg tablet       ketorolac 0.5% (ACULAR) 0.5 % Drop Place 1 drop into the right eye 4 (four) times daily.      lancets Misc Use as directed to check sugars 100 each 11    levocetirizine (XYZAL) 5 MG tablet Take 1 tablet (5 mg total) by mouth every evening. 30 tablet 11    LEXISCAN 0.4 mg/5 mL Syrg       losartan (COZAAR) 50 MG tablet Take 1 tablet (50 mg total) by mouth once daily. 90 tablet 3    melatonin 10 mg Tab Take 1 tablet (10 mg total) by mouth every evening. 30 tablet 11    metFORMIN (GLUCOPHAGE) 1000 MG tablet TAKE 1 TABLET TWICE DAILY WITH MEALS 180 tablet 3    metoprolol succinate (TOPROL-XL) 50 MG 24 hr tablet TAKE 1 TABLET EVERY DAY 90 tablet 3    mometasone 0.1% (ELOCON) 0.1 % cream Apply topically once daily. 50 g 2    moxifloxacin (VIGAMOX) 0.5 % ophthalmic solution       multivitamin (THERAGRAN) per tablet Take 1 tablet by mouth once daily.      papaverine 30 mg/mL injection Inject 0.3 ml of trimix solution prn ED max once daily  Prepare 10ml of trimix solution containing:    Papaverine 30mg/ml    Phentolamine 1 mg/ml    Alprostadil 10mcg/ml  Dispense 10ml per refill  Qs syringes 1cc/30g/0.5" and alcohol swabs dispense as needed for Intracavernosal injection 10 mL 5    PFIZER COVID BIVAL,12Y UP,,PF, 30 mcg/0.3 mL injection       pravastatin (PRAVACHOL) 40 MG tablet TAKE 1 TABLET EVERY DAY 90 tablet 2    prednisoLONE acetate (PRED FORTE) 1 % DrpS       pregabalin (LYRICA) 75 MG capsule Take 1-2 capsules ( mg total) by mouth every evening. 180 capsule 1    REFRESH OPTIVE 0.5-0.9 % Drop SMARTSI Drop(s) In Eye(s) PRN      tamsulosin (FLOMAX) 0.4 mg Cap Take 1 capsule (0.4 mg total) by mouth once daily. 90 capsule 3    cetirizine (ZYRTEC) 10 MG tablet Take 1 tablet (10 mg total) by mouth every evening. for 14 days 14 tablet 0    clonazePAM (KLONOPIN) 0.5 MG tablet Take 1 tablet (0.5 mg total) by mouth " "every evening. 30 tablet 3    olopatadine (PATANOL) 0.1 % ophthalmic solution Place 1 drop into the right eye 2 (two) times daily. 5 mL 0    pramipexole (MIRAPEX) 0.125 MG tablet Take 1 tablet (0.125 mg total) by mouth every evening. 30 tablet 3     No current facility-administered medications on file prior to visit.       Physical exam:  /62 (BP Location: Right arm, Patient Position: Sitting, BP Method: Medium (Manual))   Pulse 70   Ht 6' 3" (1.905 m)   Wt 100.7 kg (222 lb 0.1 oz)   SpO2 98%   BMI 27.75 kg/m²       General: Pt is a 82 y.o. year old male who is AAOx3, in NAD, is pleasant, well nourished, looks stated age, walks with a cane  HEENT: PERRL, EOMI, Oral mucosa pink & moist  CVS  No abnormal cardiac pulsations noted on inspection. JVP not raised. The apical impulse is normal on palpation, and is located in the left 5th intercostal space in the mid - clavicular line. No palpable thrills or abnormal pulsations noted. RR, S1 - S2 heard, 1/6 systolic murmur at apex, rubs or gallops appreciated.   PUL : CTA B/L. No wheezes/crakles heard   ABD : BS +, soft. No tenderness elicited   LE : No C/C/E. Distal Pulses palpable B/L         LABS:    Chemistry:   Lab Results   Component Value Date     05/23/2023    K 4.4 05/23/2023     05/23/2023    CO2 24 05/23/2023    BUN 16 05/23/2023    CREATININE 1.4 05/23/2023    CALCIUM 9.1 05/23/2023     Cardiac Markers:   Lab Results   Component Value Date    TROPONINI <0.006 03/10/2015     Cardiac Markers (Last 3):   Lab Results   Component Value Date    TROPONINI <0.006 03/10/2015     CBC:   Lab Results   Component Value Date    WBC 3.99 06/14/2023    HGB 11.7 (L) 06/14/2023    HCT 35.9 (L) 06/14/2023    MCV 88 06/14/2023     06/14/2023     Lipids:   Lab Results   Component Value Date    CHOL 128 10/07/2022    TRIG 153 (H) 10/07/2022    HDL 31 (L) 10/07/2022     Coagulation:   Lab Results   Component Value Date    INR 1.0 07/31/2019    APTT 26.2 " 07/31/2019           Assessment    1. Hypertension associated with diabetes    2. Aortic calcification    3. Combined hyperlipidemia associated with type 2 diabetes mellitus    4. Type 2 diabetes mellitus with diabetic polyneuropathy, without long-term current use of insulin    5. Osteoarthritis of spine with radiculopathy, lumbar region    6. Atherosclerosis of aorta    7. Dyspnea on exertion            Plan:    Dyspnea on exertion- improving  Due to deconditioning  Echo 10/21 with normal EF, mild AI (unchanged from prior)  Pharmacologic nuclear stress test 10/21 without evidence of ischemia  F/u with pulmonology     Dizziness- improved  Orthostatics in clinic normal  Holding tamsulosin    CAD  H/o calcified aorta  Denies angina  Continue aspirin    HLD  LDL 66 as of 10/22  Continue pravastatin 40 mg daily  Lipid panel 10/23    HTN  Stable  Continue losartan 50 mg daily, continue metoprolol 50 mg daily    Diabetes  Stable, A1c 6.2  Continue therapy     Arthritis  Chronic back and knee pain  Patient does not take pain medications  Ambulates with a cane          I have reviewed all pertinent chart information.  Plans and recommendations have been formulated under my direct supervision. All questions answered and patient voiced understanding.   If symptoms persist go to the ED.    RTC in 6 months      Yasmine Calvillo MD  Cardiology

## 2023-07-19 ENCOUNTER — TELEPHONE (OUTPATIENT)
Dept: INTERNAL MEDICINE | Facility: CLINIC | Age: 83
End: 2023-07-19
Payer: MEDICARE

## 2023-07-19 ENCOUNTER — TELEPHONE (OUTPATIENT)
Dept: ADMINISTRATIVE | Facility: HOSPITAL | Age: 83
End: 2023-07-19
Payer: MEDICARE

## 2023-07-19 NOTE — TELEPHONE ENCOUNTER
----- Message from Indiana Morrison sent at 7/19/2023 11:23 AM CDT -----  Contact: Goyo Rivas would like a call back at [phone], in regards to an authorization for the pt office visit with Harmony Do. (Authorization# [id number])

## 2023-07-26 ENCOUNTER — TELEPHONE (OUTPATIENT)
Dept: PAIN MEDICINE | Facility: CLINIC | Age: 83
End: 2023-07-26
Payer: MEDICARE

## 2023-07-26 NOTE — TELEPHONE ENCOUNTER
Reach out to [facility] at  [phone]  in regards to medications. No one answered. Left voice mail.

## 2023-07-26 NOTE — TELEPHONE ENCOUNTER
----- Message from Deven Lugo MA sent at 7/26/2023 11:57 AM CDT -----  Contact: Mary Galvan was closed call back again   ----- Message -----  From: Hossein Brooks MD  Sent: 7/26/2023  11:17 AM CDT  To: Deven Lugo MA    Can we please call the patient to see what his questions are regarding his medication please  ----- Message -----  From: Deven Lugo MA  Sent: 7/26/2023  11:00 AM CDT  To: Hossein Brooks MD      ----- Message -----  From: Mandi Ponce  Sent: 7/25/2023   3:50 PM CDT  To: Todd Catalan Staff    Maryam Willams is calling to speak with the nurse in regards to medications. Reports needing to review medications with provider for patient. Please give Maryam a callback at [phone].

## 2023-08-11 DIAGNOSIS — G47.61 PLMD (PERIODIC LIMB MOVEMENT DISORDER): Chronic | ICD-10-CM

## 2023-08-11 RX ORDER — PRAMIPEXOLE DIHYDROCHLORIDE 0.12 MG/1
TABLET ORAL
Qty: 30 TABLET | Refills: 11 | Status: SHIPPED | OUTPATIENT
Start: 2023-08-11 | End: 2024-03-20 | Stop reason: SDUPTHER

## 2023-08-15 ENCOUNTER — OFFICE VISIT (OUTPATIENT)
Dept: INTERNAL MEDICINE | Facility: CLINIC | Age: 83
End: 2023-08-15
Payer: MEDICARE

## 2023-08-15 ENCOUNTER — TELEPHONE (OUTPATIENT)
Dept: PAIN MEDICINE | Facility: CLINIC | Age: 83
End: 2023-08-15
Payer: MEDICARE

## 2023-08-15 VITALS
HEIGHT: 75 IN | HEART RATE: 66 BPM | OXYGEN SATURATION: 99 % | BODY MASS INDEX: 26.89 KG/M2 | DIASTOLIC BLOOD PRESSURE: 80 MMHG | SYSTOLIC BLOOD PRESSURE: 136 MMHG | RESPIRATION RATE: 18 BRPM | WEIGHT: 216.25 LBS

## 2023-08-15 DIAGNOSIS — R94.2 DIFFUSION CAPACITY OF LUNG (DL), DECREASED: ICD-10-CM

## 2023-08-15 DIAGNOSIS — E78.2 COMBINED HYPERLIPIDEMIA ASSOCIATED WITH TYPE 2 DIABETES MELLITUS: Chronic | ICD-10-CM

## 2023-08-15 DIAGNOSIS — E11.69 COMBINED HYPERLIPIDEMIA ASSOCIATED WITH TYPE 2 DIABETES MELLITUS: Chronic | ICD-10-CM

## 2023-08-15 DIAGNOSIS — D64.9 CHRONIC ANEMIA: ICD-10-CM

## 2023-08-15 DIAGNOSIS — R94.2 RESTRICTIVE VENTILATORY DEFECT: ICD-10-CM

## 2023-08-15 DIAGNOSIS — G89.29 CHRONIC PAIN OF RIGHT KNEE: ICD-10-CM

## 2023-08-15 DIAGNOSIS — E11.59 HYPERTENSION ASSOCIATED WITH DIABETES: ICD-10-CM

## 2023-08-15 DIAGNOSIS — E11.22 DIABETES MELLITUS WITH STAGE 3 CHRONIC KIDNEY DISEASE: ICD-10-CM

## 2023-08-15 DIAGNOSIS — M25.561 CHRONIC PAIN OF RIGHT KNEE: ICD-10-CM

## 2023-08-15 DIAGNOSIS — D72.819 LEUKOPENIA, UNSPECIFIED TYPE: ICD-10-CM

## 2023-08-15 DIAGNOSIS — N40.0 BENIGN PROSTATIC HYPERPLASIA, UNSPECIFIED WHETHER LOWER URINARY TRACT SYMPTOMS PRESENT: ICD-10-CM

## 2023-08-15 DIAGNOSIS — D57.3 SICKLE CELL TRAIT: ICD-10-CM

## 2023-08-15 DIAGNOSIS — Z78.9 NEEDS ASSISTANCE WITH COMMUNITY RESOURCES: ICD-10-CM

## 2023-08-15 DIAGNOSIS — N18.30 DIABETES MELLITUS WITH STAGE 3 CHRONIC KIDNEY DISEASE: ICD-10-CM

## 2023-08-15 DIAGNOSIS — M47.26 OSTEOARTHRITIS OF SPINE WITH RADICULOPATHY, LUMBAR REGION: Chronic | ICD-10-CM

## 2023-08-15 DIAGNOSIS — Z00.00 ENCOUNTER FOR MEDICARE ANNUAL WELLNESS EXAM: Primary | ICD-10-CM

## 2023-08-15 DIAGNOSIS — I70.0 AORTIC CALCIFICATION: Chronic | ICD-10-CM

## 2023-08-15 DIAGNOSIS — I15.2 HYPERTENSION ASSOCIATED WITH DIABETES: ICD-10-CM

## 2023-08-15 DIAGNOSIS — E11.42 TYPE 2 DIABETES MELLITUS WITH DIABETIC POLYNEUROPATHY, WITHOUT LONG-TERM CURRENT USE OF INSULIN: Chronic | ICD-10-CM

## 2023-08-15 DIAGNOSIS — M1A.0710 IDIOPATHIC CHRONIC GOUT OF RIGHT FOOT WITHOUT TOPHUS: Chronic | ICD-10-CM

## 2023-08-15 DIAGNOSIS — Z99.89 DEPENDENCE ON OTHER ENABLING MACHINES AND DEVICES: ICD-10-CM

## 2023-08-15 PROCEDURE — 1160F RVW MEDS BY RX/DR IN RCRD: CPT | Mod: HCNC,CPTII,S$GLB, | Performed by: NURSE PRACTITIONER

## 2023-08-15 PROCEDURE — 1160F PR REVIEW ALL MEDS BY PRESCRIBER/CLIN PHARMACIST DOCUMENTED: ICD-10-PCS | Mod: HCNC,CPTII,S$GLB, | Performed by: NURSE PRACTITIONER

## 2023-08-15 PROCEDURE — G0439 PPPS, SUBSEQ VISIT: HCPCS | Mod: HCNC,S$GLB,, | Performed by: NURSE PRACTITIONER

## 2023-08-15 PROCEDURE — 1170F FXNL STATUS ASSESSED: CPT | Mod: HCNC,CPTII,S$GLB, | Performed by: NURSE PRACTITIONER

## 2023-08-15 PROCEDURE — 3075F SYST BP GE 130 - 139MM HG: CPT | Mod: HCNC,CPTII,S$GLB, | Performed by: NURSE PRACTITIONER

## 2023-08-15 PROCEDURE — 3288F FALL RISK ASSESSMENT DOCD: CPT | Mod: HCNC,CPTII,S$GLB, | Performed by: NURSE PRACTITIONER

## 2023-08-15 PROCEDURE — 3288F PR FALLS RISK ASSESSMENT DOCUMENTED: ICD-10-PCS | Mod: HCNC,CPTII,S$GLB, | Performed by: NURSE PRACTITIONER

## 2023-08-15 PROCEDURE — 1159F PR MEDICATION LIST DOCUMENTED IN MEDICAL RECORD: ICD-10-PCS | Mod: HCNC,CPTII,S$GLB, | Performed by: NURSE PRACTITIONER

## 2023-08-15 PROCEDURE — 1125F AMNT PAIN NOTED PAIN PRSNT: CPT | Mod: HCNC,CPTII,S$GLB, | Performed by: NURSE PRACTITIONER

## 2023-08-15 PROCEDURE — 1125F PR PAIN SEVERITY QUANTIFIED, PAIN PRESENT: ICD-10-PCS | Mod: HCNC,CPTII,S$GLB, | Performed by: NURSE PRACTITIONER

## 2023-08-15 PROCEDURE — 3075F PR MOST RECENT SYSTOLIC BLOOD PRESS GE 130-139MM HG: ICD-10-PCS | Mod: HCNC,CPTII,S$GLB, | Performed by: NURSE PRACTITIONER

## 2023-08-15 PROCEDURE — 3079F DIAST BP 80-89 MM HG: CPT | Mod: HCNC,CPTII,S$GLB, | Performed by: NURSE PRACTITIONER

## 2023-08-15 PROCEDURE — G0439 PR MEDICARE ANNUAL WELLNESS SUBSEQUENT VISIT: ICD-10-PCS | Mod: HCNC,S$GLB,, | Performed by: NURSE PRACTITIONER

## 2023-08-15 PROCEDURE — 1101F PR PT FALLS ASSESS DOC 0-1 FALLS W/OUT INJ PAST YR: ICD-10-PCS | Mod: HCNC,CPTII,S$GLB, | Performed by: NURSE PRACTITIONER

## 2023-08-15 PROCEDURE — 1159F MED LIST DOCD IN RCRD: CPT | Mod: HCNC,CPTII,S$GLB, | Performed by: NURSE PRACTITIONER

## 2023-08-15 PROCEDURE — 3079F PR MOST RECENT DIASTOLIC BLOOD PRESSURE 80-89 MM HG: ICD-10-PCS | Mod: HCNC,CPTII,S$GLB, | Performed by: NURSE PRACTITIONER

## 2023-08-15 PROCEDURE — 99999 PR PBB SHADOW E&M-EST. PATIENT-LVL V: CPT | Mod: PBBFAC,HCNC,, | Performed by: NURSE PRACTITIONER

## 2023-08-15 PROCEDURE — 99999 PR PBB SHADOW E&M-EST. PATIENT-LVL V: ICD-10-PCS | Mod: PBBFAC,HCNC,, | Performed by: NURSE PRACTITIONER

## 2023-08-15 PROCEDURE — 1101F PT FALLS ASSESS-DOCD LE1/YR: CPT | Mod: HCNC,CPTII,S$GLB, | Performed by: NURSE PRACTITIONER

## 2023-08-15 PROCEDURE — 1170F PR FUNCTIONAL STATUS ASSESSED: ICD-10-PCS | Mod: HCNC,CPTII,S$GLB, | Performed by: NURSE PRACTITIONER

## 2023-08-15 NOTE — PROGRESS NOTES
Established Patient Chronic Pain Note (Follow-up Visit)    Referring Physician: Yeison Wilder MD    PCP: Yeison Wilder MD    Chief Complaint:   Chief Complaint   Patient presents with    Knee Pain     Righrt      Low-back Pain - improved    Knee Pain    RIGHT      Interval History (8/16/2023):   Patient presents today for follow-up visit.  Patient was last seen on 7/17/2023. At that visit, the plan was to  Schedule right genicular nerve block, but it was not approved with insurance. Patient reports pain as 7/10 today.  He continues to have chronic knee pain.  He has failed knee steroid injections with Dr. Winters.  Also reports that he has had viscosupplementation in the past with limited pain relief.  He does not believe that he is a surgical candidate.    Interval History (7/17/2023):  Srinath Mcknight presents today for follow-up visit.  Patient was last seen on 5/11/2023. Pain in right knee has returned; it has only been about 3 months since viscosupplementation. Patient reports pain as 7/10 today.  He has been going to the gym.  He also reports right hand pain.  He saw Dr. Douglas in the past and had an injection, and the pain has returned.    Interval History (5/11/2023): Srinath Mcknight presents today for follow-up visit.  he underwent right knee Synvisc-One injection on 4/14/23 (1 month ago).  The patient reports that he is/was better following the procedure.  he reports 50% pain relief.     The changes have continued through this visit.  Patient reports pain as 8/10 today.  His main complaint today is right groin pain.  He has never had any prior treatment of his right hip in the past.    Interval History (3/23/2023):  Srinath Mckinght presents today for follow-up visit.  Patient was last seen on 2/16/2023. At that visit, the plan was to start Lyrica, only talking QHS, which seems to be helping. Patient reports pain as 6/10 today.  His main complaint today is right knee pain.  He had a right knee  steroid injection with Dr. Winters on 11/09/2022 with about 3-4 months pain relief.  This was the 1st treatment he has had for his right knee in the past.  Does not have any left knee pain.  Continues to do at home physical therapy exercises, which she feels is helping his back pain.  Overall, stable; pain is better controlled than it was at the last visit.    Initial HPI (2/16/2023):  Srinath Mcknight is a 82 y.o. male with past medical history significant for hypertension, hyperlipidemia, type 2 diabetes, coronary artery disease, urinary incontinence, sickle cell trait, stage 3 chronic kidney disease, gout, obstructive sleep apnea, history of L3-4 lumbar fusion in L4-5 lumbar laminectomy who presents to the clinic for the evaluation of lower back pain.  Of note patient reports 3 prior back surgeries:  The initial several years prior in the left lower back at [facility], and 2 lower back surgeries with Dr. Iqbal within the last 2 years.  Today patient reports pain which is constant which is rated an 8/5.  Pain is described as aching in nature.  Patient reports pain in a bandlike distribution in the lower back without radiation into the buttocks or lower extremities.  Patient does report chronic right-sided knee pain and history of total left hip arthroplasty.  Pain is exacerbated 1st thing in the morning when arising, with lumbar extension as well as with ambulation.  Patient reports he is able to ambulate only a few steps before requiring rest.  Pain is improved with lumbar support as well as sitting.  Patient also reports receiving a lumbar epidural steroid injection at the back and spine [place] without any meaningful relief.  Patient has completed conventional physical therapy last year and continues physician directed physical therapy exercises at home without meaningful relief.  Patient has been taking gabapentin 600 mg once daily without improvement in his pain.  Patient does endorse weakness  in the lower extremities associated with his pain.  Patient reports significant motor weakness.  Patient denies night fever/night sweats, urinary incontinence, bowel incontinence, significant weight loss, and loss of sensations.    Pain Disability Index Review:  Last 3 PDI Scores 8/16/2023 7/17/2023 2/16/2023   Pain Disability Index (PDI) 42 42 30       Non-Pharmacologic Treatments:  Physical Therapy/Home Exercise: yes  Ice/Heat:no  TENS: no  Acupuncture: no  Massage: no  Chiropractic: no    Other: yes; sx x 3: [facility], Dr. Iqbal x 2      Pain Medications:  - Opioids: Percocet (Oxycodone/Acetaminophen)  - Adjuvant Medications: Clonazepam (Klonopin), Neurontin (Gabapentin), and Prednisone (Deltasone)    Relevant sx:  - 3 prior back surgeries:  1st [facility], and 2 lower back surgeries with Dr. Iqbal (March 2018: L3-L4 L5 decompression/laminotomy/micro dissection; August 2019: L3-4 revision with cage insertion and laminectomy/diskectomy)  - total left hip arthroplasty (total of 2 surgeries)    Pain Procedures:   [facility]    Dr. Brooks:  -right knee Synvisc-One injection on 4/14/23 with 50% pain relief    Other providers:  -cervical medial branch facet injections in 2020 with Dr. Linares with subsequent RFA  -02/15/2018: Left-sided sacroiliac joint injection; Dr. Roldan  -01/24/2018: Left-sided L4/5 and L5/S1 transforaminal epidural steroid injection; Dr. Roldan  -06/14/2016: Left L3-4 facet joint injection; Dr. Hutchinson  -05/18/2016: Left hip intra-articular injection posterior approach; Dr. Madera    Orthopedics:  -right knee steroid injection with Dr. Winters on 11/09/2022 with about 3-4 months pain relief  -right wrist injection in May 2021 with Dr. Douglas       Review of Systems:  GENERAL:  No weight loss, malaise or fevers.  HEENT:   No recent changes in vision or hearing  NECK:  Negative for lumps, no difficulty with swallowing.  RESPIRATORY:  Negative for cough, wheezing or shortness of  "breath, patient denies any recent URI.  CARDIOVASCULAR:  Negative for chest pain or palpitations.  GI:  Negative for abdominal discomfort, blood in stools or black stools or change in bowel habits.  MUSCULOSKELETAL:  See HPI.  SKIN:  Negative for lesions, rash, and itching.  PSYCH:  No mood disorder or recent psychosocial stressors.   HEMATOLOGY/LYMPHOLOGY:  Negative for prolonged bleeding, bruising easily or swollen nodes.    NEURO:   No history of syncope, paralysis, seizures or tremors.  All other reviewed and negative other than HPI.        OBJECTIVE:    Physical Exam:  Vitals:    08/16/23 0905   BP: (!) 156/83   Pulse: 63   Resp: 14   Weight: 97.7 kg (215 lb 6.2 oz)   Height: 6' 3" (1.905 m)   PainSc:   7   PainLoc: Knee    Body mass index is 26.92 kg/m².   (reviewed on 8/16/2023)    GENERAL: Well appearing, in no acute distress, alert and oriented x3.  PSYCH:  Mood and affect appropriate.  SKIN: Skin color, texture, turgor normal, no rashes or lesions.  HEAD/FACE:  Normocephalic, atraumatic.    PULM: No evidence of respiratory difficulty, symmetric chest rise.  GI: no obvious distention.    BACK:  Positive shopping cart sign.  Well-healed 5 in lower lumbar vertical incision.  SLR is negative to radicular pain. No pain to palpation over the facet joints of the lumbar spine or spinous processes.  Reduced range of motion with pain reproduction.  EXTREMITIES:  Peripheral joint range of motion is reduced with pain with instability noted bilateral lower extremities. No deformities, edema, or skin discoloration. Right wrist: TTP over medial wrist.  MUSCULOSKELETAL: Able to stand on heels but not toes secondary to history of broken toes.     There is no pain with palpation over the sacroiliac joints bilaterally.  Gaenslen's, Distraction/Compression.  Pain with internal/external rotation of right hip.  Fabere's positive on the right.  Facet loading test is negative bilaterally.      Right Knee: pain with extension " and flexion. TTP diffusely. Crepitus Present. No pain on left.    BUE & BLE strength is normal and symmetric.  No atrophy or tone abnormalities are noted.    RIGHT Lower extremity: Hip flexion 5/5, Hip Abduction 5/5, Hip Adduction 5/5, Knee extension 5/5, Knee flexion 5/5, Ankle dorsiflexion5/5, Extensor hallucis longus 5/5, Ankle plantarflexion 5/5  LEFT Lower extremity:  Hip flexion 5/5, Hip Abduction 5/5,Hip Adduction 5/5, Knee extension 5/5, Knee flexion 5/5, Ankle dorsiflexion 5/5, Extensor hallucis longus 5/5, Ankle plantarflexion 5/5  -Normal testing knee (patellar) jerk and ankle (achilles) jerk    NEURO: BUE & BLE coordination and muscle stretch reflexes are physiologic and symmetric. No loss of sensation is noted.  GAIT: normal.      Imaging (Reviewed on 8/16/2023):    05/11/21 X-ray Knee Ortho Bilateral with Flexion  COMPARISON:  December 14, 2017  FINDINGS:  Bilateral tricompartment degenerative changes.  There is increased narrowing medial compartments.  No fracture, dislocation or effusions.  Soft tissues within normal limits.  Impression  Bilateral degenerative change without fracture or dislocation.     12/20/17 MRI Lumbar Spine W  WO Contrast    FINDINGS:  Since the previous study there has been a laminectomy at L4-L5. There is enhancement of granulation tissue at the operative site.  No abnormal enhancement surrounds the exiting nerve roots.  The thecal sac measures 9 mm AP at this level.  There is slight mass effect upon the lateral recess without definite compression of the descending L5 nerve roots. There is a synovial cyst with peripheral enhancement projecting centrally and inferiorly from the left facet joint at L3-L4 that demonstrates peripheral enhancement.  It causes mild spinal stenosis at the level of L4 and is slightly larger than on the previous study from May 2016. The conus medullaris terminates at the level ofL1-L2. No abnormal signal within the conus. Intervertebral disc levels are  as follows:    T12-L1 disc: No significant disc pathology. No spinal canal or neural foraminal stenosis.    L1-L2 disc : No significant disc pathology. No spinal canal or neural foraminal stenosis.    L2-L3 disc: Normal disc height with mild degenerative facet changes and thickening of ligamentum flavum.  No significant canal or foraminal stenosis.    L3-L4 disc: Partial distal desiccation with a broad-based posterior disc bulge.  There is a far left lateral annular fissure of the disc.  Moderate to severe degenerative facet change with bilateral facet joint effusions.  The thecal sac measures 10 mm AP but is about 50% of normal cross-sectional area.  Disc material bulges into the floor of the left exit foramen and causes mild to moderate left foraminal stenosis.  There is minimal right foraminal stenosis.  Additionally, there is a synovial cyst projects centrally and inferiorly from the left facet joint.  The synovial cyst demonstrates peripheral enhancement and measures 11 mm craniocaudal by 11 mm transverse by 5 mm AP.  It is best demonstrated on axial T2 series 6 image 23.  It causes mild thecal sac stenosis without definite neural compression.  There is a smaller synovial cyst projecting toward the left as well, remote from any neural structures.    L4-L5 disc: Level of interval laminectomy.  There is enhancing granulation tissue at the operative site without enhancement surrounding the exiting nerve roots.  The thecal sac measures 9 mm AP.  There is mild mass effect upon the lateral recesses without definite compression no descending L5 nerve roots.  Minimal bilateral foraminal stenosis.    L5-S1 disc: Partial does desiccation and disc space height loss.  Height loss is most severe toward the left where there are marginal osteophytes.  Osteophyte material encroaches into the floor of the left exit foramen causing moderate to severe left foraminal stenosis.  The right neural foramen is minimally narrowed.   Moderate degenerative facet change.  The thecal sac measures 12 mm AP.  IMPRESSION:      1.  There has been an interval laminectomy at L4-L5.  There is enhancing granulation tissue at the operative site.  There is diminished spinal stenosis at this level compared to the previous exam.    2.  Enhancement surrounds a synovial cyst that projects centrally and inferiorly from the left L3-L4 facet joint and causes mild spinal stenosis.  The cyst has increased in size compared to the previous examination.    3.  There is mild to moderate spinal stenosis at L3-L4 where the thecal sac is about 50% of normal cross-sectional area, predominantly related to hypertrophy of the posterior elements.    4.  There is a far left lateral annular fissure of L3-L4 disc.    5.  Moderate to severe left-sided foraminal stenosis at L5-S1.       11/14/22 X-Ray Lumbar Spine AP And Lateral  FINDINGS:  Prior posterior fusion of L3-L4 with interbody spacer.  Normal alignment.  No evidence to suggest hardware failure.  Remaining lumbar vertebral bodies are normal in height and alignment.  There is mild to moderate multilevel degenerative disc disease most pronounced at L5-S1.  SI joints are intact.       11/18/19 X-Ray Cervical Spine AP And Lateral  FINDINGS:  There is visualization of C7-T1 disc level on the lateral view.  Interval progression multilevel degenerative changes throughout the C-spine.  Anterior and posterior marginal spurring with concomitant varying degrees of loss of disc height throughout the C-spine.  No acute fracture or subluxation.  C1-2 articulation appears within normal limits allowing for rotation.  Impression  Interval progression multilevel cervical spondylosis without acute fracture or subluxation.  Follow-up and/or further evaluation as warranted.        ASSESSMENT: 82 y.o. year old male with lower back pain, consistent with     1. Chronic pain of right knee  Ambulatory referral/consult to Orthopedics     LIDOcaine-prilocaine (EMLA) cream    diclofenac sodium (VOLTAREN) 1 % Gel      2. Right hip pain        3. History of lumbar fusion        4. History of lumbar laminectomy                PLAN:   - Interventions:    - Cancel right genicular nerve block -- due to insurance approval. Patient is not taking prescription blood thinners or ASA.    - S/p right knee Synvisc-One injection on 4/14/23 with 50% pain relief. Previously had right knee steroid injection with Dr. Winters on 11/09/2022 with about 3-4 months pain relief.  - Consider right hip injection. No previous tx.  - We have previously discussed considering a lumbar TF GAEL  vs caudal GAEL.  We will 1st review most recent MRI; will hold off for now since low back pain stable. Explained the risks and benefits of the procedure in detail with the patient previously in clinic along with alternative treatment options.    - Anticoagulation use: no no anticoagulation    - Medications:  - Refill Lyrica 75mg QHS for neuropathic pain - started at previous visit, which he felt was helping. X 90 day supply. We previously discussed potential side effects of this medication which may include drowsiness,dizziness, dry mouth, constipation or peripheral edema. Try to try to Increase 150mg QHS if tolerated.   - Continue hemp seed oil topically for knee and back.  - Start diclofenac 1% gel to apply 2g topically up to 4 times per day PRN.   - Start lidocaine 2.5%-prilocaine 2.5% topical cream Q6H PRN topically; can alternate with Voltaren gel.       - Therapy: We have previously discussed independently continuing learned exercises learned at physical therapy to help manage the patient/s painful condition. The patient was previously counseled that muscle strengthening will improve the long term prognosis in regards to pain and may also help increase range of motion and mobility.  He continues learned at home physical therapy exercises religiously, as he does not feel he got any added  benefit from going to outpatient therapy. Continue going to gym.     -Imaging:   - Most recent lumbar MRI reviewed previously (2019).  - Consider ordering hip x-ray if patient decides to move forward with intervention.     - Records: Extensive record review completed from Dr. Iqbal: [facility] [place] -- reviewed previously and added to the patient's chart.    - Refer:   - Follow-up with Dr. Douglas for right hand pain; had right wrist injection in 5/2021 with good pain relief.  - Consider Orthopedics follow-up for knee pain (Dr. Winters).   - Schedule referral to Dr. Schuler for knee pain: Failed steroid knee injection, failed viscosupplementation, genicular nerve procedures not approved with insurance.    - Follow up visit: PRN     - This condition does not require this patient to take time off of work, and the primary goal of our Pain Management services is to improve the patient's functional capacity.   - I discussed the risks, benefits, and alternatives to potential treatment options. All questions and concerns were fully addressed today in clinic.         Leatha Franks PA-C  Interventional Pain Management - [facility]    Disclaimer:  This note was prepared using voice recognition system and is likely to have sound alike errors that may have been overlooked even after proof reading.  Please call me with any questions.

## 2023-08-15 NOTE — PATIENT INSTRUCTIONS
Counseling and Referral of Other Preventative  (Italic type indicates deductible and co-insurance are waived)    Patient Name: Srinath Mcknight  Today's Date: 8/15/2023    Health Maintenance       Date Due Completion Date    COVID-19 Vaccine (6 - Pfizer series) 06/16/2023 2/16/2023    Influenza Vaccine (1) 09/01/2023 9/15/2022    Hemoglobin A1c 09/30/2023 3/30/2023    Lipid Panel 10/07/2023 10/7/2022    PROSTATE-SPECIFIC ANTIGEN 11/07/2023 11/7/2022    Diabetes Urine Screening 03/30/2024 3/30/2023    Eye Exam 07/12/2024 7/12/2023    Override on 12/22/2015: Done    Colonoscopy 06/30/2025 6/30/2020    TETANUS VACCINE 05/15/2028 5/15/2018        Orders Placed This Encounter   Procedures    Ambulatory referral/consult to Outpatient Case Management         The following information is provided to all patients.  This information is to help you find resources for any of the problems found today that may be affecting your health:                Living healthy guide: www.[facility].[place].[facility]      Understanding Diabetes: www.diabetes.org      Eating healthy: www.cdc.gov/healthyweight      CDC home safety checklist: www.cdc.gov/steadi/patient.html      Agency on Aging: www.goea.[place].gov      Alcoholics anonymous (AA): www.aa.org      Physical Activity: www.michael.nih.gov/oi2bsqp      Tobacco use: www.quitwithusla.org     Counseling and Referral of Other Preventative  (Italic type indicates deductible and co-insurance are waived)    Patient Name: Srinath Mcknight  Today's Date: 8/15/2023    Health Maintenance       Date Due Completion Date    COVID-19 Vaccine (6 - Pfizer series) 06/16/2023 2/16/2023    Influenza Vaccine (1) 09/01/2023 9/15/2022    Hemoglobin A1c 09/30/2023 3/30/2023    Lipid Panel 10/07/2023 10/7/2022    PROSTATE-SPECIFIC ANTIGEN 11/07/2023 11/7/2022    Diabetes Urine Screening 03/30/2024 3/30/2023    Eye Exam 07/12/2024 7/12/2023    Override on 12/22/2015: Done    Colonoscopy 06/30/2025 6/30/2020    TETANUS  VACCINE 05/15/2028 5/15/2018        Orders Placed This Encounter   Procedures    Ambulatory referral/consult to Outpatient Case Management       The following information is provided to all patients.  This information is to help you find resources for any of the problems found today that may be affecting your health:                Living healthy guide: www.[facility].[place].[facility]      Understanding Diabetes: www.diabetes.org      Eating healthy: www.cdc.gov/healthyweight      [facility] home safety checklist: www.cdc.gov/steadi/patient.html      Agency on Aging: www.goea.[place].[facility]      Alcoholics anonymous (AA): www.aa.org      Physical Activity: www.michael.nih.gov/fi0bzly      Tobacco use: www.quitwithusla.org

## 2023-08-15 NOTE — PROGRESS NOTES
"  Srinath Mcknight presented for a Medicare AWV today. The following components were reviewed and updated:    Medical history  Family History  Social history  Allergies and Current Medications  Health Risk Assessment  Health Maintenance  Care Team    **See Completed Assessments for Annual Wellness visit with in the encounter summary    The following assessments were completed:  Depression Screening  Cognitive function Screening  Timed Get Up Test  Whisper Test          Vitals:    08/15/23 1002   BP: 136/80   BP Location: Left arm   Patient Position: Sitting   Pulse: 66   Resp: 18   SpO2: 99%   Weight: 98.1 kg (216 lb 4.3 oz)   Height: 6' 3" (1.905 m)     Body mass index is 27.03 kg/m².   ]    Physical Exam  Constitutional:       Appearance: Normal appearance.   HENT:      Head: Normocephalic and atraumatic.   Cardiovascular:      Rate and Rhythm: Normal rate and regular rhythm.      Pulses: Normal pulses.      Heart sounds: Normal heart sounds.   Pulmonary:      Effort: Pulmonary effort is normal.      Breath sounds: Normal breath sounds.   Musculoskeletal:         General: Tenderness (right knee) present.      Cervical back: Normal range of motion.      Right lower leg: Edema (trace) present.      Left lower leg: Edema (trace) present.      Comments: Ambulates with a cane. Trace BLE edema, L slightly more than right    Skin:     General: Skin is warm and dry.      Capillary Refill: Capillary refill takes less than 2 seconds.   Neurological:      General: No focal deficit present.      Mental Status: He is alert and oriented to person, place, and time.   Psychiatric:         Mood and Affect: Mood normal.         Thought Content: Thought content normal.         Judgment: Judgment normal.       Diagnoses and health risks identified today and associated recommendations/orders:  1. Encounter for Medicare annual wellness exam  Reviewed and discussed preventive health screenings and vaccinations with the patient.     2. " Type 2 diabetes mellitus with diabetic polyneuropathy, without long-term current use of insulin  Stable. Continue current treatment plan as previously prescribed with your PCP.     Diabetes Management Status    Statin: Taking  ACE/ARB: Taking    Screening or Prevention Patient's value Goal Complete/Controlled?   HgA1C Testing and Control   Lab Results   Component Value Date    HGBA1C 6.2 (H) 03/30/2023      Annually/Less than 8% Yes   Lipid profile : 10/07/2022 Annually Yes   LDL control Lab Results   Component Value Date    LDLCALC 66.4 10/07/2022    Annually/Less than 100 mg/dl  Yes   Nephropathy screening Lab Results   Component Value Date    LABMICR 6.0 03/30/2023     Lab Results   Component Value Date    PROTEINUA Negative 09/12/2019    Annually Yes   Blood pressure BP Readings from Last 1 Encounters:   08/15/23 136/80    Less than 140/90 Yes   Dilated retinal exam : 07/12/2023 Annually Yes   Foot exam   Most Recent Foot Exam Date: Not Found Annually Yes     Lab Results   Component Value Date    HGBA1C 6.2 (H) 03/30/2023    HGBA1C 6.3 (H) 11/14/2022    HGBA1C 6.0 (H) 06/23/2022    EGFRNORACEVR 50.8 (A) 05/23/2023    MICALBCREAT 5.0 03/30/2023    LDLCALC 66.4 10/07/2022     3. Diabetes mellitus with stage 3 chronic kidney disease  Stable. Continue current treatment plan as previously prescribed with your PCP     Lab Results   Component Value Date    CREATININE 1.4 05/23/2023    BUN 16 05/23/2023     05/23/2023    K 4.4 05/23/2023     05/23/2023    CO2 24 05/23/2023       4. Hypertension associated with diabetes  Stable. Continue current treatment plan as previously prescribed with your PCP and Cardiology.     BP Readings from Last 3 Encounters:   08/15/23 136/80   07/18/23 130/62   07/17/23 (!) 145/74     5. Combined hyperlipidemia associated with type 2 diabetes mellitus  Stable. Continue current treatment plan as previously prescribed with your PCP and Cardiology.     6. Aortic calcification  On  statin. Stable. Continue current treatment plan as previously prescribed with your Cardiologist.     7. Restrictive ventilatory defect  8. Diffusion capacity of lung (dl), decreased  Uses albuterol PRN and compliant with aerobic exercise 2-3 times per week. Stable. Continue current treatment plan as previously prescribed with your Pulmonologist, Dr. Machuca.     9. Chronic anemia  Stable. Continue current treatment plan as previously prescribed with your HemOnc.     Lab Results   Component Value Date    WBC 3.99 06/14/2023    HGB 11.7 (L) 06/14/2023    HCT 35.9 (L) 06/14/2023    MCV 88 06/14/2023     06/14/2023     10. Leukopenia, unspecified type  Stable. Continue current treatment plan as previously prescribed with your HemOnc.     11. Sickle cell trait  Stable. Continue current treatment plan as previously prescribed with your HemOnc.     12. Chronic pain of right knee  Received injection 4/2023 with temporary relief but pain has been worsening. He has appt tmrw with pain management. Continue current treatment plan as previously prescribed with your PCP and pain management.     13. Osteoarthritis of spine with radiculopathy, lumbar region  On Lyrica. Stable. Continue current treatment plan as previously prescribed with your pain management specialist.     14. Idiopathic chronic gout of right foot without tophus  On allopurinol. Stable. Continue current treatment plan as previously prescribed with your PCP.     Lab Results   Component Value Date    URICACID 7.4 (H) 12/15/2017     15. Benign prostatic hyperplasia, unspecified whether lower urinary tract symptoms present  Chronic/ stable. Holding Flomax per Cards due to dizziness and dizziness has improved off of medication. Continue current treatment plan as previously prescribed with your PCP    16. Needs assistance with community resources   Referral placed to Case Management.     17. Dependence on other enabling machines and devices   Ambulates with a  cane primarily due to right knee pain. No h/o falls. Gait is steady and appropriate timed up and go test. Patient is aware of fall precautions.     Opioid screen - No Rx // use of Opioids     Substance abuse screen - No substance use per patient //chart review     Provided [place] with a 5-10 year written screening schedule and personal prevention plan. Recommendations were developed using the USPSTF age appropriate recommendations. Education, counseling, and referrals were provided as needed.  After Visit Summary printed and given to patient which includes a list of additional screenings\tests needed.    Follow up in about 1 year (around 8/15/2024).      Harmony Do, OSCAR    I offered to discuss advanced care planning, including how to pick a person who would make decisions for you if you were unable to make them for yourself, called a health care power of , and what kind of decisions you might make such as use of life sustaining treatments such as ventilators and tube feeding when faced with a life limiting illness recorded on a living will that they will need to know. (How you want to be cared for as you near the end of your natural life)     X Patient is interested in learning more about how to make advanced directives.  I provided them paperwork and offered to discuss this with them.

## 2023-08-16 ENCOUNTER — OFFICE VISIT (OUTPATIENT)
Dept: PAIN MEDICINE | Facility: CLINIC | Age: 83
End: 2023-08-16
Payer: MEDICARE

## 2023-08-16 VITALS
WEIGHT: 215.38 LBS | SYSTOLIC BLOOD PRESSURE: 156 MMHG | BODY MASS INDEX: 26.78 KG/M2 | HEART RATE: 63 BPM | RESPIRATION RATE: 14 BRPM | HEIGHT: 75 IN | DIASTOLIC BLOOD PRESSURE: 83 MMHG

## 2023-08-16 DIAGNOSIS — Z98.890 HISTORY OF LUMBAR LAMINECTOMY: ICD-10-CM

## 2023-08-16 DIAGNOSIS — M25.551 RIGHT HIP PAIN: ICD-10-CM

## 2023-08-16 DIAGNOSIS — M25.561 CHRONIC PAIN OF RIGHT KNEE: Primary | ICD-10-CM

## 2023-08-16 DIAGNOSIS — G89.29 CHRONIC PAIN OF RIGHT KNEE: Primary | ICD-10-CM

## 2023-08-16 DIAGNOSIS — Z98.1 HISTORY OF LUMBAR FUSION: ICD-10-CM

## 2023-08-16 PROCEDURE — 1160F RVW MEDS BY RX/DR IN RCRD: CPT | Mod: HCNC,CPTII,S$GLB, | Performed by: PHYSICIAN ASSISTANT

## 2023-08-16 PROCEDURE — 3288F FALL RISK ASSESSMENT DOCD: CPT | Mod: HCNC,CPTII,S$GLB, | Performed by: PHYSICIAN ASSISTANT

## 2023-08-16 PROCEDURE — 1159F PR MEDICATION LIST DOCUMENTED IN MEDICAL RECORD: ICD-10-PCS | Mod: HCNC,CPTII,S$GLB, | Performed by: PHYSICIAN ASSISTANT

## 2023-08-16 PROCEDURE — 1101F PR PT FALLS ASSESS DOC 0-1 FALLS W/OUT INJ PAST YR: ICD-10-PCS | Mod: HCNC,CPTII,S$GLB, | Performed by: PHYSICIAN ASSISTANT

## 2023-08-16 PROCEDURE — 3077F SYST BP >= 140 MM HG: CPT | Mod: HCNC,CPTII,S$GLB, | Performed by: PHYSICIAN ASSISTANT

## 2023-08-16 PROCEDURE — 1125F AMNT PAIN NOTED PAIN PRSNT: CPT | Mod: HCNC,CPTII,S$GLB, | Performed by: PHYSICIAN ASSISTANT

## 2023-08-16 PROCEDURE — 3079F DIAST BP 80-89 MM HG: CPT | Mod: HCNC,CPTII,S$GLB, | Performed by: PHYSICIAN ASSISTANT

## 2023-08-16 PROCEDURE — 99214 OFFICE O/P EST MOD 30 MIN: CPT | Mod: HCNC,S$GLB,, | Performed by: PHYSICIAN ASSISTANT

## 2023-08-16 PROCEDURE — 3288F PR FALLS RISK ASSESSMENT DOCUMENTED: ICD-10-PCS | Mod: HCNC,CPTII,S$GLB, | Performed by: PHYSICIAN ASSISTANT

## 2023-08-16 PROCEDURE — 99214 PR OFFICE/OUTPT VISIT, EST, LEVL IV, 30-39 MIN: ICD-10-PCS | Mod: HCNC,S$GLB,, | Performed by: PHYSICIAN ASSISTANT

## 2023-08-16 PROCEDURE — 99999 PR PBB SHADOW E&M-EST. PATIENT-LVL V: CPT | Mod: PBBFAC,HCNC,, | Performed by: PHYSICIAN ASSISTANT

## 2023-08-16 PROCEDURE — 3077F PR MOST RECENT SYSTOLIC BLOOD PRESSURE >= 140 MM HG: ICD-10-PCS | Mod: HCNC,CPTII,S$GLB, | Performed by: PHYSICIAN ASSISTANT

## 2023-08-16 PROCEDURE — 3079F PR MOST RECENT DIASTOLIC BLOOD PRESSURE 80-89 MM HG: ICD-10-PCS | Mod: HCNC,CPTII,S$GLB, | Performed by: PHYSICIAN ASSISTANT

## 2023-08-16 PROCEDURE — 1101F PT FALLS ASSESS-DOCD LE1/YR: CPT | Mod: HCNC,CPTII,S$GLB, | Performed by: PHYSICIAN ASSISTANT

## 2023-08-16 PROCEDURE — 99999 PR PBB SHADOW E&M-EST. PATIENT-LVL V: ICD-10-PCS | Mod: PBBFAC,HCNC,, | Performed by: PHYSICIAN ASSISTANT

## 2023-08-16 PROCEDURE — 1159F MED LIST DOCD IN RCRD: CPT | Mod: HCNC,CPTII,S$GLB, | Performed by: PHYSICIAN ASSISTANT

## 2023-08-16 PROCEDURE — 1160F PR REVIEW ALL MEDS BY PRESCRIBER/CLIN PHARMACIST DOCUMENTED: ICD-10-PCS | Mod: HCNC,CPTII,S$GLB, | Performed by: PHYSICIAN ASSISTANT

## 2023-08-16 PROCEDURE — 1125F PR PAIN SEVERITY QUANTIFIED, PAIN PRESENT: ICD-10-PCS | Mod: HCNC,CPTII,S$GLB, | Performed by: PHYSICIAN ASSISTANT

## 2023-08-16 RX ORDER — LIDOCAINE AND PRILOCAINE 25; 25 MG/G; MG/G
CREAM TOPICAL
Qty: 60 G | Refills: 0 | Status: SHIPPED | OUTPATIENT
Start: 2023-08-16 | End: 2023-10-12

## 2023-08-16 RX ORDER — DICLOFENAC SODIUM 10 MG/G
2 GEL TOPICAL 4 TIMES DAILY PRN
Qty: 200 G | Refills: 2 | Status: SHIPPED | OUTPATIENT
Start: 2023-08-16

## 2023-08-24 ENCOUNTER — OUTPATIENT CASE MANAGEMENT (OUTPATIENT)
Dept: ADMINISTRATIVE | Facility: OTHER | Age: 83
End: 2023-08-24
Payer: MEDICARE

## 2023-08-25 ENCOUNTER — OUTPATIENT CASE MANAGEMENT (OUTPATIENT)
Dept: ADMINISTRATIVE | Facility: OTHER | Age: 83
End: 2023-08-25
Payer: MEDICARE

## 2023-08-25 NOTE — PROGRESS NOTES
Outpatient Care Management   - Low Risk Patient Assessment    Patient: Srinath Mcknight  MRN:  [id number]  Date of Service:  8/25/2023  Completed by:  Valencia Corona LCSW  Referral Date: 08/15/2023    Reason for Visit   Patient presents with    Social Work Assessment - Low/Mod Risk     8/25/2023    Case Closure     Brief Summary: Sw received referral for patient from Harmony Do NP. Sw completed assessment with Pt via telephone. Pt reports living with spouse and reports being independent with ADLs. Pt uses a cane to ambulate. Pt denies having any difficulty affording food and housing. Pt reports some challenges paying medical related bills such as co-pays. Pt reports he has attempted to apply for gov't assistance in past and has been denied due to reported household income. Pt reports spouse and his income is over 3000 per month. Pt reports he is managing his co-payments with [facility] and states he is currently on payment plan. Pt provides his own transportation for medical appointments. Pt reports all needs are met and declines community resources at this time. Pt advised to communicate with PCP if needs arise in future and Pt voiced understanding.

## 2023-08-26 DIAGNOSIS — G47.52 CONTROLLED REM SLEEP BEHAVIOR DISORDER: ICD-10-CM

## 2023-08-30 ENCOUNTER — TELEPHONE (OUTPATIENT)
Dept: PULMONOLOGY | Facility: CLINIC | Age: 83
End: 2023-08-30
Payer: MEDICARE

## 2023-08-30 RX ORDER — CLONAZEPAM 0.5 MG/1
0.5 TABLET ORAL NIGHTLY
Qty: 30 TABLET | Refills: 3 | Status: SHIPPED | OUTPATIENT
Start: 2023-08-30 | End: 2023-10-11

## 2023-08-30 NOTE — TELEPHONE ENCOUNTER
----- Message from Jojo Cameron sent at 8/30/2023  7:59 AM CDT -----  .Type:  RX Refill Request    Who Called: Alireza Mcknight   Refill or New Rx:refill  RX Name and Strength:clonazePAM (KLONOPIN) 0.5 MG tablet  How is the patient currently taking it? (ex. 1XDay):daily  Is this a 30 day or 90 day RX:90    Preferred Pharmacy with phone number: .  [id number] - [place], [place] - [address] [facility]  [address] [address] [address]  Phone: [phone] Fax: [phone]       Local or Mail Order:local  Ordering Provider:Brigette  Would the patient rather a call back or a response via [facility]? Call back  Best Call Back Number:.[phone]   Additional Information:

## 2023-09-01 DIAGNOSIS — M25.531 RIGHT WRIST PAIN: Primary | ICD-10-CM

## 2023-09-05 DIAGNOSIS — G47.52 CONTROLLED REM SLEEP BEHAVIOR DISORDER: ICD-10-CM

## 2023-09-06 ENCOUNTER — OFFICE VISIT (OUTPATIENT)
Dept: ORTHOPEDICS | Facility: CLINIC | Age: 83
End: 2023-09-06
Payer: MEDICARE

## 2023-09-06 ENCOUNTER — HOSPITAL ENCOUNTER (OUTPATIENT)
Dept: RADIOLOGY | Facility: HOSPITAL | Age: 83
Discharge: HOME | End: 2023-09-06
Attending: ORTHOPAEDIC SURGERY
Payer: MEDICARE

## 2023-09-06 VITALS — WEIGHT: 215 LBS | HEIGHT: 75 IN | BODY MASS INDEX: 26.73 KG/M2

## 2023-09-06 DIAGNOSIS — M77.8 EXTENSOR CARPI ULNARIS TENDINITIS: Primary | ICD-10-CM

## 2023-09-06 DIAGNOSIS — M25.531 RIGHT WRIST PAIN: ICD-10-CM

## 2023-09-06 PROCEDURE — 99213 OFFICE O/P EST LOW 20 MIN: CPT | Mod: HCNC,25,S$GLB, | Performed by: ORTHOPAEDIC SURGERY

## 2023-09-06 PROCEDURE — 99999 PR PBB SHADOW E&M-EST. PATIENT-LVL III: ICD-10-PCS | Mod: PBBFAC,HCNC,, | Performed by: ORTHOPAEDIC SURGERY

## 2023-09-06 PROCEDURE — 3288F PR FALLS RISK ASSESSMENT DOCUMENTED: ICD-10-PCS | Mod: HCNC,CPTII,S$GLB, | Performed by: ORTHOPAEDIC SURGERY

## 2023-09-06 PROCEDURE — 20605 INTERMEDIATE JOINT ASPIRATION/INJECTION: R RADIOCARPAL: ICD-10-PCS | Mod: HCNC,RT,S$GLB, | Performed by: ORTHOPAEDIC SURGERY

## 2023-09-06 PROCEDURE — 73110 XR WRIST COMPLETE 3 VIEWS RIGHT: ICD-10-PCS | Mod: 26,HCNC,RT, | Performed by: RADIOLOGY

## 2023-09-06 PROCEDURE — 1125F AMNT PAIN NOTED PAIN PRSNT: CPT | Mod: HCNC,CPTII,S$GLB, | Performed by: ORTHOPAEDIC SURGERY

## 2023-09-06 PROCEDURE — 1160F RVW MEDS BY RX/DR IN RCRD: CPT | Mod: HCNC,CPTII,S$GLB, | Performed by: ORTHOPAEDIC SURGERY

## 2023-09-06 PROCEDURE — 1101F PR PT FALLS ASSESS DOC 0-1 FALLS W/OUT INJ PAST YR: ICD-10-PCS | Mod: HCNC,CPTII,S$GLB, | Performed by: ORTHOPAEDIC SURGERY

## 2023-09-06 PROCEDURE — 73110 X-RAY EXAM OF WRIST: CPT | Mod: 26,HCNC,RT, | Performed by: RADIOLOGY

## 2023-09-06 PROCEDURE — 1101F PT FALLS ASSESS-DOCD LE1/YR: CPT | Mod: HCNC,CPTII,S$GLB, | Performed by: ORTHOPAEDIC SURGERY

## 2023-09-06 PROCEDURE — 1159F MED LIST DOCD IN RCRD: CPT | Mod: HCNC,CPTII,S$GLB, | Performed by: ORTHOPAEDIC SURGERY

## 2023-09-06 PROCEDURE — 1125F PR PAIN SEVERITY QUANTIFIED, PAIN PRESENT: ICD-10-PCS | Mod: HCNC,CPTII,S$GLB, | Performed by: ORTHOPAEDIC SURGERY

## 2023-09-06 PROCEDURE — 99999 PR PBB SHADOW E&M-EST. PATIENT-LVL III: CPT | Mod: PBBFAC,HCNC,, | Performed by: ORTHOPAEDIC SURGERY

## 2023-09-06 PROCEDURE — 73110 X-RAY EXAM OF WRIST: CPT | Mod: TC,HCNC,RT

## 2023-09-06 PROCEDURE — 1159F PR MEDICATION LIST DOCUMENTED IN MEDICAL RECORD: ICD-10-PCS | Mod: HCNC,CPTII,S$GLB, | Performed by: ORTHOPAEDIC SURGERY

## 2023-09-06 PROCEDURE — 1160F PR REVIEW ALL MEDS BY PRESCRIBER/CLIN PHARMACIST DOCUMENTED: ICD-10-PCS | Mod: HCNC,CPTII,S$GLB, | Performed by: ORTHOPAEDIC SURGERY

## 2023-09-06 PROCEDURE — 3288F FALL RISK ASSESSMENT DOCD: CPT | Mod: HCNC,CPTII,S$GLB, | Performed by: ORTHOPAEDIC SURGERY

## 2023-09-06 PROCEDURE — 20605 DRAIN/INJ JOINT/BURSA W/O US: CPT | Mod: HCNC,RT,S$GLB, | Performed by: ORTHOPAEDIC SURGERY

## 2023-09-06 PROCEDURE — 99213 PR OFFICE/OUTPT VISIT, EST, LEVL III, 20-29 MIN: ICD-10-PCS | Mod: HCNC,25,S$GLB, | Performed by: ORTHOPAEDIC SURGERY

## 2023-09-06 RX ORDER — TRIAMCINOLONE ACETONIDE 40 MG/ML
40 INJECTION, SUSPENSION INTRA-ARTICULAR; INTRAMUSCULAR
Status: DISCONTINUED | OUTPATIENT
Start: 2023-09-06 | End: 2023-09-06 | Stop reason: HOSPADM

## 2023-09-06 RX ADMIN — TRIAMCINOLONE ACETONIDE 40 MG: 40 INJECTION, SUSPENSION INTRA-ARTICULAR; INTRAMUSCULAR at 10:09

## 2023-09-06 NOTE — PROGRESS NOTES
Subjective:     Patient ID: Srinath Mcknight is a 82 y.o. male.    Chief Complaint: No chief complaint on file.      HPI:  The patient is an 82-year-old male who had a right wrist radiocarpal injection 05/11/2021 with good results.  He has pain about the right wrist extensor carpi ulnaris tendon at this point.  He does not have recurrence of his radiocarpal arthritis    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    Trouble in sleeping     Type 2 diabetes mellitus with diabetic polyneuropathy, without long-term current use of insulin     Urinary incontinence     Uses hearing aid     bilat     Past Surgical History:   Procedure Laterality Date    BACK SURGERY  2019    COLONOSCOPY N/A 6/30/2020    Procedure: COLONOSCOPY;  Surgeon: Joseph Iraheta MD;  Location: [facility] [place] [facility];  Service: Endoscopy;  Laterality: N/A;    ESOPHAGOGASTRODUODENOSCOPY N/A 6/30/2020    Procedure: EGD (ESOPHAGOGASTRODUODENOSCOPY);  Surgeon: Joseph Iraheta MD;  Location: [facility] [place] [facility];  Service: Endoscopy;  Laterality: N/A;    HERNIA REPAIR Bilateral 04/18/2017    INJECTION OF JOINT Right 4/14/2023    Procedure: right Synvisc Knee injection;  Surgeon: Hossein Brooks MD;  Location: [facility] [place] [facility]IN [facility]GT;  Service: Pain Management;  Laterality: Right;    JOINT REPLACEMENT Left 10/09/2017    L YAHIR Alcocer    LAMINECTOMY  07/22/2016    left elbow  10/2017    procedure to  remove gout    lumbar foraminotomy  03/2018    REVISION TOTAL HIP ARTHROPLASTY Left 9/11/2018    Procedure: REVISION, TOTAL ARTHROPLASTY, HIP;  Surgeon: Albaro Alcocer Sr., MD;  Location: [facility];  Service: Orthopedics;  Laterality: Left;    ROTATOR CUFF REPAIR Left 2006    SHOULDER ARTHROSCOPY W/ ROTATOR CUFF REPAIR Right 2009     Family History   Problem Relation Age of Onset    Hypertension Mother     Heart disease Mother     Diabetes Mother     Hypertension Father     Heart disease Father     Diabetes Father     Stroke Father     Diabetes Sister     Cancer Brother 50        pancreas    Drug abuse Brother     Prostate cancer Neg Hx     Macular degeneration Neg Hx     Retinal detachment Neg Hx     Strabismus Neg Hx     Glaucoma Neg Hx     Blindness Neg Hx     Amblyopia Neg Hx     Kidney disease Neg Hx     Mental illness Neg Hx     Mental retardation Neg Hx     COPD Neg Hx     Asthma Neg Hx      Social History     Socioeconomic History    Marital status:     Number of children: 0    Highest education level: Master's degree (e.g., MA, MS, Olegario, MEd, MSW, CARMEL)   Occupational History    Occupation: retired     Comment: teacher   Tobacco Use    Smoking status: Never    Smokeless tobacco: Never   Substance and Sexual Activity    Alcohol use: Not Currently    Drug use: No    Sexual activity: Yes     Partners: Female     Birth control/protection: Condom   Social History Narrative    . No children. Retired but some part time work - 4 hours a day. 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 Caffeine intake - sugar free cola, Rare coffee intake. Still drives. Does have a Living Will . Has a nephew Jt Gayle, lives in [place]. Has a friend Karina Rossi, locally who could help him.      Social Determinants of Health     Financial Resource Strain: Medium Risk (8/15/2023)    Overall Financial Resource Strain (CARDIA)     Difficulty of Paying Living Expenses: Somewhat hard   Food Insecurity: No Food Insecurity  (8/15/2023)    Hunger Vital Sign     Worried About Running Out of Food in the Last Year: Never true     Ran Out of Food in the Last Year: Never true   Transportation Needs: No Transportation Needs (8/15/2023)    PRAPARE - Transportation     Lack of Transportation (Medical): No     Lack of Transportation (Non-Medical): No   Physical Activity: Insufficiently Active (8/15/2023)    Exercise Vital Sign     Days of Exercise per Week: 2 days     Minutes of Exercise per Session: 30 min   Stress: No Stress Concern Present (8/15/2023)    [employer] - Occupational Stress Questionnaire     Feeling of Stress : Not at all   Social Connections: Moderately Isolated (8/15/2023)    Social Connection and Isolation Panel [NHANES]     Frequency of Communication with Friends and Family: More than three times a week     Frequency of Social Gatherings with Friends and Family: Once a week     Attends Yazidism Services: Never     Active Member of Clubs or Organizations: No     Attends Club or Organization Meetings: Never     Marital Status:    Housing Stability: Low Risk  (8/15/2023)    Housing Stability Vital Sign     Unable to Pay for Housing in the Last Year: No     Number of Places Lived in the Last Year: 1     Unstable Housing in the Last Year: No     Medication List with Changes/Refills   Current Medications    ALBUTEROL (VENTOLIN HFA) 90 MCG/ACTUATION INHALER    Inhale 2 puffs into the lungs every 6 (six) hours as needed for Wheezing or Shortness of Breath. Rescue    ALLOPURINOL (ZYLOPRIM) 100 MG TABLET    TAKE 1 TABLET EVERY DAY    BISACODYL (DULCOLAX) 10 MG SUPP        BLOOD GLUCOSE CONTROL, LOW SOLN    by Misc.(Non-Drug; Combo Route) route.    BLOOD SUGAR DIAGNOSTIC (TRUE METRIX GLUCOSE TEST STRIP) STRP    Use as directed to check sugars    BLOOD-GLUCOSE METER (TRUE METRIX AIR GLUCOSE METER) KIT    USE AS DIRECTED    CETIRIZINE (ZYRTEC) 10 MG TABLET    Take 1 tablet (10 mg total) by mouth every  "evening. for 14 days    CIPROFLOXACIN HCL (CILOXAN) 0.3 % OPHTHALMIC SOLUTION    INSTILL 1 DROP IN RIGHT EYE TWICE DAILY FOR 1 WEEK THEN STOP    CLONAZEPAM (KLONOPIN) 0.5 MG TABLET    TAKE 1 TABLET BY MOUTH EVERY EVENING    CYANOCOBALAMIN, VITAMIN B-12, 1,000 MCG SUBL    Place 1,000 mcg under the tongue once daily.    DICLOFENAC SODIUM (VOLTAREN) 1 % GEL    Apply 2 g topically 4 (four) times daily as needed (pain).    FLUTICASONE PROPIONATE (FLONASE) 50 MCG/ACTUATION NASAL SPRAY    1 spray (50 mcg total) by Each Nostril route once daily.    GARLIC EXTRACT ORAL    Take 1 tablet by mouth once daily. Per [facility]    JARDIANCE 10 MG TABLET        KETOROLAC 0.5% (ACULAR) 0.5 % DROP    Place 1 drop into the right eye 4 (four) times daily.    LANCETS MISC    Use as directed to check sugars    LEVOCETIRIZINE (XYZAL) 5 MG TABLET    Take 1 tablet (5 mg total) by mouth every evening.    LEXISCAN 0.4 MG/5 ML SYRG        LIDOCAINE-PRILOCAINE (EMLA) CREAM    Apply topically up to 4x per day to affected area for pain relief    LOSARTAN (COZAAR) 50 MG TABLET    Take 1 tablet (50 mg total) by mouth once daily.    MELATONIN 10 MG TAB    Take 1 tablet (10 mg total) by mouth every evening.    METFORMIN (GLUCOPHAGE) 1000 MG TABLET    TAKE 1 TABLET TWICE DAILY WITH MEALS    METOPROLOL SUCCINATE (TOPROL-XL) 50 MG 24 HR TABLET    TAKE 1 TABLET EVERY DAY    MOMETASONE 0.1% (ELOCON) 0.1 % CREAM    Apply topically once daily.    MOXIFLOXACIN (VIGAMOX) 0.5 % OPHTHALMIC SOLUTION        MULTIVITAMIN (THERAGRAN) PER TABLET    Take 1 tablet by mouth once daily.    OLOPATADINE (PATANOL) 0.1 % OPHTHALMIC SOLUTION    Place 1 drop into the right eye 2 (two) times daily.    PAPAVERINE 30 MG/ML INJECTION    Inject 0.3 ml of trimix solution prn ED max once daily  Prepare 10ml of trimix solution containing:    Papaverine 30mg/ml    Phentolamine 1 mg/ml    Alprostadil 10mcg/ml  Dispense 10ml per refill  Qs syringes 1cc/30g/0.5" and alcohol swabs " dispense as needed for Intracavernosal injection    PFIZER COVID BIVAL,12Y UP,,PF, 30 MCG/0.3 ML INJECTION        PRAMIPEXOLE (MIRAPEX) 0.125 MG TABLET    TAKE 1 TABLET( 0.125 MG) BY MOUTH EVERY EVENING    PRAVASTATIN (PRAVACHOL) 40 MG TABLET    TAKE 1 TABLET EVERY DAY    PREDNISOLONE ACETATE (PRED FORTE) 1 % DRPS        PREGABALIN (LYRICA) 75 MG CAPSULE    Take 1-2 capsules ( mg total) by mouth every evening.    REFRESH OPTIVE 0.5-0.9 % DROP    SMARTSI Drop(s) In Eye(s) PRN    TAMSULOSIN (FLOMAX) 0.4 MG CAP    Take 1 capsule (0.4 mg total) by mouth once daily.     Review of patient's allergies indicates:   Allergen Reactions    Sulfa (sulfonamide antibiotics)      Can't recall from      Review of Systems   Constitutional: Negative for malaise/fatigue.   HENT:  Negative for hearing loss.    Eyes:  Negative for double vision and visual disturbance.   Cardiovascular:  Negative for chest pain.   Respiratory:  Positive for wheezing. Negative for shortness of breath.    Endocrine: Negative for cold intolerance.   Hematologic/Lymphatic: Does not bruise/bleed easily.   Skin:  Negative for poor wound healing and suspicious lesions.   Musculoskeletal:  Positive for arthritis, back pain, gout, joint pain and joint swelling.   Gastrointestinal:  Negative for nausea and vomiting.   Genitourinary:  Positive for frequency, hesitancy, incomplete emptying and nocturia. Negative for dysuria.   Neurological:  Positive for loss of balance. Negative for numbness, paresthesias and sensory change.   Psychiatric/Behavioral:  Negative for depression, memory loss and substance abuse. The patient is not nervous/anxious.    Allergic/Immunologic: Negative for persistent infections.       Objective:   Body mass index is 26.87 kg/m².  There were no vitals filed for this visit.             General    Constitutional: He is oriented to person, place, and time. He appears well-developed and well-nourished. No distress.   HENT:   Head:  Normocephalic.   Eyes: EOM are normal.   Pulmonary/Chest: Effort normal.   Neurological: He is oriented to person, place, and time.   Psychiatric: He has a normal mood and affect.             Right Hand/Wrist Exam     Inspection   Scars: Wrist - absent Hand -  absent  Effusion: Wrist - absent Hand -  absent    Pain   Wrist - The patient exhibits pain of the extensory musculature.    Other     Neuorologic Exam    Median Distribution: normal  Ulnar Distribution: normal  Radial Distribution: normal    Comments:  The patient has tenderness about the right extensor carpi ulnaris without obvious subluxation          Vascular Exam       Capillary Refill  Right Hand: normal capillary refill          Relevant imaging results reviewed and interpreted by me, discussed with the patient and / or family today radiographs of the right wrist showed scattered osteoarthritic change  Assessment:     Encounter Diagnosis   Name Primary?    Extensor carpi ulnaris tendinitis Yes        Plan:     The patient injected right wrist extensor carpi ulnaris with 1 cc Kenalog 1 cc 2% plain lidocaine under sterile technique.  He has a wrist splint.  He will return on an as needed basis                Disclaimer: This note was prepared using a voice recognition system and is likely to have sound alike errors within the text.

## 2023-09-06 NOTE — PROCEDURES
Intermediate Joint Aspiration/Injection: R radiocarpal    Date/Time: 9/6/2023 10:15 AM    Performed by: Yeison Douglas MD  Authorized by: Yeison Douglas MD    Consent Done?:  Yes (Verbal)  Indications:  Arthritis  Timeout: Prior to procedure the correct patient, procedure, and site was verified      Location:  Wrist  Site:  R radiocarpal  Prep: Patient was prepped and draped in usual sterile fashion    Ultrasonic Guidance for needle placement: No  Needle size:  25 G  Approach:  Anteromedial  Medications:  40 mg triamcinolone acetonide 40 mg/mL

## 2023-09-17 DIAGNOSIS — E11.59 HYPERTENSION ASSOCIATED WITH DIABETES: ICD-10-CM

## 2023-09-17 DIAGNOSIS — I15.2 HYPERTENSION ASSOCIATED WITH DIABETES: ICD-10-CM

## 2023-09-17 NOTE — TELEPHONE ENCOUNTER
Care Due:                  Date            Visit Type   Department     Provider  --------------------------------------------------------------------------------                                EP -                              PRIMARY      [facility] INTERNAL  Last Visit: 12-      CARE ([facility])   RUDDY Wilder                              [facility]                              PRIMARY      [facility] INTERNAL  Next Visit: 10-      CARE ([facility])   MEDICINE       Yeison Wilder                                                            Last  Test          Frequency    Reason                     Performed    Due Date  --------------------------------------------------------------------------------    CMP.........  12 months..  allopurinoL, pravastatin.  12- 12-    HBA1C.......  6 months...  metFORMIN................  03- 09-    Lipid Panel.  12 months..  pravastatin..............  10-   10-    Uric Acid...  12 months..  allopurinoL..............  Not Found    Overdue    [employer] Embedded Care Due Messages. Reference number: [id number].   9/17/2023 2:09:33 AM CDT

## 2023-09-18 RX ORDER — LOSARTAN POTASSIUM 50 MG/1
50 TABLET ORAL DAILY
Qty: 90 TABLET | Refills: 0 | Status: SHIPPED | OUTPATIENT
Start: 2023-09-18 | End: 2024-02-12

## 2023-09-18 RX ORDER — METFORMIN HYDROCHLORIDE 1000 MG/1
TABLET ORAL
Qty: 180 TABLET | Refills: 0 | Status: SHIPPED | OUTPATIENT
Start: 2023-09-18 | End: 2024-02-12

## 2023-09-18 NOTE — TELEPHONE ENCOUNTER
Refill Routing Note   Medication(s) are not appropriate for processing by [facility] for the following reason(s):      Required vitals abnormal  BP Elevated  Rx Due for refills >6 mos ago- Rx has been filled for 2qd.  Pt was changed to 1qd by provider, but never filled.  Pended for provider review.    [facility] action(s):  Defer Care Due:  Labs due  CMP-12/1/23  A1C 9/27/23  Lipid Panel- 10/2/23  Uric acid overdue     Medication Therapy Plan: Rx has been filled for 2qd.  Pt was changed to 1qd by provider, but never filled.  Pended for provider review.      Appointments  past 12m or future 3m with PCP    Date Provider   Last Visit   12/29/2022 Yeison Wilder MD   Next Visit   10/12/2023 Yeison Wilder MD   ED visits in past 90 days: 0        Note composed:9:57 AM 09/18/2023

## 2023-09-19 DIAGNOSIS — M25.561 CHRONIC PAIN OF BOTH KNEES: Primary | ICD-10-CM

## 2023-09-19 DIAGNOSIS — M25.562 CHRONIC PAIN OF BOTH KNEES: Primary | ICD-10-CM

## 2023-09-19 DIAGNOSIS — G89.29 CHRONIC PAIN OF BOTH KNEES: Primary | ICD-10-CM

## 2023-10-11 DIAGNOSIS — G47.52 CONTROLLED REM SLEEP BEHAVIOR DISORDER: ICD-10-CM

## 2023-10-11 RX ORDER — CLONAZEPAM 0.5 MG/1
0.5 TABLET ORAL NIGHTLY
Qty: 30 TABLET | Refills: 3 | Status: SHIPPED | OUTPATIENT
Start: 2023-10-11 | End: 2023-11-10

## 2023-10-11 RX ORDER — CLONAZEPAM 0.5 MG/1
0.5 TABLET ORAL DAILY
Qty: 30 TABLET | Refills: 3 | OUTPATIENT
Start: 2023-10-11

## 2023-10-12 ENCOUNTER — HOSPITAL ENCOUNTER (OUTPATIENT)
Dept: RADIOLOGY | Facility: HOSPITAL | Age: 83
Discharge: HOME | End: 2023-10-12
Attending: INTERNAL MEDICINE
Payer: MEDICARE

## 2023-10-12 ENCOUNTER — OFFICE VISIT (OUTPATIENT)
Dept: INTERNAL MEDICINE | Facility: CLINIC | Age: 83
End: 2023-10-12
Payer: MEDICARE

## 2023-10-12 VITALS
WEIGHT: 214.06 LBS | DIASTOLIC BLOOD PRESSURE: 68 MMHG | BODY MASS INDEX: 26.76 KG/M2 | HEART RATE: 73 BPM | OXYGEN SATURATION: 98 % | SYSTOLIC BLOOD PRESSURE: 124 MMHG | TEMPERATURE: 97 F

## 2023-10-12 DIAGNOSIS — E11.42 TYPE 2 DIABETES MELLITUS WITH DIABETIC POLYNEUROPATHY, WITHOUT LONG-TERM CURRENT USE OF INSULIN: Chronic | ICD-10-CM

## 2023-10-12 DIAGNOSIS — E11.69 COMBINED HYPERLIPIDEMIA ASSOCIATED WITH TYPE 2 DIABETES MELLITUS: Chronic | ICD-10-CM

## 2023-10-12 DIAGNOSIS — I15.2 HYPERTENSION ASSOCIATED WITH DIABETES: ICD-10-CM

## 2023-10-12 DIAGNOSIS — K59.09 CHRONIC CONSTIPATION: ICD-10-CM

## 2023-10-12 DIAGNOSIS — E11.59 HYPERTENSION ASSOCIATED WITH DIABETES: ICD-10-CM

## 2023-10-12 DIAGNOSIS — N18.30 DIABETES MELLITUS WITH STAGE 3 CHRONIC KIDNEY DISEASE: ICD-10-CM

## 2023-10-12 DIAGNOSIS — E78.2 COMBINED HYPERLIPIDEMIA ASSOCIATED WITH TYPE 2 DIABETES MELLITUS: Chronic | ICD-10-CM

## 2023-10-12 DIAGNOSIS — E11.22 DIABETES MELLITUS WITH STAGE 3 CHRONIC KIDNEY DISEASE: ICD-10-CM

## 2023-10-12 DIAGNOSIS — Z00.00 ROUTINE GENERAL MEDICAL EXAMINATION AT HEALTH CARE FACILITY: Primary | ICD-10-CM

## 2023-10-12 PROBLEM — D72.819 LEUKOPENIA: Status: RESOLVED | Noted: 2018-08-14 | Resolved: 2023-10-12

## 2023-10-12 PROBLEM — R94.2 RESTRICTIVE VENTILATORY DEFECT: Status: RESOLVED | Noted: 2022-01-05 | Resolved: 2023-10-12

## 2023-10-12 PROBLEM — D64.9 CHRONIC ANEMIA: Status: RESOLVED | Noted: 2018-07-27 | Resolved: 2023-10-12

## 2023-10-12 PROBLEM — Z74.09 IMPAIRED FUNCTIONAL MOBILITY, BALANCE, AND ENDURANCE: Status: RESOLVED | Noted: 2021-11-22 | Resolved: 2023-10-12

## 2023-10-12 LAB
ALBUMIN/CREAT UR: 3.9 UG/MG (ref 0–30)
CREAT UR-MCNC: 127 MG/DL (ref 23–375)
MICROALBUMIN UR DL<=1MG/L-MCNC: 5 UG/ML

## 2023-10-12 PROCEDURE — 3074F SYST BP LT 130 MM HG: CPT | Mod: HCNC,CPTII,S$GLB, | Performed by: INTERNAL MEDICINE

## 2023-10-12 PROCEDURE — 1125F AMNT PAIN NOTED PAIN PRSNT: CPT | Mod: HCNC,CPTII,S$GLB, | Performed by: INTERNAL MEDICINE

## 2023-10-12 PROCEDURE — 1101F PT FALLS ASSESS-DOCD LE1/YR: CPT | Mod: HCNC,CPTII,S$GLB, | Performed by: INTERNAL MEDICINE

## 2023-10-12 PROCEDURE — 74019 XR ABDOMEN FLAT AND ERECT: ICD-10-PCS | Mod: 26,HCNC,, | Performed by: RADIOLOGY

## 2023-10-12 PROCEDURE — 74019 RADEX ABDOMEN 2 VIEWS: CPT | Mod: 26,HCNC,, | Performed by: RADIOLOGY

## 2023-10-12 PROCEDURE — 3078F DIAST BP <80 MM HG: CPT | Mod: HCNC,CPTII,S$GLB, | Performed by: INTERNAL MEDICINE

## 2023-10-12 PROCEDURE — 99999 PR PBB SHADOW E&M-EST. PATIENT-LVL V: ICD-10-PCS | Mod: PBBFAC,HCNC,, | Performed by: INTERNAL MEDICINE

## 2023-10-12 PROCEDURE — 3078F PR MOST RECENT DIASTOLIC BLOOD PRESSURE < 80 MM HG: ICD-10-PCS | Mod: HCNC,CPTII,S$GLB, | Performed by: INTERNAL MEDICINE

## 2023-10-12 PROCEDURE — 3288F FALL RISK ASSESSMENT DOCD: CPT | Mod: HCNC,CPTII,S$GLB, | Performed by: INTERNAL MEDICINE

## 2023-10-12 PROCEDURE — 82043 UR ALBUMIN QUANTITATIVE: CPT | Mod: HCNC | Performed by: INTERNAL MEDICINE

## 2023-10-12 PROCEDURE — 1101F PR PT FALLS ASSESS DOC 0-1 FALLS W/OUT INJ PAST YR: ICD-10-PCS | Mod: HCNC,CPTII,S$GLB, | Performed by: INTERNAL MEDICINE

## 2023-10-12 PROCEDURE — 1159F PR MEDICATION LIST DOCUMENTED IN MEDICAL RECORD: ICD-10-PCS | Mod: HCNC,CPTII,S$GLB, | Performed by: INTERNAL MEDICINE

## 2023-10-12 PROCEDURE — 99999 PR PBB SHADOW E&M-EST. PATIENT-LVL V: CPT | Mod: PBBFAC,HCNC,, | Performed by: INTERNAL MEDICINE

## 2023-10-12 PROCEDURE — 1160F RVW MEDS BY RX/DR IN RCRD: CPT | Mod: HCNC,CPTII,S$GLB, | Performed by: INTERNAL MEDICINE

## 2023-10-12 PROCEDURE — 3288F PR FALLS RISK ASSESSMENT DOCUMENTED: ICD-10-PCS | Mod: HCNC,CPTII,S$GLB, | Performed by: INTERNAL MEDICINE

## 2023-10-12 PROCEDURE — 99397 PER PM REEVAL EST PAT 65+ YR: CPT | Mod: HCNC,S$GLB,, | Performed by: INTERNAL MEDICINE

## 2023-10-12 PROCEDURE — 74019 RADEX ABDOMEN 2 VIEWS: CPT | Mod: TC,HCNC,FY,PO

## 2023-10-12 PROCEDURE — 1125F PR PAIN SEVERITY QUANTIFIED, PAIN PRESENT: ICD-10-PCS | Mod: HCNC,CPTII,S$GLB, | Performed by: INTERNAL MEDICINE

## 2023-10-12 PROCEDURE — 1159F MED LIST DOCD IN RCRD: CPT | Mod: HCNC,CPTII,S$GLB, | Performed by: INTERNAL MEDICINE

## 2023-10-12 PROCEDURE — 99397 PR PREVENTIVE VISIT,EST,65 & OVER: ICD-10-PCS | Mod: HCNC,S$GLB,, | Performed by: INTERNAL MEDICINE

## 2023-10-12 PROCEDURE — 3074F PR MOST RECENT SYSTOLIC BLOOD PRESSURE < 130 MM HG: ICD-10-PCS | Mod: HCNC,CPTII,S$GLB, | Performed by: INTERNAL MEDICINE

## 2023-10-12 PROCEDURE — 1160F PR REVIEW ALL MEDS BY PRESCRIBER/CLIN PHARMACIST DOCUMENTED: ICD-10-PCS | Mod: HCNC,CPTII,S$GLB, | Performed by: INTERNAL MEDICINE

## 2023-10-12 RX ORDER — SODIUM, POTASSIUM,MAG SULFATES 17.5-3.13G
1 SOLUTION, RECONSTITUTED, ORAL ORAL ONCE
Qty: 354 ML | Refills: 0 | Status: SHIPPED | OUTPATIENT
Start: 2023-10-12 | End: 2023-10-12

## 2023-10-12 NOTE — PATIENT INSTRUCTIONS
Patient Education       Guide to Eating When You Have Diabetes   About this topic   This guide will help you control your blood sugar along with exercise and the drugs you are taking. You want to have a balanced amount of carbohydrates, fats, and protein in your meal. Following these diet guidelines may also help control your blood pressure and cholesterol.     What will the results be?   When you follow these diet guidelines, your blood sugar may be easier to keep within the goal blood sugar ranges. Ask your doctor for your goal blood sugar ranges.  What changes to diet are needed?   You need to balance how much carbohydrate, fat, and protein is in your meals. You also need to control the amount or portion size of the food you eat. Eat meals at about the same time every day. Do not skip a meal. Talk to your dietitian about making a personal meal plan for you.     Who should use this diet?   This diet is helpful to people with high blood sugar or diabetes.   What foods are good to eat?   Whole grains like:  1/3 cup (80 grams) brown rice  1/3 cup (80 grams) wild rice  1/3 cup (80 grams) whole wheat pasta  1 slice whole wheat or whole grain bread  3/4 cup (180 grams) high-fiber cereal  1/2 cup (120 grams) cooked oatmeal  1/2 (120 grams) English muffin  Fruits and vegetables like:  1/2 cup (120 grams) sweet potatoes  1/2 cup (120 grams) cooked vegetables, like squash, green beans, cauliflower, carrots, and cabbage  1 cup (240 grams) raw vegetables or salad greens  1 small apples or oranges  1/2 cup (120 grams) unsweetened fruit juice  Proteins like:  1 ounce (30 grams) lean beef or pork  1 ounce (30 grams) chicken, skin removed  1 ounce (30 grams) turkey, skin removed  1 ounce (30 grams) fish  1 ounce (30 grams) low-fat cheese or lunch meat  1/2 cup (120 grams) cooked beans ? black, kidney, chickpeas, or lentils  1 whole egg  What foods should be limited or avoided?   High fat or processed foods  like:  Moya  Sausage  Hot dogs  Processed snacks  Fats and oils like:  Margarine  Salad dressings  Foods that are high in salt like:  Table salt  Salted breads, rolls, crackers  Commercially-prepared potatoes and vegetable mixes  Canned vegetables and juices  Canned soups  Smoked, cured, or salted meats  Starches that are not whole grain like:  White rice  White potatoes  French fries  Pasta  White bread  Sugary cereals  Instant oatmeal  Baked goods, pastries  Croissants  What can be done to prevent this health problem?   Type 1 diabetes is a lifelong problem and you cannot prevent it. Type 2 diabetes can be prevented or delayed with lifestyle changes. You can still lead a normal life. Diabetes can be managed through diet, exercise, and drugs. Family members and friends can help you practice good health behaviors.  When do I need to call the doctor?   Blood sugar level is above 240 for more than a day  Blood sugar level drops to less than 40  Abnormal urine test results  Trouble breathing  Very sleepy  Throw up more than once  Many loose stools  Questions about your diet plan  Helpful tips   Try to eat smaller portions of a healthy balanced diet throughout the day. Take time to plan your meals and snacks.  Where can I learn more?   American Diabetes Association  https://www.cdc.gov/diabetes/managing/eat-well/meal-plan-method.html   HelpGuide.org  http://www.helpguide.org/home-pages/healthy-eating.htm   HelpGuide.org  http://www.helpguide.org/articles/diet-weight-loss/diabetes-diet-and-food-tips.htm   Last Reviewed Date   2021-07-21  Consumer Information Use and Disclaimer   This information is not specific medical advice and does not replace information you receive from your health care provider. This is only a brief summary of general information. It does NOT include all information about conditions, illnesses, injuries, tests, procedures, treatments, therapies, discharge instructions or life-style choices that  may apply to you. You must talk with your health care provider for complete information about your health and treatment options. This information should not be used to decide whether or not to accept your health care providers advice, instructions or recommendations. Only your health care provider has the knowledge and training to provide advice that is right for you.   Copyright   Copyright © 2021 [employer] and its affiliates and/or licensors. All rights reserved.

## 2023-10-12 NOTE — ASSESSMENT & PLAN NOTE
Diabetes continues to do well at this time.  Most recent a1c is     Lab Results   Component Value Date    HGBA1C 6.2 (H) 03/30/2023    .      We will continue the current regimen.  Focus on a low carbohydrate diet and consistent exercise.

## 2023-10-12 NOTE — PROGRESS NOTES
Subjective:       Patient ID: Srinath Mcknight is a 82 y.o. male.    Chief Complaint: follow up       HPI:  Patient is an 82-year-old male presenting today for updated physical exam review of chronic health issues.  Patient has history of type 2 diabetes with neuropathy, retinopathy and vascular issues.  He also has hypertension, hyperlipidemia chronic constipation.  He indicates generally speaking he has been doing pretty well he is noted no problems with diabetes or other issues.  He is continued to be quite frustrated with his constipation.  States he is using the MiraLax and drinking plenty of water but he is continues having consistent bowel movements sometimes like going once a week.  He feels bloated and uncomfortable with it.  He is not had a GI evaluation for this.  He had a recent colonoscopy a couple years ago which has only shown polyps.  In regards to his diabetes everything seems to be stable.  His chronic conditions are otherwise under good control.    Review of Systems   Constitutional:  Negative for fever and unexpected weight change.   HENT:  Negative for hearing loss, postnasal drip and rhinorrhea.    Eyes:  Negative for pain and visual disturbance.   Respiratory:  Negative for cough, shortness of breath and wheezing.    Cardiovascular:  Negative for chest pain and palpitations.   Gastrointestinal:  Negative for constipation, diarrhea, nausea and vomiting.   Genitourinary:  Negative for dysuria and hematuria.   Musculoskeletal:  Negative for arthralgias, back pain, myalgias and neck stiffness.   Skin:  Negative for pallor and rash.   Neurological:  Negative for seizures, syncope and headaches.   Hematological:  Negative for adenopathy.   Psychiatric/Behavioral:  Negative for dysphoric mood. The patient is not nervous/anxious.        Objective:   /68   Pulse 73   Temp 96.7 °F (35.9 °C) (Tympanic)   Wt 97.1 kg (214 lb 1.1 oz)   SpO2 98%   BMI 26.76 kg/m²      Physical Exam  Vitals  reviewed.   Constitutional:       General: He is not in acute distress.     Appearance: He is well-developed.   HENT:      Head: Normocephalic and atraumatic.      Right Ear: Tympanic membrane and ear canal normal.      Left Ear: Tympanic membrane and ear canal normal.   Eyes:      Pupils: Pupils are equal, round, and reactive to light.   Neck:      Thyroid: No thyromegaly.      Vascular: No JVD.   Cardiovascular:      Rate and Rhythm: Normal rate and regular rhythm.      Heart sounds: Normal heart sounds. No murmur heard.     No friction rub. No gallop.   Pulmonary:      Effort: Pulmonary effort is normal.      Breath sounds: Normal breath sounds. No wheezing or rales.   Abdominal:      General: Bowel sounds are normal. There is distension.      Palpations: Abdomen is soft. There is no mass.      Tenderness: There is no abdominal tenderness. There is no guarding or rebound.   Musculoskeletal:         General: Normal range of motion.      Cervical back: Normal range of motion and neck supple.   Lymphadenopathy:      Cervical: No cervical adenopathy.   Skin:     General: Skin is warm and dry.      Findings: No rash.   Neurological:      General: No focal deficit present.      Mental Status: He is alert and oriented to person, place, and time.      Cranial Nerves: No cranial nerve deficit.      Deep Tendon Reflexes: Reflexes are normal and symmetric.   Psychiatric:         Mood and Affect: Mood normal.         Judgment: Judgment normal.         No visits with results within 2 Week(s) from this visit.   Latest known visit with results is:   Patient Outreach on 07/14/2023   Component Date Value    Left Eye DM Retinopathy 07/12/2023 Negative     Right Eye DM Retinopathy 07/12/2023 Positive        Assessment:       1. Routine general medical examination at health care facility    2. Type 2 diabetes mellitus with diabetic polyneuropathy, without long-term current use of insulin    3. Hypertension associated with diabetes     4. Combined hyperlipidemia associated with type 2 diabetes mellitus    5. Chronic constipation    6. Diabetes mellitus with stage 3 chronic kidney disease        Plan:   Type 2 diabetes mellitus with diabetic polyneuropathy, without long-term current use of insulin  Diabetes continues to do well at this time.  Most recent a1c is     Lab Results   Component Value Date    HGBA1C 6.2 (H) 03/30/2023    .      We will continue the current regimen.  Focus on a low carbohydrate diet and consistent exercise.        Hypertension associated with diabetes  Blood pressure is under good control.  We will continue the current regimen.  Will work on regular aerobic exercise and a low salt diet.        Combined hyperlipidemia associated with type 2 diabetes mellitus  Cholesterol numbers look good.  We will continue the current regimen at this time.  Remain focused on low fat diet and high dietary fiber intake.      Diabetes mellitus with stage 3 chronic kidney disease  Avoid nsaids (ibuprofen, motrin, aleve,etc.), drink plenty of water.    Routine general medical examination at health care facility    Type 2 diabetes mellitus with diabetic polyneuropathy, without long-term current use of insulin  -     Comprehensive Metabolic Panel; Future; Expected date: 10/12/2023  -     Hemoglobin A1C; Future; Expected date: 10/12/2023  -     Lipid Panel; Future; Expected date: 10/12/2023  -     Microalbumin/Creatinine Ratio, Urine    Hypertension associated with diabetes    Combined hyperlipidemia associated with type 2 diabetes mellitus    Chronic constipation  Comments:  continue miralax, continue fluid intake, refer to GI for further recs  Orders:  -     Ambulatory referral/consult to Gastroenterology; Future; Expected date: 10/19/2023  -     X-Ray Abdomen Flat And Erect; Future; Expected date: 10/12/2023    Diabetes mellitus with stage 3 chronic kidney disease          Follow up in about 6 months (around 4/12/2024) for DM, HTN, HLP, with  Jose Roger.

## 2023-10-23 ENCOUNTER — OFFICE VISIT (OUTPATIENT)
Dept: URGENT CARE | Facility: CLINIC | Age: 83
End: 2023-10-23
Payer: MEDICARE

## 2023-10-23 ENCOUNTER — LAB VISIT (OUTPATIENT)
Dept: LAB | Facility: HOSPITAL | Age: 83
End: 2023-10-23
Attending: STUDENT IN AN ORGANIZED HEALTH CARE EDUCATION/TRAINING PROGRAM
Payer: MEDICARE

## 2023-10-23 VITALS
WEIGHT: 215.63 LBS | TEMPERATURE: 98 F | SYSTOLIC BLOOD PRESSURE: 118 MMHG | BODY MASS INDEX: 28.58 KG/M2 | OXYGEN SATURATION: 98 % | HEART RATE: 81 BPM | HEIGHT: 73 IN | RESPIRATION RATE: 17 BRPM | DIASTOLIC BLOOD PRESSURE: 63 MMHG

## 2023-10-23 DIAGNOSIS — E11.69 COMBINED HYPERLIPIDEMIA ASSOCIATED WITH TYPE 2 DIABETES MELLITUS: ICD-10-CM

## 2023-10-23 DIAGNOSIS — B35.6 TINEA CRURIS: Primary | ICD-10-CM

## 2023-10-23 DIAGNOSIS — E11.59 HYPERTENSION ASSOCIATED WITH DIABETES: Primary | ICD-10-CM

## 2023-10-23 DIAGNOSIS — I15.2 HYPERTENSION ASSOCIATED WITH DIABETES: Primary | ICD-10-CM

## 2023-10-23 DIAGNOSIS — E78.2 COMBINED HYPERLIPIDEMIA ASSOCIATED WITH TYPE 2 DIABETES MELLITUS: ICD-10-CM

## 2023-10-23 DIAGNOSIS — I70.0 AORTIC CALCIFICATION: Chronic | ICD-10-CM

## 2023-10-23 LAB
CHOLEST SERPL-MCNC: 138 MG/DL (ref 120–199)
CHOLEST/HDLC SERPL: 4.2 {RATIO} (ref 2–5)
HDLC SERPL-MCNC: 33 MG/DL (ref 40–75)
HDLC SERPL: 23.9 % (ref 20–50)
LDLC SERPL CALC-MCNC: 78.4 MG/DL (ref 63–159)
NONHDLC SERPL-MCNC: 105 MG/DL
TRIGL SERPL-MCNC: 133 MG/DL (ref 30–150)

## 2023-10-23 PROCEDURE — 99213 OFFICE O/P EST LOW 20 MIN: CPT | Mod: S$GLB,,, | Performed by: PHYSICIAN ASSISTANT

## 2023-10-23 PROCEDURE — 36415 COLL VENOUS BLD VENIPUNCTURE: CPT | Mod: PO | Performed by: STUDENT IN AN ORGANIZED HEALTH CARE EDUCATION/TRAINING PROGRAM

## 2023-10-23 PROCEDURE — 80061 LIPID PANEL: CPT | Performed by: STUDENT IN AN ORGANIZED HEALTH CARE EDUCATION/TRAINING PROGRAM

## 2023-10-23 PROCEDURE — 99213 PR OFFICE/OUTPT VISIT, EST, LEVL III, 20-29 MIN: ICD-10-PCS | Mod: S$GLB,,, | Performed by: PHYSICIAN ASSISTANT

## 2023-10-23 RX ORDER — KETOCONAZOLE 20 MG/G
CREAM TOPICAL DAILY
Qty: 30 G | Refills: 0 | Status: SHIPPED | OUTPATIENT
Start: 2023-10-23 | End: 2023-11-06

## 2023-10-23 RX ORDER — SODIUM, POTASSIUM,MAG SULFATES 17.5-3.13G
SOLUTION, RECONSTITUTED, ORAL ORAL
COMMUNITY
Start: 2023-10-12 | End: 2023-12-27

## 2023-10-23 NOTE — PROGRESS NOTES
"Subjective:      Patient ID: Srinath Mcknight is a 82 y.o. male.    Vitals:  height is 6' 1.35" (1.863 m) and weight is 97.8 kg (215 lb 9.8 oz). His oral temperature is 97.5 °F (36.4 °C). His blood pressure is 118/63 and his pulse is 81. His respiration is 17 and oxygen saturation is 98%.     Chief Complaint: Rash ([id number])    Pt presented here today with rash on upper thigh bilaterally for about 1month. Started out as one red bump and has since spread and itches with some soreness. He has tried an old prescription cream with no relief.     Rash  This is a new problem. The current episode started 1 to 4 weeks ago. The affected locations include the groin, left upper leg and right upper leg. The rash is characterized by itchiness, burning and redness. Pertinent negatives include no anorexia, congestion, cough, diarrhea, eye pain, facial edema, fatigue, fever, joint pain, nail changes, rhinorrhea, shortness of breath, sore throat or vomiting. There is no history of allergies, asthma, eczema or varicella.       Constitution: Negative for fatigue and fever.   HENT:  Negative for congestion and sore throat.    Eyes:  Negative for eye pain.   Respiratory:  Negative for cough and shortness of breath.    Gastrointestinal:  Negative for vomiting and diarrhea.   Skin:  Positive for rash.      Objective:     Physical Exam   Abdominal: Normal appearance.   Neurological: He is alert.   Skin: Skin is rash and vesicular. Capillary refill takes less than 2 seconds.        Nursing note and vitals reviewed.      Assessment:     1. Tinea cruris      He presents with rash in groin area for the last few weeks. Rash is red and pruritic. He has tried antibiotic cream with no relief. He denies new lotion, soap or detergent. He has tinea cruris and I will start him on ketoconazole twice a day. He was instructed to hydrate and return to the clinic if new or worsening symptoms occur.    Plan:       Tinea cruris  -     ketoconazole (NIZORAL) 2 " % cream; Apply topically once daily. for 14 days  Dispense: 30 g; Refill: 0

## 2023-10-24 ENCOUNTER — OFFICE VISIT (OUTPATIENT)
Dept: CARDIOLOGY | Facility: CLINIC | Age: 83
End: 2023-10-24
Payer: MEDICARE

## 2023-10-24 ENCOUNTER — HOSPITAL ENCOUNTER (OUTPATIENT)
Dept: CARDIOLOGY | Facility: HOSPITAL | Age: 83
Discharge: HOME | End: 2023-10-24
Attending: STUDENT IN AN ORGANIZED HEALTH CARE EDUCATION/TRAINING PROGRAM
Payer: MEDICARE

## 2023-10-24 VITALS
WEIGHT: 212.31 LBS | DIASTOLIC BLOOD PRESSURE: 70 MMHG | HEART RATE: 63 BPM | BODY MASS INDEX: 28.14 KG/M2 | HEIGHT: 73 IN | SYSTOLIC BLOOD PRESSURE: 114 MMHG

## 2023-10-24 DIAGNOSIS — N18.30 STAGE 3 CHRONIC KIDNEY DISEASE, UNSPECIFIED WHETHER STAGE 3A OR 3B CKD: ICD-10-CM

## 2023-10-24 DIAGNOSIS — I15.2 HYPERTENSION ASSOCIATED WITH DIABETES: Primary | ICD-10-CM

## 2023-10-24 DIAGNOSIS — E11.59 HYPERTENSION ASSOCIATED WITH DIABETES: ICD-10-CM

## 2023-10-24 DIAGNOSIS — E78.2 COMBINED HYPERLIPIDEMIA ASSOCIATED WITH TYPE 2 DIABETES MELLITUS: ICD-10-CM

## 2023-10-24 DIAGNOSIS — I70.0 ATHEROSCLEROSIS OF AORTA: ICD-10-CM

## 2023-10-24 DIAGNOSIS — E11.59 HYPERTENSION ASSOCIATED WITH DIABETES: Primary | ICD-10-CM

## 2023-10-24 DIAGNOSIS — I15.2 HYPERTENSION ASSOCIATED WITH DIABETES: ICD-10-CM

## 2023-10-24 DIAGNOSIS — R06.09 DYSPNEA ON EXERTION: ICD-10-CM

## 2023-10-24 DIAGNOSIS — E11.69 COMBINED HYPERLIPIDEMIA ASSOCIATED WITH TYPE 2 DIABETES MELLITUS: ICD-10-CM

## 2023-10-24 DIAGNOSIS — E11.42 TYPE 2 DIABETES MELLITUS WITH DIABETIC POLYNEUROPATHY, WITHOUT LONG-TERM CURRENT USE OF INSULIN: ICD-10-CM

## 2023-10-24 DIAGNOSIS — I70.0 AORTIC CALCIFICATION: ICD-10-CM

## 2023-10-24 DIAGNOSIS — M47.26 OSTEOARTHRITIS OF SPINE WITH RADICULOPATHY, LUMBAR REGION: ICD-10-CM

## 2023-10-24 DIAGNOSIS — I70.0 AORTIC CALCIFICATION: Chronic | ICD-10-CM

## 2023-10-24 PROCEDURE — 1101F PR PT FALLS ASSESS DOC 0-1 FALLS W/OUT INJ PAST YR: ICD-10-PCS | Mod: CPTII,S$GLB,, | Performed by: STUDENT IN AN ORGANIZED HEALTH CARE EDUCATION/TRAINING PROGRAM

## 2023-10-24 PROCEDURE — 3288F PR FALLS RISK ASSESSMENT DOCUMENTED: ICD-10-PCS | Mod: CPTII,S$GLB,, | Performed by: STUDENT IN AN ORGANIZED HEALTH CARE EDUCATION/TRAINING PROGRAM

## 2023-10-24 PROCEDURE — 99999 PR PBB SHADOW E&M-EST. PATIENT-LVL IV: ICD-10-PCS | Mod: PBBFAC,,, | Performed by: STUDENT IN AN ORGANIZED HEALTH CARE EDUCATION/TRAINING PROGRAM

## 2023-10-24 PROCEDURE — 99214 OFFICE O/P EST MOD 30 MIN: CPT | Mod: S$GLB,,, | Performed by: STUDENT IN AN ORGANIZED HEALTH CARE EDUCATION/TRAINING PROGRAM

## 2023-10-24 PROCEDURE — 3078F PR MOST RECENT DIASTOLIC BLOOD PRESSURE < 80 MM HG: ICD-10-PCS | Mod: CPTII,S$GLB,, | Performed by: STUDENT IN AN ORGANIZED HEALTH CARE EDUCATION/TRAINING PROGRAM

## 2023-10-24 PROCEDURE — 93005 ELECTROCARDIOGRAM TRACING: CPT | Mod: PO

## 2023-10-24 PROCEDURE — 99214 PR OFFICE/OUTPT VISIT, EST, LEVL IV, 30-39 MIN: ICD-10-PCS | Mod: S$GLB,,, | Performed by: STUDENT IN AN ORGANIZED HEALTH CARE EDUCATION/TRAINING PROGRAM

## 2023-10-24 PROCEDURE — 93010 EKG 12-LEAD: ICD-10-PCS | Mod: ,,, | Performed by: INTERNAL MEDICINE

## 2023-10-24 PROCEDURE — 3288F FALL RISK ASSESSMENT DOCD: CPT | Mod: CPTII,S$GLB,, | Performed by: STUDENT IN AN ORGANIZED HEALTH CARE EDUCATION/TRAINING PROGRAM

## 2023-10-24 PROCEDURE — 1126F PR PAIN SEVERITY QUANTIFIED, NO PAIN PRESENT: ICD-10-PCS | Mod: CPTII,S$GLB,, | Performed by: STUDENT IN AN ORGANIZED HEALTH CARE EDUCATION/TRAINING PROGRAM

## 2023-10-24 PROCEDURE — 1126F AMNT PAIN NOTED NONE PRSNT: CPT | Mod: CPTII,S$GLB,, | Performed by: STUDENT IN AN ORGANIZED HEALTH CARE EDUCATION/TRAINING PROGRAM

## 2023-10-24 PROCEDURE — 3078F DIAST BP <80 MM HG: CPT | Mod: CPTII,S$GLB,, | Performed by: STUDENT IN AN ORGANIZED HEALTH CARE EDUCATION/TRAINING PROGRAM

## 2023-10-24 PROCEDURE — 1159F MED LIST DOCD IN RCRD: CPT | Mod: CPTII,S$GLB,, | Performed by: STUDENT IN AN ORGANIZED HEALTH CARE EDUCATION/TRAINING PROGRAM

## 2023-10-24 PROCEDURE — 3074F PR MOST RECENT SYSTOLIC BLOOD PRESSURE < 130 MM HG: ICD-10-PCS | Mod: CPTII,S$GLB,, | Performed by: STUDENT IN AN ORGANIZED HEALTH CARE EDUCATION/TRAINING PROGRAM

## 2023-10-24 PROCEDURE — 1101F PT FALLS ASSESS-DOCD LE1/YR: CPT | Mod: CPTII,S$GLB,, | Performed by: STUDENT IN AN ORGANIZED HEALTH CARE EDUCATION/TRAINING PROGRAM

## 2023-10-24 PROCEDURE — 99999 PR PBB SHADOW E&M-EST. PATIENT-LVL IV: CPT | Mod: PBBFAC,,, | Performed by: STUDENT IN AN ORGANIZED HEALTH CARE EDUCATION/TRAINING PROGRAM

## 2023-10-24 PROCEDURE — 93010 ELECTROCARDIOGRAM REPORT: CPT | Mod: ,,, | Performed by: INTERNAL MEDICINE

## 2023-10-24 PROCEDURE — 3074F SYST BP LT 130 MM HG: CPT | Mod: CPTII,S$GLB,, | Performed by: STUDENT IN AN ORGANIZED HEALTH CARE EDUCATION/TRAINING PROGRAM

## 2023-10-24 PROCEDURE — 1159F PR MEDICATION LIST DOCUMENTED IN MEDICAL RECORD: ICD-10-PCS | Mod: CPTII,S$GLB,, | Performed by: STUDENT IN AN ORGANIZED HEALTH CARE EDUCATION/TRAINING PROGRAM

## 2023-10-24 NOTE — PROGRESS NOTES
Section of Cardiology                  Cardiac Clinic Note    Chief Complaint/Reason for consultation:  Decreased exercise tolerance      HPI:   Srinath Mcknight is a 82 y.o. male with h/o hypertension, diabetes, DJD, HLD, CAD, CKD who is here for follow-up.    11/18/20  Seen by Dr. Cobos.  Patient complained of 2-4 weeks of increased fatigue and dyspnea on exertion with moderate exertion.  Denies dyspnea at rest, orthopnea, PND, chest pain, palpitations, lower extremity edema.  For possible diastolic dysfunction, his losartan was increased to 50 mg b.i.d. and started on empagliflozin (jardiance) 10 mg daily.    9/15/21  He presents today c/o DAMON. Started about 6-7 months ago. Has been fairly stable. Says he can walk to the mailbox without SOB, but becomes SOB when he walks back. Says he can't exercise due to pain and SOB. Reports intermittent cough at times with some sputum production. Mild LE swelling. Sleeps on 1 pillow and denies orthopnea, PND. Reports occasional dizziness when he stands from a seated position.     He ambulates with a cane due to multiple surgeries on his back and hip. He is able to perform all of his ADLs. Denies syncope, palpitations, chest pain, fever, vomiting, blurry vision. Denies tobacco use, ETOH use, illicit drug use.       10/12/21  He reports feeling well. He is still getting short of breath with exertion. Does not get SOB at rest.  Denies chest pain, syncope, palpitations, PND, orthopnea. He has never smoked but has been around others who have smoked. He has been trying to exercise but gets short-winded within 5 minutes and would have to stop.         4/14/22  Still getting SOB. Has to stop to catch his breath. Able to walk a little further than before  Denies syncope, but feels that he has balancing issues. Denies tinnitus. Occurs when getting up from seated position    Does not want to wear the CPAP at night  Sees pulmonary, did not qualify for pulmonary rehab      Denies chest pain, syncope, orthopnea, LE swelling, palpitations, chest pain, fever, vomiting.         10/14/22  DAMON is improving  Reports low BP, decreased losartan to daily  Getting injections in knee and back   Doing well   Ambulates with cane, right leg weakness     Denies chest pain, syncope, orthopnea, LE swelling, palpitations      4/18/23  SOB mostly after eating, especially heavy meals  Knee injections are improving his pain  BP stable  Still has issues with balancing, uses cane regularly  Denies falls      Denies chest pain, syncope, orthopnea, LE swelling, palpitations        5/23/23  Comes in with wife  Dizziness with standing- ongoing for some time, thinks getting worse  Orthostatics in clinic normal  Uses a cane for this reason   No ear ringing or pain  BP elevated here and home  Losartan was 1/2'd recently- can't tell if symptoms improved  Has not fallen       7/18/23  No falling  Still having some dizziness, improved after holding flomax  Exercising at the gym, helps his SOB  Gym about twice a week, sometimes 3 times  No chest pain  Weight going up about 4 lbs      10/24/23  Not exercising due to possible COVID  Feeling well  Appetite is good   BP stable  Lost 10 lbs since visit   No falls  SOB stable with exertion    Denies chest pain, syncope           EKG 10/24/23 NSR, TWI inferolateral  EKG 4/17/23 NSR, TWI inferolateral leads  EKG 4/14/22 NSR, T-wave abnormality, consider inferolateral ischemia, 74 bpm,   EKG 8/26/21  NSR, T-wave abnormality, consider inferolateral ischemia  EKG 9/15/21 NSR, T-wave abnormality, inferolateral ischemia, 78 bpm,  ms, QTc 401 ms     ECHO 10/12/21  The left ventricle is normal in size with concentric remodeling and normal systolic function.  Normal left ventricular diastolic function.  Normal right ventricular size with normal right ventricular systolic function.  The estimated ejection fraction is 55%.  Mild aortic regurgitation.       ECHO   2014  CONCLUSIONS     1 - Normal left ventricular systolic function (EF 55-60%).     2 - Normal left ventricular diastolic function.     3 - Normal right ventricular systolic function .     4 - Mild aortic regurgitation.     5 - Concentric remodeling.         STRESS TEST 10/12/21  Normal myocardial perfusion scan. There is no evidence of myocardial ischemia or infarction.    The gated perfusion images showed an ejection fraction of 47% at rest. The gated perfusion images showed an ejection fraction of 63% post stress.    The EKG portion of this study is uninterpretable due to significant baseline abnormalities    The patient reported no chest pain during the stress test.      STRESS TEST 2017  EKG Conclusions:   1. The EKG portion of this study is negative for ischemia at a moderate workload, and peak heart rate of 80 bpm (55% of predicted).   2. Blood pressure remained stable throughout the protocol  (Presenting BP: 119/70 Peak BP: 114/49).   3. No significant arrhythmias were present.   4. There were no symptoms of chest discomfort or significant dyspnea throughout the protocol.     Impression: NORMAL MYOCARDIAL PERFUSION   1. The perfusion scan is free of evidence for myocardial ischemia or injury.   2. Resting wall motion is physiologic.   3. Resting LV function is normal.   4. The ventricular volumes are normal at rest and stress.   5. The extracardiac distribution of radioactivity is normal.         KAMALJIT 12/4/20 (Normal ABIs)  The right ankle [DT] artery has triphasic flow.   The right ankle [DP] artery has triphasic flow.   The left ankle [PT] artery has triphasic flow.  The left ankle [DP] artery has triphasic flow.    [facility]      ROS: All 10 systems reviewed. Please refer to the HPI for pertinent positives. All other systems negative.     Past Medical History  Past Medical History:   Diagnosis Date    Anemia due to unknown mechanism 11/10/2015    Angina pectoris 2014    not since    Arthritis     Back pain      Coronary artery disease     Diabetes mellitus with stage 3 chronic kidney disease 11/18/2020    DM (diabetes mellitus) 25-30 years     am 05/15/2019    DM (diabetes mellitus)     BS 97 am 06/04/2021    Encounter for blood transfusion     Gout 12/05/2017    right foot     History of blood transfusion     Hyperlipemia     Hypertension     CHARLES (obstructive sleep apnea)     Polyneuropathy     Spinal cord disease     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    Trouble in sleeping     Type 2 diabetes mellitus with diabetic polyneuropathy, without long-term current use of insulin     Urinary incontinence     Uses hearing aid     bilat       Surgical History  Past Surgical History:   Procedure Laterality Date    BACK SURGERY  2019    COLONOSCOPY N/A 6/30/2020    Procedure: COLONOSCOPY;  Surgeon: Joseph Iraheta MD;  Location: [facility];  Service: Endoscopy;  Laterality: N/A;    ESOPHAGOGASTRODUODENOSCOPY N/A 6/30/2020    Procedure: EGD (ESOPHAGOGASTRODUODENOSCOPY);  Surgeon: Joseph Iraheta MD;  Location: [facility] ENDO;  Service: Endoscopy;  Laterality: N/A;    HERNIA REPAIR Bilateral 04/18/2017    INJECTION OF JOINT Right 4/14/2023    Procedure: right Synvisc Knee injection;  Surgeon: Hossein Brooks MD;  Location: [facility] PAIN MGT;  Service: Pain Management;  Laterality: Right;    JOINT REPLACEMENT Left 10/09/2017    L YAHIR Dr. Alcocer    LAMINECTOMY  07/22/2016    left elbow  10/2017    procedure to remove gout    lumbar foraminotomy  03/2018    REVISION TOTAL HIP ARTHROPLASTY Left 9/11/2018    Procedure: REVISION, TOTAL ARTHROPLASTY, HIP;  Surgeon: Albaro Alcocer Sr., MD;  Location: [facility] OR;  Service: Orthopedics;  Laterality: Left;    ROTATOR CUFF REPAIR Left 2006    SHOULDER ARTHROSCOPY W/ ROTATOR CUFF REPAIR Right 2009          Allergies:   Review of patient's allergies indicates:   Allergen Reactions    Sulfa (sulfonamide antibiotics)      Can't recall from 1995       Social  History:  Social History     Socioeconomic History    Marital status:     Number of children: 0    Highest education level: Master's degree (e.g., MA, MS, Olegario, MEd, MSW, CARMEL)   Occupational History    Occupation: retired     Comment: teacher   Tobacco Use    Smoking status: Never    Smokeless tobacco: Never   Substance and Sexual Activity    Alcohol use: Not Currently    Drug use: No    Sexual activity: Yes     Partners: Female     Birth control/protection: Condom   Social History Narrative    . No children. Retired but some part time work - 4 hours a day. 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 Caffeine intake - sugar free cola, Rare coffee intake. Still drives. Does have a Living Will . Has a nephew Jt Gayle, lives in [place]. Has a friend Karina Rossi, locally who could help him.      Social Determinants of Health     Financial Resource Strain: Medium Risk (8/15/2023)    Overall Financial Resource Strain (CARDIA)     Difficulty of Paying Living Expenses: Somewhat hard   Food Insecurity: No Food Insecurity (8/15/2023)    Hunger Vital Sign     Worried About Running Out of Food in the Last Year: Never true     Ran Out of Food in the Last Year: Never true   Transportation Needs: No Transportation Needs (8/15/2023)    PRAPARE - Transportation     Lack of Transportation (Medical): No     Lack of Transportation (Non-Medical): No   Physical Activity: Insufficiently Active (8/15/2023)    Exercise Vital Sign     Days of Exercise per Week: 2 days     Minutes of Exercise per Session: 30 min   Stress: No Stress Concern Present (8/15/2023)    [employer] [place] [employer] - Occupational Stress Questionnaire     Feeling of Stress : Not at all   Social Connections: Moderately Isolated (8/15/2023)    Social Connection and Isolation Panel [NHANES]     Frequency of Communication with Friends and Family: More than three times a week     Frequency of Social Gatherings with Friends and Family: Once a week      Attends Gnosticist Services: Never     Active Member of Clubs or Organizations: No     Attends Club or Organization Meetings: Never     Marital Status:    Housing Stability: Low Risk  (8/15/2023)    Housing Stability Vital Sign     Unable to Pay for Housing in the Last Year: No     Number of Places Lived in the Last Year: 1     Unstable Housing in the Last Year: No       Family History:  family history includes Cancer (age of onset: 50) in his brother; Diabetes in his father, mother, and sister; Drug abuse in his brother; Heart disease in his father and mother; Hypertension in his father and mother; Stroke in his father.    Home Medications:  Current Outpatient Medications on File Prior to Visit   Medication Sig Dispense Refill    allopurinoL (ZYLOPRIM) 100 MG tablet TAKE 1 TABLET EVERY DAY 90 tablet 3    bisacodyl (DULCOLAX) 10 mg Supp   0    blood glucose control, low Soln by Misc.(Non-Drug; Combo Route) route.      blood sugar diagnostic (TRUE METRIX GLUCOSE TEST STRIP) Strp Use as directed to check sugars 100 strip 11    blood-glucose meter (TRUE METRIX AIR GLUCOSE METER) kit USE AS DIRECTED 1 each 0    ciprofloxacin HCl (CILOXAN) 0.3 % ophthalmic solution INSTILL 1 DROP IN RIGHT EYE TWICE DAILY FOR 1 WEEK THEN STOP      clonazePAM (KLONOPIN) 0.5 MG tablet Take 1 tablet (0.5 mg total) by mouth every evening. 30 tablet 3    cyanocobalamin, vitamin B-12, 1,000 mcg Subl Place 1,000 mcg under the tongue once daily. 90 tablet 3    diclofenac sodium (VOLTAREN) 1 % Gel Apply 2 g topically 4 (four) times daily as needed (pain). 200 g 2    GARLIC EXTRACT ORAL Take 1 tablet by mouth once daily. Per [facility]      JARDIANCE 10 mg tablet       ketoconazole (NIZORAL) 2 % cream Apply topically once daily. for 14 days 30 g 0    ketorolac 0.5% (ACULAR) 0.5 % Drop Place 1 drop into the right eye 4 (four) times daily.      lancets Misc Use as directed to check sugars 100 each 11    LEXISCAN 0.4 mg/5 mL Syrg        "losartan (COZAAR) 50 MG tablet Take 1 tablet (50 mg total) by mouth once daily. 90 tablet 0    melatonin 10 mg Tab Take 1 tablet (10 mg total) by mouth every evening. 30 tablet 11    metFORMIN (GLUCOPHAGE) 1000 MG tablet TAKE 1 TABLET TWICE DAILY WITH MEALS 180 tablet 0    metoprolol succinate (TOPROL-XL) 50 MG 24 hr tablet TAKE 1 TABLET EVERY DAY 90 tablet 3    mometasone 0.1% (ELOCON) 0.1 % cream Apply topically once daily. 50 g 2    moxifloxacin (VIGAMOX) 0.5 % ophthalmic solution       multivitamin (THERAGRAN) per tablet Take 1 tablet by mouth once daily.      papaverine 30 mg/mL injection Inject 0.3 ml of trimix solution prn ED max once daily  Prepare 10ml of trimix solution containing:    Papaverine 30mg/ml    Phentolamine 1 mg/ml    Alprostadil 10mcg/ml  Dispense 10ml per refill  Qs syringes 1cc/30g/0.5" and alcohol swabs dispense as needed for Intracavernosal injection 10 mL 5    PFIZER COVID BIVAL,12Y UP,,PF, 30 mcg/0.3 mL injection       pramipexole (MIRAPEX) 0.125 MG tablet TAKE 1 TABLET( 0.125 MG) BY MOUTH EVERY EVENING 30 tablet 11    pravastatin (PRAVACHOL) 40 MG tablet TAKE 1 TABLET EVERY DAY 90 tablet 2    prednisoLONE acetate (PRED FORTE) 1 % DrpS       pregabalin (LYRICA) 75 MG capsule Take 1-2 capsules ( mg total) by mouth every evening. 180 capsule 1    REFRESH OPTIVE 0.5-0.9 % Drop SMARTSI Drop(s) In Eye(s) PRN      cetirizine (ZYRTEC) 10 MG tablet Take 1 tablet (10 mg total) by mouth every evening. for 14 days 14 tablet 0    olopatadine (PATANOL) 0.1 % ophthalmic solution Place 1 drop into the right eye 2 (two) times daily. 5 mL 0    sodium,potassium,mag sulfates (SUPREP BOWEL PREP KIT) 17.5-3.13-1.6 gram SolR TAKE 354 ML BY MOUTH 1 TIME FOR 1 DOSE       No current facility-administered medications on file prior to visit.       Physical exam:  /70 (BP Location: Right arm, Patient Position: Sitting, BP Method: Medium (Manual))   Pulse 63   Ht 6' 1.35" (1.863 m)   Wt 96.3 kg " (212 lb 4.9 oz)   BMI 27.74 kg/m²       General: Pt is a 82 y.o. year old male who is AAOx3, in NAD, is pleasant, well nourished, looks stated age, walks with a cane  HEENT: PERRL, EOMI, Oral mucosa pink & moist  CVS  No abnormal cardiac pulsations noted on inspection. JVP not raised. The apical impulse is normal on palpation, and is located in the left 5th intercostal space in the mid - clavicular line. No palpable thrills or abnormal pulsations noted. RR, S1 - S2 heard, 1/6 systolic murmur at apex, rubs or gallops appreciated.   PUL : CTA B/L. No wheezes/crakles heard   ABD : BS +, soft. No tenderness elicited   LE : No C/C/E. Distal Pulses palpable B/L         LABS:    Chemistry:   Lab Results   Component Value Date     10/12/2023    K 4.0 10/12/2023     10/12/2023    CO2 27 10/12/2023    BUN 17 10/12/2023    CREATININE 1.4 10/12/2023    CALCIUM 9.2 10/12/2023     Cardiac Markers:   Lab Results   Component Value Date    TROPONINI <0.006 03/10/2015     Cardiac Markers (Last 3):   Lab Results   Component Value Date    TROPONINI <0.006 03/10/2015     CBC:   Lab Results   Component Value Date    WBC 3.99 06/14/2023    HGB 11.7 (L) 06/14/2023    HCT 35.9 (L) 06/14/2023    MCV 88 06/14/2023     06/14/2023     Lipids:   Lab Results   Component Value Date    CHOL 138 10/23/2023    TRIG 133 10/23/2023    HDL 33 (L) 10/23/2023     Coagulation:   Lab Results   Component Value Date    INR 1.0 07/31/2019    APTT 26.2 07/31/2019           Assessment    1. Hypertension associated with diabetes    2. Aortic calcification    3. Combined hyperlipidemia associated with type 2 diabetes mellitus    4. Type 2 diabetes mellitus with diabetic polyneuropathy, without long-term current use of insulin    5. Osteoarthritis of spine with radiculopathy, lumbar region    6. Atherosclerosis of aorta    7. Dyspnea on exertion              Plan:    Dyspnea on exertion- stable   Due to deconditioning  Echo 10/21 with normal EF,  mild AI (unchanged from prior)  Pharmacologic nuclear stress test 10/21 without evidence of ischemia  F/u with pulmonology     Dizziness- resolved   Holding tamsulosin    CAD  H/o calcified aorta  Denies angina  Continue aspirin    CKD  stable    HLD  LDL 78 as of 10/23  Continue pravastatin 40 mg daily    HTN  Stable  Continue losartan 50 mg daily, continue metoprolol 50 mg daily    Diabetes  Stable, A1c 6.4  Continue therapy     Arthritis  Chronic back and knee pain  Patient does not take pain medications  Ambulates with a cane          I have reviewed all pertinent chart information.  Plans and recommendations have been formulated under my direct supervision. All questions answered and patient voiced understanding.   If symptoms persist go to the ED.    RTC in 6 months      Yasmine Calvillo MD  Cardiology

## 2023-10-26 ENCOUNTER — TELEPHONE (OUTPATIENT)
Dept: URGENT CARE | Facility: CLINIC | Age: 83
End: 2023-10-26
Payer: MEDICARE

## 2023-11-06 ENCOUNTER — HOSPITAL ENCOUNTER (OUTPATIENT)
Dept: RADIOLOGY | Facility: HOSPITAL | Age: 83
Discharge: HOME | End: 2023-11-06
Attending: NURSE PRACTITIONER
Payer: MEDICARE

## 2023-11-06 DIAGNOSIS — N28.1 RENAL CYST: ICD-10-CM

## 2023-11-06 PROCEDURE — 76770 US EXAM ABDO BACK WALL COMP: CPT | Mod: 26,HCNC,, | Performed by: RADIOLOGY

## 2023-11-06 PROCEDURE — 76770 US EXAM ABDO BACK WALL COMP: CPT | Mod: TC,HCNC

## 2023-11-06 PROCEDURE — 76770 US RETROPERITONEAL COMPLETE: ICD-10-PCS | Mod: 26,HCNC,, | Performed by: RADIOLOGY

## 2023-11-13 ENCOUNTER — OFFICE VISIT (OUTPATIENT)
Dept: UROLOGY | Facility: CLINIC | Age: 83
End: 2023-11-13
Payer: MEDICARE

## 2023-11-13 VITALS
BODY MASS INDEX: 28.22 KG/M2 | DIASTOLIC BLOOD PRESSURE: 74 MMHG | WEIGHT: 212.94 LBS | HEIGHT: 73 IN | TEMPERATURE: 99 F | HEART RATE: 82 BPM | RESPIRATION RATE: 16 BRPM | SYSTOLIC BLOOD PRESSURE: 128 MMHG

## 2023-11-13 DIAGNOSIS — N52.01 ERECTILE DYSFUNCTION DUE TO ARTERIAL INSUFFICIENCY: Primary | ICD-10-CM

## 2023-11-13 DIAGNOSIS — N28.1 RENAL CYST: ICD-10-CM

## 2023-11-13 DIAGNOSIS — N52.9 ERECTILE DYSFUNCTION, UNSPECIFIED ERECTILE DYSFUNCTION TYPE: ICD-10-CM

## 2023-11-13 LAB — POC RESIDUAL URINE VOLUME: 18 ML (ref 0–100)

## 2023-11-13 PROCEDURE — 1125F AMNT PAIN NOTED PAIN PRSNT: CPT | Mod: HCNC,CPTII,S$GLB, | Performed by: NURSE PRACTITIONER

## 2023-11-13 PROCEDURE — 1159F PR MEDICATION LIST DOCUMENTED IN MEDICAL RECORD: ICD-10-PCS | Mod: HCNC,CPTII,S$GLB, | Performed by: NURSE PRACTITIONER

## 2023-11-13 PROCEDURE — 99215 PR OFFICE/OUTPT VISIT, EST, LEVL V, 40-54 MIN: ICD-10-PCS | Mod: HCNC,S$GLB,, | Performed by: NURSE PRACTITIONER

## 2023-11-13 PROCEDURE — 1159F MED LIST DOCD IN RCRD: CPT | Mod: HCNC,CPTII,S$GLB, | Performed by: NURSE PRACTITIONER

## 2023-11-13 PROCEDURE — 3074F PR MOST RECENT SYSTOLIC BLOOD PRESSURE < 130 MM HG: ICD-10-PCS | Mod: HCNC,CPTII,S$GLB, | Performed by: NURSE PRACTITIONER

## 2023-11-13 PROCEDURE — 3078F DIAST BP <80 MM HG: CPT | Mod: HCNC,CPTII,S$GLB, | Performed by: NURSE PRACTITIONER

## 2023-11-13 PROCEDURE — 1125F PR PAIN SEVERITY QUANTIFIED, PAIN PRESENT: ICD-10-PCS | Mod: HCNC,CPTII,S$GLB, | Performed by: NURSE PRACTITIONER

## 2023-11-13 PROCEDURE — 3288F PR FALLS RISK ASSESSMENT DOCUMENTED: ICD-10-PCS | Mod: HCNC,CPTII,S$GLB, | Performed by: NURSE PRACTITIONER

## 2023-11-13 PROCEDURE — 1101F PR PT FALLS ASSESS DOC 0-1 FALLS W/OUT INJ PAST YR: ICD-10-PCS | Mod: HCNC,CPTII,S$GLB, | Performed by: NURSE PRACTITIONER

## 2023-11-13 PROCEDURE — 51798 POCT BLADDER SCAN: ICD-10-PCS | Mod: HCNC,S$GLB,, | Performed by: NURSE PRACTITIONER

## 2023-11-13 PROCEDURE — 3288F FALL RISK ASSESSMENT DOCD: CPT | Mod: HCNC,CPTII,S$GLB, | Performed by: NURSE PRACTITIONER

## 2023-11-13 PROCEDURE — 51798 US URINE CAPACITY MEASURE: CPT | Mod: HCNC,S$GLB,, | Performed by: NURSE PRACTITIONER

## 2023-11-13 PROCEDURE — 3074F SYST BP LT 130 MM HG: CPT | Mod: HCNC,CPTII,S$GLB, | Performed by: NURSE PRACTITIONER

## 2023-11-13 PROCEDURE — 99999 PR PBB SHADOW E&M-EST. PATIENT-LVL V: CPT | Mod: PBBFAC,HCNC,, | Performed by: NURSE PRACTITIONER

## 2023-11-13 PROCEDURE — 99215 OFFICE O/P EST HI 40 MIN: CPT | Mod: HCNC,S$GLB,, | Performed by: NURSE PRACTITIONER

## 2023-11-13 PROCEDURE — 99999 PR PBB SHADOW E&M-EST. PATIENT-LVL V: ICD-10-PCS | Mod: PBBFAC,HCNC,, | Performed by: NURSE PRACTITIONER

## 2023-11-13 PROCEDURE — 1101F PT FALLS ASSESS-DOCD LE1/YR: CPT | Mod: HCNC,CPTII,S$GLB, | Performed by: NURSE PRACTITIONER

## 2023-11-13 PROCEDURE — 3078F PR MOST RECENT DIASTOLIC BLOOD PRESSURE < 80 MM HG: ICD-10-PCS | Mod: HCNC,CPTII,S$GLB, | Performed by: NURSE PRACTITIONER

## 2023-11-13 RX ORDER — SILDENAFIL 100 MG/1
100 TABLET, FILM COATED ORAL DAILY PRN
Qty: 15 TABLET | Refills: 5 | Status: SHIPPED | OUTPATIENT
Start: 2023-11-13

## 2023-11-13 NOTE — PROGRESS NOTES
Chief Complaint:   Prostate cancer screening   Erectile dysfunction  BPH  Renal cysts      HPI:   Patient 82-year-old male that is presenting as a follow-up for his annual exam.  Patient states that he was taken off tamsulosin secondary to feeling lightheaded.  Reports that urinary stream is strong and nocturia is once nightly.  Urine in clinic is negative and PVR was 21 mL.  No gross hematuria.  No previous elevated PSAs or prostate biopsies.  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  Recent renal ultrasound indicated a stable, simple cyst.  11/09/2022  Patient is a 81-year-old male that is presenting for his annual exam.  Patient is currently on tamsulosin, reports all BPH symptoms are resolved.  Urine in clinic is negative.  PVR is 14 mL.  Recent PSA was normal, 0.77.  No past elevated PSAs or prostate biopsies.  Patient has erectile dysfunction and reports that TriMix is working well for him.  Patient has been followed for a right renal cyst.  Had renal ultrasound today, results are pending.  Denies gross hematuria or flank pain.  11/09/2021  Patient is a 80-year-old male that is presenting to review renal ultrasound.  Patient has a history of benign, simple renal cyst.  Renal ultrasound was stable, no change to right kidney upper pole cyst measuring 1.4 cm.  No hydronephrosis or nephrolithiasis.  Patient is due for PSA level.   Wants to discuss possible treatments for ED. No BPH meds. Nocturia once nightly, no daytime LUTS.  Allergies:  Sulfa (sulfonamide antibiotics)    Medications:  has a current medication list which includes the following prescription(s): allopurinol, bisacodyl, blood glucose control, low, blood sugar diagnostic, true metrix air glucose meter, ciprofloxacin hcl, clonazepam, cyanocobalamin (vitamin b-12), diclofenac sodium, garlic extract, jardiance, ketorolac 0.5%, lancets, lexiscan,  losartan, melatonin, metformin, metoprolol succinate, mometasone 0.1%, moxifloxacin, multivitamin, papaverine, pfizer covid bival(12y up)(pf), pramipexole, pravastatin, prednisolone acetate, pregabalin, refresh optive, sodium,potassium,mag sulfates, cetirizine, ketoconazole, and olopatadine.    Review of Systems:  General: No fever, chills, fatigability, or weight loss.  Skin: No rashes, itching, or changes in color or texture of skin.  Chest: Denies DAMON, cyanosis, wheezing, cough, and sputum production.  Abdomen: Appetite fine. No weight loss. Denies diarrhea, abdominal pain, hematemesis, or blood in stool.  Musculoskeletal: No joint stiffness or swelling. Denies back pain.  : As above.  All other review of systems negative.    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    PSH:   has a past surgical history that includes Rotator cuff repair (Left, 2006); Shoulder arthroscopy w/ rotator cuff repair (Right, 2009); Laminectomy (07/22/2016); Hernia repair (Bilateral, 04/18/2017); Joint replacement (Left, 10/09/2017); Back surgery (2019); lumbar foraminotomy (03/2018); left elbow (10/2017); Revision total hip arthroplasty (Left, 9/11/2018); Esophagogastroduodenoscopy (N/A, 6/30/2020); Colonoscopy (N/A, 6/30/2020); and Injection of joint (Right, 4/14/2023).    FamHx: family history includes  Cancer (age of onset: 50) in his brother; Diabetes in his father, mother, and sister; Drug abuse in his brother; Heart disease in his father and mother; Hypertension in his father and mother; Stroke in his father.    SocHx:  reports that he has never smoked. He has never used smokeless tobacco. He reports that he does not currently use alcohol. He reports that he does not use drugs.      Physical Exam:  Vitals:    11/13/23 1045   BP: 128/74   Pulse: 82   Resp: 16   Temp: 98.5 °F (36.9 °C)     General: A&Ox3, no apparent distress, no deformities  Neck: No masses, normal thyroid  Lungs: normal inspiration, no use of accessory muscles  Heart: normal pulse, no arrhythmias  Abdomen: Soft, NT, ND, no masses, no hernias, no hepatosplenomegaly  Lymphatic: Neck and groin nodes negative  Skin: The skin is warm and dry. No jaundice.  Ext: No c/c/e.  : Test desc digna, no abnormalities of epididymus. Penis  with normal penile and scrotal skin. Meatus normal. Normal rectal tone, no hemorrhoids. Prost 30 gm no nodules or masses appreciated. SV not palpable. Perineum and anus normal.    Labs/Studies:   11/06/2023  EXAM: US RETROPERITONEAL COMPLETE     CLINICAL HISTORY: Acquired renal cyst.     TECHNIQUE: Complete retroperitoneal ultrasound     FINDINGS:  The right kidney measures 11 cm in long axis and the left kidney measures 11.1 cm in long axis.  No stones or hydronephrosis.  There is a 14 mm simple cyst in the right kidney.  Normal appearing blood flow to the right kidney with a resistive index of 0.8.  Resistive index left kidney also appears normal and 0.82.  Urinary bladder appears normal.       Impression:   Stable appearing 13 mm right renal cyst.     Latest Reference Range & Units 06/12/19 10:19 11/04/20 09:39 11/09/21 10:06 11/07/22 10:05   Prostate Specific Antigen 0.00 - 4.00 ng/mL 0.53 0.62 0.83 0.77     Impression/Plan:   1.ED - I reviewed the etiology and management of ED.  Management options include oral  medications, vacuum erectile devices, MUSE urethral suppositories, intracavernosal injections, and surgical placement of a penile prosthesis.  I reviewed the risks and benefits of each.  For medications, I noted that they are safe and effective, but noted that side effects include flushing of the face, headaches, color vision change, blurry vision, and congestion/runny nose. They should not be used by anyone currently taking nitrates for chest pain, and I reviewed the patient's current medications in the chart and verbally with the patient and he is not. For vacuum erectile devices, I noted that they also are safe but that they require the use of a restrictive ring at the base of the penis in order to prevent a detumescence, which can be uncomfortable for some patients. They also have the added benefits of being able to be combined with medical therapy for added effect.  For MUSE, I noted that these are reasonably effective, but that patients usually experience urethral burning, which can also be experienced by the partner, often requiring the use of condoms.  For intracavernosal injections I explained that this is usually the most efficacious medication to achieve a natural erection, but requires injecting the penis, which is not a suitable option for everybody.  For surgical options I reviewed a that there is the surgical risks which include pain, bleeding, and infection.  I noted that an infection of the device would require it to be removed, which can make replacement at a later date very difficult.  I explained that once a prosthesis is implanted the patient will never achieve a natural erection ever again. Rx for viagra sent to pharmacy and instructed patient on obtaining special pricing with [employer]. He knows that this may cause blue-green vision changes, reflux, flushing, headaches, and to not take this with nitro pills.  He understands this may cause chest pain and if so to report to the emergency  department immediately.  Patient understands that this medication can cause death of taken with nitro pills for his heart.  He is understanding of all the side effects, and would still like to proceed.    2. Prostate cancer screening  Exam is unremarkable.  Patient has a low risk of prostate cancer.  Will proceed with a ILEANA in 12 months.    3. BPH  Patient reported feelings of lightheadedness secondary to tamsulosin, medication was discontinued.  Patient has a fairly strong urinary stream without an elevated PVR.  Will remain off of BPH meds.    4. Renal cyst  Renal ultrasound indicated stable, simple cysts, no additional evaluation will be needed.

## 2023-12-11 ENCOUNTER — TELEPHONE (OUTPATIENT)
Dept: HEPATOLOGY | Facility: CLINIC | Age: 83
End: 2023-12-11
Payer: MEDICARE

## 2023-12-11 NOTE — TELEPHONE ENCOUNTER
Attempted to return patient's call no answer but a detailed message was left for patient to return my call.

## 2023-12-11 NOTE — TELEPHONE ENCOUNTER
----- Message from Frankie Rangel sent at 12/11/2023  4:00 PM CST -----  Contact: self  ..Type:  Patient Returning Call    Who Called:.Srinath Mcknight  Who Left Message for Patient:   Does the patient know what this is regarding?: apt   Would the patient rather a call back or a response via [facility]? Call back   Best Call Back Number:.[phone]   Additional Information: pt states he received an call to reschedule and is needing an sooner apt than what was offered in March pt states apt can be scheduled and he would check the portal to see when apt was rescheduled for

## 2023-12-17 ENCOUNTER — OFFICE VISIT (OUTPATIENT)
Dept: URGENT CARE | Facility: CLINIC | Age: 83
End: 2023-12-17
Payer: MEDICARE

## 2023-12-17 VITALS — BODY MASS INDEX: 28.49 KG/M2 | RESPIRATION RATE: 13 BRPM | HEIGHT: 73 IN | WEIGHT: 215 LBS

## 2023-12-17 DIAGNOSIS — R35.0 BENIGN PROSTATIC HYPERPLASIA WITH URINARY FREQUENCY: ICD-10-CM

## 2023-12-17 DIAGNOSIS — N40.1 BENIGN PROSTATIC HYPERPLASIA WITH URINARY FREQUENCY: ICD-10-CM

## 2023-12-17 DIAGNOSIS — R30.0 DYSURIA: Primary | ICD-10-CM

## 2023-12-17 DIAGNOSIS — B37.9 CANDIDIASIS: ICD-10-CM

## 2023-12-17 LAB
BILIRUB UR QL STRIP: POSITIVE
GLUCOSE UR QL STRIP: NEGATIVE
KETONES UR QL STRIP: NEGATIVE
LEUKOCYTE ESTERASE UR QL STRIP: NEGATIVE
PH, POC UA: 5.5
POC BLOOD, URINE: POSITIVE
POC NITRATES, URINE: NEGATIVE
PROT UR QL STRIP: POSITIVE
SP GR UR STRIP: 1.01 (ref 1–1.03)
UROBILINOGEN UR STRIP-ACNC: ABNORMAL (ref 0.3–2.2)

## 2023-12-17 PROCEDURE — 99213 OFFICE O/P EST LOW 20 MIN: CPT | Mod: S$GLB,,,

## 2023-12-17 PROCEDURE — 81003 URINALYSIS AUTO W/O SCOPE: CPT | Mod: QW,S$GLB,,

## 2023-12-17 PROCEDURE — 81003 POCT URINALYSIS, DIPSTICK, AUTOMATED, W/O SCOPE: ICD-10-PCS | Mod: QW,S$GLB,,

## 2023-12-17 PROCEDURE — 99213 PR OFFICE/OUTPT VISIT, EST, LEVL III, 20-29 MIN: ICD-10-PCS | Mod: S$GLB,,,

## 2023-12-17 RX ORDER — KETOCONAZOLE 20 MG/G
CREAM TOPICAL DAILY
Qty: 60 G | Refills: 0 | Status: SHIPPED | OUTPATIENT
Start: 2023-12-17 | End: 2023-12-17

## 2023-12-17 RX ORDER — OXYBUTYNIN CHLORIDE 5 MG/1
5 TABLET ORAL 3 TIMES DAILY
Qty: 30 TABLET | Refills: 0 | Status: SHIPPED | OUTPATIENT
Start: 2023-12-17 | End: 2023-12-27 | Stop reason: ALTCHOICE

## 2023-12-17 RX ORDER — CLOTRIMAZOLE 1 %
CREAM (GRAM) TOPICAL 2 TIMES DAILY
Qty: 60 G | Refills: 0 | Status: SHIPPED | OUTPATIENT
Start: 2023-12-17 | End: 2024-02-29

## 2023-12-17 NOTE — PROGRESS NOTES
"Subjective:      Patient ID: Srinath Mcknight is a 83 y.o. male.    Vitals:  height is 6' 1" (1.854 m) and weight is 97.5 kg (215 lb). His tympanic temperature is 99.8 °F (37.7 °C) (pended). His blood pressure is 130/60 (pended) and his pulse is 77 (pended). His respiration is 13 and oxygen saturation is 96% (pended).     Chief Complaint: Dysuria    82 y/o male presents to clinic with c/o burning and pain with urination. Patient states the issues started about 3 days ago and have gotten really bad. Patient states when he urinates it feels like his whole body is being stabbed with pens. Patient states he took azo's last night that helped him through the night. Patient also c/o a rash between thighs that he have been struggling with for at least  months. Patient states he was given a cream that did not help. Patient states the rash is really itchy and sometimes gets bloody from rubbing against his clothes. Patient stated history of back surgery, has pain in R hip.     Dysuria   This is a new problem. The current episode started in the past 7 days. The problem occurs every urination. The problem has been rapidly worsening. The quality of the pain is described as burning and shooting. The pain is at a severity of 10/10. The pain is severe. There has been no fever. He is Not sexually active. Associated symptoms include frequency, hesitancy, urgency and rash. Pertinent negatives include no behavior changes, discharge, nausea, possible pregnancy, sweats, vomiting, weight loss, bubble bath use, constipation or withholding. Treatments tried: azo. The treatment provided mild relief. His past medical history is significant for diabetes mellitus and hypertension.       Constitution: Negative for fever.   HENT:  Negative for congestion.    Cardiovascular:  Negative for chest pain and palpitations.   Respiratory:  Negative for cough and shortness of breath.    Gastrointestinal:  Negative for nausea, vomiting and constipation. "   Genitourinary:  Positive for dysuria, frequency and urgency.   Musculoskeletal:  Positive for back pain and history of spine disorder.   Skin:  Positive for rash.   Neurological:  Negative for disorientation.   Psychiatric/Behavioral:  Negative for disorientation and confusion.       Objective:     Physical Exam   Constitutional: He is oriented to person, place, and time. normal  HENT:   Head: Normocephalic and atraumatic.   Eyes: Pupils are equal, round, and reactive to light.   Neck: Neck supple.   Cardiovascular: Normal rate.   Pulmonary/Chest: Effort normal.   Abdominal: Normal appearance.   Genitourinary:               Comments: Yeast infection digna groin     Musculoskeletal:      Left hip: He exhibits tenderness.      Lumbar back: He exhibits no tenderness.      Comments: Uses cane    Neurological: no focal deficit. He is alert and oriented to person, place, and time.   Skin: Skin is warm, dry and rash. Capillary refill takes less than 2 seconds.         Comments: Rash to digna groin    Psychiatric: Mood normal.       Assessment:     1. Dysuria    2. Benign prostatic hyperplasia with urinary frequency    3. Candidiasis          Plan:     Patient Instructions   Apply ointment to affected skin area as directed  Follow up with PCP for blood work   Patient notified of negative UTI, but advised to follow up with PCP and/or urology due to protein, bili, urobili in urine and need for further labs/workup.       Dysuria  -     POCT Urinalysis, Dipstick, Automated, W/O Scope    Benign prostatic hyperplasia with urinary frequency  -     oxybutynin (DITROPAN) 5 MG Tab; Take 1 tablet (5 mg total) by mouth 3 (three) times daily. for 10 days  Dispense: 30 tablet; Refill: 0    Candidiasis  -     Discontinue: ketoconazole (NIZORAL) 2 % cream; Apply topically once daily.  Dispense: 60 g; Refill: 0  -     clotrimazole (LOTRIMIN) 1 % cream; Apply topically 2 (two) times daily. for 10 days  Dispense: 60 g; Refill: 0

## 2023-12-17 NOTE — PATIENT INSTRUCTIONS
Apply ointment to affected skin area as directed  Follow up with PCP for blood work   Patient notified of negative UTI, but advised to follow up with PCP and/or urology due to protein, bili, urobili in urine and need for further labs/workup.

## 2023-12-18 ENCOUNTER — TELEPHONE (OUTPATIENT)
Dept: SPORTS MEDICINE | Facility: CLINIC | Age: 83
End: 2023-12-18
Payer: MEDICARE

## 2023-12-18 ENCOUNTER — LAB VISIT (OUTPATIENT)
Dept: LAB | Facility: HOSPITAL | Age: 83
End: 2023-12-18
Attending: INTERNAL MEDICINE
Payer: MEDICARE

## 2023-12-18 ENCOUNTER — OFFICE VISIT (OUTPATIENT)
Dept: INTERNAL MEDICINE | Facility: CLINIC | Age: 83
End: 2023-12-18
Payer: MEDICARE

## 2023-12-18 ENCOUNTER — TELEPHONE (OUTPATIENT)
Dept: ORTHOPEDICS | Facility: CLINIC | Age: 83
End: 2023-12-18
Payer: MEDICARE

## 2023-12-18 VITALS
OXYGEN SATURATION: 97 % | WEIGHT: 216.25 LBS | DIASTOLIC BLOOD PRESSURE: 60 MMHG | TEMPERATURE: 100 F | HEART RATE: 80 BPM | BODY MASS INDEX: 28.53 KG/M2 | SYSTOLIC BLOOD PRESSURE: 112 MMHG

## 2023-12-18 DIAGNOSIS — R10.9 ABDOMINAL PAIN, UNSPECIFIED ABDOMINAL LOCATION: ICD-10-CM

## 2023-12-18 DIAGNOSIS — R35.0 URINARY FREQUENCY: ICD-10-CM

## 2023-12-18 DIAGNOSIS — R35.0 URINARY FREQUENCY: Primary | ICD-10-CM

## 2023-12-18 LAB
ANION GAP SERPL CALC-SCNC: 10 MMOL/L (ref 8–16)
BUN SERPL-MCNC: 16 MG/DL (ref 8–23)
CALCIUM SERPL-MCNC: 9 MG/DL (ref 8.7–10.5)
CHLORIDE SERPL-SCNC: 105 MMOL/L (ref 95–110)
CO2 SERPL-SCNC: 25 MMOL/L (ref 23–29)
CREAT SERPL-MCNC: 1.6 MG/DL (ref 0.5–1.4)
EST. GFR  (NO RACE VARIABLE): 43 ML/MIN/1.73 M^2
GLUCOSE SERPL-MCNC: 144 MG/DL (ref 70–110)
POTASSIUM SERPL-SCNC: 3.4 MMOL/L (ref 3.5–5.1)
SODIUM SERPL-SCNC: 140 MMOL/L (ref 136–145)

## 2023-12-18 PROCEDURE — 99999 PR PBB SHADOW E&M-EST. PATIENT-LVL V: CPT | Mod: PBBFAC,HCNC,, | Performed by: INTERNAL MEDICINE

## 2023-12-18 PROCEDURE — 3074F PR MOST RECENT SYSTOLIC BLOOD PRESSURE < 130 MM HG: ICD-10-PCS | Mod: CPTII,S$GLB,, | Performed by: INTERNAL MEDICINE

## 2023-12-18 PROCEDURE — 3078F DIAST BP <80 MM HG: CPT | Mod: CPTII,S$GLB,, | Performed by: INTERNAL MEDICINE

## 2023-12-18 PROCEDURE — 99214 PR OFFICE/OUTPT VISIT, EST, LEVL IV, 30-39 MIN: ICD-10-PCS | Mod: S$GLB,,, | Performed by: INTERNAL MEDICINE

## 2023-12-18 PROCEDURE — 87086 URINE CULTURE/COLONY COUNT: CPT | Performed by: INTERNAL MEDICINE

## 2023-12-18 PROCEDURE — 3288F PR FALLS RISK ASSESSMENT DOCUMENTED: ICD-10-PCS | Mod: CPTII,S$GLB,, | Performed by: INTERNAL MEDICINE

## 2023-12-18 PROCEDURE — 3288F FALL RISK ASSESSMENT DOCD: CPT | Mod: CPTII,S$GLB,, | Performed by: INTERNAL MEDICINE

## 2023-12-18 PROCEDURE — 87088 URINE BACTERIA CULTURE: CPT | Performed by: INTERNAL MEDICINE

## 2023-12-18 PROCEDURE — 80048 BASIC METABOLIC PNL TOTAL CA: CPT | Performed by: INTERNAL MEDICINE

## 2023-12-18 PROCEDURE — 1160F PR REVIEW ALL MEDS BY PRESCRIBER/CLIN PHARMACIST DOCUMENTED: ICD-10-PCS | Mod: CPTII,S$GLB,, | Performed by: INTERNAL MEDICINE

## 2023-12-18 PROCEDURE — 99999 PR PBB SHADOW E&M-EST. PATIENT-LVL V: ICD-10-PCS | Mod: PBBFAC,HCNC,, | Performed by: INTERNAL MEDICINE

## 2023-12-18 PROCEDURE — 1159F MED LIST DOCD IN RCRD: CPT | Mod: CPTII,S$GLB,, | Performed by: INTERNAL MEDICINE

## 2023-12-18 PROCEDURE — 3074F SYST BP LT 130 MM HG: CPT | Mod: CPTII,S$GLB,, | Performed by: INTERNAL MEDICINE

## 2023-12-18 PROCEDURE — 36415 COLL VENOUS BLD VENIPUNCTURE: CPT | Mod: PO | Performed by: INTERNAL MEDICINE

## 2023-12-18 PROCEDURE — 81001 URINALYSIS AUTO W/SCOPE: CPT | Performed by: INTERNAL MEDICINE

## 2023-12-18 PROCEDURE — 1101F PT FALLS ASSESS-DOCD LE1/YR: CPT | Mod: CPTII,S$GLB,, | Performed by: INTERNAL MEDICINE

## 2023-12-18 PROCEDURE — 1101F PR PT FALLS ASSESS DOC 0-1 FALLS W/OUT INJ PAST YR: ICD-10-PCS | Mod: CPTII,S$GLB,, | Performed by: INTERNAL MEDICINE

## 2023-12-18 PROCEDURE — 85025 COMPLETE CBC W/AUTO DIFF WBC: CPT | Performed by: INTERNAL MEDICINE

## 2023-12-18 PROCEDURE — 87077 CULTURE AEROBIC IDENTIFY: CPT | Performed by: INTERNAL MEDICINE

## 2023-12-18 PROCEDURE — 3078F PR MOST RECENT DIASTOLIC BLOOD PRESSURE < 80 MM HG: ICD-10-PCS | Mod: CPTII,S$GLB,, | Performed by: INTERNAL MEDICINE

## 2023-12-18 PROCEDURE — 99214 OFFICE O/P EST MOD 30 MIN: CPT | Mod: S$GLB,,, | Performed by: INTERNAL MEDICINE

## 2023-12-18 PROCEDURE — 1160F RVW MEDS BY RX/DR IN RCRD: CPT | Mod: CPTII,S$GLB,, | Performed by: INTERNAL MEDICINE

## 2023-12-18 PROCEDURE — 1159F PR MEDICATION LIST DOCUMENTED IN MEDICAL RECORD: ICD-10-PCS | Mod: CPTII,S$GLB,, | Performed by: INTERNAL MEDICINE

## 2023-12-18 PROCEDURE — 87186 SC STD MICRODIL/AGAR DIL: CPT | Performed by: INTERNAL MEDICINE

## 2023-12-18 NOTE — PROGRESS NOTES
Subjective:       Patient ID: Srinath Mcknight is a 83 y.o. male.    Chief Complaint: Hospital Follow Up      HPI:    Patient is an 83-year-old male presenting today with complaint of urinary frequency.  He states the symptoms began just a few days ago.  Over the weekend he went to an urgent care had a dip urinalysis done which showed no infection but apparently showed some blood bilirubin and protein.  I am unsure of the accuracy at the dip based on its findings.  Patient notes that he has had increasing urinary frequency over the last few days.  He does not describe dysuria.  He describes it sort of generalized abdominal pain across the lower abdomen into the left side.  He denies any blood in the urine he denies any true flank pain.  No fevers.  No nausea or vomiting.    Review of Systems    Objective:   /60   Pulse 80   Temp 99.5 °F (37.5 °C)   Wt 98.1 kg (216 lb 4.3 oz)   SpO2 97%   BMI 28.53 kg/m²      Physical Exam  Vitals reviewed.   Constitutional:       Appearance: He is well-developed.   HENT:      Head: Normocephalic and atraumatic.      Right Ear: External ear normal.      Left Ear: External ear normal.   Eyes:      Pupils: Pupils are equal, round, and reactive to light.   Neck:      Thyroid: No thyromegaly.   Cardiovascular:      Rate and Rhythm: Normal rate and regular rhythm.      Heart sounds: Normal heart sounds. No murmur heard.     No friction rub. No gallop.   Pulmonary:      Effort: Pulmonary effort is normal.      Breath sounds: Normal breath sounds. No wheezing or rales.      Comments:   No CVA tenderness.  Abdominal:      General: Bowel sounds are normal. There is no distension.      Palpations: Abdomen is soft.      Tenderness: There is no abdominal tenderness. There is no right CVA tenderness or left CVA tenderness.   Musculoskeletal:      Cervical back: Neck supple.   Psychiatric:         Mood and Affect: Mood normal.         Office Visit on 12/17/2023   Component Date Value     POC Blood, Urine 12/17/2023 Positive (A)     POC Bilirubin, Urine 12/17/2023 Positive (A)     POC Urobilinogen, Urine 12/17/2023 Postive     POC Ketones, Urine 12/17/2023 Negative     POC Protein, Urine 12/17/2023 Positive (A)     POC Nitrates, Urine 12/17/2023 Negative     POC Glucose, Urine 12/17/2023 Negative     pH, UA 12/17/2023 5.5     POC Specific Gravity, Ur* 12/17/2023 1.015     POC Leukocytes, Urine 12/17/2023 Negative        Assessment:       1. Urinary frequency    2. Abdominal pain, unspecified abdominal location        Plan:   No problem-specific Assessment & Plan notes found for this encounter.    Urinary frequency  Comments:  repeat urinalysis, ?bad dip?. Started oxybutynin yesterday.cta renal stone  Orders:  -     Urinalysis, Reflex to Urine Culture Urine, Clean Catch  -     CBC Auto Differential; Future; Expected date: 12/18/2023  -     Basic Metabolic Panel; Future; Expected date: 12/18/2023    Abdominal pain, unspecified abdominal location  -     CT Renal Stone Study ABD Pelvis WO; Future; Expected date: 12/18/2023  -     CBC Auto Differential; Future; Expected date: 12/18/2023  -     Basic Metabolic Panel; Future; Expected date: 12/18/2023      Unclear etiology of symptoms at this time.  Could be bladder spasm verses infection verses BPH and could also include the possibility of renal stone.  We will repeat the urinalysis as well get a CBC and chemistry.  We will plan to move forward with a CT renal stone protocol.  We will review the results as they come in.   We will address the findings as they result out.  He should continue the oxybutynin at this time.  We will determine follow-up based upon the results   And the progression of symptoms.      Follow up in about 9 days (around 12/27/2023) for urinary frequency.

## 2023-12-18 NOTE — TELEPHONE ENCOUNTER
Appointment desk made appt for 12/19/23 with dr. Douglas -- this was scheduled incorrectly     Dr. Douglas does not treat knees --     I called and left message for patient stating that appointment would be canceled   And that he needs to call back to get scheduled with the appropriate ortho providers

## 2023-12-19 DIAGNOSIS — R30.0 DYSURIA: Primary | ICD-10-CM

## 2023-12-19 LAB
BACTERIA #/AREA URNS AUTO: ABNORMAL /HPF
BASOPHILS # BLD AUTO: 0.04 K/UL (ref 0–0.2)
BASOPHILS NFR BLD: 0.4 % (ref 0–1.9)
BILIRUB UR QL STRIP: NEGATIVE
CLARITY UR REFRACT.AUTO: CLEAR
COLOR UR AUTO: YELLOW
DIFFERENTIAL METHOD: ABNORMAL
EOSINOPHIL # BLD AUTO: 0.2 K/UL (ref 0–0.5)
EOSINOPHIL NFR BLD: 1.4 % (ref 0–8)
ERYTHROCYTE [DISTWIDTH] IN BLOOD BY AUTOMATED COUNT: 15.2 % (ref 11.5–14.5)
GLUCOSE UR QL STRIP: NEGATIVE
HCT VFR BLD AUTO: 34.5 % (ref 40–54)
HGB BLD-MCNC: 11.3 G/DL (ref 14–18)
HGB UR QL STRIP: ABNORMAL
HYALINE CASTS UR QL AUTO: 1 /LPF
IMM GRANULOCYTES # BLD AUTO: 0.03 K/UL (ref 0–0.04)
IMM GRANULOCYTES NFR BLD AUTO: 0.3 % (ref 0–0.5)
KETONES UR QL STRIP: NEGATIVE
LEUKOCYTE ESTERASE UR QL STRIP: ABNORMAL
LYMPHOCYTES # BLD AUTO: 2.1 K/UL (ref 1–4.8)
LYMPHOCYTES NFR BLD: 19.2 % (ref 18–48)
MCH RBC QN AUTO: 29.2 PG (ref 27–31)
MCHC RBC AUTO-ENTMCNC: 32.8 G/DL (ref 32–36)
MCV RBC AUTO: 89 FL (ref 82–98)
MICROSCOPIC COMMENT: ABNORMAL
MONOCYTES # BLD AUTO: 1.1 K/UL (ref 0.3–1)
MONOCYTES NFR BLD: 10.3 % (ref 4–15)
NEUTROPHILS # BLD AUTO: 7.6 K/UL (ref 1.8–7.7)
NEUTROPHILS NFR BLD: 68.4 % (ref 38–73)
NITRITE UR QL STRIP: NEGATIVE
NON-SQ EPI CELLS #/AREA URNS AUTO: 1 /HPF
NRBC BLD-RTO: 0 /100 WBC
PH UR STRIP: 6 [PH] (ref 5–8)
PLATELET # BLD AUTO: 168 K/UL (ref 150–450)
PMV BLD AUTO: 11.3 FL (ref 9.2–12.9)
PROT UR QL STRIP: ABNORMAL
RBC # BLD AUTO: 3.87 M/UL (ref 4.6–6.2)
RBC #/AREA URNS AUTO: 12 /HPF (ref 0–4)
SP GR UR STRIP: 1.02 (ref 1–1.03)
SQUAMOUS #/AREA URNS AUTO: 0 /HPF
URN SPEC COLLECT METH UR: ABNORMAL
WBC # BLD AUTO: 11.09 K/UL (ref 3.9–12.7)
WBC #/AREA URNS AUTO: >100 /HPF (ref 0–5)
WBC CLUMPS UR QL AUTO: ABNORMAL

## 2023-12-19 RX ORDER — CIPROFLOXACIN 250 MG/1
250 TABLET, FILM COATED ORAL 2 TIMES DAILY
Qty: 14 TABLET | Refills: 0 | Status: SHIPPED | OUTPATIENT
Start: 2023-12-19 | End: 2023-12-26

## 2023-12-20 LAB — BACTERIA UR CULT: ABNORMAL

## 2023-12-22 RX ORDER — LANCETS 33 GAUGE
EACH MISCELLANEOUS
Qty: 300 EACH | Refills: 3 | Status: SHIPPED | OUTPATIENT
Start: 2023-12-22

## 2023-12-22 NOTE — TELEPHONE ENCOUNTER
Care Due:                  Date            Visit Type   Department     Provider  --------------------------------------------------------------------------------                                EP -                              PRIMARY      [facility] INTERNAL  Last Visit: 12-      CARE ([facility])   MEDICINE       Yeison Wilder  Next Visit: None Scheduled  None         None Found                                                            Last  Test          Frequency    Reason                     Performed    Due Date  --------------------------------------------------------------------------------    Uric Acid...  12 months..  allopurinoL..............  Not Found    Overdue    [employer] Embedded Care Due Messages. Reference number: [id number].   12/21/2023 9:02:10 PM CST

## 2023-12-22 NOTE — TELEPHONE ENCOUNTER
Srinath Mcknight  is requesting a refill authorization.  Brief Assessment and Rationale for Refill:  Approve     Medication Therapy Plan:         Comments:     Note composed:4:40 AM 12/22/2023

## 2023-12-26 ENCOUNTER — TELEPHONE (OUTPATIENT)
Dept: SPORTS MEDICINE | Facility: CLINIC | Age: 83
End: 2023-12-26
Payer: MEDICARE

## 2023-12-26 DIAGNOSIS — M25.561 RIGHT KNEE PAIN, UNSPECIFIED CHRONICITY: Primary | ICD-10-CM

## 2023-12-26 NOTE — TELEPHONE ENCOUNTER
Reached out to pt about their message regarding getting scheduled for an appointment due to right knee pain, pt Is scheduled for 12/24 @ 9:15

## 2023-12-27 ENCOUNTER — OFFICE VISIT (OUTPATIENT)
Dept: INTERNAL MEDICINE | Facility: CLINIC | Age: 83
End: 2023-12-27
Payer: MEDICARE

## 2023-12-27 ENCOUNTER — LAB VISIT (OUTPATIENT)
Dept: LAB | Facility: HOSPITAL | Age: 83
End: 2023-12-27
Attending: INTERNAL MEDICINE
Payer: MEDICARE

## 2023-12-27 VITALS
WEIGHT: 215.19 LBS | DIASTOLIC BLOOD PRESSURE: 68 MMHG | SYSTOLIC BLOOD PRESSURE: 128 MMHG | HEIGHT: 73 IN | OXYGEN SATURATION: 99 % | BODY MASS INDEX: 28.52 KG/M2 | TEMPERATURE: 97 F | HEART RATE: 77 BPM

## 2023-12-27 DIAGNOSIS — R31.9 URINARY TRACT INFECTION WITH HEMATURIA, SITE UNSPECIFIED: Primary | ICD-10-CM

## 2023-12-27 DIAGNOSIS — N39.0 URINARY TRACT INFECTION WITH HEMATURIA, SITE UNSPECIFIED: Primary | ICD-10-CM

## 2023-12-27 DIAGNOSIS — R30.0 DYSURIA: ICD-10-CM

## 2023-12-27 LAB
ANION GAP SERPL CALC-SCNC: 13 MMOL/L (ref 8–16)
BUN SERPL-MCNC: 13 MG/DL (ref 8–23)
CALCIUM SERPL-MCNC: 8.6 MG/DL (ref 8.7–10.5)
CHLORIDE SERPL-SCNC: 110 MMOL/L (ref 95–110)
CO2 SERPL-SCNC: 24 MMOL/L (ref 23–29)
CREAT SERPL-MCNC: 1.4 MG/DL (ref 0.5–1.4)
EST. GFR  (NO RACE VARIABLE): 50.5 ML/MIN/1.73 M^2
GLUCOSE SERPL-MCNC: 94 MG/DL (ref 70–110)
POTASSIUM SERPL-SCNC: 4.3 MMOL/L (ref 3.5–5.1)
SODIUM SERPL-SCNC: 147 MMOL/L (ref 136–145)

## 2023-12-27 PROCEDURE — 1159F PR MEDICATION LIST DOCUMENTED IN MEDICAL RECORD: ICD-10-PCS | Mod: CPTII,S$GLB,, | Performed by: NURSE PRACTITIONER

## 2023-12-27 PROCEDURE — 3078F DIAST BP <80 MM HG: CPT | Mod: CPTII,S$GLB,, | Performed by: NURSE PRACTITIONER

## 2023-12-27 PROCEDURE — 99999 PR PBB SHADOW E&M-EST. PATIENT-LVL V: ICD-10-PCS | Mod: PBBFAC,,, | Performed by: NURSE PRACTITIONER

## 2023-12-27 PROCEDURE — 1160F RVW MEDS BY RX/DR IN RCRD: CPT | Mod: CPTII,S$GLB,, | Performed by: NURSE PRACTITIONER

## 2023-12-27 PROCEDURE — 3074F SYST BP LT 130 MM HG: CPT | Mod: CPTII,S$GLB,, | Performed by: NURSE PRACTITIONER

## 2023-12-27 PROCEDURE — 3288F PR FALLS RISK ASSESSMENT DOCUMENTED: ICD-10-PCS | Mod: CPTII,S$GLB,, | Performed by: NURSE PRACTITIONER

## 2023-12-27 PROCEDURE — 99213 PR OFFICE/OUTPT VISIT, EST, LEVL III, 20-29 MIN: ICD-10-PCS | Mod: S$GLB,,, | Performed by: NURSE PRACTITIONER

## 2023-12-27 PROCEDURE — 1125F AMNT PAIN NOTED PAIN PRSNT: CPT | Mod: CPTII,S$GLB,, | Performed by: NURSE PRACTITIONER

## 2023-12-27 PROCEDURE — 36415 COLL VENOUS BLD VENIPUNCTURE: CPT | Mod: PO | Performed by: INTERNAL MEDICINE

## 2023-12-27 PROCEDURE — 99999 PR PBB SHADOW E&M-EST. PATIENT-LVL V: CPT | Mod: PBBFAC,,, | Performed by: NURSE PRACTITIONER

## 2023-12-27 PROCEDURE — 99213 OFFICE O/P EST LOW 20 MIN: CPT | Mod: S$GLB,,, | Performed by: NURSE PRACTITIONER

## 2023-12-27 PROCEDURE — 1159F MED LIST DOCD IN RCRD: CPT | Mod: CPTII,S$GLB,, | Performed by: NURSE PRACTITIONER

## 2023-12-27 PROCEDURE — 3074F PR MOST RECENT SYSTOLIC BLOOD PRESSURE < 130 MM HG: ICD-10-PCS | Mod: CPTII,S$GLB,, | Performed by: NURSE PRACTITIONER

## 2023-12-27 PROCEDURE — 1101F PT FALLS ASSESS-DOCD LE1/YR: CPT | Mod: CPTII,S$GLB,, | Performed by: NURSE PRACTITIONER

## 2023-12-27 PROCEDURE — 1160F PR REVIEW ALL MEDS BY PRESCRIBER/CLIN PHARMACIST DOCUMENTED: ICD-10-PCS | Mod: CPTII,S$GLB,, | Performed by: NURSE PRACTITIONER

## 2023-12-27 PROCEDURE — 1125F PR PAIN SEVERITY QUANTIFIED, PAIN PRESENT: ICD-10-PCS | Mod: CPTII,S$GLB,, | Performed by: NURSE PRACTITIONER

## 2023-12-27 PROCEDURE — 3078F PR MOST RECENT DIASTOLIC BLOOD PRESSURE < 80 MM HG: ICD-10-PCS | Mod: CPTII,S$GLB,, | Performed by: NURSE PRACTITIONER

## 2023-12-27 PROCEDURE — 80048 BASIC METABOLIC PNL TOTAL CA: CPT | Performed by: INTERNAL MEDICINE

## 2023-12-27 PROCEDURE — 3288F FALL RISK ASSESSMENT DOCD: CPT | Mod: CPTII,S$GLB,, | Performed by: NURSE PRACTITIONER

## 2023-12-27 PROCEDURE — 1101F PR PT FALLS ASSESS DOC 0-1 FALLS W/OUT INJ PAST YR: ICD-10-PCS | Mod: CPTII,S$GLB,, | Performed by: NURSE PRACTITIONER

## 2023-12-27 NOTE — PROGRESS NOTES
"Subjective:       Patient ID: Srinath Mcknight is a 83 y.o. male.    Chief Complaint: Follow-up (9 days f/u)    Patient here for follow up Urinary Tract Infection  Went to the Emergency Room and was given ditripan  Then saw Dr. GUEVARA and was given cipro for Urinary Tract Infection  Bmp and cbc were done at that time also  Patient had repeat bmp and urine today that is pending  He states urinary frequency, nocturia and dysuria have resolve  He completed full course of cipro  Left sided flank pain is better    Follow-up  Pertinent negatives include no arthralgias, chest pain, chills, diaphoresis, fatigue, fever, headaches, myalgias or rash.       /68   Pulse 77   Temp 97.4 °F (36.3 °C) (Tympanic)   Ht 6' 1" (1.854 m)   Wt 97.6 kg (215 lb 2.7 oz)   SpO2 99%   BMI 28.39 kg/m²     Review of Systems   Constitutional:  Negative for activity change, appetite change, chills, diaphoresis, fatigue, fever and unexpected weight change.   HENT: Negative.     Eyes: Negative.    Respiratory:  Negative for apnea, chest tightness, shortness of breath and stridor.    Cardiovascular:  Negative for chest pain, palpitations and leg swelling.   Gastrointestinal: Negative.    Endocrine: Negative.    Genitourinary: Negative.    Musculoskeletal:  Negative for arthralgias and myalgias.   Skin:  Negative for color change, pallor, rash and wound.   Allergic/Immunologic: Negative.    Neurological:  Negative for dizziness, facial asymmetry, light-headedness and headaches.   Hematological:  Negative for adenopathy.   Psychiatric/Behavioral:  Negative for agitation and behavioral problems.        Objective:      Physical Exam  Vitals and nursing note reviewed.   Constitutional:       General: He is not in acute distress.     Appearance: He is well-developed. He is not diaphoretic.   HENT:      Head: Normocephalic and atraumatic.   Cardiovascular:      Rate and Rhythm: Normal rate and regular rhythm.      Heart sounds: Normal heart sounds. "   Pulmonary:      Effort: Pulmonary effort is normal. No respiratory distress.      Breath sounds: Normal breath sounds.   Abdominal:      General: There is no distension.      Palpations: There is no mass.      Tenderness: There is no abdominal tenderness.      Hernia: No hernia is present.   Skin:     General: Skin is warm and dry.      Findings: No rash.   Neurological:      Mental Status: He is alert and oriented to person, place, and time.   Psychiatric:         Behavior: Behavior normal.         Thought Content: Thought content normal.         Judgment: Judgment normal.         Assessment:       1. Urinary tract infection with hematuria, site unspecified        Plan:       [place] was seen today for follow-up.    Diagnoses and all orders for this visit:    Urinary tract infection with hematuria, site unspecified      Completed antibiotics  Repeat urine pending  If blood present on repeat urine; recommend he complete ct renal stone study Dr. GUEVARA ordered  Stay well hydrated  Follow up if symptoms recur

## 2023-12-29 NOTE — PROGRESS NOTES
Orthopaedic Follow-Up Visit    Last Appointment: 11/9/22  Diagnosis: Right knee mild osteoarthritis  Prior Procedure: Right knee CSI     Srinath Mcknight is a 83 y.o. male who is here for f/u evaluation of his right knee. The patient was last seen here by me on 11/9/22 at which point he had responded well to corticosteroid injection 1 year prior, but over last couple months his symptoms had returned. We proceeded with right knee intra-articular corticosteroid injection. The patient returns today reporting that his symptoms have returned and know has pain every day. He has been using some topical ointments with some relief. He reports he underwent Synvisc-One in the right knee with Dr. Brooks on 4/14/23, but reports minimal relief from this injection. He reports good relief with prior CSI. He reports most of his symptoms occur with ambulation.    To review his history, Srinath Mcknight is a 83 y.o. year old male patient presented on 11/10/21 with pain and dysfunction involving the right knee that began approximately 3 years ago with no specific injury but reported a gradual onset of symptoms. His symptoms included pain in the knee , localized medial , pain quality is intermittent aching.  He also reports giving way episodes that can lead to falls. His pain was aggravated by lying down at night and walking. XR showed mild knee arthritis. He had tried rest and topical ointments. We proceeded with right knee corticosteroid injection and referral to physical therapy.      Patient's medications, allergies, past medical, surgical, social and family histories were reviewed and updated as appropriate.    Review of Systems   All systems reviewed were negative.  Specifically, the patient denies fever, chills, weight loss, chest pain, shortness of breath, or dyspnea on exertion.      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 Skin is intact. Neuro is normal with no focal motor or sensory findings.    Standing exam  stance: normal alignment, no significant leg-length discrepancy  gait: antalgic, uses a cane for stability      Knee      RIGHT  LEFT  Skin:     Intact   Intact  ROM:     0-130  0-130  Effusion:    Neg   Neg  Medial joint line tenderness:  +   Neg  Lateral joint line tenderness:  Neg   Neg  Sincere:     Neg   Neg  Patella crepitus:   +   Neg  Patella tenderness:  +   Neg  Patella grind:      Neg   Neg  Valgus stress:    Neg   Neg  Varus stress:    Neg   Neg  Posterior drawer:   Neg   Neg  N-V               intact  intact  Hip:    nml    nml   Lower extremity edema: Negative negative    Neurovascular exam  - motor function grossly intact bilaterally to hip flexion, knee extension and flexion, ankle dorsiflexion and plantarflexion  - sensation intact to light touch bilaterally to femoral, tibial, tibial and peroneal distributions  - symmetrical pedal pulses    Imaging:   XR Results:  Results for orders placed during the hospital encounter of 05/11/21    X-ray Knee Ortho Bilateral with Flexion    Narrative  EXAMINATION:  XR KNEE ORTHO BILAT WITH FLEXION    CLINICAL HISTORY:  Pain in right  knee    TECHNIQUE:  AP standing of both knees, PA flexion standing views of both knees, and Merchant views of both knees were performed.  Lateral views of both knees were also performed.    COMPARISON:  December 14, 2017    FINDINGS:  Bilateral tricompartment degenerative changes.  There is increased narrowing medial compartments.  No fracture, dislocation or effusions.  Soft tissues within normal limits.    Impression  Bilateral degenerative change without fracture or dislocation.      Electronically signed by: Ismael Brown MD  Date:    05/11/2021  Time:    14:53      MRI Results:  No results found for this or any previous visit.      CT Results:  No results found for this or any previous visit.      Physician Read: I agree with the above impression.    Assessment/Plan:   Assessment:  Srinath Mcknight is a 83 y.o. male with right knee mild osteoarthritis    Plan:    He has right knee osteoarthritis that responded well to corticosteroid injections in the past. He also had Synvisco-One injection with Dr. Brooks in April 2023 and reports about 50% relief but only for a short period following this injection.   I recommend proceeding with right knee intra-articular corticosteroid injection. We will also submit for prior authorization for potential repeat viscosupplementation injection. I will also plan to reach out to pain management to discuss trying to resubmit for genicular nerve block versus possible lumbar spine GAEL. The patient is in agreement with this plan.   Procedure performed today and patient tolerated the procedure well with no immediate complications.   MEDICAL NECESSITY FOR VISCOSUPPLEMETNATION: After thorough evaluation of the patient, I have determined that visco-supplementation is medically necessary. The patient has painful degenerative changes of the knee with failure of conservative treatments including lifestyle modifications and rehabilitation exercises.  Oral analgesis/NSAIDs have not adequately  controlled symptoms and there is radiographic evidence of Kellgren Donn grade 2 or greater osteoarthritic changes, or in lack of radiographic evidence, there is arthroscopic or other evidence of chondrosis.   Follow-up with Lauren Leary in 6 weeks for viscosupplementation injection.         Shan Winters MD    I, Dom Garcia, acted as a scribe for Shan Winters MD for the duration of this office visit.

## 2024-01-02 ENCOUNTER — OFFICE VISIT (OUTPATIENT)
Dept: SPORTS MEDICINE | Facility: CLINIC | Age: 84
End: 2024-01-02
Payer: MEDICARE

## 2024-01-02 ENCOUNTER — HOSPITAL ENCOUNTER (OUTPATIENT)
Dept: RADIOLOGY | Facility: HOSPITAL | Age: 84
Discharge: HOME | End: 2024-01-02
Attending: STUDENT IN AN ORGANIZED HEALTH CARE EDUCATION/TRAINING PROGRAM
Payer: MEDICARE

## 2024-01-02 VITALS — RESPIRATION RATE: 17 BRPM | WEIGHT: 215 LBS | HEIGHT: 73 IN | BODY MASS INDEX: 28.49 KG/M2

## 2024-01-02 DIAGNOSIS — M25.561 RIGHT KNEE PAIN, UNSPECIFIED CHRONICITY: ICD-10-CM

## 2024-01-02 DIAGNOSIS — M17.11 PRIMARY OSTEOARTHRITIS OF RIGHT KNEE: Primary | ICD-10-CM

## 2024-01-02 PROCEDURE — 3288F FALL RISK ASSESSMENT DOCD: CPT | Mod: HCNC,CPTII,S$GLB, | Performed by: STUDENT IN AN ORGANIZED HEALTH CARE EDUCATION/TRAINING PROGRAM

## 2024-01-02 PROCEDURE — 73564 X-RAY EXAM KNEE 4 OR MORE: CPT | Mod: 26,HCNC,RT, | Performed by: RADIOLOGY

## 2024-01-02 PROCEDURE — 1125F AMNT PAIN NOTED PAIN PRSNT: CPT | Mod: HCNC,CPTII,S$GLB, | Performed by: STUDENT IN AN ORGANIZED HEALTH CARE EDUCATION/TRAINING PROGRAM

## 2024-01-02 PROCEDURE — 99999 PR PBB SHADOW E&M-EST. PATIENT-LVL IV: CPT | Mod: PBBFAC,HCNC,, | Performed by: STUDENT IN AN ORGANIZED HEALTH CARE EDUCATION/TRAINING PROGRAM

## 2024-01-02 PROCEDURE — 73562 X-RAY EXAM OF KNEE 3: CPT | Mod: 59,TC,HCNC,LT

## 2024-01-02 PROCEDURE — 99214 OFFICE O/P EST MOD 30 MIN: CPT | Mod: HCNC,25,S$GLB, | Performed by: STUDENT IN AN ORGANIZED HEALTH CARE EDUCATION/TRAINING PROGRAM

## 2024-01-02 PROCEDURE — 1160F RVW MEDS BY RX/DR IN RCRD: CPT | Mod: HCNC,CPTII,S$GLB, | Performed by: STUDENT IN AN ORGANIZED HEALTH CARE EDUCATION/TRAINING PROGRAM

## 2024-01-02 PROCEDURE — 1159F MED LIST DOCD IN RCRD: CPT | Mod: HCNC,CPTII,S$GLB, | Performed by: STUDENT IN AN ORGANIZED HEALTH CARE EDUCATION/TRAINING PROGRAM

## 2024-01-02 PROCEDURE — 20610 DRAIN/INJ JOINT/BURSA W/O US: CPT | Mod: HCNC,RT,S$GLB, | Performed by: STUDENT IN AN ORGANIZED HEALTH CARE EDUCATION/TRAINING PROGRAM

## 2024-01-02 PROCEDURE — 1101F PT FALLS ASSESS-DOCD LE1/YR: CPT | Mod: HCNC,CPTII,S$GLB, | Performed by: STUDENT IN AN ORGANIZED HEALTH CARE EDUCATION/TRAINING PROGRAM

## 2024-01-02 RX ORDER — TRIAMCINOLONE ACETONIDE 40 MG/ML
40 INJECTION, SUSPENSION INTRA-ARTICULAR; INTRAMUSCULAR
Status: DISCONTINUED | OUTPATIENT
Start: 2024-01-02 | End: 2024-01-02 | Stop reason: HOSPADM

## 2024-01-02 RX ADMIN — TRIAMCINOLONE ACETONIDE 40 MG: 40 INJECTION, SUSPENSION INTRA-ARTICULAR; INTRAMUSCULAR at 09:01

## 2024-01-02 NOTE — PROCEDURES
Large Joint Aspiration/Injection: R knee    Date/Time: 1/2/2024 9:15 AM    Performed by: Shan Winters MD  Authorized by: Shan Winters MD    Consent Done?:  Yes (Verbal)  Indications:  Pain and arthritis  Site marked: the procedure site was marked    Timeout: prior to procedure the correct patient, procedure, and site was verified    Prep: patient was prepped and draped in usual sterile fashion      Local anesthesia used?: Yes    Anesthesia:  Local infiltration  Local anesthetic:  Lidocaine 1% without epinephrine    Details:  Needle Size:  22 G  Ultrasonic Guidance for needle placement?: No    Approach:  Anterolateral  Location:  Knee  Site:  R knee  Medications:  40 mg triamcinolone acetonide 40 mg/mL  Patient tolerance:  Patient tolerated the procedure well with no immediate complications

## 2024-01-04 ENCOUNTER — OFFICE VISIT (OUTPATIENT)
Dept: GASTROENTEROLOGY | Facility: CLINIC | Age: 84
End: 2024-01-04
Payer: MEDICARE

## 2024-01-04 VITALS
DIASTOLIC BLOOD PRESSURE: 76 MMHG | SYSTOLIC BLOOD PRESSURE: 153 MMHG | HEART RATE: 73 BPM | BODY MASS INDEX: 28.81 KG/M2 | HEIGHT: 73 IN | WEIGHT: 217.38 LBS

## 2024-01-04 DIAGNOSIS — K59.09 CHRONIC CONSTIPATION: Primary | ICD-10-CM

## 2024-01-04 DIAGNOSIS — R14.0 BLOATING: ICD-10-CM

## 2024-01-04 PROCEDURE — 3288F FALL RISK ASSESSMENT DOCD: CPT | Mod: HCNC,CPTII,S$GLB, | Performed by: NURSE PRACTITIONER

## 2024-01-04 PROCEDURE — 3078F DIAST BP <80 MM HG: CPT | Mod: HCNC,CPTII,S$GLB, | Performed by: NURSE PRACTITIONER

## 2024-01-04 PROCEDURE — 1159F MED LIST DOCD IN RCRD: CPT | Mod: HCNC,CPTII,S$GLB, | Performed by: NURSE PRACTITIONER

## 2024-01-04 PROCEDURE — 1101F PT FALLS ASSESS-DOCD LE1/YR: CPT | Mod: HCNC,CPTII,S$GLB, | Performed by: NURSE PRACTITIONER

## 2024-01-04 PROCEDURE — 1126F AMNT PAIN NOTED NONE PRSNT: CPT | Mod: HCNC,CPTII,S$GLB, | Performed by: NURSE PRACTITIONER

## 2024-01-04 PROCEDURE — 99999 PR PBB SHADOW E&M-EST. PATIENT-LVL V: CPT | Mod: PBBFAC,HCNC,, | Performed by: NURSE PRACTITIONER

## 2024-01-04 PROCEDURE — 99214 OFFICE O/P EST MOD 30 MIN: CPT | Mod: HCNC,S$GLB,, | Performed by: NURSE PRACTITIONER

## 2024-01-04 PROCEDURE — 3077F SYST BP >= 140 MM HG: CPT | Mod: HCNC,CPTII,S$GLB, | Performed by: NURSE PRACTITIONER

## 2024-01-04 RX ORDER — LUBIPROSTONE 24 UG/1
24 CAPSULE ORAL 2 TIMES DAILY WITH MEALS
Qty: 60 CAPSULE | Refills: 2 | Status: SHIPPED | OUTPATIENT
Start: 2024-01-04

## 2024-01-04 NOTE — PROGRESS NOTES
Clinic Consult:  [facility] Consultation Note    Reason for Consult:  The primary encounter diagnosis was Chronic constipation. A diagnosis of Bloating was also pertinent to this visit.    PCP: Yeison Wilder   [id number] [facility] [address] / [place] [id number]    HPI:  This is a 83 y.o. male here for evaluation of the above  Pt reports a long history of constipation that he has been managing with PRN OTC medications without success.  He reports that he will often go a week without having a BM  Has tried miralax, stool softeners and stimulant laxatives without any significant improvement  In October, he was seen by PCP and x-ray showed moderate constipation.  He was given a bowel purge which he completed one bottle and had good BM, but the symptoms shortly returned.   Denies any significant abdominal pain.   No melena or hematochezia.       Review of Systems   Constitutional:  Negative for chills, fever, malaise/fatigue and weight loss.   Respiratory:  Negative for cough.    Cardiovascular:  Negative for chest pain.   Gastrointestinal:         Per HPI   Musculoskeletal:  Negative for myalgias.   Skin:  Negative for itching and rash.   Neurological:  Negative for headaches.   Psychiatric/Behavioral:  The patient is not nervous/anxious.        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    Trouble in  sleeping     Type 2 diabetes mellitus with diabetic polyneuropathy, without long-term current use of insulin     Urinary incontinence     Uses hearing aid     bilat       Surgical History:  Past Surgical History:   Procedure Laterality Date    BACK SURGERY  2019    COLONOSCOPY N/A 6/30/2020    Procedure: COLONOSCOPY;  Surgeon: Joseph Iraheta MD;  Location: [facility] ENDO;  Service: Endoscopy;  Laterality: N/A;    ESOPHAGOGASTRODUODENOSCOPY N/A 6/30/2020    Procedure: EGD (ESOPHAGOGASTRODUODENOSCOPY);  Surgeon: Joseph Iraheta MD;  Location: [facility] ENDO;  Service: Endoscopy;  Laterality: N/A;    HERNIA REPAIR Bilateral 04/18/2017    INJECTION OF JOINT Right 4/14/2023    Procedure: right Synvisc Knee injection;  Surgeon: Hossein Brooks MD;  Location: [facility] PAIN MGT;  Service: Pain Management;  Laterality: Right;    JOINT REPLACEMENT Left 10/09/2017    L YAHIR Dr. Alcocer    LAMINECTOMY  07/22/2016    left elbow  10/2017    procedure to remove gout    lumbar foraminotomy  03/2018    REVISION TOTAL HIP ARTHROPLASTY Left 9/11/2018    Procedure: REVISION, TOTAL ARTHROPLASTY, HIP;  Surgeon: Albaro Alcocer Sr., MD;  Location: [facility] OR;  Service: Orthopedics;  Laterality: Left;    ROTATOR CUFF REPAIR Left 2006    SHOULDER ARTHROSCOPY W/ ROTATOR CUFF REPAIR Right 2009       Family History:   Family History   Problem Relation Age of Onset    Hypertension Mother     Heart disease Mother     Diabetes Mother     Hypertension Father     Heart disease Father     Diabetes Father     Stroke Father     Diabetes Sister     Cancer Brother 50        pancreas    Drug abuse Brother     Prostate cancer Neg Hx     Macular degeneration Neg Hx     Retinal detachment Neg Hx     Strabismus Neg Hx     Glaucoma Neg Hx     Blindness Neg Hx     Amblyopia Neg Hx     Kidney disease Neg Hx     Mental illness Neg Hx     Intellectual disability Neg Hx     COPD Neg Hx     Asthma Neg Hx        Social History:   Social History     Tobacco Use     Smoking status: Never     Passive exposure: Never    Smokeless tobacco: Never   Substance Use Topics    Alcohol use: Not Currently    Drug use: No       Allergies: Reviewed    Home Medications:   Current Outpatient Medications on File Prior to Visit   Medication Sig Dispense Refill    allopurinoL (ZYLOPRIM) 100 MG tablet TAKE 1 TABLET EVERY DAY 90 tablet 3    bisacodyl (DULCOLAX) 10 mg Supp   0    blood glucose control, low Soln by Misc.(Non-Drug; Combo Route) route.      blood sugar diagnostic (TRUE METRIX GLUCOSE TEST STRIP) Strp Use as directed to check sugars 100 strip 11    blood-glucose meter (TRUE METRIX AIR GLUCOSE METER) kit USE AS DIRECTED 1 each 0    cetirizine (ZYRTEC) 10 MG tablet Take 1 tablet (10 mg total) by mouth every evening. for 14 days 14 tablet 0    clonazePAM (KLONOPIN) 0.5 MG tablet TAKE 1 TABLET BY MOUTH EVERY EVENING 30 tablet 2    clotrimazole (LOTRIMIN) 1 % cream Apply topically 2 (two) times daily. for 10 days 60 g 0    cyanocobalamin, vitamin B-12, 1,000 mcg Subl Place 1,000 mcg under the tongue once daily. 90 tablet 3    diclofenac sodium (VOLTAREN) 1 % Gel Apply 2 g topically 4 (four) times daily as needed (pain). 200 g 2    GARLIC EXTRACT ORAL Take 1 tablet by mouth once daily. Per [facility]      JARDIANCE 10 mg tablet       LEXISCAN 0.4 mg/5 mL Syrg       losartan (COZAAR) 50 MG tablet Take 1 tablet (50 mg total) by mouth once daily. 90 tablet 0    melatonin 10 mg Tab Take 1 tablet (10 mg total) by mouth every evening. 30 tablet 11    metFORMIN (GLUCOPHAGE) 1000 MG tablet TAKE 1 TABLET TWICE DAILY WITH MEALS 180 tablet 0    metoprolol succinate (TOPROL-XL) 50 MG 24 hr tablet TAKE 1 TABLET EVERY DAY 90 tablet 3    mometasone 0.1% (ELOCON) 0.1 % cream Apply topically once daily. 50 g 2    multivitamin (THERAGRAN) per tablet Take 1 tablet by mouth once daily.      pramipexole (MIRAPEX) 0.125 MG tablet TAKE 1 TABLET( 0.125 MG) BY MOUTH EVERY EVENING 30 tablet 11    pravastatin  "(PRAVACHOL) 40 MG tablet TAKE 1 TABLET EVERY DAY 90 tablet 2    pregabalin (LYRICA) 75 MG capsule Take 1-2 capsules ( mg total) by mouth every evening. 180 capsule 1    sildenafiL (VIAGRA) 100 MG tablet Take 1 tablet (100 mg total) by mouth daily as needed for Erectile Dysfunction. 15 tablet 5    TRUEPLUS LANCETS 33 gauge Misc USE AS DIRECTED TO CHECK SUGARS 300 each 3     No current facility-administered medications on file prior to visit.       Physical Exam:  Vital Signs:  BP (!) 153/76 (BP Location: Right arm, Patient Position: Sitting, BP Method: Medium (Automatic))   Pulse 73   Ht 6' 1" (1.854 m)   Wt 98.6 kg (217 lb 6 oz)   BMI 28.68 kg/m²   Body mass index is 28.68 kg/m².  Physical Exam  Vitals reviewed.   Constitutional:       Appearance: He is well-developed.   HENT:      Head: Normocephalic.   Eyes:      General: No scleral icterus.  Cardiovascular:      Rate and Rhythm: Normal rate.   Pulmonary:      Effort: Pulmonary effort is normal.   Abdominal:      General: There is no distension.      Palpations: Abdomen is soft.   Musculoskeletal:         General: Normal range of motion.      Cervical back: Normal range of motion.   Skin:     General: Skin is dry.   Neurological:      Mental Status: He is alert and oriented to person, place, and time.         Labs: Pertinent labs reviewed.      Assessment:  1. Chronic constipation    2. Bloating         Recommendations:  Will have him complete the other bottle of suprep that he has previously been given by PCP.   After completing this, he will start Amitiza up to BID.   Increase water and dietary fiber intake  May need to add an additional stool softener or miralax if the constipation geronimo not significantly improve       F/U in 6-8 weeks to ensure improvement in symptoms.         Thank you so much for allowing me to participate in the care of [place] ZEINAB Moreno    "

## 2024-01-22 DIAGNOSIS — E11.42 TYPE 2 DIABETES MELLITUS WITH DIABETIC POLYNEUROPATHY, WITHOUT LONG-TERM CURRENT USE OF INSULIN: Primary | Chronic | ICD-10-CM

## 2024-01-23 LAB
LEFT EYE DM RETINOPATHY: NEGATIVE
RIGHT EYE DM RETINOPATHY: NEGATIVE

## 2024-01-23 NOTE — TELEPHONE ENCOUNTER
Care Due:                  Date            Visit Type   Department     Provider  --------------------------------------------------------------------------------                                EP -                              PRIMARY      [facility] INTERNAL  Last Visit: 12-      CARE ([facility])   MEDICINE       Yeison Wilder  Next Visit: None Scheduled  None         None Found                                                            Last  Test          Frequency    Reason                     Performed    Due Date  --------------------------------------------------------------------------------    HBA1C.......  6 months...  metFORMIN................  10-   04-    [facility] Embedded Care Due Messages. Reference number: [id number].   1/22/2024 8:34:02 PM CST

## 2024-01-24 ENCOUNTER — PATIENT OUTREACH (OUTPATIENT)
Dept: ADMINISTRATIVE | Facility: HOSPITAL | Age: 84
End: 2024-01-24
Payer: MEDICARE

## 2024-01-24 NOTE — TELEPHONE ENCOUNTER
Refill Routing Note   Medication(s) are not appropriate for processing by [facility] for the following reason(s):        Clarification of medication (Rx) details: please clarify testing frequency for insurance purposes    [facility] action(s):  Defer     Requires labs : Yes (A1C due 4/9/24)            Appointments  past 12m or future 3m with PCP    Date Provider   Last Visit   12/18/2023 Yeison Wilder MD   Next Visit   Visit date not found Yeison Wilder MD   ED visits in past 90 days: 0        Note composed:5:55 PM 01/24/2024

## 2024-01-25 RX ORDER — CALCIUM CITRATE/VITAMIN D3 200MG-6.25
TABLET ORAL
Qty: 300 STRIP | Refills: 3 | Status: SHIPPED | OUTPATIENT
Start: 2024-01-25

## 2024-02-16 ENCOUNTER — PROCEDURE VISIT (OUTPATIENT)
Dept: SPORTS MEDICINE | Facility: CLINIC | Age: 84
End: 2024-02-16
Payer: MEDICARE

## 2024-02-16 VITALS — RESPIRATION RATE: 17 BRPM | WEIGHT: 217.38 LBS | HEIGHT: 73 IN | BODY MASS INDEX: 28.81 KG/M2

## 2024-02-16 DIAGNOSIS — M17.11 PRIMARY OSTEOARTHRITIS OF RIGHT KNEE: Primary | ICD-10-CM

## 2024-02-16 PROCEDURE — 20610 DRAIN/INJ JOINT/BURSA W/O US: CPT | Mod: HCNC,RT,S$GLB, | Performed by: PHYSICIAN ASSISTANT

## 2024-02-16 PROCEDURE — 99499 UNLISTED E&M SERVICE: CPT | Mod: HCNC,S$GLB,, | Performed by: PHYSICIAN ASSISTANT

## 2024-02-16 NOTE — PROCEDURES
Large Joint Aspiration/Injection: R knee    Date/Time: 2/16/2024 11:15 AM    Performed by: Lauren Leary PA-C  Authorized by: Lauren Leary PA-C    Consent Done?:  Yes (Verbal)  Indications:  Joint swelling and pain  Site marked: the procedure site was marked    Timeout: prior to procedure the correct patient, procedure, and site was verified    Prep: patient was prepped and draped in usual sterile fashion      Local anesthesia used?: Yes    Local anesthetic:  Topical anesthetic    Details:  Needle Size:  22 G  Ultrasonic Guidance for needle placement?: No    Approach:  Anterolateral  Location:  Knee  Site:  R knee  Medications:  30 mg sodium hyaluronate (orthovisc) 30 mg/2 mL  Aspirate amount (mL):  0  Patient tolerance:  Patient tolerated the procedure well with no immediate complications    Right Orthovisc 1/3

## 2024-02-23 ENCOUNTER — PROCEDURE VISIT (OUTPATIENT)
Dept: SPORTS MEDICINE | Facility: CLINIC | Age: 84
End: 2024-02-23
Payer: MEDICARE

## 2024-02-23 VITALS — WEIGHT: 217 LBS | HEIGHT: 73 IN | RESPIRATION RATE: 17 BRPM | BODY MASS INDEX: 28.76 KG/M2

## 2024-02-23 DIAGNOSIS — M17.11 PRIMARY OSTEOARTHRITIS OF RIGHT KNEE: Primary | ICD-10-CM

## 2024-02-23 PROCEDURE — 99499 UNLISTED E&M SERVICE: CPT | Mod: HCNC,S$GLB,, | Performed by: PHYSICIAN ASSISTANT

## 2024-02-23 PROCEDURE — 20610 DRAIN/INJ JOINT/BURSA W/O US: CPT | Mod: HCNC,RT,S$GLB, | Performed by: PHYSICIAN ASSISTANT

## 2024-02-23 NOTE — PROCEDURES
Large Joint Aspiration/Injection: R knee    Date/Time: 2/23/2024 11:30 AM    Performed by: Lauren Leary PA-C  Authorized by: Lauren Leary PA-C    Consent Done?:  Yes (Verbal)  Indications:  Joint swelling and pain  Site marked: the procedure site was marked    Timeout: prior to procedure the correct patient, procedure, and site was verified    Prep: patient was prepped and draped in usual sterile fashion      Local anesthesia used?: Yes    Local anesthetic:  Topical anesthetic    Details:  Needle Size:  22 G  Ultrasonic Guidance for needle placement?: No    Approach:  Anterolateral  Location:  Knee  Site:  R knee  Medications:  30 mg sodium hyaluronate (orthovisc) 30 mg/2 mL  Aspirate amount (mL):  0  Patient tolerance:  Patient tolerated the procedure well with no immediate complications    Right knee orthovisc 2/3

## 2024-02-24 RX ORDER — METOPROLOL SUCCINATE 50 MG/1
TABLET, EXTENDED RELEASE ORAL
Qty: 90 TABLET | Refills: 3 | Status: SHIPPED | OUTPATIENT
Start: 2024-02-24

## 2024-02-24 NOTE — TELEPHONE ENCOUNTER
Refill Routing Note   Refill Routing Note   Medication(s) are not appropriate for processing by [facility] for the following reason(s):        Required labs outdated    [facility] action(s):  Defer   Requires labs : Yes      Medication Therapy Plan: URIC ACID      Appointments  past 12m or future 3m with PCP    Date Provider   Last Visit   12/18/2023 Yeison Wilder MD   Next Visit   Visit date not found Yeison Wilder MD   ED visits in past 90 days: 0        Note composed:6:51 AM 02/24/2024

## 2024-02-24 NOTE — TELEPHONE ENCOUNTER
Care Due:                  Date            Visit Type   Department     Provider  --------------------------------------------------------------------------------                                EP -                              PRIMARY      [facility] INTERNAL  Last Visit: 12-      CARE ([facility])   MEDICINE       Yeison Wilder  Next Visit: None Scheduled  None         None Found                                                            Last  Test          Frequency    Reason                     Performed    Due Date  --------------------------------------------------------------------------------    Uric Acid...  12 months..  allopurinoL..............  Not Found    Overdue    [employer] Embedded Care Due Messages. Reference number: [id number].   2/24/2024 2:18:08 AM CST

## 2024-02-29 ENCOUNTER — OFFICE VISIT (OUTPATIENT)
Dept: GASTROENTEROLOGY | Facility: CLINIC | Age: 84
End: 2024-02-29
Payer: MEDICARE

## 2024-02-29 VITALS
DIASTOLIC BLOOD PRESSURE: 64 MMHG | WEIGHT: 214.5 LBS | HEIGHT: 73 IN | BODY MASS INDEX: 28.43 KG/M2 | HEART RATE: 80 BPM | SYSTOLIC BLOOD PRESSURE: 120 MMHG

## 2024-02-29 DIAGNOSIS — R14.0 BLOATING: ICD-10-CM

## 2024-02-29 DIAGNOSIS — K59.09 CHRONIC CONSTIPATION: Primary | ICD-10-CM

## 2024-02-29 PROCEDURE — 3078F DIAST BP <80 MM HG: CPT | Mod: HCNC,CPTII,S$GLB, | Performed by: NURSE PRACTITIONER

## 2024-02-29 PROCEDURE — 1159F MED LIST DOCD IN RCRD: CPT | Mod: HCNC,CPTII,S$GLB, | Performed by: NURSE PRACTITIONER

## 2024-02-29 PROCEDURE — 99999 PR PBB SHADOW E&M-EST. PATIENT-LVL IV: CPT | Mod: PBBFAC,HCNC,, | Performed by: NURSE PRACTITIONER

## 2024-02-29 PROCEDURE — 99214 OFFICE O/P EST MOD 30 MIN: CPT | Mod: HCNC,S$GLB,, | Performed by: NURSE PRACTITIONER

## 2024-02-29 PROCEDURE — 3074F SYST BP LT 130 MM HG: CPT | Mod: HCNC,CPTII,S$GLB, | Performed by: NURSE PRACTITIONER

## 2024-02-29 PROCEDURE — 1125F AMNT PAIN NOTED PAIN PRSNT: CPT | Mod: HCNC,CPTII,S$GLB, | Performed by: NURSE PRACTITIONER

## 2024-02-29 PROCEDURE — 3288F FALL RISK ASSESSMENT DOCD: CPT | Mod: HCNC,CPTII,S$GLB, | Performed by: NURSE PRACTITIONER

## 2024-02-29 PROCEDURE — 1101F PT FALLS ASSESS-DOCD LE1/YR: CPT | Mod: HCNC,CPTII,S$GLB, | Performed by: NURSE PRACTITIONER

## 2024-02-29 RX ORDER — LACTULOSE 10 G/15ML
15 SOLUTION ORAL DAILY
Qty: 690 ML | Refills: 2 | Status: SHIPPED | OUTPATIENT
Start: 2024-02-29 | End: 2024-03-30

## 2024-02-29 NOTE — PROGRESS NOTES
Clinic Follow Up:  [facility] Follow Up Note    Reason for Follow Up:  The primary encounter diagnosis was Chronic constipation. A diagnosis of Bloating was also pertinent to this visit.    PCP: Yeison Wilder       HPI:  This is a 83 y.o. male here for follow up of the above  Pt states that since his last visit, he has had no significant change in the constipation and bloating.   He has been taking the Amitiza BID but has also needed additional OTC medications to assist with BM  Continues with bloating.   Amitiza is costly and he is requesting something different due to cost and lack of improvement in symptoms.       Review of Systems   Constitutional:  Negative for chills, fever, malaise/fatigue and weight loss.   Respiratory:  Negative for cough.    Cardiovascular:  Negative for chest pain.   Gastrointestinal:         Per HPI   Musculoskeletal:  Negative for myalgias.   Skin:  Negative for itching and rash.   Neurological:  Negative for headaches.   Psychiatric/Behavioral:  The patient is not nervous/anxious.        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    Trouble in sleeping     Type 2 diabetes mellitus with diabetic polyneuropathy, without long-term current use of insulin     Urinary incontinence     Uses hearing aid     bilat       Surgical History:   Past Surgical History:   Procedure  Laterality Date    BACK SURGERY  2019    COLONOSCOPY N/A 6/30/2020    Procedure: COLONOSCOPY;  Surgeon: Joseph Iraheta MD;  Location: [facility] ENDO;  Service: Endoscopy;  Laterality: N/A;    ESOPHAGOGASTRODUODENOSCOPY N/A 6/30/2020    Procedure: EGD (ESOPHAGOGASTRODUODENOSCOPY);  Surgeon: Joseph Iraheta MD;  Location: [facility] ENDO;  Service: Endoscopy;  Laterality: N/A;    HERNIA REPAIR Bilateral 04/18/2017    INJECTION OF JOINT Right 4/14/2023    Procedure: right Synvisc Knee injection;  Surgeon: Hossein Brooks MD;  Location: [facility] PAIN MGT;  Service: Pain Management;  Laterality: Right;    JOINT REPLACEMENT Left 10/09/2017    L YAHIR Dr. Alcocer    LAMINECTOMY  07/22/2016    left elbow  10/2017    procedure to remove gout    lumbar foraminotomy  03/2018    REVISION TOTAL HIP ARTHROPLASTY Left 9/11/2018    Procedure: REVISION, TOTAL ARTHROPLASTY, HIP;  Surgeon: Albaro Alcocer Sr., MD;  Location: [facility] OR;  Service: Orthopedics;  Laterality: Left;    ROTATOR CUFF REPAIR Left 2006    SHOULDER ARTHROSCOPY W/ ROTATOR CUFF REPAIR Right 2009       Family History:   Family History   Problem Relation Age of Onset    Hypertension Mother     Heart disease Mother     Diabetes Mother     Hypertension Father     Heart disease Father     Diabetes Father     Stroke Father     Diabetes Sister     Cancer Brother 50        pancreas    Drug abuse Brother     Prostate cancer Neg Hx     Macular degeneration Neg Hx     Retinal detachment Neg Hx     Strabismus Neg Hx     Glaucoma Neg Hx     Blindness Neg Hx     Amblyopia Neg Hx     Kidney disease Neg Hx     Mental illness Neg Hx     Intellectual disability Neg Hx     COPD Neg Hx     Asthma Neg Hx        Social History:   Social History     Tobacco Use    Smoking status: Never     Passive exposure: Never    Smokeless tobacco: Never   Substance Use Topics    Alcohol use: Not Currently    Drug use: No       Allergies: Reviewed    Home Medications:  Current Outpatient Medications on  File Prior to Visit   Medication Sig Dispense Refill    allopurinoL (ZYLOPRIM) 100 MG tablet TAKE 1 TABLET EVERY DAY 90 tablet 3    bisacodyl (DULCOLAX) 10 mg Supp   0    blood glucose control, low Soln by Misc.(Non-Drug; Combo Route) route.      blood-glucose meter (TRUE METRIX AIR GLUCOSE METER) kit USE AS DIRECTED 1 each 0    cetirizine (ZYRTEC) 10 MG tablet Take 1 tablet (10 mg total) by mouth every evening. for 14 days 14 tablet 0    clonazePAM (KLONOPIN) 0.5 MG tablet TAKE 1 TABLET BY MOUTH EVERY EVENING 30 tablet 2    clotrimazole (LOTRIMIN) 1 % cream Apply topically 2 (two) times daily. for 10 days 60 g 0    cyanocobalamin, vitamin B-12, 1,000 mcg Subl Place 1,000 mcg under the tongue once daily. 90 tablet 3    diclofenac sodium (VOLTAREN) 1 % Gel Apply 2 g topically 4 (four) times daily as needed (pain). 200 g 2    GARLIC EXTRACT ORAL Take 1 tablet by mouth once daily. Per [facility]      JARDIANCE 10 mg tablet       LEXISCAN 0.4 mg/5 mL Syrg       losartan (COZAAR) 50 MG tablet Take 1 tablet (50 mg total) by mouth once daily. 90 tablet 3    lubiprostone (AMITIZA) 24 MCG Cap Take 1 capsule (24 mcg total) by mouth 2 (two) times daily with meals. 60 capsule 2    melatonin 10 mg Tab Take 1 tablet (10 mg total) by mouth every evening. 30 tablet 11    metFORMIN (GLUCOPHAGE) 1000 MG tablet TAKE 1 TABLET TWICE DAILY WITH MEALS 180 tablet 0    metoprolol succinate (TOPROL-XL) 50 MG 24 hr tablet TAKE 1 TABLET EVERY DAY 90 tablet 3    mometasone 0.1% (ELOCON) 0.1 % cream Apply topically once daily. 50 g 2    multivitamin (THERAGRAN) per tablet Take 1 tablet by mouth once daily.      pramipexole (MIRAPEX) 0.125 MG tablet TAKE 1 TABLET( 0.125 MG) BY MOUTH EVERY EVENING 30 tablet 11    pravastatin (PRAVACHOL) 40 MG tablet TAKE 1 TABLET EVERY DAY 90 tablet 2    pregabalin (LYRICA) 75 MG capsule Take 1-2 capsules ( mg total) by mouth every evening. 180 capsule 1    sildenafiL (VIAGRA) 100 MG tablet Take 1  "tablet (100 mg total) by mouth daily as needed for Erectile Dysfunction. 15 tablet 5    TRUE METRIX GLUCOSE TEST STRIP Strp USE AS DIRECTED TO CHECK SUGARS 300 strip 3    TRUEPLUS LANCETS 33 gauge Misc USE AS DIRECTED TO CHECK SUGARS 300 each 3     No current facility-administered medications on file prior to visit.       Physical Exam:  Vital Signs:  /64 (BP Location: Left arm, Patient Position: Sitting, BP Method: Large (Manual))   Pulse 80   Ht 6' 1" (1.854 m)   Wt 97.3 kg (214 lb 8.1 oz)   BMI 28.30 kg/m²   Body mass index is 28.3 kg/m².  Physical Exam  Vitals reviewed.   Constitutional:       Appearance: He is well-developed.   HENT:      Head: Normocephalic.   Eyes:      General: No scleral icterus.  Cardiovascular:      Rate and Rhythm: Normal rate.   Pulmonary:      Effort: Pulmonary effort is normal.   Abdominal:      General: There is no distension.      Tenderness: There is no abdominal tenderness.   Musculoskeletal:         General: Normal range of motion.      Cervical back: Normal range of motion.   Skin:     General: Skin is warm and dry.   Neurological:      Mental Status: He is alert and oriented to person, place, and time.         Labs: Pertinent labs reviewed.      Assessment:  1. Chronic constipation    2. Bloating        Recommendations:  Stable without new complaints, however, there has been no overall improvement in his symptoms.   Will stop Amitiza and start Lactulose up to BID  Increase water and dietary fiber  OK to continued OTC medications that are not stimulants.         Return to Clinic:  Follow up to be determined by results of above.        "

## 2024-03-01 ENCOUNTER — PROCEDURE VISIT (OUTPATIENT)
Dept: SPORTS MEDICINE | Facility: CLINIC | Age: 84
End: 2024-03-01
Payer: MEDICARE

## 2024-03-01 VITALS — WEIGHT: 214 LBS | HEIGHT: 73 IN | RESPIRATION RATE: 17 BRPM | BODY MASS INDEX: 28.36 KG/M2

## 2024-03-01 DIAGNOSIS — M17.11 PRIMARY OSTEOARTHRITIS OF RIGHT KNEE: Primary | ICD-10-CM

## 2024-03-01 PROCEDURE — 99499 UNLISTED E&M SERVICE: CPT | Mod: HCNC,S$GLB,, | Performed by: STUDENT IN AN ORGANIZED HEALTH CARE EDUCATION/TRAINING PROGRAM

## 2024-03-01 PROCEDURE — 20610 DRAIN/INJ JOINT/BURSA W/O US: CPT | Mod: HCNC,RT,S$GLB, | Performed by: STUDENT IN AN ORGANIZED HEALTH CARE EDUCATION/TRAINING PROGRAM

## 2024-03-01 NOTE — PROCEDURES
Large Joint Aspiration/Injection: R knee    Date/Time: 3/1/2024 10:30 AM    Performed by: Shan Winters MD  Authorized by: Shan Winters MD    Consent Done?:  Yes (Verbal)  Indications:  Pain and arthritis  Site marked: the procedure site was marked    Timeout: prior to procedure the correct patient, procedure, and site was verified    Prep: patient was prepped and draped in usual sterile fashion      Local anesthesia used?: Yes    Anesthesia:  Local infiltration  Local anesthetic:  Lidocaine 1% without epinephrine    Details:  Needle Size:  22 G  Ultrasonic Guidance for needle placement?: No    Approach:  Superior  Location:  Knee  Site:  R knee  Medications:  30 mg sodium hyaluronate (orthovisc) 30 mg/2 mL  Patient tolerance:  Patient tolerated the procedure well with no immediate complications

## 2024-03-12 DIAGNOSIS — G47.52 CONTROLLED REM SLEEP BEHAVIOR DISORDER: ICD-10-CM

## 2024-03-13 RX ORDER — ALLOPURINOL 100 MG/1
TABLET ORAL
Qty: 90 TABLET | Refills: 0 | Status: SHIPPED | OUTPATIENT
Start: 2024-03-13 | End: 2024-05-27

## 2024-03-13 RX ORDER — CLONAZEPAM 0.5 MG/1
0.5 TABLET ORAL NIGHTLY
Qty: 30 TABLET | Refills: 2 | OUTPATIENT
Start: 2024-03-13

## 2024-03-13 RX ORDER — PRAVASTATIN SODIUM 40 MG/1
TABLET ORAL
Qty: 90 TABLET | Refills: 1 | Status: SHIPPED | OUTPATIENT
Start: 2024-03-13

## 2024-03-13 NOTE — TELEPHONE ENCOUNTER
----- Message from Rashaad Vega sent at 3/13/2024 12:26 PM CDT -----  Contact: [place]  Type:  RX Refill Request    Who Called: [place]  Refill or New Rx:refill  RX Name and Strength:clonazePAM (KLONOPIN) 0.5 MG tablet  How is the patient currently taking it? (ex. 1XDay):1xday  Is this a 30 day or 90 day RX:90day  [facility]erred Pharm[facility] with phone number:  [id number] - [place], [place] - [address] [facility]  [address] [facility] [address]  Phone: [phone] Fax: [phone]  Local or Mail Order:local   Ordering Provider:    Would the patient rather a call back or a response via [facility]? Call back   Best Call Back Number: [phone]  Additional Information:     Thanks   Am

## 2024-03-13 NOTE — TELEPHONE ENCOUNTER
No care due was identified.  [facility] Embedded Care Due Messages. Reference number: [id number].   3/13/2024 2:56:21 AM CDT

## 2024-03-13 NOTE — TELEPHONE ENCOUNTER
Refill Routing Note   Medication(s) are not appropriate for processing by [facility] for the following reason(s):        Required labs outdated    [facility] action(s):  Defer  Approve               Appointments  past 12m or future 3m with PCP    Date Provider   Last Visit   12/18/2023 Yeison Wilder MD   Next Visit   Visit date not found Yeison Wilder MD   ED visits in past 90 days: 0        Note composed:1:11 PM 03/13/2024

## 2024-03-13 NOTE — TELEPHONE ENCOUNTER
Left vm informing pt that appt is needed before medication can be refilled. Left message with appt time and date and to return call to clinic if appt time and date does not work.

## 2024-03-20 ENCOUNTER — OFFICE VISIT (OUTPATIENT)
Dept: PULMONOLOGY | Facility: CLINIC | Age: 84
End: 2024-03-20
Payer: MEDICARE

## 2024-03-20 VITALS
SYSTOLIC BLOOD PRESSURE: 118 MMHG | HEIGHT: 73 IN | OXYGEN SATURATION: 99 % | RESPIRATION RATE: 17 BRPM | DIASTOLIC BLOOD PRESSURE: 66 MMHG | BODY MASS INDEX: 28.08 KG/M2 | HEART RATE: 78 BPM | WEIGHT: 211.88 LBS

## 2024-03-20 DIAGNOSIS — G47.52 CONTROLLED REM SLEEP BEHAVIOR DISORDER: Primary | ICD-10-CM

## 2024-03-20 DIAGNOSIS — G47.61 PLMD (PERIODIC LIMB MOVEMENT DISORDER): Chronic | ICD-10-CM

## 2024-03-20 DIAGNOSIS — R26.89 POOR BALANCE: ICD-10-CM

## 2024-03-20 DIAGNOSIS — E11.69 COMBINED HYPERLIPIDEMIA ASSOCIATED WITH TYPE 2 DIABETES MELLITUS: Chronic | ICD-10-CM

## 2024-03-20 DIAGNOSIS — E11.59 HYPERTENSION ASSOCIATED WITH DIABETES: ICD-10-CM

## 2024-03-20 DIAGNOSIS — I15.2 HYPERTENSION ASSOCIATED WITH DIABETES: ICD-10-CM

## 2024-03-20 DIAGNOSIS — R94.2 DIFFUSION CAPACITY OF LUNG (DL), DECREASED: ICD-10-CM

## 2024-03-20 DIAGNOSIS — E78.2 COMBINED HYPERLIPIDEMIA ASSOCIATED WITH TYPE 2 DIABETES MELLITUS: Chronic | ICD-10-CM

## 2024-03-20 PROCEDURE — 3288F FALL RISK ASSESSMENT DOCD: CPT | Mod: HCNC,CPTII,S$GLB, | Performed by: INTERNAL MEDICINE

## 2024-03-20 PROCEDURE — 1160F RVW MEDS BY RX/DR IN RCRD: CPT | Mod: HCNC,CPTII,S$GLB, | Performed by: INTERNAL MEDICINE

## 2024-03-20 PROCEDURE — 1101F PT FALLS ASSESS-DOCD LE1/YR: CPT | Mod: HCNC,CPTII,S$GLB, | Performed by: INTERNAL MEDICINE

## 2024-03-20 PROCEDURE — 3074F SYST BP LT 130 MM HG: CPT | Mod: HCNC,CPTII,S$GLB, | Performed by: INTERNAL MEDICINE

## 2024-03-20 PROCEDURE — 1159F MED LIST DOCD IN RCRD: CPT | Mod: HCNC,CPTII,S$GLB, | Performed by: INTERNAL MEDICINE

## 2024-03-20 PROCEDURE — 99214 OFFICE O/P EST MOD 30 MIN: CPT | Mod: HCNC,S$GLB,, | Performed by: INTERNAL MEDICINE

## 2024-03-20 PROCEDURE — 99999 PR PBB SHADOW E&M-EST. PATIENT-LVL V: CPT | Mod: PBBFAC,HCNC,, | Performed by: INTERNAL MEDICINE

## 2024-03-20 PROCEDURE — 3078F DIAST BP <80 MM HG: CPT | Mod: HCNC,CPTII,S$GLB, | Performed by: INTERNAL MEDICINE

## 2024-03-20 RX ORDER — LACTULOSE 10 G/15ML
SOLUTION ORAL; RECTAL
COMMUNITY
Start: 2024-02-29

## 2024-03-20 RX ORDER — PRAMIPEXOLE DIHYDROCHLORIDE 0.12 MG/1
TABLET ORAL
Qty: 30 TABLET | Refills: 11 | Status: SHIPPED | OUTPATIENT
Start: 2024-03-20 | End: 2024-05-09

## 2024-03-20 RX ORDER — CLONAZEPAM 0.5 MG/1
1.5 TABLET ORAL NIGHTLY
Qty: 270 TABLET | Refills: 0 | Status: SHIPPED | OUTPATIENT
Start: 2024-03-20 | End: 2024-06-18

## 2024-03-20 RX ORDER — ACETAMINOPHEN, DIPHENHYDRAMINE HCL, PHENYLEPHRINE HCL 325; 25; 5 MG/1; MG/1; MG/1
15 TABLET ORAL NIGHTLY
Qty: 45 TABLET | Refills: 11 | Status: SHIPPED | OUTPATIENT
Start: 2024-03-20 | End: 2025-03-20

## 2024-03-20 NOTE — PROGRESS NOTES
Subjective:       Srinath Mcknight is a 83 y.o. male   Last visit was 07/26/2018  Has been doing overall well.  [place] score 5. Recent revision hip Left  His major sleep issues a REM behavior disorder, PLMD and obstructive sleep apnea  With regard to obstructive sleep apnea and this is borderline patient has been reluctant to use CPAP in past.  REM behavioral events have been very infrequent less than once or twice in the month  He is stable on a regimen of clonidine 0.5 mg.  And melatonin   He is not taking Mirapex for his periodic limb movement  No cough no wheezing or shortness of breath  I have reviewed the patient's medical history in detail and updated the computerized patient record.    01/05/2022  Follow up visit to review tersts  C/o easily fatigue, DAMON   See cardiology , -ve w/u  Seen Dr Zimmer  ABG: Compensated resp alkalosis  6MWD:Reduced exercise capacity, 32.42%, Dyspnea wa grade 3, SpO2 100%, Peak BP and HR was 103 BPM, /64  PFT: Normal Spirometry:Mild restriction: DLCO mild reduced  Bed time:1130pm  Wake time 9 am  Appear to have stopped Klonopin and Melatonin while in hospital, says wife observed sleep reenactment activity    06/15/2022:  Last visit was 01/05/2022  Interested in Pul rehab: did not qualify last testing  No cough, No wheezing  SOB: frustrating after hip replacement and back surgery  PFT: low MVV and restriction  Not on Oxygen  Has GAYLA inhaler, not sure  regardinfg sleep issues  Stable on Klonopin and Melatonin  No reenactment    07/29/2022  Reviewed PFT and Walk  Improved from 32.4% to 44.21%  SNIFF was normal  [place] 4  Accepted to pul rehab  PFT: restriction with reduced DLCO    02/08/2023  Last visit 07/29/2022  Snoring when sleep on backEpworth 7  Bed time 11 pm, wake time 11am  Taking Melatonin  No sleep related movements  Klonopin + Melatonin  No SOB,   [place] 7  Has not walked out of bed recently  PSG 2017 reveiwed  Reluctant to wear CPAP in past   BMI increased  from 27.9 to 28  Increased snoring     03/30/2023  Last visit  Here to review PSG  AHI was 2.4/hr  Sleep talking was observed x 1  No RBD  .0/hr with 50 associated with arousal  Added Mirapex  No CHARLES   Snoring  with SpO 2 90%     07/06/2023  Followup  [place] score 7  Bed time: 11: 30 pm  Wake time 10:30 am  RBD events in early Am 5-6 am, wake out of bed  Occurs 2-3 nights  Adherent with Mirapex, increased dose melatoni  Snoring at night sleeping on back and left side  Phamacy alerted him about risk of benzo in elderly  At this point in time caanot safely DC klonopin  Will increase melatonin to target max dose 20 mg  Discussed with patient medical neccessity and advised spouse to use spare bedroom( reluctant)  No ETOH    03/20/2024  Followup  Here alone, drove to appt  Occasionas of dream enactment fewer   However will murmur in bed  Klonopin 1.0 mg and melation 10 mg  Will increase to Klonopin 1.5 mg and melatoni 15 mg  Has chronic issues wityh balance, using cane  Has not fallen  Wants to see neurology  Mirapex: adherence poor: refill sent      Previous Report(s) Reviewed: historical medical records and office notes     The following portions of the patient's history were reviewed and updated as appropriate:   He  has a past medical history of Anemia due to unknown mechanism (11/10/2015), Angina pectoris (2014), Arthritis, Back pain, Coronary artery disease, Diabetes mellitus with stage 3 chronic kidney disease (11/18/2020), DM (diabetes mellitus) (25-30 years), DM (diabetes mellitus), Encounter for blood transfusion, Gout (12/05/2017), History of blood transfusion, Hyperlipemia, Hypertension, CHARLES (obstructive sleep apnea), Polyneuropathy, Spinal cord disease, 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  He does not have any pertinent  problems on file.  He  has a past surgical history that includes Rotator cuff repair (Left, 2006); Shoulder arthroscopy w/ rotator cuff repair (Right, 2009); Laminectomy (07/22/2016); Hernia repair (Bilateral, 04/18/2017); Joint replacement (Left, 10/09/2017); Back surgery (2019); lumbar foraminotomy (03/2018); left elbow (10/2017); Revision total hip arthroplasty (Left, 9/11/2018); Esophagogastroduodenoscopy (N/A, 6/30/2020); Colonoscopy (N/A, 6/30/2020); and Injection of joint (Right, 4/14/2023).  His family history includes Cancer (age of onset: 50) in his brother; Diabetes in his father, mother, and sister; Drug abuse in his brother; Heart disease in his father and mother; Hypertension in his father and mother; Stroke in his father.  He  reports that he has never smoked. He has never been exposed to tobacco smoke. He has never used smokeless tobacco. He reports that he does not currently use alcohol. He reports that he does not use drugs.  He has a current medication list which includes the following prescription(s): allopurinol, bisacodyl, blood glucose control, low, true metrix air glucose meter, cyanocobalamin (vitamin b-12), diclofenac sodium, garlic extract, jardiance, lactulose, lactulose, lexiscan, losartan, lubiprostone, metformin, metoprolol succinate, mometasone 0.1%, multivitamin, pravastatin, pregabalin, sildenafil, true metrix glucose test strip, trueplus lancets, cetirizine, clonazepam, clotrimazole, melatonin, and pramipexole.  Current Outpatient Medications on File Prior to Visit   Medication Sig Dispense Refill    allopurinoL (ZYLOPRIM) 100 MG tablet TAKE 1 TABLET EVERY DAY 90 tablet 0    bisacodyl (DULCOLAX) 10 mg Supp   0    blood glucose control, low Soln by Misc.(Non-Drug; Combo Route) route.      blood-glucose meter (TRUE METRIX AIR GLUCOSE METER) kit USE AS DIRECTED 1 each 0    cyanocobalamin, vitamin B-12, 1,000 mcg Subl Place 1,000 mcg under the tongue once daily. 90 tablet 3     diclofenac sodium (VOLTAREN) 1 % Gel Apply 2 g topically 4 (four) times daily as needed (pain). 200 g 2    GARLIC EXTRACT ORAL Take 1 tablet by mouth once daily. Per [facility]      JARDIANCE 10 mg tablet       lactulose (CHRONULAC) 10 gram/15 mL solution SMARTSI Milliliter(s) By Mouth Daily      lactulose (CHRONULAC) 20 gram/30 mL Soln Take 23 mLs (15 g total) by mouth once daily. 690 mL 02    LEXISCAN 0.4 mg/5 mL Syrg       losartan (COZAAR) 50 MG tablet Take 1 tablet (50 mg total) by mouth once daily. 90 tablet 3    lubiprostone (AMITIZA) 24 MCG Cap Take 1 capsule (24 mcg total) by mouth 2 (two) times daily with meals. 60 capsule 2    metFORMIN (GLUCOPHAGE) 1000 MG tablet TAKE 1 TABLET TWICE DAILY WITH MEALS 180 tablet 0    metoprolol succinate (TOPROL-XL) 50 MG 24 hr tablet TAKE 1 TABLET EVERY DAY 90 tablet 3    mometasone 0.1% (ELOCON) 0.1 % cream Apply topically once daily. 50 g 2    multivitamin (THERAGRAN) per tablet Take 1 tablet by mouth once daily.      pravastatin (PRAVACHOL) 40 MG tablet TAKE 1 TABLET EVERY DAY 90 tablet 1    pregabalin (LYRICA) 75 MG capsule Take 1-2 capsules ( mg total) by mouth every evening. 180 capsule 1    sildenafiL (VIAGRA) 100 MG tablet Take 1 tablet (100 mg total) by mouth daily as needed for Erectile Dysfunction. 15 tablet 5    TRUE METRIX GLUCOSE TEST STRIP Strp USE AS DIRECTED TO CHECK SUGARS 300 strip 3    TRUEPLUS LANCETS 33 gauge Misc USE AS DIRECTED TO CHECK SUGARS 300 each 3    [DISCONTINUED] clonazePAM (KLONOPIN) 0.5 MG tablet TAKE 1 TABLET BY MOUTH EVERY EVENING 30 tablet 2    [DISCONTINUED] melatonin 10 mg Tab Take 1 tablet (10 mg total) by mouth every evening. 30 tablet 11    [DISCONTINUED] pramipexole (MIRAPEX) 0.125 MG tablet TAKE 1 TABLET( 0.125 MG) BY MOUTH EVERY EVENING 30 tablet 11    cetirizine (ZYRTEC) 10 MG tablet Take 1 tablet (10 mg total) by mouth every evening. for 14 days 14 tablet 0    clotrimazole (LOTRIMIN) 1 % cream Apply  "topically 2 (two) times daily. for 10 days 60 g 0     No current facility-administered medications on file prior to visit.     He is allergic to sulfa (sulfonamide antibiotics)..     Review of Systems   Constitutional:  Positive for malaise/fatigue.   Eyes: Negative.    Respiratory:          Snoring   Cardiovascular: Negative.    Genitourinary: Negative.    Musculoskeletal: Negative.    Neurological: Negative.    Endo/Heme/Allergies: Negative.    Psychiatric/Behavioral:  Positive for memory loss.    All other systems reviewed and are negative.        Objective:      Vitals:    03/20/24 0929   BP: 118/66   Pulse: 78   Resp: 17   SpO2: 99%   Weight: 96.1 kg (211 lb 13.8 oz)   Height: 6' 1" (1.854 m)             Using cane    Physical Exam  Vitals and nursing note reviewed.   Constitutional:       Appearance: He is well-developed.   HENT:      Head: Normocephalic and atraumatic.      Nose: Nose normal.   Eyes:      General:         Left eye: No discharge.      Pupils: Pupils are equal, round, and reactive to light.   Neck:      Vascular: No JVD.   Cardiovascular:      Rate and Rhythm: Normal rate and regular rhythm.      Heart sounds: Normal heart sounds. No murmur heard.  Pulmonary:      Effort: Pulmonary effort is normal. No respiratory distress.   Abdominal:      General: Bowel sounds are normal.      Palpations: Abdomen is soft.   Musculoskeletal:         General: No deformity. Normal range of motion.      Cervical back: Normal range of motion and neck supple.   Skin:     General: Skin is warm and dry.   Neurological:      Mental Status: He is alert and oriented to person, place, and time.      Deep Tendon Reflexes: Reflexes are normal and symmetric.       PATIENT: Srinath Mcknight  STUDY DATE: 3/6/2023  REFERRING PHYSICIAN: Martin Machuca MD     INDICATIONS FOR POLYSOMNOGRAPHY: The patient is a 82 year year old Male who is 6' 3" and weighs 216.0 lbs.  His BMI equals 27.1.  A full night polysomnogram was " performed to evaluate for -. [place] 7.Known CHARLES, reluctant t wear CPAP. RBD on Klonopin and melatonin. AHI 6.1/hr 38 events.     POLYSOMNOGRAM DATA:  A full night polysomnogram recorded the standard physiologic parameters including EEG, EOG, EMG, EKG, nasal and oral airflow.  Respiratory parameters of chest and abdominal movements were recorded with [place]-Crystal motion transducers.  Oxygen saturation was recorded by pulse oximetry.       SLEEP ARCHITECTURE:  The total recording time of the polysomnogram was 416.0 minutes.  The total sleep time was 369.5 minutes.  The patient spent 8.5% of total sleep time in Stage N1, 80.8% in Stage N2, 0.0% in Stages N3 and N, and 10.7% in REM.   Sleep latency was 12.9 minutes.  REM latency was 100.5 minutes.  Sleep Efficiency was 88.8%.  Sleep Maintenance Efficiency was 90.9%.  Total wake time was 47.0 minutes for a total wake percentage of 8.4%.     RESPIRATORY EVENTS:  The polysomnogram revealed a presence of 2 obstructive, 3 central, and - mixed apneas resulting in an Apnea index of 0.8 events per hour.  There were 10 hypopneas (using 3% desaturation criteria) resulting in a Hypopnea index of 1.6 events per hour.  The combined Apnea/Hypopnea index (using 3% desaturation criteria for Hypopneas) was 2.4 events per hour.     Baseline oxygen saturation was 93.8%.  The lowest oxygen saturation was 90.0%.       LIMB ACTIVITY:  There were 665 limb movements recorded.  Of this total, 665 were classified as PLMs.  Of the PLMs, 50 were associated with arousals.  The Limb Movement index was 108.0 per hour while the PLM index was 108.0 per hour.     CARDIAC SUMMARY:   The average pulse rate was 63.8 bpm.  The minimum pulse rate was 36.0 bpm while the maximum pulse rate was 79.0 bpm.               CONCLUSION:  Overall AHI was 2.4/hr  SpO2 lamine 90%  Sleep talking was observed   Limb movements: 665. 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   Continue current regime: Melatonin and Klonopin+Mirapex  Discussed with patient given his aage need to balance the need for the night time meds with understanding benzodiapines may leave him with resudual drowsiness and risk of falls  Closely monitor    Requested Prescriptions     Signed Prescriptions Disp Refills    clonazePAM (KLONOPIN) 0.5 MG tablet 270 tablet 0     Sig: Take 3 tablets (1.5 mg total) by mouth every evening.    melatonin 10 mg Tab 45 tablet 11     Sig: Take 1.5 tablets (15 mg total) by mouth every evening.    pramipexole (MIRAPEX) 0.125 MG tablet 30 tablet 11     Sig: TAKE 1 TABLET( 0.125 MG) BY MOUTH EVERY EVENING         Requested Prescriptions     Signed Prescriptions Disp Refills    clonazePAM (KLONOPIN) 0.5 MG tablet 270 tablet 0     Sig: Take 3 tablets (1.5 mg total) by mouth every evening.    melatonin 10 mg Tab 45 tablet 11     Sig: Take 1.5 tablets (15 mg total) by mouth every evening.    pramipexole (MIRAPEX) 0.125 MG tablet 30 tablet 11     Sig: TAKE 1 TABLET( 0.125 MG) BY MOUTH EVERY EVENING         Follow up in about 3 months (around 6/20/2024), or sleep hygiene, dose adjusted, medication adherence, ref neurology balance issues,  needs RSV.    This note was prepared using voice recognition system and is likely to have sound alike errors that may have been overlooked even after proof reading.  Please call me with any questions    Discussed diagnosis, its evaluation, treatment and usual course. All questions answered.    Thank you for the courtesy of participating in the care of this patient    Martin Machuca MD

## 2024-04-03 ENCOUNTER — PATIENT MESSAGE (OUTPATIENT)
Dept: PULMONOLOGY | Facility: CLINIC | Age: 84
End: 2024-04-03
Payer: MEDICARE

## 2024-04-08 NOTE — LETTER
Goal Outcome Evaluation:   Patient admitted to ICU this morning, restless, CIWA 15 not protecting airway. Intubated and sedated. Family updated. Currently sedated on prop and fentanyl.                                             November 13, 2017      Yeison Wilder MD  [address]  [address]  [place] [address]           [facility]  [address] [place] [id number]  Phone: [phone]  Fax: [phone]          Patient: Srinath Mcknight   MR Number: [id number]   YOB: 1940   Date of Visit: 11/13/2017       Dear Dr. Yeison Wilder:    Thank you for referring Srinath Mcknight to me for evaluation. Attached you will find relevant portions of my assessment and plan of care.    If you have questions, please do not hesitate to call me. I look forward to following Srinath Mcknight along with you.    Sincerely,    Chauncey Amador IV, MD    Enclosure  CC:  No Recipients    If you would like to receive this communication electronically, please contact [email or web address] or [phone] to request more information on [employer] access.    For providers and/or their staff who would like to refer a patient to [facility], please contact us through our one-stop-shop provider referral line, [facility], at [phone].    If you feel you have received this communication in error or would no longer like to receive these types of communications, please e-mail [email or web address]

## 2024-04-12 ENCOUNTER — OFFICE VISIT (OUTPATIENT)
Dept: URGENT CARE | Facility: CLINIC | Age: 84
End: 2024-04-12
Payer: MEDICARE

## 2024-04-12 VITALS
HEART RATE: 80 BPM | OXYGEN SATURATION: 98 % | SYSTOLIC BLOOD PRESSURE: 136 MMHG | WEIGHT: 211.88 LBS | DIASTOLIC BLOOD PRESSURE: 72 MMHG | BODY MASS INDEX: 28.08 KG/M2 | HEIGHT: 73 IN | TEMPERATURE: 98 F | RESPIRATION RATE: 15 BRPM

## 2024-04-12 DIAGNOSIS — R35.0 URINARY FREQUENCY: Primary | ICD-10-CM

## 2024-04-12 LAB
BILIRUB UR QL STRIP: NEGATIVE
COLOR UR: YELLOW
GLUCOSE UR QL STRIP: NEGATIVE
KETONES UR QL STRIP: NEGATIVE
LEUKOCYTE ESTERASE UR QL STRIP: NEGATIVE
PH, POC UA: 5.5
POC BLOOD, URINE: NEGATIVE
POC NITRATES, URINE: NEGATIVE
PROT UR QL STRIP: NEGATIVE
SP GR UR STRIP: 1.01 (ref 1–1.03)
UROBILINOGEN UR STRIP-ACNC: NORMAL (ref 0.3–2.2)

## 2024-04-12 PROCEDURE — 87186 SC STD MICRODIL/AGAR DIL: CPT | Performed by: PHYSICIAN ASSISTANT

## 2024-04-12 PROCEDURE — 87077 CULTURE AEROBIC IDENTIFY: CPT | Performed by: PHYSICIAN ASSISTANT

## 2024-04-12 PROCEDURE — 99213 OFFICE O/P EST LOW 20 MIN: CPT | Mod: S$GLB,,, | Performed by: PHYSICIAN ASSISTANT

## 2024-04-12 PROCEDURE — 87088 URINE BACTERIA CULTURE: CPT | Performed by: PHYSICIAN ASSISTANT

## 2024-04-12 PROCEDURE — 81003 URINALYSIS AUTO W/O SCOPE: CPT | Mod: QW,S$GLB,, | Performed by: PHYSICIAN ASSISTANT

## 2024-04-12 PROCEDURE — 87086 URINE CULTURE/COLONY COUNT: CPT | Mod: HCNC | Performed by: PHYSICIAN ASSISTANT

## 2024-04-12 RX ORDER — OXYBUTYNIN CHLORIDE 5 MG/1
5 TABLET ORAL 3 TIMES DAILY
Qty: 30 TABLET | Refills: 0 | Status: SHIPPED | OUTPATIENT
Start: 2024-04-12 | End: 2024-06-03

## 2024-04-12 NOTE — PROGRESS NOTES
"Subjective:      Patient ID: Srinath Mcknight is a 83 y.o. male.    Vitals:  height is 6' 0.99" (1.854 m) and weight is 96.1 kg (211 lb 13.8 oz). His oral temperature is 97.7 °F (36.5 °C). His blood pressure is 136/72 and his pulse is 80. His respiration is 15 and oxygen saturation is 98%.     Chief Complaint: Urinary Frequency    Pt is 84 y/o male with hx of diabetes, BPH, ED, renal cyst and HTN who presents here today with urinary frequency that started last night. Gradually getting worse. He states he constantly was getting out of bed to urinate. Has urgency upon standing. Denies dysuria, hematuria, flank pain, penile discharge, fever/chills. He reports his blood sugar has been well controlled. Had UTI in December.  He established with Urology.  Had appointment in November and follows up annually.  No treatments tried.    Urinary Frequency   This is a new problem. The current episode started today. The problem has been gradually worsening. The pain is at a severity of 0/10. The patient is experiencing no pain. There has been no fever. The fever has been present for Less than 1 day. He is Not sexually active. There is No history of pyelonephritis. Associated symptoms include frequency and urgency. Pertinent negatives include no behavior changes, chills, discharge, flank pain, hematuria, hesitancy, nausea, possible pregnancy, sweats, vomiting, weight loss, bubble bath use, constipation, rash or withholding. He has tried nothing for the symptoms. His past medical history is significant for diabetes mellitus and hypertension. There is no history of catheterization, diabetes insipidus, genitourinary reflux, kidney stones, recurrent UTIs, a single kidney, STD, urinary stasis or a urological procedure.       Constitution: Negative for chills, sweating and fever.   Gastrointestinal:  Negative for abdominal pain, nausea, vomiting and constipation.   Genitourinary:  Positive for frequency and urgency. Negative for dysuria, " flank pain, hematuria, history of kidney stones, penile discharge, penile pain, penile swelling and testicular pain.   Musculoskeletal: Negative.    Skin:  Negative for rash.      Objective:     Physical Exam   Constitutional: He appears well-developed.  Non-toxic appearance. He does not appear ill. No distress.   HENT:   Head: Normocephalic and atraumatic.   Ears:   Right Ear: External ear normal.   Left Ear: External ear normal.   Nose: Nose normal.   Eyes: Conjunctivae and EOM are normal.   Neck: Neck supple.   Pulmonary/Chest: Effort normal and breath sounds normal.   Abdominal: Normal appearance. He exhibits no distension. Soft. flat abdomen There is no abdominal tenderness. There is no guarding.   Musculoskeletal: Normal range of motion.         General: Normal range of motion.   Neurological: no focal deficit. He is alert and at baseline. He displays no weakness.   Skin: Skin is warm, dry, not diaphoretic, not pale and no rash.   Psychiatric: His behavior is normal. Mood and thought content normal.     Results for orders placed or performed in visit on 04/12/24   POCT Urinalysis, Dipstick, Automated, W/O Scope   Result Value Ref Range    POC Blood, Urine Negative Negative    POC Bilirubin, Urine Negative Negative    POC Urobilinogen, Urine Normal 0.3 - 2.2    POC Ketones, Urine Negative Negative    POC Protein, Urine Negative Negative    POC Nitrates, Urine Negative Negative    POC Glucose, Urine Negative Negative    pH, UA 5.5     POC Specific Gravity, Urine 1.015 1.003 - 1.029    POC Leukocytes, Urine Negative Negative    Color, UA Yellow Light Yellow, Yellow     *Note: Due to a large number of results and/or encounters for the requested time period, some results have not been displayed. A complete set of results can be found in Results Review.         Assessment:     1. Urinary frequency        Plan:     UA normal in clinic today. Will send urine culture to r/o infection. VSS. He is not having any pain or  hematuria. Continue to monitor blood glucose at home. He has been on oxybutynin in the past. Will start on this while waiting for urine culture. If no evidence of infection on culture then recommend he f/u with his PCP vs. Urologist for further evaluation.   Urinary frequency  -     POCT Urinalysis, Dipstick, Automated, W/O Scope  -     Urine culture  -     oxybutynin (DITROPAN) 5 MG Tab; Take 1 tablet (5 mg total) by mouth 3 (three) times daily.  Dispense: 30 tablet; Refill: 0

## 2024-04-17 ENCOUNTER — TELEPHONE (OUTPATIENT)
Dept: URGENT CARE | Facility: CLINIC | Age: 84
End: 2024-04-17

## 2024-04-17 DIAGNOSIS — N30.00 ACUTE CYSTITIS WITHOUT HEMATURIA: Primary | ICD-10-CM

## 2024-04-17 LAB — BACTERIA UR CULT: ABNORMAL

## 2024-04-17 RX ORDER — CIPROFLOXACIN 250 MG/1
250 TABLET, FILM COATED ORAL 2 TIMES DAILY
Qty: 14 TABLET | Refills: 0 | Status: SHIPPED | OUTPATIENT
Start: 2024-04-17 | End: 2024-04-26 | Stop reason: ALTCHOICE

## 2024-04-17 NOTE — TELEPHONE ENCOUNTER
Informed patient of positive urine culture results.  He is allergic to sulfa.  Will start him on ciprofloxacin.  Was advised to finish full course of antibiotics and follow-up if symptoms do not fully resolve.  Patient voiced understanding.

## 2024-04-25 ENCOUNTER — OFFICE VISIT (OUTPATIENT)
Dept: INTERNAL MEDICINE | Facility: CLINIC | Age: 84
End: 2024-04-25
Payer: MEDICARE

## 2024-04-25 ENCOUNTER — TELEPHONE (OUTPATIENT)
Dept: PAIN MEDICINE | Facility: CLINIC | Age: 84
End: 2024-04-25

## 2024-04-25 ENCOUNTER — LAB VISIT (OUTPATIENT)
Dept: LAB | Facility: HOSPITAL | Age: 84
End: 2024-04-25
Attending: NURSE PRACTITIONER
Payer: MEDICARE

## 2024-04-25 VITALS
TEMPERATURE: 96 F | HEIGHT: 72 IN | HEART RATE: 77 BPM | SYSTOLIC BLOOD PRESSURE: 138 MMHG | OXYGEN SATURATION: 99 % | BODY MASS INDEX: 30.13 KG/M2 | DIASTOLIC BLOOD PRESSURE: 84 MMHG | WEIGHT: 222.44 LBS

## 2024-04-25 DIAGNOSIS — R26.81 GAIT INSTABILITY: ICD-10-CM

## 2024-04-25 DIAGNOSIS — I70.0 ATHEROSCLEROSIS OF AORTA: ICD-10-CM

## 2024-04-25 DIAGNOSIS — E11.59 HYPERTENSION ASSOCIATED WITH DIABETES: ICD-10-CM

## 2024-04-25 DIAGNOSIS — E11.69 COMBINED HYPERLIPIDEMIA ASSOCIATED WITH TYPE 2 DIABETES MELLITUS: Chronic | ICD-10-CM

## 2024-04-25 DIAGNOSIS — M47.26 OSTEOARTHRITIS OF SPINE WITH RADICULOPATHY, LUMBAR REGION: Chronic | ICD-10-CM

## 2024-04-25 DIAGNOSIS — E11.42 TYPE 2 DIABETES MELLITUS WITH DIABETIC POLYNEUROPATHY, WITHOUT LONG-TERM CURRENT USE OF INSULIN: ICD-10-CM

## 2024-04-25 DIAGNOSIS — N18.31 STAGE 3A CHRONIC KIDNEY DISEASE: ICD-10-CM

## 2024-04-25 DIAGNOSIS — E11.69 COMBINED HYPERLIPIDEMIA ASSOCIATED WITH TYPE 2 DIABETES MELLITUS: ICD-10-CM

## 2024-04-25 DIAGNOSIS — D57.3 SICKLE CELL TRAIT: ICD-10-CM

## 2024-04-25 DIAGNOSIS — E78.2 COMBINED HYPERLIPIDEMIA ASSOCIATED WITH TYPE 2 DIABETES MELLITUS: ICD-10-CM

## 2024-04-25 DIAGNOSIS — E78.2 COMBINED HYPERLIPIDEMIA ASSOCIATED WITH TYPE 2 DIABETES MELLITUS: Chronic | ICD-10-CM

## 2024-04-25 DIAGNOSIS — E11.42 TYPE 2 DIABETES MELLITUS WITH DIABETIC POLYNEUROPATHY, WITHOUT LONG-TERM CURRENT USE OF INSULIN: Primary | ICD-10-CM

## 2024-04-25 DIAGNOSIS — I70.0 AORTIC CALCIFICATION: Primary | Chronic | ICD-10-CM

## 2024-04-25 DIAGNOSIS — I15.2 HYPERTENSION ASSOCIATED WITH DIABETES: ICD-10-CM

## 2024-04-25 DIAGNOSIS — E11.3311 TYPE 2 DIABETES MELLITUS WITH MODERATE NONPROLIFERATIVE RETINOPATHY OF RIGHT EYE AND MACULAR EDEMA, UNSPECIFIED WHETHER LONG TERM INSULIN USE: ICD-10-CM

## 2024-04-25 DIAGNOSIS — R35.0 URINARY FREQUENCY: ICD-10-CM

## 2024-04-25 LAB
ESTIMATED AVG GLUCOSE: 137 MG/DL (ref 68–131)
HBA1C MFR BLD: 6.4 % (ref 4–5.6)

## 2024-04-25 PROCEDURE — 1101F PT FALLS ASSESS-DOCD LE1/YR: CPT | Mod: CPTII,S$GLB,, | Performed by: NURSE PRACTITIONER

## 2024-04-25 PROCEDURE — 1160F RVW MEDS BY RX/DR IN RCRD: CPT | Mod: CPTII,S$GLB,, | Performed by: NURSE PRACTITIONER

## 2024-04-25 PROCEDURE — 2023F DILAT RTA XM W/O RTNOPTHY: CPT | Mod: CPTII,S$GLB,, | Performed by: NURSE PRACTITIONER

## 2024-04-25 PROCEDURE — 3079F DIAST BP 80-89 MM HG: CPT | Mod: CPTII,S$GLB,, | Performed by: NURSE PRACTITIONER

## 2024-04-25 PROCEDURE — 36415 COLL VENOUS BLD VENIPUNCTURE: CPT | Mod: PO | Performed by: NURSE PRACTITIONER

## 2024-04-25 PROCEDURE — 99999 PR PBB SHADOW E&M-EST. PATIENT-LVL IV: CPT | Mod: PBBFAC,,, | Performed by: NURSE PRACTITIONER

## 2024-04-25 PROCEDURE — 3288F FALL RISK ASSESSMENT DOCD: CPT | Mod: CPTII,S$GLB,, | Performed by: NURSE PRACTITIONER

## 2024-04-25 PROCEDURE — 1159F MED LIST DOCD IN RCRD: CPT | Mod: CPTII,S$GLB,, | Performed by: NURSE PRACTITIONER

## 2024-04-25 PROCEDURE — 1126F AMNT PAIN NOTED NONE PRSNT: CPT | Mod: CPTII,S$GLB,, | Performed by: NURSE PRACTITIONER

## 2024-04-25 PROCEDURE — 83036 HEMOGLOBIN GLYCOSYLATED A1C: CPT | Performed by: NURSE PRACTITIONER

## 2024-04-25 PROCEDURE — 99214 OFFICE O/P EST MOD 30 MIN: CPT | Mod: S$GLB,,, | Performed by: NURSE PRACTITIONER

## 2024-04-25 PROCEDURE — 3075F SYST BP GE 130 - 139MM HG: CPT | Mod: CPTII,S$GLB,, | Performed by: NURSE PRACTITIONER

## 2024-04-25 NOTE — PROGRESS NOTES
"Subjective:       Patient ID: Srinath Mcknight is a 83 y.o. male.    Chief Complaint: Follow-up    Pt presents to clinic today for follow up  Pt with hx of type 2 diabetes with neuropathy, retinopathy and vascular issues.    He also has hypertension, hyperlipidemia chronic constipation.      Today he complains of some issues with his balance  He feels "off balance" all the time  Has been referred to neurology- next appt is in Nov  Some days has good balance, other days he worries he is going to fall  He had had 1 fall in the past 6 months  Using cane for ambulation    Pt also dealing with some chronic lower back pain, right hip pain, and knee pain  Feels like nothing he is doing is helping him  He sees ortho and pain management but still with a lot of pain  Lyrica does not really help    He is also having problems with urinary frequency and sometimes incontinence  Went to UC last week and dx with UTI  Feels like his symptoms have improved however still bothersome to him           /84   Pulse 77   Temp 96.3 °F (35.7 °C) (Tympanic)   Ht 6' (1.829 m)   Wt 100.9 kg (222 lb 7.1 oz)   SpO2 99%   BMI 30.17 kg/m²     Review of Systems   Constitutional:  Negative for activity change, appetite change, chills, diaphoresis, fatigue, fever and unexpected weight change.   HENT: Negative.     Eyes: Negative.    Respiratory:  Negative for apnea, chest tightness, shortness of breath and stridor.    Cardiovascular:  Negative for chest pain, palpitations and leg swelling.   Gastrointestinal: Negative.    Endocrine: Negative.    Genitourinary: Negative.    Musculoskeletal:  Positive for arthralgias, back pain, gait problem and myalgias.   Skin:  Negative for color change, pallor, rash and wound.   Allergic/Immunologic: Negative.    Neurological:  Positive for weakness. Negative for dizziness, facial asymmetry, light-headedness and headaches.   Hematological:  Negative for adenopathy.   Psychiatric/Behavioral:  Negative for " agitation and behavioral problems.        Objective:      Physical Exam  Vitals and nursing note reviewed.   Constitutional:       General: He is not in acute distress.     Appearance: He is well-developed. He is not diaphoretic.   HENT:      Head: Normocephalic and atraumatic.   Cardiovascular:      Rate and Rhythm: Normal rate and regular rhythm.      Heart sounds: Normal heart sounds.   Pulmonary:      Effort: Pulmonary effort is normal. No respiratory distress.      Breath sounds: Normal breath sounds.   Musculoskeletal:      Thoracic back: Spasms present. Decreased range of motion.      Lumbar back: Spasms present. Decreased range of motion.      Right hip: No crepitus. Decreased range of motion. Decreased strength.      Right knee: Decreased range of motion.   Skin:     General: Skin is warm and dry.      Findings: No rash.   Neurological:      Mental Status: He is alert and oriented to person, place, and time.   Psychiatric:         Behavior: Behavior normal.         Thought Content: Thought content normal.         Judgment: Judgment normal.         Assessment:       1. Type 2 diabetes mellitus with diabetic polyneuropathy, without long-term current use of insulin    2. 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 Hypertension associated with diabetes    4. Combined hyperlipidemia associated with type 2 diabetes mellitus    5. Gait instability    6. Osteoarthritis of spine with radiculopathy, lumbar region    7. Urinary frequency    8. Stage 3a chronic kidney disease    9. Atherosclerosis of aorta    10. Sickle cell trait        Plan:       [place] was seen today for follow-up.    Diagnoses and all orders for this visit:    Type 2 diabetes mellitus with diabetic polyneuropathy, without long-term current use of insulin  -     Hemoglobin A1C; Future  Type 2 diabetes mellitus with moderate nonproliferative  retinopathy of right eye and macular edema, unspecified whether long term insulin use       - Chronic, well controlled on last A1C, will update today   Hypertension associated with diabetes    - Chronic, stable on current meds. Continue to follow with cardiology   Combined hyperlipidemia associated with type 2 diabetes mellitus     - Chronic, stable   Gait instability  -     Ambulatory referral/consult to Physical/Occupational Therapy; Future  - Will refer to PT, keep neurology appt. Fall precautions discussed     Osteoarthritis of spine with radiculopathy, lumbar region  -     Ambulatory referral/consult to [facility]; Future    Urinary frequency     - Follow up with your urology team  Stage 3a chronic kidney disease     - Chronic, stable   Atherosclerosis of aorta     - Keep bp and lipids under control  Sickle cell trait      Pt overall stable  We discussed the nature of his chronic back, knee and hip pain which can certainly cause some instability to his gait   We discussed his extensive med list, and the side effects of some of the medication he is taking such as the lyrica and clonazepam   We also went over neurological conditions.  Will start PT and see if we not improvement  Continue meds as prescribed  Follow up with DR. Wilder as scheduled and PRN

## 2024-04-26 ENCOUNTER — OFFICE VISIT (OUTPATIENT)
Dept: CARDIOLOGY | Facility: CLINIC | Age: 84
End: 2024-04-26
Payer: MEDICARE

## 2024-04-26 ENCOUNTER — HOSPITAL ENCOUNTER (OUTPATIENT)
Dept: CARDIOLOGY | Facility: HOSPITAL | Age: 84
Discharge: HOME | End: 2024-04-26
Attending: STUDENT IN AN ORGANIZED HEALTH CARE EDUCATION/TRAINING PROGRAM
Payer: MEDICARE

## 2024-04-26 ENCOUNTER — TELEPHONE (OUTPATIENT)
Dept: PAIN MEDICINE | Facility: CLINIC | Age: 84
End: 2024-04-26

## 2024-04-26 VITALS
OXYGEN SATURATION: 99 % | BODY MASS INDEX: 30.01 KG/M2 | HEIGHT: 72 IN | DIASTOLIC BLOOD PRESSURE: 70 MMHG | WEIGHT: 221.56 LBS | SYSTOLIC BLOOD PRESSURE: 132 MMHG | HEART RATE: 72 BPM

## 2024-04-26 DIAGNOSIS — G47.33 OSA (OBSTRUCTIVE SLEEP APNEA): ICD-10-CM

## 2024-04-26 DIAGNOSIS — E11.59 HYPERTENSION ASSOCIATED WITH DIABETES: ICD-10-CM

## 2024-04-26 DIAGNOSIS — E78.2 COMBINED HYPERLIPIDEMIA ASSOCIATED WITH TYPE 2 DIABETES MELLITUS: Chronic | ICD-10-CM

## 2024-04-26 DIAGNOSIS — E11.42 TYPE 2 DIABETES MELLITUS WITH DIABETIC POLYNEUROPATHY, WITHOUT LONG-TERM CURRENT USE OF INSULIN: ICD-10-CM

## 2024-04-26 DIAGNOSIS — I70.0 ATHEROSCLEROSIS OF AORTA: ICD-10-CM

## 2024-04-26 DIAGNOSIS — M47.816 LUMBAR SPONDYLOSIS: ICD-10-CM

## 2024-04-26 DIAGNOSIS — E11.69 COMBINED HYPERLIPIDEMIA ASSOCIATED WITH TYPE 2 DIABETES MELLITUS: Chronic | ICD-10-CM

## 2024-04-26 DIAGNOSIS — E11.22 DIABETES MELLITUS WITH STAGE 3 CHRONIC KIDNEY DISEASE: ICD-10-CM

## 2024-04-26 DIAGNOSIS — I70.0 AORTIC CALCIFICATION: Chronic | ICD-10-CM

## 2024-04-26 DIAGNOSIS — N18.30 DIABETES MELLITUS WITH STAGE 3 CHRONIC KIDNEY DISEASE: ICD-10-CM

## 2024-04-26 DIAGNOSIS — E11.69 COMBINED HYPERLIPIDEMIA ASSOCIATED WITH TYPE 2 DIABETES MELLITUS: ICD-10-CM

## 2024-04-26 DIAGNOSIS — N18.30 STAGE 3 CHRONIC KIDNEY DISEASE, UNSPECIFIED WHETHER STAGE 3A OR 3B CKD: ICD-10-CM

## 2024-04-26 DIAGNOSIS — I15.2 HYPERTENSION ASSOCIATED WITH DIABETES: ICD-10-CM

## 2024-04-26 DIAGNOSIS — I70.0 AORTIC CALCIFICATION: Primary | ICD-10-CM

## 2024-04-26 DIAGNOSIS — E78.2 COMBINED HYPERLIPIDEMIA ASSOCIATED WITH TYPE 2 DIABETES MELLITUS: ICD-10-CM

## 2024-04-26 DIAGNOSIS — M47.26 OSTEOARTHRITIS OF SPINE WITH RADICULOPATHY, LUMBAR REGION: ICD-10-CM

## 2024-04-26 LAB
OHS QRS DURATION: 98 MS
OHS QTC CALCULATION: 422 MS

## 2024-04-26 PROCEDURE — 1125F AMNT PAIN NOTED PAIN PRSNT: CPT | Mod: CPTII,S$GLB,, | Performed by: STUDENT IN AN ORGANIZED HEALTH CARE EDUCATION/TRAINING PROGRAM

## 2024-04-26 PROCEDURE — 99999 PR PBB SHADOW E&M-EST. PATIENT-LVL IV: CPT | Mod: PBBFAC,,, | Performed by: STUDENT IN AN ORGANIZED HEALTH CARE EDUCATION/TRAINING PROGRAM

## 2024-04-26 PROCEDURE — 1101F PT FALLS ASSESS-DOCD LE1/YR: CPT | Mod: CPTII,S$GLB,, | Performed by: STUDENT IN AN ORGANIZED HEALTH CARE EDUCATION/TRAINING PROGRAM

## 2024-04-26 PROCEDURE — 3288F FALL RISK ASSESSMENT DOCD: CPT | Mod: CPTII,S$GLB,, | Performed by: STUDENT IN AN ORGANIZED HEALTH CARE EDUCATION/TRAINING PROGRAM

## 2024-04-26 PROCEDURE — 1159F MED LIST DOCD IN RCRD: CPT | Mod: CPTII,S$GLB,, | Performed by: STUDENT IN AN ORGANIZED HEALTH CARE EDUCATION/TRAINING PROGRAM

## 2024-04-26 PROCEDURE — 3078F DIAST BP <80 MM HG: CPT | Mod: CPTII,S$GLB,, | Performed by: STUDENT IN AN ORGANIZED HEALTH CARE EDUCATION/TRAINING PROGRAM

## 2024-04-26 PROCEDURE — 93010 ELECTROCARDIOGRAM REPORT: CPT | Mod: ,,, | Performed by: INTERNAL MEDICINE

## 2024-04-26 PROCEDURE — 3075F SYST BP GE 130 - 139MM HG: CPT | Mod: CPTII,S$GLB,, | Performed by: STUDENT IN AN ORGANIZED HEALTH CARE EDUCATION/TRAINING PROGRAM

## 2024-04-26 PROCEDURE — 93005 ELECTROCARDIOGRAM TRACING: CPT | Mod: PO

## 2024-04-26 PROCEDURE — 99214 OFFICE O/P EST MOD 30 MIN: CPT | Mod: S$GLB,,, | Performed by: STUDENT IN AN ORGANIZED HEALTH CARE EDUCATION/TRAINING PROGRAM

## 2024-04-26 NOTE — TELEPHONE ENCOUNTER
Contacted patient regarding referral with Back & Spine. Scheduled appointment. EP Extended visit was scheduled since it has been about a year since he has seen Dr. Brooks. Patient had multiple complaints.  RD

## 2024-04-26 NOTE — PROGRESS NOTES
Section of Cardiology                  Cardiac Clinic Note    Chief Complaint/Reason for consultation:  Decreased exercise tolerance      HPI:   Srinath Mcknight is a 83 y.o. male with h/o hypertension, diabetes, DJD, HLD, CAD, CKD who is here for follow-up.    11/18/20  Seen by Dr. Cobos.  Patient complained of 2-4 weeks of increased fatigue and dyspnea on exertion with moderate exertion.  Denies dyspnea at rest, orthopnea, PND, chest pain, palpitations, lower extremity edema.  For possible diastolic dysfunction, his losartan was increased to 50 mg b.i.d. and started on empagliflozin (jardiance) 10 mg daily.    9/15/21  He presents today c/o DAMON. Started about 6-7 months ago. Has been fairly stable. Says he can walk to the mailbox without SOB, but becomes SOB when he walks back. Says he can't exercise due to pain and SOB. Reports intermittent cough at times with some sputum production. Mild LE swelling. Sleeps on 1 pillow and denies orthopnea, PND. Reports occasional dizziness when he stands from a seated position.     He ambulates with a cane due to multiple surgeries on his back and hip. He is able to perform all of his ADLs. Denies syncope, palpitations, chest pain, fever, vomiting, blurry vision. Denies tobacco use, ETOH use, illicit drug use.       10/12/21  He reports feeling well. He is still getting short of breath with exertion. Does not get SOB at rest.  Denies chest pain, syncope, palpitations, PND, orthopnea. He has never smoked but has been around others who have smoked. He has been trying to exercise but gets short-winded within 5 minutes and would have to stop.         4/14/22  Still getting SOB. Has to stop to catch his breath. Able to walk a little further than before  Denies syncope, but feels that he has balancing issues. Denies tinnitus. Occurs when getting up from seated position    Does not want to wear the CPAP at night  Sees pulmonary, did not qualify for pulmonary rehab      Denies chest pain, syncope, orthopnea, LE swelling, palpitations, chest pain, fever, vomiting.         10/14/22  DAMON is improving  Reports low BP, decreased losartan to daily  Getting injections in knee and back   Doing well   Ambulates with cane, right leg weakness     Denies chest pain, syncope, orthopnea, LE swelling, palpitations      4/18/23  SOB mostly after eating, especially heavy meals  Knee injections are improving his pain  BP stable  Still has issues with balancing, uses cane regularly  Denies falls      Denies chest pain, syncope, orthopnea, LE swelling, palpitations        5/23/23  Comes in with wife  Dizziness with standing- ongoing for some time, thinks getting worse  Orthostatics in clinic normal  Uses a cane for this reason   No ear ringing or pain  BP elevated here and home  Losartan was 1/2'd recently- can't tell if symptoms improved  Has not fallen       7/18/23  No falling  Still having some dizziness, improved after holding flomax  Exercising at the gym, helps his SOB  Gym about twice a week, sometimes 3 times  No chest pain  Weight going up about 4 lbs      10/24/23  Not exercising due to possible COVID  Feeling well  Appetite is good   BP stable  Lost 10 lbs since visit   No falls  SOB stable with exertion    Denies chest pain, syncope         4/26/24  Having knee and hip pain  Fell about 1 month ago, was not using his cane at the time   Balancing issues - chronic   SOB is stable  Back pain with walking   Weight up 9 lbs since last visit  Good appetite - wife cooks most of his foods.       Denies chest pain, syncope         EKG 4/26/24 NSR, T-wave abnormality, consider inferolateral ischemia  EKG 10/24/23 NSR, TWI inferolateral  EKG 4/17/23 NSR, TWI inferolateral leads  EKG 4/14/22 NSR, T-wave abnormality, consider inferolateral ischemia, 74 bpm,   EKG 8/26/21  NSR, T-wave abnormality, consider inferolateral ischemia  EKG 9/15/21 NSR, T-wave abnormality, inferolateral ischemia, 78 bpm,   ms, QTc 401 ms     ECHO 10/12/21  The left ventricle is normal in size with concentric remodeling and normal systolic function.  Normal left ventricular diastolic function.  Normal right ventricular size with normal right ventricular systolic function.  The estimated ejection fraction is 55%.  Mild aortic regurgitation.       ECHO  2014  CONCLUSIONS     1 - Normal left ventricular systolic function (EF 55-60%).     2 - Normal left ventricular diastolic function.     3 - Normal right ventricular systolic function .     4 - Mild aortic regurgitation.     5 - Concentric remodeling.         STRESS TEST 10/12/21  Normal myocardial perfusion scan. There is no evidence of myocardial ischemia or infarction.    The gated perfusion images showed an ejection fraction of 47% at rest. The gated perfusion images showed an ejection fraction of 63% post stress.    The EKG portion of this study is uninterpretable due to significant baseline abnormalities    The patient reported no chest pain during the stress test.      STRESS TEST 2017  EKG Conclusions:   1. The EKG portion of this study is negative for ischemia at a moderate workload, and peak heart rate of 80 bpm (55% of predicted).   2. Blood pressure remained stable throughout the protocol  (Presenting BP: 119/70 Peak BP: 114/49).   3. No significant arrhythmias were present.   4. There were no symptoms of chest discomfort or significant dyspnea throughout the protocol.     Impression: NORMAL MYOCARDIAL PERFUSION   1. The perfusion scan is free of evidence for myocardial ischemia or injury.   2. Resting wall motion is physiologic.   3. Resting LV function is normal.   4. The ventricular volumes are normal at rest and stress.   5. The extracardiac distribution of radioactivity is normal.         KAMALJIT 12/4/20 (Normal ABIs)  The right ankle [DT] artery has triphasic flow.   The right ankle [DP] artery has triphasic flow.   The left ankle [PT] artery has triphasic flow.  The  left ankle [DP] artery has triphasic flow.    [facility]      ROS: All 10 systems reviewed. Please refer to the HPI for pertinent positives. All other systems negative.     Past Medical History  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 ENDO;  Service: Endoscopy;  Laterality: N/A;    HERNIA REPAIR Bilateral 04/18/2017    INJECTION OF JOINT Right 4/14/2023    Procedure: right Synvisc Knee injection;  Surgeon: Hossein Brooks MD;  Location: [facility] PAIN MGT;  Service: Pain Management;  Laterality: Right;    JOINT REPLACEMENT Left 10/09/2017    L YAHIR Dr. Alcocer    LAMINECTOMY  07/22/2016    left elbow  10/2017    procedure to remove gout    lumbar foraminotomy  03/2018    REVISION TOTAL HIP ARTHROPLASTY Left 9/11/2018    Procedure: REVISION, TOTAL ARTHROPLASTY, HIP;  Surgeon: Albaro  Carlyn March MD;  Location: [facility];  Service: Orthopedics;  Laterality: Left;    ROTATOR CUFF REPAIR Left 2006    SHOULDER ARTHROSCOPY W/ ROTATOR CUFF REPAIR Right 2009          Allergies:   Review of patient's allergies indicates:   Allergen Reactions    Sulfa (sulfonamide antibiotics)      Can't recall from 1995       Social History:  Social History     Socioeconomic History    Marital status:     Number of children: 0    Highest education level: Master's degree (e.g., MA, MS, Olegario, MEd, MSW, CARMEL)   Occupational History    Occupation: retired     Comment: teacher   Tobacco Use    Smoking status: Never     Passive exposure: Never    Smokeless tobacco: Never   Substance and Sexual Activity    Alcohol use: Not Currently    Drug use: No    Sexual activity: Not Currently   Social History Narrative    . No children. Retired but some part time work - 4 hours a day. 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 Caffeine intake - sugar free cola, Rare coffee intake. Still drives. Does have a Living Will . Has a nephew Jt Gayle, lives in [place]. Has a friend Karina Rossi, locally who could help him.      Social Determinants of Health     Financial Resource Strain: Medium Risk (8/15/2023)    Overall Financial Resource Strain (CARDIA)     Difficulty of Paying Living Expenses: Somewhat hard   Food Insecurity: No Food Insecurity (8/15/2023)    Hunger Vital Sign     Worried About Running Out of Food in the Last Year: Never true     Ran Out of Food in the Last Year: Never true   Transportation Needs: No Transportation Needs (8/15/2023)    PRAPARE - Transportation     Lack of Transportation (Medical): No     Lack of Transportation (Non-Medical): No   Physical Activity: Insufficiently Active (8/15/2023)    Exercise Vital Sign     Days of Exercise per Week: 2 days     Minutes of Exercise per Session: 30 min   Stress: No Stress Concern Present (8/15/2023)    [employer] [place] [employer] - Occupational Stress  Questionnaire     Feeling of Stress : Not at all   Social Connections: Moderately Isolated (8/15/2023)    Social Connection and Isolation Panel [NHANES]     Frequency of Communication with Friends and Family: More than three times a week     Frequency of Social Gatherings with Friends and Family: Once a week     Attends Jainism Services: Never     Active Member of Clubs or Organizations: No     Attends Club or Organization Meetings: Never     Marital Status:    Housing Stability: Low Risk  (8/15/2023)    Housing Stability Vital Sign     Unable to Pay for Housing in the Last Year: No     Number of Places Lived in the Last Year: 1     Unstable Housing in the Last Year: No       Family History:  family history includes Cancer (age of onset: 50) in his brother; Diabetes in his father, mother, and sister; Drug abuse in his brother; Heart disease in his father and mother; Hypertension in his father and mother; Stroke in his father.    Home Medications:  Current Outpatient Medications on File Prior to Visit   Medication Sig Dispense Refill    allopurinoL (ZYLOPRIM) 100 MG tablet TAKE 1 TABLET EVERY DAY 90 tablet 0    bisacodyl (DULCOLAX) 10 mg Supp   0    blood glucose control, low Soln by Misc.(Non-Drug; Combo Route) route.      blood-glucose meter (TRUE METRIX AIR GLUCOSE METER) kit USE AS DIRECTED 1 each 0    cetirizine (ZYRTEC) 10 MG tablet Take 1 tablet (10 mg total) by mouth every evening. for 14 days 14 tablet 0    clonazePAM (KLONOPIN) 0.5 MG tablet Take 3 tablets (1.5 mg total) by mouth every evening. 270 tablet 0    clotrimazole (LOTRIMIN) 1 % cream Apply topically 2 (two) times daily. for 10 days 60 g 0    cyanocobalamin, vitamin B-12, 1,000 mcg Subl Place 1,000 mcg under the tongue once daily. 90 tablet 3    diclofenac sodium (VOLTAREN) 1 % Gel Apply 2 g topically 4 (four) times daily as needed (pain). 200 g 2    GARLIC EXTRACT ORAL Take 1 tablet by mouth once daily. Per Negin LIAO       lactulose (CHRONULAC) 10 gram/15 mL solution SMARTSI Milliliter(s) By Mouth Daily      losartan (COZAAR) 50 MG tablet Take 1 tablet (50 mg total) by mouth once daily. 90 tablet 3    lubiprostone (AMITIZA) 24 MCG Cap Take 1 capsule (24 mcg total) by mouth 2 (two) times daily with meals. 60 capsule 2    melatonin 10 mg Tab Take 1.5 tablets (15 mg total) by mouth every evening. 45 tablet 11    metFORMIN (GLUCOPHAGE) 1000 MG tablet TAKE 1 TABLET TWICE DAILY WITH MEALS 180 tablet 0    metoprolol succinate (TOPROL-XL) 50 MG 24 hr tablet TAKE 1 TABLET EVERY DAY 90 tablet 3    mometasone 0.1% (ELOCON) 0.1 % cream Apply topically once daily. 50 g 2    multivitamin (THERAGRAN) per tablet Take 1 tablet by mouth once daily.      oxybutynin (DITROPAN) 5 MG Tab Take 1 tablet (5 mg total) by mouth 3 (three) times daily. 30 tablet 0    pramipexole (MIRAPEX) 0.125 MG tablet TAKE 1 TABLET( 0.125 MG) BY MOUTH EVERY EVENING 30 tablet 11    pravastatin (PRAVACHOL) 40 MG tablet TAKE 1 TABLET EVERY DAY 90 tablet 1    pregabalin (LYRICA) 75 MG capsule Take 1-2 capsules ( mg total) by mouth every evening. 180 capsule 1    sildenafiL (VIAGRA) 100 MG tablet Take 1 tablet (100 mg total) by mouth daily as needed for Erectile Dysfunction. 15 tablet 5    TRUE METRIX GLUCOSE TEST STRIP Strp USE AS DIRECTED TO CHECK SUGARS 300 strip 3    TRUEPLUS LANCETS 33 gauge Misc USE AS DIRECTED TO CHECK SUGARS 300 each 3    JARDIANCE 10 mg tablet  (Patient not taking: Reported on 2024)      [DISCONTINUED] ciprofloxacin HCl (CIPRO) 250 MG tablet Take 1 tablet (250 mg total) by mouth 2 (two) times daily. for 7 days 14 tablet 0    [DISCONTINUED] LEXISCAN 0.4 mg/5 mL Syrg  (Patient not taking: Reported on 2024)       No current facility-administered medications on file prior to visit.       Physical exam:  /70 (BP Location: Right arm, Patient Position: Sitting)   Pulse 72   Ht 6' (1.829 m)   Wt 100.5 kg (221 lb 9 oz)   SpO2 99%    BMI 30.05 kg/m²       General: Pt is a 83 y.o. year old male who is AAOx3, in NAD, is pleasant, well nourished, looks stated age, walks with a cane  HEENT: PERRL, EOMI, Oral mucosa pink & moist  CVS  No abnormal cardiac pulsations noted on inspection. JVP not raised. The apical impulse is normal on palpation, and is located in the left 5th intercostal space in the mid - clavicular line. No palpable thrills or abnormal pulsations noted. RR, S1 - S2 heard, 1/6 systolic murmur at apex, rubs or gallops appreciated.   PUL : CTA B/L. No wheezes/crakles heard   ABD : BS +, soft. No tenderness elicited   LE : mild ankle swelling. Distal Pulses palpable B/L         LABS:    Chemistry:   Lab Results   Component Value Date     (H) 12/27/2023    K 4.3 12/27/2023     12/27/2023    CO2 24 12/27/2023    BUN 13 12/27/2023    CREATININE 1.4 12/27/2023    CALCIUM 8.6 (L) 12/27/2023     Cardiac Markers:   Lab Results   Component Value Date    TROPONINI <0.006 03/10/2015     Cardiac Markers (Last 3):   Lab Results   Component Value Date    TROPONINI <0.006 03/10/2015     CBC:   Lab Results   Component Value Date    WBC 11.09 12/18/2023    HGB 11.3 (L) 12/18/2023    HCT 34.5 (L) 12/18/2023    MCV 89 12/18/2023     12/18/2023     Lipids:   Lab Results   Component Value Date    CHOL 138 10/23/2023    TRIG 133 10/23/2023    HDL 33 (L) 10/23/2023     Coagulation:   Lab Results   Component Value Date    INR 1.0 07/31/2019    APTT 26.2 07/31/2019           Assessment    1. Aortic calcification    2. Combined hyperlipidemia associated with type 2 diabetes mellitus    3. Hypertension associated with diabetes    4. Type 2 diabetes mellitus with diabetic polyneuropathy, without long-term current use of insulin    5. Osteoarthritis of spine with radiculopathy, lumbar region    6. Atherosclerosis of aorta    7. Stage 3 chronic kidney disease, unspecified whether stage 3a or 3b CKD    8. Lumbar spondylosis    9. CHARLES (obstructive  sleep apnea)    10. Diabetes mellitus with stage 3 chronic kidney disease              Plan:    Dyspnea on exertion- stable   Due to deconditioning  Echo 10/21 with normal EF, mild AI (unchanged from prior)  Pharmacologic nuclear stress test 10/21 without evidence of ischemia    Dizziness- intermittent   Had stopped flomax    CAD  H/o calcified aorta  Denies angina  Continue aspirin and statin    CKD  stable    HLD  LDL 78 as of 10/23  Continue pravastatin 40 mg daily    HTN  Stable  Continue losartan 50 mg daily, continue toprol xl 50 mg daily    Diabetes  Stable, A1c 6.4  Continue therapy     Arthritis  Chronic back and knee pain  Patient does not take pain medications  Ambulates with a cane          I have reviewed all pertinent chart information.  Plans and recommendations have been formulated under my direct supervision. All questions answered and patient voiced understanding.   If symptoms persist go to the ED.    RTC in 6 months      Yasmine Calvillo MD  Cardiology

## 2024-05-02 ENCOUNTER — TELEPHONE (OUTPATIENT)
Dept: PAIN MEDICINE | Facility: CLINIC | Age: 84
End: 2024-05-02
Payer: MEDICARE

## 2024-05-02 NOTE — TELEPHONE ENCOUNTER
----- Message from Ivania Lyle sent at 4/25/2024  3:00 PM CDT -----  Contact: [place]  Type:  Patient Returning Call    Who Called:[place]  Who Left Message for Patient:lynne  Does the patient know what this is regarding?:scheduling appointment   Would the patient rather a call back or a response via [facility]? call  Best Call Back Number:[phone]   Additional Information:  Pt stated if you cannot reach him it is okay to schedule his appointment it just needs to be after 9am

## 2024-05-02 NOTE — TELEPHONE ENCOUNTER
Returned phone call to pt regarding scheduling am appointment. Left vm for pt to call office back.    .Nicole COTA

## 2024-05-03 ENCOUNTER — CLINICAL SUPPORT (OUTPATIENT)
Dept: REHABILITATION | Facility: HOSPITAL | Age: 84
End: 2024-05-03
Attending: NURSE PRACTITIONER
Payer: MEDICARE

## 2024-05-03 DIAGNOSIS — R53.1 DECREASED STRENGTH, ENDURANCE, AND MOBILITY: Primary | ICD-10-CM

## 2024-05-03 DIAGNOSIS — Z74.09 DECREASED STRENGTH, ENDURANCE, AND MOBILITY: Primary | ICD-10-CM

## 2024-05-03 DIAGNOSIS — R26.81 GAIT INSTABILITY: ICD-10-CM

## 2024-05-03 DIAGNOSIS — R68.89 DECREASED STRENGTH, ENDURANCE, AND MOBILITY: Primary | ICD-10-CM

## 2024-05-03 DIAGNOSIS — R26.89 BALANCE PROBLEM: ICD-10-CM

## 2024-05-03 PROCEDURE — 97162 PT EVAL MOD COMPLEX 30 MIN: CPT | Performed by: PHYSICAL THERAPIST

## 2024-05-03 PROCEDURE — 97535 SELF CARE MNGMENT TRAINING: CPT | Performed by: PHYSICAL THERAPIST

## 2024-05-03 NOTE — PLAN OF CARE
[facility]   Physical Therapy Initial Evaluation      Date: 5/3/2024   Name: Srinath Mcknight  [facility] Number: [id number]    Therapy Diagnosis:    Encounter Diagnoses   Name Primary?    Gait instability     Decreased strength, endurance, and mobility Yes    Balance problem       Physician: Jose Roger NP     Physician Orders: PT Eval and Treat  Medical Diagnosis from Referral: Gait Instability   Evaluation Date: 5/3/2024  Authorization Period Expiration: 12/31/2024  Plan of Care Expiration: 7/3/2024  Progress Note Due: 6/3/2024  Visit # / Visits authorized: 1/1   FOTO: 1/3 (last performed on 5/3/2024)    Precautions: Standard and Fall    Time In: 11:09 am   Time Out: 12:00 pm   Total Billable Time (timed & untimed codes): 51 minutes    Subjective     Date of onset: chronic    History of current condition - [place] reports he has right hip, Right knee, back pain.  Use of straight cane for about 5 years.  He states that his equilibrium is off and has not had anyone who can fix it.  He fell about a month ago and states that when he gets up out of a chair, or when he is walking, he leans to the side and then falls.  The back doesn't hurt when sitting, mostly just when he gets up to start walking.     Patient reports getting dizzy     Falls: one month ago, able to get up on his own.     Imaging: [] Xray [] MRI [] CT: Performed on: N/A    Pain:  Current 1/10, worst 10/10, best 1/10   Location: [x] Right   [x] Left:  right hip, Right knee, Bilateral low back pain  Description: sharp pain in the knee, hip is sharp pain at times, achy in the lower back   Aggravating Factors: walking  Easing Factors: activity avoidance, rest    Prior Therapy:   [] N/A    [x] Yes:   Social History: Pt lives with their family  Occupation: Pt is retired   Prior Level of Function: Independent and pain free with all ADL, IADL, community mobility and functional activities.   Current Level of Function: Independent with  all ADL, IADL, community mobility and functional activities with reports of increased pain and need for increased time and frequent breaks.      Dominant Extremity:    [x] Right    [] Left    Pts goals: Pt reported goals are to decrease overall pain levels in order to return to prior functional level.     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    Trouble in sleeping     Type 2 diabetes mellitus with diabetic polyneuropathy, without long-term current use of insulin     Urinary incontinence     Uses hearing aid     bilat       Surgical History:   Srinath Mcknight  has a past surgical history that includes Rotator cuff repair (Left, 2006); Shoulder arthroscopy w/ rotator cuff repair (Right, 2009); Laminectomy (07/22/2016); Hernia repair (Bilateral, 04/18/2017); Joint replacement (Left, 10/09/2017); Back surgery (2019); lumbar foraminotomy (03/2018); left elbow (10/2017); Revision total hip arthroplasty (Left, 9/11/2018); Esophagogastroduodenoscopy (N/A, 6/30/2020); Colonoscopy (N/A, 6/30/2020); and Injection of joint (Right, 4/14/2023).    Medications:   [place] has a current medication list which includes the following prescription(s): allopurinol, bisacodyl, blood glucose control, low, true metrix air glucose meter, cetirizine, clonazepam, clotrimazole, cyanocobalamin (vitamin b-12), diclofenac sodium, garlic extract,  jardiance, lactulose, losartan, lubiprostone, melatonin, metformin, metoprolol succinate, mometasone 0.1%, multivitamin, oxybutynin, pramipexole, pravastatin, pregabalin, sildenafil, true metrix glucose test strip, and trueplus lancets.    Allergies:   Review of patient's allergies indicates:   Allergen Reactions    Sulfa (sulfonamide antibiotics)      Can't recall from 1995        Objective      STRENGTH:   L/E MMT Right  (spine) Left Pain/Dysfunction with Movement Goal   Hip Flexion  4-/5 3+/5  4+/5 B   Hip Extension  3/5 3/5 Tested in seated position 4+/5 B   Hip Abduction  4/5 4/5 Tested in seated position 4+/5 B   Knee Extension 4/5 4+/5  5/5 B   Knee Flexion 4/5 4/5 Tested in seated position 5/5 B   Ankle DF 3+/5 4/5  5/5 B        SENSATION  [x] Intact to Light Touch   [] Impaired:    POSTURE:  Pt presents with postural abnormalities which include:    [x] Forward Head   [] Increased Lumbar Lordosis   [x] Rounded Shoulder   [] Genu Recurvatum   [] Increased Thoracic Kyphosis [] Genu Valgus   [] Trunk Deviated    [] Pes Planus   [] Scapular Winging    [x] Other: Forward flexed posture         GAIT ANALYSIS The patient ambulated with the following assistive device: straight cane; the pt presents with the following gait abnormalities: trendelenburg, increased KENDRA, and slow pace    BALANCE:   Right   (seconds) Left  (seconds) Pain/dysfunction Noted Goal   Tandem Stance    60+ seconds   Single Leg Stance U/A U/A  60+ seconds       FUNCTIONAL TESTING    Outcome Norms Goal   Timed Up and Go 19 seconds with cane <13.5 sec 15 seconds   Five Time Sit to Stand 31 seconds  Uncontrolled descent on one sit-to-stand  60s: <11.4 sec  70s: <12.6 sec  80s: 14.8 sec 18 seconds       Function:     Intake Outcome Measure for FOTO Balance Survey    Therapist reviewed FOTO scores for [place] on 5/3/2024.   FOTO report - see Media section or FOTO account for episode details    Intake Score: 43%  Goal 50%          Treatment      Total Treatment time (time-based codes) separate from Evaluation: (8 minutes) minutes     [place] received the treatments listed below:      Self-care:/education below:    Patient Education and Home Exercises     Education provided: (8 minutes) minutes  PURPOSE: Patient educated on the impairments noted above and the effects of physical therapy intervention to improve overall condition and QOL.   EXERCISE: Patient was educated on all the above exercise prior/during/after for proper posture, positioning, and execution for safe performance with home exercise program.   STRENGTH: Patient educated on the importance of improved core and extremity strength in order to improve alignment of the spine and extremities with static positions and dynamic movement.   GAIT & BALANCE: Patient educated on the importance of strong core and lower extremity musculature in order to improve both static and dynamic balance, improve gait mechanics, reduce fall risk and improve household and community mobility.   ASSISTIVE DEVICE: Patient educated on proper utilization of assistive device for safe and efficient ambulation and to reduce risk of falls  POSTURE: Patient educated on postural awareness to reduce stress and maintain optimal alignment of the spine with static positions and dynamic movement     Written Home Exercises Provided: yes.  Exercises were reviewed and [place] was able to demonstrate them prior to the end of the session.  [place] demonstrated good  understanding of the education provided. See EMR under Patient Instructions for exercises provided during therapy sessions.    Assessment     [place] is a 83 y.o. male referred to outpatient Physical Therapy with a medical diagnosis of Gait Instability. Pt presents with impairments in the following categories: IMPAIRMENTS: ROM, strength, endurance, joint mobility, balance, posture, gait mechanics, core strength and stability, functional movement patterns, and  "coordination    Pt prognosis is Good  Pt will benefit from skilled outpatient Physical Therapy to address the deficits stated above and in the chart below, provide pt/family education, and to maximize pt's level of independence.     Plan of care discussed with patient: Yes  Pt's spiritual, cultural and educational needs considered and patient is agreeable to the plan of care and goals as stated below:     Anticipated Barriers for therapy: co-morbidities, chronicity of condition, and adherence to treatment plan    Medical Necessity is demonstrated by the following  History  Co-morbidities and personal factors that may impact the plan of care [] LOW: no personal factors / co-morbidities  [] MODERATE: 1-2 personal factors / co-morbidities  [x] HIGH: 3+ personal factors / co-morbidities    Moderate / High Support Documentation:     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    Trouble in sleeping     Type 2 diabetes mellitus with diabetic polyneuropathy, without long-term current use of insulin     Urinary incontinence     Uses hearing aid     bilat        Examination  Body Structures and Functions, activity limitations and participation restrictions that may impact the plan of care [] LOW: addressing 1-2 elements  [x] MODERATE: 3+ elements  [] HIGH: 4+ elements (please support below)    Moderate / High Support Documentation: See above in "Current " "Level of Function"      Clinical Presentation [] LOW: stable  [x] MODERATE: Evolving  [] HIGH: Unstable     Decision Making/ Complexity Score: moderate         Short Term Goals:  4 weeks Status  Date Met   PAIN: Pt will report worst pain of 6/10 in order to progress toward max functional ability and improve quality of life. [x] Progressing  [] Met  [] Not Met    STRENGTH: Patient will improve strength to 50% of stated goals, listed in objective measures above, in order to progress towards independence with functional activities. [x] Progressing  [] Met  [] Not Met    POSTURE: Patient will correct postural deviations in sitting and standing, to decrease pain and promote long term stability.  [x] Progressing  [] Met  [] Not Met    HEP: Patient will demonstrate independence with HEP in order to progress toward functional independence. [x] Progressing  [] Met  [] Not Met    Function: Patient will improve five time sit-to-stand to 18 seconds.  [x] Progressing  [] Met  [] Not Met      Long Term Goals:  8 weeks Status Date Met   PAIN: Pt will report worst pain of 3/10 in order to progress toward max functional ability and improve quality of life [x] Progressing  [] Met  [] Not Met    FUNCTION: Patient will demonstrate improved function as indicated by a score of greater than or equal to 50 out of 100 on FOTO. [x] Progressing  [] Met  [] Not Met    STRENGTH: Patient will improve strength to stated goals, listed in objective measures above, in order to improve functional independence and quality of life.  [x] Progressing  [] Met  [] Not Met    GAIT: Patient will demonstrate normalized gait mechanics with minimal compensation in order to return to PLOF. [x] Progressing  [] Met  [] Not Met    Patient will return to normal ADL's, IADL's, community involvement, recreational activities, and work-related activities with less than or equal to 3/10 pain and maximal function.  [x] Progressing  [] Met  [] Not Met      Plan     Plan of " care Certification: 5/3/2024 to 7/3/2024.    Outpatient Physical Therapy 2 times weekly for 8 weeks to include any combination of the following interventions: virtual visits, dry needling, modalities, electrical stimulation (IFC, Pre-Mod, Attended with Functional Dry Needling), Cervical/Lumbar Traction, Gait Training, Manual Therapy, Neuromuscular Re-ed, Patient Education, Self Care, Therapeutic Exercise, and Therapeutic Activites     Jennifer Kelley, PT, DPT

## 2024-05-06 ENCOUNTER — CLINICAL SUPPORT (OUTPATIENT)
Dept: REHABILITATION | Facility: HOSPITAL | Age: 84
End: 2024-05-06
Payer: MEDICARE

## 2024-05-06 DIAGNOSIS — R68.89 DECREASED STRENGTH, ENDURANCE, AND MOBILITY: ICD-10-CM

## 2024-05-06 DIAGNOSIS — R26.89 BALANCE PROBLEM: Primary | ICD-10-CM

## 2024-05-06 DIAGNOSIS — Z74.09 DECREASED STRENGTH, ENDURANCE, AND MOBILITY: ICD-10-CM

## 2024-05-06 DIAGNOSIS — R53.1 DECREASED STRENGTH, ENDURANCE, AND MOBILITY: ICD-10-CM

## 2024-05-06 PROCEDURE — 97140 MANUAL THERAPY 1/> REGIONS: CPT | Performed by: PHYSICAL THERAPIST

## 2024-05-06 PROCEDURE — 97110 THERAPEUTIC EXERCISES: CPT | Performed by: PHYSICAL THERAPIST

## 2024-05-06 PROCEDURE — 97112 NEUROMUSCULAR REEDUCATION: CPT | Performed by: PHYSICAL THERAPIST

## 2024-05-08 PROBLEM — R53.1 DECREASED STRENGTH, ENDURANCE, AND MOBILITY: Status: ACTIVE | Noted: 2024-05-08

## 2024-05-08 PROBLEM — Z74.09 DECREASED STRENGTH, ENDURANCE, AND MOBILITY: Status: ACTIVE | Noted: 2024-05-08

## 2024-05-08 PROBLEM — R68.89 DECREASED STRENGTH, ENDURANCE, AND MOBILITY: Status: ACTIVE | Noted: 2024-05-08

## 2024-05-09 ENCOUNTER — CLINICAL SUPPORT (OUTPATIENT)
Dept: REHABILITATION | Facility: HOSPITAL | Age: 84
End: 2024-05-09
Payer: MEDICARE

## 2024-05-09 DIAGNOSIS — R68.89 DECREASED STRENGTH, ENDURANCE, AND MOBILITY: ICD-10-CM

## 2024-05-09 DIAGNOSIS — Z74.09 DECREASED STRENGTH, ENDURANCE, AND MOBILITY: ICD-10-CM

## 2024-05-09 DIAGNOSIS — R26.89 BALANCE PROBLEM: Primary | ICD-10-CM

## 2024-05-09 DIAGNOSIS — G47.61 PLMD (PERIODIC LIMB MOVEMENT DISORDER): Chronic | ICD-10-CM

## 2024-05-09 DIAGNOSIS — R53.1 DECREASED STRENGTH, ENDURANCE, AND MOBILITY: ICD-10-CM

## 2024-05-09 PROCEDURE — 97112 NEUROMUSCULAR REEDUCATION: CPT | Performed by: PHYSICAL THERAPIST

## 2024-05-09 PROCEDURE — 97110 THERAPEUTIC EXERCISES: CPT | Performed by: PHYSICAL THERAPIST

## 2024-05-09 RX ORDER — PRAMIPEXOLE DIHYDROCHLORIDE 0.12 MG/1
0.12 TABLET ORAL NIGHTLY
Qty: 90 TABLET | Refills: 2 | Status: SHIPPED | OUTPATIENT
Start: 2024-05-09 | End: 2025-02-03

## 2024-05-09 NOTE — PROGRESS NOTES
"[facility]   Physical Therapy Treatment Note        Name: [place] [facility] Number: [id number]    Therapy Diagnosis:   Encounter Diagnoses   Name Primary?    Balance problem Yes    Decreased strength, endurance, and mobility      Physician: Jose Roger NP    Visit Date: 5/6/2024    Physician Orders: PT Eval and Treat  Medical Diagnosis from Referral: Gait Instability   Evaluation Date: 5/3/2024  Authorization Period Expiration: 12/31/2024  Plan of Care Expiration: 7/3/2024  Progress Note Due: 6/3/2024  Visit # / Visits authorized: 1/1   FOTO: 1/3 (last performed on 5/3/2024)     Precautions: Standard and Fall  PTA Visit #: 0/5     Time In: 3:02 pm   Time Out: 3:55 pm   Total Billable Time: 53 minutes (Billing reflects 1 on 1 treatment time spent with patient)    Subjective     Patient reports: he was in the car for an hour prior to coming here.  His back is only hurting when walking.    He/She was compliant with home exercise program.  Response to previous treatment: good  Functional change: no change at this time     Pain: 3/10     Location: knee    Objective      Objective Measures updated at progress report or POC update only unless otherwise noted.       Treatment     [place] received the treatments listed below:       MANUAL THERAPY TECHNIQUES were applied for (10) minutes, including:    Soft tissue mobilization:   right  ITB, quadriceps  Joint mobilizations:   NA  Functional dry needling: DEFERRED        THERAPEUTIC EXERCISES to develop strength, endurance, ROM, flexibility, posture, and core stabilization for (15) minutes including:    Performed Today:   Lower trunk rotation x 3 Minutes   Hamstring stretch eom 2 x 30" Bilateral Lower extremity    Nu-step 6 minues    Interventions DEFERRED today                  NEUROMUSCULAR RE-EDUCATION ACTIVITIES to improve Balance, Coordination, Kinesthetic, Sense, Proprioception, and Posture for (28) minutes.  The following were " included:    Performed Today:   Short arc quad 2 x 10 Bilateral Lower extremity    Straight leg raise 2 x 5 Bilateral Lower extremity    Bridges 2 x 10  Long Arc Quads 2 x 10  Interventions DEFERRED today                  THERAPEUTIC ACTIVITIES to improve dynamic and functional  performance for (0) minutes including:    Performed Today:      Interventions DEFERRED today                  Next Session:  Progress resistance        Patient Education and Home Exercises       Home Exercises Provided and Patient Education Provided     Education provided: (with treatment above) minutes  PURPOSE: Patient educated on the impairments noted above and the effects of physical therapy intervention to improve overall condition and QOL.   EXERCISE: Patient was educated on all the above exercise prior/during/after for proper posture, positioning, and execution for safe performance with home exercise program.   STRENGTH: Patient educated on the importance of improved core and extremity strength in order to improve alignment of the spine and extremities with static positions and dynamic movement.   GAIT & BALANCE: Patient educated on the importance of strong core and lower extremity musculature in order to improve both static and dynamic balance, improve gait mechanics, reduce fall risk and improve household and community mobility.   POSTURE: Patient educated on postural awareness to reduce stress and maintain optimal alignment of the spine with static positions and dynamic movement     Written Home Exercises Provided: yes.  Exercises were reviewed and [place] was able to demonstrate them prior to the end of the session.  [place] demonstrated good  understanding of the education provided. See EMR under Patient Instructions for exercises provided during therapy sessions.    Assessment     Patient tolerated session well with initiation of therex.  He was given Verbal Cues for form with exercise to ensure proper form.       [place] is progressing well towards his goals.   Patient prognosis is Good.     Patient will continue to benefit from skilled outpatient physical therapy to address the deficits listed in the problem list box on initial evaluation, provide pt/family education and to maximize patient's level of independence in the home and community environment.     Patient's spiritual, cultural and educational needs considered and pt agreeable to plan of care and goals.     Anticipated Barriers for therapy: co-morbidities, chronicity of condition, and adherence to treatment plan        Short Term Goals:  4 weeks Status  Date Met   PAIN: Pt will report worst pain of 6/10 in order to progress toward max functional ability and improve quality of life. [x] Progressing  [] Met  [] Not Met     STRENGTH: Patient will improve strength to 50% of stated goals, listed in objective measures above, in order to progress towards independence with functional activities. [x] Progressing  [] Met  [] Not Met     POSTURE: Patient will correct postural deviations in sitting and standing, to decrease pain and promote long term stability.  [x] Progressing  [] Met  [] Not Met     HEP: Patient will demonstrate independence with HEP in order to progress toward functional independence. [x] Progressing  [] Met  [] Not Met     Function: Patient will improve five time sit-to-stand to 18 seconds.  [x] Progressing  [] Met  [] Not Met        Long Term Goals:  8 weeks Status Date Met   PAIN: Pt will report worst pain of 3/10 in order to progress toward max functional ability and improve quality of life [x] Progressing  [] Met  [] Not Met     FUNCTION: Patient will demonstrate improved function as indicated by a score of greater than or equal to 50 out of 100 on FOTO. [x] Progressing  [] Met  [] Not Met     STRENGTH: Patient will improve strength to stated goals, listed in objective measures above, in order to improve functional independence and quality of  life.  [x] Progressing  [] Met  [] Not Met     GAIT: Patient will demonstrate normalized gait mechanics with minimal compensation in order to return to PLOF. [x] Progressing  [] Met  [] Not Met     Patient will return to normal ADL's, IADL's, community involvement, recreational activities, and work-related activities with less than or equal to 3/10 pain and maximal function.  [x] Progressing  [] Met  [] Not Met          Plan     Continue Plan of Care (POC) and progress per patient tolerance. See treatment section for details on planned progressions next session.      Jennifer Kelley, PT

## 2024-05-09 NOTE — PROGRESS NOTES
"[facility]   Physical Therapy Treatment Note        Name: Srinath Mcknight  [facility] Number: [id number]    Therapy Diagnosis:   Encounter Diagnoses   Name Primary?    Balance problem Yes    Decreased strength, endurance, and mobility      Physician: Jose Roger NP    Visit Date: 5/9/2024    Physician Orders: PT Eval and Treat  Medical Diagnosis from Referral: Gait Instability   Evaluation Date: 5/3/2024  Authorization Period Expiration: 12/31/2024  Plan of Care Expiration: 7/3/2024  Progress Note Due: 6/3/2024  Visit # / Visits authorized: 2/20 + eval    FOTO: 1/3 (last performed on 5/3/2024)     Precautions: Standard and Fall  PTA Visit #: 0/5     Time In: 3:08 pm   Time Out: 4:01 pm   Total Billable Time: 53 minutes (Billing reflects 1 on 1 treatment time spent with patient)    Subjective     Patient reports: he was in the car for an hour prior to coming here.  His back is only hurting when walking.    He/She was compliant with home exercise program.  Response to previous treatment: good  Functional change: no change at this time     Pain: 3/10     Location: knee    Objective      Objective Measures updated at progress report or POC update only unless otherwise noted.       Treatment     [place] received the treatments listed below:       MANUAL THERAPY TECHNIQUES were applied for (0) minutes, including:    Soft tissue mobilization:   right  ITB, quadriceps  Joint mobilizations:   NA  Functional dry needling: DEFERRED        THERAPEUTIC EXERCISES to develop strength, endurance, ROM, flexibility, posture, and core stabilization for (15) minutes including:    Performed Today:   Lower trunk rotation x 3 Minutes   Hamstring stretch eom 2 x 30" Bilateral Lower extremity    Nu-step 8 minues    Interventions DEFERRED today                  NEUROMUSCULAR RE-EDUCATION ACTIVITIES to improve Balance, Coordination, Kinesthetic, Sense, Proprioception, and Posture for (38) minutes.  The following " "were included: (increased time for added reps today.     Performed Today:   Short arc quad 2 x 10 Bilateral Lower extremity    Straight leg raise 2 x 10 Bilateral Lower extremity    Bridges 2 x 10    Long Arc Quads 3 x 10 with 3" hold   Alternating marching with ab bracing 3 Minutes  Interventions DEFERRED today                  THERAPEUTIC ACTIVITIES to improve dynamic and functional  performance for (0) minutes including:    Performed Today:      Interventions DEFERRED today                  Next Session:  Progress resistance        Patient Education and Home Exercises       Home Exercises Provided and Patient Education Provided     Education provided: (with treatment above) minutes  PURPOSE: Patient educated on the impairments noted above and the effects of physical therapy intervention to improve overall condition and QOL.   EXERCISE: Patient was educated on all the above exercise prior/during/after for proper posture, positioning, and execution for safe performance with home exercise program.   STRENGTH: Patient educated on the importance of improved core and extremity strength in order to improve alignment of the spine and extremities with static positions and dynamic movement.   GAIT & BALANCE: Patient educated on the importance of strong core and lower extremity musculature in order to improve both static and dynamic balance, improve gait mechanics, reduce fall risk and improve household and community mobility.   POSTURE: Patient educated on postural awareness to reduce stress and maintain optimal alignment of the spine with static positions and dynamic movement     Written Home Exercises Provided: yes.  Exercises were reviewed and [place] was able to demonstrate them prior to the end of the session.  [place] demonstrated good  understanding of the education provided. See EMR under Patient Instructions for exercises provided during therapy sessions.    Assessment     Patient tolerated session well with " increased reps for all interventions performed today.  Decreased cramping in the right hamstring today with bridges.       [place] is progressing well towards his goals.   Patient prognosis is Good.     Patient will continue to benefit from skilled outpatient physical therapy to address the deficits listed in the problem list box on initial evaluation, provide pt/family education and to maximize patient's level of independence in the home and community environment.     Patient's spiritual, cultural and educational needs considered and pt agreeable to plan of care and goals.     Anticipated Barriers for therapy: co-morbidities, chronicity of condition, and adherence to treatment plan        Short Term Goals:  4 weeks Status  Date Met   PAIN: Pt will report worst pain of 6/10 in order to progress toward max functional ability and improve quality of life. [x] Progressing  [] Met  [] Not Met     STRENGTH: Patient will improve strength to 50% of stated goals, listed in objective measures above, in order to progress towards independence with functional activities. [x] Progressing  [] Met  [] Not Met     POSTURE: Patient will correct postural deviations in sitting and standing, to decrease pain and promote long term stability.  [x] Progressing  [] Met  [] Not Met     HEP: Patient will demonstrate independence with HEP in order to progress toward functional independence. [x] Progressing  [] Met  [] Not Met     Function: Patient will improve five time sit-to-stand to 18 seconds.  [x] Progressing  [] Met  [] Not Met        Long Term Goals:  8 weeks Status Date Met   PAIN: Pt will report worst pain of 3/10 in order to progress toward max functional ability and improve quality of life [x] Progressing  [] Met  [] Not Met     FUNCTION: Patient will demonstrate improved function as indicated by a score of greater than or equal to 50 out of 100 on FOTO. [x] Progressing  [] Met  [] Not Met     STRENGTH: Patient will improve  strength to stated goals, listed in objective measures above, in order to improve functional independence and quality of life.  [x] Progressing  [] Met  [] Not Met     GAIT: Patient will demonstrate normalized gait mechanics with minimal compensation in order to return to PLOF. [x] Progressing  [] Met  [] Not Met     Patient will return to normal ADL's, IADL's, community involvement, recreational activities, and work-related activities with less than or equal to 3/10 pain and maximal function.  [x] Progressing  [] Met  [] Not Met          Plan     Continue Plan of Care (POC) and progress per patient tolerance. See treatment section for details on planned progressions next session.      Jennifer Kelley, PT

## 2024-05-13 ENCOUNTER — CLINICAL SUPPORT (OUTPATIENT)
Dept: REHABILITATION | Facility: HOSPITAL | Age: 84
End: 2024-05-13
Payer: MEDICARE

## 2024-05-13 DIAGNOSIS — Z74.09 DECREASED STRENGTH, ENDURANCE, AND MOBILITY: ICD-10-CM

## 2024-05-13 DIAGNOSIS — R68.89 DECREASED STRENGTH, ENDURANCE, AND MOBILITY: ICD-10-CM

## 2024-05-13 DIAGNOSIS — R53.1 DECREASED STRENGTH, ENDURANCE, AND MOBILITY: ICD-10-CM

## 2024-05-13 DIAGNOSIS — R26.89 BALANCE PROBLEM: Primary | ICD-10-CM

## 2024-05-13 PROCEDURE — 97110 THERAPEUTIC EXERCISES: CPT | Mod: KX | Performed by: PHYSICAL THERAPIST

## 2024-05-13 PROCEDURE — 97112 NEUROMUSCULAR REEDUCATION: CPT | Mod: KX | Performed by: PHYSICAL THERAPIST

## 2024-05-13 NOTE — PROGRESS NOTES
"  [facility]   Physical Therapy Treatment Note        Name: Srinath Mcknight  [facility] Number: [id number]    Therapy Diagnosis:   Encounter Diagnoses   Name Primary?    Balance problem Yes    Decreased strength, endurance, and mobility      Physician: Jose Roger NP    Visit Date: 5/13/2024    Physician Orders: PT Eval and Treat  Medical Diagnosis from Referral: Gait Instability   Evaluation Date: 5/3/2024  Authorization Period Expiration: 12/31/2024  Plan of Care Expiration: 7/3/2024  Progress Note Due: 6/3/2024  Visit # / Visits authorized: 3/20 + eval    FOTO: 1/3 (last performed on 5/3/2024)     Precautions: Standard and Fall  PTA Visit #: 0/5     Time In: 3:03 pm   Time Out: 3:45 pm   Total Billable Time: 42 minutes (Billing reflects 1 on 1 treatment time spent with patient)    Subjective     Patient reports: he had pain at 5/10 while driving to session but once on Nu-step to start he reported pain at 0/10.     He/She was compliant with home exercise program.  Response to previous treatment: good  Functional change: no change at this time     Pain: 5/10     Location: knee    Objective      Objective Measures updated at progress report or POC update only unless otherwise noted.     CPT Intervention Duration / Intensity  5/13/24   MT       TE Nu-step  6 minutes, level 2      Leg Press  40x, 95#     Tailgaters  5 minutes, 5#     Long Arc Quads  3 x 10 with 3" hold, 5#    NMR Straight leg raise 2 x 10 Bilateral Lower extremity       Bridges  2 x 10   TA         Heel lift put in left shoe due to ~ 3 cm leg length difference     PLAN         CPT Codes available for Billing:   (00) minutes of Manual therapy (MT) to improve pain and ROM.  (15) minutes of Therapeutic Exercise (TE) to develop strength, endurance, range of motion, and flexibility.  (27) minutes of Neuromuscular Re-Education (NMR)  to improve: Balance, Coordination, Kinesthetic, Sense, Proprioception, and Posture.  (00) minutes of " Therapeutic Activities (TA) to improve functional performance.    Treatment     [place] received the treatments listed below:       Patient Education and Home Exercises       Home Exercises Provided and Patient Education Provided     Education provided: (with treatment above) minutes  PURPOSE: Patient educated on the impairments noted above and the effects of physical therapy intervention to improve overall condition and QOL.   EXERCISE: Patient was educated on all the above exercise prior/during/after for proper posture, positioning, and execution for safe performance with home exercise program.   STRENGTH: Patient educated on the importance of improved core and extremity strength in order to improve alignment of the spine and extremities with static positions and dynamic movement.   GAIT & BALANCE: Patient educated on the importance of strong core and lower extremity musculature in order to improve both static and dynamic balance, improve gait mechanics, reduce fall risk and improve household and community mobility.   POSTURE: Patient educated on postural awareness to reduce stress and maintain optimal alignment of the spine with static positions and dynamic movement     Written Home Exercises Provided: yes.  Exercises were reviewed and [place] was able to demonstrate them prior to the end of the session.  [place] demonstrated good  understanding of the education provided. See EMR under Patient Instructions for exercises provided during therapy sessions.    Assessment     Patient was able to perform leg press today, however, had to have extra padding added under the left foot to allow for even push.  Measurement of the legs noted to have a ~3 cm difference from left to right therefore, 1/4 inch lift was put in left to create level length.  Patient noted improvement right away with standing but agrees that he will need more height. Will determine tolerance of this initial lift first before putting in  aspen Yo is progressing well towards his goals.   Patient prognosis is Good.     Patient will continue to benefit from skilled outpatient physical therapy to address the deficits listed in the problem list box on initial evaluation, provide pt/family education and to maximize patient's level of independence in the home and community environment.     Patient's spiritual, cultural and educational needs considered and pt agreeable to plan of care and goals.     Anticipated Barriers for therapy: co-morbidities, chronicity of condition, and adherence to treatment plan        Short Term Goals:  4 weeks Status  Date Met   PAIN: Pt will report worst pain of 6/10 in order to progress toward max functional ability and improve quality of life. [x] Progressing  [] Met  [] Not Met     STRENGTH: Patient will improve strength to 50% of stated goals, listed in objective measures above, in order to progress towards independence with functional activities. [x] Progressing  [] Met  [] Not Met     POSTURE: Patient will correct postural deviations in sitting and standing, to decrease pain and promote long term stability.  [x] Progressing  [] Met  [] Not Met     HEP: Patient will demonstrate independence with HEP in order to progress toward functional independence. [x] Progressing  [] Met  [] Not Met     Function: Patient will improve five time sit-to-stand to 18 seconds.  [x] Progressing  [] Met  [] Not Met        Long Term Goals:  8 weeks Status Date Met   PAIN: Pt will report worst pain of 3/10 in order to progress toward max functional ability and improve quality of life [x] Progressing  [] Met  [] Not Met     FUNCTION: Patient will demonstrate improved function as indicated by a score of greater than or equal to 50 out of 100 on FOTO. [x] Progressing  [] Met  [] Not Met     STRENGTH: Patient will improve strength to stated goals, listed in objective measures above, in order to improve functional independence and  quality of life.  [x] Progressing  [] Met  [] Not Met     GAIT: Patient will demonstrate normalized gait mechanics with minimal compensation in order to return to PLOF. [x] Progressing  [] Met  [] Not Met     Patient will return to normal ADL's, IADL's, community involvement, recreational activities, and work-related activities with less than or equal to 3/10 pain and maximal function.  [x] Progressing  [] Met  [] Not Met          Plan     Continue Plan of Care (POC) and progress per patient tolerance. See treatment section for details on planned progressions next session.      Jennifer Kelley, PT

## 2024-05-15 ENCOUNTER — CLINICAL SUPPORT (OUTPATIENT)
Dept: REHABILITATION | Facility: HOSPITAL | Age: 84
End: 2024-05-15
Payer: MEDICARE

## 2024-05-15 DIAGNOSIS — Z74.09 DECREASED STRENGTH, ENDURANCE, AND MOBILITY: ICD-10-CM

## 2024-05-15 DIAGNOSIS — R26.89 BALANCE PROBLEM: Primary | ICD-10-CM

## 2024-05-15 DIAGNOSIS — R68.89 DECREASED STRENGTH, ENDURANCE, AND MOBILITY: ICD-10-CM

## 2024-05-15 DIAGNOSIS — R53.1 DECREASED STRENGTH, ENDURANCE, AND MOBILITY: ICD-10-CM

## 2024-05-15 PROCEDURE — 97112 NEUROMUSCULAR REEDUCATION: CPT

## 2024-05-15 PROCEDURE — 97110 THERAPEUTIC EXERCISES: CPT

## 2024-05-15 PROCEDURE — 97530 THERAPEUTIC ACTIVITIES: CPT

## 2024-05-15 NOTE — PROGRESS NOTES
"  [facility]   Physical Therapy Treatment Note        Name: Srinath Mcknight  [facility] Number: [id number]    Therapy Diagnosis:   Encounter Diagnoses   Name Primary?    Balance problem Yes    Decreased strength, endurance, and mobility      Physician: Jose Roger NP    Visit Date: 5/15/2024    Physician Orders: PT Eval and Treat  Medical Diagnosis from Referral: Gait Instability   Evaluation Date: 5/3/2024  Authorization Period Expiration: 12/31/2024  Plan of Care Expiration: 7/3/2024  Progress Note Due: 6/3/2024  Visit # / Visits authorized: 4 /20 + eval    FOTO: 1/3 (last performed on 5/3/2024)     Precautions: Standard and Fall  PTA Visit #: 0/5     Time In: 1400  Time Out: 1500  Total Billable Time: 55 minutes (Billing reflects 1 on 1 treatment time spent with patient)    Subjective     Patient reports: He is hurting pretty good through the right knee and the right groin.  He doesn't like to do his exercises but he has been doing them daily.    He/She was compliant with home exercise program.  Response to previous treatment: good  Functional change: no change at this time     Pain: 5/10     Location: knee    Objective      Objective Measures updated at progress report or POC update only unless otherwise noted.       Treatment     [place] received the treatments listed below:       MANUAL THERAPY TECHNIQUES were applied for (0) minutes, including:    Soft tissue mobilization:   right  ITB, quadriceps  Joint mobilizations:   NA  Functional dry needling: DEFERRED    THERAPEUTIC EXERCISES to develop strength, endurance, ROM, flexibility, posture, and core stabilization for (22) minutes including:    Performed Today:   Nu-step, x8 minutes  Gastroc Stretch- slant Board- standing- 30" x4 ea  Hamstring stretch seated 2 x 30" Bilateral Lower extremity  Interventions DEFERRED today   Lower trunk rotation x 3 Minutes                  NEUROMUSCULAR RE-EDUCATION ACTIVITIES to improve Balance, " "Coordination, Kinesthetic, Sense, Proprioception, and Posture for (8) minutes.    The following were included: (increased time for added reps today.     Performed Today:   Seated Thoracic Extension over 1/2 foam roll x10 Interventions DEFERRED today   Short arc quad 2 x 10 Bilateral Lower extremity    Straight leg raise 2 x 10 Bilateral Lower extremity    Bridges 2 x 10    Long Arc Quads 3 x 10 with 3" hold   Alternating marching with ab bracing 3 Minutes                THERAPEUTIC ACTIVITIES to improve dynamic and functional  performance for (25) minutes including:    Performed Today:   Posture stands with arm support, 10" holds  Standing Stair Steps, alt x20  Standing Forward Step Ups @steps, x15 alternating  Controlled Sit Downs to stand, 3x5 Interventions DEFERRED today              Next Session:  Progress resistance        Patient Education and Home Exercises       Home Exercises Provided and Patient Education Provided     Education provided: (with treatment above) minutes  PURPOSE: Patient educated on the impairments noted above and the effects of physical therapy intervention to improve overall condition and QOL.   EXERCISE: Patient was educated on all the above exercise prior/during/after for proper posture, positioning, and execution for safe performance with home exercise program.   STRENGTH: Patient educated on the importance of improved core and extremity strength in order to improve alignment of the spine and extremities with static positions and dynamic movement.   GAIT & BALANCE: Patient educated on the importance of strong core and lower extremity musculature in order to improve both static and dynamic balance, improve gait mechanics, reduce fall risk and improve household and community mobility.   POSTURE: Patient educated on postural awareness to reduce stress and maintain optimal alignment of the spine with static positions and dynamic movement     Written Home Exercises Provided: yes.  Exercises " were reviewed and [place] was able to demonstrate them prior to the end of the session.  [place] demonstrated good  understanding of the education provided. See EMR under Patient Instructions for exercises provided during therapy sessions.    Assessment     Srinath Mcknight tolerated Physical Therapy  session well with minimal  complaints of pain or discomfort.  Objective findings are improving with pain, strength, endurance.  Srinath Mcknight continues to have difficulty with leg length discrepancy that changes his standing posture.  Therapy exercises were reviewed by revisiting exercises given from previous home exercise program while adding step ups, controlled squats, and functional standing strength work.  Handouts were not issued during today's visit. [place] demonstrated good understanding of new exercises and will continue to progress at home until next follow-up.      [place] is progressing well towards his goals.   Patient prognosis is Good.     Patient will continue to benefit from skilled outpatient physical therapy to address the deficits listed in the problem list box on initial evaluation, provide pt/family education and to maximize patient's level of independence in the home and community environment.     Patient's spiritual, cultural and educational needs considered and pt agreeable to plan of care and goals.     Anticipated Barriers for therapy: co-morbidities, chronicity of condition, and adherence to treatment plan        Short Term Goals:  4 weeks Status  Date Met   PAIN: Pt will report worst pain of 6/10 in order to progress toward max functional ability and improve quality of life. [x] Progressing  [] Met  [] Not Met     STRENGTH: Patient will improve strength to 50% of stated goals, listed in objective measures above, in order to progress towards independence with functional activities. [x] Progressing  [] Met  [] Not Met     POSTURE: Patient will correct postural deviations in  sitting and standing, to decrease pain and promote long term stability.  [x] Progressing  [] Met  [] Not Met     HEP: Patient will demonstrate independence with HEP in order to progress toward functional independence. [x] Progressing  [] Met  [] Not Met     Function: Patient will improve five time sit-to-stand to 18 seconds.  [x] Progressing  [] Met  [] Not Met        Long Term Goals:  8 weeks Status Date Met   PAIN: Pt will report worst pain of 3/10 in order to progress toward max functional ability and improve quality of life [x] Progressing  [] Met  [] Not Met     FUNCTION: Patient will demonstrate improved function as indicated by a score of greater than or equal to 50 out of 100 on FOTO. [x] Progressing  [] Met  [] Not Met     STRENGTH: Patient will improve strength to stated goals, listed in objective measures above, in order to improve functional independence and quality of life.  [x] Progressing  [] Met  [] Not Met     GAIT: Patient will demonstrate normalized gait mechanics with minimal compensation in order to return to PLOF. [x] Progressing  [] Met  [] Not Met     Patient will return to normal ADL's, IADL's, community involvement, recreational activities, and work-related activities with less than or equal to 3/10 pain and maximal function.  [x] Progressing  [] Met  [] Not Met          Plan     Continue Plan of Care (POC) and progress per patient tolerance. See treatment section for details on planned progressions next session.      Gudelia Clark, PT

## 2024-05-20 ENCOUNTER — CLINICAL SUPPORT (OUTPATIENT)
Dept: REHABILITATION | Facility: HOSPITAL | Age: 84
End: 2024-05-20
Attending: NURSE PRACTITIONER
Payer: MEDICARE

## 2024-05-20 DIAGNOSIS — Z74.09 DECREASED STRENGTH, ENDURANCE, AND MOBILITY: ICD-10-CM

## 2024-05-20 DIAGNOSIS — R68.89 DECREASED STRENGTH, ENDURANCE, AND MOBILITY: ICD-10-CM

## 2024-05-20 DIAGNOSIS — R26.89 BALANCE PROBLEM: Primary | ICD-10-CM

## 2024-05-20 DIAGNOSIS — R53.1 DECREASED STRENGTH, ENDURANCE, AND MOBILITY: ICD-10-CM

## 2024-05-20 PROCEDURE — 97110 THERAPEUTIC EXERCISES: CPT | Mod: CQ

## 2024-05-20 PROCEDURE — 97112 NEUROMUSCULAR REEDUCATION: CPT | Mod: CQ

## 2024-05-20 NOTE — PROGRESS NOTES
[facility]   Physical Therapy Treatment Note        Name: [place] [facility] Number: [id number]    Therapy Diagnosis:   Encounter Diagnoses   Name Primary?    Balance problem Yes    Decreased strength, endurance, and mobility        Physician: Jose Roger NP    Visit Date: 5/20/2024    Physician Orders: PT Eval and Treat  Medical Diagnosis from Referral: Gait Instability   Evaluation Date: 5/3/2024  Authorization Period Expiration: 12/31/2024  Plan of Care Expiration: 7/3/2024  Progress Note Due: 6/3/2024  Visit # / Visits authorized: 5/20 + eval    FOTO: 1/3 (last performed on 5/3/2024)     Precautions: Standard and Fall  PTA Visit #: 1/5     Time In: 1530  Time Out: 1630  Total Billable Time: 30 minutes (Billing reflects 1 on 1 treatment time spent with patient)  Total time this session: 60 minutes.     Subjective     Patient reports: feeling about 1/2 way today.     He was compliant with home exercise program.    Response to previous treatment: good    Functional change: no change at this time     Pain: 5/10     Location: knee    Objective      Objective Measures updated at progress report or POC update only unless otherwise noted.       Treatment     [place] received the treatments listed below:       MANUAL THERAPY TECHNIQUES were applied for (0) minutes, including:    Soft tissue mobilization:   right  ITB, quadriceps  Joint mobilizations:   NA  Functional dry needling: DEFERRED    THERAPEUTIC EXERCISES to develop strength, endurance, ROM, flexibility, posture, and core stabilization for (20) minutes including:    Performed Today:   Nu-step, x8 minutes  Gastroc Stretch-bottom of step- standing- 30 seconds x4 each  Hamstring stretch supine 3 x 30 seconds Bilateral Lower extremity   Lower trunk rotation x 3 Minutes  Interventions DEFERRED today                  NEUROMUSCULAR RE-EDUCATION ACTIVITIES to improve Balance, Coordination, Kinesthetic, Sense, Proprioception, and  "Posture for (10) minutes.    The following were included: (increased time for added reps today.     Performed Today:     Straight leg raise 3 x 10 Bilateral Lower extremity   Bridges 3 x 10    Interventions DEFERRED today   Short arc quad 2 x 10 Bilateral Lower extremity    Long Arc Quads 3 x 10 with 3" hold   Alternating marching with ab bracing 3 Minutes   Seated Thoracic Extension over 1/2 foam roll x10         THERAPEUTIC ACTIVITIES to improve dynamic and functional  performance for (0) minutes including:    Performed Today:  Interventions DEFERRED today     Posture stands with arm support, 10" holds  Standing Stair Steps, alt x20  Standing Forward Step Ups @steps, x15 alternating  Controlled Sit Downs to stand, 3x5       Next Session:  Progress resistance        Patient Education and Home Exercises       Home Exercises Provided and Patient Education Provided     Education provided: (with treatment above) minutes  PURPOSE: Patient educated on the impairments noted above and the effects of physical therapy intervention to improve overall condition and QOL.   EXERCISE: Patient was educated on all the above exercise prior/during/after for proper posture, positioning, and execution for safe performance with home exercise program.   STRENGTH: Patient educated on the importance of improved core and extremity strength in order to improve alignment of the spine and extremities with static positions and dynamic movement.   GAIT & BALANCE: Patient educated on the importance of strong core and lower extremity musculature in order to improve both static and dynamic balance, improve gait mechanics, reduce fall risk and improve household and community mobility.   POSTURE: Patient educated on postural awareness to reduce stress and maintain optimal alignment of the spine with static positions and dynamic movement     Written Home Exercises Provided: yes.  Exercises were reviewed and [place] was able to demonstrate them " prior to the end of the session.  [place] demonstrated good  understanding of the education provided. See EMR under Patient Instructions for exercises provided during therapy sessions.    Assessment     Srinath Mcknight tolerated session with rest breaks through out to allow for muscle recovery. He demonstrated lack of full bilateral knee extension with straight leg raise exercise. He was able to increase his reps without pain but was fatigued after this session.    [place] is progressing well towards his goals.   Patient prognosis is Good.     Patient will continue to benefit from skilled outpatient physical therapy to address the deficits listed in the problem list box on initial evaluation, provide pt/family education and to maximize patient's level of independence in the home and community environment.     Patient's spiritual, cultural and educational needs considered and pt agreeable to plan of care and goals.     Anticipated Barriers for therapy: co-morbidities, chronicity of condition, and adherence to treatment plan        Short Term Goals:  4 weeks Status  Date Met   PAIN: Pt will report worst pain of 6/10 in order to progress toward max functional ability and improve quality of life. [x] Progressing  [] Met  [] Not Met     STRENGTH: Patient will improve strength to 50% of stated goals, listed in objective measures above, in order to progress towards independence with functional activities. [x] Progressing  [] Met  [] Not Met     POSTURE: Patient will correct postural deviations in sitting and standing, to decrease pain and promote long term stability.  [x] Progressing  [] Met  [] Not Met     HEP: Patient will demonstrate independence with HEP in order to progress toward functional independence. [x] Progressing  [] Met  [] Not Met     Function: Patient will improve five time sit-to-stand to 18 seconds.  [x] Progressing  [] Met  [] Not Met        Long Term Goals:  8 weeks Status Date Met   PAIN: Pt  will report worst pain of 3/10 in order to progress toward max functional ability and improve quality of life [x] Progressing  [] Met  [] Not Met     FUNCTION: Patient will demonstrate improved function as indicated by a score of greater than or equal to 50 out of 100 on FOTO. [x] Progressing  [] Met  [] Not Met     STRENGTH: Patient will improve strength to stated goals, listed in objective measures above, in order to improve functional independence and quality of life.  [x] Progressing  [] Met  [] Not Met     GAIT: Patient will demonstrate normalized gait mechanics with minimal compensation in order to return to PLOF. [x] Progressing  [] Met  [] Not Met     Patient will return to normal ADL's, IADL's, community involvement, recreational activities, and work-related activities with less than or equal to 3/10 pain and maximal function.  [x] Progressing  [] Met  [] Not Met          Plan     Continue Plan of Care (POC) and progress per patient tolerance. See treatment section for details on planned progressions next session.      Alek Chavez, PTA

## 2024-05-22 ENCOUNTER — CLINICAL SUPPORT (OUTPATIENT)
Dept: REHABILITATION | Facility: HOSPITAL | Age: 84
End: 2024-05-22
Attending: NURSE PRACTITIONER
Payer: MEDICARE

## 2024-05-22 DIAGNOSIS — R68.89 DECREASED STRENGTH, ENDURANCE, AND MOBILITY: ICD-10-CM

## 2024-05-22 DIAGNOSIS — R26.89 BALANCE PROBLEM: Primary | ICD-10-CM

## 2024-05-22 DIAGNOSIS — R53.1 DECREASED STRENGTH, ENDURANCE, AND MOBILITY: ICD-10-CM

## 2024-05-22 DIAGNOSIS — Z74.09 DECREASED STRENGTH, ENDURANCE, AND MOBILITY: ICD-10-CM

## 2024-05-22 PROCEDURE — 97112 NEUROMUSCULAR REEDUCATION: CPT | Performed by: PHYSICAL THERAPIST

## 2024-05-22 PROCEDURE — 97110 THERAPEUTIC EXERCISES: CPT | Performed by: PHYSICAL THERAPIST

## 2024-05-22 NOTE — PROGRESS NOTES
[facility]   Physical Therapy Treatment Note        Name: [place] [facility] Number: [id number]    Therapy Diagnosis:   Encounter Diagnoses   Name Primary?    Balance problem Yes    Decreased strength, endurance, and mobility          Physician: Jose Roger NP    Visit Date: 5/22/2024    Physician Orders: PT Eval and Treat  Medical Diagnosis from Referral: Gait Instability   Evaluation Date: 5/3/2024  Authorization Period Expiration: 12/31/2024  Plan of Care Expiration: 7/3/2024  Progress Note Due: 6/3/2024  Visit # / Visits authorized: 7/20 + eval    FOTO: 1/3 (last performed on 5/3/2024)     Precautions: Standard and Fall  PTA Visit #: 1/5     Time In: 2:17 pm   Time Out: 3:00 pm   Total Billable Time: 42 minutes (Billing reflects 1 on 1 treatment time spent with patient)  Total time this session: 42 minutes.     Subjective     Patient reports: felt good with lift, feels like he needs more.     He was compliant with home exercise program.    Response to previous treatment: good    Functional change: no change at this time     Pain: 5/10     Location: knee    Objective      Objective Measures updated at progress report or POC update only unless otherwise noted.       Treatment     [place] received the treatments listed below:       MANUAL THERAPY TECHNIQUES were applied for (0) minutes, including:    Soft tissue mobilization:   right  ITB, quadriceps  Joint mobilizations:   NA  Functional dry needling: DEFERRED    THERAPEUTIC EXERCISES to develop strength, endurance, ROM, flexibility, posture, and core stabilization for (15) minutes including:    Performed Today:   Nu-step, x 8 minutes, level 3  Gastroc Stretch-bottom of step- standing- 30 seconds x4 each  Tailgaters, 5#, 5 Minutes  Interventions DEFERRED today   Lower trunk rotation x 3 Minutes     Hamstring stretch supine 3 x 30 seconds Bilateral Lower extremity            NEUROMUSCULAR RE-EDUCATION ACTIVITIES to improve  "Balance, Coordination, Kinesthetic, Sense, Proprioception, and Posture for (27) minutes.    The following were included: (increased time for added reps today.     Performed Today:     Leg press, 3 x 10, 95# - increase next visit   Standing marching with ab bracing, x 3 Minutes   Standing hip abduction   Long Arc Quads 3 x 10 with 3" hold, 5# - increased time needed  Interventions DEFERRED today   Short arc quad 2 x 10 Bilateral Lower extremity      Alternating marching with ab bracing 3 Minutes   Seated Thoracic Extension over 1/2 foam roll x10  Straight leg raise 3 x 10 Bilateral Lower extremity   Bridges 3 x 10            THERAPEUTIC ACTIVITIES to improve dynamic and functional  performance for (0) minutes including:    Performed Today:  Interventions DEFERRED today     Posture stands with arm support, 10" holds  Standing Stair Steps, alt x20  Standing Forward Step Ups @steps, x15 alternating  Controlled Sit Downs to stand, 3x5       Next Session:  Progress resistance        Patient Education and Home Exercises       Home Exercises Provided and Patient Education Provided     Education provided: (with treatment above) minutes  PURPOSE: Patient educated on the impairments noted above and the effects of physical therapy intervention to improve overall condition and QOL.   EXERCISE: Patient was educated on all the above exercise prior/during/after for proper posture, positioning, and execution for safe performance with home exercise program.   STRENGTH: Patient educated on the importance of improved core and extremity strength in order to improve alignment of the spine and extremities with static positions and dynamic movement.   GAIT & BALANCE: Patient educated on the importance of strong core and lower extremity musculature in order to improve both static and dynamic balance, improve gait mechanics, reduce fall risk and improve household and community mobility.   POSTURE: Patient educated on postural awareness to " reduce stress and maintain optimal alignment of the spine with static positions and dynamic movement     Written Home Exercises Provided: yes.  Exercises were reviewed and [place] was able to demonstrate them prior to the end of the session.  [place] demonstrated good  understanding of the education provided. See EMR under Patient Instructions for exercises provided during therapy sessions.    Assessment     Srinath Mcknight tolerated session well.  Addition of second heel lift was tolerated well with patient instructed to take out if bothers knee.      Second heel lift put in shoe    [place] is progressing well towards his goals.   Patient prognosis is Good.     Patient will continue to benefit from skilled outpatient physical therapy to address the deficits listed in the problem list box on initial evaluation, provide pt/family education and to maximize patient's level of independence in the home and community environment.     Patient's spiritual, cultural and educational needs considered and pt agreeable to plan of care and goals.     Anticipated Barriers for therapy: co-morbidities, chronicity of condition, and adherence to treatment plan        Short Term Goals:  4 weeks Status  Date Met   PAIN: Pt will report worst pain of 6/10 in order to progress toward max functional ability and improve quality of life. [x] Progressing  [] Met  [] Not Met     STRENGTH: Patient will improve strength to 50% of stated goals, listed in objective measures above, in order to progress towards independence with functional activities. [x] Progressing  [] Met  [] Not Met     POSTURE: Patient will correct postural deviations in sitting and standing, to decrease pain and promote long term stability.  [x] Progressing  [] Met  [] Not Met     HEP: Patient will demonstrate independence with HEP in order to progress toward functional independence. [x] Progressing  [] Met  [] Not Met     Function: Patient will improve five time  sit-to-stand to 18 seconds.  [x] Progressing  [] Met  [] Not Met        Long Term Goals:  8 weeks Status Date Met   PAIN: Pt will report worst pain of 3/10 in order to progress toward max functional ability and improve quality of life [x] Progressing  [] Met  [] Not Met     FUNCTION: Patient will demonstrate improved function as indicated by a score of greater than or equal to 50 out of 100 on FOTO. [x] Progressing  [] Met  [] Not Met     STRENGTH: Patient will improve strength to stated goals, listed in objective measures above, in order to improve functional independence and quality of life.  [x] Progressing  [] Met  [] Not Met     GAIT: Patient will demonstrate normalized gait mechanics with minimal compensation in order to return to PLOF. [x] Progressing  [] Met  [] Not Met     Patient will return to normal ADL's, IADL's, community involvement, recreational activities, and work-related activities with less than or equal to 3/10 pain and maximal function.  [x] Progressing  [] Met  [] Not Met          Plan     Continue Plan of Care (POC) and progress per patient tolerance. See treatment section for details on planned progressions next session.      Jennifer Kelley, PT

## 2024-05-25 NOTE — TELEPHONE ENCOUNTER
Refill Routing Note   Medication(s) are not appropriate for processing by [facility] for the following reason(s):        Required labs outdated: Uric acid    [facility] action(s):  Defer     Requires labs : Yes    Medication Therapy Plan:         Appointments  past 12m or future 3m with PCP    Date Provider   Last Visit   12/18/2023 Yeison Wilder MD   Next Visit   Visit date not found Yeison Wilder MD   ED visits in past 90 days: 0        Note composed:5:48 PM 05/25/2024

## 2024-05-25 NOTE — TELEPHONE ENCOUNTER
No care due was identified.  [facility] Embedded Care Due Messages. Reference number: [id number].   5/25/2024 1:10:04 AM CDT

## 2024-05-27 RX ORDER — ALLOPURINOL 100 MG/1
100 TABLET ORAL DAILY
Qty: 90 TABLET | Refills: 0 | Status: SHIPPED | OUTPATIENT
Start: 2024-05-27

## 2024-05-30 ENCOUNTER — DOCUMENTATION ONLY (OUTPATIENT)
Dept: REHABILITATION | Facility: HOSPITAL | Age: 84
End: 2024-05-30

## 2024-05-30 ENCOUNTER — CLINICAL SUPPORT (OUTPATIENT)
Dept: REHABILITATION | Facility: HOSPITAL | Age: 84
End: 2024-05-30
Attending: NURSE PRACTITIONER
Payer: MEDICARE

## 2024-05-30 DIAGNOSIS — R53.1 DECREASED STRENGTH, ENDURANCE, AND MOBILITY: ICD-10-CM

## 2024-05-30 DIAGNOSIS — R26.89 BALANCE PROBLEM: Primary | ICD-10-CM

## 2024-05-30 DIAGNOSIS — R68.89 DECREASED STRENGTH, ENDURANCE, AND MOBILITY: ICD-10-CM

## 2024-05-30 DIAGNOSIS — Z74.09 DECREASED STRENGTH, ENDURANCE, AND MOBILITY: ICD-10-CM

## 2024-05-30 PROCEDURE — 97112 NEUROMUSCULAR REEDUCATION: CPT | Mod: CQ

## 2024-05-30 PROCEDURE — 97530 THERAPEUTIC ACTIVITIES: CPT | Mod: CQ

## 2024-05-30 NOTE — PROGRESS NOTES
PT/PTA met face to face to discuss pt's treatment plan and progress towards established goals. Pt will be seen by a physical therapist minimally every 6th visit or every 30 days.      Alek Chavez PTA

## 2024-05-30 NOTE — PROGRESS NOTES
[facility]   Physical Therapy Treatment Note        Name: [place] [facility] Number: [id number]    Therapy Diagnosis:   Encounter Diagnoses   Name Primary?    Balance problem Yes    Decreased strength, endurance, and mobility        Physician: Jose Roger NP    Visit Date: 5/30/2024    Physician Orders: PT Eval and Treat  Medical Diagnosis from Referral: Gait Instability   Evaluation Date: 5/3/2024  Authorization Period Expiration: 12/31/2024  Plan of Care Expiration: 7/3/2024  Progress Note Due: 6/3/2024  Visit # / Visits authorized: 7/20 + eval    FOTO: 1/3 (last performed on 5/3/2024)     Precautions: Standard and Fall  PTA Visit #: 1/5     Time In: 1430  Time Out: 1530  Total Billable Time: 30 minutes (Billing reflects 1 on 1 treatment time spent with patient)  Total time this session: 60 minutes.     Subjective     Patient reports: feeling more right knee pain today    He was compliant with home exercise program.    Response to previous treatment: good    Functional change: decreased walking and standing tolerance.     Pain: 8/10     Location: knee    Objective      Objective Measures updated at progress report or POC update only unless otherwise noted.       Treatment     [place] received the treatments listed below:       MANUAL THERAPY TECHNIQUES were applied for (0) minutes, including:    Soft tissue mobilization:   right  ITB, quadriceps  Joint mobilizations:   NA  Functional dry needling: DEFERRED    THERAPEUTIC EXERCISES to develop strength, endurance, ROM, flexibility, posture, and core stabilization for (10) minutes including:    Performed Today:   Nu-step, x 8 minutes  Hamstring stretch supine 3 x 30 seconds Bilateral Lower extremity    Interventions DEFERRED today       Gastroc Stretch-bottom of step- standing- 30 seconds x4 each  Lower trunk rotation x 3 Minutes          NEUROMUSCULAR RE-EDUCATION ACTIVITIES to improve Balance, Coordination, Kinesthetic, Sense,  "Proprioception, and Posture for (10) minutes.    The following were included: (increased time for added reps today.     Performed Today:     Straight leg raise 3 x 10 Bilateral Lower extremity   Bridges 3 x 10       Interventions DEFERRED today   Short arc quad 2 x 10 Bilateral Lower extremity    Long Arc Quads 3 x 10 with 3" hold   Alternating marching with ab bracing 3 Minutes   Seated Thoracic Extension over 1/2 foam roll x10  Posterior pelvic tilt 3 minutes 3 seconds hold        THERAPEUTIC ACTIVITIES to improve dynamic and functional  performance for (10) minutes including:    Performed Today:    March in place x 30 total 2 hands on parallel bars   Heel raises 3 x 10 2 hands on parallel bars   Forward and backward jumping 2 hands on parallel bars 2 x 6 each direction Interventions DEFERRED today     Posture stands with arm support, 10" holds  Standing Stair Steps, alt x 20  Standing Forward Step Ups @steps, x 15 alternating  Controlled Sit Downs to stand, 3 x 5       Next Session:  Progress resistance        Patient Education and Home Exercises       Home Exercises Provided and Patient Education Provided     Education provided: (with treatment above) minutes  PURPOSE: Patient educated on the impairments noted above and the effects of physical therapy intervention to improve overall condition and QOL.   EXERCISE: Patient was educated on all the above exercise prior/during/after for proper posture, positioning, and execution for safe performance with home exercise program.   STRENGTH: Patient educated on the importance of improved core and extremity strength in order to improve alignment of the spine and extremities with static positions and dynamic movement.   GAIT & BALANCE: Patient educated on the importance of strong core and lower extremity musculature in order to improve both static and dynamic balance, improve gait mechanics, reduce fall risk and improve household and community mobility.   POSTURE: Patient " educated on postural awareness to reduce stress and maintain optimal alignment of the spine with static positions and dynamic movement     Written Home Exercises Provided: yes.  Exercises were reviewed and [place] was able to demonstrate them prior to the end of the session.  [place] demonstrated good  understanding of the education provided. See EMR under Patient Instructions for exercises provided during therapy sessions.    Assessment     Srinath Mcknight tolerated session with less rest breaks through out. He demonstrated an improvement in bilateral knee extension with straight leg raise exercise indicating that he is getting stronger.     [place] is progressing well towards his goals.   Patient prognosis is Good.     Patient will continue to benefit from skilled outpatient physical therapy to address the deficits listed in the problem list box on initial evaluation, provide pt/family education and to maximize patient's level of independence in the home and community environment.     Patient's spiritual, cultural and educational needs considered and pt agreeable to plan of care and goals.     Anticipated Barriers for therapy: co-morbidities, chronicity of condition, and adherence to treatment plan        Short Term Goals:  4 weeks Status  Date Met   PAIN: Pt will report worst pain of 6/10 in order to progress toward max functional ability and improve quality of life. [x] Progressing  [] Met  [] Not Met     STRENGTH: Patient will improve strength to 50% of stated goals, listed in objective measures above, in order to progress towards independence with functional activities. [x] Progressing  [] Met  [] Not Met     POSTURE: Patient will correct postural deviations in sitting and standing, to decrease pain and promote long term stability.  [x] Progressing  [] Met  [] Not Met     HEP: Patient will demonstrate independence with HEP in order to progress toward functional independence. [x] Progressing  []  Met  [] Not Met     Function: Patient will improve five time sit-to-stand to 18 seconds.  [x] Progressing  [] Met  [] Not Met        Long Term Goals:  8 weeks Status Date Met   PAIN: Pt will report worst pain of 3/10 in order to progress toward max functional ability and improve quality of life [x] Progressing  [] Met  [] Not Met     FUNCTION: Patient will demonstrate improved function as indicated by a score of greater than or equal to 50 out of 100 on FOTO. [x] Progressing  [] Met  [] Not Met     STRENGTH: Patient will improve strength to stated goals, listed in objective measures above, in order to improve functional independence and quality of life.  [x] Progressing  [] Met  [] Not Met     GAIT: Patient will demonstrate normalized gait mechanics with minimal compensation in order to return to PLOF. [x] Progressing  [] Met  [] Not Met     Patient will return to normal ADL's, IADL's, community involvement, recreational activities, and work-related activities with less than or equal to 3/10 pain and maximal function.  [x] Progressing  [] Met  [] Not Met          Plan     Continue Plan of Care (POC) and progress per patient tolerance. See treatment section for details on planned progressions next session.      Alek Chavez, PTA

## 2024-06-03 ENCOUNTER — OFFICE VISIT (OUTPATIENT)
Dept: UROLOGY | Facility: CLINIC | Age: 84
End: 2024-06-03
Payer: MEDICARE

## 2024-06-03 VITALS
HEART RATE: 70 BPM | DIASTOLIC BLOOD PRESSURE: 75 MMHG | BODY MASS INDEX: 30.05 KG/M2 | SYSTOLIC BLOOD PRESSURE: 139 MMHG | WEIGHT: 221.56 LBS | RESPIRATION RATE: 16 BRPM

## 2024-06-03 DIAGNOSIS — N39.41 URGE INCONTINENCE OF URINE: Primary | ICD-10-CM

## 2024-06-03 PROCEDURE — 3078F DIAST BP <80 MM HG: CPT | Mod: HCNC,CPTII,S$GLB, | Performed by: NURSE PRACTITIONER

## 2024-06-03 PROCEDURE — 1159F MED LIST DOCD IN RCRD: CPT | Mod: HCNC,CPTII,S$GLB, | Performed by: NURSE PRACTITIONER

## 2024-06-03 PROCEDURE — 3075F SYST BP GE 130 - 139MM HG: CPT | Mod: HCNC,CPTII,S$GLB, | Performed by: NURSE PRACTITIONER

## 2024-06-03 PROCEDURE — 99999 PR PBB SHADOW E&M-EST. PATIENT-LVL IV: CPT | Mod: PBBFAC,HCNC,, | Performed by: NURSE PRACTITIONER

## 2024-06-03 PROCEDURE — 99214 OFFICE O/P EST MOD 30 MIN: CPT | Mod: HCNC,S$GLB,, | Performed by: NURSE PRACTITIONER

## 2024-06-03 PROCEDURE — 3288F FALL RISK ASSESSMENT DOCD: CPT | Mod: HCNC,CPTII,S$GLB, | Performed by: NURSE PRACTITIONER

## 2024-06-03 PROCEDURE — 1101F PT FALLS ASSESS-DOCD LE1/YR: CPT | Mod: HCNC,CPTII,S$GLB, | Performed by: NURSE PRACTITIONER

## 2024-06-03 RX ORDER — MIRABEGRON 50 MG/1
50 TABLET, EXTENDED RELEASE ORAL DAILY
Qty: 30 TABLET | Refills: 11 | Status: SHIPPED | OUTPATIENT
Start: 2024-06-03 | End: 2025-06-03

## 2024-06-03 NOTE — PROGRESS NOTES
Chief Complaint:     Urinary frequency    HPI:    Patient is presenting as a follow-up to urgent care.  States that he was seen in urgent care secondary to urinary tract infection, positive urine culture indicated E coli.  Patient is uncircumcised.  Urine in clinic indicates trace leukocytes, all other parameters are negative.  PVR is 40 mL.  Reports that he return to urgent care with complaints of frequency and was prescribed Detrol 5 mg 3 times a day.  No BPH meds, tamsulosin made him feel lightheaded.  Reports that stream is strong, however, if he has not near a bathroom he will have urge incontinence.  Drinks very little water throughout the day.  11/13/2023  Patient 82-year-old male that is presenting as a follow-up for his annual exam.  Patient states that he was taken off tamsulosin secondary to feeling lightheaded.  Reports that urinary stream is strong and nocturia is once nightly.  Urine in clinic is negative and PVR was 21 mL.  No gross hematuria.  No previous elevated PSAs or prostate biopsies.  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  Recent renal ultrasound indicated a stable, simple cyst.  11/09/2022  Patient is a 81-year-old male that is presenting for his annual exam.  Patient is currently on tamsulosin, reports all BPH symptoms are resolved.  Urine in clinic is negative.  PVR is 14 mL.  Recent PSA was normal, 0.77.  No past elevated PSAs or prostate biopsies.  Patient has erectile dysfunction and reports that TriMix is working well for him.  Patient has been followed for a right renal cyst.  Had renal ultrasound today, results are pending.  Denies gross hematuria or flank pain.  11/09/2021  Patient is a 80-year-old male that is presenting to review renal ultrasound.  Patient has a history of benign, simple renal cyst.  Renal ultrasound was stable, no change to right kidney upper pole cyst  measuring 1.4 cm.  No hydronephrosis or nephrolithiasis.  Patient is due for PSA level.   Wants to discuss possible treatments for ED. No BPH meds. Nocturia once nightly, no daytime LUTS.    Allergies:  Sulfa (sulfonamide antibiotics)    Medications:  has a current medication list which includes the following prescription(s): allopurinol, bisacodyl, blood glucose control, low, true metrix air glucose meter, cetirizine, clonazepam, clotrimazole, cyanocobalamin (vitamin b-12), diclofenac sodium, garlic extract, jardiance, lactulose, losartan, lubiprostone, melatonin, metformin, metoprolol succinate, mometasone 0.1%, multivitamin, oxybutynin, pramipexole, pravastatin, pregabalin, sildenafil, true metrix glucose test strip, and trueplus lancets.    Review of Systems:  General: No fever, chills, fatigability, or weight loss.  Skin: No rashes, itching, or changes in color or texture of skin.  Chest: Denies DAMON, cyanosis, wheezing, cough, and sputum production.  Abdomen: Appetite fine. No weight loss. Denies diarrhea, abdominal pain, hematemesis, or blood in stool.  Musculoskeletal: No joint stiffness or swelling. Denies back pain.  : As above.  All other review of systems negative.    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    PSH:   has a past surgical history that includes Rotator cuff  repair (Left, 2006); Shoulder arthroscopy w/ rotator cuff repair (Right, 2009); Laminectomy (07/22/2016); Hernia repair (Bilateral, 04/18/2017); Joint replacement (Left, 10/09/2017); Back surgery (2019); lumbar foraminotomy (03/2018); left elbow (10/2017); Revision total hip arthroplasty (Left, 9/11/2018); Esophagogastroduodenoscopy (N/A, 6/30/2020); Colonoscopy (N/A, 6/30/2020); and Injection of joint (Right, 4/14/2023).    FamHx: family history includes Cancer (age of onset: 50) in his brother; Diabetes in his father, mother, and sister; Drug abuse in his brother; Heart disease in his father and mother; Hypertension in his father and mother; Stroke in his father.    SocHx:  reports that he has never smoked. He has never been exposed to tobacco smoke. He has never used smokeless tobacco. He reports that he does not currently use alcohol. He reports that he does not use drugs.      Physical Exam:  General: A&Ox3, no apparent distress, no deformities  Neck: No masses, normal thyroid  Lungs: normal inspiration, no use of accessory muscles  Heart: normal pulse, no arrhythmias  Abdomen: Soft, NT, ND, no masses, no hernias, no hepatosplenomegaly  Lymphatic: Neck and groin nodes negative  Skin: The skin is warm and dry. No jaundice.    Labs/Studies:   See HPI    Impression/Plan:    Was educated on behavior modifications needed, in uncircumcised males, to decrease the risk of E coli cystitis.  Patient to discontinue Detrol and Myrbetriq was sent to his pharmacy.  Return to clinic in 6-8 weeks for re-evaluation.  Patient is aware of the need to increase water intake to 6 glasses a day.

## 2024-06-04 ENCOUNTER — CLINICAL SUPPORT (OUTPATIENT)
Dept: REHABILITATION | Facility: HOSPITAL | Age: 84
End: 2024-06-04
Payer: MEDICARE

## 2024-06-04 DIAGNOSIS — R26.89 BALANCE PROBLEM: Primary | ICD-10-CM

## 2024-06-04 DIAGNOSIS — R53.1 DECREASED STRENGTH, ENDURANCE, AND MOBILITY: ICD-10-CM

## 2024-06-04 DIAGNOSIS — R68.89 DECREASED STRENGTH, ENDURANCE, AND MOBILITY: ICD-10-CM

## 2024-06-04 DIAGNOSIS — Z74.09 DECREASED STRENGTH, ENDURANCE, AND MOBILITY: ICD-10-CM

## 2024-06-04 PROCEDURE — 97110 THERAPEUTIC EXERCISES: CPT | Mod: HCNC,CQ

## 2024-06-04 PROCEDURE — 97112 NEUROMUSCULAR REEDUCATION: CPT | Mod: HCNC,CQ

## 2024-06-04 NOTE — PROGRESS NOTES
[facility]   Physical Therapy Treatment Note + Progress Report       Name: Srinath Mcknight  [facility] Number: [id number]    Therapy Diagnosis:   Encounter Diagnoses   Name Primary?    Balance problem Yes    Decreased strength, endurance, and mobility        Physician: Jose Roger NP    Visit Date: 6/4/2024    Physician Orders: PT Eval and Treat  Medical Diagnosis from Referral: Gait Instability   Evaluation Date: 5/3/2024  Authorization Period Expiration: 12/31/2024  Plan of Care Expiration: 7/3/2024  Progress Note Due: 7/3/2024  Visit # / Visits authorized: 8/20 + eval    FOTO: 2/3 (last performed on 6/4/2024)         Precautions: Standard and Fall  PTA Visit #: 1/5     Time In: 1300  Time Out: 1400  Total Billable Time: 55 minutes (Billing reflects 1 on 1 treatment time spent with patient)       Subjective     Patient reports: right knee pain is not getting better. Feels like he has more range of motion. Pain in his right quad. Riding a pedal machine 30 minutes 2x/day.     He was compliant with home exercise program.    Response to previous treatment: good    Functional change: decreased walking and standing tolerance. Walking and balancing better.     Pain: 8/10     Location: knee    Objective      Objective Measures updated at progress report or POC update only unless otherwise noted.       STRENGTH:   L/E MMT Right  (spine) Left Pain/Dysfunction with Movement Goal Right 6/4/2024 Left 6/4/2024   Hip Flexion  4-/5 3+/5   4+/5 B 4/5 4+/5   Hip Extension  3/5 3/5 Tested in seated position 4+/5 B 4+/5 4+/5   Hip Abduction  4/5 4/5 Tested in seated position 4+/5 B 4+/5 4+/5   Knee Extension 4/5 4+/5   5/5 B 5/5 5/5   Knee Flexion 4/5 4/5 Tested in seated position 5/5 B 4+/5 4+/5   Ankle DF 3+/5 4/5   5/5 B 5/5 5/5           Treatment     [place] received the treatments listed below:       MANUAL THERAPY TECHNIQUES were applied for (0) minutes, including:    Soft tissue mobilization:  "  right  ITB, quadriceps  Joint mobilizations:   NA  Functional dry needling: DEFERRED    THERAPEUTIC EXERCISES to develop strength, endurance, ROM, flexibility, posture, and core stabilization for (35) minutes including: obtain objective measurements and completing FOTO    Performed Today:   Nu-step, x 5 minutes  Hamstring stretch supine 3 x 30 seconds Bilateral Lower extremity   Lower trunk rotation x 3 Minutes  Interventions DEFERRED today       Gastroc Stretch-bottom of step- standing- 30 seconds x4 each           NEUROMUSCULAR RE-EDUCATION ACTIVITIES to improve Balance, Coordination, Kinesthetic, Sense, Proprioception, and Posture for (20) minutes.    The following were included: (increased time for added reps today.     Performed Today:     Straight leg raise 3 x 10 Bilateral Lower extremity   Bridges 3 x 10    Long Arc Quads 3 x 10 with 3" hold    Interventions DEFERRED today   Short arc quad 2 x 10 Bilateral Lower extremity    Alternating marching with ab bracing 3 Minutes   Seated Thoracic Extension over 1/2 foam roll x10  Posterior pelvic tilt 3 minutes 3 seconds hold        THERAPEUTIC ACTIVITIES to improve dynamic and functional  performance for (0) minutes including:    Performed Today:     Interventions DEFERRED today     Posture stands with arm support, 10" holds  Standing Stair Steps, alt x 20  Standing Forward Step Ups @steps, x 15 alternating  Controlled Sit Downs to stand, 3 x 5  March in place x 30 total 2 hands on parallel bars   Heel raises 3 x 10 2 hands on parallel bars   Forward and backward jumping 2 hands on parallel bars 2 x 6 each direction     Next Session:  Progress resistance        Patient Education and Home Exercises       Home Exercises Provided and Patient Education Provided     Education provided: (with treatment above) minutes  PURPOSE: Patient educated on the impairments noted above and the effects of physical therapy intervention to improve overall condition and QOL. "   EXERCISE: Patient was educated on all the above exercise prior/during/after for proper posture, positioning, and execution for safe performance with home exercise program.   STRENGTH: Patient educated on the importance of improved core and extremity strength in order to improve alignment of the spine and extremities with static positions and dynamic movement.   GAIT & BALANCE: Patient educated on the importance of strong core and lower extremity musculature in order to improve both static and dynamic balance, improve gait mechanics, reduce fall risk and improve household and community mobility.   POSTURE: Patient educated on postural awareness to reduce stress and maintain optimal alignment of the spine with static positions and dynamic movement     Written Home Exercises Provided: yes.  Exercises were reviewed and [place] was able to demonstrate them prior to the end of the session.  [place] demonstrated good  understanding of the education provided. See EMR under Patient Instructions for exercises provided during therapy sessions.    Assessment     Srinath Mcknight reports he is in general more active since starting physical therapy. He is able to show improvement in his strength in the following: left hip flexion, and bilateral hip extension, abduction, quadriceps, and dorsiflexion. He is able to initiate walking with less of a pause after standing and is able to perform bilateral rolling on mat with less labored effort. He may benefit from additional physical therapy to address his remaining deficits.     [place] is progressing well towards his goals.   Patient prognosis is Good.     Patient will continue to benefit from skilled outpatient physical therapy to address the deficits listed in the problem list box on initial evaluation, provide pt/family education and to maximize patient's level of independence in the home and community environment.     Patient's spiritual, cultural and educational needs  considered and pt agreeable to plan of care and goals.     Anticipated Barriers for therapy: co-morbidities, chronicity of condition, and adherence to treatment plan        Short Term Goals:  4 weeks Status  Date Met   PAIN: Pt will report worst pain of 6/10 in order to progress toward max functional ability and improve quality of life. [x] Progressing  [] Met  [] Not Met     STRENGTH: Patient will improve strength to 50% of stated goals, listed in objective measures above, in order to progress towards independence with functional activities. [] Progressing  [] Met  [] Not Met  6/4/2024   POSTURE: Patient will correct postural deviations in sitting and standing, to decrease pain and promote long term stability.  [x] Progressing  [] Met  [] Not Met     HEP: Patient will demonstrate independence with HEP in order to progress toward functional independence. [x] Progressing  [] Met  [] Not Met     Function: Patient will improve five time sit-to-stand to 18 seconds.  [x] Progressing  [] Met  [] Not Met        Long Term Goals:  8 weeks Status Date Met   PAIN: Pt will report worst pain of 3/10 in order to progress toward max functional ability and improve quality of life [x] Progressing  [] Met  [] Not Met     FUNCTION: Patient will demonstrate improved function as indicated by a score of greater than or equal to 50 out of 100 on FOTO. [x] Progressing  [] Met  [] Not Met     STRENGTH: Patient will improve strength to stated goals, listed in objective measures above, in order to improve functional independence and quality of life.  [x] Progressing  [] Met  [] Not Met     GAIT: Patient will demonstrate normalized gait mechanics with minimal compensation in order to return to PLOF. [x] Progressing  [] Met  [] Not Met     Patient will return to normal ADL's, IADL's, community involvement, recreational activities, and work-related activities with less than or equal to 3/10 pain and maximal function.  [x] Progressing  [] Met  []  Not Met          Plan     Continue Plan of Care (POC) and progress per patient tolerance. See treatment section for details on planned progressions next session.      Alek Chavez, PTA

## 2024-06-04 NOTE — PLAN OF CARE
[facility]   Physical Therapy Treatment Note + Progress Report       Name: Srinath Mcknight  [facility] Number: [id number]    Therapy Diagnosis:   Encounter Diagnoses   Name Primary?    Balance problem Yes    Decreased strength, endurance, and mobility        Physician: Jose Roger NP    Visit Date: 6/4/2024    Physician Orders: PT Eval and Treat  Medical Diagnosis from Referral: Gait Instability   Evaluation Date: 5/3/2024  Authorization Period Expiration: 12/31/2024  Plan of Care Expiration: 7/3/2024  Progress Note Due: 7/3/2024  Visit # / Visits authorized: 8/20 + eval    FOTO: 2/3 (last performed on 6/4/2024)         Precautions: Standard and Fall  PTA Visit #: 1/5     Time In: 1300  Time Out: 1400  Total Billable Time: 55 minutes (Billing reflects 1 on 1 treatment time spent with patient)       Subjective     Patient reports: right knee pain is not getting better. Feels like he has more range of motion. Pain in his right quad. Riding a pedal machine 30 minutes 2x/day.     He was compliant with home exercise program.    Response to previous treatment: good    Functional change: decreased walking and standing tolerance. Walking and balancing better.     Pain: 8/10     Location: knee    Objective      Objective Measures updated at progress report or POC update only unless otherwise noted.       STRENGTH:   L/E MMT Right  (spine) Left Pain/Dysfunction with Movement Goal Right 6/4/2024 Left 6/4/2024   Hip Flexion  4-/5 3+/5   4+/5 B 4/5 4+/5   Hip Extension  3/5 3/5 Tested in seated position 4+/5 B 4+/5 4+/5   Hip Abduction  4/5 4/5 Tested in seated position 4+/5 B 4+/5 4+/5   Knee Extension 4/5 4+/5   5/5 B 5/5 5/5   Knee Flexion 4/5 4/5 Tested in seated position 5/5 B 4+/5 4+/5   Ankle DF 3+/5 4/5   5/5 B 5/5 5/5           Treatment     [place] received the treatments listed below:       MANUAL THERAPY TECHNIQUES were applied for (0) minutes, including:    Soft tissue mobilization:  "  right  ITB, quadriceps  Joint mobilizations:   NA  Functional dry needling: DEFERRED    THERAPEUTIC EXERCISES to develop strength, endurance, ROM, flexibility, posture, and core stabilization for (35) minutes including: obtain objective measurements and completing FOTO    Performed Today:   Nu-step, x 5 minutes  Hamstring stretch supine 3 x 30 seconds Bilateral Lower extremity   Lower trunk rotation x 3 Minutes  Interventions DEFERRED today       Gastroc Stretch-bottom of step- standing- 30 seconds x4 each           NEUROMUSCULAR RE-EDUCATION ACTIVITIES to improve Balance, Coordination, Kinesthetic, Sense, Proprioception, and Posture for (20) minutes.    The following were included: (increased time for added reps today.     Performed Today:     Straight leg raise 3 x 10 Bilateral Lower extremity   Bridges 3 x 10    Long Arc Quads 3 x 10 with 3" hold    Interventions DEFERRED today   Short arc quad 2 x 10 Bilateral Lower extremity    Alternating marching with ab bracing 3 Minutes   Seated Thoracic Extension over 1/2 foam roll x10  Posterior pelvic tilt 3 minutes 3 seconds hold        THERAPEUTIC ACTIVITIES to improve dynamic and functional  performance for (0) minutes including:    Performed Today:     Interventions DEFERRED today     Posture stands with arm support, 10" holds  Standing Stair Steps, alt x 20  Standing Forward Step Ups @steps, x 15 alternating  Controlled Sit Downs to stand, 3 x 5  March in place x 30 total 2 hands on parallel bars   Heel raises 3 x 10 2 hands on parallel bars   Forward and backward jumping 2 hands on parallel bars 2 x 6 each direction     Next Session:  Progress resistance        Patient Education and Home Exercises       Home Exercises Provided and Patient Education Provided     Education provided: (with treatment above) minutes  PURPOSE: Patient educated on the impairments noted above and the effects of physical therapy intervention to improve overall condition and QOL. "   EXERCISE: Patient was educated on all the above exercise prior/during/after for proper posture, positioning, and execution for safe performance with home exercise program.   STRENGTH: Patient educated on the importance of improved core and extremity strength in order to improve alignment of the spine and extremities with static positions and dynamic movement.   GAIT & BALANCE: Patient educated on the importance of strong core and lower extremity musculature in order to improve both static and dynamic balance, improve gait mechanics, reduce fall risk and improve household and community mobility.   POSTURE: Patient educated on postural awareness to reduce stress and maintain optimal alignment of the spine with static positions and dynamic movement     Written Home Exercises Provided: yes.  Exercises were reviewed and [place] was able to demonstrate them prior to the end of the session.  [place] demonstrated good  understanding of the education provided. See EMR under Patient Instructions for exercises provided during therapy sessions.    Assessment     Srinath Mcknight reports he is in general more active since starting physical therapy. He is able to show improvement in his strength in the following: left hip flexion, and bilateral hip extension, abduction, quadriceps, and dorsiflexion. He is able to initiate walking with less of a pause after standing and is able to perform bilateral rolling on mat with less labored effort. He may benefit from additional physical therapy to address his remaining deficits.     [place] is progressing well towards his goals.   Patient prognosis is Good.     Patient will continue to benefit from skilled outpatient physical therapy to address the deficits listed in the problem list box on initial evaluation, provide pt/family education and to maximize patient's level of independence in the home and community environment.     Patient's spiritual, cultural and educational needs  considered and pt agreeable to plan of care and goals.     Anticipated Barriers for therapy: co-morbidities, chronicity of condition, and adherence to treatment plan        Short Term Goals:  4 weeks Status  Date Met   PAIN: Pt will report worst pain of 6/10 in order to progress toward max functional ability and improve quality of life. [x] Progressing  [] Met  [] Not Met     STRENGTH: Patient will improve strength to 50% of stated goals, listed in objective measures above, in order to progress towards independence with functional activities. [] Progressing  [] Met  [] Not Met  6/4/2024   POSTURE: Patient will correct postural deviations in sitting and standing, to decrease pain and promote long term stability.  [x] Progressing  [] Met  [] Not Met     HEP: Patient will demonstrate independence with HEP in order to progress toward functional independence. [x] Progressing  [] Met  [] Not Met     Function: Patient will improve five time sit-to-stand to 18 seconds.  [x] Progressing  [] Met  [] Not Met        Long Term Goals:  8 weeks Status Date Met   PAIN: Pt will report worst pain of 3/10 in order to progress toward max functional ability and improve quality of life [x] Progressing  [] Met  [] Not Met     FUNCTION: Patient will demonstrate improved function as indicated by a score of greater than or equal to 50 out of 100 on FOTO. [x] Progressing  [] Met  [] Not Met     STRENGTH: Patient will improve strength to stated goals, listed in objective measures above, in order to improve functional independence and quality of life.  [x] Progressing  [] Met  [] Not Met     GAIT: Patient will demonstrate normalized gait mechanics with minimal compensation in order to return to PLOF. [x] Progressing  [] Met  [] Not Met     Patient will return to normal ADL's, IADL's, community involvement, recreational activities, and work-related activities with less than or equal to 3/10 pain and maximal function.  [x] Progressing  [] Met  []  Not Met          Plan     Continue Plan of Care (POC) and progress per patient tolerance. See treatment section for details on planned progressions next session.      Alek Chavez, PTA

## 2024-06-10 ENCOUNTER — CLINICAL SUPPORT (OUTPATIENT)
Dept: REHABILITATION | Facility: HOSPITAL | Age: 84
End: 2024-06-10
Payer: MEDICARE

## 2024-06-10 DIAGNOSIS — R53.1 DECREASED STRENGTH, ENDURANCE, AND MOBILITY: ICD-10-CM

## 2024-06-10 DIAGNOSIS — R68.89 DECREASED STRENGTH, ENDURANCE, AND MOBILITY: ICD-10-CM

## 2024-06-10 DIAGNOSIS — R26.89 BALANCE PROBLEM: Primary | ICD-10-CM

## 2024-06-10 DIAGNOSIS — Z74.09 DECREASED STRENGTH, ENDURANCE, AND MOBILITY: ICD-10-CM

## 2024-06-10 PROCEDURE — 97110 THERAPEUTIC EXERCISES: CPT | Mod: HCNC | Performed by: PHYSICAL THERAPIST

## 2024-06-10 PROCEDURE — 97112 NEUROMUSCULAR REEDUCATION: CPT | Mod: HCNC | Performed by: PHYSICAL THERAPIST

## 2024-06-10 PROCEDURE — 97530 THERAPEUTIC ACTIVITIES: CPT | Mod: HCNC | Performed by: PHYSICAL THERAPIST

## 2024-06-10 RX ORDER — PREGABALIN 75 MG/1
75-150 CAPSULE ORAL NIGHTLY
Qty: 180 CAPSULE | Refills: 1 | Status: SHIPPED | OUTPATIENT
Start: 2024-06-10

## 2024-06-10 NOTE — TELEPHONE ENCOUNTER
Pt is requesting for a refill of: pregabalin (LYRICA) 75 MG capsule   Last filed:6/11/23  Last encounter: 8/16/23  Up coming apt: 8/01/24   Pharmacy:[facility] [place]   Is this something you can do?

## 2024-06-10 NOTE — PROGRESS NOTES
[facility]   Physical Therapy Treatment Note        Name: [place] [facility] Number: [id number]    Therapy Diagnosis:   Encounter Diagnoses   Name Primary?    Balance problem Yes    Decreased strength, endurance, and mobility        Physician: Jose Roger NP    Visit Date: 6/10/2024    Physician Orders: PT Eval and Treat  Medical Diagnosis from Referral: Gait Instability   Evaluation Date: 5/3/2024  Authorization Period Expiration: 12/31/2024  Plan of Care Expiration: 7/3/2024  Progress Note Due: 7/3/2024  Visit # / Visits authorized: 9/20 + eval    FOTO: 2/3 (last performed on 6/4/2024)         Precautions: Standard and Fall  PTA Visit #: 1/5     Time In: 1:35 pm   Time Out: 2:30 pm   Total Billable Time: 53 minutes (Billing reflects 1 on 1 treatment time spent with patient)       Subjective     Patient reports: the knee seems to be doing better and he did a lot of walking over the weekend.  But feels like he is having some pain in the right hip now.       He was compliant with home exercise program.    Response to previous treatment: good    Functional change: decreased walking and standing tolerance. Walking and balancing better.     Pain: 6/10     Location: knee    Objective      Objective Measures updated at progress report or POC update only unless otherwise noted.       Treatment     [place] received the treatments listed below:     MANUAL THERAPY TECHNIQUES were applied for (0) minutes, including:    Soft tissue mobilization:   right  ITB, quadriceps  Joint mobilizations:   NA  Functional dry needling: DEFERRED    THERAPEUTIC EXERCISES to develop strength, endurance, ROM, flexibility, posture, and core stabilization for (14) minutes including: obtain objective measurements and completing FOTO    Performed Today:   Bike, x 5 minutes  Hamstring stretch supine 3 x 30 seconds Bilateral Lower extremity   Tailgaters, 5 Minutes 5#  Interventions DEFERRED today       Gastroc  "Stretch-bottom of step- standing- 30 seconds x4 each           NEUROMUSCULAR RE-EDUCATION ACTIVITIES to improve Balance, Coordination, Kinesthetic, Sense, Proprioception, and Posture for (15) minutes.    The following were included: (increased time for added reps today.     Performed Today:     Straight leg raise 3 x 10 Bilateral Lower extremity   Bridges 3 x 10    Long Arc Quads 3 x 10 with 3" hold    Interventions DEFERRED today   Short arc quad 2 x 10 Bilateral Lower extremity    Alternating marching with ab bracing 3 Minutes   Seated Thoracic Extension over 1/2 foam roll x10  Posterior pelvic tilt 3 minutes 3 seconds hold        THERAPEUTIC ACTIVITIES to improve dynamic and functional  performance for (24) minutes including:    Performed Today:    Leg Press, 50x 105#   Knee extension matrix, 2 x 10, 27#   Heel raises 3 x 10 2 hands on parallel bars   March in place x 30 total 2 hands on parallel bars    Interventions DEFERRED today     Posture stands with arm support, 10" holds  Standing Stair Steps, alt x 20  Standing Forward Step Ups @steps, x 15 alternating  Controlled Sit Downs to stand, 3 x 5  Forward and backward jumping 2 hands on parallel bars 2 x 6 each direction     Next Session:  Progress resistance        Patient Education and Home Exercises       Home Exercises Provided and Patient Education Provided     Education provided: (with treatment above) minutes  PURPOSE: Patient educated on the impairments noted above and the effects of physical therapy intervention to improve overall condition and QOL.   EXERCISE: Patient was educated on all the above exercise prior/during/after for proper posture, positioning, and execution for safe performance with home exercise program.   STRENGTH: Patient educated on the importance of improved core and extremity strength in order to improve alignment of the spine and extremities with static positions and dynamic movement.   GAIT & BALANCE: Patient educated on the " importance of strong core and lower extremity musculature in order to improve both static and dynamic balance, improve gait mechanics, reduce fall risk and improve household and community mobility.   POSTURE: Patient educated on postural awareness to reduce stress and maintain optimal alignment of the spine with static positions and dynamic movement     Written Home Exercises Provided: yes.  Exercises were reviewed and [place] was able to demonstrate them prior to the end of the session.  [place] demonstrated good  understanding of the education provided. See EMR under Patient Instructions for exercises provided during therapy sessions.    Assessment     Srinath Mcknight reports that his pain is more in the hips today after walking more over the weekend.  He was able to increase resistance with exercise today with more focus on increasing strength for the knee.     [place] is progressing well towards his goals.   Patient prognosis is Good.     Patient will continue to benefit from skilled outpatient physical therapy to address the deficits listed in the problem list box on initial evaluation, provide pt/family education and to maximize patient's level of independence in the home and community environment.     Patient's spiritual, cultural and educational needs considered and pt agreeable to plan of care and goals.     Anticipated Barriers for therapy: co-morbidities, chronicity of condition, and adherence to treatment plan        Short Term Goals:  4 weeks Status  Date Met   PAIN: Pt will report worst pain of 6/10 in order to progress toward max functional ability and improve quality of life. [x] Progressing  [] Met  [] Not Met     STRENGTH: Patient will improve strength to 50% of stated goals, listed in objective measures above, in order to progress towards independence with functional activities. [] Progressing  [] Met  [] Not Met  6/4/2024   POSTURE: Patient will correct postural deviations in sitting  and standing, to decrease pain and promote long term stability.  [x] Progressing  [] Met  [] Not Met     HEP: Patient will demonstrate independence with HEP in order to progress toward functional independence. [x] Progressing  [] Met  [] Not Met     Function: Patient will improve five time sit-to-stand to 18 seconds.  [x] Progressing  [] Met  [] Not Met        Long Term Goals:  8 weeks Status Date Met   PAIN: Pt will report worst pain of 3/10 in order to progress toward max functional ability and improve quality of life [x] Progressing  [] Met  [] Not Met     FUNCTION: Patient will demonstrate improved function as indicated by a score of greater than or equal to 50 out of 100 on FOTO. [x] Progressing  [] Met  [] Not Met     STRENGTH: Patient will improve strength to stated goals, listed in objective measures above, in order to improve functional independence and quality of life.  [x] Progressing  [] Met  [] Not Met     GAIT: Patient will demonstrate normalized gait mechanics with minimal compensation in order to return to PLOF. [x] Progressing  [] Met  [] Not Met     Patient will return to normal ADL's, IADL's, community involvement, recreational activities, and work-related activities with less than or equal to 3/10 pain and maximal function.  [x] Progressing  [] Met  [] Not Met          Plan     Continue Plan of Care (POC) and progress per patient tolerance. See treatment section for details on planned progressions next session.      Jennifer Kelley, PT

## 2024-06-14 ENCOUNTER — CLINICAL SUPPORT (OUTPATIENT)
Dept: REHABILITATION | Facility: HOSPITAL | Age: 84
End: 2024-06-14
Payer: MEDICARE

## 2024-06-14 DIAGNOSIS — R26.89 BALANCE PROBLEM: Primary | ICD-10-CM

## 2024-06-14 DIAGNOSIS — R68.89 DECREASED STRENGTH, ENDURANCE, AND MOBILITY: ICD-10-CM

## 2024-06-14 DIAGNOSIS — R53.1 DECREASED STRENGTH, ENDURANCE, AND MOBILITY: ICD-10-CM

## 2024-06-14 DIAGNOSIS — Z74.09 DECREASED STRENGTH, ENDURANCE, AND MOBILITY: ICD-10-CM

## 2024-06-14 PROCEDURE — 97530 THERAPEUTIC ACTIVITIES: CPT | Mod: HCNC,CQ

## 2024-06-14 PROCEDURE — 97112 NEUROMUSCULAR REEDUCATION: CPT | Mod: HCNC,CQ

## 2024-06-14 PROCEDURE — 97110 THERAPEUTIC EXERCISES: CPT | Mod: HCNC,CQ

## 2024-06-14 NOTE — PROGRESS NOTES
[facility]   Physical Therapy Treatment Note        Name: [place] [facility] Number: [id number]    Therapy Diagnosis:   Encounter Diagnoses   Name Primary?    Balance problem Yes    Decreased strength, endurance, and mobility        Physician: Jose Roger NP    Visit Date: 6/14/2024    Physician Orders: PT Eval and Treat  Medical Diagnosis from Referral: Gait Instability   Evaluation Date: 5/3/2024  Authorization Period Expiration: 12/31/2024  Plan of Care Expiration: 7/3/2024  Progress Note Due: 7/3/2024  Visit # / Visits authorized: 10/20 + eval    FOTO: 2/3 (last performed on 6/4/2024)         Precautions: Standard and Fall  PTA Visit #: 2/5     Time In: 1500   Time Out: 1600   Total Billable Time: 55 minutes (Billing reflects 1 on 1 treatment time spent with patient)       Subjective     Patient reports: Has some low back pain with walking      He was compliant with home exercise program.    Response to previous treatment: good    Functional change: decreased walking and standing tolerance. Walking and balancing better.     Pain: 3/10     Location: knee    Objective      Objective Measures updated at progress report or POC update only unless otherwise noted.       Treatment     [place] received the treatments listed below:     MANUAL THERAPY TECHNIQUES were applied for (0) minutes, including:    Soft tissue mobilization:   right  ITB, quadriceps  Joint mobilizations:   NA  Functional dry needling: DEFERRED    THERAPEUTIC EXERCISES to develop strength, endurance, ROM, flexibility, posture, and core stabilization for (10) minutes including:     Performed Today:   Tailgaters, 3 Minutes 3 pound   Nustep 7 minutes level 4 Interventions DEFERRED today       Gastroc Stretch-bottom of step- standing- 30 seconds x4 each  Bike, x 5 minutes  Hamstring stretch supine 3 x 30 seconds Bilateral Lower extremity        NEUROMUSCULAR RE-EDUCATION ACTIVITIES to improve Balance, Coordination,  "Kinesthetic, Sense, Proprioception, and Posture for (16) minutes.    The following were included: (increased time for added reps today.     Performed Today:     Hip extension standing 3 x 10 2 hands on parallel bars (added)   Dynamic standing with pointing laser to target with upper extremity movement 5 minutes (added)   Scapula retraction 2 x 10 yellow band standing (added)   Shoulder extension 2 x 10 yellow band standing (added)       Interventions DEFERRED today   Short arc quad 2 x 10 Bilateral Lower extremity    Seated Thoracic Extension over 1/2 foam roll x10  Posterior pelvic tilt 3 minutes 3 seconds hold   Straight leg raise 3 x 10 Bilateral Lower extremity   Bridges 3 x 10    Long Arc Quads 3 x 10 with 3" hold   Alternating marching with ab bracing 3 Minutes     THERAPEUTIC ACTIVITIES to improve dynamic and functional  performance for (29) minutes including:    Performed Today:    Heel raises 3 x 10 2 hands on parallel bars   Toe raises 3 x 10 2 hands on parallel bars (added)   Stair training  x 2 alternating steps 2 rails  Side to side steps 1 minutes x 2 no hands on parallel bars (added)   Sit to stand 2 x 10 mat at 24 inch no hands   Interventions DEFERRED today     Posture stands with arm support, 10" holds  Standing Forward Step Ups @steps, x 15 alternating  Controlled Sit Downs to stand, 3 x 5  Forward and backward jumping 2 hands on parallel bars 2 x 6 each direction  Leg Press, 50x 105 pound   Knee extension matrix, 2 x 10, 27#   March in place x 30 total 2 hands on parallel bars          Next Session:  Progress resistance        Patient Education and Home Exercises       Home Exercises Provided and Patient Education Provided     Education provided: (with treatment above) minutes  PURPOSE: Patient educated on the impairments noted above and the effects of physical therapy intervention to improve overall condition and QOL.   EXERCISE: Patient was educated on all the above exercise prior/during/after " for proper posture, positioning, and execution for safe performance with home exercise program.   STRENGTH: Patient educated on the importance of improved core and extremity strength in order to improve alignment of the spine and extremities with static positions and dynamic movement.   GAIT & BALANCE: Patient educated on the importance of strong core and lower extremity musculature in order to improve both static and dynamic balance, improve gait mechanics, reduce fall risk and improve household and community mobility.   POSTURE: Patient educated on postural awareness to reduce stress and maintain optimal alignment of the spine with static positions and dynamic movement     Written Home Exercises Provided: yes.  Exercises were reviewed and [place] was able to demonstrate them prior to the end of the session.  [place] demonstrated good  understanding of the education provided. See EMR under Patient Instructions for exercises provided during therapy sessions.    Assessment     Srinath Mcknight was able to be progressed with standing exercises today to challenge is standing tolerance and balance. He was fatigued after this session. He reports having to sit while performing laser activity due to upper extremity fatigue and not because of knee/low back pain.      [place] is progressing well towards his goals.   Patient prognosis is Good.     Patient will continue to benefit from skilled outpatient physical therapy to address the deficits listed in the problem list box on initial evaluation, provide pt/family education and to maximize patient's level of independence in the home and community environment.     Patient's spiritual, cultural and educational needs considered and pt agreeable to plan of care and goals.     Anticipated Barriers for therapy: co-morbidities, chronicity of condition, and adherence to treatment plan        Short Term Goals:  4 weeks Status  Date Met   PAIN: Pt will report worst pain of  6/10 in order to progress toward max functional ability and improve quality of life. [x] Progressing  [] Met  [] Not Met     STRENGTH: Patient will improve strength to 50% of stated goals, listed in objective measures above, in order to progress towards independence with functional activities. [] Progressing  [] Met  [] Not Met  6/4/2024   POSTURE: Patient will correct postural deviations in sitting and standing, to decrease pain and promote long term stability.  [x] Progressing  [] Met  [] Not Met     HEP: Patient will demonstrate independence with HEP in order to progress toward functional independence. [x] Progressing  [] Met  [] Not Met     Function: Patient will improve five time sit-to-stand to 18 seconds.  [x] Progressing  [] Met  [] Not Met        Long Term Goals:  8 weeks Status Date Met   PAIN: Pt will report worst pain of 3/10 in order to progress toward max functional ability and improve quality of life [x] Progressing  [] Met  [] Not Met     FUNCTION: Patient will demonstrate improved function as indicated by a score of greater than or equal to 50 out of 100 on FOTO. [x] Progressing  [] Met  [] Not Met     STRENGTH: Patient will improve strength to stated goals, listed in objective measures above, in order to improve functional independence and quality of life.  [x] Progressing  [] Met  [] Not Met     GAIT: Patient will demonstrate normalized gait mechanics with minimal compensation in order to return to PLOF. [x] Progressing  [] Met  [] Not Met     Patient will return to normal ADL's, IADL's, community involvement, recreational activities, and work-related activities with less than or equal to 3/10 pain and maximal function.  [x] Progressing  [] Met  [] Not Met          Plan     Continue Plan of Care (POC) and progress per patient tolerance. See treatment section for details on planned progressions next session.      Alek Chavez, PTA

## 2024-06-18 ENCOUNTER — CLINICAL SUPPORT (OUTPATIENT)
Dept: REHABILITATION | Facility: HOSPITAL | Age: 84
End: 2024-06-18
Payer: MEDICARE

## 2024-06-18 DIAGNOSIS — Z74.09 DECREASED STRENGTH, ENDURANCE, AND MOBILITY: ICD-10-CM

## 2024-06-18 DIAGNOSIS — R68.89 DECREASED STRENGTH, ENDURANCE, AND MOBILITY: ICD-10-CM

## 2024-06-18 DIAGNOSIS — R26.89 BALANCE PROBLEM: Primary | ICD-10-CM

## 2024-06-18 DIAGNOSIS — R53.1 DECREASED STRENGTH, ENDURANCE, AND MOBILITY: ICD-10-CM

## 2024-06-18 PROCEDURE — 97530 THERAPEUTIC ACTIVITIES: CPT | Mod: HCNC,CQ

## 2024-06-18 PROCEDURE — 97112 NEUROMUSCULAR REEDUCATION: CPT | Mod: HCNC,CQ

## 2024-06-18 NOTE — PROGRESS NOTES
[facility]   Physical Therapy Treatment Note        Name: [place] [facility] Number: [id number]    Therapy Diagnosis:   Encounter Diagnoses   Name Primary?    Balance problem Yes    Decreased strength, endurance, and mobility          Physician: Jose Roger NP    Visit Date: 6/18/2024    Physician Orders: PT Eval and Treat  Medical Diagnosis from Referral: Gait Instability   Evaluation Date: 5/3/2024  Authorization Period Expiration: 12/31/2024  Plan of Care Expiration: 7/3/2024  Progress Note Due: 7/3/2024  Visit # / Visits authorized: 11/20 + eval    FOTO: 2/3 (last performed on 6/4/2024)         Precautions: Standard and Fall  PTA Visit #: 4/5     Time In: 1400   Time Out: 1500   Total Billable Time: 36 minutes (Billing reflects 1 on 1 treatment time spent with patient)  Total romy this session: 60 minutes        Subjective     Patient reports: Has some low back pain with walking      He was compliant with home exercise program.    Response to previous treatment: feeling good after last session    Functional change: decreased walking and standing tolerance. Walking and balancing better.     Pain: 4/10     Location: knee    Objective      Objective Measures updated at progress report or POC update only unless otherwise noted.       Treatment     [place] received the treatments listed below:     MANUAL THERAPY TECHNIQUES were applied for (0) minutes, including:    Soft tissue mobilization:   right  ITB, quadriceps  Joint mobilizations:   NA  Functional dry needling: DEFERRED    THERAPEUTIC EXERCISES to develop strength, endurance, ROM, flexibility, posture, and core stabilization for (11) minutes including:     Performed Today:   Nustep 7 minutes level 4  Gastroc Stretch-bottom of step- standing- 30 seconds x4 each  Lower trunk rotation 3 minutes  Interventions DEFERRED today       Bike, x 5 minutes  Hamstring stretch supine 3 x 30 seconds Bilateral Lower extremity  "  Tailgaters, 3 Minutes 3 pound        NEUROMUSCULAR RE-EDUCATION ACTIVITIES to improve Balance, Coordination, Kinesthetic, Sense, Proprioception, and Posture for (13) minutes.    The following were included: (increased time for added reps today.     Performed Today:     Seated Thoracic Extension over 1/2 foam roll x10   Short arc quad 2 x 10 Bilateral Lower extremity    Bridges 3 x 10    Straight leg raise 3 x 10 Bilateral Lower extremity   Long Arc Quads 3 x 10 with 3" hold   Alternating marching with ab bracing 3 Minutes Interventions DEFERRED today   Posterior pelvic tilt 3 minutes 3 seconds hold   Hip extension standing 3 x 10 2 hands on parallel bars (added)   Dynamic standing with pointing laser to target with upper extremity movement 5 minutes (added)   Scapula retraction 2 x 10 yellow band standing (added)   Shoulder extension 2 x 10 yellow band standing (added)        THERAPEUTIC ACTIVITIES to improve dynamic and functional  performance for (12) minutes including:    Performed Today:    Stair training  x 2 alternating steps 2 rails  Sit to stand 2 x 10 mat at 24 inch no hands  Standing Forward Step Ups @steps, x 15 alternating  Forward and backward step ups at step x 15 alternating  Interventions DEFERRED today     Posture stands with arm support, 10" holds  Forward and backward jumping 2 hands on parallel bars 2 x 6 each direction  Leg Press, 50x 105 pound   Knee extension matrix, 2 x 10, 27#   March in place x 30 total 2 hands on parallel bars   Toe raises 3 x 10 2 hands on parallel bars (added)   Side to side steps 1 minutes x 2 no hands on parallel bars (added)        Next Session:  Progress resistance        Patient Education and Home Exercises       Home Exercises Provided and Patient Education Provided     Education provided: (with treatment above) minutes  PURPOSE: Patient educated on the impairments noted above and the effects of physical therapy intervention to improve overall condition and QOL. "   EXERCISE: Patient was educated on all the above exercise prior/during/after for proper posture, positioning, and execution for safe performance with home exercise program.   STRENGTH: Patient educated on the importance of improved core and extremity strength in order to improve alignment of the spine and extremities with static positions and dynamic movement.   GAIT & BALANCE: Patient educated on the importance of strong core and lower extremity musculature in order to improve both static and dynamic balance, improve gait mechanics, reduce fall risk and improve household and community mobility.   POSTURE: Patient educated on postural awareness to reduce stress and maintain optimal alignment of the spine with static positions and dynamic movement     Written Home Exercises Provided: yes.  Exercises were reviewed and [place] was able to demonstrate them prior to the end of the session.  [place] demonstrated good  understanding of the education provided. See EMR under Patient Instructions for exercises provided during therapy sessions.    Assessment     Srinath Mcknight performed all exercises today with good quality and reports fatigue after this session. He required lest rest during this session indicating that he is getting stronger.     [place] is progressing well towards his goals.   Patient prognosis is Good.     Patient will continue to benefit from skilled outpatient physical therapy to address the deficits listed in the problem list box on initial evaluation, provide pt/family education and to maximize patient's level of independence in the home and community environment.     Patient's spiritual, cultural and educational needs considered and pt agreeable to plan of care and goals.     Anticipated Barriers for therapy: co-morbidities, chronicity of condition, and adherence to treatment plan        Short Term Goals:  4 weeks Status  Date Met   PAIN: Pt will report worst pain of 6/10 in order to  progress toward max functional ability and improve quality of life. [x] Progressing  [] Met  [] Not Met     STRENGTH: Patient will improve strength to 50% of stated goals, listed in objective measures above, in order to progress towards independence with functional activities. [] Progressing  [] Met  [] Not Met  6/4/2024   POSTURE: Patient will correct postural deviations in sitting and standing, to decrease pain and promote long term stability.  [x] Progressing  [] Met  [] Not Met     HEP: Patient will demonstrate independence with HEP in order to progress toward functional independence. [x] Progressing  [] Met  [] Not Met     Function: Patient will improve five time sit-to-stand to 18 seconds.  [x] Progressing  [] Met  [] Not Met        Long Term Goals:  8 weeks Status Date Met   PAIN: Pt will report worst pain of 3/10 in order to progress toward max functional ability and improve quality of life [x] Progressing  [] Met  [] Not Met     FUNCTION: Patient will demonstrate improved function as indicated by a score of greater than or equal to 50 out of 100 on FOTO. [x] Progressing  [] Met  [] Not Met     STRENGTH: Patient will improve strength to stated goals, listed in objective measures above, in order to improve functional independence and quality of life.  [x] Progressing  [] Met  [] Not Met     GAIT: Patient will demonstrate normalized gait mechanics with minimal compensation in order to return to PLOF. [x] Progressing  [] Met  [] Not Met     Patient will return to normal ADL's, IADL's, community involvement, recreational activities, and work-related activities with less than or equal to 3/10 pain and maximal function.  [x] Progressing  [] Met  [] Not Met          Plan     Continue Plan of Care (POC) and progress per patient tolerance. See treatment section for details on planned progressions next session.      Alek Chavez, PTA

## 2024-06-24 ENCOUNTER — OFFICE VISIT (OUTPATIENT)
Dept: PULMONOLOGY | Facility: CLINIC | Age: 84
End: 2024-06-24
Payer: MEDICARE

## 2024-06-24 VITALS
RESPIRATION RATE: 18 BRPM | BODY MASS INDEX: 30.25 KG/M2 | HEIGHT: 72 IN | SYSTOLIC BLOOD PRESSURE: 142 MMHG | DIASTOLIC BLOOD PRESSURE: 80 MMHG | HEART RATE: 76 BPM | OXYGEN SATURATION: 99 % | WEIGHT: 223.31 LBS

## 2024-06-24 DIAGNOSIS — E78.2 COMBINED HYPERLIPIDEMIA ASSOCIATED WITH TYPE 2 DIABETES MELLITUS: Chronic | ICD-10-CM

## 2024-06-24 DIAGNOSIS — G47.52 CONTROLLED REM SLEEP BEHAVIOR DISORDER: ICD-10-CM

## 2024-06-24 DIAGNOSIS — R60.0 EDEMA OF LEG: ICD-10-CM

## 2024-06-24 DIAGNOSIS — E11.51 TYPE 2 DIABETES MELLITUS WITH DIABETIC PERIPHERAL ANGIOPATHY WITHOUT GANGRENE, WITHOUT LONG-TERM CURRENT USE OF INSULIN: ICD-10-CM

## 2024-06-24 DIAGNOSIS — E11.69 COMBINED HYPERLIPIDEMIA ASSOCIATED WITH TYPE 2 DIABETES MELLITUS: Chronic | ICD-10-CM

## 2024-06-24 DIAGNOSIS — R94.2 DIFFUSION CAPACITY OF LUNG (DL), DECREASED: Primary | ICD-10-CM

## 2024-06-24 DIAGNOSIS — E11.59 HYPERTENSION ASSOCIATED WITH DIABETES: ICD-10-CM

## 2024-06-24 DIAGNOSIS — I15.2 HYPERTENSION ASSOCIATED WITH DIABETES: ICD-10-CM

## 2024-06-24 PROCEDURE — 3077F SYST BP >= 140 MM HG: CPT | Mod: HCNC,CPTII,S$GLB, | Performed by: INTERNAL MEDICINE

## 2024-06-24 PROCEDURE — 1160F RVW MEDS BY RX/DR IN RCRD: CPT | Mod: HCNC,CPTII,S$GLB, | Performed by: INTERNAL MEDICINE

## 2024-06-24 PROCEDURE — 3079F DIAST BP 80-89 MM HG: CPT | Mod: HCNC,CPTII,S$GLB, | Performed by: INTERNAL MEDICINE

## 2024-06-24 PROCEDURE — 3288F FALL RISK ASSESSMENT DOCD: CPT | Mod: HCNC,CPTII,S$GLB, | Performed by: INTERNAL MEDICINE

## 2024-06-24 PROCEDURE — 1101F PT FALLS ASSESS-DOCD LE1/YR: CPT | Mod: HCNC,CPTII,S$GLB, | Performed by: INTERNAL MEDICINE

## 2024-06-24 PROCEDURE — 1159F MED LIST DOCD IN RCRD: CPT | Mod: HCNC,CPTII,S$GLB, | Performed by: INTERNAL MEDICINE

## 2024-06-24 PROCEDURE — 99999 PR PBB SHADOW E&M-EST. PATIENT-LVL V: CPT | Mod: PBBFAC,HCNC,, | Performed by: INTERNAL MEDICINE

## 2024-06-24 PROCEDURE — 99214 OFFICE O/P EST MOD 30 MIN: CPT | Mod: HCNC,S$GLB,, | Performed by: INTERNAL MEDICINE

## 2024-06-24 RX ORDER — CLONAZEPAM 0.5 MG/1
1.5 TABLET ORAL NIGHTLY
Qty: 270 TABLET | Refills: 0 | Status: SHIPPED | OUTPATIENT
Start: 2024-06-24 | End: 2024-09-22

## 2024-06-24 NOTE — PROGRESS NOTES
Subjective:       Srinath Mcknight is a 83 y.o. male   Last visit was 07/26/2018  Has been doing overall well.  [place] score 5. Recent revision hip Left  His major sleep issues a REM behavior disorder, PLMD and obstructive sleep apnea  With regard to obstructive sleep apnea and this is borderline patient has been reluctant to use CPAP in past.  REM behavioral events have been very infrequent less than once or twice in the month  He is stable on a regimen of clonidine 0.5 mg.  And melatonin   He is not taking Mirapex for his periodic limb movement  No cough no wheezing or shortness of breath  I have reviewed the patient's medical history in detail and updated the computerized patient record.    01/05/2022  Follow up visit to review tersts  C/o easily fatigue, DAMON   See cardiology , -ve w/u  Seen Dr Zimmer  ABG: Compensated resp alkalosis  6MWD:Reduced exercise capacity, 32.42%, Dyspnea wa grade 3, SpO2 100%, Peak BP and HR was 103 BPM, /64  PFT: Normal Spirometry:Mild restriction: DLCO mild reduced  Bed time:1130pm  Wake time 9 am  Appear to have stopped Klonopin and Melatonin while in hospital, says wife observed sleep reenactment activity    06/15/2022:  Last visit was 01/05/2022  Interested in Pul rehab: did not qualify last testing  No cough, No wheezing  SOB: frustrating after hip replacement and back surgery  PFT: low MVV and restriction  Not on Oxygen  Has GAYLA inhaler, not sure  regardinfg sleep issues  Stable on Klonopin and Melatonin  No reenactment    07/29/2022  Reviewed PFT and Walk  Improved from 32.4% to 44.21%  SNIFF was normal  [place] 4  Accepted to pul rehab  PFT: restriction with reduced DLCO    02/08/2023  Last visit 07/29/2022  Snoring when sleep on back[place] 7  Bed time 11 pm, wake time 11am  Taking Melatonin  No sleep related movements  Klonopin + Melatonin  No SOB,   [place] 7  Has not walked out of bed recently  PSG 2017 reveiwed  Reluctant to wear CPAP in past   BMI increased  from 27.9 to 28  Increased snoring     03/30/2023  Last visit  Here to review PSG  AHI was 2.4/hr  Sleep talking was observed x 1  No RBD  .0/hr with 50 associated with arousal  Added Mirapex  No CHARLES   Snoring  with SpO 2 90%     07/06/2023  Followup  [place] score 7  Bed time: 11: 30 pm  Wake time 10:30 am  RBD events in early Am 5-6 am, wake out of bed  Occurs 2-3 nights  Adherent with Mirapex, increased dose melatoni  Snoring at night sleeping on back and left side  Phamacy alerted him about risk of benzo in elderly  At this point in time caanot safely DC klonopin  Will increase melatonin to target max dose 20 mg  Discussed with patient medical neccessity and advised spouse to use spare bedroom( reluctant)  No ETOH    03/20/2024  Followup  Here alone, drove to appt  Occasionas of dream enactment fewer   However will murmur in bed  Klonopin 1.0 mg and melation 10 mg  Will increase to Klonopin 1.5 mg and melatoni 15 mg  Has chronic issues wityh balance, using cane  Has not fallen  Wants to see neurology  Mirapex: adherence poor: refill sent      06/24/2024   Follow-up visit   No nocturnal REM associated events  Daytime: going to gym, independent ADL: trim hedges vacuum  Going to PT , bad knee and leg swelling  Has BA stocking  No DTS  [place] 5  Regime Melatonin, Klonopin, Mirapex  Bed 12 MN  Wake time 10 am    Previous Report(s) Reviewed: historical medical records and office notes     The following portions of the patient's history were reviewed and updated as appropriate:   He  has a past medical history of Anemia due to unknown mechanism (11/10/2015), Angina pectoris (2014), Arthritis, Back pain, Coronary artery disease, Diabetes mellitus with stage 3 chronic kidney disease (11/18/2020), DM (diabetes mellitus) (25-30 years), DM (diabetes mellitus), Encounter for blood transfusion, Gout (12/05/2017), History of blood transfusion, Hyperlipemia, Hypertension, CHARLES (obstructive sleep apnea), Polyneuropathy,  Spinal cord disease, 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  He does not have any pertinent problems on file.  He  has a past surgical history that includes Rotator cuff repair (Left, 2006); Shoulder arthroscopy w/ rotator cuff repair (Right, 2009); Laminectomy (07/22/2016); Hernia repair (Bilateral, 04/18/2017); Joint replacement (Left, 10/09/2017); Back surgery (2019); lumbar foraminotomy (03/2018); left elbow (10/2017); Revision total hip arthroplasty (Left, 9/11/2018); Esophagogastroduodenoscopy (N/A, 6/30/2020); Colonoscopy (N/A, 6/30/2020); and Injection of joint (Right, 4/14/2023).  His family history includes Cancer (age of onset: 50) in his brother; Diabetes in his father, mother, and sister; Drug abuse in his brother; Heart disease in his father and mother; Hypertension in his father and mother; Stroke in his father.  He  reports that he has never smoked. He has never been exposed to tobacco smoke. He has never used smokeless tobacco. He reports that he does not currently use alcohol. He reports that he does not use drugs.  He has a current medication list which includes the following prescription(s): allopurinol, bisacodyl, blood glucose control, low, true metrix air glucose meter, cyanocobalamin (vitamin b-12), diclofenac sodium, garlic extract, jardiance, lactulose, losartan, lubiprostone, melatonin, metformin, metoprolol succinate, mirabegron, mometasone 0.1%, multivitamin, pramipexole, pravastatin, pregabalin, sildenafil, true metrix glucose test strip, trueplus lancets, cetirizine, clonazepam, clotrimazole, and arexvy (pf).  Current Outpatient Medications on File Prior to Visit   Medication Sig Dispense Refill    allopurinoL (ZYLOPRIM) 100 MG tablet Take 1 tablet (100 mg total) by mouth once daily. 90 tablet 0    bisacodyl  (DULCOLAX) 10 mg Supp   0    blood glucose control, low Soln by Misc.(Non-Drug; Combo Route) route.      blood-glucose meter (TRUE METRIX AIR GLUCOSE METER) kit USE AS DIRECTED 1 each 0    cyanocobalamin, vitamin B-12, 1,000 mcg Subl Place 1,000 mcg under the tongue once daily. 90 tablet 3    diclofenac sodium (VOLTAREN) 1 % Gel Apply 2 g topically 4 (four) times daily as needed (pain). 200 g 2    GARLIC EXTRACT ORAL Take 1 tablet by mouth once daily. Per [facility]      JARDIANCE 10 mg tablet       lactulose (CHRONULAC) 10 gram/15 mL solution SMARTSI Milliliter(s) By Mouth Daily      losartan (COZAAR) 50 MG tablet Take 1 tablet (50 mg total) by mouth once daily. 90 tablet 3    lubiprostone (AMITIZA) 24 MCG Cap Take 1 capsule (24 mcg total) by mouth 2 (two) times daily with meals. 60 capsule 2    melatonin 10 mg Tab Take 1.5 tablets (15 mg total) by mouth every evening. 45 tablet 11    metFORMIN (GLUCOPHAGE) 1000 MG tablet TAKE 1 TABLET TWICE DAILY WITH MEALS 180 tablet 0    metoprolol succinate (TOPROL-XL) 50 MG 24 hr tablet TAKE 1 TABLET EVERY DAY 90 tablet 3    mirabegron (MYRBETRIQ) 50 mg Tb24 Take 1 tablet (50 mg total) by mouth once daily. 30 tablet 11    mometasone 0.1% (ELOCON) 0.1 % cream Apply topically once daily. 50 g 2    multivitamin (THERAGRAN) per tablet Take 1 tablet by mouth once daily.      pramipexole (MIRAPEX) 0.125 MG tablet Take 1 tablet (0.125 mg total) by mouth every evening. 90 tablet 2    pravastatin (PRAVACHOL) 40 MG tablet TAKE 1 TABLET EVERY DAY 90 tablet 1    pregabalin (LYRICA) 75 MG capsule Take 1-2 capsules ( mg total) by mouth every evening. 180 capsule 1    sildenafiL (VIAGRA) 100 MG tablet Take 1 tablet (100 mg total) by mouth daily as needed for Erectile Dysfunction. 15 tablet 5    TRUE METRIX GLUCOSE TEST STRIP Strp USE AS DIRECTED TO CHECK SUGARS 300 strip 3    TRUEPLUS LANCETS 33 gauge Misc USE AS DIRECTED TO CHECK SUGARS 300 each 3    [DISCONTINUED]  clonazePAM (KLONOPIN) 0.5 MG tablet Take 3 tablets (1.5 mg total) by mouth every evening. 270 tablet 0    cetirizine (ZYRTEC) 10 MG tablet Take 1 tablet (10 mg total) by mouth every evening. for 14 days 14 tablet 0    clotrimazole (LOTRIMIN) 1 % cream Apply topically 2 (two) times daily. for 10 days 60 g 0     No current facility-administered medications on file prior to visit.     He is allergic to sulfa (sulfonamide antibiotics)..     Review of Systems   Constitutional:  Positive for malaise/fatigue.   Eyes: Negative.    Respiratory:          Snoring   Cardiovascular: Negative.    Genitourinary: Negative.    Musculoskeletal: Negative.    Neurological: Negative.    Endo/Heme/Allergies: Negative.    Psychiatric/Behavioral:  Positive for memory loss.    All other systems reviewed and are negative.        Objective:      Vitals:    06/24/24 0950   BP: (!) 142/80   Pulse: 76   Resp: 18   SpO2: 99%   Weight: 101.3 kg (223 lb 5.2 oz)   Height: 6' (1.829 m)               Using cane    Physical Exam  Vitals and nursing note reviewed.   Constitutional:       Appearance: He is well-developed.       HENT:      Head: Normocephalic and atraumatic.      Nose: Nose normal.   Eyes:      General:         Left eye: No discharge.      Pupils: Pupils are equal, round, and reactive to light.   Neck:      Vascular: No JVD.   Cardiovascular:      Rate and Rhythm: Normal rate and regular rhythm.      Heart sounds: Normal heart sounds. No murmur heard.  Pulmonary:      Effort: Pulmonary effort is normal. No respiratory distress.   Abdominal:      General: Bowel sounds are normal.      Palpations: Abdomen is soft.   Musculoskeletal:         General: No deformity. Normal range of motion.      Cervical back: Normal range of motion and neck supple.   Skin:     General: Skin is warm and dry.   Neurological:      Mental Status: He is alert and oriented to person, place, and time.      Deep Tendon Reflexes: Reflexes are normal and symmetric.             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             Relevant Medications    clonazePAM (KLONOPIN) 0.5 MG tablet    Other Relevant Orders    RSVPreF Recombinant (Arexvy)    Edema of leg     BA stocking  Encouraged to wear           Plan:      Overall stable.   Continue current regime: Melatonin and Klonopin+Mirapex    Closely monitor    Requested Prescriptions     Signed Prescriptions Disp Refills    clonazePAM (KLONOPIN) 0.5 MG tablet 270 tablet 0     Sig: Take 3 tablets (1.5 mg total) by mouth every evening.         Requested Prescriptions     Signed Prescriptions Disp Refills    clonazePAM (KLONOPIN) 0.5 MG tablet 270 tablet 0     Sig: Take 3 tablets (1.5 mg total) by mouth every evening.         Follow up in about 5 months (around 11/24/2024), or refill meds, BA stockings, Needs RSV.    This note was prepared using voice recognition system and is likely to have sound alike errors that may have been overlooked even after proof reading.  Please call me with any questions    Discussed diagnosis, its evaluation, treatment and usual course. All questions answered.    Thank you for the courtesy of participating in the care of this patient    Martin Machuca MD

## 2024-06-24 NOTE — ASSESSMENT & PLAN NOTE
Bed time 12 MN  Wake time 10 am  Regime  Melatonin, Klonopin, Mirapex    No nocturnal  events    Requested Prescriptions     Signed Prescriptions Disp Refills    clonazePAM (KLONOPIN) 0.5 MG tablet 270 tablet 0     Sig: Take 3 tablets (1.5 mg total) by mouth every evening.

## 2024-06-25 ENCOUNTER — CLINICAL SUPPORT (OUTPATIENT)
Dept: REHABILITATION | Facility: HOSPITAL | Age: 84
End: 2024-06-25
Payer: MEDICARE

## 2024-06-25 DIAGNOSIS — R26.89 BALANCE PROBLEM: Primary | ICD-10-CM

## 2024-06-25 DIAGNOSIS — Z74.09 DECREASED STRENGTH, ENDURANCE, AND MOBILITY: ICD-10-CM

## 2024-06-25 DIAGNOSIS — R53.1 DECREASED STRENGTH, ENDURANCE, AND MOBILITY: ICD-10-CM

## 2024-06-25 DIAGNOSIS — R68.89 DECREASED STRENGTH, ENDURANCE, AND MOBILITY: ICD-10-CM

## 2024-06-25 PROCEDURE — 97530 THERAPEUTIC ACTIVITIES: CPT | Mod: HCNC,CQ

## 2024-06-25 PROCEDURE — 97112 NEUROMUSCULAR REEDUCATION: CPT | Mod: HCNC,CQ

## 2024-06-25 NOTE — PROGRESS NOTES
[facility]   Physical Therapy Treatment Note        Name: Srinath Mcknight  [facility] Number: [id number]    Therapy Diagnosis:   Encounter Diagnoses   Name Primary?    Balance problem Yes    Decreased strength, endurance, and mobility          Physician: Jose Roger NP    Visit Date: 6/25/2024    Physician Orders: PT Eval and Treat  Medical Diagnosis from Referral: Gait Instability   Evaluation Date: 5/3/2024  Authorization Period Expiration: 12/31/2024  Plan of Care Expiration: 7/3/2024  Progress Note Due: 7/3/2024  Visit # / Visits authorized: 12/20 + eval    FOTO: 2/3 (last performed on 6/4/2024)         Precautions: Standard and Fall  PTA Visit #: 4/5     Time In: 1410 (late arrival)  Time Out: 1500   Total Billable Time: 35 minutes (Billing reflects 1 on 1 treatment time spent with patient)  Total romy this session: 60 minutes        Subjective     Patient reports: he has been having more right proximal thigh cramping which has kept him from sleeping well for the past 3 nights. Cramping increases with walking and with standing. States that this cramping does not feel similar to a [place].     He was compliant with home exercise program.    Response to previous treatment: feeling good after last session    Functional change: decreased walking and standing tolerance. Walking and balancing better.     Pain: 9/10     Location: knee    Objective      Objective Measures updated at progress report or POC update only unless otherwise noted.       Treatment     [place] received the treatments listed below:     MANUAL THERAPY TECHNIQUES were applied for (0) minutes, including:    Soft tissue mobilization:   right  ITB, quadriceps  Joint mobilizations:   NA  Functional dry needling: DEFERRED    THERAPEUTIC EXERCISES to develop strength, endurance, ROM, flexibility, posture, and core stabilization for (10) minutes including:     Performed Today:   Gastroc Stretch-bottom of step- standing-  "30 seconds x4 each  Bike, x 5 minutes  Lower trunk rotation 3 minutes  Interventions DEFERRED today   Nustep 7 minutes level 4  Hamstring stretch supine 3 x 30 seconds Bilateral Lower extremity   Tailgaters, 3 Minutes 3 pound        NEUROMUSCULAR RE-EDUCATION ACTIVITIES to improve Balance, Coordination, Kinesthetic, Sense, Proprioception, and Posture for (14) minutes.    The following were included: (increased time for added reps today.     Performed Today:     Seated Thoracic Extension over 1/2 foam roll x10   Short arc quad 2 x 10 Bilateral Lower extremity    Bridges 3 x 10    Straight leg raise 3 x 10 Bilateral Lower extremity   Long Arc Quads 3 x 10 with 3" hold   Alternating marching with ab bracing 3 Minutes Interventions DEFERRED today   Posterior pelvic tilt 3 minutes 3 seconds hold   Hip extension standing 3 x 10 2 hands on parallel bars (added)   Dynamic standing with pointing laser to target with upper extremity movement 5 minutes (added)   Scapula retraction 2 x 10 yellow band standing (added)   Shoulder extension 2 x 10 yellow band standing (added)        THERAPEUTIC ACTIVITIES to improve dynamic and functional  performance for (10) minutes including:    Performed Today:    Standing Forward Step Ups @steps, x 15 alternating  Sit to stand 2 x 10 mat at 24 inch no hands Interventions DEFERRED today     Posture stands with arm support, 10" holds  Forward and backward jumping 2 hands on parallel bars 2 x 6 each direction  Leg Press, 50x 105 pound   Forward and backward step ups at step x 15 alternating   Knee extension matrix, 2 x 10, 27#   March in place x 30 total 2 hands on parallel bars   Toe raises 3 x 10 2 hands on parallel bars (added)   Side to side steps 1 minutes x 2 no hands on parallel bars (added)   Stair training  x 2 alternating steps 2 rails       Next Session:  Progress resistance        Patient Education and Home Exercises       Home Exercises Provided and Patient Education Provided "     Education provided: (with treatment above) minutes  PURPOSE: Patient educated on the impairments noted above and the effects of physical therapy intervention to improve overall condition and QOL.   EXERCISE: Patient was educated on all the above exercise prior/during/after for proper posture, positioning, and execution for safe performance with home exercise program.   STRENGTH: Patient educated on the importance of improved core and extremity strength in order to improve alignment of the spine and extremities with static positions and dynamic movement.   GAIT & BALANCE: Patient educated on the importance of strong core and lower extremity musculature in order to improve both static and dynamic balance, improve gait mechanics, reduce fall risk and improve household and community mobility.   POSTURE: Patient educated on postural awareness to reduce stress and maintain optimal alignment of the spine with static positions and dynamic movement     Written Home Exercises Provided: yes.  Exercises were reviewed and [place] was able to demonstrate them prior to the end of the session.  [place] demonstrated good  understanding of the education provided. See EMR under Patient Instructions for exercises provided during therapy sessions.    Assessment     Srinath Mcknight experienced decreased right quadriceps cramping with the initiation of exercise today during the exercise bike.patient left this session after performing all above exercises with guarded movements and with slow progressions due to him having more cramping in his right proximal quadriceps.      [place] is progressing well towards his goals.   Patient prognosis is Good.     Patient will continue to benefit from skilled outpatient physical therapy to address the deficits listed in the problem list box on initial evaluation, provide pt/family education and to maximize patient's level of independence in the home and community environment.      Patient's spiritual, cultural and educational needs considered and pt agreeable to plan of care and goals.     Anticipated Barriers for therapy: co-morbidities, chronicity of condition, and adherence to treatment plan        Short Term Goals:  4 weeks Status  Date Met   PAIN: Pt will report worst pain of 6/10 in order to progress toward max functional ability and improve quality of life. [x] Progressing  [] Met  [] Not Met     STRENGTH: Patient will improve strength to 50% of stated goals, listed in objective measures above, in order to progress towards independence with functional activities. [] Progressing  [] Met  [] Not Met  6/4/2024   POSTURE: Patient will correct postural deviations in sitting and standing, to decrease pain and promote long term stability.  [x] Progressing  [] Met  [] Not Met     HEP: Patient will demonstrate independence with HEP in order to progress toward functional independence. [x] Progressing  [] Met  [] Not Met     Function: Patient will improve five time sit-to-stand to 18 seconds.  [x] Progressing  [] Met  [] Not Met        Long Term Goals:  8 weeks Status Date Met   PAIN: Pt will report worst pain of 3/10 in order to progress toward max functional ability and improve quality of life [x] Progressing  [] Met  [] Not Met     FUNCTION: Patient will demonstrate improved function as indicated by a score of greater than or equal to 50 out of 100 on FOTO. [x] Progressing  [] Met  [] Not Met     STRENGTH: Patient will improve strength to stated goals, listed in objective measures above, in order to improve functional independence and quality of life.  [x] Progressing  [] Met  [] Not Met     GAIT: Patient will demonstrate normalized gait mechanics with minimal compensation in order to return to PLOF. [x] Progressing  [] Met  [] Not Met     Patient will return to normal ADL's, IADL's, community involvement, recreational activities, and work-related activities with less than or equal to 3/10  pain and maximal function.  [x] Progressing  [] Met  [] Not Met          Plan     Continue Plan of Care (POC) and progress per patient tolerance. See treatment section for details on planned progressions next session.      Alek Chavez, PTA

## 2024-07-03 ENCOUNTER — CLINICAL SUPPORT (OUTPATIENT)
Dept: REHABILITATION | Facility: HOSPITAL | Age: 84
End: 2024-07-03
Payer: MEDICARE

## 2024-07-03 DIAGNOSIS — Z74.09 DECREASED STRENGTH, ENDURANCE, AND MOBILITY: ICD-10-CM

## 2024-07-03 DIAGNOSIS — R26.89 BALANCE PROBLEM: Primary | ICD-10-CM

## 2024-07-03 DIAGNOSIS — R53.1 DECREASED STRENGTH, ENDURANCE, AND MOBILITY: ICD-10-CM

## 2024-07-03 DIAGNOSIS — R68.89 DECREASED STRENGTH, ENDURANCE, AND MOBILITY: ICD-10-CM

## 2024-07-03 PROCEDURE — 97530 THERAPEUTIC ACTIVITIES: CPT | Mod: HCNC | Performed by: PHYSICAL THERAPIST

## 2024-07-03 PROCEDURE — 97110 THERAPEUTIC EXERCISES: CPT | Mod: HCNC | Performed by: PHYSICAL THERAPIST

## 2024-07-03 NOTE — PROGRESS NOTES
[facility]   Physical Therapy Treatment Note + Discharge Note       Name: [facility] Number: [id number]    Therapy Diagnosis:   Encounter Diagnoses   Name Primary?    Balance problem Yes    Decreased strength, endurance, and mobility        Physician: Jose Roger NP    Visit Date: 7/3/2024    Physician Orders: PT Eval and Treat  Medical Diagnosis from Referral: Gait Instability   Evaluation Date: 5/3/2024  Authorization Period Expiration: 12/31/2024  Plan of Care Expiration: 7/3/2024  Progress Note Due: 7/3/2024  Visit # / Visits authorized: 13/20 + eval    FOTO: 3/3 (last performed on 7/3/2024)         Precautions: Standard and Fall  PTA Visit #: 0/5     Time In: 2:50 pm   Time Out: 3:30 pm   Total Billable Time: 40 minutes (Billing reflects 1 on 1 treatment time spent with patient)  Total romy this session: 40 minutes        Subjective     Patient reports: He has had the pain in the knees for years and nothing seems to help.  He does note that when he is walking and doing activity that he doesn't feel the pain but it is more when he is sitting.     He was compliant with home exercise program.    Response to previous treatment: feeling good after last session    Functional change: decreased walking and standing tolerance. Walking and balancing better.     Pain: 9/10     Location: knee    Objective      Objective Measures updated at progress report or POC update only unless otherwise noted.       STRENGTH:   L/E MMT Right  (spine) Left Pain/Dysfunction with Movement Goal Right 7/3/2024 Left 7/3/2024   Hip Flexion  4-/5 3+/5   4+/5 B 4/5 4+/5   Hip Extension  3/5 3/5 Tested in seated position 4+/5 B 4+/5 4+/5   Hip Abduction  4/5 4/5 Tested in seated position 4+/5 B 4+/5 4+/5   Knee Extension 4/5 4+/5   5/5 B 5/5 5/5   Knee Flexion 4/5 4/5 Tested in seated position 5/5 B 4+/5 4+/5   Ankle DF 3+/5 4/5   5/5 B 5/5 5/5         FUNCTIONAL TESTING     Outcome Norms Goal 7/3/2024  "  Timed Up and Go 19 seconds with cane <13.5 sec 15 seconds 15 seconds   Five Time Sit to Stand 31 seconds  Uncontrolled descent on one sit-to-stand  60s: <11.4 sec  70s: <12.6 sec  80s: 14.8 sec 18 seconds 19 seconds      Treatment     [place] received the treatments listed below:     THERAPEUTIC EXERCISES to develop strength, endurance, ROM, flexibility, posture, and core stabilization for (15) minutes including:     Performed Today:     Nustep 7 minutes level 4  Re-assessment above   Interventions DEFERRED today     Hamstring stretch supine 3 x 30 seconds Bilateral Lower extremity   Tailgaters, 3 Minutes 3 pound   Gastroc Stretch-bottom of step- standing- 30 seconds x4 each  Lower trunk rotation 3 minutes        NEUROMUSCULAR RE-EDUCATION ACTIVITIES to improve Balance, Coordination, Kinesthetic, Sense, Proprioception, and Posture for (0) minutes.    The following were included: (increased time for added reps today.     Performed Today:      Interventions DEFERRED today   Posterior pelvic tilt 3 minutes 3 seconds hold   Hip extension standing 3 x 10 2 hands on parallel bars (added)   Dynamic standing with pointing laser to target with upper extremity movement 5 minutes (added)   Scapula retraction 2 x 10 yellow band standing (added)   Shoulder extension 2 x 10 yellow band standing (added)        THERAPEUTIC ACTIVITIES to improve dynamic and functional  performance for (25) minutes including:    Performed Today:    Testing as above  Floor transfers x 2  Interventions DEFERRED today     Posture stands with arm support, 10" holds  Forward and backward jumping 2 hands on parallel bars 2 x 6 each direction  Leg Press, 50x 105 pound   Forward and backward step ups at step x 15 alternating   Knee extension matrix, 2 x 10, 27#   March in place x 30 total 2 hands on parallel bars   Toe raises 3 x 10 2 hands on parallel bars (added)   Side to side steps 1 minutes x 2 no hands on parallel bars (added)   Stair training  x " 2 alternating steps 2 rails       Next Session:  Progress resistance        Patient Education and Home Exercises       Home Exercises Provided and Patient Education Provided     Education provided: (with treatment above) minutes  PURPOSE: Patient educated on the impairments noted above and the effects of physical therapy intervention to improve overall condition and QOL.   EXERCISE: Patient was educated on all the above exercise prior/during/after for proper posture, positioning, and execution for safe performance with home exercise program.   STRENGTH: Patient educated on the importance of improved core and extremity strength in order to improve alignment of the spine and extremities with static positions and dynamic movement.   GAIT & BALANCE: Patient educated on the importance of strong core and lower extremity musculature in order to improve both static and dynamic balance, improve gait mechanics, reduce fall risk and improve household and community mobility.   POSTURE: Patient educated on postural awareness to reduce stress and maintain optimal alignment of the spine with static positions and dynamic movement     Written Home Exercises Provided: yes.  Exercises were reviewed and [place] was able to demonstrate them prior to the end of the session.  [place] demonstrated good  understanding of the education provided. See EMR under Patient Instructions for exercises provided during therapy sessions.    Assessment     Srinath Mcknight did well with episode of therapy and was able to improve timing on 5x sit-to-stand and timed up and go.  He was educated to discuss leg length with the orthopedic at next appointment to obtain shoe lift.      [place] is progressing well towards his goals.   Patient prognosis is Good.     Patient will continue to benefit from skilled outpatient physical therapy to address the deficits listed in the problem list box on initial evaluation, provide pt/family education and to  maximize patient's level of independence in the home and community environment.     Patient's spiritual, cultural and educational needs considered and pt agreeable to plan of care and goals.     Anticipated Barriers for therapy: co-morbidities, chronicity of condition, and adherence to treatment plan        Short Term Goals:  4 weeks Status  Date Met   PAIN: Pt will report worst pain of 6/10 in order to progress toward max functional ability and improve quality of life. [] Progressing  [x] Met  [] Not Met 7/5/24    STRENGTH: Patient will improve strength to 50% of stated goals, listed in objective measures above, in order to progress towards independence with functional activities. [] Progressing  [x] Met  [] Not Met  6/4/2024   POSTURE: Patient will correct postural deviations in sitting and standing, to decrease pain and promote long term stability.  [] Progressing  [x] Met  [] Not Met  7/5/24   HEP: Patient will demonstrate independence with HEP in order to progress toward functional independence. [] Progressing  [x] Met  [] Not Met 7/5/24    Function: Patient will improve five time sit-to-stand to 18 seconds.  [x] Progressing  [] Met  [] Not Met        Long Term Goals:  8 weeks Status Date Met   PAIN: Pt will report worst pain of 3/10 in order to progress toward max functional ability and improve quality of life [x] Progressing  [] Met  [] Not Met     FUNCTION: Patient will demonstrate improved function as indicated by a score of greater than or equal to 50 out of 100 on FOTO. [] Progressing  [x] Met  [] Not Met 7/5/24    STRENGTH: Patient will improve strength to stated goals, listed in objective measures above, in order to improve functional independence and quality of life.  [] Progressing  [x] Met  [] Not Met  7/5/24   GAIT: Patient will demonstrate normalized gait mechanics with minimal compensation in order to return to PLOF. [x] Progressing  [] Met  [] Not Met     Patient will return to normal ADL's,  IADL's, community involvement, recreational activities, and work-related activities with less than or equal to 3/10 pain and maximal function.  [x] Progressing  [] Met  [] Not Met          Plan     Continue Plan of Care (POC) and progress per patient tolerance. See treatment section for details on planned progressions next session.      Jennifer Kelley, PT

## 2024-07-31 ENCOUNTER — TELEPHONE (OUTPATIENT)
Dept: PAIN MEDICINE | Facility: CLINIC | Age: 84
End: 2024-07-31
Payer: MEDICARE

## 2024-08-01 ENCOUNTER — OFFICE VISIT (OUTPATIENT)
Dept: PAIN MEDICINE | Facility: CLINIC | Age: 84
End: 2024-08-01
Payer: MEDICARE

## 2024-08-01 VITALS
HEIGHT: 72 IN | RESPIRATION RATE: 7 BRPM | HEART RATE: 72 BPM | BODY MASS INDEX: 30.46 KG/M2 | WEIGHT: 224.88 LBS | DIASTOLIC BLOOD PRESSURE: 68 MMHG | SYSTOLIC BLOOD PRESSURE: 152 MMHG

## 2024-08-01 DIAGNOSIS — M54.9 DORSALGIA, UNSPECIFIED: ICD-10-CM

## 2024-08-01 DIAGNOSIS — Z98.1 HISTORY OF LUMBAR FUSION: ICD-10-CM

## 2024-08-01 DIAGNOSIS — M47.26 OSTEOARTHRITIS OF SPINE WITH RADICULOPATHY, LUMBAR REGION: Primary | Chronic | ICD-10-CM

## 2024-08-01 DIAGNOSIS — Z98.890 HISTORY OF LUMBAR LAMINECTOMY: ICD-10-CM

## 2024-08-01 DIAGNOSIS — M54.16 LUMBAR RADICULOPATHY, CHRONIC: ICD-10-CM

## 2024-08-01 DIAGNOSIS — G89.29 CHRONIC PAIN OF RIGHT KNEE: ICD-10-CM

## 2024-08-01 DIAGNOSIS — M25.561 CHRONIC PAIN OF RIGHT KNEE: ICD-10-CM

## 2024-08-01 DIAGNOSIS — M17.11 PRIMARY OSTEOARTHRITIS OF RIGHT KNEE: ICD-10-CM

## 2024-08-01 PROCEDURE — 99999 PR PBB SHADOW E&M-EST. PATIENT-LVL V: CPT | Mod: PBBFAC,HCNC,, | Performed by: ANESTHESIOLOGY

## 2024-08-01 RX ORDER — PREGABALIN 150 MG/1
150 CAPSULE ORAL 2 TIMES DAILY
Qty: 180 CAPSULE | Refills: 0 | Status: SHIPPED | OUTPATIENT
Start: 2024-08-01 | End: 2024-10-30

## 2024-08-01 NOTE — PROGRESS NOTES
"Established Patient Chronic Pain Note (Follow-up Visit)    Referring Physician: Yeison Wilder MD    PCP: Yeison Wilder MD    Chief Complaint:   Chief Complaint   Patient presents with    Low-back Pain    Leg Pain    Knee Pain    Hip Pain       Interval history 08/01/2024  Srinath Mcknight is a 83 y.o. male with past medical history significant for restrictive lung disease, hyperlipidemia, hypertension, type 2 diabetes complicated by peripheral angiopathy and nonproliferative retinopathy, stage 3 chronic kidney disease who presents to the clinic for the evaluation of lower back and leg pain. Today he reports pain is constant and is rated a 10/10.  Of note patient has history of prior L3-4 lumbar fusion and L4-5 lumbar laminectomy with Dr. Iqbal.  Today reports pain in the lower back which can radiate down the anterior aspects of bilateral lower extremities in L3-5 distribution to the knee.  Pain feels like a ball" in the thigh.  Pain is worse on the right than on the left.  Pain is exacerbated with positional changes moving from sitting to standing and with standing and with ambulation.  Patient is able to ambulate with assistive device, cane only a few minutes before requiring rest.  He does endorse associated weakness in the lower extremities associated with his pain.  Pain is improved with sitting.  Of note patient has continued Lyrica 75 mg twice daily but he is unsure of its degree of benefit.  He reports cumbersome polypharmacy and is unsure which medications are effective. Patient has continued conventional land-based physical therapy from 05/03/2024, 07/03/2024, twice weekly sessions--13 sessions total.  He reports physical therapy is beneficial only while he is actively in the session.  Patient has plans to join a gym for exercise.     He also reports longstanding right knee pain.  Patient reports receiving corticosteroid injection and series of 3 viscosupplementation with Dr. Winters (1-3/2024) " without relief exceeding 1 day.  Patient has not been evaluated for surgical intervention but secondary to history of prior numerous surgeries, he would like to avoid surgical intervention if possible.    Patient reports significant motor weakness and loss of sensations.  Patient denies night fever/night sweats, urinary incontinence, bowel incontinence, and significant weight loss.      Pain Disability Index Review:         8/1/2024     9:43 AM 8/16/2023     9:05 AM 7/17/2023     9:28 AM   Last 3 PDI Scores   Pain Disability Index (PDI) 61 42 42       Non-Pharmacologic Treatments:  Physical Therapy/Home Exercise: yes  Ice/Heat:yes  TENS: no  Acupuncture: no  Massage: yes  Chiropractic: no    Other: yes; Dr. Alcocer: L YAHIR 08/15/2017, L hip arthroplasty resection 09/04/2018      Pain Medications:  - Adjuvant Medications: Lyrica ( Pregabalin) and Topical Ointment (Voltaren Gel, Steroid cream, Anti-Inflammatory Cream, Compound cream)    Pain Procedures:   Dr. Douglas:  -09/06/2023: Right radiocarpal injection    Dr. Winters:  -02/16/2024, 02/23/2024, 03/01/2024: Right knee intra-articular joint injection with Orthovisc  -01/02/2024: Right knee intra-articular joint injection with triamcinolone  -11/09/2022: Right knee intra-articular joint injection with triamcinolone  -11/10/2021: Right knee intra-articular joint injection with triamcinolone    Dr. Brooks:  -04/14/2023:  right knee IA Synvisc injection    Dr. Roldan:  -02/15/2018:  Left-sided SI joint injection  -01/24/2018: Left-sided L4-5 and L5-S1 transforaminal epidural steroid injection    Dr. Hutchinson:  -06/14/2016: Left-sided L3-4 facet injection and cyst aspiration    Dr. Madera:  -05/18/2016:  Left hip intra-articular joint injection        Interval History (8/16/2023):   Patient presents today for follow-up visit.  Patient was last seen on 7/17/2023. At that visit, the plan was to  Schedule right genicular nerve block, but it was not approved with insurance.  Patient reports pain as 7/10 today.  He continues to have chronic knee pain.  He has failed knee steroid injections with Dr. Winters.  Also reports that he has had viscosupplementation in the past with limited pain relief.  He does not believe that he is a surgical candidate.    Interval History (7/17/2023):  Srinath Mcknight presents today for follow-up visit.  Patient was last seen on 5/11/2023. Pain in right knee has returned; it has only been about 3 months since viscosupplementation. Patient reports pain as 7/10 today.  He has been going to the gym.  He also reports right hand pain.  He saw Dr. Douglas in the past and had an injection, and the pain has returned.    Interval History (5/11/2023): Srinath Mcknight presents today for follow-up visit.  he underwent right knee Synvisc-One injection on 4/14/23 (1 month ago).  The patient reports that he is/was better following the procedure.  he reports 50% pain relief.     The changes have continued through this visit.  Patient reports pain as 8/10 today.  His main complaint today is right groin pain.  He has never had any prior treatment of his right hip in the past.    Interval History (3/23/2023):  Srinath Mcknight presents today for follow-up visit.  Patient was last seen on 2/16/2023. At that visit, the plan was to start Lyrica, only talking QHS, which seems to be helping. Patient reports pain as 6/10 today.  His main complaint today is right knee pain.  He had a right knee steroid injection with Dr. Winters on 11/09/2022 with about 3-4 months pain relief.  This was the 1st treatment he has had for his right knee in the past.  Does not have any left knee pain.  Continues to do at home physical therapy exercises, which she feels is helping his back pain.  Overall, stable; pain is better controlled than it was at the last visit.    Initial HPI (2/16/2023):  Srinath Mcknight is a 82 y.o. male with past medical history significant for hypertension, hyperlipidemia,  type 2 diabetes, coronary artery disease, urinary incontinence, sickle cell trait, stage 3 chronic kidney disease, gout, obstructive sleep apnea, history of L3-4 lumbar fusion and L4-5 lumbar laminectomy who presents to the clinic for the evaluation of lower back pain.  Of note patient reports 3 prior back surgeries:  The initial several years prior in the left lower back at [facility], and 2 lower back surgeries with Dr. Iqbal within the last 2 years.  Today patient reports pain which is constant which is rated an 8/5.  Pain is described as aching in nature.  Patient reports pain in a bandlike distribution in the lower back without radiation into the buttocks or lower extremities.  Patient does report chronic right-sided knee pain and history of total left hip arthroplasty.  Pain is exacerbated 1st thing in the morning when arising, with lumbar extension as well as with ambulation.  Patient reports he is able to ambulate only a few steps before requiring rest.  Pain is improved with lumbar support as well as sitting.  Patient also reports receiving a lumbar epidural steroid injection at the back and spine [place] without any meaningful relief.  Patient has completed conventional physical therapy last year and continues physician directed physical therapy exercises at home without meaningful relief.  Patient has been taking gabapentin 600 mg once daily without improvement in his pain.  Patient does endorse weakness in the lower extremities associated with his pain.  Patient reports significant motor weakness.  Patient denies night fever/night sweats, urinary incontinence, bowel incontinence, significant weight loss, and loss of sensations.    Pain Disability Index Review:      8/1/2024     9:43 AM 8/16/2023     9:05 AM 7/17/2023     9:28 AM   Last 3 PDI Scores   Pain Disability Index (PDI) 61 42 42       Non-Pharmacologic Treatments:  Physical Therapy/Home Exercise: yes  Ice/Heat:no  TENS:  no  Acupuncture: no  Massage: no  Chiropractic: no    Other: yes; sx x 3: [facility], Dr. Iqbal x 2      Pain Medications:  - Opioids: Percocet (Oxycodone/Acetaminophen)  - Adjuvant Medications: Clonazepam (Klonopin), Neurontin (Gabapentin), and Prednisone (Deltasone)    Relevant sx:  - 3 prior back surgeries:  [facility]st [facility], and 2 lower back surgeries with Dr. Iqbal (March 2018: L3-L4 L5 decompression/laminotomy/micro dissection; August 2019: L3-4 revision with cage insertion and laminectomy/diskectomy)  - total left hip arthroplasty (total of 2 surgeries)    Pain Procedures:   Back & Spine    Dr. Brooks:  -right knee Synvisc-One injection on 4/14/23 with 50% pain relief    Other providers:  -cervical medial branch facet injections in 2020 with Dr. Linares with subsequent RFA  -02/15/2018: Left-sided sacroiliac joint injection; Dr. Roldan  -01/24/2018: Left-sided L4/5 and L5/S1 transforaminal epidural steroid injection; Dr. Roldan  -06/14/2016: Left L3-4 facet joint injection; Dr. Hutchinson  -05/18/2016: Left hip intra-articular injection posterior approach; Dr. Madera    Orthopedics:  -right knee steroid injection with Dr. Winters on 11/09/2022 with about 3-4 months pain relief  -right wrist injection in May 2021 with Dr. Douglas       Review of Systems:  GENERAL:  No weight loss, malaise or fevers.  HEENT:   No recent changes in vision or hearing  NECK:  Negative for lumps, no difficulty with swallowing.  RESPIRATORY:  Negative for cough, wheezing or shortness of breath, patient denies any recent URI.  CARDIOVASCULAR:  Negative for chest pain or palpitations.  GI:  Negative for abdominal discomfort, blood in stools or black stools or change in bowel habits.  MUSCULOSKELETAL:  See HPI.  SKIN:  Negative for lesions, rash, and itching.  PSYCH:  No mood disorder or recent psychosocial stressors.   HEMATOLOGY/LYMPHOLOGY:  Negative for prolonged bleeding, bruising easily or swollen nodes.    NEURO:   No  history of syncope, paralysis, seizures or tremors.  All other reviewed and negative other than HPI.        OBJECTIVE:    Physical Exam:  Vitals:    08/01/24 0944   BP: (!) 152/68   Pulse: 72   Resp: (!) 7   Weight: 102 kg (224 lb 13.9 oz)   Height: 6' (1.829 m)   PainSc: 10-Worst pain ever      Body mass index is 30.5 kg/m².   (reviewed on 8/1/2024)    GENERAL: Well appearing, in no acute distress, alert and oriented x3.  PSYCH:  Mood and affect appropriate.  SKIN: Skin color, texture, turgor normal, no rashes or lesions.  HEAD/FACE:  Normocephalic, atraumatic.    PULM: No evidence of respiratory difficulty, symmetric chest rise.  GI: no obvious distention.    BACK:  Positive shopping cart sign.  Well-healed 5 in lower lumbar vertical incision.  SLR is negative to radicular pain. No pain to palpation over the facet joints of the lumbar spine or spinous processes.  Reduced range of motion with pain reproduction.  EXTREMITIES:  Peripheral joint range of motion is reduced with pain with instability noted bilateral lower extremities. No deformities, edema, or skin discoloration. Right wrist: TTP over medial wrist.  MUSCULOSKELETAL: Able to stand on heels but not toes secondary to history of broken toes.     There is no pain with palpation over the sacroiliac joints bilaterally.  Gaenslen's, Distraction/Compression.  Pain with internal/external rotation of right hip.  Fabere's positive on the right.  Facet loading test is negative bilaterally.      Right Knee: pain with extension and flexion. TTP diffusely. Crepitus Present. No pain on left.    BUE & BLE strength is normal and symmetric.  No atrophy or tone abnormalities are noted.    RIGHT Lower extremity: Hip flexion 5/5, Hip Abduction 5/5, Hip Adduction 5/5, Knee extension 5/5, Knee flexion 5/5, Ankle dorsiflexion5/5, Extensor hallucis longus 5/5, Ankle plantarflexion 5/5  LEFT Lower extremity:  Hip flexion 5/5, Hip Abduction 5/5,Hip Adduction 5/5, Knee extension  5/5, Knee flexion 5/5, Ankle dorsiflexion 5/5, Extensor hallucis longus 5/5, Ankle plantarflexion 5/5  -Normal testing knee (patellar) jerk and ankle (achilles) jerk    NEURO: BUE & BLE coordination and muscle stretch reflexes are physiologic and symmetric. No loss of sensation is noted.  GAIT: normal.      Imaging (Reviewed on 8/1/2024):    05/11/21 X-ray Knee Ortho Bilateral with Flexion  COMPARISON:  December 14, 2017  FINDINGS:  Bilateral tricompartment degenerative changes.  There is increased narrowing medial compartments.  No fracture, dislocation or effusions.  Soft tissues within normal limits.  Impression  Bilateral degenerative change without fracture or dislocation.     12/20/17 MRI Lumbar Spine W  WO Contrast    FINDINGS:  Since the previous study there has been a laminectomy at L4-L5. There is enhancement of granulation tissue at the operative site.  No abnormal enhancement surrounds the exiting nerve roots.  The thecal sac measures 9 mm AP at this level.  There is slight mass effect upon the lateral recess without definite compression of the descending L5 nerve roots. There is a synovial cyst with peripheral enhancement projecting centrally and inferiorly from the left facet joint at L3-L4 that demonstrates peripheral enhancement.  It causes mild spinal stenosis at the level of L4 and is slightly larger than on the previous study from May 2016. The conus medullaris terminates at the level ofL1-L2. No abnormal signal within the conus. Intervertebral disc levels are as follows:    T12-L1 disc: No significant disc pathology. No spinal canal or neural foraminal stenosis.    L1-L2 disc : No significant disc pathology. No spinal canal or neural foraminal stenosis.    L2-L3 disc: Normal disc height with mild degenerative facet changes and thickening of ligamentum flavum.  No significant canal or foraminal stenosis.    L3-L4 disc: Partial distal desiccation with a broad-based posterior disc bulge.  There is a  far left lateral annular fissure of the disc.  Moderate to severe degenerative facet change with bilateral facet joint effusions.  The thecal sac measures 10 mm AP but is about 50% of normal cross-sectional area.  Disc material bulges into the floor of the left exit foramen and causes mild to moderate left foraminal stenosis.  There is minimal right foraminal stenosis.  Additionally, there is a synovial cyst projects centrally and inferiorly from the left facet joint.  The synovial cyst demonstrates peripheral enhancement and measures 11 mm craniocaudal by 11 mm transverse by 5 mm AP.  It is best demonstrated on axial T2 series 6 image 23.  It causes mild thecal sac stenosis without definite neural compression.  There is a smaller synovial cyst projecting toward the left as well, remote from any neural structures.    L4-L5 disc: Level of interval laminectomy.  There is enhancing granulation tissue at the operative site without enhancement surrounding the exiting nerve roots.  The thecal sac measures 9 mm AP.  There is mild mass effect upon the lateral recesses without definite compression no descending L5 nerve roots.  Minimal bilateral foraminal stenosis.    L5-S1 disc: Partial does desiccation and disc space height loss.  Height loss is most severe toward the left where there are marginal osteophytes.  Osteophyte material encroaches into the floor of the left exit foramen causing moderate to severe left foraminal stenosis.  The right neural foramen is minimally narrowed.  Moderate degenerative facet change.  The thecal sac measures 12 mm AP.  IMPRESSION:      1.  There has been an interval laminectomy at L4-L5.  There is enhancing granulation tissue at the operative site.  There is diminished spinal stenosis at this level compared to the previous exam.    2.  Enhancement surrounds a synovial cyst that projects centrally and inferiorly from the left L3-L4 facet joint and causes mild spinal stenosis.  The cyst has  increased in size compared to the previous examination.    3.  There is mild to moderate spinal stenosis at L3-L4 where the thecal sac is about 50% of normal cross-sectional area, predominantly related to hypertrophy of the posterior elements.    4.  There is a far left lateral annular fissure of L3-L4 disc.    5.  Moderate to severe left-sided foraminal stenosis at L5-S1.       11/14/22 X-Ray Lumbar Spine AP And Lateral  FINDINGS:  Prior posterior fusion of L3-L4 with interbody spacer.  Normal alignment.  No evidence to suggest hardware failure.  Remaining lumbar vertebral bodies are normal in height and alignment.  There is mild to moderate multilevel degenerative disc disease most pronounced at L5-S1.  SI joints are intact.       11/18/19 X-Ray Cervical Spine AP And Lateral  FINDINGS:  There is visualization of C7-T1 disc level on the lateral view.  Interval progression multilevel degenerative changes throughout the C-spine.  Anterior and posterior marginal spurring with concomitant varying degrees of loss of disc height throughout the C-spine.  No acute fracture or subluxation.  C1-2 articulation appears within normal limits allowing for rotation.  Impression  Interval progression multilevel cervical spondylosis without acute fracture or subluxation.  Follow-up and/or further evaluation as warranted.        ASSESSMENT: 83 y.o. year old male with lower back pain, consistent with     1. Osteoarthritis of spine with radiculopathy, lumbar region  Ambulatory referral/consult to [facility]      2. Lumbar radiculopathy, chronic  MRI Lumbar Spine Without Contrast      3. Chronic pain of right knee  Ambulatory referral/consult to Orthopedics      4. Primary osteoarthritis of right knee  Ambulatory referral/consult to Orthopedics      5. Dorsalgia, unspecified  MRI Lumbar Spine With Contrast      6. History of lumbar laminectomy  Creatinine, serum      7. History of lumbar fusion  Creatinine, serum             PLAN:   - Interventions:  We have discussed considering caudal epidural steroid injection versus lumbar transforaminal epidural steroid injection for lumbar radiculopathy.  We will 1st review updated lumbar MRI.      - Anticoagulation use: no no anticoagulation     reviewed and consistent with use      - Medications:  -Increase Lyrica. We previously discussed potential side effects of this medication which may include drowsiness,dizziness, dry mouth, constipation or peripheral edema.   -Lyrica 150 mg BID  -90 day supply given    - Continue hemp seed oil topically for knee and back.  - Continue diclofenac 1% gel to apply 2g topically up to 4 times per day PRN.   - Continue lidocaine 2.5%-prilocaine 2.5% topical cream Q6H PRN topically; can alternate with Voltaren gel.       - Therapy: We have discussed continuing learned exercises learned at physical therapy to help manage the patient/s painful condition. The patient was previously counseled that muscle strengthening will improve the long term prognosis in regards to pain and may also help increase range of motion and mobility.  Patient has continued conventional land-based physical therapy from 05/03/2024, 07/03/2024, twice weekly sessions--13 sessions total.    -Imaging: Diagnostic imaging (MRI lumbar spine) reviewed and discussed with the patient.  Updated lumbar MRI with contrast, serum creatinine ordered.    - Referrals:   -Dr. Schuler:medical assistant contact to schedule  -Dr. Douglas:  Extensor carpi ulnaris tendonitis  -Dr. Winters:  Right knee mild osteoarthritis    - Follow up visit:  4-6 weeks.  In clinic visit.  Extended--Leatha Franks PA-C      The above plan and management options were discussed at length with patient. Patient is in agreement with the above and verbalized understanding.    - I discussed the goals of interventional chronic pain management with the patient on today's visit. We discussed a multimodal and systematic  approach to pain.  This includes diagnostic and therapeutic injections, adjuvant pharmacologic treatment, physical therapy, and at times psychiatry.  I emphasized the importance of regular exercise, core strengthening and stretching, diet and weight loss as a cornerstone of long-term pain management.    - This condition does not require this patient to take time off of work, and the primary goal of our Pain Management services is to improve the patient's functional capacity.  - Patient Questions: Answered all of the patient's questions regarding diagnoses, therapy, treatment and next steps    Visit today included increased complexity associated with the care of the episodic problem of chronic pain which was addressed and continue to manage the longitudinal care of the patient due to the serious and/or complex managed problem(s) listed above.      Hossein Brooks MD  Interventional Pain Management - [facility]    Disclaimer:  This note was prepared using voice recognition system and is likely to have sound alike errors that may have been overlooked even after proof reading.  Please call me with any questions.

## 2024-08-02 ENCOUNTER — OFFICE VISIT (OUTPATIENT)
Dept: SPORTS MEDICINE | Facility: CLINIC | Age: 84
End: 2024-08-02
Payer: MEDICARE

## 2024-08-02 VITALS — HEIGHT: 72 IN | RESPIRATION RATE: 17 BRPM | BODY MASS INDEX: 30.46 KG/M2 | WEIGHT: 224.88 LBS

## 2024-08-02 DIAGNOSIS — M17.11 PRIMARY OSTEOARTHRITIS OF RIGHT KNEE: Primary | ICD-10-CM

## 2024-08-02 DIAGNOSIS — M62.838 MUSCLE SPASM OF RIGHT LEG: ICD-10-CM

## 2024-08-02 PROCEDURE — 99999 PR PBB SHADOW E&M-EST. PATIENT-LVL II: CPT | Mod: PBBFAC,HCNC,, | Performed by: PHYSICIAN ASSISTANT

## 2024-08-02 RX ORDER — CYCLOBENZAPRINE HCL 5 MG
5 TABLET ORAL NIGHTLY
Qty: 30 TABLET | Refills: 0 | Status: SHIPPED | OUTPATIENT
Start: 2024-08-02 | End: 2024-09-01

## 2024-08-02 NOTE — PROGRESS NOTES
Orthopaedic Follow-Up Visit    Last Appointment:  03/01/2024  Diagnosis:  Primary osteoarthritis of the right knee  Prior Procedure:  Right knee Orthovisc series completed 03/01/2024    Srinath Mcknight is a 83 y.o. male who is here for f/u evaluation of right knee pain. The patient was last seen here by Dr. Winters on 03/01/2024 at which point we completed the right knee Orthovisc viscosupplementation series prior to considering further treatment options. The patient returns today reporting that the symptoms have persisted.  Reports that he had little to no relief with the gel injections. Endorses that the pain is primarily in his right thigh and right calf that wakes him up at night. He saw Dr. Brooks yesterday, she ordered an MRI of the lumbar spine.    To review his history, Srinath Mcknight is a 83 y.o. year old male patient presented on 11/10/21 with pain and dysfunction involving the right knee that began approximately 3 years ago with no specific injury but reported a gradual onset of symptoms. His symptoms included pain in the knee , localized medial , pain quality is intermittent aching.  He also reports giving way episodes that can lead to falls. His pain was aggravated by lying down at night and walking. XR showed mild knee arthritis. He had tried rest and topical ointments. We proceeded with right knee corticosteroid injection and referral to physical therapy.       Patient's medications, allergies, past medical, surgical, social and family histories were reviewed and updated as appropriate.    Review of Systems   All systems reviewed were negative.  Specifically, the patient denies fever, chills, weight loss, chest pain, shortness of breath, or dyspnea on exertion.      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 Skin is intact. Neuro is normal with no focal motor or sensory findings.    Standing exam  stance: normal alignment, no significant leg-length discrepancy  gait: antalgic, ambulates with cane        Knee                                                  RIGHT             LEFT  Skin:                                         Intact               Intact  ROM:                                        0-130              0-130  Effusion:                                   Neg                  Neg  Medial joint line tenderness:    +                      Neg  Lateral joint line tenderness:    Neg                  Neg  Sincere:                                Neg                  Neg  Patella crepitus:                       +                      Neg  Patella tenderness:                 +                       Neg  Patella grind:                            Neg                  Neg  Valgus stress:                          Neg                  Neg  Varus stress:                            Neg                  Neg  Posterior drawer:                     Neg                  Neg  N-V                                          intact               intact  Hip:                                          nml                    nml              Lower extremity edema:         Negative           negative    Neurovascular exam  - motor function grossly intact bilaterally to hip flexion, knee extension and flexion, ankle dorsiflexion and plantarflexion  - sensation intact to light touch bilaterally to femoral, tibial, tibial and peroneal distributions  - symmetrical pedal pulses    Imaging:   XR Results:  Results for orders placed during the hospital encounter of 01/02/24    X-ray Knee Ortho Right with Flexion    Narrative  EXAM: XR KNEE ORTHO RIGHT WITH FLEXION    CLINICAL HISTORY: Right knee pain    TECHNIQUE: 5 views right knee.    FINDINGS: Mild narrowing involving the medial and lateral joint compartments with small osteophytes.  No acute bony abnormality.    Impression  Mild tricompartmental osteoarthritis right knee.    Finalized on: 1/2/2024 9:33 AM By:  Abel Johns MD  [facility]# [id number]      2024-01-02 09:36:[id number]    [facility]      Physician Read: I agree with the above impression.    Assessment/Plan:   Assessment:  Srinath Mcknight is a 83 y.o. male with mild right knee arthritis    Plan:    Discussed diagnosis and treatment options with the patient today. He has known osteoarthritis of his right knee. It is mild, he has minimal joint space loss present on radiographs  He has tried conservative treatment the form of rest, activity modification, over-the-counter Tylenol, previous corticosteroid injection, viscosupplementation, over-the-counter topicals, prescription topicals.  They have even try to obtain approval for a genicular nerve block with pain management, they have had and no success gaining approval for a genicular nerve block  Discussed moving forward with a right knee corticosteroid injection for his acute pain today, patient is agreement with this plan. It has been greater than 6 months since his last corticosteroid injection in the right knee  Right knee CSI given in clinic, patient tolerated procedure well with no immediate complications  Discussed with him that I think the majority of his  symptoms are result of his low back. He is primarily complaining of thigh pain and pain that radiates to his calf. Discussed with him that I encouraged him to follow up with Dr. Brooks for this matter, he is scheduled to obtain an MRI of his lumbar spine next week.  I will provide him with a prescription for a muscle relaxer for him to take at night to see if this helps with his thigh pain, as he says that is where he primarily has pain.  Rx for Flexeril 5 mg provided for nighttime muscle pain  Follow-up with me on an as-needed basis        Lauren Leary PA-C  Sports Medicine Physician Assistant       Disclaimer: This note was prepared using a voice recognition system and is likely to have sound alike errors within the text.

## 2024-08-05 ENCOUNTER — OFFICE VISIT (OUTPATIENT)
Dept: UROLOGY | Facility: CLINIC | Age: 84
End: 2024-08-05
Payer: MEDICARE

## 2024-08-05 VITALS
HEIGHT: 72 IN | HEART RATE: 76 BPM | DIASTOLIC BLOOD PRESSURE: 82 MMHG | RESPIRATION RATE: 16 BRPM | SYSTOLIC BLOOD PRESSURE: 153 MMHG | WEIGHT: 224.88 LBS | BODY MASS INDEX: 30.46 KG/M2

## 2024-08-05 DIAGNOSIS — N52.01 ERECTILE DYSFUNCTION DUE TO ARTERIAL INSUFFICIENCY: ICD-10-CM

## 2024-08-05 DIAGNOSIS — N32.81 OAB (OVERACTIVE BLADDER): ICD-10-CM

## 2024-08-05 DIAGNOSIS — N39.41 URGE INCONTINENCE OF URINE: Primary | ICD-10-CM

## 2024-08-05 PROBLEM — M17.12 ARTHRITIS OF LEFT KNEE: Status: RESOLVED | Noted: 2017-08-15 | Resolved: 2024-08-05

## 2024-08-05 LAB
BILIRUB UR QL STRIP: NEGATIVE
GLUCOSE UR QL STRIP: NEGATIVE
KETONES UR QL STRIP: NEGATIVE
LEUKOCYTE ESTERASE UR QL STRIP: NEGATIVE
PH, POC UA: 6.5
POC BLOOD, URINE: NEGATIVE
POC NITRATES, URINE: NEGATIVE
PROT UR QL STRIP: NEGATIVE
SP GR UR STRIP: 1.02 (ref 1–1.03)
UROBILINOGEN UR STRIP-ACNC: 0.2 (ref 0.3–2.2)

## 2024-08-05 PROCEDURE — 81003 URINALYSIS AUTO W/O SCOPE: CPT | Mod: QW,HCNC,S$GLB, | Performed by: NURSE PRACTITIONER

## 2024-08-05 PROCEDURE — 3288F FALL RISK ASSESSMENT DOCD: CPT | Mod: HCNC,CPTII,S$GLB, | Performed by: NURSE PRACTITIONER

## 2024-08-05 PROCEDURE — 99999 PR PBB SHADOW E&M-EST. PATIENT-LVL III: CPT | Mod: PBBFAC,HCNC,, | Performed by: NURSE PRACTITIONER

## 2024-08-05 PROCEDURE — 99214 OFFICE O/P EST MOD 30 MIN: CPT | Mod: HCNC,S$GLB,, | Performed by: NURSE PRACTITIONER

## 2024-08-05 PROCEDURE — 1160F RVW MEDS BY RX/DR IN RCRD: CPT | Mod: HCNC,CPTII,S$GLB, | Performed by: NURSE PRACTITIONER

## 2024-08-05 PROCEDURE — 1159F MED LIST DOCD IN RCRD: CPT | Mod: HCNC,CPTII,S$GLB, | Performed by: NURSE PRACTITIONER

## 2024-08-05 PROCEDURE — 3077F SYST BP >= 140 MM HG: CPT | Mod: HCNC,CPTII,S$GLB, | Performed by: NURSE PRACTITIONER

## 2024-08-05 PROCEDURE — 3079F DIAST BP 80-89 MM HG: CPT | Mod: HCNC,CPTII,S$GLB, | Performed by: NURSE PRACTITIONER

## 2024-08-05 PROCEDURE — 1101F PT FALLS ASSESS-DOCD LE1/YR: CPT | Mod: HCNC,CPTII,S$GLB, | Performed by: NURSE PRACTITIONER

## 2024-08-05 PROCEDURE — 1125F AMNT PAIN NOTED PAIN PRSNT: CPT | Mod: HCNC,CPTII,S$GLB, | Performed by: NURSE PRACTITIONER

## 2024-08-05 RX ORDER — SOLIFENACIN SUCCINATE 10 MG/1
10 TABLET, FILM COATED ORAL DAILY
Qty: 30 TABLET | Refills: 11 | Status: SHIPPED | OUTPATIENT
Start: 2024-08-05

## 2024-08-09 RX ORDER — ALLOPURINOL 100 MG/1
100 TABLET ORAL
Qty: 90 TABLET | Refills: 0 | Status: SHIPPED | OUTPATIENT
Start: 2024-08-09

## 2024-08-09 RX ORDER — PRAVASTATIN SODIUM 40 MG/1
TABLET ORAL
Qty: 90 TABLET | Refills: 0 | Status: SHIPPED | OUTPATIENT
Start: 2024-08-09

## 2024-09-03 ENCOUNTER — HOSPITAL ENCOUNTER (OUTPATIENT)
Dept: RADIOLOGY | Facility: HOSPITAL | Age: 84
Discharge: HOME | End: 2024-09-03
Attending: ANESTHESIOLOGY
Payer: MEDICARE

## 2024-09-03 DIAGNOSIS — M54.16 LUMBAR RADICULOPATHY, CHRONIC: ICD-10-CM

## 2024-09-03 PROCEDURE — 25500020 PHARM REV CODE 255: Mod: HCNC | Performed by: ANESTHESIOLOGY

## 2024-09-03 PROCEDURE — 72158 MRI LUMBAR SPINE W/O & W/DYE: CPT | Mod: TC,HCNC

## 2024-09-03 PROCEDURE — 72158 MRI LUMBAR SPINE W/O & W/DYE: CPT | Mod: 26,HCNC,, | Performed by: RADIOLOGY

## 2024-09-03 PROCEDURE — A9585 GADOBUTROL INJECTION: HCPCS | Mod: HCNC | Performed by: ANESTHESIOLOGY

## 2024-09-03 RX ORDER — GADOBUTROL 604.72 MG/ML
10 INJECTION INTRAVENOUS
Status: COMPLETED | OUTPATIENT
Start: 2024-09-03 | End: 2024-09-03

## 2024-09-03 RX ADMIN — GADOBUTROL 10 ML: 604.72 INJECTION INTRAVENOUS at 03:09

## 2024-09-12 RX ORDER — SOLIFENACIN SUCCINATE 10 MG/1
10 TABLET, FILM COATED ORAL DAILY
Qty: 30 TABLET | Refills: 11 | Status: SHIPPED | OUTPATIENT
Start: 2024-09-12

## 2024-09-14 NOTE — PROGRESS NOTES
"Subjective:      Patient ID: Srinath Mcknight is a 83 y.o. male.    Chief Complaint: " balance issues ".      HPI 83 Years old Female with PMHx of DM / HTN / DGD's Lumbar spine / CKD / PLMD and others Medical issues came for the evaluation and recommendation of " balance issues ".       Started: almost a year.   Describes: leaning toward one side - pain increases and make my legs feels weak.   Timing: constant.   Frequency: daily.   Pain:  3 to 7/ 10.   Location: Legs / hands / fingers.   Family: DM / HTN.   Medications: Pravachol / ASA.   Worsen: stand up - hip and knee pain intensify.   Alleviated: sitting.   Associated symptoms:   one fall last two years / numbness / tingling.   Triggers: physical activities.   Prodrome symptoms: none.           Referral by PCP / " Pain Doctor ".       No dizziness / no objective weakness / denied lightheadedness with positions changes.         Review of Systems   Musculoskeletal:  Positive for arthralgias and back pain.        Right Hip and right knee pain.    All other systems reviewed and are negative.    Objective:     Neurologic Exam     Mental Status   Oriented to person, place, and time.   Registration: recalls 3 of 3 objects. Recall at 5 minutes: recalls 3 of 3 objects. Follows 3 step commands.   Attention: normal. Concentration: normal.   Speech: speech is normal   Level of consciousness: alert  Knowledge: good. Able to perform simple calculations.   Able to name object. Able to read. Able to repeat. Able to write. Normal comprehension.     Cranial Nerves   Cranial nerves II through XII intact.     CN III, IV, VI   Pupils are equal, round, and reactive to light.    Motor Exam   Muscle bulk: normal  Overall muscle tone: normal    Strength   Strength 5/5 throughout.   Right neck flexion: 5/5  Left neck flexion: 5/5  Right neck extension: 5/5  Left neck extension: 5/5  Right deltoid: 5/5  Left deltoid: 5/5  Right biceps: 5/5  Left biceps: 5/5  Right triceps: 5/5  Left " triceps: 5/5  Right wrist flexion: 5/5  Left wrist flexion: 5/5  Right wrist extension: 5/5  Left wrist extension: 5/5  Right interossei: 5/5  Left interossei: 5/5  Right abdominals: 5/5  Left abdominals: 5/5  Right iliopsoas: 5/5  Left iliopsoas: 5/  Right quadriceps: 5/  Left quadriceps: 5  Right hamstrin/5  Left hamstrin/5  Right glutei:   Left glutei:   Right anterior tibial: 55  Left anterior tibial: 5  Right posterior tibial: 5  Left posterior tibial:   Right peroneal:   Left peroneal:   Right gastroc:   Left gastroc:     Sensory Exam   Light touch normal.   Right arm vibration: normal  Left arm vibration: normal  Right leg vibration: decreased from toes  Left leg vibration: decreased from ankle  Right arm proprioception: normal  Left arm proprioception: normal  Right leg proprioception: decreased from ankle  Left leg proprioception: decreased from knee  Right arm pinprick: normal  Left arm pinprick: normal  Right leg pinprick: decreased from toes  Left leg pinprick: decreased from toes  Graphesthesia: normal  Stereognosis: normal    Gait, Coordination, and Reflexes     Gait  Gait: non-neurologic and wide-based (Antalgic.)    Coordination   Romberg: positive  Finger to nose coordination: normal  Heel to shin coordination: normal  Tandem walking coordination: abnormal    Tremor   Resting tremor: absent  Intention tremor: absent  Action tremor: absent    Reflexes   Right brachioradialis: 1+  Left brachioradialis: 1+  Right biceps: 1+  Left biceps: 1+  Right triceps: 1+  Left triceps: 1+  Right patellar: 0  Left patellar: 0  Right achilles: 0  Left achilles: 0  Right : 0  Left : 0  Right plantar: normal  Left plantar: normal  Right Perez: absent  Left Perez: absent  Right ankle clonus: absent  Left ankle clonus: absent  Right pendular knee jerk: absent  Left pendular knee jerk: absentCane for ambulation.        Physical Exam  Vitals and nursing note reviewed.    Constitutional:       Appearance: Normal appearance. He is normal weight.   HENT:      Head: Normocephalic and atraumatic.      Right Ear: Tympanic membrane normal.      Left Ear: Tympanic membrane normal.      Nose: Nose normal.      Mouth/Throat:      Mouth: Mucous membranes are moist.      Pharynx: Oropharynx is clear.   Eyes:      Extraocular Movements: Extraocular movements intact.      Conjunctiva/sclera: Conjunctivae normal.      Pupils: Pupils are equal, round, and reactive to light.   Cardiovascular:      Rate and Rhythm: Normal rate and regular rhythm.      Pulses: Normal pulses.      Heart sounds: Normal heart sounds.   Pulmonary:      Effort: Pulmonary effort is normal.      Breath sounds: Normal breath sounds.   Abdominal:      General: Abdomen is flat. Bowel sounds are normal.      Palpations: Abdomen is soft.   Genitourinary:     Comments: Deferred.   Musculoskeletal:         General: Normal range of motion.      Cervical back: Normal range of motion and neck supple.      Comments: Crepitus / pain on bilateral Knees.    Skin:     General: Skin is warm and dry.      Capillary Refill: Capillary refill takes less than 2 seconds.   Neurological:      Mental Status: He is alert and oriented to person, place, and time. Mental status is at baseline.      Cranial Nerves: Cranial nerves 2-12 are intact.      Motor: Motor strength is normal.     Coordination: Romberg Test abnormal. Finger-Nose-Finger Test and Heel to Shin Test normal.      Gait: Tandem walk abnormal.      Deep Tendon Reflexes:      Reflex Scores:       Tricep reflexes are 1+ on the right side and 1+ on the left side.       Bicep reflexes are 1+ on the right side and 1+ on the left side.       Brachioradialis reflexes are 1+ on the right side and 1+ on the left side.       Patellar reflexes are 0 on the right side and 0 on the left side.       Achilles reflexes are 0 on the right side and 0 on the left side.  Psychiatric:         Mood and  "Affect: Mood normal.         Speech: Speech normal.         Behavior: Behavior normal.         Thought Content: Thought content normal.         Judgment: Judgment normal.        Assessment:   83 Years old AA Male with PMHX as above came of the evaluation of " balance issues ".    -  Oa's Right Hip.  - Oa's Bilateral Knees.   - Sensory Polyneuropathy.   - Sensory Ataxia.   Plan:   Patient Neurological Assessment is for major joints Oa's changes / post left Hip replacement / sensory Ataxia - most probable due to   Polyneuropathy caused by DM.     NCS/EMG.     Labs:  Lipids panel / CBC - 2024 - non significant abnormalities.  Hgb A 1 C: 6.2 to 6.4 in the last 3 years or so.   CMP: Creatinine -  1.7  / GFR - 50    Personally reviewed Lumbar spine - 08/ 2024 - multi DGD's / no acute changes. Postoperative and mild degenerative changes of the lumbar spine. No high-grade central canal or neural foraminal canal narrowing suggested.     X-Ray Knees - 01/ 2024 - Mild tricompartmental osteoarthritis right knee.    Ortho Sx - following / steroids injections after conservative treatment / pain management - nerve block ?    I spent a total of 45 minutes on the day of the visit.This includes face to face time and non-face to face time preparing to see the patient (eg, review of tests),   obtaining and/or reviewing separately obtained history, documenting clinical information in the electronic or other health record, independently interpreting results and communicating results to the patient/family/caregiver, or care coordinator.    Please do not hesitate to contact me with any updates, questions or concerns.    Placido Stein MD.  General Neurologist.   "

## 2024-09-19 ENCOUNTER — OFFICE VISIT (OUTPATIENT)
Dept: NEUROLOGY | Facility: CLINIC | Age: 84
End: 2024-09-19
Payer: MEDICARE

## 2024-09-19 VITALS
HEART RATE: 73 BPM | OXYGEN SATURATION: 99 % | RESPIRATION RATE: 16 BRPM | DIASTOLIC BLOOD PRESSURE: 65 MMHG | BODY MASS INDEX: 30.46 KG/M2 | SYSTOLIC BLOOD PRESSURE: 143 MMHG | HEIGHT: 72 IN | WEIGHT: 224.88 LBS

## 2024-09-19 DIAGNOSIS — R26.89 POOR BALANCE: ICD-10-CM

## 2024-09-19 DIAGNOSIS — G60.8 SENSORY POLYNEUROPATHY: Primary | ICD-10-CM

## 2024-09-19 PROCEDURE — 99999 PR PBB SHADOW E&M-EST. PATIENT-LVL V: CPT | Mod: PBBFAC,HCNC,, | Performed by: PSYCHIATRY & NEUROLOGY

## 2024-09-20 ENCOUNTER — TELEPHONE (OUTPATIENT)
Dept: PHYSICAL MEDICINE AND REHAB | Facility: CLINIC | Age: 84
End: 2024-09-20
Payer: MEDICARE

## 2024-09-24 ENCOUNTER — TELEPHONE (OUTPATIENT)
Dept: PAIN MEDICINE | Facility: CLINIC | Age: 84
End: 2024-09-24
Payer: MEDICARE

## 2024-09-24 NOTE — PROGRESS NOTES
"Established Patient Chronic Pain Note (Follow-up Visit)    Referring Physician: Yeison Wilder MD    PCP: Yeison Wilder MD    Chief Complaint:   Chief Complaint   Patient presents with    Low-back Pain    Knee Pain     low back pain with RLE radicular pain   Right knee pain   Right hip pain         Interval History (9/25/2024):  Patient presents today for follow-up visit to review lumbar MRI results.   Patient reports pain as 5/10 today.  Continues to have right leg pain along the L4 dermatome.  He feels that he has been told that the pain is generating from different sources.  He was told in the past that he would require a right hip replacement.  He has a history of left hip replacement and has no pain on that side.  He also has right knee pain, which did not improve with injections.  He also reports that he was told in the past that 1 leg is shorter than the other, which she uses a leg lift insert for; he does feel this helps some.  He finished physical therapy 2 months ago.    Interval history 08/01/2024  Srinath Mcknight is a 83 y.o. male with past medical history significant for restrictive lung disease, hyperlipidemia, hypertension, type 2 diabetes complicated by peripheral angiopathy and nonproliferative retinopathy, stage 3 chronic kidney disease who presents to the clinic for the evaluation of lower back and leg pain. Today he reports pain is constant and is rated a 10/10.  Of note patient has history of prior L3-4 lumbar fusion and L4-5 lumbar laminectomy with Dr. Iqbal.  Today reports pain in the lower back which can radiate down the anterior aspects of bilateral lower extremities in L3-5 distribution to the knee.  Pain feels like a ball" in the thigh.  Pain is worse on the right than on the left.  Pain is exacerbated with positional changes moving from sitting to standing and with standing and with ambulation.  Patient is able to ambulate with assistive device, cane only a few minutes before " requiring rest.  He does endorse associated weakness in the lower extremities associated with his pain.  Pain is improved with sitting.  Of note patient has continued Lyrica 75 mg twice daily but he is unsure of its degree of benefit.  He reports cumbersome polypharmacy and is unsure which medications are effective. Patient has continued conventional land-based physical therapy from 05/03/2024, 07/03/2024, twice weekly sessions--13 sessions total.  He reports physical therapy is beneficial only while he is actively in the session.  Patient has plans to join a gym for exercise.  He also reports longstanding right knee pain.  Patient reports receiving corticosteroid injection and series of 3 viscosupplementation with Dr. Winters (1-3/2024) without relief exceeding 1 day.  Patient has not been evaluated for surgical intervention but secondary to history of prior numerous surgeries, he would like to avoid surgical intervention if possible.    Patient reports significant motor weakness and loss of sensations.  Patient denies night fever/night sweats, urinary incontinence, bowel incontinence, and significant weight loss.        Pain Disability Index (PDI) Score Review:      9/25/2024    10:50 AM 8/1/2024     9:43 AM 8/16/2023     9:05 AM   Last 3 PDI Scores   Pain Disability Index (PDI) 35 61 42       Non-Pharmacologic Treatments:  Physical Therapy/Home Exercise: yes  Ice/Heat:yes  TENS: no  Acupuncture: no  Massage: yes  Chiropractic: no    Other: yes; Dr. Alcocer: L YAHIR 08/15/2017, L hip arthroplasty resection 09/04/2018      Pain Medications:  - Adjuvant Medications: Lyrica ( Pregabalin) and Topical Ointment (Voltaren Gel, Steroid cream, Anti-Inflammatory Cream, Compound cream)      Pain Procedures:     Dr. Brooks:  -04/14/2023:  right knee IA Synvisc injection    Dr. Roldan:  -02/15/2018:  Left-sided SI joint injection  -01/24/2018: Left-sided L4-5 and L5-S1 transforaminal epidural steroid injection      Evangelina:  -06/14/2016: Left-sided L3-4 facet injection and cyst aspiration    Dr. Madera:  -05/18/2016:  Left hip intra-articular joint injection    Dr. Douglas:  -09/06/2023: Right radiocarpal injection    Dr. Winters:  -02/16/2024, 02/23/2024, 03/01/2024: Right knee intra-articular joint injection with Orthovisc  -01/02/2024: Right knee intra-articular joint injection with triamcinolone  -11/09/2022: Right knee intra-articular joint injection with triamcinolone  -11/10/2021: Right knee intra-articular joint injection with triamcinolone      Interval History (8/16/2023):   Patient presents today for follow-up visit.  Patient was last seen on 7/17/2023. At that visit, the plan was to  Schedule right genicular nerve block, but it was not approved with insurance. Patient reports pain as 7/10 today.  He continues to have chronic knee pain.  He has failed knee steroid injections with Dr. Winters.  Also reports that he has had viscosupplementation in the past with limited pain relief.  He does not believe that he is a surgical candidate.    Interval History (7/17/2023):  Srinath Mcknight presents today for follow-up visit.  Patient was last seen on 5/11/2023. Pain in right knee has returned; it has only been about 3 months since viscosupplementation. Patient reports pain as 7/10 today.  He has been going to the gym.  He also reports right hand pain.  He saw Dr. Douglas in the past and had an injection, and the pain has returned.    Interval History (5/11/2023): Srinath Mcknight presents today for follow-up visit.  he underwent right knee Synvisc-One injection on 4/14/23 (1 month ago).  The patient reports that he is/was better following the procedure.  he reports 50% pain relief.     The changes have continued through this visit.  Patient reports pain as 8/10 today.  His main complaint today is right groin pain.  He has never had any prior treatment of his right hip in the past.    Interval History (3/23/2023):   Srinath Mcknight presents today for follow-up visit.  Patient was last seen on 2/16/2023. At that visit, the plan was to start Lyrica, only talking QHS, which seems to be helping. Patient reports pain as 6/10 today.  His main complaint today is right knee pain.  He had a right knee steroid injection with Dr. Winters on 11/09/2022 with about 3-4 months pain relief.  This was the 1st treatment he has had for his right knee in the past.  Does not have any left knee pain.  Continues to do at home physical therapy exercises, which she feels is helping his back pain.  Overall, stable; pain is better controlled than it was at the last visit.    Initial HPI (2/16/2023):  Srinath Mcknight is a 82 y.o. male with past medical history significant for hypertension, hyperlipidemia, type 2 diabetes, coronary artery disease, urinary incontinence, sickle cell trait, stage 3 chronic kidney disease, gout, obstructive sleep apnea, history of L3-4 lumbar fusion and L4-5 lumbar laminectomy who presents to the clinic for the evaluation of lower back pain.  Of note patient reports 3 prior back surgeries:  The initial several years prior in the left lower back at [facility], and 2 lower back surgeries with Dr. Iqbal within the last 2 years.  Today patient reports pain which is constant which is rated an 8/5.  Pain is described as aching in nature.  Patient reports pain in a bandlike distribution in the lower back without radiation into the buttocks or lower extremities.  Patient does report chronic right-sided knee pain and history of total left hip arthroplasty.  Pain is exacerbated 1st thing in the morning when arising, with lumbar extension as well as with ambulation.  Patient reports he is able to ambulate only a few steps before requiring rest.  Pain is improved with lumbar support as well as sitting.  Patient also reports receiving a lumbar epidural steroid injection at the back and spine [place] without any meaningful  relief.  Patient has completed conventional physical therapy last year and continues physician directed physical therapy exercises at home without meaningful relief.  Patient has been taking gabapentin 600 mg once daily without improvement in his pain.  Patient does endorse weakness in the lower extremities associated with his pain.  Patient reports significant motor weakness.  Patient denies night fever/night sweats, urinary incontinence, bowel incontinence, significant weight loss, and loss of sensations.      Pain Disability Index Review:      9/25/2024    10:50 AM 8/1/2024     9:43 AM 8/16/2023     9:05 AM   Last 3 PDI Scores   Pain Disability Index (PDI) 35 61 42       Non-Pharmacologic Treatments:  Physical Therapy/Home Exercise: yes  Ice/Heat:no  TENS: no  Acupuncture: no  Massage: no  Chiropractic: no    Other: yes; sx x 3: [facility], Dr. Iqbal x 2      Pain Medications:  - Opioids: Percocet (Oxycodone/Acetaminophen)  - Adjuvant Medications: Clonazepam (Klonopin), Neurontin (Gabapentin), and Prednisone (Deltasone)    Relevant sx:  - 3 prior back surgeries:  1st [facility], and 2 lower back surgeries with Dr. Iqbal (March 2018: L3-L4 L5 decompression/laminotomy/micro dissection; August 2019: L3-4 revision with cage insertion and laminectomy/diskectomy)  - total left hip arthroplasty (total of 2 surgeries)      Pain Procedures:   Back & Spine    Dr. Brooks:  -right knee Synvisc-One injection on 4/14/23 with 50% pain relief    Other providers:  -cervical medial branch facet injections in 2020 with Dr. Linares with subsequent RFA  -02/15/2018: Left-sided sacroiliac joint injection; Dr. Roldan  -01/24/2018: Left-sided L4/5 and L5/S1 transforaminal epidural steroid injection; Dr. Roldan  -06/14/2016: Left L3-4 facet joint injection; Dr. Hutchinson  -05/18/2016: Left hip intra-articular injection posterior approach; Dr. Madera    Orthopedics:  -right knee steroid injection with Dr. Winters on  11/09/2022 with about 3-4 months pain relief  -right wrist injection in May 2021 with Dr. Douglas       Review of Systems:  GENERAL:  No weight loss, malaise or fevers.  HEENT:   No recent changes in vision or hearing  NECK:  Negative for lumps, no difficulty with swallowing.  RESPIRATORY:  Negative for cough, wheezing or shortness of breath, patient denies any recent URI.  CARDIOVASCULAR:  Negative for chest pain or palpitations.  GI:  Negative for abdominal discomfort, blood in stools or black stools or change in bowel habits.  MUSCULOSKELETAL:  See HPI.  SKIN:  Negative for lesions, rash, and itching.  PSYCH:  No mood disorder or recent psychosocial stressors.   HEMATOLOGY/LYMPHOLOGY:  Negative for prolonged bleeding, bruising easily or swollen nodes.    NEURO:   No history of syncope, paralysis, seizures or tremors.  All other reviewed and negative other than HPI.        OBJECTIVE:    Physical Exam:  Vitals:    09/25/24 1051   BP: (!) 160/79   Pulse: 71   Weight: 102.1 kg (225 lb 1.4 oz)   Height: 6' (1.829 m)   PainSc:   5   PainLoc: Back   Body mass index is 30.53 kg/m².   (reviewed on 9/25/2024)    GENERAL: Well appearing, in no acute distress, alert and oriented x3.  PSYCH:  Mood and affect appropriate.  SKIN: Skin color, texture, turgor normal, no rashes or lesions.  HEAD/FACE:  Normocephalic, atraumatic.    PULM: No evidence of respiratory difficulty, symmetric chest rise.    BACK:  Positive shopping cart sign.  Well-healed 5 in lower lumbar vertical incision.  SLR is Equivocal to radicular pain on right. No pain to palpation over the facet joints of the lumbar spine or spinous processes.  Reduced range of motion with pain reproduction.  EXTREMITIES:  Peripheral joint range of motion is reduced with pain with instability noted bilateral lower extremities. No deformities, edema, or skin discoloration. Right wrist: TTP over medial wrist.  MUSCULOSKELETAL: Able to stand on heels but not toes secondary to  history of broken toes.     There is no pain with palpation over the sacroiliac joints bilaterally.  Gaenslen's, Distraction/Compression.  Pain with internal/external rotation of right hip.  Fabere's positive on the right.  Facet loading test is negative bilaterally.      Right Knee: pain with extension and flexion. TTP diffusely. Crepitus Present. No pain on left.    BUE & BLE strength is normal and symmetric.  No atrophy or tone abnormalities are noted.    RIGHT Lower extremity: Hip flexion 5/5, Hip Abduction 5/5, Hip Adduction 5/5, Knee extension 5/5, Knee flexion 5/5, Ankle dorsiflexion5/5, Extensor hallucis longus 5/5, Ankle plantarflexion 5/5  LEFT Lower extremity:  Hip flexion 5/5, Hip Abduction 5/5,Hip Adduction 5/5, Knee extension 5/5, Knee flexion 5/5, Ankle dorsiflexion 5/5, Extensor hallucis longus 5/5, Ankle plantarflexion 5/5  -Normal testing knee (patellar) jerk and ankle (achilles) jerk    NEURO: BUE & BLE coordination and muscle stretch reflexes are physiologic and symmetric. No loss of sensation is noted.  GAIT: normal.          Imaging/ Diagnostic Studies/ Labs (Reviewed on 9/25/2024):    09/03/2024 MRI Lumbar Spine W WO Contrast   COMPARISON:  Plain films from 11/14/2022    FINDINGS:  There is 1-2 mm of anterolisthesis L3 on L4 within intradiscal spacer present at the L3-4 level.  Pedicle screws fixation rods also present bilaterally at the L3-4 level.  The vertebral body heights are well maintained, with no fracture.  No marrow signal abnormality suspicious for an infiltrative process.  No abnormal enhancement seen on the postcontrast images.    The conus medullaris terminates at approximately the superior endplate of L2.  The adjacent soft tissue structures show no significant abnormalities.  There is disc desiccation noted throughout the lumbar spine with moderate disc space narrowing seen at L5-S1 and more mild disc space narrowing seen at the remaining levels.    L1-L2: No significant  central canal or neural foraminal narrowing.    L2-L3: Minimal diffuse disc bulge along with bilateral ligamentum flavum hypertrophy resulting in relatively mild narrowing of the central canal at L2-3.  No neural foraminal canal narrowing.    L3-L4: No significant central canal or neural foraminal narrowing. Postoperative changes seen at this level - L3-4.    L4-L5:  Diffuse disc bulge slightly more prominent in the right paracentral region along with moderate bilateral facet arthropathy resulting in relatively mild narrowing of the central canal at L4-5 and mild narrowing of either L4-5 neural foraminal canal right greater than the left.    L5-S1:  Mild-to-moderate bilateral facet arthropathy along with disc space narrowing seen at this level resulting in no significant central canal narrowing.  The bilateral L5-S1 neural foraminal canals appear to be mildly narrowed.  Impression:   1. Postoperative and mild degenerative changes of the lumbar spine as detailed above.  No high-grade central canal or neural foraminal canal narrowing suggested.      05/11/21 X-ray Knee Ortho Bilateral with Flexion  COMPARISON:  December 14, 2017  FINDINGS:  Bilateral tricompartment degenerative changes.  There is increased narrowing medial compartments.  No fracture, dislocation or effusions.  Soft tissues within normal limits.  Impression  Bilateral degenerative change without fracture or dislocation.       12/20/17 MRI Lumbar Spine W  WO Contrast  FINDINGS:  Since the previous study there has been a laminectomy at L4-L5. There is enhancement of granulation tissue at the operative site.  No abnormal enhancement surrounds the exiting nerve roots.  The thecal sac measures 9 mm AP at this level.  There is slight mass effect upon the lateral recess without definite compression of the descending L5 nerve roots. There is a synovial cyst with peripheral enhancement projecting centrally and inferiorly from the left facet joint at L3-L4 that  demonstrates peripheral enhancement.  It causes mild spinal stenosis at the level of L4 and is slightly larger than on the previous study from May 2016. The conus medullaris terminates at the level ofL1-L2. No abnormal signal within the conus. Intervertebral disc levels are as follows:    T12-L1 disc: No significant disc pathology. No spinal canal or neural foraminal stenosis.    L1-L2 disc : No significant disc pathology. No spinal canal or neural foraminal stenosis.    L2-L3 disc: Normal disc height with mild degenerative facet changes and thickening of ligamentum flavum.  No significant canal or foraminal stenosis.    L3-L4 disc: Partial distal desiccation with a broad-based posterior disc bulge.  There is a far left lateral annular fissure of the disc.  Moderate to severe degenerative facet change with bilateral facet joint effusions.  The thecal sac measures 10 mm AP but is about 50% of normal cross-sectional area.  Disc material bulges into the floor of the left exit foramen and causes mild to moderate left foraminal stenosis.  There is minimal right foraminal stenosis.  Additionally, there is a synovial cyst projects centrally and inferiorly from the left facet joint.  The synovial cyst demonstrates peripheral enhancement and measures 11 mm craniocaudal by 11 mm transverse by 5 mm AP.  It is best demonstrated on axial T2 series 6 image 23.  It causes mild thecal sac stenosis without definite neural compression.  There is a smaller synovial cyst projecting toward the left as well, remote from any neural structures.    L4-L5 disc: Level of interval laminectomy.  There is enhancing granulation tissue at the operative site without enhancement surrounding the exiting nerve roots.  The thecal sac measures 9 mm AP.  There is mild mass effect upon the lateral recesses without definite compression no descending L5 nerve roots.  Minimal bilateral foraminal stenosis.    L5-S1 disc: Partial does desiccation and disc  space height loss.  Height loss is most severe toward the left where there are marginal osteophytes.  Osteophyte material encroaches into the floor of the left exit foramen causing moderate to severe left foraminal stenosis.  The right neural foramen is minimally narrowed.  Moderate degenerative facet change.  The thecal sac measures 12 mm AP.  IMPRESSION:      1.  There has been an interval laminectomy at L4-L5.  There is enhancing granulation tissue at the operative site.  There is diminished spinal stenosis at this level compared to the previous exam.    2.  Enhancement surrounds a synovial cyst that projects centrally and inferiorly from the left L3-L4 facet joint and causes mild spinal stenosis.  The cyst has increased in size compared to the previous examination.    3.  There is mild to moderate spinal stenosis at L3-L4 where the thecal sac is about 50% of normal cross-sectional area, predominantly related to hypertrophy of the posterior elements.    4.  There is a far left lateral annular fissure of L3-L4 disc.    5.  Moderate to severe left-sided foraminal stenosis at L5-S1.       11/14/22 X-Ray Lumbar Spine AP And Lateral  FINDINGS:  Prior posterior fusion of L3-L4 with interbody spacer.  Normal alignment.  No evidence to suggest hardware failure.  Remaining lumbar vertebral bodies are normal in height and alignment.  There is mild to moderate multilevel degenerative disc disease most pronounced at L5-S1.  SI joints are intact.       11/18/19 X-Ray Cervical Spine AP And Lateral  FINDINGS:  There is visualization of C7-T1 disc level on the lateral view.  Interval progression multilevel degenerative changes throughout the C-spine.  Anterior and posterior marginal spurring with concomitant varying degrees of loss of disc height throughout the C-spine.  No acute fracture or subluxation.  C1-2 articulation appears within normal limits allowing for rotation.  Impression  Interval progression multilevel cervical  spondylosis without acute fracture or subluxation.  Follow-up and/or further evaluation as warranted.        ASSESSMENT: 83 y.o. year old male with lower back pain, consistent with     1. Right lumbar radiculopathy  Case Request-RAD/Other Procedure Area: Right L4/5 + L5/S1 TF GAEL      2. Arthralgia of right hip  X-Ray Hips Bilateral 2 View Inc AP Pelvis      3. History of left hip replacement        4. Chronic pain of right knee            PLAN:   - Interventions:     -Schedule right L4/5 + L5/S1 TF GAEL. Patient is not taking prescription blood thinners or ASA.      -Consider right IA hip injection.    - Anticoagulation use: no no anticoagulation    - Medications:  -Refill Lyrica (pregabalin) 150mg BID x 90 day supply. We previously discussed potential side effects of this medication which may include drowsiness,dizziness, dry mouth, constipation or peripheral edema.     - Continue topical hemp seed oil topically for knee and back.  - Continue topical diclofenac 1% gel to apply 2g topically up to 4 times per day PRN.   - Continue topical lidocaine 2.5%-prilocaine 2.5% topical cream Q6H PRN topically; can alternate with Voltaren gel.       - LA  reviewed and appropriate.       - Therapy: We have discussed continuing learned exercises learned at physical therapy to help manage the patient/s painful condition. The patient was previously counseled that muscle strengthening will improve the long term prognosis in regards to pain and may also help increase range of motion and mobility.  Patient has continued conventional land-based physical therapy from 05/03/2024, 07/03/2024, twice weekly sessions--13 sessions total.    -Imaging:  MRI Lumbar Spine W WO Contrast (09/03/2024) reviewed: Postoperative and mild degenerative changes of the lumbar spine as detailed above.  No high-grade central canal or neural foraminal canal narrowing suggested.    - Referrals:   -Continue follow-up with Lauren Leary PA-C/   [facility]/ Dr. Schuler (Orthopedics): knee pain.  S/p right knee steroid in on 8/2/24. Consider workup with Orthopedics for right hip replacement if pain doesn't improve with hip injection/ GAEL.    -Continue follow-up with Dr. Douglas:  Extensor carpi ulnaris tendonitis.    - Follow up visit:        Future Appointments   Date Time Provider Department Center   9/25/2024 12:30 PM PROVIDER URGENT CARE, [facility]   10/7/2024 10:00 AM Norah Garcia MD [facility] PHYS [place]   11/1/2024 11:00 AM Yasmine Calvillo MD [facility] CARDIO [place]   11/5/2024 10:00 AM Debo Nguyễn NP ON UROLOGY [facility]   11/6/2024  1:40 PM Leatha Franks PA-C [facility] INT SHEELA [place]   11/13/2024  8:40 AM Debo Nguyễn NP [facility] UROLOGY  Medical C   11/18/2024  8:20 AM Emmanuel Schuler MD ON ORTHO [facility]   11/19/2024  9:20 AM Martin Machuca MD ON PULMSVC [facility] C   12/19/2024  8:00 AM Placido Ugalde MD [facility] NEURO [place]       - Patient Questions: Answered all of the patient's questions regarding diagnosis, therapy, and treatment.    - This condition does not require this patient to take time off of work, and the primary goal of our Pain Management services is to improve the patient's functional capacity.   - I discussed the risks, benefits, and alternatives to potential treatment options. All questions and concerns were fully addressed today in clinic.         Leatha Franks PA-C  Interventional Pain Management - [facility]    Disclaimer:  This note was prepared using voice recognition system and is likely to have sound alike errors that may have been overlooked even after proof reading.  Please call me with any questions.

## 2024-09-24 NOTE — H&P (VIEW-ONLY)
"Established Patient Chronic Pain Note (Follow-up Visit)    Referring Physician: Yeison Wilder MD    PCP: Yeison Wilder MD    Chief Complaint:   Chief Complaint   Patient presents with    Low-back Pain    Knee Pain     low back pain with RLE radicular pain   Right knee pain   Right hip pain         Interval History (9/25/2024):  Patient presents today for follow-up visit to review lumbar MRI results.   Patient reports pain as 5/10 today.  Continues to have right leg pain along the L4 dermatome.  He feels that he has been told that the pain is generating from different sources.  He was told in the past that he would require a right hip replacement.  He has a history of left hip replacement and has no pain on that side.  He also has right knee pain, which did not improve with injections.  He also reports that he was told in the past that 1 leg is shorter than the other, which she uses a leg lift insert for; he does feel this helps some.  He finished physical therapy 2 months ago.    Interval history 08/01/2024  Srinath Mcknight is a 83 y.o. male with past medical history significant for restrictive lung disease, hyperlipidemia, hypertension, type 2 diabetes complicated by peripheral angiopathy and nonproliferative retinopathy, stage 3 chronic kidney disease who presents to the clinic for the evaluation of lower back and leg pain. Today he reports pain is constant and is rated a 10/10.  Of note patient has history of prior L3-4 lumbar fusion and L4-5 lumbar laminectomy with Dr. Iqbal.  Today reports pain in the lower back which can radiate down the anterior aspects of bilateral lower extremities in L3-5 distribution to the knee.  Pain feels like a ball" in the thigh.  Pain is worse on the right than on the left.  Pain is exacerbated with positional changes moving from sitting to standing and with standing and with ambulation.  Patient is able to ambulate with assistive device, cane only a few minutes before " requiring rest.  He does endorse associated weakness in the lower extremities associated with his pain.  Pain is improved with sitting.  Of note patient has continued Lyrica 75 mg twice daily but he is unsure of its degree of benefit.  He reports cumbersome polypharmacy and is unsure which medications are effective. Patient has continued conventional land-based physical therapy from 05/03/2024, 07/03/2024, twice weekly sessions--13 sessions total.  He reports physical therapy is beneficial only while he is actively in the session.  Patient has plans to join a gym for exercise.  He also reports longstanding right knee pain.  Patient reports receiving corticosteroid injection and series of 3 viscosupplementation with Dr. Winters (1-3/2024) without relief exceeding 1 day.  Patient has not been evaluated for surgical intervention but secondary to history of prior numerous surgeries, he would like to avoid surgical intervention if possible.    Patient reports significant motor weakness and loss of sensations.  Patient denies night fever/night sweats, urinary incontinence, bowel incontinence, and significant weight loss.        Pain Disability Index (PDI) Score Review:      9/25/2024    10:50 AM 8/1/2024     9:43 AM 8/16/2023     9:05 AM   Last 3 PDI Scores   Pain Disability Index (PDI) 35 61 42       Non-Pharmacologic Treatments:  Physical Therapy/Home Exercise: yes  Ice/Heat:yes  TENS: no  Acupuncture: no  Massage: yes  Chiropractic: no    Other: yes; Dr. Alcocer: L YAHIR 08/15/2017, L hip arthroplasty resection 09/04/2018      Pain Medications:  - Adjuvant Medications: Lyrica ( Pregabalin) and Topical Ointment (Voltaren Gel, Steroid cream, Anti-Inflammatory Cream, Compound cream)      Pain Procedures:     Dr. Brooks:  -04/14/2023:  right knee IA Synvisc injection    Dr. Roldan:  -02/15/2018:  Left-sided SI joint injection  -01/24/2018: Left-sided L4-5 and L5-S1 transforaminal epidural steroid injection      Evangelina:  -06/14/2016: Left-sided L3-4 facet injection and cyst aspiration    Dr. Madera:  -05/18/2016:  Left hip intra-articular joint injection    Dr. Douglas:  -09/06/2023: Right radiocarpal injection    Dr. Winters:  -02/16/2024, 02/23/2024, 03/01/2024: Right knee intra-articular joint injection with Orthovisc  -01/02/2024: Right knee intra-articular joint injection with triamcinolone  -11/09/2022: Right knee intra-articular joint injection with triamcinolone  -11/10/2021: Right knee intra-articular joint injection with triamcinolone      Interval History (8/16/2023):   Patient presents today for follow-up visit.  Patient was last seen on 7/17/2023. At that visit, the plan was to  Schedule right genicular nerve block, but it was not approved with insurance. Patient reports pain as 7/10 today.  He continues to have chronic knee pain.  He has failed knee steroid injections with Dr. Winters.  Also reports that he has had viscosupplementation in the past with limited pain relief.  He does not believe that he is a surgical candidate.    Interval History (7/17/2023):  Srinath Mcknight presents today for follow-up visit.  Patient was last seen on 5/11/2023. Pain in right knee has returned; it has only been about 3 months since viscosupplementation. Patient reports pain as 7/10 today.  He has been going to the gym.  He also reports right hand pain.  He saw Dr. Douglas in the past and had an injection, and the pain has returned.    Interval History (5/11/2023): Srinath Mcknight presents today for follow-up visit.  he underwent right knee Synvisc-One injection on 4/14/23 (1 month ago).  The patient reports that he is/was better following the procedure.  he reports 50% pain relief.     The changes have continued through this visit.  Patient reports pain as 8/10 today.  His main complaint today is right groin pain.  He has never had any prior treatment of his right hip in the past.    Interval History (3/23/2023):   Srinath Mcknight presents today for follow-up visit.  Patient was last seen on 2/16/2023. At that visit, the plan was to start Lyrica, only talking QHS, which seems to be helping. Patient reports pain as 6/10 today.  His main complaint today is right knee pain.  He had a right knee steroid injection with Dr. Winters on 11/09/2022 with about 3-4 months pain relief.  This was the 1st treatment he has had for his right knee in the past.  Does not have any left knee pain.  Continues to do at home physical therapy exercises, which she feels is helping his back pain.  Overall, stable; pain is better controlled than it was at the last visit.    Initial HPI (2/16/2023):  Srinath Mcknight is a 82 y.o. male with past medical history significant for hypertension, hyperlipidemia, type 2 diabetes, coronary artery disease, urinary incontinence, sickle cell trait, stage 3 chronic kidney disease, gout, obstructive sleep apnea, history of L3-4 lumbar fusion and L4-5 lumbar laminectomy who presents to the clinic for the evaluation of lower back pain.  Of note patient reports 3 prior back surgeries:  The initial several years prior in the left lower back at [facility], and 2 lower back surgeries with Dr. Iqbal within the last 2 years.  Today patient reports pain which is constant which is rated an 8/5.  Pain is described as aching in nature.  Patient reports pain in a bandlike distribution in the lower back without radiation into the buttocks or lower extremities.  Patient does report chronic right-sided knee pain and history of total left hip arthroplasty.  Pain is exacerbated 1st thing in the morning when arising, with lumbar extension as well as with ambulation.  Patient reports he is able to ambulate only a few steps before requiring rest.  Pain is improved with lumbar support as well as sitting.  Patient also reports receiving a lumbar epidural steroid injection at the back and spine [place] without any meaningful  relief.  Patient has completed conventional physical therapy last year and continues physician directed physical therapy exercises at home without meaningful relief.  Patient has been taking gabapentin 600 mg once daily without improvement in his pain.  Patient does endorse weakness in the lower extremities associated with his pain.  Patient reports significant motor weakness.  Patient denies night fever/night sweats, urinary incontinence, bowel incontinence, significant weight loss, and loss of sensations.      Pain Disability Index Review:      9/25/2024    10:50 AM 8/1/2024     9:43 AM 8/16/2023     9:05 AM   Last 3 PDI Scores   Pain Disability Index (PDI) 35 61 42       Non-Pharmacologic Treatments:  Physical Therapy/Home Exercise: yes  Ice/Heat:no  TENS: no  Acupuncture: no  Massage: no  Chiropractic: no    Other: yes; sx x 3: [facility], Dr. Iqbal x 2      Pain Medications:  - Opioids: Percocet (Oxycodone/Acetaminophen)  - Adjuvant Medications: Clonazepam (Klonopin), Neurontin (Gabapentin), and Prednisone (Deltasone)    Relevant sx:  - 3 prior back surgeries:  1st [facility], and 2 lower back surgeries with Dr. Iqbal (March 2018: L3-L4 L5 decompression/laminotomy/micro dissection; August 2019: L3-4 revision with cage insertion and laminectomy/diskectomy)  - total left hip arthroplasty (total of 2 surgeries)      Pain Procedures:   Back & Spine    Dr. Brooks:  -right knee Synvisc-One injection on 4/14/23 with 50% pain relief    Other providers:  -cervical medial branch facet injections in 2020 with Dr. Linares with subsequent RFA  -02/15/2018: Left-sided sacroiliac joint injection; Dr. Roldan  -01/24/2018: Left-sided L4/5 and L5/S1 transforaminal epidural steroid injection; Dr. Roldan  -06/14/2016: Left L3-4 facet joint injection; Dr. Hutchinson  -05/18/2016: Left hip intra-articular injection posterior approach; Dr. Madera    Orthopedics:  -right knee steroid injection with Dr. Winters on  11/09/2022 with about 3-4 months pain relief  -right wrist injection in May 2021 with Dr. Douglas       Review of Systems:  GENERAL:  No weight loss, malaise or fevers.  HEENT:   No recent changes in vision or hearing  NECK:  Negative for lumps, no difficulty with swallowing.  RESPIRATORY:  Negative for cough, wheezing or shortness of breath, patient denies any recent URI.  CARDIOVASCULAR:  Negative for chest pain or palpitations.  GI:  Negative for abdominal discomfort, blood in stools or black stools or change in bowel habits.  MUSCULOSKELETAL:  See HPI.  SKIN:  Negative for lesions, rash, and itching.  PSYCH:  No mood disorder or recent psychosocial stressors.   HEMATOLOGY/LYMPHOLOGY:  Negative for prolonged bleeding, bruising easily or swollen nodes.    NEURO:   No history of syncope, paralysis, seizures or tremors.  All other reviewed and negative other than HPI.        OBJECTIVE:    Physical Exam:  Vitals:    09/25/24 1051   BP: (!) 160/79   Pulse: 71   Weight: 102.1 kg (225 lb 1.4 oz)   Height: 6' (1.829 m)   PainSc:   5   PainLoc: Back   Body mass index is 30.53 kg/m².   (reviewed on 9/25/2024)    GENERAL: Well appearing, in no acute distress, alert and oriented x3.  PSYCH:  Mood and affect appropriate.  SKIN: Skin color, texture, turgor normal, no rashes or lesions.  HEAD/FACE:  Normocephalic, atraumatic.    PULM: No evidence of respiratory difficulty, symmetric chest rise.    BACK:  Positive shopping cart sign.  Well-healed 5 in lower lumbar vertical incision.  SLR is Equivocal to radicular pain on right. No pain to palpation over the facet joints of the lumbar spine or spinous processes.  Reduced range of motion with pain reproduction.  EXTREMITIES:  Peripheral joint range of motion is reduced with pain with instability noted bilateral lower extremities. No deformities, edema, or skin discoloration. Right wrist: TTP over medial wrist.  MUSCULOSKELETAL: Able to stand on heels but not toes secondary to  history of broken toes.     There is no pain with palpation over the sacroiliac joints bilaterally.  Gaenslen's, Distraction/Compression.  Pain with internal/external rotation of right hip.  Fabere's positive on the right.  Facet loading test is negative bilaterally.      Right Knee: pain with extension and flexion. TTP diffusely. Crepitus Present. No pain on left.    BUE & BLE strength is normal and symmetric.  No atrophy or tone abnormalities are noted.    RIGHT Lower extremity: Hip flexion 5/5, Hip Abduction 5/5, Hip Adduction 5/5, Knee extension 5/5, Knee flexion 5/5, Ankle dorsiflexion5/5, Extensor hallucis longus 5/5, Ankle plantarflexion 5/5  LEFT Lower extremity:  Hip flexion 5/5, Hip Abduction 5/5,Hip Adduction 5/5, Knee extension 5/5, Knee flexion 5/5, Ankle dorsiflexion 5/5, Extensor hallucis longus 5/5, Ankle plantarflexion 5/5  -Normal testing knee (patellar) jerk and ankle (achilles) jerk    NEURO: BUE & BLE coordination and muscle stretch reflexes are physiologic and symmetric. No loss of sensation is noted.  GAIT: normal.          Imaging/ Diagnostic Studies/ Labs (Reviewed on 9/25/2024):    09/03/2024 MRI Lumbar Spine W WO Contrast   COMPARISON:  Plain films from 11/14/2022    FINDINGS:  There is 1-2 mm of anterolisthesis L3 on L4 within intradiscal spacer present at the L3-4 level.  Pedicle screws fixation rods also present bilaterally at the L3-4 level.  The vertebral body heights are well maintained, with no fracture.  No marrow signal abnormality suspicious for an infiltrative process.  No abnormal enhancement seen on the postcontrast images.    The conus medullaris terminates at approximately the superior endplate of L2.  The adjacent soft tissue structures show no significant abnormalities.  There is disc desiccation noted throughout the lumbar spine with moderate disc space narrowing seen at L5-S1 and more mild disc space narrowing seen at the remaining levels.    L1-L2: No significant  central canal or neural foraminal narrowing.    L2-L3: Minimal diffuse disc bulge along with bilateral ligamentum flavum hypertrophy resulting in relatively mild narrowing of the central canal at L2-3.  No neural foraminal canal narrowing.    L3-L4: No significant central canal or neural foraminal narrowing. Postoperative changes seen at this level - L3-4.    L4-L5:  Diffuse disc bulge slightly more prominent in the right paracentral region along with moderate bilateral facet arthropathy resulting in relatively mild narrowing of the central canal at L4-5 and mild narrowing of either L4-5 neural foraminal canal right greater than the left.    L5-S1:  Mild-to-moderate bilateral facet arthropathy along with disc space narrowing seen at this level resulting in no significant central canal narrowing.  The bilateral L5-S1 neural foraminal canals appear to be mildly narrowed.  Impression:   1. Postoperative and mild degenerative changes of the lumbar spine as detailed above.  No high-grade central canal or neural foraminal canal narrowing suggested.      05/11/21 X-ray Knee Ortho Bilateral with Flexion  COMPARISON:  December 14, 2017  FINDINGS:  Bilateral tricompartment degenerative changes.  There is increased narrowing medial compartments.  No fracture, dislocation or effusions.  Soft tissues within normal limits.  Impression  Bilateral degenerative change without fracture or dislocation.       12/20/17 MRI Lumbar Spine W  WO Contrast  FINDINGS:  Since the previous study there has been a laminectomy at L4-L5. There is enhancement of granulation tissue at the operative site.  No abnormal enhancement surrounds the exiting nerve roots.  The thecal sac measures 9 mm AP at this level.  There is slight mass effect upon the lateral recess without definite compression of the descending L5 nerve roots. There is a synovial cyst with peripheral enhancement projecting centrally and inferiorly from the left facet joint at L3-L4 that  demonstrates peripheral enhancement.  It causes mild spinal stenosis at the level of L4 and is slightly larger than on the previous study from May 2016. The conus medullaris terminates at the level ofL1-L2. No abnormal signal within the conus. Intervertebral disc levels are as follows:    T12-L1 disc: No significant disc pathology. No spinal canal or neural foraminal stenosis.    L1-L2 disc : No significant disc pathology. No spinal canal or neural foraminal stenosis.    L2-L3 disc: Normal disc height with mild degenerative facet changes and thickening of ligamentum flavum.  No significant canal or foraminal stenosis.    L3-L4 disc: Partial distal desiccation with a broad-based posterior disc bulge.  There is a far left lateral annular fissure of the disc.  Moderate to severe degenerative facet change with bilateral facet joint effusions.  The thecal sac measures 10 mm AP but is about 50% of normal cross-sectional area.  Disc material bulges into the floor of the left exit foramen and causes mild to moderate left foraminal stenosis.  There is minimal right foraminal stenosis.  Additionally, there is a synovial cyst projects centrally and inferiorly from the left facet joint.  The synovial cyst demonstrates peripheral enhancement and measures 11 mm craniocaudal by 11 mm transverse by 5 mm AP.  It is best demonstrated on axial T2 series 6 image 23.  It causes mild thecal sac stenosis without definite neural compression.  There is a smaller synovial cyst projecting toward the left as well, remote from any neural structures.    L4-L5 disc: Level of interval laminectomy.  There is enhancing granulation tissue at the operative site without enhancement surrounding the exiting nerve roots.  The thecal sac measures 9 mm AP.  There is mild mass effect upon the lateral recesses without definite compression no descending L5 nerve roots.  Minimal bilateral foraminal stenosis.    L5-S1 disc: Partial does desiccation and disc  space height loss.  Height loss is most severe toward the left where there are marginal osteophytes.  Osteophyte material encroaches into the floor of the left exit foramen causing moderate to severe left foraminal stenosis.  The right neural foramen is minimally narrowed.  Moderate degenerative facet change.  The thecal sac measures 12 mm AP.  IMPRESSION:      1.  There has been an interval laminectomy at L4-L5.  There is enhancing granulation tissue at the operative site.  There is diminished spinal stenosis at this level compared to the previous exam.    2.  Enhancement surrounds a synovial cyst that projects centrally and inferiorly from the left L3-L4 facet joint and causes mild spinal stenosis.  The cyst has increased in size compared to the previous examination.    3.  There is mild to moderate spinal stenosis at L3-L4 where the thecal sac is about 50% of normal cross-sectional area, predominantly related to hypertrophy of the posterior elements.    4.  There is a far left lateral annular fissure of L3-L4 disc.    5.  Moderate to severe left-sided foraminal stenosis at L5-S1.       11/14/22 X-Ray Lumbar Spine AP And Lateral  FINDINGS:  Prior posterior fusion of L3-L4 with interbody spacer.  Normal alignment.  No evidence to suggest hardware failure.  Remaining lumbar vertebral bodies are normal in height and alignment.  There is mild to moderate multilevel degenerative disc disease most pronounced at L5-S1.  SI joints are intact.       11/18/19 X-Ray Cervical Spine AP And Lateral  FINDINGS:  There is visualization of C7-T1 disc level on the lateral view.  Interval progression multilevel degenerative changes throughout the C-spine.  Anterior and posterior marginal spurring with concomitant varying degrees of loss of disc height throughout the C-spine.  No acute fracture or subluxation.  C1-2 articulation appears within normal limits allowing for rotation.  Impression  Interval progression multilevel cervical  spondylosis without acute fracture or subluxation.  Follow-up and/or further evaluation as warranted.        ASSESSMENT: 83 y.o. year old male with lower back pain, consistent with     1. Right lumbar radiculopathy  Case Request-RAD/Other Procedure Area: Right L4/5 + L5/S1 TF GAEL      2. Arthralgia of right hip  X-Ray Hips Bilateral 2 View Inc AP Pelvis      3. History of left hip replacement        4. Chronic pain of right knee            PLAN:   - Interventions:     -Schedule right L4/5 + L5/S1 TF GAEL. Patient is not taking prescription blood thinners or ASA.      -Consider right IA hip injection.    - Anticoagulation use: no no anticoagulation    - Medications:  -Refill Lyrica (pregabalin) 150mg BID x 90 day supply. We previously discussed potential side effects of this medication which may include drowsiness,dizziness, dry mouth, constipation or peripheral edema.     - Continue topical hemp seed oil topically for knee and back.  - Continue topical diclofenac 1% gel to apply 2g topically up to 4 times per day PRN.   - Continue topical lidocaine 2.5%-prilocaine 2.5% topical cream Q6H PRN topically; can alternate with Voltaren gel.       - LA  reviewed and appropriate.       - Therapy: We have discussed continuing learned exercises learned at physical therapy to help manage the patient/s painful condition. The patient was previously counseled that muscle strengthening will improve the long term prognosis in regards to pain and may also help increase range of motion and mobility.  Patient has continued conventional land-based physical therapy from 05/03/2024, 07/03/2024, twice weekly sessions--13 sessions total.    -Imaging:  MRI Lumbar Spine W WO Contrast (09/03/2024) reviewed: Postoperative and mild degenerative changes of the lumbar spine as detailed above.  No high-grade central canal or neural foraminal canal narrowing suggested.    - Referrals:   -Continue follow-up with Lauren Leary PA-C/   [place]/ Dr. Schuler (Orthopedics): knee pain.  S/p right knee steroid in on 8/2/24. Consider workup with Orthopedics for right hip replacement if pain doesn't improve with hip injection/ GAEL.    -Continue follow-up with Dr. Douglas:  Extensor carpi ulnaris tendonitis.    - Follow up visit:        Future Appointments   Date Time Provider Department Center   9/25/2024 12:30 PM PROVIDER URGENT CARE, [facility]   10/7/2024 10:00 AM Norah Garcia MD [facility] PHYS [place]   11/1/2024 11:00 AM Yasmine Calvillo MD [facility] CARDIO [place]   11/5/2024 10:00 AM Debo Nguyễn NP ON UROLOGY [facility]   11/6/2024  1:40 PM Leatha Franks PA-C [facility] INT SHE[place]   11/13/2024  8:40 AM Debo Nguyễn NP [facility] UROLOGY  Medical C   11/18/2024  8:20 AM Emmanuel Schuler MD ON ORTHO [facility]   11/19/2024  9:20 AM Martin Machuca MD ON PULMSVC [facility] C   12/19/2024  8:00 AM Placido Ugalde MD [facility] NEURO [place]       - Patient Questions: Answered all of the patient's questions regarding diagnosis, therapy, and treatment.    - This condition does not require this patient to take time off of work, and the primary goal of our Pain Management services is to improve the patient's functional capacity.   - I discussed the risks, benefits, and alternatives to potential treatment options. All questions and concerns were fully addressed today in clinic.         Leatha Franks PA-C  Interventional Pain Management - [facility]    Disclaimer:  This note was prepared using voice recognition system and is likely to have sound alike errors that may have been overlooked even after proof reading.  Please call me with any questions.

## 2024-09-25 ENCOUNTER — OFFICE VISIT (OUTPATIENT)
Dept: URGENT CARE | Facility: CLINIC | Age: 84
End: 2024-09-25
Payer: MEDICARE

## 2024-09-25 ENCOUNTER — OFFICE VISIT (OUTPATIENT)
Dept: PAIN MEDICINE | Facility: CLINIC | Age: 84
End: 2024-09-25
Payer: MEDICARE

## 2024-09-25 ENCOUNTER — HOSPITAL ENCOUNTER (OUTPATIENT)
Dept: RADIOLOGY | Facility: HOSPITAL | Age: 84
Discharge: HOME | End: 2024-09-25
Attending: PHYSICIAN ASSISTANT
Payer: MEDICARE

## 2024-09-25 VITALS
WEIGHT: 225.06 LBS | HEIGHT: 72 IN | SYSTOLIC BLOOD PRESSURE: 160 MMHG | HEART RATE: 71 BPM | DIASTOLIC BLOOD PRESSURE: 79 MMHG | BODY MASS INDEX: 30.48 KG/M2

## 2024-09-25 VITALS
DIASTOLIC BLOOD PRESSURE: 71 MMHG | HEART RATE: 70 BPM | WEIGHT: 225.19 LBS | RESPIRATION RATE: 20 BRPM | HEIGHT: 75 IN | TEMPERATURE: 98 F | OXYGEN SATURATION: 98 % | BODY MASS INDEX: 28 KG/M2 | SYSTOLIC BLOOD PRESSURE: 151 MMHG

## 2024-09-25 DIAGNOSIS — R09.81 SINUS CONGESTION: ICD-10-CM

## 2024-09-25 DIAGNOSIS — M54.16 RIGHT LUMBAR RADICULOPATHY: Primary | ICD-10-CM

## 2024-09-25 DIAGNOSIS — R09.82 POST-NASAL DRIP: ICD-10-CM

## 2024-09-25 DIAGNOSIS — M25.551 ARTHRALGIA OF RIGHT HIP: ICD-10-CM

## 2024-09-25 DIAGNOSIS — M25.561 CHRONIC PAIN OF RIGHT KNEE: ICD-10-CM

## 2024-09-25 DIAGNOSIS — Z96.642 HISTORY OF LEFT HIP REPLACEMENT: ICD-10-CM

## 2024-09-25 DIAGNOSIS — G89.29 CHRONIC PAIN OF RIGHT KNEE: ICD-10-CM

## 2024-09-25 DIAGNOSIS — J06.9 VIRAL URI WITH COUGH: Primary | ICD-10-CM

## 2024-09-25 LAB
CTP QC/QA: YES
SARS-COV-2 AG RESP QL IA.RAPID: NEGATIVE

## 2024-09-25 PROCEDURE — 73521 X-RAY EXAM HIPS BI 2 VIEWS: CPT | Mod: 26,,, | Performed by: RADIOLOGY

## 2024-09-25 PROCEDURE — 99999 PR PBB SHADOW E&M-EST. PATIENT-LVL IV: CPT | Mod: PBBFAC,,, | Performed by: PHYSICIAN ASSISTANT

## 2024-09-25 PROCEDURE — 99214 OFFICE O/P EST MOD 30 MIN: CPT | Mod: S$GLB,,, | Performed by: NURSE PRACTITIONER

## 2024-09-25 PROCEDURE — 87811 SARS-COV-2 COVID19 W/OPTIC: CPT | Mod: QW,S$GLB,, | Performed by: NURSE PRACTITIONER

## 2024-09-25 PROCEDURE — 73521 X-RAY EXAM HIPS BI 2 VIEWS: CPT | Mod: TC

## 2024-09-25 RX ORDER — BENZONATATE 100 MG/1
100 CAPSULE ORAL 3 TIMES DAILY PRN
Qty: 30 CAPSULE | Refills: 0 | Status: SHIPPED | OUTPATIENT
Start: 2024-09-25 | End: 2024-10-05

## 2024-09-25 RX ORDER — FLUTICASONE PROPIONATE 50 MCG
1 SPRAY, SUSPENSION (ML) NASAL DAILY
Qty: 16 ML | Refills: 0 | Status: SHIPPED | OUTPATIENT
Start: 2024-09-25

## 2024-09-25 RX ORDER — LEVOCETIRIZINE DIHYDROCHLORIDE 5 MG/1
5 TABLET, FILM COATED ORAL NIGHTLY
Qty: 30 TABLET | Refills: 0 | Status: SHIPPED | OUTPATIENT
Start: 2024-09-25 | End: 2024-10-25

## 2024-09-25 NOTE — PATIENT INSTRUCTIONS
You have tested negative for COVID on our Binax test. As a follow up the recommendation is if you have symptoms within the first 7 days to retest within 48 hours. If you have NO SYMPTONS then you should test every 48 hours two more times.    Viral Illness  Most of the time, viruses cause upper respiratory infections. There is no specific treatment for the viruses that cause the common cold. Most treatments are aimed at relieving some of the symptoms of the cold but do not shorten or cure the cold. Antibiotics are not useful for treating a viral illness; antibiotics are only used to treat illnesses caused by bacteria, not viruses. Unnecessary use of antibiotics for the treatment of the common cold can cause allergic reactions, diarrhea, or other gastrointestinal symptoms in some patients.    How Long Will Symptoms last?  Symptoms usually worsen during the first 3-5 days, followed by  gradual improvement. Most URIs resolve within 10-14 days. Coughing often occurs during the later stages of a URI. It may be dry or produce phlegm or mucus.  Nasal congestion, post nasal dripping, or sinus pressure:   Use an oral decongestant, such as pseudoephedrine or Mucinex D to reduce nasal and sinus congestion. Decongestants are available at pharmacies   Nasal sprays, such as fluticasone (Flonase), can help relief sneezing, runny nose and nasal congestion, and may take up to one week of consistent use to manage symptoms.    Nasal rinsing with saline nasal spray or salt water (e.g., neti pot) can help relieve nasal dryness.  If you just have drainage you can take an oral antihistamine such as Zyrtec, Claritin, xyzal or Allegra     Use a warm mist humidifier or take a steamy shower to increase moisture in the air and soothe oral and nasal tissues that become irritated with dry air.   Decongestant nasal sprays ,such as Afrin, should never be used for more than two to three days; use for more than three days use can worsen nasal  "congestion.    Sore throat:    Use a pain reliever, such as acetaminophen (Tylenol) or ibuprofen (Advil).-  unless you have an allergy to them or medical condition such as stomach ulcers, kidney or liver disease or blood thinners etc for which you should not be taking these type of medications.    Gargle with salt water (1/4 tsp of salt dissolved in 8 oz of warm water). Salt water can be used as often as you like.   Throat lozenges or throat sprays (Cepacol lozenges or Chloroseptic spray) may bring some relief by increasing saliva production and lubricating your throat.   Drink plenty of fluids (e.g., water, diluted juice, tea) to soothe your throat and to stay well hydrated.   Cough   Medications that contain dextromethorphan (e.g., Robitussin DM, Mucinex DM, Delsym) may help to suppress a cough.   Tea with honey, when taken regularly, can soothe a sore throat and help suppress a cough.   If you have hypertension avoid using the "D" which is the decongestant.  Instead you can use Coricidin HBP for cold and cough symptoms.    Take prescription cough meds (pills) as prescribed; take prescription cough syrup at night as needed for cough.      If you have hypertension many OTC cough and cold medications may elevate your blood pressure,  take Coricidin HBP Cough & Cold tablets.    Strengthen your immune system:    Make sure you eat well (e.g., a healthy, balanced variety of foods).   Avoid alcohol as it can directly suppress various immune responses.   Stay away from cigarettes, and second hand smoke.   Rest as much as possible and get plenty of sleep (at least 8 hours).  Reduce risk of spreading your virus to others   URI infections are contagious; help reduce the spread.   Wash your hands frequently and/or use a hand  after touching your face.  Cover your mouth when coughing or sneezing.   Avoid sharing items like cups and lip balm     Please arrange follow up with your primary medical clinic as soon as " possible. You must understand that you've received an Urgent Care treatment only and that you may be released before all of your medical problems are known or treated. You, the patient, will arrange for follow up as instructed. If your symptoms worsen or fail to improve you should go to the Emergency Room.

## 2024-09-25 NOTE — PATIENT INSTRUCTIONS
______________________________________________________________________    Here are some brief explanations of conditions on MRI:    When the disc bulge is large enough and herniates out toward the spinal canal, it puts pressure on the sensitive spinal nerves, causing pain - including lumbar radiculopathy/ sciatica, a sharp, often shooting pain that extends from the buttocks down the back of one leg [or cervical radiculopathy when shooting from the neck into the arm]. Numbness, tingling, and weakness in one extremity is also common.  If the herniated disc is not pressing on a nerve, the patient may experience a neck or back ache or even no pain at all.       Spinal stenosis is a narrowing of the spaces within your spine, which can put pressure on the nerves that travel through the spine.  - central stenosis (area around the spinal cord) - can cause leg pain with walking, along with low back pain [or in terms of cervical issues: arm pain, along with neck pain]  - neural foraminal stenosis (area on the sides where nerves come out to come together to form large nerve that goes down your arm or leg) - can cause leg pain [or neck pain] as well.                   Facet joint syndrome is an arthritis-like condition of the spine that can be a significant source of back and neck pain. It is caused by degenerative changes to the joints between the spine bones. The cartilage inside the facet joint can break down and become inflamed, triggering pain signals in nearby nerve endings.

## 2024-09-25 NOTE — PROGRESS NOTES
"Subjective:      Patient ID: Srinaht Mcknight is a 83 y.o. male.    Vitals:  height is 6' 3" (1.905 m) and weight is 102.2 kg (225 lb 3.2 oz). His oral temperature is 98.2 °F (36.8 °C). His blood pressure is 151/71 (abnormal) and his pulse is 70. His respiration is 20 and oxygen saturation is 98%.     Chief Complaint: Sinus Problem    Srinath Mcknight is a 83 year old male whom presents to urgent care for evaluation of a dry cough for approximately 3 days. Patient reports symptoms including sinus congestion, headache and sinus pressure since last night. He denies any exposure to any ill contacts. He rates his headache 7/10 and reports it is only on the right side with right side facial pressure and right ear congestion.  He has not taken anything for his symptoms as of yet.    Sinus Problem  This is a new problem. The current episode started in the past 7 days. The problem has been gradually worsening since onset. There has been no fever. His pain is at a severity of 7/10. The pain is moderate. Associated symptoms include congestion, coughing, ear pain, headaches, a hoarse voice, shortness of breath, sinus pressure and sneezing. Pertinent negatives include no chills, diaphoresis, neck pain, sore throat or swollen glands. Past treatments include nothing.       Constitution: Negative for chills and sweating.   HENT:  Positive for ear pain, congestion and sinus pressure. Negative for sore throat.    Neck: Negative for neck pain.   Respiratory:  Positive for cough and shortness of breath.    Allergic/Immunologic: Positive for sneezing.   Neurological:  Positive for headaches.      Objective:     Physical Exam   Constitutional: He is oriented to person, place, and time. He appears well-developed. He is cooperative.   HENT:   Head: Normocephalic and atraumatic.   Ears:   Right Ear: Hearing, tympanic membrane, external ear and ear canal normal.   Left Ear: Hearing, tympanic membrane, external ear and ear canal normal. "   Nose: Congestion present. No mucosal edema or nasal deformity. No epistaxis. Right sinus exhibits maxillary sinus tenderness. Right sinus exhibits no frontal sinus tenderness. Left sinus exhibits no maxillary sinus tenderness and no frontal sinus tenderness.   Mouth/Throat: Uvula is midline, oropharynx is clear and moist and mucous membranes are normal. Mucous membranes are moist. No trismus in the jaw. Normal dentition. No uvula swelling. Oropharynx is clear.      Comments: +PND  Eyes: Conjunctivae and lids are normal.   Neck: Trachea normal and phonation normal. Neck supple.   Cardiovascular: Normal rate, regular rhythm, normal heart sounds and normal pulses.   Pulmonary/Chest: Effort normal and breath sounds normal.   Abdominal: Normal appearance and bowel sounds are normal. Soft.   Musculoskeletal: Normal range of motion.         General: Normal range of motion.   Neurological: He is alert and oriented to person, place, and time. He exhibits normal muscle tone.   Skin: Skin is warm, dry and intact.   Psychiatric: His speech is normal and behavior is normal. Judgment and thought content normal.   Nursing note and vitals reviewed.    Results for orders placed or performed in visit on 09/25/24   SARS Coronavirus 2 Antigen, POCT Manual Read   Result Value Ref Range    SARS Coronavirus 2 Antigen Negative Negative     Acceptable Yes      *Note: Due to a large number of results and/or encounters for the requested time period, some results have not been displayed. A complete set of results can be found in Results Review.         Assessment:     1. Viral URI with cough    2. Sinus congestion    3. Post-nasal drip        Plan:       Viral URI with cough  -     benzonatate (TESSALON) 100 MG capsule; Take 1 capsule (100 mg total) by mouth 3 (three) times daily as needed for Cough (Do not take more than 6 capsules in a 24 hour period.).  Dispense: 30 capsule; Refill: 0  -     fluticasone propionate (FLONASE)  50 mcg/actuation nasal spray; 1 spray (50 mcg total) by Each Nostril route once daily.  Dispense: 16 mL; Refill: 0  -     levocetirizine (XYZAL) 5 MG tablet; Take 1 tablet (5 mg total) by mouth every evening.  Dispense: 30 tablet; Refill: 0    Sinus congestion  -     SARS Coronavirus 2 Antigen, POCT Manual Read  -     fluticasone propionate (FLONASE) 50 mcg/actuation nasal spray; 1 spray (50 mcg total) by Each Nostril route once daily.  Dispense: 16 mL; Refill: 0  -     levocetirizine (XYZAL) 5 MG tablet; Take 1 tablet (5 mg total) by mouth every evening.  Dispense: 30 tablet; Refill: 0    Post-nasal drip  -     fluticasone propionate (FLONASE) 50 mcg/actuation nasal spray; 1 spray (50 mcg total) by Each Nostril route once daily.  Dispense: 16 mL; Refill: 0  -     levocetirizine (XYZAL) 5 MG tablet; Take 1 tablet (5 mg total) by mouth every evening.  Dispense: 30 tablet; Refill: 0          Medical Decision Making:   Differential Diagnosis:   Bronchitis, COPD/Asthma exacerbation, Viral upper respiratory infection, Bacterial upper respiratory infection, Pneumonia, covid19, influenza,bacterial vs viral sinusitis.     Clinical Tests:   Lab Tests: Ordered and Reviewed       <> Summary of Lab: Covid negative  Urgent Care Management:  Previous encounters independently reviewed. Discussed negative covid,results with patient whom verbalized understanding. Symptoms most consistent with viral illness, symptomatic treatment discussed. Additional plan of care as outlined above. Treatment plan as well as options and alternatives reviewed and discussed with patient. All of the patients questions and concerns were addressed.The patient verbalized understanding and agrees with the discussed plan of care. Patient remained stable and was discharged in no acute distress. Follow up with PCP if no improvement in symptoms, with symptomatic treatment, within the next 2-3 days. Follow up sooner if symptoms worsen. Report to the nearest ER  with new, worsening/concerning symptoms. Patient verbalized understanding and agrees with the discussed plan of care. Vital signs are stable, patient remained stable throughout the visit and exited the exam room in no apparent distress.             Patient Instructions   You have tested negative for COVID on our Binax test. As a follow up the recommendation is if you have symptoms within the first 7 days to retest within 48 hours. If you have NO SYMPTONS then you should test every 48 hours two more times.    Viral Illness  Most of the time, viruses cause upper respiratory infections. There is no specific treatment for the viruses that cause the common cold. Most treatments are aimed at relieving some of the symptoms of the cold but do not shorten or cure the cold. Antibiotics are not useful for treating a viral illness; antibiotics are only used to treat illnesses caused by bacteria, not viruses. Unnecessary use of antibiotics for the treatment of the common cold can cause allergic reactions, diarrhea, or other gastrointestinal symptoms in some patients.    How Long Will Symptoms last?  Symptoms usually worsen during the first 3-5 days, followed by  gradual improvement. Most URIs resolve within 10-14 days. Coughing often occurs during the later stages of a URI. It may be dry or produce phlegm or mucus.  Nasal congestion, post nasal dripping, or sinus pressure:   Use an oral decongestant, such as pseudoephedrine or Mucinex D to reduce nasal and sinus congestion. Decongestants are available at pharmacies   Nasal sprays, such as fluticasone (Flonase), can help relief sneezing, runny nose and nasal congestion, and may take up to one week of consistent use to manage symptoms.    Nasal rinsing with saline nasal spray or salt water (e.g., neti pot) can help relieve nasal dryness.  If you just have drainage you can take an oral antihistamine such as Zyrtec, Claritin, xyzal or Allegra     Use a warm mist humidifier or take a  "steamy shower to increase moisture in the air and soothe oral and nasal tissues that become irritated with dry air.   Decongestant nasal sprays ,such as Afrin, should never be used for more than two to three days; use for more than three days use can worsen nasal congestion.    Sore throat:    Use a pain reliever, such as acetaminophen (Tylenol) or ibuprofen (Advil).-  unless you have an allergy to them or medical condition such as stomach ulcers, kidney or liver disease or blood thinners etc for which you should not be taking these type of medications.    Gargle with salt water (1/4 tsp of salt dissolved in 8 oz of warm water). Salt water can be used as often as you like.   Throat lozenges or throat sprays (Cepacol lozenges or Chloroseptic spray) may bring some relief by increasing saliva production and lubricating your throat.   Drink plenty of fluids (e.g., water, diluted juice, tea) to soothe your throat and to stay well hydrated.   Cough   Medications that contain dextromethorphan (e.g., Robitussin DM, Mucinex DM, Delsym) may help to suppress a cough.   Tea with honey, when taken regularly, can soothe a sore throat and help suppress a cough.   If you have hypertension avoid using the "D" which is the decongestant.  Instead you can use Coricidin HBP for cold and cough symptoms.    Take prescription cough meds (pills) as prescribed; take prescription cough syrup at night as needed for cough.      If you have hypertension many OTC cough and cold medications may elevate your blood pressure,  take Coricidin HBP Cough & Cold tablets.    Strengthen your immune system:    Make sure you eat well (e.g., a healthy, balanced variety of foods).   Avoid alcohol as it can directly suppress various immune responses.   Stay away from cigarettes, and second hand smoke.   Rest as much as possible and get plenty of sleep (at least 8 hours).  Reduce risk of spreading your virus to others   URI infections are contagious; help reduce " the spread.   Wash your hands frequently and/or use a hand  after touching your face.  Cover your mouth when coughing or sneezing.   Avoid sharing items like cups and lip balm     Please arrange follow up with your primary medical clinic as soon as possible. You must understand that you've received an Urgent Care treatment only and that you may be released before all of your medical problems are known or treated. You, the patient, will arrange for follow up as instructed. If your symptoms worsen or fail to improve you should go to the Emergency Room.

## 2024-10-07 ENCOUNTER — OFFICE VISIT (OUTPATIENT)
Dept: PHYSICAL MEDICINE AND REHAB | Facility: CLINIC | Age: 84
End: 2024-10-07
Payer: MEDICARE

## 2024-10-07 VITALS — HEIGHT: 75 IN | BODY MASS INDEX: 28.01 KG/M2 | RESPIRATION RATE: 14 BRPM | WEIGHT: 225.31 LBS

## 2024-10-07 DIAGNOSIS — Z74.09 IMPAIRED MOBILITY: ICD-10-CM

## 2024-10-07 DIAGNOSIS — G56.03 BILATERAL CARPAL TUNNEL SYNDROME: ICD-10-CM

## 2024-10-07 DIAGNOSIS — M54.16 LUMBAR RADICULOPATHY: Primary | ICD-10-CM

## 2024-10-07 PROCEDURE — 95886 MUSC TEST DONE W/N TEST COMP: CPT | Mod: RT,S$GLB,, | Performed by: PHYSICAL MEDICINE & REHABILITATION

## 2024-10-07 PROCEDURE — 99499 UNLISTED E&M SERVICE: CPT | Mod: S$GLB,,, | Performed by: PHYSICAL MEDICINE & REHABILITATION

## 2024-10-07 PROCEDURE — 95885 MUSC TST DONE W/NERV TST LIM: CPT | Mod: 59,LT,S$GLB, | Performed by: PHYSICAL MEDICINE & REHABILITATION

## 2024-10-07 PROCEDURE — 99999 PR PBB SHADOW E&M-EST. PATIENT-LVL III: CPT | Mod: PBBFAC,,, | Performed by: PHYSICAL MEDICINE & REHABILITATION

## 2024-10-07 PROCEDURE — 95913 NRV CNDJ TEST 13/> STUDIES: CPT | Mod: S$GLB,,, | Performed by: PHYSICAL MEDICINE & REHABILITATION

## 2024-10-07 NOTE — PROGRESS NOTES
[facility]   [address] [place] [address]  Phone: [phone]        Full Name: Srinath Mcknight YOB: 1940  Patient ID: [id number]      Visit Date: 10/7/2024 10:03  Age: 83 Years 10 Months Old  Examining Physician: Norah Garcia M.D.  Referring Physician:   Reason for Referral: upper and lower exrt      Chief Complaint   Patient presents with    Numbness       HPI: This is a 83 y.o.  male being seen in clinic today for evaluation of chronic numbness/tingling in his leg and feet with weakness-worse on the right.  He is unsteady with his gait and has had balance issues and falls recently. He has known cervical and lumbar DJD/DDD and is s/p lspine surgery about 2 years ago.     History obtained from patient    Past family, medical, social, and surgical history reviewed in chart    Review of Systems:     General- denies lethargy, weight change, fever, chills  Head/neck- denies swallowing difficulties  ENT- denies hearing changes  Cardiovascular-denies chest pain  Pulmonary- denies shortness of breath  GI- denies constipation or bowel incontinence  - denies bladder incontinence+CKD  Skin- denies wounds or rashes  Musculoskeletal- + weakness, + pain  Neurologic- + numbness and tingling  Psychiatric- denies depressive or psychotic features, denies anxiety  Lymphatic-denies swelling  Endocrine- denies hypoglycemic symptoms/+DM history  All other pertinent systems negative     Physical Examination:  General: Well developed, well nourished male, NAD  HEENT:NCAT EOMI bilaterally   Pulmonary:Normal respirations    Spinal Examination: CERVICAL  Active ROM is within normal limits.  Inspection: No deformity of spinal alignment.    Spinal Examination: LUMBAR or THORACIC  Active ROM is limited in all planes  Inspection: No deformity of spinal alignment.    Slr neg    Bilateral Upper and Lower Extremities:  Pulses are 2+ at radial bilaterally.  Shoulder/Elbow/Wrist/Hand ROM    Hip/Knee/Ankle ROM wnl  Bilateral Extremities show normal capillary refill.  No signs of cyanosis, rubor, skin changes, or dysvascular changes of appendages.  Nails appear intact. Mild edema mid shin to ankle    Neurological Exam:  Cranial Nerves:  II-XII grossly intact    Manual Muscle Testing: (Motor 5=normal)  4/5 strength bilateral lower extremities    No focal atrophy is noted of either lower extremity.    Bilateral Reflexes:  iglesias's response is absent bilaterally.  No clonus at knee or ankle.    Sensation: tested to light touch  - intact in legs    Gait: bilateral canes used as walking sticks, short stride, mild forward flexed gait, limited ankle ROM      Entire procedure explained to patient prior to proceeding.  Verbal consent obtained    SNC      Nerve / Sites Rec. Site Onset Lat Peak Lat Amp Segments Distance Velocity     ms ms µV  mm m/s   R Median - Digit II (Antidromic)      Wrist Dig II 3.5 4.4 7.1 Wrist - Dig  40   L Median - Digit II (Antidromic)      Wrist Dig II 3.0 4.2 11.9 Wrist - Dig  47   R Ulnar - Digit V (Antidromic)      Wrist Dig V 3.1 3.8 8.1 Wrist - Dig V 140 46   L Ulnar - Digit V (Antidromic)      Wrist Dig V 2.8 3.6 9.7 Wrist - Dig V 140 50   R Radial - Anatomical snuff box (Forearm)      Forearm Wrist 2.0 2.6 11.3 Forearm - Wrist 100 51   L Radial - Anatomical snuff box (Forearm)      Forearm Wrist 2.0 2.4 11.5 Forearm - Wrist 100 51   R Sural - Ankle (Calf)      Calf Ankle NR NR NR Calf - Ankle 140 NR   L Sural - Ankle (Calf)      Calf Ankle NR NR NR Calf - Ankle 140 NR   R Superficial peroneal - Ankle      Lat leg Ankle NR NR NR Lat leg - Ankle 140 NR   L Superficial peroneal - Ankle      Lat leg Ankle NR NR NR Lat leg - Ankle 140 NR       MNC      Nerve / Sites Muscle Latency Amplitude Duration Rel Amp Segments Distance Lat Diff Velocity     ms mV ms %  mm ms m/s   R Median - APB      Wrist APB 4.2 8.1 6.5 100 Wrist - APB 80        Elbow APB 10.1 5.1 6.6 63.1 Elbow  - Wrist 260 5.9 44   L Median - APB      Wrist APB 4.0 7.2 6.4 100 Wrist - APB 80        Elbow APB 9.6 6.7 6.8 92.2 Elbow - Wrist 280 5.6 50   R Ulnar - ADM      Wrist ADM 3.2 6.7 5.7 100 Wrist -         B. Elbow ADM 8.4 6.9 6.5 104 B. Elbow - Wrist 285 5.2 55      A. Elbow ADM 10.6 5.6 7.0 80.3 A. Elbow - B. Elbow 110 2.2 50   L Ulnar - ADM      Wrist ADM 2.9 7.2 5.9 100 Wrist -         B. Elbow ADM 8.1 6.6 7.0 91.4 B. Elbow - Wrist 300 5.2 58      A. Elbow ADM 10.2 5.6 7.1 85.8 A. Elbow - B. Elbow 120 2.1 58   R Peroneal - EDB      Ankle EDB 7.2 0.1  100 Ankle - EDB 80        Fibular head EDB 17.0 0.0  47.7 Fibular head - Ankle 380 9.8 39      Pop fossa EDB 19.9 0.0  81 Pop fossa - Fibular head 110 2.9 38   L Peroneal - EDB      Ankle EDB 7.1 0.1 2.5 100 Ankle - EDB 80        Fibular head EDB 17.4 0.0  68.6 Fibular head - Ankle 390 10.3 38      Pop fossa EDB 19.8 0.0  70.8 Pop fossa - Fibular head 90 2.3 38   R Tibial - AH      Ankle AH 7.4 0.2  100 Ankle - Ankle 80        Pop fossa AH     Pop fossa - Ankle 520     L Tibial - AH      Ankle AH 6.3 0.5 3.1 100 Ankle - Ankle 80        Pop fossa AH 21.3 0.5  120 Pop fossa - Ankle 490 15.1 33       EMG         EMG Summary Table     Spontaneous MUAP Recruitment   Muscle IA Fib PSW Fasc Other Dur. Dur Amp Dur Polys Pattern Effort   R. Rectus femoris N None None None . _NFT_ _NFT_ N N N N Max   R. Tibialis anterior Incr None 2+ None . _NFT_ _NFT_ N N 1+ Discrete Max   R. Gastrocnemius (Medial head) Incr 1+ 2+ None . _NFT_ _NFT_ N Incr 1+ Reduced Max   R. Extensor digitorum brevis Decr None None None . _NFT_ _NFT_     Max   R. Biceps femoris (short head) Incr None None None . _NFT_ _NFT_ N Sl Incr 1+ sl red Max   L. Gastrocnemius (Medial head) Incr 1+ 1+ None . _NFT_ _NFT_ N Sl Incr 1+ Reduced Max   L. Tibialis anterior Incr 2+ 1+ None . _NFT_ _NFT_ N Sl Incr 1+ Reduced Max                                                               INTERPRETATION  -Bilateral superficial peroneal sensorynerve conduction study showed no response  -Bilateral sural sensory nerve conduction study showed no response  -Bilateral peroneal motor nerve conduction study showed prolonged latency , dec amplitude, and dec conduction velocity  -Bilateral tibial motor nerve conduction study showed prolonged latency, dec amplitude, and dec conduction velocity  -Bilateral median motor nerve conduction study showed normal latency, normal amplitude, and dec conduction velocity on the right  -Bilateral median sensory nerve conduction study showed prolonged peak latency and normal amplitude  -Bilateral ulnar motor nerve conduction study showed normal latency, amplitude, and conduction velocity  -Bilateral ulnar sensory nerve conduction study showed normal peak latency and amplitude  -Bilateral radial sensory nerve conduction study showed normal peak latency and amplitude  -Needle EMG examination performed to above mentioned muscles       IMPRESSION  ABNORMAL Study  2. There is electrodiagnostic evidence of an acute on chronic radiculopathy of the bilateral L5 and S1 nerve roots. 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  There was no evidence of a large fiber diabetic polyneuropathy    PLAN  Discussed in detail for greater than 30 minutes about diagnosis and treatment plan    1. Follow up with referring provider: Dr. Placido Stein-Cam*  2. Handouts on CTS, radic, fall prevention exercises provided. Rec referral to PT and IPM for lspine. Consider following up with surgeon.  Rec referral to Ortho for CTS  3. This study is good for one year. If symptoms worsen or do not improve, please re-consult.    Norah Garcia M.D.  Physical Medicine and Rehab

## 2024-10-07 NOTE — PRE-PROCEDURE INSTRUCTIONS
Spoke with patient regarding procedure scheduled on 10.11     Arrival time 0930     Has patient been sick with fever or on antibiotics within the last 7 days? No     Does the patient have any open wounds, sores or rashes? No     Does the patient have any recent fractures? no     Has patient received a vaccination within the last 7 days? No     Received the COVID vaccination? yes     Has the patient stopped all medications as directed? na     Does patient have a pacemaker, defibrillator, or implantable stimulator? No     Does the patient have a ride to and from procedure and someone reliable to remain with patient?  wife     Is the patient diabetic? yes     Does the patient have sleep apnea? Or use O2 at home? jimmy      Is the patient receiving sedation? yes     Is the patient instructed to remain NPO beginning at midnight the night before their procedure? yes     Procedure location confirmed with patient? Yes     Covid- Denies signs/symptoms. Instructed to notify PAT/MD if any changes.

## 2024-10-11 ENCOUNTER — HOSPITAL ENCOUNTER (OUTPATIENT)
Facility: HOSPITAL | Age: 84
Discharge: HOME | End: 2024-10-11
Attending: ANESTHESIOLOGY | Admitting: ANESTHESIOLOGY
Payer: MEDICARE

## 2024-10-11 VITALS
OXYGEN SATURATION: 98 % | HEIGHT: 75 IN | TEMPERATURE: 96 F | DIASTOLIC BLOOD PRESSURE: 78 MMHG | BODY MASS INDEX: 27.41 KG/M2 | SYSTOLIC BLOOD PRESSURE: 167 MMHG | HEART RATE: 64 BPM | WEIGHT: 220.44 LBS | RESPIRATION RATE: 18 BRPM

## 2024-10-11 DIAGNOSIS — M54.16 LUMBAR RADICULOPATHY: ICD-10-CM

## 2024-10-11 LAB — POCT GLUCOSE: 115 MG/DL (ref 70–110)

## 2024-10-11 PROCEDURE — 25500020 PHARM REV CODE 255: Performed by: ANESTHESIOLOGY

## 2024-10-11 PROCEDURE — 64483 NJX AA&/STRD TFRM EPI L/S 1: CPT | Mod: RT | Performed by: ANESTHESIOLOGY

## 2024-10-11 PROCEDURE — 82962 GLUCOSE BLOOD TEST: CPT | Performed by: ANESTHESIOLOGY

## 2024-10-11 PROCEDURE — 64483 NJX AA&/STRD TFRM EPI L/S 1: CPT | Mod: RT,,, | Performed by: ANESTHESIOLOGY

## 2024-10-11 PROCEDURE — 63600175 PHARM REV CODE 636 W HCPCS: Performed by: ANESTHESIOLOGY

## 2024-10-11 PROCEDURE — 25000003 PHARM REV CODE 250: Performed by: ANESTHESIOLOGY

## 2024-10-11 PROCEDURE — 64484 NJX AA&/STRD TFRM EPI L/S EA: CPT | Mod: RT | Performed by: ANESTHESIOLOGY

## 2024-10-11 PROCEDURE — 64484 NJX AA&/STRD TFRM EPI L/S EA: CPT | Mod: RT,,, | Performed by: ANESTHESIOLOGY

## 2024-10-11 RX ORDER — DEXAMETHASONE SODIUM PHOSPHATE 10 MG/ML
INJECTION INTRAMUSCULAR; INTRAVENOUS
Status: DISCONTINUED | OUTPATIENT
Start: 2024-10-11 | End: 2024-10-11 | Stop reason: HOSPADM

## 2024-10-11 RX ORDER — BUPIVACAINE HYDROCHLORIDE 2.5 MG/ML
INJECTION, SOLUTION EPIDURAL; INFILTRATION; INTRACAUDAL
Status: DISCONTINUED | OUTPATIENT
Start: 2024-10-11 | End: 2024-10-11 | Stop reason: HOSPADM

## 2024-10-11 RX ORDER — SODIUM BICARBONATE 1 MEQ/ML
SYRINGE (ML) INTRAVENOUS
Status: DISCONTINUED | OUTPATIENT
Start: 2024-10-11 | End: 2024-10-11 | Stop reason: HOSPADM

## 2024-10-11 RX ORDER — FENTANYL CITRATE 50 UG/ML
INJECTION, SOLUTION INTRAMUSCULAR; INTRAVENOUS
Status: DISCONTINUED | OUTPATIENT
Start: 2024-10-11 | End: 2024-10-11 | Stop reason: HOSPADM

## 2024-10-11 NOTE — DISCHARGE SUMMARY
Discharge Note  Short Stay      SUMMARY     Admit Date: 10/11/2024    Attending Physician: Hossein Brooks MD        Discharge Physician: Hossein Brooks MD        Discharge Date: 10/11/2024 11:00 AM    Procedure(s) (LRB):  Right L4/5 + L5/S1 TF GAEL (Right)    Final Diagnosis: Right lumbar radiculopathy [M54.16]    Disposition: Home or self care    Patient Instructions:   Current Discharge Medication List        CONTINUE these medications which have NOT CHANGED    Details   losartan (COZAAR) 50 MG tablet Take 1 tablet (50 mg total) by mouth once daily.  Qty: 90 tablet, Refills: 3    Associated Diagnoses: Hypertension associated with diabetes      metFORMIN (GLUCOPHAGE) 1000 MG tablet TAKE 1 TABLET TWICE DAILY WITH MEALS  Qty: 180 tablet, Refills: 1      metoprolol succinate (TOPROL-XL) 50 MG 24 hr tablet TAKE 1 TABLET EVERY DAY  Qty: 90 tablet, Refills: 3    Comments: .      allopurinoL (ZYLOPRIM) 100 MG tablet TAKE 1 TABLET ONE TIME DAILY  Qty: 90 tablet, Refills: 0      blood glucose control, low Soln by Misc.(Non-Drug; Combo Route) route.      blood-glucose meter (TRUE METRIX AIR GLUCOSE METER) kit USE AS DIRECTED  Qty: 1 each, Refills: 0      clonazePAM (KLONOPIN) 0.5 MG tablet Take 3 tablets (1.5 mg total) by mouth every evening.  Qty: 270 tablet, Refills: 0    Associated Diagnoses: Controlled REM sleep behavior disorder      clotrimazole (LOTRIMIN) 1 % cream Apply topically 2 (two) times daily. for 10 days  Qty: 60 g, Refills: 0    Associated Diagnoses: Candidiasis      cyanocobalamin, vitamin B-12, 1,000 mcg Subl Place 1,000 mcg under the tongue once daily.  Qty: 90 tablet, Refills: 3    Associated Diagnoses: Nutritional anemia      diclofenac sodium (VOLTAREN) 1 % Gel Apply 2 g topically 4 (four) times daily as needed (pain).  Qty: 200 g, Refills: 2    Associated Diagnoses: Chronic pain of right knee      fluticasone propionate (FLONASE) 50 mcg/actuation nasal spray 1 spray (50 mcg total) by Each Nostril route  once daily.  Qty: 16 mL, Refills: 0    Associated Diagnoses: Viral URI with cough; Sinus congestion; Post-nasal drip      GARLIC EXTRACT ORAL Take 1 tablet by mouth once daily. Per Negin LIAO      lactulose (CHRONULAC) 10 gram/15 mL solution SMARTSI Milliliter(s) By Mouth Daily      levocetirizine (XYZAL) 5 MG tablet Take 1 tablet (5 mg total) by mouth every evening.  Qty: 30 tablet, Refills: 0    Associated Diagnoses: Viral URI with cough; Sinus congestion; Post-nasal drip      melatonin 10 mg Tab Take 1.5 tablets (15 mg total) by mouth every evening.  Qty: 45 tablet, Refills: 11    Associated Diagnoses: Controlled REM sleep behavior disorder      mometasone 0.1% (ELOCON) 0.1 % cream Apply topically once daily.  Qty: 50 g, Refills: 2    Associated Diagnoses: Pruritus      multivitamin (THERAGRAN) per tablet Take 1 tablet by mouth once daily.      pramipexole (MIRAPEX) 0.125 MG tablet Take 1 tablet (0.125 mg total) by mouth every evening.  Qty: 90 tablet, Refills: 2    Associated Diagnoses: PLMD (periodic limb movement disorder)      pravastatin (PRAVACHOL) 40 MG tablet TAKE 1 TABLET EVERY DAY  Qty: 90 tablet, Refills: 0      sildenafiL (VIAGRA) 100 MG tablet Take 1 tablet (100 mg total) by mouth daily as needed for Erectile Dysfunction.  Qty: 15 tablet, Refills: 5    Associated Diagnoses: Erectile dysfunction due to arterial insufficiency      solifenacin (VESICARE) 10 MG tablet Take 1 tablet (10 mg total) by mouth once daily.  Qty: 30 tablet, Refills: 11      TRUE METRIX GLUCOSE TEST STRIP Strp USE AS DIRECTED TO CHECK SUGARS  Qty: 300 strip, Refills: 3    Associated Diagnoses: Type 2 diabetes mellitus with diabetic polyneuropathy, without long-term current use of insulin      TRUEPLUS LANCETS 33 gauge Misc USE AS DIRECTED TO CHECK SUGARS  Qty: 300 each, Refills: 3                 Discharge Diagnosis: Right lumbar radiculopathy [M54.16]  Condition on Discharge: Stable with no complications to procedure    Diet on Discharge: Same as before.  Activity: as per instruction sheet.  Discharge to: Home with a responsible adult.  Follow up: 2-4 weeks       Please call the office at [phone] if you experience any weakness or loss of sensation, fever > 101.5, pain uncontrolled with oral medications, persistent nausea/vomiting/or diarrhea, redness or drainage from the incisions, or any other worrisome concerns. If physician on call was not reached or could not communicate with our office for any reason please go to the nearest emergency department

## 2024-10-11 NOTE — OP NOTE
Srinath Mcknight  83 y.o. male      Vitals:    10/11/24 0951   BP: 133/67   Pulse: 75   Resp: 15   Temp: 96.3 °F (35.7 °C)     Procedure Date: 10/11/2024        INFORMED CONSENT: The procedure, risks, benefits and options were discussed with patient. There are no contraindications to the procedure. The patient expressed understanding and agreed to proceed. The personnel performing the procedure was discussed. I verify that I personally obtained consent prior to the start of the procedure and the signed consent can be found on the patient's chart.       Anesthesia:   Conscious sedation provided by M.D    The patient was monitored with continuous pulse oximetry, EKG, and intermittent blood pressure monitors.  The patient was hemodynamically stable throughout the entire process was responsive to voice, and breathing spontaneously.  Supplemental O2 was provided at 2L/min via nasal cannula.  Patient was comfortable for the duration of the procedure. (See nurse documentation and case log for sedation time)    There was a total of 0mg IV Midazolam and 50mcg Fentanyl titrated for the procedure    Pre Procedure diagnosis: Right lumbar radiculopathy [M54.16]  Post-Procedure diagnosis: SAME     Complications: None    Specimens: None      DESCRIPTION OF PROCEDURE: The patient was brought to the procedure room. IV access was obtained prior to the procedure. The patient was positioned prone on the fluoroscopy table. Continuous hemodynamic monitoring was initiated including blood pressure, EKG, and pulse oximetry. . The skin was prepped with chlorhexidine and draped in a sterile fashion.      The  L4/5 and  L5/S1 transforaminal spaces were identified with fluoroscopy in the  AP, oblique, and lateral views.  A 22 gauge spinal quinke needle was then advanced into the area of the trans foraminal spaces right with confirmation of proper needle position using AP, oblique, and lateral fluoroscopic views. Once the needle tip was in the  area of the transforaminal space, and there was no blood, CSF or paraesthesias,  1.5 mL of Omnipaque 300mg/ml was injected on right for a total of 3mL.  Fluoroscopic imaging in the AP and lateral views revealed a clear outline of the spinal nerve with proximal spread of agent through the neural foramen into the epidural space. A total combination of 2mL of Bupivacaine and 10 mg dexamethasone was injected on each side and at each level with displacement of the contrast dye confirming that the medication went into the area of the transforaminal spaces right. A sterile bandaid was applied.   Patient tolerated the procedure well.    Patient was taken back to the recovery room for further observation.     The patient was discharged to home in stable condition

## 2024-10-21 RX ORDER — PRAVASTATIN SODIUM 40 MG/1
TABLET ORAL
Qty: 90 TABLET | Refills: 0 | Status: SHIPPED | OUTPATIENT
Start: 2024-10-21

## 2024-10-21 RX ORDER — ALLOPURINOL 100 MG/1
100 TABLET ORAL
Qty: 90 TABLET | Refills: 0 | Status: SHIPPED | OUTPATIENT
Start: 2024-10-21

## 2024-10-21 NOTE — TELEPHONE ENCOUNTER
Refill Routing Note   Medication(s) are not appropriate for processing by [facility] for the following reason(s):        Required labs outdated    [facility] action(s):  Defer     Requires labs : Yes             Appointments  past 12m or future 3m with PCP    Date Provider   Last Visit   12/18/2023 Yeison Wilder MD   Next Visit   Visit date not found Yeison Wilder MD   ED visits in past 90 days: 0        Note composed:4:35 AM 10/21/2024

## 2024-10-21 NOTE — TELEPHONE ENCOUNTER
Care Due:                  Date            Visit Type   Department     Provider  --------------------------------------------------------------------------------                                EP -                              PRIMARY      [facility] INTERNAL  Last Visit: 12-      CARE ([facility])   MEDICINE       Yeison Wilder  Next Visit: None Scheduled  None         None Found                                                            Last  Test          Frequency    Reason                     Performed    Due Date  --------------------------------------------------------------------------------    CBC.........  12 months..  allopurinoL..............  12- 12-    CMP.........  12 months..  allopurinoL, losartan,     10-   10-                             pravastatin..............    HBA1C.......  6 months...  metFORMIN................  04-   10-    Lipid Panel.  12 months..  pravastatin..............  10-   10-    Uric Acid...  12 months..  allopurinoL..............  Not Found    Overdue    [employer] Embedded Care Due Messages. Reference number: [id number].   10/21/2024 1:25:10 AM CDT

## 2024-11-01 DIAGNOSIS — E11.59 HYPERTENSION ASSOCIATED WITH DIABETES: ICD-10-CM

## 2024-11-01 DIAGNOSIS — I70.0 AORTIC CALCIFICATION: Primary | Chronic | ICD-10-CM

## 2024-11-01 DIAGNOSIS — I15.2 HYPERTENSION ASSOCIATED WITH DIABETES: ICD-10-CM

## 2024-11-05 ENCOUNTER — OFFICE VISIT (OUTPATIENT)
Dept: UROLOGY | Facility: CLINIC | Age: 84
End: 2024-11-05
Payer: MEDICARE

## 2024-11-05 ENCOUNTER — TELEPHONE (OUTPATIENT)
Dept: PAIN MEDICINE | Facility: CLINIC | Age: 84
End: 2024-11-05
Payer: MEDICARE

## 2024-11-05 VITALS
SYSTOLIC BLOOD PRESSURE: 115 MMHG | RESPIRATION RATE: 16 BRPM | HEIGHT: 75 IN | TEMPERATURE: 98 F | BODY MASS INDEX: 27.41 KG/M2 | WEIGHT: 220.44 LBS | DIASTOLIC BLOOD PRESSURE: 72 MMHG | HEART RATE: 85 BPM

## 2024-11-05 DIAGNOSIS — N39.41 URGE INCONTINENCE OF URINE: Primary | ICD-10-CM

## 2024-11-05 LAB
BILIRUB UR QL STRIP: NEGATIVE
GLUCOSE UR QL STRIP: NEGATIVE
KETONES UR QL STRIP: NEGATIVE
LEUKOCYTE ESTERASE UR QL STRIP: POSITIVE
PH, POC UA: 5.5
POC BLOOD, URINE: NEGATIVE
POC NITRATES, URINE: NEGATIVE
POC RESIDUAL URINE VOLUME: 68 ML (ref 0–100)
PROT UR QL STRIP: NEGATIVE
SP GR UR STRIP: 1.02 (ref 1–1.03)
UROBILINOGEN UR STRIP-ACNC: 0.2 (ref 0.3–2.2)

## 2024-11-05 PROCEDURE — 1159F MED LIST DOCD IN RCRD: CPT | Mod: HCNC,CPTII,S$GLB, | Performed by: NURSE PRACTITIONER

## 2024-11-05 PROCEDURE — 1125F AMNT PAIN NOTED PAIN PRSNT: CPT | Mod: HCNC,CPTII,S$GLB, | Performed by: NURSE PRACTITIONER

## 2024-11-05 PROCEDURE — 99999 PR PBB SHADOW E&M-EST. PATIENT-LVL V: CPT | Mod: PBBFAC,HCNC,, | Performed by: NURSE PRACTITIONER

## 2024-11-05 PROCEDURE — 3078F DIAST BP <80 MM HG: CPT | Mod: HCNC,CPTII,S$GLB, | Performed by: NURSE PRACTITIONER

## 2024-11-05 PROCEDURE — 3074F SYST BP LT 130 MM HG: CPT | Mod: HCNC,CPTII,S$GLB, | Performed by: NURSE PRACTITIONER

## 2024-11-05 PROCEDURE — 81003 URINALYSIS AUTO W/O SCOPE: CPT | Mod: QW,HCNC,S$GLB, | Performed by: NURSE PRACTITIONER

## 2024-11-05 PROCEDURE — 3288F FALL RISK ASSESSMENT DOCD: CPT | Mod: HCNC,CPTII,S$GLB, | Performed by: NURSE PRACTITIONER

## 2024-11-05 PROCEDURE — 1101F PT FALLS ASSESS-DOCD LE1/YR: CPT | Mod: HCNC,CPTII,S$GLB, | Performed by: NURSE PRACTITIONER

## 2024-11-05 PROCEDURE — 99214 OFFICE O/P EST MOD 30 MIN: CPT | Mod: HCNC,S$GLB,, | Performed by: NURSE PRACTITIONER

## 2024-11-05 PROCEDURE — 1160F RVW MEDS BY RX/DR IN RCRD: CPT | Mod: HCNC,CPTII,S$GLB, | Performed by: NURSE PRACTITIONER

## 2024-11-05 PROCEDURE — 51798 US URINE CAPACITY MEASURE: CPT | Mod: HCNC,S$GLB,, | Performed by: NURSE PRACTITIONER

## 2024-11-05 RX ORDER — ALFUZOSIN HYDROCHLORIDE 10 MG/1
10 TABLET, EXTENDED RELEASE ORAL
Qty: 30 TABLET | Refills: 11 | Status: SHIPPED | OUTPATIENT
Start: 2024-11-05 | End: 2025-11-05

## 2024-11-05 NOTE — PROGRESS NOTES
Chief Complaint:   Urinary urge      HPI:   Patient is presenting as a follow-up to VESIcare.  Myrbetriq was discontinued secondary to high co-pay, patient was prescribed VESIcare.  States that nocturia has decreased to once nightly but still continues to have urinary urge and incontinence during the day.  Urine in clinic is negative and PVR is 67 mL.  Was also prescribed tamsulosin for a dual treatment, however, had lightheadedness with tamsulosin.  08/05/2024  Patient is a 83-year-old male that was prescribed Myrbetriq for nocturia.  Patient states that Myrbetriq resolved his urinary frequency and nocturia, however, can not afford the co-pay.  Urine in clinic is negative and PVR is 14 mL.  Is requesting information on treatment for erectile dysfunction.  06/03/2024  Patient is presenting as a follow-up to urgent care.  States that he was seen in urgent care secondary to urinary tract infection, positive urine culture indicated E coli.  Patient is uncircumcised.  Urine in clinic indicates trace leukocytes, all other parameters are negative.  PVR is 40 mL.  Reports that he return to urgent care with complaints of frequency and was prescribed Detrol 5 mg 3 times a day.  No BPH meds, tamsulosin made him feel lightheaded.  Reports that stream is strong, however, if he has not near a bathroom he will have urge incontinence.  Drinks very little water throughout the day.  11/13/2023  Patient 82-year-old male that is presenting as a follow-up for his annual exam.  Patient states that he was taken off tamsulosin secondary to feeling lightheaded.  Reports that urinary stream is strong and nocturia is once nightly.  Urine in clinic is negative and PVR was 21 mL.  No gross hematuria.  No previous elevated PSAs or prostate biopsies.  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  Recent renal ultrasound indicated a stable, simple  cyst.  11/09/2022  Patient is a 81-year-old male that is presenting for his annual exam.  Patient is currently on tamsulosin, reports all BPH symptoms are resolved.  Urine in clinic is negative.  PVR is 14 mL.  Recent PSA was normal, 0.77.  No past elevated PSAs or prostate biopsies.  Patient has erectile dysfunction and reports that TriMix is working well for him.  Patient has been followed for a right renal cyst.  Had renal ultrasound today, results are pending.  Denies gross hematuria or flank pain.  11/09/2021  Patient is a 80-year-old male that is presenting to review renal ultrasound.  Patient has a history of benign, simple renal cyst.  Renal ultrasound was stable, no change to right kidney upper pole cyst measuring 1.4 cm.  No hydronephrosis or nephrolithiasis.  Patient is due for PSA level.   Wants to discuss possible treatments for ED. No BPH meds. Nocturia once nightly, no daytime LUTS.  Allergies:  Sulfa (sulfonamide antibiotics)    Medications:  has a current medication list which includes the following prescription(s): allopurinol, blood glucose control, low, true metrix air glucose meter, clonazepam, clotrimazole, cyanocobalamin (vitamin b-12), diclofenac sodium, fluticasone propionate, garlic extract, lactulose, levocetirizine, losartan, melatonin, metformin, metoprolol succinate, mometasone 0.1%, multivitamin, pramipexole, pravastatin, sildenafil, solifenacin, true metrix glucose test strip, and trueplus lancets.    Review of Systems:  General: No fever, chills, fatigability, or weight loss.  Skin: No rashes, itching, or changes in color or texture of skin.  Chest: Denies DAMON, cyanosis, wheezing, cough, and sputum production.  Abdomen: Appetite fine. No weight loss. Denies diarrhea, abdominal pain, hematemesis, or blood in stool.  Musculoskeletal: No joint stiffness or swelling. Denies back pain.  : As above.  All other review of systems negative.    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    PSH:   has a past surgical history that includes Rotator cuff repair (Left, 2006); Shoulder arthroscopy w/ rotator cuff repair (Right, 2009); Laminectomy (07/22/2016); Hernia repair (Bilateral, 04/18/2017); Joint replacement (Left, 10/09/2017); Back surgery (2019); lumbar foraminotomy (03/2018); left elbow (10/2017); Revision total hip arthroplasty (Left, 9/11/2018); Esophagogastroduodenoscopy (N/A, 6/30/2020); Colonoscopy (N/A, 6/30/2020); Injection of joint (Right, 4/14/2023); and Transforaminal epidural injection of steroid (Right, 10/11/2024).    FamHx: family history includes Cancer (age of onset: 50) in his brother; Diabetes in his father, mother, and sister; Drug abuse in his brother; Heart disease in his father and mother; Hypertension in his father and mother; Stroke in his father.    SocHx:  reports that he has never smoked. He has never been exposed to tobacco smoke. He has never used smokeless tobacco. He reports that he does not currently use alcohol. He reports that he does not use drugs.      Physical Exam:  General: A&Ox3, no apparent distress, no deformities  Neck: No masses, normal thyroid  Lungs: normal inspiration, no use of accessory muscles  Heart: normal pulse, no arrhythmias  Abdomen: Soft, NT, ND, no masses, no hernias, no hepatosplenomegaly  Lymphatic: Neck and groin  nodes negative  Skin: The skin is warm and dry. No jaundice.  Ext: No c/c/e.  Labs/Studies:    Latest Reference Range & Units 01/21/15 08:55 02/23/16 08:27 11/28/18 08:17 06/12/19 10:19 11/04/20 09:39 11/09/21 10:06 11/07/22 10:05   Prostate Specific Antigen 0.00 - 4.00 ng/mL 0.44 0.99 0.60 0.53 0.62 0.83 0.77     Impression/Plan:   Patient to remain on VESIcare and Uroxatral was prescribed.  Return to clinic in 2-3 months for re-evaluation.

## 2024-11-06 ENCOUNTER — OFFICE VISIT (OUTPATIENT)
Dept: PAIN MEDICINE | Facility: CLINIC | Age: 84
End: 2024-11-06
Payer: MEDICARE

## 2024-11-06 VITALS
HEART RATE: 68 BPM | RESPIRATION RATE: 16 BRPM | HEIGHT: 75 IN | WEIGHT: 220 LBS | DIASTOLIC BLOOD PRESSURE: 61 MMHG | SYSTOLIC BLOOD PRESSURE: 102 MMHG | BODY MASS INDEX: 27.35 KG/M2

## 2024-11-06 DIAGNOSIS — M54.16 RIGHT LUMBAR RADICULOPATHY: Primary | ICD-10-CM

## 2024-11-06 DIAGNOSIS — R29.898 TRANSIENT LEG WEAKNESS: ICD-10-CM

## 2024-11-06 DIAGNOSIS — Z98.890 HISTORY OF LUMBAR SURGERY: ICD-10-CM

## 2024-11-06 DIAGNOSIS — R29.6 FALLS FREQUENTLY: ICD-10-CM

## 2024-11-06 PROCEDURE — 99999 PR PBB SHADOW E&M-EST. PATIENT-LVL V: CPT | Mod: PBBFAC,HCNC,, | Performed by: PHYSICIAN ASSISTANT

## 2024-11-06 RX ORDER — PREGABALIN 150 MG/1
CAPSULE ORAL
Qty: 120 CAPSULE | Refills: 0 | Status: SHIPPED | OUTPATIENT
Start: 2024-11-06 | End: 2025-01-05

## 2024-11-06 NOTE — PROGRESS NOTES
Established Patient Chronic Pain Note (Follow-up Visit)    Referring Physician: Yeison Wilder MD    PCP: Yeison Wilder MD    Chief Complaint:   Chief Complaint   Patient presents with    Follow-up     L4/5 TF GAEL follow-up.  Patient received 75% relief from injection.  Patient has pain in lower back that radiates in buttock on right side, pain that started last week in in left hip, thigh and right knee.  Pain level 7/10     low back pain with RLE radicular pain (now bilateral)  Right knee pain   Right hip pain, occasional left hip pain (h/o replacement)        Interval History (11/6/2024): Srinath Mcknight presents today for follow-up visit.  he underwent right L4/5 + L5/S1 TF GAEL on 10/11/24.  The patient reports that he is/was slightly better following the procedure.    Patient reports pain as 7/10 today.  He is having hip and knee pain as well.  He stopped taking Lyrica when he ran out of the prescription.  He does not want to do physical therapy because he can do it on his own.  He has not seen Neurosurgery in over 7 months per reporting.    Interval History (9/25/2024):  Patient presents today for follow-up visit to review lumbar MRI results.   Patient reports pain as 5/10 today.  Continues to have right leg pain along the L4 dermatome.  He feels that he has been told that the pain is generating from different sources.  He was told in the past that he would require a right hip replacement.  He has a history of left hip replacement and has no pain on that side.  He also has right knee pain, which did not improve with injections.  He also reports that he was told in the past that 1 leg is shorter than the other, which she uses a leg lift insert for; he does feel this helps some.  He finished physical therapy 2 months ago.    Interval history 08/01/2024  Srinath Mcknight is a 83 y.o. male with past medical history significant for restrictive lung disease, hyperlipidemia, hypertension, type 2 diabetes  "complicated by peripheral angiopathy and nonproliferative retinopathy, stage 3 chronic kidney disease who presents to the clinic for the evaluation of lower back and leg pain. Today he reports pain is constant and is rated a 10/10.  Of note patient has history of prior L3-4 lumbar fusion and L4-5 lumbar laminectomy with Dr. Iqbal.  Today reports pain in the lower back which can radiate down the anterior aspects of bilateral lower extremities in L3-5 distribution to the knee.  Pain feels like a ball" in the thigh.  Pain is worse on the right than on the left.  Pain is exacerbated with positional changes moving from sitting to standing and with standing and with ambulation.  Patient is able to ambulate with assistive device, cane only a few minutes before requiring rest.  He does endorse associated weakness in the lower extremities associated with his pain.  Pain is improved with sitting.  Of note patient has continued Lyrica 75 mg twice daily but he is unsure of its degree of benefit.  He reports cumbersome polypharmacy and is unsure which medications are effective. Patient has continued conventional land-based physical therapy from 05/03/2024, 07/03/2024, twice weekly sessions--13 sessions total.  He reports physical therapy is beneficial only while he is actively in the session.  Patient has plans to join a gym for exercise.  He also reports longstanding right knee pain.  Patient reports receiving corticosteroid injection and series of 3 viscosupplementation with Dr. Winters (1-3/2024) without relief exceeding 1 day.  Patient has not been evaluated for surgical intervention but secondary to history of prior numerous surgeries, he would like to avoid surgical intervention if possible.    Patient reports significant motor weakness and loss of sensations.  Patient denies night fever/night sweats, urinary incontinence, bowel incontinence, and significant weight loss.        Pain Disability Index (PDI) Score " Review:      11/6/2024     1:24 PM 9/25/2024    10:50 AM 8/1/2024     9:43 AM   Last 3 PDI Scores   Pain Disability Index (PDI) 49 35 61       Non-Pharmacologic Treatments:  Physical Therapy/Home Exercise: yes  Ice/Heat:yes  TENS: no  Acupuncture: no  Massage: yes  Chiropractic: no    Other: yes; Dr. Alcocer: L YAHIR 08/15/2017, L hip arthroplasty resection 09/04/2018      Pain Medications:  - Adjuvant Medications: Lyrica ( Pregabalin) and Topical Ointment (Voltaren Gel, Steroid cream, Anti-Inflammatory Cream, Compound cream)      Pain Procedures:     Dr. Brooks:  -04/14/2023:  right knee IA Synvisc injection  -10/11/2024: right L4/5 + L5/S1 TF GAEL       Dr. Roldan:  -02/15/2018:  Left-sided SI joint injection  -01/24/2018: Left-sided L4-5 and L5-S1 transforaminal epidural steroid injection    Dr. Hutchinson:  -06/14/2016: Left-sided L3-4 facet injection and cyst aspiration    Dr. Madera:  -05/18/2016:  Left hip intra-articular joint injection    Dr. Douglas:  -09/06/2023: Right radiocarpal injection    Dr. Winters:  -02/16/2024, 02/23/2024, 03/01/2024: Right knee intra-articular joint injection with Orthovisc  -01/02/2024: Right knee intra-articular joint injection with triamcinolone  -11/09/2022: Right knee intra-articular joint injection with triamcinolone  -11/10/2021: Right knee intra-articular joint injection with triamcinolone      Interval History (8/16/2023):   Patient presents today for follow-up visit.  Patient was last seen on 7/17/2023. At that visit, the plan was to  Schedule right genicular nerve block, but it was not approved with insurance. Patient reports pain as 7/10 today.  He continues to have chronic knee pain.  He has failed knee steroid injections with Dr. Winters.  Also reports that he has had viscosupplementation in the past with limited pain relief.  He does not believe that he is a surgical candidate.    Interval History (7/17/2023):  Srinath Mcknight presents today for follow-up visit.  Patient  was last seen on 5/11/2023. Pain in right knee has returned; it has only been about 3 months since viscosupplementation. Patient reports pain as 7/10 today.  He has been going to the gym.  He also reports right hand pain.  He saw Dr. Douglas in the past and had an injection, and the pain has returned.    Interval History (5/11/2023): Srinath Mcknight presents today for follow-up visit.  he underwent right knee Synvisc-One injection on 4/14/23 (1 month ago).  The patient reports that he is/was better following the procedure.  he reports 50% pain relief.     The changes have continued through this visit.  Patient reports pain as 8/10 today.  His main complaint today is right groin pain.  He has never had any prior treatment of his right hip in the past.    Interval History (3/23/2023):  Srinath Mcknight presents today for follow-up visit.  Patient was last seen on 2/16/2023. At that visit, the plan was to start Lyrica, only talking QHS, which seems to be helping. Patient reports pain as 6/10 today.  His main complaint today is right knee pain.  He had a right knee steroid injection with Dr. Winters on 11/09/2022 with about 3-4 months pain relief.  This was the 1st treatment he has had for his right knee in the past.  Does not have any left knee pain.  Continues to do at home physical therapy exercises, which she feels is helping his back pain.  Overall, stable; pain is better controlled than it was at the last visit.    Initial HPI (2/16/2023):  Srinath Mcknight is a 82 y.o. male with past medical history significant for hypertension, hyperlipidemia, type 2 diabetes, coronary artery disease, urinary incontinence, sickle cell trait, stage 3 chronic kidney disease, gout, obstructive sleep apnea, history of L3-4 lumbar fusion and L4-5 lumbar laminectomy who presents to the clinic for the evaluation of lower back pain.  Of note patient reports 3 prior back surgeries:  The initial several years prior in the left lower  back at [facility], and 2 lower back surgeries with Dr. Iqbal within the last 2 years.  Today patient reports pain which is constant which is rated an 8/5.  Pain is described as aching in nature.  Patient reports pain in a bandlike distribution in the lower back without radiation into the buttocks or lower extremities.  Patient does report chronic right-sided knee pain and history of total left hip arthroplasty.  Pain is exacerbated 1st thing in the morning when arising, with lumbar extension as well as with ambulation.  Patient reports he is able to ambulate only a few steps before requiring rest.  Pain is improved with lumbar support as well as sitting.  Patient also reports receiving a lumbar epidural steroid injection at the back and spine [place] without any meaningful relief.  Patient has completed conventional physical therapy last year and continues physician directed physical therapy exercises at home without meaningful relief.  Patient has been taking gabapentin 600 mg once daily without improvement in his pain.  Patient does endorse weakness in the lower extremities associated with his pain.  Patient reports significant motor weakness.  Patient denies night fever/night sweats, urinary incontinence, bowel incontinence, significant weight loss, and loss of sensations.      Pain Disability Index Review:      11/6/2024     1:24 PM 9/25/2024    10:50 AM 8/1/2024     9:43 AM   Last 3 PDI Scores   Pain Disability Index (PDI) 49 35 61       Non-Pharmacologic Treatments:  Physical Therapy/Home Exercise: yes  Ice/Heat:no  TENS: no  Acupuncture: no  Massage: no  Chiropractic: no    Other: yes; sx x 3: [facility], Dr. Iqbal x 2      Pain Medications:  - Opioids: Percocet (Oxycodone/Acetaminophen)  - Adjuvant Medications: Clonazepam (Klonopin), Neurontin (Gabapentin), and Prednisone (Deltasone)    Relevant sx:  - 3 prior back surgeries:  1st [facility], and 2 lower back surgeries with   "[facility] (March 2018: L3-L4 L5 decompression/laminotomy/micro dissection; August 2019: L3-4 revision with cage insertion and laminectomy/diskectomy)  - total left hip arthroplasty (total of 2 surgeries)      Pain Procedures:     Dr. Brooks:  -right knee Synvisc-One injection on 4/14/23 with 50% pain relief    Other providers:  -cervical medial branch facet injections in 2020 with Dr. Linares with subsequent RFA  -02/15/2018: Left-sided sacroiliac joint injection; Dr. Roldan  -01/24/2018: Left-sided L4/5 and L5/S1 transforaminal epidural steroid injection; Dr. Roldan  -06/14/2016: Left L3-4 facet joint injection; Dr. Hutchinson  -05/18/2016: Left hip intra-articular injection posterior approach; Dr. Madera    Orthopedics:  -right knee steroid injection with Dr. Winters on 11/09/2022 with about 3-4 months pain relief  -right wrist injection in May 2021 with Dr. Douglas       Review of Systems:  GENERAL:  No weight loss, malaise or fevers.  HEENT:   No recent changes in vision or hearing  NECK:  Negative for lumps, no difficulty with swallowing.  RESPIRATORY:  Negative for cough, wheezing or shortness of breath, patient denies any recent URI.  CARDIOVASCULAR:  Negative for chest pain or palpitations.  GI:  Negative for abdominal discomfort, blood in stools or black stools or change in bowel habits.  MUSCULOSKELETAL:  See HPI.  SKIN:  Negative for lesions, rash, and itching.  PSYCH:  No mood disorder or recent psychosocial stressors.   HEMATOLOGY/LYMPHOLOGY:  Negative for prolonged bleeding, bruising easily or swollen nodes.    NEURO:   No history of syncope, paralysis, seizures or tremors.  All other reviewed and negative other than HPI.        OBJECTIVE:    Physical Exam:  Vitals:    11/06/24 1326   BP: 102/61   Pulse: 68   Resp: 16   Weight: 99.8 kg (220 lb)   Height: 6' 3" (1.905 m)   PainSc:   7     Body mass index is 27.5 kg/m².   (reviewed on 11/6/2024)    GENERAL: Well appearing, in no acute distress, alert and " oriented x3.  PSYCH:  Mood and affect appropriate.  SKIN: Skin color, texture, turgor normal, no rashes or lesions.  HEAD/FACE:  Normocephalic, atraumatic.    PULM: No evidence of respiratory difficulty, symmetric chest rise.  No audible wheezing   GI: no obvious distention.     BACK:  Positive shopping cart sign.  Well-healed 5 in lower lumbar vertical incision.  SLR is Equivocal to radicular pain on right. No pain to palpation over the facet joints of the lumbar spine or spinous processes.  Reduced range of motion with pain reproduction.  EXTREMITIES:  Peripheral joint range of motion is reduced with pain with instability noted bilateral lower extremities. No deformities, edema, or skin discoloration. Right wrist: TTP over medial wrist.  MUSCULOSKELETAL: Able to stand on heels but not toes secondary to history of broken toes.     There is no pain with palpation over the sacroiliac joints bilaterally.  Gaenslen's, Distraction/Compression.  Pain with internal/external rotation of right hip.  Fabere's positive on the right.  Facet loading test is negative bilaterally.      Right Knee: pain with extension and flexion. TTP diffusely. Crepitus Present. No pain on left.    BUE & BLE strength is normal and symmetric.  No atrophy or tone abnormalities are noted.    RIGHT Lower extremity: Hip flexion 5/5, Hip Abduction 5/5, Hip Adduction 5/5, Knee extension 5/5, Knee flexion 5/5, Ankle dorsiflexion5/5, Extensor hallucis longus 5/5, Ankle plantarflexion 5/5  LEFT Lower extremity:  Hip flexion 5/5, Hip Abduction 5/5,Hip Adduction 5/5, Knee extension 5/5, Knee flexion 5/5, Ankle dorsiflexion 5/5, Extensor hallucis longus 5/5, Ankle plantarflexion 5/5  -Normal testing knee (patellar) jerk and ankle (achilles) jerk    NEURO: BUE & BLE coordination and muscle stretch reflexes are physiologic and symmetric. No loss of sensation is noted.  GAIT: normal.          Imaging/ Diagnostic Studies/ Labs (Reviewed on  11/6/2024):    09/25/2024 X-Ray Hips Bilateral 2 View Inc AP Pelvis  FINDINGS:   No fracture identified.  Intact well-positioned left total hip arthroplasty with prominent nearly bridging heterotopic ossification laterally.  No acute complication.  Joint alignment is anatomic.  Moderate to severe right hip osteoarthritis with central joint collapse, marginal osteophytes and mild subchondral cystic change.      09/03/2024 MRI Lumbar Spine W WO Contrast   COMPARISON: Plain films from 11/14/2022    FINDINGS:  There is 1-2 mm of anterolisthesis L3 on L4 within intradiscal spacer present at the L3-4 level.  Pedicle screws fixation rods also present bilaterally at the L3-4 level.  The vertebral body heights are well maintained, with no fracture.  No marrow signal abnormality suspicious for an infiltrative process.  No abnormal enhancement seen on the postcontrast images.    The conus medullaris terminates at approximately the superior endplate of L2.  The adjacent soft tissue structures show no significant abnormalities.  There is disc desiccation noted throughout the lumbar spine with moderate disc space narrowing seen at L5-S1 and more mild disc space narrowing seen at the remaining levels.    L1-L2: No significant central canal or neural foraminal narrowing.    L2-L3: Minimal diffuse disc bulge along with bilateral ligamentum flavum hypertrophy resulting in relatively mild narrowing of the central canal at L2-3.  No neural foraminal canal narrowing.    L3-L4: No significant central canal or neural foraminal narrowing. Postoperative changes seen at this level - L3-4.    L4-L5:  Diffuse disc bulge slightly more prominent in the right paracentral region along with moderate bilateral facet arthropathy resulting in relatively mild narrowing of the central canal at L4-5 and mild narrowing of either L4-5 neural foraminal canal right greater than the left.    L5-S1:  Mild-to-moderate bilateral facet arthropathy along with disc  space narrowing seen at this level resulting in no significant central canal narrowing.  The bilateral L5-S1 neural foraminal canals appear to be mildly narrowed.  Impression:   1. Postoperative and mild degenerative changes of the lumbar spine as detailed above.  No high-grade central canal or neural foraminal canal narrowing suggested.      05/11/21 X-ray Knee Ortho Bilateral with Flexion  COMPARISON: December 14, 2017  FINDINGS:  Bilateral tricompartment degenerative changes.  There is increased narrowing medial compartments.  No fracture, dislocation or effusions.  Soft tissues within normal limits.  Impression  Bilateral degenerative change without fracture or dislocation.      12/20/17 MRI Lumbar Spine W  WO Contrast  FINDINGS:  Since the previous study there has been a laminectomy at L4-L5. There is enhancement of granulation tissue at the operative site.  No abnormal enhancement surrounds the exiting nerve roots.  The thecal sac measures 9 mm AP at this level.  There is slight mass effect upon the lateral recess without definite compression of the descending L5 nerve roots. There is a synovial cyst with peripheral enhancement projecting centrally and inferiorly from the left facet joint at L3-L4 that demonstrates peripheral enhancement.  It causes mild spinal stenosis at the level of L4 and is slightly larger than on the previous study from May 2016. The conus medullaris terminates at the level ofL1-L2. No abnormal signal within the conus. Intervertebral disc levels are as follows:    T12-L1 disc: No significant disc pathology. No spinal canal or neural foraminal stenosis.    L1-L2 disc : No significant disc pathology. No spinal canal or neural foraminal stenosis.    L2-L3 disc: Normal disc height with mild degenerative facet changes and thickening of ligamentum flavum.  No significant canal or foraminal stenosis.    L3-L4 disc: Partial distal desiccation with a broad-based posterior disc bulge.  There is a  far left lateral annular fissure of the disc.  Moderate to severe degenerative facet change with bilateral facet joint effusions.  The thecal sac measures 10 mm AP but is about 50% of normal cross-sectional area.  Disc material bulges into the floor of the left exit foramen and causes mild to moderate left foraminal stenosis.  There is minimal right foraminal stenosis.  Additionally, there is a synovial cyst projects centrally and inferiorly from the left facet joint.  The synovial cyst demonstrates peripheral enhancement and measures 11 mm craniocaudal by 11 mm transverse by 5 mm AP.  It is best demonstrated on axial T2 series 6 image 23.  It causes mild thecal sac stenosis without definite neural compression.  There is a smaller synovial cyst projecting toward the left as well, remote from any neural structures.    L4-L5 disc: Level of interval laminectomy.  There is enhancing granulation tissue at the operative site without enhancement surrounding the exiting nerve roots.  The thecal sac measures 9 mm AP.  There is mild mass effect upon the lateral recesses without definite compression no descending L5 nerve roots.  Minimal bilateral foraminal stenosis.    L5-S1 disc: Partial does desiccation and disc space height loss.  Height loss is most severe toward the left where there are marginal osteophytes.  Osteophyte material encroaches into the floor of the left exit foramen causing moderate to severe left foraminal stenosis.  The right neural foramen is minimally narrowed.  Moderate degenerative facet change.  The thecal sac measures 12 mm AP.    IMPRESSION:  1.  There has been an interval laminectomy at L4-L5.  There is enhancing granulation tissue at the operative site.  There is diminished spinal stenosis at this level compared to the previous exam.    2.  Enhancement surrounds a synovial cyst that projects centrally and inferiorly from the left L3-L4 facet joint and causes mild spinal stenosis.  The cyst has  increased in size compared to the previous examination.    3.  There is mild to moderate spinal stenosis at L3-L4 where the thecal sac is about 50% of normal cross-sectional area, predominantly related to hypertrophy of the posterior elements.    4.  There is a far left lateral annular fissure of L3-L4 disc.    5.  Moderate to severe left-sided foraminal stenosis at L5-S1.      11/14/22 X-Ray Lumbar Spine AP And Lateral  FINDINGS:  Prior posterior fusion of L3-L4 with interbody spacer.  Normal alignment.  No evidence to suggest hardware failure.  Remaining lumbar vertebral bodies are normal in height and alignment.  There is mild to moderate multilevel degenerative disc disease most pronounced at L5-S1.  SI joints are intact.       11/18/19 X-Ray Cervical Spine AP And Lateral  FINDINGS:  There is visualization of C7-T1 disc level on the lateral view.  Interval progression multilevel degenerative changes throughout the C-spine.  Anterior and posterior marginal spurring with concomitant varying degrees of loss of disc height throughout the C-spine.  No acute fracture or subluxation.  C1-2 articulation appears within normal limits allowing for rotation.  Impression  Interval progression multilevel cervical spondylosis without acute fracture or subluxation.  Follow-up and/or further evaluation as warranted.        ASSESSMENT: 83 y.o. year old male with lower back pain, consistent with     1. Right lumbar radiculopathy        2. History of lumbar surgery  Ambulatory referral/consult to Neurosurgery      3. Transient leg weakness  Ambulatory referral/consult to Neurosurgery      4. Falls frequently  Ambulatory referral/consult to Neurosurgery            PLAN:   - Interventions:     - S/p right L4/5 + L5/S1 TF GAEL on 10/11/24 with some pain relief.    - Consider right IA hip + right GT bursa injection.  He wants to hold off for now.    - Anticoagulation use: no no anticoagulation    - Medications:  -Restart Lyrica  (pregabalin) 150mg BID.. We previously discussed potential side effects of this medication which may include drowsiness,dizziness, dry mouth, constipation or peripheral edema.     - Continue topical hemp seed oil topically for knee and back.  - Continue topical diclofenac 1% gel to apply 2g topically up to 4 times per day PRN.   - Continue topical lidocaine 2.5%-prilocaine 2.5% topical cream Q6H PRN topically; can alternate with Voltaren gel.       - LA  reviewed and appropriate.       - Therapy: We have discussed continuing learned exercises learned at physical therapy to help manage the patient/s painful condition. The patient was previously counseled that muscle strengthening will improve the long term prognosis in regards to pain and may also help increase range of motion and mobility.  Patient has continued conventional land-based physical therapy from 05/03/2024, 07/03/2024, twice weekly sessions--13 sessions total.  Sciatic exercises given to patient to perform at home.  Patient declined formal physical therapy referral.    Diagnostic/ Imaging: No new imaging ordered. Previous imaging reviewed.   MRI Lumbar Spine W WO Contrast (09/03/2024) reviewed: Postoperative and mild degenerative changes of the lumbar spine as detailed above.  No high-grade central canal or neural foraminal canal narrowing suggested.     - Referrals:   - Refer back to Dr. Jeffy Iqbal (Neurosurgery) at [facility] [place] for evaluation - weakness.   -Continue follow-up with Lauren Leary PA-C/ Dr. Winters/ Dr. Schuler (Orthopedics): knee pain.  S/p right knee steroid in on 8/2/24. Has follow-up next week for knee pain.     -Continue follow-up with Dr. Douglas:  Extensor carpi ulnaris tendonitis.    - Follow up visit: 2 months - discuss Lyrica effectiveness      Future Appointments   Date Time Provider Department Center   11/13/2024  8:30 AM Alek Wall MD [facility] ORTHO [facility]   11/19/2024  9:20 AM Martin Machuca MD ON  [facility]   12/19/2024  8:00 AM Placido Ugalde MD [facility] NEURO [place]   1/6/2025  9:40 AM Leatha Franks PA-C [facility] [place]   2/5/2025 11:20 AM Debo Nguyễn NP ON UROLOGY  [facility]     - Direct patient care provided: 20 minutes. Over 50% of the time was spent counseling/educating the patient. Total time spent on patient care including prep time reviewing the chart before the visit, time spent with patient during the visit, and the time spent after the visit reviewing studies, documenting note, obtaining information from collaborative providers, etc.: >40 minutes.     - Patient Questions: Answered all of the patient's questions regarding diagnosis, therapy, and treatment.    - This condition does not require this patient to take time off of work, and the primary goal of our Pain Management services is to improve the patient's functional capacity.   - I discussed the risks, benefits, and alternatives to potential treatment options. All questions and concerns were fully addressed today in clinic.         Leatha Franks PA-C  Interventional Pain Management - [facility]    Disclaimer:  This note was prepared using voice recognition system and is likely to have sound alike errors that may have been overlooked even after proof reading.  Please call me with any questions.

## 2024-11-08 DIAGNOSIS — M25.561 RIGHT KNEE PAIN, UNSPECIFIED CHRONICITY: Primary | ICD-10-CM

## 2024-11-13 ENCOUNTER — OFFICE VISIT (OUTPATIENT)
Dept: ORTHOPEDICS | Facility: CLINIC | Age: 84
End: 2024-11-13
Payer: MEDICARE

## 2024-11-13 ENCOUNTER — HOSPITAL ENCOUNTER (OUTPATIENT)
Dept: RADIOLOGY | Facility: HOSPITAL | Age: 84
Discharge: HOME | End: 2024-11-13
Attending: ORTHOPAEDIC SURGERY
Payer: MEDICARE

## 2024-11-13 VITALS — BODY MASS INDEX: 26.97 KG/M2 | HEIGHT: 75 IN | WEIGHT: 216.94 LBS

## 2024-11-13 DIAGNOSIS — M54.41 CHRONIC BILATERAL LOW BACK PAIN WITH BILATERAL SCIATICA: Chronic | ICD-10-CM

## 2024-11-13 DIAGNOSIS — M16.11 PRIMARY OSTEOARTHRITIS OF RIGHT HIP: ICD-10-CM

## 2024-11-13 DIAGNOSIS — M17.12 PRIMARY OSTEOARTHRITIS OF LEFT KNEE: Primary | ICD-10-CM

## 2024-11-13 DIAGNOSIS — M25.561 RIGHT KNEE PAIN, UNSPECIFIED CHRONICITY: ICD-10-CM

## 2024-11-13 DIAGNOSIS — Z96.642 STATUS POST TOTAL HIP REPLACEMENT, LEFT: ICD-10-CM

## 2024-11-13 DIAGNOSIS — M54.42 CHRONIC BILATERAL LOW BACK PAIN WITH BILATERAL SCIATICA: Chronic | ICD-10-CM

## 2024-11-13 DIAGNOSIS — G89.29 CHRONIC BILATERAL LOW BACK PAIN WITH BILATERAL SCIATICA: Chronic | ICD-10-CM

## 2024-11-13 DIAGNOSIS — M17.11 PRIMARY OSTEOARTHRITIS OF RIGHT KNEE: ICD-10-CM

## 2024-11-13 PROCEDURE — 73562 X-RAY EXAM OF KNEE 3: CPT | Mod: 26,59,HCNC,LT | Performed by: RADIOLOGY

## 2024-11-13 PROCEDURE — 73562 X-RAY EXAM OF KNEE 3: CPT | Mod: TC,HCNC,LT

## 2024-11-13 PROCEDURE — 73564 X-RAY EXAM KNEE 4 OR MORE: CPT | Mod: 26,HCNC,RT, | Performed by: RADIOLOGY

## 2024-11-13 PROCEDURE — 99999 PR PBB SHADOW E&M-EST. PATIENT-LVL IV: CPT | Mod: PBBFAC,HCNC,, | Performed by: ORTHOPAEDIC SURGERY

## 2024-11-14 PROBLEM — M16.11 PRIMARY OSTEOARTHRITIS OF RIGHT HIP: Status: ACTIVE | Noted: 2024-11-14

## 2024-11-14 PROBLEM — M17.11 PRIMARY OSTEOARTHRITIS OF RIGHT KNEE: Status: ACTIVE | Noted: 2024-11-14

## 2024-11-14 NOTE — PROGRESS NOTES
Subjective:       Patient ID: Srinath Mcknight is a 83 y.o. male.    Chief Complaint:   Pain of the Right Knee, Pain of the Lower Back, Pain of the Right Thigh, and Pain of the Left Thigh    History of Present Illness    CHIEF COMPLAINT:  Patient presents today for evaluation of multiple pain complaints, including lower back pain, bilateral hip pain, bilateral thigh pain, and right knee pain.    HPI:  Patient presents with multiple pain complaints affecting his lower back, both hips, both thighs, and right knee, with symptoms persisting for about 3-4 years. His right knee is worse than the left. He expresses frustration with previous medical care, stating that he has been going in circles when describing his experience with different specialists.    He uses two canes for walking due to balance issues, explaining that he tends to fall in the direction he's leaning without the support. He denies feeling dizzy or lightheaded when unsteady.    He has a history of hip replacement surgery, which required two procedures on the same hip. He has also undergone back surgeries, with two performed at the spine clinic in [place] and the first one in 2017.    Recently, he has been experiencing slurred speech for the last two weeks or more. His wife notes that he sleeps excessively.    He reports pain and burning sensations in his feet, especially at night. He describes his knee pain as feeling like sharp, pin sensations at night, stating that he has to hold it as it won't straighten out. He also experiences pain in the groin area when walking.    He has previously received injections for pain management but reports dissatisfaction with his current pain management care in [place], stating that pain management has indicated they have exhausted their treatment options.    Regarding his back pain, he experiences discomfort when getting up and walking. He has tried using a brace for support but found it ineffective, stating  that it does not provide adequate support for his upper abdominal area.    He denies feeling dizzy or lightheaded when unsteady. He denies having Parkinson's disease.    MEDICATIONS:  Patient is on Lyrica. He is taking Clonazepam at night. He is also on Mirapex.    SURGICAL HISTORY:  Patient has undergone a left hip replacement, which required a second procedure to address complications. He has had two back surgeries performed at the spine clinic in [place], with the first back surgery taking place in 2017.        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    Trouble in sleeping     Type 2 diabetes mellitus with diabetic polyneuropathy, without long-term current use of insulin     Urinary incontinence     Uses hearing aid     bilat     Past Surgical History:   Procedure Laterality Date    BACK SURGERY  2019    COLONOSCOPY N/A 6/30/2020    Procedure: COLONOSCOPY;  Surgeon: Joseph Iraheta MD;  Location: [facility] ENDO;  Service: Endoscopy;  Laterality: N/A;    ESOPHAGOGASTRODUODENOSCOPY N/A 6/30/2020    Procedure: EGD (ESOPHAGOGASTRODUODENOSCOPY);  Surgeon: Joseph Iraheta MD;  Location: [facility] ENDO;  Service: Endoscopy;  Laterality: N/A;    HERNIA REPAIR Bilateral 04/18/2017    INJECTION OF JOINT Right 4/14/2023    Procedure: right Synvisc Knee injection;  Surgeon: Hossein Brooks MD;  Location: [facility] PAIN MGT;  Service: Pain  Management;  Laterality: Right;    JOINT REPLACEMENT Left 10/09/2017    L YAHIR Dr. Alcocer    LAMINECTOMY  07/22/2016    left elbow  10/2017    procedure to remove gout    lumbar foraminotomy  03/2018    REVISION TOTAL HIP ARTHROPLASTY Left 9/11/2018    Procedure: REVISION, TOTAL ARTHROPLASTY, HIP;  Surgeon: Albaro Alcocer Sr., MD;  Location: [facility];  Service: Orthopedics;  Laterality: Left;    ROTATOR CUFF REPAIR Left 2006    SHOULDER ARTHROSCOPY W/ ROTATOR CUFF REPAIR Right 2009    TRANSFORAMINAL EPIDURAL INJECTION OF STEROID Right 10/11/2024    Procedure: Right L4/5 + L5/S1 TF GAEL;  Surgeon: Hossein Brooks MD;  Location: [facility] MGT;  Service: Pain Management;  Laterality: Right;     Family History   Problem Relation Name Age of Onset    Hypertension Mother      Heart disease Mother      Diabetes Mother      Hypertension Father      Heart disease Father      Diabetes Father      Stroke Father      Diabetes Sister      Cancer Brother  50        pancreas    Drug abuse Brother      Prostate cancer Neg Hx      Macular degeneration Neg Hx      Retinal detachment Neg Hx      Strabismus Neg Hx      Glaucoma Neg Hx      Blindness Neg Hx      Amblyopia Neg Hx      Kidney disease Neg Hx      Mental illness Neg Hx      Intellectual disability Neg Hx      COPD Neg Hx      Asthma Neg Hx       Social History     Socioeconomic History    Marital status:     Number of children: 0    Highest education level: Master's degree (e.g., MA, MS, Olegario, MEd, MSW, CARMEL)   Occupational History    Occupation: retired     Comment: teacher   Tobacco Use    Smoking status: Never     Passive exposure: Never    Smokeless tobacco: Never   Substance and Sexual Activity    Alcohol use: Not Currently    Drug use: No    Sexual activity: Not Currently     Partners: Female   Social History Narrative    . No children. Retired but some part time work - 4 hours a day. 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. Caffeine intake - sugar free cola, Rare  coffee intake. Still drives. Does have a Living Will . Has a nephew Jt Gayle, lives in [place]. Has a friend Karina Rossi, locally who could help him.      Social Drivers of Health     Financial Resource Strain: Medium Risk (8/15/2023)    Overall Financial Resource Strain (CARDIA)     Difficulty of Paying Living Expenses: Somewhat hard   Food Insecurity: No Food Insecurity (8/15/2023)    Hunger Vital Sign     Worried About Running Out of Food in the Last Year: Never true     Ran Out of Food in the Last Year: Never true   Transportation Needs: No Transportation Needs (8/15/2023)    PRAPARE - Transportation     Lack of Transportation (Medical): No     Lack of Transportation (Non-Medical): No   Physical Activity: Insufficiently Active (8/15/2023)    Exercise Vital Sign     Days of Exercise per Week: 2 days     Minutes of Exercise per Session: 30 min   Stress: No Stress Concern Present (8/15/2023)    [employer] - Occupational Stress Questionnaire     Feeling of Stress : Not at all   Housing Stability: Low Risk  (8/15/2023)    Housing Stability Vital Sign     Unable to Pay for Housing in the Last Year: No     Number of Places Lived in the Last Year: 1     Unstable Housing in the Last Year: No       Current Outpatient Medications   Medication Sig Dispense Refill    alfuzosin (UROXATRAL) 10 mg Tb24 Take 1 tablet (10 mg total) by mouth daily with breakfast. 30 tablet 11    allopurinoL (ZYLOPRIM) 100 MG tablet TAKE 1 TABLET EVERY DAY 90 tablet 0    blood glucose control, low Soln by Misc.(Non-Drug; Combo Route) route.      blood-glucose meter (TRUE METRIX AIR GLUCOSE METER) kit USE AS DIRECTED 1 each 0    clonazePAM (KLONOPIN) 0.5 MG tablet Take 3 tablets (1.5 mg total) by mouth every evening. 270 tablet 0    clotrimazole (LOTRIMIN) 1 % cream Apply topically 2 (two) times daily. for 10 days 60 g 0    cyanocobalamin, vitamin B-12, 1,000 mcg Subl Place 1,000 mcg under the tongue once  daily. 90 tablet 3    diclofenac sodium (VOLTAREN) 1 % Gel Apply 2 g topically 4 (four) times daily as needed (pain). 200 g 2    fluticasone propionate (FLONASE) 50 mcg/actuation nasal spray 1 spray (50 mcg total) by Each Nostril route once daily. 16 mL 0    GARLIC EXTRACT ORAL Take 1 tablet by mouth once daily. Per Negin LIAO      lactulose (CHRONULAC) 10 gram/15 mL solution SMARTSI Milliliter(s) By Mouth Daily      levocetirizine (XYZAL) 5 MG tablet Take 1 tablet (5 mg total) by mouth every evening. 30 tablet 0    losartan (COZAAR) 50 MG tablet Take 1 tablet (50 mg total) by mouth once daily. 90 tablet 3    melatonin 10 mg Tab Take 1.5 tablets (15 mg total) by mouth every evening. 45 tablet 11    metFORMIN (GLUCOPHAGE) 1000 MG tablet TAKE 1 TABLET TWICE DAILY WITH MEALS 180 tablet 1    metoprolol succinate (TOPROL-XL) 50 MG 24 hr tablet TAKE 1 TABLET EVERY DAY 90 tablet 3    mometasone 0.1% (ELOCON) 0.1 % cream Apply topically once daily. 50 g 2    multivitamin (THERAGRAN) per tablet Take 1 tablet by mouth once daily.      pramipexole (MIRAPEX) 0.125 MG tablet Take 1 tablet (0.125 mg total) by mouth every evening. 90 tablet 2    pravastatin (PRAVACHOL) 40 MG tablet TAKE 1 TABLET EVERY DAY 90 tablet 0    pregabalin (LYRICA) 150 MG capsule Take 1 capsule (150 mg total) by mouth every evening for 5 days, THEN 1 capsule (150 mg total) 2 (two) times daily. 120 capsule 0    sildenafiL (VIAGRA) 100 MG tablet Take 1 tablet (100 mg total) by mouth daily as needed for Erectile Dysfunction. 15 tablet 5    solifenacin (VESICARE) 10 MG tablet Take 1 tablet (10 mg total) by mouth once daily. 30 tablet 11    TRUE METRIX GLUCOSE TEST STRIP Strp USE AS DIRECTED TO CHECK SUGARS 300 strip 3    TRUEPLUS LANCETS 33 gauge Misc USE AS DIRECTED TO CHECK SUGARS 300 each 3     No current facility-administered medications for this visit.     Review of patient's allergies indicates:   Allergen Reactions    Sulfa (sulfonamide  "antibiotics)      Can't recall from 1995         Objective:      Vitals:    11/13/24 0859   Weight: 98.4 kg (216 lb 14.9 oz)   Height: 6' 3" (1.905 m)     Physical Exam    Uses two canes for ambulation, presents in a wheelchair.  Neurological: Slurred speech.  Knees:  There is no significant coronal plane deformity.  Active range of motion is 5-115 degrees on the right, and 5-100 on the left.  Anterior crepitus with active extension.  Patellar mobility is mildly limited.  Mild synovitis without effusion.  No point tenderness.  No instability to varus/valgus/Lachman's stressing.  He does have right hip pain in the groin with flexion and internal rotation of the hip which is limited.  Skin intact.  Distal neurovascular intact.  Flip test negative.      IMAGING:  Patient underwent X-rays of both knees and hips. The X-ray of the right hip revealed severe arthritis with a bone-on-bone appearance. The X-ray of the left hip, post-replacement, showed heterotopic ossification, grade 3.  A recent MRI of the lumbar spine showed no high-grade stenosis, no apparent complications from his previous surgery           Assessment/Plan   Early DJD, knees.  Advanced DJD, right hip.  Status post left YAHIR without complications.  Chronic low back pain.    Recommend wearing a lumbar support brace to help with back pain when walking or getting up.  Referred to pain management doctor, Dr. Kelly, for a reset of pain management treatment plan and to address overall pain situation, specifically the right hip pain and back pain.  He may benefit from right hip injection.  Discussed potential future right hip replacement.  Mentioned possibility of surgery to remove HO on left hip, but noted it is low priority unless it becomes the only factor limiting activities.          The patient's pathophysiology was explained in detail with reference to x-rays, models, other visual aids as appropriate.  Treatment options were discussed in detail.  Questions " were invited and answered to the patient's satisfaction.    This note was generated with the assistance of ambient listening technology. Verbal consent was obtained by the patient and accompanying visitor(s) for the recording of patient appointment to facilitate this note. I attest to having reviewed and edited the generated note for accuracy, though some syntax or spelling errors may persist. Please contact the author of this note for any clarification.     Alek Wall MD  Orthopaedic Surgery

## 2024-11-15 ENCOUNTER — OFFICE VISIT (OUTPATIENT)
Dept: PAIN MEDICINE | Facility: CLINIC | Age: 84
End: 2024-11-15
Payer: MEDICARE

## 2024-11-15 ENCOUNTER — TELEPHONE (OUTPATIENT)
Dept: PAIN MEDICINE | Facility: CLINIC | Age: 84
End: 2024-11-15
Payer: MEDICARE

## 2024-11-15 VITALS
WEIGHT: 220.88 LBS | BODY MASS INDEX: 27.46 KG/M2 | HEART RATE: 73 BPM | DIASTOLIC BLOOD PRESSURE: 74 MMHG | HEIGHT: 75 IN | SYSTOLIC BLOOD PRESSURE: 116 MMHG

## 2024-11-15 DIAGNOSIS — M47.816 LUMBAR SPONDYLOSIS: ICD-10-CM

## 2024-11-15 DIAGNOSIS — M17.11 PRIMARY OSTEOARTHRITIS OF RIGHT KNEE: ICD-10-CM

## 2024-11-15 DIAGNOSIS — M17.12 PRIMARY OSTEOARTHRITIS OF LEFT KNEE: ICD-10-CM

## 2024-11-15 DIAGNOSIS — M16.11 PRIMARY OSTEOARTHRITIS OF RIGHT HIP: ICD-10-CM

## 2024-11-15 DIAGNOSIS — M25.559 HIP PAIN, UNSPECIFIED LATERALITY: Primary | ICD-10-CM

## 2024-11-15 DIAGNOSIS — M16.11 PRIMARY OSTEOARTHRITIS OF RIGHT HIP: Primary | ICD-10-CM

## 2024-11-15 DIAGNOSIS — M25.559 HIP PAIN, UNSPECIFIED LATERALITY: ICD-10-CM

## 2024-11-15 PROCEDURE — 99999 PR PBB SHADOW E&M-EST. PATIENT-LVL IV: CPT | Mod: PBBFAC,HCNC,, | Performed by: STUDENT IN AN ORGANIZED HEALTH CARE EDUCATION/TRAINING PROGRAM

## 2024-11-15 RX ORDER — CLONAZEPAM 0.5 MG/1
0.5 TABLET ORAL 2 TIMES DAILY PRN
COMMUNITY

## 2024-11-15 NOTE — PROGRESS NOTES
[facility] - Established  Patient Evaluation    Referred by: Dr. Alek Wall   Reason for referral: Primary osteoarthritis of left knee  Primary osteoarthritis of right knee  Primary osteoarthritis of right hip     CC:   Chief Complaint   Patient presents with    Low-back Pain    Knee Pain    Leg Pain         11/15/2024     2:48 PM 11/6/2024     1:24 PM 9/25/2024    10:50 AM   Last 3 PDI Scores   Pain Disability Index (PDI) 47 49 35       Subjective 11/15/2024:   Srinath Mcknight is a 83 y.o. male who presents complaining of right hip pain that radiates into the right thigh and right knee.  He recently underwent right L4-L5 and L5-S1 transforaminal epidural steroid injection with little to no relief. He was recently evaluated by Orthopedics and found to have severe osteoarthritis in the right hip.  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  Orthopedics recommending right intra-articular hip injection.  He does have a history of total hip replacement on the left.    Patient was previously following at the [facility] pain [facility] clinic.     Initial Pain Assessment:  Location: knee   Onset: 3 years  Current Pain Score: 10/10  Daily Pain of Range: 7-8/10  Quality: Aching, Burning, Tingling, and Sharp  Radiation: lower back to the hip and thigh  Worsened by: standing for more than a few minutes and walking for more than a few minutes  Improved by: medications, rest, and sitting     Patient denies night fever/night sweats, urinary incontinence, bowel incontinence, significant weight loss, significant motor weakness, and loss of sensations.      Previous Interventions:  - 10/11/2024-L4/L5 and L5/S1 right transforaminal epidural steroid injection - no relief  - 04/14/2023-right Synvisc knee injection    Previous Therapies:  PT/OT: yes   Chiropractor:   HEP:  Yes  Relevant Surgery: yes   History of posterior lumbar fusion  Left total hip arthroplasty    Previous Medications:   - Tylenol or  NSAIDS:   - Muscle Relaxants:    - TCAs:   - SNRIs:   - Topicals:   - Anticonvulsants: Lyrica   - Opioids:   - Adjuvants:     Current Pain Medications:  Lyrica      Anticoagulation: ASA 81 mg    Review of Systems:  ROS    GENERAL:  No weight loss, malaise or fevers.  HEENT:   No recent changes in vision or hearing  NECK:  No difficulty with swallowing. No stridor.   RESPIRATORY:  Negative for cough, wheezing or shortness of breath, patient denies any recent URI.  CARDIOVASCULAR:  Negative for chest pain, leg swelling or palpitations.  GI:  Negative for abdominal discomfort, blood in stools or black stools or change in bowel habits.  MUSCULOSKELETAL:  See HPI.  SKIN:  Negative for lesions, rash, and itching.  PSYCH:  No mood disorder or recent psychosocial stressors.    HEMATOLOGY/LYMPHOLOGY:  Negative for prolonged bleeding, bruising easily or swollen nodes.  Patient is not currently taking any anti-coagulants  NEURO:   No history of headaches, syncope, paralysis, seizures or tremors.  All other reviewed and negative other than HPI.    History:  Current medications, allergies, medical history, surgical history,   family history, and social history were reviewed in the chart as marked.    Full Medication List:    Current Outpatient Medications:     alfuzosin (UROXATRAL) 10 mg Tb24, Take 1 tablet (10 mg total) by mouth daily with breakfast., Disp: 30 tablet, Rfl: 11    allopurinoL (ZYLOPRIM) 100 MG tablet, TAKE 1 TABLET EVERY DAY, Disp: 90 tablet, Rfl: 0    blood glucose control, low Soln, by Misc.(Non-Drug; Combo Route) route., Disp: , Rfl:     blood-glucose meter (TRUE METRIX AIR GLUCOSE METER) kit, USE AS DIRECTED, Disp: 1 each, Rfl: 0    clonazePAM (KLONOPIN) 0.5 MG tablet, Take 0.5 mg by mouth 2 (two) times daily as needed for Anxiety., Disp: , Rfl:     cyanocobalamin, vitamin B-12, 1,000 mcg Subl, Place 1,000 mcg under the tongue once daily., Disp: 90 tablet, Rfl: 3    diclofenac sodium (VOLTAREN) 1 % Gel, Apply  2 g topically 4 (four) times daily as needed (pain)., Disp: 200 g, Rfl: 2    fluticasone propionate (FLONASE) 50 mcg/actuation nasal spray, 1 spray (50 mcg total) by Each Nostril route once daily., Disp: 16 mL, Rfl: 0    GARLIC EXTRACT ORAL, Take 1 tablet by mouth once daily. Per [facility], Disp: , Rfl:     lactulose (CHRONULAC) 10 gram/15 mL solution, SMARTSI Milliliter(s) By Mouth Daily, Disp: , Rfl:     losartan (COZAAR) 50 MG tablet, Take 1 tablet (50 mg total) by mouth once daily., Disp: 90 tablet, Rfl: 3    melatonin 10 mg Tab, Take 1.5 tablets (15 mg total) by mouth every evening., Disp: 45 tablet, Rfl: 11    metFORMIN (GLUCOPHAGE) 1000 MG tablet, TAKE 1 TABLET TWICE DAILY WITH MEALS, Disp: 180 tablet, Rfl: 1    metoprolol succinate (TOPROL-XL) 50 MG 24 hr tablet, TAKE 1 TABLET EVERY DAY, Disp: 90 tablet, Rfl: 3    mometasone 0.1% (ELOCON) 0.1 % cream, Apply topically once daily., Disp: 50 g, Rfl: 2    multivitamin (THERAGRAN) per tablet, Take 1 tablet by mouth once daily., Disp: , Rfl:     pramipexole (MIRAPEX) 0.125 MG tablet, Take 1 tablet (0.125 mg total) by mouth every evening., Disp: 90 tablet, Rfl: 2    pravastatin (PRAVACHOL) 40 MG tablet, TAKE 1 TABLET EVERY DAY, Disp: 90 tablet, Rfl: 0    pregabalin (LYRICA) 150 MG capsule, Take 1 capsule (150 mg total) by mouth every evening for 5 days, THEN 1 capsule (150 mg total) 2 (two) times daily., Disp: 120 capsule, Rfl: 0    sildenafiL (VIAGRA) 100 MG tablet, Take 1 tablet (100 mg total) by mouth daily as needed for Erectile Dysfunction., Disp: 15 tablet, Rfl: 5    solifenacin (VESICARE) 10 MG tablet, Take 1 tablet (10 mg total) by mouth once daily., Disp: 30 tablet, Rfl: 11    TRUE METRIX GLUCOSE TEST STRIP Strp, USE AS DIRECTED TO CHECK SUGARS, Disp: 300 strip, Rfl: 3    TRUEPLUS LANCETS 33 gauge Misc, USE AS DIRECTED TO CHECK SUGARS, Disp: 300 each, Rfl: 3    clotrimazole (LOTRIMIN) 1 % cream, Apply topically 2 (two) times daily. for 10 days,  Disp: 60 g, Rfl: 0    levocetirizine (XYZAL) 5 MG tablet, Take 1 tablet (5 mg total) by mouth every evening., Disp: 30 tablet, Rfl: 0     Allergies:  Sulfa (sulfonamide antibiotics)     Medical History:   has a past medical history of Anemia due to unknown mechanism (11/10/2015), Angina pectoris (2014), Arthritis, Back pain, Coronary artery disease, Diabetes mellitus with stage 3 chronic kidney disease (11/18/2020), DM (diabetes mellitus) (25-30 years), DM (diabetes mellitus), Encounter for blood transfusion, Gout (12/05/2017), History of blood transfusion, Hyperlipemia, Hypertension, CHARLES (obstructive sleep apnea), Polyneuropathy, Spinal cord disease, 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    Surgical History:   has a past surgical history that includes Rotator cuff repair (Left, 2006); Shoulder arthroscopy w/ rotator cuff repair (Right, 2009); Laminectomy (07/22/2016); Hernia repair (Bilateral, 04/18/2017); Joint replacement (Left, 10/09/2017); Back surgery (2019); lumbar foraminotomy (03/2018); left elbow (10/2017); Revision total hip arthroplasty (Left, 9/11/2018); Esophagogastroduodenoscopy (N/A, 6/30/2020); Colonoscopy (N/A, 6/30/2020); Injection of joint (Right, 4/14/2023); and Transforaminal epidural injection of steroid (Right, 10/11/2024).    Family History:  family history includes Cancer (age of onset: 50) in his brother; Diabetes in his father, mother, and sister; Drug abuse in his brother; Heart disease in his father and mother; Hypertension in his father and mother; Stroke in his father.    Social History:   reports that he has never smoked. He has never been exposed to tobacco smoke. He has never used smokeless tobacco. He reports that he does not currently use alcohol. He reports that he does not use drugs.    Physical  "Exam:  Vitals:    11/15/24 1457   BP: 116/74   Pulse: 73   Weight: 100.2 kg (220 lb 14.4 oz)   Height: 6' 3" (1.905 m)   PainSc:   2   PainLoc: Back       GENERAL: Well appearing, in no acute distress, alert and oriented x3.  PSYCH:  Mood and affect appropriate.  SKIN: Skin color, texture, turgor normal, no rashes or lesions.  HEAD/FACE:  Normocephalic, atraumatic. Cranial nerves grossly intact.  NECK: Normal ROM. Supple.   CV: RRR with palpation of the radial artery.  PULM: No evidence of respiratory difficulty, symmetric chest rise.  GI:  Soft and non-distended.  MSK: Seated straight leg raising is negative to radicular pain. + pain to palpation over the facet joints of the lumbar spine. + pain with lumbar facet loading. No pain over the SI joints.  GELY test is positive for hip pain on the RIGHT.  Pain with ROM of the right hip. No pain over the GTB bilaterally.  Decreased range of motion of the lumbar spine.  Peripheral joint ROM is full and pain free without obvious instability or laxity in all four extremities. No obvious deformities, edema, or skin discoloration.  No atrophy or tone abnormalities are noted.   NEURO: Bilateral upper and lower extremity coordination and strength is symmetric.  No loss of sensation is noted. No clonus. MENTAL STATUS: A x O x 3, good concentration, speech is fluent and goal directed  MOTOR: 5/5 in all muscle groups  GAIT: Antalgic. Ambulates with a cane.     Imaging:  EXAM: XR HIPS BILATERAL 2 VIEW INCL AP PELVIS     CLINICAL HISTORY: Hip Pain; [M25.551]-Pain in right hip.     TECHNIQUE: Bilateral hip x-ray and AP pelvis 5 views     FINDINGS:   No fracture identified.  Intact well-positioned left total hip arthroplasty with prominent nearly bridging heterotopic ossification laterally.  No acute complication.  Joint alignment is anatomic.  Moderate to severe right hip osteoarthritis with central joint collapse, marginal osteophytes and mild subchondral cystic change.      "   Impression:    No acute abnormality identified.  Chronic findings, as above.     Finalized on: 9/25/2024 4:11 PM By:  Chauncey Tolentino MD        XR KNEE ORTHO RIGHT WITH FLEXION     CLINICAL HISTORY:  Pain in right knee     TECHNIQUE:  AP standing as well as PA flexion standing and Merchant views of both knees were performed.  A lateral view of the right knee is also performed.     COMPARISON:  Non 01/02/2024 e.     FINDINGS:  No significant joint space narrowing.  Mild DJD.  No fracture or dislocation.  No bone destruction identified     Electronically signed by:Abel Nieves MD  Date:                                            11/13/2024      MRI LUMBAR SPINE W WO CONTRAST     CLINICAL HISTORY:  Lumbar radiculopathy, symptoms persist with conservative treatment; Radiculopathy, lumbar region     TECHNIQUE:  Multiplanar, multisequence MR images were acquired from the thoracolumbar junction to the sacrum with and without the administration of contrast.  10 cc of Gadavist was administered without immediate complication.     COMPARISON:  Plain films from 11/14/2022     FINDINGS:  There is 1-2 mm of anterolisthesis L3 on L4 within intradiscal spacer present at the L3-4 level.  Pedicle screws fixation rods also present bilaterally at the L3-4 level.  The vertebral body heights are well maintained, with no fracture.  No marrow signal abnormality suspicious for an infiltrative process.  No abnormal enhancement seen on the postcontrast images.     The conus medullaris terminates at approximately the superior endplate of L2.  The adjacent soft tissue structures show no significant abnormalities.  There is disc desiccation noted throughout the lumbar spine with moderate disc space narrowing seen at L5-S1 and more mild disc space narrowing seen at the remaining levels.     L1-L2: No significant central canal or neural foraminal narrowing.     L2-L3: Minimal diffuse disc bulge along with bilateral ligamentum flavum hypertrophy  resulting in relatively mild narrowing of the central canal.  No neural foraminal canal narrowing.     L3-L4: No significant central canal or neural foraminal narrowing.Postoperative changes seen at this level.     L4-L5:  Diffuse disc bulge slightly more prominent in the right paracentral region along with moderate bilateral facet arthropathy resulting in relatively mild narrowing of the central canal and mild narrowing of either neural foraminal canal right greater than the left.     L5-S1:  Mild-to-moderate bilateral facet arthropathy along with disc space narrowing seen at this level resulting in no significant central canal narrowing.  The bilateral neural foraminal canals appear to be mildly narrowed.     Impression:     1. Postoperative and mild degenerative changes of the lumbar spine as detailed above.  No high-grade central canal or neural foraminal canal narrowing suggested.        Electronically signed by:Gadiel Evans DO  Date:                                            09/03/2024    Labs:  BMP  Lab Results   Component Value Date     (H) 12/27/2023    K 4.3 12/27/2023     12/27/2023    CO2 24 12/27/2023    BUN 13 12/27/2023    CREATININE 1.7 (H) 09/03/2024    CALCIUM 8.6 (L) 12/27/2023    ANIONGAP 13 12/27/2023    EGFRNORACEVR 40 (A) 09/03/2024     Lab Results   Component Value Date    ALT 17 10/12/2023    AST 21 10/12/2023    ALKPHOS 63 10/12/2023    BILITOT 0.5 10/12/2023     Lab Results   Component Value Date    WBC 11.09 12/18/2023    HGB 11.3 (L) 12/18/2023    HCT 34.5 (L) 12/18/2023    MCV 89 12/18/2023     12/18/2023           Assessment:  Problem List Items Addressed This Visit          Neuro    Lumbar spondylosis       Orthopedic    Primary osteoarthritis of left knee    Primary osteoarthritis of right hip    Relevant Orders    Procedure Order to Pain Management    Primary osteoarthritis of right knee     Other Visit Diagnoses       Hip pain, unspecified laterality    -   Primary    Relevant Orders    Procedure Order to Pain Management            11/15/2024 - Srinath Mcknight is a 83 y.o. 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  By history and examination this patient has chronic Right hip pain with radiation to the right knee. The underlying cause is osteoarthritis/degenerative joint disease..  Pathology is confirmed by imaging.  We discussed the underlying diagnoses and multiple treatment options including non-opioid medications, interventional procedures, physical therapy, and home exercise.  Plan for right intra-articular hip injection.  Consider right femoral obturator block/RFA in the future.  Can also revisit low back pain after addressing the hip.  May benefit from lumbar MBB/RFA. he risks and benefits of each treatment option were discussed and all questions were answered.      Treatment Plan:   Procedures:  Plan for right intra-articular hip injection.  Consider right femoral obturator block/RFA in the future.  Can also revisit low back pain after addressing the hip.  May benefit from lumbar MBB/RFA.   PT/OT/HEP: I have stressed the importance of physical activity and a home exercise plan to help with pain and improve health.  Completed physical therapy in the past.  He is compliant with a home exercise regimen.  I have provided him with the  [facility] hip conditioning program.  Medications:    - continue with Lyrica.    -  Reviewed and consistent with medication use as prescribed.  Imaging:  Available imaging reviewed and discussed with the patient.  Follow Up: RTC 2 weeks postprocedure    Merissa Kelly DO   Interventional Pain Medicine / Anesthesiology    Disclaimer: This note was partly generated using dictation software which may occasionally result in transcription errors.

## 2024-11-15 NOTE — TELEPHONE ENCOUNTER
----- Message from Contreras Kelly sent at 11/15/2024  3:32 PM CST -----  Regarding: Order for HENRIK SCHNEIDER    Patient Name: HENRIK SCHNEIDER([id number])  Sex: Male  : 1940      PCP: CHANDU LAWRENCE    Center: [facility] CENTRAL BILLING OFFICE     Types of orders made on 11/15/2024: Medications, Outpatient Referral, Procedure                                       Request    Order Date:11/15/2024  Ordering User:CONTRERAS KELLY [[id number]]  Encounter Provider:Contreras Kelly DO [[id number]]  Authorizing Provider: Contreras Kelly DO [[id number]]  Department:[facility] [place] [facility][[id number]]    Common Order Information  Procedure -> Joint/Bursa Injection (Specify joint and laterality) Cmt: Right             intra-articular hip injection    Pre-op Diagnosis -> Hip pain, chronic       -> OA (osteoarthritis) of hip     Order Specific Information  Order: Procedure Order to Pain Management [Custom: [id number]]  Order #:          [id number]: 1 FUTURE    Priority: Routine  Class: Clinic Performed    Future Order Information      Expires on:11/15/2025            Expected by:11/15/2024                   Comment:Prefers Monday    Associated Diagnoses      M16.11 Primary osteoarthritis of right hip      M25.559 Hip pain, unspecified laterality      Physician -> Gelter         Is patient on anti-coagulants? -> Yes Cmt: ASA 81 mg        Facility Name: -> [facility]           Priority: Routine  Class: Clinic Performed    Future Order Information      Expires on:11/15/2025            Expected by:11/15/2024                   Comment:Prefers Monday    Associated Diagnoses      M16.11 Primary osteoarthritis of right hip      M25.559 Hip pain, unspecified laterality      Procedure -> Joint/Bursa Injection (Specify joint and laterality) Cmt:                 Right intra-articular hip injection        Physician -> Gelter         Is patient on anti-coagulants? -> Yes Cmt: ASA 81 mg        Pre-op Diagnosis -> Hip pain, chronic           -> OA  (osteoarthritis) of hip         Facility Name: -> [facility]

## 2024-11-19 ENCOUNTER — OFFICE VISIT (OUTPATIENT)
Dept: PULMONOLOGY | Facility: CLINIC | Age: 84
End: 2024-11-19
Payer: MEDICARE

## 2024-11-19 VITALS
HEIGHT: 75 IN | OXYGEN SATURATION: 99 % | RESPIRATION RATE: 20 BRPM | DIASTOLIC BLOOD PRESSURE: 80 MMHG | HEART RATE: 79 BPM | WEIGHT: 220.38 LBS | BODY MASS INDEX: 27.4 KG/M2 | SYSTOLIC BLOOD PRESSURE: 121 MMHG

## 2024-11-19 DIAGNOSIS — I15.2 HYPERTENSION ASSOCIATED WITH DIABETES: ICD-10-CM

## 2024-11-19 DIAGNOSIS — G47.52 CONTROLLED REM SLEEP BEHAVIOR DISORDER: ICD-10-CM

## 2024-11-19 DIAGNOSIS — E11.69 COMBINED HYPERLIPIDEMIA ASSOCIATED WITH TYPE 2 DIABETES MELLITUS: Primary | Chronic | ICD-10-CM

## 2024-11-19 DIAGNOSIS — E78.2 COMBINED HYPERLIPIDEMIA ASSOCIATED WITH TYPE 2 DIABETES MELLITUS: Primary | Chronic | ICD-10-CM

## 2024-11-19 DIAGNOSIS — E11.51 TYPE 2 DIABETES MELLITUS WITH DIABETIC PERIPHERAL ANGIOPATHY WITHOUT GANGRENE, WITHOUT LONG-TERM CURRENT USE OF INSULIN: ICD-10-CM

## 2024-11-19 DIAGNOSIS — E11.59 HYPERTENSION ASSOCIATED WITH DIABETES: ICD-10-CM

## 2024-11-19 DIAGNOSIS — G47.61 PLMD (PERIODIC LIMB MOVEMENT DISORDER): Chronic | ICD-10-CM

## 2024-11-19 PROCEDURE — 1101F PT FALLS ASSESS-DOCD LE1/YR: CPT | Mod: HCNC,CPTII,S$GLB, | Performed by: INTERNAL MEDICINE

## 2024-11-19 PROCEDURE — 1159F MED LIST DOCD IN RCRD: CPT | Mod: HCNC,CPTII,S$GLB, | Performed by: INTERNAL MEDICINE

## 2024-11-19 PROCEDURE — 99214 OFFICE O/P EST MOD 30 MIN: CPT | Mod: HCNC,S$GLB,, | Performed by: INTERNAL MEDICINE

## 2024-11-19 PROCEDURE — 3288F FALL RISK ASSESSMENT DOCD: CPT | Mod: HCNC,CPTII,S$GLB, | Performed by: INTERNAL MEDICINE

## 2024-11-19 PROCEDURE — 3074F SYST BP LT 130 MM HG: CPT | Mod: HCNC,CPTII,S$GLB, | Performed by: INTERNAL MEDICINE

## 2024-11-19 PROCEDURE — 1160F RVW MEDS BY RX/DR IN RCRD: CPT | Mod: HCNC,CPTII,S$GLB, | Performed by: INTERNAL MEDICINE

## 2024-11-19 PROCEDURE — 99999 PR PBB SHADOW E&M-EST. PATIENT-LVL V: CPT | Mod: PBBFAC,HCNC,, | Performed by: INTERNAL MEDICINE

## 2024-11-19 PROCEDURE — 1125F AMNT PAIN NOTED PAIN PRSNT: CPT | Mod: HCNC,CPTII,S$GLB, | Performed by: INTERNAL MEDICINE

## 2024-11-19 PROCEDURE — 3079F DIAST BP 80-89 MM HG: CPT | Mod: HCNC,CPTII,S$GLB, | Performed by: INTERNAL MEDICINE

## 2024-11-19 RX ORDER — PRAMIPEXOLE DIHYDROCHLORIDE 0.12 MG/1
0.12 TABLET ORAL NIGHTLY
Qty: 90 TABLET | Refills: 2 | Status: SHIPPED | OUTPATIENT
Start: 2024-11-19 | End: 2025-08-16

## 2024-11-19 RX ORDER — ACETAMINOPHEN, DIPHENHYDRAMINE HCL, PHENYLEPHRINE HCL 325; 25; 5 MG/1; MG/1; MG/1
15 TABLET ORAL NIGHTLY
Qty: 45 TABLET | Refills: 11 | Status: SHIPPED | OUTPATIENT
Start: 2024-11-19 | End: 2025-11-19

## 2024-11-19 NOTE — PROGRESS NOTES
Subjective:       Srinath Mcknight is a 83 y.o. male   Last visit was 07/26/2018  Has been doing overall well.  [place] score 5. Recent revision hip Left  His major sleep issues a REM behavior disorder, PLMD and obstructive sleep apnea  With regard to obstructive sleep apnea and this is borderline patient has been reluctant to use CPAP in past.  REM behavioral events have been very infrequent less than once or twice in the month  He is stable on a regimen of clonidine 0.5 mg.  And melatonin   He is not taking Mirapex for his periodic limb movement  No cough no wheezing or shortness of breath  I have reviewed the patient's medical history in detail and updated the computerized patient record.    01/05/2022  Follow up visit to review tersts  C/o easily fatigue, DAMON   See cardiology , -ve w/u  Seen Dr Zimmer  ABG: Compensated resp alkalosis  6MWD:Reduced exercise capacity, 32.42%, Dyspnea wa grade 3, SpO2 100%, Peak BP and HR was 103 BPM, /64  PFT: Normal Spirometry:Mild restriction: DLCO mild reduced  Bed time:1130pm  Wake time 9 am  Appear to have stopped Klonopin and Melatonin while in hospital, says wife observed sleep reenactment activity    06/15/2022:  Last visit was 01/05/2022  Interested in Pul rehab: did not qualify last testing  No cough, No wheezing  SOB: frustrating after hip replacement and back surgery  PFT: low MVV and restriction  Not on Oxygen  Has GAYLA inhaler, not sure  regardinfg sleep issues  Stable on Klonopin and Melatonin  No reenactment    07/29/2022  Reviewed PFT and Walk  Improved from 32.4% to 44.21%  SNIFF was normal  [place] 4  Accepted to pul rehab  PFT: restriction with reduced DLCO    02/08/2023  Last visit 07/29/2022  Snoring when sleep on backEpworth 7  Bed time 11 pm, wake time 11am  Taking Melatonin  No sleep related movements  Klonopin + Melatonin  No SOB,   [place] 7  Has not walked out of bed recently  PSG 2017 reveiwed  Reluctant to wear CPAP in past   BMI increased  from 27.9 to 28  Increased snoring     03/30/2023  Last visit  Here to review PSG  AHI was 2.4/hr  Sleep talking was observed x 1  No RBD  .0/hr with 50 associated with arousal  Added Mirapex  No CHARLES   Snoring  with SpO 2 90%     07/06/2023  Followup  [place] score 7  Bed time: 11: 30 pm  Wake time 10:30 am  RBD events in early Am 5-6 am, wake out of bed  Occurs 2-3 nights  Adherent with Mirapex, increased dose melatoni  Snoring at night sleeping on back and left side  Phamacy alerted him about risk of benzo in elderly  At this point in time caanot safely DC klonopin  Will increase melatonin to target max dose 20 mg  Discussed with patient medical neccessity and advised spouse to use spare bedroom( reluctant)  No ETOH    03/20/2024  Followup  Here alone, drove to appt  Occasionas of dream enactment fewer   However will murmur in bed  Klonopin 1.0 mg and melation 10 mg  Will increase to Klonopin 1.5 mg and melatoni 15 mg  Has chronic issues wityh balance, using cane  Has not fallen  Wants to see neurology  Mirapex: adherence poor: refill sent      06/24/2024   Follow-up visit   No nocturnal REM associated events  Daytime: going to gym, independent ADL: trim hedges vacuum  Going to PT , bad knee and leg swelling  Has BA stocking  No DTS  [place] 5  Regime Melatonin, Klonopin, Mirapex  Bed 12 MN  Wake time 10 am    11/19/2024   Follow-up visit   No nocturnal REM associated events  Stopped Klonopin due to drowsyness  No DTS  [place] 5  Regime Melatonin,  Mirapex       Previous Report(s) Reviewed: historical medical records and office notes     The following portions of the patient's history were reviewed and updated as appropriate:   He  has a past medical history of Anemia due to unknown mechanism (11/10/2015), Angina pectoris (2014), Arthritis, Back pain, Coronary artery disease, Diabetes mellitus with stage 3 chronic kidney disease (11/18/2020), DM (diabetes mellitus) (25-30 years), DM (diabetes mellitus),  Encounter for blood transfusion, Gout (12/05/2017), History of blood transfusion, Hyperlipemia, Hypertension, CHARLES (obstructive sleep apnea), Polyneuropathy, Spinal cord disease, 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  He does not have any pertinent problems on file.  He  has a past surgical history that includes Rotator cuff repair (Left, 2006); Shoulder arthroscopy w/ rotator cuff repair (Right, 2009); Laminectomy (07/22/2016); Hernia repair (Bilateral, 04/18/2017); Joint replacement (Left, 10/09/2017); Back surgery (2019); lumbar foraminotomy (03/2018); left elbow (10/2017); Revision total hip arthroplasty (Left, 9/11/2018); Esophagogastroduodenoscopy (N/A, 6/30/2020); Colonoscopy (N/A, 6/30/2020); Injection of joint (Right, 4/14/2023); and Transforaminal epidural injection of steroid (Right, 10/11/2024).  His family history includes Cancer (age of onset: 50) in his brother; Diabetes in his father, mother, and sister; Drug abuse in his brother; Heart disease in his father and mother; Hypertension in his father and mother; Stroke in his father.  He  reports that he has never smoked. He has never been exposed to tobacco smoke. He has never used smokeless tobacco. He reports that he does not currently use alcohol. He reports that he does not use drugs.  He has a current medication list which includes the following prescription(s): alfuzosin, allopurinol, blood glucose control, low, true metrix air glucose meter, clonazepam, clotrimazole, cyanocobalamin (vitamin b-12), diclofenac sodium, fluticasone propionate, garlic extract, lactulose, levocetirizine, losartan, metformin, metoprolol succinate, mometasone 0.1%, multivitamin, pravastatin, pregabalin, sildenafil, solifenacin, true metrix glucose test strip, trueplus lancets, melatonin, and  pramipexole.  Current Outpatient Medications on File Prior to Visit   Medication Sig Dispense Refill    alfuzosin (UROXATRAL) 10 mg Tb24 Take 1 tablet (10 mg total) by mouth daily with breakfast. 30 tablet 11    allopurinoL (ZYLOPRIM) 100 MG tablet TAKE 1 TABLET EVERY DAY 90 tablet 0    blood glucose control, low Soln by Misc.(Non-Drug; Combo Route) route.      blood-glucose meter (TRUE METRIX AIR GLUCOSE METER) kit USE AS DIRECTED 1 each 0    clonazePAM (KLONOPIN) 0.5 MG tablet Take 0.5 mg by mouth 2 (two) times daily as needed for Anxiety.      clotrimazole (LOTRIMIN) 1 % cream Apply topically 2 (two) times daily. for 10 days 60 g 0    cyanocobalamin, vitamin B-12, 1,000 mcg Subl Place 1,000 mcg under the tongue once daily. 90 tablet 3    diclofenac sodium (VOLTAREN) 1 % Gel Apply 2 g topically 4 (four) times daily as needed (pain). 200 g 2    fluticasone propionate (FLONASE) 50 mcg/actuation nasal spray 1 spray (50 mcg total) by Each Nostril route once daily. 16 mL 0    GARLIC EXTRACT ORAL Take 1 tablet by mouth once daily. Per Negin LIAO      lactulose (CHRONULAC) 10 gram/15 mL solution SMARTSI Milliliter(s) By Mouth Daily      levocetirizine (XYZAL) 5 MG tablet Take 1 tablet (5 mg total) by mouth every evening. 30 tablet 0    losartan (COZAAR) 50 MG tablet Take 1 tablet (50 mg total) by mouth once daily. 90 tablet 3    metFORMIN (GLUCOPHAGE) 1000 MG tablet TAKE 1 TABLET TWICE DAILY WITH MEALS 180 tablet 1    metoprolol succinate (TOPROL-XL) 50 MG 24 hr tablet TAKE 1 TABLET EVERY DAY 90 tablet 3    mometasone 0.1% (ELOCON) 0.1 % cream Apply topically once daily. 50 g 2    multivitamin (THERAGRAN) per tablet Take 1 tablet by mouth once daily.      pravastatin (PRAVACHOL) 40 MG tablet TAKE 1 TABLET EVERY DAY 90 tablet 0    pregabalin (LYRICA) 150 MG capsule Take 1 capsule (150 mg total) by mouth every evening for 5 days, THEN 1 capsule (150 mg total) 2 (two) times daily. 120 capsule 0    sildenafiL  "(VIAGRA) 100 MG tablet Take 1 tablet (100 mg total) by mouth daily as needed for Erectile Dysfunction. 15 tablet 5    solifenacin (VESICARE) 10 MG tablet Take 1 tablet (10 mg total) by mouth once daily. 30 tablet 11    TRUE METRIX GLUCOSE TEST STRIP Strp USE AS DIRECTED TO CHECK SUGARS 300 strip 3    TRUEPLUS LANCETS 33 gauge Misc USE AS DIRECTED TO CHECK SUGARS 300 each 3    [DISCONTINUED] melatonin 10 mg Tab Take 1.5 tablets (15 mg total) by mouth every evening. 45 tablet 11    [DISCONTINUED] pramipexole (MIRAPEX) 0.125 MG tablet Take 1 tablet (0.125 mg total) by mouth every evening. 90 tablet 2     No current facility-administered medications on file prior to visit.     He is allergic to sulfa (sulfonamide antibiotics)..     Review of Systems   Constitutional:  Positive for malaise/fatigue.   Eyes: Negative.    Respiratory:          Snoring   Cardiovascular: Negative.    Genitourinary: Negative.    Musculoskeletal: Negative.    Neurological: Negative.    Endo/Heme/Allergies: Negative.    Psychiatric/Behavioral:  Positive for memory loss.    All other systems reviewed and are negative.        Objective:      Vitals:    11/19/24 0926   BP: 121/80   Patient Position: Sitting   Pulse: 79   Resp: 20   SpO2: 99%   Weight: 100 kg (220 lb 5.6 oz)   Height: 6' 3" (1.905 m)                 Using cane    Physical Exam  Vitals and nursing note reviewed.   Constitutional:       Appearance: He is well-developed.   HENT:      Head: Normocephalic and atraumatic.      Nose: Nose normal.   Eyes:      General:         Left eye: No discharge.      Pupils: Pupils are equal, round, and reactive to light.   Neck:      Vascular: No JVD.   Cardiovascular:      Rate and Rhythm: Normal rate and regular rhythm.      Heart sounds: Normal heart sounds. No murmur heard.  Pulmonary:      Effort: Pulmonary effort is normal. No respiratory distress.   Abdominal:      General: Bowel sounds are normal.      Palpations: Abdomen is soft. "   Musculoskeletal:         General: No deformity. Normal range of motion.      Cervical back: Normal range of motion and neck supple.   Skin:     General: Skin is warm and dry.   Neurological:      Mental Status: He is alert and oriented to person, place, and time.      Deep Tendon Reflexes: Reflexes are normal and symmetric.            Assessment:      Problem List Items Addressed This Visit       PLMD (periodic limb movement disorder) (Chronic)    Relevant Medications    pramipexole (MIRAPEX) 0.125 MG tablet    Combined hyperlipidemia associated with type 2 diabetes mellitus - Primary (Chronic)    Type 2 diabetes mellitus with diabetic peripheral angiopathy without gangrene, without long-term current use of insulin    Hypertension associated with diabetes    Controlled REM sleep behavior disorder     Bed time 10-11:30 pm  Wake time 10 am  Regime  Melatonin,   Mirapex    No nocturnal  events      Stopped Klonopin made him drowsy    Requested Prescriptions     Signed Prescriptions Disp Refills    melatonin 10 mg Tab 45 tablet 11     Sig: Take 1.5 tablets (15 mg total) by mouth every evening.             Relevant Medications    melatonin 10 mg Tab      Plan:      Overall stable.   Continue current regime: Melatonin  +Mirapex    Closely monitor    Requested Prescriptions     Signed Prescriptions Disp Refills    melatonin 10 mg Tab 45 tablet 11     Sig: Take 1.5 tablets (15 mg total) by mouth every evening.    pramipexole (MIRAPEX) 0.125 MG tablet 90 tablet 2     Sig: Take 1 tablet (0.125 mg total) by mouth every evening.         Requested Prescriptions     Signed Prescriptions Disp Refills    melatonin 10 mg Tab 45 tablet 11     Sig: Take 1.5 tablets (15 mg total) by mouth every evening.    pramipexole (MIRAPEX) 0.125 MG tablet 90 tablet 2     Sig: Take 1 tablet (0.125 mg total) by mouth every evening.         Follow up in about 6 months (around 5/19/2025).    This note was prepared using voice recognition system and  is likely to have sound alike errors that may have been overlooked even after proof reading.  Please call me with any questions    Discussed diagnosis, its evaluation, treatment and usual course. All questions answered.    Thank you for the courtesy of participating in the care of this patient    Martin Machuca MD

## 2024-11-19 NOTE — ASSESSMENT & PLAN NOTE
Bed time 10-11:30 pm  Wake time 10 am  Regime  Melatonin,   Mirapex    No nocturnal  events      Stopped Klonopin made him drowsy    Requested Prescriptions     Signed Prescriptions Disp Refills    melatonin 10 mg Tab 45 tablet 11     Sig: Take 1.5 tablets (15 mg total) by mouth every evening.

## 2024-12-01 DIAGNOSIS — E11.59 HYPERTENSION ASSOCIATED WITH DIABETES: ICD-10-CM

## 2024-12-01 DIAGNOSIS — I15.2 HYPERTENSION ASSOCIATED WITH DIABETES: ICD-10-CM

## 2024-12-01 NOTE — TELEPHONE ENCOUNTER
No care due was identified.  [facility] Embedded Care Due Messages. Reference number: [id number].   12/01/2024 12:16:24 PM CST

## 2024-12-02 RX ORDER — LOSARTAN POTASSIUM 50 MG/1
50 TABLET ORAL
Qty: 90 TABLET | Refills: 0 | Status: SHIPPED | OUTPATIENT
Start: 2024-12-02

## 2024-12-02 RX ORDER — METFORMIN HYDROCHLORIDE 1000 MG/1
TABLET ORAL
Qty: 180 TABLET | Refills: 0 | Status: SHIPPED | OUTPATIENT
Start: 2024-12-02

## 2024-12-02 NOTE — TELEPHONE ENCOUNTER
Refill Routing Note   Medication(s) are not appropriate for processing by [facility] for the following reason(s):        Required labs outdated    [facility] action(s):  Defer  Approve        Medication Therapy Plan: eGFR/CrCl data reviewed. Current dosage is within recommendations for patient's renal function.      Appointments  past 12m or future 3m with PCP    Date Provider   Last Visit   12/18/2023 Yeison Wilder MD   Next Visit   Visit date not found Yeison Wilder MD   ED visits in past 90 days: 0        Note composed:6:30 AM 12/02/2024

## 2024-12-09 ENCOUNTER — TELEPHONE (OUTPATIENT)
Dept: PAIN MEDICINE | Facility: CLINIC | Age: 84
End: 2024-12-09
Payer: MEDICARE

## 2024-12-09 NOTE — TELEPHONE ENCOUNTER
----- Message from Dane Truong sent at 12/9/2024  2:26 PM CST -----  Contact: pt    ----- Message -----  From: Barb Huston  Sent: 12/9/2024   2:17 PM CST  To: Leticia Craven Staff    Type:  Needs Procedure Location for Monday     Who Called: pt   Would the patient rather a call back or a response via [facility]? Call  Best Call Back Number: [phone]  Additional Information:   Pt requesting a call

## 2024-12-11 ENCOUNTER — OFFICE VISIT (OUTPATIENT)
Dept: OTOLARYNGOLOGY | Facility: CLINIC | Age: 84
End: 2024-12-11
Payer: MEDICARE

## 2024-12-11 VITALS — BODY MASS INDEX: 27.69 KG/M2 | WEIGHT: 221.56 LBS

## 2024-12-11 DIAGNOSIS — H90.5 PERCEPTIVE HEARING LOSS OR DEAFNESS: ICD-10-CM

## 2024-12-11 DIAGNOSIS — H61.21 IMPACTED CERUMEN OF RIGHT EAR: Primary | ICD-10-CM

## 2024-12-11 PROCEDURE — 69210 REMOVE IMPACTED EAR WAX UNI: CPT | Mod: HCNC,S$GLB,, | Performed by: PHYSICIAN ASSISTANT

## 2024-12-11 PROCEDURE — 1100F PTFALLS ASSESS-DOCD GE2>/YR: CPT | Mod: HCNC,CPTII,S$GLB, | Performed by: PHYSICIAN ASSISTANT

## 2024-12-11 PROCEDURE — 99213 OFFICE O/P EST LOW 20 MIN: CPT | Mod: 25,HCNC,S$GLB, | Performed by: PHYSICIAN ASSISTANT

## 2024-12-11 PROCEDURE — 3288F FALL RISK ASSESSMENT DOCD: CPT | Mod: HCNC,CPTII,S$GLB, | Performed by: PHYSICIAN ASSISTANT

## 2024-12-11 PROCEDURE — 1159F MED LIST DOCD IN RCRD: CPT | Mod: HCNC,CPTII,S$GLB, | Performed by: PHYSICIAN ASSISTANT

## 2024-12-11 PROCEDURE — 99999 PR PBB SHADOW E&M-EST. PATIENT-LVL III: CPT | Mod: PBBFAC,HCNC,, | Performed by: PHYSICIAN ASSISTANT

## 2024-12-11 NOTE — PROGRESS NOTES
Subjective:   Patient ID: Srinath Mcknight is a 84 y.o. male.    Chief Complaint: Cerumen Impaction (Pt is coming in today for ear wax pt also states that he is hard of hearing and his balance is off and no doctor knows why is like that )    Patient is here to see me today for evaluation of a possible wax impaction in bilateral ears.  He has complaints of hearing loss in the affected ears, but denies pain or drainage.  This has been an issue in the past.  The patient has not been using any sort of ear drop to soften the wax. He also feels like his hearing has worsened over past year. He had HA but they did not work properly.       Review of patient's allergies indicates:   Allergen Reactions    Sulfa (sulfonamide antibiotics)      Can't recall from 1995           Review of Systems   Constitutional:  Negative for fatigue, fever and unexpected weight change.   HENT:  Positive for hearing loss. Negative for congestion, ear discharge, ear pain, facial swelling, nosebleeds, postnasal drip, rhinorrhea, sinus pressure, sneezing, sore throat, tinnitus, trouble swallowing and voice change.    Eyes:  Negative for discharge, redness and itching.   Respiratory:  Negative for cough, choking, shortness of breath and wheezing.    Cardiovascular:  Negative for chest pain and palpitations.   Gastrointestinal:  Negative for abdominal pain.        No reflux.   Musculoskeletal:  Negative for neck pain.   Neurological:  Negative for dizziness, facial asymmetry, light-headedness and headaches.   Hematological:  Negative for adenopathy. Does not bruise/bleed easily.   Psychiatric/Behavioral:  Negative for agitation, behavioral problems, confusion and decreased concentration.          Objective:   Wt 100.5 kg (221 lb 9 oz)   BMI 27.69 kg/m²     Physical Exam  Constitutional:       General: He is not in acute distress.     Appearance: He is well-developed.   HENT:      Head: Normocephalic and atraumatic.      Jaw: No trismus.      Right  Ear: Tympanic membrane, ear canal and external ear normal. Decreased hearing noted. There is impacted cerumen.      Left Ear: Tympanic membrane, ear canal and external ear normal. Decreased hearing noted.      Nose: Nose normal. No nasal deformity, septal deviation, mucosal edema or rhinorrhea.      Mouth/Throat:      Dentition: Normal dentition.      Pharynx: Uvula midline. No oropharyngeal exudate or uvula swelling.   Eyes:      General: No scleral icterus.     Conjunctiva/sclera: Conjunctivae normal.      Right eye: Right conjunctiva is not injected. No chemosis.     Left eye: Left conjunctiva is not injected. No chemosis.     Pupils: Pupils are equal, round, and reactive to light.   Neck:      Thyroid: No thyroid mass or thyromegaly.      Trachea: Trachea and phonation normal. No tracheal tenderness or tracheal deviation.   Pulmonary:      Effort: Pulmonary effort is normal. No accessory muscle usage or respiratory distress.      Breath sounds: No stridor.   Lymphadenopathy:      Head:      Right side of head: No submental, submandibular, preauricular or posterior auricular adenopathy.      Left side of head: No submental, submandibular, preauricular or posterior auricular adenopathy.      Cervical: No cervical adenopathy.      Right cervical: No superficial or deep cervical adenopathy.     Left cervical: No superficial or deep cervical adenopathy.   Skin:     General: Skin is warm and dry.      Findings: No erythema or rash.   Neurological:      Mental Status: He is alert and oriented to person, place, and time.      Cranial Nerves: No cranial nerve deficit.   Psychiatric:         Behavior: Behavior normal.         Thought Content: Thought content normal.          Procedure Note    CHIEF COMPLAINT:  Cerumen Impaction    Description:  The patient was seated in an exam chair.  An ear speculum was placed in the right EAC and was examined under the microscope.  Suction and/or loop curettes were used to remove a  large cerumen impaction.  The tympanic membrane was visualized and was normal in appearance.  The procedure was repeated on the left side in a similar fashion.  The TM was intact and normal on this side as well.  The patient tolerated the procedure well.     Assessment:     1. Impacted cerumen of right ear    2. Perceptive hearing loss or deafness        Plan:     Impacted cerumen of right ear    Perceptive hearing loss or deafness       Cerumen impaction:  Removed today without difficulty.  I would recommend the use of a wax softening drop, either over the counter Debrox or mineral oil, on a weekly basis.  I also instructed the patient to avoid Qtips.     Audiogram schedule to evaluate HL.

## 2024-12-12 NOTE — PRE-PROCEDURE INSTRUCTIONS
Unable to reach pt via phone.  Left voicemail with arrival time also informing pt of need for responsible  accompaniment and instructing pt to follow pre-procedure instructions provided via [facility] portal.  The following message was sent to pt's portal.        Dear [place],     Please read over the following pre-procedure instructions in it's entirety as there is helpful information here to get you well prepared for your upcoming procedure.     You are scheduled for a procedure with Dr. Kelly on 12/16/2024.     [facility] [place] [facility] at the corner of [facility] and [facility]. It is in the [place] next to [employer]. The address is: [address] [place] [address]. Take the elevator to the 2nd floor.       Registration check in time: 1:00 pm  Scheduled procedure time: 2:00 pm     If you are receiving sedation, you CANNOT drive yourself and must have a responsible friend or family member (no rideshare) to drive you home.        You should take any medications that you routinely take for blood pressure, heart medications, thyroid, cholesterol, etc.      The fasting restrictions are dependent on whether or not you are receiving sedation. Sedation is not available for all procedures.      Your fasting instructions/Sedation type are as follow:  Oral Sedation. You do not need to fast before this procedure.  You can eat and drink like normal.  You CANNOT drive yourself and must have a .           If you are on blood thinners, you need to follow the anticoagulation instructions that had been discussed previously. You should only stop the blood thinners if it was approved by your primary care physician or your cardiologist. In the event that you are not able to stop your blood thinners, a blood thinner was not listed on your medication list, or we were not able to get clearance from your cardiologist, then the procedure may have to be postponed/canceled.      IF you were told to stop your  blood thinners, this is how long you should generally hold some of the more common ones. Remember that stopping blood thinners is only necessary for certain procedures. If you are unsure of your instructions, please call us.   Aspirin - 5 days  Plavix/Clopidogrel - 7 days  Warfarin / Coumadin - 5 days  Eliquis - 3 days  Pradaxa/Dabigatran - 4 days  Xarelto/Rivaroxaban - 3 days        HOLD all non-insulin injections (shots) until after surgery (Ozempic, Mounjaro, Trulicity, Victoza, Byetta, Wegovy and Adlyxin) (Total of 7 days prior)        If you are a diabetic, do not take your medication if you will be fasting, but bring it with you. Please plan on being here for roughly 2-3 hours.     Please call us if you have been sick (running fever, having any flu-like symptoms) or have been taking ANTIBIOTICS in the past 2 weeks or had any outpatient procedures other than with us (colonoscopy, endoscopy, OBGYN, dental, etc.).      If you have been previously COVID positive, you will need to hold off on your procedure until you are symptom free for 10 days. If you did not have any symptoms, you can have your procedure 10 days from your positive test result.         On the morning of your procedure:  *HOLD ALL VITAMINS, MINERALS, HERBS (INCLUDING HERBAL TEAS) AND SUPPLEMENTS  *SHOWER WITH ANTIBACTERIAL SOAP (EX. DIAL) NIGHT BEFORE AND MORNING OF PROCEDURE  *DO NOT APPLY ANY LOTIONS, OILS, POWDERS, PERFUME/COLOGNE, OINTMENTS, GELS, CREAMS, MAKEUP OR DEODORANT TO YOUR SKIN MORNING OF PROCEDURE  *LEAVE JEWELRY AND ANY VALUABLES AT HOME  *WEAR LOOSE COMFORTABLE CLOTHING         Please reply to this portal message as receipt of delivery.     Thank you,  [facility]  [place], LPN [facility]  Pre-Admit

## 2024-12-14 NOTE — TELEPHONE ENCOUNTER
Care Due:                  Date            Visit Type   Department     Provider  --------------------------------------------------------------------------------                                EP -                              PRIMARY      [facility] INTERNAL  Last Visit: 12-      CARE ([facility])   MEDICINE       Yeison Wilder  Next Visit: None Scheduled  None         None Found                                                            Last  Test          Frequency    Reason                     Performed    Due Date  --------------------------------------------------------------------------------    Office Visit  15 months..  allopurinoL, losartan,     12- 03-                             metFORMIN, pravastatin...    [employer] Embedded Care Due Messages. Reference number: [id number].   12/14/2024 6:01:18 AM CST

## 2024-12-15 NOTE — TELEPHONE ENCOUNTER
Refill Routing Note   Medication(s) are not appropriate for processing by [facility] for the following reason(s):        Drug-disease interaction: Drug-Disease: metoprolol succinate and Hypertension associated with diabetes; Type 2 diabetes mellitus with diabetic peripheral angiopathy without gangrene, without long-term current use of insulin     [facility] action(s):  Defer   Requires appointment : Yes             Appointments  past 12m or future 3m with PCP    Date Provider   Last Visit   12/18/2023 Yeison Wilder MD   Next Visit   Visit date not found Yeison Wiledr MD   ED visits in past 90 days: 0        Note composed:2:09 PM 12/15/2024

## 2024-12-15 NOTE — PROGRESS NOTES
Subjective:       Patient ID: Srinath Mcknight is a 84 y.o. male.    Chief Complaint: No chief complaint on file.    HPI The patient presented on 2024 for evaluation of Polyneuropathy.  New issues: none.     Review of Systems        Current Outpatient Medications:     alfuzosin (UROXATRAL) 10 mg Tb24, Take 1 tablet (10 mg total) by mouth daily with breakfast., Disp: 30 tablet, Rfl: 11    allopurinoL (ZYLOPRIM) 100 MG tablet, TAKE 1 TABLET EVERY DAY, Disp: 90 tablet, Rfl: 0    blood glucose control, low Soln, by Misc.(Non-Drug; Combo Route) route., Disp: , Rfl:     blood-glucose meter (TRUE METRIX AIR GLUCOSE METER) kit, USE AS DIRECTED, Disp: 1 each, Rfl: 0    clonazePAM (KLONOPIN) 0.5 MG tablet, Take 0.5 mg by mouth 2 (two) times daily as needed for Anxiety., Disp: , Rfl:     clotrimazole (LOTRIMIN) 1 % cream, Apply topically 2 (two) times daily. for 10 days, Disp: 60 g, Rfl: 0    cyanocobalamin, vitamin B-12, 1,000 mcg Subl, Place 1,000 mcg under the tongue once daily., Disp: 90 tablet, Rfl: 3    diclofenac sodium (VOLTAREN) 1 % Gel, Apply 2 g topically 4 (four) times daily as needed (pain)., Disp: 200 g, Rfl: 2    fluticasone propionate (FLONASE) 50 mcg/actuation nasal spray, 1 spray (50 mcg total) by Each Nostril route once daily., Disp: 16 mL, Rfl: 0    GARLIC EXTRACT ORAL, Take 1 tablet by mouth once daily. Per [facility], Disp: , Rfl:     lactulose (CHRONULAC) 10 gram/15 mL solution, SMARTSI Milliliter(s) By Mouth Daily, Disp: , Rfl:     levocetirizine (XYZAL) 5 MG tablet, Take 1 tablet (5 mg total) by mouth every evening., Disp: 30 tablet, Rfl: 0    losartan (COZAAR) 50 MG tablet, TAKE 1 TABLET EVERY DAY, Disp: 90 tablet, Rfl: 0    melatonin 10 mg Tab, Take 1.5 tablets (15 mg total) by mouth every evening., Disp: 45 tablet, Rfl: 11    metFORMIN (GLUCOPHAGE) 1000 MG tablet, TAKE 1 TABLET TWICE DAILY WITH MEALS, Disp: 180 tablet, Rfl: 0    metoprolol succinate (TOPROL-XL) 50 MG 24 hr tablet,  TAKE 1 TABLET EVERY DAY, Disp: 90 tablet, Rfl: 3    mometasone 0.1% (ELOCON) 0.1 % cream, Apply topically once daily., Disp: 50 g, Rfl: 2    multivitamin (THERAGRAN) per tablet, Take 1 tablet by mouth once daily., Disp: , Rfl:     pramipexole (MIRAPEX) 0.125 MG tablet, Take 1 tablet (0.125 mg total) by mouth every evening., Disp: 90 tablet, Rfl: 2    pravastatin (PRAVACHOL) 40 MG tablet, TAKE 1 TABLET EVERY DAY, Disp: 90 tablet, Rfl: 0    pregabalin (LYRICA) 150 MG capsule, Take 1 capsule (150 mg total) by mouth every evening for 5 days, THEN 1 capsule (150 mg total) 2 (two) times daily., Disp: 120 capsule, Rfl: 0    sildenafiL (VIAGRA) 100 MG tablet, Take 1 tablet (100 mg total) by mouth daily as needed for Erectile Dysfunction., Disp: 15 tablet, Rfl: 5    solifenacin (VESICARE) 10 MG tablet, Take 1 tablet (10 mg total) by mouth once daily., Disp: 30 tablet, Rfl: 11    TRUE METRIX GLUCOSE TEST STRIP Strp, USE AS DIRECTED TO CHECK SUGARS, Disp: 300 strip, Rfl: 3    TRUEPLUS LANCETS 33 gauge Misc, USE AS DIRECTED TO CHECK SUGARS, Disp: 300 each, Rfl: 3    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    Trouble in sleeping     Type 2 diabetes mellitus with diabetic polyneuropathy, without long-term current use of insulin     Urinary incontinence     Uses hearing aid     bilat     Past Surgical History:   Procedure Laterality Date    BACK SURGERY   2019    COLONOSCOPY N/A 6/30/2020    Procedure: COLONOSCOPY;  Surgeon: Joseph Iraheta MD;  Location: [facility] ENDO;  Service: Endoscopy;  Laterality: N/A;    ESOPHAGOGASTRODUODENOSCOPY N/A 6/30/2020    Procedure: EGD (ESOPHAGOGASTRODUODENOSCOPY);  Surgeon: Joseph Iraheta MD;  Location: [facility] ENDO;  Service: Endoscopy;  Laterality: N/A;    HERNIA REPAIR Bilateral 04/18/2017    INJECTION OF JOINT Right 4/14/2023    Procedure: right Synvisc Knee injection;  Surgeon: Hossein Brooks MD;  Location: [facility] PAIN MGT;  Service: Pain Management;  Laterality: Right;    JOINT REPLACEMENT Left 10/09/2017    L YAHIR Dr. Alcocer    LAMINECTOMY  07/22/2016    left elbow  10/2017    procedure to remove gout    lumbar foraminotomy  03/2018    REVISION TOTAL HIP ARTHROPLASTY Left 9/11/2018    Procedure: REVISION, TOTAL ARTHROPLASTY, HIP;  Surgeon: Albaro Alcocer Sr., MD;  Location: [facility];  Service: Orthopedics;  Laterality: Left;    ROTATOR CUFF REPAIR Left 2006    SHOULDER ARTHROSCOPY W/ ROTATOR CUFF REPAIR Right 2009    TRANSFORAMINAL EPIDURAL INJECTION OF STEROID Right 10/11/2024    Procedure: Right L4/5 + L5/S1 TF GAEL;  Surgeon: Hossein Brooks MD;  Location: [facility] PAIN MGT;  Service: Pain Management;  Laterality: Right;     Social History     Socioeconomic History    Marital status:     Number of children: 0    Highest education level: Master's degree (e.g., MA, MS, Olegario, MEd, MSW, CARMEL)   Occupational History    Occupation: retired     Comment: teacher   Tobacco Use    Smoking status: Never     Passive exposure: Never    Smokeless tobacco: Never   Substance and Sexual Activity    Alcohol use: Not Currently    Drug use: No    Sexual activity: Not Currently     Partners: Female   Social History Narrative    . No children. Retired but some part time work - 4 hours a day. 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 Caffeine intake - sugar free cola, Rare coffee intake. Still drives. Does have a Living Will . Has a  nepheugene Gayle, lives in [place]. Has a friend Karina Rossi, locally who could help him.      Social Drivers of Health     Financial Resource Strain: Medium Risk (8/15/2023)    Overall Financial Resource Strain (CARDIA)     Difficulty of Paying Living Expenses: Somewhat hard   Food Insecurity: No Food Insecurity (8/15/2023)    Hunger Vital Sign     Worried About Running Out of Food in the Last Year: Never true     Ran Out of Food in the Last Year: Never true   Transportation Needs: No Transportation Needs (8/15/2023)    PRAPARE - Transportation     Lack of Transportation (Medical): No     Lack of Transportation (Non-Medical): No   Physical Activity: Insufficiently Active (8/15/2023)    Exercise Vital Sign     Days of Exercise per Week: 2 days     Minutes of Exercise per Session: 30 min   Stress: No Stress Concern Present (8/15/2023)    [employer] [place] [employer] - Occupational Stress Questionnaire     Feeling of Stress : Not at all   Housing Stability: Low Risk  (8/15/2023)    Housing Stability Vital Sign     Unable to Pay for Housing in the Last Year: No     Number of Places Lived in the Last Year: 1     Unstable Housing in the Last Year: No     Past/Current Medical/Surgical History, Past/Current Social History,   Past/Current Family History and Past/Current Medications were reviewed in detail.    Objective:     VITAL SIGNS WERE REVIEWED    GENERAL APPEARANCE:     The patient looks comfortable.    BMI    No signs of respiratory distress.    Normal breathing pattern.    No dysmorphic features    Normal eye contact.       GENERAL MEDICAL EXAM:    HEENT:  Head is atraumatic normocephalic.     FUNDOSCOPIC (OPHTHALMOSCOPIC) EXAMINATION showed no disc edema (papilledema).      NECK: No JVD. No visible lesions or goiters.     CHEST-CARDIOPULMONARY: No cyanosis. No tachypnea. Normal respiratory effort.    [id number]: No jaundice. No stomas or lesions.   No visible hernias. No  catheters.     SKIN, HAIR, NAILS: No pathognomonic skin rash.No neurofibromatosis.   No visible lesions.No stigmata of autoimmune disease. No clubbing.    LIMBS: No varicose veins. No visible swelling.    MUSCULOSKELETAL: No visible deformities.No visible lesions.    Neurological Exam      Lab Results   Component Value Date    WBC 11.09 12/18/2023    HGB 11.3 (L) 12/18/2023    HCT 34.5 (L) 12/18/2023    MCV 89 12/18/2023     12/18/2023     Sodium   Date Value Ref Range Status   12/27/2023 147 (H) 136 - 145 mmol/L Final     Potassium   Date Value Ref Range Status   12/27/2023 4.3 3.5 - 5.1 mmol/L Final     Chloride   Date Value Ref Range Status   12/27/2023 110 95 - 110 mmol/L Final     CO2   Date Value Ref Range Status   12/27/2023 24 23 - 29 mmol/L Final     Glucose   Date Value Ref Range Status   12/27/2023 94 70 - 110 mg/dL Final     BUN   Date Value Ref Range Status   12/27/2023 13 8 - 23 mg/dL Final     Creatinine   Date Value Ref Range Status   09/03/2024 1.7 (H) 0.5 - 1.4 mg/dL Final     Calcium   Date Value Ref Range Status   12/27/2023 8.6 (L) 8.7 - 10.5 mg/dL Final     Total Protein   Date Value Ref Range Status   10/12/2023 7.0 6.0 - 8.4 g/dL Final     Albumin   Date Value Ref Range Status   10/12/2023 3.8 3.5 - 5.2 g/dL Final     Total Bilirubin   Date Value Ref Range Status   10/12/2023 0.5 0.1 - 1.0 mg/dL Final     Comment:     For infants and newborns, interpretation of results should be based  on gestational age, weight and in agreement with clinical  observations.    Premature Infant recommended reference ranges:  Up to 24 hours.............<8.0 mg/dL  Up to 48 hours............<12.0 mg/dL  3-5 days..................<15.0 mg/dL  6-29 days.................<15.0 mg/dL       Alkaline Phosphatase   Date Value Ref Range Status   10/12/2023 63 55 - 135 U/L Final     AST   Date Value Ref Range Status   10/12/2023 21 10 - 40 U/L Final     ALT   Date Value Ref Range Status   10/12/2023 17 10 - 44 U/L  "Final     Anion Gap   Date Value Ref Range Status   12/27/2023 13 8 - 16 mmol/L Final     eGFR if    Date Value Ref Range Status   06/01/2022 43 (A) >60 mL/min/1.73 m^2 Final     eGFR if non    Date Value Ref Range Status   06/01/2022 38 (A) >60 mL/min/1.73 m^2 Final     Comment:     Calculation used to obtain the estimated glomerular filtration  rate (eGFR) is the CKD-EPI equation.        Lab Results   Component Value Date    [id number] 958 (H) 06/12/2020     Lab Results   Component Value Date    TSH 1.595 05/23/2023     No results found in the last 24 hours.    LABORATORY EVALUATION    RADIOLOGY EVALUATION     NEUROPHYSIOLOGY EVALUATION     PATHOLOGY EVALUATION      NEUROCOGNITIVE AND NEUROPSYCHOLOGY EVALUATION     Reviewed the neuroimaging independently     Assessment:   83 Years old AA Male with PMHX as above came of the evaluation of " balance issues ".    -  Oa's Right Hip.  - Oa's Bilateral Knees.   - Sensory Polyneuropathy.   - Sensory Ataxia.   Plan:   Stable, no falls.   NCS - mostly CTS issues / to me some small fibers Neuropathy most probable is present.  PN profile.   X-Ray cervical spine.     Patient Neurological Assessment is for major joints Oa's changes / post left Hip replacement / sensory Ataxia - most probable due to   Polyneuropathy caused by DM.      NCS/EMG - CTS.      Labs:  Lipids panel / CBC - 2024 - non significant abnormalities.  Hgb A 1 C: 6.2 to 6.4 in the last 3 years or so.   CMP: Creatinine -  1.7  / GFR - 50     Personally reviewed Lumbar spine - 08/ 2024 - multi DGD's / no acute changes.   Postoperative and mild degenerative changes of the lumbar spine.   No high-grade central canal or neural foraminal canal narrowing suggested.     X-Ray Knees - 01/ 2024 - Mild tricompartmental osteoarthritis right knee.     Ortho Sx - following / steroids injections after conservative treatment / pain management - nerve block ?     MEDICAL/SURGICAL COMORBIDITIES "     All relevant medical comorbidities noted and managed by primary care physician and medical care team.      HEALTHY LIFESTYLE AND PREVENTATIVE CARE    The patient to adhere to the age-appropriate health maintenance guidelines including screening tests and vaccinations.   The patient to adhere to  healthy lifestyle, optimal weight, exercise, healthy diet,   good sleep hygiene and avoiding drugs including smoking, alcohol and recreational drugs.    RTC: 3 months.     Please do not hesitate to contact me with any updates, questions or concerns.    Placido Ugalde MD  General Neurology.

## 2024-12-16 ENCOUNTER — HOSPITAL ENCOUNTER (OUTPATIENT)
Facility: HOSPITAL | Age: 84
Discharge: HOME | End: 2024-12-16
Attending: STUDENT IN AN ORGANIZED HEALTH CARE EDUCATION/TRAINING PROGRAM | Admitting: STUDENT IN AN ORGANIZED HEALTH CARE EDUCATION/TRAINING PROGRAM
Payer: MEDICARE

## 2024-12-16 VITALS
OXYGEN SATURATION: 97 % | HEART RATE: 60 BPM | HEIGHT: 75 IN | DIASTOLIC BLOOD PRESSURE: 78 MMHG | WEIGHT: 220 LBS | TEMPERATURE: 98 F | SYSTOLIC BLOOD PRESSURE: 158 MMHG | BODY MASS INDEX: 27.35 KG/M2 | RESPIRATION RATE: 17 BRPM

## 2024-12-16 DIAGNOSIS — G89.29 CHRONIC PAIN: ICD-10-CM

## 2024-12-16 DIAGNOSIS — M25.551 PAIN OF RIGHT HIP: ICD-10-CM

## 2024-12-16 DIAGNOSIS — M16.11 PRIMARY OSTEOARTHRITIS OF RIGHT HIP: Primary | ICD-10-CM

## 2024-12-16 LAB — POCT GLUCOSE: 119 MG/DL (ref 70–110)

## 2024-12-16 PROCEDURE — 25500020 PHARM REV CODE 255: Performed by: STUDENT IN AN ORGANIZED HEALTH CARE EDUCATION/TRAINING PROGRAM

## 2024-12-16 PROCEDURE — 20610 DRAIN/INJ JOINT/BURSA W/O US: CPT | Mod: HCNC,RT,, | Performed by: STUDENT IN AN ORGANIZED HEALTH CARE EDUCATION/TRAINING PROGRAM

## 2024-12-16 PROCEDURE — 82962 GLUCOSE BLOOD TEST: CPT | Performed by: STUDENT IN AN ORGANIZED HEALTH CARE EDUCATION/TRAINING PROGRAM

## 2024-12-16 PROCEDURE — 63600175 PHARM REV CODE 636 W HCPCS: Mod: JZ,JG | Performed by: STUDENT IN AN ORGANIZED HEALTH CARE EDUCATION/TRAINING PROGRAM

## 2024-12-16 PROCEDURE — 25000003 PHARM REV CODE 250: Performed by: STUDENT IN AN ORGANIZED HEALTH CARE EDUCATION/TRAINING PROGRAM

## 2024-12-16 PROCEDURE — 77002 NEEDLE LOCALIZATION BY XRAY: CPT | Mod: 26,HCNC,, | Performed by: STUDENT IN AN ORGANIZED HEALTH CARE EDUCATION/TRAINING PROGRAM

## 2024-12-16 PROCEDURE — 20610 DRAIN/INJ JOINT/BURSA W/O US: CPT | Mod: RT | Performed by: STUDENT IN AN ORGANIZED HEALTH CARE EDUCATION/TRAINING PROGRAM

## 2024-12-16 RX ORDER — ALPRAZOLAM 0.5 MG/1
0.5 TABLET, ORALLY DISINTEGRATING ORAL
Status: DISCONTINUED | OUTPATIENT
Start: 2024-12-16 | End: 2024-12-16 | Stop reason: HOSPADM

## 2024-12-16 RX ORDER — TRIAMCINOLONE ACETONIDE 40 MG/ML
INJECTION, SUSPENSION INTRA-ARTICULAR; INTRAMUSCULAR
Status: DISCONTINUED | OUTPATIENT
Start: 2024-12-16 | End: 2024-12-16 | Stop reason: HOSPADM

## 2024-12-16 RX ORDER — LIDOCAINE HYDROCHLORIDE 20 MG/ML
INJECTION, SOLUTION EPIDURAL; INFILTRATION; INTRACAUDAL; PERINEURAL
Status: DISCONTINUED | OUTPATIENT
Start: 2024-12-16 | End: 2024-12-16 | Stop reason: HOSPADM

## 2024-12-16 RX ORDER — METOPROLOL SUCCINATE 50 MG/1
50 TABLET, EXTENDED RELEASE ORAL DAILY
Qty: 90 TABLET | Refills: 0 | Status: SHIPPED | OUTPATIENT
Start: 2024-12-16

## 2024-12-16 RX ORDER — BUPIVACAINE HYDROCHLORIDE 2.5 MG/ML
INJECTION, SOLUTION EPIDURAL; INFILTRATION; INTRACAUDAL
Status: DISCONTINUED | OUTPATIENT
Start: 2024-12-16 | End: 2024-12-16 | Stop reason: HOSPADM

## 2024-12-16 RX ADMIN — ALPRAZOLAM 0.5 MG: 0.5 TABLET, ORALLY DISINTEGRATING ORAL at 01:12

## 2024-12-16 NOTE — DISCHARGE INSTRUCTIONS
[facility] [place]  Dr. Merissa Kelly  Messaging service # [phone]    POST-PROCEDURE INSTRUCTIONS:    Today you had an injection that included a steroid medications.  The steroid may or may not have been mixed with a local anesthetic when it was injected.   If the injection was in the neck, you may feel some pressure, numbness, or slight weakness in the arm after the procedure for a short period of time (this is a normal response), if this persists for longer than 1 day please contact our office or go to the emergency room.  If the injection was in the low back, you may feel some pressure, numbness, or slight weakness in the leg after the procedure for a short period of time (this is a normal response), if this persists for longer than 1 day please contact our office or go to the emergency room.  You may get side effects from the steroid.  This is not uncommon.  Symptoms include: elevated blood sugar, elevated blood pressure, headache, flushing, nausea, insomnia.  These symptoms are transient and will resolve within 1-3 days.  If symptoms last longer than this please contact our office or head to the emergency room.  Steroid medications can take anywhere from 3-14 days to take effect (rarely longer).  You may notice that your pain worsens for a short period of time after the injection, this would not be unusual due to the pressure and trauma from the needle.    If you do not have a follow up appointment scheduled, please contact my office (or the office of the physician who referred you for the procedure) to get a post-procedure follow up scheduled 2-4 weeks after the procedure.  This can be done as a virtual visit if that is more convenient for you.      What you need to do:    Keep a record of your response to the injection you had today.    How much relief did you get?   When did the relief start and how long did it last?  Were you able to decrease the use of any of your pain  medications?  Were you able to increase your level of activity?  How long did the relief last?    What to watch out for:    If you experience any of the following symptoms after your procedure, please notify the messaging service immediately (see above for contact information):   fever (increased oral temperature)   bleeding or swelling at the injection site,    drainage, rash or redness at the injection site    possible signs of infection    increased pain at the injection site   worsening of your usual pain   severe headache   new or worsening numbness    new arm and/or leg weakness, or    changes in bowel and/or bladder function: urinating or defecating on yourself and not knowing that you did it.    PLEASE FOLLOW ALL INSTRUCTIONS CAREFULLY     Do not engage in strenuous activity (e.g., lifting or pushing heavy objects or repeated bending) for 24 hours.     Do not take a bath, swim or use Jacuzzi for 24 hours after procedure. (A shower is fine).   Remove any Band-Aids when you get home.    Use cold/ice, as needed for comfort.  We recommend the use of cold therapy alternating on for 20 minutes, off for 20 minutes.    Do not apply direct heat (heating pad or heat packs) to the injection site for 24 hours.     Resume your usual medications, unless instructed otherwise by your Pain Physician.     If you are on warfarin (Coumadin) or other blood thinner, resume this medication as instructed by your prescribing Physician.    IF AT ANY POINT YOU ARE VERY CONCERNED ABOUT YOUR SYMPTOMS, PLEASE GO TO THE EMERGENCY ROOM.    If you develop worsening pain, weakness, numbness, lose bowel or bladder control (i.e., having an accident where you did not even know you had to go to the bathroom and suddenly noticed you soiled yourself), saddle anesthesia (a loss of sensation restricted to the area of the buttocks, anus and between the legs -- i.e., those parts of your body that would touch a saddle if you were sitting on one) you  need to go immediately to the emergency department for evaluation and treatment.    ----------------------------------------------------------------------------------------------------------------------------------------------------------------  If you received Sedation please read the following instructions:  POST SEDATION INSTRUCTIONS    Today you received intravenous medication (also known as sedation) that was used to help you relax and/or decrease discomfort during your procedure. This medication will be acting in your body for the next 24 hours, so you might feel a little tired or sleepy. This feeling will slowly wear off.   Common side effects associated with these medications include: drowsiness, dizziness, sleepiness, confusion, feeling excited, difficulty remembering things, lack of steadiness with walking or balance, loss of fine muscle control, slowed reflexes, difficulty focusing, and blurred vision.  Some over-the-counter and prescription medications (e.g., muscle relaxants, opioids, mood-altering medications, sedatives/hypnotics, antihistamines) can interact with the intravenous medication you received and cause an increased risk of the side effects listed above in addition to other potentially life threatening side effects. Use extreme caution if you are taking such medications, and consult with your Pain Physician or prescribing physician if you have any questions.  For the next 12-24 hours:    DO NOT--Drive a car, operate machinery or power tools   DO NOT--Drink any alcoholic beverages (not even beer), they may dangerously increase the risk of side effects.    DO NOT--Make any important legal or business decisions or sign important documents.  We advise you to have someone to assist you at home. Move slowly and carefully. Do not make sudden changes in position. Be aware of dizziness or light-headedness and move accordingly.   If you seek medical treatment within 24 hours, let the nurse or doctor  caring for you know that you have received the above medications. If you have any questions or concerns related to your sedation or treatment today please contact us.

## 2024-12-16 NOTE — H&P
HPI  Patient presenting for Procedure(s) (LRB):  RT Intra-articular hip injection (Right)     Patient on Anti-coagulation No    No health changes since previous encounter    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    Trouble in sleeping     Type 2 diabetes mellitus with diabetic polyneuropathy, without long-term current use of insulin     Urinary incontinence     Uses hearing aid     bilat     Past Surgical History:   Procedure Laterality Date    BACK SURGERY  2019    COLONOSCOPY N/A 6/30/2020    Procedure: COLONOSCOPY;  Surgeon: Joseph Iraheta MD;  Location: [facility];  Service: Endoscopy;  Laterality: N/A;    ESOPHAGOGASTRODUODENOSCOPY N/A 6/30/2020    Procedure: EGD (ESOPHAGOGASTRODUODENOSCOPY);  Surgeon: Joseph Iraheta MD;  Location: [facility];  Service: Endoscopy;  Laterality: N/A;    HERNIA REPAIR Bilateral 04/18/2017    INJECTION OF JOINT Right 4/14/2023    Procedure: right Synvisc Knee injection;  Surgeon: Hossein Brooks MD;  Location: [facility] PAIN MGT;  Service: Pain Management;  Laterality: Right;    JOINT REPLACEMENT Left 10/09/2017    L YAHIR Dr. Alcocer    LAMINECTOMY  07/22/2016    left elbow  10/2017    procedure to remove gout    lumbar foraminotomy  03/2018    REVISION TOTAL HIP ARTHROPLASTY Left 9/11/2018    Procedure: REVISION, TOTAL ARTHROPLASTY, HIP;  Surgeon: Albaro Alcocer Sr., MD;  Location: [facility];   Service: Orthopedics;  Laterality: Left;    ROTATOR CUFF REPAIR Left 2006    SHOULDER ARTHROSCOPY W/ ROTATOR CUFF REPAIR Right 2009    TRANSFORAMINAL EPIDURAL INJECTION OF STEROID Right 10/11/2024    Procedure: Right L4/5 + L5/S1 TF GAEL;  Surgeon: Hossein Brooks MD;  Location: [facility];  Service: Pain Management;  Laterality: Right;     Review of patient's allergies indicates:   Allergen Reactions    Sulfa (sulfonamide antibiotics)      Can't recall from 1995      No current facility-administered medications for this encounter.       PMHx, PSHx, Allergies, Medications reviewed in epic    ROS negative except pain complaints in HPI    OBJECTIVE:    There were no vitals taken for this visit.    PHYSICAL EXAMINATION:    GENERAL: Well appearing, in no acute distress, alert and oriented x3.  PSYCH:  Mood and affect appropriate.  SKIN: Skin color, texture, turgor normal, no rashes or lesions which will impact the procedure.  CV: RRR with palpation of the radial artery.  PULM: No evidence of respiratory difficulty, symmetric chest rise. Clear to auscultation.  NEURO: Cranial nerves grossly intact.    Plan:    Proceed with procedure as planned Procedure(s) (LRB):  RT Intra-articular hip injection (Right)    Merissa Kelly  12/16/2024

## 2024-12-16 NOTE — OP NOTE
Hip Joint Injection under Fluoroscopic Guidance    The procedure, risks, benefits, and options were discussed with the patient. There are no contraindications to the procedure. The patent expressed understanding and agreed to the procedure. Informed written consent was obtained prior to the start of the procedure and can be found in the patient's chart.    PATIENT NAME: Srinath Mcknight   MRN: [id number]     DATE OF PROCEDURE: 12/16/2024    PROCEDURE: Right Hip Joint Injection under Fluoroscopic Guidance    PRE-OP DIAGNOSIS: Primary osteoarthritis of right hip [M16.11]  Hip pain, unspecified laterality [M25.559]    POST-OP DIAGNOSIS: Primary osteoarthritis of right hip [M16.11]  Hip pain, unspecified laterality [M25.559]    PHYSICIAN: Merissa Kelly DO    ASSISTANTS: None     MEDICATIONS INJECTED: Preservative-free Kenalog 40mg with 3cc of Bupivacine 0.25%     LOCAL ANESTHETIC INJECTED: Xylocaine 2%     SEDATION: None    ESTIMATED BLOOD LOSS: None    COMPLICATIONS: None    TECHNIQUE: Time-out was performed to identify the patient and procedure to be performed. With the patient laying in a supine position, the surgical area was prepped and draped in the usual sterile fashion using ChloraPrep and a fenestrated drape. The area overlying the hip joint was determined under fluoroscopy guidance. Skin anesthesia was achieved by injecting Lidocaine 2% over the injection site. A 22 gauge, 3.5 inch spinal quinke needle was introduced under fluoroscopy until the tip reached the greater trochanter. The tip of the needle was hinged cephalad from the greater trochanter into the joint space.  When the needle tip was in the appropriate position, and there was no blood aspiration, Contrast dye  Omnipaque (300mg/mL) was injected to confirm placement and there was no vascular runoff. 4 mL of the medication mixture listed above was injected slowly. The needles were removed and bleeding was nil. A sterile dressing was applied. No  specimens collected. The patient tolerated the procedure well.      The patient was monitored after the procedure in the recovery area. They were given post-procedure and discharge instructions to follow at home. The patient was discharged in a stable condition.      Merissa Kelly DO

## 2024-12-16 NOTE — DISCHARGE SUMMARY
Discharge Note  Short Stay      SUMMARY     Admit Date: 12/16/2024    Attending Physician: Merissa Kelly      Discharge Physician: Merissa Kelly      Discharge Date: 12/16/2024 2:19 PM    Procedure(s) (LRB):  RT Intra-articular hip injection (Right)    Final Diagnosis: Primary osteoarthritis of right hip [M16.11]  Hip pain, unspecified laterality [M25.559]    Disposition: Home or self care    Patient Instructions:   Current Discharge Medication List        CONTINUE these medications which have NOT CHANGED    Details   alfuzosin (UROXATRAL) 10 mg Tb24 Take 1 tablet (10 mg total) by mouth daily with breakfast.  Qty: 30 tablet, Refills: 11      allopurinoL (ZYLOPRIM) 100 MG tablet TAKE 1 TABLET EVERY DAY  Qty: 90 tablet, Refills: 0      cyanocobalamin, vitamin B-12, 1,000 mcg Subl Place 1,000 mcg under the tongue once daily.  Qty: 90 tablet, Refills: 3    Associated Diagnoses: Nutritional anemia      GARLIC EXTRACT ORAL Take 1 tablet by mouth once daily. Per [facility]      losartan (COZAAR) 50 MG tablet TAKE 1 TABLET EVERY DAY  Qty: 90 tablet, Refills: 0    Comments: .  Associated Diagnoses: Hypertension associated with diabetes      melatonin 10 mg Tab Take 1.5 tablets (15 mg total) by mouth every evening.  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  Qty: 90 tablet, Refills: 0    Comments: .      multivitamin (THERAGRAN) per tablet Take 1 tablet by mouth once daily.      pramipexole (MIRAPEX) 0.125 MG tablet Take 1 tablet (0.125 mg total) by mouth every evening.  Qty: 90 tablet, Refills: 2    Associated Diagnoses: PLMD (periodic limb movement disorder)      pravastatin (PRAVACHOL) 40 MG tablet TAKE 1 TABLET EVERY DAY  Qty: 90 tablet, Refills:  0      pregabalin (LYRICA) 150 MG capsule Take 1 capsule (150 mg total) by mouth every evening for 5 days, THEN 1 capsule (150 mg total) 2 (two) times daily.  Qty: 120 capsule, Refills: 0      solifenacin (VESICARE) 10 MG tablet Take 1 tablet (10 mg total) by mouth once daily.  Qty: 30 tablet, Refills: 11      blood glucose control, low Soln by Misc.(Non-Drug; Combo Route) route.      blood-glucose meter (TRUE METRIX AIR GLUCOSE METER) kit USE AS DIRECTED  Qty: 1 each, Refills: 0      clonazePAM (KLONOPIN) 0.5 MG tablet Take 0.5 mg by mouth 2 (two) times daily as needed for Anxiety.      clotrimazole (LOTRIMIN) 1 % cream Apply topically 2 (two) times daily. for 10 days  Qty: 60 g, Refills: 0    Associated Diagnoses: Candidiasis      diclofenac sodium (VOLTAREN) 1 % Gel Apply 2 g topically 4 (four) times daily as needed (pain).  Qty: 200 g, Refills: 2    Associated Diagnoses: Chronic pain of right knee      fluticasone propionate (FLONASE) 50 mcg/actuation nasal spray 1 spray (50 mcg total) by Each Nostril route once daily.  Qty: 16 mL, Refills: 0    Associated Diagnoses: Viral URI with cough; Sinus congestion; Post-nasal drip      lactulose (CHRONULAC) 10 gram/15 mL solution SMARTSI Milliliter(s) By Mouth Daily      levocetirizine (XYZAL) 5 MG tablet Take 1 tablet (5 mg total) by mouth every evening.  Qty: 30 tablet, Refills: 0    Associated Diagnoses: Viral URI with cough; Sinus congestion; Post-nasal drip      mometasone 0.1% (ELOCON) 0.1 % cream Apply topically once daily.  Qty: 50 g, Refills: 2    Associated Diagnoses: Pruritus      sildenafiL (VIAGRA) 100 MG tablet Take 1 tablet (100 mg total) by mouth daily as needed for Erectile Dysfunction.  Qty: 15 tablet, Refills: 5    Associated Diagnoses: Erectile dysfunction due to arterial insufficiency      TRUE METRIX GLUCOSE TEST STRIP Strp USE AS DIRECTED TO CHECK SUGARS  Qty: 300 strip, Refills: 3    Associated Diagnoses: Type 2 diabetes mellitus with diabetic  polyneuropathy, without long-term current use of insulin      TRUEPLUS LANCETS 33 gauge Misc USE AS DIRECTED TO CHECK SUGARS  Qty: 300 each, Refills: 3                 Discharge Diagnosis: Primary osteoarthritis of right hip [M16.11]  Hip pain, unspecified laterality [M25.559]  Condition on Discharge: Stable with no complications to procedure   Diet on Discharge: Same as before.  Activity: as per instruction sheet.  Discharge to: Home with a responsible adult.  Follow up: 2-4 weeks       Please call my office or pager at [phone] if experienced any weakness or loss of sensation, fever > 101.5, pain uncontrolled with oral medications, persistent nausea/vomiting/or diarrhea, redness or drainage from the incisions, or any other worrisome concerns. If physician on call was not reached or could not communicate with our office for any reason please go to the nearest emergency department

## 2024-12-19 ENCOUNTER — OFFICE VISIT (OUTPATIENT)
Dept: NEUROLOGY | Facility: CLINIC | Age: 84
End: 2024-12-19
Payer: MEDICARE

## 2024-12-19 ENCOUNTER — HOSPITAL ENCOUNTER (OUTPATIENT)
Dept: RADIOLOGY | Facility: HOSPITAL | Age: 84
Discharge: HOME | End: 2024-12-19
Attending: PSYCHIATRY & NEUROLOGY
Payer: MEDICARE

## 2024-12-19 VITALS
HEIGHT: 75 IN | BODY MASS INDEX: 27.35 KG/M2 | HEART RATE: 60 BPM | SYSTOLIC BLOOD PRESSURE: 119 MMHG | DIASTOLIC BLOOD PRESSURE: 58 MMHG | WEIGHT: 220 LBS

## 2024-12-19 DIAGNOSIS — G60.8 SENSORY POLYNEUROPATHY: ICD-10-CM

## 2024-12-19 DIAGNOSIS — M47.812 OSTEOARTHRITIS OF CERVICAL SPINE, UNSPECIFIED SPINAL OSTEOARTHRITIS COMPLICATION STATUS: ICD-10-CM

## 2024-12-19 DIAGNOSIS — M47.812 OSTEOARTHRITIS OF CERVICAL SPINE, UNSPECIFIED SPINAL OSTEOARTHRITIS COMPLICATION STATUS: Primary | ICD-10-CM

## 2024-12-19 PROCEDURE — 1101F PT FALLS ASSESS-DOCD LE1/YR: CPT | Mod: HCNC,CPTII,S$GLB, | Performed by: PSYCHIATRY & NEUROLOGY

## 2024-12-19 PROCEDURE — 3074F SYST BP LT 130 MM HG: CPT | Mod: HCNC,CPTII,S$GLB, | Performed by: PSYCHIATRY & NEUROLOGY

## 2024-12-19 PROCEDURE — 72050 X-RAY EXAM NECK SPINE 4/5VWS: CPT | Mod: 26,HCNC,, | Performed by: RADIOLOGY

## 2024-12-19 PROCEDURE — 99999 PR PBB SHADOW E&M-EST. PATIENT-LVL V: CPT | Mod: PBBFAC,HCNC,, | Performed by: PSYCHIATRY & NEUROLOGY

## 2024-12-19 PROCEDURE — 72050 X-RAY EXAM NECK SPINE 4/5VWS: CPT | Mod: TC,HCNC,FY,PO

## 2024-12-19 PROCEDURE — 1159F MED LIST DOCD IN RCRD: CPT | Mod: HCNC,CPTII,S$GLB, | Performed by: PSYCHIATRY & NEUROLOGY

## 2024-12-19 PROCEDURE — 1160F RVW MEDS BY RX/DR IN RCRD: CPT | Mod: HCNC,CPTII,S$GLB, | Performed by: PSYCHIATRY & NEUROLOGY

## 2024-12-19 PROCEDURE — 1125F AMNT PAIN NOTED PAIN PRSNT: CPT | Mod: HCNC,CPTII,S$GLB, | Performed by: PSYCHIATRY & NEUROLOGY

## 2024-12-19 PROCEDURE — 3078F DIAST BP <80 MM HG: CPT | Mod: HCNC,CPTII,S$GLB, | Performed by: PSYCHIATRY & NEUROLOGY

## 2024-12-19 PROCEDURE — 99213 OFFICE O/P EST LOW 20 MIN: CPT | Mod: HCNC,S$GLB,, | Performed by: PSYCHIATRY & NEUROLOGY

## 2024-12-19 PROCEDURE — 3288F FALL RISK ASSESSMENT DOCD: CPT | Mod: HCNC,CPTII,S$GLB, | Performed by: PSYCHIATRY & NEUROLOGY

## 2024-12-20 ENCOUNTER — TELEPHONE (OUTPATIENT)
Dept: PAIN MEDICINE | Facility: CLINIC | Age: 84
End: 2024-12-20
Payer: MEDICARE

## 2025-01-03 RX ORDER — ALLOPURINOL 100 MG/1
100 TABLET ORAL
Qty: 90 TABLET | Refills: 3 | Status: SHIPPED | OUTPATIENT
Start: 2025-01-03

## 2025-01-03 RX ORDER — PRAVASTATIN SODIUM 40 MG/1
TABLET ORAL
Qty: 90 TABLET | Refills: 3 | Status: SHIPPED | OUTPATIENT
Start: 2025-01-03

## 2025-01-03 NOTE — TELEPHONE ENCOUNTER
Care Due:                  Date            Visit Type   Department     Provider  --------------------------------------------------------------------------------                                EP -                              PRIMARY      [facility] INTERNAL  Last Visit: 12-      CARE ([facility])   MEDICINE       Yeison Wilder  Next Visit: None Scheduled  None         None Found                                                            Last  Test          Frequency    Reason                     Performed    Due Date  --------------------------------------------------------------------------------    CBC.........  12 months..  allopurinoL..............  12- 12-    CMP.........  12 months..  allopurinoL, losartan,     10-   10-                             pravastatin..............    HBA1C.......  6 months...  metFORMIN................  04-   10-    Lipid Panel.  12 months..  pravastatin..............  10-   10-    Uric Acid...  12 months..  allopurinoL..............  Not Found    Overdue    [employer] Embedded Care Due Messages. Reference number: [id number].   1/02/2025 1:32:16 AM CST  
Refill Routing Note   Medication(s) are not appropriate for processing by [facility] for the following reason(s):        Required labs outdated    [facility] action(s):  Defer     Requires labs : Yes             Appointments  past 12m or future 3m with PCP    Date Provider   Last Visit   12/18/2023 Yeison Wilder MD   Next Visit   Visit date not found Yeison Wilder MD   ED visits in past 90 days: 0        Note composed:8:23 PM 01/02/2025          
vomiting

## 2025-01-06 ENCOUNTER — TELEPHONE (OUTPATIENT)
Dept: PAIN MEDICINE | Facility: CLINIC | Age: 85
End: 2025-01-06
Payer: MEDICARE

## 2025-01-06 NOTE — TELEPHONE ENCOUNTER
Reached out to patient to reschedule appointment because the provider will be out of clinic.  Apt has been made . All questions answered.     Deven Lugo  Medical

## 2025-01-08 ENCOUNTER — OFFICE VISIT (OUTPATIENT)
Dept: PAIN MEDICINE | Facility: CLINIC | Age: 85
End: 2025-01-08
Payer: MEDICARE

## 2025-01-08 ENCOUNTER — CLINICAL SUPPORT (OUTPATIENT)
Dept: AUDIOLOGY | Facility: CLINIC | Age: 85
End: 2025-01-08
Payer: MEDICARE

## 2025-01-08 VITALS
HEIGHT: 75 IN | BODY MASS INDEX: 27.35 KG/M2 | WEIGHT: 220 LBS | DIASTOLIC BLOOD PRESSURE: 54 MMHG | RESPIRATION RATE: 17 BRPM | SYSTOLIC BLOOD PRESSURE: 99 MMHG | HEART RATE: 72 BPM

## 2025-01-08 DIAGNOSIS — Z96.642 HISTORY OF LEFT HIP REPLACEMENT: ICD-10-CM

## 2025-01-08 DIAGNOSIS — Z98.1 HISTORY OF LUMBAR FUSION: ICD-10-CM

## 2025-01-08 DIAGNOSIS — M17.11 PRIMARY OSTEOARTHRITIS OF RIGHT KNEE: ICD-10-CM

## 2025-01-08 DIAGNOSIS — M16.11 PRIMARY OSTEOARTHRITIS OF RIGHT HIP: Primary | ICD-10-CM

## 2025-01-08 DIAGNOSIS — Z98.890 HISTORY OF LUMBAR LAMINECTOMY: ICD-10-CM

## 2025-01-08 DIAGNOSIS — H90.3 HEARING LOSS, SENSORINEURAL, ASYMMETRICAL: Primary | ICD-10-CM

## 2025-01-08 PROCEDURE — 92567 TYMPANOMETRY: CPT | Mod: HCNC,S$GLB,, | Performed by: AUDIOLOGIST

## 2025-01-08 PROCEDURE — 99999 PR PBB SHADOW E&M-EST. PATIENT-LVL V: CPT | Mod: PBBFAC,HCNC,, | Performed by: PHYSICIAN ASSISTANT

## 2025-01-08 PROCEDURE — 92557 COMPREHENSIVE HEARING TEST: CPT | Mod: HCNC,S$GLB,, | Performed by: AUDIOLOGIST

## 2025-01-08 NOTE — PROGRESS NOTES
"Established Patient Chronic Pain Note (Follow-up Visit)    Referring Physician: Yeison Wilder MD    PCP: Yeison Wilder MD      Chief complaint:  Follow-up     low back pain with RLE radicular pain (now bilateral)  Right knee pain   Right hip pain, occasional left hip pain (h/o left replacement)          Interval History (1/8/2025):  Patient presents today for follow-up visit.  Patient was last seen on 11/6/2024 by our clinic but has seen seen Orthopedics in [place], then referred to pain mgmt there.  At the last visit with me, patient was started on Lyrica.  Patient reports pain as 5/10 today.  S/p Right Intra-articular hip injection on 12/16/2024 with Dr. Merissa Kelly with some pain relief, maybe 75-80% pain relief.  Today, he complains of right knee and low back pain. He reports no pain relief with previous lumbar injection or knee injection (visco and steroid).   At last visit, he was started on Lyrica. He was unsure what medication is causing light headedness and risk for falls. He reports he stopped taking Klonopin because of this, which he feels was the cause of the dizziness/ confusion. He reports he "jumps up at night seeing things" some nights. He reports he has been off of the Klonopin for about 3 weeks.     11/15/2024 - Dr. Kelly (Pain Mgmt): Srinath Mcknight is a 83 y.o. male who presents complaining of right hip pain that radiates into the right thigh and right knee. Patient was referred by Dr. Wall (Orthopedics).   He recently underwent right L4-L5 and L5-S1 transforaminal epidural steroid injection with little to no relief. He was recently evaluated by Orthopedics and found to have severe osteoarthritis in the right hip.  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  Orthopedics recommending right intra-articular hip injection.  He does have a history of total hip replacement on the left. Patient was previously following at the [facility] pain management clinic.   11/13/24 - Dr. Wall " (Ortho): Patient presents with multiple pain complaints affecting his lower back, both hips, both thighs, and right knee, with symptoms persisting for about 3-4 years. His right knee is worse than the left. He expresses frustration with previous medical care, stating that he has been going in circles when describing his experience with different specialists. He uses two canes for walking due to balance issues, explaining that he tends to fall in the direction he's leaning without the support. He denies feeling dizzy or lightheaded when unsteady.  He has a history of hip replacement surgery, which required two procedures on the same hip. He has also undergone back surgeries, with two performed at the spine clinic in [place] and the first one in 2017.  Recently, he has been experiencing slurred speech for the last two weeks or more. His wife notes that he sleeps excessively.  He reports pain and burning sensations in his feet, especially at night. He describes his knee pain as feeling like sharp, pin sensations at night, stating that he has to hold it as it won't straighten out. He also experiences pain in the groin area when walking.  He has previously received injections for pain management but reports dissatisfaction with his current pain management care in [place], stating that pain management has indicated they have exhausted their treatment options.  Regarding his back pain, he experiences discomfort when getting up and walking. He has tried using a brace for support but found it ineffective, stating that it does not provide adequate support for his upper abdominal area.  He denies feeling dizzy or lightheaded when unsteady. He denies having Parkinson's disease.    Interval History (11/6/2024): Srinath Mcknight presents today for follow-up visit.  he underwent right L4/5 + L5/S1 TF GAEL on 10/11/24.  The patient reports that he is/was slightly better following the procedure.    Patient reports pain as  "7/10 today.  He is having hip and knee pain as well.  He stopped taking Lyrica when he ran out of the prescription.  He does not want to do physical therapy because he can do it on his own.  He has not seen Neurosurgery in over 7 months per reporting.    Interval History (9/25/2024):  Patient presents today for follow-up visit to review lumbar MRI results.   Patient reports pain as 5/10 today.  Continues to have right leg pain along the L4 dermatome.  He feels that he has been told that the pain is generating from different sources.  He was told in the past that he would require a right hip replacement.  He has a history of left hip replacement and has no pain on that side.  He also has right knee pain, which did not improve with injections.  He also reports that he was told in the past that 1 leg is shorter than the other, which she uses a leg lift insert for; he does feel this helps some.  He finished physical therapy 2 months ago.    Interval history 08/01/2024  Srinath Mcknight is a 83 y.o. male with past medical history significant for restrictive lung disease, hyperlipidemia, hypertension, type 2 diabetes complicated by peripheral angiopathy and nonproliferative retinopathy, stage 3 chronic kidney disease who presents to the clinic for the evaluation of lower back and leg pain. Today he reports pain is constant and is rated a 10/10.  Of note patient has history of prior L3-4 lumbar fusion and L4-5 lumbar laminectomy with Dr. Iqbal.  Today reports pain in the lower back which can radiate down the anterior aspects of bilateral lower extremities in L3-5 distribution to the knee.  Pain feels like a ball" in the thigh.  Pain is worse on the right than on the left.  Pain is exacerbated with positional changes moving from sitting to standing and with standing and with ambulation.  Patient is able to ambulate with assistive device, cane only a few minutes before requiring rest.  He does endorse associated weakness " in the lower extremities associated with his pain.  Pain is improved with sitting.  Of note patient has continued Lyrica 75 mg twice daily but he is unsure of its degree of benefit.  He reports cumbersome polypharmacy and is unsure which medications are effective. Patient has continued conventional land-based physical therapy from 05/03/2024, 07/03/2024, twice weekly sessions--13 sessions total.  He reports physical therapy is beneficial only while he is actively in the session.  Patient has plans to join a gym for exercise.  He also reports longstanding right knee pain.  Patient reports receiving corticosteroid injection and series of 3 viscosupplementation with Dr. Winters (1-3/2024) without relief exceeding 1 day.  Patient has not been evaluated for surgical intervention but secondary to history of prior numerous surgeries, he would like to avoid surgical intervention if possible.    Patient reports significant motor weakness and loss of sensations.  Patient denies night fever/night sweats, urinary incontinence, bowel incontinence, and significant weight loss.        Pain Disability Index (PDI) Score Review:      1/8/2025     1:07 PM 11/15/2024     2:48 PM 11/6/2024     1:24 PM   Last 3 PDI Scores   Pain Disability Index (PDI) 35 47 49       Non-Pharmacologic Treatments:  Physical Therapy/Home Exercise: yes  Ice/Heat:yes  TENS: no  Acupuncture: no  Massage: yes  Chiropractic: no    Relevant sx:  -Dr. Alcocer: L YAHIR 08/15/2017, L hip arthroplasty resection 09/04/2018  -Dr. Iqbal: posterior lumbar fusion      Pain Medications:  - Adjuvant Medications: Lyrica ( Pregabalin) and Topical Ointment (Voltaren Gel, Steroid cream, Anti-Inflammatory Cream, Compound cream)      Pain Procedures:     Dr. Merissa Kelly:  -12/16/2024: Right Intra-articular hip injection with 75-80% pain relief    Dr. Brooks:  -04/14/2023:  right knee IA Synvisc injection  -10/11/2024: right L4/5 + L5/S1 TF GAEL     Dr. Roldan:  -02/15/2018:   Left-sided SI joint injection  -01/24/2018: Left-sided L4-5 and L5-S1 transforaminal epidural steroid injection    Dr. Hutchinson:  -06/14/2016: Left-sided L3-4 facet injection and cyst aspiration    Dr. Madera:  -05/18/2016:  Left hip intra-articular joint injection    Dr. Douglas:  -09/06/2023: Right radiocarpal injection    Dr. Winters:  -02/16/2024, 02/23/2024, 03/01/2024: Right knee intra-articular joint injection with Orthovisc  -01/02/2024: Right knee intra-articular joint injection with triamcinolone  -11/09/2022: Right knee intra-articular joint injection with triamcinolone  -11/10/2021: Right knee intra-articular joint injection with triamcinolone      Interval History (8/16/2023):   Patient presents today for follow-up visit.  Patient was last seen on 7/17/2023. At that visit, the plan was to  Schedule right genicular nerve block, but it was not approved with insurance. Patient reports pain as 7/10 today.  He continues to have chronic knee pain.  He has failed knee steroid injections with Dr. Winters.  Also reports that he has had viscosupplementation in the past with limited pain relief.  He does not believe that he is a surgical candidate.    Interval History (7/17/2023):  Srinath Mcknight presents today for follow-up visit.  Patient was last seen on 5/11/2023. Pain in right knee has returned; it has only been about 3 months since viscosupplementation. Patient reports pain as 7/10 today.  He has been going to the gym.  He also reports right hand pain.  He saw Dr. Douglas in the past and had an injection, and the pain has returned.    Interval History (5/11/2023): Srinath Mcknight presents today for follow-up visit.  he underwent right knee Synvisc-One injection on 4/14/23 (1 month ago).  The patient reports that he is/was better following the procedure.  he reports 50% pain relief.     The changes have continued through this visit.  Patient reports pain as 8/10 today.  His main complaint today is right  groin pain.  He has never had any prior treatment of his right hip in the past.    Interval History (3/23/2023):  Srinath Mcknight presents today for follow-up visit.  Patient was last seen on 2/16/2023. At that visit, the plan was to start Lyrica, only talking QHS, which seems to be helping. Patient reports pain as 6/10 today.  His main complaint today is right knee pain.  He had a right knee steroid injection with Dr. Winters on 11/09/2022 with about 3-4 months pain relief.  This was the 1st treatment he has had for his right knee in the past.  Does not have any left knee pain.  Continues to do at home physical therapy exercises, which she feels is helping his back pain.  Overall, stable; pain is better controlled than it was at the last visit.    Initial HPI (2/16/2023):  Srinath Mcknight is a 82 y.o. male with past medical history significant for hypertension, hyperlipidemia, type 2 diabetes, coronary artery disease, urinary incontinence, sickle cell trait, stage 3 chronic kidney disease, gout, obstructive sleep apnea, history of L3-4 lumbar fusion and L4-5 lumbar laminectomy who presents to the clinic for the evaluation of lower back pain.  Of note patient reports 3 prior back surgeries:  The initial several years prior in the left lower back at [facility], and 2 lower back surgeries with Dr. Iqbal within the last 2 years.  Today patient reports pain which is constant which is rated an 8/5.  Pain is described as aching in nature.  Patient reports pain in a bandlike distribution in the lower back without radiation into the buttocks or lower extremities.  Patient does report chronic right-sided knee pain and history of total left hip arthroplasty.  Pain is exacerbated 1st thing in the morning when arising, with lumbar extension as well as with ambulation.  Patient reports he is able to ambulate only a few steps before requiring rest.  Pain is improved with lumbar support as well as sitting.  Patient  also reports receiving a lumbar epidural steroid injection at the back and spine [place] without any meaningful relief.  Patient has completed conventional physical therapy last year and continues physician directed physical therapy exercises at home without meaningful relief.  Patient has been taking gabapentin 600 mg once daily without improvement in his pain.  Patient does endorse weakness in the lower extremities associated with his pain.  Patient reports significant motor weakness.  Patient denies night fever/night sweats, urinary incontinence, bowel incontinence, significant weight loss, and loss of sensations.      Pain Disability Index Review:      1/8/2025     1:07 PM 11/15/2024     2:48 PM 11/6/2024     1:24 PM   Last 3 PDI Scores   Pain Disability Index (PDI) 35 47 49       Non-Pharmacologic Treatments:  Physical Therapy/Home Exercise: yes  Ice/Heat:no  TENS: no  Acupuncture: no  Massage: no  Chiropractic: no    Other: yes; sx x 3: [facility], Dr. Iqbal x 2      Pain Medications:  - Opioids: Percocet (Oxycodone/Acetaminophen)  - Adjuvant Medications: Clonazepam (Klonopin), Neurontin (Gabapentin), and Prednisone (Deltasone)    Relevant sx:  - 3 prior back surgeries:  1st [facility], and 2 lower back surgeries with Dr. Iqbal (March 2018: L3-L4 L5 decompression/laminotomy/micro dissection; August 2019: L3-4 revision with cage insertion and laminectomy/diskectomy)  - total left hip arthroplasty (total of 2 surgeries)      Pain Procedures:     Other providers:  -cervical medial branch facet injections in 2020 with Dr. Linares with subsequent RFA  -02/15/2018: Left-sided sacroiliac joint injection; Dr. Roldan  -01/24/2018: Left-sided L4/5 and L5/S1 transforaminal epidural steroid injection; Dr. Roldan  -06/14/2016: Left L3-4 facet joint injection; Dr. Hutchinson  -05/18/2016: Left hip intra-articular injection posterior approach; Dr. Madera    Orthopedics:  -right knee steroid injection with  "Dr. Winters on 11/09/2022 with about 3-4 months pain relief  -right wrist injection in May 2021 with Dr. Douglas       Review of Systems:  GENERAL:  No weight loss, malaise or fevers.  HEENT:   No recent changes in vision or hearing  NECK:  Negative for lumps, no difficulty with swallowing.  RESPIRATORY:  Negative for cough, wheezing or shortness of breath, patient denies any recent URI.  CARDIOVASCULAR:  Negative for chest pain or palpitations.  GI:  Negative for abdominal discomfort, blood in stools or black stools or change in bowel habits.  MUSCULOSKELETAL:  See HPI.  SKIN:  Negative for lesions, rash, and itching.  PSYCH:  No mood disorder or recent psychosocial stressors.   HEMATOLOGY/LYMPHOLOGY:  Negative for prolonged bleeding, bruising easily or swollen nodes.    NEURO:   No history of syncope, paralysis, seizures or tremors.  All other reviewed and negative other than HPI.        OBJECTIVE:    Physical Exam:  Vitals:    01/08/25 1306   BP: (!) 99/54   Pulse: 72   Resp: 17   Weight: 99.8 kg (220 lb 0.3 oz)   Height: 6' 3" (1.905 m)   PainSc:   5   PainLoc: Knee   Body mass index is 27.5 kg/m².   (reviewed on 1/8/2025)    GENERAL: Well appearing, in no acute distress, alert and oriented x3.  PSYCH:  Mood and affect appropriate.  SKIN: Skin color, texture, turgor normal, no rashes or lesions.  HEAD/FACE:  Normocephalic, atraumatic.    PULM: No evidence of respiratory difficulty, symmetric chest rise.  No audible wheezing   GI: no obvious distention.     BACK:  Positive shopping cart sign.  Well-healed 5 in lower lumbar vertical incision.  SLR is Equivocal to radicular pain on right. No pain to palpation over the facet joints of the lumbar spine or spinous processes.  Reduced range of motion with pain reproduction.  EXTREMITIES:  Peripheral joint range of motion is reduced with pain with instability noted bilateral lower extremities. No deformities, edema, or skin discoloration. Right wrist: TTP over medial " wrist.  MUSCULOSKELETAL: Able to stand on heels but not toes secondary to history of broken toes.     There is no pain with palpation over the sacroiliac joints bilaterally.  Gaenslen's, Distraction/Compression.  Pain with internal/external rotation of right hip.  Fabere's positive on the right.  Facet loading test is negative bilaterally.      Right Knee: pain with extension and flexion. TTP diffusely. Crepitus Present. No pain on left.    BUE & BLE strength is normal and symmetric.  No atrophy or tone abnormalities are noted.    RIGHT Lower extremity: Hip flexion 5/5, Hip Abduction 5/5, Hip Adduction 5/5, Knee extension 5/5, Knee flexion 5/5, Ankle dorsiflexion5/5, Extensor hallucis longus 5/5, Ankle plantarflexion 5/5  LEFT Lower extremity:  Hip flexion 5/5, Hip Abduction 5/5,Hip Adduction 5/5, Knee extension 5/5, Knee flexion 5/5, Ankle dorsiflexion 5/5, Extensor hallucis longus 5/5, Ankle plantarflexion 5/5  -Normal testing knee (patellar) jerk and ankle (achilles) jerk    NEURO: BUE & BLE coordination and muscle stretch reflexes are physiologic and symmetric. No loss of sensation is noted.  GAIT: antalgic gait, ambulates with cane.          Imaging/ Diagnostic Studies/ Labs (Reviewed on 1/8/2025):      11/13/2024 XR KNEE ORTHO RIGHT WITH FLEXION  COMPARISON: 01/02/2024   FINDINGS:  No significant joint space narrowing.  Mild DJD.  No fracture or dislocation.  No bone destruction identified    09/25/2024 X-Ray Hips Bilateral 2 View Inc AP Pelvis  FINDINGS:   No fracture identified.  Intact well-positioned left total hip arthroplasty with prominent nearly bridging heterotopic ossification laterally.  No acute complication.  Joint alignment is anatomic.  Moderate to severe right hip osteoarthritis with central joint collapse, marginal osteophytes and mild subchondral cystic change.    09/03/2024 MRI Lumbar Spine W WO Contrast   COMPARISON: Plain films from 11/14/2022    FINDINGS:  There is 1-2 mm of  anterolisthesis L3 on L4 within intradiscal spacer present at the L3-4 level.  Pedicle screws fixation rods also present bilaterally at the L3-4 level.  The vertebral body heights are well maintained, with no fracture.  No marrow signal abnormality suspicious for an infiltrative process.  No abnormal enhancement seen on the postcontrast images.    The conus medullaris terminates at approximately the superior endplate of L2.  The adjacent soft tissue structures show no significant abnormalities.  There is disc desiccation noted throughout the lumbar spine with moderate disc space narrowing seen at L5-S1 and more mild disc space narrowing seen at the remaining levels.    L1-L2: No significant central canal or neural foraminal narrowing.    L2-L3: Minimal diffuse disc bulge along with bilateral ligamentum flavum hypertrophy resulting in relatively mild narrowing of the central canal at L2-3.  No neural foraminal canal narrowing.    L3-L4: No significant central canal or neural foraminal narrowing. Postoperative changes seen at this level - L3-4.    L4-L5:  Diffuse disc bulge slightly more prominent in the right paracentral region along with moderate bilateral facet arthropathy resulting in relatively mild narrowing of the central canal at L4-5 and mild narrowing of either L4-5 neural foraminal canal right greater than the left.    L5-S1:  Mild-to-moderate bilateral facet arthropathy along with disc space narrowing seen at this level resulting in no significant central canal narrowing.  The bilateral L5-S1 neural foraminal canals appear to be mildly narrowed.  Impression:   1. Postoperative and mild degenerative changes of the lumbar spine as detailed above.  No high-grade central canal or neural foraminal canal narrowing suggested.      05/11/21 X-ray Knee Ortho Bilateral with Flexion  COMPARISON: December 14, 2017  FINDINGS:  Bilateral tricompartment degenerative changes.  There is increased narrowing medial  compartments.  No fracture, dislocation or effusions.  Soft tissues within normal limits.  Impression  Bilateral degenerative change without fracture or dislocation.      12/20/17 MRI Lumbar Spine W  WO Contrast  FINDINGS:  Since the previous study there has been a laminectomy at L4-L5. There is enhancement of granulation tissue at the operative site.  No abnormal enhancement surrounds the exiting nerve roots.  The thecal sac measures 9 mm AP at this level.  There is slight mass effect upon the lateral recess without definite compression of the descending L5 nerve roots. There is a synovial cyst with peripheral enhancement projecting centrally and inferiorly from the left facet joint at L3-L4 that demonstrates peripheral enhancement.  It causes mild spinal stenosis at the level of L4 and is slightly larger than on the previous study from May 2016. The conus medullaris terminates at the level ofL1-L2. No abnormal signal within the conus. Intervertebral disc levels are as follows:    T12-L1 disc: No significant disc pathology. No spinal canal or neural foraminal stenosis.    L1-L2 disc : No significant disc pathology. No spinal canal or neural foraminal stenosis.    L2-L3 disc: Normal disc height with mild degenerative facet changes and thickening of ligamentum flavum.  No significant canal or foraminal stenosis.    L3-L4 disc: Partial distal desiccation with a broad-based posterior disc bulge.  There is a far left lateral annular fissure of the disc.  Moderate to severe degenerative facet change with bilateral facet joint effusions.  The thecal sac measures 10 mm AP but is about 50% of normal cross-sectional area.  Disc material bulges into the floor of the left exit foramen and causes mild to moderate left foraminal stenosis.  There is minimal right foraminal stenosis.  Additionally, there is a synovial cyst projects centrally and inferiorly from the left facet joint.  The synovial cyst demonstrates peripheral  enhancement and measures 11 mm craniocaudal by 11 mm transverse by 5 mm AP.  It is best demonstrated on axial T2 series 6 image 23.  It causes mild thecal sac stenosis without definite neural compression.  There is a smaller synovial cyst projecting toward the left as well, remote from any neural structures.    L4-L5 disc: Level of interval laminectomy.  There is enhancing granulation tissue at the operative site without enhancement surrounding the exiting nerve roots.  The thecal sac measures 9 mm AP.  There is mild mass effect upon the lateral recesses without definite compression no descending L5 nerve roots.  Minimal bilateral foraminal stenosis.    L5-S1 disc: Partial does desiccation and disc space height loss.  Height loss is most severe toward the left where there are marginal osteophytes.  Osteophyte material encroaches into the floor of the left exit foramen causing moderate to severe left foraminal stenosis.  The right neural foramen is minimally narrowed.  Moderate degenerative facet change.  The thecal sac measures 12 mm AP.    IMPRESSION:  1.  There has been an interval laminectomy at L4-L5.  There is enhancing granulation tissue at the operative site.  There is diminished spinal stenosis at this level compared to the previous exam.    2.  Enhancement surrounds a synovial cyst that projects centrally and inferiorly from the left L3-L4 facet joint and causes mild spinal stenosis.  The cyst has increased in size compared to the previous examination.    3.  There is mild to moderate spinal stenosis at L3-L4 where the thecal sac is about 50% of normal cross-sectional area, predominantly related to hypertrophy of the posterior elements.    4.  There is a far left lateral annular fissure of L3-L4 disc.    5.  Moderate to severe left-sided foraminal stenosis at L5-S1.      11/14/22 X-Ray Lumbar Spine AP And Lateral  FINDINGS:  Prior posterior fusion of L3-L4 with interbody spacer.  Normal alignment.  No  evidence to suggest hardware failure.  Remaining lumbar vertebral bodies are normal in height and alignment.  There is mild to moderate multilevel degenerative disc disease most pronounced at L5-S1.  SI joints are intact.       11/18/19 X-Ray Cervical Spine AP And Lateral  FINDINGS:  There is visualization of C7-T1 disc level on the lateral view.  Interval progression multilevel degenerative changes throughout the C-spine.  Anterior and posterior marginal spurring with concomitant varying degrees of loss of disc height throughout the C-spine.  No acute fracture or subluxation.  C1-2 articulation appears within normal limits allowing for rotation.  Impression  Interval progression multilevel cervical spondylosis without acute fracture or subluxation.  Follow-up and/or further evaluation as warranted.        ASSESSMENT: 84 y.o. year old male with lower back pain, consistent with     1. Primary osteoarthritis of right hip        2. Primary osteoarthritis of right knee  Ambulatory referral/consult to Orthopedics      3. History of left hip replacement        4. History of lumbar fusion        5. History of lumbar laminectomy                PLAN:   - Interventions:     - S/p Right Intra-articular hip injection on 12/16/2024 with Dr. Merissa Kelly with some pain relief, maybe 75-80% pain relief.   - S/p right L4/5 + L5/S1 TF GAEL on 10/11/24 with limited pain relief.     - Anticoagulation use: no no anticoagulation    - Medications:  -Continue Lyrica (pregabalin) 150mg QHS.  We previously discussed potential side effects of this medication which may include drowsiness,dizziness, dry mouth, constipation or peripheral edema.     - Continue topical hemp seed oil topically for knee and back.  - Continue topical diclofenac 1% gel to apply 2g topically up to 4 times per day PRN.   - Continue topical lidocaine 2.5%-prilocaine 2.5% topical cream Q6H PRN topically; can alternate with Voltaren gel.       - JOHN ROSAS reviewed and  appropriate.       - Therapy: We have discussed continuing learned exercises learned at physical therapy to help manage the patient/s painful condition. The patient was previously counseled that muscle strengthening will improve the long term prognosis in regards to pain and may also help increase range of motion and mobility.  Patient has continued conventional land-based physical therapy from 05/03/2024, 07/03/2024, twice weekly sessions--13 sessions total.  Sciatic exercises given to patient to perform at home.  Patient previously declined formal physical therapy referral.    Diagnostic/ Imaging: No new imaging ordered. Previous imaging reviewed.   MRI Lumbar Spine W WO Contrast (09/03/2024) reviewed: Postoperative and mild degenerative changes of the lumbar spine as detailed above.  No high-grade central canal or neural foraminal canal narrowing suggested.     - Referrals:   - [place]. Jeff[place] ([place] [facility] [place]. Has upcoming appointment on 1/15/24.   - Also has been seen by our counterpart - Pain Mgmt in [place]:    - Continue follow-up with Lauren Leary PA-C/ Dr. Winters/ Dr. Schuler (Orthopedics): knee pain.  S/p right knee steroid in on 8/2/24. Will schedule follow-up; will place referral to Dr. Schuler.     -Continue follow-up with Dr. Douglas:  Extensor carpi ulnaris tendonitis.  -Continue follow-up with Dr. Wall (Orthopedics):      - Follow up visit: 3 months - discuss repeat hip injection if needed      Future Appointments   Date Time Provider Department Center   2/5/2025 11:20 AM Debo Nguyễn NP [facility] UROLOGY [facility]   4/9/2025 10:20 AM Leatha Franks PA-C [facility] INT [place]   4/15/2025  9:00 AM Placido Ugalde MD [facility] NEURO [place]   5/19/2025  9:40 AM [facility]uca [facility]     - Direct patient care provided: 18 minutes+. Over 50% of the time was spent counseling/educating the  patient. Total time spent on patient care including prep time reviewing the chart before the visit, time spent with patient during the visit, and the time spent after the visit reviewing studies, documenting note, obtaining information from collaborative providers, etc.: >40 minutes.     - Patient Questions: Answered all of the patient's questions regarding diagnosis, therapy, and treatment.    - This condition does not require this patient to take time off of work, and the primary goal of our Pain Management services is to improve the patient's functional capacity.   - I discussed the risks, benefits, and alternatives to potential treatment options. All questions and concerns were fully addressed today in clinic.         Leatha Franks PA-C  Interventional Pain Management - [facility]    Disclaimer:  This note was prepared using voice recognition system and is likely to have sound alike errors that may have been overlooked even after proof reading.  Please call me with any questions.

## 2025-01-08 NOTE — PROGRESS NOTES
Srinath Mcknight was seen 01/08/2025 for an audiological evaluation. Patient complains of bilateral gradual hearing loss and intermittent tinnitus. He is having difficulty understanding conversational speech and having to ask others to repeat often. He had hearing aids about 4 years ago but did not wear them consistently because they made his head hurt. He reports feeling lightheadedness upon rising and roomspinning dizziness often. He denies any history of noise exposure.    Results reveal a mild-to-severe sensorineural hearing loss 500-8000 Hz for the right ear, and  mild-to-severe sensorineural hearing loss 500-8000 Hz for the left ear.   Speech Reception Thresholds were  35 dBHL for the right ear and 35 dBHL for the left ear.   Word recognition scores were good for the right ear and excellent for the left ear.  Tympanograms were Type A, normal for the right ear and Type A, normal for the left ear.    Patient was counseled on the above findings.    Recommendations:  Follow-up with ENT, as scheduled.   Repeat audiological evaluation in 1-2 years to monitor hearing, or sooner if needed.  Consider a hearing aid consultation through [employer]. Patient provided with a copy of audiogram.   Wear hearing protection devices when around loud noise.

## 2025-01-08 NOTE — PATIENT INSTRUCTIONS
For knee pain:  Dr. Wall (Orthopedics) -- leaving [facility]     Dr. Schuler (Orthopedics) -- placing referral    Dr. Winters (Orthopedics) -- have seen in the past      Can do hip injection / back injection:  Dr. Brooks & Leatha Franks PA-C (interventional pain mgmt) - did back injection     Dr. Kelly (interventional pain mgmt) - did hip injection

## 2025-01-13 DIAGNOSIS — Z00.00 ENCOUNTER FOR MEDICARE ANNUAL WELLNESS EXAM: ICD-10-CM

## 2025-01-23 DIAGNOSIS — G47.61 PLMD (PERIODIC LIMB MOVEMENT DISORDER): Chronic | ICD-10-CM

## 2025-01-24 ENCOUNTER — OFFICE VISIT (OUTPATIENT)
Dept: INTERNAL MEDICINE | Facility: CLINIC | Age: 85
End: 2025-01-24
Payer: MEDICARE

## 2025-01-24 ENCOUNTER — LAB VISIT (OUTPATIENT)
Dept: LAB | Facility: HOSPITAL | Age: 85
End: 2025-01-24
Attending: NURSE PRACTITIONER
Payer: MEDICARE

## 2025-01-24 VITALS
HEIGHT: 75 IN | HEART RATE: 71 BPM | WEIGHT: 213.63 LBS | SYSTOLIC BLOOD PRESSURE: 134 MMHG | BODY MASS INDEX: 26.56 KG/M2 | TEMPERATURE: 97 F | OXYGEN SATURATION: 98 % | DIASTOLIC BLOOD PRESSURE: 70 MMHG

## 2025-01-24 DIAGNOSIS — R42 DIZZINESS: Primary | ICD-10-CM

## 2025-01-24 DIAGNOSIS — E11.59 HYPERTENSION ASSOCIATED WITH DIABETES: ICD-10-CM

## 2025-01-24 DIAGNOSIS — M47.26 OSTEOARTHRITIS OF SPINE WITH RADICULOPATHY, LUMBAR REGION: ICD-10-CM

## 2025-01-24 DIAGNOSIS — E11.69 COMBINED HYPERLIPIDEMIA ASSOCIATED WITH TYPE 2 DIABETES MELLITUS: ICD-10-CM

## 2025-01-24 DIAGNOSIS — E78.2 COMBINED HYPERLIPIDEMIA ASSOCIATED WITH TYPE 2 DIABETES MELLITUS: ICD-10-CM

## 2025-01-24 DIAGNOSIS — E11.3311 TYPE 2 DIABETES MELLITUS WITH MODERATE NONPROLIFERATIVE RETINOPATHY OF RIGHT EYE AND MACULAR EDEMA, UNSPECIFIED WHETHER LONG TERM INSULIN USE: ICD-10-CM

## 2025-01-24 DIAGNOSIS — E11.42 TYPE 2 DIABETES MELLITUS WITH DIABETIC POLYNEUROPATHY, WITHOUT LONG-TERM CURRENT USE OF INSULIN: ICD-10-CM

## 2025-01-24 DIAGNOSIS — R35.0 URINARY FREQUENCY: ICD-10-CM

## 2025-01-24 DIAGNOSIS — N18.32 STAGE 3B CHRONIC KIDNEY DISEASE: ICD-10-CM

## 2025-01-24 DIAGNOSIS — R42 DIZZINESS: ICD-10-CM

## 2025-01-24 DIAGNOSIS — R26.81 GAIT INSTABILITY: ICD-10-CM

## 2025-01-24 DIAGNOSIS — R26.89 POOR BALANCE: ICD-10-CM

## 2025-01-24 DIAGNOSIS — I15.2 HYPERTENSION ASSOCIATED WITH DIABETES: ICD-10-CM

## 2025-01-24 PROBLEM — N18.31 STAGE 3A CHRONIC KIDNEY DISEASE: Status: RESOLVED | Noted: 2024-04-25 | Resolved: 2025-01-24

## 2025-01-24 LAB
BILIRUB SERPL-MCNC: NORMAL MG/DL
BLOOD URINE, POC: NORMAL
CLARITY, POC UA: NORMAL
COLOR, POC UA: YELLOW
GLUCOSE UR QL STRIP: NORMAL
KETONES UR QL STRIP: NORMAL
LEUKOCYTE ESTERASE URINE, POC: NORMAL
Lab: NORMAL
Lab: NORMAL
NITRITE, POC UA: NORMAL
PH, POC UA: 6.5
PROTEIN, POC: 100
SPECIFIC GRAVITY, POC UA: 1
UROBILINOGEN, POC UA: 0.2

## 2025-01-24 PROCEDURE — 1159F MED LIST DOCD IN RCRD: CPT | Mod: HCNC,CPTII,S$GLB, | Performed by: NURSE PRACTITIONER

## 2025-01-24 PROCEDURE — 81002 URINALYSIS NONAUTO W/O SCOPE: CPT | Mod: HCNC,S$GLB,, | Performed by: NURSE PRACTITIONER

## 2025-01-24 PROCEDURE — 1100F PTFALLS ASSESS-DOCD GE2>/YR: CPT | Mod: HCNC,CPTII,S$GLB, | Performed by: NURSE PRACTITIONER

## 2025-01-24 PROCEDURE — 1160F RVW MEDS BY RX/DR IN RCRD: CPT | Mod: HCNC,CPTII,S$GLB, | Performed by: NURSE PRACTITIONER

## 2025-01-24 PROCEDURE — 80061 LIPID PANEL: CPT | Mod: HCNC | Performed by: NURSE PRACTITIONER

## 2025-01-24 PROCEDURE — 80053 COMPREHEN METABOLIC PANEL: CPT | Mod: HCNC | Performed by: NURSE PRACTITIONER

## 2025-01-24 PROCEDURE — 3288F FALL RISK ASSESSMENT DOCD: CPT | Mod: HCNC,CPTII,S$GLB, | Performed by: NURSE PRACTITIONER

## 2025-01-24 PROCEDURE — 82043 UR ALBUMIN QUANTITATIVE: CPT | Mod: HCNC | Performed by: NURSE PRACTITIONER

## 2025-01-24 PROCEDURE — G2211 COMPLEX E/M VISIT ADD ON: HCPCS | Mod: HCNC,S$GLB,, | Performed by: NURSE PRACTITIONER

## 2025-01-24 PROCEDURE — 83036 HEMOGLOBIN GLYCOSYLATED A1C: CPT | Mod: HCNC | Performed by: NURSE PRACTITIONER

## 2025-01-24 PROCEDURE — 99214 OFFICE O/P EST MOD 30 MIN: CPT | Mod: HCNC,S$GLB,, | Performed by: NURSE PRACTITIONER

## 2025-01-24 PROCEDURE — 84443 ASSAY THYROID STIM HORMONE: CPT | Mod: HCNC | Performed by: NURSE PRACTITIONER

## 2025-01-24 PROCEDURE — 3075F SYST BP GE 130 - 139MM HG: CPT | Mod: HCNC,CPTII,S$GLB, | Performed by: NURSE PRACTITIONER

## 2025-01-24 PROCEDURE — 85025 COMPLETE CBC W/AUTO DIFF WBC: CPT | Mod: HCNC | Performed by: NURSE PRACTITIONER

## 2025-01-24 PROCEDURE — 99999 PR PBB SHADOW E&M-EST. PATIENT-LVL V: CPT | Mod: PBBFAC,HCNC,, | Performed by: NURSE PRACTITIONER

## 2025-01-24 PROCEDURE — 36415 COLL VENOUS BLD VENIPUNCTURE: CPT | Mod: HCNC,PO | Performed by: NURSE PRACTITIONER

## 2025-01-24 PROCEDURE — 3078F DIAST BP <80 MM HG: CPT | Mod: HCNC,CPTII,S$GLB, | Performed by: NURSE PRACTITIONER

## 2025-01-24 RX ORDER — PRAMIPEXOLE DIHYDROCHLORIDE 0.12 MG/1
0.12 TABLET ORAL NIGHTLY
Qty: 90 TABLET | Refills: 0 | Status: SHIPPED | OUTPATIENT
Start: 2025-01-24

## 2025-01-24 RX ORDER — CEPHALEXIN 500 MG/1
500 CAPSULE ORAL EVERY 12 HOURS
Qty: 14 CAPSULE | Refills: 0 | Status: SHIPPED | OUTPATIENT
Start: 2025-01-24 | End: 2025-01-31

## 2025-01-24 NOTE — PROGRESS NOTES
Subjective:       Patient ID: Srinath Mcknight is a 84 y.o. male.    CHIEF COMPLAINT:  - Mr. Mcknight presents with complaints of dizziness, lightheadedness, urinary symptoms, and pain related to arthritis.    HPI:  Mr. Mcknight reports dizziness and lightheadedness, particularly when standing, which has been ongoing for an extended period. He needs to stand for a while before it subsides. He had two near-fall incidents last month but was able to stabilize himself, with one incident occurring in a supermarket where observers noticed his difficulty.    Mr. Mcknight complains of urinary problems, including frequent urination with at least 5-6 bathroom visits daily, sometimes twice within an hour. He has urinary urgency and dysuria approximately half the time. The urine is occasionally dark and cloudy. He is taking medication for his bladder, which he recently refilled, but reports limited efficacy. He follows with urology but feels this is a little different than his usual incontinence.     Mr. Mcknight reports sleep disturbances due to restless leg syndrome, causing nocturnal movements. His wife notes that he appears to have physical disturbances during sleep, while the patient perceives having had restful sleep.    Mr. Mcknight is dealing with arthritis pain. He is under Dr. Iqbal's care for back pain, with a workup planned. His knee pain is constant and unresponsive to Tylenol, described as a sharp, pinpoint sensation. He also has pain in his hip and thigh, for which he received an injection that provided minimal relief. He reports generalized pain from the waist down. Dr. Iqbal is considering a trial of a pain stimulator for back pain management.    Mr. Mcknight consulted Dr. Calvillo last year with similar complaints of dizziness. Diagnostic tests did not identify a clear etiology for his symptoms. He had previously been taking flomax and Gabapentin, which he suspected might have affected his balance, but he discontinued these  "medications and is still having similar, albeit less severe, symptoms.    Mr. Wylie diabetes is well-controlled with glucose levels mostly around 120 mg/dL over the past 6 months, with his morning reading being 112 mg/dL.        /70 (Patient Position: Sitting)   Pulse 71   Temp 96.8 °F (36 °C) (Tympanic)   Ht 6' 3" (1.905 m)   Wt 96.9 kg (213 lb 10 oz)   SpO2 98%   BMI 26.70 kg/m²     Review of Systems   Constitutional:  Negative for chills.   Gastrointestinal:  Positive for constipation. Negative for nausea and vomiting.   Genitourinary:  Positive for dysuria, flank pain, frequency and urgency. Negative for hematuria.   Skin:  Positive for rash.       Objective:      Physical Exam  Vitals and nursing note reviewed.   Constitutional:       General: He is not in acute distress.     Appearance: He is well-developed. He is not diaphoretic.   HENT:      Head: Normocephalic and atraumatic.   Cardiovascular:      Rate and Rhythm: Normal rate and regular rhythm.      Heart sounds: Normal heart sounds.   Pulmonary:      Effort: Pulmonary effort is normal. No respiratory distress.      Breath sounds: Normal breath sounds.   Musculoskeletal:      Thoracic back: Spasms present. Decreased range of motion.      Lumbar back: Spasms present. Decreased range of motion.      Right hip: No crepitus. Decreased range of motion. Decreased strength.      Right knee: Decreased range of motion.   Skin:     General: Skin is warm and dry.      Findings: No rash.   Neurological:      Mental Status: He is alert and oriented to person, place, and time.      Motor: Weakness present.      Comments: Walks with cane   Psychiatric:         Behavior: Behavior normal.         Thought Content: Thought content normal.         Judgment: Judgment normal.         Assessment and Plan        [place] was seen today for follow-up.    Diagnoses and all orders for this visit:    Dizziness  -     TSH; Future  -     Orthostatic vital signs    Poor " balance    Gait instability    Urinary frequency  -     POCT URINE DIPSTICK WITHOUT MICROSCOPE  -     cephALEXin (KEFLEX) 500 MG capsule; Take 1 capsule (500 mg total) by mouth every 12 (twelve) hours. 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      1. Dizziness    2. Poor balance    3. Gait instability    4. Urinary frequency    5. Hypertension associated with diabetes    6. 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 Combined hyperlipidemia associated with type 2 diabetes mellitus    8. Osteoarthritis of spine with radiculopathy, lumbar region    9. Stage 3b chronic kidney disease    10. BMI 26.0-26.9,adult        Assessment & Plan    TYPE 2 DIABETES MELLITUS WITH OTHER CIRCULATORY COMPLICATIONS:  - Assessed glucose control, which appears stable based on patient's report.  - Blood sugar levels have been around 120 in the last 6 months, with a reading of 112 this morning, which is within acceptable range.  - Ordered updated labs to check for any underlying causes of symptoms, to be performed before the patient leaves.  - Planned to check blood pressure multiple times to assess for drops that could cause dizziness.    DISEASE OF SPINAL CORD:  - Acknowledged Dr. Iqbal's plan for trial of  spinal cord stimulator for chronic pain management.  - Noted that major back surgery is being avoided due to patient's age and health status.  - Recognized ongoing back pain reported by the patient.    DIZZINESS AND ORTHOSTATIC HYPOTENSION:  - Evaluated for orthostatic hypotension as potential cause of lightheadedness upon standing. Orthostatics normal in clinic  - Mr. Mcknight experiences dizziness when getting up to walk, occurring most of the time when standing up, which improves when sitting down for a while.  - Considered urinary tract infection as a possible cause of dizziness.    FALL RISK:  - Noted patient reports falling twice last month and having close calls where they almost fell.  - Acknowledged patient's previous physical therapy for balance issues, which was not significantly helpful.    URINARY TRACT SYMPTOMS:  - Mr. Mcknight reports urinating at least 5-6 times daily, sometimes twice in 1 hour, with inability to hold urine.  - Burning sensation during urination occurs about half the time, and urine is sometimes dark and not clear.  - Continued medication for bladder, recently refilled.  - UA with leukocytes- will treat    SLEEP DISTURBANCES:  - Mr. Mcknight reports jumping up at night and restless leg symptoms.  - Observed patient reports moving a lot during sleep, as if fighting.    CHRONIC PAIN:  - Mr. Mckngiht reports persistent knee pain unresponsive to acetaminophen, hip pain treated with an injection, and pain from the waist down, including the right leg.     FOLLOW UP:  - keep follow up as scheduled with Dr. Wilder  - update labs today         This note was generated with the assistance of ambient listening technology. Verbal consent was obtained by the patient and accompanying visitor(s) for the recording of patient appointment to facilitate this note. I attest to having reviewed and edited the generated note for accuracy, though some syntax or spelling errors may persist. Please contact the author of  this note for any clarification.

## 2025-01-25 LAB
ALBUMIN SERPL BCP-MCNC: 3.5 G/DL (ref 3.5–5.2)
ALBUMIN/CREAT UR: 107.1 UG/MG (ref 0–30)
ALP SERPL-CCNC: 65 U/L (ref 40–150)
ALT SERPL W/O P-5'-P-CCNC: 18 U/L (ref 10–44)
ANION GAP SERPL CALC-SCNC: 13 MMOL/L (ref 8–16)
AST SERPL-CCNC: 22 U/L (ref 10–40)
BASOPHILS # BLD AUTO: 0.07 K/UL (ref 0–0.2)
BASOPHILS NFR BLD: 1.2 % (ref 0–1.9)
BILIRUB SERPL-MCNC: 0.4 MG/DL (ref 0.1–1)
BUN SERPL-MCNC: 17 MG/DL (ref 8–23)
CALCIUM SERPL-MCNC: 9.1 MG/DL (ref 8.7–10.5)
CHLORIDE SERPL-SCNC: 109 MMOL/L (ref 95–110)
CHOLEST SERPL-MCNC: 134 MG/DL (ref 120–199)
CHOLEST/HDLC SERPL: 4.6 {RATIO} (ref 2–5)
CO2 SERPL-SCNC: 22 MMOL/L (ref 23–29)
CREAT SERPL-MCNC: 1.6 MG/DL (ref 0.5–1.4)
CREAT UR-MCNC: 140 MG/DL (ref 23–375)
DIFFERENTIAL METHOD BLD: ABNORMAL
EOSINOPHIL # BLD AUTO: 0.4 K/UL (ref 0–0.5)
EOSINOPHIL NFR BLD: 7.1 % (ref 0–8)
ERYTHROCYTE [DISTWIDTH] IN BLOOD BY AUTOMATED COUNT: 14.5 % (ref 11.5–14.5)
EST. GFR  (NO RACE VARIABLE): 42.8 ML/MIN/1.73 M^2
ESTIMATED AVG GLUCOSE: 128 MG/DL (ref 68–131)
GLUCOSE SERPL-MCNC: 99 MG/DL (ref 70–110)
HBA1C MFR BLD: 6.1 % (ref 4–5.6)
HCT VFR BLD AUTO: 36.2 % (ref 40–54)
HDLC SERPL-MCNC: 29 MG/DL (ref 40–75)
HDLC SERPL: 21.6 % (ref 20–50)
HGB BLD-MCNC: 11.7 G/DL (ref 14–18)
IMM GRANULOCYTES # BLD AUTO: 0.01 K/UL (ref 0–0.04)
IMM GRANULOCYTES NFR BLD AUTO: 0.2 % (ref 0–0.5)
LDLC SERPL CALC-MCNC: 72.8 MG/DL (ref 63–159)
LYMPHOCYTES # BLD AUTO: 1.7 K/UL (ref 1–4.8)
LYMPHOCYTES NFR BLD: 28.9 % (ref 18–48)
MCH RBC QN AUTO: 29.2 PG (ref 27–31)
MCHC RBC AUTO-ENTMCNC: 32.3 G/DL (ref 32–36)
MCV RBC AUTO: 90 FL (ref 82–98)
MICROALBUMIN UR DL<=1MG/L-MCNC: 150 UG/ML
MONOCYTES # BLD AUTO: 0.5 K/UL (ref 0.3–1)
MONOCYTES NFR BLD: 8.5 % (ref 4–15)
NEUTROPHILS # BLD AUTO: 3.1 K/UL (ref 1.8–7.7)
NEUTROPHILS NFR BLD: 54.1 % (ref 38–73)
NONHDLC SERPL-MCNC: 105 MG/DL
NRBC BLD-RTO: 0 /100 WBC
PLATELET # BLD AUTO: 229 K/UL (ref 150–450)
PMV BLD AUTO: 11 FL (ref 9.2–12.9)
POTASSIUM SERPL-SCNC: 4.7 MMOL/L (ref 3.5–5.1)
PROT SERPL-MCNC: 6.9 G/DL (ref 6–8.4)
RBC # BLD AUTO: 4.01 M/UL (ref 4.6–6.2)
SODIUM SERPL-SCNC: 144 MMOL/L (ref 136–145)
TRIGL SERPL-MCNC: 161 MG/DL (ref 30–150)
TSH SERPL DL<=0.005 MIU/L-ACNC: 1.2 UIU/ML (ref 0.4–4)
WBC # BLD AUTO: 5.77 K/UL (ref 3.9–12.7)

## 2025-02-05 ENCOUNTER — OFFICE VISIT (OUTPATIENT)
Dept: UROLOGY | Facility: CLINIC | Age: 85
End: 2025-02-05
Payer: MEDICARE

## 2025-02-05 VITALS
WEIGHT: 217 LBS | BODY MASS INDEX: 26.98 KG/M2 | HEART RATE: 75 BPM | DIASTOLIC BLOOD PRESSURE: 61 MMHG | SYSTOLIC BLOOD PRESSURE: 101 MMHG | HEIGHT: 75 IN

## 2025-02-05 DIAGNOSIS — N32.81 OAB (OVERACTIVE BLADDER): ICD-10-CM

## 2025-02-05 DIAGNOSIS — N39.41 URGE INCONTINENCE OF URINE: Primary | ICD-10-CM

## 2025-02-05 LAB
BILIRUB UR QL STRIP: NEGATIVE
GLUCOSE UR QL STRIP: NEGATIVE
KETONES UR QL STRIP: NEGATIVE
LEUKOCYTE ESTERASE UR QL STRIP: POSITIVE
PH, POC UA: 6
POC BLOOD, URINE: NEGATIVE
POC NITRATES, URINE: NEGATIVE
POC RESIDUAL URINE VOLUME: 16 ML (ref 0–100)
PROT UR QL STRIP: NEGATIVE
SP GR UR STRIP: 1.01 (ref 1–1.03)
UROBILINOGEN UR STRIP-ACNC: 0.2 (ref 0.3–2.2)

## 2025-02-05 PROCEDURE — 3078F DIAST BP <80 MM HG: CPT | Mod: HCNC,CPTII,S$GLB, | Performed by: NURSE PRACTITIONER

## 2025-02-05 PROCEDURE — 3288F FALL RISK ASSESSMENT DOCD: CPT | Mod: HCNC,CPTII,S$GLB, | Performed by: NURSE PRACTITIONER

## 2025-02-05 PROCEDURE — 51798 US URINE CAPACITY MEASURE: CPT | Mod: HCNC,S$GLB,, | Performed by: NURSE PRACTITIONER

## 2025-02-05 PROCEDURE — 81003 URINALYSIS AUTO W/O SCOPE: CPT | Mod: QW,HCNC,S$GLB, | Performed by: NURSE PRACTITIONER

## 2025-02-05 PROCEDURE — 1100F PTFALLS ASSESS-DOCD GE2>/YR: CPT | Mod: HCNC,CPTII,S$GLB, | Performed by: NURSE PRACTITIONER

## 2025-02-05 PROCEDURE — 99999 PR PBB SHADOW E&M-EST. PATIENT-LVL V: CPT | Mod: PBBFAC,HCNC,, | Performed by: NURSE PRACTITIONER

## 2025-02-05 PROCEDURE — 3074F SYST BP LT 130 MM HG: CPT | Mod: HCNC,CPTII,S$GLB, | Performed by: NURSE PRACTITIONER

## 2025-02-05 PROCEDURE — 99214 OFFICE O/P EST MOD 30 MIN: CPT | Mod: HCNC,S$GLB,, | Performed by: NURSE PRACTITIONER

## 2025-02-05 PROCEDURE — 1159F MED LIST DOCD IN RCRD: CPT | Mod: HCNC,CPTII,S$GLB, | Performed by: NURSE PRACTITIONER

## 2025-02-05 PROCEDURE — 1125F AMNT PAIN NOTED PAIN PRSNT: CPT | Mod: HCNC,CPTII,S$GLB, | Performed by: NURSE PRACTITIONER

## 2025-02-05 NOTE — PROGRESS NOTES
Chief Complaint:   BPH with overactive bladder      HPI:   Patient is a 84-year-old male that is presenting as a follow-up to BPH with overactive bladder.  Was prescribed VESIcare and Uroxatral, reports a complete resolution to nocturia and daytime frequency.  Denies adverse side effects to medication.  Urine in clinic indicates small leukocytes, all other parameters are negative.  PVR was 16 mL.  11/05/2024  Patient is presenting as a follow-up to VESIcare.  Myrbetriq was discontinued secondary to high co-pay, patient was prescribed VESIcare.  States that nocturia has decreased to once nightly but still continues to have urinary urge and incontinence during the day.  Urine in clinic is negative and PVR is 67 mL.  Was also prescribed tamsulosin for a dual treatment, however, had lightheadedness with tamsulosin.  08/05/2024  Patient is a 83-year-old male that was prescribed Myrbetriq for nocturia.  Patient states that Myrbetriq resolved his urinary frequency and nocturia, however, can not afford the co-pay.  Urine in clinic is negative and PVR is 14 mL.  Is requesting information on treatment for erectile dysfunction.  06/03/2024  Patient is presenting as a follow-up to urgent care.  States that he was seen in urgent care secondary to urinary tract infection, positive urine culture indicated E coli.  Patient is uncircumcised.  Urine in clinic indicates trace leukocytes, all other parameters are negative.  PVR is 40 mL.  Reports that he return to urgent care with complaints of frequency and was prescribed Detrol 5 mg 3 times a day.  No BPH meds, tamsulosin made him feel lightheaded.  Reports that stream is strong, however, if he has not near a bathroom he will have urge incontinence.  Drinks very little water throughout the day.  11/13/2023  Patient 82-year-old male that is presenting as a follow-up for his annual exam.  Patient states that he was taken off tamsulosin secondary to feeling lightheaded.  Reports that  urinary stream is strong and nocturia is once nightly.  Urine in clinic is negative and PVR was 21 mL.  No gross hematuria.  No previous elevated PSAs or prostate biopsies.  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  Recent renal ultrasound indicated a stable, simple cyst.  11/09/2022  Patient is a 81-year-old male that is presenting for his annual exam.  Patient is currently on tamsulosin, reports all BPH symptoms are resolved.  Urine in clinic is negative.  PVR is 14 mL.  Recent PSA was normal, 0.77.  No past elevated PSAs or prostate biopsies.  Patient has erectile dysfunction and reports that TriMix is working well for him.  Patient has been followed for a right renal cyst.  Had renal ultrasound today, results are pending.  Denies gross hematuria or flank pain.  11/09/2021  Patient is a 80-year-old male that is presenting to review renal ultrasound.  Patient has a history of benign, simple renal cyst.  Renal ultrasound was stable, no change to right kidney upper pole cyst measuring 1.4 cm.  No hydronephrosis or nephrolithiasis.  Patient is due for PSA level.   Wants to discuss possible treatments for ED. No BPH meds. Nocturia once nightly, no daytime LUTS.  Allergies:  Sulfa (sulfonamide antibiotics)    Medications:  has a current medication list which includes the following prescription(s): alfuzosin, allopurinol, blood glucose control, low, true metrix air glucose meter, clonazepam, clotrimazole, cyanocobalamin (vitamin b-12), diclofenac sodium, fluticasone propionate, garlic extract, lactulose, levocetirizine, losartan, melatonin, metformin, metoprolol succinate, mometasone 0.1%, multivitamin, pramipexole, pravastatin, pregabalin, sildenafil, solifenacin, true metrix glucose test strip, and trueplus lancets.    Review of Systems:  General: No fever, chills, fatigability, or weight loss.  Skin: No rashes,  itching, or changes in color or texture of skin.  Chest: Denies DAMON, cyanosis, wheezing, cough, and sputum production.  Abdomen: Appetite fine. No weight loss. Denies diarrhea, abdominal pain, hematemesis, or blood in stool.  Musculoskeletal: No joint stiffness or swelling. Denies back pain.  : As above.  All other review of systems negative.    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    PSH:   has a past surgical history that includes Rotator cuff repair (Left, 2006); Shoulder arthroscopy w/ rotator cuff repair (Right, 2009); Laminectomy (07/22/2016); Hernia repair (Bilateral, 04/18/2017); Joint replacement (Left, 10/09/2017); Back surgery (2019); lumbar foraminotomy (03/2018); left elbow (10/2017); Revision total hip arthroplasty (Left, 9/11/2018); Esophagogastroduodenoscopy (N/A, 6/30/2020); Colonoscopy (N/A, 6/30/2020); Injection of joint (Right, 4/14/2023); Transforaminal epidural injection of steroid (Right, 10/11/2024); and Injection of joint (Right, 12/16/2024).    FamHx: family history includes Cancer (age of onset: 50) in his brother; Diabetes in his father, mother, and sister; Drug abuse in his brother; Heart disease in his father and mother; Hypertension in his father and mother; Stroke in his father.    SocHx:  reports that he has never smoked. He has never been exposed to  tobacco smoke. He has never used smokeless tobacco. He reports that he does not currently use alcohol. He reports that he does not use drugs.      Physical Exam:  General: A&Ox3, no apparent distress, no deformities  Neck: No masses, normal thyroid  Lungs: normal inspiration, no use of accessory muscles  Heart: normal pulse, no arrhythmias  Abdomen: Soft, NT, ND, no masses, no hernias, no hepatosplenomegaly  Lymphatic: Neck and groin nodes negative  Skin: The skin is warm and dry. No jaundice.  Ext: No c/c/e.      Labs/Studies:   See HPI  Impression/Plan:   BPH with urge incontinence  Patient to remain on dual medication, see HPI.  Return to clinic in 12 months for re-evaluation.

## 2025-02-14 DIAGNOSIS — I15.2 HYPERTENSION ASSOCIATED WITH DIABETES: ICD-10-CM

## 2025-02-14 DIAGNOSIS — E11.59 HYPERTENSION ASSOCIATED WITH DIABETES: ICD-10-CM

## 2025-02-14 RX ORDER — METFORMIN HYDROCHLORIDE 1000 MG/1
TABLET ORAL
Qty: 180 TABLET | Refills: 0 | Status: SHIPPED | OUTPATIENT
Start: 2025-02-14

## 2025-02-14 RX ORDER — LOSARTAN POTASSIUM 50 MG/1
50 TABLET ORAL
Qty: 90 TABLET | Refills: 0 | Status: SHIPPED | OUTPATIENT
Start: 2025-02-14

## 2025-02-14 NOTE — TELEPHONE ENCOUNTER
No care due was identified.  [facility] Embedded Care Due Messages. Reference number: [id number].   2/14/2025 4:28:29 AM CST

## 2025-02-14 NOTE — TELEPHONE ENCOUNTER
Refill Routing Note   Medication(s) are not appropriate for processing by [facility] for the following reason(s):        Required labs abnormal  Drug-disease interaction    [facility] action(s):  Defer      Medication Therapy Plan: Drug-Disease: metFORMIN and Diabetes mellitus with stage 3 chronic kidney disease; Stage 3b chronic kidney disease    Pharmacist review requested: Yes     Appointments  past 12m or future 3m with PCP    Date Provider   Last Visit   12/18/2023 Yeison Wilder MD   Next Visit   4/24/2025 Yeison Wilder MD   ED visits in past 90 days: 0        Note composed:11:24 AM 02/14/2025

## 2025-02-24 ENCOUNTER — TELEPHONE (OUTPATIENT)
Dept: ORTHOPEDICS | Facility: CLINIC | Age: 85
End: 2025-02-24
Payer: MEDICARE

## 2025-02-25 ENCOUNTER — TELEPHONE (OUTPATIENT)
Dept: ORTHOPEDICS | Facility: CLINIC | Age: 85
End: 2025-02-25
Payer: MEDICARE

## 2025-02-25 NOTE — TELEPHONE ENCOUNTER
I called the patient today regarding his voice message. I left a message for the patient to call me back. I left my name and phone number.        ----- Message from Elijah sent at 2/25/2025 12:10 PM CST -----  Regarding: appointment access  Contact: [phone]  Pt is calling to get scheduled with laura appointment . Radha contact pt with an appointment ,Pt says his back hops and knees all have pain going threw pt . Srinath [phone] contact pt

## 2025-02-26 ENCOUNTER — TELEPHONE (OUTPATIENT)
Dept: PAIN MEDICINE | Facility: CLINIC | Age: 85
End: 2025-02-26
Payer: MEDICARE

## 2025-02-26 NOTE — TELEPHONE ENCOUNTER
----- Message from Gale sent at 2/26/2025 12:59 PM CST -----  Regarding: pt advice  Contact: pt [phone]  Patient calling to advise injection did not work, would like to schedule appt . Pls call [phone]

## 2025-02-27 RX ORDER — METOPROLOL SUCCINATE 50 MG/1
50 TABLET, EXTENDED RELEASE ORAL
Qty: 90 TABLET | Refills: 0 | Status: SHIPPED | OUTPATIENT
Start: 2025-02-27

## 2025-02-27 NOTE — TELEPHONE ENCOUNTER
Refill Routing Note   Medication(s) are not appropriate for processing by [facility] for the following reason(s):        Drug-disease interaction    [facility] action(s):  Defer      Medication Therapy Plan: Hypertension associated with diabetes; Type 2 diabetes mellitus with diabetic peripheral angiopathy without gangrene, without long-term current use of insulin    Pharmacist review requested: Yes     Appointments  past 12m or future 3m with PCP    Date Provider   Last Visit   12/18/2023 Yeison Wilder MD   Next Visit   4/24/2025 Yeison Wilder MD   ED visits in past 90 days: 0        Note composed:7:28 AM 02/27/2025

## 2025-02-27 NOTE — TELEPHONE ENCOUNTER
No care due was identified.  [facility] Embedded Care Due Messages. Reference number: [id number].   2/27/2025 6:44:24 AM CST

## 2025-02-27 NOTE — TELEPHONE ENCOUNTER
Refill Decision Note   Srinath Mcknight  is requesting a refill authorization.  Brief Assessment and Rationale for Refill:  Approve     Medication Therapy Plan:         Pharmacist review requested: Yes   Extended chart review required: Yes   Comments:     Note composed:11:09 AM 02/27/2025

## 2025-03-09 ENCOUNTER — HOSPITAL ENCOUNTER (EMERGENCY)
Facility: HOSPITAL | Age: 85
Discharge: HOME | End: 2025-03-09
Attending: EMERGENCY MEDICINE
Payer: MEDICARE

## 2025-03-09 VITALS
WEIGHT: 210 LBS | OXYGEN SATURATION: 100 % | RESPIRATION RATE: 20 BRPM | HEART RATE: 60 BPM | HEIGHT: 75 IN | DIASTOLIC BLOOD PRESSURE: 91 MMHG | TEMPERATURE: 98 F | SYSTOLIC BLOOD PRESSURE: 199 MMHG | BODY MASS INDEX: 26.11 KG/M2

## 2025-03-09 DIAGNOSIS — K59.00 CONSTIPATION, UNSPECIFIED CONSTIPATION TYPE: ICD-10-CM

## 2025-03-09 DIAGNOSIS — G89.29 CHRONIC LOW BACK PAIN, UNSPECIFIED BACK PAIN LATERALITY, UNSPECIFIED WHETHER SCIATICA PRESENT: ICD-10-CM

## 2025-03-09 DIAGNOSIS — M54.50 CHRONIC LOW BACK PAIN, UNSPECIFIED BACK PAIN LATERALITY, UNSPECIFIED WHETHER SCIATICA PRESENT: ICD-10-CM

## 2025-03-09 DIAGNOSIS — R55 NEAR SYNCOPE: ICD-10-CM

## 2025-03-09 DIAGNOSIS — I10 UNCONTROLLED HYPERTENSION: ICD-10-CM

## 2025-03-09 DIAGNOSIS — R42 DIZZINESS: Primary | ICD-10-CM

## 2025-03-09 DIAGNOSIS — R06.09 DOE (DYSPNEA ON EXERTION): ICD-10-CM

## 2025-03-09 LAB
ALBUMIN SERPL BCP-MCNC: 3.5 G/DL (ref 3.5–5.2)
ALP SERPL-CCNC: 75 U/L (ref 40–150)
ALT SERPL W/O P-5'-P-CCNC: 21 U/L (ref 10–44)
ANION GAP SERPL CALC-SCNC: 10 MMOL/L (ref 8–16)
AST SERPL-CCNC: 25 U/L (ref 10–40)
BASOPHILS # BLD AUTO: 0.04 K/UL (ref 0–0.2)
BASOPHILS NFR BLD: 0.9 % (ref 0–1.9)
BILIRUB SERPL-MCNC: 0.4 MG/DL (ref 0.1–1)
BILIRUB UR QL STRIP: NEGATIVE
BIPAP: 0
BIPAP: 0
BNP SERPL-MCNC: 127 PG/ML (ref 0–99)
BUN SERPL-MCNC: 22 MG/DL (ref 8–23)
CALCIUM SERPL-MCNC: 8.8 MG/DL (ref 8.7–10.5)
CHLORIDE SERPL-SCNC: 112 MMOL/L (ref 95–110)
CLARITY UR REFRACT.AUTO: CLEAR
CO2 SERPL-SCNC: 21 MMOL/L (ref 23–29)
COLOR UR AUTO: YELLOW
CREAT SERPL-MCNC: 1.5 MG/DL (ref 0.5–1.4)
DIFFERENTIAL METHOD BLD: ABNORMAL
EOSINOPHIL # BLD AUTO: 0.4 K/UL (ref 0–0.5)
EOSINOPHIL NFR BLD: 8.7 % (ref 0–8)
ERYTHROCYTE [DISTWIDTH] IN BLOOD BY AUTOMATED COUNT: 14.6 % (ref 11.5–14.5)
EST. GFR  (NO RACE VARIABLE): 46.2 ML/MIN/1.73 M^2
FIO2: 21 %
FIO2: 21 %
GLUCOSE SERPL-MCNC: 104 MG/DL (ref 70–110)
GLUCOSE UR QL STRIP: NEGATIVE
HCT VFR BLD AUTO: 33.8 % (ref 40–54)
HCV AB SERPL QL IA: NORMAL
HGB BLD-MCNC: 11.3 G/DL (ref 14–18)
HGB UR QL STRIP: NEGATIVE
HIV 1+2 AB+HIV1 P24 AG SERPL QL IA: NORMAL
IMM GRANULOCYTES # BLD AUTO: 0.02 K/UL (ref 0–0.04)
IMM GRANULOCYTES NFR BLD AUTO: 0.5 % (ref 0–0.5)
INFLUENZA A, MOLECULAR: NEGATIVE
INFLUENZA B, MOLECULAR: NEGATIVE
KETONES UR QL STRIP: NEGATIVE
LDH SERPL L TO P-CCNC: 1.5 MMOL/L (ref 0.5–2.2)
LDH SERPL L TO P-CCNC: 3.2 MMOL/L
LEUKOCYTE ESTERASE UR QL STRIP: NEGATIVE
LYMPHOCYTES # BLD AUTO: 1.4 K/UL (ref 1–4.8)
LYMPHOCYTES NFR BLD: 32.1 % (ref 18–48)
MAGNESIUM SERPL-MCNC: 1.7 MG/DL (ref 1.6–2.6)
MCH RBC QN AUTO: 29.7 PG (ref 27–31)
MCHC RBC AUTO-ENTMCNC: 33.4 G/DL (ref 32–36)
MCV RBC AUTO: 89 FL (ref 82–98)
MONOCYTES # BLD AUTO: 0.3 K/UL (ref 0.3–1)
MONOCYTES NFR BLD: 6.8 % (ref 4–15)
NEUTROPHILS # BLD AUTO: 2.2 K/UL (ref 1.8–7.7)
NEUTROPHILS NFR BLD: 51 % (ref 38–73)
NITRITE UR QL STRIP: NEGATIVE
NRBC BLD-RTO: 0 /100 WBC
PH UR STRIP: 7 [PH] (ref 5–8)
PLATELET # BLD AUTO: 213 K/UL (ref 150–450)
PMV BLD AUTO: 10 FL (ref 9.2–12.9)
POC PERFORMED BY: NORMAL
POC PERFORMED BY: NORMAL
POC TEMPERATURE: 37 C
POC TEMPERATURE: 37 C
POTASSIUM SERPL-SCNC: 4.3 MMOL/L (ref 3.5–5.1)
PROCALCITONIN SERPL IA-MCNC: 0.02 NG/ML
PROT SERPL-MCNC: 6.8 G/DL (ref 6–8.4)
PROT UR QL STRIP: NEGATIVE
RBC # BLD AUTO: 3.8 M/UL (ref 4.6–6.2)
SARS-COV-2 RDRP RESP QL NAA+PROBE: NEGATIVE
SODIUM SERPL-SCNC: 143 MMOL/L (ref 136–145)
SP GR UR STRIP: 1.01 (ref 1–1.03)
SPECIMEN SOURCE: NORMAL
TROPONIN I SERPL DL<=0.01 NG/ML-MCNC: 3 NG/L (ref 0–35)
URN SPEC COLLECT METH UR: NORMAL
WBC # BLD AUTO: 4.24 K/UL (ref 3.9–12.7)

## 2025-03-09 PROCEDURE — 83880 ASSAY OF NATRIURETIC PEPTIDE: CPT | Mod: HCNC | Performed by: PHYSICIAN ASSISTANT

## 2025-03-09 PROCEDURE — 99900035 HC TECH TIME PER 15 MIN (STAT): Mod: HCNC

## 2025-03-09 PROCEDURE — 87040 BLOOD CULTURE FOR BACTERIA: CPT | Mod: HCNC | Performed by: PHYSICIAN ASSISTANT

## 2025-03-09 PROCEDURE — 99285 EMERGENCY DEPT VISIT HI MDM: CPT | Mod: 25,HCNC

## 2025-03-09 PROCEDURE — 84145 PROCALCITONIN (PCT): CPT | Mod: HCNC | Performed by: PHYSICIAN ASSISTANT

## 2025-03-09 PROCEDURE — 93005 ELECTROCARDIOGRAM TRACING: CPT | Mod: HCNC

## 2025-03-09 PROCEDURE — 83735 ASSAY OF MAGNESIUM: CPT | Mod: HCNC | Performed by: PHYSICIAN ASSISTANT

## 2025-03-09 PROCEDURE — 87389 HIV-1 AG W/HIV-1&-2 AB AG IA: CPT | Mod: HCNC | Performed by: PHYSICIAN ASSISTANT

## 2025-03-09 PROCEDURE — 84484 ASSAY OF TROPONIN QUANT: CPT | Mod: HCNC | Performed by: PHYSICIAN ASSISTANT

## 2025-03-09 PROCEDURE — 87635 SARS-COV-2 COVID-19 AMP PRB: CPT | Mod: HCNC | Performed by: PHYSICIAN ASSISTANT

## 2025-03-09 PROCEDURE — 83605 ASSAY OF LACTIC ACID: CPT | Mod: HCNC

## 2025-03-09 PROCEDURE — 85025 COMPLETE CBC W/AUTO DIFF WBC: CPT | Mod: HCNC | Performed by: PHYSICIAN ASSISTANT

## 2025-03-09 PROCEDURE — 25000003 PHARM REV CODE 250: Mod: HCNC | Performed by: PHYSICIAN ASSISTANT

## 2025-03-09 PROCEDURE — 87502 INFLUENZA DNA AMP PROBE: CPT | Mod: HCNC | Performed by: PHYSICIAN ASSISTANT

## 2025-03-09 PROCEDURE — 80053 COMPREHEN METABOLIC PANEL: CPT | Mod: HCNC | Performed by: PHYSICIAN ASSISTANT

## 2025-03-09 PROCEDURE — 81003 URINALYSIS AUTO W/O SCOPE: CPT | Mod: HCNC | Performed by: PHYSICIAN ASSISTANT

## 2025-03-09 PROCEDURE — 86803 HEPATITIS C AB TEST: CPT | Mod: HCNC | Performed by: PHYSICIAN ASSISTANT

## 2025-03-09 PROCEDURE — 96361 HYDRATE IV INFUSION ADD-ON: CPT | Mod: HCNC

## 2025-03-09 PROCEDURE — 96360 HYDRATION IV INFUSION INIT: CPT | Mod: HCNC

## 2025-03-09 PROCEDURE — 93010 ELECTROCARDIOGRAM REPORT: CPT | Mod: HCNC,,, | Performed by: INTERNAL MEDICINE

## 2025-03-09 RX ORDER — LIDOCAINE 50 MG/G
1 PATCH TOPICAL
Status: DISCONTINUED | OUTPATIENT
Start: 2025-03-09 | End: 2025-03-09 | Stop reason: HOSPADM

## 2025-03-09 RX ORDER — ACETAMINOPHEN 325 MG/1
650 TABLET ORAL
Status: COMPLETED | OUTPATIENT
Start: 2025-03-09 | End: 2025-03-09

## 2025-03-09 RX ORDER — LIDOCAINE 50 MG/G
1 PATCH TOPICAL DAILY PRN
Qty: 14 PATCH | Refills: 0 | Status: SHIPPED | OUTPATIENT
Start: 2025-03-09

## 2025-03-09 RX ORDER — LOSARTAN POTASSIUM 50 MG/1
50 TABLET ORAL
Status: COMPLETED | OUTPATIENT
Start: 2025-03-09 | End: 2025-03-09

## 2025-03-09 RX ADMIN — ACETAMINOPHEN 650 MG: 325 TABLET ORAL at 05:03

## 2025-03-09 RX ADMIN — LOSARTAN POTASSIUM 50 MG: 50 TABLET, FILM COATED ORAL at 05:03

## 2025-03-09 RX ADMIN — LIDOCAINE 1 PATCH: 50 PATCH CUTANEOUS at 05:03

## 2025-03-09 RX ADMIN — SODIUM CHLORIDE 500 ML: 9 INJECTION, SOLUTION INTRAVENOUS at 01:03

## 2025-03-09 NOTE — ED PROVIDER NOTES
"Encounter Date: 3/9/2025       History     Chief Complaint   Patient presents with    Shortness of Breath     SOB, uti, hospitalized last Saturday and discharged Monday. Electrolytes were low. Somewhat dizziness. "Couldn't walk too far"     The patient is an 84-year-old male.  He has a documented past medical history significant for hypertension, hyperlipidemia, diabetes, CKD, CAD, anemia, sleep apnea, neuropathy, chronic back pain, arthritis, gout, hearing aid dependence, ambulates with cane/walker.  He presents to the emergency room for an urgent evaluation with multiple complaints.  His chief complaint is dizziness/lightheadedness and unsteady gait.  He states for the past few weeks he has had increased lightheadedness, especially when going from a sitting to standing position.  He states that he does experience a spinning sensation and has to hold onto something to steady himself and prevent falling.  He states that he has had several falls this month because of this.  His wife contributes that he seems to have developed a change in his speech over the past 3-4 weeks.  He denies any headaches, or vision changes.  He reports chronic low back pain, but states that this is not new or worse, and denies any acute focal numbness or weakness. He denies any acute changes in bowel or bladder habits, but does report chronic constipation, manages with lactulose, last BM yesterday, not diarrhea, black or bloody. They live between [place] and [place] and report that he was seen at [facility] ER for these complaints last weekend and was admitted.  She states that during this admission they were told that his calcium and magnesium levels were low and that he had a urinary tract infection. She states that he was given supplements and also prescribed an antibiotic that he completed course yesterday, but does not recall the name of the medication.  He states that he is not experiencing any dysuria or difficulty " "urinating. He denies any fevers chills or sweats.  He does state that he does become short of breath with activity, but this is also long standing, not new or worse. He denies having any coughing or wheezing. He denies having any chest pain. They report that he was seen at an emergency room in [place] last night for all of this, and was told that his blood pressure was low, but was ultimately discharged. Pt and wife stating they feel that they were not given any further explanation or treatment plan for symptoms and are "worried that he may have had a stroke", prompting ED visit here for second opinion. He did not take his regular medications this morning.       Review of patient's allergies indicates:   Allergen Reactions    Sulfa (sulfonamide antibiotics)      Can't recall from 1995     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    Trouble in sleeping     Type 2 diabetes mellitus with diabetic polyneuropathy, without long-term current use of insulin     Urinary incontinence     Uses hearing aid     bilat     Past Surgical History:   Procedure Laterality Date    BACK SURGERY  2019    COLONOSCOPY N/A 6/30/2020    Procedure: COLONOSCOPY;  Surgeon: Joseph Iraheta MD;  Location: [facility];  Service: Endoscopy;  Laterality: N/A;    ESOPHAGOGASTRODUODENOSCOPY N/A 6/30/2020    Procedure: EGD (ESOPHAGOGASTRODUODENOSCOPY);  Surgeon: " Joseph Iraheta MD;  Location: [facility] ENDO;  Service: Endoscopy;  Laterality: N/A;    HERNIA REPAIR Bilateral 04/18/2017    INJECTION OF JOINT Right 4/14/2023    Procedure: right Synvisc Knee injection;  Surgeon: Hossein Brooks MD;  Location: [facility] PAIN MGT;  Service: Pain Management;  Laterality: Right;    INJECTION OF JOINT Right 12/16/2024    Procedure: RT Intra-articular hip injection;  Surgeon: Merissa Kelly DO;  Location: [facility] PAIN MANAGEMENT;  Service: Pain Management;  Laterality: Right;  oral sed-no ac    JOINT REPLACEMENT Left 10/09/2017    L YAHIR Dr. Alcocer    LAMINECTOMY  07/22/2016    left elbow  10/2017    procedure to remove gout    lumbar foraminotomy  03/2018    REVISION TOTAL HIP ARTHROPLASTY Left 9/11/2018    Procedure: REVISION, TOTAL ARTHROPLASTY, HIP;  Surgeon: Albaro Alcocer Sr., MD;  Location: [facility] OR;  Service: Orthopedics;  Laterality: Left;    ROTATOR CUFF REPAIR Left 2006    SHOULDER ARTHROSCOPY W/ ROTATOR CUFF REPAIR Right 2009    TRANSFORAMINAL EPIDURAL INJECTION OF STEROID Right 10/11/2024    Procedure: Right L4/5 + L5/S1 TF GAEL;  Surgeon: Hossein Brooks MD;  Location: [facility] PAIN MGT;  Service: Pain Management;  Laterality: Right;     Family History   Problem Relation Name Age of Onset    Hypertension Mother      Heart disease Mother      Diabetes Mother      Hypertension Father      Heart disease Father      Diabetes Father      Stroke Father      Diabetes Sister      Cancer Brother  50        pancreas    Drug abuse Brother      Prostate cancer Neg Hx      Macular degeneration Neg Hx      Retinal detachment Neg Hx      Strabismus Neg Hx      Glaucoma Neg Hx      Blindness Neg Hx      Amblyopia Neg Hx      Kidney disease Neg Hx      Mental illness Neg Hx      Intellectual disability Neg Hx      COPD Neg Hx      Asthma Neg Hx       Social History[1]  Review of Systems   Constitutional:  Positive for fatigue. Negative for appetite change, chills, diaphoresis and fever.   HENT:   Negative for congestion, rhinorrhea and sore throat.    Eyes:  Negative for pain and visual disturbance.   Respiratory:  Negative for cough, chest tightness and wheezing.         (+) DAMON, chronic   Cardiovascular:  Negative for chest pain and palpitations.   Gastrointestinal:  Positive for constipation. Negative for abdominal distention, abdominal pain, blood in stool, diarrhea, nausea and vomiting.   Genitourinary:  Negative for decreased urine volume, difficulty urinating, dysuria, flank pain and hematuria.   Musculoskeletal:  Negative for myalgias and neck pain.        (+) Low back pain, chronic    Skin:  Negative for rash and wound.   Allergic/Immunologic: Negative for immunocompromised state.   Neurological:  Positive for dizziness and light-headedness. Negative for tremors, seizures, syncope, facial asymmetry, weakness, numbness and headaches.   Psychiatric/Behavioral:  Negative for confusion.        Physical Exam     Initial Vitals [03/09/25 0947]   BP Pulse Resp Temp SpO2   (!) 111/58 77 (!) 23 98.2 °F (36.8 °C) 98 %      MAP       --         Physical Exam    Nursing note and vitals reviewed.  Constitutional: He appears well-developed and well-nourished. He is not diaphoretic.   Alert and interactive.    HENT:   Head: Normocephalic and atraumatic. Mouth/Throat: Oropharynx is clear and moist.   Hearing Aids    Eyes: Conjunctivae are normal.   Glasses    Neck: Neck supple.   Cardiovascular:  Normal rate and intact distal pulses.           Pulmonary/Chest: No respiratory distress. He has no wheezes.   Not coughing or wheezing. No increased work of breathing noted. Resting SaO2 98% on RA    Abdominal: Abdomen is soft. He exhibits no distension. There is no abdominal tenderness. There is no rebound and no guarding.   Musculoskeletal:         General: Normal range of motion.      Cervical back: Neck supple.     Neurological: He is alert and oriented to person, place, and time.   No facial droop. Wife reports  ""speech sounds different for the past 3-4 weeks", no obvious dysarthria or expressive aphasia appreciated. Unsteady gait at baseline, uses cane and walker for ambulation. No focal weakness. Sensation intact. Oriented appropriately.    Skin: Skin is warm and dry.   Psychiatric: He has a normal mood and affect. His behavior is normal.         ED Course   Procedures  Labs Reviewed   B-TYPE NATRIURETIC PEPTIDE - Abnormal       Result Value     (*)    CBC W/ AUTO DIFFERENTIAL - Abnormal    WBC 4.24      RBC 3.80 (*)     Hemoglobin 11.3 (*)     Hematocrit 33.8 (*)     MCV 89      MCH 29.7      MCHC 33.4      RDW 14.6 (*)     Platelets 213      MPV 10.0      Immature Granulocytes 0.5      Gran # (ANC) 2.2      Immature Grans (Abs) 0.02      Lymph # 1.4      Mono # 0.3      Eos # 0.4      Baso # 0.04      nRBC 0      Gran % 51.0      Lymph % 32.1      Mono % 6.8      Eosinophil % 8.7 (*)     Basophil % 0.9      Differential Method Automated     COMPREHENSIVE METABOLIC PANEL - Abnormal    Sodium 143      Potassium 4.3      Chloride 112 (*)     CO2 21 (*)     Glucose 104      BUN 22      Creatinine 1.5 (*)     Calcium 8.8      Total Protein 6.8      Albumin 3.5      Total Bilirubin 0.4      Alkaline Phosphatase 75      AST 25      ALT 21      eGFR 46.2 (*)     Anion Gap 10     CULTURE, BLOOD    Blood Culture, Routine No Growth to date     CULTURE, BLOOD    Blood Culture, Routine No Growth to date     INFLUENZA A & B BY MOLECULAR    Influenza A, Molecular Negative      Influenza B, Molecular Negative      Flu A & B Source NP     HEPATITIS C ANTIBODY    Hepatitis C Ab Non-reactive      Narrative:     Release to patient->Immediate   HIV 1 / 2 ANTIBODY    HIV 1/2 Ag/Ab Non-reactive      Narrative:     Release to patient->Immediate   TROPONIN I HIGH SENSITIVITY    Troponin I High Sensitivity 3     URINALYSIS, REFLEX TO URINE CULTURE    Specimen UA Urine, Clean Catch      Color, UA Yellow      Appearance, UA Clear      pH, " UA 7.0      Specific Gravity, UA 1.015      Protein, UA Negative      Glucose, UA Negative      Ketones, UA Negative      Bilirubin (UA) Negative      Occult Blood UA Negative      Nitrite, UA Negative      Leukocytes, UA Negative      Narrative:     Specimen Source->Urine   MAGNESIUM    Magnesium 1.7     PROCALCITONIN    Procalcitonin 0.02     SARS-COV-2 RNA AMPLIFICATION, QUAL    SARS-CoV-2 RNA, Amplification, Qual Negative       EKG Readings: (Independently Interpreted)   Initial Reading: No STEMI. Previous EKG: Compared with most recent EKG Rhythm: Normal Sinus Rhythm. Heart Rate: 77. ST Segment Depression: V3, V4, V5 and V6.   Reviewed and signed by the ER attending physician - no STEMI        Imaging Results              MRI Brain Without Contrast (Final result)  Result time 03/09/25 16:23:29      Final result by Emmanuel Perera MD (03/09/25 16:23:29)                   Impression:      No acute intracranial process.  The brain parenchyma including the cerebellum maintains normal signal intensity.      Electronically signed by: Emmanuel Perera  Date:    03/09/2025  Time:    16:23               Narrative:    EXAMINATION:  MRI BRAIN WITHOUT CONTRAST    CLINICAL HISTORY:  Dizziness, persistent/recurrent, cardiac or vascular cause suspected;    TECHNIQUE:  Multiplanar multisequence MR imaging of the brain was performed without contrast.    COMPARISON:  None.    FINDINGS:  No evidence of hydrocephalus, mass effect, intracranial hemorrhage or acute infarct.    The brain parenchyma maintains normal signal intensity.    Normal arterial flow voids are preserved at the skull base.    Chronic right frontal sinus opacification.  The mastoid air cells are clear.                                       CT Head Without Contrast (Final result)  Result time 03/09/25 11:42:21      Final result by Emmanuel Perera MD (03/09/25 11:42:21)                   Impression:      No acute intracranial process.      Electronically  signed by: Emmanuel Perera  Date:    03/09/2025  Time:    11:42               Narrative:    EXAMINATION:  CT HEAD WITHOUT CONTRAST    CLINICAL HISTORY:  Dizziness, persistent/recurrent, cardiac or vascular cause suspected;    TECHNIQUE:  Low dose axial images were obtained through the head.  Coronal and sagittal reformations were also performed. Contrast was not administered.    COMPARISON:  11/18/2019, 03/12/2006    FINDINGS:  Element of volume loss.    No evidence of hydrocephalus, mass effect, intracranial hemorrhage or acute territorial infarct.    The brain parenchyma including the cerebellum maintains normal attenuation    No acute calvarial fracture.  Chronic right frontal sinus opacification.  The mastoid air cells are clear.                                       X-Ray Chest AP Portable (Final result)  Result time 03/09/25 11:52:07      Final result by Kathryn Skaggs MD (03/09/25 11:52:07)                   Impression:      Clear lungs.      Electronically signed by: Kathryn Skaggs MD  Date:    03/09/2025  Time:    11:52               Narrative:    EXAMINATION:  XR CHEST AP PORTABLE    CLINICAL HISTORY:  Shortness of breath    TECHNIQUE:  Single frontal view of the chest was performed.    COMPARISON:  Prior dated 07/13/2022    FINDINGS:  The mediastinal structures are midline.  The cardiac silhouette is not enlarged.  The lungs are clear.    No significant osseous abnormalities are seen.                                       Medications   sodium chloride 0.9% bolus 500 mL 500 mL (0 mLs Intravenous Stopped 3/9/25 1506)   acetaminophen tablet 650 mg (650 mg Oral Given 3/9/25 1705)   losartan tablet 50 mg (50 mg Oral Given 3/9/25 1752)     Vitals:    03/09/25 1300 03/09/25 1523 03/09/25 1705 03/09/25 1752   BP:  (!) 186/79  (!) 199/91   BP Location:  Right arm  Right arm   Patient Position:  Lying  Lying   Pulse: 63 60  60   Resp: 14 17  20   Temp:   97.9 °F (36.6 °C)    TempSrc:       SpO2: 100% 100%   "100%   Weight:       Height:           Medical Decision Making  Amount and/or Complexity of Data Reviewed  Labs: ordered.  Radiology: ordered.    Risk  OTC drugs.  Prescription drug management.      Additional MDM:     EKG: I have independently interpreted EKG(s) - see notes. NSR, normal rate, T wave inversion V3-V6     X-Rays: I have independently interpreted X-Ray(s) - see notes.           ED Course as of 03/09/25 2241   Sun Mar 09, 2025   0956 EKG 12-lead  No STEMI, repeat in 15 minutes due to TWI in lateral leads (although present previously) [AC]      ED Course User Index  [AC] Luc Chen, DO               Medical Decision Making:   History:   I obtained history from: someone other than patient.       <> Summary of History: History obtained from patient and from patient's wife  Initial Assessment:   84-year-old male, history of HTN, DM, CKD, CAD, chronic back pain, presenting to the ER with multiple complaints.  Patient with chief complaint of lightheadedness and dizziness, worse with position change, for several weeks.  Additionally wife reports possible speech change for the past 3-4 weeks.  Was admitted last weekend at [facility] for this and was treated for low calcium, low magnesium, and a UTI.  Seen again at outside ER last night for same and was told symptoms due to low blood pressure. Here for second opinion, expresses concern for possible stroke.   Differential Diagnosis:   CVA, posterior stroke, orthostatic hypotension, vertigo, uncontrolled blood pressure, infection, anemia, electrolyte derangement, dysrhythmia, ACS, medication effect, etc.  Clinical Tests:   Lab Tests: Ordered and Reviewed  Radiological Study: Ordered and Reviewed  Medical Tests: Ordered and Reviewed  Sepsis Perfusion Assessment: "I attest a sepsis perfusion exam was performed within 6 hours of sepsis, severe sepsis, or septic shock presentation, following fluid resuscitation."  ED Management:  Vital signs reviewed, " afebrile, no tachycardia, soft BP, no hypoxia  Previous records reviewed, however records from recent admission and outside ER visit from this week unavailable  EKG completed, normal sinus rhythm, normal rate, T-wave inversion to lateral leads; no STEMI per attending review/interpretation  Orthostatic vital signs completed   Chest x-ray unremarkable   Head CT no acute findings   Respiratory therapist notified that point of care lactate elevated at 3; workup expanded to include blood cultures, procalcitonin, and serial lactate  Gentle IV fluid bolus given due to elevated Lactate and Soft BP - judiciously hydrated due to advanced age and cardiac history   MRI brain completed to r/o cerebellar stroke - negative   Repeat Lactate in normal range   CBC, CMP unremarkable   UA unremarkable   Procal negative   HS Trop and BNP negative   Serial blood pressure reading progressively increasing throughout ED stay. 110/50 on arrival --> 200/91. Pt did not take BP meds today. He is prescribed Losartan 50 and metoprolol 50. Will give Losartan in ED.   Lengthy discussion held after work up regarding results. Expressed relief regarding neg CT/MRI. Dizziness/lightheadedness seems positional, but sounds like may be due to low BP more so than vertigo. Episodes seem to happen in morning after BP meds, may need to reduce meds. Shared decision making regarding disposition, considered/offered admission, however, currently patient is not dizzy with standing and BP is notable elevated after not taking meds today; states that he has an appointment scheduled with his PCP on Tuesday and prefers discharge. Advised to monitor BP closely at home and record daily log to take and review with PCP. Advised fall precautions and gave detailed instructions regarding standing up to quickly from seated position, wife agrees to supervise assist him to prevent falls. If BP readings are low after AM meds, will call PCP and consider holding BBlocker.   Patient  now is also asking about back pain medication. States that he was prescribed gabapentin, but stopped taking it because feels it given him RLS and interrupts his sleep, has only been taking Tylenol. Will try Lidoderm patches and also discuss with PCP.   Return precautions advised. Verbalized understanding and comfort with plan.   Other:   I have discussed this case with another health care provider.  Medical complexity: Moderate risk             Clinical Impression:  Final diagnoses:  [R42] Dizziness (Primary)  [K59.00] Constipation, unspecified constipation type  [I10] Uncontrolled hypertension  [M54.50, G89.29] Chronic low back pain, unspecified back pain laterality, unspecified whether sciatica present  [R06.09] DAMON (dyspnea on exertion)          ED Disposition Condition    Discharge Stable          ED Prescriptions       Medication Sig Dispense Start Date End Date Auth. Provider    LIDOcaine (LIDODERM) 5 % Place 1 patch onto the skin daily as needed (pain). Remove & Discard patch within 12 hours or as directed by MD 14 patch 3/9/2025 -- Jayson Mcarthur PA-C          Follow-up Information       Follow up With Specialties Details Why Contact Info    Yeison Wilder MD Internal Medicine Schedule an appointment as soon as possible for a visit  Follow up with your primary care physician next week as scheduled.Take blood pressure medication every day as prescribed. Keep record of daily BP readings and take to PCP. Fall precautions as discussed. [id number] [facility] [id number] [phone]      [facility] - Emergency Dept Emergency Medicine  Return to the ER if worse in any way. [address] [facility] [id number] [phone]               Jayson Mcarthur PA-C  03/09/25 5081         [1]   Social History  Tobacco Use    Smoking status: Never     Passive exposure: Never    Smokeless tobacco: Never   Substance Use Topics    Alcohol use: Not Currently    Drug use: No         Jayson Mcarthur, LAUREN  03/09/25 7076

## 2025-03-09 NOTE — ED TRIAGE NOTES
"Srinath Mcknight, a 84 y.o. male presents to the ED via pv w/ complaint of sob w/exertion, dizziness. -cp -n/v/d -fever/chills, recenttx for uti. Pmhx dm    Triage note:  Chief Complaint   Patient presents with    Shortness of Breath     SOB, uti, hospitalized last Saturday and discharged Monday. Electrolytes were low. Somewhat dizziness. "Couldn't walk too far"     Review of patient's allergies indicates:   Allergen Reactions    Sulfa (sulfonamide antibiotics)      Can't recall from 1995       "

## 2025-03-09 NOTE — FIRST PROVIDER EVALUATION
" Emergency Department TeleTriage Encounter Note      CHIEF COMPLAINT    Chief Complaint   Patient presents with    Shortness of Breath     SOB, uti, hospitalized last Saturday and discharged Monday. Electrolytes were low. Somewhat dizziness. "Couldn't walk too far"       VITAL SIGNS   Initial Vitals [03/09/25 0947]   BP Pulse Resp Temp SpO2   (!) 111/58 77 (!) 23 98.2 °F (36.8 °C) 98 %      MAP       --            ALLERGIES    Review of patient's allergies indicates:   Allergen Reactions    Sulfa (sulfonamide antibiotics)      Can't recall from 1995       PROVIDER TRIAGE NOTE  Patient presents with SOB, near-syncope when standing, lightheaded. Recent hospitalization for sepsis and UTI. Denies chest pain.       ORDERS  Labs Reviewed   HEPATITIS C ANTIBODY   HIV 1 / 2 ANTIBODY       ED Orders (720h ago, onward)      Start Ordered     Status Ordering Provider    03/09/25 0949 03/09/25 0948  Hepatitis C Antibody  STAT         Ordered ROCÍO FITCH    03/09/25 0949 03/09/25 0948  HIV 1/2 Ag/Ab (4th Gen)  STAT         Ordered ROCÍO FITCH    03/09/25 0949 03/09/25 0948  EKG 12-lead  Once         Completed by JACINTO WILLSON on 3/9/2025 at  9:54 AM HANNAH DUFF              Virtual Visit Note: The provider triage portion of this emergency department evaluation and documentation was performed via [employer], a HIPAA-compliant telemedicine application, in concert with a tele-presenter in the room. A face to face patient evaluation with one of my colleagues will occur once the patient is placed in an emergency department room.      DISCLAIMER: This note was prepared with [employer] voice recognition transcription software. Garbled syntax, mangled pronouns, and other bizarre constructions may be attributed to that software system.    "

## 2025-03-10 LAB
OHS QRS DURATION: 90 MS
OHS QRS DURATION: 96 MS
OHS QTC CALCULATION: 427 MS
OHS QTC CALCULATION: 434 MS

## 2025-03-12 ENCOUNTER — OFFICE VISIT (OUTPATIENT)
Dept: INTERNAL MEDICINE | Facility: CLINIC | Age: 85
End: 2025-03-12
Payer: MEDICARE

## 2025-03-12 VITALS
OXYGEN SATURATION: 99 % | HEART RATE: 79 BPM | TEMPERATURE: 97 F | DIASTOLIC BLOOD PRESSURE: 54 MMHG | SYSTOLIC BLOOD PRESSURE: 93 MMHG | WEIGHT: 205.69 LBS | BODY MASS INDEX: 25.71 KG/M2

## 2025-03-12 DIAGNOSIS — E11.69 COMBINED HYPERLIPIDEMIA ASSOCIATED WITH TYPE 2 DIABETES MELLITUS: Chronic | ICD-10-CM

## 2025-03-12 DIAGNOSIS — I95.9 HYPOTENSION, UNSPECIFIED HYPOTENSION TYPE: Primary | ICD-10-CM

## 2025-03-12 DIAGNOSIS — E78.2 COMBINED HYPERLIPIDEMIA ASSOCIATED WITH TYPE 2 DIABETES MELLITUS: Chronic | ICD-10-CM

## 2025-03-12 DIAGNOSIS — E11.3311 TYPE 2 DIABETES MELLITUS WITH MODERATE NONPROLIFERATIVE RETINOPATHY OF RIGHT EYE AND MACULAR EDEMA, UNSPECIFIED WHETHER LONG TERM INSULIN USE: ICD-10-CM

## 2025-03-12 PROCEDURE — 99999 PR PBB SHADOW E&M-EST. PATIENT-LVL V: CPT | Mod: PBBFAC,HCNC,, | Performed by: INTERNAL MEDICINE

## 2025-03-12 RX ORDER — CEPHALEXIN 500 MG/1
500 CAPSULE ORAL 2 TIMES DAILY
COMMUNITY
Start: 2025-03-03 | End: 2025-03-12

## 2025-03-12 NOTE — PROGRESS NOTES
Subjective:       Patient ID: Srinath Mcknight is a 84 y.o. male.    Chief Complaint: Hospital Follow Up      HPI:  History of Present Illness    Patient presents today for follow up of blood pressure concerns.    He reports experiencing dizziness and feeling cold upon standing, which he attributes to blood pressure fluctuations possibly caused by his blood pressure medication. He has had episodes of low blood pressure with concerns about passing out or falling.    He discontinued Losartan since hospital discharge on Sunday. He continues Metoprolol as prescribed. He has not taken Klonopin for approximately 2 weeks, Lyrica for almost 1 week, and Mirapex for restless leg syndrome for almost 1 week.    He was seen in the ER on Sunday where labs, CT, and MRI were performed, all of which were normal.    He reports leg pain and nocturnal symptoms including jumping movements and vocalizations. This happens if he doesn't take his mirapex.    His blood sugar have been well-controlled, ranging between  mg/dL, with an average of 100 mg/dL.    He maintains good appetite and drinks 3-4 bottles of water daily.         Review of Systems   Constitutional:  Negative for fever and unexpected weight change.   HENT:  Negative for hearing loss, postnasal drip and rhinorrhea.    Eyes:  Negative for pain and visual disturbance.   Respiratory:  Negative for cough, shortness of breath and wheezing.    Cardiovascular:  Negative for chest pain and palpitations.   Gastrointestinal:  Negative for constipation, diarrhea, nausea and vomiting.   Genitourinary:  Negative for dysuria and hematuria.   Musculoskeletal:  Negative for arthralgias, back pain, myalgias and neck stiffness.   Skin:  Negative for pallor and rash.   Neurological:  Negative for seizures, syncope and headaches.   Hematological:  Negative for adenopathy.   Psychiatric/Behavioral:  Negative for dysphoric mood. The patient is not nervous/anxious.        Objective:   BP (!)  93/54 (Patient Position: Sitting)   Pulse 79   Temp 97.1 °F (36.2 °C) (Tympanic)   Wt 93.3 kg (205 lb 11 oz)   SpO2 99%   BMI 25.71 kg/m²      Physical Exam  Vitals reviewed.   Constitutional:       General: He is not in acute distress.     Appearance: He is well-developed.   HENT:      Head: Normocephalic and atraumatic.      Right Ear: Tympanic membrane and ear canal normal.      Left Ear: Tympanic membrane and ear canal normal.   Eyes:      Pupils: Pupils are equal, round, and reactive to light.   Neck:      Thyroid: No thyromegaly.      Vascular: No JVD.   Cardiovascular:      Rate and Rhythm: Normal rate and regular rhythm.      Heart sounds: Normal heart sounds. No murmur heard.     No friction rub. No gallop.   Pulmonary:      Effort: Pulmonary effort is normal.      Breath sounds: Normal breath sounds. No wheezing or rales.   Abdominal:      General: Bowel sounds are normal. There is no distension.      Palpations: Abdomen is soft.      Tenderness: There is no abdominal tenderness. There is no guarding or rebound.   Musculoskeletal:         General: Normal range of motion.      Cervical back: Normal range of motion and neck supple.   Lymphadenopathy:      Cervical: No cervical adenopathy.   Skin:     General: Skin is warm and dry.      Findings: No rash.   Neurological:      General: No focal deficit present.      Mental Status: He is alert and oriented to person, place, and time.      Cranial Nerves: No cranial nerve deficit.      Deep Tendon Reflexes: Reflexes are normal and symmetric.   Psychiatric:         Mood and Affect: Mood normal.         Judgment: Judgment normal.         Orders Only on 03/09/2025   Component Date Value    QRS Duration 03/09/2025 96     [facility] QTC Calculation 03/09/2025 434    Admission on 03/09/2025, Discharged on 03/09/2025   Component Date Value    Hepatitis C Ab 03/09/2025 Non-reactive     HIV 1/2 Ag/Ab 03/09/2025 Non-reactive     QRS Duration 03/09/2025 90     [facility] QTC  Calculation 03/09/2025 427     BNP 03/09/2025 127 (H)     WBC 03/09/2025 4.24     RBC 03/09/2025 3.80 (L)     Hemoglobin 03/09/2025 11.3 (L)     Hematocrit 03/09/2025 33.8 (L)     MCV 03/09/2025 89     MCH 03/09/2025 29.7     MCHC 03/09/2025 33.4     RDW 03/09/2025 14.6 (H)     Platelets 03/09/2025 213     MPV 03/09/2025 10.0     Immature Granulocytes 03/09/2025 0.5     Gran # (ANC) 03/09/2025 2.2     Immature Grans (Abs) 03/09/2025 0.02     Lymph # 03/09/2025 1.4     Mono # 03/09/2025 0.3     Eos # 03/09/2025 0.4     Baso # 03/09/2025 0.04     nRBC 03/09/2025 0     Gran % 03/09/2025 51.0     Lymph % 03/09/2025 32.1     Mono % 03/09/2025 6.8     Eosinophil % 03/09/2025 8.7 (H)     Basophil % 03/09/2025 0.9     Differential Method 03/09/2025 Automated     Sodium 03/09/2025 143     Potassium 03/09/2025 4.3     Chloride 03/09/2025 112 (H)     CO2 03/09/2025 21 (L)     Glucose 03/09/2025 104     BUN 03/09/2025 22     Creatinine 03/09/2025 1.5 (H)     Calcium 03/09/2025 8.8     Total Protein 03/09/2025 6.8     Albumin 03/09/2025 3.5     Total Bilirubin 03/09/2025 0.4     Alkaline Phosphatase 03/09/2025 75     AST 03/09/2025 25     ALT 03/09/2025 21     eGFR 03/09/2025 46.2 (A)     Anion Gap 03/09/2025 10     Troponin I High Sensitiv* 03/09/2025 3     Specimen UA 03/09/2025 Urine, Clean Catch     Color, UA 03/09/2025 Yellow     Appearance, UA 03/09/2025 Clear     pH, UA 03/09/2025 7.0     Specific Gravity, UA 03/09/2025 1.015     Protein, UA 03/09/2025 Negative     Glucose, UA 03/09/2025 Negative     Ketones, UA 03/09/2025 Negative     Bilirubin (UA) 03/09/2025 Negative     Occult Blood UA 03/09/2025 Negative     Nitrite, UA 03/09/2025 Negative     Leukocytes, UA 03/09/2025 Negative     Magnesium 03/09/2025 1.7     POC Lactate 03/09/2025 3.2     POC Temp 03/09/2025 37.0     Specimen source 03/09/2025 [email or web address]     Performed By: 03/09/2025 SLee     FiO2 03/09/2025 21.0     BIPAP 03/09/2025 0     Blood Culture,  Routine 03/09/2025 No Growth to date     Blood Culture, Routine 03/09/2025 No Growth to date     Blood Culture, Routine 03/09/2025 No Growth to date     Blood Culture, Routine 03/09/2025 No Growth to date     Blood Culture, Routine 03/09/2025 No Growth to date     Blood Culture, Routine 03/09/2025 No Growth to date     Blood Culture, Routine 03/09/2025 No Growth to date     Blood Culture, Routine 03/09/2025 No Growth to date     Procalcitonin 03/09/2025 0.02     SARS-CoV-2 RNA, Amplific* 03/09/2025 Negative     Influenza A, Molecular 03/09/2025 Negative     Influenza B, Molecular 03/09/2025 Negative     Flu A & B Source 03/09/2025 NP     POC Lactate 03/09/2025 1.5     POC Temp 03/09/2025 37.0     Specimen source 03/09/2025 Venous     Performed By: 03/09/2025 CVINET     FiO2 03/09/2025 21.0     BIPAP 03/09/2025 0        Assessment:       1. Hypotension, unspecified hypotension type    2. 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 Combined hyperlipidemia associated with type 2 diabetes mellitus        Plan:   No problem-specific Assessment & Plan notes found for this encounter.    [place] was seen today for hospital follow up.    Diagnoses and all orders for this visit:    Hypotension, unspecified hypotension type  Comments:  stop metoprolol.  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 low carb diet    Combined hyperlipidemia associated with type 2 diabetes mellitus  Comments:  continue statin      Assessment & Plan    ORTHOSTATIC HYPOTENSION:  - Determined low blood pressure as the primary issue, likely exacerbated by current antihypertensive medications.  - Discontinued all antihypertensive medications to allow blood pressure to stabilize,  prioritizing this over addressing pain.  - Explained the relationship between hypotension and current symptoms (dizziness, feeling cold upon standing).  - Discussed how pain typically increases blood pressure rather than lowering it.  - Educated the patient on the need to prioritize addressing hypotension over pain management due to potential risks.  - Instructed the patient to monitor blood pressure at home 3 times daily.  - Advised the patient to contact the office if blood pressure consistently exceeds 160/100 or if symptoms worsen.  - Noted that the patient's blood pressure is low at 90/60 mmHg, likely due to the adverse effect of metoprolol.  - Instructed the patient to stop metoprolol immediately and monitor blood pressure at home.  - Patient to monitor blood pressure at home 3 times daily.    TYPE 2 DIABETES MELLITUS:  - Instructed the patient to continue current diabetes management routine.  - Noted that the patient's glucose levels have been between 90 and 120 mg/dL, with an average of 100 mg/dL.  - Patient to maintain current eating habits.    RESTLESS LEGS SYNDROME:  - Continued Mirapex for restless legs syndrome management.  - Noted that patient reports not taking Mirapex for almost a week.  - Considered that Mirapex could potentially affect blood pressure, but assessed it's not the main cause of current issues.    ADVERSE EFFECT OF BETA-ADRENORECEPTOR ANTAGONISTS:  - Assessed that metoprolol is likely the main contributor to current symptoms.  - Discontinued Losartan and Metoprolol.    DEHYDRATION:  - Attributed some symptoms to dehydration, which may have affected lab values.  - Recommend increasing fluid intake to improve hydration.  - Noted that the patient reports drinking 3-4 bottles of water per day.  - Acknowledged that dehydration could have affected electrolyte levels and contributed to symptoms.  - Observed that current electrolyte levels (magnesium and calcium) are normal.  - Recommend  increasing fluid intake to improve hydration.  - Evaluated recent lab results, noting electrolytes, magnesium, and calcium were normal.    FOLLOW-UP:  - Scheduled a follow-up visit in 1 week to reassess blood pressure and symptoms.         Follow up in about 1 week (around 3/19/2025) for hypotension, with Jose Roger.    This note was generated with the assistance of ambient listening technology. Verbal consent was obtained by the patient and accompanying visitor(s) for the recording of patient appointment to facilitate this note

## 2025-03-14 LAB
BACTERIA BLD CULT: NORMAL
BACTERIA BLD CULT: NORMAL

## 2025-03-17 ENCOUNTER — OFFICE VISIT (OUTPATIENT)
Dept: CARDIOLOGY | Facility: CLINIC | Age: 85
End: 2025-03-17
Payer: MEDICARE

## 2025-03-17 VITALS
WEIGHT: 209.19 LBS | OXYGEN SATURATION: 96 % | SYSTOLIC BLOOD PRESSURE: 124 MMHG | HEART RATE: 96 BPM | DIASTOLIC BLOOD PRESSURE: 62 MMHG | BODY MASS INDEX: 26.15 KG/M2

## 2025-03-17 DIAGNOSIS — E11.22 DIABETES MELLITUS WITH STAGE 3 CHRONIC KIDNEY DISEASE: ICD-10-CM

## 2025-03-17 DIAGNOSIS — E11.42 TYPE 2 DIABETES MELLITUS WITH DIABETIC POLYNEUROPATHY, WITHOUT LONG-TERM CURRENT USE OF INSULIN: ICD-10-CM

## 2025-03-17 DIAGNOSIS — E11.69 COMBINED HYPERLIPIDEMIA ASSOCIATED WITH TYPE 2 DIABETES MELLITUS: ICD-10-CM

## 2025-03-17 DIAGNOSIS — I15.2 HYPERTENSION ASSOCIATED WITH DIABETES: ICD-10-CM

## 2025-03-17 DIAGNOSIS — E11.51 TYPE 2 DIABETES MELLITUS WITH DIABETIC PERIPHERAL ANGIOPATHY WITHOUT GANGRENE, WITHOUT LONG-TERM CURRENT USE OF INSULIN: ICD-10-CM

## 2025-03-17 DIAGNOSIS — I70.0 ATHEROSCLEROSIS OF AORTA: ICD-10-CM

## 2025-03-17 DIAGNOSIS — N18.30 DIABETES MELLITUS WITH STAGE 3 CHRONIC KIDNEY DISEASE: ICD-10-CM

## 2025-03-17 DIAGNOSIS — I70.0 AORTIC CALCIFICATION: ICD-10-CM

## 2025-03-17 DIAGNOSIS — R06.09 DYSPNEA ON EXERTION: ICD-10-CM

## 2025-03-17 DIAGNOSIS — M47.26 OSTEOARTHRITIS OF SPINE WITH RADICULOPATHY, LUMBAR REGION: ICD-10-CM

## 2025-03-17 DIAGNOSIS — R06.09 OTHER FORM OF DYSPNEA: Primary | ICD-10-CM

## 2025-03-17 DIAGNOSIS — E11.59 HYPERTENSION ASSOCIATED WITH DIABETES: ICD-10-CM

## 2025-03-17 DIAGNOSIS — R42 DIZZINESS: ICD-10-CM

## 2025-03-17 DIAGNOSIS — R09.89 CAROTID BRUIT, UNSPECIFIED LATERALITY: ICD-10-CM

## 2025-03-17 DIAGNOSIS — N18.30 STAGE 3 CHRONIC KIDNEY DISEASE, UNSPECIFIED WHETHER STAGE 3A OR 3B CKD: ICD-10-CM

## 2025-03-17 DIAGNOSIS — E78.2 COMBINED HYPERLIPIDEMIA ASSOCIATED WITH TYPE 2 DIABETES MELLITUS: ICD-10-CM

## 2025-03-17 PROCEDURE — 1125F AMNT PAIN NOTED PAIN PRSNT: CPT | Mod: HCNC,CPTII,S$GLB, | Performed by: INTERNAL MEDICINE

## 2025-03-17 PROCEDURE — 99999 PR PBB SHADOW E&M-EST. PATIENT-LVL IV: CPT | Mod: PBBFAC,HCNC,, | Performed by: INTERNAL MEDICINE

## 2025-03-17 PROCEDURE — 1100F PTFALLS ASSESS-DOCD GE2>/YR: CPT | Mod: HCNC,CPTII,S$GLB, | Performed by: INTERNAL MEDICINE

## 2025-03-17 PROCEDURE — 1159F MED LIST DOCD IN RCRD: CPT | Mod: HCNC,CPTII,S$GLB, | Performed by: INTERNAL MEDICINE

## 2025-03-17 PROCEDURE — 3288F FALL RISK ASSESSMENT DOCD: CPT | Mod: HCNC,CPTII,S$GLB, | Performed by: INTERNAL MEDICINE

## 2025-03-17 PROCEDURE — 99215 OFFICE O/P EST HI 40 MIN: CPT | Mod: HCNC,S$GLB,, | Performed by: INTERNAL MEDICINE

## 2025-03-17 PROCEDURE — 1160F RVW MEDS BY RX/DR IN RCRD: CPT | Mod: HCNC,CPTII,S$GLB, | Performed by: INTERNAL MEDICINE

## 2025-03-17 PROCEDURE — 3074F SYST BP LT 130 MM HG: CPT | Mod: HCNC,CPTII,S$GLB, | Performed by: INTERNAL MEDICINE

## 2025-03-17 PROCEDURE — 3078F DIAST BP <80 MM HG: CPT | Mod: HCNC,CPTII,S$GLB, | Performed by: INTERNAL MEDICINE

## 2025-03-17 PROCEDURE — G2211 COMPLEX E/M VISIT ADD ON: HCPCS | Mod: HCNC,S$GLB,, | Performed by: INTERNAL MEDICINE

## 2025-03-17 NOTE — PROGRESS NOTES
"Subjective:   Patient ID:  Srinath Mcknight is a 84 y.o. male who presents for cardiac consult of Shortness of Breath and Dizziness (PT stated he has been to ER with hypotension, SOB with dizziness)      Referral by: No referring provider defined for this encounter.     Reason for consult:       HPI  The patient came in today for cardiac consult of Shortness of Breath and Dizziness (PT stated he has been to ER with hypotension, SOB with dizziness)      Srinath Mcknight is a 84 y.o. male pt with hypertension, diabetes, DJD, HLD, CAD, CKD here for CV follow up.       4/26/24  Having knee and hip pain  Fell about 1 month ago, was not using his cane at the time   Balancing issues - chronic   SOB is stable  Back pain with walking   Weight up 9 lbs since last visit  Good appetite - wife cooks most of his foods.     3/17/25  PT of Dr. Calvillo seen last April 2024 here for follow up.   ECHO and Nuc stress neg 10/2021.     ER eval 3/9/25  Chief Complaint   Patient presents with    Shortness of Breath       SOB, uti, hospitalized last Saturday and discharged Monday. Electrolytes were low. Somewhat dizziness. "Couldn't walk too far"      The patient is an 84-year-old male.  He has a documented past medical history significant for hypertension, hyperlipidemia, diabetes, CKD, CAD, anemia, sleep apnea, neuropathy, chronic back pain, arthritis, gout, hearing aid dependence, ambulates with cane/walker.  He presents to the emergency room for an urgent evaluation with multiple complaints.  His chief complaint is dizziness/lightheadedness and unsteady gait.  He states for the past few weeks he has had increased lightheadedness, especially when going from a sitting to standing position.  He states that he does experience a spinning sensation and has to hold onto something to steady himself and prevent falling.  He states that he has had several falls this month because of this.  His wife contributes that he seems to have developed a " "change in his speech over the past 3-4 weeks.  He denies any headaches, or vision changes.  He reports chronic low back pain, but states that this is not new or worse, and denies any acute focal numbness or weakness. He denies any acute changes in bowel or bladder habits, but does report chronic constipation, manages with lactulose, last BM yesterday, not diarrhea, black or bloody. They live between [place] and [place] and report that he was seen at [facility] ER for these complaints last weekend and was admitted.  She states that during this admission they were told that his calcium and magnesium levels were low and that he had a urinary tract infection. She states that he was given supplements and also prescribed an antibiotic that he completed course yesterday, but does not recall the name of the medication.  He states that he is not experiencing any dysuria or difficulty urinating. He denies any fevers chills or sweats.  He does state that he does become short of breath with activity, but this is also long standing, not new or worse. He denies having any coughing or wheezing. He denies having any chest pain. They report that he was seen at an emergency room in [place] last night for all of this, and was told that his blood pressure was low, but was ultimately discharged. Pt and wife stating they feel that they were not given any further explanation or treatment plan for symptoms and are "worried that he may have had a stroke", prompting ED visit here for second opinion. He did not take his regular medications this morning.      Serial blood pressure reading progressively increasing throughout ED stay. 110/50 on arrival --> 200/91. Pt did not take BP meds today. He is prescribed Losartan 50 and metoprolol 50. Will give Losartan in ED.   Lengthy discussion held after work up regarding results. Expressed relief regarding neg CT/MRI. Dizziness/lightheadedness seems positional, but sounds like may be " due to low BP more so than vertigo. Episodes seem to happen in morning after BP meds, may need to reduce meds. Shared decision making regarding disposition, considered/offered admission, however, currently patient is not dizzy with standing and BP is notable elevated after not taking meds today; states that he has an appointment scheduled with his PCP on Tuesday and prefers discharge. Advised to monitor BP closely at home and record daily log to take and review with PCP. Advised fall precautions and gave detailed instructions regarding standing up to quickly from seated position, wife agrees to supervise assist him to prevent falls. If BP readings are low after AM meds, will call PCP and consider holding BBlocker.   Patient now is also asking about back pain medication. States that he was prescribed gabapentin, but stopped taking it because feels it given him RLS and interrupts his sleep, has only been taking Tylenol. Will try Lidoderm patches and also discuss with PCP.   Return precautions advised. Verbalized understanding and comfort with plan.       Pt presents here post ER eval for hypotension/orthostasis     BP and HR stable today. BMI 26 - 209 lbs   His BP has been stable at home.   He has more back pain and knee   He has more DAMON.     BNP (pg/mL)   Date Value   03/09/2025 127 (H)        Results for orders placed during the hospital encounter of 10/12/21    Echo    Interpretation Summary  · The left ventricle is normal in size with concentric remodeling and normal systolic function.  · Normal left ventricular diastolic function.  · Normal right ventricular size with normal right ventricular systolic function.  · The estimated ejection fraction is 55%.  · Mild aortic regurgitation.      Results for orders placed during the hospital encounter of 10/12/21    Nuclear Stress - Cardiology Interpreted    Interpretation Summary    Normal myocardial perfusion scan. There is no evidence of myocardial ischemia or  infarction.    The gated perfusion images showed an ejection fraction of 47% at rest. The gated perfusion images showed an ejection fraction of 63% post stress.    The EKG portion of this study is uninterpretable due to significant baseline abnormalities    The patient reported no chest pain during the stress test.      No results found for this or any previous visit.      No cardiac monitor results found for the past 12 months         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    Trouble in sleeping     Type 2 diabetes mellitus with diabetic polyneuropathy, without long-term current use of insulin     Urinary incontinence     Uses hearing aid     bilat       Past Surgical History:   Procedure Laterality Date    BACK SURGERY  2019    COLONOSCOPY N/A 6/30/2020    Procedure: COLONOSCOPY;  Surgeon: Joseph Iraheta MD;  Location: [facility] ENDO;  Service: Endoscopy;  Laterality: N/A;    ESOPHAGOGASTRODUODENOSCOPY N/A 6/30/2020    Procedure: EGD (ESOPHAGOGASTRODUODENOSCOPY);  Surgeon: Joseph Iraheta MD;  Location: [facility] ENDO;  Service: Endoscopy;  Laterality: N/A;    HERNIA REPAIR Bilateral 04/18/2017    INJECTION OF JOINT Right 4/14/2023    Procedure: right Synvisc Knee injection;  Surgeon: Hossein Brooks MD;  Location: [facility] PAIN MGT;  Service: Pain Management;  Laterality: Right;    INJECTION OF JOINT Right 12/16/2024    Procedure:  RT Intra-articular hip injection;  Surgeon: Merissa Kelly DO;  Location: [facility] PAIN MANAGEMENT;  Service: Pain Management;  Laterality: Right;  oral sed-no ac    JOINT REPLACEMENT Left 10/09/2017    L YAHIR Dr. Alcocer    LAMINECTOMY  07/22/2016    left elbow  10/2017    procedure to remove gout    lumbar foraminotomy  03/2018    REVISION TOTAL HIP ARTHROPLASTY Left 9/11/2018    Procedure: REVISION, TOTAL ARTHROPLASTY, HIP;  Surgeon: Albaro Alcocer Sr., MD;  Location: [facility] OR;  Service: Orthopedics;  Laterality: Left;    ROTATOR CUFF REPAIR Left 2006    SHOULDER ARTHROSCOPY W/ ROTATOR CUFF REPAIR Right 2009    TRANSFORAMINAL EPIDURAL INJECTION OF STEROID Right 10/11/2024    Procedure: Right L4/5 + L5/S1 TF GAEL;  Surgeon: Hossein Brooks MD;  Location: [facility] PAIN MGT;  Service: Pain Management;  Laterality: Right;       Social History[1]    Family History   Problem Relation Name Age of Onset    Hypertension Mother      Heart disease Mother      Diabetes Mother      Hypertension Father      Heart disease Father      Diabetes Father      Stroke Father      Diabetes Sister      Cancer Brother  50        pancreas    Drug abuse Brother      Prostate cancer Neg Hx      Macular degeneration Neg Hx      Retinal detachment Neg Hx      Strabismus Neg Hx      Glaucoma Neg Hx      Blindness Neg Hx      Amblyopia Neg Hx      Kidney disease Neg Hx      Mental illness Neg Hx      Intellectual disability Neg Hx      COPD Neg Hx      Asthma Neg Hx         Patient's Medications   New Prescriptions    No medications on file   Previous Medications    ALFUZOSIN (UROXATRAL) 10 MG TB24    Take 1 tablet (10 mg total) by mouth daily with breakfast.    ALLOPURINOL (ZYLOPRIM) 100 MG TABLET    TAKE 1 TABLET EVERY DAY    BLOOD GLUCOSE CONTROL, LOW SOLN    by Misc.(Non-Drug; Combo Route) route.    BLOOD-GLUCOSE METER (TRUE METRIX AIR GLUCOSE METER) KIT    USE AS DIRECTED    CLOTRIMAZOLE (LOTRIMIN) 1 % CREAM    Apply topically 2 (two) times daily.  for 10 days    CYANOCOBALAMIN, VITAMIN B-12, 1,000 MCG SUBL    Place 1,000 mcg under the tongue once daily.    DICLOFENAC SODIUM (VOLTAREN) 1 % GEL    Apply 2 g topically 4 (four) times daily as needed (pain).    FLUTICASONE PROPIONATE (FLONASE) 50 MCG/ACTUATION NASAL SPRAY    1 spray (50 mcg total) by Each Nostril route once daily.    GARLIC EXTRACT ORAL    Take 1 tablet by mouth once daily. Per Negin LIAO    LACTULOSE (CHRONULAC) 10 GRAM/15 ML SOLUTION    SMARTSI Milliliter(s) By Mouth Daily    LEVOCETIRIZINE (XYZAL) 5 MG TABLET    Take 1 tablet (5 mg total) by mouth every evening.    LIDOCAINE (LIDODERM) 5 %    Place 1 patch onto the skin daily as needed (pain). Remove & Discard patch within 12 hours or as directed by MD    MELATONIN 10 MG TAB    Take 1.5 tablets (15 mg total) by mouth every evening.    METFORMIN (GLUCOPHAGE) 1000 MG TABLET    TAKE 1 TABLET TWICE DAILY WITH MEALS    MOMETASONE 0.1% (ELOCON) 0.1 % CREAM    Apply topically once daily.    MULTIVITAMIN (THERAGRAN) PER TABLET    Take 1 tablet by mouth once daily.    PRAMIPEXOLE (MIRAPEX) 0.125 MG TABLET    Take 1 tablet (0.125 mg total) by mouth every evening.    PRAVASTATIN (PRAVACHOL) 40 MG TABLET    TAKE 1 TABLET EVERY DAY    SILDENAFIL (VIAGRA) 100 MG TABLET    Take 1 tablet (100 mg total) by mouth daily as needed for Erectile Dysfunction.    SOLIFENACIN (VESICARE) 10 MG TABLET    Take 1 tablet (10 mg total) by mouth once daily.    TRUE METRIX GLUCOSE TEST STRIP STRP    USE AS DIRECTED TO CHECK SUGARS    TRUEPLUS LANCETS 33 GAUGE MISC    USE AS DIRECTED TO CHECK SUGARS   Modified Medications    No medications on file   Discontinued Medications    No medications on file       Review of Systems   Constitutional:  Positive for malaise/fatigue.   HENT: Negative.     Eyes: Negative.    Respiratory:  Positive for shortness of breath.    Cardiovascular: Negative.    Gastrointestinal: Negative.    Genitourinary: Negative.    Musculoskeletal:   Positive for back pain and joint pain.   Skin: Negative.    Neurological:  Positive for dizziness and focal weakness.   Endo/Heme/Allergies: Negative.    Psychiatric/Behavioral: Negative.     All 12 systems otherwise negative.      Wt Readings from Last 3 Encounters:   03/17/25 94.9 kg (209 lb 3.5 oz)   03/12/25 93.3 kg (205 lb 11 oz)   03/09/25 95.3 kg (210 lb)     Temp Readings from Last 3 Encounters:   03/12/25 97.1 °F (36.2 °C) (Tympanic)   03/09/25 97.9 °F (36.6 °C)   01/24/25 96.8 °F (36 °C) (Tympanic)     BP Readings from Last 3 Encounters:   03/17/25 124/62   03/12/25 (!) 93/54   03/09/25 (!) 199/91     Pulse Readings from Last 3 Encounters:   03/17/25 96   03/12/25 79   03/09/25 60       /62 (BP Location: Left arm, Patient Position: Sitting)   Pulse 96   Wt 94.9 kg (209 lb 3.5 oz)   SpO2 96%   BMI 26.15 kg/m²     Objective:   Physical Exam  Vitals and nursing note reviewed.   Constitutional:       General: He is not in acute distress.     Appearance: He is well-developed. He is not diaphoretic.   HENT:      Head: Normocephalic and atraumatic.      Nose: Nose normal.   Eyes:      General: No scleral icterus.     Conjunctiva/sclera: Conjunctivae normal.   Neck:      Thyroid: No thyromegaly.      Vascular: No JVD.   Cardiovascular:      Rate and Rhythm: Normal rate and regular rhythm.      Heart sounds: S1 normal and S2 normal. Murmur heard.      No friction rub. No gallop. No S3 or S4 sounds.   Pulmonary:      Effort: Pulmonary effort is normal. No respiratory distress.      Breath sounds: Normal breath sounds. No stridor. No wheezing or rales.   Chest:      Chest wall: No tenderness.   Abdominal:      General: Bowel sounds are normal. There is no distension.      Palpations: Abdomen is soft. There is no mass.      Tenderness: There is no abdominal tenderness. There is no rebound.   Genitourinary:     Comments: Deferred  Musculoskeletal:         General: No tenderness or deformity. Normal range of  motion.      Cervical back: Normal range of motion and neck supple.   Lymphadenopathy:      Cervical: No cervical adenopathy.   Skin:     General: Skin is warm and dry.      Coloration: Skin is not pale.      Findings: No erythema or rash.   Neurological:      Mental Status: He is alert and oriented to person, place, and time.      Motor: No abnormal muscle tone.      Coordination: Coordination normal.   Psychiatric:         Behavior: Behavior normal.         Thought Content: Thought content normal.         Judgment: Judgment normal.         Lab Results   Component Value Date     03/09/2025    K 4.3 03/09/2025     (H) 03/09/2025    CO2 21 (L) 03/09/2025    BUN 22 03/09/2025    CREATININE 1.5 (H) 03/09/2025     03/09/2025    HGBA1C 6.1 (H) 01/24/2025    MG 1.7 03/09/2025    AST 25 03/09/2025    ALT 21 03/09/2025    ALBUMIN 3.5 03/09/2025    PROT 6.8 03/09/2025    BILITOT 0.4 03/09/2025    WBC 4.24 03/09/2025    HGB 11.3 (L) 03/09/2025    HCT 33.8 (L) 03/09/2025    MCV 89 03/09/2025     03/09/2025    INR 1.0 07/31/2019    TSH 1.198 01/24/2025    CHOL 134 01/24/2025    HDL 29 (L) 01/24/2025    LDLCALC 72.8 01/24/2025    TRIG 161 (H) 01/24/2025     (H) 03/09/2025         BNP (pg/mL)   Date Value   03/09/2025 127 (H)     INR (no units)   Date Value   07/31/2019 1.0   02/08/2018 0.9   07/19/2016 0.9   03/10/2015 1.0          Assessment:      1. Other form of dyspnea    2. Aortic calcification    3. Combined hyperlipidemia associated with type 2 diabetes mellitus    4. Atherosclerosis of aorta    5. Osteoarthritis of spine with radiculopathy, lumbar region    6. Hypertension associated with diabetes    7. Type 2 diabetes mellitus with diabetic polyneuropathy, without long-term current use of insulin    8. Stage 3 chronic kidney disease, unspecified whether stage 3a or 3b CKD    9. Type 2 diabetes mellitus with diabetic peripheral angiopathy without gangrene, without long-term current use of  insulin    10. Dyspnea on exertion    11. Diabetes mellitus with stage 3 chronic kidney disease    12. Carotid bruit, unspecified laterality    13. Dizziness        Plan:       Dyspnea on exertion  Echo 10/21 with normal EF, mild AI (unchanged from prior)  Pharmacologic nuclear stress test 10/21 without evidence of ischemia  - repeat ECHO and pharm nuclear stress test, pt cannot walk on treadmill   - had low BNP - but cannot start diuretics now      Dizziness- intermittent , with hearing loss  - f/u ENT   Had stopped flomax  - order carotid us, f/u neuro - had recent Neg MRI      CAD  H/o calcified aorta  Denies angina  Continue aspirin and statin     CKD  stable     HLD  LDL 78 as of 10/23  Continue pravastatin 40 mg daily     HTN - low lately   Titrate meds  - BP meds held now  - needs more salt if BP is low      Diabetes  Stable, A1c 6.4  Continue therapy      Arthritis  Chronic back and knee pain  Ambulates with a cane  -f.u PCP     Visit today included increased complexity associated with the care of the episodic problem dyspnea addressed and managing the longitudinal care of the patient due to the serious and/or complex managed problem(s) .      Thank you for allowing me to participate in this patient's care. Please do not hesitate to contact me with any questions or concerns. Consult note has been forwarded to the referral physician.          [1]   Social History  Tobacco Use    Smoking status: Never     Passive exposure: Never    Smokeless tobacco: Never   Substance Use Topics    Alcohol use: Not Currently    Drug use: No

## 2025-03-18 ENCOUNTER — OFFICE VISIT (OUTPATIENT)
Dept: INTERNAL MEDICINE | Facility: CLINIC | Age: 85
End: 2025-03-18
Payer: MEDICARE

## 2025-03-18 ENCOUNTER — PATIENT MESSAGE (OUTPATIENT)
Dept: CARDIOLOGY | Facility: HOSPITAL | Age: 85
End: 2025-03-18
Payer: MEDICARE

## 2025-03-18 ENCOUNTER — HOSPITAL ENCOUNTER (OUTPATIENT)
Dept: RADIOLOGY | Facility: HOSPITAL | Age: 85
Discharge: HOME | End: 2025-03-18
Attending: NURSE PRACTITIONER
Payer: MEDICARE

## 2025-03-18 VITALS
TEMPERATURE: 98 F | HEART RATE: 91 BPM | DIASTOLIC BLOOD PRESSURE: 74 MMHG | BODY MASS INDEX: 25.82 KG/M2 | HEIGHT: 75 IN | OXYGEN SATURATION: 96 % | WEIGHT: 207.69 LBS | SYSTOLIC BLOOD PRESSURE: 130 MMHG

## 2025-03-18 DIAGNOSIS — E11.59 HYPERTENSION ASSOCIATED WITH DIABETES: ICD-10-CM

## 2025-03-18 DIAGNOSIS — I15.2 HYPERTENSION ASSOCIATED WITH DIABETES: ICD-10-CM

## 2025-03-18 DIAGNOSIS — M79.645 FINGER PAIN, LEFT: ICD-10-CM

## 2025-03-18 DIAGNOSIS — M25.552 PAIN IN LEFT HIP: ICD-10-CM

## 2025-03-18 DIAGNOSIS — E11.3311 TYPE 2 DIABETES MELLITUS WITH MODERATE NONPROLIFERATIVE RETINOPATHY OF RIGHT EYE AND MACULAR EDEMA, UNSPECIFIED WHETHER LONG TERM INSULIN USE: ICD-10-CM

## 2025-03-18 DIAGNOSIS — R22.42 LOCALIZED SWELLING OF LEFT LOWER LEG: ICD-10-CM

## 2025-03-18 DIAGNOSIS — R42 DIZZINESS: ICD-10-CM

## 2025-03-18 DIAGNOSIS — I95.9 HYPOTENSION, UNSPECIFIED HYPOTENSION TYPE: Primary | ICD-10-CM

## 2025-03-18 PROCEDURE — 73502 X-RAY EXAM HIP UNI 2-3 VIEWS: CPT | Mod: TC,HCNC,FY,PO,LT

## 2025-03-18 PROCEDURE — 1101F PT FALLS ASSESS-DOCD LE1/YR: CPT | Mod: CPTII,S$GLB,, | Performed by: NURSE PRACTITIONER

## 2025-03-18 PROCEDURE — 73140 X-RAY EXAM OF FINGER(S): CPT | Mod: 26,HCNC,LT, | Performed by: RADIOLOGY

## 2025-03-18 PROCEDURE — 99999 PR PBB SHADOW E&M-EST. PATIENT-LVL V: CPT | Mod: PBBFAC,HCNC,, | Performed by: NURSE PRACTITIONER

## 2025-03-18 PROCEDURE — 1159F MED LIST DOCD IN RCRD: CPT | Mod: CPTII,S$GLB,, | Performed by: NURSE PRACTITIONER

## 2025-03-18 PROCEDURE — 3288F FALL RISK ASSESSMENT DOCD: CPT | Mod: CPTII,S$GLB,, | Performed by: NURSE PRACTITIONER

## 2025-03-18 PROCEDURE — G2211 COMPLEX E/M VISIT ADD ON: HCPCS | Mod: S$GLB,,, | Performed by: NURSE PRACTITIONER

## 2025-03-18 PROCEDURE — 3078F DIAST BP <80 MM HG: CPT | Mod: CPTII,S$GLB,, | Performed by: NURSE PRACTITIONER

## 2025-03-18 PROCEDURE — 1160F RVW MEDS BY RX/DR IN RCRD: CPT | Mod: CPTII,S$GLB,, | Performed by: NURSE PRACTITIONER

## 2025-03-18 PROCEDURE — 3075F SYST BP GE 130 - 139MM HG: CPT | Mod: CPTII,S$GLB,, | Performed by: NURSE PRACTITIONER

## 2025-03-18 PROCEDURE — 73140 X-RAY EXAM OF FINGER(S): CPT | Mod: TC,HCNC,FY,PO

## 2025-03-18 PROCEDURE — 99214 OFFICE O/P EST MOD 30 MIN: CPT | Mod: S$GLB,,, | Performed by: NURSE PRACTITIONER

## 2025-03-18 PROCEDURE — 73502 X-RAY EXAM HIP UNI 2-3 VIEWS: CPT | Mod: 26,HCNC,LT, | Performed by: RADIOLOGY

## 2025-03-18 NOTE — PROGRESS NOTES
"Subjective:       Patient ID: Srinath Mcknight is a 84 y.o. male.    CHIEF COMPLAINT:  - Mr. Mcknight presents for follow-up of blood pressure issues and evaluation of pain in multiple areas, including fingers, back, hip, and foot.    HPI:  Mr. Mcknight reports improvement in blood pressure over the past week, with home readings ranging from 121/65 to highs of 142 and 146. He is not currently taking any blood pressure medication. He fell about a month ago, injuring his left hand. He initially manipulated two fingers back into place but now has pain and occasional difficulty bending them.    He reports new pain in his left hip and back that started last night or this morning. This is unusual as his right hip typically causes more trouble due to severe arthritis. He has a history of left hip replacement, which normally does not bother him. He has difficulty walking due to the hip pain.    He woke up this morning with swelling in his foot. He ate two small hamburgers yesterday, one for lunch and another around 6 PM. He was evaluated by a cardiologist yesterday who checked his leg, which was not swollen at that time.    For pain management, he takes two Tylenol in the morning only. He reports constant pain in his right hip that persists despite having received a shot for it. He also has knee pain that radiates up through his hip. He was evaluated by Dr. Melton yesterday, who ordered cardiac testing.    He denies any low blood pressure readings or dizziness.        /74 (Patient Position: Sitting)   Pulse 91   Temp 97.7 °F (36.5 °C) (Tympanic)   Ht 6' 3" (1.905 m)   Wt 94.2 kg (207 lb 10.8 oz)   SpO2 96%   BMI 25.96 kg/m²     Review of Systems   Constitutional:  Negative for fever and unexpected weight change.   HENT:  Negative for hearing loss, postnasal drip and rhinorrhea.    Eyes:  Negative for pain and visual disturbance.   Respiratory:  Negative for cough, shortness of breath and wheezing.    Cardiovascular:  " Positive for leg swelling. Negative for chest pain and palpitations.   Gastrointestinal:  Negative for constipation, diarrhea, nausea and vomiting.   Genitourinary:  Negative for dysuria and hematuria.   Musculoskeletal:  Positive for arthralgias, back pain, gait problem and myalgias. Negative for neck stiffness.   Skin:  Negative for pallor and rash.   Neurological:  Negative for seizures, syncope and headaches.   Hematological:  Negative for adenopathy.   Psychiatric/Behavioral:  Negative for dysphoric mood. The patient is not nervous/anxious.        Objective:      Physical Exam  Vitals and nursing note reviewed.   Constitutional:       General: He is not in acute distress.     Appearance: He is well-developed. He is not diaphoretic.   HENT:      Head: Normocephalic and atraumatic.   Cardiovascular:      Rate and Rhythm: Normal rate and regular rhythm.      Heart sounds: Normal heart sounds.   Pulmonary:      Effort: Pulmonary effort is normal. No respiratory distress.      Breath sounds: Normal breath sounds.   Musculoskeletal:      Left hand: Tenderness present.        Hands:       Right hip: Tenderness present. Decreased range of motion. Decreased strength.      Left hip: Tenderness present. Decreased range of motion. Decreased strength.      Left lower le+ Edema present.      Comments: Walking with walker   Skin:     General: Skin is warm and dry.      Findings: No rash.   Neurological:      Mental Status: He is alert and oriented to person, place, and time.   Psychiatric:         Behavior: Behavior normal.         Thought Content: Thought content normal.         Judgment: Judgment normal.         Assessment and Plan        [place] was seen today for follow-up.    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      1. Hypotension, unspecified hypotension type    2. 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 Dizziness    4. Finger pain, left    5. Pain in left hip    6. Hypertension associated with diabetes    7. Localized swelling of left lower leg    8. BMI 25.0-25.9,adult        Assessment & Plan    HYPERTENSION:  - Assessed blood pressure readings, noting improvement but still some elevated readings.  - Maintained current management to avoid causing low blood pressure episodes and potential falls.  - Explained the rationale for tolerating slightly elevated blood pressure in older patients to prevent falls.  - Observed leg swelling but unable to prescribe diuretics due to blood pressure concerns, aligning with cardiologist's recommendation.  - Discussed the importance of elevating swollen leg above heart level when at home, such as in a recliner or on pillows.    OSTEOARTHRITIS OF HIP:  - Hip pain likely arthritis exacerbation from fall impact.  - Noted significant arthritis in right hip and previous left hip replacement.  - Ordered XR Left Hip to investigate new pain, considering potential wear and tear from compensating for right hip issues.    PAIN MANAGEMENT:  - Clarified that Tylenol is processed through the liver, not the kidneys, making it a safe option for pain management in older patients.  - Educated on the daily maximum dose of Tylenol (4,000 mg) and its safety when used as directed.  - Increased Tylenol (acetaminophen) to 500 mg 2 tablets 3 times daily (breakfast, lunch,  dinner) for better pain control, not to exceed 3,000 mg per day.    FINGER INJURY:  - Ordered XR Left 2 Fingers to rule out fracture, suspecting possible inflammation from self-reduction of left hand injury from fall a month ago.    FOLLOW-UP:  - follow up with Dr. Wilder in 3 months and PRN         This note was generated with the assistance of ambient listening technology. Verbal consent was obtained by the patient and accompanying visitor(s) for the recording of patient appointment to facilitate this note. I attest to having reviewed and edited the generated note for accuracy, though some syntax or spelling errors may persist. Please contact the author of this note for any clarification.

## 2025-03-19 ENCOUNTER — OFFICE VISIT (OUTPATIENT)
Dept: UROLOGY | Facility: CLINIC | Age: 85
End: 2025-03-19
Payer: MEDICARE

## 2025-03-19 ENCOUNTER — RESULTS FOLLOW-UP (OUTPATIENT)
Dept: INTERNAL MEDICINE | Facility: CLINIC | Age: 85
End: 2025-03-19
Payer: MEDICARE

## 2025-03-19 VITALS
WEIGHT: 207.69 LBS | DIASTOLIC BLOOD PRESSURE: 55 MMHG | RESPIRATION RATE: 16 BRPM | HEART RATE: 103 BPM | HEIGHT: 75 IN | BODY MASS INDEX: 25.82 KG/M2 | SYSTOLIC BLOOD PRESSURE: 91 MMHG

## 2025-03-19 DIAGNOSIS — N30.00 ACUTE CYSTITIS WITHOUT HEMATURIA: Primary | ICD-10-CM

## 2025-03-19 PROCEDURE — 99999 PR PBB SHADOW E&M-EST. PATIENT-LVL IV: CPT | Mod: PBBFAC,,, | Performed by: NURSE PRACTITIONER

## 2025-03-19 PROCEDURE — 99214 OFFICE O/P EST MOD 30 MIN: CPT | Mod: S$GLB,,, | Performed by: NURSE PRACTITIONER

## 2025-03-19 PROCEDURE — 3078F DIAST BP <80 MM HG: CPT | Mod: CPTII,S$GLB,, | Performed by: NURSE PRACTITIONER

## 2025-03-19 PROCEDURE — 1125F AMNT PAIN NOTED PAIN PRSNT: CPT | Mod: CPTII,S$GLB,, | Performed by: NURSE PRACTITIONER

## 2025-03-19 PROCEDURE — 3288F FALL RISK ASSESSMENT DOCD: CPT | Mod: CPTII,S$GLB,, | Performed by: NURSE PRACTITIONER

## 2025-03-19 PROCEDURE — 3074F SYST BP LT 130 MM HG: CPT | Mod: CPTII,S$GLB,, | Performed by: NURSE PRACTITIONER

## 2025-03-19 PROCEDURE — 1159F MED LIST DOCD IN RCRD: CPT | Mod: CPTII,S$GLB,, | Performed by: NURSE PRACTITIONER

## 2025-03-19 PROCEDURE — 1101F PT FALLS ASSESS-DOCD LE1/YR: CPT | Mod: CPTII,S$GLB,, | Performed by: NURSE PRACTITIONER

## 2025-03-19 PROCEDURE — 1160F RVW MEDS BY RX/DR IN RCRD: CPT | Mod: CPTII,S$GLB,, | Performed by: NURSE PRACTITIONER

## 2025-03-19 RX ORDER — CLOTRIMAZOLE AND BETAMETHASONE DIPROPIONATE 10; .64 MG/G; MG/G
CREAM TOPICAL 2 TIMES DAILY
Qty: 45 G | Refills: 0 | Status: SHIPPED | OUTPATIENT
Start: 2025-03-19

## 2025-03-19 NOTE — PROGRESS NOTES
Chief Complaint:   Balanitis  Urinary tract infection    HPI:   Patient was recently hospitalized secondary to hypotension and shortness of breath.  States that he had a urinary tract infection had has completed antibiotics.  Was hospitalized a [facility], unfortunately, can not see urine culture results or possible imaging. Unable to give urine sample.  States that dysuria has resolved.  Denies gross hematuria.  Reports slight itching on penile shaft, has a history of balanitis.  02/05/2025  Patient is a 84-year-old male that is presenting as a follow-up to BPH with overactive bladder.  Was prescribed VESIcare and Uroxatral, reports a complete resolution to nocturia and daytime frequency.  Denies adverse side effects to medication.  Urine in clinic indicates small leukocytes, all other parameters are negative.  PVR was 16 mL.  11/05/2024  Patient is presenting as a follow-up to VESIcare.  Myrbetriq was discontinued secondary to high co-pay, patient was prescribed VESIcare.  States that nocturia has decreased to once nightly but still continues to have urinary urge and incontinence during the day.  Urine in clinic is negative and PVR is 67 mL.  Was also prescribed tamsulosin for a dual treatment, however, had lightheadedness with tamsulosin.  08/05/2024  Patient is a 83-year-old male that was prescribed Myrbetriq for nocturia.  Patient states that Myrbetriq resolved his urinary frequency and nocturia, however, can not afford the co-pay.  Urine in clinic is negative and PVR is 14 mL.  Is requesting information on treatment for erectile dysfunction.  06/03/2024  Patient is presenting as a follow-up to urgent care.  States that he was seen in urgent care secondary to urinary tract infection, positive urine culture indicated E coli.  Patient is uncircumcised.  Urine in clinic indicates trace leukocytes, all other parameters are negative.  PVR is 40 mL.  Reports that he return to urgent care with complaints of  frequency and was prescribed Detrol 5 mg 3 times a day.  No BPH meds, tamsulosin made him feel lightheaded.  Reports that stream is strong, however, if he has not near a bathroom he will have urge incontinence.  Drinks very little water throughout the day.  11/13/2023  Patient 82-year-old male that is presenting as a follow-up for his annual exam.  Patient states that he was taken off tamsulosin secondary to feeling lightheaded.  Reports that urinary stream is strong and nocturia is once nightly.  Urine in clinic is negative and PVR was 21 mL.  No gross hematuria.  No previous elevated PSAs or prostate biopsies.  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  Recent renal ultrasound indicated a stable, simple cyst.  11/09/2022  Patient is a 81-year-old male that is presenting for his annual exam.  Patient is currently on tamsulosin, reports all BPH symptoms are resolved.  Urine in clinic is negative.  PVR is 14 mL.  Recent PSA was normal, 0.77.  No past elevated PSAs or prostate biopsies.  Patient has erectile dysfunction and reports that TriMix is working well for him.  Patient has been followed for a right renal cyst.  Had renal ultrasound today, results are pending.  Denies gross hematuria or flank pain.  11/09/2021  Patient is a 80-year-old male that is presenting to review renal ultrasound.  Patient has a history of benign, simple renal cyst.  Renal ultrasound was stable, no change to right kidney upper pole cyst measuring 1.4 cm.  No hydronephrosis or nephrolithiasis.  Patient is due for PSA level.   Wants to discuss possible treatments for ED. No BPH meds. Nocturia once nightly, no daytime LUTS.  Allergies:  Sulfa (sulfonamide antibiotics)    Medications:  has a current medication list which includes the following prescription(s): alfuzosin, allopurinol, blood glucose control, low, true metrix air glucose meter,  cyanocobalamin (vitamin b-12), diclofenac sodium, fluticasone propionate, garlic extract, lactulose, lidocaine, melatonin, metformin, mometasone 0.1%, multivitamin, pravastatin, sildenafil, solifenacin, true metrix glucose test strip, trueplus lancets, clotrimazole-betamethasone 1-0.05%, and pramipexole.    Review of Systems:  General: No fever, chills, fatigability, or weight loss.  Skin: No rashes, itching, or changes in color or texture of skin.  Chest:  Admits to shortness of breath and dizziness upon standing.  Abdomen: Appetite fine. No weight loss. Denies diarrhea, abdominal pain, hematemesis, or blood in stool.  Musculoskeletal: No joint stiffness or swelling. Denies back pain.  : As above.  All other review of systems negative.    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    PSH:   has a past surgical history that includes Rotator cuff repair (Left, 2006); Shoulder arthroscopy w/ rotator cuff repair (Right, 2009); Laminectomy (07/22/2016); Hernia repair (Bilateral, 04/18/2017); Joint replacement (Left, 10/09/2017); Back surgery (2019); lumbar foraminotomy (03/2018); left elbow (10/2017); Revision total hip arthroplasty (Left, 9/11/2018); Esophagogastroduodenoscopy (N/A, 6/30/2020); Colonoscopy (N/A, 6/30/2020); Injection of joint (Right, 4/14/2023); Transforaminal epidural  injection of steroid (Right, 10/11/2024); and Injection of joint (Right, 12/16/2024).    FamHx: family history includes Cancer (age of onset: 50) in his brother; Diabetes in his father, mother, and sister; Drug abuse in his brother; Heart disease in his father and mother; Hypertension in his father and mother; Stroke in his father.    SocHx:  reports that he has never smoked. He has never been exposed to tobacco smoke. He has never used smokeless tobacco. He reports that he does not currently use alcohol. He reports that he does not use drugs.      Physical Exam:  Vitals:    03/19/25 1524   BP: (!) 91/55   Pulse: 103   Resp: 16     General: A&Ox3, no apparent distress, no deformities  Neck: No masses, normal thyroid  Lungs: normal inspiration, no use of accessory muscles  Heart: normal pulse, no arrhythmias  Abdomen: Soft, NT, ND, no masses, no hernias, no hepatosplenomegaly  Lymphatic: Neck and groin nodes negative  Skin: The skin is warm and dry. No jaundice.  Ext: No c/c/e.  :  Slight erythema on glan    Impression/Plan:   1.Balanitis  Patient was prescribed Lotrisone cream    2. Urinary tract infection  Urine will be sent for culture, patient will be contacted with results and needed follow-up.

## 2025-03-27 ENCOUNTER — TELEPHONE (OUTPATIENT)
Dept: INTERNAL MEDICINE | Facility: CLINIC | Age: 85
End: 2025-03-27
Payer: MEDICARE

## 2025-03-27 NOTE — TELEPHONE ENCOUNTER
----- Message from Laura sent at 3/27/2025 11:53 AM CDT -----  Contact: Washington  Type:  Needs Medical AdviceWho Called: Ludin (please be specific): Pain in hip, may have blood clots How long has patient had these symptoms: Pharmacy name and phone #:  [id number] - [place], [place] - [address] AT [facility] [address] [facility] [id number] [place] [address]Xlmaz: [phone] Fax: [phone] the patient rather a call back or a response via [facility]? Call [facility] Call Back Number: [phone] Information: Need to know who he need to see about this issue?

## 2025-03-27 NOTE — TELEPHONE ENCOUNTER
Spoke with pt has been having leg pain and wants to get seen to rule out if he has a blood clot notified pt he can go to the ED for urgent evaluation, wants to be seen by tomorrow, scheduled next available.

## 2025-03-27 NOTE — TELEPHONE ENCOUNTER
----- Message from Med Assistant Samano sent at 3/27/2025  1:48 PM CDT -----  Contact: Washington  Type:  Patient Returning CallWho Called: Anamika Left Message for Patient: NurseDoes the patient know what this is regarding?: NoWould the patient rather a call back or a response via [facility]?  Jass Call Back Number: [phone] Information:

## 2025-03-28 ENCOUNTER — OFFICE VISIT (OUTPATIENT)
Dept: INTERNAL MEDICINE | Facility: CLINIC | Age: 85
End: 2025-03-28
Payer: MEDICARE

## 2025-03-28 VITALS
DIASTOLIC BLOOD PRESSURE: 82 MMHG | OXYGEN SATURATION: 99 % | BODY MASS INDEX: 25.88 KG/M2 | HEART RATE: 99 BPM | SYSTOLIC BLOOD PRESSURE: 136 MMHG | TEMPERATURE: 98 F | WEIGHT: 208.13 LBS | HEIGHT: 75 IN

## 2025-03-28 DIAGNOSIS — R22.42 LOCALIZED SWELLING OF LEFT LOWER LEG: ICD-10-CM

## 2025-03-28 DIAGNOSIS — I15.2 HYPERTENSION ASSOCIATED WITH DIABETES: ICD-10-CM

## 2025-03-28 DIAGNOSIS — M79.605 PAIN IN LEFT LEG: Primary | ICD-10-CM

## 2025-03-28 DIAGNOSIS — M25.561 CHRONIC PAIN OF RIGHT KNEE: ICD-10-CM

## 2025-03-28 DIAGNOSIS — R06.02 SHORTNESS OF BREATH: ICD-10-CM

## 2025-03-28 DIAGNOSIS — E11.59 HYPERTENSION ASSOCIATED WITH DIABETES: ICD-10-CM

## 2025-03-28 DIAGNOSIS — G89.29 CHRONIC PAIN OF RIGHT KNEE: ICD-10-CM

## 2025-03-28 PROCEDURE — 99999 PR PBB SHADOW E&M-EST. PATIENT-LVL V: CPT | Mod: PBBFAC,,, | Performed by: NURSE PRACTITIONER

## 2025-03-28 RX ORDER — DICLOFENAC SODIUM 10 MG/G
2 GEL TOPICAL 4 TIMES DAILY PRN
Qty: 200 G | Refills: 2 | Status: SHIPPED | OUTPATIENT
Start: 2025-03-28

## 2025-03-28 NOTE — PROGRESS NOTES
"Subjective:       Patient ID: Srinath Mcknight is a 84 y.o. male.  CHIEF COMPLAINT:  - Mr. Mcknight presents with multiple complaints including shortness of breath, leg swelling, and pain in various areas including the groin, knee, and feet.    HPI:  Mr. Mcknight reports shortness of breath that worsens with exertion, such as walking to and from the car. He also complains of swelling in the left leg, particularly at the ankle, which is severe enough that indentations remain when pressed. The swelling has been more severe in the past.    He describes pain in multiple areas. Pain in the right groin area is now spreading. Knee pain radiates upward. Both hips show advanced arthritis based on recent x-rays, likely contributing to the groin and hip discomfort. He is taking Tylenol 3 times daily for pain management.    He reports occasional dizziness, particularly when standing up. This symptom may be related to recent discontinuation of Flomax.    He reports sharp and intermittent pain in both feet. A podiatrist previously diagnosed this as neuropathy. He tried Gabapentin for this condition but had intolerable side effects.    He notes frequent urination, getting up 5-6 times a night to urinate, attributed to increased water intake.    He recently been evaluated by Dr. Melton, a cardiologist, who ordered some tests that have not yet been completed. He also has an upcoming appointment with a pulmonologist. He is evaluated by Dr. Leigh, an orthopedist, and has an appointment with pain management in the coming weeks.        /82 (Patient Position: Sitting)   Pulse 99   Temp 97.9 °F (36.6 °C) (Tympanic)   Ht 6' 3" (1.905 m)   Wt 94.4 kg (208 lb 1.8 oz)   SpO2 99%   BMI 26.01 kg/m²     Review of Systems   Constitutional:  Negative for fever and unexpected weight change.   HENT:  Negative for hearing loss, postnasal drip and rhinorrhea.    Eyes:  Negative for pain and visual disturbance.   Respiratory:  Positive for " shortness of breath. Negative for cough and wheezing.    Cardiovascular:  Positive for leg swelling. Negative for chest pain and palpitations.   Gastrointestinal:  Negative for constipation, diarrhea, nausea and vomiting.   Genitourinary:  Negative for dysuria and hematuria.   Musculoskeletal:  Positive for arthralgias, back pain, gait problem and myalgias. Negative for neck stiffness.   Skin:  Negative for pallor and rash.   Neurological:  Negative for seizures, syncope and headaches.   Hematological:  Negative for adenopathy.   Psychiatric/Behavioral:  Negative for dysphoric mood. The patient is not nervous/anxious.        Objective:      Physical Exam  Vitals and nursing note reviewed.   Constitutional:       General: He is not in acute distress.     Appearance: He is well-developed. He is not diaphoretic.   HENT:      Head: Normocephalic and atraumatic.   Cardiovascular:      Rate and Rhythm: Normal rate and regular rhythm.      Heart sounds: Normal heart sounds.   Pulmonary:      Effort: Pulmonary effort is normal. No respiratory distress.      Breath sounds: Normal breath sounds.   Musculoskeletal:      Left hand: Tenderness present.        Hands:       Right hip: Tenderness present. Decreased range of motion. Decreased strength.      Left hip: Tenderness present. Decreased range of motion. Decreased strength.      Left lower le+ Edema present.      Comments: Walking with walker   Skin:     General: Skin is warm and dry.      Findings: No rash.   Neurological:      Mental Status: He is alert and oriented to person, place, and time.   Psychiatric:         Behavior: Behavior normal.         Thought Content: Thought content normal.         Judgment: Judgment normal.         Assessment and Plan        Washington was seen today for leg pain.    Diagnoses and all orders for this visit:    Pain in left leg    Shortness of breath    Chronic pain of right knee  -     diclofenac sodium (VOLTAREN) 1 % Gel; Apply 2 g  topically 4 (four) times daily as needed (pain).    Localized swelling of left lower leg  -     US Lower Extremity Veins Left; Future    Hypertension associated with diabetes      There are no Patient Instructions on file for this visit.      1. Pain in left leg    2. Shortness of breath    3. Chronic pain of right knee    4. Localized swelling of left lower leg    5. Hypertension associated with diabetes        Assessment & Plan    OSTEOARTHRITIS OF HIP:  - Assessed the patient's condition, noting advanced arthritis in both hips causing groin and hip discomfort.  - Explained to the patient that arthritis is a chronic condition without a cure, with fluctuating symptoms.  - Noted that the patient's pain is getting worse and moving to the other side.  - Reviewed x-rays showing advanced arthritis in both hips.  - Prescribed Voltaren gel (diclofenac) for topical application on knees and outer hips, avoiding the groin area, to reduce inflammation.  - Recommend increasing Tylenol dosage to 2 tablets in the morning, 1 at noon, and 2 at night.  - Increased Tylenol (acetaminophen) 650 mg to 2 tablets in the morning, 1 tablet at noon, and 2 tablets at night for pain management within safe limits.  - Explained the purpose of Voltaren gel as a topical anti-inflammatory option.    POLYNEUROPATHY:  - Discussed neuropathy as a possible cause of foot pain, confirming the diagnosis based on the patient's description of symptoms.  - Noted that the patient previously tried Gabapentin for neuropathy but experienced intolerable side effects.    HYPOTENSION:  - Noted the patient's history of low blood pressure, which has now leveled out.  - Measured blood pressure, which is normal today but has been running on the lower end.  - Discussed the challenge of managing low blood pressure while addressing other symptoms.  - Advised the patient to increase sodium intake to manage low blood pressure.  - Explained that diuretics cannot be  prescribed due to low BP.  - Discussed the relationship between salt intake, fluid retention, and BP.    KIDNEY DISORDER:  - Acknowledged the patient's kidney issues and the need to avoid certain medications that could damage the kidneys.  - Educated the patient on the risks of using anti-inflammatory medications with compromised renal function.  - Recommend topical anti-inflammatory (Voltaren gel) as a safer alternative to oral medications due to kidney issues.  - Limited pain management options due to kidney issues, ruling out NSAIDs.    SHORTNESS OF BREATH:  - Assessed complaints of shortness of breath, especially with exertion.  - Noted that the patient reports some days are better than others for breathing.  - Reviewed EKG, which looked good according to doctor's assessment.  - Expressed concern about potential heart problems causing shortness of breath.  - Reviewed Dr. Melton's (cardiologist) notes.  - Noted that the patient has been referred to a cardiologist (Dr. Melton) who has ordered tests.  - Referred the patient to a pulmonologist for further evaluation.    LOCALIZED EDEMA:  - Assessed complaints of leg swelling, observing and confirming swelling and tightness in the left leg.  - Noted that the patient reports swelling in left ankle and leg, with pitting edema.  - Considered fluid retention as cause of leg swelling.  - Ordered left leg ultrasound to rule out blood clot, given unilateral swelling.  - Recommend elevating both legs to reduce swelling.  - Mr. Mcknight to elevate both legs when at home, using a recliner or couch with pillows under legs.    FOLLOW UP:  - as schedule and PRN         This note was generated with the assistance of ambient listening technology. Verbal consent was obtained by the patient and accompanying visitor(s) for the recording of patient appointment to facilitate this note. I attest to having reviewed and edited the generated note for accuracy, though some syntax or spelling  errors may persist. Please contact the author of this note for any clarification.

## 2025-03-31 ENCOUNTER — RESULTS FOLLOW-UP (OUTPATIENT)
Dept: INTERNAL MEDICINE | Facility: CLINIC | Age: 85
End: 2025-03-31
Payer: MEDICARE

## 2025-03-31 ENCOUNTER — OFFICE VISIT (OUTPATIENT)
Dept: ORTHOPEDICS | Facility: CLINIC | Age: 85
End: 2025-03-31
Payer: MEDICARE

## 2025-03-31 ENCOUNTER — HOSPITAL ENCOUNTER (OUTPATIENT)
Dept: RADIOLOGY | Facility: HOSPITAL | Age: 85
Discharge: HOME | End: 2025-03-31
Attending: NURSE PRACTITIONER
Payer: MEDICARE

## 2025-03-31 VITALS — HEIGHT: 75 IN | WEIGHT: 206.56 LBS | BODY MASS INDEX: 25.68 KG/M2

## 2025-03-31 DIAGNOSIS — M17.11 PRIMARY OSTEOARTHRITIS OF RIGHT KNEE: ICD-10-CM

## 2025-03-31 DIAGNOSIS — M16.11 ARTHRITIS OF RIGHT HIP: Primary | ICD-10-CM

## 2025-03-31 DIAGNOSIS — M96.89 POSTOPERATIVE HETEROTOPIC OSSIFICATION: ICD-10-CM

## 2025-03-31 DIAGNOSIS — M94.261 CHONDROMALACIA, RIGHT KNEE: ICD-10-CM

## 2025-03-31 DIAGNOSIS — M61.50 POSTOPERATIVE HETEROTOPIC OSSIFICATION: ICD-10-CM

## 2025-03-31 DIAGNOSIS — Z96.642 HX OF TOTAL HIP ARTHROPLASTY, LEFT: ICD-10-CM

## 2025-03-31 DIAGNOSIS — M51.369 ADJACENT SEGMENT DISEASE OF LUMBAR SPINE WITH HISTORY OF FUSION PROCEDURE: ICD-10-CM

## 2025-03-31 DIAGNOSIS — R22.42 LOCALIZED SWELLING OF LEFT LOWER LEG: ICD-10-CM

## 2025-03-31 DIAGNOSIS — M51.362 DEGENERATION OF INTERVERTEBRAL DISC OF LUMBAR REGION WITH DISCOGENIC BACK PAIN AND LOWER EXTREMITY PAIN: ICD-10-CM

## 2025-03-31 DIAGNOSIS — M17.11 PRIMARY OSTEOARTHRITIS OF RIGHT KNEE: Primary | ICD-10-CM

## 2025-03-31 DIAGNOSIS — Z98.1 ADJACENT SEGMENT DISEASE OF LUMBAR SPINE WITH HISTORY OF FUSION PROCEDURE: ICD-10-CM

## 2025-03-31 PROBLEM — E78.5 HYPERLIPIDEMIA: Status: ACTIVE | Noted: 2018-09-14

## 2025-03-31 PROBLEM — N39.0 ACUTE URINARY TRACT INFECTION: Status: ACTIVE | Noted: 2025-03-01

## 2025-03-31 PROBLEM — K56.609 SMALL BOWEL OBSTRUCTION: Status: ACTIVE | Noted: 2024-08-14

## 2025-03-31 PROBLEM — M62.50 MUSCLE ATROPHY: Status: ACTIVE | Noted: 2018-09-14

## 2025-03-31 PROBLEM — M10.9 GOUT: Status: ACTIVE | Noted: 2018-09-14

## 2025-03-31 PROBLEM — M06.9 RHEUMATOID ARTHRITIS: Status: ACTIVE | Noted: 2025-03-31

## 2025-03-31 PROBLEM — N28.9 ACUTE RENAL INSUFFICIENCY: Status: ACTIVE | Noted: 2025-03-01

## 2025-03-31 PROBLEM — E83.42 HYPOMAGNESEMIA: Status: ACTIVE | Noted: 2025-03-01

## 2025-03-31 PROBLEM — E83.51 HYPOCALCEMIA: Status: ACTIVE | Noted: 2025-03-01

## 2025-03-31 PROCEDURE — 93971 EXTREMITY STUDY: CPT | Mod: 26,LT,, | Performed by: RADIOLOGY

## 2025-03-31 PROCEDURE — 93971 EXTREMITY STUDY: CPT | Mod: TC,HCNC,LT

## 2025-03-31 PROCEDURE — 99999 PR PBB SHADOW E&M-EST. PATIENT-LVL IV: CPT | Mod: PBBFAC,HCNC,, | Performed by: ORTHOPAEDIC SURGERY

## 2025-03-31 NOTE — PROGRESS NOTES
Subjective:     Patient ID: Srinath Mcknight is a 84 y.o. male.    Chief Complaint: Pain of the Right Knee and Pain of the Right Hip    HPI:  03/31/2025  Severe right hip, right knee pain.  This is been going on for several years now.  Treatment in the past included right knee Kenalog injection 01/02/2024, right knee Orthovisc 03/01/2024 right knee Synvisc-One 04/14/2023 by Dr. CHAPARRO and right hip intra-articular injection 12/16/2024 by Dr. Sarah Benedict in [place].  The hip injection seems to have lasted 1 month he said he was without any pain in the hip and the knee pain was minimize was not bothering him as much.  He ambulates with a cane and he does have a Rollator.  He did finish physical therapy at [facility] on the [place].  History of left total hip replacement by Dr. garcia with a revision.  Review of the revision could not tell for sure what was done probably revising the femoral component.  He stated the left hip is not bothering him as much there is limitation of range of motion and you feel that the left leg is slightly shorter.  Has numbness and tingling both feet I did review MRI of the lumbar spine that did not show any impingement.  Most likely what he has is peripheral neuropathy.  He has quite a bit of issues with his heart he seeing Dr. Melton also with swelling in his left leg and he is already scheduled for ultrasound today also has other tests to be performed.  Received multiple injections in the lumbar spine by Dr. CHAPARRO 10/11/2024 right L4-5 and L5-S1 GAEL, 04/14/2023 right knee Synvisc   Today he is not having any fever or chills or any shortness of breath or chest pain or difficulty with chewing or swallowing  Past Medical History:   Diagnosis Date    Anemia due to unknown mechanism 11/10/2015    Angina pectoris 2014    not since    Arthritis     Back pain     Coronary artery disease     Diabetes mellitus with stage 3 chronic kidney disease 11/18/2020    DM (diabetes mellitus) 25-30 years    BS  109 am 05/15/2019    DM (diabetes mellitus)     BS 97 am 06/04/2021    Encounter for blood transfusion     Gout 12/05/2017    right foot     History of blood transfusion     Hyperlipemia     Hypertension     CHARLES (obstructive sleep apnea)     Polyneuropathy     Rheumatoid arthritis 3/31/2025    Rheumatoid arthritis (Problem)    Spinal cord disease     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    Trouble in sleeping     Type 2 diabetes mellitus with diabetic polyneuropathy, without long-term current use of insulin     Urinary incontinence     Uses hearing aid     bilat     Past Surgical History:   Procedure Laterality Date    BACK SURGERY  2019    COLONOSCOPY N/A 6/30/2020    Procedure: COLONOSCOPY;  Surgeon: Joseph Iraheta MD;  Location: [facility];  Service: Endoscopy;  Laterality: N/A;    ESOPHAGOGASTRODUODENOSCOPY N/A 6/30/2020    Procedure: EGD (ESOPHAGOGASTRODUODENOSCOPY);  Surgeon: Joseph Iraheta MD;  Location: [facility] ENDO;  Service: Endoscopy;  Laterality: N/A;    HERNIA REPAIR Bilateral 04/18/2017    INJECTION OF JOINT Right 4/14/2023    Procedure: right Synvisc Knee injection;  Surgeon: Hossein Brooks MD;  Location: [facility] PAIN MGT;  Service: Pain Management;  Laterality: Right;    INJECTION OF JOINT Right 12/16/2024    Procedure: RT Intra-articular hip injection;  Surgeon: Merissa Kelly DO;  [facility];  Service: Pain Management;  Laterality: Right;  oral sed-no ac    JOINT REPLACEMENT Left 10/09/2017    L YAHIR Dr. Alcocer    LAMINECTOMY  07/22/2016    left elbow  10/2017    procedure to remove gout    lumbar foraminotomy  03/2018    REVISION TOTAL HIP ARTHROPLASTY Left 9/11/2018    Procedure: REVISION, TOTAL ARTHROPLASTY, HIP;  Surgeon: Albaro Alcocer Sr., MD;  Location: [facility] OR;  Service: Orthopedics;  Laterality: Left;    ROTATOR CUFF REPAIR Left 2006    SHOULDER ARTHROSCOPY W/ ROTATOR CUFF REPAIR Right 2009    TRANSFORAMINAL EPIDURAL INJECTION OF STEROID  Right 10/11/2024    Procedure: Right L4/5 + L5/S1 TF GAEL;  Surgeon: Hossein Brooks MD;  Location: [facility] PAIN MGT;  Service: Pain Management;  Laterality: Right;     Family History   Problem Relation Name Age of Onset    Hypertension Mother      Heart disease Mother      Diabetes Mother      Hypertension Father      Heart disease Father      Diabetes Father      Stroke Father      Diabetes Sister      Cancer Brother  50        pancreas    Drug abuse Brother      Prostate cancer Neg Hx      Macular degeneration Neg Hx      Retinal detachment Neg Hx      Strabismus Neg Hx      Glaucoma Neg Hx      Blindness Neg Hx      Amblyopia Neg Hx      Kidney disease Neg Hx      Mental illness Neg Hx      Intellectual disability Neg Hx      COPD Neg Hx      Asthma Neg Hx       Social History[1]  Medication List with Changes/Refills   Current Medications    ALFUZOSIN (UROXATRAL) 10 MG TB24    Take 1 tablet (10 mg total) by mouth daily with breakfast.    ALLOPURINOL (ZYLOPRIM) 100 MG TABLET    TAKE 1 TABLET EVERY DAY    BLOOD GLUCOSE CONTROL, LOW SOLN    by Misc.(Non-Drug; Combo Route) route.    BLOOD-GLUCOSE METER (TRUE METRIX AIR GLUCOSE METER) KIT    USE AS DIRECTED    CLOTRIMAZOLE-BETAMETHASONE 1-0.05% (LOTRISONE) CREAM    Apply topically 2 (two) times daily.    CYANOCOBALAMIN, VITAMIN B-12, 1,000 MCG SUBL    Place 1,000 mcg under the tongue once daily.    DICLOFENAC SODIUM (VOLTAREN) 1 % GEL    Apply 2 g topically 4 (four) times daily as needed (pain).    FLUTICASONE PROPIONATE (FLONASE) 50 MCG/ACTUATION NASAL SPRAY    1 spray (50 mcg total) by Each Nostril route once daily.    GARLIC EXTRACT ORAL    Take 1 tablet by mouth once daily. Per [facility]    LACTULOSE (CHRONULAC) 10 GRAM/15 ML SOLUTION    SMARTSI Milliliter(s) By Mouth Daily    LIDOCAINE (LIDODERM) 5 %    Place 1 patch onto the skin daily as needed (pain). Remove & Discard patch within 12 hours or as directed by MD    MELATONIN 10 MG TAB    Take 1.5  tablets (15 mg total) by mouth every evening.    METFORMIN (GLUCOPHAGE) 1000 MG TABLET    TAKE 1 TABLET TWICE DAILY WITH MEALS    MOMETASONE 0.1% (ELOCON) 0.1 % CREAM    Apply topically once daily.    MULTIVITAMIN (THERAGRAN) PER TABLET    Take 1 tablet by mouth once daily.    PRAMIPEXOLE (MIRAPEX) 0.125 MG TABLET    Take 1 tablet (0.125 mg total) by mouth every evening.    PRAVASTATIN (PRAVACHOL) 40 MG TABLET    TAKE 1 TABLET EVERY DAY    SILDENAFIL (VIAGRA) 100 MG TABLET    Take 1 tablet (100 mg total) by mouth daily as needed for Erectile Dysfunction.    SOLIFENACIN (VESICARE) 10 MG TABLET    Take 1 tablet (10 mg total) by mouth once daily.    TRUE METRIX GLUCOSE TEST STRIP STRP    USE AS DIRECTED TO CHECK SUGARS    TRUEPLUS LANCETS 33 GAUGE MISC    USE AS DIRECTED TO CHECK SUGARS     Review of patient's allergies indicates:   Allergen Reactions    Sulfa (sulfonamide antibiotics)      Can't recall from 1995     Review of Systems   Constitutional: Negative for decreased appetite.   HENT:  Negative for tinnitus.    Eyes:  Negative for double vision.   Cardiovascular:  Negative for chest pain.   Respiratory:  Negative for wheezing.    Hematologic/Lymphatic: Negative for bleeding problem.   Skin:  Negative for dry skin.   Musculoskeletal:  Positive for arthritis, back pain, joint pain, muscle weakness and stiffness. Negative for gout and neck pain.   Gastrointestinal:  Negative for abdominal pain.   Genitourinary:  Negative for bladder incontinence.   Neurological:  Negative for numbness, paresthesias and sensory change.   Psychiatric/Behavioral:  Negative for altered mental status.        Objective:   Body mass index is 25.82 kg/m².  There were no vitals filed for this visit.       General    Constitutional: He is oriented to person, place, and time. He appears well-developed.   HENT:   Head: Atraumatic.   Eyes: EOM are normal.   Pulmonary/Chest: Effort normal.   Neurological: He is alert and oriented to person,  place, and time.   Psychiatric: Judgment normal.           Ambulating with a cane slightly hunched over  Pelvis is level in the sitting position  Right hip with around 15° of flexion contracture.  Internal rotation 15° external rotation 25-30 degrees with pain in the groin with pelvis tilting.  Hip flexors and abductors are slightly weak at 4+ over 5.  Abduction of the hip 45°.  There is stiffness and pelvis tilting with exam  Left hip internal rotation around 5° external rotation 35°.  Weakness with hip flexors and abductors at 5 minus/5..  In the sitting position he has around 1-1/2 cm leg-length discrepancy with knee to knee  Right knee no joint pain to palpation.  There is mild anterior joint tenderness.  Full range of motion.  Collaterals and cruciates are stable.  Could not elicit a Sincere's.  Mild crepitus with range of motion with palpation on the patella.  Negative apprehension sign to the patella.  No defect to the patella tendon or quadriceps tendon.  No swelling.  Full range of motion.  Calves are soft nontender   Left leg with pitting edema up to mid tibia and the right ankle up to the ankle joint   Able to move the ankle up and down.    Relevant imaging results reviewed and interpreted by me, discussed with the patient and / or family today     X-ray 11/13/2024 of the pelvis and hip showing left hip replacement with femoral component looks like has settled down to the lesser troch.  There is heterotopic ossification over the gluteus.  As far as the right hip there is multiple cystic changes in the femoral head with loss of the medial joint space and inferior acetabular osteophyte consistent with severe arthritis.  On the pelvis you can see some hardware in the lumbar spine at L3-L4/pedicle screws  X-ray of the knees showing good joint space without narrowing.  Bilateral knees without evidence of loss of joint space.  No fracture seen.  MRI of the lumbar spine had showed some degenerative changes but  no impingement on nerve roots.  He had previous fusion L3-L4 with hardware  Assessment:     Encounter Diagnoses   Name Primary?    Arthritis of right hip Yes    Hx of total hip arthroplasty, left     Postoperative heterotopic ossification     Degeneration of intervertebral disc of lumbar region with discogenic back pain and lower extremity pain     Chondromalacia, right knee     Adjacent segment disease of lumbar spine with history of fusion procedure     Primary osteoarthritis of right knee         Plan:   Arthritis of right hip    Hx of total hip arthroplasty, left    Postoperative heterotopic ossification    Degeneration of intervertebral disc of lumbar region with discogenic back pain and lower extremity pain    Chondromalacia, right knee    Adjacent segment disease of lumbar spine with history of fusion procedure    Primary osteoarthritis of right knee  -     Ambulatory referral/consult to Orthopedics         Patient Instructions   Your x-ray show you have severe arthritis of the right hip   Your main complaint is right hip and right knee pain   You received a intra-articular injection by pain management in November 2024 and help you for 1 month and your knee pain had subsided after the injection of the hip   Your x-ray show that the right knee is without any evidence of arthritis by the x-ray and you joint spaces very well maintained  I believe your knee pain is referred from the hip joint  We will need to get MRI on your right knee to make sure that we are not missing mechanical issues in the right knee causing you your pain.  If the MRI is negative most likely your pain is coming from the hip  You have numerous medical issues right now you seeing Dr. Tolentino in cardiology  To have a total hip replacement need to be cleared by Cardiology prior to undergoing any hip replacement   The left hip required you have 2 operations in 2018 and you have some stiffness and leg-length discrepancy on that side with it  being slightly short but you doing okay with it.  There is some bone overgrowth which we call heterotopic bone that had formed and limits the motion.  There is no reason to go after that at this time.  Your femoral component on the left side look like has settled down a little bit creating slight leg-length discrepancy   Continue using your cane   I will need to obtain MRI on your right knee   You do have a cane and a Rollator at home  You already been through physical therapy at the [facility]  I will see you after we get the MRI in the right knee  We will give you a brochure about total hip replacement.  It is considered outpatient surgery but you can stay overnight we call that extended recovery.  This is government issue that it is considered outpatient surgery.  I will go into details with it when time comes you have to be cleared by Dr. Melton before you can undergo total hip replacement.    Refer you to pain management Dr. ONTIVEROS who gave you injections in the past in you back to inject her right hip so you do not have to drive to [place]        Disclaimer: This note was prepared using a voice recognition system and is likely to have sound alike errors within the text.          [1]   Social History  Socioeconomic History    Marital status:     Number of children: 0    Highest education level: Master's degree (e.g., MA, MS, Olegario, MEd, MSW, CARMEL)   Occupational History    Occupation: retired     Comment: teacher   Tobacco Use    Smoking status: Never     Passive exposure: Never    Smokeless tobacco: Never   Substance and Sexual Activity    Alcohol use: Not Currently    Drug use: No    Sexual activity: Not Currently     Partners: Female   Social History Narrative    . No children. Retired but some part time work - 4 hours a day. 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 Caffeine intake - sugar free cola, Rare coffee intake. Still drives. Does have a Living Will . Has a nephew Jt Gayle, lives in [place]. Has  a friend Karina Rossi, locally who could help him.      Social Drivers of Health     Financial Resource Strain: Medium Risk (1/23/2025)    Overall Financial Resource Strain (CARDIA)     Difficulty of Paying Living Expenses: Somewhat hard   Food Insecurity: No Food Insecurity (1/23/2025)    Hunger Vital Sign     Worried About Running Out of Food in the Last Year: Never true     Ran Out of Food in the Last Year: Never true   Transportation Needs: No Transportation Needs (8/15/2023)    PRAPARE - Transportation     Lack of Transportation (Medical): No     Lack of Transportation (Non-Medical): No   Physical Activity: Inactive (1/23/2025)    Exercise Vital Sign     Days of Exercise per Week: 0 days     Minutes of Exercise per Session: 10 min   Stress: Stress Concern Present (1/23/2025)    [place] [employer] - Occupational Stress Questionnaire     Feeling of Stress : Very much   Housing Stability: Low Risk  (8/15/2023)    Housing Stability Vital Sign     Unable to Pay for Housing in the Last Year: No     Number of Places Lived in the Last Year: 1     Unstable Housing in the Last Year: No

## 2025-03-31 NOTE — PATIENT INSTRUCTIONS
Your x-ray show you have severe arthritis of the right hip   Your main complaint is right hip and right knee pain   You received a intra-articular injection by pain management in November 2024 and help you for 1 month and your knee pain had subsided after the injection of the hip   Your x-ray show that the right knee is without any evidence of arthritis by the x-ray and you joint spaces very well maintained  I believe your knee pain is referred from the hip joint  We will need to get MRI on your right knee to make sure that we are not missing mechanical issues in the right knee causing you your pain.  If the MRI is negative most likely your pain is coming from the hip  You have numerous medical issues right now you seeing Dr. Tolentino in cardiology  To have a total hip replacement need to be cleared by Cardiology prior to undergoing any hip replacement   The left hip required you have 2 operations in 2018 and you have some stiffness and leg-length discrepancy on that side with it being slightly short but you doing okay with it.  There is some bone overgrowth which we call heterotopic bone that had formed and limits the motion.  There is no reason to go after that at this time.  Your femoral component on the left side look like has settled down a little bit creating slight leg-length discrepancy   Continue using your cane   I will need to obtain MRI on your right knee   You do have a cane and a Rollator at home  You already been through physical therapy at the [facility]  I will see you after we get the MRI in the right knee  We will give you a brochure about total hip replacement.  It is considered outpatient surgery but you can stay overnight we call that extended recovery.  This is government issue that it is considered outpatient surgery.  I will go into details with it when time comes you have to be cleared by Dr. Melton before you can undergo total hip replacement.    Refer you to pain management Dr. ONTIVEROS who gave  you injections in the past in you back to inject her right hip so you do not have to drive to [place]

## 2025-04-02 ENCOUNTER — HOSPITAL ENCOUNTER (OUTPATIENT)
Dept: CARDIOLOGY | Facility: HOSPITAL | Age: 85
Discharge: HOME | End: 2025-04-02
Attending: INTERNAL MEDICINE
Payer: MEDICARE

## 2025-04-02 VITALS
SYSTOLIC BLOOD PRESSURE: 136 MMHG | DIASTOLIC BLOOD PRESSURE: 82 MMHG | WEIGHT: 206 LBS | HEIGHT: 75 IN | BODY MASS INDEX: 25.61 KG/M2

## 2025-04-02 DIAGNOSIS — R09.89 CAROTID BRUIT, UNSPECIFIED LATERALITY: ICD-10-CM

## 2025-04-02 DIAGNOSIS — R42 DIZZINESS: ICD-10-CM

## 2025-04-02 LAB
LEFT ARM DIASTOLIC BLOOD PRESSURE: 80 MMHG
LEFT ARM SYSTOLIC BLOOD PRESSURE: 130 MMHG
LEFT CBA DIAS: 15 CM/S
LEFT CBA SYS: 77 CM/S
LEFT CCA DIST DIAS: 17 CM/S
LEFT CCA DIST SYS: 93 CM/S
LEFT CCA MID DIAS: 16 CM/S
LEFT CCA MID SYS: 105 CM/S
LEFT CCA PROX DIAS: 16 CM/S
LEFT CCA PROX SYS: 119 CM/S
LEFT ECA DIAS: 15 CM/S
LEFT ECA SYS: 75 CM/S
LEFT ICA DIST DIAS: 13 CM/S
LEFT ICA DIST SYS: 40 CM/S
LEFT ICA MID DIAS: 18 CM/S
LEFT ICA MID SYS: 60 CM/S
LEFT ICA PROX DIAS: 12 CM/S
LEFT ICA PROX SYS: 53 CM/S
LEFT VERTEBRAL DIAS: 7 CM/S
LEFT VERTEBRAL SYS: 50 CM/S
OHS CV CAROTID RIGHT ICA EDV HIGHEST: 25
OHS CV CAROTID ULTRASOUND LEFT ICA/CCA RATIO: 0.65
OHS CV CAROTID ULTRASOUND RIGHT ICA/CCA RATIO: 0.92
OHS CV PV CAROTID LEFT HIGHEST CCA: 119
OHS CV PV CAROTID LEFT HIGHEST ICA: 60
OHS CV PV CAROTID RIGHT HIGHEST CCA: 117
OHS CV PV CAROTID RIGHT HIGHEST ICA: 73
OHS CV US CAROTID LEFT HIGHEST EDV: 18
RIGHT ARM DIASTOLIC BLOOD PRESSURE: 82 MMHG
RIGHT ARM SYSTOLIC BLOOD PRESSURE: 136 MMHG
RIGHT CBA DIAS: 12 CM/S
RIGHT CBA SYS: 62 CM/S
RIGHT CCA DIST DIAS: 17 CM/S
RIGHT CCA DIST SYS: 79 CM/S
RIGHT CCA MID DIAS: 20 CM/S
RIGHT CCA MID SYS: 117 CM/S
RIGHT CCA PROX DIAS: 16 CM/S
RIGHT CCA PROX SYS: 106 CM/S
RIGHT ECA DIAS: 7 CM/S
RIGHT ECA SYS: 70 CM/S
RIGHT ICA DIST DIAS: 25 CM/S
RIGHT ICA DIST SYS: 73 CM/S
RIGHT ICA MID DIAS: 16 CM/S
RIGHT ICA MID SYS: 52 CM/S
RIGHT ICA PROX DIAS: 13 CM/S
RIGHT ICA PROX SYS: 69 CM/S
RIGHT VERTEBRAL DIAS: 14 CM/S
RIGHT VERTEBRAL SYS: 46 CM/S

## 2025-04-02 PROCEDURE — 93880 EXTRACRANIAL BILAT STUDY: CPT

## 2025-04-02 PROCEDURE — 93880 EXTRACRANIAL BILAT STUDY: CPT | Mod: 26,,, | Performed by: INTERNAL MEDICINE

## 2025-04-03 ENCOUNTER — RESULTS FOLLOW-UP (OUTPATIENT)
Dept: CARDIOLOGY | Facility: HOSPITAL | Age: 85
End: 2025-04-03

## 2025-04-07 DIAGNOSIS — G47.61 PLMD (PERIODIC LIMB MOVEMENT DISORDER): Chronic | ICD-10-CM

## 2025-04-07 RX ORDER — PRAMIPEXOLE DIHYDROCHLORIDE 0.12 MG/1
0.12 TABLET ORAL NIGHTLY
Qty: 90 TABLET | Refills: 3 | Status: SHIPPED | OUTPATIENT
Start: 2025-04-07

## 2025-04-08 ENCOUNTER — TELEPHONE (OUTPATIENT)
Dept: PAIN MEDICINE | Facility: CLINIC | Age: 85
End: 2025-04-08
Payer: MEDICARE

## 2025-04-08 NOTE — TELEPHONE ENCOUNTER
Called patient to confirm appointment. Unable to confirm appt. Left VM for patient to call back.

## 2025-04-09 ENCOUNTER — OFFICE VISIT (OUTPATIENT)
Dept: PAIN MEDICINE | Facility: CLINIC | Age: 85
End: 2025-04-09
Payer: MEDICARE

## 2025-04-09 VITALS
HEIGHT: 75 IN | BODY MASS INDEX: 26.01 KG/M2 | RESPIRATION RATE: 17 BRPM | DIASTOLIC BLOOD PRESSURE: 76 MMHG | HEART RATE: 84 BPM | SYSTOLIC BLOOD PRESSURE: 140 MMHG | WEIGHT: 209.19 LBS

## 2025-04-09 DIAGNOSIS — Z98.890 HISTORY OF LUMBAR LAMINECTOMY: ICD-10-CM

## 2025-04-09 DIAGNOSIS — Z98.1 HISTORY OF LUMBAR FUSION: ICD-10-CM

## 2025-04-09 DIAGNOSIS — M47.816 LUMBAR SPONDYLOSIS: ICD-10-CM

## 2025-04-09 DIAGNOSIS — Z96.642 HISTORY OF LEFT HIP REPLACEMENT: ICD-10-CM

## 2025-04-09 DIAGNOSIS — M17.11 PRIMARY OSTEOARTHRITIS OF RIGHT KNEE: ICD-10-CM

## 2025-04-09 DIAGNOSIS — M16.11 PRIMARY OSTEOARTHRITIS OF RIGHT HIP: Primary | ICD-10-CM

## 2025-04-09 PROCEDURE — 99999 PR PBB SHADOW E&M-EST. PATIENT-LVL IV: CPT | Mod: PBBFAC,,, | Performed by: PHYSICIAN ASSISTANT

## 2025-04-09 RX ORDER — NAPROXEN SODIUM 220 MG/1
81 TABLET, FILM COATED ORAL DAILY
COMMUNITY

## 2025-04-09 RX ORDER — KETOROLAC TROMETHAMINE 30 MG/ML
30 INJECTION, SOLUTION INTRAMUSCULAR; INTRAVENOUS
Status: COMPLETED | OUTPATIENT
Start: 2025-04-09 | End: 2025-04-09

## 2025-04-09 RX ADMIN — KETOROLAC TROMETHAMINE 30 MG: 30 INJECTION, SOLUTION INTRAMUSCULAR; INTRAVENOUS at 11:04

## 2025-04-09 NOTE — H&P (VIEW-ONLY)
Established Patient Chronic Pain Note (Follow-up Visit)    Referring Physician: Yeison Wilder MD    PCP: Yeison Wilder MD      Chief complaint:  Hip Pain (right), thigh (right), and Knee Pain (right)     low back pain with RLE radicular pain (now bilateral)  Right knee pain   Right hip pain, occasional left hip pain (h/o left replacement)              Interval History (4/9/2025):  Srinath Mcknight presents today for follow-up visit.  Patient was last seen on 1/8/2025. At that visit, the plan was to continue Lyrica, which he is no longer taking -- due to SE of drowsiness, hallucinations. Patient reports pain as 10/10 today.  Since last visit, he has seen Dr. Schuler (HPI below). He is scheduled for upcoming knee MRI and will follow-up with him after.     HPI:  03/31/2025  Severe right hip, right knee pain.  This is been going on for several years now.  Treatment in the past included right knee Kenalog injection 01/02/2024, right knee Orthovisc 03/01/2024 right knee Synvisc-One 04/14/2023 by Dr. CHAPARRO and right hip intra-articular injection 12/16/2024 by Dr. Sarah Benedict in [place].  The hip injection seems to have lasted 1 month he said he was without any pain in the hip and the knee pain was minimize was not bothering him as much.  He ambulates with a cane and he does have a Rollator.  He did finish physical therapy at [facility] [place].  History of left total hip replacement by Dr. garcia with a revision.  Review of the revision could not tell for sure what was done probably revising the femoral component.  He stated the left hip is not bothering him as much there is limitation of range of motion and you feel that the left leg is slightly shorter.  Has numbness and tingling both feet I did review MRI of the lumbar spine that did not show any impingement.  Most likely what he has is peripheral neuropathy.  He has quite a bit of issues with his heart he seeing Dr. Melton also with swelling in his left  "leg and he is already scheduled for ultrasound today also has other tests to be performed.  Received multiple injections in the lumbar spine by Dr. CHAPARRO 10/11/2024 right L4-5 and L5-S1 GAEL, 04/14/2023 right knee Synvisc   Today he is not having any fever or chills or any shortness of breath or chest pain or difficulty with chewing or swallowing    Interval History (1/8/2025):  Patient presents today for follow-up visit.  Patient was last seen on 11/6/2024 by our clinic but has seen seen Orthopedics in [place], then referred to pain mgmt there.  At the last visit with me, patient was started on Lyrica.  Patient reports pain as 5/10 today.  S/p Right Intra-articular hip injection on 12/16/2024 with Dr. Merissa Kelly with some pain relief, maybe 75-80% pain relief.  Today, he complains of right knee and low back pain. He reports no pain relief with previous lumbar injection or knee injection (visco and steroid).   At last visit, he was started on Lyrica. He was unsure what medication is causing light headedness and risk for falls. He reports he stopped taking Klonopin because of this, which he feels was the cause of the dizziness/ confusion. He reports he "jumps up at night seeing things" some nights. He reports he has been off of the Klonopin for about 3 weeks.     11/15/2024 - Dr. Kelly (Pain Mgmt): Srinath Mcknight is a 83 y.o. male who presents complaining of right hip pain that radiates into the right thigh and right knee. Patient was referred by Dr. Wall (Orthopedics).   He recently underwent right L4-L5 and L5-S1 transforaminal epidural steroid injection with little to no relief. He was recently evaluated by Orthopedics and found to have severe osteoarthritis in the right hip.  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  Orthopedics recommending right intra-articular hip injection.  He does have a history of total hip replacement on the left. Patient was previously following at the [facility] pain management " clinic.   11/13/24 - Dr. Wall (Ortho): Patient presents with multiple pain complaints affecting his lower back, both hips, both thighs, and right knee, with symptoms persisting for about 3-4 years. His right knee is worse than the left. He expresses frustration with previous medical care, stating that he has been going in circles when describing his experience with different specialists. He uses two canes for walking due to balance issues, explaining that he tends to fall in the direction he's leaning without the support. He denies feeling dizzy or lightheaded when unsteady.  He has a history of hip replacement surgery, which required two procedures on the same hip. He has also undergone back surgeries, with two performed at the spine clinic in [place] and the first one in 2017.  Recently, he has been experiencing slurred speech for the last two weeks or more. His wife notes that he sleeps excessively.  He reports pain and burning sensations in his feet, especially at night. He describes his knee pain as feeling like sharp, pin sensations at night, stating that he has to hold it as it won't straighten out. He also experiences pain in the groin area when walking.  He has previously received injections for pain management but reports dissatisfaction with his current pain management care in [place], stating that pain management has indicated they have exhausted their treatment options.  Regarding his back pain, he experiences discomfort when getting up and walking. He has tried using a brace for support but found it ineffective, stating that it does not provide adequate support for his upper abdominal area.  He denies feeling dizzy or lightheaded when unsteady. He denies having Parkinson's disease.    Interval History (11/6/2024): Srinath Mcknight presents today for follow-up visit.  he underwent right L4/5 + L5/S1 TF GAEL on 10/11/24.  The patient reports that he is/was slightly better following the procedure.  "   Patient reports pain as 7/10 today.  He is having hip and knee pain as well.  He stopped taking Lyrica when he ran out of the prescription.  He does not want to do physical therapy because he can do it on his own.  He has not seen Neurosurgery in over 7 months per reporting.    Interval History (9/25/2024):  Patient presents today for follow-up visit to review lumbar MRI results.   Patient reports pain as 5/10 today.  Continues to have right leg pain along the L4 dermatome.  He feels that he has been told that the pain is generating from different sources.  He was told in the past that he would require a right hip replacement.  He has a history of left hip replacement and has no pain on that side.  He also has right knee pain, which did not improve with injections.  He also reports that he was told in the past that 1 leg is shorter than the other, which she uses a leg lift insert for; he does feel this helps some.  He finished physical therapy 2 months ago.    Interval history 08/01/2024  Srinath Mcknight is a 83 y.o. male with past medical history significant for restrictive lung disease, hyperlipidemia, hypertension, type 2 diabetes complicated by peripheral angiopathy and nonproliferative retinopathy, stage 3 chronic kidney disease who presents to the clinic for the evaluation of lower back and leg pain. Today he reports pain is constant and is rated a 10/10.  Of note patient has history of prior L3-4 lumbar fusion and L4-5 lumbar laminectomy with Dr. Iqbal.  Today reports pain in the lower back which can radiate down the anterior aspects of bilateral lower extremities in L3-5 distribution to the knee.  Pain feels like a ball" in the thigh.  Pain is worse on the right than on the left.  Pain is exacerbated with positional changes moving from sitting to standing and with standing and with ambulation.  Patient is able to ambulate with assistive device, cane only a few minutes before requiring rest.  He does " endorse associated weakness in the lower extremities associated with his pain.  Pain is improved with sitting.  Of note patient has continued Lyrica 75 mg twice daily but he is unsure of its degree of benefit.  He reports cumbersome polypharmacy and is unsure which medications are effective. Patient has continued conventional land-based physical therapy from 05/03/2024, 07/03/2024, twice weekly sessions--13 sessions total.  He reports physical therapy is beneficial only while he is actively in the session.  Patient has plans to join a gym for exercise.  He also reports longstanding right knee pain.  Patient reports receiving corticosteroid injection and series of 3 viscosupplementation with Dr. Winters (1-3/2024) without relief exceeding 1 day.  Patient has not been evaluated for surgical intervention but secondary to history of prior numerous surgeries, he would like to avoid surgical intervention if possible.    Patient reports significant motor weakness and loss of sensations.  Patient denies night fever/night sweats, urinary incontinence, bowel incontinence, and significant weight loss.        Pain Disability Index (PDI) Score Review:      4/9/2025    10:18 AM 1/8/2025     1:07 PM 11/15/2024     2:48 PM   Last 3 PDI Scores   Pain Disability Index (PDI) 60 35 47       Non-Pharmacologic Treatments:  Physical Therapy/Home Exercise: yes  Ice/Heat:yes  TENS: no  Acupuncture: no  Massage: yes  Chiropractic: no    Relevant sx:  -Dr. Alcocer: L YAHIR 08/15/2017, L hip arthroplasty resection 09/04/2018  -Dr. Iqbal: posterior lumbar fusion      Pain Medications:  - Adjuvant Medications: Lyrica ( Pregabalin) and Topical Ointment (Voltaren Gel, Steroid cream, Anti-Inflammatory Cream, Compound cream)      Pain Procedures:     Dr. Merissa Kelly:  -12/16/2024: Right Intra-articular hip injection with 75-80% pain relief    Dr. Brooks:  -04/14/2023:  right knee IA Synvisc injection  -10/11/2024: right L4/5 + L5/S1 TF GAEL      Dr. Roldan:  -02/15/2018:  Left-sided SI joint injection  -01/24/2018: Left-sided L4-5 and L5-S1 transforaminal epidural steroid injection    Dr. Hutchinson:  -06/14/2016: Left-sided L3-4 facet injection and cyst aspiration    Dr. Madera:  -05/18/2016:  Left hip intra-articular joint injection    Dr. Douglas:  -09/06/2023: Right radiocarpal injection    Dr. Winters:  -02/16/2024, 02/23/2024, 03/01/2024: Right knee intra-articular joint injection with Orthovisc  -01/02/2024: Right knee intra-articular joint injection with triamcinolone  -11/09/2022: Right knee intra-articular joint injection with triamcinolone  -11/10/2021: Right knee intra-articular joint injection with triamcinolone      Interval History (8/16/2023):   Patient presents today for follow-up visit.  Patient was last seen on 7/17/2023. At that visit, the plan was to  Schedule right genicular nerve block, but it was not approved with insurance. Patient reports pain as 7/10 today.  He continues to have chronic knee pain.  He has failed knee steroid injections with Dr. Winters.  Also reports that he has had viscosupplementation in the past with limited pain relief.  He does not believe that he is a surgical candidate.    Interval History (7/17/2023):  Srinath Mcknight presents today for follow-up visit.  Patient was last seen on 5/11/2023. Pain in right knee has returned; it has only been about 3 months since viscosupplementation. Patient reports pain as 7/10 today.  He has been going to the gym.  He also reports right hand pain.  He saw Dr. Douglas in the past and had an injection, and the pain has returned.    Interval History (5/11/2023): Srinath Mcknight presents today for follow-up visit.  he underwent right knee Synvisc-One injection on 4/14/23 (1 month ago).  The patient reports that he is/was better following the procedure.  he reports 50% pain relief.     The changes have continued through this visit.  Patient reports pain as 8/10 today.  His  main complaint today is right groin pain.  He has never had any prior treatment of his right hip in the past.    Interval History (3/23/2023):  Srinath Mcknight presents today for follow-up visit.  Patient was last seen on 2/16/2023. At that visit, the plan was to start Lyrica, only talking QHS, which seems to be helping. Patient reports pain as 6/10 today.  His main complaint today is right knee pain.  He had a right knee steroid injection with Dr. Winters on 11/09/2022 with about 3-4 months pain relief.  This was the 1st treatment he has had for his right knee in the past.  Does not have any left knee pain.  Continues to do at home physical therapy exercises, which she feels is helping his back pain.  Overall, stable; pain is better controlled than it was at the last visit.    Initial HPI (2/16/2023):  Srinath Mcknight is a 82 y.o. male with past medical history significant for hypertension, hyperlipidemia, type 2 diabetes, coronary artery disease, urinary incontinence, sickle cell trait, stage 3 chronic kidney disease, gout, obstructive sleep apnea, history of L3-4 lumbar fusion and L4-5 lumbar laminectomy who presents to the clinic for the evaluation of lower back pain.  Of note patient reports 3 prior back surgeries:  The initial several years prior in the left lower back at [facility], and 2 lower back surgeries with Dr. Iqbal within the last 2 years.  Today patient reports pain which is constant which is rated an 8/5.  Pain is described as aching in nature.  Patient reports pain in a bandlike distribution in the lower back without radiation into the buttocks or lower extremities.  Patient does report chronic right-sided knee pain and history of total left hip arthroplasty.  Pain is exacerbated 1st thing in the morning when arising, with lumbar extension as well as with ambulation.  Patient reports he is able to ambulate only a few steps before requiring rest.  Pain is improved with lumbar support  as well as sitting.  Patient also reports receiving a lumbar epidural steroid injection at the back and spine [place] without any meaningful relief.  Patient has completed conventional physical therapy last year and continues physician directed physical therapy exercises at home without meaningful relief.  Patient has been taking gabapentin 600 mg once daily without improvement in his pain.  Patient does endorse weakness in the lower extremities associated with his pain.  Patient reports significant motor weakness.  Patient denies night fever/night sweats, urinary incontinence, bowel incontinence, significant weight loss, and loss of sensations.      Pain Disability Index Review:      4/9/2025    10:18 AM 1/8/2025     1:07 PM 11/15/2024     2:48 PM   Last 3 PDI Scores   Pain Disability Index (PDI) 60 35 47       Non-Pharmacologic Treatments:  Physical Therapy/Home Exercise: yes  Ice/Heat:no  TENS: no  Acupuncture: no  Massage: no  Chiropractic: no    Other: yes; sx x 3: [facility], Dr. Iqbal x 2      Pain Medications:  - Opioids: Percocet (Oxycodone/Acetaminophen)  - Adjuvant Medications: Clonazepam (Klonopin), Neurontin (Gabapentin), and Prednisone (Deltasone)    Relevant sx:  - 3 prior back surgeries:  1st [facility], and 2 lower back surgeries with Dr. Iqbal (March 2018: L3-L4 L5 decompression/laminotomy/micro dissection; August 2019: L3-4 revision with cage insertion and laminectomy/diskectomy)  - total left hip arthroplasty (total of 2 surgeries)      Pain Procedures:     Other providers:  -cervical medial branch facet injections in 2020 with Dr. Linares with subsequent RFA  -02/15/2018: Left-sided sacroiliac joint injection; Dr. Roldan  -01/24/2018: Left-sided L4/5 and L5/S1 transforaminal epidural steroid injection; Dr. Roldan  -06/14/2016: Left L3-4 facet joint injection; Dr. Hutchinson  -05/18/2016: Left hip intra-articular injection posterior approach; Dr. Madera    Orthopedics:  -right  "knee steroid injection with Dr. Winters on 11/09/2022 with about 3-4 months pain relief  -right wrist injection in May 2021 with Dr. Douglas       Review of Systems:  GENERAL:  No weight loss, malaise or fevers.  HEENT:   No recent changes in vision or hearing  NECK:  Negative for lumps, no difficulty with swallowing.  RESPIRATORY:  Negative for cough, wheezing or shortness of breath, patient denies any recent URI.  CARDIOVASCULAR:  Negative for chest pain or palpitations.  GI:  Negative for abdominal discomfort, blood in stools or black stools or change in bowel habits.  MUSCULOSKELETAL:  See HPI.  SKIN:  Negative for lesions, rash, and itching.  PSYCH:  No mood disorder or recent psychosocial stressors.   HEMATOLOGY/LYMPHOLOGY:  Negative for prolonged bleeding, bruising easily or swollen nodes.    NEURO:   No history of syncope, paralysis, seizures or tremors.  All other reviewed and negative other than HPI.        OBJECTIVE:    Physical Exam:  Vitals:    04/09/25 1020   BP: (!) 140/76   Pulse: 84   Resp: 17   Weight: 94.9 kg (209 lb 3.5 oz)   Height: 6' 3" (1.905 m)   PainSc: 10-Worst pain ever   PainLoc: Hip   Body mass index is 26.15 kg/m².   (reviewed on 4/9/2025)    GENERAL: Well appearing, in no acute distress, alert and oriented x3.  PSYCH:  Mood and affect appropriate.  SKIN: Skin color, texture, turgor normal, no rashes or lesions.  HEAD/FACE:  Normocephalic, atraumatic.    PULM: No evidence of respiratory difficulty, symmetric chest rise.       BACK:  Positive shopping cart sign.  Well-healed 5 in lower lumbar vertical incision.  SLR is Equivocal to radicular pain on right. No pain to palpation over the facet joints of the lumbar spine or spinous processes.  Reduced range of motion with pain reproduction.  EXTREMITIES:  Peripheral joint range of motion is reduced with pain with instability noted bilateral lower extremities. No deformities, edema, or skin discoloration. Right wrist: TTP over medial " wrist.  MUSCULOSKELETAL: Able to stand on heels but not toes secondary to history of broken toes.     There is no pain with palpation over the sacroiliac joints bilaterally.  Gaenslen's, Distraction/Compression.  Pain with internal/external rotation of right hip.  Fabere's positive on the right.  Facet loading test is negative bilaterally.      Right Knee: pain with extension and flexion. TTP diffusely. Crepitus Present. No pain on left.    BUE & BLE strength is normal and symmetric.  No atrophy or tone abnormalities are noted.    RIGHT Lower extremity: Hip flexion 5/5, Hip Abduction 5/5, Hip Adduction 5/5, Knee extension 5/5, Knee flexion 5/5, Ankle dorsiflexion5/5, Extensor hallucis longus 5/5, Ankle plantarflexion 5/5  LEFT Lower extremity:  Hip flexion 5/5, Hip Abduction 5/5,Hip Adduction 5/5, Knee extension 5/5, Knee flexion 5/5, Ankle dorsiflexion 5/5, Extensor hallucis longus 5/5, Ankle plantarflexion 5/5  -Normal testing knee (patellar) jerk and ankle (achilles) jerk    NEURO: BUE & BLE coordination and muscle stretch reflexes are physiologic and symmetric. No loss of sensation is noted.  GAIT: antalgic gait, ambulates with cane.          Imaging/ Diagnostic Studies/ Labs (Reviewed on 4/9/2025):      11/13/2024 XR KNEE ORTHO RIGHT WITH FLEXION  COMPARISON: 01/02/2024   FINDINGS:  No significant joint space narrowing.  Mild DJD.  No fracture or dislocation.  No bone destruction identified    09/25/2024 X-Ray Hips Bilateral 2 View Inc AP Pelvis  FINDINGS:   No fracture identified.  Intact well-positioned left total hip arthroplasty with prominent nearly bridging heterotopic ossification laterally.  No acute complication.  Joint alignment is anatomic.  Moderate to severe right hip osteoarthritis with central joint collapse, marginal osteophytes and mild subchondral cystic change.    09/03/2024 MRI Lumbar Spine W WO Contrast   COMPARISON: Plain films from 11/14/2022    FINDINGS:  There is 1-2 mm of  anterolisthesis L3 on L4 within intradiscal spacer present at the L3-4 level.  Pedicle screws fixation rods also present bilaterally at the L3-4 level.  The vertebral body heights are well maintained, with no fracture.  No marrow signal abnormality suspicious for an infiltrative process.  No abnormal enhancement seen on the postcontrast images.    The conus medullaris terminates at approximately the superior endplate of L2.  The adjacent soft tissue structures show no significant abnormalities.  There is disc desiccation noted throughout the lumbar spine with moderate disc space narrowing seen at L5-S1 and more mild disc space narrowing seen at the remaining levels.    L1-L2: No significant central canal or neural foraminal narrowing.    L2-L3: Minimal diffuse disc bulge along with bilateral ligamentum flavum hypertrophy resulting in relatively mild narrowing of the central canal at L2-3.  No neural foraminal canal narrowing.    L3-L4: No significant central canal or neural foraminal narrowing. Postoperative changes seen at this level - L3-4.    L4-L5:  Diffuse disc bulge slightly more prominent in the right paracentral region along with moderate bilateral facet arthropathy resulting in relatively mild narrowing of the central canal at L4-5 and mild narrowing of either L4-5 neural foraminal canal right greater than the left.    L5-S1:  Mild-to-moderate bilateral facet arthropathy along with disc space narrowing seen at this level resulting in no significant central canal narrowing.  The bilateral L5-S1 neural foraminal canals appear to be mildly narrowed.  Impression:   1. Postoperative and mild degenerative changes of the lumbar spine as detailed above.  No high-grade central canal or neural foraminal canal narrowing suggested.      05/11/21 X-ray Knee Ortho Bilateral with Flexion  COMPARISON: December 14, 2017  FINDINGS:  Bilateral tricompartment degenerative changes.  There is increased narrowing medial  compartments.  No fracture, dislocation or effusions.  Soft tissues within normal limits.  Impression  Bilateral degenerative change without fracture or dislocation.      12/20/17 MRI Lumbar Spine W  WO Contrast  FINDINGS:  Since the previous study there has been a laminectomy at L4-L5. There is enhancement of granulation tissue at the operative site.  No abnormal enhancement surrounds the exiting nerve roots.  The thecal sac measures 9 mm AP at this level.  There is slight mass effect upon the lateral recess without definite compression of the descending L5 nerve roots. There is a synovial cyst with peripheral enhancement projecting centrally and inferiorly from the left facet joint at L3-L4 that demonstrates peripheral enhancement.  It causes mild spinal stenosis at the level of L4 and is slightly larger than on the previous study from May 2016. The conus medullaris terminates at the level ofL1-L2. No abnormal signal within the conus. Intervertebral disc levels are as follows:    T12-L1 disc: No significant disc pathology. No spinal canal or neural foraminal stenosis.    L1-L2 disc : No significant disc pathology. No spinal canal or neural foraminal stenosis.    L2-L3 disc: Normal disc height with mild degenerative facet changes and thickening of ligamentum flavum.  No significant canal or foraminal stenosis.    L3-L4 disc: Partial distal desiccation with a broad-based posterior disc bulge.  There is a far left lateral annular fissure of the disc.  Moderate to severe degenerative facet change with bilateral facet joint effusions.  The thecal sac measures 10 mm AP but is about 50% of normal cross-sectional area.  Disc material bulges into the floor of the left exit foramen and causes mild to moderate left foraminal stenosis.  There is minimal right foraminal stenosis.  Additionally, there is a synovial cyst projects centrally and inferiorly from the left facet joint.  The synovial cyst demonstrates peripheral  enhancement and measures 11 mm craniocaudal by 11 mm transverse by 5 mm AP.  It is best demonstrated on axial T2 series 6 image 23.  It causes mild thecal sac stenosis without definite neural compression.  There is a smaller synovial cyst projecting toward the left as well, remote from any neural structures.    L4-L5 disc: Level of interval laminectomy.  There is enhancing granulation tissue at the operative site without enhancement surrounding the exiting nerve roots.  The thecal sac measures 9 mm AP.  There is mild mass effect upon the lateral recesses without definite compression no descending L5 nerve roots.  Minimal bilateral foraminal stenosis.    L5-S1 disc: Partial does desiccation and disc space height loss.  Height loss is most severe toward the left where there are marginal osteophytes.  Osteophyte material encroaches into the floor of the left exit foramen causing moderate to severe left foraminal stenosis.  The right neural foramen is minimally narrowed.  Moderate degenerative facet change.  The thecal sac measures 12 mm AP.    IMPRESSION:  1.  There has been an interval laminectomy at L4-L5.  There is enhancing granulation tissue at the operative site.  There is diminished spinal stenosis at this level compared to the previous exam.    2.  Enhancement surrounds a synovial cyst that projects centrally and inferiorly from the left L3-L4 facet joint and causes mild spinal stenosis.  The cyst has increased in size compared to the previous examination.    3.  There is mild to moderate spinal stenosis at L3-L4 where the thecal sac is about 50% of normal cross-sectional area, predominantly related to hypertrophy of the posterior elements.    4.  There is a far left lateral annular fissure of L3-L4 disc.    5.  Moderate to severe left-sided foraminal stenosis at L5-S1.      11/14/22 X-Ray Lumbar Spine AP And Lateral  FINDINGS:  Prior posterior fusion of L3-L4 with interbody spacer.  Normal alignment.  No  evidence to suggest hardware failure.  Remaining lumbar vertebral bodies are normal in height and alignment.  There is mild to moderate multilevel degenerative disc disease most pronounced at L5-S1.  SI joints are intact.       11/18/19 X-Ray Cervical Spine AP And Lateral  FINDINGS:  There is visualization of C7-T1 disc level on the lateral view.  Interval progression multilevel degenerative changes throughout the C-spine.  Anterior and posterior marginal spurring with concomitant varying degrees of loss of disc height throughout the C-spine.  No acute fracture or subluxation.  C1-2 articulation appears within normal limits allowing for rotation.  Impression  Interval progression multilevel cervical spondylosis without acute fracture or subluxation.  Follow-up and/or further evaluation as warranted.        ASSESSMENT: 84 y.o. year old male with lower back pain, consistent with     1. Primary osteoarthritis of right hip  Case Request-RAD/Other Procedure Area: right femoral/ obturator nerve block      2. Primary osteoarthritis of right knee        3. History of left hip replacement        4. History of lumbar fusion        5. History of lumbar laminectomy        6. Lumbar spondylosis                  PLAN:   - Interventions:  - Schedule right femoral/ obturator nerve block. Patient is taking ASA; he does not have to stop prior to procedure.     - S/p Right Intra-articular hip injection on 12/16/2024 with Dr. Merissa Kelly with some pain relief, maybe 75-80% pain relief.   - S/p right L4/5 + L5/S1 TF GAEL on 10/11/24 with limited pain relief.     - Anticoagulation use: aspirin - 1° prevention - Dr. Melton (Cardiology).     - Medications:  - D/c Lyrica (pregabalin) 150mg QHS - due to SE (drowsiness, hallucinations).    - Continue topical hemp seed oil topically for knee and back.  - Continue topical diclofenac 1% gel to apply 2g topically up to 4 times per day PRN.   - Continue topical lidocaine 2.5%-prilocaine 2.5%  topical cream Q6H PRN topically; can alternate with Voltaren gel.       - LA  reviewed and appropriate.       - Therapy: We have discussed continuing learned exercises learned at physical therapy to help manage the patient/s painful condition. The patient was previously counseled that muscle strengthening will improve the long term prognosis in regards to pain and may also help increase range of motion and mobility.  Patient has continued conventional land-based physical therapy from 05/03/2024, 07/03/2024, twice weekly sessions--13 sessions total.  Sciatic exercises given to patient to perform at home.  Patient previously declined formal physical therapy referral.    Diagnostic/ Imaging: No new imaging ordered. Previous imaging reviewed.   MRI Lumbar Spine W WO Contrast (09/03/2024) reviewed: Postoperative and mild degenerative changes of the lumbar spine as detailed above.  No high-grade central canal or neural foraminal canal narrowing suggested.     - Referrals:   - Continue follow-up with Dr. Schuler (Orthopedics): R hip pain. Planning for hip replacement.    - Previously referred back to Dr. Jeffy Iqbal (Neurosurgery) at [facility] [place] - weakness. Had appointment on 1/15/24.   - Also has been seen by our counterpart - Pain Mgmt in [place]:      -Continue follow-up with Dr. Douglas:  Extensor carpi ulnaris tendonitis.    -Continue follow-up with Dr. Wall (Orthopedics):      - Follow up visit: 4 weeks post-procedure - in clinic (per pt request)       Future Appointments   Date Time Provider Department Center   4/12/2025  1:00 PM [facility] MRI1 [facility] MRI [place]   4/15/2025  9:00 AM Placido Ugalde MD [facility] NEURO [place]   4/24/2025 11:20 AM Yeison Wilder MD [facility] IM [place]   4/28/2025  9:00 AM [facility] NM1 [facility] NUCLEAR [place]   4/28/2025 10:00 AM TREADMILL, [facility] CP DIAG [place]   4/28/2025 10:45 AM ECHO, INPATIENT [facility] IP ECHO [facility]  [place]   4/28/2025 12:00 PM [facility] NM1 [facility] NUCLEAR [place]   5/5/2025  9:00 AM Emmanuel Schuler MD ON ORTHO [facility]   5/19/2025  9:40 AM Martin Machuca MD ON PULMSVC [facility]   5/26/2025 12:40 PM Leatha Franks PA-C [facility] INT [place]   6/2/2025  1:00 PM Paul Melton MD [facility] CARDIO [place]   8/5/2025 11:20 AM Debo Nguyễn NP ON UROLOGY [facility]       Visit today included increased complexity associated with the care of the episodic problem of chronic pain which was addressed and continue to manage the longitudinal care of the patient due to the serious and/or complex managed problem(s) listed above.    - Patient Questions: Answered all of the patient's questions regarding diagnosis, therapy, and treatment.    - This condition does not require this patient to take time off of work, and the primary goal of our Pain Management services is to improve the patient's functional capacity.   - I discussed the risks, benefits, and alternatives to potential treatment options. All questions and concerns were fully addressed today in clinic.         Leatha Franks PA-C  Interventional Pain Management - [facility]    Disclaimer:  This note was prepared using voice recognition system and is likely to have sound alike errors that may have been overlooked even after proof reading.  Please call me with any questions.

## 2025-04-09 NOTE — PROGRESS NOTES
Established Patient Chronic Pain Note (Follow-up Visit)    Referring Physician: Yeison Wilder MD    PCP: Yeison Wilder MD      Chief complaint:  Hip Pain (right), thigh (right), and Knee Pain (right)     low back pain with RLE radicular pain (now bilateral)  Right knee pain   Right hip pain, occasional left hip pain (h/o left replacement)              Interval History (4/9/2025):  Srinath Mcknight presents today for follow-up visit.  Patient was last seen on 1/8/2025. At that visit, the plan was to continue Lyrica, which he is no longer taking -- due to SE of drowsiness, hallucinations. Patient reports pain as 10/10 today.  Since last visit, he has seen Dr. Schuler (HPI below). He is scheduled for upcoming knee MRI and will follow-up with him after.     HPI:  03/31/2025  Severe right hip, right knee pain.  This is been going on for several years now.  Treatment in the past included right knee Kenalog injection 01/02/2024, right knee Orthovisc 03/01/2024 right knee Synvisc-One 04/14/2023 by Dr. CHAPARRO and right hip intra-articular injection 12/16/2024 by Dr. Sarah Benedict in [place].  The hip injection seems to have lasted 1 month he said he was without any pain in the hip and the knee pain was minimize was not bothering him as much.  He ambulates with a cane and he does have a Rollator.  He did finish physical therapy at [facility] on the [place].  History of left total hip replacement by Dr. garcia with a revision.  Review of the revision could not tell for sure what was done probably revising the femoral component.  He stated the left hip is not bothering him as much there is limitation of range of motion and you feel that the left leg is slightly shorter.  Has numbness and tingling both feet I did review MRI of the lumbar spine that did not show any impingement.  Most likely what he has is peripheral neuropathy.  He has quite a bit of issues with his heart he seeing Dr. Melton also with swelling in his left  "leg and he is already scheduled for ultrasound today also has other tests to be performed.  Received multiple injections in the lumbar spine by Dr. CHAPARRO 10/11/2024 right L4-5 and L5-S1 GAEL, 04/14/2023 right knee Synvisc   Today he is not having any fever or chills or any shortness of breath or chest pain or difficulty with chewing or swallowing    Interval History (1/8/2025):  Patient presents today for follow-up visit.  Patient was last seen on 11/6/2024 by our clinic but has seen seen Orthopedics in [place], then referred to pain mgmt there.  At the last visit with me, patient was started on Lyrica.  Patient reports pain as 5/10 today.  S/p Right Intra-articular hip injection on 12/16/2024 with Dr. Merissa Kelly with some pain relief, maybe 75-80% pain relief.  Today, he complains of right knee and low back pain. He reports no pain relief with previous lumbar injection or knee injection (visco and steroid).   At last visit, he was started on Lyrica. He was unsure what medication is causing light headedness and risk for falls. He reports he stopped taking Klonopin because of this, which he feels was the cause of the dizziness/ confusion. He reports he "jumps up at night seeing things" some nights. He reports he has been off of the Klonopin for about 3 weeks.     11/15/2024 - Dr. Kelly (Pain Mgmt): Srinath Mcknight is a 83 y.o. male who presents complaining of right hip pain that radiates into the right thigh and right knee. Patient was referred by Dr. Wall (Orthopedics).   He recently underwent right L4-L5 and L5-S1 transforaminal epidural steroid injection with little to no relief. He was recently evaluated by Orthopedics and found to have severe osteoarthritis in the right hip.  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  Orthopedics recommending right intra-articular hip injection.  He does have a history of total hip replacement on the left. Patient was previously following at the [facility] pain management " clinic.   11/13/24 - Dr. Wall (Ortho): Patient presents with multiple pain complaints affecting his lower back, both hips, both thighs, and right knee, with symptoms persisting for about 3-4 years. His right knee is worse than the left. He expresses frustration with previous medical care, stating that he has been going in circles when describing his experience with different specialists. He uses two canes for walking due to balance issues, explaining that he tends to fall in the direction he's leaning without the support. He denies feeling dizzy or lightheaded when unsteady.  He has a history of hip replacement surgery, which required two procedures on the same hip. He has also undergone back surgeries, with two performed at the spine clinic in [place] and the first one in 2017.  Recently, he has been experiencing slurred speech for the last two weeks or more. His wife notes that he sleeps excessively.  He reports pain and burning sensations in his feet, especially at night. He describes his knee pain as feeling like sharp, pin sensations at night, stating that he has to hold it as it won't straighten out. He also experiences pain in the groin area when walking.  He has previously received injections for pain management but reports dissatisfaction with his current pain management care in [place], stating that pain management has indicated they have exhausted their treatment options.  Regarding his back pain, he experiences discomfort when getting up and walking. He has tried using a brace for support but found it ineffective, stating that it does not provide adequate support for his upper abdominal area.  He denies feeling dizzy or lightheaded when unsteady. He denies having Parkinson's disease.    Interval History (11/6/2024): Srinath Mcknight presents today for follow-up visit.  he underwent right L4/5 + L5/S1 TF GAEL on 10/11/24.  The patient reports that he is/was slightly better following the procedure.  "   Patient reports pain as 7/10 today.  He is having hip and knee pain as well.  He stopped taking Lyrica when he ran out of the prescription.  He does not want to do physical therapy because he can do it on his own.  He has not seen Neurosurgery in over 7 months per reporting.    Interval History (9/25/2024):  Patient presents today for follow-up visit to review lumbar MRI results.   Patient reports pain as 5/10 today.  Continues to have right leg pain along the L4 dermatome.  He feels that he has been told that the pain is generating from different sources.  He was told in the past that he would require a right hip replacement.  He has a history of left hip replacement and has no pain on that side.  He also has right knee pain, which did not improve with injections.  He also reports that he was told in the past that 1 leg is shorter than the other, which she uses a leg lift insert for; he does feel this helps some.  He finished physical therapy 2 months ago.    Interval history 08/01/2024  Srinath Mcknight is a 83 y.o. male with past medical history significant for restrictive lung disease, hyperlipidemia, hypertension, type 2 diabetes complicated by peripheral angiopathy and nonproliferative retinopathy, stage 3 chronic kidney disease who presents to the clinic for the evaluation of lower back and leg pain. Today he reports pain is constant and is rated a 10/10.  Of note patient has history of prior L3-4 lumbar fusion and L4-5 lumbar laminectomy with Dr. Iqbal.  Today reports pain in the lower back which can radiate down the anterior aspects of bilateral lower extremities in L3-5 distribution to the knee.  Pain feels like a ball" in the thigh.  Pain is worse on the right than on the left.  Pain is exacerbated with positional changes moving from sitting to standing and with standing and with ambulation.  Patient is able to ambulate with assistive device, cane only a few minutes before requiring rest.  He does " endorse associated weakness in the lower extremities associated with his pain.  Pain is improved with sitting.  Of note patient has continued Lyrica 75 mg twice daily but he is unsure of its degree of benefit.  He reports cumbersome polypharmacy and is unsure which medications are effective. Patient has continued conventional land-based physical therapy from 05/03/2024, 07/03/2024, twice weekly sessions--13 sessions total.  He reports physical therapy is beneficial only while he is actively in the session.  Patient has plans to join a gym for exercise.  He also reports longstanding right knee pain.  Patient reports receiving corticosteroid injection and series of 3 viscosupplementation with Dr. Winters (1-3/2024) without relief exceeding 1 day.  Patient has not been evaluated for surgical intervention but secondary to history of prior numerous surgeries, he would like to avoid surgical intervention if possible.    Patient reports significant motor weakness and loss of sensations.  Patient denies night fever/night sweats, urinary incontinence, bowel incontinence, and significant weight loss.        Pain Disability Index (PDI) Score Review:      4/9/2025    10:18 AM 1/8/2025     1:07 PM 11/15/2024     2:48 PM   Last 3 PDI Scores   Pain Disability Index (PDI) 60 35 47       Non-Pharmacologic Treatments:  Physical Therapy/Home Exercise: yes  Ice/Heat:yes  TENS: no  Acupuncture: no  Massage: yes  Chiropractic: no    Relevant sx:  -Dr. Alcocer: L YAHIR 08/15/2017, L hip arthroplasty resection 09/04/2018  -Dr. Iqbal: posterior lumbar fusion      Pain Medications:  - Adjuvant Medications: Lyrica ( Pregabalin) and Topical Ointment (Voltaren Gel, Steroid cream, Anti-Inflammatory Cream, Compound cream)      Pain Procedures:     Dr. Merissa Kelly:  -12/16/2024: Right Intra-articular hip injection with 75-80% pain relief    Dr. Brooks:  -04/14/2023:  right knee IA Synvisc injection  -10/11/2024: right L4/5 + L5/S1 TF GAEL      Dr. Roldan:  -02/15/2018:  Left-sided SI joint injection  -01/24/2018: Left-sided L4-5 and L5-S1 transforaminal epidural steroid injection    Dr. Hutchinson:  -06/14/2016: Left-sided L3-4 facet injection and cyst aspiration    Dr. Madera:  -05/18/2016:  Left hip intra-articular joint injection    Dr. Douglas:  -09/06/2023: Right radiocarpal injection    Dr. Winters:  -02/16/2024, 02/23/2024, 03/01/2024: Right knee intra-articular joint injection with Orthovisc  -01/02/2024: Right knee intra-articular joint injection with triamcinolone  -11/09/2022: Right knee intra-articular joint injection with triamcinolone  -11/10/2021: Right knee intra-articular joint injection with triamcinolone      Interval History (8/16/2023):   Patient presents today for follow-up visit.  Patient was last seen on 7/17/2023. At that visit, the plan was to  Schedule right genicular nerve block, but it was not approved with insurance. Patient reports pain as 7/10 today.  He continues to have chronic knee pain.  He has failed knee steroid injections with Dr. Winters.  Also reports that he has had viscosupplementation in the past with limited pain relief.  He does not believe that he is a surgical candidate.    Interval History (7/17/2023):  Srinath Mcknight presents today for follow-up visit.  Patient was last seen on 5/11/2023. Pain in right knee has returned; it has only been about 3 months since viscosupplementation. Patient reports pain as 7/10 today.  He has been going to the gym.  He also reports right hand pain.  He saw Dr. Douglas in the past and had an injection, and the pain has returned.    Interval History (5/11/2023): Srinath Mcknight presents today for follow-up visit.  he underwent right knee Synvisc-One injection on 4/14/23 (1 month ago).  The patient reports that he is/was better following the procedure.  he reports 50% pain relief.     The changes have continued through this visit.  Patient reports pain as 8/10 today.  His  main complaint today is right groin pain.  He has never had any prior treatment of his right hip in the past.    Interval History (3/23/2023):  Srinath Mcknight presents today for follow-up visit.  Patient was last seen on 2/16/2023. At that visit, the plan was to start Lyrica, only talking QHS, which seems to be helping. Patient reports pain as 6/10 today.  His main complaint today is right knee pain.  He had a right knee steroid injection with Dr. Winters on 11/09/2022 with about 3-4 months pain relief.  This was the 1st treatment he has had for his right knee in the past.  Does not have any left knee pain.  Continues to do at home physical therapy exercises, which she feels is helping his back pain.  Overall, stable; pain is better controlled than it was at the last visit.    Initial HPI (2/16/2023):  Srinath Mcknight is a 82 y.o. male with past medical history significant for hypertension, hyperlipidemia, type 2 diabetes, coronary artery disease, urinary incontinence, sickle cell trait, stage 3 chronic kidney disease, gout, obstructive sleep apnea, history of L3-4 lumbar fusion and L4-5 lumbar laminectomy who presents to the clinic for the evaluation of lower back pain.  Of note patient reports 3 prior back surgeries:  The initial several years prior in the left lower back at [facility], and 2 lower back surgeries with Dr. Iqbal within the last 2 years.  Today patient reports pain which is constant which is rated an 8/5.  Pain is described as aching in nature.  Patient reports pain in a bandlike distribution in the lower back without radiation into the buttocks or lower extremities.  Patient does report chronic right-sided knee pain and history of total left hip arthroplasty.  Pain is exacerbated 1st thing in the morning when arising, with lumbar extension as well as with ambulation.  Patient reports he is able to ambulate only a few steps before requiring rest.  Pain is improved with lumbar support  as well as sitting.  Patient also reports receiving a lumbar epidural steroid injection at the back and spine [place] without any meaningful relief.  Patient has completed conventional physical therapy last year and continues physician directed physical therapy exercises at home without meaningful relief.  Patient has been taking gabapentin 600 mg once daily without improvement in his pain.  Patient does endorse weakness in the lower extremities associated with his pain.  Patient reports significant motor weakness.  Patient denies night fever/night sweats, urinary incontinence, bowel incontinence, significant weight loss, and loss of sensations.      Pain Disability Index Review:      4/9/2025    10:18 AM 1/8/2025     1:07 PM 11/15/2024     2:48 PM   Last 3 PDI Scores   Pain Disability Index (PDI) 60 35 47       Non-Pharmacologic Treatments:  Physical Therapy/Home Exercise: yes  Ice/Heat:no  TENS: no  Acupuncture: no  Massage: no  Chiropractic: no    Other: yes; sx x 3: [facility], Dr. Iqbal x 2      Pain Medications:  - Opioids: Percocet (Oxycodone/Acetaminophen)  - Adjuvant Medications: Clonazepam (Klonopin), Neurontin (Gabapentin), and Prednisone (Deltasone)    Relevant sx:  - 3 prior back surgeries:  1st [facility], and 2 lower back surgeries with Dr. Iqbal (March 2018: L3-L4 L5 decompression/laminotomy/micro dissection; August 2019: L3-4 revision with cage insertion and laminectomy/diskectomy)  - total left hip arthroplasty (total of 2 surgeries)      Pain Procedures:     Other providers:  -cervical medial branch facet injections in 2020 with Dr. Linares with subsequent RFA  -02/15/2018: Left-sided sacroiliac joint injection; Dr. Roldan  -01/24/2018: Left-sided L4/5 and L5/S1 transforaminal epidural steroid injection; Dr. Roldan  -06/14/2016: Left L3-4 facet joint injection; Dr. Hutchinson  -05/18/2016: Left hip intra-articular injection posterior approach; Dr. Madera    Orthopedics:  -right  "knee steroid injection with Dr. Winters on 11/09/2022 with about 3-4 months pain relief  -right wrist injection in May 2021 with Dr. Douglas       Review of Systems:  GENERAL:  No weight loss, malaise or fevers.  HEENT:   No recent changes in vision or hearing  NECK:  Negative for lumps, no difficulty with swallowing.  RESPIRATORY:  Negative for cough, wheezing or shortness of breath, patient denies any recent URI.  CARDIOVASCULAR:  Negative for chest pain or palpitations.  GI:  Negative for abdominal discomfort, blood in stools or black stools or change in bowel habits.  MUSCULOSKELETAL:  See HPI.  SKIN:  Negative for lesions, rash, and itching.  PSYCH:  No mood disorder or recent psychosocial stressors.   HEMATOLOGY/LYMPHOLOGY:  Negative for prolonged bleeding, bruising easily or swollen nodes.    NEURO:   No history of syncope, paralysis, seizures or tremors.  All other reviewed and negative other than HPI.        OBJECTIVE:    Physical Exam:  Vitals:    04/09/25 1020   BP: (!) 140/76   Pulse: 84   Resp: 17   Weight: 94.9 kg (209 lb 3.5 oz)   Height: 6' 3" (1.905 m)   PainSc: 10-Worst pain ever   PainLoc: Hip   Body mass index is 26.15 kg/m².   (reviewed on 4/9/2025)    GENERAL: Well appearing, in no acute distress, alert and oriented x3.  PSYCH:  Mood and affect appropriate.  SKIN: Skin color, texture, turgor normal, no rashes or lesions.  HEAD/FACE:  Normocephalic, atraumatic.    PULM: No evidence of respiratory difficulty, symmetric chest rise.       BACK:  Positive shopping cart sign.  Well-healed 5 in lower lumbar vertical incision.  SLR is Equivocal to radicular pain on right. No pain to palpation over the facet joints of the lumbar spine or spinous processes.  Reduced range of motion with pain reproduction.  EXTREMITIES:  Peripheral joint range of motion is reduced with pain with instability noted bilateral lower extremities. No deformities, edema, or skin discoloration. Right wrist: TTP over medial " wrist.  MUSCULOSKELETAL: Able to stand on heels but not toes secondary to history of broken toes.     There is no pain with palpation over the sacroiliac joints bilaterally.  Gaenslen's, Distraction/Compression.  Pain with internal/external rotation of right hip.  Fabere's positive on the right.  Facet loading test is negative bilaterally.      Right Knee: pain with extension and flexion. TTP diffusely. Crepitus Present. No pain on left.    BUE & BLE strength is normal and symmetric.  No atrophy or tone abnormalities are noted.    RIGHT Lower extremity: Hip flexion 5/5, Hip Abduction 5/5, Hip Adduction 5/5, Knee extension 5/5, Knee flexion 5/5, Ankle dorsiflexion5/5, Extensor hallucis longus 5/5, Ankle plantarflexion 5/5  LEFT Lower extremity:  Hip flexion 5/5, Hip Abduction 5/5,Hip Adduction 5/5, Knee extension 5/5, Knee flexion 5/5, Ankle dorsiflexion 5/5, Extensor hallucis longus 5/5, Ankle plantarflexion 5/5  -Normal testing knee (patellar) jerk and ankle (achilles) jerk    NEURO: BUE & BLE coordination and muscle stretch reflexes are physiologic and symmetric. No loss of sensation is noted.  GAIT: antalgic gait, ambulates with cane.          Imaging/ Diagnostic Studies/ Labs (Reviewed on 4/9/2025):      11/13/2024 XR KNEE ORTHO RIGHT WITH FLEXION  COMPARISON: 01/02/2024   FINDINGS:  No significant joint space narrowing.  Mild DJD.  No fracture or dislocation.  No bone destruction identified    09/25/2024 X-Ray Hips Bilateral 2 View Inc AP Pelvis  FINDINGS:   No fracture identified.  Intact well-positioned left total hip arthroplasty with prominent nearly bridging heterotopic ossification laterally.  No acute complication.  Joint alignment is anatomic.  Moderate to severe right hip osteoarthritis with central joint collapse, marginal osteophytes and mild subchondral cystic change.    09/03/2024 MRI Lumbar Spine W WO Contrast   COMPARISON: Plain films from 11/14/2022    FINDINGS:  There is 1-2 mm of  anterolisthesis L3 on L4 within intradiscal spacer present at the L3-4 level.  Pedicle screws fixation rods also present bilaterally at the L3-4 level.  The vertebral body heights are well maintained, with no fracture.  No marrow signal abnormality suspicious for an infiltrative process.  No abnormal enhancement seen on the postcontrast images.    The conus medullaris terminates at approximately the superior endplate of L2.  The adjacent soft tissue structures show no significant abnormalities.  There is disc desiccation noted throughout the lumbar spine with moderate disc space narrowing seen at L5-S1 and more mild disc space narrowing seen at the remaining levels.    L1-L2: No significant central canal or neural foraminal narrowing.    L2-L3: Minimal diffuse disc bulge along with bilateral ligamentum flavum hypertrophy resulting in relatively mild narrowing of the central canal at L2-3.  No neural foraminal canal narrowing.    L3-L4: No significant central canal or neural foraminal narrowing. Postoperative changes seen at this level - L3-4.    L4-L5:  Diffuse disc bulge slightly more prominent in the right paracentral region along with moderate bilateral facet arthropathy resulting in relatively mild narrowing of the central canal at L4-5 and mild narrowing of either L4-5 neural foraminal canal right greater than the left.    L5-S1:  Mild-to-moderate bilateral facet arthropathy along with disc space narrowing seen at this level resulting in no significant central canal narrowing.  The bilateral L5-S1 neural foraminal canals appear to be mildly narrowed.  Impression:   1. Postoperative and mild degenerative changes of the lumbar spine as detailed above.  No high-grade central canal or neural foraminal canal narrowing suggested.      05/11/21 X-ray Knee Ortho Bilateral with Flexion  COMPARISON: December 14, 2017  FINDINGS:  Bilateral tricompartment degenerative changes.  There is increased narrowing medial  compartments.  No fracture, dislocation or effusions.  Soft tissues within normal limits.  Impression  Bilateral degenerative change without fracture or dislocation.      12/20/17 MRI Lumbar Spine W  WO Contrast  FINDINGS:  Since the previous study there has been a laminectomy at L4-L5. There is enhancement of granulation tissue at the operative site.  No abnormal enhancement surrounds the exiting nerve roots.  The thecal sac measures 9 mm AP at this level.  There is slight mass effect upon the lateral recess without definite compression of the descending L5 nerve roots. There is a synovial cyst with peripheral enhancement projecting centrally and inferiorly from the left facet joint at L3-L4 that demonstrates peripheral enhancement.  It causes mild spinal stenosis at the level of L4 and is slightly larger than on the previous study from May 2016. The conus medullaris terminates at the level ofL1-L2. No abnormal signal within the conus. Intervertebral disc levels are as follows:    T12-L1 disc: No significant disc pathology. No spinal canal or neural foraminal stenosis.    L1-L2 disc : No significant disc pathology. No spinal canal or neural foraminal stenosis.    L2-L3 disc: Normal disc height with mild degenerative facet changes and thickening of ligamentum flavum.  No significant canal or foraminal stenosis.    L3-L4 disc: Partial distal desiccation with a broad-based posterior disc bulge.  There is a far left lateral annular fissure of the disc.  Moderate to severe degenerative facet change with bilateral facet joint effusions.  The thecal sac measures 10 mm AP but is about 50% of normal cross-sectional area.  Disc material bulges into the floor of the left exit foramen and causes mild to moderate left foraminal stenosis.  There is minimal right foraminal stenosis.  Additionally, there is a synovial cyst projects centrally and inferiorly from the left facet joint.  The synovial cyst demonstrates peripheral  enhancement and measures 11 mm craniocaudal by 11 mm transverse by 5 mm AP.  It is best demonstrated on axial T2 series 6 image 23.  It causes mild thecal sac stenosis without definite neural compression.  There is a smaller synovial cyst projecting toward the left as well, remote from any neural structures.    L4-L5 disc: Level of interval laminectomy.  There is enhancing granulation tissue at the operative site without enhancement surrounding the exiting nerve roots.  The thecal sac measures 9 mm AP.  There is mild mass effect upon the lateral recesses without definite compression no descending L5 nerve roots.  Minimal bilateral foraminal stenosis.    L5-S1 disc: Partial does desiccation and disc space height loss.  Height loss is most severe toward the left where there are marginal osteophytes.  Osteophyte material encroaches into the floor of the left exit foramen causing moderate to severe left foraminal stenosis.  The right neural foramen is minimally narrowed.  Moderate degenerative facet change.  The thecal sac measures 12 mm AP.    IMPRESSION:  1.  There has been an interval laminectomy at L4-L5.  There is enhancing granulation tissue at the operative site.  There is diminished spinal stenosis at this level compared to the previous exam.    2.  Enhancement surrounds a synovial cyst that projects centrally and inferiorly from the left L3-L4 facet joint and causes mild spinal stenosis.  The cyst has increased in size compared to the previous examination.    3.  There is mild to moderate spinal stenosis at L3-L4 where the thecal sac is about 50% of normal cross-sectional area, predominantly related to hypertrophy of the posterior elements.    4.  There is a far left lateral annular fissure of L3-L4 disc.    5.  Moderate to severe left-sided foraminal stenosis at L5-S1.      11/14/22 X-Ray Lumbar Spine AP And Lateral  FINDINGS:  Prior posterior fusion of L3-L4 with interbody spacer.  Normal alignment.  No  evidence to suggest hardware failure.  Remaining lumbar vertebral bodies are normal in height and alignment.  There is mild to moderate multilevel degenerative disc disease most pronounced at L5-S1.  SI joints are intact.       11/18/19 X-Ray Cervical Spine AP And Lateral  FINDINGS:  There is visualization of C7-T1 disc level on the lateral view.  Interval progression multilevel degenerative changes throughout the C-spine.  Anterior and posterior marginal spurring with concomitant varying degrees of loss of disc height throughout the C-spine.  No acute fracture or subluxation.  C1-2 articulation appears within normal limits allowing for rotation.  Impression  Interval progression multilevel cervical spondylosis without acute fracture or subluxation.  Follow-up and/or further evaluation as warranted.        ASSESSMENT: 84 y.o. year old male with lower back pain, consistent with     1. Primary osteoarthritis of right hip  Case Request-RAD/Other Procedure Area: right femoral/ obturator nerve block      2. Primary osteoarthritis of right knee        3. History of left hip replacement        4. History of lumbar fusion        5. History of lumbar laminectomy        6. Lumbar spondylosis                  PLAN:   - Interventions:  - Schedule right femoral/ obturator nerve block. Patient is taking ASA; he does not have to stop prior to procedure.     - S/p Right Intra-articular hip injection on 12/16/2024 with Dr. Merissa Kelly with some pain relief, maybe 75-80% pain relief.   - S/p right L4/5 + L5/S1 TF GAEL on 10/11/24 with limited pain relief.     - Anticoagulation use: aspirin - 1° prevention - Dr. Melton (Cardiology).     - Medications:  - D/c Lyrica (pregabalin) 150mg QHS - due to SE (drowsiness, hallucinations).    - Continue topical hemp seed oil topically for knee and back.  - Continue topical diclofenac 1% gel to apply 2g topically up to 4 times per day PRN.   - Continue topical lidocaine 2.5%-prilocaine 2.5%  topical cream Q6H PRN topically; can alternate with Voltaren gel.       - LA  reviewed and appropriate.       - Therapy: We have discussed continuing learned exercises learned at physical therapy to help manage the patient/s painful condition. The patient was previously counseled that muscle strengthening will improve the long term prognosis in regards to pain and may also help increase range of motion and mobility.  Patient has continued conventional land-based physical therapy from 05/03/2024, 07/03/2024, twice weekly sessions--13 sessions total.  Sciatic exercises given to patient to perform at home.  Patient previously declined formal physical therapy referral.    Diagnostic/ Imaging: No new imaging ordered. Previous imaging reviewed.   MRI Lumbar Spine W WO Contrast (09/03/2024) reviewed: Postoperative and mild degenerative changes of the lumbar spine as detailed above.  No high-grade central canal or neural foraminal canal narrowing suggested.     - Referrals:   - Continue follow-up with Dr. Schuler (Orthopedics): R hip pain. Planning for hip replacement.    - Previously referred back to Dr. Jeffy Iqbal (Neurosurgery) at [facility] for evaluation - weakness. Had appointment on 1/15/24.   - Also has been seen by our counterpart - Pain Mgmt in [place]:      -Continue follow-up with Dr. Douglas:  Extensor carpi ulnaris tendonitis.    -Continue follow-up with Dr. Wall (Orthopedics):      - Follow up visit: 4 weeks post-procedure - in clinic (per pt request)       Future Appointments   Date Time Provider Department Center   4/12/2025  1:00 PM [facility] MRI1 [facility] MRI [place]   4/15/2025  9:00 AM Placido Ugalde MD [facility] NEURO [place]   4/24/2025 11:20 AM Yeison Wilder MD [facility] IM [place]   4/28/2025  9:00 AM [facility] NM1 [facility] NUCLEAR [place]   4/28/2025 10:00 AM TREADMILL, [facility] CP DIAG [place]   4/28/2025 10:45 AM ECHO, INPATIENT [facility] IP ECHO [facility]  [place]   4/28/2025 12:00 PM [facility] NM1 [facility] NUCLEAR [place]   5/5/2025  9:00 AM Emmanuel Schuler MD ON ORTHO [facility]   5/19/2025  9:40 AM Martin Machuca MD ON PULMSVC [facility]   5/26/2025 12:40 PM Leatha Franks PA-C [facility] INT [place]   6/2/2025  1:00 PM Paul Melton MD [facility] CARDIO [place]   8/5/2025 11:20 AM Debo Nguyễn NP ON UROLOGY [facility]       Visit today included increased complexity associated with the care of the episodic problem of chronic pain which was addressed and continue to manage the longitudinal care of the patient due to the serious and/or complex managed problem(s) listed above.    - Patient Questions: Answered all of the patient's questions regarding diagnosis, therapy, and treatment.    - This condition does not require this patient to take time off of work, and the primary goal of our Pain Management services is to improve the patient's functional capacity.   - I discussed the risks, benefits, and alternatives to potential treatment options. All questions and concerns were fully addressed today in clinic.         Leatha Franks PA-C  Interventional Pain Management - [facility]    Disclaimer:  This note was prepared using voice recognition system and is likely to have sound alike errors that may have been overlooked even after proof reading.  Please call me with any questions.

## 2025-04-09 NOTE — PATIENT INSTRUCTIONS
Treatments - Obturator and Femoral Nerve Blocks and Radiofrequency Ablation    Overview  These procedures are intended to diagnose and treat chronic hip pain. The branches of the obturator nerve are responsible for part of the innervation of the hip and the skin and the muscles of the inner thigh. The obturator nerve block is used to treat hip joint pain. Another nerve responsible for part of the hip pain (outer part of the hip pain) is the femoral nerve. A block of the sensory fibers of the femoral nerve can be performed in addition to the obturator nerve block during the same procedure. If these nerve blocks provide pain relief, then a radiofrequency ablation and be considered.        Step 1 - Obturator and Femoral Nerve Block:  First an obturator and femoral nerve block is performed. During this procedure the nerves that are believed to be causing the pain are blocked or numbed temporarily. This is meant to determine and confirm if these nerves are responsible for the pain. Sometimes (if injection is only diagnostic), pain relief is only intended to last approximately 5-8 hours.    Step 2 - Obturator and Femoral Nerve Block:  The initial procedure is repeated. Two sets of injections are completed on two different occasions to confirm the underlying cause of the pain. If two blocks (step 1 and step 2) are successful in temporarily alleviating a large percentage of your pain, this is a positive test. At this point, radiofrequency ablation can be considered.    Step 3 - Obturator and Femoral Nerve Radiofrequency Ablation:  Radiofrequency (RFA) Ablation can be performed, which provides long lasting relief. Radiofrequency ablations involve using a special needle which heats up. This needle then burns or stuns the nerves going to the joints. The ablation interrupts the pain sensations originating from nerves for typically greater than 6 months.    What conditions are treated with Obturator and Femoral Nerve  Radiofrequency Ablation (RFA)?  These procedures are intended to treat pain from chronic hip pain, osteoarthritis, failed hip replacement, patients medically unstable for hip replacements and patients who want to avoid hip replacements.    How is the procedure performed?  Our team will help position you to make sure the procedure can be completed with the least amount of discomfort for you. The skin is cleansed with a sterilizing solution (chlorhexidine) and a sterile drape is placed. A local anesthetic medication typically (Lidocaine) is given to numb the skin. Next, fluoroscopy (x-ray) or ultrasound is used to find the target. Next the needle(s) is directed to the desired location. Contrast dye may then be injected to confirm the correct placement of the needle. A local anesthetic for numbing and /or steroids for reducing inflammation are then injected. During the entire procedure you will be constantly monitored by the physician. After the injection a small bandage is placed on your skin. You will be given time after the procedure to make sure you feel good and are not having side-effects before leaving the clinic.    What medication is injected?  The injection includes a combination of anesthetic (lidocaine or bupivacaine) and sometimes steroid (cortisone, Kenalog or dexamethasone).    Does the procedure hurt?  The procedure is typically well tolerated. A localized burning sensation from the anesthetic is commonly felt and is usually the most uncomfortable part of the process. During the procedure a pressure sensation is often experienced which typically resolves within a few minutes. Minor soreness for a week after the procedure is normal.    How long does the procedure take?  The procedure typically takes about 15 minutes to perform (not including check in and out times, workup, documentation, etc.)    How quickly will I get relief?  The nerve block (if diagnostic) will result in quick relief that only is  intended to last 8 hours. The ablation procedure may take 10-14 days to reach maximal effectiveness.     How long can I expect the relief to last?  The radiofrequency ablation is intended to last 6-12 months. Some patients can get relief lasting greater than a year.    How often can the procedure be repeated?  Depending on results and providers discretion, the injections may be repeated every 6-12 months.    Can I have the injection if I have diabetes?  Yes. It is important to control your blood sugar before and after the injection. Diabetic patients may experience a small temporary increase in blood sugar lasting no more than 2-3 days.    What are the risks and side effects?  Risks and side effects are minimal and serious complications are rare. We take every precaution to ensure safety. Potential risks may include but are not limited to: vasovagal response (passing out), new or increased pain, infection, bleeding, permanent skin changes, allergic or unexpected drug reaction with minor or major consequences, and unintended nerve injury.    Pre-Procedure Information  Please let us know if you have an active infection, are using antibiotics, or are using blood thinners.    Do I need a ?  Yes. For your safety we require a  to take you home.    Can I get sedation or anesthesia?  Most patients do well without sedation. Light sedation with oral medications can be provided at the physicians discretion.      Post-Procedure Information  When can I drive after the procedure?  We recommend resuming driving the next day.    What can I do if I am sore or have pain after the procedure?  Ice packs can be applied to the area for 20 minutes per hour. Over the counter Tylenol and Motrin can be used to aid with any discomfort.    When can I shower?  Showering the day of the procedure is allowed. For 24 hours you are asked to refrain from submerging or swimming in water. Keep the bandage on for one day.    When do I come  back for a follow-up visit?  We follow-up with all of our patients after their procedures. This will be scheduled by clinical staff.

## 2025-04-09 NOTE — PROGRESS NOTES
Subjective:       Patient ID: Srinath Mcknight is a 84 y.o. male.    Chief Complaint: No chief complaint on file.    HPI The patient presented on 12/ 2024 for evaluation of Polyneuropathy.  New issues: none.  Neuropathy: stable.   Falls: at least 5 since December / no major trauma.     Review of Systems   Musculoskeletal:  Positive for arthralgias.   Neurological:  Positive for numbness.        Falls.   Imbalance.    All other systems reviewed and are negative.        Current Medications[1]    Past Medical History:   Diagnosis Date    Anemia due to unknown mechanism 11/10/2015    Angina pectoris 2014    not since    Arthritis     Back pain     Coronary artery disease     Diabetes mellitus with stage 3 chronic kidney disease 11/18/2020    DM (diabetes mellitus) 25-30 years     am 05/15/2019    DM (diabetes mellitus)     BS 97 am 06/04/2021    Encounter for blood transfusion     Gout 12/05/2017    right foot     History of blood transfusion     Hyperlipemia     Hypertension     CHARLES (obstructive sleep apnea)     Polyneuropathy     Rheumatoid arthritis 3/31/2025    Rheumatoid arthritis (Problem)    Spinal cord disease     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    Trouble in sleeping     Type 2 diabetes mellitus with diabetic polyneuropathy, without long-term current use of insulin     Urinary incontinence     Uses hearing aid     bilat     Past Surgical History:   Procedure Laterality Date    BACK SURGERY  2019    COLONOSCOPY N/A 6/30/2020    Procedure: COLONOSCOPY;  Surgeon: Joseph Iraheta MD;  Location: [facility];  Service: Endoscopy;  Laterality: N/A;    ESOPHAGOGASTRODUODENOSCOPY N/A 6/30/2020    Procedure: EGD (ESOPHAGOGASTRODUODENOSCOPY);  Surgeon: Joseph Iraheta MD;  Location: [facility];  Service: Endoscopy;  Laterality: N/A;    HERNIA REPAIR Bilateral 04/18/2017    INJECTION OF JOINT Right 4/14/2023    Procedure: right Synvisc Knee injection;  Surgeon: Hossein Brooks MD;   Location: [facility];  Service: Pain Management;  Laterality: Right;    INJECTION OF JOINT Right 12/16/2024    Procedure: RT Intra-articular hip injection;  Surgeon: Merissa Kelly DO;  Location: [facility];  Service: Pain Management;  Laterality: Right;  oral sed-no ac    JOINT REPLACEMENT Left 10/09/2017    L YAHIR Dr. Alcocer    LAMINECTOMY  07/22/2016    left elbow  10/2017    procedure to remove gout    lumbar foraminotomy  03/2018    REVISION TOTAL HIP ARTHROPLASTY Left 9/11/2018    Procedure: REVISION, TOTAL ARTHROPLASTY, HIP;  Surgeon: Albaro Alcocer Sr., MD;  Location: [facility] OR;  Service: Orthopedics;  Laterality: Left;    ROTATOR CUFF REPAIR Left 2006    SHOULDER ARTHROSCOPY W/ ROTATOR CUFF REPAIR Right 2009    TRANSFORAMINAL EPIDURAL INJECTION OF STEROID Right 10/11/2024    Procedure: Right L4/5 + L5/S1 TF GAEL;  Surgeon: Hossein Brooks MD;  Location: [facility];  Service: Pain Management;  Laterality: Right;     Social History[2]    Past/Current Medical/Surgical History, Past/Current Social History,   Past/Current Family History and Past/Current Medications were reviewed in detail.    Objective:     VITAL SIGNS WERE REVIEWED    GENERAL APPEARANCE:     The patient looks comfortable.    BMI    No signs of respiratory distress.    Normal breathing pattern.    No dysmorphic features    Normal eye contact.       GENERAL MEDICAL EXAM:    HEENT:  Head is atraumatic normocephalic.     FUNDOSCOPIC (OPHTHALMOSCOPIC) EXAMINATION showed no disc edema (papilledema).      NECK: No JVD. No visible lesions or goiters.     CHEST-CARDIOPULMONARY: No cyanosis. No tachypnea. Normal respiratory effort.    [id number]: No jaundice. No stomas or lesions. No visible hernias. No catheters.     SKIN, HAIR, NAILS: No pathognomonic skin rash.No neurofibromatosis. No visible lesions.  No stigmata of autoimmune disease. No clubbing.    LIMBS: No varicose veins. No visible  swelling.    MUSCULOSKELETAL: No visible deformities.No visible lesions.    Neurological Exam  Mental Status  Alert. Oriented to person, place, time and situation. Recent and remote memory are intact. Able to copy figure. Clock drawing is normal. Speech is normal. Language is fluent with no aphasia. Attention and concentration are normal. Fund of knowledge is appropriate for level of education.    Cranial Nerves  CN II: Visual acuity is normal. Visual fields full to confrontation.  CN III, IV, VI: Extraocular movements intact bilaterally. Normal lids and orbits bilaterally. Pupils equal round and reactive to light bilaterally.  CN V: Facial sensation is normal.  CN VII: Full and symmetric facial movement.  CN VIII: Hearing is normal.  CN IX, X: Palate elevates symmetrically. Normal gag reflex.  CN XI: Shoulder shrug strength is normal.  CN XII: Tongue midline without atrophy or fasciculations.    Motor  Normal muscle bulk throughout. No fasciculations present. Normal muscle tone. No abnormal involuntary movements. Strength is 5/5 throughout all four extremities.    Sensory  Light touch abnormality: Pinprick abnormality: Temperature abnormality: Vibration abnormality: Proprioception abnormality:     Reflexes                                            Right                      Left  Brachioradialis                    0                         0  Biceps                                 0                         0  Triceps                                0                         0  Finger flex                           0                         0  Hamstring                            0                         0  Patellar                                0                         0  Achilles                                0                         0  Right Plantar: mute  Left Plantar: mute  Jaw jerk absent.  Right pathological reflexes: Sera's absent.  Left pathological reflexes: Sera's absent.   Right palmomental  absent. Left palmomental absent.    Coordination    Finger-to-nose, rapid alternating movements and heel-to-shin normal bilaterally without dysmetria.    Gait  Casual gait: Wide stance. Reduced stride length. Ataxic and antalgic gait. Reduced right arm swing. Reduced left arm swing. Toe walking abnormality: Heel walking abnormality: Tandem gait abnormality: Romberg is absent. Abnormal pull test. Unable to rise from chair without using arms.        Lab Results   Component Value Date    WBC 4.24 03/09/2025    HGB 11.3 (L) 03/09/2025    HCT 33.8 (L) 03/09/2025    MCV 89 03/09/2025     03/09/2025     Sodium   Date Value Ref Range Status   03/09/2025 143 136 - 145 mmol/L Final     Potassium   Date Value Ref Range Status   03/09/2025 4.3 3.5 - 5.1 mmol/L Final     Chloride   Date Value Ref Range Status   03/09/2025 112 (H) 95 - 110 mmol/L Final     CO2   Date Value Ref Range Status   03/09/2025 21 (L) 23 - 29 mmol/L Final     Glucose   Date Value Ref Range Status   03/09/2025 104 70 - 110 mg/dL Final     BUN   Date Value Ref Range Status   03/09/2025 22 8 - 23 mg/dL Final     Creatinine   Date Value Ref Range Status   03/09/2025 1.5 (H) 0.5 - 1.4 mg/dL Final     Calcium   Date Value Ref Range Status   03/09/2025 8.8 8.7 - 10.5 mg/dL Final     Total Protein   Date Value Ref Range Status   03/09/2025 6.8 6.0 - 8.4 g/dL Final     Albumin   Date Value Ref Range Status   03/09/2025 3.5 3.5 - 5.2 g/dL Final     Total Bilirubin   Date Value Ref Range Status   03/09/2025 0.4 0.1 - 1.0 mg/dL Final     Comment:     For infants and newborns, interpretation of results should be based  on gestational age, weight and in agreement with clinical  observations.    Premature Infant recommended reference ranges:  Up to 24 hours.............<8.0 mg/dL  Up to 48 hours............<12.0 mg/dL  3-5 days..................<15.0 mg/dL  6-29 days.................<15.0 mg/dL       Alkaline Phosphatase   Date Value Ref Range Status   03/09/2025  "75 40 - 150 U/L Final     AST   Date Value Ref Range Status   03/09/2025 25 10 - 40 U/L Final     ALT   Date Value Ref Range Status   03/09/2025 21 10 - 44 U/L Final     Anion Gap   Date Value Ref Range Status   03/09/2025 10 8 - 16 mmol/L Final     eGFR if    Date Value Ref Range Status   06/01/2022 43 (A) >60 mL/min/1.73 m^2 Final     eGFR if non    Date Value Ref Range Status   06/01/2022 38 (A) >60 mL/min/1.73 m^2 Final     Comment:     Calculation used to obtain the estimated glomerular filtration  rate (eGFR) is the CKD-EPI equation.        Lab Results   Component Value Date    QCHDSNFS04 958 (H) 06/12/2020     Lab Results   Component Value Date    TSH 1.198 01/24/2025     No results found in the last 24 hours.    LABORATORY EVALUATION    RADIOLOGY EVALUATION     NEUROPHYSIOLOGY EVALUATION     PATHOLOGY EVALUATION      NEUROCOGNITIVE AND NEUROPSYCHOLOGY EVALUATION     Reviewed the neuroimaging independently     Assessment:   83 Years old AA Male with PMHX as above came of the evaluation of " balance issues ".    -  Oa's Right Hip.  - Oa's Bilateral Knees.   - Sensory Polyneuropathy.   - Sensory Ataxia.   Plan:   Patient Neurological Assessment is for major joints Oa's changes / post left Hip replacement / sensory Ataxia - most probable due to Polyneuropathy caused by DM.   The combination of the Oa's ( Knees / Hips ) and the Neuropathy are the cause of his imbalance and falls.     Has had several PT program for balance and strengthening.    It's being evaluated by Cardiology - possible Hips replacement according to Patient.    Personally reviewed CT Scan Head / MRI Brain - 03/ 2025 - no acute changes.     NCS - mostly CTS issues / to me some small fibers Neuropathy most probable is present.  X-Ray cervical spine - 10/ 2024 - Chronic DGD's changes. .     NCS/EMG - 10/ 2024 - There is electrodiagnostic evidence of an acute on chronic radiculopathy of the bilateral L5 and S1 nerve " roots.   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   There was no evidence of a large fiber diabetic polyneuropathy      Labs:  Lipids panel / CBC - 2024 - non significant abnormalities.  Hgb A 1 C: 6.2 to 6.4 in the last 3 years or so.   CMP: Creatinine -  1.7  / GFR - 50     Personally reviewed Lumbar spine - 08/ 2024 - multi DGD's / no acute changes.   Postoperative and mild degenerative changes of the lumbar spine.   No high-grade central canal or neural foraminal canal narrowing suggested.     X-Ray Knees - 01/ 2024 - Mild tricompartmental osteoarthritis right knee.     Ortho Sx - following / steroids injections after conservative treatment / pain management - nerve block ?    MEDICAL/SURGICAL COMORBIDITIES     All relevant medical comorbidities noted and managed by primary care physician and medical care team.      HEALTHY LIFESTYLE AND PREVENTATIVE CARE    The patient to adhere to the age-appropriate health maintenance guidelines including screening tests and vaccinations.   The patient to adhere to  healthy lifestyle, optimal weight, exercise, healthy diet,   good sleep hygiene and avoiding drugs including smoking, alcohol and recreational drugs.    RTC: 6 months.     Please do not hesitate to contact me with any updates, questions or concerns.    I spent a total of 20 minutes on the day of the visit.This includes face to face time and non-face to face time preparing to see the patient (eg, review of tests),   obtaining and/or reviewing separately obtained history, documenting clinical information in the electronic or other health record,   independently interpreting results and communicating results to the patient/family/caregiver, or care coordinator.    Placido Ugalde MD  General Neurology.       [1]   Current Outpatient Medications:     alfuzosin (UROXATRAL) 10 mg Tb24, Take 1 tablet  (10 mg total) by mouth daily with breakfast., Disp: 30 tablet, Rfl: 11    allopurinoL (ZYLOPRIM) 100 MG tablet, TAKE 1 TABLET EVERY DAY, Disp: 90 tablet, Rfl: 3    blood glucose control, low Soln, by Misc.(Non-Drug; Combo Route) route., Disp: , Rfl:     blood-glucose meter (TRUE METRIX AIR GLUCOSE METER) kit, USE AS DIRECTED, Disp: 1 each, Rfl: 0    clotrimazole-betamethasone 1-0.05% (LOTRISONE) cream, Apply topically 2 (two) times daily., Disp: 45 g, Rfl: 0    cyanocobalamin, vitamin B-12, 1,000 mcg Subl, Place 1,000 mcg under the tongue once daily., Disp: 90 tablet, Rfl: 3    diclofenac sodium (VOLTAREN) 1 % Gel, Apply 2 g topically 4 (four) times daily as needed (pain)., Disp: 200 g, Rfl: 2    fluticasone propionate (FLONASE) 50 mcg/actuation nasal spray, 1 spray (50 mcg total) by Each Nostril route once daily., Disp: 16 mL, Rfl: 0    GARLIC EXTRACT ORAL, Take 1 tablet by mouth once daily. Per [facility], Disp: , Rfl:     lactulose (CHRONULAC) 10 gram/15 mL solution, SMARTSI Milliliter(s) By Mouth Daily, Disp: , Rfl:     LIDOcaine (LIDODERM) 5 %, Place 1 patch onto the skin daily as needed (pain). Remove & Discard patch within 12 hours or as directed by MD, Disp: 14 patch, Rfl: 0    melatonin 10 mg Tab, Take 1.5 tablets (15 mg total) by mouth every evening., Disp: 45 tablet, Rfl: 11    metFORMIN (GLUCOPHAGE) 1000 MG tablet, TAKE 1 TABLET TWICE DAILY WITH MEALS, Disp: 180 tablet, Rfl: 0    mometasone 0.1% (ELOCON) 0.1 % cream, Apply topically once daily., Disp: 50 g, Rfl: 2    multivitamin (THERAGRAN) per tablet, Take 1 tablet by mouth once daily., Disp: , Rfl:     pramipexole (MIRAPEX) 0.125 MG tablet, TAKE 1 TABLET EVERY EVENING, Disp: 90 tablet, Rfl: 3    pravastatin (PRAVACHOL) 40 MG tablet, TAKE 1 TABLET EVERY DAY, Disp: 90 tablet, Rfl: 3    sildenafiL (VIAGRA) 100 MG tablet, Take 1 tablet (100 mg total) by mouth daily as needed for Erectile Dysfunction., Disp: 15 tablet, Rfl: 5    solifenacin  (VESICARE) 10 MG tablet, Take 1 tablet (10 mg total) by mouth once daily., Disp: 30 tablet, Rfl: 11    TRUE METRIX GLUCOSE TEST STRIP Strp, USE AS DIRECTED TO CHECK SUGARS, Disp: 300 strip, Rfl: 3    TRUEPLUS LANCETS 33 gauge Misc, USE AS DIRECTED TO CHECK SUGARS, Disp: 300 each, Rfl: 3  [2]   Social History  Socioeconomic History    Marital status:     Number of children: 0    Highest education level: Master's degree (e.g., MA, MS, Olegario, MEd, MSW, CARMEL)   Occupational History    Occupation: retired     Comment: teacher   Tobacco Use    Smoking status: Never     Passive exposure: Never    Smokeless tobacco: Never   Substance and Sexual Activity    Alcohol use: Not Currently    Drug use: No    Sexual activity: Not Currently     Partners: Female   Social History Narrative    . No children. Retired but some part time work - 4 hours a day. 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 Caffeine intake - sugar free cola, Rare coffee intake. Still drives. Does have a Living Will . Has a nephew Jt Gayle, lives in [place]. Has a friend Karina Rossi, locally who could help him.      Social Drivers of Health     Financial Resource Strain: Medium Risk (1/23/2025)    Overall Financial Resource Strain (CARDIA)     Difficulty of Paying Living Expenses: Somewhat hard   Food Insecurity: No Food Insecurity (1/23/2025)    Hunger Vital Sign     Worried About Running Out of Food in the Last Year: Never true     Ran Out of Food in the Last Year: Never true   Transportation Needs: No Transportation Needs (8/15/2023)    PRAPARE - Transportation     Lack of Transportation (Medical): No     Lack of Transportation (Non-Medical): No   Physical Activity: Inactive (1/23/2025)    Exercise Vital Sign     Days of Exercise per Week: 0 days     Minutes of Exercise per Session: 10 min   Stress: Stress Concern Present (1/23/2025)    [employer] - Occupational Stress Questionnaire     Feeling of Stress : Very much    Housing Stability: Low Risk  (8/15/2023)    Housing Stability Vital Sign     Unable to Pay for Housing in the Last Year: No     Number of Places Lived in the Last Year: 1     Unstable Housing in the Last Year: No

## 2025-04-12 ENCOUNTER — HOSPITAL ENCOUNTER (OUTPATIENT)
Dept: RADIOLOGY | Facility: HOSPITAL | Age: 85
Discharge: HOME | End: 2025-04-12
Attending: ORTHOPAEDIC SURGERY
Payer: MEDICARE

## 2025-04-12 DIAGNOSIS — M17.11 PRIMARY OSTEOARTHRITIS OF RIGHT KNEE: ICD-10-CM

## 2025-04-12 PROCEDURE — 73721 MRI JNT OF LWR EXTRE W/O DYE: CPT | Mod: TC,HCNC,RT

## 2025-04-12 PROCEDURE — 73721 MRI JNT OF LWR EXTRE W/O DYE: CPT | Mod: 26,HCNC,RT, | Performed by: RADIOLOGY

## 2025-04-15 ENCOUNTER — OFFICE VISIT (OUTPATIENT)
Dept: NEUROLOGY | Facility: CLINIC | Age: 85
End: 2025-04-15
Payer: MEDICARE

## 2025-04-15 VITALS
WEIGHT: 209.19 LBS | SYSTOLIC BLOOD PRESSURE: 126 MMHG | DIASTOLIC BLOOD PRESSURE: 61 MMHG | HEIGHT: 75 IN | HEART RATE: 89 BPM | BODY MASS INDEX: 26.01 KG/M2

## 2025-04-15 DIAGNOSIS — R26.89 POOR BALANCE: ICD-10-CM

## 2025-04-15 DIAGNOSIS — G60.8 SENSORY POLYNEUROPATHY: Primary | ICD-10-CM

## 2025-04-15 DIAGNOSIS — M47.812 OSTEOARTHRITIS OF CERVICAL SPINE, UNSPECIFIED SPINAL OSTEOARTHRITIS COMPLICATION STATUS: ICD-10-CM

## 2025-04-15 PROCEDURE — 99999 PR PBB SHADOW E&M-EST. PATIENT-LVL IV: CPT | Mod: PBBFAC,HCNC,, | Performed by: PSYCHIATRY & NEUROLOGY

## 2025-04-23 NOTE — PRE-PROCEDURE INSTRUCTIONS
Spoke with patient regarding procedure scheduled on 4.30     Arrival time 1030     Has patient been sick with fever or on antibiotics within the last 7 days? No     Does the patient have any open wounds, sores or rashes? No     Does the patient have any recent fractures? no     Has patient received a vaccination within the last 7 days? No     Received the COVID vaccination? yes     Has the patient stopped all medications as directed? na     Does patient have a pacemaker, defibrillator, or implantable stimulator? No     Does the patient have a ride to and from procedure and someone reliable to remain with patient?  [place]     Is the patient diabetic? YES      Does the patient have sleep apnea? Or use O2 at home? no     Is the patient receiving sedation? Yes      Is the patient instructed to remain NPO beginning at midnight the night before their procedure? yes     Procedure location confirmed with patient? Yes     Covid- Denies signs/symptoms. Instructed to notify PAT/MD if any changes.

## 2025-04-24 ENCOUNTER — OFFICE VISIT (OUTPATIENT)
Dept: INTERNAL MEDICINE | Facility: CLINIC | Age: 85
End: 2025-04-24
Payer: MEDICARE

## 2025-04-24 ENCOUNTER — HOSPITAL ENCOUNTER (OUTPATIENT)
Dept: RADIOLOGY | Facility: HOSPITAL | Age: 85
Discharge: HOME | End: 2025-04-24
Attending: INTERNAL MEDICINE
Payer: MEDICARE

## 2025-04-24 VITALS
OXYGEN SATURATION: 99 % | BODY MASS INDEX: 25.96 KG/M2 | TEMPERATURE: 97 F | DIASTOLIC BLOOD PRESSURE: 74 MMHG | HEART RATE: 90 BPM | SYSTOLIC BLOOD PRESSURE: 136 MMHG | WEIGHT: 207.69 LBS

## 2025-04-24 DIAGNOSIS — E11.3311 TYPE 2 DIABETES MELLITUS WITH MODERATE NONPROLIFERATIVE RETINOPATHY OF RIGHT EYE AND MACULAR EDEMA, UNSPECIFIED WHETHER LONG TERM INSULIN USE: ICD-10-CM

## 2025-04-24 DIAGNOSIS — R06.02 SOB (SHORTNESS OF BREATH): Primary | ICD-10-CM

## 2025-04-24 DIAGNOSIS — K59.09 CHRONIC CONSTIPATION: ICD-10-CM

## 2025-04-24 PROBLEM — N39.0 ACUTE URINARY TRACT INFECTION: Status: RESOLVED | Noted: 2025-03-01 | Resolved: 2025-04-24

## 2025-04-24 PROBLEM — E83.42 HYPOMAGNESEMIA: Status: RESOLVED | Noted: 2025-03-01 | Resolved: 2025-04-24

## 2025-04-24 PROBLEM — E78.5 HYPERLIPIDEMIA: Status: RESOLVED | Noted: 2018-09-14 | Resolved: 2025-04-24

## 2025-04-24 PROBLEM — M06.9 RHEUMATOID ARTHRITIS: Status: RESOLVED | Noted: 2025-03-31 | Resolved: 2025-04-24

## 2025-04-24 PROBLEM — K56.609 SMALL BOWEL OBSTRUCTION: Status: RESOLVED | Noted: 2024-08-14 | Resolved: 2025-04-24

## 2025-04-24 PROBLEM — N28.9 ACUTE RENAL INSUFFICIENCY: Status: RESOLVED | Noted: 2025-03-01 | Resolved: 2025-04-24

## 2025-04-24 PROBLEM — E83.51 HYPOCALCEMIA: Status: RESOLVED | Noted: 2025-03-01 | Resolved: 2025-04-24

## 2025-04-24 PROBLEM — R60.0 EDEMA OF LEG: Status: RESOLVED | Noted: 2024-06-24 | Resolved: 2025-04-24

## 2025-04-24 PROCEDURE — 74018 RADEX ABDOMEN 1 VIEW: CPT | Mod: TC,HCNC,FY,PO

## 2025-04-24 PROCEDURE — 1160F RVW MEDS BY RX/DR IN RCRD: CPT | Mod: HCNC,CPTII,S$GLB, | Performed by: INTERNAL MEDICINE

## 2025-04-24 PROCEDURE — 3288F FALL RISK ASSESSMENT DOCD: CPT | Mod: HCNC,CPTII,S$GLB, | Performed by: INTERNAL MEDICINE

## 2025-04-24 PROCEDURE — 1159F MED LIST DOCD IN RCRD: CPT | Mod: HCNC,CPTII,S$GLB, | Performed by: INTERNAL MEDICINE

## 2025-04-24 PROCEDURE — 1100F PTFALLS ASSESS-DOCD GE2>/YR: CPT | Mod: HCNC,CPTII,S$GLB, | Performed by: INTERNAL MEDICINE

## 2025-04-24 PROCEDURE — 1125F AMNT PAIN NOTED PAIN PRSNT: CPT | Mod: HCNC,CPTII,S$GLB, | Performed by: INTERNAL MEDICINE

## 2025-04-24 PROCEDURE — 74018 RADEX ABDOMEN 1 VIEW: CPT | Mod: 26,HCNC,, | Performed by: RADIOLOGY

## 2025-04-24 PROCEDURE — 3078F DIAST BP <80 MM HG: CPT | Mod: HCNC,CPTII,S$GLB, | Performed by: INTERNAL MEDICINE

## 2025-04-24 PROCEDURE — 99999 PR PBB SHADOW E&M-EST. PATIENT-LVL V: CPT | Mod: PBBFAC,HCNC,, | Performed by: INTERNAL MEDICINE

## 2025-04-24 PROCEDURE — G2211 COMPLEX E/M VISIT ADD ON: HCPCS | Mod: HCNC,S$GLB,, | Performed by: INTERNAL MEDICINE

## 2025-04-24 PROCEDURE — 3075F SYST BP GE 130 - 139MM HG: CPT | Mod: HCNC,CPTII,S$GLB, | Performed by: INTERNAL MEDICINE

## 2025-04-24 PROCEDURE — 99214 OFFICE O/P EST MOD 30 MIN: CPT | Mod: HCNC,S$GLB,, | Performed by: INTERNAL MEDICINE

## 2025-04-24 RX ORDER — LACTULOSE 10 G/15ML
20 SOLUTION ORAL; RECTAL 2 TIMES DAILY
Qty: 1800 ML | Refills: 11 | Status: SHIPPED | OUTPATIENT
Start: 2025-04-24

## 2025-04-24 NOTE — PATIENT INSTRUCTIONS
Patient Education       Guide to Eating When You Have Diabetes   About this topic   This guide will help you control your blood sugar along with exercise and the drugs you are taking. You want to have a balanced amount of carbohydrates, fats, and protein in your meal. Following these diet guidelines may also help control your blood pressure and cholesterol.     What will the results be?   When you follow these diet guidelines, your blood sugar may be easier to keep within the goal blood sugar ranges. Ask your doctor for your goal blood sugar ranges.  What changes to diet are needed?   You need to balance how much carbohydrate, fat, and protein is in your meals. You also need to control the amount or portion size of the food you eat. Eat meals at about the same time every day. Do not skip a meal. Talk to your dietitian about making a personal meal plan for you.     Who should use this diet?   This diet is helpful to people with high blood sugar or diabetes.   What foods are good to eat?   Whole grains like:  1/3 cup (80 grams) brown rice  1/3 cup (80 grams) wild rice  1/3 cup (80 grams) whole wheat pasta  1 slice whole wheat or whole grain bread  3/4 cup (180 grams) high-fiber cereal  1/2 cup (120 grams) cooked oatmeal  1/2 (120 grams) English muffin  Fruits and vegetables like:  1/2 cup (120 grams) sweet potatoes  1/2 cup (120 grams) cooked vegetables, like squash, green beans, cauliflower, carrots, and cabbage  1 cup (240 grams) raw vegetables or salad greens  1 small apples or oranges  1/2 cup (120 grams) unsweetened fruit juice  Proteins like:  1 ounce (30 grams) lean beef or pork  1 ounce (30 grams) chicken, skin removed  1 ounce (30 grams) turkey, skin removed  1 ounce (30 grams) fish  1 ounce (30 grams) low-fat cheese or lunch meat  1/2 cup (120 grams) cooked beans ? black, kidney, chickpeas, or lentils  1 whole egg  What foods should be limited or avoided?   High fat or processed foods  like:  Moya  Sausage  Hot dogs  Processed snacks  Fats and oils like:  Margarine  Salad dressings  Foods that are high in salt like:  Table salt  Salted breads, rolls, crackers  Commercially-prepared potatoes and vegetable mixes  Canned vegetables and juices  Canned soups  Smoked, cured, or salted meats  Starches that are not whole grain like:  White rice  White potatoes  French fries  Pasta  White bread  Sugary cereals  Instant oatmeal  Baked goods, pastries  Croissants  What can be done to prevent this health problem?   Type 1 diabetes is a lifelong problem and you cannot prevent it. Type 2 diabetes can be prevented or delayed with lifestyle changes. You can still lead a normal life. Diabetes can be managed through diet, exercise, and drugs. Family members and friends can help you practice good health behaviors.  When do I need to call the doctor?   Blood sugar level is above 240 for more than a day  Blood sugar level drops to less than 40  Abnormal urine test results  Trouble breathing  Very sleepy  Throw up more than once  Many loose stools  Questions about your diet plan  Helpful tips   Try to eat smaller portions of a healthy balanced diet throughout the day. Take time to plan your meals and snacks.  Where can I learn more?   American Diabetes Association  https://www.cdc.gov/diabetes/managing/eat-well/meal-plan-method.html   HelpGuide.org  http://www.helpguide.org/home-pages/healthy-eating.htm   HelpGuide.org  http://www.helpguide.org/articles/diet-weight-loss/diabetes-diet-and-food-tips.htm   Last Reviewed Date   2021-07-21  Consumer Information Use and Disclaimer   This information is not specific medical advice and does not replace information you receive from your health care provider. This is only a brief summary of general information. It does NOT include all information about conditions, illnesses, injuries, tests, procedures, treatments, therapies, discharge instructions or life-style choices that  may apply to you. You must talk with your health care provider for complete information about your health and treatment options. This information should not be used to decide whether or not to accept your health care providers advice, instructions or recommendations. Only your health care provider has the knowledge and training to provide advice that is right for you.   Copyright   Copyright © 2021 [employer] and its affiliates and/or licensors. All rights reserved.

## 2025-04-24 NOTE — PROGRESS NOTES
Subjective:       Patient ID: Srinath Mcknight is a 84 y.o. male.    Chief Complaint: Follow-up      HPI:  History of Present Illness    Patient presents today for follow up of shortness of breath and constipation    He reports experiencing shortness of breath with exertion, particularly when walking or transitioning from car to house, requiring rest periods. Symptoms have been worsening over the past week. He denies significant breathing difficulties when lying down.    He reports severe constipation with bowel movements occurring approximately every 5 days. Miralax and Lactulose were previously ineffective despite daily use. He currently takes Clearlax daily, which has provided some relief but has not fully resolved the issue.    He consumes approximately 48 ounces of water daily through three 16-ounce bottles, plus additional fluids from juices and other beverages. He aims to drink 64 ounces of water per day.         Review of Systems   Constitutional:  Positive for fatigue. Negative for fever and unexpected weight change.   Respiratory:  Positive for shortness of breath. Negative for cough and wheezing.    Cardiovascular:  Negative for chest pain and palpitations.   Gastrointestinal:  Positive for constipation. Negative for diarrhea, nausea and vomiting.   Genitourinary:  Negative for dysuria and hematuria.       Objective:   /74   Pulse 90   Temp 97 °F (36.1 °C) (Tympanic)   Wt 94.2 kg (207 lb 10.8 oz)   SpO2 99%   BMI 25.96 kg/m²      Physical Exam  Vitals reviewed.   Constitutional:       Appearance: He is well-developed.   HENT:      Head: Normocephalic and atraumatic.      Right Ear: Tympanic membrane, ear canal and external ear normal.      Left Ear: Tympanic membrane, ear canal and external ear normal.   Eyes:      Pupils: Pupils are equal, round, and reactive to light.   Neck:      Thyroid: No thyromegaly.   Cardiovascular:      Rate and Rhythm: Normal rate and regular rhythm.      Heart  sounds: Normal heart sounds. No murmur heard.     No friction rub. No gallop.      Comments: Trace edema in the ankles  Pulmonary:      Effort: Pulmonary effort is normal.      Breath sounds: Normal breath sounds. No wheezing or rales.      Comments: No crackles. Good air movement  Abdominal:      General: Bowel sounds are normal. There is no distension.      Palpations: Abdomen is soft.      Tenderness: There is no abdominal tenderness.   Musculoskeletal:      Cervical back: Neck supple.   Psychiatric:         Mood and Affect: Mood normal.         No visits with results within 2 Week(s) from this visit.   Latest known visit with results is:   Hospital Outpatient Visit on 04/02/2025   Component Date Value    Right arm systolic blood* 04/02/2025 136.00     Right arm diastolic bloo* 04/02/2025 82.00     Left arm systolic blood * 04/02/2025 130.00     Left arm diastolic blood* 04/02/2025 80.00     Right CCA prox sys 04/02/2025 106     Right CCA prox bustamante 04/02/2025 16     Right CCA mid sys 04/02/2025 117     Right CCA mid bustamante 04/02/2025 20     Right CCA dist sys 04/02/2025 79     Right CCA dist bustamante 04/02/2025 17     Rigth CBA sys 04/02/2025 62     Right CBA bustamante 04/02/2025 12     Right ICA prox sys 04/02/2025 69     Right ICA prox bustamante 04/02/2025 13     Right ICA mid sys 04/02/2025 52     Right ICA mid bustamante 04/02/2025 16     Right ICA dist sys 04/02/2025 73     Right ICA dist bustamante 04/02/2025 25     Right ECA sys 04/02/2025 70     Right ECA bustamante 04/02/2025 7     Right vertebral sys 04/02/2025 46     Right vertebral bustamante 04/02/2025 14     Right ICA/CCA ratio 04/02/2025 0.92     Right Highest ICA 04/02/2025 73.00     Right Highest EDV 04/02/2025 25.00     Right Highest CCA 04/02/2025 117     Left CCA prox sys 04/02/2025 119     Left CCA prox bustamante 04/02/2025 16     Left CCA mid sys 04/02/2025 105     Left CCA mid bustamante 04/02/2025 16     Left CCA dist sys 04/02/2025 93     Left CCA dist bustamante 04/02/2025 17     Left CBA sys  04/02/2025 77     Left CBA bustamante 04/02/2025 15     Left ICA prox sys 04/02/2025 53     Left ICA prox bustamante 04/02/2025 12     Left ICA mid sys 04/02/2025 60     Left ICA mid bustamante 04/02/2025 18     Left ICA dist sys 04/02/2025 40     Left ICA dist bustamante 04/02/2025 13     Left ECA sys 04/02/2025 75     Left ECA bustaamnte 04/02/2025 15     Left vertebral sys 04/02/2025 50     Left vertebral bustamante 04/02/2025 7     Left ICA/CCA ratio 04/02/2025 0.65     Left Highest ICA 04/02/2025 60.00     LT Highest EDV 04/02/2025 18.00     Left Highest CCA 04/02/2025 119        Assessment:       1. SOB (shortness of breath)    2. Chronic constipation    3. 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  Most recent a1c is     Lab Results   Component Value Date    HGBA1C 6.1 (H) 01/24/2025    .      We will continue the current regimen.  Focus on a low carbohydrate diet and consistent exercise.      [place] was seen today for follow-up.    Diagnoses and all orders for this visit:    SOB (shortness of breath)  Comments:  No smoking history. Normal pfts about a year ago.  suspect cardiac or deconditioning.  Has echo and stress test in 4 days    Chronic constipation  Comments:  increase chronulac to 20 grams twice daily. Drink plenty of water.  Orders:  -     lactulose (CHRONULAC) 10 gram/15 mL solution; Take 30 mLs (20 g total) by mouth 2 (two) times daily.  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  - Auscultation of the lungs was performed.  - Blood pressure readings, taken thrice daily, have been within normal range.  - To further investigate the shortness of breath, an echocardiogram and stress test have been scheduled for the following Monday.    SHORTNESS OF BREATH:  - Patient denies orthopnea.  - Given the patient's non-smoking history and previous normal pulmonary function tests, further cardiac investigation is planned.    CHRONIC CONSTIPATION:  - Patient reports difficulty with bowel movements, sometimes going a week without defecation.  - Currently taking Lactulose daily, with bowel movements occurring approximately every 5 days.  - Current regimen of once-daily small cup dosage is insufficient.  - Abdominal X-ray ordered to assess stool burden.  - Lactulose dosage increased to a higher dose twice daily, with prescription sent to the pharmacy.  - If X-ray shows significant stool burden, aggressive bowel prep will be considered.  - If X-ray shows typical stool burden, dose and frequency of Lactulose will be increased.  - Patient advised to continue efforts to drink at least 64 oz of water daily.    TYPE 2 DIABETES:  - Blood glucose levels appear well-controlled.  - Patient advised to continue current diabetes management plan.    FOLLOW-UP:  - Patient instructed to follow up after echocardiogram and stress test on Monday.  - Patient to contact the office after X-ray results to determine if a more aggressive bowel regimen is needed.         Follow up in about 3 months (around 7/24/2025), or if symptoms worsen or fail to improve, for DM, HTN, HLP, with [employer] Roger.    This note was generated with the assistance of ambient listening technology. Verbal consent was obtained by the patient and accompanying visitor(s) for the recording of patient appointment to facilitate this note

## 2025-04-24 NOTE — ASSESSMENT & PLAN NOTE
Diabetes continues to do well at this time.  Most recent a1c is     Lab Results   Component Value Date    HGBA1C 6.1 (H) 01/24/2025    .      We will continue the current regimen.  Focus on a low carbohydrate diet and consistent exercise.

## 2025-04-28 ENCOUNTER — HOSPITAL ENCOUNTER (OUTPATIENT)
Dept: RADIOLOGY | Facility: HOSPITAL | Age: 85
Discharge: HOME | End: 2025-04-28
Attending: INTERNAL MEDICINE
Payer: MEDICARE

## 2025-04-28 ENCOUNTER — HOSPITAL ENCOUNTER (OUTPATIENT)
Dept: PULMONOLOGY | Facility: HOSPITAL | Age: 85
Discharge: HOME | End: 2025-04-28
Attending: INTERNAL MEDICINE
Payer: MEDICARE

## 2025-04-28 ENCOUNTER — HOSPITAL ENCOUNTER (OUTPATIENT)
Dept: CARDIOLOGY | Facility: HOSPITAL | Age: 85
Discharge: HOME | End: 2025-04-28
Attending: INTERNAL MEDICINE
Payer: MEDICARE

## 2025-04-28 VITALS
DIASTOLIC BLOOD PRESSURE: 74 MMHG | BODY MASS INDEX: 25.74 KG/M2 | HEART RATE: 90 BPM | HEIGHT: 75 IN | SYSTOLIC BLOOD PRESSURE: 136 MMHG | WEIGHT: 207 LBS

## 2025-04-28 DIAGNOSIS — E11.51 TYPE 2 DIABETES MELLITUS WITH DIABETIC PERIPHERAL ANGIOPATHY WITHOUT GANGRENE, WITHOUT LONG-TERM CURRENT USE OF INSULIN: ICD-10-CM

## 2025-04-28 DIAGNOSIS — E11.22 DIABETES MELLITUS WITH STAGE 3 CHRONIC KIDNEY DISEASE: ICD-10-CM

## 2025-04-28 DIAGNOSIS — E11.59 HYPERTENSION ASSOCIATED WITH DIABETES: ICD-10-CM

## 2025-04-28 DIAGNOSIS — I15.2 HYPERTENSION ASSOCIATED WITH DIABETES: ICD-10-CM

## 2025-04-28 DIAGNOSIS — N18.30 DIABETES MELLITUS WITH STAGE 3 CHRONIC KIDNEY DISEASE: ICD-10-CM

## 2025-04-28 DIAGNOSIS — R06.09 DYSPNEA ON EXERTION: ICD-10-CM

## 2025-04-28 DIAGNOSIS — N18.30 STAGE 3 CHRONIC KIDNEY DISEASE, UNSPECIFIED WHETHER STAGE 3A OR 3B CKD: ICD-10-CM

## 2025-04-28 DIAGNOSIS — R06.09 OTHER FORM OF DYSPNEA: ICD-10-CM

## 2025-04-28 DIAGNOSIS — I70.0 ATHEROSCLEROSIS OF AORTA: ICD-10-CM

## 2025-04-28 DIAGNOSIS — M47.26 OSTEOARTHRITIS OF SPINE WITH RADICULOPATHY, LUMBAR REGION: ICD-10-CM

## 2025-04-28 DIAGNOSIS — E11.42 TYPE 2 DIABETES MELLITUS WITH DIABETIC POLYNEUROPATHY, WITHOUT LONG-TERM CURRENT USE OF INSULIN: ICD-10-CM

## 2025-04-28 DIAGNOSIS — I70.0 AORTIC CALCIFICATION: ICD-10-CM

## 2025-04-28 DIAGNOSIS — E78.2 COMBINED HYPERLIPIDEMIA ASSOCIATED WITH TYPE 2 DIABETES MELLITUS: ICD-10-CM

## 2025-04-28 DIAGNOSIS — E11.69 COMBINED HYPERLIPIDEMIA ASSOCIATED WITH TYPE 2 DIABETES MELLITUS: ICD-10-CM

## 2025-04-28 LAB
AORTIC ROOT ANNULUS: 3.6 CM
ASCENDING AORTA: 3.4 CM
AV INDEX (PROSTH): 0.77
AV MEAN GRADIENT: 4 MMHG
AV PEAK GRADIENT: 6 MMHG
AV REGURGITATION PRESSURE HALF TIME: 857 MS
AV VALVE AREA BY VELOCITY RATIO: 2.4 CM²
AV VALVE AREA: 2.4 CM²
AV VELOCITY RATIO: 0.75
BSA FOR ECHO PROCEDURE: 2.23 M2
CV ECHO LV RWT: 0.7 CM
CV STRESS BASE HR: 68 BPM
DOP CALC AO PEAK VEL: 1.2 M/S
DOP CALC AO VTI: 27.3 CM
DOP CALC LVOT AREA: 3.1 CM2
DOP CALC LVOT DIAMETER: 2 CM
DOP CALC LVOT PEAK VEL: 0.9 M/S
DOP CALC LVOT STROKE VOLUME: 65.9 CM3
DOP CALC MV VTI: 20.4 CM
DOP CALC RVOT PEAK VEL: 0.62 M/S
DOP CALC RVOT VTI: 13.7 CM
DOP CALCLVOT PEAK VEL VTI: 21 CM
E WAVE DECELERATION TIME: 360 MSEC
E/A RATIO: 0.56
E/E' RATIO: 9 M/S
ECHO LV POSTERIOR WALL: 1.3 CM (ref 0.6–1.1)
EJECTION FRACTION- HIGH: 73 %
END DIASTOLIC INDEX-HIGH: 165 ML/M2
END DIASTOLIC INDEX-LOW: 101 ML/M2
END SYSTOLIC INDEX-HIGH: 64 ML/M2
END SYSTOLIC INDEX-LOW: 28 ML/M2
FRACTIONAL SHORTENING: 24.3 % (ref 28–44)
INTERVENTRICULAR SEPTUM: 1.6 CM (ref 0.6–1.1)
IVC DIAMETER: 1.79 CM
IVRT: 69 MSEC
LA MAJOR: 4.6 CM
LA MINOR: 4.6 CM
LA WIDTH: 2.8 CM
LEFT ATRIUM SIZE: 2.9 CM
LEFT ATRIUM VOLUME INDEX: 14 ML/M2
LEFT ATRIUM VOLUME: 32 CM3
LEFT INTERNAL DIMENSION IN SYSTOLE: 2.8 CM (ref 2.1–4)
LEFT VENTRICLE DIASTOLIC VOLUME INDEX: 26.01 ML/M2
LEFT VENTRICLE DIASTOLIC VOLUME: 58 ML
LEFT VENTRICLE MASS INDEX: 88.6 G/M2
LEFT VENTRICLE SYSTOLIC VOLUME INDEX: 13 ML/M2
LEFT VENTRICLE SYSTOLIC VOLUME: 29 ML
LEFT VENTRICULAR INTERNAL DIMENSION IN DIASTOLE: 3.7 CM (ref 3.5–6)
LEFT VENTRICULAR MASS: 197.7 G
LV LATERAL E/E' RATIO: 8.3 M/S
LV SEPTAL E/E' RATIO: 10 M/S
LVED V (TEICH): 57.84 ML
LVES V (TEICH): 29.43 ML
LVOT MG: 2.16 MMHG
LVOT MV: 0.73 CM/S
MV MEAN GRADIENT: 1 MMHG
MV PEAK A VEL: 0.89 M/S
MV PEAK E VEL: 0.5 M/S
MV PEAK GRADIENT: 3 MMHG
MV STENOSIS PRESSURE HALF TIME: 104.42 MS
MV VALVE AREA BY CONTINUITY EQUATION: 3.23 CM2
MV VALVE AREA P 1/2 METHOD: 2.11 CM2
NUC REST EJECTION FRACTION: 75
NUC STRESS EJECTION FRACTION: 52 %
OHS CV CPX 85 PERCENT MAX PREDICTED HEART RATE MALE: 117
OHS CV CPX MAX PREDICTED HEART RATE: 138
OHS CV CPX PATIENT IS FEMALE: 0
OHS CV CPX PATIENT IS MALE: 1
OHS CV CPX PEAK HEAR RATE: 83 BPM
OHS CV CPX PERCENT MAX PREDICTED HEART RATE ACHIEVED: 60
OHS CV INITIAL DOSE: 10.5 MCG/KG/MIN
OHS CV PEAK DOSE: 33.3 MCG/KG/MIN
PISA AR MAX VEL: 2.42 M/S
PISA MRMAX VEL: 2.47 M/S
PISA TR MAX VEL: 2.2 M/S
PV MEAN GRADIENT: 1 MMHG
PV PEAK GRADIENT: 2
RA MAJOR: 3.08 CM
RA PRESSURE ESTIMATED: 3 MMHG
RA WIDTH: 2.4 CM
RETIRED EF AND QEF - SEE NOTES: 59 %
RV TB RVSP: 5 MMHG
STJ: 3.4 CM
TDI LATERAL: 0.06 M/S
TDI SEPTAL: 0.05 M/S
TDI: 0.06 M/S
TR MAX PG: 19 MMHG
TRICUSPID ANNULAR PLANE SYSTOLIC EXCURSION: 2 CM
TV REST PULMONARY ARTERY PRESSURE: 22 MMHG
Z-SCORE OF LEFT VENTRICULAR DIMENSION IN END DIASTOLE: -7.56
Z-SCORE OF LEFT VENTRICULAR DIMENSION IN END SYSTOLE: -4.23

## 2025-04-28 PROCEDURE — 78452 HT MUSCLE IMAGE SPECT MULT: CPT | Mod: HCNC

## 2025-04-28 PROCEDURE — 63600175 PHARM REV CODE 636 W HCPCS: Mod: HCNC | Performed by: INTERNAL MEDICINE

## 2025-04-28 PROCEDURE — 93306 TTE W/DOPPLER COMPLETE: CPT | Mod: 26,HCNC,, | Performed by: INTERNAL MEDICINE

## 2025-04-28 PROCEDURE — 78452 HT MUSCLE IMAGE SPECT MULT: CPT | Mod: 26,HCNC,, | Performed by: INTERNAL MEDICINE

## 2025-04-28 PROCEDURE — A9502 TC99M TETROFOSMIN: HCPCS | Mod: HCNC | Performed by: INTERNAL MEDICINE

## 2025-04-28 PROCEDURE — 93306 TTE W/DOPPLER COMPLETE: CPT | Mod: HCNC

## 2025-04-28 PROCEDURE — 93016 CV STRESS TEST SUPVJ ONLY: CPT | Mod: HCNC,,, | Performed by: INTERNAL MEDICINE

## 2025-04-28 PROCEDURE — 93018 CV STRESS TEST I&R ONLY: CPT | Mod: HCNC,,, | Performed by: INTERNAL MEDICINE

## 2025-04-28 RX ORDER — REGADENOSON 0.08 MG/ML
0.4 INJECTION, SOLUTION INTRAVENOUS ONCE
Status: COMPLETED | OUTPATIENT
Start: 2025-04-28 | End: 2025-04-28

## 2025-04-28 RX ADMIN — TETROFOSMIN 33.3 MILLICURIE: 1.38 INJECTION, POWDER, LYOPHILIZED, FOR SOLUTION INTRAVENOUS at 09:04

## 2025-04-28 RX ADMIN — REGADENOSON 0.4 MG: 0.08 INJECTION, SOLUTION INTRAVENOUS at 09:04

## 2025-04-28 RX ADMIN — TETROFOSMIN 10.5 MILLICURIE: 1.38 INJECTION, POWDER, LYOPHILIZED, FOR SOLUTION INTRAVENOUS at 08:04

## 2025-04-30 ENCOUNTER — HOSPITAL ENCOUNTER (OUTPATIENT)
Facility: HOSPITAL | Age: 85
Discharge: HOME | End: 2025-04-30
Attending: ANESTHESIOLOGY | Admitting: ANESTHESIOLOGY
Payer: MEDICARE

## 2025-04-30 VITALS
SYSTOLIC BLOOD PRESSURE: 166 MMHG | RESPIRATION RATE: 16 BRPM | BODY MASS INDEX: 25.43 KG/M2 | OXYGEN SATURATION: 95 % | HEART RATE: 68 BPM | TEMPERATURE: 98 F | HEIGHT: 75 IN | DIASTOLIC BLOOD PRESSURE: 79 MMHG | WEIGHT: 204.5 LBS

## 2025-04-30 DIAGNOSIS — M16.12 OSTEOARTHRITIS OF LEFT HIP: ICD-10-CM

## 2025-04-30 LAB — POCT GLUCOSE: 117 MG/DL (ref 70–110)

## 2025-04-30 PROCEDURE — 25500020 PHARM REV CODE 255: Mod: HCNC | Performed by: ANESTHESIOLOGY

## 2025-04-30 PROCEDURE — 63600175 PHARM REV CODE 636 W HCPCS: Mod: HCNC | Performed by: ANESTHESIOLOGY

## 2025-04-30 PROCEDURE — 25000003 PHARM REV CODE 250: Mod: HCNC | Performed by: ANESTHESIOLOGY

## 2025-04-30 PROCEDURE — 64447 NJX AA&/STRD FEMORAL NRV IMG: CPT | Mod: HCNC,51,RT, | Performed by: ANESTHESIOLOGY

## 2025-04-30 PROCEDURE — 64447 NJX AA&/STRD FEMORAL NRV IMG: CPT | Mod: HCNC,RT | Performed by: ANESTHESIOLOGY

## 2025-04-30 PROCEDURE — 64450 NJX AA&/STRD OTHER PN/BRANCH: CPT | Mod: HCNC,RT | Performed by: ANESTHESIOLOGY

## 2025-04-30 PROCEDURE — 64450 NJX AA&/STRD OTHER PN/BRANCH: CPT | Mod: HCNC,RT,, | Performed by: ANESTHESIOLOGY

## 2025-04-30 RX ORDER — FENTANYL CITRATE 50 UG/ML
INJECTION, SOLUTION INTRAMUSCULAR; INTRAVENOUS
Status: DISCONTINUED | OUTPATIENT
Start: 2025-04-30 | End: 2025-04-30 | Stop reason: HOSPADM

## 2025-04-30 RX ORDER — DEXAMETHASONE SODIUM PHOSPHATE 10 MG/ML
INJECTION, SOLUTION INTRA-ARTICULAR; INTRALESIONAL; INTRAMUSCULAR; INTRAVENOUS; SOFT TISSUE
Status: DISCONTINUED | OUTPATIENT
Start: 2025-04-30 | End: 2025-04-30 | Stop reason: HOSPADM

## 2025-04-30 RX ORDER — BUPIVACAINE HYDROCHLORIDE 2.5 MG/ML
INJECTION, SOLUTION EPIDURAL; INFILTRATION; INTRACAUDAL; PERINEURAL
Status: DISCONTINUED | OUTPATIENT
Start: 2025-04-30 | End: 2025-04-30 | Stop reason: HOSPADM

## 2025-04-30 RX ORDER — SODIUM BICARBONATE 1 MEQ/ML
SYRINGE (ML) INTRAVENOUS
Status: DISCONTINUED | OUTPATIENT
Start: 2025-04-30 | End: 2025-04-30 | Stop reason: HOSPADM

## 2025-04-30 NOTE — DISCHARGE SUMMARY
Discharge Note  Short Stay      SUMMARY     Admit Date: 4/30/2025    Attending Physician: Hossein Brooks MD        Discharge Physician: Hossein Brooks MD        Discharge Date: 4/30/2025 11:26 AM    Procedure(s) (LRB):  right femoral/ obturator nerve block (Right)    Final Diagnosis: Primary osteoarthritis of right hip [M16.11]    Disposition: Home or self care    Patient Instructions:   Current Discharge Medication List        CONTINUE these medications which have NOT CHANGED    Details   alfuzosin (UROXATRAL) 10 mg Tb24 Take 1 tablet (10 mg total) by mouth daily with breakfast.  Qty: 30 tablet, Refills: 11      allopurinoL (ZYLOPRIM) 100 MG tablet TAKE 1 TABLET EVERY DAY  Qty: 90 tablet, Refills: 3      aspirin 81 MG Chew Take 81 mg by mouth once daily. Per Dr. Melton (Cardiology)      cyanocobalamin, vitamin B-12, 1,000 mcg Subl Place 1,000 mcg under the tongue once daily.  Qty: 90 tablet, Refills: 3    Associated Diagnoses: Nutritional anemia      melatonin 10 mg Tab Take 1.5 tablets (15 mg total) by mouth every evening.  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      pravastatin (PRAVACHOL) 40 MG tablet TAKE 1 TABLET EVERY DAY  Qty: 90 tablet, Refills: 3      solifenacin (VESICARE) 10 MG tablet Take 1 tablet (10 mg total) by mouth once daily.  Qty: 30 tablet, Refills: 11      blood glucose control, low Soln by Misc.(Non-Drug; Combo Route) route.      blood-glucose meter (TRUE METRIX AIR GLUCOSE METER) kit USE AS DIRECTED  Qty: 1 each, Refills: 0      clotrimazole-betamethasone 1-0.05% (LOTRISONE) cream Apply topically 2 (two) times daily.  Qty: 45 g, Refills: 0      diclofenac sodium (VOLTAREN) 1 % Gel Apply 2 g topically 4 (four) times daily as needed (pain).  Qty: 200 g, Refills: 2     Associated Diagnoses: Chronic pain of right knee      fluticasone propionate (FLONASE) 50 mcg/actuation nasal spray 1 spray (50 mcg total) by Each Nostril route once daily.  Qty: 16 mL, Refills: 0    Associated Diagnoses: Viral URI with cough; Sinus congestion; Post-nasal drip      GARLIC EXTRACT ORAL Take 1 tablet by mouth once daily. Per Negin LIAO      lactulose (CHRONULAC) 10 gram/15 mL solution Take 30 mLs (20 g total) by mouth 2 (two) times daily.  Qty: 1800 mL, Refills: 11    Associated Diagnoses: Chronic constipation      LIDOcaine (LIDODERM) 5 % Place 1 patch onto the skin daily as needed (pain). Remove & Discard patch within 12 hours or as directed by MD  Qty: 14 patch, Refills: 0      mometasone 0.1% (ELOCON) 0.1 % cream Apply topically once daily.  Qty: 50 g, Refills: 2    Associated Diagnoses: Pruritus      pramipexole (MIRAPEX) 0.125 MG tablet TAKE 1 TABLET EVERY EVENING  Qty: 90 tablet, Refills: 3    Associated Diagnoses: PLMD (periodic limb movement disorder)      sildenafiL (VIAGRA) 100 MG tablet Take 1 tablet (100 mg total) by mouth daily as needed for Erectile Dysfunction.  Qty: 15 tablet, Refills: 5    Associated Diagnoses: Erectile dysfunction due to arterial insufficiency      TRUE METRIX GLUCOSE TEST STRIP Strp USE AS DIRECTED TO CHECK SUGARS  Qty: 300 strip, Refills: 3    Associated Diagnoses: Type 2 diabetes mellitus with diabetic polyneuropathy, without long-term current use of insulin      TRUEPLUS LANCETS 33 gauge Misc USE AS DIRECTED TO CHECK SUGARS  Qty: 300 each, Refills: 3                 Discharge Diagnosis: Primary osteoarthritis of right hip [M16.11]  Condition on Discharge: Stable with no complications to procedure   Diet on Discharge: Same as before.  Activity: as per instruction sheet.  Discharge to: Home with a responsible adult.  Follow up: 2-4 weeks       Please call the office at [phone] if you experience any weakness or loss of sensation, fever > 101.5,  pain uncontrolled with oral medications, persistent nausea/vomiting/or diarrhea, redness or drainage from the incisions, or any other worrisome concerns. If physician on call was not reached or could not communicate with our office for any reason please go to the nearest emergency department

## 2025-04-30 NOTE — OP NOTE
Femoral and obturator sensory branch nerve block     Date of procedure: 04/30/2025     Time-out taken to identify patient and procedure side prior to starting the procedure.      PROCEDURE:   Femoral and Obturator sensory branch nerve block     REASON FOR PROCEDURE: Osteoarthritis of right hip, unspecified osteoarthritis type      PHYSICIAN: Hossein Brooks MD     MEDICATIONS INJECTED: 1.5 mL Bupivacaine 0.25% and 10mg decadron at each site     LOCAL ANESTHETIC USED: Xylocaine 1% 5mL     SEDATION MEDICATIONS: None     ESTIMATED BLOOD LOSS: None.     COMPLICATIONS: None.     TECHNIQUE: Laying in the supine position, the patient was prepped and draped in the usual sterile fashion using ChloraPrep and fenestrated drape. The area was determined under fluoroscopy. Local Xylocaine was injected by raising a wheel and going down to the periosteum using a 27-gauge hypodermic needle. The 3.5 inch spinal needle was introduced into the approximate areas of the sensory branch of the femoral nerve to the hip, superior medially to the femoral head and the sensory branch of the obturator nerves to the hip at the incisura. The incisura needle was approached from the medial aspect of the thigh. Once os was contacted and the final needle tip position confirmed on fluoroscopy, contrast was was applied to confirm no intravascular placement.. After negative aspiration and no paresthesias there was injection of 1.5 mL of 0.25% bupivacaine and 20 mg decadron into each of these areas for a total volume of 3 mL of 0.25% bupivacaine right. Needle was withdrawn and a sterile band-aid applied to the skin.     Anesthesia:   Conscious sedation provided by M.D    The patient was monitored with continuous pulse oximetry, EKG, and intermittent blood pressure monitors.  The patient was hemodynamically stable throughout the entire process was responsive to voice, and breathing spontaneously.  Supplemental O2 was provided at 2L/min via nasal cannula.   Patient was comfortable for the duration of the procedure. (See nurse documentation and case log for sedation time)    There was a total of 0mg IV Midazolam and 50mcg Fentanyl titrated for the procedure     Patient was then observed for hemodynamic stability in the recovery room for 30 minutes prior to discharge.

## 2025-04-30 NOTE — DISCHARGE INSTRUCTIONS

## 2025-05-01 NOTE — TELEPHONE ENCOUNTER
No care due was identified.  [facility] Embedded Care Due Messages. Reference number: [id number].   5/01/2025 2:35:31 PM CDT

## 2025-05-02 DIAGNOSIS — E11.59 HYPERTENSION ASSOCIATED WITH DIABETES: Primary | ICD-10-CM

## 2025-05-02 DIAGNOSIS — I15.2 HYPERTENSION ASSOCIATED WITH DIABETES: Primary | ICD-10-CM

## 2025-05-02 RX ORDER — LOSARTAN POTASSIUM 50 MG/1
50 TABLET ORAL
Qty: 90 TABLET | Refills: 3 | Status: SHIPPED | OUTPATIENT
Start: 2025-05-02

## 2025-05-02 RX ORDER — METFORMIN HYDROCHLORIDE 1000 MG/1
1000 TABLET ORAL 2 TIMES DAILY WITH MEALS
Qty: 180 TABLET | Refills: 0 | Status: SHIPPED | OUTPATIENT
Start: 2025-05-02

## 2025-05-05 ENCOUNTER — OFFICE VISIT (OUTPATIENT)
Dept: ORTHOPEDICS | Facility: CLINIC | Age: 85
End: 2025-05-05
Payer: MEDICARE

## 2025-05-05 VITALS — BODY MASS INDEX: 25.44 KG/M2 | HEIGHT: 75 IN | WEIGHT: 204.56 LBS

## 2025-05-05 DIAGNOSIS — M96.89 POSTOPERATIVE HETEROTOPIC OSSIFICATION: ICD-10-CM

## 2025-05-05 DIAGNOSIS — Z98.1 ADJACENT SEGMENT DISEASE OF LUMBAR SPINE WITH HISTORY OF FUSION PROCEDURE: ICD-10-CM

## 2025-05-05 DIAGNOSIS — M51.369 ADJACENT SEGMENT DISEASE OF LUMBAR SPINE WITH HISTORY OF FUSION PROCEDURE: ICD-10-CM

## 2025-05-05 DIAGNOSIS — M61.50 POSTOPERATIVE HETEROTOPIC OSSIFICATION: ICD-10-CM

## 2025-05-05 DIAGNOSIS — M22.41 CHONDROMALACIA PATELLAE OF RIGHT KNEE: ICD-10-CM

## 2025-05-05 DIAGNOSIS — Z96.642 HX OF TOTAL HIP ARTHROPLASTY, LEFT: ICD-10-CM

## 2025-05-05 DIAGNOSIS — M16.11 ARTHRITIS OF RIGHT HIP: Primary | ICD-10-CM

## 2025-05-05 DIAGNOSIS — M51.362 DEGENERATION OF INTERVERTEBRAL DISC OF LUMBAR REGION WITH DISCOGENIC BACK PAIN AND LOWER EXTREMITY PAIN: ICD-10-CM

## 2025-05-05 DIAGNOSIS — M22.41 CHONDROMALACIA PATELLAE, RIGHT KNEE: ICD-10-CM

## 2025-05-05 PROCEDURE — 99999 PR PBB SHADOW E&M-EST. PATIENT-LVL III: CPT | Mod: PBBFAC,HCNC,, | Performed by: ORTHOPAEDIC SURGERY

## 2025-05-05 PROCEDURE — 1160F RVW MEDS BY RX/DR IN RCRD: CPT | Mod: CPTII,HCNC,S$GLB, | Performed by: ORTHOPAEDIC SURGERY

## 2025-05-05 PROCEDURE — 1101F PT FALLS ASSESS-DOCD LE1/YR: CPT | Mod: CPTII,HCNC,S$GLB, | Performed by: ORTHOPAEDIC SURGERY

## 2025-05-05 PROCEDURE — 3288F FALL RISK ASSESSMENT DOCD: CPT | Mod: CPTII,HCNC,S$GLB, | Performed by: ORTHOPAEDIC SURGERY

## 2025-05-05 PROCEDURE — 1125F AMNT PAIN NOTED PAIN PRSNT: CPT | Mod: CPTII,HCNC,S$GLB, | Performed by: ORTHOPAEDIC SURGERY

## 2025-05-05 PROCEDURE — 1159F MED LIST DOCD IN RCRD: CPT | Mod: CPTII,HCNC,S$GLB, | Performed by: ORTHOPAEDIC SURGERY

## 2025-05-05 PROCEDURE — 99214 OFFICE O/P EST MOD 30 MIN: CPT | Mod: HCNC,S$GLB,, | Performed by: ORTHOPAEDIC SURGERY

## 2025-05-05 NOTE — PATIENT INSTRUCTIONS
Your MRI did not show any meniscus tear just mild arthritis underneath the kneecap   Your x-ray show severe arthritis of your right hip  You already had right femoral and obturator nerve block on 04/30/2025 and you said that this made you worst  You already had intra-articular hip injection 12/16/2024 without too much relief  Most of her pain is radiating down to her knee from your hip since you have severely limited motion   You recently seen Dr. Melton/cardiology they did a test and you have to follow-up with him   My recommendation is to have the right total hip replacement      total knee or total hip replacement is considered outpatient surgery by the government right now.  You will have your surgery under spinal or general anesthetic.    It will take roughly an hour and half to 2 to perform.  You will be going home the same day after surgery.    We will get you up and walking an hour or so after surgery in recovery room and will arrange for home health and physical therapy to come to your house by the next day.    You will receive home health and home physical therapy for 2 weeks and then outpatient after that.   You would need to have family members to help you at home otherwise you can not have surgery because this is considered outpatient by the government   It will take roughly 3-6 months for complete healing to occur  Most joint replacements studies have shown improvement up to 18 months after surgery  There is a mandatory class that you have to attend prior to surgery where you will be given instructions by therapist, nurse, home health  Cardiac clearance prior to having surgery/the heart doctor will decide your risks involved having surgery and if you can withstand the operation      Procedure, common risks and benefits,alternatives discussed in details.  All questions answered.  Patient expressed understanding.  Patient instructed to call for any questions that could arise in the future.     Most common  Risks:  Infection/2%  Leg-length discrepancy/most common with total hip replacement  Dislocation/most common with total hip replacement/2% risk of the hip coming out of the socket.  If that happens you would need to have it pulled back under sedation.  If this continues to happen recurrent dislocation you may need repeated surgery  Neuro-vascular injury ( resulting in loss of motor and sensory functions)  you have a slight increase risk of having what we call peroneal nerve palsy which results in a footdrop.  70-80% of foot drops recuperate within 6 months.  Almost all people with total knee replacement are slightly numb on the outside of the knee.    Pain  Blood clot  Fat clot  Loss of motion.  Total knees have the tendency to lose motion if you do not exercise and do therapy.  You have to work through the pain to achieve motion  Fracture of bone  Failure of procedure to achieve its intended purpose.  Total knees are 80% successful in decreasing pain increasing function.  Total hips are 90% successful in decreasing pain and increasing function  Failure of hardware/they last approximately 15 years/prosthesis is made out of metal and plastic they could wear out with time  Non-union or mal-union of bone  Malalignment  Death/you go see cardiologist before surgery  Proceed with the right total hip replacement   We will have what we call dual mobility prosthesis available because you are much longer on that right hip compared to the left side and if we need to make you even you risk dislocating.  You also do have hardware in your lumbar spine so doing a dual mobility total hip will minimize the risk of you popping hip out of the place       The mobility limitation can not be sufficiently resolved by the use of a cane.  Patient's functional mobility deficit can be sufficiently resolved with the use of a rolling walker.  Patient has mobility limitation significantly impairs their ability to participate in 1 or more  activities of daily living.  The use of a rolling walker we will significantly improve the patient's ability to participate in  MRADLS and it is to be used on a regular basis in the home.

## 2025-05-05 NOTE — PROGRESS NOTES
Subjective:     Patient ID: Srinath Mcknight is a 84 y.o. male.    Chief Complaint: Pain of the Right Knee    HPI:  03/31/2025  Severe right hip, right knee pain.  This is been going on for several years now.  Treatment in the past included right knee Kenalog injection 01/02/2024, right knee Orthovisc 03/01/2024 right knee Synvisc-One 04/14/2023 by Dr. CHAPARRO and right hip intra-articular injection 12/16/2024 by Dr. Sarah Benedict in [place].  The hip injection seems to have lasted 1 month he said he was without any pain in the hip and the knee pain was minimize was not bothering him as much.  He ambulates with a cane and he does have a Rollator.  He did finish physical therapy at [facility] on the [facility].  History of left total hip replacement by Dr. garcia with a revision.  Review of the revision could not tell for sure what was done probably revising the femoral component.  He stated the left hip is not bothering him as much there is limitation of range of motion and you feel that the left leg is slightly shorter.  Has numbness and tingling both feet I did review MRI of the lumbar spine that did not show any impingement.  Most likely what he has is peripheral neuropathy.  He has quite a bit of issues with his heart he seeing Dr. Melton also with swelling in his left leg and he is already scheduled for ultrasound today also has other tests to be performed.  Received multiple injections in the lumbar spine by Dr. CHAPARRO 10/11/2024 right L4-5 and L5-S1 GAEL, 04/14/2023 right knee Synvisc   Today he is not having any fever or chills or any shortness of breath or chest pain or difficulty with chewing or swallowing    05/05/2025   Severe right knee and hip pain at 10/10.  I did obtain MRI to make sure that we not missing anything in his right knee.  MRIs negative for internal derangement mild trochlear chondromalacia but no meniscal tears or severe arthritis.  I did tell the patient most of his problem is coming from his right  hip You has severe arthritis on the right hip x-ray.  He did receive by Dr. CHAPARRO on 04/30/2025 right femoral and obturator nerve block that cause severe pain and made things worse for him.  On 12/16/2024 you already had an intra-articular injection also in the right hip without much relief.  Patient had received right knee Orthovisc 03/01/2024 right knee Kenalog 01/02/2024, right knee Synvisc-One 04/14/2023 with minimal relief..  He has left total hip anterior approach on 10/19/2017 by Dr. Torres and he had to go again to revise things on 09/11/2018 he had developed heterotopic bone formation limiting his motion and he has a short lag on the left with leg-length discrepancy secondary to the revision.  He does put something in his shoe he does use a cane to get around his pain is 10/10   I did tell him it is not much I can do besides offer him right total hip replacement I think his pain in his knees referred knee pain.  There is no reason to believe that he will develop heterotopic ossification again.  I did tell him it is outpatient surgery he does have a wife at home that could drive and could help a little bit.  You would like to go to physical therapy place.  He said his insurance told him he qualifies for 20 days.  I did tell him they tell that everybody but unless you have surgery and you not doing well you go home the same day and we will arrange for home physical therapy.  Very rarely the approve skilled facility.  No fever no chills no shortness of breath   He needs to see Dr. Melton  Past Medical History:   Diagnosis Date    Anemia due to unknown mechanism 11/10/2015    Angina pectoris 2014    not since    Arthritis     Back pain     Coronary artery disease     Diabetes mellitus with stage 3 chronic kidney disease 11/18/2020    DM (diabetes mellitus) 25-30 years     am 05/15/2019    DM (diabetes mellitus)     BS 97 am 06/04/2021    Encounter for blood transfusion     Gout 12/05/2017    right foot      History of blood transfusion     Hyperlipemia     Hypertension     CHARLES (obstructive sleep apnea)     Polyneuropathy     Rheumatoid arthritis 03/31/2025    Rheumatoid arthritis (Problem)    Small bowel obstruction 08/14/2024    Small bowel obstruction (Problem)      Spinal cord disease     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    Trouble in sleeping     Type 2 diabetes mellitus with diabetic polyneuropathy, without long-term current use of insulin     Urinary incontinence     Uses hearing aid     bilat     Past Surgical History:   Procedure Laterality Date    BACK SURGERY  2019    BLOCK, NERVE, PERIPHERAL Right 4/30/2025    Procedure: right femoral/ obturator nerve block;  Surgeon: Hossein Brooks MD;  Location: [facility] MGT;  Service: Pain Management;  Laterality: Right;    COLONOSCOPY N/A 6/30/2020    Procedure: COLONOSCOPY;  Surgeon: Joseph Iraheta MD;  Location: [facility];  Service: Endoscopy;  Laterality: N/A;    ESOPHAGOGASTRODUODENOSCOPY N/A 6/30/2020    Procedure: EGD (ESOPHAGOGASTRODUODENOSCOPY);  Surgeon: Joseph Iraheta MD;  Location: [facility];  Service: Endoscopy;  Laterality: N/A;    HERNIA REPAIR Bilateral 04/18/2017    INJECTION OF JOINT Right 4/14/2023    Procedure: right Synvisc Knee injection;  Surgeon: Hossein Brooks MD;  Location: [facility] PA[facility] MGT;  Service: Pain Management;  Laterality: Right;    INJECTION OF JOINT Right 12/16/2024    Procedure: RT Intra-articular hip injection;  Surgeon: Merissa Kelly DO;  [facility];  Service: Pain Management;  Laterality: Right;  oral sed-no ac    JOINT REPLACEMENT Left 10/09/2017    L YAHIR Dr. Alcocer    LAMINECTOMY  07/22/2016    left elbow  10/2017    procedure to remove gout    lumbar foraminotomy  03/2018    REVISION TOTAL HIP ARTHROPLASTY Left 9/11/2018    Procedure: REVISION, TOTAL ARTHROPLASTY, HIP;  Surgeon: Albaro Alcocer Sr., MD;  Location: [facility];  Service: Orthopedics;  Laterality: Left;     ROTATOR CUFF REPAIR Left 2006    SHOULDER ARTHROSCOPY W/ ROTATOR CUFF REPAIR Right 2009    TRANSFORAMINAL EPIDURAL INJECTION OF STEROID Right 10/11/2024    Procedure: Right L4/5 + L5/S1 TF GAEL;  Surgeon: Hossein Brooks MD;  Location: [facility];  Service: Pain Management;  Laterality: Right;     Family History   Problem Relation Name Age of Onset    Hypertension Mother      Heart disease Mother      Diabetes Mother      Hypertension Father      Heart disease Father      Diabetes Father      Stroke Father      Diabetes Sister      Cancer Brother  50        pancreas    Drug abuse Brother      Prostate cancer Neg Hx      Macular degeneration Neg Hx      Retinal detachment Neg Hx      Strabismus Neg Hx      Glaucoma Neg Hx      Blindness Neg Hx      Amblyopia Neg Hx      Kidney disease Neg Hx      Mental illness Neg Hx      Intellectual disability Neg Hx      COPD Neg Hx      Asthma Neg Hx       Social History[1]  Medication List with Changes/Refills   Current Medications    ALFUZOSIN (UROXATRAL) 10 MG TB24    Take 1 tablet (10 mg total) by mouth daily with breakfast.    ALLOPURINOL (ZYLOPRIM) 100 MG TABLET    TAKE 1 TABLET EVERY DAY    ASPIRIN 81 MG CHEW    Take 81 mg by mouth once daily. Per Dr. Melton (Cardiology)    BLOOD GLUCOSE CONTROL, LOW SOLN    by Misc.(Non-Drug; Combo Route) route.    BLOOD-GLUCOSE METER (TRUE METRIX AIR GLUCOSE METER) KIT    USE AS DIRECTED    CLOTRIMAZOLE-BETAMETHASONE 1-0.05% (LOTRISONE) CREAM    Apply topically 2 (two) times daily.    CYANOCOBALAMIN, VITAMIN B-12, 1,000 MCG SUBL    Place 1,000 mcg under the tongue once daily.    DICLOFENAC SODIUM (VOLTAREN) 1 % GEL    Apply 2 g topically 4 (four) times daily as needed (pain).    FLUTICASONE PROPIONATE (FLONASE) 50 MCG/ACTUATION NASAL SPRAY    1 spray (50 mcg total) by Each Nostril route once daily.    GARLIC EXTRACT ORAL    Take 1 tablet by mouth once daily. Per Negin LIAO    LACTULOSE (CHRONULAC) 10 GRAM/15 ML SOLUTION     Take 30 mLs (20 g total) by mouth 2 (two) times daily.    LIDOCAINE (LIDODERM) 5 %    Place 1 patch onto the skin daily as needed (pain). Remove & Discard patch within 12 hours or as directed by MD    LOSARTAN (COZAAR) 50 MG TABLET    TAKE 1 TABLET EVERY DAY    MELATONIN 10 MG TAB    Take 1.5 tablets (15 mg total) by mouth every evening.    METFORMIN (GLUCOPHAGE) 1000 MG TABLET    Take 1 tablet (1,000 mg total) by mouth 2 (two) times daily with meals.    MOMETASONE 0.1% (ELOCON) 0.1 % CREAM    Apply topically once daily.    MULTIVITAMIN (THERAGRAN) PER TABLET    Take 1 tablet by mouth once daily.    PRAMIPEXOLE (MIRAPEX) 0.125 MG TABLET    TAKE 1 TABLET EVERY EVENING    PRAVASTATIN (PRAVACHOL) 40 MG TABLET    TAKE 1 TABLET EVERY DAY    SILDENAFIL (VIAGRA) 100 MG TABLET    Take 1 tablet (100 mg total) by mouth daily as needed for Erectile Dysfunction.    SOLIFENACIN (VESICARE) 10 MG TABLET    Take 1 tablet (10 mg total) by mouth once daily.    TRUE METRIX GLUCOSE TEST STRIP STRP    USE AS DIRECTED TO CHECK SUGARS    TRUEPLUS LANCETS 33 GAUGE MISC    USE AS DIRECTED TO CHECK SUGARS     Review of patient's allergies indicates:   Allergen Reactions    Sulfa (sulfonamide antibiotics)      Can't recall from 1995     Review of Systems   Constitutional: Negative for decreased appetite.   HENT:  Negative for tinnitus.    Eyes:  Negative for double vision.   Cardiovascular:  Negative for chest pain.   Respiratory:  Negative for wheezing.    Hematologic/Lymphatic: Negative for bleeding problem.   Skin:  Negative for dry skin.   Musculoskeletal:  Positive for arthritis, back pain, joint pain, muscle weakness and stiffness. Negative for gout and neck pain.   Gastrointestinal:  Negative for abdominal pain.   Genitourinary:  Negative for bladder incontinence.   Neurological:  Negative for numbness, paresthesias and sensory change.   Psychiatric/Behavioral:  Negative for altered mental status.        Objective:   Body mass index is  25.57 kg/m².  There were no vitals filed for this visit.       General    Constitutional: He is oriented to person, place, and time. He appears well-developed.   HENT:   Head: Atraumatic.   Eyes: EOM are normal.   Pulmonary/Chest: Effort normal.   Neurological: He is alert and oriented to person, place, and time.   Psychiatric: Judgment normal.         Ambulating with a cane slightly hunched over  Pelvis is level in the sitting position  Right hip with around 15° of flexion contracture.  Internal rotation 15° external rotation 25-30 degrees with pain in the groin with pelvis tilting.  Hip flexors and abductors are slightly weak at 4+ over 5.  Abduction of the hip 45°.  There is stiffness and pelvis tilting with exam  Left hip internal rotation around 5° external rotation 35°.  Weakness with hip flexors and abductors at 5 minus/5..  In the sitting position he has around 1-1/2 cm leg-length discrepancy with knee to knee  Right knee no joint pain to palpation.  There is mild anterior joint tenderness.  Full range of motion.  Collaterals and cruciates are stable.  Could not elicit a Sincere's.  Mild crepitus with range of motion with palpation on the patella.  Negative apprehension sign to the patella.  No defect to the patella tendon or quadriceps tendon.  No swelling.  Full range of motion.  Calves are soft nontender   Left leg with pitting edema up to mid tibia and the right ankle up to the ankle joint   Able to move the ankle up and down.    Relevant imaging results reviewed and interpreted by me, discussed with the patient and / or family today   MRI right knee  1.  Negative for internal derangement.  2.  Trochlear chondromalacia partial-thickness erosions upper groove.  3.  Mild anterior knee soft tissue swelling.     Finalized on: 4/13/2025 9:37 PM By:  Luis A Gayle MD  [id number]  X-ray 11/13/2024 of the pelvis and hip showing left hip replacement with femoral component looks like has settled down to the lesser  troch.  There is heterotopic ossification over the gluteus.  As far as the right hip there is multiple cystic changes in the femoral head with loss of the medial joint space and inferior acetabular osteophyte consistent with severe arthritis.  On the pelvis you can see some hardware in the lumbar spine at L3-L4/pedicle screws  X-ray of the knees showing good joint space without narrowing.  Bilateral knees without evidence of loss of joint space.  No fracture seen.  MRI of the lumbar spine had showed some degenerative changes but no impingement on nerve roots.  He had previous fusion L3-L4 with hardware  Assessment:     Encounter Diagnoses   Name Primary?    Chondromalacia patellae, right knee Yes    Arthritis of right hip     Hx of total hip arthroplasty, left     Postoperative heterotopic ossification     Degeneration of intervertebral disc of lumbar region with discogenic back pain and lower extremity pain     Adjacent segment disease of lumbar spine with history of fusion procedure     Chondromalacia patellae of right knee         Plan:   Chondromalacia patellae, right knee    Arthritis of right hip    Hx of total hip arthroplasty, left    Postoperative heterotopic ossification    Degeneration of intervertebral disc of lumbar region with discogenic back pain and lower extremity pain    Adjacent segment disease of lumbar spine with history of fusion procedure    Chondromalacia patellae of right knee         There are no Patient Instructions on file for this visit.        Disclaimer: This note was prepared using a voice recognition system and is likely to have sound alike errors within the text.            [1]   Social History  Socioeconomic History    Marital status:     Number of children: 0    Highest education level: Master's degree (e.g., MA, MS, Olegario, MEd, MSW, CARMEL)   Occupational History    Occupation: retired     Comment: teacher   Tobacco Use    Smoking status: Never     Passive exposure: Never     Smokeless tobacco: Never   Substance and Sexual Activity    Alcohol use: Not Currently    Drug use: No    Sexual activity: Not Currently     Partners: Female   Social History Narrative    . No children. Retired but some part time work - 4 hours a day. 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 Caffeine intake - sugar free cola, Rare coffee intake. Still drives. Does have a Living Will . Has a nephew Jt Gayle, lives in [place]. Has a friend Karina Rossi, locally who could help him.      Social Drivers of Health     Financial Resource Strain: Medium Risk (1/23/2025)    Overall Financial Resource Strain (CARDIA)     Difficulty of Paying Living Expenses: Somewhat hard   Food Insecurity: No Food Insecurity (1/23/2025)    Hunger Vital Sign     Worried About Running Out of Food in the Last Year: Never true     Ran Out of Food in the Last Year: Never true   Transportation Needs: No Transportation Needs (8/15/2023)    PRAPARE - Transportation     Lack of Transportation (Medical): No     Lack of Transportation (Non-Medical): No   Physical Activity: Inactive (1/23/2025)    Exercise Vital Sign     Days of Exercise per Week: 0 days     Minutes of Exercise per Session: 10 min   Stress: Stress Concern Present (1/23/2025)    [place] [employer] - Occupational Stress Questionnaire     Feeling of Stress : Very much   Housing Stability: Low Risk  (8/15/2023)    Housing Stability Vital Sign     Unable to Pay for Housing in the Last Year: No     Number of Places Lived in the Last Year: 1     Unstable Housing in the Last Year: No

## 2025-05-06 DIAGNOSIS — E11.59 HYPERTENSION ASSOCIATED WITH DIABETES: ICD-10-CM

## 2025-05-06 DIAGNOSIS — I15.2 HYPERTENSION ASSOCIATED WITH DIABETES: ICD-10-CM

## 2025-05-06 RX ORDER — LOSARTAN POTASSIUM 50 MG/1
50 TABLET ORAL DAILY
Qty: 90 TABLET | Refills: 3 | Status: SHIPPED | OUTPATIENT
Start: 2025-05-06

## 2025-05-06 NOTE — TELEPHONE ENCOUNTER
No care due was identified.  [facility] Embedded Care Due Messages. Reference number: [id number].   5/06/2025 7:43:13 AM CDT

## 2025-05-07 ENCOUNTER — TELEPHONE (OUTPATIENT)
Dept: CARDIOLOGY | Facility: CLINIC | Age: 85
End: 2025-05-07
Payer: MEDICARE

## 2025-05-07 ENCOUNTER — TELEPHONE (OUTPATIENT)
Dept: PAIN MEDICINE | Facility: CLINIC | Age: 85
End: 2025-05-07
Payer: MEDICARE

## 2025-05-07 DIAGNOSIS — I70.0 AORTIC CALCIFICATION: Primary | ICD-10-CM

## 2025-05-07 DIAGNOSIS — Z01.818 PRE-OP EVALUATION: ICD-10-CM

## 2025-05-07 NOTE — TELEPHONE ENCOUNTER
----- Message from Mendy sent at 5/7/2025  3:23 PM CDT -----  Contact: pt  Type:  Needs Medical AdviceWho Called:  ptSymptoms (please be specific):  pt is calling back to tell dr that the shot did not work, his pain is worst How long has patient had these symptoms:   Pharmacy name and phone #:   Would the patient rather a call back or a response via [facility]?  phoneBest Call Back Number:  [phone] Information:

## 2025-05-07 NOTE — TELEPHONE ENCOUNTER
Unfortunately  it is common to have increase pain after receiving an injection. This can be due to the numbing medication wearing off. It may take up to 2-3 weeks for the injection to take full affect.   For your pain, continue taking your medication as prescribed, or take over the counter pain medication, if possible. Ice the pain site 20min on 20 min off and rest, if possible.  Generally  we do not prescribe any stronger or new medication as we will not know if it the the procedure working or the medication. Give it some time to subside and check back in with us if pain worsens.

## 2025-05-07 NOTE — TELEPHONE ENCOUNTER
Scheduled 5/26 for PRV        ----- Message from Paul Melton MD sent at 5/7/2025 12:32 PM CDT -----  Regarding: RE: surgical clearance  Please schedule follow up visit to review all cardiac testing and symptoms and will discuss preop eval. Thanks  ----- Message -----  From: Mindy Carmichael LPN  Sent: 5/7/2025   9:29 AM CDT  To: Paul Melton MD  Subject: FW: surgical clearance                           Last office visit 3/17/25  ----- Message -----  From: Renea Dove LPN  Sent: 5/7/2025   8:12 AM CDT  To: [facility] Staff  Subject: surgical clearance                               Good morningPatient was recently seen in your office and had cardiac testing done. Dr. Schuler would like to proceed with a Right total hip replacement. Please advise if he is cleared for surgery. Thank you kindly,Renea

## 2025-05-09 ENCOUNTER — TELEPHONE (OUTPATIENT)
Dept: PULMONOLOGY | Facility: CLINIC | Age: 85
End: 2025-05-09
Payer: MEDICARE

## 2025-05-11 NOTE — TELEPHONE ENCOUNTER
No care due was identified.  [facility] Embedded Care Due Messages. Reference number: [id number].   5/11/2025 3:38:14 PM CDT

## 2025-05-12 RX ORDER — METOPROLOL SUCCINATE 50 MG/1
50 TABLET, EXTENDED RELEASE ORAL
Qty: 90 TABLET | Refills: 3 | Status: SHIPPED | OUTPATIENT
Start: 2025-05-12

## 2025-05-12 NOTE — TELEPHONE ENCOUNTER
Refill Routing Note   Medication(s) are not appropriate for processing by [facility] for the following reason(s):        Required vitals abnormal  No active prescription written by provider    [facility] action(s):  Defer               Appointments  past 12m or future 3m with PCP    Date Provider   Last Visit   4/24/2025 Yeison Wilder MD   Next Visit   Visit date not found Yeison Wilder MD   ED visits in past 90 days: 1        Note composed:8:23 AM 05/12/2025

## 2025-05-26 ENCOUNTER — OFFICE VISIT (OUTPATIENT)
Dept: PAIN MEDICINE | Facility: CLINIC | Age: 85
End: 2025-05-26
Payer: MEDICARE

## 2025-05-26 ENCOUNTER — OFFICE VISIT (OUTPATIENT)
Dept: CARDIOLOGY | Facility: CLINIC | Age: 85
End: 2025-05-26
Payer: MEDICARE

## 2025-05-26 ENCOUNTER — HOSPITAL ENCOUNTER (OUTPATIENT)
Dept: CARDIOLOGY | Facility: HOSPITAL | Age: 85
Discharge: HOME | End: 2025-05-26
Attending: INTERNAL MEDICINE
Payer: MEDICARE

## 2025-05-26 VITALS
DIASTOLIC BLOOD PRESSURE: 69 MMHG | HEIGHT: 75 IN | BODY MASS INDEX: 25.61 KG/M2 | RESPIRATION RATE: 17 BRPM | SYSTOLIC BLOOD PRESSURE: 127 MMHG | HEART RATE: 82 BPM | WEIGHT: 205.94 LBS | OXYGEN SATURATION: 99 %

## 2025-05-26 VITALS
HEART RATE: 82 BPM | WEIGHT: 207.25 LBS | HEIGHT: 75 IN | OXYGEN SATURATION: 100 % | SYSTOLIC BLOOD PRESSURE: 126 MMHG | BODY MASS INDEX: 25.77 KG/M2 | DIASTOLIC BLOOD PRESSURE: 70 MMHG

## 2025-05-26 DIAGNOSIS — I50.32 CHRONIC HEART FAILURE WITH PRESERVED EJECTION FRACTION: Primary | ICD-10-CM

## 2025-05-26 DIAGNOSIS — G47.33 OSA (OBSTRUCTIVE SLEEP APNEA): ICD-10-CM

## 2025-05-26 DIAGNOSIS — E11.51 TYPE 2 DIABETES MELLITUS WITH DIABETIC PERIPHERAL ANGIOPATHY WITHOUT GANGRENE, WITHOUT LONG-TERM CURRENT USE OF INSULIN: ICD-10-CM

## 2025-05-26 DIAGNOSIS — Z98.1 HISTORY OF LUMBAR FUSION: ICD-10-CM

## 2025-05-26 DIAGNOSIS — N18.30 STAGE 3 CHRONIC KIDNEY DISEASE, UNSPECIFIED WHETHER STAGE 3A OR 3B CKD: ICD-10-CM

## 2025-05-26 DIAGNOSIS — I70.0 AORTIC CALCIFICATION: ICD-10-CM

## 2025-05-26 DIAGNOSIS — D50.9 IRON DEFICIENCY ANEMIA, UNSPECIFIED IRON DEFICIENCY ANEMIA TYPE: ICD-10-CM

## 2025-05-26 DIAGNOSIS — E11.59 HYPERTENSION ASSOCIATED WITH DIABETES: ICD-10-CM

## 2025-05-26 DIAGNOSIS — Z96.642 HISTORY OF LEFT HIP REPLACEMENT: ICD-10-CM

## 2025-05-26 DIAGNOSIS — E11.22 DIABETES MELLITUS WITH STAGE 3 CHRONIC KIDNEY DISEASE: ICD-10-CM

## 2025-05-26 DIAGNOSIS — I70.0 ATHEROSCLEROSIS OF AORTA: ICD-10-CM

## 2025-05-26 DIAGNOSIS — E78.2 COMBINED HYPERLIPIDEMIA ASSOCIATED WITH TYPE 2 DIABETES MELLITUS: ICD-10-CM

## 2025-05-26 DIAGNOSIS — E11.69 COMBINED HYPERLIPIDEMIA ASSOCIATED WITH TYPE 2 DIABETES MELLITUS: ICD-10-CM

## 2025-05-26 DIAGNOSIS — Z01.818 PRE-OP EVALUATION: ICD-10-CM

## 2025-05-26 DIAGNOSIS — I15.2 HYPERTENSION ASSOCIATED WITH DIABETES: ICD-10-CM

## 2025-05-26 DIAGNOSIS — R06.09 OTHER FORM OF DYSPNEA: ICD-10-CM

## 2025-05-26 DIAGNOSIS — M16.11 PRIMARY OSTEOARTHRITIS OF RIGHT HIP: Primary | ICD-10-CM

## 2025-05-26 DIAGNOSIS — E11.42 TYPE 2 DIABETES MELLITUS WITH DIABETIC POLYNEUROPATHY, WITHOUT LONG-TERM CURRENT USE OF INSULIN: ICD-10-CM

## 2025-05-26 DIAGNOSIS — N18.30 DIABETES MELLITUS WITH STAGE 3 CHRONIC KIDNEY DISEASE: ICD-10-CM

## 2025-05-26 DIAGNOSIS — R06.09 DYSPNEA ON EXERTION: ICD-10-CM

## 2025-05-26 DIAGNOSIS — M47.26 OSTEOARTHRITIS OF SPINE WITH RADICULOPATHY, LUMBAR REGION: ICD-10-CM

## 2025-05-26 DIAGNOSIS — Z98.890 HISTORY OF LUMBAR LAMINECTOMY: ICD-10-CM

## 2025-05-26 PROCEDURE — 1101F PT FALLS ASSESS-DOCD LE1/YR: CPT | Mod: CPTII,S$GLB,, | Performed by: INTERNAL MEDICINE

## 2025-05-26 PROCEDURE — 3074F SYST BP LT 130 MM HG: CPT | Mod: CPTII,S$GLB,, | Performed by: INTERNAL MEDICINE

## 2025-05-26 PROCEDURE — 99999 PR PBB SHADOW E&M-EST. PATIENT-LVL V: CPT | Mod: PBBFAC,,, | Performed by: PHYSICIAN ASSISTANT

## 2025-05-26 PROCEDURE — G2211 COMPLEX E/M VISIT ADD ON: HCPCS | Mod: S$GLB,,, | Performed by: INTERNAL MEDICINE

## 2025-05-26 PROCEDURE — 1160F RVW MEDS BY RX/DR IN RCRD: CPT | Mod: CPTII,S$GLB,, | Performed by: INTERNAL MEDICINE

## 2025-05-26 PROCEDURE — 99214 OFFICE O/P EST MOD 30 MIN: CPT | Mod: S$GLB,,, | Performed by: INTERNAL MEDICINE

## 2025-05-26 PROCEDURE — 3078F DIAST BP <80 MM HG: CPT | Mod: CPTII,S$GLB,, | Performed by: INTERNAL MEDICINE

## 2025-05-26 PROCEDURE — 3288F FALL RISK ASSESSMENT DOCD: CPT | Mod: CPTII,S$GLB,, | Performed by: INTERNAL MEDICINE

## 2025-05-26 PROCEDURE — 99999 PR PBB SHADOW E&M-EST. PATIENT-LVL IV: CPT | Mod: PBBFAC,,, | Performed by: INTERNAL MEDICINE

## 2025-05-26 PROCEDURE — 1159F MED LIST DOCD IN RCRD: CPT | Mod: CPTII,S$GLB,, | Performed by: INTERNAL MEDICINE

## 2025-05-26 RX ORDER — FUROSEMIDE 20 MG/1
20 TABLET ORAL DAILY
Qty: 90 TABLET | Refills: 1 | Status: SHIPPED | OUTPATIENT
Start: 2025-05-26 | End: 2025-05-27 | Stop reason: SDUPTHER

## 2025-05-26 NOTE — PROGRESS NOTES
"Subjective:   Patient ID:  Srinath Mcknight is a 84 y.o. male who presents for cardiac consult of No chief complaint on file.      Referral by: No referring provider defined for this encounter.     Reason for consult:       HPI  The patient came in today for cardiac consult of No chief complaint on file.      Srinath Mcknight is a 84 y.o. male pt with HFpEF, hypertension, diabetes, DJD, HLD, CAD, CKD here for CV follow up.         3/17/25  PT of Dr. Calvillo seen last April 2024 here for follow up.   ECHO and Nuc stress neg 10/2021.     ER eval 3/9/25  Chief Complaint   Patient presents with    Shortness of Breath       SOB, uti, hospitalized last Saturday and discharged Monday. Electrolytes were low. Somewhat dizziness. "Couldn't walk too far"      The patient is an 84-year-old male.  He has a documented past medical history significant for hypertension, hyperlipidemia, diabetes, CKD, CAD, anemia, sleep apnea, neuropathy, chronic back pain, arthritis, gout, hearing aid dependence, ambulates with cane/walker.  He presents to the emergency room for an urgent evaluation with multiple complaints.  His chief complaint is dizziness/lightheadedness and unsteady gait.  He states for the past few weeks he has had increased lightheadedness, especially when going from a sitting to standing position.  He states that he does experience a spinning sensation and has to hold onto something to steady himself and prevent falling.  He states that he has had several falls this month because of this.  His wife contributes that he seems to have developed a change in his speech over the past 3-4 weeks.  He denies any headaches, or vision changes.  He reports chronic low back pain, but states that this is not new or worse, and denies any acute focal numbness or weakness. He denies any acute changes in bowel or bladder habits, but does report chronic constipation, manages with lactulose, last BM yesterday, not diarrhea, black or bloody. " "They live between [place] and [place] and report that he was seen at [facility] ER for these complaints last weekend and was admitted.  She states that during this admission they were told that his calcium and magnesium levels were low and that he had a urinary tract infection. She states that he was given supplements and also prescribed an antibiotic that he completed course yesterday, but does not recall the name of the medication.  He states that he is not experiencing any dysuria or difficulty urinating. He denies any fevers chills or sweats.  He does state that he does become short of breath with activity, but this is also long standing, not new or worse. He denies having any coughing or wheezing. He denies having any chest pain. They report that he was seen at an emergency room in [place] last night for all of this, and was told that his blood pressure was low, but was ultimately discharged. Pt and wife stating they feel that they were not given any further explanation or treatment plan for symptoms and are "worried that he may have had a stroke", prompting ED visit here for second opinion. He did not take his regular medications this morning.      Serial blood pressure reading progressively increasing throughout ED stay. 110/50 on arrival --> 200/91. Pt did not take BP meds today. He is prescribed Losartan 50 and metoprolol 50. Will give Losartan in ED.   Lengthy discussion held after work up regarding results. Expressed relief regarding neg CT/MRI. Dizziness/lightheadedness seems positional, but sounds like may be due to low BP more so than vertigo. Episodes seem to happen in morning after BP meds, may need to reduce meds. Shared decision making regarding disposition, considered/offered admission, however, currently patient is not dizzy with standing and BP is notable elevated after not taking meds today; states that he has an appointment scheduled with his PCP on Tuesday and prefers " discharge. Advised to monitor BP closely at home and record daily log to take and review with PCP. Advised fall precautions and gave detailed instructions regarding standing up to quickly from seated position, wife agrees to supervise assist him to prevent falls. If BP readings are low after AM meds, will call PCP and consider holding BBlocker.   Patient now is also asking about back pain medication. States that he was prescribed gabapentin, but stopped taking it because feels it given him RLS and interrupts his sleep, has only been taking Tylenol. Will try Lidoderm patches and also discuss with PCP.   Return precautions advised. Verbalized understanding and comfort with plan.       Pt presents here post ER eval for hypotension/orthostasis     BP and HR stable today. BMI 26 - 209 lbs   His BP has been stable at home.   He has more back pain and knee   He has more DAMON.     BNP (pg/mL)   Date Value   03/09/2025 127 (H)      5/26/25    BP and HR stable today.   Nuc stress 4/2025 with moderate intensity, medium sized, fixed perfusion abnormality consistent with scar in the basal to distal inferior wall(s).  ECHO 4/2025 with normal bi V function, grade 1 DD, mild AI, mild TR, PASP 22mmHg.   He has right hip pain - will need hip replacement.     He has more DAMON lately, cannot walk much now.   His BP improved not on BP meds yet.  Discussed need to have more fluid off and be stable prior to surgery.   Will start lasix 20mg    Results for orders placed during the hospital encounter of 04/28/25    Echo    Interpretation Summary    Left Ventricle: The left ventricle is normal in size. Moderately increased wall thickness. There is moderate concentric hypertrophy. There is normal systolic function with a visually estimated ejection fraction of 55 - 60%. Grade I diastolic dysfunction.    Right Ventricle: The right ventricle is normal in size. Wall thickness is normal. Systolic function is normal.    Aortic Valve: There is mild  aortic regurgitation.    Tricuspid Valve: There is mild regurgitation.    Pulmonary Artery: The estimated pulmonary artery systolic pressure is 22 mmHg.    IVC/SVC: Normal venous pressure at 3 mmHg.      Results for orders placed during the hospital encounter of 04/28/25    Nuclear Stress - Cardiology Interpreted    Interpretation Summary    Abnormal myocardial perfusion scan.    There is a moderate intensity, medium sized, fixed perfusion abnormality consistent with scar in the basal to distal inferior wall(s).    There are no other significant perfusion abnormalities.    The gated perfusion images showed an ejection fraction of 75% at rest. The gated perfusion images showed an ejection fraction of 52% post stress. Normal ejection fraction is greater than 59%.    The ECG portion of the study is negative for ischemia.    There were no arrhythmias during stress.      Results for orders placed during the hospital encounter of 10/12/21    Echo    Interpretation Summary  · The left ventricle is normal in size with concentric remodeling and normal systolic function.  · Normal left ventricular diastolic function.  · Normal right ventricular size with normal right ventricular systolic function.  · The estimated ejection fraction is 55%.  · Mild aortic regurgitation.      Results for orders placed during the hospital encounter of 10/12/21    Nuclear Stress - Cardiology Interpreted    Interpretation Summary    Normal myocardial perfusion scan. There is no evidence of myocardial ischemia or infarction.    The gated perfusion images showed an ejection fraction of 47% at rest. The gated perfusion images showed an ejection fraction of 63% post stress.    The EKG portion of this study is uninterpretable due to significant baseline abnormalities    The patient reported no chest pain during the stress test.      No results found for this or any previous visit.      No cardiac monitor results found for the past 12  months         Past Medical History:   Diagnosis Date    Anemia due to unknown mechanism 11/10/2015    Angina pectoris 2014    not since    Arthritis     Back pain     Coronary artery disease     Diabetes mellitus with stage 3 chronic kidney disease 11/18/2020    DM (diabetes mellitus) 25-30 years     am 05/15/2019    DM (diabetes mellitus)     BS 97 am 06/04/2021    Encounter for blood transfusion     Gout 12/05/2017    right foot     History of blood transfusion     Hyperlipemia     Hypertension     CHARLES (obstructive sleep apnea)     Polyneuropathy     Rheumatoid arthritis 03/31/2025    Rheumatoid arthritis (Problem)    Small bowel obstruction 08/14/2024    Small bowel obstruction (Problem)      Spinal cord disease     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    Trouble in sleeping     Type 2 diabetes mellitus with diabetic polyneuropathy, without long-term current use of insulin     Urinary incontinence     Uses hearing aid     bilat       Past Surgical History:   Procedure Laterality Date    BACK SURGERY  2019    BLOCK, NERVE, PERIPHERAL Right 4/30/2025    Procedure: right femoral/ obturator nerve block;  Surgeon: Hossein Brooks MD;  Location: [facility];  Service: Pain Management;  Laterality: Right;    COLONOSCOPY N/A 6/30/2020    Procedure: COLONOSCOPY;  Surgeon: Joseph Iraheta MD;  Location: [facility];  Service: Endoscopy;  Laterality: N/A;    ESOPHAGOGASTRODUODENOSCOPY N/A 6/30/2020    Procedure: EGD (ESOPHAGOGASTRODUODENOSCOPY);  Surgeon: Joseph Iraheta MD;  Location: [facility] ENDO;  Service: Endoscopy;  Laterality: N/A;    HERNIA REPAIR Bilateral 04/18/2017    INJECTION OF JOINT Right 4/14/2023    Procedure: right Synvisc Knee injection;  Surgeon: Hossein Brooks MD;  Location: [facility] MGT;  Service: Pain Management;  Laterality: Right;    INJECTION OF JOINT Right 12/16/2024    Procedure: RT Intra-articular hip injection;  Surgeon:  Merissa Kelly DO;  Location: [facility] PAIN MANAGEMENT;  Service: Pain Management;  Laterality: Right;  oral sed-no ac    JOINT REPLACEMENT Left 10/09/2017    L YAHIR Dr. Alcocer    LAMINECTOMY  07/22/2016    left elbow  10/2017    procedure to remove gout    lumbar foraminotomy  03/2018    REVISION TOTAL HIP ARTHROPLASTY Left 9/11/2018    Procedure: REVISION, TOTAL ARTHROPLASTY, HIP;  Surgeon: Albaro Alcocer Sr., MD;  Location: [facility] OR;  Service: Orthopedics;  Laterality: Left;    ROTATOR CUFF REPAIR Left 2006    SHOULDER ARTHROSCOPY W/ ROTATOR CUFF REPAIR Right 2009    TRANSFORAMINAL EPIDURAL INJECTION OF STEROID Right 10/11/2024    Procedure: Right L4/5 + L5/S1 TF GAEL;  Surgeon: Hossein Brooks MD;  Location: [facility] PAIN [facility]GT;  Service: Pain Management;  Laterality: Right;       Social History[1]    Family History   Problem Relation Name Age of Onset    Hypertension Mother      Heart disease Mother      Diabetes Mother      Hypertension Father      Heart disease Father      Diabetes Father      Stroke Father      Diabetes Sister      Cancer Brother  50        pancreas    Drug abuse Brother      Prostate cancer Neg Hx      Macular degeneration Neg Hx      Retinal detachment Neg Hx      Strabismus Neg Hx      Glaucoma Neg Hx      Blindness Neg Hx      Amblyopia Neg Hx      Kidney disease Neg Hx      Mental illness Neg Hx      Intellectual disability Neg Hx      COPD Neg Hx      Asthma Neg Hx         Patient's Medications   New Prescriptions    FUROSEMIDE (LASIX) 20 MG TABLET    Take 1 tablet (20 mg total) by mouth once daily. Do not take if BP is below 100/70 or feeling dry   Previous Medications    ALFUZOSIN (UROXATRAL) 10 MG TB24    Take 1 tablet (10 mg total) by mouth daily with breakfast.    ALLOPURINOL (ZYLOPRIM) 100 MG TABLET    TAKE 1 TABLET EVERY DAY    ASPIRIN 81 MG CHEW    Take 81 mg by mouth once daily. Per Dr. Melton (Cardiology)    BLOOD GLUCOSE CONTROL, LOW SOLN    by  Misc.(Non-Drug; Combo Route) route.    BLOOD-GLUCOSE METER (TRUE METRIX AIR GLUCOSE METER) KIT    USE AS DIRECTED    CLOTRIMAZOLE-BETAMETHASONE 1-0.05% (LOTRISONE) CREAM    Apply topically 2 (two) times daily.    CYANOCOBALAMIN, VITAMIN B-12, 1,000 MCG SUBL    Place 1,000 mcg under the tongue once daily.    DICLOFENAC SODIUM (VOLTAREN) 1 % GEL    Apply 2 g topically 4 (four) times daily as needed (pain).    FLUTICASONE PROPIONATE (FLONASE) 50 MCG/ACTUATION NASAL SPRAY    1 spray (50 mcg total) by Each Nostril route once daily.    GARLIC EXTRACT ORAL    Take 1 tablet by mouth once daily. Per Negin LIAO    LACTULOSE (CHRONULAC) 10 GRAM/15 ML SOLUTION    Take 30 mLs (20 g total) by mouth 2 (two) times daily.    LIDOCAINE (LIDODERM) 5 %    Place 1 patch onto the skin daily as needed (pain). Remove & Discard patch within 12 hours or as directed by MD    MELATONIN 10 MG TAB    Take 1.5 tablets (15 mg total) by mouth every evening.    METFORMIN (GLUCOPHAGE) 1000 MG TABLET    Take 1 tablet (1,000 mg total) by mouth 2 (two) times daily with meals.    MOMETASONE 0.1% (ELOCON) 0.1 % CREAM    Apply topically once daily.    MULTIVITAMIN (THERAGRAN) PER TABLET    Take 1 tablet by mouth once daily.    PRAVASTATIN (PRAVACHOL) 40 MG TABLET    TAKE 1 TABLET EVERY DAY    SOLIFENACIN (VESICARE) 10 MG TABLET    Take 1 tablet (10 mg total) by mouth once daily.    TRUE METRIX GLUCOSE TEST STRIP STRP    USE AS DIRECTED TO CHECK SUGARS    TRUEPLUS LANCETS 33 GAUGE MISC    USE AS DIRECTED TO CHECK SUGARS   Modified Medications    No medications on file   Discontinued Medications    LOSARTAN (COZAAR) 50 MG TABLET    Take 1 tablet (50 mg total) by mouth once daily.    METOPROLOL SUCCINATE (TOPROL-XL) 50 MG 24 HR TABLET    TAKE 1 TABLET EVERY DAY       Review of Systems   Constitutional:  Positive for malaise/fatigue.   HENT: Negative.     Eyes: Negative.    Respiratory:  Positive for shortness of breath.    Cardiovascular: Negative.   "  Gastrointestinal: Negative.    Genitourinary: Negative.    Musculoskeletal:  Positive for back pain and joint pain.   Skin: Negative.    Neurological:  Positive for dizziness and focal weakness.   Endo/Heme/Allergies: Negative.    Psychiatric/Behavioral: Negative.     All 12 systems otherwise negative.      Wt Readings from Last 3 Encounters:   05/26/25 94 kg (207 lb 3.7 oz)   05/26/25 93.4 kg (205 lb 14.6 oz)   05/05/25 92.8 kg (204 lb 9.4 oz)     Temp Readings from Last 3 Encounters:   04/30/25 98.1 °F (36.7 °C) (Temporal)   04/24/25 97 °F (36.1 °C) (Tympanic)   03/28/25 97.9 °F (36.6 °C) (Tympanic)     BP Readings from Last 3 Encounters:   05/26/25 126/70   05/26/25 127/69   04/30/25 (!) 166/79     Pulse Readings from Last 3 Encounters:   05/26/25 82   05/26/25 82   04/30/25 68       /70 (Patient Position: Sitting)   Pulse 82   Ht 6' 3" (1.905 m)   Wt 94 kg (207 lb 3.7 oz)   SpO2 100%   BMI 25.90 kg/m²     Objective:   Physical Exam  Vitals and nursing note reviewed.   Constitutional:       General: He is not in acute distress.     Appearance: He is well-developed. He is not diaphoretic.   HENT:      Head: Normocephalic and atraumatic.      Nose: Nose normal.   Eyes:      General: No scleral icterus.     Conjunctiva/sclera: Conjunctivae normal.   Neck:      Thyroid: No thyromegaly.      Vascular: No JVD.   Cardiovascular:      Rate and Rhythm: Normal rate and regular rhythm.      Heart sounds: S1 normal and S2 normal. Murmur heard.      No friction rub. No gallop. No S3 or S4 sounds.   Pulmonary:      Effort: Pulmonary effort is normal. No respiratory distress.      Breath sounds: Normal breath sounds. No stridor. No wheezing or rales.   Chest:      Chest wall: No tenderness.   Abdominal:      General: Bowel sounds are normal. There is no distension.      Palpations: Abdomen is soft. There is no mass.      Tenderness: There is no abdominal tenderness. There is no rebound.   Genitourinary:     " Comments: Deferred  Musculoskeletal:         General: No tenderness or deformity. Normal range of motion.      Cervical back: Normal range of motion and neck supple.   Lymphadenopathy:      Cervical: No cervical adenopathy.   Skin:     General: Skin is warm and dry.      Coloration: Skin is not pale.      Findings: No erythema or rash.   Neurological:      Mental Status: He is alert and oriented to person, place, and time.      Motor: No abnormal muscle tone.      Coordination: Coordination normal.   Psychiatric:         Behavior: Behavior normal.         Thought Content: Thought content normal.         Judgment: Judgment normal.       Lab Results   Component Value Date     03/09/2025    K 4.3 03/09/2025     (H) 03/09/2025    CO2 21 (L) 03/09/2025    BUN 22 03/09/2025    CREATININE 1.5 (H) 03/09/2025     03/09/2025    HGBA1C 6.1 (H) 01/24/2025    MG 1.7 03/09/2025    AST 25 03/09/2025    ALT 21 03/09/2025    ALBUMIN 3.5 03/09/2025    PROT 6.8 03/09/2025    BILITOT 0.4 03/09/2025    WBC 4.24 03/09/2025    HGB 11.3 (L) 03/09/2025    HCT 33.8 (L) 03/09/2025    MCV 89 03/09/2025     03/09/2025    INR 1.0 07/31/2019    TSH 1.198 01/24/2025    CHOL 134 01/24/2025    HDL 29 (L) 01/24/2025    LDLCALC 72.8 01/24/2025    TRIG 161 (H) 01/24/2025     (H) 03/09/2025         BNP (pg/mL)   Date Value   03/09/2025 127 (H)     INR (no units)   Date Value   07/31/2019 1.0   02/08/2018 0.9   07/19/2016 0.9   03/10/2015 1.0          Assessment:      1. Chronic heart failure with preserved ejection fraction    2. Atherosclerosis of aorta    3. Aortic calcification    4. Osteoarthritis of spine with radiculopathy, lumbar region    5. Hypertension associated with diabetes    6. Type 2 diabetes mellitus with diabetic polyneuropathy, without long-term current use of insulin    7. Stage 3 chronic kidney disease, unspecified whether stage 3a or 3b CKD    8. Type 2 diabetes mellitus with diabetic peripheral  angiopathy without gangrene, without long-term current use of insulin    9. Combined hyperlipidemia associated with type 2 diabetes mellitus    10. Dyspnea on exertion    11. Diabetes mellitus with stage 3 chronic kidney disease    12. Other form of dyspnea    13. CHARLES (obstructive sleep apnea)    14. Iron deficiency anemia, unspecified iron deficiency anemia type          Plan:       Dyspnea on exertion last BNP - 127 lbs   -Nuc stress 4/2025 with moderate intensity, medium sized, fixed perfusion abnormality consistent with scar in the basal to distal inferior wall(s).  -ECHO 4/2025 with normal bi V function, grade 1 DD, mild AI, mild TR, PASP 22mmHg.   - will see pulm soon  - start Lasix 20mg     Dizziness- intermittent , with hearing loss  - f/u ENT   Had stopped flomax  f/u neuro - had recent Neg MRI   Carotids neg 4/2025     CAD  H/o calcified aorta  Denies angina  Continue aspirin and statin     CKD with anemia  Stable  - repeat iron studies      HLD  LDL 78 as of 10/23  Continue pravastatin 40 mg daily     HTN  Titrate meds  - BP meds held now  - needs more salt if BP is low      Diabetes  Stable, A1c 6.4  Continue therapy      Arthritis  Chronic back and knee pain  Ambulates with a cane  -f.u PCP   - may need hip surgery soon     Visit today included increased complexity associated with the care of the episodic problem dyspnea addressed and managing the longitudinal care of the patient due to the serious and/or complex managed problem(s) .      Thank you for allowing me to participate in this patient's care. Please do not hesitate to contact me with any questions or concerns. Consult note has been forwarded to the referral physician.              [1]  Social History  Tobacco Use    Smoking status: Never     Passive exposure: Never    Smokeless tobacco: Never   Substance Use Topics    Alcohol use: Not Currently    Drug use: No

## 2025-05-26 NOTE — PROGRESS NOTES
Established Patient Chronic Pain Note (Follow-up Visit)    Referring Physician: Yeison Wilder MD    PCP: Yeison Wilder MD      Chief complaint:  Hip Pain (Patient received 0% relief from nerve block.  Patient has pain from right hip down to rt knee sometimes foot.  Pain level 10/10)     low back pain with RLE radicular pain (now bilateral)  Right knee pain   Right hip pain, occasional left hip pain (h/o left replacement)              Interval History (5/26/2025):  Srinath Mcknight presents for follow-up after a right femoral/ obturator nerve block on 4/30/25 performed about a month ago for hip arthritis. He reports severe pain in the hip area that radiates down through the thigh and into the knee. The procedure provided no relief. Pain has caused significant sleep disturbance, with him not sleeping at all the previous night. He has difficulty walking from the office to his car without stopping due to shortness of breath.  Patient reports pain as 10/10 today.     He has tried topical gels for pain management, which he continues to use. He expresses frustration with injections, stating they are not effective and only increasing his medical expenses. His orthopedic provider, Dr. Schuler, has recommended a hip replacement as the only remaining treatment option. He has to see Dr. Melton (Cardiology) today for clearance. He will see pulmonary tomorrow to further discuss SOB.     His medical history includes a left hip replacement in 2017 performed by Dr. Alcocer, who is no longer practicing, and three back surgeries in the past.    Interval History (4/9/2025):  Srinath Mcknight presents today for follow-up visit.  Patient was last seen on 1/8/2025. At that visit, the plan was to continue Lyrica, which he is no longer taking -- due to SE of drowsiness, hallucinations. Patient reports pain as 10/10 today.  Since last visit, he has seen Dr. Schuler (HPI below). He is scheduled for upcoming knee MRI and will  follow-up with him after.     HPI:  03/31/2025  Severe right hip, right knee pain.  This is been going on for several years now.  Treatment in the past included right knee Kenalog injection 01/02/2024, right knee Orthovisc 03/01/2024 right knee Synvisc-One 04/14/2023 by Dr. CHAPARRO and right hip intra-articular injection 12/16/2024 by Dr. Sarah Benedict in [place].  The hip injection seems to have lasted 1 month he said he was without any pain in the hip and the knee pain was minimize was not bothering him as much.  He ambulates with a cane and he does have a Rollator.  He did finish physical therapy at [facility] [place].  History of left total hip replacement by Dr. garcia with a revision.  Review of the revision could not tell for sure what was done probably revising the femoral component.  He stated the left hip is not bothering him as much there is limitation of range of motion and you feel that the left leg is slightly shorter.  Has numbness and tingling both feet I did review MRI of the lumbar spine that did not show any impingement.  Most likely what he has is peripheral neuropathy.  He has quite a bit of issues with his heart he seeing Dr. Melton also with swelling in his left leg and he is already scheduled for ultrasound today also has other tests to be performed.  Received multiple injections in the lumbar spine by Dr. CHAPARRO 10/11/2024 right L4-5 and L5-S1 GAEL, 04/14/2023 right knee Synvisc   Today he is not having any fever or chills or any shortness of breath or chest pain or difficulty with chewing or swallowing    Interval History (1/8/2025):  Patient presents today for follow-up visit.  Patient was last seen on 11/6/2024 by our clinic but has seen seen Orthopedics in [place], then referred to pain mgmt there.  At the last visit with me, patient was started on Lyrica.  Patient reports pain as 5/10 today.  S/p Right Intra-articular hip injection on 12/16/2024 with Dr. Merissa Kelly with some  "pain relief, maybe 75-80% pain relief.  Today, he complains of right knee and low back pain. He reports no pain relief with previous lumbar injection or knee injection (visco and steroid).   At last visit, he was started on Lyrica. He was unsure what medication is causing light headedness and risk for falls. He reports he stopped taking Klonopin because of this, which he feels was the cause of the dizziness/ confusion. He reports he "jumps up at night seeing things" some nights. He reports he has been off of the Klonopin for about 3 weeks.     11/15/2024 - Dr. Kelly (Pain Mgmt): Srinath Mcknight is a 83 y.o. male who presents complaining of right hip pain that radiates into the right thigh and right knee. Patient was referred by Dr. Wall (Orthopedics).   He recently underwent right L4-L5 and L5-S1 transforaminal epidural steroid injection with little to no relief. He was recently evaluated by Orthopedics and found to have severe osteoarthritis in the right hip.  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  Orthopedics recommending right intra-articular hip injection.  He does have a history of total hip replacement on the left. Patient was previously following at the [facility] pain management clinic.   11/13/24 - Dr. Wall (Ortho): Patient presents with multiple pain complaints affecting his lower back, both hips, both thighs, and right knee, with symptoms persisting for about 3-4 years. His right knee is worse than the left. He expresses frustration with previous medical care, stating that he has been going in circles when describing his experience with different specialists. He uses two canes for walking due to balance issues, explaining that he tends to fall in the direction he's leaning without the support. He denies feeling dizzy or lightheaded when unsteady.  He has a history of hip replacement surgery, which required two procedures on the same hip. He has also undergone back surgeries, with two performed at the " spine clinic in [place] and the first one in 2017.  Recently, he has been experiencing slurred speech for the last two weeks or more. His wife notes that he sleeps excessively.  He reports pain and burning sensations in his feet, especially at night. He describes his knee pain as feeling like sharp, pin sensations at night, stating that he has to hold it as it won't straighten out. He also experiences pain in the groin area when walking.  He has previously received injections for pain management but reports dissatisfaction with his current pain management care in [place], stating that pain management has indicated they have exhausted their treatment options.  Regarding his back pain, he experiences discomfort when getting up and walking. He has tried using a brace for support but found it ineffective, stating that it does not provide adequate support for his upper abdominal area.  He denies feeling dizzy or lightheaded when unsteady. He denies having Parkinson's disease.    Interval History (11/6/2024): Srinath Mcknight presents today for follow-up visit.  he underwent right L4/5 + L5/S1 TF GAEL on 10/11/24.  The patient reports that he is/was slightly better following the procedure.    Patient reports pain as 7/10 today.  He is having hip and knee pain as well.  He stopped taking Lyrica when he ran out of the prescription.  He does not want to do physical therapy because he can do it on his own.  He has not seen Neurosurgery in over 7 months per reporting.    Interval History (9/25/2024):  Patient presents today for follow-up visit to review lumbar MRI results.   Patient reports pain as 5/10 today.  Continues to have right leg pain along the L4 dermatome.  He feels that he has been told that the pain is generating from different sources.  He was told in the past that he would require a right hip replacement.  He has a history of left hip replacement and has no pain on that side.  He also has right knee pain,  "which did not improve with injections.  He also reports that he was told in the past that 1 leg is shorter than the other, which she uses a leg lift insert for; he does feel this helps some.  He finished physical therapy 2 months ago.    Interval history 08/01/2024  Srinath Mcknight is a 83 y.o. male with past medical history significant for restrictive lung disease, hyperlipidemia, hypertension, type 2 diabetes complicated by peripheral angiopathy and nonproliferative retinopathy, stage 3 chronic kidney disease who presents to the clinic for the evaluation of lower back and leg pain. Today he reports pain is constant and is rated a 10/10.  Of note patient has history of prior L3-4 lumbar fusion and L4-5 lumbar laminectomy with Dr. Iqbal.  Today reports pain in the lower back which can radiate down the anterior aspects of bilateral lower extremities in L3-5 distribution to the knee.  Pain feels like a ball" in the thigh.  Pain is worse on the right than on the left.  Pain is exacerbated with positional changes moving from sitting to standing and with standing and with ambulation.  Patient is able to ambulate with assistive device, cane only a few minutes before requiring rest.  He does endorse associated weakness in the lower extremities associated with his pain.  Pain is improved with sitting.  Of note patient has continued Lyrica 75 mg twice daily but he is unsure of its degree of benefit.  He reports cumbersome polypharmacy and is unsure which medications are effective. Patient has continued conventional land-based physical therapy from 05/03/2024, 07/03/2024, twice weekly sessions--13 sessions total.  He reports physical therapy is beneficial only while he is actively in the session.  Patient has plans to join a gym for exercise.  He also reports longstanding right knee pain.  Patient reports receiving corticosteroid injection and series of 3 viscosupplementation with Dr. Winters (1-3/2024) without relief " exceeding 1 day.  Patient has not been evaluated for surgical intervention but secondary to history of prior numerous surgeries, he would like to avoid surgical intervention if possible.    Patient reports significant motor weakness and loss of sensations.  Patient denies night fever/night sweats, urinary incontinence, bowel incontinence, and significant weight loss.        Pain Disability Index (PDI) Score Review:      4/9/2025    10:18 AM 1/8/2025     1:07 PM 11/15/2024     2:48 PM   Last 3 PDI Scores   Pain Disability Index (PDI) 60 35 47       Non-Pharmacologic Treatments:  Physical Therapy/Home Exercise: yes  Ice/Heat:yes  TENS: no  Acupuncture: no  Massage: yes  Chiropractic: no    Relevant sx:  -Dr. Alcocer: L YAHIR 08/15/2017, L hip arthroplasty resection 09/04/2018  -Dr. Iqbal: posterior lumbar fusion      Pain Medications:  - Adjuvant Medications: Lyrica ( Pregabalin) and Topical Ointment (Voltaren Gel, Steroid cream, Anti-Inflammatory Cream, Compound cream)        Pain Procedures:     Dr. Merissa Kelly:  -12/16/2024: Right Intra-articular hip injection with 75-80% pain relief    Dr. Brooks:  -04/14/2023:  right knee IA Synvisc injection  -10/11/2024: right L4/5 + L5/S1 TF GAEL   -4/30/2025: right femoral/ obturator nerve block     Dr. Roldan:  -02/15/2018:  Left-sided SI joint injection  -01/24/2018: Left-sided L4-5 and L5-S1 transforaminal epidural steroid injection    Dr. Hutchinson:  -06/14/2016: Left-sided L3-4 facet injection and cyst aspiration    Dr. Madera:  -05/18/2016:  Left hip intra-articular joint injection    Dr. Douglas:  -09/06/2023: Right radiocarpal injection    Dr. Winetrs:  -02/16/2024, 02/23/2024, 03/01/2024: Right knee intra-articular joint injection with Orthovisc  -01/02/2024: Right knee intra-articular joint injection with triamcinolone  -11/09/2022: Right knee intra-articular joint injection with triamcinolone  -11/10/2021: Right knee intra-articular joint injection with  triamcinolone      Interval History (8/16/2023):   Patient presents today for follow-up visit.  Patient was last seen on 7/17/2023. At that visit, the plan was to  Schedule right genicular nerve block, but it was not approved with insurance. Patient reports pain as 7/10 today.  He continues to have chronic knee pain.  He has failed knee steroid injections with Dr. Winters.  Also reports that he has had viscosupplementation in the past with limited pain relief.  He does not believe that he is a surgical candidate.    Interval History (7/17/2023):  Srinath Mcknight presents today for follow-up visit.  Patient was last seen on 5/11/2023. Pain in right knee has returned; it has only been about 3 months since viscosupplementation. Patient reports pain as 7/10 today.  He has been going to the gym.  He also reports right hand pain.  He saw Dr. Douglas in the past and had an injection, and the pain has returned.    Interval History (5/11/2023): Srinath Mcknight presents today for follow-up visit.  he underwent right knee Synvisc-One injection on 4/14/23 (1 month ago).  The patient reports that he is/was better following the procedure.  he reports 50% pain relief.     The changes have continued through this visit.  Patient reports pain as 8/10 today.  His main complaint today is right groin pain.  He has never had any prior treatment of his right hip in the past.    Interval History (3/23/2023):  Srinath Mcknight presents today for follow-up visit.  Patient was last seen on 2/16/2023. At that visit, the plan was to start Lyrica, only talking QHS, which seems to be helping. Patient reports pain as 6/10 today.  His main complaint today is right knee pain.  He had a right knee steroid injection with Dr. Winters on 11/09/2022 with about 3-4 months pain relief.  This was the 1st treatment he has had for his right knee in the past.  Does not have any left knee pain.  Continues to do at home physical therapy exercises, which she  feels is helping his back pain.  Overall, stable; pain is better controlled than it was at the last visit.    Initial HPI (2/16/2023):  Srinath Mcknight is a 82 y.o. male with past medical history significant for hypertension, hyperlipidemia, type 2 diabetes, coronary artery disease, urinary incontinence, sickle cell trait, stage 3 chronic kidney disease, gout, obstructive sleep apnea, history of L3-4 lumbar fusion and L4-5 lumbar laminectomy who presents to the clinic for the evaluation of lower back pain.  Of note patient reports 3 prior back surgeries:  The initial several years prior in the left lower back at [facility], and 2 lower back surgeries with Dr. Iqbal within the last 2 years.  Today patient reports pain which is constant which is rated an 8/5.  Pain is described as aching in nature.  Patient reports pain in a bandlike distribution in the lower back without radiation into the buttocks or lower extremities.  Patient does report chronic right-sided knee pain and history of total left hip arthroplasty.  Pain is exacerbated 1st thing in the morning when arising, with lumbar extension as well as with ambulation.  Patient reports he is able to ambulate only a few steps before requiring rest.  Pain is improved with lumbar support as well as sitting.  Patient also reports receiving a lumbar epidural steroid injection at the back and spine [place] without any meaningful relief.  Patient has completed conventional physical therapy last year and continues physician directed physical therapy exercises at home without meaningful relief.  Patient has been taking gabapentin 600 mg once daily without improvement in his pain.  Patient does endorse weakness in the lower extremities associated with his pain.  Patient reports significant motor weakness.  Patient denies night fever/night sweats, urinary incontinence, bowel incontinence, significant weight loss, and loss of sensations.      Pain Disability  Index Review:      4/9/2025    10:18 AM 1/8/2025     1:07 PM 11/15/2024     2:48 PM   Last 3 PDI Scores   Pain Disability Index (PDI) 60 35 47       Non-Pharmacologic Treatments:  Physical Therapy/Home Exercise: yes  Ice/Heat:no  TENS: no  Acupuncture: no  Massage: no  Chiropractic: no    Other: yes; sx x 3: [facility], Dr. Iqbal x 2      Pain Medications:  - Opioids: Percocet (Oxycodone/Acetaminophen)  - Adjuvant Medications: Clonazepam (Klonopin), Neurontin (Gabapentin), and Prednisone (Deltasone)    Relevant sx:  - 3 prior back surgeries:  1st [facility], and 2 lower back surgeries with Dr. Iqbal (March 2018: L3-L4 L5 decompression/laminotomy/micro dissection; August 2019: L3-4 revision with cage insertion and laminectomy/diskectomy)  - total left hip arthroplasty (total of 2 surgeries)      Pain Procedures:     Other providers:  -cervical medial branch facet injections in 2020 with Dr. Linares with subsequent RFA  -02/15/2018: Left-sided sacroiliac joint injection; Dr. Roldan  -01/24/2018: Left-sided L4/5 and L5/S1 transforaminal epidural steroid injection; Dr. Roldan  -06/14/2016: Left L3-4 facet joint injection; Dr. Hutchinson  -05/18/2016: Left hip intra-articular injection posterior approach; Dr. Madera    Orthopedics:  -right knee steroid injection with Dr. Winters on 11/09/2022 with about 3-4 months pain relief  -right wrist injection in May 2021 with Dr. Douglas       Review of Systems:  GENERAL:  No weight loss, malaise or fevers.  HEENT:   No recent changes in vision or hearing  NECK:  Negative for lumps, no difficulty with swallowing.  RESPIRATORY:  Negative for cough, wheezing or shortness of breath, patient denies any recent URI.  CARDIOVASCULAR:  Negative for chest pain or palpitations.  GI:  Negative for abdominal discomfort, blood in stools or black stools or change in bowel habits.  MUSCULOSKELETAL:  See HPI.  SKIN:  Negative for lesions, rash, and itching.  PSYCH:  No mood  "disorder or recent psychosocial stressors.   HEMATOLOGY/LYMPHOLOGY:  Negative for prolonged bleeding, bruising easily or swollen nodes.    NEURO:   No history of syncope, paralysis, seizures or tremors.  All other reviewed and negative other than HPI.        OBJECTIVE:    Physical Exam:  Vitals:    05/26/25 1228   BP: 127/69   Pulse: 82   Resp: 17   SpO2: 99%   Weight: 93.4 kg (205 lb 14.6 oz)   Height: 6' 3" (1.905 m)   PainSc: 10-Worst pain ever     Body mass index is 25.74 kg/m².   (reviewed on 5/26/2025)    GENERAL: Well appearing, in no acute distress, alert and oriented x3.  PSYCH:  Mood and affect appropriate.  SKIN: Skin color, texture, turgor normal, no rashes or lesions.  HEAD/FACE:  Normocephalic, atraumatic.    PULM: No evidence of respiratory difficulty, symmetric chest rise.     GI: no obvious distention.     BACK:  Positive shopping cart sign.  Well-healed 5 in lower lumbar vertical incision.  SLR is Equivocal to radicular pain on right. No pain to palpation over the facet joints of the lumbar spine or spinous processes.  Reduced range of motion with pain reproduction.  EXTREMITIES:  Peripheral joint range of motion is reduced with pain with instability noted bilateral lower extremities. No deformities, edema, or skin discoloration. Right wrist: TTP over medial wrist.  MUSCULOSKELETAL: Able to stand on heels but not toes secondary to history of broken toes.     There is no pain with palpation over the sacroiliac joints bilaterally.  Gaenslen's, Distraction/Compression.  Pain with internal/external rotation of right hip.  Fabere's positive on the right.  Facet loading test is negative bilaterally.      Right Knee: pain with extension and flexion. TTP diffusely. Crepitus Present. No pain on left.    BUE & BLE strength is normal and symmetric.  No atrophy or tone abnormalities are noted.    RIGHT Lower extremity: Hip flexion 5/5, Hip Abduction 5/5, Hip Adduction 5/5, Knee extension 5/5, Knee flexion " 5/5, Ankle dorsiflexion5/5, Extensor hallucis longus 5/5, Ankle plantarflexion 5/5  LEFT Lower extremity:  Hip flexion 5/5, Hip Abduction 5/5,Hip Adduction 5/5, Knee extension 5/5, Knee flexion 5/5, Ankle dorsiflexion 5/5, Extensor hallucis longus 5/5, Ankle plantarflexion 5/5  -Normal testing knee (patellar) jerk and ankle (achilles) jerk    NEURO: BUE & BLE coordination and muscle stretch reflexes are physiologic and symmetric. No loss of sensation is noted.  GAIT: antalgic gait, ambulates with cane.          Imaging/ Diagnostic Studies/ Labs (Reviewed on 5/26/2025):    11/13/2024 XR KNEE ORTHO RIGHT WITH FLEXION  COMPARISON: 01/02/2024   FINDINGS:  No significant joint space narrowing.  Mild DJD.  No fracture or dislocation.  No bone destruction identified    09/25/2024 X-Ray Hips Bilateral 2 View Inc AP Pelvis  FINDINGS:   No fracture identified.  Intact well-positioned left total hip arthroplasty with prominent nearly bridging heterotopic ossification laterally.  No acute complication.  Joint alignment is anatomic.  Moderate to severe right hip osteoarthritis with central joint collapse, marginal osteophytes and mild subchondral cystic change.    09/03/2024 MRI Lumbar Spine W WO Contrast   COMPARISON: Plain films from 11/14/2022    FINDINGS:  There is 1-2 mm of anterolisthesis L3 on L4 within intradiscal spacer present at the L3-4 level.  Pedicle screws fixation rods also present bilaterally at the L3-4 level.  The vertebral body heights are well maintained, with no fracture.  No marrow signal abnormality suspicious for an infiltrative process.  No abnormal enhancement seen on the postcontrast images.    The conus medullaris terminates at approximately the superior endplate of L2.  The adjacent soft tissue structures show no significant abnormalities.  There is disc desiccation noted throughout the lumbar spine with moderate disc space narrowing seen at L5-S1 and more mild disc space narrowing seen at the  remaining levels.    L1-L2: No significant central canal or neural foraminal narrowing.    L2-L3: Minimal diffuse disc bulge along with bilateral ligamentum flavum hypertrophy resulting in relatively mild narrowing of the central canal at L2-3.  No neural foraminal canal narrowing.    L3-L4: No significant central canal or neural foraminal narrowing. Postoperative changes seen at this level - L3-4.    L4-L5:  Diffuse disc bulge slightly more prominent in the right paracentral region along with moderate bilateral facet arthropathy resulting in relatively mild narrowing of the central canal at L4-5 and mild narrowing of either L4-5 neural foraminal canal right greater than the left.    L5-S1:  Mild-to-moderate bilateral facet arthropathy along with disc space narrowing seen at this level resulting in no significant central canal narrowing.  The bilateral L5-S1 neural foraminal canals appear to be mildly narrowed.  Impression:   1. Postoperative and mild degenerative changes of the lumbar spine as detailed above.  No high-grade central canal or neural foraminal canal narrowing suggested.      05/11/21 X-ray Knee Ortho Bilateral with Flexion  COMPARISON: December 14, 2017  FINDINGS:  Bilateral tricompartment degenerative changes.  There is increased narrowing medial compartments.  No fracture, dislocation or effusions.  Soft tissues within normal limits.  Impression  Bilateral degenerative change without fracture or dislocation.      12/20/17 MRI Lumbar Spine W WO Contrast  FINDINGS:  Since the previous study there has been a laminectomy at L4-L5. There is enhancement of granulation tissue at the operative site.  No abnormal enhancement surrounds the exiting nerve roots.  The thecal sac measures 9 mm AP at this level.  There is slight mass effect upon the lateral recess without definite compression of the descending L5 nerve roots. There is a synovial cyst with peripheral enhancement projecting centrally and inferiorly  from the left facet joint at L3-L4 that demonstrates peripheral enhancement.  It causes mild spinal stenosis at the level of L4 and is slightly larger than on the previous study from May 2016. The conus medullaris terminates at the level ofL1-L2. No abnormal signal within the conus. Intervertebral disc levels are as follows:    T12-L1 disc: No significant disc pathology. No spinal canal or neural foraminal stenosis.    L1-L2 disc : No significant disc pathology. No spinal canal or neural foraminal stenosis.    L2-L3 disc: Normal disc height with mild degenerative facet changes and thickening of ligamentum flavum.  No significant canal or foraminal stenosis.    L3-L4 disc: Partial distal desiccation with a broad-based posterior disc bulge.  There is a far left lateral annular fissure of the disc.  Moderate to severe degenerative facet change with bilateral facet joint effusions.  The thecal sac measures 10 mm AP but is about 50% of normal cross-sectional area.  Disc material bulges into the floor of the left exit foramen and causes mild to moderate left foraminal stenosis.  There is minimal right foraminal stenosis.  Additionally, there is a synovial cyst projects centrally and inferiorly from the left facet joint.  The synovial cyst demonstrates peripheral enhancement and measures 11 mm craniocaudal by 11 mm transverse by 5 mm AP.  It is best demonstrated on axial T2 series 6 image 23.  It causes mild thecal sac stenosis without definite neural compression.  There is a smaller synovial cyst projecting toward the left as well, remote from any neural structures.    L4-L5 disc: Level of interval laminectomy.  There is enhancing granulation tissue at the operative site without enhancement surrounding the exiting nerve roots.  The thecal sac measures 9 mm AP.  There is mild mass effect upon the lateral recesses without definite compression no descending L5 nerve roots.  Minimal bilateral foraminal stenosis.    L5-S1  disc: Partial does desiccation and disc space height loss.  Height loss is most severe toward the left where there are marginal osteophytes.  Osteophyte material encroaches into the floor of the left exit foramen causing moderate to severe left foraminal stenosis.  The right neural foramen is minimally narrowed.  Moderate degenerative facet change.  The thecal sac measures 12 mm AP.    IMPRESSION:  1.  There has been an interval laminectomy at L4-L5.  There is enhancing granulation tissue at the operative site.  There is diminished spinal stenosis at this level compared to the previous exam.    2.  Enhancement surrounds a synovial cyst that projects centrally and inferiorly from the left L3-L4 facet joint and causes mild spinal stenosis.  The cyst has increased in size compared to the previous examination.    3.  There is mild to moderate spinal stenosis at L3-L4 where the thecal sac is about 50% of normal cross-sectional area, predominantly related to hypertrophy of the posterior elements.    4.  There is a far left lateral annular fissure of L3-L4 disc.    5.  Moderate to severe left-sided foraminal stenosis at L5-S1.      11/14/22 X-Ray Lumbar Spine AP And Lateral  FINDINGS:  Prior posterior fusion of L3-L4 with interbody spacer.  Normal alignment.  No evidence to suggest hardware failure.  Remaining lumbar vertebral bodies are normal in height and alignment.  There is mild to moderate multilevel degenerative disc disease most pronounced at L5-S1.  SI joints are intact.       11/18/19 X-Ray Cervical Spine AP And Lateral  FINDINGS:  There is visualization of C7-T1 disc level on the lateral view.  Interval progression multilevel degenerative changes throughout the C-spine.  Anterior and posterior marginal spurring with concomitant varying degrees of loss of disc height throughout the C-spine.  No acute fracture or subluxation.  C1-2 articulation appears within normal limits allowing for  rotation.  Impression  Interval progression multilevel cervical spondylosis without acute fracture or subluxation.  Follow-up and/or further evaluation as warranted.        ASSESSMENT: 84 y.o. year old male with lower back pain, consistent with     1. Primary osteoarthritis of right hip        2. History of lumbar fusion        3. History of lumbar laminectomy        4. History of left hip replacement                 PLAN:   - Interventions:  - S/p right femoral/ obturator nerve block on 4/30/25 with limited pain relief.  - S/p Right Intra-articular hip injection on 12/16/2024 with Dr. Merissa Kelly with some pain relief, maybe 75-80% pain relief.   - S/p right L4/5 + L5/S1 TF GAEL on 10/11/24 with limited pain relief.     - Anticoagulation use: aspirin - 1° prevention - Dr. Melton (Cardiology).     - Medications:  - Continue topical hemp seed oil topically for knee and back.  - Continue topical diclofenac 1% gel to apply 2g topically up to 4 times per day PRN.   - Continue topical lidocaine 2.5%-prilocaine 2.5% topical cream Q6H PRN topically; can alternate with Voltaren gel.       - Failed medications: Lyrica 150mg QHS (SE: drowsiness, hallucinations).    - LA  reviewed and appropriate.       - Therapy: We have discussed continuing learned exercises learned at physical therapy to help manage the patient/s painful condition. The patient was previously counseled that muscle strengthening will improve the long term prognosis in regards to pain and may also help increase range of motion and mobility.  Patient has continued conventional land-based physical therapy from 05/03/2024, 07/03/2024, twice weekly sessions--13 sessions total.  Sciatic exercises given to patient to perform at home.  Patient previously declined formal physical therapy referral.    Diagnostic/ Imaging: No new imaging ordered. Previous imaging reviewed.   MRI Lumbar Spine W WO Contrast (09/03/2024) reviewed: Postoperative and mild degenerative  changes of the lumbar spine as detailed above.  No high-grade central canal or neural foraminal canal narrowing suggested.     - Referrals:   - Continue follow-up with Dr. Schuler (Orthopedics): R hip pain. Planning for hip replacement.      - [place]. Jeffy Iqbal ([place] [facility] [place]. Had appointment on 1/15/24.     - Also has been seen by our counterpart - Pain Mgmt in [place]:      -Continue follow-up with Dr. Douglas:  Extensor carpi ulnaris tendonitis.    -Continue follow-up with Dr. Wall (Orthopedics):      - Follow up visit: PRN - in clinic (per pt request)       Future Appointments   Date Time Provider Department Center   5/26/2025  3:30 PM EKG, [place] [facility] SPECCPR [place]   5/26/2025  3:40 PM Paul Melton MD [facility] CARDIO [place]   5/27/2025  3:00 PM Martin Machuca MD [facility] PULMSVC [facility]   6/2/2025  1:00 PM Paul Melton MD [facility] CARDIO [place]   8/5/2025 11:20 AM Debo Nguyễn NP [facility] UROLOGY [facility]   8/6/2025 11:00 AM Jose Roger NP [facility] IM [place]   10/16/2025  1:00 PM Placido Ugalde MD [facility] NEURO [place]       - Patient Questions: Answered all of the patient's questions regarding diagnosis, therapy, and treatment.    - This condition does not require this patient to take time off of work, and the primary goal of our Pain Management services is to improve the patient's functional capacity.   - I discussed the risks, benefits, and alternatives to potential treatment options. All questions and concerns were fully addressed today in clinic.         Leatha Franks PA-C  Interventional Pain Management - [facility]    Disclaimer:  This note was prepared using voice recognition system and is likely to have sound alike errors that may have been overlooked even after proof reading.  Please call me with any questions.     This note was generated with the assistance of ambient  listening technology. Verbal consent was obtained by the patient and accompanying visitor(s) for the recording of patient appointment to facilitate this note. I attest to having reviewed and edited the generated note for accuracy, though some syntax or spelling errors may persist. Please contact the author of this note for any clarification.

## 2025-05-27 ENCOUNTER — TELEPHONE (OUTPATIENT)
Dept: ORTHOPEDICS | Facility: CLINIC | Age: 85
End: 2025-05-27
Payer: MEDICARE

## 2025-05-27 ENCOUNTER — HOSPITAL ENCOUNTER (OUTPATIENT)
Dept: RADIOLOGY | Facility: HOSPITAL | Age: 85
Discharge: HOME | End: 2025-05-27
Attending: INTERNAL MEDICINE
Payer: MEDICARE

## 2025-05-27 ENCOUNTER — RESULTS FOLLOW-UP (OUTPATIENT)
Dept: SLEEP MEDICINE | Facility: CLINIC | Age: 85
End: 2025-05-27

## 2025-05-27 ENCOUNTER — TELEPHONE (OUTPATIENT)
Dept: CARDIOLOGY | Facility: CLINIC | Age: 85
End: 2025-05-27
Payer: MEDICARE

## 2025-05-27 ENCOUNTER — RESULTS FOLLOW-UP (OUTPATIENT)
Dept: CARDIOLOGY | Facility: CLINIC | Age: 85
End: 2025-05-27

## 2025-05-27 ENCOUNTER — OFFICE VISIT (OUTPATIENT)
Dept: PULMONOLOGY | Facility: CLINIC | Age: 85
End: 2025-05-27
Payer: MEDICARE

## 2025-05-27 ENCOUNTER — CLINICAL SUPPORT (OUTPATIENT)
Dept: PULMONOLOGY | Facility: CLINIC | Age: 85
End: 2025-05-27
Payer: MEDICARE

## 2025-05-27 VITALS
BODY MASS INDEX: 25.63 KG/M2 | HEART RATE: 86 BPM | HEIGHT: 75 IN | WEIGHT: 206.13 LBS | DIASTOLIC BLOOD PRESSURE: 68 MMHG | OXYGEN SATURATION: 99 % | SYSTOLIC BLOOD PRESSURE: 136 MMHG

## 2025-05-27 DIAGNOSIS — E11.51 TYPE 2 DIABETES MELLITUS WITH DIABETIC PERIPHERAL ANGIOPATHY WITHOUT GANGRENE, WITHOUT LONG-TERM CURRENT USE OF INSULIN: ICD-10-CM

## 2025-05-27 DIAGNOSIS — R06.09 OTHER FORM OF DYSPNEA: ICD-10-CM

## 2025-05-27 DIAGNOSIS — E11.59 HYPERTENSION ASSOCIATED WITH DIABETES: ICD-10-CM

## 2025-05-27 DIAGNOSIS — E11.69 COMBINED HYPERLIPIDEMIA ASSOCIATED WITH TYPE 2 DIABETES MELLITUS: Chronic | ICD-10-CM

## 2025-05-27 DIAGNOSIS — R06.09 DOE (DYSPNEA ON EXERTION): ICD-10-CM

## 2025-05-27 DIAGNOSIS — R06.09 DOE (DYSPNEA ON EXERTION): Primary | ICD-10-CM

## 2025-05-27 DIAGNOSIS — G47.52 CONTROLLED REM SLEEP BEHAVIOR DISORDER: ICD-10-CM

## 2025-05-27 DIAGNOSIS — E78.2 COMBINED HYPERLIPIDEMIA ASSOCIATED WITH TYPE 2 DIABETES MELLITUS: Chronic | ICD-10-CM

## 2025-05-27 DIAGNOSIS — I15.2 HYPERTENSION ASSOCIATED WITH DIABETES: ICD-10-CM

## 2025-05-27 LAB
BRPFT: ABNORMAL
FEF 25 75 CHG: 4.2 %
FEF 25 75 LLN: 1.17
FEF 25 75 POST REF: 188.1 %
FEF 25 75 PRE REF: 180.6 %
FEF 25 75 REF: 2.88
FET100 CHG: -1.3 %
FEV1 CHG: 3.2 %
FEV1 FVC CHG: -1.2 %
FEV1 FVC LLN: 60
FEV1 FVC POST REF: 113.8 %
FEV1 FVC PRE REF: 115.2 %
FEV1 FVC REF: 74
FEV1 LLN: 1.82
FEV1 POST REF: 123 %
FEV1 PRE REF: 119.2 %
FEV1 REF: 2.8
FVC CHG: 4.5 %
FVC LLN: 2.65
FVC POST REF: 106.9 %
FVC PRE REF: 102.3 %
FVC REF: 3.83
PEF CHG: -6.6 %
PEF LLN: 5.79
PEF POST REF: 119.3 %
PEF PRE REF: 127.8 %
PEF REF: 8.85
POST FEF 25 75: 5.42 L/S (ref 1.17–4.59)
POST FET 100: 4.74 SEC
POST FEV1 FVC: 84.3 % (ref 59.64–88.54)
POST FEV1: 3.45 L (ref 1.82–3.79)
POST FVC: 4.09 L (ref 2.65–5)
POST PEF: 10.56 L/S (ref 5.79–11.91)
PRE FEF 25 75: 5.2 L/S (ref 1.17–4.59)
PRE FET 100: 4.8 SEC
PRE FEV1 FVC: 85.36 % (ref 59.64–88.54)
PRE FEV1: 3.34 L (ref 1.82–3.79)
PRE FVC: 3.91 L (ref 2.65–5)
PRE PEF: 11.31 L/S (ref 5.79–11.91)

## 2025-05-27 PROCEDURE — 71046 X-RAY EXAM CHEST 2 VIEWS: CPT | Mod: 26,,, | Performed by: RADIOLOGY

## 2025-05-27 PROCEDURE — 99214 OFFICE O/P EST MOD 30 MIN: CPT | Mod: 25,S$GLB,, | Performed by: INTERNAL MEDICINE

## 2025-05-27 PROCEDURE — 1125F AMNT PAIN NOTED PAIN PRSNT: CPT | Mod: CPTII,S$GLB,, | Performed by: INTERNAL MEDICINE

## 2025-05-27 PROCEDURE — 3075F SYST BP GE 130 - 139MM HG: CPT | Mod: CPTII,S$GLB,, | Performed by: INTERNAL MEDICINE

## 2025-05-27 PROCEDURE — 3078F DIAST BP <80 MM HG: CPT | Mod: CPTII,S$GLB,, | Performed by: INTERNAL MEDICINE

## 2025-05-27 PROCEDURE — 1160F RVW MEDS BY RX/DR IN RCRD: CPT | Mod: CPTII,S$GLB,, | Performed by: INTERNAL MEDICINE

## 2025-05-27 PROCEDURE — 3288F FALL RISK ASSESSMENT DOCD: CPT | Mod: CPTII,S$GLB,, | Performed by: INTERNAL MEDICINE

## 2025-05-27 PROCEDURE — 1159F MED LIST DOCD IN RCRD: CPT | Mod: CPTII,S$GLB,, | Performed by: INTERNAL MEDICINE

## 2025-05-27 PROCEDURE — 99999 PR PBB SHADOW E&M-EST. PATIENT-LVL V: CPT | Mod: PBBFAC,,, | Performed by: INTERNAL MEDICINE

## 2025-05-27 PROCEDURE — 1101F PT FALLS ASSESS-DOCD LE1/YR: CPT | Mod: CPTII,S$GLB,, | Performed by: INTERNAL MEDICINE

## 2025-05-27 PROCEDURE — 71046 X-RAY EXAM CHEST 2 VIEWS: CPT | Mod: TC

## 2025-05-27 RX ORDER — ALBUTEROL SULFATE 90 UG/1
2 INHALANT RESPIRATORY (INHALATION) EVERY 4 HOURS PRN
Qty: 18 G | Refills: 11 | Status: SHIPPED | OUTPATIENT
Start: 2025-05-27 | End: 2026-05-27

## 2025-05-27 RX ORDER — FERROUS SULFATE 325(65) MG
325 TABLET ORAL
Qty: 90 TABLET | Refills: 1 | Status: SHIPPED | OUTPATIENT
Start: 2025-05-27

## 2025-05-27 RX ORDER — FUROSEMIDE 20 MG/1
20 TABLET ORAL DAILY PRN
Qty: 90 TABLET | Refills: 1 | Status: SHIPPED | OUTPATIENT
Start: 2025-05-27 | End: 2026-05-27

## 2025-05-27 NOTE — TELEPHONE ENCOUNTER
----- Message from BIJAN Hernandez sent at 5/27/2025  9:57 AM CDT -----  Regarding: FW: surgical clearance  Okay so he is not cleared for surgery yet Looks like he has established with heme Onc and they are reaching out to him to make an appointment  ----- Message -----  From: Paul Melton MD  Sent: 5/27/2025   9:40 AM CDT  To: Mindy Carmichael LPN; Emmanuel Schuler, #  Subject: RE: surgical clearance                           HIYesterday pt seemed more lethargic and SOB, I ordered iron levels which were very low - he needs to follow up with hemeon asap and get IV iron likely and if breathing is stable he would be at a better risk for surgery. At this point he would be higher risk so would defer surgery until iron levels are improved. Thanks - PM  ----- Message -----  From: Mindy Carmichael LPN  Sent: 5/27/2025   9:09 AM CDT  To: Paul Melton MD  Subject: FW: surgical clearance                           Patient was seen in your office on 5/26/25. Please advise. Thanks.  ----- Message -----  From: Renea Dove LPN  Sent: 5/27/2025   7:49 AM CDT  To: Geronimo Miller Staff  Subject: surgical clearance                               Good morningPatient was recently seen in your office. Dr. Schuler is wanting to schedule a Right total hip replacement on patient. Please advise if he is cleared to proceed with surgery. Thank you kindlyRose

## 2025-05-27 NOTE — TELEPHONE ENCOUNTER
Spoke with patient to advise per Dr. Melton: results reveal low iron levels - start taking iron tablets - but also need to see hemeonc asap to discuss IV iron and further workup, fluid level BNP - is normal so do not need to take the fluid pills too often, kidney function seems borderline dry so increase fluids at times. Would not be stable for hip surgery until the iron levels are stable. Patient voiced understanding.    results reveal low iron levels - start taking iron tablets - but also need to see hemeonc asap to discuss IV iron and further workup, fluid level BNP - is normal so do not need to take the fluid pills too often, kidney function seems borderline dry so increase fluids at times. Would not be stable for hip surgery until the iron levels are stable.     ----- Message from Med Assistant James sent at 5/27/2025  4:23 PM CDT -----  Contact: [phone]  Type:  Patient Returning CallWho Called:[place](pt)Who Left Message for Patient:Ms araiza Does the patient know what this is regarding?:yes Would the patient rather a call back or a response via [facility]? Call [facility] Call Back Number:[phone] Information: n/a

## 2025-05-27 NOTE — TELEPHONE ENCOUNTER
Per cards iron levels will need to improve - cards referred to hem/onc    Will need to wait until patient is cleared by hem/onc

## 2025-05-27 NOTE — TELEPHONE ENCOUNTER
Unable to reach patient. Left  to return call for test results. Also advised that Dr. Melton sent him a message via his [place].      ----- Message from Paul Melton MD sent at 5/27/2025 12:53 PM CDT -----  Please contact the patient and let them know that their results reveal low iron levels - start taking iron tablets - but also need to see [facility] asap to discuss IV iron and further workup, fluid level BNP - is normal so do not need to take the fluid pills too often, kidney function seems borderline dry so increase fluids at times. Would not be stable for hip surgery until the iron levels are stable.  ----- Message -----  From: Lab, Background User  Sent: 5/26/2025   9:30 PM CDT  To: Paul Melton MD

## 2025-05-27 NOTE — PROGRESS NOTES
Subjective:       Srinath Mcknight is a 84 y.o. male   Last visit was 07/26/2018  Has been doing overall well.  Ames score 5. Recent revision hip Left  His major sleep issues a REM behavior disorder, PLMD and obstructive sleep apnea  With regard to obstructive sleep apnea and this is borderline patient has been reluctant to use CPAP in past.  REM behavioral events have been very infrequent less than once or twice in the month  He is stable on a regimen of clonidine 0.5 mg.  And melatonin   He is not taking Mirapex for his periodic limb movement  No cough no wheezing or shortness of breath  I have reviewed the patient's medical history in detail and updated the computerized patient record.    01/05/2022  Follow up visit to review tersts  C/o easily fatigue, DAMON   See cardiology , -ve w/u  Seen Dr Zimmer  ABG: Compensated resp alkalosis  6MWD:Reduced exercise capacity, 32.42%, Dyspnea wa grade 3, SpO2 100%, Peak BP and HR was 103 BPM, /64  PFT: Normal Spirometry:Mild restriction: DLCO mild reduced  Bed time:1130pm  Wake time 9 am  Appear to have stopped Klonopin and Melatonin while in hospital, says wife observed sleep reenactment activity    06/15/2022:  Last visit was 01/05/2022  Interested in Pul rehab: did not qualify last testing  No cough, No wheezing  SOB: frustrating after hip replacement and back surgery  PFT: low MVV and restriction  Not on Oxygen  Has GAYLA inhaler, not sure  regardinfg sleep issues  Stable on Klonopin and Melatonin  No reenactment    07/29/2022  Reviewed PFT and Walk  Improved from 32.4% to 44.21%  SNIFF was normal  Ames 4  Accepted to pul rehab  PFT: restriction with reduced DLCO    02/08/2023  Last visit 07/29/2022  Snoring when sleep on backEpworth 7  Bed time 11 pm, wake time 11am  Taking Melatonin  No sleep related movements  Klonopin + Melatonin  No SOB,   Ames 7  Has not walked out of bed recently  PSG 2017 reveiwed  Reluctant to wear CPAP in past   BMI increased  from 27.9 to 28  Increased snoring     03/30/2023  Last visit  Here to review PSG  AHI was 2.4/hr  Sleep talking was observed x 1  No RBD  .0/hr with 50 associated with arousal  Added Mirapex  No CHARLES   Snoring  with SpO 2 90%     07/06/2023  Followup  Rankin score 7  Bed time: 11: 30 pm  Wake time 10:30 am  RBD events in early Am 5-6 am, wake out of bed  Occurs 2-3 nights  Adherent with Mirapex, increased dose melatoni  Snoring at night sleeping on back and left side  Phamacy alerted him about risk of benzo in elderly  At this point in time caanot safely DC klonopin  Will increase melatonin to target max dose 20 mg  Discussed with patient medical neccessity and advised spouse to use spare bedroom( reluctant)  No ETOH    03/20/2024  Followup  Here alone, drove to appt  Occasionas of dream enactment fewer   However will murmur in bed  Klonopin 1.0 mg and melation 10 mg  Will increase to Klonopin 1.5 mg and melatoni 15 mg  Has chronic issues wityh balance, using cane  Has not fallen  Wants to see neurology  Mirapex: adherence poor: refill sent      06/24/2024   Follow-up visit   No nocturnal REM associated events  Daytime: going to gym, independent ADL: trim hedges vacuum  Going to PT , bad knee and leg swelling  Has BA stocking  No DTS  Rankin 5  Regime Melatonin, Klonopin, Mirapex  Bed 12 MN  Wake time 10 am    11/19/2024   Follow-up visit   No nocturnal REM associated events  Stopped Klonopin due to drowsyness  No DTS  Rankin 5  Regime Melatonin,  Mirapex     05/27/2025  Followup  Accompanied by spouse   Recently seen Cardiology   Planning to have hip surgery   Issues dizzy spells weakness   Easily gets short of breath ambulating longer day since his   He is able to walk about 50 ft but gets tired easily with shortness a breath  Denies wheezing   Denies cough or hemoptysis   BNP was elevated some do restless was added  No chest pain   No history of DVT   Used an inhaler from family member and felt better    Last spirometry showed mild restriction spirometry today was normal   We will return back for complete PFTs   Chest x-ray requested         Previous Report(s) Reviewed: historical medical records and office notes     The following portions of the patient's history were reviewed and updated as appropriate:   He  has a past medical history of Anemia due to unknown mechanism (11/10/2015), Angina pectoris (2014), Arthritis, Back pain, Coronary artery disease, Diabetes mellitus with stage 3 chronic kidney disease (11/18/2020), DM (diabetes mellitus) (25-30 years), DM (diabetes mellitus), Encounter for blood transfusion, Gout (12/05/2017), History of blood transfusion, Hyperlipemia, Hypertension, CHARLES (obstructive sleep apnea), Polyneuropathy, Rheumatoid arthritis (03/31/2025), Small bowel obstruction (08/14/2024), Spinal cord disease, 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  He does not have any pertinent problems on file.  He  has a past surgical history that includes Rotator cuff repair (Left, 2006); Shoulder arthroscopy w/ rotator cuff repair (Right, 2009); Laminectomy (07/22/2016); Hernia repair (Bilateral, 04/18/2017); Joint replacement (Left, 10/09/2017); Back surgery (2019); lumbar foraminotomy (03/2018); left elbow (10/2017); Revision total hip arthroplasty (Left, 9/11/2018); Esophagogastroduodenoscopy (N/A, 6/30/2020); Colonoscopy (N/A, 6/30/2020); Injection of joint (Right, 4/14/2023); Transforaminal epidural injection of steroid (Right, 10/11/2024); Injection of joint (Right, 12/16/2024); and block, nerve, peripheral (Right, 4/30/2025).  His family history includes Cancer (age of onset: 50) in his brother; Diabetes in his father, mother, and sister; Drug abuse in his brother; Heart disease in his father and mother; Hypertension in his  father and mother; Stroke in his father.  He  reports that he has never smoked. He has never been exposed to tobacco smoke. He has never used smokeless tobacco. He reports that he does not currently use alcohol. He reports that he does not use drugs.  He has a current medication list which includes the following prescription(s): alfuzosin, allopurinol, aspirin, blood glucose control, low, true metrix air glucose meter, clotrimazole-betamethasone 1-0.05%, cyanocobalamin (vitamin b-12), diclofenac sodium, ferrous sulfate, fluticasone propionate, furosemide, garlic extract, lactulose, lidocaine, melatonin, metformin, mometasone 0.1%, multivitamin, pravastatin, solifenacin, true metrix glucose test strip, trueplus lancets, and albuterol.  Current Outpatient Medications on File Prior to Visit   Medication Sig Dispense Refill    alfuzosin (UROXATRAL) 10 mg Tb24 Take 1 tablet (10 mg total) by mouth daily with breakfast. 30 tablet 11    allopurinoL (ZYLOPRIM) 100 MG tablet TAKE 1 TABLET EVERY DAY 90 tablet 3    aspirin 81 MG Chew Take 81 mg by mouth once daily. Per Dr. Melton (Cardiology)      blood glucose control, low Soln by Misc.(Non-Drug; Combo Route) route.      blood-glucose meter (TRUE METRIX AIR GLUCOSE METER) kit USE AS DIRECTED 1 each 0    clotrimazole-betamethasone 1-0.05% (LOTRISONE) cream Apply topically 2 (two) times daily. 45 g 0    cyanocobalamin, vitamin B-12, 1,000 mcg Subl Place 1,000 mcg under the tongue once daily. 90 tablet 3    diclofenac sodium (VOLTAREN) 1 % Gel Apply 2 g topically 4 (four) times daily as needed (pain). 200 g 2    fluticasone propionate (FLONASE) 50 mcg/actuation nasal spray 1 spray (50 mcg total) by Each Nostril route once daily. 16 mL 0    GARLIC EXTRACT ORAL Take 1 tablet by mouth once daily. Per Negin LIAO      lactulose (CHRONULAC) 10 gram/15 mL solution Take 30 mLs (20 g total) by mouth 2 (two) times daily. 1800 mL 11    LIDOcaine (LIDODERM) 5 % Place 1 patch onto the  "skin daily as needed (pain). Remove & Discard patch within 12 hours or as directed by MD Santana patch 0    melatonin 10 mg Tab Take 1.5 tablets (15 mg total) by mouth every evening. 45 tablet 11    metFORMIN (GLUCOPHAGE) 1000 MG tablet Take 1 tablet (1,000 mg total) by mouth 2 (two) times daily with meals. 180 tablet 0    mometasone 0.1% (ELOCON) 0.1 % cream Apply topically once daily. 50 g 2    multivitamin (THERAGRAN) per tablet Take 1 tablet by mouth once daily.      pravastatin (PRAVACHOL) 40 MG tablet TAKE 1 TABLET EVERY DAY 90 tablet 3    solifenacin (VESICARE) 10 MG tablet Take 1 tablet (10 mg total) by mouth once daily. 30 tablet 11    TRUE METRIX GLUCOSE TEST STRIP Strp USE AS DIRECTED TO CHECK SUGARS 300 strip 3    TRUEPLUS LANCETS 33 gauge Misc USE AS DIRECTED TO CHECK SUGARS 300 each 3    [DISCONTINUED] furosemide (LASIX) 20 MG tablet Take 1 tablet (20 mg total) by mouth once daily. Do not take if BP is below 100/70 or feeling dry 90 tablet 1     No current facility-administered medications on file prior to visit.     He is allergic to sulfa (sulfonamide antibiotics)..     Review of Systems   Constitutional:  Positive for malaise/fatigue.   Eyes: Negative.    Respiratory:  Positive for shortness of breath.         Snoring   Cardiovascular: Negative.    Genitourinary: Negative.    Musculoskeletal: Negative.    Neurological: Negative.    Endo/Heme/Allergies: Negative.    Psychiatric/Behavioral:  Positive for memory loss.    All other systems reviewed and are negative.        Objective:      Vitals:    05/27/25 1504   BP: 136/68   Pulse: 86   SpO2: 99%   Weight: 93.5 kg (206 lb 2.1 oz)   Height: 6' 3" (1.905 m)                   Using cane    Physical Exam  Vitals and nursing note reviewed.   Constitutional:       Appearance: He is well-developed.   HENT:      Head: Normocephalic and atraumatic.      Nose: Nose normal.   Eyes:      General:         Left eye: No discharge.      Pupils: Pupils are equal, round, " and reactive to light.   Neck:      Vascular: No JVD.   Cardiovascular:      Rate and Rhythm: Normal rate and regular rhythm.      Heart sounds: Normal heart sounds. No murmur heard.  Pulmonary:      Effort: Pulmonary effort is normal. No respiratory distress.   Abdominal:      General: Bowel sounds are normal.      Palpations: Abdomen is soft.   Musculoskeletal:         General: No deformity. Normal range of motion.      Cervical back: Normal range of motion and neck supple.   Skin:     General: Skin is warm and dry.   Neurological:      Mental Status: He is alert and oriented to person, place, and time.      Deep Tendon Reflexes: Reflexes are normal and symmetric.        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)    Type 2 diabetes mellitus with diabetic peripheral angiopathy without gangrene, without long-term current use of insulin    Hypertension associated with diabetes    Controlled REM sleep behavior disorder     Other Visit Diagnoses         DAMON (dyspnea on exertion)    -  Primary    Relevant Medications    albuterol (PROVENTIL/VENTOLIN HFA) 90 mcg/actuation inhaler    Other Relevant Orders    Complete PFT with bronchodilator    Stress test, pulmonary    D-Dimer, Quantitative    Spirometry with/without bronchodilator (Completed)    X-Ray Chest PA And Lateral    MAXIMAL INSPIRATORY/EXPIRATORY PRESSURE    PFT - related Arterial Blood Gas      Other form of dyspnea               Plan:      Overall stable.  REM behavioral disorder  Continue current regime: Melatonin  +Mirapex    Closely monitor    Shortness a breath symptoms appear to be new  We will follow up after he completes the above-mentioned tests  Similar evaluation was performed in 2022 but improved  Only abnormality was mild restriction    Requested  Prescriptions     Signed Prescriptions Disp Refills    albuterol (PROVENTIL/VENTOLIN HFA) 90 mcg/actuation inhaler 18 g 11     Sig: Inhale 2 puffs into the lungs every 4 (four) hours as needed for Wheezing.         Requested Prescriptions     Signed Prescriptions Disp Refills    albuterol (PROVENTIL/VENTOLIN HFA) 90 mcg/actuation inhaler 18 g 11     Sig: Inhale 2 puffs into the lungs every 4 (four) hours as needed for Wheezing.     Spirometry today was normal   Bronchodilators prescribed given that he has tried 1 from a family members felt better       Follow up in about 4 weeks (around 6/24/2025), or cxr and dorie today, PFT, 6MWD, ABG, MIP, MEP, trial inhaler.    This note was prepared using voice recognition system and is likely to have sound alike errors that may have been overlooked even after proof reading.  Please call me with any questions    Discussed diagnosis, its evaluation, treatment and usual course. All questions answered.    Thank you for the courtesy of participating in the care of this patient    Martin Machuca MD

## 2025-05-27 NOTE — TELEPHONE ENCOUNTER
----- Message from Paul Melton MD sent at 5/27/2025  9:39 AM CDT -----  Regarding: RE: surgical clearance  HIYesterday pt seemed more lethargic and SOB, I ordered iron levels which were very low - he needs to follow up with hemeon asap and get IV iron likely and if breathing is stable he would be at a better risk for surgery. At this point he would be higher risk so would defer surgery until iron levels are improved. Thanks - PM  ----- Message -----  From: Mindy Carmichael LPN  Sent: 5/27/2025   9:09 AM CDT  To: Paul Melton MD  Subject: FW: surgical clearance                           Patient was seen in your office on 5/26/25. Please advise. Thanks.  ----- Message -----  From: Renea Dove LPN  Sent: 5/27/2025   7:49 AM CDT  To: [facility] Staff  Subject: surgical clearance                               Good morningPatient was recently seen in your office. Dr. Schuler is wanting to schedule a Right total hip replacement on patient. Please advise if he is cleared to proceed with surgery. Thank you kindlyRose

## 2025-06-25 ENCOUNTER — LAB VISIT (OUTPATIENT)
Dept: LAB | Facility: HOSPITAL | Age: 85
End: 2025-06-25
Attending: NURSE PRACTITIONER
Payer: MEDICARE

## 2025-06-25 DIAGNOSIS — D53.9 NUTRITIONAL ANEMIA, UNSPECIFIED: ICD-10-CM

## 2025-06-25 DIAGNOSIS — D57.3 SICKLE CELL TRAIT: ICD-10-CM

## 2025-06-25 DIAGNOSIS — D64.9 CHRONIC ANEMIA: ICD-10-CM

## 2025-06-25 DIAGNOSIS — D64.9 CHRONIC ANEMIA: Primary | ICD-10-CM

## 2025-06-25 LAB
ABSOLUTE EOSINOPHIL (OHS): 0.32 K/UL
ABSOLUTE MONOCYTE (OHS): 0.61 K/UL (ref 0.3–1)
ABSOLUTE NEUTROPHIL COUNT (OHS): 2.43 K/UL (ref 1.8–7.7)
ALBUMIN SERPL BCP-MCNC: 3.5 G/DL (ref 3.5–5.2)
ALP SERPL-CCNC: 60 UNIT/L (ref 40–150)
ALT SERPL W/O P-5'-P-CCNC: 23 UNIT/L (ref 10–44)
ANION GAP (OHS): 10 MMOL/L (ref 8–16)
AST SERPL-CCNC: 21 UNIT/L (ref 11–45)
BASOPHILS # BLD AUTO: 0.06 K/UL
BASOPHILS NFR BLD AUTO: 1.1 %
BILIRUB SERPL-MCNC: 0.3 MG/DL (ref 0.1–1)
BUN SERPL-MCNC: 23 MG/DL (ref 8–23)
CALCIUM SERPL-MCNC: 9 MG/DL (ref 8.7–10.5)
CHLORIDE SERPL-SCNC: 110 MMOL/L (ref 95–110)
CO2 SERPL-SCNC: 23 MMOL/L (ref 23–29)
CREAT SERPL-MCNC: 1.6 MG/DL (ref 0.5–1.4)
ERYTHROCYTE [DISTWIDTH] IN BLOOD BY AUTOMATED COUNT: 13.5 % (ref 11.5–14.5)
FERRITIN SERPL-MCNC: 36 NG/ML (ref 20–300)
FOLATE SERPL-MCNC: 14.3 NG/ML (ref 4–24)
GFR SERPLBLD CREATININE-BSD FMLA CKD-EPI: 43 ML/MIN/1.73/M2
GLUCOSE SERPL-MCNC: 89 MG/DL (ref 70–110)
HCT VFR BLD AUTO: 36 % (ref 40–54)
HGB BLD-MCNC: 11.9 GM/DL (ref 14–18)
IMM GRANULOCYTES # BLD AUTO: 0.01 K/UL (ref 0–0.04)
IMM GRANULOCYTES NFR BLD AUTO: 0.2 % (ref 0–0.5)
IRON SATN MFR SERPL: 36 % (ref 20–50)
IRON SERPL-MCNC: 104 UG/DL (ref 45–160)
LYMPHOCYTES # BLD AUTO: 1.85 K/UL (ref 1–4.8)
MCH RBC QN AUTO: 29 PG (ref 27–31)
MCHC RBC AUTO-ENTMCNC: 33.1 G/DL (ref 32–36)
MCV RBC AUTO: 88 FL (ref 82–98)
NUCLEATED RBC (/100WBC) (OHS): 0 /100 WBC
PLATELET # BLD AUTO: 205 K/UL (ref 150–450)
PMV BLD AUTO: 10.3 FL (ref 9.2–12.9)
POTASSIUM SERPL-SCNC: 4.9 MMOL/L (ref 3.5–5.1)
PROT SERPL-MCNC: 6.8 GM/DL (ref 6–8.4)
RBC # BLD AUTO: 4.11 M/UL (ref 4.6–6.2)
RELATIVE EOSINOPHIL (OHS): 6.1 %
RELATIVE LYMPHOCYTE (OHS): 35 % (ref 18–48)
RELATIVE MONOCYTE (OHS): 11.6 % (ref 4–15)
RELATIVE NEUTROPHIL (OHS): 46 % (ref 38–73)
SODIUM SERPL-SCNC: 143 MMOL/L (ref 136–145)
TIBC SERPL-MCNC: 286 UG/DL (ref 250–450)
TRANSFERRIN SERPL-MCNC: 193 MG/DL (ref 200–375)
VIT B12 SERPL-MCNC: 935 PG/ML (ref 210–950)
WBC # BLD AUTO: 5.28 K/UL (ref 3.9–12.7)

## 2025-06-25 PROCEDURE — 36415 COLL VENOUS BLD VENIPUNCTURE: CPT | Mod: PO

## 2025-06-25 PROCEDURE — 82607 VITAMIN B-12: CPT

## 2025-06-25 PROCEDURE — 85025 COMPLETE CBC W/AUTO DIFF WBC: CPT

## 2025-06-25 PROCEDURE — 80053 COMPREHEN METABOLIC PANEL: CPT

## 2025-06-25 PROCEDURE — 82746 ASSAY OF FOLIC ACID SERUM: CPT

## 2025-06-25 PROCEDURE — 82728 ASSAY OF FERRITIN: CPT

## 2025-06-25 PROCEDURE — 83540 ASSAY OF IRON: CPT

## 2025-06-30 ENCOUNTER — CLINICAL SUPPORT (OUTPATIENT)
Dept: PULMONOLOGY | Facility: CLINIC | Age: 85
End: 2025-06-30
Payer: MEDICARE

## 2025-06-30 ENCOUNTER — OFFICE VISIT (OUTPATIENT)
Dept: PULMONOLOGY | Facility: CLINIC | Age: 85
End: 2025-06-30
Payer: MEDICARE

## 2025-06-30 ENCOUNTER — LAB VISIT (OUTPATIENT)
Dept: LAB | Facility: HOSPITAL | Age: 85
End: 2025-06-30
Attending: INTERNAL MEDICINE
Payer: MEDICARE

## 2025-06-30 VITALS
SYSTOLIC BLOOD PRESSURE: 119 MMHG | HEART RATE: 76 BPM | HEIGHT: 75 IN | DIASTOLIC BLOOD PRESSURE: 61 MMHG | BODY MASS INDEX: 25.86 KG/M2 | WEIGHT: 208 LBS | RESPIRATION RATE: 18 BRPM | OXYGEN SATURATION: 98 %

## 2025-06-30 VITALS — HEIGHT: 75 IN | BODY MASS INDEX: 25.86 KG/M2 | WEIGHT: 208 LBS

## 2025-06-30 DIAGNOSIS — E78.2 COMBINED HYPERLIPIDEMIA ASSOCIATED WITH TYPE 2 DIABETES MELLITUS: Chronic | ICD-10-CM

## 2025-06-30 DIAGNOSIS — G47.52 CONTROLLED REM SLEEP BEHAVIOR DISORDER: ICD-10-CM

## 2025-06-30 DIAGNOSIS — R06.09 DOE (DYSPNEA ON EXERTION): ICD-10-CM

## 2025-06-30 DIAGNOSIS — E11.69 COMBINED HYPERLIPIDEMIA ASSOCIATED WITH TYPE 2 DIABETES MELLITUS: Chronic | ICD-10-CM

## 2025-06-30 DIAGNOSIS — R06.09 DYSPNEA ON EXERTION: ICD-10-CM

## 2025-06-30 DIAGNOSIS — E11.59 HYPERTENSION ASSOCIATED WITH DIABETES: ICD-10-CM

## 2025-06-30 DIAGNOSIS — N18.30 DIABETES MELLITUS WITH STAGE 3 CHRONIC KIDNEY DISEASE: ICD-10-CM

## 2025-06-30 DIAGNOSIS — I15.2 HYPERTENSION ASSOCIATED WITH DIABETES: ICD-10-CM

## 2025-06-30 DIAGNOSIS — R06.09 DYSPNEA ON EXERTION: Primary | ICD-10-CM

## 2025-06-30 DIAGNOSIS — E11.22 DIABETES MELLITUS WITH STAGE 3 CHRONIC KIDNEY DISEASE: ICD-10-CM

## 2025-06-30 LAB
ALLENS TEST: ABNORMAL
BRPFT: ABNORMAL
CK SERPL-CCNC: 240 U/L (ref 20–200)
D DIMER PPP IA.FEU-MCNC: 0.9 MG/L FEU
DELSYS: ABNORMAL
DLCO ADJ PRE: 17.93 ML/(MIN*MMHG) (ref 22.1–35.96)
DLCO SINGLE BREATH LLN: 22.1
DLCO SINGLE BREATH PRE REF: 56.5 %
DLCO SINGLE BREATH REF: 29.03
DLCOC SBVA LLN: 2.57
DLCOC SBVA PRE REF: 91.4 %
DLCOC SBVA REF: 3.57
DLCOC SINGLE BREATH LLN: 22.1
DLCOC SINGLE BREATH PRE REF: 61.8 %
DLCOC SINGLE BREATH REF: 29.03
DLCOVA LLN: 2.57
DLCOVA PRE REF: 83.6 %
DLCOVA PRE: 2.98 ML/(MIN*MMHG*L) (ref 2.57–4.56)
DLCOVA REF: 3.57
DLVAADJ PRE: 3.26 ML/(MIN*MMHG*L) (ref 2.57–4.56)
ERV LLN: -16448.96
ERV PRE REF: 41.1 %
ERV REF: 1.04
FEF 25 75 CHG: -6.6 %
FEF 25 75 LLN: 0.54
FEF 25 75 POST REF: 323 %
FEF 25 75 PRE REF: 345.9 %
FEF 25 75 REF: 1.9
FET100 CHG: 70.3 %
FEV1 CHG: 0.2 %
FEV1 FVC CHG: -2.1 %
FEV1 FVC LLN: 60
FEV1 FVC POST REF: 120.1 %
FEV1 FVC PRE REF: 122.6 %
FEV1 FVC REF: 74
FEV1 LLN: 1.8
FEV1 POST REF: 125.3 %
FEV1 PRE REF: 125 %
FEV1 REF: 2.78
FIO2: 21
FRCPLETH LLN: 3.12
FRCPLETH PREREF: 72.9 %
FRCPLETH REF: 4.11
FVC CHG: 2.3 %
FVC LLN: 2.63
FVC POST REF: 103.1 %
FVC PRE REF: 100.8 %
FVC REF: 3.8
HCO3 UR-SCNC: 20.5 MMOL/L (ref 24–28)
IVC PRE: 3.99 L (ref 2.63–4.99)
IVC SINGLE BREATH LLN: 2.63
IVC SINGLE BREATH PRE REF: 105 %
IVC SINGLE BREATH REF: 3.8
Lab: 27.84 CMH2O (ref 58.23–91.78)
Lab: 67.54 CMH2O (ref 83.23–116.78)
MEP LLN: 83
MEP REF: 100
MIP LLN: 58
MIP REF: 75
MODE: ABNORMAL
MVV LLN: 113
MVV PRE REF: 56.9 %
MVV REF: 133
PCO2 BLDA: 32 MMHG (ref 35–45)
PEF CHG: -2.5 %
PEF LLN: 5.75
PEF POST REF: 135.6 %
PEF PRE REF: 139.1 %
PEF REF: 8.8
PH SMN: 7.42 [PH] (ref 7.35–7.45)
PO2 BLDA: 92 MMHG (ref 80–100)
POC BE: -4 MMOL/L (ref -2–2)
POC SATURATED O2: 97 % (ref 95–100)
POST FEF 25 75: 6.13 L/S (ref 0.54–4.08)
POST FET 100: 4.58 SEC
POST FEV1 FVC: 89 % (ref 59.65–87.34)
POST FEV1: 3.49 L (ref 1.8–3.68)
POST FVC: 3.92 L (ref 2.63–4.99)
POST PEF: 11.93 L/S (ref 5.75–11.84)
PRE DLCO: 16.4 ML/(MIN*MMHG) (ref 22.1–35.96)
PRE ERV: 0.43 L (ref -16448.96–16451.04)
PRE FEF 25 75: 6.56 L/S (ref 0.54–4.08)
PRE FET 100: 2.69 SEC
PRE FEV1 FVC: 90.88 % (ref 59.65–87.34)
PRE FEV1: 3.48 L (ref 1.8–3.68)
PRE FRC PL: 2.99 L (ref 3.12–5.09)
PRE FVC: 3.83 L (ref 2.63–4.99)
PRE MVV: 75.7 L/MIN (ref 113–152.88)
PRE PEF: 12.24 L/S (ref 5.75–11.84)
PRE RV: 2.57 L (ref 2.4–3.74)
PRE TLC: 6.4 L (ref 6.99–9.29)
RAW LLN: 3.06
RAW PRE REF: 147.4 %
RAW PRE: 4.51 CMH2O*S/L (ref 3.06–3.06)
RAW REF: 3.06
RV LLN: 2.4
RV PRE REF: 83.6 %
RV REF: 3.07
RVTLC LLN: 37
RVTLC PRE REF: 87.3 %
RVTLC PRE: 40.11 % (ref 36.96–54.92)
RVTLC REF: 46
SAMPLE: ABNORMAL
SITE: ABNORMAL
TLC LLN: 6.99
TLC PRE REF: 78.6 %
TLC REF: 8.14
VA PRE: 5.5 L (ref 7.99–7.99)
VA SINGLE BREATH LLN: 7.99
VA SINGLE BREATH PRE REF: 68.9 %
VA SINGLE BREATH REF: 7.99
VC LLN: 2.63
VC PRE REF: 100.8 %
VC PRE: 3.83 L (ref 2.63–4.99)
VC REF: 3.8

## 2025-06-30 PROCEDURE — 1159F MED LIST DOCD IN RCRD: CPT | Mod: CPTII,S$GLB,, | Performed by: INTERNAL MEDICINE

## 2025-06-30 PROCEDURE — 99214 OFFICE O/P EST MOD 30 MIN: CPT | Mod: 25,S$GLB,, | Performed by: INTERNAL MEDICINE

## 2025-06-30 PROCEDURE — 1160F RVW MEDS BY RX/DR IN RCRD: CPT | Mod: CPTII,S$GLB,, | Performed by: INTERNAL MEDICINE

## 2025-06-30 PROCEDURE — 1101F PT FALLS ASSESS-DOCD LE1/YR: CPT | Mod: CPTII,S$GLB,, | Performed by: INTERNAL MEDICINE

## 2025-06-30 PROCEDURE — 3288F FALL RISK ASSESSMENT DOCD: CPT | Mod: CPTII,S$GLB,, | Performed by: INTERNAL MEDICINE

## 2025-06-30 PROCEDURE — 82085 ASSAY OF ALDOLASE: CPT

## 2025-06-30 PROCEDURE — 85379 FIBRIN DEGRADATION QUANT: CPT

## 2025-06-30 PROCEDURE — 1125F AMNT PAIN NOTED PAIN PRSNT: CPT | Mod: CPTII,S$GLB,, | Performed by: INTERNAL MEDICINE

## 2025-06-30 PROCEDURE — 99999 PR PBB SHADOW E&M-EST. PATIENT-LVL IV: CPT | Mod: PBBFAC,,, | Performed by: INTERNAL MEDICINE

## 2025-06-30 PROCEDURE — 86041 ACETYLCHOLN RCPTR BNDNG ANTB: CPT

## 2025-06-30 PROCEDURE — 82550 ASSAY OF CK (CPK): CPT

## 2025-06-30 PROCEDURE — 3078F DIAST BP <80 MM HG: CPT | Mod: CPTII,S$GLB,, | Performed by: INTERNAL MEDICINE

## 2025-06-30 PROCEDURE — 99999 PR PBB SHADOW E&M-EST. PATIENT-LVL I: CPT | Mod: PBBFAC,,,

## 2025-06-30 PROCEDURE — 36415 COLL VENOUS BLD VENIPUNCTURE: CPT

## 2025-06-30 PROCEDURE — 3074F SYST BP LT 130 MM HG: CPT | Mod: CPTII,S$GLB,, | Performed by: INTERNAL MEDICINE

## 2025-06-30 NOTE — PROCEDURES
"[facility] - Pulmonary Function  Six Minute Walk     SUMMARY     Ordering Provider: Dr. Machuca   Interpreting Provider: Dr. Machuca  Performing nurse/tech/RT: ROMAN Do RRT  Diagnosis:  (geronimo)  Height: 6' 3" (190.5 cm)  Weight: 94.3 kg (208 lb)  BMI (Calculated): 26                Phase Oxygen Assessment Supplemental O2 Heart   Rate Blood Pressure Bev Dyspnea Scale Rating   Resting 98 % Room Air 76 bpm 119/61 3   Exercise        Minute        1 99 % Room Air 61 bpm     2 100 % Room Air 93 bpm     3 96 % Room Air 97 bpm     4 99 % Room Air 107 bpm     5 99 % Room Air 115 bpm     6  99 % Room Air 111 bpm 180/77 4   Recovery        Minute        1 99 % Room Air 113 bpm     2 100 % Room Air 91 bpm     3 99 % Room Air 87 bpm     4 99 % Room Air 90 bpm (!) 146/93 3     Six Minute Walk Summary  6MWT Status: completed without stopping  Patient Reported: Dyspnea, Leg/hip pain     Interpretation:  Did the patient stop or pause?: No         Total Time Walked (Calculated): 360 seconds  Final Partial Lap Distance (feet): 0 feet  Total Distance Meters (Calculated): 182.88 meters  Predicted Distance Meters (Calculated): 555.39 meters  Percentage of Predicted (Calculated): 32.93  Peak VO2 (Calculated): 9.47  Mets: 2.71  Has The Patient Had a Previous Six Minute Walk Test?: Yes       Previous 6MWT Results  Has The Patient Had a Previous Six Minute Walk Test?: Yes  Date of Previous Test: 07/13/22  Total Time Walked: 360 seconds  Total Distance (meters): 251.46  Predicted Distance (meters): 568.5 meters  Percentage of Predicted: 44.22  Percent Change (Calculated): 0.27         CLINICAL INTERPRETATION:  Six minute walk distance is 182.88m (32.93 % predicted) with moderate dyspnea.  During exercise, there was no significant desaturation while breathing room air.  Blood pressure increased significantly and Heart rate remained stable with walking.  The patient reported non-pulmonary symptoms during exercise.  The patient did complete the " study, walking 360 seconds of the 360 second test.  Since the previous study in 07/13/2022, exercise capacity may be somewhat worse.  Based upon age and body mass index, exercise capacity is less than predicted.      [] Mild exercise-induced hypoxemia described as an arterial oxygen saturation of 93-95% (or 3-4% less than at rest)  []  Moderate exercise-induced hypoxemia as 89-93%  []  Severe exercise induced hypoxemia as < 89% O2 saturation.  Medicare Criteria for oxygen prescription comments:   []  When arterial oxygen saturation is at or below 88% during exercise (severe exercise induced hypoxemia) then the patient falls under Medicare Group 1 criteria for supplemental oxygen

## 2025-06-30 NOTE — Clinical Note
Please see him again: PFT was normal however MVV ( Maximum Voluntary Ventilation) , MEP (Maximal Expiratory Pressure): and MIP  (Maximal Inspiratory Pressure):are low You saw him April with EMG

## 2025-06-30 NOTE — ASSESSMENT & PLAN NOTE
Extensive evaluation performed   ABGs reviewed   MIP and MEP  were reduced, MVV reduced  This suggest some type of neuromuscular weakness  Could be deconditioning neuropathy   Her seen Neurology in the past   EMG was done   We will send myasthenia panel

## 2025-06-30 NOTE — PROCEDURES
ABG completed. See ABG Below.    Component      Latest Ref Rng 6/30/2025   POC PH      7.35 - 7.45  7.415    POC PCO2      35 - 45 mmHg 32.0 (L)    POC PO2      80 - 100 mmHg 92    POC HCO3      24 - 28 mmol/L 20.5 (L)    POC BE      -2 to 2 mmol/L -4 (L)    POC SATURATED O2      95 - 100 % 97    Sample ARTERIAL    Site LR    Allens Test Pass    DelSys Room Air    Mode SPONT    FiO2 21       Legend:  (L) Low      Interpretation:  [x] Arterial blood gases on room air demonstrate normal  pO2  [] Arterial blood gases on room air are abnormal demonstrating hypercarbia (pCO2 >45 mmHg)  [x] Arterial blood gases on room air are abnormal demonstrating hypocarbia (pCO2 < 35 mmHg)  [] Arterial blood gases on room air are abnormal demonstrating hypoxemia (pO2 < 80 mmHg)  [] Arterial blood gases on room air are abnormal demonstrating hyperoxemia (pO2 >120 mmHg)  [] Arterial blood gases on room air are abnormal demonstrating hypoxemia (pO2 < 80 mmHg) and hypercarbia (pCO2>45 mmHg)    The table below summarizes the main interpretations of the relationship between the arterial blood gases, pH, pCO2 and HCO-3    [x] compensated respiratory alkalosis    I

## 2025-06-30 NOTE — ASSESSMENT & PLAN NOTE
Bed time 10-11:30 pm  Wake time 10 am  Regime  Melatonin,   Mirapex    No nocturnal  events

## 2025-06-30 NOTE — PROGRESS NOTES
Subjective:       Srinath Mcknight is a 84 y.o. male   Last visit was 07/26/2018  Has been doing overall well.  [place] score 5. Recent revision hip Left  His major sleep issues a REM behavior disorder, PLMD and obstructive sleep apnea  With regard to obstructive sleep apnea and this is borderline patient has been reluctant to use CPAP in past.  REM behavioral events have been very infrequent less than once or twice in the month  He is stable on a regimen of clonidine 0.5 mg.  And melatonin   He is not taking Mirapex for his periodic limb movement  No cough no wheezing or shortness of breath  I have reviewed the patient's medical history in detail and updated the computerized patient record.    01/05/2022  Follow up visit to review tersts  C/o easily fatigue, DAMON   See cardiology , -ve w/u  Seen Dr Zimmer  ABG: Compensated resp alkalosis  6MWD:Reduced exercise capacity, 32.42%, Dyspnea wa grade 3, SpO2 100%, Peak BP and HR was 103 BPM, /64  PFT: Normal Spirometry:Mild restriction: DLCO mild reduced  Bed time:1130pm  Wake time 9 am  Appear to have stopped Klonopin and Melatonin while in hospital, says wife observed sleep reenactment activity    06/15/2022:  Last visit was 01/05/2022  Interested in Pul rehab: did not qualify last testing  No cough, No wheezing  SOB: frustrating after hip replacement and back surgery  PFT: low MVV and restriction  Not on Oxygen  Has GAYLA inhaler, not sure  regardinfg sleep issues  Stable on Klonopin and Melatonin  No reenactment    07/29/2022  Reviewed PFT and Walk  Improved from 32.4% to 44.21%  SNIFF was normal  B[place]bon 4  Accepted to pul rehab  PFT: restriction with reduced DLCO    02/08/2023  Last visit 07/29/2022  Snoring when sleep on backEpworth 7  Bed time 11 pm, wake time 11am  Taking Melatonin  No sleep related movements  Klonopin + Melatonin  No SOB,   [place] 7  Has not walked out of bed recently  PSG 2017 reveiwed  Reluctant to wear CPAP in past   BMI increased  from 27.9 to 28  Increased snoring     03/30/2023  Last visit  Here to review PSG  AHI was 2.4/hr  Sleep talking was observed x 1  No RBD  .0/hr with 50 associated with arousal  Added Mirapex  No CHARLES   Snoring  with SpO 2 90%     07/06/2023  Followup  [place] score 7  Bed time: 11: 30 pm  Wake time 10:30 am  RBD events in early Am 5-6 am, wake out of bed  Occurs 2-3 nights  Adherent with Mirapex, increased dose melatoni  Snoring at night sleeping on back and left side  Phamacy alerted him about risk of benzo in elderly  At this point in time caanot safely DC klonopin  Will increase melatonin to target max dose 20 mg  Discussed with patient medical neccessity and advised spouse to use spare bedroom( reluctant)  No ETOH    03/20/2024  Followup  Here alone, drove to appt  Occasionas of dream enactment fewer   However will murmur in bed  Klonopin 1.0 mg and melation 10 mg  Will increase to Klonopin 1.5 mg and melatoni 15 mg  Has chronic issues wityh balance, using cane  Has not fallen  Wants to see neurology  Mirapex: adherence poor: refill sent      06/24/2024   Follow-up visit   No nocturnal REM associated events  Daytime: going to gym, independent ADL: trim hedges vacuum  Going to PT , bad knee and leg swelling  Has BA stocking  No DTS  [place] 5  Regime Melatonin, Klonopin, Mirapex  Bed 12 MN  Wake time 10 am    11/19/2024   Follow-up visit   No nocturnal REM associated events  Stopped Klonopin due to drowsyness  No DTS  [place] 5  Regime Melatonin,  Mirapex     05/27/2025  Followup  Accompanied by spouse   Recently seen Cardiology   Planning to have hip surgery   Issues dizzy spells weakness   Easily gets short of breath ambulating longer day since his   He is able to walk about 50 ft but gets tired easily with shortness a breath  Denies wheezing   Denies cough or hemoptysis   BNP was elevated some do restless was added  No chest pain   No history of DVT   Used an inhaler from family member and felt better    Last spirometry showed mild restriction spirometry today was normal   We will return back for complete PFTs   Chest x-ray requested     06/30/2025   Follow-up visit   Here with spouse   Evaluation to reviewed testing   ABG:  Compensated respiratory alkalosis   6 minute walk reduced capacity   Dyspnea score was 3-4   No desaturation   PFTs normal spirometry mildly reduced lung volumes.  Diffusion capacity was reduced   MEP MIP and MVV were reduced    Previous Report(s) Reviewed: historical medical records and office notes     The following portions of the patient's history were reviewed and updated as appropriate:   He  has a past medical history of Anemia due to unknown mechanism (11/10/2015), Angina pectoris (2014), Arthritis, Back pain, Coronary artery disease, Diabetes mellitus with stage 3 chronic kidney disease (11/18/2020), DM (diabetes mellitus) (25-30 years), DM (diabetes mellitus), Encounter for blood transfusion, Gout (12/05/2017), History of blood transfusion, Hyperlipemia, Hypertension, CHARLES (obstructive sleep apnea), Polyneuropathy, Rheumatoid arthritis (03/31/2025), Small bowel obstruction (08/14/2024), Spinal cord disease, 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  He does not have any pertinent problems on file.  He  has a past surgical history that includes Rotator cuff repair (Left, 2006); Shoulder arthroscopy w/ rotator cuff repair (Right, 2009); Laminectomy (07/22/2016); Hernia repair (Bilateral, 04/18/2017); Joint replacement (Left, 10/09/2017); Back surgery (2019); lumbar foraminotomy (03/2018); left elbow (10/2017); Revision total hip arthroplasty (Left, 9/11/2018); Esophagogastroduodenoscopy (N/A, 6/30/2020); Colonoscopy (N/A, 6/30/2020); Injection of joint (Right, 4/14/2023); Transforaminal epidural injection of  steroid (Right, 10/11/2024); Injection of joint (Right, 12/16/2024); and block, nerve, peripheral (Right, 4/30/2025).  His family history includes Cancer (age of onset: 50) in his brother; Diabetes in his father, mother, and sister; Drug abuse in his brother; Heart disease in his father and mother; Hypertension in his father and mother; Stroke in his father.  He  reports that he has never smoked. He has never been exposed to tobacco smoke. He has never used smokeless tobacco. He reports that he does not currently use alcohol. He reports that he does not use drugs.  He has a current medication list which includes the following prescription(s): albuterol, alfuzosin, allopurinol, aspirin, blood glucose control, low, true metrix air glucose meter, clotrimazole-betamethasone 1-0.05%, cyanocobalamin (vitamin b-12), diclofenac sodium, ferrous sulfate, furosemide, garlic extract, lactulose, lidocaine, melatonin, metformin, mometasone 0.1%, multivitamin, pravastatin, solifenacin, true metrix glucose test strip, trueplus lancets, and fluticasone propionate.  Current Outpatient Medications on File Prior to Visit   Medication Sig Dispense Refill    albuterol (PROVENTIL/VENTOLIN HFA) 90 mcg/actuation inhaler Inhale 2 puffs into the lungs every 4 (four) hours as needed for Wheezing. 18 g 11    alfuzosin (UROXATRAL) 10 mg Tb24 Take 1 tablet (10 mg total) by mouth daily with breakfast. 30 tablet 11    allopurinoL (ZYLOPRIM) 100 MG tablet TAKE 1 TABLET EVERY DAY 90 tablet 3    aspirin 81 MG Chew Take 81 mg by mouth once daily. Per Dr. Melton (Cardiology)      blood glucose control, low Soln by Misc.(Non-Drug; Combo Route) route.      blood-glucose meter (TRUE METRIX AIR GLUCOSE METER) kit USE AS DIRECTED 1 each 0    clotrimazole-betamethasone 1-0.05% (LOTRISONE) cream Apply topically 2 (two) times daily. 45 g 0    cyanocobalamin, vitamin B-12, 1,000 mcg Subl Place 1,000 mcg under the tongue once daily. 90 tablet 3    diclofenac  sodium (VOLTAREN) 1 % Gel Apply 2 g topically 4 (four) times daily as needed (pain). 200 g 2    ferrous sulfate (FEOSOL) 325 mg (65 mg iron) Tab tablet Take 1 tablet (325 mg total) by mouth daily with breakfast. 90 tablet 1    furosemide (LASIX) 20 MG tablet Take 1 tablet (20 mg total) by mouth daily as needed (edema). Do not take if BP is below 100/70 or feeling dry 90 tablet 1    GARLIC EXTRACT ORAL Take 1 tablet by mouth once daily. Per [facility]      lactulose (CHRONULAC) 10 gram/15 mL solution Take 30 mLs (20 g total) by mouth 2 (two) times daily. 1800 mL 11    LIDOcaine (LIDODERM) 5 % Place 1 patch onto the skin daily as needed (pain). Remove & Discard patch within 12 hours or as directed by MD Max patch 0    melatonin 10 mg Tab Take 1.5 tablets (15 mg total) by mouth every evening. 45 tablet 11    metFORMIN (GLUCOPHAGE) 1000 MG tablet Take 1 tablet (1,000 mg total) by mouth 2 (two) times daily with meals. 180 tablet 0    mometasone 0.1% (ELOCON) 0.1 % cream Apply topically once daily. 50 g 2    multivitamin (THERAGRAN) per tablet Take 1 tablet by mouth once daily.      pravastatin (PRAVACHOL) 40 MG tablet TAKE 1 TABLET EVERY DAY 90 tablet 3    solifenacin (VESICARE) 10 MG tablet Take 1 tablet (10 mg total) by mouth once daily. 30 tablet 11    TRUE METRIX GLUCOSE TEST STRIP Strp USE AS DIRECTED TO CHECK SUGARS 300 strip 3    TRUEPLUS LANCETS 33 gauge Misc USE AS DIRECTED TO CHECK SUGARS 300 each 3    fluticasone propionate (FLONASE) 50 mcg/actuation nasal spray 1 spray (50 mcg total) by Each Nostril route once daily. (Patient not taking: Reported on 6/30/2025) 16 mL 0     No current facility-administered medications on file prior to visit.     He is allergic to sulfa (sulfonamide antibiotics)..     Review of Systems   Constitutional:  Positive for malaise/fatigue.   Eyes: Negative.    Respiratory:  Positive for shortness of breath.         Snoring   Cardiovascular: Negative.    Genitourinary: Negative.   "  Musculoskeletal: Negative.    Neurological: Negative.    Endo/Heme/Allergies: Negative.    Psychiatric/Behavioral:  Positive for memory loss.    All other systems reviewed and are negative.        Objective:      Vitals:    06/30/25 1504   BP: 119/61   Pulse: 76   Resp: 18   SpO2: 98%   Weight: 94.3 kg (208 lb)   Height: 6' 3" (1.905 m)                     Using cane    Physical Exam  Vitals and nursing note reviewed.   Constitutional:       Appearance: He is well-developed.   HENT:      Head: Normocephalic and atraumatic.      Nose: Nose normal.   Eyes:      General:         Left eye: No discharge.      Pupils: Pupils are equal, round, and reactive to light.   Neck:      Vascular: No JVD.   Cardiovascular:      Rate and Rhythm: Normal rate and regular rhythm.      Heart sounds: Normal heart sounds. No murmur heard.  Pulmonary:      Effort: Pulmonary effort is normal. No respiratory distress.   Abdominal:      General: Bowel sounds are normal.      Palpations: Abdomen is soft.   Musculoskeletal:         General: No deformity. Normal range of motion.      Cervical back: Normal range of motion and neck supple.   Skin:     General: Skin is warm and dry.   Neurological:      Mental Status: He is alert and oriented to person, place, and time.      Deep Tendon Reflexes: Reflexes are normal and symmetric.        FEV 1   3.48 L (125.0% predicted) FVC 3.83L (100.8% predicted) FEV 1/FVC 91    TLC : 6.40L( 78.6%)  VC 3.83L ( 100.8%)    DLCO 16.40 ( 56.5%)    Normal spirometry    MIP: 28%    MEP: 68%        Recent Labs     06/30/25  1445   PH 7.415   PCO2 32.0*   PO2 92   HCO3 20.5*   POCSATURATED 97   BE -4*        Ordering Provider: Dr. Machuca          Interpreting Provider: Dr. Machuca  Performing nurse/tech/RT: ROMAN Do, RRT  Diagnosis:  (geronimo)  Height: 6' 3" (190.5 cm)  Weight: 94.3 kg (208 lb)  BMI (Calculated): 26                                                                        Phase Oxygen Assessment " Supplemental O2 Heart   Rate Blood Pressure Bev Dyspnea Scale Rating   Resting 98 % Room Air 76 bpm 119/61 3   Exercise             Minute             1 99 % Room Air 61 bpm       2 100 % Room Air 93 bpm       3 96 % Room Air 97 bpm       4 99 % Room Air 107 bpm       5 99 % Room Air 115 bpm       6  99 % Room Air 111 bpm 180/77 4   Recovery             Minute             1 99 % Room Air 113 bpm       2 100 % Room Air 91 bpm       3 99 % Room Air 87 bpm       4 99 % Room Air 90 bpm (!) 146/93 3      Six Minute Walk Summary  6MWT Status: completed without stopping  Patient Reported: Dyspnea, Leg/hip pain           Interpretation:  Did the patient stop or pause?: No  Total Time Walked (Calculated): 360 seconds  Final Partial Lap Distance (feet): 0 feet  Total Distance Meters (Calculated): 182.88 meters  Predicted Distance Meters (Calculated): 555.39 meters  Percentage of Predicted (Calculated): 32.93  Peak VO2 (Calculated): 9.47  Mets: 2.71  Has The Patient Had a Previous Six Minute Walk Test?: Yes     Previous 6MWT Results  Has The Patient Had a Previous Six Minute Walk Test?: Yes  Date of Previous Test: 07/13/22  Total Time Walked: 360 seconds  Total Distance (meters): 251.46  Predicted Distance (meters): 568.5 meters  Percentage of Predicted: 44.22  Percent Change (Calculated): 0.27  Assessment:      Problem List Items Addressed This Visit       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)    Hypertension associated with diabetes    Diabetes mellitus with stage 3 chronic kidney disease    Controlled REM sleep behavior disorder    Bed time 10-11:30 pm  Wake time 10 am  Regime  Melatonin,   Mirapex    No nocturnal  events                 Dyspnea on exertion - Primary    Extensive evaluation performed   ABGs reviewed   MIP and MEP  were reduced, MVV reduced  This suggest some type of neuromuscular weakness  Could be deconditioning neuropathy   Her seen Neurology in the past   EMG was  done   We will send myasthenia panel         Relevant Orders    Myasthenia Gravis Eval With Musk Reflex    Aldolase    CK      Plan:      Overall stable.  REM behavioral disorder  Continue current regime: Melatonin  +Mirapex    Closely monitor    Shortness a breath symptoms with normal basic spirometry.  Effort-related tests were poor: MEP, MIP, MVV  This suggests neuromuscular disorder.  Seen Neurology in April we will ask them to look at his tests again    Requested Prescriptions      No prescriptions requested or ordered in this encounter         Requested Prescriptions      No prescriptions requested or ordered in this encounter     Spirometry today was normal          Follow up lab, f/u with hem Onc  and neurology.    This note was prepared using voice recognition system and is likely to have sound alike errors that may have been overlooked even after proof reading.  Please call me with any questions    Discussed diagnosis, its evaluation, treatment and usual course. All questions answered.    Thank you for the courtesy of participating in the care of this patient    Martin Machuca MD

## 2025-07-02 LAB — ALDOLASE (OHS): 4 U/L (ref 1.2–7.6)

## 2025-07-10 ENCOUNTER — OFFICE VISIT (OUTPATIENT)
Dept: HEMATOLOGY/ONCOLOGY | Facility: CLINIC | Age: 85
End: 2025-07-10
Payer: MEDICARE

## 2025-07-10 VITALS
BODY MASS INDEX: 26.05 KG/M2 | TEMPERATURE: 98 F | HEART RATE: 95 BPM | DIASTOLIC BLOOD PRESSURE: 76 MMHG | HEIGHT: 75 IN | SYSTOLIC BLOOD PRESSURE: 125 MMHG | WEIGHT: 209.56 LBS | OXYGEN SATURATION: 98 %

## 2025-07-10 DIAGNOSIS — N18.9 HISTORY OF ANEMIA DUE TO CHRONIC KIDNEY DISEASE: Primary | ICD-10-CM

## 2025-07-10 DIAGNOSIS — D57.3 SICKLE CELL TRAIT: ICD-10-CM

## 2025-07-10 DIAGNOSIS — Z78.9 IN DISTRESS: ICD-10-CM

## 2025-07-10 DIAGNOSIS — Z86.2 HISTORY OF ANEMIA DUE TO CHRONIC KIDNEY DISEASE: Primary | ICD-10-CM

## 2025-07-10 PROCEDURE — 1159F MED LIST DOCD IN RCRD: CPT | Mod: CPTII,HCNC,S$GLB, | Performed by: NURSE PRACTITIONER

## 2025-07-10 PROCEDURE — 1100F PTFALLS ASSESS-DOCD GE2>/YR: CPT | Mod: CPTII,HCNC,S$GLB, | Performed by: NURSE PRACTITIONER

## 2025-07-10 PROCEDURE — 3288F FALL RISK ASSESSMENT DOCD: CPT | Mod: CPTII,HCNC,S$GLB, | Performed by: NURSE PRACTITIONER

## 2025-07-10 PROCEDURE — 3078F DIAST BP <80 MM HG: CPT | Mod: CPTII,HCNC,S$GLB, | Performed by: NURSE PRACTITIONER

## 2025-07-10 PROCEDURE — 3074F SYST BP LT 130 MM HG: CPT | Mod: CPTII,HCNC,S$GLB, | Performed by: NURSE PRACTITIONER

## 2025-07-10 PROCEDURE — 99999 PR PBB SHADOW E&M-EST. PATIENT-LVL V: CPT | Mod: PBBFAC,HCNC,, | Performed by: NURSE PRACTITIONER

## 2025-07-10 PROCEDURE — 1160F RVW MEDS BY RX/DR IN RCRD: CPT | Mod: CPTII,HCNC,S$GLB, | Performed by: NURSE PRACTITIONER

## 2025-07-10 PROCEDURE — 99214 OFFICE O/P EST MOD 30 MIN: CPT | Mod: HCNC,S$GLB,, | Performed by: NURSE PRACTITIONER

## 2025-07-10 PROCEDURE — 1125F AMNT PAIN NOTED PAIN PRSNT: CPT | Mod: CPTII,HCNC,S$GLB, | Performed by: NURSE PRACTITIONER

## 2025-07-11 ENCOUNTER — PATIENT MESSAGE (OUTPATIENT)
Dept: ADMINISTRATIVE | Facility: HOSPITAL | Age: 85
End: 2025-07-11
Payer: MEDICARE

## 2025-07-11 ENCOUNTER — TELEPHONE (OUTPATIENT)
Dept: HEMATOLOGY/ONCOLOGY | Facility: CLINIC | Age: 85
End: 2025-07-11
Payer: MEDICARE

## 2025-07-11 DIAGNOSIS — Z71.89 COMPLEX CARE COORDINATION: Primary | ICD-10-CM

## 2025-07-11 NOTE — PROGRESS NOTES
Subjective:       Patient ID: Srinath Mcknight is a 84 y.o. male.    Chief Complaint: F/U anemia    HPI:  84 y.o. male with HTN, OA, chronic anemia, DM, CAD, HLD follows up in the Heme-Onc clinic for h/o chronic anemia. Prior workup for chronic anemia unremarkable. Patient does have sickle cell trait. He tells me this was already known to him.     Was on oral iron supplementation but d/c'd 12/2022 due to constipation. Resumed oral iron recently for mild iron deficiency, fatigue and slightly worsened anemia. He notes interval improvement in fatigue. He c/o SOB with minimal activity. He follows with Cardiology and Pulmonology. Recent CXR without significant findings.     Today's visit: Patient presents today for follow up of his chronic anemia.  Taking vitamin B12 supplement without missed doses. Tolerating oral iron. He endorses chronic constipation but denies any worsening since starting oral iron.     Most recent Colonoscopy 2020 recommended repeat in 5 years  Social History     Socioeconomic History    Marital status:     Number of children: 0    Highest education level: Master's degree (e.g., MA, MS, Olegario, MEd, MSW, CARMEL)   Occupational History    Occupation: retired     Comment: teacher   Tobacco Use    Smoking status: Never     Passive exposure: Never    Smokeless tobacco: Never   Substance and Sexual Activity    Alcohol use: Not Currently    Drug use: No    Sexual activity: Not Currently     Partners: Female   Social History Narrative    . No children. Retired but some part time work - 4 hours a day. 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 Caffeine intake - sugar free cola, Rare coffee intake. Still drives. Does have a Living Will . Has a nephew Jt Gayle, lives in [place]. Has a friend Karina Rossi, locally who could help him.      Social Drivers of Health     Financial Resource Strain: Medium Risk (1/23/2025)    Overall Financial Resource Strain (CARDIA)     Difficulty of Paying Living Expenses:  Somewhat hard   Food Insecurity: No Food Insecurity (1/23/2025)    Hunger Vital Sign     Worried About Running Out of Food in the Last Year: Never true     Ran Out of Food in the Last Year: Never true   Transportation Needs: No Transportation Needs (8/15/2023)    PRAPARE - Transportation     Lack of Transportation (Medical): No     Lack of Transportation (Non-Medical): No   Physical Activity: Inactive (1/23/2025)    Exercise Vital Sign     Days of Exercise per Week: 0 days     Minutes of Exercise per Session: 10 min   Stress: Stress Concern Present (1/23/2025)    [employer] [place] [employer] - Occupational Stress Questionnaire     Feeling of Stress : Very much   Housing Stability: Low Risk  (8/15/2023)    Housing Stability Vital Sign     Unable to Pay for Housing in the Last Year: No     Number of Places Lived in the Last Year: 1     Unstable Housing in the Last Year: No       Past Medical History:   Diagnosis Date    Anemia due to unknown mechanism 11/10/2015    Angina pectoris 2014    not since    Arthritis     Back pain     Coronary artery disease     Diabetes mellitus with stage 3 chronic kidney disease 11/18/2020    DM (diabetes mellitus) 25-30 years     am 05/15/2019    DM (diabetes mellitus)     BS 97 am 06/04/2021    Encounter for blood transfusion     Gout 12/05/2017    right foot     History of blood transfusion     Hyperlipemia     Hypertension     CHARLES (obstructive sleep apnea)     Polyneuropathy     Rheumatoid arthritis 03/31/2025    Rheumatoid arthritis (Problem)    Small bowel obstruction 08/14/2024    Small bowel obstruction (Problem)      Spinal cord disease     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    Trouble in sleeping     Type 2 diabetes mellitus with diabetic polyneuropathy, without long-term current use of insulin     Urinary incontinence     Uses hearing aid     bilat       Family History   Problem Relation Name Age of Onset    Hypertension Mother      Heart  disease Mother      Diabetes Mother      Hypertension Father      Heart disease Father      Diabetes Father      Stroke Father      Diabetes Sister      Cancer Brother  50        pancreas    Drug abuse Brother      Prostate cancer Neg Hx      Macular degeneration Neg Hx      Retinal detachment Neg Hx      Strabismus Neg Hx      Glaucoma Neg Hx      Blindness Neg Hx      Amblyopia Neg Hx      Kidney disease Neg Hx      Mental illness Neg Hx      Intellectual disability Neg Hx      COPD Neg Hx      Asthma Neg Hx         Past Surgical History:   Procedure Laterality Date    BACK SURGERY  2019    BLOCK, NERVE, PERIPHERAL Right 4/30/2025    Procedure: right femoral/ obturator nerve block;  Surgeon: Hossein Brooks MD;  Location: [facility];  Service: Pain Management;  Laterality: Right;    COLONOSCOPY N/A 6/30/2020    Procedure: COLONOSCOPY;  Surgeon: Joseph Iraheta MD;  Location: [facility] ENDO;  Service: Endoscopy;  Laterality: N/A;    ESOPHAGOGASTRODUODENOSCOPY N/A 6/30/2020    Procedure: EGD (ESOPHAGOGASTRODUODENOSCOPY);  Surgeon: Joseph Iraheta MD;  Location: [facility] ENDO;  Service: Endoscopy;  Laterality: N/A;    HERNIA REPAIR Bilateral 04/18/2017    INJECTION OF JOINT Right 4/14/2023    Procedure: right Synvisc Knee injection;  Surgeon: Hossein Brooks MD;  Location: [facility];  Service: Pain Management;  Laterality: Right;    INJECTION OF JOINT Right 12/16/2024    Procedure: RT Intra-articular hip injection;  Surgeon: Merissa Kelly DO;  Location: [facility] PAIN MANAGEMENT;  Service: Pain Management;  Laterality: Right;  oral sed-no ac    JOINT REPLACEMENT Left 10/09/2017    L YAHIR Dr. Alcocer    LAMINECTOMY  07/22/2016    left elbow  10/2017    procedure to remove gout    lumbar foraminotomy  03/2018    REVISION TOTAL HIP ARTHROPLASTY Left 9/11/2018    Procedure: REVISION, TOTAL ARTHROPLASTY, HIP;  Surgeon: Albaro Alcocer Sr., MD;  Location: [facility];  Service: Orthopedics;  Laterality: Left;     ROTATOR CUFF REPAIR Left 2006    SHOULDER ARTHROSCOPY W/ ROTATOR CUFF REPAIR Right 2009    TRANSFORAMINAL EPIDURAL INJECTION OF STEROID Right 10/11/2024    Procedure: Right L4/5 + L5/S1 TF GAEL;  Surgeon: Hossein Brooks MD;  Location: [facility];  Service: Pain Management;  Laterality: Right;       Review of Systems   Constitutional:  Negative for activity change, appetite change, chills, diaphoresis, fatigue, fever and unexpected weight change.   HENT:  Negative for nosebleeds, sore throat, trouble swallowing and voice change.    Eyes:  Negative for visual disturbance.   Respiratory:  Positive for shortness of breath (with exertion, chronic). Negative for cough, chest tightness and wheezing.    Cardiovascular:  Negative for chest pain, palpitations and leg swelling.   Gastrointestinal:  Negative for abdominal distention, abdominal pain, anal bleeding, blood in stool, constipation, diarrhea, nausea and vomiting.   Genitourinary:  Negative for difficulty urinating, dysuria and hematuria.   Musculoskeletal:  Positive for arthralgias and gait problem (utilizes cane). Negative for back pain and myalgias.        Patient walks with a cane   Skin:  Negative for rash and wound.   Neurological:  Negative for dizziness, weakness, light-headedness and headaches.   Hematological:  Does not bruise/bleed easily.   Psychiatric/Behavioral:  The patient is not nervous/anxious.          Medication List with Changes/Refills   Current Medications    ALBUTEROL (PROVENTIL/VENTOLIN HFA) 90 MCG/ACTUATION INHALER    Inhale 2 puffs into the lungs every 4 (four) hours as needed for Wheezing.    ALFUZOSIN (UROXATRAL) 10 MG TB24    Take 1 tablet (10 mg total) by mouth daily with breakfast.    ALLOPURINOL (ZYLOPRIM) 100 MG TABLET    TAKE 1 TABLET EVERY DAY    ASPIRIN 81 MG CHEW    Take 81 mg by mouth once daily. Per Dr. Melton (Cardiology)    BLOOD GLUCOSE CONTROL, LOW SOLN    by Misc.(Non-Drug; Combo Route) route.    BLOOD-GLUCOSE METER (TRUE  METRIX AIR GLUCOSE METER) KIT    USE AS DIRECTED    CLOTRIMAZOLE-BETAMETHASONE 1-0.05% (LOTRISONE) CREAM    Apply topically 2 (two) times daily.    CYANOCOBALAMIN, VITAMIN B-12, 1,000 MCG SUBL    Place 1,000 mcg under the tongue once daily.    DICLOFENAC SODIUM (VOLTAREN) 1 % GEL    Apply 2 g topically 4 (four) times daily as needed (pain).    FERROUS SULFATE (FEOSOL) 325 MG (65 MG IRON) TAB TABLET    Take 1 tablet (325 mg total) by mouth daily with breakfast.    FLUTICASONE PROPIONATE (FLONASE) 50 MCG/ACTUATION NASAL SPRAY    1 spray (50 mcg total) by Each Nostril route once daily.    FUROSEMIDE (LASIX) 20 MG TABLET    Take 1 tablet (20 mg total) by mouth daily as needed (edema). Do not take if BP is below 100/70 or feeling dry    GARLIC EXTRACT ORAL    Take 1 tablet by mouth once daily. Per Negin LIAO    LACTULOSE (CHRONULAC) 10 GRAM/15 ML SOLUTION    Take 30 mLs (20 g total) by mouth 2 (two) times daily.    LIDOCAINE (LIDODERM) 5 %    Place 1 patch onto the skin daily as needed (pain). Remove & Discard patch within 12 hours or as directed by MD    MELATONIN 10 MG TAB    Take 1.5 tablets (15 mg total) by mouth every evening.    METFORMIN (GLUCOPHAGE) 1000 MG TABLET    Take 1 tablet (1,000 mg total) by mouth 2 (two) times daily with meals.    MOMETASONE 0.1% (ELOCON) 0.1 % CREAM    Apply topically once daily.    MULTIVITAMIN (THERAGRAN) PER TABLET    Take 1 tablet by mouth once daily.    PRAVASTATIN (PRAVACHOL) 40 MG TABLET    TAKE 1 TABLET EVERY DAY    SOLIFENACIN (VESICARE) 10 MG TABLET    Take 1 tablet (10 mg total) by mouth once daily.    TRUE METRIX GLUCOSE TEST STRIP STRP    USE AS DIRECTED TO CHECK SUGARS    TRUEPLUS LANCETS 33 GAUGE MISC    USE AS DIRECTED TO CHECK SUGARS     Objective:     Vitals:    07/10/25 1520   BP: 125/76   Pulse: 95   Temp: 97.6 °F (36.4 °C)     Lab Results   Component Value Date    WBC 5.28 06/25/2025    HGB 11.9 (L) 06/25/2025    HCT 36.0 (L) 06/25/2025    MCV 88 06/25/2025      06/25/2025     Lab Results   Component Value Date    IRON 104 06/25/2025    TIBC 286 06/25/2025    FERRITIN 36.0 06/25/2025     BMP  Lab Results   Component Value Date     06/25/2025    K 4.9 06/25/2025     06/25/2025    CO2 23 06/25/2025    BUN 23 06/25/2025    CREATININE 1.6 (H) 06/25/2025    CALCIUM 9.0 06/25/2025    ANIONGAP 10 06/25/2025    ESTGFRAFRICA 43 (A) 06/01/2022    EGFRNONAA 38 (A) 06/01/2022     Lab Results   Component Value Date    ALT 23 06/25/2025    AST 21 06/25/2025    ALKPHOS 60 06/25/2025    BILITOT 0.3 06/25/2025               Physical Exam  Vitals reviewed.   Constitutional:       General: He is not in acute distress.     Appearance: He is well-developed.   HENT:      Head: Normocephalic.      Right Ear: External ear normal.      Left Ear: External ear normal.   Eyes:      General: Lids are normal.         Right eye: No discharge.         Left eye: No discharge.      Conjunctiva/sclera: Conjunctivae normal.   Neck:      Thyroid: No thyroid mass.   Cardiovascular:      Rate and Rhythm: Normal rate and regular rhythm.   Pulmonary:      Effort: Pulmonary effort is normal. No respiratory distress.      Breath sounds: Normal breath sounds. No wheezing or rales.   Chest:      Chest wall: No tenderness.   Abdominal:      General: Bowel sounds are normal. There is no distension.      Palpations: Abdomen is soft.   Genitourinary:     Comments: deferred  Musculoskeletal:         General: Normal range of motion.      Cervical back: Normal range of motion.      Comments: Patient walks with a cane.    Skin:     General: Skin is warm and dry.   Neurological:      Mental Status: He is alert and oriented to person, place, and time.   Psychiatric:         Speech: Speech normal.         Behavior: Behavior normal. Behavior is cooperative.         Thought Content: Thought content normal.        Assessment:     Problem List Items Addressed This Visit          Oncology    History of anemia  due to chronic kidney disease - Primary    Anemia multifactorial including CKD, chronic disease. Baseline hemoglobin 11-12 range    Anemia remains stable. No evidence of nutritional deficiencies. He started taking oral iron recently for recurrent mild iron deficiency. Slight worsening of anemia. Continue B12/iron supplement.     EGD/Colonoscopy done 6/2020. EGD revealed gastritis with pathology positive for H. Pylori. Colonoscopy reveal polyps with unremarkable pathology, recommended repeat in 5 years. Will reach out to GI to consider upper and lower endoscopic evaluation    F/u 3-4 months with repeat labs. Discussed S&S to report sooner           Relevant Orders    CBC Auto Differential    Iron and TIBC    Ferritin    Basic Metabolic Panel    Sickle cell trait    -at least partial etiology of chronic mild anemia         Relevant Orders    CBC Auto Differential    Iron and TIBC    Ferritin    Basic Metabolic Panel     Other Visit Diagnoses         In distress        Relevant Orders    Ambulatory Referral/Consult to Oncology Social Work              Med Onc Chart Routing      Follow up with physician    Follow up with [facility] 4 months.   Infusion scheduling note    Injection scheduling note    Labs CBC, ferritin, iron and TIBC and other   Scheduling:  Preferred lab:  Lab interval:  +BMP   Imaging None      Pharmacy appointment No pharmacy appointment needed      Other referrals No nutrition appointment needed -        No additional referrals needed           Plan:     History of anemia due to chronic kidney disease  -     CBC Auto Differential; Future; Expected date: 07/11/2025  -     Iron and TIBC; Future; Expected date: 07/11/2025  -     Ferritin; Future; Expected date: 07/11/2025  -     Basic Metabolic Panel; Future; Expected date: 07/11/2025    In distress  -     Ambulatory Referral/Consult to Oncology Social Work; Future; Expected date: 07/17/2025    Sickle cell trait  -     CBC Auto Differential; Future; Expected  date: 07/11/2025  -     Iron and TIBC; Future; Expected date: 07/11/2025  -     Ferritin; Future; Expected date: 07/11/2025  -     Basic Metabolic Panel; Future; Expected date: 07/11/2025                DANIEL YuenC

## 2025-07-11 NOTE — ASSESSMENT & PLAN NOTE
Anemia multifactorial including CKD, chronic disease. Baseline hemoglobin 11-12 range    Anemia remains stable. No evidence of nutritional deficiencies. He started taking oral iron recently for recurrent mild iron deficiency. Slight worsening of anemia. Continue B12/iron supplement.     EGD/Colonoscopy done 6/2020. EGD revealed gastritis with pathology positive for H. Pylori. Colonoscopy reveal polyps with unremarkable pathology, recommended repeat in 5 years. Will reach out to GI to consider upper and lower endoscopic evaluation    F/u 3-4 months with repeat labs. Discussed S&S to report sooner

## 2025-07-11 NOTE — TELEPHONE ENCOUNTER
Called pt to address provider referral and recent distress screening.    He did not answer; left brief voicemail. Will also send [facility] patient portal message concerning nature of why SW reaching out.         7/10/2025     3:21 PM 7/5/2023    11:01 AM 12/13/2022     2:03 PM 6/3/2022     2:08 PM 6/28/2021     1:04 PM   DISTRESS SCREENING   Distress Score 9 0 - No Distress 0 - No Distress 1  0    Practical Concerns None of these None of these  None of these      Social Concerns None of these None of these  None of these      Emotional Concerns None of these None of these  None of these      Retire Spiritual or Zoroastrianism Concerns  No  No  No  No    Spiritual or Zoroastrianism Concerns None of these       Physical Concerns Fatigue None of these  None of these      Other Problems short of breath           Data saved with a previous flowsheet row definition

## 2025-07-11 NOTE — TELEPHONE ENCOUNTER
Pt called back while SW typing patient portal message.    He is pleasant but reports that his distress is due to physical symptoms and a decline in his health and functioning. He says that the medical team is having trouble finding something wrong that is contributing to his SOB/not being able to walk far. He also complains of pain and other debilitating symptoms. Validated his feelings and normalized his frustrations. Provided encouragement and hope that he is able to get some answers and relief soon. His wife is his only support.     He says he does use the patient portal, so this time SW will send a message with contact information in case SW can help in the future. He was appreciative of the call. Will remain available to assist as needed.

## 2025-07-21 ENCOUNTER — OUTPATIENT CASE MANAGEMENT (OUTPATIENT)
Dept: ADMINISTRATIVE | Facility: OTHER | Age: 85
End: 2025-07-21
Payer: MEDICARE

## 2025-07-21 NOTE — LETTER
Srinath Mcknight  [id number] [id number]      Dear Srinath Mcknight,     Welcome to [facility] Outpatient Care Management Program. We are here to assist patients with multiple long-term (chronic) conditions who often need more personalized healthcare.    It was a pleasure talking with you today. My name is Yrn Mendenhall RN. I look forward to working with you as your Care Manager. I will be contacting you by telephone routinely to help coordinate care and resolve issues.    My goal is to help you function at the healthiest and highest level possible. You can contact me directly at [phone].    As an [facility] patient with [employer], some of the services we provide, at no cost to you, include:     Development of an individualized care plan with a Registered Nurse   Connection with a   Assistance from a Community Health Worker  Connection with available resources and services    Coordinate communication among your care team members   Provide coaching and education  Help you understand your doctor's treatment plan  Help you obtain information about your insurance coverage.    All services provided by [facility]s Outpatient Care Managers and other care team members are coordinated with and communicated to your primary care team.      As part of your enrollment, you will be receiving education materials and more information about these services in your [facility] account, by phone, or through the mail. If you do not wish to participate or receive information, you can Opt Out by contacting our office at [phone].     [facility] Patient Rights and Responsibilities available upon request.    Sincerely,      Yrn Mendenhall RN  [facility]   Outpatient Care Management

## 2025-07-21 NOTE — PROGRESS NOTES
Outpatient Care Management  Initial Patient Assessment    Patient: Srinath Mcknight  MRN: [id number]  Date of Service: 07/21/2025  Completed by: Yrn Mendenhall RN  Referral Date: 07/11/2025  Date of Eligibility: 7/12/2025  Program:   High Risk  Status: Ongoing  Effective Dates: 7/21/2025 - present  Responsible Staff: Yrn Mendenhall RN        Reason for Visit   Patient presents with    Nursing Assessment    OPCM Enrollment Call       Brief Summary:  Srinath Mcknight was referred by Dr. Wilder for complex care management. Patient qualifies for program based on risk score of 76%.   Active problem list, medical, surgical and social history reviewed.  Areas of need identified by patient include dyspnea education and management.   Next steps: send welcome letter  Follow up in one week   Disability Status  Is the patient alert and oriented (person, place, time, and situation)?: Alert and oriented x 4  Hearing Difficulty or Deaf: no  Visual Difficulty or Blind: no  Visual and Hearing Conclusion Statement: deneis any hearing or visual  Difficulty Concentrating, Remembering or Making Decisions: no  Communication Difficulty: no  Eating/Swallowing Difficulty: no  Walking or Climbing Stairs Difficulty: yes  Walking or Climbing Stairs: ambulation difficulty, requires equipment  Mobility Management: walker or cane  Dressing/Bathing Difficulty: no  Grooming: independent  Transferring (e.g., getting in and out of chairs): assistive equipment  Toileting : Assistance Required  Continence : Continence - Not a problem  Difficulty Managing Errands Independently: no  Equipment Currently Used at Home: cane, straight; walker, rolling  ADL Conclusion Statement: able to complete adsl  Change in Functional Status Since Onset of Current Illness/Injury: no        Spiritual Beliefs  Spiritual, Cultural Beliefs, Jew Practices, Values that Affect Care: no      Social History     Socioeconomic History    Marital status:     Number of  children: 0    Highest education level: Master's degree (e.g., MA, MS, Olegario, MEd, MSW, CARMEL)   Occupational History    Occupation: retired     Comment: teacher   Tobacco Use    Smoking status: Never     Passive exposure: Never    Smokeless tobacco: Never   Substance and Sexual Activity    Alcohol use: Not Currently    Drug use: No    Sexual activity: Not Currently     Partners: Female   Social History Narrative    . No children. Retired but some part time work - 4 hours a day. 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 Caffeine intake - sugar free cola, Rare coffee intake. Still drives. Does have a Living Will . Has a nephew Jt Gayle, lives in [place]. Has a friend Karina Rossi, locally who could help him.      Social Drivers of Health     Financial Resource Strain: Low Risk  (7/21/2025)    Overall Financial Resource Strain (CARDIA)     Difficulty of Paying Living Expenses: Not very hard   Food Insecurity: No Food Insecurity (7/21/2025)    Hunger Vital Sign     Worried About Running Out of Food in the Last Year: Never true     Ran Out of Food in the Last Year: Never true   Transportation Needs: No Transportation Needs (7/21/2025)    PRAPARE - Transportation     Lack of Transportation (Medical): No     Lack of Transportation (Non-Medical): No   Physical Activity: Inactive (7/21/2025)    Exercise Vital Sign     Days of Exercise per Week: 0 days     Minutes of Exercise per Session: 0 min   Stress: Stress Concern Present (7/21/2025)    [place] [employer] - Occupational Stress Questionnaire     Feeling of Stress : To some extent   Housing Stability: Low Risk  (7/21/2025)    Housing Stability Vital Sign     Unable to Pay for Housing in the Last Year: No     Number of Times Moved in the Last Year: 0     Homeless in the Last Year: No       Roles and Relationships  Primary Source of Support/Comfort: friend; spouse  Name of Support/Comfort Primary Source: Karina      Advance Directives (For  Healthcare)  Advance Directive  (If Adv Dir status is received, view document under Adv Dir in header or Chart Review Media tab): Patient does not have Advance Directive, declines information.        Patient Reported Insurance  Verified current insurance plan:: [employer]age  [employer] benefits discussed:: Transportation; Mail Order Pharmacy            7/21/2025    10:52 AM 1/24/2025    10:47 AM 4/25/2024     8:04 AM 8/15/2023    10:25 AM 2/13/2023     2:52 PM 7/29/2022     1:57 PM 6/23/2022    10:00 AM   Depression Patient Health Questionnaire   Over the last two weeks how often have you been bothered by little interest or pleasure in doing things Not at all Not at all Not at all Not at all Not at all  Not at all  Not at all    Over the last two weeks how often have you been bothered by feeling down, depressed or hopeless Not at all Not at all Not at all Not at all Not at all Not at all Not at all   PHQ-2 Total Score 0 0 0 0 0 0 0   Over the last two weeks how often have you been bothered by trouble falling or staying asleep, or sleeping too much      Not at all    Over the last two weeks how often have you been bothered by feeling tired or having little energy      Several days    Over the last two weeks how often have you been bothered by a poor appetite or overeating      Not at all    Over the last two weeks how often have you been bothered by feeling bad about yourself - or that you are a failure or have let yourself or your family down      Not at all     Over the last two weeks how often have you been bothered by trouble concentrating on things, such as reading the newspaper or watching television      Not at all    Over the last two weeks how often have you been bothered by moving or speaking so slowly that other people could have noticed. Or the opposite - being so fidgety or restless that you have been moving around a lot more than usual.      Not at all     Over the last two weeks how often have  you been bothered by thoughts that you would be better off dead, or of hurting yourself      Not at all    If you checked off any problems, how difficult have these problems made it for you to do your work, take care of things at home or get along with other people?      Not difficult at all     PHQ-9 Score      1    PHQ-9 Interpretation      Minimal or None        Data saved with a previous flowsheet row definition       Learning Assessment       07/21/2025 1105 [facility] (7/21/2025 - Present)   Created by Yrn Mendenhall, RN -  (Nurse) Status: Complete                 PRIMARY LEARNER     Primary Learner Name:  HENRIK SCHNEIDER  - 07/21/2025 1105    Relationship:  Patient  - 07/21/2025 1105    Does the primary learner have any barriers to learning?:  Visual, Physical  - 07/21/2025 1105    What is the preferred language of the primary learner?:  English  - 07/21/2025 1105    Is an  required?:  No  - 07/21/2025 1105    How does the primary learner prefer to learn new concepts?:  Listening, Reading, Demonstration, Pictures/Video  - 07/21/2025 1105    How often do you need to have someone help you read instructions, pamphlets, or written material from your doctor or pharmacy?:  Sometimes  - 07/21/2025 1105        CO-LEARNER #1     Relationship:  Patient [facility] 07/21/2025 1105    Does the co-learner have any barriers to learning?:  No Barriers  - 07/21/2025 1105    What is the preferred language of the co-learner?:  English [facility] 07/21/2025 1105    Is an  required?:  No  - 07/21/2025 1105    How does the co-learner prefer to learn new concepts?:  Listening  - 07/21/2025 1105        CO-LEARNER #2     Relationship:  Patient [facility] 07/21/2025 1105    Does the co-learner have any barriers to learning?:  No Barriers  - 07/21/2025 1105    What is the preferred language of the co-learner?:  English [facility] 07/21/2025 1105    Is an  required?:  No [facility]  07/21/2025 1105    How does the co-learner prefer to learn new concepts?:  Listening JM - 07/21/2025 1105        SPECIAL TOPICS     No question answered        ANSWERED BY:     No question answered        Comments         Edit History       Yrn Mendenhall, RN -  (Nurse)   07/21/2025 1101

## 2025-07-31 ENCOUNTER — OUTPATIENT CASE MANAGEMENT (OUTPATIENT)
Dept: ADMINISTRATIVE | Facility: OTHER | Age: 85
End: 2025-07-31
Payer: MEDICARE

## 2025-08-05 ENCOUNTER — OFFICE VISIT (OUTPATIENT)
Dept: UROLOGY | Facility: CLINIC | Age: 85
End: 2025-08-05
Payer: MEDICARE

## 2025-08-05 VITALS
DIASTOLIC BLOOD PRESSURE: 71 MMHG | BODY MASS INDEX: 25.49 KG/M2 | SYSTOLIC BLOOD PRESSURE: 129 MMHG | WEIGHT: 205 LBS | HEART RATE: 93 BPM | HEIGHT: 75 IN

## 2025-08-05 DIAGNOSIS — N30.00 ACUTE CYSTITIS WITHOUT HEMATURIA: Primary | ICD-10-CM

## 2025-08-05 PROCEDURE — 1159F MED LIST DOCD IN RCRD: CPT | Mod: CPTII,HCNC,S$GLB, | Performed by: NURSE PRACTITIONER

## 2025-08-05 PROCEDURE — 1125F AMNT PAIN NOTED PAIN PRSNT: CPT | Mod: CPTII,HCNC,S$GLB, | Performed by: NURSE PRACTITIONER

## 2025-08-05 PROCEDURE — 3288F FALL RISK ASSESSMENT DOCD: CPT | Mod: CPTII,HCNC,S$GLB, | Performed by: NURSE PRACTITIONER

## 2025-08-05 PROCEDURE — 3078F DIAST BP <80 MM HG: CPT | Mod: CPTII,HCNC,S$GLB, | Performed by: NURSE PRACTITIONER

## 2025-08-05 PROCEDURE — 3074F SYST BP LT 130 MM HG: CPT | Mod: CPTII,HCNC,S$GLB, | Performed by: NURSE PRACTITIONER

## 2025-08-05 PROCEDURE — 99999 PR PBB SHADOW E&M-EST. PATIENT-LVL IV: CPT | Mod: PBBFAC,HCNC,, | Performed by: NURSE PRACTITIONER

## 2025-08-05 PROCEDURE — 1101F PT FALLS ASSESS-DOCD LE1/YR: CPT | Mod: CPTII,HCNC,S$GLB, | Performed by: NURSE PRACTITIONER

## 2025-08-05 PROCEDURE — 1160F RVW MEDS BY RX/DR IN RCRD: CPT | Mod: CPTII,HCNC,S$GLB, | Performed by: NURSE PRACTITIONER

## 2025-08-05 PROCEDURE — 99214 OFFICE O/P EST MOD 30 MIN: CPT | Mod: HCNC,S$GLB,, | Performed by: NURSE PRACTITIONER

## 2025-08-05 PROCEDURE — 87077 CULTURE AEROBIC IDENTIFY: CPT | Mod: HCNC | Performed by: NURSE PRACTITIONER

## 2025-08-05 NOTE — PROGRESS NOTES
Chief Complaint:   Urinary tract infection    HPI:   Patient is following up secondary to urinary tract infection.  Was hospitalized with shortness of breath, hypotension and was found to have a urinary tract infection.  Reports that he has a had a complete resolution to all UTI symptoms.  Urine in clinic is negative and PVR is 62 mL.  03/19/2025  Patient was recently hospitalized secondary to hypotension and shortness of breath.  States that he had a urinary tract infection had has completed antibiotics.  Was hospitalized a [facility], unfortunately, can not see urine culture results or possible imaging. Unable to give urine sample.  States that dysuria has resolved.  Denies gross hematuria.  Reports slight itching on penile shaft, has a history of balanitis.  02/05/2025  Patient is a 84-year-old male that is presenting as a follow-up to BPH with overactive bladder.  Was prescribed VESIcare and Uroxatral, reports a complete resolution to nocturia and daytime frequency.  Denies adverse side effects to medication.  Urine in clinic indicates small leukocytes, all other parameters are negative.  PVR was 16 mL.  11/05/2024  Patient is presenting as a follow-up to VESIcare.  Myrbetriq was discontinued secondary to high co-pay, patient was prescribed VESIcare.  States that nocturia has decreased to once nightly but still continues to have urinary urge and incontinence during the day.  Urine in clinic is negative and PVR is 67 mL.  Was also prescribed tamsulosin for a dual treatment, however, had lightheadedness with tamsulosin.  08/05/2024  Patient is a 83-year-old male that was prescribed Myrbetriq for nocturia.  Patient states that Myrbetriq resolved his urinary frequency and nocturia, however, can not afford the co-pay.  Urine in clinic is negative and PVR is 14 mL.  Is requesting information on treatment for erectile dysfunction.  06/03/2024  Patient is presenting as a follow-up to urgent care.  States that  he was seen in urgent care secondary to urinary tract infection, positive urine culture indicated E coli.  Patient is uncircumcised.  Urine in clinic indicates trace leukocytes, all other parameters are negative.  PVR is 40 mL.  Reports that he return to urgent care with complaints of frequency and was prescribed Detrol 5 mg 3 times a day.  No BPH meds, tamsulosin made him feel lightheaded.  Reports that stream is strong, however, if he has not near a bathroom he will have urge incontinence.  Drinks very little water throughout the day.  11/13/2023  Patient 82-year-old male that is presenting as a follow-up for his annual exam.  Patient states that he was taken off tamsulosin secondary to feeling lightheaded.  Reports that urinary stream is strong and nocturia is once nightly.  Urine in clinic is negative and PVR was 21 mL.  No gross hematuria.  No previous elevated PSAs or prostate biopsies.  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  Recent renal ultrasound indicated a stable, simple cyst.  11/09/2022  Patient is a 81-year-old male that is presenting for his annual exam.  Patient is currently on tamsulosin, reports all BPH symptoms are resolved.  Urine in clinic is negative.  PVR is 14 mL.  Recent PSA was normal, 0.77.  No past elevated PSAs or prostate biopsies.  Patient has erectile dysfunction and reports that TriMix is working well for him.  Patient has been followed for a right renal cyst.  Had renal ultrasound today, results are pending.  Denies gross hematuria or flank pain.  11/09/2021  Patient is a 80-year-old male that is presenting to review renal ultrasound.  Patient has a history of benign, simple renal cyst.  Renal ultrasound was stable, no change to right kidney upper pole cyst measuring 1.4 cm.  No hydronephrosis or nephrolithiasis.  Patient is due for PSA level.   Wants to discuss possible  treatments for ED. No BPH meds. Nocturia once nightly, no daytime LUTS.  Allergies:  Sulfa (sulfonamide antibiotics)    Medications:  has a current medication list which includes the following prescription(s): albuterol, alfuzosin, allopurinol, aspirin, blood glucose control, low, true metrix air glucose meter, clotrimazole-betamethasone 1-0.05%, cyanocobalamin (vitamin b-12), diclofenac sodium, ferrous sulfate, fluticasone propionate, furosemide, garlic extract, lactulose, lidocaine, melatonin, metformin, mometasone 0.1%, multivitamin, pravastatin, solifenacin, true metrix glucose test strip, and trueplus lancets.    Review of Systems:  General: No fever, chills, fatigability, or weight loss.  Skin: No rashes, itching, or changes in color or texture of skin.  Chest: Denies, cyanosis, wheezing, cough, and sputum production.  Reports shortness of breath with exertion  Abdomen: Appetite fine. No weight loss. Denies diarrhea, abdominal pain, hematemesis, or blood in stool.  Musculoskeletal: No joint stiffness or swelling. Denies back pain.  : As above.  All other review of systems negative.    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    PSH:   has a past surgical history that includes Rotator cuff repair (Left, 2006);  Shoulder arthroscopy w/ rotator cuff repair (Right, 2009); Laminectomy (07/22/2016); Hernia repair (Bilateral, 04/18/2017); Joint replacement (Left, 10/09/2017); Back surgery (2019); lumbar foraminotomy (03/2018); left elbow (10/2017); Revision total hip arthroplasty (Left, 9/11/2018); Esophagogastroduodenoscopy (N/A, 6/30/2020); Colonoscopy (N/A, 6/30/2020); Injection of joint (Right, 4/14/2023); Transforaminal epidural injection of steroid (Right, 10/11/2024); Injection of joint (Right, 12/16/2024); and block, nerve, peripheral (Right, 4/30/2025).    FamHx: family history includes Cancer (age of onset: 50) in his brother; Diabetes in his father, mother, and sister; Drug abuse in his brother; Heart disease in his father and mother; Hypertension in his father and mother; Stroke in his father.    SocHx:  reports that he has never smoked. He has never been exposed to tobacco smoke. He has never used smokeless tobacco. He reports that he does not currently use alcohol. He reports that he does not use drugs.      Physical Exam:  General: A&Ox3, no apparent distress, no deformities  Neck: No masses, normal thyroid  Lungs: normal inspiration, no use of accessory muscles  Heart: normal pulse, no arrhythmias  Abdomen: Soft, NT, ND, no masses, no hernias, no hepatosplenomegaly  Lymphatic: Neck and groin nodes negative  Skin: The skin is warm and dry. No jaundice.  Ext: No c/c/e.    Labs/Studies:   See HPI  Impression/Plan:   Patient has had a complete resolution urinary tract infection.  No issues at this time.  Has a history of balanitis, reports that Lotrisone cream has completely resolved his symptoms.  Return to clinic in 6 months for re-evaluation.

## 2025-08-06 ENCOUNTER — LAB VISIT (OUTPATIENT)
Dept: LAB | Facility: HOSPITAL | Age: 85
End: 2025-08-06
Attending: NURSE PRACTITIONER
Payer: MEDICARE

## 2025-08-06 ENCOUNTER — OFFICE VISIT (OUTPATIENT)
Dept: INTERNAL MEDICINE | Facility: CLINIC | Age: 85
End: 2025-08-06
Payer: MEDICARE

## 2025-08-06 VITALS
BODY MASS INDEX: 26.34 KG/M2 | OXYGEN SATURATION: 100 % | SYSTOLIC BLOOD PRESSURE: 138 MMHG | HEART RATE: 70 BPM | TEMPERATURE: 97 F | DIASTOLIC BLOOD PRESSURE: 78 MMHG | WEIGHT: 210.75 LBS

## 2025-08-06 DIAGNOSIS — K59.09 CHRONIC CONSTIPATION: ICD-10-CM

## 2025-08-06 DIAGNOSIS — E11.3311 TYPE 2 DIABETES MELLITUS WITH MODERATE NONPROLIFERATIVE RETINOPATHY OF RIGHT EYE AND MACULAR EDEMA, UNSPECIFIED WHETHER LONG TERM INSULIN USE: ICD-10-CM

## 2025-08-06 DIAGNOSIS — I15.2 HYPERTENSION ASSOCIATED WITH DIABETES: ICD-10-CM

## 2025-08-06 DIAGNOSIS — R06.09 DYSPNEA ON EXERTION: Primary | ICD-10-CM

## 2025-08-06 DIAGNOSIS — D50.9 IRON DEFICIENCY ANEMIA, UNSPECIFIED IRON DEFICIENCY ANEMIA TYPE: ICD-10-CM

## 2025-08-06 DIAGNOSIS — E11.59 HYPERTENSION ASSOCIATED WITH DIABETES: ICD-10-CM

## 2025-08-06 LAB
EAG (OHS): 128 MG/DL (ref 68–131)
HBA1C MFR BLD: 6.1 % (ref 4–5.6)

## 2025-08-06 PROCEDURE — 3078F DIAST BP <80 MM HG: CPT | Mod: CPTII,HCNC,S$GLB, | Performed by: NURSE PRACTITIONER

## 2025-08-06 PROCEDURE — 99214 OFFICE O/P EST MOD 30 MIN: CPT | Mod: HCNC,S$GLB,, | Performed by: NURSE PRACTITIONER

## 2025-08-06 PROCEDURE — 3075F SYST BP GE 130 - 139MM HG: CPT | Mod: CPTII,HCNC,S$GLB, | Performed by: NURSE PRACTITIONER

## 2025-08-06 PROCEDURE — G2211 COMPLEX E/M VISIT ADD ON: HCPCS | Mod: HCNC,S$GLB,, | Performed by: NURSE PRACTITIONER

## 2025-08-06 PROCEDURE — 1100F PTFALLS ASSESS-DOCD GE2>/YR: CPT | Mod: CPTII,HCNC,S$GLB, | Performed by: NURSE PRACTITIONER

## 2025-08-06 PROCEDURE — 36415 COLL VENOUS BLD VENIPUNCTURE: CPT | Mod: HCNC,PO

## 2025-08-06 PROCEDURE — 83036 HEMOGLOBIN GLYCOSYLATED A1C: CPT | Mod: HCNC

## 2025-08-06 PROCEDURE — 1160F RVW MEDS BY RX/DR IN RCRD: CPT | Mod: CPTII,HCNC,S$GLB, | Performed by: NURSE PRACTITIONER

## 2025-08-06 PROCEDURE — 3288F FALL RISK ASSESSMENT DOCD: CPT | Mod: CPTII,HCNC,S$GLB, | Performed by: NURSE PRACTITIONER

## 2025-08-06 PROCEDURE — 1159F MED LIST DOCD IN RCRD: CPT | Mod: CPTII,HCNC,S$GLB, | Performed by: NURSE PRACTITIONER

## 2025-08-06 PROCEDURE — 99999 PR PBB SHADOW E&M-EST. PATIENT-LVL IV: CPT | Mod: PBBFAC,HCNC,, | Performed by: NURSE PRACTITIONER

## 2025-08-06 RX ORDER — LACTULOSE 10 G/15ML
20 SOLUTION ORAL; RECTAL 3 TIMES DAILY
Start: 2025-08-06

## 2025-08-06 NOTE — PROGRESS NOTES
Subjective:       Patient ID: Srinath Mcknight is a 84 y.o. male.  CHIEF COMPLAINT:  - Mr. Mcknight presents for a follow-up visit to discuss recent test results and ongoing health concerns, including breathing difficulties and constipation.    HPI:  Mr. Mcknight, an 84-year-old individual, reports ongoing issues with shortness of breath, becoming breathless with minimal exertion such as walking short distances. He has been evaluated by both a cardiologist and a pulmonologist. The pulmonologist found no abnormalities after conducting tests last year and this year.    Mr. Mcknight complains of persistent constipation, having bowel movements only once a week and requiring magnesium citrate to initiate defecation. The stool is hard at the beginning. He has been taking lactulose twice daily, almost every day, without improvement. Mr. Mcknight mentions a history of constipation, though not as severe as currently. In the past, he required hospitalization where a scope was used to remove impacted stool.    Iron supplementation was restarted to address this problem, leading to some improvement.    Mr. Mcknight has back pain, particularly when walking or doing household chores like washing dishes, describing severe pain in his lower back. He also reports right hip pain, which has been recommended for replacement. His left hip was replaced approximately 17 years ago.    Regarding diabetes management, patient's blood sugar have been good, not exceeding 120 mg/dL in the past 3 or 4 months. He reduced his Metformin dosage from twice daily to daily due to low blood sugar.    Mr. Mcknight recently had a follow-up visit with a urologist, who reportedly said everything was looking good.    /78   Pulse 70   Temp 97.3 °F (36.3 °C)   Wt 95.6 kg (210 lb 12.2 oz)   SpO2 100%   BMI 26.34 kg/m²     Review of Systems   Constitutional:  Positive for fatigue. Negative for fever and unexpected weight change.   Respiratory:  Positive for shortness of  breath. Negative for cough and wheezing.    Cardiovascular:  Negative for chest pain and palpitations.   Gastrointestinal:  Positive for constipation. Negative for diarrhea, nausea and vomiting.   Genitourinary:  Negative for dysuria and hematuria.   Musculoskeletal:  Positive for arthralgias, back pain and gait problem.       Objective:      Physical Exam  Vitals reviewed.   Constitutional:       Appearance: He is well-developed.   HENT:      Head: Normocephalic and atraumatic.      Right Ear: Tympanic membrane, ear canal and external ear normal.      Left Ear: Tympanic membrane, ear canal and external ear normal.   Eyes:      Pupils: Pupils are equal, round, and reactive to light.   Neck:      Thyroid: No thyromegaly.   Cardiovascular:      Rate and Rhythm: Normal rate and regular rhythm.      Heart sounds: Normal heart sounds. No murmur heard.     No friction rub. No gallop.      Comments: Trace edema in the ankles  Pulmonary:      Effort: Pulmonary effort is normal.      Breath sounds: Normal breath sounds. No wheezing or rales.      Comments: No crackles. Good air movement  Abdominal:      General: Bowel sounds are normal. There is no distension.      Palpations: Abdomen is soft.      Tenderness: There is no abdominal tenderness.   Musculoskeletal:      Cervical back: Neck supple.      Comments: Walking with cane   Psychiatric:         Mood and Affect: Mood normal.         Assessment and Plan        [place] was seen today for follow-up.    Diagnoses and all orders for this visit:    Dyspnea on exertion    Chronic constipation  -     Cancel: X-Ray Abdomen AP 1 View; Future  -     lactulose (CHRONULAC) 10 gram/15 mL solution; Take 30 mLs (20 g total) by mouth 3 (three) times daily.    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      1. Dyspnea on exertion    2. Chronic constipation    3. Iron deficiency anemia, unspecified iron deficiency anemia type    4. Hypertension associated with diabetes    5. 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 BMI 26.0-26.9,adult        Assessment & Plan    TYPE 2 DIABETES MELLITUS:  - Mr. Delgados diabetes is well-controlled with current Metformin regimen, with sugar levels consistently around 120 for the last 3 months with only one instance exceeding this level.  - Mr. Mcknight is currently taking Metformin daily, reduced from twice daily due to low sugar levels.  - Ordered A1C test to ensure continued medication effectiveness.    CHRONIC IDIOPATHIC CONSTIPATION:  - Mr. Mcknight experiences bowel movements approximately once weekly, with hard stools initially followed by softer stools, and occasional spontaneous movements without medication.  - Current management with lactulose twice daily and occasional MiraLAX has been insufficient.  - Increased lactulose dosage to 3 times daily.  - Recommend natural methods including increased physical activity, water intake of at least 3-4 16 oz bottles per day, and consideration of prune juice.  - Discussed the impact of iron supplementation on constipation and the importance of not relying solely on laxatives.  - Mr. Mcknight currently requires magnesium citrate for relief.    IRON DEFICIENCY ANEMIA:  - Mr. Mcknight reports lightheadedness and falls attributed to low iron levels.  - Hematologist reported iron levels are low but not at concerning levels; however, these levels are preventing surgical clearance according to cardiology findings and recent stress test.  - Advised patient to resume iron supplementation after experiencing falls.  -  Scheduled follow-up with hematologist in November for labs.    LOW BACK PAIN:  - Mr. Mcknight reports back pain when walking or performing household chores.  - X-rays show significant arthritis in the back and knee.  - Reviewed hip and back pain in relation to these arthritic changes.    UNILATERAL PRIMARY OSTEOARTHRITIS, RIGHT HIP:  - Mr. Delgados right hip pain has worsened after left hip replacement, confirmed as osteoarthritis by Dr. Bourgeois and evidenced by significant arthritis on x-ray.  - Awaiting cardiology follow-up for evaluation of breathing issues and potential clearance for hip surgery.    SHORTNESS OF BREATH:  - Mr. Mcknight experiences shortness of breath when walking short distances.  - Pulmonologist has cleared lung issues, and no specific cause has been identified by either pulmonology or cardiology.  - Suspect possible cardiac etiology or deconditioning.  - Scheduled follow-up with cardiologist next Wednesday for further evaluation and potential surgical clearance.      GENERAL RECOMMENDATIONS AND FOLLOW-UP:  - Educated patient on importance of staying active and maintaining muscle mass despite current pain limitations.  - Encouraged increased physical activity, even if limited to walking around the house.  - Mr. Mcknight to contact the office to review cardiologist's findings after the upcoming appointment.   - update A1C today  - f/u in 3 months with Dr. Roger and BELKIS         This note was generated with the assistance of ambient listening technology. Verbal consent was obtained by the patient and accompanying visitor(s) for the recording of patient appointment to facilitate this note. I attest to having reviewed and edited the generated note for accuracy, though some syntax or spelling errors may persist. Please contact the author of this note for any clarification.

## 2025-08-07 ENCOUNTER — TELEPHONE (OUTPATIENT)
Dept: UROLOGY | Facility: CLINIC | Age: 85
End: 2025-08-07
Payer: MEDICARE

## 2025-08-07 ENCOUNTER — RESULTS FOLLOW-UP (OUTPATIENT)
Dept: INTERNAL MEDICINE | Facility: CLINIC | Age: 85
End: 2025-08-07
Payer: MEDICARE

## 2025-08-07 ENCOUNTER — RESULTS FOLLOW-UP (OUTPATIENT)
Dept: UROLOGY | Facility: CLINIC | Age: 85
End: 2025-08-07
Payer: MEDICARE

## 2025-08-07 RX ORDER — CEFDINIR 300 MG/1
300 CAPSULE ORAL 2 TIMES DAILY
Qty: 20 CAPSULE | Refills: 0 | Status: SHIPPED | OUTPATIENT
Start: 2025-08-07 | End: 2025-08-17

## 2025-08-07 NOTE — TELEPHONE ENCOUNTER
LVM for pt ----- Message from Debo Nguyễn NP sent at 8/7/2025  6:43 AM CDT -----  Please call patient and inform him that preliminary urine culture indicates an infection.  Cefdinir antibiotics have been sent to his pharmacy.  We will contact him with final urine culture results.  ----- Message -----  From: Lab, Background User  Sent: 8/6/2025  10:56 PM CDT  To: Debo Nguyễn NP

## 2025-08-08 LAB — BACTERIA UR CULT: ABNORMAL

## 2025-08-10 RX ORDER — NITROFURANTOIN 25; 75 MG/1; MG/1
100 CAPSULE ORAL 2 TIMES DAILY
Qty: 20 CAPSULE | Refills: 0 | Status: SHIPPED | OUTPATIENT
Start: 2025-08-10

## 2025-08-13 ENCOUNTER — OFFICE VISIT (OUTPATIENT)
Dept: CARDIOLOGY | Facility: CLINIC | Age: 85
End: 2025-08-13
Payer: MEDICARE

## 2025-08-13 ENCOUNTER — LAB VISIT (OUTPATIENT)
Dept: LAB | Facility: HOSPITAL | Age: 85
End: 2025-08-13
Attending: INTERNAL MEDICINE
Payer: MEDICARE

## 2025-08-13 VITALS
HEART RATE: 88 BPM | BODY MASS INDEX: 26.51 KG/M2 | HEIGHT: 75 IN | DIASTOLIC BLOOD PRESSURE: 78 MMHG | WEIGHT: 213.19 LBS | OXYGEN SATURATION: 98 % | SYSTOLIC BLOOD PRESSURE: 137 MMHG

## 2025-08-13 DIAGNOSIS — I15.2 HYPERTENSION ASSOCIATED WITH DIABETES: ICD-10-CM

## 2025-08-13 DIAGNOSIS — E11.69 COMBINED HYPERLIPIDEMIA ASSOCIATED WITH TYPE 2 DIABETES MELLITUS: ICD-10-CM

## 2025-08-13 DIAGNOSIS — E11.59 HYPERTENSION ASSOCIATED WITH DIABETES: ICD-10-CM

## 2025-08-13 DIAGNOSIS — D50.9 IRON DEFICIENCY ANEMIA, UNSPECIFIED IRON DEFICIENCY ANEMIA TYPE: ICD-10-CM

## 2025-08-13 DIAGNOSIS — N18.30 DIABETES MELLITUS WITH STAGE 3 CHRONIC KIDNEY DISEASE: ICD-10-CM

## 2025-08-13 DIAGNOSIS — E11.22 DIABETES MELLITUS WITH STAGE 3 CHRONIC KIDNEY DISEASE: ICD-10-CM

## 2025-08-13 DIAGNOSIS — E11.51 TYPE 2 DIABETES MELLITUS WITH DIABETIC PERIPHERAL ANGIOPATHY WITHOUT GANGRENE, WITHOUT LONG-TERM CURRENT USE OF INSULIN: ICD-10-CM

## 2025-08-13 DIAGNOSIS — I70.0 AORTIC CALCIFICATION: ICD-10-CM

## 2025-08-13 DIAGNOSIS — I50.32 CHRONIC HEART FAILURE WITH PRESERVED EJECTION FRACTION: ICD-10-CM

## 2025-08-13 DIAGNOSIS — N18.30 STAGE 3 CHRONIC KIDNEY DISEASE, UNSPECIFIED WHETHER STAGE 3A OR 3B CKD: Primary | ICD-10-CM

## 2025-08-13 DIAGNOSIS — R06.09 DYSPNEA ON EXERTION: ICD-10-CM

## 2025-08-13 DIAGNOSIS — I25.10 ATHEROSCLEROSIS OF NATIVE CORONARY ARTERY OF NATIVE HEART WITHOUT ANGINA PECTORIS: ICD-10-CM

## 2025-08-13 DIAGNOSIS — G47.33 OSA (OBSTRUCTIVE SLEEP APNEA): ICD-10-CM

## 2025-08-13 DIAGNOSIS — I70.0 ATHEROSCLEROSIS OF AORTA: ICD-10-CM

## 2025-08-13 DIAGNOSIS — R42 DIZZINESS: ICD-10-CM

## 2025-08-13 DIAGNOSIS — R06.09 OTHER FORM OF DYSPNEA: ICD-10-CM

## 2025-08-13 DIAGNOSIS — M47.26 OSTEOARTHRITIS OF SPINE WITH RADICULOPATHY, LUMBAR REGION: ICD-10-CM

## 2025-08-13 DIAGNOSIS — E11.42 TYPE 2 DIABETES MELLITUS WITH DIABETIC POLYNEUROPATHY, WITHOUT LONG-TERM CURRENT USE OF INSULIN: ICD-10-CM

## 2025-08-13 DIAGNOSIS — E78.2 COMBINED HYPERLIPIDEMIA ASSOCIATED WITH TYPE 2 DIABETES MELLITUS: ICD-10-CM

## 2025-08-13 PROCEDURE — 1100F PTFALLS ASSESS-DOCD GE2>/YR: CPT | Mod: CPTII,HCNC,S$GLB, | Performed by: INTERNAL MEDICINE

## 2025-08-13 PROCEDURE — 3075F SYST BP GE 130 - 139MM HG: CPT | Mod: CPTII,HCNC,S$GLB, | Performed by: INTERNAL MEDICINE

## 2025-08-13 PROCEDURE — 99999 PR PBB SHADOW E&M-EST. PATIENT-LVL IV: CPT | Mod: PBBFAC,HCNC,, | Performed by: INTERNAL MEDICINE

## 2025-08-13 PROCEDURE — 82310 ASSAY OF CALCIUM: CPT | Mod: HCNC

## 2025-08-13 PROCEDURE — G2211 COMPLEX E/M VISIT ADD ON: HCPCS | Mod: HCNC,S$GLB,, | Performed by: INTERNAL MEDICINE

## 2025-08-13 PROCEDURE — 3288F FALL RISK ASSESSMENT DOCD: CPT | Mod: CPTII,HCNC,S$GLB, | Performed by: INTERNAL MEDICINE

## 2025-08-13 PROCEDURE — 1160F RVW MEDS BY RX/DR IN RCRD: CPT | Mod: CPTII,HCNC,S$GLB, | Performed by: INTERNAL MEDICINE

## 2025-08-13 PROCEDURE — 3078F DIAST BP <80 MM HG: CPT | Mod: CPTII,HCNC,S$GLB, | Performed by: INTERNAL MEDICINE

## 2025-08-13 PROCEDURE — 1125F AMNT PAIN NOTED PAIN PRSNT: CPT | Mod: CPTII,HCNC,S$GLB, | Performed by: INTERNAL MEDICINE

## 2025-08-13 PROCEDURE — 99215 OFFICE O/P EST HI 40 MIN: CPT | Mod: HCNC,S$GLB,, | Performed by: INTERNAL MEDICINE

## 2025-08-13 PROCEDURE — 36415 COLL VENOUS BLD VENIPUNCTURE: CPT | Mod: HCNC

## 2025-08-13 PROCEDURE — 1159F MED LIST DOCD IN RCRD: CPT | Mod: CPTII,HCNC,S$GLB, | Performed by: INTERNAL MEDICINE

## 2025-08-14 ENCOUNTER — OUTPATIENT CASE MANAGEMENT (OUTPATIENT)
Dept: ADMINISTRATIVE | Facility: OTHER | Age: 85
End: 2025-08-14
Payer: MEDICARE

## 2025-08-14 LAB
ANION GAP (OHS): 14 MMOL/L (ref 8–16)
BUN SERPL-MCNC: 20 MG/DL (ref 8–23)
CALCIUM SERPL-MCNC: 8.5 MG/DL (ref 8.7–10.5)
CHLORIDE SERPL-SCNC: 107 MMOL/L (ref 95–110)
CO2 SERPL-SCNC: 23 MMOL/L (ref 23–29)
CREAT SERPL-MCNC: 1.7 MG/DL (ref 0.5–1.4)
GFR SERPLBLD CREATININE-BSD FMLA CKD-EPI: 40 ML/MIN/1.73/M2
GLUCOSE SERPL-MCNC: 100 MG/DL (ref 70–110)
POTASSIUM SERPL-SCNC: 4.3 MMOL/L (ref 3.5–5.1)
SODIUM SERPL-SCNC: 144 MMOL/L (ref 136–145)

## 2025-08-19 RX ORDER — ALFUZOSIN HYDROCHLORIDE 10 MG/1
10 TABLET, EXTENDED RELEASE ORAL
Qty: 30 TABLET | Refills: 11 | Status: ON HOLD | OUTPATIENT
Start: 2025-08-19

## 2025-08-21 ENCOUNTER — HOSPITAL ENCOUNTER (OUTPATIENT)
Facility: HOSPITAL | Age: 85
Discharge: HOME | End: 2025-08-25
Attending: EMERGENCY MEDICINE | Admitting: HOSPITALIST
Payer: MEDICARE

## 2025-08-21 DIAGNOSIS — I15.2 HYPERTENSION ASSOCIATED WITH DIABETES: Primary | Chronic | ICD-10-CM

## 2025-08-21 DIAGNOSIS — R79.89 ELEVATED D-DIMER: ICD-10-CM

## 2025-08-21 DIAGNOSIS — E11.59 HYPERTENSION ASSOCIATED WITH DIABETES: Primary | Chronic | ICD-10-CM

## 2025-08-21 DIAGNOSIS — R07.9 CHEST PAIN: ICD-10-CM

## 2025-08-21 DIAGNOSIS — R06.02 SHORTNESS OF BREATH: ICD-10-CM

## 2025-08-21 DIAGNOSIS — I50.32 CHRONIC DIASTOLIC CONGESTIVE HEART FAILURE: ICD-10-CM

## 2025-08-21 LAB
ABSOLUTE EOSINOPHIL (OHS): 0.28 K/UL
ABSOLUTE MONOCYTE (OHS): 0.59 K/UL (ref 0.3–1)
ABSOLUTE NEUTROPHIL COUNT (OHS): 4.24 K/UL (ref 1.8–7.7)
ALBUMIN SERPL BCP-MCNC: 3.8 G/DL (ref 3.5–5.2)
ALP SERPL-CCNC: 64 UNIT/L (ref 40–150)
ALT SERPL W/O P-5'-P-CCNC: 16 UNIT/L (ref 10–44)
ANION GAP (OHS): 11 MMOL/L (ref 8–16)
AST SERPL-CCNC: 27 UNIT/L (ref 11–45)
BASOPHILS # BLD AUTO: 0.02 K/UL
BASOPHILS NFR BLD AUTO: 0.3 %
BILIRUB SERPL-MCNC: 0.4 MG/DL (ref 0.1–1)
BUN SERPL-MCNC: 21 MG/DL (ref 8–23)
CALCIUM SERPL-MCNC: 8.7 MG/DL (ref 8.7–10.5)
CHLORIDE SERPL-SCNC: 105 MMOL/L (ref 95–110)
CO2 SERPL-SCNC: 22 MMOL/L (ref 23–29)
CREAT SERPL-MCNC: 1.8 MG/DL (ref 0.5–1.4)
ERYTHROCYTE [DISTWIDTH] IN BLOOD BY AUTOMATED COUNT: 14 % (ref 11.5–14.5)
GFR SERPLBLD CREATININE-BSD FMLA CKD-EPI: 37 ML/MIN/1.73/M2
GLUCOSE SERPL-MCNC: 120 MG/DL (ref 70–110)
HCT VFR BLD AUTO: 35.2 % (ref 40–54)
HGB BLD-MCNC: 12.2 GM/DL (ref 14–18)
IMM GRANULOCYTES # BLD AUTO: 0.02 K/UL (ref 0–0.04)
IMM GRANULOCYTES NFR BLD AUTO: 0.3 % (ref 0–0.5)
LYMPHOCYTES # BLD AUTO: 0.59 K/UL (ref 1–4.8)
MCH RBC QN AUTO: 29.3 PG (ref 27–31)
MCHC RBC AUTO-ENTMCNC: 34.7 G/DL (ref 32–36)
MCV RBC AUTO: 84 FL (ref 82–98)
NT-PROBNP SERPL-MCNC: 320 PG/ML
NUCLEATED RBC (/100WBC) (OHS): 0 /100 WBC
PLATELET # BLD AUTO: 183 K/UL (ref 150–450)
PMV BLD AUTO: 10.1 FL (ref 9.2–12.9)
POTASSIUM SERPL-SCNC: 4 MMOL/L (ref 3.5–5.1)
PROT SERPL-MCNC: 6.8 GM/DL (ref 6–8.4)
RBC # BLD AUTO: 4.17 M/UL (ref 4.6–6.2)
RELATIVE EOSINOPHIL (OHS): 4.9 %
RELATIVE LYMPHOCYTE (OHS): 10.3 % (ref 18–48)
RELATIVE MONOCYTE (OHS): 10.3 % (ref 4–15)
RELATIVE NEUTROPHIL (OHS): 73.9 % (ref 38–73)
SODIUM SERPL-SCNC: 138 MMOL/L (ref 136–145)
TROPONIN I SERPL HS-MCNC: <3 NG/L
WBC # BLD AUTO: 5.74 K/UL (ref 3.9–12.7)

## 2025-08-21 PROCEDURE — 83880 ASSAY OF NATRIURETIC PEPTIDE: CPT | Mod: HCNC | Performed by: NURSE PRACTITIONER

## 2025-08-21 PROCEDURE — 80053 COMPREHEN METABOLIC PANEL: CPT | Mod: HCNC | Performed by: NURSE PRACTITIONER

## 2025-08-21 PROCEDURE — 99285 EMERGENCY DEPT VISIT HI MDM: CPT | Mod: 25,HCNC

## 2025-08-21 PROCEDURE — 85025 COMPLETE CBC W/AUTO DIFF WBC: CPT | Mod: HCNC | Performed by: NURSE PRACTITIONER

## 2025-08-21 PROCEDURE — 93010 ELECTROCARDIOGRAM REPORT: CPT | Mod: XE,HCNC,, | Performed by: INTERNAL MEDICINE

## 2025-08-21 PROCEDURE — 84484 ASSAY OF TROPONIN QUANT: CPT | Mod: HCNC | Performed by: NURSE PRACTITIONER

## 2025-08-21 PROCEDURE — 82962 GLUCOSE BLOOD TEST: CPT | Mod: HCNC

## 2025-08-21 PROCEDURE — 93005 ELECTROCARDIOGRAM TRACING: CPT | Mod: HCNC

## 2025-08-22 PROBLEM — E78.5 HLD (HYPERLIPIDEMIA): Status: ACTIVE | Noted: 2025-08-22

## 2025-08-22 PROBLEM — I25.10 CAD (CORONARY ARTERY DISEASE): Status: ACTIVE | Noted: 2025-08-22

## 2025-08-22 PROBLEM — N18.30 DIABETES MELLITUS WITH STAGE 3 CHRONIC KIDNEY DISEASE: Chronic | Status: ACTIVE | Noted: 2020-11-18

## 2025-08-22 PROBLEM — I50.32 CHRONIC DIASTOLIC CONGESTIVE HEART FAILURE: Status: ACTIVE | Noted: 2025-08-22

## 2025-08-22 PROBLEM — D64.9 ANEMIA: Status: ACTIVE | Noted: 2025-08-22

## 2025-08-22 PROBLEM — R06.02 SHORTNESS OF BREATH: Status: ACTIVE | Noted: 2025-08-22

## 2025-08-22 PROBLEM — N40.0 BENIGN PROSTATIC HYPERPLASIA: Chronic | Status: ACTIVE | Noted: 2019-11-01

## 2025-08-22 PROBLEM — M10.9 GOUT: Chronic | Status: ACTIVE | Noted: 2018-09-14

## 2025-08-22 PROBLEM — N18.32 STAGE 3B CHRONIC KIDNEY DISEASE: Chronic | Status: ACTIVE | Noted: 2025-01-24

## 2025-08-22 PROBLEM — K59.00 CONSTIPATION: Status: ACTIVE | Noted: 2025-08-22

## 2025-08-22 PROBLEM — D64.9 ANEMIA: Chronic | Status: ACTIVE | Noted: 2025-08-22

## 2025-08-22 PROBLEM — E11.22 DIABETES MELLITUS WITH STAGE 3 CHRONIC KIDNEY DISEASE: Chronic | Status: ACTIVE | Noted: 2020-11-18

## 2025-08-22 PROBLEM — R79.89 ELEVATED D-DIMER: Status: ACTIVE | Noted: 2025-08-22

## 2025-08-22 PROBLEM — E78.5 HLD (HYPERLIPIDEMIA): Chronic | Status: ACTIVE | Noted: 2025-08-22

## 2025-08-22 LAB
ABSOLUTE EOSINOPHIL (OHS): 0.34 K/UL
ABSOLUTE MONOCYTE (OHS): 0.42 K/UL (ref 0.3–1)
ABSOLUTE NEUTROPHIL COUNT (OHS): 2.53 K/UL (ref 1.8–7.7)
ALBUMIN SERPL BCP-MCNC: 3.5 G/DL (ref 3.5–5.2)
ALP SERPL-CCNC: 60 UNIT/L (ref 40–150)
ALT SERPL W/O P-5'-P-CCNC: 15 UNIT/L (ref 10–44)
ANION GAP (OHS): 8 MMOL/L (ref 8–16)
AST SERPL-CCNC: 20 UNIT/L (ref 11–45)
BASOPHILS # BLD AUTO: 0.02 K/UL
BASOPHILS NFR BLD AUTO: 0.5 %
BILIRUB SERPL-MCNC: 0.4 MG/DL (ref 0.1–1)
BUN SERPL-MCNC: 21 MG/DL (ref 8–23)
CALCIUM SERPL-MCNC: 8.5 MG/DL (ref 8.7–10.5)
CHLORIDE SERPL-SCNC: 107 MMOL/L (ref 95–110)
CO2 SERPL-SCNC: 24 MMOL/L (ref 23–29)
CREAT SERPL-MCNC: 1.8 MG/DL (ref 0.5–1.4)
D DIMER PPP IA.FEU-MCNC: 0.96 MG/L FEU
ERYTHROCYTE [DISTWIDTH] IN BLOOD BY AUTOMATED COUNT: 13.9 % (ref 11.5–14.5)
GFR SERPLBLD CREATININE-BSD FMLA CKD-EPI: 37 ML/MIN/1.73/M2
GLUCOSE SERPL-MCNC: 127 MG/DL (ref 70–110)
HCT VFR BLD AUTO: 34 % (ref 40–54)
HGB BLD-MCNC: 11.4 GM/DL (ref 14–18)
IMM GRANULOCYTES # BLD AUTO: 0.02 K/UL (ref 0–0.04)
IMM GRANULOCYTES NFR BLD AUTO: 0.5 % (ref 0–0.5)
LYMPHOCYTES # BLD AUTO: 0.67 K/UL (ref 1–4.8)
MCH RBC QN AUTO: 28.7 PG (ref 27–31)
MCHC RBC AUTO-ENTMCNC: 33.5 G/DL (ref 32–36)
MCV RBC AUTO: 86 FL (ref 82–98)
NUCLEATED RBC (/100WBC) (OHS): 0 /100 WBC
PLATELET # BLD AUTO: 161 K/UL (ref 150–450)
PMV BLD AUTO: 10.3 FL (ref 9.2–12.9)
POCT GLUCOSE: 121 MG/DL (ref 70–110)
POCT GLUCOSE: 133 MG/DL (ref 70–110)
POCT GLUCOSE: 138 MG/DL (ref 70–110)
POCT GLUCOSE: 142 MG/DL (ref 70–110)
POTASSIUM SERPL-SCNC: 3.6 MMOL/L (ref 3.5–5.1)
PROT SERPL-MCNC: 6.3 GM/DL (ref 6–8.4)
RBC # BLD AUTO: 3.97 M/UL (ref 4.6–6.2)
RELATIVE EOSINOPHIL (OHS): 8.5 %
RELATIVE LYMPHOCYTE (OHS): 16.8 % (ref 18–48)
RELATIVE MONOCYTE (OHS): 10.5 % (ref 4–15)
RELATIVE NEUTROPHIL (OHS): 63.2 % (ref 38–73)
SODIUM SERPL-SCNC: 139 MMOL/L (ref 136–145)
WBC # BLD AUTO: 4 K/UL (ref 3.9–12.7)

## 2025-08-22 PROCEDURE — 25000003 PHARM REV CODE 250: Mod: HCNC | Performed by: NURSE PRACTITIONER

## 2025-08-22 PROCEDURE — 96372 THER/PROPH/DIAG INJ SC/IM: CPT | Performed by: HOSPITALIST

## 2025-08-22 PROCEDURE — A9540 TC99M MAA: HCPCS | Mod: HCNC | Performed by: INTERNAL MEDICINE

## 2025-08-22 PROCEDURE — 99214 OFFICE O/P EST MOD 30 MIN: CPT | Mod: HCNC,,, | Performed by: INTERNAL MEDICINE

## 2025-08-22 PROCEDURE — 85025 COMPLETE CBC W/AUTO DIFF WBC: CPT | Mod: HCNC | Performed by: HOSPITALIST

## 2025-08-22 PROCEDURE — 25000003 PHARM REV CODE 250: Mod: HCNC | Performed by: INTERNAL MEDICINE

## 2025-08-22 PROCEDURE — G0378 HOSPITAL OBSERVATION PER HR: HCPCS | Mod: HCNC

## 2025-08-22 PROCEDURE — 99900035 HC TECH TIME PER 15 MIN (STAT): Mod: HCNC

## 2025-08-22 PROCEDURE — 63600175 PHARM REV CODE 636 W HCPCS: Mod: HCNC | Performed by: HOSPITALIST

## 2025-08-22 PROCEDURE — A9567 TECHNETIUM TC-99M AEROSOL: HCPCS | Mod: HCNC | Performed by: INTERNAL MEDICINE

## 2025-08-22 PROCEDURE — 84075 ASSAY ALKALINE PHOSPHATASE: CPT | Mod: HCNC | Performed by: HOSPITALIST

## 2025-08-22 PROCEDURE — 85379 FIBRIN DEGRADATION QUANT: CPT | Mod: HCNC | Performed by: EMERGENCY MEDICINE

## 2025-08-22 PROCEDURE — 94761 N-INVAS EAR/PLS OXIMETRY MLT: CPT | Mod: HCNC

## 2025-08-22 PROCEDURE — 25000003 PHARM REV CODE 250: Mod: HCNC | Performed by: HOSPITALIST

## 2025-08-22 RX ORDER — IBUPROFEN 200 MG
24 TABLET ORAL
Status: DISCONTINUED | OUTPATIENT
Start: 2025-08-22 | End: 2025-08-25 | Stop reason: HOSPADM

## 2025-08-22 RX ORDER — NALOXONE HCL 0.4 MG/ML
0.02 VIAL (ML) INJECTION
Status: DISCONTINUED | OUTPATIENT
Start: 2025-08-22 | End: 2025-08-25 | Stop reason: HOSPADM

## 2025-08-22 RX ORDER — PROMETHAZINE HYDROCHLORIDE 25 MG/1
25 TABLET ORAL EVERY 6 HOURS PRN
Status: DISCONTINUED | OUTPATIENT
Start: 2025-08-22 | End: 2025-08-25 | Stop reason: HOSPADM

## 2025-08-22 RX ORDER — SODIUM CHLORIDE 0.9 % (FLUSH) 0.9 %
10 SYRINGE (ML) INJECTION EVERY 12 HOURS PRN
Status: DISCONTINUED | OUTPATIENT
Start: 2025-08-22 | End: 2025-08-25 | Stop reason: HOSPADM

## 2025-08-22 RX ORDER — ONDANSETRON HYDROCHLORIDE 2 MG/ML
4 INJECTION, SOLUTION INTRAVENOUS EVERY 8 HOURS PRN
Status: DISCONTINUED | OUTPATIENT
Start: 2025-08-22 | End: 2025-08-25 | Stop reason: HOSPADM

## 2025-08-22 RX ORDER — GLUCAGON 1 MG
1 KIT INJECTION
Status: DISCONTINUED | OUTPATIENT
Start: 2025-08-22 | End: 2025-08-25 | Stop reason: HOSPADM

## 2025-08-22 RX ORDER — IBUPROFEN 200 MG
16 TABLET ORAL
Status: DISCONTINUED | OUTPATIENT
Start: 2025-08-22 | End: 2025-08-25 | Stop reason: HOSPADM

## 2025-08-22 RX ORDER — HYDROCODONE BITARTRATE AND ACETAMINOPHEN 5; 325 MG/1; MG/1
1 TABLET ORAL EVERY 6 HOURS PRN
Refills: 0 | Status: DISCONTINUED | OUTPATIENT
Start: 2025-08-22 | End: 2025-08-25 | Stop reason: HOSPADM

## 2025-08-22 RX ORDER — BISACODYL 5 MG
10 TABLET, DELAYED RELEASE (ENTERIC COATED) ORAL ONCE
Status: COMPLETED | OUTPATIENT
Start: 2025-08-22 | End: 2025-08-22

## 2025-08-22 RX ORDER — MORPHINE SULFATE 4 MG/ML
2 INJECTION, SOLUTION INTRAMUSCULAR; INTRAVENOUS EVERY 4 HOURS PRN
Refills: 0 | Status: DISCONTINUED | OUTPATIENT
Start: 2025-08-22 | End: 2025-08-25 | Stop reason: HOSPADM

## 2025-08-22 RX ORDER — ENOXAPARIN SODIUM 100 MG/ML
40 INJECTION SUBCUTANEOUS EVERY 24 HOURS
Status: DISCONTINUED | OUTPATIENT
Start: 2025-08-22 | End: 2025-08-25 | Stop reason: HOSPADM

## 2025-08-22 RX ORDER — ACETAMINOPHEN 650 MG/1
650 SUPPOSITORY RECTAL EVERY 6 HOURS PRN
Status: DISCONTINUED | OUTPATIENT
Start: 2025-08-22 | End: 2025-08-25 | Stop reason: HOSPADM

## 2025-08-22 RX ORDER — POLYETHYLENE GLYCOL 3350 17 G/17G
17 POWDER, FOR SOLUTION ORAL DAILY PRN
Status: DISCONTINUED | OUTPATIENT
Start: 2025-08-22 | End: 2025-08-25 | Stop reason: HOSPADM

## 2025-08-22 RX ORDER — IPRATROPIUM BROMIDE AND ALBUTEROL SULFATE 2.5; .5 MG/3ML; MG/3ML
3 SOLUTION RESPIRATORY (INHALATION) EVERY 6 HOURS PRN
Status: DISCONTINUED | OUTPATIENT
Start: 2025-08-22 | End: 2025-08-25 | Stop reason: HOSPADM

## 2025-08-22 RX ORDER — INSULIN ASPART 100 [IU]/ML
0-5 INJECTION, SOLUTION INTRAVENOUS; SUBCUTANEOUS
Status: DISCONTINUED | OUTPATIENT
Start: 2025-08-22 | End: 2025-08-25 | Stop reason: HOSPADM

## 2025-08-22 RX ORDER — TALC
6 POWDER (GRAM) TOPICAL NIGHTLY PRN
Status: DISCONTINUED | OUTPATIENT
Start: 2025-08-22 | End: 2025-08-25 | Stop reason: HOSPADM

## 2025-08-22 RX ORDER — ALUMINUM HYDROXIDE, MAGNESIUM HYDROXIDE, AND SIMETHICONE 1200; 120; 1200 MG/30ML; MG/30ML; MG/30ML
30 SUSPENSION ORAL 4 TIMES DAILY PRN
Status: DISCONTINUED | OUTPATIENT
Start: 2025-08-22 | End: 2025-08-25 | Stop reason: HOSPADM

## 2025-08-22 RX ORDER — AMOXICILLIN 250 MG
1 CAPSULE ORAL 2 TIMES DAILY PRN
Status: DISCONTINUED | OUTPATIENT
Start: 2025-08-22 | End: 2025-08-25 | Stop reason: HOSPADM

## 2025-08-22 RX ORDER — ACETAMINOPHEN 325 MG/1
650 TABLET ORAL EVERY 8 HOURS PRN
Status: DISCONTINUED | OUTPATIENT
Start: 2025-08-22 | End: 2025-08-24 | Stop reason: SDUPTHER

## 2025-08-22 RX ADMIN — SENNOSIDES, DOCUSATE SODIUM 1 TABLET: 50; 8.6 TABLET, FILM COATED ORAL at 05:08

## 2025-08-22 RX ADMIN — KIT FOR THE PREPARATION OF TECHNETIUM TC 99M PENTETATE 32 MILLICURIE: 20 INJECTION, POWDER, LYOPHILIZED, FOR SOLUTION INTRAVENOUS; RESPIRATORY (INHALATION) at 09:08

## 2025-08-22 RX ADMIN — POLYETHYLENE GLYCOL 3350 17 G: 17 POWDER, FOR SOLUTION ORAL at 08:08

## 2025-08-22 RX ADMIN — ENOXAPARIN SODIUM 40 MG: 40 INJECTION SUBCUTANEOUS at 05:08

## 2025-08-22 RX ADMIN — KIT FOR THE PREPARATION OF TECHNETIUM TC 99M ALBUMIN AGGREGATED 5 MILLICURIE: 2.5 INJECTION, POWDER, FOR SOLUTION INTRAVENOUS at 10:08

## 2025-08-22 RX ADMIN — BISACODYL 10 MG: 5 TABLET, COATED ORAL at 01:08

## 2025-08-23 LAB
ABSOLUTE EOSINOPHIL (OHS): 0.41 K/UL
ABSOLUTE MONOCYTE (OHS): 0.62 K/UL (ref 0.3–1)
ABSOLUTE NEUTROPHIL COUNT (OHS): 1.21 K/UL (ref 1.8–7.7)
ALBUMIN SERPL BCP-MCNC: 3.2 G/DL (ref 3.5–5.2)
ALP SERPL-CCNC: 59 UNIT/L (ref 40–150)
ALT SERPL W/O P-5'-P-CCNC: 25 UNIT/L (ref 10–44)
ANION GAP (OHS): 8 MMOL/L (ref 8–16)
AST SERPL-CCNC: 37 UNIT/L (ref 11–45)
BASOPHILS # BLD AUTO: 0.02 K/UL
BASOPHILS NFR BLD AUTO: 0.6 %
BILIRUB SERPL-MCNC: 0.3 MG/DL (ref 0.1–1)
BUN SERPL-MCNC: 21 MG/DL (ref 8–23)
CALCIUM SERPL-MCNC: 8.7 MG/DL (ref 8.7–10.5)
CHLORIDE SERPL-SCNC: 110 MMOL/L (ref 95–110)
CO2 SERPL-SCNC: 25 MMOL/L (ref 23–29)
CREAT SERPL-MCNC: 1.7 MG/DL (ref 0.5–1.4)
ERYTHROCYTE [DISTWIDTH] IN BLOOD BY AUTOMATED COUNT: 13.8 % (ref 11.5–14.5)
GFR SERPLBLD CREATININE-BSD FMLA CKD-EPI: 40 ML/MIN/1.73/M2
GLUCOSE SERPL-MCNC: 115 MG/DL (ref 70–110)
HCT VFR BLD AUTO: 33 % (ref 40–54)
HGB BLD-MCNC: 11.5 GM/DL (ref 14–18)
IMM GRANULOCYTES # BLD AUTO: 0 K/UL (ref 0–0.04)
IMM GRANULOCYTES NFR BLD AUTO: 0 % (ref 0–0.5)
LYMPHOCYTES # BLD AUTO: 1.17 K/UL (ref 1–4.8)
MCH RBC QN AUTO: 29 PG (ref 27–31)
MCHC RBC AUTO-ENTMCNC: 34.8 G/DL (ref 32–36)
MCV RBC AUTO: 83 FL (ref 82–98)
NUCLEATED RBC (/100WBC) (OHS): 0 /100 WBC
OHS QRS DURATION: 94 MS
OHS QTC CALCULATION: 438 MS
PLATELET # BLD AUTO: 166 K/UL (ref 150–450)
PMV BLD AUTO: 9.9 FL (ref 9.2–12.9)
POCT GLUCOSE: 112 MG/DL (ref 70–110)
POCT GLUCOSE: 131 MG/DL (ref 70–110)
POCT GLUCOSE: 137 MG/DL (ref 70–110)
POTASSIUM SERPL-SCNC: 3.8 MMOL/L (ref 3.5–5.1)
PROT SERPL-MCNC: 6.1 GM/DL (ref 6–8.4)
RBC # BLD AUTO: 3.96 M/UL (ref 4.6–6.2)
RELATIVE EOSINOPHIL (OHS): 12 %
RELATIVE LYMPHOCYTE (OHS): 34.1 % (ref 18–48)
RELATIVE MONOCYTE (OHS): 18.1 % (ref 4–15)
RELATIVE NEUTROPHIL (OHS): 35.2 % (ref 38–73)
SODIUM SERPL-SCNC: 143 MMOL/L (ref 136–145)
WBC # BLD AUTO: 3.43 K/UL (ref 3.9–12.7)

## 2025-08-23 PROCEDURE — 99900035 HC TECH TIME PER 15 MIN (STAT): Mod: HCNC

## 2025-08-23 PROCEDURE — G0378 HOSPITAL OBSERVATION PER HR: HCPCS | Mod: HCNC

## 2025-08-23 PROCEDURE — 25000003 PHARM REV CODE 250: Mod: HCNC | Performed by: HOSPITALIST

## 2025-08-23 PROCEDURE — 96372 THER/PROPH/DIAG INJ SC/IM: CPT | Performed by: HOSPITALIST

## 2025-08-23 PROCEDURE — 63600175 PHARM REV CODE 636 W HCPCS: Mod: HCNC | Performed by: HOSPITALIST

## 2025-08-23 PROCEDURE — 85025 COMPLETE CBC W/AUTO DIFF WBC: CPT | Mod: HCNC | Performed by: HOSPITALIST

## 2025-08-23 PROCEDURE — 80053 COMPREHEN METABOLIC PANEL: CPT | Mod: HCNC | Performed by: HOSPITALIST

## 2025-08-23 PROCEDURE — 36415 COLL VENOUS BLD VENIPUNCTURE: CPT | Mod: HCNC | Performed by: HOSPITALIST

## 2025-08-23 PROCEDURE — 25000003 PHARM REV CODE 250: Mod: HCNC | Performed by: INTERNAL MEDICINE

## 2025-08-23 PROCEDURE — 99214 OFFICE O/P EST MOD 30 MIN: CPT | Mod: HCNC,,, | Performed by: INTERNAL MEDICINE

## 2025-08-23 RX ORDER — ALLOPURINOL 100 MG/1
100 TABLET ORAL DAILY
Status: DISCONTINUED | OUTPATIENT
Start: 2025-08-23 | End: 2025-08-25 | Stop reason: HOSPADM

## 2025-08-23 RX ORDER — SODIUM CHLORIDE 9 MG/ML
INJECTION, SOLUTION INTRAVENOUS CONTINUOUS
Status: DISCONTINUED | OUTPATIENT
Start: 2025-08-24 | End: 2025-08-24

## 2025-08-23 RX ORDER — SYRING-NEEDL,DISP,INSUL,0.3 ML 29 G X1/2"
296 SYRINGE, EMPTY DISPOSABLE MISCELLANEOUS ONCE
Status: COMPLETED | OUTPATIENT
Start: 2025-08-23 | End: 2025-08-23

## 2025-08-23 RX ORDER — PRAVASTATIN SODIUM 20 MG/1
40 TABLET ORAL NIGHTLY
Status: DISCONTINUED | OUTPATIENT
Start: 2025-08-23 | End: 2025-08-25 | Stop reason: HOSPADM

## 2025-08-23 RX ORDER — NAPROXEN SODIUM 220 MG/1
81 TABLET, FILM COATED ORAL DAILY
Status: DISCONTINUED | OUTPATIENT
Start: 2025-08-23 | End: 2025-08-25 | Stop reason: HOSPADM

## 2025-08-23 RX ADMIN — PRAVASTATIN SODIUM 40 MG: 20 TABLET ORAL at 08:08

## 2025-08-23 RX ADMIN — ALLOPURINOL 100 MG: 100 TABLET ORAL at 02:08

## 2025-08-23 RX ADMIN — ACETAMINOPHEN 650 MG: 325 TABLET ORAL at 08:08

## 2025-08-23 RX ADMIN — MAGNESIUM CITRATE 296 ML: 1.75 LIQUID ORAL at 12:08

## 2025-08-23 RX ADMIN — ENOXAPARIN SODIUM 40 MG: 40 INJECTION SUBCUTANEOUS at 05:08

## 2025-08-23 RX ADMIN — ASPIRIN 81 MG CHEWABLE TABLET 81 MG: 81 TABLET CHEWABLE at 02:08

## 2025-08-24 LAB
ABSOLUTE EOSINOPHIL (OHS): 0.36 K/UL
ABSOLUTE MONOCYTE (OHS): 0.52 K/UL (ref 0.3–1)
ABSOLUTE NEUTROPHIL COUNT (OHS): 1.34 K/UL (ref 1.8–7.7)
ALBUMIN SERPL BCP-MCNC: 3.3 G/DL (ref 3.5–5.2)
ALP SERPL-CCNC: 61 UNIT/L (ref 40–150)
ALT SERPL W/O P-5'-P-CCNC: 38 UNIT/L (ref 10–44)
ANION GAP (OHS): 7 MMOL/L (ref 8–16)
AST SERPL-CCNC: 41 UNIT/L (ref 11–45)
BASOPHILS # BLD AUTO: 0.01 K/UL
BASOPHILS NFR BLD AUTO: 0.3 %
BILIRUB SERPL-MCNC: 0.3 MG/DL (ref 0.1–1)
BUN SERPL-MCNC: 26 MG/DL (ref 8–23)
CALCIUM SERPL-MCNC: 8.7 MG/DL (ref 8.7–10.5)
CHLORIDE SERPL-SCNC: 107 MMOL/L (ref 95–110)
CO2 SERPL-SCNC: 27 MMOL/L (ref 23–29)
CREAT SERPL-MCNC: 1.7 MG/DL (ref 0.5–1.4)
ERYTHROCYTE [DISTWIDTH] IN BLOOD BY AUTOMATED COUNT: 13.9 % (ref 11.5–14.5)
GFR SERPLBLD CREATININE-BSD FMLA CKD-EPI: 40 ML/MIN/1.73/M2
GLUCOSE SERPL-MCNC: 122 MG/DL (ref 70–110)
HCT VFR BLD AUTO: 32 % (ref 40–54)
HGB BLD-MCNC: 11 GM/DL (ref 14–18)
IMM GRANULOCYTES # BLD AUTO: 0.01 K/UL (ref 0–0.04)
IMM GRANULOCYTES NFR BLD AUTO: 0.3 % (ref 0–0.5)
LYMPHOCYTES # BLD AUTO: 1.17 K/UL (ref 1–4.8)
MCH RBC QN AUTO: 29 PG (ref 27–31)
MCHC RBC AUTO-ENTMCNC: 34.4 G/DL (ref 32–36)
MCV RBC AUTO: 84 FL (ref 82–98)
NUCLEATED RBC (/100WBC) (OHS): 0 /100 WBC
OHS CV CPX PATIENT HEIGHT IN: 75
PLATELET # BLD AUTO: 175 K/UL (ref 150–450)
PMV BLD AUTO: 10.1 FL (ref 9.2–12.9)
POCT GLUCOSE: 105 MG/DL (ref 70–110)
POCT GLUCOSE: 122 MG/DL (ref 70–110)
POCT GLUCOSE: 141 MG/DL (ref 70–110)
POCT GLUCOSE: 90 MG/DL (ref 70–110)
POTASSIUM SERPL-SCNC: 4.2 MMOL/L (ref 3.5–5.1)
PROT SERPL-MCNC: 6.2 GM/DL (ref 6–8.4)
RBC # BLD AUTO: 3.79 M/UL (ref 4.6–6.2)
RELATIVE EOSINOPHIL (OHS): 10.6 %
RELATIVE LYMPHOCYTE (OHS): 34.3 % (ref 18–48)
RELATIVE MONOCYTE (OHS): 15.2 % (ref 4–15)
RELATIVE NEUTROPHIL (OHS): 39.3 % (ref 38–73)
SODIUM SERPL-SCNC: 141 MMOL/L (ref 136–145)
WBC # BLD AUTO: 3.41 K/UL (ref 3.9–12.7)

## 2025-08-24 PROCEDURE — 25000003 PHARM REV CODE 250: Mod: HCNC | Performed by: INTERNAL MEDICINE

## 2025-08-24 PROCEDURE — 63600175 PHARM REV CODE 636 W HCPCS: Mod: HCNC | Performed by: INTERNAL MEDICINE

## 2025-08-24 PROCEDURE — 27201423 OPTIME MED/SURG SUP & DEVICES STERILE SUPPLY: Mod: HCNC | Performed by: INTERNAL MEDICINE

## 2025-08-24 PROCEDURE — 99900035 HC TECH TIME PER 15 MIN (STAT): Mod: HCNC

## 2025-08-24 PROCEDURE — 80053 COMPREHEN METABOLIC PANEL: CPT | Mod: HCNC | Performed by: HOSPITALIST

## 2025-08-24 PROCEDURE — 96372 THER/PROPH/DIAG INJ SC/IM: CPT | Performed by: INTERNAL MEDICINE

## 2025-08-24 PROCEDURE — 25500020 PHARM REV CODE 255: Mod: HCNC | Performed by: INTERNAL MEDICINE

## 2025-08-24 PROCEDURE — 99214 OFFICE O/P EST MOD 30 MIN: CPT | Mod: 25,HCNC,, | Performed by: INTERNAL MEDICINE

## 2025-08-24 PROCEDURE — C1751 CATH, INF, PER/CENT/MIDLINE: HCPCS | Mod: HCNC | Performed by: INTERNAL MEDICINE

## 2025-08-24 PROCEDURE — 99152 MOD SED SAME PHYS/QHP 5/>YRS: CPT | Mod: HCNC,,, | Performed by: INTERNAL MEDICINE

## 2025-08-24 PROCEDURE — 94799 UNLISTED PULMONARY SVC/PX: CPT | Mod: HCNC

## 2025-08-24 PROCEDURE — 93460 R&L HRT ART/VENTRICLE ANGIO: CPT | Mod: 26,HCNC,, | Performed by: INTERNAL MEDICINE

## 2025-08-24 PROCEDURE — 93460 R&L HRT ART/VENTRICLE ANGIO: CPT | Mod: HCNC | Performed by: INTERNAL MEDICINE

## 2025-08-24 PROCEDURE — 85025 COMPLETE CBC W/AUTO DIFF WBC: CPT | Mod: HCNC | Performed by: HOSPITALIST

## 2025-08-24 PROCEDURE — C1769 GUIDE WIRE: HCPCS | Mod: HCNC | Performed by: INTERNAL MEDICINE

## 2025-08-24 PROCEDURE — 96360 HYDRATION IV INFUSION INIT: CPT

## 2025-08-24 PROCEDURE — C1760 CLOSURE DEV, VASC: HCPCS | Mod: HCNC | Performed by: INTERNAL MEDICINE

## 2025-08-24 PROCEDURE — 96361 HYDRATE IV INFUSION ADD-ON: CPT

## 2025-08-24 PROCEDURE — C1894 INTRO/SHEATH, NON-LASER: HCPCS | Mod: HCNC | Performed by: INTERNAL MEDICINE

## 2025-08-24 PROCEDURE — G0378 HOSPITAL OBSERVATION PER HR: HCPCS | Mod: HCNC

## 2025-08-24 PROCEDURE — 36415 COLL VENOUS BLD VENIPUNCTURE: CPT | Mod: HCNC | Performed by: HOSPITALIST

## 2025-08-24 DEVICE — KIT ANGIO SEAL 6FR VIP: Type: IMPLANTABLE DEVICE | Site: GROIN | Status: FUNCTIONAL

## 2025-08-24 RX ORDER — OXYBUTYNIN CHLORIDE 5 MG/1
10 TABLET, EXTENDED RELEASE ORAL DAILY
Status: DISCONTINUED | OUTPATIENT
Start: 2025-08-25 | End: 2025-08-25 | Stop reason: HOSPADM

## 2025-08-24 RX ORDER — AMLODIPINE BESYLATE 2.5 MG/1
2.5 TABLET ORAL DAILY
Status: DISCONTINUED | OUTPATIENT
Start: 2025-08-25 | End: 2025-08-25 | Stop reason: HOSPADM

## 2025-08-24 RX ORDER — MIDAZOLAM HYDROCHLORIDE 1 MG/ML
INJECTION, SOLUTION INTRAMUSCULAR; INTRAVENOUS
Status: DISCONTINUED | OUTPATIENT
Start: 2025-08-24 | End: 2025-08-24

## 2025-08-24 RX ORDER — ONDANSETRON 8 MG/1
8 TABLET, ORALLY DISINTEGRATING ORAL EVERY 8 HOURS PRN
Status: DISCONTINUED | OUTPATIENT
Start: 2025-08-24 | End: 2025-08-25 | Stop reason: HOSPADM

## 2025-08-24 RX ORDER — ACETAMINOPHEN 325 MG/1
650 TABLET ORAL EVERY 4 HOURS PRN
Status: DISCONTINUED | OUTPATIENT
Start: 2025-08-24 | End: 2025-08-25 | Stop reason: HOSPADM

## 2025-08-24 RX ORDER — FUROSEMIDE 10 MG/ML
40 INJECTION INTRAMUSCULAR; INTRAVENOUS ONCE
Status: DISCONTINUED | OUTPATIENT
Start: 2025-08-24 | End: 2025-08-24

## 2025-08-24 RX ORDER — LOSARTAN POTASSIUM 25 MG/1
25 TABLET ORAL DAILY
Status: DISCONTINUED | OUTPATIENT
Start: 2025-08-25 | End: 2025-08-25 | Stop reason: HOSPADM

## 2025-08-24 RX ORDER — CEFAZOLIN SODIUM 1 G/3ML
INJECTION, POWDER, FOR SOLUTION INTRAMUSCULAR; INTRAVENOUS
Status: DISCONTINUED | OUTPATIENT
Start: 2025-08-24 | End: 2025-08-24

## 2025-08-24 RX ORDER — LIDOCAINE HYDROCHLORIDE 20 MG/ML
INJECTION, SOLUTION INFILTRATION; PERINEURAL
Status: DISCONTINUED | OUTPATIENT
Start: 2025-08-24 | End: 2025-08-24

## 2025-08-24 RX ORDER — DIPHENHYDRAMINE HYDROCHLORIDE 50 MG/ML
INJECTION, SOLUTION INTRAMUSCULAR; INTRAVENOUS
Status: DISCONTINUED | OUTPATIENT
Start: 2025-08-24 | End: 2025-08-24

## 2025-08-24 RX ORDER — LABETALOL HYDROCHLORIDE 5 MG/ML
INJECTION, SOLUTION INTRAVENOUS
Status: DISCONTINUED | OUTPATIENT
Start: 2025-08-24 | End: 2025-08-24

## 2025-08-24 RX ORDER — FENTANYL CITRATE 50 UG/ML
INJECTION, SOLUTION INTRAMUSCULAR; INTRAVENOUS
Status: DISCONTINUED | OUTPATIENT
Start: 2025-08-24 | End: 2025-08-24

## 2025-08-24 RX ORDER — HYDRALAZINE HYDROCHLORIDE 20 MG/ML
INJECTION INTRAMUSCULAR; INTRAVENOUS
Status: DISCONTINUED | OUTPATIENT
Start: 2025-08-24 | End: 2025-08-24

## 2025-08-24 RX ORDER — SODIUM CHLORIDE 9 MG/ML
INJECTION, SOLUTION INTRAVENOUS CONTINUOUS
Status: ACTIVE | OUTPATIENT
Start: 2025-08-24 | End: 2025-08-25

## 2025-08-24 RX ADMIN — ASPIRIN 81 MG CHEWABLE TABLET 81 MG: 81 TABLET CHEWABLE at 09:08

## 2025-08-24 RX ADMIN — SODIUM CHLORIDE: 9 INJECTION, SOLUTION INTRAVENOUS at 04:08

## 2025-08-24 RX ADMIN — ALLOPURINOL 100 MG: 100 TABLET ORAL at 10:08

## 2025-08-24 RX ADMIN — ACETAMINOPHEN 650 MG: 325 TABLET ORAL at 04:08

## 2025-08-24 RX ADMIN — LINACLOTIDE 72 MCG: 72 CAPSULE, GELATIN COATED ORAL at 04:08

## 2025-08-24 RX ADMIN — ENOXAPARIN SODIUM 40 MG: 40 INJECTION SUBCUTANEOUS at 04:08

## 2025-08-24 RX ADMIN — SODIUM CHLORIDE: 9 INJECTION, SOLUTION INTRAVENOUS at 06:08

## 2025-08-24 RX ADMIN — PRAVASTATIN SODIUM 40 MG: 20 TABLET ORAL at 09:08

## 2025-08-25 VITALS
WEIGHT: 210 LBS | RESPIRATION RATE: 20 BRPM | SYSTOLIC BLOOD PRESSURE: 158 MMHG | HEART RATE: 81 BPM | HEIGHT: 75 IN | OXYGEN SATURATION: 99 % | TEMPERATURE: 97 F | BODY MASS INDEX: 26.11 KG/M2 | DIASTOLIC BLOOD PRESSURE: 79 MMHG

## 2025-08-25 LAB
ABSOLUTE EOSINOPHIL (OHS): 0.28 K/UL
ABSOLUTE MONOCYTE (OHS): 0.59 K/UL (ref 0.3–1)
ABSOLUTE NEUTROPHIL COUNT (OHS): 2.08 K/UL (ref 1.8–7.7)
ANION GAP (OHS): 9 MMOL/L (ref 8–16)
BASOPHILS # BLD AUTO: 0.03 K/UL
BASOPHILS NFR BLD AUTO: 0.7 %
BUN SERPL-MCNC: 20 MG/DL (ref 8–23)
CALCIUM SERPL-MCNC: 8.4 MG/DL (ref 8.7–10.5)
CHLORIDE SERPL-SCNC: 110 MMOL/L (ref 95–110)
CO2 SERPL-SCNC: 23 MMOL/L (ref 23–29)
CREAT SERPL-MCNC: 1.6 MG/DL (ref 0.5–1.4)
ERYTHROCYTE [DISTWIDTH] IN BLOOD BY AUTOMATED COUNT: 13.6 % (ref 11.5–14.5)
GFR SERPLBLD CREATININE-BSD FMLA CKD-EPI: 43 ML/MIN/1.73/M2
GLUCOSE SERPL-MCNC: 106 MG/DL (ref 70–110)
HCT VFR BLD AUTO: 32.4 % (ref 40–54)
HGB BLD-MCNC: 11.1 GM/DL (ref 14–18)
IMM GRANULOCYTES # BLD AUTO: 0.02 K/UL (ref 0–0.04)
IMM GRANULOCYTES NFR BLD AUTO: 0.4 % (ref 0–0.5)
LYMPHOCYTES # BLD AUTO: 1.47 K/UL (ref 1–4.8)
MCH RBC QN AUTO: 28.8 PG (ref 27–31)
MCHC RBC AUTO-ENTMCNC: 34.3 G/DL (ref 32–36)
MCV RBC AUTO: 84 FL (ref 82–98)
NUCLEATED RBC (/100WBC) (OHS): 0 /100 WBC
PLATELET # BLD AUTO: 196 K/UL (ref 150–450)
PMV BLD AUTO: 10.2 FL (ref 9.2–12.9)
POCT GLUCOSE: 114 MG/DL (ref 70–110)
POTASSIUM SERPL-SCNC: 4.1 MMOL/L (ref 3.5–5.1)
RBC # BLD AUTO: 3.85 M/UL (ref 4.6–6.2)
RELATIVE EOSINOPHIL (OHS): 6.3 %
RELATIVE LYMPHOCYTE (OHS): 32.9 % (ref 18–48)
RELATIVE MONOCYTE (OHS): 13.2 % (ref 4–15)
RELATIVE NEUTROPHIL (OHS): 46.5 % (ref 38–73)
SODIUM SERPL-SCNC: 142 MMOL/L (ref 136–145)
WBC # BLD AUTO: 4.47 K/UL (ref 3.9–12.7)

## 2025-08-25 PROCEDURE — 80048 BASIC METABOLIC PNL TOTAL CA: CPT | Mod: HCNC | Performed by: INTERNAL MEDICINE

## 2025-08-25 PROCEDURE — G0378 HOSPITAL OBSERVATION PER HR: HCPCS | Mod: HCNC

## 2025-08-25 PROCEDURE — 94761 N-INVAS EAR/PLS OXIMETRY MLT: CPT | Mod: HCNC

## 2025-08-25 PROCEDURE — 25000003 PHARM REV CODE 250: Mod: HCNC | Performed by: INTERNAL MEDICINE

## 2025-08-25 PROCEDURE — 85025 COMPLETE CBC W/AUTO DIFF WBC: CPT | Mod: HCNC | Performed by: INTERNAL MEDICINE

## 2025-08-25 PROCEDURE — 36415 COLL VENOUS BLD VENIPUNCTURE: CPT | Mod: HCNC | Performed by: INTERNAL MEDICINE

## 2025-08-25 PROCEDURE — 96361 HYDRATE IV INFUSION ADD-ON: CPT

## 2025-08-25 RX ORDER — LOSARTAN POTASSIUM 25 MG/1
25 TABLET ORAL DAILY
Qty: 30 TABLET | Refills: 0 | Status: SHIPPED | OUTPATIENT
Start: 2025-08-26 | End: 2025-09-25

## 2025-08-25 RX ORDER — AMLODIPINE BESYLATE 2.5 MG/1
2.5 TABLET ORAL DAILY
Qty: 30 TABLET | Refills: 0 | Status: SHIPPED | OUTPATIENT
Start: 2025-08-26 | End: 2025-09-25

## 2025-08-25 RX ADMIN — LOSARTAN POTASSIUM 25 MG: 25 TABLET, FILM COATED ORAL at 09:08

## 2025-08-25 RX ADMIN — LINACLOTIDE 72 MCG: 72 CAPSULE, GELATIN COATED ORAL at 05:08

## 2025-08-25 RX ADMIN — AMLODIPINE BESYLATE 2.5 MG: 2.5 TABLET ORAL at 09:08

## 2025-08-25 RX ADMIN — ASPIRIN 81 MG CHEWABLE TABLET 81 MG: 81 TABLET CHEWABLE at 09:08

## 2025-08-25 RX ADMIN — OXYBUTYNIN CHLORIDE 10 MG: 5 TABLET, EXTENDED RELEASE ORAL at 09:08

## 2025-08-25 RX ADMIN — ALLOPURINOL 100 MG: 100 TABLET ORAL at 09:08

## 2025-08-27 ENCOUNTER — OFFICE VISIT (OUTPATIENT)
Dept: INTERNAL MEDICINE | Facility: CLINIC | Age: 85
End: 2025-08-27
Payer: MEDICARE

## 2025-08-27 VITALS
HEART RATE: 90 BPM | OXYGEN SATURATION: 99 % | DIASTOLIC BLOOD PRESSURE: 54 MMHG | WEIGHT: 207 LBS | SYSTOLIC BLOOD PRESSURE: 100 MMHG | BODY MASS INDEX: 25.87 KG/M2 | TEMPERATURE: 97 F

## 2025-08-27 DIAGNOSIS — R06.09 DYSPNEA ON EXERTION: Primary | ICD-10-CM

## 2025-08-27 DIAGNOSIS — E11.51 TYPE 2 DIABETES MELLITUS WITH DIABETIC PERIPHERAL ANGIOPATHY WITHOUT GANGRENE, WITHOUT LONG-TERM CURRENT USE OF INSULIN: ICD-10-CM

## 2025-08-27 DIAGNOSIS — E11.69 COMBINED HYPERLIPIDEMIA ASSOCIATED WITH TYPE 2 DIABETES MELLITUS: Chronic | ICD-10-CM

## 2025-08-27 DIAGNOSIS — K59.01 SLOW TRANSIT CONSTIPATION: ICD-10-CM

## 2025-08-27 DIAGNOSIS — I15.2 HYPERTENSION ASSOCIATED WITH DIABETES: Chronic | ICD-10-CM

## 2025-08-27 DIAGNOSIS — E78.2 COMBINED HYPERLIPIDEMIA ASSOCIATED WITH TYPE 2 DIABETES MELLITUS: Chronic | ICD-10-CM

## 2025-08-27 DIAGNOSIS — E11.59 HYPERTENSION ASSOCIATED WITH DIABETES: Chronic | ICD-10-CM

## 2025-08-27 PROCEDURE — 3288F FALL RISK ASSESSMENT DOCD: CPT | Mod: CPTII,HCNC,S$GLB, | Performed by: INTERNAL MEDICINE

## 2025-08-27 PROCEDURE — 99214 OFFICE O/P EST MOD 30 MIN: CPT | Mod: HCNC,S$GLB,, | Performed by: INTERNAL MEDICINE

## 2025-08-27 PROCEDURE — G2211 COMPLEX E/M VISIT ADD ON: HCPCS | Mod: HCNC,S$GLB,, | Performed by: INTERNAL MEDICINE

## 2025-08-27 PROCEDURE — 99999 PR PBB SHADOW E&M-EST. PATIENT-LVL IV: CPT | Mod: PBBFAC,HCNC,, | Performed by: INTERNAL MEDICINE

## 2025-08-27 PROCEDURE — 3074F SYST BP LT 130 MM HG: CPT | Mod: CPTII,HCNC,S$GLB, | Performed by: INTERNAL MEDICINE

## 2025-08-27 PROCEDURE — 1159F MED LIST DOCD IN RCRD: CPT | Mod: CPTII,HCNC,S$GLB, | Performed by: INTERNAL MEDICINE

## 2025-08-27 PROCEDURE — 1160F RVW MEDS BY RX/DR IN RCRD: CPT | Mod: CPTII,HCNC,S$GLB, | Performed by: INTERNAL MEDICINE

## 2025-08-27 PROCEDURE — 3078F DIAST BP <80 MM HG: CPT | Mod: CPTII,HCNC,S$GLB, | Performed by: INTERNAL MEDICINE

## 2025-08-27 PROCEDURE — 1101F PT FALLS ASSESS-DOCD LE1/YR: CPT | Mod: CPTII,HCNC,S$GLB, | Performed by: INTERNAL MEDICINE

## 2025-09-03 ENCOUNTER — OUTPATIENT CASE MANAGEMENT (OUTPATIENT)
Dept: ADMINISTRATIVE | Facility: OTHER | Age: 85
End: 2025-09-03
Payer: MEDICARE

## 2025-09-04 RX ORDER — PRAVASTATIN SODIUM 40 MG/1
40 TABLET ORAL
Qty: 100 TABLET | Refills: 3 | Status: SHIPPED | OUTPATIENT
Start: 2025-09-04

## 2025-09-04 RX ORDER — ALLOPURINOL 100 MG/1
100 TABLET ORAL
Qty: 100 TABLET | Refills: 3 | Status: SHIPPED | OUTPATIENT
Start: 2025-09-04

## (undated) DEVICE — DRAPE STERI INSTRUMENT 1018

## (undated) DEVICE — DRAPE ANGIO BRACH 38X44IN

## (undated) DEVICE — DRAPE INCISE IOBAN 2 23X33IN

## (undated) DEVICE — SEE MEDLINE ITEM 157131

## (undated) DEVICE — DRAPE HIP TIBURON 87X115X134

## (undated) DEVICE — SYS CLSR DERMABOND PRINEO 22CM

## (undated) DEVICE — CORD BIPOLAR ELECTROSURGICAL

## (undated) DEVICE — OMNIPAQUE 300MG 150ML VIAL

## (undated) DEVICE — SPONGE LAP 18X18 PREWASHED

## (undated) DEVICE — SEE MEDLINE ITEM 152622

## (undated) DEVICE — KIT PT CARE HANA PROFX SSXT

## (undated) DEVICE — SEE MEDLINE ITEM 157117

## (undated) DEVICE — BLADE SYSTEM 6 25MMX90MMX1.27M

## (undated) DEVICE — GLOVE PROTEXIS HYDROGEL SZ7.5

## (undated) DEVICE — SCRUB 10% POVIDONE IODINE 4OZ

## (undated) DEVICE — TUBING HF INSUFFLATION W/ FLTR

## (undated) DEVICE — BLADE SURG CARBON STEEL #10

## (undated) DEVICE — SOL 9P NACL IRR PIC IL

## (undated) DEVICE — SEE MEDLINE ITEM 146308

## (undated) DEVICE — CATH IMPULSE PIGTAIL 6F 110CM

## (undated) DEVICE — Device

## (undated) DEVICE — DRAPE PLASTIC U 60X72

## (undated) DEVICE — ANGIOTOUCH KIT

## (undated) DEVICE — SUT VICRYL BR 1 GEN 27 CT-1

## (undated) DEVICE — SEE MEDLINE ITEM 157173

## (undated) DEVICE — GUIDEWIRE WHOLEY HI TORQ 175CM

## (undated) DEVICE — SEE MEDLINE ITEM 157148

## (undated) DEVICE — SEE MEDLINE ITEM 157027

## (undated) DEVICE — STAPLER SKIN PROXIMATE WIDE

## (undated) DEVICE — CONTAINER SPECIMEN STRL 4OZ

## (undated) DEVICE — NDL SPINAL 18GX3.5 SPINOCAN

## (undated) DEVICE — CLOSURE SKIN STERI STRIP 1/2X4

## (undated) DEVICE — SEE MEDLINE ITEM 146420

## (undated) DEVICE — TIP SUCTION YANKAUER

## (undated) DEVICE — SYR ONLY LUER LOCK 20CC

## (undated) DEVICE — ELECTRODE REM PLYHSV RETURN 9

## (undated) DEVICE — ADHESIVE MASTISOL VIAL 48/BX

## (undated) DEVICE — SHEET XLGE DRAPE

## (undated) DEVICE — TRAY MINOR GEN SURG

## (undated) DEVICE — SEALER AQUAMANTYS 3.48MM

## (undated) DEVICE — ALCOHOL 70% ISOP RUBBING 4OZ

## (undated) DEVICE — GLOVE LINER CUT RESISTANT LG

## (undated) DEVICE — SOL IRR NACL .9% 3000ML

## (undated) DEVICE — MANIFOLD 4 PORT

## (undated) DEVICE — TRAY CATH UM FOLEY SIL W 16FR

## (undated) DEVICE — SEE MEDLINE ITEM 157216

## (undated) DEVICE — CONTAINER 32OZ PATHOLOGY

## (undated) DEVICE — SEE MEDLINE ITEM 152739

## (undated) DEVICE — HOOD FLYTE PEELWY STERISHIELD

## (undated) DEVICE — BLADE SURG #15 CARBON STEEL

## (undated) DEVICE — SEE MEDLINE ITEM 157125

## (undated) DEVICE — TROCAR ENDOPATH XCEL 5X75MM

## (undated) DEVICE — NDL HYPO REG 25G X 1 1/2

## (undated) DEVICE — HOOD T-5 TEAR AWAY STERILE

## (undated) DEVICE — KIT SITE-RITE NDL GUIDE 21G

## (undated) DEVICE — GOWN SURGICAL X-LARGE

## (undated) DEVICE — SUT PROLENE 2-0 FSLX BL

## (undated) DEVICE — KIT IRR SUCTION HND PIECE

## (undated) DEVICE — PILLOW ABDUCTION MED

## (undated) DEVICE — KIT SYR REUSABLE

## (undated) DEVICE — GOWN SURG 2XL DISP TIE BACK

## (undated) DEVICE — ELECTRODE BLD EXT 6.50 ST DISP

## (undated) DEVICE — CATH DIAG IMPULSE 6FR FR4

## (undated) DEVICE — LINER GLOVE POWDERFREE 8

## (undated) DEVICE — DECANTER VIAL ASEPTIC TRANSFER

## (undated) DEVICE — NDL HYPODERMIC BLUNT 18G 1.5IN

## (undated) DEVICE — COVER OVERHEAD SURG LT BLUE

## (undated) DEVICE — SUT MCRYL PLUS 4-0 PS2 27IN

## (undated) DEVICE — STRAP SECURE 5MM

## (undated) DEVICE — SOL NS 1000CC

## (undated) DEVICE — SUT PROLENE 3-0 PS-1 BL 18

## (undated) DEVICE — SKIN MARKER DEVON 160

## (undated) DEVICE — SUT ETHILON 3-0 PS2 18 BLK

## (undated) DEVICE — SEALER AQUAMANTYS 2.3 BIPOLAR

## (undated) DEVICE — GAUZE SPONGE 4X4 12PLY

## (undated) DEVICE — DRAPE MOBILE C-ARM

## (undated) DEVICE — CATH DIAG IMPULSE 6FR FL4

## (undated) DEVICE — DRAPE STERI U-SHAPED 47X51IN

## (undated) DEVICE — DRAPE INCISE IOBAN 2 23X17IN

## (undated) DEVICE — BLADE SURG CARBON STEEL SZ11

## (undated) DEVICE — SUPPORT ULNA NERVE PROTECTOR

## (undated) DEVICE — SOL NACL 0.9% INJ 250ML BG

## (undated) DEVICE — CATH SWAN GANZ 7FR 110CM

## (undated) DEVICE — SEE MEDLINE ITEM 157166

## (undated) DEVICE — SUT PROLENE 3-0 PS-2 BL 18IN

## (undated) DEVICE — SEE MEDLINE ITEM 154981

## (undated) DEVICE — TOURNIQUET SB QC DP 18X4IN

## (undated) DEVICE — PACK HEART CATH BR

## (undated) DEVICE — GLOVE 8 PROTEXIS PI BLUE

## (undated) DEVICE — SUT VICRYL 1 OB 36 CTX

## (undated) DEVICE — TAPE SURGICAL MICROFOAM 3IN

## (undated) DEVICE — UNDERGLOVES BIOGEL PI SIZE 8.5

## (undated) DEVICE — SUT 0 VICRYL / UR6 (J603)

## (undated) DEVICE — TAPE SILK 3IN

## (undated) DEVICE — TROCAR ENDOPATH XCEL 5MM 7.5CM

## (undated) DEVICE — SEE MEDLINE ITEM 152529

## (undated) DEVICE — APPLICATOR CHLORAPREP ORN 26ML

## (undated) DEVICE — KIT MANIFOLD LOW PRESS TUBING

## (undated) DEVICE — PAD CAST SPECIALIST STRL 4

## (undated) DEVICE — DISSECTOR SPACEMAKER + 10-12MM

## (undated) DEVICE — GLOVE SURGICAL LATEX SZ 8

## (undated) DEVICE — SEE MEDLINE ITEM 146292